# Patient Record
Sex: FEMALE | Race: ASIAN | NOT HISPANIC OR LATINO | URBAN - METROPOLITAN AREA
[De-identification: names, ages, dates, MRNs, and addresses within clinical notes are randomized per-mention and may not be internally consistent; named-entity substitution may affect disease eponyms.]

---

## 2017-02-14 ENCOUNTER — EMERGENCY (EMERGENCY)
Facility: HOSPITAL | Age: 62
LOS: 1 days | Discharge: HOME | End: 2017-02-14
Admitting: EMERGENCY MEDICINE
Payer: MEDICAID

## 2017-02-14 VITALS
DIASTOLIC BLOOD PRESSURE: 84 MMHG | SYSTOLIC BLOOD PRESSURE: 144 MMHG | HEART RATE: 75 BPM | OXYGEN SATURATION: 98 % | RESPIRATION RATE: 18 BRPM | TEMPERATURE: 98 F

## 2017-02-14 PROCEDURE — 29125 APPL SHORT ARM SPLINT STATIC: CPT

## 2017-02-14 PROCEDURE — 99283 EMERGENCY DEPT VISIT LOW MDM: CPT | Mod: 25

## 2017-02-14 NOTE — ED ADULT TRIAGE NOTE - CHIEF COMPLAINT QUOTE
Pt s/p slip and fall on snow Friday with left hand injury. Left hand appears swollen in triage. Color and temperature normal. Pt able to move all digits and left radial pulse palpable.

## 2017-02-15 PROCEDURE — 73130 X-RAY EXAM OF HAND: CPT | Mod: 26,LT

## 2017-02-15 PROCEDURE — 73110 X-RAY EXAM OF WRIST: CPT | Mod: 26,LT

## 2017-02-15 NOTE — ED PROVIDER NOTE - PROGRESS NOTE DETAILS
The scribe's documentation has been prepared under my direction and personally reviewed by me in its entirety. I confirm that the note above accurately reflects all work, treatment, procedures, and medical decision making performed by me. Mohamud Gomez PA-C

## 2017-02-15 NOTE — ED PROVIDER NOTE - MEDICAL DECISION MAKING DETAILS
61 y/o female w/ left wrist pain s/p fall- xray, ice, ortho f/u 61 y/o female w/ left wrist pain s/p fall- xray, ice, splint, ortho f/u

## 2017-02-15 NOTE — ED PROVIDER NOTE - MUSCULOSKELETAL MINIMAL EXAM
positive bruising, ttp along radial surface, liimted ROM due to apin, distal sensation intact, 2+ pulses, cap refill less than 2 sec RANGE OF MOTION LIMITED/positive bruising, ttp along radial surface, liimted ROM due to apin, distal sensation intact, 2+ pulses, cap refill less than 2 sec/TENDERNESS/motor intact

## 2017-02-15 NOTE — ED PROVIDER NOTE - NS ED MD SCRIBE ATTENDING SCRIBE SECTIONS
PAST MEDICAL/SURGICAL/SOCIAL HISTORY/DISPOSITION/REVIEW OF SYSTEMS/HISTORY OF PRESENT ILLNESS/VITAL SIGNS( Pullset)/HIV/PHYSICAL EXAM

## 2017-02-15 NOTE — ED PROVIDER NOTE - OBJECTIVE STATEMENT
61 y/o F w/ no significant PMHx, presents to the ED c/o left wrist pain s/p slip and fall on ice 5 days ago. Pt notes she fell on her left arm. Denies LOC or any other complaints. 63 y/o F w/ no significant PMHx, presents to the ED c/o left wrist pain s/p slip and fall on ice 5 days ago. Pt notes she fell on her left arm. Denies LOC, head trauma or any other complaints.

## 2017-02-16 NOTE — ED PROCEDURE NOTE - NS ED PERI VASCULAR NEG
no cyanosis of extremity/capillary refill time < 2 seconds/no paresthesia/fingers/toes warm to touch

## 2017-02-16 NOTE — ED PROCEDURE NOTE - CPROC ED POST PROC CARE GUIDE1
Instructed patient/caregiver to follow-up with primary care physician./Keep the cast/splint/dressing clean and dry./Verbal/written post procedure instructions were given to patient/caregiver./Instructed patient/caregiver regarding signs and symptoms of infection./Elevate the injured extremity as instructed.

## 2017-02-16 NOTE — ED PROCEDURE NOTE - NS ED PERI NEURO NEG
The patient/caregiver verbalized understanding of how to care for the injured extremity with splint/Post-application: Motor, sensory, and vascular responses intact in the injured extremity./Pre-application: Motor, sensory, and vascular responses intact in the injured extremity.

## 2017-09-18 ENCOUNTER — INPATIENT (INPATIENT)
Facility: HOSPITAL | Age: 62
LOS: 10 days | Discharge: HOME | End: 2017-09-29
Attending: HOSPITALIST | Admitting: HOSPITALIST
Payer: MEDICAID

## 2017-09-18 VITALS
SYSTOLIC BLOOD PRESSURE: 121 MMHG | RESPIRATION RATE: 18 BRPM | DIASTOLIC BLOOD PRESSURE: 64 MMHG | HEART RATE: 79 BPM | OXYGEN SATURATION: 99 % | TEMPERATURE: 98 F

## 2017-09-18 DIAGNOSIS — R07.9 CHEST PAIN, UNSPECIFIED: ICD-10-CM

## 2017-09-18 LAB
ALBUMIN SERPL ELPH-MCNC: 4 G/DL — SIGNIFICANT CHANGE UP (ref 3.3–5)
ALP SERPL-CCNC: 102 U/L — SIGNIFICANT CHANGE UP (ref 40–120)
ALT FLD-CCNC: 33 U/L — SIGNIFICANT CHANGE UP (ref 4–33)
ANISOCYTOSIS BLD QL: SLIGHT — SIGNIFICANT CHANGE UP
APPEARANCE UR: CLEAR — SIGNIFICANT CHANGE UP
AST SERPL-CCNC: 31 U/L — SIGNIFICANT CHANGE UP (ref 4–32)
BASE EXCESS BLDV CALC-SCNC: 4.3 MMOL/L — SIGNIFICANT CHANGE UP
BASOPHILS # BLD AUTO: 0.07 K/UL — SIGNIFICANT CHANGE UP (ref 0–0.2)
BASOPHILS NFR BLD AUTO: 0.8 % — SIGNIFICANT CHANGE UP (ref 0–2)
BASOPHILS NFR SPEC: 1.8 % — SIGNIFICANT CHANGE UP (ref 0–2)
BILIRUB SERPL-MCNC: 0.2 MG/DL — SIGNIFICANT CHANGE UP (ref 0.2–1.2)
BILIRUB UR-MCNC: NEGATIVE — SIGNIFICANT CHANGE UP
BLOOD GAS VENOUS - CREATININE: 0.5 MG/DL — SIGNIFICANT CHANGE UP (ref 0.5–1.3)
BLOOD UR QL VISUAL: NEGATIVE — SIGNIFICANT CHANGE UP
BUN SERPL-MCNC: 14 MG/DL — SIGNIFICANT CHANGE UP (ref 7–23)
CALCIUM SERPL-MCNC: 9.6 MG/DL — SIGNIFICANT CHANGE UP (ref 8.4–10.5)
CHLORIDE BLDV-SCNC: 106 MMOL/L — SIGNIFICANT CHANGE UP (ref 96–108)
CHLORIDE SERPL-SCNC: 104 MMOL/L — SIGNIFICANT CHANGE UP (ref 98–107)
CK MB BLD-MCNC: 1.6 NG/ML — SIGNIFICANT CHANGE UP (ref 1–4.7)
CK MB BLD-MCNC: SIGNIFICANT CHANGE UP (ref 0–2.5)
CK SERPL-CCNC: 38 U/L — SIGNIFICANT CHANGE UP (ref 25–170)
CO2 SERPL-SCNC: 26 MMOL/L — SIGNIFICANT CHANGE UP (ref 22–31)
COLOR SPEC: SIGNIFICANT CHANGE UP
CREAT SERPL-MCNC: 0.61 MG/DL — SIGNIFICANT CHANGE UP (ref 0.5–1.3)
EOSINOPHIL # BLD AUTO: 0.28 K/UL — SIGNIFICANT CHANGE UP (ref 0–0.5)
EOSINOPHIL NFR BLD AUTO: 3 % — SIGNIFICANT CHANGE UP (ref 0–6)
EOSINOPHIL NFR FLD: 0.9 % — SIGNIFICANT CHANGE UP (ref 0–6)
GAS PNL BLDV: 136 MMOL/L — SIGNIFICANT CHANGE UP (ref 136–146)
GIANT PLATELETS BLD QL SMEAR: PRESENT — SIGNIFICANT CHANGE UP
GLUCOSE BLDV-MCNC: 203 — HIGH (ref 70–99)
GLUCOSE SERPL-MCNC: 200 MG/DL — HIGH (ref 70–99)
GLUCOSE UR-MCNC: >1000 — SIGNIFICANT CHANGE UP
HBA1C BLD-MCNC: 10.8 % — HIGH (ref 4–5.6)
HCO3 BLDV-SCNC: 27 MMOL/L — SIGNIFICANT CHANGE UP (ref 20–27)
HCT VFR BLD CALC: 38.5 % — SIGNIFICANT CHANGE UP (ref 34.5–45)
HCT VFR BLDV CALC: 37.7 % — SIGNIFICANT CHANGE UP (ref 34.5–45)
HGB BLD-MCNC: 12.5 G/DL — SIGNIFICANT CHANGE UP (ref 11.5–15.5)
HGB BLDV-MCNC: 12.2 G/DL — SIGNIFICANT CHANGE UP (ref 11.5–15.5)
HYPOCHROMIA BLD QL: SLIGHT — SIGNIFICANT CHANGE UP
IMM GRANULOCYTES # BLD AUTO: 0.13 # — SIGNIFICANT CHANGE UP
IMM GRANULOCYTES NFR BLD AUTO: 1.4 % — SIGNIFICANT CHANGE UP (ref 0–1.5)
INR BLD: 0.96 — SIGNIFICANT CHANGE UP (ref 0.88–1.17)
KETONES UR-MCNC: NEGATIVE — SIGNIFICANT CHANGE UP
LACTATE BLDV-MCNC: 1.7 MMOL/L — SIGNIFICANT CHANGE UP (ref 0.5–2)
LEUKOCYTE ESTERASE UR-ACNC: NEGATIVE — SIGNIFICANT CHANGE UP
LYMPHOCYTES # BLD AUTO: 3.89 K/UL — HIGH (ref 1–3.3)
LYMPHOCYTES # BLD AUTO: 42.3 % — SIGNIFICANT CHANGE UP (ref 13–44)
LYMPHOCYTES NFR SPEC AUTO: 29.7 % — SIGNIFICANT CHANGE UP (ref 13–44)
MCHC RBC-ENTMCNC: 27.2 PG — SIGNIFICANT CHANGE UP (ref 27–34)
MCHC RBC-ENTMCNC: 32.5 % — SIGNIFICANT CHANGE UP (ref 32–36)
MCV RBC AUTO: 83.7 FL — SIGNIFICANT CHANGE UP (ref 80–100)
MICROCYTES BLD QL: SLIGHT — SIGNIFICANT CHANGE UP
MONOCYTES # BLD AUTO: 0.64 K/UL — SIGNIFICANT CHANGE UP (ref 0–0.9)
MONOCYTES NFR BLD AUTO: 7 % — SIGNIFICANT CHANGE UP (ref 2–14)
MONOCYTES NFR BLD: 8.1 % — SIGNIFICANT CHANGE UP (ref 2–9)
MUCOUS THREADS # UR AUTO: SIGNIFICANT CHANGE UP
NEUTROPHIL AB SER-ACNC: 53.2 % — SIGNIFICANT CHANGE UP (ref 43–77)
NEUTROPHILS # BLD AUTO: 4.18 K/UL — SIGNIFICANT CHANGE UP (ref 1.8–7.4)
NEUTROPHILS NFR BLD AUTO: 45.5 % — SIGNIFICANT CHANGE UP (ref 43–77)
NITRITE UR-MCNC: NEGATIVE — SIGNIFICANT CHANGE UP
NRBC # FLD: 0 — SIGNIFICANT CHANGE UP
PCO2 BLDV: 51 MMHG — SIGNIFICANT CHANGE UP (ref 41–51)
PH BLDV: 7.37 PH — SIGNIFICANT CHANGE UP (ref 7.32–7.43)
PH UR: 7 — SIGNIFICANT CHANGE UP (ref 4.6–8)
PLATELET # BLD AUTO: 166 K/UL — SIGNIFICANT CHANGE UP (ref 150–400)
PLATELET COUNT - ESTIMATE: NORMAL — SIGNIFICANT CHANGE UP
PMV BLD: 11.6 FL — SIGNIFICANT CHANGE UP (ref 7–13)
PO2 BLDV: 32 MMHG — LOW (ref 35–40)
POTASSIUM BLDV-SCNC: 4.6 MMOL/L — HIGH (ref 3.4–4.5)
POTASSIUM SERPL-MCNC: 4.1 MMOL/L — SIGNIFICANT CHANGE UP (ref 3.5–5.3)
POTASSIUM SERPL-SCNC: 4.1 MMOL/L — SIGNIFICANT CHANGE UP (ref 3.5–5.3)
PROT SERPL-MCNC: 7.1 G/DL — SIGNIFICANT CHANGE UP (ref 6–8.3)
PROT UR-MCNC: NEGATIVE — SIGNIFICANT CHANGE UP
PROTHROM AB SERPL-ACNC: 10.8 SEC — SIGNIFICANT CHANGE UP (ref 9.8–13.1)
RBC # BLD: 4.6 M/UL — SIGNIFICANT CHANGE UP (ref 3.8–5.2)
RBC # FLD: 15 % — HIGH (ref 10.3–14.5)
RBC CASTS # UR COMP ASSIST: SIGNIFICANT CHANGE UP (ref 0–?)
REVIEW TO FOLLOW: YES — SIGNIFICANT CHANGE UP
SAO2 % BLDV: 56.3 % — LOW (ref 60–85)
SODIUM SERPL-SCNC: 142 MMOL/L — SIGNIFICANT CHANGE UP (ref 135–145)
SP GR SPEC: 1.01 — SIGNIFICANT CHANGE UP (ref 1–1.03)
TROPONIN T SERPL-MCNC: < 0.06 NG/ML — SIGNIFICANT CHANGE UP (ref 0–0.06)
UROBILINOGEN FLD QL: NORMAL E.U. — SIGNIFICANT CHANGE UP (ref 0.1–0.2)
VARIANT LYMPHS # BLD: 6.3 % — SIGNIFICANT CHANGE UP
WBC # BLD: 9.19 K/UL — SIGNIFICANT CHANGE UP (ref 3.8–10.5)
WBC # FLD AUTO: 9.19 K/UL — SIGNIFICANT CHANGE UP (ref 3.8–10.5)
WBC UR QL: SIGNIFICANT CHANGE UP (ref 0–?)

## 2017-09-18 PROCEDURE — 71020: CPT | Mod: 26

## 2017-09-18 RX ORDER — FAMOTIDINE 10 MG/ML
20 INJECTION INTRAVENOUS ONCE
Qty: 0 | Refills: 0 | Status: COMPLETED | OUTPATIENT
Start: 2017-09-18 | End: 2017-09-18

## 2017-09-18 RX ORDER — INSULIN LISPRO 100/ML
VIAL (ML) SUBCUTANEOUS
Qty: 0 | Refills: 0 | Status: DISCONTINUED | OUTPATIENT
Start: 2017-09-18 | End: 2017-09-19

## 2017-09-18 RX ORDER — DEXTROSE 50 % IN WATER 50 %
12.5 SYRINGE (ML) INTRAVENOUS ONCE
Qty: 0 | Refills: 0 | Status: DISCONTINUED | OUTPATIENT
Start: 2017-09-18 | End: 2017-09-29

## 2017-09-18 RX ORDER — HEPARIN SODIUM 5000 [USP'U]/ML
5000 INJECTION INTRAVENOUS; SUBCUTANEOUS EVERY 12 HOURS
Qty: 0 | Refills: 0 | Status: DISCONTINUED | OUTPATIENT
Start: 2017-09-18 | End: 2017-09-29

## 2017-09-18 RX ORDER — GLUCAGON INJECTION, SOLUTION 0.5 MG/.1ML
1 INJECTION, SOLUTION SUBCUTANEOUS ONCE
Qty: 0 | Refills: 0 | Status: DISCONTINUED | OUTPATIENT
Start: 2017-09-18 | End: 2017-09-29

## 2017-09-18 RX ORDER — ASPIRIN/CALCIUM CARB/MAGNESIUM 324 MG
162 TABLET ORAL DAILY
Qty: 0 | Refills: 0 | Status: DISCONTINUED | OUTPATIENT
Start: 2017-09-18 | End: 2017-09-29

## 2017-09-18 RX ORDER — SODIUM CHLORIDE 9 MG/ML
1000 INJECTION INTRAMUSCULAR; INTRAVENOUS; SUBCUTANEOUS ONCE
Qty: 0 | Refills: 0 | Status: COMPLETED | OUTPATIENT
Start: 2017-09-18 | End: 2017-09-18

## 2017-09-18 RX ORDER — SODIUM CHLORIDE 9 MG/ML
1000 INJECTION, SOLUTION INTRAVENOUS
Qty: 0 | Refills: 0 | Status: DISCONTINUED | OUTPATIENT
Start: 2017-09-18 | End: 2017-09-29

## 2017-09-18 RX ORDER — INSULIN LISPRO 100/ML
VIAL (ML) SUBCUTANEOUS AT BEDTIME
Qty: 0 | Refills: 0 | Status: DISCONTINUED | OUTPATIENT
Start: 2017-09-18 | End: 2017-09-29

## 2017-09-18 RX ORDER — ALBUTEROL 90 UG/1
2 AEROSOL, METERED ORAL EVERY 6 HOURS
Qty: 0 | Refills: 0 | Status: DISCONTINUED | OUTPATIENT
Start: 2017-09-18 | End: 2017-09-25

## 2017-09-18 RX ORDER — BUDESONIDE AND FORMOTEROL FUMARATE DIHYDRATE 160; 4.5 UG/1; UG/1
2 AEROSOL RESPIRATORY (INHALATION)
Qty: 0 | Refills: 0 | Status: DISCONTINUED | OUTPATIENT
Start: 2017-09-18 | End: 2017-09-19

## 2017-09-18 RX ORDER — IPRATROPIUM/ALBUTEROL SULFATE 18-103MCG
3 AEROSOL WITH ADAPTER (GRAM) INHALATION ONCE
Qty: 0 | Refills: 0 | Status: COMPLETED | OUTPATIENT
Start: 2017-09-18 | End: 2017-09-18

## 2017-09-18 RX ORDER — DEXTROSE 50 % IN WATER 50 %
25 SYRINGE (ML) INTRAVENOUS ONCE
Qty: 0 | Refills: 0 | Status: DISCONTINUED | OUTPATIENT
Start: 2017-09-18 | End: 2017-09-29

## 2017-09-18 RX ORDER — DEXTROSE 50 % IN WATER 50 %
1 SYRINGE (ML) INTRAVENOUS ONCE
Qty: 0 | Refills: 0 | Status: DISCONTINUED | OUTPATIENT
Start: 2017-09-18 | End: 2017-09-29

## 2017-09-18 RX ADMIN — Medication 40 MILLIGRAM(S): at 14:52

## 2017-09-18 RX ADMIN — Medication 30 MILLILITER(S): at 12:30

## 2017-09-18 RX ADMIN — Medication 162 MILLIGRAM(S): at 12:30

## 2017-09-18 RX ADMIN — Medication: at 23:34

## 2017-09-18 RX ADMIN — SODIUM CHLORIDE 1000 MILLILITER(S): 9 INJECTION INTRAMUSCULAR; INTRAVENOUS; SUBCUTANEOUS at 14:52

## 2017-09-18 RX ADMIN — FAMOTIDINE 20 MILLIGRAM(S): 10 INJECTION INTRAVENOUS at 12:30

## 2017-09-18 RX ADMIN — Medication 3 MILLILITER(S): at 13:14

## 2017-09-18 NOTE — ED ADULT TRIAGE NOTE - CHIEF COMPLAINT QUOTE
Pt co weakness, sob, chest pain, dizziness, back pain x 3 weeks. Daughter states FS was 38 this am, was given an apple and FS went up to 400.  in triage. Awake and alert in triage. Pt co weakness, sob, chest pain, dry cough, dizziness and back pain x 3 weeks. Daughter states FS was 38 this am, was given an apple and FS went up to 400.  in triage. Awake and alert in triage.

## 2017-09-18 NOTE — ED PROVIDER NOTE - OBJECTIVE STATEMENT
62F hx of DM on Metformin and bronchitis presents with chest pain, cough with yellow sputum, weight loss (8lb over a month) and dysuria x 1 month. CP is described as sharp and pressure like chest pain, intermittent, non exertional and not associated with nausea or vomiting or diaphoresis. Also endorsed to chronic bronchitis since bad PNA in 90s and having cough with yellow productive sputum. Also endorsed to burning sensation with urination and weight loss without trying. Pt denies fever or chills. No back pain or abd pain. No SOB.

## 2017-09-18 NOTE — ED PROVIDER NOTE - ATTENDING CONTRIBUTION TO CARE
AJM: Pt seen with resident an agree with above note. 62F hx of DM on Metformin and bronchitis presents with chest pain, cough with yellow sputum, weight loss (8lb over a month) and dysuria x 1 month. CP is described as sharp and pressure like chest pain, intermittent, non exertional and not associated with nausea or vomiting or diaphoresis. Also endorsed to chronic bronchitis. Chronic cough. Not on meds since moving from [place] 10 months ago. No LE pain ro swelling. symptoms not worse with exertion. Will r/o acs. symptoms likely 2/2 chronic pulm dz. steroids and nebs. cxr.

## 2017-09-19 DIAGNOSIS — R07.9 CHEST PAIN, UNSPECIFIED: ICD-10-CM

## 2017-09-19 DIAGNOSIS — J40 BRONCHITIS, NOT SPECIFIED AS ACUTE OR CHRONIC: ICD-10-CM

## 2017-09-19 DIAGNOSIS — E11.9 TYPE 2 DIABETES MELLITUS WITHOUT COMPLICATIONS: ICD-10-CM

## 2017-09-19 DIAGNOSIS — E11.65 TYPE 2 DIABETES MELLITUS WITH HYPERGLYCEMIA: ICD-10-CM

## 2017-09-19 DIAGNOSIS — Z29.9 ENCOUNTER FOR PROPHYLACTIC MEASURES, UNSPECIFIED: ICD-10-CM

## 2017-09-19 DIAGNOSIS — Z90.49 ACQUIRED ABSENCE OF OTHER SPECIFIED PARTS OF DIGESTIVE TRACT: Chronic | ICD-10-CM

## 2017-09-19 LAB
B PERT DNA SPEC QL NAA+PROBE: SIGNIFICANT CHANGE UP
BUN SERPL-MCNC: 24 MG/DL — HIGH (ref 7–23)
C PNEUM DNA SPEC QL NAA+PROBE: NOT DETECTED — SIGNIFICANT CHANGE UP
CALCIUM SERPL-MCNC: 9 MG/DL — SIGNIFICANT CHANGE UP (ref 8.4–10.5)
CHLORIDE SERPL-SCNC: 104 MMOL/L — SIGNIFICANT CHANGE UP (ref 98–107)
CHOLEST SERPL-MCNC: 130 MG/DL — SIGNIFICANT CHANGE UP (ref 120–199)
CK MB BLD-MCNC: 1.35 NG/ML — SIGNIFICANT CHANGE UP (ref 1–4.7)
CK SERPL-CCNC: 30 U/L — SIGNIFICANT CHANGE UP (ref 25–170)
CO2 SERPL-SCNC: 24 MMOL/L — SIGNIFICANT CHANGE UP (ref 22–31)
CREAT SERPL-MCNC: 0.72 MG/DL — SIGNIFICANT CHANGE UP (ref 0.5–1.3)
FLUAV H1 2009 PAND RNA SPEC QL NAA+PROBE: NOT DETECTED — SIGNIFICANT CHANGE UP
FLUAV H1 RNA SPEC QL NAA+PROBE: NOT DETECTED — SIGNIFICANT CHANGE UP
FLUAV H3 RNA SPEC QL NAA+PROBE: NOT DETECTED — SIGNIFICANT CHANGE UP
FLUAV SUBTYP SPEC NAA+PROBE: SIGNIFICANT CHANGE UP
FLUBV RNA SPEC QL NAA+PROBE: NOT DETECTED — SIGNIFICANT CHANGE UP
GLUCOSE SERPL-MCNC: 124 MG/DL — HIGH (ref 70–99)
HADV DNA SPEC QL NAA+PROBE: NOT DETECTED — SIGNIFICANT CHANGE UP
HCOV 229E RNA SPEC QL NAA+PROBE: NOT DETECTED — SIGNIFICANT CHANGE UP
HCOV HKU1 RNA SPEC QL NAA+PROBE: NOT DETECTED — SIGNIFICANT CHANGE UP
HCOV NL63 RNA SPEC QL NAA+PROBE: NOT DETECTED — SIGNIFICANT CHANGE UP
HCOV OC43 RNA SPEC QL NAA+PROBE: NOT DETECTED — SIGNIFICANT CHANGE UP
HCT VFR BLD CALC: 36.3 % — SIGNIFICANT CHANGE UP (ref 34.5–45)
HDLC SERPL-MCNC: 74 MG/DL — HIGH (ref 45–65)
HGB BLD-MCNC: 11.7 G/DL — SIGNIFICANT CHANGE UP (ref 11.5–15.5)
HMPV RNA SPEC QL NAA+PROBE: NOT DETECTED — SIGNIFICANT CHANGE UP
HPIV1 RNA SPEC QL NAA+PROBE: NOT DETECTED — SIGNIFICANT CHANGE UP
HPIV2 RNA SPEC QL NAA+PROBE: NOT DETECTED — SIGNIFICANT CHANGE UP
HPIV3 RNA SPEC QL NAA+PROBE: NOT DETECTED — SIGNIFICANT CHANGE UP
HPIV4 RNA SPEC QL NAA+PROBE: NOT DETECTED — SIGNIFICANT CHANGE UP
IGE SERPL-ACNC: 8.63 IU/ML — SIGNIFICANT CHANGE UP (ref 0–100)
LIPID PNL WITH DIRECT LDL SERPL: 51 MG/DL — SIGNIFICANT CHANGE UP
M PNEUMO DNA SPEC QL NAA+PROBE: NOT DETECTED — SIGNIFICANT CHANGE UP
MAGNESIUM SERPL-MCNC: 1.9 MG/DL — SIGNIFICANT CHANGE UP (ref 1.6–2.6)
MCHC RBC-ENTMCNC: 26.3 PG — LOW (ref 27–34)
MCHC RBC-ENTMCNC: 32.2 % — SIGNIFICANT CHANGE UP (ref 32–36)
MCV RBC AUTO: 81.6 FL — SIGNIFICANT CHANGE UP (ref 80–100)
NRBC # FLD: 0 — SIGNIFICANT CHANGE UP
PHOSPHATE SERPL-MCNC: 2.5 MG/DL — SIGNIFICANT CHANGE UP (ref 2.5–4.5)
PLATELET # BLD AUTO: 157 K/UL — SIGNIFICANT CHANGE UP (ref 150–400)
PMV BLD: 11.5 FL — SIGNIFICANT CHANGE UP (ref 7–13)
POTASSIUM SERPL-MCNC: 3.8 MMOL/L — SIGNIFICANT CHANGE UP (ref 3.5–5.3)
POTASSIUM SERPL-SCNC: 3.8 MMOL/L — SIGNIFICANT CHANGE UP (ref 3.5–5.3)
RBC # BLD: 4.45 M/UL — SIGNIFICANT CHANGE UP (ref 3.8–5.2)
RBC # FLD: 15 % — HIGH (ref 10.3–14.5)
RHEUMATOID FACT SERPL-ACNC: 12.1 IU/ML — SIGNIFICANT CHANGE UP
RSV RNA SPEC QL NAA+PROBE: POSITIVE — HIGH
RV+EV RNA SPEC QL NAA+PROBE: NOT DETECTED — SIGNIFICANT CHANGE UP
SODIUM SERPL-SCNC: 139 MMOL/L — SIGNIFICANT CHANGE UP (ref 135–145)
TRIGL SERPL-MCNC: 59 MG/DL — SIGNIFICANT CHANGE UP (ref 10–149)
TROPONIN T SERPL-MCNC: < 0.06 NG/ML — SIGNIFICANT CHANGE UP (ref 0–0.06)
TSH SERPL-MCNC: 1.93 UIU/ML — SIGNIFICANT CHANGE UP (ref 0.27–4.2)
WBC # BLD: 10.91 K/UL — HIGH (ref 3.8–10.5)
WBC # FLD AUTO: 10.91 K/UL — HIGH (ref 3.8–10.5)

## 2017-09-19 PROCEDURE — 99223 1ST HOSP IP/OBS HIGH 75: CPT | Mod: GC

## 2017-09-19 PROCEDURE — 93018 CV STRESS TEST I&R ONLY: CPT | Mod: GC

## 2017-09-19 PROCEDURE — 99255 IP/OBS CONSLTJ NEW/EST HI 80: CPT | Mod: GC

## 2017-09-19 PROCEDURE — 93016 CV STRESS TEST SUPVJ ONLY: CPT | Mod: GC

## 2017-09-19 PROCEDURE — 71250 CT THORAX DX C-: CPT | Mod: 26

## 2017-09-19 PROCEDURE — 78452 HT MUSCLE IMAGE SPECT MULT: CPT | Mod: 26

## 2017-09-19 RX ORDER — MONTELUKAST 4 MG/1
10 TABLET, CHEWABLE ORAL AT BEDTIME
Qty: 0 | Refills: 0 | Status: DISCONTINUED | OUTPATIENT
Start: 2017-09-19 | End: 2017-09-29

## 2017-09-19 RX ORDER — AZITHROMYCIN 500 MG/1
250 TABLET, FILM COATED ORAL DAILY
Qty: 0 | Refills: 0 | Status: COMPLETED | OUTPATIENT
Start: 2017-09-20 | End: 2017-09-23

## 2017-09-19 RX ORDER — CEFTRIAXONE 500 MG/1
1 INJECTION, POWDER, FOR SOLUTION INTRAMUSCULAR; INTRAVENOUS ONCE
Qty: 0 | Refills: 0 | Status: COMPLETED | OUTPATIENT
Start: 2017-09-19 | End: 2017-09-19

## 2017-09-19 RX ORDER — IPRATROPIUM/ALBUTEROL SULFATE 18-103MCG
3 AEROSOL WITH ADAPTER (GRAM) INHALATION EVERY 6 HOURS
Qty: 0 | Refills: 0 | Status: DISCONTINUED | OUTPATIENT
Start: 2017-09-19 | End: 2017-09-22

## 2017-09-19 RX ORDER — INSULIN LISPRO 100/ML
3 VIAL (ML) SUBCUTANEOUS
Qty: 0 | Refills: 0 | Status: DISCONTINUED | OUTPATIENT
Start: 2017-09-19 | End: 2017-09-20

## 2017-09-19 RX ORDER — DEXTROSE 50 % IN WATER 50 %
25 SYRINGE (ML) INTRAVENOUS ONCE
Qty: 0 | Refills: 0 | Status: DISCONTINUED | OUTPATIENT
Start: 2017-09-19 | End: 2017-09-29

## 2017-09-19 RX ORDER — AZITHROMYCIN 500 MG/1
500 TABLET, FILM COATED ORAL ONCE
Qty: 0 | Refills: 0 | Status: COMPLETED | OUTPATIENT
Start: 2017-09-19 | End: 2017-09-19

## 2017-09-19 RX ORDER — DEXTROSE 50 % IN WATER 50 %
1 SYRINGE (ML) INTRAVENOUS ONCE
Qty: 0 | Refills: 0 | Status: DISCONTINUED | OUTPATIENT
Start: 2017-09-19 | End: 2017-09-29

## 2017-09-19 RX ORDER — BUDESONIDE AND FORMOTEROL FUMARATE DIHYDRATE 160; 4.5 UG/1; UG/1
2 AEROSOL RESPIRATORY (INHALATION)
Qty: 0 | Refills: 0 | Status: DISCONTINUED | OUTPATIENT
Start: 2017-09-19 | End: 2017-09-29

## 2017-09-19 RX ORDER — CEFTRIAXONE 500 MG/1
1 INJECTION, POWDER, FOR SOLUTION INTRAMUSCULAR; INTRAVENOUS EVERY 24 HOURS
Qty: 0 | Refills: 0 | Status: DISCONTINUED | OUTPATIENT
Start: 2017-09-20 | End: 2017-09-24

## 2017-09-19 RX ORDER — INSULIN LISPRO 100/ML
VIAL (ML) SUBCUTANEOUS
Qty: 0 | Refills: 0 | Status: DISCONTINUED | OUTPATIENT
Start: 2017-09-19 | End: 2017-09-21

## 2017-09-19 RX ORDER — SODIUM CHLORIDE 9 MG/ML
1000 INJECTION, SOLUTION INTRAVENOUS
Qty: 0 | Refills: 0 | Status: DISCONTINUED | OUTPATIENT
Start: 2017-09-19 | End: 2017-09-29

## 2017-09-19 RX ORDER — CEFTRIAXONE 500 MG/1
INJECTION, POWDER, FOR SOLUTION INTRAMUSCULAR; INTRAVENOUS
Qty: 0 | Refills: 0 | Status: DISCONTINUED | OUTPATIENT
Start: 2017-09-19 | End: 2017-09-24

## 2017-09-19 RX ORDER — INSULIN GLARGINE 100 [IU]/ML
9 INJECTION, SOLUTION SUBCUTANEOUS AT BEDTIME
Qty: 0 | Refills: 0 | Status: DISCONTINUED | OUTPATIENT
Start: 2017-09-19 | End: 2017-09-21

## 2017-09-19 RX ORDER — GLUCAGON INJECTION, SOLUTION 0.5 MG/.1ML
1 INJECTION, SOLUTION SUBCUTANEOUS ONCE
Qty: 0 | Refills: 0 | Status: DISCONTINUED | OUTPATIENT
Start: 2017-09-19 | End: 2017-09-29

## 2017-09-19 RX ADMIN — Medication 3: at 17:24

## 2017-09-19 RX ADMIN — MONTELUKAST 10 MILLIGRAM(S): 4 TABLET, CHEWABLE ORAL at 22:04

## 2017-09-19 RX ADMIN — Medication 162 MILLIGRAM(S): at 11:48

## 2017-09-19 RX ADMIN — Medication 60 MILLIGRAM(S): at 13:33

## 2017-09-19 RX ADMIN — HEPARIN SODIUM 5000 UNIT(S): 5000 INJECTION INTRAVENOUS; SUBCUTANEOUS at 06:15

## 2017-09-19 RX ADMIN — CEFTRIAXONE 100 GRAM(S): 500 INJECTION, POWDER, FOR SOLUTION INTRAMUSCULAR; INTRAVENOUS at 17:25

## 2017-09-19 RX ADMIN — Medication 40 MILLIGRAM(S): at 22:05

## 2017-09-19 RX ADMIN — AZITHROMYCIN 500 MILLIGRAM(S): 500 TABLET, FILM COATED ORAL at 18:42

## 2017-09-19 RX ADMIN — BUDESONIDE AND FORMOTEROL FUMARATE DIHYDRATE 2 PUFF(S): 160; 4.5 AEROSOL RESPIRATORY (INHALATION) at 22:05

## 2017-09-19 RX ADMIN — Medication 6: at 22:04

## 2017-09-19 RX ADMIN — Medication 3 UNIT(S): at 17:25

## 2017-09-19 RX ADMIN — BUDESONIDE AND FORMOTEROL FUMARATE DIHYDRATE 2 PUFF(S): 160; 4.5 AEROSOL RESPIRATORY (INHALATION) at 09:38

## 2017-09-19 RX ADMIN — INSULIN GLARGINE 9 UNIT(S): 100 INJECTION, SOLUTION SUBCUTANEOUS at 22:05

## 2017-09-19 RX ADMIN — HEPARIN SODIUM 5000 UNIT(S): 5000 INJECTION INTRAVENOUS; SUBCUTANEOUS at 17:26

## 2017-09-19 RX ADMIN — Medication 4: at 12:40

## 2017-09-19 NOTE — H&P ADULT - ASSESSMENT
61 y/o F with h/o DM, bronchitis presents to the ED for sugar management. Admit to telemetry to r/o ACS.

## 2017-09-19 NOTE — H&P ADULT - HISTORY OF PRESENT ILLNESS
61 y/o F with h/o DM, bronchitis presents to the ED for sugar management. As per daughter, patient's sugars has been fluctuating from 60s to 400s upon checking fingersticks. As per daughter, patient hasn't been able to follow up with a doctor because doctor appointment is a month away.  Pt also states she has chest pressure whenever she coughs.  Pt denies any chest pain. Pt has been having a cough with yellow productive sputum. Pt denies Loc, syncope fever, chills, numbness, tingling, palpitations, N/V/D/C, dysuria, urinary/bowel incontinence or any other complaints at this time.

## 2017-09-19 NOTE — CONSULT NOTE ADULT - ATTENDING COMMENTS
This is a 62-year-old [place] female with a history of Asthma, Bronchiectasis, allergic bronchopulmonary aspergillosis (s/p Itraconazole 2015), on chronic steroids, and diabetes who presents with worsening cough, chest tightness, and pleuritic chest pain with wheezing consistent with asthma/bronchiectasis exacerbation.    - Given her diabetes and relatively stable respiratory status currently, would decrease solumedrol to 40 mg IV q12h  - Start Symbicort 160/4.5 2 puffs bid, give albuterol nebs q6h with prn albuterol q4h, monitor for tachycardia, start singulair 10 mg qhs  - Check sputum culture, RVP, Urine legionella, HIV, Quantiferon gold, immunoglobulin panel (including IgG, IgE levels), RF/CCP, aspergillus ab and aspergillus precipitins  - CT Chest non-contrast  - Would start Ceftriaxone/Azithromycin for now until cultures return, no history of pseudomonas or MRSA, non-toxic appearing at present, hold off on broad-spectrum abx at this time

## 2017-09-19 NOTE — H&P ADULT - NSHPREVIEWOFSYSTEMS_GEN_ALL_CORE
Constitutional: No fever, fatigue or weight loss.  Skin: No rash.  Eyes: No recent vision problems or eye pain.  ENT: No congestion, ear pain, or sore throat.  Endocrine: No thyroid problems.  Cardiovascular: No chest pain or palpitation.  Respiratory: + productive cough. No shortness of breath, congestion, or wheezing.  Gastrointestinal: No abdominal pain, nausea, vomiting, or diarrhea.  Genitourinary: No dysuria.  Musculoskeletal: No joint swelling.  Neurologic: No headache.

## 2017-09-19 NOTE — H&P ADULT - ATTENDING COMMENTS
Assessment  1.	Chest discomfort  2.	Dyspnea  3.	Bronchiectasis  4.	Uncontrolled diabetes mellitus      Plan  1.	Stress myocardial perfusion imaging  2.	Endocrine consult  3.	Pulmonary consult  4.	IV steroids and bronchodilators

## 2017-09-19 NOTE — H&P ADULT - NSHPLABSRESULTS_GEN_ALL_CORE
LABS:                        12.5   9.19  )-----------( 166      ( 18 Sep 2017 11:50 )             38.5         142  |  104  |  14  ----------------------------<  200<H>  4.1   |  26  |  0.61    Ca    9.6      18 Sep 2017 11:50    TPro  7.1  /  Alb  4.0  /  TBili  0.2  /  DBili  x   /  AST  31  /  ALT  33  /  AlkPhos  102  -    PT/INR - ( 18 Sep 2017 11:50 )   PT: 10.8 SEC;   INR: 0.96            Urinalysis Basic - ( 18 Sep 2017 19:25 )    Color: x / Appearance: CLEAR / S.009 / pH: 7.0  Gluc: >1000 / Ketone: NEGATIVE  / Bili: NEGATIVE / Urobili: NORMAL E.U.   Blood: NEGATIVE / Protein: NEGATIVE / Nitrite: NEGATIVE   Leuk Esterase: NEGATIVE / RBC: 0-2 / WBC 2-5   Sq Epi: x / Non Sq Epi: x / Bacteria: x      CAPILLARY BLOOD GLUCOSE  200 (19 Sep 2017 01:49)  330 (18 Sep 2017 22:26)  238 (18 Sep 2017 11:20)    Urinalysis Basic - ( 18 Sep 2017 19:25 )    Color: x / Appearance: CLEAR / S.009 / pH: 7.0  Gluc: >1000 / Ketone: NEGATIVE  / Bili: NEGATIVE / Urobili: NORMAL E.U.   Blood: NEGATIVE / Protein: NEGATIVE / Nitrite: NEGATIVE   Leuk Esterase: NEGATIVE / RBC: 0-2 / WBC 2-5   Sq Epi: x / Non Sq Epi: x / Bacteria: x

## 2017-09-19 NOTE — CONSULT NOTE ADULT - SUBJECTIVE AND OBJECTIVE BOX
CHIEF COMPLAINT:    HPI:63 y/o F with h/o DM, bronchitis presents to the ED for diabetes managment.     PAST MEDICAL & SURGICAL HISTORY:  Bronchitis  DM (diabetes mellitus)  History of cholecystectomy      FAMILY HISTORY:  No pertinent family history in first degree relatives      SOCIAL HISTORY:  Smoking: [ ] Never Smoked [ ] Former Smoker (__ packs x ___ years) [ ] Current Smoker  (__ packs x ___ years)  Substance Use: [ ] Never Used [ ] Used ____  EtOH Use:  Marital Status: [ ] Single [ ]  [ ]  [ ]   Sexual History:   Occupation:  Recent Travel:  Country of Birth:  Advance Directives:    Allergies    No Known Allergies    Intolerances        HOME MEDICATIONS:    REVIEW OF SYSTEMS:  Constitutional: [ ] negative [ ] fevers [ ] chills [ ] weight loss [ ] weight gain  HEENT: [ ] negative [ ] dry eyes [ ] eye irritation [ ] postnasal drip [ ] nasal congestion  CV: [ ] negative  [ ] chest pain [ ] orthopnea [ ] palpitations [ ] murmur  Resp: [ ] negative [ ] cough [ ] shortness of breath [ ] dyspnea [ ] wheezing [ ] sputum [ ] hemoptysis  GI: [ ] negative [ ] nausea [ ] vomiting [ ] diarrhea [ ] constipation [ ] abd pain [ ] dysphagia   : [ ] negative [ ] dysuria [ ] nocturia [ ] hematuria [ ] increased urinary frequency  Musculoskeletal: [ ] negative [ ] back pain [ ] myalgias [ ] arthralgias [ ] fracture  Skin: [ ] negative [ ] rash [ ] itch  Neurological: [ ] negative [ ] headache [ ] dizziness [ ] syncope [ ] weakness [ ] numbness  Psychiatric: [ ] negative [ ] anxiety [ ] depression  Endocrine: [ ] negative [ ] diabetes [ ] thyroid problem  Hematologic/Lymphatic: [ ] negative [ ] anemia [ ] bleeding problem  Allergic/Immunologic: [ ] negative [ ] itchy eyes [ ] nasal discharge [ ] hives [ ] angioedema  [ ] All other systems negative  [ ] Unable to assess ROS because ________    OBJECTIVE:  ICU Vital Signs Last 24 Hrs  T(C): 36.7 (19 Sep 2017 01:50), Max: 36.8 (18 Sep 2017 11:20)  T(F): 98 (19 Sep 2017 01:50), Max: 98.3 (18 Sep 2017 11:20)  HR: 66 (19 Sep 2017 01:50) (64 - 84)  BP: 130/75 (19 Sep 2017 01:50) (121/64 - 157/91)  BP(mean): --  ABP: --  ABP(mean): --  RR: 16 (19 Sep 2017 01:50) (16 - 24)  SpO2: 98% (19 Sep 2017 01:50) (97% - 99%)        CAPILLARY BLOOD GLUCOSE  91 (19 Sep 2017 08:51)          PHYSICAL EXAM:  General:   HEENT:   Lymph Nodes:  Neck:   Respiratory:   Cardiovascular:   Abdomen:   Extremities:   Skin:   Neurological:  Psychiatry:    HOSPITAL MEDICATIONS:  Standing Meds:  aspirin  chewable 162 milliGRAM(s) Oral daily  heparin  Injectable 5000 Unit(s) SubCutaneous every 12 hours  insulin lispro (HumaLOG) corrective regimen sliding scale   SubCutaneous three times a day before meals  insulin lispro (HumaLOG) corrective regimen sliding scale   SubCutaneous at bedtime  dextrose 5%. 1000 milliLiter(s) IV Continuous <Continuous>  dextrose 50% Injectable 12.5 Gram(s) IV Push once  dextrose 50% Injectable 25 Gram(s) IV Push once  dextrose 50% Injectable 25 Gram(s) IV Push once  buDESOnide  80 MICROgram(s)/formoterol 4.5 MICROgram(s) Inhaler 2 Puff(s) Inhalation two times a day  insulin lispro (HumaLOG) corrective regimen sliding scale   SubCutaneous three times a day before meals  dextrose 5%. 1000 milliLiter(s) IV Continuous <Continuous>  dextrose 50% Injectable 25 Gram(s) IV Push once      PRN Meds:  dextrose Gel 1 Dose(s) Oral once PRN  glucagon  Injectable 1 milliGRAM(s) IntraMuscular once PRN  ALBUTerol    90 MICROgram(s) HFA Inhaler 2 Puff(s) Inhalation every 6 hours PRN  dextrose Gel 1 Dose(s) Oral once PRN  glucagon  Injectable 1 milliGRAM(s) IntraMuscular once PRN      LABS:                        11.7   10.91 )-----------( 157      ( 19 Sep 2017 06:00 )             36.3     Hgb Trend: 11.7<--, 12.5<--      139  |  104  |  24<H>  ----------------------------<  124<H>  3.8   |  24  |  0.72    Ca    9.0      19 Sep 2017 06:00  Phos  2.5       Mg     1.9         TPro  7.1  /  Alb  4.0  /  TBili  0.2  /  DBili  x   /  AST  31  /  ALT  33  /  AlkPhos  102      Creatinine Trend: 0.72<--, 0.61<--  PT/INR - ( 18 Sep 2017 11:50 )   PT: 10.8 SEC;   INR: 0.96            Urinalysis Basic - ( 18 Sep 2017 19:25 )    Color: x / Appearance: CLEAR / S.009 / pH: 7.0  Gluc: >1000 / Ketone: NEGATIVE  / Bili: NEGATIVE / Urobili: NORMAL E.U.   Blood: NEGATIVE / Protein: NEGATIVE / Nitrite: NEGATIVE   Leuk Esterase: NEGATIVE / RBC: 0-2 / WBC 2-5   Sq Epi: x / Non Sq Epi: x / Bacteria: x        Venous Blood Gas:   @ 11:50  7.37/51/32/27/56.3  VBG Lactate: 1.7      MICROBIOLOGY:     RADIOLOGY:  [ ] Reviewed and interpreted by me    PULMONARY FUNCTION TESTS:    EKG: CHIEF COMPLAINT:    HPI:61 y/o F with h/o DM, bronchitis presents to the ED for worsening SOB, cough and sputum production. Pt has a sharyn history of bronchiectais diagnosed with ABPA in [place].  In .    She has had many exacerbationand many courses of prednisone which are self administered.  She ran out of her meds from [place] about 1 month ago and has used her daughters prednisone, albuterol, and Symbicort withno relief. She did run out of her augmentin from [place]. She has had negative quantaferon gold before, negative HIV, and positive aspergilous titers.  She has not had fevers or chills.  She has had increase in sputum production and change in sputum consistency      PAST MEDICAL & SURGICAL HISTORY:  Bronchitis  DM (diabetes mellitus)  History of cholecystectomy      FAMILY HISTORY:  No pertinent family history in first degree relatives      SOCIAL HISTORY:  Smoking: [ x] Never Smoked [ ] Former Smoker (__ packs x ___ years) [ ] Current Smoker  (__ packs x ___ years)  Substance Use: [ x] Never Used [ ] Used ____  EtOH Use:no  Marital Status: [ ] Single [ ]  [ ]  [ ]   Sexual History:   Occupation:  Recent Travel:  Country of Birth:  Advance Directives:    Allergies    No Known Allergies    Intolerances        HOME MEDICATIONS: as in Hpi    REVIEW OF SYSTEMS:  [x] All other systems negative  [ ] Unable to assess ROS because ________    OBJECTIVE:  ICU Vital Signs Last 24 Hrs  T(C): 36.7 (19 Sep 2017 01:50), Max: 36.8 (18 Sep 2017 11:20)  T(F): 98 (19 Sep 2017 01:50), Max: 98.3 (18 Sep 2017 11:20)  HR: 66 (19 Sep 2017 01:50) (64 - 84)  BP: 130/75 (19 Sep 2017 01:50) (121/64 - 157/91)  BP(mean): --  ABP: --  ABP(mean): --  RR: 16 (19 Sep 2017 01:50) (16 - 24)  SpO2: 98% (19 Sep 2017 01:50) (97% - 99%)        CAPILLARY BLOOD GLUCOSE  91 (19 Sep 2017 08:51)          PHYSICAL EXAM:  GENERAL: NAD  EYES: No proptosis, no lid lag, anicteric  HEENT:  Atraumatic, Normocephalic, moist mucous membranes  RESPIRATORY: Poor air entry and b/l upper lobe epiratory ronchi. Clear to auscultation bilaterally; No rales, rhonchi, wheezing, or rubs  CARDIOVASCULAR: Regular rate and rhythm; No murmurs; no peripheral edema  GI: Soft, nontender, non distended, normal bowel sounds  SKIN: Dry, intact, No rashes or lesions  NEURO: sensation intact  PSYCH: Alert and oriented x 3, normal affect, normal mood    HOSPITAL MEDICATIONS:  Standing Meds:  aspirin  chewable 162 milliGRAM(s) Oral daily  heparin  Injectable 5000 Unit(s) SubCutaneous every 12 hours  insulin lispro (HumaLOG) corrective regimen sliding scale   SubCutaneous three times a day before meals  insulin lispro (HumaLOG) corrective regimen sliding scale   SubCutaneous at bedtime  dextrose 5%. 1000 milliLiter(s) IV Continuous <Continuous>  dextrose 50% Injectable 12.5 Gram(s) IV Push once  dextrose 50% Injectable 25 Gram(s) IV Push once  dextrose 50% Injectable 25 Gram(s) IV Push once  buDESOnide  80 MICROgram(s)/formoterol 4.5 MICROgram(s) Inhaler 2 Puff(s) Inhalation two times a day  insulin lispro (HumaLOG) corrective regimen sliding scale   SubCutaneous three times a day before meals  dextrose 5%. 1000 milliLiter(s) IV Continuous <Continuous>  dextrose 50% Injectable 25 Gram(s) IV Push once      PRN Meds:  dextrose Gel 1 Dose(s) Oral once PRN  glucagon  Injectable 1 milliGRAM(s) IntraMuscular once PRN  ALBUTerol    90 MICROgram(s) HFA Inhaler 2 Puff(s) Inhalation every 6 hours PRN  dextrose Gel 1 Dose(s) Oral once PRN  glucagon  Injectable 1 milliGRAM(s) IntraMuscular once PRN      LABS:                        11.7   10.91 )-----------( 157      ( 19 Sep 2017 06:00 )             36.3     Hgb Trend: 11.7<--, 12.5<--      139  |  104  |  24<H>  ----------------------------<  124<H>  3.8   |  24  |  0.72    Ca    9.0      19 Sep 2017 06:00  Phos  2.5       Mg     1.9         TPro  7.1  /  Alb  4.0  /  TBili  0.2  /  DBili  x   /  AST  31  /  ALT  33  /  AlkPhos  102      Creatinine Trend: 0.72<--, 0.61<--  PT/INR - ( 18 Sep 2017 11:50 )   PT: 10.8 SEC;   INR: 0.96            Urinalysis Basic - ( 18 Sep 2017 19:25 )    Color: x / Appearance: CLEAR / S.009 / pH: 7.0  Gluc: >1000 / Ketone: NEGATIVE  / Bili: NEGATIVE / Urobili: NORMAL E.U.   Blood: NEGATIVE / Protein: NEGATIVE / Nitrite: NEGATIVE   Leuk Esterase: NEGATIVE / RBC: 0-2 / WBC 2-5   Sq Epi: x / Non Sq Epi: x / Bacteria: x        Venous Blood Gas:   @ 11:50  7.37/51/32/27/56.3  VBG Lactate: 1.7      MICROBIOLOGY:     RADIOLOGY:  [x] Reviewed and interpreted by me    PULMONARY FUNCTION TESTS:    EKG:

## 2017-09-19 NOTE — H&P ADULT - PROBLEM SELECTOR PLAN 1
Admit to telemetry. Pt denies having chest pain.   Trend CE. NST ordered.   check cbc,tsh,lipid, hemoglobin a1c, bmp with mag and phos.  d/w Dr. Mireles   f/u MD note

## 2017-09-19 NOTE — CONSULT NOTE ADULT - ASSESSMENT
This is a 62-year-old woman with a history of Bronchiectasis, allergic bronchopulmonary aspergillosis (s/p Itraconazole 2015), frrequent steroid and abx use, though no history of chronic steroids, and diabetes who presents with worsening cough, chest tightness, and pleuritic chest pain with wheezing consistent with asthma/bronchiectasis exacerbation.  - Check sputum culture, RVP, Urine legionella, HIV, Quantiferon gold, immunoglobulin panel (including IgG, IgE levels), RF/CCP, aspergillus ab and aspergillus precipitins  - solemdorol 40iv q12, duoneb q6, singulair qhs  - Would start Ceftriaxone/Azithromycin pending cultures.   - follow up chest ct.

## 2017-09-19 NOTE — H&P ADULT - PROBLEM SELECTOR PLAN 2
Patient has uncontrolled diabetes.   Will need endocrine consult in AM.   stop po medications and start sliding scale.   Low carb diet.

## 2017-09-19 NOTE — CONSULT NOTE ADULT - ATTENDING COMMENTS
62F uncontrolled DM2 exacerbated by steroids. While inpatient basal bolus. For dc recommend basal insulin plus metformin. DM education prior to dc. [facility]

## 2017-09-19 NOTE — CONSULT NOTE ADULT - SUBJECTIVE AND OBJECTIVE BOX
HPI:  61 y/o F with T2DM admitted for exacerbation of asthma/bronchiectasis. Patient was diagnosed with T2DM in 1998 while in [place]. She has been on oral medications only. She was taking a combination pill of metformin 500/pioglitazone 15/glimiperide 2. Patient has neuropathy. No known nephropathy or retinopathy. No history of CAD, CVA or PVD. Patient is on chronic steroids for asthma/bronchiectasis. She has been hospitalized multiple times for uncontrolled DM while in [place]. She checks her fingersticks in the AM , prelunch and predinner 200-400s. Has rare episodes of hypoglycemia. Admits to increased thirst and urination.        PAST MEDICAL & SURGICAL HISTORY:  Bronchitis  DM (diabetes mellitus)  History of cholecystectomy      FAMILY HISTORY:  Type 2 DM: mother and father      Social History: nonsmoker, nondrinker    Outpatient Medications: metformin/pioglitazone/glimepiride    MEDICATIONS  (STANDING):  aspirin  chewable 162 milliGRAM(s) Oral daily  heparin  Injectable 5000 Unit(s) SubCutaneous every 12 hours  insulin lispro (HumaLOG) corrective regimen sliding scale   SubCutaneous at bedtime  dextrose 5%. 1000 milliLiter(s) (50 mL/Hr) IV Continuous <Continuous>  dextrose 50% Injectable 12.5 Gram(s) IV Push once  dextrose 50% Injectable 25 Gram(s) IV Push once  dextrose 50% Injectable 25 Gram(s) IV Push once  buDESOnide  80 MICROgram(s)/formoterol 4.5 MICROgram(s) Inhaler 2 Puff(s) Inhalation two times a day  insulin lispro (HumaLOG) corrective regimen sliding scale   SubCutaneous three times a day before meals  dextrose 5%. 1000 milliLiter(s) (50 mL/Hr) IV Continuous <Continuous>  dextrose 50% Injectable 25 Gram(s) IV Push once  methylPREDNISolone sodium succinate Injectable 60 milliGRAM(s) IV Push every 8 hours    MEDICATIONS  (PRN):  dextrose Gel 1 Dose(s) Oral once PRN Blood Glucose LESS THAN 70 milliGRAM(s)/deciliter  glucagon  Injectable 1 milliGRAM(s) IntraMuscular once PRN Glucose LESS THAN 70 milligrams/deciliter  ALBUTerol    90 MICROgram(s) HFA Inhaler 2 Puff(s) Inhalation every 6 hours PRN Shortness of Breath and/or Wheezing  dextrose Gel 1 Dose(s) Oral once PRN Blood Glucose LESS THAN 70 milliGRAM(s)/deciliter  glucagon  Injectable 1 milliGRAM(s) IntraMuscular once PRN Glucose LESS THAN 70 milligrams/deciliter  ALBUTerol/ipratropium for Nebulization 3 milliLiter(s) Nebulizer every 6 hours PRN Wheezing      Allergies    No Known Allergies    Intolerances      Review of Systems:  Constitutional: No fever  Eyes: No blurry vision  Neuro: No tremors  HEENT: No pain  Cardiovascular: No chest pain, palpitations  Respiratory: + SOB, + cough  GI: No nausea, vomiting, abdominal pain  : No dysuria  Skin: no rash  Psych: no depression  Endocrine: + polyuria, polydipsia      ALL OTHER SYSTEMS REVIEWED AND NEGATIVE        PHYSICAL EXAM:  VITALS: T(C): 36.5 (09-19-17 @ 11:47)  T(F): 97.7 (09-19-17 @ 11:47), Max: 98.2 (09-18-17 @ 18:45)  HR: 88 (09-19-17 @ 11:47) (64 - 88)  BP: 131/77 (09-19-17 @ 11:47) (129/69 - 157/91)  RR:  (16 - 20)  SpO2:  (96% - 98%)  Wt(kg): --  GENERAL: NAD  EYES: No proptosis, no lid lag, anicteric  HEENT:  Atraumatic, Normocephalic, moist mucous membranes  THYROID: Normal size, no palpable nodules  RESPIRATORY: Clear to auscultation bilaterally; No rales, rhonchi, wheezing, or rubs  CARDIOVASCULAR: Regular rate and rhythm; No murmurs; no peripheral edema  GI: Soft, nontender, non distended, normal bowel sounds  SKIN: Dry, intact, No rashes or lesions  NEURO: sensation intact  PSYCH: Alert and oriented x 3, normal affect, normal mood      CAPILLARY BLOOD GLUCOSE  239 (09-19 @ 11:55)  91 (09-19 @ 08:51)  200 (09-19 @ 01:49)  330 (09-18 @ 22:26)  238 (09-18 @ 11:20)                            11.7   10.91 )-----------( 157      ( 19 Sep 2017 06:00 )             36.3       09-19    139  |  104  |  24<H>  ----------------------------<  124<H>  3.8   |  24  |  0.72    EGFR if : 106  EGFR if non : 91    Ca    9.0      09-19  Mg     1.9     09-19  Phos  2.5     09-19    TPro  7.1  /  Alb  4.0  /  TBili  0.2  /  DBili  x   /  AST  31  /  ALT  33  /  AlkPhos  102  09-18      Thyroid Function Tests:  09-19 @ 06:00 TSH 1.93 FreeT4 -- T3 -- Anti TPO -- Anti Thyroglobulin Ab -- TSI --      Hemoglobin A1C, Whole Blood: 10.8 % <H> [4.0 - 5.6] (09-18-17 @ 11:50)      09-19 Chol 130 LDL 51 HDL 74<H> Trig 59

## 2017-09-20 ENCOUNTER — TRANSCRIPTION ENCOUNTER (OUTPATIENT)
Age: 62
End: 2017-09-20

## 2017-09-20 DIAGNOSIS — J47.1 BRONCHIECTASIS WITH (ACUTE) EXACERBATION: ICD-10-CM

## 2017-09-20 DIAGNOSIS — J98.4 OTHER DISORDERS OF LUNG: ICD-10-CM

## 2017-09-20 LAB
BACTERIA UR CULT: SIGNIFICANT CHANGE UP
BUN SERPL-MCNC: 23 MG/DL — SIGNIFICANT CHANGE UP (ref 7–23)
CALCIUM SERPL-MCNC: 9.4 MG/DL — SIGNIFICANT CHANGE UP (ref 8.4–10.5)
CHLORIDE SERPL-SCNC: 103 MMOL/L — SIGNIFICANT CHANGE UP (ref 98–107)
CO2 SERPL-SCNC: 21 MMOL/L — LOW (ref 22–31)
CREAT SERPL-MCNC: 0.58 MG/DL — SIGNIFICANT CHANGE UP (ref 0.5–1.3)
CULTURE - ACID FAST SMEAR CONCENTRATED: SIGNIFICANT CHANGE UP
GLUCOSE SERPL-MCNC: 199 MG/DL — HIGH (ref 70–99)
HCT VFR BLD CALC: 40.6 % — SIGNIFICANT CHANGE UP (ref 34.5–45)
HGB BLD-MCNC: 13.3 G/DL — SIGNIFICANT CHANGE UP (ref 11.5–15.5)
MAGNESIUM SERPL-MCNC: 2 MG/DL — SIGNIFICANT CHANGE UP (ref 1.6–2.6)
MCHC RBC-ENTMCNC: 26.6 PG — LOW (ref 27–34)
MCHC RBC-ENTMCNC: 32.8 % — SIGNIFICANT CHANGE UP (ref 32–36)
MCV RBC AUTO: 81.2 FL — SIGNIFICANT CHANGE UP (ref 80–100)
NRBC # FLD: 0 — SIGNIFICANT CHANGE UP
PHOSPHATE SERPL-MCNC: 3.8 MG/DL — SIGNIFICANT CHANGE UP (ref 2.5–4.5)
PLATELET # BLD AUTO: 190 K/UL — SIGNIFICANT CHANGE UP (ref 150–400)
PMV BLD: 10.7 FL — SIGNIFICANT CHANGE UP (ref 7–13)
POTASSIUM SERPL-MCNC: 4 MMOL/L — SIGNIFICANT CHANGE UP (ref 3.5–5.3)
POTASSIUM SERPL-SCNC: 4 MMOL/L — SIGNIFICANT CHANGE UP (ref 3.5–5.3)
RBC # BLD: 5 M/UL — SIGNIFICANT CHANGE UP (ref 3.8–5.2)
RBC # FLD: 15.2 % — HIGH (ref 10.3–14.5)
SODIUM SERPL-SCNC: 138 MMOL/L — SIGNIFICANT CHANGE UP (ref 135–145)
SPECIMEN SOURCE: SIGNIFICANT CHANGE UP
SPECIMEN SOURCE: SIGNIFICANT CHANGE UP
WBC # BLD: 12.37 K/UL — HIGH (ref 3.8–10.5)
WBC # FLD AUTO: 12.37 K/UL — HIGH (ref 3.8–10.5)

## 2017-09-20 PROCEDURE — 12345: CPT | Mod: NC

## 2017-09-20 PROCEDURE — 99223 1ST HOSP IP/OBS HIGH 75: CPT

## 2017-09-20 PROCEDURE — 99232 SBSQ HOSP IP/OBS MODERATE 35: CPT

## 2017-09-20 PROCEDURE — 99233 SBSQ HOSP IP/OBS HIGH 50: CPT | Mod: GC

## 2017-09-20 RX ORDER — INSULIN LISPRO 100/ML
5 VIAL (ML) SUBCUTANEOUS
Qty: 0 | Refills: 0 | Status: DISCONTINUED | OUTPATIENT
Start: 2017-09-20 | End: 2017-09-21

## 2017-09-20 RX ADMIN — HEPARIN SODIUM 5000 UNIT(S): 5000 INJECTION INTRAVENOUS; SUBCUTANEOUS at 18:33

## 2017-09-20 RX ADMIN — INSULIN GLARGINE 9 UNIT(S): 100 INJECTION, SOLUTION SUBCUTANEOUS at 21:36

## 2017-09-20 RX ADMIN — Medication 40 MILLIGRAM(S): at 18:33

## 2017-09-20 RX ADMIN — BUDESONIDE AND FORMOTEROL FUMARATE DIHYDRATE 2 PUFF(S): 160; 4.5 AEROSOL RESPIRATORY (INHALATION) at 13:41

## 2017-09-20 RX ADMIN — Medication: at 13:40

## 2017-09-20 RX ADMIN — HEPARIN SODIUM 5000 UNIT(S): 5000 INJECTION INTRAVENOUS; SUBCUTANEOUS at 06:12

## 2017-09-20 RX ADMIN — CEFTRIAXONE 100 GRAM(S): 500 INJECTION, POWDER, FOR SOLUTION INTRAMUSCULAR; INTRAVENOUS at 18:33

## 2017-09-20 RX ADMIN — Medication 5 UNIT(S): at 13:41

## 2017-09-20 RX ADMIN — Medication 40 MILLIGRAM(S): at 06:12

## 2017-09-20 RX ADMIN — AZITHROMYCIN 250 MILLIGRAM(S): 500 TABLET, FILM COATED ORAL at 13:40

## 2017-09-20 RX ADMIN — BUDESONIDE AND FORMOTEROL FUMARATE DIHYDRATE 2 PUFF(S): 160; 4.5 AEROSOL RESPIRATORY (INHALATION) at 21:36

## 2017-09-20 RX ADMIN — Medication 3 UNIT(S): at 09:05

## 2017-09-20 RX ADMIN — MONTELUKAST 10 MILLIGRAM(S): 4 TABLET, CHEWABLE ORAL at 21:36

## 2017-09-20 RX ADMIN — Medication 1: at 09:04

## 2017-09-20 RX ADMIN — Medication 162 MILLIGRAM(S): at 13:40

## 2017-09-20 RX ADMIN — Medication: at 18:34

## 2017-09-20 RX ADMIN — Medication 5 UNIT(S): at 18:34

## 2017-09-20 NOTE — DISCHARGE NOTE ADULT - MEDICATION SUMMARY - MEDICATIONS TO TAKE
I will START or STAY ON the medications listed below when I get home from the hospital:    predniSONE 5 mg oral tablet  -- 6 tab(s) po qd x 4 days, 5 tabs po qd x 4days, 4 tabs po qd x 4 days, 3 tabs po qd x 3 days, 2 tabs po qd x 4 days, 1 tab po qd x 4 days  -- It is very important that you take or use this exactly as directed.  Do not skip doses or discontinue unless directed by your doctor.  Obtain medical advice before taking any non-prescription drugs as some may affect the action of this medication.  Take with food or milk.    -- Indication: For Bronchiectasis with acute exacerbation    aspirin 81 mg oral tablet, chewable  -- 2 tab(s) by mouth once a day  -- Indication: For Cad    metFORMIN 500 mg oral tablet  -- 1 tab(s) by mouth once a day  -- Indication: For DM (diabetes mellitus)    Basaglar KwikPen 100 units/mL subcutaneous solution  -- 10 unit(s) subcutaneous once a day (at bedtime)   -- Do not drink alcoholic beverages when taking this medication.  It is very important that you take or use this exactly as directed.  Do not skip doses or discontinue unless directed by your doctor.  Keep in refrigerator.  Do not freeze.    -- Indication: For DM (diabetes mellitus)    insulin lispro 100 units/mL subcutaneous solution  --  subcutaneous 3 times a day (before meals); 2 Unit(s) if Glucose 151 - 200  4 Unit(s) if Glucose 201 - 250  6 Unit(s) if Glucose 251 - 300  8 Unit(s) if Glucose 301 - 350  10 Unit(s) if Glucose 351 - 400  12 Unit(s) if Glucose GREATER THAN 400  -- Indication: For DM (diabetes mellitus)    clotrimazole 10 mg oral lozenge  -- 1 lozenge by mouth 5 times a day  -- Indication: For fungal infection    albuterol 90 mcg/inh inhalation aerosol  -- 2 puff(s) inhaled 4 times a day  -- Indication: For Copd    Symbicort 80 mcg-4.5 mcg/inh inhalation aerosol  -- 2 puff(s) inhaled 2 times a day  -- Indication: For Copd    nystatin 100,000 units/g topical powder  -- 1 application on skin once a day  -- Indication: For fungal infection    senna oral tablet  -- 1 tab(s) by mouth once a day  -- Indication: For Constipation    docusate sodium 100 mg oral capsule  -- 1 cap(s) by mouth 2 times a day  -- Indication: For Constipation    montelukast 10 mg oral tablet  -- 1 tab(s) by mouth once a day (at bedtime)  -- Indication: For Copd    sulfamethoxazole-trimethoprim 800 mg-160 mg oral tablet  -- 1 tab(s) by mouth 2 times a day  -- Indication: For infection    melatonin 3 mg oral tablet  -- 1 tab(s) by mouth once a day (at bedtime), As needed, Insomnia  -- Indication: For sleep aid I will START or STAY ON the medications listed below when I get home from the hospital:    predniSONE 5 mg oral tablet  -- 6 tab(s) po qd x 4 days, 5 tabs po qd x 4days, 4 tabs po qd x 4 days, 3 tabs po qd x 3 days, 2 tabs po qd x 4 days, 1 tab po qd x 4 days  -- It is very important that you take or use this exactly as directed.  Do not skip doses or discontinue unless directed by your doctor.  Obtain medical advice before taking any non-prescription drugs as some may affect the action of this medication.  Take with food or milk.    -- Indication: For Bronchiectasis with acute exacerbation    aspirin 81 mg oral tablet, chewable  -- 2 tab(s) by mouth once a day  -- Indication: For Cad    metFORMIN 1000 mg oral tablet  -- 1 tab(s) by mouth 2 times a day  -- Indication: For Type 2 diabetes mellitus with hyperglycemia, without long-term current use of insulin    Basaglar KwikPen 100 units/mL subcutaneous solution  -- 25 unit(s) subcutaneous once a day (at bedtime)   -- Do not drink alcoholic beverages when taking this medication.  It is very important that you take or use this exactly as directed.  Do not skip doses or discontinue unless directed by your doctor.  Keep in refrigerator.  Do not freeze.    -- Indication: For Type 2 diabetes mellitus with hyperglycemia, without long-term current use of insulin    clotrimazole 10 mg oral lozenge  -- 1 lozenge by mouth 5 times a day  -- Indication: For fungal infection    Symbicort 80 mcg-4.5 mcg/inh inhalation aerosol  -- 2 puff(s) inhaled 2 times a day  -- Indication: For Copd    albuterol 90 mcg/inh inhalation aerosol  -- 2 puff(s) inhaled 4 times a day -for bronchospasm MDD:8 puffs daily max   -- Indication: For Bronchiectasis with acute exacerbation    nystatin 100,000 units/g topical powder  -- 1 application on skin once a day  -- Indication: For fungal infection    senna oral tablet  -- 1 tab(s) by mouth once a day  -- Indication: For Constipation    docusate sodium 100 mg oral capsule  -- 1 cap(s) by mouth 2 times a day  -- Indication: For Constipation    montelukast 10 mg oral tablet  -- 1 tab(s) by mouth once a day (at bedtime)  -- Indication: For Copd    sulfamethoxazole-trimethoprim 800 mg-160 mg oral tablet  -- 1 tab(s) by mouth 2 times a day  -- Indication: For Bronchitis    melatonin 3 mg oral tablet  -- 1 tab(s) by mouth once a day (at bedtime), As needed, Insomnia  -- Indication: For sleep aid

## 2017-09-20 NOTE — PROGRESS NOTE ADULT - SUBJECTIVE AND OBJECTIVE BOX
CHIEF COMPLAINT: SOB cough    Interval Events: improved Breathign    REVIEW OF SYSTEMS:  [x] All other systems negative  [ ] Unable to assess ROS because ________    OBJECTIVE:  ICU Vital Signs Last 24 Hrs  T(C): 36.7 (20 Sep 2017 06:10), Max: 36.8 (19 Sep 2017 21:57)  T(F): 98 (20 Sep 2017 06:10), Max: 98.3 (19 Sep 2017 21:57)  HR: 61 (20 Sep 2017 06:10) (61 - 93)  BP: 125/74 (20 Sep 2017 06:10) (125/74 - 153/79)  BP(mean): --  ABP: --  ABP(mean): --  RR: 18 (20 Sep 2017 06:10) (18 - 18)  SpO2: 97% (20 Sep 2017 06:10) (96% - 98%)        CAPILLARY BLOOD GLUCOSE  409 (20 Sep 2017 12:27)          PHYSICAL EXAM:  General:   HEENT:   Lymph Nodes:  Neck:   Respiratory:   Cardiovascular:   Abdomen:   Extremities:   Skin:   Neurological:  Psychiatry:    HOSPITAL MEDICATIONS:  Standing Meds:  aspirin  chewable 162 milliGRAM(s) Oral daily  heparin  Injectable 5000 Unit(s) SubCutaneous every 12 hours  insulin lispro (HumaLOG) corrective regimen sliding scale   SubCutaneous at bedtime  dextrose 5%. 1000 milliLiter(s) IV Continuous <Continuous>  dextrose 50% Injectable 12.5 Gram(s) IV Push once  dextrose 50% Injectable 25 Gram(s) IV Push once  dextrose 50% Injectable 25 Gram(s) IV Push once  insulin lispro (HumaLOG) corrective regimen sliding scale   SubCutaneous three times a day before meals  dextrose 5%. 1000 milliLiter(s) IV Continuous <Continuous>  dextrose 50% Injectable 25 Gram(s) IV Push once  buDESOnide 160 MICROgram(s)/formoterol 4.5 MICROgram(s) Inhaler 2 Puff(s) Inhalation two times a day  montelukast 10 milliGRAM(s) Oral at bedtime  methylPREDNISolone sodium succinate Injectable 40 milliGRAM(s) IV Push every 12 hours  cefTRIAXone   IVPB      azithromycin   Tablet 250 milliGRAM(s) Oral daily  cefTRIAXone   IVPB 1 Gram(s) IV Intermittent every 24 hours  insulin glargine Injectable (LANTUS) 9 Unit(s) SubCutaneous at bedtime  insulin lispro Injectable (HumaLOG) 5 Unit(s) SubCutaneous three times a day before meals      PRN Meds:  dextrose Gel 1 Dose(s) Oral once PRN  glucagon  Injectable 1 milliGRAM(s) IntraMuscular once PRN  ALBUTerol    90 MICROgram(s) HFA Inhaler 2 Puff(s) Inhalation every 6 hours PRN  dextrose Gel 1 Dose(s) Oral once PRN  glucagon  Injectable 1 milliGRAM(s) IntraMuscular once PRN  ALBUTerol/ipratropium for Nebulization 3 milliLiter(s) Nebulizer every 6 hours PRN      LABS:                        13.3   12.37 )-----------( 190      ( 20 Sep 2017 07:40 )             40.6     Hgb Trend: 13.3<--, 11.7<--, 12.5<--  -20    138  |  103  |  23  ----------------------------<  199<H>  4.0   |  21<L>  |  0.58    Ca    9.4      20 Sep 2017 07:40  Phos  3.8       Mg     2.0           Creatinine Trend: 0.58<--, 0.72<--, 0.61<--    Urinalysis Basic - ( 18 Sep 2017 19:25 )    Color: x / Appearance: CLEAR / S.009 / pH: 7.0  Gluc: >1000 / Ketone: NEGATIVE  / Bili: NEGATIVE / Urobili: NORMAL E.U.   Blood: NEGATIVE / Protein: NEGATIVE / Nitrite: NEGATIVE   Leuk Esterase: NEGATIVE / RBC: 0-2 / WBC 2-5   Sq Epi: x / Non Sq Epi: x / Bacteria: x            MICROBIOLOGY:     RADIOLOGY:  [ ] Reviewed and interpreted by me    PULMONARY FUNCTION TESTS:    EKG: CHIEF COMPLAINT: SOB cough    Interval Events: improved Breathing, still SOB with mucus production.     REVIEW OF SYSTEMS:  [x] All other systems negative  [ ] Unable to assess ROS because ________    OBJECTIVE:  ICU Vital Signs Last 24 Hrs  T(C): 36.7 (20 Sep 2017 06:10), Max: 36.8 (19 Sep 2017 21:57)  T(F): 98 (20 Sep 2017 06:10), Max: 98.3 (19 Sep 2017 21:57)  HR: 61 (20 Sep 2017 06:10) (61 - 93)  BP: 125/74 (20 Sep 2017 06:10) (125/74 - 153/79)  BP(mean): --  ABP: --  ABP(mean): --  RR: 18 (20 Sep 2017 06:10) (18 - 18)  SpO2: 97% (20 Sep 2017 06:10) (96% - 98%)        CAPILLARY BLOOD GLUCOSE  409 (20 Sep 2017 12:27)          PHYSICAL EXAM:  General:   HEENT:   Lymph Nodes:  Neck:   Respiratory:   Cardiovascular:   Abdomen:   Extremities:   Skin:   Neurological:  Psychiatry:    HOSPITAL MEDICATIONS:  Standing Meds:  aspirin  chewable 162 milliGRAM(s) Oral daily  heparin  Injectable 5000 Unit(s) SubCutaneous every 12 hours  insulin lispro (HumaLOG) corrective regimen sliding scale   SubCutaneous at bedtime  dextrose 5%. 1000 milliLiter(s) IV Continuous <Continuous>  dextrose 50% Injectable 12.5 Gram(s) IV Push once  dextrose 50% Injectable 25 Gram(s) IV Push once  dextrose 50% Injectable 25 Gram(s) IV Push once  insulin lispro (HumaLOG) corrective regimen sliding scale   SubCutaneous three times a day before meals  dextrose 5%. 1000 milliLiter(s) IV Continuous <Continuous>  dextrose 50% Injectable 25 Gram(s) IV Push once  buDESOnide 160 MICROgram(s)/formoterol 4.5 MICROgram(s) Inhaler 2 Puff(s) Inhalation two times a day  montelukast 10 milliGRAM(s) Oral at bedtime  methylPREDNISolone sodium succinate Injectable 40 milliGRAM(s) IV Push every 12 hours  cefTRIAXone   IVPB      azithromycin   Tablet 250 milliGRAM(s) Oral daily  cefTRIAXone   IVPB 1 Gram(s) IV Intermittent every 24 hours  insulin glargine Injectable (LANTUS) 9 Unit(s) SubCutaneous at bedtime  insulin lispro Injectable (HumaLOG) 5 Unit(s) SubCutaneous three times a day before meals      PRN Meds:  dextrose Gel 1 Dose(s) Oral once PRN  glucagon  Injectable 1 milliGRAM(s) IntraMuscular once PRN  ALBUTerol    90 MICROgram(s) HFA Inhaler 2 Puff(s) Inhalation every 6 hours PRN  dextrose Gel 1 Dose(s) Oral once PRN  glucagon  Injectable 1 milliGRAM(s) IntraMuscular once PRN  ALBUTerol/ipratropium for Nebulization 3 milliLiter(s) Nebulizer every 6 hours PRN      LABS:                        13.3   12.37 )-----------( 190      ( 20 Sep 2017 07:40 )             40.6     Hgb Trend: 13.3<--, 11.7<--, 12.5<--  09-20    138  |  103  |  23  ----------------------------<  199<H>  4.0   |  21<L>  |  0.58    Ca    9.4      20 Sep 2017 07:40  Phos  3.8       Mg     2.0           Creatinine Trend: 0.58<--, 0.72<--, 0.61<--    Urinalysis Basic - ( 18 Sep 2017 19:25 )    Color: x / Appearance: CLEAR / S.009 / pH: 7.0  Gluc: >1000 / Ketone: NEGATIVE  / Bili: NEGATIVE / Urobili: NORMAL E.U.   Blood: NEGATIVE / Protein: NEGATIVE / Nitrite: NEGATIVE   Leuk Esterase: NEGATIVE / RBC: 0-2 / WBC 2-5   Sq Epi: x / Non Sq Epi: x / Bacteria: x            MICROBIOLOGY:     RADIOLOGY:  [ ] Reviewed and interpreted by me    PULMONARY FUNCTION TESTS:    EKG:

## 2017-09-20 NOTE — CONSULT NOTE ADULT - PROBLEM SELECTOR PROBLEM 1
Type 2 diabetes mellitus with hyperglycemia, without long-term current use of insulin
Bronchiectasis with acute exacerbation

## 2017-09-20 NOTE — DISCHARGE NOTE ADULT - PLAN OF CARE
Continue Levaquin as prescribed for additional-------------days.    follow up with Pulmonary physician within 1 week of discharge for further medical management. Target blood sugar between .  Continue with oral hyperglycemics as prescribed. Resolved You were hospitalized and treated for rule out TB.  You had a negative PPD, Quantiferon Gold, positive AFB and negative PCR.     Follow up with Pulmonologist within 1 week of discharge from hospital. Continue Levaquin as prescribed for additional 8 days. .    follow up with Pulmonary physician within 1 week of discharge for further medical management. You were hospitalized and treated for rule out TB.  You had a negative PPD, Quantiferon Gold, positive AFB and negative PCR.   You will need to follow up with pulmonologist and infectious disease doctors within 2 weeks of discharge for further results.     Follow up with Pulmonologist within 1 week of discharge from hospital. Target blood sugar between .  Continue with oral hyperglycemics and insulin as prescribed.  Continue with basal insulin at bedtime as prescribed.  Follow up with endocrinology, Dr. Jewell. You have bronchiectasis  You were hospitalized and treated for rule out TB.  You had a negative PPD, Quantiferon Gold, positive AFB and negative PCR.   You will need to follow up with pulmonologist and infectious disease doctors within 2 weeks of discharge for further results.     Follow up with Pulmonologist within 1 week of discharge from hospital. Continue bactrim as prescribed.    follow up with Pulmonary physician within 1 week of discharge for further medical management.

## 2017-09-20 NOTE — CONSULT NOTE ADULT - PROBLEM SELECTOR RECOMMENDATION 2
Pt originally from [place]. Can be 2/2 active pulmonary TB or from previous vs. new aspergillosis.  - Quant Gold pending  - AFB sputum x 1 negative. 2 more AFB sputum samples pending.  - Aspergillus ab and aspergillus precipitins pending  - C/w airborne isolation until active pulmonary TB ruled out  - Pulmonology following, f/u recs

## 2017-09-20 NOTE — CONSULT NOTE ADULT - SUBJECTIVE AND OBJECTIVE BOX
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 Pt had been taking medications that she brought with her from [place] but ran out of her supply 2 weeks PTA. As a result, pt started having fluctuating fingersticks off of her oral hypoglycemics and also having worsening of her chronic cough with yellow productive sputum and chest tightness with wheezing. Pt was seen by Endocrinology with HgbA1c found to be 10.8% here, and pt was started on Lantus 9u qhs, premeal Humalog 3u tid qAC, and HISS. Pulmonology was also consulted. Pt was admitted to tele PA service due to pt's complaint of chest tightness and ruled out for ACS with 2 sets of negative Tessy and normal nuclear stress test. Pt's CXR and CT chest showed b/l upper lobe nodular opacities, and pt was placed on airborne isolation to rule out pulmonary TB. Pt was started on azithromycin, ceftriaxone, solumedrol, symbicort, and albuterol for pulmonary symptoms. Hospital course has been complicated by RVP positive for RSV. Tele was discontinued, as pt's chest tightness most likely not cardiac in nature and 2/2 pt's pulmonary disease. Pt denies any fevers, chills, N/V, diarrhea, constipation, or urinary symptoms. Pt currently reporting minimal improvement of cough.       MEDICATIONS  (STANDING):  aspirin  chewable 162 milliGRAM(s) Oral daily  heparin  Injectable 5000 Unit(s) SubCutaneous every 12 hours  insulin lispro (HumaLOG) corrective regimen sliding scale   SubCutaneous at bedtime  dextrose 5%. 1000 milliLiter(s) (50 mL/Hr) IV Continuous <Continuous>  dextrose 50% Injectable 12.5 Gram(s) IV Push once  dextrose 50% Injectable 25 Gram(s) IV Push once  dextrose 50% Injectable 25 Gram(s) IV Push once  insulin lispro (HumaLOG) corrective regimen sliding scale   SubCutaneous three times a day before meals  dextrose 5%. 1000 milliLiter(s) (50 mL/Hr) IV Continuous <Continuous>  dextrose 50% Injectable 25 Gram(s) IV Push once  buDESOnide 160 MICROgram(s)/formoterol 4.5 MICROgram(s) Inhaler 2 Puff(s) Inhalation two times a day  montelukast 10 milliGRAM(s) Oral at bedtime  methylPREDNISolone sodium succinate Injectable 40 milliGRAM(s) IV Push every 12 hours  cefTRIAXone   IVPB      azithromycin   Tablet 250 milliGRAM(s) Oral daily  cefTRIAXone   IVPB 1 Gram(s) IV Intermittent every 24 hours  insulin glargine Injectable (LANTUS) 9 Unit(s) SubCutaneous at bedtime  insulin lispro Injectable (HumaLOG) 5 Unit(s) SubCutaneous three times a day before meals    MEDICATIONS  (PRN):  dextrose Gel 1 Dose(s) Oral once PRN Blood Glucose LESS THAN 70 milliGRAM(s)/deciliter  glucagon  Injectable 1 milliGRAM(s) IntraMuscular once PRN Glucose LESS THAN 70 milligrams/deciliter  ALBUTerol    90 MICROgram(s) HFA Inhaler 2 Puff(s) Inhalation every 6 hours PRN Shortness of Breath and/or Wheezing  dextrose Gel 1 Dose(s) Oral once PRN Blood Glucose LESS THAN 70 milliGRAM(s)/deciliter  glucagon  Injectable 1 milliGRAM(s) IntraMuscular once PRN Glucose LESS THAN 70 milligrams/deciliter  ALBUTerol/ipratropium for Nebulization 3 milliLiter(s) Nebulizer every 6 hours PRN Wheezing      Vital Signs Last 24 Hrs  T(C): 36.7 (20 Sep 2017 06:10), Max: 36.8 (19 Sep 2017 21:57)  T(F): 98 (20 Sep 2017 06:10), Max: 98.3 (19 Sep 2017 21:57)  HR: 61 (20 Sep 2017 06:10) (61 - 93)  BP: 125/74 (20 Sep 2017 06:10) (125/74 - 153/79)  BP(mean): --  RR: 18 (20 Sep 2017 06:10) (18 - 18)  SpO2: 97% (20 Sep 2017 06:10) (96% - 98%)  CAPILLARY BLOOD GLUCOSE  409 (20 Sep 2017 12:27)  190 (20 Sep 2017 08:47)  367 (19 Sep 2017 21:57)  254 (19 Sep 2017 17:06)        I&O's Summary      PHYSICAL EXAM:  GENERAL: NAD, well-developed  HEAD:  Atraumatic, Normocephalic  EYES: EOMI, PERRLA, conjunctiva and sclera clear  NECK: Supple, No JVD  CHEST/LUNG: Clear to auscultation bilaterally; No wheeze  HEART: Regular rate and rhythm; No murmurs, rubs, or gallops  ABDOMEN: Soft, Nontender, Nondistended; Bowel sounds present  EXTREMITIES:  2+ Peripheral Pulses, No clubbing, cyanosis, or edema  PSYCH: AAOx3  NEUROLOGY: non-focal  SKIN: No rashes or lesions    LABS:                        13.3   12.37 )-----------( 190      ( 20 Sep 2017 07:40 )             40.6     09-20    138  |  103  |  23  ----------------------------<  199<H>  4.0   |  21<L>  |  0.58    Ca    9.4      20 Sep 2017 07:40  Phos  3.8     09-20  Mg     2.0     -20        CARDIAC MARKERS ( 18 Sep 2017 23:20 )  x     / < 0.06 ng/mL / 30 u/L / 1.35 ng/mL / x          Urinalysis Basic - ( 18 Sep 2017 19:25 )    Color: x / Appearance: CLEAR / S.009 / pH: 7.0  Gluc: >1000 / Ketone: NEGATIVE  / Bili: NEGATIVE / Urobili: NORMAL E.U.   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 Pt had been taking medications that she brought with her from [place] but ran out of her supply 2 weeks PTA. As a result, pt started having fluctuating fingersticks off of her oral hypoglycemics and also having worsening of her chronic cough with yellow productive sputum and chest tightness with wheezing. Pt was seen by Endocrinology with HgbA1c found to be 10.8% here, and pt was started on Lantus 9u qhs, premeal Humalog 3u tid qAC, and HISS. Pulmonology was also consulted. Pt was admitted to tele PA service due to pt's complaint of chest tightness and ruled out for ACS with 2 sets of negative Tessy and normal nuclear stress test. Pt's CT chest showed b/l upper lobe nodular opacities and a new 1.8 cm cavitary lesion in RUL, and pt was placed on airborne isolation to rule out pulmonary TB. Pt was started on azithromycin, ceftriaxone, solumedrol, symbicort, singulair, and albuterol for pulmonary symptoms. Hospital course has been complicated by RVP positive for RSV. Tele was discontinued, as pt's chest tightness most likely not cardiac in nature and 2/2 pt's pulmonary disease. Pt denies any fevers, chills, N/V, diarrhea, constipation, or urinary symptoms. Pt currently reporting minimal improvement of cough.       MEDICATIONS  (STANDING):  aspirin  chewable 162 milliGRAM(s) Oral daily  heparin  Injectable 5000 Unit(s) SubCutaneous every 12 hours  insulin lispro (HumaLOG) corrective regimen sliding scale   SubCutaneous at bedtime  dextrose 5%. 1000 milliLiter(s) (50 mL/Hr) IV Continuous <Continuous>  dextrose 50% Injectable 12.5 Gram(s) IV Push once  dextrose 50% Injectable 25 Gram(s) IV Push once  dextrose 50% Injectable 25 Gram(s) IV Push once  insulin lispro (HumaLOG) corrective regimen sliding scale   SubCutaneous three times a day before meals  dextrose 5%. 1000 milliLiter(s) (50 mL/Hr) IV Continuous <Continuous>  dextrose 50% Injectable 25 Gram(s) IV Push once  buDESOnide 160 MICROgram(s)/formoterol 4.5 MICROgram(s) Inhaler 2 Puff(s) Inhalation two times a day  montelukast 10 milliGRAM(s) Oral at bedtime  methylPREDNISolone sodium succinate Injectable 40 milliGRAM(s) IV Push every 12 hours  cefTRIAXone   IVPB      azithromycin   Tablet 250 milliGRAM(s) Oral daily  cefTRIAXone   IVPB 1 Gram(s) IV Intermittent every 24 hours  insulin glargine Injectable (LANTUS) 9 Unit(s) SubCutaneous at bedtime  insulin lispro Injectable (HumaLOG) 5 Unit(s) SubCutaneous three times a day before meals    MEDICATIONS  (PRN):  dextrose Gel 1 Dose(s) Oral once PRN Blood Glucose LESS THAN 70 milliGRAM(s)/deciliter  glucagon  Injectable 1 milliGRAM(s) IntraMuscular once PRN Glucose LESS THAN 70 milligrams/deciliter  ALBUTerol    90 MICROgram(s) HFA Inhaler 2 Puff(s) Inhalation every 6 hours PRN Shortness of Breath and/or Wheezing  dextrose Gel 1 Dose(s) Oral once PRN Blood Glucose LESS THAN 70 milliGRAM(s)/deciliter  glucagon  Injectable 1 milliGRAM(s) IntraMuscular once PRN Glucose LESS THAN 70 milligrams/deciliter  ALBUTerol/ipratropium for Nebulization 3 milliLiter(s) Nebulizer every 6 hours PRN Wheezing      Vital Signs Last 24 Hrs  T(C): 36.7 (20 Sep 2017 06:10), Max: 36.8 (19 Sep 2017 21:57)  T(F): 98 (20 Sep 2017 06:10), Max: 98.3 (19 Sep 2017 21:57)  HR: 61 (20 Sep 2017 06:10) (61 - 93)  BP: 125/74 (20 Sep 2017 06:10) (125/74 - 153/79)  BP(mean): --  RR: 18 (20 Sep 2017 06:10) (18 - 18)  SpO2: 97% (20 Sep 2017 06:10) (96% - 98%)  CAPILLARY BLOOD GLUCOSE  409 (20 Sep 2017 12:27)  190 (20 Sep 2017 08:47)  367 (19 Sep 2017 21:57)  254 (19 Sep 2017 17:06)        I&O's Summary      PHYSICAL EXAM:  GENERAL: NAD, well-developed  HEAD:  Atraumatic, Normocephalic  EYES: EOMI, PERRLA, conjunctiva and sclera clear  NECK: Supple, No JVD  CHEST/LUNG: Clear to auscultation bilaterally; No wheeze  HEART: Regular rate and rhythm; No murmurs, rubs, or gallops  ABDOMEN: Soft, Nontender, Nondistended; Bowel sounds present  EXTREMITIES:  2+ Peripheral Pulses, No clubbing, cyanosis, or edema  PSYCH: AAOx3  NEUROLOGY: non-focal  SKIN: No rashes or lesions    LABS:                        13.3   12.37 )-----------( 190      ( 20 Sep 2017 07:40 )             40.6     09-20    138  |  103  |  23  ----------------------------<  199<H>  4.0   |  21<L>  |  0.58    Ca    9.4      20 Sep 2017 07:40  Phos  3.8     09-20  Mg     2.0     09-20        CARDIAC MARKERS ( 18 Sep 2017 23:20 )  x     / < 0.06 ng/mL / 30 u/L / 1.35 ng/mL / x          Urinalysis Basic - ( 18 Sep 2017 19:25 )    Color: x / Appearance: CLEAR / S.009 / pH: 7.0  Gluc: >1000 / Ketone: NEGATIVE  / Bili: NEGATIVE / Urobili: NORMAL E.U.   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 Pt had been taking medications that she brought with her from [place] but ran out of her supply 2 weeks PTA. As a result, pt started having fluctuating fingersticks off of her oral hypoglycemics and also having worsening of her chronic cough with yellow productive sputum and chest tightness with wheezing. Pt was seen by Endocrinology with HgbA1c found to be 10.8% here, and pt was started on Lantus 9u qhs, premeal Humalog 3u tid qAC, and HISS. Pulmonology was also consulted. Pt was admitted to tele PA service due to pt's complaint of chest tightness and ruled out for ACS with 2 sets of negative Tessy and normal nuclear stress test. Pt's CT chest showed b/l upper lobe nodular opacities and a new 1.8 cm cavitary lesion in RUL, and pt was placed on airborne isolation to rule out pulmonary TB. Pt was started on azithromycin, ceftriaxone, solumedrol, symbicort, singulair, and albuterol for pulmonary symptoms. Hospital course has been complicated by RVP positive for RSV. Tele was discontinued, as pt's chest tightness most likely not cardiac in nature and 2/2 pt's pulmonary disease. Pt denies any fevers, chills, N/V, diarrhea, constipation, or urinary symptoms. Pt currently reporting minimal improvement of cough.       MEDICATIONS  (STANDING):  aspirin  chewable 162 milliGRAM(s) Oral daily  heparin  Injectable 5000 Unit(s) SubCutaneous every 12 hours  insulin lispro (HumaLOG) corrective regimen sliding scale   SubCutaneous at bedtime  dextrose 5%. 1000 milliLiter(s) (50 mL/Hr) IV Continuous <Continuous>  dextrose 50% Injectable 12.5 Gram(s) IV Push once  dextrose 50% Injectable 25 Gram(s) IV Push once  dextrose 50% Injectable 25 Gram(s) IV Push once  insulin lispro (HumaLOG) corrective regimen sliding scale   SubCutaneous three times a day before meals  dextrose 5%. 1000 milliLiter(s) (50 mL/Hr) IV Continuous <Continuous>  dextrose 50% Injectable 25 Gram(s) IV Push once  buDESOnide 160 MICROgram(s)/formoterol 4.5 MICROgram(s) Inhaler 2 Puff(s) Inhalation two times a day  montelukast 10 milliGRAM(s) Oral at bedtime  methylPREDNISolone sodium succinate Injectable 40 milliGRAM(s) IV Push every 12 hours  cefTRIAXone   IVPB      azithromycin   Tablet 250 milliGRAM(s) Oral daily  cefTRIAXone   IVPB 1 Gram(s) IV Intermittent every 24 hours  insulin glargine Injectable (LANTUS) 9 Unit(s) SubCutaneous at bedtime  insulin lispro Injectable (HumaLOG) 5 Unit(s) SubCutaneous three times a day before meals    MEDICATIONS  (PRN):  dextrose Gel 1 Dose(s) Oral once PRN Blood Glucose LESS THAN 70 milliGRAM(s)/deciliter  glucagon  Injectable 1 milliGRAM(s) IntraMuscular once PRN Glucose LESS THAN 70 milligrams/deciliter  ALBUTerol    90 MICROgram(s) HFA Inhaler 2 Puff(s) Inhalation every 6 hours PRN Shortness of Breath and/or Wheezing  dextrose Gel 1 Dose(s) Oral once PRN Blood Glucose LESS THAN 70 milliGRAM(s)/deciliter  glucagon  Injectable 1 milliGRAM(s) IntraMuscular once PRN Glucose LESS THAN 70 milligrams/deciliter  ALBUTerol/ipratropium for Nebulization 3 milliLiter(s) Nebulizer every 6 hours PRN Wheezing      Vital Signs Last 24 Hrs  T(C): 36.7 (20 Sep 2017 06:10), Max: 36.8 (19 Sep 2017 21:57)  T(F): 98 (20 Sep 2017 06:10), Max: 98.3 (19 Sep 2017 21:57)  HR: 61 (20 Sep 2017 06:10) (61 - 93)  BP: 125/74 (20 Sep 2017 06:10) (125/74 - 153/79)  BP(mean): --  RR: 18 (20 Sep 2017 06:10) (18 - 18)  SpO2: 97% (20 Sep 2017 06:10) (96% - 98%)  CAPILLARY BLOOD GLUCOSE  409 (20 Sep 2017 12:27)  190 (20 Sep 2017 08:47)  367 (19 Sep 2017 21:57)  254 (19 Sep 2017 17:06)        I&O's Summary      PHYSICAL EXAM:  GENERAL: NAD, cachectic  HEAD:  Atraumatic, Normocephalic  EYES: EOMI, PERRLA, conjunctiva and sclera clear  NECK: Supple, No JVD  CHEST/LUNG: Coarse breath sounds, scattered expiratory wheezing  HEART: Regular rate and rhythm; No murmurs, rubs, or gallops  ABDOMEN: Soft, Nontender, Nondistended; Bowel sounds present  EXTREMITIES:  2+ Peripheral Pulses, No clubbing, cyanosis, or edema  PSYCH: AAOx3  NEUROLOGY: non-focal  SKIN: No rashes or lesions    LABS:                        13.3   12.37 )-----------( 190      ( 20 Sep 2017 07:40 )             40.6     09-20    138  |  103  |  23  ----------------------------<  199<H>  4.0   |  21<L>  |  0.58    Ca    9.4      20 Sep 2017 07:40  Phos  3.8     09-20  Mg     2.0     09-20        CARDIAC MARKERS ( 18 Sep 2017 23:20 )  x     / < 0.06 ng/mL / 30 u/L / 1.35 ng/mL / x          Urinalysis Basic - ( 18 Sep 2017 19:25 )    Color: x / Appearance: CLEAR / S.009 / pH: 7.0  Gluc: >1000 / Ketone: NEGATIVE  / Bili: NEGATIVE / Urobili: NORMAL E.U.   Blood: NEGATIVE / Protein: NEGATIVE / Nitrite: NEGATIVE   Leuk Esterase: NEGATIVE / RBC: 0-2 / WBC 2-5   Sq Epi: x / Non Sq Epi: x / Bacteria: x        RADIOLOGY & ADDITIONAL TESTS:    Imaging Personally Reviewed:    Consultant(s) Notes Reviewed:      Care Discussed with Consultants/Other Providers:

## 2017-09-20 NOTE — DISCHARGE NOTE ADULT - PROVIDER TOKENS
TOKEN:'[id number]',FREE:[LAST:[Shawna],FIRST:[Arvin],PHONE:[phone]:[(   )    -]],FREE:[LAST:[[facility]],PHONE:[phone]],FAX:[(   )    -]],TOKEN:'[id number]' TOKEN:'[id number]',TOKEN:'[id number]',FREE:[LAST:[Shawna],FIRST:[Arvin],PHONE:[phone]:[(   )    -]],TOKEN:'[id number]'

## 2017-09-20 NOTE — DISCHARGE NOTE ADULT - MEDICATION SUMMARY - MEDICATIONS TO STOP TAKING
I will STOP taking the medications listed below when I get home from the hospital:    pioglitazone 15 mg oral tablet  -- 1 tab(s) by mouth once a day    glimepiride 2 mg oral tablet  -- 1 tab(s) by mouth once a day I will STOP taking the medications listed below when I get home from the hospital:    metFORMIN 500 mg oral tablet  -- 1 tab(s) by mouth once a day    pioglitazone 15 mg oral tablet  -- 1 tab(s) by mouth once a day    glimepiride 2 mg oral tablet  -- 1 tab(s) by mouth once a day

## 2017-09-20 NOTE — DISCHARGE NOTE ADULT - CARE PROVIDERS DIRECT ADDRESSES
[email or web address],[email or web address],[email or web address]

## 2017-09-20 NOTE — CONSULT NOTE ADULT - ATTENDING COMMENTS
Agree with Housestaff Note Above, edits made where appropriate, case discussed with housestaff    Patient seen and examined. This is a 62F who was originally admitted and ruled out for ACS. However patient with persistent respiratory symptom sof coughing and wheezing with (+) results for RSV infection and CT finding of new cavitary lesion. Request from primary team to transfer to medical service. Patient seen at bedside, she currently feels better than at admission but still with persistent coughs and wheezes. Otherwise ROS negative.  VSS  General: NAD, non-toxic appearing, speaking in full sentences   HEENT: EOMI, PERRLA, no conjunctival pallor, MMM, no JVD, no thyromegaly, neck supple, trachea midline  CV: S1S2 RRR no MRG  Lungs: scant wheezes, good air movement   Abdomen: soft NTND +BS   Extremities: No CCE +WWP  Skin/MSK: No rashes, preserved ROM on active & passive movement  Neuro: AAOx3, no focal deficits (5/5 strength all extremities), no sensory deficits      Labs, Imaging, Consultant notes reviewed     A/P: 62F admitted with respiratory distress 2/2 acute exacerbation of bronchiectasis with new finding of cavitary lesion  1. Bronchiectasis - steroids, duonebs, singular, chest pt, and abx for CAP coverage. Appreciate Pulm recs. Recommend ID consult  2. Cavitary lesion of lung - Await TB labs to ro TB. Also possible recurrence of ABPA. Would f/u aspergillus labs. Appreciate ID consult and Pulm recs  3. T2DM - Goal -180 while inpatient, appreciate endocrine recs.   4. DVT ppx  5. Diabetic Diet Agree with Housestaff Note Above, edits made where appropriate, case discussed with housestaff    Patient seen and examined. This is a 62F who was originally admitted and ruled out for ACS. However patient with persistent respiratory symptom sof coughing and wheezing with (+) results for RSV infection and CT finding of new cavitary lesion. Request from primary team to transfer to medical service. Patient seen at bedside, she currently feels better than at admission but still with persistent coughs and wheezes. Otherwise ROS negative.  VSS  General: NAD, non-toxic appearing, speaking in full sentences   HEENT: EOMI, PERRLA, no conjunctival pallor, MMM, no JVD, no thyromegaly, neck supple, trachea midline  CV: S1S2 RRR no MRG  Lungs: scant wheezes, good air movement   Abdomen: soft NTND +BS   Extremities: No CCE +WWP  Skin/MSK: No rashes, preserved ROM on active & passive movement  Neuro: AAOx3, no focal deficits (5/5 strength all extremities), no sensory deficits      Labs, Imaging, Consultant notes reviewed     A/P: 62F admitted with respiratory distress 2/2 acute exacerbation of bronchiectasis with new finding of cavitary lesion.   1. Bronchiectasis - steroids, duonebs, singular, chest pt, and abx for CAP coverage. Appreciate Pulm recs. Recommend ID consult  2. Cavitary lesion of lung - Await TB labs to ro TB. Also possible recurrence of ABPA. Would f/u aspergillus labs. Appreciate ID consult and Pulm recs  3. T2DM - Goal -180 while inpatient, appreciate endocrine recs.   4. DVT ppx  5. Diabetic Diet  6. Please transfer patient to Medicine Hospitalist service in AM

## 2017-09-20 NOTE — CONSULT NOTE ADULT - PROBLEM SELECTOR RECOMMENDATION 9
Check fingersticks AC and HS. While in hospital start Lantus 9U and humalog 3-3-3 before meals plus humalog low dose correctional scale Will need diabetes education and nutrition consult. Discharge on basal plus metformin 1000 BID. Follow up at [address] [place] [address] [phone]
- Urine legionella, Quantiferon gold, immunoglobulin panel (including IgG, IgE levels), aspergillus ab and aspergillus precipitins pending  - C/w IV solumedrol 40 bid, duonebs, albuterol, symbicort, singulair  - C/w chest PT, start Acapella device to mobilize and clear secretions  - C/w azithromycin and ceftriaxone  - Obtain ID consult  - Pulmonology following, f/u recs

## 2017-09-20 NOTE — DISCHARGE NOTE ADULT - CARE PROVIDER_API CALL
Dylna Wolfe), Infectious Disease  [address] [place] [address]  Phone: [phone]  Fax: [phone]    Arvin Matos  Phone: [phone]  Fax: (   )    -    [facility],   Phone: [phone]  Fax: (   )    -    Leeanne Jewell), EndocrinologyMetabDiabetes; Internal Medicine  [address] [place] [address]  Phone: [phone]  Fax: [phone] Dylan Wolfe), Infectious Disease  [address] [place] [address]  Phone: [phone]  Fax: [phone]    Leeanne Jewell), EndocrinologyMetabDiabetes; Internal Medicine  [address] [place] [address]  Phone: [phone]  Fax: [phone]    Arvin Matos  Phone: [phone]  Fax: (   )    -    Julio César Saxena), Medicine  Pulmonary Medicine  [address] [address]  Phone: [phone]  Fax: [phone]

## 2017-09-20 NOTE — PROGRESS NOTE ADULT - PROBLEM SELECTOR PLAN 1
target bg 100-180 mg/dl  would increase lantus to 10 units sq qhs  would increase humalog to 5 units sq tid ac    dc plan anticipate Basal plus metformin for discharge.  patient has [employer] insurance.  Please send prescription for Basaglar kwikpen 10 units sq qhs to her pharmacy for insurance verification.    would c/w low correction scale ac /hs

## 2017-09-20 NOTE — PROGRESS NOTE ADULT - ATTENDING COMMENTS
This is a 62-year-old [place] female with a history of Asthma, Bronchiectasis, allergic bronchopulmonary aspergillosis (s/p Itraconazole 2015), on chronic steroids, and diabetes who presents with worsening cough, chest tightness, and pleuritic chest pain with wheezing consistent with asthma/bronchiectasis exacerbation. Found to be RSV positive, also found on CT Chest to have diffuse bronchial wall thickening, mosaic attenuation, bilateral upper lobe patchy nodular opacities, tree-in-bud opacities in KELLY/LLL, and peripheral thin-walled 1.8 cm cavitary lesion in the RUL.    - Agree with airborne precautions, sputum AFB x3, f/up Quant gold TB  - Improved aeration on lung exam, decreased wheezing, continue with solumedrol 40 IV q12h for 1 more day, then change to prednisone  - Continue Symbicort, Albuterol, Singulair  - RF is normal, IgE=8 (normal), F/up Urine legionella, check sputum culture, serum galactomannan, HIV, IgG, and aspergillus ab  - Continue with Ceftriaxone/Azithromycin until final cultures

## 2017-09-20 NOTE — DISCHARGE NOTE ADULT - HOSPITAL COURSE
61 y/o F with h/o DM, bronchitis presents to the ED for sugar management. As per daughter, patient's sugars has been fluctuating from 60s to 400s upon checking fingersticks. As per daughter, patient hasn't been able to follow up with a doctor because doctor appointment is a month away.  Pt also states she has chest pressure whenever she coughs.  Pt denies any chest pain. Pt has been having a cough with yellow productive sputum. Pt denies Loc, syncope fever, chills, numbness, tingling, palpitations, N/V/D/C, dysuria, urinary/bowel incontinence or any other complaints at this time.     On admission: ACS ruled out by negative cardiac enzymes.  HgbA1C 10.8%  CXR: bilateral upper nodular opacities with bronchiectasis   9/19 pulm:asthma/bronchiectasis exacerbation, solumedrol 40mg IV q12, symbicort 160/4.5 2 puff bid, singulair 10mg qhs, nebs/Check sputum culture, RVP, Urine legionella, HIV, Quantiferon gold, immunoglobulin panel (including IgG, IgE levels), RF/CCP, aspergillus ab and aspergillus precipitins. CT Chest non-contrast. Would start Ceftriaxone/Azithromycin for now until cultures return, no history of pseudomonas or MRSA, non-toxic appearing at present, hold off on broad-spectrum abx at this time  9/19 endo:Lantus 9U and humalog 3-3-3 before meals plus humalog low dose correctional scale Will need diabetes education and nutrition consult. Discharge on basal plus metformin 1000 BID. Follow up at [address] [place] [address] [phone].  9/19 CT chest:Diffuse bilateral bronchial wall thickening with bilateral   upper lobe upper lobe nodular opacities. Additional bilateral tree-in-bud   opacities. Findings are consistent with infection.  9/19 NST: < from: Nuclear Stress Test-Pharmacologic (09.19.17 @ 08:37) >   Myocardial Perfusion SPECT results are normal. * Review of raw data shows: The study is of good technical quality. * The left ventricle was normal in size. Normal myocardial  perfusion scan,with no evidence of infarction or inducible  ischemia. * Post-stress gated wall motion analysis was performed  (LVEF > 70%;LVEDV = 63 ml.), revealing normal LV function.  --------------------------------------------------------------- 63 y/o F with h/o DM, bronchitis presents to the ED for sugar management. As per daughter, patient's sugars has been fluctuating from 60s to 400s upon checking fingersticks. As per daughter, patient hasn't been able to follow up with a doctor because doctor appointment is a month away.  Pt also states she has chest pressure whenever she coughs.  Pt denies any chest pain. Pt has been having a cough with yellow productive sputum. Pt denies Loc, syncope fever, chills, numbness, tingling, palpitations, N/V/D/C, dysuria, urinary/bowel incontinence or any other complaints at this time.     On admission: ACS ruled out by negative cardiac enzymes.  HgbA1C 10.8%. CXR: bilateral upper nodular opacities with bronchiectasis.    Pulmonary: Asthma/bronchiectasis exacerbation, solumedrol 40mg IV q12, Symbicort 160/4.5 2 puff BID, Singulair 10mg qhs, nebs, check sputum culture, RVP, Urine legionella, HIV, QuantiFeron gold, immunoglobulin panel (including IgG, IgE levels), RF/CCP, aspergillus ab and aspergillus precipitins. CT Chest non-contrast. Would start Ceftriaxone/Azithromycin for now until cultures return, no history of pseudomonas or MRSA, non-toxic appearing at present, hold off on broad-spectrum abx at this time. CT chest revealed diffuse bilateral bronchial wall thickening with bilateral upper lobe upper lobe nodular opacities. Additional bilateral tree-in-bud opacities. Findings are consistent with infection.    Endocrine c/s was called for uncontrolled DM II, recommended Lantus 9U and Humalog 3-3-3 before meals plus Humalog low dose correctional scale Will need diabetes education and nutrition consult. Discharge on basal plus metformin 1000 BID. Follow up at [address] [place] [address] [phone].    NST was done due to c/o chest pain, which was negative and telemetry was discontinued per cardiologist, Dr. Mireles. (Myocardial Perfusion SPECT results are normal. * Review of raw data shows: The study is of good technical quality. * The left ventricle was normal in size. Normal myocardial perfusion scan, with no evidence of infarction or inducible ischemia. * Post-stress gated wall motion analysis was performed (LVEF > 70%;LVEDV = 63 ml.), revealing normal LV function.) 63 y/o F with h/o DM, bronchitis presents to the ED for sugar management. As per daughter, patient's sugars has been fluctuating from 60s to 400s upon checking fingersticks. As per daughter, patient hasn't been able to follow up with a doctor because doctor appointment is a month away.  Pt also states she has chest pressure whenever she coughs.  Pt denies any chest pain. Pt has been having a cough with yellow productive sputum. Pt denies Loc, syncope fever, chills, numbness, tingling, palpitations, N/V/D/C, dysuria, urinary/bowel incontinence or any other complaints at this time.     On admission: ACS ruled out by negative cardiac enzymes.  HgbA1C 10.8%. CXR: bilateral upper nodular opacities with bronchiectasis.    Pulmonary: Asthma/bronchiectasis exacerbation, solumedrol 40mg IV q12, Symbicort 160/4.5 2 puff BID, Singulair 10mg qhs, nebs, check sputum culture, RVP, Urine legionella, HIV, QuantiFeron gold, immunoglobulin panel (including IgG, IgE levels), RF/CCP, aspergillus ab and aspergillus precipitins. CT Chest non-contrast. Would start Ceftriaxone/Azithromycin for now until cultures return, no history of pseudomonas or MRSA, non-toxic appearing at present, hold off on broad-spectrum abx at this time. CT chest revealed diffuse bilateral bronchial wall thickening with bilateral upper lobe upper lobe nodular opacities. Additional bilateral tree-in-bud opacities. Findings are consistent with infection.    Endocrine c/s was called for uncontrolled DM II, recommended Lantus 9U and Humalog 3-3-3 before meals plus Humalog low dose correctional scale Will need diabetes education and nutrition consult. Discharge on basal plus metformin 1000 BID. Follow up at [address] [place] [address] [phone].    NST was done due to c/o chest pain, which was negative and telemetry was discontinued per cardiologist, Dr. Mireles. (Myocardial Perfusion SPECT results are normal. * Review of raw data shows: The study is of good technical quality. * The left ventricle was normal in size. Normal myocardial perfusion scan, with no evidence of infarction or inducible ischemia. * Post-stress gated wall motion analysis was performed (LVEF > 70%;LVEDV = 63 ml.), revealing normal LV function.)    ***incomplete 61 y/o F with h/o DM, bronchitis presents to the ED for sugar management. As per daughter, patient's sugars has been fluctuating from 60s to 400s upon checking fingersticks. As per daughter, patient hasn't been able to follow up with a doctor because doctor appointment is a month away.  Pt also states she has chest pressure whenever she coughs.  Pt denies any chest pain. Pt has been having a cough with yellow productive sputum. Pt denies Loc, syncope fever, chills, numbness, tingling, palpitations, N/V/D/C, dysuria, urinary/bowel incontinence or any other complaints at this time.     On admission: ACS ruled out by negative cardiac enzymes.  HgbA1C 10.8%. CXR: bilateral upper nodular opacities with bronchiectasis.    Pulmonary: Asthma/bronchiectasis exacerbation, solumedrol 40mg IV q12, Symbicort 160/4.5 2 puff BID, Singulair 10mg qhs, nebs, check sputum culture, RVP, Urine legionella, HIV, QuantiFeron gold, immunoglobulin panel (including IgG, IgE levels), RF/CCP, aspergillus ab and aspergillus precipitins. CT Chest non-contrast. Would start Ceftriaxone/Azithromycin for now until cultures return, no history of pseudomonas or MRSA, non-toxic appearing at present, hold off on broad-spectrum abx at this time. CT chest revealed diffuse bilateral bronchial wall thickening with bilateral upper lobe upper lobe nodular opacities. Additional bilateral tree-in-bud opacities. Findings are consistent with infection. Pt was ruled out for TB with negative AFB. One of the sample was positive for AFB but  PCR was  negative. Repeat quant gold and PPD also negative. Pt was followed by ID and pulmonary as inpatient. Pt was discharged on bactrim ,was earlier on Levaquin , advised to follow up with Pulmonary  and  ID      Endocrine c/s was called for uncontrolled DM II, recommended Lantus 9U and Humalog 3-3-3 before meals plus Humalog low dose correctional scale Will need diabetes education and nutrition consult. Discharge on basal plus metformin 1000 BID. Follow up at [address] [place] [address] [phone].    NST was done due to c/o chest pain, which was negative and telemetry was discontinued per cardiologist, Dr. Mireles. (Myocardial Perfusion SPECT results are normal. * Review of raw data shows: The study is of good technical quality. * The left ventricle was normal in size. Normal myocardial perfusion scan, with no evidence of infarction or inducible ischemia. * Post-stress gated wall motion analysis was performed (LVEF > 70%;LVEDV = 63 ml.), revealing normal LV function    Pt was feeling better, hemodynamically stable at the time of discharge  Plan of  care was discussed with ID, pulmonary, patient and daughter in detail prior to discharge

## 2017-09-20 NOTE — DISCHARGE NOTE ADULT - PATIENT PORTAL LINK FT
“You can access the [employer] Patient Portal, offered by [facility], by registering with the following website: [email or web address]”

## 2017-09-20 NOTE — CONSULT NOTE ADULT - ASSESSMENT
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

## 2017-09-20 NOTE — DISCHARGE NOTE ADULT - CARE PLAN
Principal Discharge DX:	Chest pain  Goal:	Resolved  Instructions for follow-up, activity and diet:	You were hospitalized and treated for rule out TB.  You had a negative PPD, Quantiferon Gold, positive AFB and negative PCR.     Follow up with Pulmonologist within 1 week of discharge from hospital.  Secondary Diagnosis:	Bronchitis  Instructions for follow-up, activity and diet:	Continue Levaquin as prescribed for additional-------------days.    follow up with Pulmonary physician within 1 week of discharge for further medical management.  Secondary Diagnosis:	DM (diabetes mellitus)  Instructions for follow-up, activity and diet:	Target blood sugar between .  Continue with oral hyperglycemics as prescribed. Principal Discharge DX:	Chest pain  Goal:	Resolved  Instructions for follow-up, activity and diet:	You were hospitalized and treated for rule out TB.  You had a negative PPD, Quantiferon Gold, positive AFB and negative PCR.     Follow up with Pulmonologist within 1 week of discharge from hospital.  Secondary Diagnosis:	Bronchitis  Instructions for follow-up, activity and diet:	Continue Levaquin as prescribed for additional 8 days. .    follow up with Pulmonary physician within 1 week of discharge for further medical management.  Secondary Diagnosis:	DM (diabetes mellitus)  Instructions for follow-up, activity and diet:	Target blood sugar between .  Continue with oral hyperglycemics as prescribed. Principal Discharge DX:	Chest pain  Goal:	Resolved  Instructions for follow-up, activity and diet:	You were hospitalized and treated for rule out TB.  You had a negative PPD, Quantiferon Gold, positive AFB and negative PCR.   You will need to follow up with pulmonologist and infectious disease doctors within 2 weeks of discharge for further results.     Follow up with Pulmonologist within 1 week of discharge from hospital.  Secondary Diagnosis:	Bronchitis  Instructions for follow-up, activity and diet:	Continue Levaquin as prescribed for additional 8 days. .    follow up with Pulmonary physician within 1 week of discharge for further medical management.  Secondary Diagnosis:	DM (diabetes mellitus)  Instructions for follow-up, activity and diet:	Target blood sugar between .  Continue with oral hyperglycemics and insulin as prescribed.  Continue with basal insulin at bedtime as prescribed.  Follow up with endocrinology, Dr. Jewell. Principal Discharge DX:	Chest pain  Goal:	Resolved  Instructions for follow-up, activity and diet:	You have bronchiectasis  You were hospitalized and treated for rule out TB.  You had a negative PPD, Quantiferon Gold, positive AFB and negative PCR.   You will need to follow up with pulmonologist and infectious disease doctors within 2 weeks of discharge for further results.     Follow up with Pulmonologist within 1 week of discharge from hospital.  Secondary Diagnosis:	Bronchitis  Instructions for follow-up, activity and diet:	Continue bactrim as prescribed.    follow up with Pulmonary physician within 1 week of discharge for further medical management.  Secondary Diagnosis:	DM (diabetes mellitus)  Instructions for follow-up, activity and diet:	Target blood sugar between .  Continue with oral hyperglycemics and insulin as prescribed.  Continue with basal insulin at bedtime as prescribed.  Follow up with endocrinology, Dr. Jewell.

## 2017-09-20 NOTE — PROGRESS NOTE ADULT - SUBJECTIVE AND OBJECTIVE BOX
Chief Complaint: uncontrolled dm2    History:  family at [facility].  patient receied education for insulin pen.  no n/v, tolerates po.  reports stomach feeling bloated after eating very little     MEDICATIONS  (STANDING):  aspirin  chewable 162 milliGRAM(s) Oral daily  heparin  Injectable 5000 Unit(s) SubCutaneous every 12 hours  insulin lispro (HumaLOG) corrective regimen sliding scale   SubCutaneous at bedtime  dextrose 5%. 1000 milliLiter(s) (50 mL/Hr) IV Continuous <Continuous>  dextrose 50% Injectable 12.5 Gram(s) IV Push once  dextrose 50% Injectable 25 Gram(s) IV Push once  dextrose 50% Injectable 25 Gram(s) IV Push once  insulin lispro (HumaLOG) corrective regimen sliding scale   SubCutaneous three times a day before meals  dextrose 5%. 1000 milliLiter(s) (50 mL/Hr) IV Continuous <Continuous>  dextrose 50% Injectable 25 Gram(s) IV Push once  buDESOnide 160 MICROgram(s)/formoterol 4.5 MICROgram(s) Inhaler 2 Puff(s) Inhalation two times a day  montelukast 10 milliGRAM(s) Oral at bedtime  methylPREDNISolone sodium succinate Injectable 40 milliGRAM(s) IV Push every 12 hours  cefTRIAXone   IVPB      azithromycin   Tablet 250 milliGRAM(s) Oral daily  cefTRIAXone   IVPB 1 Gram(s) IV Intermittent every 24 hours  insulin glargine Injectable (LANTUS) 9 Unit(s) SubCutaneous at bedtime  insulin lispro Injectable (HumaLOG) 3 Unit(s) SubCutaneous three times a day before meals    MEDICATIONS  (PRN):  dextrose Gel 1 Dose(s) Oral once PRN Blood Glucose LESS THAN 70 milliGRAM(s)/deciliter  glucagon  Injectable 1 milliGRAM(s) IntraMuscular once PRN Glucose LESS THAN 70 milligrams/deciliter  ALBUTerol    90 MICROgram(s) HFA Inhaler 2 Puff(s) Inhalation every 6 hours PRN Shortness of Breath and/or Wheezing  dextrose Gel 1 Dose(s) Oral once PRN Blood Glucose LESS THAN 70 milliGRAM(s)/deciliter  glucagon  Injectable 1 milliGRAM(s) IntraMuscular once PRN Glucose LESS THAN 70 milligrams/deciliter  ALBUTerol/ipratropium for Nebulization 3 milliLiter(s) Nebulizer every 6 hours PRN Wheezing      Allergies    No Known Allergies    Intolerances      Review of Systems:  Constitutional: No fever  GI: No nausea, vomiting, +abdominal pain  : No dysuria  Endocrine: no polyuria, polydipsia      ALL OTHER SYSTEMS REVIEWED AND NEGATIVE        PHYSICAL EXAM:  VITALS: T(C): 36.7 (09-20-17 @ 06:10)  T(F): 98 (09-20-17 @ 06:10), Max: 98.3 (09-19-17 @ 21:57)  HR: 61 (09-20-17 @ 06:10) (61 - 93)  BP: 125/74 (09-20-17 @ 06:10) (125/74 - 153/79)  RR:  (18 - 18)  SpO2:  (96% - 98%)  Wt(kg): --  GENERAL: NAD, well-groomed, well-developed  GI: Soft, nontender, non distended, normal bowel sounds  SKIN: Dry, intact, No rashes or lesions  PSYCH: Alert and oriented x 3,       CAPILLARY BLOOD GLUCOSE  409 (09-20 @ 12:27)  190 (09-20 @ 08:47)  367 (09-19 @ 21:57)  254 (09-19 @ 17:06)  239 (09-19 @ 11:55)  91 (09-19 @ 08:51)  200 (09-19 @ 01:49)  330 (09-18 @ 22:26)  238 (09-18 @ 11:20)      09-20    138  |  103  |  23  ----------------------------<  199<H>  4.0   |  21<L>  |  0.58    EGFR if : 116  EGFR if non : 100    Ca    9.4      09-20  Mg     2.0     09-20  Phos  3.8     09-20    TPro  7.1  /  Alb  4.0  /  TBili  0.2  /  DBili  x   /  AST  31  /  ALT  33  /  AlkPhos  102  09-18          Thyroid Function Tests:  09-19 @ 06:00 TSH 1.93 FreeT4 -- T3 -- Anti TPO -- Anti Thyroglobulin Ab -- TSI --      Hemoglobin A1C, Whole Blood: 10.8 % <H> [4.0 - 5.6] (09-18-17 @ 11:50)      Case discussed with: Chief Complaint: uncontrolled dm2    History:  family at [facility].  patient receied education for insulin pen.  no n/v, tolerates po.  reports stomach feeling bloated after eating very little     MEDICATIONS  (STANDING):  aspirin  chewable 162 milliGRAM(s) Oral daily  heparin  Injectable 5000 Unit(s) SubCutaneous every 12 hours  insulin lispro (HumaLOG) corrective regimen sliding scale   SubCutaneous at bedtime  dextrose 5%. 1000 milliLiter(s) (50 mL/Hr) IV Continuous <Continuous>  dextrose 50% Injectable 12.5 Gram(s) IV Push once  dextrose 50% Injectable 25 Gram(s) IV Push once  dextrose 50% Injectable 25 Gram(s) IV Push once  insulin lispro (HumaLOG) corrective regimen sliding scale   SubCutaneous three times a day before meals  dextrose 5%. 1000 milliLiter(s) (50 mL/Hr) IV Continuous <Continuous>  dextrose 50% Injectable 25 Gram(s) IV Push once  buDESOnide 160 MICROgram(s)/formoterol 4.5 MICROgram(s) Inhaler 2 Puff(s) Inhalation two times a day  montelukast 10 milliGRAM(s) Oral at bedtime  methylPREDNISolone sodium succinate Injectable 40 milliGRAM(s) IV Push every 12 hours  cefTRIAXone   IVPB      azithromycin   Tablet 250 milliGRAM(s) Oral daily  cefTRIAXone   IVPB 1 Gram(s) IV Intermittent every 24 hours  insulin glargine Injectable (LANTUS) 9 Unit(s) SubCutaneous at bedtime  insulin lispro Injectable (HumaLOG) 3 Unit(s) SubCutaneous three times a day before meals    MEDICATIONS  (PRN):  dextrose Gel 1 Dose(s) Oral once PRN Blood Glucose LESS THAN 70 milliGRAM(s)/deciliter  glucagon  Injectable 1 milliGRAM(s) IntraMuscular once PRN Glucose LESS THAN 70 milligrams/deciliter  ALBUTerol    90 MICROgram(s) HFA Inhaler 2 Puff(s) Inhalation every 6 hours PRN Shortness of Breath and/or Wheezing  dextrose Gel 1 Dose(s) Oral once PRN Blood Glucose LESS THAN 70 milliGRAM(s)/deciliter  glucagon  Injectable 1 milliGRAM(s) IntraMuscular once PRN Glucose LESS THAN 70 milligrams/deciliter  ALBUTerol/ipratropium for Nebulization 3 milliLiter(s) Nebulizer every 6 hours PRN Wheezing      Allergies    No Known Allergies    Intolerances      Review of Systems:  Constitutional: No fever  GI: No nausea, vomiting, +abdominal pain  : No dysuria  Endocrine: no polyuria, polydipsia      ALL OTHER SYSTEMS REVIEWED AND NEGATIVE        PHYSICAL EXAM:  VITALS: T(C): 36.7 (09-20-17 @ 06:10)  T(F): 98 (09-20-17 @ 06:10), Max: 98.3 (09-19-17 @ 21:57)  HR: 61 (09-20-17 @ 06:10) (61 - 93)  BP: 125/74 (09-20-17 @ 06:10) (125/74 - 153/79)  RR:  (18 - 18)  SpO2:  (96% - 98%)  Wt(kg): --  GENERAL: NAD, well-groomed, well-developed  GI: Soft, nontender, non distended, normal bowel sounds  SKIN: Dry, intact, No rashes or lesions  PSYCH: Alert and oriented x 3,       CAPILLARY BLOOD GLUCOSE  409 (09-20 @ 12:27)  190 (09-20 @ 08:47)  367 (09-19 @ 21:57)  254 (09-19 @ 17:06)  239 (09-19 @ 11:55)  91 (09-19 @ 08:51)  200 (09-19 @ 01:49)  330 (09-18 @ 22:26)  238 (09-18 @ 11:20)      09-20    138  |  103  |  23  ----------------------------<  199<H>  4.0   |  21<L>  |  0.58    EGFR if : 116  EGFR if non : 100    Ca    9.4      09-20  Mg     2.0     09-20  Phos  3.8     09-20    TPro  7.1  /  Alb  4.0  /  TBili  0.2  /  DBili  x   /  AST  31  /  ALT  33  /  AlkPhos  102  09-18          Thyroid Function Tests:  09-19 @ 06:00 TSH 1.93 FreeT4 -- T3 -- Anti TPO -- Anti Thyroglobulin Ab -- TSI --      Hemoglobin A1C, Whole Blood: 10.8 % <H> [4.0 - 5.6] (09-18-17 @ 11:50)

## 2017-09-20 NOTE — PROGRESS NOTE ADULT - SUBJECTIVE AND OBJECTIVE BOX
CHIEF COMPLAINT: complains of being anxious    MEDICATIONS:  aspirin  chewable 162 milliGRAM(s) Oral daily  heparin  Injectable 5000 Unit(s) SubCutaneous every 12 hours    cefTRIAXone   IVPB      azithromycin   Tablet 250 milliGRAM(s) Oral daily  cefTRIAXone   IVPB 1 Gram(s) IV Intermittent every 24 hours    ALBUTerol    90 MICROgram(s) HFA Inhaler 2 Puff(s) Inhalation every 6 hours PRN  ALBUTerol/ipratropium for Nebulization 3 milliLiter(s) Nebulizer every 6 hours PRN  buDESOnide 160 MICROgram(s)/formoterol 4.5 MICROgram(s) Inhaler 2 Puff(s) Inhalation two times a day  montelukast 10 milliGRAM(s) Oral at bedtime        insulin lispro (HumaLOG) corrective regimen sliding scale   SubCutaneous at bedtime  dextrose Gel 1 Dose(s) Oral once PRN  dextrose 50% Injectable 12.5 Gram(s) IV Push once  dextrose 50% Injectable 25 Gram(s) IV Push once  dextrose 50% Injectable 25 Gram(s) IV Push once  glucagon  Injectable 1 milliGRAM(s) IntraMuscular once PRN  insulin lispro (HumaLOG) corrective regimen sliding scale   SubCutaneous three times a day before meals  dextrose Gel 1 Dose(s) Oral once PRN  dextrose 50% Injectable 25 Gram(s) IV Push once  glucagon  Injectable 1 milliGRAM(s) IntraMuscular once PRN  methylPREDNISolone sodium succinate Injectable 40 milliGRAM(s) IV Push every 12 hours  insulin glargine Injectable (LANTUS) 9 Unit(s) SubCutaneous at bedtime  insulin lispro Injectable (HumaLOG) 3 Unit(s) SubCutaneous three times a day before meals    dextrose 5%. 1000 milliLiter(s) IV Continuous <Continuous>  dextrose 5%. 1000 milliLiter(s) IV Continuous <Continuous>        PHYSICAL EXAM:  T(C): 36.7 (09-20-17 @ 06:10), Max: 36.8 (09-19-17 @ 21:57)  HR: 61 (09-20-17 @ 06:10) (61 - 93)  BP: 125/74 (09-20-17 @ 06:10) (125/74 - 153/79)  RR: 18 (09-20-17 @ 06:10) (18 - 18)  SpO2: 97% (09-20-17 @ 06:10) (96% - 98%)  Wt(kg): --  I&O's Summary      Appearance: Normal	  HEENT:   Normal oral mucosa, PERRL, EOMI	  Lymphatic: No lymphadenopathy  Cardiovascular: Normal S1 S2, No JVD, No murmurs, No edema  Respiratory: Mild diffuse wheezes b/l  Psychiatry: A & O x 3, Mood & affect appropriate  Gastrointestinal:  Soft, Non-tender, + BS	  Skin: No rashes, No ecchymoses, No cyanosis	  Neurologic: Non-focal  Extremities: Normal range of motion, No clubbing, cyanosis or edema      TELEMETRY: Sinus rhythm                            13.3   12.37 )-----------( 190      ( 20 Sep 2017 07:40 )             40.6     09-20    138  |  103  |  23  ----------------------------<  199<H>  4.0   |  21<L>  |  0.58    Ca    9.4      20 Sep 2017 07:40  Phos  3.8     09-20  Mg     2.0     09-20    TPro  7.1  /  Alb  4.0  /  TBili  0.2  /  DBili  x   /  AST  31  /  ALT  33  /  AlkPhos  102  09-18

## 2017-09-20 NOTE — CONSULT NOTE ADULT - PROBLEM SELECTOR RECOMMENDATION 3
Fingersticks poorly controlled.  - Endocrinology following, f/u recs for Lantus, premeal Humalog, and HISS

## 2017-09-21 LAB
BASOPHILS # BLD AUTO: 0.04 K/UL — SIGNIFICANT CHANGE UP (ref 0–0.2)
BASOPHILS NFR BLD AUTO: 0.3 % — SIGNIFICANT CHANGE UP (ref 0–2)
BUN SERPL-MCNC: 25 MG/DL — HIGH (ref 7–23)
CALCIUM SERPL-MCNC: 9.3 MG/DL — SIGNIFICANT CHANGE UP (ref 8.4–10.5)
CHLORIDE SERPL-SCNC: 100 MMOL/L — SIGNIFICANT CHANGE UP (ref 98–107)
CO2 SERPL-SCNC: 22 MMOL/L — SIGNIFICANT CHANGE UP (ref 22–31)
CREAT SERPL-MCNC: 0.62 MG/DL — SIGNIFICANT CHANGE UP (ref 0.5–1.3)
CULTURE - ACID FAST SMEAR CONCENTRATED: SIGNIFICANT CHANGE UP
EOSINOPHIL # BLD AUTO: 0.01 K/UL — SIGNIFICANT CHANGE UP (ref 0–0.5)
EOSINOPHIL NFR BLD AUTO: 0.1 % — SIGNIFICANT CHANGE UP (ref 0–6)
GLUCOSE SERPL-MCNC: 286 MG/DL — HIGH (ref 70–99)
GRAM STN SPT: SIGNIFICANT CHANGE UP
HCT VFR BLD CALC: 37 % — SIGNIFICANT CHANGE UP (ref 34.5–45)
HGB BLD-MCNC: 11.9 G/DL — SIGNIFICANT CHANGE UP (ref 11.5–15.5)
HIV 1+2 AB+HIV1 P24 AG SERPL QL IA: SIGNIFICANT CHANGE UP
IGA FLD-MCNC: 298 MG/DL — SIGNIFICANT CHANGE UP (ref 70–400)
IGG FLD-MCNC: 1196 MG/DL — SIGNIFICANT CHANGE UP (ref 700–1600)
IGM SERPL-MCNC: 100 MG/DL — SIGNIFICANT CHANGE UP (ref 40–230)
IMM GRANULOCYTES # BLD AUTO: 0.2 # — SIGNIFICANT CHANGE UP
IMM GRANULOCYTES NFR BLD AUTO: 1.4 % — SIGNIFICANT CHANGE UP (ref 0–1.5)
L PNEUMO AG UR QL: NEGATIVE — SIGNIFICANT CHANGE UP
LYMPHOCYTES # BLD AUTO: 15.3 % — SIGNIFICANT CHANGE UP (ref 13–44)
LYMPHOCYTES # BLD AUTO: 2.18 K/UL — SIGNIFICANT CHANGE UP (ref 1–3.3)
MAGNESIUM SERPL-MCNC: 2 MG/DL — SIGNIFICANT CHANGE UP (ref 1.6–2.6)
MCHC RBC-ENTMCNC: 26.2 PG — LOW (ref 27–34)
MCHC RBC-ENTMCNC: 32.2 % — SIGNIFICANT CHANGE UP (ref 32–36)
MCV RBC AUTO: 81.3 FL — SIGNIFICANT CHANGE UP (ref 80–100)
MONOCYTES # BLD AUTO: 0.92 K/UL — HIGH (ref 0–0.9)
MONOCYTES NFR BLD AUTO: 6.4 % — SIGNIFICANT CHANGE UP (ref 2–14)
NEUTROPHILS # BLD AUTO: 10.93 K/UL — HIGH (ref 1.8–7.4)
NEUTROPHILS NFR BLD AUTO: 76.5 % — SIGNIFICANT CHANGE UP (ref 43–77)
NRBC # FLD: 0 — SIGNIFICANT CHANGE UP
PHOSPHATE SERPL-MCNC: 3.6 MG/DL — SIGNIFICANT CHANGE UP (ref 2.5–4.5)
PLATELET # BLD AUTO: 198 K/UL — SIGNIFICANT CHANGE UP (ref 150–400)
PMV BLD: 12.1 FL — SIGNIFICANT CHANGE UP (ref 7–13)
POTASSIUM SERPL-MCNC: 4 MMOL/L — SIGNIFICANT CHANGE UP (ref 3.5–5.3)
POTASSIUM SERPL-SCNC: 4 MMOL/L — SIGNIFICANT CHANGE UP (ref 3.5–5.3)
RBC # BLD: 4.55 M/UL — SIGNIFICANT CHANGE UP (ref 3.8–5.2)
RBC # FLD: 15.2 % — HIGH (ref 10.3–14.5)
SODIUM SERPL-SCNC: 138 MMOL/L — SIGNIFICANT CHANGE UP (ref 135–145)
SPECIMEN SOURCE: SIGNIFICANT CHANGE UP
WBC # BLD: 14.28 K/UL — HIGH (ref 3.8–10.5)
WBC # FLD AUTO: 14.28 K/UL — HIGH (ref 3.8–10.5)

## 2017-09-21 PROCEDURE — 99232 SBSQ HOSP IP/OBS MODERATE 35: CPT

## 2017-09-21 PROCEDURE — 99222 1ST HOSP IP/OBS MODERATE 55: CPT | Mod: GC

## 2017-09-21 PROCEDURE — 99233 SBSQ HOSP IP/OBS HIGH 50: CPT

## 2017-09-21 RX ORDER — INSULIN GLARGINE 100 [IU]/ML
10 INJECTION, SOLUTION SUBCUTANEOUS AT BEDTIME
Qty: 0 | Refills: 0 | Status: DISCONTINUED | OUTPATIENT
Start: 2017-09-21 | End: 2017-09-22

## 2017-09-21 RX ORDER — INSULIN LISPRO 100/ML
7 VIAL (ML) SUBCUTANEOUS
Qty: 0 | Refills: 0 | Status: DISCONTINUED | OUTPATIENT
Start: 2017-09-21 | End: 2017-09-22

## 2017-09-21 RX ORDER — INSULIN LISPRO 100/ML
VIAL (ML) SUBCUTANEOUS
Qty: 0 | Refills: 0 | Status: DISCONTINUED | OUTPATIENT
Start: 2017-09-21 | End: 2017-09-29

## 2017-09-21 RX ADMIN — Medication 162 MILLIGRAM(S): at 12:30

## 2017-09-21 RX ADMIN — Medication 3: at 12:29

## 2017-09-21 RX ADMIN — CEFTRIAXONE 100 GRAM(S): 500 INJECTION, POWDER, FOR SOLUTION INTRAMUSCULAR; INTRAVENOUS at 15:49

## 2017-09-21 RX ADMIN — Medication 2: at 22:33

## 2017-09-21 RX ADMIN — INSULIN GLARGINE 10 UNIT(S): 100 INJECTION, SOLUTION SUBCUTANEOUS at 22:34

## 2017-09-21 RX ADMIN — Medication 5 UNIT(S): at 12:29

## 2017-09-21 RX ADMIN — Medication 5 UNIT(S): at 08:48

## 2017-09-21 RX ADMIN — AZITHROMYCIN 250 MILLIGRAM(S): 500 TABLET, FILM COATED ORAL at 12:30

## 2017-09-21 RX ADMIN — BUDESONIDE AND FORMOTEROL FUMARATE DIHYDRATE 2 PUFF(S): 160; 4.5 AEROSOL RESPIRATORY (INHALATION) at 22:33

## 2017-09-21 RX ADMIN — BUDESONIDE AND FORMOTEROL FUMARATE DIHYDRATE 2 PUFF(S): 160; 4.5 AEROSOL RESPIRATORY (INHALATION) at 08:50

## 2017-09-21 RX ADMIN — Medication 7 UNIT(S): at 18:00

## 2017-09-21 RX ADMIN — Medication 40 MILLIGRAM(S): at 05:35

## 2017-09-21 RX ADMIN — MONTELUKAST 10 MILLIGRAM(S): 4 TABLET, CHEWABLE ORAL at 22:32

## 2017-09-21 RX ADMIN — Medication 40 MILLIGRAM(S): at 18:00

## 2017-09-21 RX ADMIN — HEPARIN SODIUM 5000 UNIT(S): 5000 INJECTION INTRAVENOUS; SUBCUTANEOUS at 18:00

## 2017-09-21 RX ADMIN — Medication 8: at 18:01

## 2017-09-21 RX ADMIN — Medication 1: at 08:48

## 2017-09-21 RX ADMIN — HEPARIN SODIUM 5000 UNIT(S): 5000 INJECTION INTRAVENOUS; SUBCUTANEOUS at 05:35

## 2017-09-21 NOTE — DIETITIAN INITIAL EVALUATION ADULT. - NS AS NUTRI INTERV COLLABORAT
1)Continue mechanical soft, Consistent Carbohydrate w evening snack diet + Glucerna Shake 8oz PO 2x daily                     2)Obtain daily weights                    3)RDN remains available.  Candie Farah RDN, CD/N  pager [phone]

## 2017-09-21 NOTE — CONSULT NOTE ADULT - ATTENDING COMMENTS
62 year with bronciectasis and a small lung cavity.    She has RSV which could explain her acute exacerbation of symptoms.    Her sputum AFB is smear negative- less likely TB but I would continue isolation for now.    Not clear that cavity is new.  I would follow up serial chest images.     Discussed with pulm, they have concern for superimposed bacterial infection- continue ceftriaxone and azithromycin  Monitor for rash.

## 2017-09-21 NOTE — PROGRESS NOTE ADULT - PROBLEM SELECTOR PLAN 2
-fungal infection (?aspergillosis) vs. TB vs. ?malignancy  -r/o TB (AFB neg x1 thus far)  -f/u quantiferon gold  -f/u ID recs, on airborne isolation until ruled out for TB  -f/u pulm and ID -fungal infection (?aspergillosis) vs. TB vs. ?malignancy  -ruled out for TB with negative AFB x4  -f/u quantiferon gold  -f/u ID recs, on airborne isolation until ruled out for TB  -f/u pulm and ID -fungal infection (?aspergillosis) vs. TB vs. bronchiectasis, may not be new  -ruled out for TB with negative AFB x4  -f/u quantiferon gold  -f/u ID recs, on airborne isolation until ruled out for TB  -f/u pulm and ID

## 2017-09-21 NOTE — CONSULT NOTE ADULT - CONSULT REASON
Erythema on roof of mouth.
RUL new pulmonary cavitary lesion. r/o TB.
Transfer to Hospitalist Service
cough with sputum production
uncontrolled T2DM

## 2017-09-21 NOTE — PROGRESS NOTE ADULT - PROBLEM SELECTOR PLAN 3
-endo f/u appreciated  -adjust insulin as needed, currently on lantus 10 u hs and humalog 7 u ac, monitor FS -endo f/u appreciated  -uncontrolled DM-2 with A1c 10.8%  -adjust insulin as needed, currently on lantus 10 u hs and humalog 7 u ac, monitor FS

## 2017-09-21 NOTE — PROGRESS NOTE ADULT - SUBJECTIVE AND OBJECTIVE BOX
CHIEF COMPLAINT:    Interval Events:    REVIEW OF SYSTEMS:  Constitutional: [ ] negative [ ] fevers [ ] chills [ ] weight loss [ ] weight gain  HEENT: [ ] negative [ ] dry eyes [ ] eye irritation [ ] postnasal drip [ ] nasal congestion  CV: [ ] negative  [ ] chest pain [ ] orthopnea [ ] palpitations [ ] murmur  Resp: [ ] negative [ ] cough [ ] shortness of breath [ ] dyspnea [ ] wheezing [ ] sputum [ ] hemoptysis  GI: [ ] negative [ ] nausea [ ] vomiting [ ] diarrhea [ ] constipation [ ] abd pain [ ] dysphagia   : [ ] negative [ ] dysuria [ ] nocturia [ ] hematuria [ ] increased urinary frequency  Musculoskeletal: [ ] negative [ ] back pain [ ] myalgias [ ] arthralgias [ ] fracture  Skin: [ ] negative [ ] rash [ ] itch  Neurological: [ ] negative [ ] headache [ ] dizziness [ ] syncope [ ] weakness [ ] numbness  Psychiatric: [ ] negative [ ] anxiety [ ] depression  Endocrine: [ ] negative [ ] diabetes [ ] thyroid problem  Hematologic/Lymphatic: [ ] negative [ ] anemia [ ] bleeding problem  Allergic/Immunologic: [ ] negative [ ] itchy eyes [ ] nasal discharge [ ] hives [ ] angioedema  [ ] All other systems negative  [ ] Unable to assess ROS because ________    OBJECTIVE:  ICU Vital Signs Last 24 Hrs  T(C): 36.8 (21 Sep 2017 04:50), Max: 36.8 (21 Sep 2017 04:50)  T(F): 98.3 (21 Sep 2017 04:50), Max: 98.3 (21 Sep 2017 04:50)  HR: 77 (21 Sep 2017 04:50) (72 - 78)  BP: 126/75 (21 Sep 2017 04:50) (126/75 - 147/84)  BP(mean): --  ABP: --  ABP(mean): --  RR: 16 (21 Sep 2017 04:50) (16 - 18)  SpO2: 97% (21 Sep 2017 04:50) (97% - 99%)        CAPILLARY BLOOD GLUCOSE  237 (20 Sep 2017 21:01)          PHYSICAL EXAM:  General:   HEENT:   Lymph Nodes:  Neck:   Respiratory:   Cardiovascular:   Abdomen:   Extremities:   Skin:   Neurological:  Psychiatry:    HOSPITAL MEDICATIONS:  Standing Meds:  aspirin  chewable 162 milliGRAM(s) Oral daily  heparin  Injectable 5000 Unit(s) SubCutaneous every 12 hours  insulin lispro (HumaLOG) corrective regimen sliding scale   SubCutaneous at bedtime  dextrose 5%. 1000 milliLiter(s) IV Continuous <Continuous>  dextrose 50% Injectable 12.5 Gram(s) IV Push once  dextrose 50% Injectable 25 Gram(s) IV Push once  dextrose 50% Injectable 25 Gram(s) IV Push once  insulin lispro (HumaLOG) corrective regimen sliding scale   SubCutaneous three times a day before meals  dextrose 5%. 1000 milliLiter(s) IV Continuous <Continuous>  dextrose 50% Injectable 25 Gram(s) IV Push once  buDESOnide 160 MICROgram(s)/formoterol 4.5 MICROgram(s) Inhaler 2 Puff(s) Inhalation two times a day  montelukast 10 milliGRAM(s) Oral at bedtime  methylPREDNISolone sodium succinate Injectable 40 milliGRAM(s) IV Push every 12 hours  cefTRIAXone   IVPB      azithromycin   Tablet 250 milliGRAM(s) Oral daily  cefTRIAXone   IVPB 1 Gram(s) IV Intermittent every 24 hours  insulin glargine Injectable (LANTUS) 9 Unit(s) SubCutaneous at bedtime  insulin lispro Injectable (HumaLOG) 5 Unit(s) SubCutaneous three times a day before meals      PRN Meds:  dextrose Gel 1 Dose(s) Oral once PRN  glucagon  Injectable 1 milliGRAM(s) IntraMuscular once PRN  ALBUTerol    90 MICROgram(s) HFA Inhaler 2 Puff(s) Inhalation every 6 hours PRN  dextrose Gel 1 Dose(s) Oral once PRN  glucagon  Injectable 1 milliGRAM(s) IntraMuscular once PRN  ALBUTerol/ipratropium for Nebulization 3 milliLiter(s) Nebulizer every 6 hours PRN      LABS:                        11.9   14.28 )-----------( 198      ( 21 Sep 2017 05:11 )             37.0     Hgb Trend: 11.9<--, 13.3<--, 11.7<--, 12.5<--  09-21    138  |  100  |  25<H>  ----------------------------<  286<H>  4.0   |  22  |  0.62    Ca    9.3      21 Sep 2017 05:11  Phos  3.6     09-21  Mg     2.0     09-21      Creatinine Trend: 0.62<--, 0.58<--, 0.72<--, 0.61<--            MICROBIOLOGY:     RADIOLOGY:  [ ] Reviewed and interpreted by me    PULMONARY FUNCTION TESTS:    EKG:

## 2017-09-21 NOTE — CHART NOTE - NSCHARTNOTEFT_GEN_A_CORE
NUTRITION SERVICES     Upon Nutritional Assessment by the Registered Dietitian your patient was determined to meet criteria/ has evidence of the following diagnosis/diagnoses:  [ ] Mild Protein Calorie Malnutrition   [ ] Moderate Protein Calorie Malnutrition   [X] Severe Protein Calorie Malnutrition   [ ] Unspecified Protein Calorie Malnutrition   [ ] Underweight / BMI <19  [ ] Morbid Obesity / BMI >40    Findings as based on:  •  Comprehensive nutritional assessment and consultation    Please refer to Initial Dietitian Evaluation via documents section of [facility] EMR for further recommendations.    Candie Valenzuela RDN, CDN   pager: [phone]

## 2017-09-21 NOTE — CONSULT NOTE ADULT - SUBJECTIVE AND OBJECTIVE BOX
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 Pt had been taking medications that she brought with her from [place] but ran out of her supply 2 weeks PTA. As a result, pt started having fluctuating fingersticks off of her oral hypoglycemics and also having worsening of her chronic cough with yellow productive sputum and chest tightness with wheezing. Pt was seen by Endocrinology with HgbA1c found to be 10.8% here, and pt was started on Lantus 9u qhs, premeal Humalog 3u tid qAC, and HISS. Pulmonology was also consulted. Pt was admitted to tele PA service due to pt's complaint of chest tightness and ruled out for ACS with 2 sets of negative Tessy and normal nuclear stress test. Pt's CT chest showed b/l upper lobe nodular opacities and a new 1.8 cm cavitary lesion in RUL, and pt was placed on airborne isolation to rule out pulmonary TB.    Currently on ceftriaxone 1 mg qd, and azithromycin 250 mg PO qd.    Currently pending quantiferon gold, aspergillus antigen and antibody, HIV, AFB sputum culture x3 (1 negative).   Resulted: + RSV on RVP, GNR and GPC in chains on culture of unclear significance.  Negative: UA and Ucx infection.    PAST MEDICAL & SURGICAL HISTORY:  Bronchitis  DM (diabetes mellitus)  History of cholecystectomy      Allergies  No Known Allergies        ANTIMICROBIALS:  cefTRIAXone   IVPB    azithromycin   Tablet 250 daily  cefTRIAXone   IVPB 1 every 24 hours      OTHER MEDS: MEDICATIONS  (STANDING):  aspirin  chewable 162 daily  heparin  Injectable 5000 every 12 hours  insulin lispro (HumaLOG) corrective regimen sliding scale  at bedtime  dextrose Gel 1 once PRN  dextrose 50% Injectable 12.5 once  dextrose 50% Injectable 25 once  dextrose 50% Injectable 25 once  glucagon  Injectable 1 once PRN  ALBUTerol    90 MICROgram(s) HFA Inhaler 2 every 6 hours PRN  insulin lispro (HumaLOG) corrective regimen sliding scale  three times a day before meals  dextrose Gel 1 once PRN  dextrose 50% Injectable 25 once  glucagon  Injectable 1 once PRN  ALBUTerol/ipratropium for Nebulization 3 every 6 hours PRN  buDESOnide 160 MICROgram(s)/formoterol 4.5 MICROgram(s) Inhaler 2 two times a day  montelukast 10 at bedtime  methylPREDNISolone sodium succinate Injectable 40 every 12 hours  insulin lispro Injectable (HumaLOG) 5 three times a day before meals  insulin glargine Injectable (LANTUS) 10 at bedtime      SOCIAL HISTORY:  [ ] etoh [ ] tobacco [ ] former smoker [ ] IVDU    FAMILY HISTORY:  No pertinent family history in first degree relatives      REVIEW OF SYSTEMS  [  ] ROS unobtainable because:    [  ] All other systems negative except as noted below:	    Constitutional:  [ ] fever [ ] weight loss  Skin:  [ ] rash [ ] phlebitis	  Eyes: [ ] icterus [ ] inflammation	  ENMT: [ ] discharge [ ] thrush [ ] ulcers [ ] exudates  Respiratory: [ ] dyspnea [ ] hemoptysis [ ] cough [ ] sputum	  Cardiovascular:  [ ] chest pain [ ] palpitations [ ] edema	  Gastrointestinal:  [ ] nausea [ ] vomiting [ ] diarrhea [ ] constipation [ ] pain	  Genitourinary:  [ ] dysuria [ ] frequency [ ] hematuria [ ] discharge [ ] flank pain  Musculoskeletal:  [ ] myalgias [ ] arthralgias [ ] arthritis	  Neurological:  [ ] headache [ ] seizures	  Psychiatric:  [ ] anxiety [ ] depression	  Hematology/Lymphatics:  [ ] lymphadenopathy  Endocrine:  [ ] adrenal [ ] thyroid  Allergic/Immunologic:	 [ ] transplant [ ] seasonal    Vital Signs Last 24 Hrs  T(F): 98.3 (09-21-17 @ 04:50), Max: 98.3 (09-18-17 @ 11:20)    Vital Signs Last 24 Hrs  HR: 77 (09-21-17 @ 04:50) (72 - 78)  BP: 126/75 (09-21-17 @ 04:50) (126/75 - 147/84)  RR: 16 (09-21-17 @ 04:50)  SpO2: 97% (09-21-17 @ 04:50) (97% - 99%)  Wt(kg): --    PHYSICAL EXAM:  General: non-toxic  HEAD/EYES: anicteric, PERRL  ENT:  supple  Cardiovascular:   S1, S2  Respiratory:  clear bilaterally  GI:  soft, non-tender, normal bowel sounds  :  no CVA tenderness   Musculoskeletal:  no synovitis  Neurologic:  grossly non-focal  Skin:  no rash  Lymph: no lymphadenopathy  Psychiatric:  appropriate affect  Vascular:  no phlebitis                                11.9   14.28 )-----------( 198      ( 21 Sep 2017 05:11 )             37.0       09-21    138  |  100  |  25<H>  ----------------------------<  286<H>  4.0   |  22  |  0.62    Ca    9.3      21 Sep 2017 05:11  Phos  3.6     09-21  Mg     2.0     09-21    MICROBIOLOGY: as above.    RADIOLOGY:    < from: CT Chest No Cont (09.19.17 @ 14:35) >  LUNGS AND LARGE AIRWAYS: Debris in the trachea, otherwise patent central   airways. Diffuse bilateral bronchial wall thickening. Bilateral upper   lobe patchy nodular opacities. Additional tree-in-bud opacities are seen   in the left upper lobe and both lower lobes. 1.8 cm cavitary lesion in   the right upper lobe.  PLEURA: No pleural effusion.  VESSELS: Within normal limits.  HEART: Heart size is normal. No pericardial effusion.  MEDIASTINUM AND STEPHANIE: No lymphadenopathy.  CHEST WALL AND LOWER NECK: Within normal limits.  VISUALIZED UPPER ABDOMEN: Pneumobilia.  BONES: Within normal limits.    IMPRESSION: Diffuse bilateral bronchial wall thickening with bilateral   upper lobe upper lobe nodular opacities. Additional bilateral tree-in-bud   opacities. Findings are consistent with infection.    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 Pt had been taking medications that she brought with her from [place] but ran out of her supply 2 weeks PTA. As a result, pt started having fluctuating fingersticks off of her oral hypoglycemics and also having worsening of her chronic cough with yellow productive sputum and chest tightness with wheezing. Pt was seen by Endocrinology with HgbA1c found to be 10.8% here, and pt was started on Lantus 9u qhs, premeal Humalog 3u tid qAC, and HISS. Pulmonology was also consulted. Pt was admitted to tele PA service due to pt's complaint of chest tightness and ruled out for ACS with 2 sets of negative Tessy and normal nuclear stress test. Pt's CT chest showed b/l upper lobe nodular opacities and a new 1.8 cm cavitary lesion in RUL, and pt was placed on airborne isolation to rule out pulmonary TB.    Coughing on room entry. Most of interval history taken via daughter interpreting for patient. Immigrant from [place] with family 1 year ago. Chronically ill since 1998 with similar respiratory symptoms. Daughter states that she ran out of all of her medications 3 weeks ago that she brought from [place] and has not seen a physician in approximately 11 months. States that she has been tested every 6 months of TB including pleural biopsy in [place], all of which have been negative. Apparently patient was taking steroids and antibiotics when she felt ill (daughter describes as waxing and waning, 1 month of illness, 1 month without) for the last year.    ROS + headache, sore throat, pleuritic chest pain, palpitations, cough with productive sputum for weeks, abdominal pain and reflux with meals, frequent dysuria and frequency, weight loss, chills.  ROS negative hemoptysis. diarrhea, hematochezia, melena.    Currently on ceftriaxone 1 mg qd, and azithromycin 250 mg PO qd.    Currently pending quantiferon gold, aspergillus antigen and antibody, HIV, AFB sputum culture x3 (1 negative).   Resulted: + RSV on RVP, GNR and GPC in chains on culture of unclear significance.  Negative: UA and Ucx infection.    PAST MEDICAL & SURGICAL HISTORY:  Bronchitis  DM (diabetes mellitus)  History of cholecystectomy    Allergies  No Known Allergies    ANTIMICROBIALS:  cefTRIAXone   IVPB    azithromycin   Tablet 250 daily  cefTRIAXone   IVPB 1 every 24 hours    OTHER MEDS: MEDICATIONS  (STANDING):  aspirin  chewable 162 daily  heparin  Injectable 5000 every 12 hours  insulin lispro (HumaLOG) corrective regimen sliding scale  at bedtime  dextrose Gel 1 once PRN  dextrose 50% Injectable 12.5 once  dextrose 50% Injectable 25 once  dextrose 50% Injectable 25 once  glucagon  Injectable 1 once PRN  ALBUTerol    90 MICROgram(s) HFA Inhaler 2 every 6 hours PRN  insulin lispro (HumaLOG) corrective regimen sliding scale  three times a day before meals  dextrose Gel 1 once PRN  dextrose 50% Injectable 25 once  glucagon  Injectable 1 once PRN  ALBUTerol/ipratropium for Nebulization 3 every 6 hours PRN  buDESOnide 160 MICROgram(s)/formoterol 4.5 MICROgram(s) Inhaler 2 two times a day  montelukast 10 at bedtime  methylPREDNISolone sodium succinate Injectable 40 every 12 hours  insulin lispro Injectable (HumaLOG) 5 three times a day before meals  insulin glargine Injectable (LANTUS) 10 at bedtime    SOCIAL HISTORY:  [ ] etoh [ ] tobacco [ ] former smoker [ ] IVDU    FAMILY HISTORY:  No pertinent family history in first degree relatives    REVIEW OF SYSTEMS  [  ] ROS unobtainable because:    [  ] All other systems negative except as noted below:	    Constitutional:  [x ] fever [ x] weight loss [ x] chills  Skin:  [ ] rash [ ] phlebitis	  Eyes: [ ] icterus [ ] inflammation	  ENMT: [ ] discharge [ ] thrush [ ] ulcers [ ] exudates  Respiratory: [x ] dyspnea [ ] hemoptysis [x ] cough [x ] sputum (purulent)	  Cardiovascular:  [ x] chest pain [x ] palpitations [ ] edema	  Gastrointestinal:  [ ] nausea [ ] vomiting [ ] diarrhea [ ] constipation [ x] pain [ x] reflux	  Genitourinary:  [x ] dysuria [ x] frequency [ ] hematuria [ ] discharge [ ] flank pain  Musculoskeletal:  [ ] myalgias [ ] arthralgias [ ] arthritis	  Neurological:  [x ] headache [ ] seizures	  Psychiatric:  [ ] anxiety [ ] depression	  Hematology/Lymphatics:  [ ] lymphadenopathy  Endocrine:  [ ] adrenal [ ] thyroid  Allergic/Immunologic:	 [ ] transplant [ ] seasonal    Vital Signs Last 24 Hrs  T(F): 98.3 (09-21-17 @ 04:50), Max: 98.3 (09-18-17 @ 11:20)    Vital Signs Last 24 Hrs  HR: 77 (09-21-17 @ 04:50) (72 - 78)  BP: 126/75 (09-21-17 @ 04:50) (126/75 - 147/84)  RR: 16 (09-21-17 @ 04:50)  SpO2: 97% (09-21-17 @ 04:50) (97% - 99%)  Wt(kg): --    PHYSICAL EXAM:  General: Mild respiratory distress with cough, cachectic.  HEAD/EYES: anicteric, PERRL  ENT:  supple  Cardiovascular:   S1, S2  Respiratory:  Coarse b/l rales, stridor that clears with cough. Inspiratory/expiratory wheezing.   GI:  soft, non-tender, normal bowel sounds  :  no CVA tenderness   Musculoskeletal:  no synovitis  Neurologic:  grossly non-focal  Skin:  no rash  Lymph: no lymphadenopathy  Psychiatric:  appropriate affect  Vascular:  no phlebitis               11.9   14.28 )-----------( 198      ( 21 Sep 2017 05:11 )             37.0     09-21    138  |  100  |  25<H>  ----------------------------<  286<H>  4.0   |  22  |  0.62    Ca    9.3      21 Sep 2017 05:11  Phos  3.6     09-21  Mg     2.0     09-21    MICROBIOLOGY: as above.    RADIOLOGY:    < from: CT Chest No Cont (09.19.17 @ 14:35) >  LUNGS AND LARGE AIRWAYS: Debris in the trachea, otherwise patent central   airways. Diffuse bilateral bronchial wall thickening. Bilateral upper   lobe patchy nodular opacities. Additional tree-in-bud opacities are seen   in the left upper lobe and both lower lobes. 1.8 cm cavitary lesion in   the right upper lobe.  PLEURA: No pleural effusion.  VESSELS: Within normal limits.  HEART: Heart size is normal. No pericardial effusion.  MEDIASTINUM AND STEPHANIE: No lymphadenopathy.  CHEST WALL AND LOWER NECK: Within normal limits.  VISUALIZED UPPER ABDOMEN: Pneumobilia.  BONES: Within normal limits.    IMPRESSION: Diffuse bilateral bronchial wall thickening with bilateral   upper lobe upper lobe nodular opacities. Additional bilateral tree-in-bud   opacities. Findings are consistent with infection.    < end of copied text >

## 2017-09-21 NOTE — CONSULT NOTE ADULT - ASSESSMENT
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    # Pulmonary cavitary lesion  - TB and aspergilloma not mutually exclusive.  - c/w airborne isolation pending AFB sputum cultures.  - will discuss with ID team starting itraconazole or voriconazole.  - Agree with CAP coverage in the meantime. G + in chains could represent strep pneumo.  - Non-threatened respiratory status.  - pulmonary and endocrine following.    Jose De Jesus Morales MD  Medicine Intern rotating on ID service.  # [id number]  Please page [phone] if after 7:00 PM on weekdays or after 12 PM on weekends. 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    # Pulmonary cavitary lesion  - TB and aspergilloma/chronic aspergillosis not mutually exclusive. Agree with screening for HIV  - c/w airborne isolation pending AFB sputum cultures. Will discuss with team adding PCR sputum.  - will discuss with ID team starting itraconazole or voriconazole. Elevated leukocytosis unreliable in light of patients IV steroids.  - Agree with CAP coverage in the meantime. G + in chains could represent strep pneumo.  - Non-threatened respiratory status at this time. c/w chest PT.  - pulmonary and endocrine following.    Jose De Jesus Morales MD  Medicine Intern rotating on ID service.  # [id number]  Please page [phone] if after 7:00 PM on weekdays or after 12 PM on weekends.

## 2017-09-21 NOTE — CONSULT NOTE ADULT - SUBJECTIVE AND OBJECTIVE BOX
Patient is a 62y old  Female who presents with a chief complaint of maxillary ulcers and pain.    HPI: onset of symptoms 3 days ago; symptoms exacerbated upon use of complete denture; patient wears maxillary complete denture and mandibular cast metal removable partial denture.    PAST MEDICAL & SURGICAL HISTORY:  Bronchitis  DM (diabetes mellitus)  History of cholecystectomy    MEDICATIONS  (STANDING):  aspirin  chewable 162 milliGRAM(s) Oral daily  heparin  Injectable 5000 Unit(s) SubCutaneous every 12 hours  insulin lispro (HumaLOG) corrective regimen sliding scale   SubCutaneous at bedtime  dextrose 5%. 1000 milliLiter(s) (50 mL/Hr) IV Continuous <Continuous>  dextrose 50% Injectable 12.5 Gram(s) IV Push once  dextrose 50% Injectable 25 Gram(s) IV Push once  dextrose 50% Injectable 25 Gram(s) IV Push once  dextrose 5%. 1000 milliLiter(s) (50 mL/Hr) IV Continuous <Continuous>  dextrose 50% Injectable 25 Gram(s) IV Push once  buDESOnide 160 MICROgram(s)/formoterol 4.5 MICROgram(s) Inhaler 2 Puff(s) Inhalation two times a day  montelukast 10 milliGRAM(s) Oral at bedtime  methylPREDNISolone sodium succinate Injectable 40 milliGRAM(s) IV Push every 12 hours  cefTRIAXone   IVPB      azithromycin   Tablet 250 milliGRAM(s) Oral daily  cefTRIAXone   IVPB 1 Gram(s) IV Intermittent every 24 hours  insulin glargine Injectable (LANTUS) 10 Unit(s) SubCutaneous at bedtime  insulin lispro Injectable (HumaLOG) 7 Unit(s) SubCutaneous three times a day before meals  insulin lispro (HumaLOG) corrective regimen sliding scale   SubCutaneous three times a day before meals    MEDICATIONS  (PRN):  dextrose Gel 1 Dose(s) Oral once PRN Blood Glucose LESS THAN 70 milliGRAM(s)/deciliter  glucagon  Injectable 1 milliGRAM(s) IntraMuscular once PRN Glucose LESS THAN 70 milligrams/deciliter  ALBUTerol    90 MICROgram(s) HFA Inhaler 2 Puff(s) Inhalation every 6 hours PRN Shortness of Breath and/or Wheezing  dextrose Gel 1 Dose(s) Oral once PRN Blood Glucose LESS THAN 70 milliGRAM(s)/deciliter  glucagon  Injectable 1 milliGRAM(s) IntraMuscular once PRN Glucose LESS THAN 70 milligrams/deciliter  ALBUTerol/ipratropium for Nebulization 3 milliLiter(s) Nebulizer every 6 hours PRN Wheezing    Allergies: No Known Allergies    FAMILY HISTORY:  No pertinent family history in first degree relatives.    *SOCIAL HISTORY: daughter translated via patient's phone, as per patient's request.    Vital Signs Last 24 Hrs  T(C): 36.5 (21 Sep 2017 15:47), Max: 36.8 (21 Sep 2017 04:50)  T(F): 97.7 (21 Sep 2017 15:47), Max: 98.3 (21 Sep 2017 04:50)  HR: 79 (21 Sep 2017 15:47) (72 - 79)  BP: 131/71 (21 Sep 2017 15:47) (126/75 - 147/84)  BP(mean): --  RR: 16 (21 Sep 2017 15:47) (16 - 18)  SpO2: 99% (21 Sep 2017 15:47) (97% - 99%)    EOE: no swelling; no asymmetry; no trismus; no LAD; no tenderness to palpation.    IOE:  lips, buccal, and floor of mouth WNLs; maxilla completely edentulous; #'s 21-28 present; left anterior palatal ulcer 2mm, non-elevated; white wipeable well circumscribed circular plaques, overlaying diffuse erythematous mucosa by soft palate and midline junction.    LABS:                        11.9   14.28 )-----------( 198      ( 21 Sep 2017 05:11 )             37.0     09-21    138  |  100  |  25<H>  ----------------------------<  286<H>  4.0   |  22  |  0.62    Ca    9.3      21 Sep 2017 05:11  Phos  3.6     09-21  Mg     2.0     09-21      WBC Count: 14.28 K/uL <H> [3.8 - 10.5] (09-21 @ 05:11)  Platelet Count - Automated: 198 K/uL [150 - 400] (09-21 @ 05:11)  WBC Count: 12.37 K/uL <H> [3.8 - 10.5] (09-20 @ 07:40)  Platelet Count - Automated: 190 K/uL [150 - 400] (09-20 @ 07:40)  WBC Count: 10.91 K/uL <H> [3.8 - 10.5] (09-19 @ 06:00)  Platelet Count - Automated: 157 K/uL [150 - 400] (09-19 @ 06:00)        *DENTAL RADIOGRAPHS: none.    ASSESSMENT: suggestive of yeast infection with increased use of oral steroids; palatal ulcer.    PROCEDURE:  Verbal consent given.   EOE.  IOE.    RECOMMENDATIONS:  1) Rinse with water after administration of oral steroids.  2) Nystatin alexis.  3) Leave maxillary denture off whenever possible.  4) Dental F/U with outpatient dentist for comprehensive dental care.   3) If any exacerbation of symptoms, page dental.    Chapin Alvarez, DMD; [id number] Patient is a 62y old  Female who presents with a chief complaint of maxillary ulcers and pain.    HPI: onset of symptoms 3 days ago; symptoms exacerbated upon use of complete denture; patient wears maxillary complete denture and mandibular cast metal removable partial denture.    PAST MEDICAL & SURGICAL HISTORY:  Bronchitis  DM (diabetes mellitus)  History of cholecystectomy    MEDICATIONS  (STANDING):  aspirin  chewable 162 milliGRAM(s) Oral daily  heparin  Injectable 5000 Unit(s) SubCutaneous every 12 hours  insulin lispro (HumaLOG) corrective regimen sliding scale   SubCutaneous at bedtime  dextrose 5%. 1000 milliLiter(s) (50 mL/Hr) IV Continuous <Continuous>  dextrose 50% Injectable 12.5 Gram(s) IV Push once  dextrose 50% Injectable 25 Gram(s) IV Push once  dextrose 50% Injectable 25 Gram(s) IV Push once  dextrose 5%. 1000 milliLiter(s) (50 mL/Hr) IV Continuous <Continuous>  dextrose 50% Injectable 25 Gram(s) IV Push once  buDESOnide 160 MICROgram(s)/formoterol 4.5 MICROgram(s) Inhaler 2 Puff(s) Inhalation two times a day  montelukast 10 milliGRAM(s) Oral at bedtime  methylPREDNISolone sodium succinate Injectable 40 milliGRAM(s) IV Push every 12 hours  cefTRIAXone   IVPB      azithromycin   Tablet 250 milliGRAM(s) Oral daily  cefTRIAXone   IVPB 1 Gram(s) IV Intermittent every 24 hours  insulin glargine Injectable (LANTUS) 10 Unit(s) SubCutaneous at bedtime  insulin lispro Injectable (HumaLOG) 7 Unit(s) SubCutaneous three times a day before meals  insulin lispro (HumaLOG) corrective regimen sliding scale   SubCutaneous three times a day before meals    MEDICATIONS  (PRN):  dextrose Gel 1 Dose(s) Oral once PRN Blood Glucose LESS THAN 70 milliGRAM(s)/deciliter  glucagon  Injectable 1 milliGRAM(s) IntraMuscular once PRN Glucose LESS THAN 70 milligrams/deciliter  ALBUTerol    90 MICROgram(s) HFA Inhaler 2 Puff(s) Inhalation every 6 hours PRN Shortness of Breath and/or Wheezing  dextrose Gel 1 Dose(s) Oral once PRN Blood Glucose LESS THAN 70 milliGRAM(s)/deciliter  glucagon  Injectable 1 milliGRAM(s) IntraMuscular once PRN Glucose LESS THAN 70 milligrams/deciliter  ALBUTerol/ipratropium for Nebulization 3 milliLiter(s) Nebulizer every 6 hours PRN Wheezing    Allergies: No Known Allergies    FAMILY HISTORY:  No pertinent family history in first degree relatives.    *SOCIAL HISTORY: daughter translated via patient's phone, as per patient's request.    Vital Signs Last 24 Hrs  T(C): 36.5 (21 Sep 2017 15:47), Max: 36.8 (21 Sep 2017 04:50)  T(F): 97.7 (21 Sep 2017 15:47), Max: 98.3 (21 Sep 2017 04:50)  HR: 79 (21 Sep 2017 15:47) (72 - 79)  BP: 131/71 (21 Sep 2017 15:47) (126/75 - 147/84)  BP(mean): --  RR: 16 (21 Sep 2017 15:47) (16 - 18)  SpO2: 99% (21 Sep 2017 15:47) (97% - 99%)    EOE: no swelling; no asymmetry; no trismus; no LAD; no tenderness to palpation.    IOE:  lips, buccal, and floor of mouth WNLs; maxilla completely edentulous; #'s 21-28 present; left anterior palatal ulcer 2mm, non-elevated; white wipeable well circumscribed circular plaques, overlaying diffuse erythematous mucosa by soft palate and midline junction.    LABS:                        11.9   14.28 )-----------( 198      ( 21 Sep 2017 05:11 )             37.0     09-21    138  |  100  |  25<H>  ----------------------------<  286<H>  4.0   |  22  |  0.62    Ca    9.3      21 Sep 2017 05:11  Phos  3.6     09-21  Mg     2.0     09-21      WBC Count: 14.28 K/uL <H> [3.8 - 10.5] (09-21 @ 05:11)  Platelet Count - Automated: 198 K/uL [150 - 400] (09-21 @ 05:11)  WBC Count: 12.37 K/uL <H> [3.8 - 10.5] (09-20 @ 07:40)  Platelet Count - Automated: 190 K/uL [150 - 400] (09-20 @ 07:40)  WBC Count: 10.91 K/uL <H> [3.8 - 10.5] (09-19 @ 06:00)  Platelet Count - Automated: 157 K/uL [150 - 400] (09-19 @ 06:00)        *DENTAL RADIOGRAPHS: none.    ASSESSMENT: suggestive of yeast infection with increased use of oral steroids; palatal ulcer.    PROCEDURE:  Verbal consent given.   EOE.  IOE.    RECOMMENDATIONS:  1) Rinse with water after administration of oral steroids.  2) Nystatin alexis with dentures out.  3) Leave maxillary denture off whenever possible.  4) Dental F/U with outpatient dentist for comprehensive dental care.   5) If any exacerbation of symptoms, page dental.    Chapin Alvarez, DMD; [id number] Patient is a 62y old  Female who presents with a chief complaint of maxillary ulcers and pain.    HPI: onset of symptoms 3 days ago; symptoms exacerbated upon use of complete denture; patient wears maxillary complete denture and mandibular cast metal removable partial denture.    PAST MEDICAL & SURGICAL HISTORY:  Bronchitis  DM (diabetes mellitus)  History of cholecystectomy    MEDICATIONS  (STANDING):  aspirin  chewable 162 milliGRAM(s) Oral daily  heparin  Injectable 5000 Unit(s) SubCutaneous every 12 hours  insulin lispro (HumaLOG) corrective regimen sliding scale   SubCutaneous at bedtime  dextrose 5%. 1000 milliLiter(s) (50 mL/Hr) IV Continuous <Continuous>  dextrose 50% Injectable 12.5 Gram(s) IV Push once  dextrose 50% Injectable 25 Gram(s) IV Push once  dextrose 50% Injectable 25 Gram(s) IV Push once  dextrose 5%. 1000 milliLiter(s) (50 mL/Hr) IV Continuous <Continuous>  dextrose 50% Injectable 25 Gram(s) IV Push once  buDESOnide 160 MICROgram(s)/formoterol 4.5 MICROgram(s) Inhaler 2 Puff(s) Inhalation two times a day  montelukast 10 milliGRAM(s) Oral at bedtime  methylPREDNISolone sodium succinate Injectable 40 milliGRAM(s) IV Push every 12 hours  cefTRIAXone   IVPB      azithromycin   Tablet 250 milliGRAM(s) Oral daily  cefTRIAXone   IVPB 1 Gram(s) IV Intermittent every 24 hours  insulin glargine Injectable (LANTUS) 10 Unit(s) SubCutaneous at bedtime  insulin lispro Injectable (HumaLOG) 7 Unit(s) SubCutaneous three times a day before meals  insulin lispro (HumaLOG) corrective regimen sliding scale   SubCutaneous three times a day before meals    MEDICATIONS  (PRN):  dextrose Gel 1 Dose(s) Oral once PRN Blood Glucose LESS THAN 70 milliGRAM(s)/deciliter  glucagon  Injectable 1 milliGRAM(s) IntraMuscular once PRN Glucose LESS THAN 70 milligrams/deciliter  ALBUTerol    90 MICROgram(s) HFA Inhaler 2 Puff(s) Inhalation every 6 hours PRN Shortness of Breath and/or Wheezing  dextrose Gel 1 Dose(s) Oral once PRN Blood Glucose LESS THAN 70 milliGRAM(s)/deciliter  glucagon  Injectable 1 milliGRAM(s) IntraMuscular once PRN Glucose LESS THAN 70 milligrams/deciliter  ALBUTerol/ipratropium for Nebulization 3 milliLiter(s) Nebulizer every 6 hours PRN Wheezing    Allergies: No Known Allergies    FAMILY HISTORY:  No pertinent family history in first degree relatives.    *SOCIAL HISTORY: daughter translated via patient's phone, as per patient's request.    Vital Signs Last 24 Hrs  T(C): 36.5 (21 Sep 2017 15:47), Max: 36.8 (21 Sep 2017 04:50)  T(F): 97.7 (21 Sep 2017 15:47), Max: 98.3 (21 Sep 2017 04:50)  HR: 79 (21 Sep 2017 15:47) (72 - 79)  BP: 131/71 (21 Sep 2017 15:47) (126/75 - 147/84)  BP(mean): --  RR: 16 (21 Sep 2017 15:47) (16 - 18)  SpO2: 99% (21 Sep 2017 15:47) (97% - 99%)    EOE: no swelling; no asymmetry; no trismus; no LAD; no tenderness to palpation.    IOE:  lips, buccal, and floor of mouth WNLs; maxilla completely edentulous; #'s 21-28 present; left anterior palatal ulcer 2mm, non-elevated; white wipeable well circumscribed circular plaques, overlaying diffuse erythematous mucosa by soft palate and midline junction.    LABS:                        11.9   14.28 )-----------( 198      ( 21 Sep 2017 05:11 )             37.0     09-21    138  |  100  |  25<H>  ----------------------------<  286<H>  4.0   |  22  |  0.62    Ca    9.3      21 Sep 2017 05:11  Phos  3.6     09-21  Mg     2.0     09-21      WBC Count: 14.28 K/uL <H> [3.8 - 10.5] (09-21 @ 05:11)  Platelet Count - Automated: 198 K/uL [150 - 400] (09-21 @ 05:11)  WBC Count: 12.37 K/uL <H> [3.8 - 10.5] (09-20 @ 07:40)  Platelet Count - Automated: 190 K/uL [150 - 400] (09-20 @ 07:40)  WBC Count: 10.91 K/uL <H> [3.8 - 10.5] (09-19 @ 06:00)  Platelet Count - Automated: 157 K/uL [150 - 400] (09-19 @ 06:00)        *DENTAL RADIOGRAPHS: none.    ASSESSMENT: consistent with yeast infection and palatal ulcer due to denture wear.    PROCEDURE:  Verbal consent given.   EOE.  IOE.      RECOMMENDATIONS:  1) Rinse with warm salt water.  2) Nystatin alexis with dentures out.  3) Leave dentures out whenever possible.  4) Dental F/U with outpatient dentist for comprehensive dental care.   5) If any exacerbation of symptoms, page dental.    Chapin Alvarez, DMD; [id number]

## 2017-09-21 NOTE — DIETITIAN INITIAL EVALUATION ADULT. - OTHER INFO
Pt. Leandro speaking...  phone @ bedside and RDN offered to Pt., which she declined and preferred daughter to translate.  NKFA and no nausea/vomiting/diarrhea/constipation at this time (although Pt./daughter report Pt. is usually constipated).  Chewing difficulty attributed to "blisters" (per Pt) in oral cavity (---> notified RN and NP).  Observed good consumption of breakfast, which Pt. states is soft enough... offered softer foods which Pt. is accepting of.  Also suggested Glucerna supplement.  Pt. blood glucose values at home were variable, ~40-50's [mg/dL] in the morning and up 350-400[mg/dL] throughout day.  Encouraged consistent carbohydrate intake and medication adherence.  NKFA and Pt. declines difficulty swallowing.  Discussed nutrition recommendations w NP (appreciate diet order change).  Advised RDN remains available.

## 2017-09-21 NOTE — DIETITIAN INITIAL EVALUATION ADULT. - ETIOLOGY
In the context of acute illness (Dx RSV.  Chewing difficulty due to oral discomfort (blister/ulcers/lesions?) & chronic illness (Dx r/o TB, cavitary lesion of lung).

## 2017-09-21 NOTE — PROGRESS NOTE ADULT - PROBLEM SELECTOR PLAN 1
-pulmonary/ID inputs appreciated  -in the setting of RSV infection  -check HIV  -cont ceftriaxone/zithromax for pneumonia coverage  -cont solumedrol 40 mg bid, taper to prednisone as per pulmonary  -c/w bronchodilator duoneb q6h, singulair hs  -cont chest PT -pulmonary/ID inputs appreciated  -in the setting of RSV infection  -serum immunoglobulin levels normal  -check HIV  -cont ceftriaxone/zithromax for pneumonia coverage  -cont solumedrol 40 mg bid, taper to prednisone as per pulmonary  -c/w bronchodilator duoneb q6h, singulair hs  -cont chest PT

## 2017-09-21 NOTE — PROGRESS NOTE ADULT - PROBLEM SELECTOR PLAN 1
target bg 100-180 mg/dl  above goal   -agree with lantus increase to 10 units sq qhs  -increased humalog to 7 units sq tid ac  -increased correctional scales from low to moderate    DC plan: spoke with pharmacy liaison who confirmed patient has [facility] insurance coverage. Anticipate Basal (Basaglar Kwikpen 10 units) plus metformin 1000 mg BID for discharge. target bg 100-180 mg/dl  above goal   -agree with lantus increase to 10 units sq qhs  -increased humalog to 7 units sq tid ac  -increased correctional scales from low to moderate  -monitor steroids for taper, insulin may need to be adjusted if steroids decrease to prevent hypoglycemia     DC plan: spoke with pharmacy liaison who confirmed patient has [facility] insurance coverage. Anticipate Basal (Basaglar Kwikpen 10 units) plus metformin 1000 mg BID for discharge.    Defer c/o mouth pain to primary team, d/w provider above. C/O weight loss and wears dentures, perhaps not fitting correctly at this time.

## 2017-09-21 NOTE — PROGRESS NOTE ADULT - SUBJECTIVE AND OBJECTIVE BOX
Chief Complaint: DM 2 exacerbated by steroids     History: Prandial hyperglycemia, IV steroids continue but dose has decreased since yesterday. Spoke with patient's daughter via  phone with patient present (patient's request)  and patient is tolerating PO intake but c/o some pain in her mouth.     MEDICATIONS  (STANDING):  aspirin  chewable 162 milliGRAM(s) Oral daily  heparin  Injectable 5000 Unit(s) SubCutaneous every 12 hours  insulin lispro (HumaLOG) corrective regimen sliding scale   SubCutaneous at bedtime  dextrose 5%. 1000 milliLiter(s) (50 mL/Hr) IV Continuous <Continuous>  dextrose 50% Injectable 12.5 Gram(s) IV Push once  dextrose 50% Injectable 25 Gram(s) IV Push once  dextrose 50% Injectable 25 Gram(s) IV Push once  insulin lispro (HumaLOG) corrective regimen sliding scale   SubCutaneous three times a day before meals  dextrose 5%. 1000 milliLiter(s) (50 mL/Hr) IV Continuous <Continuous>  dextrose 50% Injectable 25 Gram(s) IV Push once  buDESOnide 160 MICROgram(s)/formoterol 4.5 MICROgram(s) Inhaler 2 Puff(s) Inhalation two times a day  montelukast 10 milliGRAM(s) Oral at bedtime  methylPREDNISolone sodium succinate Injectable 40 milliGRAM(s) IV Push every 12 hours  cefTRIAXone   IVPB      azithromycin   Tablet 250 milliGRAM(s) Oral daily  cefTRIAXone   IVPB 1 Gram(s) IV Intermittent every 24 hours  insulin lispro Injectable (HumaLOG) 5 Unit(s) SubCutaneous three times a day before meals  insulin glargine Injectable (LANTUS) 10 Unit(s) SubCutaneous at bedtime    MEDICATIONS  (PRN):  dextrose Gel 1 Dose(s) Oral once PRN Blood Glucose LESS THAN 70 milliGRAM(s)/deciliter  glucagon  Injectable 1 milliGRAM(s) IntraMuscular once PRN Glucose LESS THAN 70 milligrams/deciliter  ALBUTerol    90 MICROgram(s) HFA Inhaler 2 Puff(s) Inhalation every 6 hours PRN Shortness of Breath and/or Wheezing  dextrose Gel 1 Dose(s) Oral once PRN Blood Glucose LESS THAN 70 milliGRAM(s)/deciliter  glucagon  Injectable 1 milliGRAM(s) IntraMuscular once PRN Glucose LESS THAN 70 milligrams/deciliter  ALBUTerol/ipratropium for Nebulization 3 milliLiter(s) Nebulizer every 6 hours PRN Wheezing      No Known Allergies      Review of Systems:  Constitutional: c/o weight loss   HEENT: c/o mouth pain, uncomfortable to eat   Cardiovascular: No chest pain, palpitations  Respiratory: c/o coughing   GI: c/o nausea after lunch       PHYSICAL EXAM:  VITALS: T(C): 36.8 (09-21-17 @ 04:50)  T(F): 98.3 (09-21-17 @ 04:50), Max: 98.3 (09-21-17 @ 04:50)  HR: 77 (09-21-17 @ 04:50) (72 - 78)  BP: 126/75 (09-21-17 @ 04:50) (126/75 - 147/84)  RR:  (16 - 18)  SpO2:  (97% - 99%)  Wt(kg): --  GENERAL: NAD  EYES: No proptosis, no lid lag, anicteric  HEENT:  quarter sized area of erythema noted on palate, no sores, no thrush noted. Atraumatic, Normocephalic,  RESPIRATORY: non labored but frequent coughing fits, appeared to be non productive.   GI: Soft, nontender, non distended  SKIN: B/L LE warm/dry  PSYCH: Alert and oriented x 3, normal affect, normal mood    CAPILLARY BLOOD GLUCOSE  274 (09-21 @ 12:12)  188 (09-21 @ 08:42)  237 (09-20 @ 21:01)  261 (09-20 @ 17:14)  409 (09-20 @ 12:27)  190 (09-20 @ 08:47)  367 (09-19 @ 21:57)  254 (09-19 @ 17:06)  239 (09-19 @ 11:55)  91 (09-19 @ 08:51)  200 (09-19 @ 01:49)  330 (09-18 @ 22:26)      09-21    138  |  100  |  25<H>  ----------------------------<  286<H>  4.0   |  22  |  0.62    EGFR if : 114  EGFR if non : 98    Ca    9.3      09-21  Mg     2.0     09-21  Phos  3.6     09-21            Thyroid Function Tests:  09-19 @ 06:00 TSH 1.93 FreeT4 -- T3 -- Anti TPO -- Anti Thyroglobulin Ab -- TSI --      Hemoglobin A1C, Whole Blood: 10.8 % <H> [4.0 - 5.6] (09-18-17 @ 11:50)      Case discussed with: Anjelica Morillo, ADS Chief Complaint: DM 2 exacerbated by steroids     History: Prandial hyperglycemia, IV steroids continue but dose has decreased since yesterday. Spoke with patient's daughter via  phone with patient present (patient's request)  and patient is tolerating PO intake but c/o some pain in her mouth.     MEDICATIONS  (STANDING):  aspirin  chewable 162 milliGRAM(s) Oral daily  heparin  Injectable 5000 Unit(s) SubCutaneous every 12 hours  insulin lispro (HumaLOG) corrective regimen sliding scale   SubCutaneous at bedtime  dextrose 5%. 1000 milliLiter(s) (50 mL/Hr) IV Continuous <Continuous>  dextrose 50% Injectable 12.5 Gram(s) IV Push once  dextrose 50% Injectable 25 Gram(s) IV Push once  dextrose 50% Injectable 25 Gram(s) IV Push once  insulin lispro (HumaLOG) corrective regimen sliding scale   SubCutaneous three times a day before meals  dextrose 5%. 1000 milliLiter(s) (50 mL/Hr) IV Continuous <Continuous>  dextrose 50% Injectable 25 Gram(s) IV Push once  buDESOnide 160 MICROgram(s)/formoterol 4.5 MICROgram(s) Inhaler 2 Puff(s) Inhalation two times a day  montelukast 10 milliGRAM(s) Oral at bedtime  methylPREDNISolone sodium succinate Injectable 40 milliGRAM(s) IV Push every 12 hours  cefTRIAXone   IVPB      azithromycin   Tablet 250 milliGRAM(s) Oral daily  cefTRIAXone   IVPB 1 Gram(s) IV Intermittent every 24 hours  insulin lispro Injectable (HumaLOG) 5 Unit(s) SubCutaneous three times a day before meals  insulin glargine Injectable (LANTUS) 10 Unit(s) SubCutaneous at bedtime    MEDICATIONS  (PRN):  dextrose Gel 1 Dose(s) Oral once PRN Blood Glucose LESS THAN 70 milliGRAM(s)/deciliter  glucagon  Injectable 1 milliGRAM(s) IntraMuscular once PRN Glucose LESS THAN 70 milligrams/deciliter  ALBUTerol    90 MICROgram(s) HFA Inhaler 2 Puff(s) Inhalation every 6 hours PRN Shortness of Breath and/or Wheezing  dextrose Gel 1 Dose(s) Oral once PRN Blood Glucose LESS THAN 70 milliGRAM(s)/deciliter  glucagon  Injectable 1 milliGRAM(s) IntraMuscular once PRN Glucose LESS THAN 70 milligrams/deciliter  ALBUTerol/ipratropium for Nebulization 3 milliLiter(s) Nebulizer every 6 hours PRN Wheezing      No Known Allergies      Review of Systems:  Constitutional: c/o weight loss   HEENT: c/o mouth pain, uncomfortable to eat   Cardiovascular: No chest pain, palpitations  Respiratory: c/o coughing   GI: c/o nausea after lunch       PHYSICAL EXAM:  VITALS: T(C): 36.8 (09-21-17 @ 04:50)  T(F): 98.3 (09-21-17 @ 04:50), Max: 98.3 (09-21-17 @ 04:50)  HR: 77 (09-21-17 @ 04:50) (72 - 78)  BP: 126/75 (09-21-17 @ 04:50) (126/75 - 147/84)  RR:  (16 - 18)  SpO2:  (97% - 99%)  Wt(kg): --  GENERAL: NAD  EYES: No proptosis, no lid lag, anicteric  HEENT:  Upper dentures removed. Quarter sized area of erythema noted on palate, no sores, no thrush noted. Atraumatic, Normocephalic,  RESPIRATORY: non labored but frequent coughing fits, appeared to be non productive.   GI: Soft, nontender, non distended  SKIN: B/L LE warm/dry  PSYCH: Alert and oriented x 3, normal affect, normal mood    CAPILLARY BLOOD GLUCOSE  274 (09-21 @ 12:12)  188 (09-21 @ 08:42)  237 (09-20 @ 21:01)  261 (09-20 @ 17:14)  409 (09-20 @ 12:27)  190 (09-20 @ 08:47)  367 (09-19 @ 21:57)  254 (09-19 @ 17:06)  239 (09-19 @ 11:55)  91 (09-19 @ 08:51)  200 (09-19 @ 01:49)  330 (09-18 @ 22:26)      09-21    138  |  100  |  25<H>  ----------------------------<  286<H>  4.0   |  22  |  0.62    EGFR if : 114  EGFR if non : 98    Ca    9.3      09-21  Mg     2.0     09-21  Phos  3.6     09-21            Thyroid Function Tests:  09-19 @ 06:00 TSH 1.93 FreeT4 -- T3 -- Anti TPO -- Anti Thyroglobulin Ab -- TSI --      Hemoglobin A1C, Whole Blood: 10.8 % <H> [4.0 - 5.6] (09-18-17 @ 11:50)      Case discussed with: Anjelica Morillo, ADS

## 2017-09-21 NOTE — PROGRESS NOTE ADULT - SUBJECTIVE AND OBJECTIVE BOX
CHIEF COMPLAINT: Patient is a 62y old  female who presents with a chief complaint of fluctuating sugars (20 Sep 2017 12:22)      SUBJECTIVE / OVERNIGHT EVENTS:  Pt c/o cough with yellow sputum, dyspneic, denies h/o TB; denies night sweats/fever/hemoptysis, [place]    MEDICATIONS  (STANDING):  aspirin  chewable 162 milliGRAM(s) Oral daily  heparin  Injectable 5000 Unit(s) SubCutaneous every 12 hours  insulin lispro (HumaLOG) corrective regimen sliding scale   SubCutaneous at bedtime  dextrose 5%. 1000 milliLiter(s) (50 mL/Hr) IV Continuous <Continuous>  dextrose 50% Injectable 12.5 Gram(s) IV Push once  dextrose 50% Injectable 25 Gram(s) IV Push once  dextrose 50% Injectable 25 Gram(s) IV Push once  dextrose 5%. 1000 milliLiter(s) (50 mL/Hr) IV Continuous <Continuous>  dextrose 50% Injectable 25 Gram(s) IV Push once  buDESOnide 160 MICROgram(s)/formoterol 4.5 MICROgram(s) Inhaler 2 Puff(s) Inhalation two times a day  montelukast 10 milliGRAM(s) Oral at bedtime  methylPREDNISolone sodium succinate Injectable 40 milliGRAM(s) IV Push every 12 hours  cefTRIAXone   IVPB      azithromycin   Tablet 250 milliGRAM(s) Oral daily  cefTRIAXone   IVPB 1 Gram(s) IV Intermittent every 24 hours  insulin glargine Injectable (LANTUS) 10 Unit(s) SubCutaneous at bedtime  insulin lispro Injectable (HumaLOG) 7 Unit(s) SubCutaneous three times a day before meals  insulin lispro (HumaLOG) corrective regimen sliding scale   SubCutaneous three times a day before meals    MEDICATIONS  (PRN):  dextrose Gel 1 Dose(s) Oral once PRN Blood Glucose LESS THAN 70 milliGRAM(s)/deciliter  glucagon  Injectable 1 milliGRAM(s) IntraMuscular once PRN Glucose LESS THAN 70 milligrams/deciliter  ALBUTerol    90 MICROgram(s) HFA Inhaler 2 Puff(s) Inhalation every 6 hours PRN Shortness of Breath and/or Wheezing  dextrose Gel 1 Dose(s) Oral once PRN Blood Glucose LESS THAN 70 milliGRAM(s)/deciliter  glucagon  Injectable 1 milliGRAM(s) IntraMuscular once PRN Glucose LESS THAN 70 milligrams/deciliter  ALBUTerol/ipratropium for Nebulization 3 milliLiter(s) Nebulizer every 6 hours PRN Wheezing      VITALS:  T(F): 97.7 (09-21-17 @ 15:47), Max: 98.3 (09-21-17 @ 04:50)  HR: 79 (09-21-17 @ 15:47) (72 - 79)  BP: 131/71 (09-21-17 @ 15:47) (126/75 - 147/84)  RR: 16 (09-21-17 @ 15:47) (16 - 18)  SpO2: 99% (09-21-17 @ 15:47)      CAPILLARY BLOOD GLUCOSE  312 (09-21 @ 16:30)  274 (09-21 @ 12:12)  188 (09-21 @ 08:42)  237 (09-20 @ 21:01)  261 (09-20 @ 17:14)      PHYSICAL EXAM:  GENERAL: mild respiratory distress, well-developed  HEAD:  Atraumatic, Normocephalic  EYES: EOMI, PERRLA, conjunctiva and sclera clear  NECK: Supple, No JVD  CHEST/LUNG: diffuse wheezing and decreased air entry  HEART: Regular rate and rhythm; No murmurs, rubs, or gallops  ABDOMEN: Soft, Nontender, Nondistended; Bowel sounds present  EXTREMITIES:  2+ Peripheral Pulses, No clubbing, cyanosis, or edema  PSYCH: AAOx3  NEUROLOGY: non-focal  SKIN: No rashes or lesions    LABS:              11.9                 138  | 22   | 25           14.28 >-----------< 198     ------------------------< 286                   37.0                 4.0  | 100  | 0.62                                         Ca 9.3   Mg 2.0   Ph 3.6      MICROBIOLOGY:    RADIOLOGY & ADDITIONAL TESTS:    Imaging Personally Reviewed:  < from: CT Chest No Cont (09.19.17 @ 14:35) >  CHEST:     LUNGS AND LARGE AIRWAYS: Debris in the trachea, otherwise patent central   airways. Diffuse bilateral bronchial wall thickening. Bilateral upper   lobe patchy nodular opacities. Additional tree-in-bud opacities are seen   in the left upper lobe and both lower lobes. 1.8 cm cavitary lesion in   the right upper lobe.  PLEURA: No pleural effusion.  VESSELS: Within normal limits.  HEART: Heart size is normal. No pericardial effusion.  MEDIASTINUM AND STEPHANIE: No lymphadenopathy.  CHEST WALL AND LOWER NECK: Within normal limits.  VISUALIZED UPPER ABDOMEN: Pneumobilia.  BONES: Within normal limits.    IMPRESSION: Diffuse bilateral bronchial wall thickening with bilateral   upper lobe upper lobe nodular opacities. Additional bilateral tree-in-bud   opacities. Findings are consistent with infection.    [ ] Consultant(s) Notes Reviewed:  [ ] Care Discussed with Consultants/Other Providers:

## 2017-09-22 LAB
BASOPHILS # BLD AUTO: 0.04 K/UL — SIGNIFICANT CHANGE UP (ref 0–0.2)
BASOPHILS NFR BLD AUTO: 0.3 % — SIGNIFICANT CHANGE UP (ref 0–2)
BUN SERPL-MCNC: 20 MG/DL — SIGNIFICANT CHANGE UP (ref 7–23)
CALCIUM SERPL-MCNC: 9 MG/DL — SIGNIFICANT CHANGE UP (ref 8.4–10.5)
CHLORIDE SERPL-SCNC: 104 MMOL/L — SIGNIFICANT CHANGE UP (ref 98–107)
CO2 SERPL-SCNC: 24 MMOL/L — SIGNIFICANT CHANGE UP (ref 22–31)
CREAT SERPL-MCNC: 0.56 MG/DL — SIGNIFICANT CHANGE UP (ref 0.5–1.3)
EOSINOPHIL # BLD AUTO: 0.01 K/UL — SIGNIFICANT CHANGE UP (ref 0–0.5)
EOSINOPHIL NFR BLD AUTO: 0.1 % — SIGNIFICANT CHANGE UP (ref 0–6)
GLUCOSE SERPL-MCNC: 191 MG/DL — HIGH (ref 70–99)
HCT VFR BLD CALC: 38.6 % — SIGNIFICANT CHANGE UP (ref 34.5–45)
HGB BLD-MCNC: 12.6 G/DL — SIGNIFICANT CHANGE UP (ref 11.5–15.5)
IMM GRANULOCYTES # BLD AUTO: 0.26 # — SIGNIFICANT CHANGE UP
IMM GRANULOCYTES NFR BLD AUTO: 2 % — HIGH (ref 0–1.5)
LYMPHOCYTES # BLD AUTO: 17.2 % — SIGNIFICANT CHANGE UP (ref 13–44)
LYMPHOCYTES # BLD AUTO: 2.28 K/UL — SIGNIFICANT CHANGE UP (ref 1–3.3)
M TB TUBERC IFN-G BLD QL: 0 IU/ML — SIGNIFICANT CHANGE UP
M TB TUBERC IFN-G BLD QL: 0.01 IU/ML — SIGNIFICANT CHANGE UP
M TB TUBERC IFN-G BLD QL: SIGNIFICANT CHANGE UP
MAGNESIUM SERPL-MCNC: 1.9 MG/DL — SIGNIFICANT CHANGE UP (ref 1.6–2.6)
MCHC RBC-ENTMCNC: 26.6 PG — LOW (ref 27–34)
MCHC RBC-ENTMCNC: 32.6 % — SIGNIFICANT CHANGE UP (ref 32–36)
MCV RBC AUTO: 81.4 FL — SIGNIFICANT CHANGE UP (ref 80–100)
MITOGEN IGNF BCKGRD COR BLD-ACNC: 0.39 IU/ML — SIGNIFICANT CHANGE UP
MONOCYTES # BLD AUTO: 0.84 K/UL — SIGNIFICANT CHANGE UP (ref 0–0.9)
MONOCYTES NFR BLD AUTO: 6.3 % — SIGNIFICANT CHANGE UP (ref 2–14)
NEUTROPHILS # BLD AUTO: 9.81 K/UL — HIGH (ref 1.8–7.4)
NEUTROPHILS NFR BLD AUTO: 74.1 % — SIGNIFICANT CHANGE UP (ref 43–77)
NRBC # FLD: 0 — SIGNIFICANT CHANGE UP
PHOSPHATE SERPL-MCNC: 3.6 MG/DL — SIGNIFICANT CHANGE UP (ref 2.5–4.5)
PLATELET # BLD AUTO: 200 K/UL — SIGNIFICANT CHANGE UP (ref 150–400)
PMV BLD: 11.1 FL — SIGNIFICANT CHANGE UP (ref 7–13)
POTASSIUM SERPL-MCNC: 4 MMOL/L — SIGNIFICANT CHANGE UP (ref 3.5–5.3)
POTASSIUM SERPL-SCNC: 4 MMOL/L — SIGNIFICANT CHANGE UP (ref 3.5–5.3)
RBC # BLD: 4.74 M/UL — SIGNIFICANT CHANGE UP (ref 3.8–5.2)
RBC # FLD: 14.9 % — HIGH (ref 10.3–14.5)
SODIUM SERPL-SCNC: 142 MMOL/L — SIGNIFICANT CHANGE UP (ref 135–145)
WBC # BLD: 13.24 K/UL — HIGH (ref 3.8–10.5)
WBC # FLD AUTO: 13.24 K/UL — HIGH (ref 3.8–10.5)

## 2017-09-22 PROCEDURE — 99232 SBSQ HOSP IP/OBS MODERATE 35: CPT

## 2017-09-22 PROCEDURE — 99233 SBSQ HOSP IP/OBS HIGH 50: CPT | Mod: GC

## 2017-09-22 PROCEDURE — 99233 SBSQ HOSP IP/OBS HIGH 50: CPT

## 2017-09-22 RX ORDER — INSULIN GLARGINE 100 [IU]/ML
12 INJECTION, SOLUTION SUBCUTANEOUS AT BEDTIME
Qty: 0 | Refills: 0 | Status: DISCONTINUED | OUTPATIENT
Start: 2017-09-22 | End: 2017-09-23

## 2017-09-22 RX ORDER — CLOTRIMAZOLE 10 MG
1 TROCHE MUCOUS MEMBRANE
Qty: 0 | Refills: 0 | Status: DISCONTINUED | OUTPATIENT
Start: 2017-09-22 | End: 2017-09-29

## 2017-09-22 RX ORDER — IPRATROPIUM/ALBUTEROL SULFATE 18-103MCG
3 AEROSOL WITH ADAPTER (GRAM) INHALATION EVERY 6 HOURS
Qty: 0 | Refills: 0 | Status: DISCONTINUED | OUTPATIENT
Start: 2017-09-22 | End: 2017-09-27

## 2017-09-22 RX ORDER — INSULIN LISPRO 100/ML
10 VIAL (ML) SUBCUTANEOUS
Qty: 0 | Refills: 0 | Status: DISCONTINUED | OUTPATIENT
Start: 2017-09-22 | End: 2017-09-23

## 2017-09-22 RX ADMIN — Medication 7 UNIT(S): at 08:40

## 2017-09-22 RX ADMIN — Medication: at 12:48

## 2017-09-22 RX ADMIN — BUDESONIDE AND FORMOTEROL FUMARATE DIHYDRATE 2 PUFF(S): 160; 4.5 AEROSOL RESPIRATORY (INHALATION) at 21:13

## 2017-09-22 RX ADMIN — Medication 40 MILLIGRAM(S): at 17:41

## 2017-09-22 RX ADMIN — Medication 1 LOZENGE: at 21:13

## 2017-09-22 RX ADMIN — Medication 6: at 08:40

## 2017-09-22 RX ADMIN — Medication 4: at 17:39

## 2017-09-22 RX ADMIN — Medication 3 MILLILITER(S): at 15:01

## 2017-09-22 RX ADMIN — MONTELUKAST 10 MILLIGRAM(S): 4 TABLET, CHEWABLE ORAL at 21:13

## 2017-09-22 RX ADMIN — CEFTRIAXONE 100 GRAM(S): 500 INJECTION, POWDER, FOR SOLUTION INTRAMUSCULAR; INTRAVENOUS at 15:07

## 2017-09-22 RX ADMIN — AZITHROMYCIN 250 MILLIGRAM(S): 500 TABLET, FILM COATED ORAL at 12:50

## 2017-09-22 RX ADMIN — Medication 4: at 22:31

## 2017-09-22 RX ADMIN — Medication 1 LOZENGE: at 17:03

## 2017-09-22 RX ADMIN — Medication 10 UNIT(S): at 12:48

## 2017-09-22 RX ADMIN — Medication 40 MILLIGRAM(S): at 05:02

## 2017-09-22 RX ADMIN — BUDESONIDE AND FORMOTEROL FUMARATE DIHYDRATE 2 PUFF(S): 160; 4.5 AEROSOL RESPIRATORY (INHALATION) at 08:09

## 2017-09-22 RX ADMIN — HEPARIN SODIUM 5000 UNIT(S): 5000 INJECTION INTRAVENOUS; SUBCUTANEOUS at 17:41

## 2017-09-22 RX ADMIN — INSULIN GLARGINE 12 UNIT(S): 100 INJECTION, SOLUTION SUBCUTANEOUS at 21:13

## 2017-09-22 RX ADMIN — Medication 162 MILLIGRAM(S): at 12:50

## 2017-09-22 RX ADMIN — HEPARIN SODIUM 5000 UNIT(S): 5000 INJECTION INTRAVENOUS; SUBCUTANEOUS at 05:02

## 2017-09-22 RX ADMIN — Medication 10 UNIT(S): at 17:39

## 2017-09-22 NOTE — PROGRESS NOTE ADULT - PROBLEM SELECTOR PLAN 2
-fungal infection (?aspergillosis) vs. TB vs. bronchiectasis, may not be new  -ruled out for TB with negative AFB x4 but samples sent in short durations  -indeterminate quantiferon gold  -f/u ID recs, on airborne isolation until definitely ruled out for TB  -f/u pulm and ID

## 2017-09-22 NOTE — PROGRESS NOTE ADULT - PROBLEM SELECTOR PLAN 1
-still wheezing, change duoneb to q6h atc, consider pulmicort   -bronchiectasis/asthma exacerbation in the setting of RSV infection  -serum immunoglobulin levels normal  -pending aspergillosis lab  -HIV negative  -sputum cx (9/21): GPC in chains and GNR; cont ceftriaxone/zithromax for pneumonia coverage  -cont solumedrol 40 mg bid, eventual taper to prednisone as per pulmonary  -c/w bronchodilator duoneb q6h, singulair hs  -cont chest PT

## 2017-09-22 NOTE — PROGRESS NOTE ADULT - PROBLEM SELECTOR PLAN 3
-endo f/u appreciated  -uncontrolled DM-2 with A1c 10.8%  -insulin regimen per endo, adjusted to lantus 12 u hs and humalog 10 u ac, monitor FS

## 2017-09-22 NOTE — PROGRESS NOTE ADULT - SUBJECTIVE AND OBJECTIVE BOX
Follow Up:      Interval History/ROS:    Allergies  No Known Allergies        ANTIMICROBIALS:  cefTRIAXone   IVPB    azithromycin   Tablet 250 daily  cefTRIAXone   IVPB 1 every 24 hours      OTHER MEDS:  MEDICATIONS  (STANDING):  aspirin  chewable 162 daily  heparin  Injectable 5000 every 12 hours  insulin lispro (HumaLOG) corrective regimen sliding scale  at bedtime  dextrose Gel 1 once PRN  dextrose 50% Injectable 12.5 once  dextrose 50% Injectable 25 once  dextrose 50% Injectable 25 once  glucagon  Injectable 1 once PRN  ALBUTerol    90 MICROgram(s) HFA Inhaler 2 every 6 hours PRN  dextrose Gel 1 once PRN  dextrose 50% Injectable 25 once  glucagon  Injectable 1 once PRN  ALBUTerol/ipratropium for Nebulization 3 every 6 hours PRN  buDESOnide 160 MICROgram(s)/formoterol 4.5 MICROgram(s) Inhaler 2 two times a day  montelukast 10 at bedtime  methylPREDNISolone sodium succinate Injectable 40 every 12 hours  insulin glargine Injectable (LANTUS) 10 at bedtime  insulin lispro Injectable (HumaLOG) 7 three times a day before meals  insulin lispro (HumaLOG) corrective regimen sliding scale  three times a day before meals      Vital Signs Last 24 Hrs  T(C): 36.4 (22 Sep 2017 05:00), Max: 36.7 (21 Sep 2017 22:28)  T(F): 97.6 (22 Sep 2017 05:00), Max: 98.1 (21 Sep 2017 22:28)  HR: 64 (22 Sep 2017 05:00) (64 - 80)  BP: 138/81 (22 Sep 2017 05:00) (122/68 - 138/81)  BP(mean): --  RR: 18 (22 Sep 2017 05:00) (16 - 18)  SpO2: 98% (22 Sep 2017 05:00) (97% - 99%)    PHYSICAL EXAM:  General: WN/WD NAD, Non-toxic  Neurology: A&Ox3, nonfocal  Respiratory: Clear to auscultation bilaterally  CV: RRR, S1S2, no murmurs, rubs or gallops  Abdominal: Soft, Non-tender, non-distended, normal bowel sounds  Extremities: No edema, + peripheral pulses  Line Sites: Clear  Skin: No rash                          12.6   13.24 )-----------( 200      ( 22 Sep 2017 05:33 )             38.6       09-22    142  |  104  |  20  ----------------------------<  191<H>  4.0   |  24  |  0.56    Ca    9.0      22 Sep 2017 05:33  Phos  3.6     09-22  Mg     1.9     09-22    MICROBIOLOGY:    AFB negative x4  Quant Gold indeterminate.  Pending sputum culture, prelim growth of g+ cocci in chains (strep?) and gnr.    RADIOLOGY:    < from: CT Chest No Cont (09.19.17 @ 14:35) >  LUNGS AND LARGE AIRWAYS: Debris in the trachea, otherwise patent central   airways. Diffuse bilateral bronchial wall thickening. Bilateral upper   lobe patchy nodular opacities. Additional tree-in-bud opacities are seen   in the left upper lobe and both lower lobes. 1.8 cm cavitary lesion in   the right upper lobe.  PLEURA: No pleural effusion.  VESSELS: Within normal limits.  HEART: Heart size is normal. No pericardial effusion.  MEDIASTINUM AND STEPHANIE: No lymphadenopathy.  CHEST WALL AND LOWER NECK: Within normal limits.  VISUALIZED UPPER ABDOMEN: Pneumobilia.  BONES: Within normal limits.    IMPRESSION: Diffuse bilateral bronchial wall thickening with bilateral   upper lobe upper lobe nodular opacities. Additional bilateral tree-in-bud   opacities. Findings are consistent with infection.    < end of copied text > Interval History/ROS:    Interval negative x4 AFB smear, indeterminate quant gold. No overnight events.    Patient at bedside, still with productive cough with thick purulent sputum, estimates 10% improvement from admission. Made patient aware (through daughter whom is translating) that she will stay over the weekend, and will continue to monitor for improvement.     ROS + sore throat, pleuritic chest pain, palpitations, cough with productive sputum, abdominal pain and reflux with meals, weight loss.  ROS negative hemoptysis. diarrhea, hematochezia, melena, urinary symptoms, abdominal pain.    VSS, still saturating well on RA    Allergies  No Known Allergies        ANTIMICROBIALS:  cefTRIAXone   IVPB    azithromycin   Tablet 250 daily  cefTRIAXone   IVPB 1 every 24 hours      OTHER MEDS:  MEDICATIONS  (STANDING):  aspirin  chewable 162 daily  heparin  Injectable 5000 every 12 hours  insulin lispro (HumaLOG) corrective regimen sliding scale  at bedtime  dextrose Gel 1 once PRN  dextrose 50% Injectable 12.5 once  dextrose 50% Injectable 25 once  dextrose 50% Injectable 25 once  glucagon  Injectable 1 once PRN  ALBUTerol    90 MICROgram(s) HFA Inhaler 2 every 6 hours PRN  dextrose Gel 1 once PRN  dextrose 50% Injectable 25 once  glucagon  Injectable 1 once PRN  ALBUTerol/ipratropium for Nebulization 3 every 6 hours PRN  buDESOnide 160 MICROgram(s)/formoterol 4.5 MICROgram(s) Inhaler 2 two times a day  montelukast 10 at bedtime  methylPREDNISolone sodium succinate Injectable 40 every 12 hours  insulin glargine Injectable (LANTUS) 10 at bedtime  insulin lispro Injectable (HumaLOG) 7 three times a day before meals  insulin lispro (HumaLOG) corrective regimen sliding scale  three times a day before meals      Vital Signs Last 24 Hrs  T(C): 36.4 (22 Sep 2017 05:00), Max: 36.7 (21 Sep 2017 22:28)  T(F): 97.6 (22 Sep 2017 05:00), Max: 98.1 (21 Sep 2017 22:28)  HR: 64 (22 Sep 2017 05:00) (64 - 80)  BP: 138/81 (22 Sep 2017 05:00) (122/68 - 138/81)  BP(mean): --  RR: 18 (22 Sep 2017 05:00) (16 - 18)  SpO2: 98% (22 Sep 2017 05:00) (97% - 99%)    PHYSICAL EXAM:  General: NAD, cachectic.  HEAD/EYES: anicteric, PERRL  ENT:  supple  Cardiovascular:   S1, S2  Respiratory:  Coarse b/l rales, stridor that clears with cough. Inspiratory/expiratory wheezing.   GI:  soft, non-tender, normal bowel sounds  :  no CVA tenderness   Musculoskeletal:  no synovitis  Neurologic:  grossly non-focal  Skin:  no rash  Lymph: no lymphadenopathy  Psychiatric:  appropriate affect  Vascular:  no phlebitis                          12.6   13.24 )-----------( 200      ( 22 Sep 2017 05:33 )             38.6       09-22    142  |  104  |  20  ----------------------------<  191<H>  4.0   |  24  |  0.56    Ca    9.0      22 Sep 2017 05:33  Phos  3.6     09-22  Mg     1.9     09-22    MICROBIOLOGY:    AFB negative x4  Quant Gold indeterminate.  Pending sputum culture, prelim growth of g+ cocci in chains (strep?) and gnr.    RADIOLOGY:    < from: CT Chest No Cont (09.19.17 @ 14:35) >  LUNGS AND LARGE AIRWAYS: Debris in the trachea, otherwise patent central   airways. Diffuse bilateral bronchial wall thickening. Bilateral upper   lobe patchy nodular opacities. Additional tree-in-bud opacities are seen   in the left upper lobe and both lower lobes. 1.8 cm cavitary lesion in   the right upper lobe.  PLEURA: No pleural effusion.  VESSELS: Within normal limits.  HEART: Heart size is normal. No pericardial effusion.  MEDIASTINUM AND STEPHANIE: No lymphadenopathy.  CHEST WALL AND LOWER NECK: Within normal limits.  VISUALIZED UPPER ABDOMEN: Pneumobilia.  BONES: Within normal limits.    IMPRESSION: Diffuse bilateral bronchial wall thickening with bilateral   upper lobe upper lobe nodular opacities. Additional bilateral tree-in-bud   opacities. Findings are consistent with infection.    < end of copied text >

## 2017-09-22 NOTE — PROGRESS NOTE ADULT - SUBJECTIVE AND OBJECTIVE BOX
CHIEF COMPLAINT: Patient is a 62y old  female who presents with a chief complaint of fluctuating sugars (20 Sep 2017 12:22)      SUBJECTIVE / OVERNIGHT EVENTS:  Pt breathing a little better, but still wheezing    MEDICATIONS  (STANDING):  aspirin  chewable 162 milliGRAM(s) Oral daily  heparin  Injectable 5000 Unit(s) SubCutaneous every 12 hours  insulin lispro (HumaLOG) corrective regimen sliding scale   SubCutaneous at bedtime  dextrose 5%. 1000 milliLiter(s) (50 mL/Hr) IV Continuous <Continuous>  dextrose 50% Injectable 12.5 Gram(s) IV Push once  dextrose 50% Injectable 25 Gram(s) IV Push once  dextrose 50% Injectable 25 Gram(s) IV Push once  dextrose 5%. 1000 milliLiter(s) (50 mL/Hr) IV Continuous <Continuous>  dextrose 50% Injectable 25 Gram(s) IV Push once  buDESOnide 160 MICROgram(s)/formoterol 4.5 MICROgram(s) Inhaler 2 Puff(s) Inhalation two times a day  montelukast 10 milliGRAM(s) Oral at bedtime  methylPREDNISolone sodium succinate Injectable 40 milliGRAM(s) IV Push every 12 hours  cefTRIAXone   IVPB      azithromycin   Tablet 250 milliGRAM(s) Oral daily  cefTRIAXone   IVPB 1 Gram(s) IV Intermittent every 24 hours  insulin lispro (HumaLOG) corrective regimen sliding scale   SubCutaneous three times a day before meals  insulin glargine Injectable (LANTUS) 12 Unit(s) SubCutaneous at bedtime  insulin lispro Injectable (HumaLOG) 10 Unit(s) SubCutaneous three times a day before meals  clotrimazole Lozenge 1 Lozenge Oral five times a day  ALBUTerol/ipratropium for Nebulization 3 milliLiter(s) Nebulizer every 6 hours    MEDICATIONS  (PRN):  dextrose Gel 1 Dose(s) Oral once PRN Blood Glucose LESS THAN 70 milliGRAM(s)/deciliter  glucagon  Injectable 1 milliGRAM(s) IntraMuscular once PRN Glucose LESS THAN 70 milligrams/deciliter  ALBUTerol    90 MICROgram(s) HFA Inhaler 2 Puff(s) Inhalation every 6 hours PRN Shortness of Breath and/or Wheezing  dextrose Gel 1 Dose(s) Oral once PRN Blood Glucose LESS THAN 70 milliGRAM(s)/deciliter  glucagon  Injectable 1 milliGRAM(s) IntraMuscular once PRN Glucose LESS THAN 70 milligrams/deciliter      VITALS:  T(F): 97.6 (09-22-17 @ 05:00), Max: 98.1 (09-21-17 @ 22:28)  HR: 64 (09-22-17 @ 05:00) (64 - 80)  BP: 138/81 (09-22-17 @ 05:00) (122/68 - 138/81)  RR: 18 (09-22-17 @ 05:00) (16 - 18)  SpO2: 98% (09-22-17 @ 05:00)      CAPILLARY BLOOD GLUCOSE  372 (09-22 @ 12:25)  273 (09-22 @ 08:31)  295 (09-21 @ 22:28)  312 (09-21 @ 16:30)      PHYSICAL EXAM:  GENERAL: NAD, well-developed  HEAD:  Atraumatic, Normocephalic  EYES: EOMI, PERRLA, conjunctiva and sclera clear  NECK: Supple, No JVD  CHEST/LUNG: diffuse wheezing, and decreased air entry  HEART: Regular rate and rhythm; No murmurs, rubs, or gallops  ABDOMEN: Soft, Nontender, Nondistended; Bowel sounds present  EXTREMITIES:  2+ Peripheral Pulses, No clubbing, cyanosis, or edema  PSYCH: AAOx3  NEUROLOGY: non-focal  SKIN: No rashes or lesions    LABS:              12.6                 142  | 24   | 20           13.24 >-----------< 200     ------------------------< 191                   38.6                 4.0  | 104  | 0.56                                         Ca 9.0   Mg 1.9   Ph 3.6          MICROBIOLOGY:          RADIOLOGY & ADDITIONAL TESTS:    Imaging Personally Reviewed:    Imaging Personally Reviewed:  < from: CT Chest No Cont (09.19.17 @ 14:35) >  CHEST:     LUNGS AND LARGE AIRWAYS: Debris in the trachea, otherwise patent central   airways. Diffuse bilateral bronchial wall thickening. Bilateral upper   lobe patchy nodular opacities. Additional tree-in-bud opacities are seen   in the left upper lobe and both lower lobes. 1.8 cm cavitary lesion in   the right upper lobe.  PLEURA: No pleural effusion.  VESSELS: Within normal limits.  HEART: Heart size is normal. No pericardial effusion.  MEDIASTINUM AND STEPHANIE: No lymphadenopathy.  CHEST WALL AND LOWER NECK: Within normal limits.  VISUALIZED UPPER ABDOMEN: Pneumobilia.  BONES: Within normal limits.    IMPRESSION: Diffuse bilateral bronchial wall thickening with bilateral   upper lobe upper lobe nodular opacities. Additional bilateral tree-in-bud   opacities. Findings are consistent with infection.    [ ] Consultant(s) Notes Reviewed:  [ ] Care Discussed with Consultants/Other Providers:

## 2017-09-22 NOTE — PROGRESS NOTE ADULT - SUBJECTIVE AND OBJECTIVE BOX
Chief Complaint: DM 2 exacerbated by steroids    History: Prandial hyperglycemia continues despite insulin increase yesterday, IV steroids continue at same dose as yesterday (40 mg).   and patient is tolerating PO and ate all of breakfast     MEDICATIONS  (STANDING):  aspirin  chewable 162 milliGRAM(s) Oral daily  heparin  Injectable 5000 Unit(s) SubCutaneous every 12 hours  insulin lispro (HumaLOG) corrective regimen sliding scale   SubCutaneous at bedtime  dextrose 5%. 1000 milliLiter(s) (50 mL/Hr) IV Continuous <Continuous>  dextrose 50% Injectable 12.5 Gram(s) IV Push once  dextrose 50% Injectable 25 Gram(s) IV Push once  dextrose 50% Injectable 25 Gram(s) IV Push once  dextrose 5%. 1000 milliLiter(s) (50 mL/Hr) IV Continuous <Continuous>  dextrose 50% Injectable 25 Gram(s) IV Push once  buDESOnide 160 MICROgram(s)/formoterol 4.5 MICROgram(s) Inhaler 2 Puff(s) Inhalation two times a day  montelukast 10 milliGRAM(s) Oral at bedtime  methylPREDNISolone sodium succinate Injectable 40 milliGRAM(s) IV Push every 12 hours  cefTRIAXone   IVPB      azithromycin   Tablet 250 milliGRAM(s) Oral daily  cefTRIAXone   IVPB 1 Gram(s) IV Intermittent every 24 hours  insulin glargine Injectable (LANTUS) 10 Unit(s) SubCutaneous at bedtime  insulin lispro Injectable (HumaLOG) 7 Unit(s) SubCutaneous three times a day before meals  insulin lispro (HumaLOG) corrective regimen sliding scale   SubCutaneous three times a day before meals    MEDICATIONS  (PRN):  dextrose Gel 1 Dose(s) Oral once PRN Blood Glucose LESS THAN 70 milliGRAM(s)/deciliter  glucagon  Injectable 1 milliGRAM(s) IntraMuscular once PRN Glucose LESS THAN 70 milligrams/deciliter  ALBUTerol    90 MICROgram(s) HFA Inhaler 2 Puff(s) Inhalation every 6 hours PRN Shortness of Breath and/or Wheezing  dextrose Gel 1 Dose(s) Oral once PRN Blood Glucose LESS THAN 70 milliGRAM(s)/deciliter  glucagon  Injectable 1 milliGRAM(s) IntraMuscular once PRN Glucose LESS THAN 70 milligrams/deciliter  ALBUTerol/ipratropium for Nebulization 3 milliLiter(s) Nebulizer every 6 hours PRN Wheezing      No Known Allergies      Review of Systems:  Constitutional: No fever  Cardiovascular: No chest pain, palpitations  Respiratory: No SOB, no cough  GI: No nausea, vomiting, abdominal pain  Endocrine: no polyuria, polydipsia        PHYSICAL EXAM:  VITALS: T(C): 36.4 (09-22-17 @ 05:00)  T(F): 97.6 (09-22-17 @ 05:00), Max: 98.1 (09-21-17 @ 22:28)  HR: 64 (09-22-17 @ 05:00) (64 - 80)  BP: 138/81 (09-22-17 @ 05:00) (122/68 - 138/81)  RR:  (16 - 18)  SpO2:  (97% - 99%)  Wt(kg): --  GENERAL: NAD  RESPIRATORY: non labored, not coughing at this time  GI: Soft, nontender, non distended  SKIN: Dry, warm  PSYCH: Alert and oriented x 3, normal affect, normal mood    CAPILLARY BLOOD GLUCOSE  273 (09-22 @ 08:31)  295 (09-21 @ 22:28)  312 (09-21 @ 16:30)  274 (09-21 @ 12:12)  188 (09-21 @ 08:42)  237 (09-20 @ 21:01)  261 (09-20 @ 17:14)  409 (09-20 @ 12:27)  190 (09-20 @ 08:47)  367 (09-19 @ 21:57)  254 (09-19 @ 17:06)  239 (09-19 @ 11:55)      09-22    142  |  104  |  20  ----------------------------<  191<H>  4.0   |  24  |  0.56    EGFR if : 117  EGFR if non : 101    Ca    9.0      09-22  Mg     1.9     09-22  Phos  3.6     09-22            Thyroid Function Tests:  09-19 @ 06:00 TSH 1.93 FreeT4 -- T3 -- Anti TPO -- Anti Thyroglobulin Ab -- TSI --      Hemoglobin A1C, Whole Blood: 10.8 % <H> [4.0 - 5.6] (09-18-17 @ 11:50)

## 2017-09-22 NOTE — PROGRESS NOTE ADULT - ATTENDING COMMENTS
62 year old with history of bronchiectasis presents with acute exacerbation/ cough.    this may be due to rsv    Pulmonary has concern for superiposed bacterial process.  I would stop azithromycin at 5 days.  Continue ceftriaxone. If stable for d/c,change to ceftin 500 q 12 through 9/29    Will need serial CT chest to monitor bronchiectasis and follow up small cavitary lung lesion.    Call with questions

## 2017-09-22 NOTE — PROGRESS NOTE ADULT - PROBLEM SELECTOR PLAN 1
target bg 100-180 mg/dl  above goal   -increased lantus increase to 12 units sq qhs  -increased humalog to 10 units sq tid ac  -c/w correctional scales from low to moderate  -monitor steroids for taper, insulin may need to be adjusted if steroids decrease to prevent hypoglycemia     DC plan: spoke with pharmacy liaison who confirmed patient has [facility] insurance coverage. Anticipate Basal (Basaglar Kwikpen 10 units) plus metformin 1000 mg BID for discharge.

## 2017-09-22 NOTE — PROGRESS NOTE ADULT - SUBJECTIVE AND OBJECTIVE BOX
CHIEF COMPLAINT:    Interval Events:    REVIEW OF SYSTEMS:  Constitutional: [ ] negative [ ] fevers [ ] chills [ ] weight loss [ ] weight gain  HEENT: [ ] negative [ ] dry eyes [ ] eye irritation [ ] postnasal drip [ ] nasal congestion  CV: [ ] negative  [ ] chest pain [ ] orthopnea [ ] palpitations [ ] murmur  Resp: [ ] negative [ ] cough [ ] shortness of breath [ ] dyspnea [ ] wheezing [ ] sputum [ ] hemoptysis  GI: [ ] negative [ ] nausea [ ] vomiting [ ] diarrhea [ ] constipation [ ] abd pain [ ] dysphagia   : [ ] negative [ ] dysuria [ ] nocturia [ ] hematuria [ ] increased urinary frequency  Musculoskeletal: [ ] negative [ ] back pain [ ] myalgias [ ] arthralgias [ ] fracture  Skin: [ ] negative [ ] rash [ ] itch  Neurological: [ ] negative [ ] headache [ ] dizziness [ ] syncope [ ] weakness [ ] numbness  Psychiatric: [ ] negative [ ] anxiety [ ] depression  Endocrine: [ ] negative [ ] diabetes [ ] thyroid problem  Hematologic/Lymphatic: [ ] negative [ ] anemia [ ] bleeding problem  Allergic/Immunologic: [ ] negative [ ] itchy eyes [ ] nasal discharge [ ] hives [ ] angioedema  [ ] All other systems negative  [ ] Unable to assess ROS because ________    OBJECTIVE:  ICU Vital Signs Last 24 Hrs  T(C): 36.4 (22 Sep 2017 05:00), Max: 36.7 (21 Sep 2017 22:28)  T(F): 97.6 (22 Sep 2017 05:00), Max: 98.1 (21 Sep 2017 22:28)  HR: 64 (22 Sep 2017 05:00) (64 - 80)  BP: 138/81 (22 Sep 2017 05:00) (122/68 - 138/81)  BP(mean): --  ABP: --  ABP(mean): --  RR: 18 (22 Sep 2017 05:00) (16 - 18)  SpO2: 98% (22 Sep 2017 05:00) (97% - 99%)        CAPILLARY BLOOD GLUCOSE  273 (22 Sep 2017 08:31)          PHYSICAL EXAM:  General:   HEENT:   Lymph Nodes:  Neck:   Respiratory:   Cardiovascular:   Abdomen:   Extremities:   Skin:   Neurological:  Psychiatry:    HOSPITAL MEDICATIONS:  Standing Meds:  aspirin  chewable 162 milliGRAM(s) Oral daily  heparin  Injectable 5000 Unit(s) SubCutaneous every 12 hours  insulin lispro (HumaLOG) corrective regimen sliding scale   SubCutaneous at bedtime  dextrose 5%. 1000 milliLiter(s) IV Continuous <Continuous>  dextrose 50% Injectable 12.5 Gram(s) IV Push once  dextrose 50% Injectable 25 Gram(s) IV Push once  dextrose 50% Injectable 25 Gram(s) IV Push once  dextrose 5%. 1000 milliLiter(s) IV Continuous <Continuous>  dextrose 50% Injectable 25 Gram(s) IV Push once  buDESOnide 160 MICROgram(s)/formoterol 4.5 MICROgram(s) Inhaler 2 Puff(s) Inhalation two times a day  montelukast 10 milliGRAM(s) Oral at bedtime  methylPREDNISolone sodium succinate Injectable 40 milliGRAM(s) IV Push every 12 hours  cefTRIAXone   IVPB      azithromycin   Tablet 250 milliGRAM(s) Oral daily  cefTRIAXone   IVPB 1 Gram(s) IV Intermittent every 24 hours  insulin lispro (HumaLOG) corrective regimen sliding scale   SubCutaneous three times a day before meals  insulin glargine Injectable (LANTUS) 12 Unit(s) SubCutaneous at bedtime  insulin lispro Injectable (HumaLOG) 10 Unit(s) SubCutaneous three times a day before meals      PRN Meds:  dextrose Gel 1 Dose(s) Oral once PRN  glucagon  Injectable 1 milliGRAM(s) IntraMuscular once PRN  ALBUTerol    90 MICROgram(s) HFA Inhaler 2 Puff(s) Inhalation every 6 hours PRN  dextrose Gel 1 Dose(s) Oral once PRN  glucagon  Injectable 1 milliGRAM(s) IntraMuscular once PRN  ALBUTerol/ipratropium for Nebulization 3 milliLiter(s) Nebulizer every 6 hours PRN      LABS:                        12.6   13.24 )-----------( 200      ( 22 Sep 2017 05:33 )             38.6     Hgb Trend: 12.6<--, 11.9<--, 13.3<--, 11.7<--, 12.5<--  09-22    142  |  104  |  20  ----------------------------<  191<H>  4.0   |  24  |  0.56    Ca    9.0      22 Sep 2017 05:33  Phos  3.6     09-22  Mg     1.9     09-22      Creatinine Trend: 0.56<--, 0.62<--, 0.58<--, 0.72<--, 0.61<--            MICROBIOLOGY:     RADIOLOGY:  [ ] Reviewed and interpreted by me    PULMONARY FUNCTION TESTS:    EKG: CHIEF COMPLAINT: Chest tight ness    Interval Events: none    REVIEW OF SYSTEMS: SOB, Cough, Wheezing improved, some nausea. No feveres chills night sweats.     [x] All other systems negative  [ ] Unable to assess ROS because ________    OBJECTIVE:  ICU Vital Signs Last 24 Hrs  T(C): 36.4 (22 Sep 2017 05:00), Max: 36.7 (21 Sep 2017 22:28)  T(F): 97.6 (22 Sep 2017 05:00), Max: 98.1 (21 Sep 2017 22:28)  HR: 64 (22 Sep 2017 05:00) (64 - 80)  BP: 138/81 (22 Sep 2017 05:00) (122/68 - 138/81)  BP(mean): --  ABP: --  ABP(mean): --  RR: 18 (22 Sep 2017 05:00) (16 - 18)  SpO2: 98% (22 Sep 2017 05:00) (97% - 99%)        CAPILLARY BLOOD GLUCOSE  273 (22 Sep 2017 08:31)          PHYSICAL EXAM:  GENERAL: NAD  HEENT:  Atraumatic, Normocephalic, moist mucous membranes  RESPIRATORY: Poor air entry and b/l upper lobe epiratory ronchi. Clear to auscultation bilaterally; No rales, rhonchi, wheezing, or rubs  CARDIOVASCULAR: Regular rate and rhythm; No murmurs; no peripheral edema  GI: Soft, nontender, non distended, normal bowel sounds  SKIN: Dry, intact, No rashes or lesions  NEURO: no deficits.  PSYCH: Alert and oriented x 3, normal affect, normal mood    HOSPITAL MEDICATIONS:  Standing Meds:  aspirin  chewable 162 milliGRAM(s) Oral daily  heparin  Injectable 5000 Unit(s) SubCutaneous every 12 hours  insulin lispro (HumaLOG) corrective regimen sliding scale   SubCutaneous at bedtime  dextrose 5%. 1000 milliLiter(s) IV Continuous <Continuous>  dextrose 50% Injectable 12.5 Gram(s) IV Push once  dextrose 50% Injectable 25 Gram(s) IV Push once  dextrose 50% Injectable 25 Gram(s) IV Push once  dextrose 5%. 1000 milliLiter(s) IV Continuous <Continuous>  dextrose 50% Injectable 25 Gram(s) IV Push once  buDESOnide 160 MICROgram(s)/formoterol 4.5 MICROgram(s) Inhaler 2 Puff(s) Inhalation two times a day  montelukast 10 milliGRAM(s) Oral at bedtime  methylPREDNISolone sodium succinate Injectable 40 milliGRAM(s) IV Push every 12 hours  cefTRIAXone   IVPB      azithromycin   Tablet 250 milliGRAM(s) Oral daily  cefTRIAXone   IVPB 1 Gram(s) IV Intermittent every 24 hours  insulin lispro (HumaLOG) corrective regimen sliding scale   SubCutaneous three times a day before meals  insulin glargine Injectable (LANTUS) 12 Unit(s) SubCutaneous at bedtime  insulin lispro Injectable (HumaLOG) 10 Unit(s) SubCutaneous three times a day before meals      PRN Meds:  dextrose Gel 1 Dose(s) Oral once PRN  glucagon  Injectable 1 milliGRAM(s) IntraMuscular once PRN  ALBUTerol    90 MICROgram(s) HFA Inhaler 2 Puff(s) Inhalation every 6 hours PRN  dextrose Gel 1 Dose(s) Oral once PRN  glucagon  Injectable 1 milliGRAM(s) IntraMuscular once PRN  ALBUTerol/ipratropium for Nebulization 3 milliLiter(s) Nebulizer every 6 hours PRN      LABS:                        12.6   13.24 )-----------( 200      ( 22 Sep 2017 05:33 )             38.6     Hgb Trend: 12.6<--, 11.9<--, 13.3<--, 11.7<--, 12.5<--  09-22    142  |  104  |  20  ----------------------------<  191<H>  4.0   |  24  |  0.56    Ca    9.0      22 Sep 2017 05:33  Phos  3.6     09-22  Mg     1.9     09-22      Creatinine Trend: 0.56<--, 0.62<--, 0.58<--, 0.72<--, 0.61<--            MICROBIOLOGY:     RADIOLOGY:  [ ] Reviewed and interpreted by me    PULMONARY FUNCTION TESTS:    EKG:

## 2017-09-22 NOTE — PROGRESS NOTE ADULT - ATTENDING COMMENTS
This is a 62-year-old [place] female with a history of Asthma, Bronchiectasis, allergic bronchopulmonary aspergillosis (s/p Itraconazole 2015), on chronic steroids, and diabetes who presents with asthma exacerbation due to RSV, found on CT Chest to have diffuse bronchial wall thickening, mosaic attenuation, bilateral upper lobe patchy nodular opacities, tree-in-bud opacities in KELLY/LLL, and peripheral thin-walled 1.8 cm cyst in the RUL. Reviewed with radiology, does not appear to be a cavitary lesion. Sputum AFB negative x3, quant gold indeterminate, d/c airborne. Can taper steroids to oral prednisone tomorrow if remains stable, although still with wheezing on exam (likely chronic). RF and immunoglobulins are normal, sputum gram stain with GNR, ULeg negative, HIV negative. Can stop azithromycin after 3 days. Continue CTX. Continue Symbicort, Albuterol, Singulair. D/c planning, will need close outpatient pulmonary follow-up.

## 2017-09-23 LAB
BASOPHILS # BLD AUTO: 0.05 K/UL — SIGNIFICANT CHANGE UP (ref 0–0.2)
BASOPHILS NFR BLD AUTO: 0.4 % — SIGNIFICANT CHANGE UP (ref 0–2)
BUN SERPL-MCNC: 19 MG/DL — SIGNIFICANT CHANGE UP (ref 7–23)
CALCIUM SERPL-MCNC: 9.1 MG/DL — SIGNIFICANT CHANGE UP (ref 8.4–10.5)
CHLORIDE SERPL-SCNC: 100 MMOL/L — SIGNIFICANT CHANGE UP (ref 98–107)
CO2 SERPL-SCNC: 25 MMOL/L — SIGNIFICANT CHANGE UP (ref 22–31)
CREAT SERPL-MCNC: 0.58 MG/DL — SIGNIFICANT CHANGE UP (ref 0.5–1.3)
EOSINOPHIL # BLD AUTO: 0 K/UL — SIGNIFICANT CHANGE UP (ref 0–0.5)
EOSINOPHIL NFR BLD AUTO: 0 % — SIGNIFICANT CHANGE UP (ref 0–6)
GLUCOSE SERPL-MCNC: 331 MG/DL — HIGH (ref 70–99)
HCT VFR BLD CALC: 36.2 % — SIGNIFICANT CHANGE UP (ref 34.5–45)
HGB BLD-MCNC: 11.8 G/DL — SIGNIFICANT CHANGE UP (ref 11.5–15.5)
IMM GRANULOCYTES # BLD AUTO: 0.46 # — SIGNIFICANT CHANGE UP
IMM GRANULOCYTES NFR BLD AUTO: 3.5 % — HIGH (ref 0–1.5)
LYMPHOCYTES # BLD AUTO: 1.93 K/UL — SIGNIFICANT CHANGE UP (ref 1–3.3)
LYMPHOCYTES # BLD AUTO: 14.9 % — SIGNIFICANT CHANGE UP (ref 13–44)
MAGNESIUM SERPL-MCNC: 1.9 MG/DL — SIGNIFICANT CHANGE UP (ref 1.6–2.6)
MCHC RBC-ENTMCNC: 26.8 PG — LOW (ref 27–34)
MCHC RBC-ENTMCNC: 32.6 % — SIGNIFICANT CHANGE UP (ref 32–36)
MCV RBC AUTO: 82.1 FL — SIGNIFICANT CHANGE UP (ref 80–100)
MONOCYTES # BLD AUTO: 0.8 K/UL — SIGNIFICANT CHANGE UP (ref 0–0.9)
MONOCYTES NFR BLD AUTO: 6.2 % — SIGNIFICANT CHANGE UP (ref 2–14)
NEUTROPHILS # BLD AUTO: 9.75 K/UL — HIGH (ref 1.8–7.4)
NEUTROPHILS NFR BLD AUTO: 75 % — SIGNIFICANT CHANGE UP (ref 43–77)
NRBC # FLD: 0 — SIGNIFICANT CHANGE UP
PHOSPHATE SERPL-MCNC: 3.5 MG/DL — SIGNIFICANT CHANGE UP (ref 2.5–4.5)
PLATELET # BLD AUTO: 171 K/UL — SIGNIFICANT CHANGE UP (ref 150–400)
PMV BLD: 11.3 FL — SIGNIFICANT CHANGE UP (ref 7–13)
POTASSIUM SERPL-MCNC: 3.9 MMOL/L — SIGNIFICANT CHANGE UP (ref 3.5–5.3)
POTASSIUM SERPL-SCNC: 3.9 MMOL/L — SIGNIFICANT CHANGE UP (ref 3.5–5.3)
RBC # BLD: 4.41 M/UL — SIGNIFICANT CHANGE UP (ref 3.8–5.2)
RBC # FLD: 15.1 % — HIGH (ref 10.3–14.5)
SODIUM SERPL-SCNC: 139 MMOL/L — SIGNIFICANT CHANGE UP (ref 135–145)
WBC # BLD: 12.99 K/UL — HIGH (ref 3.8–10.5)
WBC # FLD AUTO: 12.99 K/UL — HIGH (ref 3.8–10.5)

## 2017-09-23 PROCEDURE — 99233 SBSQ HOSP IP/OBS HIGH 50: CPT

## 2017-09-23 RX ORDER — INSULIN LISPRO 100/ML
12 VIAL (ML) SUBCUTANEOUS
Qty: 0 | Refills: 0 | Status: DISCONTINUED | OUTPATIENT
Start: 2017-09-23 | End: 2017-09-23

## 2017-09-23 RX ORDER — INSULIN GLARGINE 100 [IU]/ML
15 INJECTION, SOLUTION SUBCUTANEOUS AT BEDTIME
Qty: 0 | Refills: 0 | Status: DISCONTINUED | OUTPATIENT
Start: 2017-09-23 | End: 2017-09-23

## 2017-09-23 RX ORDER — INSULIN GLARGINE 100 [IU]/ML
18 INJECTION, SOLUTION SUBCUTANEOUS AT BEDTIME
Qty: 0 | Refills: 0 | Status: DISCONTINUED | OUTPATIENT
Start: 2017-09-23 | End: 2017-09-25

## 2017-09-23 RX ORDER — INSULIN LISPRO 100/ML
14 VIAL (ML) SUBCUTANEOUS
Qty: 0 | Refills: 0 | Status: DISCONTINUED | OUTPATIENT
Start: 2017-09-23 | End: 2017-09-25

## 2017-09-23 RX ADMIN — Medication 6: at 12:42

## 2017-09-23 RX ADMIN — INSULIN GLARGINE 18 UNIT(S): 100 INJECTION, SOLUTION SUBCUTANEOUS at 21:39

## 2017-09-23 RX ADMIN — Medication 3 MILLILITER(S): at 17:21

## 2017-09-23 RX ADMIN — Medication 3 MILLILITER(S): at 00:05

## 2017-09-23 RX ADMIN — Medication 3 MILLILITER(S): at 22:31

## 2017-09-23 RX ADMIN — Medication 10 UNIT(S): at 12:43

## 2017-09-23 RX ADMIN — HEPARIN SODIUM 5000 UNIT(S): 5000 INJECTION INTRAVENOUS; SUBCUTANEOUS at 05:39

## 2017-09-23 RX ADMIN — Medication 40 MILLIGRAM(S): at 17:38

## 2017-09-23 RX ADMIN — Medication 1 LOZENGE: at 12:45

## 2017-09-23 RX ADMIN — Medication 1 LOZENGE: at 05:39

## 2017-09-23 RX ADMIN — Medication 3 MILLILITER(S): at 04:06

## 2017-09-23 RX ADMIN — Medication 3 MILLILITER(S): at 11:32

## 2017-09-23 RX ADMIN — CEFTRIAXONE 100 GRAM(S): 500 INJECTION, POWDER, FOR SOLUTION INTRAMUSCULAR; INTRAVENOUS at 15:58

## 2017-09-23 RX ADMIN — Medication 1 LOZENGE: at 08:52

## 2017-09-23 RX ADMIN — Medication 40 MILLIGRAM(S): at 05:39

## 2017-09-23 RX ADMIN — BUDESONIDE AND FORMOTEROL FUMARATE DIHYDRATE 2 PUFF(S): 160; 4.5 AEROSOL RESPIRATORY (INHALATION) at 08:51

## 2017-09-23 RX ADMIN — HEPARIN SODIUM 5000 UNIT(S): 5000 INJECTION INTRAVENOUS; SUBCUTANEOUS at 17:40

## 2017-09-23 RX ADMIN — MONTELUKAST 10 MILLIGRAM(S): 4 TABLET, CHEWABLE ORAL at 21:39

## 2017-09-23 RX ADMIN — AZITHROMYCIN 250 MILLIGRAM(S): 500 TABLET, FILM COATED ORAL at 12:45

## 2017-09-23 RX ADMIN — Medication 14 UNIT(S): at 17:38

## 2017-09-23 RX ADMIN — Medication 10 UNIT(S): at 08:49

## 2017-09-23 RX ADMIN — Medication 1 LOZENGE: at 20:27

## 2017-09-23 RX ADMIN — BUDESONIDE AND FORMOTEROL FUMARATE DIHYDRATE 2 PUFF(S): 160; 4.5 AEROSOL RESPIRATORY (INHALATION) at 20:27

## 2017-09-23 RX ADMIN — Medication 6: at 08:48

## 2017-09-23 RX ADMIN — Medication 1 LOZENGE: at 15:59

## 2017-09-23 RX ADMIN — Medication 162 MILLIGRAM(S): at 12:45

## 2017-09-23 NOTE — PROGRESS NOTE ADULT - PROBLEM SELECTOR PLAN 1
target bg 100-180 mg/dl  -increased lantus increase to 18u qHS  -increased humalog to 14 units sq tid ac  -c/w correctional scales from low to moderate  -monitor steroids for taper, insulin may need to be adjusted if steroids decrease to prevent hypoglycemia     DC plan: basal insulin dose TBD plus metformin 1000 mg BID

## 2017-09-23 NOTE — PROGRESS NOTE ADULT - SUBJECTIVE AND OBJECTIVE BOX
Chief Complaint: Hyperglycemia    History: Pt denies any complaints, FS high in 200s.    MEDICATIONS  (STANDING):  aspirin  chewable 162 milliGRAM(s) Oral daily  heparin  Injectable 5000 Unit(s) SubCutaneous every 12 hours  insulin lispro (HumaLOG) corrective regimen sliding scale   SubCutaneous at bedtime  dextrose 5%. 1000 milliLiter(s) (50 mL/Hr) IV Continuous <Continuous>  dextrose 50% Injectable 12.5 Gram(s) IV Push once  dextrose 50% Injectable 25 Gram(s) IV Push once  dextrose 50% Injectable 25 Gram(s) IV Push once  dextrose 5%. 1000 milliLiter(s) (50 mL/Hr) IV Continuous <Continuous>  dextrose 50% Injectable 25 Gram(s) IV Push once  buDESOnide 160 MICROgram(s)/formoterol 4.5 MICROgram(s) Inhaler 2 Puff(s) Inhalation two times a day  montelukast 10 milliGRAM(s) Oral at bedtime  cefTRIAXone   IVPB      cefTRIAXone   IVPB 1 Gram(s) IV Intermittent every 24 hours  insulin lispro (HumaLOG) corrective regimen sliding scale   SubCutaneous three times a day before meals  clotrimazole Lozenge 1 Lozenge Oral five times a day  ALBUTerol/ipratropium for Nebulization 3 milliLiter(s) Nebulizer every 6 hours  predniSONE   Tablet 40 milliGRAM(s) Oral daily  insulin lispro Injectable (HumaLOG) 14 Unit(s) SubCutaneous three times a day before meals  insulin glargine Injectable (LANTUS) 18 Unit(s) SubCutaneous at bedtime    MEDICATIONS  (PRN):  dextrose Gel 1 Dose(s) Oral once PRN Blood Glucose LESS THAN 70 milliGRAM(s)/deciliter  glucagon  Injectable 1 milliGRAM(s) IntraMuscular once PRN Glucose LESS THAN 70 milligrams/deciliter  ALBUTerol    90 MICROgram(s) HFA Inhaler 2 Puff(s) Inhalation every 6 hours PRN Shortness of Breath and/or Wheezing  dextrose Gel 1 Dose(s) Oral once PRN Blood Glucose LESS THAN 70 milliGRAM(s)/deciliter  glucagon  Injectable 1 milliGRAM(s) IntraMuscular once PRN Glucose LESS THAN 70 milligrams/deciliter      Allergies    No Known Allergies      Review of Systems:  Constitutional: No fever  Cardiovascular: No chest pain, palpitations  Respiratory: No SOB, no cough  GI: No nausea, vomiting, abdominal pain      ALL OTHER SYSTEMS REVIEWED AND NEGATIVE    PHYSICAL EXAM:  VITALS: T(C): 37.1 (09-23-17 @ 12:29)  T(F): 98.7 (09-23-17 @ 12:29), Max: 98.7 (09-22-17 @ 21:53)  HR: 68 (09-23-17 @ 12:29) (67 - 81)  BP: 132/67 (09-23-17 @ 12:29) (114/64 - 132/67)  RR:  (18 - 19)  SpO2:  (95% - 100%)  Wt(kg): --  GENERAL: NAD  RESPIRATORY: Clear to auscultation bilaterally; No rales, rhonchi, wheezing, or rubs  CARDIOVASCULAR: Regular rate and rhythm; No murmurs; no peripheral edema  GI: Soft, nontender, non distended, normal bowel sounds  SKIN: Dry, intact, No rashes or lesions    CAPILLARY BLOOD GLUCOSE  276 (09-23 @ 12:29)  281 (09-23 @ 08:44)  335 (09-22 @ 22:02)  223 (09-22 @ 16:49)  372 (09-22 @ 12:25)  273 (09-22 @ 08:31)  295 (09-21 @ 22:28)  312 (09-21 @ 16:30)  274 (09-21 @ 12:12)  188 (09-21 @ 08:42)  237 (09-20 @ 21:01)  261 (09-20 @ 17:14)      09-23    139  |  100  |  19  ----------------------------<  331<H>  3.9   |  25  |  0.58    EGFR if : 116  EGFR if non : 100    Ca    9.1      09-23  Mg     1.9     09-23  Phos  3.5     09-23    Thyroid Function Tests:  09-19 @ 06:00 TSH 1.93 FreeT4 -- T3 -- Anti TPO -- Anti Thyroglobulin Ab -- TSI --      Hemoglobin A1C, Whole Blood: 10.8 % <H> [4.0 - 5.6] (09-18-17 @ 11:50)

## 2017-09-23 NOTE — PROGRESS NOTE ADULT - PROBLEM SELECTOR PLAN 1
-still wheezing, change solumedrol to prednisone 40 mg qd, c/w duoneb to q6h atc, f/u pulm  -bronchiectasis/asthma exacerbation in the setting of RSV infection  -serum immunoglobulin levels normal  -pending aspergillosis lab  -HIV negative  -sputum cx (9/21): GPC in chains and GNR; cont ceftriaxone, s/p 3 days of zithromax  -c/w bronchodilator duoneb q6h, singulair hs  -cont chest PT

## 2017-09-23 NOTE — PROGRESS NOTE ADULT - PROBLEM SELECTOR PLAN 3
-endo f/u appreciated  -uncontrolled DM-2 with A1c 10.8%  -insulin regimen per endo, increase lantus 15 u hs and humalog 12 u ac, monitor FS -endo f/u appreciated  -uncontrolled DM-2 with A1c 10.8%  -insulin regimen per endo, increase lantus 18 u hs and humalog 14 u ac, monitor FS

## 2017-09-23 NOTE — PROGRESS NOTE ADULT - SUBJECTIVE AND OBJECTIVE BOX
CHIEF COMPLAINT: Patient is a 62y old  female who presents with a chief complaint of fluctuating sugars (20 Sep 2017 12:22)      SUBJECTIVE / OVERNIGHT EVENTS:  Pt feels ok, still wheezing and coughing with productive yellow sputum    MEDICATIONS  (STANDING):  aspirin  chewable 162 milliGRAM(s) Oral daily  heparin  Injectable 5000 Unit(s) SubCutaneous every 12 hours  insulin lispro (HumaLOG) corrective regimen sliding scale   SubCutaneous at bedtime  dextrose 5%. 1000 milliLiter(s) (50 mL/Hr) IV Continuous <Continuous>  dextrose 50% Injectable 12.5 Gram(s) IV Push once  dextrose 50% Injectable 25 Gram(s) IV Push once  dextrose 50% Injectable 25 Gram(s) IV Push once  dextrose 5%. 1000 milliLiter(s) (50 mL/Hr) IV Continuous <Continuous>  dextrose 50% Injectable 25 Gram(s) IV Push once  buDESOnide 160 MICROgram(s)/formoterol 4.5 MICROgram(s) Inhaler 2 Puff(s) Inhalation two times a day  montelukast 10 milliGRAM(s) Oral at bedtime  cefTRIAXone   IVPB      cefTRIAXone   IVPB 1 Gram(s) IV Intermittent every 24 hours  insulin lispro (HumaLOG) corrective regimen sliding scale   SubCutaneous three times a day before meals  clotrimazole Lozenge 1 Lozenge Oral five times a day  ALBUTerol/ipratropium for Nebulization 3 milliLiter(s) Nebulizer every 6 hours  predniSONE   Tablet 40 milliGRAM(s) Oral daily  insulin glargine Injectable (LANTUS) 15 Unit(s) SubCutaneous at bedtime  insulin lispro Injectable (HumaLOG) 12 Unit(s) SubCutaneous three times a day before meals    MEDICATIONS  (PRN):  dextrose Gel 1 Dose(s) Oral once PRN Blood Glucose LESS THAN 70 milliGRAM(s)/deciliter  glucagon  Injectable 1 milliGRAM(s) IntraMuscular once PRN Glucose LESS THAN 70 milligrams/deciliter  ALBUTerol    90 MICROgram(s) HFA Inhaler 2 Puff(s) Inhalation every 6 hours PRN Shortness of Breath and/or Wheezing  dextrose Gel 1 Dose(s) Oral once PRN Blood Glucose LESS THAN 70 milliGRAM(s)/deciliter  glucagon  Injectable 1 milliGRAM(s) IntraMuscular once PRN Glucose LESS THAN 70 milligrams/deciliter      VITALS:  T(F): 98.7 (09-23-17 @ 12:29), Max: 98.7 (09-22-17 @ 21:53)  HR: 68 (09-23-17 @ 12:29) (67 - 81)  BP: 132/67 (09-23-17 @ 12:29) (114/64 - 132/67)  RR: 19 (09-23-17 @ 12:29) (18 - 19)  SpO2: 100% (09-23-17 @ 12:29)      CAPILLARY BLOOD GLUCOSE  276 (09-23 @ 12:29)  281 (09-23 @ 08:44)  335 (09-22 @ 22:02)  223 (09-22 @ 16:49)      PHYSICAL EXAM:  GENERAL: NAD, well-developed  HEAD:  Atraumatic, Normocephalic  EYES: EOMI, PERRLA, conjunctiva and sclera clear  NECK: Supple, No JVD  CHEST/LUNG: diffuse wheezing  HEART: Regular rate and rhythm; No murmurs, rubs, or gallops  ABDOMEN: Soft, Nontender, Nondistended; Bowel sounds present  EXTREMITIES:  2+ Peripheral Pulses, No clubbing, cyanosis, or edema  PSYCH: AAOx3  NEUROLOGY: non-focal  SKIN: No rashes or lesions    LABS:              11.8                 139  | 25   | 19           12.99 >-----------< 171     ------------------------< 331                   36.2                 3.9  | 100  | 0.58                                         Ca 9.1   Mg 1.9   Ph 3.5        MICROBIOLOGY:    RADIOLOGY & ADDITIONAL TESTS:    Imaging Personally Reviewed:  < from: CT Chest No Cont (09.19.17 @ 14:35) >  LUNGS AND LARGE AIRWAYS: Debris in the trachea, otherwise patent central   airways. Diffuse bilateral bronchial wall thickening. Bilateral upper   lobe patchy nodular opacities. Additional tree-in-bud opacities are seen   in the left upper lobe and both lower lobes. 1.8 cm cavitary lesion in   the right upper lobe.  PLEURA: No pleural effusion.  VESSELS: Within normal limits.  HEART: Heart size is normal. No pericardial effusion.  MEDIASTINUM AND STEPHANIE: No lymphadenopathy.  CHEST WALL AND LOWER NECK: Within normal limits.  VISUALIZED UPPER ABDOMEN: Pneumobilia.  BONES: Within normal limits.    IMPRESSION: Diffuse bilateral bronchial wall thickening with bilateral   upper lobe upper lobe nodular opacities. Additional bilateral tree-in-bud   opacities. Findings are consistent with infection.    [ ] Consultant(s) Notes Reviewed:  [ ] Care Discussed with Consultants/Other Providers:

## 2017-09-23 NOTE — PROGRESS NOTE ADULT - PROBLEM SELECTOR PLAN 2
-likely chronic lesion from bronchiectasis  -ruled out for TB with negative AFB x4 but samples sent in short durations  -indeterminate quantiferon gold  -f/u aspergillosis lab  -f/u ID recs, on airborne isolation until definitely ruled out for TB  -f/u pulm and ID

## 2017-09-24 LAB
-  CEFTAZIDIME: SIGNIFICANT CHANGE UP
-  LEVOFLOXACIN: SIGNIFICANT CHANGE UP
-  TRIMETHOPRIM/SULFAMETHOXAZOLE: SIGNIFICANT CHANGE UP
BACTERIA SPT RESP CULT: SIGNIFICANT CHANGE UP
BASOPHILS # BLD AUTO: 0.04 K/UL — SIGNIFICANT CHANGE UP (ref 0–0.2)
BASOPHILS NFR BLD AUTO: 0.3 % — SIGNIFICANT CHANGE UP (ref 0–2)
BUN SERPL-MCNC: 22 MG/DL — SIGNIFICANT CHANGE UP (ref 7–23)
CALCIUM SERPL-MCNC: 8.9 MG/DL — SIGNIFICANT CHANGE UP (ref 8.4–10.5)
CHLORIDE SERPL-SCNC: 103 MMOL/L — SIGNIFICANT CHANGE UP (ref 98–107)
CO2 SERPL-SCNC: 27 MMOL/L — SIGNIFICANT CHANGE UP (ref 22–31)
CREAT SERPL-MCNC: 0.55 MG/DL — SIGNIFICANT CHANGE UP (ref 0.5–1.3)
CULTURE - ACID FAST SMEAR CONCENTRATED: SIGNIFICANT CHANGE UP
EOSINOPHIL # BLD AUTO: 0.01 K/UL — SIGNIFICANT CHANGE UP (ref 0–0.5)
EOSINOPHIL NFR BLD AUTO: 0.1 % — SIGNIFICANT CHANGE UP (ref 0–6)
GLUCOSE SERPL-MCNC: 246 MG/DL — HIGH (ref 70–99)
GRAM STN SPT: SIGNIFICANT CHANGE UP
HCT VFR BLD CALC: 36 % — SIGNIFICANT CHANGE UP (ref 34.5–45)
HGB BLD-MCNC: 11.5 G/DL — SIGNIFICANT CHANGE UP (ref 11.5–15.5)
IMM GRANULOCYTES # BLD AUTO: 0.58 # — SIGNIFICANT CHANGE UP
IMM GRANULOCYTES NFR BLD AUTO: 4.4 % — HIGH (ref 0–1.5)
LYMPHOCYTES # BLD AUTO: 18.1 % — SIGNIFICANT CHANGE UP (ref 13–44)
LYMPHOCYTES # BLD AUTO: 2.39 K/UL — SIGNIFICANT CHANGE UP (ref 1–3.3)
MAGNESIUM SERPL-MCNC: 2 MG/DL — SIGNIFICANT CHANGE UP (ref 1.6–2.6)
MCHC RBC-ENTMCNC: 26.6 PG — LOW (ref 27–34)
MCHC RBC-ENTMCNC: 31.9 % — LOW (ref 32–36)
MCV RBC AUTO: 83.3 FL — SIGNIFICANT CHANGE UP (ref 80–100)
METHOD TYPE: SIGNIFICANT CHANGE UP
MONOCYTES # BLD AUTO: 0.66 K/UL — SIGNIFICANT CHANGE UP (ref 0–0.9)
MONOCYTES NFR BLD AUTO: 5 % — SIGNIFICANT CHANGE UP (ref 2–14)
NEUTROPHILS # BLD AUTO: 9.49 K/UL — HIGH (ref 1.8–7.4)
NEUTROPHILS NFR BLD AUTO: 72.1 % — SIGNIFICANT CHANGE UP (ref 43–77)
NRBC # FLD: 0 — SIGNIFICANT CHANGE UP
ORGANISM # SPEC MICROSCOPIC CNT: SIGNIFICANT CHANGE UP
ORGANISM # SPEC MICROSCOPIC CNT: SIGNIFICANT CHANGE UP
PHOSPHATE SERPL-MCNC: 3.5 MG/DL — SIGNIFICANT CHANGE UP (ref 2.5–4.5)
PLATELET # BLD AUTO: 181 K/UL — SIGNIFICANT CHANGE UP (ref 150–400)
PMV BLD: 11.6 FL — SIGNIFICANT CHANGE UP (ref 7–13)
POTASSIUM SERPL-MCNC: 4.2 MMOL/L — SIGNIFICANT CHANGE UP (ref 3.5–5.3)
POTASSIUM SERPL-SCNC: 4.2 MMOL/L — SIGNIFICANT CHANGE UP (ref 3.5–5.3)
RBC # BLD: 4.32 M/UL — SIGNIFICANT CHANGE UP (ref 3.8–5.2)
RBC # FLD: 15.2 % — HIGH (ref 10.3–14.5)
SODIUM SERPL-SCNC: 139 MMOL/L — SIGNIFICANT CHANGE UP (ref 135–145)
SPECIMEN SOURCE: SIGNIFICANT CHANGE UP
WBC # BLD: 13.17 K/UL — HIGH (ref 3.8–10.5)
WBC # FLD AUTO: 13.17 K/UL — HIGH (ref 3.8–10.5)

## 2017-09-24 PROCEDURE — 99233 SBSQ HOSP IP/OBS HIGH 50: CPT

## 2017-09-24 RX ORDER — LANOLIN ALCOHOL/MO/W.PET/CERES
3 CREAM (GRAM) TOPICAL AT BEDTIME
Qty: 0 | Refills: 0 | Status: DISCONTINUED | OUTPATIENT
Start: 2017-09-24 | End: 2017-09-29

## 2017-09-24 RX ADMIN — Medication 14 UNIT(S): at 12:40

## 2017-09-24 RX ADMIN — Medication 162 MILLIGRAM(S): at 12:40

## 2017-09-24 RX ADMIN — Medication 1 LOZENGE: at 00:44

## 2017-09-24 RX ADMIN — HEPARIN SODIUM 5000 UNIT(S): 5000 INJECTION INTRAVENOUS; SUBCUTANEOUS at 06:00

## 2017-09-24 RX ADMIN — Medication 3 MILLILITER(S): at 15:33

## 2017-09-24 RX ADMIN — Medication 1 LOZENGE: at 17:45

## 2017-09-24 RX ADMIN — BUDESONIDE AND FORMOTEROL FUMARATE DIHYDRATE 2 PUFF(S): 160; 4.5 AEROSOL RESPIRATORY (INHALATION) at 08:49

## 2017-09-24 RX ADMIN — INSULIN GLARGINE 18 UNIT(S): 100 INJECTION, SOLUTION SUBCUTANEOUS at 22:28

## 2017-09-24 RX ADMIN — Medication 14 UNIT(S): at 08:48

## 2017-09-24 RX ADMIN — Medication 40 MILLIGRAM(S): at 06:00

## 2017-09-24 RX ADMIN — Medication 3 MILLILITER(S): at 21:28

## 2017-09-24 RX ADMIN — Medication 1 LOZENGE: at 20:56

## 2017-09-24 RX ADMIN — Medication 3 MILLILITER(S): at 04:30

## 2017-09-24 RX ADMIN — Medication 8: at 12:40

## 2017-09-24 RX ADMIN — MONTELUKAST 10 MILLIGRAM(S): 4 TABLET, CHEWABLE ORAL at 22:28

## 2017-09-24 RX ADMIN — HEPARIN SODIUM 5000 UNIT(S): 5000 INJECTION INTRAVENOUS; SUBCUTANEOUS at 17:46

## 2017-09-24 RX ADMIN — Medication 3 MILLILITER(S): at 10:18

## 2017-09-24 RX ADMIN — Medication 2: at 08:48

## 2017-09-24 RX ADMIN — Medication 3 MILLIGRAM(S): at 22:28

## 2017-09-24 RX ADMIN — Medication 1 LOZENGE: at 12:40

## 2017-09-24 RX ADMIN — BUDESONIDE AND FORMOTEROL FUMARATE DIHYDRATE 2 PUFF(S): 160; 4.5 AEROSOL RESPIRATORY (INHALATION) at 20:55

## 2017-09-24 RX ADMIN — Medication 1 LOZENGE: at 08:49

## 2017-09-24 RX ADMIN — Medication 14 UNIT(S): at 17:44

## 2017-09-24 NOTE — PROGRESS NOTE ADULT - PROBLEM SELECTOR PLAN 2
-likely chronic lesion from bronchiectasis  -ruled out for TB with negative AFB x5 but samples sent in short durations  -indeterminate quantiferon gold  -f/u aspergillosis lab  -f/u ID recs, on airborne isolation until definitely ruled out for TB  -f/u pulm and ID

## 2017-09-24 NOTE — PROGRESS NOTE ADULT - SUBJECTIVE AND OBJECTIVE BOX
CHIEF COMPLAINT: Patient is a 62y old  female who presents with a chief complaint of fluctuating sugars (20 Sep 2017 12:22)      SUBJECTIVE / OVERNIGHT EVENTS:  Pt still wheezing, breathing better    MEDICATIONS  (STANDING):  aspirin  chewable 162 milliGRAM(s) Oral daily  heparin  Injectable 5000 Unit(s) SubCutaneous every 12 hours  insulin lispro (HumaLOG) corrective regimen sliding scale   SubCutaneous at bedtime  dextrose 5%. 1000 milliLiter(s) (50 mL/Hr) IV Continuous <Continuous>  dextrose 50% Injectable 12.5 Gram(s) IV Push once  dextrose 50% Injectable 25 Gram(s) IV Push once  dextrose 50% Injectable 25 Gram(s) IV Push once  dextrose 5%. 1000 milliLiter(s) (50 mL/Hr) IV Continuous <Continuous>  dextrose 50% Injectable 25 Gram(s) IV Push once  buDESOnide 160 MICROgram(s)/formoterol 4.5 MICROgram(s) Inhaler 2 Puff(s) Inhalation two times a day  montelukast 10 milliGRAM(s) Oral at bedtime  cefTRIAXone   IVPB      cefTRIAXone   IVPB 1 Gram(s) IV Intermittent every 24 hours  insulin lispro (HumaLOG) corrective regimen sliding scale   SubCutaneous three times a day before meals  clotrimazole Lozenge 1 Lozenge Oral five times a day  ALBUTerol/ipratropium for Nebulization 3 milliLiter(s) Nebulizer every 6 hours  predniSONE   Tablet 40 milliGRAM(s) Oral daily  insulin lispro Injectable (HumaLOG) 14 Unit(s) SubCutaneous three times a day before meals  insulin glargine Injectable (LANTUS) 18 Unit(s) SubCutaneous at bedtime    MEDICATIONS  (PRN):  dextrose Gel 1 Dose(s) Oral once PRN Blood Glucose LESS THAN 70 milliGRAM(s)/deciliter  glucagon  Injectable 1 milliGRAM(s) IntraMuscular once PRN Glucose LESS THAN 70 milligrams/deciliter  ALBUTerol    90 MICROgram(s) HFA Inhaler 2 Puff(s) Inhalation every 6 hours PRN Shortness of Breath and/or Wheezing  dextrose Gel 1 Dose(s) Oral once PRN Blood Glucose LESS THAN 70 milliGRAM(s)/deciliter  glucagon  Injectable 1 milliGRAM(s) IntraMuscular once PRN Glucose LESS THAN 70 milligrams/deciliter  melatonin 3 milliGRAM(s) Oral at bedtime PRN Insomnia      VITALS:  T(F): 98.2 (09-24-17 @ 12:05), Max: 98.4 (09-24-17 @ 05:56)  HR: 81 (09-24-17 @ 15:33) (70 - 92)  BP: 131/69 (09-24-17 @ 12:05) (124/69 - 131/69)  RR: 19 (09-24-17 @ 12:05) (18 - 19)  SpO2: 98% (09-24-17 @ 15:33)      CAPILLARY BLOOD GLUCOSE  304 (09-24 @ 12:05)  156 (09-24 @ 08:41)  166 (09-23 @ 21:38)  132 (09-23 @ 16:26)      PHYSICAL EXAM:  GENERAL: NAD, well-developed  HEAD:  Atraumatic, Normocephalic  EYES: EOMI, PERRLA, conjunctiva and sclera clear  NECK: Supple, No JVD  CHEST/LUNG: diffuse expiratory wheezing  HEART: Regular rate and rhythm; No murmurs, rubs, or gallops  ABDOMEN: Soft, Nontender, Nondistended; Bowel sounds present  EXTREMITIES:  2+ Peripheral Pulses, No clubbing, cyanosis, or edema  PSYCH: AAOx3  NEUROLOGY: non-focal  SKIN: No rashes or lesions    LABS:              11.5                 139  | 27   | 22           13.17 >-----------< 181     ------------------------< 246                   36.0                 4.2  | 103  | 0.55                                         Ca 8.9   Mg 2.0   Ph 3.5        MICROBIOLOGY:          RADIOLOGY & ADDITIONAL TESTS:    Imaging Personally Reviewed:    [ ] Consultant(s) Notes Reviewed:  [ ] Care Discussed with Consultants/Other Providers:

## 2017-09-24 NOTE — PROGRESS NOTE ADULT - PROBLEM SELECTOR PLAN 1
-still wheezing, cont prednisone 40 mg qd, c/w duoneb to q6h atc, f/u pulm  -bronchiectasis/asthma exacerbation in the setting of RSV infection  -serum immunoglobulin levels normal  -pending aspergillosis lab  -HIV negative  -sputum cx (9/21): stenotrophomonas resistant to ceftazidime but sensitive to levaquin and bactrim; d/c ceftriaxone and start levaquin, s/p 3 days of zithromax  -c/w bronchodilator duoneb q6h, singulair hs  -cont chest PT

## 2017-09-24 NOTE — PROGRESS NOTE ADULT - PROBLEM SELECTOR PLAN 3
-endo f/u appreciated  -uncontrolled DM-2 with A1c 10.8%  -insulin regimen per endo, glycemic control better on lantus 18 u hs and humalog 14 u ac, monitor FS

## 2017-09-25 LAB
BASOPHILS # BLD AUTO: 0.11 K/UL — SIGNIFICANT CHANGE UP (ref 0–0.2)
BASOPHILS NFR BLD AUTO: 0.9 % — SIGNIFICANT CHANGE UP (ref 0–2)
BUN SERPL-MCNC: 25 MG/DL — HIGH (ref 7–23)
CALCIUM SERPL-MCNC: 8.9 MG/DL — SIGNIFICANT CHANGE UP (ref 8.4–10.5)
CHLORIDE SERPL-SCNC: 101 MMOL/L — SIGNIFICANT CHANGE UP (ref 98–107)
CO2 SERPL-SCNC: 26 MMOL/L — SIGNIFICANT CHANGE UP (ref 22–31)
CREAT SERPL-MCNC: 0.59 MG/DL — SIGNIFICANT CHANGE UP (ref 0.5–1.3)
EOSINOPHIL # BLD AUTO: 0.13 K/UL — SIGNIFICANT CHANGE UP (ref 0–0.5)
EOSINOPHIL NFR BLD AUTO: 1 % — SIGNIFICANT CHANGE UP (ref 0–6)
GALACTOMANNAN AG SERPL-ACNC: <0.5 — SIGNIFICANT CHANGE UP
GLUCOSE SERPL-MCNC: 118 MG/DL — HIGH (ref 70–99)
HCT VFR BLD CALC: 39.6 % — SIGNIFICANT CHANGE UP (ref 34.5–45)
HGB BLD-MCNC: 12.7 G/DL — SIGNIFICANT CHANGE UP (ref 11.5–15.5)
IMM GRANULOCYTES # BLD AUTO: 0.65 # — SIGNIFICANT CHANGE UP
IMM GRANULOCYTES NFR BLD AUTO: 5.2 % — HIGH (ref 0–1.5)
LYMPHOCYTES # BLD AUTO: 34.4 % — SIGNIFICANT CHANGE UP (ref 13–44)
LYMPHOCYTES # BLD AUTO: 4.29 K/UL — HIGH (ref 1–3.3)
MAGNESIUM SERPL-MCNC: 2 MG/DL — SIGNIFICANT CHANGE UP (ref 1.6–2.6)
MCHC RBC-ENTMCNC: 26.3 PG — LOW (ref 27–34)
MCHC RBC-ENTMCNC: 32.1 % — SIGNIFICANT CHANGE UP (ref 32–36)
MCV RBC AUTO: 82.2 FL — SIGNIFICANT CHANGE UP (ref 80–100)
MONOCYTES # BLD AUTO: 0.74 K/UL — SIGNIFICANT CHANGE UP (ref 0–0.9)
MONOCYTES NFR BLD AUTO: 5.9 % — SIGNIFICANT CHANGE UP (ref 2–14)
NEUTROPHILS # BLD AUTO: 6.55 K/UL — SIGNIFICANT CHANGE UP (ref 1.8–7.4)
NEUTROPHILS NFR BLD AUTO: 52.6 % — SIGNIFICANT CHANGE UP (ref 43–77)
NRBC # FLD: 0 — SIGNIFICANT CHANGE UP
PHOSPHATE SERPL-MCNC: 3.4 MG/DL — SIGNIFICANT CHANGE UP (ref 2.5–4.5)
PLATELET # BLD AUTO: 190 K/UL — SIGNIFICANT CHANGE UP (ref 150–400)
PMV BLD: 11.1 FL — SIGNIFICANT CHANGE UP (ref 7–13)
POTASSIUM SERPL-MCNC: 4 MMOL/L — SIGNIFICANT CHANGE UP (ref 3.5–5.3)
POTASSIUM SERPL-SCNC: 4 MMOL/L — SIGNIFICANT CHANGE UP (ref 3.5–5.3)
RBC # BLD: 4.82 M/UL — SIGNIFICANT CHANGE UP (ref 3.8–5.2)
RBC # FLD: 15.6 % — HIGH (ref 10.3–14.5)
SODIUM SERPL-SCNC: 139 MMOL/L — SIGNIFICANT CHANGE UP (ref 135–145)
WBC # BLD: 12.47 K/UL — HIGH (ref 3.8–10.5)
WBC # FLD AUTO: 12.47 K/UL — HIGH (ref 3.8–10.5)

## 2017-09-25 PROCEDURE — 99233 SBSQ HOSP IP/OBS HIGH 50: CPT

## 2017-09-25 PROCEDURE — 99232 SBSQ HOSP IP/OBS MODERATE 35: CPT

## 2017-09-25 PROCEDURE — 99232 SBSQ HOSP IP/OBS MODERATE 35: CPT | Mod: GC

## 2017-09-25 RX ORDER — INSULIN GLARGINE 100 [IU]/ML
22 INJECTION, SOLUTION SUBCUTANEOUS AT BEDTIME
Qty: 0 | Refills: 0 | Status: DISCONTINUED | OUTPATIENT
Start: 2017-09-25 | End: 2017-09-26

## 2017-09-25 RX ORDER — TUBERCULIN PURIFIED PROTEIN DERIVATIVE 5 [IU]/.1ML
5 INJECTION, SOLUTION INTRADERMAL ONCE
Qty: 0 | Refills: 0 | Status: COMPLETED | OUTPATIENT
Start: 2017-09-25 | End: 2017-09-26

## 2017-09-25 RX ORDER — ALBUTEROL 90 UG/1
2 AEROSOL, METERED ORAL EVERY 6 HOURS
Qty: 0 | Refills: 0 | Status: DISCONTINUED | OUTPATIENT
Start: 2017-09-25 | End: 2017-09-29

## 2017-09-25 RX ORDER — INSULIN LISPRO 100/ML
16 VIAL (ML) SUBCUTANEOUS
Qty: 0 | Refills: 0 | Status: DISCONTINUED | OUTPATIENT
Start: 2017-09-25 | End: 2017-09-26

## 2017-09-25 RX ADMIN — Medication 1 LOZENGE: at 20:56

## 2017-09-25 RX ADMIN — Medication 14 UNIT(S): at 12:36

## 2017-09-25 RX ADMIN — Medication 1 LOZENGE: at 17:50

## 2017-09-25 RX ADMIN — MONTELUKAST 10 MILLIGRAM(S): 4 TABLET, CHEWABLE ORAL at 22:44

## 2017-09-25 RX ADMIN — Medication 3 MILLILITER(S): at 16:12

## 2017-09-25 RX ADMIN — BUDESONIDE AND FORMOTEROL FUMARATE DIHYDRATE 2 PUFF(S): 160; 4.5 AEROSOL RESPIRATORY (INHALATION) at 09:02

## 2017-09-25 RX ADMIN — Medication 6: at 12:35

## 2017-09-25 RX ADMIN — INSULIN GLARGINE 22 UNIT(S): 100 INJECTION, SOLUTION SUBCUTANEOUS at 22:51

## 2017-09-25 RX ADMIN — Medication 16 UNIT(S): at 17:49

## 2017-09-25 RX ADMIN — Medication 3 MILLIGRAM(S): at 22:44

## 2017-09-25 RX ADMIN — Medication 14 UNIT(S): at 09:02

## 2017-09-25 RX ADMIN — Medication 162 MILLIGRAM(S): at 12:36

## 2017-09-25 RX ADMIN — Medication 1 LOZENGE: at 12:36

## 2017-09-25 RX ADMIN — HEPARIN SODIUM 5000 UNIT(S): 5000 INJECTION INTRAVENOUS; SUBCUTANEOUS at 05:34

## 2017-09-25 RX ADMIN — BUDESONIDE AND FORMOTEROL FUMARATE DIHYDRATE 2 PUFF(S): 160; 4.5 AEROSOL RESPIRATORY (INHALATION) at 20:56

## 2017-09-25 RX ADMIN — Medication 2: at 22:51

## 2017-09-25 RX ADMIN — HEPARIN SODIUM 5000 UNIT(S): 5000 INJECTION INTRAVENOUS; SUBCUTANEOUS at 17:50

## 2017-09-25 RX ADMIN — Medication 3 MILLILITER(S): at 03:48

## 2017-09-25 RX ADMIN — Medication 1 LOZENGE: at 09:02

## 2017-09-25 RX ADMIN — Medication 3 MILLILITER(S): at 21:30

## 2017-09-25 RX ADMIN — Medication 1 LOZENGE: at 00:03

## 2017-09-25 RX ADMIN — Medication 1 LOZENGE: at 23:32

## 2017-09-25 RX ADMIN — Medication 40 MILLIGRAM(S): at 05:34

## 2017-09-25 RX ADMIN — Medication 4: at 09:01

## 2017-09-25 RX ADMIN — Medication 3 MILLILITER(S): at 10:22

## 2017-09-25 NOTE — PROGRESS NOTE ADULT - PROBLEM SELECTOR PLAN 1
-still wheezing, cont prednisone 40 mg qd, c/w duoneb to q6h atc,   -f/u pulm  -bronchiectasis/asthma exacerbation in the setting of RSV infection  -serum immunoglobulin levels normal  -pending aspergillosis lab  -HIV negative  -sputum cx (9/21): stenotrophomonas resistant to ceftazidime but sensitive to levaquin and bactrim; c/w  levaquin, s/p 3 days of zithromax  -c/w bronchodilator duoneb q6h, singulair hs  -cont chest PT

## 2017-09-25 NOTE — PROGRESS NOTE ADULT - SUBJECTIVE AND OBJECTIVE BOX
Chief Complaint: Hyperglycemia on steroids    History: Pt denies any complaints, no n/v, tolerates po.  FS high in 200s.    MEDICATIONS  (STANDING):  aspirin  chewable 162 milliGRAM(s) Oral daily  heparin  Injectable 5000 Unit(s) SubCutaneous every 12 hours  insulin lispro (HumaLOG) corrective regimen sliding scale   SubCutaneous at bedtime  dextrose 5%. 1000 milliLiter(s) (50 mL/Hr) IV Continuous <Continuous>  dextrose 50% Injectable 12.5 Gram(s) IV Push once  dextrose 50% Injectable 25 Gram(s) IV Push once  dextrose 50% Injectable 25 Gram(s) IV Push once  dextrose 5%. 1000 milliLiter(s) (50 mL/Hr) IV Continuous <Continuous>  dextrose 50% Injectable 25 Gram(s) IV Push once  buDESOnide 160 MICROgram(s)/formoterol 4.5 MICROgram(s) Inhaler 2 Puff(s) Inhalation two times a day  montelukast 10 milliGRAM(s) Oral at bedtime  insulin lispro (HumaLOG) corrective regimen sliding scale   SubCutaneous three times a day before meals  clotrimazole Lozenge 1 Lozenge Oral five times a day  ALBUTerol/ipratropium for Nebulization 3 milliLiter(s) Nebulizer every 6 hours  predniSONE   Tablet 40 milliGRAM(s) Oral daily  insulin lispro Injectable (HumaLOG) 14 Unit(s) SubCutaneous three times a day before meals  insulin glargine Injectable (LANTUS) 18 Unit(s) SubCutaneous at bedtime    MEDICATIONS  (PRN):  dextrose Gel 1 Dose(s) Oral once PRN Blood Glucose LESS THAN 70 milliGRAM(s)/deciliter  glucagon  Injectable 1 milliGRAM(s) IntraMuscular once PRN Glucose LESS THAN 70 milligrams/deciliter  ALBUTerol    90 MICROgram(s) HFA Inhaler 2 Puff(s) Inhalation every 6 hours PRN Shortness of Breath and/or Wheezing  dextrose Gel 1 Dose(s) Oral once PRN Blood Glucose LESS THAN 70 milliGRAM(s)/deciliter  glucagon  Injectable 1 milliGRAM(s) IntraMuscular once PRN Glucose LESS THAN 70 milligrams/deciliter      Allergies    No Known Allergies      Review of Systems:  Constitutional: No fever  Cardiovascular: No chest pain, palpitations  Respiratory: No SOB, no cough  GI: No nausea, vomiting, abdominal pain      ALL OTHER SYSTEMS REVIEWED AND NEGATIVE    PHYSICAL EXAM:  Vital Signs Last 24 Hrs  T(C): 36.7 (25 Sep 2017 15:17), Max: 36.7 (24 Sep 2017 22:43)  T(F): 98 (25 Sep 2017 15:17), Max: 98 (24 Sep 2017 22:43)  HR: 83 (25 Sep 2017 15:17) (72 - 86)  BP: 121/73 (25 Sep 2017 15:17) (115/75 - 121/73)  BP(mean): --  RR: 18 (25 Sep 2017 15:17) (18 - 18)  SpO2: 97% (25 Sep 2017 15:17) (97% - 100%)  GENERAL: NAD  RESPIRATORY: Clear to auscultation bilaterally; No rales, rhonchi, wheezing, or rubs  CARDIOVASCULAR: Regular rate and rhythm; No murmurs; no peripheral edema  GI: Soft, nontender, non distended, normal bowel sounds  SKIN: Dry, intact, No rashes or lesions    CAPILLARY BLOOD GLUCOSE  256 (25 Sep 2017 12:23)  202 (25 Sep 2017 08:35)  109 (24 Sep 2017 22:41)  112 (24 Sep 2017 16:47)      276 (09-23 @ 12:29)  281 (09-23 @ 08:44)  335 (09-22 @ 22:02)  223 (09-22 @ 16:49)  372 (09-22 @ 12:25)  273 (09-22 @ 08:31)  295 (09-21 @ 22:28)  312 (09-21 @ 16:30)  274 (09-21 @ 12:12)  188 (09-21 @ 08:42)  237 (09-20 @ 21:01)  261 (09-20 @ 17:14)    09-25    139  |  101  |  25<H>  ----------------------------<  118<H>  4.0   |  26  |  0.59    Ca    8.9      25 Sep 2017 06:00  Phos  3.4     09-25  Mg     2.0     09-25      Thyroid Function Tests:  09-19 @ 06:00 TSH 1.93 FreeT4 -- T3 -- Anti TPO -- Anti Thyroglobulin Ab -- TSI --      Hemoglobin A1C, Whole Blood: 10.8 % <H> [4.0 - 5.6] (09-18-17 @ 11:50)

## 2017-09-25 NOTE — PROGRESS NOTE ADULT - ATTENDING COMMENTS
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  Would continue with current dose of prednisone, nebsQ6 standing and albuterol Q2 PRN, please give patient acapella to help with airway clearance.  Agree with abx change.

## 2017-09-25 NOTE — PROGRESS NOTE ADULT - SUBJECTIVE AND OBJECTIVE BOX
CHIEF COMPLAINT:    Interval Events:    REVIEW OF SYSTEMS:  Constitutional: [ ] negative [ ] fevers [ ] chills [ ] weight loss [ ] weight gain  HEENT: [ ] negative [ ] dry eyes [ ] eye irritation [ ] postnasal drip [ ] nasal congestion  CV: [ ] negative  [ ] chest pain [ ] orthopnea [ ] palpitations [ ] murmur  Resp: [ ] negative [ ] cough [ ] shortness of breath [ ] dyspnea [ ] wheezing [ ] sputum [ ] hemoptysis  GI: [ ] negative [ ] nausea [ ] vomiting [ ] diarrhea [ ] constipation [ ] abd pain [ ] dysphagia   : [ ] negative [ ] dysuria [ ] nocturia [ ] hematuria [ ] increased urinary frequency  Musculoskeletal: [ ] negative [ ] back pain [ ] myalgias [ ] arthralgias [ ] fracture  Skin: [ ] negative [ ] rash [ ] itch  Neurological: [ ] negative [ ] headache [ ] dizziness [ ] syncope [ ] weakness [ ] numbness  Psychiatric: [ ] negative [ ] anxiety [ ] depression  Endocrine: [ ] negative [ ] diabetes [ ] thyroid problem  Hematologic/Lymphatic: [ ] negative [ ] anemia [ ] bleeding problem  Allergic/Immunologic: [ ] negative [ ] itchy eyes [ ] nasal discharge [ ] hives [ ] angioedema  [ ] All other systems negative  [ ] Unable to assess ROS because ________    OBJECTIVE:  ICU Vital Signs Last 24 Hrs  T(C): 36.6 (25 Sep 2017 05:32), Max: 36.8 (24 Sep 2017 12:05)  T(F): 97.9 (25 Sep 2017 05:32), Max: 98.2 (24 Sep 2017 12:05)  HR: 72 (25 Sep 2017 05:32) (72 - 92)  BP: 117/68 (25 Sep 2017 05:32) (115/75 - 131/69)  BP(mean): --  ABP: --  ABP(mean): --  RR: 18 (25 Sep 2017 05:32) (18 - 19)  SpO2: 98% (25 Sep 2017 05:32) (98% - 100%)        CAPILLARY BLOOD GLUCOSE  109 (24 Sep 2017 22:41)          PHYSICAL EXAM:  General:   HEENT:   Lymph Nodes:  Neck:   Respiratory:   Cardiovascular:   Abdomen:   Extremities:   Skin:   Neurological:  Psychiatry:    HOSPITAL MEDICATIONS:  Standing Meds:  aspirin  chewable 162 milliGRAM(s) Oral daily  heparin  Injectable 5000 Unit(s) SubCutaneous every 12 hours  insulin lispro (HumaLOG) corrective regimen sliding scale   SubCutaneous at bedtime  dextrose 5%. 1000 milliLiter(s) IV Continuous <Continuous>  dextrose 50% Injectable 12.5 Gram(s) IV Push once  dextrose 50% Injectable 25 Gram(s) IV Push once  dextrose 50% Injectable 25 Gram(s) IV Push once  dextrose 5%. 1000 milliLiter(s) IV Continuous <Continuous>  dextrose 50% Injectable 25 Gram(s) IV Push once  buDESOnide 160 MICROgram(s)/formoterol 4.5 MICROgram(s) Inhaler 2 Puff(s) Inhalation two times a day  montelukast 10 milliGRAM(s) Oral at bedtime  insulin lispro (HumaLOG) corrective regimen sliding scale   SubCutaneous three times a day before meals  clotrimazole Lozenge 1 Lozenge Oral five times a day  ALBUTerol/ipratropium for Nebulization 3 milliLiter(s) Nebulizer every 6 hours  predniSONE   Tablet 40 milliGRAM(s) Oral daily  insulin lispro Injectable (HumaLOG) 14 Unit(s) SubCutaneous three times a day before meals  insulin glargine Injectable (LANTUS) 18 Unit(s) SubCutaneous at bedtime  levoFLOXacin  Tablet 500 milliGRAM(s) Oral every 24 hours      PRN Meds:  dextrose Gel 1 Dose(s) Oral once PRN  glucagon  Injectable 1 milliGRAM(s) IntraMuscular once PRN  ALBUTerol    90 MICROgram(s) HFA Inhaler 2 Puff(s) Inhalation every 6 hours PRN  dextrose Gel 1 Dose(s) Oral once PRN  glucagon  Injectable 1 milliGRAM(s) IntraMuscular once PRN  melatonin 3 milliGRAM(s) Oral at bedtime PRN      LABS:                        12.7   12.47 )-----------( 190      ( 25 Sep 2017 06:00 )             39.6     Hgb Trend: 12.7<--, 11.5<--, 11.8<--, 12.6<--, 11.9<--  09-25    139  |  101  |  25<H>  ----------------------------<  118<H>  4.0   |  26  |  0.59    Ca    8.9      25 Sep 2017 06:00  Phos  3.4     09-25  Mg     2.0     09-25      Creatinine Trend: 0.59<--, 0.55<--, 0.58<--, 0.56<--, 0.62<--, 0.58<--            MICROBIOLOGY:     RADIOLOGY:  [ ] Reviewed and interpreted by me    PULMONARY FUNCTION TESTS:    EKG: CHIEF COMPLAINT: chest pain    Interval Events: none    REVIEW OF SYSTEMS: Small improvement in SOB and cough. Still significant [place], Wheezing and sensation of irritation in the chest.  Pt also complains of abdominal dicofort at her surgical site.  Chronic skin itching. and gas.     [x] All other systems negative  [ ] Unable to assess ROS because ________    OBJECTIVE:  ICU Vital Signs Last 24 Hrs  T(C): 36.6 (25 Sep 2017 05:32), Max: 36.8 (24 Sep 2017 12:05)  T(F): 97.9 (25 Sep 2017 05:32), Max: 98.2 (24 Sep 2017 12:05)  HR: 72 (25 Sep 2017 05:32) (72 - 92)  BP: 117/68 (25 Sep 2017 05:32) (115/75 - 131/69)  BP(mean): --  ABP: --  ABP(mean): --  RR: 18 (25 Sep 2017 05:32) (18 - 19)  SpO2: 98% (25 Sep 2017 05:32) (98% - 100%)        CAPILLARY BLOOD GLUCOSE  109 (24 Sep 2017 22:41)          PHYSICAL EXAM:  GENERAL: NAD  HEENT:  Atraumatic, moist mucous membranes  RESPIRATORY: Diffuse expiratory wheeze and reduced ronchi.  CARDIOVASCULAR: Regular rate and rhythm; No murmurs; no peripheral edema  GI: Soft, nontender, non distended, normal bowel sounds  SKIN: Dry, intact, No rashes or lesions Signs of excoriation.   NEURO: no deficits.  PSYCH: normal affect, normal mood      HOSPITAL MEDICATIONS:  Standing Meds:  aspirin  chewable 162 milliGRAM(s) Oral daily  heparin  Injectable 5000 Unit(s) SubCutaneous every 12 hours  insulin lispro (HumaLOG) corrective regimen sliding scale   SubCutaneous at bedtime  dextrose 5%. 1000 milliLiter(s) IV Continuous <Continuous>  dextrose 50% Injectable 12.5 Gram(s) IV Push once  dextrose 50% Injectable 25 Gram(s) IV Push once  dextrose 50% Injectable 25 Gram(s) IV Push once  dextrose 5%. 1000 milliLiter(s) IV Continuous <Continuous>  dextrose 50% Injectable 25 Gram(s) IV Push once  buDESOnide 160 MICROgram(s)/formoterol 4.5 MICROgram(s) Inhaler 2 Puff(s) Inhalation two times a day  montelukast 10 milliGRAM(s) Oral at bedtime  insulin lispro (HumaLOG) corrective regimen sliding scale   SubCutaneous three times a day before meals  clotrimazole Lozenge 1 Lozenge Oral five times a day  ALBUTerol/ipratropium for Nebulization 3 milliLiter(s) Nebulizer every 6 hours  predniSONE   Tablet 40 milliGRAM(s) Oral daily  insulin lispro Injectable (HumaLOG) 14 Unit(s) SubCutaneous three times a day before meals  insulin glargine Injectable (LANTUS) 18 Unit(s) SubCutaneous at bedtime  levoFLOXacin  Tablet 500 milliGRAM(s) Oral every 24 hours      PRN Meds:  dextrose Gel 1 Dose(s) Oral once PRN  glucagon  Injectable 1 milliGRAM(s) IntraMuscular once PRN  ALBUTerol    90 MICROgram(s) HFA Inhaler 2 Puff(s) Inhalation every 6 hours PRN  dextrose Gel 1 Dose(s) Oral once PRN  glucagon  Injectable 1 milliGRAM(s) IntraMuscular once PRN  melatonin 3 milliGRAM(s) Oral at bedtime PRN      LABS:                        12.7   12.47 )-----------( 190      ( 25 Sep 2017 06:00 )             39.6     Hgb Trend: 12.7<--, 11.5<--, 11.8<--, 12.6<--, 11.9<--  09-25    139  |  101  |  25<H>  ----------------------------<  118<H>  4.0   |  26  |  0.59    Ca    8.9      25 Sep 2017 06:00  Phos  3.4     09-25  Mg     2.0     09-25      Creatinine Trend: 0.59<--, 0.55<--, 0.58<--, 0.56<--, 0.62<--, 0.58<--

## 2017-09-25 NOTE — PROGRESS NOTE ADULT - SUBJECTIVE AND OBJECTIVE BOX
Patient is a 62y old  Female who presents for f/u of left palatal ulcer and suspect of palatal yeast infection associated with poor denture hygiene.    HPI: patient seen and evaluated 4 days ago; patient has been compliant with clotrimazole lozenges and keeping dentures out of mouth; reported occasional pain and bleeding on tongue.       PAST MEDICAL & SURGICAL HISTORY:  Bronchitis  DM (diabetes mellitus)  History of cholecystectomy      MEDICATIONS  (STANDING):  aspirin  chewable 162 milliGRAM(s) Oral daily  heparin  Injectable 5000 Unit(s) SubCutaneous every 12 hours  insulin lispro (HumaLOG) corrective regimen sliding scale   SubCutaneous at bedtime  dextrose 5%. 1000 milliLiter(s) (50 mL/Hr) IV Continuous <Continuous>  dextrose 50% Injectable 12.5 Gram(s) IV Push once  dextrose 50% Injectable 25 Gram(s) IV Push once  dextrose 50% Injectable 25 Gram(s) IV Push once  dextrose 5%. 1000 milliLiter(s) (50 mL/Hr) IV Continuous <Continuous>  dextrose 50% Injectable 25 Gram(s) IV Push once  buDESOnide 160 MICROgram(s)/formoterol 4.5 MICROgram(s) Inhaler 2 Puff(s) Inhalation two times a day  montelukast 10 milliGRAM(s) Oral at bedtime  insulin lispro (HumaLOG) corrective regimen sliding scale   SubCutaneous three times a day before meals  clotrimazole Lozenge 1 Lozenge Oral five times a day  ALBUTerol/ipratropium for Nebulization 3 milliLiter(s) Nebulizer every 6 hours  predniSONE   Tablet 40 milliGRAM(s) Oral daily  insulin lispro Injectable (HumaLOG) 14 Unit(s) SubCutaneous three times a day before meals  insulin glargine Injectable (LANTUS) 18 Unit(s) SubCutaneous at bedtime  levoFLOXacin  Tablet 500 milliGRAM(s) Oral every 24 hours    MEDICATIONS  (PRN):  dextrose Gel 1 Dose(s) Oral once PRN Blood Glucose LESS THAN 70 milliGRAM(s)/deciliter  glucagon  Injectable 1 milliGRAM(s) IntraMuscular once PRN Glucose LESS THAN 70 milligrams/deciliter  ALBUTerol    90 MICROgram(s) HFA Inhaler 2 Puff(s) Inhalation every 6 hours PRN Shortness of Breath and/or Wheezing  dextrose Gel 1 Dose(s) Oral once PRN Blood Glucose LESS THAN 70 milliGRAM(s)/deciliter  glucagon  Injectable 1 milliGRAM(s) IntraMuscular once PRN Glucose LESS THAN 70 milligrams/deciliter  melatonin 3 milliGRAM(s) Oral at bedtime PRN Insomnia      Allergies: No Known Allergies    FAMILY HISTORY: No pertinent family history in first degree relatives    *SOCIAL HISTORY: presents today with daughter and other female.    Vital Signs Last 24 Hrs  T(C): 36.6 (25 Sep 2017 05:32), Max: 36.8 (24 Sep 2017 12:05)  T(F): 97.9 (25 Sep 2017 05:32), Max: 98.2 (24 Sep 2017 12:05)  HR: 72 (25 Sep 2017 05:32) (72 - 92)  BP: 117/68 (25 Sep 2017 05:32) (115/75 - 131/69)  BP(mean): --  RR: 18 (25 Sep 2017 05:32) (18 - 19)  SpO2: 98% (25 Sep 2017 05:32) (98% - 100%)    EOE:  no swelling; no asymmetry; no trismus; no LAD; no tenderness to palpation.    IOE:  no palatal erythema noted; ulcer healing well (completely mucosalized); generalized maxillary tenderness without any clinical pathoses; tongue is fissured and mildly geographic but is otherwise WNLs without heme observed.    LABS:                        12.7   12.47 )-----------( 190      ( 25 Sep 2017 06:00 )             39.6     09-25    139  |  101  |  25<H>  ----------------------------<  118<H>  4.0   |  26  |  0.59    Ca    8.9      25 Sep 2017 06:00  Phos  3.4     09-25  Mg     2.0     09-25      WBC Count: 12.47 K/uL <H> [3.8 - 10.5] (09-25 @ 06:00)  Platelet Count - Automated: 190 K/uL [150 - 400] (09-25 @ 06:00)  WBC Count: 13.17 K/uL <H> [3.8 - 10.5] (09-24 @ 05:30)  Platelet Count - Automated: 181 K/uL [150 - 400] (09-24 @ 05:30)  WBC Count: 12.99 K/uL <H> [3.8 - 10.5] (09-23 @ 05:35)  Platelet Count - Automated: 171 K/uL [150 - 400] (09-23 @ 05:35)        *DENTAL RADIOGRAPHS: none indicated.    ASSESSMENT: patient responding well to clotrimazole lozenges and ulcer is healing well; tongue is clinically free of any acute pathoses.    PROCEDURE:  Verbal consent given.   EOE.  IOE.    RECOMMENDATIONS:  1) Continue oral care.  2) Use of Polydent for denture hygiene.  3) Dental F/U with outpatient dentist for comprehensive dental care.   4) If any exacerbation of symptoms, page dental.    Chapin Alvarez, DMD; [id number].

## 2017-09-25 NOTE — PROGRESS NOTE ADULT - PROBLEM SELECTOR PLAN 3
-endo following   -uncontrolled DM-2 with A1c 10.8%  -insulin regimen per endo, glycemic control better on lantus 18 u hs and humalog 14 u ac, monitor FS,   - pt on prednisone ,contributing to the hyperglycemia

## 2017-09-25 NOTE — PROGRESS NOTE ADULT - SUBJECTIVE AND OBJECTIVE BOX
Follow Up:      In[place] History/ROS:Patient is a 62y old  Female who presents with a chief complaint of fluctuating sugars (20 Sep 2017 12:22)  '  Still has cough but overall feels better.     No fever.         Allergies    No Known Allergies    Intolerances        ANTIMICROBIALS:  clotrimazole Lozenge 1 five times a day  levoFLOXacin  Tablet 500 every 24 hours      OTHER MEDS:  aspirin  chewable 162 milliGRAM(s) Oral daily  heparin  Injectable 5000 Unit(s) SubCutaneous every 12 hours  insulin lispro (HumaLOG) corrective regimen sliding scale   SubCutaneous at bedtime  dextrose 5%. 1000 milliLiter(s) IV Continuous <Continuous>  dextrose Gel 1 Dose(s) Oral once PRN  dextrose 50% Injectable 12.5 Gram(s) IV Push once  dextrose 50% Injectable 25 Gram(s) IV Push once  dextrose 50% Injectable 25 Gram(s) IV Push once  glucagon  Injectable 1 milliGRAM(s) IntraMuscular once PRN  dextrose 5%. 1000 milliLiter(s) IV Continuous <Continuous>  dextrose Gel 1 Dose(s) Oral once PRN  dextrose 50% Injectable 25 Gram(s) IV Push once  glucagon  Injectable 1 milliGRAM(s) IntraMuscular once PRN  buDESOnide 160 MICROgram(s)/formoterol 4.5 MICROgram(s) Inhaler 2 Puff(s) Inhalation two times a day  montelukast 10 milliGRAM(s) Oral at bedtime  insulin lispro (HumaLOG) corrective regimen sliding scale   SubCutaneous three times a day before meals  ALBUTerol/ipratropium for Nebulization 3 milliLiter(s) Nebulizer every 6 hours  predniSONE   Tablet 40 milliGRAM(s) Oral daily  melatonin 3 milliGRAM(s) Oral at bedtime PRN  ALBUTerol    90 MICROgram(s) HFA Inhaler 2 Puff(s) Inhalation every 6 hours PRN      Vital Signs Last 24 Hrs  T(C): 36.7 (25 Sep 2017 15:17), Max: 36.7 (24 Sep 2017 22:43)  T(F): 98 (25 Sep 2017 15:17), Max: 98 (24 Sep 2017 22:43)  HR: 83 (25 Sep 2017 15:17) (72 - 86)  BP: 121/73 (25 Sep 2017 15:17) (115/75 - 121/73)  BP(mean): --  RR: 18 (25 Sep 2017 15:17) (18 - 18)  SpO2: 97% (25 Sep 2017 15:17) (97% - 100%)    PHYSICAL EXAM:  General: [x ] non-toxic  HEAD/EYES: [x ] PERRL [ ] white sclera [ ] icterus  ENT:  [ ] normal [x ] supple [ ] thrush [ ] pharyngeal exudate  Cardiovascular:   [ ] murmur [ x] normal [ ] PPM/AICD  Respiratory:  [ x] course bs bilaterally  GI:  [x ] soft, non-tender, normal bowel sounds  :  [ ] francisco [ ] no CVA tenderness   Musculoskeletal:  [ ] no synovitis  Neurologic:  [x ] non-focal exam   Skin:  [x ] no rash  Lymph: [ ] no lymphadenopathy  Psychiatric:  [ ] appropriate affect [x ] alert & oriented  Lines:  [ x] no phlebitis [ ] central line                                12.7   12.47 )-----------( 190      ( 25 Sep 2017 06:00 )             39.6       09-25    139  |  101  |  25<H>  ----------------------------<  118<H>  4.0   |  26  |  0.59    Ca    8.9      25 Sep 2017 06:00  Phos  3.4     09-25  Mg     2.0     09-25            MICROBIOLOGY:    RADIOLOGY:

## 2017-09-25 NOTE — PROGRESS NOTE ADULT - SUBJECTIVE AND OBJECTIVE BOX
Patient is a 62y old  Female who presents with a chief complaint of fluctuating sugars (20 Sep 2017 12:22)      SUBJECTIVE / OVERNIGHT EVENTS: patient seen and examined by bedside, c/o cough with phlegm, occasional SOB , denies fever and chills, chest pain and palpitation       MEDICATIONS  (STANDING):  aspirin  chewable 162 milliGRAM(s) Oral daily  heparin  Injectable 5000 Unit(s) SubCutaneous every 12 hours  insulin lispro (HumaLOG) corrective regimen sliding scale   SubCutaneous at bedtime  dextrose 5%. 1000 milliLiter(s) (50 mL/Hr) IV Continuous <Continuous>  dextrose 50% Injectable 12.5 Gram(s) IV Push once  dextrose 50% Injectable 25 Gram(s) IV Push once  dextrose 50% Injectable 25 Gram(s) IV Push once  dextrose 5%. 1000 milliLiter(s) (50 mL/Hr) IV Continuous <Continuous>  dextrose 50% Injectable 25 Gram(s) IV Push once  buDESOnide 160 MICROgram(s)/formoterol 4.5 MICROgram(s) Inhaler 2 Puff(s) Inhalation two times a day  montelukast 10 milliGRAM(s) Oral at bedtime  insulin lispro (HumaLOG) corrective regimen sliding scale   SubCutaneous three times a day before meals  clotrimazole Lozenge 1 Lozenge Oral five times a day  ALBUTerol/ipratropium for Nebulization 3 milliLiter(s) Nebulizer every 6 hours  predniSONE   Tablet 40 milliGRAM(s) Oral daily  insulin lispro Injectable (HumaLOG) 14 Unit(s) SubCutaneous three times a day before meals  insulin glargine Injectable (LANTUS) 18 Unit(s) SubCutaneous at bedtime  levoFLOXacin  Tablet 500 milliGRAM(s) Oral every 24 hours    MEDICATIONS  (PRN):  dextrose Gel 1 Dose(s) Oral once PRN Blood Glucose LESS THAN 70 milliGRAM(s)/deciliter  glucagon  Injectable 1 milliGRAM(s) IntraMuscular once PRN Glucose LESS THAN 70 milligrams/deciliter  ALBUTerol    90 MICROgram(s) HFA Inhaler 2 Puff(s) Inhalation every 6 hours PRN Shortness of Breath and/or Wheezing  dextrose Gel 1 Dose(s) Oral once PRN Blood Glucose LESS THAN 70 milliGRAM(s)/deciliter  glucagon  Injectable 1 milliGRAM(s) IntraMuscular once PRN Glucose LESS THAN 70 milligrams/deciliter  melatonin 3 milliGRAM(s) Oral at bedtime PRN Insomnia      Vital Signs Last 24 Hrs  T(C): 36.6 (25 Sep 2017 05:32), Max: 36.7 (24 Sep 2017 22:43)  T(F): 97.9 (25 Sep 2017 05:32), Max: 98 (24 Sep 2017 22:43)  HR: 81 (25 Sep 2017 10:22) (72 - 86)  BP: 117/68 (25 Sep 2017 05:32) (115/75 - 117/68)  BP(mean): --  RR: 18 (25 Sep 2017 05:32) (18 - 18)  SpO2: 98% (25 Sep 2017 05:32) (98% - 100%)  CAPILLARY BLOOD GLUCOSE  256 (25 Sep 2017 12:23)  202 (25 Sep 2017 08:35)  109 (24 Sep 2017 22:41)  112 (24 Sep 2017 16:47)        I&O's Summary      PHYSICAL EXAM:  GENERAL: NAD, well-developed  HEAD:  Atraumatic, Normocephalic  EYES: EOMI, PERRLA, conjunctiva and sclera clear  NECK: Supple, No JVD  CHEST/LUNG: diffuse expiratory wheezing. breathing non labored   HEART: Regular rate and rhythm; No murmurs, rubs, or gallops  ABDOMEN: Soft, Nontender, Nondistended; Bowel sounds present  EXTREMITIES:  No clubbing, cyanosis, or edema  PSYCH: AAOx3  NEUROLOGY: non-focal  SKIN: No rashes or lesions    LABS:                        12.7   12.47 )-----------( 190      ( 25 Sep 2017 06:00 )             39.6     09-25    139  |  101  |  25<H>  ----------------------------<  118<H>  4.0   |  26  |  0.59    Ca    8.9      25 Sep 2017 06:00  Phos  3.4     09-25  Mg     2.0     09-25                RADIOLOGY & ADDITIONAL TESTS:    Imaging Personally Reviewed:    Consultant(s) Notes Reviewed:      Care Discussed with Consultants/Other Providers:

## 2017-09-25 NOTE — PROGRESS NOTE ADULT - PROBLEM SELECTOR PLAN 1
target bg 100-180 mg/dl  -increased lantus increase to 22 u qHS  -increased humalog to 16 units sq tid ac  -c/w moderate correctional scales, ac/hs  -monitor steroids for taper, insulin may need to be adjusted if steroids decrease to prevent hypoglycemia     DC plan: basal insulin dose TBD plus metformin 1000 mg BID.  Likely Basaglar.  Please send prescription to pharmacy for insurance verification/coverage of insulin., Dose to be determined at dc.  Based on steroid plan.

## 2017-09-26 DIAGNOSIS — L29.8 OTHER PRURITUS: ICD-10-CM

## 2017-09-26 DIAGNOSIS — K13.70 UNSPECIFIED LESIONS OF ORAL MUCOSA: ICD-10-CM

## 2017-09-26 LAB
A NIGER AB FLD QL: SIGNIFICANT CHANGE UP
BASOPHILS # BLD AUTO: 0.07 K/UL — SIGNIFICANT CHANGE UP (ref 0–0.2)
BASOPHILS NFR BLD AUTO: 0.6 % — SIGNIFICANT CHANGE UP (ref 0–2)
BUN SERPL-MCNC: 25 MG/DL — HIGH (ref 7–23)
CALCIUM SERPL-MCNC: 9.2 MG/DL — SIGNIFICANT CHANGE UP (ref 8.4–10.5)
CHLORIDE SERPL-SCNC: 102 MMOL/L — SIGNIFICANT CHANGE UP (ref 98–107)
CO2 SERPL-SCNC: 25 MMOL/L — SIGNIFICANT CHANGE UP (ref 22–31)
CREAT SERPL-MCNC: 0.65 MG/DL — SIGNIFICANT CHANGE UP (ref 0.5–1.3)
EOSINOPHIL # BLD AUTO: 0.17 K/UL — SIGNIFICANT CHANGE UP (ref 0–0.5)
EOSINOPHIL NFR BLD AUTO: 1.5 % — SIGNIFICANT CHANGE UP (ref 0–6)
GLUCOSE SERPL-MCNC: 171 MG/DL — HIGH (ref 70–99)
HCT VFR BLD CALC: 37.4 % — SIGNIFICANT CHANGE UP (ref 34.5–45)
HGB BLD-MCNC: 11.8 G/DL — SIGNIFICANT CHANGE UP (ref 11.5–15.5)
IMM GRANULOCYTES # BLD AUTO: 0.55 # — SIGNIFICANT CHANGE UP
IMM GRANULOCYTES NFR BLD AUTO: 5 % — HIGH (ref 0–1.5)
LYMPHOCYTES # BLD AUTO: 3.78 K/UL — HIGH (ref 1–3.3)
LYMPHOCYTES # BLD AUTO: 34.5 % — SIGNIFICANT CHANGE UP (ref 13–44)
MAGNESIUM SERPL-MCNC: 1.9 MG/DL — SIGNIFICANT CHANGE UP (ref 1.6–2.6)
MCHC RBC-ENTMCNC: 26.5 PG — LOW (ref 27–34)
MCHC RBC-ENTMCNC: 31.6 % — LOW (ref 32–36)
MCV RBC AUTO: 83.9 FL — SIGNIFICANT CHANGE UP (ref 80–100)
MONOCYTES # BLD AUTO: 0.72 K/UL — SIGNIFICANT CHANGE UP (ref 0–0.9)
MONOCYTES NFR BLD AUTO: 6.6 % — SIGNIFICANT CHANGE UP (ref 2–14)
NEUTROPHILS # BLD AUTO: 5.68 K/UL — SIGNIFICANT CHANGE UP (ref 1.8–7.4)
NEUTROPHILS NFR BLD AUTO: 51.8 % — SIGNIFICANT CHANGE UP (ref 43–77)
NRBC # FLD: 0 — SIGNIFICANT CHANGE UP
PHOSPHATE SERPL-MCNC: 3.9 MG/DL — SIGNIFICANT CHANGE UP (ref 2.5–4.5)
PLATELET # BLD AUTO: 180 K/UL — SIGNIFICANT CHANGE UP (ref 150–400)
PMV BLD: 11.1 FL — SIGNIFICANT CHANGE UP (ref 7–13)
POTASSIUM SERPL-MCNC: 4 MMOL/L — SIGNIFICANT CHANGE UP (ref 3.5–5.3)
POTASSIUM SERPL-SCNC: 4 MMOL/L — SIGNIFICANT CHANGE UP (ref 3.5–5.3)
RBC # BLD: 4.46 M/UL — SIGNIFICANT CHANGE UP (ref 3.8–5.2)
RBC # FLD: 15.5 % — HIGH (ref 10.3–14.5)
SODIUM SERPL-SCNC: 138 MMOL/L — SIGNIFICANT CHANGE UP (ref 135–145)
WBC # BLD: 10.97 K/UL — HIGH (ref 3.8–10.5)
WBC # FLD AUTO: 10.97 K/UL — HIGH (ref 3.8–10.5)

## 2017-09-26 PROCEDURE — 99233 SBSQ HOSP IP/OBS HIGH 50: CPT

## 2017-09-26 RX ORDER — INSULIN GLARGINE 100 [IU]/ML
25 INJECTION, SOLUTION SUBCUTANEOUS AT BEDTIME
Qty: 0 | Refills: 0 | Status: DISCONTINUED | OUTPATIENT
Start: 2017-09-26 | End: 2017-09-29

## 2017-09-26 RX ORDER — INSULIN LISPRO 100/ML
19 VIAL (ML) SUBCUTANEOUS
Qty: 0 | Refills: 0 | Status: DISCONTINUED | OUTPATIENT
Start: 2017-09-26 | End: 2017-09-27

## 2017-09-26 RX ORDER — DOCUSATE SODIUM 100 MG
100 CAPSULE ORAL
Qty: 0 | Refills: 0 | Status: DISCONTINUED | OUTPATIENT
Start: 2017-09-26 | End: 2017-09-29

## 2017-09-26 RX ORDER — POLYETHYLENE GLYCOL 3350 17 G/17G
17 POWDER, FOR SOLUTION ORAL ONCE
Qty: 0 | Refills: 0 | Status: COMPLETED | OUTPATIENT
Start: 2017-09-26 | End: 2017-09-26

## 2017-09-26 RX ORDER — SENNA PLUS 8.6 MG/1
1 TABLET ORAL DAILY
Qty: 0 | Refills: 0 | Status: DISCONTINUED | OUTPATIENT
Start: 2017-09-26 | End: 2017-09-29

## 2017-09-26 RX ORDER — NYSTATIN CREAM 100000 [USP'U]/G
1 CREAM TOPICAL DAILY
Qty: 0 | Refills: 0 | Status: DISCONTINUED | OUTPATIENT
Start: 2017-09-26 | End: 2017-09-29

## 2017-09-26 RX ADMIN — POLYETHYLENE GLYCOL 3350 17 GRAM(S): 17 POWDER, FOR SOLUTION ORAL at 22:32

## 2017-09-26 RX ADMIN — TUBERCULIN PURIFIED PROTEIN DERIVATIVE 5 UNIT(S): 5 INJECTION, SOLUTION INTRADERMAL at 14:42

## 2017-09-26 RX ADMIN — Medication 1 LOZENGE: at 17:48

## 2017-09-26 RX ADMIN — Medication 3 MILLIGRAM(S): at 22:32

## 2017-09-26 RX ADMIN — Medication 6: at 12:45

## 2017-09-26 RX ADMIN — Medication 1 LOZENGE: at 22:32

## 2017-09-26 RX ADMIN — INSULIN GLARGINE 25 UNIT(S): 100 INJECTION, SOLUTION SUBCUTANEOUS at 22:32

## 2017-09-26 RX ADMIN — BUDESONIDE AND FORMOTEROL FUMARATE DIHYDRATE 2 PUFF(S): 160; 4.5 AEROSOL RESPIRATORY (INHALATION) at 10:17

## 2017-09-26 RX ADMIN — Medication 4: at 08:43

## 2017-09-26 RX ADMIN — BUDESONIDE AND FORMOTEROL FUMARATE DIHYDRATE 2 PUFF(S): 160; 4.5 AEROSOL RESPIRATORY (INHALATION) at 22:33

## 2017-09-26 RX ADMIN — Medication 162 MILLIGRAM(S): at 12:46

## 2017-09-26 RX ADMIN — Medication 100 MILLIGRAM(S): at 22:32

## 2017-09-26 RX ADMIN — SENNA PLUS 1 TABLET(S): 8.6 TABLET ORAL at 22:32

## 2017-09-26 RX ADMIN — Medication 19 UNIT(S): at 12:45

## 2017-09-26 RX ADMIN — Medication 3 MILLILITER(S): at 15:58

## 2017-09-26 RX ADMIN — Medication 3 MILLILITER(S): at 03:42

## 2017-09-26 RX ADMIN — Medication 1 LOZENGE: at 12:46

## 2017-09-26 RX ADMIN — Medication 40 MILLIGRAM(S): at 05:08

## 2017-09-26 RX ADMIN — Medication 3 MILLILITER(S): at 11:08

## 2017-09-26 RX ADMIN — Medication 16 UNIT(S): at 08:43

## 2017-09-26 RX ADMIN — NYSTATIN CREAM 1 APPLICATION(S): 100000 CREAM TOPICAL at 17:49

## 2017-09-26 RX ADMIN — HEPARIN SODIUM 5000 UNIT(S): 5000 INJECTION INTRAVENOUS; SUBCUTANEOUS at 05:08

## 2017-09-26 RX ADMIN — Medication 1 LOZENGE: at 08:44

## 2017-09-26 RX ADMIN — MONTELUKAST 10 MILLIGRAM(S): 4 TABLET, CHEWABLE ORAL at 22:33

## 2017-09-26 RX ADMIN — HEPARIN SODIUM 5000 UNIT(S): 5000 INJECTION INTRAVENOUS; SUBCUTANEOUS at 17:48

## 2017-09-26 NOTE — CHART NOTE - NSCHARTNOTEFT_GEN_A_CORE
NUTRITION FOLLOW-UP:  Pt.  reported by nursing w consistently good PO intake and tolerating mechanical soft diet consistency.  No stated/noted nausea/vomiting/diarrhea/constipation or issues chewing/swallowing at this time.        Weight:  None current--> discussed w RN.      Edema:  None noted.    Skin:  No noted pressure ulcers.      Pertinent Medications: MEDICATIONS  (STANDING):  aspirin  chewable 162 milliGRAM(s) Oral daily  heparin  Injectable 5000 Unit(s) SubCutaneous every 12 hours  insulin lispro (HumaLOG) corrective regimen sliding scale   SubCutaneous at bedtime  dextrose 5%. 1000 milliLiter(s) (50 mL/Hr) IV Continuous <Continuous>  dextrose 50% Injectable 12.5 Gram(s) IV Push once  dextrose 50% Injectable 25 Gram(s) IV Push once  dextrose 50% Injectable 25 Gram(s) IV Push once  dextrose 5%. 1000 milliLiter(s) (50 mL/Hr) IV Continuous <Continuous>  dextrose 50% Injectable 25 Gram(s) IV Push once  buDESOnide 160 MICROgram(s)/formoterol 4.5 MICROgram(s) Inhaler 2 Puff(s) Inhalation two times a day  montelukast 10 milliGRAM(s) Oral at bedtime  insulin lispro (HumaLOG) corrective regimen sliding scale   SubCutaneous three times a day before meals  clotrimazole Lozenge 1 Lozenge Oral five times a day  ALBUTerol/ipratropium for Nebulization 3 milliLiter(s) Nebulizer every 6 hours  predniSONE   Tablet 40 milliGRAM(s) Oral daily  levoFLOXacin  Tablet 500 milliGRAM(s) Oral every 24 hours  PPD  5 Tuberculin Unit(s) Injectable 5 Unit(s) IntraDermal once  insulin lispro Injectable (HumaLOG) 19 Unit(s) SubCutaneous three times a day before meals  insulin glargine Injectable (LANTUS) 25 Unit(s) SubCutaneous at bedtime  nystatin Powder 1 Application(s) Topical daily    MEDICATIONS  (PRN):  dextrose Gel 1 Dose(s) Oral once PRN Blood Glucose LESS THAN 70 milliGRAM(s)/deciliter  glucagon  Injectable 1 milliGRAM(s) IntraMuscular once PRN Glucose LESS THAN 70 milligrams/deciliter  dextrose Gel 1 Dose(s) Oral once PRN Blood Glucose LESS THAN 70 milliGRAM(s)/deciliter  glucagon  Injectable 1 milliGRAM(s) IntraMuscular once PRN Glucose LESS THAN 70 milligrams/deciliter  melatonin 3 milliGRAM(s) Oral at bedtime PRN Insomnia  ALBUTerol    90 MICROgram(s) HFA Inhaler 2 Puff(s) Inhalation every 6 hours PRN Shortness of Breath and/or Wheezing    Pertinent Labs:  09-26 Na138 mmol/L Glu 171 mg/dL<H> K+ 4.0 mmol/L Cr  0.65 mg/dL BUN 25 mg/dL<H> Phos 3.9 mg/dL Alb n/a   PAB n/a         FINGER STICKS:   CAPILLARY BLOOD GLUCOSE  270 (26 Sep 2017 12:15)  204 (26 Sep 2017 08:40)  261 (25 Sep 2017 22:50)  119 (25 Sep 2017 16:31)            Current Diet:  Mechanical soft, Consistent Carbohydrate w evening snack, + Glucerna Shake 8oz PO 2x daily             PLAN/RECOMMENDATIONS:    1) Continue current diet.    2) Obtain current & daily weights.  3) RDN remains available and will f/u PRN.          Candie Valenzuela RDN, CDN pager [phone]

## 2017-09-26 NOTE — CHART NOTE - NSCHARTNOTEFT_GEN_A_CORE
62 yr F with uncontrolled T2DM A1C 10.8% admitted for asthma/bronchiectasis exacerbation, R/O TB.  Dm2 exacerbated by steroids.      Current meds  insulin lispro (HumaLOG) corrective regimen sliding scale   SubCutaneous at bedtime  insulin lispro (HumaLOG) corrective regimen sliding scale   SubCutaneous three times a day before meals  predniSONE   Tablet 40 milliGRAM(s) Oral daily  insulin lispro Injectable (HumaLOG) 16 Unit(s) SubCutaneous three times a day before meals  insulin glargine Injectable (LANTUS) 22 Unit(s) SubCutaneous at bedtime    Vital Signs Last 24 Hrs  T(C): 36.6 (26 Sep 2017 12:47), Max: 36.7 (25 Sep 2017 15:17)  T(F): 97.8 (26 Sep 2017 12:47), Max: 98.1 (26 Sep 2017 05:07)  HR: 86 (26 Sep 2017 12:47) (70 - 87)  BP: 129/77 (26 Sep 2017 12:47) (119/71 - 129/77)  BP(mean): --  RR: 18 (26 Sep 2017 12:47) (18 - 18)  SpO2: 98% (26 Sep 2017 12:47) (97% - 98%)    CAPILLARY BLOOD GLUCOSE  270 (26 Sep 2017 12:15)  204 (26 Sep 2017 08:40)  261 (25 Sep 2017 22:50)  119 (25 Sep 2017 16:31)      Hemoglobin A1C, Whole Blood (09.18.17 @ 11:50)    Hemoglobin A1C, Whole Blood: 10.8:    As per RN, patient is eating.  has good appetite.      Patient and family were taught insulinpen injections and DM edu by CDE.    Plan:   Increase lantus to 25 units sq qhs  increase humalog to 19 units sq tid ac  c/w humalog moderate correction scale ac/hs    Final DC plan tbd.

## 2017-09-26 NOTE — PROGRESS NOTE ADULT - PROBLEM SELECTOR PLAN 1
-still wheezing, cont prednisone 40 mg qd, c/w duoneb to q6h atc,   -f/u pulm  -bronchiectasis/asthma exacerbation exacerbated by RSV with superinfection from Stenotrophomonas colonization.  -serum immunoglobulin levels normal  -pending aspergillosis lab  -HIV negative  -sputum cx (9/21): stenotrophomonas resistant to ceftazidime but sensitive to levaquin and bactrim; c/w  levaquin, s/p 3 days of zithromax  -c/w bronchodilator duoneb q6h, singulair hs  -cont chest PT

## 2017-09-26 NOTE — CHART NOTE - NSCHARTNOTEFT_GEN_A_CORE
PPD placed in pts left forearm 9/26, wheal formed. Lot Number [id number] expiration date 5/19/19. Please recheck arm in 3 days for induration.

## 2017-09-26 NOTE — PROGRESS NOTE ADULT - SUBJECTIVE AND OBJECTIVE BOX
Patient is a 62y old  Female who presents with a chief complaint of fluctuating sugars (20 Sep 2017 12:22)      SUBJECTIVE / OVERNIGHT EVENTS: patient seen and examined by bedside, still having cough with phlegm, denies fever and chills , SOB, CP, Pt c/o vaginal itching, using OTC steroid +antifungal cream        MEDICATIONS  (STANDING):  aspirin  chewable 162 milliGRAM(s) Oral daily  heparin  Injectable 5000 Unit(s) SubCutaneous every 12 hours  insulin lispro (HumaLOG) corrective regimen sliding scale   SubCutaneous at bedtime  dextrose 5%. 1000 milliLiter(s) (50 mL/Hr) IV Continuous <Continuous>  dextrose 50% Injectable 12.5 Gram(s) IV Push once  dextrose 50% Injectable 25 Gram(s) IV Push once  dextrose 50% Injectable 25 Gram(s) IV Push once  dextrose 5%. 1000 milliLiter(s) (50 mL/Hr) IV Continuous <Continuous>  dextrose 50% Injectable 25 Gram(s) IV Push once  buDESOnide 160 MICROgram(s)/formoterol 4.5 MICROgram(s) Inhaler 2 Puff(s) Inhalation two times a day  montelukast 10 milliGRAM(s) Oral at bedtime  insulin lispro (HumaLOG) corrective regimen sliding scale   SubCutaneous three times a day before meals  clotrimazole Lozenge 1 Lozenge Oral five times a day  ALBUTerol/ipratropium for Nebulization 3 milliLiter(s) Nebulizer every 6 hours  predniSONE   Tablet 40 milliGRAM(s) Oral daily  levoFLOXacin  Tablet 500 milliGRAM(s) Oral every 24 hours  PPD  5 Tuberculin Unit(s) Injectable 5 Unit(s) IntraDermal once  insulin lispro Injectable (HumaLOG) 19 Unit(s) SubCutaneous three times a day before meals  insulin glargine Injectable (LANTUS) 25 Unit(s) SubCutaneous at bedtime  nystatin Powder 1 Application(s) Topical daily    MEDICATIONS  (PRN):  dextrose Gel 1 Dose(s) Oral once PRN Blood Glucose LESS THAN 70 milliGRAM(s)/deciliter  glucagon  Injectable 1 milliGRAM(s) IntraMuscular once PRN Glucose LESS THAN 70 milligrams/deciliter  dextrose Gel 1 Dose(s) Oral once PRN Blood Glucose LESS THAN 70 milliGRAM(s)/deciliter  glucagon  Injectable 1 milliGRAM(s) IntraMuscular once PRN Glucose LESS THAN 70 milligrams/deciliter  melatonin 3 milliGRAM(s) Oral at bedtime PRN Insomnia  ALBUTerol    90 MICROgram(s) HFA Inhaler 2 Puff(s) Inhalation every 6 hours PRN Shortness of Breath and/or Wheezing      Vital Signs Last 24 Hrs  T(C): 36.6 (26 Sep 2017 12:47), Max: 36.7 (25 Sep 2017 15:17)  T(F): 97.8 (26 Sep 2017 12:47), Max: 98.1 (26 Sep 2017 05:07)  HR: 86 (26 Sep 2017 12:47) (70 - 87)  BP: 129/77 (26 Sep 2017 12:47) (119/71 - 129/77)  BP(mean): --  RR: 18 (26 Sep 2017 12:47) (18 - 18)  SpO2: 98% (26 Sep 2017 12:47) (97% - 98%)  CAPILLARY BLOOD GLUCOSE  270 (26 Sep 2017 12:15)  204 (26 Sep 2017 08:40)  261 (25 Sep 2017 22:50)  119 (25 Sep 2017 16:31)        I&O's Summary    PHYSICAL EXAM:  GENERAL: NAD, well-developed  HEAD:  Atraumatic, Normocephalic  EYES: EOMI, PERRLA, conjunctiva and sclera clear  NECK: Supple, No JVD  CHEST/LUNG: diffuse expiratory wheezing. breathing non labored   HEART: Regular rate and rhythm; No murmurs, rubs, or gallops  ABDOMEN: Soft, Nontender, Nondistended; Bowel sounds present  : no evidence of blistering lesion seen  EXTREMITIES:  No clubbing, cyanosis, or edema  PSYCH: AAOx3  NEUROLOGY: non-focal  SKIN: No rashes or lesions      LABS:                        11.8   10.97 )-----------( 180      ( 26 Sep 2017 05:20 )             37.4     09-26    138  |  102  |  25<H>  ----------------------------<  171<H>  4.0   |  25  |  0.65    Ca    9.2      26 Sep 2017 05:20  Phos  3.9     09-26  Mg     1.9     09-26                RADIOLOGY & ADDITIONAL TESTS:    Imaging Personally Reviewed:    Consultant(s) Notes Reviewed:      Care Discussed with Consultants/Other Providers:

## 2017-09-26 NOTE — PROGRESS NOTE ADULT - PROBLEM SELECTOR PLAN 3
-endo following   -uncontrolled DM-2 with A1c 10.8%  -insulin regimen adjusted by  endo,   -monitor FSBS   - pt on prednisone ,contributing to the hyperglycemia

## 2017-09-26 NOTE — PROGRESS NOTE ADULT - PROBLEM SELECTOR PLAN 2
-likely chronic lesion from bronchiectasis  -ruled out for TB with negative AFB x5 but samples sent in short durations  -indeterminate quantiferon gold  -f/u aspergillosis lab  -f/u ID recs, on airborne isolation until definitely ruled out for TB  - pulm and ID f/u noted   -Pulm recommend checking PPD

## 2017-09-27 LAB
BASOPHILS # BLD AUTO: 0.08 K/UL — SIGNIFICANT CHANGE UP (ref 0–0.2)
BASOPHILS NFR BLD AUTO: 0.7 % — SIGNIFICANT CHANGE UP (ref 0–2)
BUN SERPL-MCNC: 22 MG/DL — SIGNIFICANT CHANGE UP (ref 7–23)
CALCIUM SERPL-MCNC: 9 MG/DL — SIGNIFICANT CHANGE UP (ref 8.4–10.5)
CHLORIDE SERPL-SCNC: 101 MMOL/L — SIGNIFICANT CHANGE UP (ref 98–107)
CO2 SERPL-SCNC: 25 MMOL/L — SIGNIFICANT CHANGE UP (ref 22–31)
CREAT SERPL-MCNC: 0.58 MG/DL — SIGNIFICANT CHANGE UP (ref 0.5–1.3)
EOSINOPHIL # BLD AUTO: 0.14 K/UL — SIGNIFICANT CHANGE UP (ref 0–0.5)
EOSINOPHIL NFR BLD AUTO: 1.3 % — SIGNIFICANT CHANGE UP (ref 0–6)
GLUCOSE SERPL-MCNC: 121 MG/DL — HIGH (ref 70–99)
HCT VFR BLD CALC: 36.4 % — SIGNIFICANT CHANGE UP (ref 34.5–45)
HGB BLD-MCNC: 11.9 G/DL — SIGNIFICANT CHANGE UP (ref 11.5–15.5)
IMM GRANULOCYTES # BLD AUTO: 0.48 # — SIGNIFICANT CHANGE UP
IMM GRANULOCYTES NFR BLD AUTO: 4.3 % — HIGH (ref 0–1.5)
LYMPHOCYTES # BLD AUTO: 36.2 % — SIGNIFICANT CHANGE UP (ref 13–44)
LYMPHOCYTES # BLD AUTO: 4.03 K/UL — HIGH (ref 1–3.3)
MAGNESIUM SERPL-MCNC: 2 MG/DL — SIGNIFICANT CHANGE UP (ref 1.6–2.6)
MCHC RBC-ENTMCNC: 26.7 PG — LOW (ref 27–34)
MCHC RBC-ENTMCNC: 32.7 % — SIGNIFICANT CHANGE UP (ref 32–36)
MCV RBC AUTO: 81.6 FL — SIGNIFICANT CHANGE UP (ref 80–100)
MONOCYTES # BLD AUTO: 0.75 K/UL — SIGNIFICANT CHANGE UP (ref 0–0.9)
MONOCYTES NFR BLD AUTO: 6.7 % — SIGNIFICANT CHANGE UP (ref 2–14)
NEUTROPHILS # BLD AUTO: 5.64 K/UL — SIGNIFICANT CHANGE UP (ref 1.8–7.4)
NEUTROPHILS NFR BLD AUTO: 50.8 % — SIGNIFICANT CHANGE UP (ref 43–77)
NRBC # FLD: 0 — SIGNIFICANT CHANGE UP
PHOSPHATE SERPL-MCNC: 3.2 MG/DL — SIGNIFICANT CHANGE UP (ref 2.5–4.5)
PLATELET # BLD AUTO: 202 K/UL — SIGNIFICANT CHANGE UP (ref 150–400)
PMV BLD: 11.5 FL — SIGNIFICANT CHANGE UP (ref 7–13)
POTASSIUM SERPL-MCNC: 3.9 MMOL/L — SIGNIFICANT CHANGE UP (ref 3.5–5.3)
POTASSIUM SERPL-SCNC: 3.9 MMOL/L — SIGNIFICANT CHANGE UP (ref 3.5–5.3)
RBC # BLD: 4.46 M/UL — SIGNIFICANT CHANGE UP (ref 3.8–5.2)
RBC # FLD: 15.6 % — HIGH (ref 10.3–14.5)
SODIUM SERPL-SCNC: 137 MMOL/L — SIGNIFICANT CHANGE UP (ref 135–145)
WBC # BLD: 11.12 K/UL — HIGH (ref 3.8–10.5)
WBC # FLD AUTO: 11.12 K/UL — HIGH (ref 3.8–10.5)

## 2017-09-27 PROCEDURE — 99232 SBSQ HOSP IP/OBS MODERATE 35: CPT | Mod: GC

## 2017-09-27 PROCEDURE — 99233 SBSQ HOSP IP/OBS HIGH 50: CPT

## 2017-09-27 PROCEDURE — 99232 SBSQ HOSP IP/OBS MODERATE 35: CPT

## 2017-09-27 RX ORDER — INSULIN LISPRO 100/ML
17 VIAL (ML) SUBCUTANEOUS
Qty: 0 | Refills: 0 | Status: DISCONTINUED | OUTPATIENT
Start: 2017-09-27 | End: 2017-09-28

## 2017-09-27 RX ORDER — DIPHENHYDRAMINE HCL 50 MG
25 CAPSULE ORAL ONCE
Qty: 0 | Refills: 0 | Status: COMPLETED | OUTPATIENT
Start: 2017-09-27 | End: 2017-09-28

## 2017-09-27 RX ORDER — POLYETHYLENE GLYCOL 3350 17 G/17G
17 POWDER, FOR SOLUTION ORAL DAILY
Qty: 0 | Refills: 0 | Status: DISCONTINUED | OUTPATIENT
Start: 2017-09-27 | End: 2017-09-29

## 2017-09-27 RX ORDER — DIPHENHYDRAMINE HCL 50 MG
25 CAPSULE ORAL ONCE
Qty: 0 | Refills: 0 | Status: COMPLETED | OUTPATIENT
Start: 2017-09-27 | End: 2017-09-27

## 2017-09-27 RX ADMIN — Medication 100 MILLIGRAM(S): at 05:45

## 2017-09-27 RX ADMIN — Medication 40 MILLIGRAM(S): at 05:45

## 2017-09-27 RX ADMIN — Medication 25 MILLIGRAM(S): at 01:07

## 2017-09-27 RX ADMIN — NYSTATIN CREAM 1 APPLICATION(S): 100000 CREAM TOPICAL at 12:57

## 2017-09-27 RX ADMIN — HEPARIN SODIUM 5000 UNIT(S): 5000 INJECTION INTRAVENOUS; SUBCUTANEOUS at 05:44

## 2017-09-27 RX ADMIN — BUDESONIDE AND FORMOTEROL FUMARATE DIHYDRATE 2 PUFF(S): 160; 4.5 AEROSOL RESPIRATORY (INHALATION) at 08:56

## 2017-09-27 RX ADMIN — Medication 100 MILLIGRAM(S): at 17:30

## 2017-09-27 RX ADMIN — Medication 2: at 17:28

## 2017-09-27 RX ADMIN — Medication 1 LOZENGE: at 21:59

## 2017-09-27 RX ADMIN — POLYETHYLENE GLYCOL 3350 17 GRAM(S): 17 POWDER, FOR SOLUTION ORAL at 20:10

## 2017-09-27 RX ADMIN — Medication 1 LOZENGE: at 12:55

## 2017-09-27 RX ADMIN — Medication 162 MILLIGRAM(S): at 12:55

## 2017-09-27 RX ADMIN — Medication 3 MILLIGRAM(S): at 22:00

## 2017-09-27 RX ADMIN — SENNA PLUS 1 TABLET(S): 8.6 TABLET ORAL at 12:55

## 2017-09-27 RX ADMIN — Medication 17 UNIT(S): at 17:28

## 2017-09-27 RX ADMIN — Medication 1 LOZENGE: at 01:07

## 2017-09-27 RX ADMIN — BUDESONIDE AND FORMOTEROL FUMARATE DIHYDRATE 2 PUFF(S): 160; 4.5 AEROSOL RESPIRATORY (INHALATION) at 20:10

## 2017-09-27 RX ADMIN — Medication 1 LOZENGE: at 17:29

## 2017-09-27 RX ADMIN — Medication 2: at 08:55

## 2017-09-27 RX ADMIN — Medication 1 TABLET(S): at 20:10

## 2017-09-27 RX ADMIN — Medication 19 UNIT(S): at 08:55

## 2017-09-27 RX ADMIN — MONTELUKAST 10 MILLIGRAM(S): 4 TABLET, CHEWABLE ORAL at 22:00

## 2017-09-27 RX ADMIN — Medication 6: at 14:25

## 2017-09-27 RX ADMIN — Medication 1 LOZENGE: at 08:56

## 2017-09-27 RX ADMIN — HEPARIN SODIUM 5000 UNIT(S): 5000 INJECTION INTRAVENOUS; SUBCUTANEOUS at 17:30

## 2017-09-27 NOTE — PROGRESS NOTE ADULT - SUBJECTIVE AND OBJECTIVE BOX
Chief Complaint: Hyperglycemia on steroids    History: Pt denies any complaints, no n/v, tolerates po.  FS high in 200s.    MEDICATIONS  (STANDING):  aspirin  chewable 162 milliGRAM(s) Oral daily  heparin  Injectable 5000 Unit(s) SubCutaneous every 12 hours  insulin lispro (HumaLOG) corrective regimen sliding scale   SubCutaneous at bedtime  dextrose 5%. 1000 milliLiter(s) (50 mL/Hr) IV Continuous <Continuous>  dextrose 50% Injectable 12.5 Gram(s) IV Push once  dextrose 50% Injectable 25 Gram(s) IV Push once  dextrose 50% Injectable 25 Gram(s) IV Push once  dextrose 5%. 1000 milliLiter(s) (50 mL/Hr) IV Continuous <Continuous>  dextrose 50% Injectable 25 Gram(s) IV Push once  buDESOnide 160 MICROgram(s)/formoterol 4.5 MICROgram(s) Inhaler 2 Puff(s) Inhalation two times a day  montelukast 10 milliGRAM(s) Oral at bedtime  insulin lispro (HumaLOG) corrective regimen sliding scale   SubCutaneous three times a day before meals  clotrimazole Lozenge 1 Lozenge Oral five times a day  ALBUTerol/ipratropium for Nebulization 3 milliLiter(s) Nebulizer every 6 hours  predniSONE   Tablet 40 milliGRAM(s) Oral daily  insulin lispro Injectable (HumaLOG) 19 Unit(s) SubCutaneous three times a day before meals  insulin glargine Injectable (LANTUS) 25 Unit(s) SubCutaneous at bedtime    MEDICATIONS  (PRN):  dextrose Gel 1 Dose(s) Oral once PRN Blood Glucose LESS THAN 70 milliGRAM(s)/deciliter  glucagon  Injectable 1 milliGRAM(s) IntraMuscular once PRN Glucose LESS THAN 70 milligrams/deciliter  ALBUTerol    90 MICROgram(s) HFA Inhaler 2 Puff(s) Inhalation every 6 hours PRN Shortness of Breath and/or Wheezing  dextrose Gel 1 Dose(s) Oral once PRN Blood Glucose LESS THAN 70 milliGRAM(s)/deciliter  glucagon  Injectable 1 milliGRAM(s) IntraMuscular once PRN Glucose LESS THAN 70 milligrams/deciliter      Allergies    No Known Allergies      Review of Systems:  Constitutional: No fever  Cardiovascular: No chest pain, palpitations  Respiratory: No SOB, no cough  GI: No nausea, vomiting, abdominal pain      ALL OTHER SYSTEMS REVIEWED AND NEGATIVE    PHYSICAL EXAM:  Vital Signs Last 24 Hrs  T(C): 36.6 (27 Sep 2017 12:32), Max: 36.8 (26 Sep 2017 22:31)  T(F): 97.9 (27 Sep 2017 12:32), Max: 98.2 (26 Sep 2017 22:31)  HR: 84 (27 Sep 2017 12:32) (75 - 84)  BP: 104/67 (27 Sep 2017 12:32) (104/67 - 130/79)  BP(mean): --  RR: 18 (27 Sep 2017 12:32) (18 - 18)  SpO2: 100% (27 Sep 2017 12:32) (97% - 100%)  GENERAL: NAD  RESPIRATORY: Clear to auscultation bilaterally; No rales, rhonchi, wheezing, or rubs  CARDIOVASCULAR: Regular rate and rhythm; No murmurs; no peripheral edema  GI: Soft, nontender, non distended, normal bowel sounds  SKIN: Dry, intact, No rashes or lesions    CAPILLARY BLOOD GLUCOSE  194 (27 Sep 2017 16:32)  258 (27 Sep 2017 14:19)  90 (27 Sep 2017 11:56)  198 (27 Sep 2017 08:55)  181 (26 Sep 2017 22:26)    256 (25 Sep 2017 12:23)  202 (25 Sep 2017 08:35)  109 (24 Sep 2017 22:41)  112 (24 Sep 2017 16:47)    276 (09-23 @ 12:29)  281 (09-23 @ 08:44)  335 (09-22 @ 22:02)  223 (09-22 @ 16:49)  372 (09-22 @ 12:25)  273 (09-22 @ 08:31)    09-27    137  |  101  |  22  ----------------------------<  121<H>  3.9   |  25  |  0.58    Ca    9.0      27 Sep 2017 05:55  Phos  3.2     09-27  Mg     2.0     09-27          Thyroid Function Tests:  09-19 @ 06:00 TSH 1.93 FreeT4 -- T3 -- Anti TPO -- Anti Thyroglobulin Ab -- TSI --      Hemoglobin A1C, Whole Blood: 10.8 % <H> [4.0 - 5.6] (09-18-17 @ 11:50)

## 2017-09-27 NOTE — PROGRESS NOTE ADULT - SUBJECTIVE AND OBJECTIVE BOX
Patient is a 62y old  Female who presents with a chief complaint of fluctuating sugars (20 Sep 2017 12:22)      SUBJECTIVE / OVERNIGHT EVENTS: patient seen and examined by bedside, feeling better today, had shower, still  having productive cough , denies fever chills Cp, palpitation, N/V/D. Pt c/o constipation       MEDICATIONS  (STANDING):  aspirin  chewable 162 milliGRAM(s) Oral daily  heparin  Injectable 5000 Unit(s) SubCutaneous every 12 hours  insulin lispro (HumaLOG) corrective regimen sliding scale   SubCutaneous at bedtime  dextrose 5%. 1000 milliLiter(s) (50 mL/Hr) IV Continuous <Continuous>  dextrose 50% Injectable 12.5 Gram(s) IV Push once  dextrose 50% Injectable 25 Gram(s) IV Push once  dextrose 50% Injectable 25 Gram(s) IV Push once  dextrose 5%. 1000 milliLiter(s) (50 mL/Hr) IV Continuous <Continuous>  dextrose 50% Injectable 25 Gram(s) IV Push once  buDESOnide 160 MICROgram(s)/formoterol 4.5 MICROgram(s) Inhaler 2 Puff(s) Inhalation two times a day  montelukast 10 milliGRAM(s) Oral at bedtime  insulin lispro (HumaLOG) corrective regimen sliding scale   SubCutaneous three times a day before meals  clotrimazole Lozenge 1 Lozenge Oral five times a day  predniSONE   Tablet 40 milliGRAM(s) Oral daily  insulin glargine Injectable (LANTUS) 25 Unit(s) SubCutaneous at bedtime  nystatin Powder 1 Application(s) Topical daily  senna 1 Tablet(s) Oral daily  docusate sodium 100 milliGRAM(s) Oral two times a day  insulin lispro Injectable (HumaLOG) 17 Unit(s) SubCutaneous three times a day before meals  trimethoprim  160 mG/sulfamethoxazole 800 mG 1 Tablet(s) Oral two times a day    MEDICATIONS  (PRN):  dextrose Gel 1 Dose(s) Oral once PRN Blood Glucose LESS THAN 70 milliGRAM(s)/deciliter  glucagon  Injectable 1 milliGRAM(s) IntraMuscular once PRN Glucose LESS THAN 70 milligrams/deciliter  dextrose Gel 1 Dose(s) Oral once PRN Blood Glucose LESS THAN 70 milliGRAM(s)/deciliter  glucagon  Injectable 1 milliGRAM(s) IntraMuscular once PRN Glucose LESS THAN 70 milligrams/deciliter  melatonin 3 milliGRAM(s) Oral at bedtime PRN Insomnia  ALBUTerol    90 MICROgram(s) HFA Inhaler 2 Puff(s) Inhalation every 6 hours PRN Shortness of Breath and/or Wheezing      Vital Signs Last 24 Hrs  T(C): 36.6 (27 Sep 2017 12:32), Max: 36.8 (26 Sep 2017 22:31)  T(F): 97.9 (27 Sep 2017 12:32), Max: 98.2 (26 Sep 2017 22:31)  HR: 84 (27 Sep 2017 12:32) (75 - 84)  BP: 104/67 (27 Sep 2017 12:32) (104/67 - 130/79)  BP(mean): --  RR: 18 (27 Sep 2017 12:32) (18 - 18)  SpO2: 100% (27 Sep 2017 12:32) (97% - 100%)  CAPILLARY BLOOD GLUCOSE  194 (27 Sep 2017 16:32)  258 (27 Sep 2017 14:19)  90 (27 Sep 2017 11:56)  198 (27 Sep 2017 08:55)  181 (26 Sep 2017 22:26)        I&O's Summary      PHYSICAL EXAM:  GENERAL: NAD, well-developed  HEAD:  Atraumatic, Normocephalic  EYES: EOMI, PERRLA, conjunctiva and sclera clear  NECK: Supple, No JVD  CHEST/LUNG: Clear to auscultation bilaterally; No wheeze  HEART: Regular rate and rhythm; No murmurs, rubs, or gallops  ABDOMEN: Soft, Nontender, Nondistended; Bowel sounds present  EXTREMITIES:  2+ Peripheral Pulses, No clubbing, cyanosis, or edema  PSYCH: AAOx3  NEUROLOGY: non-focal  SKIN: No rashes or lesions    LABS:                        11.9   11.12 )-----------( 202      ( 27 Sep 2017 05:55 )             36.4     09-27    137  |  101  |  22  ----------------------------<  121<H>  3.9   |  25  |  0.58    Ca    9.0      27 Sep 2017 05:55  Phos  3.2     09-27  Mg     2.0     09-27        Culture - Acid Fast - Sputum w/Smear . (09.24.17 @ 10:28)    Culture - Acid Fast Smear Concentrated:   AFB SMEAR= NO ACID FAST BACILLI SEEN    Specimen Source: SPUTUM    Culture - Acid Fast - Sputum w/Smear:   ***** CRITICAL RESULT *****  PERSON CALLED / READ-BACK: LUZ ROJAS/YES  DATE / TIME CALLED: 09/27/17 0831  CALLED BY: AMBREEN DAN  RESULT CALLED TO / READ BACK: DEVANTE BRICE/YES  DATE / TIME CALLED: 09/27/17 0831  CALLED BY: AMBREEN DAN  AFB^Acid Fast Bacilli (09.21.17 @ 08:37)            RADIOLOGY & ADDITIONAL TESTS:    Imaging Personally Reviewed:    Consultant(s) Notes Reviewed:  pulmonary , Endo    Care Discussed with Consultants/Other Providers: ID

## 2017-09-27 NOTE — PROGRESS NOTE ADULT - SUBJECTIVE AND OBJECTIVE BOX
CHIEF COMPLAINT: SOB    Interval Events: none    REVIEW OF SYSTEMS: Left chest discomfort and burning. Cough improved still SOB and cough.  Still has thick sputum.     [ ] All other systems negative  [ ] Unable to assess ROS because ________    OBJECTIVE:  ICU Vital Signs Last 24 Hrs  T(C): 36.6 (27 Sep 2017 12:32), Max: 36.8 (26 Sep 2017 22:31)  T(F): 97.9 (27 Sep 2017 12:32), Max: 98.2 (26 Sep 2017 22:31)  HR: 84 (27 Sep 2017 12:32) (75 - 84)  BP: 104/67 (27 Sep 2017 12:32) (104/67 - 130/79)  BP(mean): --  ABP: --  ABP(mean): --  RR: 18 (27 Sep 2017 12:32) (18 - 18)  SpO2: 100% (27 Sep 2017 12:32) (97% - 100%)        CAPILLARY BLOOD GLUCOSE  90 (27 Sep 2017 11:56)          PHYSICAL EXAM:  GENERAL: NAD  HEENT:  Atraumatic, moist mucous membranes  RESPIRATORY: Diffuse expiratory wheeze and reduced ronchi.  CARDIOVASCULAR: Regular rate and rhythm; No murmurs; no peripheral edema  GI: Soft, nontender, non distended, normal bowel sounds  SKIN: Dry, intact, No rashes or lesions Signs of excoriation.   NEURO: no deficits.  PSYCH: normal affect, normal mood    HOSPITAL MEDICATIONS:  Standing Meds:  aspirin  chewable 162 milliGRAM(s) Oral daily  heparin  Injectable 5000 Unit(s) SubCutaneous every 12 hours  insulin lispro (HumaLOG) corrective regimen sliding scale   SubCutaneous at bedtime  dextrose 5%. 1000 milliLiter(s) IV Continuous <Continuous>  dextrose 50% Injectable 12.5 Gram(s) IV Push once  dextrose 50% Injectable 25 Gram(s) IV Push once  dextrose 50% Injectable 25 Gram(s) IV Push once  dextrose 5%. 1000 milliLiter(s) IV Continuous <Continuous>  dextrose 50% Injectable 25 Gram(s) IV Push once  buDESOnide 160 MICROgram(s)/formoterol 4.5 MICROgram(s) Inhaler 2 Puff(s) Inhalation two times a day  montelukast 10 milliGRAM(s) Oral at bedtime  insulin lispro (HumaLOG) corrective regimen sliding scale   SubCutaneous three times a day before meals  clotrimazole Lozenge 1 Lozenge Oral five times a day  predniSONE   Tablet 40 milliGRAM(s) Oral daily  levoFLOXacin  Tablet 500 milliGRAM(s) Oral every 24 hours  insulin lispro Injectable (HumaLOG) 19 Unit(s) SubCutaneous three times a day before meals  insulin glargine Injectable (LANTUS) 25 Unit(s) SubCutaneous at bedtime  nystatin Powder 1 Application(s) Topical daily  senna 1 Tablet(s) Oral daily  docusate sodium 100 milliGRAM(s) Oral two times a day      PRN Meds:  dextrose Gel 1 Dose(s) Oral once PRN  glucagon  Injectable 1 milliGRAM(s) IntraMuscular once PRN  dextrose Gel 1 Dose(s) Oral once PRN  glucagon  Injectable 1 milliGRAM(s) IntraMuscular once PRN  melatonin 3 milliGRAM(s) Oral at bedtime PRN  ALBUTerol    90 MICROgram(s) HFA Inhaler 2 Puff(s) Inhalation every 6 hours PRN      LABS:                        11.9   11.12 )-----------( 202      ( 27 Sep 2017 05:55 )             36.4     Hgb Trend: 11.9<--, 11.8<--, 12.7<--, 11.5<--, 11.8<--  09-27    137  |  101  |  22  ----------------------------<  121<H>  3.9   |  25  |  0.58    Ca    9.0      27 Sep 2017 05:55  Phos  3.2     09-27  Mg     2.0     09-27      Creatinine Trend: 0.58<--, 0.65<--, 0.59<--, 0.55<--, 0.58<--, 0.56<--            MICROBIOLOGY: Stentorphomonas and AFB+     RADIOLOGY:  [x] Reviewed and interpreted by me    PULMONARY FUNCTION TESTS:    EKG:

## 2017-09-27 NOTE — PROVIDER CONTACT NOTE (OTHER) - SITUATION
Positive AFB sputum culture
BS 90, holding sliding scale. Pt ordered 19 units premeal, should I still give premeal if blood sugar 90?
Pt received no insulin pre meal, reassessed 1 hour post meal, sugar now 258.

## 2017-09-27 NOTE — PROGRESS NOTE ADULT - ATTENDING COMMENTS
Agree with above. Likely postinfectious bronchiectasis exacerbation caused by RSV with superinfection by stenotrophomonas.  Sputum positive for AFB, DNA probing for BRENTON pending. BRENTON infection is more likely.   Would change abx from levofloxacin to bactrim. Continue current respiratory treatment and current dose of steroids.

## 2017-09-27 NOTE — PROGRESS NOTE ADULT - PROBLEM SELECTOR PLAN 1
target bg 100-180 mg/dl  -c/w lantus 25 u qHS  -change humalog to 17 units sq tid ac  -c/w moderate correctional scales, ac/hs  -monitor steroids for taper, insulin may need to be adjusted if steroids decrease to prevent hypoglycemia     DC plan: basal insulin dose TBD plus metformin 1000 mg BID.  Likely Basaglar.  Please send prescription to pharmacy for insurance verification/coverage of insulin., Dose to be determined at dc.  Based on steroid plan.

## 2017-09-27 NOTE — PROGRESS NOTE ADULT - PROBLEM SELECTOR PLAN 1
-still wheezing, cont prednisone 40 mg qd, c/w duoneb to q6h atc,   -f/u pulm  -bronchiectasis/asthma exacerbation exacerbated by RSV with superinfection from Stenotrophomonas colonization.  -serum immunoglobulin levels normal  -pending aspergillosis lab  -HIV negative  -sputum cx (9/21): stenotrophomonas resistant to ceftazidime but sensitive to levaquin and bactrim; . Pt has Sputum cx x1 + for AFB, Pulmonary recommend to change levaquin to bactrim for R/O TB  -c/w bronchodilator duoneb q6h, singulair hs  -cont chest PT, Aerobika with nebs

## 2017-09-27 NOTE — PROGRESS NOTE ADULT - PROBLEM SELECTOR PLAN 2
-likely chronic lesion from bronchiectasis  -Pt has Sputum cx x1 + for AFB,, will check DNA probe to R/O TB,Pt has hx of BRENTON in the past    Pulmonary recommend to change levaquin to bactrim for possible  TB  -indeterminate quantiferon gold  -f/u aspergillosis lab  ID recommend to continue  airborne isolation until definitely ruled out for TB  -PPD placed yesterday, will f/u

## 2017-09-28 LAB
BASOPHILS # BLD AUTO: 0.06 K/UL — SIGNIFICANT CHANGE UP (ref 0–0.2)
BASOPHILS NFR BLD AUTO: 0.5 % — SIGNIFICANT CHANGE UP (ref 0–2)
BUN SERPL-MCNC: 21 MG/DL — SIGNIFICANT CHANGE UP (ref 7–23)
CALCIUM SERPL-MCNC: 9.1 MG/DL — SIGNIFICANT CHANGE UP (ref 8.4–10.5)
CHLORIDE SERPL-SCNC: 100 MMOL/L — SIGNIFICANT CHANGE UP (ref 98–107)
CO2 SERPL-SCNC: 27 MMOL/L — SIGNIFICANT CHANGE UP (ref 22–31)
CREAT SERPL-MCNC: 0.61 MG/DL — SIGNIFICANT CHANGE UP (ref 0.5–1.3)
EOSINOPHIL # BLD AUTO: 0.18 K/UL — SIGNIFICANT CHANGE UP (ref 0–0.5)
EOSINOPHIL NFR BLD AUTO: 1.6 % — SIGNIFICANT CHANGE UP (ref 0–6)
GLUCOSE SERPL-MCNC: 208 MG/DL — HIGH (ref 70–99)
HCT VFR BLD CALC: 37.5 % — SIGNIFICANT CHANGE UP (ref 34.5–45)
HGB BLD-MCNC: 12 G/DL — SIGNIFICANT CHANGE UP (ref 11.5–15.5)
IMM GRANULOCYTES # BLD AUTO: 0.5 # — SIGNIFICANT CHANGE UP
IMM GRANULOCYTES NFR BLD AUTO: 4.5 % — HIGH (ref 0–1.5)
LYMPHOCYTES # BLD AUTO: 37.5 % — SIGNIFICANT CHANGE UP (ref 13–44)
LYMPHOCYTES # BLD AUTO: 4.14 K/UL — HIGH (ref 1–3.3)
M TB TUBERC IFN-G BLD QL: 0.02 IU/ML — SIGNIFICANT CHANGE UP
M TB TUBERC IFN-G BLD QL: 0.05 IU/ML — SIGNIFICANT CHANGE UP
M TB TUBERC IFN-G BLD QL: NEGATIVE — SIGNIFICANT CHANGE UP
MAGNESIUM SERPL-MCNC: 1.9 MG/DL — SIGNIFICANT CHANGE UP (ref 1.6–2.6)
MCHC RBC-ENTMCNC: 26.8 PG — LOW (ref 27–34)
MCHC RBC-ENTMCNC: 32 % — SIGNIFICANT CHANGE UP (ref 32–36)
MCV RBC AUTO: 83.9 FL — SIGNIFICANT CHANGE UP (ref 80–100)
MITOGEN IGNF BCKGRD COR BLD-ACNC: >10 IU/ML — SIGNIFICANT CHANGE UP
MONOCYTES # BLD AUTO: 0.78 K/UL — SIGNIFICANT CHANGE UP (ref 0–0.9)
MONOCYTES NFR BLD AUTO: 7.1 % — SIGNIFICANT CHANGE UP (ref 2–14)
NEUTROPHILS # BLD AUTO: 5.37 K/UL — SIGNIFICANT CHANGE UP (ref 1.8–7.4)
NEUTROPHILS NFR BLD AUTO: 48.8 % — SIGNIFICANT CHANGE UP (ref 43–77)
NRBC # FLD: 0 — SIGNIFICANT CHANGE UP
PHOSPHATE SERPL-MCNC: 3.7 MG/DL — SIGNIFICANT CHANGE UP (ref 2.5–4.5)
PLATELET # BLD AUTO: 189 K/UL — SIGNIFICANT CHANGE UP (ref 150–400)
PMV BLD: 11.1 FL — SIGNIFICANT CHANGE UP (ref 7–13)
POTASSIUM SERPL-MCNC: 4.3 MMOL/L — SIGNIFICANT CHANGE UP (ref 3.5–5.3)
POTASSIUM SERPL-SCNC: 4.3 MMOL/L — SIGNIFICANT CHANGE UP (ref 3.5–5.3)
RBC # BLD: 4.47 M/UL — SIGNIFICANT CHANGE UP (ref 3.8–5.2)
RBC # FLD: 15.8 % — HIGH (ref 10.3–14.5)
SODIUM SERPL-SCNC: 138 MMOL/L — SIGNIFICANT CHANGE UP (ref 135–145)
WBC # BLD: 11.03 K/UL — HIGH (ref 3.8–10.5)
WBC # FLD AUTO: 11.03 K/UL — HIGH (ref 3.8–10.5)

## 2017-09-28 PROCEDURE — 99233 SBSQ HOSP IP/OBS HIGH 50: CPT

## 2017-09-28 PROCEDURE — 99232 SBSQ HOSP IP/OBS MODERATE 35: CPT

## 2017-09-28 RX ORDER — INSULIN LISPRO 100/ML
16 VIAL (ML) SUBCUTANEOUS
Qty: 0 | Refills: 0 | Status: DISCONTINUED | OUTPATIENT
Start: 2017-09-28 | End: 2017-09-29

## 2017-09-28 RX ORDER — DIPHENHYDRAMINE HCL 50 MG
25 CAPSULE ORAL ONCE
Qty: 0 | Refills: 0 | Status: COMPLETED | OUTPATIENT
Start: 2017-09-28 | End: 2017-09-28

## 2017-09-28 RX ORDER — FAMOTIDINE 10 MG/ML
40 INJECTION INTRAVENOUS ONCE
Qty: 0 | Refills: 0 | Status: COMPLETED | OUTPATIENT
Start: 2017-09-28 | End: 2017-09-28

## 2017-09-28 RX ADMIN — Medication 2: at 08:53

## 2017-09-28 RX ADMIN — BUDESONIDE AND FORMOTEROL FUMARATE DIHYDRATE 2 PUFF(S): 160; 4.5 AEROSOL RESPIRATORY (INHALATION) at 20:29

## 2017-09-28 RX ADMIN — BUDESONIDE AND FORMOTEROL FUMARATE DIHYDRATE 2 PUFF(S): 160; 4.5 AEROSOL RESPIRATORY (INHALATION) at 08:55

## 2017-09-28 RX ADMIN — Medication 1 LOZENGE: at 08:54

## 2017-09-28 RX ADMIN — HEPARIN SODIUM 5000 UNIT(S): 5000 INJECTION INTRAVENOUS; SUBCUTANEOUS at 05:53

## 2017-09-28 RX ADMIN — Medication 25 MILLIGRAM(S): at 22:50

## 2017-09-28 RX ADMIN — NYSTATIN CREAM 1 APPLICATION(S): 100000 CREAM TOPICAL at 12:21

## 2017-09-28 RX ADMIN — Medication 1 LOZENGE: at 23:30

## 2017-09-28 RX ADMIN — INSULIN GLARGINE 25 UNIT(S): 100 INJECTION, SOLUTION SUBCUTANEOUS at 22:50

## 2017-09-28 RX ADMIN — Medication 1 LOZENGE: at 12:16

## 2017-09-28 RX ADMIN — Medication 1 TABLET(S): at 05:53

## 2017-09-28 RX ADMIN — Medication 1 TABLET(S): at 17:21

## 2017-09-28 RX ADMIN — SENNA PLUS 1 TABLET(S): 8.6 TABLET ORAL at 12:21

## 2017-09-28 RX ADMIN — Medication 162 MILLIGRAM(S): at 12:16

## 2017-09-28 RX ADMIN — Medication 1 LOZENGE: at 17:21

## 2017-09-28 RX ADMIN — Medication 16 UNIT(S): at 12:17

## 2017-09-28 RX ADMIN — Medication 16 UNIT(S): at 17:21

## 2017-09-28 RX ADMIN — Medication 25 MILLIGRAM(S): at 00:26

## 2017-09-28 RX ADMIN — Medication 40 MILLIGRAM(S): at 05:54

## 2017-09-28 RX ADMIN — Medication 3 MILLIGRAM(S): at 22:50

## 2017-09-28 RX ADMIN — Medication 100 MILLIGRAM(S): at 17:21

## 2017-09-28 RX ADMIN — Medication 1 LOZENGE: at 00:27

## 2017-09-28 RX ADMIN — MONTELUKAST 10 MILLIGRAM(S): 4 TABLET, CHEWABLE ORAL at 22:50

## 2017-09-28 RX ADMIN — Medication 17 UNIT(S): at 08:53

## 2017-09-28 RX ADMIN — FAMOTIDINE 40 MILLIGRAM(S): 10 INJECTION INTRAVENOUS at 14:19

## 2017-09-28 RX ADMIN — INSULIN GLARGINE 25 UNIT(S): 100 INJECTION, SOLUTION SUBCUTANEOUS at 00:26

## 2017-09-28 RX ADMIN — HEPARIN SODIUM 5000 UNIT(S): 5000 INJECTION INTRAVENOUS; SUBCUTANEOUS at 17:20

## 2017-09-28 RX ADMIN — POLYETHYLENE GLYCOL 3350 17 GRAM(S): 17 POWDER, FOR SOLUTION ORAL at 12:16

## 2017-09-28 RX ADMIN — Medication 100 MILLIGRAM(S): at 05:54

## 2017-09-28 RX ADMIN — Medication 1 LOZENGE: at 20:29

## 2017-09-28 NOTE — PROGRESS NOTE ADULT - PROBLEM SELECTOR PLAN 2
-likely chronic lesion from bronchiectasis  -Pt has Sputum cx x1 + for AFB,, will check DNA probe to R/O TB,Pt has hx of BRENTON in the past    Pulmonary recommend to change levaquin to bactrim for possible  TB  -indeterminate quantiferon gold  -f/u aspergillosis lab  ID recommend to continue  airborne isolation until definitely ruled out for TB  -PPD placed yesterday, will f/u -likely chronic lesion from bronchiectasis  -Pt has Sputum cx x1 + for AFB, PCR checking in progress to  R/O TB, Pt has hx of BRENTON in the past    Pulmonary recommend to change levaquin to bactrim for possible  TB  -indeterminate quantiferon gold, PPD negative   -f/u aspergillosis lab  ID recommend to continue  airborne isolation until definitely ruled out for TB -likely chronic lesion from bronchiectasis  -Pt has Sputum cx x1 + for AFB, PCR checking in progress to  R/O TB, Pt has hx of BRENTON in the past    Pulmonary recommend to change levaquin to bactrim for possible  TB  -indeterminate quantiferon gold initially, repeat quant gold negative  PPD negative   -aspergillosis lab negative   ID recommend to continue  airborne isolation until definitely ruled out for TB

## 2017-09-28 NOTE — PROGRESS NOTE ADULT - SUBJECTIVE AND OBJECTIVE BOX
Follow Up:      Inverval History/ROS:Patient is a 62y old  Female who presents with a chief complaint of fluctuating sugars (20 Sep 2017 12:22)    Notes cough, sob.      No fever      Allergies    No Known Allergies    Intolerances        ANTIMICROBIALS:  clotrimazole Lozenge 1 five times a day  trimethoprim  160 mG/sulfamethoxazole 800 mG 1 two times a day      OTHER MEDS:  aspirin  chewable 162 milliGRAM(s) Oral daily  heparin  Injectable 5000 Unit(s) SubCutaneous every 12 hours  insulin lispro (HumaLOG) corrective regimen sliding scale   SubCutaneous at bedtime  dextrose 5%. 1000 milliLiter(s) IV Continuous <Continuous>  dextrose Gel 1 Dose(s) Oral once PRN  dextrose 50% Injectable 12.5 Gram(s) IV Push once  dextrose 50% Injectable 25 Gram(s) IV Push once  dextrose 50% Injectable 25 Gram(s) IV Push once  glucagon  Injectable 1 milliGRAM(s) IntraMuscular once PRN  dextrose 5%. 1000 milliLiter(s) IV Continuous <Continuous>  dextrose Gel 1 Dose(s) Oral once PRN  dextrose 50% Injectable 25 Gram(s) IV Push once  glucagon  Injectable 1 milliGRAM(s) IntraMuscular once PRN  buDESOnide 160 MICROgram(s)/formoterol 4.5 MICROgram(s) Inhaler 2 Puff(s) Inhalation two times a day  montelukast 10 milliGRAM(s) Oral at bedtime  insulin lispro (HumaLOG) corrective regimen sliding scale   SubCutaneous three times a day before meals  predniSONE   Tablet 40 milliGRAM(s) Oral daily  melatonin 3 milliGRAM(s) Oral at bedtime PRN  ALBUTerol    90 MICROgram(s) HFA Inhaler 2 Puff(s) Inhalation every 6 hours PRN  insulin glargine Injectable (LANTUS) 25 Unit(s) SubCutaneous at bedtime  nystatin Powder 1 Application(s) Topical daily  senna 1 Tablet(s) Oral daily  docusate sodium 100 milliGRAM(s) Oral two times a day  polyethylene glycol 3350 17 Gram(s) Oral daily  insulin lispro Injectable (HumaLOG) 16 Unit(s) SubCutaneous three times a day before meals      Vital Signs Last 24 Hrs  T(C): 36.8 (28 Sep 2017 12:43), Max: 36.8 (27 Sep 2017 21:58)  T(F): 98.2 (28 Sep 2017 12:43), Max: 98.2 (27 Sep 2017 21:58)  HR: 69 (28 Sep 2017 12:43) (69 - 89)  BP: 112/69 (28 Sep 2017 12:43) (112/69 - 129/71)  BP(mean): --  RR: 17 (28 Sep 2017 12:43) (17 - 18)  SpO2: 95% (28 Sep 2017 12:43) (95% - 97%)    PHYSICAL EXAM:  General: [x ] non-toxic  HEAD/EYES: [ ] PERRL [x ] white sclera [ ] icterus  ENT:  [ ] normal [ x] supple [ ] thrush [ ] pharyngeal exudate  Cardiovascular:   [ ] murmur [ ] normal [ ] PPM/AICD  Respiratory:  [x ]course BS  GI:  [ x] soft, non-tender, normal bowel sounds  :  [ ] francisco [x ] no CVA tenderness   Musculoskeletal:  [ x] no synovitis  Neurologic:  [x ] non-focal exam   Skin:  x[ ] no rash  Lymph: [ x] no lymphadenopathy  Psychiatric:  [ ] appropriate affect [ ] alert & oriented  Lines:  [ ] no phlebitis [ ] central line                                12.0   11.03 )-----------( 189      ( 28 Sep 2017 05:00 )             37.5       09-28    138  |  100  |  21  ----------------------------<  208<H>  4.3   |  27  |  0.61    Ca    9.1      28 Sep 2017 05:00  Phos  3.7     09-28  Mg     1.9     09-28            MICROBIOLOGY:    RADIOLOGY:

## 2017-09-28 NOTE — PROGRESS NOTE ADULT - ATTENDING COMMENTS
plan of care was discussed with daughter and spouse  at bedside plan of care was discussed with daughter  at bedside

## 2017-09-28 NOTE — PROGRESS NOTE ADULT - PROBLEM SELECTOR PLAN 3
-endo following   -uncontrolled DM-2 with A1c 10.8%  -insulin regimen adjusted by  endo,   -monitor FSBS   - pt on prednisone ,contributing to the hyperglycemia -endo following   -uncontrolled DM-2 with A1c 10.8%  -insulin regimen adjusted by endo, FSBS controlled   -monitor FSBS   - pt on prednisone ,contributing to the hyperglycemia

## 2017-09-28 NOTE — PROGRESS NOTE ADULT - SUBJECTIVE AND OBJECTIVE BOX
Patient is a 62y old  Female who presents with a chief complaint of fluctuating sugars (20 Sep 2017 12:22)      SUBJECTIVE / OVERNIGHT EVENTS:    MEDICATIONS  (STANDING):  aspirin  chewable 162 milliGRAM(s) Oral daily  heparin  Injectable 5000 Unit(s) SubCutaneous every 12 hours  insulin lispro (HumaLOG) corrective regimen sliding scale   SubCutaneous at bedtime  dextrose 5%. 1000 milliLiter(s) (50 mL/Hr) IV Continuous <Continuous>  dextrose 50% Injectable 12.5 Gram(s) IV Push once  dextrose 50% Injectable 25 Gram(s) IV Push once  dextrose 50% Injectable 25 Gram(s) IV Push once  dextrose 5%. 1000 milliLiter(s) (50 mL/Hr) IV Continuous <Continuous>  dextrose 50% Injectable 25 Gram(s) IV Push once  buDESOnide 160 MICROgram(s)/formoterol 4.5 MICROgram(s) Inhaler 2 Puff(s) Inhalation two times a day  montelukast 10 milliGRAM(s) Oral at bedtime  insulin lispro (HumaLOG) corrective regimen sliding scale   SubCutaneous three times a day before meals  clotrimazole Lozenge 1 Lozenge Oral five times a day  predniSONE   Tablet 40 milliGRAM(s) Oral daily  insulin glargine Injectable (LANTUS) 25 Unit(s) SubCutaneous at bedtime  nystatin Powder 1 Application(s) Topical daily  senna 1 Tablet(s) Oral daily  docusate sodium 100 milliGRAM(s) Oral two times a day  insulin lispro Injectable (HumaLOG) 17 Unit(s) SubCutaneous three times a day before meals  trimethoprim  160 mG/sulfamethoxazole 800 mG 1 Tablet(s) Oral two times a day  polyethylene glycol 3350 17 Gram(s) Oral daily    MEDICATIONS  (PRN):  dextrose Gel 1 Dose(s) Oral once PRN Blood Glucose LESS THAN 70 milliGRAM(s)/deciliter  glucagon  Injectable 1 milliGRAM(s) IntraMuscular once PRN Glucose LESS THAN 70 milligrams/deciliter  dextrose Gel 1 Dose(s) Oral once PRN Blood Glucose LESS THAN 70 milliGRAM(s)/deciliter  glucagon  Injectable 1 milliGRAM(s) IntraMuscular once PRN Glucose LESS THAN 70 milligrams/deciliter  melatonin 3 milliGRAM(s) Oral at bedtime PRN Insomnia  ALBUTerol    90 MICROgram(s) HFA Inhaler 2 Puff(s) Inhalation every 6 hours PRN Shortness of Breath and/or Wheezing      Vital Signs Last 24 Hrs  T(C): 36.6 (28 Sep 2017 05:52), Max: 36.8 (27 Sep 2017 21:58)  T(F): 97.9 (28 Sep 2017 05:52), Max: 98.2 (27 Sep 2017 21:58)  HR: 79 (28 Sep 2017 05:52) (79 - 89)  BP: 113/72 (28 Sep 2017 05:52) (104/67 - 129/71)  BP(mean): --  RR: 18 (28 Sep 2017 05:52) (18 - 18)  SpO2: 97% (28 Sep 2017 05:52) (97% - 100%)  CAPILLARY BLOOD GLUCOSE  181 (28 Sep 2017 08:22)  197 (28 Sep 2017 00:00)  90 (27 Sep 2017 22:53)  194 (27 Sep 2017 16:32)  258 (27 Sep 2017 14:19)  90 (27 Sep 2017 11:56)        I&O's Summary      PHYSICAL EXAM:  GENERAL: NAD, well-developed  HEAD:  Atraumatic, Normocephalic  EYES: EOMI, PERRLA, conjunctiva and sclera clear  NECK: Supple, No JVD  CHEST/LUNG: Clear to auscultation bilaterally; No wheeze  HEART: Regular rate and rhythm; No murmurs, rubs, or gallops  ABDOMEN: Soft, Nontender, Nondistended; Bowel sounds present  EXTREMITIES:  2+ Peripheral Pulses, No clubbing, cyanosis, or edema  PSYCH: AAOx3  NEUROLOGY: non-focal  SKIN: No rashes or lesions    LABS:                        12.0   11.03 )-----------( 189      ( 28 Sep 2017 05:00 )             37.5     09-28    138  |  100  |  21  ----------------------------<  208<H>  4.3   |  27  |  0.61    Ca    9.1      28 Sep 2017 05:00  Phos  3.7     09-28  Mg     1.9     09-28                RADIOLOGY & ADDITIONAL TESTS:    Imaging Personally Reviewed:    Consultant(s) Notes Reviewed:      Care Discussed with Consultants/Other Providers: Patient is a 62y old  Female who presents with a chief complaint of fluctuating sugars (20 Sep 2017 12:22)      SUBJECTIVE / OVERNIGHT EVENTS: patient seen and examined by bedside, feeling better, cough improving, denies fever, chills      MEDICATIONS  (STANDING):  aspirin  chewable 162 milliGRAM(s) Oral daily  heparin  Injectable 5000 Unit(s) SubCutaneous every 12 hours  insulin lispro (HumaLOG) corrective regimen sliding scale   SubCutaneous at bedtime  dextrose 5%. 1000 milliLiter(s) (50 mL/Hr) IV Continuous <Continuous>  dextrose 50% Injectable 12.5 Gram(s) IV Push once  dextrose 50% Injectable 25 Gram(s) IV Push once  dextrose 50% Injectable 25 Gram(s) IV Push once  dextrose 5%. 1000 milliLiter(s) (50 mL/Hr) IV Continuous <Continuous>  dextrose 50% Injectable 25 Gram(s) IV Push once  buDESOnide 160 MICROgram(s)/formoterol 4.5 MICROgram(s) Inhaler 2 Puff(s) Inhalation two times a day  montelukast 10 milliGRAM(s) Oral at bedtime  insulin lispro (HumaLOG) corrective regimen sliding scale   SubCutaneous three times a day before meals  clotrimazole Lozenge 1 Lozenge Oral five times a day  predniSONE   Tablet 40 milliGRAM(s) Oral daily  insulin glargine Injectable (LANTUS) 25 Unit(s) SubCutaneous at bedtime  nystatin Powder 1 Application(s) Topical daily  senna 1 Tablet(s) Oral daily  docusate sodium 100 milliGRAM(s) Oral two times a day  insulin lispro Injectable (HumaLOG) 17 Unit(s) SubCutaneous three times a day before meals  trimethoprim  160 mG/sulfamethoxazole 800 mG 1 Tablet(s) Oral two times a day  polyethylene glycol 3350 17 Gram(s) Oral daily    MEDICATIONS  (PRN):  dextrose Gel 1 Dose(s) Oral once PRN Blood Glucose LESS THAN 70 milliGRAM(s)/deciliter  glucagon  Injectable 1 milliGRAM(s) IntraMuscular once PRN Glucose LESS THAN 70 milligrams/deciliter  dextrose Gel 1 Dose(s) Oral once PRN Blood Glucose LESS THAN 70 milliGRAM(s)/deciliter  glucagon  Injectable 1 milliGRAM(s) IntraMuscular once PRN Glucose LESS THAN 70 milligrams/deciliter  melatonin 3 milliGRAM(s) Oral at bedtime PRN Insomnia  ALBUTerol    90 MICROgram(s) HFA Inhaler 2 Puff(s) Inhalation every 6 hours PRN Shortness of Breath and/or Wheezing      Vital Signs Last 24 Hrs  T(C): 36.6 (28 Sep 2017 05:52), Max: 36.8 (27 Sep 2017 21:58)  T(F): 97.9 (28 Sep 2017 05:52), Max: 98.2 (27 Sep 2017 21:58)  HR: 79 (28 Sep 2017 05:52) (79 - 89)  BP: 113/72 (28 Sep 2017 05:52) (104/67 - 129/71)  BP(mean): --  RR: 18 (28 Sep 2017 05:52) (18 - 18)  SpO2: 97% (28 Sep 2017 05:52) (97% - 100%)  CAPILLARY BLOOD GLUCOSE  181 (28 Sep 2017 08:22)  197 (28 Sep 2017 00:00)  90 (27 Sep 2017 22:53)  194 (27 Sep 2017 16:32)  258 (27 Sep 2017 14:19)  90 (27 Sep 2017 11:56)        I&O's Summary    PHYSICAL EXAM:  GENERAL: NAD, well-developed  HEAD:  Atraumatic, Normocephalic  EYES: EOMI, PERRLA, conjunctiva and sclera clear  NECK: Supple, No JVD  CHEST/LUNG: diffuse expiratory wheezing. breathing non labored   HEART: Regular rate and rhythm; No murmurs, rubs, or gallops  ABDOMEN: Soft, Nontender, Nondistended; Bowel sounds present  : no evidence of blistering lesion seen in vagina   EXTREMITIES:  No clubbing, cyanosis, or edema  PSYCH: AAOx3  NEUROLOGY: non-focal  SKIN: No rashes or lesions    LABS:                        12.0   11.03 )-----------( 189      ( 28 Sep 2017 05:00 )             37.5     09-28    138  |  100  |  21  ----------------------------<  208<H>  4.3   |  27  |  0.61    Ca    9.1      28 Sep 2017 05:00  Phos  3.7     09-28  Mg     1.9     09-28                RADIOLOGY & ADDITIONAL TESTS:    Imaging Personally Reviewed:    Consultant(s) Notes Reviewed:      Care Discussed with Consultants/Other Providers: Patient is a 62y old  Female who presents with a chief complaint of fluctuating sugars (20 Sep 2017 12:22)      SUBJECTIVE / OVERNIGHT EVENTS: patient seen and examined by bedside, feeling better, cough improving, denies fever, chills      MEDICATIONS  (STANDING):  aspirin  chewable 162 milliGRAM(s) Oral daily  heparin  Injectable 5000 Unit(s) SubCutaneous every 12 hours  insulin lispro (HumaLOG) corrective regimen sliding scale   SubCutaneous at bedtime  dextrose 5%. 1000 milliLiter(s) (50 mL/Hr) IV Continuous <Continuous>  dextrose 50% Injectable 12.5 Gram(s) IV Push once  dextrose 50% Injectable 25 Gram(s) IV Push once  dextrose 50% Injectable 25 Gram(s) IV Push once  dextrose 5%. 1000 milliLiter(s) (50 mL/Hr) IV Continuous <Continuous>  dextrose 50% Injectable 25 Gram(s) IV Push once  buDESOnide 160 MICROgram(s)/formoterol 4.5 MICROgram(s) Inhaler 2 Puff(s) Inhalation two times a day  montelukast 10 milliGRAM(s) Oral at bedtime  insulin lispro (HumaLOG) corrective regimen sliding scale   SubCutaneous three times a day before meals  clotrimazole Lozenge 1 Lozenge Oral five times a day  predniSONE   Tablet 40 milliGRAM(s) Oral daily  insulin glargine Injectable (LANTUS) 25 Unit(s) SubCutaneous at bedtime  nystatin Powder 1 Application(s) Topical daily  senna 1 Tablet(s) Oral daily  docusate sodium 100 milliGRAM(s) Oral two times a day  insulin lispro Injectable (HumaLOG) 17 Unit(s) SubCutaneous three times a day before meals  trimethoprim  160 mG/sulfamethoxazole 800 mG 1 Tablet(s) Oral two times a day  polyethylene glycol 3350 17 Gram(s) Oral daily    MEDICATIONS  (PRN):  dextrose Gel 1 Dose(s) Oral once PRN Blood Glucose LESS THAN 70 milliGRAM(s)/deciliter  glucagon  Injectable 1 milliGRAM(s) IntraMuscular once PRN Glucose LESS THAN 70 milligrams/deciliter  dextrose Gel 1 Dose(s) Oral once PRN Blood Glucose LESS THAN 70 milliGRAM(s)/deciliter  glucagon  Injectable 1 milliGRAM(s) IntraMuscular once PRN Glucose LESS THAN 70 milligrams/deciliter  melatonin 3 milliGRAM(s) Oral at bedtime PRN Insomnia  ALBUTerol    90 MICROgram(s) HFA Inhaler 2 Puff(s) Inhalation every 6 hours PRN Shortness of Breath and/or Wheezing      Vital Signs Last 24 Hrs  T(C): 36.6 (28 Sep 2017 05:52), Max: 36.8 (27 Sep 2017 21:58)  T(F): 97.9 (28 Sep 2017 05:52), Max: 98.2 (27 Sep 2017 21:58)  HR: 79 (28 Sep 2017 05:52) (79 - 89)  BP: 113/72 (28 Sep 2017 05:52) (104/67 - 129/71)  BP(mean): --  RR: 18 (28 Sep 2017 05:52) (18 - 18)  SpO2: 97% (28 Sep 2017 05:52) (97% - 100%)  CAPILLARY BLOOD GLUCOSE  181 (28 Sep 2017 08:22)  197 (28 Sep 2017 00:00)  90 (27 Sep 2017 22:53)  194 (27 Sep 2017 16:32)  258 (27 Sep 2017 14:19)  90 (27 Sep 2017 11:56)        I&O's Summary    PHYSICAL EXAM:  GENERAL: NAD, well-developed  HEAD:  Atraumatic, Normocephalic  EYES: EOMI, PERRLA, conjunctiva and sclera clear  NECK: Supple, No JVD  CHEST/LUNG: diffuse expiratory wheezing. breathing non labored   HEART: Regular rate and rhythm; No murmurs, rubs, or gallops  ABDOMEN: Soft, Nontender, Nondistended; Bowel sounds present  : no evidence of blistering lesion seen in vagina   EXTREMITIES:  No clubbing, cyanosis, or edema  PSYCH: AAOx3  NEUROLOGY: non-focal  SKIN: No rashes or lesions    LABS:                        12.0   11.03 )-----------( 189      ( 28 Sep 2017 05:00 )             37.5     09-28    138  |  100  |  21  ----------------------------<  208<H>  4.3   |  27  |  0.61    Ca    9.1      28 Sep 2017 05:00  Phos  3.7     09-28  Mg     1.9     09-28                RADIOLOGY & ADDITIONAL TESTS:    Imaging Personally Reviewed:    Consultant(s) Notes Reviewed:  ID     Care Discussed with Consultants/Other Providers:

## 2017-09-28 NOTE — PROGRESS NOTE ADULT - PROBLEM SELECTOR PLAN 1
-still wheezing, cont prednisone 40 mg qd, c/w duoneb to q6h atc,   -f/u pulm  -bronchiectasis/asthma exacerbation exacerbated by RSV with superinfection from Stenotrophomonas colonization.  -serum immunoglobulin levels normal  -pending aspergillosis lab  -HIV negative  -sputum cx (9/21): stenotrophomonas resistant to ceftazidime but sensitive to levaquin and bactrim; . Pt has Sputum cx x1 + for AFB, Pulmonary recommend to change levaquin to bactrim for R/O TB  -c/w bronchodilator duoneb q6h, singulair hs  -cont chest PT, Aerobika with nebs -improving clinically, cont prednisone 40 mg qd, c/w duoneb to q6h atc,   -f/u pulm  -bronchiectasis/asthma exacerbation exacerbated by RSV with superinfection from Stenotrophomonas colonization.  -serum immunoglobulin levels normal  -pending aspergillosis lab  -HIV negative  -sputum cx (9/21): stenotrophomonas resistant to ceftazidime but sensitive to levaquin and bactrim; . Pt has Sputum cx x1 + for AFB, Pulmonary recommend to change levaquin to bactrim for R/O TB  -c/w bronchodilator duoneb q6h, singulair hs  -cont chest PT, Aerobika with nebs

## 2017-09-29 VITALS
DIASTOLIC BLOOD PRESSURE: 65 MMHG | HEART RATE: 81 BPM | SYSTOLIC BLOOD PRESSURE: 108 MMHG | RESPIRATION RATE: 18 BRPM | TEMPERATURE: 98 F | OXYGEN SATURATION: 95 %

## 2017-09-29 LAB
BASOPHILS # BLD AUTO: 0.09 K/UL — SIGNIFICANT CHANGE UP (ref 0–0.2)
BASOPHILS NFR BLD AUTO: 0.8 % — SIGNIFICANT CHANGE UP (ref 0–2)
BUN SERPL-MCNC: 20 MG/DL — SIGNIFICANT CHANGE UP (ref 7–23)
CALCIUM SERPL-MCNC: 9.3 MG/DL — SIGNIFICANT CHANGE UP (ref 8.4–10.5)
CHLORIDE SERPL-SCNC: 101 MMOL/L — SIGNIFICANT CHANGE UP (ref 98–107)
CO2 SERPL-SCNC: 25 MMOL/L — SIGNIFICANT CHANGE UP (ref 22–31)
CREAT SERPL-MCNC: 0.75 MG/DL — SIGNIFICANT CHANGE UP (ref 0.5–1.3)
DEPRECATED M TB RPOB XXX QL NAA+PROBE: SIGNIFICANT CHANGE UP
EOSINOPHIL # BLD AUTO: 0.14 K/UL — SIGNIFICANT CHANGE UP (ref 0–0.5)
EOSINOPHIL NFR BLD AUTO: 1.3 % — SIGNIFICANT CHANGE UP (ref 0–6)
GLUCOSE SERPL-MCNC: 131 MG/DL — HIGH (ref 70–99)
HCT VFR BLD CALC: 38.4 % — SIGNIFICANT CHANGE UP (ref 34.5–45)
HGB BLD-MCNC: 12.5 G/DL — SIGNIFICANT CHANGE UP (ref 11.5–15.5)
IMM GRANULOCYTES # BLD AUTO: 0.5 # — SIGNIFICANT CHANGE UP
IMM GRANULOCYTES NFR BLD AUTO: 4.5 % — HIGH (ref 0–1.5)
LYMPHOCYTES # BLD AUTO: 4.73 K/UL — HIGH (ref 1–3.3)
LYMPHOCYTES # BLD AUTO: 42.7 % — SIGNIFICANT CHANGE UP (ref 13–44)
M TB CMPLX DNA SPT/BRO QL NAA+PROBE: SIGNIFICANT CHANGE UP
MAGNESIUM SERPL-MCNC: 1.9 MG/DL — SIGNIFICANT CHANGE UP (ref 1.6–2.6)
MCHC RBC-ENTMCNC: 26.9 PG — LOW (ref 27–34)
MCHC RBC-ENTMCNC: 32.6 % — SIGNIFICANT CHANGE UP (ref 32–36)
MCV RBC AUTO: 82.8 FL — SIGNIFICANT CHANGE UP (ref 80–100)
MONOCYTES # BLD AUTO: 0.79 K/UL — SIGNIFICANT CHANGE UP (ref 0–0.9)
MONOCYTES NFR BLD AUTO: 7.1 % — SIGNIFICANT CHANGE UP (ref 2–14)
MTB RIF RES GENE SPT NAA+PROBE: SIGNIFICANT CHANGE UP
NEUTROPHILS # BLD AUTO: 4.82 K/UL — SIGNIFICANT CHANGE UP (ref 1.8–7.4)
NEUTROPHILS NFR BLD AUTO: 43.6 % — SIGNIFICANT CHANGE UP (ref 43–77)
NRBC # FLD: 0 — SIGNIFICANT CHANGE UP
PHOSPHATE SERPL-MCNC: 4 MG/DL — SIGNIFICANT CHANGE UP (ref 2.5–4.5)
PLATELET # BLD AUTO: 191 K/UL — SIGNIFICANT CHANGE UP (ref 150–400)
PMV BLD: 11.7 FL — SIGNIFICANT CHANGE UP (ref 7–13)
POTASSIUM SERPL-MCNC: 4.7 MMOL/L — SIGNIFICANT CHANGE UP (ref 3.5–5.3)
POTASSIUM SERPL-SCNC: 4.7 MMOL/L — SIGNIFICANT CHANGE UP (ref 3.5–5.3)
RBC # BLD: 4.64 M/UL — SIGNIFICANT CHANGE UP (ref 3.8–5.2)
RBC # FLD: 16.3 % — HIGH (ref 10.3–14.5)
SODIUM SERPL-SCNC: 137 MMOL/L — SIGNIFICANT CHANGE UP (ref 135–145)
WBC # BLD: 11.07 K/UL — HIGH (ref 3.8–10.5)
WBC # FLD AUTO: 11.07 K/UL — HIGH (ref 3.8–10.5)

## 2017-09-29 PROCEDURE — 99232 SBSQ HOSP IP/OBS MODERATE 35: CPT

## 2017-09-29 PROCEDURE — 99239 HOSP IP/OBS DSCHRG MGMT >30: CPT

## 2017-09-29 RX ORDER — METFORMIN HYDROCHLORIDE 850 MG/1
1 TABLET ORAL
Qty: 0 | Refills: 0 | COMMUNITY

## 2017-09-29 RX ORDER — PIOGLITAZONE HYDROCHLORIDE 15 MG/1
1 TABLET ORAL
Qty: 0 | Refills: 0 | COMMUNITY

## 2017-09-29 RX ORDER — INSULIN GLARGINE 100 [IU]/ML
10 INJECTION, SOLUTION SUBCUTANEOUS
Qty: 1 | Refills: 0
Start: 2017-09-29 | End: 2017-10-29

## 2017-09-29 RX ORDER — ALBUTEROL 90 UG/1
2 AEROSOL, METERED ORAL
Qty: 8 | Refills: 0
Start: 2017-09-29

## 2017-09-29 RX ORDER — MONTELUKAST 4 MG/1
1 TABLET, CHEWABLE ORAL
Qty: 30 | Refills: 0 | OUTPATIENT
Start: 2017-09-29 | End: 2017-10-29

## 2017-09-29 RX ORDER — INSULIN GLARGINE 100 [IU]/ML
25 INJECTION, SOLUTION SUBCUTANEOUS
Qty: 1 | Refills: 0
Start: 2017-09-29 | End: 2017-10-29

## 2017-09-29 RX ORDER — NYSTATIN CREAM 100000 [USP'U]/G
1 CREAM TOPICAL
Qty: 1 | Refills: 0 | OUTPATIENT
Start: 2017-09-29 | End: 2017-10-29

## 2017-09-29 RX ORDER — GLIMEPIRIDE 1 MG
1 TABLET ORAL
Qty: 0 | Refills: 0 | COMMUNITY

## 2017-09-29 RX ORDER — ASPIRIN/CALCIUM CARB/MAGNESIUM 324 MG
2 TABLET ORAL
Qty: 0 | Refills: 0 | DISCHARGE
Start: 2017-09-29

## 2017-09-29 RX ORDER — SENNA PLUS 8.6 MG/1
1 TABLET ORAL
Qty: 0 | Refills: 0 | COMMUNITY
Start: 2017-09-29

## 2017-09-29 RX ORDER — ASPIRIN/CALCIUM CARB/MAGNESIUM 324 MG
1 TABLET ORAL
Qty: 0 | Refills: 0 | DISCHARGE
Start: 2017-09-29

## 2017-09-29 RX ORDER — CLOTRIMAZOLE 10 MG
1 TROCHE MUCOUS MEMBRANE
Qty: 25 | Refills: 0 | OUTPATIENT
Start: 2017-09-29 | End: 2017-10-04

## 2017-09-29 RX ORDER — LANOLIN ALCOHOL/MO/W.PET/CERES
1 CREAM (GRAM) TOPICAL
Qty: 0 | Refills: 0 | COMMUNITY
Start: 2017-09-29

## 2017-09-29 RX ORDER — METFORMIN HYDROCHLORIDE 850 MG/1
1 TABLET ORAL
Qty: 60 | Refills: 0 | OUTPATIENT
Start: 2017-09-29 | End: 2017-10-29

## 2017-09-29 RX ORDER — INSULIN LISPRO 100/ML
0 VIAL (ML) SUBCUTANEOUS
Qty: 0 | Refills: 0 | COMMUNITY
Start: 2017-09-29

## 2017-09-29 RX ORDER — DOCUSATE SODIUM 100 MG
1 CAPSULE ORAL
Qty: 0 | Refills: 0 | COMMUNITY
Start: 2017-09-29

## 2017-09-29 RX ADMIN — Medication 1 LOZENGE: at 08:49

## 2017-09-29 RX ADMIN — HEPARIN SODIUM 5000 UNIT(S): 5000 INJECTION INTRAVENOUS; SUBCUTANEOUS at 06:03

## 2017-09-29 RX ADMIN — Medication 16 UNIT(S): at 17:57

## 2017-09-29 RX ADMIN — Medication 1 TABLET(S): at 17:57

## 2017-09-29 RX ADMIN — Medication 40 MILLIGRAM(S): at 06:02

## 2017-09-29 RX ADMIN — Medication 1 LOZENGE: at 17:57

## 2017-09-29 RX ADMIN — BUDESONIDE AND FORMOTEROL FUMARATE DIHYDRATE 2 PUFF(S): 160; 4.5 AEROSOL RESPIRATORY (INHALATION) at 08:49

## 2017-09-29 RX ADMIN — POLYETHYLENE GLYCOL 3350 17 GRAM(S): 17 POWDER, FOR SOLUTION ORAL at 12:31

## 2017-09-29 RX ADMIN — SENNA PLUS 1 TABLET(S): 8.6 TABLET ORAL at 12:30

## 2017-09-29 RX ADMIN — Medication 2: at 08:49

## 2017-09-29 RX ADMIN — Medication 16 UNIT(S): at 12:28

## 2017-09-29 RX ADMIN — NYSTATIN CREAM 1 APPLICATION(S): 100000 CREAM TOPICAL at 12:30

## 2017-09-29 RX ADMIN — Medication 1 LOZENGE: at 12:30

## 2017-09-29 RX ADMIN — Medication 1 TABLET(S): at 06:02

## 2017-09-29 RX ADMIN — Medication 100 MILLIGRAM(S): at 17:57

## 2017-09-29 RX ADMIN — Medication 100 MILLIGRAM(S): at 06:02

## 2017-09-29 RX ADMIN — Medication 162 MILLIGRAM(S): at 12:30

## 2017-09-29 RX ADMIN — Medication 6: at 12:28

## 2017-09-29 RX ADMIN — Medication 16 UNIT(S): at 08:49

## 2017-09-29 NOTE — PROGRESS NOTE ADULT - SUBJECTIVE AND OBJECTIVE BOX
Follow Up:      Inverval History/ROS:Patient is a 62y old  Female who presents with a chief complaint of fluctuating sugars (20 Sep 2017 12:22)      Still has cough.  No fever.         Allergies    No Known Allergies    Intolerances        ANTIMICROBIALS:  clotrimazole Lozenge 1 five times a day  trimethoprim  160 mG/sulfamethoxazole 800 mG 1 two times a day      OTHER MEDS:  aspirin  chewable 162 milliGRAM(s) Oral daily  heparin  Injectable 5000 Unit(s) SubCutaneous every 12 hours  insulin lispro (HumaLOG) corrective regimen sliding scale   SubCutaneous at bedtime  dextrose 5%. 1000 milliLiter(s) IV Continuous <Continuous>  dextrose Gel 1 Dose(s) Oral once PRN  dextrose 50% Injectable 12.5 Gram(s) IV Push once  dextrose 50% Injectable 25 Gram(s) IV Push once  dextrose 50% Injectable 25 Gram(s) IV Push once  glucagon  Injectable 1 milliGRAM(s) IntraMuscular once PRN  dextrose 5%. 1000 milliLiter(s) IV Continuous <Continuous>  dextrose Gel 1 Dose(s) Oral once PRN  dextrose 50% Injectable 25 Gram(s) IV Push once  glucagon  Injectable 1 milliGRAM(s) IntraMuscular once PRN  buDESOnide 160 MICROgram(s)/formoterol 4.5 MICROgram(s) Inhaler 2 Puff(s) Inhalation two times a day  montelukast 10 milliGRAM(s) Oral at bedtime  insulin lispro (HumaLOG) corrective regimen sliding scale   SubCutaneous three times a day before meals  predniSONE   Tablet 40 milliGRAM(s) Oral daily  melatonin 3 milliGRAM(s) Oral at bedtime PRN  ALBUTerol    90 MICROgram(s) HFA Inhaler 2 Puff(s) Inhalation every 6 hours PRN  insulin glargine Injectable (LANTUS) 25 Unit(s) SubCutaneous at bedtime  nystatin Powder 1 Application(s) Topical daily  senna 1 Tablet(s) Oral daily  docusate sodium 100 milliGRAM(s) Oral two times a day  polyethylene glycol 3350 17 Gram(s) Oral daily  insulin lispro Injectable (HumaLOG) 16 Unit(s) SubCutaneous three times a day before meals      Vital Signs Last 24 Hrs  T(C): 36.6 (29 Sep 2017 12:12), Max: 36.8 (29 Sep 2017 06:00)  T(F): 97.9 (29 Sep 2017 12:12), Max: 98.2 (29 Sep 2017 06:00)  HR: 81 (29 Sep 2017 12:12) (70 - 81)  BP: 108/65 (29 Sep 2017 12:12) (108/65 - 114/70)  BP(mean): --  RR: 18 (29 Sep 2017 12:12) (17 - 18)  SpO2: 95% (29 Sep 2017 12:12) (95% - 96%)    PHYSICAL EXAM:  General: [x ] non-toxic  HEAD/EYES: [ ] PERRL [x ] white sclera [ ] icterus  ENT:  [ ] normal [ x] supple [ ] thrush [ ] pharyngeal exudate  Cardiovascular:   [ ] murmur [ x] normal [ ] PPM/AICD  Respiratory:  [x ]course bs  GI:  [ ] soft, non-tender, normal bowel sounds  :  [ ] francisco [ ] no CVA tenderness   Musculoskeletal:  [ ] no synovitis  Neurologic:  [ ] non-focal exam   Skin:  [ x] no rash  Lymph: [ ] no lymphadenopathy  Psychiatric:  [x ] appropriate affect [ ] alert & oriented  Lines:  [x ] no phlebitis [ ] central line                                12.5   11.07 )-----------( 191      ( 29 Sep 2017 06:00 )             38.4       09-29    137  |  101  |  20  ----------------------------<  131<H>  4.7   |  25  |  0.75    Ca    9.3      29 Sep 2017 04:00  Phos  4.0     09-29  Mg     1.9     09-29            MICROBIOLOGY:    RADIOLOGY: no rashes.

## 2017-09-29 NOTE — PROGRESS NOTE ADULT - SUBJECTIVE AND OBJECTIVE BOX
Patient is a 62y old  Female who presents with a chief complaint of fluctuating sugars (20 Sep 2017 12:22)      SUBJECTIVE / OVERNIGHT EVENTS: Patient seen and examined by bedside, no acute distress noted . Patient afebrile, cough with phlegm persistent, denies SOB      MEDICATIONS  (STANDING):  aspirin  chewable 162 milliGRAM(s) Oral daily  heparin  Injectable 5000 Unit(s) SubCutaneous every 12 hours  insulin lispro (HumaLOG) corrective regimen sliding scale   SubCutaneous at bedtime  dextrose 5%. 1000 milliLiter(s) (50 mL/Hr) IV Continuous <Continuous>  dextrose 50% Injectable 12.5 Gram(s) IV Push once  dextrose 50% Injectable 25 Gram(s) IV Push once  dextrose 50% Injectable 25 Gram(s) IV Push once  dextrose 5%. 1000 milliLiter(s) (50 mL/Hr) IV Continuous <Continuous>  dextrose 50% Injectable 25 Gram(s) IV Push once  buDESOnide 160 MICROgram(s)/formoterol 4.5 MICROgram(s) Inhaler 2 Puff(s) Inhalation two times a day  montelukast 10 milliGRAM(s) Oral at bedtime  insulin lispro (HumaLOG) corrective regimen sliding scale   SubCutaneous three times a day before meals  clotrimazole Lozenge 1 Lozenge Oral five times a day  predniSONE   Tablet 40 milliGRAM(s) Oral daily  insulin glargine Injectable (LANTUS) 25 Unit(s) SubCutaneous at bedtime  nystatin Powder 1 Application(s) Topical daily  senna 1 Tablet(s) Oral daily  docusate sodium 100 milliGRAM(s) Oral two times a day  trimethoprim  160 mG/sulfamethoxazole 800 mG 1 Tablet(s) Oral two times a day  polyethylene glycol 3350 17 Gram(s) Oral daily  insulin lispro Injectable (HumaLOG) 16 Unit(s) SubCutaneous three times a day before meals    MEDICATIONS  (PRN):  dextrose Gel 1 Dose(s) Oral once PRN Blood Glucose LESS THAN 70 milliGRAM(s)/deciliter  glucagon  Injectable 1 milliGRAM(s) IntraMuscular once PRN Glucose LESS THAN 70 milligrams/deciliter  dextrose Gel 1 Dose(s) Oral once PRN Blood Glucose LESS THAN 70 milliGRAM(s)/deciliter  glucagon  Injectable 1 milliGRAM(s) IntraMuscular once PRN Glucose LESS THAN 70 milligrams/deciliter  melatonin 3 milliGRAM(s) Oral at bedtime PRN Insomnia  ALBUTerol    90 MICROgram(s) HFA Inhaler 2 Puff(s) Inhalation every 6 hours PRN Shortness of Breath and/or Wheezing      Vital Signs Last 24 Hrs  T(C): 36.6 (29 Sep 2017 12:12), Max: 36.8 (29 Sep 2017 06:00)  T(F): 97.9 (29 Sep 2017 12:12), Max: 98.2 (29 Sep 2017 06:00)  HR: 81 (29 Sep 2017 12:12) (70 - 81)  BP: 108/65 (29 Sep 2017 12:12) (108/65 - 114/70)  BP(mean): --  RR: 18 (29 Sep 2017 12:12) (17 - 18)  SpO2: 95% (29 Sep 2017 12:12) (95% - 96%)  CAPILLARY BLOOD GLUCOSE  260 (29 Sep 2017 12:12)  190 (29 Sep 2017 08:48)  114 (28 Sep 2017 22:27)  135 (28 Sep 2017 16:46)        I&O's Summary    PHYSICAL EXAM:  GENERAL: NAD, well-developed  HEAD:  Atraumatic, Normocephalic  EYES: EOMI, PERRLA, conjunctiva and sclera clear  NECK: Supple, No JVD  CHEST/LUNG: diffuse expiratory wheezing. breathing non labored   HEART: Regular rate and rhythm; No murmurs, rubs, or gallops  ABDOMEN: Soft, Nontender, Nondistended; Bowel sounds present  : no evidence of blistering lesion seen in vagina   EXTREMITIES:  No clubbing, cyanosis, or edema  PSYCH: AAOx3  NEUROLOGY: non-focal  SKIN: No rashes or lesions      LABS:                        12.5   11.07 )-----------( 191      ( 29 Sep 2017 06:00 )             38.4     09-29    137  |  101  |  20  ----------------------------<  131<H>  4.7   |  25  |  0.75    Ca    9.3      29 Sep 2017 04:00  Phos  4.0     09-29  Mg     1.9     09-29                RADIOLOGY & ADDITIONAL TESTS:    Imaging Personally Reviewed:    Consultant(s) Notes Reviewed:      Care Discussed with Consultants/Other Providers: ID, pulmonary

## 2017-09-29 NOTE — PROGRESS NOTE ADULT - PROBLEM SELECTOR PLAN 6
heparin sc for DVT prophylaxis

## 2017-09-29 NOTE — PROGRESS NOTE ADULT - PROVIDER SPECIALTY LIST ADULT
Dental
Endocrinology
Hospitalist
Infectious Disease
Pulmonology
Cardiology
Pulmonology
Hospitalist

## 2017-09-29 NOTE — PROGRESS NOTE ADULT - PROBLEM SELECTOR PROBLEM 3
Type 2 diabetes mellitus with hyperglycemia, without long-term current use of insulin

## 2017-09-29 NOTE — PROGRESS NOTE ADULT - PROBLEM SELECTOR PLAN 4
pt evaluated by dental for left palatal ulcer and  palatal yeast infection associated with poor denture hygiene.  improved with  clotrimazole lozenges and dental hygiene
heparin sc for DVT prophylaxis
pt evaluated by dental for left palatal ulcer and  palatal yeast infection associated with poor denture hygiene.  improved with  clotrimazole lozenges and dental hygiene
heparin sc for DVT prophylaxis
pt evaluated by dental for left palatal ulcer and  palatal yeast infection associated with poor denture hygiene.  improved with  clotrimazole lozenges and dental hygiene
pt evaluated by dental for left palatal ulcer and  palatal yeast infection associated with poor denture hygiene.  improved with  clotrimazole lozenges and dental hygiene

## 2017-09-29 NOTE — PROGRESS NOTE ADULT - ASSESSMENT
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
62-year-old woman with a history of Bronchiectasis,, frequent steroid and abx use, though no history of chronic steroids, and diabetes who presents with worsening cough, chest tightness, and pleuritic chest pain with wheezing consistent with asthma/bronchiectasis exacerbation. RSV positive, And new RUL cyst/vs cavity.     Cavitary lesion: Afb negative,  with intermediate quataferon  - but AFb culture positive x1 with negative smear.    - awaiting DNA probe pt hisotry is consitent with BRENTON but have to r/o TB.    Bronchiectasis;  Diagnosis of ABPA in the past but work up and history more consistent with just postinfectious bronchiectasis.  Exacerbation with RSV with possible superinfection with Stenotrophomonas.  - Due to AFB positive would switch from levaquin to Bactrim for Stenotrophomonas for a total of 14 days of abx  - Please provide aggressive Chest PT 4 times a day. Aerobika 4 times a day with nebs.    - Please provide Albuterol PRN   - Continue prednisone 40, duoneb q6, singulair qhs, symbicort    Rocael Faria MD PGY5  Pulmonary and Critical Care Fellow  p# [phone]
Assessment  1. Chest pain- pleuritic most likely secondary to pulmonary disease  2. Bronchiectasis  3. RVP +  4. History of aspergillosis  5. Uncontrolled diabetes mellitus      Plan  1. Stress MPI findings reviewed. Patient's symptoms are not cardiac in etiology. Discontinue telemetry.  2. Pulmonary consult appreciated. R/o TB. Continue airborne isolation.  3. IV steroids and antibiotics  4. Endocrine recommendations appreciated  5. There are no active cardiac issues and patient would benefit from being on the medicine service. MAR was contacted and patient will be evaluated for transfer.    Above plan discussed in detail with patient's daughter.
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
62 year old with broncheictasis with acute exacerbation due to rsv/stenotrohomonas    Agree with pulmonary plan to change to RSV.    AFB culture is less likely Tb- I d/w micor tech- pcr testing to assist with ID should be available tomorrow.
62 year old with broncheictasis with acute exacerbation due to rsv/stenotrohomonas    Continue Bactrim through 10/7 (total 14 day steno tx)    AFB pcr is negative for Tb.    Follow up with pulmonary    No I D contraindication to discharge    Call with questions.    Can follow up with me as needed s outpt.
62 year old with history of bronchiectasis presents with acute exacerbation/ cough.    this may be due to rsv    Pulmonary has concern for superimposed bacterial process.  Sputum culture with Stenotrophomonas    This may b e a colonizer in a patient with chronic lung disease.  I think acute exacerbation is more likely due to RSV but a trial of treatment is not unreasonable    Levaquin for 10 days.    Will sign off.
62 yr F with uncontrolled T2DM A1C 10.8% admitted for asthma/bronchiectasis exacerbation, R/O TB
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    # Pulmonary cavitary lesion  - CT imaging patient/daughter reports was done in the last 2 years (in [place]) would be helpful to determine chronicity of RL cavitary lesion.  - TB and aspergilloma/chronic aspergillosis not mutually exclusive. HIV negative. Chronic uncontrolled asthma with atopy also differential of previous aspergillosis dz (Pt used antihistamines daily to control symptoms of skin/eye pruritis.)  - c/w airborne/contact isolation. Quant gold indeterminate. AFB negative with short duration incubation. RSV positive, predisposed with chronic lung issues to superimposed bacterial infxn.  - Pending aspergillosis labs. Elevated leukocytosis unreliable in light of patients IV steroids.  - c/w ceftriaxone and azithromycin. Monitor for worsening rash, pruritis or drug rxn.   - Non-threatened respiratory status at this time. c/w chest PT for secretions. Agree with duoneb, advair, wean systemic steroids as appropriate.  - pulmonary and endocrine following.  - ID will continue to follow.    Jose De Jesus Morales MD  Medicine Intern rotating on ID service.  # [id number]  Please page [phone] if after 7:00 PM on weekdays or after 12 PM on weekends.
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
This is a 62-year-old woman with a history of Bronchiectasis, allergic bronchopulmonary aspergillosis (s/p Itraconazole 2015), frrequent steroid and abx use, though no history of chronic steroids, and diabetes who presents with worsening cough, chest tightness, and pleuritic chest pain with wheezing consistent with asthma/bronchiectasis exacerbation. RSV positive, And new RUL cavity.   -Pending  Check sputum culture, RVP, Urine legionella, HIV, Quantiferon gold, immunoglobulin panel (including IgG, IgE levels), aspergillus ab and aspergillus precipitins (orderd)  - Cw solemdorol 40iv q12, duoneb q6, singulair qhs  - please add Acapella or aerobica device and chest pt for sputum clearence  - Afbx3  - Cwt Ceftriaxone/Azithromycin pending cultures.   - Consider ID consult
62 yr F with uncontrolled T2DM A1C 10.8% admitted for asthma/bronchiectasis exacerbation
62-year-old woman with a history of Bronchiectasis,, frequent steroid and abx use, though no history of chronic steroids, and diabetes who presents with worsening cough, chest tightness, and pleuritic chest pain with wheezing consistent with asthma/bronchiectasis exacerbation. RSV positive, And new RUL cyst/vs cavity.     Cavitary lesion: Afb negative,  with intermediate quataferon  - Doubt active TB.  Please recheck quantaferon and ppd for latent TB.    Bronchiectasis;  Diagnosis of ABPA in the past but work up and history more consistent with just postinfectious bronchiectaisi.   - Now with exacerbation with RSV with possible superinfection with Stenotrophomonas.  - would complete a 14 day course of Levaquin for bronchiectasis exacerbation.   - Please provide aggressive Chest PT 4 times a day. Aerobika 4 times a day with nebs.    - Please provide Albuterol PRN   - Continue prednisone 40, duoneb q6, singulair qhs, symbicort    Itching: as per pt chronic requiring chronic home antihistamine use.   - please restart loratadine  Rocael Faria MD PGY5  Pulmonary and Critical Care Fellow  p# [phone]
This is a 62-year-old woman with a history of Bronchiectasis, allergic bronchopulmonary aspergillosis (s/p Itraconazole 2015), frequent steroid and abx use, though no history of chronic steroids, and diabetes who presents with worsening cough, chest tightness, and pleuritic chest pain with wheezing consistent with asthma/bronchiectasis exacerbation. RSV positive, And new RUL cyst/vs cavity.  Case discussed with radiology and RUL lesion appears more cystic and not highly typical of tb. That said TB is certainly still possible.    - would check one more AFB.     Bronchiectasis Workup sofar negative but hx of ABPA and frequent infections  - Please provide agressive Chest PT 4 times a day. Aerobika 4 times a day with nebs.    - Change solemdorol to prednisone 40 tomorrow, C/w duoneb q6, singulair qhs, symbicort  - f/u sputum CX and adjust abx appropriately if negative can d/c abx.
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

## 2017-09-29 NOTE — PROGRESS NOTE ADULT - PROBLEM SELECTOR PLAN 2
-likely chronic lesion from bronchiectasis  -Pt has Sputum cx x1 + for AFB, PCR negative as per microbiology, will f/u official result , Pt has hx of BRENTON in the past   -indeterminate quantiferon gold initially, repeat quant gold negative  PPD negative   -aspergillosis lab negative   ID recommend to continue  airborne isolation  while in house and to f/u at ID office with mask for first visit

## 2017-09-29 NOTE — PROGRESS NOTE ADULT - PROBLEM SELECTOR PLAN 3
-endo following   -uncontrolled DM-2 with A1c 10.8%  -insulin regimen adjusted by endo, FSBS controlled   -monitor FSBS   - pt on prednisone ,contributing to the hyperglycemia

## 2017-09-29 NOTE — PROGRESS NOTE ADULT - PROBLEM SELECTOR PROBLEM 1
Bronchiectasis with acute exacerbation
Type 2 diabetes mellitus with hyperglycemia, without long-term current use of insulin
Bronchiectasis with acute exacerbation

## 2017-09-29 NOTE — PROGRESS NOTE ADULT - PROBLEM SELECTOR PLAN 1
-improving clinically, cont prednisone 40 mg qd,  -bronchiectasis/asthma exacerbation exacerbated by RSV with superinfection from Stenotrophomonas colonization.  -serum immunoglobulin levels normal  - aspergillosis lab negative   -HIV negative  -sputum cx (9/21): stenotrophomonas resistant to ceftazidime but sensitive to levaquin and bactrim; . Pt has Sputum cx x1 + for AFB, Pulmonary recommend to change levaquin to bactrim for R/O TB  -microbiology notified that PCR was negative for AFB, case d/w ID and pulmonary,   ID recommend DC home with ABx to complete 14 day course  Pulmonary recommend sending another sample for AFB, for BRENTON, but pt does not need to stay in hospital to f/u results. Pulmonary also recommend 14 days of Abx and very slow taper of prednisone   -c/w bronchodilator duoneb q6h, singulair hs  -cont chest PT, Aerobika with nebs

## 2017-09-29 NOTE — PROGRESS NOTE ADULT - PROBLEM SELECTOR PROBLEM 4
Oral lesion
Need for prophylactic measure
Oral lesion
Need for prophylactic measure
Oral lesion
Oral lesion

## 2017-09-29 NOTE — PROGRESS NOTE ADULT - PROBLEM SELECTOR PROBLEM 2
Cavitary lesion of lung

## 2017-09-29 NOTE — PROGRESS NOTE ADULT - NSHPATTENDINGPLANDISCUSS_GEN_ALL_CORE
fellow
patient and team
patient and team
fellow
Patient and ADS NP

## 2017-09-30 LAB
CULTURE - ACID FAST SMEAR CONCENTRATED: SIGNIFICANT CHANGE UP
SPECIMEN SOURCE: SIGNIFICANT CHANGE UP

## 2017-10-13 ENCOUNTER — APPOINTMENT (OUTPATIENT)
Dept: ENDOCRINOLOGY | Facility: CLINIC | Age: 62
End: 2017-10-13

## 2017-10-16 ENCOUNTER — APPOINTMENT (OUTPATIENT)
Dept: ENDOCRINOLOGY | Facility: CLINIC | Age: 62
End: 2017-10-16
Payer: MEDICAID

## 2017-10-16 ENCOUNTER — RESULT CHARGE (OUTPATIENT)
Age: 62
End: 2017-10-16

## 2017-10-16 VITALS
SYSTOLIC BLOOD PRESSURE: 110 MMHG | WEIGHT: 108 LBS | DIASTOLIC BLOOD PRESSURE: 60 MMHG | HEART RATE: 76 BPM | HEIGHT: 64.75 IN | BODY MASS INDEX: 18.21 KG/M2 | OXYGEN SATURATION: 97 %

## 2017-10-16 DIAGNOSIS — Z00.00 ENCOUNTER FOR GENERAL ADULT MEDICAL EXAMINATION W/OUT ABNORMAL FINDINGS: ICD-10-CM

## 2017-10-16 DIAGNOSIS — Z86.39 PERSONAL HISTORY OF OTHER ENDOCRINE, NUTRITIONAL AND METABOLIC DISEASE: ICD-10-CM

## 2017-10-16 LAB — HBA1C MFR BLD HPLC: 8.7

## 2017-10-16 PROCEDURE — 99205 OFFICE O/P NEW HI 60 MIN: CPT

## 2017-10-16 RX ORDER — LANCING DEVICE
W/DEVICE EACH MISCELLANEOUS
Qty: 1 | Refills: 0 | Status: ACTIVE | COMMUNITY
Start: 2017-10-16 | End: 1900-01-01

## 2017-10-16 RX ORDER — INSULIN GLARGINE 100 [IU]/ML
100 INJECTION, SOLUTION SUBCUTANEOUS
Qty: 3 | Refills: 5 | Status: ACTIVE | COMMUNITY
Start: 2017-10-16 | End: 1900-01-01

## 2017-10-16 RX ORDER — METFORMIN HYDROCHLORIDE 1000 MG/1
1000 TABLET, COATED ORAL
Qty: 1 | Refills: 5 | Status: ACTIVE | COMMUNITY
Start: 2017-10-16 | End: 1900-01-01

## 2017-10-16 RX ORDER — STOOL SOFTENER 240 MG/1
CAPSULE, LIQUID FILLED ORAL
Refills: 0 | Status: ACTIVE | COMMUNITY

## 2017-10-16 RX ORDER — MONTELUKAST SODIUM 10 MG/1
10 TABLET, FILM COATED ORAL
Refills: 0 | Status: ACTIVE | COMMUNITY

## 2017-10-16 RX ORDER — LANCING DEVICE
EACH MISCELLANEOUS
Qty: 100 | Refills: 5 | Status: ACTIVE | COMMUNITY
Start: 2017-10-16 | End: 1900-01-01

## 2017-10-16 RX ORDER — SENNOSIDES 8.6 MG
187 TABLET ORAL
Refills: 0 | Status: ACTIVE | COMMUNITY

## 2017-10-16 RX ORDER — METFORMIN HYDROCHLORIDE 1000 MG/1
1000 TABLET, COATED ORAL
Refills: 0 | Status: ACTIVE | COMMUNITY

## 2017-10-16 RX ORDER — NYSTATIN 100000 [USP'U]/G
100000 CREAM TOPICAL
Refills: 0 | Status: ACTIVE | COMMUNITY

## 2017-10-30 LAB — ACID FAST STN SPT: SIGNIFICANT CHANGE UP

## 2017-10-31 LAB — ACID FAST STN SPT: SIGNIFICANT CHANGE UP

## 2017-11-01 LAB
ACID FAST STN SPT: SIGNIFICANT CHANGE UP
ACID FAST STN SPT: SIGNIFICANT CHANGE UP

## 2017-11-02 ENCOUNTER — APPOINTMENT (OUTPATIENT)
Dept: PULMONOLOGY | Facility: CLINIC | Age: 62
End: 2017-11-02
Payer: MEDICAID

## 2017-11-02 VITALS
DIASTOLIC BLOOD PRESSURE: 70 MMHG | HEIGHT: 63.79 IN | TEMPERATURE: 97.2 F | OXYGEN SATURATION: 95 % | SYSTOLIC BLOOD PRESSURE: 110 MMHG | BODY MASS INDEX: 17.91 KG/M2 | HEART RATE: 80 BPM | WEIGHT: 103.62 LBS | RESPIRATION RATE: 16 BRPM

## 2017-11-02 VITALS — WEIGHT: 106 LBS | BODY MASS INDEX: 18.32 KG/M2 | HEIGHT: 63.78 IN

## 2017-11-02 PROCEDURE — 94729 DIFFUSING CAPACITY: CPT

## 2017-11-02 PROCEDURE — ZZZZZ: CPT

## 2017-11-02 PROCEDURE — 94726 PLETHYSMOGRAPHY LUNG VOLUMES: CPT

## 2017-11-02 PROCEDURE — 94060 EVALUATION OF WHEEZING: CPT

## 2017-11-02 PROCEDURE — 99215 OFFICE O/P EST HI 40 MIN: CPT | Mod: 25

## 2017-11-05 LAB — ACID FAST STN SPT: SIGNIFICANT CHANGE UP

## 2017-11-06 ENCOUNTER — LABORATORY RESULT (OUTPATIENT)
Age: 62
End: 2017-11-06

## 2017-11-06 ENCOUNTER — APPOINTMENT (OUTPATIENT)
Dept: PULMONOLOGY | Facility: CLINIC | Age: 62
End: 2017-11-06

## 2017-11-06 ENCOUNTER — OUTPATIENT (OUTPATIENT)
Dept: OUTPATIENT SERVICES | Facility: HOSPITAL | Age: 62
LOS: 1 days | End: 2017-11-06
Payer: MEDICAID

## 2017-11-06 ENCOUNTER — APPOINTMENT (OUTPATIENT)
Dept: INFECTIOUS DISEASE | Facility: CLINIC | Age: 62
End: 2017-11-06
Payer: MEDICAID

## 2017-11-06 VITALS
DIASTOLIC BLOOD PRESSURE: 70 MMHG | HEIGHT: 63 IN | OXYGEN SATURATION: 96 % | SYSTOLIC BLOOD PRESSURE: 124 MMHG | HEART RATE: 85 BPM | TEMPERATURE: 98.7 F | WEIGHT: 109 LBS | BODY MASS INDEX: 19.31 KG/M2

## 2017-11-06 DIAGNOSIS — Z90.49 ACQUIRED ABSENCE OF OTHER SPECIFIED PARTS OF DIGESTIVE TRACT: Chronic | ICD-10-CM

## 2017-11-06 PROCEDURE — 99214 OFFICE O/P EST MOD 30 MIN: CPT

## 2017-11-06 PROCEDURE — G0463: CPT

## 2017-11-07 LAB
A FLAVUS AB FLD QL: NEGATIVE
A FUMIGATUS AB FLD QL: NEGATIVE
A NIGER AB FLD QL: NEGATIVE
ASPERGILLUS FLAVUS PRECIPITINS: NEGATIVE
ASPERGILLUS FUMIGATES PRECIPTINS: NEGATIVE
ASPERGILLUS NIGER PRECIPITINS: NEGATIVE
BASOPHILS # BLD AUTO: 0.07 K/UL
BASOPHILS NFR BLD AUTO: 0.7 %
EOSINOPHIL # BLD AUTO: 0.4 K/UL
EOSINOPHIL NFR BLD AUTO: 4 %
HCT VFR BLD CALC: 39.3 %
HGB BLD-MCNC: 12.5 G/DL
IMM GRANULOCYTES NFR BLD AUTO: 0.6 %
LYMPHOCYTES # BLD AUTO: 3.29 K/UL
LYMPHOCYTES NFR BLD AUTO: 33.1 %
MAN DIFF?: NORMAL
MCHC RBC-ENTMCNC: 27.1 PG
MCHC RBC-ENTMCNC: 31.8 GM/DL
MCV RBC AUTO: 85.2 FL
MONOCYTES # BLD AUTO: 0.71 K/UL
MONOCYTES NFR BLD AUTO: 7.1 %
NEUTROPHILS # BLD AUTO: 5.42 K/UL
NEUTROPHILS NFR BLD AUTO: 54.5 %
PLATELET # BLD AUTO: 237 K/UL
RBC # BLD: 4.61 M/UL
RBC # FLD: 15.4 %
WBC # FLD AUTO: 9.95 K/UL

## 2017-11-08 LAB
ANA PAT FLD IF-IMP: ABNORMAL
ANA SER IF-ACNC: ABNORMAL
BASOPHILS # BLD AUTO: 0.06 K/UL
BASOPHILS NFR BLD AUTO: 0.6 %
EOSINOPHIL # BLD AUTO: 0.55 K/UL
EOSINOPHIL NFR BLD AUTO: 5.8 %
HCT VFR BLD CALC: 38.6 %
HGB BLD-MCNC: 12.1 G/DL
IMM GRANULOCYTES NFR BLD AUTO: 0.4 %
LYMPHOCYTES # BLD AUTO: 1.96 K/UL
LYMPHOCYTES NFR BLD AUTO: 20.6 %
MAN DIFF?: NORMAL
MCHC RBC-ENTMCNC: 26.4 PG
MCHC RBC-ENTMCNC: 31.3 GM/DL
MCV RBC AUTO: 84.3 FL
MONOCYTES # BLD AUTO: 0.49 K/UL
MONOCYTES NFR BLD AUTO: 5.1 %
MPO AB + PR3 PNL SER: NORMAL
NEUTROPHILS # BLD AUTO: 6.43 K/UL
NEUTROPHILS NFR BLD AUTO: 67.5 %
PLATELET # BLD AUTO: 175 K/UL
PROCALCITONIN SERPL-MCNC: <0.05 NG/ML
RAPID RVP RESULT: DETECTED
RBC # BLD: 4.58 M/UL
RBC # FLD: 14.9 %
RV+EV RNA SPEC QL NAA+PROBE: DETECTED
WBC # FLD AUTO: 9.53 K/UL

## 2017-11-10 LAB — ACID FAST STN SPT: SIGNIFICANT CHANGE UP

## 2017-11-27 DIAGNOSIS — B97.89 OTHER VIRAL AGENTS AS THE CAUSE OF DISEASES CLASSIFIED ELSEWHERE: ICD-10-CM

## 2017-11-28 DIAGNOSIS — J47.1 BRONCHIECTASIS WITH (ACUTE) EXACERBATION: ICD-10-CM

## 2017-11-28 DIAGNOSIS — R93.8 ABNORMAL FINDINGS ON DIAGNOSTIC IMAGING OF OTHER SPECIFIED BODY STRUCTURES: ICD-10-CM

## 2017-12-04 LAB — CFTR MUT TESTED BLD/T: NORMAL

## 2017-12-05 ENCOUNTER — LABORATORY RESULT (OUTPATIENT)
Age: 62
End: 2017-12-05

## 2017-12-05 ENCOUNTER — APPOINTMENT (OUTPATIENT)
Dept: PULMONOLOGY | Facility: CLINIC | Age: 62
End: 2017-12-05
Payer: MEDICAID

## 2017-12-05 VITALS
SYSTOLIC BLOOD PRESSURE: 120 MMHG | WEIGHT: 105 LBS | BODY MASS INDEX: 18.61 KG/M2 | TEMPERATURE: 99.1 F | HEIGHT: 63 IN | RESPIRATION RATE: 16 BRPM | DIASTOLIC BLOOD PRESSURE: 66 MMHG | HEART RATE: 86 BPM

## 2017-12-05 PROCEDURE — 99215 OFFICE O/P EST HI 40 MIN: CPT

## 2017-12-07 ENCOUNTER — OUTPATIENT (OUTPATIENT)
Dept: OUTPATIENT SERVICES | Facility: HOSPITAL | Age: 62
LOS: 1 days | End: 2017-12-07
Payer: MEDICAID

## 2017-12-07 ENCOUNTER — LABORATORY RESULT (OUTPATIENT)
Age: 62
End: 2017-12-07

## 2017-12-07 ENCOUNTER — APPOINTMENT (OUTPATIENT)
Dept: CT IMAGING | Facility: IMAGING CENTER | Age: 62
End: 2017-12-07
Payer: MEDICAID

## 2017-12-07 ENCOUNTER — APPOINTMENT (OUTPATIENT)
Dept: PEDIATRIC ALLERGY IMMUNOLOGY | Facility: CLINIC | Age: 62
End: 2017-12-07
Payer: MEDICAID

## 2017-12-07 VITALS
BODY MASS INDEX: 18.43 KG/M2 | SYSTOLIC BLOOD PRESSURE: 147 MMHG | HEIGHT: 62.99 IN | HEART RATE: 81 BPM | OXYGEN SATURATION: 93 % | DIASTOLIC BLOOD PRESSURE: 85 MMHG | WEIGHT: 103.99 LBS

## 2017-12-07 DIAGNOSIS — Z84.89 FAMILY HISTORY OF OTHER SPECIFIED CONDITIONS: ICD-10-CM

## 2017-12-07 DIAGNOSIS — J30.89 OTHER ALLERGIC RHINITIS: ICD-10-CM

## 2017-12-07 DIAGNOSIS — Z13.228 ENCOUNTER FOR SCREENING FOR OTHER SUSPECTED ENDOCRINE DISORDER: ICD-10-CM

## 2017-12-07 DIAGNOSIS — Z13.29 ENCOUNTER FOR SCREENING FOR OTHER SUSPECTED ENDOCRINE DISORDER: ICD-10-CM

## 2017-12-07 DIAGNOSIS — Z13.0 ENCOUNTER FOR SCREENING FOR OTHER SUSPECTED ENDOCRINE DISORDER: ICD-10-CM

## 2017-12-07 DIAGNOSIS — Z90.49 ACQUIRED ABSENCE OF OTHER SPECIFIED PARTS OF DIGESTIVE TRACT: Chronic | ICD-10-CM

## 2017-12-07 DIAGNOSIS — Z00.8 ENCOUNTER FOR OTHER GENERAL EXAMINATION: ICD-10-CM

## 2017-12-07 DIAGNOSIS — Z83.6 FAMILY HISTORY OF OTHER DISEASES OF THE RESPIRATORY SYSTEM: ICD-10-CM

## 2017-12-07 PROCEDURE — 71250 CT THORAX DX C-: CPT

## 2017-12-07 PROCEDURE — 95004 PERQ TESTS W/ALRGNC XTRCS: CPT

## 2017-12-07 PROCEDURE — 71250 CT THORAX DX C-: CPT | Mod: 26

## 2017-12-07 PROCEDURE — 99204 OFFICE O/P NEW MOD 45 MIN: CPT | Mod: 25

## 2017-12-08 LAB
A1AT SERPL-MCNC: 157 MG/DL
BASOPHILS # BLD AUTO: 0.04 K/UL
BASOPHILS NFR BLD AUTO: 0.5 %
CD16+CD56+ CELLS # BLD: 353 /UL
CD16+CD56+ CELLS NFR BLD: 15 %
CD19 CELLS NFR BLD: 173 /UL
CD3 CELLS # BLD: 1905 /UL
CD3 CELLS NFR BLD: 77 %
CD3+CD4+ CELLS # BLD: 1314 /UL
CD3+CD4+ CELLS NFR BLD: 52 %
CD3+CD4+ CELLS/CD3+CD8+ CLL SPEC: 1.96 RATIO
CD3+CD8+ CELLS # SPEC: 670 /UL
CD3+CD8+ CELLS NFR BLD: 26 %
CELLS.CD3-CD19+/CELLS IN BLOOD: 7 %
DEPRECATED KAPPA LC FREE/LAMBDA SER: 1.59 RATIO
EOSINOPHIL # BLD AUTO: 0.33 K/UL
EOSINOPHIL NFR BLD AUTO: 3.8
HCT VFR BLD CALC: 37.5 %
HGB BLD-MCNC: 11.9 G/DL
IGA SER QL IEP: 255 MG/DL
IGE SER-MCNC: 9 IU/ML
IGG SER QL IEP: 1200 MG/DL
IGM SER QL IEP: 73 MG/DL
IMM GRANULOCYTES NFR BLD AUTO: 0.5 %
KAPPA LC CSF-MCNC: 1.81 MG/DL
KAPPA LC SERPL-MCNC: 2.88 MG/DL
LYMPHOCYTES # BLD AUTO: 2.35 K/UL
LYMPHOCYTES NFR BLD AUTO: 26.8 %
MAN DIFF?: NORMAL
MCHC RBC-ENTMCNC: 26.3 PG
MCHC RBC-ENTMCNC: 31.7 GM/DL
MCV RBC AUTO: 82.8 FL
MONOCYTES # BLD AUTO: 0.51 K/UL
MONOCYTES NFR BLD AUTO: 5.8 %
NEUTROPHILS # BLD AUTO: 5.51 K/UL
NEUTROPHILS NFR BLD AUTO: 62.6 %
PLATELET # BLD AUTO: 189 K/UL
RBC # BLD: 4.53 M/UL
RBC # FLD: 14.8 %
WBC # FLD AUTO: 8.78 K/UL

## 2017-12-12 ENCOUNTER — APPOINTMENT (OUTPATIENT)
Dept: PULMONOLOGY | Facility: CLINIC | Age: 62
End: 2017-12-12
Payer: MEDICAID

## 2017-12-12 VITALS
RESPIRATION RATE: 15 BRPM | HEART RATE: 83 BPM | DIASTOLIC BLOOD PRESSURE: 70 MMHG | OXYGEN SATURATION: 95 % | SYSTOLIC BLOOD PRESSURE: 130 MMHG | BODY MASS INDEX: 19.14 KG/M2 | HEIGHT: 62 IN | WEIGHT: 104 LBS | TEMPERATURE: 97.1 F

## 2017-12-12 PROCEDURE — 99215 OFFICE O/P EST HI 40 MIN: CPT

## 2017-12-13 LAB
A ALTERNATA IGE QN: <0.1 KUA/L
A FUMIGATUS IGE QN: <0.1 KUA/L
C DIPHTHERIAE AB SER QL: 1.2 IU/ML
C HERBARUM IGE QN: <0.1 KUA/L
C TETANI IGG SER-ACNC: 2.45 IU/ML
CH50 SERPL-MCNC: 58 U/ML
DEPRECATED A ALTERNATA IGE RAST QL: 0
DEPRECATED A FUMIGATUS IGE RAST QL: 0
DEPRECATED C HERBARUM IGE RAST QL: 0
DEPRECATED P NOTATUM IGE RAST QL: 0
DEPRECATED S ROSTRATA IGE RAST QL: 0
MANNAN BINDING LECTIN (MBL): 98 NG/ML
P NOTATUM IGE QN: <0.1 KUA/L
S ROSTRATA IGE QN: <0.1 KUA/L

## 2017-12-17 LAB
COMPLEMENT, ALTERNATE PATHWAY (AH50): 82
DEPRECATED S PNEUM 1 IGG SER-MCNC: 9.3 MCG/ML
DEPRECATED S PNEUM12 AB SER-ACNC: 0.3 MCG/ML
DEPRECATED S PNEUM14 AB SER-ACNC: 10.9 MCG/ML
DEPRECATED S PNEUM17 IGG SER IA-MCNC: 10.8 MCG/ML
DEPRECATED S PNEUM18 IGG SER IA-MCNC: 30.4 MCG/ML
DEPRECATED S PNEUM19 IGG SER-MCNC: 20.4 MCG/ML
DEPRECATED S PNEUM19 IGG SER-MCNC: 28.6 MCG/ML
DEPRECATED S PNEUM2 IGG SER-MCNC: 1.8 MCG/ML
DEPRECATED S PNEUM20 IGG SER-MCNC: 0.8 MCG/ML
DEPRECATED S PNEUM22 IGG SER-MCNC: 5.3 MCG/ML
DEPRECATED S PNEUM23 AB SER-ACNC: 6.1 MCG/ML
DEPRECATED S PNEUM3 AB SER-ACNC: 49.8 MCG/ML
DEPRECATED S PNEUM34 IGG SER-MCNC: 0.3 MCG/ML
DEPRECATED S PNEUM4 AB SER-ACNC: 5 MCG/ML
DEPRECATED S PNEUM5 IGG SER-MCNC: 60.2 MCG/ML
DEPRECATED S PNEUM6 IGG SER-MCNC: 144.5 MCG/ML
DEPRECATED S PNEUM7 IGG SER-ACNC: 4.5 MCG/ML
DEPRECATED S PNEUM8 AB SER-ACNC: 5.6 MCG/ML
DEPRECATED S PNEUM9 AB SER-ACNC: 3.2 MCG/ML
DEPRECATED S PNEUM9 IGG SER-MCNC: 18.4 MCG/ML
HAEM INFLU B AB SER-MCNC: 0.19 MG/L
STREPTOCOCCUS PNEUMONIAE SEROTYPE 11A: 4.3 MCG/ML
STREPTOCOCCUS PNEUMONIAE SEROTYPE 15B: 1 MCG/ML
STREPTOCOCCUS PNEUMONIAE SEROTYPE 33F: 3.6 MCG/ML

## 2017-12-28 ENCOUNTER — APPOINTMENT (OUTPATIENT)
Dept: PEDIATRIC ALLERGY IMMUNOLOGY | Facility: CLINIC | Age: 62
End: 2017-12-28

## 2018-01-02 LAB
A ALTERNATA IGE QN: <0.1 KUA/L
A FLAVUS AB FLD QL: NEGATIVE
A FUMIGATUS AB FLD QL: NEGATIVE
A FUMIGATUS IGE QN: <0.1 KUA/L
A NIGER AB FLD QL: NEGATIVE
ASPERGILLUS FLAVUS PRECIPITINS: NEGATIVE
ASPERGILLUS FUMIGATES PRECIPTINS: NEGATIVE
ASPERGILLUS NIGER PRECIPITINS: NEGATIVE
C ALBICANS IGE QN: <0.1 KUA/L
C HERBARUM IGE QN: <0.1 KUA/L
CAT DANDER IGE QN: <0.1 KUA/L
COMMON RAGWEED IGE QN: <0.1 KUA/L
D FARINAE IGE QN: <0.1 KUA/L
D PTERONYSS IGE QN: <0.1 KUA/L
DEPRECATED A ALTERNATA IGE RAST QL: 0
DEPRECATED A FUMIGATUS IGE RAST QL: 0
DEPRECATED C ALBICANS IGE RAST QL: 0
DEPRECATED C HERBARUM IGE RAST QL: 0
DEPRECATED CAT DANDER IGE RAST QL: 0
DEPRECATED COMMON RAGWEED IGE RAST QL: 0
DEPRECATED D FARINAE IGE RAST QL: 0
DEPRECATED D PTERONYSS IGE RAST QL: 0
DEPRECATED DOG DANDER IGE RAST QL: 0
DEPRECATED M RACEMOSUS IGE RAST QL: 0
DEPRECATED ROACH IGE RAST QL: 0
DEPRECATED TIMOTHY IGE RAST QL: 0
DEPRECATED WHITE OAK IGE RAST QL: 0
DOG DANDER IGE QN: <0.1 KUA/L
M RACEMOSUS IGE QN: <0.1 KUA/L
ROACH IGE QN: <0.1 KUA/L
TIMOTHY IGE QN: <0.1 KUA/L
WHITE OAK IGE QN: <0.1 KUA/L

## 2018-01-17 ENCOUNTER — APPOINTMENT (OUTPATIENT)
Dept: ENDOCRINOLOGY | Facility: CLINIC | Age: 63
End: 2018-01-17

## 2018-01-22 NOTE — DIETITIAN INITIAL EVALUATION ADULT. - NS AS NUTRI DX NUTRIENT
Patient is here today for a blood pressure check, blood pressure read today 138/84. Patient was also here today for a weight check, ,patient's weight was 96.8 kg. Patient would like a refill on medication   phentermine (ADIPEX-P) 37.5 MG tablet    Note printed and routed for MD to review and advise.      Severe./Malnutrition

## 2018-03-22 ENCOUNTER — APPOINTMENT (OUTPATIENT)
Dept: PULMONOLOGY | Facility: CLINIC | Age: 63
End: 2018-03-22

## 2018-05-25 ENCOUNTER — OTHER (OUTPATIENT)
Age: 63
End: 2018-05-25

## 2018-06-05 ENCOUNTER — APPOINTMENT (OUTPATIENT)
Dept: PULMONOLOGY | Facility: CLINIC | Age: 63
End: 2018-06-05
Payer: MEDICAID

## 2018-06-05 VITALS
SYSTOLIC BLOOD PRESSURE: 130 MMHG | RESPIRATION RATE: 18 BRPM | OXYGEN SATURATION: 96 % | HEIGHT: 62 IN | HEART RATE: 84 BPM | BODY MASS INDEX: 18.58 KG/M2 | TEMPERATURE: 97.9 F | DIASTOLIC BLOOD PRESSURE: 70 MMHG | WEIGHT: 101 LBS

## 2018-06-05 PROCEDURE — 99214 OFFICE O/P EST MOD 30 MIN: CPT

## 2018-06-11 LAB — BACTERIA SPT CULT: ABNORMAL

## 2018-07-03 ENCOUNTER — APPOINTMENT (OUTPATIENT)
Dept: PULMONOLOGY | Facility: CLINIC | Age: 63
End: 2018-07-03
Payer: MEDICAID

## 2018-07-03 VITALS
SYSTOLIC BLOOD PRESSURE: 124 MMHG | HEART RATE: 78 BPM | OXYGEN SATURATION: 95 % | HEIGHT: 62 IN | TEMPERATURE: 97.4 F | BODY MASS INDEX: 18.03 KG/M2 | WEIGHT: 98 LBS | DIASTOLIC BLOOD PRESSURE: 60 MMHG | RESPIRATION RATE: 15 BRPM

## 2018-07-03 PROCEDURE — 99215 OFFICE O/P EST HI 40 MIN: CPT

## 2018-07-03 RX ORDER — LEVOFLOXACIN 500 MG/1
500 TABLET, FILM COATED ORAL DAILY
Qty: 14 | Refills: 0 | Status: DISCONTINUED | COMMUNITY
Start: 2018-06-05 | End: 2018-07-03

## 2018-07-03 RX ORDER — ALBUTEROL SULFATE 2.5 MG/3ML
(2.5 MG/3ML) SOLUTION RESPIRATORY (INHALATION)
Qty: 150 | Refills: 0 | Status: DISCONTINUED | COMMUNITY
Start: 2017-08-24

## 2018-07-03 RX ORDER — BUDESONIDE 0.5 MG/2ML
0.5 INHALANT ORAL TWICE DAILY
Qty: 2 | Refills: 4 | Status: DISCONTINUED | COMMUNITY
Start: 2017-12-05 | End: 2018-07-03

## 2018-07-07 LAB
A FLAVUS AB FLD QL: NEGATIVE
A FUMIGATUS AB FLD QL: NEGATIVE
A NIGER AB FLD QL: NEGATIVE
BASOPHILS # BLD AUTO: 0.02 K/UL
BASOPHILS NFR BLD AUTO: 0.2 %
EOSINOPHIL # BLD AUTO: 0.05 K/UL
EOSINOPHIL NFR BLD AUTO: 0.6 %
HCT VFR BLD CALC: 36.3 %
HGB BLD-MCNC: 11.1 G/DL
IMM GRANULOCYTES NFR BLD AUTO: 0.4 %
LYMPHOCYTES # BLD AUTO: 1.67 K/UL
LYMPHOCYTES NFR BLD AUTO: 18.6 %
MAN DIFF?: NORMAL
MCHC RBC-ENTMCNC: 23.3 PG
MCHC RBC-ENTMCNC: 30.6 GM/DL
MCV RBC AUTO: 76.3 FL
MONOCYTES # BLD AUTO: 0.36 K/UL
MONOCYTES NFR BLD AUTO: 4 %
NEUTROPHILS # BLD AUTO: 6.85 K/UL
NEUTROPHILS NFR BLD AUTO: 76.2 %
PLATELET # BLD AUTO: 213 K/UL
RBC # BLD: 4.76 M/UL
RBC # FLD: 16.7 %
WBC # FLD AUTO: 8.99 K/UL

## 2018-07-13 LAB — BACTERIA SPT CF RESP CULT: ABNORMAL

## 2018-07-30 ENCOUNTER — INPATIENT (INPATIENT)
Facility: HOSPITAL | Age: 63
LOS: 7 days | Discharge: HOME | End: 2018-08-07
Attending: INTERNAL MEDICINE | Admitting: INTERNAL MEDICINE
Payer: MEDICAID

## 2018-07-30 VITALS
SYSTOLIC BLOOD PRESSURE: 124 MMHG | OXYGEN SATURATION: 100 % | DIASTOLIC BLOOD PRESSURE: 78 MMHG | TEMPERATURE: 98 F | RESPIRATION RATE: 16 BRPM | HEART RATE: 83 BPM

## 2018-07-30 DIAGNOSIS — N89.8 OTHER SPECIFIED NONINFLAMMATORY DISORDERS OF VAGINA: ICD-10-CM

## 2018-07-30 DIAGNOSIS — Z29.9 ENCOUNTER FOR PROPHYLACTIC MEASURES, UNSPECIFIED: ICD-10-CM

## 2018-07-30 DIAGNOSIS — E11.9 TYPE 2 DIABETES MELLITUS WITHOUT COMPLICATIONS: ICD-10-CM

## 2018-07-30 DIAGNOSIS — R05 COUGH: ICD-10-CM

## 2018-07-30 DIAGNOSIS — J47.9 BRONCHIECTASIS, UNCOMPLICATED: ICD-10-CM

## 2018-07-30 DIAGNOSIS — I10 ESSENTIAL (PRIMARY) HYPERTENSION: ICD-10-CM

## 2018-07-30 DIAGNOSIS — Z90.49 ACQUIRED ABSENCE OF OTHER SPECIFIED PARTS OF DIGESTIVE TRACT: Chronic | ICD-10-CM

## 2018-07-30 LAB
ALBUMIN SERPL ELPH-MCNC: 3.6 G/DL — SIGNIFICANT CHANGE UP (ref 3.3–5)
ALP SERPL-CCNC: 147 U/L — HIGH (ref 40–120)
ALT FLD-CCNC: 19 U/L — SIGNIFICANT CHANGE UP (ref 4–33)
AST SERPL-CCNC: 29 U/L — SIGNIFICANT CHANGE UP (ref 4–32)
B PERT DNA SPEC QL NAA+PROBE: SIGNIFICANT CHANGE UP
BASE EXCESS BLDV CALC-SCNC: 2.7 MMOL/L — SIGNIFICANT CHANGE UP
BASOPHILS # BLD AUTO: 0.09 K/UL — SIGNIFICANT CHANGE UP (ref 0–0.2)
BASOPHILS NFR BLD AUTO: 1 % — SIGNIFICANT CHANGE UP (ref 0–2)
BILIRUB SERPL-MCNC: < 0.2 MG/DL — LOW (ref 0.2–1.2)
BLOOD GAS VENOUS - CREATININE: 0.45 MG/DL — LOW (ref 0.5–1.3)
BUN SERPL-MCNC: 10 MG/DL — SIGNIFICANT CHANGE UP (ref 7–23)
C PNEUM DNA SPEC QL NAA+PROBE: NOT DETECTED — SIGNIFICANT CHANGE UP
CALCIUM SERPL-MCNC: 9.4 MG/DL — SIGNIFICANT CHANGE UP (ref 8.4–10.5)
CHLORIDE BLDV-SCNC: 105 MMOL/L — SIGNIFICANT CHANGE UP (ref 96–108)
CHLORIDE SERPL-SCNC: 103 MMOL/L — SIGNIFICANT CHANGE UP (ref 98–107)
CO2 SERPL-SCNC: 23 MMOL/L — SIGNIFICANT CHANGE UP (ref 22–31)
CREAT SERPL-MCNC: 0.45 MG/DL — LOW (ref 0.5–1.3)
EOSINOPHIL # BLD AUTO: 0.43 K/UL — SIGNIFICANT CHANGE UP (ref 0–0.5)
EOSINOPHIL NFR BLD AUTO: 5 % — SIGNIFICANT CHANGE UP (ref 0–6)
FLUAV H1 2009 PAND RNA SPEC QL NAA+PROBE: NOT DETECTED — SIGNIFICANT CHANGE UP
FLUAV H1 RNA SPEC QL NAA+PROBE: NOT DETECTED — SIGNIFICANT CHANGE UP
FLUAV H3 RNA SPEC QL NAA+PROBE: NOT DETECTED — SIGNIFICANT CHANGE UP
FLUAV SUBTYP SPEC NAA+PROBE: SIGNIFICANT CHANGE UP
FLUBV RNA SPEC QL NAA+PROBE: NOT DETECTED — SIGNIFICANT CHANGE UP
GAS PNL BLDV: 137 MMOL/L — SIGNIFICANT CHANGE UP (ref 136–146)
GLUCOSE BLDV-MCNC: 108 — HIGH (ref 70–99)
GLUCOSE SERPL-MCNC: 114 MG/DL — HIGH (ref 70–99)
HADV DNA SPEC QL NAA+PROBE: NOT DETECTED — SIGNIFICANT CHANGE UP
HCO3 BLDV-SCNC: 26 MMOL/L — SIGNIFICANT CHANGE UP (ref 20–27)
HCOV 229E RNA SPEC QL NAA+PROBE: NOT DETECTED — SIGNIFICANT CHANGE UP
HCOV HKU1 RNA SPEC QL NAA+PROBE: NOT DETECTED — SIGNIFICANT CHANGE UP
HCOV NL63 RNA SPEC QL NAA+PROBE: NOT DETECTED — SIGNIFICANT CHANGE UP
HCOV OC43 RNA SPEC QL NAA+PROBE: NOT DETECTED — SIGNIFICANT CHANGE UP
HCT VFR BLD CALC: 34.3 % — LOW (ref 34.5–45)
HCT VFR BLD CALC: 34.3 % — LOW (ref 34.5–45)
HCT VFR BLDV CALC: 34.4 % — LOW (ref 34.5–45)
HGB BLD-MCNC: 10.5 G/DL — LOW (ref 11.5–15.5)
HGB BLD-MCNC: 10.5 G/DL — LOW (ref 11.5–15.5)
HGB BLDV-MCNC: 11.2 G/DL — LOW (ref 11.5–15.5)
HMPV RNA SPEC QL NAA+PROBE: NOT DETECTED — SIGNIFICANT CHANGE UP
HPIV1 RNA SPEC QL NAA+PROBE: NOT DETECTED — SIGNIFICANT CHANGE UP
HPIV2 RNA SPEC QL NAA+PROBE: NOT DETECTED — SIGNIFICANT CHANGE UP
HPIV3 RNA SPEC QL NAA+PROBE: NOT DETECTED — SIGNIFICANT CHANGE UP
HPIV4 RNA SPEC QL NAA+PROBE: NOT DETECTED — SIGNIFICANT CHANGE UP
IMM GRANULOCYTES # BLD AUTO: 0.04 # — SIGNIFICANT CHANGE UP
IMM GRANULOCYTES NFR BLD AUTO: 0.5 % — SIGNIFICANT CHANGE UP (ref 0–1.5)
LACTATE BLDV-MCNC: 1.7 MMOL/L — SIGNIFICANT CHANGE UP (ref 0.5–2)
LIDOCAIN IGE QN: 19.7 U/L — SIGNIFICANT CHANGE UP (ref 7–60)
LYMPHOCYTES # BLD AUTO: 2.29 K/UL — SIGNIFICANT CHANGE UP (ref 1–3.3)
LYMPHOCYTES # BLD AUTO: 26.6 % — SIGNIFICANT CHANGE UP (ref 13–44)
M PNEUMO DNA SPEC QL NAA+PROBE: NOT DETECTED — SIGNIFICANT CHANGE UP
MCHC RBC-ENTMCNC: 23 PG — LOW (ref 27–34)
MCHC RBC-ENTMCNC: 23 PG — LOW (ref 27–34)
MCHC RBC-ENTMCNC: 30.6 % — LOW (ref 32–36)
MCHC RBC-ENTMCNC: 30.6 % — LOW (ref 32–36)
MCV RBC AUTO: 75.1 FL — LOW (ref 80–100)
MCV RBC AUTO: 75.1 FL — LOW (ref 80–100)
MONOCYTES # BLD AUTO: 0.72 K/UL — SIGNIFICANT CHANGE UP (ref 0–0.9)
MONOCYTES NFR BLD AUTO: 8.4 % — SIGNIFICANT CHANGE UP (ref 2–14)
NEUTROPHILS # BLD AUTO: 5.05 K/UL — SIGNIFICANT CHANGE UP (ref 1.8–7.4)
NEUTROPHILS NFR BLD AUTO: 58.5 % — SIGNIFICANT CHANGE UP (ref 43–77)
NRBC # FLD: 0 — SIGNIFICANT CHANGE UP
NRBC # FLD: 0 — SIGNIFICANT CHANGE UP
PCO2 BLDV: 51 MMHG — SIGNIFICANT CHANGE UP (ref 41–51)
PH BLDV: 7.36 PH — SIGNIFICANT CHANGE UP (ref 7.32–7.43)
PLATELET # BLD AUTO: 188 K/UL — SIGNIFICANT CHANGE UP (ref 150–400)
PLATELET # BLD AUTO: 188 K/UL — SIGNIFICANT CHANGE UP (ref 150–400)
PMV BLD: 10.9 FL — SIGNIFICANT CHANGE UP (ref 7–13)
PMV BLD: 10.9 FL — SIGNIFICANT CHANGE UP (ref 7–13)
PO2 BLDV: 36 MMHG — SIGNIFICANT CHANGE UP (ref 35–40)
POTASSIUM BLDV-SCNC: 3.9 MMOL/L — SIGNIFICANT CHANGE UP (ref 3.4–4.5)
POTASSIUM SERPL-MCNC: 4.1 MMOL/L — SIGNIFICANT CHANGE UP (ref 3.5–5.3)
POTASSIUM SERPL-SCNC: 4.1 MMOL/L — SIGNIFICANT CHANGE UP (ref 3.5–5.3)
PROT SERPL-MCNC: 7.1 G/DL — SIGNIFICANT CHANGE UP (ref 6–8.3)
RBC # BLD: 4.57 M/UL — SIGNIFICANT CHANGE UP (ref 3.8–5.2)
RBC # BLD: 4.57 M/UL — SIGNIFICANT CHANGE UP (ref 3.8–5.2)
RBC # FLD: 17.7 % — HIGH (ref 10.3–14.5)
RBC # FLD: 17.7 % — HIGH (ref 10.3–14.5)
RSV RNA SPEC QL NAA+PROBE: NOT DETECTED — SIGNIFICANT CHANGE UP
RV+EV RNA SPEC QL NAA+PROBE: NOT DETECTED — SIGNIFICANT CHANGE UP
SAO2 % BLDV: 55.7 % — LOW (ref 60–85)
SODIUM SERPL-SCNC: 139 MMOL/L — SIGNIFICANT CHANGE UP (ref 135–145)
WBC # BLD: 8.63 K/UL — SIGNIFICANT CHANGE UP (ref 3.8–10.5)
WBC # BLD: 8.63 K/UL — SIGNIFICANT CHANGE UP (ref 3.8–10.5)
WBC # FLD AUTO: 8.63 K/UL — SIGNIFICANT CHANGE UP (ref 3.8–10.5)
WBC # FLD AUTO: 8.63 K/UL — SIGNIFICANT CHANGE UP (ref 3.8–10.5)

## 2018-07-30 PROCEDURE — 74177 CT ABD & PELVIS W/CONTRAST: CPT | Mod: 26

## 2018-07-30 PROCEDURE — 71046 X-RAY EXAM CHEST 2 VIEWS: CPT | Mod: 26

## 2018-07-30 PROCEDURE — 99223 1ST HOSP IP/OBS HIGH 75: CPT

## 2018-07-30 RX ORDER — DEXTROSE 50 % IN WATER 50 %
12.5 SYRINGE (ML) INTRAVENOUS ONCE
Qty: 0 | Refills: 0 | Status: DISCONTINUED | OUTPATIENT
Start: 2018-07-30 | End: 2018-08-07

## 2018-07-30 RX ORDER — ENOXAPARIN SODIUM 100 MG/ML
40 INJECTION SUBCUTANEOUS EVERY 24 HOURS
Qty: 0 | Refills: 0 | Status: DISCONTINUED | OUTPATIENT
Start: 2018-07-30 | End: 2018-08-07

## 2018-07-30 RX ORDER — SODIUM CHLORIDE 9 MG/ML
1000 INJECTION, SOLUTION INTRAVENOUS
Qty: 0 | Refills: 0 | Status: DISCONTINUED | OUTPATIENT
Start: 2018-07-30 | End: 2018-08-07

## 2018-07-30 RX ORDER — ENOXAPARIN SODIUM 100 MG/ML
40 INJECTION SUBCUTANEOUS EVERY 24 HOURS
Qty: 0 | Refills: 0 | Status: DISCONTINUED | OUTPATIENT
Start: 2018-07-30 | End: 2018-07-30

## 2018-07-30 RX ORDER — FAMOTIDINE 10 MG/ML
20 INJECTION INTRAVENOUS ONCE
Qty: 0 | Refills: 0 | Status: COMPLETED | OUTPATIENT
Start: 2018-07-30 | End: 2018-07-30

## 2018-07-30 RX ORDER — IPRATROPIUM/ALBUTEROL SULFATE 18-103MCG
3 AEROSOL WITH ADAPTER (GRAM) INHALATION ONCE
Qty: 0 | Refills: 0 | Status: DISCONTINUED | OUTPATIENT
Start: 2018-07-30 | End: 2018-07-31

## 2018-07-30 RX ORDER — DEXTROSE 50 % IN WATER 50 %
25 SYRINGE (ML) INTRAVENOUS ONCE
Qty: 0 | Refills: 0 | Status: DISCONTINUED | OUTPATIENT
Start: 2018-07-30 | End: 2018-08-07

## 2018-07-30 RX ORDER — INSULIN LISPRO 100/ML
VIAL (ML) SUBCUTANEOUS AT BEDTIME
Qty: 0 | Refills: 0 | Status: DISCONTINUED | OUTPATIENT
Start: 2018-07-30 | End: 2018-08-07

## 2018-07-30 RX ORDER — DEXTROSE 50 % IN WATER 50 %
15 SYRINGE (ML) INTRAVENOUS ONCE
Qty: 0 | Refills: 0 | Status: DISCONTINUED | OUTPATIENT
Start: 2018-07-30 | End: 2018-08-07

## 2018-07-30 RX ORDER — IPRATROPIUM/ALBUTEROL SULFATE 18-103MCG
3 AEROSOL WITH ADAPTER (GRAM) INHALATION ONCE
Qty: 0 | Refills: 0 | Status: COMPLETED | OUTPATIENT
Start: 2018-07-30 | End: 2018-07-30

## 2018-07-30 RX ORDER — GLUCAGON INJECTION, SOLUTION 0.5 MG/.1ML
1 INJECTION, SOLUTION SUBCUTANEOUS ONCE
Qty: 0 | Refills: 0 | Status: DISCONTINUED | OUTPATIENT
Start: 2018-07-30 | End: 2018-08-07

## 2018-07-30 RX ORDER — INSULIN LISPRO 100/ML
VIAL (ML) SUBCUTANEOUS
Qty: 0 | Refills: 0 | Status: DISCONTINUED | OUTPATIENT
Start: 2018-07-30 | End: 2018-08-07

## 2018-07-30 RX ORDER — IPRATROPIUM/ALBUTEROL SULFATE 18-103MCG
3 AEROSOL WITH ADAPTER (GRAM) INHALATION EVERY 6 HOURS
Qty: 0 | Refills: 0 | Status: DISCONTINUED | OUTPATIENT
Start: 2018-07-30 | End: 2018-08-03

## 2018-07-30 RX ADMIN — Medication 3 MILLILITER(S): at 12:19

## 2018-07-30 RX ADMIN — Medication 125 MILLIGRAM(S): at 12:19

## 2018-07-30 RX ADMIN — FAMOTIDINE 20 MILLIGRAM(S): 10 INJECTION INTRAVENOUS at 12:33

## 2018-07-30 NOTE — H&P ADULT - PROBLEM SELECTOR PLAN 6
- DVT ppx with SQ lovenox  - Diet: Carb consistent  - Will get Nutrition and PT consult Chronic. Can be 2/2 yeast infection given frequent steroid use vs. vaginal atrophy.  - Will do nystatin powder  - Check UA Chronic. Can be 2/2 yeast infection given frequent steroid use vs. vaginal atrophy.  - Will do clotrimazole cream  - Check UA

## 2018-07-30 NOTE — H&P ADULT - NSHPREVIEWOFSYSTEMS_GEN_ALL_CORE
REVIEW OF SYSTEMS:    CONSTITUTIONAL: No weakness, fevers or chills  EYES/ENT: No visual changes;  No vertigo or throat pain   NECK: No pain or stiffness  RESPIRATORY: No cough, wheezing, hemoptysis; No shortness of breath  CARDIOVASCULAR: No chest pain or palpitations  GASTROINTESTINAL: No abdominal or epigastric pain. No nausea, vomiting, or hematemesis; No diarrhea or constipation. No melena or hematochezia.  GENITOURINARY: No dysuria, frequency or hematuria  NEUROLOGICAL: No numbness or weakness  SKIN: No itching, burning, rashes, or lesions   All other review of systems is negative unless indicated above. REVIEW OF SYSTEMS:    CONSTITUTIONAL: +Weakness, +weight loss, +fevers  EYES/ENT: No visual changes. No throat pain, dysphagia, or odynophagia.  NECK: No pain or stiffness  RESPIRATORY: +Productive cough, SOB, wheezing. No hemoptysis.  CARDIOVASCULAR: +Musculoskeletal CP. No palpitations.  GASTROINTESTINAL: +Musculoskeletal pain in abdomen. +Constipation. No nausea, vomiting, or hematemesis. No diarrhea. No melena or hematochezia.  GENITOURINARY: +Chronic dysuria. No urinary urgency/frequency. No hematuria.  NEUROLOGICAL: No numbness/tingling  SKIN: No itching, burning, or rashes  All other review of systems is negative unless indicated above. REVIEW OF SYSTEMS:    CONSTITUTIONAL: +Weakness, +weight loss, +fevers  EYES/ENT: No visual changes. No throat pain, dysphagia, or odynophagia.  NECK: No pain or stiffness  RESPIRATORY: +Productive cough, SOB, wheezing. No hemoptysis.  CARDIOVASCULAR: +Musculoskeletal CP. No palpitations.  GASTROINTESTINAL: +Musculoskeletal pain in abdomen. +Constipation. No nausea, vomiting, or hematemesis. No diarrhea. No melena or hematochezia.  GENITOURINARY: +Chronic dysuria with vaginal itching. No urinary urgency/frequency. No hematuria.  NEUROLOGICAL: No numbness/tingling  SKIN: No itching, burning, or rashes  All other review of systems is negative unless indicated above.

## 2018-07-30 NOTE — PATIENT PROFILE ADULT. - FUNCTIONAL SCREEN CURRENT LEVEL: COMMUNICATION, MLM
with family help and  phone and understands some english/(0) understands/communicates without difficulty

## 2018-07-30 NOTE — H&P ADULT - HISTORY OF PRESENT ILLNESS
64 yo woman with history of asthma, allergic bronchopulmonary aspergillosis (s/p Itraconazole in 2015), bronchiectasis, RUL cavitary lesion, and DM2 presents with fevers/chills, SOB, cough productive of yellow sputum.    In the ED, T 98.3, HR 83, /78, RR 16, and O2 sat 100% RA. Labs with no leukocytosis, RVP negative. 62 yo woman with history of allergic bronchopulmonary aspergillosis (s/p Itraconazole in 2015), diffuse cystic bronchiectasis with Stenotrophomonas colonization, RUL cavitary lesion, HTN, and DM2 presents with worsening cough productive of thick, yellow, nonbloody sputum for the past 1 month. Pt's son at bedside, who served as , states that pt has been having a frequent productive cough since early this year, but the cough has become noticeably more intense over the past 1 month. Pt has been having severe, forceful coughing fits that cause pt to become short of breath and is associated with musculoskeletal pain in her chest and abdomen. Pt has also been having SOB with minimal exertion, with pt becoming winded after walking 10-15 steps. Over the last 1 week, pt has been using her albuterol inhaler 6-7 times a day. Pt's son also notes that pt has been having low-grade fevers (100-101F) every evening since 15 days ago. No recent travel or sick contacts. Denies any typical CP, palpitations, N/V, or diarrhea. Has had weight loss ~5 lbs over a 3-month period with increasing generalized weakness.     Pt follows with Dr. Young and was last seen on 7/3/18 for worsening cough. At that visit, pt was started on Prednisone 10 mg daily that was gradually tapered over 2 weeks. Pt reports that her cough failed to improve while Prednisone.     In the ED, T 98.3, HR 83, /78, RR 16, and O2 sat 100% RA. Labs with no leukocytosis, RVP negative. CXR and CT A/P with no acute findings. Pt got IV levaquin 750 mg x1 and IV solumedrol 125 mg x1. 62 yo woman with history of allergic bronchopulmonary aspergillosis (s/p Itraconazole in 2015), diffuse cystic bronchiectasis with Stenotrophomonas colonization, RUL cavitary lesion, HTN, and DM2 presents with worsening cough productive of thick, yellow, nonbloody sputum for the past 1 month. Pt's son at bedside, who served as , states that pt has been having a frequent productive cough since early this year, but the cough has become noticeably more intense over the past 1 month. Pt has been having severe, forceful coughing fits that cause pt to become short of breath and is associated with musculoskeletal pain in her chest and abdomen. Pt has also been having SOB with minimal exertion, with pt becoming winded after walking 10-15 steps. Over the last 1 week, pt has been using her albuterol inhaler 6-7 times a day. Pt's son also notes that pt has been having low-grade fevers (100-101F) every evening since 15 days ago. No recent travel or sick contacts. Denies any typical CP, palpitations, N/V, or diarrhea. Has had weight loss ~5 lbs over a 3-month period with increasing generalized weakness.     Pt follows with Dr. Young and was last seen on 7/3/18 for worsening cough. At that visit, pt was started on Prednisone 10 mg daily that was gradually tapered over 2 weeks. Pt reports that her cough failed to improve while on Prednisone.     In the ED, T 98.3, HR 83, /78, RR 16, and O2 sat 100% RA. Labs with no leukocytosis, RVP negative. CXR and CT A/P with no acute findings. Pt got IV levaquin 750 mg x1 and IV solumedrol 125 mg x1. 64 yo woman with history of allergic bronchopulmonary aspergillosis (s/p Itraconazole in 2015), diffuse cystic bronchiectasis with Stenotrophomonas colonization, RUL cavitary lesion, HTN, and DM2 presents with worsening cough productive of thick, yellow, nonbloody sputum for the past 1 month. Pt's son at bedside, who served as , states that pt has been having a frequent productive cough since early this year, but the cough has become noticeably more severe over the past 1 month. Pt has been having frequent forceful coughing fits that cause pt to become short of breath and is associated with musculoskeletal pain in her chest and abdomen. Pt has also been having SOB with minimal exertion, with pt becoming winded after walking 10-15 steps. Over the last 1 week, pt has been using her albuterol inhaler 6-7 times a day. Pt's son also notes that pt has been having low-grade fevers (100-101F) every evening since 15 days ago. No recent travel or sick contacts. Denies any typical CP, palpitations, N/V, or diarrhea. Has had weight loss ~5 lbs over a 3-month period with increasing generalized weakness. Had 2-3 days of diarrhea about 10 days that has resolved, now with constipation.    Pt follows with Dr. Young and was last seen on 7/3/18 for worsening cough. At that visit, pt was started on Prednisone 10 mg daily that was gradually tapered over 2 weeks. Pt reports that her cough failed to improve while on the Prednisone taper.     In the ED, T 98.3, HR 83, /78, RR 16, and O2 sat 100% RA. Labs with no leukocytosis, RVP negative. CXR and CT A/P with no acute findings. Pt got IV levaquin 750 mg x1 and IV solumedrol 125 mg x1. 62 yo woman with history of allergic bronchopulmonary aspergillosis (s/p Itraconazole in 2015), diffuse cystic bronchiectasis with Stenotrophomonas colonization, RUL thin-walled cavitary lesion, HTN, and DM2 presents with worsening cough productive of thick, yellow, nonbloody sputum for the past 1 month. Pt's son at bedside, who served as , states that pt has been having a frequent productive cough since early this year, but the cough has become noticeably more severe over the past 1 month. Pt has been having frequent forceful coughing fits that cause pt to become short of breath and is associated with musculoskeletal pain in her chest and abdomen. Pt has also been having SOB with minimal exertion, with pt becoming winded after walking 10-15 steps. Over the last 1 week, pt has been using her albuterol inhaler 6-7 times a day. Pt's son also notes that pt has been having low-grade fevers (100-101F) every evening since 15 days ago. No recent travel or sick contacts. Denies any typical CP, palpitations, N/V, or diarrhea. Has had weight loss ~5 lbs over a 3-month period with increasing generalized weakness. Had 2-3 days of diarrhea about 10 days that has resolved, now with constipation.    Pt follows with Dr. Young and was last seen on 7/3/18 for worsening cough. At that visit, pt was started on Prednisone 10 mg daily that was gradually tapered over 2 weeks. Pt reports that her cough failed to improve while on the Prednisone taper.     In the ED, T 98.3, HR 83, /78, RR 16, and O2 sat 100% RA. Labs with no leukocytosis, RVP negative. CXR and CT A/P with no acute findings. Pt got IV levaquin 750 mg x1 and IV solumedrol 125 mg x1.

## 2018-07-30 NOTE — H&P ADULT - PMH
ABPA (allergic bronchopulmonary aspergillosis)    Asthma    Bronchiectasis    DM (diabetes mellitus) ABPA (allergic bronchopulmonary aspergillosis)    Bronchiectasis    DM (diabetes mellitus)    Hypertension

## 2018-07-30 NOTE — ED ADULT NURSE NOTE - CHIEF COMPLAINT QUOTE
pt c/o productive cough and sob x 5 days. reports subjective fevers. also reports generalized abd pain x 5 days. last BM this morning. denies n/v/d.  hx-DM fs 177

## 2018-07-30 NOTE — ED PROVIDER NOTE - OBJECTIVE STATEMENT
63 year old  asthmatic, htn, DM, c/o SOB cough with yellow sputum, fevers, chills night sweats, also c/o abd pain worse on coughing, had diarrhea 4 days a go but nml BM today. no n/v. DEnies hx of TB, never been intubated, last on steroids  1 month ago. Also describes 4-5 month weight loss. Some CP on coughing.   meds metformin, protoonix, atorvastin, montelukast.

## 2018-07-30 NOTE — ED PROVIDER NOTE - MEDICAL DECISION MAKING DETAILS
acute exacerbationasthma, possiblr PNA- labs cxr steroids duonebs reassess  abd pain- epigastric - pepcid concern for occult malignancy

## 2018-07-30 NOTE — H&P ADULT - PROBLEM SELECTOR PLAN 5
Chronic. Can be 2/2 yeast infection given frequent steroid use.  - Will do nystatin powder  - Check UA Acute on chronic. No S/S of active bleed. Likely multifactorial, including anemia of chronic disease and possibly iron-deficiency anemia, as MCV indicates microcytic anemia.  - Monitor H/H and transfuse for goal Hgb > 7

## 2018-07-30 NOTE — H&P ADULT - ASSESSMENT
64 yo woman with history of allergic bronchopulmonary aspergillosis (s/p Itraconazole in 2015), diffuse cystic bronchiectasis with Stenotrophomonas colonization, RUL cavitary lesion, HTN, and DM2 presents with worsening cough productive of thick, yellow, nonbloody sputum for the past 1 month, admitted for possible bronchiectasis exacerbation. 62 yo woman with history of allergic bronchopulmonary aspergillosis (s/p Itraconazole in 2015), diffuse cystic bronchiectasis with Stenotrophomonas colonization, RUL cavitary lesion, HTN, and DM2 presents with worsening cough productive of thick, yellow, nonbloody sputum for the past 1 month, admitted for possible bronchiectasis exacerbation vs. progression and r/o active TB vs. [place]. 62 yo woman with history of allergic bronchopulmonary aspergillosis (s/p Itraconazole in 2015), diffuse cystic bronchiectasis with Stenotrophomonas colonization, RUL thin-walled cavitary lesion, HTN, and DM2 presents with worsening cough productive of thick, yellow, nonbloody sputum for the past 1 month, admitted for possible bronchiectasis exacerbation vs. progression and r/o active TB vs. [place].

## 2018-07-30 NOTE — PATIENT PROFILE ADULT. - LANGUAGE ASSISTANCE NEEDED
if family at bedside patient prefers family to translate./Yes-Patient/Caregiver accepts free interpretation services...

## 2018-07-30 NOTE — H&P ADULT - NSHPPHYSICALEXAM_GEN_ALL_CORE
Vital Signs Last 24 Hrs  T(C): 36.4 (30 Jul 2018 18:25), Max: 36.8 (30 Jul 2018 10:28)  T(F): 97.5 (30 Jul 2018 18:25), Max: 98.3 (30 Jul 2018 10:28)  HR: 98 (30 Jul 2018 18:25) (83 - 98)  BP: 129/80 (30 Jul 2018 18:25) (124/78 - 153/84)  BP(mean): --  RR: 17 (30 Jul 2018 18:25) (16 - 17)  SpO2: 96% (30 Jul 2018 18:25) (94% - 100%)    PHYSICAL EXAM:  General: No acute distress.  HEENT: Normocephalic, atraumatic.  PERRL.  EOMI.  No scleral icterus.  Moist MM.  No oropharyngeal exudates.    Neck: Supple.  Full range of motion.  No JVD.  No carotid bruits.  No thyromegaly.  Trachea midline.  No lymphadenopathy.   Heart: RRR.  Normal S1 and S2.  No murmurs, rubs, or gallops.   Lungs: CTAB. No wheezes, crackles, or rhonchi.    Abdomen: BS+, soft, NT/ND.  No organomegaly.  Skin: Warm and dry.  No rashes.  Extremities: No edema, clubbing, or cyanosis.  2+ peripheral pulses b/l.  Musculoskeletal: No deformities.  No spinal or paraspinal tenderness.  Neuro: A&Ox3.  CN II-XII intact.  5/5 strength in UE and LE b/l.  Tactile sensation intact in UE and LE b/l.  Cerebellar function intact as assessed by finger-to-nose test. Vital Signs Last 24 Hrs  T(C): 36.4 (30 Jul 2018 18:25), Max: 36.8 (30 Jul 2018 10:28)  T(F): 97.5 (30 Jul 2018 18:25), Max: 98.3 (30 Jul 2018 10:28)  HR: 98 (30 Jul 2018 18:25) (83 - 98)  BP: 129/80 (30 Jul 2018 18:25) (124/78 - 153/84)  BP(mean): --  RR: 17 (30 Jul 2018 18:25) (16 - 17)  SpO2: 96% (30 Jul 2018 18:25) (94% - 100%)    PHYSICAL EXAM:  General: In mild respiratory distress. Thin-appearing.   HEENT: PERRL.  EOMI.  No scleral icterus.  Moist MM.  No oropharyngeal exudates.    Neck: Supple.  Full range of motion.  No JVD.  No thyromegaly.  Trachea midline.  No lymphadenopathy.   Heart: RRR.  Normal S1 and S2.    Lungs: Scattered inspiratory wheezing, scattered coarse breath sounds that improve with cough.  Decreased expiratory phase.   Abdomen: BS+, soft, NT/ND.    Skin: Warm and dry.  No rashes.  Extremities: No LE edema b/l.  2+ peripheral pulses b/l.  Musculoskeletal: No deformities.  No spinal or paraspinal tenderness.  Neuro: A&Ox3.  Muscle strength and tactile sensation intact.

## 2018-07-30 NOTE — H&P ADULT - PROBLEM SELECTOR PLAN 1
Likely 2/2 acute exacerbation vs. progression of bronchiectasis vs. less likely ABPA flare or active TB. RVP negative. CXR with no evidence of focal consolidation/opacity or new cavitary lesions and no leukocytosis on labs.   - Obtain CT chest noncontrast  - Check sputum culture  - R/o active TB with AFB sputum x3  - Check IgE levels and CBC with diff for peripheral eosinophilia  - C/w IV solumedrol 40 mg daily  - C/w duonebs q6hrs, Symbicort, and Singulair  - Chest PT four times a day  - Aerobika four times a day  - C/w supplemental O2 PRN to keep O2 sat > 90% Likely 2/2 acute exacerbation vs. progression of bronchiectasis vs. [place]. Less likely causes include active TB or ABPA flare.   - RVP negative. CXR with no evidence of focal consolidations/infiltrates or new cavitary lesions and no leukocytosis on labs.   - Obtain CT chest noncontrast  - Check sputum culture  - Will hold antibiotics for now given that pt is afebrile with no leukocytosis  - R/o active TB vs. [place] with AFB sputum x3 given current symptoms and thin-walled RUL cavitary lesion   - Check IgE levels and CBC with diff for peripheral eosinophilia  - C/w IV solumedrol 40 mg daily  - C/w duonebs q6hrs, Symbicort, and Singulair  - Chest PT four times a day  - Aerobika four times a day  - C/w supplemental O2 PRN to keep O2 sat > 90% Likely 2/2 acute exacerbation vs. progression of bronchiectasis vs. [place]. Less likely causes include active TB or ABPA flare.   - RVP negative. CXR with no evidence of focal consolidations/infiltrates or new cavitary lesions and no leukocytosis on labs.   - Obtain CT chest noncontrast  - Check sputum culture  - F/u blood cultures (7/30)  - Will hold antibiotics for now given that pt is afebrile with no leukocytosis  - R/o active TB vs. [place] with AFB sputum x3 given current symptoms and thin-walled RUL cavitary lesion   - Check IgE levels and CBC with diff for peripheral eosinophilia  - C/w IV solumedrol 40 mg daily  - C/w duonebs q6hrs, Symbicort, and Singulair  - Chest PT four times a day  - Aerobika four times a day  - C/w supplemental O2 PRN to keep O2 sat > 90% Likely 2/2 acute exacerbation vs. progression of bronchiectasis vs. [place]. Less likely causes include active TB or ABPA flare.   - RVP negative. CXR with no evidence of focal consolidations/infiltrates or new cavitary lesions and no leukocytosis on labs.   - Obtain CT chest noncontrast  - Check sputum culture  - F/u blood cultures (7/30)  - Will hold antibiotics for now given that pt is afebrile here with no leukocytosis  - R/o active TB vs. [place] with AFB sputum x3 given current symptoms and thin-walled RUL cavitary lesion   - Check IgE levels and CBC with diff for peripheral eosinophilia  - C/w IV solumedrol 40 mg daily  - C/w duonebs q6hrs, Symbicort, and Singulair  - Chest PT four times a day  - Aerobika four times a day  - C/w supplemental O2 PRN to keep O2 sat > 90%

## 2018-07-30 NOTE — ED ADULT NURSE NOTE - OBJECTIVE STATEMENT
A&Ox3, respirations even and unlabored, skin warm and dry good for color, resting in stretcher, lauren speaking, son at bedside providing translation. As per son patient complains of productive cough with yellow phlegm, SOB, worsening on exertion, abdominal pain worsening with cough, and weakness. Son states patient ambulatory at baseline however, patient has been increasingly weak for the past month.

## 2018-07-30 NOTE — PATIENT PROFILE ADULT. - NS TRANSFER PATIENT BELONGINGS
Jewelry/Money (specify)/Clothing/Electronic Device (specify)/orange ring, scarf, pants, purse, cell phone./Other belongings

## 2018-07-30 NOTE — H&P ADULT - PROBLEM SELECTOR PLAN 2
Can be with acute exacerbation vs. progression of disease.  - Plan as above for productive cough Can be in setting of acute exacerbation vs. progression of disease.  - Plan as above for productive cough

## 2018-07-30 NOTE — H&P ADULT - NSHPSOCIALHISTORY_GEN_ALL_CORE
Tobacco: denies  EtOH: denies  Illicit drugs: denies  Lives with Tobacco: denies  EtOH: denies  Illicit drugs: denies

## 2018-07-30 NOTE — H&P ADULT - PROBLEM SELECTOR PLAN 4
HgbA1c 10.8% in 9/2017. Likely exacerbated by frequent steroid use.  - C/w long-acting insulin  - Start ISS with humalog and POCT glucose qAC and qhs HgbA1c 10.8% in 9/2017. Likely exacerbated by frequent steroid use. Only on oral hypoglycemics with Metformin at home.   - Start ISS with humalog and POCT glucose qAC and qhs  - Add long-acting insulin if POCT glucose becomes poorly controlled in setting of steroid use

## 2018-07-30 NOTE — H&P ADULT - PROBLEM SELECTOR PLAN 3
BPs in acceptable range.   - Will hold home amlodipine 10 mg daily and restart if BPs become elevated

## 2018-07-31 DIAGNOSIS — D64.9 ANEMIA, UNSPECIFIED: ICD-10-CM

## 2018-07-31 PROBLEM — J40 BRONCHITIS, NOT SPECIFIED AS ACUTE OR CHRONIC: Chronic | Status: INACTIVE | Noted: 2017-09-19 | Resolved: 2018-07-30

## 2018-07-31 LAB
ALBUMIN SERPL ELPH-MCNC: 3.6 G/DL — SIGNIFICANT CHANGE UP (ref 3.3–5)
ALP SERPL-CCNC: 153 U/L — HIGH (ref 40–120)
ALT FLD-CCNC: 21 U/L — SIGNIFICANT CHANGE UP (ref 4–33)
APPEARANCE UR: CLEAR — SIGNIFICANT CHANGE UP
AST SERPL-CCNC: 23 U/L — SIGNIFICANT CHANGE UP (ref 4–32)
BASOPHILS # BLD AUTO: 0.02 K/UL — SIGNIFICANT CHANGE UP (ref 0–0.2)
BASOPHILS NFR BLD AUTO: 0.3 % — SIGNIFICANT CHANGE UP (ref 0–2)
BILIRUB SERPL-MCNC: < 0.2 MG/DL — LOW (ref 0.2–1.2)
BILIRUB UR-MCNC: NEGATIVE — SIGNIFICANT CHANGE UP
BLD GP AB SCN SERPL QL: NEGATIVE — SIGNIFICANT CHANGE UP
BLOOD UR QL VISUAL: NEGATIVE — SIGNIFICANT CHANGE UP
BUN SERPL-MCNC: 18 MG/DL — SIGNIFICANT CHANGE UP (ref 7–23)
CALCIUM SERPL-MCNC: 9.2 MG/DL — SIGNIFICANT CHANGE UP (ref 8.4–10.5)
CHLORIDE SERPL-SCNC: 102 MMOL/L — SIGNIFICANT CHANGE UP (ref 98–107)
CO2 SERPL-SCNC: 25 MMOL/L — SIGNIFICANT CHANGE UP (ref 22–31)
COLOR SPEC: SIGNIFICANT CHANGE UP
CREAT SERPL-MCNC: 0.49 MG/DL — LOW (ref 0.5–1.3)
CULTURE - ACID FAST SMEAR CONCENTRATED: SIGNIFICANT CHANGE UP
EOSINOPHIL # BLD AUTO: 0 K/UL — SIGNIFICANT CHANGE UP (ref 0–0.5)
EOSINOPHIL NFR BLD AUTO: 0 % — SIGNIFICANT CHANGE UP (ref 0–6)
GLUCOSE BLDC GLUCOMTR-MCNC: 227 MG/DL — HIGH (ref 70–99)
GLUCOSE BLDC GLUCOMTR-MCNC: 260 MG/DL — HIGH (ref 70–99)
GLUCOSE BLDC GLUCOMTR-MCNC: 265 MG/DL — HIGH (ref 70–99)
GLUCOSE BLDC GLUCOMTR-MCNC: 411 MG/DL — HIGH (ref 70–99)
GLUCOSE SERPL-MCNC: 348 MG/DL — HIGH (ref 70–99)
GLUCOSE UR-MCNC: >1000 — SIGNIFICANT CHANGE UP
GRAM STN SPT: SIGNIFICANT CHANGE UP
HBA1C BLD-MCNC: 9.1 % — HIGH (ref 4–5.6)
HCT VFR BLD CALC: 31.6 % — LOW (ref 34.5–45)
HGB BLD-MCNC: 9.8 G/DL — LOW (ref 11.5–15.5)
IGE SERPL-ACNC: 7 IU/ML — SIGNIFICANT CHANGE UP (ref 0–100)
IMM GRANULOCYTES # BLD AUTO: 0.06 # — SIGNIFICANT CHANGE UP
IMM GRANULOCYTES NFR BLD AUTO: 0.8 % — SIGNIFICANT CHANGE UP (ref 0–1.5)
KETONES UR-MCNC: NEGATIVE — SIGNIFICANT CHANGE UP
LEUKOCYTE ESTERASE UR-ACNC: SIGNIFICANT CHANGE UP
LYMPHOCYTES # BLD AUTO: 1.25 K/UL — SIGNIFICANT CHANGE UP (ref 1–3.3)
LYMPHOCYTES # BLD AUTO: 15.8 % — SIGNIFICANT CHANGE UP (ref 13–44)
MAGNESIUM SERPL-MCNC: 1.9 MG/DL — SIGNIFICANT CHANGE UP (ref 1.6–2.6)
MCHC RBC-ENTMCNC: 23.2 PG — LOW (ref 27–34)
MCHC RBC-ENTMCNC: 31 % — LOW (ref 32–36)
MCV RBC AUTO: 74.7 FL — LOW (ref 80–100)
MONOCYTES # BLD AUTO: 0.56 K/UL — SIGNIFICANT CHANGE UP (ref 0–0.9)
MONOCYTES NFR BLD AUTO: 7.1 % — SIGNIFICANT CHANGE UP (ref 2–14)
NEUTROPHILS # BLD AUTO: 6.01 K/UL — SIGNIFICANT CHANGE UP (ref 1.8–7.4)
NEUTROPHILS NFR BLD AUTO: 76 % — SIGNIFICANT CHANGE UP (ref 43–77)
NITRITE UR-MCNC: NEGATIVE — SIGNIFICANT CHANGE UP
NRBC # FLD: 0 — SIGNIFICANT CHANGE UP
PH UR: 6.5 — SIGNIFICANT CHANGE UP (ref 4.6–8)
PHOSPHATE SERPL-MCNC: 3.4 MG/DL — SIGNIFICANT CHANGE UP (ref 2.5–4.5)
PLATELET # BLD AUTO: 168 K/UL — SIGNIFICANT CHANGE UP (ref 150–400)
PMV BLD: 10.7 FL — SIGNIFICANT CHANGE UP (ref 7–13)
POTASSIUM SERPL-MCNC: 4 MMOL/L — SIGNIFICANT CHANGE UP (ref 3.5–5.3)
POTASSIUM SERPL-SCNC: 4 MMOL/L — SIGNIFICANT CHANGE UP (ref 3.5–5.3)
PROT SERPL-MCNC: 6.8 G/DL — SIGNIFICANT CHANGE UP (ref 6–8.3)
PROT UR-MCNC: NEGATIVE MG/DL — SIGNIFICANT CHANGE UP
RBC # BLD: 4.23 M/UL — SIGNIFICANT CHANGE UP (ref 3.8–5.2)
RBC # FLD: 17.4 % — HIGH (ref 10.3–14.5)
RH IG SCN BLD-IMP: POSITIVE — SIGNIFICANT CHANGE UP
SODIUM SERPL-SCNC: 138 MMOL/L — SIGNIFICANT CHANGE UP (ref 135–145)
SP GR SPEC: 1.02 — SIGNIFICANT CHANGE UP (ref 1–1.04)
SPECIMEN SOURCE: SIGNIFICANT CHANGE UP
SQUAMOUS # UR AUTO: SIGNIFICANT CHANGE UP
UROBILINOGEN FLD QL: NORMAL MG/DL — SIGNIFICANT CHANGE UP
WBC # BLD: 7.9 K/UL — SIGNIFICANT CHANGE UP (ref 3.8–10.5)
WBC # FLD AUTO: 7.9 K/UL — SIGNIFICANT CHANGE UP (ref 3.8–10.5)
WBC UR QL: SIGNIFICANT CHANGE UP (ref 0–?)

## 2018-07-31 PROCEDURE — 71250 CT THORAX DX C-: CPT | Mod: 26

## 2018-07-31 RX ORDER — MONTELUKAST 4 MG/1
10 TABLET, CHEWABLE ORAL AT BEDTIME
Qty: 0 | Refills: 0 | Status: DISCONTINUED | OUTPATIENT
Start: 2018-07-31 | End: 2018-08-07

## 2018-07-31 RX ORDER — PANTOPRAZOLE SODIUM 20 MG/1
40 TABLET, DELAYED RELEASE ORAL
Qty: 0 | Refills: 0 | Status: DISCONTINUED | OUTPATIENT
Start: 2018-07-31 | End: 2018-08-07

## 2018-07-31 RX ORDER — INSULIN GLARGINE 100 [IU]/ML
18 INJECTION, SOLUTION SUBCUTANEOUS AT BEDTIME
Qty: 0 | Refills: 0 | Status: DISCONTINUED | OUTPATIENT
Start: 2018-07-31 | End: 2018-08-02

## 2018-07-31 RX ORDER — BUDESONIDE AND FORMOTEROL FUMARATE DIHYDRATE 160; 4.5 UG/1; UG/1
2 AEROSOL RESPIRATORY (INHALATION)
Qty: 0 | Refills: 0 | Status: DISCONTINUED | OUTPATIENT
Start: 2018-07-31 | End: 2018-08-07

## 2018-07-31 RX ORDER — INSULIN LISPRO 100/ML
8 VIAL (ML) SUBCUTANEOUS ONCE
Qty: 0 | Refills: 0 | Status: COMPLETED | OUTPATIENT
Start: 2018-07-31 | End: 2018-07-31

## 2018-07-31 RX ORDER — ASPIRIN/CALCIUM CARB/MAGNESIUM 324 MG
81 TABLET ORAL DAILY
Qty: 0 | Refills: 0 | Status: DISCONTINUED | OUTPATIENT
Start: 2018-07-31 | End: 2018-08-07

## 2018-07-31 RX ORDER — INSULIN GLARGINE 100 [IU]/ML
15 INJECTION, SOLUTION SUBCUTANEOUS AT BEDTIME
Qty: 0 | Refills: 0 | Status: DISCONTINUED | OUTPATIENT
Start: 2018-07-31 | End: 2018-07-31

## 2018-07-31 RX ADMIN — ENOXAPARIN SODIUM 40 MILLIGRAM(S): 100 INJECTION SUBCUTANEOUS at 09:39

## 2018-07-31 RX ADMIN — Medication 81 MILLIGRAM(S): at 12:09

## 2018-07-31 RX ADMIN — Medication 3: at 12:09

## 2018-07-31 RX ADMIN — Medication 1 APPLICATORFUL: at 09:39

## 2018-07-31 RX ADMIN — Medication 3 MILLILITER(S): at 11:24

## 2018-07-31 RX ADMIN — Medication 1 TABLET(S): at 12:09

## 2018-07-31 RX ADMIN — INSULIN GLARGINE 18 UNIT(S): 100 INJECTION, SOLUTION SUBCUTANEOUS at 22:30

## 2018-07-31 RX ADMIN — Medication 40 MILLIGRAM(S): at 06:53

## 2018-07-31 RX ADMIN — Medication 1: at 22:30

## 2018-07-31 RX ADMIN — Medication 8 UNIT(S): at 18:00

## 2018-07-31 RX ADMIN — Medication 3 MILLILITER(S): at 03:51

## 2018-07-31 RX ADMIN — Medication 1 TABLET(S): at 21:45

## 2018-07-31 RX ADMIN — MONTELUKAST 10 MILLIGRAM(S): 4 TABLET, CHEWABLE ORAL at 21:45

## 2018-07-31 RX ADMIN — Medication 2: at 09:38

## 2018-07-31 RX ADMIN — Medication 1 APPLICATORFUL: at 17:46

## 2018-07-31 RX ADMIN — Medication 3 MILLILITER(S): at 15:23

## 2018-07-31 RX ADMIN — PANTOPRAZOLE SODIUM 40 MILLIGRAM(S): 20 TABLET, DELAYED RELEASE ORAL at 06:53

## 2018-07-31 NOTE — PROGRESS NOTE ADULT - ATTENDING COMMENTS
Chronic bronchiectasis with likely exacerbation  Known chronic Stenotrophomonas and MSSA infection  Pulse steroids and start Bactrim.  Symptomatic therapy for cough.  No evidence whatsoever of TB. Can do serial AFB for BRENTON, first is negative.

## 2018-07-31 NOTE — DIETITIAN INITIAL EVALUATION ADULT. - PROBLEM SELECTOR PLAN 1
Likely 2/2 acute exacerbation vs. progression of bronchiectasis vs. [place]. Less likely causes include active TB or ABPA flare.   - RVP negative. CXR with no evidence of focal consolidations/infiltrates or new cavitary lesions and no leukocytosis on labs.   - Obtain CT chest noncontrast  - Check sputum culture  - F/u blood cultures (7/30)  - Will hold antibiotics for now given that pt is afebrile here with no leukocytosis  - R/o active TB vs. [place] with AFB sputum x3 given current symptoms and thin-walled RUL cavitary lesion   - Check IgE levels and CBC with diff for peripheral eosinophilia  - C/w IV solumedrol 40 mg daily  - C/w duonebs q6hrs, Symbicort, and Singulair  - Chest PT four times a day  - Aerobika four times a day  - C/w supplemental O2 PRN to keep O2 sat > 90%

## 2018-07-31 NOTE — DIETITIAN INITIAL EVALUATION ADULT. - OTHER INFO
Nutrition consult received on 7/30/18 for assessment & education, met w/ pt. , no family @ bedside , pt. reports fair appetite, PO intake, verbalized 24 hr recall, appears  acceptable for consistent carbohydrate , but unable to elaborate , declined to use  phone . Noted pt. was in hospital  last September , wt. stable , left written therapeutic diet information in native language @ bedside .

## 2018-07-31 NOTE — PHYSICAL THERAPY INITIAL EVALUATION ADULT - PATIENT PROFILE REVIEW, REHAB EVAL
ACTIVITY: OOB with Assistance; spoke with JOSUE Pena prior to PT evaluation--> Pt OK For PT consult/OOB activity/yes

## 2018-07-31 NOTE — PROGRESS NOTE ADULT - PROBLEM SELECTOR PLAN 5
Acute on chronic. No S/S of active bleed. Likely multifactorial, including anemia of chronic disease and possibly iron-deficiency anemia, as MCV indicates microcytic anemia.  - Monitor H/H and transfuse for goal Hgb > 7 Acute on chronic. No S/S of active bleed. Likely multifactorial, including anemia of chronic disease and possibly iron-deficiency anemia, as MCV indicates microcytic anemia.

## 2018-07-31 NOTE — PROGRESS NOTE ADULT - SUBJECTIVE AND OBJECTIVE BOX
CHIEF COMPLAINT:    Interval Events:    REVIEW OF SYSTEMS:  Constitutional:   Eyes:  ENT:  CV:  Resp:  GI:  :  MSK:  Integumentary:  Neurological:  Psychiatric:  Endocrine:  Hematologic/Lymphatic:  Allergic/Immunologic:  [ ] All other systems negative  [ ] Unable to assess ROS because ________    OBJECTIVE:  ICU Vital Signs Last 24 Hrs  T(C): 37 (31 Jul 2018 08:59), Max: 37 (31 Jul 2018 08:59)  T(F): 98.6 (31 Jul 2018 08:59), Max: 98.6 (31 Jul 2018 08:59)  HR: 80 (31 Jul 2018 05:15) (80 - 98)  BP: 125/67 (31 Jul 2018 05:15) (119/71 - 153/84)  BP(mean): 80 (31 Jul 2018 05:15) (80 - 80)  ABP: --  ABP(mean): --  RR: 18 (31 Jul 2018 05:15) (16 - 20)  SpO2: 96% (31 Jul 2018 05:15) (94% - 100%)        07-30 @ 07:01  -  07-31 @ 07:00  --------------------------------------------------------  IN: 240 mL / OUT: 0 mL / NET: 240 mL      CAPILLARY BLOOD GLUCOSE      POCT Blood Glucose.: 227 mg/dL (31 Jul 2018 09:00)        HOSPITAL MEDICATIONS:  MEDICATIONS  (STANDING):  ALBUTerol/ipratropium for Nebulization 3 milliLiter(s) Nebulizer every 6 hours  aspirin  chewable 81 milliGRAM(s) Oral daily  buDESOnide  80 MICROgram(s)/formoterol 4.5 MICROgram(s) Inhaler 2 Puff(s) Inhalation two times a day  clotrimazole 2% Vaginal Cream 1 Applicatorful Vaginal two times a day  dextrose 5%. 1000 milliLiter(s) (50 mL/Hr) IV Continuous <Continuous>  dextrose 50% Injectable 12.5 Gram(s) IV Push once  dextrose 50% Injectable 25 Gram(s) IV Push once  dextrose 50% Injectable 25 Gram(s) IV Push once  enoxaparin Injectable 40 milliGRAM(s) SubCutaneous every 24 hours  insulin glargine Injectable (LANTUS) 15 Unit(s) SubCutaneous at bedtime  insulin lispro (HumaLOG) corrective regimen sliding scale   SubCutaneous three times a day before meals  insulin lispro (HumaLOG) corrective regimen sliding scale   SubCutaneous at bedtime  methylPREDNISolone sodium succinate Injectable 40 milliGRAM(s) IV Push daily  montelukast 10 milliGRAM(s) Oral at bedtime  multivitamin 1 Tablet(s) Oral daily  pantoprazole    Tablet 40 milliGRAM(s) Oral before breakfast    MEDICATIONS  (PRN):  dextrose 40% Gel 15 Gram(s) Oral once PRN Blood Glucose LESS THAN 70 milliGRAM(s)/deciliter  glucagon  Injectable 1 milliGRAM(s) IntraMuscular once PRN Glucose LESS THAN 70 milligrams/deciliter      LABS:                        9.8    7.90  )-----------( 168      ( 31 Jul 2018 05:15 )             31.6     07-31    138  |  102  |  18  ----------------------------<  348<H>  4.0   |  25  |  0.49<L>    Ca    9.2      31 Jul 2018 05:15  Phos  3.4     07-31  Mg     1.9     07-31    TPro  6.8  /  Alb  3.6  /  TBili  < 0.2<L>  /  DBili  x   /  AST  23  /  ALT  21  /  AlkPhos  153<H>  07-31          Venous Blood Gas:  07-30 @ 12:15  7.36/51/36/26/55.7  VBG Lactate: 1.7      MICROBIOLOGY:     RADIOLOGY:  [ ] Reviewed and interpreted by me    PULMONARY FUNCTION TESTS:    EKG: CHIEF COMPLAINT: No events overnight     Interval Events:    REVIEW OF SYSTEMS:  CV: denies chest pain   Resp: productive cough noted x 2-3 weeks   GI: tolerating PO diet 	  : voids freely   MSK: walks with assist   Hematologic/Lymphatic:  Allergic/Immunologic:  [ ] All other systems negative  [ ] Unable to assess ROS because ________    OBJECTIVE:  ICU Vital Signs Last 24 Hrs  T(C): 37 (31 Jul 2018 08:59), Max: 37 (31 Jul 2018 08:59)  T(F): 98.6 (31 Jul 2018 08:59), Max: 98.6 (31 Jul 2018 08:59)  HR: 80 (31 Jul 2018 05:15) (80 - 98)  BP: 125/67 (31 Jul 2018 05:15) (119/71 - 153/84)  BP(mean): 80 (31 Jul 2018 05:15) (80 - 80)  ABP: --  ABP(mean): --  RR: 18 (31 Jul 2018 05:15) (16 - 20)  SpO2: 96% (31 Jul 2018 05:15) (94% - 100%)        07-30 @ 07:01  -  07-31 @ 07:00  --------------------------------------------------------  IN: 240 mL / OUT: 0 mL / NET: 240 mL      CAPILLARY BLOOD GLUCOSE      POCT Blood Glucose.: 227 mg/dL (31 Jul 2018 09:00)        HOSPITAL MEDICATIONS:  MEDICATIONS  (STANDING):  ALBUTerol/ipratropium for Nebulization 3 milliLiter(s) Nebulizer every 6 hours  aspirin  chewable 81 milliGRAM(s) Oral daily  buDESOnide  80 MICROgram(s)/formoterol 4.5 MICROgram(s) Inhaler 2 Puff(s) Inhalation two times a day  clotrimazole 2% Vaginal Cream 1 Applicatorful Vaginal two times a day  dextrose 5%. 1000 milliLiter(s) (50 mL/Hr) IV Continuous <Continuous>  dextrose 50% Injectable 12.5 Gram(s) IV Push once  dextrose 50% Injectable 25 Gram(s) IV Push once  dextrose 50% Injectable 25 Gram(s) IV Push once  enoxaparin Injectable 40 milliGRAM(s) SubCutaneous every 24 hours  insulin glargine Injectable (LANTUS) 15 Unit(s) SubCutaneous at bedtime  insulin lispro (HumaLOG) corrective regimen sliding scale   SubCutaneous three times a day before meals  insulin lispro (HumaLOG) corrective regimen sliding scale   SubCutaneous at bedtime  methylPREDNISolone sodium succinate Injectable 40 milliGRAM(s) IV Push daily  montelukast 10 milliGRAM(s) Oral at bedtime  multivitamin 1 Tablet(s) Oral daily  pantoprazole    Tablet 40 milliGRAM(s) Oral before breakfast    MEDICATIONS  (PRN):  dextrose 40% Gel 15 Gram(s) Oral once PRN Blood Glucose LESS THAN 70 milliGRAM(s)/deciliter  glucagon  Injectable 1 milliGRAM(s) IntraMuscular once PRN Glucose LESS THAN 70 milligrams/deciliter      LABS:                        9.8    7.90  )-----------( 168      ( 31 Jul 2018 05:15 )             31.6     07-31    138  |  102  |  18  ----------------------------<  348<H>  4.0   |  25  |  0.49<L>    Ca    9.2      31 Jul 2018 05:15  Phos  3.4     07-31  Mg     1.9     07-31    TPro  6.8  /  Alb  3.6  /  TBili  < 0.2<L>  /  DBili  x   /  AST  23  /  ALT  21  /  AlkPhos  153<H>  07-31          Venous Blood Gas:  07-30 @ 12:15  7.36/51/36/26/55.7  VBG Lactate: 1.7      MICROBIOLOGY:     RADIOLOGY:  [ ] Reviewed and interpreted by me    PULMONARY FUNCTION TESTS:    EKG:

## 2018-07-31 NOTE — DIETITIAN INITIAL EVALUATION ADULT. - NS AS NUTRI DX BIOCHEMICAL
Hemoglobin A1C 2/2 Steroid therapy/Altered nutrition - related laboratory values (specify)/Food - medication interaction (specify)

## 2018-07-31 NOTE — PROGRESS NOTE ADULT - PROBLEM SELECTOR PLAN 6
Chronic. Can be 2/2 yeast infection given frequent steroid use vs. vaginal atrophy.  - Will do clotrimazole cream  - Check UA Chronic. Can be 2/2 yeast infection given frequent steroid use vs. vaginal atrophy.  - Will do clotrimazole cream  UA neg

## 2018-07-31 NOTE — PROGRESS NOTE ADULT - PROBLEM SELECTOR PLAN 2
Can be in setting of acute exacerbation vs. progression of disease.  - Plan as above for productive cough Can be in setting of acute exacerbation vs. progression of disease.  - Plan as above for productive cough  Will start pt on Bactrim DS

## 2018-07-31 NOTE — DIETITIAN INITIAL EVALUATION ADULT. - PROBLEM SELECTOR PLAN 6
Chronic. Can be 2/2 yeast infection given frequent steroid use vs. vaginal atrophy.  - Will do clotrimazole cream  - Check UA

## 2018-07-31 NOTE — PROGRESS NOTE ADULT - PROBLEM SELECTOR PLAN 1
Likely 2/2 acute exacerbation vs. progression of bronchiectasis vs. [place].  - RVP negative.  CT chest noncontrast-----------------------  - Check sputum culture  - Will hold antibiotics for now given that pt is afebrile here with no leukocytosis  - R/o active TB vs. [place] with AFB sputum x3 given   - C/w IV solumedrol 40 mg daily  - Aerobika four times a day Likely 2/2 acute exacerbation vs. progression of bronchiectasis vs. [place].  - RVP negative.  CT chest noncontrast-----------------------  c/w IV steroids  Pt well known to DR Young in the community - Per pulmonary no need for TB isolation - No need for Sputum AFB

## 2018-07-31 NOTE — PROGRESS NOTE ADULT - ASSESSMENT
64 yo woman with history of allergic bronchopulmonary aspergillosis (s/p Itraconazole in 2015), diffuse cystic bronchiectasis with Stenotrophomonas colonization, RUL thin-walled cavitary lesion, HTN, and DM2 presents with worsening cough productive of thick, yellow, nonbloody sputum for the past 1 month, admitted for possible bronchiectasis exacerbation vs. progression and r/o active TB vs. [place].

## 2018-07-31 NOTE — DIETITIAN INITIAL EVALUATION ADULT. - PROBLEM SELECTOR PLAN 5
Acute on chronic. No S/S of active bleed. Likely multifactorial, including anemia of chronic disease and possibly iron-deficiency anemia, as MCV indicates microcytic anemia.  - Monitor H/H and transfuse for goal Hgb > 7

## 2018-07-31 NOTE — PROGRESS NOTE ADULT - PROBLEM SELECTOR PLAN 4
HgbA1c 10.8% in 9/2017. Likely exacerbated by frequent steroid use.   Only on oral hypoglycemics with Metformin at home.   - Start ISS with humalog and POCT glucose qAC and qhs  - Add long-acting insulin if POCT glucose becomes poorly controlled in setting of steroid use HgbA1c 10.8% in 9/2017. Likely exacerbated by frequent steroid use.   Only on oral hypoglycemics with Metformin at home.   -ON SSI

## 2018-07-31 NOTE — DIETITIAN INITIAL EVALUATION ADULT. - PROBLEM SELECTOR PLAN 2
Can be in setting of acute exacerbation vs. progression of disease.  - Plan as above for productive cough

## 2018-07-31 NOTE — PHYSICAL THERAPY INITIAL EVALUATION ADULT - ADDITIONAL COMMENTS
Pt reports that she lives in an apartment with , children, and grandchildren with ~6-8STE; (+)1 handrail; room on fist floor. Prior to hospital admission pt was completely independent and used no assistive device with ambulation. Pt denies any recent falls.    Pt left comfortable in bed, NAD, all lines intact, all precautions maintained, with call bell in reach,  @bedside, and RN aware of PT evaluation.

## 2018-07-31 NOTE — PHYSICAL THERAPY INITIAL EVALUATION ADULT - PERTINENT HX OF CURRENT PROBLEM, REHAB EVAL
62 yo woman with history of allergic bronchopulmonary aspergillosis (s/p Itraconazole in 2015), diffuse cystic bronchiectasis with Stenotrophomonas colonization, RUL thin-walled cavitary lesion, HTN, and DM2 presents with worsening cough productive of thick, yellow, nonbloody sputum for the past 1 month

## 2018-07-31 NOTE — DIETITIAN INITIAL EVALUATION ADULT. - PROBLEM SELECTOR PLAN 4
HgbA1c 10.8% in 9/2017. Likely exacerbated by frequent steroid use. Only on oral hypoglycemics with Metformin at home.   - Start ISS with humalog and POCT glucose qAC and qhs  - Add long-acting insulin if POCT glucose becomes poorly controlled in setting of steroid use

## 2018-08-01 LAB
APTT BLD: 24.8 SEC — LOW (ref 27.5–37.4)
BUN SERPL-MCNC: 17 MG/DL — SIGNIFICANT CHANGE UP (ref 7–23)
CALCIUM SERPL-MCNC: 8.7 MG/DL — SIGNIFICANT CHANGE UP (ref 8.4–10.5)
CHLORIDE SERPL-SCNC: 103 MMOL/L — SIGNIFICANT CHANGE UP (ref 98–107)
CO2 SERPL-SCNC: 25 MMOL/L — SIGNIFICANT CHANGE UP (ref 22–31)
CREAT SERPL-MCNC: 0.47 MG/DL — LOW (ref 0.5–1.3)
GLUCOSE BLDC GLUCOMTR-MCNC: 147 MG/DL — HIGH (ref 70–99)
GLUCOSE BLDC GLUCOMTR-MCNC: 165 MG/DL — HIGH (ref 70–99)
GLUCOSE BLDC GLUCOMTR-MCNC: 166 MG/DL — HIGH (ref 70–99)
GLUCOSE BLDC GLUCOMTR-MCNC: 303 MG/DL — HIGH (ref 70–99)
GLUCOSE SERPL-MCNC: 129 MG/DL — HIGH (ref 70–99)
HCT VFR BLD CALC: 35 % — SIGNIFICANT CHANGE UP (ref 34.5–45)
HGB BLD-MCNC: 10.6 G/DL — LOW (ref 11.5–15.5)
INR BLD: 1.03 — SIGNIFICANT CHANGE UP (ref 0.88–1.17)
MCHC RBC-ENTMCNC: 22.8 PG — LOW (ref 27–34)
MCHC RBC-ENTMCNC: 30.3 % — LOW (ref 32–36)
MCV RBC AUTO: 75.4 FL — LOW (ref 80–100)
NRBC # FLD: 0 — SIGNIFICANT CHANGE UP
PLATELET # BLD AUTO: 199 K/UL — SIGNIFICANT CHANGE UP (ref 150–400)
PMV BLD: 11.4 FL — SIGNIFICANT CHANGE UP (ref 7–13)
POTASSIUM SERPL-MCNC: 3.6 MMOL/L — SIGNIFICANT CHANGE UP (ref 3.5–5.3)
POTASSIUM SERPL-SCNC: 3.6 MMOL/L — SIGNIFICANT CHANGE UP (ref 3.5–5.3)
PROTHROM AB SERPL-ACNC: 11.4 SEC — SIGNIFICANT CHANGE UP (ref 9.8–13.1)
RBC # BLD: 4.64 M/UL — SIGNIFICANT CHANGE UP (ref 3.8–5.2)
RBC # FLD: 17.6 % — HIGH (ref 10.3–14.5)
SODIUM SERPL-SCNC: 141 MMOL/L — SIGNIFICANT CHANGE UP (ref 135–145)
WBC # BLD: 10.76 K/UL — HIGH (ref 3.8–10.5)
WBC # FLD AUTO: 10.76 K/UL — HIGH (ref 3.8–10.5)

## 2018-08-01 RX ORDER — INSULIN LISPRO 100/ML
8 VIAL (ML) SUBCUTANEOUS ONCE
Qty: 0 | Refills: 0 | Status: COMPLETED | OUTPATIENT
Start: 2018-08-01 | End: 2018-08-01

## 2018-08-01 RX ORDER — CHLORHEXIDINE GLUCONATE 213 G/1000ML
1 SOLUTION TOPICAL
Qty: 0 | Refills: 0 | Status: DISCONTINUED | OUTPATIENT
Start: 2018-08-01 | End: 2018-08-07

## 2018-08-01 RX ADMIN — Medication 1 TABLET(S): at 17:06

## 2018-08-01 RX ADMIN — Medication 8 UNIT(S): at 18:35

## 2018-08-01 RX ADMIN — Medication 40 MILLIGRAM(S): at 06:06

## 2018-08-01 RX ADMIN — ENOXAPARIN SODIUM 40 MILLIGRAM(S): 100 INJECTION SUBCUTANEOUS at 09:04

## 2018-08-01 RX ADMIN — Medication 1 APPLICATORFUL: at 06:07

## 2018-08-01 RX ADMIN — PANTOPRAZOLE SODIUM 40 MILLIGRAM(S): 20 TABLET, DELAYED RELEASE ORAL at 06:06

## 2018-08-01 RX ADMIN — Medication 3 MILLILITER(S): at 10:11

## 2018-08-01 RX ADMIN — Medication 1 TABLET(S): at 11:18

## 2018-08-01 RX ADMIN — BUDESONIDE AND FORMOTEROL FUMARATE DIHYDRATE 2 PUFF(S): 160; 4.5 AEROSOL RESPIRATORY (INHALATION) at 21:32

## 2018-08-01 RX ADMIN — Medication 1 APPLICATORFUL: at 17:07

## 2018-08-01 RX ADMIN — Medication 3 MILLILITER(S): at 16:24

## 2018-08-01 RX ADMIN — MONTELUKAST 10 MILLIGRAM(S): 4 TABLET, CHEWABLE ORAL at 22:17

## 2018-08-01 RX ADMIN — Medication 3 MILLILITER(S): at 21:11

## 2018-08-01 RX ADMIN — INSULIN GLARGINE 18 UNIT(S): 100 INJECTION, SOLUTION SUBCUTANEOUS at 22:17

## 2018-08-01 RX ADMIN — Medication 3 MILLILITER(S): at 04:07

## 2018-08-01 RX ADMIN — CHLORHEXIDINE GLUCONATE 1 APPLICATION(S): 213 SOLUTION TOPICAL at 22:09

## 2018-08-01 RX ADMIN — Medication 81 MILLIGRAM(S): at 11:17

## 2018-08-01 RX ADMIN — BUDESONIDE AND FORMOTEROL FUMARATE DIHYDRATE 2 PUFF(S): 160; 4.5 AEROSOL RESPIRATORY (INHALATION) at 10:06

## 2018-08-01 RX ADMIN — Medication 1: at 13:05

## 2018-08-01 RX ADMIN — Medication 100 MILLIGRAM(S): at 11:20

## 2018-08-01 RX ADMIN — Medication 100 MILLIGRAM(S): at 01:39

## 2018-08-01 RX ADMIN — Medication 1 TABLET(S): at 06:06

## 2018-08-01 NOTE — PROGRESS NOTE ADULT - ASSESSMENT
62 yo woman with history of allergic bronchopulmonary aspergillosis (s/p Itraconazole in 2015), diffuse cystic bronchiectasis with Stenotrophomonas colonization, RUL thin-walled cavitary lesion, HTN, and DM2 presents with worsening cough productive of thick, yellow, nonbloody sputum for the past 1 month, admitted for possible bronchiectasis exacerbation vs. progression and r/o active TB vs. [place].

## 2018-08-01 NOTE — PROGRESS NOTE ADULT - SUBJECTIVE AND OBJECTIVE BOX
CHIEF COMPLAINT:    Interval Events:    REVIEW OF SYSTEMS:  Constitutional:   Eyes:  ENT:  CV:  Resp:  GI:  :  MSK:  Integumentary:  Neurological:  Psychiatric:  Endocrine:  Hematologic/Lymphatic:  Allergic/Immunologic:  [ ] All other systems negative  [ ] Unable to assess ROS because ________    OBJECTIVE:  ICU Vital Signs Last 24 Hrs  T(C): 36.3 (01 Aug 2018 06:04), Max: 36.9 (2018 14:00)  T(F): 97.4 (01 Aug 2018 06:04), Max: 98.5 (2018 14:00)  HR: 77 (01 Aug 2018 06:04) (70 - 92)  BP: 125/76 (01 Aug 2018 06:04) (124/70 - 128/68)  BP(mean): 78 (2018 14:00) (78 - 78)  ABP: --  ABP(mean): --  RR: 19 (01 Aug 2018 06:04) (19 - 20)  SpO2: 100% (01 Aug 2018 06:04) (96% - 100%)        CAPILLARY BLOOD GLUCOSE      POCT Blood Glucose.: 147 mg/dL (01 Aug 2018 09:00)      PHYSICAL EXAM:  General:   HEENT:   Lymph Nodes:  Neck:   Respiratory:   Cardiovascular:   Abdomen:   Extremities:   Skin:   Neurological:  Psychiatry:    HOSPITAL MEDICATIONS:  MEDICATIONS  (STANDING):  ALBUTerol/ipratropium for Nebulization 3 milliLiter(s) Nebulizer every 6 hours  aspirin  chewable 81 milliGRAM(s) Oral daily  buDESOnide  80 MICROgram(s)/formoterol 4.5 MICROgram(s) Inhaler 2 Puff(s) Inhalation two times a day  chlorhexidine 4% Liquid 1 Application(s) Topical <User Schedule>  clotrimazole 2% Vaginal Cream 1 Applicatorful Vaginal two times a day  dextrose 5%. 1000 milliLiter(s) (50 mL/Hr) IV Continuous <Continuous>  dextrose 50% Injectable 12.5 Gram(s) IV Push once  dextrose 50% Injectable 25 Gram(s) IV Push once  dextrose 50% Injectable 25 Gram(s) IV Push once  enoxaparin Injectable 40 milliGRAM(s) SubCutaneous every 24 hours  insulin glargine Injectable (LANTUS) 18 Unit(s) SubCutaneous at bedtime  insulin lispro (HumaLOG) corrective regimen sliding scale   SubCutaneous three times a day before meals  insulin lispro (HumaLOG) corrective regimen sliding scale   SubCutaneous at bedtime  methylPREDNISolone sodium succinate Injectable 40 milliGRAM(s) IV Push daily  montelukast 10 milliGRAM(s) Oral at bedtime  multivitamin 1 Tablet(s) Oral daily  pantoprazole    Tablet 40 milliGRAM(s) Oral before breakfast  trimethoprim  160 mG/sulfamethoxazole 800 mG 1 Tablet(s) Oral two times a day    MEDICATIONS  (PRN):  dextrose 40% Gel 15 Gram(s) Oral once PRN Blood Glucose LESS THAN 70 milliGRAM(s)/deciliter  glucagon  Injectable 1 milliGRAM(s) IntraMuscular once PRN Glucose LESS THAN 70 milligrams/deciliter  guaiFENesin    Syrup 100 milliGRAM(s) Oral every 6 hours PRN Cough      LABS:                        10.6   10.76 )-----------( 199      ( 01 Aug 2018 06:00 )             35.0     08    141  |  103  |  17  ----------------------------<  129<H>  3.6   |  25  |  0.47<L>    Ca    8.7      01 Aug 2018 06:00  Phos  3.4     07-31  Mg     1.9     0731    TPro  6.8  /  Alb  3.6  /  TBili  < 0.2<L>  /  DBili  x   /  AST  23  /  ALT  21  /  AlkPhos  153<H>  07-31    PT/INR - ( 01 Aug 2018 06:00 )   PT: 11.4 SEC;   INR: 1.03          PTT - ( 01 Aug 2018 06:00 )  PTT:24.8 SEC  Urinalysis Basic - ( 2018 09:45 )    Color: PLYEL / Appearance: CLEAR / S.019 / pH: 6.5  Gluc: >1000 / Ketone: NEGATIVE  / Bili: NEGATIVE / Urobili: NORMAL mg/dL   Blood: NEGATIVE / Protein: NEGATIVE mg/dL / Nitrite: NEGATIVE   Leuk Esterase: TRACE / RBC: x / WBC 2-5   Sq Epi: OCC / Non Sq Epi: x / Bacteria: x        Venous Blood Gas:   @ 12:15  7.36/51/36/26/55.7  VBG Lactate: 1.7      MICROBIOLOGY:     RADIOLOGY:  [ ] Reviewed and interpreted by me    PULMONARY FUNCTION TESTS:    EKG: CHIEF COMPLAINT: cough    Interval Events: no events overnight    REVIEW OF SYSTEMS:    CV: S1 S2  Resp: prod cough, insp wheezing, decreased expiratory phase  GI:    [ ] All other systems negative  [x ] Unable to assess ROS because ________    OBJECTIVE:  ICU Vital Signs Last 24 Hrs  T(C): 36.3 (01 Aug 2018 06:04), Max: 36.9 (2018 14:00)  T(F): 97.4 (01 Aug 2018 06:04), Max: 98.5 (2018 14:00)  HR: 77 (01 Aug 2018 06:04) (70 - 92)  BP: 125/76 (01 Aug 2018 06:04) (124/70 - 128/68)  BP(mean): 78 (2018 14:00) (78 - 78)  ABP: --  ABP(mean): --  RR: 19 (01 Aug 2018 06:04) (19 - 20)  SpO2: 100% (01 Aug 2018 06:04) (96% - 100%)        CAPILLARY BLOOD GLUCOSE      POCT Blood Glucose.: 147 mg/dL (01 Aug 2018 09:00)          HOSPITAL MEDICATIONS:  MEDICATIONS  (STANDING):  ALBUTerol/ipratropium for Nebulization 3 milliLiter(s) Nebulizer every 6 hours  aspirin  chewable 81 milliGRAM(s) Oral daily  buDESOnide  80 MICROgram(s)/formoterol 4.5 MICROgram(s) Inhaler 2 Puff(s) Inhalation two times a day  chlorhexidine 4% Liquid 1 Application(s) Topical <User Schedule>  clotrimazole 2% Vaginal Cream 1 Applicatorful Vaginal two times a day  dextrose 5%. 1000 milliLiter(s) (50 mL/Hr) IV Continuous <Continuous>  dextrose 50% Injectable 12.5 Gram(s) IV Push once  dextrose 50% Injectable 25 Gram(s) IV Push once  dextrose 50% Injectable 25 Gram(s) IV Push once  enoxaparin Injectable 40 milliGRAM(s) SubCutaneous every 24 hours  insulin glargine Injectable (LANTUS) 18 Unit(s) SubCutaneous at bedtime  insulin lispro (HumaLOG) corrective regimen sliding scale   SubCutaneous three times a day before meals  insulin lispro (HumaLOG) corrective regimen sliding scale   SubCutaneous at bedtime  methylPREDNISolone sodium succinate Injectable 40 milliGRAM(s) IV Push daily  montelukast 10 milliGRAM(s) Oral at bedtime  multivitamin 1 Tablet(s) Oral daily  pantoprazole    Tablet 40 milliGRAM(s) Oral before breakfast  trimethoprim  160 mG/sulfamethoxazole 800 mG 1 Tablet(s) Oral two times a day    MEDICATIONS  (PRN):  dextrose 40% Gel 15 Gram(s) Oral once PRN Blood Glucose LESS THAN 70 milliGRAM(s)/deciliter  glucagon  Injectable 1 milliGRAM(s) IntraMuscular once PRN Glucose LESS THAN 70 milligrams/deciliter  guaiFENesin    Syrup 100 milliGRAM(s) Oral every 6 hours PRN Cough      LABS:                        10.6   10.76 )-----------( 199      ( 01 Aug 2018 06:00 )             35.0     08    141  |  103  |  17  ----------------------------<  129<H>  3.6   |  25  |  0.47<L>    Ca    8.7      01 Aug 2018 06:00  Phos  3.4       Mg     1.9         TPro  6.8  /  Alb  3.6  /  TBili  < 0.2<L>  /  DBili  x   /  AST  23  /  ALT  21  /  AlkPhos  153<H>      PT/INR - ( 01 Aug 2018 06:00 )   PT: 11.4 SEC;   INR: 1.03          PTT - ( 01 Aug 2018 06:00 )  PTT:24.8 SEC  Urinalysis Basic - ( 2018 09:45 )    Color: PLYEL / Appearance: CLEAR / S.019 / pH: 6.5  Gluc: >1000 / Ketone: NEGATIVE  / Bili: NEGATIVE / Urobili: NORMAL mg/dL   Blood: NEGATIVE / Protein: NEGATIVE mg/dL / Nitrite: NEGATIVE   Leuk Esterase: TRACE / RBC: x / WBC 2-5   Sq Epi: OCC / Non Sq Epi: x / Bacteria: x        Venous Blood Gas:   @ 12:15  7.36/51/36/26/55.7  VBG Lactate: 1.7      MICROBIOLOGY:     RADIOLOGY:  [ ] Reviewed and interpreted by me    PULMONARY FUNCTION TESTS:    EKG:

## 2018-08-01 NOTE — PROGRESS NOTE ADULT - PROBLEM SELECTOR PLAN 6
Chronic. Can be 2/2 yeast infection given frequent steroid use vs. vaginal atrophy.  - Will do clotrimazole cream  UA neg

## 2018-08-01 NOTE — PROGRESS NOTE ADULT - PROBLEM SELECTOR PLAN 1
- Likely 2/2 acute exacerbation vs. progression of bronchiectasis vs. [place].  - RVP negative.  - CT chest noncontrast reviewed, continue current treatment  - c/w IV steroids  - c/w Bactrim for PCP ppx  - oxygen supplement as needed  - monitor resp system closely

## 2018-08-02 ENCOUNTER — APPOINTMENT (OUTPATIENT)
Dept: PULMONOLOGY | Facility: CLINIC | Age: 63
End: 2018-08-02

## 2018-08-02 LAB
-  CEFTAZIDIME: SIGNIFICANT CHANGE UP
-  LEVOFLOXACIN: SIGNIFICANT CHANGE UP
-  TRIMETHOPRIM/SULFAMETHOXAZOLE: SIGNIFICANT CHANGE UP
BACTERIA SPT RESP CULT: SIGNIFICANT CHANGE UP
GLUCOSE BLDC GLUCOMTR-MCNC: 193 MG/DL — HIGH (ref 70–99)
GLUCOSE BLDC GLUCOMTR-MCNC: 204 MG/DL — HIGH (ref 70–99)
GLUCOSE BLDC GLUCOMTR-MCNC: 283 MG/DL — HIGH (ref 70–99)
GLUCOSE BLDC GLUCOMTR-MCNC: 303 MG/DL — HIGH (ref 70–99)
GRAM STN SPT: SIGNIFICANT CHANGE UP
HCT VFR BLD CALC: 33.6 % — LOW (ref 34.5–45)
HGB BLD-MCNC: 10.3 G/DL — LOW (ref 11.5–15.5)
MCHC RBC-ENTMCNC: 22.6 PG — LOW (ref 27–34)
MCHC RBC-ENTMCNC: 30.7 % — LOW (ref 32–36)
MCV RBC AUTO: 73.7 FL — LOW (ref 80–100)
METHOD TYPE: SIGNIFICANT CHANGE UP
NRBC # FLD: 0 — SIGNIFICANT CHANGE UP
ORGANISM # SPEC MICROSCOPIC CNT: SIGNIFICANT CHANGE UP
ORGANISM # SPEC MICROSCOPIC CNT: SIGNIFICANT CHANGE UP
PLATELET # BLD AUTO: 186 K/UL — SIGNIFICANT CHANGE UP (ref 150–400)
PMV BLD: 11.3 FL — SIGNIFICANT CHANGE UP (ref 7–13)
RBC # BLD: 4.56 M/UL — SIGNIFICANT CHANGE UP (ref 3.8–5.2)
RBC # FLD: 17.4 % — HIGH (ref 10.3–14.5)
WBC # BLD: 8.89 K/UL — SIGNIFICANT CHANGE UP (ref 3.8–10.5)
WBC # FLD AUTO: 8.89 K/UL — SIGNIFICANT CHANGE UP (ref 3.8–10.5)

## 2018-08-02 RX ORDER — INSULIN LISPRO 100/ML
7 VIAL (ML) SUBCUTANEOUS ONCE
Qty: 0 | Refills: 0 | Status: COMPLETED | OUTPATIENT
Start: 2018-08-02 | End: 2018-08-02

## 2018-08-02 RX ORDER — INSULIN LISPRO 100/ML
8 VIAL (ML) SUBCUTANEOUS ONCE
Qty: 0 | Refills: 0 | Status: COMPLETED | OUTPATIENT
Start: 2018-08-02 | End: 2018-08-02

## 2018-08-02 RX ORDER — LANOLIN ALCOHOL/MO/W.PET/CERES
3 CREAM (GRAM) TOPICAL ONCE
Qty: 0 | Refills: 0 | Status: COMPLETED | OUTPATIENT
Start: 2018-08-02 | End: 2018-08-03

## 2018-08-02 RX ORDER — SENNA PLUS 8.6 MG/1
2 TABLET ORAL AT BEDTIME
Qty: 0 | Refills: 0 | Status: DISCONTINUED | OUTPATIENT
Start: 2018-08-02 | End: 2018-08-07

## 2018-08-02 RX ORDER — INSULIN GLARGINE 100 [IU]/ML
20 INJECTION, SOLUTION SUBCUTANEOUS AT BEDTIME
Qty: 0 | Refills: 0 | Status: DISCONTINUED | OUTPATIENT
Start: 2018-08-02 | End: 2018-08-03

## 2018-08-02 RX ORDER — POLYETHYLENE GLYCOL 3350 17 G/17G
17 POWDER, FOR SOLUTION ORAL DAILY
Qty: 0 | Refills: 0 | Status: DISCONTINUED | OUTPATIENT
Start: 2018-08-02 | End: 2018-08-03

## 2018-08-02 RX ORDER — INSULIN LISPRO 100/ML
8 VIAL (ML) SUBCUTANEOUS ONCE
Qty: 0 | Refills: 0 | Status: DISCONTINUED | OUTPATIENT
Start: 2018-08-02 | End: 2018-08-02

## 2018-08-02 RX ORDER — INSULIN LISPRO 100/ML
4 VIAL (ML) SUBCUTANEOUS ONCE
Qty: 0 | Refills: 0 | Status: COMPLETED | OUTPATIENT
Start: 2018-08-02 | End: 2018-08-02

## 2018-08-02 RX ADMIN — Medication 3 MILLILITER(S): at 15:44

## 2018-08-02 RX ADMIN — Medication 100 MILLIGRAM(S): at 08:38

## 2018-08-02 RX ADMIN — SENNA PLUS 2 TABLET(S): 8.6 TABLET ORAL at 21:43

## 2018-08-02 RX ADMIN — Medication 81 MILLIGRAM(S): at 12:04

## 2018-08-02 RX ADMIN — INSULIN GLARGINE 20 UNIT(S): 100 INJECTION, SOLUTION SUBCUTANEOUS at 21:48

## 2018-08-02 RX ADMIN — Medication 3 MILLILITER(S): at 21:58

## 2018-08-02 RX ADMIN — Medication 3 MILLILITER(S): at 03:57

## 2018-08-02 RX ADMIN — Medication 1 APPLICATORFUL: at 17:12

## 2018-08-02 RX ADMIN — Medication 1 TABLET(S): at 06:48

## 2018-08-02 RX ADMIN — CHLORHEXIDINE GLUCONATE 1 APPLICATION(S): 213 SOLUTION TOPICAL at 21:48

## 2018-08-02 RX ADMIN — Medication 8 UNIT(S): at 18:00

## 2018-08-02 RX ADMIN — BUDESONIDE AND FORMOTEROL FUMARATE DIHYDRATE 2 PUFF(S): 160; 4.5 AEROSOL RESPIRATORY (INHALATION) at 10:17

## 2018-08-02 RX ADMIN — Medication 1 TABLET(S): at 12:04

## 2018-08-02 RX ADMIN — Medication 40 MILLIGRAM(S): at 06:48

## 2018-08-02 RX ADMIN — MONTELUKAST 10 MILLIGRAM(S): 4 TABLET, CHEWABLE ORAL at 21:47

## 2018-08-02 RX ADMIN — Medication 7 UNIT(S): at 13:23

## 2018-08-02 RX ADMIN — POLYETHYLENE GLYCOL 3350 17 GRAM(S): 17 POWDER, FOR SOLUTION ORAL at 17:12

## 2018-08-02 RX ADMIN — PANTOPRAZOLE SODIUM 40 MILLIGRAM(S): 20 TABLET, DELAYED RELEASE ORAL at 06:48

## 2018-08-02 RX ADMIN — ENOXAPARIN SODIUM 40 MILLIGRAM(S): 100 INJECTION SUBCUTANEOUS at 12:04

## 2018-08-02 RX ADMIN — Medication 4 UNIT(S): at 08:54

## 2018-08-02 RX ADMIN — BUDESONIDE AND FORMOTEROL FUMARATE DIHYDRATE 2 PUFF(S): 160; 4.5 AEROSOL RESPIRATORY (INHALATION) at 22:26

## 2018-08-02 RX ADMIN — Medication 3 MILLILITER(S): at 10:16

## 2018-08-02 RX ADMIN — Medication 1 TABLET(S): at 17:12

## 2018-08-02 RX ADMIN — Medication 1 APPLICATORFUL: at 06:48

## 2018-08-02 NOTE — PROGRESS NOTE ADULT - SUBJECTIVE AND OBJECTIVE BOX
CHIEF COMPLAINT:    Interval Events:    REVIEW OF SYSTEMS:  Constitutional:   Eyes:  ENT:  CV:  Resp:  GI:  :  MSK:  Integumentary:  Neurological:  Psychiatric:  Endocrine:  Hematologic/Lymphatic:  Allergic/Immunologic:  [ ] All other systems negative  [ ] Unable to assess ROS because ________    OBJECTIVE:  ICU Vital Signs Last 24 Hrs  T(C): 36.8 (02 Aug 2018 06:43), Max: 36.8 (01 Aug 2018 14:07)  T(F): 98.2 (02 Aug 2018 06:43), Max: 98.2 (01 Aug 2018 14:07)  HR: 85 (02 Aug 2018 06:43) (82 - 93)  BP: 123/79 (02 Aug 2018 06:43) (123/68 - 129/75)  BP(mean): --  ABP: --  ABP(mean): --  RR: 18 (02 Aug 2018 06:43) (18 - 19)  SpO2: 98% (02 Aug 2018 06:43) (95% - 98%)        CAPILLARY BLOOD GLUCOSE      POCT Blood Glucose.: 193 mg/dL (02 Aug 2018 08:25)      PHYSICAL EXAM:  General:   HEENT:   Lymph Nodes:  Neck:   Respiratory:   Cardiovascular:   Abdomen:   Extremities:   Skin:   Neurological:  Psychiatry:    HOSPITAL MEDICATIONS:  MEDICATIONS  (STANDING):  ALBUTerol/ipratropium for Nebulization 3 milliLiter(s) Nebulizer every 6 hours  aspirin  chewable 81 milliGRAM(s) Oral daily  buDESOnide  80 MICROgram(s)/formoterol 4.5 MICROgram(s) Inhaler 2 Puff(s) Inhalation two times a day  chlorhexidine 4% Liquid 1 Application(s) Topical <User Schedule>  clotrimazole 2% Vaginal Cream 1 Applicatorful Vaginal two times a day  dextrose 5%. 1000 milliLiter(s) (50 mL/Hr) IV Continuous <Continuous>  dextrose 50% Injectable 12.5 Gram(s) IV Push once  dextrose 50% Injectable 25 Gram(s) IV Push once  dextrose 50% Injectable 25 Gram(s) IV Push once  enoxaparin Injectable 40 milliGRAM(s) SubCutaneous every 24 hours  insulin glargine Injectable (LANTUS) 18 Unit(s) SubCutaneous at bedtime  insulin lispro (HumaLOG) corrective regimen sliding scale   SubCutaneous three times a day before meals  insulin lispro (HumaLOG) corrective regimen sliding scale   SubCutaneous at bedtime  methylPREDNISolone sodium succinate Injectable 40 milliGRAM(s) IV Push daily  montelukast 10 milliGRAM(s) Oral at bedtime  multivitamin 1 Tablet(s) Oral daily  pantoprazole    Tablet 40 milliGRAM(s) Oral before breakfast  trimethoprim  160 mG/sulfamethoxazole 800 mG 1 Tablet(s) Oral two times a day    MEDICATIONS  (PRN):  dextrose 40% Gel 15 Gram(s) Oral once PRN Blood Glucose LESS THAN 70 milliGRAM(s)/deciliter  glucagon  Injectable 1 milliGRAM(s) IntraMuscular once PRN Glucose LESS THAN 70 milligrams/deciliter  guaiFENesin    Syrup 100 milliGRAM(s) Oral every 6 hours PRN Cough      LABS:                        10.3   8.89  )-----------( 186      ( 02 Aug 2018 06:41 )             33.6     08-    141  |  103  |  17  ----------------------------<  129<H>  3.6   |  25  |  0.47<L>    Ca    8.7      01 Aug 2018 06:00      PT/INR - ( 01 Aug 2018 06:00 )   PT: 11.4 SEC;   INR: 1.03          PTT - ( 01 Aug 2018 06:00 )  PTT:24.8 SEC  Urinalysis Basic - ( 2018 09:45 )    Color: PLYEL / Appearance: CLEAR / S.019 / pH: 6.5  Gluc: >1000 / Ketone: NEGATIVE  / Bili: NEGATIVE / Urobili: NORMAL mg/dL   Blood: NEGATIVE / Protein: NEGATIVE mg/dL / Nitrite: NEGATIVE   Leuk Esterase: TRACE / RBC: x / WBC 2-5   Sq Epi: OCC / Non Sq Epi: x / Bacteria: x            MICROBIOLOGY:     RADIOLOGY:  [ ] Reviewed and interpreted by me    PULMONARY FUNCTION TESTS:    EKG: CHIEF COMPLAINT: Patient is a 63y old  Female who presents with a chief complaint of Cough, SOB (2018 20:46)      Interval Events: none     REVIEW OF SYSTEMS: via interpetor   Constitutional: No pain   Eyes:  ENT:  CV: Denies   Resp: + cough yellow sputum   GI: denies   [ x] All other systems negative      OBJECTIVE:  ICU Vital Signs Last 24 Hrs  T(C): 36.8 (02 Aug 2018 06:43), Max: 36.8 (01 Aug 2018 14:07)  T(F): 98.2 (02 Aug 2018 06:43), Max: 98.2 (01 Aug 2018 14:07)  HR: 85 (02 Aug 2018 06:43) (82 - 93)  BP: 123/79 (02 Aug 2018 06:43) (123/68 - 129/75)  BP(mean): --  ABP: --  ABP(mean): --  RR: 18 (02 Aug 2018 06:43) (18 - 19)  SpO2: 98% (02 Aug 2018 06:43) (95% - 98%)        CAPILLARY BLOOD GLUCOSE      POCT Blood Glucose.: 193 mg/dL (02 Aug 2018 08:25)      HOSPITAL MEDICATIONS:  MEDICATIONS  (STANDING):  ALBUTerol/ipratropium for Nebulization 3 milliLiter(s) Nebulizer every 6 hours  aspirin  chewable 81 milliGRAM(s) Oral daily  buDESOnide  80 MICROgram(s)/formoterol 4.5 MICROgram(s) Inhaler 2 Puff(s) Inhalation two times a day  chlorhexidine 4% Liquid 1 Application(s) Topical <User Schedule>  clotrimazole 2% Vaginal Cream 1 Applicatorful Vaginal two times a day  dextrose 5%. 1000 milliLiter(s) (50 mL/Hr) IV Continuous <Continuous>  dextrose 50% Injectable 12.5 Gram(s) IV Push once  dextrose 50% Injectable 25 Gram(s) IV Push once  dextrose 50% Injectable 25 Gram(s) IV Push once  enoxaparin Injectable 40 milliGRAM(s) SubCutaneous every 24 hours  insulin glargine Injectable (LANTUS) 18 Unit(s) SubCutaneous at bedtime  insulin lispro (HumaLOG) corrective regimen sliding scale   SubCutaneous three times a day before meals  insulin lispro (HumaLOG) corrective regimen sliding scale   SubCutaneous at bedtime  methylPREDNISolone sodium succinate Injectable 40 milliGRAM(s) IV Push daily  montelukast 10 milliGRAM(s) Oral at bedtime  multivitamin 1 Tablet(s) Oral daily  pantoprazole    Tablet 40 milliGRAM(s) Oral before breakfast  trimethoprim  160 mG/sulfamethoxazole 800 mG 1 Tablet(s) Oral two times a day    MEDICATIONS  (PRN):  dextrose 40% Gel 15 Gram(s) Oral once PRN Blood Glucose LESS THAN 70 milliGRAM(s)/deciliter  glucagon  Injectable 1 milliGRAM(s) IntraMuscular once PRN Glucose LESS THAN 70 milligrams/deciliter  guaiFENesin    Syrup 100 milliGRAM(s) Oral every 6 hours PRN Cough      LABS:                        10.3   8.89  )-----------( 186      ( 02 Aug 2018 06:41 )             33.6     08    141  |  103  |  17  ----------------------------<  129<H>  3.6   |  25  |  0.47<L>    Ca    8.7      01 Aug 2018 06:00      PT/INR - ( 01 Aug 2018 06:00 )   PT: 11.4 SEC;   INR: 1.03          PTT - ( 01 Aug 2018 06:00 )  PTT:24.8 SEC  Urinalysis Basic - ( 2018 09:45 )    Color: PLYEL / Appearance: CLEAR / S.019 / pH: 6.5  Gluc: >1000 / Ketone: NEGATIVE  / Bili: NEGATIVE / Urobili: NORMAL mg/dL   Blood: NEGATIVE / Protein: NEGATIVE mg/dL / Nitrite: NEGATIVE   Leuk Esterase: TRACE / RBC: x / WBC 2-5   Sq Epi: OCC / Non Sq Epi: x / Bacteria: x            MICROBIOLOGY:     RADIOLOGY:  [ ] Reviewed and interpreted by me    PULMONARY FUNCTION TESTS:    EKG:

## 2018-08-02 NOTE — PROGRESS NOTE ADULT - ATTENDING COMMENTS
Doing better.  Still has cough.  Continue steroids; will need long taper and F/U O/P pulm.  Sputum cx positive Stenotrophomonas SS Bactrim, will continue for now.  May be having some "jitters" with albuterol. Continue DuoNEBs Q6h for now and monitor.  Aggressive chest PT and pulmonary toilet.  Continue to monitor in RCU.

## 2018-08-02 NOTE — PROGRESS NOTE ADULT - PROBLEM SELECTOR PLAN 4
- glucose high 2/2 IV steroids  - continue to monitor  -on SSI  - accuchecks elevated increase lantus - per son home dose is 25u

## 2018-08-03 ENCOUNTER — TRANSCRIPTION ENCOUNTER (OUTPATIENT)
Age: 63
End: 2018-08-03

## 2018-08-03 LAB
BUN SERPL-MCNC: 21 MG/DL — SIGNIFICANT CHANGE UP (ref 7–23)
CALCIUM SERPL-MCNC: 8.8 MG/DL — SIGNIFICANT CHANGE UP (ref 8.4–10.5)
CHLORIDE SERPL-SCNC: 100 MMOL/L — SIGNIFICANT CHANGE UP (ref 98–107)
CO2 SERPL-SCNC: 24 MMOL/L — SIGNIFICANT CHANGE UP (ref 22–31)
CREAT SERPL-MCNC: 0.58 MG/DL — SIGNIFICANT CHANGE UP (ref 0.5–1.3)
GLUCOSE BLDC GLUCOMTR-MCNC: 134 MG/DL — HIGH (ref 70–99)
GLUCOSE BLDC GLUCOMTR-MCNC: 193 MG/DL — HIGH (ref 70–99)
GLUCOSE BLDC GLUCOMTR-MCNC: 289 MG/DL — HIGH (ref 70–99)
GLUCOSE BLDC GLUCOMTR-MCNC: 475 MG/DL — CRITICAL HIGH (ref 70–99)
GLUCOSE SERPL-MCNC: 286 MG/DL — HIGH (ref 70–99)
HCT VFR BLD CALC: 32.4 % — LOW (ref 34.5–45)
HGB BLD-MCNC: 10 G/DL — LOW (ref 11.5–15.5)
MCHC RBC-ENTMCNC: 22.4 PG — LOW (ref 27–34)
MCHC RBC-ENTMCNC: 30.9 % — LOW (ref 32–36)
MCV RBC AUTO: 72.6 FL — LOW (ref 80–100)
NRBC # FLD: 0 — SIGNIFICANT CHANGE UP
PLATELET # BLD AUTO: 197 K/UL — SIGNIFICANT CHANGE UP (ref 150–400)
PMV BLD: 11.2 FL — SIGNIFICANT CHANGE UP (ref 7–13)
POTASSIUM SERPL-MCNC: 3.9 MMOL/L — SIGNIFICANT CHANGE UP (ref 3.5–5.3)
POTASSIUM SERPL-SCNC: 3.9 MMOL/L — SIGNIFICANT CHANGE UP (ref 3.5–5.3)
RBC # BLD: 4.46 M/UL — SIGNIFICANT CHANGE UP (ref 3.8–5.2)
RBC # FLD: 17.4 % — HIGH (ref 10.3–14.5)
SODIUM SERPL-SCNC: 136 MMOL/L — SIGNIFICANT CHANGE UP (ref 135–145)
WBC # BLD: 9.14 K/UL — SIGNIFICANT CHANGE UP (ref 3.8–10.5)
WBC # FLD AUTO: 9.14 K/UL — SIGNIFICANT CHANGE UP (ref 3.8–10.5)

## 2018-08-03 RX ORDER — POLYETHYLENE GLYCOL 3350 17 G/17G
17 POWDER, FOR SOLUTION ORAL
Qty: 0 | Refills: 0 | Status: DISCONTINUED | OUTPATIENT
Start: 2018-08-03 | End: 2018-08-07

## 2018-08-03 RX ORDER — SODIUM CHLORIDE 9 MG/ML
4 INJECTION INTRAMUSCULAR; INTRAVENOUS; SUBCUTANEOUS
Qty: 0 | Refills: 0 | Status: DISCONTINUED | OUTPATIENT
Start: 2018-08-03 | End: 2018-08-07

## 2018-08-03 RX ORDER — ALBUTEROL 90 UG/1
2.5 AEROSOL, METERED ORAL
Qty: 0 | Refills: 0 | Status: DISCONTINUED | OUTPATIENT
Start: 2018-08-03 | End: 2018-08-04

## 2018-08-03 RX ORDER — IPRATROPIUM BROMIDE 0.2 MG/ML
500 SOLUTION, NON-ORAL INHALATION EVERY 6 HOURS
Qty: 0 | Refills: 0 | Status: DISCONTINUED | OUTPATIENT
Start: 2018-08-03 | End: 2018-08-04

## 2018-08-03 RX ORDER — SODIUM CHLORIDE 9 MG/ML
3 INJECTION INTRAMUSCULAR; INTRAVENOUS; SUBCUTANEOUS
Qty: 0 | Refills: 0 | Status: DISCONTINUED | OUTPATIENT
Start: 2018-08-03 | End: 2018-08-03

## 2018-08-03 RX ORDER — INSULIN LISPRO 100/ML
10 VIAL (ML) SUBCUTANEOUS ONCE
Qty: 0 | Refills: 0 | Status: COMPLETED | OUTPATIENT
Start: 2018-08-03 | End: 2018-08-03

## 2018-08-03 RX ORDER — INSULIN GLARGINE 100 [IU]/ML
25 INJECTION, SOLUTION SUBCUTANEOUS AT BEDTIME
Qty: 0 | Refills: 0 | Status: DISCONTINUED | OUTPATIENT
Start: 2018-08-03 | End: 2018-08-07

## 2018-08-03 RX ADMIN — SODIUM CHLORIDE 4 MILLILITER(S): 9 INJECTION INTRAMUSCULAR; INTRAVENOUS; SUBCUTANEOUS at 21:47

## 2018-08-03 RX ADMIN — Medication 40 MILLIGRAM(S): at 06:40

## 2018-08-03 RX ADMIN — Medication 100 MILLIGRAM(S): at 09:04

## 2018-08-03 RX ADMIN — CHLORHEXIDINE GLUCONATE 1 APPLICATION(S): 213 SOLUTION TOPICAL at 22:35

## 2018-08-03 RX ADMIN — Medication 1 TABLET(S): at 06:40

## 2018-08-03 RX ADMIN — Medication 3 MILLIGRAM(S): at 05:28

## 2018-08-03 RX ADMIN — POLYETHYLENE GLYCOL 3350 17 GRAM(S): 17 POWDER, FOR SOLUTION ORAL at 12:14

## 2018-08-03 RX ADMIN — Medication 3 MILLILITER(S): at 09:39

## 2018-08-03 RX ADMIN — Medication 1: at 09:00

## 2018-08-03 RX ADMIN — Medication 1 TABLET(S): at 18:30

## 2018-08-03 RX ADMIN — Medication 3: at 18:30

## 2018-08-03 RX ADMIN — Medication 10 UNIT(S): at 13:22

## 2018-08-03 RX ADMIN — ENOXAPARIN SODIUM 40 MILLIGRAM(S): 100 INJECTION SUBCUTANEOUS at 09:03

## 2018-08-03 RX ADMIN — Medication 1 APPLICATORFUL: at 22:36

## 2018-08-03 RX ADMIN — INSULIN GLARGINE 25 UNIT(S): 100 INJECTION, SOLUTION SUBCUTANEOUS at 22:34

## 2018-08-03 RX ADMIN — MONTELUKAST 10 MILLIGRAM(S): 4 TABLET, CHEWABLE ORAL at 22:35

## 2018-08-03 RX ADMIN — POLYETHYLENE GLYCOL 3350 17 GRAM(S): 17 POWDER, FOR SOLUTION ORAL at 22:35

## 2018-08-03 RX ADMIN — Medication 500 MICROGRAM(S): at 15:41

## 2018-08-03 RX ADMIN — SENNA PLUS 2 TABLET(S): 8.6 TABLET ORAL at 22:35

## 2018-08-03 RX ADMIN — PANTOPRAZOLE SODIUM 40 MILLIGRAM(S): 20 TABLET, DELAYED RELEASE ORAL at 06:40

## 2018-08-03 RX ADMIN — Medication 81 MILLIGRAM(S): at 12:14

## 2018-08-03 RX ADMIN — Medication 1 APPLICATORFUL: at 05:28

## 2018-08-03 RX ADMIN — BUDESONIDE AND FORMOTEROL FUMARATE DIHYDRATE 2 PUFF(S): 160; 4.5 AEROSOL RESPIRATORY (INHALATION) at 09:39

## 2018-08-03 RX ADMIN — Medication 3 MILLILITER(S): at 03:44

## 2018-08-03 RX ADMIN — Medication 1 TABLET(S): at 12:14

## 2018-08-03 RX ADMIN — Medication 1 APPLICATORFUL: at 18:58

## 2018-08-03 RX ADMIN — BUDESONIDE AND FORMOTEROL FUMARATE DIHYDRATE 2 PUFF(S): 160; 4.5 AEROSOL RESPIRATORY (INHALATION) at 21:52

## 2018-08-03 RX ADMIN — Medication 500 MICROGRAM(S): at 21:38

## 2018-08-03 NOTE — DISCHARGE NOTE ADULT - CARE PLAN
Principal Discharge DX:	Bronchiectasis  Assessment and plan of treatment:	You have been given a course of antibiotics Finish the prescription  Secondary Diagnosis:	DM (diabetes mellitus)  Secondary Diagnosis:	Hypertension  Secondary Diagnosis:	Productive cough  Secondary Diagnosis:	Vaginal itching Principal Discharge DX:	Bronchiectasis  Goal:	resolved  Assessment and plan of treatment:	You have been given a course of antibiotics Finish the prescription outpatient as directed  Continue steroids as directed   Follow up with pulmonary outpatient  Secondary Diagnosis:	DM (diabetes mellitus)  Assessment and plan of treatment:	Continue consistent carbohydrate diet.  Monitor blood glucose levels throughout the day before meals and at bedtime.  Record blood sugars and bring to outpatient providers appointment in order to be reviewed by your doctor for management modifications.  Be aware of diabetes management symptoms including feeling cool and clammy may be related to low glucose levels.  Feeling hot and dry may indicate high glucose levels.  If  you feel these symptoms, check your blood sugar.  Make regular podiatry appointments in order to have feet checked for wounds and toe nails cut by a doctor to prevent infections.  Secondary Diagnosis:	Hypertension  Assessment and plan of treatment:	Continue blood pressure medication regimen as directed. Monitor for any visual changes, headaches or dizziness.  Monitor blood pressure regularly.  Follow up with your PCP for further management for high blood pressure.  Secondary Diagnosis:	Productive cough  Assessment and plan of treatment:	Supportive care  Continue steroids  Secondary Diagnosis:	Vaginal itching Principal Discharge DX:	Bronchiectasis  Goal:	resolved  Assessment and plan of treatment:	You have been given a course of antibiotics Finish the prescription outpatient as directed  Continue steroids as directed   Follow up with pulmonary outpatient - appointment made for August 20th at 1 PM with Dr. Young partner Dr. Spence  Secondary Diagnosis:	DM (diabetes mellitus)  Assessment and plan of treatment:	Continue consistent carbohydrate diet.  Monitor blood glucose levels throughout the day before meals and at bedtime.  Record blood sugars and bring to outpatient providers appointment in order to be reviewed by your doctor for management modifications.  Be aware of diabetes management symptoms including feeling cool and clammy may be related to low glucose levels.  Feeling hot and dry may indicate high glucose levels.  If  you feel these symptoms, check your blood sugar.  Make regular podiatry appointments in order to have feet checked for wounds and toe nails cut by a doctor to prevent infections.  Follow up with your endocrinologist  Secondary Diagnosis:	Hypertension  Assessment and plan of treatment:	Continue blood pressure medication regimen as directed. Monitor for any visual changes, headaches or dizziness.  Monitor blood pressure regularly.  Follow up with your PCP for further management for high blood pressure.  During your hospital stay you were not given your amlodipine - DO NOT TAKE YOU AMLODIPINE UNTIL YOU ARE EVALUATED BY YOUR PCP ------   Make an appointment to see your primary care provider within 1 week of discharge for hospital follow up  Secondary Diagnosis:	Productive cough  Assessment and plan of treatment:	Supportive care  Continue steroids   Continue cough suppresant  Secondary Diagnosis:	Vaginal itching  Assessment and plan of treatment:	You were treated during your admission  Secondary Diagnosis:	Hemorrhoids Principal Discharge DX:	Bronchiectasis  Goal:	resolved  Assessment and plan of treatment:	You have been given a course of antibiotics Finish the prescription outpatient as directed  Continue steroids as directed   Follow up with pulmonary outpatient - appointment made for August 20th at 1 PM with Dr. Young partner Dr. Spence  Secondary Diagnosis:	DM (diabetes mellitus)  Assessment and plan of treatment:	Continue consistent carbohydrate diet.  Monitor blood glucose levels throughout the day before meals and at bedtime.  Record blood sugars and bring to outpatient providers appointment in order to be reviewed by your doctor for management modifications.  Be aware of diabetes management symptoms including feeling cool and clammy may be related to low glucose levels.  Feeling hot and dry may indicate high glucose levels.  If  you feel these symptoms, check your blood sugar.  Make regular podiatry appointments in order to have feet checked for wounds and toe nails cut by a doctor to prevent infections.  Follow up with your endocrinologist  Secondary Diagnosis:	Hypertension  Assessment and plan of treatment:	Continue blood pressure medication regimen as directed. Monitor for any visual changes, headaches or dizziness.  Monitor blood pressure regularly.  Follow up with your PCP for further management for high blood pressure.  During your hospital stay you were not given your amlodipine - DO NOT TAKE YOU AMLODIPINE UNTIL YOU ARE EVALUATED BY YOUR PCP ------   Make an appointment to see your primary care provider within 1 week of discharge for hospital follow up  Secondary Diagnosis:	Productive cough  Assessment and plan of treatment:	Supportive care  Continue steroids   Continue cough suppresant  Secondary Diagnosis:	Vaginal itching  Assessment and plan of treatment:	You were treated during your admission  Secondary Diagnosis:	Hemorrhoid  Assessment and plan of treatment:	Continue hemorrhoid treatments as needed  Secondary Diagnosis:	Elevated liver enzymes  Assessment and plan of treatment:	Have your liver function levels checked within 1 week ---- they were noted slightly elevated frederick sue admission  Follow up with your Primary Care Provider for repeat lab work Principal Discharge DX:	Bronchiectasis  Goal:	resolved  Assessment and plan of treatment:	You have been given a course of antibiotics Finish the prescription outpatient as directed  Continue steroids as directed   Follow up with pulmonary outpatient - appointment made for August 20th at 1 PM with Dr. Young partner Dr. Spence  Secondary Diagnosis:	DM (diabetes mellitus)  Assessment and plan of treatment:	Continue consistent carbohydrate diet.  Monitor blood glucose levels throughout the day before meals and at bedtime.  Record blood sugars and bring to outpatient providers appointment in order to be reviewed by your doctor for management modifications.  Be aware of diabetes management symptoms including feeling cool and clammy may be related to low glucose levels.  Feeling hot and dry may indicate high glucose levels.  If  you feel these symptoms, check your blood sugar.  Make regular podiatry appointments in order to have feet checked for wounds and toe nails cut by a doctor to prevent infections.  Follow up with your endocrinologist  Secondary Diagnosis:	Hypertension  Assessment and plan of treatment:	Continue blood pressure medication regimen as directed. Monitor for any visual changes, headaches or dizziness.  Monitor blood pressure regularly.  Follow up with your PCP for further management for high blood pressure.  During your hospital stay you were not given your amlodipine - DO NOT TAKE YOU AMLODIPINE UNTIL YOU ARE EVALUATED BY YOUR PCP ------   Make an appointment to see your primary care provider within 1 week of discharge for hospital follow up  Secondary Diagnosis:	Productive cough  Assessment and plan of treatment:	Supportive care  Continue steroids   Continue cough suppresant  Secondary Diagnosis:	Vaginal itching  Assessment and plan of treatment:	You were treated during your admission  Secondary Diagnosis:	Hemorrhoid  Assessment and plan of treatment:	Continue hemorrhoid treatments as needed  Secondary Diagnosis:	Elevated liver enzymes  Assessment and plan of treatment:	Have your liver function levels checked within 1 week ---- they were noted slightly elevated during your admission  Follow up with your Primary Care Provider for repeat lab work

## 2018-08-03 NOTE — DISCHARGE NOTE ADULT - CARE PROVIDER_API CALL
Shelli Young), Critical Care Medicine; Pulmonary Disease  [address] [place] [address]  Phone: [phone]  Fax: [phone]

## 2018-08-03 NOTE — DISCHARGE NOTE ADULT - MEDICATION SUMMARY - MEDICATIONS TO TAKE
I will START or STAY ON the medications listed below when I get home from the hospital:    predniSONE 10 mg oral tablet  -- 4 tab(s) by mouth once a day for 4 days then 3 tabs oral daily for 4 days then 2 tabs oral daily for 4 days the 1 tab oral daily for 4 days40  -- It is very important that you take or use this exactly as directed.  Do not skip doses or discontinue unless directed by your doctor.  Obtain medical advice before taking any non-prescription drugs as some may affect the action of this medication.  Take with food or milk.    -- Indication: For ABPA (allergic bronchopulmonary aspergillosis)    aspirin 81 mg oral tablet, chewable  -- 1 tab(s) by mouth once a day  -- Indication: For CAD ppx    metFORMIN 1000 mg oral tablet  -- 1 tab(s) by mouth 2 times a day  -- Indication: For DM (diabetes mellitus)    Basaglar KwikPen 100 units/mL subcutaneous solution  -- 25 unit(s) subcutaneous once a day (at bedtime)   -- Do not drink alcoholic beverages when taking this medication.  It is very important that you take or use this exactly as directed.  Do not skip doses or discontinue unless directed by your doctor.  Keep in refrigerator.  Do not freeze.    -- Indication: For DM (diabetes mellitus)    Symbicort 80 mcg-4.5 mcg/inh inhalation aerosol  -- 2 puff(s) inhaled 2 times a day  -- Indication: For Bronchiectasis    albuterol 2.5 mg/3 mL (0.083%) inhalation solution  -- 3 milliliter(s) by nebulizer every 6 hours   -- For inhalation only.  It is very important that you take or use this exactly as directed.  Do not skip doses or discontinue unless directed by your doctor.  Obtain medical advice before taking any non-prescription drugs as some may affect the action of this medication.    -- Indication: For Bronchiectasis    hydrocortisone 25 mg rectal suppository  -- 1 suppository(ies) rectally 2 times a day, As needed, rectally pain  -- Indication: For Hemorrhoids     guaiFENesin 100 mg/5 mL oral liquid  -- 5 milliliter(s) by mouth every 6 hours, As needed, Cough  -- Indication: For cough    senna oral tablet  -- 2 tab(s) by mouth once a day (at bedtime)  -- Indication: For constipation    bisacodyl 10 mg rectal suppository  -- 1 suppository(ies) rectally once a day, As needed, Constipation  -- Indication: For constipation    polyethylene glycol 3350 oral powder for reconstitution  -- 17 gram(s) by mouth 2 times a day  -- Indication: For constipation    montelukast 10 mg oral tablet  -- 1 tab(s) by mouth once a day (at bedtime)  -- Indication: For Allergy - cough    pantoprazole 40 mg oral delayed release tablet  -- 1 tab(s) by mouth once a day  -- Indication: For GERD    sulfamethoxazole-trimethoprim 800 mg-160 mg oral tablet  -- 1 tab(s) by mouth 2 times a day  -- Indication: For Possible pulmonry infection    Daily Atul oral tablet  -- 1 tab(s) by mouth once a day  -- Indication: For supplement    Vitamin B12 1000 mcg oral tablet  -- 1 tab(s) by mouth once a day  -- Indication: For supplement

## 2018-08-03 NOTE — DISCHARGE NOTE ADULT - HOSPITAL COURSE
62 yo woman with history of allergic bronchopulmonary aspergillosis (s/p Itraconazole in 2015), diffuse cystic bronchiectasis with Stenotrophomonas colonization, RUL thin-walled cavitary lesion, HTN, and DM2 presents with worsening cough productive of thick, yellow, nonbloody sputum for the past 1 month, admitted for possible bronchiectasis exacerbation vs. progression and r/o active TB vs. BRENTON.    Productive cough.  - RVP negative. CXR with no evidence of focal consolidations/infiltrates or new cavitary lesions and no leukocytosis on labs.   CT chest Bilateral superimposed tree-in-bud opacities or small nodules related to foci of mucoid impacted distal dilated airways are unchanged  Started pt on Bactrim DS     Bronchiectasis.    Can be in setting of acute exacerbation vs. progression of disease.    Hypertension.   BPs in acceptable range.     DM (diabetes mellitus).    Aic 9.8  [facility] 62 yo woman with history of allergic bronchopulmonary aspergillosis (s/p Itraconazole in 2015), diffuse cystic bronchiectasis with Stenotrophomonas colonization, RUL thin-walled cavitary lesion, HTN, and DM2 presents with worsening cough productive of thick, yellow, nonbloody sputum for the past 1 month, admitted for possible bronchiectasis exacerbation vs. progression and r/o active TB vs. BRENTON.    Productive cough.  - RVP negative. CXR with no evidence of focal consolidations/infiltrates or new cavitary lesions and no leukocytosis on labs.   CT chest Bilateral superimposed tree-in-bud opacities or small nodules related to foci of mucoid impacted distal dilated airways are unchanged  Started pt on Bactrim DS - to complete 14 day course  prednisone 40mg po on taper    Bronchiectasis.  Can be in setting of acute exacerbation vs. progression of disease-   Hypertension. BPs in acceptable range.   DM (diabetes mellitus).  Aic 9.8 -[facility]     dispo: home

## 2018-08-03 NOTE — PROGRESS NOTE ADULT - ATTENDING COMMENTS
Patient states she is coughing up thick brown / yellow sputum. Complaining of some jitteriness, likely beta-agonist mediated.  Aggressive chest PT and pulmonary toilet.  Change NEBs to hyperSAL and Atrovent ATC, can keep albuterol PRN.  Will need long steroid taper, keep at 40mg daily for now.  c/w Batrim for Stenotrophomonas.  Will need O/P F/U pulm on D/C.

## 2018-08-03 NOTE — DISCHARGE NOTE ADULT - SECONDARY DIAGNOSIS.
DM (diabetes mellitus) Hypertension Productive cough Vaginal itching Hemorrhoids Elevated liver enzymes Hemorrhoid

## 2018-08-03 NOTE — DISCHARGE NOTE ADULT - PLAN OF CARE
You have been given a course of antibiotics Finish the prescription resolved You have been given a course of antibiotics Finish the prescription outpatient as directed  Continue steroids as directed   Follow up with pulmonary outpatient Continue consistent carbohydrate diet.  Monitor blood glucose levels throughout the day before meals and at bedtime.  Record blood sugars and bring to outpatient providers appointment in order to be reviewed by your doctor for management modifications.  Be aware of diabetes management symptoms including feeling cool and clammy may be related to low glucose levels.  Feeling hot and dry may indicate high glucose levels.  If  you feel these symptoms, check your blood sugar.  Make regular podiatry appointments in order to have feet checked for wounds and toe nails cut by a doctor to prevent infections. Continue blood pressure medication regimen as directed. Monitor for any visual changes, headaches or dizziness.  Monitor blood pressure regularly.  Follow up with your PCP for further management for high blood pressure. Supportive care  Continue steroids You have been given a course of antibiotics Finish the prescription outpatient as directed  Continue steroids as directed   Follow up with pulmonary outpatient - appointment made for August 20th at 1 PM with Dr. Young partner Dr. Spence Continue consistent carbohydrate diet.  Monitor blood glucose levels throughout the day before meals and at bedtime.  Record blood sugars and bring to outpatient providers appointment in order to be reviewed by your doctor for management modifications.  Be aware of diabetes management symptoms including feeling cool and clammy may be related to low glucose levels.  Feeling hot and dry may indicate high glucose levels.  If  you feel these symptoms, check your blood sugar.  Make regular podiatry appointments in order to have feet checked for wounds and toe nails cut by a doctor to prevent infections.  Follow up with your endocrinologist Continue blood pressure medication regimen as directed. Monitor for any visual changes, headaches or dizziness.  Monitor blood pressure regularly.  Follow up with your PCP for further management for high blood pressure.  During your hospital stay you were not given your amlodipine - DO NOT TAKE YOU AMLODIPINE UNTIL YOU ARE EVALUATED BY YOUR PCP ------   Make an appointment to see your primary care provider within 1 week of discharge for hospital follow up Supportive care  Continue steroids   Continue cough suppresant You were treated during your admission Continue hemorrhoid treatments as needed Have your liver function levels checked within 1 week ---- they were noted slightly elevated freder[facility] sue admission  Follow up with your Primary Care Provider for repeat lab work Have your liver function levels checked within 1 week ---- they were noted slightly elevated during your admission  Follow up with your Primary Care Provider for repeat lab work

## 2018-08-03 NOTE — DISCHARGE NOTE ADULT - PATIENT PORTAL LINK FT
You can access the [facility] Patient Portal, offered by [facility], by registering with the following website: [email or web address]

## 2018-08-03 NOTE — PROGRESS NOTE ADULT - SUBJECTIVE AND OBJECTIVE BOX
CHIEF COMPLAINT:    Interval Events:    REVIEW OF SYSTEMS:  Constitutional:   Eyes:  ENT:  CV:  Resp:  GI:  :  MSK:  Integumentary:  Neurological:  Psychiatric:  Endocrine:  Hematologic/Lymphatic:  Allergic/Immunologic:  [ ] All other systems negative  [ ] Unable to assess ROS because ________    OBJECTIVE:  ICU Vital Signs Last 24 Hrs  T(C): 36.3 (03 Aug 2018 06:42), Max: 36.7 (02 Aug 2018 22:00)  T(F): 97.3 (03 Aug 2018 06:42), Max: 98 (02 Aug 2018 22:00)  HR: 75 (03 Aug 2018 06:42) (74 - 82)  BP: 124/81 (03 Aug 2018 06:42) (124/81 - 128/82)  BP(mean): --  ABP: --  ABP(mean): --  RR: 19 (03 Aug 2018 06:42) (18 - 19)  SpO2: 99% (03 Aug 2018 06:42) (98% - 99%)        CAPILLARY BLOOD GLUCOSE      POCT Blood Glucose.: 193 mg/dL (03 Aug 2018 08:38)      PHYSICAL EXAM:  General:   HEENT:   Lymph Nodes:  Neck:   Respiratory:   Cardiovascular:   Abdomen:   Extremities:   Skin:   Neurological:  Psychiatry:    HOSPITAL MEDICATIONS:  MEDICATIONS  (STANDING):  ALBUTerol/ipratropium for Nebulization 3 milliLiter(s) Nebulizer every 6 hours  aspirin  chewable 81 milliGRAM(s) Oral daily  buDESOnide  80 MICROgram(s)/formoterol 4.5 MICROgram(s) Inhaler 2 Puff(s) Inhalation two times a day  chlorhexidine 4% Liquid 1 Application(s) Topical <User Schedule>  clotrimazole 2% Vaginal Cream 1 Applicatorful Vaginal two times a day  dextrose 5%. 1000 milliLiter(s) (50 mL/Hr) IV Continuous <Continuous>  dextrose 50% Injectable 12.5 Gram(s) IV Push once  dextrose 50% Injectable 25 Gram(s) IV Push once  dextrose 50% Injectable 25 Gram(s) IV Push once  enoxaparin Injectable 40 milliGRAM(s) SubCutaneous every 24 hours  insulin glargine Injectable (LANTUS) 20 Unit(s) SubCutaneous at bedtime  insulin lispro (HumaLOG) corrective regimen sliding scale   SubCutaneous three times a day before meals  insulin lispro (HumaLOG) corrective regimen sliding scale   SubCutaneous at bedtime  montelukast 10 milliGRAM(s) Oral at bedtime  multivitamin 1 Tablet(s) Oral daily  pantoprazole    Tablet 40 milliGRAM(s) Oral before breakfast  polyethylene glycol 3350 17 Gram(s) Oral daily  predniSONE   Tablet 40 milliGRAM(s) Oral daily  senna 2 Tablet(s) Oral at bedtime  trimethoprim  160 mG/sulfamethoxazole 800 mG 1 Tablet(s) Oral two times a day    MEDICATIONS  (PRN):  bisacodyl Suppository 10 milliGRAM(s) Rectal daily PRN Constipation  dextrose 40% Gel 15 Gram(s) Oral once PRN Blood Glucose LESS THAN 70 milliGRAM(s)/deciliter  glucagon  Injectable 1 milliGRAM(s) IntraMuscular once PRN Glucose LESS THAN 70 milligrams/deciliter  guaiFENesin    Syrup 100 milliGRAM(s) Oral every 6 hours PRN Cough      LABS:                        10.0   9.14  )-----------( 197      ( 03 Aug 2018 06:25 )             32.4     08-03    136  |  100  |  21  ----------------------------<  286<H>  3.9   |  24  |  0.58    Ca    8.8      03 Aug 2018 06:25                MICROBIOLOGY:     RADIOLOGY:  [ ] Reviewed and interpreted by me    PULMONARY FUNCTION TESTS:    EKG: CHIEF COMPLAINT: denies complaints    Interval Events: no events overnight    REVIEW OF SYSTEMS:  Constitutional:   Eyes:  ENT:  CV: denies chest pain  Resp: denies shortness of breath, c/o cough  GI: denies abd pain  :  MSK:  Integumentary:  Neurological:  Psychiatric:  Endocrine:  Hematologic/Lymphatic:  Allergic/Immunologic:  [x ] All other systems negative  [ ] Unable to assess ROS because ________    OBJECTIVE:  ICU Vital Signs Last 24 Hrs  T(C): 36.3 (03 Aug 2018 06:42), Max: 36.7 (02 Aug 2018 22:00)  T(F): 97.3 (03 Aug 2018 06:42), Max: 98 (02 Aug 2018 22:00)  HR: 75 (03 Aug 2018 06:42) (74 - 82)  BP: 124/81 (03 Aug 2018 06:42) (124/81 - 128/82)  BP(mean): --  ABP: --  ABP(mean): --  RR: 19 (03 Aug 2018 06:42) (18 - 19)  SpO2: 99% (03 Aug 2018 06:42) (98% - 99%)        CAPILLARY BLOOD GLUCOSE      POCT Blood Glucose.: 193 mg/dL (03 Aug 2018 08:38)    HOSPITAL MEDICATIONS:  MEDICATIONS  (STANDING):  ALBUTerol/ipratropium for Nebulization 3 milliLiter(s) Nebulizer every 6 hours  aspirin  chewable 81 milliGRAM(s) Oral daily  buDESOnide  80 MICROgram(s)/formoterol 4.5 MICROgram(s) Inhaler 2 Puff(s) Inhalation two times a day  chlorhexidine 4% Liquid 1 Application(s) Topical <User Schedule>  clotrimazole 2% Vaginal Cream 1 Applicatorful Vaginal two times a day  dextrose 5%. 1000 milliLiter(s) (50 mL/Hr) IV Continuous <Continuous>  dextrose 50% Injectable 12.5 Gram(s) IV Push once  dextrose 50% Injectable 25 Gram(s) IV Push once  dextrose 50% Injectable 25 Gram(s) IV Push once  enoxaparin Injectable 40 milliGRAM(s) SubCutaneous every 24 hours  insulin glargine Injectable (LANTUS) 20 Unit(s) SubCutaneous at bedtime  insulin lispro (HumaLOG) corrective regimen sliding scale   SubCutaneous three times a day before meals  insulin lispro (HumaLOG) corrective regimen sliding scale   SubCutaneous at bedtime  montelukast 10 milliGRAM(s) Oral at bedtime  multivitamin 1 Tablet(s) Oral daily  pantoprazole    Tablet 40 milliGRAM(s) Oral before breakfast  polyethylene glycol 3350 17 Gram(s) Oral daily  predniSONE   Tablet 40 milliGRAM(s) Oral daily  senna 2 Tablet(s) Oral at bedtime  trimethoprim  160 mG/sulfamethoxazole 800 mG 1 Tablet(s) Oral two times a day    MEDICATIONS  (PRN):  bisacodyl Suppository 10 milliGRAM(s) Rectal daily PRN Constipation  dextrose 40% Gel 15 Gram(s) Oral once PRN Blood Glucose LESS THAN 70 milliGRAM(s)/deciliter  glucagon  Injectable 1 milliGRAM(s) IntraMuscular once PRN Glucose LESS THAN 70 milligrams/deciliter  guaiFENesin    Syrup 100 milliGRAM(s) Oral every 6 hours PRN Cough      LABS:                        10.0   9.14  )-----------( 197      ( 03 Aug 2018 06:25 )             32.4     08-03    136  |  100  |  21  ----------------------------<  286<H>  3.9   |  24  |  0.58    Ca    8.8      03 Aug 2018 06:25                MICROBIOLOGY:     RADIOLOGY:  [ ] Reviewed and interpreted by me    PULMONARY FUNCTION TESTS:    EKG:

## 2018-08-03 NOTE — PROGRESS NOTE ADULT - PROBLEM SELECTOR PLAN 2
- Likely 2/2 acute exacerbation vs. progression of bronchiectasis vs. BRENTON.  - RVP negative.  - CT chest noncontrast reviewed, continue current treatment  - c/w IV steroids  - c/w Bactrim for PCP ppx  - oxygen supplement as needed

## 2018-08-03 NOTE — PROGRESS NOTE ADULT - PROBLEM SELECTOR PLAN 1
- Likely 2/2 acute exacerbation vs. progression of bronchiectasis vs. BRENTON.  - RVP negative.  - CT chest noncontrast reviewed, continue current treatment  - c/w IV steroids, slow prednisone taper on d/c  - c/w Bactrim for PCP ppx  - oxygen supplement as needed  - Aggressive chest PT and pulmonary toilet

## 2018-08-04 DIAGNOSIS — B44.81 ALLERGIC BRONCHOPULMONARY ASPERGILLOSIS: ICD-10-CM

## 2018-08-04 LAB
BACTERIA BLD CULT: SIGNIFICANT CHANGE UP
BACTERIA BLD CULT: SIGNIFICANT CHANGE UP
BUN SERPL-MCNC: 23 MG/DL — SIGNIFICANT CHANGE UP (ref 7–23)
CALCIUM SERPL-MCNC: 9.2 MG/DL — SIGNIFICANT CHANGE UP (ref 8.4–10.5)
CHLORIDE SERPL-SCNC: 98 MMOL/L — SIGNIFICANT CHANGE UP (ref 98–107)
CO2 SERPL-SCNC: 25 MMOL/L — SIGNIFICANT CHANGE UP (ref 22–31)
CREAT SERPL-MCNC: 0.67 MG/DL — SIGNIFICANT CHANGE UP (ref 0.5–1.3)
GLUCOSE BLDC GLUCOMTR-MCNC: 140 MG/DL — HIGH (ref 70–99)
GLUCOSE BLDC GLUCOMTR-MCNC: 256 MG/DL — HIGH (ref 70–99)
GLUCOSE BLDC GLUCOMTR-MCNC: 344 MG/DL — HIGH (ref 70–99)
GLUCOSE BLDC GLUCOMTR-MCNC: 95 MG/DL — SIGNIFICANT CHANGE UP (ref 70–99)
GLUCOSE SERPL-MCNC: 124 MG/DL — HIGH (ref 70–99)
HCT VFR BLD CALC: 35.1 % — SIGNIFICANT CHANGE UP (ref 34.5–45)
HGB BLD-MCNC: 10.8 G/DL — LOW (ref 11.5–15.5)
MCHC RBC-ENTMCNC: 22.5 PG — LOW (ref 27–34)
MCHC RBC-ENTMCNC: 30.8 % — LOW (ref 32–36)
MCV RBC AUTO: 73.3 FL — LOW (ref 80–100)
NRBC # FLD: 0 — SIGNIFICANT CHANGE UP
PLATELET # BLD AUTO: 207 K/UL — SIGNIFICANT CHANGE UP (ref 150–400)
PMV BLD: 10.8 FL — SIGNIFICANT CHANGE UP (ref 7–13)
POTASSIUM SERPL-MCNC: 4.2 MMOL/L — SIGNIFICANT CHANGE UP (ref 3.5–5.3)
POTASSIUM SERPL-SCNC: 4.2 MMOL/L — SIGNIFICANT CHANGE UP (ref 3.5–5.3)
RBC # BLD: 4.79 M/UL — SIGNIFICANT CHANGE UP (ref 3.8–5.2)
RBC # FLD: 17.5 % — HIGH (ref 10.3–14.5)
SODIUM SERPL-SCNC: 136 MMOL/L — SIGNIFICANT CHANGE UP (ref 135–145)
WBC # BLD: 9.51 K/UL — SIGNIFICANT CHANGE UP (ref 3.8–10.5)
WBC # FLD AUTO: 9.51 K/UL — SIGNIFICANT CHANGE UP (ref 3.8–10.5)

## 2018-08-04 PROCEDURE — 99232 SBSQ HOSP IP/OBS MODERATE 35: CPT

## 2018-08-04 RX ORDER — HYDROCORTISONE 1 %
1 OINTMENT (GRAM) TOPICAL
Qty: 0 | Refills: 0 | Status: DISCONTINUED | OUTPATIENT
Start: 2018-08-04 | End: 2018-08-07

## 2018-08-04 RX ORDER — INSULIN LISPRO 100/ML
3 VIAL (ML) SUBCUTANEOUS
Qty: 0 | Refills: 0 | Status: DISCONTINUED | OUTPATIENT
Start: 2018-08-04 | End: 2018-08-07

## 2018-08-04 RX ORDER — IPRATROPIUM/ALBUTEROL SULFATE 18-103MCG
3 AEROSOL WITH ADAPTER (GRAM) INHALATION EVERY 6 HOURS
Qty: 0 | Refills: 0 | Status: DISCONTINUED | OUTPATIENT
Start: 2018-08-04 | End: 2018-08-07

## 2018-08-04 RX ORDER — INSULIN LISPRO 100/ML
6 VIAL (ML) SUBCUTANEOUS ONCE
Qty: 0 | Refills: 0 | Status: COMPLETED | OUTPATIENT
Start: 2018-08-04 | End: 2018-08-04

## 2018-08-04 RX ADMIN — Medication 1 TABLET(S): at 06:00

## 2018-08-04 RX ADMIN — Medication 3 MILLILITER(S): at 22:19

## 2018-08-04 RX ADMIN — Medication 500 MICROGRAM(S): at 09:36

## 2018-08-04 RX ADMIN — SODIUM CHLORIDE 4 MILLILITER(S): 9 INJECTION INTRAMUSCULAR; INTRAVENOUS; SUBCUTANEOUS at 09:36

## 2018-08-04 RX ADMIN — INSULIN GLARGINE 25 UNIT(S): 100 INJECTION, SOLUTION SUBCUTANEOUS at 21:45

## 2018-08-04 RX ADMIN — Medication 1 TABLET(S): at 18:19

## 2018-08-04 RX ADMIN — BUDESONIDE AND FORMOTEROL FUMARATE DIHYDRATE 2 PUFF(S): 160; 4.5 AEROSOL RESPIRATORY (INHALATION) at 09:37

## 2018-08-04 RX ADMIN — Medication 6 UNIT(S): at 12:49

## 2018-08-04 RX ADMIN — Medication 40 MILLIGRAM(S): at 06:00

## 2018-08-04 RX ADMIN — ENOXAPARIN SODIUM 40 MILLIGRAM(S): 100 INJECTION SUBCUTANEOUS at 09:10

## 2018-08-04 RX ADMIN — Medication 1 APPLICATORFUL: at 06:01

## 2018-08-04 RX ADMIN — Medication 4: at 18:18

## 2018-08-04 RX ADMIN — Medication 3 UNIT(S): at 18:18

## 2018-08-04 RX ADMIN — Medication 3 MILLILITER(S): at 16:14

## 2018-08-04 RX ADMIN — CHLORHEXIDINE GLUCONATE 1 APPLICATION(S): 213 SOLUTION TOPICAL at 21:44

## 2018-08-04 RX ADMIN — POLYETHYLENE GLYCOL 3350 17 GRAM(S): 17 POWDER, FOR SOLUTION ORAL at 06:00

## 2018-08-04 RX ADMIN — PANTOPRAZOLE SODIUM 40 MILLIGRAM(S): 20 TABLET, DELAYED RELEASE ORAL at 06:00

## 2018-08-04 RX ADMIN — Medication 1 TABLET(S): at 12:39

## 2018-08-04 RX ADMIN — Medication 1 APPLICATORFUL: at 18:22

## 2018-08-04 RX ADMIN — SODIUM CHLORIDE 4 MILLILITER(S): 9 INJECTION INTRAMUSCULAR; INTRAVENOUS; SUBCUTANEOUS at 22:19

## 2018-08-04 RX ADMIN — BUDESONIDE AND FORMOTEROL FUMARATE DIHYDRATE 2 PUFF(S): 160; 4.5 AEROSOL RESPIRATORY (INHALATION) at 22:32

## 2018-08-04 RX ADMIN — POLYETHYLENE GLYCOL 3350 17 GRAM(S): 17 POWDER, FOR SOLUTION ORAL at 18:19

## 2018-08-04 RX ADMIN — Medication 81 MILLIGRAM(S): at 12:39

## 2018-08-04 RX ADMIN — MONTELUKAST 10 MILLIGRAM(S): 4 TABLET, CHEWABLE ORAL at 21:45

## 2018-08-04 RX ADMIN — Medication 500 MICROGRAM(S): at 03:58

## 2018-08-04 RX ADMIN — SENNA PLUS 2 TABLET(S): 8.6 TABLET ORAL at 21:45

## 2018-08-04 NOTE — PROGRESS NOTE ADULT - PROBLEM SELECTOR PLAN 3
Eosinophilia while on oral steroids. May suggest a role for biologic therapy. Change albuterol to Duoneb standing dose ATC.

## 2018-08-04 NOTE — PROGRESS NOTE ADULT - CONSTITUTIONAL DETAILS
well-developed/well-groomed/no distress/well-nourished
well-groomed/well-nourished/no distress
well-nourished/well-groomed

## 2018-08-04 NOTE — PROGRESS NOTE ADULT - ATTENDING COMMENTS
As above  ABPA associated bronchiectasis consistent with exacerbation. (questionable tracheomalacia) Still with significant disease.  Solumedrol restarted at 20mg Q6h and standing bronchodilator therapy.  Outpatient to consider biologic therapy (IL-5 drugs and dynamic CT)  Aggressive chest PT and pulmonary toilet.  Change NEBs to hyperSAL   Will need long steroid taper  c/w Batrim for Stenotrophomonas and will need PCP prophylaxis following  Will need O/P F/U pulm on D/C.    Raul Mendez MD-Pulmonary   [phone]

## 2018-08-04 NOTE — PROGRESS NOTE ADULT - PROBLEM SELECTOR PLAN 7
Chronic. Can be 2/2 yeast infection given frequent steroid use vs. vaginal atrophy.  - Will do clotrimazole cream  UA neg Chronic. Can be 2/2 yeast infection given frequent steroid use vs. vaginal atrophy.  continue clotrimazole cream  UA neg

## 2018-08-04 NOTE — PROGRESS NOTE ADULT - PROBLEM SELECTOR PLAN 2
- Likely 2/2 acute exacerbation vs. progression of bronchiectasis vs. BRENTON.  - RVP negative.  - CT chest noncontrast reviewed, continue current treatment  -restart IV solumedrol 20mg Q6h  - c/w Bactrim for PCP ppx  - oxygen supplement as needed

## 2018-08-04 NOTE — PROGRESS NOTE ADULT - SUBJECTIVE AND OBJECTIVE BOX
CHIEF COMPLAINT:    Interval Events:    REVIEW OF SYSTEMS:  Constitutional:   Eyes:  ENT:  CV:  Resp:  GI:  :  MSK:  Integumentary:  Neurological:  Psychiatric:  Endocrine:  Hematologic/Lymphatic:  Allergic/Immunologic:  [ ] All other systems negative  [ ] Unable to assess ROS because ________    OBJECTIVE:  ICU Vital Signs Last 24 Hrs  T(C): 36.8 (04 Aug 2018 05:34), Max: 37.3 (03 Aug 2018 14:00)  T(F): 98.2 (04 Aug 2018 05:34), Max: 99.2 (03 Aug 2018 14:00)  HR: 96 (04 Aug 2018 05:34) (74 - 96)  BP: 133/78 (04 Aug 2018 05:34) (119/64 - 133/78)  BP(mean): --  ABP: --  ABP(mean): --  RR: 22 (04 Aug 2018 05:34) (19 - 119)  SpO2: 96% (04 Aug 2018 05:34) (96% - 98%)        08-03 @ 07:01  -  08-04 @ 07:00  --------------------------------------------------------  IN: 840 mL / OUT: 800 mL / NET: 40 mL      CAPILLARY BLOOD GLUCOSE      POCT Blood Glucose.: 134 mg/dL (03 Aug 2018 21:54)      PHYSICAL EXAM:  General:   HEENT:   Lymph Nodes:  Neck:   Respiratory:   Cardiovascular:   Abdomen:   Extremities:   Skin:   Neurological:  Psychiatry:    HOSPITAL MEDICATIONS:  MEDICATIONS  (STANDING):  aspirin  chewable 81 milliGRAM(s) Oral daily  buDESOnide  80 MICROgram(s)/formoterol 4.5 MICROgram(s) Inhaler 2 Puff(s) Inhalation two times a day  chlorhexidine 4% Liquid 1 Application(s) Topical <User Schedule>  clotrimazole 2% Vaginal Cream 1 Applicatorful Vaginal two times a day  dextrose 5%. 1000 milliLiter(s) (50 mL/Hr) IV Continuous <Continuous>  dextrose 50% Injectable 12.5 Gram(s) IV Push once  dextrose 50% Injectable 25 Gram(s) IV Push once  dextrose 50% Injectable 25 Gram(s) IV Push once  enoxaparin Injectable 40 milliGRAM(s) SubCutaneous every 24 hours  insulin glargine Injectable (LANTUS) 25 Unit(s) SubCutaneous at bedtime  insulin lispro (HumaLOG) corrective regimen sliding scale   SubCutaneous three times a day before meals  insulin lispro (HumaLOG) corrective regimen sliding scale   SubCutaneous at bedtime  ipratropium    for Nebulization 500 MICROGram(s) Nebulizer every 6 hours  montelukast 10 milliGRAM(s) Oral at bedtime  multivitamin 1 Tablet(s) Oral daily  pantoprazole    Tablet 40 milliGRAM(s) Oral before breakfast  polyethylene glycol 3350 17 Gram(s) Oral two times a day  predniSONE   Tablet 40 milliGRAM(s) Oral daily  senna 2 Tablet(s) Oral at bedtime  sodium chloride 3%  Inhalation 4 milliLiter(s) Inhalation two times a day  trimethoprim  160 mG/sulfamethoxazole 800 mG 1 Tablet(s) Oral two times a day    MEDICATIONS  (PRN):  ALBUTerol    0.083% 2.5 milliGRAM(s) Nebulizer every 3 hours PRN Shortness of Breath and/or Wheezing  bisacodyl Suppository 10 milliGRAM(s) Rectal daily PRN Constipation  dextrose 40% Gel 15 Gram(s) Oral once PRN Blood Glucose LESS THAN 70 milliGRAM(s)/deciliter  glucagon  Injectable 1 milliGRAM(s) IntraMuscular once PRN Glucose LESS THAN 70 milligrams/deciliter  guaiFENesin    Syrup 100 milliGRAM(s) Oral every 6 hours PRN Cough      LABS:                        10.8   9.51  )-----------( 207      ( 04 Aug 2018 05:20 )             35.1     08-04    136  |  98  |  23  ----------------------------<  124<H>  4.2   |  25  |  0.67    Ca    9.2      04 Aug 2018 05:20                MICROBIOLOGY:     RADIOLOGY:  [ ] Reviewed and interpreted by me    PULMONARY FUNCTION TESTS:    EKG: CHIEF COMPLAINT: Patient is a 63y old  Female who presents with a chief complaint of Cough, SOB (03 Aug 2018 19:29)    Interval Events: No acute events overnight      OBJECTIVE:  ICU Vital Signs Last 24 Hrs  T(C): 36.8 (04 Aug 2018 05:34), Max: 37.3 (03 Aug 2018 14:00)  T(F): 98.2 (04 Aug 2018 05:34), Max: 99.2 (03 Aug 2018 14:00)  HR: 96 (04 Aug 2018 05:34) (74 - 96)  BP: 133/78 (04 Aug 2018 05:34) (119/64 - 133/78)  BP(mean): --  ABP: --  ABP(mean): --  RR: 22 (04 Aug 2018 05:34) (19 - 119)  SpO2: 96% (04 Aug 2018 05:34) (96% - 98%)        08-03 @ 07:01  -  08-04 @ 07:00  --------------------------------------------------------  IN: 840 mL / OUT: 800 mL / NET: 40 mL      CAPILLARY BLOOD GLUCOSE      POCT Blood Glucose.: 134 mg/dL (03 Aug 2018 21:54)        HOSPITAL MEDICATIONS:  MEDICATIONS  (STANDING):  aspirin  chewable 81 milliGRAM(s) Oral daily  buDESOnide  80 MICROgram(s)/formoterol 4.5 MICROgram(s) Inhaler 2 Puff(s) Inhalation two times a day  chlorhexidine 4% Liquid 1 Application(s) Topical <User Schedule>  clotrimazole 2% Vaginal Cream 1 Applicatorful Vaginal two times a day  dextrose 5%. 1000 milliLiter(s) (50 mL/Hr) IV Continuous <Continuous>  dextrose 50% Injectable 12.5 Gram(s) IV Push once  dextrose 50% Injectable 25 Gram(s) IV Push once  dextrose 50% Injectable 25 Gram(s) IV Push once  enoxaparin Injectable 40 milliGRAM(s) SubCutaneous every 24 hours  insulin glargine Injectable (LANTUS) 25 Unit(s) SubCutaneous at bedtime  insulin lispro (HumaLOG) corrective regimen sliding scale   SubCutaneous three times a day before meals  insulin lispro (HumaLOG) corrective regimen sliding scale   SubCutaneous at bedtime  ipratropium    for Nebulization 500 MICROGram(s) Nebulizer every 6 hours  montelukast 10 milliGRAM(s) Oral at bedtime  multivitamin 1 Tablet(s) Oral daily  pantoprazole    Tablet 40 milliGRAM(s) Oral before breakfast  polyethylene glycol 3350 17 Gram(s) Oral two times a day  predniSONE   Tablet 40 milliGRAM(s) Oral daily  senna 2 Tablet(s) Oral at bedtime  sodium chloride 3%  Inhalation 4 milliLiter(s) Inhalation two times a day  trimethoprim  160 mG/sulfamethoxazole 800 mG 1 Tablet(s) Oral two times a day    MEDICATIONS  (PRN):  ALBUTerol    0.083% 2.5 milliGRAM(s) Nebulizer every 3 hours PRN Shortness of Breath and/or Wheezing  bisacodyl Suppository 10 milliGRAM(s) Rectal daily PRN Constipation  dextrose 40% Gel 15 Gram(s) Oral once PRN Blood Glucose LESS THAN 70 milliGRAM(s)/deciliter  glucagon  Injectable 1 milliGRAM(s) IntraMuscular once PRN Glucose LESS THAN 70 milligrams/deciliter  guaiFENesin    Syrup 100 milliGRAM(s) Oral every 6 hours PRN Cough      LABS:                        10.8   9.51  )-----------( 207      ( 04 Aug 2018 05:20 )             35.1     08-04    136  |  98  |  23  ----------------------------<  124<H>  4.2   |  25  |  0.67    Ca    9.2      04 Aug 2018 05:20                MICROBIOLOGY:     RADIOLOGY:  [ ] Reviewed and interpreted by me    PULMONARY FUNCTION TESTS:    EKG:

## 2018-08-04 NOTE — PROGRESS NOTE ADULT - NS NEC GEN PE MLT EXAM PC
No bruits; no thyromegaly or nodules
No bruits; no thyromegaly or nodules
detailed exam
detailed exam

## 2018-08-05 LAB
ALBUMIN SERPL ELPH-MCNC: 3.8 G/DL — SIGNIFICANT CHANGE UP (ref 3.3–5)
ALP SERPL-CCNC: 132 U/L — HIGH (ref 40–120)
ALT FLD-CCNC: 45 U/L — HIGH (ref 4–33)
AST SERPL-CCNC: 37 U/L — HIGH (ref 4–32)
BASOPHILS # BLD AUTO: 0.05 K/UL — SIGNIFICANT CHANGE UP (ref 0–0.2)
BASOPHILS NFR BLD AUTO: 0.5 % — SIGNIFICANT CHANGE UP (ref 0–2)
BILIRUB SERPL-MCNC: < 0.2 MG/DL — LOW (ref 0.2–1.2)
BUN SERPL-MCNC: 22 MG/DL — SIGNIFICANT CHANGE UP (ref 7–23)
CALCIUM SERPL-MCNC: 9.1 MG/DL — SIGNIFICANT CHANGE UP (ref 8.4–10.5)
CHLORIDE SERPL-SCNC: 98 MMOL/L — SIGNIFICANT CHANGE UP (ref 98–107)
CO2 SERPL-SCNC: 25 MMOL/L — SIGNIFICANT CHANGE UP (ref 22–31)
CREAT SERPL-MCNC: 0.64 MG/DL — SIGNIFICANT CHANGE UP (ref 0.5–1.3)
EOSINOPHIL # BLD AUTO: 0.17 K/UL — SIGNIFICANT CHANGE UP (ref 0–0.5)
EOSINOPHIL NFR BLD AUTO: 1.7 % — SIGNIFICANT CHANGE UP (ref 0–6)
GLUCOSE BLDC GLUCOMTR-MCNC: 194 MG/DL — HIGH (ref 70–99)
GLUCOSE BLDC GLUCOMTR-MCNC: 233 MG/DL — HIGH (ref 70–99)
GLUCOSE BLDC GLUCOMTR-MCNC: 269 MG/DL — HIGH (ref 70–99)
GLUCOSE BLDC GLUCOMTR-MCNC: 316 MG/DL — HIGH (ref 70–99)
GLUCOSE SERPL-MCNC: 312 MG/DL — HIGH (ref 70–99)
HCT VFR BLD CALC: 34.1 % — LOW (ref 34.5–45)
HGB BLD-MCNC: 10.6 G/DL — LOW (ref 11.5–15.5)
IMM GRANULOCYTES # BLD AUTO: 0.08 # — SIGNIFICANT CHANGE UP
IMM GRANULOCYTES NFR BLD AUTO: 0.8 % — SIGNIFICANT CHANGE UP (ref 0–1.5)
LYMPHOCYTES # BLD AUTO: 1.95 K/UL — SIGNIFICANT CHANGE UP (ref 1–3.3)
LYMPHOCYTES # BLD AUTO: 19.9 % — SIGNIFICANT CHANGE UP (ref 13–44)
MCHC RBC-ENTMCNC: 22.7 PG — LOW (ref 27–34)
MCHC RBC-ENTMCNC: 31.1 % — LOW (ref 32–36)
MCV RBC AUTO: 73 FL — LOW (ref 80–100)
MONOCYTES # BLD AUTO: 0.43 K/UL — SIGNIFICANT CHANGE UP (ref 0–0.9)
MONOCYTES NFR BLD AUTO: 4.4 % — SIGNIFICANT CHANGE UP (ref 2–14)
NEUTROPHILS # BLD AUTO: 7.11 K/UL — SIGNIFICANT CHANGE UP (ref 1.8–7.4)
NEUTROPHILS NFR BLD AUTO: 72.7 % — SIGNIFICANT CHANGE UP (ref 43–77)
NRBC # FLD: 0 — SIGNIFICANT CHANGE UP
PLATELET # BLD AUTO: 209 K/UL — SIGNIFICANT CHANGE UP (ref 150–400)
PMV BLD: 11.4 FL — SIGNIFICANT CHANGE UP (ref 7–13)
POTASSIUM SERPL-MCNC: 4.1 MMOL/L — SIGNIFICANT CHANGE UP (ref 3.5–5.3)
POTASSIUM SERPL-SCNC: 4.1 MMOL/L — SIGNIFICANT CHANGE UP (ref 3.5–5.3)
PROT SERPL-MCNC: 6.8 G/DL — SIGNIFICANT CHANGE UP (ref 6–8.3)
RBC # BLD: 4.67 M/UL — SIGNIFICANT CHANGE UP (ref 3.8–5.2)
RBC # FLD: 17.6 % — HIGH (ref 10.3–14.5)
SODIUM SERPL-SCNC: 134 MMOL/L — LOW (ref 135–145)
WBC # BLD: 9.79 K/UL — SIGNIFICANT CHANGE UP (ref 3.8–10.5)
WBC # FLD AUTO: 9.79 K/UL — SIGNIFICANT CHANGE UP (ref 3.8–10.5)

## 2018-08-05 PROCEDURE — 99232 SBSQ HOSP IP/OBS MODERATE 35: CPT

## 2018-08-05 RX ORDER — LANOLIN ALCOHOL/MO/W.PET/CERES
3 CREAM (GRAM) TOPICAL AT BEDTIME
Qty: 0 | Refills: 0 | Status: DISCONTINUED | OUTPATIENT
Start: 2018-08-05 | End: 2018-08-07

## 2018-08-05 RX ORDER — LANOLIN ALCOHOL/MO/W.PET/CERES
3 CREAM (GRAM) TOPICAL ONCE
Qty: 0 | Refills: 0 | Status: COMPLETED | OUTPATIENT
Start: 2018-08-05 | End: 2018-08-05

## 2018-08-05 RX ADMIN — Medication 2: at 18:29

## 2018-08-05 RX ADMIN — Medication 40 MILLIGRAM(S): at 05:52

## 2018-08-05 RX ADMIN — Medication 81 MILLIGRAM(S): at 11:33

## 2018-08-05 RX ADMIN — ENOXAPARIN SODIUM 40 MILLIGRAM(S): 100 INJECTION SUBCUTANEOUS at 09:23

## 2018-08-05 RX ADMIN — Medication 1 APPLICATORFUL: at 05:52

## 2018-08-05 RX ADMIN — INSULIN GLARGINE 25 UNIT(S): 100 INJECTION, SOLUTION SUBCUTANEOUS at 21:52

## 2018-08-05 RX ADMIN — Medication 1 TABLET(S): at 11:34

## 2018-08-05 RX ADMIN — Medication 3 MILLILITER(S): at 03:42

## 2018-08-05 RX ADMIN — Medication 20 MILLIGRAM(S): at 17:26

## 2018-08-05 RX ADMIN — Medication 4: at 13:42

## 2018-08-05 RX ADMIN — SENNA PLUS 2 TABLET(S): 8.6 TABLET ORAL at 21:52

## 2018-08-05 RX ADMIN — Medication 3 UNIT(S): at 09:23

## 2018-08-05 RX ADMIN — CHLORHEXIDINE GLUCONATE 1 APPLICATION(S): 213 SOLUTION TOPICAL at 21:13

## 2018-08-05 RX ADMIN — BUDESONIDE AND FORMOTEROL FUMARATE DIHYDRATE 2 PUFF(S): 160; 4.5 AEROSOL RESPIRATORY (INHALATION) at 09:03

## 2018-08-05 RX ADMIN — Medication 1 TABLET(S): at 17:26

## 2018-08-05 RX ADMIN — Medication 3: at 09:23

## 2018-08-05 RX ADMIN — Medication 3 UNIT(S): at 18:29

## 2018-08-05 RX ADMIN — Medication 3 MILLIGRAM(S): at 02:30

## 2018-08-05 RX ADMIN — SODIUM CHLORIDE 4 MILLILITER(S): 9 INJECTION INTRAMUSCULAR; INTRAVENOUS; SUBCUTANEOUS at 22:40

## 2018-08-05 RX ADMIN — Medication 3 MILLILITER(S): at 22:32

## 2018-08-05 RX ADMIN — Medication 1 TABLET(S): at 05:52

## 2018-08-05 RX ADMIN — BUDESONIDE AND FORMOTEROL FUMARATE DIHYDRATE 2 PUFF(S): 160; 4.5 AEROSOL RESPIRATORY (INHALATION) at 22:52

## 2018-08-05 RX ADMIN — MONTELUKAST 10 MILLIGRAM(S): 4 TABLET, CHEWABLE ORAL at 21:52

## 2018-08-05 RX ADMIN — SODIUM CHLORIDE 4 MILLILITER(S): 9 INJECTION INTRAMUSCULAR; INTRAVENOUS; SUBCUTANEOUS at 09:02

## 2018-08-05 RX ADMIN — Medication 3 MILLILITER(S): at 16:04

## 2018-08-05 RX ADMIN — Medication 1 APPLICATORFUL: at 17:26

## 2018-08-05 RX ADMIN — PANTOPRAZOLE SODIUM 40 MILLIGRAM(S): 20 TABLET, DELAYED RELEASE ORAL at 05:53

## 2018-08-05 RX ADMIN — POLYETHYLENE GLYCOL 3350 17 GRAM(S): 17 POWDER, FOR SOLUTION ORAL at 05:53

## 2018-08-05 RX ADMIN — POLYETHYLENE GLYCOL 3350 17 GRAM(S): 17 POWDER, FOR SOLUTION ORAL at 17:26

## 2018-08-05 RX ADMIN — Medication 3 UNIT(S): at 13:43

## 2018-08-05 RX ADMIN — Medication 3 MILLILITER(S): at 09:02

## 2018-08-05 NOTE — PROGRESS NOTE ADULT - ASSESSMENT
64 yo woman with history of allergic bronchopulmonary aspergillosis (s/p Itraconazole in 2015), diffuse cystic bronchiectasis with Stenotrophomonas colonization, RUL thin-walled cavitary lesion, HTN, and DM2 presents with worsening cough productive of thick, yellow, nonbloody sputum for the past 1 month, admitted for possible bronchiectasis exacerbation vs. progression and r/o active TB vs. BR[place]. 64 yo woman with history of allergic bronchopulmonary aspergillosis (s/p Itraconazole in 2015), diffuse cystic bronchiectasis with Stenotrophomonas colonization, RUL thin-walled cavitary lesion, HTN, and DM2 presents with worsening cough productive of thick, yellow, nonbloody sputum for the past 1 month, admitted for possible bronchiectasis exacerbation vs. progression and r/o active TB vs. [place].    8/5 Improved both clinically continine on present medication solumedrol 20mg q 6h

## 2018-08-05 NOTE — PROGRESS NOTE ADULT - PROBLEM SELECTOR PLAN 1
- Likely 2/2 acute exacerbation vs. progression of bronchiectasis vs. BR[place]ON.  - RVP negative.  - CT chest noncontrast reviewed, continue current treatment  - c/w IV steroids, slow prednisone taper on d/c  - c/w Bactrim for PCP ppx  - oxygen supplement as needed  - Aggressive chest PT and pulmonary toilet - Likely 2/2 acute exacerbation vs. progression of bronchiectasis vs. BR[place]ON.  - RVP negative.  - CT chest noncontrast reviewed, continue current treatment  - c/w IV steroids (solumedrol 20mg q 6h), , slow prednisone taper on d/c  - c/w Bactrim for PCP ppx  - oxygen supplement as needed  - Aggressive chest PT and pulmonary toilet

## 2018-08-05 NOTE — PROGRESS NOTE ADULT - SUBJECTIVE AND OBJECTIVE BOX
CHIEF COMPLAINT:    Interval Events:    REVIEW OF SYSTEMS:  Constitutional:   Eyes:  ENT:  CV:  Resp:  GI:  :  MSK:  Integumentary:  Neurological:  Psychiatric:  Endocrine:  Hematologic/Lymphatic:  Allergic/Immunologic:  [ ] All other systems negative  [ ] Unable to assess ROS because ________    OBJECTIVE:  ICU Vital Signs Last 24 Hrs  T(C): 36.7 (05 Aug 2018 05:49), Max: 36.7 (05 Aug 2018 05:49)  T(F): 98.1 (05 Aug 2018 05:49), Max: 98.1 (05 Aug 2018 05:49)  HR: 87 (05 Aug 2018 05:49) (68 - 93)  BP: 114/69 (05 Aug 2018 05:49) (114/69 - 134/75)  BP(mean): --  ABP: --  ABP(mean): --  RR: 16 (05 Aug 2018 05:49) (16 - 22)  SpO2: 96% (05 Aug 2018 05:49) (96% - 100%)        08-04 @ 07:01  -  08-05 @ 07:00  --------------------------------------------------------  IN: 500 mL / OUT: 900 mL / NET: -400 mL      CAPILLARY BLOOD GLUCOSE      POCT Blood Glucose.: 95 mg/dL (04 Aug 2018 21:32)      PHYSICAL EXAM:  General:   HEENT:   Lymph Nodes:  Neck:   Respiratory:   Cardiovascular:   Abdomen:   Extremities:   Skin:   Neurological:  Psychiatry:    HOSPITAL MEDICATIONS:  MEDICATIONS  (STANDING):  ALBUTerol/ipratropium for Nebulization 3 milliLiter(s) Nebulizer every 6 hours  aspirin  chewable 81 milliGRAM(s) Oral daily  buDESOnide  80 MICROgram(s)/formoterol 4.5 MICROgram(s) Inhaler 2 Puff(s) Inhalation two times a day  chlorhexidine 4% Liquid 1 Application(s) Topical <User Schedule>  clotrimazole 2% Vaginal Cream 1 Applicatorful Vaginal two times a day  dextrose 5%. 1000 milliLiter(s) (50 mL/Hr) IV Continuous <Continuous>  dextrose 50% Injectable 12.5 Gram(s) IV Push once  dextrose 50% Injectable 25 Gram(s) IV Push once  dextrose 50% Injectable 25 Gram(s) IV Push once  enoxaparin Injectable 40 milliGRAM(s) SubCutaneous every 24 hours  insulin glargine Injectable (LANTUS) 25 Unit(s) SubCutaneous at bedtime  insulin lispro (HumaLOG) corrective regimen sliding scale   SubCutaneous three times a day before meals  insulin lispro (HumaLOG) corrective regimen sliding scale   SubCutaneous at bedtime  insulin lispro Injectable (HumaLOG) 3 Unit(s) SubCutaneous three times a day before meals  montelukast 10 milliGRAM(s) Oral at bedtime  multivitamin 1 Tablet(s) Oral daily  pantoprazole    Tablet 40 milliGRAM(s) Oral before breakfast  polyethylene glycol 3350 17 Gram(s) Oral two times a day  predniSONE   Tablet 40 milliGRAM(s) Oral daily  senna 2 Tablet(s) Oral at bedtime  sodium chloride 3%  Inhalation 4 milliLiter(s) Inhalation two times a day  trimethoprim  160 mG/sulfamethoxazole 800 mG 1 Tablet(s) Oral two times a day    MEDICATIONS  (PRN):  bisacodyl Suppository 10 milliGRAM(s) Rectal daily PRN Constipation  dextrose 40% Gel 15 Gram(s) Oral once PRN Blood Glucose LESS THAN 70 milliGRAM(s)/deciliter  glucagon  Injectable 1 milliGRAM(s) IntraMuscular once PRN Glucose LESS THAN 70 milligrams/deciliter  guaiFENesin    Syrup 100 milliGRAM(s) Oral every 6 hours PRN Cough  hydrocortisone hemorrhoidal Suppository 1 Suppository(s) Rectal two times a day PRN rectal pain  melatonin 3 milliGRAM(s) Oral at bedtime PRN Insomnia      LABS:                        10.8   9.51  )-----------( 207      ( 04 Aug 2018 05:20 )             35.1     08-04    136  |  98  |  23  ----------------------------<  124<H>  4.2   |  25  |  0.67    Ca    9.2      04 Aug 2018 05:20                MICROBIOLOGY:     RADIOLOGY:  [ ] Reviewed and interpreted by me    PULMONARY FUNCTION TESTS:    EKG: CHIEF COMPLAINT:Patient is a 63y old  Female who presents with a chief complaint of Cough, SOB (03 Aug 2018 19:29)      Interval Events: None     REVIEW OF SYSTEMS:  Constitutional: Feels ok   Eyes:N  ENT:N  CV:N  Resp:N  GI:N  :N  MSK: N  Integumentary:N  Neurological:N  Psychiatric:N  Endocrine:N  Hematologic/Lymphatic:N  Allergic/Immunologic:N  [ ] All other systems negative  [ ] Unable to assess ROS because ________    OBJECTIVE:  ICU Vital Signs Last 24 Hrs  T(C): 36.7 (05 Aug 2018 05:49), Max: 36.7 (05 Aug 2018 05:49)  T(F): 98.1 (05 Aug 2018 05:49), Max: 98.1 (05 Aug 2018 05:49)  HR: 87 (05 Aug 2018 05:49) (68 - 93)  BP: 114/69 (05 Aug 2018 05:49) (114/69 - 134/75)  BP(mean): --  ABP: --  ABP(mean): --  RR: 16 (05 Aug 2018 05:49) (16 - 22)  SpO2: 96% (05 Aug 2018 05:49) (96% - 100%)        08-04 @ 07:01  -  08-05 @ 07:00  --------------------------------------------------------  IN: 500 mL / OUT: 900 mL / NET: -400 mL      CAPILLARY BLOOD GLUCOSE      POCT Blood Glucose.: 95 mg/dL (04 Aug 2018 21:32)      PHYSICAL EXAM:  General:   HEENT: NC AT  MM moist   Lymph Nodes:  Neck:   Respiratory:   Cardiovascular:   Abdomen:   Extremities:   Skin:   Neurological:  Psychiatry:    HOSPITAL MEDICATIONS:  MEDICATIONS  (STANDING):  ALBUTerol/ipratropium for Nebulization 3 milliLiter(s) Nebulizer every 6 hours  aspirin  chewable 81 milliGRAM(s) Oral daily  buDESOnide  80 MICROgram(s)/formoterol 4.5 MICROgram(s) Inhaler 2 Puff(s) Inhalation two times a day  chlorhexidine 4% Liquid 1 Application(s) Topical <User Schedule>  clotrimazole 2% Vaginal Cream 1 Applicatorful Vaginal two times a day  dextrose 5%. 1000 milliLiter(s) (50 mL/Hr) IV Continuous <Continuous>  dextrose 50% Injectable 12.5 Gram(s) IV Push once  dextrose 50% Injectable 25 Gram(s) IV Push once  dextrose 50% Injectable 25 Gram(s) IV Push once  enoxaparin Injectable 40 milliGRAM(s) SubCutaneous every 24 hours  insulin glargine Injectable (LANTUS) 25 Unit(s) SubCutaneous at bedtime  insulin lispro (HumaLOG) corrective regimen sliding scale   SubCutaneous three times a day before meals  insulin lispro (HumaLOG) corrective regimen sliding scale   SubCutaneous at bedtime  insulin lispro Injectable (HumaLOG) 3 Unit(s) SubCutaneous three times a day before meals  montelukast 10 milliGRAM(s) Oral at bedtime  multivitamin 1 Tablet(s) Oral daily  pantoprazole    Tablet 40 milliGRAM(s) Oral before breakfast  polyethylene glycol 3350 17 Gram(s) Oral two times a day  predniSONE   Tablet 40 milliGRAM(s) Oral daily  senna 2 Tablet(s) Oral at bedtime  sodium chloride 3%  Inhalation 4 milliLiter(s) Inhalation two times a day  trimethoprim  160 mG/sulfamethoxazole 800 mG 1 Tablet(s) Oral two times a day    MEDICATIONS  (PRN):  bisacodyl Suppository 10 milliGRAM(s) Rectal daily PRN Constipation  dextrose 40% Gel 15 Gram(s) Oral once PRN Blood Glucose LESS THAN 70 milliGRAM(s)/deciliter  glucagon  Injectable 1 milliGRAM(s) IntraMuscular once PRN Glucose LESS THAN 70 milligrams/deciliter  guaiFENesin    Syrup 100 milliGRAM(s) Oral every 6 hours PRN Cough  hydrocortisone hemorrhoidal Suppository 1 Suppository(s) Rectal two times a day PRN rectal pain  melatonin 3 milliGRAM(s) Oral at bedtime PRN Insomnia      LABS:                        10.8   9.51  )-----------( 207      ( 04 Aug 2018 05:20 )             35.1     08-04    136  |  98  |  23  ----------------------------<  124<H>  4.2   |  25  |  0.67    Ca    9.2      04 Aug 2018 05:20                MICROBIOLOGY:     RADIOLOGY:  [ ] Reviewed and interpreted by me    PULMONARY FUNCTION TESTS:    EKG: CHIEF COMPLAINT:Patient is a 63y old  Female who presents with a chief complaint of Cough, SOB (03 Aug 2018 19:29)      Interval Events: None     REVIEW OF SYSTEMS:  Constitutional: Feels ok   Eyes:N  ENT:N  CV:N  Resp:N  GI:N  :N  MSK: N  Integumentary:N  Neurological:N  Psychiatric:N  Endocrine:N  Hematologic/Lymphatic:N  Allergic/Immunologic:N  [ ] All other systems negative  [ ] Unable to assess ROS because ________    OBJECTIVE:  ICU Vital Signs Last 24 Hrs  T(C): 36.7 (05 Aug 2018 05:49), Max: 36.7 (05 Aug 2018 05:49)  T(F): 98.1 (05 Aug 2018 05:49), Max: 98.1 (05 Aug 2018 05:49)  HR: 87 (05 Aug 2018 05:49) (68 - 93)  BP: 114/69 (05 Aug 2018 05:49) (114/69 - 134/75)  BP(mean): --  ABP: --  ABP(mean): --  RR: 16 (05 Aug 2018 05:49) (16 - 22)  SpO2: 96% (05 Aug 2018 05:49) (96% - 100%)        08-04 @ 07:01  -  08-05 @ 07:00  --------------------------------------------------------  IN: 500 mL / OUT: 900 mL / NET: -400 mL      CAPILLARY BLOOD GLUCOSE      POCT Blood Glucose.: 95 mg/dL (04 Aug 2018 21:32)      PHYSICAL EXAM:  General: resting comfortably in stretcher, NAD  HEENT: NC AT  MM moist   Lymph Nodes: No LAD  Neck: supple  Respiratory: b/l + wheezes  Cardiovascular: S1S2  Abdomen: sft, nt, nd, NABS  Extremities: moving all extremities,  no edema  Skin: normal  Neurological: non focal  Psychiatry: normal    HOSPITAL MEDICATIONS:  MEDICATIONS  (STANDING):  ALBUTerol/ipratropium for Nebulization 3 milliLiter(s) Nebulizer every 6 hours  aspirin  chewable 81 milliGRAM(s) Oral daily  buDESOnide  80 MICROgram(s)/formoterol 4.5 MICROgram(s) Inhaler 2 Puff(s) Inhalation two times a day  chlorhexidine 4% Liquid 1 Application(s) Topical <User Schedule>  clotrimazole 2% Vaginal Cream 1 Applicatorful Vaginal two times a day  dextrose 5%. 1000 milliLiter(s) (50 mL/Hr) IV Continuous <Continuous>  dextrose 50% Injectable 12.5 Gram(s) IV Push once  dextrose 50% Injectable 25 Gram(s) IV Push once  dextrose 50% Injectable 25 Gram(s) IV Push once  enoxaparin Injectable 40 milliGRAM(s) SubCutaneous every 24 hours  insulin glargine Injectable (LANTUS) 25 Unit(s) SubCutaneous at bedtime  insulin lispro (HumaLOG) corrective regimen sliding scale   SubCutaneous three times a day before meals  insulin lispro (HumaLOG) corrective regimen sliding scale   SubCutaneous at bedtime  insulin lispro Injectable (HumaLOG) 3 Unit(s) SubCutaneous three times a day before meals  montelukast 10 milliGRAM(s) Oral at bedtime  multivitamin 1 Tablet(s) Oral daily  pantoprazole    Tablet 40 milliGRAM(s) Oral before breakfast  polyethylene glycol 3350 17 Gram(s) Oral two times a day  predniSONE   Tablet 40 milliGRAM(s) Oral daily  senna 2 Tablet(s) Oral at bedtime  sodium chloride 3%  Inhalation 4 milliLiter(s) Inhalation two times a day  trimethoprim  160 mG/sulfamethoxazole 800 mG 1 Tablet(s) Oral two times a day    MEDICATIONS  (PRN):  bisacodyl Suppository 10 milliGRAM(s) Rectal daily PRN Constipation  dextrose 40% Gel 15 Gram(s) Oral once PRN Blood Glucose LESS THAN 70 milliGRAM(s)/deciliter  glucagon  Injectable 1 milliGRAM(s) IntraMuscular once PRN Glucose LESS THAN 70 milligrams/deciliter  guaiFENesin    Syrup 100 milliGRAM(s) Oral every 6 hours PRN Cough  hydrocortisone hemorrhoidal Suppository 1 Suppository(s) Rectal two times a day PRN rectal pain  melatonin 3 milliGRAM(s) Oral at bedtime PRN Insomnia      LABS:                        10.8   9.51  )-----------( 207      ( 04 Aug 2018 05:20 )             35.1     08-04    136  |  98  |  23  ----------------------------<  124<H>  4.2   |  25  |  0.67    Ca    9.2      04 Aug 2018 05:20                MICROBIOLOGY:     RADIOLOGY:  [ ] Reviewed and interpreted by me    PULMONARY FUNCTION TESTS:    EKG:

## 2018-08-05 NOTE — PROGRESS NOTE ADULT - ATTENDING COMMENTS
As above  ABPA associated bronchiectasis consistent with exacerbation. (questionable tracheomalacia) Still with significant disease.  Solumedrol restarted at 20mg Q6h and standing bronchodilator therapy.  Outpatient to consider biologic therapy (IL-5 drugs and dynamic CT)  Aggressive chest PT and pulmonary toilet.  Change NEBs to hyperSAL   Will need long steroid taper  c/w Batrim for Stenotrophomonas and will need PCP prophylaxis following  Will need O/P F/U pulm on D/C.    Raul Mendez MD-Pulmonary   [phone] As above  ABPA associated bronchiectasis consistent with exacerbation. (questionable tracheomalacia) Still with significant disease.  Solumedrol continued  at 20mg Q6h and standing bronchodilator therapy.  Outpatient to consider biologic therapy (IL-5 drugs and dynamic CT)  Aggressive chest PT and pulmonary toilet.  Changed NEBs to hyperSAL   Will need long steroid taper  c/w Batrim for Stenotrophomonas and will need PCP prophylaxis following  Will need O/P F/U pulm on D/C.    Raul Mendez MD-Pulmonary   [phone]

## 2018-08-05 NOTE — PROGRESS NOTE ADULT - PROBLEM SELECTOR PLAN 2
- Likely 2/2 acute exacerbation vs. progression of bronchiectasis vs. [place].  - RVP negative.  - CT chest noncontrast reviewed, continue current treatment  -restart IV solumedrol 20mg Q6h  - c/w Bactrim for PCP ppx  - oxygen supplement as needed - Likely 2/2 acute exacerbation vs. progression of bronchiectasis vs. [place].  - RVP negative.  - CT chest noncontrast reviewed, continue current treatment  -continue IV solumedrol 20mg Q6h  - c/w Bactrim for PCP ppx  - oxygen supplement as needed

## 2018-08-05 NOTE — PROGRESS NOTE ADULT - PROBLEM SELECTOR PLAN 7
Chronic. Can be 2/2 yeast infection given frequent steroid use vs. vaginal atrophy.  continue clotrimazole cream  UA neg

## 2018-08-05 NOTE — PROGRESS NOTE ADULT - PROBLEM SELECTOR PLAN 3
Eosinophilia while on oral steroids. May suggest a role for biologic therapy. Change albuterol to Duoneb standing dose ATC. Eosinophilia while on oral steroids. May suggest a role for biologic therapy. Continue Duoneb standing dose ATC.

## 2018-08-06 DIAGNOSIS — R74.8 ABNORMAL LEVELS OF OTHER SERUM ENZYMES: ICD-10-CM

## 2018-08-06 LAB
ALBUMIN SERPL ELPH-MCNC: 3.8 G/DL — SIGNIFICANT CHANGE UP (ref 3.3–5)
ALP SERPL-CCNC: 156 U/L — HIGH (ref 40–120)
ALT FLD-CCNC: 78 U/L — HIGH (ref 4–33)
AST SERPL-CCNC: 82 U/L — HIGH (ref 4–32)
BASOPHILS # BLD AUTO: 0.01 K/UL — SIGNIFICANT CHANGE UP (ref 0–0.2)
BASOPHILS NFR BLD AUTO: 0.1 % — SIGNIFICANT CHANGE UP (ref 0–2)
BILIRUB SERPL-MCNC: < 0.2 MG/DL — LOW (ref 0.2–1.2)
BUN SERPL-MCNC: 26 MG/DL — HIGH (ref 7–23)
CALCIUM SERPL-MCNC: 9.2 MG/DL — SIGNIFICANT CHANGE UP (ref 8.4–10.5)
CHLORIDE SERPL-SCNC: 98 MMOL/L — SIGNIFICANT CHANGE UP (ref 98–107)
CO2 SERPL-SCNC: 23 MMOL/L — SIGNIFICANT CHANGE UP (ref 22–31)
CREAT SERPL-MCNC: 0.73 MG/DL — SIGNIFICANT CHANGE UP (ref 0.5–1.3)
EOSINOPHIL # BLD AUTO: 0 K/UL — SIGNIFICANT CHANGE UP (ref 0–0.5)
EOSINOPHIL NFR BLD AUTO: 0 % — SIGNIFICANT CHANGE UP (ref 0–6)
GLUCOSE BLDC GLUCOMTR-MCNC: 191 MG/DL — HIGH (ref 70–99)
GLUCOSE BLDC GLUCOMTR-MCNC: 247 MG/DL — HIGH (ref 70–99)
GLUCOSE BLDC GLUCOMTR-MCNC: 260 MG/DL — HIGH (ref 70–99)
GLUCOSE BLDC GLUCOMTR-MCNC: 388 MG/DL — HIGH (ref 70–99)
GLUCOSE BLDC GLUCOMTR-MCNC: 428 MG/DL — HIGH (ref 70–99)
GLUCOSE SERPL-MCNC: 403 MG/DL — HIGH (ref 70–99)
HCT VFR BLD CALC: 33.7 % — LOW (ref 34.5–45)
HGB BLD-MCNC: 10.4 G/DL — LOW (ref 11.5–15.5)
IMM GRANULOCYTES # BLD AUTO: 0.07 # — SIGNIFICANT CHANGE UP
IMM GRANULOCYTES NFR BLD AUTO: 0.8 % — SIGNIFICANT CHANGE UP (ref 0–1.5)
LYMPHOCYTES # BLD AUTO: 0.9 K/UL — LOW (ref 1–3.3)
LYMPHOCYTES # BLD AUTO: 10.7 % — LOW (ref 13–44)
MCHC RBC-ENTMCNC: 22.6 PG — LOW (ref 27–34)
MCHC RBC-ENTMCNC: 30.9 % — LOW (ref 32–36)
MCV RBC AUTO: 73.3 FL — LOW (ref 80–100)
MONOCYTES # BLD AUTO: 0.07 K/UL — SIGNIFICANT CHANGE UP (ref 0–0.9)
MONOCYTES NFR BLD AUTO: 0.8 % — LOW (ref 2–14)
NEUTROPHILS # BLD AUTO: 7.33 K/UL — SIGNIFICANT CHANGE UP (ref 1.8–7.4)
NEUTROPHILS NFR BLD AUTO: 87.6 % — HIGH (ref 43–77)
NRBC # FLD: 0 — SIGNIFICANT CHANGE UP
PLATELET # BLD AUTO: 217 K/UL — SIGNIFICANT CHANGE UP (ref 150–400)
PMV BLD: 11.5 FL — SIGNIFICANT CHANGE UP (ref 7–13)
POTASSIUM SERPL-MCNC: 5.2 MMOL/L — SIGNIFICANT CHANGE UP (ref 3.5–5.3)
POTASSIUM SERPL-SCNC: 5.2 MMOL/L — SIGNIFICANT CHANGE UP (ref 3.5–5.3)
PROT SERPL-MCNC: 6.9 G/DL — SIGNIFICANT CHANGE UP (ref 6–8.3)
RBC # BLD: 4.6 M/UL — SIGNIFICANT CHANGE UP (ref 3.8–5.2)
RBC # FLD: 17.7 % — HIGH (ref 10.3–14.5)
SODIUM SERPL-SCNC: 132 MMOL/L — LOW (ref 135–145)
WBC # BLD: 8.38 K/UL — SIGNIFICANT CHANGE UP (ref 3.8–10.5)
WBC # FLD AUTO: 8.38 K/UL — SIGNIFICANT CHANGE UP (ref 3.8–10.5)

## 2018-08-06 PROCEDURE — 99233 SBSQ HOSP IP/OBS HIGH 50: CPT

## 2018-08-06 RX ORDER — SIMETHICONE 80 MG/1
80 TABLET, CHEWABLE ORAL ONCE
Qty: 0 | Refills: 0 | Status: COMPLETED | OUTPATIENT
Start: 2018-08-06 | End: 2018-08-06

## 2018-08-06 RX ADMIN — Medication 3 UNIT(S): at 18:24

## 2018-08-06 RX ADMIN — Medication 20 MILLIGRAM(S): at 00:09

## 2018-08-06 RX ADMIN — ENOXAPARIN SODIUM 40 MILLIGRAM(S): 100 INJECTION SUBCUTANEOUS at 09:24

## 2018-08-06 RX ADMIN — CHLORHEXIDINE GLUCONATE 1 APPLICATION(S): 213 SOLUTION TOPICAL at 22:29

## 2018-08-06 RX ADMIN — SODIUM CHLORIDE 4 MILLILITER(S): 9 INJECTION INTRAMUSCULAR; INTRAVENOUS; SUBCUTANEOUS at 10:21

## 2018-08-06 RX ADMIN — Medication 81 MILLIGRAM(S): at 12:43

## 2018-08-06 RX ADMIN — Medication 2: at 18:24

## 2018-08-06 RX ADMIN — INSULIN GLARGINE 25 UNIT(S): 100 INJECTION, SOLUTION SUBCUTANEOUS at 23:36

## 2018-08-06 RX ADMIN — Medication 1 TABLET(S): at 12:43

## 2018-08-06 RX ADMIN — Medication 3 MILLILITER(S): at 22:33

## 2018-08-06 RX ADMIN — Medication 100 MILLIGRAM(S): at 16:17

## 2018-08-06 RX ADMIN — Medication 3: at 23:35

## 2018-08-06 RX ADMIN — POLYETHYLENE GLYCOL 3350 17 GRAM(S): 17 POWDER, FOR SOLUTION ORAL at 18:25

## 2018-08-06 RX ADMIN — Medication 3 UNIT(S): at 09:22

## 2018-08-06 RX ADMIN — SODIUM CHLORIDE 4 MILLILITER(S): 9 INJECTION INTRAMUSCULAR; INTRAVENOUS; SUBCUTANEOUS at 22:45

## 2018-08-06 RX ADMIN — Medication 40 MILLIGRAM(S): at 14:03

## 2018-08-06 RX ADMIN — PANTOPRAZOLE SODIUM 40 MILLIGRAM(S): 20 TABLET, DELAYED RELEASE ORAL at 06:31

## 2018-08-06 RX ADMIN — Medication 3 MILLILITER(S): at 03:45

## 2018-08-06 RX ADMIN — BUDESONIDE AND FORMOTEROL FUMARATE DIHYDRATE 2 PUFF(S): 160; 4.5 AEROSOL RESPIRATORY (INHALATION) at 09:23

## 2018-08-06 RX ADMIN — Medication 1: at 12:52

## 2018-08-06 RX ADMIN — BUDESONIDE AND FORMOTEROL FUMARATE DIHYDRATE 2 PUFF(S): 160; 4.5 AEROSOL RESPIRATORY (INHALATION) at 22:58

## 2018-08-06 RX ADMIN — MONTELUKAST 10 MILLIGRAM(S): 4 TABLET, CHEWABLE ORAL at 22:29

## 2018-08-06 RX ADMIN — Medication 3 MILLILITER(S): at 16:23

## 2018-08-06 RX ADMIN — Medication 3 MILLILITER(S): at 10:21

## 2018-08-06 RX ADMIN — POLYETHYLENE GLYCOL 3350 17 GRAM(S): 17 POWDER, FOR SOLUTION ORAL at 06:30

## 2018-08-06 RX ADMIN — Medication 1 TABLET(S): at 06:31

## 2018-08-06 RX ADMIN — SENNA PLUS 2 TABLET(S): 8.6 TABLET ORAL at 22:29

## 2018-08-06 RX ADMIN — Medication 3 UNIT(S): at 12:52

## 2018-08-06 RX ADMIN — Medication 1 APPLICATORFUL: at 18:26

## 2018-08-06 RX ADMIN — SIMETHICONE 80 MILLIGRAM(S): 80 TABLET, CHEWABLE ORAL at 22:29

## 2018-08-06 RX ADMIN — Medication 3: at 09:22

## 2018-08-06 RX ADMIN — Medication 1 TABLET(S): at 18:25

## 2018-08-06 RX ADMIN — Medication 20 MILLIGRAM(S): at 06:30

## 2018-08-06 RX ADMIN — Medication 3 MILLIGRAM(S): at 22:30

## 2018-08-06 RX ADMIN — Medication 1 APPLICATORFUL: at 05:13

## 2018-08-06 NOTE — PROGRESS NOTE ADULT - PROBLEM SELECTOR PLAN 3
Eosinophilia while on oral steroids. May suggest a role for biologic therapy. Continue Duoneb standing dose ATC.

## 2018-08-06 NOTE — PROGRESS NOTE ADULT - PROBLEM SELECTOR PLAN 2
- Likely 2/2 acute exacerbation vs. progression of bronchiectasis vs. [place].  - RVP negative.  - CT chest noncontrast reviewed, continue current treatment  -continue IV solumedrol 20mg Q6h  - c/w Bactrim for PCP ppx  - oxygen supplement as needed - Likely 2/2 acute exacerbation vs. progression of bronchiectasis vs. [place].  - RVP negative  - Aggressive chest PT and pulmonary toilet  - c/w Bactrim for PCP ppx  - oxygen supplement as needed  -steroids changed to prednisone 40mg po daily

## 2018-08-06 NOTE — PROGRESS NOTE ADULT - ATTENDING COMMENTS
Pt is a 64 yo F with h/o HTN, DM, allergic bronchopulmonary aspergillosis (s/p Itraconazole in 2015), diffuse cystic bronchiectasis with Stenotrophomonas colonization, RUL thin-walled cavitary lesion presented 2 to worsening cough productive of thick, yellow, nonbloody sputum; admitted for possible bronchiectasis exacerbation     Resp: Po steroid taper/ Cont Nebs/ Supplemental O2 prn  ID: Bactrim prophylaxis  FEN: ADA po diet  Endo: Adjust Lantus + Humalog +  Lispro to FS  GI: Pt noted to have increase in LFTs; repeat LFTs tomorrow  Social: Possible dc to home tomorrow Pt is a 62 yo F with h/o HTN, DM, allergic bronchopulmonary aspergillosis (s/p Itraconazole in 2015), diffuse cystic bronchiectasis with Stenotrophomonas colonization, RUL thin-walled cavitary lesion presented 2 to worsening cough productive of thick, yellow, nonbloody sputum; admitted for possible bronchiectasis exacerbation     Resp: Po steroid taper/ Cont Nebs/ Supplemental O2 prn  ID: Cont Bactrim   FEN: ADA po diet  Endo: Adjust Lantus + Humalog +  Lispro to FS  GI: Pt noted to have increase in LFTs; repeat LFTs tomorrow  Social: Possible dc to home tomorrow

## 2018-08-06 NOTE — PROGRESS NOTE ADULT - PROBLEM SELECTOR PLAN 1
- Likely 2/2 acute exacerbation vs. progression of bronchiectasis vs. BR[place].  - RVP negative.  - CT chest noncontrast reviewed, continue current treatment  - c/w IV steroids (solumedrol 20mg q 6h), , slow prednisone taper on d/c  - c/w Bactrim for PCP ppx  - oxygen supplement as needed  - Aggressive chest PT and pulmonary toilet - Likely 2/2 acute exacerbation vs. progression of bronchiectasis vs. [place].  - RVP negative.  - CT chest noncontrast reviewed, continue current treatment  - c/w Bactrim for PCP ppx  - oxygen supplement as needed  - Aggressive chest PT and pulmonary toilet  -steroids changed to prednisone 40mg po daily

## 2018-08-06 NOTE — PROGRESS NOTE ADULT - PROBLEM SELECTOR PLAN 5
- glucose high 2/2 IV steroids  - continue to monitor  -on SSI - glucose high 2/2 IV steroids  - continue to monitor  - on SSI - hyperglycemia 2/2 IV steroids  - continue to monitor  - on SSI

## 2018-08-06 NOTE — PROGRESS NOTE ADULT - SUBJECTIVE AND OBJECTIVE BOX
CHIEF COMPLAINT:    Interval Events:    REVIEW OF SYSTEMS:  Constitutional:   Eyes:  ENT:  CV:  Resp:  GI:  :  MSK:  Integumentary:  Neurological:  Psychiatric:  Endocrine:  Hematologic/Lymphatic:  Allergic/Immunologic:  [ ] All other systems negative  [ ] Unable to assess ROS because ________    OBJECTIVE:  ICU Vital Signs Last 24 Hrs  T(C): 36.7 (06 Aug 2018 06:54), Max: 36.7 (05 Aug 2018 13:36)  T(F): 98 (06 Aug 2018 06:54), Max: 98 (05 Aug 2018 13:36)  HR: 80 (06 Aug 2018 06:54) (78 - 96)  BP: 121/75 (06 Aug 2018 06:54) (115/70 - 121/75)  BP(mean): --  ABP: --  ABP(mean): --  RR: 20 (06 Aug 2018 06:54) (17 - 20)  SpO2: 95% (06 Aug 2018 06:54) (95% - 100%)        CAPILLARY BLOOD GLUCOSE      POCT Blood Glucose.: 194 mg/dL (05 Aug 2018 21:50)      PHYSICAL EXAM:  General:   HEENT:   Lymph Nodes:  Neck:   Respiratory:   Cardiovascular:   Abdomen:   Extremities:   Skin:   Neurological:  Psychiatry:    HOSPITAL MEDICATIONS:  MEDICATIONS  (STANDING):  ALBUTerol/ipratropium for Nebulization 3 milliLiter(s) Nebulizer every 6 hours  aspirin  chewable 81 milliGRAM(s) Oral daily  buDESOnide  80 MICROgram(s)/formoterol 4.5 MICROgram(s) Inhaler 2 Puff(s) Inhalation two times a day  chlorhexidine 4% Liquid 1 Application(s) Topical <User Schedule>  clotrimazole 2% Vaginal Cream 1 Applicatorful Vaginal two times a day  dextrose 5%. 1000 milliLiter(s) (50 mL/Hr) IV Continuous <Continuous>  dextrose 50% Injectable 12.5 Gram(s) IV Push once  dextrose 50% Injectable 25 Gram(s) IV Push once  dextrose 50% Injectable 25 Gram(s) IV Push once  enoxaparin Injectable 40 milliGRAM(s) SubCutaneous every 24 hours  insulin glargine Injectable (LANTUS) 25 Unit(s) SubCutaneous at bedtime  insulin lispro (HumaLOG) corrective regimen sliding scale   SubCutaneous three times a day before meals  insulin lispro (HumaLOG) corrective regimen sliding scale   SubCutaneous at bedtime  insulin lispro Injectable (HumaLOG) 3 Unit(s) SubCutaneous three times a day before meals  methylPREDNISolone sodium succinate Injectable 20 milliGRAM(s) IV Push every 6 hours  montelukast 10 milliGRAM(s) Oral at bedtime  multivitamin 1 Tablet(s) Oral daily  pantoprazole    Tablet 40 milliGRAM(s) Oral before breakfast  polyethylene glycol 3350 17 Gram(s) Oral two times a day  senna 2 Tablet(s) Oral at bedtime  sodium chloride 3%  Inhalation 4 milliLiter(s) Inhalation two times a day  trimethoprim  160 mG/sulfamethoxazole 800 mG 1 Tablet(s) Oral two times a day    MEDICATIONS  (PRN):  bisacodyl Suppository 10 milliGRAM(s) Rectal daily PRN Constipation  dextrose 40% Gel 15 Gram(s) Oral once PRN Blood Glucose LESS THAN 70 milliGRAM(s)/deciliter  glucagon  Injectable 1 milliGRAM(s) IntraMuscular once PRN Glucose LESS THAN 70 milligrams/deciliter  guaiFENesin    Syrup 100 milliGRAM(s) Oral every 6 hours PRN Cough  hydrocortisone hemorrhoidal Suppository 1 Suppository(s) Rectal two times a day PRN rectal pain  melatonin 3 milliGRAM(s) Oral at bedtime PRN Insomnia      LABS:                        10.4   8.38  )-----------( 217      ( 06 Aug 2018 06:00 )             33.7     08-06    132<L>  |  98  |  26<H>  ----------------------------<  403<H>  5.2   |  23  |  0.73    Ca    9.2      06 Aug 2018 06:00    TPro  6.9  /  Alb  3.8  /  TBili  < 0.2<L>  /  DBili  x   /  AST  82<H>  /  ALT  78<H>  /  AlkPhos  156<H>  08-06              MICROBIOLOGY:     RADIOLOGY:  [ ] Reviewed and interpreted by me    PULMONARY FUNCTION TESTS:    EKG: CHIEF COMPLAINT: Patient is a 63y old  Female who presents with a chief complaint of Cough, SOB (03 Aug 2018 19:29)      Interval Events:    OBJECTIVE:  ICU Vital Signs Last 24 Hrs  T(C): 36.7 (06 Aug 2018 06:54), Max: 36.7 (05 Aug 2018 13:36)  T(F): 98 (06 Aug 2018 06:54), Max: 98 (05 Aug 2018 13:36)  HR: 80 (06 Aug 2018 06:54) (78 - 96)  BP: 121/75 (06 Aug 2018 06:54) (115/70 - 121/75)  BP(mean): --  ABP: --  ABP(mean): --  RR: 20 (06 Aug 2018 06:54) (17 - 20)  SpO2: 95% (06 Aug 2018 06:54) (95% - 100%)        CAPILLARY BLOOD GLUCOSE      POCT Blood Glucose.: 194 mg/dL (05 Aug 2018 21:50)      HOSPITAL MEDICATIONS:  MEDICATIONS  (STANDING):  ALBUTerol/ipratropium for Nebulization 3 milliLiter(s) Nebulizer every 6 hours  aspirin  chewable 81 milliGRAM(s) Oral daily  buDESOnide  80 MICROgram(s)/formoterol 4.5 MICROgram(s) Inhaler 2 Puff(s) Inhalation two times a day  chlorhexidine 4% Liquid 1 Application(s) Topical <User Schedule>  clotrimazole 2% Vaginal Cream 1 Applicatorful Vaginal two times a day  dextrose 5%. 1000 milliLiter(s) (50 mL/Hr) IV Continuous <Continuous>  dextrose 50% Injectable 12.5 Gram(s) IV Push once  dextrose 50% Injectable 25 Gram(s) IV Push once  dextrose 50% Injectable 25 Gram(s) IV Push once  enoxaparin Injectable 40 milliGRAM(s) SubCutaneous every 24 hours  insulin glargine Injectable (LANTUS) 25 Unit(s) SubCutaneous at bedtime  insulin lispro (HumaLOG) corrective regimen sliding scale   SubCutaneous three times a day before meals  insulin lispro (HumaLOG) corrective regimen sliding scale   SubCutaneous at bedtime  insulin lispro Injectable (HumaLOG) 3 Unit(s) SubCutaneous three times a day before meals  methylPREDNISolone sodium succinate Injectable 20 milliGRAM(s) IV Push every 6 hours  montelukast 10 milliGRAM(s) Oral at bedtime  multivitamin 1 Tablet(s) Oral daily  pantoprazole    Tablet 40 milliGRAM(s) Oral before breakfast  polyethylene glycol 3350 17 Gram(s) Oral two times a day  senna 2 Tablet(s) Oral at bedtime  sodium chloride 3%  Inhalation 4 milliLiter(s) Inhalation two times a day  trimethoprim  160 mG/sulfamethoxazole 800 mG 1 Tablet(s) Oral two times a day    MEDICATIONS  (PRN):  bisacodyl Suppository 10 milliGRAM(s) Rectal daily PRN Constipation  dextrose 40% Gel 15 Gram(s) Oral once PRN Blood Glucose LESS THAN 70 milliGRAM(s)/deciliter  glucagon  Injectable 1 milliGRAM(s) IntraMuscular once PRN Glucose LESS THAN 70 milligrams/deciliter  guaiFENesin    Syrup 100 milliGRAM(s) Oral every 6 hours PRN Cough  hydrocortisone hemorrhoidal Suppository 1 Suppository(s) Rectal two times a day PRN rectal pain  melatonin 3 milliGRAM(s) Oral at bedtime PRN Insomnia      LABS:                        10.4   8.38  )-----------( 217      ( 06 Aug 2018 06:00 )             33.7     08-06    132<L>  |  98  |  26<H>  ----------------------------<  403<H>  5.2   |  23  |  0.73    Ca    9.2      06 Aug 2018 06:00    TPro  6.9  /  Alb  3.8  /  TBili  < 0.2<L>  /  DBili  x   /  AST  82<H>  /  ALT  78<H>  /  AlkPhos  156<H>  08-06              MICROBIOLOGY:     RADIOLOGY:  [ ] Reviewed and interpreted by me    PULMONARY FUNCTION TESTS:    EKG: CHIEF COMPLAINT: Patient is a 63y old  Female who presents with a chief complaint of Cough, SOB (03 Aug 2018 19:29)    Interval Events:    OBJECTIVE:  ICU Vital Signs Last 24 Hrs  T(C): 36.7 (06 Aug 2018 06:54), Max: 36.7 (05 Aug 2018 13:36)  T(F): 98 (06 Aug 2018 06:54), Max: 98 (05 Aug 2018 13:36)  HR: 80 (06 Aug 2018 06:54) (78 - 96)  BP: 121/75 (06 Aug 2018 06:54) (115/70 - 121/75)  BP(mean): --  ABP: --  ABP(mean): --  RR: 20 (06 Aug 2018 06:54) (17 - 20)  SpO2: 95% (06 Aug 2018 06:54) (95% - 100%)        CAPILLARY BLOOD GLUCOSE      POCT Blood Glucose.: 194 mg/dL (05 Aug 2018 21:50)      HOSPITAL MEDICATIONS:  MEDICATIONS  (STANDING):  ALBUTerol/ipratropium for Nebulization 3 milliLiter(s) Nebulizer every 6 hours  aspirin  chewable 81 milliGRAM(s) Oral daily  buDESOnide  80 MICROgram(s)/formoterol 4.5 MICROgram(s) Inhaler 2 Puff(s) Inhalation two times a day  chlorhexidine 4% Liquid 1 Application(s) Topical <User Schedule>  clotrimazole 2% Vaginal Cream 1 Applicatorful Vaginal two times a day  dextrose 5%. 1000 milliLiter(s) (50 mL/Hr) IV Continuous <Continuous>  dextrose 50% Injectable 12.5 Gram(s) IV Push once  dextrose 50% Injectable 25 Gram(s) IV Push once  dextrose 50% Injectable 25 Gram(s) IV Push once  enoxaparin Injectable 40 milliGRAM(s) SubCutaneous every 24 hours  insulin glargine Injectable (LANTUS) 25 Unit(s) SubCutaneous at bedtime  insulin lispro (HumaLOG) corrective regimen sliding scale   SubCutaneous three times a day before meals  insulin lispro (HumaLOG) corrective regimen sliding scale   SubCutaneous at bedtime  insulin lispro Injectable (HumaLOG) 3 Unit(s) SubCutaneous three times a day before meals  methylPREDNISolone sodium succinate Injectable 20 milliGRAM(s) IV Push every 6 hours  montelukast 10 milliGRAM(s) Oral at bedtime  multivitamin 1 Tablet(s) Oral daily  pantoprazole    Tablet 40 milliGRAM(s) Oral before breakfast  polyethylene glycol 3350 17 Gram(s) Oral two times a day  senna 2 Tablet(s) Oral at bedtime  sodium chloride 3%  Inhalation 4 milliLiter(s) Inhalation two times a day  trimethoprim  160 mG/sulfamethoxazole 800 mG 1 Tablet(s) Oral two times a day    MEDICATIONS  (PRN):  bisacodyl Suppository 10 milliGRAM(s) Rectal daily PRN Constipation  dextrose 40% Gel 15 Gram(s) Oral once PRN Blood Glucose LESS THAN 70 milliGRAM(s)/deciliter  glucagon  Injectable 1 milliGRAM(s) IntraMuscular once PRN Glucose LESS THAN 70 milligrams/deciliter  guaiFENesin    Syrup 100 milliGRAM(s) Oral every 6 hours PRN Cough  hydrocortisone hemorrhoidal Suppository 1 Suppository(s) Rectal two times a day PRN rectal pain  melatonin 3 milliGRAM(s) Oral at bedtime PRN Insomnia      LABS:                        10.4   8.38  )-----------( 217      ( 06 Aug 2018 06:00 )             33.7     08-06    132<L>  |  98  |  26<H>  ----------------------------<  403<H>  5.2   |  23  |  0.73    Ca    9.2      06 Aug 2018 06:00    TPro  6.9  /  Alb  3.8  /  TBili  < 0.2<L>  /  DBili  x   /  AST  82<H>  /  ALT  78<H>  /  AlkPhos  156<H>  08-06              MICROBIOLOGY:     RADIOLOGY:  [ ] Reviewed and interpreted by me    PULMONARY FUNCTION TESTS:    EKG: CHIEF COMPLAINT: Patient is a 63y old  Female who presents with a chief complaint of Cough, SOB (03 Aug 2018 19:29)    Interval Events: no acute events overnight    OBJECTIVE:  ICU Vital Signs Last 24 Hrs  T(C): 36.7 (06 Aug 2018 06:54), Max: 36.7 (05 Aug 2018 13:36)  T(F): 98 (06 Aug 2018 06:54), Max: 98 (05 Aug 2018 13:36)  HR: 80 (06 Aug 2018 06:54) (78 - 96)  BP: 121/75 (06 Aug 2018 06:54) (115/70 - 121/75)  BP(mean): --  ABP: --  ABP(mean): --  RR: 20 (06 Aug 2018 06:54) (17 - 20)  SpO2: 95% (06 Aug 2018 06:54) (95% - 100%)        CAPILLARY BLOOD GLUCOSE      POCT Blood Glucose.: 194 mg/dL (05 Aug 2018 21:50)      HOSPITAL MEDICATIONS:  MEDICATIONS  (STANDING):  ALBUTerol/ipratropium for Nebulization 3 milliLiter(s) Nebulizer every 6 hours  aspirin  chewable 81 milliGRAM(s) Oral daily  buDESOnide  80 MICROgram(s)/formoterol 4.5 MICROgram(s) Inhaler 2 Puff(s) Inhalation two times a day  chlorhexidine 4% Liquid 1 Application(s) Topical <User Schedule>  clotrimazole 2% Vaginal Cream 1 Applicatorful Vaginal two times a day  dextrose 5%. 1000 milliLiter(s) (50 mL/Hr) IV Continuous <Continuous>  dextrose 50% Injectable 12.5 Gram(s) IV Push once  dextrose 50% Injectable 25 Gram(s) IV Push once  dextrose 50% Injectable 25 Gram(s) IV Push once  enoxaparin Injectable 40 milliGRAM(s) SubCutaneous every 24 hours  insulin glargine Injectable (LANTUS) 25 Unit(s) SubCutaneous at bedtime  insulin lispro (HumaLOG) corrective regimen sliding scale   SubCutaneous three times a day before meals  insulin lispro (HumaLOG) corrective regimen sliding scale   SubCutaneous at bedtime  insulin lispro Injectable (HumaLOG) 3 Unit(s) SubCutaneous three times a day before meals  methylPREDNISolone sodium succinate Injectable 20 milliGRAM(s) IV Push every 6 hours  montelukast 10 milliGRAM(s) Oral at bedtime  multivitamin 1 Tablet(s) Oral daily  pantoprazole    Tablet 40 milliGRAM(s) Oral before breakfast  polyethylene glycol 3350 17 Gram(s) Oral two times a day  senna 2 Tablet(s) Oral at bedtime  sodium chloride 3%  Inhalation 4 milliLiter(s) Inhalation two times a day  trimethoprim  160 mG/sulfamethoxazole 800 mG 1 Tablet(s) Oral two times a day    MEDICATIONS  (PRN):  bisacodyl Suppository 10 milliGRAM(s) Rectal daily PRN Constipation  dextrose 40% Gel 15 Gram(s) Oral once PRN Blood Glucose LESS THAN 70 milliGRAM(s)/deciliter  glucagon  Injectable 1 milliGRAM(s) IntraMuscular once PRN Glucose LESS THAN 70 milligrams/deciliter  guaiFENesin    Syrup 100 milliGRAM(s) Oral every 6 hours PRN Cough  hydrocortisone hemorrhoidal Suppository 1 Suppository(s) Rectal two times a day PRN rectal pain  melatonin 3 milliGRAM(s) Oral at bedtime PRN Insomnia      LABS:                        10.4   8.38  )-----------( 217      ( 06 Aug 2018 06:00 )             33.7     08-06    132<L>  |  98  |  26<H>  ----------------------------<  403<H>  5.2   |  23  |  0.73    Ca    9.2      06 Aug 2018 06:00    TPro  6.9  /  Alb  3.8  /  TBili  < 0.2<L>  /  DBili  x   /  AST  82<H>  /  ALT  78<H>  /  AlkPhos  156<H>  08-06              MICROBIOLOGY:     RADIOLOGY:  [ ] Reviewed and interpreted by me    PULMONARY FUNCTION TESTS:    EKG:

## 2018-08-07 VITALS — RESPIRATION RATE: 95 BRPM

## 2018-08-07 LAB
ALBUMIN SERPL ELPH-MCNC: 3.8 G/DL — SIGNIFICANT CHANGE UP (ref 3.3–5)
ALP SERPL-CCNC: 153 U/L — HIGH (ref 40–120)
ALT FLD-CCNC: 95 U/L — HIGH (ref 4–33)
AST SERPL-CCNC: 76 U/L — HIGH (ref 4–32)
BILIRUB SERPL-MCNC: 0.2 MG/DL — SIGNIFICANT CHANGE UP (ref 0.2–1.2)
BUN SERPL-MCNC: 29 MG/DL — HIGH (ref 7–23)
CALCIUM SERPL-MCNC: 9.5 MG/DL — SIGNIFICANT CHANGE UP (ref 8.4–10.5)
CHLORIDE SERPL-SCNC: 97 MMOL/L — LOW (ref 98–107)
CO2 SERPL-SCNC: 18 MMOL/L — LOW (ref 22–31)
CREAT SERPL-MCNC: 0.65 MG/DL — SIGNIFICANT CHANGE UP (ref 0.5–1.3)
GLUCOSE BLDC GLUCOMTR-MCNC: 207 MG/DL — HIGH (ref 70–99)
GLUCOSE BLDC GLUCOMTR-MCNC: 266 MG/DL — HIGH (ref 70–99)
GLUCOSE BLDC GLUCOMTR-MCNC: 286 MG/DL — HIGH (ref 70–99)
GLUCOSE BLDC GLUCOMTR-MCNC: 296 MG/DL — HIGH (ref 70–99)
GLUCOSE SERPL-MCNC: 269 MG/DL — HIGH (ref 70–99)
POTASSIUM SERPL-MCNC: 5 MMOL/L — SIGNIFICANT CHANGE UP (ref 3.5–5.3)
POTASSIUM SERPL-SCNC: 5 MMOL/L — SIGNIFICANT CHANGE UP (ref 3.5–5.3)
PROT SERPL-MCNC: 6.9 G/DL — SIGNIFICANT CHANGE UP (ref 6–8.3)
SODIUM SERPL-SCNC: 130 MMOL/L — LOW (ref 135–145)

## 2018-08-07 PROCEDURE — 99239 HOSP IP/OBS DSCHRG MGMT >30: CPT

## 2018-08-07 RX ORDER — METFORMIN HYDROCHLORIDE 850 MG/1
1 TABLET ORAL
Qty: 60 | Refills: 0 | OUTPATIENT
Start: 2018-08-07 | End: 2018-09-05

## 2018-08-07 RX ORDER — SENNA PLUS 8.6 MG/1
2 TABLET ORAL
Qty: 0 | Refills: 0 | COMMUNITY
Start: 2018-08-07

## 2018-08-07 RX ORDER — INSULIN GLARGINE 100 [IU]/ML
25 INJECTION, SOLUTION SUBCUTANEOUS
Qty: 1 | Refills: 0
Start: 2018-08-07 | End: 2018-09-05

## 2018-08-07 RX ORDER — POLYETHYLENE GLYCOL 3350 17 G/17G
17 POWDER, FOR SOLUTION ORAL
Qty: 0 | Refills: 0 | COMMUNITY
Start: 2018-08-07

## 2018-08-07 RX ORDER — HYDROCORTISONE 1 %
1 OINTMENT (GRAM) TOPICAL
Qty: 0 | Refills: 0 | COMMUNITY
Start: 2018-08-07

## 2018-08-07 RX ORDER — ALBUTEROL 90 UG/1
3 AEROSOL, METERED ORAL
Qty: 1 | Refills: 0
Start: 2018-08-07 | End: 2018-08-16

## 2018-08-07 RX ORDER — MONTELUKAST 4 MG/1
1 TABLET, CHEWABLE ORAL
Qty: 30 | Refills: 0 | OUTPATIENT
Start: 2018-08-07 | End: 2018-09-05

## 2018-08-07 RX ADMIN — Medication 81 MILLIGRAM(S): at 13:19

## 2018-08-07 RX ADMIN — Medication 3 MILLILITER(S): at 08:59

## 2018-08-07 RX ADMIN — ENOXAPARIN SODIUM 40 MILLIGRAM(S): 100 INJECTION SUBCUTANEOUS at 09:23

## 2018-08-07 RX ADMIN — Medication 3 MILLILITER(S): at 15:36

## 2018-08-07 RX ADMIN — Medication 1 TABLET(S): at 18:20

## 2018-08-07 RX ADMIN — PANTOPRAZOLE SODIUM 40 MILLIGRAM(S): 20 TABLET, DELAYED RELEASE ORAL at 05:52

## 2018-08-07 RX ADMIN — Medication 3: at 13:18

## 2018-08-07 RX ADMIN — Medication 3 UNIT(S): at 13:19

## 2018-08-07 RX ADMIN — Medication 3: at 09:22

## 2018-08-07 RX ADMIN — Medication 2: at 18:19

## 2018-08-07 RX ADMIN — Medication 1 TABLET(S): at 05:52

## 2018-08-07 RX ADMIN — BUDESONIDE AND FORMOTEROL FUMARATE DIHYDRATE 2 PUFF(S): 160; 4.5 AEROSOL RESPIRATORY (INHALATION) at 08:58

## 2018-08-07 RX ADMIN — Medication 3 UNIT(S): at 18:20

## 2018-08-07 RX ADMIN — Medication 3 UNIT(S): at 09:22

## 2018-08-07 RX ADMIN — POLYETHYLENE GLYCOL 3350 17 GRAM(S): 17 POWDER, FOR SOLUTION ORAL at 05:52

## 2018-08-07 RX ADMIN — SODIUM CHLORIDE 4 MILLILITER(S): 9 INJECTION INTRAMUSCULAR; INTRAVENOUS; SUBCUTANEOUS at 08:58

## 2018-08-07 RX ADMIN — Medication 3 MILLILITER(S): at 03:28

## 2018-08-07 RX ADMIN — Medication 40 MILLIGRAM(S): at 05:52

## 2018-08-07 RX ADMIN — Medication 1 TABLET(S): at 13:19

## 2018-08-07 RX ADMIN — POLYETHYLENE GLYCOL 3350 17 GRAM(S): 17 POWDER, FOR SOLUTION ORAL at 18:20

## 2018-08-07 NOTE — CHART NOTE - NSCHARTNOTEFT_GEN_A_CORE
ADS NIGHT COVERAGE    Notified by RN that patient wants medications to be sent to [facility] not [facility]. As per RN, daughter aware that pharmacy is not open till tomorrow, still would like it to be sent there. Prescriptions sent to desired pharmacy. [facility] called to cancel prescriptions.     Sabino VERA  [phone]

## 2018-08-07 NOTE — PROGRESS NOTE ADULT - PROBLEM SELECTOR PLAN 1
- Likely 2/2 acute exacerbation vs. progression of bronchiectasis vs. BRENTON.  - RVP negative.  - CT chest noncontrast reviewed, continue current treatment  - c/w Bactrim for PCP ppx  - oxygen supplement as needed  - Aggressive chest PT and pulmonary toilet  -steroids prednisone 40mg po daily

## 2018-08-07 NOTE — PROGRESS NOTE ADULT - [facility] HE PE MLT D E PC
regular rate and rhythm
no murmur/regular rate and rhythm
regular rate and rhythm

## 2018-08-07 NOTE — PROGRESS NOTE ADULT - PROBLEM SELECTOR PROBLEM 5
Anemia
DM (diabetes mellitus)
Anemia

## 2018-08-07 NOTE — PROGRESS NOTE ADULT - PROBLEM SELECTOR PROBLEM 7
Need for prophylactic measure
Vaginal itching
Need for prophylactic measure

## 2018-08-07 NOTE — PROGRESS NOTE ADULT - GASTROINTESTINAL DETAILS
soft/nontender/no distention
nontender/no distention/normal/soft
nontender/no distention/soft/no visible external hemorrhoids
soft/nontender/normal
normal/nontender/soft

## 2018-08-07 NOTE — PROGRESS NOTE ADULT - MS EXT PE MLT D E PC
no pedal edema/no cyanosis/no clubbing
no cyanosis/no pedal edema
normal
no clubbing/no cyanosis/normal

## 2018-08-07 NOTE — PROGRESS NOTE ADULT - RS GEN PE MLT RESP DETAILS PC
rhonchi/wheezes/airway patent/breath sounds equal
diminished breath sounds, L/good air movement/diminished breath sounds, R
wheezes/Diffuse wheezing./airway patent
rhonchi/wheezes
diminished breath sounds, R/rhonchi
diminished at bases/airway patent/breath sounds equal

## 2018-08-07 NOTE — PROGRESS NOTE ADULT - PROBLEM SELECTOR PROBLEM 2
Bronchiectasis

## 2018-08-07 NOTE — PROVIDER CONTACT NOTE (OTHER) - BACKGROUND
pt admitted for bronchiectasis with PMH of HTN, allergic bronchopulmonary aspergillosis and DM
Pt A&Ox4, VSS. Makes needs known. No c/o pain. Admitted for bronchiestasis without complication.

## 2018-08-07 NOTE — PROVIDER CONTACT NOTE (OTHER) - ACTION/TREATMENT ORDERED:
give pt snack  ok to give 25 units of lantus
Provider aware. Ordered to give ordered dose & continue to monitor. Recheck in the morning according to current order AC/HS. Safety maintained & will continue to monitor during shift.

## 2018-08-07 NOTE — PROGRESS NOTE ADULT - ATTENDING COMMENTS
Pt is a 62 yo F with h/o HTN, DM, allergic bronchopulmonary aspergillosis (s/p Itraconazole in 2015), diffuse cystic bronchiectasis with Stenotrophomonas colonization, RUL thin-walled cavitary lesion presented 2 to worsening cough productive of thick, yellow, nonbloody sputum; admitted for possible bronchiectasis exacerbation     Resp: Po steroid taper/ Cont Nebs/ Supplemental O2 prn  ID: Finish course of Bactrim  FEN: ADA po diet  Endo: Adjust Lantus + Humalog +  Lispro to FS  GI: Pt noted to have increase in LFTs, ? peaking  Social: Possible dc to home today

## 2018-08-07 NOTE — PROGRESS NOTE ADULT - PROBLEM SELECTOR PLAN 2
- Likely 2/2 acute exacerbation vs. progression of bronchiectasis vs. BR[place]ON.  - RVP negative  - Aggressive chest PT and pulmonary toilet  - c/w Bactrim for PCP ppx  - oxygen supplement as needed  -steroids changed to prednisone 40mg po daily

## 2018-08-07 NOTE — PROGRESS NOTE ADULT - PROBLEM SELECTOR PROBLEM 1
Productive cough

## 2018-08-07 NOTE — PROGRESS NOTE ADULT - SUBJECTIVE AND OBJECTIVE BOX
CHIEF COMPLAINT: Patient is a 63y old  Female who presents with a chief complaint of Cough, SOB (03 Aug 2018 19:29)    Interval Events:    OBJECTIVE:  ICU Vital Signs Last 24 Hrs  T(C): 36.8 (07 Aug 2018 05:50), Max: 36.8 (06 Aug 2018 15:00)  T(F): 98.2 (07 Aug 2018 05:50), Max: 98.2 (06 Aug 2018 15:00)  HR: 77 (07 Aug 2018 05:50) (74 - 91)  BP: 130/70 (07 Aug 2018 05:50) (119/68 - 130/70)  BP(mean): --  ABP: --  ABP(mean): --  RR: 18 (07 Aug 2018 05:50) (18 - 18)  SpO2: 98% (07 Aug 2018 05:50) (96% - 100%)        CAPILLARY BLOOD GLUCOSE      POCT Blood Glucose.: 388 mg/dL (06 Aug 2018 23:25)        HOSPITAL MEDICATIONS:  MEDICATIONS  (STANDING):  ALBUTerol/ipratropium for Nebulization 3 milliLiter(s) Nebulizer every 6 hours  aspirin  chewable 81 milliGRAM(s) Oral daily  buDESOnide  80 MICROgram(s)/formoterol 4.5 MICROgram(s) Inhaler 2 Puff(s) Inhalation two times a day  chlorhexidine 4% Liquid 1 Application(s) Topical <User Schedule>  dextrose 5%. 1000 milliLiter(s) (50 mL/Hr) IV Continuous <Continuous>  dextrose 50% Injectable 12.5 Gram(s) IV Push once  dextrose 50% Injectable 25 Gram(s) IV Push once  dextrose 50% Injectable 25 Gram(s) IV Push once  enoxaparin Injectable 40 milliGRAM(s) SubCutaneous every 24 hours  insulin glargine Injectable (LANTUS) 25 Unit(s) SubCutaneous at bedtime  insulin lispro (HumaLOG) corrective regimen sliding scale   SubCutaneous three times a day before meals  insulin lispro (HumaLOG) corrective regimen sliding scale   SubCutaneous at bedtime  insulin lispro Injectable (HumaLOG) 3 Unit(s) SubCutaneous three times a day before meals  montelukast 10 milliGRAM(s) Oral at bedtime  multivitamin 1 Tablet(s) Oral daily  pantoprazole    Tablet 40 milliGRAM(s) Oral before breakfast  polyethylene glycol 3350 17 Gram(s) Oral two times a day  predniSONE   Tablet 40 milliGRAM(s) Oral daily  senna 2 Tablet(s) Oral at bedtime  sodium chloride 3%  Inhalation 4 milliLiter(s) Inhalation two times a day  trimethoprim  160 mG/sulfamethoxazole 800 mG 1 Tablet(s) Oral two times a day    MEDICATIONS  (PRN):  bisacodyl Suppository 10 milliGRAM(s) Rectal daily PRN Constipation  dextrose 40% Gel 15 Gram(s) Oral once PRN Blood Glucose LESS THAN 70 milliGRAM(s)/deciliter  glucagon  Injectable 1 milliGRAM(s) IntraMuscular once PRN Glucose LESS THAN 70 milligrams/deciliter  guaiFENesin    Syrup 100 milliGRAM(s) Oral every 6 hours PRN Cough  hydrocortisone hemorrhoidal Suppository 1 Suppository(s) Rectal two times a day PRN rectal pain  melatonin 3 milliGRAM(s) Oral at bedtime PRN Insomnia      LABS:                        10.4   8.38  )-----------( 217      ( 06 Aug 2018 06:00 )             33.7     08-07    130<L>  |  97<L>  |  29<H>  ----------------------------<  269<H>  5.0   |  18<L>  |  0.65    Ca    9.5      07 Aug 2018 03:00    TPro  6.9  /  Alb  3.8  /  TBili  0.2  /  DBili  x   /  AST  76<H>  /  ALT  95<H>  /  AlkPhos  153<H>  08-07              MICROBIOLOGY:     RADIOLOGY:  [ ] Reviewed and interpreted by me    PULMONARY FUNCTION TESTS:    EKG: CHIEF COMPLAINT: Patient is a 63y old  Female who presents with a chief complaint of Cough, SOB (03 Aug 2018 19:29)    Interval Events: no acute events    OBJECTIVE:  ICU Vital Signs Last 24 Hrs  T(C): 36.8 (07 Aug 2018 05:50), Max: 36.8 (06 Aug 2018 15:00)  T(F): 98.2 (07 Aug 2018 05:50), Max: 98.2 (06 Aug 2018 15:00)  HR: 77 (07 Aug 2018 05:50) (74 - 91)  BP: 130/70 (07 Aug 2018 05:50) (119/68 - 130/70)  BP(mean): --  ABP: --  ABP(mean): --  RR: 18 (07 Aug 2018 05:50) (18 - 18)  SpO2: 98% (07 Aug 2018 05:50) (96% - 100%)        CAPILLARY BLOOD GLUCOSE      POCT Blood Glucose.: 388 mg/dL (06 Aug 2018 23:25)        HOSPITAL MEDICATIONS:  MEDICATIONS  (STANDING):  ALBUTerol/ipratropium for Nebulization 3 milliLiter(s) Nebulizer every 6 hours  aspirin  chewable 81 milliGRAM(s) Oral daily  buDESOnide  80 MICROgram(s)/formoterol 4.5 MICROgram(s) Inhaler 2 Puff(s) Inhalation two times a day  chlorhexidine 4% Liquid 1 Application(s) Topical <User Schedule>  dextrose 5%. 1000 milliLiter(s) (50 mL/Hr) IV Continuous <Continuous>  dextrose 50% Injectable 12.5 Gram(s) IV Push once  dextrose 50% Injectable 25 Gram(s) IV Push once  dextrose 50% Injectable 25 Gram(s) IV Push once  enoxaparin Injectable 40 milliGRAM(s) SubCutaneous every 24 hours  insulin glargine Injectable (LANTUS) 25 Unit(s) SubCutaneous at bedtime  insulin lispro (HumaLOG) corrective regimen sliding scale   SubCutaneous three times a day before meals  insulin lispro (HumaLOG) corrective regimen sliding scale   SubCutaneous at bedtime  insulin lispro Injectable (HumaLOG) 3 Unit(s) SubCutaneous three times a day before meals  montelukast 10 milliGRAM(s) Oral at bedtime  multivitamin 1 Tablet(s) Oral daily  pantoprazole    Tablet 40 milliGRAM(s) Oral before breakfast  polyethylene glycol 3350 17 Gram(s) Oral two times a day  predniSONE   Tablet 40 milliGRAM(s) Oral daily  senna 2 Tablet(s) Oral at bedtime  sodium chloride 3%  Inhalation 4 milliLiter(s) Inhalation two times a day  trimethoprim  160 mG/sulfamethoxazole 800 mG 1 Tablet(s) Oral two times a day    MEDICATIONS  (PRN):  bisacodyl Suppository 10 milliGRAM(s) Rectal daily PRN Constipation  dextrose 40% Gel 15 Gram(s) Oral once PRN Blood Glucose LESS THAN 70 milliGRAM(s)/deciliter  glucagon  Injectable 1 milliGRAM(s) IntraMuscular once PRN Glucose LESS THAN 70 milligrams/deciliter  guaiFENesin    Syrup 100 milliGRAM(s) Oral every 6 hours PRN Cough  hydrocortisone hemorrhoidal Suppository 1 Suppository(s) Rectal two times a day PRN rectal pain  melatonin 3 milliGRAM(s) Oral at bedtime PRN Insomnia      LABS:                        10.4   8.38  )-----------( 217      ( 06 Aug 2018 06:00 )             33.7     08-07    130<L>  |  97<L>  |  29<H>  ----------------------------<  269<H>  5.0   |  18<L>  |  0.65    Ca    9.5      07 Aug 2018 03:00    TPro  6.9  /  Alb  3.8  /  TBili  0.2  /  DBili  x   /  AST  76<H>  /  ALT  95<H>  /  AlkPhos  153<H>  08-07              MICROBIOLOGY:     RADIOLOGY:  [ ] Reviewed and interpreted by me    PULMONARY FUNCTION TESTS:    EKG:

## 2018-08-07 NOTE — CHART NOTE - NSCHARTNOTEFT_GEN_A_CORE
Source: Patient [ ]    Family [ ]     other [x ] nursing    Diet : Consistent carbohydrate / no snacks     Patient reports fair appetite, PO intake , no recent wt. available , last wt. from admission. Pt. tolerating PO , able to take > 75 % of the meals       Pertinent Medications: MEDICATIONS  (STANDING):  ALBUTerol/ipratropium for Nebulization 3 milliLiter(s) Nebulizer every 6 hours  aspirin  chewable 81 milliGRAM(s) Oral daily  buDESOnide  80 MICROgram(s)/formoterol 4.5 MICROgram(s) Inhaler 2 Puff(s) Inhalation two times a day  chlorhexidine 4% Liquid 1 Application(s) Topical <User Schedule>  dextrose 5%. 1000 milliLiter(s) (50 mL/Hr) IV Continuous <Continuous>  dextrose 50% Injectable 12.5 Gram(s) IV Push once  dextrose 50% Injectable 25 Gram(s) IV Push once  dextrose 50% Injectable 25 Gram(s) IV Push once  enoxaparin Injectable 40 milliGRAM(s) SubCutaneous every 24 hours  insulin glargine Injectable (LANTUS) 25 Unit(s) SubCutaneous at bedtime  insulin lispro (HumaLOG) corrective regimen sliding scale   SubCutaneous three times a day before meals  insulin lispro (HumaLOG) corrective regimen sliding scale   SubCutaneous at bedtime  insulin lispro Injectable (HumaLOG) 3 Unit(s) SubCutaneous three times a day before meals  montelukast 10 milliGRAM(s) Oral at bedtime  multivitamin 1 Tablet(s) Oral daily  pantoprazole    Tablet 40 milliGRAM(s) Oral before breakfast  polyethylene glycol 3350 17 Gram(s) Oral two times a day  predniSONE   Tablet 40 milliGRAM(s) Oral daily  senna 2 Tablet(s) Oral at bedtime  sodium chloride 3%  Inhalation 4 milliLiter(s) Inhalation two times a day  trimethoprim  160 mG/sulfamethoxazole 800 mG 1 Tablet(s) Oral two times a day    MEDICATIONS  (PRN):  bisacodyl Suppository 10 milliGRAM(s) Rectal daily PRN Constipation  dextrose 40% Gel 15 Gram(s) Oral once PRN Blood Glucose LESS THAN 70 milliGRAM(s)/deciliter  glucagon  Injectable 1 milliGRAM(s) IntraMuscular once PRN Glucose LESS THAN 70 milligrams/deciliter  guaiFENesin    Syrup 100 milliGRAM(s) Oral every 6 hours PRN Cough  hydrocortisone hemorrhoidal Suppository 1 Suppository(s) Rectal two times a day PRN rectal pain  melatonin 3 milliGRAM(s) Oral at bedtime PRN Insomnia    Pertinent Labs:  08-07 Na130 mmol/L<L> Glu 269 mg/dL<H> K+ 5.0 mmol/L Cr  0.65 mg/dL BUN 29 mg/dL<H> 08-07 Alb 3.8 g/dL 07-31 [id number] 9.1 %<H>      Skin: intact      Recommend to continue current diet     Monitoring and Evaluation:  PO intake ,Tolerance to diet prescription , weights and follow up per protocol

## 2018-08-07 NOTE — PROGRESS NOTE ADULT - PROBLEM SELECTOR PROBLEM 3
ABPA (allergic bronchopulmonary aspergillosis)
Hypertension
ABPA (allergic bronchopulmonary aspergillosis)
Hypertension

## 2018-08-07 NOTE — PROGRESS NOTE ADULT - PROBLEM SELECTOR PLAN 8
- c/w Lovenox  - OOB as tolerated

## 2018-08-07 NOTE — PROGRESS NOTE ADULT - NEUROLOGICAL DETAILS
responds to pain/alert and oriented x 3/responds to verbal commands
alert and oriented x 3
normal strength/alert and oriented x 3
alert and oriented x 3

## 2018-08-07 NOTE — PROGRESS NOTE ADULT - PROBLEM SELECTOR PROBLEM 4
Hypertension
DM (diabetes mellitus)
Hypertension
DM (diabetes mellitus)

## 2018-08-08 PROBLEM — I10 ESSENTIAL (PRIMARY) HYPERTENSION: Chronic | Status: ACTIVE | Noted: 2018-07-30

## 2018-08-08 PROBLEM — B44.81 ALLERGIC BRONCHOPULMONARY ASPERGILLOSIS: Chronic | Status: ACTIVE | Noted: 2018-07-30

## 2018-08-08 PROBLEM — J47.9 BRONCHIECTASIS, UNCOMPLICATED: Chronic | Status: ACTIVE | Noted: 2018-07-30

## 2018-08-09 ENCOUNTER — APPOINTMENT (OUTPATIENT)
Dept: PULMONOLOGY | Facility: CLINIC | Age: 63
End: 2018-08-09

## 2018-08-13 ENCOUNTER — APPOINTMENT (OUTPATIENT)
Dept: PULMONOLOGY | Facility: CLINIC | Age: 63
End: 2018-08-13
Payer: MEDICAID

## 2018-08-13 VITALS
RESPIRATION RATE: 16 BRPM | HEART RATE: 83 BPM | WEIGHT: 101 LBS | HEIGHT: 63 IN | TEMPERATURE: 98.6 F | SYSTOLIC BLOOD PRESSURE: 130 MMHG | BODY MASS INDEX: 17.89 KG/M2 | DIASTOLIC BLOOD PRESSURE: 70 MMHG

## 2018-08-13 PROCEDURE — 99214 OFFICE O/P EST MOD 30 MIN: CPT

## 2018-08-20 ENCOUNTER — APPOINTMENT (OUTPATIENT)
Dept: PULMONOLOGY | Facility: CLINIC | Age: 63
End: 2018-08-20

## 2018-08-30 ENCOUNTER — APPOINTMENT (OUTPATIENT)
Dept: PULMONOLOGY | Facility: CLINIC | Age: 63
End: 2018-08-30
Payer: MEDICAID

## 2018-08-30 VITALS
BODY MASS INDEX: 17.89 KG/M2 | OXYGEN SATURATION: 94 % | HEART RATE: 82 BPM | RESPIRATION RATE: 15 BRPM | WEIGHT: 101 LBS | SYSTOLIC BLOOD PRESSURE: 134 MMHG | DIASTOLIC BLOOD PRESSURE: 62 MMHG | HEIGHT: 63 IN | TEMPERATURE: 97.9 F

## 2018-08-30 PROCEDURE — 99214 OFFICE O/P EST MOD 30 MIN: CPT

## 2018-09-10 LAB — ACID FAST STN SPT: SIGNIFICANT CHANGE UP

## 2018-09-13 ENCOUNTER — APPOINTMENT (OUTPATIENT)
Dept: PULMONOLOGY | Facility: CLINIC | Age: 63
End: 2018-09-13

## 2018-09-27 ENCOUNTER — MED ADMIN CHARGE (OUTPATIENT)
Age: 63
End: 2018-09-27

## 2018-09-27 ENCOUNTER — APPOINTMENT (OUTPATIENT)
Dept: PULMONOLOGY | Facility: CLINIC | Age: 63
End: 2018-09-27
Payer: MEDICAID

## 2018-09-27 VITALS
HEART RATE: 77 BPM | RESPIRATION RATE: 18 BRPM | HEIGHT: 63 IN | TEMPERATURE: 98.4 F | BODY MASS INDEX: 18.25 KG/M2 | OXYGEN SATURATION: 95 % | DIASTOLIC BLOOD PRESSURE: 80 MMHG | WEIGHT: 103 LBS | SYSTOLIC BLOOD PRESSURE: 145 MMHG

## 2018-09-27 PROCEDURE — G0008: CPT

## 2018-09-27 PROCEDURE — 99354: CPT

## 2018-09-27 PROCEDURE — 90686 IIV4 VACC NO PRSV 0.5 ML IM: CPT

## 2018-09-27 PROCEDURE — 99215 OFFICE O/P EST HI 40 MIN: CPT | Mod: 25

## 2018-09-27 RX ORDER — ALBUTEROL SULFATE 90 UG/1
108 (90 BASE) AEROSOL, METERED RESPIRATORY (INHALATION)
Qty: 1 | Refills: 5 | Status: DISCONTINUED | COMMUNITY
Start: 2018-09-27 | End: 2018-09-27

## 2018-09-27 RX ORDER — SODIUM CHLORIDE FOR INHALATION 7 %
7 VIAL, NEBULIZER (ML) INHALATION
Qty: 1 | Refills: 5 | Status: DISCONTINUED | COMMUNITY
Start: 2017-12-12 | End: 2018-09-27

## 2018-09-27 RX ORDER — FORMOTEROL FUMARATE DIHYDRATE 20 UG/2ML
20 SOLUTION RESPIRATORY (INHALATION)
Qty: 1 | Refills: 4 | Status: DISCONTINUED | COMMUNITY
Start: 2017-12-05 | End: 2018-09-27

## 2018-09-27 RX ORDER — BUDESONIDE AND FORMOTEROL FUMARATE DIHYDRATE 80; 4.5 UG/1; UG/1
80-4.5 AEROSOL RESPIRATORY (INHALATION)
Refills: 0 | Status: DISCONTINUED | COMMUNITY
End: 2018-09-27

## 2018-09-27 RX ORDER — ELECTROLYTES/DEXTROSE
SOLUTION, ORAL ORAL DAILY
Qty: 30 | Refills: 5 | Status: ACTIVE | COMMUNITY
Start: 2018-09-27 | End: 1900-01-01

## 2018-09-27 RX ORDER — SODIUM CHLORIDE SOLN NEBU 7% 7 %
7 NEBU SOLN INHALATION
Qty: 2 | Refills: 2 | Status: DISCONTINUED | COMMUNITY
Start: 2018-07-03 | End: 2018-09-27

## 2018-10-22 ENCOUNTER — EMERGENCY (EMERGENCY)
Facility: HOSPITAL | Age: 63
LOS: 1 days | Discharge: HOME | End: 2018-10-22
Attending: EMERGENCY MEDICINE | Admitting: HOSPITALIST
Payer: MEDICAID

## 2018-10-22 VITALS
DIASTOLIC BLOOD PRESSURE: 73 MMHG | SYSTOLIC BLOOD PRESSURE: 144 MMHG | TEMPERATURE: 98 F | OXYGEN SATURATION: 95 % | RESPIRATION RATE: 18 BRPM | HEART RATE: 96 BPM

## 2018-10-22 DIAGNOSIS — Z90.49 ACQUIRED ABSENCE OF OTHER SPECIFIED PARTS OF DIGESTIVE TRACT: Chronic | ICD-10-CM

## 2018-10-22 LAB
ALBUMIN SERPL ELPH-MCNC: 3.8 G/DL — SIGNIFICANT CHANGE UP (ref 3.3–5)
ALP SERPL-CCNC: 239 U/L — HIGH (ref 40–120)
ALT FLD-CCNC: 34 U/L — HIGH (ref 4–33)
AST SERPL-CCNC: 45 U/L — HIGH (ref 4–32)
BASE EXCESS BLDV CALC-SCNC: 2 MMOL/L — SIGNIFICANT CHANGE UP
BASOPHILS # BLD AUTO: 0.06 K/UL — SIGNIFICANT CHANGE UP (ref 0–0.2)
BASOPHILS NFR BLD AUTO: 0.6 % — SIGNIFICANT CHANGE UP (ref 0–2)
BILIRUB SERPL-MCNC: 0.3 MG/DL — SIGNIFICANT CHANGE UP (ref 0.2–1.2)
BLOOD GAS VENOUS - CREATININE: 0.39 MG/DL — LOW (ref 0.5–1.3)
BUN SERPL-MCNC: 13 MG/DL — SIGNIFICANT CHANGE UP (ref 7–23)
CALCIUM SERPL-MCNC: 9.6 MG/DL — SIGNIFICANT CHANGE UP (ref 8.4–10.5)
CHLORIDE BLDV-SCNC: 105 MMOL/L — SIGNIFICANT CHANGE UP (ref 96–108)
CHLORIDE SERPL-SCNC: 103 MMOL/L — SIGNIFICANT CHANGE UP (ref 98–107)
CO2 SERPL-SCNC: 26 MMOL/L — SIGNIFICANT CHANGE UP (ref 22–31)
CREAT SERPL-MCNC: 0.53 MG/DL — SIGNIFICANT CHANGE UP (ref 0.5–1.3)
EOSINOPHIL # BLD AUTO: 0.22 K/UL — SIGNIFICANT CHANGE UP (ref 0–0.5)
EOSINOPHIL NFR BLD AUTO: 2.3 % — SIGNIFICANT CHANGE UP (ref 0–6)
GAS PNL BLDV: 142 MMOL/L — SIGNIFICANT CHANGE UP (ref 136–146)
GLUCOSE BLDV-MCNC: 161 — HIGH (ref 70–99)
GLUCOSE SERPL-MCNC: 154 MG/DL — HIGH (ref 70–99)
HCO3 BLDV-SCNC: 25 MMOL/L — SIGNIFICANT CHANGE UP (ref 20–27)
HCT VFR BLD CALC: 34.7 % — SIGNIFICANT CHANGE UP (ref 34.5–45)
HCT VFR BLDV CALC: 32.7 % — LOW (ref 34.5–45)
HGB BLD-MCNC: 10.7 G/DL — LOW (ref 11.5–15.5)
HGB BLDV-MCNC: 10.6 G/DL — LOW (ref 11.5–15.5)
IMM GRANULOCYTES # BLD AUTO: 0.03 # — SIGNIFICANT CHANGE UP
IMM GRANULOCYTES NFR BLD AUTO: 0.3 % — SIGNIFICANT CHANGE UP (ref 0–1.5)
LACTATE BLDV-MCNC: 1 MMOL/L — SIGNIFICANT CHANGE UP (ref 0.5–2)
LYMPHOCYTES # BLD AUTO: 2.34 K/UL — SIGNIFICANT CHANGE UP (ref 1–3.3)
LYMPHOCYTES # BLD AUTO: 24.1 % — SIGNIFICANT CHANGE UP (ref 13–44)
MCHC RBC-ENTMCNC: 23.1 PG — LOW (ref 27–34)
MCHC RBC-ENTMCNC: 30.8 % — LOW (ref 32–36)
MCV RBC AUTO: 74.8 FL — LOW (ref 80–100)
MONOCYTES # BLD AUTO: 0.9 K/UL — SIGNIFICANT CHANGE UP (ref 0–0.9)
MONOCYTES NFR BLD AUTO: 9.3 % — SIGNIFICANT CHANGE UP (ref 2–14)
NEUTROPHILS # BLD AUTO: 6.15 K/UL — SIGNIFICANT CHANGE UP (ref 1.8–7.4)
NEUTROPHILS NFR BLD AUTO: 63.4 % — SIGNIFICANT CHANGE UP (ref 43–77)
NRBC # FLD: 0 — SIGNIFICANT CHANGE UP
PCO2 BLDV: 49 MMHG — SIGNIFICANT CHANGE UP (ref 41–51)
PH BLDV: 7.36 PH — SIGNIFICANT CHANGE UP (ref 7.32–7.43)
PLATELET # BLD AUTO: 195 K/UL — SIGNIFICANT CHANGE UP (ref 150–400)
PMV BLD: SIGNIFICANT CHANGE UP FL (ref 7–13)
PO2 BLDV: 37 MMHG — SIGNIFICANT CHANGE UP (ref 35–40)
POTASSIUM BLDV-SCNC: 3.6 MMOL/L — SIGNIFICANT CHANGE UP (ref 3.4–4.5)
POTASSIUM SERPL-MCNC: 3.8 MMOL/L — SIGNIFICANT CHANGE UP (ref 3.5–5.3)
POTASSIUM SERPL-SCNC: 3.8 MMOL/L — SIGNIFICANT CHANGE UP (ref 3.5–5.3)
PROT SERPL-MCNC: 7.5 G/DL — SIGNIFICANT CHANGE UP (ref 6–8.3)
RBC # BLD: 4.64 M/UL — SIGNIFICANT CHANGE UP (ref 3.8–5.2)
RBC # FLD: 18.2 % — HIGH (ref 10.3–14.5)
SAO2 % BLDV: 61.8 % — SIGNIFICANT CHANGE UP (ref 60–85)
SODIUM SERPL-SCNC: 141 MMOL/L — SIGNIFICANT CHANGE UP (ref 135–145)
WBC # BLD: 9.7 K/UL — SIGNIFICANT CHANGE UP (ref 3.8–10.5)
WBC # FLD AUTO: 9.7 K/UL — SIGNIFICANT CHANGE UP (ref 3.8–10.5)

## 2018-10-22 PROCEDURE — 99285 EMERGENCY DEPT VISIT HI MDM: CPT

## 2018-10-22 RX ORDER — MAGNESIUM SULFATE 500 MG/ML
2 VIAL (ML) INJECTION ONCE
Qty: 0 | Refills: 0 | Status: COMPLETED | OUTPATIENT
Start: 2018-10-22 | End: 2018-10-22

## 2018-10-22 RX ORDER — FAMOTIDINE 10 MG/ML
20 INJECTION INTRAVENOUS ONCE
Qty: 0 | Refills: 0 | Status: COMPLETED | OUTPATIENT
Start: 2018-10-22 | End: 2018-10-22

## 2018-10-22 RX ORDER — SODIUM CHLORIDE 9 MG/ML
1000 INJECTION INTRAMUSCULAR; INTRAVENOUS; SUBCUTANEOUS ONCE
Qty: 0 | Refills: 0 | Status: COMPLETED | OUTPATIENT
Start: 2018-10-22 | End: 2018-10-22

## 2018-10-22 RX ORDER — DIPHENHYDRAMINE HCL 50 MG
50 CAPSULE ORAL ONCE
Qty: 0 | Refills: 0 | Status: COMPLETED | OUTPATIENT
Start: 2018-10-22 | End: 2018-10-22

## 2018-10-22 RX ORDER — IPRATROPIUM/ALBUTEROL SULFATE 18-103MCG
3 AEROSOL WITH ADAPTER (GRAM) INHALATION ONCE
Qty: 0 | Refills: 0 | Status: COMPLETED | OUTPATIENT
Start: 2018-10-22 | End: 2018-10-22

## 2018-10-22 RX ADMIN — Medication 3 MILLILITER(S): at 20:49

## 2018-10-22 RX ADMIN — Medication 50 GRAM(S): at 20:49

## 2018-10-22 RX ADMIN — Medication 50 MILLIGRAM(S): at 21:31

## 2018-10-22 RX ADMIN — Medication 125 MILLIGRAM(S): at 20:49

## 2018-10-22 RX ADMIN — SODIUM CHLORIDE 1000 MILLILITER(S): 9 INJECTION INTRAMUSCULAR; INTRAVENOUS; SUBCUTANEOUS at 20:49

## 2018-10-22 RX ADMIN — FAMOTIDINE 20 MILLIGRAM(S): 10 INJECTION INTRAVENOUS at 21:31

## 2018-10-22 NOTE — ED PROVIDER NOTE - ATTENDING CONTRIBUTION TO CARE
Nan RODRIGUES- 62 Y/O F with h/o bronchopulmonary aspergillosis, bronchiectasis, asthma, diabetes with hb a1c 9.6  p/w persistent cough for for a while and got worse 2 wks ago, pt had mild chills but no fever or weight loss, no recent travel, no hemoptysis. Pt is producing lot of clear phlegm. No nausea, vomiting. Pt had cough induced syncope and is feeling tired and weak    pt is alert, well appearing female, persistent coughing, s1s2 normal reg, b/l profuse wheezing with bronchitic cough,  no jvd, noted swelling under both eyelids- bagging , neuro exam aox3 cn 2-12 intact, no focal deficits, skin warm, dry, good turgor    plan to treat like acute bronchitis, r/o pna, plan to admit for failure of outpatient treatments

## 2018-10-22 NOTE — ED PROVIDER NOTE - OBJECTIVE STATEMENT
64 yo female c pmhx allergic bronchopulmonary asperigillosis, bronchiectasis, DM, HTN presents to ED c/o cough, sob, chest tightness worsening over the past 2 weeks.  Pt follows pulmonologist Dr. Shane, was admitting in July for same.  Pt stopped taking prednisone last week, has been taking albuterol with spacer without improvement.  Pt reports subjective fevers. Saw PCP Dr. Dykes who advised pt to come to ED.  Pt also notes blood sugars have been very high and would drop low.  Denies any CP, abd pain, n/v/d, recent travel, leg swelling.      Translation provided by daughter as per patient's request.

## 2018-10-22 NOTE — ED PROVIDER NOTE - PMH
ABPA (allergic bronchopulmonary aspergillosis)    Bronchiectasis    DM (diabetes mellitus)    Hypertension

## 2018-10-22 NOTE — ED PROVIDER NOTE - PROGRESS NOTE DETAILS
LUZ Mckay: Attempted to call Dr. Dykes, no call back; PCU has no beds, patient admitted to medicine.

## 2018-10-22 NOTE — ED PROVIDER NOTE - MEDICAL DECISION MAKING DETAILS
64 yo female c pmhx allergic bronchopulmonary asperigillosis, bronchiectasis, DM, HTN presents to ED c/o cough, sob, chest tightness worsening over the past 2 weeks.   Pt has diffuse wheezing; will order labs, steroids, mag, duonebs, cxr likely admit to PCU

## 2018-10-23 ENCOUNTER — TRANSCRIPTION ENCOUNTER (OUTPATIENT)
Age: 63
End: 2018-10-23

## 2018-10-23 VITALS
RESPIRATION RATE: 18 BRPM | OXYGEN SATURATION: 97 % | HEART RATE: 86 BPM | DIASTOLIC BLOOD PRESSURE: 82 MMHG | TEMPERATURE: 99 F | SYSTOLIC BLOOD PRESSURE: 157 MMHG

## 2018-10-23 DIAGNOSIS — E55.9 VITAMIN D DEFICIENCY, UNSPECIFIED: ICD-10-CM

## 2018-10-23 DIAGNOSIS — J47.1 BRONCHIECTASIS WITH (ACUTE) EXACERBATION: ICD-10-CM

## 2018-10-23 DIAGNOSIS — R55 SYNCOPE AND COLLAPSE: ICD-10-CM

## 2018-10-23 DIAGNOSIS — I10 ESSENTIAL (PRIMARY) HYPERTENSION: ICD-10-CM

## 2018-10-23 DIAGNOSIS — Z29.9 ENCOUNTER FOR PROPHYLACTIC MEASURES, UNSPECIFIED: ICD-10-CM
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
## 2018-10-23 DIAGNOSIS — B19.20 UNSPECIFIED VIRAL HEPATITIS C WITHOUT HEPATIC COMA: ICD-10-CM

## 2018-10-23 DIAGNOSIS — E11.65 TYPE 2 DIABETES MELLITUS WITH HYPERGLYCEMIA: ICD-10-CM

## 2018-10-23 DIAGNOSIS — D50.9 IRON DEFICIENCY ANEMIA, UNSPECIFIED: ICD-10-CM

## 2018-10-23 LAB
APPEARANCE UR: CLEAR — SIGNIFICANT CHANGE UP
B PERT DNA SPEC QL NAA+PROBE: SIGNIFICANT CHANGE UP
BILIRUB UR-MCNC: NEGATIVE — SIGNIFICANT CHANGE UP
BLOOD UR QL VISUAL: NEGATIVE — SIGNIFICANT CHANGE UP
BUN SERPL-MCNC: 15 MG/DL — SIGNIFICANT CHANGE UP (ref 7–23)
C PNEUM DNA SPEC QL NAA+PROBE: NOT DETECTED — SIGNIFICANT CHANGE UP
CALCIUM SERPL-MCNC: 8.9 MG/DL — SIGNIFICANT CHANGE UP (ref 8.4–10.5)
CHLORIDE SERPL-SCNC: 104 MMOL/L — SIGNIFICANT CHANGE UP (ref 98–107)
CO2 SERPL-SCNC: 22 MMOL/L — SIGNIFICANT CHANGE UP (ref 22–31)
COLOR SPEC: SIGNIFICANT CHANGE UP
CREAT SERPL-MCNC: 0.53 MG/DL — SIGNIFICANT CHANGE UP (ref 0.5–1.3)
FLUAV H1 2009 PAND RNA SPEC QL NAA+PROBE: NOT DETECTED — SIGNIFICANT CHANGE UP
FLUAV H1 RNA SPEC QL NAA+PROBE: NOT DETECTED — SIGNIFICANT CHANGE UP
FLUAV H3 RNA SPEC QL NAA+PROBE: NOT DETECTED — SIGNIFICANT CHANGE UP
FLUAV SUBTYP SPEC NAA+PROBE: SIGNIFICANT CHANGE UP
FLUBV RNA SPEC QL NAA+PROBE: NOT DETECTED — SIGNIFICANT CHANGE UP
GLUCOSE BLDC GLUCOMTR-MCNC: 254 MG/DL — HIGH (ref 70–99)
GLUCOSE SERPL-MCNC: 370 MG/DL — HIGH (ref 70–99)
GLUCOSE UR-MCNC: 500 — HIGH
GRAM STN SPT: SIGNIFICANT CHANGE UP
HADV DNA SPEC QL NAA+PROBE: NOT DETECTED — SIGNIFICANT CHANGE UP
HCOV 229E RNA SPEC QL NAA+PROBE: NOT DETECTED — SIGNIFICANT CHANGE UP
HCOV HKU1 RNA SPEC QL NAA+PROBE: NOT DETECTED — SIGNIFICANT CHANGE UP
HCOV NL63 RNA SPEC QL NAA+PROBE: NOT DETECTED — SIGNIFICANT CHANGE UP
HCOV OC43 RNA SPEC QL NAA+PROBE: NOT DETECTED — SIGNIFICANT CHANGE UP
HCT VFR BLD CALC: 31.4 % — LOW (ref 34.5–45)
HGB BLD-MCNC: 9.6 G/DL — LOW (ref 11.5–15.5)
HMPV RNA SPEC QL NAA+PROBE: NOT DETECTED — SIGNIFICANT CHANGE UP
HPIV1 RNA SPEC QL NAA+PROBE: NOT DETECTED — SIGNIFICANT CHANGE UP
HPIV2 RNA SPEC QL NAA+PROBE: NOT DETECTED — SIGNIFICANT CHANGE UP
HPIV3 RNA SPEC QL NAA+PROBE: NOT DETECTED — SIGNIFICANT CHANGE UP
HPIV4 RNA SPEC QL NAA+PROBE: NOT DETECTED — SIGNIFICANT CHANGE UP
IRON SATN MFR SERPL: 21 UG/DL — LOW (ref 30–160)
IRON SATN MFR SERPL: 296 UG/DL — SIGNIFICANT CHANGE UP (ref 140–530)
KETONES UR-MCNC: NEGATIVE — SIGNIFICANT CHANGE UP
LEUKOCYTE ESTERASE UR-ACNC: NEGATIVE — SIGNIFICANT CHANGE UP
M PNEUMO DNA SPEC QL NAA+PROBE: NOT DETECTED — SIGNIFICANT CHANGE UP
MAGNESIUM SERPL-MCNC: 2.1 MG/DL — SIGNIFICANT CHANGE UP (ref 1.6–2.6)
MCHC RBC-ENTMCNC: 22.6 PG — LOW (ref 27–34)
MCHC RBC-ENTMCNC: 30.6 % — LOW (ref 32–36)
MCV RBC AUTO: 74.1 FL — LOW (ref 80–100)
NITRITE UR-MCNC: NEGATIVE — SIGNIFICANT CHANGE UP
NRBC # FLD: 0 — SIGNIFICANT CHANGE UP
OB PNL STL: NEGATIVE — SIGNIFICANT CHANGE UP
PH UR: 6.5 — SIGNIFICANT CHANGE UP (ref 5–8)
PHOSPHATE SERPL-MCNC: 3.1 MG/DL — SIGNIFICANT CHANGE UP (ref 2.5–4.5)
PLATELET # BLD AUTO: 175 K/UL — SIGNIFICANT CHANGE UP (ref 150–400)
PMV BLD: SIGNIFICANT CHANGE UP FL (ref 7–13)
POTASSIUM SERPL-MCNC: 4.2 MMOL/L — SIGNIFICANT CHANGE UP (ref 3.5–5.3)
POTASSIUM SERPL-SCNC: 4.2 MMOL/L — SIGNIFICANT CHANGE UP (ref 3.5–5.3)
PROCALCITONIN SERPL-MCNC: 0.03 NG/ML — SIGNIFICANT CHANGE UP (ref 0.02–0.1)
PROT UR-MCNC: NEGATIVE — SIGNIFICANT CHANGE UP
RBC # BLD: 4.24 M/UL — SIGNIFICANT CHANGE UP (ref 3.8–5.2)
RBC # FLD: 18 % — HIGH (ref 10.3–14.5)
RBC CASTS # UR COMP ASSIST: SIGNIFICANT CHANGE UP (ref 0–?)
RSV RNA SPEC QL NAA+PROBE: NOT DETECTED — SIGNIFICANT CHANGE UP
RV+EV RNA SPEC QL NAA+PROBE: NOT DETECTED — SIGNIFICANT CHANGE UP
SODIUM SERPL-SCNC: 138 MMOL/L — SIGNIFICANT CHANGE UP (ref 135–145)
SP GR SPEC: 1.01 — SIGNIFICANT CHANGE UP (ref 1–1.04)
SPECIMEN SOURCE: SIGNIFICANT CHANGE UP
TSH SERPL-MCNC: 0.79 UIU/ML — SIGNIFICANT CHANGE UP (ref 0.27–4.2)
UIBC SERPL-MCNC: 274.8 UG/DL — SIGNIFICANT CHANGE UP (ref 110–370)
UROBILINOGEN FLD QL: NORMAL — SIGNIFICANT CHANGE UP
WBC # BLD: 7.35 K/UL — SIGNIFICANT CHANGE UP (ref 3.8–10.5)
WBC # FLD AUTO: 7.35 K/UL — SIGNIFICANT CHANGE UP (ref 3.8–10.5)
WBC UR QL: SIGNIFICANT CHANGE UP (ref 0–?)

## 2018-10-23 PROCEDURE — 93010 ELECTROCARDIOGRAM REPORT: CPT

## 2018-10-23 RX ORDER — DEXTROSE 50 % IN WATER 50 %
25 SYRINGE (ML) INTRAVENOUS ONCE
Qty: 0 | Refills: 0 | Status: DISCONTINUED | OUTPATIENT
Start: 2018-10-23 | End: 2018-10-23

## 2018-10-23 RX ORDER — MONTELUKAST 4 MG/1
10 TABLET, CHEWABLE ORAL AT BEDTIME
Qty: 0 | Refills: 0 | Status: DISCONTINUED | OUTPATIENT
Start: 2018-10-23 | End: 2018-10-23

## 2018-10-23 RX ORDER — INSULIN LISPRO 100/ML
VIAL (ML) SUBCUTANEOUS
Qty: 0 | Refills: 0 | Status: DISCONTINUED | OUTPATIENT
Start: 2018-10-23 | End: 2018-10-23

## 2018-10-23 RX ORDER — INSULIN LISPRO 100/ML
VIAL (ML) SUBCUTANEOUS AT BEDTIME
Qty: 0 | Refills: 0 | Status: DISCONTINUED | OUTPATIENT
Start: 2018-10-23 | End: 2018-10-23

## 2018-10-23 RX ORDER — POLYETHYLENE GLYCOL 3350 17 G/17G
17 POWDER, FOR SOLUTION ORAL DAILY
Qty: 0 | Refills: 0 | Status: DISCONTINUED | OUTPATIENT
Start: 2018-10-23 | End: 2018-10-23

## 2018-10-23 RX ORDER — FLUTICASONE PROPIONATE 50 MCG
1 SPRAY, SUSPENSION NASAL
Qty: 0 | Refills: 0 | Status: DISCONTINUED | OUTPATIENT
Start: 2018-10-23 | End: 2018-10-23

## 2018-10-23 RX ORDER — SODIUM CHLORIDE 0.65 %
1 AEROSOL, SPRAY (ML) NASAL
Qty: 15 | Refills: 0 | OUTPATIENT
Start: 2018-10-23 | End: 2018-11-05

## 2018-10-23 RX ORDER — IPRATROPIUM/ALBUTEROL SULFATE 18-103MCG
3 AEROSOL WITH ADAPTER (GRAM) INHALATION EVERY 6 HOURS
Qty: 0 | Refills: 0 | Status: DISCONTINUED | OUTPATIENT
Start: 2018-10-23 | End: 2018-10-23

## 2018-10-23 RX ORDER — DEXTROSE 50 % IN WATER 50 %
15 SYRINGE (ML) INTRAVENOUS ONCE
Qty: 0 | Refills: 0 | Status: DISCONTINUED | OUTPATIENT
Start: 2018-10-23 | End: 2018-10-23

## 2018-10-23 RX ORDER — FOLIC ACID 0.8 MG
1 TABLET ORAL DAILY
Qty: 0 | Refills: 0 | Status: DISCONTINUED | OUTPATIENT
Start: 2018-10-23 | End: 2018-10-23

## 2018-10-23 RX ORDER — DOCUSATE SODIUM 100 MG
100 CAPSULE ORAL THREE TIMES A DAY
Qty: 0 | Refills: 0 | Status: DISCONTINUED | OUTPATIENT
Start: 2018-10-23 | End: 2018-10-23

## 2018-10-23 RX ORDER — BUDESONIDE AND FORMOTEROL FUMARATE DIHYDRATE 160; 4.5 UG/1; UG/1
2 AEROSOL RESPIRATORY (INHALATION)
Qty: 0 | Refills: 0 | Status: DISCONTINUED | OUTPATIENT
Start: 2018-10-23 | End: 2018-10-23

## 2018-10-23 RX ORDER — DEXTROSE 50 % IN WATER 50 %
12.5 SYRINGE (ML) INTRAVENOUS ONCE
Qty: 0 | Refills: 0 | Status: DISCONTINUED | OUTPATIENT
Start: 2018-10-23 | End: 2018-10-23

## 2018-10-23 RX ORDER — ACETAMINOPHEN 500 MG
325 TABLET ORAL EVERY 4 HOURS
Qty: 0 | Refills: 0 | Status: DISCONTINUED | OUTPATIENT
Start: 2018-10-23 | End: 2018-10-23

## 2018-10-23 RX ORDER — GLUCAGON INJECTION, SOLUTION 0.5 MG/.1ML
1 INJECTION, SOLUTION SUBCUTANEOUS ONCE
Qty: 0 | Refills: 0 | Status: DISCONTINUED | OUTPATIENT
Start: 2018-10-23 | End: 2018-10-23

## 2018-10-23 RX ORDER — ENOXAPARIN SODIUM 100 MG/ML
40 INJECTION SUBCUTANEOUS EVERY 24 HOURS
Qty: 0 | Refills: 0 | Status: DISCONTINUED | OUTPATIENT
Start: 2018-10-23 | End: 2018-10-23

## 2018-10-23 RX ORDER — ACETAMINOPHEN 500 MG
2 TABLET ORAL
Qty: 0 | Refills: 0 | DISCHARGE
Start: 2018-10-23

## 2018-10-23 RX ORDER — INSULIN GLARGINE 100 [IU]/ML
20 INJECTION, SOLUTION SUBCUTANEOUS EVERY MORNING
Qty: 0 | Refills: 0 | Status: DISCONTINUED | OUTPATIENT
Start: 2018-10-23 | End: 2018-10-23

## 2018-10-23 RX ORDER — KETOTIFEN FUMARATE 0.34 MG/ML
1 SOLUTION OPHTHALMIC
Qty: 0 | Refills: 0 | Status: DISCONTINUED | OUTPATIENT
Start: 2018-10-23 | End: 2018-10-23

## 2018-10-23 RX ORDER — SENNA PLUS 8.6 MG/1
2 TABLET ORAL AT BEDTIME
Qty: 0 | Refills: 0 | Status: DISCONTINUED | OUTPATIENT
Start: 2018-10-23 | End: 2018-10-23

## 2018-10-23 RX ORDER — PANTOPRAZOLE SODIUM 20 MG/1
1 TABLET, DELAYED RELEASE ORAL
Qty: 0 | Refills: 0 | COMMUNITY

## 2018-10-23 RX ORDER — BUDESONIDE AND FORMOTEROL FUMARATE DIHYDRATE 160; 4.5 UG/1; UG/1
2 AEROSOL RESPIRATORY (INHALATION)
Qty: 0 | Refills: 0 | COMMUNITY

## 2018-10-23 RX ORDER — SODIUM CHLORIDE 9 MG/ML
1000 INJECTION, SOLUTION INTRAVENOUS
Qty: 0 | Refills: 0 | Status: DISCONTINUED | OUTPATIENT
Start: 2018-10-23 | End: 2018-10-23

## 2018-10-23 RX ADMIN — Medication 1 MILLIGRAM(S): at 13:27

## 2018-10-23 RX ADMIN — KETOTIFEN FUMARATE 1 DROP(S): 0.34 SOLUTION OPHTHALMIC at 18:39

## 2018-10-23 RX ADMIN — Medication 1 SPRAY(S): at 18:40

## 2018-10-23 RX ADMIN — Medication 325 MILLIGRAM(S): at 08:10

## 2018-10-23 RX ADMIN — Medication 100 MILLIGRAM(S): at 13:32

## 2018-10-23 RX ADMIN — Medication 600 MILLIGRAM(S): at 07:10

## 2018-10-23 RX ADMIN — Medication 600 MILLIGRAM(S): at 19:45

## 2018-10-23 RX ADMIN — Medication 1 TABLET(S): at 18:42

## 2018-10-23 RX ADMIN — Medication 1 TABLET(S): at 13:27

## 2018-10-23 RX ADMIN — BUDESONIDE AND FORMOTEROL FUMARATE DIHYDRATE 2 PUFF(S): 160; 4.5 AEROSOL RESPIRATORY (INHALATION) at 09:07

## 2018-10-23 RX ADMIN — Medication 100 MILLIGRAM(S): at 07:04

## 2018-10-23 RX ADMIN — ENOXAPARIN SODIUM 40 MILLIGRAM(S): 100 INJECTION SUBCUTANEOUS at 13:32

## 2018-10-23 RX ADMIN — Medication 3 MILLILITER(S): at 11:18

## 2018-10-23 RX ADMIN — INSULIN GLARGINE 20 UNIT(S): 100 INJECTION, SOLUTION SUBCUTANEOUS at 13:27

## 2018-10-23 RX ADMIN — Medication 2: at 09:07

## 2018-10-23 RX ADMIN — Medication 3: at 13:26

## 2018-10-23 RX ADMIN — Medication 2: at 18:39

## 2018-10-23 RX ADMIN — Medication 325 MILLIGRAM(S): at 07:10

## 2018-10-23 RX ADMIN — KETOTIFEN FUMARATE 1 DROP(S): 0.34 SOLUTION OPHTHALMIC at 07:04

## 2018-10-23 RX ADMIN — Medication 1 SPRAY(S): at 07:05

## 2018-10-23 NOTE — PROGRESS NOTE ADULT - ATTENDING COMMENTS
Patient seen and examined at bedside this afternoon. Spoke with pt's daughter, Brian on the phone as well. Pt complaining of persistent productive cough, but denies SOB or chest pain. Not in respiratory distress. Pt observed smiling in bed and eating without any issues. Afebrile and without hypoxia. Lung exam notable to diffuse low pitched expiratory wheeze at lung base, particularly at left lung base and diffuse rhonchi. Pt evaluated by pulm. Spoke with pulm attending and fellow at bedside and are in agreement with recs.     Ms. Sky is a 63-year-old woman with longstanding hx of chronic bronchiectasis with multiple exacerbations, prior hx of ABPA, insulin dependent DM2 admitted for acute bronchiectasis exacerbation, found to have stable microcytic anemia, elevated transaminases with benign abdominal exam, and hyperglycemia in the setting of suboptimal DM2 (HbA1c 9.1% as of July 2018)  - agree with 2 week course of Bactrim given hx of Stenotrophomonas  - continue home regimen of Symbicort, Aerobika device or Acapella.  - no indication for oral steroids at this time  - will obtain sample for AFB cx to be f/u as an outpatient by pulm  - reviewed OSH lab test results. Pt HCV Ab positive, but HCV RNA not detected, suggested of prior HCV exposure but not active infection  - pt to f/u with PMD regarding better DM2 management  Patient medically stable to be discharged home today with close outpatient pulm follow-up. Spoke with pt's daughter, Brian, who was in agreement of discharge plan. Spent 36 min coordinating discharge plan and counseling pt and her daughter regarding her condition and her post-discharge care.    Remainder of plan as discussed above by Dr. Eagle

## 2018-10-23 NOTE — DISCHARGE NOTE ADULT - PLAN OF CARE
Follow up with Dr. Dacosta within one week of discharge You were treated for an exacerbation of bronchiectasis. You were started on an antibiotic called Bactrim which you will take two times a day for two weeks. You are advised to continue to use Symbicort as directed. Your diabetes is very poorly controlled. You MUST see a primary care doctor within 1 week of discharge for follow up and monitor your blood sugar on a daily basis. Please inject 20u lantus everyday for diabetes and talk to your doctor about restarting your metformin. Please resume taking your losartan. Your blood pressure is very elevated in hospital and you need to be on a medication for that.

## 2018-10-23 NOTE — CONSULT NOTE ADULT - SUBJECTIVE AND OBJECTIVE BOX
CHIEF COMPLAINT:    HPI:    PAST MEDICAL & SURGICAL HISTORY:  Hypertension  Bronchiectasis  ABPA (allergic bronchopulmonary aspergillosis)  DM (diabetes mellitus)  History of cholecystectomy      FAMILY HISTORY:  No pertinent family history in first degree relatives      SOCIAL HISTORY:  Smoking: [ ] Never Smoked [ ] Former Smoker (__ packs x ___ years) [ ] Current Smoker  (__ packs x ___ years)  Substance Use: [ ] Never Used [ ] Used ____  EtOH Use:  Marital Status: [ ] Single [ ]  [ ]  [ ]   Sexual History:   Occupation:  Recent Travel:  Country of Birth:  Advance Directives:    Allergies    No Known Allergies    Intolerances        HOME MEDICATIONS:    REVIEW OF SYSTEMS:  Constitutional: [ ] negative [ ] fevers [ ] chills [ ] weight loss [ ] weight gain  HEENT: [ ] negative [ ] dry eyes [ ] eye irritation [ ] postnasal drip [ ] nasal congestion  CV: [ ] negative  [ ] chest pain [ ] orthopnea [ ] palpitations [ ] murmur  Resp: [ ] negative [ ] cough [ ] shortness of breath [ ] dyspnea [ ] wheezing [ ] sputum [ ] hemoptysis  GI: [ ] negative [ ] nausea [ ] vomiting [ ] diarrhea [ ] constipation [ ] abd pain [ ] dysphagia   : [ ] negative [ ] dysuria [ ] nocturia [ ] hematuria [ ] increased urinary frequency  Musculoskeletal: [ ] negative [ ] back pain [ ] myalgias [ ] arthralgias [ ] fracture  Skin: [ ] negative [ ] rash [ ] itch  Neurological: [ ] negative [ ] headache [ ] dizziness [ ] syncope [ ] weakness [ ] numbness  Psychiatric: [ ] negative [ ] anxiety [ ] depression  Endocrine: [ ] negative [ ] diabetes [ ] thyroid problem  Hematologic/Lymphatic: [ ] negative [ ] anemia [ ] bleeding problem  Allergic/Immunologic: [ ] negative [ ] itchy eyes [ ] nasal discharge [ ] hives [ ] angioedema  [ ] All other systems negative  [ ] Unable to assess ROS because ________    OBJECTIVE:  ICU Vital Signs Last 24 Hrs  T(C): 36.7 (23 Oct 2018 06:58), Max: 36.9 (22 Oct 2018 17:28)  T(F): 98 (23 Oct 2018 06:58), Max: 98.4 (22 Oct 2018 17:28)  HR: 90 (23 Oct 2018 01:57) (89 - 96)  BP: 146/79 (23 Oct 2018 01:57) (144/73 - 150/84)  BP(mean): --  ABP: --  ABP(mean): --  RR: 18 (23 Oct 2018 01:57) (18 - 18)  SpO2: 96% (23 Oct 2018 01:57) (95% - 97%)        CAPILLARY BLOOD GLUCOSE      POCT Blood Glucose.: 246 mg/dL (23 Oct 2018 08:33)      PHYSICAL EXAM:  General:   HEENT:   Lymph Nodes:  Neck:   Respiratory:   Cardiovascular:   Abdomen:   Extremities:   Skin:   Neurological:  Psychiatry:    HOSPITAL MEDICATIONS:  enoxaparin Injectable 40 milliGRAM(s) SubCutaneous every 24 hours        dextrose 40% Gel 15 Gram(s) Oral once PRN  dextrose 50% Injectable 12.5 Gram(s) IV Push once  dextrose 50% Injectable 25 Gram(s) IV Push once  dextrose 50% Injectable 25 Gram(s) IV Push once  glucagon  Injectable 1 milliGRAM(s) IntraMuscular once PRN  insulin glargine Injectable (LANTUS) 20 Unit(s) SubCutaneous every morning  insulin lispro (HumaLOG) corrective regimen sliding scale   SubCutaneous three times a day before meals  insulin lispro (HumaLOG) corrective regimen sliding scale   SubCutaneous at bedtime    ALBUTerol/ipratropium for Nebulization 3 milliLiter(s) Nebulizer every 6 hours  buDESOnide 160 MICROgram(s)/formoterol 4.5 MICROgram(s) Inhaler 2 Puff(s) Inhalation two times a day  guaiFENesin  milliGRAM(s) Oral every 12 hours  montelukast 10 milliGRAM(s) Oral at bedtime    acetaminophen   Tablet .. 325 milliGRAM(s) Oral every 4 hours PRN    docusate sodium 100 milliGRAM(s) Oral three times a day  polyethylene glycol 3350 17 Gram(s) Oral daily PRN  senna 2 Tablet(s) Oral at bedtime PRN        calcium carbonate 1250 mG  + Vitamin D (OsCal 500 + D) 1 Tablet(s) Oral daily  dextrose 5%. 1000 milliLiter(s) IV Continuous <Continuous>  folic acid 1 milliGRAM(s) Oral daily      fluticasone propionate 50 MICROgram(s)/spray Nasal Spray 1 Spray(s) Both Nostrils two times a day  ketotifen 0.025% Ophthalmic Solution 1 Drop(s) Both EYES two times a day        LABS:                        9.6    7.35  )-----------( 175      ( 23 Oct 2018 05:15 )             31.4     Hgb Trend: 9.6<--, 10.7<--  10-23    138  |  104  |  15  ----------------------------<  370<H>  4.2   |  22  |  0.53    Ca    8.9      23 Oct 2018 05:15  Phos  3.1     10-23  Mg     2.1     10-23    TPro  7.5  /  Alb  3.8  /  TBili  0.3  /  DBili  x   /  AST  45<H>  /  ALT  34<H>  /  AlkPhos  239<H>  10-22    Creatinine Trend: 0.53<--, 0.53<--    Urinalysis Basic - ( 23 Oct 2018 05:10 )    Color: LIGHT YELLOW / Appearance: CLEAR / S.012 / pH: 6.5  Gluc: 500 / Ketone: NEGATIVE  / Bili: NEGATIVE / Urobili: NORMAL   Blood: NEGATIVE / Protein: NEGATIVE / Nitrite: NEGATIVE   Leuk Esterase: NEGATIVE / RBC: 0-2 / WBC 0-2   Sq Epi: x / Non Sq Epi: x / Bacteria: x        Venous Blood Gas:  10-22 @ 20:47  7.36/49/37/25/61.8  VBG Lactate: 1.0      MICROBIOLOGY:     RADIOLOGY:  [ ] Reviewed and interpreted by me    PULMONARY FUNCTION TESTS:    EKG: CHIEF COMPLAINT:    HPI: 63F with DM2, HTN, ABPA,  cystic bronchiectasis with negative cystic fibrosis workup, negative vasculitis workup and negative sputum for AFB but 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    PAST MEDICAL & SURGICAL HISTORY:  Hypertension  Bronchiectasis  ABPA (allergic bronchopulmonary aspergillosis)  DM (diabetes mellitus)  History of cholecystectomy      FAMILY HISTORY:  No pertinent family history in first degree relatives      SOCIAL HISTORY:  Smoking: [ ] Never Smoked [ ] Former Smoker (__ packs x ___ years) [ ] Current Smoker  (__ packs x ___ years)  Substance Use: [ ] Never Used [ ] Used ____  EtOH Use:  Marital Status: [ ] Single [ ]  [ ]  [ ]   Sexual History:   Occupation:  Recent Travel:  Country of Birth:  Advance Directives:    Allergies    No Known Allergies    Intolerances        HOME MEDICATIONS:    REVIEW OF SYSTEMS:  Constitutional: [ ] negative [ ] fevers [ ] chills [ ] weight loss [ ] weight gain  HEENT: [ ] negative [ ] dry eyes [ ] eye irritation [ ] postnasal drip [ ] nasal congestion  CV: [ ] negative  [ ] chest pain [ ] orthopnea [ ] palpitations [ ] murmur  Resp: [ ] negative [ ] cough [ ] shortness of breath [ ] dyspnea [ ] wheezing [ ] sputum [ ] hemoptysis  GI: [ ] negative [ ] nausea [ ] vomiting [ ] diarrhea [ ] constipation [ ] abd pain [ ] dysphagia   : [ ] negative [ ] dysuria [ ] nocturia [ ] hematuria [ ] increased urinary frequency  Musculoskeletal: [ ] negative [ ] back pain [ ] myalgias [ ] arthralgias [ ] fracture  Skin: [ ] negative [ ] rash [ ] itch  Neurological: [ ] negative [ ] headache [ ] dizziness [ ] syncope [ ] weakness [ ] numbness  Psychiatric: [ ] negative [ ] anxiety [ ] depression  Endocrine: [ ] negative [ ] diabetes [ ] thyroid problem  Hematologic/Lymphatic: [ ] negative [ ] anemia [ ] bleeding problem  Allergic/Immunologic: [ ] negative [ ] itchy eyes [ ] nasal discharge [ ] hives [ ] angioedema  [ ] All other systems negative  [ ] Unable to assess ROS because ________    OBJECTIVE:  T(C): 36.7 (23 Oct 2018 06:58), Max: 36.9 (22 Oct 2018 17:28)  T(F): 98 (23 Oct 2018 06:58), Max: 98.4 (22 Oct 2018 17:28)  HR: 90 (23 Oct 2018 01:57) (89 - 96)  BP: 146/79 (23 Oct 2018 01:57) (144/73 - 150/84)  RR: 18 (23 Oct 2018 01:57) (18 - 18)  SpO2: 96% (23 Oct 2018 01:57) (95% - 97%)        CAPILLARY BLOOD GLUCOSE      POCT Blood Glucose.: 246 mg/dL (23 Oct 2018 08:33)      PHYSICAL EXAM:  General:   HEENT:   Lymph Nodes:  Neck:   Respiratory:   Cardiovascular:   Abdomen:   Extremities:   Skin:   Neurological:  Psychiatry:    HOSPITAL MEDICATIONS:  enoxaparin Injectable 40 milliGRAM(s) SubCutaneous every 24 hours        dextrose 40% Gel 15 Gram(s) Oral once PRN  dextrose 50% Injectable 12.5 Gram(s) IV Push once  dextrose 50% Injectable 25 Gram(s) IV Push once  dextrose 50% Injectable 25 Gram(s) IV Push once  glucagon  Injectable 1 milliGRAM(s) IntraMuscular once PRN  insulin glargine Injectable (LANTUS) 20 Unit(s) SubCutaneous every morning  insulin lispro (HumaLOG) corrective regimen sliding scale   SubCutaneous three times a day before meals  insulin lispro (HumaLOG) corrective regimen sliding scale   SubCutaneous at bedtime    ALBUTerol/ipratropium for Nebulization 3 milliLiter(s) Nebulizer every 6 hours  buDESOnide 160 MICROgram(s)/formoterol 4.5 MICROgram(s) Inhaler 2 Puff(s) Inhalation two times a day  guaiFENesin  milliGRAM(s) Oral every 12 hours  montelukast 10 milliGRAM(s) Oral at bedtime    acetaminophen   Tablet .. 325 milliGRAM(s) Oral every 4 hours PRN    docusate sodium 100 milliGRAM(s) Oral three times a day  polyethylene glycol 3350 17 Gram(s) Oral daily PRN  senna 2 Tablet(s) Oral at bedtime PRN        calcium carbonate 1250 mG  + Vitamin D (OsCal 500 + D) 1 Tablet(s) Oral daily  dextrose 5%. 1000 milliLiter(s) IV Continuous <Continuous>  folic acid 1 milliGRAM(s) Oral daily      fluticasone propionate 50 MICROgram(s)/spray Nasal Spray 1 Spray(s) Both Nostrils two times a day  ketotifen 0.025% Ophthalmic Solution 1 Drop(s) Both EYES two times a day        LABS:                        9.6    7.35  )-----------( 175      ( 23 Oct 2018 05:15 )             31.4     Hgb Trend: 9.6<--, 10.7<--  1023    138  |  104  |  15  ----------------------------<  370<H>  4.2   |  22  |  0.53    Ca    8.9      23 Oct 2018 05:15  Phos  3.1     10-23  Mg     2.1     10-23    TPro  7.5  /  Alb  3.8  /  TBili  0.3  /  DBili  x   /  AST  45<H>  /  ALT  34<H>  /  AlkPhos  239<H>  10-22    Creatinine Trend: 0.53<--, 0.53<--    Urinalysis Basic - ( 23 Oct 2018 05:10 )    Color: LIGHT YELLOW / Appearance: CLEAR / S.012 / pH: 6.5  Gluc: 500 / Ketone: NEGATIVE  / Bili: NEGATIVE / Urobili: NORMAL   Blood: NEGATIVE / Protein: NEGATIVE / Nitrite: NEGATIVE   Leuk Esterase: NEGATIVE / RBC: 0-2 / WBC 0-2   Sq Epi: x / Non Sq Epi: x / Bacteria: x        Venous Blood Gas:  10-22 @ 20:47  7.36/49/37/25/61.8  VBG Lactate: 1.0      MICROBIOLOGY:     RADIOLOGY:  [x ] Reviewed and interpreted by me    2018: CT chest:     Bilateral mild areas of bronchiectasis or bronchial wall thickening are   stable since 2017 and correspond to the patient's given   history of allergic bronchopulmonary aspergillosis. Bilateral   superimposed tree-in-bud opacities or small nodules related to foci of   mucoid impacted distal dilated airways are unchanged.   Previously described right upper lobe peripheral cyst demonstrates   interval decrease in size.    CXR: This admission  No change from prior.    PULMONARY FUNCTION TESTS:  2017: Obstructive ventilatory lung defect with FEVI of 39%, FVC of 45% and ration of 69% with DLCO of 66% of predictaed showing mild decrease in DLCO.    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 Patient reports low grade temperatures at home. She started having increased sputum production and cough daily and nightly. She was prescribed a course of steroids and 5 days of Levofloxacin in the past 2 weeks. It improved her symptoms for a week and then they recurred.     PAST MEDICAL & SURGICAL HISTORY:  Hypertension  Bronchiectasis  ABPA (allergic bronchopulmonary aspergillosis)  DM (diabetes mellitus)  History of cholecystectomy      FAMILY HISTORY:  No pertinent family history in first degree relatives      SOCIAL HISTORY:  Smoking: [ ] Never Smoked [ ] Former Smoker (__ packs x ___ years) [ ] Current Smoker  (__ packs x ___ years)  Substance Use: [ ] Never Used [ ] Used ____  EtOH Use:  Marital Status: [ ] Single [ ]  [ ]  [ ]   Sexual History:   Occupation:  Recent Travel:  Country of Birth:  Advance Directives:    Allergies    No Known Allergies    Intolerances        HOME MEDICATIONS:    REVIEW OF SYSTEMS:  Constitutional: [ ] negative [ ] fevers [ ] chills [ ] weight loss [ ] weight gain  HEENT: [ ] negative [ ] dry eyes [ ] eye irritation [ ] postnasal drip [ ] nasal congestion  CV: [ ] negative  [ ] chest pain [ ] orthopnea [ ] palpitations [ ] murmur  Resp: [ ] negative [ ] cough [ ] shortness of breath [ ] dyspnea [ ] wheezing [ ] sputum [ ] hemoptysis  GI: [ ] negative [ ] nausea [ ] vomiting [ ] diarrhea [ ] constipation [ ] abd pain [ ] dysphagia   : [ ] negative [ ] dysuria [ ] nocturia [ ] hematuria [ ] increased urinary frequency  Musculoskeletal: [ ] negative [ ] back pain [ ] myalgias [ ] arthralgias [ ] fracture  Skin: [ ] negative [ ] rash [ ] itch  Neurological: [ ] negative [ ] headache [ ] dizziness [ ] syncope [ ] weakness [ ] numbness  Psychiatric: [ ] negative [ ] anxiety [ ] depression  Endocrine: [ ] negative [ ] diabetes [ ] thyroid problem  Hematologic/Lymphatic: [ ] negative [ ] anemia [ ] bleeding problem  Allergic/Immunologic: [ ] negative [ ] itchy eyes [ ] nasal discharge [ ] hives [ ] angioedema  [ ] All other systems negative  [ ] Unable to assess ROS because ________    OBJECTIVE:  T(C): 36.7 (23 Oct 2018 06:58), Max: 36.9 (22 Oct 2018 17:28)  T(F): 98 (23 Oct 2018 06:58), Max: 98.4 (22 Oct 2018 17:28)  HR: 90 (23 Oct 2018 01:57) (89 - 96)  BP: 146/79 (23 Oct 2018 01:57) (144/73 - 150/84)  RR: 18 (23 Oct 2018 01:57) (18 - 18)  SpO2: 96% (23 Oct 2018 01:57) (95% - 97%)        CAPILLARY BLOOD GLUCOSE      POCT Blood Glucose.: 246 mg/dL (23 Oct 2018 08:33)      PHYSICAL EXAM:  General:   HEENT:   Lymph Nodes:  Neck:   Respiratory:   Cardiovascular:   Abdomen:   Extremities:   Skin:   Neurological:  Psychiatry:    HOSPITAL MEDICATIONS:  enoxaparin Injectable 40 milliGRAM(s) SubCutaneous every 24 hours        dextrose 40% Gel 15 Gram(s) Oral once PRN  dextrose 50% Injectable 12.5 Gram(s) IV Push once  dextrose 50% Injectable 25 Gram(s) IV Push once  dextrose 50% Injectable 25 Gram(s) IV Push once  glucagon  Injectable 1 milliGRAM(s) IntraMuscular once PRN  insulin glargine Injectable (LANTUS) 20 Unit(s) SubCutaneous every morning  insulin lispro (HumaLOG) corrective regimen sliding scale   SubCutaneous three times a day before meals  insulin lispro (HumaLOG) corrective regimen sliding scale   SubCutaneous at bedtime    ALBUTerol/ipratropium for Nebulization 3 milliLiter(s) Nebulizer every 6 hours  buDESOnide 160 MICROgram(s)/formoterol 4.5 MICROgram(s) Inhaler 2 Puff(s) Inhalation two times a day  guaiFENesin  milliGRAM(s) Oral every 12 hours  montelukast 10 milliGRAM(s) Oral at bedtime    acetaminophen   Tablet .. 325 milliGRAM(s) Oral every 4 hours PRN    docusate sodium 100 milliGRAM(s) Oral three times a day  polyethylene glycol 3350 17 Gram(s) Oral daily PRN  senna 2 Tablet(s) Oral at bedtime PRN        calcium carbonate 1250 mG  + Vitamin D (OsCal 500 + D) 1 Tablet(s) Oral daily  dextrose 5%. 1000 milliLiter(s) IV Continuous <Continuous>  folic acid 1 milliGRAM(s) Oral daily      fluticasone propionate 50 MICROgram(s)/spray Nasal Spray 1 Spray(s) Both Nostrils two times a day  ketotifen 0.025% Ophthalmic Solution 1 Drop(s) Both EYES two times a day        LABS:                        9.6    7.35  )-----------( 175      ( 23 Oct 2018 05:15 )             31.4     Hgb Trend: 9.6<--, 10.7<--  10    138  |  104  |  15  ----------------------------<  370<H>  4.2   |  22  |  0.53    Ca    8.9      23 Oct 2018 05:15  Phos  3.1     10-23  Mg     2.1     10-23    TPro  7.5  /  Alb  3.8  /  TBili  0.3  /  DBili  x   /  AST  45<H>  /  ALT  34<H>  /  AlkPhos  239<H>  10-22    Creatinine Trend: 0.53<--, 0.53<--    Urinalysis Basic - ( 23 Oct 2018 05:10 )    Color: LIGHT YELLOW / Appearance: CLEAR / S.012 / pH: 6.5  Gluc: 500 / Ketone: NEGATIVE  / Bili: NEGATIVE / Urobili: NORMAL   Blood: NEGATIVE / Protein: NEGATIVE / Nitrite: NEGATIVE   Leuk Esterase: NEGATIVE / RBC: 0-2 / WBC 0-2   Sq Epi: x / Non Sq Epi: x / Bacteria: x        Venous Blood Gas:  10-22 @ 20:47  7.36/49/37/25/61.8  VBG Lactate: 1.0      MICROBIOLOGY:     RADIOLOGY:  [x ] Reviewed and interpreted by me    2018: CT chest:     Bilateral mild areas of bronchiectasis or bronchial wall thickening are   stable since 2017 and correspond to the patient's given   history of allergic bronchopulmonary aspergillosis. Bilateral   superimposed tree-in-bud opacities or small nodules related to foci of   mucoid impacted distal dilated airways are unchanged.   Previously described right upper lobe peripheral cyst demonstrates   interval decrease in size.    CXR: This admission  No change from prior.    PULMONARY FUNCTION TESTS:  2017: Obstructive ventilatory lung defect with FEVI of 39%, FVC of 45% and ration of 69% with DLCO of 66% of predictaed showing mild decrease in DLCO.    EKG:

## 2018-10-23 NOTE — H&P ADULT - PROBLEM SELECTOR PLAN 2
in setting of shortness of breath/difficulty breathing  -will try to change to floor w/continuous pulse ox monitoring  -plan as above fall precautions, PT consult  check orthostatics  encouraged PO intake  EKG ordered/pending

## 2018-10-23 NOTE — ED ADULT NURSE NOTE - OBJECTIVE STATEMENT
Pt. received in results waiting, primarily Leandro speaking with comprehension of English, A&Ox3, ambulatory, with 22 gauge IV in right ac. No acute distress at present. Respirations are even and unlabored on room air. Pt. denies chest pain, abdominal pain, SOB, n/v/d, fever, chills, dizziness, weakness. Pt. is admitted to Medicine, report given to [facility] JOSUE Angela. Pt. transported to [facility].

## 2018-10-23 NOTE — H&P ADULT - PROBLEM SELECTOR PLAN 5
(+)HCV on outpatient testing  stable transaminitis  had some elevated tumor markers on outpatient testing, call PMD in AM to clarify w/u, any prior treatment for HCV? (+)HCV on outpatient testing, per , patient was undergoing work-up for possible malignancy  stable transaminitis  had some elevated tumor markers on outpatient testing, call PMD in AM to clarify w/u, any prior treatment for HCV?

## 2018-10-23 NOTE — DISCHARGE NOTE ADULT - PATIENT PORTAL LINK FT
You can access the [facility], offered by [facility], by registering with the following website: [email or web address]

## 2018-10-23 NOTE — DISCHARGE NOTE ADULT - CONDITIONS AT DISCHARGE
Patient is stable for discharge , alert and partial assistances was provided to her.  She is encouraged to deep breath and cough when ever possible .  She has no c/o SOB or Chest Pain.  Discharge Instructions are discussed with her and Family at bedside.

## 2018-10-23 NOTE — DISCHARGE NOTE ADULT - MEDICATION SUMMARY - MEDICATIONS TO STOP TAKING
I will STOP taking the medications listed below when I get home from the hospital:    aspirin 81 mg oral tablet, chewable  -- 1 tab(s) by mouth once a day    predniSONE 10 mg oral tablet  -- 4 tab(s) by mouth once a day for 4 days then 3 tabs oral daily for 4 days then 2 tabs oral daily for 4 days the 1 tab oral daily for 4 days40  -- It is very important that you take or use this exactly as directed.  Do not skip doses or discontinue unless directed by your doctor.  Obtain medical advice before taking any non-prescription drugs as some may affect the action of this medication.  Take with food or milk.    montelukast 10 mg oral tablet  -- 1 tab(s) by mouth once a day (at bedtime)    fluticasone 50 mcg/inh inhalation powder  -- 1 puff(s) inhaled 2 times a day

## 2018-10-23 NOTE — H&P ADULT - PROBLEM SELECTOR PLAN 1
s/p solumedrol 125mg IV x1 in ED, will restart on prednisone   c/w duonebs ATC  c/w symbicord, Breo, montelukast, mucinex   chest PT, c/w Aerobika, incentive spirometry   pulm consult in AM  check RVP  follow up official read of CXR s/p solumedrol 125mg IV x1 in ED, will restart on prednisone   c/w duonebs ATC  c/w symbicort, Breo, montelukast, mucinex   chest PT, c/w Aerobika, incentive spirometry   pulm consult in AM  check RVP  follow up official read of CXR s/p solumedrol 125mg IV x1 in ED, minimal wheezing on exam, will hold off on further prednisone at present, re-eval in AM  c/w duonebs ATC  c/w symbicort, montelukast, mucinex   chest PT, c/w Aerobika, incentive spirometry, 3%NS neb BID  pulm consult in AM  RVP negative  follow up official read of CXR

## 2018-10-23 NOTE — DISCHARGE NOTE ADULT - MEDICATION SUMMARY - MEDICATIONS TO CHANGE
I will SWITCH the dose or number of times a day I take the medications listed below when I get home from the hospital:    Basaglar KwikPen 100 units/mL subcutaneous solution  -- 25 unit(s) subcutaneous once a day (at bedtime)   -- Do not drink alcoholic beverages when taking this medication.  It is very important that you take or use this exactly as directed.  Do not skip doses or discontinue unless directed by your doctor.  Keep in refrigerator.  Do not freeze.    Lantus 100 units/mL subcutaneous solution  -- 20-25units every morning

## 2018-10-23 NOTE — DISCHARGE NOTE ADULT - HOSPITAL COURSE
63-year-old female with HCV, cystic bronchiectasis (w/Stenotrophomonas colonization), RUL thin-walled cavitary lesion, HTN, IDDDM2, history of ABPA, presenting from home with weakness, cough productive of yellow sputum and difficulty breathing.  Patient reporting 1-2 episodes a day where she feels as if she cannot breath, with coughing episodes, denies syncope, reports adherence with home medication regimen.  Reports dizziness with fall yesterday, lowered herself to the ground, no LOC/head trauma.  She denies recent sick contacts, travel, fevers, chills, chest pain, palpitations, has occasional nausea, no vomiting, no abdominal pain, no diarrhea, constipation, melena, hematochezia, dysuria, hematuria. Reports occasional difficulty urinating.    In the ED VS: 98.4  89-96  144-150/3-84  18  95-97%RA, received 1L NS IVF, albuterol/ipratropium nebs x3, diphenhydramine 50mg PO x1, famotidine 20mg IV x1, magnesium sulfate 2g IV x1, methylprednisolone 125mg IV x1 63-year-old female with HCV, cystic bronchiectasis (w/Stenotrophomonas colonization), RUL thin-walled cavitary lesion, HTN, IDDDM2, history of ABPA, presenting from home with weakness, cough productive of yellow sputum and difficulty breathing.  Patient reporting 1-2 episodes a day where she feels as if she cannot breath, with coughing episodes, denies syncope, reports adherence with home medication regimen.  Reports dizziness with fall yesterday, lowered herself to the ground, no LOC/head trauma.  She denies recent sick contacts, travel, fevers, chills, chest pain, palpitations, has occasional nausea, no vomiting, no abdominal pain, no diarrhea, constipation, melena, hematochezia, dysuria, hematuria. Reports occasional difficulty urinating.    In the ED VS: 98.4  89-96  144-150/3-84  18  95-97%RA, received 1L NS IVF, albuterol/ipratropium nebs x3, diphenhydramine 50mg PO x1, famotidine 20mg IV x1, magnesium sulfate 2g IV x1, methylprednisolone 125mg IV x1    Once admitted, patient was given duonebs, continued on symbicort, montelukast, mucinex. 63-year-old female with HCV, cystic bronchiectasis (w/Stenotrophomonas colonization), RUL thin-walled cavitary lesion, HTN, IDDDM2, history of ABPA, presenting from home with weakness, cough productive of yellow sputum and difficulty breathing.  Patient reporting 1-2 episodes a day where she feels as if she cannot breath, with coughing episodes, denies syncope, reports adherence with home medication regimen.  Reports dizziness with fall yesterday, lowered herself to the ground, no LOC/head trauma.  She denies recent sick contacts, travel, fevers, chills, chest pain, palpitations, has occasional nausea, no vomiting, no abdominal pain, no diarrhea, constipation, melena, hematochezia, dysuria, hematuria. Reports occasional difficulty urinating.    In the ED VS: 98.4  89-96  144-150/3-84  18  95-97%RA, received 1L NS IVF, albuterol/ipratropium nebs x3, diphenhydramine 50mg PO x1, famotidine 20mg IV x1, magnesium sulfate 2g IV x1, methylprednisolone 125mg IV x1    Once admitted, patient was given duonebs, continued on symbicort, montelukast, and mucinex. Her workup included UA and RVP which were both negative. She had a chest x-ray which showed   There are bilateral reticular opacities, predominantly in the right upper lobe, which appear grossly unchanged compared to prior chest radiograph 7/30/2018. There is no pneumothorax, no pleural effusions. Cardiac size is within normal limits.A stippled sclerotic density in the left humerus is stable and likely secondary to a enchondroma. Patient was seen and evaluated by pulmonology who decided to start patient on  Bactrim 160 BID for two weeks, and advised to continue her symbicort and follow up with pulmonology outpatient.      Of note, patient with fingersticks in the 300s, patient on Lantus 20 at home. Recommend that patient follows up with her PMD for uncontrolled diabetes.     Patient daughter requested to take patient home as she was feeling better. Prior to discharge, patient hemodynamically stable.

## 2018-10-23 NOTE — ED ADULT NURSE NOTE - ED STAT RN HANDOFF DETAILS
No acute distress at present. Respirations are even and unlabored on room air. Pt. is awaiting transport to [facility].

## 2018-10-23 NOTE — DISCHARGE NOTE ADULT - ADDITIONAL INSTRUCTIONS
Dr. Dacosta, [address] [facility] Please follow up with Pulmonologist Dr. Dacosta, [address] [place] [address] within 1 week of discharge.   Please follow up with your PMD Dr. Sinclair within 1 week of discharge.

## 2018-10-23 NOTE — H&P ADULT - ASSESSMENT
none
63-year-old female with HCV, cystic bronchiectasis (w/Stenotrophomonas colonization), RUL thin-walled cavitary lesion, HTN, IDDDM2, history of ABPA, presenting from home with weakness, cough productive of yellow sputum and difficulty breathing

## 2018-10-23 NOTE — H&P ADULT - HISTORY OF PRESENT ILLNESS
63-year-old female with HCV, cystic bronchiectasis (w/Stenotrophomonas colonization), RUL thin-walled cavitary lesion, HTN, IDDDM2, history of ABPA, presenting from home with weakness, cough productive of yellow sputum and difficulty breathing.  Patient reporting 1-2 episodes of shortness of breath preceding witnessed syncopal episodes lasting 1-2minutes, without falls or head trauma.  She denies recent sick contacts, travel, fevers, chills, chest pain, palpitations, has occasional nausea, no vomiting, no abdominal pain, no diarrhea, constipation, melena, hematochezia, dysuria, hematuria.     In the ED VS: 98.4  89-96  144-150/3-84  18  95-97%RA, received 1L NS IVF, albuterol/ipratropium nebs x3, diphenhydramine 50mg PO x1, famotidine 20mg IV x1, magnesium sulfate 2g IV x1, methylprednisolone 125mg IV x1 63-year-old female with HCV, cystic bronchiectasis (w/Stenotrophomonas colonization), RUL thin-walled cavitary lesion, HTN, IDDDM2, history of ABPA, presenting from home with weakness, cough productive of yellow sputum and difficulty breathing.  Patient reporting 1-2 episodes a day where she feels as if she cannot breath, with coughing episodes, denies syncope, reports adherence with home medication regimen.  Reports dizziness with fall yesterday, lowered herself to the ground, no LOC/head trauma.  She denies recent sick contacts, travel, fevers, chills, chest pain, palpitations, has occasional nausea, no vomiting, no abdominal pain, no diarrhea, constipation, melena, hematochezia, dysuria, hematuria. Reports occasional difficulty urinating.    In the ED VS: 98.4  89-96  144-150/3-84  18  95-97%RA, received 1L NS IVF, albuterol/ipratropium nebs x3, diphenhydramine 50mg PO x1, famotidine 20mg IV x1, magnesium sulfate 2g IV x1, methylprednisolone 125mg IV x1 63-year-old female with HCV, cystic bronchiectasis (w/Stenotrophomonas colonization), RUL thin-walled cavitary lesion, HTN, IDDDM2, history of ABPA, presenting from home with weakness, cough productive of yellow sputum and difficulty breathing.  Patient reporting 1-2 episodes a day where she feels as if she cannot breath, with coughing episodes, denies syncope, reports adherence with home medication regimen.  Reports dizziness with fall yesterday, lowered herself to the ground, no LOC/head trauma.  She denies recent sick contacts, travel, fevers, chills, chest pain, palpitations, has occasional nausea, no vomiting, no abdominal pain, no diarrhea, constipation, melena, hematochezia, dysuria, hematuria. Reports occasional difficulty urinating.    In the ED VS: 98.4  89-96  144-150/3-84  18  95-97%RA, received 1L NS IVF, albuterol/ipratropium nebs x3, diphenhydramine 50mg PO x1, famotidine 20mg IV x1, magnesium sulfate 2g IV x1, methylprednisolone 125mg IV x1    Outpatient [employer] note from Dr. Young reviewed, last seen 9/27/18.

## 2018-10-23 NOTE — H&P ADULT - NSHPATTENDINGPLANDISCUSS_GEN_ALL_CORE
patient/ and bedside via daughter interpreting via phone in [facility] per patient request

## 2018-10-23 NOTE — DISCHARGE NOTE ADULT - CARE PLAN
Principal Discharge DX:	Bronchiectasis without complication Principal Discharge DX:	Bronchiectasis without complication  Goal:	Follow up with Dr. Dacosta within one week of discharge  Assessment and plan of treatment:	You were treated for an exacerbation of bronchiectasis. You were started on an antibiotic called Bactrim which you will take two times a day for two weeks. You are advised to continue to use Symbicort as directed. Principal Discharge DX:	Bronchiectasis without complication  Goal:	Follow up with Dr. Dacosta within one week of discharge  Assessment and plan of treatment:	You were treated for an exacerbation of bronchiectasis. You were started on an antibiotic called Bactrim which you will take two times a day for two weeks. You are advised to continue to use Symbicort as directed.  Secondary Diagnosis:	DM (diabetes mellitus)  Assessment and plan of treatment:	Your diabetes is very poorly controlled. You MUST see a primary care doctor within 1 week of discharge for follow up and monitor your blood sugar on a daily basis. Please inject 20u lantus everyday for diabetes and talk to your doctor about restarting your metformin.  Secondary Diagnosis:	Hypertension  Assessment and plan of treatment:	Please resume taking your losartan. Your blood pressure is very elevated in hospital and you need to be on a medication for that.

## 2018-10-23 NOTE — PROGRESS NOTE ADULT - SUBJECTIVE AND OBJECTIVE BOX
=========================================  CONTACT GIORGI Eagle M.D., PGY-1  Pager: [facility]- [phone], [facility]- [phone]    Mon-Fri: pager covered by day team 7am-7pm;   ***Academic conferences 12pm-1pm- page ONLY if URGENT or if Consultant  /[place]er: see chart, primary physician assigned available 7am-12pm  Sat/Patel Cross Coverage 12pm-7pm: [facility]- page [phone] for Team1-4, [facility]- pager forwarded to covering Resident  For Night coverage 7pm-7am: [facility]- page [phone] Team1-3, page [phone] Team4 & Care Model; [facility]- page [phone] for Red, [phone] for Blue  =========================================    Patient is a 63y old  Female who presents with a chief complaint of shortness of breath, weakness (23 Oct 2018 01:38)      SUBJECTIVE / OVERNIGHT EVENTS:  Patient seen and examined at bedside. No complaints at present.     Other Review of Systems Negative.    MEDICATIONS  (STANDING):  ALBUTerol/ipratropium for Nebulization 3 milliLiter(s) Nebulizer every 6 hours  buDESOnide 160 MICROgram(s)/formoterol 4.5 MICROgram(s) Inhaler 2 Puff(s) Inhalation two times a day  calcium carbonate 1250 mG  + Vitamin D (OsCal 500 + D) 1 Tablet(s) Oral daily  dextrose 5%. 1000 milliLiter(s) (50 mL/Hr) IV Continuous <Continuous>  dextrose 50% Injectable 12.5 Gram(s) IV Push once  dextrose 50% Injectable 25 Gram(s) IV Push once  dextrose 50% Injectable 25 Gram(s) IV Push once  docusate sodium 100 milliGRAM(s) Oral three times a day  enoxaparin Injectable 40 milliGRAM(s) SubCutaneous every 24 hours  fluticasone propionate 50 MICROgram(s)/spray Nasal Spray 1 Spray(s) Both Nostrils two times a day  folic acid 1 milliGRAM(s) Oral daily  guaiFENesin  milliGRAM(s) Oral every 12 hours  insulin glargine Injectable (LANTUS) 20 Unit(s) SubCutaneous every morning  insulin lispro (HumaLOG) corrective regimen sliding scale   SubCutaneous three times a day before meals  insulin lispro (HumaLOG) corrective regimen sliding scale   SubCutaneous at bedtime  ketotifen 0.025% Ophthalmic Solution 1 Drop(s) Both EYES two times a day  montelukast 10 milliGRAM(s) Oral at bedtime    MEDICATIONS  (PRN):  acetaminophen   Tablet .. 325 milliGRAM(s) Oral every 4 hours PRN Mild Pain (1 - 3), Moderate Pain (4 - 6)  dextrose 40% Gel 15 Gram(s) Oral once PRN Blood Glucose LESS THAN 70 milliGRAM(s)/deciliter  glucagon  Injectable 1 milliGRAM(s) IntraMuscular once PRN Glucose LESS THAN 70 milligrams/deciliter  polyethylene glycol 3350 17 Gram(s) Oral daily PRN Constipation  senna 2 Tablet(s) Oral at bedtime PRN Constipation      OBJECTIVE:    Vital Signs Last 24 Hrs  T(C): 36.7 (23 Oct 2018 06:58), Max: 36.9 (22 Oct 2018 17:28)  T(F): 98 (23 Oct 2018 06:58), Max: 98.4 (22 Oct 2018 17:28)  HR: 90 (23 Oct 2018 01:57) (89 - 96)  BP: 146/79 (23 Oct 2018 01:57) (144/73 - 150/84)  BP(mean): --  RR: 18 (23 Oct 2018 01:57) (18 - 18)  SpO2: 96% (23 Oct 2018 01:57) (95% - 97%)    CAPILLARY BLOOD GLUCOSE      POCT Blood Glucose.: 246 mg/dL (23 Oct 2018 08:33)  POCT Blood Glucose.: 246 mg/dL (23 Oct 2018 02:02)    I&O's Summary      PHYSICAL EXAM:  GENERAL: NAD, well-developed  HEAD:  Atraumatic, Normocephalic  EYES: EOMI, PERRLA, conjunctiva and sclera clear  NECK: Supple, No JVD  CHEST/LUNG: Scattered wheezes, coarse breath sounds b/l, no respiratory distress  HEART: Regular rate and rhythm; No murmurs, rubs, or gallops  ABDOMEN: Soft, Nontender, Nondistended; Bowel sounds present  EXTREMITIES:  2+ Peripheral Pulses, No clubbing, cyanosis, or edema  PSYCH: AAOx3  NEUROLOGY: non-focal  SKIN: No rashes or lesions    LABS:                        9.6    7.35  )-----------( 175      ( 23 Oct 2018 05:15 )             31.4     Auto Eosinophil # x     / Auto Eosinophil % x     / Auto Neutrophil # x     / Auto Neutrophil % x     / BANDS % x                            10.7   9.70  )-----------( 195      ( 22 Oct 2018 20:47 )             34.7     Auto Eosinophil # 0.22  / Auto Eosinophil % 2.3   / Auto Neutrophil # 6.15  / Auto Neutrophil % 63.4  / BANDS % x        1023    138  |  104  |  15  ----------------------------<  370<H>  4.2   |  22  |  0.53  10    141  |  103  |  13  ----------------------------<  154<H>  3.8   |  26  |  0.53    Ca    8.9      23 Oct 2018 05:15  Mg     2.1     10  Phos  3.1     10-23  TPro  7.5  /  Alb  3.8  /  TBili  0.3  /  DBili  x   /  AST  45<H>  /  ALT  34<H>  /  AlkPhos  239<H>  10          Urinalysis Basic - ( 23 Oct 2018 05:10 )    Color: LIGHT YELLOW / Appearance: CLEAR / S.012 / pH: 6.5  Gluc: 500 / Ketone: NEGATIVE  / Bili: NEGATIVE / Urobili: NORMAL   Blood: NEGATIVE / Protein: NEGATIVE / Nitrite: NEGATIVE   Leuk Esterase: NEGATIVE / RBC: 0-2 / WBC 0-2   Sq Epi: x / Non Sq Epi: x / Bacteria: x            ABG:     VBG: ( 20:47 ) - VBG - pH: 7.36  | pCO2: 49    | pO2: 37    | Lactate: 1.0          Care Discussed with Consultants/Other Providers:    RADIOLOGY & ADDITIONAL TESTS:  (Imaging Personally Reviewed)

## 2018-10-23 NOTE — CONSULT NOTE ADULT - ATTENDING COMMENTS
Agree with bactrim for 2 weeks, continue bronchiectasis managemnt with aerobica, hypertonic saline, bronchodilators.

## 2018-10-23 NOTE — PROGRESS NOTE ADULT - ASSESSMENT
63-year-old female with HCV, cystic bronchiectasis (w/Stenotrophomonas colonization), RUL thin-walled cavitary lesion, HTN, IDDDM2, history of ABPA, presenting from home with weakness, cough productive of yellow sputum and difficulty breathing 63-year-old female with HCV, cystic bronchiectasis (w/Stenotrophomonas colonization), RUL cavitary lesion, HTN, IDDDM2, history of ABPA, presenting from home with bronchiectasis exacerbation, with microcytic anemia (stable) Elevated AST/ALT (downtrending), uncontrolled DM, 63-year-old female with HCV (not active, positive antibody, negative viral load), cystic bronchiectasis (w/Stenotrophomonas colonization), RUL cavitary lesion, HTN, IDDDM2, history of ABPA, presenting from home with bronchiectasis exacerbation, with microcytic anemia (stable) Elevated AST/ALT (downtrending), uncontrolled DM,

## 2018-10-23 NOTE — DISCHARGE NOTE ADULT - CARE PROVIDER_API CALL
Donny Dacosta), Critical Care Medicine; Internal Medicine; Pulmonary Disease  [address] [id number]  Phone: [phone]  Fax: [phone]

## 2018-10-23 NOTE — CONSULT NOTE ADULT - ASSESSMENT
63F with bronchiectasis exacerbation.    - Patient with h/o Sternotrophomonas sensitive to Bactrim.  - Please start Bactrim 160mg PO q 12 x two weeks.  - Please continue Symbicort, hyper sal 3%, nebs prn, Aerobika device or Acapella.  - Outpatient follow up at [address] [place] [address] with Dr. Dacosta who has seen patient in clinic before.  - Discussed with patient and primary team.

## 2018-10-23 NOTE — H&P ADULT - NSHPPHYSICALEXAM_GEN_ALL_CORE
PHYSICAL EXAM:  GENERAL: NAD, well-developed, well-nourished  HEAD:  Atraumatic, Normocephalic  EYES: EOMI, PERRLA, conjunctiva and sclera clear  NECK: Supple, No JVD  CHEST/LUNG: scattered wheezing; bilateral upper lobe intermittent rhonchi posteriorly; No rales   HEART: Regular rate and rhythm; No murmurs, rubs, or gallops, (+)S1, S2  ABDOMEN: Soft, Nontender, Nondistended; Normal Bowel sounds   EXTREMITIES:  2+ Peripheral Pulses, No clubbing, cyanosis, or edema  PSYCH: normal mood and affect; (+)anxiety in setting of shortness of breath  NEUROLOGY: AAOx3, non-focal  SKIN: No rashes or lesions PHYSICAL EXAM:  GENERAL: NAD, well-developed, well-nourished  HEAD:  Atraumatic, Normocephalic  EYES: EOMI, PERRLA, conjunctiva and sclera clear  NECK: Supple, No JVD  CHEST/LUNG: scattered wheezing; bilateral upper lobe intermittent rhonchi posteriorly; No rales; NAD; speaking in full sentences; no accessory muscle use  HEART: Regular rate and rhythm; No murmurs, rubs, or gallops, (+)S1, S2  ABDOMEN: Soft, Nontender, Nondistended; Normal Bowel sounds   EXTREMITIES:  2+ Peripheral Pulses, No clubbing, cyanosis, or edema  PSYCH: normal mood and affect; (+)anxiety in setting of shortness of breath  NEUROLOGY: AAOx3, non-focal  SKIN: No rashes or lesions

## 2018-10-23 NOTE — PROGRESS NOTE ADULT - PROBLEM SELECTOR PLAN 1
s/p solumedrol 125mg IV x1 in ED, minimal wheezing on exam, will hold off on further prednisone at present, re-eval in AM  c/w duonebs ATC  c/w symbicort, montelukast, mucinex   chest PT, c/w Aerobika, incentive spirometry, 3%NS neb BID  pulm consult in AM  RVP negative  follow up official read of CXR

## 2018-10-23 NOTE — PROGRESS NOTE ADULT - PROBLEM SELECTOR PLAN 5
(+)HCV on outpatient testing, per , patient was undergoing work-up for possible malignancy  stable transaminitis  had some elevated tumor markers on outpatient testing, call PMD in AM to clarify w/u, any prior treatment for HCV?

## 2018-10-23 NOTE — H&P ADULT - PROBLEM SELECTOR PLAN 7
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

## 2018-10-23 NOTE — H&P ADULT - NSHPLABSRESULTS_GEN_ALL_CORE
10-22    141  |  103  |  13  ----------------------------<  154<H>  3.8   |  26  |  0.53    Ca    9.6      22 Oct 2018 20:47    TPro  7.5  /  Alb  3.8  /  TBili  0.3  /  DBili  x   /  AST  45<H>  /  ALT  34<H>  /  AlkPhos  239<H>  10-22                        10.7   9.70  )-----------( 195      ( 22 Oct 2018 20:47 )             34.7     20:47 - VBG - pH: 7.36  | pCO2: 49    | pO2: 37    | Lactate: 1.0      CXR personally reviewed - hyperinflated lungs; reticular opacities bilateral upper lobes R>L; prominent hilar vasculature w/L hilar ?LAD; image grossly unchanged from prior 7/2018    Labs from PMD reviewed 10/08/18:  A1c 9.6%  HCV Ab (+)  Hb 10.6  25-hydroxy Vit D 26.1  Uric acid 3.2mg/dL  Alk phos 180U/L; AST/ALT 38/31 U/L  Alb 3.67 g/dL  UA negative w/exception of glucose >1000  GC/Chlamydia RNA negative   Lipids: HDL 66; LDL 60; Triglycerides 96  Ca 19-9: 46.1 U/mL  AFT 2.3 ng/mL  CEA 5.68 ng/mL  Ca 125 64.8 U/mL  Vit B12 >1500 10-22    141  |  103  |  13  ----------------------------<  154<H>  3.8   |  26  |  0.53    Ca    9.6      22 Oct 2018 20:47    TPro  7.5  /  Alb  3.8  /  TBili  0.3  /  DBili  x   /  AST  45<H>  /  ALT  34<H>  /  AlkPhos  239<H>  10-22                        10.7   9.70  )-----------( 195      ( 22 Oct 2018 20:47 )             34.7     20:47 - VBG - pH: 7.36  | pCO2: 49    | pO2: 37    | Lactate: 1.0      CXR personally reviewed - hyperinflated lungs; reticular opacities bilateral upper lobes R>L; prominent hilar vasculature w/L hilar ?LAD; image grossly unchanged from prior 7/2018    Labs from PMD reviewed 10/08/18:  A1c 9.6%  HCV Ab (+)  Hb 10.6  25-hydroxy Vit D 26.1  Uric acid 3.2mg/dL  Alk phos 180U/L; AST/ALT 38/31 U/L  Alb 3.67 g/dL  UA negative w/exception of glucose >1000  GC/Chlamydia RNA negative   Lipids: HDL 66; LDL 60; Triglycerides 96  Ca 19-9: 46.1 U/mL  AFT 2.3 ng/mL  CEA 5.68 ng/mL  Ca 125 64.8 U/mL  Vit B12 >1500    EKG ordered and pending

## 2018-10-23 NOTE — H&P ADULT - NSHPREVIEWOFSYSTEMS_GEN_ALL_CORE
REVIEW OF SYSTEMS:    CONSTITUTIONAL: (+) weakness, No fevers or chills; (+)anorexia  EYES/ENT: No visual changes;  ? dysphagia  NECK: No pain or stiffness  RESPIRATORY: (+)productive cough, wheezing; No hemoptysis; (+) shortness of breath  CARDIOVASCULAR: No chest pain or palpitations; No lower extremity edema  GASTROINTESTINAL: No abdominal or epigastric pain. (+) nausea; No vomiting; No diarrhea or constipation. No melena or hematochezia.  GENITOURINARY: (+) dysuria - chronic; No hematuria  NEUROLOGICAL: No numbness or weakness; neuropathy in fingers/toes; (+)headache; no LH/dizziness  MUSCULOSKELETAL: Ambulates without aid, no recent falls; (+)bilateral elbow/foot pain  SKIN: No itching, burning, rashes, or lesions   All other review of systems is negative unless indicated above. REVIEW OF SYSTEMS:    CONSTITUTIONAL: (+) weakness, No fevers or chills; (+)anorexia  EYES/ENT: No visual changes;  (+)dry mouth/throat with resulting difficulty swallowing at times  NECK: No pain or stiffness  RESPIRATORY: (+)productive cough, wheezing; No hemoptysis; (+) shortness of breath  CARDIOVASCULAR: No chest pain or palpitations; No lower extremity edema  GASTROINTESTINAL: No abdominal or epigastric pain. (+) nausea; No vomiting; No diarrhea or constipation. No melena or hematochezia.  GENITOURINARY: (+) dysuria - chronic; No hematuria  NEUROLOGICAL: No numbness or weakness; neuropathy in fingers/toes; (+)headache; no LH/dizziness  MUSCULOSKELETAL: Ambulates without aid, no recent falls; (+)bilateral elbow/foot pain  SKIN: No itching, burning, rashes, or lesions   All other review of systems is negative unless indicated above.

## 2018-10-23 NOTE — DISCHARGE NOTE ADULT - MEDICATION SUMMARY - MEDICATIONS TO TAKE
I will START or STAY ON the medications listed below when I get home from the hospital:    acetaminophen 325 mg oral tablet  -- 2 tab(s) by mouth every 4 hours, As Needed - 3), Moderate Pain (4 - 6)  -- Indication: For pain management    losartan 50 mg oral tablet  -- 1 tab(s) by mouth once a day  -- Indication: For Hypertension    Lantus 100 units/mL subcutaneous solution  -- 20 units every morning  -- Indication: For diabetes    Symbicort 160 mcg-4.5 mcg/inh inhalation aerosol  -- 2 puff(s) inhaled 2 times a day  -- Indication: For Bronchiectasis with acute exacerbation    Breo Ellipta 100 mcg-25 mcg/inh inhalation powder  -- 1 puff(s) inhaled once a day  -- Indication: For Bronchiectasis with acute exacerbation    Mucinex 600 mg oral tablet, extended release  -- 1 tab(s) by mouth every 12 hours, As Needed for cough    -- Indication: For Bronchiectasis with acute exacerbation    sodium chloride 0.9% nasal spray  -- 1 spray(s) intranasally 2 times a day   -- For the nose.    -- Indication: For Bronchiectasis with acute exacerbation    sulfamethoxazole-trimethoprim 800 mg-160 mg oral tablet  -- 1 tab(s) by mouth every 12 hours  -- Indication: For Bronchiectasis with acute exacerbation    Calcium 600+D oral tablet  -- 1 tab(s) by mouth 2 times a day  -- Indication: For Health maintenance    Daily Atul oral tablet  -- 1 tab(s) by mouth once a day  -- Indication: For Health maintenance    Vitamin B12 1000 mcg oral tablet  -- 1 tab(s) by mouth once a day  -- Indication: For Health maintenance    folic acid 1 mg oral tablet  -- 1 tab(s) by mouth once a day  -- Indication: For Health maintenance

## 2018-10-23 NOTE — PHYSICAL THERAPY INITIAL EVALUATION ADULT - ADDITIONAL COMMENTS
unable to obtain social history secondary to pt. non english speaking, pt. deferred use of interpretive services however, Pt. is able to follow commands/instruction, make needs known and participate in therapy session. Please consult with social work/case management for accurate assessment of social history. Pt. returned seated in chair with all tubes/lines intact, call bell in reach and in NAD.

## 2018-10-23 NOTE — PHYSICAL THERAPY INITIAL EVALUATION ADULT - PERTINENT HX OF CURRENT PROBLEM, REHAB EVAL
Pt. is a 63 year old female admitted to [facility] secondary to bronchiectasis with acute exacerbation. PMH: HTN, DM.

## 2018-10-25 ENCOUNTER — EMERGENCY (EMERGENCY)
Facility: HOSPITAL | Age: 63
LOS: 1 days | Discharge: HOME | End: 2018-10-25
Attending: EMERGENCY MEDICINE | Admitting: EMERGENCY MEDICINE
Payer: MEDICAID

## 2018-10-25 VITALS
TEMPERATURE: 98 F | OXYGEN SATURATION: 97 % | HEART RATE: 90 BPM | SYSTOLIC BLOOD PRESSURE: 184 MMHG | DIASTOLIC BLOOD PRESSURE: 100 MMHG | RESPIRATION RATE: 26 BRPM

## 2018-10-25 VITALS
RESPIRATION RATE: 20 BRPM | OXYGEN SATURATION: 98 % | TEMPERATURE: 98 F | DIASTOLIC BLOOD PRESSURE: 72 MMHG | HEART RATE: 94 BPM | SYSTOLIC BLOOD PRESSURE: 169 MMHG

## 2018-10-25 DIAGNOSIS — Z90.49 ACQUIRED ABSENCE OF OTHER SPECIFIED PARTS OF DIGESTIVE TRACT: Chronic | ICD-10-CM

## 2018-10-25 LAB
-  AMIKACIN: SIGNIFICANT CHANGE UP
-  AZTREONAM: SIGNIFICANT CHANGE UP
-  CEFEPIME: SIGNIFICANT CHANGE UP
-  CEFTAZIDIME: SIGNIFICANT CHANGE UP
-  CIPROFLOXACIN: SIGNIFICANT CHANGE UP
-  GENTAMICIN: SIGNIFICANT CHANGE UP
-  IMIPENEM: SIGNIFICANT CHANGE UP
-  LEVOFLOXACIN: SIGNIFICANT CHANGE UP
-  MEROPENEM: SIGNIFICANT CHANGE UP
-  PIPERACILLIN/TAZOBACTAM: SIGNIFICANT CHANGE UP
-  TOBRAMYCIN: SIGNIFICANT CHANGE UP
ALBUMIN SERPL ELPH-MCNC: 3.6 G/DL — SIGNIFICANT CHANGE UP (ref 3.3–5)
ALP SERPL-CCNC: 216 U/L — HIGH (ref 40–120)
ALT FLD-CCNC: 34 U/L — HIGH (ref 4–33)
AST SERPL-CCNC: 46 U/L — HIGH (ref 4–32)
BACTERIA SPT RESP CULT: SIGNIFICANT CHANGE UP
BASOPHILS # BLD AUTO: 0.04 K/UL — SIGNIFICANT CHANGE UP (ref 0–0.2)
BASOPHILS NFR BLD AUTO: 0.5 % — SIGNIFICANT CHANGE UP (ref 0–2)
BILIRUB SERPL-MCNC: 0.3 MG/DL — SIGNIFICANT CHANGE UP (ref 0.2–1.2)
BUN SERPL-MCNC: 17 MG/DL — SIGNIFICANT CHANGE UP (ref 7–23)
CALCIUM SERPL-MCNC: 9 MG/DL — SIGNIFICANT CHANGE UP (ref 8.4–10.5)
CHLORIDE SERPL-SCNC: 99 MMOL/L — SIGNIFICANT CHANGE UP (ref 98–107)
CO2 SERPL-SCNC: 20 MMOL/L — LOW (ref 22–31)
CREAT SERPL-MCNC: 0.6 MG/DL — SIGNIFICANT CHANGE UP (ref 0.5–1.3)
EOSINOPHIL # BLD AUTO: 0.25 K/UL — SIGNIFICANT CHANGE UP (ref 0–0.5)
EOSINOPHIL NFR BLD AUTO: 3.1 % — SIGNIFICANT CHANGE UP (ref 0–6)
GLUCOSE SERPL-MCNC: 140 MG/DL — HIGH (ref 70–99)
GRAM STN SPT: SIGNIFICANT CHANGE UP
HCT VFR BLD CALC: 34.2 % — LOW (ref 34.5–45)
HGB BLD-MCNC: 10.6 G/DL — LOW (ref 11.5–15.5)
IMM GRANULOCYTES # BLD AUTO: 0.03 # — SIGNIFICANT CHANGE UP
IMM GRANULOCYTES NFR BLD AUTO: 0.4 % — SIGNIFICANT CHANGE UP (ref 0–1.5)
LYMPHOCYTES # BLD AUTO: 2.08 K/UL — SIGNIFICANT CHANGE UP (ref 1–3.3)
LYMPHOCYTES # BLD AUTO: 25.6 % — SIGNIFICANT CHANGE UP (ref 13–44)
MAGNESIUM SERPL-MCNC: 2 MG/DL — SIGNIFICANT CHANGE UP (ref 1.6–2.6)
MCHC RBC-ENTMCNC: 22.8 PG — LOW (ref 27–34)
MCHC RBC-ENTMCNC: 31 % — LOW (ref 32–36)
MCV RBC AUTO: 73.7 FL — LOW (ref 80–100)
METHOD TYPE: SIGNIFICANT CHANGE UP
MONOCYTES # BLD AUTO: 0.66 K/UL — SIGNIFICANT CHANGE UP (ref 0–0.9)
MONOCYTES NFR BLD AUTO: 8.1 % — SIGNIFICANT CHANGE UP (ref 2–14)
NEUTROPHILS # BLD AUTO: 5.06 K/UL — SIGNIFICANT CHANGE UP (ref 1.8–7.4)
NEUTROPHILS NFR BLD AUTO: 62.3 % — SIGNIFICANT CHANGE UP (ref 43–77)
NRBC # FLD: 0 — SIGNIFICANT CHANGE UP
ORGANISM # SPEC MICROSCOPIC CNT: SIGNIFICANT CHANGE UP
ORGANISM # SPEC MICROSCOPIC CNT: SIGNIFICANT CHANGE UP
PHOSPHATE SERPL-MCNC: 3.5 MG/DL — SIGNIFICANT CHANGE UP (ref 2.5–4.5)
PLATELET # BLD AUTO: 215 K/UL — SIGNIFICANT CHANGE UP (ref 150–400)
PMV BLD: SIGNIFICANT CHANGE UP FL (ref 7–13)
POTASSIUM SERPL-MCNC: 4.1 MMOL/L — SIGNIFICANT CHANGE UP (ref 3.5–5.3)
POTASSIUM SERPL-SCNC: 4.1 MMOL/L — SIGNIFICANT CHANGE UP (ref 3.5–5.3)
PROT SERPL-MCNC: 7.6 G/DL — SIGNIFICANT CHANGE UP (ref 6–8.3)
RBC # BLD: 4.64 M/UL — SIGNIFICANT CHANGE UP (ref 3.8–5.2)
RBC # FLD: 18.4 % — HIGH (ref 10.3–14.5)
SODIUM SERPL-SCNC: 136 MMOL/L — SIGNIFICANT CHANGE UP (ref 135–145)
WBC # BLD: 8.12 K/UL — SIGNIFICANT CHANGE UP (ref 3.8–10.5)
WBC # FLD AUTO: 8.12 K/UL — SIGNIFICANT CHANGE UP (ref 3.8–10.5)

## 2018-10-25 PROCEDURE — 99284 EMERGENCY DEPT VISIT MOD MDM: CPT | Mod: GC

## 2018-10-25 PROCEDURE — 99285 EMERGENCY DEPT VISIT HI MDM: CPT | Mod: 25

## 2018-10-25 PROCEDURE — 93010 ELECTROCARDIOGRAM REPORT: CPT

## 2018-10-25 RX ORDER — CIPROFLOXACIN LACTATE 400MG/40ML
1 VIAL (ML) INTRAVENOUS
Qty: 14 | Refills: 0 | OUTPATIENT
Start: 2018-10-25 | End: 2018-11-07

## 2018-10-25 RX ORDER — AMLODIPINE BESYLATE 2.5 MG/1
5 TABLET ORAL ONCE
Qty: 0 | Refills: 0 | Status: COMPLETED | OUTPATIENT
Start: 2018-10-25 | End: 2018-10-25

## 2018-10-25 RX ORDER — SODIUM CHLORIDE 9 MG/ML
500 INJECTION INTRAMUSCULAR; INTRAVENOUS; SUBCUTANEOUS ONCE
Qty: 0 | Refills: 0 | Status: COMPLETED | OUTPATIENT
Start: 2018-10-25 | End: 2018-10-25

## 2018-10-25 RX ORDER — SODIUM CHLORIDE 9 MG/ML
3 INJECTION INTRAMUSCULAR; INTRAVENOUS; SUBCUTANEOUS EVERY 8 HOURS
Qty: 0 | Refills: 0 | Status: DISCONTINUED | OUTPATIENT
Start: 2018-10-25 | End: 2018-10-29

## 2018-10-25 RX ORDER — AMLODIPINE BESYLATE 2.5 MG/1
1 TABLET ORAL
Qty: 5 | Refills: 0 | OUTPATIENT
Start: 2018-10-25 | End: 2018-10-29

## 2018-10-25 RX ORDER — INSULIN GLARGINE 100 [IU]/ML
20 INJECTION, SOLUTION SUBCUTANEOUS ONCE
Qty: 0 | Refills: 0 | Status: COMPLETED | OUTPATIENT
Start: 2018-10-25 | End: 2018-10-25

## 2018-10-25 RX ADMIN — Medication 125 MILLIGRAM(S): at 11:03

## 2018-10-25 RX ADMIN — Medication 0.5 MILLIGRAM(S): at 08:02

## 2018-10-25 RX ADMIN — AMLODIPINE BESYLATE 5 MILLIGRAM(S): 2.5 TABLET ORAL at 11:03

## 2018-10-25 RX ADMIN — SODIUM CHLORIDE 500 MILLILITER(S): 9 INJECTION INTRAMUSCULAR; INTRAVENOUS; SUBCUTANEOUS at 08:02

## 2018-10-25 RX ADMIN — INSULIN GLARGINE 20 UNIT(S): 100 INJECTION, SOLUTION SUBCUTANEOUS at 11:03

## 2018-10-25 NOTE — ED ADULT NURSE REASSESSMENT NOTE - NS ED NURSE REASSESS COMMENT FT1
Received rpt from night RN, pt. A&Ox3, c/o generalized weakness and swollen lips. h/o bronchiectasis, started on bactrim recently for new bacterial infection. VSS, breathing well on RA. Denies any difficulty swallowing or breathing. respirations are even and unlabored. Denies abdominal pain, n/v/d or recent fever. Will continue to monitor.

## 2018-10-25 NOTE — ED PROVIDER NOTE - OBJECTIVE STATEMENT
63 lauren-speaking F hx bronchiectasis, syncopal episodes, htn, dm2, hep C and discharged from hospital 2 days ago for Bronchiectasis with acute exacerbation presents with generalized weakness and paresthesias starting around 3AM. Daughter is translating for pt. She endorse this AM while going to bathroom, she felt weak and burning skin sensations from hands and feet so had to sit down. Denies fall or hitting head, LOC, "room spinning" dizziness, HA, focal weakness, slurred speech, cp, n/v, palpitations, abdominal pain or dysuria. She endorses a chronic sob due to her lung issues. She was taking steroids during admission which was stopped upon discharge 2 days ago and was started on bactrim starting 2 days ago.

## 2018-10-25 NOTE — ED PROVIDER NOTE - PROGRESS NOTE DETAILS
Pt still feel subjectively weak and is saying her lips have been swelling up today. Consider dc bactrim vs losartan. Pulm fellow consulted, will come see pt and try to get her a close f/u in the clinic to discuss restarting steroids and switching her from bactrim. Otherwise pt hemodynamically stable. Will feed pt and give her home lantus 20 units. Pulm saw pt. Saw she is growing pseudomonas in sputum so want her to be discharged with 2 weeks of levaquin 500mg daily and 5 days of 40mg prednisone. Pt to f/u in December with clinic. Pt will be referred to psych for anxiety and depressive symptoms. Pulm saw pt. Saw she is growing pseudomonas in sputum so want her to be discharged with 2 weeks of levaquin 500mg daily and 5 days of 40mg prednisone. Pt to f/u in December with clinic. Pt will be referred to psych for anxiety and depressive symptoms. Will likely switch from losartan to another BP med. Will include instructions for pcp

## 2018-10-25 NOTE — ED PROVIDER NOTE - ATTENDING CONTRIBUTION TO CARE
pt presenting with a reported paresthesia of hotness flowing through her body.  Started suddenly In the middle of the night.  Also reports some generalized weakness and tingling in her finger tips.  No SOB no fever no CP.  Has history of bronchiaetacsis for which she was just hospitalized and finished a course of steroids and started on Bactrim at dc.  Feels better still from a breathing standpoint.  All history obtained with daughter translating at request of patient who declined a professional     Gen: Well appearing in NAD  Head: NC/AT  Neck: trachea midline  Resp:  No distress coarse breath sounds throughout with scattered wheezing.    CV: RRR  Ext: no deformities  Neuro:  A&O non focal  Skin:  Warm and dry as visualized    Pt with a number of odd paresthesias - not consistent with a CVA.  Will check lytes in setting of recent steroid course.  Will gently hydrate as well.  Based on response to therapy and labs I anticpiate dc for the patient.  Does not appear to be allergic to ABx

## 2018-10-25 NOTE — CONSULT NOTE ADULT - SUBJECTIVE AND OBJECTIVE BOX
CHIEF COMPLAINT:    HPI:    PAST MEDICAL & SURGICAL HISTORY:  Hypertension  Bronchiectasis  ABPA (allergic bronchopulmonary aspergillosis)  DM (diabetes mellitus)  History of cholecystectomy      FAMILY HISTORY:  No pertinent family history in first degree relatives      SOCIAL HISTORY:  Smoking: [ ] Never Smoked [ ] Former Smoker (__ packs x ___ years) [ ] Current Smoker  (__ packs x ___ years)  Substance Use: [ ] Never Used [ ] Used ____  EtOH Use:  Marital Status: [ ] Single [ ]  [ ]  [ ]   Sexual History:   Occupation:  Recent Travel:  Country of Birth:  Advance Directives:    Allergies    No Known Allergies    Intolerances        HOME MEDICATIONS:    REVIEW OF SYSTEMS:  Constitutional: [ ] negative [ ] fevers [ ] chills [ ] weight loss [ ] weight gain  HEENT: [ ] negative [ ] dry eyes [ ] eye irritation [ ] postnasal drip [ ] nasal congestion  CV: [ ] negative  [ ] chest pain [ ] orthopnea [ ] palpitations [ ] murmur  Resp: [ ] negative [ ] cough [ ] shortness of breath [ ] dyspnea [ ] wheezing [ ] sputum [ ] hemoptysis  GI: [ ] negative [ ] nausea [ ] vomiting [ ] diarrhea [ ] constipation [ ] abd pain [ ] dysphagia   : [ ] negative [ ] dysuria [ ] nocturia [ ] hematuria [ ] increased urinary frequency  Musculoskeletal: [ ] negative [ ] back pain [ ] myalgias [ ] arthralgias [ ] fracture  Skin: [ ] negative [ ] rash [ ] itch  Neurological: [ ] negative [ ] headache [ ] dizziness [ ] syncope [ ] weakness [ ] numbness  Psychiatric: [ ] negative [ ] anxiety [ ] depression  Endocrine: [ ] negative [ ] diabetes [ ] thyroid problem  Hematologic/Lymphatic: [ ] negative [ ] anemia [ ] bleeding problem  Allergic/Immunologic: [ ] negative [ ] itchy eyes [ ] nasal discharge [ ] hives [ ] angioedema  [ ] All other systems negative  [ ] Unable to assess ROS because ________    OBJECTIVE:  ICU Vital Signs Last 24 Hrs  T(C): 36.8 (25 Oct 2018 08:59), Max: 36.8 (25 Oct 2018 06:25)  T(F): 98.3 (25 Oct 2018 08:59), Max: 98.3 (25 Oct 2018 06:25)  HR: 81 (25 Oct 2018 08:59) (78 - 90)  BP: 177/82 (25 Oct 2018 08:59) (142/72 - 184/100)  BP(mean): --  ABP: --  ABP(mean): --  RR: 18 (25 Oct 2018 08:59) (16 - 26)  SpO2: 98% (25 Oct 2018 08:59) (96% - 98%)        CAPILLARY BLOOD GLUCOSE      POCT Blood Glucose.: 162 mg/dL (25 Oct 2018 05:25)      PHYSICAL EXAM:  General:   HEENT:   Lymph Nodes:  Neck:   Respiratory:   Cardiovascular:   Abdomen:   Extremities:   Skin:   Neurological:  Psychiatry:    HOSPITAL MEDICATIONS:    levoFLOXacin IVPB 750 milliGRAM(s) IV Intermittent once      insulin glargine Injectable (LANTUS) 20 Unit(s) SubCutaneous once  methylPREDNISolone sodium succinate Injectable 125 milliGRAM(s) IV Push Once              sodium chloride 0.9% lock flush 3 milliLiter(s) IV Push every 8 hours            LABS:                        10.6   8.12  )-----------( 215      ( 25 Oct 2018 07:30 )             34.2     Hgb Trend: 10.6<--, 9.6<--, 10.7<--  10-25    136  |  99  |  17  ----------------------------<  140<H>  4.1   |  20<L>  |  0.60    Ca    9.0      25 Oct 2018 07:30  Phos  3.5     10-25  Mg     2.0     10-25    TPro  7.6  /  Alb  3.6  /  TBili  0.3  /  DBili  x   /  AST  46<H>  /  ALT  34<H>  /  AlkPhos  216<H>  10-25    Creatinine Trend: 0.60<--, 0.53<--, 0.53<--            MICROBIOLOGY:     RADIOLOGY:  [ ] Reviewed and interpreted by me    PULMONARY FUNCTION TESTS:    EKG: CHIEF COMPLAINT:  weakness    HPI: 63F with DM2, HTN, ABPA,  bronchiectasis with negative cystic fibrosis workup, negative vasculitis workup and negative sputum for AFB but 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 She was discharged empirically on Bactrim as sputum cultures were still pending and she had sensitivity to Bactrim in past for her S. Maltophila. Patient presents today with acute tingling, burning sensation on skin of hands and legs and dry mouth with sensation of throat discomfort. On re-examination of her sputum obtained from her last hospitalization, patient is now growing moderate Pseudomonas with sensitivities pending. Patient still making sputum and is using her nebulizers.     PAST MEDICAL & SURGICAL HISTORY:  Hypertension  Bronchiectasis  ABPA (allergic bronchopulmonary aspergillosis)  DM (diabetes mellitus)  History of cholecystectomy      FAMILY HISTORY:  No pertinent family history in first degree relatives      SOCIAL HISTORY:  Smoking: [x ] Never Smoked [ ] Former Smoker (__ packs x ___ years) [ ] Current Smoker  (__ packs x ___ years)  Substance Use: [ ] Never Used [ ] Used ____  EtOH Use:  Marital Status: [ ] Single [ x]  [ ]  [ ]   Sexual History:   Occupation:  Recent Travel:  Country of Birth:  Advance Directives:    Allergies    No Known Allergies    Intolerances        HOME MEDICATIONS:    REVIEW OF SYSTEMS:  Constitutional: [x ] negative [ ] fevers [ ] chills [ ] weight loss [ ] weight gain  HEENT: [x ] negative [ ] dry eyes [ ] eye irritation [ ] postnasal drip [ ] nasal congestion  CV: [x ] negative  [ ] chest pain [ ] orthopnea [ ] palpitations [ ] murmur  Resp: [ ] negative [x ] cough [x ] shortness of breath [ ] dyspnea [x] wheezing [x ] sputum [ ] hemoptysis  GI: [ x] negative [ ] nausea [ ] vomiting [ ] diarrhea [ ] constipation [ ] abd pain [ ] dysphagia   : [ x] negative [ ] dysuria [ ] nocturia [ ] hematuria [ ] increased urinary frequency  Musculoskeletal: [x ] negative [ ] back pain [ ] myalgias [ ] arthralgias [ ] fracture  Skin: [ ] negative [ ] rash [ ] itch  Neurological: [ ] negative [ ] headache [ ] dizziness [ ] syncope [ ] weakness [x ] numbness/tingling/burning  Psychiatric: [x ] negative [ ] anxiety [ ] depression  Endocrine: [x ] negative [ ] diabetes [ ] thyroid problem  Hematologic/Lymphatic: [ ] negative [ ] anemia [ ] bleeding problem  Allergic/Immunologic: [ ] negative [ ] itchy eyes [ ] nasal discharge [ ] hives [ ] angioedema  [ ] All other systems negative  [ ] Unable to assess ROS because ________    OBJECTIVE:  T(C): 36.8 (25 Oct 2018 08:59), Max: 36.8 (25 Oct 2018 06:25)  T(F): 98.3 (25 Oct 2018 08:59), Max: 98.3 (25 Oct 2018 06:25)  HR: 81 (25 Oct 2018 08:59) (78 - 90)  BP: 177/82 (25 Oct 2018 08:59) (142/72 - 184/100)  RR: 18 (25 Oct 2018 08:59) (16 - 26)  SpO2: 98% (25 Oct 2018 08:59) (96% - 98%)        CAPILLARY BLOOD GLUCOSE      POCT Blood Glucose.: 162 mg/dL (25 Oct 2018 05:25)      PHYSICAL EXAM:  General: NAD  HEENT: SARAH  Lymph Nodes:  Neck: No JVD  Respiratory: B/L ronchi  Cardiovascular: RRR  Abdomen: S/NT/ND  Extremities: -C/C/E  Skin:   Neurological: Non-focal  Psychiatry:    HOSPITAL MEDICATIONS:    levoFLOXacin IVPB 750 milliGRAM(s) IV Intermittent once      insulin glargine Injectable (LANTUS) 20 Unit(s) SubCutaneous once  methylPREDNISolone sodium succinate Injectable 125 milliGRAM(s) IV Push Once              sodium chloride 0.9% lock flush 3 milliLiter(s) IV Push every 8 hours            LABS:                        10.6   8.12  )-----------( 215      ( 25 Oct 2018 07:30 )             34.2     Hgb Trend: 10.6<--, 9.6<--, 10.7<--  10-25    136  |  99  |  17  ----------------------------<  140<H>  4.1   |  20<L>  |  0.60    Ca    9.0      25 Oct 2018 07:30  Phos  3.5     10-25  Mg     2.0     10-25    TPro  7.6  /  Alb  3.6  /  TBili  0.3  /  DBili  x   /  AST  46<H>  /  ALT  34<H>  /  AlkPhos  216<H>  10-25    Creatinine Trend: 0.60<--, 0.53<--, 0.53<--            MICROBIOLOGY:     RADIOLOGY:  [ ] Reviewed and interpreted by me    PULMONARY FUNCTION TESTS:    EKG:

## 2018-10-25 NOTE — ED PROVIDER NOTE - PLAN OF CARE
1) You were seen in the ER for weakness. You have also been found to have Pseudomonas (an infection) growing in your sputum. The patient has been informed of all concerning signs and symptoms to return to Emergency Department, the necessity to follow up with PMD/Clinic/follow up provided within 2-3 days was explained, and the patient reports understanding of above with capacity and insight. You can look at the discharge papers for some examples of specific signs and symptoms to look out for.  2) Please follow up with Pulmonology outpatient and Psychiatry outpatient for anxiety/depressive symptoms.   3) Please complete the Levaqin antibiotic for 2 weeks and Steroids for 4 more days.   4) Patient will discontinue Bactrim and PCP shoulder consider stopping Losartan due to subjective lip swelling. 1) You were seen in the ER for weakness. You have also been found to have Pseudomonas (an infection) growing in your sputum. The patient has been informed of all concerning signs and symptoms to return to Emergency Department, the necessity to follow up with PMD/Clinic/follow up provided within 2-3 days was explained, and the patient reports understanding of above with capacity and insight. You can look at the discharge papers for some examples of specific signs and symptoms to look out for.  2) Please follow up with Pulmonology outpatient and Psychiatry outpatient for anxiety/depressive symptoms.   3) Please complete the Levaqin antibiotic for 2 weeks and Steroids for 4 more days.   4) Patient will discontinue Bactrim and PCP shoulder consider stopping Losartan due to subjective lip swelling.  5) PMD should consider dc'ing losartan for new blood pressure medication due to subjective lip swelling.

## 2018-10-25 NOTE — CONSULT NOTE ADULT - ATTENDING COMMENTS
Patient with allergic reaction to bactrim, now with pseudomonas in sputum, will switch to levoquin and short course of Prednisone.

## 2018-10-25 NOTE — ED ADULT NURSE NOTE - CHIEF COMPLAINT QUOTE
Pt c/o total body numbness and tingling since waking up at 330am, went to sleep 9pm. Pt states was going to the bathroom when she felt symptoms. Pt hyperventilating in triage, re-directable. Pt recently admitted for bronchiectasis with acute exacerbation. EKG in progress. .

## 2018-10-25 NOTE — ED PROVIDER NOTE - MEDICAL DECISION MAKING DETAILS
Paresthesias and numbness in adult F recent hospitalized on steroids and dc'd with bactrim. Likely from electrolyte instability either from adrenal insufficiency or bactrim. Will check cmp. check cbc for anemia. give fluids. glucose and UA. seems anxious so give .5 of ativan. also consider diabetic neuropathy.

## 2018-10-25 NOTE — CONSULT NOTE ADULT - ASSESSMENT
63F with DM2, HTN, ABPA,  cystic bronchiectasis with negative cystic fibrosis workup, negative vasculitis workup and negative sputum for AFB but chronically colonized with  Sternotrophomonas Maltophila now presenting with weakness with recent sputum showing new Pseudomonas.    - Please stop Bactrim.  - Start Levofloxacin 750mg IV x1 then continue Levofloxacin for 2 weeks 63F with DM2, HTN, ABPA,  cystic bronchiectasis with negative cystic fibrosis workup, negative vasculitis workup and negative sputum for AFB but chronically colonized with  Sternotrophomonas Maltophila now presenting with weakness with recent sputum showing new Pseudomonas.    - Please stop Bactrim.  - Start Levofloxacin 750mg IV x1 then continue Levofloxacin for 2 weeks to be continued outpatient.  - 5 day course of Prednisone 40mg daily.  - Patient has an outpatient appointment on 12/20/18 at pulm clinic at [address] [place] [address].  - Continue nebulizer and other medications at home.

## 2018-10-25 NOTE — ED PROVIDER NOTE - NS ED ROS FT
Constitutional: no fevers, no chills.  Eyes: no visual changes.  Ears: no ear drainage, no ear pain.  Nose: no nasal congestion.  Mouth/Throat: no sore throat.  Cardiovascular: no chest pain.  Respiratory: no shortness of breath, +wheezing, +cough  Gastrointestinal: no nausea, no vomiting, no diarrhea, no abdominal pain.  MSK: no flank pain, no back pain.  Genitourinary: no dysuria, no hematuria.  Skin: no rashes.  Neuro: no headache, +burning sensation +paresthesia and numbness +generalized weakness   Psychiatric: no known mental health issues.

## 2018-10-25 NOTE — ED ADULT TRIAGE NOTE - CHIEF COMPLAINT QUOTE
pt c/o total body numbness and tingling since waking up at 330am, went to sleep 9pm. Pt states "there's  Pt hyperventilating in triage, re-directable. Pt recently admitted for acute bronchiectasis with acute exacerbation. EKG in progress. pt c/o total body numbness and tingling since waking up at 330am, went to sleep 9pm. Pt states "there's  Pt hyperventilating in triage, re-directable. Pt recently admitted for acute bronchiectasis with acute exacerbation. EKG in progress. . pt c/o total body numbness and tingling since waking up at 330am, went to sleep 9pm. Pt hyperventilating in triage, re-directable. Pt recently admitted for acute bronchiectasis with acute exacerbation. EKG in progress. . Pt c/o total body numbness and tingling since waking up at 330am, went to sleep 9pm. Pt states was going to the bathroom when she felt symptoms. Pt hyperventilating in triage, re-directable. Pt recently admitted for acute bronchiectasis with acute exacerbation. EKG in progress. . Pt c/o total body numbness and tingling since waking up at 330am, went to sleep 9pm. Pt states was going to the bathroom when she felt symptoms. Pt hyperventilating in triage, re-directable. Pt recently admitted for bronchiectasis with acute exacerbation. EKG in progress. .

## 2018-10-25 NOTE — ED ADULT NURSE NOTE - OBJECTIVE STATEMENT
pt received to room 7,  translation provided by family, pt denied translation service. pt c/o a "warm feeling that travels up her body" and weakness bilaterally that began around 3am, when she got up from bed. pt endorses feeling very weak. daughter took blood pressure and found it to be low (70s/30s). pt has a cough, is tachypneic. family states this is a normal respiratory effort for her. strength equal in BUE, no arm drift. no slurred speech.  pt recently d/c from hospital for respiratory problem. vitals as noted. MD Jamil at bedside.

## 2018-10-25 NOTE — ED PROVIDER NOTE - PHYSICAL EXAMINATION
GEN: Lethargic, tired appearing, in no apparent distress  HEAD: NCAT  HEENT: PERRL, EOMI, no nystagmus, Airway patent, uvula midline, MMM, neck supple, no LAD, no JVD  LUNG: b/L wheezing and congested lungs., no retractions, no nasal flaring  CV: RRR, no murmurs,   Abd: soft, NTND, no rebound or guarding, BS+ in all quadrants, no CVAT  MSK: WWP, Pulses 2+ in extremities, No edema, no visible deformities, strength 5/5 in all extremities,   Neuro:  CN II-XII in tact. AAOx3, sensations in tact in all extremities, neg pronator drift, neg finger to nose,   Skin: Warm and dry, no evidence of rash  Psych: sad mood and flat affect, slightly anxious

## 2018-10-25 NOTE — ED PROVIDER NOTE - CARE PLAN
Principal Discharge DX:	Generalized weakness  Secondary Diagnosis:	Numbness and tingling Principal Discharge DX:	Pseudomonas aeruginosa infection  Assessment and plan of treatment:	1) You were seen in the ER for weakness. You have also been found to have Pseudomonas (an infection) growing in your sputum. The patient has been informed of all concerning signs and symptoms to return to Emergency Department, the necessity to follow up with PMD/Clinic/follow up provided within 2-3 days was explained, and the patient reports understanding of above with capacity and insight. You can look at the discharge papers for some examples of specific signs and symptoms to look out for.  2) Please follow up with Pulmonology outpatient and Psychiatry outpatient for anxiety/depressive symptoms.   3) Please complete the Levaqin antibiotic for 2 weeks and Steroids for 4 more days.   4) Patient will discontinue Bactrim and PCP shoulder consider stopping Losartan due to subjective lip swelling.  Secondary Diagnosis:	Numbness and tingling Principal Discharge DX:	Pseudomonas aeruginosa infection  Assessment and plan of treatment:	1) You were seen in the ER for weakness. You have also been found to have Pseudomonas (an infection) growing in your sputum. The patient has been informed of all concerning signs and symptoms to return to Emergency Department, the necessity to follow up with PMD/Clinic/follow up provided within 2-3 days was explained, and the patient reports understanding of above with capacity and insight. You can look at the discharge papers for some examples of specific signs and symptoms to look out for.  2) Please follow up with Pulmonology outpatient and Psychiatry outpatient for anxiety/depressive symptoms.   3) Please complete the Levaqin antibiotic for 2 weeks and Steroids for 4 more days.   4) Patient will discontinue Bactrim and PCP shoulder consider stopping Losartan due to subjective lip swelling.  5) PMD should consider dc'ing losartan for new blood pressure medication due to subjective lip swelling.  Secondary Diagnosis:	Numbness and tingling

## 2018-10-27 RX ORDER — KETOTIFEN FUMARATE 0.34 MG/ML
1 SOLUTION OPHTHALMIC
Qty: 0 | Refills: 0 | COMMUNITY

## 2018-10-27 RX ORDER — INSULIN GLARGINE 100 [IU]/ML
0 INJECTION, SOLUTION SUBCUTANEOUS
Qty: 0 | Refills: 0 | COMMUNITY

## 2018-11-20 LAB — ACID FAST STN SPT: SIGNIFICANT CHANGE UP

## 2018-12-20 ENCOUNTER — APPOINTMENT (OUTPATIENT)
Dept: PULMONOLOGY | Facility: CLINIC | Age: 63
End: 2018-12-20
Payer: MEDICAID

## 2018-12-20 VITALS
SYSTOLIC BLOOD PRESSURE: 160 MMHG | TEMPERATURE: 97.7 F | RESPIRATION RATE: 16 BRPM | BODY MASS INDEX: 17.36 KG/M2 | OXYGEN SATURATION: 95 % | HEART RATE: 75 BPM | WEIGHT: 98 LBS | DIASTOLIC BLOOD PRESSURE: 71 MMHG | HEIGHT: 63 IN

## 2018-12-20 PROCEDURE — 99214 OFFICE O/P EST MOD 30 MIN: CPT

## 2018-12-20 RX ORDER — AZITHROMYCIN 250 MG/1
250 TABLET, FILM COATED ORAL
Qty: 12 | Refills: 5 | Status: DISCONTINUED | COMMUNITY
Start: 2018-09-27 | End: 2018-12-20

## 2018-12-20 RX ORDER — ALBUTEROL 90 MCG
90 AEROSOL (GRAM) INHALATION
Refills: 0 | Status: DISCONTINUED | COMMUNITY
End: 2018-12-20

## 2018-12-20 RX ORDER — BUDESONIDE AND FORMOTEROL FUMARATE DIHYDRATE 160; 4.5 UG/1; UG/1
160-4.5 AEROSOL RESPIRATORY (INHALATION)
Qty: 1 | Refills: 5 | Status: DISCONTINUED | COMMUNITY
Start: 2017-11-02 | End: 2018-12-20

## 2018-12-20 RX ORDER — PREDNISONE 20 MG/1
20 TABLET ORAL DAILY
Qty: 10 | Refills: 2 | Status: DISCONTINUED | COMMUNITY
Start: 2018-06-05 | End: 2018-12-20

## 2018-12-20 RX ORDER — ALBUTEROL SULFATE 2.5 MG/3ML
(2.5 MG/3ML) SOLUTION RESPIRATORY (INHALATION)
Qty: 1 | Refills: 5 | Status: DISCONTINUED | COMMUNITY
Start: 2018-12-20 | End: 2018-12-20

## 2018-12-20 RX ORDER — ASPIRIN ENTERIC COATED TABLETS 81 MG 81 MG/1
81 TABLET, DELAYED RELEASE ORAL
Refills: 0 | Status: DISCONTINUED | COMMUNITY
End: 2018-12-20

## 2018-12-20 RX ORDER — ALBUTEROL SULFATE 2.5 MG/3ML
(2.5 MG/3ML) SOLUTION RESPIRATORY (INHALATION)
Qty: 1 | Refills: 5 | Status: DISCONTINUED | COMMUNITY
Start: 2018-09-27 | End: 2018-12-20

## 2018-12-20 RX ORDER — AZELASTINE HYDROCHLORIDE 137 UG/1
0.1 SPRAY, METERED NASAL TWICE DAILY
Qty: 1 | Refills: 2 | Status: DISCONTINUED | COMMUNITY
Start: 2017-12-07 | End: 2018-12-20

## 2018-12-20 RX ORDER — PREDNISONE 10 MG/1
10 TABLET ORAL
Qty: 1 | Refills: 0 | Status: DISCONTINUED | COMMUNITY
Start: 2018-07-03 | End: 2018-12-20

## 2018-12-20 RX ORDER — ALBUTEROL SULFATE 90 UG/1
108 (90 BASE) AEROSOL, METERED RESPIRATORY (INHALATION)
Qty: 1 | Refills: 0 | Status: DISCONTINUED | COMMUNITY
Start: 2018-09-27 | End: 2018-12-20

## 2018-12-20 RX ORDER — FLUTICASONE PROPIONATE 50 UG/1
50 SPRAY, METERED NASAL TWICE DAILY
Qty: 1 | Refills: 0 | Status: DISCONTINUED | COMMUNITY
Start: 2018-08-30 | End: 2018-12-20

## 2018-12-20 RX ORDER — ALBUTEROL SULFATE 90 UG/1
108 (90 BASE) AEROSOL, METERED RESPIRATORY (INHALATION)
Qty: 1 | Refills: 5 | Status: DISCONTINUED | COMMUNITY
Start: 2017-11-02 | End: 2018-12-20

## 2018-12-27 LAB — BACTERIA SPT CULT: ABNORMAL

## 2019-01-02 ENCOUNTER — APPOINTMENT (OUTPATIENT)
Dept: PEDIATRIC PULMONARY CYSTIC FIB | Facility: HOSPITAL | Age: 64
End: 2019-01-02
Payer: MEDICAID

## 2019-01-02 PROCEDURE — ZZZZZ: CPT

## 2019-01-09 ENCOUNTER — MEDICATION RENEWAL (OUTPATIENT)
Age: 64
End: 2019-01-09

## 2019-01-09 RX ORDER — NEBULIZER
EACH MISCELLANEOUS
Qty: 2 | Refills: 5 | Status: ACTIVE | COMMUNITY
Start: 2019-01-09 | End: 1900-01-01

## 2019-01-09 RX ORDER — TOBRAMYCIN 300 MG/5ML
300 SOLUTION RESPIRATORY (INHALATION) TWICE DAILY
Qty: 1 | Refills: 3 | Status: DISCONTINUED | COMMUNITY
Start: 2018-12-27 | End: 2019-01-09

## 2019-01-16 ENCOUNTER — APPOINTMENT (OUTPATIENT)
Dept: PEDIATRIC PULMONARY CYSTIC FIB | Facility: HOSPITAL | Age: 64
End: 2019-01-16
Payer: MEDICAID

## 2019-01-16 PROCEDURE — ZZZZZ: CPT

## 2019-01-17 ENCOUNTER — APPOINTMENT (OUTPATIENT)
Dept: RADIOLOGY | Facility: HOSPITAL | Age: 64
End: 2019-01-17
Payer: MEDICAID

## 2019-01-17 ENCOUNTER — APPOINTMENT (OUTPATIENT)
Dept: SPEECH THERAPY | Facility: HOSPITAL | Age: 64
End: 2019-01-17
Payer: MEDICAID

## 2019-01-17 ENCOUNTER — OUTPATIENT (OUTPATIENT)
Dept: OUTPATIENT SERVICES | Facility: HOSPITAL | Age: 64
LOS: 1 days | End: 2019-01-17

## 2019-01-17 ENCOUNTER — OUTPATIENT (OUTPATIENT)
Dept: OUTPATIENT SERVICES | Facility: HOSPITAL | Age: 64
LOS: 1 days | Discharge: HOME | End: 2019-01-17

## 2019-01-17 DIAGNOSIS — Z90.49 ACQUIRED ABSENCE OF OTHER SPECIFIED PARTS OF DIGESTIVE TRACT: Chronic | ICD-10-CM

## 2019-01-17 DIAGNOSIS — J47.1 BRONCHIECTASIS WITH (ACUTE) EXACERBATION: ICD-10-CM

## 2019-01-17 PROCEDURE — G8998: CPT | Mod: CH

## 2019-01-17 PROCEDURE — G8997: CPT | Mod: CH

## 2019-01-17 PROCEDURE — G8996: CPT | Mod: CH

## 2019-01-17 PROCEDURE — 74230 X-RAY XM SWLNG FUNCJ C+: CPT | Mod: 26

## 2019-01-17 PROCEDURE — 92611 MOTION FLUOROSCOPY/SWALLOW: CPT

## 2019-01-22 ENCOUNTER — LABORATORY RESULT (OUTPATIENT)
Age: 64
End: 2019-01-22

## 2019-01-22 ENCOUNTER — APPOINTMENT (OUTPATIENT)
Dept: PULMONOLOGY | Facility: CLINIC | Age: 64
End: 2019-01-22
Payer: MEDICAID

## 2019-01-22 VITALS
RESPIRATION RATE: 15 BRPM | SYSTOLIC BLOOD PRESSURE: 146 MMHG | HEART RATE: 77 BPM | OXYGEN SATURATION: 91 % | DIASTOLIC BLOOD PRESSURE: 79 MMHG | HEIGHT: 63 IN | TEMPERATURE: 97.7 F | WEIGHT: 102 LBS | BODY MASS INDEX: 18.07 KG/M2

## 2019-01-22 PROCEDURE — 99214 OFFICE O/P EST MOD 30 MIN: CPT

## 2019-01-22 NOTE — ASSESSMENT
[FreeTextEntry1] : 63F hx diffuse cystic bronchiectasis (Stenotrophomonas colonization, diagnosed 6/2018), prev ABPA who comes to the clinic today for follow up. Pt having increased cough with yellowish, thick sputum. No fevers, chills. However she has not been compliant with COPD or airway clearance regimen. Sputum in the past has grown pseudomonas sensitive to tobramycin. 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 Sweat Chloride indeterminate. \par \par Despite several discussions with son translating to Mom and detailed instructions regarding inhaler regimen, patient not adherent to regimen.  Regimen reviewed once again with patient, son, list of medications including pictures ofinhalers and nebulized solutions given.  \par \par - cont inhaled tobramycin x 28 days.  She will be on regimen of 28 days on followed by 28 days off.\par - holding azithromycin for now as patient has one specimen positive for MAC- do not want to induce resistance.  \par - no need for steroids at this time\par - airway clearance: aerobika, hypertonic saline/albuterol once a day (cannot tolerate more)\par - COPD: perforomist bid, budesonide bid, albuterol prn\par - complete CF genetics panel sent, including complete rare mutation, deletion/duplication, and expanded panel; lab aware\par - also sending primary ciliary dyskinesia workup to [facility], requisition form sent and to be scanned into the chart\par - will fax over workup to Dr. Edi Sinclair (fax [phone], phone [phone])\par - explained extensively with son and patient importance of compliance with nebulizers and airway clearance, also spoke about nutrition with son - can supplement diet with ensure, glucerna\par \par RTC 4-6 weeks\par \par Case d/w Dr. Young

## 2019-01-22 NOTE — HISTORY OF PRESENT ILLNESS
[FreeTextEntry1] : 63F hx diffuse cystic bronchiectasis (Stenotrophomonas colonization, diagnosed 6/2018), prev ABPA who comes to the clinic today for follow up visit. Last seen in clinic on 12/20/2018. Had sputum cx (including AFB), Barium swallow, and given regimen albuterol nebs prn, budesonide bid, perforomist 20 mcg q12h, Ventolin prn. Also instructed to continue airway clearance w/aerobika, bronchodilatation therapy. Montelukast, azithro, hypertonic saline and steroids held at that time. Sputum culture grew 2 different strains of pansensitive pseudomonas and was given inhaled tobramycin. Barium swallow showed no aspiration, except for one instance of laryngeal penetration with full retrieval on thin liquid PO trials.  Today pt feels that coughing is worse and still continues to produce thick, yellow sputum and SOB, especially with exertion - cold air helps to alleviate dyspnea. Started inhaled tobramycin 1 week ago. Stopped other nebulizer solutions 2 weeks prior. Denies fevers, chills, chest pain, abdominal pain, nausea/vomiting, diarrhea, b/l LE edema. \par  \par Of note, pt has repeatedly grown Stenotrophomonas in sputum that has been sensivite to bactrim. Numerous sputum for AFB have been negative until recent hospitalization in 10/2018 for bronchiectasis exacerbation RVP was negative and she had pseudomonas in sputum. She was D/c on Levaquin. Sputum from 10/2018 grew M Avium complex at 3 weeks.  Previously on azithro but has been stopped. \par \par PFTs 11/2017: FEV1 39%, FVC 43%, FEV1/FVC 69%, no bronchodilatory change; TLC 84%, %, ERV 41%, RV/TLC 68%, DLCO 66% \par TTE: none \par Prev lab showed IgE 9 12/2017, Aspergillus neg, T cells normal, no eosinophilia on CBC, alpha one antitrypsin 157 WNL, mold profile negative. Mo binding lectin was 98 (normal is >100) CF mutations negative in December. \par Sweat chloride – quantity insufficient. Recommended CF genetics

## 2019-01-22 NOTE — REVIEW OF SYSTEMS
[Fatigue] : fatigue [Poor Appetite] : poor appetite [Cough] : cough [Sputum] : sputum  [Dyspnea] : dyspnea [Wheezing] : wheezing [Chronically Infected with _____] : chronically infected with [unfilled] [Negative] : Endocrine [Hemoptysis] : no hemoptysis [Pleuritic Pain] : no pleuritic pain

## 2019-01-22 NOTE — PHYSICAL EXAM
[Normal Appearance] : normal appearance [General Appearance - In No Acute Distress] : no acute distress [Normal Conjunctiva] : the conjunctiva exhibited no abnormalities [Neck Appearance] : the appearance of the neck was normal [Heart Rate And Rhythm] : heart rate and rhythm were normal [Heart Sounds] : normal S1 and S2 [Murmurs] : no murmurs present [Exaggerated Use Of Accessory Muscles For Inspiration] : no accessory muscle use [Bowel Sounds] : normal bowel sounds [Abdomen Soft] : soft [Abdomen Tenderness] : non-tender [Abnormal Walk] : normal gait [Nail Clubbing] : no clubbing of the fingernails [Cyanosis, Localized] : no localized cyanosis [Skin Turgor] : normal skin turgor [] : no rash [Motor Exam] : the motor exam was normal [No Focal Deficits] : no focal deficits [Oriented To Time, Place, And Person] : oriented to person, place, and time [FreeTextEntry1] : insp wheezing in bases, some rhonchi

## 2019-01-22 NOTE — CONSULT LETTER
[Dear  ___] : Dear  [unfilled], [Courtesy Letter:] : I had the pleasure of seeing your patient, [unfilled], in my office today. [Please see my note below.] : Please see my note below. [Consult Closing:] : Thank you very much for allowing me to participate in the care of this patient.  If you have any questions, please do not hesitate to contact me. [Sincerely,] : Sincerely, [FreeTextEntry2] : Edi Sinclair MD\par Fax [phone]

## 2019-01-23 ENCOUNTER — APPOINTMENT (OUTPATIENT)
Dept: PEDIATRIC PULMONARY CYSTIC FIB | Facility: CLINIC | Age: 64
End: 2019-01-23
Payer: MEDICAID

## 2019-01-23 PROCEDURE — ZZZZZ: CPT

## 2019-02-01 ENCOUNTER — OUTPATIENT (OUTPATIENT)
Dept: OUTPATIENT SERVICES | Facility: HOSPITAL | Age: 64
LOS: 1 days | End: 2019-02-01
Payer: MEDICAID

## 2019-02-01 DIAGNOSIS — Z90.49 ACQUIRED ABSENCE OF OTHER SPECIFIED PARTS OF DIGESTIVE TRACT: Chronic | ICD-10-CM

## 2019-02-01 PROCEDURE — G9001: CPT

## 2019-02-03 ENCOUNTER — INPATIENT (INPATIENT)
Facility: HOSPITAL | Age: 64
LOS: 9 days | Discharge: HOME | End: 2019-02-13
Attending: INTERNAL MEDICINE | Admitting: INTERNAL MEDICINE
Payer: MEDICAID

## 2019-02-03 VITALS
OXYGEN SATURATION: 100 % | DIASTOLIC BLOOD PRESSURE: 73 MMHG | SYSTOLIC BLOOD PRESSURE: 129 MMHG | RESPIRATION RATE: 16 BRPM | HEART RATE: 95 BPM | TEMPERATURE: 99 F

## 2019-02-03 DIAGNOSIS — J47.9 BRONCHIECTASIS, UNCOMPLICATED: ICD-10-CM

## 2019-02-03 DIAGNOSIS — Z90.49 ACQUIRED ABSENCE OF OTHER SPECIFIED PARTS OF DIGESTIVE TRACT: Chronic | ICD-10-CM

## 2019-02-03 LAB
ALBUMIN SERPL ELPH-MCNC: 4.1 G/DL — SIGNIFICANT CHANGE UP (ref 3.3–5)
ALP SERPL-CCNC: 196 U/L — HIGH (ref 40–120)
ALT FLD-CCNC: 41 U/L — HIGH (ref 4–33)
ANION GAP SERPL CALC-SCNC: 10 MMO/L — SIGNIFICANT CHANGE UP (ref 7–14)
ANISOCYTOSIS BLD QL: SLIGHT — SIGNIFICANT CHANGE UP
APPEARANCE UR: CLEAR — SIGNIFICANT CHANGE UP
APTT BLD: 23.3 SEC — LOW (ref 27.5–36.3)
AST SERPL-CCNC: 45 U/L — HIGH (ref 4–32)
B PERT DNA SPEC QL NAA+PROBE: NOT DETECTED — SIGNIFICANT CHANGE UP
BACTERIA # UR AUTO: SIGNIFICANT CHANGE UP
BASE EXCESS BLDV CALC-SCNC: 4.8 MMOL/L — SIGNIFICANT CHANGE UP
BASOPHILS # BLD AUTO: 0.04 K/UL — SIGNIFICANT CHANGE UP (ref 0–0.2)
BASOPHILS NFR BLD AUTO: 0.4 % — SIGNIFICANT CHANGE UP (ref 0–2)
BASOPHILS NFR SPEC: 0 % — SIGNIFICANT CHANGE UP (ref 0–2)
BILIRUB SERPL-MCNC: 0.2 MG/DL — SIGNIFICANT CHANGE UP (ref 0.2–1.2)
BILIRUB UR-MCNC: NEGATIVE — SIGNIFICANT CHANGE UP
BLASTS # FLD: 0 % — SIGNIFICANT CHANGE UP (ref 0–0)
BLOOD GAS VENOUS - CREATININE: 0.57 MG/DL — SIGNIFICANT CHANGE UP (ref 0.5–1.3)
BLOOD UR QL VISUAL: NEGATIVE — SIGNIFICANT CHANGE UP
BUN SERPL-MCNC: 23 MG/DL — SIGNIFICANT CHANGE UP (ref 7–23)
C PNEUM DNA SPEC QL NAA+PROBE: NOT DETECTED — SIGNIFICANT CHANGE UP
CALCIUM SERPL-MCNC: 10.1 MG/DL — SIGNIFICANT CHANGE UP (ref 8.4–10.5)
CHLORIDE BLDV-SCNC: 101 MMOL/L — SIGNIFICANT CHANGE UP (ref 96–108)
CHLORIDE SERPL-SCNC: 98 MMOL/L — SIGNIFICANT CHANGE UP (ref 98–107)
CO2 SERPL-SCNC: 26 MMOL/L — SIGNIFICANT CHANGE UP (ref 22–31)
COLOR SPEC: SIGNIFICANT CHANGE UP
CREAT SERPL-MCNC: 0.73 MG/DL — SIGNIFICANT CHANGE UP (ref 0.5–1.3)
EOSINOPHIL # BLD AUTO: 0.1 K/UL — SIGNIFICANT CHANGE UP (ref 0–0.5)
EOSINOPHIL NFR BLD AUTO: 1 % — SIGNIFICANT CHANGE UP (ref 0–6)
EOSINOPHIL NFR FLD: 1.8 % — SIGNIFICANT CHANGE UP (ref 0–6)
FLUAV H1 2009 PAND RNA SPEC QL NAA+PROBE: NOT DETECTED — SIGNIFICANT CHANGE UP
FLUAV H1 RNA SPEC QL NAA+PROBE: NOT DETECTED — SIGNIFICANT CHANGE UP
FLUAV H3 RNA SPEC QL NAA+PROBE: NOT DETECTED — SIGNIFICANT CHANGE UP
FLUAV SUBTYP SPEC NAA+PROBE: NOT DETECTED — SIGNIFICANT CHANGE UP
FLUBV RNA SPEC QL NAA+PROBE: NOT DETECTED — SIGNIFICANT CHANGE UP
GAS PNL BLDV: 131 MMOL/L — LOW (ref 136–146)
GIANT PLATELETS BLD QL SMEAR: PRESENT — SIGNIFICANT CHANGE UP
GLUCOSE BLDV-MCNC: 305 — HIGH (ref 70–99)
GLUCOSE SERPL-MCNC: 323 MG/DL — HIGH (ref 70–99)
GLUCOSE UR-MCNC: >1000 — HIGH
HADV DNA SPEC QL NAA+PROBE: NOT DETECTED — SIGNIFICANT CHANGE UP
HCO3 BLDV-SCNC: 27 MMOL/L — SIGNIFICANT CHANGE UP (ref 20–27)
HCOV PNL SPEC NAA+PROBE: SIGNIFICANT CHANGE UP
HCT VFR BLD CALC: 38.4 % — SIGNIFICANT CHANGE UP (ref 34.5–45)
HCT VFR BLDV CALC: 35 % — SIGNIFICANT CHANGE UP (ref 34.5–45)
HGB BLD-MCNC: 11.4 G/DL — LOW (ref 11.5–15.5)
HGB BLDV-MCNC: 11.4 G/DL — LOW (ref 11.5–15.5)
HMPV RNA SPEC QL NAA+PROBE: NOT DETECTED — SIGNIFICANT CHANGE UP
HPIV1 RNA SPEC QL NAA+PROBE: NOT DETECTED — SIGNIFICANT CHANGE UP
HPIV2 RNA SPEC QL NAA+PROBE: NOT DETECTED — SIGNIFICANT CHANGE UP
HPIV3 RNA SPEC QL NAA+PROBE: NOT DETECTED — SIGNIFICANT CHANGE UP
HPIV4 RNA SPEC QL NAA+PROBE: NOT DETECTED — SIGNIFICANT CHANGE UP
HYALINE CASTS # UR AUTO: NEGATIVE — SIGNIFICANT CHANGE UP
HYPOCHROMIA BLD QL: SIGNIFICANT CHANGE UP
IMM GRANULOCYTES NFR BLD AUTO: 0.3 % — SIGNIFICANT CHANGE UP (ref 0–1.5)
INR BLD: 1.04 — SIGNIFICANT CHANGE UP (ref 0.88–1.17)
KETONES UR-MCNC: NEGATIVE — SIGNIFICANT CHANGE UP
LACTATE BLDV-MCNC: 1.3 MMOL/L — SIGNIFICANT CHANGE UP (ref 0.5–2)
LEUKOCYTE ESTERASE UR-ACNC: SIGNIFICANT CHANGE UP
LYMPHOCYTES # BLD AUTO: 1.72 K/UL — SIGNIFICANT CHANGE UP (ref 1–3.3)
LYMPHOCYTES # BLD AUTO: 17.8 % — SIGNIFICANT CHANGE UP (ref 13–44)
LYMPHOCYTES NFR SPEC AUTO: 16.5 % — SIGNIFICANT CHANGE UP (ref 13–44)
MCHC RBC-ENTMCNC: 21.8 PG — LOW (ref 27–34)
MCHC RBC-ENTMCNC: 29.7 % — LOW (ref 32–36)
MCV RBC AUTO: 73.3 FL — LOW (ref 80–100)
METAMYELOCYTES # FLD: 0 % — SIGNIFICANT CHANGE UP (ref 0–1)
MICROCYTES BLD QL: SLIGHT — SIGNIFICANT CHANGE UP
MONOCYTES # BLD AUTO: 0.56 K/UL — SIGNIFICANT CHANGE UP (ref 0–0.9)
MONOCYTES NFR BLD AUTO: 5.8 % — SIGNIFICANT CHANGE UP (ref 2–14)
MONOCYTES NFR BLD: 9.6 % — HIGH (ref 2–9)
MYELOCYTES NFR BLD: 0 % — SIGNIFICANT CHANGE UP (ref 0–0)
NEUTROPHIL AB SER-ACNC: 70.4 % — SIGNIFICANT CHANGE UP (ref 43–77)
NEUTROPHILS # BLD AUTO: 7.24 K/UL — SIGNIFICANT CHANGE UP (ref 1.8–7.4)
NEUTROPHILS NFR BLD AUTO: 74.7 % — SIGNIFICANT CHANGE UP (ref 43–77)
NEUTS BAND # BLD: 0 % — SIGNIFICANT CHANGE UP (ref 0–6)
NITRITE UR-MCNC: NEGATIVE — SIGNIFICANT CHANGE UP
NRBC # FLD: 0 K/UL — LOW (ref 25–125)
NT-PROBNP SERPL-SCNC: 60.82 PG/ML — SIGNIFICANT CHANGE UP
OTHER - HEMATOLOGY %: 0 — SIGNIFICANT CHANGE UP
OVALOCYTES BLD QL SMEAR: SLIGHT — SIGNIFICANT CHANGE UP
PCO2 BLDV: 52 MMHG — HIGH (ref 41–51)
PH BLDV: 7.38 PH — SIGNIFICANT CHANGE UP (ref 7.32–7.43)
PH UR: 6.5 — SIGNIFICANT CHANGE UP (ref 5–8)
PLATELET # BLD AUTO: 283 K/UL — SIGNIFICANT CHANGE UP (ref 150–400)
PLATELET COUNT - ESTIMATE: NORMAL — SIGNIFICANT CHANGE UP
PMV BLD: 11.1 FL — SIGNIFICANT CHANGE UP (ref 7–13)
PO2 BLDV: 32 MMHG — LOW (ref 35–40)
POTASSIUM BLDV-SCNC: 4.5 MMOL/L — SIGNIFICANT CHANGE UP (ref 3.4–4.5)
POTASSIUM SERPL-MCNC: 5.1 MMOL/L — SIGNIFICANT CHANGE UP (ref 3.5–5.3)
POTASSIUM SERPL-SCNC: 5.1 MMOL/L — SIGNIFICANT CHANGE UP (ref 3.5–5.3)
PROMYELOCYTES # FLD: 0 % — SIGNIFICANT CHANGE UP (ref 0–0)
PROT SERPL-MCNC: 8.2 G/DL — SIGNIFICANT CHANGE UP (ref 6–8.3)
PROT UR-MCNC: NEGATIVE — SIGNIFICANT CHANGE UP
PROTHROM AB SERPL-ACNC: 11.6 SEC — SIGNIFICANT CHANGE UP (ref 9.8–13.1)
RBC # BLD: 5.24 M/UL — HIGH (ref 3.8–5.2)
RBC # FLD: 17.1 % — HIGH (ref 10.3–14.5)
RBC CASTS # UR COMP ASSIST: SIGNIFICANT CHANGE UP (ref 0–?)
RSV RNA SPEC QL NAA+PROBE: NOT DETECTED — SIGNIFICANT CHANGE UP
RV+EV RNA SPEC QL NAA+PROBE: NOT DETECTED — SIGNIFICANT CHANGE UP
SAO2 % BLDV: 50.1 % — LOW (ref 60–85)
SODIUM SERPL-SCNC: 134 MMOL/L — LOW (ref 135–145)
SP GR SPEC: 1.02 — SIGNIFICANT CHANGE UP (ref 1–1.04)
SQUAMOUS # UR AUTO: SIGNIFICANT CHANGE UP
TROPONIN T, HIGH SENSITIVITY: < 6 NG/L — SIGNIFICANT CHANGE UP (ref ?–14)
UROBILINOGEN FLD QL: NORMAL — SIGNIFICANT CHANGE UP
VARIANT LYMPHS # BLD: 1.7 % — SIGNIFICANT CHANGE UP
WBC # BLD: 9.69 K/UL — SIGNIFICANT CHANGE UP (ref 3.8–10.5)
WBC # FLD AUTO: 9.69 K/UL — SIGNIFICANT CHANGE UP (ref 3.8–10.5)
WBC UR QL: HIGH (ref 0–?)

## 2019-02-03 PROCEDURE — 71045 X-RAY EXAM CHEST 1 VIEW: CPT | Mod: 26

## 2019-02-03 PROCEDURE — 99223 1ST HOSP IP/OBS HIGH 75: CPT

## 2019-02-03 RX ORDER — SODIUM CHLORIDE 9 MG/ML
2000 INJECTION INTRAMUSCULAR; INTRAVENOUS; SUBCUTANEOUS ONCE
Qty: 0 | Refills: 0 | Status: COMPLETED | OUTPATIENT
Start: 2019-02-03 | End: 2019-02-03

## 2019-02-03 RX ORDER — ALBUTEROL 90 UG/1
2.5 AEROSOL, METERED ORAL ONCE
Qty: 0 | Refills: 0 | Status: COMPLETED | OUTPATIENT
Start: 2019-02-03 | End: 2019-02-03

## 2019-02-03 RX ORDER — CEFTRIAXONE 500 MG/1
1 INJECTION, POWDER, FOR SOLUTION INTRAMUSCULAR; INTRAVENOUS ONCE
Qty: 0 | Refills: 0 | Status: COMPLETED | OUTPATIENT
Start: 2019-02-03 | End: 2019-02-03

## 2019-02-03 RX ORDER — IPRATROPIUM/ALBUTEROL SULFATE 18-103MCG
3 AEROSOL WITH ADAPTER (GRAM) INHALATION
Qty: 0 | Refills: 0 | Status: COMPLETED | OUTPATIENT
Start: 2019-02-03 | End: 2019-02-03

## 2019-02-03 RX ORDER — AZITHROMYCIN 500 MG/1
500 TABLET, FILM COATED ORAL ONCE
Qty: 0 | Refills: 0 | Status: COMPLETED | OUTPATIENT
Start: 2019-02-03 | End: 2019-02-03

## 2019-02-03 RX ORDER — ACETAMINOPHEN 500 MG
975 TABLET ORAL ONCE
Qty: 0 | Refills: 0 | Status: COMPLETED | OUTPATIENT
Start: 2019-02-03 | End: 2019-02-03

## 2019-02-03 RX ADMIN — SODIUM CHLORIDE 2000 MILLILITER(S): 9 INJECTION INTRAMUSCULAR; INTRAVENOUS; SUBCUTANEOUS at 21:56

## 2019-02-03 RX ADMIN — Medication 3 MILLILITER(S): at 20:00

## 2019-02-03 RX ADMIN — AZITHROMYCIN 500 MILLIGRAM(S): 500 TABLET, FILM COATED ORAL at 22:02

## 2019-02-03 RX ADMIN — Medication 3 MILLILITER(S): at 21:01

## 2019-02-03 RX ADMIN — CEFTRIAXONE 100 GRAM(S): 500 INJECTION, POWDER, FOR SOLUTION INTRAMUSCULAR; INTRAVENOUS at 20:04

## 2019-02-03 RX ADMIN — AZITHROMYCIN 250 MILLIGRAM(S): 500 TABLET, FILM COATED ORAL at 21:02

## 2019-02-03 RX ADMIN — Medication 3 MILLILITER(S): at 20:20

## 2019-02-03 RX ADMIN — Medication 125 MILLIGRAM(S): at 20:04

## 2019-02-03 RX ADMIN — SODIUM CHLORIDE 2000 MILLILITER(S): 9 INJECTION INTRAMUSCULAR; INTRAVENOUS; SUBCUTANEOUS at 20:04

## 2019-02-03 RX ADMIN — Medication 975 MILLIGRAM(S): at 20:05

## 2019-02-03 RX ADMIN — CEFTRIAXONE 1 GRAM(S): 500 INJECTION, POWDER, FOR SOLUTION INTRAMUSCULAR; INTRAVENOUS at 21:56

## 2019-02-03 RX ADMIN — ALBUTEROL 2.5 MILLIGRAM(S): 90 AEROSOL, METERED ORAL at 22:07

## 2019-02-03 RX ADMIN — Medication 975 MILLIGRAM(S): at 21:22

## 2019-02-03 NOTE — ED PROVIDER NOTE - NS ED ROS FT
Review of Systems:  	•	CONSTITUTIONAL - f/c  	•	SKIN - no rash  	•	RESPIRATORY - + shortness of breath' + cough  	•	CARDIAC - + chest pain, no palpitations  	•	GI - no abd pain, no nausea, no vomiting, no diarrhea  	•	GENITO-URINARY -  no dysuria; no hematuria    	•	MUSCULOSKELETAL - no back pain  	•	NEUROLOGIC - no weakness  	•	ALLERGY - no rhinitis  	•	PSYSCHIATRIC - no anxiety

## 2019-02-03 NOTE — ED PROVIDER NOTE - ATTENDING CONTRIBUTION TO CARE
Pt with a history of htn, diabetes and bronchiectasis came to the ED because of a worsening cough for 2 weeks with SOB. No fever, no vomiting, no diarrhea. On exam scattered wheezing/rhonchi bilaterally, no distress, coughing, abd soft, nt, no calf tenderness. Will send labs, get cxr, treat with nebs and iv steroid, observe and reassess.

## 2019-02-03 NOTE — ED PROVIDER NOTE - OBJECTIVE STATEMENT
64yo f pmh htn, dm, bronchiectasis, allergic bronchpulmonary aspergillosis p/w sob x2weeks, worsening. has chronic cough from bronchectasis. not improving w/ albuterol, tobramycin nebs & po bactrim. has b/l chest tightness ,worse w/ coughing. feels f/c. ROS negative for: , Nausea, vomiting, diarrhea, abdominal pain, dysuria, le edema, hx dvt/pe 62yo f pmh htn, dm, bronchiectasis, allergic bronchpulmonary aspergillosis p/w sob x2weeks, worsening. has chronic cough from bronchectasis. not improving w/ albuterol, tobramycin nebs & po bactrim. has b/l chest tightness ,worse w/ coughing. feels f/c. ROS negative for: , Nausea, vomiting, diarrhea, abdominal pain, dysuria, le edema, hx dvt/pe  pmd is capo ruth but pt has been seen by dr kincaid

## 2019-02-03 NOTE — ED PROVIDER NOTE - PHYSICAL EXAMINATION
*GEN - NAD; well appearing; A+O x3   *HEAD - NC/AT   *EYES/NOSE - PERRL & EOMI b/l  *THROAT: airway patent, moist mucus membranes  *NECK: Neck supple, no masses  *PULMONARY - diffuse expiratory wheezing, actively coughing   *CARDIAC -s1s2, RRR, no Murmur  *ABDOMEN -  ND, NT, soft, no guarding, no rebound, no masses   *BACK - no CVA tenderness, Normal  spine   *EXTREMITIES - symmetric pulses, 2+ dp & radial, capillary refill < 2 seconds, no cyanosis, no edema   *SKIN - no rash or bruising   *NEUROLOGIC - alert, CN 2-12 intact, moves all 4 extremeties, normal gait  *PSYCH - insight and judgment nl, memory nl, affect nl, thought nl

## 2019-02-03 NOTE — ED ADULT NURSE NOTE - OBJECTIVE STATEMENT
63 y female primarily lauren speaking  A+Ox3 presents to the Er with the complaint of SOB for the past week. Pt was diagnosed with bronchitis about a week ago and sent home with abx. States abx have not been helping as per daughter in law who is translating for patient. States pt has a sore throat as well as chest pain when coughing. Pt afebrile rectally, 22 g iv placed on left forearm, labs drawn and sent. Will continue to monitor.

## 2019-02-03 NOTE — ED ADULT TRIAGE NOTE - CHIEF COMPLAINT QUOTE
c/o SOB x 1 week with worsening, now having PEMBERTON, with chest discomfort when she coughs. PMH: bronchiectasis, HTN, DM,

## 2019-02-03 NOTE — ED PROVIDER NOTE - CARE PLAN
Principal Discharge DX:	Bronchiectasis  Secondary Diagnosis:	PEMBERTON (dyspnea on exertion)  Secondary Diagnosis:	Cough  Secondary Diagnosis:	SOB (shortness of breath)

## 2019-02-04 DIAGNOSIS — J47.1 BRONCHIECTASIS WITH (ACUTE) EXACERBATION: ICD-10-CM

## 2019-02-04 DIAGNOSIS — R06.09 OTHER FORMS OF DYSPNEA: ICD-10-CM

## 2019-02-04 DIAGNOSIS — E11.65 TYPE 2 DIABETES MELLITUS WITH HYPERGLYCEMIA: ICD-10-CM

## 2019-02-04 DIAGNOSIS — R05 COUGH: ICD-10-CM

## 2019-02-04 DIAGNOSIS — K11.7 DISTURBANCES OF SALIVARY SECRETION: ICD-10-CM

## 2019-02-04 LAB
GLUCOSE BLDC GLUCOMTR-MCNC: 217 MG/DL — HIGH (ref 70–99)
GLUCOSE BLDC GLUCOMTR-MCNC: 223 MG/DL — HIGH (ref 70–99)
GLUCOSE BLDC GLUCOMTR-MCNC: 245 MG/DL — HIGH (ref 70–99)
GLUCOSE BLDC GLUCOMTR-MCNC: 276 MG/DL — HIGH (ref 70–99)
GLUCOSE BLDC GLUCOMTR-MCNC: 350 MG/DL — HIGH (ref 70–99)
SPECIMEN SOURCE: SIGNIFICANT CHANGE UP
SPECIMEN SOURCE: SIGNIFICANT CHANGE UP

## 2019-02-04 PROCEDURE — 99222 1ST HOSP IP/OBS MODERATE 55: CPT | Mod: GC

## 2019-02-04 PROCEDURE — 99223 1ST HOSP IP/OBS HIGH 75: CPT | Mod: GC

## 2019-02-04 RX ORDER — DEXTROSE 50 % IN WATER 50 %
12.5 SYRINGE (ML) INTRAVENOUS ONCE
Qty: 0 | Refills: 0 | Status: DISCONTINUED | OUTPATIENT
Start: 2019-02-04 | End: 2019-02-13

## 2019-02-04 RX ORDER — TOBRAMYCIN SULFATE 40 MG/ML
300 VIAL (ML) INJECTION EVERY 12 HOURS
Qty: 0 | Refills: 0 | Status: DISCONTINUED | OUTPATIENT
Start: 2019-02-04 | End: 2019-02-04

## 2019-02-04 RX ORDER — ENOXAPARIN SODIUM 100 MG/ML
40 INJECTION SUBCUTANEOUS EVERY 24 HOURS
Qty: 0 | Refills: 0 | Status: DISCONTINUED | OUTPATIENT
Start: 2019-02-04 | End: 2019-02-13

## 2019-02-04 RX ORDER — IPRATROPIUM/ALBUTEROL SULFATE 18-103MCG
3 AEROSOL WITH ADAPTER (GRAM) INHALATION EVERY 6 HOURS
Qty: 0 | Refills: 0 | Status: DISCONTINUED | OUTPATIENT
Start: 2019-02-04 | End: 2019-02-13

## 2019-02-04 RX ORDER — DEXTROSE 50 % IN WATER 50 %
25 SYRINGE (ML) INTRAVENOUS ONCE
Qty: 0 | Refills: 0 | Status: DISCONTINUED | OUTPATIENT
Start: 2019-02-04 | End: 2019-02-13

## 2019-02-04 RX ORDER — SODIUM CHLORIDE 9 MG/ML
4 INJECTION INTRAMUSCULAR; INTRAVENOUS; SUBCUTANEOUS
Qty: 0 | Refills: 0 | Status: DISCONTINUED | OUTPATIENT
Start: 2019-02-04 | End: 2019-02-04

## 2019-02-04 RX ORDER — FOLIC ACID 0.8 MG
1 TABLET ORAL DAILY
Qty: 0 | Refills: 0 | Status: DISCONTINUED | OUTPATIENT
Start: 2019-02-04 | End: 2019-02-13

## 2019-02-04 RX ORDER — DOCUSATE SODIUM 100 MG
100 CAPSULE ORAL
Qty: 0 | Refills: 0 | Status: DISCONTINUED | OUTPATIENT
Start: 2019-02-04 | End: 2019-02-13

## 2019-02-04 RX ORDER — AMLODIPINE BESYLATE 2.5 MG/1
5 TABLET ORAL DAILY
Qty: 0 | Refills: 0 | Status: DISCONTINUED | OUTPATIENT
Start: 2019-02-04 | End: 2019-02-13

## 2019-02-04 RX ORDER — MONTELUKAST 4 MG/1
10 TABLET, CHEWABLE ORAL DAILY
Qty: 0 | Refills: 0 | Status: DISCONTINUED | OUTPATIENT
Start: 2019-02-04 | End: 2019-02-13

## 2019-02-04 RX ORDER — AZITHROMYCIN 500 MG/1
500 TABLET, FILM COATED ORAL EVERY 24 HOURS
Qty: 0 | Refills: 0 | Status: DISCONTINUED | OUTPATIENT
Start: 2019-02-04 | End: 2019-02-04

## 2019-02-04 RX ORDER — INSULIN LISPRO 100/ML
VIAL (ML) SUBCUTANEOUS AT BEDTIME
Qty: 0 | Refills: 0 | Status: DISCONTINUED | OUTPATIENT
Start: 2019-02-04 | End: 2019-02-13

## 2019-02-04 RX ORDER — PREGABALIN 225 MG/1
1000 CAPSULE ORAL DAILY
Qty: 0 | Refills: 0 | Status: DISCONTINUED | OUTPATIENT
Start: 2019-02-04 | End: 2019-02-13

## 2019-02-04 RX ORDER — SODIUM CHLORIDE 9 MG/ML
1000 INJECTION, SOLUTION INTRAVENOUS
Qty: 0 | Refills: 0 | Status: DISCONTINUED | OUTPATIENT
Start: 2019-02-04 | End: 2019-02-13

## 2019-02-04 RX ORDER — SALIVA SUBSTITUTE COMB NO.11 351 MG
5 POWDER IN PACKET (EA) MUCOUS MEMBRANE EVERY 4 HOURS
Qty: 0 | Refills: 0 | Status: DISCONTINUED | OUTPATIENT
Start: 2019-02-04 | End: 2019-02-13

## 2019-02-04 RX ORDER — INSULIN GLARGINE 100 [IU]/ML
0.35 INJECTION, SOLUTION SUBCUTANEOUS
Qty: 0 | Refills: 0 | COMMUNITY

## 2019-02-04 RX ORDER — SODIUM CHLORIDE 9 MG/ML
1000 INJECTION INTRAMUSCULAR; INTRAVENOUS; SUBCUTANEOUS
Qty: 0 | Refills: 0 | Status: DISCONTINUED | OUTPATIENT
Start: 2019-02-04 | End: 2019-02-12

## 2019-02-04 RX ORDER — INSULIN GLARGINE 100 [IU]/ML
15 INJECTION, SOLUTION SUBCUTANEOUS ONCE
Qty: 0 | Refills: 0 | Status: COMPLETED | OUTPATIENT
Start: 2019-02-04 | End: 2019-02-04

## 2019-02-04 RX ORDER — INSULIN LISPRO 100/ML
VIAL (ML) SUBCUTANEOUS
Qty: 0 | Refills: 0 | Status: DISCONTINUED | OUTPATIENT
Start: 2019-02-04 | End: 2019-02-13

## 2019-02-04 RX ORDER — DEXTROSE 50 % IN WATER 50 %
15 SYRINGE (ML) INTRAVENOUS ONCE
Qty: 0 | Refills: 0 | Status: DISCONTINUED | OUTPATIENT
Start: 2019-02-04 | End: 2019-02-13

## 2019-02-04 RX ORDER — INSULIN GLARGINE 100 [IU]/ML
20 INJECTION, SOLUTION SUBCUTANEOUS AT BEDTIME
Qty: 0 | Refills: 0 | Status: DISCONTINUED | OUTPATIENT
Start: 2019-02-04 | End: 2019-02-13

## 2019-02-04 RX ORDER — LOSARTAN POTASSIUM 100 MG/1
50 TABLET, FILM COATED ORAL DAILY
Qty: 0 | Refills: 0 | Status: DISCONTINUED | OUTPATIENT
Start: 2019-02-04 | End: 2019-02-13

## 2019-02-04 RX ORDER — INSULIN GLARGINE 100 [IU]/ML
0 INJECTION, SOLUTION SUBCUTANEOUS
Qty: 0 | Refills: 0 | COMMUNITY

## 2019-02-04 RX ORDER — SENNA PLUS 8.6 MG/1
1 TABLET ORAL AT BEDTIME
Qty: 0 | Refills: 0 | Status: DISCONTINUED | OUTPATIENT
Start: 2019-02-04 | End: 2019-02-13

## 2019-02-04 RX ORDER — CEFTRIAXONE 500 MG/1
1 INJECTION, POWDER, FOR SOLUTION INTRAMUSCULAR; INTRAVENOUS EVERY 24 HOURS
Qty: 0 | Refills: 0 | Status: DISCONTINUED | OUTPATIENT
Start: 2019-02-04 | End: 2019-02-04

## 2019-02-04 RX ORDER — ACETAMINOPHEN 500 MG
650 TABLET ORAL EVERY 6 HOURS
Qty: 0 | Refills: 0 | Status: DISCONTINUED | OUTPATIENT
Start: 2019-02-04 | End: 2019-02-13

## 2019-02-04 RX ORDER — SODIUM CHLORIDE 0.65 %
1 AEROSOL, SPRAY (ML) NASAL
Qty: 0 | Refills: 0 | Status: DISCONTINUED | OUTPATIENT
Start: 2019-02-04 | End: 2019-02-13

## 2019-02-04 RX ORDER — FLUTICASONE PROPIONATE 50 MCG
1 SPRAY, SUSPENSION NASAL
Qty: 0 | Refills: 0 | Status: DISCONTINUED | OUTPATIENT
Start: 2019-02-04 | End: 2019-02-13

## 2019-02-04 RX ORDER — GLUCAGON INJECTION, SOLUTION 0.5 MG/.1ML
1 INJECTION, SOLUTION SUBCUTANEOUS ONCE
Qty: 0 | Refills: 0 | Status: DISCONTINUED | OUTPATIENT
Start: 2019-02-04 | End: 2019-02-13

## 2019-02-04 RX ADMIN — Medication 1 MILLIGRAM(S): at 13:56

## 2019-02-04 RX ADMIN — Medication 100 MILLIGRAM(S): at 17:30

## 2019-02-04 RX ADMIN — Medication 4: at 12:25

## 2019-02-04 RX ADMIN — Medication 60 MILLIGRAM(S): at 13:56

## 2019-02-04 RX ADMIN — SODIUM CHLORIDE 75 MILLILITER(S): 9 INJECTION INTRAMUSCULAR; INTRAVENOUS; SUBCUTANEOUS at 01:53

## 2019-02-04 RX ADMIN — Medication 1 TABLET(S): at 05:54

## 2019-02-04 RX ADMIN — INSULIN GLARGINE 20 UNIT(S): 100 INJECTION, SOLUTION SUBCUTANEOUS at 21:29

## 2019-02-04 RX ADMIN — Medication 1 SPRAY(S): at 18:30

## 2019-02-04 RX ADMIN — Medication 3 MILLILITER(S): at 05:53

## 2019-02-04 RX ADMIN — Medication 4: at 17:30

## 2019-02-04 RX ADMIN — INSULIN GLARGINE 15 UNIT(S): 100 INJECTION, SOLUTION SUBCUTANEOUS at 04:05

## 2019-02-04 RX ADMIN — Medication 1 TABLET(S): at 17:30

## 2019-02-04 RX ADMIN — Medication 3 MILLILITER(S): at 15:34

## 2019-02-04 RX ADMIN — LOSARTAN POTASSIUM 50 MILLIGRAM(S): 100 TABLET, FILM COATED ORAL at 05:54

## 2019-02-04 RX ADMIN — Medication 5 MILLIGRAM(S): at 01:56

## 2019-02-04 RX ADMIN — MONTELUKAST 10 MILLIGRAM(S): 4 TABLET, CHEWABLE ORAL at 13:56

## 2019-02-04 RX ADMIN — Medication 3 MILLILITER(S): at 10:34

## 2019-02-04 RX ADMIN — SENNA PLUS 1 TABLET(S): 8.6 TABLET ORAL at 21:29

## 2019-02-04 RX ADMIN — Medication 1 TABLET(S): at 17:29

## 2019-02-04 RX ADMIN — ENOXAPARIN SODIUM 40 MILLIGRAM(S): 100 INJECTION SUBCUTANEOUS at 05:57

## 2019-02-04 RX ADMIN — Medication 6: at 08:41

## 2019-02-04 RX ADMIN — Medication 60 MILLIGRAM(S): at 21:29

## 2019-02-04 RX ADMIN — Medication 3 MILLILITER(S): at 22:23

## 2019-02-04 RX ADMIN — SODIUM CHLORIDE 4 MILLILITER(S): 9 INJECTION INTRAMUSCULAR; INTRAVENOUS; SUBCUTANEOUS at 10:34

## 2019-02-04 RX ADMIN — Medication 1 TABLET(S): at 13:56

## 2019-02-04 RX ADMIN — PREGABALIN 1000 MICROGRAM(S): 225 CAPSULE ORAL at 18:31

## 2019-02-04 RX ADMIN — Medication 60 MILLIGRAM(S): at 06:08

## 2019-02-04 NOTE — CONSULT NOTE ADULT - ATTENDING COMMENTS
63 year old with poorly controlled DM, COPD, and bronchiectasis presents with acute on chronic shortness of breath with cough    1) Bronchiectasis/ COPD exacerbation: poor control in past. States she has been taking her treatments since her last pulmonary visit.    CXR without new consolidation. Afebrile with normal WBC.    In past- colonized with psuedomonas and S. maltophilia-  Can give a 5 day course of levaquin    2) AFB- one positive AFB cx in past.  Negative for Mtb and BRENTON.    Repeat AFB cultures were smear engative and without growth    3) Pseudomonas culture: sensitive to fluroquinone  Change azithromycin/ ceftriaxone to levaquin

## 2019-02-04 NOTE — H&P ADULT - HISTORY OF PRESENT ILLNESS
63 year old female, with past history significant for HTN, Type-II DM, Bronchiectasis, Allergic bronchopulmonary Aspergillosis and Chronic cough, presented to the ED secondary to worsening shortness of breath.  Seen and evaluated at bedside;    Vital signs upon ED presentation as follows: BP = 129/73, HR = 95, RR = 16, T = 37 C (98.6 F), O2 Sat = 100% on RA.  Diagnosed with Bronchiectasis, Dyspnea on Exertion, Cough and Shortness of Breath.  Prescribed azithromycin, ceftriaxone, NaCl 2 liters, Tylenol 975 mg , Albuterol, Duo-neb, Hycodan and Solu-medrol in the ED. 63 year old female, with past history significant for HTN, Type-II DM, Bronchiectasis (cystic), Allergic bronchopulmonary Aspergillosis, Stenotrophomonas colonization and Chronic cough, presented to the ED secondary to worsening shortness of breath.  Seen and evaluated at bedside; lying propped up in bed in NAD, but with increased shortness of breath on movement.  Patient relates breathing difficulties for the last ~ 15 days; has shortness of breath, dyspnea on minimal exertion, low grade fever of 99.9 F, cough productive of copious yellowish phlegm, chills, seating, feeling as if breathing cessation imminent at nights, usage of 3 pillows nightly.  Relates having to hold on to objects or her  to ambulate; at times, she has had to use a cane.  Suffers with nasal congestion, despite use of an inhaler.  Complains of headaches.    Per pulmonologist's note on [employer] (01/22/2019), patient has been non-compliant COPD or airway clearance regimen.  Sputum in the past grew Pseudomonas, which was sensitive to tobramycin; on tobramycin for 28 days on and 28 days off.  Has had negative AFBs in the past.  Underwent barium swallow, which has acceptable outcome.      Vital signs upon ED presentation as follows: BP = 129/73, HR = 95, RR = 16, T = 37 C (98.6 F), O2 Sat = 100% on RA.  Diagnosed with Bronchiectasis, Dyspnea on Exertion, Cough and Shortness of Breath.  Prescribed azithromycin, ceftriaxone, NaCl 2 liters, Tylenol 975 mg , Albuterol, Duo-neb, Hycodan and Solu-medrol in the ED.    [employer] charts reviewed, including last note of 01/22/2019. 63 year old female, with past history significant for HTN, Type-II DM, Bronchiectasis (cystic), Allergic bronchopulmonary Aspergillosis, Stenotrophomonas colonization and Chronic cough, presented to the ED secondary to worsening shortness of breath.  Seen and evaluated at bedside; lying propped up in bed in NAD, but with increased shortness of breath on movement.  Patient relates breathing difficulties for the last ~ 15 days; has shortness of breath, dyspnea on minimal exertion, low grade fever of 99.9 F, cough productive of copious yellowish phlegm, chills, seating, feeling as if breathing cessation imminent at nights, usage of 3 pillows nightly.  Relates having to hold on to objects or her  to ambulate; at times, she has had to use a cane.  Suffers with nasal congestion, despite use of an inhaler.  Complains of headaches.    Per pulmonologist's note on [employer] (01/22/2019), patient has been non-compliant COPD or airway clearance regimen.  Sputum in the past grew Pseudomonas, which was sensitive to tobramycin; on tobramycin for 28 days on and 28 days off.  Has had negative AFBs in the past.  Sweat chloride quantity for testing was insufficient.  CF mutations were negative in 12/2018.  Irvin binding lectin normal.  T-cells normal.  Mold profile negative.  Underwent barium swallow, which has acceptable outcome.      Vital signs upon ED presentation as follows: BP = 129/73, HR = 95, RR = 16, T = 37 C (98.6 F), O2 Sat = 100% on RA.  Diagnosed with Bronchiectasis, Dyspnea on Exertion, Cough and Shortness of Breath.  Prescribed azithromycin, ceftriaxone, NaCl 2 liters, Tylenol 975 mg , Albuterol, Duo-neb, Hycodan and Solu-medrol in the ED.    [employer] charts reviewed, including last note of 01/22/2019.

## 2019-02-04 NOTE — H&P ADULT - PROBLEM SELECTOR PLAN 1
- symptomatic with cough (productive or copious yellowish phlegm), shortness of breath, significant PEMBERTON, low grade temp (99.9F), chills, sweating, chest tightness w/ coughing, feeling as if about to stop breathing despite using 2 pillows nightly, for the past ~ 15 days, but did not present because of hope for improvement  - similar respiratory difficulty in 10/2018  - mouth breathing & complaining of significant mouth/throat dryness with difficulty swallowing food bolus without liquids  - difficulty ambulating without holding on to an individual or object and has to use cane occasionally at bedtime  - RVP neg  - s/p azithromycin, ceftriaxone, NaCl 2 liters, Tylenol 975 mg, albuterol x one, Duo-neb x one, Hycodan x one and Solu-medrol 125 mg x one in the ED - with some improvement in condition  - continuing with abx, Duo-neb, Solu-medrol, limited Hycodan dosing  - maintaining O2 Sat at 100%  - pulmonology consult in the AM (House)  - continuing on tobramycin for 28 days as PTA - [employer] (began ~ 01/25/2019)  - IVF hydration  - f/u cultures - symptomatic with cough (productive or copious yellowish phlegm), shortness of breath, significant PEMBERTON (with minimal activity level), low grade temp (99.9F), chills, sweating, chest tightness w/ coughing, feeling as if about to stop breathing despite using 2 pillows nightly, for the past ~ 15 days, but did not present because of hope for improvement  - similar respiratory difficulty in 10/2018  - mouth breathing & complaining of significant mouth/throat dryness with difficulty swallowing food bolus without liquids  - difficulty ambulating without holding on to an individual or object and has to use cane occasionally at bedtime  - family history of bronchiectasis (parents) and allergic rhinitis (children)  - RVP neg, CXR unchanged  - s/p azithromycin, ceftriaxone, NaCl 2 liters, Tylenol 975 mg, albuterol x one, Duo-neb x one, Hycodan x one and Solu-medrol 125 mg x one in the ED - with some improvement in condition  - continuing with abx, Duo-neb, Solu-medrol, limited Hycodan dosing  - continuing on tobramycin for 28 days as PTA - [employer] (began ~ 01/15/2019)  - maintaining acceptable O2 Sat (>95%), but will prescribe supplemental O2 via NC, as needed  - pulmonology consult in the AM (House)  - IVF hydration  - chest percussion for airway clearance  - 3% saline nebulization  - f/u cultures

## 2019-02-04 NOTE — CONSULT NOTE ADULT - SUBJECTIVE AND OBJECTIVE BOX
HPI:  63 year old female, with past history significant for HTN, Type-II DM, Bronchiectasis (cystic), Allergic bronchopulmonary Aspergillosis, Stenotrophomonas colonization and Chronic cough, presented to the ED secondary to worsening shortness of breath.     the patient states that she has had a cough of five months duration.  Over the last few weeks, she has felt worse.  the cough has been more intense and associated with increasing shortness of breath.  She notes an occasional low grade temperature to 100.    She is followed by Dr. Young of pulmonary.  She was last seen at the end of January.    Since that time, she has been compliant with her regimen.   Patient relates breathing difficulties for the last ~ 15 days; has shortness of breath, dyspnea on minimal exertion, low grade fever of 99.9 F, cough productive of copious yellowish phlegm, chills, seating, feeling as if breathing cessation imminent at nights, usage of 3 pillows nightly.  Relates having to hold on to objects or her  to ambulate; at times, she has had to use a cane.  Suffers with nasal congestion, despite use of an inhaler.  Complains of headaches.        PAST MEDICAL & SURGICAL HISTORY:  Recurrent pneumonia  Allergic rhinitis  Pseudomonas aeruginosa colonization  Postnasal drip  GERD (gastroesophageal reflux disease)  Microcytic anemia  Vitamin D deficiency  Hypertension  Bronchiectasis  ABPA (allergic bronchopulmonary aspergillosis)  DM (diabetes mellitus)  History of cholecystectomy      Allergies    No Known Allergies    Intolerances        ANTIMICROBIALS:  azithromycin  IVPB 500 every 24 hours  cefTRIAXone   IVPB 1 every 24 hours  tobramycin for Nebulization 300 every 12 hours  trimethoprim  160 mG/sulfamethoxazole 800 mG 1 every 12 hours      OTHER MEDS:  acetaminophen   Tablet .. 650 milliGRAM(s) Oral every 6 hours PRN  ALBUTerol/ipratropium for Nebulization 3 milliLiter(s) Nebulizer every 6 hours  amLODIPine   Tablet 5 milliGRAM(s) Oral daily  Biotene Dry Mouth Oral Rinse 5 milliLiter(s) Swish and Spit every 4 hours PRN  calcium carbonate 1250 mG  + Vitamin D (OsCal 500 + D) 1 Tablet(s) Oral two times a day  cyanocobalamin 1000 MICROGram(s) Oral daily  dextrose 40% Gel 15 Gram(s) Oral once PRN  dextrose 5%. 1000 milliLiter(s) IV Continuous <Continuous>  dextrose 50% Injectable 12.5 Gram(s) IV Push once  dextrose 50% Injectable 25 Gram(s) IV Push once  dextrose 50% Injectable 25 Gram(s) IV Push once  docusate sodium 100 milliGRAM(s) Oral two times a day  enoxaparin Injectable 40 milliGRAM(s) SubCutaneous every 24 hours  folic acid 1 milliGRAM(s) Oral daily  glucagon  Injectable 1 milliGRAM(s) IntraMuscular once PRN  guaiFENesin   Syrup  (Sugar-Free) 200 milliGRAM(s) Oral every 6 hours PRN  insulin glargine Injectable (LANTUS) 20 Unit(s) SubCutaneous at bedtime  insulin lispro (HumaLOG) corrective regimen sliding scale   SubCutaneous three times a day before meals  insulin lispro (HumaLOG) corrective regimen sliding scale   SubCutaneous at bedtime  losartan 50 milliGRAM(s) Oral daily  methylPREDNISolone sodium succinate Injectable 60 milliGRAM(s) IV Push every 8 hours  montelukast 10 milliGRAM(s) Oral daily  multivitamin 1 Tablet(s) Oral daily  senna 1 Tablet(s) Oral at bedtime  sodium chloride 0.65% Nasal 1 Spray(s) Both Nostrils two times a day PRN  sodium chloride 0.9%. 1000 milliLiter(s) IV Continuous <Continuous>  sodium chloride 3%  Inhalation 4 milliLiter(s) Inhalation two times a day      SOCIAL HISTORY:  From [facility] for 4 years  No tobacco  not working  no alcohol    FAMILY HISTORY:  Family history of bronchitis (Father, Mother): parents  Family history of allergic rhinitis (Child, Child): son, daughter  Family history of diabetes mellitus (Father): father      REVIEW OF SYSTEMS  [  ] ROS unobtainable because:    [ x ] All other systems negative except as noted below:	    Constitutional:  [ ] fever [ ] chills  [ ] weight loss  [x ] weakness  Skin:  [ ] rash [ ] phlebitis	  Eyes: [ ] icterus [ ] pain  [ ] discharge	  ENMT: [ ] sore throat  [ ] thrush [ ] ulcers [ ] exudates  Respiratory: [x ] dyspnea [ ] hemoptysis [x ] cough [x ] sputum	  Cardiovascular:  [ ] chest pain [ ] palpitations [ ] edema	  Gastrointestinal:  [ ] nausea [ ] vomiting [ ] diarrhea [ ] constipation [ ] pain	  Genitourinary:  [ ] dysuria [ ] frequency [ ] hematuria [ ] discharge [ ] flank pain  [ ] incontinence  Musculoskeletal:  [ ] myalgias [ ] arthralgias [ ] arthritis  [ ] back pain  Neurological:  [ ] headache [ ] seizures  [ ] confusion/altered mental status  Psychiatric:  [ ] anxiety [ ] depression	  Hematology/Lymphatics:  [ ] lymphadenopathy  Endocrine:  [ ] adrenal [ ] thyroid  Allergic/Immunologic:	 [ ] transplant [ ] seasonal    PHYSICAL EXAM:  General: [x ] non-toxic  HEAD/EYES: [ ] PERRL [x ] white sclera [ ] icterus  ENT:  [ ] normal [x ] supple [ ] thrush [ ] pharyngeal exudate  Cardiovascular:   [ ] murmur [x ] normal [ ] PPM/AICD  Respiratory:  [x ] clear to ausculation bilaterally  GI:  [x ] soft, non-tender, normal bowel sounds  :  [ ] francisco [x ] no CVA tenderness   Musculoskeletal:  [x ] no synovitis  Neurologic:  x[ ] non-focal exam   Skin:  [x ] no rash  Lymph: [ ] no lymphadenopathy  Psychiatric:  [ ] appropriate affect [x ] alert & oriented  Lines:  [x ] no phlebitis [ ] central line          Drug Dosing Weight  Height (cm): 162.56 (2019 07:15)  Weight (kg): 45.8 (2019 07:15)  BMI (kg/m2): 17.3 (2019 07:15)  BSA (m2): 1.46 (2019 07:15)    Vital Signs Last 24 Hrs  T(F): 98.5 (19 @ 14:50), Max: 98.6 (19 @ 17:40)    Vital Signs Last 24 Hrs  HR: 88 (19 @ 14:50) (76 - 97)  BP: 137/77 (19 @ 14:50) (118/69 - 147/69)  RR: 17 (19 @ 14:50)  SpO2: 95% (19 @ 14:50) (95% - 100%)  Wt(kg): --                          9.8    6.90  )-----------( 250      ( 2019 07:15 )             32.3           135  |  101  |  20  ----------------------------<  307<H>  4.5   |  23  |  0.62    Ca    9.1      2019 07:15  Phos  3.6     -  Mg     2.1     -    TPro  6.9  /  Alb  3.5  /  TBili  < 0.2<L>  /  DBili  x   /  AST  35<H>  /  ALT  40<H>  /  AlkPhos  166<H>  -      Urinalysis Basic - ( 2019 21:57 )    Color: LIGHT YELLOW / Appearance: CLEAR / S.021 / pH: 6.5  Gluc: >1000 / Ketone: NEGATIVE  / Bili: NEGATIVE / Urobili: NORMAL   Blood: NEGATIVE / Protein: NEGATIVE / Nitrite: NEGATIVE   Leuk Esterase: MODERATE / RBC: 0-2 / WBC 11-25   Sq Epi: OCC / Non Sq Epi: x / Bacteria: FEW        MICROBIOLOGY:  Rapid Respiratory Viral Panel (19 @ 19:33)    Adenovirus (RapRVP): Not Detected    Influenza A (RapRVP): Not Detected    Influenza AH1 2009 (RapRVP): Not Detected    Influenza AH1 (RapRVP): Not Detected    Influenza AH3 (RapRVP): Not Detected    Influenza B (RapRVP): Not Detected    Parainfluenza 1 (RapRVP): Not Detected    Parainfluenza 2 (RapRVP): Not Detected    Parainfluenza 3 (RapRVP): Not Detected    Parainfluenza 4 (RapRVP): Not Detected    Resp Syncytial Virus (RapRVP): Not Detected    Chlamydia pneumoniae (RapRVP): Not Detected    Mycoplasma pneumoniae (RapRVP): Not Detected    Entero/Rhinovirus (RapRVP): Not Detected    hMPV (RapRVP): Not Detected    Coronavirus (229E,HKU1,NL63,OC43): Not Detected This Respiratory Panel uses polymerase chain reaction (PCR)  to detect for adenovirus; coronavirus (HKU1, NL63, 229E,  OC43); human metapneumovirus (hMPV); human  enterovirus/rhinovirus (Entero/RV); influenza A; influenza  A/H1: influenza A/H3; influenza A/H1-2009; influenza B;  parainfluenza viruses 1,2,3,4; respiratory syncytial virus;  Mycoplasma pneumoniae; and Chlamydophila pneumoniae.      RADIOLOGY:  < from: Xray Chest 1 View AP/PA (19 @ 19:15) >  IMPRESSION:   No new focal consolidations.    < end of copied text >    < from: CT Chest No Cont (18 @ 13:18) >  IMPRESSION:     Bilateral mild areas of bronchiectasis or bronchial wall thickening are   stable since 2017 and correspond to the patient's given   history of allergic bronchopulmonary aspergillosis. Bilateral   superimposed tree-in-bud opacities or small nodules related to foci of   mucoid impacted distal dilated airways are unchanged.     Previously described right upper lobe peripheral cyst demonstrates   interval decrease in size.    < end of copied text > HPI:  63 year old female, with past history significant for HTN, Type-II DM, Bronchiectasis (cystic), Allergic bronchopulmonary Aspergillosis, Stenotrophomonas colonization and Chronic cough, presented to the ED secondary to worsening shortness of breath.     the patient states that she has had a cough of five months duration.  Over the last few weeks, she has felt worse.  the cough has been more intense and associated with increasing shortness of breath.  She notes an occasional low grade temperature to 100.    She is followed by Dr. Young of pulmonary.  She was last seen at the end of January.    Since that time, she has been compliant with her regimen.   Patient relates breathing difficulties for the last ~ 15 days; has shortness of breath, dyspnea on minimal exertion, low grade fever of 99.9 F, cough productive of copious yellowish phlegm, chills, seating, feeling as if breathing cessation imminent at nights, usage of 3 pillows nightly.  Relates having to hold on to objects or her  to ambulate; at times, she has had to use a cane.  Suffers with nasal congestion, despite use of an inhaler.  Complains of headaches.    Above reviewed. Patient reported her "lung problems" started since  but cough has lately worsened since the last 5 months and more so during the last month. She reports compliance with medications and inhalers but reports nothing has helped her. Reports low grade fever 100 at home.   Lived in [place] until 4 years ago, then came to [place].     PAST MEDICAL & SURGICAL HISTORY:  Recurrent pneumonia  Allergic rhinitis  Pseudomonas aeruginosa colonization  Postnasal drip  GERD (gastroesophageal reflux disease)  Microcytic anemia  Vitamin D deficiency  Hypertension  Bronchiectasis  ABPA (allergic bronchopulmonary aspergillosis)  DM (diabetes mellitus)  History of cholecystectomy    Allergies    No Known Allergies    Intolerances    ANTIMICROBIALS:  azithromycin  IVPB 500 every 24 hours  cefTRIAXone   IVPB 1 every 24 hours  tobramycin for Nebulization 300 every 12 hours  trimethoprim  160 mG/sulfamethoxazole 800 mG 1 every 12 hours    OTHER MEDS:  acetaminophen   Tablet .. 650 milliGRAM(s) Oral every 6 hours PRN  ALBUTerol/ipratropium for Nebulization 3 milliLiter(s) Nebulizer every 6 hours  amLODIPine   Tablet 5 milliGRAM(s) Oral daily  Biotene Dry Mouth Oral Rinse 5 milliLiter(s) Swish and Spit every 4 hours PRN  calcium carbonate 1250 mG  + Vitamin D (OsCal 500 + D) 1 Tablet(s) Oral two times a day  cyanocobalamin 1000 MICROGram(s) Oral daily  dextrose 40% Gel 15 Gram(s) Oral once PRN  dextrose 5%. 1000 milliLiter(s) IV Continuous <Continuous>  dextrose 50% Injectable 12.5 Gram(s) IV Push once  dextrose 50% Injectable 25 Gram(s) IV Push once  dextrose 50% Injectable 25 Gram(s) IV Push once  docusate sodium 100 milliGRAM(s) Oral two times a day  enoxaparin Injectable 40 milliGRAM(s) SubCutaneous every 24 hours  folic acid 1 milliGRAM(s) Oral daily  glucagon  Injectable 1 milliGRAM(s) IntraMuscular once PRN  guaiFENesin   Syrup  (Sugar-Free) 200 milliGRAM(s) Oral every 6 hours PRN  insulin glargine Injectable (LANTUS) 20 Unit(s) SubCutaneous at bedtime  insulin lispro (HumaLOG) corrective regimen sliding scale   SubCutaneous three times a day before meals  insulin lispro (HumaLOG) corrective regimen sliding scale   SubCutaneous at bedtime  losartan 50 milliGRAM(s) Oral daily  methylPREDNISolone sodium succinate Injectable 60 milliGRAM(s) IV Push every 8 hours  montelukast 10 milliGRAM(s) Oral daily  multivitamin 1 Tablet(s) Oral daily  senna 1 Tablet(s) Oral at bedtime  sodium chloride 0.65% Nasal 1 Spray(s) Both Nostrils two times a day PRN  sodium chloride 0.9%. 1000 milliLiter(s) IV Continuous <Continuous>  sodium chloride 3%  Inhalation 4 milliLiter(s) Inhalation two times a day    SOCIAL HISTORY:  From [facility] for 4 years  No tobacco  not working  no alcohol    FAMILY HISTORY:  Family history of bronchitis (Father, Mother): parents  mother, , age 70  father, , age 75   Family history of allergic rhinitis (Child, Child): son, daughter  Family history of diabetes mellitus (Father): father  No h/o lung problems in family     REVIEW OF SYSTEMS  [  ] ROS unobtainable because:    [ x ] All other systems negative except as noted below:	    Constitutional:  [ ] fever [ ] chills  [ ] weight loss  [x ] weakness  Skin:  [ ] rash [ ] phlebitis	  Eyes: [ ] icterus [ ] pain  [ ] discharge	  ENMT: [ ] sore throat  [ ] thrush [ ] ulcers [ ] exudates  Respiratory: [x ] dyspnea [ ] hemoptysis [x ] cough [x ] sputum	  Cardiovascular:  [ ] chest pain [ ] palpitations [ ] edema	  Gastrointestinal:  [ ] nausea [ ] vomiting [ ] diarrhea [ ] constipation [ ] pain	  Genitourinary:  [ ] dysuria [ ] frequency [ ] hematuria [ ] discharge [ ] flank pain  [ ] incontinence  Musculoskeletal:  [ ] myalgias [ ] arthralgias [ ] arthritis  [ ] back pain  Neurological:  [ ] headache [ ] seizures  [ ] confusion/altered mental status  Psychiatric:  [ ] anxiety [ ] depression	  Hematology/Lymphatics:  [ ] lymphadenopathy  Endocrine:  [ ] adrenal [ ] thyroid  Allergic/Immunologic:	 [ ] transplant [ ] seasonal    PHYSICAL EXAM:  General: [x ] non-toxic  HEAD/EYES: [ ] PERRL [x ] white sclera [ ] icterus  ENT:  [ ] normal [x ] supple [ ] thrush [ ] pharyngeal exudate  Cardiovascular:   [ ] murmur [x ] normal [ ] PPM/AICD  Respiratory:  [x ] clear to ausculation bilaterally  GI:  [x ] soft, non-tender, normal bowel sounds  :  [ ] francisco [x ] no CVA tenderness   Musculoskeletal:  [x ] no synovitis  Neurologic:  x[ ] non-focal exam   Skin:  [x ] no rash  Lymph: [ ] no lymphadenopathy  Psychiatric:  [ ] appropriate affect [x ] alert & oriented  Lines:  [x ] no phlebitis [ ] central line      Drug Dosing Weight  Height (cm): 162.56 (2019 07:15)  Weight (kg): 45.8 (2019 07:15)  BMI (kg/m2): 17.3 (2019 07:15)  BSA (m2): 1.46 (2019 07:15)    Vital Signs Last 24 Hrs  T(F): 98.5 (19 @ 14:50), Max: 98.6 (19 @ 17:40)    Vital Signs Last 24 Hrs  HR: 88 (19 @ 14:50) (76 - 97)  BP: 137/77 (19 @ 14:50) (118/69 - 147/69)  RR: 17 (19 @ 14:50)  SpO2: 95% (19 @ 14:50) (95% - 100%)  Wt(kg): --                          9.8    6.90  )-----------( 250      ( 2019 07:15 )             32.3       02-04    135  |  101  |  20  ----------------------------<  307<H>  4.5   |  23  |  0.62    Ca    9.1      2019 07:15  Phos  3.6     02-  Mg     2.1     -04    TPro  6.9  /  Alb  3.5  /  TBili  < 0.2<L>  /  DBili  x   /  AST  35<H>  /  ALT  40<H>  /  AlkPhos  166<H>  -      Urinalysis Basic - ( 2019 21:57 )    Color: LIGHT YELLOW / Appearance: CLEAR / S.021 / pH: 6.5  Gluc: >1000 / Ketone: NEGATIVE  / Bili: NEGATIVE / Urobili: NORMAL   Blood: NEGATIVE / Protein: NEGATIVE / Nitrite: NEGATIVE   Leuk Esterase: MODERATE / RBC: 0-2 / WBC 11-25   Sq Epi: OCC / Non Sq Epi: x / Bacteria: FEW        MICROBIOLOGY:  Rapid Respiratory Viral Panel (19 @ 19:33)    Adenovirus (RapRVP): Not Detected    Influenza A (RapRVP): Not Detected    Influenza AH1 2009 (RapRVP): Not Detected    Influenza AH1 (RapRVP): Not Detected    Influenza AH3 (RapRVP): Not Detected    Influenza B (RapRVP): Not Detected    Parainfluenza 1 (RapRVP): Not Detected    Parainfluenza 2 (RapRVP): Not Detected    Parainfluenza 3 (RapRVP): Not Detected    Parainfluenza 4 (RapRVP): Not Detected    Resp Syncytial Virus (RapRVP): Not Detected    Chlamydia pneumoniae (RapRVP): Not Detected    Mycoplasma pneumoniae (RapRVP): Not Detected    Entero/Rhinovirus (RapRVP): Not Detected    hMPV (RapRVP): Not Detected    Coronavirus (229E,HKU1,NL63,OC43): Not Detected This Respiratory Panel uses polymerase chain reaction (PCR)  to detect for adenovirus; coronavirus (HKU1, NL63, 229E,  OC43); human metapneumovirus (hMPV); human  enterovirus/rhinovirus (Entero/RV); influenza A; influenza  A/H1: influenza A/H3; influenza A/H1-2009; influenza B;  parainfluenza viruses 1,2,3,4; respiratory syncytial virus;  Mycoplasma pneumoniae; and Chlamydophila pneumoniae.      RADIOLOGY:  < from: Xray Chest 1 View AP/PA (19 @ 19:15) >  IMPRESSION:   No new focal consolidations.    < end of copied text >    < from: CT Chest No Cont (18 @ 13:18) >  IMPRESSION:     Bilateral mild areas of bronchiectasis or bronchial wall thickening are   stable since 2017 and correspond to the patient's given   history of allergic bronchopulmonary aspergillosis. Bilateral   superimposed tree-in-bud opacities or small nodules related to foci of   mucoid impacted distal dilated airways are unchanged.     Previously described right upper lobe peripheral cyst demonstrates   interval decrease in size.

## 2019-02-04 NOTE — PATIENT PROFILE ADULT - IS PATIENT POST-MENOPAUSAL?
Patient:   Soraya Nair  MR#:    [id number]   Room:    12/812-01   YOB: 1965  Date of Progress Note: 1/29/2018  Time of Note                           7:37 AM  Consulting Physician:   Kvng De Anda M.D. Attending Physician:  Kvng De Anda MD     Chief complaint stroke/abnormal brain scan    S:This 50 y.o. female  who presented to [facility] ER on 1/1/8 with c/o left sided weakness & facial drooping,right-sided numbness,dizziness, dysarthria that had started about 1 week prior. CT of head done in ER revealed right frontal lobe hypodensities are most consistent with  nonhemorrhagic subacute ischemia of the right middle cerebral artery distribution. She was admitted for further treatment with consult for neurology. MRI of brain done revealed seeral areas of small acute inarction in the right hemisphere,as well as a 17x42 mm enhancing lesion on the right basal ganglia. She was seen by Neurologist Dr. Dilma Roque who felt the infarcts were consistent with embolic process. Cardiology was consulted for evaluation WAQAR and loop recorder. She was seen by Dr. Vallarie Landau and taken for WAQAR and loop recorder placement on 1/4/18. She tolerated the procedure well. Neurosurgery was consulted to evaluate lesion. She was seen by Dr. Mose Crigler. Dr. Michail Skiff believed that the findings of the MRI are consistent with an ischemic event followed by a small amount of hemorrhage that is resulting in cerebral edema, however, the possibility of an underlying mass is not entirely excluded. He will follow her very closely to watch the dynamics of the mass. He felt that a biopsy of a deep cerebral lesion that could possibly be a stroke poses more risk than benefit at this time and recommended repeat MRI brain in 4-6 weeks with an outpatient follow up. She continues to have left sided weakness,dysarthria and dysphasia. She was evaluated by SPT therapy and is currently on mechanical soft diet with nectar thick liquids.  She is participating in PT/OT as well. She currently lives with her  at [facility]. She is felt to need a stay on Rehab for continue PT/OT/SPT before for she will be ready to return ther with her . She is now felt ready to start the Rehab program. No new issues.         REVIEW OF SYSTEMS:  Constitutional: No fevers No chills  Neck:No stiffness  Respiratory: No shortness of breath  Cardiovascular: No chest pain No palpitations  Gastrointestinal: No abdominal pain    Genitourinary: No Dysuria  Neurological: No headache, no confusion    Past Medical History:      Diagnosis Date    Anxiety     Cryptogenic stroke ([facility] [address]     Depression     Hypertension     Status post placement of implantable loop recorder 2018       Past Surgical History:      Procedure Laterality Date     SECTION      x2    CHOLECYSTECTOMY      COSMETIC SURGERY      HAND SURGERY         Medications in Hospital:      Current Facility-Administered Medications:     naproxen (NAPROSYN) tablet 500 mg, 500 mg, Oral, BID PRN, Burnell Apgar, MD    aluminum & magnesium hydroxide-simethicone (MAALOX) 200-200-20 MG/5ML suspension 30 mL, 30 mL, Oral, Q6H PRN, Burnell Apgar, MD, 30 mL at 18 1458    acetaminophen (TYLENOL) tablet 650 mg, 650 mg, Oral, Q4H PRN, Nikita Mancia, DO, 650 mg at 18 1816    magnesium hydroxide (MILK OF MAGNESIA) 400 MG/5ML suspension 30 mL, 30 mL, Oral, Daily PRN, Nikita Chavezo, DO, 30 mL at 18 2058    aspirin EC tablet 81 mg, 81 mg, Oral, Daily, Nikita Mancia, DO, 81 mg at 18 0851    FLUoxetine (PROZAC) capsule 20 mg, 20 mg, Oral, Daily, Nikita Mancia, DO, 20 mg at 18 0850    fluticasone (FLONASE) 50 MCG/ACT nasal spray 1 spray, 1 spray, Nasal, Daily, Nikita Mancia DO, 1 spray at 18 0851    gabapentin (NEURONTIN) capsule 300 mg, 300 mg, Oral, TID, Nikita Mancia DO, 300 mg at 18 2121    hydrOXYzine (VISTARIL) capsule 25 mg, 25 mg, Oral, TID PRN, Ivonne Mancia DO    OLANZapine THE PAVILIION) tablet 5 mg, 5 mg, Oral, Nightly, Crystal Ch DO, 5 mg at 01/28/18 2121    pravastatin (PRAVACHOL) tablet 40 mg, 40 mg, Oral, Nightly, Ivonne Mancia, DO, 40 mg at 01/28/18 2121    docusate sodium (COLACE) capsule 100 mg, 100 mg, Oral, BID PRN, Kassidy Riggs MD, 100 mg at 01/08/18 2058    bisacodyl (DULCOLAX) suppository 10 mg, 10 mg, Rectal, Daily PRN, Kassidy Riggs MD    enoxaparin (LOVENOX) injection 40 mg, 40 mg, Subcutaneous, Nightly, Kassidy Riggs MD, 40 mg at 01/28/18 2121    Allergies:  Penicillins    Social History:   TOBACCO:   reports that she has been smoking. She has been smoking about 0.50 packs per day. She has never used smokeless tobacco.  ETOH:   reports that she drinks alcohol. Family History:       Problem Relation Age of Onset    Mental Illness Mother     Diabetes Mother     Cancer Mother     Mental Illness Father          PHYSICAL EXAM:  /76   Pulse 86   Temp 98.1 °F (36.7 °C) (Temporal)   Resp 16   Ht 5' 2\" (1.575 m)   Wt 198 lb 0.2 oz (89.8 kg)   SpO2 97%   BMI 36.22 kg/m²     Constitutional: she appears well-developed and well-nourished. Eyes - conjunctiva normal.    Ear, nose, throat - No scars, masses, or lesions over external nose or ears, no atrophy of tongue  Neck-symmetric, no masses noted, no jugular vein distension  Respiration- chest wall appears symmetric, good expansion,   normal effort without use of accessory muscles  Musculoskeletal - no significant wasting of muscles noted, no bony deformities Extremities-no clubbing, cyanosis or edema  Skin - warm, dry, and intact. No rash, erythema, or pallor.   Psychiatric - mood, affect, and behavior appear normal.      Neurology  NEUROLOGICAL EXAM:      Mental status   Awake, alert, fluent oriented x 3 appropriate affect  Attention and concentration appear appropriate  Recent and remote memory appears unremarkable  Speech normal without dysarthria  No clear issues with language       Cranial Nerves   CN III, IV,VI-EOMI, No nystagmus, conjugate eye movements, no ptosis    CN VII-left facial droop           Motor function  Antigravity x 4 drift left     Sensory function      Cerebellar      Tremor None present     Gait                  Not tested           Nursing/pcp notes, imaging,labs and vitals reviewed. PT,OT and/or speech notes reviewed    Lab Results   Component Value Date    WBC 6.6 01/29/2018    HGB 12.6 01/29/2018    HCT 38.4 01/29/2018    MCV 99.7 (H) 01/29/2018     01/29/2018     Lab Results   Component Value Date     01/06/2018    K 3.8 01/06/2018     01/06/2018    CO2 28 01/06/2018    BUN 12 01/06/2018    CREATININE 0.7 01/06/2018    GLUCOSE 97 01/06/2018    CALCIUM 9.1 01/06/2018    PROT 6.7 01/06/2018    LABALBU 3.7 01/06/2018    BILITOT <0.2 01/06/2018    ALKPHOS 63 01/06/2018    AST 16 01/06/2018    ALT 19 01/06/2018    LABGLOM >60 01/06/2018    GLOB 3.0 11/12/2016     Lab Results   Component Value Date    INR 1.03 01/01/2018    PROTIME 13.4 01/01/2018             RECORD REVIEW: Previous medical records, medications were reviewed at today's visit    IMPRESSION:   1. Stroke-on aspirin/statin-cardioembolic status post loop recorder/WAQAR  2. Abnormal brain scan- repeat MRI of the brain in 4-6 weeks  3. Mood disorder-on medications continue monitor  4. DVT prophylaxis-on Lovenox  5. Allergies Flonase  6. Hyperlipidemia-on statin  7.  PT/OT/speech    DC home        CALL WITH ANY QUESTIONS  [phone] CELL  Dr Lucinda Moritz yes

## 2019-02-04 NOTE — H&P ADULT - ASSESSMENT
63 year old female, with past history significant for HTN, Type-II DM, Bronchiectasis, Allergic bronchopulmonary Aspergillosis and Chronic cough, presented to the ED secondary to worsening shortness of breath.  Vital signs upon ED presentation as follows: BP = 129/73, HR = 95, RR = 16, T = 37 C (98.6 F), O2 Sat = 100% on RA.  Diagnosed with Bronchiectasis, Dyspnea on Exertion, Cough and Shortness of Breath.  Admitted for further management of: 63 year old female, with past history significant for HTN, Type-II DM, Bronchiectasis (cystic), Allergic bronchopulmonary Aspergillosis, Stenotrophomonas colonization and Chronic cough, presented to the ED secondary to worsening shortness of breath.  Vital signs upon ED presentation as follows: BP = 129/73, HR = 95, RR = 16, T = 37 C (98.6 F), O2 Sat = 100% on RA.  Diagnosed with Bronchiectasis, Dyspnea on Exertion, Cough and Shortness of Breath.  Admitted for further management of:

## 2019-02-04 NOTE — H&P ADULT - PROBLEM SELECTOR PLAN 3
- respiratory difficulties even with minimum exertion  - therapies as above  - supplemental oxygen as/if needed (not using presently)

## 2019-02-04 NOTE — H&P ADULT - NSHPLABSRESULTS_GEN_ALL_CORE
11.4   9.69  )-----------( 283      ( 2019 19:33 )             38.4       -    134<L>  |  98  |  23  ----------------------------<  323<H>  5.1   |  26  |  0.73    Ca    10.1      2019 19:33    TPro  8.2  /  Alb  4.1  /  TBili  0.2  /  DBili  x   /  AST  45<H>  /  ALT  41<H>  /  AlkPhos  196<H>                Urinalysis Basic - ( 2019 21:57 )    Color: LIGHT YELLOW / Appearance: CLEAR / S.021 / pH: 6.5  Gluc: >1000 / Ketone: NEGATIVE  / Bili: NEGATIVE / Urobili: NORMAL   Blood: NEGATIVE / Protein: NEGATIVE / Nitrite: NEGATIVE   Leuk Esterase: MODERATE / RBC: 0-2 / WBC 11-25   Sq Epi: OCC / Non Sq Epi: x / Bacteria: FEW    PT/INR - ( 2019 19:33 )   PT: 11.6 SEC;   INR: 1.04          PTT - ( 2019 19:33 )  PTT:23.3 SEC    Lactate Trend = 1.3      CAPILLARY BLOOD GLUCOSE      POCT Blood Glucose.: 366 mg/dL (2019 17:45) 11.4   9.69  )-----------( 283      ( 2019 19:33 )             38.4       -    134<L>  |  98  |  23  ----------------------------<  323<H>  5.1   |  26  |  0.73    Ca    10.1      2019 19:33    TPro  8.2  /  Alb  4.1  /  TBili  0.2  /  DBili  x   /  AST  45<H>  /  ALT  41<H>  /  AlkPhos  196<H>      Urinalysis Basic - ( 2019 21:57 )    Color: LIGHT YELLOW / Appearance: CLEAR / S.021 / pH: 6.5  Gluc: >1000 / Ketone: NEGATIVE  / Bili: NEGATIVE / Urobili: NORMAL   Blood: NEGATIVE / Protein: NEGATIVE / Nitrite: NEGATIVE   Leuk Esterase: MODERATE / RBC: 0-2 / WBC 11-25   Sq Epi: OCC / Non Sq Epi: x / Bacteria: FEW    PT/INR - ( 2019 19:33 )   PT: 11.6 SEC;   INR: 1.04          PTT - ( 2019 19:33 )  PTT:23.3 SEC    Lactate Trend = 1.3      CAPILLARY BLOOD GLUCOSE      POCT Blood Glucose.: 366 mg/dL (2019 17:45)

## 2019-02-04 NOTE — H&P ADULT - PROBLEM SELECTOR PLAN 5
- glucose = 366 on FS, (323 on CMP)  - last HgbA1c = 9.1 in 07/2018  - on Prednisone 20 mg PTA (held)  - on Lantus 20 units PTA; continuing  - Lantus 15 units x one tonight; has not eaten much, but got bolus steroid (Solu-medrol 125 mg)  - ISS per FS  - consistent carb diet - glucose = 366 on FS, (323 on CMP)  - last HgbA1c = 9.1 in 07/2018  - on Lantus 15 units PTA; continuing  - was on metformin 1000 mg BID (held)  - Lantus 15 units x one tonight; has not eaten much, but got bolus steroid (Solu-medrol 125 mg) and continuing on steroid for now  - ISS per FS  - consistent carb diet  - f/u HgbA1c level in the AM

## 2019-02-04 NOTE — H&P ADULT - PMH
ABPA (allergic bronchopulmonary aspergillosis)    Bronchiectasis    DM (diabetes mellitus)    Hypertension    Microcytic anemia    Vitamin D deficiency ABPA (allergic bronchopulmonary aspergillosis)    Allergic rhinitis    Bronchiectasis    DM (diabetes mellitus)    GERD (gastroesophageal reflux disease)    Hypertension    Microcytic anemia    Postnasal drip    Pseudomonas aeruginosa colonization    Recurrent pneumonia    Vitamin D deficiency

## 2019-02-04 NOTE — H&P ADULT - RESPIRATORY AND THORAX COMMENTS
increased shortness of breath w/ any exertion (uses cane outside the home), copious yellowish phlegm w/ coughing.

## 2019-02-04 NOTE — CONSULT NOTE ADULT - ASSESSMENT
63 year old female, with past history significant for HTN, Type-II DM, Bronchiectasis (cystic) with a Primary Ciliary Dyskinesia gene variant, ?Allergic bronchopulmonary Aspergillosis, Stenotrophomonas colonization and Chronic cough, presented to the ED secondary to worsening shortness of breath. Unclear if bronchiectasis exacerbation but would treat like one. Patient also had a previous AFB culture, which was positive, but not TB. Unclear, and will likely need repeat cultures and then consider treatment of NTM if positive.     Recommendations:  - Stop inhaled tobramycin while inpatient and start anti-pseudomonals. If using levaquin, would continue bactrim for stenotrophomonas as the bug was resistant to levaquin in the culture from July 18 (NOT SENSITIVE)  - Check sputum culture again.  - Repeat sputum AFB for NTM/BRENTON  - Start patient on airway clearance with chest percussion vest as well as flutter valve/aerobika.  - Patient unable to tolerate hypersal. Would use bronchodilators prior to percussion therapy.  - Flonase for post nasal drip.   - Ambulate as tolerated.   - Will follow    Jad Gaviria MD  Fellow (PGY 6),  Pulmonary & Critical Care Medicine.  Pager - [phone] ([facility])/ [phone] ([place])
3 year old female, with past history significant for HTN, Type-II DM, Bronchiectasis (cystic), Allergic bronchopulmonary Aspergillosis, Stenotrophomonas colonization and Chronic cough, presented to the ED secondary to worsening shortness of breath and cough since 1 mo. Also reports low grade fever 100 at home. Denied weight loss, appetite loss. Afebrile here, no leukocytosis, chest x ray stable (since 2018)  with chronic b/l upper lobe predominant opacities, no consolidations. Follows with pulmonary outpatient, reports compliance with medications/inhalers but has had no improvement. RVP negative.   Prior sputum cx reviewed.   Sputum cx 7/3/2018- pseudomonas and stenotrophomonas  Sputum cx 12/21/2018 - pseudomonas   AFB cx 12/21/2018 - smear negative and no growth in 1 week   Both the pseudomonas and Stenotrophomonas from these prior cx were S to Levofloxacin     Acute exacerbation of bronchiectasis     Suggest:  * d/c ceftriaxone and Azithromycin   * Start Levofloxacin 750 daily   * f/u sputum cx  * h/o BRENTON in a prior cx: send sputum cx for BRENTON   * f/u pulmonary recommendations

## 2019-02-04 NOTE — CONSULT NOTE ADULT - SUBJECTIVE AND OBJECTIVE BOX
HPI:  63 year old female, with past history significant for HTN, Type-II DM, Bronchiectasis (cystic), ?Allergic bronchopulmonary Aspergillosis, Stenotrophomonas colonization and Chronic cough, presented to the ED secondary to worsening shortness of breath. She is a patient well known to Dr. Young from our service, with whom the case was discussed earlier today.     The patient states that she has had a cough of five months duration.  Over the last few weeks, she has felt worse. Her cough was worse when she started using hypersal and also got some pain with it due to intense coughing (likely musculoskeletal) and stopped using the hypersal. Has cough, but is barely able to expectorate any sputum. She also complains of congested nasal passages/sinuses and sinuses. Also endorses low grade fevers at home. Has been using inhaled tobramcyin at home.    Lived in [place] until 4 years ago, then came to [place]. In [place], carried a diagnosis of ABPA. During her genetic testing at Dr. Young's office, she was positive for a variant of primary ciliary dyskinesia. Her test for the CF gene was indeterminate.     PAST MEDICAL & SURGICAL HISTORY:  Recurrent pneumonia  Allergic rhinitis  Pseudomonas aeruginosa colonization  Postnasal drip  GERD (gastroesophageal reflux disease)  Microcytic anemia  Vitamin D deficiency  Hypertension  Bronchiectasis  ABPA (allergic bronchopulmonary aspergillosis)  DM (diabetes mellitus)  History of cholecystectomy      PAST MEDICAL & SURGICAL HISTORY:  Recurrent pneumonia  Allergic rhinitis  Pseudomonas aeruginosa colonization  Postnasal drip  GERD (gastroesophageal reflux disease)  Microcytic anemia  Vitamin D deficiency  Hypertension  Bronchiectasis  ABPA (allergic bronchopulmonary aspergillosis)  DM (diabetes mellitus)  History of cholecystectomy      FAMILY HISTORY:  Family history of bronchitis (Father, Mother): parents  Family history of allergic rhinitis (Child, Child): son, daughter  Family history of diabetes mellitus (Father): father      SOCIAL HISTORY:  Smoking: Denies.   EtOH Use: Denies  Marital Status:   Exposures: Denies  Recent Travel: Denies    Allergies    No Known Allergies    Intolerances        HOME MEDICATIONS:  Home Medications:  acetaminophen 325 mg oral tablet: 2 tab(s) orally every 4 hours, As Needed - 3), Moderate Pain (4 - 6) (27 Oct 2018 01:58)  Bactrim  mg-160 mg oral tablet: 1 tab(s) orally 2 times a day (2019 06:11)  Basaglar KwikPen 100 units/mL subcutaneous solution: 15  subcutaneous once a day (in the morning) (2019 06:12)  Bethkis 75 mg/mL inhalation solution: 4 milliliter(s) inhaled 2 times a day ~ started 01/15/2019 (2019 06:27)  Breo Ellipta 100 mcg-25 mcg/inh inhalation powder: 1 puff(s) inhaled once a day (27 Oct 2018 01:58)  Calcium 600+D oral tablet: 1 tab(s) orally 2 times a day (27 Oct 2018 01:58)  Daily Atul oral tablet: 1 tab(s) orally once a day (23 Oct 2018 15:47)  docusate sodium:  (2019 06:09)  folic acid 1 mg oral tablet: 1 tab(s) orally once a day (27 Oct 2018 01:58)  Lantus 100 units/mL subcutaneous solution: 15  subcutaneous (2019 06:07)  losartan 50 mg oral tablet: 1 tab(s) orally once a day (27 Oct 2018 01:58)  Melatonin 1 mg oral tablet: 1 tab(s) orally once a day (at bedtime) (2019 06:09)  metFORMIN 1000 mg oral tablet, extended release: 1 tab(s) orally 2 times a day (2019 06:09)  Mucinex 600 mg oral tablet, extended release: 1 tab(s) orally every 12 hours, As Needed for cough   (27 Oct 2018 01:58)  nystatin topical:  (2019 06:09)  Perforomist 20 mcg/2 mL inhalation solution: 2 milliliter(s) inhaled 2 times a day (2019 06:10)  Senna:  (2019 06:10)  Singulair:  (2019 06:10)  Sodium Chloride, Inhalation 3% inhalation solution:  (2019 06:11)  Symbicort 160 mcg-4.5 mcg/inh inhalation aerosol: 2 puff(s) inhaled 2 times a day (27 Oct 2018 01:58)  Ventolin HFA 90 mcg/inh inhalation aerosol: 2 puff(s) inhaled 4 times a day (2019 06:11)  Vitamin B12 1000 mcg oral tablet: 1 tab(s) orally once a day (23 Oct 2018 15:47)      REVIEW OF SYSTEMS:  Constitutional: + fevers or chills. No weight loss. + fatigue or generalised malaise.  Eyes: No itching or discharge from the eyes  ENT: No ear pain. No ear discharge. ++ nasal congestion. + post nasal drip. No epistaxis. No throat pain. No sore throat. No difficulty swallowing.   CV: No chest pain. No palpitations. No lightheadedness or dizziness.   Resp: + dyspnea at rest. + dyspnea on exertion. No orthopnea. No wheezing. + cough. No stridor. + sputum production. No chest pain with respiration.  GI: No nausea. No vomiting. No diarrhea.  MSK: No joint pain or pain in any extremities  Integumentary: No skin lesions. No pedal edema.  Neurological: No gross motor weakness. No sensory changes.    [X ] All other systems negative    OBJECTIVE:  ICU Vital Signs Last 24 Hrs  T(C): 36.7 (2019 21:24), Max: 36.9 (2019 00:34)  T(F): 98.1 (2019 21:24), Max: 98.5 (2019 14:50)  HR: 87 (2019 21:24) (76 - 97)  BP: 139/81 (2019 21:24) (118/69 - 139/81)  RR: 17 (2019 21:24) (16 - 17)  SpO2: 95% (2019 21:24) (95% - 96%)         @ 07:01  -  -04 @ 21:34  --------------------------------------------------------  IN: 1580 mL / OUT: 500 mL / NET: 1080 mL      CAPILLARY BLOOD GLUCOSE      POCT Blood Glucose.: 245 mg/dL (2019 21:27)      PHYSICAL EXAM:  General: Awake, alert, oriented X 3.   HEENT: Atraumatic, normocephalic.                 Mallampatti Grade 2   Lymph Nodes: No palpable lymphadenopathy  Neck: No JVD. No carotid bruit.   Respiratory: Normal chest expansion                         B/l rhoncherous.   Cardiovascular: S1 S2 normal. No murmurs, rubs or gallops.   Abdomen: Soft, non-tender, non-distended. No organomegaly.  Extremities: Warm to touch. Peripheral pulse palpable. No pedal edema.   Skin: No rashes or skin lesions  Neurological: Motor and sensory examination equal and normal in all four extremities.  Psychiatry: Appropriate mood and affect.    HOSPITAL MEDICATIONS:  MEDICATIONS  (STANDING):  ALBUTerol/ipratropium for Nebulization 3 milliLiter(s) Nebulizer every 6 hours  amLODIPine   Tablet 5 milliGRAM(s) Oral daily  calcium carbonate 1250 mG  + Vitamin D (OsCal 500 + D) 1 Tablet(s) Oral two times a day  cyanocobalamin 1000 MICROGram(s) Oral daily  dextrose 5%. 1000 milliLiter(s) (50 mL/Hr) IV Continuous <Continuous>  dextrose 50% Injectable 12.5 Gram(s) IV Push once  dextrose 50% Injectable 25 Gram(s) IV Push once  dextrose 50% Injectable 25 Gram(s) IV Push once  docusate sodium 100 milliGRAM(s) Oral two times a day  enoxaparin Injectable 40 milliGRAM(s) SubCutaneous every 24 hours  fluticasone propionate 50 MICROgram(s)/spray Nasal Spray 1 Spray(s) Both Nostrils two times a day  folic acid 1 milliGRAM(s) Oral daily  insulin glargine Injectable (LANTUS) 20 Unit(s) SubCutaneous at bedtime  insulin lispro (HumaLOG) corrective regimen sliding scale   SubCutaneous three times a day before meals  insulin lispro (HumaLOG) corrective regimen sliding scale   SubCutaneous at bedtime  levoFLOXacin IVPB      losartan 50 milliGRAM(s) Oral daily  montelukast 10 milliGRAM(s) Oral daily  multivitamin 1 Tablet(s) Oral daily  senna 1 Tablet(s) Oral at bedtime  sodium chloride 0.9%. 1000 milliLiter(s) (75 mL/Hr) IV Continuous <Continuous>  trimethoprim  160 mG/sulfamethoxazole 800 mG 1 Tablet(s) Oral every 12 hours    MEDICATIONS  (PRN):  acetaminophen   Tablet .. 650 milliGRAM(s) Oral every 6 hours PRN Moderate Pain (4 - 6)  Biotene Dry Mouth Oral Rinse 5 milliLiter(s) Swish and Spit every 4 hours PRN Mouth Care/Dryness  dextrose 40% Gel 15 Gram(s) Oral once PRN Blood Glucose LESS THAN 70 milliGRAM(s)/deciliter  glucagon  Injectable 1 milliGRAM(s) IntraMuscular once PRN Glucose LESS THAN 70 milligrams/deciliter  guaiFENesin   Syrup  (Sugar-Free) 200 milliGRAM(s) Oral every 6 hours PRN Cough  sodium chloride 0.65% Nasal 1 Spray(s) Both Nostrils two times a day PRN Nasal Congestion      LABS:                        9.8    6.90  )-----------( 250      ( 2019 07:15 )             32.3     02-04    135  |  101  |  20  ----------------------------<  307<H>  4.5   |  23  |  0.62    Ca    9.1      2019 07:15  Phos  3.6     -  Mg     2.1     -    TPro  6.9  /  Alb  3.5  /  TBili  < 0.2<L>  /  DBili  x   /  AST  35<H>  /  ALT  40<H>  /  AlkPhos  166<H>  02-    PT/INR - ( 2019 19:33 )   PT: 11.6 SEC;   INR: 1.04          PTT - ( 2019 19:33 )  PTT:23.3 SEC  Urinalysis Basic - ( 2019 21:57 )    Color: LIGHT YELLOW / Appearance: CLEAR / S.021 / pH: 6.5  Gluc: >1000 / Ketone: NEGATIVE  / Bili: NEGATIVE / Urobili: NORMAL   Blood: NEGATIVE / Protein: NEGATIVE / Nitrite: NEGATIVE   Leuk Esterase: MODERATE / RBC: 0-2 / WBC 11-25   Sq Epi: OCC / Non Sq Epi: x / Bacteria: FEW        Venous Blood Gas:  - @ 19:33  7.38/52/32/27/50.1  VBG Lactate: 1.3      MICROBIOLOGY:     Prior sputum cx reviewed.   Sputum cx 7/3/2018- pseudomonas and stenotrophomonas  Sputum cx 2018 - pseudomonas   AFB cx 2018 - smear negative and no growth in 1 week   While pseudomonas was sensitive to levaquin, the stenotrophomonas in  was resistant to levaquin.       RADIOLOGY:  [X] Reviewed and interpreted by me - No new focal findings.

## 2019-02-04 NOTE — H&P ADULT - NSHPSOCIALHISTORY_GEN_ALL_CORE
Lives with   No history of smoking  No history of alcohol abuse  No history of illegal drug use   Lives with   No history of smoking  No history of alcohol abuse  No history of illegal drug use    Uses a cane occasionally during periods of breathing difficulties (or holds on to objects, or is assisted by her )   Lives with   No history of smoking  No history of second hand smoking  No history of alcohol abuse  No history of illegal drug use    Uses a cane occasionally during periods of breathing difficulties (or holds on to objects, or is assisted by her )

## 2019-02-04 NOTE — PROGRESS NOTE ADULT - ASSESSMENT
· Assessment	  63 year old female, with past history significant for HTN, Type-II DM, Bronchiectasis (cystic), Allergic bronchopulmonary Aspergillosis, Stenotrophomonas colonization and Chronic cough, presented to the ED secondary to worsening shortness of breath.  Vital signs upon ED presentation as follows: BP = 129/73, HR = 95, RR = 16, T = 37 C (98.6 F), O2 Sat = 100% on RA.  Diagnosed with Bronchiectasis, Dyspnea on Exertion, Cough and Shortness of Breath.  Admitted for further management of:     Problem/Plan - 1:  ·  Problem: Bronchiectasis with acute exacerbation.  Plan: - symptomatic with cough (productive or copious yellowish phlegm), shortness of breath, significant PEMBERTON (with minimal activity level), low grade temp (99.9F), chills, sweating, chest tightness w/ coughing, feeling as if about to stop breathing despite using 2 pillows nightly, for the past ~ 15 days, but did not present because of hope for improvement  -  Pul eval noted.  Neb/O2        Problem/Plan - 2:  ·  Problem: Cough.  Plan: - copious yellow phlegm, per patient  - in the setting of bronchiectasis  - HOB elevation       Problem/Plan - 3:  ·  Problem: PEMBERTON (dyspnea on exertion).  Plan: - respiratory difficulties even with minimum exertion  - therapies as above  - supplemental oxygen as/if needed (not using presently).      Problem/Plan - 4:  ·  Problem: Xerostomia due to mouth breathing.  Plan: - significantly dry and involving the throat as sequela of persistent nasal congestion  Neb     Problem/Plan - 5:  ·  Problem: Type 2 diabetes mellitus with hyperglycemia, with long-term current use of insulin.     FSSS/Levaquin/Duoneb  -

## 2019-02-04 NOTE — H&P ADULT - PROBLEM SELECTOR PLAN 2
- copious yellow phlegm, per patient  - in the setting of bronchiectasis  - hycodan 5 mL Q4H x 6 doses  - HOB elevation  - chest percussion for airway clearance - copious yellow phlegm, per patient  - in the setting of bronchiectasis  - s/p hycodan x one in the ED; holding for now  - expectorant - Guaifenesin Q6H PRN  - HOB elevation  - chest percussion for airway clearance

## 2019-02-04 NOTE — H&P ADULT - NSHPLANGTRANSLATORFT_GEN_A_CORE
Translated in [place] by LUZ Irene.  Patient is both Leandro and [place] speaking Translated in [place] by LUZ Card.  Patient is both Leandro and [place] speaking

## 2019-02-04 NOTE — H&P ADULT - PROBLEM SELECTOR PLAN 4
- significantly dry and involving the throat as sequela of persistent nasal congestion  - no improvement, even with frequent sips of water  - difficulty swallowing food bolus unless accompanied by liquids  - barium swallow in the past (per pulmonologist's note) was negative for aspiration "except for one instance of laryngeal penetration with full retrieval on thin liquid PO trials."  - biotene prescribed  - holding nystatin for now (evaluate for restart)  - IVF hydration  - on saline nasal spray BID  - on IV steroid, so will hold fluticasone for now  - f/u for improvement

## 2019-02-04 NOTE — H&P ADULT - FAMILY HISTORY
No pertinent family history in first degree relatives Father  Still living? Unknown  Family history of diabetes mellitus, Age at diagnosis: Age Unknown Father  Still living? Unknown  Family history of diabetes mellitus, Age at diagnosis: Age Unknown  Family history of bronchitis, Age at diagnosis: Age Unknown     Child  Still living? Unknown  Family history of allergic rhinitis, Age at diagnosis: Age Unknown     Child  Still living? Unknown  Family history of allergic rhinitis, Age at diagnosis: Age Unknown     Mother  Still living? Unknown  Family history of bronchitis, Age at diagnosis: Age Unknown

## 2019-02-05 DIAGNOSIS — Z71.89 OTHER SPECIFIED COUNSELING: ICD-10-CM

## 2019-02-05 LAB
ALBUMIN SERPL ELPH-MCNC: 3.5 G/DL — SIGNIFICANT CHANGE UP (ref 3.3–5)
ALP SERPL-CCNC: 153 U/L — HIGH (ref 40–120)
ALT FLD-CCNC: 44 U/L — HIGH (ref 4–33)
ANION GAP SERPL CALC-SCNC: 10 MMO/L — SIGNIFICANT CHANGE UP (ref 7–14)
ANION GAP SERPL CALC-SCNC: 10 MMO/L — SIGNIFICANT CHANGE UP (ref 7–14)
AST SERPL-CCNC: 36 U/L — HIGH (ref 4–32)
BACTERIA UR CULT: SIGNIFICANT CHANGE UP
BASOPHILS # BLD AUTO: 0.01 K/UL — SIGNIFICANT CHANGE UP (ref 0–0.2)
BASOPHILS NFR BLD AUTO: 0.1 % — SIGNIFICANT CHANGE UP (ref 0–2)
BILIRUB SERPL-MCNC: 0.2 MG/DL — SIGNIFICANT CHANGE UP (ref 0.2–1.2)
BUN SERPL-MCNC: 21 MG/DL — SIGNIFICANT CHANGE UP (ref 7–23)
BUN SERPL-MCNC: 21 MG/DL — SIGNIFICANT CHANGE UP (ref 7–23)
CALCIUM SERPL-MCNC: 9.2 MG/DL — SIGNIFICANT CHANGE UP (ref 8.4–10.5)
CALCIUM SERPL-MCNC: 9.2 MG/DL — SIGNIFICANT CHANGE UP (ref 8.4–10.5)
CHLORIDE SERPL-SCNC: 100 MMOL/L — SIGNIFICANT CHANGE UP (ref 98–107)
CHLORIDE SERPL-SCNC: 100 MMOL/L — SIGNIFICANT CHANGE UP (ref 98–107)
CO2 SERPL-SCNC: 23 MMOL/L — SIGNIFICANT CHANGE UP (ref 22–31)
CO2 SERPL-SCNC: 23 MMOL/L — SIGNIFICANT CHANGE UP (ref 22–31)
CREAT SERPL-MCNC: 0.66 MG/DL — SIGNIFICANT CHANGE UP (ref 0.5–1.3)
CREAT SERPL-MCNC: 0.66 MG/DL — SIGNIFICANT CHANGE UP (ref 0.5–1.3)
CULTURE - ACID FAST SMEAR CONCENTRATED: SIGNIFICANT CHANGE UP
CULTURE - ACID FAST SMEAR CONCENTRATED: SIGNIFICANT CHANGE UP
EOSINOPHIL # BLD AUTO: 0 K/UL — SIGNIFICANT CHANGE UP (ref 0–0.5)
EOSINOPHIL NFR BLD AUTO: 0 % — SIGNIFICANT CHANGE UP (ref 0–6)
GLUCOSE BLDC GLUCOMTR-MCNC: 104 MG/DL — HIGH (ref 70–99)
GLUCOSE BLDC GLUCOMTR-MCNC: 171 MG/DL — HIGH (ref 70–99)
GLUCOSE BLDC GLUCOMTR-MCNC: 222 MG/DL — HIGH (ref 70–99)
GLUCOSE BLDC GLUCOMTR-MCNC: 291 MG/DL — HIGH (ref 70–99)
GLUCOSE SERPL-MCNC: 362 MG/DL — HIGH (ref 70–99)
GLUCOSE SERPL-MCNC: 362 MG/DL — HIGH (ref 70–99)
GRAM STN SPT: SIGNIFICANT CHANGE UP
HCT VFR BLD CALC: 31.4 % — LOW (ref 34.5–45)
HCT VFR BLD CALC: 31.4 % — LOW (ref 34.5–45)
HGB BLD-MCNC: 9.7 G/DL — LOW (ref 11.5–15.5)
HGB BLD-MCNC: 9.7 G/DL — LOW (ref 11.5–15.5)
IGE SERPL-ACNC: 7 IU/ML — SIGNIFICANT CHANGE UP (ref 0–100)
IMM GRANULOCYTES NFR BLD AUTO: 0.5 % — SIGNIFICANT CHANGE UP (ref 0–1.5)
LYMPHOCYTES # BLD AUTO: 1.1 K/UL — SIGNIFICANT CHANGE UP (ref 1–3.3)
LYMPHOCYTES # BLD AUTO: 11.1 % — LOW (ref 13–44)
MAGNESIUM SERPL-MCNC: 1.9 MG/DL — SIGNIFICANT CHANGE UP (ref 1.6–2.6)
MCHC RBC-ENTMCNC: 22.3 PG — LOW (ref 27–34)
MCHC RBC-ENTMCNC: 22.3 PG — LOW (ref 27–34)
MCHC RBC-ENTMCNC: 30.9 % — LOW (ref 32–36)
MCHC RBC-ENTMCNC: 30.9 % — LOW (ref 32–36)
MCV RBC AUTO: 72.2 FL — LOW (ref 80–100)
MCV RBC AUTO: 72.2 FL — LOW (ref 80–100)
MONOCYTES # BLD AUTO: 0.43 K/UL — SIGNIFICANT CHANGE UP (ref 0–0.9)
MONOCYTES NFR BLD AUTO: 4.3 % — SIGNIFICANT CHANGE UP (ref 2–14)
NEUTROPHILS # BLD AUTO: 8.34 K/UL — HIGH (ref 1.8–7.4)
NEUTROPHILS NFR BLD AUTO: 84 % — HIGH (ref 43–77)
NRBC # FLD: 0 K/UL — LOW (ref 25–125)
NRBC # FLD: 0 K/UL — LOW (ref 25–125)
PHOSPHATE SERPL-MCNC: 3 MG/DL — SIGNIFICANT CHANGE UP (ref 2.5–4.5)
PLATELET # BLD AUTO: 227 K/UL — SIGNIFICANT CHANGE UP (ref 150–400)
PLATELET # BLD AUTO: 227 K/UL — SIGNIFICANT CHANGE UP (ref 150–400)
PMV BLD: 11.1 FL — SIGNIFICANT CHANGE UP (ref 7–13)
PMV BLD: 11.1 FL — SIGNIFICANT CHANGE UP (ref 7–13)
POTASSIUM SERPL-MCNC: 4.2 MMOL/L — SIGNIFICANT CHANGE UP (ref 3.5–5.3)
POTASSIUM SERPL-MCNC: 4.2 MMOL/L — SIGNIFICANT CHANGE UP (ref 3.5–5.3)
POTASSIUM SERPL-SCNC: 4.2 MMOL/L — SIGNIFICANT CHANGE UP (ref 3.5–5.3)
POTASSIUM SERPL-SCNC: 4.2 MMOL/L — SIGNIFICANT CHANGE UP (ref 3.5–5.3)
PROT SERPL-MCNC: 6.7 G/DL — SIGNIFICANT CHANGE UP (ref 6–8.3)
RBC # BLD: 4.35 M/UL — SIGNIFICANT CHANGE UP (ref 3.8–5.2)
RBC # BLD: 4.35 M/UL — SIGNIFICANT CHANGE UP (ref 3.8–5.2)
RBC # FLD: 17 % — HIGH (ref 10.3–14.5)
RBC # FLD: 17 % — HIGH (ref 10.3–14.5)
SODIUM SERPL-SCNC: 133 MMOL/L — LOW (ref 135–145)
SODIUM SERPL-SCNC: 133 MMOL/L — LOW (ref 135–145)
SPECIMEN SOURCE: SIGNIFICANT CHANGE UP
WBC # BLD: 9.93 K/UL — SIGNIFICANT CHANGE UP (ref 3.8–10.5)
WBC # BLD: 9.93 K/UL — SIGNIFICANT CHANGE UP (ref 3.8–10.5)
WBC # FLD AUTO: 9.93 K/UL — SIGNIFICANT CHANGE UP (ref 3.8–10.5)
WBC # FLD AUTO: 9.93 K/UL — SIGNIFICANT CHANGE UP (ref 3.8–10.5)

## 2019-02-05 PROCEDURE — 99232 SBSQ HOSP IP/OBS MODERATE 35: CPT

## 2019-02-05 PROCEDURE — 99233 SBSQ HOSP IP/OBS HIGH 50: CPT | Mod: GC

## 2019-02-05 RX ADMIN — Medication 1 TABLET(S): at 12:11

## 2019-02-05 RX ADMIN — Medication 4: at 12:10

## 2019-02-05 RX ADMIN — Medication 3 MILLILITER(S): at 10:13

## 2019-02-05 RX ADMIN — Medication 2 TABLET(S): at 17:23

## 2019-02-05 RX ADMIN — Medication 100 MILLIGRAM(S): at 17:23

## 2019-02-05 RX ADMIN — Medication 2: at 17:23

## 2019-02-05 RX ADMIN — Medication 1 MILLIGRAM(S): at 12:11

## 2019-02-05 RX ADMIN — Medication 1 TABLET(S): at 05:28

## 2019-02-05 RX ADMIN — Medication 1 TABLET(S): at 05:29

## 2019-02-05 RX ADMIN — INSULIN GLARGINE 20 UNIT(S): 100 INJECTION, SOLUTION SUBCUTANEOUS at 21:59

## 2019-02-05 RX ADMIN — LOSARTAN POTASSIUM 50 MILLIGRAM(S): 100 TABLET, FILM COATED ORAL at 05:28

## 2019-02-05 RX ADMIN — AMLODIPINE BESYLATE 5 MILLIGRAM(S): 2.5 TABLET ORAL at 05:29

## 2019-02-05 RX ADMIN — PREGABALIN 1000 MICROGRAM(S): 225 CAPSULE ORAL at 12:11

## 2019-02-05 RX ADMIN — Medication 3 MILLILITER(S): at 04:56

## 2019-02-05 RX ADMIN — Medication 6: at 08:39

## 2019-02-05 RX ADMIN — SENNA PLUS 1 TABLET(S): 8.6 TABLET ORAL at 21:59

## 2019-02-05 RX ADMIN — Medication 1 TABLET(S): at 17:23

## 2019-02-05 RX ADMIN — Medication 1 SPRAY(S): at 05:28

## 2019-02-05 RX ADMIN — ENOXAPARIN SODIUM 40 MILLIGRAM(S): 100 INJECTION SUBCUTANEOUS at 05:29

## 2019-02-05 RX ADMIN — Medication 1 SPRAY(S): at 17:23

## 2019-02-05 RX ADMIN — Medication 3 MILLILITER(S): at 15:25

## 2019-02-05 RX ADMIN — Medication 100 MILLIGRAM(S): at 05:28

## 2019-02-05 RX ADMIN — MONTELUKAST 10 MILLIGRAM(S): 4 TABLET, CHEWABLE ORAL at 12:11

## 2019-02-05 RX ADMIN — Medication 3 MILLILITER(S): at 21:52

## 2019-02-05 NOTE — PROGRESS NOTE ADULT - ASSESSMENT
· Assessment	  63 year old female, with past history significant for HTN, Type-II DM, Bronchiectasis (cystic), Allergic bronchopulmonary Aspergillosis, Stenotrophomonas colonization and Chronic cough, presented to the ED secondary to worsening shortness of breath.  Vital signs upon ED presentation as follows: BP = 129/73, HR = 95, RR = 16, T = 37 C (98.6 F), O2 Sat = 100% on RA.  Diagnosed with Bronchiectasis, Dyspnea on Exertion, Cough and Shortness of Breath.  Admitted for further management of:     Problem/Plan - 1:  ·  Problem: Bronchiectasis with acute exacerbation.  Plan: - symptomatic with cough (productive or copious yellowish phlegm), shortness of breath, significant PEMBERTON (with minimal activity level), low grade temp (99.9F), chills, sweating, chest tightness w/ coughing, feeling as if about to stop breathing despite using 2 pillows nightly, for the past ~ 15 days, but did not present because of hope for improvement  -  Pul f/up noted.  Neb/O2      Problem/Plan - 2:  ·  Problem: Cough.  Plan: - copious yellow phlegm, per patient  - in the setting of bronchiectasis  - HOB elevation     Problem/Plan - 3:  ·  Problem: PEMBERTON (dyspnea on exertion).  Plan: - respiratory difficulties even with minimum exertion  - therapies as above  - supplemental oxygen as/if needed (not using presently).      Problem/Plan - 4:  ·  Problem: Xerostomia due to mouth breathing.  Plan: - significantly dry and involving the throat as sequela of persistent nasal congestion  Neb     Problem/Plan - 5:  ·  Problem: Type 2 diabetes mellitus with hyperglycemia, with long-term current use of insulin.     FSSS/Levaquin/Duoneb  -

## 2019-02-05 NOTE — PHYSICAL THERAPY INITIAL EVALUATION ADULT - ADDITIONAL COMMENTS
Patient's daughter reported that pt. prefers to have a family member present when performing ADLs.     Pt. was left supine in bed post PT Evaluation, NAD, all lines intact, call bell within reach. RN made aware of pt. status and participation in PT.

## 2019-02-05 NOTE — PHYSICAL THERAPY INITIAL EVALUATION ADULT - PERTINENT HX OF CURRENT PROBLEM, REHAB EVAL
Pt. admitted for bronchiectasis, dyspnea on exertion, cough and shortness of breath. Bronchiectasis with acute exacerbation. PMH of  HTN, Type-II DM, Bronchiectasis (cystic), Allergic bronchopulmonary Aspergillosis, Stenotrophomonas colonization and Chronic cough.

## 2019-02-05 NOTE — PROGRESS NOTE ADULT - ASSESSMENT
63 year old female, with past history significant for HTN, Type-II DM, Bronchiectasis (cystic) with a Primary Ciliary Dyskinesia gene variant, ?Allergic bronchopulmonary Aspergillosis, Stenotrophomonas colonization and Chronic cough, presented to the ED secondary to worsening shortness of breath. Unclear if bronchiectasis exacerbation but would treat like one. Patient also had a previous AFB culture, which was positive, but not TB. Unclear, and will likely need repeat cultures and then consider treatment of NTM if positive.    Recommendations:  - Levaquin and Bactrim.   - Check sputum culture again.  - Repeat sputum AFB for NTM/BRENTON. Follow up  - Continue patient on airway clearance with chest percussion vest as well as flutter valve/aerobika.  - Patient unable to tolerate hypersal. Would use bronchodilators prior to percussion therapy.  - Flonase for post nasal drip.   - Ambulate as tolerated.     Jad Gaviria MD  Fellow (PGY 6),  Pulmonary & Critical Care Medicine.  Pager - [phone] ([facility])/ [phone] ([place])

## 2019-02-05 NOTE — PHYSICAL THERAPY INITIAL EVALUATION ADULT - GAIT DISTANCE, PT EVAL
Distance limited secondary to pt. reporting feeling SOB. SpO2 90% post ambulation, 95% with rest./10 feet

## 2019-02-05 NOTE — PROGRESS NOTE ADULT - ASSESSMENT
63 year old with poorly controlled DM, COPD, and bronchiectasis presents with acute on chronic shortness of breath with cough    1) Bronchiectasis/ COPD exacerbation: poor control in past. States she has been taking her treatments since her last pulmonary visit.    CXR without new consolidation. Afebrile with normal WBC.    In past- colonized with psuedomonas and S. maltophilia-    Continue levaquin- most recent pseudomonas is sensitive  Okay to add Bactrim - one prior S maltophilia was resistant to levaquin    Repeat sputum culture    2) AFB- one positive AFB cx in past for BRENTON  Repeat sputum AFB- no need to isolate    3) Pseudomonas culture: sensitive to fluroquinone  Change azithromycin/ ceftriaxone to levaquin

## 2019-02-05 NOTE — PHYSICAL THERAPY INITIAL EVALUATION ADULT - GENERAL OBSERVATIONS, REHAB EVAL
Consult received, chart reviewed. Patient received supine in bed, NAD, +pulse ox. Patient agreed to Evaluation from Physical Therapist.

## 2019-02-05 NOTE — PHYSICAL THERAPY INITIAL EVALUATION ADULT - CRITERIA FOR SKILLED THERAPEUTIC INTERVENTIONS
therapy frequency/predicted duration of therapy intervention/impairments found/anticipated discharge recommendation/anticipated equipment needs at discharge/rehab potential

## 2019-02-06 LAB
ALBUMIN SERPL ELPH-MCNC: 3.5 G/DL — SIGNIFICANT CHANGE UP (ref 3.3–5)
ALP SERPL-CCNC: 143 U/L — HIGH (ref 40–120)
ALT FLD-CCNC: 44 U/L — HIGH (ref 4–33)
ANION GAP SERPL CALC-SCNC: 10 MMO/L — SIGNIFICANT CHANGE UP (ref 7–14)
AST SERPL-CCNC: 30 U/L — SIGNIFICANT CHANGE UP (ref 4–32)
BASOPHILS # BLD AUTO: 0.05 K/UL — SIGNIFICANT CHANGE UP (ref 0–0.2)
BASOPHILS NFR BLD AUTO: 0.4 % — SIGNIFICANT CHANGE UP (ref 0–2)
BILIRUB SERPL-MCNC: 0.3 MG/DL — SIGNIFICANT CHANGE UP (ref 0.2–1.2)
BUN SERPL-MCNC: 25 MG/DL — HIGH (ref 7–23)
CALCIUM SERPL-MCNC: 9.1 MG/DL — SIGNIFICANT CHANGE UP (ref 8.4–10.5)
CHLORIDE SERPL-SCNC: 103 MMOL/L — SIGNIFICANT CHANGE UP (ref 98–107)
CO2 SERPL-SCNC: 24 MMOL/L — SIGNIFICANT CHANGE UP (ref 22–31)
CREAT SERPL-MCNC: 0.85 MG/DL — SIGNIFICANT CHANGE UP (ref 0.5–1.3)
EOSINOPHIL # BLD AUTO: 0.13 K/UL — SIGNIFICANT CHANGE UP (ref 0–0.5)
EOSINOPHIL NFR BLD AUTO: 1.1 % — SIGNIFICANT CHANGE UP (ref 0–6)
GLUCOSE BLDC GLUCOMTR-MCNC: 109 MG/DL — HIGH (ref 70–99)
GLUCOSE BLDC GLUCOMTR-MCNC: 129 MG/DL — HIGH (ref 70–99)
GLUCOSE BLDC GLUCOMTR-MCNC: 148 MG/DL — HIGH (ref 70–99)
GLUCOSE BLDC GLUCOMTR-MCNC: 181 MG/DL — HIGH (ref 70–99)
GLUCOSE BLDC GLUCOMTR-MCNC: 195 MG/DL — HIGH (ref 70–99)
GLUCOSE SERPL-MCNC: 79 MG/DL — SIGNIFICANT CHANGE UP (ref 70–99)
HCT VFR BLD CALC: 36.6 % — SIGNIFICANT CHANGE UP (ref 34.5–45)
HGB BLD-MCNC: 11 G/DL — LOW (ref 11.5–15.5)
IMM GRANULOCYTES NFR BLD AUTO: 0.3 % — SIGNIFICANT CHANGE UP (ref 0–1.5)
LYMPHOCYTES # BLD AUTO: 28.8 % — SIGNIFICANT CHANGE UP (ref 13–44)
LYMPHOCYTES # BLD AUTO: 3.35 K/UL — HIGH (ref 1–3.3)
M TB IFN-G BLD-IMP: NEGATIVE
MAGNESIUM SERPL-MCNC: 1.9 MG/DL — SIGNIFICANT CHANGE UP (ref 1.6–2.6)
MCHC RBC-ENTMCNC: 21.6 PG — LOW (ref 27–34)
MCHC RBC-ENTMCNC: 30.1 % — LOW (ref 32–36)
MCV RBC AUTO: 71.9 FL — LOW (ref 80–100)
MONOCYTES # BLD AUTO: 0.98 K/UL — HIGH (ref 0–0.9)
MONOCYTES NFR BLD AUTO: 8.4 % — SIGNIFICANT CHANGE UP (ref 2–14)
NEUTROPHILS # BLD AUTO: 7.07 K/UL — SIGNIFICANT CHANGE UP (ref 1.8–7.4)
NEUTROPHILS NFR BLD AUTO: 61 % — SIGNIFICANT CHANGE UP (ref 43–77)
NRBC # FLD: 0 K/UL — LOW (ref 25–125)
PHOSPHATE SERPL-MCNC: 2.8 MG/DL — SIGNIFICANT CHANGE UP (ref 2.5–4.5)
PLATELET # BLD AUTO: 235 K/UL — SIGNIFICANT CHANGE UP (ref 150–400)
PMV BLD: 10.6 FL — SIGNIFICANT CHANGE UP (ref 7–13)
POTASSIUM SERPL-MCNC: 3.8 MMOL/L — SIGNIFICANT CHANGE UP (ref 3.5–5.3)
POTASSIUM SERPL-SCNC: 3.8 MMOL/L — SIGNIFICANT CHANGE UP (ref 3.5–5.3)
PROT SERPL-MCNC: 7 G/DL — SIGNIFICANT CHANGE UP (ref 6–8.3)
QUANTIFERON TB PLUS MITOGEN MINUS NIL: 6.79 IU/ML
QUANTIFERON TB PLUS NIL: 0.05 IU/ML
QUANTIFERON TB PLUS TB1 MINUS NIL: 0.14 IU/ML
QUANTIFERON TB PLUS TB2 MINUS NIL: 0.19 IU/ML
RBC # BLD: 5.09 M/UL — SIGNIFICANT CHANGE UP (ref 3.8–5.2)
RBC # FLD: 17.2 % — HIGH (ref 10.3–14.5)
SODIUM SERPL-SCNC: 137 MMOL/L — SIGNIFICANT CHANGE UP (ref 135–145)
WBC # BLD: 11.62 K/UL — HIGH (ref 3.8–10.5)
WBC # FLD AUTO: 11.62 K/UL — HIGH (ref 3.8–10.5)

## 2019-02-06 PROCEDURE — 99232 SBSQ HOSP IP/OBS MODERATE 35: CPT

## 2019-02-06 RX ADMIN — Medication 200 MILLIGRAM(S): at 23:05

## 2019-02-06 RX ADMIN — Medication 1 MILLIGRAM(S): at 11:41

## 2019-02-06 RX ADMIN — Medication 3 MILLILITER(S): at 22:01

## 2019-02-06 RX ADMIN — Medication 1 SPRAY(S): at 17:43

## 2019-02-06 RX ADMIN — Medication 3 MILLILITER(S): at 11:05

## 2019-02-06 RX ADMIN — Medication 100 MILLIGRAM(S): at 17:43

## 2019-02-06 RX ADMIN — ENOXAPARIN SODIUM 40 MILLIGRAM(S): 100 INJECTION SUBCUTANEOUS at 05:38

## 2019-02-06 RX ADMIN — INSULIN GLARGINE 20 UNIT(S): 100 INJECTION, SOLUTION SUBCUTANEOUS at 23:05

## 2019-02-06 RX ADMIN — Medication 2 TABLET(S): at 05:38

## 2019-02-06 RX ADMIN — Medication 2 TABLET(S): at 17:43

## 2019-02-06 RX ADMIN — Medication 2: at 08:09

## 2019-02-06 RX ADMIN — MONTELUKAST 10 MILLIGRAM(S): 4 TABLET, CHEWABLE ORAL at 11:42

## 2019-02-06 RX ADMIN — SENNA PLUS 1 TABLET(S): 8.6 TABLET ORAL at 23:05

## 2019-02-06 RX ADMIN — Medication 1 SPRAY(S): at 05:39

## 2019-02-06 RX ADMIN — Medication 1 TABLET(S): at 11:41

## 2019-02-06 RX ADMIN — Medication 100 MILLIGRAM(S): at 05:38

## 2019-02-06 RX ADMIN — Medication 1 TABLET(S): at 17:43

## 2019-02-06 RX ADMIN — Medication 30 MILLILITER(S): at 05:39

## 2019-02-06 RX ADMIN — Medication 1 TABLET(S): at 05:38

## 2019-02-06 RX ADMIN — Medication 3 MILLILITER(S): at 16:43

## 2019-02-06 RX ADMIN — Medication 3 MILLILITER(S): at 03:41

## 2019-02-06 RX ADMIN — PREGABALIN 1000 MICROGRAM(S): 225 CAPSULE ORAL at 11:41

## 2019-02-06 NOTE — PROGRESS NOTE ADULT - ASSESSMENT
· Assessment	  63 year old female, with past history significant for HTN, Type-II DM, Bronchiectasis (cystic), Allergic bronchopulmonary Aspergillosis, Stenotrophomonas colonization and Chronic cough, presented to the ED secondary to worsening shortness of breath.  Vital signs upon ED presentation as follows: BP = 129/73, HR = 95, RR = 16, T = 37 C (98.6 F), O2 Sat = 100% on RA.  Diagnosed with Bronchiectasis, Dyspnea on Exertion, Cough and Shortness of Breath.  Admitted for further management of:     Problem/Plan - 1:  ·  Problem: Bronchiectasis with acute exacerbation.  Plan: - Sputum: Pseudomonas/IV Levaquin  -  Pul/ID f/up noted.  Neb/O2      Problem/Plan - 2:  ·  Problem: Cough.  Plan: - copious yellow phlegm, per patient  - in the setting of bronchiectasis  - HOB elevation     Problem/Plan - 3:  ·  Problem: PEMBERTON (dyspnea on exertion).  Plan: - respiratory difficulties even with minimum exertion  - therapies as above  - supplemental oxygen as/if needed (not using presently).      Problem/Plan - 5:  ·  Problem: Type 2 diabetes mellitus with hyperglycemia, with long-term current use of insulin.     FSSS/Levaquin/Duoneb  -

## 2019-02-06 NOTE — PROVIDER CONTACT NOTE (MEDICATION) - ASSESSMENT
Pt A&Ox4 laying comfortably in bed with /66 HR 84 with multiple PO BP meds due to be given at this time. PO Norvasc parameters to hold for SBP below 120 and PO Losartan parameters to hold for SBP below 115. ADS V[place]se aware.

## 2019-02-06 NOTE — PROGRESS NOTE ADULT - ASSESSMENT
63 year old with poorly controlled DM, COPD, and bronchiectasis presents with acute on chronic shortness of breath with cough    1) Bronchiectasis/ COPD exacerbation: poor control in past. States she has been taking her treatments since her last pulmonary visit.    CXR without new consolidation. Afebrile with normal WBC.    In past- colonized with psuedomonas and S. maltophilia-    Continue levaquin- most recent pseudomonas is sensitive  Okay to add Bactrim - one prior S maltophilia was resistant to levaquin    Repeat sputum culture testing    2) AFB- one positive AFB cx in past for BRENTON  Repeat sputum AFB- no need to isolate  Check three sputums for AFB while here    3) Pseudomonas culture: sensitive to fluroquinone  Change azithromycin/ ceftriaxone to levaquin

## 2019-02-06 NOTE — PROVIDER CONTACT NOTE (MEDICATION) - ACTION/TREATMENT ORDERED:
As per ADS, hold PO BP meds at this time as per parameters to hold, no further interventions, continuing to monitor.

## 2019-02-07 LAB
ALBUMIN SERPL ELPH-MCNC: 3.6 G/DL — SIGNIFICANT CHANGE UP (ref 3.3–5)
ALP SERPL-CCNC: 138 U/L — HIGH (ref 40–120)
ALT FLD-CCNC: 41 U/L — HIGH (ref 4–33)
ANION GAP SERPL CALC-SCNC: 13 MMO/L — SIGNIFICANT CHANGE UP (ref 7–14)
AST SERPL-CCNC: 34 U/L — HIGH (ref 4–32)
BILIRUB SERPL-MCNC: < 0.2 MG/DL — LOW (ref 0.2–1.2)
BUN SERPL-MCNC: 18 MG/DL — SIGNIFICANT CHANGE UP (ref 7–23)
CALCIUM SERPL-MCNC: 8.9 MG/DL — SIGNIFICANT CHANGE UP (ref 8.4–10.5)
CHLORIDE SERPL-SCNC: 101 MMOL/L — SIGNIFICANT CHANGE UP (ref 98–107)
CO2 SERPL-SCNC: 22 MMOL/L — SIGNIFICANT CHANGE UP (ref 22–31)
CREAT SERPL-MCNC: 0.72 MG/DL — SIGNIFICANT CHANGE UP (ref 0.5–1.3)
CULTURE - ACID FAST SMEAR CONCENTRATED: SIGNIFICANT CHANGE UP
GLUCOSE BLDC GLUCOMTR-MCNC: 143 MG/DL — HIGH (ref 70–99)
GLUCOSE BLDC GLUCOMTR-MCNC: 163 MG/DL — HIGH (ref 70–99)
GLUCOSE BLDC GLUCOMTR-MCNC: 216 MG/DL — HIGH (ref 70–99)
GLUCOSE BLDC GLUCOMTR-MCNC: 71 MG/DL — SIGNIFICANT CHANGE UP (ref 70–99)
GLUCOSE SERPL-MCNC: 114 MG/DL — HIGH (ref 70–99)
HCT VFR BLD CALC: 35.5 % — SIGNIFICANT CHANGE UP (ref 34.5–45)
HGB BLD-MCNC: 10.9 G/DL — LOW (ref 11.5–15.5)
MAGNESIUM SERPL-MCNC: 2 MG/DL — SIGNIFICANT CHANGE UP (ref 1.6–2.6)
MCHC RBC-ENTMCNC: 21.8 PG — LOW (ref 27–34)
MCHC RBC-ENTMCNC: 30.7 % — LOW (ref 32–36)
MCV RBC AUTO: 71 FL — LOW (ref 80–100)
NRBC # FLD: 0 K/UL — LOW (ref 25–125)
PHOSPHATE SERPL-MCNC: 2.8 MG/DL — SIGNIFICANT CHANGE UP (ref 2.5–4.5)
PLATELET # BLD AUTO: 273 K/UL — SIGNIFICANT CHANGE UP (ref 150–400)
PMV BLD: 11.1 FL — SIGNIFICANT CHANGE UP (ref 7–13)
POTASSIUM SERPL-MCNC: 4 MMOL/L — SIGNIFICANT CHANGE UP (ref 3.5–5.3)
POTASSIUM SERPL-SCNC: 4 MMOL/L — SIGNIFICANT CHANGE UP (ref 3.5–5.3)
PROT SERPL-MCNC: 7 G/DL — SIGNIFICANT CHANGE UP (ref 6–8.3)
RBC # BLD: 5 M/UL — SIGNIFICANT CHANGE UP (ref 3.8–5.2)
RBC # FLD: 17.4 % — HIGH (ref 10.3–14.5)
SODIUM SERPL-SCNC: 136 MMOL/L — SIGNIFICANT CHANGE UP (ref 135–145)
SPECIMEN SOURCE: SIGNIFICANT CHANGE UP
WBC # BLD: 10.41 K/UL — SIGNIFICANT CHANGE UP (ref 3.8–10.5)
WBC # FLD AUTO: 10.41 K/UL — SIGNIFICANT CHANGE UP (ref 3.8–10.5)

## 2019-02-07 PROCEDURE — 99232 SBSQ HOSP IP/OBS MODERATE 35: CPT

## 2019-02-07 PROCEDURE — 99233 SBSQ HOSP IP/OBS HIGH 50: CPT | Mod: GC

## 2019-02-07 RX ORDER — SENNA PLUS 8.6 MG/1
1 TABLET ORAL DAILY
Qty: 0 | Refills: 0 | Status: DISCONTINUED | OUTPATIENT
Start: 2019-02-07 | End: 2019-02-12

## 2019-02-07 RX ORDER — DOCUSATE SODIUM 100 MG
100 CAPSULE ORAL DAILY
Qty: 0 | Refills: 0 | Status: DISCONTINUED | OUTPATIENT
Start: 2019-02-07 | End: 2019-02-12

## 2019-02-07 RX ORDER — PANTOPRAZOLE SODIUM 20 MG/1
40 TABLET, DELAYED RELEASE ORAL
Qty: 0 | Refills: 0 | Status: DISCONTINUED | OUTPATIENT
Start: 2019-02-07 | End: 2019-02-13

## 2019-02-07 RX ADMIN — PANTOPRAZOLE SODIUM 40 MILLIGRAM(S): 20 TABLET, DELAYED RELEASE ORAL at 09:57

## 2019-02-07 RX ADMIN — AMLODIPINE BESYLATE 5 MILLIGRAM(S): 2.5 TABLET ORAL at 05:55

## 2019-02-07 RX ADMIN — Medication 3 MILLILITER(S): at 17:20

## 2019-02-07 RX ADMIN — Medication 1 SPRAY(S): at 17:16

## 2019-02-07 RX ADMIN — Medication 3 MILLILITER(S): at 21:01

## 2019-02-07 RX ADMIN — Medication 2 TABLET(S): at 05:54

## 2019-02-07 RX ADMIN — Medication 2 TABLET(S): at 17:16

## 2019-02-07 RX ADMIN — Medication 1 TABLET(S): at 17:16

## 2019-02-07 RX ADMIN — PREGABALIN 1000 MICROGRAM(S): 225 CAPSULE ORAL at 17:16

## 2019-02-07 RX ADMIN — Medication 100 MILLIGRAM(S): at 05:55

## 2019-02-07 RX ADMIN — Medication 1 TABLET(S): at 12:04

## 2019-02-07 RX ADMIN — Medication 4: at 08:18

## 2019-02-07 RX ADMIN — INSULIN GLARGINE 20 UNIT(S): 100 INJECTION, SOLUTION SUBCUTANEOUS at 21:28

## 2019-02-07 RX ADMIN — Medication 1 MILLIGRAM(S): at 12:04

## 2019-02-07 RX ADMIN — Medication 3 MILLILITER(S): at 03:08

## 2019-02-07 RX ADMIN — SENNA PLUS 1 TABLET(S): 8.6 TABLET ORAL at 21:28

## 2019-02-07 RX ADMIN — Medication 1 SPRAY(S): at 05:55

## 2019-02-07 RX ADMIN — ENOXAPARIN SODIUM 40 MILLIGRAM(S): 100 INJECTION SUBCUTANEOUS at 05:55

## 2019-02-07 RX ADMIN — MONTELUKAST 10 MILLIGRAM(S): 4 TABLET, CHEWABLE ORAL at 12:05

## 2019-02-07 RX ADMIN — Medication 30 MILLILITER(S): at 09:57

## 2019-02-07 RX ADMIN — Medication 1 TABLET(S): at 05:55

## 2019-02-07 RX ADMIN — Medication 100 MILLIGRAM(S): at 17:16

## 2019-02-07 RX ADMIN — Medication 3 MILLILITER(S): at 10:27

## 2019-02-07 RX ADMIN — LOSARTAN POTASSIUM 50 MILLIGRAM(S): 100 TABLET, FILM COATED ORAL at 05:54

## 2019-02-07 NOTE — PROGRESS NOTE ADULT - ASSESSMENT
63 year old with poorly controlled DM, COPD, and bronchiectasis presents with acute on chronic shortness of breath with cough    1) Bronchiectasis/ COPD exacerbation: poor control in past. States she has been taking her treatments since her last pulmonary visit.    CXR without new consolidation. Afebrile with normal WBC.    In past- colonized with psuedomonas and S. maltophilia-    Continue levaquin- most recent pseudomonas is sensitive  Okay to add Bactrim - one prior S maltophilia was resistant to levaquin    Repeat sputum culture testing    2) AFB- one positive AFB cx in past for BRENTON  Repeat sputum AFB- no need to isolate  Smears from sputum are negatie for afb times three    3) Pseudomonas culture: sensitive to fluroquinone  Continue levaquin

## 2019-02-07 NOTE — PROGRESS NOTE ADULT - ASSESSMENT
63 year old female, with past history significant for HTN, Type-II DM, Bronchiectasis (cystic) with a Primary Ciliary Dyskinesia gene variant, ?Allergic bronchopulmonary Aspergillosis, Stenotrophomonas colonization and Chronic cough, presented to the ED secondary to worsening shortness of breath. Unclear if bronchiectasis exacerbation but would treat like one. Patient also had a previous AFB culture, which was positive, but not TB. Unclear, and will likely need repeat cultures and then consider treatment of NTM if positive.    Recommendations:  - Check CT Sinuses given persistent sinus issues and PCD variant diagnosis.   - Levaquin and Bactrim.   - Follow repeat sputum cultures.   - Repeat sputum AFB for NTM/BRENTON. Follow up  - Continue patient on airway clearance with chest percussion vest as well as flutter valve/aerobika.  - Patient unable to tolerate hypersal. Would use bronchodilators prior to percussion therapy.  - Flonase for post nasal drip.   - Encourage ambulation.  - Discharge planning in the next 1-2 days.     Jad Gaviria MD  Fellow (PGY 6),  Pulmonary & Critical Care Medicine.  Pager - [phone] ([facility])/ [phone] ([place])

## 2019-02-07 NOTE — PROGRESS NOTE ADULT - ASSESSMENT
· Assessment	  63 year old female, with past history significant for HTN, Type-II DM, Bronchiectasis (cystic), Allergic bronchopulmonary Aspergillosis, Stenotrophomonas colonization and Chronic cough, presented to the ED secondary to worsening shortness of breath.  Vital signs upon ED presentation as follows: BP = 129/73, HR = 95, RR = 16, T = 37 C (98.6 F), O2 Sat = 100% on RA.  Diagnosed with Bronchiectasis, Dyspnea on Exertion, Cough and Shortness of Breath.  Admitted for further management of:     Problem/Plan - 1:  ·  Problem: Bronchiectasis with acute exacerbation.  Plan: - Sputum: Pseudomonas/IV Levaquin  -  Pul/ID f/up noted.  Neb/O2        Problem/Plan - 3:  ·  Problem: PEMBERTON (dyspnea on exertion).  Plan: - respiratory difficulties even with minimum exertion  - therapies as above  - supplemental oxygen as/if needed (not using presently).      Problem/Plan - 5:  ·  Problem: Type 2 diabetes mellitus with hyperglycemia, with long-term current use of insulin.     FSSS/Levaquin/Duoneb  -

## 2019-02-08 LAB
-  AMIKACIN: SIGNIFICANT CHANGE UP
-  AZTREONAM: SIGNIFICANT CHANGE UP
-  CEFEPIME: SIGNIFICANT CHANGE UP
-  CEFTAZIDIME: SIGNIFICANT CHANGE UP
-  CIPROFLOXACIN: SIGNIFICANT CHANGE UP
-  GENTAMICIN: SIGNIFICANT CHANGE UP
-  IMIPENEM: SIGNIFICANT CHANGE UP
-  LEVOFLOXACIN: SIGNIFICANT CHANGE UP
-  MEROPENEM: SIGNIFICANT CHANGE UP
-  PIPERACILLIN/TAZOBACTAM: SIGNIFICANT CHANGE UP
-  TOBRAMYCIN: SIGNIFICANT CHANGE UP
ALBUMIN SERPL ELPH-MCNC: 3.5 G/DL — SIGNIFICANT CHANGE UP (ref 3.3–5)
ALP SERPL-CCNC: 130 U/L — HIGH (ref 40–120)
ALT FLD-CCNC: 32 U/L — SIGNIFICANT CHANGE UP (ref 4–33)
ANION GAP SERPL CALC-SCNC: 14 MMO/L — SIGNIFICANT CHANGE UP (ref 7–14)
AST SERPL-CCNC: 24 U/L — SIGNIFICANT CHANGE UP (ref 4–32)
BACTERIA BLD CULT: SIGNIFICANT CHANGE UP
BACTERIA BLD CULT: SIGNIFICANT CHANGE UP
BACTERIA SPT RESP CULT: SIGNIFICANT CHANGE UP
BILIRUB SERPL-MCNC: < 0.2 MG/DL — LOW (ref 0.2–1.2)
BUN SERPL-MCNC: 23 MG/DL — SIGNIFICANT CHANGE UP (ref 7–23)
CALCIUM SERPL-MCNC: 9.1 MG/DL — SIGNIFICANT CHANGE UP (ref 8.4–10.5)
CHLORIDE SERPL-SCNC: 102 MMOL/L — SIGNIFICANT CHANGE UP (ref 98–107)
CO2 SERPL-SCNC: 22 MMOL/L — SIGNIFICANT CHANGE UP (ref 22–31)
CREAT SERPL-MCNC: 0.84 MG/DL — SIGNIFICANT CHANGE UP (ref 0.5–1.3)
GLUCOSE BLDC GLUCOMTR-MCNC: 135 MG/DL — HIGH (ref 70–99)
GLUCOSE BLDC GLUCOMTR-MCNC: 144 MG/DL — HIGH (ref 70–99)
GLUCOSE BLDC GLUCOMTR-MCNC: 186 MG/DL — HIGH (ref 70–99)
GLUCOSE BLDC GLUCOMTR-MCNC: 264 MG/DL — HIGH (ref 70–99)
GLUCOSE SERPL-MCNC: 84 MG/DL — SIGNIFICANT CHANGE UP (ref 70–99)
GRAM STN SPT: SIGNIFICANT CHANGE UP
HCT VFR BLD CALC: 32.8 % — LOW (ref 34.5–45)
HGB BLD-MCNC: 10.1 G/DL — LOW (ref 11.5–15.5)
MAGNESIUM SERPL-MCNC: 1.9 MG/DL — SIGNIFICANT CHANGE UP (ref 1.6–2.6)
MCHC RBC-ENTMCNC: 21.8 PG — LOW (ref 27–34)
MCHC RBC-ENTMCNC: 30.8 % — LOW (ref 32–36)
MCV RBC AUTO: 70.7 FL — LOW (ref 80–100)
METHOD TYPE: SIGNIFICANT CHANGE UP
NRBC # FLD: 0 K/UL — LOW (ref 25–125)
ORGANISM # SPEC MICROSCOPIC CNT: SIGNIFICANT CHANGE UP
ORGANISM # SPEC MICROSCOPIC CNT: SIGNIFICANT CHANGE UP
PHOSPHATE SERPL-MCNC: 3.2 MG/DL — SIGNIFICANT CHANGE UP (ref 2.5–4.5)
PLATELET # BLD AUTO: 244 K/UL — SIGNIFICANT CHANGE UP (ref 150–400)
PMV BLD: 11.1 FL — SIGNIFICANT CHANGE UP (ref 7–13)
POTASSIUM SERPL-MCNC: 4.3 MMOL/L — SIGNIFICANT CHANGE UP (ref 3.5–5.3)
POTASSIUM SERPL-SCNC: 4.3 MMOL/L — SIGNIFICANT CHANGE UP (ref 3.5–5.3)
PROT SERPL-MCNC: 6.7 G/DL — SIGNIFICANT CHANGE UP (ref 6–8.3)
RBC # BLD: 4.64 M/UL — SIGNIFICANT CHANGE UP (ref 3.8–5.2)
RBC # FLD: 17.2 % — HIGH (ref 10.3–14.5)
SODIUM SERPL-SCNC: 138 MMOL/L — SIGNIFICANT CHANGE UP (ref 135–145)
WBC # BLD: 9.19 K/UL — SIGNIFICANT CHANGE UP (ref 3.8–10.5)
WBC # FLD AUTO: 9.19 K/UL — SIGNIFICANT CHANGE UP (ref 3.8–10.5)

## 2019-02-08 PROCEDURE — 99232 SBSQ HOSP IP/OBS MODERATE 35: CPT

## 2019-02-08 PROCEDURE — 99233 SBSQ HOSP IP/OBS HIGH 50: CPT | Mod: GC

## 2019-02-08 RX ORDER — PIPERACILLIN AND TAZOBACTAM 4; .5 G/20ML; G/20ML
3.38 INJECTION, POWDER, LYOPHILIZED, FOR SOLUTION INTRAVENOUS ONCE
Qty: 0 | Refills: 0 | Status: COMPLETED | OUTPATIENT
Start: 2019-02-08 | End: 2019-02-08

## 2019-02-08 RX ORDER — PIPERACILLIN AND TAZOBACTAM 4; .5 G/20ML; G/20ML
3.38 INJECTION, POWDER, LYOPHILIZED, FOR SOLUTION INTRAVENOUS EVERY 8 HOURS
Qty: 0 | Refills: 0 | Status: DISCONTINUED | OUTPATIENT
Start: 2019-02-08 | End: 2019-02-13

## 2019-02-08 RX ADMIN — LOSARTAN POTASSIUM 50 MILLIGRAM(S): 100 TABLET, FILM COATED ORAL at 06:15

## 2019-02-08 RX ADMIN — PANTOPRAZOLE SODIUM 40 MILLIGRAM(S): 20 TABLET, DELAYED RELEASE ORAL at 06:15

## 2019-02-08 RX ADMIN — Medication 3 MILLILITER(S): at 03:34

## 2019-02-08 RX ADMIN — Medication 2 TABLET(S): at 06:15

## 2019-02-08 RX ADMIN — Medication 3 MILLILITER(S): at 21:23

## 2019-02-08 RX ADMIN — PIPERACILLIN AND TAZOBACTAM 200 GRAM(S): 4; .5 INJECTION, POWDER, LYOPHILIZED, FOR SOLUTION INTRAVENOUS at 17:46

## 2019-02-08 RX ADMIN — Medication 100 MILLIGRAM(S): at 17:49

## 2019-02-08 RX ADMIN — Medication 1 SPRAY(S): at 06:15

## 2019-02-08 RX ADMIN — Medication 1 SPRAY(S): at 17:46

## 2019-02-08 RX ADMIN — PREGABALIN 1000 MICROGRAM(S): 225 CAPSULE ORAL at 17:51

## 2019-02-08 RX ADMIN — Medication 2: at 17:45

## 2019-02-08 RX ADMIN — Medication 3 MILLILITER(S): at 15:57

## 2019-02-08 RX ADMIN — PIPERACILLIN AND TAZOBACTAM 25 GRAM(S): 4; .5 INJECTION, POWDER, LYOPHILIZED, FOR SOLUTION INTRAVENOUS at 22:00

## 2019-02-08 RX ADMIN — Medication 3 MILLILITER(S): at 10:55

## 2019-02-08 RX ADMIN — Medication 1 TABLET(S): at 17:50

## 2019-02-08 RX ADMIN — SENNA PLUS 1 TABLET(S): 8.6 TABLET ORAL at 17:51

## 2019-02-08 RX ADMIN — Medication 100 MILLIGRAM(S): at 06:15

## 2019-02-08 RX ADMIN — Medication 1 MILLIGRAM(S): at 17:50

## 2019-02-08 RX ADMIN — Medication 1 TABLET(S): at 06:15

## 2019-02-08 RX ADMIN — Medication 6: at 08:23

## 2019-02-08 RX ADMIN — INSULIN GLARGINE 20 UNIT(S): 100 INJECTION, SOLUTION SUBCUTANEOUS at 22:00

## 2019-02-08 RX ADMIN — SENNA PLUS 1 TABLET(S): 8.6 TABLET ORAL at 22:11

## 2019-02-08 RX ADMIN — MONTELUKAST 10 MILLIGRAM(S): 4 TABLET, CHEWABLE ORAL at 17:50

## 2019-02-08 RX ADMIN — Medication 2 TABLET(S): at 17:49

## 2019-02-08 RX ADMIN — ENOXAPARIN SODIUM 40 MILLIGRAM(S): 100 INJECTION SUBCUTANEOUS at 06:15

## 2019-02-08 NOTE — PROGRESS NOTE ADULT - ASSESSMENT
· Assessment	  63 year old female, with past history significant for HTN, Type-II DM, Bronchiectasis (cystic), Allergic bronchopulmonary Aspergillosis, Stenotrophomonas colonization and Chronic cough, presented to the ED secondary to worsening shortness of breath.  Vital signs upon ED presentation as follows: BP = 129/73, HR = 95, RR = 16, T = 37 C (98.6 F), O2 Sat = 100% on RA.  Diagnosed with Bronchiectasis, Dyspnea on Exertion, Cough and Shortness of Breath.  Admitted for further management of:     Problem/Plan - 1:  ·  Problem: Bronchiectasis with acute exacerbation.  Plan: - Sputum: Pseudomonas/IV Zosyn  -  Pul/ID f/up noted.  Neb/O2        Problem/Plan - 5:  ·  Problem: Type 2 diabetes mellitus with hyperglycemia, with long-term current use of insulin.     FSSS/Lantus  -

## 2019-02-08 NOTE — PROGRESS NOTE ADULT - ATTENDING COMMENTS
Has persistent wheezing.  Start prednisone as above.  Follow up cultures.  Switch Levaquin to Zosyn and pseudomonas is resistant.

## 2019-02-08 NOTE — PROGRESS NOTE ADULT - ASSESSMENT
63 year old with poorly controlled DM, COPD, and bronchiectasis presents with acute on chronic shortness of breath with cough    1) Bronchiectasis/ COPD exacerbation: poor control in past. States she has been taking her treatments since her last pulmonary visit.    CXR without new consolidation. Afebrile with normal WBC.    In past- colonized with psuedomonas and S. maltophilia-    Continue levaquin- most recent pseudomonas is sensitive  Okay to add Bactrim - one prior S maltophilia was resistant to levaquin    Repeat sputum culture with rare pseudomonas - resistant to fluroquinolone  this may represent colonization.    Can change ot zosyn for now    I am not convinced a long course of abx will benefit pt.    2) AFB- one positive AFB cx in past for BRENTON  Repeat sputum AFB- no need to isolate  Smears from sputum are negatie for afb times three    3) Pseudomonas culture: changed to zosyn 63 year old with poorly controlled DM, COPD, and bronchiectasis presents with acute on chronic shortness of breath with cough    1) Bronchiectasis/ COPD exacerbation: poor control in past. States she has been taking her treatments since her last pulmonary visit.    CXR without new consolidation. Afebrile with normal WBC.    In past- colonized with psuedomonas and S. maltophilia-    Continue levaquin- most recent pseudomonas is sensitive  Okay to add Bactrim - one prior S maltophilia was resistant to levaquin    Repeat sputum culture with rare pseudomonas - resistant to fluroquinolone  this may represent colonization.    Can change ot zosyn for now. Consider trial of zosyn for 72 hours- if not signficant change with zosyn, d/c abx on monday.     I am not convinced a long course of abx will benefit pt.    2) AFB- one positive AFB cx in past for BRENTON  Repeat sputum AFB- no need to isolate  Smears from sputum are negatie for afb times three    3) Pseudomonas culture: changed to zosyn

## 2019-02-08 NOTE — PROGRESS NOTE ADULT - ASSESSMENT
63 year old female, with past history significant for HTN, Type-II DM, Bronchiectasis (cystic) with a Primary Ciliary Dyskinesia gene variant, ?Allergic bronchopulmonary Aspergillosis, Stenotrophomonas colonization and Chronic cough, presented to the ED secondary to worsening shortness of breath. Unclear if bronchiectasis exacerbation but would treat like one. Patient also had a previous AFB culture, which was positive, but not TB. Unclear, and will likely need repeat cultures and then consider treatment of NTM if positive.    Recommendations:  - Check CT Sinuses given persistent sinus issues and PCD variant diagnosis.   - Zosyn and Bactrim.   - Start prednisone 40mg X 5 days for persistent wheezing  - Repeat sputum AFB for NTM/BRENTON. Follow up  - Continue patient on airway clearance with chest percussion vest as well as flutter valve/aerobika.  - Patient unable to tolerate hypersal. Would use bronchodilators prior to percussion therapy.  - Flonase for post nasal drip.   - Encourage ambulation.  - Will arrange for chest vest at home.     Jad Gaviria MD  Fellow (PGY 6),  Pulmonary & Critical Care Medicine.  Pager - [phone] ([facility])/ [phone] ([place])

## 2019-02-09 LAB
GLUCOSE BLDC GLUCOMTR-MCNC: 116 MG/DL — HIGH (ref 70–99)
GLUCOSE BLDC GLUCOMTR-MCNC: 201 MG/DL — HIGH (ref 70–99)
GLUCOSE BLDC GLUCOMTR-MCNC: 202 MG/DL — HIGH (ref 70–99)
GLUCOSE BLDC GLUCOMTR-MCNC: 206 MG/DL — HIGH (ref 70–99)
GRAM STN SPT: SIGNIFICANT CHANGE UP
SPECIMEN SOURCE: SIGNIFICANT CHANGE UP

## 2019-02-09 PROCEDURE — 70486 CT MAXILLOFACIAL W/O DYE: CPT | Mod: 26

## 2019-02-09 RX ADMIN — SENNA PLUS 1 TABLET(S): 8.6 TABLET ORAL at 13:02

## 2019-02-09 RX ADMIN — Medication 1 MILLIGRAM(S): at 13:01

## 2019-02-09 RX ADMIN — SENNA PLUS 1 TABLET(S): 8.6 TABLET ORAL at 22:21

## 2019-02-09 RX ADMIN — ENOXAPARIN SODIUM 40 MILLIGRAM(S): 100 INJECTION SUBCUTANEOUS at 06:22

## 2019-02-09 RX ADMIN — PIPERACILLIN AND TAZOBACTAM 25 GRAM(S): 4; .5 INJECTION, POWDER, LYOPHILIZED, FOR SOLUTION INTRAVENOUS at 06:22

## 2019-02-09 RX ADMIN — Medication 100 MILLIGRAM(S): at 06:23

## 2019-02-09 RX ADMIN — Medication 1 TABLET(S): at 17:31

## 2019-02-09 RX ADMIN — LOSARTAN POTASSIUM 50 MILLIGRAM(S): 100 TABLET, FILM COATED ORAL at 06:22

## 2019-02-09 RX ADMIN — Medication 650 MILLIGRAM(S): at 22:34

## 2019-02-09 RX ADMIN — Medication 2 TABLET(S): at 06:22

## 2019-02-09 RX ADMIN — Medication 200 MILLIGRAM(S): at 22:35

## 2019-02-09 RX ADMIN — PIPERACILLIN AND TAZOBACTAM 25 GRAM(S): 4; .5 INJECTION, POWDER, LYOPHILIZED, FOR SOLUTION INTRAVENOUS at 22:22

## 2019-02-09 RX ADMIN — PREGABALIN 1000 MICROGRAM(S): 225 CAPSULE ORAL at 14:19

## 2019-02-09 RX ADMIN — Medication 40 MILLIGRAM(S): at 13:05

## 2019-02-09 RX ADMIN — Medication 650 MILLIGRAM(S): at 23:34

## 2019-02-09 RX ADMIN — Medication 1 TABLET(S): at 06:23

## 2019-02-09 RX ADMIN — Medication 1 SPRAY(S): at 17:30

## 2019-02-09 RX ADMIN — INSULIN GLARGINE 20 UNIT(S): 100 INJECTION, SOLUTION SUBCUTANEOUS at 22:21

## 2019-02-09 RX ADMIN — Medication 3 MILLILITER(S): at 10:22

## 2019-02-09 RX ADMIN — Medication 2 TABLET(S): at 17:31

## 2019-02-09 RX ADMIN — Medication 4: at 17:30

## 2019-02-09 RX ADMIN — Medication 1 TABLET(S): at 13:02

## 2019-02-09 RX ADMIN — Medication 3 MILLILITER(S): at 03:05

## 2019-02-09 RX ADMIN — Medication 1 SPRAY(S): at 06:29

## 2019-02-09 RX ADMIN — Medication 3 MILLILITER(S): at 23:05

## 2019-02-09 RX ADMIN — PIPERACILLIN AND TAZOBACTAM 25 GRAM(S): 4; .5 INJECTION, POWDER, LYOPHILIZED, FOR SOLUTION INTRAVENOUS at 14:20

## 2019-02-09 RX ADMIN — Medication 100 MILLIGRAM(S): at 13:01

## 2019-02-09 RX ADMIN — MONTELUKAST 10 MILLIGRAM(S): 4 TABLET, CHEWABLE ORAL at 14:20

## 2019-02-09 RX ADMIN — Medication 4: at 12:58

## 2019-02-09 RX ADMIN — PANTOPRAZOLE SODIUM 40 MILLIGRAM(S): 20 TABLET, DELAYED RELEASE ORAL at 06:23

## 2019-02-09 RX ADMIN — Medication 3 MILLILITER(S): at 15:24

## 2019-02-10 LAB
ALBUMIN SERPL ELPH-MCNC: 3.6 G/DL — SIGNIFICANT CHANGE UP (ref 3.3–5)
ALP SERPL-CCNC: 158 U/L — HIGH (ref 40–120)
ALT FLD-CCNC: 50 U/L — HIGH (ref 4–33)
ANION GAP SERPL CALC-SCNC: 12 MMO/L — SIGNIFICANT CHANGE UP (ref 7–14)
AST SERPL-CCNC: 51 U/L — HIGH (ref 4–32)
BILIRUB SERPL-MCNC: < 0.2 MG/DL — LOW (ref 0.2–1.2)
BUN SERPL-MCNC: 31 MG/DL — HIGH (ref 7–23)
CALCIUM SERPL-MCNC: 9.1 MG/DL — SIGNIFICANT CHANGE UP (ref 8.4–10.5)
CHLORIDE SERPL-SCNC: 99 MMOL/L — SIGNIFICANT CHANGE UP (ref 98–107)
CO2 SERPL-SCNC: 22 MMOL/L — SIGNIFICANT CHANGE UP (ref 22–31)
CREAT SERPL-MCNC: 0.9 MG/DL — SIGNIFICANT CHANGE UP (ref 0.5–1.3)
GLUCOSE BLDC GLUCOMTR-MCNC: 162 MG/DL — HIGH (ref 70–99)
GLUCOSE BLDC GLUCOMTR-MCNC: 178 MG/DL — HIGH (ref 70–99)
GLUCOSE BLDC GLUCOMTR-MCNC: 188 MG/DL — HIGH (ref 70–99)
GLUCOSE BLDC GLUCOMTR-MCNC: 217 MG/DL — HIGH (ref 70–99)
GLUCOSE SERPL-MCNC: 267 MG/DL — HIGH (ref 70–99)
HCT VFR BLD CALC: 33.9 % — LOW (ref 34.5–45)
HGB BLD-MCNC: 10.1 G/DL — LOW (ref 11.5–15.5)
MAGNESIUM SERPL-MCNC: 2 MG/DL — SIGNIFICANT CHANGE UP (ref 1.6–2.6)
MCHC RBC-ENTMCNC: 21.5 PG — LOW (ref 27–34)
MCHC RBC-ENTMCNC: 29.8 % — LOW (ref 32–36)
MCV RBC AUTO: 72.1 FL — LOW (ref 80–100)
NRBC # FLD: 0 K/UL — LOW (ref 25–125)
PHOSPHATE SERPL-MCNC: 3.9 MG/DL — SIGNIFICANT CHANGE UP (ref 2.5–4.5)
PLATELET # BLD AUTO: 206 K/UL — SIGNIFICANT CHANGE UP (ref 150–400)
PMV BLD: 10.3 FL — SIGNIFICANT CHANGE UP (ref 7–13)
POTASSIUM SERPL-MCNC: 4.6 MMOL/L — SIGNIFICANT CHANGE UP (ref 3.5–5.3)
POTASSIUM SERPL-SCNC: 4.6 MMOL/L — SIGNIFICANT CHANGE UP (ref 3.5–5.3)
PROT SERPL-MCNC: 6.8 G/DL — SIGNIFICANT CHANGE UP (ref 6–8.3)
RBC # BLD: 4.7 M/UL — SIGNIFICANT CHANGE UP (ref 3.8–5.2)
RBC # FLD: 17.1 % — HIGH (ref 10.3–14.5)
SODIUM SERPL-SCNC: 133 MMOL/L — LOW (ref 135–145)
WBC # BLD: 7.45 K/UL — SIGNIFICANT CHANGE UP (ref 3.8–10.5)
WBC # FLD AUTO: 7.45 K/UL — SIGNIFICANT CHANGE UP (ref 3.8–10.5)

## 2019-02-10 RX ORDER — LANOLIN ALCOHOL/MO/W.PET/CERES
6 CREAM (GRAM) TOPICAL AT BEDTIME
Qty: 0 | Refills: 0 | Status: DISCONTINUED | OUTPATIENT
Start: 2019-02-10 | End: 2019-02-13

## 2019-02-10 RX ADMIN — Medication 2: at 12:36

## 2019-02-10 RX ADMIN — LOSARTAN POTASSIUM 50 MILLIGRAM(S): 100 TABLET, FILM COATED ORAL at 06:39

## 2019-02-10 RX ADMIN — PREGABALIN 1000 MICROGRAM(S): 225 CAPSULE ORAL at 13:13

## 2019-02-10 RX ADMIN — Medication 1 TABLET(S): at 17:10

## 2019-02-10 RX ADMIN — Medication 1 TABLET(S): at 06:38

## 2019-02-10 RX ADMIN — Medication 2 TABLET(S): at 17:10

## 2019-02-10 RX ADMIN — SENNA PLUS 1 TABLET(S): 8.6 TABLET ORAL at 13:13

## 2019-02-10 RX ADMIN — ENOXAPARIN SODIUM 40 MILLIGRAM(S): 100 INJECTION SUBCUTANEOUS at 06:34

## 2019-02-10 RX ADMIN — Medication 100 MILLIGRAM(S): at 13:13

## 2019-02-10 RX ADMIN — PIPERACILLIN AND TAZOBACTAM 25 GRAM(S): 4; .5 INJECTION, POWDER, LYOPHILIZED, FOR SOLUTION INTRAVENOUS at 06:35

## 2019-02-10 RX ADMIN — Medication 2 TABLET(S): at 06:38

## 2019-02-10 RX ADMIN — Medication 1 TABLET(S): at 13:13

## 2019-02-10 RX ADMIN — PIPERACILLIN AND TAZOBACTAM 25 GRAM(S): 4; .5 INJECTION, POWDER, LYOPHILIZED, FOR SOLUTION INTRAVENOUS at 13:12

## 2019-02-10 RX ADMIN — Medication 40 MILLIGRAM(S): at 06:39

## 2019-02-10 RX ADMIN — Medication 1 MILLIGRAM(S): at 13:13

## 2019-02-10 RX ADMIN — Medication 4: at 17:10

## 2019-02-10 RX ADMIN — MONTELUKAST 10 MILLIGRAM(S): 4 TABLET, CHEWABLE ORAL at 13:13

## 2019-02-10 RX ADMIN — PANTOPRAZOLE SODIUM 40 MILLIGRAM(S): 20 TABLET, DELAYED RELEASE ORAL at 06:40

## 2019-02-10 RX ADMIN — AMLODIPINE BESYLATE 5 MILLIGRAM(S): 2.5 TABLET ORAL at 06:38

## 2019-02-10 RX ADMIN — Medication 6 MILLIGRAM(S): at 01:51

## 2019-02-10 RX ADMIN — SENNA PLUS 1 TABLET(S): 8.6 TABLET ORAL at 21:39

## 2019-02-10 RX ADMIN — PIPERACILLIN AND TAZOBACTAM 25 GRAM(S): 4; .5 INJECTION, POWDER, LYOPHILIZED, FOR SOLUTION INTRAVENOUS at 21:37

## 2019-02-10 RX ADMIN — Medication 1 SPRAY(S): at 17:11

## 2019-02-10 RX ADMIN — Medication 1 SPRAY(S): at 06:37

## 2019-02-10 RX ADMIN — Medication 3 MILLILITER(S): at 21:57

## 2019-02-10 RX ADMIN — Medication 3 MILLILITER(S): at 10:05

## 2019-02-10 RX ADMIN — INSULIN GLARGINE 20 UNIT(S): 100 INJECTION, SOLUTION SUBCUTANEOUS at 21:38

## 2019-02-10 RX ADMIN — Medication 2: at 08:25

## 2019-02-10 RX ADMIN — Medication 3 MILLILITER(S): at 15:30

## 2019-02-10 NOTE — PROGRESS NOTE ADULT - ASSESSMENT
· Assessment	  63 year old female, with past history significant for HTN, Type-II DM, Bronchiectasis (cystic), Allergic bronchopulmonary Aspergillosis, Stenotrophomonas colonization and Chronic cough, presented to the ED secondary to worsening shortness of breath.  Vital signs upon ED presentation as follows: BP = 129/73, HR = 95, RR = 16, T = 37 C (98.6 F), O2 Sat = 100% on RA.  Diagnosed with Bronchiectasis, Dyspnea on Exertion, Cough and Shortness of Breath.  Admitted for further management of:     Problem/Plan - 1:  ·  Problem: Bronchiectasis with acute exacerbation.  Plan: - Sputum: Pseudomonas/IV Zosyn  -  Pul/ID f/up noted.  Neb/O2        Problem/Plan - 5:  ·  Problem: Type 2 diabetes mellitus with hyperglycemia, with long-term current use of insulin.     FSSS/Lantus  -   Fitz family.

## 2019-02-11 LAB
ALBUMIN SERPL ELPH-MCNC: 3.6 G/DL — SIGNIFICANT CHANGE UP (ref 3.3–5)
ALP SERPL-CCNC: 139 U/L — HIGH (ref 40–120)
ALT FLD-CCNC: 66 U/L — HIGH (ref 4–33)
ANION GAP SERPL CALC-SCNC: 10 MMO/L — SIGNIFICANT CHANGE UP (ref 7–14)
AST SERPL-CCNC: 67 U/L — HIGH (ref 4–32)
BILIRUB SERPL-MCNC: < 0.2 MG/DL — LOW (ref 0.2–1.2)
BUN SERPL-MCNC: 23 MG/DL — SIGNIFICANT CHANGE UP (ref 7–23)
CALCIUM SERPL-MCNC: 9.3 MG/DL — SIGNIFICANT CHANGE UP (ref 8.4–10.5)
CHLORIDE SERPL-SCNC: 102 MMOL/L — SIGNIFICANT CHANGE UP (ref 98–107)
CO2 SERPL-SCNC: 24 MMOL/L — SIGNIFICANT CHANGE UP (ref 22–31)
CREAT SERPL-MCNC: 0.93 MG/DL — SIGNIFICANT CHANGE UP (ref 0.5–1.3)
GLUCOSE BLDC GLUCOMTR-MCNC: 111 MG/DL — HIGH (ref 70–99)
GLUCOSE BLDC GLUCOMTR-MCNC: 240 MG/DL — HIGH (ref 70–99)
GLUCOSE BLDC GLUCOMTR-MCNC: 75 MG/DL — SIGNIFICANT CHANGE UP (ref 70–99)
GLUCOSE SERPL-MCNC: 66 MG/DL — LOW (ref 70–99)
HCT VFR BLD CALC: 33.9 % — LOW (ref 34.5–45)
HGB BLD-MCNC: 10.3 G/DL — LOW (ref 11.5–15.5)
MAGNESIUM SERPL-MCNC: 2 MG/DL — SIGNIFICANT CHANGE UP (ref 1.6–2.6)
MCHC RBC-ENTMCNC: 21.5 PG — LOW (ref 27–34)
MCHC RBC-ENTMCNC: 30.4 % — LOW (ref 32–36)
MCV RBC AUTO: 70.8 FL — LOW (ref 80–100)
NRBC # FLD: 0 K/UL — LOW (ref 25–125)
PHOSPHATE SERPL-MCNC: 3.1 MG/DL — SIGNIFICANT CHANGE UP (ref 2.5–4.5)
PLATELET # BLD AUTO: 213 K/UL — SIGNIFICANT CHANGE UP (ref 150–400)
PMV BLD: 10.8 FL — SIGNIFICANT CHANGE UP (ref 7–13)
POTASSIUM SERPL-MCNC: 4.5 MMOL/L — SIGNIFICANT CHANGE UP (ref 3.5–5.3)
POTASSIUM SERPL-SCNC: 4.5 MMOL/L — SIGNIFICANT CHANGE UP (ref 3.5–5.3)
PROT SERPL-MCNC: 7 G/DL — SIGNIFICANT CHANGE UP (ref 6–8.3)
RBC # BLD: 4.79 M/UL — SIGNIFICANT CHANGE UP (ref 3.8–5.2)
RBC # FLD: 17.4 % — HIGH (ref 10.3–14.5)
SODIUM SERPL-SCNC: 136 MMOL/L — SIGNIFICANT CHANGE UP (ref 135–145)
WBC # BLD: 8.6 K/UL — SIGNIFICANT CHANGE UP (ref 3.8–10.5)
WBC # FLD AUTO: 8.6 K/UL — SIGNIFICANT CHANGE UP (ref 3.8–10.5)

## 2019-02-11 PROCEDURE — 99232 SBSQ HOSP IP/OBS MODERATE 35: CPT | Mod: GC

## 2019-02-11 PROCEDURE — 99232 SBSQ HOSP IP/OBS MODERATE 35: CPT

## 2019-02-11 RX ADMIN — MONTELUKAST 10 MILLIGRAM(S): 4 TABLET, CHEWABLE ORAL at 12:43

## 2019-02-11 RX ADMIN — ENOXAPARIN SODIUM 40 MILLIGRAM(S): 100 INJECTION SUBCUTANEOUS at 07:02

## 2019-02-11 RX ADMIN — PIPERACILLIN AND TAZOBACTAM 25 GRAM(S): 4; .5 INJECTION, POWDER, LYOPHILIZED, FOR SOLUTION INTRAVENOUS at 21:26

## 2019-02-11 RX ADMIN — SENNA PLUS 1 TABLET(S): 8.6 TABLET ORAL at 12:43

## 2019-02-11 RX ADMIN — SENNA PLUS 1 TABLET(S): 8.6 TABLET ORAL at 21:26

## 2019-02-11 RX ADMIN — Medication 100 MILLIGRAM(S): at 12:43

## 2019-02-11 RX ADMIN — Medication 3 MILLILITER(S): at 09:55

## 2019-02-11 RX ADMIN — Medication 200 MILLIGRAM(S): at 21:26

## 2019-02-11 RX ADMIN — Medication 1 SPRAY(S): at 07:02

## 2019-02-11 RX ADMIN — Medication 1 TABLET(S): at 07:02

## 2019-02-11 RX ADMIN — Medication 40 MILLIGRAM(S): at 07:02

## 2019-02-11 RX ADMIN — Medication 1 TABLET(S): at 12:43

## 2019-02-11 RX ADMIN — Medication 3 MILLILITER(S): at 04:05

## 2019-02-11 RX ADMIN — Medication 1 MILLIGRAM(S): at 12:43

## 2019-02-11 RX ADMIN — Medication 3 MILLILITER(S): at 23:07

## 2019-02-11 RX ADMIN — PIPERACILLIN AND TAZOBACTAM 25 GRAM(S): 4; .5 INJECTION, POWDER, LYOPHILIZED, FOR SOLUTION INTRAVENOUS at 13:46

## 2019-02-11 RX ADMIN — Medication 4: at 12:42

## 2019-02-11 RX ADMIN — PANTOPRAZOLE SODIUM 40 MILLIGRAM(S): 20 TABLET, DELAYED RELEASE ORAL at 07:02

## 2019-02-11 RX ADMIN — PREGABALIN 1000 MICROGRAM(S): 225 CAPSULE ORAL at 12:43

## 2019-02-11 RX ADMIN — Medication 100 MILLIGRAM(S): at 07:02

## 2019-02-11 RX ADMIN — PIPERACILLIN AND TAZOBACTAM 25 GRAM(S): 4; .5 INJECTION, POWDER, LYOPHILIZED, FOR SOLUTION INTRAVENOUS at 07:01

## 2019-02-11 RX ADMIN — Medication 2 TABLET(S): at 07:03

## 2019-02-11 RX ADMIN — INSULIN GLARGINE 20 UNIT(S): 100 INJECTION, SOLUTION SUBCUTANEOUS at 21:26

## 2019-02-11 NOTE — PROGRESS NOTE ADULT - ATTENDING COMMENTS
63 F with history of HTN, DM2, bronchiectasis (cystic) with a primary ciliary dyskinesia gene variant, allergic bronchopulmonary aspergillosis, stenotrophomonas colonization and chronic cough, presented with worsening shortness of breath. Patient also had a previous AFB culture, which was positive, but not TB. CT sinuses with significant disease. CXR grossly unchanged from prior exam. She is afebrile and has no leukocytosis. AFB have been negative x 3 on this admission.    On Zosyn for pseudomonas and Bactrim for S maltophilia  ID following - recommend considering to d/c zosyn after tomorrow which is reasonable  On 5 day course of prednisone for wheezing which has improved  Airway clearance - will need to approve her for chest vest   Continue flutter valve/aerobika device  Flonase for post nasal drip  Bronchodilators  OOB to chair, ambulation

## 2019-02-11 NOTE — PROGRESS NOTE ADULT - ASSESSMENT
63 year old with poorly controlled DM, COPD, and bronchiectasis presents with acute on chronic shortness of breath with cough    1) Bronchiectasis/ COPD exacerbation: poor control in past. States she has been taking her treatments since her last pulmonary visit.    CXR without new consolidation. Afebrile with normal WBC.    In past- colonized with psuedomonas and S. maltophilia-    Continue levaquin- most recent pseudomonas is sensitive  Okay to add Bactrim - one prior S maltophilia was resistant to levaquin    Repeat sputum culture with rare pseudomonas - resistant to fluroquinolone  this may represent colonization.    Day 3 of zosyn without much improvement.  Doubt her cough/ respriatory symptoms are driven by a bacterial process.  Pseudomonas may be a colonizer.  Consider stopping zosyn on 2/13    I am not convinced a long course of abx will benefit pt.    2) AFB- one positive AFB cx in past for BR[place]  Repeat sputum AFB- no need to isolate  Smears from sputum are negatie for afb times three    3) Pseudomonas culture: changed to zosyn

## 2019-02-12 ENCOUNTER — TRANSCRIPTION ENCOUNTER (OUTPATIENT)
Age: 64
End: 2019-02-12

## 2019-02-12 LAB
GLUCOSE BLDC GLUCOMTR-MCNC: 144 MG/DL — HIGH (ref 70–99)
GLUCOSE BLDC GLUCOMTR-MCNC: 178 MG/DL — HIGH (ref 70–99)
GLUCOSE BLDC GLUCOMTR-MCNC: 315 MG/DL — HIGH (ref 70–99)
GLUCOSE BLDC GLUCOMTR-MCNC: 386 MG/DL — HIGH (ref 70–99)
GLUCOSE BLDC GLUCOMTR-MCNC: 63 MG/DL — LOW (ref 70–99)
GLUCOSE BLDC GLUCOMTR-MCNC: 91 MG/DL — SIGNIFICANT CHANGE UP (ref 70–99)

## 2019-02-12 PROCEDURE — 99232 SBSQ HOSP IP/OBS MODERATE 35: CPT

## 2019-02-12 PROCEDURE — 99233 SBSQ HOSP IP/OBS HIGH 50: CPT | Mod: GC

## 2019-02-12 RX ORDER — NYSTATIN CREAM 100000 [USP'U]/G
0 CREAM TOPICAL
Qty: 0 | Refills: 0 | COMMUNITY

## 2019-02-12 RX ORDER — SENNA PLUS 8.6 MG/1
0 TABLET ORAL
Qty: 0 | Refills: 0 | COMMUNITY

## 2019-02-12 RX ORDER — DOCUSATE SODIUM 100 MG
1 CAPSULE ORAL
Qty: 0 | Refills: 0 | DISCHARGE
Start: 2019-02-12

## 2019-02-12 RX ORDER — POLYETHYLENE GLYCOL 3350 17 G/17G
17 POWDER, FOR SOLUTION ORAL
Qty: 0 | Refills: 0 | DISCHARGE
Start: 2019-02-12

## 2019-02-12 RX ORDER — MONTELUKAST 4 MG/1
1 TABLET, CHEWABLE ORAL
Qty: 0 | Refills: 0 | COMMUNITY
Start: 2019-02-12

## 2019-02-12 RX ORDER — SENNA PLUS 8.6 MG/1
1 TABLET ORAL
Qty: 0 | Refills: 0 | DISCHARGE
Start: 2019-02-12

## 2019-02-12 RX ORDER — LANOLIN ALCOHOL/MO/W.PET/CERES
1 CREAM (GRAM) TOPICAL
Qty: 0 | Refills: 0 | COMMUNITY

## 2019-02-12 RX ORDER — POLYETHYLENE GLYCOL 3350 17 G/17G
17 POWDER, FOR SOLUTION ORAL DAILY
Qty: 0 | Refills: 0 | Status: DISCONTINUED | OUTPATIENT
Start: 2019-02-12 | End: 2019-02-13

## 2019-02-12 RX ORDER — PANTOPRAZOLE SODIUM 20 MG/1
1 TABLET, DELAYED RELEASE ORAL
Qty: 0 | Refills: 0 | COMMUNITY
Start: 2019-02-12

## 2019-02-12 RX ORDER — FLUTICASONE PROPIONATE 50 MCG
1 SPRAY, SUSPENSION NASAL
Qty: 0 | Refills: 0 | COMMUNITY
Start: 2019-02-12

## 2019-02-12 RX ORDER — MONTELUKAST 4 MG/1
0 TABLET, CHEWABLE ORAL
Qty: 0 | Refills: 0 | COMMUNITY

## 2019-02-12 RX ADMIN — Medication 1 SPRAY(S): at 05:32

## 2019-02-12 RX ADMIN — LOSARTAN POTASSIUM 50 MILLIGRAM(S): 100 TABLET, FILM COATED ORAL at 05:31

## 2019-02-12 RX ADMIN — Medication 1 MILLIGRAM(S): at 13:40

## 2019-02-12 RX ADMIN — Medication 3 MILLILITER(S): at 04:27

## 2019-02-12 RX ADMIN — PIPERACILLIN AND TAZOBACTAM 25 GRAM(S): 4; .5 INJECTION, POWDER, LYOPHILIZED, FOR SOLUTION INTRAVENOUS at 21:59

## 2019-02-12 RX ADMIN — Medication 100 MILLIGRAM(S): at 13:40

## 2019-02-12 RX ADMIN — POLYETHYLENE GLYCOL 3350 17 GRAM(S): 17 POWDER, FOR SOLUTION ORAL at 13:40

## 2019-02-12 RX ADMIN — Medication 2 TABLET(S): at 17:32

## 2019-02-12 RX ADMIN — Medication 100 MILLIGRAM(S): at 05:31

## 2019-02-12 RX ADMIN — Medication 1 SPRAY(S): at 17:31

## 2019-02-12 RX ADMIN — Medication 1 TABLET(S): at 17:32

## 2019-02-12 RX ADMIN — PREGABALIN 1000 MICROGRAM(S): 225 CAPSULE ORAL at 13:40

## 2019-02-12 RX ADMIN — Medication 10: at 12:26

## 2019-02-12 RX ADMIN — Medication 3 MILLILITER(S): at 15:41

## 2019-02-12 RX ADMIN — PIPERACILLIN AND TAZOBACTAM 25 GRAM(S): 4; .5 INJECTION, POWDER, LYOPHILIZED, FOR SOLUTION INTRAVENOUS at 13:41

## 2019-02-12 RX ADMIN — Medication 1 TABLET(S): at 05:32

## 2019-02-12 RX ADMIN — ENOXAPARIN SODIUM 40 MILLIGRAM(S): 100 INJECTION SUBCUTANEOUS at 05:31

## 2019-02-12 RX ADMIN — Medication 2: at 08:28

## 2019-02-12 RX ADMIN — INSULIN GLARGINE 20 UNIT(S): 100 INJECTION, SOLUTION SUBCUTANEOUS at 22:45

## 2019-02-12 RX ADMIN — PANTOPRAZOLE SODIUM 40 MILLIGRAM(S): 20 TABLET, DELAYED RELEASE ORAL at 05:32

## 2019-02-12 RX ADMIN — Medication 3 MILLILITER(S): at 23:01

## 2019-02-12 RX ADMIN — Medication 2 TABLET(S): at 05:31

## 2019-02-12 RX ADMIN — Medication 1 TABLET(S): at 13:40

## 2019-02-12 RX ADMIN — MONTELUKAST 10 MILLIGRAM(S): 4 TABLET, CHEWABLE ORAL at 13:41

## 2019-02-12 RX ADMIN — SENNA PLUS 1 TABLET(S): 8.6 TABLET ORAL at 21:58

## 2019-02-12 RX ADMIN — Medication 200 MILLIGRAM(S): at 21:58

## 2019-02-12 RX ADMIN — Medication 40 MILLIGRAM(S): at 05:31

## 2019-02-12 RX ADMIN — Medication 3 MILLILITER(S): at 09:54

## 2019-02-12 RX ADMIN — Medication 8: at 17:30

## 2019-02-12 RX ADMIN — PIPERACILLIN AND TAZOBACTAM 25 GRAM(S): 4; .5 INJECTION, POWDER, LYOPHILIZED, FOR SOLUTION INTRAVENOUS at 05:32

## 2019-02-12 NOTE — DISCHARGE NOTE ADULT - CARE PROVIDERS DIRECT ADDRESSES
,DirectAddress_Unknown,DirectAddress_Unknown ,DirectAddress_Unknown,DirectAddress_Unknown,[email or web address]

## 2019-02-12 NOTE — PROGRESS NOTE ADULT - ASSESSMENT
63 year old with poorly controlled DM, COPD, and bronchiectasis presents with acute on chronic shortness of breath with cough    1) Bronchiectasis/ COPD exacerbation: poor control in past. States she has been taking her treatments since her last pulmonary visit.    CXR without new consolidation. Afebrile with normal WBC.    In past- colonized with psuedomonas and S. maltophilia-    Continue levaquin- most recent pseudomonas is sensitive  Okay to add Bactrim - one prior S maltophilia was resistant to levaquin    Repeat sputum culture with rare pseudomonas - resistant to fluroquinolone  this may represent colonization.    Day 4 of zosyn without much improvement.  Doubt her cough/ respriatory symptoms are driven by a bacterial process.  Pseudomonas may be a colonizer.  Consider stopping zosyn on 2/13    I am not convinced a long course of abx will benefit pt.    2) AFB- one positive AFB cx in past for [place]  Repeat sputum AFB- no need to isolate  Smears from sputum are negatie for afb times three    3) Pseudomonas culture: changed to zosyn

## 2019-02-12 NOTE — DISCHARGE NOTE ADULT - CARE PROVIDER_API CALL
Jennifer Cantu)  Emergency Medicine  [address] [place] [address]  Phone: [phone]  Fax: [phone]  Follow Up Time:     Edi Dykes)  Cardiovascular Disease; Internal Medicine  [address] [place] [address]  Phone: [phone]  Fax: [phone]  Follow Up Time: Jennifer Cantu)  Emergency Medicine  [address] [place] [address]  Phone: [phone]  Fax: [phone]  Follow Up Time:     Edi Dykes)  Cardiovascular Disease; Internal Medicine  [address] [place] [address]  Phone: [phone]  Fax: [phone]  Follow Up Time:     Shelli Young)  Critical Care Medicine; Pulmonary Disease  [address], [address]  [place] [address]  Phone: [phone]  Fax: [phone]  Follow Up Time:

## 2019-02-12 NOTE — DISCHARGE NOTE ADULT - MEDICATION SUMMARY - MEDICATIONS TO TAKE
I will START or STAY ON the medications listed below when I get home from the hospital:    predniSONE 20 mg oral tablet  -- 2 tab(s) by mouth once a day   -- It is very important that you take or use this exactly as directed.  Do not skip doses or discontinue unless directed by your doctor.  Obtain medical advice before taking any non-prescription drugs as some may affect the action of this medication.  Take with food or milk.    -- Indication: For Bronchiectasis with acute exacerbation    Bethkis 75 mg/mL inhalation solution  -- 4 milliliter(s) inhaled 2 times a day ~ started 01/15/2019  -- Indication: For Bronchiectasis with acute exacerbation    acetaminophen 325 mg oral tablet  -- 2 tab(s) by mouth every 4 hours, As Needed - 3), Moderate Pain (4 - 6)  -- Indication: For Pain    losartan 50 mg oral tablet  -- 1 tab(s) by mouth once a day  -- Indication: For HTN    metFORMIN 1000 mg oral tablet, extended release  -- 1 tab(s) by mouth 2 times a day  -- Indication: For Type 2 diabetes mellitus with hyperglycemia, with long-term current use of insulin    Basaglar KwikPen 100 units/mL subcutaneous solution  -- 15 unit(s) subcutaneous once a day (in the morning)   -- Indication: For Type 2 diabetes mellitus with hyperglycemia, with long-term current use of insulin    Ventolin HFA 90 mcg/inh inhalation aerosol  -- 2 puff(s) inhaled 4 times a day  -- Indication: For PEMBERTON (dyspnea on exertion)    Perforomist 20 mcg/2 mL inhalation solution  -- 2 milliliter(s) inhaled 2 times a day  -- Indication: For Bronchiectasis with acute exacerbation    Symbicort 160 mcg-4.5 mcg/inh inhalation aerosol  -- 2 puff(s) inhaled 2 times a day  -- Indication: For CoPD    Breo Ellipta 100 mcg-25 mcg/inh inhalation powder  -- 1 puff(s) inhaled once a day  -- Indication: For Bronchiectasis with acute exacerbation    Norvasc 5 mg oral tablet  -- 1 tab(s) by mouth once a day   -- It is very important that you take or use this exactly as directed.  Do not skip doses or discontinue unless directed by your doctor.  Some non-prescription drugs may aggravate your condition.  Read all labels carefully.  If a warning appears, check with your doctor before taking.    -- Indication: For HTN    Mucinex 600 mg oral tablet, extended release  -- 1 tab(s) by mouth every 12 hours, As Needed for cough    -- Indication: For Cough    docusate sodium 100 mg oral capsule  -- 1 cap(s) by mouth 2 times a day  -- Indication: For Stool softner    senna oral tablet  -- 1 tab(s) by mouth once a day (at bedtime)  -- Indication: For Constipation    polyethylene glycol 3350 oral powder for reconstitution  -- 17 gram(s) by mouth once a day  -- Indication: For Constipation    montelukast 10 mg oral tablet  -- 1 tab(s) by mouth once a day  -- Indication: For Bronchiectasis with acute exacerbation    Sodium Chloride, Inhalation 3% inhalation solution  -- Indication: For Mucous production    fluticasone 50 mcg/inh nasal spray  -- 1 spray(s) into nose 2 times a day  -- Indication: For Sinusitis, acute    sodium chloride 0.9% nasal spray  -- 1 spray(s) intranasally 2 times a day   -- For the nose.    -- Indication: For Sinusitis, acute    Melatonin 1 mg oral tablet  -- 3 tab(s) by mouth once a day (at bedtime)  -- Indication: For insomnia    pantoprazole 40 mg oral delayed release tablet  -- 1 tab(s) by mouth once a day (before a meal)  -- Indication: For GERD (gastroesophageal reflux disease)    Calcium 600+D oral tablet  -- 1 tab(s) by mouth 2 times a day  -- Indication: For Supplement    Daily Atul oral tablet  -- 1 tab(s) by mouth once a day  -- Indication: For Ppx    Vitamin B12 1000 mcg oral tablet  -- 1 tab(s) by mouth once a day  -- Indication: For Vitamin D deficiency    folic acid 1 mg oral tablet  -- 1 tab(s) by mouth once a day  -- Indication: For Supplement

## 2019-02-12 NOTE — PROGRESS NOTE ADULT - ATTENDING COMMENTS
63 F with history of HTN, DM2, bronchiectasis (cystic) with a primary ciliary dyskinesia gene variant, ?allergic bronchopulmonary aspergillosis, stenotrophomonas colonization and chronic cough, presented with worsening shortness of breath. Patient also had a previous AFB culture, which was positive, but not TB. CT sinuses with significant disease. CXR grossly unchanged from prior exam. She is afebrile and has no leukocytosis. AFB have been negative x 3 on this admission.    On Zosyn for pseudomonas and Bactrim for S maltophilia  On 5 day course of prednisone for wheezing which has improved  Airway clearance - will need to approve her for chest vest   Continue flutter valve/aerobika device  Flonase for post nasal drip  Bronchodilators  OOB to chair, ambulation  Significant sinus disease - should follow up with ENT as outpatient 63 F with history of HTN, DM2, bronchiectasis (cystic) with a primary ciliary dyskinesia gene variant, ?allergic bronchopulmonary aspergillosis, stenotrophomonas colonization and chronic cough, presented with worsening shortness of breath. Patient also had a previous AFB culture, which was positive, but not TB. CT sinuses with significant disease. CXR grossly unchanged from prior exam. She is afebrile and has no leukocytosis. AFB have been negative x 3 on this admission.    On Zosyn for pseudomonas and Bactrim for S maltophilia  On 5 day course of prednisone for wheezing which has improved  Airway clearance - will need to approve her for chest vest   Continue flutter valve/aerobika device  Flonase for post nasal drip  Bronchodilators  OOB to chair, ambulation  Significant sinus disease - should follow up with ENT as outpatient  Patient has severe bronchiectasis, recurrent pneumonias, chronic productive cough, has failed alternative methods or airway clearance including chest PT and aerobika device, would benefit from chest vest.

## 2019-02-12 NOTE — DISCHARGE NOTE ADULT - PROVIDER TOKENS
PROVIDER:[TOKEN:[[id number]]],PROVIDER:[TOKEN:[[id number]]] PROVIDER:[TOKEN:[[id number]]],PROVIDER:[TOKEN:[[id number]]],PROVIDER:[TOKEN:[[id number]]]

## 2019-02-12 NOTE — DISCHARGE NOTE ADULT - MEDICATION SUMMARY - MEDICATIONS TO STOP TAKING
I will STOP taking the medications listed below when I get home from the hospital:    Lantus 100 units/mL subcutaneous solution  -- 20 units every morning    sulfamethoxazole-trimethoprim 800 mg-160 mg oral tablet  -- 1 tab(s) by mouth every 12 hours    Levaquin 500 mg oral tablet  -- 1 tab(s) by mouth once a day   -- Avoid prolonged or excessive exposure to direct and/or artificial sunlight while taking this medication.  Do not take dairy products, antacids, or iron preparations within one hour of this medication.  Finish all this medication unless otherwise directed by prescriber.  May cause drowsiness or dizziness.  Medication should be taken with plenty of water.    Lantus 100 units/mL subcutaneous solution  -- 15  subcutaneous    Bactrim  mg-160 mg oral tablet  -- 1 tab(s) by mouth 2 times a day

## 2019-02-12 NOTE — DISCHARGE NOTE ADULT - CARE PLAN
Principal Discharge DX:	Bronchiectasis with acute exacerbation  Goal:	Stable  Assessment and plan of treatment:	- ID Dr Wolfe & Pulm house consulted  - Sputum AFB x3- negative x 3, Sputum Cx- Pseudomanas, RVP- neg  - s/p Solumedrol - changed to Prednisone 40mg x5 days till 2/14  - started Zosyn / Bactrim for S maltophilia  - 2/11 per ID Pseudomonas may be a colonizer. Stop Zosyn on 2/13 completed 5 days treatment  - 02/03 Blood Culture- Negative   - CT sinuses - Extensive paranasal sinus disease with obstruction of the right frontal and sphenoethmoidal recesses. Left maxillary sinus disease. Acute sinusitis  - Continue flutter valve / aerobika device. Flonase for post nasal drip, Bronchodilators  Secondary Diagnosis:	PEMBERTON (dyspnea on exertion)  Assessment and plan of treatment:	- ID Dr Donaghy & Pulm house consulted  - Sputum AFB x3- negative x 3, Sputum Cx- Pseudomanas, RVP- neg  - s/p Solumedrol - changed to Prednisone 40mg x5 days till 2/14  - started Zosyn / Bactrim for S maltophilia  - 2/11 per ID Pseudomonas may be a colonizer. Stop Zosyn on 2/13 completed 5 days treatment  - 02/03 Blood Culture- Negative   - CT sinuses - Extensive paranasal sinus disease with obstruction of the right frontal and sphenoethmoidal recesses. Left maxillary sinus disease. Acute sinusitis  - Continue flutter valve / aerobika device. Flonase for post nasal drip, Bronchodilators  Secondary Diagnosis:	Type 2 diabetes mellitus with hyperglycemia, with long-term current use of insulin  Assessment and plan of treatment:	- Monitor finger sticks pre-meal and bedtime, low salt, fat and carbohydrate diet, minimize glucose intake  - Exercise daily for at least 30 minutes and weight loss.  Follow up with primary care physician and endocrinologist for routine Hemoglobin A1C checks and management  - Follow up with your ophthalmologist for routine yearly vision exams  Secondary Diagnosis:	Sinusitis, acute  Assessment and plan of treatment:	- CT sinuses - Extensive paranasal sinus disease with obstruction of the right frontal and sphenoethmoidal recesses. Left maxillary sinus disease. Acute sinusitis  - continue using Flonase as prescribed  - Follow up with ENT doctor as outpatient for further management and treatment Principal Discharge DX:	Bronchiectasis with acute exacerbation  Goal:	Stable  Assessment and plan of treatment:	- ID Dr Wolfe & Pulm house consulted  - Sputum AFB x3- negative x 3, Sputum Cx- Pseudomonas, RVP- neg  - s/p Solumedrol - changed to Prednisone 40mg x5 days till 2/14  - started Zosyn / Bactrim for S maltophilia completed in the hospital. Per Pulm doctor no need to continue with Bactrim for now, follow as outpatient with Pulm  - 2/11 per ID Pseudomonas may be a colonizer. Stopped Zosyn on 2/13 completed 5 days treatment  - 02/03 Blood Culture- Negative   - CT sinuses - Extensive paranasal sinus disease with obstruction of the right frontal and sphenoethmoidal recesses. Left maxillary sinus disease. Acute sinusitis  - Continue flutter valve / aerobika device. Flonase for post nasal drip, Bronchodilators  Secondary Diagnosis:	PEMBERTON (dyspnea on exertion)  Assessment and plan of treatment:	- ID Dr Wolfe & Pulm house consulted  - Sputum AFB x3- negative x 3, Sputum Cx- Pseudomonas, RVP- neg  - s/p Solumedrol - changed to Prednisone 40mg x5 days till 2/14  - started Zosyn / Bactrim for S maltophilia  - 2/11 per ID Pseudomonas may be a colonizer. Stop Zosyn on 2/13 completed 5 days treatment  - 02/03 Blood Culture- Negative   - CT sinuses - Extensive paranasal sinus disease with obstruction of the right frontal and sphenoethmoidal recesses. Left maxillary sinus disease. Acute sinusitis  - Continue flutter valve / aerobika device. Flonase for post nasal drip, Bronchodilators  Secondary Diagnosis:	Type 2 diabetes mellitus with hyperglycemia, with long-term current use of insulin  Assessment and plan of treatment:	- Monitor finger sticks pre-meal and bedtime, low salt, fat and carbohydrate diet, minimize glucose intake  - Exercise daily for at least 30 minutes and weight loss.  Follow up with primary care physician and endocrinologist for routine Hemoglobin A1C checks and management  - Follow up with your ophthalmologist for routine yearly vision exams  Secondary Diagnosis:	Sinusitis, acute  Assessment and plan of treatment:	- CT sinuses - Extensive paranasal sinus disease with obstruction of the right frontal and sphenoethmoidal recesses. Left maxillary sinus disease. Acute sinusitis  - continue using Flonase as prescribed  - Follow up with ENT doctor as outpatient for further management and treatment Principal Discharge DX:	Bronchiectasis with acute exacerbation  Goal:	Stable  Assessment and plan of treatment:	- ID Dr Wolfe & Pulm house consulted  - Sputum AFB x3- negative x 3, Sputum Cx- Pseudomonas, RVP- neg  - s/p Solumedrol - changed to Prednisone 40mg x5 days till 2/14  - started Zosyn / Bactrim for S maltophilia completed in the hospital. Per Pulm doctor no need to continue with Bactrim for now, follow as outpatient with Pulm Dr Young on 2/26/19 at 1pm  - 2/11 per ID Pseudomonas may be a colonizer. Stopped Zosyn on 2/13 completed 5 days treatment  - 02/03 Blood Culture- Negative   - CT sinuses - Extensive paranasal sinus disease with obstruction of the right frontal and sphenoethmoidal recesses. Left maxillary sinus disease. Acute sinusitis  - Continue flutter valve / aerobika device. Flonase for post nasal drip, Bronchodilators  Secondary Diagnosis:	PEMBERTON (dyspnea on exertion)  Assessment and plan of treatment:	- ID Dr Wolfe & Pulm house consulted  - Sputum AFB x3- negative x 3, Sputum Cx- Pseudomonas, RVP- neg  - s/p Solumedrol - changed to Prednisone 40mg x5 days till 2/14  - started Zosyn / Bactrim for S maltophilia  - 2/11 per ID Pseudomonas may be a colonizer. Stop Zosyn on 2/13 completed 5 days treatment  - 02/03 Blood Culture- Negative   - CT sinuses - Extensive paranasal sinus disease with obstruction of the right frontal and sphenoethmoidal recesses. Left maxillary sinus disease. Acute sinusitis  - Continue flutter valve / aerobika device. Flonase for post nasal drip, Bronchodilators  Secondary Diagnosis:	Type 2 diabetes mellitus with hyperglycemia, with long-term current use of insulin  Assessment and plan of treatment:	- Monitor finger sticks pre-meal and bedtime, low salt, fat and carbohydrate diet, minimize glucose intake  - Exercise daily for at least 30 minutes and weight loss.  Follow up with primary care physician and endocrinologist for routine Hemoglobin A1C checks and management  - Follow up with your ophthalmologist for routine yearly vision exams  Secondary Diagnosis:	Sinusitis, acute  Assessment and plan of treatment:	- CT sinuses - Extensive paranasal sinus disease with obstruction of the right frontal and sphenoethmoidal recesses. Left maxillary sinus disease. Acute sinusitis  - continue using Flonase as prescribed  - Follow up with ENT doctor as outpatient for further management and treatment

## 2019-02-12 NOTE — DISCHARGE NOTE ADULT - SECONDARY DIAGNOSIS.
PEMBERTON (dyspnea on exertion) Type 2 diabetes mellitus with hyperglycemia, with long-term current use of insulin Sinusitis, acute

## 2019-02-12 NOTE — DISCHARGE NOTE ADULT - PATIENT PORTAL LINK FT
You can access the [facility] Patient Portal, offered by [facility], by registering with the following website: [email or web address]

## 2019-02-12 NOTE — DISCHARGE NOTE ADULT - PLAN OF CARE
Stable - ID Dr Wolfe & Pulm house consulted  - Sputum AFB x3- negative x 3, Sputum Cx- Pseudomanas, RVP- neg  - s/p Solumedrol - changed to Prednisone 40mg x5 days till 2/14  - started Zosyn / Bactrim for S maltophilia  - 2/11 per ID Pseudomonas may be a colonizer. Stop Zosyn on 2/13 completed 5 days treatment  - 02/03 Blood Culture- Negative   - CT sinuses - Extensive paranasal sinus disease with obstruction of the right frontal and sphenoethmoidal recesses. Left maxillary sinus disease. Acute sinusitis  - Continue flutter valve / aerobika device. Flonase for post nasal drip, Bronchodilators - Monitor finger sticks pre-meal and bedtime, low salt, fat and carbohydrate diet, minimize glucose intake  - Exercise daily for at least 30 minutes and weight loss.  Follow up with primary care physician and endocrinologist for routine Hemoglobin A1C checks and management  - Follow up with your ophthalmologist for routine yearly vision exams - CT sinuses - Extensive paranasal sinus disease with obstruction of the right frontal and sphenoethmoidal recesses. Left maxillary sinus disease. Acute sinusitis  - continue using Flonase as prescribed  - Follow up with ENT doctor as outpatient for further management and treatment - ID Dr Wolfe & Pulm [place] consulted  - Sputum AFB x3- negative x 3, Sputum Cx- Pseudomonas, RVP- neg  - s/p Solumedrol - changed to Prednisone 40mg x5 days till 2/14  - started Zosyn / Bactrim for S maltophilia completed in the hospital. Per Pulm doctor no need to continue with Bactrim for now, follow as outpatient with Pulm  - 2/11 per ID Pseudomonas may be a colonizer. Stopped Zosyn on 2/13 completed 5 days treatment  - 02/03 Blood Culture- Negative   - CT sinuses - Extensive paranasal sinus disease with obstruction of the right frontal and sphenoethmoidal recesses. Left maxillary sinus disease. Acute sinusitis  - Continue flutter valve / aerobika device. Flonase for post nasal drip, Bronchodilators - ID Dr Wolfe & Pulm house consulted  - Sputum AFB x3- negative x 3, Sputum Cx- Pseudomonas, RVP- neg  - s/p Solumedrol - changed to Prednisone 40mg x5 days till 2/14  - started Zosyn / Bactrim for S maltophilia  - 2/11 per ID Pseudomonas may be a colonizer. Stop Zosyn on 2/13 completed 5 days treatment  - 02/03 Blood Culture- Negative   - CT sinuses - Extensive paranasal sinus disease with obstruction of the right frontal and sphenoethmoidal recesses. Left maxillary sinus disease. Acute sinusitis  - Continue flutter valve / aerobika device. Flonase for post nasal drip, Bronchodilators - ID Dr Wolfe & Pulm house consulted  - Sputum AFB x3- negative x 3, Sputum Cx- Pseudomonas, RVP- neg  - s/p Solumedrol - changed to Prednisone 40mg x5 days till 2/14  - started Zosyn / Bactrim for S maltophilia completed in the hospital. Per Pulm doctor no need to continue with Bactrim for now, follow as outpatient with Pulm Dr Young on 2/26/19 at 1pm  - 2/11 per ID Pseudomonas may be a colonizer. Stopped Zosyn on 2/13 completed 5 days treatment  - 02/03 Blood Culture- Negative   - CT sinuses - Extensive paranasal sinus disease with obstruction of the right frontal and sphenoethmoidal recesses. Left maxillary sinus disease. Acute sinusitis  - Continue flutter valve / aerobika device. Flonase for post nasal drip, Bronchodilators

## 2019-02-12 NOTE — DISCHARGE NOTE ADULT - HOSPITAL COURSE
63 F with history of HTN, DM2, bronchiectasis (cystic) with a primary ciliary dyskinesia gene variant, ?allergic bronchopulmonary aspergillosis, stenotrophomonas colonization and chronic cough, presented with worsening shortness of breath. Patient also had a previous AFB culture, which was positive, but not TB. CT sinuses with significant disease. CXR grossly unchanged from prior exam. She is afebrile and has no leukocytosis. AFB have been negative x 3 on this admission.    Hospital Course  Bronchiectasis / COPD exacerbation - hx of BRENTON, ID Dr Wolfe & Pulm house consulted. Sputum AFB x3- negative x 3, Sputum Cx- Pseudomonas, RVP- neg, s/p Solumedrol - changed to Prednisone 40mg x5 days till 2/14, started Zosyn per ID completed 5 days on 2/13,  Bactrim. 2/11 per ID Pseudomonas may be a colonizer. Stop Zosyn on 2/13. 02/03 BCx- NTD. CT sinuses - Extensive paranasal sinus disease with obstruction of the right frontal and sphenoethmoidal recesses. Left maxillary sinus disease. Acute sinusitis. Continue flutter valve / aerobika device. Flonase for post nasal drip, Bronchodilators    DM - HgA1C- 8.9%. Lantus, Sliding scale     HTN - Norvasc, Losartan

## 2019-02-12 NOTE — DISCHARGE NOTE ADULT - ADDITIONAL INSTRUCTIONS
Follow up with PCP, Pulm, ENT doctor as outpatient for further management and treatment Follow up with PCP, Pulm Dr Young, ENT doctor as outpatient for further management and treatment  Call [facility] Young to schedule an appointment Follow up with PCP, Pulm Dr Young, ENT doctor as outpatient for further management and treatment  You have appoint with Pulnuria Young on 2/26/19 at 1pm

## 2019-02-13 VITALS — OXYGEN SATURATION: 95 %

## 2019-02-13 LAB
-  AMIKACIN: SIGNIFICANT CHANGE UP
-  AZTREONAM: SIGNIFICANT CHANGE UP
-  CEFEPIME: SIGNIFICANT CHANGE UP
-  CEFTAZIDIME: SIGNIFICANT CHANGE UP
-  CIPROFLOXACIN: SIGNIFICANT CHANGE UP
-  GENTAMICIN: SIGNIFICANT CHANGE UP
-  IMIPENEM: SIGNIFICANT CHANGE UP
-  LEVOFLOXACIN: SIGNIFICANT CHANGE UP
-  MEROPENEM: SIGNIFICANT CHANGE UP
-  PIPERACILLIN/TAZOBACTAM: SIGNIFICANT CHANGE UP
-  TOBRAMYCIN: SIGNIFICANT CHANGE UP
ALBUMIN SERPL ELPH-MCNC: 3.6 G/DL — SIGNIFICANT CHANGE UP (ref 3.3–5)
ALBUMIN SERPL ELPH-MCNC: 3.6 G/DL — SIGNIFICANT CHANGE UP (ref 3.3–5)
ALP SERPL-CCNC: 128 U/L — HIGH (ref 40–120)
ALP SERPL-CCNC: 128 U/L — HIGH (ref 40–120)
ALT FLD-CCNC: 80 U/L — HIGH (ref 4–33)
ALT FLD-CCNC: 80 U/L — HIGH (ref 4–33)
ANION GAP SERPL CALC-SCNC: 10 MMO/L — SIGNIFICANT CHANGE UP (ref 7–14)
AST SERPL-CCNC: 58 U/L — HIGH (ref 4–32)
AST SERPL-CCNC: 58 U/L — HIGH (ref 4–32)
BACTERIA SPT RESP CULT: SIGNIFICANT CHANGE UP
BILIRUB DIRECT SERPL-MCNC: < 0.2 MG/DL — SIGNIFICANT CHANGE UP (ref 0.1–0.2)
BILIRUB SERPL-MCNC: < 0.2 MG/DL — LOW (ref 0.2–1.2)
BILIRUB SERPL-MCNC: < 0.2 MG/DL — LOW (ref 0.2–1.2)
BUN SERPL-MCNC: 21 MG/DL — SIGNIFICANT CHANGE UP (ref 7–23)
CALCIUM SERPL-MCNC: 9.1 MG/DL — SIGNIFICANT CHANGE UP (ref 8.4–10.5)
CHLORIDE SERPL-SCNC: 101 MMOL/L — SIGNIFICANT CHANGE UP (ref 98–107)
CO2 SERPL-SCNC: 26 MMOL/L — SIGNIFICANT CHANGE UP (ref 22–31)
CREAT SERPL-MCNC: 0.78 MG/DL — SIGNIFICANT CHANGE UP (ref 0.5–1.3)
GLUCOSE BLDC GLUCOMTR-MCNC: 203 MG/DL — HIGH (ref 70–99)
GLUCOSE BLDC GLUCOMTR-MCNC: 206 MG/DL — HIGH (ref 70–99)
GLUCOSE BLDC GLUCOMTR-MCNC: 251 MG/DL — HIGH (ref 70–99)
GLUCOSE BLDC GLUCOMTR-MCNC: 263 MG/DL — HIGH (ref 70–99)
GLUCOSE SERPL-MCNC: 93 MG/DL — SIGNIFICANT CHANGE UP (ref 70–99)
GRAM STN SPT: SIGNIFICANT CHANGE UP
HCT VFR BLD CALC: 31.8 % — LOW (ref 34.5–45)
HGB BLD-MCNC: 9.6 G/DL — LOW (ref 11.5–15.5)
MAGNESIUM SERPL-MCNC: 1.9 MG/DL — SIGNIFICANT CHANGE UP (ref 1.6–2.6)
MCHC RBC-ENTMCNC: 21.4 PG — LOW (ref 27–34)
MCHC RBC-ENTMCNC: 30.2 % — LOW (ref 32–36)
MCV RBC AUTO: 70.8 FL — LOW (ref 80–100)
METHOD TYPE: SIGNIFICANT CHANGE UP
NRBC # FLD: 0 K/UL — LOW (ref 25–125)
ORGANISM # SPEC MICROSCOPIC CNT: SIGNIFICANT CHANGE UP
ORGANISM # SPEC MICROSCOPIC CNT: SIGNIFICANT CHANGE UP
PHOSPHATE SERPL-MCNC: 2.7 MG/DL — SIGNIFICANT CHANGE UP (ref 2.5–4.5)
PLATELET # BLD AUTO: 195 K/UL — SIGNIFICANT CHANGE UP (ref 150–400)
PMV BLD: 11.2 FL — SIGNIFICANT CHANGE UP (ref 7–13)
POTASSIUM SERPL-MCNC: 4.4 MMOL/L — SIGNIFICANT CHANGE UP (ref 3.5–5.3)
POTASSIUM SERPL-SCNC: 4.4 MMOL/L — SIGNIFICANT CHANGE UP (ref 3.5–5.3)
PROT SERPL-MCNC: 6.5 G/DL — SIGNIFICANT CHANGE UP (ref 6–8.3)
PROT SERPL-MCNC: 6.5 G/DL — SIGNIFICANT CHANGE UP (ref 6–8.3)
RBC # BLD: 4.49 M/UL — SIGNIFICANT CHANGE UP (ref 3.8–5.2)
RBC # FLD: 17.6 % — HIGH (ref 10.3–14.5)
SODIUM SERPL-SCNC: 137 MMOL/L — SIGNIFICANT CHANGE UP (ref 135–145)
WBC # BLD: 7.93 K/UL — SIGNIFICANT CHANGE UP (ref 3.8–10.5)
WBC # FLD AUTO: 7.93 K/UL — SIGNIFICANT CHANGE UP (ref 3.8–10.5)

## 2019-02-13 PROCEDURE — 99232 SBSQ HOSP IP/OBS MODERATE 35: CPT

## 2019-02-13 RX ORDER — BUDESONIDE AND FORMOTEROL FUMARATE DIHYDRATE 160; 4.5 UG/1; UG/1
2 AEROSOL RESPIRATORY (INHALATION)
Qty: 1 | Refills: 0 | OUTPATIENT
Start: 2019-02-13 | End: 2019-03-14

## 2019-02-13 RX ORDER — INSULIN GLARGINE 100 [IU]/ML
14 INJECTION, SOLUTION SUBCUTANEOUS
Qty: 0 | Refills: 0 | DISCHARGE
Start: 2019-02-13 | End: 2019-03-14

## 2019-02-13 RX ORDER — PANTOPRAZOLE SODIUM 20 MG/1
1 TABLET, DELAYED RELEASE ORAL
Qty: 30 | Refills: 0
Start: 2019-02-13 | End: 2019-03-14

## 2019-02-13 RX ORDER — AMLODIPINE BESYLATE 2.5 MG/1
1 TABLET ORAL
Qty: 30 | Refills: 0
Start: 2019-02-13 | End: 2019-03-14

## 2019-02-13 RX ORDER — ALBUTEROL 90 UG/1
2 AEROSOL, METERED ORAL
Qty: 1 | Refills: 0 | OUTPATIENT
Start: 2019-02-13 | End: 2019-03-14

## 2019-02-13 RX ORDER — FLUTICASONE PROPIONATE 50 MCG
1 SPRAY, SUSPENSION NASAL
Qty: 1 | Refills: 0
Start: 2019-02-13 | End: 2019-03-14

## 2019-02-13 RX ORDER — INSULIN GLARGINE 100 [IU]/ML
15 INJECTION, SOLUTION SUBCUTANEOUS
Qty: 0 | Refills: 0 | COMMUNITY

## 2019-02-13 RX ORDER — SODIUM CHLORIDE 0.65 %
1 AEROSOL, SPRAY (ML) NASAL
Qty: 15 | Refills: 0
Start: 2019-02-13 | End: 2019-02-26

## 2019-02-13 RX ORDER — MONTELUKAST 4 MG/1
1 TABLET, CHEWABLE ORAL
Qty: 30 | Refills: 0 | OUTPATIENT
Start: 2019-02-13 | End: 2019-03-14

## 2019-02-13 RX ORDER — ALBUTEROL 90 UG/1
2 AEROSOL, METERED ORAL
Qty: 0 | Refills: 0 | COMMUNITY

## 2019-02-13 RX ORDER — INSULIN GLARGINE 100 [IU]/ML
15 INJECTION, SOLUTION SUBCUTANEOUS
Qty: 5 | Refills: 0
Start: 2019-02-13 | End: 2019-03-14

## 2019-02-13 RX ORDER — BUDESONIDE AND FORMOTEROL FUMARATE DIHYDRATE 160; 4.5 UG/1; UG/1
2 AEROSOL RESPIRATORY (INHALATION)
Qty: 1 | Refills: 0
Start: 2019-02-13 | End: 2019-03-14

## 2019-02-13 RX ORDER — DOCUSATE SODIUM 100 MG
1 CAPSULE ORAL
Qty: 60 | Refills: 0
Start: 2019-02-13 | End: 2019-03-14

## 2019-02-13 RX ORDER — INSULIN GLARGINE 100 [IU]/ML
15 INJECTION, SOLUTION SUBCUTANEOUS
Qty: 3 | Refills: 0
Start: 2019-02-13 | End: 2019-03-14

## 2019-02-13 RX ORDER — PANTOPRAZOLE SODIUM 20 MG/1
1 TABLET, DELAYED RELEASE ORAL
Qty: 30 | Refills: 0 | OUTPATIENT
Start: 2019-02-13 | End: 2019-03-14

## 2019-02-13 RX ORDER — ALBUTEROL 90 UG/1
2 AEROSOL, METERED ORAL
Qty: 1 | Refills: 0
Start: 2019-02-13 | End: 2019-03-14

## 2019-02-13 RX ORDER — BUDESONIDE AND FORMOTEROL FUMARATE DIHYDRATE 160; 4.5 UG/1; UG/1
2 AEROSOL RESPIRATORY (INHALATION)
Qty: 0 | Refills: 0 | COMMUNITY

## 2019-02-13 RX ORDER — MONTELUKAST 4 MG/1
1 TABLET, CHEWABLE ORAL
Qty: 30 | Refills: 0
Start: 2019-02-13 | End: 2019-03-14

## 2019-02-13 RX ADMIN — PIPERACILLIN AND TAZOBACTAM 25 GRAM(S): 4; .5 INJECTION, POWDER, LYOPHILIZED, FOR SOLUTION INTRAVENOUS at 14:24

## 2019-02-13 RX ADMIN — Medication 1 MILLIGRAM(S): at 14:23

## 2019-02-13 RX ADMIN — Medication 3 MILLILITER(S): at 05:35

## 2019-02-13 RX ADMIN — PANTOPRAZOLE SODIUM 40 MILLIGRAM(S): 20 TABLET, DELAYED RELEASE ORAL at 05:58

## 2019-02-13 RX ADMIN — MONTELUKAST 10 MILLIGRAM(S): 4 TABLET, CHEWABLE ORAL at 14:23

## 2019-02-13 RX ADMIN — Medication 200 MILLIGRAM(S): at 08:27

## 2019-02-13 RX ADMIN — Medication 1 SPRAY(S): at 05:58

## 2019-02-13 RX ADMIN — POLYETHYLENE GLYCOL 3350 17 GRAM(S): 17 POWDER, FOR SOLUTION ORAL at 14:24

## 2019-02-13 RX ADMIN — ENOXAPARIN SODIUM 40 MILLIGRAM(S): 100 INJECTION SUBCUTANEOUS at 05:58

## 2019-02-13 RX ADMIN — LOSARTAN POTASSIUM 50 MILLIGRAM(S): 100 TABLET, FILM COATED ORAL at 05:58

## 2019-02-13 RX ADMIN — Medication 100 MILLIGRAM(S): at 05:58

## 2019-02-13 RX ADMIN — PIPERACILLIN AND TAZOBACTAM 25 GRAM(S): 4; .5 INJECTION, POWDER, LYOPHILIZED, FOR SOLUTION INTRAVENOUS at 05:59

## 2019-02-13 RX ADMIN — Medication 6: at 17:47

## 2019-02-13 RX ADMIN — Medication 6: at 12:39

## 2019-02-13 RX ADMIN — Medication 40 MILLIGRAM(S): at 05:58

## 2019-02-13 RX ADMIN — Medication 2 TABLET(S): at 05:58

## 2019-02-13 RX ADMIN — Medication 3 MILLILITER(S): at 15:51

## 2019-02-13 RX ADMIN — Medication 1 TABLET(S): at 14:23

## 2019-02-13 RX ADMIN — Medication 4: at 08:26

## 2019-02-13 RX ADMIN — Medication 1 TABLET(S): at 05:58

## 2019-02-13 RX ADMIN — PREGABALIN 1000 MICROGRAM(S): 225 CAPSULE ORAL at 14:23

## 2019-02-13 RX ADMIN — Medication 3 MILLILITER(S): at 10:28

## 2019-02-13 NOTE — PROGRESS NOTE ADULT - REASON FOR ADMISSION
Bronchiectasis, Dyspnea on Exertion, Cough and Shortness of Breath

## 2019-02-13 NOTE — PROGRESS NOTE ADULT - SUBJECTIVE AND OBJECTIVE BOX
Interval Events:  Sitting in bed comfortably  On Zosyn since 2/8 and Bactrim since 2/5  Awaiting approval for home chest vest pending paperwork    REVIEW OF SYSTEMS:  [x] All other systems negative  [ ] Unable to assess ROS because ________    OBJECTIVE:  ICU Vital Signs Last 24 Hrs  T(C): 36.4 (12 Feb 2019 05:10), Max: 36.6 (11 Feb 2019 13:39)  T(F): 97.5 (12 Feb 2019 05:10), Max: 97.9 (11 Feb 2019 21:01)  HR: 82 (12 Feb 2019 09:55) (78 - 88)  BP: 116/67 (12 Feb 2019 05:10) (116/67 - 133/69)  BP(mean): --  ABP: --  ABP(mean): --  RR: 17 (12 Feb 2019 05:10) (16 - 17)  SpO2: 97% (12 Feb 2019 09:55) (96% - 100%)        02-11 @ 07:01  -  02-12 @ 07:00  --------------------------------------------------------  IN: 400 mL / OUT: 400 mL / NET: 0 mL      CAPILLARY BLOOD GLUCOSE      POCT Blood Glucose.: 178 mg/dL (12 Feb 2019 07:35)      PHYSICAL EXAM:  General: Awake, alert, oriented X 3.   HEENT: Atraumatic, normocephalic.   Respiratory: Normal chest expansion. B/l rhonchi.  Cardiovascular: S1 S2 normal. No murmurs, rubs or gallops.   Abdomen: Soft, non-tender, non-distended. No organomegaly.  Extremities: Warm to touch. Peripheral pulse palpable. No pedal edema.   Skin: No rashes or skin lesions  Neurological: Motor and sensory examination equal and normal in all four extremities.  Psychiatry: Appropriate mood and affect.      HOSPITAL MEDICATIONS:  enoxaparin Injectable 40 milliGRAM(s) SubCutaneous every 24 hours    piperacillin/tazobactam IVPB. 3.375 Gram(s) IV Intermittent every 8 hours  trimethoprim  160 mG/sulfamethoxazole 800 mG 2 Tablet(s) Oral every 12 hours    amLODIPine   Tablet 5 milliGRAM(s) Oral daily  losartan 50 milliGRAM(s) Oral daily    dextrose 40% Gel 15 Gram(s) Oral once PRN  dextrose 50% Injectable 12.5 Gram(s) IV Push once  dextrose 50% Injectable 25 Gram(s) IV Push once  dextrose 50% Injectable 25 Gram(s) IV Push once  glucagon  Injectable 1 milliGRAM(s) IntraMuscular once PRN  insulin glargine Injectable (LANTUS) 20 Unit(s) SubCutaneous at bedtime  insulin lispro (HumaLOG) corrective regimen sliding scale   SubCutaneous three times a day before meals  insulin lispro (HumaLOG) corrective regimen sliding scale   SubCutaneous at bedtime  predniSONE   Tablet 40 milliGRAM(s) Oral daily    ALBUTerol/ipratropium for Nebulization 3 milliLiter(s) Nebulizer every 6 hours  guaiFENesin   Syrup  (Sugar-Free) 200 milliGRAM(s) Oral every 6 hours PRN  montelukast 10 milliGRAM(s) Oral daily    acetaminophen   Tablet .. 650 milliGRAM(s) Oral every 6 hours PRN  melatonin 6 milliGRAM(s) Oral at bedtime PRN    aluminum hydroxide/magnesium hydroxide/simethicone Suspension 30 milliLiter(s) Oral every 4 hours PRN  docusate sodium 100 milliGRAM(s) Oral daily  docusate sodium 100 milliGRAM(s) Oral two times a day  pantoprazole    Tablet 40 milliGRAM(s) Oral before breakfast  senna 1 Tablet(s) Oral daily  senna 1 Tablet(s) Oral at bedtime        calcium carbonate 1250 mG  + Vitamin D (OsCal 500 + D) 1 Tablet(s) Oral two times a day  cyanocobalamin 1000 MICROGram(s) Oral daily  dextrose 5%. 1000 milliLiter(s) IV Continuous <Continuous>  folic acid 1 milliGRAM(s) Oral daily  multivitamin 1 Tablet(s) Oral daily  sodium chloride 0.9%. 1000 milliLiter(s) IV Continuous <Continuous>      Biotene Dry Mouth Oral Rinse 5 milliLiter(s) Swish and Spit every 4 hours PRN  fluticasone propionate 50 MICROgram(s)/spray Nasal Spray 1 Spray(s) Both Nostrils two times a day  sodium chloride 0.65% Nasal 1 Spray(s) Both Nostrils two times a day PRN        LABS:                        10.3   8.60  )-----------( 213      ( 11 Feb 2019 06:54 )             33.9     Hgb Trend: 10.3<--, 10.1<--, 10.1<--, 10.9<--, 11.0<--  02-11    136  |  102  |  23  ----------------------------<  66<L>  4.5   |  24  |  0.93    Ca    9.3      11 Feb 2019 06:54  Phos  3.1     02-11  Mg     2.0     02-11    TPro  7.0  /  Alb  3.6  /  TBili  < 0.2<L>  /  DBili  x   /  AST  67<H>  /  ALT  66<H>  /  AlkPhos  139<H>  02-11    Creatinine Trend: 0.93<--, 0.90<--, 0.84<--, 0.72<--, 0.85<--, 0.66<--            MICROBIOLOGY:     RADIOLOGY:  [ ] Reviewed and interpreted by me    ASSESSMENT AND RECOMMENDATION:    63 F with history of HTN, DM2, bronchiectasis (cystic) with a primary ciliary dyskinesia gene variant, ?allergic bronchopulmonary aspergillosis, stenotrophomonas colonization and chronic cough, presented with worsening shortness of breath. Patient also had a previous AFB culture, which was positive, but not TB. CT sinuses with significant disease. CXR grossly unchanged from prior exam. She is afebrile and has no leukocytosis. AFB have been negative x 3 on this admission.    On Zosyn for pseudomonas and Bactrim for S maltophilia  On 5 day course of prednisone for wheezing which has improved  Airway clearance - will need to approve her for chest vest   Continue flutter valve/aerobika device  Flonase for post nasal drip  Bronchodilators  OOB to chair, ambulation  Significant sinus disease - should follow up with ENT as outpatient    Rosas Nieto MD  Pulmonary and Critical Care Fellow  [phone]
Patient is a 63y old  Female who presents with a chief complaint of Bronchiectasis, Dyspnea on Exertion, Cough and Shortness of Breath (06 Feb 2019 11:12)      SUBJECTIVE / OVERNIGHT EVENTS:    Events noted.  C/o Cough/No N/V    MEDICATIONS  (STANDING):  ALBUTerol/ipratropium for Nebulization 3 milliLiter(s) Nebulizer every 6 hours  amLODIPine   Tablet 5 milliGRAM(s) Oral daily  calcium carbonate 1250 mG  + Vitamin D (OsCal 500 + D) 1 Tablet(s) Oral two times a day  cyanocobalamin 1000 MICROGram(s) Oral daily  dextrose 5%. 1000 milliLiter(s) (50 mL/Hr) IV Continuous <Continuous>  dextrose 50% Injectable 12.5 Gram(s) IV Push once  dextrose 50% Injectable 25 Gram(s) IV Push once  dextrose 50% Injectable 25 Gram(s) IV Push once  docusate sodium 100 milliGRAM(s) Oral two times a day  enoxaparin Injectable 40 milliGRAM(s) SubCutaneous every 24 hours  fluticasone propionate 50 MICROgram(s)/spray Nasal Spray 1 Spray(s) Both Nostrils two times a day  folic acid 1 milliGRAM(s) Oral daily  insulin glargine Injectable (LANTUS) 20 Unit(s) SubCutaneous at bedtime  insulin lispro (HumaLOG) corrective regimen sliding scale   SubCutaneous three times a day before meals  insulin lispro (HumaLOG) corrective regimen sliding scale   SubCutaneous at bedtime  levoFLOXacin IVPB 750 milliGRAM(s) IV Intermittent every 24 hours  levoFLOXacin IVPB      losartan 50 milliGRAM(s) Oral daily  montelukast 10 milliGRAM(s) Oral daily  multivitamin 1 Tablet(s) Oral daily  senna 1 Tablet(s) Oral at bedtime  sodium chloride 0.9%. 1000 milliLiter(s) (75 mL/Hr) IV Continuous <Continuous>  trimethoprim  160 mG/sulfamethoxazole 800 mG 2 Tablet(s) Oral every 12 hours    MEDICATIONS  (PRN):  acetaminophen   Tablet .. 650 milliGRAM(s) Oral every 6 hours PRN Moderate Pain (4 - 6)  Biotene Dry Mouth Oral Rinse 5 milliLiter(s) Swish and Spit every 4 hours PRN Mouth Care/Dryness  dextrose 40% Gel 15 Gram(s) Oral once PRN Blood Glucose LESS THAN 70 milliGRAM(s)/deciliter  glucagon  Injectable 1 milliGRAM(s) IntraMuscular once PRN Glucose LESS THAN 70 milligrams/deciliter  guaiFENesin   Syrup  (Sugar-Free) 200 milliGRAM(s) Oral every 6 hours PRN Cough  sodium chloride 0.65% Nasal 1 Spray(s) Both Nostrils two times a day PRN Nasal Congestion        CAPILLARY BLOOD GLUCOSE      POCT Blood Glucose.: 148 mg/dL (06 Feb 2019 22:40)  POCT Blood Glucose.: 129 mg/dL (06 Feb 2019 17:13)  POCT Blood Glucose.: 109 mg/dL (06 Feb 2019 12:12)  POCT Blood Glucose.: 195 mg/dL (06 Feb 2019 07:48)  POCT Blood Glucose.: 181 mg/dL (06 Feb 2019 06:34)    I&O's Summary    05 Feb 2019 07:01  -  06 Feb 2019 07:00  --------------------------------------------------------  IN: 1650 mL / OUT: 0 mL / NET: 1650 mL    06 Feb 2019 07:01  -  07 Feb 2019 00:01  --------------------------------------------------------  IN: 1480 mL / OUT: 300 mL / NET: 1180 mL        PHYSICAL EXAM:  GENERAL: NAD  NECK: Supple, No JVD  CHEST/LUNG: Clear to auscultation bilaterally; Bibasilar crackles  HEART: Regular rate and rhythm; No murmurs, rubs, or gallops  ABDOMEN: Soft, Nontender, Nondistended; Bowel sounds present  EXTREMITIES:   No clubbing, cyanosis, or edema  NEUROLOGY: AAO       LABS:                        11.0   11.62 )-----------( 235      ( 06 Feb 2019 05:30 )             36.6     02-06    137  |  103  |  25<H>  ----------------------------<  79  3.8   |  24  |  0.85    Ca    9.1      06 Feb 2019 05:30  Phos  2.8     02-06  Mg     1.9     02-06    TPro  7.0  /  Alb  3.5  /  TBili  0.3  /  DBili  x   /  AST  30  /  ALT  44<H>  /  AlkPhos  143<H>  02-06            CAPILLARY BLOOD GLUCOSE      POCT Blood Glucose.: 148 mg/dL (06 Feb 2019 22:40)  POCT Blood Glucose.: 129 mg/dL (06 Feb 2019 17:13)  POCT Blood Glucose.: 109 mg/dL (06 Feb 2019 12:12)  POCT Blood Glucose.: 195 mg/dL (06 Feb 2019 07:48)  POCT Blood Glucose.: 181 mg/dL (06 Feb 2019 06:34)    02-05 @ 17:50  Culture-urine --  Culture results --  method type --  Organism --  Organism Identification --  Specimen source SPUTUM  02-05 @ 05:43  Culture-urine --  Culture results --  method type --  Organism --  Organism Identification --  Specimen source SPUTUM  02-04 @ 15:33  Culture-urine --  Culture results --  method type --  Organism --  Organism Identification --  Specimen source SPUTUM  02-03 @ 22:38  Culture-urine   AFTER 24 HOURS INCUBATION  CALB^Candida albicans  COLONY COUNT: LESS THAN 10,000 CFU/ML  Culture results --  method type --  Organism --  Organism Identification --  Specimen source URINE MIDSTREAM  02-03 @ 20:01  Culture-urine --  Culture results --  method type --  Organism --  Organism Identification --  Specimen source BLOOD PERIPHERAL  02-03 @ 19:59  Culture-urine --  Culture results --  method type --  Organism --  Organism Identification --  Specimen source BLOOD VENOUS           02-05 @ 17:50  Culture blood --  Culture results --  Gram stain --  Gram stain blood --  Method type --  Organism --  Organism identification --  Specimen source SPUTUM   02-05 @ 05:43  Culture blood --  Culture results --  Gram stain --  Gram stain blood --  Method type --  Organism --  Organism identification --  Specimen source SPUTUM   02-04 @ 15:33  Culture blood --  Culture results --  Gram stain --  Gram stain blood --  Method type --  Organism --  Organism identification --  Specimen source SPUTUM   02-03 @ 22:38  Culture blood --  Culture results --  Gram stain --  Gram stain blood --  Method type --  Organism --  Organism identification --  Specimen source URINE MIDSTREAM   02-03 @ 20:01  Culture blood   NO ORGANISMS ISOLATED  NO ORGANISMS ISOLATED AT 72 HRS.  Culture results --  Gram stain --  Gram stain blood --  Method type --  Organism --  Organism identification --  Specimen source BLOOD PERIPHERAL   02-03 @ 19:59  Culture blood   NO ORGANISMS ISOLATED  NO ORGANISMS ISOLATED AT 72 HRS.  Culture results --  Gram stain --  Gram stain blood --  Method type --  Organism --  Organism identification --  Specimen source BLOOD VENOUS      RADIOLOGY & ADDITIONAL TESTS:    Imaging Personally Reviewed:    Consultant(s) Notes Reviewed:      Care Discussed with Consultants/Other Providers:
Follow Up:      Inverval History/ROS:Patient is a 63y old  Female who presents with a chief complaint of Bronchiectasis, Dyspnea on Exertion, Cough and Shortness of Breath (05 Feb 2019 17:31)    + cough- a little better with chest vest  No fever      Allergies    No Known Allergies    Intolerances        ANTIMICROBIALS:  levoFLOXacin IVPB 750 every 24 hours  levoFLOXacin IVPB    trimethoprim  160 mG/sulfamethoxazole 800 mG 2 every 12 hours      OTHER MEDS:  acetaminophen   Tablet .. 650 milliGRAM(s) Oral every 6 hours PRN  ALBUTerol/ipratropium for Nebulization 3 milliLiter(s) Nebulizer every 6 hours  amLODIPine   Tablet 5 milliGRAM(s) Oral daily  Biotene Dry Mouth Oral Rinse 5 milliLiter(s) Swish and Spit every 4 hours PRN  calcium carbonate 1250 mG  + Vitamin D (OsCal 500 + D) 1 Tablet(s) Oral two times a day  cyanocobalamin 1000 MICROGram(s) Oral daily  dextrose 40% Gel 15 Gram(s) Oral once PRN  dextrose 5%. 1000 milliLiter(s) IV Continuous <Continuous>  dextrose 50% Injectable 12.5 Gram(s) IV Push once  dextrose 50% Injectable 25 Gram(s) IV Push once  dextrose 50% Injectable 25 Gram(s) IV Push once  docusate sodium 100 milliGRAM(s) Oral two times a day  enoxaparin Injectable 40 milliGRAM(s) SubCutaneous every 24 hours  fluticasone propionate 50 MICROgram(s)/spray Nasal Spray 1 Spray(s) Both Nostrils two times a day  folic acid 1 milliGRAM(s) Oral daily  glucagon  Injectable 1 milliGRAM(s) IntraMuscular once PRN  guaiFENesin   Syrup  (Sugar-Free) 200 milliGRAM(s) Oral every 6 hours PRN  insulin glargine Injectable (LANTUS) 20 Unit(s) SubCutaneous at bedtime  insulin lispro (HumaLOG) corrective regimen sliding scale   SubCutaneous three times a day before meals  insulin lispro (HumaLOG) corrective regimen sliding scale   SubCutaneous at bedtime  losartan 50 milliGRAM(s) Oral daily  montelukast 10 milliGRAM(s) Oral daily  multivitamin 1 Tablet(s) Oral daily  senna 1 Tablet(s) Oral at bedtime  sodium chloride 0.65% Nasal 1 Spray(s) Both Nostrils two times a day PRN  sodium chloride 0.9%. 1000 milliLiter(s) IV Continuous <Continuous>      Vital Signs Last 24 Hrs  T(C): 36.7 (06 Feb 2019 05:34), Max: 36.8 (05 Feb 2019 21:11)  T(F): 98 (06 Feb 2019 05:34), Max: 98.3 (05 Feb 2019 21:11)  HR: 89 (06 Feb 2019 11:05) (78 - 93)  BP: 112/66 (06 Feb 2019 05:34) (112/66 - 128/62)  BP(mean): --  RR: 16 (06 Feb 2019 05:34) (16 - 18)  SpO2: 98% (06 Feb 2019 11:05) (94% - 98%)    PHYSICAL EXAM:  General: [x ] non-toxic  HEAD/EYES: [ ] PERRL [x ] white sclera [ ] icterus  ENT:  [ ] normal [x ] supple [ ] thrush [ ] pharyngeal exudate  Cardiovascular:   [ ] murmur [x ] normal [ ] PPM/AICD  Respiratory:  [x ] course rhonchi  GI:  [x ] soft, non-tender, normal bowel sounds  :  [ ] francisco [ ] no CVA tenderness   Musculoskeletal:  [x ] no synovitis  Neurologic:  [x ] non-focal exam   Skin:  [x ] no rash  Lymph: [ ] no lymphadenopathy  Psychiatric:  [ ] appropriate affect [ x] alert & oriented  Lines:  [ ] no phlebitis [ ] central line                                11.0   11.62 )-----------( 235      ( 06 Feb 2019 05:30 )             36.6       02-06    137  |  103  |  25<H>  ----------------------------<  79  3.8   |  24  |  0.85    Ca    9.1      06 Feb 2019 05:30  Phos  2.8     02-06  Mg     1.9     02-06    TPro  7.0  /  Alb  3.5  /  TBili  0.3  /  DBili  x   /  AST  30  /  ALT  44<H>  /  AlkPhos  143<H>  02-06          MICROBIOLOGY:Culture - Respiratory:   CULTURE IN PROGRESS, FURTHER REPORT TO FOLLOW. (02-05-19 @ 17:50)      RADIOLOGY:
Follow Up:      Inverval History/ROS:Patient is a 63y old  Female who presents with a chief complaint of Bronchiectasis, Dyspnea on Exertion, Cough and Shortness of Breath (07 Feb 2019 17:22)    She is still sob, but has been able to bring up more mucus.  No fever.    Allergies    No Known Allergies    Intolerances        ANTIMICROBIALS:  levoFLOXacin IVPB 750 every 24 hours  levoFLOXacin IVPB    trimethoprim  160 mG/sulfamethoxazole 800 mG 2 every 12 hours      OTHER MEDS:  acetaminophen   Tablet .. 650 milliGRAM(s) Oral every 6 hours PRN  ALBUTerol/ipratropium for Nebulization 3 milliLiter(s) Nebulizer every 6 hours  aluminum hydroxide/magnesium hydroxide/simethicone Suspension 30 milliLiter(s) Oral every 4 hours PRN  amLODIPine   Tablet 5 milliGRAM(s) Oral daily  Biotene Dry Mouth Oral Rinse 5 milliLiter(s) Swish and Spit every 4 hours PRN  calcium carbonate 1250 mG  + Vitamin D (OsCal 500 + D) 1 Tablet(s) Oral two times a day  cyanocobalamin 1000 MICROGram(s) Oral daily  dextrose 40% Gel 15 Gram(s) Oral once PRN  dextrose 5%. 1000 milliLiter(s) IV Continuous <Continuous>  dextrose 50% Injectable 12.5 Gram(s) IV Push once  dextrose 50% Injectable 25 Gram(s) IV Push once  dextrose 50% Injectable 25 Gram(s) IV Push once  docusate sodium 100 milliGRAM(s) Oral daily  docusate sodium 100 milliGRAM(s) Oral two times a day  enoxaparin Injectable 40 milliGRAM(s) SubCutaneous every 24 hours  fluticasone propionate 50 MICROgram(s)/spray Nasal Spray 1 Spray(s) Both Nostrils two times a day  folic acid 1 milliGRAM(s) Oral daily  glucagon  Injectable 1 milliGRAM(s) IntraMuscular once PRN  guaiFENesin   Syrup  (Sugar-Free) 200 milliGRAM(s) Oral every 6 hours PRN  insulin glargine Injectable (LANTUS) 20 Unit(s) SubCutaneous at bedtime  insulin lispro (HumaLOG) corrective regimen sliding scale   SubCutaneous three times a day before meals  insulin lispro (HumaLOG) corrective regimen sliding scale   SubCutaneous at bedtime  losartan 50 milliGRAM(s) Oral daily  montelukast 10 milliGRAM(s) Oral daily  multivitamin 1 Tablet(s) Oral daily  pantoprazole    Tablet 40 milliGRAM(s) Oral before breakfast  senna 1 Tablet(s) Oral daily  senna 1 Tablet(s) Oral at bedtime  sodium chloride 0.65% Nasal 1 Spray(s) Both Nostrils two times a day PRN  sodium chloride 0.9%. 1000 milliLiter(s) IV Continuous <Continuous>      Vital Signs Last 24 Hrs  T(C): 36.9 (07 Feb 2019 14:04), Max: 37.1 (07 Feb 2019 05:54)  T(F): 98.4 (07 Feb 2019 14:04), Max: 98.7 (07 Feb 2019 05:54)  HR: 80 (07 Feb 2019 17:21) (80 - 95)  BP: 107/60 (07 Feb 2019 14:04) (107/60 - 132/67)  BP(mean): --  RR: 17 (07 Feb 2019 14:04) (17 - 17)  SpO2: 95% (07 Feb 2019 17:21) (94% - 97%)    PHYSICAL EXAM:  General: [x ] non-toxic  HEAD/EYES: [ ] PERRL [ ] white sclera [ ] icterus  ENT:  [ ] normal [x ] supple [ ] thrush [ ] pharyngeal exudate  Cardiovascular:   [ ] murmur [x ] normal [ ] PPM/AICD  Respiratory:  [x ] course BS  GI:  [x ] soft, non-tender, normal bowel sounds  :  [ ] francisco [x ] no CVA tenderness   Musculoskeletal:  [x ] no synovitis  Neurologic:  [x ] non-focal exam   Skin:  [x ] no rash  Lymph: [x ] no lymphadenopathy  Psychiatric:  [ ] appropriate affect [x ] alert & oriented  Lines:  [x ] no phlebitis [ ] central line                                10.9   10.41 )-----------( 273      ( 07 Feb 2019 05:11 )             35.5       02-07    136  |  101  |  18  ----------------------------<  114<H>  4.0   |  22  |  0.72    Ca    8.9      07 Feb 2019 05:11  Phos  2.8     02-07  Mg     2.0     02-07    TPro  7.0  /  Alb  3.6  /  TBili  < 0.2<L>  /  DBili  x   /  AST  34<H>  /  ALT  41<H>  /  AlkPhos  138<H>  02-07          MICROBIOLOGY:Culture - Respiratory:   CULTURE IN PROGRESS, FURTHER REPORT TO FOLLOW. (02-05-19 @ 17:50)      RADIOLOGY:
Follow Up:      Inverval History/ROS:Patient is a 63y old  Female who presents with a chief complaint of Bronchiectasis, Dyspnea on Exertion, Cough and Shortness of Breath (07 Feb 2019 17:59)    Stil has cough. Overall, improved.  No fever.      Allergies    No Known Allergies    Intolerances        ANTIMICROBIALS:  levoFLOXacin IVPB 750 every 24 hours  levoFLOXacin IVPB    trimethoprim  160 mG/sulfamethoxazole 800 mG 2 every 12 hours      OTHER MEDS:  acetaminophen   Tablet .. 650 milliGRAM(s) Oral every 6 hours PRN  ALBUTerol/ipratropium for Nebulization 3 milliLiter(s) Nebulizer every 6 hours  aluminum hydroxide/magnesium hydroxide/simethicone Suspension 30 milliLiter(s) Oral every 4 hours PRN  amLODIPine   Tablet 5 milliGRAM(s) Oral daily  Biotene Dry Mouth Oral Rinse 5 milliLiter(s) Swish and Spit every 4 hours PRN  calcium carbonate 1250 mG  + Vitamin D (OsCal 500 + D) 1 Tablet(s) Oral two times a day  cyanocobalamin 1000 MICROGram(s) Oral daily  dextrose 40% Gel 15 Gram(s) Oral once PRN  dextrose 5%. 1000 milliLiter(s) IV Continuous <Continuous>  dextrose 50% Injectable 12.5 Gram(s) IV Push once  dextrose 50% Injectable 25 Gram(s) IV Push once  dextrose 50% Injectable 25 Gram(s) IV Push once  docusate sodium 100 milliGRAM(s) Oral daily  docusate sodium 100 milliGRAM(s) Oral two times a day  enoxaparin Injectable 40 milliGRAM(s) SubCutaneous every 24 hours  fluticasone propionate 50 MICROgram(s)/spray Nasal Spray 1 Spray(s) Both Nostrils two times a day  folic acid 1 milliGRAM(s) Oral daily  glucagon  Injectable 1 milliGRAM(s) IntraMuscular once PRN  guaiFENesin   Syrup  (Sugar-Free) 200 milliGRAM(s) Oral every 6 hours PRN  insulin glargine Injectable (LANTUS) 20 Unit(s) SubCutaneous at bedtime  insulin lispro (HumaLOG) corrective regimen sliding scale   SubCutaneous three times a day before meals  insulin lispro (HumaLOG) corrective regimen sliding scale   SubCutaneous at bedtime  losartan 50 milliGRAM(s) Oral daily  montelukast 10 milliGRAM(s) Oral daily  multivitamin 1 Tablet(s) Oral daily  pantoprazole    Tablet 40 milliGRAM(s) Oral before breakfast  senna 1 Tablet(s) Oral daily  senna 1 Tablet(s) Oral at bedtime  sodium chloride 0.65% Nasal 1 Spray(s) Both Nostrils two times a day PRN  sodium chloride 0.9%. 1000 milliLiter(s) IV Continuous <Continuous>      Vital Signs Last 24 Hrs  T(C): 36.3 (08 Feb 2019 12:18), Max: 36.9 (07 Feb 2019 14:04)  T(F): 97.4 (08 Feb 2019 12:18), Max: 98.4 (07 Feb 2019 14:04)  HR: 83 (08 Feb 2019 12:18) (80 - 93)  BP: 104/57 (08 Feb 2019 12:18) (104/57 - 143/67)  BP(mean): --  RR: 18 (08 Feb 2019 12:18) (17 - 18)  SpO2: 94% (08 Feb 2019 12:18) (94% - 97%)    PHYSICAL EXAM:  General: [ x] non-toxic  HEAD/EYES: [ ] PERRL [x ] white sclera [ ] icterus  ENT:  [ ] normal [x ] supple [ ] thrush [ ] pharyngeal exudate  Cardiovascular:   [ ] murmur [x ] normal [ ] PPM/AICD  Respiratory:  [x ] rhonchi  GI:  [x ] soft, non-tender, normal bowel sounds  :  [ ] francisco [ ] no CVA tenderness   Musculoskeletal:  [ x] no synovitis  Neurologic:  [ ] non-focal exam   Skin:  [ ] no rash  Lymph: [ ] no lymphadenopathy  Psychiatric:  [ x] appropriate affect [ ] alert & oriented  Lines:  [x ] no phlebitis [ ] central line                                10.1   9.19  )-----------( 244      ( 08 Feb 2019 05:59 )             32.8       02-08    138  |  102  |  23  ----------------------------<  84  4.3   |  22  |  0.84    Ca    9.1      08 Feb 2019 05:54  Phos  3.2     02-08  Mg     1.9     02-08    TPro  6.7  /  Alb  3.5  /  TBili  < 0.2<L>  /  DBili  x   /  AST  24  /  ALT  32  /  AlkPhos  130<H>  02-08          MICROBIOLOGY:Culture - Respiratory:   Normal Respiratory Denise Also Present (02-05-19 @ 17:50)      RADIOLOGY:
Follow Up:      Inverval History/ROS:Patient is a 63y old  Female who presents with a chief complaint of Bronchiectasis, Dyspnea on Exertion, Cough and Shortness of Breath (2019 21:33)  No fever.   Still has cough/ sob.       Allergies    No Known Allergies    Intolerances        ANTIMICROBIALS:  levoFLOXacin IVPB 750 every 24 hours  levoFLOXacin IVPB    trimethoprim  160 mG/sulfamethoxazole 800 mG 1 every 12 hours      OTHER MEDS:  acetaminophen   Tablet .. 650 milliGRAM(s) Oral every 6 hours PRN  ALBUTerol/ipratropium for Nebulization 3 milliLiter(s) Nebulizer every 6 hours  amLODIPine   Tablet 5 milliGRAM(s) Oral daily  Biotene Dry Mouth Oral Rinse 5 milliLiter(s) Swish and Spit every 4 hours PRN  calcium carbonate 1250 mG  + Vitamin D (OsCal 500 + D) 1 Tablet(s) Oral two times a day  cyanocobalamin 1000 MICROGram(s) Oral daily  dextrose 40% Gel 15 Gram(s) Oral once PRN  dextrose 5%. 1000 milliLiter(s) IV Continuous <Continuous>  dextrose 50% Injectable 12.5 Gram(s) IV Push once  dextrose 50% Injectable 25 Gram(s) IV Push once  dextrose 50% Injectable 25 Gram(s) IV Push once  docusate sodium 100 milliGRAM(s) Oral two times a day  enoxaparin Injectable 40 milliGRAM(s) SubCutaneous every 24 hours  fluticasone propionate 50 MICROgram(s)/spray Nasal Spray 1 Spray(s) Both Nostrils two times a day  folic acid 1 milliGRAM(s) Oral daily  glucagon  Injectable 1 milliGRAM(s) IntraMuscular once PRN  guaiFENesin   Syrup  (Sugar-Free) 200 milliGRAM(s) Oral every 6 hours PRN  insulin glargine Injectable (LANTUS) 20 Unit(s) SubCutaneous at bedtime  insulin lispro (HumaLOG) corrective regimen sliding scale   SubCutaneous three times a day before meals  insulin lispro (HumaLOG) corrective regimen sliding scale   SubCutaneous at bedtime  losartan 50 milliGRAM(s) Oral daily  montelukast 10 milliGRAM(s) Oral daily  multivitamin 1 Tablet(s) Oral daily  senna 1 Tablet(s) Oral at bedtime  sodium chloride 0.65% Nasal 1 Spray(s) Both Nostrils two times a day PRN  sodium chloride 0.9%. 1000 milliLiter(s) IV Continuous <Continuous>      Vital Signs Last 24 Hrs  T(C): 36.4 (2019 12:09), Max: 36.9 (2019 14:50)  T(F): 97.6 (2019 12:09), Max: 98.5 (2019 14:50)  HR: 78 (2019 12:09) (78 - 96)  BP: 128/62 (2019 12:09) (128/62 - 139/81)  BP(mean): --  RR: 17 (2019 12:09) (16 - 17)  SpO2: 96% (2019 12:09) (95% - 98%)    PHYSICAL EXAM:  General: [x ] non-toxic  HEAD/EYES: [ ] PERRL [ x] white sclera [ ] icterus  ENT:  [ ] normal [ x] supple [ ] thrush [ ] pharyngeal exudate  Cardiovascular:   [ ] murmur [x ] normal [ ] PPM/AICD  Respiratory:  [x ] clear to ausculation bilaterally  GI:  [ ] soft, non-tender, normal bowel sounds  :  [ ] francisco [ ] no CVA tenderness   Musculoskeletal:  [ ] no synovitis  Neurologic:  [ ] non-focal exam   Skin:  [ ] no rash  Lymph: [ ] no lymphadenopathy  Psychiatric:  [ ] appropriate affect [ ] alert & oriented  Lines:  [ ] no phlebitis [ ] central line                                9.7    9.93  )-----------( 227      ( 2019 06:00 )             31.4       02-05    133<L>  |  100  |  21  ----------------------------<  362<H>  4.2   |  23  |  0.66    Ca    9.2      2019 06:00  Phos  3.0     02-05  Mg     1.9     02-05    TPro  6.7  /  Alb  3.5  /  TBili  0.2  /  DBili  x   /  AST  36<H>  /  ALT  44<H>  /  AlkPhos  153<H>  02-05      Urinalysis Basic - ( 2019 21:57 )    Color: LIGHT YELLOW / Appearance: CLEAR / S.021 / pH: 6.5  Gluc: >1000 / Ketone: NEGATIVE  / Bili: NEGATIVE / Urobili: NORMAL   Blood: NEGATIVE / Protein: NEGATIVE / Nitrite: NEGATIVE   Leuk Esterase: MODERATE / RBC: 0-2 / WBC 11-25   Sq Epi: OCC / Non Sq Epi: x / Bacteria: FEW        MICROBIOLOGY:    RADIOLOGY:
Follow Up:      Inverval History/ROS:Patient is a 64y old  Female who presents with a chief complaint of Bronchiectasis, Dyspnea on Exertion, Cough and Shortness of Breath (10 Feb 2019 16:57)    No fever.   Still has cough.   Little change    Allergies    No Known Allergies    Intolerances        ANTIMICROBIALS:  piperacillin/tazobactam IVPB. 3.375 every 8 hours  trimethoprim  160 mG/sulfamethoxazole 800 mG 2 every 12 hours      OTHER MEDS:  acetaminophen   Tablet .. 650 milliGRAM(s) Oral every 6 hours PRN  ALBUTerol/ipratropium for Nebulization 3 milliLiter(s) Nebulizer every 6 hours  aluminum hydroxide/magnesium hydroxide/simethicone Suspension 30 milliLiter(s) Oral every 4 hours PRN  amLODIPine   Tablet 5 milliGRAM(s) Oral daily  Biotene Dry Mouth Oral Rinse 5 milliLiter(s) Swish and Spit every 4 hours PRN  calcium carbonate 1250 mG  + Vitamin D (OsCal 500 + D) 1 Tablet(s) Oral two times a day  cyanocobalamin 1000 MICROGram(s) Oral daily  dextrose 40% Gel 15 Gram(s) Oral once PRN  dextrose 5%. 1000 milliLiter(s) IV Continuous <Continuous>  dextrose 50% Injectable 12.5 Gram(s) IV Push once  dextrose 50% Injectable 25 Gram(s) IV Push once  dextrose 50% Injectable 25 Gram(s) IV Push once  docusate sodium 100 milliGRAM(s) Oral daily  docusate sodium 100 milliGRAM(s) Oral two times a day  enoxaparin Injectable 40 milliGRAM(s) SubCutaneous every 24 hours  fluticasone propionate 50 MICROgram(s)/spray Nasal Spray 1 Spray(s) Both Nostrils two times a day  folic acid 1 milliGRAM(s) Oral daily  glucagon  Injectable 1 milliGRAM(s) IntraMuscular once PRN  guaiFENesin   Syrup  (Sugar-Free) 200 milliGRAM(s) Oral every 6 hours PRN  insulin glargine Injectable (LANTUS) 20 Unit(s) SubCutaneous at bedtime  insulin lispro (HumaLOG) corrective regimen sliding scale   SubCutaneous three times a day before meals  insulin lispro (HumaLOG) corrective regimen sliding scale   SubCutaneous at bedtime  losartan 50 milliGRAM(s) Oral daily  melatonin 6 milliGRAM(s) Oral at bedtime PRN  montelukast 10 milliGRAM(s) Oral daily  multivitamin 1 Tablet(s) Oral daily  pantoprazole    Tablet 40 milliGRAM(s) Oral before breakfast  predniSONE   Tablet 40 milliGRAM(s) Oral daily  senna 1 Tablet(s) Oral daily  senna 1 Tablet(s) Oral at bedtime  sodium chloride 0.65% Nasal 1 Spray(s) Both Nostrils two times a day PRN  sodium chloride 0.9%. 1000 milliLiter(s) IV Continuous <Continuous>      Vital Signs Last 24 Hrs  T(C): 36.2 (11 Feb 2019 07:00), Max: 36.7 (10 Feb 2019 14:10)  T(F): 97.2 (11 Feb 2019 07:00), Max: 98.1 (10 Feb 2019 22:03)  HR: 79 (11 Feb 2019 09:56) (69 - 90)  BP: 113/66 (11 Feb 2019 07:00) (103/55 - 113/66)  BP(mean): --  RR: 17 (11 Feb 2019 07:00) (16 - 17)  SpO2: 98% (11 Feb 2019 07:00) (96% - 100%)    PHYSICAL EXAM:  General: [x ] non-toxic  HEAD/EYES: [ ] PERRL [ ] white sclera [ ] icterus  ENT:  [ ] normal [x ] supple [ ] thrush [ ] pharyngeal exudate  Cardiovascular:   [ ] murmur [x ] normal [ ] PPM/AICD  Respiratory:  [ x] + wheeze, course rhonchi  GI:  [x ] soft, non-tender, normal bowel sounds  :  [ ] francisco [x ] no CVA tenderness   Musculoskeletal:  [ x] no synovitis  Neurologic:  [x ] non-focal exam   Skin:  x[ ] no rash  Lymph: [x ] no lymphadenopathy  Psychiatric:  [ ] appropriate affect x[ ] alert & oriented  Lines:  [ x] no phlebitis [ ] central line                                10.3   8.60  )-----------( 213      ( 11 Feb 2019 06:54 )             33.9       02-11    136  |  102  |  23  ----------------------------<  66<L>  4.5   |  24  |  0.93    Ca    9.3      11 Feb 2019 06:54  Phos  3.1     02-11  Mg     2.0     02-11    TPro  7.0  /  Alb  3.6  /  TBili  < 0.2<L>  /  DBili  x   /  AST  67<H>  /  ALT  66<H>  /  AlkPhos  139<H>  02-11          MICROBIOLOGY:Culture - Respiratory:   Normal Respiratory Denise Also Present  GNRID^Gram Neg Joey To Be Identified  QUANTITY OF GROWTH: FEW (02-09-19 @ 17:21)  Culture - Respiratory:   Normal Respiratory Denise Also Present (02-05-19 @ 17:50)      RADIOLOGY:
Follow Up:      Inverval History/ROS:Patient is a 64y old  Female who presents with a chief complaint of Bronchiectasis, Dyspnea on Exertion, Cough and Shortness of Breath (12 Feb 2019 11:38)    C/o wheeze  Still has cough.   No fever    Allergies    No Known Allergies    Intolerances        ANTIMICROBIALS:  piperacillin/tazobactam IVPB. 3.375 every 8 hours  trimethoprim  160 mG/sulfamethoxazole 800 mG 2 every 12 hours      OTHER MEDS:  acetaminophen   Tablet .. 650 milliGRAM(s) Oral every 6 hours PRN  ALBUTerol/ipratropium for Nebulization 3 milliLiter(s) Nebulizer every 6 hours  aluminum hydroxide/magnesium hydroxide/simethicone Suspension 30 milliLiter(s) Oral every 4 hours PRN  amLODIPine   Tablet 5 milliGRAM(s) Oral daily  Biotene Dry Mouth Oral Rinse 5 milliLiter(s) Swish and Spit every 4 hours PRN  calcium carbonate 1250 mG  + Vitamin D (OsCal 500 + D) 1 Tablet(s) Oral two times a day  cyanocobalamin 1000 MICROGram(s) Oral daily  dextrose 40% Gel 15 Gram(s) Oral once PRN  dextrose 5%. 1000 milliLiter(s) IV Continuous <Continuous>  dextrose 50% Injectable 12.5 Gram(s) IV Push once  dextrose 50% Injectable 25 Gram(s) IV Push once  dextrose 50% Injectable 25 Gram(s) IV Push once  docusate sodium 100 milliGRAM(s) Oral daily  docusate sodium 100 milliGRAM(s) Oral two times a day  enoxaparin Injectable 40 milliGRAM(s) SubCutaneous every 24 hours  fluticasone propionate 50 MICROgram(s)/spray Nasal Spray 1 Spray(s) Both Nostrils two times a day  folic acid 1 milliGRAM(s) Oral daily  glucagon  Injectable 1 milliGRAM(s) IntraMuscular once PRN  guaiFENesin   Syrup  (Sugar-Free) 200 milliGRAM(s) Oral every 6 hours PRN  insulin glargine Injectable (LANTUS) 20 Unit(s) SubCutaneous at bedtime  insulin lispro (HumaLOG) corrective regimen sliding scale   SubCutaneous three times a day before meals  insulin lispro (HumaLOG) corrective regimen sliding scale   SubCutaneous at bedtime  losartan 50 milliGRAM(s) Oral daily  melatonin 6 milliGRAM(s) Oral at bedtime PRN  montelukast 10 milliGRAM(s) Oral daily  multivitamin 1 Tablet(s) Oral daily  pantoprazole    Tablet 40 milliGRAM(s) Oral before breakfast  polyethylene glycol 3350 17 Gram(s) Oral daily  predniSONE   Tablet 40 milliGRAM(s) Oral daily  senna 1 Tablet(s) Oral at bedtime  sodium chloride 0.65% Nasal 1 Spray(s) Both Nostrils two times a day PRN      Vital Signs Last 24 Hrs  T(C): 36.4 (12 Feb 2019 05:10), Max: 36.6 (11 Feb 2019 21:01)  T(F): 97.5 (12 Feb 2019 05:10), Max: 97.9 (11 Feb 2019 21:01)  HR: 82 (12 Feb 2019 09:55) (78 - 88)  BP: 116/67 (12 Feb 2019 05:10) (116/67 - 133/69)  BP(mean): --  RR: 17 (12 Feb 2019 05:10) (16 - 17)  SpO2: 97% (12 Feb 2019 09:55) (96% - 100%)    PHYSICAL EXAM:  General: [x ] non-toxic  HEAD/EYES: [ ] PERRL x[ ] white sclera [ ] icterus  ENT:  [ ] normal [ x] supple [ ] thrush [ ] pharyngeal exudate  Cardiovascular:   [ ] murmur [x ] normal [ ] PPM/AICD  Respiratory:  [x ]rhonchi  GI:  [x ] soft, non-tender, normal bowel sounds  :  [ ] francisco [x ] no CVA tenderness   Musculoskeletal:  [x ] no synovitis  Neurologic:  x[ ] non-focal exam   Skin:  [x ] no rash  Lymph: [x ] no lymphadenopathy  Psychiatric:  x[ ] appropriate affect [ ] alert & oriented  Lines:  [ x] no phlebitis [ ] central line  x                              10.3   8.60  )-----------( 213      ( 11 Feb 2019 06:54 )             33.9       02-11    136  |  102  |  23  ----------------------------<  66<L>  4.5   |  24  |  0.93    Ca    9.3      11 Feb 2019 06:54  Phos  3.1     02-11  Mg     2.0     02-11    TPro  7.0  /  Alb  3.6  /  TBili  < 0.2<L>  /  DBili  x   /  AST  67<H>  /  ALT  66<H>  /  AlkPhos  139<H>  02-11          MICROBIOLOGY:Culture - Respiratory:   Normal Respiratory Denise Also Present  CULTURE IN PROGRESS, FURTHER REPORT TO FOLLOW.  PSA^Pseudomonas aeruginosa  QUANTITY OF GROWTH: FEW (02-09-19 @ 17:21)  Culture - Respiratory:   Normal Respiratory Denise Also Present (02-05-19 @ 17:50)      RADIOLOGY:
Follow Up:      Inverval History/ROS:Patient is a 64y old  Female who presents with a chief complaint of Bronchiectasis, Dyspnea on Exertion, Cough and Shortness of Breath (12 Feb 2019 17:37)    No fever. Still notes cough and wheeze- not significantly changed.      Allergies    No Known Allergies    Intolerances        ANTIMICROBIALS:  piperacillin/tazobactam IVPB. 3.375 every 8 hours  trimethoprim  160 mG/sulfamethoxazole 800 mG 2 every 12 hours      OTHER MEDS:  acetaminophen   Tablet .. 650 milliGRAM(s) Oral every 6 hours PRN  ALBUTerol/ipratropium for Nebulization 3 milliLiter(s) Nebulizer every 6 hours  aluminum hydroxide/magnesium hydroxide/simethicone Suspension 30 milliLiter(s) Oral every 4 hours PRN  amLODIPine   Tablet 5 milliGRAM(s) Oral daily  Biotene Dry Mouth Oral Rinse 5 milliLiter(s) Swish and Spit every 4 hours PRN  calcium carbonate 1250 mG  + Vitamin D (OsCal 500 + D) 1 Tablet(s) Oral two times a day  cyanocobalamin 1000 MICROGram(s) Oral daily  dextrose 40% Gel 15 Gram(s) Oral once PRN  dextrose 5%. 1000 milliLiter(s) IV Continuous <Continuous>  dextrose 50% Injectable 12.5 Gram(s) IV Push once  dextrose 50% Injectable 25 Gram(s) IV Push once  dextrose 50% Injectable 25 Gram(s) IV Push once  docusate sodium 100 milliGRAM(s) Oral two times a day  enoxaparin Injectable 40 milliGRAM(s) SubCutaneous every 24 hours  fluticasone propionate 50 MICROgram(s)/spray Nasal Spray 1 Spray(s) Both Nostrils two times a day  folic acid 1 milliGRAM(s) Oral daily  glucagon  Injectable 1 milliGRAM(s) IntraMuscular once PRN  guaiFENesin   Syrup  (Sugar-Free) 200 milliGRAM(s) Oral every 6 hours PRN  insulin glargine Injectable (LANTUS) 20 Unit(s) SubCutaneous at bedtime  insulin lispro (HumaLOG) corrective regimen sliding scale   SubCutaneous three times a day before meals  insulin lispro (HumaLOG) corrective regimen sliding scale   SubCutaneous at bedtime  losartan 50 milliGRAM(s) Oral daily  melatonin 6 milliGRAM(s) Oral at bedtime PRN  montelukast 10 milliGRAM(s) Oral daily  multivitamin 1 Tablet(s) Oral daily  pantoprazole    Tablet 40 milliGRAM(s) Oral before breakfast  polyethylene glycol 3350 17 Gram(s) Oral daily  predniSONE   Tablet 40 milliGRAM(s) Oral daily  senna 1 Tablet(s) Oral at bedtime  sodium chloride 0.65% Nasal 1 Spray(s) Both Nostrils two times a day PRN      Vital Signs Last 24 Hrs  T(C): 36.7 (13 Feb 2019 12:25), Max: 36.7 (13 Feb 2019 12:25)  T(F): 98.1 (13 Feb 2019 12:25), Max: 98.1 (13 Feb 2019 12:25)  HR: 88 (13 Feb 2019 15:52) (80 - 93)  BP: 122/68 (13 Feb 2019 12:25) (118/61 - 135/67)  BP(mean): --  RR: 17 (13 Feb 2019 12:25) (17 - 17)  SpO2: 95% (13 Feb 2019 15:52) (94% - 97%)    PHYSICAL EXAM:  General: [x ] non-toxic  HEAD/EYES: [ ] PERRL [x ] white sclera [ ] icterus  ENT:  [ ] normal [ x] supple [ ] thrush [ ] pharyngeal exudate  Cardiovascular:   [ ] murmur [x ] normal [ ] PPM/AICD  Respiratory:  [x ] rhonchi  GI:  [x ] soft, non-tender, normal bowel sounds  :  [ ] francisco [ ] no CVA tenderness   Musculoskeletal:  [x ] no synovitis  Neurologic:  [x ] non-focal exam   Skin:  x[ ] no rash  Lymph: [ ] no lymphadenopathy  Psychiatric:  [ ] appropriate affect [x ] alert & oriented  Lines:  [ x] no phlebitis [ ] central line                                9.6    7.93  )-----------( 195      ( 13 Feb 2019 06:30 )             31.8       02-13    137  |  101  |  21  ----------------------------<  93  4.4   |  26  |  0.78    Ca    9.1      13 Feb 2019 06:30  Phos  2.7     02-13  Mg     1.9     02-13    TPro  6.5  /  Alb  3.6  /  TBili  < 0.2<L>  /  DBili  < 0.2  /  AST  58<H>  /  ALT  80<H>  /  AlkPhos  128<H>  02-13          MICROBIOLOGY:Culture - Respiratory:   Normal Respiratory Denise Also Present (02-09-19 @ 17:21)      RADIOLOGY:
Interval Events:  Appears well this morning  Sitting in chair saturating well with room air  Called daughter while at bedside  Patient is feeling somewhat improved    REVIEW OF SYSTEMS:  [x] All other systems negative  [ ] Unable to assess ROS because ________    OBJECTIVE:  ICU Vital Signs Last 24 Hrs  T(C): 36.6 (11 Feb 2019 13:39), Max: 36.7 (10 Feb 2019 22:03)  T(F): 97.8 (11 Feb 2019 13:39), Max: 98.1 (10 Feb 2019 22:03)  HR: 82 (11 Feb 2019 16:21) (69 - 90)  BP: 123/68 (11 Feb 2019 13:39) (110/60 - 123/68)  BP(mean): --  ABP: --  ABP(mean): --  RR: 17 (11 Feb 2019 13:39) (17 - 17)  SpO2: 99% (11 Feb 2019 13:39) (97% - 100%)        02-10 @ 07:01  -  02-11 @ 07:00  --------------------------------------------------------  IN: 1750 mL / OUT: 400 mL / NET: 1350 mL      CAPILLARY BLOOD GLUCOSE      POCT Blood Glucose.: 333 mg/dL (11 Feb 2019 17:13)      PHYSICAL EXAM:  General: Awake, alert, oriented X 3.   HEENT: Atraumatic, normocephalic.   Respiratory: Normal chest expansion. B/l rhoncherous BS.  Cardiovascular: S1 S2 normal. No murmurs, rubs or gallops.   Abdomen: Soft, non-tender, non-distended. No organomegaly.  Extremities: Warm to touch. Peripheral pulse palpable. No pedal edema.   Skin: No rashes or skin lesions  Neurological: Motor and sensory examination equal and normal in all four extremities.  Psychiatry: Appropriate mood and affect.    HOSPITAL MEDICATIONS:  enoxaparin Injectable 40 milliGRAM(s) SubCutaneous every 24 hours    piperacillin/tazobactam IVPB. 3.375 Gram(s) IV Intermittent every 8 hours  trimethoprim  160 mG/sulfamethoxazole 800 mG 2 Tablet(s) Oral every 12 hours    amLODIPine   Tablet 5 milliGRAM(s) Oral daily  losartan 50 milliGRAM(s) Oral daily    dextrose 40% Gel 15 Gram(s) Oral once PRN  dextrose 50% Injectable 12.5 Gram(s) IV Push once  dextrose 50% Injectable 25 Gram(s) IV Push once  dextrose 50% Injectable 25 Gram(s) IV Push once  glucagon  Injectable 1 milliGRAM(s) IntraMuscular once PRN  insulin glargine Injectable (LANTUS) 20 Unit(s) SubCutaneous at bedtime  insulin lispro (HumaLOG) corrective regimen sliding scale   SubCutaneous three times a day before meals  insulin lispro (HumaLOG) corrective regimen sliding scale   SubCutaneous at bedtime  predniSONE   Tablet 40 milliGRAM(s) Oral daily    ALBUTerol/ipratropium for Nebulization 3 milliLiter(s) Nebulizer every 6 hours  guaiFENesin   Syrup  (Sugar-Free) 200 milliGRAM(s) Oral every 6 hours PRN  montelukast 10 milliGRAM(s) Oral daily    acetaminophen   Tablet .. 650 milliGRAM(s) Oral every 6 hours PRN  melatonin 6 milliGRAM(s) Oral at bedtime PRN    aluminum hydroxide/magnesium hydroxide/simethicone Suspension 30 milliLiter(s) Oral every 4 hours PRN  docusate sodium 100 milliGRAM(s) Oral daily  docusate sodium 100 milliGRAM(s) Oral two times a day  pantoprazole    Tablet 40 milliGRAM(s) Oral before breakfast  senna 1 Tablet(s) Oral daily  senna 1 Tablet(s) Oral at bedtime        calcium carbonate 1250 mG  + Vitamin D (OsCal 500 + D) 1 Tablet(s) Oral two times a day  cyanocobalamin 1000 MICROGram(s) Oral daily  dextrose 5%. 1000 milliLiter(s) IV Continuous <Continuous>  folic acid 1 milliGRAM(s) Oral daily  multivitamin 1 Tablet(s) Oral daily  sodium chloride 0.9%. 1000 milliLiter(s) IV Continuous <Continuous>      Biotene Dry Mouth Oral Rinse 5 milliLiter(s) Swish and Spit every 4 hours PRN  fluticasone propionate 50 MICROgram(s)/spray Nasal Spray 1 Spray(s) Both Nostrils two times a day  sodium chloride 0.65% Nasal 1 Spray(s) Both Nostrils two times a day PRN        LABS:                        10.3   8.60  )-----------( 213      ( 11 Feb 2019 06:54 )             33.9     Hgb Trend: 10.3<--, 10.1<--, 10.1<--, 10.9<--, 11.0<--  02-11    136  |  102  |  23  ----------------------------<  66<L>  4.5   |  24  |  0.93    Ca    9.3      11 Feb 2019 06:54  Phos  3.1     02-11  Mg     2.0     02-11    TPro  7.0  /  Alb  3.6  /  TBili  < 0.2<L>  /  DBili  x   /  AST  67<H>  /  ALT  66<H>  /  AlkPhos  139<H>  02-11    Creatinine Trend: 0.93<--, 0.90<--, 0.84<--, 0.72<--, 0.85<--, 0.66<--        MICROBIOLOGY:     RADIOLOGY:  [ ] Reviewed and interpreted by me    ASSESSMENT AND RECOMMENDATION:    63 F with history of HTN, DM2, bronchiectasis (cystic) with a primary ciliary dyskinesia gene variant, allergic bronchopulmonary aspergillosis, stenotrophomonas colonization and chronic cough, presented with worsening shortness of breath. Patient also had a previous AFB culture, which was positive, but not TB. CT sinuses with significant disease. CXR grossly unchanged from prior exam. She is afebrile and has no leukocytosis. AFB have been negative x 3 on this admission.    On Zosyn for pseudomonas and Bactrim for S maltophilia  ID following - recommend considering to d/c zosyn after tomorrow which is reasonable  On 5 day course of prednisone for wheezing which has improved  Airway clearance - will need to approve her for chest vest   Continue flutter valve/aerobika device  Flonase for post nasal drip  Bronchodilators  OOB to chair, ambulation    Rosas Nieto MD  Pulmonary and Critical Care Fellow  [phone]
Interval Events: Patient seen and examined at the bedside.  Complaining of some weakness. Sounds more congested. Also complains of constipation.     REVIEW OF SYSTEMS:  Constitutional: + fevers or chills. No weight loss. + fatigue or generalised malaise.  Eyes: No itching or discharge from the eyes  ENT: No ear pain. No ear discharge. ++ nasal congestion. + post nasal drip. No epistaxis. No throat pain. No sore throat. No difficulty swallowing.   CV: No chest pain. No palpitations. No lightheadedness or dizziness.   Resp: + dyspnea at rest. + dyspnea on exertion. No orthopnea. No wheezing. + cough. No stridor. + sputum production. No chest pain with respiration.  GI: No nausea. No vomiting. No diarrhea.  MSK: No joint pain or pain in any extremities  Integumentary: No skin lesions. No pedal edema.  Neurological: No gross motor weakness. No sensory changes.    [X ] All other systems negative    OBJECTIVE:  ICU Vital Signs Last 24 Hrs  T(C): 36.9 (07 Feb 2019 14:04), Max: 37.1 (07 Feb 2019 05:54)  T(F): 98.4 (07 Feb 2019 14:04), Max: 98.7 (07 Feb 2019 05:54)  HR: 93 (07 Feb 2019 14:04) (82 - 95)  BP: 107/60 (07 Feb 2019 14:04) (107/60 - 132/67)  RR: 17 (07 Feb 2019 14:04) (17 - 17)  SpO2: 95% (07 Feb 2019 14:04) (94% - 97%)      POCT Blood Glucose.: 245 mg/dL (04 Feb 2019 21:27)    PHYSICAL EXAM:  General: Awake, alert, oriented X 3.   HEENT: Atraumatic, normocephalic.                 Mallampatti Grade 2   Lymph Nodes: No palpable lymphadenopathy  Neck: No JVD. No carotid bruit.   Respiratory: Normal chest expansion                         B/l rhoncherous.   Cardiovascular: S1 S2 normal. No murmurs, rubs or gallops.   Abdomen: Soft, non-tender, non-distended. No organomegaly.  Extremities: Warm to touch. Peripheral pulse palpable. No pedal edema.   Skin: No rashes or skin lesions  Neurological: Motor and sensory examination equal and normal in all four extremities.  Psychiatry: Appropriate mood and affect.    HOSPITAL MEDICATIONS:  MEDICATIONS  (STANDING):  ALBUTerol/ipratropium for Nebulization 3 milliLiter(s) Nebulizer every 6 hours  amLODIPine   Tablet 5 milliGRAM(s) Oral daily  calcium carbonate 1250 mG  + Vitamin D (OsCal 500 + D) 1 Tablet(s) Oral two times a day  cyanocobalamin 1000 MICROGram(s) Oral daily  dextrose 5%. 1000 milliLiter(s) (50 mL/Hr) IV Continuous <Continuous>  dextrose 50% Injectable 12.5 Gram(s) IV Push once  dextrose 50% Injectable 25 Gram(s) IV Push once  dextrose 50% Injectable 25 Gram(s) IV Push once  docusate sodium 100 milliGRAM(s) Oral two times a day  enoxaparin Injectable 40 milliGRAM(s) SubCutaneous every 24 hours  fluticasone propionate 50 MICROgram(s)/spray Nasal Spray 1 Spray(s) Both Nostrils two times a day  folic acid 1 milliGRAM(s) Oral daily  insulin glargine Injectable (LANTUS) 20 Unit(s) SubCutaneous at bedtime  insulin lispro (HumaLOG) corrective regimen sliding scale   SubCutaneous three times a day before meals  insulin lispro (HumaLOG) corrective regimen sliding scale   SubCutaneous at bedtime  levoFLOXacin IVPB 750 milliGRAM(s) IV Intermittent every 24 hours  levoFLOXacin IVPB      losartan 50 milliGRAM(s) Oral daily  montelukast 10 milliGRAM(s) Oral daily  multivitamin 1 Tablet(s) Oral daily  pantoprazole    Tablet 40 milliGRAM(s) Oral before breakfast  senna 1 Tablet(s) Oral at bedtime  sodium chloride 0.9%. 1000 milliLiter(s) (75 mL/Hr) IV Continuous <Continuous>  trimethoprim  160 mG/sulfamethoxazole 800 mG 2 Tablet(s) Oral every 12 hours    MEDICATIONS  (PRN):  acetaminophen   Tablet .. 650 milliGRAM(s) Oral every 6 hours PRN Moderate Pain (4 - 6)  aluminum hydroxide/magnesium hydroxide/simethicone Suspension 30 milliLiter(s) Oral every 4 hours PRN Dyspepsia  Biotene Dry Mouth Oral Rinse 5 milliLiter(s) Swish and Spit every 4 hours PRN Mouth Care/Dryness  dextrose 40% Gel 15 Gram(s) Oral once PRN Blood Glucose LESS THAN 70 milliGRAM(s)/deciliter  glucagon  Injectable 1 milliGRAM(s) IntraMuscular once PRN Glucose LESS THAN 70 milligrams/deciliter  guaiFENesin   Syrup  (Sugar-Free) 200 milliGRAM(s) Oral every 6 hours PRN Cough  sodium chloride 0.65% Nasal 1 Spray(s) Both Nostrils two times a day PRN Nasal Congestion    trimethoprim  160 mG/sulfamethoxazole 800 mG 2 Tablet(s) Oral every 12 hours    LABS:                                 10.9   10.41 )-----------( 273      ( 07 Feb 2019 05:11 )             35.5     02-07    136  |  101  |  18  ----------------------------<  114<H>  4.0   |  22  |  0.72    Ca    8.9      07 Feb 2019 05:11  Phos  2.8     02-07  Mg     2.0     02-07    TPro  7.0  /  Alb  3.6  /  TBili  < 0.2<L>  /  DBili  x   /  AST  34<H>  /  ALT  41<H>  /  AlkPhos  138<H>  02-07    RADIOLOGY:  [X] Reviewed and interpreted by me - No new focal findings.
Interval Events: Patient seen and examined at the bedside. Still complaining of some weakness,. Still has wheezing. The pseudomonas was resistant to levaquin and the patient was switched to zosyn but ID.     REVIEW OF SYSTEMS:  Constitutional: + fevers or chills. No weight loss. + fatigue or generalised malaise.  Eyes: No itching or discharge from the eyes  ENT: No ear pain. No ear discharge. ++ nasal congestion. + post nasal drip. No epistaxis. No throat pain. No sore throat. No difficulty swallowing.   CV: No chest pain. No palpitations. No lightheadedness or dizziness.   Resp: + dyspnea at rest. + dyspnea on exertion. No orthopnea. No wheezing. + cough. No stridor. + sputum production. No chest pain with respiration.  GI: No nausea. No vomiting. No diarrhea.  MSK: No joint pain or pain in any extremities  Integumentary: No skin lesions. No pedal edema.  Neurological: No gross motor weakness. No sensory changes.    [X ] All other systems negative    OBJECTIVE:  ICU Vital Signs Last 24 Hrs  T(C): 36.3 (08 Feb 2019 12:18), Max: 36.7 (08 Feb 2019 05:50)  T(F): 97.4 (08 Feb 2019 12:18), Max: 98.1 (08 Feb 2019 05:50)  HR: 82 (08 Feb 2019 15:58) (80 - 88)  BP: 104/57 (08 Feb 2019 12:18) (104/57 - 143/67)  RR: 18 (08 Feb 2019 12:18) (17 - 18)  SpO2: 94% (08 Feb 2019 12:18) (94% - 97%)    POCT Blood Glucose.: 245 mg/dL (04 Feb 2019 21:27)    PHYSICAL EXAM:  General: Awake, alert, oriented X 3.   HEENT: Atraumatic, normocephalic.                 Mallampatti Grade 2   Lymph Nodes: No palpable lymphadenopathy  Neck: No JVD. No carotid bruit.   Respiratory: Normal chest expansion                         B/l rhoncherous.   Cardiovascular: S1 S2 normal. No murmurs, rubs or gallops.   Abdomen: Soft, non-tender, non-distended. No organomegaly.  Extremities: Warm to touch. Peripheral pulse palpable. No pedal edema.   Skin: No rashes or skin lesions  Neurological: Motor and sensory examination equal and normal in all four extremities.  Psychiatry: Appropriate mood and affect.    HOSPITAL MEDICATIONS:      LABS:                                 10.9   10.41 )-----------( 273      ( 07 Feb 2019 05:11 )             35.5     02-07    136  |  101  |  18  ----------------------------<  114<H>  4.0   |  22  |  0.72    Ca    8.9      07 Feb 2019 05:11  Phos  2.8     02-07  Mg     2.0     02-07    TPro  7.0  /  Alb  3.6  /  TBili  < 0.2<L>  /  DBili  x   /  AST  34<H>  /  ALT  41<H>  /  AlkPhos  138<H>  02-07    RADIOLOGY:  [X] Reviewed and interpreted by me - No new focal findings.
Patient is a 63y old  Female who presents with a chief complaint of Bronchiectasis, Dyspnea on Exertion, Cough and Shortness of Breath (05 Feb 2019 17:31)      SUBJECTIVE / OVERNIGHT EVENTS:    Events noted.  C/o Cough  No N/V    MEDICATIONS  (STANDING):  ALBUTerol/ipratropium for Nebulization 3 milliLiter(s) Nebulizer every 6 hours  amLODIPine   Tablet 5 milliGRAM(s) Oral daily  calcium carbonate 1250 mG  + Vitamin D (OsCal 500 + D) 1 Tablet(s) Oral two times a day  cyanocobalamin 1000 MICROGram(s) Oral daily  dextrose 5%. 1000 milliLiter(s) (50 mL/Hr) IV Continuous <Continuous>  dextrose 50% Injectable 12.5 Gram(s) IV Push once  dextrose 50% Injectable 25 Gram(s) IV Push once  dextrose 50% Injectable 25 Gram(s) IV Push once  docusate sodium 100 milliGRAM(s) Oral two times a day  enoxaparin Injectable 40 milliGRAM(s) SubCutaneous every 24 hours  fluticasone propionate 50 MICROgram(s)/spray Nasal Spray 1 Spray(s) Both Nostrils two times a day  folic acid 1 milliGRAM(s) Oral daily  insulin glargine Injectable (LANTUS) 20 Unit(s) SubCutaneous at bedtime  insulin lispro (HumaLOG) corrective regimen sliding scale   SubCutaneous three times a day before meals  insulin lispro (HumaLOG) corrective regimen sliding scale   SubCutaneous at bedtime  levoFLOXacin IVPB 750 milliGRAM(s) IV Intermittent every 24 hours  levoFLOXacin IVPB      losartan 50 milliGRAM(s) Oral daily  montelukast 10 milliGRAM(s) Oral daily  multivitamin 1 Tablet(s) Oral daily  senna 1 Tablet(s) Oral at bedtime  sodium chloride 0.9%. 1000 milliLiter(s) (75 mL/Hr) IV Continuous <Continuous>  trimethoprim  160 mG/sulfamethoxazole 800 mG 2 Tablet(s) Oral every 12 hours    MEDICATIONS  (PRN):  acetaminophen   Tablet .. 650 milliGRAM(s) Oral every 6 hours PRN Moderate Pain (4 - 6)  Biotene Dry Mouth Oral Rinse 5 milliLiter(s) Swish and Spit every 4 hours PRN Mouth Care/Dryness  dextrose 40% Gel 15 Gram(s) Oral once PRN Blood Glucose LESS THAN 70 milliGRAM(s)/deciliter  glucagon  Injectable 1 milliGRAM(s) IntraMuscular once PRN Glucose LESS THAN 70 milligrams/deciliter  guaiFENesin   Syrup  (Sugar-Free) 200 milliGRAM(s) Oral every 6 hours PRN Cough  sodium chloride 0.65% Nasal 1 Spray(s) Both Nostrils two times a day PRN Nasal Congestion        CAPILLARY BLOOD GLUCOSE      POCT Blood Glucose.: 104 mg/dL (05 Feb 2019 21:33)  POCT Blood Glucose.: 171 mg/dL (05 Feb 2019 17:15)  POCT Blood Glucose.: 222 mg/dL (05 Feb 2019 11:49)  POCT Blood Glucose.: 291 mg/dL (05 Feb 2019 08:24)    I&O's Summary    04 Feb 2019 07:01  -  05 Feb 2019 07:00  --------------------------------------------------------  IN: 1850 mL / OUT: 750 mL / NET: 1100 mL    05 Feb 2019 07:01  -  05 Feb 2019 22:02  --------------------------------------------------------  IN: 1100 mL / OUT: 0 mL / NET: 1100 mL        PHYSICAL EXAM:  GENERAL: NAD  NECK: Supple, No JVD  CHEST/LUNG: Clear to auscultation bilaterally; BL Basilar crackles  HEART: Regular rate and rhythm; No murmurs, rubs, or gallops  ABDOMEN: Soft, Nontender, Nondistended; Bowel sounds present  EXTREMITIES:   No clubbing, cyanosis, or edema  NEUROLOGY: AAO       LABS:                        9.7    9.93  )-----------( 227      ( 05 Feb 2019 06:00 )             31.4     02-05    133<L>  |  100  |  21  ----------------------------<  362<H>  4.2   |  23  |  0.66    Ca    9.2      05 Feb 2019 06:00  Phos  3.0     02-05  Mg     1.9     02-05    TPro  6.7  /  Alb  3.5  /  TBili  0.2  /  DBili  x   /  AST  36<H>  /  ALT  44<H>  /  AlkPhos  153<H>  02-05            CAPILLARY BLOOD GLUCOSE      POCT Blood Glucose.: 104 mg/dL (05 Feb 2019 21:33)  POCT Blood Glucose.: 171 mg/dL (05 Feb 2019 17:15)  POCT Blood Glucose.: 222 mg/dL (05 Feb 2019 11:49)  POCT Blood Glucose.: 291 mg/dL (05 Feb 2019 08:24)    02-05 @ 17:50  Culture-urine --  Culture results --  method type --  Organism --  Organism Identification --  Specimen source SPUTUM  02-05 @ 05:43  Culture-urine --  Culture results --  method type --  Organism --  Organism Identification --  Specimen source SPUTUM  02-04 @ 15:33  Culture-urine --  Culture results --  method type --  Organism --  Organism Identification --  Specimen source SPUTUM  02-03 @ 22:38  Culture-urine   AFTER 24 HOURS INCUBATION  CALB^Candida albicans  COLONY COUNT: LESS THAN 10,000 CFU/ML  Culture results --  method type --  Organism --  Organism Identification --  Specimen source URINE MIDSTREAM  02-03 @ 20:01  Culture-urine --  Culture results --  method type --  Organism --  Organism Identification --  Specimen source BLOOD PERIPHERAL  02-03 @ 19:59  Culture-urine --  Culture results --  method type --  Organism --  Organism Identification --  Specimen source BLOOD VENOUS      02-05 @ 17:50  Culture-CSF --  Culture results --  Gram stain --  Gram stain spinal fluid --  Method type --  Organism --  Organism identification --  Specimen source SPUTUM  02-05 @ 05:43  Culture-CSF --  Culture results --  Gram stain --  Gram stain spinal fluid --  Method type --  Organism --  Organism identification --  Specimen source SPUTUM       02-05 @ 17:50  Culture blood --  Culture results --  Gram stain --  Gram stain blood --  Method type --  Organism --  Organism identification --  Specimen source SPUTUM   02-05 @ 05:43  Culture blood --  Culture results --  Gram stain --  Gram stain blood --  Method type --  Organism --  Organism identification --  Specimen source SPUTUM   02-04 @ 15:33  Culture blood --  Culture results --  Gram stain --  Gram stain blood --  Method type --  Organism --  Organism identification --  Specimen source SPUTUM   02-03 @ 22:38  Culture blood --  Culture results --  Gram stain --  Gram stain blood --  Method type --  Organism --  Organism identification --  Specimen source URINE MIDSTREAM   02-03 @ 20:01  Culture blood   NO ORGANISMS ISOLATED  NO ORGANISMS ISOLATED AT 48 HRS.  Culture results --  Gram stain --  Gram stain blood --  Method type --  Organism --  Organism identification --  Specimen source BLOOD PERIPHERAL   02-03 @ 19:59  Culture blood   NO ORGANISMS ISOLATED  NO ORGANISMS ISOLATED AT 48 HRS.  Culture results --  Gram stain --  Gram stain blood --  Method type --  Organism --  Organism identification --  Specimen source BLOOD VENOUS      RADIOLOGY & ADDITIONAL TESTS:    Imaging Personally Reviewed:    Consultant(s) Notes Reviewed:      Care Discussed with Consultants/Other Providers:
Patient is a 63y old  Female who presents with a chief complaint of Bronchiectasis, Dyspnea on Exertion, Cough and Shortness of Breath (06 Feb 2019 11:12)      SUBJECTIVE / OVERNIGHT EVENTS:    Events noted.  C/o Cough/No N/V    MEDICATIONS  (STANDING):  ALBUTerol/ipratropium for Nebulization 3 milliLiter(s) Nebulizer every 6 hours  amLODIPine   Tablet 5 milliGRAM(s) Oral daily  calcium carbonate 1250 mG  + Vitamin D (OsCal 500 + D) 1 Tablet(s) Oral two times a day  cyanocobalamin 1000 MICROGram(s) Oral daily  dextrose 5%. 1000 milliLiter(s) (50 mL/Hr) IV Continuous <Continuous>  dextrose 50% Injectable 12.5 Gram(s) IV Push once  dextrose 50% Injectable 25 Gram(s) IV Push once  dextrose 50% Injectable 25 Gram(s) IV Push once  docusate sodium 100 milliGRAM(s) Oral two times a day  enoxaparin Injectable 40 milliGRAM(s) SubCutaneous every 24 hours  fluticasone propionate 50 MICROgram(s)/spray Nasal Spray 1 Spray(s) Both Nostrils two times a day  folic acid 1 milliGRAM(s) Oral daily  insulin glargine Injectable (LANTUS) 20 Unit(s) SubCutaneous at bedtime  insulin lispro (HumaLOG) corrective regimen sliding scale   SubCutaneous three times a day before meals  insulin lispro (HumaLOG) corrective regimen sliding scale   SubCutaneous at bedtime  levoFLOXacin IVPB 750 milliGRAM(s) IV Intermittent every 24 hours  levoFLOXacin IVPB      losartan 50 milliGRAM(s) Oral daily  montelukast 10 milliGRAM(s) Oral daily  multivitamin 1 Tablet(s) Oral daily  senna 1 Tablet(s) Oral at bedtime  sodium chloride 0.9%. 1000 milliLiter(s) (75 mL/Hr) IV Continuous <Continuous>  trimethoprim  160 mG/sulfamethoxazole 800 mG 2 Tablet(s) Oral every 12 hours    MEDICATIONS  (PRN):  acetaminophen   Tablet .. 650 milliGRAM(s) Oral every 6 hours PRN Moderate Pain (4 - 6)  Biotene Dry Mouth Oral Rinse 5 milliLiter(s) Swish and Spit every 4 hours PRN Mouth Care/Dryness  dextrose 40% Gel 15 Gram(s) Oral once PRN Blood Glucose LESS THAN 70 milliGRAM(s)/deciliter  glucagon  Injectable 1 milliGRAM(s) IntraMuscular once PRN Glucose LESS THAN 70 milligrams/deciliter  guaiFENesin   Syrup  (Sugar-Free) 200 milliGRAM(s) Oral every 6 hours PRN Cough  sodium chloride 0.65% Nasal 1 Spray(s) Both Nostrils two times a day PRN Nasal Congestion        CAPILLARY BLOOD GLUCOSE      POCT Blood Glucose.: 148 mg/dL (06 Feb 2019 22:40)  POCT Blood Glucose.: 129 mg/dL (06 Feb 2019 17:13)  POCT Blood Glucose.: 109 mg/dL (06 Feb 2019 12:12)  POCT Blood Glucose.: 195 mg/dL (06 Feb 2019 07:48)  POCT Blood Glucose.: 181 mg/dL (06 Feb 2019 06:34)    I&O's Summary    05 Feb 2019 07:01  -  06 Feb 2019 07:00  --------------------------------------------------------  IN: 1650 mL / OUT: 0 mL / NET: 1650 mL    06 Feb 2019 07:01  -  07 Feb 2019 00:01  --------------------------------------------------------  IN: 1480 mL / OUT: 300 mL / NET: 1180 mL        PHYSICAL EXAM:  GENERAL: NAD  NECK: Supple, No JVD  CHEST/LUNG: Clear to auscultation bilaterally; Bibasilar crackles  HEART: Regular rate and rhythm; No murmurs, rubs, or gallops  ABDOMEN: Soft, Nontender, Nondistended; Bowel sounds present  EXTREMITIES:   No clubbing, cyanosis, or edema  NEUROLOGY: AAO       LABS:                        11.0   11.62 )-----------( 235      ( 06 Feb 2019 05:30 )             36.6     02-06    137  |  103  |  25<H>  ----------------------------<  79  3.8   |  24  |  0.85    Ca    9.1      06 Feb 2019 05:30  Phos  2.8     02-06  Mg     1.9     02-06    TPro  7.0  /  Alb  3.5  /  TBili  0.3  /  DBili  x   /  AST  30  /  ALT  44<H>  /  AlkPhos  143<H>  02-06            CAPILLARY BLOOD GLUCOSE      POCT Blood Glucose.: 148 mg/dL (06 Feb 2019 22:40)  POCT Blood Glucose.: 129 mg/dL (06 Feb 2019 17:13)  POCT Blood Glucose.: 109 mg/dL (06 Feb 2019 12:12)  POCT Blood Glucose.: 195 mg/dL (06 Feb 2019 07:48)  POCT Blood Glucose.: 181 mg/dL (06 Feb 2019 06:34)    02-05 @ 17:50  Culture-urine --  Culture results --  method type --  Organism --  Organism Identification --  Specimen source SPUTUM  02-05 @ 05:43  Culture-urine --  Culture results --  method type --  Organism --  Organism Identification --  Specimen source SPUTUM  02-04 @ 15:33  Culture-urine --  Culture results --  method type --  Organism --  Organism Identification --  Specimen source SPUTUM  02-03 @ 22:38  Culture-urine   AFTER 24 HOURS INCUBATION  CALB^Candida albicans  COLONY COUNT: LESS THAN 10,000 CFU/ML  Culture results --  method type --  Organism --  Organism Identification --  Specimen source URINE MIDSTREAM  02-03 @ 20:01  Culture-urine --  Culture results --  method type --  Organism --  Organism Identification --  Specimen source BLOOD PERIPHERAL  02-03 @ 19:59  Culture-urine --  Culture results --  method type --  Organism --  Organism Identification --  Specimen source BLOOD VENOUS           02-05 @ 17:50  Culture blood --  Culture results --  Gram stain --  Gram stain blood --  Method type --  Organism --  Organism identification --  Specimen source SPUTUM   02-05 @ 05:43  Culture blood --  Culture results --  Gram stain --  Gram stain blood --  Method type --  Organism --  Organism identification --  Specimen source SPUTUM   02-04 @ 15:33  Culture blood --  Culture results --  Gram stain --  Gram stain blood --  Method type --  Organism --  Organism identification --  Specimen source SPUTUM   02-03 @ 22:38  Culture blood --  Culture results --  Gram stain --  Gram stain blood --  Method type --  Organism --  Organism identification --  Specimen source URINE MIDSTREAM   02-03 @ 20:01  Culture blood   NO ORGANISMS ISOLATED  NO ORGANISMS ISOLATED AT 72 HRS.  Culture results --  Gram stain --  Gram stain blood --  Method type --  Organism --  Organism identification --  Specimen source BLOOD PERIPHERAL   02-03 @ 19:59  Culture blood   NO ORGANISMS ISOLATED  NO ORGANISMS ISOLATED AT 72 HRS.  Culture results --  Gram stain --  Gram stain blood --  Method type --  Organism --  Organism identification --  Specimen source BLOOD VENOUS      RADIOLOGY & ADDITIONAL TESTS:    Imaging Personally Reviewed:    Consultant(s) Notes Reviewed:      Care Discussed with Consultants/Other Providers:
Patient is a 63y old  Female who presents with a chief complaint of Bronchiectasis, Dyspnea on Exertion, Cough and Shortness of Breath (08 Feb 2019 16:49)      SUBJECTIVE / OVERNIGHT EVENTS:    Events noted.  Cough  CARDIOVASCULAR: No chest pain, palpitations, dizziness, or leg swelling  GASTROINTESTINAL: No abdominal or epigastric pain. No nausea, vomiting, or hematemesis; No diarrhea or constipation.   NEUROLOGICAL: No headaches,     MEDICATIONS  (STANDING):  ALBUTerol/ipratropium for Nebulization 3 milliLiter(s) Nebulizer every 6 hours  amLODIPine   Tablet 5 milliGRAM(s) Oral daily  calcium carbonate 1250 mG  + Vitamin D (OsCal 500 + D) 1 Tablet(s) Oral two times a day  cyanocobalamin 1000 MICROGram(s) Oral daily  dextrose 5%. 1000 milliLiter(s) (50 mL/Hr) IV Continuous <Continuous>  dextrose 50% Injectable 12.5 Gram(s) IV Push once  dextrose 50% Injectable 25 Gram(s) IV Push once  dextrose 50% Injectable 25 Gram(s) IV Push once  docusate sodium 100 milliGRAM(s) Oral daily  docusate sodium 100 milliGRAM(s) Oral two times a day  enoxaparin Injectable 40 milliGRAM(s) SubCutaneous every 24 hours  fluticasone propionate 50 MICROgram(s)/spray Nasal Spray 1 Spray(s) Both Nostrils two times a day  folic acid 1 milliGRAM(s) Oral daily  insulin glargine Injectable (LANTUS) 20 Unit(s) SubCutaneous at bedtime  insulin lispro (HumaLOG) corrective regimen sliding scale   SubCutaneous three times a day before meals  insulin lispro (HumaLOG) corrective regimen sliding scale   SubCutaneous at bedtime  losartan 50 milliGRAM(s) Oral daily  montelukast 10 milliGRAM(s) Oral daily  multivitamin 1 Tablet(s) Oral daily  pantoprazole    Tablet 40 milliGRAM(s) Oral before breakfast  piperacillin/tazobactam IVPB. 3.375 Gram(s) IV Intermittent every 8 hours  senna 1 Tablet(s) Oral daily  senna 1 Tablet(s) Oral at bedtime  sodium chloride 0.9%. 1000 milliLiter(s) (75 mL/Hr) IV Continuous <Continuous>  trimethoprim  160 mG/sulfamethoxazole 800 mG 2 Tablet(s) Oral every 12 hours    MEDICATIONS  (PRN):  acetaminophen   Tablet .. 650 milliGRAM(s) Oral every 6 hours PRN Moderate Pain (4 - 6)  aluminum hydroxide/magnesium hydroxide/simethicone Suspension 30 milliLiter(s) Oral every 4 hours PRN Dyspepsia  Biotene Dry Mouth Oral Rinse 5 milliLiter(s) Swish and Spit every 4 hours PRN Mouth Care/Dryness  dextrose 40% Gel 15 Gram(s) Oral once PRN Blood Glucose LESS THAN 70 milliGRAM(s)/deciliter  glucagon  Injectable 1 milliGRAM(s) IntraMuscular once PRN Glucose LESS THAN 70 milligrams/deciliter  guaiFENesin   Syrup  (Sugar-Free) 200 milliGRAM(s) Oral every 6 hours PRN Cough  sodium chloride 0.65% Nasal 1 Spray(s) Both Nostrils two times a day PRN Nasal Congestion        CAPILLARY BLOOD GLUCOSE      POCT Blood Glucose.: 144 mg/dL (08 Feb 2019 21:29)  POCT Blood Glucose.: 186 mg/dL (08 Feb 2019 17:21)  POCT Blood Glucose.: 135 mg/dL (08 Feb 2019 12:01)  POCT Blood Glucose.: 264 mg/dL (08 Feb 2019 07:40)    I&O's Summary    07 Feb 2019 07:01  -  08 Feb 2019 07:00  --------------------------------------------------------  IN: 1430 mL / OUT: 0 mL / NET: 1430 mL    08 Feb 2019 07:01  -  08 Feb 2019 21:52  --------------------------------------------------------  IN: 600 mL / OUT: 0 mL / NET: 600 mL        PHYSICAL EXAM:  GENERAL: NAD  NECK: Supple, No JVD  CHEST/LUNG: Clear to auscultation bilaterally; Bibasilar crackles  HEART: Regular rate and rhythm; No murmurs, rubs, or gallops  ABDOMEN: Soft, Nontender, Nondistended; Bowel sounds present  EXTREMITIES:   No clubbing, cyanosis, or edema  NEUROLOGY: AAO       LABS:                        10.1   9.19  )-----------( 244      ( 08 Feb 2019 05:59 )             32.8     02-08    138  |  102  |  23  ----------------------------<  84  4.3   |  22  |  0.84    Ca    9.1      08 Feb 2019 05:54  Phos  3.2     02-08  Mg     1.9     02-08    TPro  6.7  /  Alb  3.5  /  TBili  < 0.2<L>  /  DBili  x   /  AST  24  /  ALT  32  /  AlkPhos  130<H>  02-08            CAPILLARY BLOOD GLUCOSE      POCT Blood Glucose.: 144 mg/dL (08 Feb 2019 21:29)  POCT Blood Glucose.: 186 mg/dL (08 Feb 2019 17:21)  POCT Blood Glucose.: 135 mg/dL (08 Feb 2019 12:01)  POCT Blood Glucose.: 264 mg/dL (08 Feb 2019 07:40)    02-06 @ 17:32  Culture-urine --  Culture results --  method type --  Organism --  Organism Identification --  Specimen source SPUTUM  02-05 @ 17:50  Culture-urine --  Culture results --  method type NEGATIVE ELIEZER 43  Organism Pseudomonas aeruginosa  QUANTITY OF GROWTH: FEW  Organism Identification Pseudomonas aeruginosa  Specimen source SPUTUM  02-05 @ 05:43  Culture-urine --  Culture results --  method type --  Organism --  Organism Identification --  Specimen source SPUTUM  02-04 @ 15:33  Culture-urine --  Culture results --  method type --  Organism --  Organism Identification --  Specimen source SPUTUM  02-03 @ 22:38  Culture-urine   AFTER 24 HOURS INCUBATION  CALB^Candida albicans  COLONY COUNT: LESS THAN 10,000 CFU/ML  Culture results --  method type --  Organism --  Organism Identification --  Specimen source URINE MIDSTREAM  02-03 @ 20:01  Culture-urine --  Culture results --  method type --  Organism --  Organism Identification --  Specimen source BLOOD PERIPHERAL  02-03 @ 19:59  Culture-urine --  Culture results --  method type --  Organism --  Organism Identification --  Specimen source BLOOD VENOUS           02-06 @ 17:32  Culture blood --  Culture results --  Gram stain --  Gram stain blood --  Method type --  Organism --  Organism identification --  Specimen source SPUTUM   02-05 @ 17:50  Culture blood --  Culture results --  Gram stain --  Gram stain blood --  Method type NEGATIVE ELIEZER 43  Organism Pseudomonas aeruginosa  QUANTITY OF GROWTH: FEW  Organism identification Pseudomonas aeruginosa  Specimen source SPUTUM   02-05 @ 05:43  Culture blood --  Culture results --  Gram stain --  Gram stain blood --  Method type --  Organism --  Organism identification --  Specimen source SPUTUM   02-04 @ 15:33  Culture blood --  Culture results --  Gram stain --  Gram stain blood --  Method type --  Organism --  Organism identification --  Specimen source SPUTUM   02-03 @ 22:38  Culture blood --  Culture results --  Gram stain --  Gram stain blood --  Method type --  Organism --  Organism identification --  Specimen source URINE MIDSTREAM   02-03 @ 20:01  Culture blood   NO ORGANISMS ISOLATED  Culture results --  Gram stain --  Gram stain blood --  Method type --  Organism --  Organism identification --  Specimen source BLOOD PERIPHERAL   02-03 @ 19:59  Culture blood   NO ORGANISMS ISOLATED  Culture results --  Gram stain --  Gram stain blood --  Method type --  Organism --  Organism identification --  Specimen source BLOOD VENOUS      RADIOLOGY & ADDITIONAL TESTS:    Imaging Personally Reviewed:    Consultant(s) Notes Reviewed:      Care Discussed with Consultants/Other Providers:
Patient is a 63y old  Female who presents with a chief complaint of Bronchiectasis, Dyspnea on Exertion, Cough and Shortness of Breath (2019 21:33)      SUBJECTIVE / OVERNIGHT EVENTS:    Events noted.  Cough  SOB on supp O2      MEDICATIONS  (STANDING):  ALBUTerol/ipratropium for Nebulization 3 milliLiter(s) Nebulizer every 6 hours  amLODIPine   Tablet 5 milliGRAM(s) Oral daily  calcium carbonate 1250 mG  + Vitamin D (OsCal 500 + D) 1 Tablet(s) Oral two times a day  cyanocobalamin 1000 MICROGram(s) Oral daily  dextrose 5%. 1000 milliLiter(s) (50 mL/Hr) IV Continuous <Continuous>  dextrose 50% Injectable 12.5 Gram(s) IV Push once  dextrose 50% Injectable 25 Gram(s) IV Push once  dextrose 50% Injectable 25 Gram(s) IV Push once  docusate sodium 100 milliGRAM(s) Oral two times a day  enoxaparin Injectable 40 milliGRAM(s) SubCutaneous every 24 hours  fluticasone propionate 50 MICROgram(s)/spray Nasal Spray 1 Spray(s) Both Nostrils two times a day  folic acid 1 milliGRAM(s) Oral daily  insulin glargine Injectable (LANTUS) 20 Unit(s) SubCutaneous at bedtime  insulin lispro (HumaLOG) corrective regimen sliding scale   SubCutaneous three times a day before meals  insulin lispro (HumaLOG) corrective regimen sliding scale   SubCutaneous at bedtime  levoFLOXacin IVPB      losartan 50 milliGRAM(s) Oral daily  montelukast 10 milliGRAM(s) Oral daily  multivitamin 1 Tablet(s) Oral daily  senna 1 Tablet(s) Oral at bedtime  sodium chloride 0.9%. 1000 milliLiter(s) (75 mL/Hr) IV Continuous <Continuous>  trimethoprim  160 mG/sulfamethoxazole 800 mG 1 Tablet(s) Oral every 12 hours    MEDICATIONS  (PRN):  acetaminophen   Tablet .. 650 milliGRAM(s) Oral every 6 hours PRN Moderate Pain (4 - 6)  Biotene Dry Mouth Oral Rinse 5 milliLiter(s) Swish and Spit every 4 hours PRN Mouth Care/Dryness  dextrose 40% Gel 15 Gram(s) Oral once PRN Blood Glucose LESS THAN 70 milliGRAM(s)/deciliter  glucagon  Injectable 1 milliGRAM(s) IntraMuscular once PRN Glucose LESS THAN 70 milligrams/deciliter  guaiFENesin   Syrup  (Sugar-Free) 200 milliGRAM(s) Oral every 6 hours PRN Cough  sodium chloride 0.65% Nasal 1 Spray(s) Both Nostrils two times a day PRN Nasal Congestion        CAPILLARY BLOOD GLUCOSE      POCT Blood Glucose.: 245 mg/dL (2019 21:27)  POCT Blood Glucose.: 217 mg/dL (2019 17:06)  POCT Blood Glucose.: 223 mg/dL (2019 12:16)  POCT Blood Glucose.: 276 mg/dL (2019 08:11)  POCT Blood Glucose.: 350 mg/dL (2019 04:03)    I&O's Summary    2019 07:01  -  2019 00:03  --------------------------------------------------------  IN: 1700 mL / OUT: 750 mL / NET: 950 mL        PHYSICAL EXAM:  GENERAL: NAD  NECK: Supple, No JVD  CHEST/LUNG: Clear to auscultation bilaterally; Bibasilar crackles  HEART: Regular rate and rhythm; No murmurs, rubs, or gallops  ABDOMEN: Soft, Nontender, Nondistended; Bowel sounds present  EXTREMITIES:   No clubbing, cyanosis, or edema  NEUROLOGY: AAO       LABS:                        9.8    6.90  )-----------( 250      ( 2019 07:15 )             32.3     02-04    135  |  101  |  20  ----------------------------<  307<H>  4.5   |  23  |  0.62    Ca    9.1      2019 07:15  Phos  3.6     02-04  Mg     2.1     02-04    TPro  6.9  /  Alb  3.5  /  TBili  < 0.2<L>  /  DBili  x   /  AST  35<H>  /  ALT  40<H>  /  AlkPhos  166<H>  02-04    PT/INR - ( 2019 19:33 )   PT: 11.6 SEC;   INR: 1.04          PTT - ( 2019 19:33 )  PTT:23.3 SEC      Urinalysis Basic - ( 2019 21:57 )    Color: LIGHT YELLOW / Appearance: CLEAR / S.021 / pH: 6.5  Gluc: >1000 / Ketone: NEGATIVE  / Bili: NEGATIVE / Urobili: NORMAL   Blood: NEGATIVE / Protein: NEGATIVE / Nitrite: NEGATIVE   Leuk Esterase: MODERATE / RBC: 0-2 / WBC 11-25   Sq Epi: OCC / Non Sq Epi: x / Bacteria: FEW      CAPILLARY BLOOD GLUCOSE      POCT Blood Glucose.: 245 mg/dL (2019 21:27)  POCT Blood Glucose.: 217 mg/dL (2019 17:06)  POCT Blood Glucose.: 223 mg/dL (2019 12:16)  POCT Blood Glucose.: 276 mg/dL (2019 08:11)  POCT Blood Glucose.: 350 mg/dL (2019 04:03)     @ 20:01  Culture-urine --  Culture results --  method type --  Organism --  Organism Identification --  Specimen source BLOOD PERIPHERAL   @ 19:59  Culture-urine --  Culture results --  method type --  Organism --  Organism Identification --  Specimen source BLOOD VENOUS            @ 20:01  Culture blood   NO ORGANISMS ISOLATED  NO ORGANISMS ISOLATED AT 24 HOURS  Culture results --  Gram stain --  Gram stain blood --  Method type --  Organism --  Organism identification --  Specimen source BLOOD PERIPHERAL    @ 19:59  Culture blood   NO ORGANISMS ISOLATED  NO ORGANISMS ISOLATED AT 24 HOURS  Culture results --  Gram stain --  Gram stain blood --  Method type --  Organism --  Organism identification --  Specimen source BLOOD VENOUS      RADIOLOGY & ADDITIONAL TESTS:    Imaging Personally Reviewed:    Consultant(s) Notes Reviewed:      Care Discussed with Consultants/Other Providers:
Patient is a 64y old  Female who presents with a chief complaint of Bronchiectasis, Dyspnea on Exertion, Cough and Shortness of Breath (08 Feb 2019 16:50)      SUBJECTIVE / OVERNIGHT EVENTS:    Events noted.  No CP/SOB  Cough    MEDICATIONS  (STANDING):  ALBUTerol/ipratropium for Nebulization 3 milliLiter(s) Nebulizer every 6 hours  amLODIPine   Tablet 5 milliGRAM(s) Oral daily  calcium carbonate 1250 mG  + Vitamin D (OsCal 500 + D) 1 Tablet(s) Oral two times a day  cyanocobalamin 1000 MICROGram(s) Oral daily  dextrose 5%. 1000 milliLiter(s) (50 mL/Hr) IV Continuous <Continuous>  dextrose 50% Injectable 12.5 Gram(s) IV Push once  dextrose 50% Injectable 25 Gram(s) IV Push once  dextrose 50% Injectable 25 Gram(s) IV Push once  docusate sodium 100 milliGRAM(s) Oral daily  docusate sodium 100 milliGRAM(s) Oral two times a day  enoxaparin Injectable 40 milliGRAM(s) SubCutaneous every 24 hours  fluticasone propionate 50 MICROgram(s)/spray Nasal Spray 1 Spray(s) Both Nostrils two times a day  folic acid 1 milliGRAM(s) Oral daily  insulin glargine Injectable (LANTUS) 20 Unit(s) SubCutaneous at bedtime  insulin lispro (HumaLOG) corrective regimen sliding scale   SubCutaneous three times a day before meals  insulin lispro (HumaLOG) corrective regimen sliding scale   SubCutaneous at bedtime  losartan 50 milliGRAM(s) Oral daily  montelukast 10 milliGRAM(s) Oral daily  multivitamin 1 Tablet(s) Oral daily  pantoprazole    Tablet 40 milliGRAM(s) Oral before breakfast  piperacillin/tazobactam IVPB. 3.375 Gram(s) IV Intermittent every 8 hours  predniSONE   Tablet 40 milliGRAM(s) Oral daily  senna 1 Tablet(s) Oral daily  senna 1 Tablet(s) Oral at bedtime  sodium chloride 0.9%. 1000 milliLiter(s) (75 mL/Hr) IV Continuous <Continuous>  trimethoprim  160 mG/sulfamethoxazole 800 mG 2 Tablet(s) Oral every 12 hours    MEDICATIONS  (PRN):  acetaminophen   Tablet .. 650 milliGRAM(s) Oral every 6 hours PRN Moderate Pain (4 - 6)  aluminum hydroxide/magnesium hydroxide/simethicone Suspension 30 milliLiter(s) Oral every 4 hours PRN Dyspepsia  Biotene Dry Mouth Oral Rinse 5 milliLiter(s) Swish and Spit every 4 hours PRN Mouth Care/Dryness  dextrose 40% Gel 15 Gram(s) Oral once PRN Blood Glucose LESS THAN 70 milliGRAM(s)/deciliter  glucagon  Injectable 1 milliGRAM(s) IntraMuscular once PRN Glucose LESS THAN 70 milligrams/deciliter  guaiFENesin   Syrup  (Sugar-Free) 200 milliGRAM(s) Oral every 6 hours PRN Cough  sodium chloride 0.65% Nasal 1 Spray(s) Both Nostrils two times a day PRN Nasal Congestion        CAPILLARY BLOOD GLUCOSE      POCT Blood Glucose.: 206 mg/dL (09 Feb 2019 21:23)  POCT Blood Glucose.: 201 mg/dL (09 Feb 2019 17:11)  POCT Blood Glucose.: 202 mg/dL (09 Feb 2019 12:04)  POCT Blood Glucose.: 116 mg/dL (09 Feb 2019 07:49)    I&O's Summary    08 Feb 2019 07:01  -  09 Feb 2019 07:00  --------------------------------------------------------  IN: 800 mL / OUT: 0 mL / NET: 800 mL        PHYSICAL EXAM:  GENERAL: NAD  NECK: Supple, No JVD  CHEST/LUNG: Clear to auscultation bilaterally; BL Basilar crackles  HEART: Regular rate and rhythm; No murmurs, rubs, or gallops  ABDOMEN: Soft, Nontender, Nondistended; Bowel sounds present  EXTREMITIES:   No clubbing, cyanosis, or edema  NEUROLOGY: AAO X 3      LABS:                        10.1   9.19  )-----------( 244      ( 08 Feb 2019 05:59 )             32.8     02-08    138  |  102  |  23  ----------------------------<  84  4.3   |  22  |  0.84    Ca    9.1      08 Feb 2019 05:54  Phos  3.2     02-08  Mg     1.9     02-08    TPro  6.7  /  Alb  3.5  /  TBili  < 0.2<L>  /  DBili  x   /  AST  24  /  ALT  32  /  AlkPhos  130<H>  02-08            CAPILLARY BLOOD GLUCOSE      POCT Blood Glucose.: 206 mg/dL (09 Feb 2019 21:23)  POCT Blood Glucose.: 201 mg/dL (09 Feb 2019 17:11)  POCT Blood Glucose.: 202 mg/dL (09 Feb 2019 12:04)  POCT Blood Glucose.: 116 mg/dL (09 Feb 2019 07:49)    02-09 @ 17:21  Culture-urine --  Culture results --  method type --  Organism --  Organism Identification --  Specimen source SPUTUM  02-06 @ 17:32  Culture-urine --  Culture results --  method type --  Organism --  Organism Identification --  Specimen source SPUTUM  02-05 @ 17:50  Culture-urine --  Culture results --  method type NEGATIVE ELIEZER 43  Organism Pseudomonas aeruginosa  QUANTITY OF GROWTH: FEW  Organism Identification Pseudomonas aeruginosa  Specimen source SPUTUM  02-05 @ 05:43  Culture-urine --  Culture results --  method type --  Organism --  Organism Identification --  Specimen source SPUTUM  02-04 @ 15:33  Culture-urine --  Culture results --  method type --  Organism --  Organism Identification --  Specimen source SPUTUM  02-03 @ 22:38  Culture-urine   AFTER 24 HOURS INCUBATION  CALB^Candida albicans  COLONY COUNT: LESS THAN 10,000 CFU/ML  Culture results --  method type --  Organism --  Organism Identification --  Specimen source URINE MIDSTREAM  02-03 @ 20:01  Culture-urine --  Culture results --  method type --  Organism --  Organism Identification --  Specimen source BLOOD PERIPHERAL  02-03 @ 19:59  Culture-urine --  Culture results --  method type --  Organism --  Organism Identification --  Specimen source BLOOD VENOUS      02-09 @ 17:21  Culture-CSF --  Culture results --  Gram stain --  Gram stain spinal fluid --  Method type --  Organism --  Organism identification --  Specimen source SPUTUM       02-09 @ 17:21  Culture blood --  Culture results --  Gram stain --  Gram stain blood --  Method type --  Organism --  Organism identification --  Specimen source SPUTUM   02-06 @ 17:32  Culture blood --  Culture results --  Gram stain --  Gram stain blood --  Method type --  Organism --  Organism identification --  Specimen source SPUTUM   02-05 @ 17:50  Culture blood --  Culture results --  Gram stain --  Gram stain blood --  Method type NEGATIVE ELIEZER 43  Organism Pseudomonas aeruginosa  QUANTITY OF GROWTH: FEW  Organism identification Pseudomonas aeruginosa  Specimen source SPUTUM   02-05 @ 05:43  Culture blood --  Culture results --  Gram stain --  Gram stain blood --  Method type --  Organism --  Organism identification --  Specimen source SPUTUM   02-04 @ 15:33  Culture blood --  Culture results --  Gram stain --  Gram stain blood --  Method type --  Organism --  Organism identification --  Specimen source SPUTUM   02-03 @ 22:38  Culture blood --  Culture results --  Gram stain --  Gram stain blood --  Method type --  Organism --  Organism identification --  Specimen source URINE MIDSTREAM   02-03 @ 20:01  Culture blood   NO ORGANISMS ISOLATED  Culture results --  Gram stain --  Gram stain blood --  Method type --  Organism --  Organism identification --  Specimen source BLOOD PERIPHERAL   02-03 @ 19:59  Culture blood   NO ORGANISMS ISOLATED  Culture results --  Gram stain --  Gram stain blood --  Method type --  Organism --  Organism identification --  Specimen source BLOOD VENOUS      RADIOLOGY & ADDITIONAL TESTS:    Imaging Personally Reviewed:    Consultant(s) Notes Reviewed:      Care Discussed with Consultants/Other Providers:
Patient is a 64y old  Female who presents with a chief complaint of Bronchiectasis, Dyspnea on Exertion, Cough and Shortness of Breath (09 Feb 2019 16:11)      SUBJECTIVE / OVERNIGHT EVENTS:    Events noted.  Feels better.  RESPIRATORY:  cough, No wheezing, chills or hemoptysis; On supplement O2.  CARDIOVASCULAR: No chest pain, palpitations, dizziness, or leg swelling  GASTROINTESTINAL: No abdominal or epigastric pain. No nausea, vomiting, or hematemesis; No diarrhea or constipation.   NEUROLOGICAL: No headaches,     MEDICATIONS  (STANDING):  ALBUTerol/ipratropium for Nebulization 3 milliLiter(s) Nebulizer every 6 hours  amLODIPine   Tablet 5 milliGRAM(s) Oral daily  calcium carbonate 1250 mG  + Vitamin D (OsCal 500 + D) 1 Tablet(s) Oral two times a day  cyanocobalamin 1000 MICROGram(s) Oral daily  dextrose 5%. 1000 milliLiter(s) (50 mL/Hr) IV Continuous <Continuous>  dextrose 50% Injectable 12.5 Gram(s) IV Push once  dextrose 50% Injectable 25 Gram(s) IV Push once  dextrose 50% Injectable 25 Gram(s) IV Push once  docusate sodium 100 milliGRAM(s) Oral daily  docusate sodium 100 milliGRAM(s) Oral two times a day  enoxaparin Injectable 40 milliGRAM(s) SubCutaneous every 24 hours  fluticasone propionate 50 MICROgram(s)/spray Nasal Spray 1 Spray(s) Both Nostrils two times a day  folic acid 1 milliGRAM(s) Oral daily  insulin glargine Injectable (LANTUS) 20 Unit(s) SubCutaneous at bedtime  insulin lispro (HumaLOG) corrective regimen sliding scale   SubCutaneous three times a day before meals  insulin lispro (HumaLOG) corrective regimen sliding scale   SubCutaneous at bedtime  losartan 50 milliGRAM(s) Oral daily  montelukast 10 milliGRAM(s) Oral daily  multivitamin 1 Tablet(s) Oral daily  pantoprazole    Tablet 40 milliGRAM(s) Oral before breakfast  piperacillin/tazobactam IVPB. 3.375 Gram(s) IV Intermittent every 8 hours  predniSONE   Tablet 40 milliGRAM(s) Oral daily  senna 1 Tablet(s) Oral daily  senna 1 Tablet(s) Oral at bedtime  sodium chloride 0.9%. 1000 milliLiter(s) (75 mL/Hr) IV Continuous <Continuous>  trimethoprim  160 mG/sulfamethoxazole 800 mG 2 Tablet(s) Oral every 12 hours    MEDICATIONS  (PRN):  acetaminophen   Tablet .. 650 milliGRAM(s) Oral every 6 hours PRN Moderate Pain (4 - 6)  aluminum hydroxide/magnesium hydroxide/simethicone Suspension 30 milliLiter(s) Oral every 4 hours PRN Dyspepsia  Biotene Dry Mouth Oral Rinse 5 milliLiter(s) Swish and Spit every 4 hours PRN Mouth Care/Dryness  dextrose 40% Gel 15 Gram(s) Oral once PRN Blood Glucose LESS THAN 70 milliGRAM(s)/deciliter  glucagon  Injectable 1 milliGRAM(s) IntraMuscular once PRN Glucose LESS THAN 70 milligrams/deciliter  guaiFENesin   Syrup  (Sugar-Free) 200 milliGRAM(s) Oral every 6 hours PRN Cough  melatonin 6 milliGRAM(s) Oral at bedtime PRN Insomnia  sodium chloride 0.65% Nasal 1 Spray(s) Both Nostrils two times a day PRN Nasal Congestion        CAPILLARY BLOOD GLUCOSE      POCT Blood Glucose.: 162 mg/dL (10 Feb 2019 21:16)  POCT Blood Glucose.: 217 mg/dL (10 Feb 2019 17:00)  POCT Blood Glucose.: 188 mg/dL (10 Feb 2019 11:54)  POCT Blood Glucose.: 178 mg/dL (10 Feb 2019 07:46)    I&O's Summary    09 Feb 2019 07:01  -  10 Feb 2019 07:00  --------------------------------------------------------  IN: 700 mL / OUT: 200 mL / NET: 500 mL    10 Feb 2019 07:01  -  10 Feb 2019 23:57  --------------------------------------------------------  IN: 1400 mL / OUT: 400 mL / NET: 1000 mL        PHYSICAL EXAM:  GENERAL: NAD  NECK: Supple, No JVD  CHEST/LUNG: Clear to auscultation bilaterally; Bibasilar crackles  HEART: Regular rate and rhythm; No murmurs, rubs, or gallops  ABDOMEN: Soft, Nontender, Nondistended; Bowel sounds present  EXTREMITIES:   No clubbing, cyanosis, or edema  NEUROLOGY: AAO X 3      LABS:                        10.1   7.45  )-----------( 206      ( 10 Feb 2019 05:45 )             33.9     02-10    133<L>  |  99  |  31<H>  ----------------------------<  267<H>  4.6   |  22  |  0.90    Ca    9.1      10 Feb 2019 05:45  Phos  3.9     02-10  Mg     2.0     02-10    TPro  6.8  /  Alb  3.6  /  TBili  < 0.2<L>  /  DBili  x   /  AST  51<H>  /  ALT  50<H>  /  AlkPhos  158<H>  02-10            CAPILLARY BLOOD GLUCOSE      POCT Blood Glucose.: 162 mg/dL (10 Feb 2019 21:16)  POCT Blood Glucose.: 217 mg/dL (10 Feb 2019 17:00)  POCT Blood Glucose.: 188 mg/dL (10 Feb 2019 11:54)  POCT Blood Glucose.: 178 mg/dL (10 Feb 2019 07:46)    02-09 @ 17:21  Culture-urine --  Culture results --  method type --  Organism --  Organism Identification --  Specimen source SPUTUM  02-06 @ 17:32  Culture-urine --  Culture results --  method type --  Organism --  Organism Identification --  Specimen source SPUTUM  02-05 @ 17:50  Culture-urine --  Culture results --  method type NEGATIVE ELIEZER 43  Organism Pseudomonas aeruginosa  QUANTITY OF GROWTH: FEW  Organism Identification Pseudomonas aeruginosa  Specimen source SPUTUM  02-05 @ 05:43  Culture-urine --  Culture results --  method type --  Organism --  Organism Identification --  Specimen source SPUTUM  02-04 @ 15:33  Culture-urine --  Culture results --  method type --  Organism --  Organism Identification --  Specimen source SPUTUM           02-09 @ 17:21  Culture blood --  Culture results --  Gram stain --  Gram stain blood --  Method type --  Organism --  Organism identification --  Specimen source SPUTUM   02-06 @ 17:32  Culture blood --  Culture results --  Gram stain --  Gram stain blood --  Method type --  Organism --  Organism identification --  Specimen source SPUTUM   02-05 @ 17:50  Culture blood --  Culture results --  Gram stain --  Gram stain blood --  Method type NEGATIVE ELIEZER 43  Organism Pseudomonas aeruginosa  QUANTITY OF GROWTH: FEW  Organism identification Pseudomonas aeruginosa  Specimen source SPUTUM   02-05 @ 05:43  Culture blood --  Culture results --  Gram stain --  Gram stain blood --  Method type --  Organism --  Organism identification --  Specimen source SPUTUM   02-04 @ 15:33  Culture blood --  Culture results --  Gram stain --  Gram stain blood --  Method type --  Organism --  Organism identification --  Specimen source SPUTUM      RADIOLOGY & ADDITIONAL TESTS:    Imaging Personally Reviewed:    Consultant(s) Notes Reviewed:      Care Discussed with Consultants/Other Providers:
Patient is a 64y old  Female who presents with a chief complaint of Bronchiectasis, Dyspnea on Exertion, Cough and Shortness of Breath (12 Feb 2019 17:37)      SUBJECTIVE / OVERNIGHT EVENTS:    Events noted.  Feels better.  Cough  No CP/SOB    MEDICATIONS  (STANDING):  ALBUTerol/ipratropium for Nebulization 3 milliLiter(s) Nebulizer every 6 hours  amLODIPine   Tablet 5 milliGRAM(s) Oral daily  calcium carbonate 1250 mG  + Vitamin D (OsCal 500 + D) 1 Tablet(s) Oral two times a day  cyanocobalamin 1000 MICROGram(s) Oral daily  dextrose 5%. 1000 milliLiter(s) (50 mL/Hr) IV Continuous <Continuous>  dextrose 50% Injectable 12.5 Gram(s) IV Push once  dextrose 50% Injectable 25 Gram(s) IV Push once  dextrose 50% Injectable 25 Gram(s) IV Push once  docusate sodium 100 milliGRAM(s) Oral two times a day  enoxaparin Injectable 40 milliGRAM(s) SubCutaneous every 24 hours  fluticasone propionate 50 MICROgram(s)/spray Nasal Spray 1 Spray(s) Both Nostrils two times a day  folic acid 1 milliGRAM(s) Oral daily  insulin glargine Injectable (LANTUS) 20 Unit(s) SubCutaneous at bedtime  insulin lispro (HumaLOG) corrective regimen sliding scale   SubCutaneous three times a day before meals  insulin lispro (HumaLOG) corrective regimen sliding scale   SubCutaneous at bedtime  losartan 50 milliGRAM(s) Oral daily  montelukast 10 milliGRAM(s) Oral daily  multivitamin 1 Tablet(s) Oral daily  pantoprazole    Tablet 40 milliGRAM(s) Oral before breakfast  piperacillin/tazobactam IVPB. 3.375 Gram(s) IV Intermittent every 8 hours  polyethylene glycol 3350 17 Gram(s) Oral daily  predniSONE   Tablet 40 milliGRAM(s) Oral daily  senna 1 Tablet(s) Oral at bedtime  trimethoprim  160 mG/sulfamethoxazole 800 mG 2 Tablet(s) Oral every 12 hours    MEDICATIONS  (PRN):  acetaminophen   Tablet .. 650 milliGRAM(s) Oral every 6 hours PRN Moderate Pain (4 - 6)  aluminum hydroxide/magnesium hydroxide/simethicone Suspension 30 milliLiter(s) Oral every 4 hours PRN Dyspepsia  Biotene Dry Mouth Oral Rinse 5 milliLiter(s) Swish and Spit every 4 hours PRN Mouth Care/Dryness  dextrose 40% Gel 15 Gram(s) Oral once PRN Blood Glucose LESS THAN 70 milliGRAM(s)/deciliter  glucagon  Injectable 1 milliGRAM(s) IntraMuscular once PRN Glucose LESS THAN 70 milligrams/deciliter  guaiFENesin   Syrup  (Sugar-Free) 200 milliGRAM(s) Oral every 6 hours PRN Cough  melatonin 6 milliGRAM(s) Oral at bedtime PRN Insomnia  sodium chloride 0.65% Nasal 1 Spray(s) Both Nostrils two times a day PRN Nasal Congestion        CAPILLARY BLOOD GLUCOSE      POCT Blood Glucose.: 315 mg/dL (12 Feb 2019 17:15)  POCT Blood Glucose.: 386 mg/dL (12 Feb 2019 11:55)  POCT Blood Glucose.: 178 mg/dL (12 Feb 2019 07:35)  POCT Blood Glucose.: 111 mg/dL (11 Feb 2019 21:13)    I&O's Summary    11 Feb 2019 07:01  -  12 Feb 2019 07:00  --------------------------------------------------------  IN: 400 mL / OUT: 400 mL / NET: 0 mL        PHYSICAL EXAM:  GENERAL: NAD  NECK: Supple, No JVD  CHEST/LUNG: Clear to auscultation bilaterally; BL basilar crackles  HEART: Regular rate and rhythm; No murmurs, rubs, or gallops  ABDOMEN: Soft, Nontender, Nondistended; Bowel sounds present  EXTREMITIES:   No clubbing, cyanosis, or edema  NEUROLOGY: AAO X 3      LABS:                        10.3   8.60  )-----------( 213      ( 11 Feb 2019 06:54 )             33.9     02-11    136  |  102  |  23  ----------------------------<  66<L>  4.5   |  24  |  0.93    Ca    9.3      11 Feb 2019 06:54  Phos  3.1     02-11  Mg     2.0     02-11    TPro  7.0  /  Alb  3.6  /  TBili  < 0.2<L>  /  DBili  x   /  AST  67<H>  /  ALT  66<H>  /  AlkPhos  139<H>  02-11            CAPILLARY BLOOD GLUCOSE      POCT Blood Glucose.: 315 mg/dL (12 Feb 2019 17:15)  POCT Blood Glucose.: 386 mg/dL (12 Feb 2019 11:55)  POCT Blood Glucose.: 178 mg/dL (12 Feb 2019 07:35)  POCT Blood Glucose.: 111 mg/dL (11 Feb 2019 21:13)    02-09 @ 17:21  Culture-urine --  Culture results --  method type --  Organism --  Organism Identification --  Specimen source SPUTUM  02-06 @ 17:32  Culture-urine --  Culture results --  method type --  Organism --  Organism Identification --  Specimen source SPUTUM           02-09 @ 17:21  Culture blood --  Culture results --  Gram stain --  Gram stain blood --  Method type --  Organism --  Organism identification --  Specimen source SPUTUM   02-06 @ 17:32  Culture blood --  Culture results --  Gram stain --  Gram stain blood --  Method type --  Organism --  Organism identification --  Specimen source SPUTUM      RADIOLOGY & ADDITIONAL TESTS:    Imaging Personally Reviewed:    Consultant(s) Notes Reviewed:      Care Discussed with Consultants/Other Providers:
Interval Events: Patient seen and examined at the bedside. No acute events noted. Patient still with cough.     REVIEW OF SYSTEMS:  Constitutional: + fevers or chills. No weight loss. + fatigue or generalised malaise.  Eyes: No itching or discharge from the eyes  ENT: No ear pain. No ear discharge. ++ nasal congestion. + post nasal drip. No epistaxis. No throat pain. No sore throat. No difficulty swallowing.   CV: No chest pain. No palpitations. No lightheadedness or dizziness.   Resp: + dyspnea at rest. + dyspnea on exertion. No orthopnea. No wheezing. + cough. No stridor. + sputum production. No chest pain with respiration.  GI: No nausea. No vomiting. No diarrhea.  MSK: No joint pain or pain in any extremities  Integumentary: No skin lesions. No pedal edema.  Neurological: No gross motor weakness. No sensory changes.    [X ] All other systems negative    OBJECTIVE:  ICU Vital Signs Last 24 Hrs  T(C): 36.4 (05 Feb 2019 12:09), Max: 36.7 (04 Feb 2019 21:24)  T(F): 97.6 (05 Feb 2019 12:09), Max: 98.1 (04 Feb 2019 21:24)  HR: 88 (05 Feb 2019 15:25) (78 - 96)  BP: 128/62 (05 Feb 2019 12:09) (128/62 - 139/81)  RR: 17 (05 Feb 2019 12:09) (16 - 17)  SpO2: 98% (05 Feb 2019 15:25) (94% - 98%)    POCT Blood Glucose.: 245 mg/dL (04 Feb 2019 21:27)    PHYSICAL EXAM:  General: Awake, alert, oriented X 3.   HEENT: Atraumatic, normocephalic.                 Mallampatti Grade 2   Lymph Nodes: No palpable lymphadenopathy  Neck: No JVD. No carotid bruit.   Respiratory: Normal chest expansion                         B/l rhoncherous.   Cardiovascular: S1 S2 normal. No murmurs, rubs or gallops.   Abdomen: Soft, non-tender, non-distended. No organomegaly.  Extremities: Warm to touch. Peripheral pulse palpable. No pedal edema.   Skin: No rashes or skin lesions  Neurological: Motor and sensory examination equal and normal in all four extremities.  Psychiatry: Appropriate mood and affect.    HOSPITAL MEDICATIONS:  MEDICATIONS  (STANDING):  ALBUTerol/ipratropium for Nebulization 3 milliLiter(s) Nebulizer every 6 hours  amLODIPine   Tablet 5 milliGRAM(s) Oral daily  calcium carbonate 1250 mG  + Vitamin D (OsCal 500 + D) 1 Tablet(s) Oral two times a day  cyanocobalamin 1000 MICROGram(s) Oral daily  dextrose 5%. 1000 milliLiter(s) (50 mL/Hr) IV Continuous <Continuous>  dextrose 50% Injectable 12.5 Gram(s) IV Push once  dextrose 50% Injectable 25 Gram(s) IV Push once  dextrose 50% Injectable 25 Gram(s) IV Push once  docusate sodium 100 milliGRAM(s) Oral two times a day  enoxaparin Injectable 40 milliGRAM(s) SubCutaneous every 24 hours  fluticasone propionate 50 MICROgram(s)/spray Nasal Spray 1 Spray(s) Both Nostrils two times a day  folic acid 1 milliGRAM(s) Oral daily  insulin glargine Injectable (LANTUS) 20 Unit(s) SubCutaneous at bedtime  insulin lispro (HumaLOG) corrective regimen sliding scale   SubCutaneous three times a day before meals  insulin lispro (HumaLOG) corrective regimen sliding scale   SubCutaneous at bedtime  levoFLOXacin IVPB 750 milliGRAM(s) IV Intermittent every 24 hours  levoFLOXacin IVPB      losartan 50 milliGRAM(s) Oral daily  montelukast 10 milliGRAM(s) Oral daily  multivitamin 1 Tablet(s) Oral daily  senna 1 Tablet(s) Oral at bedtime  sodium chloride 0.9%. 1000 milliLiter(s) (75 mL/Hr) IV Continuous <Continuous>  trimethoprim  160 mG/sulfamethoxazole 800 mG 2 Tablet(s) Oral every 12 hours    MEDICATIONS  (PRN):  acetaminophen   Tablet .. 650 milliGRAM(s) Oral every 6 hours PRN Moderate Pain (4 - 6)  Biotene Dry Mouth Oral Rinse 5 milliLiter(s) Swish and Spit every 4 hours PRN Mouth Care/Dryness  dextrose 40% Gel 15 Gram(s) Oral once PRN Blood Glucose LESS THAN 70 milliGRAM(s)/deciliter  glucagon  Injectable 1 milliGRAM(s) IntraMuscular once PRN Glucose LESS THAN 70 milligrams/deciliter  guaiFENesin   Syrup  (Sugar-Free) 200 milliGRAM(s) Oral every 6 hours PRN Cough  sodium chloride 0.65% Nasal 1 Spray(s) Both Nostrils two times a day PRN Nasal Congestion    sodium chloride 0.65% Nasal 1 Spray(s) Both Nostrils two times a day PRN Nasal Congestion      LABS:                            9.7    9.93  )-----------( 227      ( 05 Feb 2019 06:00 )             31.4     02-05    133<L>  |  100  |  21  ----------------------------<  362<H>  4.2   |  23  |  0.66    Ca    9.2      05 Feb 2019 06:00  Phos  3.0     02-05  Mg     1.9     02-05    TPro  6.7  /  Alb  3.5  /  TBili  0.2  /  DBili  x   /  AST  36<H>  /  ALT  44<H>  /  AlkPhos  153<H>  02-05    MICROBIOLOGY:     Prior sputum cx reviewed.   Sputum cx 7/3/2018- pseudomonas and stenotrophomonas  Sputum cx 12/21/2018 - pseudomonas   AFB cx 12/21/2018 - smear negative and no growth in 1 week   While pseudomonas was sensitive to levaquin, the stenotrophomonas in July 18 was resistant to levaquin.     RADIOLOGY:  [X] Reviewed and interpreted by me - No new focal findings.
Patient is a 64y old  Female who presents with a chief complaint of Bronchiectasis, Dyspnea on Exertion, Cough and Shortness of Breath (11 Feb 2019 17:50)      SUBJECTIVE / OVERNIGHT EVENTS:    Events noted.  Feels better.  Cough/SOB  CARDIOVASCULAR: No chest pain, palpitations, dizziness, or leg swelling  GASTROINTESTINAL: No abdominal or epigastric pain. No nausea, vomiting, or hematemesis; No diarrhea or constipation.   NEUROLOGICAL: No headaches,     MEDICATIONS  (STANDING):  ALBUTerol/ipratropium for Nebulization 3 milliLiter(s) Nebulizer every 6 hours  amLODIPine   Tablet 5 milliGRAM(s) Oral daily  calcium carbonate 1250 mG  + Vitamin D (OsCal 500 + D) 1 Tablet(s) Oral two times a day  cyanocobalamin 1000 MICROGram(s) Oral daily  dextrose 5%. 1000 milliLiter(s) (50 mL/Hr) IV Continuous <Continuous>  dextrose 50% Injectable 12.5 Gram(s) IV Push once  dextrose 50% Injectable 25 Gram(s) IV Push once  dextrose 50% Injectable 25 Gram(s) IV Push once  docusate sodium 100 milliGRAM(s) Oral daily  docusate sodium 100 milliGRAM(s) Oral two times a day  enoxaparin Injectable 40 milliGRAM(s) SubCutaneous every 24 hours  fluticasone propionate 50 MICROgram(s)/spray Nasal Spray 1 Spray(s) Both Nostrils two times a day  folic acid 1 milliGRAM(s) Oral daily  insulin glargine Injectable (LANTUS) 20 Unit(s) SubCutaneous at bedtime  insulin lispro (HumaLOG) corrective regimen sliding scale   SubCutaneous three times a day before meals  insulin lispro (HumaLOG) corrective regimen sliding scale   SubCutaneous at bedtime  losartan 50 milliGRAM(s) Oral daily  montelukast 10 milliGRAM(s) Oral daily  multivitamin 1 Tablet(s) Oral daily  pantoprazole    Tablet 40 milliGRAM(s) Oral before breakfast  piperacillin/tazobactam IVPB. 3.375 Gram(s) IV Intermittent every 8 hours  predniSONE   Tablet 40 milliGRAM(s) Oral daily  senna 1 Tablet(s) Oral daily  senna 1 Tablet(s) Oral at bedtime  sodium chloride 0.9%. 1000 milliLiter(s) (75 mL/Hr) IV Continuous <Continuous>  trimethoprim  160 mG/sulfamethoxazole 800 mG 2 Tablet(s) Oral every 12 hours    MEDICATIONS  (PRN):  acetaminophen   Tablet .. 650 milliGRAM(s) Oral every 6 hours PRN Moderate Pain (4 - 6)  aluminum hydroxide/magnesium hydroxide/simethicone Suspension 30 milliLiter(s) Oral every 4 hours PRN Dyspepsia  Biotene Dry Mouth Oral Rinse 5 milliLiter(s) Swish and Spit every 4 hours PRN Mouth Care/Dryness  dextrose 40% Gel 15 Gram(s) Oral once PRN Blood Glucose LESS THAN 70 milliGRAM(s)/deciliter  glucagon  Injectable 1 milliGRAM(s) IntraMuscular once PRN Glucose LESS THAN 70 milligrams/deciliter  guaiFENesin   Syrup  (Sugar-Free) 200 milliGRAM(s) Oral every 6 hours PRN Cough  melatonin 6 milliGRAM(s) Oral at bedtime PRN Insomnia  sodium chloride 0.65% Nasal 1 Spray(s) Both Nostrils two times a day PRN Nasal Congestion        CAPILLARY BLOOD GLUCOSE      POCT Blood Glucose.: 111 mg/dL (11 Feb 2019 21:13)  POCT Blood Glucose.: 333 mg/dL (11 Feb 2019 17:13)  POCT Blood Glucose.: 240 mg/dL (11 Feb 2019 12:03)  POCT Blood Glucose.: 75 mg/dL (11 Feb 2019 07:40)    I&O's Summary    10 Feb 2019 07:01  -  11 Feb 2019 07:00  --------------------------------------------------------  IN: 1750 mL / OUT: 400 mL / NET: 1350 mL        PHYSICAL EXAM:  GENERAL: NAD  NECK: Supple, No JVD  CHEST/LUNG: Clear to auscultation bilaterally; BL rhonchi  HEART: Regular rate and rhythm; No murmurs, rubs, or gallops  ABDOMEN: Soft, Nontender, Nondistended; Bowel sounds present  EXTREMITIES:   No clubbing, cyanosis, or edema  NEUROLOGY: AAO X 3      LABS:                        10.3   8.60  )-----------( 213      ( 11 Feb 2019 06:54 )             33.9     02-11    136  |  102  |  23  ----------------------------<  66<L>  4.5   |  24  |  0.93    Ca    9.3      11 Feb 2019 06:54  Phos  3.1     02-11  Mg     2.0     02-11    TPro  7.0  /  Alb  3.6  /  TBili  < 0.2<L>  /  DBili  x   /  AST  67<H>  /  ALT  66<H>  /  AlkPhos  139<H>  02-11            CAPILLARY BLOOD GLUCOSE      POCT Blood Glucose.: 111 mg/dL (11 Feb 2019 21:13)  POCT Blood Glucose.: 333 mg/dL (11 Feb 2019 17:13)  POCT Blood Glucose.: 240 mg/dL (11 Feb 2019 12:03)  POCT Blood Glucose.: 75 mg/dL (11 Feb 2019 07:40)    02-09 @ 17:21  Culture-urine --  Culture results --  method type --  Organism --  Organism Identification --  Specimen source SPUTUM  02-06 @ 17:32  Culture-urine --  Culture results --  method type --  Organism --  Organism Identification --  Specimen source SPUTUM  02-05 @ 17:50  Culture-urine --  Culture results --  method type NEGATIVE ELIEZER 43  Organism Pseudomonas aeruginosa  QUANTITY OF GROWTH: FEW  Organism Identification Pseudomonas aeruginosa  Specimen source SPUTUM  02-05 @ 05:43  Culture-urine --  Culture results --  method type --  Organism --  Organism Identification --  Specimen source SPUTUM           02-09 @ 17:21  Culture blood --  Culture results --  Gram stain --  Gram stain blood --  Method type --  Organism --  Organism identification --  Specimen source SPUTUM   02-06 @ 17:32  Culture blood --  Culture results --  Gram stain --  Gram stain blood --  Method type --  Organism --  Organism identification --  Specimen source SPUTUM   02-05 @ 17:50  Culture blood --  Culture results --  Gram stain --  Gram stain blood --  Method type NEGATIVE ELIEZER 43  Organism Pseudomonas aeruginosa  QUANTITY OF GROWTH: FEW  Organism identification Pseudomonas aeruginosa  Specimen source SPUTUM   02-05 @ 05:43  Culture blood --  Culture results --  Gram stain --  Gram stain blood --  Method type --  Organism --  Organism identification --  Specimen source SPUTUM      RADIOLOGY & ADDITIONAL TESTS:    Imaging Personally Reviewed:    Consultant(s) Notes Reviewed:      Care Discussed with Consultants/Other Providers:

## 2019-02-13 NOTE — PROGRESS NOTE ADULT - PROVIDER SPECIALTY LIST ADULT
Infectious Disease
Internal Medicine
Pulmonology
Internal Medicine
Pulmonology
Internal Medicine

## 2019-02-13 NOTE — CHART NOTE - NSCHARTNOTEFT_GEN_A_CORE
Notified by RN pt hypoglycemic to 63 which improved to 91 and 141 after oral intake. Night lantus dose given

## 2019-02-13 NOTE — PROGRESS NOTE ADULT - ASSESSMENT
63 year old with poorly controlled DM, COPD, and bronchiectasis presents with acute on chronic shortness of breath with cough    1) Bronchiectasis/ COPD exacerbation: poor control in past. States she has been taking her treatments since her last pulmonary visit.    CXR without new consolidation. Afebrile with normal WBC.    In past- colonized with psuedomonas and S. maltophilia-    S/p treatemtn for stenotrphomonas and Pseudomonas without much improvment    I am not convinced a long course of abx will benefit pt.  Observe off tx dose of abx.    If pt is on steroids at discharge, consider Bactrim DS 1 po daily for prophylaxis.    2) AFB- one positive AFB cx in past for [place]  Repeat sputum AFB- no need to isolate  Smears from sputum are negative for afb times three    3) Pseudomonas culture: tx with zosyn

## 2019-02-15 LAB — ACID FAST STN SPT: NORMAL

## 2019-02-26 ENCOUNTER — APPOINTMENT (OUTPATIENT)
Dept: PULMONOLOGY | Facility: CLINIC | Age: 64
End: 2019-02-26
Payer: MEDICAID

## 2019-02-26 VITALS
OXYGEN SATURATION: 92 % | BODY MASS INDEX: 18.07 KG/M2 | WEIGHT: 102 LBS | TEMPERATURE: 98 F | DIASTOLIC BLOOD PRESSURE: 76 MMHG | HEART RATE: 81 BPM | RESPIRATION RATE: 16 BRPM | SYSTOLIC BLOOD PRESSURE: 135 MMHG

## 2019-02-26 PROBLEM — D50.9 IRON DEFICIENCY ANEMIA, UNSPECIFIED: Chronic | Status: ACTIVE | Noted: 2019-02-04

## 2019-02-26 PROBLEM — J18.9 PNEUMONIA, UNSPECIFIED ORGANISM: Chronic | Status: ACTIVE | Noted: 2019-02-04

## 2019-02-26 PROBLEM — J30.9 ALLERGIC RHINITIS, UNSPECIFIED: Chronic | Status: ACTIVE | Noted: 2019-02-04

## 2019-02-26 PROBLEM — E55.9 VITAMIN D DEFICIENCY, UNSPECIFIED: Chronic | Status: ACTIVE | Noted: 2019-02-04

## 2019-02-26 PROBLEM — R09.82 POSTNASAL DRIP: Chronic | Status: ACTIVE | Noted: 2019-02-04

## 2019-02-26 PROBLEM — K21.9 GASTRO-ESOPHAGEAL REFLUX DISEASE WITHOUT ESOPHAGITIS: Chronic | Status: ACTIVE | Noted: 2019-02-04

## 2019-02-26 PROBLEM — Z22.39 CARRIER OF OTHER SPECIFIED BACTERIAL DISEASES: Chronic | Status: ACTIVE | Noted: 2019-02-04

## 2019-02-26 PROCEDURE — ZZZZZ: CPT

## 2019-02-26 PROCEDURE — 99214 OFFICE O/P EST MOD 30 MIN: CPT | Mod: 25

## 2019-02-26 PROCEDURE — 94618 PULMONARY STRESS TESTING: CPT

## 2019-02-26 RX ORDER — SULFAMETHOXAZOLE AND TRIMETHOPRIM 800; 160 MG/1; MG/1
800-160 TABLET ORAL
Refills: 0 | Status: DISCONTINUED | COMMUNITY
End: 2019-02-26

## 2019-02-26 RX ORDER — SULFAMETHOXAZOLE AND TRIMETHOPRIM 800; 160 MG/1; MG/1
800-160 TABLET ORAL TWICE DAILY
Qty: 14 | Refills: 1 | Status: DISCONTINUED | COMMUNITY
Start: 2017-12-12 | End: 2019-02-26

## 2019-02-26 NOTE — ASSESSMENT
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 Last seen on 1/22/2019. Pt shortly thereafter was [facility] from 2/3[facility]13. Sputum grew pseudomonas resistant to fluoroquinolones, which was evaluated by ID and thought to probably be due to colonization. She completed a course of Zosyn and Bactrim (previously had grown S maltophilia). Comes back to the office with persistent dyspnea, cough productive of yellow-thick sputum. Pt has not been taking budesonide has been taking symbicort. On exam, pt is wheezing and with rhonchi. \par \par - completed 28 days of inhaled tobra (2/13); now off 28 days, will resume on 3/14\par - holding azithromycin for now as patient has one specimen positive for MAC- do not want to induce resistance. \par - will give prednisone 20 mg x 5 days for persistent dyspnea, no need for abx at this time\par - airway clearance: aerobika, chest vest, albuterol \par - COPD: perforomist bid, budesonide bid, albuterol prn; d/c symbicort and Breo given to pt during hospitalization\par - will resend CF genetics panel sent, including complete rare mutation, deletion/duplication, and expanded panel (prior was canceled)\par - f/u final AFB cultures from hospitalization\par - explained extensively with daughter and patient importance of compliance with nebulizers and airway clearance\par \par Case d/w Dr. Young

## 2019-02-26 NOTE — PHYSICAL EXAM
[Normal Appearance] : normal appearance [General Appearance - In No Acute Distress] : no acute distress [Neck Appearance] : the appearance of the neck was normal [Heart Rate And Rhythm] : heart rate and rhythm were normal [Heart Sounds] : normal S1 and S2 [Murmurs] : no murmurs present [Exaggerated Use Of Accessory Muscles For Inspiration] : no accessory muscle use [Bowel Sounds] : normal bowel sounds [Abdomen Soft] : soft [Abdomen Tenderness] : non-tender [Abnormal Walk] : normal gait [Nail Clubbing] : no clubbing of the fingernails [Cyanosis, Localized] : no localized cyanosis [Skin Turgor] : normal skin turgor [] : no rash [Motor Exam] : the motor exam was normal [No Focal Deficits] : no focal deficits [Oriented To Time, Place, And Person] : oriented to person, place, and time [Affect] : the affect was normal [FreeTextEntry1] : insp wheezing in bases, some rhonchi b/l

## 2019-02-26 NOTE — HISTORY OF PRESENT ILLNESS
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 Last seen on 1/22/2019. Pt shortly thereafter was admitted to [facility] from 2/3-13. Sputum grew pseudomonas resistant to fluoroquinolones, which was evaluated by ID and thought to probably be due to colonization. She completed a course of Zosyn and Bactrim (previously had grown S maltophilia) as well as prednisone 40 mg x 5 days. AFB done during hospitalization - NGTD. \par \par Upon discharge and return home, she felt well the first day (last dose of prednisone) but shortly thereafter felt SOB, increased cough with sputum. Reports compliance with airway clearance, including chest vest, albuterol nebs, aerobika. Currently taking symbicort, performist bid. Completed inhaled tobra and no longer taking azithro. Pt describes dyspnea with walking to the bathroom. Wheezing improved with albuterol nebs. Cough productive of thick, yellowish sputum. Otherwise denies fevers, chills, chest pain, abdominal pain, nausea/vomiting, diarrhea, b/l LE edema, dizziness, sore throat. Today she is accompanied by her daughter. \par \par PFTs 11/2017: FEV1 39%, FVC 43%, FEV1/FVC 69%, no bronchodilatory change; TLC 84%, %, ERV 41%, RV/TLC 68%, DLCO 66% \par TTE: none \par Prev lab showed IgE 9 12/2017, Aspergillus neg, T cells normal, no eosinophilia on CBC, alpha one antitrypsin 157 WNL, mold profile negative. Mo binding lectin was 98 (normal is >100) CF mutations negative in December. \par Sweat chloride – quantity insufficient. 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

## 2019-03-13 ENCOUNTER — RX RENEWAL (OUTPATIENT)
Age: 64
End: 2019-03-13

## 2019-03-18 LAB
ACID FAST STN SPT: SIGNIFICANT CHANGE UP
ACID FAST STN SPT: SIGNIFICANT CHANGE UP

## 2019-03-20 LAB — ACID FAST STN SPT: SIGNIFICANT CHANGE UP

## 2019-03-21 ENCOUNTER — APPOINTMENT (OUTPATIENT)
Dept: PULMONOLOGY | Facility: CLINIC | Age: 64
End: 2019-03-21
Payer: MEDICAID

## 2019-03-21 VITALS
DIASTOLIC BLOOD PRESSURE: 74 MMHG | WEIGHT: 103 LBS | TEMPERATURE: 97.7 F | OXYGEN SATURATION: 90 % | RESPIRATION RATE: 17 BRPM | SYSTOLIC BLOOD PRESSURE: 139 MMHG | BODY MASS INDEX: 18.25 KG/M2 | HEART RATE: 69 BPM

## 2019-03-21 PROCEDURE — 99214 OFFICE O/P EST MOD 30 MIN: CPT

## 2019-03-21 NOTE — PHYSICAL EXAM
[Normal Appearance] : normal appearance [General Appearance - In No Acute Distress] : no acute distress [Neck Appearance] : the appearance of the neck was normal [Heart Rate And Rhythm] : heart rate and rhythm were normal [Heart Sounds] : normal S1 and S2 [Murmurs] : no murmurs present [Exaggerated Use Of Accessory Muscles For Inspiration] : no accessory muscle use [Bowel Sounds] : normal bowel sounds [Abdomen Soft] : soft [Abdomen Tenderness] : non-tender [Abnormal Walk] : normal gait [Nail Clubbing] : no clubbing of the fingernails [Cyanosis, Localized] : no localized cyanosis [Skin Turgor] : normal skin turgor [] : no rash [Motor Exam] : the motor exam was normal [No Focal Deficits] : no focal deficits [Oriented To Time, Place, And Person] : oriented to person, place, and time [Affect] : the affect was normal [Normal Oropharynx] : normal oropharynx [FreeTextEntry1] : scattered inspiratory rales, occasional end expiratory wheeze bilaterlaly.  Improves post cough

## 2019-03-21 NOTE — REVIEW OF SYSTEMS
[Fatigue] : fatigue [Poor Appetite] : poor appetite [Cough] : cough [Sputum] : sputum  [Dyspnea] : dyspnea [Chronically Infected with _____] : chronically infected with [unfilled] [Wheezing] : wheezing [Negative] : Endocrine [Hemoptysis] : no hemoptysis [Pleuritic Pain] : no pleuritic pain

## 2019-03-21 NOTE — HISTORY OF PRESENT ILLNESS
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 Mo binding lectin was 98 (normal is >100) CF mutations negative in December. \par Sweat chloride – quantity insufficient. Recommended CF genetics -tests expanded CF panel and Duplications and deletions have been sent previously and noted to be negative. 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 2/2019 grew Pseudomonas resistant to Cipro and levaquin but Sensitive to Gentamycin.  REcent Sinus CT showed sinusitis with air fluid levels.  \par \par She cannot tolerate the Bethkis- states that it causes breathing difficulty and feels as if mucous gets stuck.  Continues to experience cough productive of increased mucous, and dyspnea.  Denies sinus symptoms although sinus CT in hospital showed opacified sinuses throughout.  \par She is using budesonide/perforomist twice daily via nebulizer and albuterol 1-2x per day with Acapella valve  and chest vest for clearaince.  This has helped her.  Denies fever or night sweats.  Weight is stable.

## 2019-03-21 NOTE — ASSESSMENT
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 Last admitted to [facility] from 2/3-13. Sputum grew pseudomonas resistant to fluoroquinolones, which was evaluated by ID and thought to probably be due to colonization. She completed a course of Zosyn and Bactrim (previously had grown S maltophilia). Last seen 2/26/19 and placed back on regimen of ICS/LABA via nebulizer with airway clearance techniques including chest vest, flutter valve and albuterol  She cannot tolerate Bethkis.  She is growing a pseudomonas resistant to levaquin and Cipro, sensitive to Gentamycin.  Explained importance of using inhaled antibioitc to son and patient.  She does not want to keep using.  \par \par On exam today continues to have inspiratory rales and cough.  \par \par - airway clearance: aerobika, chest vest, albuterol \par - COPD: perforomist bid, budesonide bid, albuterol prn;\par -refer to ENT for sinus disease maangement\par - repeat CT Chest\par \par Case d/w Dr. Young

## 2019-04-03 ENCOUNTER — APPOINTMENT (OUTPATIENT)
Dept: CT IMAGING | Facility: IMAGING CENTER | Age: 64
End: 2019-04-03

## 2019-05-01 ENCOUNTER — MEDICATION RENEWAL (OUTPATIENT)
Age: 64
End: 2019-05-01

## 2019-05-01 LAB
BASOPHILS # BLD AUTO: 0.09 K/UL
BASOPHILS NFR BLD AUTO: 1 %
CYSTIC FIBROSIS DELETION OR DUPLICATION: NORMAL
EOSINOPHIL # BLD AUTO: 0.51 K/UL
EOSINOPHIL NFR BLD AUTO: 5.9 %
HCT VFR BLD CALC: 34.6 %
HGB BLD-MCNC: 10 G/DL
IMM GRANULOCYTES NFR BLD AUTO: 0.3 %
LYMPHOCYTES # BLD AUTO: 1.86 K/UL
LYMPHOCYTES NFR BLD AUTO: 21.5 %
MAN DIFF?: NORMAL
MCHC RBC-ENTMCNC: 21.4 PG
MCHC RBC-ENTMCNC: 28.9 GM/DL
MCV RBC AUTO: 74.1 FL
MISCELLANEOUS TEST: NORMAL
MONOCYTES # BLD AUTO: 0.96 K/UL
MONOCYTES NFR BLD AUTO: 11.1 %
NEUTROPHILS # BLD AUTO: 5.19 K/UL
NEUTROPHILS NFR BLD AUTO: 60.2 %
PLATELET # BLD AUTO: 317 K/UL
PROC NAME: NORMAL
RBC # BLD: 4.67 M/UL
RBC # FLD: 19.4 %
TOTAL IGE SMQN RAST: 81 KU/L
WBC # FLD AUTO: 8.64 K/UL

## 2019-05-03 ENCOUNTER — RESULT REVIEW (OUTPATIENT)
Age: 64
End: 2019-05-03

## 2019-05-03 NOTE — ED PROVIDER NOTE - ENMT, MLM
Dr. Idris Hewitt, NP  
Airway patent, Nasal mucosa clear. Mouth with normal mucosa. Throat has no vesicles, no oropharyngeal exudates and uvula is midline.

## 2019-05-13 DIAGNOSIS — R13.13 DYSPHAGIA, PHARYNGEAL PHASE: ICD-10-CM

## 2019-05-14 ENCOUNTER — APPOINTMENT (OUTPATIENT)
Dept: CT IMAGING | Facility: IMAGING CENTER | Age: 64
End: 2019-05-14
Payer: MEDICAID

## 2019-05-14 ENCOUNTER — OUTPATIENT (OUTPATIENT)
Dept: OUTPATIENT SERVICES | Facility: HOSPITAL | Age: 64
LOS: 1 days | End: 2019-05-14
Payer: MEDICAID

## 2019-05-14 DIAGNOSIS — Z90.49 ACQUIRED ABSENCE OF OTHER SPECIFIED PARTS OF DIGESTIVE TRACT: Chronic | ICD-10-CM

## 2019-05-14 DIAGNOSIS — J47.1 BRONCHIECTASIS WITH (ACUTE) EXACERBATION: ICD-10-CM

## 2019-05-14 PROCEDURE — 71250 CT THORAX DX C-: CPT | Mod: 26

## 2019-05-14 PROCEDURE — 71250 CT THORAX DX C-: CPT

## 2019-05-20 ENCOUNTER — OUTPATIENT (OUTPATIENT)
Dept: OUTPATIENT SERVICES | Facility: HOSPITAL | Age: 64
LOS: 1 days | Discharge: HOME | End: 2019-05-20

## 2019-05-20 ENCOUNTER — APPOINTMENT (OUTPATIENT)
Dept: OTOLARYNGOLOGY | Facility: CLINIC | Age: 64
End: 2019-05-20
Payer: MEDICAID

## 2019-05-20 VITALS
HEART RATE: 75 BPM | DIASTOLIC BLOOD PRESSURE: 84 MMHG | WEIGHT: 103 LBS | SYSTOLIC BLOOD PRESSURE: 155 MMHG | HEIGHT: 63 IN | BODY MASS INDEX: 18.25 KG/M2

## 2019-05-20 DIAGNOSIS — Z90.49 ACQUIRED ABSENCE OF OTHER SPECIFIED PARTS OF DIGESTIVE TRACT: Chronic | ICD-10-CM

## 2019-05-20 PROCEDURE — 31231 NASAL ENDOSCOPY DX: CPT

## 2019-05-20 PROCEDURE — 99204 OFFICE O/P NEW MOD 45 MIN: CPT | Mod: 25

## 2019-05-20 NOTE — REASON FOR VISIT
[Family Member] : family member [FreeTextEntry2] : Patient  son states mother have problem with breathing

## 2019-05-20 NOTE — PROCEDURE
[Topical Lidocaine] : topical lidocaine [Oxymetazoline HCl] : oxymetazoline HCl [Rigid Endoscope] : examined with a rigid endoscope [FreeTextEntry6] : left mid septal spur\par bilateral inferior turbinate hypertrophy\par yellow mucus in right middle meatus\par right sphenoethmoidal recess clear\par nasopharynx clear\par thin scant mucus in left middle meatus\par mucusa pink and moist\par no polyps\par no purulence\par  [de-identified] : nasal congestion

## 2019-05-20 NOTE — REVIEW OF SYSTEMS
[Sneezing] : sneezing [Seasonal Allergies] : seasonal allergies [Dizziness] : dizziness [Vertigo] : vertigo [Recurrent Sinus Infections] : recurrent sinus infections [Sinus Pain] : sinus pain [Sinus Pressure] : sinus pressure [Sense Of Smell Problem] : sense of smell problem [Discolored Nasal Discharge] : discolored nasal discharge [Throat Pain] : throat pain [Eye Pain] : eye pain [Eyes Itch] : itching of the eyes [Itching] : itching [Muscle Weakness] : muscle weakness [Swollen Glands] : swollen glands [Negative] : Psychiatric

## 2019-05-20 NOTE — HISTORY OF PRESENT ILLNESS
[de-identified] : Patient is a 64 year old female referred by Dr. Young (pulmonology) with history of diffuse cystic bronchiectasis (Stenotrophomonas colonization) and primary cililary dyskinesia who presents for evaluation of sinusitis and nasal congestion. She is seen with her son who helped translate. She reports bilateral nasal congestion that occurs throughout the day but worse at night for the past year. She has clear to yellowish thin drainage from both nasal cavities. She has post-nasal drip. She reports pressure in the bilateral maxillary and frontal regions. She reports headaches. Smell and taste are intact. Occasionally has eye pruritus. \par \par She has tried using fluticasone which she uses twice daily every day for the past year without improvement in symptoms. Denies history of acute sinusitis. Denies sinus surgery.\par \par She was hospitalized in February 2019 - sputum culture grew Pseudomonas resistant to Ciprofloxacin and levaquin.  A CT sinus from that admission shows pansinus mucosal thickening.

## 2019-05-20 NOTE — CONSULT LETTER
[Dear  ___] : Dear  [unfilled], [Consult Letter:] : I had the pleasure of evaluating your patient, [unfilled]. [Please see my note below.] : Please see my note below. [Consult Closing:] : Thank you very much for allowing me to participate in the care of this patient.  If you have any questions, please do not hesitate to contact me. [Sincerely,] : Sincerely, [FreeTextEntry2] : Dr Shelli Young [FreeTextEntry3] : \par Jose Medrano MD, FACS\par \par Otolaryngology-Head and Neck Surgery\par [employer] [facility]\par

## 2019-05-30 NOTE — DISCHARGE NOTE ADULT - PHYSICIAN SECTION COMPLETE
Addended by: Cayla Espitia on: 5/30/2019 02:08 PM     Modules accepted: Orders
Addended by: Sheryl  on: 5/30/2019 02:06 PM     Modules accepted: Orders
Addended by: Shonna Marie on: 5/30/2019 01:57 PM     Modules accepted: Orders
Yes

## 2019-06-03 DIAGNOSIS — J32.9 CHRONIC SINUSITIS, UNSPECIFIED: ICD-10-CM

## 2019-06-03 DIAGNOSIS — J34.89 OTHER SPECIFIED DISORDERS OF NOSE AND NASAL SINUSES: ICD-10-CM

## 2019-07-01 ENCOUNTER — TRANSCRIPTION ENCOUNTER (OUTPATIENT)
Age: 64
End: 2019-07-01

## 2019-07-01 ENCOUNTER — APPOINTMENT (OUTPATIENT)
Dept: OTOLARYNGOLOGY | Facility: CLINIC | Age: 64
End: 2019-07-01
Payer: MEDICAID

## 2019-07-01 VITALS — BODY MASS INDEX: 18.25 KG/M2 | WEIGHT: 103 LBS | HEIGHT: 63 IN

## 2019-07-01 PROCEDURE — 31231 NASAL ENDOSCOPY DX: CPT

## 2019-07-01 PROCEDURE — 99213 OFFICE O/P EST LOW 20 MIN: CPT | Mod: 25

## 2019-07-01 RX ORDER — SODIUM CHLORIDE/SODIUM BICARB
PACKET (EA) NASAL
Qty: 1 | Refills: 2 | Status: ACTIVE | COMMUNITY
Start: 2019-07-01 | End: 1900-01-01

## 2019-07-01 NOTE — HISTORY OF PRESENT ILLNESS
[de-identified] : Patient is a 64 year old female referred by Dr. Young (pulmonology) with history of diffuse cystic bronchiectasis (Stenotrophomonas colonization) and primary cililary dyskinesia who presents for evaluation of sinusitis and nasal congestion. She is seen with her son who helped translate. She reports bilateral nasal congestion that occurs throughout the day but worse at night for the past year. She has clear to yellowish thin drainage from both nasal cavities. She has post-nasal drip. She reports pressure in the bilateral maxillary and frontal regions. She reports headaches. Smell and taste are intact. Occasionally has eye pruritus. \par \par She has tried using fluticasone which she uses twice daily every day for the past year without improvement in symptoms. Denies history of acute sinusitis. Denies sinus surgery.\par \par She was hospitalized in February 2019 - sputum culture grew Pseudomonas resistant to Ciprofloxacin and levaquin. A CT sinus from that admission shows pansinus mucosal thickening. \par  \par Interval History - the patient reports that she has been using her nasal saline rinses and nasal spray. She reports no changes in her symptoms - continues to have bilateral nasal congestion. Continues to have mucus from bilateral nasal cavities.

## 2019-07-01 NOTE — PROCEDURE
[Anterior rhinoscopy insufficient to account for symptoms] : anterior rhinoscopy insufficient to account for symptoms [Topical Lidocaine] : topical lidocaine [Oxymetazoline HCl] : oxymetazoline HCl [Rigid Endoscope] : examined with a rigid endoscope

## 2019-07-01 NOTE — REASON FOR VISIT
[Subsequent Evaluation] : a subsequent evaluation for [Nasal Obstruction] : nasal obstruction [Other: _____] : [unfilled]

## 2019-07-08 LAB — BACTERIA FLD CULT: ABNORMAL

## 2019-07-09 DIAGNOSIS — Q34.8 OTHER SPECIFIED CONGENITAL MALFORMATIONS OF RESPIRATORY SYSTEM: ICD-10-CM

## 2019-07-09 DIAGNOSIS — J47.9 BRONCHIECTASIS, UNCOMPLICATED: ICD-10-CM

## 2019-07-22 ENCOUNTER — APPOINTMENT (OUTPATIENT)
Dept: OTOLARYNGOLOGY | Facility: CLINIC | Age: 64
End: 2019-07-22

## 2019-07-24 NOTE — ED PROVIDER NOTE - NSCAREINITIATED _GEN_ER
Coide called from [facility] in [place], and would like patient to be seen by Dr. Anderson soon then 08/23.   Mylene Mancera(PA)

## 2019-08-01 LAB — FUNGUS FLD CULT: NORMAL

## 2019-08-12 ENCOUNTER — APPOINTMENT (OUTPATIENT)
Dept: OTOLARYNGOLOGY | Facility: CLINIC | Age: 64
End: 2019-08-12

## 2019-09-10 ENCOUNTER — RX RENEWAL (OUTPATIENT)
Age: 64
End: 2019-09-10

## 2019-09-18 ENCOUNTER — RX RENEWAL (OUTPATIENT)
Age: 64
End: 2019-09-18

## 2019-10-10 ENCOUNTER — EMERGENCY (EMERGENCY)
Facility: HOSPITAL | Age: 64
LOS: 1 days | Discharge: HOME | End: 2019-10-10
Attending: STUDENT IN AN ORGANIZED HEALTH CARE EDUCATION/TRAINING PROGRAM
Payer: MEDICAID

## 2019-10-10 VITALS
DIASTOLIC BLOOD PRESSURE: 69 MMHG | TEMPERATURE: 98 F | OXYGEN SATURATION: 94 % | SYSTOLIC BLOOD PRESSURE: 140 MMHG | HEART RATE: 82 BPM | RESPIRATION RATE: 15 BRPM

## 2019-10-10 DIAGNOSIS — Z90.49 ACQUIRED ABSENCE OF OTHER SPECIFIED PARTS OF DIGESTIVE TRACT: Chronic | ICD-10-CM

## 2019-10-10 PROCEDURE — 99282 EMERGENCY DEPT VISIT SF MDM: CPT

## 2019-10-10 RX ORDER — ACETAMINOPHEN 500 MG
975 TABLET ORAL ONCE
Refills: 0 | Status: COMPLETED | OUTPATIENT
Start: 2019-10-10 | End: 2019-10-10

## 2019-10-10 RX ADMIN — Medication 975 MILLIGRAM(S): at 23:23

## 2019-10-10 NOTE — ED PROVIDER NOTE - NSFOLLOWUPCLINICS_GEN_ALL_ED_FT
[facility]  Ophthalmology  [address] [place] [address] [id number]  [place] [address]  Phone: [phone]  Fax:   Follow Up Time:

## 2019-10-10 NOTE — ED PROVIDER NOTE - PHYSICAL EXAMINATION
GEN APPEARANCE: WDWN, alert and cooperative, non-toxic appearing and in NAD  HEAD: Atraumatic, normocephalic   EYES: PERRLa, EOMI, vision grossly intact. L small area of sub conjunctival hemorrhage, and mild lateral subconjunctival injection, mild R medical subconjunctival injection. no pain with EOM   EARS: Gross hearing intact.   NOSE: No nasal discharge, no external evidence of epistaxis.   THROAT: MMM. Oral cavity and pharynx normal. No inflammation, no swelling, no exudate, no oral lesions.  NECK: Supple  CV: RRR, S1S2, no c/r/m/g. No cyanosis or pallor. Extremities warm, well perfused. Cap refill <2 seconds. No bruits.   LUNGS: CTAB. No wheezing. No rales. No rhonchi. No diminished breath sounds.   ABDOMEN: Soft, NTND. No guarding or rebound. No masses.   MSK: Spine appears normal, no spine point tenderness. No CVA ttp. No joint erythema or tenderness. Normal muscular development. Pelvis stable.  EXTREMITIES: No peripheral edema. No obvious joint or bony deformity.  NEURO: Alert, follows commands. Weight bearing normal. Speech normal. Sensation and motor normal x4 extremities.   SKIN: Normal color for race, warm, dry and intact. No evidence of rash.  PSYCH: Normal mood and affect.

## 2019-10-10 NOTE — ED PROVIDER NOTE - ATTENDING CONTRIBUTION TO CARE
I performed a history and physical exam of the patient and discussed their management with the resident.  I reviewed the resident's note and agree with the documented findings and plan of care except as noted below. My medical decision making and observations are as follows:    64F hx of DM presents with a cc of b/l redness to eyes inially on L now on R x5 days, reports redness to L started first then moved to R, no pain.  Eyes feel "itchy" and patient notes clear tearing from eyes.  Not exacerbated by EOM, no changes in vision, c/o mild L>R b/l HA, no meds prior to ED arrival, was coughing earlier in week, however since no congestion runny nose or sore throat, no trauma to eye.  No f/c.  Pt tried abx eye drops she had left over earlier today without relief.  Eyes appear with some conjunctival injection b/l with small area of subconjunctival hemorrhage on left lateral eye, EOMI, no facial swelling.  heart rrr, lungs cta.  Will recommend artifical tears or allergy drops and out patient follow up.

## 2019-10-10 NOTE — ED PROVIDER NOTE - PMH
ABPA (allergic bronchopulmonary aspergillosis)    Allergic rhinitis    Bronchiectasis    DM (diabetes mellitus)    GERD (gastroesophageal reflux disease)    Hypertension    Microcytic anemia    Postnasal drip    Pseudomonas aeruginosa colonization    Recurrent pneumonia    Vitamin D deficiency

## 2019-10-10 NOTE — ED PROVIDER NOTE - CLINICAL SUMMARY MEDICAL DECISION MAKING FREE TEXT BOX
Jefe PGY3: 64F hx of DM presents with a cc of b/l redness to eyes inially on L now on R x5 days, reports redness to L started first then moved to R, no pain however feels as if something is "stuck behind eye" and if "something is there" not exacerbated by EOM, no changes in vision, c/o mild L>R b/l HA, no meds prior to exam vss non toxic well appearing eye findings as above ddc viral conjunctivitis vs sub conjuct. hemorrhage, low c/f temp arteritis or acute glaucoma will give apap, reassess

## 2019-10-10 NOTE — ED PROVIDER NOTE - NSFOLLOWUPINSTRUCTIONS_ED_ALL_ED_FT
- You may try artificial tears or allergy eye drops for symptoms relief - follow dosing instructions on packaging.   - Follow up with your primary care physician within 2-3days for reevaluation.  - Return to the Emergency Department for worsening, progressive or any other concerning symptoms.

## 2019-10-10 NOTE — ED PROVIDER NOTE - OBJECTIVE STATEMENT
64F hx of DM presents with a cc of b/l redness to eyes inially on L now on R x5 days, reports redness to L started first then moved to R, no pain however feels as if something is "stuck behind eye" and if "something is there" not exacerbated by EOM, no changes in vision, c/o mild L>R b/l HA, no meds prior to ED arrival, was coughing earlier in week, however since no congestion runny nose or sore throat, no trauma to eye, +watery discharge from L eye. No jaw pain or claudication.  Denies n/v/f/c/cp/sob. Denies syncope, lightheadedness, dizziness. Denies chest palpitations, abdominal pain. Denies dysuria, hematuria, hematochezia, BRBPR, tarry stools, diarrhea, constipation. 64F hx of DM presents with a cc of b/l redness to eyes inially on L now on R x5 days, reports redness to L started first then moved to R, no pain however feels as if something is "stuck behind eye" not exacerbated by EOM, no changes in vision, c/o mild L>R b/l HA, no meds prior to ED arrival, was coughing earlier in week, however since no congestion runny nose or sore throat, no trauma to eye, +watery discharge from L eye. No jaw pain or claudication.  Denies n/v/f/c/cp/sob. Denies syncope, lightheadedness, dizziness. Denies chest palpitations, abdominal pain. Denies dysuria, hematuria, hematochezia, BRBPR, tarry stools, diarrhea, constipation.

## 2019-10-10 NOTE — ED PROVIDER NOTE - PATIENT PORTAL LINK FT
You can access the FollowMyHealth Patient Portal offered by [facility] by registering at the following website: [email or web address]. By joining [employer]’s FollowMyHealth portal, you will also be able to view your health information using other applications (apps) compatible with our system.

## 2019-10-18 NOTE — H&P ADULT - ENMT
[details… detailed exam mouth/pharynx

## 2019-12-23 ENCOUNTER — MEDICATION RENEWAL (OUTPATIENT)
Age: 64
End: 2019-12-23

## 2020-01-14 ENCOUNTER — INPATIENT (INPATIENT)
Facility: HOSPITAL | Age: 65
LOS: 5 days | Discharge: HOME | End: 2020-01-20
Attending: INTERNAL MEDICINE | Admitting: INTERNAL MEDICINE
Payer: MEDICAID

## 2020-01-14 ENCOUNTER — APPOINTMENT (OUTPATIENT)
Dept: PULMONOLOGY | Facility: CLINIC | Age: 65
End: 2020-01-14
Payer: MEDICAID

## 2020-01-14 VITALS
HEIGHT: 65.5 IN | SYSTOLIC BLOOD PRESSURE: 152 MMHG | WEIGHT: 103.62 LBS | RESPIRATION RATE: 20 BRPM | OXYGEN SATURATION: 90 % | TEMPERATURE: 100 F | DIASTOLIC BLOOD PRESSURE: 83 MMHG | HEART RATE: 111 BPM

## 2020-01-14 VITALS
TEMPERATURE: 97.1 F | HEART RATE: 102 BPM | DIASTOLIC BLOOD PRESSURE: 80 MMHG | HEIGHT: 63 IN | WEIGHT: 103 LBS | RESPIRATION RATE: 15 BRPM | OXYGEN SATURATION: 91 % | BODY MASS INDEX: 18.25 KG/M2 | SYSTOLIC BLOOD PRESSURE: 163 MMHG

## 2020-01-14 DIAGNOSIS — E11.9 TYPE 2 DIABETES MELLITUS WITHOUT COMPLICATIONS: ICD-10-CM

## 2020-01-14 DIAGNOSIS — J47.1 BRONCHIECTASIS WITH (ACUTE) EXACERBATION: ICD-10-CM

## 2020-01-14 DIAGNOSIS — Z29.9 ENCOUNTER FOR PROPHYLACTIC MEASURES, UNSPECIFIED: ICD-10-CM

## 2020-01-14 DIAGNOSIS — D69.6 THROMBOCYTOPENIA, UNSPECIFIED: ICD-10-CM

## 2020-01-14 DIAGNOSIS — Z90.49 ACQUIRED ABSENCE OF OTHER SPECIFIED PARTS OF DIGESTIVE TRACT: Chronic | ICD-10-CM

## 2020-01-14 DIAGNOSIS — Z86.19 PERSONAL HISTORY OF OTHER INFECTIOUS AND PARASITIC DISEASES: ICD-10-CM

## 2020-01-14 DIAGNOSIS — I10 ESSENTIAL (PRIMARY) HYPERTENSION: ICD-10-CM

## 2020-01-14 DIAGNOSIS — A41.9 SEPSIS, UNSPECIFIED ORGANISM: ICD-10-CM

## 2020-01-14 LAB
ALBUMIN SERPL ELPH-MCNC: 3.6 G/DL — SIGNIFICANT CHANGE UP (ref 3.3–5)
ALBUMIN SERPL ELPH-MCNC: 3.6 G/DL — SIGNIFICANT CHANGE UP (ref 3.3–5)
ALP SERPL-CCNC: 171 U/L — HIGH (ref 40–120)
ALP SERPL-CCNC: 191 U/L — HIGH (ref 40–120)
ALT FLD-CCNC: 112 U/L — HIGH (ref 4–33)
ALT FLD-CCNC: 78 U/L — HIGH (ref 4–33)
ANION GAP SERPL CALC-SCNC: 13 MMO/L — SIGNIFICANT CHANGE UP (ref 7–14)
ANION GAP SERPL CALC-SCNC: 8 MMO/L — SIGNIFICANT CHANGE UP (ref 7–14)
APTT BLD: 28.7 SEC — SIGNIFICANT CHANGE UP (ref 27.5–36.3)
AST SERPL-CCNC: 153 U/L — HIGH (ref 4–32)
AST SERPL-CCNC: 76 U/L — HIGH (ref 4–32)
B PERT DNA SPEC QL NAA+PROBE: NOT DETECTED — SIGNIFICANT CHANGE UP
BASE EXCESS BLDV CALC-SCNC: -0.4 MMOL/L — SIGNIFICANT CHANGE UP
BASOPHILS # BLD AUTO: 0.04 K/UL — SIGNIFICANT CHANGE UP (ref 0–0.2)
BASOPHILS NFR BLD AUTO: 0.3 % — SIGNIFICANT CHANGE UP (ref 0–2)
BILIRUB SERPL-MCNC: 0.7 MG/DL — SIGNIFICANT CHANGE UP (ref 0.2–1.2)
BILIRUB SERPL-MCNC: 0.7 MG/DL — SIGNIFICANT CHANGE UP (ref 0.2–1.2)
BLD GP AB SCN SERPL QL: NEGATIVE — SIGNIFICANT CHANGE UP
BLOOD GAS VENOUS - CREATININE: 0.48 MG/DL — LOW (ref 0.5–1.3)
BUN SERPL-MCNC: 15 MG/DL — SIGNIFICANT CHANGE UP (ref 7–23)
BUN SERPL-MCNC: 17 MG/DL — SIGNIFICANT CHANGE UP (ref 7–23)
C PNEUM DNA SPEC QL NAA+PROBE: NOT DETECTED — SIGNIFICANT CHANGE UP
CALCIUM SERPL-MCNC: 9.1 MG/DL — SIGNIFICANT CHANGE UP (ref 8.4–10.5)
CALCIUM SERPL-MCNC: 9.1 MG/DL — SIGNIFICANT CHANGE UP (ref 8.4–10.5)
CHLORIDE BLDV-SCNC: 100 MMOL/L — SIGNIFICANT CHANGE UP (ref 96–108)
CHLORIDE SERPL-SCNC: 95 MMOL/L — LOW (ref 98–107)
CHLORIDE SERPL-SCNC: 98 MMOL/L — SIGNIFICANT CHANGE UP (ref 98–107)
CO2 SERPL-SCNC: 21 MMOL/L — LOW (ref 22–31)
CO2 SERPL-SCNC: 22 MMOL/L — SIGNIFICANT CHANGE UP (ref 22–31)
CREAT SERPL-MCNC: 0.32 MG/DL — LOW (ref 0.5–1.3)
CREAT SERPL-MCNC: 0.56 MG/DL — SIGNIFICANT CHANGE UP (ref 0.5–1.3)
EOSINOPHIL # BLD AUTO: 0.05 K/UL — SIGNIFICANT CHANGE UP (ref 0–0.5)
EOSINOPHIL NFR BLD AUTO: 0.3 % — SIGNIFICANT CHANGE UP (ref 0–6)
FLUAV H1 2009 PAND RNA SPEC QL NAA+PROBE: NOT DETECTED — SIGNIFICANT CHANGE UP
FLUAV H1 RNA SPEC QL NAA+PROBE: NOT DETECTED — SIGNIFICANT CHANGE UP
FLUAV H3 RNA SPEC QL NAA+PROBE: NOT DETECTED — SIGNIFICANT CHANGE UP
FLUAV SUBTYP SPEC NAA+PROBE: NOT DETECTED — SIGNIFICANT CHANGE UP
FLUBV RNA SPEC QL NAA+PROBE: NOT DETECTED — SIGNIFICANT CHANGE UP
GAS PNL BLDV: 134 MMOL/L — LOW (ref 136–146)
GLUCOSE BLDC GLUCOMTR-MCNC: 231 MG/DL — HIGH (ref 70–99)
GLUCOSE BLDC GLUCOMTR-MCNC: 272 MG/DL — HIGH (ref 70–99)
GLUCOSE BLDC GLUCOMTR-MCNC: 277 MG/DL — HIGH (ref 70–99)
GLUCOSE BLDV-MCNC: 323 MG/DL — HIGH (ref 70–99)
GLUCOSE SERPL-MCNC: 251 MG/DL — HIGH (ref 70–99)
GLUCOSE SERPL-MCNC: 327 MG/DL — HIGH (ref 70–99)
GRAM STN SPT: SIGNIFICANT CHANGE UP
HADV DNA SPEC QL NAA+PROBE: NOT DETECTED — SIGNIFICANT CHANGE UP
HCO3 BLDV-SCNC: 24 MMOL/L — SIGNIFICANT CHANGE UP (ref 20–27)
HCOV PNL SPEC NAA+PROBE: SIGNIFICANT CHANGE UP
HCT VFR BLD CALC: 44.5 % — SIGNIFICANT CHANGE UP (ref 34.5–45)
HCT VFR BLDV CALC: 41.2 % — SIGNIFICANT CHANGE UP (ref 34.5–45)
HGB BLD-MCNC: 14.1 G/DL — SIGNIFICANT CHANGE UP (ref 11.5–15.5)
HGB BLDV-MCNC: 13.4 G/DL — SIGNIFICANT CHANGE UP (ref 11.5–15.5)
HMPV RNA SPEC QL NAA+PROBE: NOT DETECTED — SIGNIFICANT CHANGE UP
HPIV1 RNA SPEC QL NAA+PROBE: NOT DETECTED — SIGNIFICANT CHANGE UP
HPIV2 RNA SPEC QL NAA+PROBE: NOT DETECTED — SIGNIFICANT CHANGE UP
HPIV3 RNA SPEC QL NAA+PROBE: NOT DETECTED — SIGNIFICANT CHANGE UP
HPIV4 RNA SPEC QL NAA+PROBE: NOT DETECTED — SIGNIFICANT CHANGE UP
IMM GRANULOCYTES NFR BLD AUTO: 0.5 % — SIGNIFICANT CHANGE UP (ref 0–1.5)
INR BLD: 1.16 — SIGNIFICANT CHANGE UP (ref 0.88–1.17)
LACTATE BLDV-MCNC: 1.8 MMOL/L — SIGNIFICANT CHANGE UP (ref 0.5–2)
LYMPHOCYTES # BLD AUTO: 1.13 K/UL — SIGNIFICANT CHANGE UP (ref 1–3.3)
LYMPHOCYTES # BLD AUTO: 7.6 % — LOW (ref 13–44)
MAGNESIUM SERPL-MCNC: 1.8 MG/DL — SIGNIFICANT CHANGE UP (ref 1.6–2.6)
MAGNESIUM SERPL-MCNC: 2 MG/DL — SIGNIFICANT CHANGE UP (ref 1.6–2.6)
MCHC RBC-ENTMCNC: 27.6 PG — SIGNIFICANT CHANGE UP (ref 27–34)
MCHC RBC-ENTMCNC: 31.7 % — LOW (ref 32–36)
MCV RBC AUTO: 87.1 FL — SIGNIFICANT CHANGE UP (ref 80–100)
MONOCYTES # BLD AUTO: 1.07 K/UL — HIGH (ref 0–0.9)
MONOCYTES NFR BLD AUTO: 7.2 % — SIGNIFICANT CHANGE UP (ref 2–14)
NEUTROPHILS # BLD AUTO: 12.57 K/UL — HIGH (ref 1.8–7.4)
NEUTROPHILS NFR BLD AUTO: 84.1 % — HIGH (ref 43–77)
NRBC # FLD: 0 K/UL — SIGNIFICANT CHANGE UP (ref 0–0)
PCO2 BLDV: 39 MMHG — LOW (ref 41–51)
PH BLDV: 7.4 PH — SIGNIFICANT CHANGE UP (ref 7.32–7.43)
PHOSPHATE SERPL-MCNC: 1.8 MG/DL — LOW (ref 2.5–4.5)
PHOSPHATE SERPL-MCNC: SIGNIFICANT CHANGE UP MG/DL (ref 2.5–4.5)
PLATELET # BLD AUTO: 126 K/UL — LOW (ref 150–400)
PMV BLD: 11.5 FL — SIGNIFICANT CHANGE UP (ref 7–13)
PO2 BLDV: 69 MMHG — HIGH (ref 35–40)
POTASSIUM BLDV-SCNC: 3.7 MMOL/L — SIGNIFICANT CHANGE UP (ref 3.4–4.5)
POTASSIUM SERPL-MCNC: 3.9 MMOL/L — SIGNIFICANT CHANGE UP (ref 3.5–5.3)
POTASSIUM SERPL-MCNC: SIGNIFICANT CHANGE UP MMOL/L (ref 3.5–5.3)
POTASSIUM SERPL-SCNC: 3.9 MMOL/L — SIGNIFICANT CHANGE UP (ref 3.5–5.3)
POTASSIUM SERPL-SCNC: SIGNIFICANT CHANGE UP MMOL/L (ref 3.5–5.3)
PROT SERPL-MCNC: 6.9 G/DL — SIGNIFICANT CHANGE UP (ref 6–8.3)
PROT SERPL-MCNC: SIGNIFICANT CHANGE UP G/DL (ref 6–8.3)
PROTHROM AB SERPL-ACNC: 12.9 SEC — SIGNIFICANT CHANGE UP (ref 9.8–13.1)
RBC # BLD: 5.11 M/UL — SIGNIFICANT CHANGE UP (ref 3.8–5.2)
RBC # FLD: 14.5 % — SIGNIFICANT CHANGE UP (ref 10.3–14.5)
RH IG SCN BLD-IMP: POSITIVE — SIGNIFICANT CHANGE UP
RSV RNA SPEC QL NAA+PROBE: NOT DETECTED — SIGNIFICANT CHANGE UP
RV+EV RNA SPEC QL NAA+PROBE: NOT DETECTED — SIGNIFICANT CHANGE UP
SAO2 % BLDV: 94.1 % — HIGH (ref 60–85)
SODIUM SERPL-SCNC: 124 MMOL/L — LOW (ref 135–145)
SODIUM SERPL-SCNC: 133 MMOL/L — LOW (ref 135–145)
SPECIMEN SOURCE: SIGNIFICANT CHANGE UP
WBC # BLD: 14.93 K/UL — HIGH (ref 3.8–10.5)
WBC # FLD AUTO: 14.93 K/UL — HIGH (ref 3.8–10.5)

## 2020-01-14 PROCEDURE — 99214 OFFICE O/P EST MOD 30 MIN: CPT

## 2020-01-14 PROCEDURE — 71045 X-RAY EXAM CHEST 1 VIEW: CPT | Mod: 26

## 2020-01-14 PROCEDURE — 93010 ELECTROCARDIOGRAM REPORT: CPT

## 2020-01-14 PROCEDURE — 99223 1ST HOSP IP/OBS HIGH 75: CPT | Mod: GC

## 2020-01-14 RX ORDER — BUDESONIDE AND FORMOTEROL FUMARATE DIHYDRATE 160; 4.5 UG/1; UG/1
2 AEROSOL RESPIRATORY (INHALATION)
Refills: 0 | Status: DISCONTINUED | OUTPATIENT
Start: 2020-01-14 | End: 2020-01-20

## 2020-01-14 RX ORDER — METFORMIN HYDROCHLORIDE 850 MG/1
1 TABLET ORAL
Qty: 0 | Refills: 0 | DISCHARGE

## 2020-01-14 RX ORDER — DEXTROSE 50 % IN WATER 50 %
25 SYRINGE (ML) INTRAVENOUS ONCE
Refills: 0 | Status: DISCONTINUED | OUTPATIENT
Start: 2020-01-14 | End: 2020-01-20

## 2020-01-14 RX ORDER — TOBRAMYCIN SULFATE 40 MG/ML
4 VIAL (ML) INJECTION
Qty: 0 | Refills: 0 | DISCHARGE

## 2020-01-14 RX ORDER — FLUTICASONE PROPIONATE 50 MCG
1 SPRAY, SUSPENSION NASAL
Refills: 0 | Status: DISCONTINUED | OUTPATIENT
Start: 2020-01-14 | End: 2020-01-20

## 2020-01-14 RX ORDER — SODIUM CHLORIDE 9 MG/ML
1000 INJECTION, SOLUTION INTRAVENOUS ONCE
Refills: 0 | Status: COMPLETED | OUTPATIENT
Start: 2020-01-14 | End: 2020-01-14

## 2020-01-14 RX ORDER — FERROUS SULFATE 325(65) MG
325 TABLET ORAL DAILY
Refills: 0 | Status: DISCONTINUED | OUTPATIENT
Start: 2020-01-14 | End: 2020-01-20

## 2020-01-14 RX ORDER — DEXTROSE 50 % IN WATER 50 %
15 SYRINGE (ML) INTRAVENOUS ONCE
Refills: 0 | Status: DISCONTINUED | OUTPATIENT
Start: 2020-01-14 | End: 2020-01-20

## 2020-01-14 RX ORDER — AMLODIPINE BESYLATE 2.5 MG/1
5 TABLET ORAL DAILY
Refills: 0 | Status: DISCONTINUED | OUTPATIENT
Start: 2020-01-14 | End: 2020-01-20

## 2020-01-14 RX ORDER — PREGABALIN 225 MG/1
1 CAPSULE ORAL
Qty: 0 | Refills: 0 | DISCHARGE

## 2020-01-14 RX ORDER — LANOLIN ALCOHOL/MO/W.PET/CERES
3 CREAM (GRAM) TOPICAL
Qty: 0 | Refills: 0 | DISCHARGE

## 2020-01-14 RX ORDER — DEXTROSE 50 % IN WATER 50 %
12.5 SYRINGE (ML) INTRAVENOUS ONCE
Refills: 0 | Status: DISCONTINUED | OUTPATIENT
Start: 2020-01-14 | End: 2020-01-20

## 2020-01-14 RX ORDER — INSULIN GLARGINE 100 [IU]/ML
10 INJECTION, SOLUTION SUBCUTANEOUS AT BEDTIME
Refills: 0 | Status: DISCONTINUED | OUTPATIENT
Start: 2020-01-14 | End: 2020-01-15

## 2020-01-14 RX ORDER — FLUTICASONE FUROATE AND VILANTEROL TRIFENATATE 100; 25 UG/1; UG/1
1 POWDER RESPIRATORY (INHALATION)
Qty: 0 | Refills: 0 | DISCHARGE

## 2020-01-14 RX ORDER — LOSARTAN POTASSIUM 100 MG/1
1 TABLET, FILM COATED ORAL
Qty: 0 | Refills: 0 | DISCHARGE

## 2020-01-14 RX ORDER — PIPERACILLIN AND TAZOBACTAM 4; .5 G/20ML; G/20ML
3.38 INJECTION, POWDER, LYOPHILIZED, FOR SOLUTION INTRAVENOUS ONCE
Refills: 0 | Status: COMPLETED | OUTPATIENT
Start: 2020-01-14 | End: 2020-01-14

## 2020-01-14 RX ORDER — IPRATROPIUM/ALBUTEROL SULFATE 18-103MCG
3 AEROSOL WITH ADAPTER (GRAM) INHALATION EVERY 6 HOURS
Refills: 0 | Status: DISCONTINUED | OUTPATIENT
Start: 2020-01-14 | End: 2020-01-20

## 2020-01-14 RX ORDER — BUDESONIDE, MICRONIZED 100 %
0.5 POWDER (GRAM) MISCELLANEOUS EVERY 12 HOURS
Refills: 0 | Status: DISCONTINUED | OUTPATIENT
Start: 2020-01-14 | End: 2020-01-14

## 2020-01-14 RX ORDER — FORMOTEROL FUMARATE 12 MCG
2 CAPSULE, WITH INHALATION DEVICE INHALATION
Qty: 0 | Refills: 0 | DISCHARGE

## 2020-01-14 RX ORDER — GLUCAGON INJECTION, SOLUTION 0.5 MG/.1ML
1 INJECTION, SOLUTION SUBCUTANEOUS ONCE
Refills: 0 | Status: DISCONTINUED | OUTPATIENT
Start: 2020-01-14 | End: 2020-01-20

## 2020-01-14 RX ORDER — ACETAMINOPHEN 500 MG
1000 TABLET ORAL ONCE
Refills: 0 | Status: COMPLETED | OUTPATIENT
Start: 2020-01-14 | End: 2020-01-14

## 2020-01-14 RX ORDER — PIPERACILLIN AND TAZOBACTAM 4; .5 G/20ML; G/20ML
3.38 INJECTION, POWDER, LYOPHILIZED, FOR SOLUTION INTRAVENOUS EVERY 8 HOURS
Refills: 0 | Status: DISCONTINUED | OUTPATIENT
Start: 2020-01-14 | End: 2020-01-17

## 2020-01-14 RX ORDER — SODIUM CHLORIDE 9 MG/ML
1000 INJECTION, SOLUTION INTRAVENOUS
Refills: 0 | Status: DISCONTINUED | OUTPATIENT
Start: 2020-01-14 | End: 2020-01-20

## 2020-01-14 RX ORDER — SIMETHICONE 80 MG/1
80 TABLET, CHEWABLE ORAL ONCE
Refills: 0 | Status: COMPLETED | OUTPATIENT
Start: 2020-01-14 | End: 2020-01-14

## 2020-01-14 RX ORDER — MONTELUKAST 4 MG/1
10 TABLET, CHEWABLE ORAL DAILY
Refills: 0 | Status: DISCONTINUED | OUTPATIENT
Start: 2020-01-14 | End: 2020-01-20

## 2020-01-14 RX ORDER — FOLIC ACID 0.8 MG
1 TABLET ORAL
Qty: 0 | Refills: 0 | DISCHARGE

## 2020-01-14 RX ORDER — SODIUM CHLORIDE 9 MG/ML
0 INJECTION, SOLUTION INTRAVENOUS
Qty: 0 | Refills: 0 | DISCHARGE

## 2020-01-14 RX ORDER — INSULIN LISPRO 100/ML
VIAL (ML) SUBCUTANEOUS
Refills: 0 | Status: DISCONTINUED | OUTPATIENT
Start: 2020-01-14 | End: 2020-01-17

## 2020-01-14 RX ORDER — INSULIN LISPRO 100/ML
VIAL (ML) SUBCUTANEOUS AT BEDTIME
Refills: 0 | Status: DISCONTINUED | OUTPATIENT
Start: 2020-01-14 | End: 2020-01-17

## 2020-01-14 RX ADMIN — PIPERACILLIN AND TAZOBACTAM 25 GRAM(S): 4; .5 INJECTION, POWDER, LYOPHILIZED, FOR SOLUTION INTRAVENOUS at 21:54

## 2020-01-14 RX ADMIN — SIMETHICONE 80 MILLIGRAM(S): 80 TABLET, CHEWABLE ORAL at 18:26

## 2020-01-14 RX ADMIN — Medication 1000 MILLIGRAM(S): at 19:00

## 2020-01-14 RX ADMIN — Medication 325 MILLIGRAM(S): at 15:25

## 2020-01-14 RX ADMIN — INSULIN GLARGINE 10 UNIT(S): 100 INJECTION, SOLUTION SUBCUTANEOUS at 21:07

## 2020-01-14 RX ADMIN — Medication 6: at 17:05

## 2020-01-14 RX ADMIN — SODIUM CHLORIDE 1000 MILLILITER(S): 9 INJECTION, SOLUTION INTRAVENOUS at 16:49

## 2020-01-14 RX ADMIN — Medication 40 MILLIGRAM(S): at 15:25

## 2020-01-14 RX ADMIN — Medication 3 MILLILITER(S): at 22:11

## 2020-01-14 RX ADMIN — AMLODIPINE BESYLATE 5 MILLIGRAM(S): 2.5 TABLET ORAL at 15:25

## 2020-01-14 RX ADMIN — MONTELUKAST 10 MILLIGRAM(S): 4 TABLET, CHEWABLE ORAL at 15:25

## 2020-01-14 RX ADMIN — Medication 4: at 15:31

## 2020-01-14 RX ADMIN — PIPERACILLIN AND TAZOBACTAM 200 GRAM(S): 4; .5 INJECTION, POWDER, LYOPHILIZED, FOR SOLUTION INTRAVENOUS at 15:24

## 2020-01-14 RX ADMIN — Medication 400 MILLIGRAM(S): at 18:26

## 2020-01-14 RX ADMIN — Medication 1 SPRAY(S): at 17:02

## 2020-01-14 RX ADMIN — Medication 3 MILLILITER(S): at 15:26

## 2020-01-14 RX ADMIN — Medication 1: at 21:07

## 2020-01-14 RX ADMIN — BUDESONIDE AND FORMOTEROL FUMARATE DIHYDRATE 2 PUFF(S): 160; 4.5 AEROSOL RESPIRATORY (INHALATION) at 22:14

## 2020-01-14 NOTE — H&P ADULT - NSICDXFAMILYHX_GEN_ALL_CORE_FT
FAMILY HISTORY:  Father  Still living? Unknown  Family history of bronchitis, Age at diagnosis: Age Unknown  Family history of diabetes mellitus, Age at diagnosis: Age Unknown    Mother  Still living? Unknown  Family history of bronchitis, Age at diagnosis: Age Unknown    Child  Still living? Unknown  Family history of allergic rhinitis, Age at diagnosis: Age Unknown  Family history of allergic rhinitis, Age at diagnosis: Age Unknown

## 2020-01-14 NOTE — ASSESSMENT
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 Last admitted to [facility] from 2/3-13. Sputum grew pseudomonas resistant to fluoroquinolones, which was evaluated by ID and thought to probably be due to colonization. She completed a course of Zosyn and Bactrim (previously had grown S maltophilia).She was evaluated by ENT in July for pansinusitis on CT and culture showed sensitive pseudomonas.  Did not follow up with him.  \par \par Today she appears to be experiencing an exacerbation of her bronchiectasis  I am concerned by her fever, and history of resisitance- will require IV antibioitcs.  Oxygen saturation is low today and she cannot walk.\par \par PLan:\par \par 1- Will admit to [facility] for IV antibioitcs, Chest imaging and bronchodilators.  Sputum for C and S sent.  Will need RVP.  \par F/U with me after hospitalization.  Discussed with Dr. Gustafson in the [facility].\par \par On exam today continues to have inspiratory rales and cough.  \par \par - airway clearance: aerobika, chest vest, albuterol \par - COPD: perforomist bid, budesonide bid, albuterol prn;\par -refer to ENT for sinus disease maangement\par - repeat CT Chest\par \par Case d/w Dr. Young

## 2020-01-14 NOTE — PHYSICAL EXAM
[Normal Appearance] : normal appearance [General Appearance - In No Acute Distress] : no acute distress [Normal Oropharynx] : normal oropharynx [Neck Appearance] : the appearance of the neck was normal [Heart Rate And Rhythm] : heart rate and rhythm were normal [Heart Sounds] : normal S1 and S2 [Murmurs] : no murmurs present [Exaggerated Use Of Accessory Muscles For Inspiration] : no accessory muscle use [Bowel Sounds] : normal bowel sounds [Abdomen Soft] : soft [Abnormal Walk] : normal gait [Abdomen Tenderness] : non-tender [Nail Clubbing] : no clubbing of the fingernails [Cyanosis, Localized] : no localized cyanosis [] : no rash [Motor Exam] : the motor exam was normal [Skin Turgor] : normal skin turgor [Oriented To Time, Place, And Person] : oriented to person, place, and time [No Focal Deficits] : no focal deficits [Affect] : the affect was normal [FreeTextEntry1] : scattered inspiratory rales,  end expiratory wheeze bilaterlaly.

## 2020-01-14 NOTE — H&P ADULT - NSICDXPASTMEDICALHX_GEN_ALL_CORE_FT
PAST MEDICAL HISTORY:  ABPA (allergic bronchopulmonary aspergillosis)     Allergic rhinitis     Bronchiectasis     DM (diabetes mellitus)     GERD (gastroesophageal reflux disease)     Hypertension     Microcytic anemia     Postnasal drip     Pseudomonas aeruginosa colonization     Recurrent pneumonia     Vitamin D deficiency

## 2020-01-14 NOTE — REVIEW OF SYSTEMS
[Fatigue] : fatigue [Cough] : cough [Poor Appetite] : poor appetite [Sputum] : sputum  [Dyspnea] : dyspnea [Wheezing] : wheezing [Chronically Infected with _____] : chronically infected with [unfilled] [Negative] : Endocrine [Pleuritic Pain] : no pleuritic pain [Hemoptysis] : no hemoptysis

## 2020-01-14 NOTE — H&P ADULT - NSHPREVIEWOFSYSTEMS_GEN_ALL_CORE
CONSTITUTIONAL: +Fever, +malaise, no weight loss  NEUROLOGICAL: No headache or focal weakness   SKIN: No rash or pruritus  EYES: No visual disturbances or diplopia  ENT: No nasal congestion, rhinorrhea, or sore throat  NECK: No neck pain or stiffness  RESPIRATORY: +S  CARDIOVASCULAR: No chest pain or palpitations  GASTROINTESTINAL: No abdominal pain, nausea, vomiting, diarrhea, melena, or hematochezia   GENITOURINARY: No dysuria, hematuria, or flank pain  MUSCULOSKELATAL: No joint or back pain   HEMATOLOGIC: No easy bleeding or bruising   All other review of systems is negative unless indicated above CONSTITUTIONAL: +Fever, +malaise, no weight loss  NEUROLOGICAL: No headache or focal weakness   SKIN: No rash or pruritus  EYES: No visual disturbances or diplopia  ENT: No nasal congestion, rhinorrhea, or sore throat  NECK: No neck pain or stiffness  RESPIRATORY: +Shortness of breath, +productive cough,   CARDIOVASCULAR: No chest pain or palpitations  GASTROINTESTINAL: No abdominal pain, nausea, vomiting, diarrhea, melena, or hematochezia   GENITOURINARY: No dysuria, hematuria, or flank pain  MUSCULOSKELATAL: No joint or back pain   HEMATOLOGIC: No easy bleeding or bruising   All other review of systems is negative unless indicated above

## 2020-01-14 NOTE — H&P ADULT - PROBLEM SELECTOR PLAN 7
Holding pharmacologic DVT ppx given thrombocytopenia, will start tomorrow if plt stable. No evidence of bleeding.

## 2020-01-14 NOTE — H&P ADULT - NSHPPHYSICALEXAM_GEN_ALL_CORE
General: NAD  Skin: Warm and dry, no rashes  Neuro: AAOx3, nonfocal  HEENT: PERRL, EOMI, no oral lesions  Neck: Full range of motion, no thyromehaly or JVD  Lymph: No cervical, axillary, or supraclavicular lymphadenopathy  Lungs: Clear to ascultation bilatereally, no wheezes, rales, or rhonchi   Heart: Regular rate and rhythm, no murmurs  Abdomen: Normoactive bowl sounds, soft, nontender, nondistended  Extremities: No lower extremity tenderness, erythema, or edema   Psych: normal mood and affect General: NAD  Skin: Warm and dry, no rashes  Neuro: AAOx3, nonfocal  HEENT: PERRL, EOMI, no oral lesions  Neck: Full range of motion, no thyromehaly or JVD  Lymph: No cervical, axillary, or supraclavicular lymphadenopathy  Lungs: Bilateral rhonchi, no wheezes or rales  Heart: Regular rate and rhythm, no murmurs  Abdomen: Normoactive bowl sounds, soft, nontender, nondistended  Extremities: No lower extremity tenderness, erythema, or edema   Psych: normal mood and affect

## 2020-01-14 NOTE — H&P ADULT - ASSESSMENT
64F with diffuse cystitic bronchiectasis (pseduomonas and stenotrophomonas on previous cultures) intermittently on prednisone, primary ciliary dyskinesia, history of ABPA, HTN, DM, anxiety/depression presents with fever and shortness of breath x1 week in the setting of progressively worsening cough productive of green sputum, admitted for exacerbation of bronchiectasis. 64F with diffuse cystitic bronchiectasis (pseduomonas and stenotrophomonas on previous cultures, intermittently uses home O2) intermittently on prednisone, primary ciliary dyskinesia, history of ABPA, HTN, DM, anxiety/depression presents with fever and shortness of breath x1 week in the setting of progressively worsening cough productive of green sputum, admitted for exacerbation of bronchiectasis.

## 2020-01-14 NOTE — H&P ADULT - PROBLEM SELECTOR PLAN 2
Due to exacerbation of bronchiectasis  - Antibiotic coverage with zosyn as above  - Follow up cultures  - Will give 1L LR for tachycardia, another 1L if sodium WNL

## 2020-01-14 NOTE — HISTORY OF PRESENT ILLNESS
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 Mo binding lectin was 98 (normal is >100) CF mutations negative in December. \par Sweat chloride – quantity insufficient. Recommended CF genetics -tests expanded CF panel and Duplications and deletions have been sent previously and noted to be negative. 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 2/2019 grew Pseudomonas resistant to Cipro and levaquin but Sensitive to Gentamycin.  REcent Sinus CT showed sinusitis with air fluid levels.  Sinus cultures taken by Dr. Singh in July showed pan sensitive Pseudomonals.  \par \par She cannot tolerate the Bethkis- states that it causes breathing difficulty and feels as if mucous gets stuck.  \par \par [place] in accompanied by daughter.  Has had chills for 5-6 days, fever for 2 days.  Last night was 102.  Has body aches/pain.  Feels short of breath, is coughing, bringing up yellow green sputum.  \par she is using budesonide /formoterol and symbicort.  Albuterol via nebulizer ventolin as needed.  Also took some prednsione from [facility] about 10 days ago.

## 2020-01-14 NOTE — H&P ADULT - PROBLEM SELECTOR PLAN 1
Concern for pseudomonal respiratory infection given recent positive cultures. RVP negative.  - Continue zosyn for coverage of pseudomonas  - If stenotrophomonas grows out, will consider adding fluoroquinolone vs bactrim  - Prednisone 40 mg daily, will taper once clinically improves  - Follow up blood cultures, sputum cultures, urine legionella  - O2 support to maintain spO2 > 92% Concern for pseudomonal respiratory infection given recent positive cultures. RVP negative. POCUS a-lines anteriorly, b-lines laterally but no consolidations. CXR ordered.   - Continue zosyn for coverage of pseudomonas  - If stenotrophomonas grows out, will consider adding fluoroquinolone vs bactrim  - Prednisone 40 mg daily, will taper once clinically improves  - Follow up blood cultures, sputum cultures, urine legionella  - O2 support to maintain spO2 > 92%

## 2020-01-14 NOTE — H&P ADULT - PROBLEM SELECTOR PLAN 3
Insulin dependent  - Will give lantus 10u tonight, decreased from home 14u basaglar  - Holding novolog 5u TID, will place on sliding scale  - HgnA1c  - Holding metformin  - Holding glimeperide, please do not restart on dc

## 2020-01-14 NOTE — H&P ADULT - HISTORY OF PRESENT ILLNESS
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  Accompanied by son today.    PFTs 11/2017: FEV1 39%, FVC 43%, FEV1/FVC 69%, no bronchodilatory change; TLC 84%, %, ERV 41%, RV/TLC 68%, DLCO 66%  TTE: none  Prev lab showed IgE 9 12/2017, Aspergillus neg, T cells normal, no eosinophilia on CBC, alpha one antitrypsin 157 WNL, mold profile negative. Mo binding lectin was 98 (normal is >100) CF mutations negative in December.  Sweat chloride – quantity insufficient. Recommended CF genetics-tests expanded CF panel and Duplications and deletions have been sent previously and noted to be negative. Recent rre mutatiion panel was cancelled.    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 2/2019 grew Pseudomonas resistant to Cipro and levaquin but Sensitive to Gentamycin. REcent Sinus CT showed sinusitis with air fluid levels.     She cannot tolerate the Bethkis- states that it causes breathing difficulty and feels as if mucous gets stuck. Continues to experience cough productive of increased mucous, and dyspnea. Denies sinus symptoms although sinus CT in hospital showed opacified sinuses throughout.   She is using budesonide/perforomist twice daily via nebulizer and albuterol 1-2x per day with Acapella valve and chest vest for clearaince. This has helped her. Denies fever or night sweats. Weight is stable.     N[place]ol[place] in accompanied by daughter. Has had chills for 5-6 days, fever for 2 days. Last night was 102. Has body aches/pain. Feels short of breath, is coughing, bringing up yellow green sputum.   she is using budesonide /formoterol and symbicort. Albuterol via nebulizer ventolin as needed. 64F with diffuse cystitic bronchiectasis (pseduomonas and stenotrophomonas on previous cultures) intermittently on prednisone, primary ciliary dyskinesia, history of ABPA, HTN, DM, anxiety/depression presents with fever and shortness of breath x1 week in the setting of progressively worsening cough productive of green sputum. At her baseline, pt is able to walk 1 block and has a cough productive of clear sputum. Three weeks ago, she developed worsened respiratory symptoms and treated herself with a 10 day prednisone taper and 17 days of augmentin, both of which she had home. Pt says she felt better for about 1 week only to develop progressively worsening respiratory symptoms. The fevers and green sputum production started about 1 week ago. Pt went to see her pulmonologist Dr. Young today who recommended direct admission for exacerbation of bronchiectasis. Previous PFTs were suggestive of obstructive disease also with decreased FVC and mildly decreased DLCO. 64F with diffuse cystitic bronchiectasis (pseduomonas and stenotrophomonas on previous cultures) intermittently on prednisone, primary ciliary dyskinesia, history of ABPA, HTN, DM, anxiety/depression presents with fever and shortness of breath x1 week in the setting of progressively worsening cough productive of green sputum. At her baseline, pt is able to walk 1 block and has a cough productive of clear sputum. Three weeks ago, she developed worsened respiratory symptoms and treated herself with a 10 day prednisone taper and 17 days of augmentin, both of which she had home. Pt says she felt better for about 1 week only to develop progressively worsening respiratory symptoms. The fevers and green sputum production started about 1 week ago. Pt went to see her pulmonologist Dr. Young today who recommended direct admission for exacerbation of bronchiectasis. Previous PFTs were suggestive of obstructive disease also with decreased FVC and mildly decreased DLCO.     Upon admission to the [facility], she was febrile to 100.8, tachycardic, hypertensive, hypoxemic to 91% on RA.

## 2020-01-15 LAB
ANION GAP SERPL CALC-SCNC: 11 MMO/L — SIGNIFICANT CHANGE UP (ref 7–14)
BASOPHILS # BLD AUTO: 0.05 K/UL — SIGNIFICANT CHANGE UP (ref 0–0.2)
BASOPHILS NFR BLD AUTO: 0.4 % — SIGNIFICANT CHANGE UP (ref 0–2)
BUN SERPL-MCNC: 23 MG/DL — SIGNIFICANT CHANGE UP (ref 7–23)
CALCIUM SERPL-MCNC: 8.8 MG/DL — SIGNIFICANT CHANGE UP (ref 8.4–10.5)
CHLORIDE SERPL-SCNC: 103 MMOL/L — SIGNIFICANT CHANGE UP (ref 98–107)
CO2 SERPL-SCNC: 22 MMOL/L — SIGNIFICANT CHANGE UP (ref 22–31)
CREAT SERPL-MCNC: 0.52 MG/DL — SIGNIFICANT CHANGE UP (ref 0.5–1.3)
EOSINOPHIL # BLD AUTO: 0.01 K/UL — SIGNIFICANT CHANGE UP (ref 0–0.5)
EOSINOPHIL NFR BLD AUTO: 0.1 % — SIGNIFICANT CHANGE UP (ref 0–6)
GLUCOSE BLDC GLUCOMTR-MCNC: 200 MG/DL — HIGH (ref 70–99)
GLUCOSE BLDC GLUCOMTR-MCNC: 288 MG/DL — HIGH (ref 70–99)
GLUCOSE BLDC GLUCOMTR-MCNC: 305 MG/DL — HIGH (ref 70–99)
GLUCOSE BLDC GLUCOMTR-MCNC: 334 MG/DL — HIGH (ref 70–99)
GLUCOSE SERPL-MCNC: 231 MG/DL — HIGH (ref 70–99)
HBA1C BLD-MCNC: 8.9 % — HIGH (ref 4–5.6)
HBV CORE AB SER-ACNC: NONREACTIVE — SIGNIFICANT CHANGE UP
HBV SURFACE AB SER-ACNC: NONREACTIVE — SIGNIFICANT CHANGE UP
HCT VFR BLD CALC: 38.3 % — SIGNIFICANT CHANGE UP (ref 34.5–45)
HCV AB S/CO SERPL IA: 1.17 S/CO — HIGH (ref 0–0.99)
HCV AB SERPL-IMP: SIGNIFICANT CHANGE UP
HGB BLD-MCNC: 12.3 G/DL — SIGNIFICANT CHANGE UP (ref 11.5–15.5)
IMM GRANULOCYTES NFR BLD AUTO: 0.5 % — SIGNIFICANT CHANGE UP (ref 0–1.5)
LYMPHOCYTES # BLD AUTO: 1.05 K/UL — SIGNIFICANT CHANGE UP (ref 1–3.3)
LYMPHOCYTES # BLD AUTO: 9.1 % — LOW (ref 13–44)
MAGNESIUM SERPL-MCNC: 2.1 MG/DL — SIGNIFICANT CHANGE UP (ref 1.6–2.6)
MCHC RBC-ENTMCNC: 27.6 PG — SIGNIFICANT CHANGE UP (ref 27–34)
MCHC RBC-ENTMCNC: 32.1 % — SIGNIFICANT CHANGE UP (ref 32–36)
MCV RBC AUTO: 85.9 FL — SIGNIFICANT CHANGE UP (ref 80–100)
MONOCYTES # BLD AUTO: 0.72 K/UL — SIGNIFICANT CHANGE UP (ref 0–0.9)
MONOCYTES NFR BLD AUTO: 6.2 % — SIGNIFICANT CHANGE UP (ref 2–14)
NEUTROPHILS # BLD AUTO: 9.65 K/UL — HIGH (ref 1.8–7.4)
NEUTROPHILS NFR BLD AUTO: 83.7 % — HIGH (ref 43–77)
NRBC # FLD: 0 K/UL — SIGNIFICANT CHANGE UP (ref 0–0)
PHOSPHATE SERPL-MCNC: 2.8 MG/DL — SIGNIFICANT CHANGE UP (ref 2.5–4.5)
PLATELET # BLD AUTO: 105 K/UL — LOW (ref 150–400)
PMV BLD: 11.4 FL — SIGNIFICANT CHANGE UP (ref 7–13)
POTASSIUM SERPL-MCNC: 4 MMOL/L — SIGNIFICANT CHANGE UP (ref 3.5–5.3)
POTASSIUM SERPL-SCNC: 4 MMOL/L — SIGNIFICANT CHANGE UP (ref 3.5–5.3)
RBC # BLD: 4.46 M/UL — SIGNIFICANT CHANGE UP (ref 3.8–5.2)
RBC # FLD: 14.4 % — SIGNIFICANT CHANGE UP (ref 10.3–14.5)
SODIUM SERPL-SCNC: 136 MMOL/L — SIGNIFICANT CHANGE UP (ref 135–145)
SPECIMEN SOURCE: SIGNIFICANT CHANGE UP
SPECIMEN SOURCE: SIGNIFICANT CHANGE UP
WBC # BLD: 11.54 K/UL — HIGH (ref 3.8–10.5)
WBC # FLD AUTO: 11.54 K/UL — HIGH (ref 3.8–10.5)

## 2020-01-15 PROCEDURE — 99233 SBSQ HOSP IP/OBS HIGH 50: CPT | Mod: GC

## 2020-01-15 RX ORDER — INSULIN GLARGINE 100 [IU]/ML
14 INJECTION, SOLUTION SUBCUTANEOUS AT BEDTIME
Refills: 0 | Status: DISCONTINUED | OUTPATIENT
Start: 2020-01-15 | End: 2020-01-17

## 2020-01-15 RX ORDER — SIMETHICONE 80 MG/1
80 TABLET, CHEWABLE ORAL THREE TIMES A DAY
Refills: 0 | Status: DISCONTINUED | OUTPATIENT
Start: 2020-01-15 | End: 2020-01-20

## 2020-01-15 RX ORDER — INSULIN LISPRO 100/ML
4 VIAL (ML) SUBCUTANEOUS
Refills: 0 | Status: DISCONTINUED | OUTPATIENT
Start: 2020-01-15 | End: 2020-01-17

## 2020-01-15 RX ADMIN — Medication 1 SPRAY(S): at 17:24

## 2020-01-15 RX ADMIN — BUDESONIDE AND FORMOTEROL FUMARATE DIHYDRATE 2 PUFF(S): 160; 4.5 AEROSOL RESPIRATORY (INHALATION) at 22:58

## 2020-01-15 RX ADMIN — Medication 3 MILLILITER(S): at 22:58

## 2020-01-15 RX ADMIN — PIPERACILLIN AND TAZOBACTAM 25 GRAM(S): 4; .5 INJECTION, POWDER, LYOPHILIZED, FOR SOLUTION INTRAVENOUS at 05:57

## 2020-01-15 RX ADMIN — BUDESONIDE AND FORMOTEROL FUMARATE DIHYDRATE 2 PUFF(S): 160; 4.5 AEROSOL RESPIRATORY (INHALATION) at 10:18

## 2020-01-15 RX ADMIN — PIPERACILLIN AND TAZOBACTAM 25 GRAM(S): 4; .5 INJECTION, POWDER, LYOPHILIZED, FOR SOLUTION INTRAVENOUS at 21:20

## 2020-01-15 RX ADMIN — Medication 3 MILLILITER(S): at 15:56

## 2020-01-15 RX ADMIN — Medication 3 MILLILITER(S): at 10:17

## 2020-01-15 RX ADMIN — Medication 40 MILLIGRAM(S): at 05:57

## 2020-01-15 RX ADMIN — Medication 100 MILLIGRAM(S): at 21:20

## 2020-01-15 RX ADMIN — Medication 6: at 08:14

## 2020-01-15 RX ADMIN — SIMETHICONE 80 MILLIGRAM(S): 80 TABLET, CHEWABLE ORAL at 21:20

## 2020-01-15 RX ADMIN — Medication 1 SPRAY(S): at 05:56

## 2020-01-15 RX ADMIN — Medication 8: at 17:23

## 2020-01-15 RX ADMIN — PIPERACILLIN AND TAZOBACTAM 25 GRAM(S): 4; .5 INJECTION, POWDER, LYOPHILIZED, FOR SOLUTION INTRAVENOUS at 13:48

## 2020-01-15 RX ADMIN — Medication 325 MILLIGRAM(S): at 12:13

## 2020-01-15 RX ADMIN — Medication 4 UNIT(S): at 17:24

## 2020-01-15 RX ADMIN — MONTELUKAST 10 MILLIGRAM(S): 4 TABLET, CHEWABLE ORAL at 12:12

## 2020-01-15 RX ADMIN — Medication 3 MILLILITER(S): at 04:05

## 2020-01-15 RX ADMIN — Medication 8: at 12:12

## 2020-01-15 RX ADMIN — INSULIN GLARGINE 14 UNIT(S): 100 INJECTION, SOLUTION SUBCUTANEOUS at 21:19

## 2020-01-15 NOTE — PROGRESS NOTE ADULT - SUBJECTIVE AND OBJECTIVE BOX
CHIEF COMPLAINT:    SUBJECTIVE:     [ ] All other systems negative  [ ] Unable to assess ROS because ________    OBJECTIVE:  ICU Vital Signs Last 24 Hrs  T(C): 36.7 (15 Antione 2020 05:49), Max: 38.2 (14 Jan 2020 17:25)  T(F): 98 (15 Antione 2020 05:49), Max: 100.8 (14 Jan 2020 17:25)  HR: 87 (15 Antione 2020 05:49) (72 - 112)  BP: 106/66 (15 Antione 2020 05:49) (106/66 - 155/86)  BP(mean): --  ABP: --  ABP(mean): --  RR: 18 (15 Antione 2020 05:49) (18 - 20)  SpO2: 94% (15 Antione 2020 05:49) (90% - 99%)        CAPILLARY BLOOD GLUCOSE      POCT Blood Glucose.: 288 mg/dL (15 Antione 2020 07:53)      PHYSICAL EXAM:  General:   HEENT:   Lymph Nodes:  Neck:   Respiratory:   Cardiovascular:   Abdomen:   Extremities:   Skin:   Neurological:  Psychiatry:    HOSPITAL MEDICATIONS:  MEDICATIONS  (STANDING):  albuterol/ipratropium for Nebulization 3 milliLiter(s) Nebulizer every 6 hours  amLODIPine   Tablet 5 milliGRAM(s) Oral daily  budesonide 160 MICROgram(s)/formoterol 4.5 MICROgram(s) Inhaler 2 Puff(s) Inhalation two times a day  dextrose 5%. 1000 milliLiter(s) (50 mL/Hr) IV Continuous <Continuous>  dextrose 50% Injectable 12.5 Gram(s) IV Push once  dextrose 50% Injectable 25 Gram(s) IV Push once  dextrose 50% Injectable 25 Gram(s) IV Push once  ferrous    sulfate 325 milliGRAM(s) Oral daily  fluticasone propionate 50 MICROgram(s)/spray Nasal Spray 1 Spray(s) Both Nostrils two times a day  insulin glargine Injectable (LANTUS) 10 Unit(s) SubCutaneous at bedtime  insulin lispro (HumaLOG) corrective regimen sliding scale   SubCutaneous three times a day before meals  insulin lispro (HumaLOG) corrective regimen sliding scale   SubCutaneous at bedtime  montelukast 10 milliGRAM(s) Oral daily  piperacillin/tazobactam IVPB.. 3.375 Gram(s) IV Intermittent every 8 hours  predniSONE   Tablet 40 milliGRAM(s) Oral daily    MEDICATIONS  (PRN):  dextrose 40% Gel 15 Gram(s) Oral once PRN Blood Glucose LESS THAN 70 milliGRAM(s)/deciliter  glucagon  Injectable 1 milliGRAM(s) IntraMuscular once PRN Glucose LESS THAN 70 milligrams/deciliter      LABS:                        12.3   11.54 )-----------( 105      ( 15 Antione 2020 05:45 )             38.3     01-15    136  |  103  |  23  ----------------------------<  231<H>  4.0   |  22  |  0.52    Ca    8.8      15 Antione 2020 05:45  Phos  2.8     01-15  Mg     2.1     01-15    TPro  6.9  /  Alb  3.6  /  TBili  0.7  /  DBili  x   /  AST  76<H>  /  ALT  78<H>  /  AlkPhos  191<H>  01-14    PT/INR - ( 14 Jan 2020 15:05 )   PT: 12.9 SEC;   INR: 1.16          PTT - ( 14 Jan 2020 15:05 )  PTT:28.7 SEC      Venous Blood Gas:  01-14 @ 16:45  7.40/39/69/24/94.1  VBG Lactate: 1.8      MICROBIOLOGY:     RADIOLOGY:  [ ] Reviewed and interpreted by me    PULMONARY FUNCTION TESTS:    EKG: CHIEF COMPLAINT: Patient is a 64y old  Female who presents with a chief complaint of Exacerbation of Bronchiectasis (15 Antione 2020 08:49)    SUBJECTIVE:   No interval events overnight. Patient noted mild palpitations with coughing spells and mild SOB. She denies fever, chills, chest pain, dyspnea, abdominal pain, N/V/D/C.   [x ] All other systems negative    OBJECTIVE:  ICU Vital Signs Last 24 Hrs  T(C): 36.7 (15 Antione 2020 05:49), Max: 38.2 (14 Jan 2020 17:25)  T(F): 98 (15 Antione 2020 05:49), Max: 100.8 (14 Jan 2020 17:25)  HR: 87 (15 Antione 2020 05:49) (72 - 112)  BP: 106/66 (15 Antione 2020 05:49) (106/66 - 155/86)  BP(mean): --  ABP: --  ABP(mean): --  RR: 18 (15 Antione 2020 05:49) (18 - 20)  SpO2: 94% (15 Antione 2020 05:49) (90% - 99%)    CAPILLARY BLOOD GLUCOSE  POCT Blood Glucose.: 288 mg/dL (15 Antione 2020 07:53)    PHYSICAL EXAM:  General: NAD   Cards: S1/S2, no murmurs   Pulm: (+) Rhonchis and Wheezes throughout.   Abdomen: Soft, NTND. BS (+)   Extremities: No pedal edema.   Neurology: AOx3, CN 2-12 intact. Sensation intact.     HOSPITAL MEDICATIONS:  MEDICATIONS  (STANDING):  albuterol/ipratropium for Nebulization 3 milliLiter(s) Nebulizer every 6 hours  amLODIPine   Tablet 5 milliGRAM(s) Oral daily  budesonide 160 MICROgram(s)/formoterol 4.5 MICROgram(s) Inhaler 2 Puff(s) Inhalation two times a day  dextrose 5%. 1000 milliLiter(s) (50 mL/Hr) IV Continuous <Continuous>  dextrose 50% Injectable 12.5 Gram(s) IV Push once  dextrose 50% Injectable 25 Gram(s) IV Push once  dextrose 50% Injectable 25 Gram(s) IV Push once  ferrous    sulfate 325 milliGRAM(s) Oral daily  fluticasone propionate 50 MICROgram(s)/spray Nasal Spray 1 Spray(s) Both Nostrils two times a day  insulin glargine Injectable (LANTUS) 10 Unit(s) SubCutaneous at bedtime  insulin lispro (HumaLOG) corrective regimen sliding scale   SubCutaneous three times a day before meals  insulin lispro (HumaLOG) corrective regimen sliding scale   SubCutaneous at bedtime  montelukast 10 milliGRAM(s) Oral daily  piperacillin/tazobactam IVPB.. 3.375 Gram(s) IV Intermittent every 8 hours  predniSONE   Tablet 40 milliGRAM(s) Oral daily    MEDICATIONS  (PRN):  dextrose 40% Gel 15 Gram(s) Oral once PRN Blood Glucose LESS THAN 70 milliGRAM(s)/deciliter  glucagon  Injectable 1 milliGRAM(s) IntraMuscular once PRN Glucose LESS THAN 70 milligrams/deciliter    LABS:                        12.3   11.54 )-----------( 105      ( 15 Antione 2020 05:45 )             38.3     01-15    136  |  103  |  23  ----------------------------<  231<H>  4.0   |  22  |  0.52    Ca    8.8      15 Antione 2020 05:45  Phos  2.8     01-15  Mg     2.1     01-15    TPro  6.9  /  Alb  3.6  /  TBili  0.7  /  DBili  x   /  AST  76<H>  /  ALT  78<H>  /  AlkPhos  191<H>  01-14    PT/INR - ( 14 Jan 2020 15:05 )   PT: 12.9 SEC;   INR: 1.16        PTT - ( 14 Jan 2020 15:05 )  PTT:28.7 SEC    Venous Blood Gas:  01-14 @ 16:45  7.40/39/69/24/94.1  VBG Lactate: 1.8    MICROBIOLOGY:     RADIOLOGY:  [ ] Reviewed and interpreted by me    PULMONARY FUNCTION TESTS:    EKG:

## 2020-01-15 NOTE — PROGRESS NOTE ADULT - ASSESSMENT
Ms. Sky is a 65 YO F with PMHx of diffuse cystitic bronchiectasis (pseduomonas and stenotrophomonas on previous cultures, intermittently uses home O2) intermittently on Prednisone, Primary Ciliary Dyskinesia, ABPA, HTN, DM2 and Anxiety/ Depression who presented with fever and shortness of breath x 1 week in the setting of progressively worsening cough productive of green sputum. Patient admitted for exacerbation of bronchiectasis. Ms. Sky is a 65 YO F with PMHx of diffuse cystitic bronchiectasis (pseduomonas and stenotrophomonas on previous cultures, intermittently uses home O2) intermittently on Prednisone, Primary Ciliary Dyskinesia, ABPA, HTN, DM2 and Anxiety/ Depression who presented with fever and shortness of breath x 1 week in the setting of progressively worsening cough productive of green sputum. Patient admitted for exacerbation of bronchiectasis.    # Acute on Chronic Bronchiectasis Exacerbation 2/2 BL Upper Lobe PNA   - CXR with BL upper lung field opacitiy (increased from prior) likely second to bronchiectasis howvever superimposed PNA cannot be ruled out.   - PCOUS with A LINES anteriorly and BLINES laterally.   - RVP with negative   - SCx with GPC   - BCx sent and pending   - Nebs standing Q6H. Sterodis with Prednisone 40mg PO QD (1/14 - TBD) continued.   - Home Singulair and Symbicort continued.   - Zosyn continued for now pending SCx speciation.     # Mild Thrombocytopenia   # History of Hepatitis B   -  on admission and decreasing slightly. Monitor for now.   - Hepatitis serologies pending.     # DM2 A1C 8.9   - Lantus 10 U QHS (home 14U QHS) and holding home Metformin and Novolog 5 U TID for now and contiued on ISS.   - Monitor FS ACHS for now.     # HTN   - Contine on Norvasc and started Lisinopril. Monitor BP.     # DVT PPX with SCD.     Mariah Owusu PA-C   Department of Medicine   [facility]   In House Pager [phone]/ [phone] Ms. Sky is a 63 YO F with PMHx of diffuse cystitic bronchiectasis (pseduomonas and stenotrophomonas on previous cultures, intermittently uses home O2) intermittently on Prednisone, Primary Ciliary Dyskinesia, ABPA, HTN, DM2 and Anxiety/ Depression who presented with fever and shortness of breath x 1 week in the setting of progressively worsening cough productive of green sputum. Patient admitted for exacerbation of bronchiectasis.    # Acute on Chronic Bronchiectasis Exacerbation 2/2 BL Upper Lobe PNA   - CXR with BL upper lung field opacitiy (increased from prior) likely second to bronchiectasis howvever superimposed PNA cannot be ruled out.   - PCOUS with A LINES anteriorly and BLINES laterally.   - RVP with negative   - SCx with GPC   - BCx sent and pending   - Nebs standing Q6H. Sterodis with Prednisone 40mg PO QD (1/14 - TBD) continued.   - Home Singulair and Symbicort continued.   - Zosyn continued for now pending SCx speciation.     # Mild Thrombocytopenia   # History of Hepatitis B   -  on admission and decreasing slightly. Monitor for now.   - Hepatitis serologies pending.     # DM2 A1C 8.9   - Lantus 14 U + ISS QHS and Lispro 4 U + ISS AC continued inpatient.   - Holding home Metformin.   - Monitor FS ACHS for now.     # HTN   - Contine on Norvasc and started Lisinopril. Monitor BP.     # DVT PPX with SCD.     Mariah Owusu PA-C   Department of Medicine   [facility]   In House Pager [phone]/ [phone]

## 2020-01-16 LAB
GLUCOSE BLDC GLUCOMTR-MCNC: 159 MG/DL — HIGH (ref 70–99)
GLUCOSE BLDC GLUCOMTR-MCNC: 218 MG/DL — HIGH (ref 70–99)
GLUCOSE BLDC GLUCOMTR-MCNC: 262 MG/DL — HIGH (ref 70–99)
GLUCOSE BLDC GLUCOMTR-MCNC: 333 MG/DL — HIGH (ref 70–99)
HCV RNA FLD QL NAA+PROBE: SIGNIFICANT CHANGE UP
HCV RNA SPEC QL PROBE+SIG AMP: NOT DETECTED — SIGNIFICANT CHANGE UP

## 2020-01-16 PROCEDURE — 99233 SBSQ HOSP IP/OBS HIGH 50: CPT | Mod: GC

## 2020-01-16 RX ORDER — CHLORHEXIDINE GLUCONATE 213 G/1000ML
1 SOLUTION TOPICAL DAILY
Refills: 0 | Status: DISCONTINUED | OUTPATIENT
Start: 2020-01-16 | End: 2020-01-20

## 2020-01-16 RX ORDER — ENOXAPARIN SODIUM 100 MG/ML
40 INJECTION SUBCUTANEOUS DAILY
Refills: 0 | Status: DISCONTINUED | OUTPATIENT
Start: 2020-01-16 | End: 2020-01-20

## 2020-01-16 RX ORDER — LANOLIN ALCOHOL/MO/W.PET/CERES
3 CREAM (GRAM) TOPICAL AT BEDTIME
Refills: 0 | Status: DISCONTINUED | OUTPATIENT
Start: 2020-01-16 | End: 2020-01-20

## 2020-01-16 RX ADMIN — Medication 40 MILLIGRAM(S): at 06:29

## 2020-01-16 RX ADMIN — Medication 4 UNIT(S): at 08:02

## 2020-01-16 RX ADMIN — CHLORHEXIDINE GLUCONATE 1 APPLICATION(S): 213 SOLUTION TOPICAL at 17:11

## 2020-01-16 RX ADMIN — Medication 1 SPRAY(S): at 17:02

## 2020-01-16 RX ADMIN — SIMETHICONE 80 MILLIGRAM(S): 80 TABLET, CHEWABLE ORAL at 15:02

## 2020-01-16 RX ADMIN — Medication 8: at 17:01

## 2020-01-16 RX ADMIN — Medication 4: at 12:07

## 2020-01-16 RX ADMIN — ENOXAPARIN SODIUM 40 MILLIGRAM(S): 100 INJECTION SUBCUTANEOUS at 17:11

## 2020-01-16 RX ADMIN — Medication 100 MILLIGRAM(S): at 22:28

## 2020-01-16 RX ADMIN — Medication 3 MILLIGRAM(S): at 22:16

## 2020-01-16 RX ADMIN — Medication 4 UNIT(S): at 17:02

## 2020-01-16 RX ADMIN — MONTELUKAST 10 MILLIGRAM(S): 4 TABLET, CHEWABLE ORAL at 11:01

## 2020-01-16 RX ADMIN — BUDESONIDE AND FORMOTEROL FUMARATE DIHYDRATE 2 PUFF(S): 160; 4.5 AEROSOL RESPIRATORY (INHALATION) at 22:13

## 2020-01-16 RX ADMIN — Medication 3 MILLILITER(S): at 11:05

## 2020-01-16 RX ADMIN — PIPERACILLIN AND TAZOBACTAM 25 GRAM(S): 4; .5 INJECTION, POWDER, LYOPHILIZED, FOR SOLUTION INTRAVENOUS at 22:17

## 2020-01-16 RX ADMIN — PIPERACILLIN AND TAZOBACTAM 25 GRAM(S): 4; .5 INJECTION, POWDER, LYOPHILIZED, FOR SOLUTION INTRAVENOUS at 06:29

## 2020-01-16 RX ADMIN — Medication 1 SPRAY(S): at 06:28

## 2020-01-16 RX ADMIN — Medication 2: at 08:02

## 2020-01-16 RX ADMIN — Medication 3 MILLILITER(S): at 16:50

## 2020-01-16 RX ADMIN — BUDESONIDE AND FORMOTEROL FUMARATE DIHYDRATE 2 PUFF(S): 160; 4.5 AEROSOL RESPIRATORY (INHALATION) at 11:04

## 2020-01-16 RX ADMIN — Medication 1: at 22:16

## 2020-01-16 RX ADMIN — PIPERACILLIN AND TAZOBACTAM 25 GRAM(S): 4; .5 INJECTION, POWDER, LYOPHILIZED, FOR SOLUTION INTRAVENOUS at 13:48

## 2020-01-16 RX ADMIN — Medication 3 MILLILITER(S): at 04:15

## 2020-01-16 RX ADMIN — AMLODIPINE BESYLATE 5 MILLIGRAM(S): 2.5 TABLET ORAL at 06:28

## 2020-01-16 RX ADMIN — INSULIN GLARGINE 14 UNIT(S): 100 INJECTION, SOLUTION SUBCUTANEOUS at 22:17

## 2020-01-16 RX ADMIN — Medication 100 MILLIGRAM(S): at 06:28

## 2020-01-16 RX ADMIN — Medication 4 UNIT(S): at 12:07

## 2020-01-16 RX ADMIN — Medication 3 MILLILITER(S): at 22:12

## 2020-01-16 RX ADMIN — Medication 325 MILLIGRAM(S): at 11:01

## 2020-01-16 NOTE — CHART NOTE - NSCHARTNOTEFT_GEN_A_CORE
NUTRITION SERVICES     Upon Nutritional Assessment by the Registered Dietitian your patient was determined to meet criteria/ has evidence of the following diagnosis/diagnoses:  [ ] Mild Protein Calorie Malnutrition   [ ] Moderate Protein Calorie Malnutrition   [x ] Severe Protein Calorie Malnutrition     Findings as based on:  •  Comprehensive nutritional assessment and consultation    Etiology: in the context of acute on chronic illness.     Signs/Symptoms: Moderate subcutaneous fat store depletion; Moderate muscle mass wasting.     Please refer to Initial Dietitian Evaluation via documents section of [facility] EMR for further recommendations.

## 2020-01-16 NOTE — DIETITIAN INITIAL EVALUATION ADULT. - PHYSICAL APPEARANCE
other (specify)/underweight Nutrition focused physical exam conducted - found signs of malnutrition present:  Subcutaneous fat loss: [moderate ]Triceps region  Muscle wasting: [moderate ]Temples region, [ moderate]Clavicle region, [ moderate]Shoulder region

## 2020-01-16 NOTE — PROGRESS NOTE ADULT - SUBJECTIVE AND OBJECTIVE BOX
CHIEF COMPLAINT:    Interval Events:    REVIEW OF SYSTEMS:  Constitutional:   Eyes:  ENT:  CV:  Resp:  GI:  :  MSK:  Integumentary:  Neurological:  Psychiatric:  Endocrine:  Hematologic/Lymphatic:  Allergic/Immunologic:  [ ] All other systems negative  [ ] Unable to assess ROS because ________    OBJECTIVE:  ICU Vital Signs Last 24 Hrs  T(C): 36.6 (16 Jan 2020 06:26), Max: 37.1 (15 Antione 2020 13:49)  T(F): 97.9 (16 Jan 2020 06:26), Max: 98.7 (15 Antione 2020 13:49)  HR: 97 (16 Jan 2020 06:26) (78 - 99)  BP: 128/70 (16 Jan 2020 06:26) (114/62 - 128/70)  BP(mean): --  ABP: --  ABP(mean): --  RR: 20 (16 Jan 2020 06:26) (18 - 20)  SpO2: 94% (16 Jan 2020 06:26) (94% - 100%)        CAPILLARY BLOOD GLUCOSE      POCT Blood Glucose.: 200 mg/dL (15 Antione 2020 21:17)      PHYSICAL EXAM:  General:   HEENT:   Lymph Nodes:  Neck:   Respiratory:   Cardiovascular:   Abdomen:   Extremities:   Skin:   Neurological:  Psychiatry:    HOSPITAL MEDICATIONS:  MEDICATIONS  (STANDING):  albuterol/ipratropium for Nebulization 3 milliLiter(s) Nebulizer every 6 hours  amLODIPine   Tablet 5 milliGRAM(s) Oral daily  budesonide 160 MICROgram(s)/formoterol 4.5 MICROgram(s) Inhaler 2 Puff(s) Inhalation two times a day  dextrose 5%. 1000 milliLiter(s) (50 mL/Hr) IV Continuous <Continuous>  dextrose 50% Injectable 12.5 Gram(s) IV Push once  dextrose 50% Injectable 25 Gram(s) IV Push once  dextrose 50% Injectable 25 Gram(s) IV Push once  ferrous    sulfate 325 milliGRAM(s) Oral daily  fluticasone propionate 50 MICROgram(s)/spray Nasal Spray 1 Spray(s) Both Nostrils two times a day  insulin glargine Injectable (LANTUS) 14 Unit(s) SubCutaneous at bedtime  insulin lispro (HumaLOG) corrective regimen sliding scale   SubCutaneous three times a day before meals  insulin lispro (HumaLOG) corrective regimen sliding scale   SubCutaneous at bedtime  insulin lispro Injectable (HumaLOG) 4 Unit(s) SubCutaneous three times a day before meals  montelukast 10 milliGRAM(s) Oral daily  piperacillin/tazobactam IVPB.. 3.375 Gram(s) IV Intermittent every 8 hours  predniSONE   Tablet 40 milliGRAM(s) Oral daily    MEDICATIONS  (PRN):  dextrose 40% Gel 15 Gram(s) Oral once PRN Blood Glucose LESS THAN 70 milliGRAM(s)/deciliter  glucagon  Injectable 1 milliGRAM(s) IntraMuscular once PRN Glucose LESS THAN 70 milligrams/deciliter  guaiFENesin   Syrup  (Sugar-Free) 100 milliGRAM(s) Oral every 6 hours PRN Cough  simethicone 80 milliGRAM(s) Chew three times a day PRN Gas      LABS:                        12.3   11.54 )-----------( 105      ( 15 Antione 2020 05:45 )             38.3     01-15    136  |  103  |  23  ----------------------------<  231<H>  4.0   |  22  |  0.52    Ca    8.8      15 Antione 2020 05:45  Phos  2.8     01-15  Mg     2.1     01-15    TPro  6.9  /  Alb  3.6  /  TBili  0.7  /  DBili  x   /  AST  76<H>  /  ALT  78<H>  /  AlkPhos  191<H>  01-14    PT/INR - ( 14 Jan 2020 15:05 )   PT: 12.9 SEC;   INR: 1.16          PTT - ( 14 Jan 2020 15:05 )  PTT:28.7 SEC      Venous Blood Gas:  01-14 @ 16:45  7.40/39/69/24/94.1  VBG Lactate: 1.8      MICROBIOLOGY:     RADIOLOGY:  [ ] Reviewed and interpreted by me    PULMONARY FUNCTION TESTS:    EKG: CHIEF COMPLAINT: Patient is a 64 year old Female who presents with a chief complaint of Exacerbation of Bronchiectasis.      Interval Events: No overnight events    REVIEW OF SYSTEMS:  Constitutional: denies fevers  CV: denies chest pain and palpitations  Resp: c/o cough  [ X] All other systems negative      OBJECTIVE:  ICU Vital Signs Last 24 Hrs  T(C): 36.6 (16 Jan 2020 06:26), Max: 37.1 (15 Antione 2020 13:49)  T(F): 97.9 (16 Jan 2020 06:26), Max: 98.7 (15 Antione 2020 13:49)  HR: 97 (16 Jan 2020 06:26) (78 - 99)  BP: 128/70 (16 Jan 2020 06:26) (114/62 - 128/70)  RR: 20 (16 Jan 2020 06:26) (18 - 20)  SpO2: 94% (16 Jan 2020 06:26) (94% - 100%)        CAPILLARY BLOOD GLUCOSE      POCT Blood Glucose.: 200 mg/dL (15 Antione 2020 21:17)    HOSPITAL MEDICATIONS:  MEDICATIONS  (STANDING):  albuterol/ipratropium for Nebulization 3 milliLiter(s) Nebulizer every 6 hours  amLODIPine   Tablet 5 milliGRAM(s) Oral daily  budesonide 160 MICROgram(s)/formoterol 4.5 MICROgram(s) Inhaler 2 Puff(s) Inhalation two times a day  dextrose 5%. 1000 milliLiter(s) (50 mL/Hr) IV Continuous <Continuous>  dextrose 50% Injectable 12.5 Gram(s) IV Push once  dextrose 50% Injectable 25 Gram(s) IV Push once  dextrose 50% Injectable 25 Gram(s) IV Push once  ferrous    sulfate 325 milliGRAM(s) Oral daily  fluticasone propionate 50 MICROgram(s)/spray Nasal Spray 1 Spray(s) Both Nostrils two times a day  insulin glargine Injectable (LANTUS) 14 Unit(s) SubCutaneous at bedtime  insulin lispro (HumaLOG) corrective regimen sliding scale   SubCutaneous three times a day before meals  insulin lispro (HumaLOG) corrective regimen sliding scale   SubCutaneous at bedtime  insulin lispro Injectable (HumaLOG) 4 Unit(s) SubCutaneous three times a day before meals  montelukast 10 milliGRAM(s) Oral daily  piperacillin/tazobactam IVPB.. 3.375 Gram(s) IV Intermittent every 8 hours  predniSONE   Tablet 40 milliGRAM(s) Oral daily    MEDICATIONS  (PRN):  dextrose 40% Gel 15 Gram(s) Oral once PRN Blood Glucose LESS THAN 70 milliGRAM(s)/deciliter  glucagon  Injectable 1 milliGRAM(s) IntraMuscular once PRN Glucose LESS THAN 70 milligrams/deciliter  guaiFENesin   Syrup  (Sugar-Free) 100 milliGRAM(s) Oral every 6 hours PRN Cough  simethicone 80 milliGRAM(s) Chew three times a day PRN Gas

## 2020-01-16 NOTE — PROGRESS NOTE ADULT - ASSESSMENT
Ms. Sky is a 63 YO F with PMHx of diffuse cystitic bronchiectasis (pseduomonas and stenotrophomonas on previous cultures, intermittently uses home O2) intermittently on Prednisone, Primary Ciliary Dyskinesia, ABPA, HTN, DM2 and Anxiety/ Depression who presented with fever and shortness of breath x 1 week in the setting of progressively worsening cough productive of green sputum. Patient admitted for exacerbation of bronchiectasis.

## 2020-01-16 NOTE — DIETITIAN INITIAL EVALUATION ADULT. - ADD RECOMMEND
1). Suggest Glucerna Therapeutic Nutrition Shake 240mls 3x daily (660kcals, 30g protein). 2). Encourage and monitor oral intake, especially of suggested nutrition supplement. 3). Monitor weights, labs, BM's, skin integrity and edema. 4). Follow pt as per protocol.

## 2020-01-16 NOTE — DIETITIAN INITIAL EVALUATION ADULT. - OTHER INFO
63 y/o female admitted with dx of bronchiectasis exacerbation. Visited with pt for nutrition hx. Pt said that she has had diminished oral intake since 3 weeks PTA due to persistent coughing. Her intake has improved a little, she is now eating approx half of her meals. She denies food allergies, nausea/vomiting/diarrhea/constipation, or issues with chewing/swallowing and tries to follow a diabetic diet at home. HbA1C 8.9 with POCT/Glu labs elevated - possibly exaggerated by steroid medication. Given pt's suboptimal oral intake PTA, recommend Glucerna Therapeutic Nutrition Shake 240mls 3x daily (660kcals, 30g protein) to optimize pt's nutrition intake. Pt is very thin and exhibits subcutaneous fat store depletion and muscle mass wasting placing her at severe risk for malnutrition.

## 2020-01-16 NOTE — DIETITIAN INITIAL EVALUATION ADULT. - PERTINENT MEDS FT
MEDICATIONS  (STANDING):  albuterol/ipratropium for Nebulization 3 milliLiter(s) Nebulizer every 6 hours  amLODIPine   Tablet 5 milliGRAM(s) Oral daily  budesonide 160 MICROgram(s)/formoterol 4.5 MICROgram(s) Inhaler 2 Puff(s) Inhalation two times a day  dextrose 5%. 1000 milliLiter(s) (50 mL/Hr) IV Continuous <Continuous>  dextrose 50% Injectable 12.5 Gram(s) IV Push once  dextrose 50% Injectable 25 Gram(s) IV Push once  dextrose 50% Injectable 25 Gram(s) IV Push once  enoxaparin Injectable 40 milliGRAM(s) SubCutaneous daily  ferrous    sulfate 325 milliGRAM(s) Oral daily  fluticasone propionate 50 MICROgram(s)/spray Nasal Spray 1 Spray(s) Both Nostrils two times a day  insulin glargine Injectable (LANTUS) 14 Unit(s) SubCutaneous at bedtime  insulin lispro (HumaLOG) corrective regimen sliding scale   SubCutaneous three times a day before meals  insulin lispro (HumaLOG) corrective regimen sliding scale   SubCutaneous at bedtime  insulin lispro Injectable (HumaLOG) 4 Unit(s) SubCutaneous three times a day before meals  montelukast 10 milliGRAM(s) Oral daily  piperacillin/tazobactam IVPB.. 3.375 Gram(s) IV Intermittent every 8 hours  predniSONE   Tablet 40 milliGRAM(s) Oral daily

## 2020-01-16 NOTE — PROGRESS NOTE ADULT - PROBLEM SELECTOR PLAN 1
# Acute on Chronic Bronchiectasis Exacerbation 2/2 BL Upper Lobe PNA   - CXR with BL upper lung field opacitiy (increased from prior) likely second to bronchiectasis howvever superimposed PNA cannot be ruled out.   - PCOUS with A LINES anteriorly and BLINES laterally.   - RVP with negative   - SCx with GPC   - BCx sent and pending   - Nebs standing Q6H. Sterodis with Prednisone 40mg PO QD (1/14 - TBD) continued.   - Home Singulair and Symbicort continued.   - Zosyn continued for now pending SCx speciation.

## 2020-01-16 NOTE — DIETITIAN INITIAL EVALUATION ADULT. - PERTINENT LABORATORY DATA
01-15 Na 136 mmol/L Glu 231 mg/dL<H> K+ 4.0 mmol/L Cr 0.52 mg/dL BUN 23 mg/dL Phos 2.8 mg/dL  01-15-20 HbA1c 8.9 %  01-16 @ 11:57 POCT 218 mg/dL  01-16 @ 08:00 POCT 159 mg/dL  01-15 @ 21:17 POCT 200 mg/dL  01-15 @ 17:14 POCT 305 mg/dL

## 2020-01-17 LAB
-  AMIKACIN: SIGNIFICANT CHANGE UP
-  AZTREONAM: SIGNIFICANT CHANGE UP
-  CEFEPIME: SIGNIFICANT CHANGE UP
-  CEFTAZIDIME: SIGNIFICANT CHANGE UP
-  GENTAMICIN: SIGNIFICANT CHANGE UP
-  IMIPENEM: SIGNIFICANT CHANGE UP
-  LEVOFLOXACIN: SIGNIFICANT CHANGE UP
-  MEROPENEM: SIGNIFICANT CHANGE UP
-  PIPERACILLIN/TAZOBACTAM: SIGNIFICANT CHANGE UP
-  TOBRAMYCIN: SIGNIFICANT CHANGE UP
ALBUMIN SERPL ELPH-MCNC: 3 G/DL — LOW (ref 3.3–5)
ALP SERPL-CCNC: 140 U/L — HIGH (ref 40–120)
ALT FLD-CCNC: 52 U/L — HIGH (ref 4–33)
ANION GAP SERPL CALC-SCNC: 11 MMO/L — SIGNIFICANT CHANGE UP (ref 7–14)
APTT BLD: 25.9 SEC — LOW (ref 27.5–36.3)
AST SERPL-CCNC: 19 U/L — SIGNIFICANT CHANGE UP (ref 4–32)
BACTERIA SPT RESP CULT: SIGNIFICANT CHANGE UP
BILIRUB SERPL-MCNC: 0.2 MG/DL — SIGNIFICANT CHANGE UP (ref 0.2–1.2)
BUN SERPL-MCNC: 16 MG/DL — SIGNIFICANT CHANGE UP (ref 7–23)
CALCIUM SERPL-MCNC: 9.2 MG/DL — SIGNIFICANT CHANGE UP (ref 8.4–10.5)
CHLORIDE SERPL-SCNC: 103 MMOL/L — SIGNIFICANT CHANGE UP (ref 98–107)
CO2 SERPL-SCNC: 25 MMOL/L — SIGNIFICANT CHANGE UP (ref 22–31)
CREAT SERPL-MCNC: 0.49 MG/DL — LOW (ref 0.5–1.3)
GLUCOSE BLDC GLUCOMTR-MCNC: 187 MG/DL — HIGH (ref 70–99)
GLUCOSE BLDC GLUCOMTR-MCNC: 207 MG/DL — HIGH (ref 70–99)
GLUCOSE BLDC GLUCOMTR-MCNC: 266 MG/DL — HIGH (ref 70–99)
GLUCOSE BLDC GLUCOMTR-MCNC: 305 MG/DL — HIGH (ref 70–99)
GLUCOSE BLDC GLUCOMTR-MCNC: 361 MG/DL — HIGH (ref 70–99)
GLUCOSE SERPL-MCNC: 197 MG/DL — HIGH (ref 70–99)
GRAM STN SPT: SIGNIFICANT CHANGE UP
INR BLD: 0.97 — SIGNIFICANT CHANGE UP (ref 0.88–1.17)
MAGNESIUM SERPL-MCNC: 1.8 MG/DL — SIGNIFICANT CHANGE UP (ref 1.6–2.6)
METHOD TYPE: SIGNIFICANT CHANGE UP
ORGANISM # SPEC MICROSCOPIC CNT: SIGNIFICANT CHANGE UP
ORGANISM # SPEC MICROSCOPIC CNT: SIGNIFICANT CHANGE UP
PHOSPHATE SERPL-MCNC: 2.9 MG/DL — SIGNIFICANT CHANGE UP (ref 2.5–4.5)
POTASSIUM SERPL-MCNC: 3.7 MMOL/L — SIGNIFICANT CHANGE UP (ref 3.5–5.3)
POTASSIUM SERPL-SCNC: 3.7 MMOL/L — SIGNIFICANT CHANGE UP (ref 3.5–5.3)
PROT SERPL-MCNC: 6.1 G/DL — SIGNIFICANT CHANGE UP (ref 6–8.3)
PROTHROM AB SERPL-ACNC: 10.7 SEC — SIGNIFICANT CHANGE UP (ref 9.8–13.1)
SODIUM SERPL-SCNC: 139 MMOL/L — SIGNIFICANT CHANGE UP (ref 135–145)

## 2020-01-17 PROCEDURE — 76705 ECHO EXAM OF ABDOMEN: CPT | Mod: 26

## 2020-01-17 PROCEDURE — 99233 SBSQ HOSP IP/OBS HIGH 50: CPT | Mod: GC

## 2020-01-17 RX ORDER — INSULIN LISPRO 100/ML
7 VIAL (ML) SUBCUTANEOUS
Refills: 0 | Status: DISCONTINUED | OUTPATIENT
Start: 2020-01-17 | End: 2020-01-20

## 2020-01-17 RX ORDER — SERTRALINE 25 MG/1
25 TABLET, FILM COATED ORAL DAILY
Refills: 0 | Status: DISCONTINUED | OUTPATIENT
Start: 2020-01-17 | End: 2020-01-20

## 2020-01-17 RX ORDER — INSULIN GLARGINE 100 [IU]/ML
17 INJECTION, SOLUTION SUBCUTANEOUS AT BEDTIME
Refills: 0 | Status: DISCONTINUED | OUTPATIENT
Start: 2020-01-17 | End: 2020-01-20

## 2020-01-17 RX ORDER — INSULIN LISPRO 100/ML
VIAL (ML) SUBCUTANEOUS AT BEDTIME
Refills: 0 | Status: DISCONTINUED | OUTPATIENT
Start: 2020-01-17 | End: 2020-01-20

## 2020-01-17 RX ORDER — ATORVASTATIN CALCIUM 80 MG/1
40 TABLET, FILM COATED ORAL AT BEDTIME
Refills: 0 | Status: DISCONTINUED | OUTPATIENT
Start: 2020-01-17 | End: 2020-01-20

## 2020-01-17 RX ORDER — CIPROFLOXACIN LACTATE 400MG/40ML
500 VIAL (ML) INTRAVENOUS EVERY 12 HOURS
Refills: 0 | Status: DISCONTINUED | OUTPATIENT
Start: 2020-01-17 | End: 2020-01-20

## 2020-01-17 RX ORDER — INSULIN LISPRO 100/ML
VIAL (ML) SUBCUTANEOUS
Refills: 0 | Status: DISCONTINUED | OUTPATIENT
Start: 2020-01-17 | End: 2020-01-20

## 2020-01-17 RX ADMIN — Medication 1 SPRAY(S): at 17:19

## 2020-01-17 RX ADMIN — CHLORHEXIDINE GLUCONATE 1 APPLICATION(S): 213 SOLUTION TOPICAL at 12:42

## 2020-01-17 RX ADMIN — Medication 3 MILLILITER(S): at 15:11

## 2020-01-17 RX ADMIN — Medication 3 MILLILITER(S): at 09:14

## 2020-01-17 RX ADMIN — ATORVASTATIN CALCIUM 40 MILLIGRAM(S): 80 TABLET, FILM COATED ORAL at 22:03

## 2020-01-17 RX ADMIN — ENOXAPARIN SODIUM 40 MILLIGRAM(S): 100 INJECTION SUBCUTANEOUS at 12:41

## 2020-01-17 RX ADMIN — Medication 2: at 07:51

## 2020-01-17 RX ADMIN — Medication 7 UNIT(S): at 22:00

## 2020-01-17 RX ADMIN — INSULIN GLARGINE 17 UNIT(S): 100 INJECTION, SOLUTION SUBCUTANEOUS at 22:00

## 2020-01-17 RX ADMIN — Medication 6: at 12:41

## 2020-01-17 RX ADMIN — Medication 4 UNIT(S): at 12:41

## 2020-01-17 RX ADMIN — PIPERACILLIN AND TAZOBACTAM 25 GRAM(S): 4; .5 INJECTION, POWDER, LYOPHILIZED, FOR SOLUTION INTRAVENOUS at 06:17

## 2020-01-17 RX ADMIN — Medication 4: at 17:17

## 2020-01-17 RX ADMIN — Medication 500 MILLIGRAM(S): at 12:46

## 2020-01-17 RX ADMIN — Medication 500 MILLIGRAM(S): at 17:19

## 2020-01-17 RX ADMIN — Medication 325 MILLIGRAM(S): at 12:42

## 2020-01-17 RX ADMIN — Medication 3 MILLILITER(S): at 21:05

## 2020-01-17 RX ADMIN — MONTELUKAST 10 MILLIGRAM(S): 4 TABLET, CHEWABLE ORAL at 12:42

## 2020-01-17 RX ADMIN — Medication 3 MILLIGRAM(S): at 21:59

## 2020-01-17 RX ADMIN — Medication 40 MILLIGRAM(S): at 06:17

## 2020-01-17 RX ADMIN — Medication 7 UNIT(S): at 17:18

## 2020-01-17 RX ADMIN — BUDESONIDE AND FORMOTEROL FUMARATE DIHYDRATE 2 PUFF(S): 160; 4.5 AEROSOL RESPIRATORY (INHALATION) at 09:10

## 2020-01-17 RX ADMIN — BUDESONIDE AND FORMOTEROL FUMARATE DIHYDRATE 2 PUFF(S): 160; 4.5 AEROSOL RESPIRATORY (INHALATION) at 21:07

## 2020-01-17 RX ADMIN — Medication 1 SPRAY(S): at 06:17

## 2020-01-17 RX ADMIN — Medication 4 UNIT(S): at 07:51

## 2020-01-17 RX ADMIN — AMLODIPINE BESYLATE 5 MILLIGRAM(S): 2.5 TABLET ORAL at 06:17

## 2020-01-17 RX ADMIN — Medication 100 MILLIGRAM(S): at 22:01

## 2020-01-17 NOTE — PROGRESS NOTE ADULT - ASSESSMENT
Ms. Sky is a 65 YO F with PMHx of diffuse cystitic bronchiectasis (pseduomonas and stenotrophomonas on previous cultures, intermittently uses home O2) intermittently on Prednisone, Primary Ciliary Dyskinesia, ABPA, HTN, DM2 and Anxiety/ Depression who presented with fever and shortness of breath x 1 week in the setting of progressively worsening cough productive of green sputum. Patient admitted for exacerbation of bronchiectasis.

## 2020-01-17 NOTE — PROGRESS NOTE ADULT - SUBJECTIVE AND OBJECTIVE BOX
CHIEF COMPLAINT:    Interval Events:    REVIEW OF SYSTEMS:  Constitutional:   Eyes:  ENT:  CV:  Resp:  GI:  :  MSK:  Integumentary:  Neurological:  Psychiatric:  Endocrine:  Hematologic/Lymphatic:  Allergic/Immunologic:  [ ] All other systems negative  [ ] Unable to assess ROS because ________    OBJECTIVE:  ICU Vital Signs Last 24 Hrs  T(C): 36.7 (17 Jan 2020 06:16), Max: 36.7 (16 Jan 2020 22:12)  T(F): 98 (17 Jan 2020 06:16), Max: 98.1 (16 Jan 2020 22:12)  HR: 80 (17 Jan 2020 06:16) (72 - 96)  BP: 137/74 (17 Jan 2020 06:16) (119/69 - 137/74)  BP(mean): --  ABP: --  ABP(mean): --  RR: 18 (17 Jan 2020 06:16) (18 - 18)  SpO2: 100% (17 Jan 2020 06:16) (97% - 100%)        CAPILLARY BLOOD GLUCOSE      POCT Blood Glucose.: 262 mg/dL (16 Jan 2020 22:12)      PHYSICAL EXAM:  General:   HEENT:   Lymph Nodes:  Neck:   Respiratory:   Cardiovascular:   Abdomen:   Extremities:   Skin:   Neurological:  Psychiatry:    HOSPITAL MEDICATIONS:  MEDICATIONS  (STANDING):  albuterol/ipratropium for Nebulization 3 milliLiter(s) Nebulizer every 6 hours  amLODIPine   Tablet 5 milliGRAM(s) Oral daily  budesonide 160 MICROgram(s)/formoterol 4.5 MICROgram(s) Inhaler 2 Puff(s) Inhalation two times a day  chlorhexidine 4% Liquid 1 Application(s) Topical daily  dextrose 5%. 1000 milliLiter(s) (50 mL/Hr) IV Continuous <Continuous>  dextrose 50% Injectable 12.5 Gram(s) IV Push once  dextrose 50% Injectable 25 Gram(s) IV Push once  dextrose 50% Injectable 25 Gram(s) IV Push once  enoxaparin Injectable 40 milliGRAM(s) SubCutaneous daily  ferrous    sulfate 325 milliGRAM(s) Oral daily  fluticasone propionate 50 MICROgram(s)/spray Nasal Spray 1 Spray(s) Both Nostrils two times a day  insulin glargine Injectable (LANTUS) 14 Unit(s) SubCutaneous at bedtime  insulin lispro (HumaLOG) corrective regimen sliding scale   SubCutaneous three times a day before meals  insulin lispro (HumaLOG) corrective regimen sliding scale   SubCutaneous at bedtime  insulin lispro Injectable (HumaLOG) 4 Unit(s) SubCutaneous three times a day before meals  melatonin 3 milliGRAM(s) Oral at bedtime  montelukast 10 milliGRAM(s) Oral daily  piperacillin/tazobactam IVPB.. 3.375 Gram(s) IV Intermittent every 8 hours  predniSONE   Tablet 40 milliGRAM(s) Oral daily    MEDICATIONS  (PRN):  dextrose 40% Gel 15 Gram(s) Oral once PRN Blood Glucose LESS THAN 70 milliGRAM(s)/deciliter  glucagon  Injectable 1 milliGRAM(s) IntraMuscular once PRN Glucose LESS THAN 70 milligrams/deciliter  guaiFENesin   Syrup  (Sugar-Free) 100 milliGRAM(s) Oral every 6 hours PRN Cough  simethicone 80 milliGRAM(s) Chew three times a day PRN Gas      LABS:                    MICROBIOLOGY:     RADIOLOGY:  [ ] Reviewed and interpreted by me    PULMONARY FUNCTION TESTS:    EKG: CHIEF COMPLAINT: Patient is a 64 year old Female who presents with a chief complaint of Exacerbation of Bronchiectasis.      Interval Events: No overnight events    REVIEW OF SYSTEMS:  Constitutional: denies  fevers and malaise  CV: denies chest pain and palpitations  Resp: c/o [place], cough  [X ] All other systems negative      OBJECTIVE:  ICU Vital Signs Last 24 Hrs  T(C): 36.7 (17 Jan 2020 06:16), Max: 36.7 (16 Jan 2020 22:12)  T(F): 98 (17 Jan 2020 06:16), Max: 98.1 (16 Jan 2020 22:12)  HR: 80 (17 Jan 2020 06:16) (72 - 96)  BP: 137/74 (17 Jan 2020 06:16) (119/69 - 137/74)  RR: 18 (17 Jan 2020 06:16) (18 - 18)  SpO2: 100% (17 Jan 2020 06:16) (97% - 100%)        CAPILLARY BLOOD GLUCOSE    POCT Blood Glucose.: 262 mg/dL (16 Jan 2020 22:12)    HOSPITAL MEDICATIONS:  MEDICATIONS  (STANDING):  albuterol/ipratropium for Nebulization 3 milliLiter(s) Nebulizer every 6 hours  amLODIPine   Tablet 5 milliGRAM(s) Oral daily  budesonide 160 MICROgram(s)/formoterol 4.5 MICROgram(s) Inhaler 2 Puff(s) Inhalation two times a day  chlorhexidine 4% Liquid 1 Application(s) Topical daily  dextrose 5%. 1000 milliLiter(s) (50 mL/Hr) IV Continuous <Continuous>  dextrose 50% Injectable 12.5 Gram(s) IV Push once  dextrose 50% Injectable 25 Gram(s) IV Push once  dextrose 50% Injectable 25 Gram(s) IV Push once  enoxaparin Injectable 40 milliGRAM(s) SubCutaneous daily  ferrous    sulfate 325 milliGRAM(s) Oral daily  fluticasone propionate 50 MICROgram(s)/spray Nasal Spray 1 Spray(s) Both Nostrils two times a day  insulin glargine Injectable (LANTUS) 14 Unit(s) SubCutaneous at bedtime  insulin lispro (HumaLOG) corrective regimen sliding scale   SubCutaneous three times a day before meals  insulin lispro (HumaLOG) corrective regimen sliding scale   SubCutaneous at bedtime  insulin lispro Injectable (HumaLOG) 4 Unit(s) SubCutaneous three times a day before meals  melatonin 3 milliGRAM(s) Oral at bedtime  montelukast 10 milliGRAM(s) Oral daily  piperacillin/tazobactam IVPB.. 3.375 Gram(s) IV Intermittent every 8 hours  predniSONE   Tablet 40 milliGRAM(s) Oral daily    MEDICATIONS  (PRN):  dextrose 40% Gel 15 Gram(s) Oral once PRN Blood Glucose LESS THAN 70 milliGRAM(s)/deciliter  glucagon  Injectable 1 milliGRAM(s) IntraMuscular once PRN Glucose LESS THAN 70 milligrams/deciliter  guaiFENesin   Syrup  (Sugar-Free) 100 milliGRAM(s) Oral every 6 hours PRN Cough  simethicone 80 milliGRAM(s) Chew three times a day PRN Gas      LABS:  01-17    139  |  103  |  16  ----------------------------<  197<H>  3.7   |  25  |  0.49<L>    Ca    9.2      17 Jan 2020 06:50  Phos  2.9     01-17  Mg     1.8     01-17    TPro  6.1  /  Alb  3.0<L>  /  TBili  0.2  /  DBili  x   /  AST  19  /  ALT  52<H>  /  AlkPhos  140<H>  01-17    PT/INR - ( 17 Jan 2020 06:50 )   PT: 10.7 SEC;   INR: 0.97          PTT - ( 17 Jan 2020 06:50 )  PTT:25.9 SEC        RADIOLOGY:    < from: US Abdomen Limited (01.17.20 @ 12:18) >  FINDINGS:    Liver: Coarsened and nodular, consistent with underlying cirrhosis. No sonographically identifiable liver lesions.    IMPRESSION:     Cirrhotic liver.    Cholecystectomy.    Pneumobilia, which may related to incompetence of the ampullary sphincter or prior sphincterotomy.    < end of copied text >

## 2020-01-17 NOTE — PROGRESS NOTE ADULT - PROBLEM SELECTOR PLAN 1
# Acute on Chronic Bronchiectasis Exacerbation 2/2 BL Upper Lobe PNA   - CXR with BL upper lung field opacitiy (increased from prior) likely second to bronchiectasis howvever superimposed PNA cannot be ruled out.   - PCOUS with A LINES anteriorly and BLINES laterally.   - RVP with negative   - SCx with GPC   - BCx negative x48hrs  - Nebs standing Q6H.  Prednisone 40mg PO QD, Will taper off.  - Home Singulair and Symbicort continued.   - Zosyn switched to PO Cipro # Acute on Chronic Bronchiectasis Exacerbation 2/2 BL Upper Lobe PNA   - CXR with BL upper lung field opacity (increased from prior) likely second to bronchiectasis however superimposed PNA cannot be ruled out.   - POCUS with A LINES anteriorly and B-LINES laterally.   - RVP with negative   - SCx with GPC Pseudomonas  - BCx negative x48hrs  - Nebs standing Q6H.  Prednisone 40mg PO QD, Will taper off.  - Home Singulair and Symbicort continued.   - Zosyn switched to PO Cipro

## 2020-01-17 NOTE — CHART NOTE - NSCHARTNOTEFT_GEN_A_CORE
Pt ambulated without O2 and SpO2 did not drop below 92%. Pt developed dizziness and unsteadiness on her feet during ambulation requiring her to sit down. Checked orthostatics which were negative and check fingerstick which was high. Pt's dizziness resolved when she sat down. Neurologic exam nonfocal. Spoke with pt and her daughter using speaker phone, and pt said she had been having similar events for months but did not tell anyone. She is now feeling much better.    Kam Gustafson MD  Fellow, Pulmonary and Critical Care Medicine  [facility]  Pager: [phone], [phone] ([facility])  Email: [email or web address]

## 2020-01-18 LAB
ANION GAP SERPL CALC-SCNC: 10 MMO/L — SIGNIFICANT CHANGE UP (ref 7–14)
BUN SERPL-MCNC: 17 MG/DL — SIGNIFICANT CHANGE UP (ref 7–23)
CALCIUM SERPL-MCNC: 9.2 MG/DL — SIGNIFICANT CHANGE UP (ref 8.4–10.5)
CHLORIDE SERPL-SCNC: 104 MMOL/L — SIGNIFICANT CHANGE UP (ref 98–107)
CHOLEST SERPL-MCNC: 168 MG/DL — SIGNIFICANT CHANGE UP (ref 120–199)
CO2 SERPL-SCNC: 26 MMOL/L — SIGNIFICANT CHANGE UP (ref 22–31)
CREAT SERPL-MCNC: 0.46 MG/DL — LOW (ref 0.5–1.3)
GLUCOSE BLDC GLUCOMTR-MCNC: 134 MG/DL — HIGH (ref 70–99)
GLUCOSE BLDC GLUCOMTR-MCNC: 189 MG/DL — HIGH (ref 70–99)
GLUCOSE BLDC GLUCOMTR-MCNC: 192 MG/DL — HIGH (ref 70–99)
GLUCOSE BLDC GLUCOMTR-MCNC: 232 MG/DL — HIGH (ref 70–99)
GLUCOSE SERPL-MCNC: 91 MG/DL — SIGNIFICANT CHANGE UP (ref 70–99)
HCT VFR BLD CALC: 38 % — SIGNIFICANT CHANGE UP (ref 34.5–45)
HCV RNA SERPL NAA DL=5-ACNC: NOT DETECTED IU/ML — SIGNIFICANT CHANGE UP
HCV RNA SPEC NAA+PROBE-LOG IU: SIGNIFICANT CHANGE UP
HDLC SERPL-MCNC: 70 MG/DL — HIGH (ref 45–65)
HGB BLD-MCNC: 12 G/DL — SIGNIFICANT CHANGE UP (ref 11.5–15.5)
LIPID PNL WITH DIRECT LDL SERPL: 87 MG/DL — SIGNIFICANT CHANGE UP
MAGNESIUM SERPL-MCNC: 1.8 MG/DL — SIGNIFICANT CHANGE UP (ref 1.6–2.6)
MCHC RBC-ENTMCNC: 27.1 PG — SIGNIFICANT CHANGE UP (ref 27–34)
MCHC RBC-ENTMCNC: 31.6 % — LOW (ref 32–36)
MCV RBC AUTO: 85.8 FL — SIGNIFICANT CHANGE UP (ref 80–100)
NRBC # FLD: 0 K/UL — SIGNIFICANT CHANGE UP (ref 0–0)
PHOSPHATE SERPL-MCNC: 2.8 MG/DL — SIGNIFICANT CHANGE UP (ref 2.5–4.5)
PLATELET # BLD AUTO: 142 K/UL — LOW (ref 150–400)
PMV BLD: 10.7 FL — SIGNIFICANT CHANGE UP (ref 7–13)
POTASSIUM SERPL-MCNC: 3.5 MMOL/L — SIGNIFICANT CHANGE UP (ref 3.5–5.3)
POTASSIUM SERPL-SCNC: 3.5 MMOL/L — SIGNIFICANT CHANGE UP (ref 3.5–5.3)
RBC # BLD: 4.43 M/UL — SIGNIFICANT CHANGE UP (ref 3.8–5.2)
RBC # FLD: 14.3 % — SIGNIFICANT CHANGE UP (ref 10.3–14.5)
SODIUM SERPL-SCNC: 140 MMOL/L — SIGNIFICANT CHANGE UP (ref 135–145)
TRIGL SERPL-MCNC: 52 MG/DL — SIGNIFICANT CHANGE UP (ref 10–149)
WBC # BLD: 7.82 K/UL — SIGNIFICANT CHANGE UP (ref 3.8–10.5)
WBC # FLD AUTO: 7.82 K/UL — SIGNIFICANT CHANGE UP (ref 3.8–10.5)

## 2020-01-18 PROCEDURE — 99233 SBSQ HOSP IP/OBS HIGH 50: CPT

## 2020-01-18 RX ADMIN — Medication 100 MILLIGRAM(S): at 22:49

## 2020-01-18 RX ADMIN — Medication 325 MILLIGRAM(S): at 12:11

## 2020-01-18 RX ADMIN — AMLODIPINE BESYLATE 5 MILLIGRAM(S): 2.5 TABLET ORAL at 06:20

## 2020-01-18 RX ADMIN — SIMETHICONE 80 MILLIGRAM(S): 80 TABLET, CHEWABLE ORAL at 22:49

## 2020-01-18 RX ADMIN — Medication 7 UNIT(S): at 22:50

## 2020-01-18 RX ADMIN — Medication 500 MILLIGRAM(S): at 06:20

## 2020-01-18 RX ADMIN — ATORVASTATIN CALCIUM 40 MILLIGRAM(S): 80 TABLET, FILM COATED ORAL at 22:49

## 2020-01-18 RX ADMIN — Medication 1 SPRAY(S): at 06:20

## 2020-01-18 RX ADMIN — Medication 1: at 12:11

## 2020-01-18 RX ADMIN — Medication 3 MILLILITER(S): at 09:35

## 2020-01-18 RX ADMIN — Medication 3 MILLIGRAM(S): at 22:49

## 2020-01-18 RX ADMIN — INSULIN GLARGINE 17 UNIT(S): 100 INJECTION, SOLUTION SUBCUTANEOUS at 22:49

## 2020-01-18 RX ADMIN — Medication 7 UNIT(S): at 17:12

## 2020-01-18 RX ADMIN — MONTELUKAST 10 MILLIGRAM(S): 4 TABLET, CHEWABLE ORAL at 12:11

## 2020-01-18 RX ADMIN — Medication 7 UNIT(S): at 08:52

## 2020-01-18 RX ADMIN — Medication 3 MILLILITER(S): at 15:54

## 2020-01-18 RX ADMIN — Medication 2: at 17:11

## 2020-01-18 RX ADMIN — Medication 7 UNIT(S): at 12:11

## 2020-01-18 RX ADMIN — ENOXAPARIN SODIUM 40 MILLIGRAM(S): 100 INJECTION SUBCUTANEOUS at 12:11

## 2020-01-18 RX ADMIN — BUDESONIDE AND FORMOTEROL FUMARATE DIHYDRATE 2 PUFF(S): 160; 4.5 AEROSOL RESPIRATORY (INHALATION) at 09:35

## 2020-01-18 RX ADMIN — Medication 40 MILLIGRAM(S): at 06:20

## 2020-01-18 RX ADMIN — BUDESONIDE AND FORMOTEROL FUMARATE DIHYDRATE 2 PUFF(S): 160; 4.5 AEROSOL RESPIRATORY (INHALATION) at 21:27

## 2020-01-18 RX ADMIN — Medication 1 SPRAY(S): at 17:12

## 2020-01-18 RX ADMIN — Medication 500 MILLIGRAM(S): at 17:11

## 2020-01-18 RX ADMIN — Medication 3 MILLILITER(S): at 21:27

## 2020-01-18 RX ADMIN — Medication 3 MILLILITER(S): at 03:54

## 2020-01-18 RX ADMIN — SERTRALINE 25 MILLIGRAM(S): 25 TABLET, FILM COATED ORAL at 12:14

## 2020-01-18 RX ADMIN — CHLORHEXIDINE GLUCONATE 1 APPLICATION(S): 213 SOLUTION TOPICAL at 12:12

## 2020-01-18 NOTE — PROGRESS NOTE ADULT - PROBLEM SELECTOR PLAN 1
# Acute on Chronic Bronchiectasis Exacerbation 2/2 BL Upper Lobe PNA   - CXR with BL upper lung field opacity (increased from prior) likely second to bronchiectasis however superimposed PNA cannot be ruled out.   - POCUS with A LINES anteriorly and B-LINES laterally.   - RVP with negative   - SCx with GPC Pseudomonas  - BCx negative x48hrs  - Nebs standing Q6H.  Prednisone 40mg PO QD, Will taper off.  - Home Singulair and Symbicort continued.   - Zosyn switched to PO Cipro

## 2020-01-18 NOTE — PROGRESS NOTE ADULT - SUBJECTIVE AND OBJECTIVE BOX
CHIEF COMPLAINT:    Interval Events:    REVIEW OF SYSTEMS:  Constitutional:   Eyes:  ENT:  CV:  Resp:  GI:  :  MSK:  Integumentary:  Neurological:  Psychiatric:  Endocrine:  Hematologic/Lymphatic:  Allergic/Immunologic:  [ ] All other systems negative  [ ] Unable to assess ROS because ________    OBJECTIVE:  ICU Vital Signs Last 24 Hrs  T(C): 36.7 (18 Jan 2020 06:19), Max: 36.9 (17 Jan 2020 14:00)  T(F): 98.1 (18 Jan 2020 06:19), Max: 98.4 (17 Jan 2020 14:00)  HR: 81 (18 Jan 2020 06:19) (76 - 100)  BP: 132/83 (18 Jan 2020 06:19) (127/72 - 137/76)  BP(mean): --  ABP: --  ABP(mean): --  RR: 18 (18 Jan 2020 06:19) (18 - 18)  SpO2: 96% (18 Jan 2020 06:19) (96% - 100%)        CAPILLARY BLOOD GLUCOSE      POCT Blood Glucose.: 207 mg/dL (17 Jan 2020 21:23)      HOSPITAL MEDICATIONS:  MEDICATIONS  (STANDING):  albuterol/ipratropium for Nebulization 3 milliLiter(s) Nebulizer every 6 hours  amLODIPine   Tablet 5 milliGRAM(s) Oral daily  atorvastatin 40 milliGRAM(s) Oral at bedtime  budesonide 160 MICROgram(s)/formoterol 4.5 MICROgram(s) Inhaler 2 Puff(s) Inhalation two times a day  chlorhexidine 4% Liquid 1 Application(s) Topical daily  ciprofloxacin     Tablet 500 milliGRAM(s) Oral every 12 hours  dextrose 5%. 1000 milliLiter(s) (50 mL/Hr) IV Continuous <Continuous>  dextrose 50% Injectable 12.5 Gram(s) IV Push once  dextrose 50% Injectable 25 Gram(s) IV Push once  dextrose 50% Injectable 25 Gram(s) IV Push once  enoxaparin Injectable 40 milliGRAM(s) SubCutaneous daily  ferrous    sulfate 325 milliGRAM(s) Oral daily  fluticasone propionate 50 MICROgram(s)/spray Nasal Spray 1 Spray(s) Both Nostrils two times a day  insulin glargine Injectable (LANTUS) 17 Unit(s) SubCutaneous at bedtime  insulin lispro (HumaLOG) corrective regimen sliding scale   SubCutaneous three times a day before meals  insulin lispro (HumaLOG) corrective regimen sliding scale   SubCutaneous at bedtime  insulin lispro Injectable (HumaLOG) 7 Unit(s) SubCutaneous Before meals and at bedtime  melatonin 3 milliGRAM(s) Oral at bedtime  montelukast 10 milliGRAM(s) Oral daily  sertraline 25 milliGRAM(s) Oral daily    MEDICATIONS  (PRN):  dextrose 40% Gel 15 Gram(s) Oral once PRN Blood Glucose LESS THAN 70 milliGRAM(s)/deciliter  glucagon  Injectable 1 milliGRAM(s) IntraMuscular once PRN Glucose LESS THAN 70 milligrams/deciliter  guaiFENesin   Syrup  (Sugar-Free) 100 milliGRAM(s) Oral every 6 hours PRN Cough  simethicone 80 milliGRAM(s) Chew three times a day PRN Gas      LABS:                        12.0   7.82  )-----------( 142      ( 18 Jan 2020 05:45 )             38.0     01-18    140  |  104  |  17  ----------------------------<  91  3.5   |  26  |  0.46<L>    Ca    9.2      18 Jan 2020 05:45  Phos  2.8     01-18  Mg     1.8     01-18    TPro  6.1  /  Alb  3.0<L>  /  TBili  0.2  /  DBili  x   /  AST  19  /  ALT  52<H>  /  AlkPhos  140<H>  01-17    PT/INR - ( 17 Jan 2020 06:50 )   PT: 10.7 SEC;   INR: 0.97          PTT - ( 17 Jan 2020 06:50 )  PTT:25.9 SEC          MICROBIOLOGY:     RADIOLOGY:  [ ] Reviewed and interpreted by me    PULMONARY FUNCTION TESTS:    EKG: CHIEF COMPLAINT: Patient is a 64y old  Female who presents with a chief complaint of Exacerbation of Bronchiectasis (18 Jan 2020 07:25)      Interval Events: none overnight     REVIEW OF SYSTEMS:  Constitutional: No fever /pain   CV: Denies   Resp: Denies   [ x] All other systems negative  [ ] Unable to assess ROS because ________    OBJECTIVE:  ICU Vital Signs Last 24 Hrs  T(C): 36.7 (18 Jan 2020 06:19), Max: 36.9 (17 Jan 2020 14:00)  T(F): 98.1 (18 Jan 2020 06:19), Max: 98.4 (17 Jan 2020 14:00)  HR: 81 (18 Jan 2020 06:19) (76 - 100)  BP: 132/83 (18 Jan 2020 06:19) (127/72 - 137/76)  BP(mean): --  ABP: --  ABP(mean): --  RR: 18 (18 Jan 2020 06:19) (18 - 18)  SpO2: 96% (18 Jan 2020 06:19) (96% - 100%)        CAPILLARY BLOOD GLUCOSE      POCT Blood Glucose.: 207 mg/dL (17 Jan 2020 21:23)      HOSPITAL MEDICATIONS:  MEDICATIONS  (STANDING):  albuterol/ipratropium for Nebulization 3 milliLiter(s) Nebulizer every 6 hours  amLODIPine   Tablet 5 milliGRAM(s) Oral daily  atorvastatin 40 milliGRAM(s) Oral at bedtime  budesonide 160 MICROgram(s)/formoterol 4.5 MICROgram(s) Inhaler 2 Puff(s) Inhalation two times a day  chlorhexidine 4% Liquid 1 Application(s) Topical daily  ciprofloxacin     Tablet 500 milliGRAM(s) Oral every 12 hours  dextrose 5%. 1000 milliLiter(s) (50 mL/Hr) IV Continuous <Continuous>  dextrose 50% Injectable 12.5 Gram(s) IV Push once  dextrose 50% Injectable 25 Gram(s) IV Push once  dextrose 50% Injectable 25 Gram(s) IV Push once  enoxaparin Injectable 40 milliGRAM(s) SubCutaneous daily  ferrous    sulfate 325 milliGRAM(s) Oral daily  fluticasone propionate 50 MICROgram(s)/spray Nasal Spray 1 Spray(s) Both Nostrils two times a day  insulin glargine Injectable (LANTUS) 17 Unit(s) SubCutaneous at bedtime  insulin lispro (HumaLOG) corrective regimen sliding scale   SubCutaneous three times a day before meals  insulin lispro (HumaLOG) corrective regimen sliding scale   SubCutaneous at bedtime  insulin lispro Injectable (HumaLOG) 7 Unit(s) SubCutaneous Before meals and at bedtime  melatonin 3 milliGRAM(s) Oral at bedtime  montelukast 10 milliGRAM(s) Oral daily  sertraline 25 milliGRAM(s) Oral daily    MEDICATIONS  (PRN):  dextrose 40% Gel 15 Gram(s) Oral once PRN Blood Glucose LESS THAN 70 milliGRAM(s)/deciliter  glucagon  Injectable 1 milliGRAM(s) IntraMuscular once PRN Glucose LESS THAN 70 milligrams/deciliter  guaiFENesin   Syrup  (Sugar-Free) 100 milliGRAM(s) Oral every 6 hours PRN Cough  simethicone 80 milliGRAM(s) Chew three times a day PRN Gas      LABS:                        12.0   7.82  )-----------( 142      ( 18 Jan 2020 05:45 )             38.0     01-18    140  |  104  |  17  ----------------------------<  91  3.5   |  26  |  0.46<L>    Ca    9.2      18 Jan 2020 05:45  Phos  2.8     01-18  Mg     1.8     01-18    TPro  6.1  /  Alb  3.0<L>  /  TBili  0.2  /  DBili  x   /  AST  19  /  ALT  52<H>  /  AlkPhos  140<H>  01-17    PT/INR - ( 17 Jan 2020 06:50 )   PT: 10.7 SEC;   INR: 0.97          PTT - ( 17 Jan 2020 06:50 )  PTT:25.9 SEC          MICROBIOLOGY:     RADIOLOGY:  [ ] Reviewed and interpreted by me    PULMONARY FUNCTION TESTS:    EKG:

## 2020-01-19 LAB
ANION GAP SERPL CALC-SCNC: 9 MMO/L — SIGNIFICANT CHANGE UP (ref 7–14)
BACTERIA BLD CULT: SIGNIFICANT CHANGE UP
BACTERIA BLD CULT: SIGNIFICANT CHANGE UP
BUN SERPL-MCNC: 16 MG/DL — SIGNIFICANT CHANGE UP (ref 7–23)
CALCIUM SERPL-MCNC: 9 MG/DL — SIGNIFICANT CHANGE UP (ref 8.4–10.5)
CHLORIDE SERPL-SCNC: 101 MMOL/L — SIGNIFICANT CHANGE UP (ref 98–107)
CO2 SERPL-SCNC: 27 MMOL/L — SIGNIFICANT CHANGE UP (ref 22–31)
CREAT SERPL-MCNC: 0.46 MG/DL — LOW (ref 0.5–1.3)
GLUCOSE BLDC GLUCOMTR-MCNC: 127 MG/DL — HIGH (ref 70–99)
GLUCOSE BLDC GLUCOMTR-MCNC: 207 MG/DL — HIGH (ref 70–99)
GLUCOSE BLDC GLUCOMTR-MCNC: 222 MG/DL — HIGH (ref 70–99)
GLUCOSE BLDC GLUCOMTR-MCNC: 222 MG/DL — HIGH (ref 70–99)
GLUCOSE SERPL-MCNC: 115 MG/DL — HIGH (ref 70–99)
HCT VFR BLD CALC: 38.1 % — SIGNIFICANT CHANGE UP (ref 34.5–45)
HGB BLD-MCNC: 12.1 G/DL — SIGNIFICANT CHANGE UP (ref 11.5–15.5)
MAGNESIUM SERPL-MCNC: 1.7 MG/DL — SIGNIFICANT CHANGE UP (ref 1.6–2.6)
MCHC RBC-ENTMCNC: 27.6 PG — SIGNIFICANT CHANGE UP (ref 27–34)
MCHC RBC-ENTMCNC: 31.8 % — LOW (ref 32–36)
MCV RBC AUTO: 87 FL — SIGNIFICANT CHANGE UP (ref 80–100)
NRBC # FLD: 0 K/UL — SIGNIFICANT CHANGE UP (ref 0–0)
PHOSPHATE SERPL-MCNC: 3.8 MG/DL — SIGNIFICANT CHANGE UP (ref 2.5–4.5)
PLATELET # BLD AUTO: 166 K/UL — SIGNIFICANT CHANGE UP (ref 150–400)
PMV BLD: 11 FL — SIGNIFICANT CHANGE UP (ref 7–13)
POTASSIUM SERPL-MCNC: 3.2 MMOL/L — LOW (ref 3.5–5.3)
POTASSIUM SERPL-SCNC: 3.2 MMOL/L — LOW (ref 3.5–5.3)
RBC # BLD: 4.38 M/UL — SIGNIFICANT CHANGE UP (ref 3.8–5.2)
RBC # FLD: 14.5 % — SIGNIFICANT CHANGE UP (ref 10.3–14.5)
SODIUM SERPL-SCNC: 137 MMOL/L — SIGNIFICANT CHANGE UP (ref 135–145)
WBC # BLD: 8.93 K/UL — SIGNIFICANT CHANGE UP (ref 3.8–10.5)
WBC # FLD AUTO: 8.93 K/UL — SIGNIFICANT CHANGE UP (ref 3.8–10.5)

## 2020-01-19 PROCEDURE — 99233 SBSQ HOSP IP/OBS HIGH 50: CPT | Mod: GC

## 2020-01-19 RX ORDER — CALCIUM CARBONATE 500(1250)
2 TABLET ORAL ONCE
Refills: 0 | Status: COMPLETED | OUTPATIENT
Start: 2020-01-19 | End: 2020-01-19

## 2020-01-19 RX ORDER — POTASSIUM CHLORIDE 20 MEQ
40 PACKET (EA) ORAL EVERY 4 HOURS
Refills: 0 | Status: COMPLETED | OUTPATIENT
Start: 2020-01-19 | End: 2020-01-19

## 2020-01-19 RX ADMIN — INSULIN GLARGINE 17 UNIT(S): 100 INJECTION, SOLUTION SUBCUTANEOUS at 21:50

## 2020-01-19 RX ADMIN — Medication 2: at 16:56

## 2020-01-19 RX ADMIN — Medication 500 MILLIGRAM(S): at 17:03

## 2020-01-19 RX ADMIN — Medication 7 UNIT(S): at 16:56

## 2020-01-19 RX ADMIN — Medication 3 MILLIGRAM(S): at 21:51

## 2020-01-19 RX ADMIN — Medication 40 MILLIEQUIVALENT(S): at 21:51

## 2020-01-19 RX ADMIN — Medication 3 MILLILITER(S): at 21:16

## 2020-01-19 RX ADMIN — Medication 2 TABLET(S): at 21:50

## 2020-01-19 RX ADMIN — Medication 40 MILLIEQUIVALENT(S): at 17:03

## 2020-01-19 RX ADMIN — Medication 1 SPRAY(S): at 05:56

## 2020-01-19 RX ADMIN — ATORVASTATIN CALCIUM 40 MILLIGRAM(S): 80 TABLET, FILM COATED ORAL at 21:50

## 2020-01-19 RX ADMIN — BUDESONIDE AND FORMOTEROL FUMARATE DIHYDRATE 2 PUFF(S): 160; 4.5 AEROSOL RESPIRATORY (INHALATION) at 09:32

## 2020-01-19 RX ADMIN — ENOXAPARIN SODIUM 40 MILLIGRAM(S): 100 INJECTION SUBCUTANEOUS at 11:46

## 2020-01-19 RX ADMIN — CHLORHEXIDINE GLUCONATE 1 APPLICATION(S): 213 SOLUTION TOPICAL at 11:46

## 2020-01-19 RX ADMIN — Medication 500 MILLIGRAM(S): at 05:56

## 2020-01-19 RX ADMIN — Medication 7 UNIT(S): at 21:49

## 2020-01-19 RX ADMIN — Medication 3 MILLILITER(S): at 15:34

## 2020-01-19 RX ADMIN — Medication 325 MILLIGRAM(S): at 11:46

## 2020-01-19 RX ADMIN — Medication 3 MILLILITER(S): at 03:35

## 2020-01-19 RX ADMIN — BUDESONIDE AND FORMOTEROL FUMARATE DIHYDRATE 2 PUFF(S): 160; 4.5 AEROSOL RESPIRATORY (INHALATION) at 21:18

## 2020-01-19 RX ADMIN — Medication 30 MILLIGRAM(S): at 05:56

## 2020-01-19 RX ADMIN — AMLODIPINE BESYLATE 5 MILLIGRAM(S): 2.5 TABLET ORAL at 05:56

## 2020-01-19 RX ADMIN — Medication 2: at 11:45

## 2020-01-19 RX ADMIN — Medication 7 UNIT(S): at 08:16

## 2020-01-19 RX ADMIN — Medication 3 MILLILITER(S): at 09:32

## 2020-01-19 RX ADMIN — Medication 100 MILLIGRAM(S): at 21:58

## 2020-01-19 RX ADMIN — Medication 7 UNIT(S): at 11:45

## 2020-01-19 RX ADMIN — Medication 1 SPRAY(S): at 17:03

## 2020-01-19 RX ADMIN — MONTELUKAST 10 MILLIGRAM(S): 4 TABLET, CHEWABLE ORAL at 11:47

## 2020-01-19 RX ADMIN — SERTRALINE 25 MILLIGRAM(S): 25 TABLET, FILM COATED ORAL at 11:47

## 2020-01-19 NOTE — PROGRESS NOTE ADULT - NEUROLOGICAL DETAILS
responds to verbal commands/normal strength/alert and oriented x 3
responds to verbal commands/normal strength/alert and oriented x 3
alert and oriented x 3
alert and oriented x 3

## 2020-01-19 NOTE — PROGRESS NOTE ADULT - RS GEN PE MLT RESP DETAILS PC
clear to auscultation bilaterally/respirations non-labored/breath sounds equal
respirations non-labored/no wheezes/breath sounds equal/clear to auscultation bilaterally
coarse bs bilat + wheeze /rhonchi/airway patent
improved coarse bs, decreased wheezes/breath sounds equal/airway patent

## 2020-01-19 NOTE — PROGRESS NOTE ADULT - SUBJECTIVE AND OBJECTIVE BOX
CHIEF COMPLAINT:    Interval Events:    REVIEW OF SYSTEMS:  Constitutional:   Eyes:  ENT:  CV:  Resp:  GI:  :  MSK:  Integumentary:  Neurological:  Psychiatric:  Endocrine:  Hematologic/Lymphatic:  Allergic/Immunologic:  [ ] All other systems negative      OBJECTIVE:  ICU Vital Signs Last 24 Hrs  T(C): 36.9 (19 Jan 2020 05:55), Max: 36.9 (19 Jan 2020 05:55)  T(F): 98.5 (19 Jan 2020 05:55), Max: 98.5 (19 Jan 2020 05:55)  HR: 82 (19 Jan 2020 05:55) (78 - 782)  BP: 129/73 (19 Jan 2020 05:55) (123/57 - 129/73)  BP(mean): --  ABP: --  ABP(mean): --  RR: 18 (19 Jan 2020 05:55) (18 - 18)  SpO2: 97% (19 Jan 2020 05:55) (95% - 98%)        CAPILLARY BLOOD GLUCOSE      POCT Blood Glucose.: 127 mg/dL (19 Jan 2020 07:56)      PHYSICAL EXAM:  General:   HEENT:   Lymph Nodes:  Neck:   Respiratory:   Cardiovascular:   Abdomen:   Extremities:   Skin:   Neurological:  Psychiatry:    HOSPITAL MEDICATIONS:  MEDICATIONS  (STANDING):  albuterol/ipratropium for Nebulization 3 milliLiter(s) Nebulizer every 6 hours  amLODIPine   Tablet 5 milliGRAM(s) Oral daily  atorvastatin 40 milliGRAM(s) Oral at bedtime  budesonide 160 MICROgram(s)/formoterol 4.5 MICROgram(s) Inhaler 2 Puff(s) Inhalation two times a day  chlorhexidine 4% Liquid 1 Application(s) Topical daily  ciprofloxacin     Tablet 500 milliGRAM(s) Oral every 12 hours  dextrose 5%. 1000 milliLiter(s) (50 mL/Hr) IV Continuous <Continuous>  dextrose 50% Injectable 12.5 Gram(s) IV Push once  dextrose 50% Injectable 25 Gram(s) IV Push once  dextrose 50% Injectable 25 Gram(s) IV Push once  enoxaparin Injectable 40 milliGRAM(s) SubCutaneous daily  ferrous    sulfate 325 milliGRAM(s) Oral daily  fluticasone propionate 50 MICROgram(s)/spray Nasal Spray 1 Spray(s) Both Nostrils two times a day  insulin glargine Injectable (LANTUS) 17 Unit(s) SubCutaneous at bedtime  insulin lispro (HumaLOG) corrective regimen sliding scale   SubCutaneous three times a day before meals  insulin lispro (HumaLOG) corrective regimen sliding scale   SubCutaneous at bedtime  insulin lispro Injectable (HumaLOG) 7 Unit(s) SubCutaneous Before meals and at bedtime  melatonin 3 milliGRAM(s) Oral at bedtime  montelukast 10 milliGRAM(s) Oral daily  predniSONE   Tablet 30 milliGRAM(s) Oral daily  sertraline 25 milliGRAM(s) Oral daily    MEDICATIONS  (PRN):  dextrose 40% Gel 15 Gram(s) Oral once PRN Blood Glucose LESS THAN 70 milliGRAM(s)/deciliter  glucagon  Injectable 1 milliGRAM(s) IntraMuscular once PRN Glucose LESS THAN 70 milligrams/deciliter  guaiFENesin   Syrup  (Sugar-Free) 100 milliGRAM(s) Oral every 6 hours PRN Cough  simethicone 80 milliGRAM(s) Chew three times a day PRN Gas      LABS:                        12.1   8.93  )-----------( 166      ( 19 Jan 2020 06:15 )             38.1     01-19    137  |  101  |  16  ----------------------------<  115<H>  3.2<L>   |  27  |  0.46<L>    Ca    9.0      19 Jan 2020 06:15  Phos  3.8     01-19  Mg     1.7     01-19                MICROBIOLOGY:     RADIOLOGY:  [ ] Reviewed and interpreted by me    PULMONARY FUNCTION TESTS:    EKG: CHIEF COMPLAINT: Patient is a 64y old  Female who presents with a chief complaint of Exacerbation of Bronchiectasis (19 Jan 2020 08:33)      Interval Events: none overnight     REVIEW OF SYSTEMS:  Constitutional: Feeling better   CV: Denies   Resp: Denies   GI: Denies   [x ] All other systems negative      OBJECTIVE:  ICU Vital Signs Last 24 Hrs  T(C): 36.9 (19 Jan 2020 05:55), Max: 36.9 (19 Jan 2020 05:55)  T(F): 98.5 (19 Jan 2020 05:55), Max: 98.5 (19 Jan 2020 05:55)  HR: 82 (19 Jan 2020 05:55) (78 - 782)  BP: 129/73 (19 Jan 2020 05:55) (123/57 - 129/73)  BP(mean): --  ABP: --  ABP(mean): --  RR: 18 (19 Jan 2020 05:55) (18 - 18)  SpO2: 97% (19 Jan 2020 05:55) (95% - 98%)        CAPILLARY BLOOD GLUCOSE      POCT Blood Glucose.: 127 mg/dL (19 Jan 2020 07:56)      PHYSICAL EXAM:  General:   HEENT:   Lymph Nodes:  Neck:   Respiratory:   Cardiovascular:   Abdomen:   Extremities:   Skin:   Neurological:  Psychiatry:    HOSPITAL MEDICATIONS:  MEDICATIONS  (STANDING):  albuterol/ipratropium for Nebulization 3 milliLiter(s) Nebulizer every 6 hours  amLODIPine   Tablet 5 milliGRAM(s) Oral daily  atorvastatin 40 milliGRAM(s) Oral at bedtime  budesonide 160 MICROgram(s)/formoterol 4.5 MICROgram(s) Inhaler 2 Puff(s) Inhalation two times a day  chlorhexidine 4% Liquid 1 Application(s) Topical daily  ciprofloxacin     Tablet 500 milliGRAM(s) Oral every 12 hours  dextrose 5%. 1000 milliLiter(s) (50 mL/Hr) IV Continuous <Continuous>  dextrose 50% Injectable 12.5 Gram(s) IV Push once  dextrose 50% Injectable 25 Gram(s) IV Push once  dextrose 50% Injectable 25 Gram(s) IV Push once  enoxaparin Injectable 40 milliGRAM(s) SubCutaneous daily  ferrous    sulfate 325 milliGRAM(s) Oral daily  fluticasone propionate 50 MICROgram(s)/spray Nasal Spray 1 Spray(s) Both Nostrils two times a day  insulin glargine Injectable (LANTUS) 17 Unit(s) SubCutaneous at bedtime  insulin lispro (HumaLOG) corrective regimen sliding scale   SubCutaneous three times a day before meals  insulin lispro (HumaLOG) corrective regimen sliding scale   SubCutaneous at bedtime  insulin lispro Injectable (HumaLOG) 7 Unit(s) SubCutaneous Before meals and at bedtime  melatonin 3 milliGRAM(s) Oral at bedtime  montelukast 10 milliGRAM(s) Oral daily  predniSONE   Tablet 30 milliGRAM(s) Oral daily  sertraline 25 milliGRAM(s) Oral daily    MEDICATIONS  (PRN):  dextrose 40% Gel 15 Gram(s) Oral once PRN Blood Glucose LESS THAN 70 milliGRAM(s)/deciliter  glucagon  Injectable 1 milliGRAM(s) IntraMuscular once PRN Glucose LESS THAN 70 milligrams/deciliter  guaiFENesin   Syrup  (Sugar-Free) 100 milliGRAM(s) Oral every 6 hours PRN Cough  simethicone 80 milliGRAM(s) Chew three times a day PRN Gas      LABS:                        12.1   8.93  )-----------( 166      ( 19 Jan 2020 06:15 )             38.1     01-19    137  |  101  |  16  ----------------------------<  115<H>  3.2<L>   |  27  |  0.46<L>    Ca    9.0      19 Jan 2020 06:15  Phos  3.8     01-19  Mg     1.7     01-19                MICROBIOLOGY:     RADIOLOGY:  [ ] Reviewed and interpreted by me    PULMONARY FUNCTION TESTS:    EKG:

## 2020-01-19 NOTE — PROGRESS NOTE ADULT - [facility] HE PE MLT D E PC
no murmur/no rub/regular rate and rhythm
no murmur/no rub/regular rate and rhythm
regular rate and rhythm
regular rate and rhythm

## 2020-01-20 ENCOUNTER — TRANSCRIPTION ENCOUNTER (OUTPATIENT)
Age: 65
End: 2020-01-20

## 2020-01-20 ENCOUNTER — RX RENEWAL (OUTPATIENT)
Age: 65
End: 2020-01-20

## 2020-01-20 VITALS — OXYGEN SATURATION: 96 %

## 2020-01-20 LAB
ANION GAP SERPL CALC-SCNC: 9 MMO/L — SIGNIFICANT CHANGE UP (ref 7–14)
BUN SERPL-MCNC: 18 MG/DL — SIGNIFICANT CHANGE UP (ref 7–23)
CALCIUM SERPL-MCNC: 9.4 MG/DL — SIGNIFICANT CHANGE UP (ref 8.4–10.5)
CHLORIDE SERPL-SCNC: 101 MMOL/L — SIGNIFICANT CHANGE UP (ref 98–107)
CO2 SERPL-SCNC: 27 MMOL/L — SIGNIFICANT CHANGE UP (ref 22–31)
CREAT SERPL-MCNC: 0.46 MG/DL — LOW (ref 0.5–1.3)
GLUCOSE BLDC GLUCOMTR-MCNC: 183 MG/DL — HIGH (ref 70–99)
GLUCOSE BLDC GLUCOMTR-MCNC: 184 MG/DL — HIGH (ref 70–99)
GLUCOSE BLDC GLUCOMTR-MCNC: 326 MG/DL — HIGH (ref 70–99)
GLUCOSE SERPL-MCNC: 202 MG/DL — HIGH (ref 70–99)
HCT VFR BLD CALC: 39.7 % — SIGNIFICANT CHANGE UP (ref 34.5–45)
HGB BLD-MCNC: 12.6 G/DL — SIGNIFICANT CHANGE UP (ref 11.5–15.5)
MAGNESIUM SERPL-MCNC: 1.8 MG/DL — SIGNIFICANT CHANGE UP (ref 1.6–2.6)
MCHC RBC-ENTMCNC: 27.4 PG — SIGNIFICANT CHANGE UP (ref 27–34)
MCHC RBC-ENTMCNC: 31.7 % — LOW (ref 32–36)
MCV RBC AUTO: 86.3 FL — SIGNIFICANT CHANGE UP (ref 80–100)
NRBC # FLD: 0 K/UL — SIGNIFICANT CHANGE UP (ref 0–0)
PHOSPHATE SERPL-MCNC: 2.7 MG/DL — SIGNIFICANT CHANGE UP (ref 2.5–4.5)
PLATELET # BLD AUTO: 196 K/UL — SIGNIFICANT CHANGE UP (ref 150–400)
PMV BLD: 10.9 FL — SIGNIFICANT CHANGE UP (ref 7–13)
POTASSIUM SERPL-MCNC: 4 MMOL/L — SIGNIFICANT CHANGE UP (ref 3.5–5.3)
POTASSIUM SERPL-SCNC: 4 MMOL/L — SIGNIFICANT CHANGE UP (ref 3.5–5.3)
RBC # BLD: 4.6 M/UL — SIGNIFICANT CHANGE UP (ref 3.8–5.2)
RBC # FLD: 14.6 % — HIGH (ref 10.3–14.5)
SODIUM SERPL-SCNC: 137 MMOL/L — SIGNIFICANT CHANGE UP (ref 135–145)
WBC # BLD: 9.17 K/UL — SIGNIFICANT CHANGE UP (ref 3.8–10.5)
WBC # FLD AUTO: 9.17 K/UL — SIGNIFICANT CHANGE UP (ref 3.8–10.5)

## 2020-01-20 PROCEDURE — 99233 SBSQ HOSP IP/OBS HIGH 50: CPT | Mod: GC

## 2020-01-20 RX ORDER — CIPROFLOXACIN LACTATE 400MG/40ML
1 VIAL (ML) INTRAVENOUS
Qty: 6 | Refills: 0
Start: 2020-01-20 | End: 2020-01-22

## 2020-01-20 RX ADMIN — Medication 4: at 12:12

## 2020-01-20 RX ADMIN — Medication 500 MILLIGRAM(S): at 17:10

## 2020-01-20 RX ADMIN — Medication 500 MILLIGRAM(S): at 06:32

## 2020-01-20 RX ADMIN — CHLORHEXIDINE GLUCONATE 1 APPLICATION(S): 213 SOLUTION TOPICAL at 12:13

## 2020-01-20 RX ADMIN — Medication 325 MILLIGRAM(S): at 12:12

## 2020-01-20 RX ADMIN — Medication 3 MILLILITER(S): at 16:26

## 2020-01-20 RX ADMIN — ENOXAPARIN SODIUM 40 MILLIGRAM(S): 100 INJECTION SUBCUTANEOUS at 12:12

## 2020-01-20 RX ADMIN — Medication 3 MILLILITER(S): at 09:15

## 2020-01-20 RX ADMIN — Medication 30 MILLIGRAM(S): at 06:32

## 2020-01-20 RX ADMIN — Medication 1: at 17:10

## 2020-01-20 RX ADMIN — BUDESONIDE AND FORMOTEROL FUMARATE DIHYDRATE 2 PUFF(S): 160; 4.5 AEROSOL RESPIRATORY (INHALATION) at 09:15

## 2020-01-20 RX ADMIN — Medication 7 UNIT(S): at 17:10

## 2020-01-20 RX ADMIN — Medication 1: at 08:11

## 2020-01-20 RX ADMIN — Medication 7 UNIT(S): at 12:12

## 2020-01-20 RX ADMIN — Medication 1 SPRAY(S): at 17:11

## 2020-01-20 RX ADMIN — MONTELUKAST 10 MILLIGRAM(S): 4 TABLET, CHEWABLE ORAL at 12:12

## 2020-01-20 RX ADMIN — AMLODIPINE BESYLATE 5 MILLIGRAM(S): 2.5 TABLET ORAL at 06:32

## 2020-01-20 RX ADMIN — Medication 1 SPRAY(S): at 06:32

## 2020-01-20 RX ADMIN — SERTRALINE 25 MILLIGRAM(S): 25 TABLET, FILM COATED ORAL at 13:27

## 2020-01-20 RX ADMIN — Medication 7 UNIT(S): at 08:11

## 2020-01-20 NOTE — PROGRESS NOTE ADULT - SUBJECTIVE AND OBJECTIVE BOX
CHIEF COMPLAINT:    SUBJECTIVE:     [ ] All other systems negative  [ ] Unable to assess ROS because ________    OBJECTIVE:  ICU Vital Signs Last 24 Hrs  T(C): 36.6 (20 Jan 2020 06:31), Max: 37.3 (19 Jan 2020 21:47)  T(F): 97.9 (20 Jan 2020 06:31), Max: 99.1 (19 Jan 2020 21:47)  HR: 90 (20 Jan 2020 06:31) (80 - 93)  BP: 137/72 (20 Jan 2020 06:31) (127/57 - 137/72)  BP(mean): --  ABP: --  ABP(mean): --  RR: 18 (20 Jan 2020 06:31) (16 - 18)  SpO2: 94% (20 Jan 2020 06:31) (94% - 99%)        CAPILLARY BLOOD GLUCOSE      POCT Blood Glucose.: 184 mg/dL (20 Jan 2020 07:56)      PHYSICAL EXAM:  General:   HEENT:   Lymph Nodes:  Neck:   Respiratory:   Cardiovascular:   Abdomen:   Extremities:   Skin:   Neurological:  Psychiatry:    HOSPITAL MEDICATIONS:  MEDICATIONS  (STANDING):  albuterol/ipratropium for Nebulization 3 milliLiter(s) Nebulizer every 6 hours  amLODIPine   Tablet 5 milliGRAM(s) Oral daily  atorvastatin 40 milliGRAM(s) Oral at bedtime  budesonide 160 MICROgram(s)/formoterol 4.5 MICROgram(s) Inhaler 2 Puff(s) Inhalation two times a day  chlorhexidine 4% Liquid 1 Application(s) Topical daily  ciprofloxacin     Tablet 500 milliGRAM(s) Oral every 12 hours  dextrose 5%. 1000 milliLiter(s) (50 mL/Hr) IV Continuous <Continuous>  dextrose 50% Injectable 12.5 Gram(s) IV Push once  dextrose 50% Injectable 25 Gram(s) IV Push once  dextrose 50% Injectable 25 Gram(s) IV Push once  enoxaparin Injectable 40 milliGRAM(s) SubCutaneous daily  ferrous    sulfate 325 milliGRAM(s) Oral daily  fluticasone propionate 50 MICROgram(s)/spray Nasal Spray 1 Spray(s) Both Nostrils two times a day  insulin glargine Injectable (LANTUS) 17 Unit(s) SubCutaneous at bedtime  insulin lispro (HumaLOG) corrective regimen sliding scale   SubCutaneous three times a day before meals  insulin lispro (HumaLOG) corrective regimen sliding scale   SubCutaneous at bedtime  insulin lispro Injectable (HumaLOG) 7 Unit(s) SubCutaneous Before meals and at bedtime  melatonin 3 milliGRAM(s) Oral at bedtime  montelukast 10 milliGRAM(s) Oral daily  predniSONE   Tablet 30 milliGRAM(s) Oral daily  sertraline 25 milliGRAM(s) Oral daily    MEDICATIONS  (PRN):  dextrose 40% Gel 15 Gram(s) Oral once PRN Blood Glucose LESS THAN 70 milliGRAM(s)/deciliter  glucagon  Injectable 1 milliGRAM(s) IntraMuscular once PRN Glucose LESS THAN 70 milligrams/deciliter  guaiFENesin   Syrup  (Sugar-Free) 100 milliGRAM(s) Oral every 6 hours PRN Cough  simethicone 80 milliGRAM(s) Chew three times a day PRN Gas      LABS:                        12.1   8.93  )-----------( 166      ( 19 Jan 2020 06:15 )             38.1     01-19    137  |  101  |  16  ----------------------------<  115<H>  3.2<L>   |  27  |  0.46<L>    Ca    9.0      19 Jan 2020 06:15  Phos  3.8     01-19  Mg     1.7     01-19                MICROBIOLOGY:     RADIOLOGY:  [ ] Reviewed and interpreted by me    PULMONARY FUNCTION TESTS:    EKG: CHIEF COMPLAINT: Patient is a 64y old  Female who presents with a chief complaint of Exacerbation of Bronchiectasis (20 Jan 2020 13:51)    SUBJECTIVE:   No interval events overnight. Denies fever, chills, chest pain, palpitation, SOB, wheezing, dyspnea, abdominal pain, N/V/D/C.   [ x All other systems negative    OBJECTIVE:  ICU Vital Signs Last 24 Hrs  T(C): 36.6 (20 Jan 2020 06:31), Max: 37.3 (19 Jan 2020 21:47)  T(F): 97.9 (20 Jan 2020 06:31), Max: 99.1 (19 Jan 2020 21:47)  HR: 90 (20 Jan 2020 06:31) (80 - 93)  BP: 137/72 (20 Jan 2020 06:31) (127/57 - 137/72)  BP(mean): --  ABP: --  ABP(mean): --  RR: 18 (20 Jan 2020 06:31) (16 - 18)  SpO2: 94% (20 Jan 2020 06:31) (94% - 99%)    CAPILLARY BLOOD GLUCOSE  POCT Blood Glucose.: 184 mg/dL (20 Jan 2020 07:56)    PHYSICAL EXAM:  General: NAD   Cards: S1/S2, no murmurs   Pulm: CTA bilaterally. No wheezes.   Abdomen: Soft, NTND. BS (+)   Extremities: No pedal edema. CATRINA of BL upper and lower extremities.  Neurology: AOx3, CN 2-12 intact. Sensation intact.     HOSPITAL MEDICATIONS:  MEDICATIONS  (STANDING):  albuterol/ipratropium for Nebulization 3 milliLiter(s) Nebulizer every 6 hours  amLODIPine   Tablet 5 milliGRAM(s) Oral daily  atorvastatin 40 milliGRAM(s) Oral at bedtime  budesonide 160 MICROgram(s)/formoterol 4.5 MICROgram(s) Inhaler 2 Puff(s) Inhalation two times a day  chlorhexidine 4% Liquid 1 Application(s) Topical daily  ciprofloxacin     Tablet 500 milliGRAM(s) Oral every 12 hours  dextrose 5%. 1000 milliLiter(s) (50 mL/Hr) IV Continuous <Continuous>  dextrose 50% Injectable 12.5 Gram(s) IV Push once  dextrose 50% Injectable 25 Gram(s) IV Push once  dextrose 50% Injectable 25 Gram(s) IV Push once  enoxaparin Injectable 40 milliGRAM(s) SubCutaneous daily  ferrous    sulfate 325 milliGRAM(s) Oral daily  fluticasone propionate 50 MICROgram(s)/spray Nasal Spray 1 Spray(s) Both Nostrils two times a day  insulin glargine Injectable (LANTUS) 17 Unit(s) SubCutaneous at bedtime  insulin lispro (HumaLOG) corrective regimen sliding scale   SubCutaneous three times a day before meals  insulin lispro (HumaLOG) corrective regimen sliding scale   SubCutaneous at bedtime  insulin lispro Injectable (HumaLOG) 7 Unit(s) SubCutaneous Before meals and at bedtime  melatonin 3 milliGRAM(s) Oral at bedtime  montelukast 10 milliGRAM(s) Oral daily  predniSONE   Tablet 30 milliGRAM(s) Oral daily  sertraline 25 milliGRAM(s) Oral daily    MEDICATIONS  (PRN):  dextrose 40% Gel 15 Gram(s) Oral once PRN Blood Glucose LESS THAN 70 milliGRAM(s)/deciliter  glucagon  Injectable 1 milliGRAM(s) IntraMuscular once PRN Glucose LESS THAN 70 milligrams/deciliter  guaiFENesin   Syrup  (Sugar-Free) 100 milliGRAM(s) Oral every 6 hours PRN Cough  simethicone 80 milliGRAM(s) Chew three times a day PRN Gas    LABS:                        12.1   8.93  )-----------( 166      ( 19 Jan 2020 06:15 )             38.1     01-19    137  |  101  |  16  ----------------------------<  115<H>  3.2<L>   |  27  |  0.46<L>    Ca    9.0      19 Jan 2020 06:15  Phos  3.8     01-19  Mg     1.7     01-19    MICROBIOLOGY:     RADIOLOGY:  [ ] Reviewed and interpreted by me    PULMONARY FUNCTION TESTS:    EKG:

## 2020-01-20 NOTE — DISCHARGE NOTE PROVIDER - HOSPITAL COURSE
Ms. Sky is a 63 YO F with PMHx of diffuse cystitic bronchiectasis (pseduomonas and stenotrophomonas on previous cultures, intermittently uses home O2) intermittently on Prednisone, Primary Ciliary Dyskinesia, ABPA, HTN, DM2 and Anxiety/ Depression who presented with fever and SOB x 1 week in the setting of progressively worsening cough productive of green sputum. Patient admitted to RCU for Bronchiectasis Exacerbation.         During hospital course, CXR noted with BL upper lung field opacity (increased from prior) likely second to bronchiectasis however superimposed PNA cannot be ruled out. POCUS with A LINES anteriorly and BLINES laterally. RVP with negative. SCx with Pseudomonas. BCx NTD. Nebs and home Singulair and Symbicort continued. Prednisone 40 mg tapered through 1/27. Zoysn continued and switched to Ciprofloxacin thru 1/23.         Unrelated, patient also noted with thrombocytopenia on admission. Hepatitis serologies weakly reactive. RUQ with coarsened and nodule liver consistent with cirrhosis. Thrombocytopenia monitored and improved with improvement in infection therefore seen second to infection/ Sepsis.        On 1/20, case discussed with Dr. Montague and patient stable and cleared for discharge. Patient to see Dr. Young outpatient for continued monitoring and follow up. Ms. Sky is a 65 YO F with PMHx of diffuse cystitic bronchiectasis (pseduomonas and stenotrophomonas on previous cultures, intermittently uses home O2) intermittently on Prednisone, Primary Ciliary Dyskinesia, ABPA, HTN, DM2 and Anxiety/ Depression who presented with fever and SOB x 1 week in the setting of progressively worsening cough productive of green sputum. Patient admitted to RCU for Bronchiectasis Exacerbation.         During hospital course, CXR noted with BL upper lung field opacity (increased from prior) likely second to bronchiectasis however superimposed PNA cannot be ruled out. POCUS with A LINES anteriorly and BLINES laterally. RVP with negative. SCx with Pseudomonas. BCx NTD. Nebs and home Singulair and Symbicort continued. Prednisone 40 mg tapered through 1/27. Zoysn continued and switched to Ciprofloxacin thru 1/23.         Unrelated, patient also noted with thrombocytopenia on admission. Hepatitis serologies weakly reactive. RUQ with coarsened and nodule liver consistent with cirrhosis. Thrombocytopenia monitored and improved with improvement in infection therefore seen second to infection/ Sepsis. PT seen patient and recommended outpatient PT.         On 1/20, case discussed with Dr. Montague and patient stable and cleared for discharge. Patient to see Dr. Young outpatient for continued monitoring and follow up.

## 2020-01-20 NOTE — PROGRESS NOTE ADULT - PROBLEM SELECTOR PLAN 2
# Mild Thrombocytopenia   # History of Hepatitis B   -  on admission and decreasing. Monitor for now.   - Hepatitis serologies weakly reactive  -RUQ ultrasound shows Liver: Coarsened and nodular, consistent with underlying cirrhosis. No sonographically identifiable liver lesions.
# Mild Thrombocytopenia   # History of Hepatitis B   -  on admission and decreasing slightly. Monitor for now.   - Hepatitis serologies pending.
# Mild Thrombocytopenia   # History of Hepatitis B   -  on admission and decreasing. Monitor for now.   - Hepatitis serologies weakly reactive  -RUQ ultrasound shows Liver: Coarsened and nodular, consistent with underlying cirrhosis. No sonographically identifiable liver lesions.

## 2020-01-20 NOTE — PHYSICAL THERAPY INITIAL EVALUATION ADULT - PERTINENT HX OF CURRENT PROBLEM, REHAB EVAL
63 y/o female presenting with fever and SOB x 1week, progressive cough with green sputum. Pt attempted to treat with augmentin and prednisone found in house. pt admitted with exacerbation of bronchiectasis.

## 2020-01-20 NOTE — PROGRESS NOTE ADULT - ATTENDING COMMENTS
feeling better  afebrile, improving leukocytosis  f/u cx  cont zosyn  chest PT  d/w daughter in law
feeling better, still coughing -- but coughing up good amount of sputum  afebrile  cont zosyn, pedning cx results
pseudomonas, sens to quinolone, and h. flu  change to po cipro  RUQ sono, unchanged
pseudomonas, sens to quinolone, and h. flu  change to po cipro  RUQ sono, unchanged
pseudomonas, sens to quinolone, and h. flu  changed to po cipro  RUQ sono, unchanged  PT consult pending
pseudomonas, sens to quinolone, and h. flu  change to po cipro  RUQ sono, unchanged

## 2020-01-20 NOTE — CHART NOTE - NSCHARTNOTEFT_GEN_A_CORE
RCU/ MEDICINE PA NOTE     Patient passed ambulation O2 test. Rest with RA SpO2 95. Ambulation RA SpO2 remained stabled at 95. Patient DOES NOT require O2 at home. Will continue to monitor patient.     Mraiah Owusu PA-C   Department of Medicine   [facility]   In House Pager [phone]/ [phone]

## 2020-01-20 NOTE — PHYSICAL THERAPY INITIAL EVALUATION ADULT - ADDITIONAL COMMENTS
Pt lives at home with , son, daughter in law and grandchildren. pt ambulated with cane on occasion, required assistance with ADLs on occasion. Pt has assistance/supervision from family 24/7. Pt was previously able to ambulate at least 1 block. 8 steps to enter house.

## 2020-01-20 NOTE — PROGRESS NOTE ADULT - ASSESSMENT
Ms. Sky is a 65 YO F with PMHx of diffuse cystitic bronchiectasis (pseduomonas and stenotrophomonas on previous cultures, intermittently uses home O2) intermittently on Prednisone, Primary Ciliary Dyskinesia, ABPA, HTN, DM2 and Anxiety/ Depression who presented with fever and shortness of breath x 1 week in the setting of progressively worsening cough productive of green sputum. Patient admitted for exacerbation of bronchiectasis. Ms. Sky is a 63 YO F with PMHx of diffuse cystitic bronchiectasis Pseudomonas and Stenotrophomonas on previous cultures, intermittently uses home O2) intermittently on Prednisone, Primary Ciliary Dyskinesia, ABPA, HTN, DM2 and Anxiety/ Depression who presented with fever and shortness of breath x 1 week in the setting of progressively worsening cough productive of green sputum. Patient admitted for exacerbation of bronchiectasis.    # Acute on Chronic Bronchiectasis Exacerbation 2/2 BL Upper Lobe PNA   - CXR with BL upper lung field opacity (increased from prior) likely second to bronchiectasis however superimposed PNA cannot be ruled out.   - POCUS with A LINES anteriorly and B-LINES laterally.   - RVP with negative   - SCx with GPC Pseudomonas  - BCx negative x48hrs  - Nebs standing Q6H.  Prednisone taper through 1/27.   - Home Singulair and Symbicort continued.   - Zosyn switched to PO Cipro through 1/23.   - Passed ambulation O2 test.   # Mild Thrombocytopenia   # History of Hepatitis B   # Continue blood pressure medication regimen as directed. Monitor for any visual changes, headaches or dizziness.  Monitor blood pressure regularly.  Follow up with your PCP for further management for high blood pressure. 125 on admission and decreasing. Monitor for now.   - Hepatitis serologies weakly reactive  - RUQ ultrasound shows Liver: Coarsened and nodular, consistent with underlying cirrhosis. No sonographically identifiable liver lesions.  - PLT improved with improvement in infection. Monitor for now.     # HTN   - Continue on Norvasc. Monitor BP.    # DM2 A1C 8.9   - Lantus increased to 17U + low dose ISS QHS and Lispro increased to 7U+ ISS AC continued inpatient.   - Lipid Panel WNL.   - Holding home Metformin.  - Monitor FS ACHS    # DVT PPX with Lovenox    # DISPO: Patient to be discharged home today.     Mariah Owusu PA-C   Department of Medicine   [facility]   In House Pager [phone]/ [phone]

## 2020-01-20 NOTE — PROGRESS NOTE ADULT - PROBLEM SELECTOR PLAN 4
# DM2 A1C 8.9   - Lantus increased to 17U + low dose ISS QHS and Lispro increased to 7U+ ISS AC continued inpatient.   - Holding home Metformin.  -Will send Lipid panel in AM. Atorvastatin added.   - Monitor FS ACHS
# DM2 A1C 8.9   - Lantus 14 U + ISS QHS and Lispro 4 U + ISS AC continued inpatient.   - Holding home Metformin.   - Monitor FS ACHS for now.
# DM2 A1C 8.9   - Lantus increased to 17U + low dose ISS QHS and Lispro increased to 7U+ ISS AC continued inpatient.   - Holding home Metformin.  -Will send Lipid panel in AM. Atorvastatin added.   - Monitor FS ACHS

## 2020-01-20 NOTE — PROGRESS NOTE ADULT - PROBLEM SELECTOR PROBLEM 4
DM (diabetes mellitus)
DM (diabetes mellitus)
Hypertension
DM (diabetes mellitus)

## 2020-01-20 NOTE — PROGRESS NOTE ADULT - PROBLEM SELECTOR PROBLEM 1
Bronchiectasis with acute exacerbation

## 2020-01-20 NOTE — DISCHARGE NOTE PROVIDER - NSDCCPCAREPLAN_GEN_ALL_CORE_FT
PRINCIPAL DISCHARGE DIAGNOSIS  Diagnosis: Bronchiectasis with acute exacerbation  Assessment and Plan of Treatment: Bronchiectasis with acute exacerbation noted second to pseudomonas pneumonia. You were treated with steroids and IV antibiotics. Please continue on steroids with Prednisone 30mg (3 tabs) by mouth once a day on 1/21 then Prednisone 20mg (2 tabs) by mouth once a day from 1/22-1/24 then Prednisone 10mg (1 tabs) by mouth once a day from 1/25-1/27 then DONE.   Please follow up with Dr. Young and your primary care provider outpatient. Dr. Young' office was emailed for a follow up appointment and will contact you. IF you do NOT hear from the office, please call them for follow up appointment ([phone]).

## 2020-01-20 NOTE — DISCHARGE NOTE NURSING/CASE MANAGEMENT/SOCIAL WORK - PATIENT PORTAL LINK FT
You can access the FollowMyHealth Patient Portal offered by [facility] by registering at the following website: [email or web address]. By joining [employer]’s FollowMyHealth portal, you will also be able to view your health information using other applications (apps) compatible with our system.

## 2020-01-20 NOTE — PROGRESS NOTE ADULT - PROBLEM SELECTOR PROBLEM 5
DM (diabetes mellitus)
Need for prophylactic measure

## 2020-01-20 NOTE — PROGRESS NOTE ADULT - PROBLEM SELECTOR PLAN 5
# DVT PPX with Lovenox
# DVT PPX with SCD.
# DVT PPX with Lovenox

## 2020-01-20 NOTE — PROGRESS NOTE ADULT - REASON FOR ADMISSION
Exacerbation of Bronchiectasis

## 2020-01-20 NOTE — DISCHARGE NOTE PROVIDER - CARE PROVIDER_API CALL
Shelli Young)  Critical Care Medicine; Pulmonary Disease  [address] [place] [address]  Phone: [phone]  Fax: [phone]  Follow Up Time:

## 2020-01-20 NOTE — PROGRESS NOTE ADULT - PROBLEM SELECTOR PLAN 3
# HTN   - Continue on Norvasc. Monitor BP.
# HTN   - Contine on Norvasc and started Lisinopril. Monitor BP.
# HTN   - Continue on Norvasc. Monitor BP.

## 2020-01-20 NOTE — DISCHARGE NOTE PROVIDER - NSDCMRMEDTOKEN_GEN_ALL_CORE_FT
albuterol 2.5 mg/3 mL (0.083%) inhalation solution: 3 milliliter(s) inhaled every 4-6 hours as needed for shortness of breath  amLODIPine 5 mg oral tablet: 1 tab(s) orally once a day  Basaglar KwikPen 100 units/mL subcutaneous solution: 14 unit(s) subcutaneous once a day (at bedtime)  budesonide 0.5 mg/2 mL inhalation suspension: 2 milliliter(s) inhaled every 12 hours  Calcium 500+D oral tablet, chewable: 1 tab(s) orally once a day  ciprofloxacin 500 mg oral tablet: 1 tab(s) orally 2 times a day (last day on 1/23)  ferrous sulfate 324 mg (65 mg elemental iron) oral delayed release tablet: 1 tab(s) orally once a day  fluticasone 50 mcg/inh nasal spray: 1 spray(s) nasal 2 times a day  glimepiride 2 mg oral tablet: 1 tab(s) orally every 12 hours  metFORMIN 1000 mg oral tablet: 1 tab(s) orally 2 times a day  montelukast 10 mg oral tablet: 1 tab(s) orally once a day  NovoLOG FlexPen: 5 unit(s) injectable 3 times a day WITH MEALS  Perforomist 20 mcg/2 mL inhalation solution: 2 milliliter(s) inhaled every 12 hours  predniSONE 10 mg oral tablet: Start on 1/21 with 3 tabs x 1 day   2 tabs x 3 days   1 tab x 3 days then DONE on 1/27  sertraline 25 mg oral tablet: 1 tab(s) orally once a day  Symbicort 160 mcg-4.5 mcg/inh inhalation aerosol: 2 puff(s) inhaled 2 times a day  Ventolin HFA 90 mcg/inh inhalation aerosol: 2 puff(s) inhaled every 4-6 hours as needed for shortness of breath albuterol 2.5 mg/3 mL (0.083%) inhalation solution: 3 milliliter(s) inhaled every 4-6 hours as needed for shortness of breath  amLODIPine 5 mg oral tablet: 1 tab(s) orally once a day  Basaglar KwikPen 100 units/mL subcutaneous solution: 14 unit(s) subcutaneous once a day (at bedtime)  budesonide 0.5 mg/2 mL inhalation suspension: 2 milliliter(s) inhaled every 12 hours  Calcium 500+D oral tablet, chewable: 1 tab(s) orally once a day  ciprofloxacin 500 mg oral tablet: 1 tab(s) orally 2 times a day (last day on 1/23)  ferrous sulfate 324 mg (65 mg elemental iron) oral delayed release tablet: 1 tab(s) orally once a day  fluticasone 50 mcg/inh nasal spray: 1 spray(s) nasal 2 times a day  glimepiride 2 mg oral tablet: 1 tab(s) orally every 12 hours  metFORMIN 1000 mg oral tablet: 1 tab(s) orally 2 times a day  montelukast 10 mg oral tablet: 1 tab(s) orally once a day  NovoLOG FlexPen: 5 unit(s) injectable 3 times a day WITH MEALS  Outpatient Physical Therapy :   Perforomist 20 mcg/2 mL inhalation solution: 2 milliliter(s) inhaled every 12 hours  predniSONE 10 mg oral tablet: Start on 1/21 with 3 tabs x 1 day   2 tabs x 3 days   1 tab x 3 days then DONE on 1/27  sertraline 25 mg oral tablet: 1 tab(s) orally once a day  Symbicort 160 mcg-4.5 mcg/inh inhalation aerosol: 2 puff(s) inhaled 2 times a day  Ventolin HFA 90 mcg/inh inhalation aerosol: 2 puff(s) inhaled every 4-6 hours as needed for shortness of breath

## 2020-01-22 LAB — BACTERIA SPT CULT: ABNORMAL

## 2020-01-23 ENCOUNTER — APPOINTMENT (OUTPATIENT)
Dept: PULMONOLOGY | Facility: CLINIC | Age: 65
End: 2020-01-23
Payer: MEDICAID

## 2020-01-23 VITALS
SYSTOLIC BLOOD PRESSURE: 148 MMHG | HEART RATE: 81 BPM | DIASTOLIC BLOOD PRESSURE: 72 MMHG | TEMPERATURE: 98 F | HEIGHT: 63 IN | BODY MASS INDEX: 18.43 KG/M2 | WEIGHT: 104 LBS | RESPIRATION RATE: 16 BRPM | OXYGEN SATURATION: 95 %

## 2020-01-23 PROCEDURE — 99214 OFFICE O/P EST MOD 30 MIN: CPT

## 2020-01-23 NOTE — PHYSICAL EXAM
[Normal Appearance] : normal appearance [General Appearance - In No Acute Distress] : no acute distress [Normal Oropharynx] : normal oropharynx [Neck Appearance] : the appearance of the neck was normal [Heart Sounds] : normal S1 and S2 [Heart Rate And Rhythm] : heart rate and rhythm were normal [Murmurs] : no murmurs present [Exaggerated Use Of Accessory Muscles For Inspiration] : no accessory muscle use [Bowel Sounds] : normal bowel sounds [Abdomen Soft] : soft [Abdomen Tenderness] : non-tender [Nail Clubbing] : no clubbing of the fingernails [Abnormal Walk] : normal gait [Cyanosis, Localized] : no localized cyanosis [Skin Turgor] : normal skin turgor [] : no rash [Oriented To Time, Place, And Person] : oriented to person, place, and time [Motor Exam] : the motor exam was normal [No Focal Deficits] : no focal deficits [Affect] : the affect was normal [FreeTextEntry1] : scattered expiratory rhonchi bilaterlaly.

## 2020-01-23 NOTE — HISTORY OF PRESENT ILLNESS
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 She was seen last week in the office and was referred to [facility] for admission she was febrile. She had a white count of 14.93. Her RVP was negative and sputum culture grew Haemophilus influenza. Blood cultures were negative. Chest x-ray did not show pneumonia but showed increased reticular markings bilaterally. Arterial blood gas showed a pCO2 of 39 with a pO2 of 69. She was discharged after a week on ciprofloxacin and a prednisone taper.\par \par PFTs 11/2017: FEV1 39%, FVC 43%, FEV1/FVC 69%, no bronchodilatory change; TLC 84%, %, ERV 41%, RV/TLC 68%, DLCO 66% \par TTE: none \par Prev lab showed IgE 9 12/2017, Aspergillus neg, T cells normal, no eosinophilia on CBC, alpha one antitrypsin 157 WNL, mold profile negative. Mo binding lectin was 98 (normal is >100) CF mutations negative in December. \par Sweat chloride – quantity insufficient. Recommended CF genetics -tests expanded CF panel and Duplications and deletions have been sent previously and noted to be negative. 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 2/2019 grew Pseudomonas resistant to Cipro and levaquin but Sensitive to Gentamycin.  REcent Sinus CT showed sinusitis with air fluid levels.  Sinus cultures taken by Dr. Singh in July showed pan sensitive Pseudomonals.  \par \par She was discharged on Monday, accompanied by son now.  Denies fever.  Still coughing , appetite is good.  Still completing her antibioitcs- Ciprofloxacin  and completing prednisone.

## 2020-01-23 NOTE — ASSESSMENT
[FreeTextEntry1] : She has improved clinically from recent exacerbation of bronchiectasis.  Grew Hemophilus influenza.  Completing antibioitcs (CIpro) and steroids.  \par \par Lung exam is improved today, VSS\par \par Plan:\par 1- Continue budesonide/perforomist/albuterol and ventolin as needed.\par 2- continue Cipro and steroids as written\par 3- continue fluticasone monteleukast\par 4- To purchase AErobika valve from PFT lab.  She lost hers\par 5- F/U in 3 months.

## 2020-01-30 NOTE — DISCHARGE NOTE PROVIDER - NSDCQMAMI_CARD_ALL_CORE
Occupational Therapy  Cancellation/No-show Note  Patient Name:  June Choudhury   :  1951   Date:  2020  Cancelled visits to date: 1  No-shows to date: 0    Patient status for today's appointment patient:  [x]  Cancelled  []  Rescheduled appointment  []  No-show     Reason given by patient:  []  Patient ill  []  Conflicting appointment  []  No transportation    []  Conflict with work  []  No reason given  []  Other:     Comments:      Phone call information:   []  Phone call made today to patient at _ time at number provided:      []  Patient answered, conversation as follows:    []  Patient did not answer, message left as follows:  [x]  Phone call not made today    Electronically signed by:  WILLY Harmon, OTR/L No

## 2020-04-20 ENCOUNTER — RX RENEWAL (OUTPATIENT)
Age: 65
End: 2020-04-20

## 2020-06-02 ENCOUNTER — APPOINTMENT (OUTPATIENT)
Dept: ENDOCRINOLOGY | Facility: CLINIC | Age: 65
End: 2020-06-02

## 2020-06-02 ENCOUNTER — OUTPATIENT (OUTPATIENT)
Dept: OUTPATIENT SERVICES | Facility: HOSPITAL | Age: 65
LOS: 1 days | End: 2020-06-02

## 2020-06-02 DIAGNOSIS — E11.49 TYPE 2 DIABETES MELLITUS WITH OTHER DIABETIC NEUROLOGICAL COMPLICATION: ICD-10-CM

## 2020-06-02 DIAGNOSIS — E11.65 TYPE 2 DIABETES MELLITUS WITH HYPERGLYCEMIA: ICD-10-CM

## 2020-06-02 DIAGNOSIS — Z90.49 ACQUIRED ABSENCE OF OTHER SPECIFIED PARTS OF DIGESTIVE TRACT: Chronic | ICD-10-CM

## 2020-06-02 RX ORDER — INSULIN GLARGINE 100 [IU]/ML
100 INJECTION, SOLUTION SUBCUTANEOUS AT BEDTIME
Qty: 5 | Refills: 0 | Status: ACTIVE | COMMUNITY
Start: 2020-06-02 | End: 1900-01-01

## 2020-06-02 RX ORDER — PEN NEEDLE, DIABETIC 29 G X1/2"
32G X 4 MM NEEDLE, DISPOSABLE MISCELLANEOUS
Qty: 1 | Refills: 3 | Status: ACTIVE | COMMUNITY
Start: 2020-06-02 | End: 1900-01-01

## 2020-06-02 NOTE — END OF VISIT
[FreeTextEntry3] : Agree with plan to resume basal insulin along with premeal boluses and discontinue glimepiride. She understands the need to contact us when oral steroids are started. [] : Fellow [Time Spent: ___ minutes] : I have spent [unfilled] minutes of time on the encounter.

## 2020-06-02 NOTE — ASSESSMENT
[FreeTextEntry1] : \par \par Uncontrolled DM2 complicated by Neuropathy\par Patient has applied for [employer] insurance \par 1/2020 A1c  8.9%, goal <7% \par FS goal \par Check FS premeal and qhs, please keep log \par Start Basaglar 10units qhs \par Start Novolog 4units premeal,hold if NPO \par Continue Metformin 1000mg BID\par discontinue glimepiride due to hypoglycemia \par referral podiatry, uptodate ophtho\par Please obtain A1c and Microalbumin \par \par HLD \par 1/2020 LDL 87, however HDL 70\par Will continue diet restriction, monitor off medication \par Repeat lipid panel \par \par HTN \par continue amlodipine \par \par Discussed with Dr Avendaño \par \par Hunter Willis MD \par Endocrine Fellow \par \par \par Repeat labs \par

## 2020-06-02 NOTE — HISTORY OF PRESENT ILLNESS
[FreeTextEntry1] : 66 yo F with history of DM2, cystic bronchiectasis no home oxygen, HTN, HLD, Anxiety- Depression presents DM\par \par Diagnosed with DM2: 15 years ago, on labs \par 1/15/20 A1c 8.9%\par Medications:\par Novolog <200 3 units, 200-250 4units, 250-300 6units, 300-350 7units, >350 9 units\par Metformin 1000mg BID \par Glimepiride 2mg BID \par Not on basaglar, unknown how long \par \par FS: Three time daily, AM fast 150-170, before lunch 120-300, before dinner 150-250\par Hypoglycemia:after dinner, weakness, candy or cookie \par Complication: last opthalmology 12/2019 no DM retinopathy. Has numbness and tingling in hands and feet, no podiatry \par No increased urination, \par Reports Increased thirst \par \par Diet: B:eggs and two piece of brown bread, coffee  L and D: rice, chicken, broccoli, soup \par no snack, juice or soda \par \par HTN: Amlodipine \par \par HLD: 1/18/20 LDL 87, HDL 70s, not on medication

## 2020-06-10 ENCOUNTER — LABORATORY RESULT (OUTPATIENT)
Age: 65
End: 2020-06-10

## 2020-06-10 ENCOUNTER — APPOINTMENT (OUTPATIENT)
Dept: INTERNAL MEDICINE | Facility: CLINIC | Age: 65
End: 2020-06-10

## 2020-06-10 ENCOUNTER — OUTPATIENT (OUTPATIENT)
Dept: OUTPATIENT SERVICES | Facility: HOSPITAL | Age: 65
LOS: 1 days | End: 2020-06-10

## 2020-06-10 DIAGNOSIS — Z90.49 ACQUIRED ABSENCE OF OTHER SPECIFIED PARTS OF DIGESTIVE TRACT: Chronic | ICD-10-CM

## 2020-06-10 DIAGNOSIS — E11.49 TYPE 2 DIABETES MELLITUS WITH OTHER DIABETIC NEUROLOGICAL COMPLICATION: ICD-10-CM

## 2020-06-10 DIAGNOSIS — E11.65 TYPE 2 DIABETES MELLITUS WITH HYPERGLYCEMIA: ICD-10-CM

## 2020-06-10 LAB
CHOLEST SERPL-MCNC: 156 MG/DL — SIGNIFICANT CHANGE UP (ref 120–199)
HBA1C BLD-MCNC: 8.3 % — HIGH (ref 4–5.6)
HDLC SERPL-MCNC: 72 MG/DL — HIGH (ref 45–65)
LIPID PNL WITH DIRECT LDL SERPL: 79 MG/DL — SIGNIFICANT CHANGE UP
TRIGL SERPL-MCNC: 55 MG/DL — SIGNIFICANT CHANGE UP (ref 10–149)

## 2020-06-11 LAB
CREAT UR-MCNC: 44 MG/DL — SIGNIFICANT CHANGE UP
MICROALBUMIN UR-MCNC: < 1.2 MG/DL — SIGNIFICANT CHANGE UP

## 2020-06-22 ENCOUNTER — RX RENEWAL (OUTPATIENT)
Age: 65
End: 2020-06-22

## 2020-06-22 RX ORDER — ALBUTEROL SULFATE 90 UG/1
108 (90 BASE) AEROSOL, METERED RESPIRATORY (INHALATION)
Qty: 8.5 | Refills: 0 | Status: ACTIVE | COMMUNITY
Start: 2018-06-05 | End: 1900-01-01

## 2020-06-30 ENCOUNTER — APPOINTMENT (OUTPATIENT)
Dept: PULMONOLOGY | Facility: CLINIC | Age: 65
End: 2020-06-30
Payer: MEDICAID

## 2020-06-30 PROCEDURE — 99214 OFFICE O/P EST MOD 30 MIN: CPT | Mod: 95

## 2020-06-30 NOTE — HISTORY OF PRESENT ILLNESS
[TextBox_4] : Interview conducted through her son, She is asking for renewals on her medications.  \par \par On  6/24/20 had increased cough, and she took prednisone 40mg for a few days, reduced it to 20mg and is still taking it.  We last prescribed antibiotics on 4/2020 when she was given Cipro 500 BID by BROOKS Scott in our office.  \par \par She is taking augmentin on her own that she had in the home.\par \par Her nebulizer broke last night.\par \par She denies fever currently, and she is better on the augmentin and prednsione.

## 2020-06-30 NOTE — ASSESSMENT
[FreeTextEntry1] : Ms. Sky is a 65 year old woman with probable PCD, characterized by sinusitis, bronchiectasis and recurrent infections.  Has grown hemophilus influenza in January 2020 which was intermediate to Cipro.  She is on a bronchodilator regimen and has not been able to tolerate inhaled tobramycin.\par \par Has not been able to use airway clearance techniques.\par \par Plan:\par 1- will order new nebulizer machine\par 2- renew all meds\par 3- prednisone 20mg daily until nebulizer is delivered.\par 4- renew omeprazole and fluticasone as postnasal drip is likely contributing to her cough.\par Advised her son that she should follow up in the office in a few weeks for me to see her.

## 2020-06-30 NOTE — REASON FOR VISIT
[Home] : at home, [unfilled] , at the time of the visit. [Medical Office: ([facility] [place])___] : at the medical office located in  [Family Member] : family member [Verbal consent obtained from patient] : the patient, [unfilled]

## 2020-06-30 NOTE — PHYSICAL EXAM
[No Acute Distress] : no acute distress [No Resp Distress] : no resp distress [Oriented x3] : oriented x3 [Normal Affect] : normal affect

## 2020-06-30 NOTE — REVIEW OF SYSTEMS
[Fever] : no fever [Cough] : cough [Sputum] : sputum [Dyspnea] : dyspnea [GERD] : gerd [Negative] : Psychiatric

## 2020-07-07 ENCOUNTER — APPOINTMENT (OUTPATIENT)
Dept: PULMONOLOGY | Facility: CLINIC | Age: 65
End: 2020-07-07
Payer: MEDICAID

## 2020-07-07 VITALS
DIASTOLIC BLOOD PRESSURE: 77 MMHG | SYSTOLIC BLOOD PRESSURE: 135 MMHG | WEIGHT: 106 LBS | BODY MASS INDEX: 18.78 KG/M2 | RESPIRATION RATE: 16 BRPM | HEIGHT: 63 IN | HEART RATE: 86 BPM | TEMPERATURE: 99.2 F

## 2020-07-07 PROCEDURE — 94799 UNLISTED PULMONARY SVC/PX: CPT

## 2020-07-07 PROCEDURE — 99214 OFFICE O/P EST MOD 30 MIN: CPT

## 2020-07-07 NOTE — PHYSICAL EXAM
[No Resp Distress] : no resp distress [No Acute Distress] : no acute distress [Normal Affect] : normal affect [Oriented x3] : oriented x3 [No Neck Mass] : no neck mass [No Abnormalities] : no abnormalities [Rhonchi] : rhonchi [Normal Rate/Rhythm] : normal rate/rhythm [Normal Gait] : normal gait [No Clubbing] : no clubbing [No Edema] : no edema [No Cyanosis] : no cyanosis [Normal Appearance] : normal appearance [General Appearance - In No Acute Distress] : no acute distress [Normal Oropharynx] : normal oropharynx [Neck Appearance] : the appearance of the neck was normal [Heart Rate And Rhythm] : heart rate and rhythm were normal [Heart Sounds] : normal S1 and S2 [Murmurs] : no murmurs present [Exaggerated Use Of Accessory Muscles For Inspiration] : no accessory muscle use [FreeTextEntry1] : scattered inspiratory rales,  end expiratory wheeze bilaterlaly.   [Bowel Sounds] : normal bowel sounds [Abdomen Soft] : soft [Abdomen Tenderness] : non-tender [Nail Clubbing] : no clubbing of the fingernails [Abnormal Walk] : normal gait [Skin Turgor] : normal skin turgor [Cyanosis, Localized] : no localized cyanosis [] : no rash [Motor Exam] : the motor exam was normal [No Focal Deficits] : no focal deficits [Oriented To Time, Place, And Person] : oriented to person, place, and time [Affect] : the affect was normal

## 2020-07-07 NOTE — ASSESSMENT
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 Has had prior infections requiring admission. Sputum in past has grown pseudomonas resistant to fluoroquinolones, previously had grown S maltophilia. She has also grown a hemophilus with intermediate sensitivity to Ciprofloxacin. She was evaluated by ENT in July for pansinusitis on CT and culture showed sensitive pseudomonas.  Did not follow up with him.  \par \par In the last few months she has experienced increasing cough and sputum production and self- medicated with augmentin and prednisone.  Now off both.  Explained to her and her son that the more antibiotic she takes the more resistance she will develop.  Antibiotics need to be reserved for times that she is truly infected.  She may benefit from Azithromycin TIW except that she had AFB identified on one sputum in the past that never grew in culture.  \par \par PLan:\par \par 1- Bronchiectasis-: perforomist bid, budesonide bid, albuterol prn;\par 2- scheduled appointment for her to purchase aerobika valve and receive training\par 3- EKG to be performed in our office at the time of her PFT appointment\par for QT monitoring\par 4- sputum to be sent for AFB and C and S.  She could not produce sputum in the office.  \par \par If negative for AFB will trial Azithromycin TIW for her secretions.  \par Case d/w Dr. Young

## 2020-07-07 NOTE — REVIEW OF SYSTEMS
[GERD] : gerd [Fever] : no fever [Fatigue] : fatigue [Cough] : cough [Poor Appetite] : poor appetite [Sputum] : sputum  [Dyspnea] : dyspnea [Hemoptysis] : no hemoptysis [Pleuritic Pain] : no pleuritic pain [Wheezing] : wheezing [Chronically Infected with _____] : chronically infected with [unfilled] [Negative] : Endocrine

## 2020-07-07 NOTE — HISTORY OF PRESENT ILLNESS
[TextBox_4] : Returns for follow up today accompanied by her son.  \par \par On  6/24/20 had increased cough, and she took prednisone 40mg for a few days, reduced it to 20mg and is still taking it.  We last prescribed antibiotics on 4/2020 when she was given Cipro 500 BID by BROOKS Scott in our office.  \par \par She is taking augmentin on her own that she had in the home.\par \par Her nebulizer broke last night.\par \par She denies fever currently, and she is better on the augmentin and prednsione.\par \par At televisit on 6/30/20 I advised that she complete course of augmentin and gave her additional prednisone until I could evaluate her.  Karrielarrys for follow up today accompanied by her son.\par \par She is feeling better but continues to cough and produce sputum.  She denies fever.  Wants more antibiotics.  Glucoses have been elevated and she is being referred to an endocrinologist by PCP.   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 Mo binding lectin was 98 (normal is >100) CF mutations negative in December. \par Sweat chloride – quantity insufficient. Recommended CF genetics -tests expanded CF panel and Duplications and deletions have been sent previously and noted to be negative. 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 2/2019 grew Pseudomonas resistant to Cipro and levaquin but Sensitive to Gentamycin.  REcent Sinus CT showed sinusitis with air fluid levels.  Sinus cultures taken by Dr. Singh in July showed pan sensitive Pseudomonals.  \par \par She cannot tolerate the Bethkis- states that it causes breathing difficulty and feels as if mucous gets stuck.  \par \par Coomes in accompanied by daughter.  Has had chills for 5-6 days, fever for 2 days.  Last night was 102.  Has body aches/pain.  Feels short of breath, is coughing, bringing up yellow green sputum.  \par she is using budesonide /formoterol and symbicort.  Albuterol via nebulizer ventolin as needed.  Also took some prednsione from [facility] about 10 days ago.

## 2020-07-09 ENCOUNTER — NON-APPOINTMENT (OUTPATIENT)
Age: 65
End: 2020-07-09

## 2020-07-09 ENCOUNTER — APPOINTMENT (OUTPATIENT)
Dept: PULMONOLOGY | Facility: CLINIC | Age: 65
End: 2020-07-09
Payer: MEDICAID

## 2020-07-09 PROCEDURE — 36415 COLL VENOUS BLD VENIPUNCTURE: CPT

## 2020-07-13 NOTE — PATIENT PROFILE ADULT - NSPROMUTPARTICIPCAREFT_GEN_A_NUR
Report called to [facility] , patient taken up in stable condition. No distress noted.       Frank Wilson RN  07/13/20 5990 Patient prefers for son and daughter to translate

## 2020-07-16 ENCOUNTER — APPOINTMENT (OUTPATIENT)
Dept: PULMONOLOGY | Facility: CLINIC | Age: 65
End: 2020-07-16
Payer: MEDICAID

## 2020-07-16 PROCEDURE — 99214 OFFICE O/P EST MOD 30 MIN: CPT | Mod: 95

## 2020-07-16 NOTE — ASSESSMENT
[FreeTextEntry1] : Ms. Sky is a 65 year old woman with probable PCD, characterized by sinusitis, bronchiectasis and recurrent infections.  Has grown hemophilus influenza in January 2020 which was intermediate to Cipro.  She is on a bronchodilator regimen and has not been able to tolerate inhaled tobramycin.Now growing Pseudomonas, resistant to cipro but sensitive to Tobramycin.  IMportance of airway clearance explained to her and daughter in law.  She is using Aerobika valve.  \par \par \par Plan:\par 1- INhaled NELLY ordered, one month on one month off.  She is to use albuterol prior to NELLY.\par 2- continue current nebulized regimen\par 3- encouraged to use albuterol before bedtime and to use aerobika 3x per day to clear secretions.  \par F/U in 2 weeks.\par \par Advised to go to hospital if she develops fever, increasing dyspnea.  Will need IV antibiotics to treat pseudomonas.

## 2020-07-16 NOTE — REASON FOR VISIT
[Home] : at home, [unfilled] , at the time of the visit. [Medical Office: ([facility])___] : at the medical office located in  [Verbal consent obtained from patient] : the patient, [unfilled] [Follow-Up] : a follow-up visit [Family Member] : family member

## 2020-07-16 NOTE — HISTORY OF PRESENT ILLNESS
[TextBox_4] : Continues to cough and have sputum production- choking from the mucous.  Using the aerobika valve, asking about antibiotics.  \par Sputum grew mucin producing pseudomonas resistant to cipro, intermediate to levaquin, sensitive to meropenam and aminoglycosides.  \par AFB is negative thus far.  QT interval is fine on EKG  She had a low grade fever < 100, is not taking prednisone currently and using her nebulized ICS/LABA.

## 2020-07-16 NOTE — PHYSICAL EXAM
[No Acute Distress] : no acute distress [No Resp Distress] : no resp distress [Oriented x3] : oriented x3

## 2020-07-20 LAB — BACTERIA SPT CULT: ABNORMAL

## 2020-07-23 ENCOUNTER — NON-APPOINTMENT (OUTPATIENT)
Age: 65
End: 2020-07-23

## 2020-07-28 ENCOUNTER — RX RENEWAL (OUTPATIENT)
Age: 65
End: 2020-07-28

## 2020-07-28 RX ORDER — NEBULIZER AND COMPRESSOR
EACH MISCELLANEOUS
Qty: 1 | Refills: 0 | Status: ACTIVE | COMMUNITY
Start: 2020-07-28 | End: 1900-01-01

## 2020-08-10 ENCOUNTER — INPATIENT (INPATIENT)
Facility: HOSPITAL | Age: 65
LOS: 8 days | Discharge: HOME HEALTH | End: 2020-08-19
Attending: INTERNAL MEDICINE | Admitting: INTERNAL MEDICINE
Payer: MEDICAID

## 2020-08-10 VITALS
DIASTOLIC BLOOD PRESSURE: 82 MMHG | OXYGEN SATURATION: 93 % | SYSTOLIC BLOOD PRESSURE: 144 MMHG | TEMPERATURE: 99 F | RESPIRATION RATE: 18 BRPM | HEART RATE: 91 BPM

## 2020-08-10 DIAGNOSIS — R06.02 SHORTNESS OF BREATH: ICD-10-CM

## 2020-08-10 DIAGNOSIS — J47.1 BRONCHIECTASIS WITH (ACUTE) EXACERBATION: ICD-10-CM

## 2020-08-10 DIAGNOSIS — Z90.49 ACQUIRED ABSENCE OF OTHER SPECIFIED PARTS OF DIGESTIVE TRACT: Chronic | ICD-10-CM

## 2020-08-10 DIAGNOSIS — Z79.899 OTHER LONG TERM (CURRENT) DRUG THERAPY: ICD-10-CM

## 2020-08-10 DIAGNOSIS — E11.69 TYPE 2 DIABETES MELLITUS WITH OTHER SPECIFIED COMPLICATION: ICD-10-CM

## 2020-08-10 LAB
ALBUMIN SERPL ELPH-MCNC: 3.6 G/DL — SIGNIFICANT CHANGE UP (ref 3.3–5)
ALP SERPL-CCNC: 106 U/L — SIGNIFICANT CHANGE UP (ref 40–120)
ALT FLD-CCNC: 17 U/L — SIGNIFICANT CHANGE UP (ref 4–33)
ANION GAP SERPL CALC-SCNC: 10 MMO/L — SIGNIFICANT CHANGE UP (ref 7–14)
AST SERPL-CCNC: 39 U/L — HIGH (ref 4–32)
B PERT DNA SPEC QL NAA+PROBE: NOT DETECTED — SIGNIFICANT CHANGE UP
BASE EXCESS BLDV CALC-SCNC: 4.5 MMOL/L — SIGNIFICANT CHANGE UP
BASOPHILS # BLD AUTO: 0.06 K/UL — SIGNIFICANT CHANGE UP (ref 0–0.2)
BASOPHILS NFR BLD AUTO: 0.4 % — SIGNIFICANT CHANGE UP (ref 0–2)
BILIRUB SERPL-MCNC: 0.3 MG/DL — SIGNIFICANT CHANGE UP (ref 0.2–1.2)
BLOOD GAS VENOUS - CREATININE: 0.41 MG/DL — LOW (ref 0.5–1.3)
BUN SERPL-MCNC: 16 MG/DL — SIGNIFICANT CHANGE UP (ref 7–23)
C PNEUM DNA SPEC QL NAA+PROBE: NOT DETECTED — SIGNIFICANT CHANGE UP
CALCIUM SERPL-MCNC: 9.3 MG/DL — SIGNIFICANT CHANGE UP (ref 8.4–10.5)
CHLORIDE BLDV-SCNC: 100 MMOL/L — SIGNIFICANT CHANGE UP (ref 96–108)
CHLORIDE SERPL-SCNC: 94 MMOL/L — LOW (ref 98–107)
CO2 SERPL-SCNC: 27 MMOL/L — SIGNIFICANT CHANGE UP (ref 22–31)
CREAT SERPL-MCNC: 0.34 MG/DL — LOW (ref 0.5–1.3)
EOSINOPHIL # BLD AUTO: 0.37 K/UL — SIGNIFICANT CHANGE UP (ref 0–0.5)
EOSINOPHIL NFR BLD AUTO: 2.7 % — SIGNIFICANT CHANGE UP (ref 0–6)
FLUAV H1 2009 PAND RNA SPEC QL NAA+PROBE: NOT DETECTED — SIGNIFICANT CHANGE UP
FLUAV H1 RNA SPEC QL NAA+PROBE: NOT DETECTED — SIGNIFICANT CHANGE UP
FLUAV H3 RNA SPEC QL NAA+PROBE: NOT DETECTED — SIGNIFICANT CHANGE UP
FLUAV SUBTYP SPEC NAA+PROBE: NOT DETECTED — SIGNIFICANT CHANGE UP
FLUBV RNA SPEC QL NAA+PROBE: NOT DETECTED — SIGNIFICANT CHANGE UP
GAS PNL BLDV: 130 MMOL/L — LOW (ref 136–146)
GLUCOSE BLDC GLUCOMTR-MCNC: 252 MG/DL — HIGH (ref 70–99)
GLUCOSE BLDC GLUCOMTR-MCNC: 307 MG/DL — HIGH (ref 70–99)
GLUCOSE BLDV-MCNC: 184 MG/DL — HIGH (ref 70–99)
GLUCOSE SERPL-MCNC: 193 MG/DL — HIGH (ref 70–99)
HADV DNA SPEC QL NAA+PROBE: NOT DETECTED — SIGNIFICANT CHANGE UP
HCO3 BLDV-SCNC: 28 MMOL/L — HIGH (ref 20–27)
HCOV PNL SPEC NAA+PROBE: SIGNIFICANT CHANGE UP
HCT VFR BLD CALC: 45.8 % — HIGH (ref 34.5–45)
HCT VFR BLDV CALC: 43.3 % — SIGNIFICANT CHANGE UP (ref 34.5–45)
HGB BLD-MCNC: 14.1 G/DL — SIGNIFICANT CHANGE UP (ref 11.5–15.5)
HGB BLDV-MCNC: 14.1 G/DL — SIGNIFICANT CHANGE UP (ref 11.5–15.5)
HMPV RNA SPEC QL NAA+PROBE: NOT DETECTED — SIGNIFICANT CHANGE UP
HPIV1 RNA SPEC QL NAA+PROBE: NOT DETECTED — SIGNIFICANT CHANGE UP
HPIV2 RNA SPEC QL NAA+PROBE: NOT DETECTED — SIGNIFICANT CHANGE UP
HPIV3 RNA SPEC QL NAA+PROBE: NOT DETECTED — SIGNIFICANT CHANGE UP
HPIV4 RNA SPEC QL NAA+PROBE: NOT DETECTED — SIGNIFICANT CHANGE UP
IMM GRANULOCYTES NFR BLD AUTO: 0.6 % — SIGNIFICANT CHANGE UP (ref 0–1.5)
LACTATE BLDV-MCNC: 2.2 MMOL/L — HIGH (ref 0.5–2)
LYMPHOCYTES # BLD AUTO: 1.9 K/UL — SIGNIFICANT CHANGE UP (ref 1–3.3)
LYMPHOCYTES # BLD AUTO: 14.1 % — SIGNIFICANT CHANGE UP (ref 13–44)
MCHC RBC-ENTMCNC: 27.2 PG — SIGNIFICANT CHANGE UP (ref 27–34)
MCHC RBC-ENTMCNC: 30.8 % — LOW (ref 32–36)
MCV RBC AUTO: 88.2 FL — SIGNIFICANT CHANGE UP (ref 80–100)
MONOCYTES # BLD AUTO: 0.96 K/UL — HIGH (ref 0–0.9)
MONOCYTES NFR BLD AUTO: 7.1 % — SIGNIFICANT CHANGE UP (ref 2–14)
NEUTROPHILS # BLD AUTO: 10.15 K/UL — HIGH (ref 1.8–7.4)
NEUTROPHILS NFR BLD AUTO: 75.1 % — SIGNIFICANT CHANGE UP (ref 43–77)
NRBC # FLD: 0 K/UL — SIGNIFICANT CHANGE UP (ref 0–0)
NT-PROBNP SERPL-SCNC: 61.95 PG/ML — SIGNIFICANT CHANGE UP
PCO2 BLDV: 51 MMHG — SIGNIFICANT CHANGE UP (ref 41–51)
PH BLDV: 7.38 PH — SIGNIFICANT CHANGE UP (ref 7.32–7.43)
PLATELET # BLD AUTO: 175 K/UL — SIGNIFICANT CHANGE UP (ref 150–400)
PMV BLD: 11 FL — SIGNIFICANT CHANGE UP (ref 7–13)
PO2 BLDV: 78 MMHG — HIGH (ref 35–40)
POTASSIUM BLDV-SCNC: 4.4 MMOL/L — SIGNIFICANT CHANGE UP (ref 3.4–4.5)
POTASSIUM SERPL-MCNC: 5.3 MMOL/L — SIGNIFICANT CHANGE UP (ref 3.5–5.3)
POTASSIUM SERPL-SCNC: 5.3 MMOL/L — SIGNIFICANT CHANGE UP (ref 3.5–5.3)
PROT SERPL-MCNC: 7.2 G/DL — SIGNIFICANT CHANGE UP (ref 6–8.3)
RBC # BLD: 5.19 M/UL — SIGNIFICANT CHANGE UP (ref 3.8–5.2)
RBC # FLD: 13.6 % — SIGNIFICANT CHANGE UP (ref 10.3–14.5)
RSV RNA SPEC QL NAA+PROBE: NOT DETECTED — SIGNIFICANT CHANGE UP
RV+EV RNA SPEC QL NAA+PROBE: NOT DETECTED — SIGNIFICANT CHANGE UP
SAO2 % BLDV: 94.5 % — HIGH (ref 60–85)
SARS-COV-2 RNA SPEC QL NAA+PROBE: SIGNIFICANT CHANGE UP
SODIUM SERPL-SCNC: 131 MMOL/L — LOW (ref 135–145)
TROPONIN T, HIGH SENSITIVITY: < 6 NG/L — SIGNIFICANT CHANGE UP (ref ?–14)
WBC # BLD: 13.52 K/UL — HIGH (ref 3.8–10.5)
WBC # FLD AUTO: 13.52 K/UL — HIGH (ref 3.8–10.5)

## 2020-08-10 PROCEDURE — 71045 X-RAY EXAM CHEST 1 VIEW: CPT | Mod: 26

## 2020-08-10 PROCEDURE — 99284 EMERGENCY DEPT VISIT MOD MDM: CPT

## 2020-08-10 PROCEDURE — 99223 1ST HOSP IP/OBS HIGH 75: CPT

## 2020-08-10 RX ORDER — INSULIN GLARGINE 100 [IU]/ML
10 INJECTION, SOLUTION SUBCUTANEOUS AT BEDTIME
Refills: 0 | Status: DISCONTINUED | OUTPATIENT
Start: 2020-08-10 | End: 2020-08-11

## 2020-08-10 RX ORDER — IPRATROPIUM/ALBUTEROL SULFATE 18-103MCG
3 AEROSOL WITH ADAPTER (GRAM) INHALATION EVERY 6 HOURS
Refills: 0 | Status: DISCONTINUED | OUTPATIENT
Start: 2020-08-10 | End: 2020-08-11

## 2020-08-10 RX ORDER — CIPROFLOXACIN LACTATE 400MG/40ML
500 VIAL (ML) INTRAVENOUS EVERY 12 HOURS
Refills: 0 | Status: DISCONTINUED | OUTPATIENT
Start: 2020-08-10 | End: 2020-08-11

## 2020-08-10 RX ORDER — INSULIN DETEMIR 100/ML (3)
10 INSULIN PEN (ML) SUBCUTANEOUS AT BEDTIME
Refills: 0 | Status: DISCONTINUED | OUTPATIENT
Start: 2020-08-10 | End: 2020-08-10

## 2020-08-10 RX ORDER — IPRATROPIUM/ALBUTEROL SULFATE 18-103MCG
3 AEROSOL WITH ADAPTER (GRAM) INHALATION ONCE
Refills: 0 | Status: COMPLETED | OUTPATIENT
Start: 2020-08-10 | End: 2020-08-10

## 2020-08-10 RX ORDER — SODIUM CHLORIDE 9 MG/ML
1000 INJECTION, SOLUTION INTRAVENOUS
Refills: 0 | Status: DISCONTINUED | OUTPATIENT
Start: 2020-08-10 | End: 2020-08-19

## 2020-08-10 RX ORDER — INSULIN LISPRO 100/ML
VIAL (ML) SUBCUTANEOUS AT BEDTIME
Refills: 0 | Status: DISCONTINUED | OUTPATIENT
Start: 2020-08-10 | End: 2020-08-11

## 2020-08-10 RX ORDER — DEXTROSE 50 % IN WATER 50 %
25 SYRINGE (ML) INTRAVENOUS ONCE
Refills: 0 | Status: DISCONTINUED | OUTPATIENT
Start: 2020-08-10 | End: 2020-08-19

## 2020-08-10 RX ORDER — DEXTROSE 50 % IN WATER 50 %
15 SYRINGE (ML) INTRAVENOUS ONCE
Refills: 0 | Status: DISCONTINUED | OUTPATIENT
Start: 2020-08-10 | End: 2020-08-19

## 2020-08-10 RX ORDER — DEXTROSE 50 % IN WATER 50 %
12.5 SYRINGE (ML) INTRAVENOUS ONCE
Refills: 0 | Status: DISCONTINUED | OUTPATIENT
Start: 2020-08-10 | End: 2020-08-19

## 2020-08-10 RX ORDER — CIPROFLOXACIN LACTATE 400MG/40ML
400 VIAL (ML) INTRAVENOUS EVERY 12 HOURS
Refills: 0 | Status: DISCONTINUED | OUTPATIENT
Start: 2020-08-10 | End: 2020-08-10

## 2020-08-10 RX ORDER — ENOXAPARIN SODIUM 100 MG/ML
40 INJECTION SUBCUTANEOUS DAILY
Refills: 0 | Status: DISCONTINUED | OUTPATIENT
Start: 2020-08-10 | End: 2020-08-19

## 2020-08-10 RX ORDER — GLUCAGON INJECTION, SOLUTION 0.5 MG/.1ML
1 INJECTION, SOLUTION SUBCUTANEOUS ONCE
Refills: 0 | Status: DISCONTINUED | OUTPATIENT
Start: 2020-08-10 | End: 2020-08-19

## 2020-08-10 RX ORDER — INSULIN LISPRO 100/ML
VIAL (ML) SUBCUTANEOUS
Refills: 0 | Status: DISCONTINUED | OUTPATIENT
Start: 2020-08-10 | End: 2020-08-11

## 2020-08-10 RX ADMIN — INSULIN GLARGINE 10 UNIT(S): 100 INJECTION, SOLUTION SUBCUTANEOUS at 21:40

## 2020-08-10 RX ADMIN — Medication 4: at 19:47

## 2020-08-10 RX ADMIN — Medication 1: at 21:39

## 2020-08-10 RX ADMIN — Medication 3 MILLILITER(S): at 15:00

## 2020-08-10 RX ADMIN — Medication 40 MILLIGRAM(S): at 14:59

## 2020-08-10 NOTE — H&P ADULT - HISTORY OF PRESENT ILLNESS
64 yo f 64 yo f  with listed h/o  diffuse cystitic bronchiectasis (pseduomonas and stenotrophomonas on previous cultures) intermittently on prednisone, primary ciliary dyskinesia, history of ABPA, HTN, DM. Daughter on phone translating. Notes worsening productive cough with associated sob, cp when coughing, intermittent low grade fever over last 2 weeks. Pt NOT on home O2. Reports over last few days being placed on inhaled tobramycin, medrol pack, Symbicort replaced by Perforomist/Budesonide, but notes cough has worsened since. CXR with unchanged  BL hazy opacities. Reports improved sob/cough since ed arrival after receiving Duoneb and and solumedrol

## 2020-08-10 NOTE — H&P ADULT - NSHPPHYSICALEXAM_GEN_ALL_CORE
PHYSICAL EXAM:      Constitutional: NAD, well-groomed, well-developed  HEENT:  EOMI, Normal Hearing  Neck: No LAD, No JVD  Back: Normal spine flexure, No CVA tenderness  Respiratory: mildly  congested lung sounds   Cardiovascular: S1 and S2, RRR, no M/G/R  Gastrointestinal: BS+, soft, NT/ND  Extremities: No peripheral edema  Vascular: 2+ peripheral pulses  Neurological: A/O x 3, no focal deficits  Psychiatric: Normal mood, normal affect  Musculoskeletal: 5/5 strength b/l upper and lower extremities  Skin: No rashes

## 2020-08-10 NOTE — H&P ADULT - NSICDXFAMILYHX_GEN_ALL_CORE_FT
FAMILY HISTORY:  Family history of bronchitis, parents  Family history of diabetes mellitus, father    Child  Still living? Unknown  Family history of allergic rhinitis, Age at diagnosis: Age Unknown  Family history of allergic rhinitis, Age at diagnosis: Age Unknown

## 2020-08-10 NOTE — ED PROVIDER NOTE - PROGRESS NOTE DETAILS
consulted pulm for possible  RCU admission and to discuss need for abx Daytime RCU/Pulm fellow sat pt in the ER and was in agreement w/ pt being admitted, holding off on abx, and pt going ot RCU however stated that the RCU does not have bed availabiltiy now. He spoke to MAR and stated that the plan would be for pt to be admitted to a medicine bed and then be transferred once the RCU gets a bed tomorrow; it is not clear whether the accepting would be pulmonologist or hospitalist however the daytime RCU fellow went home. The nighttime RCU/covering Pulm fellowstated that the pulmonology attendings usually do not accept patients who are not going to RCU at night. Will admit to medicine at this time

## 2020-08-10 NOTE — ED PROVIDER NOTE - OBJECTIVE STATEMENT
65 F PMHx of diffuse cystitic bronchiectasis (pseduomonas and stenotrophomonas on previous cultures, intermittently uses home O2, home SpO2 80s to 90s but very SoB on 90s) intermittently on Prednisone, Primary Ciliary Dyskinesia, ABPA, HTN, DM2 and Anxiety/ Depression, presenting for SoB. has been having increased cough and sob for 2 months but just getting worse, cant sleep at night, feeling generally weak. Has been using albuterol q4h over the last few days. 4 days ago recently started on an antibiotic via neb by her pulmonologist buth aving persistent SoB and cough. Occasionally has CP but does not have at this time. Tm 100 at home. No known sick contacts or COVID contacts, leg swelling or pain.    Pulm: Dr Young  PMD Edi Qad1ri 65 F PMHx of diffuse cystitic bronchiectasis (pseduomonas and stenotrophomonas on previous cultures, intermittently uses home O2--however per daughter over the phone today,  states she does not use O2, home SpO2 80s to 90s but very SoB on 90s) intermittently on Prednisone, Primary Ciliary Dyskinesia, ABPA, HTN, DM2 and Anxiety/ Depression, presenting for SoB. has been having increased cough and sob for 2 months but just getting worse, cant sleep at night, feeling generally weak. Has been using albuterol q4h over the last few days. 4 days ago recently started on an antibiotic via neb by her pulmonologist buth aving persistent SoB and cough. Occasionally has CP but does not have at this time. Tm 100 at home. No known sick contacts or COVID contacts, leg swelling or pain.    Pulm: Dr Young  PMD Edi Qad1ri

## 2020-08-10 NOTE — ED ADULT NURSE NOTE - NSIMPLEMENTINTERV_GEN_ALL_ED
Implemented All Fall Risk Interventions:  [place] to call system. Call bell, personal items and telephone within reach. Instruct patient to call for assistance. Room bathroom lighting operational. Non-slip footwear when patient is off stretcher. Physically safe environment: no spills, clutter or unnecessary equipment. Stretcher in lowest position, wheels locked, appropriate side rails in place. Provide visual cue, wrist band, yellow gown, etc. Monitor gait and stability. Monitor for mental status changes and reorient to person, place, and time. Review medications for side effects contributing to fall risk. Reinforce activity limits and safety measures with patient and family.

## 2020-08-10 NOTE — ED ADULT NURSE NOTE - OBJECTIVE STATEMENT
66 y/o female presents to #9 with difficulty breathing, cough, sob x4 days. Pt with pmh of cystitic bronchiectasis on intermittent prednisone, albuterol. Pt has been feeling chest heaviness and sob, + productive cough noted with green sputum. Pt ambulates at baseline but due to sob has not been able to ambulate. Cardiac monitor in place. MD at bedside for eval. Orders to follow. Will monitor.

## 2020-08-10 NOTE — ED PROVIDER NOTE - CLINICAL SUMMARY MEDICAL DECISION MAKING FREE TEXT BOX
Pt p/w sob and cough chronically worsening, suspect exacerbation of chronic lung disease. Plan: Nebs, steroids, pulm c/s, likely admission.

## 2020-08-10 NOTE — H&P ADULT - NSHPLABSRESULTS_GEN_ALL_CORE
14.1   13.52 )-----------( 175      ( 10 Aug 2020 15:44 )             45.8     08-10    131<L>  |  94<L>  |  16  ----------------------------<  193<H>  5.3   |  27  |  0.34<L>    Ca    9.3      10 Aug 2020 15:00    TPro  7.2  /  Alb  3.6  /  TBili  0.3  /  DBili  x   /  AST  39<H>  /  ALT  17  /  AlkPhos  106  08-10    CAPILLARY BLOOD GLUCOSE      POCT Blood Glucose.: 307 mg/dL (10 Aug 2020 19:37)        Vital Signs Last 24 Hrs  T(C): 37.2 (10 Aug 2020 16:15), Max: 37.3 (10 Aug 2020 13:29)  T(F): 99 (10 Aug 2020 16:15), Max: 99.2 (10 Aug 2020 13:29)  HR: 81 (10 Aug 2020 16:15) (81 - 91)  BP: 136/64 (10 Aug 2020 16:15) (136/64 - 167/79)  BP(mean): --  RR: 20 (10 Aug 2020 16:15) (18 - 30)  SpO2: 100% (10 Aug 2020 16:15) (93% - 100%)

## 2020-08-10 NOTE — H&P ADULT - NSHPREVIEWOFSYSTEMS_GEN_ALL_CORE
Review of Systems:   CONSTITUTIONAL: No fever, weight loss, or fatigue  EYES: No eye pain, visual disturbances, or discharge  ENMT:  No difficulty hearing  NECK: No pain or stiffness  RESPIRATORY:  cough, wheezing,  shortness of breath  CARDIOVASCULAR: chest pain with cough, no palpitations, dizziness, or leg swelling  GASTROINTESTINAL: No abdominal or epigastric pain. No nausea, vomiting, or hematemesis; No diarrhea or constipation. No melena or hematochezia.  GENITOURINARY: No dysuria, frequency, hematuria, or incontinence  NEUROLOGICAL: left temporal HA  SKIN: No itching, burning, rashes, or lesions   MUSCULOSKELETAL: No joint pain or swelling; No muscle, back, or extremity pain

## 2020-08-10 NOTE — ED PROVIDER NOTE - ATTENDING CONTRIBUTION TO CARE
65F with pmh diffuse cystic bronchiectasis, Primary Ciliary Dyskinesia, ABPA, HTN, DM2 and Anxiety/ Depression, home O2 intermittently as needed presenting with increased sputum production, cough x2 months worse past few days. Was evaluated by her pulmonologist who placed on her tobramycin nebulizers however there has been no significant relief. States because of symptoms cannot sleep, feels like she is choking in her sputum sometimes. Denies fever, chills, vomiting, diarrhea, dysuria     GEN: no apparent distress, intermittently coughing, mentating full sentences   HEENT: NCAT, MMM, normal conjunctiva, perrl  CHEST/LUNGS: tachypneic, mild diffuse rhonchi   CARDIAC: Non-tachycardic, s1s2, normal perfusion, no peripheral edema  ABDOMEN: Soft, NTND, No rebound/guarding  MSK: No joint tenderness, no gross deformity of extremities  SKIN: No rashes, no petechiae, no vesicles  NEURO: CN grossly intact, normal coordination, no focal motor or sensory deficits    Patient presenting acute on chronic flare of bronchiectasis. Will discuss with pulmonology about further course of care. No obvious acute infectious signs or symptoms.

## 2020-08-10 NOTE — H&P ADULT - PROBLEM SELECTOR PLAN 1
-will continue standing duonebs, steroids  -place on ciprofloxacin; defer to pulmonary in am if to continue inhaled tobramycin  -c/w Symbicort  -tessalon prn -will continue standing duonebs, steroids  -place on ciprofloxacin; defer to pulmonary in am if to continue inhaled tobramycin  -c/w Symbicort  -tessalon prn  -will need to contact pulm in am; possible transfer to RCU once bed becomes available -will continue standing duonebs, steroids  -place on ciprofloxacin; defer to pulmonary in am if to continue inhaled tobramycin  -c/w Symbicort  -tessalon prn  -will need to contact pulm in am; possible transfer to RCU once bed becomes available  -covid, rvp resulting

## 2020-08-11 DIAGNOSIS — J96.01 ACUTE RESPIRATORY FAILURE WITH HYPOXIA: ICD-10-CM

## 2020-08-11 DIAGNOSIS — Z29.9 ENCOUNTER FOR PROPHYLACTIC MEASURES, UNSPECIFIED: ICD-10-CM

## 2020-08-11 LAB
ANION GAP SERPL CALC-SCNC: 9 MMO/L — SIGNIFICANT CHANGE UP (ref 7–14)
APTT BLD: 28 SEC — SIGNIFICANT CHANGE UP (ref 27–36.3)
BASOPHILS # BLD AUTO: 0.02 K/UL — SIGNIFICANT CHANGE UP (ref 0–0.2)
BASOPHILS NFR BLD AUTO: 0.2 % — SIGNIFICANT CHANGE UP (ref 0–2)
BUN SERPL-MCNC: 20 MG/DL — SIGNIFICANT CHANGE UP (ref 7–23)
CALCIUM SERPL-MCNC: 9.4 MG/DL — SIGNIFICANT CHANGE UP (ref 8.4–10.5)
CHLORIDE SERPL-SCNC: 95 MMOL/L — LOW (ref 98–107)
CO2 SERPL-SCNC: 29 MMOL/L — SIGNIFICANT CHANGE UP (ref 22–31)
CREAT SERPL-MCNC: 0.38 MG/DL — LOW (ref 0.5–1.3)
EOSINOPHIL # BLD AUTO: 0.04 K/UL — SIGNIFICANT CHANGE UP (ref 0–0.5)
EOSINOPHIL NFR BLD AUTO: 0.4 % — SIGNIFICANT CHANGE UP (ref 0–6)
GLUCOSE BLDC GLUCOMTR-MCNC: 201 MG/DL — HIGH (ref 70–99)
GLUCOSE BLDC GLUCOMTR-MCNC: 276 MG/DL — HIGH (ref 70–99)
GLUCOSE BLDC GLUCOMTR-MCNC: 285 MG/DL — HIGH (ref 70–99)
GLUCOSE BLDC GLUCOMTR-MCNC: 300 MG/DL — HIGH (ref 70–99)
GLUCOSE SERPL-MCNC: 372 MG/DL — HIGH (ref 70–99)
GRAM STN FLD: SIGNIFICANT CHANGE UP
HBA1C BLD-MCNC: 8.7 % — HIGH (ref 4–5.6)
HCT VFR BLD CALC: 41.3 % — SIGNIFICANT CHANGE UP (ref 34.5–45)
HGB BLD-MCNC: 13.2 G/DL — SIGNIFICANT CHANGE UP (ref 11.5–15.5)
IMM GRANULOCYTES NFR BLD AUTO: 0.5 % — SIGNIFICANT CHANGE UP (ref 0–1.5)
INR BLD: 0.98 — SIGNIFICANT CHANGE UP (ref 0.88–1.16)
LYMPHOCYTES # BLD AUTO: 1.41 K/UL — SIGNIFICANT CHANGE UP (ref 1–3.3)
LYMPHOCYTES # BLD AUTO: 12.7 % — LOW (ref 13–44)
MAGNESIUM SERPL-MCNC: 2 MG/DL — SIGNIFICANT CHANGE UP (ref 1.6–2.6)
MCHC RBC-ENTMCNC: 27.4 PG — SIGNIFICANT CHANGE UP (ref 27–34)
MCHC RBC-ENTMCNC: 32 % — SIGNIFICANT CHANGE UP (ref 32–36)
MCV RBC AUTO: 85.9 FL — SIGNIFICANT CHANGE UP (ref 80–100)
MONOCYTES # BLD AUTO: 0.55 K/UL — SIGNIFICANT CHANGE UP (ref 0–0.9)
MONOCYTES NFR BLD AUTO: 5 % — SIGNIFICANT CHANGE UP (ref 2–14)
NEUTROPHILS # BLD AUTO: 9.03 K/UL — HIGH (ref 1.8–7.4)
NEUTROPHILS NFR BLD AUTO: 81.2 % — HIGH (ref 43–77)
NRBC # FLD: 0 K/UL — SIGNIFICANT CHANGE UP (ref 0–0)
PHOSPHATE SERPL-MCNC: 3.4 MG/DL — SIGNIFICANT CHANGE UP (ref 2.5–4.5)
PLATELET # BLD AUTO: 170 K/UL — SIGNIFICANT CHANGE UP (ref 150–400)
PMV BLD: 11.5 FL — SIGNIFICANT CHANGE UP (ref 7–13)
POTASSIUM SERPL-MCNC: 4.3 MMOL/L — SIGNIFICANT CHANGE UP (ref 3.5–5.3)
POTASSIUM SERPL-SCNC: 4.3 MMOL/L — SIGNIFICANT CHANGE UP (ref 3.5–5.3)
PROTHROM AB SERPL-ACNC: 11.3 SEC — SIGNIFICANT CHANGE UP (ref 10.6–13.6)
RBC # BLD: 4.81 M/UL — SIGNIFICANT CHANGE UP (ref 3.8–5.2)
RBC # FLD: 13.7 % — SIGNIFICANT CHANGE UP (ref 10.3–14.5)
SODIUM SERPL-SCNC: 133 MMOL/L — LOW (ref 135–145)
SPECIMEN SOURCE: SIGNIFICANT CHANGE UP
WBC # BLD: 11.11 K/UL — HIGH (ref 3.8–10.5)
WBC # FLD AUTO: 11.11 K/UL — HIGH (ref 3.8–10.5)

## 2020-08-11 PROCEDURE — 99233 SBSQ HOSP IP/OBS HIGH 50: CPT

## 2020-08-11 RX ORDER — CHLORHEXIDINE GLUCONATE 213 G/1000ML
1 SOLUTION TOPICAL DAILY
Refills: 0 | Status: DISCONTINUED | OUTPATIENT
Start: 2020-08-11 | End: 2020-08-19

## 2020-08-11 RX ORDER — PIPERACILLIN AND TAZOBACTAM 4; .5 G/20ML; G/20ML
3.38 INJECTION, POWDER, LYOPHILIZED, FOR SOLUTION INTRAVENOUS EVERY 8 HOURS
Refills: 0 | Status: DISCONTINUED | OUTPATIENT
Start: 2020-08-11 | End: 2020-08-14

## 2020-08-11 RX ORDER — TOBRAMYCIN SULFATE 40 MG/ML
300 VIAL (ML) INJECTION EVERY 12 HOURS
Refills: 0 | Status: DISCONTINUED | OUTPATIENT
Start: 2020-08-11 | End: 2020-08-19

## 2020-08-11 RX ORDER — BUDESONIDE AND FORMOTEROL FUMARATE DIHYDRATE 160; 4.5 UG/1; UG/1
2 AEROSOL RESPIRATORY (INHALATION)
Qty: 0 | Refills: 0 | DISCHARGE

## 2020-08-11 RX ORDER — INSULIN LISPRO 100/ML
VIAL (ML) SUBCUTANEOUS
Refills: 0 | Status: DISCONTINUED | OUTPATIENT
Start: 2020-08-11 | End: 2020-08-19

## 2020-08-11 RX ORDER — INSULIN GLARGINE 100 [IU]/ML
14 INJECTION, SOLUTION SUBCUTANEOUS AT BEDTIME
Refills: 0 | Status: DISCONTINUED | OUTPATIENT
Start: 2020-08-11 | End: 2020-08-17

## 2020-08-11 RX ORDER — SERTRALINE 25 MG/1
1 TABLET, FILM COATED ORAL
Qty: 0 | Refills: 0 | DISCHARGE

## 2020-08-11 RX ORDER — INSULIN ASPART 100 [IU]/ML
5 INJECTION, SOLUTION SUBCUTANEOUS
Qty: 0 | Refills: 0 | DISCHARGE

## 2020-08-11 RX ORDER — ACETAMINOPHEN 500 MG
650 TABLET ORAL ONCE
Refills: 0 | Status: COMPLETED | OUTPATIENT
Start: 2020-08-11 | End: 2020-08-11

## 2020-08-11 RX ORDER — PIPERACILLIN AND TAZOBACTAM 4; .5 G/20ML; G/20ML
3.38 INJECTION, POWDER, LYOPHILIZED, FOR SOLUTION INTRAVENOUS ONCE
Refills: 0 | Status: COMPLETED | OUTPATIENT
Start: 2020-08-11 | End: 2020-08-11

## 2020-08-11 RX ORDER — FERROUS SULFATE 325(65) MG
1 TABLET ORAL
Qty: 0 | Refills: 0 | DISCHARGE

## 2020-08-11 RX ORDER — ALBUTEROL 90 UG/1
2 AEROSOL, METERED ORAL EVERY 6 HOURS
Refills: 0 | Status: DISCONTINUED | OUTPATIENT
Start: 2020-08-11 | End: 2020-08-13

## 2020-08-11 RX ORDER — INSULIN LISPRO 100/ML
VIAL (ML) SUBCUTANEOUS AT BEDTIME
Refills: 0 | Status: DISCONTINUED | OUTPATIENT
Start: 2020-08-11 | End: 2020-08-19

## 2020-08-11 RX ORDER — ALBUTEROL 90 UG/1
2 AEROSOL, METERED ORAL
Qty: 0 | Refills: 0 | DISCHARGE

## 2020-08-11 RX ORDER — IPRATROPIUM/ALBUTEROL SULFATE 18-103MCG
3 AEROSOL WITH ADAPTER (GRAM) INHALATION EVERY 4 HOURS
Refills: 0 | Status: DISCONTINUED | OUTPATIENT
Start: 2020-08-11 | End: 2020-08-13

## 2020-08-11 RX ORDER — INSULIN LISPRO 100/ML
5 VIAL (ML) SUBCUTANEOUS
Refills: 0 | Status: DISCONTINUED | OUTPATIENT
Start: 2020-08-11 | End: 2020-08-13

## 2020-08-11 RX ORDER — BUDESONIDE AND FORMOTEROL FUMARATE DIHYDRATE 160; 4.5 UG/1; UG/1
2 AEROSOL RESPIRATORY (INHALATION)
Refills: 0 | Status: DISCONTINUED | OUTPATIENT
Start: 2020-08-11 | End: 2020-08-19

## 2020-08-11 RX ADMIN — Medication 40 MILLIGRAM(S): at 05:14

## 2020-08-11 RX ADMIN — INSULIN GLARGINE 14 UNIT(S): 100 INJECTION, SOLUTION SUBCUTANEOUS at 21:24

## 2020-08-11 RX ADMIN — PIPERACILLIN AND TAZOBACTAM 200 GRAM(S): 4; .5 INJECTION, POWDER, LYOPHILIZED, FOR SOLUTION INTRAVENOUS at 16:19

## 2020-08-11 RX ADMIN — ENOXAPARIN SODIUM 40 MILLIGRAM(S): 100 INJECTION SUBCUTANEOUS at 12:56

## 2020-08-11 RX ADMIN — Medication 3 MILLILITER(S): at 08:55

## 2020-08-11 RX ADMIN — BUDESONIDE AND FORMOTEROL FUMARATE DIHYDRATE 2 PUFF(S): 160; 4.5 AEROSOL RESPIRATORY (INHALATION) at 21:31

## 2020-08-11 RX ADMIN — Medication 6: at 17:01

## 2020-08-11 RX ADMIN — CHLORHEXIDINE GLUCONATE 1 APPLICATION(S): 213 SOLUTION TOPICAL at 21:19

## 2020-08-11 RX ADMIN — Medication 3 MILLILITER(S): at 16:26

## 2020-08-11 RX ADMIN — PIPERACILLIN AND TAZOBACTAM 25 GRAM(S): 4; .5 INJECTION, POWDER, LYOPHILIZED, FOR SOLUTION INTRAVENOUS at 23:11

## 2020-08-11 RX ADMIN — Medication 300 MILLIGRAM(S): at 21:25

## 2020-08-11 RX ADMIN — Medication 3: at 09:09

## 2020-08-11 RX ADMIN — Medication 3 MILLILITER(S): at 03:27

## 2020-08-11 RX ADMIN — Medication 40 MILLIGRAM(S): at 15:51

## 2020-08-11 RX ADMIN — Medication 5 UNIT(S): at 17:01

## 2020-08-11 RX ADMIN — Medication 500 MILLIGRAM(S): at 05:14

## 2020-08-11 RX ADMIN — Medication 6: at 13:50

## 2020-08-11 RX ADMIN — Medication 3 MILLILITER(S): at 21:15

## 2020-08-11 NOTE — PROGRESS NOTE ADULT - ASSESSMENT
66 YO Leandro speaking Female with PMHx of Diffuse Cystitic Bronchiectasis (Pseudomonas and Stenotrophmonas on previous cx) on intermittent on Prednisone, Primary Ciliary Dyskinesia, Allergic Bronchopulmonary Aspergillosis, HTN, DM2 A1C 8.7 (8/2020) p/w worsening productive cough, SOB, CP and intermittent fever over last 2 weeks. CXR unchanged with BL hazy opacities. Duonebs and Solumedrol given and admitted to Medicine. During admission, chest PT needed with NC trended up to 5L and transferred to RCU on 8/11. 66 YO Leandro speaking Female with PMHx of Diffuse Cystitic Bronchiectasis (Pseudomonas and Stenotrophmonas on previous cx) on intermittent on Prednisone, Primary Ciliary Dyskinesia, Allergic Bronchopulmonary Aspergillosis, HTN, DM2 A1C 8.7 (8/2020) p/w worsening productive cough, SOB, CP and intermittent fever over last 2 weeks. CXR unchanged with BL hazy opacities. Duonebs and Solumedrol given and admitted to Medicine. During admission, chest PT needed with NC trended up to 5L and transferred to RCU on 8/11.     # Bronchiectasis Exacerbation r/o Infection   - CXR with unchanged hazy opacities  - COVID negative.    - Prednisone 40mg PO QD changed to Solumedrol 40mg IV QD for now.   - Duonebs Q4H with Proventil Q6H PRN. Symbicort BID.   - DC Cipro. Started Zosyn and Jerry 8/11 for now as patient noted with  Pseudomonas in Cx.   - Blood culture sent. Sputum culture pending. Check COVID AB   - Monitor O2 and wean off as tolerated.   - Chest PT Q6H and Suction PRN.     # HTN  - Holding home Norvasc 5mg PO QD.   - Monitor BP and continue diet modifications for now.     # IDDM2 A1C 8.7 (8/2020)  - Continue on Lantus 14 U QHS and Lispro 5 U AC with moderate ISS ACHS.   - Monitor FS and adjust as needed while on steroids.     # DVT PPX with Lovenox  # DISPO - RCU. PT consulted.

## 2020-08-11 NOTE — PROGRESS NOTE ADULT - SUBJECTIVE AND OBJECTIVE BOX
PROGRESS NOTE:     Patient is a 65y old  Female who presents with a chief complaint of copd exac (11 Aug 2020 10:24)      SUBJECTIVE / OVERNIGHT EVENTS: Shortness of breath w/ productive cough.     ADDITIONAL REVIEW OF SYSTEMS:    MEDICATIONS  (STANDING):  albuterol/ipratropium for Nebulization 3 milliLiter(s) Nebulizer every 4 hours  dextrose 5%. 1000 milliLiter(s) (50 mL/Hr) IV Continuous <Continuous>  dextrose 50% Injectable 12.5 Gram(s) IV Push once  dextrose 50% Injectable 25 Gram(s) IV Push once  dextrose 50% Injectable 25 Gram(s) IV Push once  enoxaparin Injectable 40 milliGRAM(s) SubCutaneous daily  insulin glargine Injectable (LANTUS) 10 Unit(s) SubCutaneous at bedtime  insulin lispro (HumaLOG) corrective regimen sliding scale   SubCutaneous three times a day before meals  methylPREDNISolone sodium succinate Injectable 40 milliGRAM(s) IV Push every 24 hours  piperacillin/tazobactam IVPB. 3.375 Gram(s) IV Intermittent once  piperacillin/tazobactam IVPB.. 3.375 Gram(s) IV Intermittent every 8 hours  tobramycin for Nebulization 300 milliGRAM(s) Inhalation every 12 hours    MEDICATIONS  (PRN):  benzonatate 100 milliGRAM(s) Oral every 8 hours PRN Cough  dextrose 40% Gel 15 Gram(s) Oral once PRN Blood Glucose LESS THAN 70 milliGRAM(s)/deciliter  glucagon  Injectable 1 milliGRAM(s) IntraMuscular once PRN Glucose LESS THAN 70 milligrams/deciliter      CAPILLARY BLOOD GLUCOSE      POCT Blood Glucose.: 300 mg/dL (11 Aug 2020 13:35)  POCT Blood Glucose.: 276 mg/dL (11 Aug 2020 07:53)  POCT Blood Glucose.: 252 mg/dL (10 Aug 2020 21:26)  POCT Blood Glucose.: 307 mg/dL (10 Aug 2020 19:37)    I&O's Summary      PHYSICAL EXAM:  Vital Signs Last 24 Hrs  T(C): 36.4 (11 Aug 2020 12:46), Max: 37.2 (10 Aug 2020 16:15)  T(F): 97.6 (11 Aug 2020 12:46), Max: 99 (10 Aug 2020 16:15)  HR: 82 (11 Aug 2020 12:46) (72 - 88)  BP: 128/74 (11 Aug 2020 12:46) (124/70 - 136/64)  BP(mean): --  RR: 18 (11 Aug 2020 12:46) (17 - 20)  SpO2: 93% (11 Aug 2020 12:46) (93% - 100%)    CONSTITUTIONAL: Appears uncomfortable   RESPIRATORY: Bilateral crackles   CARDIOVASCULAR: Regular rate and rhythm, normal S1 and S2, no murmur/rub/gallop; No lower extremity edema; Peripheral pulses are 2+ bilaterally  ABDOMEN: Nontender to palpation, normoactive bowel sounds, no rebound/guarding; No hepatosplenomegaly  MUSCLOSKELETAL: no clubbing or cyanosis of digits; no joint swelling or tenderness to palpation  PSYCH: A+O to person, place, and time; affect appropriate  NEURO: Non-focal     LABS:                        13.2   11.11 )-----------( 170      ( 11 Aug 2020 06:30 )             41.3     08-11    133<L>  |  95<L>  |  20  ----------------------------<  372<H>  4.3   |  29  |  0.38<L>    Ca    9.4      11 Aug 2020 10:41  Phos  3.4     08-11  Mg     2.0     08-11    TPro  7.2  /  Alb  3.6  /  TBili  0.3  /  DBili  x   /  AST  39<H>  /  ALT  17  /  AlkPhos  106  08-10    PT/INR - ( 11 Aug 2020 06:30 )   PT: 11.3 SEC;   INR: 0.98          PTT - ( 11 Aug 2020 06:30 )  PTT:28.0 SEC            RADIOLOGY & ADDITIONAL TESTS:  Results Reviewed:   Imaging Personally Reviewed:  Electrocardiogram Personally Reviewed:    COORDINATION OF CARE:  Care Discussed with Consultants/Other Providers [Y/N]:  Prior or Outpatient Records Reviewed [Y/N]:

## 2020-08-11 NOTE — PROGRESS NOTE ADULT - SUBJECTIVE AND OBJECTIVE BOX
RCU ACCEPTANCE NOTE    64 YO Leandro speaking Female with PMHx of Diffuse Cystitic Bronchiectasis (Pseudomonas and Stenotrophmonas on previous cx) on intermittent on Prednisone, Primary Ciliary Dyskinesia, Allergic Bronchopulmonary Aspergillosis, HTN, DM2 A1C 8.7 (8/2020) p/w worsening productive cough, SOB, CP and intermittent fever over last 2 weeks. CXR unchanged with BL hazy opacities. Duonebs and Solumedrol given and admitted to Medicine. During admission, chest PT needed with NC trended up to 5L and transferred to RCU on 8/11.     SUBJECTIVE:    OBJECTIVE:  ICU Vital Signs Last 24 Hrs  T(C): 36.4 (11 Aug 2020 01:27), Max: 37.3 (10 Aug 2020 13:29)  T(F): 97.6 (11 Aug 2020 01:27), Max: 99.2 (10 Aug 2020 13:29)  HR: 72 (11 Aug 2020 08:55) (72 - 91)  BP: 128/61 (11 Aug 2020 01:27) (124/70 - 167/79)  BP(mean): --  ABP: --  ABP(mean): --  RR: 18 (11 Aug 2020 01:27) (18 - 30)  SpO2: 97% (11 Aug 2020 08:55) (93% - 100%)    CAPILLARY BLOOD GLUCOSE  POCT Blood Glucose.: 276 mg/dL (11 Aug 2020 07:53)    PHYSICAL EXAM:  General:   HEENT:   Lymph Nodes:  Neck:   Respiratory:   Cardiovascular:   Abdomen:   Extremities:   Skin:   Neurological:  Psychiatry:    HOSPITAL MEDICATIONS:  MEDICATIONS  (STANDING):  albuterol/ipratropium for Nebulization 3 milliLiter(s) Nebulizer every 6 hours  ciprofloxacin     Tablet 500 milliGRAM(s) Oral every 12 hours  dextrose 5%. 1000 milliLiter(s) (50 mL/Hr) IV Continuous <Continuous>  dextrose 50% Injectable 12.5 Gram(s) IV Push once  dextrose 50% Injectable 25 Gram(s) IV Push once  dextrose 50% Injectable 25 Gram(s) IV Push once  enoxaparin Injectable 40 milliGRAM(s) SubCutaneous daily  insulin glargine Injectable (LANTUS) 10 Unit(s) SubCutaneous at bedtime  insulin lispro (HumaLOG) corrective regimen sliding scale   SubCutaneous three times a day before meals  insulin lispro (HumaLOG) corrective regimen sliding scale   SubCutaneous at bedtime  predniSONE   Tablet 40 milliGRAM(s) Oral daily    MEDICATIONS  (PRN):  benzonatate 100 milliGRAM(s) Oral every 8 hours PRN Cough  dextrose 40% Gel 15 Gram(s) Oral once PRN Blood Glucose LESS THAN 70 milliGRAM(s)/deciliter  glucagon  Injectable 1 milliGRAM(s) IntraMuscular once PRN Glucose LESS THAN 70 milligrams/deciliter    LABS:                        13.2   11.11 )-----------( 170      ( 11 Aug 2020 06:30 )             41.3     08-10    131<L>  |  94<L>  |  16  ----------------------------<  193<H>  5.3   |  27  |  0.34<L>    Ca    9.3      10 Aug 2020 15:00    TPro  7.2  /  Alb  3.6  /  TBili  0.3  /  DBili  x   /  AST  39<H>  /  ALT  17  /  AlkPhos  106  08-10    PT/INR - ( 11 Aug 2020 06:30 )   PT: 11.3 SEC;   INR: 0.98       PTT - ( 11 Aug 2020 06:30 )  PTT:28.0 SEC    Venous Blood Gas:  08-10 @ 15:00  7.38/51/78/28/94.5  VBG Lactate: 2.2    MICROBIOLOGY:     RADIOLOGY:  [ ] Reviewed and interpreted by me    PULMONARY FUNCTION TESTS:    EKG: RCU ACCEPTANCE NOTE    64 YO Leandro speaking Female with PMHx of Diffuse Cystitic Bronchiectasis (Pseudomonas and Stenotrophmonas on previous cx) on intermittent on Prednisone, Primary Ciliary Dyskinesia, Allergic Bronchopulmonary Aspergillosis, HTN, DM2 A1C 8.7 (8/2020) p/w worsening productive cough, SOB, CP and intermittent fever over last 2 weeks. CXR unchanged with BL hazy opacities. Duonebs and Solumedrol given and admitted to Medicine. During admission, chest PT needed with NC trended up to 5L and transferred to RCU on 8/11.     SUBJECTIVE: Seen by bedside on transfer. Limited English, however able to understand. Notes SOB and Cough. Denies CP.     OBJECTIVE:  ICU Vital Signs Last 24 Hrs  T(C): 36.4 (11 Aug 2020 01:27), Max: 37.3 (10 Aug 2020 13:29)  T(F): 97.6 (11 Aug 2020 01:27), Max: 99.2 (10 Aug 2020 13:29)  HR: 72 (11 Aug 2020 08:55) (72 - 91)  BP: 128/61 (11 Aug 2020 01:27) (124/70 - 167/79)  BP(mean): --  ABP: --  ABP(mean): --  RR: 18 (11 Aug 2020 01:27) (18 - 30)  SpO2: 97% (11 Aug 2020 08:55) (93% - 100%)    CAPILLARY BLOOD GLUCOSE  POCT Blood Glucose.: 276 mg/dL (11 Aug 2020 07:53)    PHYSICAL EXAM:  General: NAD   Cards: S1/S2, no murmurs   Pulm:  (+) wheezing and rhonchi   Abdomen: Soft, NTND. BS (+)   Extremities: No pedal edema. CARTINA of BL upper and lower extremities.  Neurology: Awake and alert with no focal neurological deficits     HOSPITAL MEDICATIONS:  MEDICATIONS  (STANDING):  albuterol/ipratropium for Nebulization 3 milliLiter(s) Nebulizer every 6 hours  ciprofloxacin     Tablet 500 milliGRAM(s) Oral every 12 hours  dextrose 5%. 1000 milliLiter(s) (50 mL/Hr) IV Continuous <Continuous>  dextrose 50% Injectable 12.5 Gram(s) IV Push once  dextrose 50% Injectable 25 Gram(s) IV Push once  dextrose 50% Injectable 25 Gram(s) IV Push once  enoxaparin Injectable 40 milliGRAM(s) SubCutaneous daily  insulin glargine Injectable (LANTUS) 10 Unit(s) SubCutaneous at bedtime  insulin lispro (HumaLOG) corrective regimen sliding scale   SubCutaneous three times a day before meals  insulin lispro (HumaLOG) corrective regimen sliding scale   SubCutaneous at bedtime  predniSONE   Tablet 40 milliGRAM(s) Oral daily    MEDICATIONS  (PRN):  benzonatate 100 milliGRAM(s) Oral every 8 hours PRN Cough  dextrose 40% Gel 15 Gram(s) Oral once PRN Blood Glucose LESS THAN 70 milliGRAM(s)/deciliter  glucagon  Injectable 1 milliGRAM(s) IntraMuscular once PRN Glucose LESS THAN 70 milligrams/deciliter    LABS:                        13.2   11.11 )-----------( 170      ( 11 Aug 2020 06:30 )             41.3     08-10    131<L>  |  94<L>  |  16  ----------------------------<  193<H>  5.3   |  27  |  0.34<L>    Ca    9.3      10 Aug 2020 15:00    TPro  7.2  /  Alb  3.6  /  TBili  0.3  /  DBili  x   /  AST  39<H>  /  ALT  17  /  AlkPhos  106  08-10    PT/INR - ( 11 Aug 2020 06:30 )   PT: 11.3 SEC;   INR: 0.98       PTT - ( 11 Aug 2020 06:30 )  PTT:28.0 SEC    Venous Blood Gas:  08-10 @ 15:00  7.38/51/78/28/94.5  VBG Lactate: 2.2    MICROBIOLOGY:     RADIOLOGY:  [ ] Reviewed and interpreted by me    PULMONARY FUNCTION TESTS:    EKG: RCU ACCEPTANCE NOTE    66 YO Leandro speaking Female with PMHx of Diffuse Cystitic Bronchiectasis (no home O2 and Pseudomonas and Stenotrophmonas on previous cx) on intermittent on Prednisone, Primary Ciliary Dyskinesia, Allergic Bronchopulmonary Aspergillosis, HTN, DM2 A1C 8.7 (8/2020) p/w worsening productive cough, SOB, CP and intermittent fever over last 2 weeks. Attempting to avoid coming to the hospital, patient sought medical attention outpatient. Culture taken and noted with Pseudomonas. Inhaled Tobramycin, Medrol Pack and Symbicort replaced with Perforomist/ Budesonide prescribed outpatient, however cough and symptoms worsened of which prompted her to come to the ED. In the ED, CXR unchanged with BL hazy opacities. Duonebs and Solumedrol given and admitted to Medicine. During admission, chest PT needed with NC trended up to 5L and transferred to RCU on 8/11.    SUBJECTIVE: Seen by bedside on transfer. Limited English, however able to understand. Notes SOB and Cough. Denies CP.     OBJECTIVE:  ICU Vital Signs Last 24 Hrs  T(C): 36.4 (11 Aug 2020 01:27), Max: 37.3 (10 Aug 2020 13:29)  T(F): 97.6 (11 Aug 2020 01:27), Max: 99.2 (10 Aug 2020 13:29)  HR: 72 (11 Aug 2020 08:55) (72 - 91)  BP: 128/61 (11 Aug 2020 01:27) (124/70 - 167/79)  BP(mean): --  ABP: --  ABP(mean): --  RR: 18 (11 Aug 2020 01:27) (18 - 30)  SpO2: 97% (11 Aug 2020 08:55) (93% - 100%)    CAPILLARY BLOOD GLUCOSE  POCT Blood Glucose.: 276 mg/dL (11 Aug 2020 07:53)    PHYSICAL EXAM:  General: NAD   Cards: S1/S2, no murmurs   Pulm:  (+) wheezing and rhonchi   Abdomen: Soft, NTND. BS (+)   Extremities: No pedal edema. CATRINA of BL upper and lower extremities.  Neurology: Awake and alert with no focal neurological deficits     HOSPITAL MEDICATIONS:  MEDICATIONS  (STANDING):  albuterol/ipratropium for Nebulization 3 milliLiter(s) Nebulizer every 6 hours  ciprofloxacin     Tablet 500 milliGRAM(s) Oral every 12 hours  dextrose 5%. 1000 milliLiter(s) (50 mL/Hr) IV Continuous <Continuous>  dextrose 50% Injectable 12.5 Gram(s) IV Push once  dextrose 50% Injectable 25 Gram(s) IV Push once  dextrose 50% Injectable 25 Gram(s) IV Push once  enoxaparin Injectable 40 milliGRAM(s) SubCutaneous daily  insulin glargine Injectable (LANTUS) 10 Unit(s) SubCutaneous at bedtime  insulin lispro (HumaLOG) corrective regimen sliding scale   SubCutaneous three times a day before meals  insulin lispro (HumaLOG) corrective regimen sliding scale   SubCutaneous at bedtime  predniSONE   Tablet 40 milliGRAM(s) Oral daily    MEDICATIONS  (PRN):  benzonatate 100 milliGRAM(s) Oral every 8 hours PRN Cough  dextrose 40% Gel 15 Gram(s) Oral once PRN Blood Glucose LESS THAN 70 milliGRAM(s)/deciliter  glucagon  Injectable 1 milliGRAM(s) IntraMuscular once PRN Glucose LESS THAN 70 milligrams/deciliter    LABS:                        13.2   11.11 )-----------( 170      ( 11 Aug 2020 06:30 )             41.3     08-10    131<L>  |  94<L>  |  16  ----------------------------<  193<H>  5.3   |  27  |  0.34<L>    Ca    9.3      10 Aug 2020 15:00    TPro  7.2  /  Alb  3.6  /  TBili  0.3  /  DBili  x   /  AST  39<H>  /  ALT  17  /  AlkPhos  106  08-10    PT/INR - ( 11 Aug 2020 06:30 )   PT: 11.3 SEC;   INR: 0.98       PTT - ( 11 Aug 2020 06:30 )  PTT:28.0 SEC    Venous Blood Gas:  08-10 @ 15:00  7.38/51/78/28/94.5  VBG Lactate: 2.2    MICROBIOLOGY:     RADIOLOGY:  [ ] Reviewed and interpreted by me    PULMONARY FUNCTION TESTS:    EKG:

## 2020-08-11 NOTE — PHARMACOTHERAPY INTERVENTION NOTE - COMMENTS
Medication history is complete. Medication list updated in Outpatient Medication Record ([facility]). Please call spectra [phone] if you have any questions.

## 2020-08-11 NOTE — PROGRESS NOTE ADULT - PROBLEM SELECTOR PLAN 2
-d/t exacerbation of bronchiectasis   -continue supplemental O2 via nc   -management as above   -monitor O2 sats

## 2020-08-11 NOTE — PROGRESS NOTE ADULT - PROBLEM SELECTOR PLAN 3
-Hgb A1c 8.7, steroids likely to worsen hyperglycemia   -continue insulin sliding scale   -increase Lantus to 14 units qhs   -restart Humalog 5 units TID w/ meals   -monitor fingersticks

## 2020-08-11 NOTE — PROGRESS NOTE ADULT - PROBLEM SELECTOR PLAN 1
-d/t possible gram negative pneumonia   -agree w/ switching antibiotics to Zosyn   -inhaled Tobramycin   -bela ATC   -Solumedrol 40mg IV daily   -check sputum culture   -transfer to [facility]

## 2020-08-12 ENCOUNTER — TRANSCRIPTION ENCOUNTER (OUTPATIENT)
Age: 65
End: 2020-08-12

## 2020-08-12 LAB
ALBUMIN SERPL ELPH-MCNC: 3.5 G/DL — SIGNIFICANT CHANGE UP (ref 3.3–5)
ALP SERPL-CCNC: 117 U/L — SIGNIFICANT CHANGE UP (ref 40–120)
ALT FLD-CCNC: 20 U/L — SIGNIFICANT CHANGE UP (ref 4–33)
ANION GAP SERPL CALC-SCNC: 10 MMO/L — SIGNIFICANT CHANGE UP (ref 7–14)
AST SERPL-CCNC: 19 U/L — SIGNIFICANT CHANGE UP (ref 4–32)
BASOPHILS # BLD AUTO: 0.03 K/UL — SIGNIFICANT CHANGE UP (ref 0–0.2)
BASOPHILS NFR BLD AUTO: 0.3 % — SIGNIFICANT CHANGE UP (ref 0–2)
BILIRUB SERPL-MCNC: 0.3 MG/DL — SIGNIFICANT CHANGE UP (ref 0.2–1.2)
BUN SERPL-MCNC: 24 MG/DL — HIGH (ref 7–23)
CALCIUM SERPL-MCNC: 9.1 MG/DL — SIGNIFICANT CHANGE UP (ref 8.4–10.5)
CHLORIDE SERPL-SCNC: 100 MMOL/L — SIGNIFICANT CHANGE UP (ref 98–107)
CO2 SERPL-SCNC: 28 MMOL/L — SIGNIFICANT CHANGE UP (ref 22–31)
CREAT SERPL-MCNC: 0.49 MG/DL — LOW (ref 0.5–1.3)
EOSINOPHIL # BLD AUTO: 0.02 K/UL — SIGNIFICANT CHANGE UP (ref 0–0.5)
EOSINOPHIL NFR BLD AUTO: 0.2 % — SIGNIFICANT CHANGE UP (ref 0–6)
GLUCOSE BLDC GLUCOMTR-MCNC: 229 MG/DL — HIGH (ref 70–99)
GLUCOSE BLDC GLUCOMTR-MCNC: 239 MG/DL — HIGH (ref 70–99)
GLUCOSE BLDC GLUCOMTR-MCNC: 243 MG/DL — HIGH (ref 70–99)
GLUCOSE BLDC GLUCOMTR-MCNC: 90 MG/DL — SIGNIFICANT CHANGE UP (ref 70–99)
GLUCOSE SERPL-MCNC: 300 MG/DL — HIGH (ref 70–99)
HCT VFR BLD CALC: 41.7 % — SIGNIFICANT CHANGE UP (ref 34.5–45)
HGB BLD-MCNC: 12.8 G/DL — SIGNIFICANT CHANGE UP (ref 11.5–15.5)
IMM GRANULOCYTES NFR BLD AUTO: 0.5 % — SIGNIFICANT CHANGE UP (ref 0–1.5)
LYMPHOCYTES # BLD AUTO: 1.12 K/UL — SIGNIFICANT CHANGE UP (ref 1–3.3)
LYMPHOCYTES # BLD AUTO: 10.7 % — LOW (ref 13–44)
MAGNESIUM SERPL-MCNC: 2.2 MG/DL — SIGNIFICANT CHANGE UP (ref 1.6–2.6)
MCHC RBC-ENTMCNC: 27.2 PG — SIGNIFICANT CHANGE UP (ref 27–34)
MCHC RBC-ENTMCNC: 30.7 % — LOW (ref 32–36)
MCV RBC AUTO: 88.5 FL — SIGNIFICANT CHANGE UP (ref 80–100)
MONOCYTES # BLD AUTO: 0.73 K/UL — SIGNIFICANT CHANGE UP (ref 0–0.9)
MONOCYTES NFR BLD AUTO: 7 % — SIGNIFICANT CHANGE UP (ref 2–14)
NEUTROPHILS # BLD AUTO: 8.52 K/UL — HIGH (ref 1.8–7.4)
NEUTROPHILS NFR BLD AUTO: 81.3 % — HIGH (ref 43–77)
NRBC # FLD: 0 K/UL — SIGNIFICANT CHANGE UP (ref 0–0)
PHOSPHATE SERPL-MCNC: 3.2 MG/DL — SIGNIFICANT CHANGE UP (ref 2.5–4.5)
PLATELET # BLD AUTO: 164 K/UL — SIGNIFICANT CHANGE UP (ref 150–400)
PMV BLD: 11.2 FL — SIGNIFICANT CHANGE UP (ref 7–13)
POTASSIUM SERPL-MCNC: 4.1 MMOL/L — SIGNIFICANT CHANGE UP (ref 3.5–5.3)
POTASSIUM SERPL-SCNC: 4.1 MMOL/L — SIGNIFICANT CHANGE UP (ref 3.5–5.3)
PROT SERPL-MCNC: 6.5 G/DL — SIGNIFICANT CHANGE UP (ref 6–8.3)
RBC # BLD: 4.71 M/UL — SIGNIFICANT CHANGE UP (ref 3.8–5.2)
RBC # FLD: 13.6 % — SIGNIFICANT CHANGE UP (ref 10.3–14.5)
SARS-COV-2 IGG SERPL QL IA: NEGATIVE — SIGNIFICANT CHANGE UP
SARS-COV-2 IGM SERPL IA-ACNC: <0.1 INDEX — SIGNIFICANT CHANGE UP
SODIUM SERPL-SCNC: 138 MMOL/L — SIGNIFICANT CHANGE UP (ref 135–145)
WBC # BLD: 10.47 K/UL — SIGNIFICANT CHANGE UP (ref 3.8–10.5)
WBC # FLD AUTO: 10.47 K/UL — SIGNIFICANT CHANGE UP (ref 3.8–10.5)

## 2020-08-12 PROCEDURE — 99233 SBSQ HOSP IP/OBS HIGH 50: CPT | Mod: GC

## 2020-08-12 RX ORDER — SIMETHICONE 80 MG/1
80 TABLET, CHEWABLE ORAL EVERY 12 HOURS
Refills: 0 | Status: DISCONTINUED | OUTPATIENT
Start: 2020-08-12 | End: 2020-08-19

## 2020-08-12 RX ADMIN — Medication 300 MILLIGRAM(S): at 08:52

## 2020-08-12 RX ADMIN — BUDESONIDE AND FORMOTEROL FUMARATE DIHYDRATE 2 PUFF(S): 160; 4.5 AEROSOL RESPIRATORY (INHALATION) at 08:25

## 2020-08-12 RX ADMIN — Medication 3 MILLILITER(S): at 21:21

## 2020-08-12 RX ADMIN — Medication 3 MILLILITER(S): at 12:45

## 2020-08-12 RX ADMIN — ENOXAPARIN SODIUM 40 MILLIGRAM(S): 100 INJECTION SUBCUTANEOUS at 11:15

## 2020-08-12 RX ADMIN — Medication 4: at 07:44

## 2020-08-12 RX ADMIN — PIPERACILLIN AND TAZOBACTAM 25 GRAM(S): 4; .5 INJECTION, POWDER, LYOPHILIZED, FOR SOLUTION INTRAVENOUS at 23:54

## 2020-08-12 RX ADMIN — Medication 4: at 17:36

## 2020-08-12 RX ADMIN — Medication 3 MILLILITER(S): at 01:02

## 2020-08-12 RX ADMIN — PIPERACILLIN AND TAZOBACTAM 25 GRAM(S): 4; .5 INJECTION, POWDER, LYOPHILIZED, FOR SOLUTION INTRAVENOUS at 17:38

## 2020-08-12 RX ADMIN — Medication 40 MILLIGRAM(S): at 13:28

## 2020-08-12 RX ADMIN — CHLORHEXIDINE GLUCONATE 1 APPLICATION(S): 213 SOLUTION TOPICAL at 11:15

## 2020-08-12 RX ADMIN — BUDESONIDE AND FORMOTEROL FUMARATE DIHYDRATE 2 PUFF(S): 160; 4.5 AEROSOL RESPIRATORY (INHALATION) at 21:42

## 2020-08-12 RX ADMIN — Medication 300 MILLIGRAM(S): at 21:22

## 2020-08-12 RX ADMIN — PIPERACILLIN AND TAZOBACTAM 25 GRAM(S): 4; .5 INJECTION, POWDER, LYOPHILIZED, FOR SOLUTION INTRAVENOUS at 09:01

## 2020-08-12 RX ADMIN — SIMETHICONE 80 MILLIGRAM(S): 80 TABLET, CHEWABLE ORAL at 13:29

## 2020-08-12 RX ADMIN — INSULIN GLARGINE 14 UNIT(S): 100 INJECTION, SOLUTION SUBCUTANEOUS at 21:28

## 2020-08-12 RX ADMIN — Medication 3 MILLILITER(S): at 08:25

## 2020-08-12 RX ADMIN — Medication 3 MILLILITER(S): at 04:04

## 2020-08-12 RX ADMIN — Medication 5 UNIT(S): at 11:58

## 2020-08-12 RX ADMIN — Medication 100 MILLIGRAM(S): at 07:44

## 2020-08-12 RX ADMIN — Medication 5 UNIT(S): at 17:37

## 2020-08-12 RX ADMIN — Medication 5 UNIT(S): at 07:45

## 2020-08-12 RX ADMIN — Medication 3 MILLILITER(S): at 16:19

## 2020-08-12 NOTE — DISCHARGE NOTE PROVIDER - PROVIDER TOKENS
FREE:[LAST:[[employer]],FIRST:[],PHONE:[(   )    -],FAX:[(   )    -],ADDRESS:[Pulmonology]] PROVIDER:[TOKEN:[[id number]]]

## 2020-08-12 NOTE — DISCHARGE NOTE PROVIDER - NSDCCPCAREPLAN_GEN_ALL_CORE_FT
PRINCIPAL DISCHARGE DIAGNOSIS  Diagnosis: Acute respiratory failure with hypoxia  Assessment and Plan of Treatment: Your oxygen levels are adequate with supplemental oxygen via nasal cannula.      SECONDARY DISCHARGE DIAGNOSES  Diagnosis: Bronchiectasis with acute exacerbation  Assessment and Plan of Treatment: You were treated with chest physical therapy, nebulizers, and steroids. Please follow-up with pulmonology within 1 week upon discharge.    Diagnosis: Recurrent pneumonia  Assessment and Plan of Treatment: Levaquin started in hospital. Monitor for worsening of disease, such as, increased cough/sputum, difficulty breathing, fever/chills, or changes in mental status. Follow-up with your pulmonologist as an outpatient for further medical care/recommendations.    Diagnosis: DM (diabetes mellitus)  Assessment and Plan of Treatment: Continue your medication regimen and a consistent carbohydrate diet (Meaning eating the same amount of carbohydrates at the same time each day). Monitor blood glucose levels throughout the day before meals and at bedtime. Record blood sugars and bring to outpatient providers appointment in order to be reviewed by your doctor for management modifications. If your sugars are more than 400 or less than 70 you should contact your PCP immediately. Monitor for signs/symptoms of low blood glucose, such as, dizziness, altered mental status, or cool/clammy skin. In addition, monitor for signs/symptoms of high blood glucose, such as, feeling hot, dry, fatigued, or with increased thirst/urination. Make regular podiatry appointments in order to have feet checked for wounds and uncontrolled toe nail growth to prevent infections, as well as, appointments with an ophthalmologist to monitor your vision.    Diagnosis: Hypertension, unspecified type  Assessment and Plan of Treatment: Continue blood pressure medication regimen as directed. Monitor for any visual changes, headaches or dizziness.  Monitor blood pressure regularly.  Follow up with your primary care provider for further management for high blood pressure.    Diagnosis: Anxiety  Assessment and Plan of Treatment: Continue Effexor. If you are ever in need of immediate psychiatric assistance you may reach out to the [facility] [phone]. PRINCIPAL DISCHARGE DIAGNOSIS  Diagnosis: Acute respiratory failure with hypoxia  Assessment and Plan of Treatment: Your were admitted with low oxygen second to Bronchiectasis Exacerbation from  You completed IV and oral antibiotics in house and you are required to continue on Tobramycin inhalation every other month. Current continue on Tobramycin twice a day until 9/7. You will be off Tobramycin from 9/8-10/5 and then restart again on 10/6. Dr. Young will monitor your Tobramycin antibiotic outpatient.   You were on steroids in house and you are required to be tapered off steroids over 42 days (6 weeks) as follows:   Prednisone 60mg by mouth once a day (8/18-8/24) then   Prednisone 50mg by mouth once a day (8/25-8/31) then   Prednisone 40mg by mouth once a day (9/1-9/7) then   Prednisone 30mg by mouth once a day (9/8-9/14) then   Prednisone 20mg by mouth once a day (9/15-9/21) then   Prednisone 10mg by mouth once a day (9/22-9/28) then done.   Please follow up with Dr. Young outpatient within 1-2 weeks from       SECONDARY DISCHARGE DIAGNOSES  Diagnosis: Hypertension, unspecified type  Assessment and Plan of Treatment: Continue blood pressure medication regimen as directed. Monitor for any visual changes, headaches or dizziness.  Monitor blood pressure regularly.  Follow up with your primary care provider for further management for high blood pressure.    Diagnosis: DM (diabetes mellitus)  Assessment and Plan of Treatment: Monitor your sugars closely while in steroids and follow up with your Endocrinologist in 1-2 weeks from  As per our records, you have an appointment scheduled for 9/1/2020.   Continue consistent carbohydrate diet.  Monitor blood glucose levels throughout the day before meals and at bedtime.  Record blood sugars and bring to outpatient providers appointment in order to be reviewed by your doctor for management modifications.  Be aware of diabetes management symptoms including feeling cool and clammy may be related to low glucose levels.  Feeling hot and dry may indicate high glucose levels.  If  you feel these symptoms, check your blood sugar.  Make regular podiatry appointments in order to have feet checked for wounds and toe nails cut by a doctor to prevent infections.    Diagnosis: Anxiety  Assessment and Plan of Treatment: Continue Effexor. Stop Zoloft. If you are ever in need of immediate psychiatric assistance you may reach out to the [facility] [phone]. PRINCIPAL DISCHARGE DIAGNOSIS  Diagnosis: Acute respiratory failure with hypoxia  Assessment and Plan of Treatment: Your were admitted with low oxygen second to Bronchiectasis Exacerbation from  You completed IV and oral antibiotics in house and you are required to continue on Tobramycin inhalation every other month. Current continue on Tobramycin twice a day until 9/7. You will be off Tobramycin from 9/8-10/5 and then restart again on 10/6. Dr. Young will monitor your Tobramycin antibiotic outpatient. O2 needed (1-2L) outpatient.  You were on steroids in house and you are required to be tapered off steroids over 42 days (6 weeks) as follows:   Prednisone 60mg by mouth once a day (8/18-8/24) then   Prednisone 50mg by mouth once a day (8/25-8/31) then   Prednisone 40mg by mouth once a day (9/1-9/7) then   Prednisone 30mg by mouth once a day (9/8-9/14) then   Prednisone 20mg by mouth once a day (9/15-9/21) then   Prednisone 10mg by mouth once a day (9/22-9/28) then done.   Your LFTs (liver function test) were noted to be elevated likely second to Sepsis. Please follow up with Dr. Young outpatient within 1-2 weeks from discharge for contined monitoring of Tobramycin, Steroids taper, O2 demand and liver function tests.      SECONDARY DISCHARGE DIAGNOSES  Diagnosis: Hypertension, unspecified type  Assessment and Plan of Treatment: Continue blood pressure medication regimen as directed. Monitor for any visual changes, headaches or dizziness.  Monitor blood pressure regularly.  Follow up with your primary care provider for further management for high blood pressure.    Diagnosis: DM (diabetes mellitus)  Assessment and Plan of Treatment: Monitor your sugars closely while in steroids and follow up with your Endocrinologist in 1-2 weeks from  As per our records, you have an appointment scheduled for 9/1/2020.   Continue consistent carbohydrate diet.  Monitor blood glucose levels throughout the day before meals and at bedtime.  Record blood sugars and bring to outpatient providers appointment in order to be reviewed by your doctor for management modifications.  Be aware of diabetes management symptoms including feeling cool and clammy may be related to low glucose levels.  Feeling hot and dry may indicate high glucose levels.  If  you feel these symptoms, check your blood sugar.  Make regular podiatry appointments in order to have feet checked for wounds and toe nails cut by a doctor to prevent infections.    Diagnosis: Anxiety  Assessment and Plan of Treatment: Continue Effexor. Stop Zoloft. If you are ever in need of immediate psychiatric assistance you may reach out to the [facility] [phone].

## 2020-08-12 NOTE — PROGRESS NOTE ADULT - SUBJECTIVE AND OBJECTIVE BOX
CHIEF COMPLAINT: Patient is a 65y old  Female who presents with a chief complaint of copd exac (11 Aug 2020 14:53)    SUBJECTIVE: No interval events overnight.     [ ] All other systems negative  [ ] Unable to assess ROS because ________    OBJECTIVE:  ICU Vital Signs Last 24 Hrs  T(C): 36.8 (12 Aug 2020 06:39), Max: 36.8 (11 Aug 2020 18:41)  T(F): 98.3 (12 Aug 2020 06:39), Max: 98.3 (12 Aug 2020 06:39)  HR: 77 (12 Aug 2020 08:27) (63 - 93)  BP: 138/74 (12 Aug 2020 06:39) (126/60 - 138/74)  BP(mean): --  ABP: --  ABP(mean): --  RR: 18 (12 Aug 2020 06:39) (17 - 18)  SpO2: 98% (12 Aug 2020 06:39) (93% - 99%)    CAPILLARY BLOOD GLUCOSE  POCT Blood Glucose.: 243 mg/dL (12 Aug 2020 07:38)    PHYSICAL EXAM:  General:   HEENT:   Lymph Nodes:  Neck:   Respiratory:   Cardiovascular:   Abdomen:   Extremities:   Skin:   Neurological:  Psychiatry:    HOSPITAL MEDICATIONS:  MEDICATIONS  (STANDING):  albuterol/ipratropium for Nebulization 3 milliLiter(s) Nebulizer every 4 hours  budesonide 160 MICROgram(s)/formoterol 4.5 MICROgram(s) Inhaler 2 Puff(s) Inhalation two times a day  chlorhexidine 4% Liquid 1 Application(s) Topical daily  dextrose 5%. 1000 milliLiter(s) (50 mL/Hr) IV Continuous <Continuous>  dextrose 50% Injectable 12.5 Gram(s) IV Push once  dextrose 50% Injectable 25 Gram(s) IV Push once  dextrose 50% Injectable 25 Gram(s) IV Push once  enoxaparin Injectable 40 milliGRAM(s) SubCutaneous daily  insulin glargine Injectable (LANTUS) 14 Unit(s) SubCutaneous at bedtime  insulin lispro (HumaLOG) corrective regimen sliding scale   SubCutaneous three times a day before meals  insulin lispro (HumaLOG) corrective regimen sliding scale   SubCutaneous at bedtime  insulin lispro Injectable (HumaLOG) 5 Unit(s) SubCutaneous three times a day before meals  methylPREDNISolone sodium succinate Injectable 40 milliGRAM(s) IV Push every 24 hours  piperacillin/tazobactam IVPB.. 3.375 Gram(s) IV Intermittent every 8 hours  tobramycin for Nebulization 300 milliGRAM(s) Inhalation every 12 hours    MEDICATIONS  (PRN):  ALBUTerol    90 MICROgram(s) HFA Inhaler 2 Puff(s) Inhalation every 6 hours PRN Wheezing  benzonatate 100 milliGRAM(s) Oral every 8 hours PRN Cough  dextrose 40% Gel 15 Gram(s) Oral once PRN Blood Glucose LESS THAN 70 milliGRAM(s)/deciliter  glucagon  Injectable 1 milliGRAM(s) IntraMuscular once PRN Glucose LESS THAN 70 milligrams/deciliter    LABS:                        12.8   10.47 )-----------( 164      ( 12 Aug 2020 04:59 )             41.7     08-12    138  |  100  |  24<H>  ----------------------------<  300<H>  4.1   |  28  |  0.49<L>    Ca    9.1      12 Aug 2020 04:59  Phos  3.2     08-12  Mg     2.2     08-12    TPro  6.5  /  Alb  3.5  /  TBili  0.3  /  DBili  x   /  AST  19  /  ALT  20  /  AlkPhos  117  08-12    PT/INR - ( 11 Aug 2020 06:30 )   PT: 11.3 SEC;   INR: 0.98       PTT - ( 11 Aug 2020 06:30 )  PTT:28.0 SEC    Venous Blood Gas:  08-10 @ 15:00  7.38/51/78/28/94.5  VBG Lactate: 2.2    MICROBIOLOGY:     RADIOLOGY:  [ ] Reviewed and interpreted by me    PULMONARY FUNCTION TESTS:    EKG: CHIEF COMPLAINT: Patient is a 65y old  Female who presents with a chief complaint of copd exac (11 Aug 2020 14:53)    SUBJECTIVE: No interval events overnight. Seen by bedside during AM rounds with excessive cough. s/p Hycodan with improvement. Wheezing improved slightly. SOB noted, but similar to prior and better with O2. Denies CP or abdominal pain. Continue Solumedrol and Duonebs. SCx with GPC/GNR and pending speciation.     OBJECTIVE:  ICU Vital Signs Last 24 Hrs  T(C): 36.8 (12 Aug 2020 06:39), Max: 36.8 (11 Aug 2020 18:41)  T(F): 98.3 (12 Aug 2020 06:39), Max: 98.3 (12 Aug 2020 06:39)  HR: 77 (12 Aug 2020 08:27) (63 - 93)  BP: 138/74 (12 Aug 2020 06:39) (126/60 - 138/74)  BP(mean): --  ABP: --  ABP(mean): --  RR: 18 (12 Aug 2020 06:39) (17 - 18)  SpO2: 98% (12 Aug 2020 06:39) (93% - 99%)    CAPILLARY BLOOD GLUCOSE  POCT Blood Glucose.: 243 mg/dL (12 Aug 2020 07:38)    PHYSICAL EXAM:  General: NAD   Cards: S1/S2, no murmurs   Pulm: Wheezing and Rhonchi slightly improved throughout.   Abdomen: Soft, NTND. BS (+)   Extremities: No pedal edema. CATRINA of BL upper and lower extremities.  Neurology: AOx3 with no focal neurological deficits     HOSPITAL MEDICATIONS:  MEDICATIONS  (STANDING):  albuterol/ipratropium for Nebulization 3 milliLiter(s) Nebulizer every 4 hours  budesonide 160 MICROgram(s)/formoterol 4.5 MICROgram(s) Inhaler 2 Puff(s) Inhalation two times a day  chlorhexidine 4% Liquid 1 Application(s) Topical daily  dextrose 5%. 1000 milliLiter(s) (50 mL/Hr) IV Continuous <Continuous>  dextrose 50% Injectable 12.5 Gram(s) IV Push once  dextrose 50% Injectable 25 Gram(s) IV Push once  dextrose 50% Injectable 25 Gram(s) IV Push once  enoxaparin Injectable 40 milliGRAM(s) SubCutaneous daily  insulin glargine Injectable (LANTUS) 14 Unit(s) SubCutaneous at bedtime  insulin lispro (HumaLOG) corrective regimen sliding scale   SubCutaneous three times a day before meals  insulin lispro (HumaLOG) corrective regimen sliding scale   SubCutaneous at bedtime  insulin lispro Injectable (HumaLOG) 5 Unit(s) SubCutaneous three times a day before meals  methylPREDNISolone sodium succinate Injectable 40 milliGRAM(s) IV Push every 24 hours  piperacillin/tazobactam IVPB.. 3.375 Gram(s) IV Intermittent every 8 hours  tobramycin for Nebulization 300 milliGRAM(s) Inhalation every 12 hours    MEDICATIONS  (PRN):  ALBUTerol    90 MICROgram(s) HFA Inhaler 2 Puff(s) Inhalation every 6 hours PRN Wheezing  benzonatate 100 milliGRAM(s) Oral every 8 hours PRN Cough  dextrose 40% Gel 15 Gram(s) Oral once PRN Blood Glucose LESS THAN 70 milliGRAM(s)/deciliter  glucagon  Injectable 1 milliGRAM(s) IntraMuscular once PRN Glucose LESS THAN 70 milligrams/deciliter    LABS:                        12.8   10.47 )-----------( 164      ( 12 Aug 2020 04:59 )             41.7     08-12    138  |  100  |  24<H>  ----------------------------<  300<H>  4.1   |  28  |  0.49<L>    Ca    9.1      12 Aug 2020 04:59  Phos  3.2     08-12  Mg     2.2     08-12    TPro  6.5  /  Alb  3.5  /  TBili  0.3  /  DBili  x   /  AST  19  /  ALT  20  /  AlkPhos  117  08-12    PT/INR - ( 11 Aug 2020 06:30 )   PT: 11.3 SEC;   INR: 0.98       PTT - ( 11 Aug 2020 06:30 )  PTT:28.0 SEC    Venous Blood Gas:  08-10 @ 15:00  7.38/51/78/28/94.5  VBG Lactate: 2.2    MICROBIOLOGY:     RADIOLOGY:  [ ] Reviewed and interpreted by me    PULMONARY FUNCTION TESTS:    EKG:

## 2020-08-12 NOTE — DISCHARGE NOTE PROVIDER - NSDCMRMEDTOKEN_GEN_ALL_CORE_FT
Admelog SoloStar 100 units/mL injectable solution: 5 unit(s) injectable 3 times a day (before meals)  albuterol 90 mcg/inh inhalation aerosol: 1 to 2 puff(s) inhaled every 4 to 6 hours, As Needed  amLODIPine 5 mg oral tablet: 1 tab(s) orally once a day  Artificial Tears ophthalmic solution: 1 drop(s) to each affected eye 4 times a day, As Needed  Basaglar KwikPen 100 units/mL subcutaneous solution: 25 unit(s) subcutaneous once a day (at bedtime)  bisacodyl 5 mg oral delayed release tablet: 1 tab(s) orally 2 times a day, As Needed  budesonide 0.5 mg/2 mL inhalation suspension: 2 milliliter(s) inhaled every 12 hours  Calcium 600+D oral tablet: 1 tab(s) orally once a day  famotidine 40 mg oral tablet: 1 tab(s) orally once a day (at bedtime)  ferrous gluconate 324 mg (37.5 mg elemental iron) oral tablet: 1 tab(s) orally 2 times a day  Flonase 50 mcg/inh nasal spray: 1 spray(s) in each nostril once a day  glimepiride 2 mg oral tablet: 1 tab(s) orally every 12 hours  metFORMIN 1000 mg oral tablet: 1 tab(s) orally 2 times a day  montelukast 10 mg oral tablet: 1 tab(s) orally once a day  omeprazole 20 mg oral delayed release capsule: 1 cap(s) orally once a day  Perforomist 20 mcg/2 mL inhalation solution: 2 milliliter(s) inhaled every 12 hours  Senna 8.6 mg oral tablet: 1 tab(s) orally 2 times a day  sertraline 50 mg oral tablet: 1 tab(s) orally once a day  tobramycin 60 mg/mL inhalation solution: 5 milliliter(s) inhaled 2 times a day  Vitamin C 500 mg oral tablet: 1 tab(s) orally once a day  Vitamin D2 50,000 intl units (1.25 mg) oral capsule: 1 cap(s) orally once a week Admelog SoloStar 100 units/mL injectable solution: 5 unit(s) injectable 3 times a day (before meals)  albuterol 90 mcg/inh inhalation aerosol: 1 to 2 puff(s) inhaled every 4 to 6 hours, As Needed  amLODIPine 5 mg oral tablet: 1 tab(s) orally once a day  Artificial Tears ophthalmic solution: 1 drop(s) to each affected eye 4 times a day, As Needed  Basaglar KwikPen 100 units/mL subcutaneous solution: 25 unit(s) subcutaneous once a day (at bedtime)  bisacodyl 5 mg oral delayed release tablet: 1 tab(s) orally 2 times a day, As Needed  budesonide 0.5 mg/2 mL inhalation suspension: 2 milliliter(s) inhaled every 12 hours  Calcium 600+D oral tablet: 1 tab(s) orally once a day  ferrous gluconate 324 mg (37.5 mg elemental iron) oral tablet: 1 tab(s) orally 2 times a day  Flonase 50 mcg/inh nasal spray: 1 spray(s) in each nostril once a day  glimepiride 2 mg oral tablet: 1 tab(s) orally every 12 hours  metFORMIN 1000 mg oral tablet: 1 tab(s) orally 2 times a day  montelukast 10 mg oral tablet: 1 tab(s) orally once a day  omeprazole 20 mg oral delayed release capsule: 1 cap(s) orally once a day  Perforomist 20 mcg/2 mL inhalation solution: 2 milliliter(s) inhaled every 12 hours  predniSONE 10 mg oral tablet: 6 tabs starting 8/20 x 5 days then   5 tbas x 7 days   4 tabs x 7 days   3 tabs x 7 days   2 tabs x 7 days   1 tabs x 7 days (last day 9/28)   Senna 8.6 mg oral tablet: 1 tab(s) orally 2 times a day  tobramycin 75 mg/mL inhalation solution: 4 milliliter(s) inhaled every 12 hours through 9/7, then monitor off from 9/8-10/5 and restart on 10/6. Continue every other month. Follow up with Dr. Young.   venlafaxine 37.5 mg oral capsule, extended release: 1 cap(s) orally once a day  Vitamin C 500 mg oral tablet: 1 tab(s) orally once a day  Vitamin D2 50,000 intl units (1.25 mg) oral capsule: 1 cap(s) orally once a week

## 2020-08-12 NOTE — DISCHARGE NOTE PROVIDER - INSTRUCTIONS
Low salt/fat diet with consistent carbohydrates Low salt/fat diet with consistent carbohydrates  Evening snack can be given   Glucerna shakes three times a day

## 2020-08-12 NOTE — PROGRESS NOTE ADULT - ASSESSMENT
64 YO Leandro speaking Female with PMHx of Diffuse Cystitic Bronchiectasis (Pseudomonas and Stenotrophmonas on previous cx) on intermittent on Prednisone, Primary Ciliary Dyskinesia, Allergic Bronchopulmonary Aspergillosis, HTN, DM2 A1C 8.7 (8/2020) p/w worsening productive cough, SOB, CP and intermittent fever over last 2 weeks. CXR unchanged with BL hazy opacities. Duonebs and Solumedrol given and admitted to Medicine. During admission, chest PT needed with NC trended up to 5L and transferred to RCU on 8/11.     # Bronchiectasis Exacerbation r/o Infection   - CXR with unchanged hazy opacities  - COVID negative.    - Prednisone 40mg PO QD changed to Solumedrol 40mg IV QD for now.   - Duonebs Q4H with Proventil Q6H PRN. Symbicort BID.   - DC Cipro. Started Zosyn and Jerry 8/11 for now as patient noted with  Pseudomonas in Cx.   - Blood culture sent. Sputum culture pending. Check COVID AB   - Monitor O2 and wean off as tolerated.   - Chest PT Q6H and Suction PRN.     # HTN  - Holding home Norvasc 5mg PO QD.   - Monitor BP and continue diet modifications for now.     # IDDM2 A1C 8.7 (8/2020)  - Continue on Lantus 14 U QHS and Lispro 5 U AC with moderate ISS ACHS.   - Monitor FS and adjust as needed while on steroids.     # DVT PPX with Lovenox  # DISPO - RCU. PT consulted. 64 YO Leandro speaking Female with PMHx of Diffuse Cystitic Bronchiectasis (Pseudomonas and Stenotrophmonas on previous cx) on intermittent on Prednisone, Primary Ciliary Dyskinesia, Allergic Bronchopulmonary Aspergillosis, HTN, DM2 A1C 8.7 (8/2020) p/w worsening productive cough, SOB, CP and intermittent fever over last 2 weeks. CXR unchanged with BL hazy opacities. Duonebs and Solumedrol given and admitted to Medicine. During admission, chest PT needed with NC trended up to 5L and transferred to RCU on 8/11.     # Bronchiectasis Exacerbation r/o Infection   - CXR with unchanged hazy opacities  - COVID negative and AB negative  - Prednisone 40mg PO QD changed to Solumedrol 40mg IV QD for now.   - Duonebs Q4H with Proventil Q6H PRN. Symbicort BID.   - DC Cipro. Started Zosyn and Jerry 8/11 for now as patient noted with  Pseudomonas in Cx.   - Continue Cough Syrup Q6H PRN. Hycodan at bedtime PRN.   - Blood culture pending. Sputum culture GNR/GPC pending speciation.    - Monitor O2 and wean off as tolerated.   - Chest PT Q6H and Suction PRN.     # HTN  - Holding home Norvasc 5mg PO QD.   - Monitor BP and continue diet modifications for now.     # IDDM2 A1C 8.7 (8/2020)  - Continue on Lantus 14 U QHS and Lispro 5 U AC with moderate ISS ACHS.   - Monitor FS and adjust as needed while on steroids.     # DVT PPX with Lovenox  # DISPO - RCU. PT consulted.

## 2020-08-12 NOTE — DIETITIAN INITIAL EVALUATION ADULT. - OTHER INFO
Per chart review patient with medical history of diffuse cystitic bronchiectasis, intermittently on prednisone, primary ciliary dyskinesia, ABPA, HTN, and DM2 presenting with worsening productive cough, SOB, chest pain and intermittent low grade fever over last 2 weeks. Patient Leandro speaking, declined  service and preferred Son, who was on phone call with patient, to translate. At baseline patient consumes ~3 meals daily. Does not eat beef or pork. Avoids greasy fried foods, potatoes and rice. Mainly consumes chicken, fish, steamed vegetables, whole wheat bread. NKFA. HbA1c 8.7% - likely elevated in setting of steroid therapy as patient controls carbohydrate intake at home.     Patient with decreased PO intake in setting of worsening cough prior to admission. Reports UBW ~47kg (1/16/20) -> admit weight 43kg (8/11); indicates 8% weight loss in 6 months. Patient reports eating okay in-house. Reports consuming most of breakfast today. Requests to receive oatmeal and eggs for breakfast and  fish and salad with lunch and dinner. Patient amenable to Glucerna Shakes to supplement PO intake.

## 2020-08-12 NOTE — DIETITIAN INITIAL EVALUATION ADULT. - ETIOLOGY
related to decreased appetite/PO intake, increased nutrient needs related to weight loss, chronic illness

## 2020-08-12 NOTE — DIETITIAN INITIAL EVALUATION ADULT. - PERTINENT LABORATORY DATA
08-12 Na 138 mmol/L Glu 300 mg/dL<H> K+ 4.1 mmol/L Cr 0.49 mg/dL<L> BUN 24 mg/dL<H> Phos 3.2 mg/dL Alb 3.5 g/dL PAB n/a   Hgb 12.8 g/dL Hct 41.7 % HgA1C n/a    Glucose, Serum: 300 mg/dL <H>   24Hr FS:90 mg/dL  243 mg/dL  201 mg/dL  285 mg/dL

## 2020-08-12 NOTE — CHART NOTE - NSCHARTNOTEFT_GEN_A_CORE
NUTRITION SERVICES     Upon Nutritional Assessment by the Registered Dietitian your patient was determined to meet criteria/ has evidence of the following diagnosis/diagnoses:  [x] Severe Protein Calorie Malnutrition     [x] Underweight / BMI <19      Findings as based on:  •  Comprehensive nutritional assessment and consultation    Please refer to Initial Dietitian Evaluation or Nutrition Follow-up via documents section of [employer] EMR for further recommendations.

## 2020-08-12 NOTE — DIETITIAN INITIAL EVALUATION ADULT. - PERTINENT MEDS FT
benzonatate PRN  budesonide 160 MICROgram(s)/formoterol 4.5 MICROgram(s) Inhaler  enoxaparin Injectable  insulin glargine Injectable (LANTUS)  insulin lispro (HumaLOG) corrective regimen sliding scale  insulin lispro (HumaLOG) corrective regimen sliding scale  insulin lispro Injectable (HumaLOG)  methylPREDNISolone sodium succinate Injectable  multivitamin  piperacillin/tazobactam IVPB..  simethicone PRN

## 2020-08-12 NOTE — DIETITIAN INITIAL EVALUATION ADULT. - RD TO REMAIN AVAILABLE
3. Please Encourage po intake, assist with meals and menu selections, provide alternatives PRN. 4. Nutrition and  Department will honor food and beverage preferences of patient in an effort to maximize level of nutrient intake.

## 2020-08-12 NOTE — DISCHARGE NOTE PROVIDER - CARE PROVIDER_API CALL
Dr. Cynthia  Pulmonology  Phone: (   )    -  Fax: (   )    -  Follow Up Time: Shelli Young  CRITICAL CARE MEDICINE  [address] [place] [address]  Phone: [phone]  Fax: [phone]  Follow Up Time:

## 2020-08-12 NOTE — PROGRESS NOTE ADULT - ATTENDING COMMENTS
Bronchiectasis with exacerbation, pseudomonas exacerbation  Zosyn and tobramycin inhaled  Duonebs q4h  Solumedrol 40mg

## 2020-08-12 NOTE — DISCHARGE NOTE PROVIDER - HOSPITAL COURSE
64 YO Leandro speaking Female with PMHx of Diffuse Cystitic Bronchiectasis (no home O2 and Pseudomonas and Stenotrophmonas on previous cx) on intermittent on Prednisone, Primary Ciliary Dyskinesia, Allergic Bronchopulmonary Aspergillosis, HTN, DM2 A1C 8.7 (8/2020) p/w worsening productive cough, SOB, CP and intermittent fever over last 2 weeks. Attempting to avoid coming to the hospital, patient sought medical attention outpatient. Culture taken and noted with Pseudomonas. Inhaled Tobramycin, Medrol Pack and Symbicort replaced with Perforomist/ Budesonide prescribed outpatient, however cough and symptoms worsened of which prompted her to come to the ED. In the ED, CXR unchanged with BL hazy opacities. Duonebs and Solumedrol given and admitted to Medicine. During admission, chest PT needed with NC trended up to 5L and transferred to RCU on 8/11.         In RCU, Solumedrol 40mg IV QD and Duonebs Q4H started. Given hx of Pseudomonas and attempt to treat outpatient, Zosyn and Inhaled Tobramycin continued in house. SCx ____. BCx _____. 64 YO Leandro speaking Female with PMHx of Diffuse Cystitic Bronchiectasis (no home O2 and Pseudomonas and Stenotrophmonas on previous cx) on intermittent on Prednisone, Primary Ciliary Dyskinesia, Allergic Bronchopulmonary Aspergillosis, HTN, DM2 A1C 8.7 (8/2020) p/w worsening productive cough, SOB, CP and intermittent fever over last 2 weeks. Attempting to avoid coming to the hospital, patient sought medical attention outpatient. Culture taken and noted with Pseudomonas. Inhaled Tobramycin, Medrol Pack and Symbicort replaced with Perforomist/ Budesonide prescribed outpatient, however cough and symptoms worsened of which prompted her to come to the ED. In the ED, CXR unchanged with BL hazy opacities. Duonebs and Solumedrol given and admitted to Medicine. During admission, chest PT needed with NC trended up to 5L and transferred to RCU on 8/11.         In RCU, Solumedrol 40mg IV QD and Duonebs Q4H started. Given hx of Pseudomonas and attempt to treat outpatient, Zosyn and Inhaled Tobramycin continued in house. Transitioned to Levaquin as sensitive based on cultures with Pseudomonas (Blood Cx NGTD); Also transitioned to oral Prednisone 60mg QD; Patient more hyperglycemic with steroid use and continued on basal/bolus insulin; IS and Acapella encouraged. Chest PT Q6H and Suction PRN w/ 3% saline nebs; Tolerated 2-3L O2 via NC        BP medications initially held and re-started on 8/17 with Norvasc - BP tolerated         Allergic Bronchopulmonary Aspergillosis; Fungal sputum cx; Aspergillus precipitan, IGg, immunoglobulin ----        Dispo - Home 64 YO Leandro speaking Female with PMHx of Diffuse Cystitic Bronchiectasis (no home O2 and Pseudomonas and Stenotrophmonas on previous cx) on intermittent on Prednisone, Primary Ciliary Dyskinesia, Allergic Bronchopulmonary Aspergillosis, HTN, DM2 A1C 8.7 (8/2020) p/w worsening productive cough, SOB, CP and intermittent fever over last 2 weeks. Attempting to avoid coming to the hospital, patient sought medical attention outpatient. Culture taken and noted with Pseudomonas. Inhaled Tobramycin, Medrol Pack and Symbicort replaced with Perforomist/ Budesonide prescribed outpatient, however cough and symptoms worsened of which prompted her to come to the ED. In the ED, CXR unchanged with BL hazy opacities. Duonebs and Solumedrol given and admitted to Medicine. During admission, chest PT needed with NC trended up to 5L and transferred to RCU on 8/11.         In RCU, Solumedrol 40mg IV QD and Duonebs Q4H started. Given hx of Pseudomonas and attempt to treat outpatient, Zosyn and Inhaled Tobramycin started empirically in house. Blood cultures remained negative. Fungal sputum Cx negative. Scx grew pansensitive Pseudomonas and ABX transitioned to Levaquin. LVQ completed in house. Tobramycin to be continued every other Month (ON 8/11-9/7, OFF 9/8-10/5, Restart ON 10/5). Tobramycin to be monitored outpatient by Dr. Young. Steroids transitioned to oral Prednisone 60mg QD and to be tapered off by 10mg every 7 days. Secretions improved. O2 required at home. Course complicated by HTN off BP medications. Norvasc restarted and BP improved.         On 8/19, case discussed with Dr. Mack and patient is medically stable and cleared for discharge home.

## 2020-08-12 NOTE — DIETITIAN INITIAL EVALUATION ADULT. - PHYSICAL APPEARANCE
underweight/other (specify) nutrition focused physical exam: Moderate temporal muscle wasting, severe quadriceps, calf, and shoulder muscle loss, moderate orbital fat pad depletion, severe triceps fat loss.    Skin: No pressure ulcers/DTI noted in flowsheets.   Edema: none noted

## 2020-08-12 NOTE — DISCHARGE NOTE PROVIDER - NSDCFUSCHEDAPPT_GEN_ALL_CORE_FT
GRACIE BHAKTA ; 09/01/2020 ; NPP [facility] OP [id number] [facility] GRACIE BHAKTA ; 09/01/2020 ; NPP [facility] OP [address] [facility] GRACIE BHAKTA ; 09/01/2020 ; NPP [facility] OP [id number] [facility] GRACIE BHAKTA ; 09/01/2020 ; NPP [facility] [address] [facility] GRACIE BHAKTA ; 09/01/2020 ; NPP [facility] OP [id number] [facility] GRACIE BHAKTA ; 09/01/2020 ; NPP Endocrin OP [id number] [facility]  GRACIE BHAKTA ; 09/10/2020 ; NPP Med Pulm [address] [facility]

## 2020-08-13 LAB
-  AMIKACIN: SIGNIFICANT CHANGE UP
-  AZTREONAM: SIGNIFICANT CHANGE UP
-  CEFEPIME: SIGNIFICANT CHANGE UP
-  CEFTAZIDIME: SIGNIFICANT CHANGE UP
-  CIPROFLOXACIN: SIGNIFICANT CHANGE UP
-  GENTAMICIN: SIGNIFICANT CHANGE UP
-  IMIPENEM: SIGNIFICANT CHANGE UP
-  LEVOFLOXACIN: SIGNIFICANT CHANGE UP
-  MEROPENEM: SIGNIFICANT CHANGE UP
-  PIPERACILLIN/TAZOBACTAM: SIGNIFICANT CHANGE UP
-  TOBRAMYCIN: SIGNIFICANT CHANGE UP
CULTURE RESULTS: SIGNIFICANT CHANGE UP
GLUCOSE BLDC GLUCOMTR-MCNC: 113 MG/DL — HIGH (ref 70–99)
GLUCOSE BLDC GLUCOMTR-MCNC: 119 MG/DL — HIGH (ref 70–99)
GLUCOSE BLDC GLUCOMTR-MCNC: 279 MG/DL — HIGH (ref 70–99)
GLUCOSE BLDC GLUCOMTR-MCNC: 293 MG/DL — HIGH (ref 70–99)
METHOD TYPE: SIGNIFICANT CHANGE UP
ORGANISM # SPEC MICROSCOPIC CNT: SIGNIFICANT CHANGE UP
SPECIMEN SOURCE: SIGNIFICANT CHANGE UP

## 2020-08-13 PROCEDURE — 99233 SBSQ HOSP IP/OBS HIGH 50: CPT | Mod: GC

## 2020-08-13 RX ORDER — SERTRALINE 25 MG/1
50 TABLET, FILM COATED ORAL DAILY
Refills: 0 | Status: DISCONTINUED | OUTPATIENT
Start: 2020-08-13 | End: 2020-08-14

## 2020-08-13 RX ORDER — INSULIN LISPRO 100/ML
8 VIAL (ML) SUBCUTANEOUS
Refills: 0 | Status: DISCONTINUED | OUTPATIENT
Start: 2020-08-13 | End: 2020-08-18

## 2020-08-13 RX ORDER — FLUTICASONE PROPIONATE 50 MCG
1 SPRAY, SUSPENSION NASAL
Refills: 0 | Status: DISCONTINUED | OUTPATIENT
Start: 2020-08-13 | End: 2020-08-19

## 2020-08-13 RX ORDER — LEVALBUTEROL 1.25 MG/.5ML
0.63 SOLUTION, CONCENTRATE RESPIRATORY (INHALATION) EVERY 4 HOURS
Refills: 0 | Status: DISCONTINUED | OUTPATIENT
Start: 2020-08-13 | End: 2020-08-18

## 2020-08-13 RX ADMIN — Medication 6: at 11:49

## 2020-08-13 RX ADMIN — PIPERACILLIN AND TAZOBACTAM 25 GRAM(S): 4; .5 INJECTION, POWDER, LYOPHILIZED, FOR SOLUTION INTRAVENOUS at 23:14

## 2020-08-13 RX ADMIN — BUDESONIDE AND FORMOTEROL FUMARATE DIHYDRATE 2 PUFF(S): 160; 4.5 AEROSOL RESPIRATORY (INHALATION) at 09:26

## 2020-08-13 RX ADMIN — PIPERACILLIN AND TAZOBACTAM 25 GRAM(S): 4; .5 INJECTION, POWDER, LYOPHILIZED, FOR SOLUTION INTRAVENOUS at 08:09

## 2020-08-13 RX ADMIN — PIPERACILLIN AND TAZOBACTAM 25 GRAM(S): 4; .5 INJECTION, POWDER, LYOPHILIZED, FOR SOLUTION INTRAVENOUS at 17:40

## 2020-08-13 RX ADMIN — Medication 3 MILLILITER(S): at 09:17

## 2020-08-13 RX ADMIN — Medication 100 MILLIGRAM(S): at 05:09

## 2020-08-13 RX ADMIN — CHLORHEXIDINE GLUCONATE 1 APPLICATION(S): 213 SOLUTION TOPICAL at 11:49

## 2020-08-13 RX ADMIN — Medication 3 MILLILITER(S): at 01:12

## 2020-08-13 RX ADMIN — LEVALBUTEROL 0.63 MILLIGRAM(S): 1.25 SOLUTION, CONCENTRATE RESPIRATORY (INHALATION) at 16:16

## 2020-08-13 RX ADMIN — BUDESONIDE AND FORMOTEROL FUMARATE DIHYDRATE 2 PUFF(S): 160; 4.5 AEROSOL RESPIRATORY (INHALATION) at 21:01

## 2020-08-13 RX ADMIN — INSULIN GLARGINE 14 UNIT(S): 100 INJECTION, SOLUTION SUBCUTANEOUS at 22:21

## 2020-08-13 RX ADMIN — Medication 1 SPRAY(S): at 23:15

## 2020-08-13 RX ADMIN — Medication 5 UNIT(S): at 08:08

## 2020-08-13 RX ADMIN — LEVALBUTEROL 0.63 MILLIGRAM(S): 1.25 SOLUTION, CONCENTRATE RESPIRATORY (INHALATION) at 21:00

## 2020-08-13 RX ADMIN — Medication 300 MILLIGRAM(S): at 21:00

## 2020-08-13 RX ADMIN — Medication 3 MILLILITER(S): at 05:06

## 2020-08-13 RX ADMIN — Medication 1 TABLET(S): at 11:50

## 2020-08-13 RX ADMIN — Medication 300 MILLIGRAM(S): at 09:20

## 2020-08-13 RX ADMIN — Medication 5 UNIT(S): at 11:49

## 2020-08-13 RX ADMIN — Medication 6: at 17:41

## 2020-08-13 RX ADMIN — Medication 8 UNIT(S): at 17:41

## 2020-08-13 RX ADMIN — ENOXAPARIN SODIUM 40 MILLIGRAM(S): 100 INJECTION SUBCUTANEOUS at 11:50

## 2020-08-13 RX ADMIN — Medication 40 MILLIGRAM(S): at 14:38

## 2020-08-13 NOTE — PROGRESS NOTE ADULT - ASSESSMENT
64 YO Leandro speaking Female with PMHx of Diffuse Cystitic Bronchiectasis (Pseudomonas and Stenotrophmonas on previous cx) on intermittent on Prednisone, Primary Ciliary Dyskinesia, Allergic Bronchopulmonary Aspergillosis, HTN, DM2 A1C 8.7 (8/2020) p/w worsening productive cough, SOB, CP and intermittent fever over last 2 weeks. CXR unchanged with BL hazy opacities. Duonebs and Solumedrol given and admitted to Medicine. During admission, chest PT needed with NC trended up to 5L and transferred to RCU on 8/11.     # Bronchiectasis Exacerbation r/o Infection   - CXR with unchanged hazy opacities  - COVID negative and AB negative  - Prednisone 40mg PO QD changed to Solumedrol 40mg IV QD for now.   - Duonebs Q4H with Proventil Q6H PRN. Symbicort BID.   - DC Cipro. Started Zosyn and Jerry 8/11 for now as patient noted with  Pseudomonas in Cx.   - Continue Cough Syrup Q6H PRN. Hycodan at bedtime PRN.   - Blood culture pending. Sputum culture GNR/GPC pending speciation.    - Monitor O2 and wean off as tolerated.   - Chest PT Q6H and Suction PRN.     # HTN  - Holding home Norvasc 5mg PO QD.   - Monitor BP and continue diet modifications for now.     # IDDM2 A1C 8.7 (8/2020)  - Continue on Lantus 14 U QHS and Lispro 5 U AC with moderate ISS ACHS.   - Monitor FS and adjust as needed while on steroids.     # DVT PPX with Lovenox  # DISPO - RCU. PT consulted. 66 YO Leandro speaking Female with PMHx of Diffuse Cystitic Bronchiectasis (Pseudomonas and Stenotrophmonas on previous cx) on intermittent on Prednisone, Primary Ciliary Dyskinesia, Allergic Bronchopulmonary Aspergillosis, HTN, DM2 A1C 8.7 (8/2020) p/w worsening productive cough, SOB, CP and intermittent fever over last 2 weeks. CXR unchanged with BL hazy opacities. Duonebs and Solumedrol given and admitted to Medicine. During admission, chest PT needed with NC trended up to 5L and transferred to RCU on 8/11.     # Bronchiectasis Exacerbation r/o Infection   - CXR with unchanged hazy opacities  - COVID negative and AB negative  - Prednisone 40mg PO QD changed to Solumedrol 40mg IV QD for now.   - Duonebs Q4H with Proventil Q6H PRN. Symbicort BID.   - DC Cipro. Started Zosyn and Jerry 8/11 for now as patient noted with  Pseudomonas in Cx.   - BCx NGTD. SCx (+) for Pseudomonas, pending sensitivities   - Continue Cough Syrup Q6H PRN. Hycodan at bedtime PRN.   - Monitor O2 and wean off as tolerated.   - IS and Acapella encouraged. Chest PT Q6H and Suction PRN.     # HTN  - Holding home Norvasc 5mg PO QD.   - Monitor BP and continue diet modifications for now.     # IDDM2 A1C 8.7 (8/2020)  - Continue on Lantus 14 U QHS and Lispro 5 U AC with moderate ISS ACHS.   - Monitor FS and adjust as needed while on steroids.     # DVT PPX with Lovenox  # DISPO - RCU. PT consulted. 66 YO Leandro speaking Female with PMHx of Diffuse Cystitic Bronchiectasis (Pseudomonas and Stenotrophmonas on previous cx) on intermittent on Prednisone, Primary Ciliary Dyskinesia, Allergic Bronchopulmonary Aspergillosis, HTN, DM2 A1C 8.7 (8/2020) p/w worsening productive cough, SOB, CP and intermittent fever over last 2 weeks. CXR unchanged with BL hazy opacities. Duonebs and Solumedrol given and admitted to Medicine. During admission, chest PT needed with NC trended up to 5L and transferred to RCU on 8/11.     # Bronchiectasis Exacerbation 2/2 Pseudomonas PNA   - CXR with unchanged hazy opacities  - COVID negative and AB negative  - BCx NGTD. SCx (+) for Pseudomonas, pending sensitivities   - Continue Cough Syrup Q6H PRN. Hycodan at bedtime PRN.   - DC Cipro. Started Zosyn and Jerry 8/11   - Prednisone 40mg PO QD changed to Solumedrol 40mg IV QD for now.   - Duonebs (changed to Xopenex) Q4H. Symbicort BID.   - Monitor O2 and wean off as tolerated.   - IS and Acapella encouraged. Chest PT Q6H and Suction PRN.     # HTN  - Holding home Norvasc 5mg PO QD.   - Monitor BP and continue diet modifications for now.     # IDDM2 A1C 8.7 (8/2020)  - Continue on Lantus 14 U QHS and Lispro 5 U AC with moderate ISS ACHS.   - Monitor FS and adjust as needed while on steroids.     # DVT PPX with Lovenox  # DISPO - RCU. PT consulted. 64 YO Leandro speaking Female with PMHx of Diffuse Cystitic Bronchiectasis (Pseudomonas and Stenotrophmonas on previous cx) on intermittent on Prednisone, Primary Ciliary Dyskinesia, Allergic Bronchopulmonary Aspergillosis, HTN, DM2 A1C 8.7 (8/2020) p/w worsening productive cough, SOB, CP and intermittent fever over last 2 weeks. CXR unchanged with BL hazy opacities. Duonebs and Solumedrol given and admitted to Medicine. During admission, chest PT needed with NC trended up to 5L and transferred to RCU on 8/11.     # Bronchiectasis Exacerbation 2/2 Pseudomonas PNA   - CXR with unchanged hazy opacities  - COVID negative and AB negative  - BCx NGTD. SCx (+) for Pseudomonas, pending sensitivities   - Continue Cough Syrup Q6H PRN. Hycodan at bedtime PRN.   - DC Cipro. Started Zosyn and Jerry 8/11   - Prednisone 40mg PO QD changed to Solumedrol 40mg IV QD for now.   - Duonebs (changed to Xopenex) Q4H. Symbicort BID.   - Monitor O2 and wean off as tolerated.   - IS and Acapella encouraged. Chest PT Q6H and Suction PRN.     # HTN  - Holding home Norvasc 5mg PO QD.   - Monitor BP and continue diet modifications for now.     # IDDM2 A1C 8.7 (8/2020)  - Continue on Lantus 14 U QHS and Lispro 8 U AC with moderate ISS ACHS.   - Monitor FS and adjust as needed while on steroids.     # DVT PPX with Lovenox  # DISPO - RCU. PT consulted.

## 2020-08-13 NOTE — PHYSICAL THERAPY INITIAL EVALUATION ADULT - GENERAL OBSERVATIONS, REHAB EVAL
Patient found in semi reclined position; +IV; +02 at 3L/min NC; Leandro speaking with daughter in law able to translate via telephone.

## 2020-08-13 NOTE — PHYSICAL THERAPY INITIAL EVALUATION ADULT - PLANNED THERAPY INTERVENTIONS, PT EVAL
gait training/transfer training/balance training/bed mobility training/Patient left supine in bed in NAD, call bell in reach, all lines intact./strengthening

## 2020-08-13 NOTE — PROGRESS NOTE ADULT - SUBJECTIVE AND OBJECTIVE BOX
CHIEF COMPLAINT: Patient is a 65y old  Female who presents with a chief complaint of Bronchiectasis Exacerbation (12 Aug 2020 17:46)    SUBJECTIVE: No interval events overnight. SCx grew Pseudomonas. Pending sensitivities.     [ ] All other systems negative  [ ] Unable to assess ROS because ________    OBJECTIVE:  ICU Vital Signs Last 24 Hrs  T(C): 36.3 (13 Aug 2020 05:07), Max: 37 (12 Aug 2020 13:30)  T(F): 97.3 (13 Aug 2020 05:07), Max: 98.6 (12 Aug 2020 13:30)  HR: 64 (13 Aug 2020 05:07) (64 - 96)  BP: 148/78 (13 Aug 2020 05:07) (127/63 - 148/78)  BP(mean): --  ABP: --  ABP(mean): --  RR: 18 (13 Aug 2020 05:07) (18 - 20)  SpO2: 98% (13 Aug 2020 05:07) (95% - 99%)    08-12 @ 07:01  -  08-13 @ 07:00  --------------------------------------------------------  IN: 580 mL / OUT: 0 mL / NET: 580 mL    CAPILLARY BLOOD GLUCOSE  POCT Blood Glucose.: 229 mg/dL (12 Aug 2020 21:19)    PHYSICAL EXAM:  General:   HEENT:   Lymph Nodes:  Neck:   Respiratory:   Cardiovascular:   Abdomen:   Extremities:   Skin:   Neurological:  Psychiatry:    HOSPITAL MEDICATIONS:  MEDICATIONS  (STANDING):  albuterol/ipratropium for Nebulization 3 milliLiter(s) Nebulizer every 4 hours  budesonide 160 MICROgram(s)/formoterol 4.5 MICROgram(s) Inhaler 2 Puff(s) Inhalation two times a day  chlorhexidine 4% Liquid 1 Application(s) Topical daily  dextrose 5%. 1000 milliLiter(s) (50 mL/Hr) IV Continuous <Continuous>  dextrose 50% Injectable 12.5 Gram(s) IV Push once  dextrose 50% Injectable 25 Gram(s) IV Push once  dextrose 50% Injectable 25 Gram(s) IV Push once  enoxaparin Injectable 40 milliGRAM(s) SubCutaneous daily  insulin glargine Injectable (LANTUS) 14 Unit(s) SubCutaneous at bedtime  insulin lispro (HumaLOG) corrective regimen sliding scale   SubCutaneous three times a day before meals  insulin lispro (HumaLOG) corrective regimen sliding scale   SubCutaneous at bedtime  insulin lispro Injectable (HumaLOG) 5 Unit(s) SubCutaneous three times a day before meals  methylPREDNISolone sodium succinate Injectable 40 milliGRAM(s) IV Push every 24 hours  multivitamin 1 Tablet(s) Oral daily  piperacillin/tazobactam IVPB.. 3.375 Gram(s) IV Intermittent every 8 hours  tobramycin for Nebulization 300 milliGRAM(s) Inhalation every 12 hours    MEDICATIONS  (PRN):  ALBUTerol    90 MICROgram(s) HFA Inhaler 2 Puff(s) Inhalation every 6 hours PRN Wheezing  benzonatate 100 milliGRAM(s) Oral every 8 hours PRN Cough  dextrose 40% Gel 15 Gram(s) Oral once PRN Blood Glucose LESS THAN 70 milliGRAM(s)/deciliter  glucagon  Injectable 1 milliGRAM(s) IntraMuscular once PRN Glucose LESS THAN 70 milligrams/deciliter  guaiFENesin   Syrup  (Sugar-Free) 200 milliGRAM(s) Oral every 6 hours PRN Cough  hydrocodone/homatropine Syrup 5 milliLiter(s) Oral at bedtime PRN Severe Cough  simethicone 80 milliGRAM(s) Chew every 12 hours PRN Gas    LABS:                        12.8   10.47 )-----------( 164      ( 12 Aug 2020 04:59 )             41.7     08-12    138  |  100  |  24<H>  ----------------------------<  300<H>  4.1   |  28  |  0.49<L>    Ca    9.1      12 Aug 2020 04:59  Phos  3.2     08-12  Mg     2.2     08-12    TPro  6.5  /  Alb  3.5  /  TBili  0.3  /  DBili  x   /  AST  19  /  ALT  20  /  AlkPhos  117  08-12    MICROBIOLOGY:     RADIOLOGY:  [ ] Reviewed and interpreted by me    PULMONARY FUNCTION TESTS:    EKG: CHIEF COMPLAINT: Patient is a 65y old  Female who presents with a chief complaint of Bronchiectasis Exacerbation (12 Aug 2020 17:46)    SUBJECTIVE: No interval events overnight. SCx grew Pseudomonas. Pending sensitivities. Seen by bedside and patient called daughter on the phone for discussion given language barrier. Notes coughing spells and palpitations with albuterol. SOB improved. HA or CP denied.     OBJECTIVE:  ICU Vital Signs Last 24 Hrs  T(C): 36.3 (13 Aug 2020 05:07), Max: 37 (12 Aug 2020 13:30)  T(F): 97.3 (13 Aug 2020 05:07), Max: 98.6 (12 Aug 2020 13:30)  HR: 64 (13 Aug 2020 05:07) (64 - 96)  BP: 148/78 (13 Aug 2020 05:07) (127/63 - 148/78)  BP(mean): --  ABP: --  ABP(mean): --  RR: 18 (13 Aug 2020 05:07) (18 - 20)  SpO2: 98% (13 Aug 2020 05:07) (95% - 99%)    08-12 @ 07:01  -  08-13 @ 07:00  --------------------------------------------------------  IN: 580 mL / OUT: 0 mL / NET: 580 mL    CAPILLARY BLOOD GLUCOSE  POCT Blood Glucose.: 229 mg/dL (12 Aug 2020 21:19)    PHYSICAL EXAM:  General: NAD   Cards: S1/S2, no murmurs   Pulm: (+) Rhonchi and Wheezing throughout   Abdomen: Soft, NTND. BS (+)   Extremities: No pedal edema. CATRINA of BL upper and lower extremities.  Neurology: AOx3 with no focal neurological deficits     HOSPITAL MEDICATIONS:  MEDICATIONS  (STANDING):  albuterol/ipratropium for Nebulization 3 milliLiter(s) Nebulizer every 4 hours  budesonide 160 MICROgram(s)/formoterol 4.5 MICROgram(s) Inhaler 2 Puff(s) Inhalation two times a day  chlorhexidine 4% Liquid 1 Application(s) Topical daily  dextrose 5%. 1000 milliLiter(s) (50 mL/Hr) IV Continuous <Continuous>  dextrose 50% Injectable 12.5 Gram(s) IV Push once  dextrose 50% Injectable 25 Gram(s) IV Push once  dextrose 50% Injectable 25 Gram(s) IV Push once  enoxaparin Injectable 40 milliGRAM(s) SubCutaneous daily  insulin glargine Injectable (LANTUS) 14 Unit(s) SubCutaneous at bedtime  insulin lispro (HumaLOG) corrective regimen sliding scale   SubCutaneous three times a day before meals  insulin lispro (HumaLOG) corrective regimen sliding scale   SubCutaneous at bedtime  insulin lispro Injectable (HumaLOG) 5 Unit(s) SubCutaneous three times a day before meals  methylPREDNISolone sodium succinate Injectable 40 milliGRAM(s) IV Push every 24 hours  multivitamin 1 Tablet(s) Oral daily  piperacillin/tazobactam IVPB.. 3.375 Gram(s) IV Intermittent every 8 hours  tobramycin for Nebulization 300 milliGRAM(s) Inhalation every 12 hours    MEDICATIONS  (PRN):  ALBUTerol    90 MICROgram(s) HFA Inhaler 2 Puff(s) Inhalation every 6 hours PRN Wheezing  benzonatate 100 milliGRAM(s) Oral every 8 hours PRN Cough  dextrose 40% Gel 15 Gram(s) Oral once PRN Blood Glucose LESS THAN 70 milliGRAM(s)/deciliter  glucagon  Injectable 1 milliGRAM(s) IntraMuscular once PRN Glucose LESS THAN 70 milligrams/deciliter  guaiFENesin   Syrup  (Sugar-Free) 200 milliGRAM(s) Oral every 6 hours PRN Cough  hydrocodone/homatropine Syrup 5 milliLiter(s) Oral at bedtime PRN Severe Cough  simethicone 80 milliGRAM(s) Chew every 12 hours PRN Gas    LABS:                        12.8   10.47 )-----------( 164      ( 12 Aug 2020 04:59 )             41.7     08-12    138  |  100  |  24<H>  ----------------------------<  300<H>  4.1   |  28  |  0.49<L>    Ca    9.1      12 Aug 2020 04:59  Phos  3.2     08-12  Mg     2.2     08-12    TPro  6.5  /  Alb  3.5  /  TBili  0.3  /  DBili  x   /  AST  19  /  ALT  20  /  AlkPhos  117  08-12    MICROBIOLOGY:     RADIOLOGY:  [ ] Reviewed and interpreted by me    PULMONARY FUNCTION TESTS:    EKG:

## 2020-08-13 NOTE — PHYSICAL THERAPY INITIAL EVALUATION ADULT - PERTINENT HX OF CURRENT PROBLEM, REHAB EVAL
65 year old female with listed history of diffuse cystitic bronchiectasis (pseduomonas and stenotrophomonas on previous cultures) intermittently on prednisone, primary ciliary dyskinesia, history of ABPA, HTN, DM. Pt with worsening productive cough with associated sob, cp when coughing, intermittent low grade fever over last 2 weeks. Pt NOT on home O2. Pt admitted for Bronchiectasis Exacerbation.

## 2020-08-14 LAB
GLUCOSE BLDC GLUCOMTR-MCNC: 114 MG/DL — HIGH (ref 70–99)
GLUCOSE BLDC GLUCOMTR-MCNC: 146 MG/DL — HIGH (ref 70–99)
GLUCOSE BLDC GLUCOMTR-MCNC: 150 MG/DL — HIGH (ref 70–99)
GLUCOSE BLDC GLUCOMTR-MCNC: 93 MG/DL — SIGNIFICANT CHANGE UP (ref 70–99)

## 2020-08-14 PROCEDURE — 99233 SBSQ HOSP IP/OBS HIGH 50: CPT | Mod: GC

## 2020-08-14 RX ORDER — SODIUM CHLORIDE 9 MG/ML
3 INJECTION INTRAMUSCULAR; INTRAVENOUS; SUBCUTANEOUS EVERY 6 HOURS
Refills: 0 | Status: DISCONTINUED | OUTPATIENT
Start: 2020-08-14 | End: 2020-08-19

## 2020-08-14 RX ORDER — VENLAFAXINE HCL 75 MG
37.5 CAPSULE, EXT RELEASE 24 HR ORAL DAILY
Refills: 0 | Status: DISCONTINUED | OUTPATIENT
Start: 2020-08-14 | End: 2020-08-19

## 2020-08-14 RX ORDER — BENZOCAINE AND MENTHOL 5; 1 G/100ML; G/100ML
1 LIQUID ORAL ONCE
Refills: 0 | Status: COMPLETED | OUTPATIENT
Start: 2020-08-14 | End: 2020-08-15

## 2020-08-14 RX ADMIN — Medication 8 UNIT(S): at 08:00

## 2020-08-14 RX ADMIN — Medication 300 MILLIGRAM(S): at 21:29

## 2020-08-14 RX ADMIN — ENOXAPARIN SODIUM 40 MILLIGRAM(S): 100 INJECTION SUBCUTANEOUS at 12:19

## 2020-08-14 RX ADMIN — SODIUM CHLORIDE 3 MILLILITER(S): 9 INJECTION INTRAMUSCULAR; INTRAVENOUS; SUBCUTANEOUS at 16:01

## 2020-08-14 RX ADMIN — BUDESONIDE AND FORMOTEROL FUMARATE DIHYDRATE 2 PUFF(S): 160; 4.5 AEROSOL RESPIRATORY (INHALATION) at 21:31

## 2020-08-14 RX ADMIN — BUDESONIDE AND FORMOTEROL FUMARATE DIHYDRATE 2 PUFF(S): 160; 4.5 AEROSOL RESPIRATORY (INHALATION) at 08:55

## 2020-08-14 RX ADMIN — LEVALBUTEROL 0.63 MILLIGRAM(S): 1.25 SOLUTION, CONCENTRATE RESPIRATORY (INHALATION) at 03:49

## 2020-08-14 RX ADMIN — INSULIN GLARGINE 14 UNIT(S): 100 INJECTION, SOLUTION SUBCUTANEOUS at 21:36

## 2020-08-14 RX ADMIN — Medication 300 MILLIGRAM(S): at 08:55

## 2020-08-14 RX ADMIN — Medication 1 SPRAY(S): at 21:36

## 2020-08-14 RX ADMIN — LEVALBUTEROL 0.63 MILLIGRAM(S): 1.25 SOLUTION, CONCENTRATE RESPIRATORY (INHALATION) at 01:15

## 2020-08-14 RX ADMIN — LEVALBUTEROL 0.63 MILLIGRAM(S): 1.25 SOLUTION, CONCENTRATE RESPIRATORY (INHALATION) at 12:18

## 2020-08-14 RX ADMIN — SODIUM CHLORIDE 3 MILLILITER(S): 9 INJECTION INTRAMUSCULAR; INTRAVENOUS; SUBCUTANEOUS at 21:29

## 2020-08-14 RX ADMIN — Medication 37.5 MILLIGRAM(S): at 12:18

## 2020-08-14 RX ADMIN — Medication 8 UNIT(S): at 16:59

## 2020-08-14 RX ADMIN — SERTRALINE 50 MILLIGRAM(S): 25 TABLET, FILM COATED ORAL at 12:19

## 2020-08-14 RX ADMIN — LEVALBUTEROL 0.63 MILLIGRAM(S): 1.25 SOLUTION, CONCENTRATE RESPIRATORY (INHALATION) at 16:01

## 2020-08-14 RX ADMIN — LEVALBUTEROL 0.63 MILLIGRAM(S): 1.25 SOLUTION, CONCENTRATE RESPIRATORY (INHALATION) at 21:29

## 2020-08-14 RX ADMIN — PIPERACILLIN AND TAZOBACTAM 25 GRAM(S): 4; .5 INJECTION, POWDER, LYOPHILIZED, FOR SOLUTION INTRAVENOUS at 08:00

## 2020-08-14 RX ADMIN — Medication 1 TABLET(S): at 12:19

## 2020-08-14 RX ADMIN — CHLORHEXIDINE GLUCONATE 1 APPLICATION(S): 213 SOLUTION TOPICAL at 12:18

## 2020-08-14 RX ADMIN — Medication 200 MILLIGRAM(S): at 21:36

## 2020-08-14 RX ADMIN — LEVALBUTEROL 0.63 MILLIGRAM(S): 1.25 SOLUTION, CONCENTRATE RESPIRATORY (INHALATION) at 08:54

## 2020-08-14 RX ADMIN — Medication 8 UNIT(S): at 12:04

## 2020-08-14 NOTE — PROGRESS NOTE ADULT - ATTENDING COMMENTS
patient with PCD, chronic pseudomonal respiratory infection, hx of ABPA. diffuse wheezing today and thick sputum.   ordered ABPA workup.  start airway clearance therapy with Alb neb, HS 3%, vest BID  can switch solumedrol to prednisone 60 mg daily (pt was recently on 40 mg at home)  continue IV zosyn and inhaled tobramycin; will d/w outpatient pulmonary practice to order neb tobramycin for QOM maintenance therapy. continue 14 days of anti-pseudomonal coverage, can switch to PO levaquin (PA is sensitive on most recent sputum cx).  Discontinue zoloft - pt does not appear to be benefitting and has poorly controlled generalized anxiety - start effexor.

## 2020-08-14 NOTE — PROGRESS NOTE ADULT - SUBJECTIVE AND OBJECTIVE BOX
CHIEF COMPLAINT: Patient is a 65y old  Female who presents with a chief complaint of copd exac (13 Aug 2020 07:46)      Interval Events: None overnight     REVIEW OF SYSTEMS:  Constitutional:   Eyes:  ENT:  CV:  Resp:  GI:  :  MSK:  Integumentary:  Neurological:  Psychiatric:  Endocrine:  Hematologic/Lymphatic:  Allergic/Immunologic:  [ ] All other systems negative    OBJECTIVE:  ICU Vital Signs Last 24 Hrs  T(C): 36.6 (14 Aug 2020 05:01), Max: 36.7 (13 Aug 2020 14:23)  T(F): 97.8 (14 Aug 2020 05:01), Max: 98.1 (13 Aug 2020 22:17)  HR: 62 (14 Aug 2020 05:01) (62 - 88)  BP: 137/69 (14 Aug 2020 05:01) (136/76 - 144/63)  BP(mean): --  ABP: --  ABP(mean): --  RR: 18 (14 Aug 2020 05:01) (16 - 18)  SpO2: 98% (14 Aug 2020 05:01) (96% - 100%)        CAPILLARY BLOOD GLUCOSE      POCT Blood Glucose.: 119 mg/dL (13 Aug 2020 22:20)      HOSPITAL MEDICATIONS:  MEDICATIONS  (STANDING):  budesonide 160 MICROgram(s)/formoterol 4.5 MICROgram(s) Inhaler 2 Puff(s) Inhalation two times a day  chlorhexidine 4% Liquid 1 Application(s) Topical daily  dextrose 5%. 1000 milliLiter(s) (50 mL/Hr) IV Continuous <Continuous>  dextrose 50% Injectable 12.5 Gram(s) IV Push once  dextrose 50% Injectable 25 Gram(s) IV Push once  dextrose 50% Injectable 25 Gram(s) IV Push once  enoxaparin Injectable 40 milliGRAM(s) SubCutaneous daily  insulin glargine Injectable (LANTUS) 14 Unit(s) SubCutaneous at bedtime  insulin lispro (HumaLOG) corrective regimen sliding scale   SubCutaneous three times a day before meals  insulin lispro (HumaLOG) corrective regimen sliding scale   SubCutaneous at bedtime  insulin lispro Injectable (HumaLOG) 8 Unit(s) SubCutaneous three times a day before meals  levalbuterol Inhalation 0.63 milliGRAM(s) Inhalation every 4 hours  methylPREDNISolone sodium succinate Injectable 40 milliGRAM(s) IV Push every 24 hours  multivitamin 1 Tablet(s) Oral daily  piperacillin/tazobactam IVPB.. 3.375 Gram(s) IV Intermittent every 8 hours  sertraline 50 milliGRAM(s) Oral daily  tobramycin for Nebulization 300 milliGRAM(s) Inhalation every 12 hours    MEDICATIONS  (PRN):  benzonatate 100 milliGRAM(s) Oral every 8 hours PRN Cough  dextrose 40% Gel 15 Gram(s) Oral once PRN Blood Glucose LESS THAN 70 milliGRAM(s)/deciliter  fluticasone propionate 50 MICROgram(s)/spray Nasal Spray 1 Spray(s) Both Nostrils two times a day PRN nasal congestion  glucagon  Injectable 1 milliGRAM(s) IntraMuscular once PRN Glucose LESS THAN 70 milligrams/deciliter  guaiFENesin   Syrup  (Sugar-Free) 200 milliGRAM(s) Oral every 6 hours PRN Cough  hydrocodone/homatropine Syrup 5 milliLiter(s) Oral at bedtime PRN Severe Cough  simethicone 80 milliGRAM(s) Chew every 12 hours PRN Gas      LABS:                    MICROBIOLOGY:     RADIOLOGY:  [ ] Reviewed and interpreted by me    PULMONARY FUNCTION TESTS:    EKG: CHIEF COMPLAINT: Patient is a 65y old  Female who presents with a chief complaint of copd exac (13 Aug 2020 07:46)      Interval Events: None overnight     REVIEW OF SYSTEMS: Done with daughter as interpretor per her request   Constitutional: Denies pain /fever chills   ENT: Denies   CV: Denies   Resp: + thick secretions, + [place]   GI: Denies   : Denies   MSK: Denies   Psychiatric: + anxiety on zoloft at home, takes prn   [ ] All other systems negative    OBJECTIVE:  ICU Vital Signs Last 24 Hrs  T(C): 36.6 (14 Aug 2020 05:01), Max: 36.7 (13 Aug 2020 14:23)  T(F): 97.8 (14 Aug 2020 05:01), Max: 98.1 (13 Aug 2020 22:17)  HR: 62 (14 Aug 2020 05:01) (62 - 88)  BP: 137/69 (14 Aug 2020 05:01) (136/76 - 144/63)  BP(mean): --  ABP: --  ABP(mean): --  RR: 18 (14 Aug 2020 05:01) (16 - 18)  SpO2: 98% (14 Aug 2020 05:01) (96% - 100%)        CAPILLARY BLOOD GLUCOSE      POCT Blood Glucose.: 119 mg/dL (13 Aug 2020 22:20)      HOSPITAL MEDICATIONS:  MEDICATIONS  (STANDING):  budesonide 160 MICROgram(s)/formoterol 4.5 MICROgram(s) Inhaler 2 Puff(s) Inhalation two times a day  chlorhexidine 4% Liquid 1 Application(s) Topical daily  dextrose 5%. 1000 milliLiter(s) (50 mL/Hr) IV Continuous <Continuous>  dextrose 50% Injectable 12.5 Gram(s) IV Push once  dextrose 50% Injectable 25 Gram(s) IV Push once  dextrose 50% Injectable 25 Gram(s) IV Push once  enoxaparin Injectable 40 milliGRAM(s) SubCutaneous daily  insulin glargine Injectable (LANTUS) 14 Unit(s) SubCutaneous at bedtime  insulin lispro (HumaLOG) corrective regimen sliding scale   SubCutaneous three times a day before meals  insulin lispro (HumaLOG) corrective regimen sliding scale   SubCutaneous at bedtime  insulin lispro Injectable (HumaLOG) 8 Unit(s) SubCutaneous three times a day before meals  levalbuterol Inhalation 0.63 milliGRAM(s) Inhalation every 4 hours  methylPREDNISolone sodium succinate Injectable 40 milliGRAM(s) IV Push every 24 hours  multivitamin 1 Tablet(s) Oral daily  piperacillin/tazobactam IVPB.. 3.375 Gram(s) IV Intermittent every 8 hours  sertraline 50 milliGRAM(s) Oral daily  tobramycin for Nebulization 300 milliGRAM(s) Inhalation every 12 hours    MEDICATIONS  (PRN):  benzonatate 100 milliGRAM(s) Oral every 8 hours PRN Cough  dextrose 40% Gel 15 Gram(s) Oral once PRN Blood Glucose LESS THAN 70 milliGRAM(s)/deciliter  fluticasone propionate 50 MICROgram(s)/spray Nasal Spray 1 Spray(s) Both Nostrils two times a day PRN nasal congestion  glucagon  Injectable 1 milliGRAM(s) IntraMuscular once PRN Glucose LESS THAN 70 milligrams/deciliter  guaiFENesin   Syrup  (Sugar-Free) 200 milliGRAM(s) Oral every 6 hours PRN Cough  hydrocodone/homatropine Syrup 5 milliLiter(s) Oral at bedtime PRN Severe Cough  simethicone 80 milliGRAM(s) Chew every 12 hours PRN Gas      LABS:                    MICROBIOLOGY:     RADIOLOGY:  [ ] Reviewed and interpreted by me    PULMONARY FUNCTION TESTS:    EKG:

## 2020-08-14 NOTE — PROGRESS NOTE ADULT - ASSESSMENT
64 YO Leandro speaking Female with PMHx of Diffuse Cystitic Bronchiectasis (Pseudomonas and Stenotrophmonas on previous cx) on intermittent on Prednisone, Primary Ciliary Dyskinesia, Allergic Bronchopulmonary Aspergillosis, HTN, DM2 A1C 8.7 (8/2020) p/w worsening productive cough, SOB, CP and intermittent fever over last 2 weeks. CXR unchanged with BL hazy opacities. Duonebs and Solumedrol given and admitted to Medicine. During admission, chest PT needed with NC trended up to 5L and transferred to [facility] on 8/11.     # Bronchiectasis Exacerbation 2/2 Pseudomonas PNA   - CXR with unchanged hazy opacities  - COVID negative and AB negative  - BCx NGTD. SCx (+) for Pseudomonas, pending sensitivities   - Continue Cough Syrup Q6H PRN. Hycodan at bedtime PRN.   - DC Cipro. Started Zosyn and Jerry 8/11   - Prednisone 40mg PO QD changed to Solumedrol 40mg IV QD for now.   - Duonebs (changed to Xopenex) Q4H. Symbicort BID.   - Monitor O2 and wean off as tolerated.   - IS and Acapella encouraged. Chest PT Q6H and Suction PRN.     # HTN  - Holding home Norvasc 5mg PO QD.   - Monitor BP and continue diet modifications for now.     # IDDM2 A1C 8.7 (8/2020)  - Continue on Lantus 14 U QHS and Lispro 8 U AC with moderate ISS ACHS.   - Monitor FS and adjust as needed while on steroids.     # DVT PPX with Lovenox  # DISPO - RCU. PT consulted. 64 YO Leandro speaking Female with PMHx of Diffuse Cystitic Bronchiectasis (Pseudomonas and Stenotrophmonas on previous cx) on intermittent on Prednisone, Primary Ciliary Dyskinesia, Allergic Bronchopulmonary Aspergillosis, HTN, DM2 A1C 8.7 (8/2020) p/w worsening productive cough, SOB, CP and intermittent fever over last 2 weeks. CXR unchanged with BL hazy opacities. Duonebs and Solumedrol given and admitted to Medicine. During admission, chest PT needed with NC trended up to 5L and transferred to R[facility] on 8/11.     # Bronchiectasis Exacerbation 2/2 Pseudomonas PNA   - CXR with unchanged hazy opacities  - COVID negative and AB negative  - BCx NGTD. SCx (+) for Pseudomonas, pending sensitivities   - Continue Cough Syrup Q6H PRN. Hycodan at bedtime PRN.   - DC Cipro. Started Zosyn and Jerry 8/11   - Prednisone 40mg PO QD changed to Solumedrol 40mg IV QD for now.   - Duonebs (changed to Xopenex) Q4H. Symbicort BID.   - Monitor O2 and wean off as tolerated.   - IS and Acapella encouraged. Chest PT Q6H and Suction PRN.   - 3% saline nebs   - Chest vest     # Allergic Bronchopulmonary Aspergillosis   - Fungal sputum cx   - Aspergillus precipitan   - Aspergillus IGg     # HTN  - Holding home Norvasc 5mg PO QD.   - Monitor BP and continue diet modifications for now.     # IDDM2 A1C 8.7 (8/2020)  - Continue on Lantus 14 U QHS and Lispro 8 U AC with moderate ISS ACHS.   - Monitor FS and adjust as needed while on steroids.     # DVT PPX with Lovenox  # DISPO - RCU. PT consulted. 64 YO Leandro speaking Female with PMHx of Diffuse Cystitic Bronchiectasis (Pseudomonas and Stenotrophmonas on previous cx) on intermittent on Prednisone, Primary Ciliary Dyskinesia, Allergic Bronchopulmonary Aspergillosis, HTN, DM2 A1C 8.7 (8/2020) p/w worsening productive cough, SOB, CP and intermittent fever over last 2 weeks. CXR unchanged with BL hazy opacities. Duonebs and Solumedrol given and admitted to Medicine. During admission, chest PT needed with NC trended up to 5L and transferred to [facility] on 8/11.     # Bronchiectasis Exacerbation 2/2 Pseudomonas PNA   - CXR with unchanged hazy opacities  - COVID negative and AB negative  - BCx NGTD. SCx (+) for Pseudomonas, pending sensitivities   - Continue Cough Syrup Q6H PRN. Hycodan at bedtime PRN.   - DC Cipro. Started Zosyn and Jerry 8/11   - Prednisone 40mg PO QD changed to Solumedrol 40mg IV QD for now.   - Duonebs (changed to Xopenex) Q4H. Symbicort BID.   - Monitor O2 and wean off as tolerated.   - IS and Acapella encouraged. Chest PT Q6H and Suction PRN.   - 3% saline nebs   - Chest vest   - DC zosyn - PA sensative to levaquin   - dc solumedrol and start prednisone 60 daily     # Allergic Bronchopulmonary Aspergillosis   - thick secretions   - Fungal sputum cx   - Aspergillus precipitan   - Aspergillus IGg     # HTN  - Holding home Norvasc 5mg PO QD.   - Monitor BP and continue diet modifications for now.     # IDDM2 A1C 8.7 (8/2020)  - Continue on Lantus 14 U QHS and Lispro 8 U AC with moderate ISS ACHS.   - Monitor FS and adjust as needed while on steroids.     # Anxiety  - Per pt/daughter has hx of anxiety and given zoloft by her primary care physician   - Not helpful and takes it prn not daily   - Will start Effexor       # DVT PPX with Lovenox  # DISPO - RCU. PT consulted.

## 2020-08-14 NOTE — CHART NOTE - NSCHARTNOTEFT_GEN_A_CORE
Notified by RN that patient complaining of throat swelling. Patient seen and examined at bedside. When writer entered room, patient in bathroom with hacking, productive cough. Patient prefers to call her daughter in law to help translate. Per daughter in law, patient states throat feels swollen and very dry. Denies SOB, dizziness, HA, CP. On exam, patients throat does not appear erythematous or edematous. Patient able to communicate in full sentences and no respiratory distress noted. Patient advised to take small sips of water and avoid very hot or cold drinks for now. Ordered Cepacol lozenge. Will continue to monitor patient.

## 2020-08-15 LAB
ALBUMIN SERPL ELPH-MCNC: 3.4 G/DL — SIGNIFICANT CHANGE UP (ref 3.3–5)
ALP SERPL-CCNC: 123 U/L — HIGH (ref 40–120)
ALT FLD-CCNC: 62 U/L — HIGH (ref 4–33)
ANION GAP SERPL CALC-SCNC: 10 MMO/L — SIGNIFICANT CHANGE UP (ref 7–14)
AST SERPL-CCNC: 50 U/L — HIGH (ref 4–32)
BASOPHILS # BLD AUTO: 0.04 K/UL — SIGNIFICANT CHANGE UP (ref 0–0.2)
BASOPHILS NFR BLD AUTO: 0.4 % — SIGNIFICANT CHANGE UP (ref 0–2)
BILIRUB SERPL-MCNC: 0.3 MG/DL — SIGNIFICANT CHANGE UP (ref 0.2–1.2)
BUN SERPL-MCNC: 19 MG/DL — SIGNIFICANT CHANGE UP (ref 7–23)
CALCIUM SERPL-MCNC: 9.3 MG/DL — SIGNIFICANT CHANGE UP (ref 8.4–10.5)
CHLORIDE SERPL-SCNC: 97 MMOL/L — LOW (ref 98–107)
CO2 SERPL-SCNC: 30 MMOL/L — SIGNIFICANT CHANGE UP (ref 22–31)
CREAT SERPL-MCNC: 0.44 MG/DL — LOW (ref 0.5–1.3)
EOSINOPHIL # BLD AUTO: 0.34 K/UL — SIGNIFICANT CHANGE UP (ref 0–0.5)
EOSINOPHIL NFR BLD AUTO: 3.7 % — SIGNIFICANT CHANGE UP (ref 0–6)
GLUCOSE BLDC GLUCOMTR-MCNC: 181 MG/DL — HIGH (ref 70–99)
GLUCOSE BLDC GLUCOMTR-MCNC: 196 MG/DL — HIGH (ref 70–99)
GLUCOSE BLDC GLUCOMTR-MCNC: 204 MG/DL — HIGH (ref 70–99)
GLUCOSE BLDC GLUCOMTR-MCNC: 90 MG/DL — SIGNIFICANT CHANGE UP (ref 70–99)
GLUCOSE SERPL-MCNC: 99 MG/DL — SIGNIFICANT CHANGE UP (ref 70–99)
HCT VFR BLD CALC: 44.1 % — SIGNIFICANT CHANGE UP (ref 34.5–45)
HGB BLD-MCNC: 13.8 G/DL — SIGNIFICANT CHANGE UP (ref 11.5–15.5)
IGA FLD-MCNC: 391 MG/DL — SIGNIFICANT CHANGE UP (ref 70–400)
IGG FLD-MCNC: 1405 MG/DL — SIGNIFICANT CHANGE UP (ref 700–1600)
IGM SERPL-MCNC: 99 MG/DL — SIGNIFICANT CHANGE UP (ref 40–230)
IMM GRANULOCYTES NFR BLD AUTO: 0.5 % — SIGNIFICANT CHANGE UP (ref 0–1.5)
LYMPHOCYTES # BLD AUTO: 2.13 K/UL — SIGNIFICANT CHANGE UP (ref 1–3.3)
LYMPHOCYTES # BLD AUTO: 23 % — SIGNIFICANT CHANGE UP (ref 13–44)
MAGNESIUM SERPL-MCNC: 2 MG/DL — SIGNIFICANT CHANGE UP (ref 1.6–2.6)
MCHC RBC-ENTMCNC: 27.3 PG — SIGNIFICANT CHANGE UP (ref 27–34)
MCHC RBC-ENTMCNC: 31.3 % — LOW (ref 32–36)
MCV RBC AUTO: 87.2 FL — SIGNIFICANT CHANGE UP (ref 80–100)
MONOCYTES # BLD AUTO: 0.83 K/UL — SIGNIFICANT CHANGE UP (ref 0–0.9)
MONOCYTES NFR BLD AUTO: 9 % — SIGNIFICANT CHANGE UP (ref 2–14)
NEUTROPHILS # BLD AUTO: 5.86 K/UL — SIGNIFICANT CHANGE UP (ref 1.8–7.4)
NEUTROPHILS NFR BLD AUTO: 63.4 % — SIGNIFICANT CHANGE UP (ref 43–77)
NRBC # FLD: 0 K/UL — SIGNIFICANT CHANGE UP (ref 0–0)
PHOSPHATE SERPL-MCNC: 3.2 MG/DL — SIGNIFICANT CHANGE UP (ref 2.5–4.5)
PLATELET # BLD AUTO: 158 K/UL — SIGNIFICANT CHANGE UP (ref 150–400)
PMV BLD: 11.2 FL — SIGNIFICANT CHANGE UP (ref 7–13)
POTASSIUM SERPL-MCNC: 3.7 MMOL/L — SIGNIFICANT CHANGE UP (ref 3.5–5.3)
POTASSIUM SERPL-SCNC: 3.7 MMOL/L — SIGNIFICANT CHANGE UP (ref 3.5–5.3)
PROT SERPL-MCNC: 6.4 G/DL — SIGNIFICANT CHANGE UP (ref 6–8.3)
RBC # BLD: 5.06 M/UL — SIGNIFICANT CHANGE UP (ref 3.8–5.2)
RBC # FLD: 13.6 % — SIGNIFICANT CHANGE UP (ref 10.3–14.5)
SODIUM SERPL-SCNC: 137 MMOL/L — SIGNIFICANT CHANGE UP (ref 135–145)
WBC # BLD: 9.25 K/UL — SIGNIFICANT CHANGE UP (ref 3.8–10.5)
WBC # FLD AUTO: 9.25 K/UL — SIGNIFICANT CHANGE UP (ref 3.8–10.5)

## 2020-08-15 PROCEDURE — 99233 SBSQ HOSP IP/OBS HIGH 50: CPT | Mod: GC

## 2020-08-15 RX ADMIN — Medication 300 MILLIGRAM(S): at 22:45

## 2020-08-15 RX ADMIN — Medication 37.5 MILLIGRAM(S): at 11:53

## 2020-08-15 RX ADMIN — BUDESONIDE AND FORMOTEROL FUMARATE DIHYDRATE 2 PUFF(S): 160; 4.5 AEROSOL RESPIRATORY (INHALATION) at 09:43

## 2020-08-15 RX ADMIN — LEVALBUTEROL 0.63 MILLIGRAM(S): 1.25 SOLUTION, CONCENTRATE RESPIRATORY (INHALATION) at 22:31

## 2020-08-15 RX ADMIN — Medication 60 MILLIGRAM(S): at 05:45

## 2020-08-15 RX ADMIN — Medication 1 TABLET(S): at 11:52

## 2020-08-15 RX ADMIN — LEVALBUTEROL 0.63 MILLIGRAM(S): 1.25 SOLUTION, CONCENTRATE RESPIRATORY (INHALATION) at 01:00

## 2020-08-15 RX ADMIN — Medication 4: at 07:59

## 2020-08-15 RX ADMIN — CHLORHEXIDINE GLUCONATE 1 APPLICATION(S): 213 SOLUTION TOPICAL at 11:52

## 2020-08-15 RX ADMIN — LEVALBUTEROL 0.63 MILLIGRAM(S): 1.25 SOLUTION, CONCENTRATE RESPIRATORY (INHALATION) at 13:25

## 2020-08-15 RX ADMIN — BUDESONIDE AND FORMOTEROL FUMARATE DIHYDRATE 2 PUFF(S): 160; 4.5 AEROSOL RESPIRATORY (INHALATION) at 22:57

## 2020-08-15 RX ADMIN — INSULIN GLARGINE 14 UNIT(S): 100 INJECTION, SOLUTION SUBCUTANEOUS at 21:11

## 2020-08-15 RX ADMIN — SODIUM CHLORIDE 3 MILLILITER(S): 9 INJECTION INTRAMUSCULAR; INTRAVENOUS; SUBCUTANEOUS at 09:42

## 2020-08-15 RX ADMIN — SODIUM CHLORIDE 3 MILLILITER(S): 9 INJECTION INTRAMUSCULAR; INTRAVENOUS; SUBCUTANEOUS at 04:20

## 2020-08-15 RX ADMIN — SODIUM CHLORIDE 3 MILLILITER(S): 9 INJECTION INTRAMUSCULAR; INTRAVENOUS; SUBCUTANEOUS at 22:38

## 2020-08-15 RX ADMIN — LEVALBUTEROL 0.63 MILLIGRAM(S): 1.25 SOLUTION, CONCENTRATE RESPIRATORY (INHALATION) at 16:51

## 2020-08-15 RX ADMIN — LEVALBUTEROL 0.63 MILLIGRAM(S): 1.25 SOLUTION, CONCENTRATE RESPIRATORY (INHALATION) at 04:18

## 2020-08-15 RX ADMIN — Medication 2: at 11:48

## 2020-08-15 RX ADMIN — Medication 8 UNIT(S): at 07:59

## 2020-08-15 RX ADMIN — ENOXAPARIN SODIUM 40 MILLIGRAM(S): 100 INJECTION SUBCUTANEOUS at 11:48

## 2020-08-15 RX ADMIN — BENZOCAINE AND MENTHOL 1 LOZENGE: 5; 1 LIQUID ORAL at 23:38

## 2020-08-15 RX ADMIN — Medication 8 UNIT(S): at 11:47

## 2020-08-15 RX ADMIN — Medication 8 UNIT(S): at 17:10

## 2020-08-15 RX ADMIN — Medication 300 MILLIGRAM(S): at 09:42

## 2020-08-15 RX ADMIN — Medication 2: at 17:10

## 2020-08-15 RX ADMIN — SODIUM CHLORIDE 3 MILLILITER(S): 9 INJECTION INTRAMUSCULAR; INTRAVENOUS; SUBCUTANEOUS at 16:51

## 2020-08-15 RX ADMIN — LEVALBUTEROL 0.63 MILLIGRAM(S): 1.25 SOLUTION, CONCENTRATE RESPIRATORY (INHALATION) at 09:42

## 2020-08-15 NOTE — PROGRESS NOTE ADULT - ATTENDING COMMENTS
t with PCD, chronic pseudomonal respiratory infection, hx of ABPA. diffuse wheezing today and thick sputum.   ordered ABPA workup.  on airway clearance therapy with Alb neb, HS 3%, vest BID  on  prednisone 60 mg daily (pt was recently on 40 mg at home)  continue Iinhaled tobramycin; will d/w outpatient pulmonary practice to order neb tobramycin for QOM maintenance therapy.   continue 14 days of anti-pseudomonal coverage, with  PO levaquin (PA is sensitive on most recent sputum cx).  has poorly controlled generalized anxiety - start effexor.

## 2020-08-15 NOTE — PROGRESS NOTE ADULT - SUBJECTIVE AND OBJECTIVE BOX
CHIEF COMPLAINT: Patient is a 65y old  Female who presents with a chief complaint of copd exac (14 Aug 2020 07:24)      Interval Events: none overnight     REVIEW OF SYSTEMS:  Constitutional:   Eyes:  ENT:  CV:  Resp:  GI:  :  MSK:  Integumentary:  Neurological:  Psychiatric:  [x ] All other systems negative      OBJECTIVE:  ICU Vital Signs Last 24 Hrs  T(C): 36.7 (15 Aug 2020 05:43), Max: 36.9 (14 Aug 2020 13:00)  T(F): 98 (15 Aug 2020 05:43), Max: 98.4 (14 Aug 2020 13:00)  HR: 79 (15 Aug 2020 05:43) (71 - 88)  BP: 141/84 (15 Aug 2020 05:43) (138/70 - 147/76)  BP(mean): --  ABP: --  ABP(mean): --  RR: 18 (15 Aug 2020 05:43) (17 - 18)  SpO2: 97% (15 Aug 2020 05:43) (97% - 100%)        CAPILLARY BLOOD GLUCOSE      POCT Blood Glucose.: 204 mg/dL (15 Aug 2020 07:48)        HOSPITAL MEDICATIONS:  MEDICATIONS  (STANDING):  benzocaine 15 mG/menthol 3.6 mG (Sugar-Free) Lozenge 1 Lozenge Oral once  budesonide 160 MICROgram(s)/formoterol 4.5 MICROgram(s) Inhaler 2 Puff(s) Inhalation two times a day  chlorhexidine 4% Liquid 1 Application(s) Topical daily  dextrose 5%. 1000 milliLiter(s) (50 mL/Hr) IV Continuous <Continuous>  dextrose 50% Injectable 12.5 Gram(s) IV Push once  dextrose 50% Injectable 25 Gram(s) IV Push once  dextrose 50% Injectable 25 Gram(s) IV Push once  enoxaparin Injectable 40 milliGRAM(s) SubCutaneous daily  insulin glargine Injectable (LANTUS) 14 Unit(s) SubCutaneous at bedtime  insulin lispro (HumaLOG) corrective regimen sliding scale   SubCutaneous three times a day before meals  insulin lispro (HumaLOG) corrective regimen sliding scale   SubCutaneous at bedtime  insulin lispro Injectable (HumaLOG) 8 Unit(s) SubCutaneous three times a day before meals  levalbuterol Inhalation 0.63 milliGRAM(s) Inhalation every 4 hours  levoFLOXacin  Tablet 750 milliGRAM(s) Oral every 24 hours  multivitamin 1 Tablet(s) Oral daily  predniSONE   Tablet 60 milliGRAM(s) Oral daily  sodium chloride 3%  Inhalation 3 milliLiter(s) Inhalation every 6 hours  tobramycin for Nebulization 300 milliGRAM(s) Inhalation every 12 hours  venlafaxine XR. 37.5 milliGRAM(s) Oral daily    MEDICATIONS  (PRN):  benzonatate 100 milliGRAM(s) Oral every 8 hours PRN Cough  dextrose 40% Gel 15 Gram(s) Oral once PRN Blood Glucose LESS THAN 70 milliGRAM(s)/deciliter  fluticasone propionate 50 MICROgram(s)/spray Nasal Spray 1 Spray(s) Both Nostrils two times a day PRN nasal congestion  glucagon  Injectable 1 milliGRAM(s) IntraMuscular once PRN Glucose LESS THAN 70 milligrams/deciliter  guaiFENesin   Syrup  (Sugar-Free) 200 milliGRAM(s) Oral every 6 hours PRN Cough  hydrocodone/homatropine Syrup 5 milliLiter(s) Oral at bedtime PRN Severe Cough  simethicone 80 milliGRAM(s) Chew every 12 hours PRN Gas      LABS:                        13.8   9.25  )-----------( 158      ( 15 Aug 2020 05:30 )             44.1     08-15    137  |  97<L>  |  19  ----------------------------<  99  3.7   |  30  |  0.44<L>    Ca    9.3      15 Aug 2020 05:30  Phos  3.2     08-15  Mg     2.0     08-15    TPro  6.4  /  Alb  3.4  /  TBili  0.3  /  DBili  x   /  AST  50<H>  /  ALT  62<H>  /  AlkPhos  123<H>  08-15              MICROBIOLOGY:     RADIOLOGY:  [ ] Reviewed and interpreted by me    PULMONARY FUNCTION TESTS:    EKG: CHIEF COMPLAINT: Patient is a 65y old  Female who presents with a chief complaint of copd exac (14 Aug 2020 07:24)      Interval Events: none overnight     REVIEW OF SYSTEMS:  Constitutional: Denies pain   CV: Denies   Resp: Denies   [x ] All other systems negative      OBJECTIVE:  ICU Vital Signs Last 24 Hrs  T(C): 36.7 (15 Aug 2020 05:43), Max: 36.9 (14 Aug 2020 13:00)  T(F): 98 (15 Aug 2020 05:43), Max: 98.4 (14 Aug 2020 13:00)  HR: 79 (15 Aug 2020 05:43) (71 - 88)  BP: 141/84 (15 Aug 2020 05:43) (138/70 - 147/76)  BP(mean): --  ABP: --  ABP(mean): --  RR: 18 (15 Aug 2020 05:43) (17 - 18)  SpO2: 97% (15 Aug 2020 05:43) (97% - 100%)        CAPILLARY BLOOD GLUCOSE      POCT Blood Glucose.: 204 mg/dL (15 Aug 2020 07:48)        HOSPITAL MEDICATIONS:  MEDICATIONS  (STANDING):  benzocaine 15 mG/menthol 3.6 mG (Sugar-Free) Lozenge 1 Lozenge Oral once  budesonide 160 MICROgram(s)/formoterol 4.5 MICROgram(s) Inhaler 2 Puff(s) Inhalation two times a day  chlorhexidine 4% Liquid 1 Application(s) Topical daily  dextrose 5%. 1000 milliLiter(s) (50 mL/Hr) IV Continuous <Continuous>  dextrose 50% Injectable 12.5 Gram(s) IV Push once  dextrose 50% Injectable 25 Gram(s) IV Push once  dextrose 50% Injectable 25 Gram(s) IV Push once  enoxaparin Injectable 40 milliGRAM(s) SubCutaneous daily  insulin glargine Injectable (LANTUS) 14 Unit(s) SubCutaneous at bedtime  insulin lispro (HumaLOG) corrective regimen sliding scale   SubCutaneous three times a day before meals  insulin lispro (HumaLOG) corrective regimen sliding scale   SubCutaneous at bedtime  insulin lispro Injectable (HumaLOG) 8 Unit(s) SubCutaneous three times a day before meals  levalbuterol Inhalation 0.63 milliGRAM(s) Inhalation every 4 hours  levoFLOXacin  Tablet 750 milliGRAM(s) Oral every 24 hours  multivitamin 1 Tablet(s) Oral daily  predniSONE   Tablet 60 milliGRAM(s) Oral daily  sodium chloride 3%  Inhalation 3 milliLiter(s) Inhalation every 6 hours  tobramycin for Nebulization 300 milliGRAM(s) Inhalation every 12 hours  venlafaxine XR. 37.5 milliGRAM(s) Oral daily    MEDICATIONS  (PRN):  benzonatate 100 milliGRAM(s) Oral every 8 hours PRN Cough  dextrose 40% Gel 15 Gram(s) Oral once PRN Blood Glucose LESS THAN 70 milliGRAM(s)/deciliter  fluticasone propionate 50 MICROgram(s)/spray Nasal Spray 1 Spray(s) Both Nostrils two times a day PRN nasal congestion  glucagon  Injectable 1 milliGRAM(s) IntraMuscular once PRN Glucose LESS THAN 70 milligrams/deciliter  guaiFENesin   Syrup  (Sugar-Free) 200 milliGRAM(s) Oral every 6 hours PRN Cough  hydrocodone/homatropine Syrup 5 milliLiter(s) Oral at bedtime PRN Severe Cough  simethicone 80 milliGRAM(s) Chew every 12 hours PRN Gas      LABS:                        13.8   9.25  )-----------( 158      ( 15 Aug 2020 05:30 )             44.1     08-15    137  |  97<L>  |  19  ----------------------------<  99  3.7   |  30  |  0.44<L>    Ca    9.3      15 Aug 2020 05:30  Phos  3.2     08-15  Mg     2.0     08-15    TPro  6.4  /  Alb  3.4  /  TBili  0.3  /  DBili  x   /  AST  50<H>  /  ALT  62<H>  /  AlkPhos  123<H>  08-15              MICROBIOLOGY:     RADIOLOGY:  [ ] Reviewed and interpreted by me    PULMONARY FUNCTION TESTS:    EKG:

## 2020-08-15 NOTE — PROGRESS NOTE ADULT - ASSESSMENT
64 YO Leandro speaking Female with PMHx of Diffuse Cystitic Bronchiectasis (Pseudomonas and Stenotrophmonas on previous cx) on intermittent on Prednisone, Primary Ciliary Dyskinesia, Allergic Bronchopulmonary Aspergillosis, HTN, DM2 A1C 8.7 (8/2020) p/w worsening productive cough, SOB, CP and intermittent fever over last 2 weeks. CXR unchanged with BL hazy opacities. Duonebs and Solumedrol given and admitted to Medicine. During admission, chest PT needed with NC trended up to 5L and transferred to [facility] on 8/11.     # Bronchiectasis Exacerbation 2/2 Pseudomonas PNA   - CXR with unchanged hazy opacities  - COVID negative and AB negative  - BCx NGTD. SCx (+) for Pseudomonas, pending sensitivities   - Continue Cough Syrup Q6H PRN. Hycodan at bedtime PRN.   - DC Cipro. Started Zosyn and Jerry 8/11   - Prednisone 40mg PO QD changed to Solumedrol 40mg IV QD for now.   - Duonebs (changed to Xopenex) Q4H. Symbicort BID.   - Monitor O2 and wean off as tolerated.   - IS and Acapella encouraged. Chest PT Q6H and Suction PRN.   - 3% saline nebs   - Chest vest   - DC zosyn - PA sensative to levaquin   - dc solumedrol and start prednisone 60 daily     # Allergic Bronchopulmonary Aspergillosis   - thick secretions   - Fungal sputum cx   - Aspergillus precipitan   - Aspergillus IGg     # HTN  - Holding home Norvasc 5mg PO QD.   - Monitor BP and continue diet modifications for now.     # IDDM2 A1C 8.7 (8/2020)  - Continue on Lantus 14 U QHS and Lispro 8 U AC with moderate ISS ACHS.   - Monitor FS and adjust as needed while on steroids.     # Anxiety  - Per pt/daughter has hx of anxiety and given zoloft by her primary care physician   - Not helpful and takes it prn not daily   - Will start Effexor       # DVT PPX with Lovenox  # DISPO - RCU. PT consulted. 64 YO Leandro speaking Female with PMHx of Diffuse Cystitic Bronchiectasis (Pseudomonas and Stenotrophmonas on previous cx) on intermittent on Prednisone, Primary Ciliary Dyskinesia, Allergic Bronchopulmonary Aspergillosis, HTN, DM2 A1C 8.7 (8/2020) p/w worsening productive cough, SOB, CP and intermittent fever over last 2 weeks. CXR unchanged with BL hazy opacities. Duonebs and Solumedrol given and admitted to Medicine. During admission, chest PT needed with NC trended up to 5L and transferred to [facility] on 8/11.     # Bronchiectasis Exacerbation 2/2 Pseudomonas PNA   - CXR with unchanged hazy opacities  - COVID negative and AB negative  - BCx NGTD. SCx (+) for Pseudomonas, pending sensitivities   - Continue Cough Syrup Q6H PRN. Hycodan at bedtime PRN.   - DC Cipro. Started Zosyn and Jerry 8/11   - Prednisone 40mg PO QD changed to Solumedrol 40mg IV QD for now.   - Duonebs (changed to Xopenex) Q4H. Symbicort BID.   - Monitor O2 and wean off as tolerated.   - IS and Acapella encouraged. Chest PT Q6H and Suction PRN.   - 3% saline nebs   - Chest vest   - DC zosyn - PA sensative to levaquin   - dc solumedrol and start prednisone 60 daily     # Allergic Bronchopulmonary Aspergillosis   - thick secretions   - Fungal sputum cx   - Aspergillus precipitan   - Aspergillus IGg   - Serum immunoglobulin    # HTN  - Holding home Norvasc 5mg PO QD.   - Monitor BP and continue diet modifications for now.     # IDDM2 A1C 8.7 (8/2020)  - Continue on Lantus 14 U QHS and Lispro 8 U AC with moderate ISS ACHS.   - Monitor FS and adjust as needed while on steroids.     # Anxiety  - Per pt/daughter has hx of anxiety and given zoloft by her primary care physician   - Not helpful and takes it prn not daily   - Will start Effexor       # DVT PPX with Lovenox  # DISPO - RCU. PT consulted.

## 2020-08-16 LAB
ALBUMIN SERPL ELPH-MCNC: 3.2 G/DL — LOW (ref 3.3–5)
ALP SERPL-CCNC: 138 U/L — HIGH (ref 40–120)
ALT FLD-CCNC: 79 U/L — HIGH (ref 4–33)
ANION GAP SERPL CALC-SCNC: 12 MMO/L — SIGNIFICANT CHANGE UP (ref 7–14)
AST SERPL-CCNC: 68 U/L — HIGH (ref 4–32)
BASOPHILS # BLD AUTO: 0.08 K/UL — SIGNIFICANT CHANGE UP (ref 0–0.2)
BASOPHILS NFR BLD AUTO: 1 % — SIGNIFICANT CHANGE UP (ref 0–2)
BILIRUB SERPL-MCNC: 0.3 MG/DL — SIGNIFICANT CHANGE UP (ref 0.2–1.2)
BUN SERPL-MCNC: 24 MG/DL — HIGH (ref 7–23)
CALCIUM SERPL-MCNC: 9.2 MG/DL — SIGNIFICANT CHANGE UP (ref 8.4–10.5)
CHLORIDE SERPL-SCNC: 97 MMOL/L — LOW (ref 98–107)
CO2 SERPL-SCNC: 26 MMOL/L — SIGNIFICANT CHANGE UP (ref 22–31)
CREAT SERPL-MCNC: 0.51 MG/DL — SIGNIFICANT CHANGE UP (ref 0.5–1.3)
CULTURE RESULTS: SIGNIFICANT CHANGE UP
CULTURE RESULTS: SIGNIFICANT CHANGE UP
EOSINOPHIL # BLD AUTO: 0.29 K/UL — SIGNIFICANT CHANGE UP (ref 0–0.5)
EOSINOPHIL NFR BLD AUTO: 3.6 % — SIGNIFICANT CHANGE UP (ref 0–6)
GLUCOSE BLDC GLUCOMTR-MCNC: 165 MG/DL — HIGH (ref 70–99)
GLUCOSE BLDC GLUCOMTR-MCNC: 175 MG/DL — HIGH (ref 70–99)
GLUCOSE BLDC GLUCOMTR-MCNC: 225 MG/DL — HIGH (ref 70–99)
GLUCOSE BLDC GLUCOMTR-MCNC: 264 MG/DL — HIGH (ref 70–99)
GLUCOSE SERPL-MCNC: 167 MG/DL — HIGH (ref 70–99)
HCT VFR BLD CALC: 45.4 % — HIGH (ref 34.5–45)
HGB BLD-MCNC: 13.5 G/DL — SIGNIFICANT CHANGE UP (ref 11.5–15.5)
IMM GRANULOCYTES NFR BLD AUTO: 0.7 % — SIGNIFICANT CHANGE UP (ref 0–1.5)
LYMPHOCYTES # BLD AUTO: 2.3 K/UL — SIGNIFICANT CHANGE UP (ref 1–3.3)
LYMPHOCYTES # BLD AUTO: 28.2 % — SIGNIFICANT CHANGE UP (ref 13–44)
MAGNESIUM SERPL-MCNC: 2.1 MG/DL — SIGNIFICANT CHANGE UP (ref 1.6–2.6)
MCHC RBC-ENTMCNC: 27.3 PG — SIGNIFICANT CHANGE UP (ref 27–34)
MCHC RBC-ENTMCNC: 29.7 % — LOW (ref 32–36)
MCV RBC AUTO: 91.7 FL — SIGNIFICANT CHANGE UP (ref 80–100)
MONOCYTES # BLD AUTO: 0.78 K/UL — SIGNIFICANT CHANGE UP (ref 0–0.9)
MONOCYTES NFR BLD AUTO: 9.6 % — SIGNIFICANT CHANGE UP (ref 2–14)
NEUTROPHILS # BLD AUTO: 4.65 K/UL — SIGNIFICANT CHANGE UP (ref 1.8–7.4)
NEUTROPHILS NFR BLD AUTO: 56.9 % — SIGNIFICANT CHANGE UP (ref 43–77)
NRBC # FLD: 0 K/UL — SIGNIFICANT CHANGE UP (ref 0–0)
PHOSPHATE SERPL-MCNC: 3.3 MG/DL — SIGNIFICANT CHANGE UP (ref 2.5–4.5)
PLATELET # BLD AUTO: 151 K/UL — SIGNIFICANT CHANGE UP (ref 150–400)
PMV BLD: 11.4 FL — SIGNIFICANT CHANGE UP (ref 7–13)
POTASSIUM SERPL-MCNC: 4.4 MMOL/L — SIGNIFICANT CHANGE UP (ref 3.5–5.3)
POTASSIUM SERPL-SCNC: 4.4 MMOL/L — SIGNIFICANT CHANGE UP (ref 3.5–5.3)
PROT SERPL-MCNC: 6.2 G/DL — SIGNIFICANT CHANGE UP (ref 6–8.3)
RBC # BLD: 4.95 M/UL — SIGNIFICANT CHANGE UP (ref 3.8–5.2)
RBC # FLD: 13.6 % — SIGNIFICANT CHANGE UP (ref 10.3–14.5)
SODIUM SERPL-SCNC: 135 MMOL/L — SIGNIFICANT CHANGE UP (ref 135–145)
SPECIMEN SOURCE: SIGNIFICANT CHANGE UP
SPECIMEN SOURCE: SIGNIFICANT CHANGE UP
WBC # BLD: 8.16 K/UL — SIGNIFICANT CHANGE UP (ref 3.8–10.5)
WBC # FLD AUTO: 8.16 K/UL — SIGNIFICANT CHANGE UP (ref 3.8–10.5)

## 2020-08-16 PROCEDURE — 99233 SBSQ HOSP IP/OBS HIGH 50: CPT | Mod: GC

## 2020-08-16 RX ORDER — BENZOCAINE AND MENTHOL 5; 1 G/100ML; G/100ML
1 LIQUID ORAL EVERY 4 HOURS
Refills: 0 | Status: DISCONTINUED | OUTPATIENT
Start: 2020-08-16 | End: 2020-08-19

## 2020-08-16 RX ADMIN — BUDESONIDE AND FORMOTEROL FUMARATE DIHYDRATE 2 PUFF(S): 160; 4.5 AEROSOL RESPIRATORY (INHALATION) at 22:47

## 2020-08-16 RX ADMIN — BENZOCAINE AND MENTHOL 1 LOZENGE: 5; 1 LIQUID ORAL at 22:08

## 2020-08-16 RX ADMIN — LEVALBUTEROL 0.63 MILLIGRAM(S): 1.25 SOLUTION, CONCENTRATE RESPIRATORY (INHALATION) at 13:53

## 2020-08-16 RX ADMIN — SODIUM CHLORIDE 3 MILLILITER(S): 9 INJECTION INTRAMUSCULAR; INTRAVENOUS; SUBCUTANEOUS at 16:34

## 2020-08-16 RX ADMIN — CHLORHEXIDINE GLUCONATE 1 APPLICATION(S): 213 SOLUTION TOPICAL at 11:12

## 2020-08-16 RX ADMIN — LEVALBUTEROL 0.63 MILLIGRAM(S): 1.25 SOLUTION, CONCENTRATE RESPIRATORY (INHALATION) at 08:26

## 2020-08-16 RX ADMIN — Medication 4: at 16:54

## 2020-08-16 RX ADMIN — ENOXAPARIN SODIUM 40 MILLIGRAM(S): 100 INJECTION SUBCUTANEOUS at 11:11

## 2020-08-16 RX ADMIN — Medication 37.5 MILLIGRAM(S): at 11:12

## 2020-08-16 RX ADMIN — Medication 60 MILLIGRAM(S): at 05:48

## 2020-08-16 RX ADMIN — Medication 8 UNIT(S): at 07:59

## 2020-08-16 RX ADMIN — Medication 6: at 07:59

## 2020-08-16 RX ADMIN — BUDESONIDE AND FORMOTEROL FUMARATE DIHYDRATE 2 PUFF(S): 160; 4.5 AEROSOL RESPIRATORY (INHALATION) at 08:27

## 2020-08-16 RX ADMIN — Medication 2: at 11:42

## 2020-08-16 RX ADMIN — LEVALBUTEROL 0.63 MILLIGRAM(S): 1.25 SOLUTION, CONCENTRATE RESPIRATORY (INHALATION) at 22:09

## 2020-08-16 RX ADMIN — Medication 1 TABLET(S): at 11:11

## 2020-08-16 RX ADMIN — Medication 300 MILLIGRAM(S): at 08:28

## 2020-08-16 RX ADMIN — Medication 300 MILLIGRAM(S): at 22:36

## 2020-08-16 RX ADMIN — SODIUM CHLORIDE 3 MILLILITER(S): 9 INJECTION INTRAMUSCULAR; INTRAVENOUS; SUBCUTANEOUS at 22:20

## 2020-08-16 RX ADMIN — Medication 8 UNIT(S): at 11:43

## 2020-08-16 RX ADMIN — SODIUM CHLORIDE 3 MILLILITER(S): 9 INJECTION INTRAMUSCULAR; INTRAVENOUS; SUBCUTANEOUS at 08:29

## 2020-08-16 RX ADMIN — Medication 8 UNIT(S): at 16:54

## 2020-08-16 RX ADMIN — LEVALBUTEROL 0.63 MILLIGRAM(S): 1.25 SOLUTION, CONCENTRATE RESPIRATORY (INHALATION) at 16:35

## 2020-08-16 RX ADMIN — INSULIN GLARGINE 14 UNIT(S): 100 INJECTION, SOLUTION SUBCUTANEOUS at 22:08

## 2020-08-16 NOTE — PROGRESS NOTE ADULT - SUBJECTIVE AND OBJECTIVE BOX
CHIEF COMPLAINT: Patient is a 65y old Female who presents with a chief complaint of copd exac.    Interval Events:    REVIEW OF SYSTEMS:  [ ] All other systems negative  [ ] Unable to assess ROS because ________    OBJECTIVE:  ICU Vital Signs Last 24 Hrs  T(C): 36.8 (16 Aug 2020 05:47), Max: 36.8 (16 Aug 2020 05:47)  T(F): 98.2 (16 Aug 2020 05:47), Max: 98.2 (16 Aug 2020 05:47)  HR: 81 (16 Aug 2020 05:47) (78 - 88)  BP: 128/77 (16 Aug 2020 05:47) (128/77 - 138/81)  BP(mean): --  ABP: --  ABP(mean): --  RR: 17 (16 Aug 2020 05:47) (17 - 18)  SpO2: 97% (16 Aug 2020 05:47) (97% - 99%)        CAPILLARY BLOOD GLUCOSE      POCT Blood Glucose.: 90 mg/dL (15 Aug 2020 21:08)      HOSPITAL MEDICATIONS:  MEDICATIONS  (STANDING):  budesonide 160 MICROgram(s)/formoterol 4.5 MICROgram(s) Inhaler 2 Puff(s) Inhalation two times a day  chlorhexidine 4% Liquid 1 Application(s) Topical daily  dextrose 5%. 1000 milliLiter(s) (50 mL/Hr) IV Continuous <Continuous>  dextrose 50% Injectable 12.5 Gram(s) IV Push once  dextrose 50% Injectable 25 Gram(s) IV Push once  dextrose 50% Injectable 25 Gram(s) IV Push once  enoxaparin Injectable 40 milliGRAM(s) SubCutaneous daily  insulin glargine Injectable (LANTUS) 14 Unit(s) SubCutaneous at bedtime  insulin lispro (HumaLOG) corrective regimen sliding scale   SubCutaneous three times a day before meals  insulin lispro (HumaLOG) corrective regimen sliding scale   SubCutaneous at bedtime  insulin lispro Injectable (HumaLOG) 8 Unit(s) SubCutaneous three times a day before meals  levalbuterol Inhalation 0.63 milliGRAM(s) Inhalation every 4 hours  levoFLOXacin  Tablet 750 milliGRAM(s) Oral every 24 hours  multivitamin 1 Tablet(s) Oral daily  predniSONE   Tablet 60 milliGRAM(s) Oral daily  sodium chloride 3%  Inhalation 3 milliLiter(s) Inhalation every 6 hours  tobramycin for Nebulization 300 milliGRAM(s) Inhalation every 12 hours  venlafaxine XR. 37.5 milliGRAM(s) Oral daily    MEDICATIONS  (PRN):  benzonatate 100 milliGRAM(s) Oral every 8 hours PRN Cough  dextrose 40% Gel 15 Gram(s) Oral once PRN Blood Glucose LESS THAN 70 milliGRAM(s)/deciliter  fluticasone propionate 50 MICROgram(s)/spray Nasal Spray 1 Spray(s) Both Nostrils two times a day PRN nasal congestion  glucagon  Injectable 1 milliGRAM(s) IntraMuscular once PRN Glucose LESS THAN 70 milligrams/deciliter  guaiFENesin   Syrup  (Sugar-Free) 200 milliGRAM(s) Oral every 6 hours PRN Cough  hydrocodone/homatropine Syrup 5 milliLiter(s) Oral at bedtime PRN Severe Cough  simethicone 80 milliGRAM(s) Chew every 12 hours PRN Gas      LABS:                        MICROBIOLOGY:     RADIOLOGY:  [ ] Reviewed and interpreted by me    PULMONARY FUNCTION TESTS:    EKG: CHIEF COMPLAINT: Patient is a 65y old Female who presents with a chief complaint of copd exac.    REVIEW OF SYSTEMS:  Denies fever, chills, SOB, CP, abd pain, N/V  [x] All other systems negative    OBJECTIVE:  ICU Vital Signs Last 24 Hrs  T(C): 36.8 (16 Aug 2020 05:47), Max: 36.8 (16 Aug 2020 05:47)  T(F): 98.2 (16 Aug 2020 05:47), Max: 98.2 (16 Aug 2020 05:47)  HR: 81 (16 Aug 2020 05:47) (78 - 88)  BP: 128/77 (16 Aug 2020 05:47) (128/77 - 138/81)  BP(mean): --  ABP: --  ABP(mean): --  RR: 17 (16 Aug 2020 05:47) (17 - 18)  SpO2: 97% (16 Aug 2020 05:47) (97% - 99%)        CAPILLARY BLOOD GLUCOSE      POCT Blood Glucose.: 90 mg/dL (15 Aug 2020 21:08)      HOSPITAL MEDICATIONS:  MEDICATIONS  (STANDING):  budesonide 160 MICROgram(s)/formoterol 4.5 MICROgram(s) Inhaler 2 Puff(s) Inhalation two times a day  chlorhexidine 4% Liquid 1 Application(s) Topical daily  dextrose 5%. 1000 milliLiter(s) (50 mL/Hr) IV Continuous <Continuous>  dextrose 50% Injectable 12.5 Gram(s) IV Push once  dextrose 50% Injectable 25 Gram(s) IV Push once  dextrose 50% Injectable 25 Gram(s) IV Push once  enoxaparin Injectable 40 milliGRAM(s) SubCutaneous daily  insulin glargine Injectable (LANTUS) 14 Unit(s) SubCutaneous at bedtime  insulin lispro (HumaLOG) corrective regimen sliding scale   SubCutaneous three times a day before meals  insulin lispro (HumaLOG) corrective regimen sliding scale   SubCutaneous at bedtime  insulin lispro Injectable (HumaLOG) 8 Unit(s) SubCutaneous three times a day before meals  levalbuterol Inhalation 0.63 milliGRAM(s) Inhalation every 4 hours  levoFLOXacin  Tablet 750 milliGRAM(s) Oral every 24 hours  multivitamin 1 Tablet(s) Oral daily  predniSONE   Tablet 60 milliGRAM(s) Oral daily  sodium chloride 3%  Inhalation 3 milliLiter(s) Inhalation every 6 hours  tobramycin for Nebulization 300 milliGRAM(s) Inhalation every 12 hours  venlafaxine XR. 37.5 milliGRAM(s) Oral daily    MEDICATIONS  (PRN):  benzonatate 100 milliGRAM(s) Oral every 8 hours PRN Cough  dextrose 40% Gel 15 Gram(s) Oral once PRN Blood Glucose LESS THAN 70 milliGRAM(s)/deciliter  fluticasone propionate 50 MICROgram(s)/spray Nasal Spray 1 Spray(s) Both Nostrils two times a day PRN nasal congestion  glucagon  Injectable 1 milliGRAM(s) IntraMuscular once PRN Glucose LESS THAN 70 milligrams/deciliter  guaiFENesin   Syrup  (Sugar-Free) 200 milliGRAM(s) Oral every 6 hours PRN Cough  hydrocodone/homatropine Syrup 5 milliLiter(s) Oral at bedtime PRN Severe Cough  simethicone 80 milliGRAM(s) Chew every 12 hours PRN Gas      LABS:                        13.5   8.16  )-----------( 151      ( 16 Aug 2020 06:00 )             45.4     08-16    135  |  97<L>  |  24<H>  ----------------------------<  167<H>  4.4   |  26  |  0.51    Ca    9.2      16 Aug 2020 06:00  Phos  3.3     08-16  Mg     2.1     08-16    TPro  6.2  /  Alb  3.2<L>  /  TBili  0.3  /  DBili  x   /  AST  68<H>  /  ALT  79<H>  /  AlkPhos  138<H>  08-16                        MICROBIOLOGY:     RADIOLOGY:  [ ] Reviewed and interpreted by me    PULMONARY FUNCTION TESTS:    EKG:

## 2020-08-16 NOTE — PROGRESS NOTE ADULT - ASSESSMENT
66 YO Leandro speaking Female with PMHx of Diffuse Cystitic Bronchiectasis (Pseudomonas and Stenotrophmonas on previous cx) on intermittent on Prednisone, Primary Ciliary Dyskinesia, Allergic Bronchopulmonary Aspergillosis, HTN, DM2 A1C 8.7 (8/2020) p/w worsening productive cough, SOB, CP and intermittent fever over last 2 weeks. CXR unchanged with BL hazy opacities. Duonebs and Solumedrol given and admitted to Medicine. During admission, chest PT needed with NC trended up to 5L and transferred to [facility] on 8/11.     # Bronchiectasis Exacerbation 2/2 Pseudomonas PNA   - CXR with unchanged hazy opacities  - COVID negative and AB negative  - BCx NGTD. SCx (+) for Pseudomonas, pending sensitivities   - Continue Cough Syrup Q6H PRN. Hycodan at bedtime PRN.   - DC Cipro. Started Zosyn and Jerry 8/11   - Prednisone 40mg PO QD changed to Solumedrol 40mg IV QD for now.   - Duonebs (changed to Xopenex) Q4H. Symbicort BID.   - Monitor O2 and wean off as tolerated.   - IS and Acapella encouraged. Chest PT Q6H and Suction PRN.   - 3% saline nebs   - Chest vest   - DC zosyn - PA sensative to levaquin   - dc solumedrol and start prednisone 60 daily     # Allergic Bronchopulmonary Aspergillosis   - thick secretions   - Fungal sputum cx   - Aspergillus precipitan   - Aspergillus IGg   - Serum immunoglobulin    # HTN  - Holding home Norvasc 5mg PO QD.   - Monitor BP and continue diet modifications for now.     # IDDM2 A1C 8.7 (8/2020)  - Continue on Lantus 14 U QHS and Lispro 8 U AC with moderate ISS ACHS.   - Monitor FS and adjust as needed while on steroids.     # Anxiety  - Per pt/daughter has hx of anxiety and given zoloft by her primary care physician   - Not helpful and takes it prn not daily   - Will start Effexor       # DVT PPX with Lovenox  # DISPO - [facility]. PT consulted. 66 YO Leandro speaking Female with PMHx of Diffuse Cystitic Bronchiectasis (Pseudomonas and Stenotrophmonas on previous cx) on intermittent on Prednisone, Primary Ciliary Dyskinesia, Allergic Bronchopulmonary Aspergillosis, HTN, DM2 A1C 8.7 (8/2020) p/w worsening productive cough, SOB, CP and intermittent fever over last 2 weeks. CXR unchanged with BL hazy opacities. Duonebs and Solumedrol given and admitted to Medicine. During admission, chest PT needed with NC trended up to 5L and transferred to [facility] on 8/11.     # Bronchiectasis Exacerbation 2/2 Pseudomonas PNA   - CXR with unchanged hazy opacities  - COVID negative and AB negative  - BCx NGTD. SCx (+) for Pseudomonas, pending sensitivities   - Continue Cough Syrup Q6H PRN. Hycodan at bedtime PRN.   - DC Cipro. Started Zosyn and Jerry 8/11   - Prednisone 40mg PO QD changed to Solumedrol 40mg IV QD for now.   - Duonebs (changed to Xopenex) Q4H. Symbicort BID.   - Monitor O2 and wean off as tolerated.   - IS and Acapella encouraged. Chest PT Q6H and Suction PRN.   - 3% saline nebs   - Chest vest   - DC zosyn - PA sensative to levaquin   - dc solumedrol and start prednisone 60 daily     # Allergic Bronchopulmonary Aspergillosis   - thick secretions   - Fungal sputum cx   - Aspergillus precipitan   - Aspergillus IGg   - Serum immunoglobulin    # HTN  - Holding home Norvasc 5mg PO QD.   - Monitor BP and continue diet modifications for now.     # IDDM2 A1C 8.7 (8/2020)  - Continue on Lantus 14 U QHS and Lispro 8 U AC with moderate ISS ACHS.   - Monitor FS and adjust as needed while on steroids.     # Anxiety  - Per pt/daughter has hx of anxiety and given zoloft by her primary care physician   - Zoloft not helpful and takes it prn not daily   - Tolerating Effexor well    # DVT PPX with Lovenox  # DISPO - [facility]. PT: Home w/ Home PT.

## 2020-08-16 NOTE — PROGRESS NOTE ADULT - ATTENDING COMMENTS
pt with PCD, chronic pseudomonal respiratory infection, hx of ABPA. diffuse wheezing today and thick sputum.   ordered ABPA workup.  on airway clearance therapy with Alb neb, HS 3%, vest BID  on  prednisone 60 mg daily (pt was recently on 40 mg at home)  continue Iinhaled tobramycin; will d/w outpatient pulmonary practice to order neb tobramycin for QOM maintenance therapy.   continue 14 days of anti-pseudomonal coverage, with  PO levaquin (PA is sensitive on most recent sputum cx).  has poorly controlled generalized anxiety - on effexor.

## 2020-08-17 DIAGNOSIS — I10 ESSENTIAL (PRIMARY) HYPERTENSION: ICD-10-CM

## 2020-08-17 DIAGNOSIS — J18.9 PNEUMONIA, UNSPECIFIED ORGANISM: ICD-10-CM

## 2020-08-17 DIAGNOSIS — E11.9 TYPE 2 DIABETES MELLITUS WITHOUT COMPLICATIONS: ICD-10-CM

## 2020-08-17 DIAGNOSIS — F41.9 ANXIETY DISORDER, UNSPECIFIED: ICD-10-CM

## 2020-08-17 LAB
ALBUMIN SERPL ELPH-MCNC: 3.4 G/DL — SIGNIFICANT CHANGE UP (ref 3.3–5)
ALP SERPL-CCNC: 157 U/L — HIGH (ref 40–120)
ALT FLD-CCNC: 157 U/L — HIGH (ref 4–33)
ANION GAP SERPL CALC-SCNC: 11 MMO/L — SIGNIFICANT CHANGE UP (ref 7–14)
AST SERPL-CCNC: 130 U/L — HIGH (ref 4–32)
BILIRUB SERPL-MCNC: 0.4 MG/DL — SIGNIFICANT CHANGE UP (ref 0.2–1.2)
BUN SERPL-MCNC: 19 MG/DL — SIGNIFICANT CHANGE UP (ref 7–23)
CALCIUM SERPL-MCNC: 9.4 MG/DL — SIGNIFICANT CHANGE UP (ref 8.4–10.5)
CHLORIDE SERPL-SCNC: 96 MMOL/L — LOW (ref 98–107)
CO2 SERPL-SCNC: 30 MMOL/L — SIGNIFICANT CHANGE UP (ref 22–31)
CREAT SERPL-MCNC: 0.47 MG/DL — LOW (ref 0.5–1.3)
GLUCOSE BLDC GLUCOMTR-MCNC: 132 MG/DL — HIGH (ref 70–99)
GLUCOSE BLDC GLUCOMTR-MCNC: 198 MG/DL — HIGH (ref 70–99)
GLUCOSE BLDC GLUCOMTR-MCNC: 319 MG/DL — HIGH (ref 70–99)
GLUCOSE BLDC GLUCOMTR-MCNC: 393 MG/DL — HIGH (ref 70–99)
GLUCOSE BLDC GLUCOMTR-MCNC: 98 MG/DL — SIGNIFICANT CHANGE UP (ref 70–99)
GLUCOSE SERPL-MCNC: 140 MG/DL — HIGH (ref 70–99)
HCT VFR BLD CALC: 42.4 % — SIGNIFICANT CHANGE UP (ref 34.5–45)
HGB BLD-MCNC: 13.6 G/DL — SIGNIFICANT CHANGE UP (ref 11.5–15.5)
IGE SERPL-ACNC: 30 — SIGNIFICANT CHANGE UP
MCHC RBC-ENTMCNC: 28.2 PG — SIGNIFICANT CHANGE UP (ref 27–34)
MCHC RBC-ENTMCNC: 32.1 % — SIGNIFICANT CHANGE UP (ref 32–36)
MCV RBC AUTO: 88 FL — SIGNIFICANT CHANGE UP (ref 80–100)
NRBC # FLD: 0 K/UL — SIGNIFICANT CHANGE UP (ref 0–0)
PLATELET # BLD AUTO: 156 K/UL — SIGNIFICANT CHANGE UP (ref 150–400)
PMV BLD: 11.6 FL — SIGNIFICANT CHANGE UP (ref 7–13)
POTASSIUM SERPL-MCNC: 4.2 MMOL/L — SIGNIFICANT CHANGE UP (ref 3.5–5.3)
POTASSIUM SERPL-SCNC: 4.2 MMOL/L — SIGNIFICANT CHANGE UP (ref 3.5–5.3)
PROT SERPL-MCNC: 6.6 G/DL — SIGNIFICANT CHANGE UP (ref 6–8.3)
RBC # BLD: 4.82 M/UL — SIGNIFICANT CHANGE UP (ref 3.8–5.2)
RBC # FLD: 13.6 % — SIGNIFICANT CHANGE UP (ref 10.3–14.5)
SODIUM SERPL-SCNC: 137 MMOL/L — SIGNIFICANT CHANGE UP (ref 135–145)
WBC # BLD: 8.36 K/UL — SIGNIFICANT CHANGE UP (ref 3.8–10.5)
WBC # FLD AUTO: 8.36 K/UL — SIGNIFICANT CHANGE UP (ref 3.8–10.5)

## 2020-08-17 PROCEDURE — 99233 SBSQ HOSP IP/OBS HIGH 50: CPT | Mod: GC

## 2020-08-17 RX ORDER — POLYETHYLENE GLYCOL 3350 17 G/17G
17 POWDER, FOR SOLUTION ORAL
Refills: 0 | Status: DISCONTINUED | OUTPATIENT
Start: 2020-08-17 | End: 2020-08-19

## 2020-08-17 RX ORDER — INSULIN LISPRO 100/ML
4 VIAL (ML) SUBCUTANEOUS ONCE
Refills: 0 | Status: DISCONTINUED | OUTPATIENT
Start: 2020-08-17 | End: 2020-08-17

## 2020-08-17 RX ORDER — INSULIN GLARGINE 100 [IU]/ML
16 INJECTION, SOLUTION SUBCUTANEOUS AT BEDTIME
Refills: 0 | Status: DISCONTINUED | OUTPATIENT
Start: 2020-08-17 | End: 2020-08-19

## 2020-08-17 RX ORDER — AMLODIPINE BESYLATE 2.5 MG/1
5 TABLET ORAL DAILY
Refills: 0 | Status: DISCONTINUED | OUTPATIENT
Start: 2020-08-17 | End: 2020-08-19

## 2020-08-17 RX ADMIN — LEVALBUTEROL 0.63 MILLIGRAM(S): 1.25 SOLUTION, CONCENTRATE RESPIRATORY (INHALATION) at 16:16

## 2020-08-17 RX ADMIN — LEVALBUTEROL 0.63 MILLIGRAM(S): 1.25 SOLUTION, CONCENTRATE RESPIRATORY (INHALATION) at 12:26

## 2020-08-17 RX ADMIN — BENZOCAINE AND MENTHOL 1 LOZENGE: 5; 1 LIQUID ORAL at 06:09

## 2020-08-17 RX ADMIN — Medication 8 UNIT(S): at 07:40

## 2020-08-17 RX ADMIN — Medication 1 TABLET(S): at 11:34

## 2020-08-17 RX ADMIN — Medication 2: at 07:40

## 2020-08-17 RX ADMIN — Medication 100 MILLIGRAM(S): at 06:18

## 2020-08-17 RX ADMIN — INSULIN GLARGINE 16 UNIT(S): 100 INJECTION, SOLUTION SUBCUTANEOUS at 21:45

## 2020-08-17 RX ADMIN — Medication 300 MILLIGRAM(S): at 20:06

## 2020-08-17 RX ADMIN — BUDESONIDE AND FORMOTEROL FUMARATE DIHYDRATE 2 PUFF(S): 160; 4.5 AEROSOL RESPIRATORY (INHALATION) at 20:12

## 2020-08-17 RX ADMIN — Medication 37.5 MILLIGRAM(S): at 11:34

## 2020-08-17 RX ADMIN — Medication 8 UNIT(S): at 11:33

## 2020-08-17 RX ADMIN — AMLODIPINE BESYLATE 5 MILLIGRAM(S): 2.5 TABLET ORAL at 11:37

## 2020-08-17 RX ADMIN — Medication 8 UNIT(S): at 17:29

## 2020-08-17 RX ADMIN — ENOXAPARIN SODIUM 40 MILLIGRAM(S): 100 INJECTION SUBCUTANEOUS at 11:34

## 2020-08-17 RX ADMIN — Medication 60 MILLIGRAM(S): at 06:10

## 2020-08-17 RX ADMIN — LEVALBUTEROL 0.63 MILLIGRAM(S): 1.25 SOLUTION, CONCENTRATE RESPIRATORY (INHALATION) at 20:03

## 2020-08-17 RX ADMIN — Medication 300 MILLIGRAM(S): at 08:23

## 2020-08-17 RX ADMIN — SODIUM CHLORIDE 3 MILLILITER(S): 9 INJECTION INTRAMUSCULAR; INTRAVENOUS; SUBCUTANEOUS at 22:10

## 2020-08-17 RX ADMIN — SODIUM CHLORIDE 3 MILLILITER(S): 9 INJECTION INTRAMUSCULAR; INTRAVENOUS; SUBCUTANEOUS at 16:20

## 2020-08-17 RX ADMIN — BUDESONIDE AND FORMOTEROL FUMARATE DIHYDRATE 2 PUFF(S): 160; 4.5 AEROSOL RESPIRATORY (INHALATION) at 08:23

## 2020-08-17 RX ADMIN — Medication 10: at 11:33

## 2020-08-17 RX ADMIN — Medication 8: at 17:28

## 2020-08-17 RX ADMIN — SODIUM CHLORIDE 3 MILLILITER(S): 9 INJECTION INTRAMUSCULAR; INTRAVENOUS; SUBCUTANEOUS at 08:24

## 2020-08-17 RX ADMIN — CHLORHEXIDINE GLUCONATE 1 APPLICATION(S): 213 SOLUTION TOPICAL at 11:34

## 2020-08-17 RX ADMIN — LEVALBUTEROL 0.63 MILLIGRAM(S): 1.25 SOLUTION, CONCENTRATE RESPIRATORY (INHALATION) at 08:19

## 2020-08-17 RX ADMIN — POLYETHYLENE GLYCOL 3350 17 GRAM(S): 17 POWDER, FOR SOLUTION ORAL at 17:29

## 2020-08-17 NOTE — PROGRESS NOTE ADULT - ATTENDING COMMENTS
Bronchiectasis exacerbation,  Continues to have wheezing.  Continue current dose of steroids.  Continue airway clearance.  Check ambulatory room air saturation to assess need for oxygen therapy.

## 2020-08-17 NOTE — PROGRESS NOTE ADULT - SUBJECTIVE AND OBJECTIVE BOX
CHIEF COMPLAINT: SOB and worsening cough    Interval Events:    REVIEW OF SYSTEMS:  Constitutional:   Eyes:  ENT:  CV:  Resp:  GI:  :  MSK:  Integumentary:  Neurological:  Psychiatric:  Endocrine:  Hematologic/Lymphatic:  Allergic/Immunologic:  [ ] All other systems negative  [ ] Unable to assess ROS because ________    OBJECTIVE:  ICU Vital Signs Last 24 Hrs  T(C): 36.4 (17 Aug 2020 06:08), Max: 36.9 (16 Aug 2020 22:07)  T(F): 97.5 (17 Aug 2020 06:08), Max: 98.4 (16 Aug 2020 22:07)  HR: 76 (17 Aug 2020 06:08) (76 - 85)  BP: 144/74 (17 Aug 2020 06:08) (137/66 - 149/79)  RR: 18 (17 Aug 2020 06:08) (17 - 18)  SpO2: 98% (17 Aug 2020 06:08) (95% - 98%)    08-16 @ 07:01  -  08-17 @ 07:00  --------------------------------------------------------  IN: 200 mL / OUT: 0 mL / NET: 200 mL    POCT Blood Glucose.: 165 mg/dL (16 Aug 2020 22:02)    HOSPITAL MEDICATIONS:  MEDICATIONS  (STANDING):  budesonide 160 MICROgram(s)/formoterol 4.5 MICROgram(s) Inhaler 2 Puff(s) Inhalation two times a day  chlorhexidine 4% Liquid 1 Application(s) Topical daily  dextrose 5%. 1000 milliLiter(s) (50 mL/Hr) IV Continuous <Continuous>  dextrose 50% Injectable 12.5 Gram(s) IV Push once  dextrose 50% Injectable 25 Gram(s) IV Push once  dextrose 50% Injectable 25 Gram(s) IV Push once  enoxaparin Injectable 40 milliGRAM(s) SubCutaneous daily  insulin glargine Injectable (LANTUS) 14 Unit(s) SubCutaneous at bedtime  insulin lispro (HumaLOG) corrective regimen sliding scale   SubCutaneous three times a day before meals  insulin lispro (HumaLOG) corrective regimen sliding scale   SubCutaneous at bedtime  insulin lispro Injectable (HumaLOG) 8 Unit(s) SubCutaneous three times a day before meals  levalbuterol Inhalation 0.63 milliGRAM(s) Inhalation every 4 hours  levoFLOXacin  Tablet 750 milliGRAM(s) Oral every 24 hours  multivitamin 1 Tablet(s) Oral daily  predniSONE   Tablet 60 milliGRAM(s) Oral daily  sodium chloride 3%  Inhalation 3 milliLiter(s) Inhalation every 6 hours  tobramycin for Nebulization 300 milliGRAM(s) Inhalation every 12 hours  venlafaxine XR. 37.5 milliGRAM(s) Oral daily    MEDICATIONS  (PRN):  benzocaine 15 mG/menthol 3.6 mG (Sugar-Free) Lozenge 1 Lozenge Oral every 4 hours PRN Sore Throat  benzonatate 100 milliGRAM(s) Oral every 8 hours PRN Cough  dextrose 40% Gel 15 Gram(s) Oral once PRN Blood Glucose LESS THAN 70 milliGRAM(s)/deciliter  fluticasone propionate 50 MICROgram(s)/spray Nasal Spray 1 Spray(s) Both Nostrils two times a day PRN nasal congestion  glucagon  Injectable 1 milliGRAM(s) IntraMuscular once PRN Glucose LESS THAN 70 milligrams/deciliter  guaiFENesin   Syrup  (Sugar-Free) 200 milliGRAM(s) Oral every 6 hours PRN Cough  hydrocodone/homatropine Syrup 5 milliLiter(s) Oral at bedtime PRN Severe Cough  simethicone 80 milliGRAM(s) Chew every 12 hours PRN Gas    LABS:                        13.5   8.16  )-----------( 151      ( 16 Aug 2020 06:00 )             45.4     08-16    135  |  97<L>  |  24<H>  ----------------------------<  167<H>  4.4   |  26  |  0.51    Ca    9.2      16 Aug 2020 06:00  Phos  3.3     08-16  Mg     2.1     08-16    TPro  6.2  /  Alb  3.2<L>  /  TBili  0.3  /  DBili  x   /  AST  68<H>  /  ALT  79<H>  /  AlkPhos  138<H>  08-16    MICROBIOLOGY: Pseudomonas     RADIOLOGY:  [X] Reviewed and interpreted by me CHIEF COMPLAINT: SOB and worsening cough    Interval Events: None overnight     REVIEW OF SYSTEMS:  Resp: Cough and thick sputum production   GI: Actively having BMs  [X] All other systems negative    OBJECTIVE:  ICU Vital Signs Last 24 Hrs  T(C): 36.4 (17 Aug 2020 06:08), Max: 36.9 (16 Aug 2020 22:07)  T(F): 97.5 (17 Aug 2020 06:08), Max: 98.4 (16 Aug 2020 22:07)  HR: 76 (17 Aug 2020 06:08) (76 - 85)  BP: 144/74 (17 Aug 2020 06:08) (137/66 - 149/79)  RR: 18 (17 Aug 2020 06:08) (17 - 18)  SpO2: 98% (17 Aug 2020 06:08) (95% - 98%)    08-16 @ 07:01  -  08-17 @ 07:00  --------------------------------------------------------  IN: 200 mL / OUT: 0 mL / NET: 200 mL    POCT Blood Glucose.: 165 mg/dL (16 Aug 2020 22:02)    HOSPITAL MEDICATIONS:  MEDICATIONS  (STANDING):  budesonide 160 MICROgram(s)/formoterol 4.5 MICROgram(s) Inhaler 2 Puff(s) Inhalation two times a day  chlorhexidine 4% Liquid 1 Application(s) Topical daily  dextrose 5%. 1000 milliLiter(s) (50 mL/Hr) IV Continuous <Continuous>  dextrose 50% Injectable 12.5 Gram(s) IV Push once  dextrose 50% Injectable 25 Gram(s) IV Push once  dextrose 50% Injectable 25 Gram(s) IV Push once  enoxaparin Injectable 40 milliGRAM(s) SubCutaneous daily  insulin glargine Injectable (LANTUS) 14 Unit(s) SubCutaneous at bedtime  insulin lispro (HumaLOG) corrective regimen sliding scale   SubCutaneous three times a day before meals  insulin lispro (HumaLOG) corrective regimen sliding scale   SubCutaneous at bedtime  insulin lispro Injectable (HumaLOG) 8 Unit(s) SubCutaneous three times a day before meals  levalbuterol Inhalation 0.63 milliGRAM(s) Inhalation every 4 hours  levoFLOXacin  Tablet 750 milliGRAM(s) Oral every 24 hours  multivitamin 1 Tablet(s) Oral daily  predniSONE   Tablet 60 milliGRAM(s) Oral daily  sodium chloride 3%  Inhalation 3 milliLiter(s) Inhalation every 6 hours  tobramycin for Nebulization 300 milliGRAM(s) Inhalation every 12 hours  venlafaxine XR. 37.5 milliGRAM(s) Oral daily    MEDICATIONS  (PRN):  benzocaine 15 mG/menthol 3.6 mG (Sugar-Free) Lozenge 1 Lozenge Oral every 4 hours PRN Sore Throat  benzonatate 100 milliGRAM(s) Oral every 8 hours PRN Cough  dextrose 40% Gel 15 Gram(s) Oral once PRN Blood Glucose LESS THAN 70 milliGRAM(s)/deciliter  fluticasone propionate 50 MICROgram(s)/spray Nasal Spray 1 Spray(s) Both Nostrils two times a day PRN nasal congestion  glucagon  Injectable 1 milliGRAM(s) IntraMuscular once PRN Glucose LESS THAN 70 milligrams/deciliter  guaiFENesin   Syrup  (Sugar-Free) 200 milliGRAM(s) Oral every 6 hours PRN Cough  hydrocodone/homatropine Syrup 5 milliLiter(s) Oral at bedtime PRN Severe Cough  simethicone 80 milliGRAM(s) Chew every 12 hours PRN Gas    LABS:                        13.5   8.16  )-----------( 151      ( 16 Aug 2020 06:00 )             45.4     08-16    135  |  97<L>  |  24<H>  ----------------------------<  167<H>  4.4   |  26  |  0.51    Ca    9.2      16 Aug 2020 06:00  Phos  3.3     08-16  Mg     2.1     08-16    TPro  6.2  /  Alb  3.2<L>  /  TBili  0.3  /  DBili  x   /  AST  68<H>  /  ALT  79<H>  /  AlkPhos  138<H>  08-16    MICROBIOLOGY: Pseudomonas     RADIOLOGY:  [X] Reviewed and interpreted by me

## 2020-08-17 NOTE — PROGRESS NOTE ADULT - PROBLEM SELECTOR PLAN 1
- In the setting of acute bronchiectasis exacerbation and Pseudomonas pneumonia started on IV steroids and IV antibiotics transitioned to PO  - Tolerating 2-3L O2 via NC w/ adequate pulse Ox  - Ordered ABPA workup (Pending)

## 2020-08-17 NOTE — PROGRESS NOTE ADULT - PROBLEM SELECTOR PLAN 2
- CXR with unchanged hazy opacities  - Initially on Solumedrol IV and transitioned to Prednisone 60 daily  - IS and Acapella encouraged. Chest PT Q6H and Suction PRN w/ 3% saline nebs

## 2020-08-17 NOTE — CHART NOTE - NSCHARTNOTEFT_GEN_A_CORE
Nutrition Follow-Up Note   Reason for follow-up: Critical care length of stay follow- up. Medical record reviewed. Patient Leandro speaking - declined  service and preferred to call Daughter to provide translation services.    Diet Order: Diet, Consistent Carbohydrate Renal w/Evening Snack:   Low Sodium  Supplement Feeding Modality:  Oral  Glucerna Shake Cans or Servings Per Day:  1       Frequency:  Three Times a day (08-12-20 @ 12:10)    Nutrition Related Information: Patient reports eating okay and tolerating PO. Daughter notes patient gets hungry around 8pm each night - increased appetite possibly 2/2 steroid therapy. Patient has some snacks at bedside - pretzels, apple sauce - to consume when hungry in between meals. Patient receiving Glucerna Shakes to supplement PO intake. Finger sticks elevated - likely 2/2 steroids.    GI: Patient endorses constipation - last recorded BM 8/11 per EMR. No nausea/vomiting.     Anthropometric Measurements:   Height (cm): 162.6   Weight (kg): no new weight to assess, 43 (8/11)  BMI (kg/m2): 16.3     Medications: MEDICATIONS  (STANDING):  amLODIPine   Tablet 5 milliGRAM(s) Oral daily  budesonide 160 MICROgram(s)/formoterol 4.5 MICROgram(s) Inhaler 2 Puff(s) Inhalation two times a day  chlorhexidine 4% Liquid 1 Application(s) Topical daily  enoxaparin Injectable 40 milliGRAM(s) SubCutaneous daily  insulin glargine Injectable (LANTUS) 14 Unit(s) SubCutaneous at bedtime  insulin lispro (HumaLOG) corrective regimen sliding scale   SubCutaneous three times a day before meals  insulin lispro (HumaLOG) corrective regimen sliding scale   SubCutaneous at bedtime  levalbuterol Inhalation 0.63 milliGRAM(s) Inhalation every 4 hours  levoFLOXacin  Tablet 750 milliGRAM(s) Oral every 24 hours  multivitamin 1 Tablet(s) Oral daily  predniSONE   Tablet 60 milliGRAM(s) Oral daily  sodium chloride 3%  Inhalation 3 milliLiter(s) Inhalation every 6 hours  tobramycin for Nebulization 300 milliGRAM(s) Inhalation every 12 hours  venlafaxine XR. 37.5 milliGRAM(s) Oral daily    MEDICATIONS  (PRN):  benzocaine 15 mG/menthol 3.6 mG (Sugar-Free) Lozenge 1 Lozenge Oral every 4 hours PRN Sore Throat  benzonatate 100 milliGRAM(s) Oral every 8 hours PRN Cough  dextrose 40% Gel 15 Gram(s) Oral once PRN Blood Glucose LESS THAN 70 milliGRAM(s)/deciliter  fluticasone propionate 50 MICROgram(s)/spray Nasal Spray 1 Spray(s) Both Nostrils two times a day PRN nasal congestion  glucagon  Injectable 1 milliGRAM(s) IntraMuscular once PRN Glucose LESS THAN 70 milligrams/deciliter  guaiFENesin   Syrup  (Sugar-Free) 200 milliGRAM(s) Oral every 6 hours PRN Cough  hydrocodone/homatropine Syrup 5 milliLiter(s) Oral at bedtime PRN Severe Cough  simethicone 80 milliGRAM(s) Chew every 12 hours PRN Gas    Labs: 08-17 @ 06:38: Sodium 137, Potassium 4.2, Calcium 9.4, Magnesium --, Phosphorus --, BUN 19, Creatinine 0.47<L>, Glucose 140<H>, Alk Phos 157<H>, ALT/SGPT 157<H>, AST/SGOT 130<H>, Albumin 3.4, Prealbumin --, Total Bilirubin 0.4, Hemoglobin 13.6, Hematocrit 42.4, Ferritin --, C-Reactive Protein --, Creatine Kinase <<27>    POCT Blood Glucose.: 393 mg/dL (08-17-20 @ 11:29)  POCT Blood Glucose.: 198 mg/dL (08-17-20 @ 07:33)  POCT Blood Glucose.: 165 mg/dL (08-16-20 @ 22:02)  POCT Blood Glucose.: 225 mg/dL (08-16-20 @ 16:39)    Skin: No pressure ulcers/DTI noted in flowsheets.  Edema: none noted    Estimated Nutrition Needs   [x] no change since previous assessment   [  ] Recalculated:    Previous Nutrition Diagnosis: Malnutrition, severe  [x] ongoing    Nutrition Interventions/Recommendations:   1. Recommend Consistent Carbohydrate w/ evening snack, Low Sodium diet.  2. Continue Glucerna Therapeutic Nutrition Shake 240mls 3x daily (660kcals, 30g protein).  3. Continue multivitamin daily for micronutrient coverage.  4. Please Encourage po intake, assist with meals and menu selections, provide alternatives PRN.  5. Nutrition and  Department will honor food and beverage preferences of patient in an effort to maximize level of nutrient intake.      Monitoring and Evaluation:   Monitor nutrition provision, tolerance to diet, weights, labs, skin integrity and GI function.  RD remains available, please consult as needed. Will follow up per protocol.  Racheal Villalobos RD, CDN Pager #[phone] Nutrition Follow-Up Note   Reason for follow-up: Critical care length of stay follow- up. Medical record reviewed. Patient Leandro speaking - declined  service and preferred to call Daughter to provide translation services.    Diet Order: Diet, Consistent Carbohydrate Renal w/Evening Snack:   Low Sodium  Supplement Feeding Modality:  Oral  Glucerna Shake Cans or Servings Per Day:  1       Frequency:  Three Times a day (08-12-20 @ 12:10)    Nutrition Related Information: Patient reports eating okay and tolerating PO. Daughter notes patient gets hungry around 8pm each night - increased appetite possibly 2/2 steroid therapy. Patient has some snacks at bedside - pretzels, apple sauce - to consume when hungry in between meals. Patient receiving Glucerna Shakes to supplement PO intake. Finger sticks elevated - likely 2/2 steroids.    GI: Patient endorses constipation - last recorded BM 8/11 per EMR. No nausea/vomiting.     Anthropometric Measurements:   Height (cm): 162.6   Weight (kg): no new weight to assess, 43 (8/11)  BMI (kg/m2): 16.3     Medications: MEDICATIONS  (STANDING):  amLODIPine   Tablet 5 milliGRAM(s) Oral daily  budesonide 160 MICROgram(s)/formoterol 4.5 MICROgram(s) Inhaler 2 Puff(s) Inhalation two times a day  chlorhexidine 4% Liquid 1 Application(s) Topical daily  enoxaparin Injectable 40 milliGRAM(s) SubCutaneous daily  insulin glargine Injectable (LANTUS) 14 Unit(s) SubCutaneous at bedtime  insulin lispro (HumaLOG) corrective regimen sliding scale   SubCutaneous three times a day before meals  insulin lispro (HumaLOG) corrective regimen sliding scale   SubCutaneous at bedtime  levalbuterol Inhalation 0.63 milliGRAM(s) Inhalation every 4 hours  levoFLOXacin  Tablet 750 milliGRAM(s) Oral every 24 hours  multivitamin 1 Tablet(s) Oral daily  predniSONE   Tablet 60 milliGRAM(s) Oral daily  sodium chloride 3%  Inhalation 3 milliLiter(s) Inhalation every 6 hours  tobramycin for Nebulization 300 milliGRAM(s) Inhalation every 12 hours  venlafaxine XR. 37.5 milliGRAM(s) Oral daily    MEDICATIONS  (PRN):  benzocaine 15 mG/menthol 3.6 mG (Sugar-Free) Lozenge 1 Lozenge Oral every 4 hours PRN Sore Throat  benzonatate 100 milliGRAM(s) Oral every 8 hours PRN Cough  dextrose 40% Gel 15 Gram(s) Oral once PRN Blood Glucose LESS THAN 70 milliGRAM(s)/deciliter  fluticasone propionate 50 MICROgram(s)/spray Nasal Spray 1 Spray(s) Both Nostrils two times a day PRN nasal congestion  glucagon  Injectable 1 milliGRAM(s) IntraMuscular once PRN Glucose LESS THAN 70 milligrams/deciliter  guaiFENesin   Syrup  (Sugar-Free) 200 milliGRAM(s) Oral every 6 hours PRN Cough  hydrocodone/homatropine Syrup 5 milliLiter(s) Oral at bedtime PRN Severe Cough  simethicone 80 milliGRAM(s) Chew every 12 hours PRN Gas    Labs: 08-17 @ 06:38: Sodium 137, Potassium 4.2, Calcium 9.4, Magnesium --, Phosphorus --, BUN 19, Creatinine 0.47<L>, Glucose 140<H>, Alk Phos 157<H>, ALT/SGPT 157<H>, AST/SGOT 130<H>, Albumin 3.4, Prealbumin --, Total Bilirubin 0.4, Hemoglobin 13.6, Hematocrit 42.4, Ferritin --, C-Reactive Protein --, Creatine Kinase <<27>    POCT Blood Glucose.: 393 mg/dL (08-17-20 @ 11:29)  POCT Blood Glucose.: 198 mg/dL (08-17-20 @ 07:33)  POCT Blood Glucose.: 165 mg/dL (08-16-20 @ 22:02)  POCT Blood Glucose.: 225 mg/dL (08-16-20 @ 16:39)    Skin: No pressure ulcers/DTI noted in flowsheets.  Edema: none noted    Estimated Nutrition Needs   [x] no change since previous assessment   [  ] Recalculated:    Previous Nutrition Diagnosis: Malnutrition, severe  [x] ongoing    Nutrition Interventions/Recommendations:   1. Recommend Consistent Carbohydrate w/ evening snack, Low Sodium diet.  2. Continue Glucerna Therapeutic Nutrition Shake 240mls 3x daily (660kcals, 30g protein).  3. Continue multivitamin daily for micronutrient coverage.  4. Please Encourage po intake, assist with meals and menu selections, provide alternatives PRN.  5. Nutrition and  Department will honor food and beverage preferences of patient in an effort to maximize level of nutrient intake.   6. Consider laxative/stool softener to help alleviate constipation.     Monitoring and Evaluation:   Monitor nutrition provision, tolerance to diet, weights, labs, skin integrity and GI function.  RD remains available, please consult as needed. Will follow up per protocol.  Racheal Villalobos RD, CDN Pager #[phone]

## 2020-08-17 NOTE — PROGRESS NOTE ADULT - PROBLEM SELECTOR PLAN 5
- A1C noted ot be 8.7% 8/2020  - Continuing on home Lantus 14U qHS and Humalog TID before meals 8U  - FS controlled w/ additional ISS

## 2020-08-17 NOTE — PROGRESS NOTE ADULT - PROBLEM SELECTOR PLAN 3
- COVID negative and AB negative  - BCx NGTD. SCx (+) for Pseudomonas  - Continue Cough Syrup Q6H PRN. Hycodan at bedtime PRN.   - Started Zosyn and Jerry 8/11 transitioned to Levaquin PO based on sensitivities to complete 14 day course  - Continue with pulmonary treatments and supplemental O2 tolerating 2-3L   - Wean O2 as tolerated  - Duo-Nebs changed to Xopenex

## 2020-08-17 NOTE — PROGRESS NOTE ADULT - CVS HE PE MLT D E PC
regular rate and rhythm
regular rate and rhythm/no murmur/no rub
regular rate and rhythm
no murmur/regular rate and rhythm

## 2020-08-17 NOTE — PROGRESS NOTE ADULT - ASSESSMENT
66 YO Leandro speaking Female with PMHx of Diffuse Cystitic Bronchiectasis (Pseudomonas and Stenotrophmonas on previous cx) on intermittent on Prednisone, Primary Ciliary Dyskinesia, Allergic Bronchopulmonary Aspergillosis, HTN, DM2 A1C 8.7 (8/2020) p/w worsening productive cough, SOB, CP and intermittent fever over last 2 weeks. CXR unchanged with BL hazy opacities. Duonebs and Solumedrol given and admitted to Medicine. During admission, chest PT needed with NC trended up to 5L and transferred to [facility] on 8/11

## 2020-08-18 DIAGNOSIS — R74.0 NONSPECIFIC ELEVATION OF LEVELS OF TRANSAMINASE AND LACTIC ACID DEHYDROGENASE [LDH]: ICD-10-CM

## 2020-08-18 LAB
ALBUMIN SERPL ELPH-MCNC: 3.4 G/DL — SIGNIFICANT CHANGE UP (ref 3.3–5)
ALP SERPL-CCNC: 165 U/L — HIGH (ref 40–120)
ALT FLD-CCNC: 187 U/L — HIGH (ref 4–33)
ANION GAP SERPL CALC-SCNC: 8 MMO/L — SIGNIFICANT CHANGE UP (ref 7–14)
AST SERPL-CCNC: 135 U/L — HIGH (ref 4–32)
BASOPHILS # BLD AUTO: 0.03 K/UL — SIGNIFICANT CHANGE UP (ref 0–0.2)
BASOPHILS NFR BLD AUTO: 0.4 % — SIGNIFICANT CHANGE UP (ref 0–2)
BILIRUB SERPL-MCNC: 0.3 MG/DL — SIGNIFICANT CHANGE UP (ref 0.2–1.2)
BUN SERPL-MCNC: 14 MG/DL — SIGNIFICANT CHANGE UP (ref 7–23)
CALCIUM SERPL-MCNC: 9.3 MG/DL — SIGNIFICANT CHANGE UP (ref 8.4–10.5)
CHLORIDE SERPL-SCNC: 98 MMOL/L — SIGNIFICANT CHANGE UP (ref 98–107)
CO2 SERPL-SCNC: 31 MMOL/L — SIGNIFICANT CHANGE UP (ref 22–31)
CREAT SERPL-MCNC: 0.42 MG/DL — LOW (ref 0.5–1.3)
EOSINOPHIL # BLD AUTO: 0.21 K/UL — SIGNIFICANT CHANGE UP (ref 0–0.5)
EOSINOPHIL NFR BLD AUTO: 2.5 % — SIGNIFICANT CHANGE UP (ref 0–6)
GGT SERPL-CCNC: 172 U/L — HIGH (ref 5–36)
GLUCOSE BLDC GLUCOMTR-MCNC: 119 MG/DL — HIGH (ref 70–99)
GLUCOSE BLDC GLUCOMTR-MCNC: 298 MG/DL — HIGH (ref 70–99)
GLUCOSE BLDC GLUCOMTR-MCNC: 320 MG/DL — HIGH (ref 70–99)
GLUCOSE BLDC GLUCOMTR-MCNC: 91 MG/DL — SIGNIFICANT CHANGE UP (ref 70–99)
GLUCOSE SERPL-MCNC: 140 MG/DL — HIGH (ref 70–99)
HCT VFR BLD CALC: 40.3 % — SIGNIFICANT CHANGE UP (ref 34.5–45)
HGB BLD-MCNC: 12.9 G/DL — SIGNIFICANT CHANGE UP (ref 11.5–15.5)
IMM GRANULOCYTES NFR BLD AUTO: 0.6 % — SIGNIFICANT CHANGE UP (ref 0–1.5)
LYMPHOCYTES # BLD AUTO: 2.6 K/UL — SIGNIFICANT CHANGE UP (ref 1–3.3)
LYMPHOCYTES # BLD AUTO: 31 % — SIGNIFICANT CHANGE UP (ref 13–44)
MAGNESIUM SERPL-MCNC: 2 MG/DL — SIGNIFICANT CHANGE UP (ref 1.6–2.6)
MCHC RBC-ENTMCNC: 28 PG — SIGNIFICANT CHANGE UP (ref 27–34)
MCHC RBC-ENTMCNC: 32 % — SIGNIFICANT CHANGE UP (ref 32–36)
MCV RBC AUTO: 87.4 FL — SIGNIFICANT CHANGE UP (ref 80–100)
MONOCYTES # BLD AUTO: 0.86 K/UL — SIGNIFICANT CHANGE UP (ref 0–0.9)
MONOCYTES NFR BLD AUTO: 10.2 % — SIGNIFICANT CHANGE UP (ref 2–14)
NEUTROPHILS # BLD AUTO: 4.65 K/UL — SIGNIFICANT CHANGE UP (ref 1.8–7.4)
NEUTROPHILS NFR BLD AUTO: 55.3 % — SIGNIFICANT CHANGE UP (ref 43–77)
NRBC # FLD: 0 K/UL — SIGNIFICANT CHANGE UP (ref 0–0)
PHOSPHATE SERPL-MCNC: 3.1 MG/DL — SIGNIFICANT CHANGE UP (ref 2.5–4.5)
PLATELET # BLD AUTO: 158 K/UL — SIGNIFICANT CHANGE UP (ref 150–400)
PMV BLD: 11.4 FL — SIGNIFICANT CHANGE UP (ref 7–13)
POTASSIUM SERPL-MCNC: 4.2 MMOL/L — SIGNIFICANT CHANGE UP (ref 3.5–5.3)
POTASSIUM SERPL-SCNC: 4.2 MMOL/L — SIGNIFICANT CHANGE UP (ref 3.5–5.3)
PROT SERPL-MCNC: 6.4 G/DL — SIGNIFICANT CHANGE UP (ref 6–8.3)
RBC # BLD: 4.61 M/UL — SIGNIFICANT CHANGE UP (ref 3.8–5.2)
RBC # FLD: 13.8 % — SIGNIFICANT CHANGE UP (ref 10.3–14.5)
SODIUM SERPL-SCNC: 137 MMOL/L — SIGNIFICANT CHANGE UP (ref 135–145)
WBC # BLD: 8.4 K/UL — SIGNIFICANT CHANGE UP (ref 3.8–10.5)
WBC # FLD AUTO: 8.4 K/UL — SIGNIFICANT CHANGE UP (ref 3.8–10.5)

## 2020-08-18 PROCEDURE — 99233 SBSQ HOSP IP/OBS HIGH 50: CPT | Mod: GC

## 2020-08-18 RX ORDER — ALBUTEROL 90 UG/1
2 AEROSOL, METERED ORAL EVERY 6 HOURS
Refills: 0 | Status: DISCONTINUED | OUTPATIENT
Start: 2020-08-18 | End: 2020-08-19

## 2020-08-18 RX ORDER — INSULIN LISPRO 100/ML
10 VIAL (ML) SUBCUTANEOUS
Refills: 0 | Status: DISCONTINUED | OUTPATIENT
Start: 2020-08-18 | End: 2020-08-19

## 2020-08-18 RX ORDER — INSULIN LISPRO 100/ML
6 VIAL (ML) SUBCUTANEOUS
Refills: 0 | Status: DISCONTINUED | OUTPATIENT
Start: 2020-08-18 | End: 2020-08-19

## 2020-08-18 RX ADMIN — POLYETHYLENE GLYCOL 3350 17 GRAM(S): 17 POWDER, FOR SOLUTION ORAL at 06:32

## 2020-08-18 RX ADMIN — Medication 60 MILLIGRAM(S): at 06:32

## 2020-08-18 RX ADMIN — Medication 37.5 MILLIGRAM(S): at 11:42

## 2020-08-18 RX ADMIN — SODIUM CHLORIDE 3 MILLILITER(S): 9 INJECTION INTRAMUSCULAR; INTRAVENOUS; SUBCUTANEOUS at 22:15

## 2020-08-18 RX ADMIN — BUDESONIDE AND FORMOTEROL FUMARATE DIHYDRATE 2 PUFF(S): 160; 4.5 AEROSOL RESPIRATORY (INHALATION) at 08:05

## 2020-08-18 RX ADMIN — LEVALBUTEROL 0.63 MILLIGRAM(S): 1.25 SOLUTION, CONCENTRATE RESPIRATORY (INHALATION) at 08:04

## 2020-08-18 RX ADMIN — Medication 300 MILLIGRAM(S): at 22:25

## 2020-08-18 RX ADMIN — Medication 100 MILLIGRAM(S): at 06:38

## 2020-08-18 RX ADMIN — Medication 6: at 11:42

## 2020-08-18 RX ADMIN — Medication 10 UNIT(S): at 17:21

## 2020-08-18 RX ADMIN — CHLORHEXIDINE GLUCONATE 1 APPLICATION(S): 213 SOLUTION TOPICAL at 11:41

## 2020-08-18 RX ADMIN — Medication 8 UNIT(S): at 11:42

## 2020-08-18 RX ADMIN — Medication 8: at 17:21

## 2020-08-18 RX ADMIN — POLYETHYLENE GLYCOL 3350 17 GRAM(S): 17 POWDER, FOR SOLUTION ORAL at 17:22

## 2020-08-18 RX ADMIN — BUDESONIDE AND FORMOTEROL FUMARATE DIHYDRATE 2 PUFF(S): 160; 4.5 AEROSOL RESPIRATORY (INHALATION) at 22:32

## 2020-08-18 RX ADMIN — Medication 100 MILLIGRAM(S): at 17:22

## 2020-08-18 RX ADMIN — Medication 1 TABLET(S): at 11:42

## 2020-08-18 RX ADMIN — SODIUM CHLORIDE 3 MILLILITER(S): 9 INJECTION INTRAMUSCULAR; INTRAVENOUS; SUBCUTANEOUS at 16:06

## 2020-08-18 RX ADMIN — ALBUTEROL 2 PUFF(S): 90 AEROSOL, METERED ORAL at 22:14

## 2020-08-18 RX ADMIN — SIMETHICONE 80 MILLIGRAM(S): 80 TABLET, CHEWABLE ORAL at 11:53

## 2020-08-18 RX ADMIN — INSULIN GLARGINE 16 UNIT(S): 100 INJECTION, SOLUTION SUBCUTANEOUS at 21:29

## 2020-08-18 RX ADMIN — SODIUM CHLORIDE 3 MILLILITER(S): 9 INJECTION INTRAMUSCULAR; INTRAVENOUS; SUBCUTANEOUS at 04:15

## 2020-08-18 RX ADMIN — Medication 8 UNIT(S): at 07:56

## 2020-08-18 RX ADMIN — Medication 300 MILLIGRAM(S): at 08:10

## 2020-08-18 RX ADMIN — SODIUM CHLORIDE 3 MILLILITER(S): 9 INJECTION INTRAMUSCULAR; INTRAVENOUS; SUBCUTANEOUS at 08:10

## 2020-08-18 RX ADMIN — ALBUTEROL 2 PUFF(S): 90 AEROSOL, METERED ORAL at 16:06

## 2020-08-18 RX ADMIN — ENOXAPARIN SODIUM 40 MILLIGRAM(S): 100 INJECTION SUBCUTANEOUS at 11:41

## 2020-08-18 RX ADMIN — LEVALBUTEROL 0.63 MILLIGRAM(S): 1.25 SOLUTION, CONCENTRATE RESPIRATORY (INHALATION) at 04:08

## 2020-08-18 RX ADMIN — AMLODIPINE BESYLATE 5 MILLIGRAM(S): 2.5 TABLET ORAL at 06:32

## 2020-08-18 RX ADMIN — LEVALBUTEROL 0.63 MILLIGRAM(S): 1.25 SOLUTION, CONCENTRATE RESPIRATORY (INHALATION) at 00:01

## 2020-08-18 NOTE — PROGRESS NOTE ADULT - CONSTITUTIONAL DETAILS
respiratory distress
well-developed/well-nourished/well-groomed
respiratory distress
well-nourished/well-groomed/no distress
no distress/well-groomed

## 2020-08-18 NOTE — PROGRESS NOTE ADULT - RS GEN PE MLT RESP DETAILS PC
airway patent/Decreased wheezing/breath sounds equal
breath sounds equal/good air movement/airway patent/respirations non-labored
breath sounds equal/airway patent
airway patent/wheezes/rhonchi/respirations non-labored
respirations non-labored/wheezes/rhonchi/breath sounds equal

## 2020-08-18 NOTE — PROGRESS NOTE ADULT - ATTENDING COMMENTS
Bronchiectasis exacerbation.  Wheezing improved.  Start tapering steroids.  Continue airway clearance.  Room air saturation 87%. Will need home oxygen. Bronchiectasis exacerbation.  Wheezing improved.  Start tapering steroids.  Continue airway clearance.  Room air saturation 87% at rest. Will need home oxygen.

## 2020-08-18 NOTE — PROGRESS NOTE ADULT - GASTROINTESTINAL DETAILS
no distention/soft
no distention/soft/nontender
soft/no distention
no distention/nontender/bowel sounds normal
no distention/bowel sounds normal

## 2020-08-18 NOTE — PROGRESS NOTE ADULT - PROBLEM SELECTOR PLAN 7
- In the setting of cirrhotic liver (US from 1/2020) and current use of Levaquin (6 more days)  - HCV weakly positive 1/2020 w/ no detection of RNA  - Bilirubin WNL and no abdominal pain; Monitor levels

## 2020-08-18 NOTE — PROGRESS NOTE ADULT - SUBJECTIVE AND OBJECTIVE BOX
CHIEF COMPLAINT: SOB/Cough     Interval Events: None     REVIEW OF SYSTEMS:  Constitutional:   Eyes:  ENT:  CV:  Resp:  GI:  :  MSK:  Integumentary:  Neurological:  Psychiatric:  Endocrine:  Hematologic/Lymphatic:  Allergic/Immunologic:  [ ] All other systems negative    OBJECTIVE:  ICU Vital Signs Last 24 Hrs  T(C): 36 (18 Aug 2020 06:29), Max: 36.8 (17 Aug 2020 15:36)  T(F): 96.8 (18 Aug 2020 06:29), Max: 98.3 (17 Aug 2020 15:36)  HR: 77 (18 Aug 2020 06:29) (74 - 88)  BP: 124/74 (18 Aug 2020 06:29) (124/74 - 143/64)  RR: 18 (18 Aug 2020 06:29) (17 - 20)  SpO2: 95% (18 Aug 2020 06:29) (89% - 99%)    POCT Blood Glucose.: 132 mg/dL (17 Aug 2020 21:35)    HOSPITAL MEDICATIONS:  MEDICATIONS  (STANDING):  amLODIPine   Tablet 5 milliGRAM(s) Oral daily  budesonide 160 MICROgram(s)/formoterol 4.5 MICROgram(s) Inhaler 2 Puff(s) Inhalation two times a day  chlorhexidine 4% Liquid 1 Application(s) Topical daily  dextrose 5%. 1000 milliLiter(s) (50 mL/Hr) IV Continuous <Continuous>  dextrose 50% Injectable 12.5 Gram(s) IV Push once  dextrose 50% Injectable 25 Gram(s) IV Push once  dextrose 50% Injectable 25 Gram(s) IV Push once  enoxaparin Injectable 40 milliGRAM(s) SubCutaneous daily  insulin glargine Injectable (LANTUS) 16 Unit(s) SubCutaneous at bedtime  insulin lispro (HumaLOG) corrective regimen sliding scale   SubCutaneous at bedtime  insulin lispro (HumaLOG) corrective regimen sliding scale   SubCutaneous three times a day before meals  insulin lispro Injectable (HumaLOG) 8 Unit(s) SubCutaneous three times a day before meals  levalbuterol Inhalation 0.63 milliGRAM(s) Inhalation every 4 hours  levoFLOXacin  Tablet 750 milliGRAM(s) Oral every 24 hours  multivitamin 1 Tablet(s) Oral daily  polyethylene glycol 3350 17 Gram(s) Oral two times a day  predniSONE   Tablet 60 milliGRAM(s) Oral daily  sodium chloride 3%  Inhalation 3 milliLiter(s) Inhalation every 6 hours  tobramycin for Nebulization 300 milliGRAM(s) Inhalation every 12 hours  venlafaxine XR. 37.5 milliGRAM(s) Oral daily    MEDICATIONS  (PRN):  benzocaine 15 mG/menthol 3.6 mG (Sugar-Free) Lozenge 1 Lozenge Oral every 4 hours PRN Sore Throat  benzonatate 100 milliGRAM(s) Oral every 8 hours PRN Cough  dextrose 40% Gel 15 Gram(s) Oral once PRN Blood Glucose LESS THAN 70 milliGRAM(s)/deciliter  fluticasone propionate 50 MICROgram(s)/spray Nasal Spray 1 Spray(s) Both Nostrils two times a day PRN nasal congestion  glucagon  Injectable 1 milliGRAM(s) IntraMuscular once PRN Glucose LESS THAN 70 milligrams/deciliter  guaiFENesin   Syrup  (Sugar-Free) 200 milliGRAM(s) Oral every 6 hours PRN Cough  hydrocodone/homatropine Syrup 5 milliLiter(s) Oral at bedtime PRN Severe Cough  simethicone 80 milliGRAM(s) Chew every 12 hours PRN Gas    LABS:                        12.9   8.40  )-----------( 158      ( 18 Aug 2020 06:10 )             40.3     08-18    137  |  98  |  14  ----------------------------<  140<H>  4.2   |  31  |  0.42<L>    Ca    9.3      18 Aug 2020 06:10  Phos  3.1     08-18  Mg     2.0     08-18    TPro  6.4  /  Alb  3.4  /  TBili  0.3  /  DBili  x   /  AST  135<H>  /  ALT  187<H>  /  AlkPhos  165<H>  08-18 CHIEF COMPLAINT: SOB/Cough     Interval Events: None     REVIEW OF SYSTEMS:  Constitutional: Weak  Resp: Cough/SOB  [X] All other systems negative    OBJECTIVE:  ICU Vital Signs Last 24 Hrs  T(C): 36 (18 Aug 2020 06:29), Max: 36.8 (17 Aug 2020 15:36)  T(F): 96.8 (18 Aug 2020 06:29), Max: 98.3 (17 Aug 2020 15:36)  HR: 77 (18 Aug 2020 06:29) (74 - 88)  BP: 124/74 (18 Aug 2020 06:29) (124/74 - 143/64)  RR: 18 (18 Aug 2020 06:29) (17 - 20)  SpO2: 95% (18 Aug 2020 06:29) (89% - 99%)    POCT Blood Glucose.: 132 mg/dL (17 Aug 2020 21:35)    HOSPITAL MEDICATIONS:  MEDICATIONS  (STANDING):  amLODIPine   Tablet 5 milliGRAM(s) Oral daily  budesonide 160 MICROgram(s)/formoterol 4.5 MICROgram(s) Inhaler 2 Puff(s) Inhalation two times a day  chlorhexidine 4% Liquid 1 Application(s) Topical daily  dextrose 5%. 1000 milliLiter(s) (50 mL/Hr) IV Continuous <Continuous>  dextrose 50% Injectable 12.5 Gram(s) IV Push once  dextrose 50% Injectable 25 Gram(s) IV Push once  dextrose 50% Injectable 25 Gram(s) IV Push once  enoxaparin Injectable 40 milliGRAM(s) SubCutaneous daily  insulin glargine Injectable (LANTUS) 16 Unit(s) SubCutaneous at bedtime  insulin lispro (HumaLOG) corrective regimen sliding scale   SubCutaneous at bedtime  insulin lispro (HumaLOG) corrective regimen sliding scale   SubCutaneous three times a day before meals  insulin lispro Injectable (HumaLOG) 8 Unit(s) SubCutaneous three times a day before meals  levalbuterol Inhalation 0.63 milliGRAM(s) Inhalation every 4 hours  levoFLOXacin  Tablet 750 milliGRAM(s) Oral every 24 hours  multivitamin 1 Tablet(s) Oral daily  polyethylene glycol 3350 17 Gram(s) Oral two times a day  predniSONE   Tablet 60 milliGRAM(s) Oral daily  sodium chloride 3%  Inhalation 3 milliLiter(s) Inhalation every 6 hours  tobramycin for Nebulization 300 milliGRAM(s) Inhalation every 12 hours  venlafaxine XR. 37.5 milliGRAM(s) Oral daily    MEDICATIONS  (PRN):  benzocaine 15 mG/menthol 3.6 mG (Sugar-Free) Lozenge 1 Lozenge Oral every 4 hours PRN Sore Throat  benzonatate 100 milliGRAM(s) Oral every 8 hours PRN Cough  dextrose 40% Gel 15 Gram(s) Oral once PRN Blood Glucose LESS THAN 70 milliGRAM(s)/deciliter  fluticasone propionate 50 MICROgram(s)/spray Nasal Spray 1 Spray(s) Both Nostrils two times a day PRN nasal congestion  glucagon  Injectable 1 milliGRAM(s) IntraMuscular once PRN Glucose LESS THAN 70 milligrams/deciliter  guaiFENesin   Syrup  (Sugar-Free) 200 milliGRAM(s) Oral every 6 hours PRN Cough  hydrocodone/homatropine Syrup 5 milliLiter(s) Oral at bedtime PRN Severe Cough  simethicone 80 milliGRAM(s) Chew every 12 hours PRN Gas    LABS:                        12.9   8.40  )-----------( 158      ( 18 Aug 2020 06:10 )             40.3     08-18    137  |  98  |  14  ----------------------------<  140<H>  4.2   |  31  |  0.42<L>    Ca    9.3      18 Aug 2020 06:10  Phos  3.1     08-18  Mg     2.0     08-18    TPro  6.4  /  Alb  3.4  /  TBili  0.3  /  DBili  x   /  AST  135<H>  /  ALT  187<H>  /  AlkPhos  165<H>  08-18

## 2020-08-19 ENCOUNTER — TRANSCRIPTION ENCOUNTER (OUTPATIENT)
Age: 65
End: 2020-08-19

## 2020-08-19 VITALS — OXYGEN SATURATION: 98 %

## 2020-08-19 LAB
ALBUMIN SERPL ELPH-MCNC: 3.3 G/DL — SIGNIFICANT CHANGE UP (ref 3.3–5)
ALP SERPL-CCNC: 161 U/L — HIGH (ref 40–120)
ALT FLD-CCNC: 185 U/L — HIGH (ref 4–33)
ANION GAP SERPL CALC-SCNC: 9 MMO/L — SIGNIFICANT CHANGE UP (ref 7–14)
AST SERPL-CCNC: 118 U/L — HIGH (ref 4–32)
BILIRUB SERPL-MCNC: 0.3 MG/DL — SIGNIFICANT CHANGE UP (ref 0.2–1.2)
BUN SERPL-MCNC: 21 MG/DL — SIGNIFICANT CHANGE UP (ref 7–23)
CALCIUM SERPL-MCNC: 9.3 MG/DL — SIGNIFICANT CHANGE UP (ref 8.4–10.5)
CHLORIDE SERPL-SCNC: 97 MMOL/L — LOW (ref 98–107)
CO2 SERPL-SCNC: 30 MMOL/L — SIGNIFICANT CHANGE UP (ref 22–31)
CREAT SERPL-MCNC: 0.5 MG/DL — SIGNIFICANT CHANGE UP (ref 0.5–1.3)
GLUCOSE BLDC GLUCOMTR-MCNC: 186 MG/DL — HIGH (ref 70–99)
GLUCOSE BLDC GLUCOMTR-MCNC: 242 MG/DL — HIGH (ref 70–99)
GLUCOSE SERPL-MCNC: 134 MG/DL — HIGH (ref 70–99)
HCT VFR BLD CALC: 41.4 % — SIGNIFICANT CHANGE UP (ref 34.5–45)
HGB BLD-MCNC: 12.5 G/DL — SIGNIFICANT CHANGE UP (ref 11.5–15.5)
MAGNESIUM SERPL-MCNC: 2.1 MG/DL — SIGNIFICANT CHANGE UP (ref 1.6–2.6)
MCHC RBC-ENTMCNC: 27 PG — SIGNIFICANT CHANGE UP (ref 27–34)
MCHC RBC-ENTMCNC: 30.2 % — LOW (ref 32–36)
MCV RBC AUTO: 89.4 FL — SIGNIFICANT CHANGE UP (ref 80–100)
NRBC # FLD: 0 K/UL — SIGNIFICANT CHANGE UP (ref 0–0)
PHOSPHATE SERPL-MCNC: 3 MG/DL — SIGNIFICANT CHANGE UP (ref 2.5–4.5)
PLATELET # BLD AUTO: 158 K/UL — SIGNIFICANT CHANGE UP (ref 150–400)
PMV BLD: 11.4 FL — SIGNIFICANT CHANGE UP (ref 7–13)
POTASSIUM SERPL-MCNC: 4 MMOL/L — SIGNIFICANT CHANGE UP (ref 3.5–5.3)
POTASSIUM SERPL-SCNC: 4 MMOL/L — SIGNIFICANT CHANGE UP (ref 3.5–5.3)
PROT SERPL-MCNC: 6.3 G/DL — SIGNIFICANT CHANGE UP (ref 6–8.3)
RBC # BLD: 4.63 M/UL — SIGNIFICANT CHANGE UP (ref 3.8–5.2)
RBC # FLD: 13.7 % — SIGNIFICANT CHANGE UP (ref 10.3–14.5)
SODIUM SERPL-SCNC: 136 MMOL/L — SIGNIFICANT CHANGE UP (ref 135–145)
WBC # BLD: 8.29 K/UL — SIGNIFICANT CHANGE UP (ref 3.8–10.5)
WBC # FLD AUTO: 8.29 K/UL — SIGNIFICANT CHANGE UP (ref 3.8–10.5)

## 2020-08-19 PROCEDURE — 99233 SBSQ HOSP IP/OBS HIGH 50: CPT | Mod: GC

## 2020-08-19 RX ORDER — VENLAFAXINE HCL 75 MG
1 CAPSULE, EXT RELEASE 24 HR ORAL
Qty: 30 | Refills: 0
Start: 2020-08-19 | End: 2020-09-17

## 2020-08-19 RX ORDER — TOBRAMYCIN SULFATE 40 MG/ML
4 VIAL (ML) INJECTION
Qty: 0 | Refills: 0 | DISCHARGE
Start: 2020-08-19

## 2020-08-19 RX ORDER — TOBRAMYCIN SULFATE 40 MG/ML
300 VIAL (ML) INJECTION EVERY 12 HOURS
Refills: 0 | Status: DISCONTINUED | OUTPATIENT
Start: 2020-08-19 | End: 2020-08-19

## 2020-08-19 RX ORDER — SERTRALINE 25 MG/1
1 TABLET, FILM COATED ORAL
Qty: 0 | Refills: 0 | DISCHARGE

## 2020-08-19 RX ORDER — TOBRAMYCIN SULFATE 40 MG/ML
5 VIAL (ML) INJECTION
Qty: 0 | Refills: 0 | DISCHARGE

## 2020-08-19 RX ADMIN — Medication 6 UNIT(S): at 07:47

## 2020-08-19 RX ADMIN — SODIUM CHLORIDE 3 MILLILITER(S): 9 INJECTION INTRAMUSCULAR; INTRAVENOUS; SUBCUTANEOUS at 16:12

## 2020-08-19 RX ADMIN — CHLORHEXIDINE GLUCONATE 1 APPLICATION(S): 213 SOLUTION TOPICAL at 11:55

## 2020-08-19 RX ADMIN — Medication 2: at 07:47

## 2020-08-19 RX ADMIN — Medication 60 MILLIGRAM(S): at 05:40

## 2020-08-19 RX ADMIN — ALBUTEROL 2 PUFF(S): 90 AEROSOL, METERED ORAL at 04:20

## 2020-08-19 RX ADMIN — SODIUM CHLORIDE 3 MILLILITER(S): 9 INJECTION INTRAMUSCULAR; INTRAVENOUS; SUBCUTANEOUS at 11:02

## 2020-08-19 RX ADMIN — Medication 1 TABLET(S): at 11:56

## 2020-08-19 RX ADMIN — SODIUM CHLORIDE 3 MILLILITER(S): 9 INJECTION INTRAMUSCULAR; INTRAVENOUS; SUBCUTANEOUS at 04:21

## 2020-08-19 RX ADMIN — Medication 37.5 MILLIGRAM(S): at 11:55

## 2020-08-19 RX ADMIN — Medication 10 UNIT(S): at 11:53

## 2020-08-19 RX ADMIN — POLYETHYLENE GLYCOL 3350 17 GRAM(S): 17 POWDER, FOR SOLUTION ORAL at 05:40

## 2020-08-19 RX ADMIN — BUDESONIDE AND FORMOTEROL FUMARATE DIHYDRATE 2 PUFF(S): 160; 4.5 AEROSOL RESPIRATORY (INHALATION) at 11:03

## 2020-08-19 RX ADMIN — AMLODIPINE BESYLATE 5 MILLIGRAM(S): 2.5 TABLET ORAL at 05:40

## 2020-08-19 RX ADMIN — ENOXAPARIN SODIUM 40 MILLIGRAM(S): 100 INJECTION SUBCUTANEOUS at 11:55

## 2020-08-19 RX ADMIN — ALBUTEROL 2 PUFF(S): 90 AEROSOL, METERED ORAL at 16:07

## 2020-08-19 RX ADMIN — SIMETHICONE 80 MILLIGRAM(S): 80 TABLET, CHEWABLE ORAL at 07:51

## 2020-08-19 RX ADMIN — Medication 4: at 11:53

## 2020-08-19 RX ADMIN — ALBUTEROL 2 PUFF(S): 90 AEROSOL, METERED ORAL at 10:59

## 2020-08-19 NOTE — DISCHARGE NOTE NURSING/CASE MANAGEMENT/SOCIAL WORK - NSSCTYPOFSERV_GEN_ALL_CORE
Visiting Nurse Services. The 1st Visiting Nurse Visit is for Thursday 8/20/20. The Nurse will call to arrange the Visit time. Visiting Nurse Services & Home Physical Therapy. The 1st Visiting Nurse Visit is for Thursday 8/20/20. The Nurse will call to arrange the Visit time.

## 2020-08-19 NOTE — DISCHARGE NOTE NURSING/CASE MANAGEMENT/SOCIAL WORK - PATIENT PORTAL LINK FT
You can access the FollowMyHealth Patient Portal offered by [facility] by registering at the following website: [email or web address]. By joining [facility]’s FollowMyHealth portal, you will also be able to view your health information using other applications (apps) compatible with our system.

## 2020-08-19 NOTE — PROGRESS NOTE ADULT - SUBJECTIVE AND OBJECTIVE BOX
CHIEF COMPLAINT:    SUBJECTIVE:     [ ] All other systems negative  [ ] Unable to assess ROS because ________    OBJECTIVE:  ICU Vital Signs Last 24 Hrs  T(C): 36.3 (19 Aug 2020 05:37), Max: 36.4 (18 Aug 2020 13:30)  T(F): 97.4 (19 Aug 2020 05:37), Max: 97.6 (18 Aug 2020 13:30)  HR: 90 (19 Aug 2020 05:37) (80 - 92)  BP: 114/61 (19 Aug 2020 05:37) (114/61 - 135/66)  BP(mean): --  ABP: --  ABP(mean): --  RR: 18 (19 Aug 2020 05:37) (18 - 19)  SpO2: 99% (19 Aug 2020 05:37) (94% - 99%)        CAPILLARY BLOOD GLUCOSE      POCT Blood Glucose.: 186 mg/dL (19 Aug 2020 07:42)      PHYSICAL EXAM:  General:   HEENT:   Lymph Nodes:  Neck:   Respiratory:   Cardiovascular:   Abdomen:   Extremities:   Skin:   Neurological:  Psychiatry:    HOSPITAL MEDICATIONS:  MEDICATIONS  (STANDING):  ALBUTerol    90 MICROgram(s) HFA Inhaler 2 Puff(s) Inhalation every 6 hours  amLODIPine   Tablet 5 milliGRAM(s) Oral daily  budesonide 160 MICROgram(s)/formoterol 4.5 MICROgram(s) Inhaler 2 Puff(s) Inhalation two times a day  chlorhexidine 4% Liquid 1 Application(s) Topical daily  dextrose 5%. 1000 milliLiter(s) (50 mL/Hr) IV Continuous <Continuous>  dextrose 50% Injectable 12.5 Gram(s) IV Push once  dextrose 50% Injectable 25 Gram(s) IV Push once  dextrose 50% Injectable 25 Gram(s) IV Push once  enoxaparin Injectable 40 milliGRAM(s) SubCutaneous daily  insulin glargine Injectable (LANTUS) 16 Unit(s) SubCutaneous at bedtime  insulin lispro (HumaLOG) corrective regimen sliding scale   SubCutaneous at bedtime  insulin lispro (HumaLOG) corrective regimen sliding scale   SubCutaneous three times a day before meals  insulin lispro Injectable (HumaLOG) 10 Unit(s) SubCutaneous before lunch  insulin lispro Injectable (HumaLOG) 10 Unit(s) SubCutaneous before dinner  insulin lispro Injectable (HumaLOG) 6 Unit(s) SubCutaneous before breakfast  multivitamin 1 Tablet(s) Oral daily  polyethylene glycol 3350 17 Gram(s) Oral two times a day  predniSONE   Tablet 60 milliGRAM(s) Oral daily  sodium chloride 3%  Inhalation 3 milliLiter(s) Inhalation every 6 hours  venlafaxine XR. 37.5 milliGRAM(s) Oral daily    MEDICATIONS  (PRN):  benzocaine 15 mG/menthol 3.6 mG (Sugar-Free) Lozenge 1 Lozenge Oral every 4 hours PRN Sore Throat  benzonatate 100 milliGRAM(s) Oral every 8 hours PRN Cough  dextrose 40% Gel 15 Gram(s) Oral once PRN Blood Glucose LESS THAN 70 milliGRAM(s)/deciliter  fluticasone propionate 50 MICROgram(s)/spray Nasal Spray 1 Spray(s) Both Nostrils two times a day PRN nasal congestion  glucagon  Injectable 1 milliGRAM(s) IntraMuscular once PRN Glucose LESS THAN 70 milligrams/deciliter  guaiFENesin   Syrup  (Sugar-Free) 200 milliGRAM(s) Oral every 6 hours PRN Cough  hydrocodone/homatropine Syrup 5 milliLiter(s) Oral at bedtime PRN Severe Cough  simethicone 80 milliGRAM(s) Chew every 12 hours PRN Gas      LABS:                        12.5   8.29  )-----------( 158      ( 19 Aug 2020 03:10 )             41.4     08-19    136  |  97<L>  |  21  ----------------------------<  134<H>  4.0   |  30  |  0.50    Ca    9.3      19 Aug 2020 03:10  Phos  3.0     08-19  Mg     2.1     08-19    TPro  6.3  /  Alb  3.3  /  TBili  0.3  /  DBili  x   /  AST  118<H>  /  ALT  185<H>  /  AlkPhos  161<H>  08-19              MICROBIOLOGY:     RADIOLOGY:  [ ] Reviewed and interpreted by me    PULMONARY FUNCTION TESTS:    EKG: CHIEF COMPLAINT:    SUBJECTIVE:     [x] All other systems negative  [ ] Unable to assess ROS because ________    OBJECTIVE:  ICU Vital Signs Last 24 Hrs  T(C): 36.3 (19 Aug 2020 05:37), Max: 36.4 (18 Aug 2020 13:30)  T(F): 97.4 (19 Aug 2020 05:37), Max: 97.6 (18 Aug 2020 13:30)  HR: 90 (19 Aug 2020 05:37) (80 - 92)  BP: 114/61 (19 Aug 2020 05:37) (114/61 - 135/66)  BP(mean): --  ABP: --  ABP(mean): --  RR: 18 (19 Aug 2020 05:37) (18 - 19)  SpO2: 99% (19 Aug 2020 05:37) (94% - 99%)        CAPILLARY BLOOD GLUCOSE      POCT Blood Glucose.: 186 mg/dL (19 Aug 2020 07:42)      PHYSICAL EXAM:  General:   HEENT:   Lymph Nodes:  Neck:   Respiratory:   Cardiovascular:   Abdomen:   Extremities:   Skin:   Neurological:  Psychiatry:    HOSPITAL MEDICATIONS:  MEDICATIONS  (STANDING):  ALBUTerol    90 MICROgram(s) HFA Inhaler 2 Puff(s) Inhalation every 6 hours  amLODIPine   Tablet 5 milliGRAM(s) Oral daily  budesonide 160 MICROgram(s)/formoterol 4.5 MICROgram(s) Inhaler 2 Puff(s) Inhalation two times a day  chlorhexidine 4% Liquid 1 Application(s) Topical daily  dextrose 5%. 1000 milliLiter(s) (50 mL/Hr) IV Continuous <Continuous>  dextrose 50% Injectable 12.5 Gram(s) IV Push once  dextrose 50% Injectable 25 Gram(s) IV Push once  dextrose 50% Injectable 25 Gram(s) IV Push once  enoxaparin Injectable 40 milliGRAM(s) SubCutaneous daily  insulin glargine Injectable (LANTUS) 16 Unit(s) SubCutaneous at bedtime  insulin lispro (HumaLOG) corrective regimen sliding scale   SubCutaneous at bedtime  insulin lispro (HumaLOG) corrective regimen sliding scale   SubCutaneous three times a day before meals  insulin lispro Injectable (HumaLOG) 10 Unit(s) SubCutaneous before lunch  insulin lispro Injectable (HumaLOG) 10 Unit(s) SubCutaneous before dinner  insulin lispro Injectable (HumaLOG) 6 Unit(s) SubCutaneous before breakfast  multivitamin 1 Tablet(s) Oral daily  polyethylene glycol 3350 17 Gram(s) Oral two times a day  predniSONE   Tablet 60 milliGRAM(s) Oral daily  sodium chloride 3%  Inhalation 3 milliLiter(s) Inhalation every 6 hours  venlafaxine XR. 37.5 milliGRAM(s) Oral daily    MEDICATIONS  (PRN):  benzocaine 15 mG/menthol 3.6 mG (Sugar-Free) Lozenge 1 Lozenge Oral every 4 hours PRN Sore Throat  benzonatate 100 milliGRAM(s) Oral every 8 hours PRN Cough  dextrose 40% Gel 15 Gram(s) Oral once PRN Blood Glucose LESS THAN 70 milliGRAM(s)/deciliter  fluticasone propionate 50 MICROgram(s)/spray Nasal Spray 1 Spray(s) Both Nostrils two times a day PRN nasal congestion  glucagon  Injectable 1 milliGRAM(s) IntraMuscular once PRN Glucose LESS THAN 70 milligrams/deciliter  guaiFENesin   Syrup  (Sugar-Free) 200 milliGRAM(s) Oral every 6 hours PRN Cough  hydrocodone/homatropine Syrup 5 milliLiter(s) Oral at bedtime PRN Severe Cough  simethicone 80 milliGRAM(s) Chew every 12 hours PRN Gas      LABS:                        12.5   8.29  )-----------( 158      ( 19 Aug 2020 03:10 )             41.4     08-19    136  |  97<L>  |  21  ----------------------------<  134<H>  4.0   |  30  |  0.50    Ca    9.3      19 Aug 2020 03:10  Phos  3.0     08-19  Mg     2.1     08-19    TPro  6.3  /  Alb  3.3  /  TBili  0.3  /  DBili  x   /  AST  118<H>  /  ALT  185<H>  /  AlkPhos  161<H>  08-19              MICROBIOLOGY:     RADIOLOGY:  [ ] Reviewed and interpreted by me    PULMONARY FUNCTION TESTS:    EKG: CHIEF COMPLAINT: Patient is a 65y old  Female who presents with a chief complaint of Bronchiectasis Exacerbation (19 Aug 2020 09:08)    SUBJECTIVE: No interval events overnight. Notes improvement in SOB. Denies HA, CP or abdominal pain.     OBJECTIVE:  ICU Vital Signs Last 24 Hrs  T(C): 36.3 (19 Aug 2020 05:37), Max: 36.4 (18 Aug 2020 13:30)  T(F): 97.4 (19 Aug 2020 05:37), Max: 97.6 (18 Aug 2020 13:30)  HR: 90 (19 Aug 2020 05:37) (80 - 92)  BP: 114/61 (19 Aug 2020 05:37) (114/61 - 135/66)  BP(mean): --  ABP: --  ABP(mean): --  RR: 18 (19 Aug 2020 05:37) (18 - 19)  SpO2: 99% (19 Aug 2020 05:37) (94% - 99%)    CAPILLARY BLOOD GLUCOSE  POCT Blood Glucose.: 186 mg/dL (19 Aug 2020 07:42)    PHYSICAL EXAM:  General: NAD   Cards: S1/S2, no murmurs   Pulm: (+) Rhonchi but improved.   Abdomen: Soft, NTND. BS (+)   Extremities: No pedal edema. CATRINA of BL upper and lower extremities.  Neurology: AOx3 with no focal neurological deficits     HOSPITAL MEDICATIONS:  MEDICATIONS  (STANDING):  ALBUTerol    90 MICROgram(s) HFA Inhaler 2 Puff(s) Inhalation every 6 hours  amLODIPine   Tablet 5 milliGRAM(s) Oral daily  budesonide 160 MICROgram(s)/formoterol 4.5 MICROgram(s) Inhaler 2 Puff(s) Inhalation two times a day  chlorhexidine 4% Liquid 1 Application(s) Topical daily  dextrose 5%. 1000 milliLiter(s) (50 mL/Hr) IV Continuous <Continuous>  dextrose 50% Injectable 12.5 Gram(s) IV Push once  dextrose 50% Injectable 25 Gram(s) IV Push once  dextrose 50% Injectable 25 Gram(s) IV Push once  enoxaparin Injectable 40 milliGRAM(s) SubCutaneous daily  insulin glargine Injectable (LANTUS) 16 Unit(s) SubCutaneous at bedtime  insulin lispro (HumaLOG) corrective regimen sliding scale   SubCutaneous at bedtime  insulin lispro (HumaLOG) corrective regimen sliding scale   SubCutaneous three times a day before meals  insulin lispro Injectable (HumaLOG) 10 Unit(s) SubCutaneous before lunch  insulin lispro Injectable (HumaLOG) 10 Unit(s) SubCutaneous before dinner  insulin lispro Injectable (HumaLOG) 6 Unit(s) SubCutaneous before breakfast  multivitamin 1 Tablet(s) Oral daily  polyethylene glycol 3350 17 Gram(s) Oral two times a day  predniSONE   Tablet 60 milliGRAM(s) Oral daily  sodium chloride 3%  Inhalation 3 milliLiter(s) Inhalation every 6 hours  venlafaxine XR. 37.5 milliGRAM(s) Oral daily    MEDICATIONS  (PRN):  benzocaine 15 mG/menthol 3.6 mG (Sugar-Free) Lozenge 1 Lozenge Oral every 4 hours PRN Sore Throat  benzonatate 100 milliGRAM(s) Oral every 8 hours PRN Cough  dextrose 40% Gel 15 Gram(s) Oral once PRN Blood Glucose LESS THAN 70 milliGRAM(s)/deciliter  fluticasone propionate 50 MICROgram(s)/spray Nasal Spray 1 Spray(s) Both Nostrils two times a day PRN nasal congestion  glucagon  Injectable 1 milliGRAM(s) IntraMuscular once PRN Glucose LESS THAN 70 milligrams/deciliter  guaiFENesin   Syrup  (Sugar-Free) 200 milliGRAM(s) Oral every 6 hours PRN Cough  hydrocodone/homatropine Syrup 5 milliLiter(s) Oral at bedtime PRN Severe Cough  simethicone 80 milliGRAM(s) Chew every 12 hours PRN Gas    LABS:                        12.5   8.29  )-----------( 158      ( 19 Aug 2020 03:10 )             41.4     08-19    136  |  97<L>  |  21  ----------------------------<  134<H>  4.0   |  30  |  0.50    Ca    9.3      19 Aug 2020 03:10  Phos  3.0     08-19  Mg     2.1     08-19    TPro  6.3  /  Alb  3.3  /  TBili  0.3  /  DBili  x   /  AST  118<H>  /  ALT  185<H>  /  AlkPhos  161<H>  08-19    MICROBIOLOGY:     RADIOLOGY:  [ ] Reviewed and interpreted by me    PULMONARY FUNCTION TESTS:    EKG:

## 2020-08-19 NOTE — DISCHARGE NOTE NURSING/CASE MANAGEMENT/SOCIAL WORK - NSDCDMETYPESERV_GEN_ALL_CORE_FT
A Home Oxygen Concentrator will be delivered to your home after you arrive home today. A  Portable Oxygen Concentrator was delivered to your Hospital Room for Transport Home. A Rollator walker was also delivered to your hospital room to bring home.

## 2020-08-19 NOTE — PROGRESS NOTE ADULT - ATTENDING COMMENTS
Discharge home today.  Prednisone taper - decrease by 10 mg every 3 days.  Follow up with Dr. Young as outpatient.

## 2020-08-19 NOTE — PROGRESS NOTE ADULT - ASSESSMENT
64 YO Leandro speaking Female with PMHx of Diffuse Cystitic Bronchiectasis (Pseudomonas and Stenotrophmonas on previous cx) on intermittent on Prednisone, Primary Ciliary Dyskinesia, Allergic Bronchopulmonary Aspergillosis, HTN, DM2 A1C 8.7 (8/2020) p/w worsening productive cough, SOB, CP and intermittent fever over last 2 weeks. CXR unchanged with BL hazy opacities. Duonebs and Solumedrol given and admitted to Medicine. During admission, chest PT needed with NC trended up to 5L and transferred to [facility] on 8/11.     # Bronchiectasis Exacerbation 2/2 Pseudomonas PNA   - CXR with unchanged hazy opacities  - COVID negative and AB negative  - BCx NGTD. SCx (+) for Pseudomonas  - ContinuedCough Syrup Q6H PRN. Hycodan at bedtime PRN.   - DC Cipro. Started Zosyn and Jerry 8/11. Zosyn changed to LVQ and completed in house.   - Solumedrol 40mg IV QD changed to Prednisone 60mg PO QD and to be tapered off Q7Days.   - Duonebs (changed to Xopenex) Q4H. Symbicort BID.   - Fungal cultures sent and pending.   - Will need O2 at home.   - Chest PT Q6H and Suction PRN.     # HTN  - Restarted home Norvasc 5mg PO QD.   - Monitor BP and continue diet modifications for now.     # IDDM2 A1C 8.7 (8/2020)  - Continue on Lantus 14 U QHS and Lispro 8 U AC with moderate ISS ACHS while in house   - Monitor FS and adjust as needed while on steroids.     # Anxiety  - Home Zoloft dc'ed and Effexor started with improvement.     # DVT PPX with Lovenox  # DISPO - RCU. PT recc Home w/ Home PT. DC home today.

## 2020-08-25 LAB — A NIGER AB FLD QL: SIGNIFICANT CHANGE UP

## 2020-08-28 LAB — ACID FAST STN SPT: NORMAL

## 2020-09-01 ENCOUNTER — APPOINTMENT (OUTPATIENT)
Dept: ENDOCRINOLOGY | Facility: CLINIC | Age: 65
End: 2020-09-01

## 2020-09-08 NOTE — DISCUSSION/SUMMARY
[ED] : a call from ED [Communication with patient's family] : communication with patient's family [Med Rec Performed] : med rec performed [Home] : patient was discharged to home [Follow Up Call to Patient's Family] : follow up call to patient's family [Follow Up Appt with Provider within 7 days] : follow up appt with provider within 7 days [FreeTextEntry1] : Patient hospitalized for SOB, discharged to home , has f/u appt with pulmonologist. [FreeTextEntry3] : Spoke to DTR  Suki who is primary caregiver for patient.

## 2020-09-10 ENCOUNTER — APPOINTMENT (OUTPATIENT)
Dept: PULMONOLOGY | Facility: CLINIC | Age: 65
End: 2020-09-10

## 2020-09-10 LAB
CULTURE RESULTS: SIGNIFICANT CHANGE UP
SPECIMEN SOURCE: SIGNIFICANT CHANGE UP

## 2020-10-08 ENCOUNTER — APPOINTMENT (OUTPATIENT)
Dept: PULMONOLOGY | Facility: CLINIC | Age: 65
End: 2020-10-08
Payer: MEDICAID

## 2020-10-08 ENCOUNTER — MED ADMIN CHARGE (OUTPATIENT)
Age: 65
End: 2020-10-08

## 2020-10-08 VITALS
DIASTOLIC BLOOD PRESSURE: 67 MMHG | HEART RATE: 87 BPM | TEMPERATURE: 97.6 F | HEIGHT: 63 IN | RESPIRATION RATE: 16 BRPM | WEIGHT: 106 LBS | SYSTOLIC BLOOD PRESSURE: 114 MMHG | BODY MASS INDEX: 18.78 KG/M2

## 2020-10-08 DIAGNOSIS — Z23 ENCOUNTER FOR IMMUNIZATION: ICD-10-CM

## 2020-10-08 PROCEDURE — 99214 OFFICE O/P EST MOD 30 MIN: CPT | Mod: 25

## 2020-10-08 PROCEDURE — 90686 IIV4 VACC NO PRSV 0.5 ML IM: CPT

## 2020-10-08 PROCEDURE — G0008: CPT

## 2020-10-08 RX ORDER — ALUMINUM HYDROXIDE AND MAGNESIUM HYDROXIDE 200; 200 MG/5ML; MG/5ML
200-200 SUSPENSION ORAL
Qty: 1 | Refills: 2 | Status: ACTIVE | COMMUNITY
Start: 2020-10-08 | End: 1900-01-01

## 2020-10-08 RX ORDER — AMLODIPINE BESYLATE 5 MG/1
TABLET ORAL
Refills: 0 | Status: ACTIVE | COMMUNITY

## 2020-10-08 RX ORDER — TOBRAMYCIN 300 MG/4ML
SOLUTION RESPIRATORY (INHALATION)
Refills: 0 | Status: ACTIVE | COMMUNITY

## 2020-10-08 NOTE — ASSESSMENT
[FreeTextEntry1] : Ms. Sky is a 65 year old woman with probable PCD, characterized by sinusitis, bronchiectasis and recurrent infections.  Has grown hemophilus influenza in January 2020 which was intermediate to Cipro.  She is on a bronchodilator regimen and had not been able to tolerate inhaled tobramycin.Now growing Pseudomonas, resistant to cipro but sensitive to Tobramycin.  IMportance of airway clearance explained to her and daughter in law.  She is using Aerobika valve.  \par She has had two hospitalizations since this summer and is now using NELLY.  Also using airway clearance and willing to try hypertonic saline and vest that she has at home.  \par \par Plan:\par 1- continue current bronchodilator regimen: budesonide/pulmicort via nebulizer twice daily with albuterol as needed\par 2- clearance regimen:  twice daily albuterol, followed by hypersal and aerobika valve while wearing chest vest.\par 3- NELLY one month on, one off to start 10/15\par Azithromycin 500 TIW\par F/U in one month.

## 2020-10-08 NOTE — PHYSICAL EXAM
[No Acute Distress] : no acute distress [Normal Appearance] : normal appearance [No Neck Mass] : no neck mass [Normal Rate/Rhythm] : normal rate/rhythm [Normal S1, S2] : normal s1, s2 [No Murmurs] : no murmurs [No Resp Distress] : no resp distress [Wheeze] : wheeze [Rales] : rales [No Abnormalities] : no abnormalities [Normal Gait] : normal gait [No Clubbing] : no clubbing [No Cyanosis] : no cyanosis [No Edema] : no edema [FROM] : FROM [Normal Color/ Pigmentation] : normal color/ pigmentation [No Focal Deficits] : no focal deficits [Oriented x3] : oriented x3 [Normal Affect] : normal affect

## 2020-10-08 NOTE — HISTORY OF PRESENT ILLNESS
[TextBox_4] : Continues to cough and have sputum production- choking from the mucous.  Using the aerobika valve, asking about antibiotics.  \par Sputum grew mucin producing pseudomonas resistant to cipro, intermediate to levaquin, sensitive to meropenam and aminoglycosides.  \par AFB is negative thus far.  QT interval is fine on EKG  She had a low grade fever < 100, is not taking prednisone currently and using her nebulized ICS/LABA.  \par \par \par She was hospitalized at [facility] in mid august for about a week and treated for pseudomonas pneumonia.  She was discharged from [facility] with inhaled tobramycin which she had been using at home.  \par \par Admitted to [facility] from 9/4-9/15/20 with breathing difficulty.  treated with steroids antibiotics and discharged on inhaled clau.  Last used it on 9/15.  \par \par Using Aerobika valve 2-3 x per day.  perforomist and budesonide BID,

## 2020-10-12 ENCOUNTER — NON-APPOINTMENT (OUTPATIENT)
Age: 65
End: 2020-10-12

## 2020-11-10 ENCOUNTER — NON-APPOINTMENT (OUTPATIENT)
Age: 65
End: 2020-11-10

## 2020-11-12 ENCOUNTER — APPOINTMENT (OUTPATIENT)
Dept: PULMONOLOGY | Facility: CLINIC | Age: 65
End: 2020-11-12
Payer: MEDICAID

## 2020-11-12 DIAGNOSIS — K21.9 GASTRO-ESOPHAGEAL REFLUX DISEASE W/OUT ESOPHAGITIS: ICD-10-CM

## 2020-11-12 PROCEDURE — 99214 OFFICE O/P EST MOD 30 MIN: CPT | Mod: 95

## 2020-11-12 NOTE — ASSESSMENT
[FreeTextEntry1] : Ms. Sky is a 65 year old woman with probable PCD, characterized by sinusitis, bronchiectasis and recurrent infections.  Has grown hemophilus influenza in January 2020 which was intermediate to Cipro.  She is on a bronchodilator regimen and inhaled tobramycin.Now growing Pseudomonas, resistant to cipro but sensitive to Tobramycin.She is compliant with her bronchodilator regimen/ICS/ hypertonic saline, aerobika valve and currently off Tobramycin.  Has restarted prednisone for the last 2 weeks and remians on Azithromycin.  \par \par Plan:\par 1-Advised following order to her regimen: \par Albuterol nebulized followed by hypertonic saline and aerobika valve every morning.  1/2 to 1 hour later suggested  perforomist and budesonide via nebulizer.\par REpeat hypertonic saline/albuterol/aerobika in the evening\par perforomist/budesonide before bed\par 2-Azithromycin 500 TIW\par 3- to start tobramycin inhaled on 12/1\par Advised to decrease prednisone to 5 mg daily\par Meds renewed\par F/U in 3 months.

## 2020-11-12 NOTE — HISTORY OF PRESENT ILLNESS
[Home] : at home, [unfilled] , at the time of the visit. [Family Member] : family member [Verbal consent obtained from patient] : the patient, [unfilled] [TextBox_4] : 65 year old woman with probable PCD and recurrent respiratory infections.  Last admitted to [facility] in mid august with pneumonia. \par Sputum grew mucin producing pseudomonas resistant to cipro, intermediate to levaquin, sensitive to meropenam and aminoglycosides.  \par AFB is negative thus far.  QT interval is fine on EKG  She had a low grade fever < 100, is not taking prednisone currently and using her nebulized ICS/LABA.  \par \par Admitted to [facility] from 9/4-9/15/20 with breathing difficulty.  treated with steroids antibiotics and discharged on inhaled clau.  Last used it on 9/15.  \par \par Using Aerobika valve 2-3 x per day.  perforomist and budesonide BID, \par \par Follow up televisit today.  She is accompanied by her son.  She is feeling weak.  Getting vitamin B12 injections.  Oxygen saturation drops to 90% when she removes her oxygen.  Using hypertonic saline twice per day with albuterol and the Aerobika valve.  When she uses that she expectorates mucous and feels tired.  She is not using the inhaled tobramycin- feels it stops the mucous.  Took Tobramycin for month of October and now is off.  \par \par She is taking Prednisone as well 10mg daily.

## 2020-11-13 NOTE — PROGRESS NOTE ADULT - NEGATIVE GASTROINTESTINAL SYMPTOMS
[facility]
no nausea/no vomiting/no diarrhea
no diarrhea/no nausea/no vomiting
no nausea/c/o rectal discomfort after bowel movement/no change in bowel habits/no hematochezia/no diarrhea/no vomiting/no constipation

## 2020-12-21 ENCOUNTER — NON-APPOINTMENT (OUTPATIENT)
Age: 65
End: 2020-12-21

## 2020-12-23 ENCOUNTER — NON-APPOINTMENT (OUTPATIENT)
Age: 65
End: 2020-12-23

## 2020-12-24 ENCOUNTER — NON-APPOINTMENT (OUTPATIENT)
Age: 65
End: 2020-12-24

## 2021-03-17 ENCOUNTER — RX RENEWAL (OUTPATIENT)
Age: 66
End: 2021-03-17

## 2021-03-17 RX ORDER — SODIUM CHLORIDE FOR INHALATION 3 %
3 VIAL, NEBULIZER (ML) INHALATION
Qty: 720 | Refills: 2 | Status: ACTIVE | COMMUNITY
Start: 2018-07-03 | End: 1900-01-01

## 2021-03-29 DIAGNOSIS — R50.9 FEVER, UNSPECIFIED: ICD-10-CM

## 2021-03-30 ENCOUNTER — NON-APPOINTMENT (OUTPATIENT)
Age: 66
End: 2021-03-30

## 2021-03-31 ENCOUNTER — INPATIENT (INPATIENT)
Facility: HOSPITAL | Age: 66
LOS: 17 days | Discharge: HOME | End: 2021-04-18
Attending: HOSPITALIST | Admitting: HOSPITALIST
Payer: MEDICAID

## 2021-03-31 ENCOUNTER — APPOINTMENT (OUTPATIENT)
Dept: PULMONOLOGY | Facility: CLINIC | Age: 66
End: 2021-03-31
Payer: MEDICAID

## 2021-03-31 VITALS
TEMPERATURE: 98 F | HEART RATE: 84 BPM | HEIGHT: 65.5 IN | RESPIRATION RATE: 24 BRPM | SYSTOLIC BLOOD PRESSURE: 134 MMHG | OXYGEN SATURATION: 98 % | DIASTOLIC BLOOD PRESSURE: 61 MMHG

## 2021-03-31 DIAGNOSIS — Z90.49 ACQUIRED ABSENCE OF OTHER SPECIFIED PARTS OF DIGESTIVE TRACT: Chronic | ICD-10-CM

## 2021-03-31 DIAGNOSIS — R06.02 SHORTNESS OF BREATH: ICD-10-CM

## 2021-03-31 LAB
ALBUMIN SERPL ELPH-MCNC: 3.3 G/DL — SIGNIFICANT CHANGE UP (ref 3.3–5)
ALBUMIN SERPL ELPH-MCNC: 3.6 G/DL
ALP BLD-CCNC: 229 U/L
ALP SERPL-CCNC: 189 U/L — HIGH (ref 40–120)
ALT FLD-CCNC: 41 U/L — HIGH (ref 4–33)
ALT SERPL-CCNC: 43 U/L
ANION GAP SERPL CALC-SCNC: 11 MMOL/L
ANION GAP SERPL CALC-SCNC: 8 MMOL/L — SIGNIFICANT CHANGE UP (ref 7–14)
AST SERPL-CCNC: 27 U/L — SIGNIFICANT CHANGE UP (ref 4–32)
AST SERPL-CCNC: 31 U/L
BASOPHILS # BLD AUTO: 0.03 K/UL — SIGNIFICANT CHANGE UP (ref 0–0.2)
BASOPHILS # BLD AUTO: 0.05 K/UL
BASOPHILS NFR BLD AUTO: 0.2 % — SIGNIFICANT CHANGE UP (ref 0–2)
BASOPHILS NFR BLD AUTO: 0.3 %
BILIRUB SERPL-MCNC: 0.2 MG/DL
BILIRUB SERPL-MCNC: <0.2 MG/DL — SIGNIFICANT CHANGE UP (ref 0.2–1.2)
BLOOD GAS VENOUS COMPREHENSIVE RESULT: SIGNIFICANT CHANGE UP
BUN SERPL-MCNC: 15 MG/DL
BUN SERPL-MCNC: 22 MG/DL — SIGNIFICANT CHANGE UP (ref 7–23)
CALCIUM SERPL-MCNC: 9.8 MG/DL
CALCIUM SERPL-MCNC: 9.8 MG/DL — SIGNIFICANT CHANGE UP (ref 8.4–10.5)
CHLORIDE SERPL-SCNC: 93 MMOL/L
CHLORIDE SERPL-SCNC: 98 MMOL/L — SIGNIFICANT CHANGE UP (ref 98–107)
CO2 SERPL-SCNC: 30 MMOL/L — SIGNIFICANT CHANGE UP (ref 22–31)
CO2 SERPL-SCNC: 32 MMOL/L
CREAT SERPL-MCNC: 0.46 MG/DL
CREAT SERPL-MCNC: 0.51 MG/DL — SIGNIFICANT CHANGE UP (ref 0.5–1.3)
CRP SERPL-MCNC: 51 MG/L
DEPRECATED D DIMER PPP IA-ACNC: 194 NG/ML DDU
EOSINOPHIL # BLD AUTO: 0.05 K/UL — SIGNIFICANT CHANGE UP (ref 0–0.5)
EOSINOPHIL # BLD AUTO: 0.09 K/UL
EOSINOPHIL NFR BLD AUTO: 0.4 % — SIGNIFICANT CHANGE UP (ref 0–6)
EOSINOPHIL NFR BLD AUTO: 0.6 %
FERRITIN SERPL-MCNC: 245 NG/ML
GLUCOSE BLDC GLUCOMTR-MCNC: 232 MG/DL — HIGH (ref 70–99)
GLUCOSE SERPL-MCNC: 286 MG/DL — HIGH (ref 70–99)
GLUCOSE SERPL-MCNC: 303 MG/DL
HCT VFR BLD CALC: 37.7 % — SIGNIFICANT CHANGE UP (ref 34.5–45)
HCT VFR BLD CALC: 41.8 %
HGB BLD-MCNC: 11.7 G/DL — SIGNIFICANT CHANGE UP (ref 11.5–15.5)
HGB BLD-MCNC: 12.7 G/DL
IANC: 9.73 K/UL — HIGH (ref 1.5–8.5)
IMM GRANULOCYTES NFR BLD AUTO: 0.6 %
IMM GRANULOCYTES NFR BLD AUTO: 0.6 % — SIGNIFICANT CHANGE UP (ref 0–1.5)
LYMPHOCYTES # BLD AUTO: 1.63 K/UL — SIGNIFICANT CHANGE UP (ref 1–3.3)
LYMPHOCYTES # BLD AUTO: 13.1 % — SIGNIFICANT CHANGE UP (ref 13–44)
LYMPHOCYTES # BLD AUTO: 2.21 K/UL
LYMPHOCYTES NFR BLD AUTO: 14.2 %
MAN DIFF?: NORMAL
MCHC RBC-ENTMCNC: 27 PG — SIGNIFICANT CHANGE UP (ref 27–34)
MCHC RBC-ENTMCNC: 27.7 PG
MCHC RBC-ENTMCNC: 30.4 GM/DL
MCHC RBC-ENTMCNC: 31 GM/DL — LOW (ref 32–36)
MCV RBC AUTO: 87.1 FL — SIGNIFICANT CHANGE UP (ref 80–100)
MCV RBC AUTO: 91.1 FL
MONOCYTES # BLD AUTO: 0.95 K/UL — HIGH (ref 0–0.9)
MONOCYTES # BLD AUTO: 1.32 K/UL
MONOCYTES NFR BLD AUTO: 7.6 % — SIGNIFICANT CHANGE UP (ref 2–14)
MONOCYTES NFR BLD AUTO: 8.5 %
NEUTROPHILS # BLD AUTO: 11.78 K/UL
NEUTROPHILS # BLD AUTO: 9.73 K/UL — HIGH (ref 1.8–7.4)
NEUTROPHILS NFR BLD AUTO: 75.8 %
NEUTROPHILS NFR BLD AUTO: 78.1 % — HIGH (ref 43–77)
NRBC # BLD: 0 /100 WBCS — SIGNIFICANT CHANGE UP
NRBC # FLD: 0 K/UL — SIGNIFICANT CHANGE UP
PLATELET # BLD AUTO: 213 K/UL — SIGNIFICANT CHANGE UP (ref 150–400)
PLATELET # BLD AUTO: 257 K/UL
POTASSIUM SERPL-MCNC: 4.9 MMOL/L — SIGNIFICANT CHANGE UP (ref 3.5–5.3)
POTASSIUM SERPL-SCNC: 4.9 MMOL/L — SIGNIFICANT CHANGE UP (ref 3.5–5.3)
POTASSIUM SERPL-SCNC: 5 MMOL/L
PROCALCITONIN SERPL-MCNC: 0.05 NG/ML
PROT SERPL-MCNC: 6.9 G/DL — SIGNIFICANT CHANGE UP (ref 6–8.3)
PROT SERPL-MCNC: 7 G/DL
RBC # BLD: 4.33 M/UL — SIGNIFICANT CHANGE UP (ref 3.8–5.2)
RBC # BLD: 4.59 M/UL
RBC # FLD: 12.9 % — SIGNIFICANT CHANGE UP (ref 10.3–14.5)
RBC # FLD: 13.4 %
SARS-COV-2 N GENE NPH QL NAA+PROBE: NOT DETECTED
SODIUM SERPL-SCNC: 136 MMOL/L
SODIUM SERPL-SCNC: 136 MMOL/L — SIGNIFICANT CHANGE UP (ref 135–145)
WBC # BLD: 12.47 K/UL — HIGH (ref 3.8–10.5)
WBC # FLD AUTO: 12.47 K/UL — HIGH (ref 3.8–10.5)
WBC # FLD AUTO: 15.54 K/UL

## 2021-03-31 PROCEDURE — 99214 OFFICE O/P EST MOD 30 MIN: CPT | Mod: 95

## 2021-03-31 PROCEDURE — 71045 X-RAY EXAM CHEST 1 VIEW: CPT | Mod: 26

## 2021-03-31 PROCEDURE — 99285 EMERGENCY DEPT VISIT HI MDM: CPT

## 2021-03-31 PROCEDURE — 71250 CT THORAX DX C-: CPT | Mod: 26

## 2021-03-31 RX ORDER — PIPERACILLIN AND TAZOBACTAM 4; .5 G/20ML; G/20ML
3.38 INJECTION, POWDER, LYOPHILIZED, FOR SOLUTION INTRAVENOUS ONCE
Refills: 0 | Status: COMPLETED | OUTPATIENT
Start: 2021-04-01 | End: 2021-04-01

## 2021-03-31 RX ORDER — PIPERACILLIN AND TAZOBACTAM 4; .5 G/20ML; G/20ML
3.38 INJECTION, POWDER, LYOPHILIZED, FOR SOLUTION INTRAVENOUS ONCE
Refills: 0 | Status: COMPLETED | OUTPATIENT
Start: 2021-03-31 | End: 2021-03-31

## 2021-03-31 RX ADMIN — PIPERACILLIN AND TAZOBACTAM 200 GRAM(S): 4; .5 INJECTION, POWDER, LYOPHILIZED, FOR SOLUTION INTRAVENOUS at 19:31

## 2021-03-31 NOTE — ED PROVIDER NOTE - ATTENDING CONTRIBUTION TO CARE
No resp distress, non toxic appearing, concern for pseudomonal lung infection, will give zosyn, pending chest CT, admit

## 2021-03-31 NOTE — ED PROVIDER NOTE - PHYSICAL EXAMINATION
Gen: A&Ox4.   HEENT: Atraumatic. No pharyngeal erythema or exudates. Mucous membranes moist, no scleral icterus.   CV: RRR. No M/R/G. No significant LE edema. Distal pulses intact. Capillary refill < 2 seconds.  Resp: Normal effort. Crackles bilat, diminished bilat, no rhonchi, or wheezes.  GI: Abdomen non tender to palpation, soft and non-distended. No masses appreciated. + BS.  MSK: No open wounds. No ecchymosis appreciated.  Neuro: Following commands. Moving extremities spontaneously.  Psych: Appropriate mood, cooperative

## 2021-03-31 NOTE — ASSESSMENT
[FreeTextEntry1] : Ms. Sky is a 66 year old woman with probable PCD, characterized by sinusitis, bronchiectasis and recurrent infections.  Has grown hemophilus influenza in January 2020 which was intermediate to Cipro.  She is on a bronchodilator regimen and inhaled tobramycin.Most recently grew Pseudomonas, resistant to cipro but sensitive to Tobramycin.She is compliant with her bronchodilator regimen/ICS/ hypertonic saline, aerobika valve and currently off Tobramycin.\par Acute televisit today - she has had increased cough and sputum production for the last 10 days, started prednisone 20BID since that time and now with fever.  I am concerned about pneumonia, bronchiectasis exacerbation and pseudomonas has been resistant to cipro/levaquin in the past.  Labs yesterday notable for elevated WBC, CRP, COVID pcr negative.  \par Plan:\elsie Advised her to go to the ED for IV antibiotics and evaluation.\elsie Spoke with ED attending and recommended pan culture, chest CT, initiation of IV antipseudomonal antibiotics.\elsie Family understands and agrees.

## 2021-03-31 NOTE — ED PROVIDER NOTE - NS ED ROS FT
Gen: +fever.   HEENT: Denies headache. Denies congestion.  CV: +chest pain. Denies lightheadedness.  Skin: Denies rash.   Resp: +SOB. +cough.  GI: Denies abd pain. Denies nausea. Denies vomiting. Denies diarrhea. Denies melena. Denies hematochezia.  Msk: Denies extremity swelling. Denies extremity pain.  : Denies dysuria. Denies hematuria.  Neuro: Denies LOC. Denies dizziness. Denies new numbness/tingling. Denies new focal weakness.  Psych: Denies SI

## 2021-03-31 NOTE — ED ADULT TRIAGE NOTE - CHIEF COMPLAINT QUOTE
pt sent in by pulmonologist for fever tmax 102, worsening SOB x1 week. PMH lung disease on 2L NC. O2 sat 98% with home O2. Denies N/V, abdominal pain, sick contacts. Daughter in law Suki can be reached at [phone]

## 2021-03-31 NOTE — ED ADULT NURSE NOTE - OBJECTIVE STATEMENT
Pt arriving to room 6 A&OX4 ambulatory c/o SOB, cough. PMHx pulmonary dx, HTN. Pt endorsing cough with left-sided CP worse with coughing. Pt arriving to ED on 1L home O2. O2 sat 98% on 1L. RR even and unlabored. NSR on monitor. Pt denies N/V, HA, abdominal pain. IV established with 20G in right wrist. Labs drawn and sent. Awaiting CXR. Will continue to monitor.

## 2021-03-31 NOTE — ED PROVIDER NOTE - OBJECTIVE STATEMENT
67 yo F hx of diffuse cystitic bronchiectasis, primary ciliary dyskinesia, allergic bronchopulmonary aspergillosis, pseudomonas pneumonia, HTN, DM2, presently on 40 mg prednisone daily, presenting as directed by her pulmonologist for increasing SOB x 2 weeks and fevers (max 102) x 2 days. + generalized weakness and sharp CP w/ coughing. COVID negative yesterday on outpt testing, although CRP elevated to 60, and concern for recurrence of pseudomonas pneumonia. Denies CP w/ exertion. Denies N/V/D and abd pain. 65 yo F hx of diffuse cystitic bronchiectasis, primary ciliary dyskinesia, allergic bronchopulmonary aspergillosis, pseudomonas pneumonia, HTN, DM2, presently on 20 mg prednisone daily x 10 days, presenting as directed by her pulmonologist for increasing SOB x 2 weeks and fevers (max 102) x 2 days. + generalized weakness and sharp CP w/ coughing. COVID negative yesterday on outpt testing, although CRP elevated to 60, and concern for recurrence of pseudomonas pneumonia. Denies CP w/ exertion. Denies N/V/D and abd pain.

## 2021-03-31 NOTE — REASON FOR VISIT
[Home] : at home, [unfilled] , at the time of the visit. [Medical Office: ([facility])___] : at the medical office located in  [Verbal consent obtained from patient] : the patient, [unfilled] [Initial] : an initial visit

## 2021-03-31 NOTE — ED ADULT NURSE NOTE - CHIEF COMPLAINT QUOTE
pt sent in by pulmonologist for fever tmax 102, worsening SOB x1 week. PMH lung disease on 2L NC. O2 sat 98% with home O2. Denies N/V, abdominal pain, sick contacts. Daughter in law Suki can be reached at [phone]

## 2021-03-31 NOTE — HISTORY OF PRESENT ILLNESS
[TextBox_4] : Mrs. Sky has not been feeling well.  Daughter in law is translating today.  She reports not feeling well for a few weeks with increased cough and sputum production.  She is using budesonide/perforomist twice daily via nebulizer, albuterol an addition al 2-3x per day.  Is on inhaled tobramycin which she does not feel helps her and she has not used it in 2 months.  she is using her airway clearance valve.  \par She self medicated with Prednisone 20mg BID for the last 10 days.  \par \par A few days ago she developed fever to 101-102 at home.  \par \par She has not been COVID tested, but does not leave the home and eveyrone at home is well according to the daughter.\par \par NP from our office sent [facility] home draw to her home yesterday and tested her for COVId, PCR is still pending.  Remaining laba show elevated WBC, CRP, mildly elevated ferritin at 245.  Also wiwth markedly elevated glucose.  \par \par Family reports hypoxia at home as well, She uses oxygen 24/7 at [facility].   No

## 2021-04-01 DIAGNOSIS — E11.65 TYPE 2 DIABETES MELLITUS WITH HYPERGLYCEMIA: ICD-10-CM

## 2021-04-01 DIAGNOSIS — I10 ESSENTIAL (PRIMARY) HYPERTENSION: ICD-10-CM

## 2021-04-01 DIAGNOSIS — B44.81 ALLERGIC BRONCHOPULMONARY ASPERGILLOSIS: ICD-10-CM

## 2021-04-01 DIAGNOSIS — K21.9 GASTRO-ESOPHAGEAL REFLUX DISEASE WITHOUT ESOPHAGITIS: ICD-10-CM

## 2021-04-01 DIAGNOSIS — J18.9 PNEUMONIA, UNSPECIFIED ORGANISM: ICD-10-CM

## 2021-04-01 DIAGNOSIS — Z29.9 ENCOUNTER FOR PROPHYLACTIC MEASURES, UNSPECIFIED: ICD-10-CM

## 2021-04-01 LAB
A1C WITH ESTIMATED AVERAGE GLUCOSE RESULT: 8.2 % — HIGH (ref 4–5.6)
APPEARANCE UR: ABNORMAL
BILIRUB UR-MCNC: NEGATIVE — SIGNIFICANT CHANGE UP
COLOR SPEC: SIGNIFICANT CHANGE UP
DIFF PNL FLD: ABNORMAL
ESTIMATED AVERAGE GLUCOSE: 189 MG/DL — HIGH (ref 68–114)
GLUCOSE BLDC GLUCOMTR-MCNC: 175 MG/DL — HIGH (ref 70–99)
GLUCOSE BLDC GLUCOMTR-MCNC: 196 MG/DL — HIGH (ref 70–99)
GLUCOSE BLDC GLUCOMTR-MCNC: 243 MG/DL — HIGH (ref 70–99)
GLUCOSE BLDC GLUCOMTR-MCNC: 301 MG/DL — HIGH (ref 70–99)
GLUCOSE BLDC GLUCOMTR-MCNC: 355 MG/DL — HIGH (ref 70–99)
GLUCOSE UR QL: NEGATIVE — SIGNIFICANT CHANGE UP
KETONES UR-MCNC: NEGATIVE — SIGNIFICANT CHANGE UP
LEUKOCYTE ESTERASE UR-ACNC: ABNORMAL
NITRITE UR-MCNC: POSITIVE
PH UR: 8 — SIGNIFICANT CHANGE UP (ref 5–8)
PROT UR-MCNC: ABNORMAL
SARS-COV-2 RNA SPEC QL NAA+PROBE: SIGNIFICANT CHANGE UP
SP GR SPEC: 1.01 — SIGNIFICANT CHANGE UP (ref 1.01–1.02)
UROBILINOGEN FLD QL: SIGNIFICANT CHANGE UP

## 2021-04-01 PROCEDURE — 99223 1ST HOSP IP/OBS HIGH 75: CPT | Mod: GC

## 2021-04-01 PROCEDURE — 99255 IP/OBS CONSLTJ NEW/EST HI 80: CPT

## 2021-04-01 PROCEDURE — 12345: CPT | Mod: NC

## 2021-04-01 RX ORDER — IPRATROPIUM/ALBUTEROL SULFATE 18-103MCG
3 AEROSOL WITH ADAPTER (GRAM) INHALATION EVERY 6 HOURS
Refills: 0 | Status: DISCONTINUED | OUTPATIENT
Start: 2021-04-01 | End: 2021-04-03

## 2021-04-01 RX ORDER — PIPERACILLIN AND TAZOBACTAM 4; .5 G/20ML; G/20ML
3.38 INJECTION, POWDER, LYOPHILIZED, FOR SOLUTION INTRAVENOUS EVERY 8 HOURS
Refills: 0 | Status: COMPLETED | OUTPATIENT
Start: 2021-04-01 | End: 2021-04-08

## 2021-04-01 RX ORDER — VENLAFAXINE HCL 75 MG
37.5 CAPSULE, EXT RELEASE 24 HR ORAL DAILY
Refills: 0 | Status: DISCONTINUED | OUTPATIENT
Start: 2021-04-01 | End: 2021-04-18

## 2021-04-01 RX ORDER — INSULIN GLARGINE 100 [IU]/ML
25 INJECTION, SOLUTION SUBCUTANEOUS
Qty: 0 | Refills: 0 | DISCHARGE

## 2021-04-01 RX ORDER — INFLUENZA VIRUS VACCINE 15; 15; 15; 15 UG/.5ML; UG/.5ML; UG/.5ML; UG/.5ML
0.5 SUSPENSION INTRAMUSCULAR ONCE
Refills: 0 | Status: DISCONTINUED | OUTPATIENT
Start: 2021-04-01 | End: 2021-04-18

## 2021-04-01 RX ORDER — INSULIN GLARGINE 100 [IU]/ML
8 INJECTION, SOLUTION SUBCUTANEOUS ONCE
Refills: 0 | Status: COMPLETED | OUTPATIENT
Start: 2021-04-01 | End: 2021-04-01

## 2021-04-01 RX ORDER — SODIUM CHLORIDE 9 MG/ML
1000 INJECTION, SOLUTION INTRAVENOUS
Refills: 0 | Status: DISCONTINUED | OUTPATIENT
Start: 2021-04-01 | End: 2021-04-18

## 2021-04-01 RX ORDER — ACETAMINOPHEN 500 MG
650 TABLET ORAL EVERY 6 HOURS
Refills: 0 | Status: DISCONTINUED | OUTPATIENT
Start: 2021-04-01 | End: 2021-04-18

## 2021-04-01 RX ORDER — BUDESONIDE, MICRONIZED 100 %
0.5 POWDER (GRAM) MISCELLANEOUS EVERY 12 HOURS
Refills: 0 | Status: DISCONTINUED | OUTPATIENT
Start: 2021-04-01 | End: 2021-04-06

## 2021-04-01 RX ORDER — AMLODIPINE BESYLATE 2.5 MG/1
1 TABLET ORAL
Qty: 0 | Refills: 0 | DISCHARGE

## 2021-04-01 RX ORDER — MONTELUKAST 4 MG/1
10 TABLET, CHEWABLE ORAL DAILY
Refills: 0 | Status: DISCONTINUED | OUTPATIENT
Start: 2021-04-01 | End: 2021-04-18

## 2021-04-01 RX ORDER — ENOXAPARIN SODIUM 100 MG/ML
40 INJECTION SUBCUTANEOUS DAILY
Refills: 0 | Status: DISCONTINUED | OUTPATIENT
Start: 2021-04-01 | End: 2021-04-18

## 2021-04-01 RX ORDER — TOBRAMYCIN SULFATE 40 MG/ML
300 VIAL (ML) INJECTION EVERY 12 HOURS
Refills: 0 | Status: DISCONTINUED | OUTPATIENT
Start: 2021-04-01 | End: 2021-04-18

## 2021-04-01 RX ORDER — DOCUSATE SODIUM 100 MG
0 CAPSULE ORAL
Qty: 0 | Refills: 0 | DISCHARGE

## 2021-04-01 RX ORDER — FERROUS SULFATE 325(65) MG
325 TABLET ORAL DAILY
Refills: 0 | Status: DISCONTINUED | OUTPATIENT
Start: 2021-04-01 | End: 2021-04-18

## 2021-04-01 RX ORDER — TIOTROPIUM BROMIDE 18 UG/1
1 CAPSULE ORAL; RESPIRATORY (INHALATION) DAILY
Refills: 0 | Status: DISCONTINUED | OUTPATIENT
Start: 2021-04-01 | End: 2021-04-18

## 2021-04-01 RX ORDER — INSULIN LISPRO 100/ML
2 VIAL (ML) SUBCUTANEOUS
Refills: 0 | Status: DISCONTINUED | OUTPATIENT
Start: 2021-04-01 | End: 2021-04-03

## 2021-04-01 RX ORDER — DEXTROSE 50 % IN WATER 50 %
12.5 SYRINGE (ML) INTRAVENOUS ONCE
Refills: 0 | Status: DISCONTINUED | OUTPATIENT
Start: 2021-04-01 | End: 2021-04-18

## 2021-04-01 RX ORDER — DEXTROSE 50 % IN WATER 50 %
25 SYRINGE (ML) INTRAVENOUS ONCE
Refills: 0 | Status: DISCONTINUED | OUTPATIENT
Start: 2021-04-01 | End: 2021-04-18

## 2021-04-01 RX ORDER — SENNA PLUS 8.6 MG/1
2 TABLET ORAL AT BEDTIME
Refills: 0 | Status: DISCONTINUED | OUTPATIENT
Start: 2021-04-01 | End: 2021-04-18

## 2021-04-01 RX ORDER — SODIUM,POTASSIUM PHOSPHATES 278-250MG
1 POWDER IN PACKET (EA) ORAL
Refills: 0 | Status: COMPLETED | OUTPATIENT
Start: 2021-04-01 | End: 2021-04-02

## 2021-04-01 RX ORDER — BUDESONIDE, MICRONIZED 100 %
2 POWDER (GRAM) MISCELLANEOUS
Qty: 0 | Refills: 0 | DISCHARGE

## 2021-04-01 RX ORDER — INSULIN GLARGINE 100 [IU]/ML
10 INJECTION, SOLUTION SUBCUTANEOUS AT BEDTIME
Refills: 0 | Status: DISCONTINUED | OUTPATIENT
Start: 2021-04-01 | End: 2021-04-01

## 2021-04-01 RX ORDER — PANTOPRAZOLE SODIUM 20 MG/1
40 TABLET, DELAYED RELEASE ORAL
Refills: 0 | Status: DISCONTINUED | OUTPATIENT
Start: 2021-04-01 | End: 2021-04-18

## 2021-04-01 RX ORDER — PREGABALIN 225 MG/1
1000 CAPSULE ORAL
Refills: 0 | Status: DISCONTINUED | OUTPATIENT
Start: 2021-04-01 | End: 2021-04-18

## 2021-04-01 RX ORDER — DEXTROSE 50 % IN WATER 50 %
15 SYRINGE (ML) INTRAVENOUS ONCE
Refills: 0 | Status: DISCONTINUED | OUTPATIENT
Start: 2021-04-01 | End: 2021-04-18

## 2021-04-01 RX ORDER — GLIMEPIRIDE 1 MG
1 TABLET ORAL
Qty: 0 | Refills: 0 | DISCHARGE

## 2021-04-01 RX ORDER — GLUCAGON INJECTION, SOLUTION 0.5 MG/.1ML
1 INJECTION, SOLUTION SUBCUTANEOUS ONCE
Refills: 0 | Status: DISCONTINUED | OUTPATIENT
Start: 2021-04-01 | End: 2021-04-18

## 2021-04-01 RX ORDER — INSULIN GLARGINE 100 [IU]/ML
15 INJECTION, SOLUTION SUBCUTANEOUS
Qty: 0 | Refills: 0 | DISCHARGE

## 2021-04-01 RX ORDER — NYSTATIN 500MM UNIT
500000 POWDER (EA) MISCELLANEOUS
Refills: 0 | Status: DISCONTINUED | OUTPATIENT
Start: 2021-04-01 | End: 2021-04-12

## 2021-04-01 RX ORDER — INSULIN GLARGINE 100 [IU]/ML
20 INJECTION, SOLUTION SUBCUTANEOUS AT BEDTIME
Refills: 0 | Status: DISCONTINUED | OUTPATIENT
Start: 2021-04-01 | End: 2021-04-03

## 2021-04-01 RX ORDER — ALBUTEROL 90 UG/1
3 AEROSOL, METERED ORAL
Qty: 0 | Refills: 0 | DISCHARGE

## 2021-04-01 RX ORDER — INSULIN LISPRO 100/ML
VIAL (ML) SUBCUTANEOUS
Refills: 0 | Status: DISCONTINUED | OUTPATIENT
Start: 2021-04-01 | End: 2021-04-18

## 2021-04-01 RX ORDER — FAMOTIDINE 10 MG/ML
1 INJECTION INTRAVENOUS
Qty: 0 | Refills: 0 | DISCHARGE

## 2021-04-01 RX ORDER — FLUTICASONE PROPIONATE 220 MCG
1 AEROSOL WITH ADAPTER (GRAM) INHALATION
Qty: 0 | Refills: 0 | DISCHARGE

## 2021-04-01 RX ORDER — POLYETHYLENE GLYCOL 3350 17 G/17G
17 POWDER, FOR SOLUTION ORAL DAILY
Refills: 0 | Status: DISCONTINUED | OUTPATIENT
Start: 2021-04-01 | End: 2021-04-18

## 2021-04-01 RX ORDER — INSULIN LISPRO 100/ML
VIAL (ML) SUBCUTANEOUS AT BEDTIME
Refills: 0 | Status: DISCONTINUED | OUTPATIENT
Start: 2021-04-01 | End: 2021-04-18

## 2021-04-01 RX ORDER — INSULIN GLARGINE 100 [IU]/ML
8 INJECTION, SOLUTION SUBCUTANEOUS AT BEDTIME
Refills: 0 | Status: DISCONTINUED | OUTPATIENT
Start: 2021-04-01 | End: 2021-04-01

## 2021-04-01 RX ORDER — TOBRAMYCIN SULFATE 40 MG/ML
4 VIAL (ML) INJECTION
Qty: 0 | Refills: 0 | DISCHARGE

## 2021-04-01 RX ORDER — ALBUTEROL 90 UG/1
2 AEROSOL, METERED ORAL
Qty: 0 | Refills: 0 | DISCHARGE

## 2021-04-01 RX ORDER — ALBUTEROL 90 UG/1
1 AEROSOL, METERED ORAL EVERY 6 HOURS
Refills: 0 | Status: DISCONTINUED | OUTPATIENT
Start: 2021-04-01 | End: 2021-04-12

## 2021-04-01 RX ORDER — FLUTICASONE PROPIONATE 50 MCG
1 SPRAY, SUSPENSION NASAL
Refills: 0 | Status: DISCONTINUED | OUTPATIENT
Start: 2021-04-01 | End: 2021-04-18

## 2021-04-01 RX ORDER — INSULIN LISPRO 100/ML
5 VIAL (ML) SUBCUTANEOUS
Qty: 0 | Refills: 0 | DISCHARGE

## 2021-04-01 RX ORDER — AZTREONAM 2 G
1 VIAL (EA) INJECTION
Qty: 0 | Refills: 0 | DISCHARGE

## 2021-04-01 RX ORDER — SODIUM CHLORIDE 9 MG/ML
4 INJECTION INTRAMUSCULAR; INTRAVENOUS; SUBCUTANEOUS
Refills: 0 | Status: DISCONTINUED | OUTPATIENT
Start: 2021-04-01 | End: 2021-04-01

## 2021-04-01 RX ORDER — ALBUTEROL 90 UG/1
2.5 AEROSOL, METERED ORAL EVERY 6 HOURS
Refills: 0 | Status: DISCONTINUED | OUTPATIENT
Start: 2021-04-01 | End: 2021-04-01

## 2021-04-01 RX ADMIN — INSULIN GLARGINE 20 UNIT(S): 100 INJECTION, SOLUTION SUBCUTANEOUS at 22:43

## 2021-04-01 RX ADMIN — INSULIN GLARGINE 8 UNIT(S): 100 INJECTION, SOLUTION SUBCUTANEOUS at 02:35

## 2021-04-01 RX ADMIN — Medication 37.5 MILLIGRAM(S): at 12:53

## 2021-04-01 RX ADMIN — Medication 1 TABLET(S): at 12:02

## 2021-04-01 RX ADMIN — Medication 300 MILLIGRAM(S): at 23:08

## 2021-04-01 RX ADMIN — PIPERACILLIN AND TAZOBACTAM 25 GRAM(S): 4; .5 INJECTION, POWDER, LYOPHILIZED, FOR SOLUTION INTRAVENOUS at 17:59

## 2021-04-01 RX ADMIN — Medication 20 MILLIGRAM(S): at 21:32

## 2021-04-01 RX ADMIN — Medication 100 MILLIGRAM(S): at 21:32

## 2021-04-01 RX ADMIN — Medication 500000 UNIT(S): at 21:32

## 2021-04-01 RX ADMIN — Medication 2 UNIT(S): at 17:59

## 2021-04-01 RX ADMIN — Medication 3 MILLILITER(S): at 23:03

## 2021-04-01 RX ADMIN — Medication 5: at 17:58

## 2021-04-01 RX ADMIN — SENNA PLUS 2 TABLET(S): 8.6 TABLET ORAL at 21:32

## 2021-04-01 RX ADMIN — Medication 0.5 MILLIGRAM(S): at 10:23

## 2021-04-01 RX ADMIN — PIPERACILLIN AND TAZOBACTAM 25 GRAM(S): 4; .5 INJECTION, POWDER, LYOPHILIZED, FOR SOLUTION INTRAVENOUS at 10:29

## 2021-04-01 RX ADMIN — Medication 0.5 MILLIGRAM(S): at 23:15

## 2021-04-01 RX ADMIN — PIPERACILLIN AND TAZOBACTAM 25 GRAM(S): 4; .5 INJECTION, POWDER, LYOPHILIZED, FOR SOLUTION INTRAVENOUS at 01:50

## 2021-04-01 RX ADMIN — Medication 4: at 12:28

## 2021-04-01 RX ADMIN — Medication 1 TABLET(S): at 10:29

## 2021-04-01 RX ADMIN — Medication 20 MILLIGRAM(S): at 05:47

## 2021-04-01 RX ADMIN — SODIUM CHLORIDE 4 MILLILITER(S): 9 INJECTION INTRAMUSCULAR; INTRAVENOUS; SUBCUTANEOUS at 11:00

## 2021-04-01 RX ADMIN — MONTELUKAST 10 MILLIGRAM(S): 4 TABLET, CHEWABLE ORAL at 12:02

## 2021-04-01 RX ADMIN — Medication 1 TABLET(S): at 17:59

## 2021-04-01 RX ADMIN — PANTOPRAZOLE SODIUM 40 MILLIGRAM(S): 20 TABLET, DELAYED RELEASE ORAL at 05:47

## 2021-04-01 RX ADMIN — ENOXAPARIN SODIUM 40 MILLIGRAM(S): 100 INJECTION SUBCUTANEOUS at 12:02

## 2021-04-01 NOTE — H&P ADULT - NSHPPHYSICALEXAM_GEN_ALL_CORE
Vital Signs Last 24 Hrs  T(C): 36.6 (01 Apr 2021 00:49), Max: 36.7 (31 Mar 2021 17:42)  T(F): 97.8 (01 Apr 2021 00:49), Max: 98 (31 Mar 2021 17:42)  HR: 74 (01 Apr 2021 00:49) (74 - 98)  BP: 128/71 (01 Apr 2021 00:49) (128/71 - 145/77)  BP(mean): --  RR: 23 (01 Apr 2021 00:49) (20 - 24)  SpO2: 100% (01 Apr 2021 00:49) (98% - 100%)    PHYSICAL EXAM:  GENERAL: No acute distress. On supplemental O2 via NC at 4LPM  HEENT: Dry oral mucous membranes  NECK: Supple, no lymphadenopathy, no JVD  CHEST/LUNG: Diffuse crackles  HEART: Regular rate and rhythm. Normal S1/S2. No murmurs, rubs, or gallops  ABDOMEN: Soft, non-tender, non-distended; normal bowel sounds, no organomegaly  EXTREMITIES:  2+ peripheral pulses b/l, No clubbing, cyanosis, or edema  NEUROLOGY: A&O x 3, no focal deficits  SKIN: No rashes or lesions Vital Signs Last 24 Hrs  T(C): 36.6 (01 Apr 2021 00:49), Max: 36.7 (31 Mar 2021 17:42)  T(F): 97.8 (01 Apr 2021 00:49), Max: 98 (31 Mar 2021 17:42)  HR: 74 (01 Apr 2021 00:49) (74 - 98)  BP: 128/71 (01 Apr 2021 00:49) (128/71 - 145/77)  BP(mean): --  RR: 23 (01 Apr 2021 00:49) (20 - 24)  SpO2: 100% (01 Apr 2021 00:49) (98% - 100%)    PHYSICAL EXAM:  GENERAL: No acute distress. On supplemental O2 via NC at 4LPM  HEENT: Dry oral mucous membranes  NECK: Supple, no lymphadenopathy, no JVD  CHEST/LUNG: Diffuse crackles, no accessary muscle use  HEART: Regular rate and rhythm. Normal S1/S2. No murmurs, rubs, or gallops  ABDOMEN: Soft, non-tender, non-distended; normal bowel sounds, no organomegaly  EXTREMITIES:  2+ peripheral pulses b/l, No clubbing, cyanosis, or edema  NEUROLOGY: A&O x 3, no focal deficits  SKIN: No rashes or lesions

## 2021-04-01 NOTE — H&P ADULT - NSHPSOCIALHISTORY_GEN_ALL_CORE
Never smoked tobacco. No EtOH or illicit drug use. Never smoked tobacco. No EtOH or illicit drug use. Lives with family.

## 2021-04-01 NOTE — CONSULT NOTE ADULT - ASSESSMENT
65 yo F PMH cystic bronchiectasis, PCD, ABPA, prior pseudomonas , stenotrophomonas pna, HTN, and T2DM presenting for subacute worsening shortness of breath and fevers c/f pna    - CT w/ new KELLY GGOs  - agree w/ empiric coverage w/ zosyn; please obtain sputum cultures  - f/u BCx  - okay to continue BID prednisone 20mg  - would transition to q6 standing albuterol w/ additional q6 prn  - c/w inhaled tobramycin  - would avoid hypersal as caused pt discomfort in the past, please provde with aerobika for assistance with clearance; also recommend CT as tolerated    David Aldrich MD  PGY-4  PCCM Fellow  Pager [phone]

## 2021-04-01 NOTE — H&P ADULT - ATTENDING COMMENTS
Patient seen and examined on 4/1/2021, case discussed with Dr. Loco Granda. Patient speaks Leandro and Parker, provider speaks Parker. This is a 66F with history as above who presents to the hospital with complaints of a worsening cough and SOB for the past few weeks and fevers for the past few days. Patient reports a history of a chronic cough and reliance on supplemental O2 but over the past few weeks said that her breathing and cough both got substantially worse. Reports increased sputum production (mostly yellow) and fevers (reports temps to 99s at home). Also with dysuria and decreased PO intake. No other complaints.   - Imaging here concerning for new areas of PNA, given her history of Pseudomonas concern for pseudomonas PNA, would therefore place patient on zosyn, c/w above inhaler therapy management, pulm marjorie in AM  - Other management as above. Patient seen and examined on 4/1/2021, case discussed with Dr. Loco Granda. Patient speaks Leandro and Parker, provider speaks Parker. This is a 66F with history as above who presents to the hospital with complaints of a worsening cough and SOB for the past few weeks and fevers for the past few days. Patient reports a history of a chronic cough and reliance on supplemental O2 but over the past few weeks said that her breathing and cough both got substantially worse. Reports increased sputum production (mostly yellow, did have some hemoptysis a few days prior to admission) and fevers (reports temps to 99s at home). Also with dysuria and decreased PO intake. No other complaints.   - Imaging here concerning for new areas of PNA, given her history of Pseudomonas concern for pseudomonas PNA, would therefore place patient on zosyn, c/w above inhaler therapy management, pulm eval in AM, suspect her mild hemoptysis a few days prior to admission is related to exacerbation of her acute disease, CT Chest with PNA.chronic stable findings, no other acute findings  - Other management as above. Patient seen and examined on 4/1/2021, case discussed with Dr. Loco Granda. Patient speaks Leandro and Parker, provider speaks Parker. This is a 66F with history as above who presents to the hospital with complaints of a worsening cough and SOB for the past few weeks and fevers for the past few days. Patient reports a history of a chronic cough and reliance on supplemental O2 but over the past few weeks said that her breathing and cough both got substantially worse. Reports increased sputum production (mostly yellow, did have some hemoptysis a few days prior to admission) and fevers (reports temps to 99s at home). Also with dysuria and decreased PO intake. No other complaints.   - Imaging here concerning for new areas of PNA, given her history of Pseudomonas concern for pseudomonas PNA, would therefore place patient on zosyn, c/w above inhaler therapy management, pulm eval in AM, suspect her mild hemoptysis a few days prior to admission is related to exacerbation of her acute disease, CT Chest with new PNA and otherwise chronic findings, no other acute findings  - Other management as above. Patient seen and examined on 4/1/2021, case discussed with Dr. Loco Granda. Patient speaks Leandro and Parker, provider speaks Parker. This is a 66F with history as above who presents to the hospital with complaints of a worsening cough and SOB for the past few weeks and fevers for the past few days. Patient reports a history of a chronic cough and reliance on supplemental O2 but over the past few weeks said that her breathing and cough both got substantially worse. Reports increased sputum production (mostly yellow, did have some hemoptysis a few days prior to admission) and fevers (reports temps to 99s at home). Also with dysuria and decreased PO intake. No other complaints.   - Imaging here concerning for new areas of PNA, given her history of Pseudomonas concern for pseudomonas PNA, would therefore place patient on zosyn, c/w above inhaler therapy management, pulm eval in AM, suspect her mild hemoptysis a few days prior to admission is related to exacerbation of her chronic disease, CT Chest with new PNA and otherwise chronic findings, no other acute findings  - Other management as above.

## 2021-04-01 NOTE — H&P ADULT - PROBLEM SELECTOR PROBLEM 1
Pneumonia of both lungs due to infectious organism, unspecified part of lung Sepsis due to pneumonia

## 2021-04-01 NOTE — H&P ADULT - NSHPREVIEWOFSYSTEMS_GEN_ALL_CORE
CONSTITUTIONAL: No weakness, fevers or chills  EYES/ENT: No visual changes;  No vertigo or throat pain   NECK: No pain or stiffness  RESPIRATORY: +Cough productive of yellow sputum. +Shortness of breath. No wheezing or hemoptysis  CARDIOVASCULAR: +Left-sided chest pain with coughing. No palpitations  GASTROINTESTINAL: No abdominal or epigastric pain. No nausea, vomiting, or hematemesis; No diarrhea or constipation. No melena or hematochezia.  GENITOURINARY: +Dysuria. No increased frequency or hematuria  NEUROLOGICAL: No numbness or weakness  SKIN: No itching, burning, rashes, or lesions   All other review of systems is negative unless indicated above. CONSTITUTIONAL: +Fevers.   EYES/ENT: No visual changes;  No vertigo or throat pain   NECK: No pain or stiffness  RESPIRATORY: +Cough productive of yellow sputum. +Shortness of breath. No wheezing or hemoptysis  CARDIOVASCULAR: +Left-sided chest pain with coughing. No palpitations  GASTROINTESTINAL: No abdominal or epigastric pain. No nausea, vomiting, or hematemesis; No diarrhea or constipation. No melena or hematochezia.  GENITOURINARY: +Dysuria. No increased frequency or hematuria  NEUROLOGICAL: No numbness or weakness  SKIN: No itching, burning, rashes, or lesions   All other review of systems is negative unless indicated above. CONSTITUTIONAL: +Fevers.   EYES: No visual changes or eye discharge  ENT: +Chronic sinusitis, no new rhinorrhea, no sore throat  NECK: No pain or stiffness  RESPIRATORY: +Cough productive of yellow sputum. +Shortness of breath. No wheezing or current hemoptysis  CARDIOVASCULAR: +Left-sided chest pain with coughing. No palpitations  GASTROINTESTINAL: No abdominal or epigastric pain. No nausea, vomiting, or hematemesis; No diarrhea or constipation. No melena or hematochezia.  GENITOURINARY: +Dysuria. No increased frequency or hematuria  NEUROLOGICAL: No numbness or weakness  SKIN: No itching, burning, rashes, or lesions CONSTITUTIONAL: +Fevers.   EYES: No visual changes or eye discharge  ENT: +Chronic sinusitis, no new rhinorrhea, no sore throat  NECK: No pain or stiffness  RESPIRATORY: +Cough productive of yellow sputum. +Shortness of breath. No wheezing or hemoptysis  CARDIOVASCULAR: +Left-sided chest pain with coughing. No palpitations  GASTROINTESTINAL: No abdominal or epigastric pain. No nausea, vomiting, or hematemesis; No diarrhea or constipation. No melena or hematochezia.  GENITOURINARY: +Dysuria. No increased frequency or hematuria  NEUROLOGICAL: No numbness or weakness  SKIN: No itching, burning, rashes, or lesions

## 2021-04-01 NOTE — PROGRESS NOTE ADULT - PROBLEM SELECTOR PLAN 4
On omeprazole at home  - c/w equivalent pantoprazole 40mg qd On omeprazole at home  - c/w equivalent pantoprazole 40mg qd  constipation - bowel regimen

## 2021-04-01 NOTE — H&P ADULT - NSHPLABSRESULTS_GEN_ALL_CORE
11.7   12.47 )-----------( 213      ( 31 Mar 2021 19:32 )             37.7       03-31    136  |  98  |  22  ----------------------------<  286<H>  4.9   |  30  |  0.51    Ca    9.8      31 Mar 2021 19:32    TPro  6.9  /  Alb  3.3  /  TBili  <0.2  /  DBili  x   /  AST  27  /  ALT  41<H>  /  AlkPhos  189<H>  03-31        Lactate Trend  Blood Gas Venous - Lactate: 1.1 mmol/L (03.31.21 @ 19:32)    CAPILLARY BLOOD GLUCOSE  POCT Blood Glucose.: 232 mg/dL (31 Mar 2021 22:29)    EKG: NSR at 82bpm      RADIOLOGY & ADDITIONAL TESTING:  < from: Xray Chest 1 View- PORTABLE-Urgent (03.31.21 @ 19:21) >    FINDINGS:    No pleural effusion or pneumothorax.  Normal heart size.  Bilateral lung hazy opacities.  No acute osseous abnormality.    IMPRESSION:  Bilateral lung hazy opacities may represent atypical pneumonia.  ******PRELIMINARY REPORT******    ******PRELIMINARY REPORT******          NORMAN ESCAMILLA MD; Resident Radiology  < end of copied text > LABS and ADDITIONAL STUDIES:                11.7   12.47 )-----------( 213      ( 31 Mar 2021 19:32 )              37.7     03-31    136  |  98  |  22  ----------------------------<  286<H>  4.9   |  30  |  0.51    Ca    9.8      31 Mar 2021 19:32    TPro  6.9  /  Alb  3.3  /  TBili  <0.2  /  DBili  x   /  AST  27  /  ALT  41<H>  /  AlkPhos  189<H>  03-31        Lactate Trend  Blood Gas Venous - Lactate: 1.1 mmol/L (03.31.21 @ 19:32)    CAPILLARY BLOOD GLUCOSE  POCT Blood Glucose.: 232 mg/dL (31 Mar 2021 22:29)    EKG: NSR at 82bpm      RADIOLOGY & ADDITIONAL TESTING:  < from: Xray Chest 1 View- PORTABLE-Urgent (03.31.21 @ 19:21) >    FINDINGS:    No pleural effusion or pneumothorax.  Normal heart size.  Bilateral lung hazy opacities.  No acute osseous abnormality.    IMPRESSION:  Bilateral lung hazy opacities may represent atypical pneumonia.  ******PRELIMINARY REPORT******    ******PRELIMINARY REPORT******          NORMAN ESCAMILLA MD; Resident Radiology  < end of copied text > LABS and ADDITIONAL STUDIES:                11.7   12.47 )-----------( 213      ( 31 Mar 2021 19:32 )              37.7   Complete Blood Count + Automated Diff (03.31.21 @ 19:32)   Auto Neutrophil #: 9.73 K/uL   Auto Neutrophil %: 78.1: Differential percentages must be correlated with absolute numbers for   clinical significance. %     03-31    136  |  98  |  22  ----------------------------<  286<H>  4.9   |  30  |  0.51    Ca    9.8      31 Mar 2021 19:32    TPro  6.9  /  Alb  3.3  /  TBili  <0.2  /  DBili  x   /  AST  27  /  ALT  41<H>  /  AlkPhos  189<H>  03-31        Lactate Trend  Blood Gas Venous - Lactate: 1.1 mmol/L (03.31.21 @ 19:32)    CAPILLARY BLOOD GLUCOSE  POCT Blood Glucose.: 232 mg/dL (31 Mar 2021 22:29)    EKG: NSR at 82bpm, QTc 420no significant ST-T wave changes, no acute changes from prior    RADIOLOGY & ADDITIONAL TESTING:  < from: Xray Chest 1 View- PORTABLE-Urgent (03.31.21 @ 19:21) >  FINDINGS:  No pleural effusion or pneumothorax.  Normal heart size.  Bilateral lung hazy opacities.  No acute osseous abnormality.    IMPRESSION:  Bilateral lung hazy opacities may represent atypical pneumonia.  ******PRELIMINARY REPORT******    ******PRELIMINARY REPORT******        NORMAN ESCAMILLA MD; Resident Radiology  < end of copied text >    < from: CT Chest No Cont (03.31.21 @ 21:18) >    IMPRESSION:  When compared to5/14/2019, new groundglass opacities and superimposed areas of consolidation seen in the left apex and anterior portion of the left upper lobe concerning for pneumonia.    Chronic findings of bronchiectasis/bronchial wall thickening and mucoid impacted airways..    < end of copied text >

## 2021-04-01 NOTE — PROGRESS NOTE ADULT - PROBLEM SELECTOR PLAN 5
's-140's/70's. On amlodipine at home  - Holding antihypertensives i/s/o sepsis 2/2 PNA On amlodipine at home - held, restart if bp remains stable

## 2021-04-01 NOTE — PROGRESS NOTE ADULT - ASSESSMENT
66F h/o diffuse cystitic bronchiectasis, primary ciliary dyskinesia, allergic bronchopulmonary aspergillosis, pseudomonas pneumonia, HTN, DM2 presents with two weeks of worsening shortness of breath and two days of fevers, admitted for sepsis due to pneumonia, possibly due to Pseudomonas resistant to fluoroquinolones. 66F on home O2 2.5L, diffuse cystitic bronchiectasis, primary ciliary dyskinesia, allergic bronchopulmonary aspergillosis, pseudomonas pneumonia, HTN, DM2 presents with two weeks of worsening shortness of breath and two days of fevers, admitted for sepsis due to pneumonia, possibly due to Pseudomonas resistant to fluoroquinolones.

## 2021-04-01 NOTE — PROGRESS NOTE ADULT - PROBLEM SELECTOR PLAN 2
Pt started taking prednisone 20mg BID on her own about two weeks ago.  - c/w prednisone 20mg PO BID then taper to prevent adrenal insufficiency  - Pulm consult in the AM Pt started taking prednisone 20mg BID on her own about two weeks ago.  - c/w prednisone 20mg PO BID for now, f/u pulm  dry mouth - Pt started taking prednisone 20mg BID on her own about two weeks ago.  - c/w prednisone 20mg PO BID for now, f/u pulm  dry mouth - trial Nystatin, encourage PO fluids

## 2021-04-01 NOTE — PROGRESS NOTE ADULT - PROBLEM SELECTOR PLAN 1
Leukocytosis and tachypnea. Prior sputum cx's grew HIB and Pseudomonas. Alternates between inhaled tobra and azithromycin but pt having fevers, worsening dyspnea and cough w/ increased sputum production concerning for Pseudomonas pneumonia.  - Lactate initially 1.1  - Received Zosyn x2 in the ED. c/w Zosyn 3.375g q8h  - BCx x2 sent, f/u results. Will send sputum cx, and UA/UCx (given complaints of dysuria, family reports patient with history of UTIs in past)  - c/w nebulized budesonide, PRN albuterol nebs, and inhaled hypertonic saline.   - Chest PT Leukocytosis and tachypnea. Prior sputum cx's grew HIB and Pseudomonas. Alternates between inhaled tobra and azithromycin but pt having fevers, worsening dyspnea and cough w/ increased sputum production concerning for Pseudomonas pneumonia.  - Lactate initially 1.1,  Received Zosyn x2 in the ED. c/w Zosyn 3.375g q8h  - BCx x2 NGTD.  send sputum cx, and UA/UCx pending collection (given complaints of dysuria, family reports patient with history of UTIs in past)  - c/w nebulized budesonide, PRN albuterol nebs, and inhaled hypertonic saline.   - Chest PT  - f/u pulm Leukocytosis and tachypnea. Prior sputum cx's grew HIB and Pseudomonas. Alternates between inhaled tobra and azithromycin but pt having fevers, worsening dyspnea and cough w/ increased sputum production concerning for Pseudomonas pneumonia.  - Lactate initially 1.1,  Received Zosyn x2 in the ED. c/w Zosyn 3.375g q8h  - restart inhaled tobra  - BCx x2 NGTD.  send sputum cx, and UA/UCx pending collection (given complaints of dysuria, family reports patient with history of UTIs in past)  - c/w nebulized budesonide, PRN albuterol nebs, and inhaled hypertonic saline.   - Chest PT  - f/u pulm

## 2021-04-01 NOTE — H&P ADULT - NSICDXPASTMEDICALHX_GEN_ALL_CORE_FT
PAST MEDICAL HISTORY:  ABPA (allergic bronchopulmonary aspergillosis)     Allergic rhinitis     Bronchiectasis     GERD (gastroesophageal reflux disease)     Hypertension     Microcytic anemia     Postnasal drip     Pseudomonas aeruginosa colonization     Recurrent pneumonia     Type 2 diabetes mellitus, with long-term current use of insulin     Vitamin D deficiency

## 2021-04-01 NOTE — H&P ADULT - PROBLEM SELECTOR PLAN 2
Pt was started on prednisone 20mg, unclear if it was 20mg qd or BID  - Will need to clarify prednisone dose with pulmonologist Pt started taking prednisone 20mg BID on her own about two weeks ago.  - c/w prednisone 20mg PO BID then taper to prevent adrenal insufficiency  - Pulm consult in the AM

## 2021-04-01 NOTE — PROGRESS NOTE ADULT - PROBLEM SELECTOR PLAN 6
These were completed and signed   DVT ppx: Lovenox  Diet: CC DVT ppx: Lovenox  Diet: CC  d/w daughter in law Cohn [phone]

## 2021-04-01 NOTE — H&P ADULT - PROBLEM SELECTOR PLAN 3
Per pt, on Basaglar 10u qhs and Admelog 2u qAC at home. Hyperglycemic to 312 on initial presentation  - Obtain collateral regarding metformin and glimepiride use  - Pt did not take basal insulin prior to coming to the hospital. Give reduced dose Lantus 8u  - Check HbA1c  - FS and low ISS TID qAC and qhs

## 2021-04-01 NOTE — H&P ADULT - ASSESSMENT
66F h/o diffuse cystitic bronchiectasis, primary ciliary dyskinesia, allergic bronchopulmonary aspergillosis, pseudomonas pneumonia, HTN, DM2 presents with two weeks of worsening shortness of breath and two days of fevers, admitted for recurrent pneumonia, possibly due to Pseudomonas resistant to fluoroquinolones. 66F h/o diffuse cystitic bronchiectasis, primary ciliary dyskinesia, allergic bronchopulmonary aspergillosis, pseudomonas pneumonia, HTN, DM2 presents with two weeks of worsening shortness of breath and two days of fevers, admitted for sepsis due to pneumonia, possibly due to Pseudomonas resistant to fluoroquinolones.

## 2021-04-01 NOTE — H&P ADULT - HISTORY OF PRESENT ILLNESS
66F h/o diffuse cystitic bronchiectasis, primary ciliary dyskinesia, allergic bronchopulmonary aspergillosis, pseudomonas pneumonia, HTN, DM2 presents with two weeks of worsening shortness of breath and two days of fevers. Pt reports  66F h/o diffuse cystitic bronchiectasis, primary ciliary dyskinesia, allergic bronchopulmonary aspergillosis, pseudomonas pneumonia, HTN, DM2 presents with two weeks of worsening shortness of breath and two days of fevers. Pt reports history of recurrent pneumonia; per chart, sputum cultures grew HIB (Jan 2020) and Pseudomonas aeruginosa (July and August 2020). Per chart, pt alternates inhaled tobramycin and azithromycin for the bronchiectasis and was taking prednisone 20mg (unclear if it was 20mg qd or BID). Pt developed fevers (Tmax 101 at home) two weeks ago and has had worsening shortness of breath and cough productive of yellow sputum. She also has left-sided chest pain that radiates to the left arm and back when she coughs. Also reporting dysuria and decreased PO intake over the past few days due to nausea but currently not nauseous. Pt still having constipation despite taking stool softeners and laxatives. She did not take any basal insulin prior to coming to the hospital. Denies vomiting, abdominal pain, increased urinary frequency, hematuria, hematochezia, and melena.    In the ED, T 98F, HR 84, /61, RR 24, SpO2 98% on 2LPM. Received Zosyn x2. 66F h/o diffuse cystitic bronchiectasis, primary ciliary dyskinesia, allergic bronchopulmonary aspergillosis, pseudomonas pneumonia, HTN, DM2 presents with two weeks of worsening shortness of breath and two days of fevers. Pt reports history of recurrent pneumonia; per chart, sputum cultures grew HIB (Jan 2020) and Pseudomonas aeruginosa (July and August 2020). Per chart, pt alternates inhaled tobramycin and azithromycin for the bronchiectasis and was taking prednisone 20mg (unclear if it was 20mg qd or BID). Pt developed fevers (Tmax 101 at home) two weeks ago and has had worsening shortness of breath and cough productive of yellow sputum (also reports some occasional small hemoptysis a few days prior to admission). She also has left-sided chest pain that radiates to the left arm and back when she coughs (no chest pain on exertion). Also reporting dysuria and decreased PO intake over the past few days due to nausea but currently not nauseous. Pt still having constipation despite taking stool softeners and laxatives. She did not take any basal insulin prior to coming to the hospital. Denies vomiting, abdominal pain, increased urinary frequency, hematuria, hematochezia, and melena.    In the ED, T 98F, HR 84, /61, RR 24, SpO2 98% on 2LPM. Received Zosyn x2. 66F h/o diffuse cystitic bronchiectasis, primary ciliary dyskinesia, allergic bronchopulmonary aspergillosis, pseudomonas pneumonia, HTN, DM2 presents with two weeks of worsening shortness of breath and two days of fevers. Pt reports history of recurrent pneumonia; per chart, sputum cultures grew HIB (Jan 2020) and Pseudomonas aeruginosa (July and August 2020). Per chart, pt alternates inhaled tobramycin and azithromycin for the bronchiectasis and was taking prednisone 20mg (unclear if it was 20mg qd or BID). Pt developed fevers (Tmax 101 at home) two weeks ago and has had worsening shortness of breath and cough productive of yellow sputum. She also has left-sided chest pain that radiates to the left arm and back when she coughs (no chest pain on exertion). Also reporting dysuria and decreased PO intake over the past few days due to nausea but currently not nauseous. Pt still having constipation despite taking stool softeners and laxatives. She did not take any basal insulin prior to coming to the hospital. Denies vomiting, abdominal pain, increased urinary frequency, hematuria, hematochezia, and melena.    In the ED, T 98F, HR 84, /61, RR 24, SpO2 98% on 2LPM. Received Zosyn x2. 66F h/o diffuse cystitic bronchiectasis, primary ciliary dyskinesia, allergic bronchopulmonary aspergillosis, pseudomonas pneumonia, HTN, DM2 presents with two weeks of worsening shortness of breath and two days of fevers. Pt reports history of recurrent pneumonia; per chart, sputum cultures grew HIB (Jan 2020) and Pseudomonas aeruginosa (July and August 2020). Per chart, pt alternates inhaled tobramycin and azithromycin for the bronchiectasis and was taking prednisone 20mg (unclear if it was 20mg qd or BID). Pt developed fevers (Tmax 101 at home) two weeks ago and has had worsening shortness of breath and cough productive of yellow sputum (does report some mild hemoptysis about 5-6 days prior to admission, none currently). She also has left-sided chest pain that radiates to the left arm and back when she coughs (no chest pain on exertion). Also reporting dysuria and decreased PO intake over the past few days due to nausea but currently not nauseous. Pt still having constipation despite taking stool softeners and laxatives. She did not take any basal insulin prior to coming to the hospital. Denies vomiting, abdominal pain, increased urinary frequency, hematuria, hematochezia, and melena.    In the ED, T 98F, HR 84, /61, RR 24, SpO2 98% on 2LPM. Received Zosyn x2.

## 2021-04-01 NOTE — PROGRESS NOTE ADULT - PROBLEM SELECTOR PLAN 3
Per pt, on Basaglar 10u qhs and Admelog 2u qAC at home. Hyperglycemic to 312 on initial presentation  - Obtain collateral regarding metformin and glimepiride use  - Pt did not take basal insulin prior to coming to the hospital. Give reduced dose Lantus 8u  - Check HbA1c  - FS and low ISS TID qAC and qhs Per pt, on Basaglar 10u qhs and Admelog 2u qAC at home -->   - Obtain collateral regarding metformin and glimepiride use  - Check HbA1c  - FS and low ISS TID qAC and qhs Per pt, on Basaglar 10u qhs and Admelog 2u qAC at home --> increase lantus to 15  - Check HbA1c, family reports FSBGs been running high  - FS and low ISS TID qAC and qhs

## 2021-04-01 NOTE — H&P ADULT - PROBLEM SELECTOR PLAN 1
Leukocytosis and tachypnea. Prior sputum cx's grew HIB and Pseudomonas. Alternates between inhaled tobra and azithromycin but pt having fevers, worsening dyspnea and cough w/ increased sputum production concerning for Pseudomonas pneumonia.  - Lactate initially 1.1  - Received Zosyn x2 in the ED. c/w Zosyn 3.375g q8h  - BCx x2 sent, f/u results. Will send sputum cx, UA, and UCx Leukocytosis and tachypnea. Prior sputum cx's grew HIB and Pseudomonas. Alternates between inhaled tobra and azithromycin but pt having fevers, worsening dyspnea and cough w/ increased sputum production concerning for Pseudomonas pneumonia.  - Lactate initially 1.1  - Received Zosyn x2 in the ED. c/w Zosyn 3.375g q8h  - BCx x2 sent, f/u results. Will send sputum cx, UA, and UCx  - c/w nebulized budesonide, PRN albuterol nebs, and inhaled hypertonic saline.   - Chest PT Leukocytosis and tachypnea. Prior sputum cx's grew HIB and Pseudomonas. Alternates between inhaled tobra and azithromycin but pt having fevers, worsening dyspnea and cough w/ increased sputum production concerning for Pseudomonas pneumonia.  - Lactate initially 1.1  - Received Zosyn x2 in the ED. c/w Zosyn 3.375g q8h  - BCx x2 sent, f/u results. Will send sputum cx, and UA/UCx (given complaints of dysuria, family reports patient with history of UTIs in past)  - c/w nebulized budesonide, PRN albuterol nebs, and inhaled hypertonic saline.   - Chest PT

## 2021-04-01 NOTE — PROGRESS NOTE ADULT - SUBJECTIVE AND OBJECTIVE BOX
[facility] Division of Hospital Medicine  Radha Barlow MD  Pager (M-F, 8A-5P):  In-house pager [phone]; Long-range pager [phone]  Other Times:  Please page Hospitalist in Charge -  In-house pager [phone]    Patient is a 66y old  Female who presents with a chief complaint of Pneumonia (01 Apr 2021 07:54)      SUBJECTIVE / OVERNIGHT EVENTS: ....  ADDITIONAL REVIEW OF SYSTEMS:    MEDICATIONS  (STANDING):  buDESOnide    Inhalation Suspension 0.5 milliGRAM(s) Inhalation every 12 hours  dextrose 40% Gel 15 Gram(s) Oral once  dextrose 5%. 1000 milliLiter(s) (50 mL/Hr) IV Continuous <Continuous>  dextrose 5%. 1000 milliLiter(s) (100 mL/Hr) IV Continuous <Continuous>  dextrose 50% Injectable 25 Gram(s) IV Push once  dextrose 50% Injectable 12.5 Gram(s) IV Push once  dextrose 50% Injectable 25 Gram(s) IV Push once  enoxaparin Injectable 40 milliGRAM(s) SubCutaneous daily  glucagon  Injectable 1 milliGRAM(s) IntraMuscular once  influenza   Vaccine 0.5 milliLiter(s) IntraMuscular once  insulin glargine Injectable (LANTUS) 8 Unit(s) SubCutaneous at bedtime  insulin lispro (ADMELOG) corrective regimen sliding scale   SubCutaneous three times a day before meals  insulin lispro (ADMELOG) corrective regimen sliding scale   SubCutaneous at bedtime  montelukast 10 milliGRAM(s) Oral daily  pantoprazole    Tablet 40 milliGRAM(s) Oral before breakfast  piperacillin/tazobactam IVPB.. 3.375 Gram(s) IV Intermittent every 8 hours  polyethylene glycol 3350 17 Gram(s) Oral daily  potassium phosphate / sodium phosphate Tablet (K-PHOS No. 2) 1 Tablet(s) Oral three times a day before meals  predniSONE   Tablet 20 milliGRAM(s) Oral two times a day  senna 2 Tablet(s) Oral at bedtime  sodium chloride 3%  Inhalation 4 milliLiter(s) Inhalation two times a day  venlafaxine XR. 37.5 milliGRAM(s) Oral daily    MEDICATIONS  (PRN):  ALBUTerol    0.083% 2.5 milliGRAM(s) Nebulizer every 6 hours PRN Shortness of Breath and/or Wheezing      CAPILLARY BLOOD GLUCOSE      POCT Blood Glucose.: 131 mg/dL (01 Apr 2021 08:53)  POCT Blood Glucose.: 196 mg/dL (01 Apr 2021 04:08)  POCT Blood Glucose.: 243 mg/dL (01 Apr 2021 02:30)  POCT Blood Glucose.: 232 mg/dL (31 Mar 2021 22:29)  POCT Blood Glucose.: 312 mg/dL (31 Mar 2021 18:03)    I&O's Summary      PHYSICAL EXAM:  Vital Signs Last 24 Hrs  T(C): 36.6 (01 Apr 2021 09:44), Max: 36.8 (01 Apr 2021 04:39)  T(F): 97.8 (01 Apr 2021 09:44), Max: 98.2 (01 Apr 2021 04:39)  HR: 88 (01 Apr 2021 11:01) (74 - 98)  BP: 132/62 (01 Apr 2021 09:44) (128/71 - 145/77)  BP(mean): --  RR: 18 (01 Apr 2021 09:44) (17 - 24)  SpO2: 99% (01 Apr 2021 11:01) (97% - 100%)    GENERAL: No acute distress. On supplemental O2 via NC at 4LPM  HEENT: Dry oral mucous membranes  NECK: Supple, no lymphadenopathy, no JVD  CHEST/LUNG: Diffuse crackles, no accessary muscle use  HEART: Regular rate and rhythm. Normal S1/S2. No murmurs, rubs, or gallops  ABDOMEN: Soft, non-tender, non-distended; normal bowel sounds, no organomegaly  EXTREMITIES:  2+ peripheral pulses b/l, No clubbing, cyanosis, or edema  NEUROLOGY: A&O x 3, no focal deficits  SKIN: No rashes or lesions    LABS:                        13.2   9.03  )-----------( 207      ( 01 Apr 2021 08:07 )             42.1     04-01    139  |  100  |  14  ----------------------------<  151<H>  3.7   |  33<H>  |  0.45<L>    Ca    9.5      01 Apr 2021 08:07  Phos  2.4     04-01  Mg     1.8     04-01    TPro  7.1  /  Alb  3.3  /  TBili  0.3  /  DBili  x   /  AST  26  /  ALT  38<H>  /  AlkPhos  184<H>  04-01                RADIOLOGY & ADDITIONAL TESTS:  Results Reviewed:   Imaging Personally Reviewed:  Electrocardiogram Personally Reviewed:    COORDINATION OF CARE:  Care Discussed with Consultants/Other Providers [Y/N]: RN, SW, CM, ACP, pulm re overall care   Prior or Outpatient Records Reviewed [Y/N]:   [facility] Division of Hospital Medicine  Radha Barlow MD  Pager (M-F, 8A-5P):  In-house pager [phone]; Long-range pager [phone]  Other Times:  Please page Hospitalist in Charge -  In-house pager [phone]    Patient is a 66y old  Female who presents with a chief complaint of Pneumonia (01 Apr 2021 07:54)    SUBJECTIVE / OVERNIGHT EVENTS: Pt seen earlier, per patient's request daughter in law Cohn [phone] translated.  Pt says she's "feeling a little lighter because the mucous is coming out." Hurts when she cough, she's been coughing a lot, said she will try Tylenol.  Small BM this morning but hard, feels constipated.  C/o dry mouth, sometimes feels like food gets stuck. Not drinking a lot of water.    ADDITIONAL REVIEW OF SYSTEMS:    MEDICATIONS  (STANDING):  buDESOnide    Inhalation Suspension 0.5 milliGRAM(s) Inhalation every 12 hours  dextrose 40% Gel 15 Gram(s) Oral once  dextrose 5%. 1000 milliLiter(s) (50 mL/Hr) IV Continuous <Continuous>  dextrose 5%. 1000 milliLiter(s) (100 mL/Hr) IV Continuous <Continuous>  dextrose 50% Injectable 25 Gram(s) IV Push once  dextrose 50% Injectable 12.5 Gram(s) IV Push once  dextrose 50% Injectable 25 Gram(s) IV Push once  enoxaparin Injectable 40 milliGRAM(s) SubCutaneous daily  glucagon  Injectable 1 milliGRAM(s) IntraMuscular once  influenza   Vaccine 0.5 milliLiter(s) IntraMuscular once  insulin glargine Injectable (LANTUS) 8 Unit(s) SubCutaneous at bedtime  insulin lispro (ADMELOG) corrective regimen sliding scale   SubCutaneous three times a day before meals  insulin lispro (ADMELOG) corrective regimen sliding scale   SubCutaneous at bedtime  montelukast 10 milliGRAM(s) Oral daily  pantoprazole    Tablet 40 milliGRAM(s) Oral before breakfast  piperacillin/tazobactam IVPB.. 3.375 Gram(s) IV Intermittent every 8 hours  polyethylene glycol 3350 17 Gram(s) Oral daily  potassium phosphate / sodium phosphate Tablet (K-PHOS No. 2) 1 Tablet(s) Oral three times a day before meals  predniSONE   Tablet 20 milliGRAM(s) Oral two times a day  senna 2 Tablet(s) Oral at bedtime  sodium chloride 3%  Inhalation 4 milliLiter(s) Inhalation two times a day  venlafaxine XR. 37.5 milliGRAM(s) Oral daily    MEDICATIONS  (PRN):  ALBUTerol    0.083% 2.5 milliGRAM(s) Nebulizer every 6 hours PRN Shortness of Breath and/or Wheezing      CAPILLARY BLOOD GLUCOSE      POCT Blood Glucose.: 131 mg/dL (01 Apr 2021 08:53)  POCT Blood Glucose.: 196 mg/dL (01 Apr 2021 04:08)  POCT Blood Glucose.: 243 mg/dL (01 Apr 2021 02:30)  POCT Blood Glucose.: 232 mg/dL (31 Mar 2021 22:29)  POCT Blood Glucose.: 312 mg/dL (31 Mar 2021 18:03)    I&O's Summary      PHYSICAL EXAM:  Vital Signs Last 24 Hrs  T(C): 36.6 (01 Apr 2021 09:44), Max: 36.8 (01 Apr 2021 04:39)  T(F): 97.8 (01 Apr 2021 09:44), Max: 98.2 (01 Apr 2021 04:39)  HR: 88 (01 Apr 2021 11:01) (74 - 98)  BP: 132/62 (01 Apr 2021 09:44) (128/71 - 145/77)  BP(mean): --  RR: 18 (01 Apr 2021 09:44) (17 - 24)  SpO2: 99% (01 Apr 2021 11:01) (97% - 100%)    GENERAL: thin, frail female, looks older than stated age, resting in bed, NC O2  HEENT: Dry oral mucous membranes  NECK: Supple, no lymphadenopathy, no JVD  CHEST/LUNG: Diffuse rhonchi bilateraly  HEART: Regular rate and rhythm. Normal S1/S2. No murmurs, rubs, or gallops  ABDOMEN: Soft, non-tender, non-distended; normal bowel sounds, no organomegaly  EXTREMITIES:  2+ peripheral pulses b/l, No clubbing, cyanosis, or edema  NEUROLOGY: A&O x 3, no focal deficits  SKIN: No rashes or lesions    LABS:                        13.2   9.03  )-----------( 207      ( 01 Apr 2021 08:07 )             42.1     04-01    139  |  100  |  14  ----------------------------<  151<H>  3.7   |  33<H>  |  0.45<L>    Ca    9.5      01 Apr 2021 08:07  Phos  2.4     04-01  Mg     1.8     04-01    TPro  7.1  /  Alb  3.3  /  TBili  0.3  /  DBili  x   /  AST  26  /  ALT  38<H>  /  AlkPhos  184<H>  04-01    RADIOLOGY & ADDITIONAL TESTS:  < from: CT Chest No Cont (03.31.21 @ 21:18) >  LUNGS AND AIRWAYS: Bilateral bronchiectasis, bronchial wall thickening, tree-in-bud opacities, and mosaic attenuation that are unchanged since 7/31/2018. The right upper lobe cyst (series 2 image 42) remains opacified which is new since 2018. Opacified right upper lobe cyst measures 1.5 x 1.1 cm, previously measuring 1.9 x 1.5 cm.    When compared to 5/14/2019, there are new groundglass opacities and superimposed areas of consolidation seen in the anterior portion of the left upper lobe and left apex. Unchanged linear opacity/scarring in the right apex.    PLEURA: No pleural effusion. No pneumothorax.  MEDIASTINUM AND STEPHANIE: No lymphadenopathy.  VESSELS: Within normal limits.  HEART: Heart size is normal. No pericardial effusion.  CHEST WALL AND LOWER NECK: Within normal limits.  VISUALIZED UPPER ABDOMEN: Pneumobilia as on prior. Partially imaged focal area of calcification, likely corresponding to the curvilinear high density adjacent to the second portion of the duodenum as on prior study from July 30, 2018.  BONES: Degenerative changes of the spine.    IMPRESSION:  When compared to5/14/2019, new groundglass opacities and superimposed areas of consolidation seen in the left apex and anterior portion of the left upper lobe concerning for pneumonia.  Chronic findings of bronchiectasis/bronchial wall thickening and mucoid impacted airways.    Results Reviewed:   Imaging Personally Reviewed:  Electrocardiogram Personally Reviewed:    COORDINATION OF CARE:  Care Discussed with Consultants/Other Providers [Y/N]: RN, SW, CM, ACP, pulm re overall care   Prior or Outpatient Records Reviewed [Y/N]:

## 2021-04-02 LAB
ALBUMIN SERPL ELPH-MCNC: 3.1 G/DL — LOW (ref 3.3–5)
ALP SERPL-CCNC: 202 U/L — HIGH (ref 40–120)
ALT FLD-CCNC: 55 U/L — HIGH (ref 4–33)
ANION GAP SERPL CALC-SCNC: 7 MMOL/L — SIGNIFICANT CHANGE UP (ref 7–14)
AST SERPL-CCNC: 52 U/L — HIGH (ref 4–32)
BASOPHILS # BLD AUTO: 0.03 K/UL — SIGNIFICANT CHANGE UP (ref 0–0.2)
BASOPHILS NFR BLD AUTO: 0.4 % — SIGNIFICANT CHANGE UP (ref 0–2)
BILIRUB SERPL-MCNC: 0.3 MG/DL — SIGNIFICANT CHANGE UP (ref 0.2–1.2)
BUN SERPL-MCNC: 13 MG/DL — SIGNIFICANT CHANGE UP (ref 7–23)
CALCIUM SERPL-MCNC: 9.3 MG/DL — SIGNIFICANT CHANGE UP (ref 8.4–10.5)
CHLORIDE SERPL-SCNC: 96 MMOL/L — LOW (ref 98–107)
CO2 SERPL-SCNC: 32 MMOL/L — HIGH (ref 22–31)
COVID-19 SPIKE DOMAIN AB INTERP: NEGATIVE — SIGNIFICANT CHANGE UP
COVID-19 SPIKE DOMAIN ANTIBODY RESULT: 0.4 U/ML — SIGNIFICANT CHANGE UP
CREAT SERPL-MCNC: 0.38 MG/DL — LOW (ref 0.5–1.3)
EOSINOPHIL # BLD AUTO: 0.01 K/UL — SIGNIFICANT CHANGE UP (ref 0–0.5)
EOSINOPHIL NFR BLD AUTO: 0.1 % — SIGNIFICANT CHANGE UP (ref 0–6)
GLUCOSE BLDC GLUCOMTR-MCNC: 169 MG/DL — HIGH (ref 70–99)
GLUCOSE BLDC GLUCOMTR-MCNC: 273 MG/DL — HIGH (ref 70–99)
GLUCOSE BLDC GLUCOMTR-MCNC: 339 MG/DL — HIGH (ref 70–99)
GLUCOSE BLDC GLUCOMTR-MCNC: 395 MG/DL — HIGH (ref 70–99)
GLUCOSE SERPL-MCNC: 219 MG/DL — HIGH (ref 70–99)
HCT VFR BLD CALC: 36.2 % — SIGNIFICANT CHANGE UP (ref 34.5–45)
HGB BLD-MCNC: 11.4 G/DL — LOW (ref 11.5–15.5)
IANC: 6.43 K/UL — SIGNIFICANT CHANGE UP (ref 1.5–8.5)
IMM GRANULOCYTES NFR BLD AUTO: 0.5 % — SIGNIFICANT CHANGE UP (ref 0–1.5)
LYMPHOCYTES # BLD AUTO: 1.14 K/UL — SIGNIFICANT CHANGE UP (ref 1–3.3)
LYMPHOCYTES # BLD AUTO: 14.3 % — SIGNIFICANT CHANGE UP (ref 13–44)
MAGNESIUM SERPL-MCNC: 1.9 MG/DL — SIGNIFICANT CHANGE UP (ref 1.6–2.6)
MCHC RBC-ENTMCNC: 27.8 PG — SIGNIFICANT CHANGE UP (ref 27–34)
MCHC RBC-ENTMCNC: 31.5 GM/DL — LOW (ref 32–36)
MCV RBC AUTO: 88.3 FL — SIGNIFICANT CHANGE UP (ref 80–100)
MONOCYTES # BLD AUTO: 0.32 K/UL — SIGNIFICANT CHANGE UP (ref 0–0.9)
MONOCYTES NFR BLD AUTO: 4 % — SIGNIFICANT CHANGE UP (ref 2–14)
NEUTROPHILS # BLD AUTO: 6.43 K/UL — SIGNIFICANT CHANGE UP (ref 1.8–7.4)
NEUTROPHILS NFR BLD AUTO: 80.7 % — HIGH (ref 43–77)
NRBC # BLD: 0 /100 WBCS — SIGNIFICANT CHANGE UP
NRBC # FLD: 0 K/UL — SIGNIFICANT CHANGE UP
PHOSPHATE SERPL-MCNC: 3 MG/DL — SIGNIFICANT CHANGE UP (ref 2.5–4.5)
PLATELET # BLD AUTO: 208 K/UL — SIGNIFICANT CHANGE UP (ref 150–400)
POTASSIUM SERPL-MCNC: 3.9 MMOL/L — SIGNIFICANT CHANGE UP (ref 3.5–5.3)
POTASSIUM SERPL-SCNC: 3.9 MMOL/L — SIGNIFICANT CHANGE UP (ref 3.5–5.3)
PROT SERPL-MCNC: 6.4 G/DL — SIGNIFICANT CHANGE UP (ref 6–8.3)
RBC # BLD: 4.1 M/UL — SIGNIFICANT CHANGE UP (ref 3.8–5.2)
RBC # FLD: 12.9 % — SIGNIFICANT CHANGE UP (ref 10.3–14.5)
SARS-COV-2 IGG+IGM SERPL QL IA: 0.4 U/ML — SIGNIFICANT CHANGE UP
SARS-COV-2 IGG+IGM SERPL QL IA: NEGATIVE — SIGNIFICANT CHANGE UP
SODIUM SERPL-SCNC: 135 MMOL/L — SIGNIFICANT CHANGE UP (ref 135–145)
WBC # BLD: 7.97 K/UL — SIGNIFICANT CHANGE UP (ref 3.8–10.5)
WBC # FLD AUTO: 7.97 K/UL — SIGNIFICANT CHANGE UP (ref 3.8–10.5)

## 2021-04-02 PROCEDURE — 99233 SBSQ HOSP IP/OBS HIGH 50: CPT

## 2021-04-02 RX ADMIN — Medication 300 MILLIGRAM(S): at 22:02

## 2021-04-02 RX ADMIN — Medication 1 TABLET(S): at 06:46

## 2021-04-02 RX ADMIN — Medication 2 UNIT(S): at 09:09

## 2021-04-02 RX ADMIN — PIPERACILLIN AND TAZOBACTAM 25 GRAM(S): 4; .5 INJECTION, POWDER, LYOPHILIZED, FOR SOLUTION INTRAVENOUS at 02:15

## 2021-04-02 RX ADMIN — Medication 2 UNIT(S): at 13:03

## 2021-04-02 RX ADMIN — Medication 20 MILLIGRAM(S): at 23:49

## 2021-04-02 RX ADMIN — ENOXAPARIN SODIUM 40 MILLIGRAM(S): 100 INJECTION SUBCUTANEOUS at 12:05

## 2021-04-02 RX ADMIN — Medication 3 MILLILITER(S): at 05:16

## 2021-04-02 RX ADMIN — Medication 100 MILLIGRAM(S): at 21:31

## 2021-04-02 RX ADMIN — Medication 2 UNIT(S): at 18:01

## 2021-04-02 RX ADMIN — Medication 1 TABLET(S): at 12:05

## 2021-04-02 RX ADMIN — PIPERACILLIN AND TAZOBACTAM 25 GRAM(S): 4; .5 INJECTION, POWDER, LYOPHILIZED, FOR SOLUTION INTRAVENOUS at 18:09

## 2021-04-02 RX ADMIN — Medication 500000 UNIT(S): at 12:04

## 2021-04-02 RX ADMIN — Medication 325 MILLIGRAM(S): at 12:05

## 2021-04-02 RX ADMIN — Medication 0.5 MILLIGRAM(S): at 22:10

## 2021-04-02 RX ADMIN — MONTELUKAST 10 MILLIGRAM(S): 4 TABLET, CHEWABLE ORAL at 12:05

## 2021-04-02 RX ADMIN — INSULIN GLARGINE 20 UNIT(S): 100 INJECTION, SOLUTION SUBCUTANEOUS at 21:30

## 2021-04-02 RX ADMIN — Medication 500000 UNIT(S): at 18:09

## 2021-04-02 RX ADMIN — SENNA PLUS 2 TABLET(S): 8.6 TABLET ORAL at 23:49

## 2021-04-02 RX ADMIN — Medication 1 SPRAY(S): at 14:30

## 2021-04-02 RX ADMIN — PIPERACILLIN AND TAZOBACTAM 25 GRAM(S): 4; .5 INJECTION, POWDER, LYOPHILIZED, FOR SOLUTION INTRAVENOUS at 09:33

## 2021-04-02 RX ADMIN — PANTOPRAZOLE SODIUM 40 MILLIGRAM(S): 20 TABLET, DELAYED RELEASE ORAL at 06:46

## 2021-04-02 RX ADMIN — Medication 500000 UNIT(S): at 23:50

## 2021-04-02 RX ADMIN — Medication 4: at 13:04

## 2021-04-02 RX ADMIN — Medication 37.5 MILLIGRAM(S): at 14:32

## 2021-04-02 RX ADMIN — Medication 3 MILLILITER(S): at 21:55

## 2021-04-02 RX ADMIN — Medication 100 MILLIGRAM(S): at 14:32

## 2021-04-02 RX ADMIN — POLYETHYLENE GLYCOL 3350 17 GRAM(S): 17 POWDER, FOR SOLUTION ORAL at 12:04

## 2021-04-02 RX ADMIN — Medication 3 MILLILITER(S): at 15:44

## 2021-04-02 RX ADMIN — Medication 20 MILLIGRAM(S): at 12:05

## 2021-04-02 RX ADMIN — Medication 1: at 09:09

## 2021-04-02 RX ADMIN — Medication 3 MILLILITER(S): at 11:03

## 2021-04-02 RX ADMIN — Medication 3: at 21:31

## 2021-04-02 RX ADMIN — Medication 3: at 18:01

## 2021-04-02 RX ADMIN — Medication 100 MILLIGRAM(S): at 06:46

## 2021-04-02 RX ADMIN — Medication 500000 UNIT(S): at 06:46

## 2021-04-02 RX ADMIN — Medication 0.5 MILLIGRAM(S): at 11:03

## 2021-04-02 NOTE — PROGRESS NOTE ADULT - SUBJECTIVE AND OBJECTIVE BOX
CHIEF COMPLAINT:    Interval Events:  Reports mild improvement in breathing this AM, though still with left upper chest/shoulder pain. Also reporting difficulty coughing up secretions/congestion    REVIEW OF SYSTEMS:  Constitutional: [x] negative [ ] fevers [ ] chills [ ] weight loss [ ] weight gain  HEENT: [x] negative [ ] dry eyes [ ] eye irritation [ ] postnasal drip [ ] nasal congestion  CV: [x] negative  [ ] chest pain [ ] orthopnea [ ] palpitations [ ] murmur  Resp: [ ] negative [x] cough [ ] shortness of breath [ ] dyspnea [ ] wheezing [ ] sputum [ ] hemoptysis  GI: [x] negative [ ] nausea [ ] vomiting [ ] diarrhea [ ] constipation [ ] abd pain [ ] dysphagia   : [x] negative [ ] dysuria [ ] nocturia [ ] hematuria [ ] increased urinary frequency  Musculoskeletal: [ ] negative [ ] back pain [ ] myalgias [ ] arthralgias [ ] fracture  Skin: [ ] negative [ ] rash [ ] itch  Neurological: [ ] negative [ ] headache [ ] dizziness [ ] syncope [ ] weakness [ ] numbness  Psychiatric: [ ] negative [ ] anxiety [ ] depression  Endocrine: [ ] negative [ ] diabetes [ ] thyroid problem  Hematologic/Lymphatic: [ ] negative [ ] anemia [ ] bleeding problem  Allergic/Immunologic: [ ] negative [ ] itchy eyes [ ] nasal discharge [ ] hives [ ] angioedema  [ ] All other systems negative  [ ] Unable to assess ROS because ________    OBJECTIVE:  ICU Vital Signs Last 24 Hrs  T(C): 36.6 (2021 09:01), Max: 37 (2021 02:07)  T(F): 97.8 (2021 09:01), Max: 98.6 (2021 02:07)  HR: 71 (2021 09:01) (65 - 89)  BP: 131/79 (2021 09:01) (131/79 - 150/68)  BP(mean): --  ABP: --  ABP(mean): --  RR: 16 (2021 09:01) (16 - 18)  SpO2: 94% (2021 09:01) (94% - 100%)        - @ 07:01  -  - @ 07:00  --------------------------------------------------------  IN: 540 mL / OUT: 0 mL / NET: 540 mL      CAPILLARY BLOOD GLUCOSE      POCT Blood Glucose.: 169 mg/dL (2021 09:03)      PHYSICAL EXAM:  GEN: Elderly female, NAD  HEENT: EOMI  CV: RR  PULM: Rhonchorous, no wheezes, without tachypnea  ABD: Soft  EXT: [facility]    HOSPITAL MEDICATIONS:  MEDICATIONS  (STANDING):  ALBUTerol    90 MICROgram(s) HFA Inhaler 1 Puff(s) Inhalation every 6 hours  albuterol/ipratropium for Nebulization 3 milliLiter(s) Nebulizer every 6 hours  benzonatate 100 milliGRAM(s) Oral three times a day  buDESOnide    Inhalation Suspension 0.5 milliGRAM(s) Inhalation every 12 hours  calcium carbonate 1250 mG  + Vitamin D (OsCal 500 + D) 1 Tablet(s) Oral daily  cyanocobalamin Injectable 1000 MICROGram(s) IntraMuscular <User Schedule>  dextrose 40% Gel 15 Gram(s) Oral once  dextrose 5%. 1000 milliLiter(s) (50 mL/Hr) IV Continuous <Continuous>  dextrose 5%. 1000 milliLiter(s) (100 mL/Hr) IV Continuous <Continuous>  dextrose 50% Injectable 25 Gram(s) IV Push once  dextrose 50% Injectable 12.5 Gram(s) IV Push once  dextrose 50% Injectable 25 Gram(s) IV Push once  enoxaparin Injectable 40 milliGRAM(s) SubCutaneous daily  ferrous    sulfate 325 milliGRAM(s) Oral daily  fluticasone propionate 50 MICROgram(s)/spray Nasal Spray 1 Spray(s) Both Nostrils <User Schedule>  glucagon  Injectable 1 milliGRAM(s) IntraMuscular once  influenza   Vaccine 0.5 milliLiter(s) IntraMuscular once  insulin glargine Injectable (LANTUS) 20 Unit(s) SubCutaneous at bedtime  insulin lispro (ADMELOG) corrective regimen sliding scale   SubCutaneous three times a day before meals  insulin lispro (ADMELOG) corrective regimen sliding scale   SubCutaneous at bedtime  insulin lispro Injectable (ADMELOG) 2 Unit(s) SubCutaneous before breakfast  insulin lispro Injectable (ADMELOG) 2 Unit(s) SubCutaneous before lunch  insulin lispro Injectable (ADMELOG) 2 Unit(s) SubCutaneous before dinner  montelukast 10 milliGRAM(s) Oral daily  nystatin    Suspension 774603 Unit(s) Swish and Swallow four times a day  pantoprazole    Tablet 40 milliGRAM(s) Oral before breakfast  piperacillin/tazobactam IVPB.. 3.375 Gram(s) IV Intermittent every 8 hours  polyethylene glycol 3350 17 Gram(s) Oral daily  predniSONE   Tablet 20 milliGRAM(s) Oral two times a day  senna 2 Tablet(s) Oral at bedtime  tiotropium 18 MICROgram(s) Capsule 1 Capsule(s) Inhalation daily  tobramycin for Nebulization 300 milliGRAM(s) Inhalation every 12 hours  venlafaxine XR. 37.5 milliGRAM(s) Oral daily    MEDICATIONS  (PRN):  acetaminophen   Tablet .. 650 milliGRAM(s) Oral every 6 hours PRN Mild Pain (1 - 3)      LABS:                        11.4   7.97  )-----------( 208      ( 2021 07:54 )             36.2     Hgb Trend: 11.4<--, 13.2<--, 11.7<--  04-02    135  |  96<L>  |  13  ----------------------------<  219<H>  3.9   |  32<H>  |  0.38<L>    Ca    9.3      2021 07:54  Phos  3.0     04-02  Mg     1.9     -    TPro  6.4  /  Alb  3.1<L>  /  TBili  0.3  /  DBili  x   /  AST  52<H>  /  ALT  55<H>  /  AlkPhos  202<H>  04-    Creatinine Trend: 0.38<--, 0.45<--, 0.51<--    Urinalysis Basic - ( 2021 15:58 )    Color: Light Yellow / Appearance: Turbid / S.015 / pH: x  Gluc: x / Ketone: Negative  / Bili: Negative / Urobili: <2 mg/dL   Blood: x / Protein: 100 mg/dL / Nitrite: Positive   Leuk Esterase: Large / RBC: 8 /HPF / WBC 20 /HPF   Sq Epi: x / Non Sq Epi: 15 /HPF / Bacteria: Moderate        Venous Blood Gas:   @ 19:32  7.37/52/70/28/86.0  VBG Lactate: 1.1      MICROBIOLOGY:     Culture - Blood (collected 2021 02:30)  Source: .Blood Blood-Peripheral  Preliminary Report (2021 03:01):    No growth to date.    Culture - Blood (collected 2021 02:30)  Source: .Blood Blood-Peripheral  Preliminary Report (2021 03:01):    No growth to date.        RADIOLOGY:  [ ] Reviewed and interpreted by me    PULMONARY FUNCTION TESTS:    EKG:

## 2021-04-02 NOTE — PROGRESS NOTE ADULT - SUBJECTIVE AND OBJECTIVE BOX
Patient is a 66y old  Female who presents with a chief complaint of Pneumonia (2021 10:11)      SUBJECTIVE / OVERNIGHT EVENTS:  Patient c/o unable to bring up airway secretions. Otherwise no new complaints. Denies cp, SOB, abdominal pain, N/V/D     MEDICATIONS  (STANDING):  ALBUTerol    90 MICROgram(s) HFA Inhaler 1 Puff(s) Inhalation every 6 hours  albuterol/ipratropium for Nebulization 3 milliLiter(s) Nebulizer every 6 hours  benzonatate 100 milliGRAM(s) Oral three times a day  buDESOnide    Inhalation Suspension 0.5 milliGRAM(s) Inhalation every 12 hours  calcium carbonate 1250 mG  + Vitamin D (OsCal 500 + D) 1 Tablet(s) Oral daily  cyanocobalamin Injectable 1000 MICROGram(s) IntraMuscular <User Schedule>  dextrose 40% Gel 15 Gram(s) Oral once  dextrose 5%. 1000 milliLiter(s) (50 mL/Hr) IV Continuous <Continuous>  dextrose 5%. 1000 milliLiter(s) (100 mL/Hr) IV Continuous <Continuous>  dextrose 50% Injectable 25 Gram(s) IV Push once  dextrose 50% Injectable 12.5 Gram(s) IV Push once  dextrose 50% Injectable 25 Gram(s) IV Push once  enoxaparin Injectable 40 milliGRAM(s) SubCutaneous daily  ferrous    sulfate 325 milliGRAM(s) Oral daily  fluticasone propionate 50 MICROgram(s)/spray Nasal Spray 1 Spray(s) Both Nostrils <User Schedule>  glucagon  Injectable 1 milliGRAM(s) IntraMuscular once  influenza   Vaccine 0.5 milliLiter(s) IntraMuscular once  insulin glargine Injectable (LANTUS) 20 Unit(s) SubCutaneous at bedtime  insulin lispro (ADMELOG) corrective regimen sliding scale   SubCutaneous three times a day before meals  insulin lispro (ADMELOG) corrective regimen sliding scale   SubCutaneous at bedtime  insulin lispro Injectable (ADMELOG) 2 Unit(s) SubCutaneous before breakfast  insulin lispro Injectable (ADMELOG) 2 Unit(s) SubCutaneous before lunch  insulin lispro Injectable (ADMELOG) 2 Unit(s) SubCutaneous before dinner  montelukast 10 milliGRAM(s) Oral daily  nystatin    Suspension 170460 Unit(s) Swish and Swallow four times a day  pantoprazole    Tablet 40 milliGRAM(s) Oral before breakfast  piperacillin/tazobactam IVPB.. 3.375 Gram(s) IV Intermittent every 8 hours  polyethylene glycol 3350 17 Gram(s) Oral daily  predniSONE   Tablet 20 milliGRAM(s) Oral two times a day  senna 2 Tablet(s) Oral at bedtime  tiotropium 18 MICROgram(s) Capsule 1 Capsule(s) Inhalation daily  tobramycin for Nebulization 300 milliGRAM(s) Inhalation every 12 hours  venlafaxine XR. 37.5 milliGRAM(s) Oral daily    MEDICATIONS  (PRN):  acetaminophen   Tablet .. 650 milliGRAM(s) Oral every 6 hours PRN Mild Pain (1 - 3)      Vital Signs Last 24 Hrs  T(C): 36.6 (2021 09:01), Max: 37 (2021 02:07)  T(F): 97.8 (2021 09:01), Max: 98.6 (2021 02:07)  HR: 80 (2021 10:56) (65 - 88)  BP: 131/79 (2021 09:01) (131/79 - 150/68)  BP(mean): --  RR: 16 (2021 09:01) (16 - 18)  SpO2: 92% (2021 10:56) (92% - 100%)  CAPILLARY BLOOD GLUCOSE      POCT Blood Glucose.: 169 mg/dL (2021 09:03)  POCT Blood Glucose.: 175 mg/dL (2021 22:03)  POCT Blood Glucose.: 355 mg/dL (2021 17:30)  POCT Blood Glucose.: 301 mg/dL (2021 12:13)    I&O's Summary    2021 07:01  -  2021 07:00  --------------------------------------------------------  IN: 540 mL / OUT: 0 mL / NET: 540 mL        PHYSICAL EXAM:  GENERAL: NAD, well-developed  HEAD:  Atraumatic, Normocephalic  EYES: EOMI, PERRLA, conjunctiva and sclera clear  NECK: Supple, No JVD  CHEST/LUNG: diffuse rhonchi and airway crackles B/L; No wheeze  HEART: Regular rate and rhythm; No murmurs, rubs, or gallops  ABDOMEN: Soft, Nontender, Nondistended; Bowel sounds present  EXTREMITIES:  2+ Peripheral Pulses, No clubbing, cyanosis, or edema  PSYCH: AAOx3  NEUROLOGY: non-focal  SKIN: No rashes or lesions    LABS:                        11.4   7.97  )-----------( 208      ( 2021 07:54 )             36.2     04-02    135  |  96<L>  |  13  ----------------------------<  219<H>  3.9   |  32<H>  |  0.38<L>    Ca    9.3      2021 07:54  Phos  3.0     04-02  Mg     1.9     04-    TPro  6.4  /  Alb  3.1<L>  /  TBili  0.3  /  DBili  x   /  AST  52<H>  /  ALT  55<H>  /  AlkPhos  202<H>  04-02          Urinalysis Basic - ( 2021 15:58 )    Color: Light Yellow / Appearance: Turbid / S.015 / pH: x  Gluc: x / Ketone: Negative  / Bili: Negative / Urobili: <2 mg/dL   Blood: x / Protein: 100 mg/dL / Nitrite: Positive   Leuk Esterase: Large / RBC: 8 /HPF / WBC 20 /HPF   Sq Epi: x / Non Sq Epi: 15 /HPF / Bacteria: Moderate        RADIOLOGY & ADDITIONAL TESTS:    Imaging Personally Reviewed:    Consultant(s) Notes Reviewed:      Care Discussed with Consultants/Other Providers:

## 2021-04-02 NOTE — PROGRESS NOTE ADULT - PROBLEM SELECTOR PLAN 1
Leukocytosis and tachypnea. Prior sputum cx's grew HIB and Pseudomonas. Alternates between inhaled tobra and azithromycin but pt having fevers, worsening dyspnea and cough w/ increased sputum production concerning for Pseudomonas pneumonia.  - Lactate initially 1.1,  Received Zosyn x2 in the ED.   - c/w Zosyn 3.375g q8h and  inhaled tobra  - BCx x2 NGTD.  F/U sputum cx  - c/w nebulized budesonide, PRN albuterol nebs, and inhaled hypertonic saline.   - Chest PT  - f/u pulm

## 2021-04-02 NOTE — PROGRESS NOTE ADULT - PROBLEM SELECTOR PLAN 3
Per pt, on Basaglar 10u qhs and Admelog 2u qAC at home --> increase lantus to 15  - Check HbA1c, family reports FSBGs been running high  - FS and low ISS TID qAC and qhs

## 2021-04-02 NOTE — PROGRESS NOTE ADULT - PROBLEM SELECTOR PLAN 2
Pt started taking prednisone 20mg BID on her own about two weeks ago.  - c/w prednisone 20mg PO BID for now, f/u pulm  dry mouth - trial Nystatin, encourage PO fluids

## 2021-04-02 NOTE — PROGRESS NOTE ADULT - ASSESSMENT
67 yo F PMH cystic bronchiectasis, PCD, ABPA, prior pseudomonas , stenotrophomonas pna, HTN, and T2DM presenting for subacute worsening shortness of breath and fevers c/f pna    - CT w/ new KELLY GGOs  - agree w/ empiric coverage w/ zosyn; sputum cx pending  - Bcx NGTD  - okay to continue BID prednisone 20mg  - c/w budesonide  - c/w fluticasone  - c/w montelukast  - would consider discontinuing tiotropium if maintaining on duonebs to avoid dual anticholinergic   - c/w standing albuterol and prn for now  - c/w inhaled tobramycin  - would avoid hypersal as caused pt discomfort in the past, please provide with aerobika for assistance with clearance; also recommend Chest PT as tolerated  - UA positive, would also send for urine culture    David Aldrich MD  PGY-4  PCCM Fellow  Pager [phone]

## 2021-04-02 NOTE — PROGRESS NOTE ADULT - ASSESSMENT
- Continue home albuterol and symbicort   -pat states improvement to SOB since pace maker was adjusted   66 y.o. 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

## 2021-04-03 LAB
ALBUMIN SERPL ELPH-MCNC: 3 G/DL — LOW (ref 3.3–5)
ALP SERPL-CCNC: 195 U/L — HIGH (ref 40–120)
ALT FLD-CCNC: 58 U/L — HIGH (ref 4–33)
ANION GAP SERPL CALC-SCNC: 5 MMOL/L — LOW (ref 7–14)
AST SERPL-CCNC: 46 U/L — HIGH (ref 4–32)
BILIRUB SERPL-MCNC: 0.2 MG/DL — SIGNIFICANT CHANGE UP (ref 0.2–1.2)
BUN SERPL-MCNC: 13 MG/DL — SIGNIFICANT CHANGE UP (ref 7–23)
CALCIUM SERPL-MCNC: 9.1 MG/DL — SIGNIFICANT CHANGE UP (ref 8.4–10.5)
CHLORIDE SERPL-SCNC: 95 MMOL/L — LOW (ref 98–107)
CO2 SERPL-SCNC: 33 MMOL/L — HIGH (ref 22–31)
CREAT SERPL-MCNC: 0.38 MG/DL — LOW (ref 0.5–1.3)
CULTURE RESULTS: SIGNIFICANT CHANGE UP
GLUCOSE BLDC GLUCOMTR-MCNC: 216 MG/DL — HIGH (ref 70–99)
GLUCOSE BLDC GLUCOMTR-MCNC: 243 MG/DL — HIGH (ref 70–99)
GLUCOSE BLDC GLUCOMTR-MCNC: 313 MG/DL — HIGH (ref 70–99)
GLUCOSE BLDC GLUCOMTR-MCNC: 395 MG/DL — HIGH (ref 70–99)
GLUCOSE SERPL-MCNC: 264 MG/DL — HIGH (ref 70–99)
HCT VFR BLD CALC: 34.4 % — LOW (ref 34.5–45)
HGB BLD-MCNC: 10.8 G/DL — LOW (ref 11.5–15.5)
MAGNESIUM SERPL-MCNC: 2 MG/DL — SIGNIFICANT CHANGE UP (ref 1.6–2.6)
MCHC RBC-ENTMCNC: 27.7 PG — SIGNIFICANT CHANGE UP (ref 27–34)
MCHC RBC-ENTMCNC: 31.4 GM/DL — LOW (ref 32–36)
MCV RBC AUTO: 88.2 FL — SIGNIFICANT CHANGE UP (ref 80–100)
NRBC # BLD: 0 /100 WBCS — SIGNIFICANT CHANGE UP
NRBC # FLD: 0 K/UL — SIGNIFICANT CHANGE UP
PHOSPHATE SERPL-MCNC: 2.5 MG/DL — SIGNIFICANT CHANGE UP (ref 2.5–4.5)
PLATELET # BLD AUTO: 199 K/UL — SIGNIFICANT CHANGE UP (ref 150–400)
POTASSIUM SERPL-MCNC: 4.1 MMOL/L — SIGNIFICANT CHANGE UP (ref 3.5–5.3)
POTASSIUM SERPL-SCNC: 4.1 MMOL/L — SIGNIFICANT CHANGE UP (ref 3.5–5.3)
PROT SERPL-MCNC: 6.4 G/DL — SIGNIFICANT CHANGE UP (ref 6–8.3)
RBC # BLD: 3.9 M/UL — SIGNIFICANT CHANGE UP (ref 3.8–5.2)
RBC # FLD: 12.8 % — SIGNIFICANT CHANGE UP (ref 10.3–14.5)
SODIUM SERPL-SCNC: 133 MMOL/L — LOW (ref 135–145)
SPECIMEN SOURCE: SIGNIFICANT CHANGE UP
WBC # BLD: 7.1 K/UL — SIGNIFICANT CHANGE UP (ref 3.8–10.5)
WBC # FLD AUTO: 7.1 K/UL — SIGNIFICANT CHANGE UP (ref 3.8–10.5)

## 2021-04-03 PROCEDURE — 99233 SBSQ HOSP IP/OBS HIGH 50: CPT

## 2021-04-03 RX ORDER — INSULIN GLARGINE 100 [IU]/ML
22 INJECTION, SOLUTION SUBCUTANEOUS AT BEDTIME
Refills: 0 | Status: DISCONTINUED | OUTPATIENT
Start: 2021-04-03 | End: 2021-04-06

## 2021-04-03 RX ORDER — IPRATROPIUM/ALBUTEROL SULFATE 18-103MCG
3 AEROSOL WITH ADAPTER (GRAM) INHALATION EVERY 12 HOURS
Refills: 0 | Status: DISCONTINUED | OUTPATIENT
Start: 2021-04-03 | End: 2021-04-03

## 2021-04-03 RX ORDER — INSULIN LISPRO 100/ML
5 VIAL (ML) SUBCUTANEOUS
Refills: 0 | Status: DISCONTINUED | OUTPATIENT
Start: 2021-04-03 | End: 2021-04-06

## 2021-04-03 RX ORDER — IPRATROPIUM/ALBUTEROL SULFATE 18-103MCG
3 AEROSOL WITH ADAPTER (GRAM) INHALATION EVERY 12 HOURS
Refills: 0 | Status: DISCONTINUED | OUTPATIENT
Start: 2021-04-03 | End: 2021-04-06

## 2021-04-03 RX ADMIN — SENNA PLUS 2 TABLET(S): 8.6 TABLET ORAL at 21:40

## 2021-04-03 RX ADMIN — Medication 0.5 MILLIGRAM(S): at 22:00

## 2021-04-03 RX ADMIN — Medication 4: at 17:59

## 2021-04-03 RX ADMIN — Medication 325 MILLIGRAM(S): at 13:10

## 2021-04-03 RX ADMIN — Medication 1 TABLET(S): at 13:09

## 2021-04-03 RX ADMIN — MONTELUKAST 10 MILLIGRAM(S): 4 TABLET, CHEWABLE ORAL at 13:09

## 2021-04-03 RX ADMIN — Medication 500000 UNIT(S): at 05:12

## 2021-04-03 RX ADMIN — Medication 5: at 13:06

## 2021-04-03 RX ADMIN — Medication 3 MILLILITER(S): at 03:55

## 2021-04-03 RX ADMIN — Medication 3 MILLILITER(S): at 08:34

## 2021-04-03 RX ADMIN — Medication 2: at 08:54

## 2021-04-03 RX ADMIN — POLYETHYLENE GLYCOL 3350 17 GRAM(S): 17 POWDER, FOR SOLUTION ORAL at 13:10

## 2021-04-03 RX ADMIN — PIPERACILLIN AND TAZOBACTAM 25 GRAM(S): 4; .5 INJECTION, POWDER, LYOPHILIZED, FOR SOLUTION INTRAVENOUS at 18:01

## 2021-04-03 RX ADMIN — PANTOPRAZOLE SODIUM 40 MILLIGRAM(S): 20 TABLET, DELAYED RELEASE ORAL at 05:12

## 2021-04-03 RX ADMIN — Medication 500000 UNIT(S): at 21:40

## 2021-04-03 RX ADMIN — Medication 0.5 MILLIGRAM(S): at 08:37

## 2021-04-03 RX ADMIN — Medication 300 MILLIGRAM(S): at 22:00

## 2021-04-03 RX ADMIN — PIPERACILLIN AND TAZOBACTAM 25 GRAM(S): 4; .5 INJECTION, POWDER, LYOPHILIZED, FOR SOLUTION INTRAVENOUS at 00:54

## 2021-04-03 RX ADMIN — INSULIN GLARGINE 22 UNIT(S): 100 INJECTION, SOLUTION SUBCUTANEOUS at 21:40

## 2021-04-03 RX ADMIN — Medication 1 SPRAY(S): at 13:07

## 2021-04-03 RX ADMIN — Medication 37.5 MILLIGRAM(S): at 13:09

## 2021-04-03 RX ADMIN — Medication 5 UNIT(S): at 13:05

## 2021-04-03 RX ADMIN — Medication 100 MILLIGRAM(S): at 21:41

## 2021-04-03 RX ADMIN — Medication 5 UNIT(S): at 17:59

## 2021-04-03 RX ADMIN — Medication 300 MILLIGRAM(S): at 08:37

## 2021-04-03 RX ADMIN — Medication 20 MILLIGRAM(S): at 10:08

## 2021-04-03 RX ADMIN — Medication 500000 UNIT(S): at 18:42

## 2021-04-03 RX ADMIN — Medication 100 MILLIGRAM(S): at 05:12

## 2021-04-03 RX ADMIN — Medication 500000 UNIT(S): at 10:08

## 2021-04-03 RX ADMIN — Medication 20 MILLIGRAM(S): at 21:40

## 2021-04-03 RX ADMIN — PIPERACILLIN AND TAZOBACTAM 25 GRAM(S): 4; .5 INJECTION, POWDER, LYOPHILIZED, FOR SOLUTION INTRAVENOUS at 10:08

## 2021-04-03 RX ADMIN — Medication 100 MILLIGRAM(S): at 13:11

## 2021-04-03 RX ADMIN — Medication 5 UNIT(S): at 08:53

## 2021-04-03 RX ADMIN — Medication 3 MILLILITER(S): at 21:59

## 2021-04-03 RX ADMIN — ENOXAPARIN SODIUM 40 MILLIGRAM(S): 100 INJECTION SUBCUTANEOUS at 13:07

## 2021-04-03 NOTE — PROGRESS NOTE ADULT - PROBLEM SELECTOR PLAN 3
Per pt, on Basaglar 10u qhs and Admelog 2u qAC at home --> increase lantus to 15  - Check HbA1c, family reports FSBGs been running high  - FS and low ISS TID qAC and qhs Per pt, on Basaglar 10u qhs and Admelog 2u qAC at home -->   -FS still elevated   -increase lantus to 20  , family reports FSBGs been running high  - FS and low ISS TID qAC and qhs Per pt, on Basaglar 10u qhs and Admelog 2u qAC at home -->   -FS still elevated   -increase lantus to 22 units qHs and c/w premeal 5 Units qAC  - family reports FSBGs been running high

## 2021-04-03 NOTE — PROGRESS NOTE ADULT - PROBLEM SELECTOR PLAN 1
Leukocytosis and tachypnea. Prior sputum cx's grew HIB and Pseudomonas. Alternates between inhaled tobra and azithromycin but pt having fevers, worsening dyspnea and cough w/ increased sputum production concerning for Pseudomonas pneumonia.  - Lactate initially 1.1,  Received Zosyn x2 in the ED.   - c/w Zosyn 3.375g q8h and  inhaled tobra  - BCx x2 NGTD.  F/U sputum cx  - c/w nebulized budesonide, PRN albuterol nebs, and inhaled hypertonic saline.   - Chest PT  - f/u pulm Leukocytosis and tachypnea. Prior sputum cx's grew HIB and Pseudomonas. Alternates between inhaled tobra and azithromycin but pt having fevers, worsening dyspnea and cough w/ increased sputum production concerning for Pseudomonas pneumonia.  - Lactate initially 1.1,  Received Zosyn x2 in the ED.   - c/w Zosyn 3.375g q8h and  inhaled tobra  - BCx x2 NGTD.  F/U sputum cx  - c/w nebulized budesonide, PRN albuterol nebs, and inhaled hypertonic saline.   - Chest PT  - f/u pulm recs

## 2021-04-03 NOTE — PROGRESS NOTE ADULT - ASSESSMENT
66 y.o. 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

## 2021-04-03 NOTE — PROGRESS NOTE ADULT - SUBJECTIVE AND OBJECTIVE BOX
Patient is a 66y old  Female who presents with a chief complaint of Pneumonia (2021 11:51)      SUBJECTIVE / OVERNIGHT EVENTS:    MEDICATIONS  (STANDING):  ALBUTerol    90 MICROgram(s) HFA Inhaler 1 Puff(s) Inhalation every 6 hours  albuterol/ipratropium for Nebulization 3 milliLiter(s) Nebulizer every 6 hours  benzonatate 100 milliGRAM(s) Oral three times a day  buDESOnide    Inhalation Suspension 0.5 milliGRAM(s) Inhalation every 12 hours  calcium carbonate 1250 mG  + Vitamin D (OsCal 500 + D) 1 Tablet(s) Oral daily  cyanocobalamin Injectable 1000 MICROGram(s) IntraMuscular <User Schedule>  dextrose 40% Gel 15 Gram(s) Oral once  dextrose 5%. 1000 milliLiter(s) (50 mL/Hr) IV Continuous <Continuous>  dextrose 5%. 1000 milliLiter(s) (100 mL/Hr) IV Continuous <Continuous>  dextrose 50% Injectable 25 Gram(s) IV Push once  dextrose 50% Injectable 12.5 Gram(s) IV Push once  dextrose 50% Injectable 25 Gram(s) IV Push once  enoxaparin Injectable 40 milliGRAM(s) SubCutaneous daily  ferrous    sulfate 325 milliGRAM(s) Oral daily  fluticasone propionate 50 MICROgram(s)/spray Nasal Spray 1 Spray(s) Both Nostrils <User Schedule>  glucagon  Injectable 1 milliGRAM(s) IntraMuscular once  influenza   Vaccine 0.5 milliLiter(s) IntraMuscular once  insulin glargine Injectable (LANTUS) 20 Unit(s) SubCutaneous at bedtime  insulin lispro (ADMELOG) corrective regimen sliding scale   SubCutaneous three times a day before meals  insulin lispro (ADMELOG) corrective regimen sliding scale   SubCutaneous at bedtime  insulin lispro Injectable (ADMELOG) 5 Unit(s) SubCutaneous three times a day before meals  montelukast 10 milliGRAM(s) Oral daily  nystatin    Suspension [id number] Unit(s) Swish and Swallow four times a day  pantoprazole    Tablet 40 milliGRAM(s) Oral before breakfast  piperacillin/tazobactam IVPB.. 3.375 Gram(s) IV Intermittent every 8 hours  polyethylene glycol 3350 17 Gram(s) Oral daily  predniSONE   Tablet 20 milliGRAM(s) Oral two times a day  senna 2 Tablet(s) Oral at bedtime  tiotropium 18 MICROgram(s) Capsule 1 Capsule(s) Inhalation daily  tobramycin for Nebulization 300 milliGRAM(s) Inhalation every 12 hours  venlafaxine XR. 37.5 milliGRAM(s) Oral daily    MEDICATIONS  (PRN):  acetaminophen   Tablet .. 650 milliGRAM(s) Oral every 6 hours PRN Mild Pain (1 - 3)      Vital Signs Last 24 Hrs  T(C): 36.5 (2021 06:29), Max: 36.9 (2021 18:39)  T(F): 97.7 (2021 06:29), Max: 98.5 (2021 18:39)  HR: 63 (2021 06:29) (63 - 82)  BP: 151/81 (2021 06:29) (132/69 - 151/81)  BP(mean): --  RR: 18 (2021 06:29) (16 - 18)  SpO2: 97% (2021 06:29) (92% - 99%)  CAPILLARY BLOOD GLUCOSE      POCT Blood Glucose.: 216 mg/dL (2021 08:41)  POCT Blood Glucose.: 395 mg/dL (2021 21:28)  POCT Blood Glucose.: 273 mg/dL (2021 17:55)  POCT Blood Glucose.: 339 mg/dL (2021 12:57)    I&O's Summary    2021 07:01  -  2021 07:00  --------------------------------------------------------  IN: 360 mL / OUT: 0 mL / NET: 360 mL        PHYSICAL EXAM:  GENERAL: NAD, well-developed  HEAD:  Atraumatic, Normocephalic  EYES: EOMI, PERRLA, conjunctiva and sclera clear  NECK: Supple, No JVD  CHEST/LUNG: Clear to auscultation bilaterally; No wheeze  HEART: Regular rate and rhythm; No murmurs, rubs, or gallops  ABDOMEN: Soft, Nontender, Nondistended; Bowel sounds present  EXTREMITIES:  2+ Peripheral Pulses, No clubbing, cyanosis, or edema  PSYCH: AAOx3  NEUROLOGY: non-focal  SKIN: No rashes or lesions    LABS:                        10.8   7.10  )-----------( 199      ( 2021 05:27 )             34.4     04-03    133<L>  |  95<L>  |  13  ----------------------------<  264<H>  4.1   |  33<H>  |  0.38<L>    Ca    9.1      2021 05:27  Phos  2.5     04-03  Mg     2.0     04-03    TPro  6.4  /  Alb  3.0<L>  /  TBili  0.2  /  DBili  x   /  AST  46<H>  /  ALT  58<H>  /  AlkPhos  195<H>  04-03          Urinalysis Basic - ( 2021 15:58 )    Color: Light Yellow / Appearance: Turbid / S.015 / pH: x  Gluc: x / Ketone: Negative  / Bili: Negative / Urobili: <2 mg/dL   Blood: x / Protein: 100 mg/dL / Nitrite: Positive   Leuk Esterase: Large / RBC: 8 /HPF / WBC 20 /HPF   Sq Epi: x / Non Sq Epi: 15 /HPF / Bacteria: Moderate        RADIOLOGY & ADDITIONAL TESTS:    Imaging Personally Reviewed:    Consultant(s) Notes Reviewed:      Care Discussed with Consultants/Other Providers:   Patient is a 66y old  Female who presents with a chief complaint of Pneumonia (2021 11:51)      SUBJECTIVE / OVERNIGHT EVENTS:  Patient has no new complaints. Denies cp, SOB, abdominal pain, N/V/D     MEDICATIONS  (STANDING):  ALBUTerol    90 MICROgram(s) HFA Inhaler 1 Puff(s) Inhalation every 6 hours  albuterol/ipratropium for Nebulization 3 milliLiter(s) Nebulizer every 6 hours  benzonatate 100 milliGRAM(s) Oral three times a day  buDESOnide    Inhalation Suspension 0.5 milliGRAM(s) Inhalation every 12 hours  calcium carbonate 1250 mG  + Vitamin D (OsCal 500 + D) 1 Tablet(s) Oral daily  cyanocobalamin Injectable 1000 MICROGram(s) IntraMuscular <User Schedule>  dextrose 40% Gel 15 Gram(s) Oral once  dextrose 5%. 1000 milliLiter(s) (50 mL/Hr) IV Continuous <Continuous>  dextrose 5%. 1000 milliLiter(s) (100 mL/Hr) IV Continuous <Continuous>  dextrose 50% Injectable 25 Gram(s) IV Push once  dextrose 50% Injectable 12.5 Gram(s) IV Push once  dextrose 50% Injectable 25 Gram(s) IV Push once  enoxaparin Injectable 40 milliGRAM(s) SubCutaneous daily  ferrous    sulfate 325 milliGRAM(s) Oral daily  fluticasone propionate 50 MICROgram(s)/spray Nasal Spray 1 Spray(s) Both Nostrils <User Schedule>  glucagon  Injectable 1 milliGRAM(s) IntraMuscular once  influenza   Vaccine 0.5 milliLiter(s) IntraMuscular once  insulin glargine Injectable (LANTUS) 20 Unit(s) SubCutaneous at bedtime  insulin lispro (ADMELOG) corrective regimen sliding scale   SubCutaneous three times a day before meals  insulin lispro (ADMELOG) corrective regimen sliding scale   SubCutaneous at bedtime  insulin lispro Injectable (ADMELOG) 5 Unit(s) SubCutaneous three times a day before meals  montelukast 10 milliGRAM(s) Oral daily  nystatin    Suspension 130349 Unit(s) Swish and Swallow four times a day  pantoprazole    Tablet 40 milliGRAM(s) Oral before breakfast  piperacillin/tazobactam IVPB.. 3.375 Gram(s) IV Intermittent every 8 hours  polyethylene glycol 3350 17 Gram(s) Oral daily  predniSONE   Tablet 20 milliGRAM(s) Oral two times a day  senna 2 Tablet(s) Oral at bedtime  tiotropium 18 MICROgram(s) Capsule 1 Capsule(s) Inhalation daily  tobramycin for Nebulization 300 milliGRAM(s) Inhalation every 12 hours  venlafaxine XR. 37.5 milliGRAM(s) Oral daily    MEDICATIONS  (PRN):  acetaminophen   Tablet .. 650 milliGRAM(s) Oral every 6 hours PRN Mild Pain (1 - 3)      Vital Signs Last 24 Hrs  T(C): 36.5 (2021 06:29), Max: 36.9 (2021 18:39)  T(F): 97.7 (2021 06:29), Max: 98.5 (2021 18:39)  HR: 63 (2021 06:29) (63 - 82)  BP: 151/81 (2021 06:29) (132/69 - 151/81)  BP(mean): --  RR: 18 (2021 06:29) (16 - 18)  SpO2: 97% (2021 06:29) (92% - 99%)  CAPILLARY BLOOD GLUCOSE      POCT Blood Glucose.: 216 mg/dL (2021 08:41)  POCT Blood Glucose.: 395 mg/dL (2021 21:28)  POCT Blood Glucose.: 273 mg/dL (2021 17:55)  POCT Blood Glucose.: 339 mg/dL (2021 12:57)    I&O's Summary    2021 07:01  -  2021 07:00  --------------------------------------------------------  IN: 360 mL / OUT: 0 mL / NET: 360 mL        PHYSICAL EXAM:  GENERAL: NAD, well-developed  HEAD:  Atraumatic, Normocephalic  EYES: EOMI, PERRLA, conjunctiva and sclera clear  NECK: Supple, No JVD  CHEST/LUNG: bibasilar crackles; No wheeze  HEART: Regular rate and rhythm; No murmurs, rubs, or gallops  ABDOMEN: Soft, Nontender, Nondistended; Bowel sounds present  EXTREMITIES:  2+ Peripheral Pulses, No clubbing, cyanosis, or edema  PSYCH: AAOx3  NEUROLOGY: non-focal  SKIN: No rashes or lesions    LABS:                        10.8   7.10  )-----------( 199      ( 2021 05:27 )             34.4     04-03    133<L>  |  95<L>  |  13  ----------------------------<  264<H>  4.1   |  33<H>  |  0.38<L>    Ca    9.1      2021 05:27  Phos  2.5     04-03  Mg     2.0     04-03    TPro  6.4  /  Alb  3.0<L>  /  TBili  0.2  /  DBili  x   /  AST  46<H>  /  ALT  58<H>  /  AlkPhos  195<H>  04-03          Urinalysis Basic - ( 2021 15:58 )    Color: Light Yellow / Appearance: Turbid / S.015 / pH: x  Gluc: x / Ketone: Negative  / Bili: Negative / Urobili: <2 mg/dL   Blood: x / Protein: 100 mg/dL / Nitrite: Positive   Leuk Esterase: Large / RBC: 8 /HPF / WBC 20 /HPF   Sq Epi: x / Non Sq Epi: 15 /HPF / Bacteria: Moderate        RADIOLOGY & ADDITIONAL TESTS:    Imaging Personally Reviewed:    Consultant(s) Notes Reviewed:      Care Discussed with Consultants/Other Providers:

## 2021-04-04 LAB
ALBUMIN SERPL ELPH-MCNC: 2.9 G/DL — LOW (ref 3.3–5)
ALP SERPL-CCNC: 181 U/L — HIGH (ref 40–120)
ALT FLD-CCNC: 60 U/L — HIGH (ref 4–33)
ANION GAP SERPL CALC-SCNC: 4 MMOL/L — LOW (ref 7–14)
AST SERPL-CCNC: 37 U/L — HIGH (ref 4–32)
BILIRUB SERPL-MCNC: 0.2 MG/DL — SIGNIFICANT CHANGE UP (ref 0.2–1.2)
BUN SERPL-MCNC: 12 MG/DL — SIGNIFICANT CHANGE UP (ref 7–23)
CALCIUM SERPL-MCNC: 9.4 MG/DL — SIGNIFICANT CHANGE UP (ref 8.4–10.5)
CHLORIDE SERPL-SCNC: 94 MMOL/L — LOW (ref 98–107)
CO2 SERPL-SCNC: 36 MMOL/L — HIGH (ref 22–31)
CREAT SERPL-MCNC: 0.45 MG/DL — LOW (ref 0.5–1.3)
GLUCOSE BLDC GLUCOMTR-MCNC: 177 MG/DL — HIGH (ref 70–99)
GLUCOSE BLDC GLUCOMTR-MCNC: 182 MG/DL — HIGH (ref 70–99)
GLUCOSE BLDC GLUCOMTR-MCNC: 322 MG/DL — HIGH (ref 70–99)
GLUCOSE BLDC GLUCOMTR-MCNC: 393 MG/DL — HIGH (ref 70–99)
GLUCOSE BLDC GLUCOMTR-MCNC: 407 MG/DL — HIGH (ref 70–99)
GLUCOSE SERPL-MCNC: 263 MG/DL — HIGH (ref 70–99)
GRAM STN FLD: SIGNIFICANT CHANGE UP
HCT VFR BLD CALC: 35.9 % — SIGNIFICANT CHANGE UP (ref 34.5–45)
HGB BLD-MCNC: 11.1 G/DL — LOW (ref 11.5–15.5)
MCHC RBC-ENTMCNC: 27.5 PG — SIGNIFICANT CHANGE UP (ref 27–34)
MCHC RBC-ENTMCNC: 30.9 GM/DL — LOW (ref 32–36)
MCV RBC AUTO: 88.9 FL — SIGNIFICANT CHANGE UP (ref 80–100)
NRBC # BLD: 0 /100 WBCS — SIGNIFICANT CHANGE UP
NRBC # FLD: 0 K/UL — SIGNIFICANT CHANGE UP
PLATELET # BLD AUTO: 202 K/UL — SIGNIFICANT CHANGE UP (ref 150–400)
POTASSIUM SERPL-MCNC: 4.4 MMOL/L — SIGNIFICANT CHANGE UP (ref 3.5–5.3)
POTASSIUM SERPL-SCNC: 4.4 MMOL/L — SIGNIFICANT CHANGE UP (ref 3.5–5.3)
PROT SERPL-MCNC: 6.1 G/DL — SIGNIFICANT CHANGE UP (ref 6–8.3)
RBC # BLD: 4.04 M/UL — SIGNIFICANT CHANGE UP (ref 3.8–5.2)
RBC # FLD: 12.6 % — SIGNIFICANT CHANGE UP (ref 10.3–14.5)
SODIUM SERPL-SCNC: 134 MMOL/L — LOW (ref 135–145)
SPECIMEN SOURCE: SIGNIFICANT CHANGE UP
WBC # BLD: 7.1 K/UL — SIGNIFICANT CHANGE UP (ref 3.8–10.5)
WBC # FLD AUTO: 7.1 K/UL — SIGNIFICANT CHANGE UP (ref 3.8–10.5)

## 2021-04-04 PROCEDURE — 99233 SBSQ HOSP IP/OBS HIGH 50: CPT

## 2021-04-04 RX ADMIN — PANTOPRAZOLE SODIUM 40 MILLIGRAM(S): 20 TABLET, DELAYED RELEASE ORAL at 05:33

## 2021-04-04 RX ADMIN — Medication 20 MILLIGRAM(S): at 10:30

## 2021-04-04 RX ADMIN — Medication 325 MILLIGRAM(S): at 12:21

## 2021-04-04 RX ADMIN — Medication 1: at 09:24

## 2021-04-04 RX ADMIN — Medication 300 MILLIGRAM(S): at 21:37

## 2021-04-04 RX ADMIN — Medication 0.5 MILLIGRAM(S): at 21:40

## 2021-04-04 RX ADMIN — Medication 5 UNIT(S): at 09:24

## 2021-04-04 RX ADMIN — Medication 3 MILLILITER(S): at 21:35

## 2021-04-04 RX ADMIN — Medication 1: at 12:21

## 2021-04-04 RX ADMIN — INSULIN GLARGINE 22 UNIT(S): 100 INJECTION, SOLUTION SUBCUTANEOUS at 22:11

## 2021-04-04 RX ADMIN — Medication 500000 UNIT(S): at 21:16

## 2021-04-04 RX ADMIN — Medication 2: at 22:11

## 2021-04-04 RX ADMIN — Medication 100 MILLIGRAM(S): at 05:33

## 2021-04-04 RX ADMIN — Medication 500000 UNIT(S): at 05:33

## 2021-04-04 RX ADMIN — PIPERACILLIN AND TAZOBACTAM 25 GRAM(S): 4; .5 INJECTION, POWDER, LYOPHILIZED, FOR SOLUTION INTRAVENOUS at 10:30

## 2021-04-04 RX ADMIN — Medication 5 UNIT(S): at 12:20

## 2021-04-04 RX ADMIN — Medication 500000 UNIT(S): at 10:30

## 2021-04-04 RX ADMIN — Medication 1 SPRAY(S): at 12:23

## 2021-04-04 RX ADMIN — Medication 100 MILLIGRAM(S): at 17:33

## 2021-04-04 RX ADMIN — Medication 5 UNIT(S): at 17:32

## 2021-04-04 RX ADMIN — ENOXAPARIN SODIUM 40 MILLIGRAM(S): 100 INJECTION SUBCUTANEOUS at 12:21

## 2021-04-04 RX ADMIN — Medication 37.5 MILLIGRAM(S): at 12:22

## 2021-04-04 RX ADMIN — Medication 300 MILLIGRAM(S): at 08:22

## 2021-04-04 RX ADMIN — SENNA PLUS 2 TABLET(S): 8.6 TABLET ORAL at 21:16

## 2021-04-04 RX ADMIN — Medication 100 MILLIGRAM(S): at 21:16

## 2021-04-04 RX ADMIN — Medication 5: at 17:32

## 2021-04-04 RX ADMIN — POLYETHYLENE GLYCOL 3350 17 GRAM(S): 17 POWDER, FOR SOLUTION ORAL at 12:22

## 2021-04-04 RX ADMIN — Medication 500000 UNIT(S): at 17:33

## 2021-04-04 RX ADMIN — Medication 3 MILLILITER(S): at 08:16

## 2021-04-04 RX ADMIN — PIPERACILLIN AND TAZOBACTAM 25 GRAM(S): 4; .5 INJECTION, POWDER, LYOPHILIZED, FOR SOLUTION INTRAVENOUS at 03:20

## 2021-04-04 RX ADMIN — MONTELUKAST 10 MILLIGRAM(S): 4 TABLET, CHEWABLE ORAL at 10:31

## 2021-04-04 RX ADMIN — Medication 0.5 MILLIGRAM(S): at 08:21

## 2021-04-04 RX ADMIN — PIPERACILLIN AND TAZOBACTAM 25 GRAM(S): 4; .5 INJECTION, POWDER, LYOPHILIZED, FOR SOLUTION INTRAVENOUS at 17:33

## 2021-04-04 RX ADMIN — Medication 1 TABLET(S): at 12:23

## 2021-04-04 RX ADMIN — Medication 20 MILLIGRAM(S): at 21:16

## 2021-04-04 NOTE — PROGRESS NOTE ADULT - SUBJECTIVE AND OBJECTIVE BOX
Patient is a 66y old  Female who presents with a chief complaint of Pneumonia (03 Apr 2021 10:21)      SUBJECTIVE / OVERNIGHT EVENTS:    MEDICATIONS  (STANDING):  ALBUTerol    90 MICROgram(s) HFA Inhaler 1 Puff(s) Inhalation every 6 hours  albuterol/ipratropium for Nebulization 3 milliLiter(s) Nebulizer every 12 hours  benzonatate 100 milliGRAM(s) Oral three times a day  buDESOnide    Inhalation Suspension 0.5 milliGRAM(s) Inhalation every 12 hours  calcium carbonate 1250 mG  + Vitamin D (OsCal 500 + D) 1 Tablet(s) Oral daily  cyanocobalamin Injectable 1000 MICROGram(s) IntraMuscular <User Schedule>  dextrose 40% Gel 15 Gram(s) Oral once  dextrose 5%. 1000 milliLiter(s) (50 mL/Hr) IV Continuous <Continuous>  dextrose 5%. 1000 milliLiter(s) (100 mL/Hr) IV Continuous <Continuous>  dextrose 50% Injectable 25 Gram(s) IV Push once  dextrose 50% Injectable 12.5 Gram(s) IV Push once  dextrose 50% Injectable 25 Gram(s) IV Push once  enoxaparin Injectable 40 milliGRAM(s) SubCutaneous daily  ferrous    sulfate 325 milliGRAM(s) Oral daily  fluticasone propionate 50 MICROgram(s)/spray Nasal Spray 1 Spray(s) Both Nostrils <User Schedule>  glucagon  Injectable 1 milliGRAM(s) IntraMuscular once  influenza   Vaccine 0.5 milliLiter(s) IntraMuscular once  insulin glargine Injectable (LANTUS) 22 Unit(s) SubCutaneous at bedtime  insulin lispro (ADMELOG) corrective regimen sliding scale   SubCutaneous at bedtime  insulin lispro (ADMELOG) corrective regimen sliding scale   SubCutaneous three times a day before meals  insulin lispro Injectable (ADMELOG) 5 Unit(s) SubCutaneous three times a day before meals  montelukast 10 milliGRAM(s) Oral daily  nystatin    Suspension 331379 Unit(s) Swish and Swallow four times a day  pantoprazole    Tablet 40 milliGRAM(s) Oral before breakfast  piperacillin/tazobactam IVPB.. 3.375 Gram(s) IV Intermittent every 8 hours  polyethylene glycol 3350 17 Gram(s) Oral daily  predniSONE   Tablet 20 milliGRAM(s) Oral two times a day  senna 2 Tablet(s) Oral at bedtime  tiotropium 18 MICROgram(s) Capsule 1 Capsule(s) Inhalation daily  tobramycin for Nebulization 300 milliGRAM(s) Inhalation every 12 hours  venlafaxine XR. 37.5 milliGRAM(s) Oral daily    MEDICATIONS  (PRN):  acetaminophen   Tablet .. 650 milliGRAM(s) Oral every 6 hours PRN Mild Pain (1 - 3)      Vital Signs Last 24 Hrs  T(C): 36.4 (04 Apr 2021 06:00), Max: 36.8 (03 Apr 2021 14:45)  T(F): 97.6 (04 Apr 2021 06:00), Max: 98.2 (03 Apr 2021 14:45)  HR: 66 (04 Apr 2021 06:00) (62 - 73)  BP: 155/70 (04 Apr 2021 06:00) (127/72 - 155/70)  BP(mean): --  RR: 17 (04 Apr 2021 06:00) (16 - 18)  SpO2: 97% (04 Apr 2021 06:00) (97% - 100%)  CAPILLARY BLOOD GLUCOSE      POCT Blood Glucose.: 177 mg/dL (04 Apr 2021 09:10)  POCT Blood Glucose.: 243 mg/dL (03 Apr 2021 21:31)  POCT Blood Glucose.: 313 mg/dL (03 Apr 2021 17:57)  POCT Blood Glucose.: 395 mg/dL (03 Apr 2021 12:16)    I&O's Summary    03 Apr 2021 07:01  -  04 Apr 2021 07:00  --------------------------------------------------------  IN: 920 mL / OUT: 950 mL / NET: -30 mL        PHYSICAL EXAM:  GENERAL: NAD, well-developed  HEAD:  Atraumatic, Normocephalic  EYES: EOMI, PERRLA, conjunctiva and sclera clear  NECK: Supple, No JVD  CHEST/LUNG: Clear to auscultation bilaterally; No wheeze  HEART: Regular rate and rhythm; No murmurs, rubs, or gallops  ABDOMEN: Soft, Nontender, Nondistended; Bowel sounds present  EXTREMITIES:  2+ Peripheral Pulses, No clubbing, cyanosis, or edema  PSYCH: AAOx3  NEUROLOGY: non-focal  SKIN: No rashes or lesions    LABS:                        11.1   7.10  )-----------( 202      ( 04 Apr 2021 07:24 )             35.9     04-04    134<L>  |  94<L>  |  12  ----------------------------<  263<H>  4.4   |  36<H>  |  0.45<L>    Ca    9.4      04 Apr 2021 07:24  Phos  2.5     04-03  Mg     2.0     04-03    TPro  6.1  /  Alb  2.9<L>  /  TBili  0.2  /  DBili  x   /  AST  37<H>  /  ALT  60<H>  /  AlkPhos  181<H>  04-04              RADIOLOGY & ADDITIONAL TESTS:    Imaging Personally Reviewed:    Consultant(s) Notes Reviewed:      Care Discussed with Consultants/Other Providers:   Patient is a 66y old  Female who presents with a chief complaint of Pneumonia (03 Apr 2021 10:21)      SUBJECTIVE / OVERNIGHT EVENTS:  Patient has no new complaints. Still having difficulty with airway congestion. Denies cp, SOB, abdominal pain, N/V/D     MEDICATIONS  (STANDING):  ALBUTerol    90 MICROgram(s) HFA Inhaler 1 Puff(s) Inhalation every 6 hours  albuterol/ipratropium for Nebulization 3 milliLiter(s) Nebulizer every 12 hours  benzonatate 100 milliGRAM(s) Oral three times a day  buDESOnide    Inhalation Suspension 0.5 milliGRAM(s) Inhalation every 12 hours  calcium carbonate 1250 mG  + Vitamin D (OsCal 500 + D) 1 Tablet(s) Oral daily  cyanocobalamin Injectable 1000 MICROGram(s) IntraMuscular <User Schedule>  dextrose 40% Gel 15 Gram(s) Oral once  dextrose 5%. 1000 milliLiter(s) (50 mL/Hr) IV Continuous <Continuous>  dextrose 5%. 1000 milliLiter(s) (100 mL/Hr) IV Continuous <Continuous>  dextrose 50% Injectable 25 Gram(s) IV Push once  dextrose 50% Injectable 12.5 Gram(s) IV Push once  dextrose 50% Injectable 25 Gram(s) IV Push once  enoxaparin Injectable 40 milliGRAM(s) SubCutaneous daily  ferrous    sulfate 325 milliGRAM(s) Oral daily  fluticasone propionate 50 MICROgram(s)/spray Nasal Spray 1 Spray(s) Both Nostrils <User Schedule>  glucagon  Injectable 1 milliGRAM(s) IntraMuscular once  influenza   Vaccine 0.5 milliLiter(s) IntraMuscular once  insulin glargine Injectable (LANTUS) 22 Unit(s) SubCutaneous at bedtime  insulin lispro (ADMELOG) corrective regimen sliding scale   SubCutaneous at bedtime  insulin lispro (ADMELOG) corrective regimen sliding scale   SubCutaneous three times a day before meals  insulin lispro Injectable (ADMELOG) 5 Unit(s) SubCutaneous three times a day before meals  montelukast 10 milliGRAM(s) Oral daily  nystatin    Suspension 957167 Unit(s) Swish and Swallow four times a day  pantoprazole    Tablet 40 milliGRAM(s) Oral before breakfast  piperacillin/tazobactam IVPB.. 3.375 Gram(s) IV Intermittent every 8 hours  polyethylene glycol 3350 17 Gram(s) Oral daily  predniSONE   Tablet 20 milliGRAM(s) Oral two times a day  senna 2 Tablet(s) Oral at bedtime  tiotropium 18 MICROgram(s) Capsule 1 Capsule(s) Inhalation daily  tobramycin for Nebulization 300 milliGRAM(s) Inhalation every 12 hours  venlafaxine XR. 37.5 milliGRAM(s) Oral daily    MEDICATIONS  (PRN):  acetaminophen   Tablet .. 650 milliGRAM(s) Oral every 6 hours PRN Mild Pain (1 - 3)      Vital Signs Last 24 Hrs  T(C): 36.4 (04 Apr 2021 06:00), Max: 36.8 (03 Apr 2021 14:45)  T(F): 97.6 (04 Apr 2021 06:00), Max: 98.2 (03 Apr 2021 14:45)  HR: 66 (04 Apr 2021 06:00) (62 - 73)  BP: 155/70 (04 Apr 2021 06:00) (127/72 - 155/70)  BP(mean): --  RR: 17 (04 Apr 2021 06:00) (16 - 18)  SpO2: 97% (04 Apr 2021 06:00) (97% - 100%)  CAPILLARY BLOOD GLUCOSE      POCT Blood Glucose.: 177 mg/dL (04 Apr 2021 09:10)  POCT Blood Glucose.: 243 mg/dL (03 Apr 2021 21:31)  POCT Blood Glucose.: 313 mg/dL (03 Apr 2021 17:57)  POCT Blood Glucose.: 395 mg/dL (03 Apr 2021 12:16)    I&O's Summary    03 Apr 2021 07:01  -  04 Apr 2021 07:00  --------------------------------------------------------  IN: 920 mL / OUT: 950 mL / NET: -30 mL        PHYSICAL EXAM:  GENERAL: NAD, well-developed  HEAD:  Atraumatic, Normocephalic  EYES: EOMI, PERRLA, conjunctiva and sclera clear  NECK: Supple, No JVD  CHEST/LUNG: +rhonchi and bibasilar crackles; No wheeze  HEART: Regular rate and rhythm; No murmurs, rubs, or gallops  ABDOMEN: Soft, Nontender, Nondistended; Bowel sounds present  EXTREMITIES:  2+ Peripheral Pulses, No clubbing, cyanosis, or edema  PSYCH: AAOx3  NEUROLOGY: non-focal  SKIN: No rashes or lesions    LABS:                        11.1   7.10  )-----------( 202      ( 04 Apr 2021 07:24 )             35.9     04-04    134<L>  |  94<L>  |  12  ----------------------------<  263<H>  4.4   |  36<H>  |  0.45<L>    Ca    9.4      04 Apr 2021 07:24  Phos  2.5     04-03  Mg     2.0     04-03    TPro  6.1  /  Alb  2.9<L>  /  TBili  0.2  /  DBili  x   /  AST  37<H>  /  ALT  60<H>  /  AlkPhos  181<H>  04-04              RADIOLOGY & ADDITIONAL TESTS:    Imaging Personally Reviewed:    Consultant(s) Notes Reviewed:      Care Discussed with Consultants/Other Providers:

## 2021-04-04 NOTE — PROGRESS NOTE ADULT - PROBLEM SELECTOR PLAN 3
Per pt, on Basaglar 10u qhs and Admelog 2u qAC at home -->   -FS better controlled after increased lantus to 22 units qHs and c/w premeal 5 Units qAC  - family reports FSBGs been running high

## 2021-04-04 NOTE — PROGRESS NOTE ADULT - PROBLEM SELECTOR PLAN 1
Leukocytosis and tachypnea. Prior sputum cx's grew HIB and Pseudomonas. Alternates between inhaled tobra and azithromycin but pt having fevers, worsening dyspnea and cough w/ increased sputum production concerning for Pseudomonas pneumonia.  - Lactate initially 1.1,  Received Zosyn x2 in the ED.   - c/w Zosyn 3.375g q8h and  inhaled tobra D#4  - BCx x2 NGTD.  sputum cx on 4/2 contaminated so reordered  - c/w nebulized budesonide, PRN albuterol nebs, and inhaled hypertonic saline.   - Chest PT  - f/u pulm recs Leukocytosis and tachypnea. Prior sputum cx's grew HIB and Pseudomonas. Alternates between inhaled tobra and azithromycin but pt having fevers, worsening dyspnea and cough w/ increased sputum production concerning for Pseudomonas pneumonia.  - Lactate initially 1.1,  Received Zosyn x2 in the ED.   - c/w Zosyn 3.375g q8h and  inhaled tobra D#4  - BCx x2 NGTD.  sputum cx on 4/2 contaminated so reordered  - c/w nebulized budesonide, PRN albuterol nebs, and inhaled hypertonic saline.   - c/w Chest PT  - f/u pulm recs

## 2021-04-05 DIAGNOSIS — R94.5 ABNORMAL RESULTS OF LIVER FUNCTION STUDIES: ICD-10-CM

## 2021-04-05 LAB
GLUCOSE BLDC GLUCOMTR-MCNC: 224 MG/DL — HIGH (ref 70–99)
GLUCOSE BLDC GLUCOMTR-MCNC: 254 MG/DL — HIGH (ref 70–99)
GLUCOSE BLDC GLUCOMTR-MCNC: 261 MG/DL — HIGH (ref 70–99)
GLUCOSE BLDC GLUCOMTR-MCNC: 326 MG/DL — HIGH (ref 70–99)
HCT VFR BLD CALC: 35.5 % — SIGNIFICANT CHANGE UP (ref 34.5–45)
HGB BLD-MCNC: 11.4 G/DL — LOW (ref 11.5–15.5)
MCHC RBC-ENTMCNC: 27.8 PG — SIGNIFICANT CHANGE UP (ref 27–34)
MCHC RBC-ENTMCNC: 32.1 GM/DL — SIGNIFICANT CHANGE UP (ref 32–36)
MCV RBC AUTO: 86.6 FL — SIGNIFICANT CHANGE UP (ref 80–100)
NRBC # BLD: 0 /100 WBCS — SIGNIFICANT CHANGE UP
NRBC # FLD: 0 K/UL — SIGNIFICANT CHANGE UP
PLATELET # BLD AUTO: 204 K/UL — SIGNIFICANT CHANGE UP (ref 150–400)
RBC # BLD: 4.1 M/UL — SIGNIFICANT CHANGE UP (ref 3.8–5.2)
RBC # FLD: 12.7 % — SIGNIFICANT CHANGE UP (ref 10.3–14.5)
WBC # BLD: 8.76 K/UL — SIGNIFICANT CHANGE UP (ref 3.8–10.5)
WBC # FLD AUTO: 8.76 K/UL — SIGNIFICANT CHANGE UP (ref 3.8–10.5)

## 2021-04-05 PROCEDURE — 99233 SBSQ HOSP IP/OBS HIGH 50: CPT

## 2021-04-05 PROCEDURE — 99233 SBSQ HOSP IP/OBS HIGH 50: CPT | Mod: GC

## 2021-04-05 RX ADMIN — Medication 500000 UNIT(S): at 10:48

## 2021-04-05 RX ADMIN — Medication 100 MILLIGRAM(S): at 22:27

## 2021-04-05 RX ADMIN — INSULIN GLARGINE 22 UNIT(S): 100 INJECTION, SOLUTION SUBCUTANEOUS at 22:28

## 2021-04-05 RX ADMIN — Medication 1: at 22:28

## 2021-04-05 RX ADMIN — Medication 0.5 MILLIGRAM(S): at 21:52

## 2021-04-05 RX ADMIN — Medication 3 MILLILITER(S): at 11:31

## 2021-04-05 RX ADMIN — PANTOPRAZOLE SODIUM 40 MILLIGRAM(S): 20 TABLET, DELAYED RELEASE ORAL at 05:09

## 2021-04-05 RX ADMIN — PIPERACILLIN AND TAZOBACTAM 25 GRAM(S): 4; .5 INJECTION, POWDER, LYOPHILIZED, FOR SOLUTION INTRAVENOUS at 10:48

## 2021-04-05 RX ADMIN — POLYETHYLENE GLYCOL 3350 17 GRAM(S): 17 POWDER, FOR SOLUTION ORAL at 13:22

## 2021-04-05 RX ADMIN — Medication 300 MILLIGRAM(S): at 21:51

## 2021-04-05 RX ADMIN — Medication 100 MILLIGRAM(S): at 05:09

## 2021-04-05 RX ADMIN — Medication 1 TABLET(S): at 13:21

## 2021-04-05 RX ADMIN — ENOXAPARIN SODIUM 40 MILLIGRAM(S): 100 INJECTION SUBCUTANEOUS at 13:21

## 2021-04-05 RX ADMIN — Medication 325 MILLIGRAM(S): at 13:21

## 2021-04-05 RX ADMIN — Medication 37.5 MILLIGRAM(S): at 13:22

## 2021-04-05 RX ADMIN — Medication 500000 UNIT(S): at 16:03

## 2021-04-05 RX ADMIN — Medication 5 UNIT(S): at 13:13

## 2021-04-05 RX ADMIN — Medication 5 UNIT(S): at 17:34

## 2021-04-05 RX ADMIN — Medication 4: at 13:12

## 2021-04-05 RX ADMIN — Medication 100 MILLIGRAM(S): at 13:28

## 2021-04-05 RX ADMIN — Medication 3 MILLILITER(S): at 21:49

## 2021-04-05 RX ADMIN — Medication 1 SPRAY(S): at 13:21

## 2021-04-05 RX ADMIN — PIPERACILLIN AND TAZOBACTAM 25 GRAM(S): 4; .5 INJECTION, POWDER, LYOPHILIZED, FOR SOLUTION INTRAVENOUS at 18:55

## 2021-04-05 RX ADMIN — Medication 20 MILLIGRAM(S): at 22:27

## 2021-04-05 RX ADMIN — SENNA PLUS 2 TABLET(S): 8.6 TABLET ORAL at 22:28

## 2021-04-05 RX ADMIN — PIPERACILLIN AND TAZOBACTAM 25 GRAM(S): 4; .5 INJECTION, POWDER, LYOPHILIZED, FOR SOLUTION INTRAVENOUS at 01:42

## 2021-04-05 RX ADMIN — Medication 500000 UNIT(S): at 22:27

## 2021-04-05 RX ADMIN — Medication 500000 UNIT(S): at 05:09

## 2021-04-05 RX ADMIN — Medication 20 MILLIGRAM(S): at 10:49

## 2021-04-05 RX ADMIN — Medication 300 MILLIGRAM(S): at 11:33

## 2021-04-05 RX ADMIN — MONTELUKAST 10 MILLIGRAM(S): 4 TABLET, CHEWABLE ORAL at 13:22

## 2021-04-05 RX ADMIN — Medication 3: at 17:33

## 2021-04-05 RX ADMIN — Medication 5 UNIT(S): at 09:24

## 2021-04-05 RX ADMIN — Medication 0.5 MILLIGRAM(S): at 11:31

## 2021-04-05 RX ADMIN — Medication 2: at 09:23

## 2021-04-05 NOTE — PROGRESS NOTE ADULT - SUBJECTIVE AND OBJECTIVE BOX
Patient is a 66y old  Female who presents with a chief complaint of Pneumonia (05 Apr 2021 09:00)      SUBJECTIVE / OVERNIGHT EVENTS: Pt seen and examined at 11:30am, no overnight events, spoke through pt's daughter Adriana as per pt's request, cough is improving per pt, no other new issues reported.    MEDICATIONS  (STANDING):  ALBUTerol    90 MICROgram(s) HFA Inhaler 1 Puff(s) Inhalation every 6 hours  albuterol/ipratropium for Nebulization 3 milliLiter(s) Nebulizer every 12 hours  benzonatate 100 milliGRAM(s) Oral three times a day  buDESOnide    Inhalation Suspension 0.5 milliGRAM(s) Inhalation every 12 hours  calcium carbonate 1250 mG  + Vitamin D (OsCal 500 + D) 1 Tablet(s) Oral daily  cyanocobalamin Injectable 1000 MICROGram(s) IntraMuscular <User Schedule>  dextrose 40% Gel 15 Gram(s) Oral once  dextrose 5%. 1000 milliLiter(s) (50 mL/Hr) IV Continuous <Continuous>  dextrose 5%. 1000 milliLiter(s) (100 mL/Hr) IV Continuous <Continuous>  dextrose 50% Injectable 25 Gram(s) IV Push once  dextrose 50% Injectable 12.5 Gram(s) IV Push once  dextrose 50% Injectable 25 Gram(s) IV Push once  enoxaparin Injectable 40 milliGRAM(s) SubCutaneous daily  ferrous    sulfate 325 milliGRAM(s) Oral daily  fluticasone propionate 50 MICROgram(s)/spray Nasal Spray 1 Spray(s) Both Nostrils <User Schedule>  glucagon  Injectable 1 milliGRAM(s) IntraMuscular once  influenza   Vaccine 0.5 milliLiter(s) IntraMuscular once  insulin glargine Injectable (LANTUS) 22 Unit(s) SubCutaneous at bedtime  insulin lispro (ADMELOG) corrective regimen sliding scale   SubCutaneous three times a day before meals  insulin lispro (ADMELOG) corrective regimen sliding scale   SubCutaneous at bedtime  insulin lispro Injectable (ADMELOG) 5 Unit(s) SubCutaneous three times a day before meals  montelukast 10 milliGRAM(s) Oral daily  nystatin    Suspension 555652 Unit(s) Swish and Swallow four times a day  pantoprazole    Tablet 40 milliGRAM(s) Oral before breakfast  piperacillin/tazobactam IVPB.. 3.375 Gram(s) IV Intermittent every 8 hours  polyethylene glycol 3350 17 Gram(s) Oral daily  predniSONE   Tablet 20 milliGRAM(s) Oral two times a day  senna 2 Tablet(s) Oral at bedtime  tiotropium 18 MICROgram(s) Capsule 1 Capsule(s) Inhalation daily  tobramycin for Nebulization 300 milliGRAM(s) Inhalation every 12 hours  venlafaxine XR. 37.5 milliGRAM(s) Oral daily    MEDICATIONS  (PRN):  acetaminophen   Tablet .. 650 milliGRAM(s) Oral every 6 hours PRN Mild Pain (1 - 3)      Vital Signs Last 24 Hrs  T(C): 36.7 (05 Apr 2021 09:51), Max: 37 (04 Apr 2021 17:41)  T(F): 98.1 (05 Apr 2021 09:51), Max: 98.6 (04 Apr 2021 17:41)  HR: 84 (05 Apr 2021 11:31) (67 - 93)  BP: 133/68 (05 Apr 2021 09:51) (133/68 - 148/81)  BP(mean): 98 (04 Apr 2021 17:41) (98 - 98)  RR: 18 (05 Apr 2021 09:51) (17 - 18)  SpO2: 96% (05 Apr 2021 11:31) (95% - 100%)  CAPILLARY BLOOD GLUCOSE      POCT Blood Glucose.: 326 mg/dL (05 Apr 2021 12:46)  POCT Blood Glucose.: 224 mg/dL (05 Apr 2021 08:53)  POCT Blood Glucose.: 322 mg/dL (04 Apr 2021 21:45)  POCT Blood Glucose.: 393 mg/dL (04 Apr 2021 17:23)  POCT Blood Glucose.: 407 mg/dL (04 Apr 2021 17:22)    I&O's Summary    04 Apr 2021 07:01  -  05 Apr 2021 07:00  --------------------------------------------------------  IN: 1160 mL / OUT: 0 mL / NET: 1160 mL        PHYSICAL EXAM:  GENERAL: NAD, thinly built female  CHEST/LUNG: b/l Rhonchi+  HEART: Regular rate and rhythm; No murmurs  ABDOMEN: Soft, Nontender, Nondistended  EXTREMITIES: no LE edema  PSYCH: Calm  NEUROLOGY: AAOx3  SKIN: No rashes or lesions    LABS:                        11.4   8.76  )-----------( 204      ( 05 Apr 2021 07:06 )             35.5     04-05    134<L>  |  95<L>  |  18  ----------------------------<  293<H>  4.3   |  35<H>  |  0.42<L>    Ca    9.1      05 Apr 2021 07:06    TPro  6.1  /  Alb  3.0<L>  /  TBili  <0.2  /  DBili  x   /  AST  45<H>  /  ALT  73<H>  /  AlkPhos  187<H>  04-05              RADIOLOGY & ADDITIONAL TESTS:    Imaging Personally Reviewed:    Consultant(s) Notes Reviewed:      Care Discussed with Consultants/Other Providers:

## 2021-04-05 NOTE — PROGRESS NOTE ADULT - ASSESSMENT
67 yo F PMH cystic bronchiectasis, PCD, ABPA, prior pseudomonas , stenotrophomonas pna, HTN, and T2DM presenting for subacute worsening shortness of breath and fevers c/f pna    - CT w/ new KELLY GGOs  - agree w/ empiric coverage w/ zosyn; sputum cx NRF  - Bcx NGTD  - okay to continue BID prednisone 20mg  - c/w budesonide BID  - c/w fluticasone  - c/w montelukast  - Spiriva on discharge, change duonebs to q6.  - c/w inhaled tobramycin  - Can start 3% NC nebulized TID with pre-treatment with duonebs, Chest vest q8 hours.   - OOB, incentive josse  - Consider MBS for complaint of dysphagia  - Weaned O2, now on RA.     - On discharge patient will need follow up with Dr. Young. Needs outpatient pulmonary follow up upon discharge at [address] [place] [address], [address] ([phone]).     Dae Hyeon Kim [id number]  Pulmonary Critical Care Fellow  Pager [phone]

## 2021-04-05 NOTE — PROGRESS NOTE ADULT - PROBLEM SELECTOR PLAN 1
Leukocytosis and tachypnea. Prior sputum cx's grew HIB and Pseudomonas. Alternates between inhaled tobra and azithromycin but pt having fevers, worsening dyspnea and cough w/ increased sputum production concerning for Pseudomonas pneumonia.  - Lactate initially 1.1,  Received Zosyn x2 in the ED.   - c/w Zosyn 3.375g q8h and  inhaled tobra D#5  - BCx x2 NGTD.  sputum cx on 4/2 contaminated so reordered - f/u  - c/w nebulized budesonide, PRN albuterol nebs, and inhaled hypertonic saline.   - c/w Chest PT  - f/u pulm recs

## 2021-04-05 NOTE — PROGRESS NOTE ADULT - SUBJECTIVE AND OBJECTIVE BOX
Interval Events: Patient was seen and examined at bedside. No overnight events.    REVIEW OF SYSTEMS:  Constitutional: [ ] fevers [ ] chills [ ] weight loss [ ] weight gain  CV: [ ] chest pain [ ] orthopnea [ ] palpitations [ ] murmur  Resp: [ ] cough [ ] shortness of breath [ ] dyspnea [ ] wheezing [ ] sputum [ ] hemoptysis  [ ] All other systems negative  [ ] Unable to assess ROS because ________    OBJECTIVE:  ICU Vital Signs Last 24 Hrs  T(C): 36.5 (05 Apr 2021 06:15), Max: 37 (04 Apr 2021 17:41)  T(F): 97.7 (05 Apr 2021 06:15), Max: 98.6 (04 Apr 2021 17:41)  HR: 78 (05 Apr 2021 06:15) (67 - 93)  BP: 148/79 (05 Apr 2021 06:15) (126/68 - 148/81)  BP(mean): 98 (04 Apr 2021 17:41) (98 - 98)  ABP: --  ABP(mean): --  RR: 18 (05 Apr 2021 06:15) (17 - 18)  SpO2: 100% (05 Apr 2021 06:15) (95% - 100%)        04-04 @ 07:01  -  04-05 @ 07:00  --------------------------------------------------------  IN: 1160 mL / OUT: 0 mL / NET: 1160 mL      CAPILLARY BLOOD GLUCOSE      POCT Blood Glucose.: 224 mg/dL (05 Apr 2021 08:53)      PHYSICAL EXAM:        HOSPITAL MEDICATIONS:  MEDICATIONS  (STANDING):  ALBUTerol    90 MICROgram(s) HFA Inhaler 1 Puff(s) Inhalation every 6 hours  albuterol/ipratropium for Nebulization 3 milliLiter(s) Nebulizer every 12 hours  benzonatate 100 milliGRAM(s) Oral three times a day  buDESOnide    Inhalation Suspension 0.5 milliGRAM(s) Inhalation every 12 hours  calcium carbonate 1250 mG  + Vitamin D (OsCal 500 + D) 1 Tablet(s) Oral daily  cyanocobalamin Injectable 1000 MICROGram(s) IntraMuscular <User Schedule>  dextrose 40% Gel 15 Gram(s) Oral once  dextrose 5%. 1000 milliLiter(s) (50 mL/Hr) IV Continuous <Continuous>  dextrose 5%. 1000 milliLiter(s) (100 mL/Hr) IV Continuous <Continuous>  dextrose 50% Injectable 25 Gram(s) IV Push once  dextrose 50% Injectable 12.5 Gram(s) IV Push once  dextrose 50% Injectable 25 Gram(s) IV Push once  enoxaparin Injectable 40 milliGRAM(s) SubCutaneous daily  ferrous    sulfate 325 milliGRAM(s) Oral daily  fluticasone propionate 50 MICROgram(s)/spray Nasal Spray 1 Spray(s) Both Nostrils <User Schedule>  glucagon  Injectable 1 milliGRAM(s) IntraMuscular once  influenza   Vaccine 0.5 milliLiter(s) IntraMuscular once  insulin glargine Injectable (LANTUS) 22 Unit(s) SubCutaneous at bedtime  insulin lispro (ADMELOG) corrective regimen sliding scale   SubCutaneous three times a day before meals  insulin lispro (ADMELOG) corrective regimen sliding scale   SubCutaneous at bedtime  insulin lispro Injectable (ADMELOG) 5 Unit(s) SubCutaneous three times a day before meals  montelukast 10 milliGRAM(s) Oral daily  nystatin    Suspension 282676 Unit(s) Swish and Swallow four times a day  pantoprazole    Tablet 40 milliGRAM(s) Oral before breakfast  piperacillin/tazobactam IVPB.. 3.375 Gram(s) IV Intermittent every 8 hours  polyethylene glycol 3350 17 Gram(s) Oral daily  predniSONE   Tablet 20 milliGRAM(s) Oral two times a day  senna 2 Tablet(s) Oral at bedtime  tiotropium 18 MICROgram(s) Capsule 1 Capsule(s) Inhalation daily  tobramycin for Nebulization 300 milliGRAM(s) Inhalation every 12 hours  venlafaxine XR. 37.5 milliGRAM(s) Oral daily    MEDICATIONS  (PRN):  acetaminophen   Tablet .. 650 milliGRAM(s) Oral every 6 hours PRN Mild Pain (1 - 3)      LABS:                        11.4   8.76  )-----------( 204      ( 05 Apr 2021 07:06 )             35.5     Hgb Trend: 11.4<--, 11.1<--, 10.8<--, 11.4<--, 13.2<--  04-05    134<L>  |  95<L>  |  18  ----------------------------<  293<H>  4.3   |  35<H>  |  0.42<L>    Ca    9.1      05 Apr 2021 07:06    TPro  6.1  /  Alb  3.0<L>  /  TBili  <0.2  /  DBili  x   /  AST  45<H>  /  ALT  73<H>  /  AlkPhos  187<H>  04-05    Creatinine Trend: 0.42<--, 0.45<--, 0.38<--, 0.38<--, 0.45<--, 0.51<--          MICROBIOLOGY:     RADIOLOGY:  [ ] Reviewed and interpreted by me   Interval Events: Patient was seen and examined at bedside. No overnight events.    Still with cough, sputum which is chronic, also with wheezing. Now on RA. Patient also c/o dysphagia today.     REVIEW OF SYSTEMS:  Constitutional: [ ] fevers [ ] chills [ ] weight loss [ ] weight gain  CV: [ ] chest pain [ ] orthopnea [ ] palpitations [ ] murmur  Resp: [ x] cough [ ] shortness of breath [ ] dyspnea [ x] wheezing [x] sputum [ ] hemoptysis  [ ] All other systems negative  [ ] Unable to assess ROS because ________    OBJECTIVE:  ICU Vital Signs Last 24 Hrs  T(C): 36.5 (05 Apr 2021 06:15), Max: 37 (04 Apr 2021 17:41)  T(F): 97.7 (05 Apr 2021 06:15), Max: 98.6 (04 Apr 2021 17:41)  HR: 78 (05 Apr 2021 06:15) (67 - 93)  BP: 148/79 (05 Apr 2021 06:15) (126/68 - 148/81)  BP(mean): 98 (04 Apr 2021 17:41) (98 - 98)  ABP: --  ABP(mean): --  RR: 18 (05 Apr 2021 06:15) (17 - 18)  SpO2: 100% (05 Apr 2021 06:15) (95% - 100%)        04-04 @ 07:01  -  04-05 @ 07:00  --------------------------------------------------------  IN: 1160 mL / OUT: 0 mL / NET: 1160 mL      CAPILLARY BLOOD GLUCOSE      POCT Blood Glucose.: 224 mg/dL (05 Apr 2021 08:53)    PHYSICAL EXAM:  GENERAL: NAD, thinly built female  CHEST/LUNG: b/l Rhonchi+  HEART: Regular rate and rhythm; No murmurs  ABDOMEN: Soft, Nontender, Nondistended  EXTREMITIES: no LE edema  PSYCH: Calm  NEUROLOGY: AAOx3  SKIN: No rashes or lesions      HOSPITAL MEDICATIONS:  MEDICATIONS  (STANDING):  ALBUTerol    90 MICROgram(s) HFA Inhaler 1 Puff(s) Inhalation every 6 hours  albuterol/ipratropium for Nebulization 3 milliLiter(s) Nebulizer every 12 hours  benzonatate 100 milliGRAM(s) Oral three times a day  buDESOnide    Inhalation Suspension 0.5 milliGRAM(s) Inhalation every 12 hours  calcium carbonate 1250 mG  + Vitamin D (OsCal 500 + D) 1 Tablet(s) Oral daily  cyanocobalamin Injectable 1000 MICROGram(s) IntraMuscular <User Schedule>  dextrose 40% Gel 15 Gram(s) Oral once  dextrose 5%. 1000 milliLiter(s) (50 mL/Hr) IV Continuous <Continuous>  dextrose 5%. 1000 milliLiter(s) (100 mL/Hr) IV Continuous <Continuous>  dextrose 50% Injectable 25 Gram(s) IV Push once  dextrose 50% Injectable 12.5 Gram(s) IV Push once  dextrose 50% Injectable 25 Gram(s) IV Push once  enoxaparin Injectable 40 milliGRAM(s) SubCutaneous daily  ferrous    sulfate 325 milliGRAM(s) Oral daily  fluticasone propionate 50 MICROgram(s)/spray Nasal Spray 1 Spray(s) Both Nostrils <User Schedule>  glucagon  Injectable 1 milliGRAM(s) IntraMuscular once  influenza   Vaccine 0.5 milliLiter(s) IntraMuscular once  insulin glargine Injectable (LANTUS) 22 Unit(s) SubCutaneous at bedtime  insulin lispro (ADMELOG) corrective regimen sliding scale   SubCutaneous three times a day before meals  insulin lispro (ADMELOG) corrective regimen sliding scale   SubCutaneous at bedtime  insulin lispro Injectable (ADMELOG) 5 Unit(s) SubCutaneous three times a day before meals  montelukast 10 milliGRAM(s) Oral daily  nystatin    Suspension 945204 Unit(s) Swish and Swallow four times a day  pantoprazole    Tablet 40 milliGRAM(s) Oral before breakfast  piperacillin/tazobactam IVPB.. 3.375 Gram(s) IV Intermittent every 8 hours  polyethylene glycol 3350 17 Gram(s) Oral daily  predniSONE   Tablet 20 milliGRAM(s) Oral two times a day  senna 2 Tablet(s) Oral at bedtime  tiotropium 18 MICROgram(s) Capsule 1 Capsule(s) Inhalation daily  tobramycin for Nebulization 300 milliGRAM(s) Inhalation every 12 hours  venlafaxine XR. 37.5 milliGRAM(s) Oral daily    MEDICATIONS  (PRN):  acetaminophen   Tablet .. 650 milliGRAM(s) Oral every 6 hours PRN Mild Pain (1 - 3)      LABS:                        11.4   8.76  )-----------( 204      ( 05 Apr 2021 07:06 )             35.5     Hgb Trend: 11.4<--, 11.1<--, 10.8<--, 11.4<--, 13.2<--  04-05    134<L>  |  95<L>  |  18  ----------------------------<  293<H>  4.3   |  35<H>  |  0.42<L>    Ca    9.1      05 Apr 2021 07:06    TPro  6.1  /  Alb  3.0<L>  /  TBili  <0.2  /  DBili  x   /  AST  45<H>  /  ALT  73<H>  /  AlkPhos  187<H>  04-05    Creatinine Trend: 0.42<--, 0.45<--, 0.38<--, 0.38<--, 0.45<--, 0.51<--          MICROBIOLOGY:     RADIOLOGY:  [ ] Reviewed and interpreted by me

## 2021-04-05 NOTE — PROGRESS NOTE ADULT - PROBLEM SELECTOR PLAN 3
Per pt, on Basaglar 10u qhs and Admelog 2u qAC at home -->   -Cont lantus to 22 units qHs and c/w premeal 5 Units qAC  - cont to monitor and adjust insulin prn

## 2021-04-06 LAB
ALBUMIN SERPL ELPH-MCNC: 3 G/DL — LOW (ref 3.3–5)
ALP SERPL-CCNC: 187 U/L — HIGH (ref 40–120)
ALT FLD-CCNC: 92 U/L — HIGH (ref 4–33)
ANION GAP SERPL CALC-SCNC: 5 MMOL/L — LOW (ref 7–14)
ANION GAP SERPL CALC-SCNC: 7 MMOL/L — SIGNIFICANT CHANGE UP (ref 7–14)
AST SERPL-CCNC: 63 U/L — HIGH (ref 4–32)
BILIRUB SERPL-MCNC: 0.2 MG/DL — SIGNIFICANT CHANGE UP (ref 0.2–1.2)
BUN SERPL-MCNC: 19 MG/DL — SIGNIFICANT CHANGE UP (ref 7–23)
BUN SERPL-MCNC: 20 MG/DL — SIGNIFICANT CHANGE UP (ref 7–23)
CALCIUM SERPL-MCNC: 8.9 MG/DL — SIGNIFICANT CHANGE UP (ref 8.4–10.5)
CALCIUM SERPL-MCNC: 9.3 MG/DL — SIGNIFICANT CHANGE UP (ref 8.4–10.5)
CHLORIDE SERPL-SCNC: 92 MMOL/L — LOW (ref 98–107)
CHLORIDE SERPL-SCNC: 93 MMOL/L — LOW (ref 98–107)
CO2 SERPL-SCNC: 30 MMOL/L — SIGNIFICANT CHANGE UP (ref 22–31)
CO2 SERPL-SCNC: 33 MMOL/L — HIGH (ref 22–31)
CREAT SERPL-MCNC: 0.42 MG/DL — LOW (ref 0.5–1.3)
CREAT SERPL-MCNC: 0.49 MG/DL — LOW (ref 0.5–1.3)
CULTURE RESULTS: SIGNIFICANT CHANGE UP
CULTURE RESULTS: SIGNIFICANT CHANGE UP
GLUCOSE BLDC GLUCOMTR-MCNC: 173 MG/DL — HIGH (ref 70–99)
GLUCOSE BLDC GLUCOMTR-MCNC: 174 MG/DL — HIGH (ref 70–99)
GLUCOSE BLDC GLUCOMTR-MCNC: 296 MG/DL — HIGH (ref 70–99)
GLUCOSE BLDC GLUCOMTR-MCNC: 349 MG/DL — HIGH (ref 70–99)
GLUCOSE BLDC GLUCOMTR-MCNC: 500 MG/DL — CRITICAL HIGH (ref 70–99)
GLUCOSE BLDC GLUCOMTR-MCNC: 509 MG/DL — CRITICAL HIGH (ref 70–99)
GLUCOSE BLDC GLUCOMTR-MCNC: 518 MG/DL — CRITICAL HIGH (ref 70–99)
GLUCOSE BLDC GLUCOMTR-MCNC: 526 MG/DL — CRITICAL HIGH (ref 70–99)
GLUCOSE SERPL-MCNC: 312 MG/DL — HIGH (ref 70–99)
GLUCOSE SERPL-MCNC: 406 MG/DL — HIGH (ref 70–99)
HAV IGM SER-ACNC: SIGNIFICANT CHANGE UP
HBV CORE IGM SER-ACNC: SIGNIFICANT CHANGE UP
HBV SURFACE AG SER-ACNC: SIGNIFICANT CHANGE UP
HCT VFR BLD CALC: 36.4 % — SIGNIFICANT CHANGE UP (ref 34.5–45)
HCV AB S/CO SERPL IA: 5.13 S/CO — HIGH (ref 0–0.99)
HCV AB SERPL-IMP: REACTIVE
HGB BLD-MCNC: 11.6 G/DL — SIGNIFICANT CHANGE UP (ref 11.5–15.5)
MAGNESIUM SERPL-MCNC: 2.2 MG/DL — SIGNIFICANT CHANGE UP (ref 1.6–2.6)
MCHC RBC-ENTMCNC: 28.1 PG — SIGNIFICANT CHANGE UP (ref 27–34)
MCHC RBC-ENTMCNC: 31.9 GM/DL — LOW (ref 32–36)
MCV RBC AUTO: 88.1 FL — SIGNIFICANT CHANGE UP (ref 80–100)
NRBC # BLD: 0 /100 WBCS — SIGNIFICANT CHANGE UP
NRBC # FLD: 0 K/UL — SIGNIFICANT CHANGE UP
PHOSPHATE SERPL-MCNC: 2.6 MG/DL — SIGNIFICANT CHANGE UP (ref 2.5–4.5)
PLATELET # BLD AUTO: 196 K/UL — SIGNIFICANT CHANGE UP (ref 150–400)
POTASSIUM SERPL-MCNC: 4.1 MMOL/L — SIGNIFICANT CHANGE UP (ref 3.5–5.3)
POTASSIUM SERPL-MCNC: 4.5 MMOL/L — SIGNIFICANT CHANGE UP (ref 3.5–5.3)
POTASSIUM SERPL-SCNC: 4.1 MMOL/L — SIGNIFICANT CHANGE UP (ref 3.5–5.3)
POTASSIUM SERPL-SCNC: 4.5 MMOL/L — SIGNIFICANT CHANGE UP (ref 3.5–5.3)
PROT SERPL-MCNC: 6.2 G/DL — SIGNIFICANT CHANGE UP (ref 6–8.3)
RBC # BLD: 4.13 M/UL — SIGNIFICANT CHANGE UP (ref 3.8–5.2)
RBC # FLD: 12.8 % — SIGNIFICANT CHANGE UP (ref 10.3–14.5)
SODIUM SERPL-SCNC: 129 MMOL/L — LOW (ref 135–145)
SODIUM SERPL-SCNC: 131 MMOL/L — LOW (ref 135–145)
SPECIMEN SOURCE: SIGNIFICANT CHANGE UP
SPECIMEN SOURCE: SIGNIFICANT CHANGE UP
WBC # BLD: 7.65 K/UL — SIGNIFICANT CHANGE UP (ref 3.8–10.5)
WBC # FLD AUTO: 7.65 K/UL — SIGNIFICANT CHANGE UP (ref 3.8–10.5)

## 2021-04-06 PROCEDURE — 99233 SBSQ HOSP IP/OBS HIGH 50: CPT

## 2021-04-06 PROCEDURE — 76705 ECHO EXAM OF ABDOMEN: CPT | Mod: 26

## 2021-04-06 PROCEDURE — 99233 SBSQ HOSP IP/OBS HIGH 50: CPT | Mod: GC

## 2021-04-06 RX ORDER — DIPHENHYDRAMINE HYDROCHLORIDE AND LIDOCAINE HYDROCHLORIDE AND ALUMINUM HYDROXIDE AND MAGNESIUM HYDRO
10 KIT EVERY 8 HOURS
Refills: 0 | Status: DISCONTINUED | OUTPATIENT
Start: 2021-04-06 | End: 2021-04-06

## 2021-04-06 RX ORDER — INSULIN LISPRO 100/ML
8 VIAL (ML) SUBCUTANEOUS
Refills: 0 | Status: DISCONTINUED | OUTPATIENT
Start: 2021-04-06 | End: 2021-04-07

## 2021-04-06 RX ORDER — DIPHENHYDRAMINE HYDROCHLORIDE AND LIDOCAINE HYDROCHLORIDE AND ALUMINUM HYDROXIDE AND MAGNESIUM HYDRO
10 KIT EVERY 8 HOURS
Refills: 0 | Status: DISCONTINUED | OUTPATIENT
Start: 2021-04-06 | End: 2021-04-18

## 2021-04-06 RX ORDER — SODIUM CHLORIDE 9 MG/ML
4 INJECTION INTRAMUSCULAR; INTRAVENOUS; SUBCUTANEOUS EVERY 8 HOURS
Refills: 0 | Status: DISCONTINUED | OUTPATIENT
Start: 2021-04-06 | End: 2021-04-18

## 2021-04-06 RX ORDER — BUDESONIDE AND FORMOTEROL FUMARATE DIHYDRATE 160; 4.5 UG/1; UG/1
2 AEROSOL RESPIRATORY (INHALATION)
Refills: 0 | Status: DISCONTINUED | OUTPATIENT
Start: 2021-04-06 | End: 2021-04-18

## 2021-04-06 RX ORDER — INSULIN GLARGINE 100 [IU]/ML
30 INJECTION, SOLUTION SUBCUTANEOUS AT BEDTIME
Refills: 0 | Status: DISCONTINUED | OUTPATIENT
Start: 2021-04-06 | End: 2021-04-07

## 2021-04-06 RX ORDER — IPRATROPIUM/ALBUTEROL SULFATE 18-103MCG
3 AEROSOL WITH ADAPTER (GRAM) INHALATION EVERY 6 HOURS
Refills: 0 | Status: DISCONTINUED | OUTPATIENT
Start: 2021-04-06 | End: 2021-04-08

## 2021-04-06 RX ORDER — BENZOCAINE AND MENTHOL 5; 1 G/100ML; G/100ML
1 LIQUID ORAL EVERY 6 HOURS
Refills: 0 | Status: DISCONTINUED | OUTPATIENT
Start: 2021-04-06 | End: 2021-04-18

## 2021-04-06 RX ADMIN — SENNA PLUS 2 TABLET(S): 8.6 TABLET ORAL at 21:25

## 2021-04-06 RX ADMIN — Medication 0.5 MILLIGRAM(S): at 09:42

## 2021-04-06 RX ADMIN — Medication 1 TABLET(S): at 11:59

## 2021-04-06 RX ADMIN — Medication 100 MILLIGRAM(S): at 21:26

## 2021-04-06 RX ADMIN — PIPERACILLIN AND TAZOBACTAM 25 GRAM(S): 4; .5 INJECTION, POWDER, LYOPHILIZED, FOR SOLUTION INTRAVENOUS at 17:45

## 2021-04-06 RX ADMIN — Medication 20 MILLIGRAM(S): at 10:00

## 2021-04-06 RX ADMIN — Medication 500000 UNIT(S): at 17:44

## 2021-04-06 RX ADMIN — BENZOCAINE AND MENTHOL 1 LOZENGE: 5; 1 LIQUID ORAL at 17:45

## 2021-04-06 RX ADMIN — PIPERACILLIN AND TAZOBACTAM 25 GRAM(S): 4; .5 INJECTION, POWDER, LYOPHILIZED, FOR SOLUTION INTRAVENOUS at 09:59

## 2021-04-06 RX ADMIN — POLYETHYLENE GLYCOL 3350 17 GRAM(S): 17 POWDER, FOR SOLUTION ORAL at 12:00

## 2021-04-06 RX ADMIN — SODIUM CHLORIDE 4 MILLILITER(S): 9 INJECTION INTRAMUSCULAR; INTRAVENOUS; SUBCUTANEOUS at 22:18

## 2021-04-06 RX ADMIN — Medication 300 MILLIGRAM(S): at 09:43

## 2021-04-06 RX ADMIN — INSULIN GLARGINE 30 UNIT(S): 100 INJECTION, SOLUTION SUBCUTANEOUS at 21:37

## 2021-04-06 RX ADMIN — Medication 37.5 MILLIGRAM(S): at 11:59

## 2021-04-06 RX ADMIN — Medication 300 MILLIGRAM(S): at 22:20

## 2021-04-06 RX ADMIN — Medication 6: at 11:59

## 2021-04-06 RX ADMIN — Medication 500000 UNIT(S): at 21:26

## 2021-04-06 RX ADMIN — PANTOPRAZOLE SODIUM 40 MILLIGRAM(S): 20 TABLET, DELAYED RELEASE ORAL at 06:10

## 2021-04-06 RX ADMIN — Medication 1: at 17:45

## 2021-04-06 RX ADMIN — Medication 8 UNIT(S): at 17:45

## 2021-04-06 RX ADMIN — ENOXAPARIN SODIUM 40 MILLIGRAM(S): 100 INJECTION SUBCUTANEOUS at 12:00

## 2021-04-06 RX ADMIN — Medication 5 UNIT(S): at 11:59

## 2021-04-06 RX ADMIN — Medication 1 SPRAY(S): at 11:59

## 2021-04-06 RX ADMIN — DIPHENHYDRAMINE HYDROCHLORIDE AND LIDOCAINE HYDROCHLORIDE AND ALUMINUM HYDROXIDE AND MAGNESIUM HYDRO 10 MILLILITER(S): KIT at 21:26

## 2021-04-06 RX ADMIN — BUDESONIDE AND FORMOTEROL FUMARATE DIHYDRATE 2 PUFF(S): 160; 4.5 AEROSOL RESPIRATORY (INHALATION) at 21:25

## 2021-04-06 RX ADMIN — MONTELUKAST 10 MILLIGRAM(S): 4 TABLET, CHEWABLE ORAL at 12:00

## 2021-04-06 RX ADMIN — Medication 500000 UNIT(S): at 09:59

## 2021-04-06 RX ADMIN — Medication 3: at 09:59

## 2021-04-06 RX ADMIN — Medication 3 MILLILITER(S): at 09:43

## 2021-04-06 RX ADMIN — Medication 5 UNIT(S): at 09:58

## 2021-04-06 RX ADMIN — Medication 3 MILLILITER(S): at 22:15

## 2021-04-06 RX ADMIN — Medication 100 MILLIGRAM(S): at 06:10

## 2021-04-06 RX ADMIN — PIPERACILLIN AND TAZOBACTAM 25 GRAM(S): 4; .5 INJECTION, POWDER, LYOPHILIZED, FOR SOLUTION INTRAVENOUS at 01:44

## 2021-04-06 RX ADMIN — Medication 325 MILLIGRAM(S): at 11:59

## 2021-04-06 RX ADMIN — Medication 100 MILLIGRAM(S): at 14:20

## 2021-04-06 RX ADMIN — Medication 500000 UNIT(S): at 04:40

## 2021-04-06 NOTE — PROGRESS NOTE ADULT - PROBLEM SELECTOR PLAN 3
Per pt, on Basaglar 10u qhs and Admelog 2u qAC at home -->   - lantus increased to 30 units qHs and premeal increased to 8 Units qAC, if glucose remains uncontrolled will get endo consult, rpt bmp later today  - cont to monitor and adjust insulin prn

## 2021-04-06 NOTE — PROGRESS NOTE ADULT - SUBJECTIVE AND OBJECTIVE BOX
Patient is a 66y old  Female who presents with a chief complaint of Pneumonia (06 Apr 2021 12:22)      SUBJECTIVE / OVERNIGHT EVENTS: Pt seen and examined at 11:55am, no overnight events, spoke through pt's daughter Brian as her other daughter - (Adriana) not available as per pt's request, cough is improving per pt, c/o throat pain, per nsg eating her meals however without any difficulty, finger stick glucose elevated, waiting for swallow eval, no other new issues reported.      MEDICATIONS  (STANDING):  ALBUTerol    90 MICROgram(s) HFA Inhaler 1 Puff(s) Inhalation every 6 hours  albuterol/ipratropium for Nebulization 3 milliLiter(s) Nebulizer every 12 hours  benzonatate 100 milliGRAM(s) Oral three times a day  buDESOnide    Inhalation Suspension 0.5 milliGRAM(s) Inhalation every 12 hours  calcium carbonate 1250 mG  + Vitamin D (OsCal 500 + D) 1 Tablet(s) Oral daily  cyanocobalamin Injectable 1000 MICROGram(s) IntraMuscular <User Schedule>  dextrose 40% Gel 15 Gram(s) Oral once  dextrose 5%. 1000 milliLiter(s) (50 mL/Hr) IV Continuous <Continuous>  dextrose 5%. 1000 milliLiter(s) (100 mL/Hr) IV Continuous <Continuous>  dextrose 50% Injectable 25 Gram(s) IV Push once  dextrose 50% Injectable 12.5 Gram(s) IV Push once  dextrose 50% Injectable 25 Gram(s) IV Push once  enoxaparin Injectable 40 milliGRAM(s) SubCutaneous daily  ferrous    sulfate 325 milliGRAM(s) Oral daily  fluticasone propionate 50 MICROgram(s)/spray Nasal Spray 1 Spray(s) Both Nostrils <User Schedule>  glucagon  Injectable 1 milliGRAM(s) IntraMuscular once  influenza   Vaccine 0.5 milliLiter(s) IntraMuscular once  insulin glargine Injectable (LANTUS) 30 Unit(s) SubCutaneous at bedtime  insulin lispro (ADMELOG) corrective regimen sliding scale   SubCutaneous three times a day before meals  insulin lispro (ADMELOG) corrective regimen sliding scale   SubCutaneous at bedtime  insulin lispro Injectable (ADMELOG) 8 Unit(s) SubCutaneous three times a day before meals  montelukast 10 milliGRAM(s) Oral daily  nystatin    Suspension 205377 Unit(s) Swish and Swallow four times a day  pantoprazole    Tablet 40 milliGRAM(s) Oral before breakfast  piperacillin/tazobactam IVPB.. 3.375 Gram(s) IV Intermittent every 8 hours  polyethylene glycol 3350 17 Gram(s) Oral daily  predniSONE   Tablet 20 milliGRAM(s) Oral two times a day  senna 2 Tablet(s) Oral at bedtime  tiotropium 18 MICROgram(s) Capsule 1 Capsule(s) Inhalation daily  tobramycin for Nebulization 300 milliGRAM(s) Inhalation every 12 hours  venlafaxine XR. 37.5 milliGRAM(s) Oral daily    MEDICATIONS  (PRN):  acetaminophen   Tablet .. 650 milliGRAM(s) Oral every 6 hours PRN Mild Pain (1 - 3)      Vital Signs Last 24 Hrs  T(C): 36.2 (06 Apr 2021 14:21), Max: 36.9 (06 Apr 2021 09:43)  T(F): 97.2 (06 Apr 2021 14:21), Max: 98.4 (06 Apr 2021 09:43)  HR: 74 (06 Apr 2021 14:21) (67 - 78)  BP: 133/74 (06 Apr 2021 14:21) (114/54 - 146/76)  BP(mean): --  RR: 17 (06 Apr 2021 14:21) (16 - 17)  SpO2: 96% (06 Apr 2021 14:21) (96% - 100%)  CAPILLARY BLOOD GLUCOSE      POCT Blood Glucose.: 349 mg/dL (06 Apr 2021 14:17)  POCT Blood Glucose.: 509 mg/dL (06 Apr 2021 12:42)  POCT Blood Glucose.: 518 mg/dL (06 Apr 2021 12:39)  POCT Blood Glucose.: 526 mg/dL (06 Apr 2021 11:54)  POCT Blood Glucose.: 500 mg/dL (06 Apr 2021 11:52)  POCT Blood Glucose.: 296 mg/dL (06 Apr 2021 09:46)  POCT Blood Glucose.: 254 mg/dL (05 Apr 2021 21:41)  POCT Blood Glucose.: 261 mg/dL (05 Apr 2021 17:19)    I&O's Summary    05 Apr 2021 07:01  -  06 Apr 2021 07:00  --------------------------------------------------------  IN: 440 mL / OUT: 0 mL / NET: 440 mL        PHYSICAL EXAM:  GENERAL: NAD, thinly built female  HEENT: no thrush  CHEST/LUNG: b/l decreased bs, coughs upon deep breath  HEART: Regular rate and rhythm; No murmurs  ABDOMEN: Soft, Nontender, Nondistended  EXTREMITIES: no LE edema  PSYCH: Calm  NEUROLOGY: AAOx3  SKIN: No rashes or lesions    LABS:                        11.6   7.65  )-----------( 196      ( 06 Apr 2021 07:54 )             36.4     04-06    129<L>  |  92<L>  |  19  ----------------------------<  406<H>  4.1   |  30  |  0.42<L>    Ca    9.3      06 Apr 2021 14:00  Phos  2.6     04-06  Mg     2.2     04-06    TPro  x   /  Alb  x   /  TBili  0.2  /  DBili  x   /  AST  x   /  ALT  x   /  AlkPhos  x   04-06              RADIOLOGY & ADDITIONAL TESTS:    Imaging Personally Reviewed:    Consultant(s) Notes Reviewed:      Care Discussed with Consultants/Other Providers:

## 2021-04-06 NOTE — PROGRESS NOTE ADULT - ASSESSMENT
65 yo F PMH cystic bronchiectasis, PCD, ABPA, prior pseudomonas , stenotrophomonas pna, HTN, and T2DM presenting for subacute worsening shortness of breath and fevers c/f pna    - CT w/ new KELLY GGOs  - agree w/ empiric coverage w/ zosyn; sputum cx with rare pseudomonas.   - Bcx NGTD  - Can decrease steroids to 20mg daily.   - Change from Pulmicort to Symbicort BID  - c/w fluticasone  - c/w montelukast  - Spiriva on discharge, change duonebs to q8.  - c/w inhaled tobramycin  - C/W zosyn, f/u sensitivities Will likely need 10-14 days of treatment.   - Can start hypersal nebulized TID with pre-treatment with duonebs, Chest PT. Aerobika for secretion   - OOB, incentive josse  - Consider MBS for complaint of dysphagia  - Weaned O2, now on RA.     - On discharge patient will need follow up with Dr. Young. Needs outpatient pulmonary follow up upon discharge at [address] [place] [address], [address] ([phone]).     Dae Hyeon Kim [id number]  Pulmonary Critical Care Fellow  Pager [phone]

## 2021-04-06 NOTE — SWALLOW BEDSIDE ASSESSMENT ADULT - COMMENTS
H&P: 66 y.o. 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    Pt is Leandro speaking. Pt called her daughter Brian at bedside who  translated per request of patient. Pt received sitting upright at bedside, in NAD, on nasal cannula. Pt w/ adequate ability to follow commands, communicating verbally. Pt c/o of sore throat, throat pain but denies difficulty swallowing. Reports consumes a regular solid diet with thin liquids in home environment.

## 2021-04-06 NOTE — SWALLOW BEDSIDE ASSESSMENT ADULT - PHARYNGEAL PHASE
No overt s/s of penetration/aspiration demonstrated/Within functional limits No overt s/s of penetration/aspiration demonstrated for ~4 ounces/Within functional limits

## 2021-04-06 NOTE — PROGRESS NOTE ADULT - SUBJECTIVE AND OBJECTIVE BOX
Interval Events: Patient was seen and examined at bedside. No overnight events.    REVIEW OF SYSTEMS:  Constitutional: [ ] fevers [ ] chills [ ] weight loss [ ] weight gain  CV: [ ] chest pain [ ] orthopnea [ ] palpitations [ ] murmur  Resp: [ ] cough [ ] shortness of breath [ ] dyspnea [ ] wheezing [ ] sputum [ ] hemoptysis  [ ] All other systems negative  [ ] Unable to assess ROS because ________    OBJECTIVE:  ICU Vital Signs Last 24 Hrs  T(C): 36.9 (06 Apr 2021 09:43), Max: 36.9 (06 Apr 2021 09:43)  T(F): 98.4 (06 Apr 2021 09:43), Max: 98.4 (06 Apr 2021 09:43)  HR: 68 (06 Apr 2021 09:46) (67 - 78)  BP: 139/73 (06 Apr 2021 09:43) (114/54 - 146/76)  BP(mean): --  ABP: --  ABP(mean): --  RR: 16 (06 Apr 2021 09:43) (16 - 17)  SpO2: 97% (06 Apr 2021 09:46) (97% - 100%)        04-05 @ 07:01  -  04-06 @ 07:00  --------------------------------------------------------  IN: 440 mL / OUT: 0 mL / NET: 440 mL      CAPILLARY BLOOD GLUCOSE      POCT Blood Glucose.: 526 mg/dL (06 Apr 2021 11:54)      PHYSICAL EXAM:        HOSPITAL MEDICATIONS:  MEDICATIONS  (STANDING):  ALBUTerol    90 MICROgram(s) HFA Inhaler 1 Puff(s) Inhalation every 6 hours  albuterol/ipratropium for Nebulization 3 milliLiter(s) Nebulizer every 12 hours  benzonatate 100 milliGRAM(s) Oral three times a day  buDESOnide    Inhalation Suspension 0.5 milliGRAM(s) Inhalation every 12 hours  calcium carbonate 1250 mG  + Vitamin D (OsCal 500 + D) 1 Tablet(s) Oral daily  cyanocobalamin Injectable 1000 MICROGram(s) IntraMuscular <User Schedule>  dextrose 40% Gel 15 Gram(s) Oral once  dextrose 5%. 1000 milliLiter(s) (50 mL/Hr) IV Continuous <Continuous>  dextrose 5%. 1000 milliLiter(s) (100 mL/Hr) IV Continuous <Continuous>  dextrose 50% Injectable 25 Gram(s) IV Push once  dextrose 50% Injectable 12.5 Gram(s) IV Push once  dextrose 50% Injectable 25 Gram(s) IV Push once  enoxaparin Injectable 40 milliGRAM(s) SubCutaneous daily  ferrous    sulfate 325 milliGRAM(s) Oral daily  fluticasone propionate 50 MICROgram(s)/spray Nasal Spray 1 Spray(s) Both Nostrils <User Schedule>  glucagon  Injectable 1 milliGRAM(s) IntraMuscular once  influenza   Vaccine 0.5 milliLiter(s) IntraMuscular once  insulin glargine Injectable (LANTUS) 22 Unit(s) SubCutaneous at bedtime  insulin lispro (ADMELOG) corrective regimen sliding scale   SubCutaneous three times a day before meals  insulin lispro (ADMELOG) corrective regimen sliding scale   SubCutaneous at bedtime  insulin lispro Injectable (ADMELOG) 5 Unit(s) SubCutaneous three times a day before meals  montelukast 10 milliGRAM(s) Oral daily  nystatin    Suspension 063772 Unit(s) Swish and Swallow four times a day  pantoprazole    Tablet 40 milliGRAM(s) Oral before breakfast  piperacillin/tazobactam IVPB.. 3.375 Gram(s) IV Intermittent every 8 hours  polyethylene glycol 3350 17 Gram(s) Oral daily  predniSONE   Tablet 20 milliGRAM(s) Oral two times a day  senna 2 Tablet(s) Oral at bedtime  tiotropium 18 MICROgram(s) Capsule 1 Capsule(s) Inhalation daily  tobramycin for Nebulization 300 milliGRAM(s) Inhalation every 12 hours  venlafaxine XR. 37.5 milliGRAM(s) Oral daily    MEDICATIONS  (PRN):  acetaminophen   Tablet .. 650 milliGRAM(s) Oral every 6 hours PRN Mild Pain (1 - 3)      LABS:                        11.6   7.65  )-----------( 196      ( 06 Apr 2021 07:54 )             36.4     Hgb Trend: 11.6<--, 11.4<--, 11.1<--, 10.8<--, 11.4<--  04-06    131<L>  |  93<L>  |  20  ----------------------------<  312<H>  4.5   |  33<H>  |  0.49<L>    Ca    8.9      06 Apr 2021 07:54  Phos  2.6     04-06  Mg     2.2     04-06    TPro  x   /  Alb  x   /  TBili  0.2  /  DBili  x   /  AST  x   /  ALT  x   /  AlkPhos  x   04-06    Creatinine Trend: 0.49<--, 0.42<--, 0.45<--, 0.38<--, 0.38<--, 0.45<--          MICROBIOLOGY:     RADIOLOGY:  [ ] Reviewed and interpreted by me   Interval Events: Patient was seen and examined at bedside. No overnight events.  Still with cough and sputum production with is minimally improved. Having Pseudomonas in the sputum cx. Was down to 1 L NC.     REVIEW OF SYSTEMS:  Constitutional: [ ] fevers [ ] chills [ ] weight loss [ ] weight gain  CV: [ ] chest pain [ ] orthopnea [ ] palpitations [ ] murmur  Resp: [x ] cough [ x] shortness of breath [ ] dyspnea [ ] wheezing [x ] sputum [ ] hemoptysis  [ ] All other systems negative  [ ] Unable to assess ROS because ________    OBJECTIVE:  ICU Vital Signs Last 24 Hrs  T(C): 36.9 (06 Apr 2021 09:43), Max: 36.9 (06 Apr 2021 09:43)  T(F): 98.4 (06 Apr 2021 09:43), Max: 98.4 (06 Apr 2021 09:43)  HR: 68 (06 Apr 2021 09:46) (67 - 78)  BP: 139/73 (06 Apr 2021 09:43) (114/54 - 146/76)  BP(mean): --  ABP: --  ABP(mean): --  RR: 16 (06 Apr 2021 09:43) (16 - 17)  SpO2: 97% (06 Apr 2021 09:46) (97% - 100%)        04-05 @ 07:01  -  04-06 @ 07:00  --------------------------------------------------------  IN: 440 mL / OUT: 0 mL / NET: 440 mL      CAPILLARY BLOOD GLUCOSE      POCT Blood Glucose.: 526 mg/dL (06 Apr 2021 11:54)      PHYSICAL EXAM:  PHYSICAL EXAM:  GENERAL: NAD, thinly built female  CHEST/LUNG: b/l Rhonchi+  HEART: Regular rate and rhythm; No murmurs  ABDOMEN: Soft, Nontender, Nondistended  EXTREMITIES: no LE edema  PSYCH: Calm  NEUROLOGY: AAOx3  SKIN: No rashes or lesions        HOSPITAL MEDICATIONS:  MEDICATIONS  (STANDING):  ALBUTerol    90 MICROgram(s) HFA Inhaler 1 Puff(s) Inhalation every 6 hours  albuterol/ipratropium for Nebulization 3 milliLiter(s) Nebulizer every 12 hours  benzonatate 100 milliGRAM(s) Oral three times a day  buDESOnide    Inhalation Suspension 0.5 milliGRAM(s) Inhalation every 12 hours  calcium carbonate 1250 mG  + Vitamin D (OsCal 500 + D) 1 Tablet(s) Oral daily  cyanocobalamin Injectable 1000 MICROGram(s) IntraMuscular <User Schedule>  dextrose 40% Gel 15 Gram(s) Oral once  dextrose 5%. 1000 milliLiter(s) (50 mL/Hr) IV Continuous <Continuous>  dextrose 5%. 1000 milliLiter(s) (100 mL/Hr) IV Continuous <Continuous>  dextrose 50% Injectable 25 Gram(s) IV Push once  dextrose 50% Injectable 12.5 Gram(s) IV Push once  dextrose 50% Injectable 25 Gram(s) IV Push once  enoxaparin Injectable 40 milliGRAM(s) SubCutaneous daily  ferrous    sulfate 325 milliGRAM(s) Oral daily  fluticasone propionate 50 MICROgram(s)/spray Nasal Spray 1 Spray(s) Both Nostrils <User Schedule>  glucagon  Injectable 1 milliGRAM(s) IntraMuscular once  influenza   Vaccine 0.5 milliLiter(s) IntraMuscular once  insulin glargine Injectable (LANTUS) 22 Unit(s) SubCutaneous at bedtime  insulin lispro (ADMELOG) corrective regimen sliding scale   SubCutaneous three times a day before meals  insulin lispro (ADMELOG) corrective regimen sliding scale   SubCutaneous at bedtime  insulin lispro Injectable (ADMELOG) 5 Unit(s) SubCutaneous three times a day before meals  montelukast 10 milliGRAM(s) Oral daily  nystatin    Suspension 747067 Unit(s) Swish and Swallow four times a day  pantoprazole    Tablet 40 milliGRAM(s) Oral before breakfast  piperacillin/tazobactam IVPB.. 3.375 Gram(s) IV Intermittent every 8 hours  polyethylene glycol 3350 17 Gram(s) Oral daily  predniSONE   Tablet 20 milliGRAM(s) Oral two times a day  senna 2 Tablet(s) Oral at bedtime  tiotropium 18 MICROgram(s) Capsule 1 Capsule(s) Inhalation daily  tobramycin for Nebulization 300 milliGRAM(s) Inhalation every 12 hours  venlafaxine XR. 37.5 milliGRAM(s) Oral daily    MEDICATIONS  (PRN):  acetaminophen   Tablet .. 650 milliGRAM(s) Oral every 6 hours PRN Mild Pain (1 - 3)      LABS:                        11.6   7.65  )-----------( 196      ( 06 Apr 2021 07:54 )             36.4     Hgb Trend: 11.6<--, 11.4<--, 11.1<--, 10.8<--, 11.4<--  04-06    131<L>  |  93<L>  |  20  ----------------------------<  312<H>  4.5   |  33<H>  |  0.49<L>    Ca    8.9      06 Apr 2021 07:54  Phos  2.6     04-06  Mg     2.2     04-06    TPro  x   /  Alb  x   /  TBili  0.2  /  DBili  x   /  AST  x   /  ALT  x   /  AlkPhos  x   04-06    Creatinine Trend: 0.49<--, 0.42<--, 0.45<--, 0.38<--, 0.38<--, 0.45<--          MICROBIOLOGY:     RADIOLOGY:  [ ] Reviewed and interpreted by me

## 2021-04-06 NOTE — PROGRESS NOTE ADULT - PROBLEM SELECTOR PLAN 2
Pt started taking prednisone 20mg BID on her own about two weeks ago.  - c/w prednisone 20mg PO BID for now, f/u pulm  dry mouth - trial Nystatin, encourage PO fluids  c/o throat pain no thrush noted, swallow eval pending

## 2021-04-06 NOTE — PROGRESS NOTE ADULT - PROBLEM SELECTOR PLAN 1
Leukocytosis and tachypnea. Prior sputum cx's grew HIB and Pseudomonas. Alternates between inhaled tobra and azithromycin but pt having fevers, worsening dyspnea and cough w/ increased sputum production concerning for Pseudomonas pneumonia.  - Lactate initially 1.1,  Received Zosyn x2 in the ED.   - c/w Zosyn 3.375g q8h and  inhaled tobra D#6  - BCx x2 NGTD.    - c/w nebulized budesonide, PRN albuterol nebs, and inhaled hypertonic saline.   - c/w Chest PT  - f/u pulm recs

## 2021-04-06 NOTE — SWALLOW BEDSIDE ASSESSMENT ADULT - SWALLOW EVAL: DIAGNOSIS
Pt presents with overtly functional oral and pharyngeal stages of swallow. Oral stage was marked by adequate acceptance, oral containment, mastication efficiency of solids, adequate AP transport and oral clearance. Pharyngeal stage was assessed via digital palpation and marked by +prompt swallow trigger and +hyolaryngeal elevation. No overt s/s of penetration/aspiration demonstrated. Given pt report of throat pain, would recommend soft solid diet.

## 2021-04-07 LAB
ALBUMIN SERPL ELPH-MCNC: 3 G/DL — LOW (ref 3.3–5)
ALP SERPL-CCNC: 174 U/L — HIGH (ref 40–120)
ALT FLD-CCNC: 95 U/L — HIGH (ref 4–33)
ANION GAP SERPL CALC-SCNC: 5 MMOL/L — LOW (ref 7–14)
AST SERPL-CCNC: 65 U/L — HIGH (ref 4–32)
BILIRUB SERPL-MCNC: 0.2 MG/DL — SIGNIFICANT CHANGE UP (ref 0.2–1.2)
BUN SERPL-MCNC: 14 MG/DL — SIGNIFICANT CHANGE UP (ref 7–23)
CALCIUM SERPL-MCNC: 8.9 MG/DL — SIGNIFICANT CHANGE UP (ref 8.4–10.5)
CHLORIDE SERPL-SCNC: 96 MMOL/L — LOW (ref 98–107)
CO2 SERPL-SCNC: 33 MMOL/L — HIGH (ref 22–31)
CREAT SERPL-MCNC: 0.53 MG/DL — SIGNIFICANT CHANGE UP (ref 0.5–1.3)
GLUCOSE BLDC GLUCOMTR-MCNC: 105 MG/DL — HIGH (ref 70–99)
GLUCOSE BLDC GLUCOMTR-MCNC: 106 MG/DL — HIGH (ref 70–99)
GLUCOSE BLDC GLUCOMTR-MCNC: 161 MG/DL — HIGH (ref 70–99)
GLUCOSE BLDC GLUCOMTR-MCNC: 165 MG/DL — HIGH (ref 70–99)
GLUCOSE BLDC GLUCOMTR-MCNC: 173 MG/DL — HIGH (ref 70–99)
GLUCOSE BLDC GLUCOMTR-MCNC: 204 MG/DL — HIGH (ref 70–99)
GLUCOSE BLDC GLUCOMTR-MCNC: 250 MG/DL — HIGH (ref 70–99)
GLUCOSE BLDC GLUCOMTR-MCNC: 48 MG/DL — CRITICAL LOW (ref 70–99)
GLUCOSE BLDC GLUCOMTR-MCNC: 62 MG/DL — LOW (ref 70–99)
GLUCOSE BLDC GLUCOMTR-MCNC: 70 MG/DL — SIGNIFICANT CHANGE UP (ref 70–99)
GLUCOSE BLDC GLUCOMTR-MCNC: 85 MG/DL — SIGNIFICANT CHANGE UP (ref 70–99)
GLUCOSE BLDC GLUCOMTR-MCNC: 98 MG/DL — SIGNIFICANT CHANGE UP (ref 70–99)
GLUCOSE SERPL-MCNC: 239 MG/DL — HIGH (ref 70–99)
HCT VFR BLD CALC: 38.8 % — SIGNIFICANT CHANGE UP (ref 34.5–45)
HGB BLD-MCNC: 12.1 G/DL — SIGNIFICANT CHANGE UP (ref 11.5–15.5)
MAGNESIUM SERPL-MCNC: 2.2 MG/DL — SIGNIFICANT CHANGE UP (ref 1.6–2.6)
MCHC RBC-ENTMCNC: 27.3 PG — SIGNIFICANT CHANGE UP (ref 27–34)
MCHC RBC-ENTMCNC: 31.2 GM/DL — LOW (ref 32–36)
MCV RBC AUTO: 87.4 FL — SIGNIFICANT CHANGE UP (ref 80–100)
NRBC # BLD: 0 /100 WBCS — SIGNIFICANT CHANGE UP
NRBC # FLD: 0 K/UL — SIGNIFICANT CHANGE UP
PHOSPHATE SERPL-MCNC: 2.9 MG/DL — SIGNIFICANT CHANGE UP (ref 2.5–4.5)
PLATELET # BLD AUTO: 201 K/UL — SIGNIFICANT CHANGE UP (ref 150–400)
POTASSIUM SERPL-MCNC: 3.8 MMOL/L — SIGNIFICANT CHANGE UP (ref 3.5–5.3)
POTASSIUM SERPL-SCNC: 3.8 MMOL/L — SIGNIFICANT CHANGE UP (ref 3.5–5.3)
PROT SERPL-MCNC: 6.3 G/DL — SIGNIFICANT CHANGE UP (ref 6–8.3)
RBC # BLD: 4.44 M/UL — SIGNIFICANT CHANGE UP (ref 3.8–5.2)
RBC # FLD: 12.9 % — SIGNIFICANT CHANGE UP (ref 10.3–14.5)
SODIUM SERPL-SCNC: 134 MMOL/L — LOW (ref 135–145)
WBC # BLD: 9.32 K/UL — SIGNIFICANT CHANGE UP (ref 3.8–10.5)
WBC # FLD AUTO: 9.32 K/UL — SIGNIFICANT CHANGE UP (ref 3.8–10.5)

## 2021-04-07 PROCEDURE — 99222 1ST HOSP IP/OBS MODERATE 55: CPT | Mod: GC

## 2021-04-07 PROCEDURE — 99233 SBSQ HOSP IP/OBS HIGH 50: CPT

## 2021-04-07 RX ORDER — INSULIN GLARGINE 100 [IU]/ML
22 INJECTION, SOLUTION SUBCUTANEOUS AT BEDTIME
Refills: 0 | Status: DISCONTINUED | OUTPATIENT
Start: 2021-04-07 | End: 2021-04-14

## 2021-04-07 RX ORDER — INSULIN LISPRO 100/ML
5 VIAL (ML) SUBCUTANEOUS
Refills: 0 | Status: DISCONTINUED | OUTPATIENT
Start: 2021-04-07 | End: 2021-04-15

## 2021-04-07 RX ORDER — DEXTROSE 50 % IN WATER 50 %
25 SYRINGE (ML) INTRAVENOUS ONCE
Refills: 0 | Status: DISCONTINUED | OUTPATIENT
Start: 2021-04-07 | End: 2021-04-18

## 2021-04-07 RX ADMIN — DIPHENHYDRAMINE HYDROCHLORIDE AND LIDOCAINE HYDROCHLORIDE AND ALUMINUM HYDROXIDE AND MAGNESIUM HYDRO 10 MILLILITER(S): KIT at 05:54

## 2021-04-07 RX ADMIN — Medication 1 TABLET(S): at 12:07

## 2021-04-07 RX ADMIN — INSULIN GLARGINE 22 UNIT(S): 100 INJECTION, SOLUTION SUBCUTANEOUS at 22:56

## 2021-04-07 RX ADMIN — PREGABALIN 1000 MICROGRAM(S): 225 CAPSULE ORAL at 12:00

## 2021-04-07 RX ADMIN — SENNA PLUS 2 TABLET(S): 8.6 TABLET ORAL at 22:09

## 2021-04-07 RX ADMIN — SODIUM CHLORIDE 4 MILLILITER(S): 9 INJECTION INTRAMUSCULAR; INTRAVENOUS; SUBCUTANEOUS at 10:27

## 2021-04-07 RX ADMIN — MONTELUKAST 10 MILLIGRAM(S): 4 TABLET, CHEWABLE ORAL at 12:02

## 2021-04-07 RX ADMIN — SODIUM CHLORIDE 4 MILLILITER(S): 9 INJECTION INTRAMUSCULAR; INTRAVENOUS; SUBCUTANEOUS at 23:13

## 2021-04-07 RX ADMIN — Medication 3 MILLILITER(S): at 10:26

## 2021-04-07 RX ADMIN — ENOXAPARIN SODIUM 40 MILLIGRAM(S): 100 INJECTION SUBCUTANEOUS at 12:00

## 2021-04-07 RX ADMIN — Medication 325 MILLIGRAM(S): at 12:07

## 2021-04-07 RX ADMIN — DIPHENHYDRAMINE HYDROCHLORIDE AND LIDOCAINE HYDROCHLORIDE AND ALUMINUM HYDROXIDE AND MAGNESIUM HYDRO 10 MILLILITER(S): KIT at 16:23

## 2021-04-07 RX ADMIN — Medication 300 MILLIGRAM(S): at 10:26

## 2021-04-07 RX ADMIN — Medication 8 UNIT(S): at 08:39

## 2021-04-07 RX ADMIN — Medication 2: at 08:38

## 2021-04-07 RX ADMIN — Medication 500000 UNIT(S): at 22:09

## 2021-04-07 RX ADMIN — Medication 500000 UNIT(S): at 16:22

## 2021-04-07 RX ADMIN — Medication 5 UNIT(S): at 12:15

## 2021-04-07 RX ADMIN — Medication 500000 UNIT(S): at 12:14

## 2021-04-07 RX ADMIN — SODIUM CHLORIDE 4 MILLILITER(S): 9 INJECTION INTRAMUSCULAR; INTRAVENOUS; SUBCUTANEOUS at 15:48

## 2021-04-07 RX ADMIN — Medication 1: at 12:16

## 2021-04-07 RX ADMIN — Medication 300 MILLIGRAM(S): at 23:13

## 2021-04-07 RX ADMIN — BUDESONIDE AND FORMOTEROL FUMARATE DIHYDRATE 2 PUFF(S): 160; 4.5 AEROSOL RESPIRATORY (INHALATION) at 22:10

## 2021-04-07 RX ADMIN — Medication 100 MILLIGRAM(S): at 16:22

## 2021-04-07 RX ADMIN — PIPERACILLIN AND TAZOBACTAM 25 GRAM(S): 4; .5 INJECTION, POWDER, LYOPHILIZED, FOR SOLUTION INTRAVENOUS at 03:26

## 2021-04-07 RX ADMIN — Medication 100 MILLIGRAM(S): at 05:55

## 2021-04-07 RX ADMIN — DIPHENHYDRAMINE HYDROCHLORIDE AND LIDOCAINE HYDROCHLORIDE AND ALUMINUM HYDROXIDE AND MAGNESIUM HYDRO 10 MILLILITER(S): KIT at 22:10

## 2021-04-07 RX ADMIN — PANTOPRAZOLE SODIUM 40 MILLIGRAM(S): 20 TABLET, DELAYED RELEASE ORAL at 05:55

## 2021-04-07 RX ADMIN — Medication 3 MILLILITER(S): at 03:44

## 2021-04-07 RX ADMIN — Medication 500000 UNIT(S): at 05:54

## 2021-04-07 RX ADMIN — Medication 37.5 MILLIGRAM(S): at 12:07

## 2021-04-07 RX ADMIN — BENZOCAINE AND MENTHOL 1 LOZENGE: 5; 1 LIQUID ORAL at 12:00

## 2021-04-07 RX ADMIN — PIPERACILLIN AND TAZOBACTAM 25 GRAM(S): 4; .5 INJECTION, POWDER, LYOPHILIZED, FOR SOLUTION INTRAVENOUS at 17:55

## 2021-04-07 RX ADMIN — BENZOCAINE AND MENTHOL 1 LOZENGE: 5; 1 LIQUID ORAL at 22:12

## 2021-04-07 RX ADMIN — Medication 1 SPRAY(S): at 12:01

## 2021-04-07 RX ADMIN — BENZOCAINE AND MENTHOL 1 LOZENGE: 5; 1 LIQUID ORAL at 05:56

## 2021-04-07 RX ADMIN — BENZOCAINE AND MENTHOL 1 LOZENGE: 5; 1 LIQUID ORAL at 17:56

## 2021-04-07 RX ADMIN — PIPERACILLIN AND TAZOBACTAM 25 GRAM(S): 4; .5 INJECTION, POWDER, LYOPHILIZED, FOR SOLUTION INTRAVENOUS at 10:00

## 2021-04-07 RX ADMIN — BUDESONIDE AND FORMOTEROL FUMARATE DIHYDRATE 2 PUFF(S): 160; 4.5 AEROSOL RESPIRATORY (INHALATION) at 08:39

## 2021-04-07 RX ADMIN — Medication 3 MILLILITER(S): at 23:12

## 2021-04-07 RX ADMIN — Medication 3 MILLILITER(S): at 15:48

## 2021-04-07 RX ADMIN — Medication 100 MILLIGRAM(S): at 22:09

## 2021-04-07 NOTE — DIETITIAN INITIAL EVALUATION ADULT. - PROBLEM SELECTOR PLAN 2
Pt started taking prednisone 20mg BID on her own about two weeks ago.  - c/w prednisone 20mg PO BID then taper to prevent adrenal insufficiency  - Pulm consult in the AM

## 2021-04-07 NOTE — DIETITIAN INITIAL EVALUATION ADULT. - OTHER INFO
Patient reports good appetite and po intake at this time. Observed completed tray at bedside. Patient reports she likes Fish and Chicken. S/p swallow bedside 4/6 Soft Solids with Thin Liquids. Patient currently ordered for Dysphagia 3-Soft Thin liquids. RD reviewed cultural foods within therapeutic diet which she was understanding of. RD provided the patient with extensive verbal and written DM diet education; including, carb counting, label reading, portion size, meal planning, pre-prandial and post-prandial finger stick goals, and HbA1c goal.

## 2021-04-07 NOTE — DIETITIAN INITIAL EVALUATION ADULT. - PERTINENT MEDS FT
ALBUTerol    90 MICROgram(s) HFA Inhaler  albuterol/ipratropium for Nebulization  benzonatate  budesonide 160 MICROgram(s)/formoterol 4.5 MICROgram(s) Inhaler  calcium carbonate 1250 mG  + Vitamin D (OsCal 500 + D)  cyanocobalamin Injectable  dextrose 40% Gel  dextrose 5%.  dextrose 5%.  dextrose 50% Injectable  dextrose 50% Injectable  dextrose 50% Injectable  dextrose 50% Injectable  ferrous    sulfate  glucagon  Injectable  insulin glargine Injectable (LANTUS)  insulin lispro (ADMELOG) corrective regimen sliding scale  insulin lispro (ADMELOG) corrective regimen sliding scale  insulin lispro Injectable (ADMELOG)  montelukast  pantoprazole    Tablet  polyethylene glycol 3350  predniSONE   Tablet  senna  sodium chloride 7% Inhalation  tiotropium 18 MICROgram(s) Capsule  venlafaxine XR.

## 2021-04-07 NOTE — PROGRESS NOTE ADULT - PROBLEM SELECTOR PLAN 2
Pt started taking prednisone 20mg BID on her own about two weeks ago.  -  prednisone decreased to daily dosing now per pulm recs  dry mouth - trial Nystatin, encourage PO fluids  c/o throat pain no thrush noted

## 2021-04-07 NOTE — DIETITIAN INITIAL EVALUATION ADULT. - PERTINENT LABORATORY DATA
04-07 Na134 mmol/L<L> Glu 239 mg/dL<H> K+ 3.8 mmol/L Cr  0.53 mg/dL BUN 14 mg/dL 04-07 Phos 2.9 mg/dL 04-07 Alb 3.0 g/dL<L>

## 2021-04-07 NOTE — CONSULT NOTE ADULT - SUBJECTIVE AND OBJECTIVE BOX
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          COVID 19: Negative      Prior EGD:   Prior Colonoscopy:      PAST MEDICAL & SURGICAL HISTORY:  ABPA (allergic bronchopulmonary aspergillosis)    Bronchiectasis    Hypertension    Vitamin D deficiency    Microcytic anemia    GERD (gastroesophageal reflux disease)    Postnasal drip    Pseudomonas aeruginosa colonization    Allergic rhinitis    Recurrent pneumonia    Type 2 diabetes mellitus, with long-term current use of insulin    History of cholecystectomy        FAMILY HISTORY:  Family history of diabetes mellitus  father    Family history of allergic rhinitis (Child, Child)  son, daughter    Family history of bronchitis  parents        Social History:  No IV drug abuse.    Home Medications:  Admelog SoloStar 100 units/mL injectable solution: 2 unit(s) injectable 3 times a day (before meals) (01 Apr 2021 14:24)  albuterol 2.5 mg/3 mL (0.083%) inhalation solution: 3 milliliter(s) inhaled every 6 hours, As Needed (01 Apr 2021 14:24)  albuterol 90 mcg/inh inhalation aerosol: 1 to 2 puff(s) inhaled every 4 to 6 hours, As Needed (01 Apr 2021 14:24)  amLODIPine 5 mg oral tablet: 1 tab(s) orally once a day (01 Apr 2021 14:24)  Basaglar KwikPen 100 units/mL subcutaneous solution: 10 unit(s) subcutaneous once a day (at bedtime) (01 Apr 2021 14:24)  benzonatate 100 mg oral capsule: 1 cap(s) orally 3 times a day (01 Apr 2021 14:24)  bisacodyl 5 mg oral delayed release tablet: 1 tab(s) orally 2 times a day, As Needed (01 Apr 2021 14:24)  budesonide 0.5 mg/2 mL inhalation suspension: 2 milliliter(s) inhaled every 12 hours (01 Apr 2021 14:24)  Calcium 600+D oral tablet: 1 tab(s) orally 2 times a day (01 Apr 2021 14:24)  cyanocobalamin 1000 mcg/mL injectable solution: 1000 microgram(s) injectable every 7 days on Wednesday (01 Apr 2021 14:24)  ferrous gluconate 324 mg (37.5 mg elemental iron) oral tablet: 1 tab(s) orally 2 times a day (01 Apr 2021 14:24)  Flonase 50 mcg/inh nasal spray: 1 spray(s) in each nostril once a day (01 Apr 2021 14:24)  levoFLOXacin 500 mg oral tablet: 1 tab(s) orally every 24 hoursx7 days (01 Apr 2021 14:24)  metFORMIN 1000 mg oral tablet: 1 tab(s) orally 2 times a day (01 Apr 2021 14:24)  montelukast 10 mg oral tablet: 1 tab(s) orally once a day (01 Apr 2021 14:24)  NebuSal: 4 milliliter(s) inhaled 2 times a day (01 Apr 2021 14:24)  omeprazole 20 mg oral delayed release capsule: 1 cap(s) orally once a day (01 Apr 2021 14:24)  Perforomist 20 mcg/2 mL inhalation solution: 2 milliliter(s) inhaled every 12 hours (01 Apr 2021 14:24)  predniSONE 10 mg oral tablet: 1 tab(s) orally 2 times a day (01 Apr 2021 14:24)  Senna 8.6 mg oral tablet: 1 tab(s) orally 2 times a day (01 Apr 2021 14:24)  tobramycin 75 mg/mL inhalation solution: 4 milliliter(s) inhaled 2 times a day (01 Apr 2021 14:24)  Vitamin C 500 mg oral tablet: 1 tab(s) orally once a day (01 Apr 2021 14:24)  Vitamin D2 50,000 intl units (1.25 mg) oral capsule: 1 cap(s) orally once a week (01 Apr 2021 14:24)    MEDICATIONS  (STANDING):  ALBUTerol    90 MICROgram(s) HFA Inhaler 1 Puff(s) Inhalation every 6 hours  albuterol/ipratropium for Nebulization 3 milliLiter(s) Nebulizer every 6 hours  benzocaine 15 mG/menthol 3.6 mG (Sugar-Free) Lozenge 1 Lozenge Oral every 6 hours  benzonatate 100 milliGRAM(s) Oral three times a day  budesonide 160 MICROgram(s)/formoterol 4.5 MICROgram(s) Inhaler 2 Puff(s) Inhalation two times a day  calcium carbonate 1250 mG  + Vitamin D (OsCal 500 + D) 1 Tablet(s) Oral daily  cyanocobalamin Injectable 1000 MICROGram(s) IntraMuscular <User Schedule>  dextrose 40% Gel 15 Gram(s) Oral once  dextrose 5%. 1000 milliLiter(s) (50 mL/Hr) IV Continuous <Continuous>  dextrose 5%. 1000 milliLiter(s) (100 mL/Hr) IV Continuous <Continuous>  dextrose 50% Injectable 25 Gram(s) IV Push once  dextrose 50% Injectable 12.5 Gram(s) IV Push once  dextrose 50% Injectable 25 Gram(s) IV Push once  enoxaparin Injectable 40 milliGRAM(s) SubCutaneous daily  ferrous    sulfate 325 milliGRAM(s) Oral daily  FIRST- Mouthwash  BLM 10 milliLiter(s) Swish and Spit every 8 hours  fluticasone propionate 50 MICROgram(s)/spray Nasal Spray 1 Spray(s) Both Nostrils <User Schedule>  glucagon  Injectable 1 milliGRAM(s) IntraMuscular once  influenza   Vaccine 0.5 milliLiter(s) IntraMuscular once  insulin glargine Injectable (LANTUS) 30 Unit(s) SubCutaneous at bedtime  insulin lispro (ADMELOG) corrective regimen sliding scale   SubCutaneous three times a day before meals  insulin lispro (ADMELOG) corrective regimen sliding scale   SubCutaneous at bedtime  insulin lispro Injectable (ADMELOG) 8 Unit(s) SubCutaneous three times a day before meals  montelukast 10 milliGRAM(s) Oral daily  nystatin    Suspension 509800 Unit(s) Swish and Swallow four times a day  pantoprazole    Tablet 40 milliGRAM(s) Oral before breakfast  piperacillin/tazobactam IVPB.. 3.375 Gram(s) IV Intermittent every 8 hours  polyethylene glycol 3350 17 Gram(s) Oral daily  predniSONE   Tablet 20 milliGRAM(s) Oral daily  senna 2 Tablet(s) Oral at bedtime  sodium chloride 7% Inhalation 4 milliLiter(s) Inhalation every 8 hours  tiotropium 18 MICROgram(s) Capsule 1 Capsule(s) Inhalation daily  tobramycin for Nebulization 300 milliGRAM(s) Inhalation every 12 hours  venlafaxine XR. 37.5 milliGRAM(s) Oral daily    MEDICATIONS  (PRN):  acetaminophen   Tablet .. 650 milliGRAM(s) Oral every 6 hours PRN Mild Pain (1 - 3)      Allergies  No Known Allergies      Review of Systems:   Constitutional: Fever  ENT/Mouth:  No Hearing Changes,  No Difficulty Swallowing  Eyes:  No Eye Pain, No Vision Changes  Cardiovascular:  No Chest Pain, No Palpitations  Respiratory:  Dyspnea  Gastrointestinal:  As described in HPI  Musculoskeletal:  No Joint Swelling, No Back Pain  Skin:  No Skin Lesions, No Jaundice  Neuro:  No Syncope, No Dizziness  Heme/Lymph:  No Bruising, No Bleeding.          Physical Examination:  T(C): 36.9 (04-07-21 @ 06:01), Max: 36.9 (04-06-21 @ 09:43)  HR: 66 (04-07-21 @ 06:01) (66 - 80)  BP: 143/69 (04-07-21 @ 06:01) (129/71 - 143/69)  RR: 17 (04-07-21 @ 06:01) (16 - 18)  SpO2: 100% (04-07-21 @ 06:01) (94% - 100%)      04-05-21 @ 07:01  -  04-06-21 @ 07:00  --------------------------------------------------------  IN: 440 mL / OUT: 0 mL / NET: 440 mL    04-06-21 @ 07:01  -  04-07-21 @ 07:00  --------------------------------------------------------  IN: 900 mL / OUT: 650 mL / NET: 250 mL        Constitutional: No acute distress.  Eyes:. Conjunctivae are clear, Sclera is non-icteric.  Ears Nose and Throat: The external ears are normal appearing,  Oral mucosa is pink and moist.  Respiratory:  On NC  Cardiovascular:  S1 S2, Regular rate and rhythm.  GI: Abdomen is soft, symmetric, and non-tender without distention.  Neuro: No Tremor, No involuntary movements  Skin: No rashes, No Jaundice.          Data: (reviewed by attending)                        12.1   9.32  )-----------( 201      ( 07 Apr 2021 06:58 )             38.8     Hgb Trend:  12.1  04-07-21 @ 06:58  11.6  04-06-21 @ 07:54  11.4  04-05-21 @ 07:06        04-07    134<L>  |  96<L>  |  14  ----------------------------<  239<H>  3.8   |  33<H>  |  0.53    Ca    8.9      07 Apr 2021 06:58  Phos  2.9     04-07  Mg     2.2     04-07    TPro  6.3  /  Alb  3.0<L>  /  TBili  0.2  /  DBili  x   /  AST  65<H>  /  ALT  95<H>  /  AlkPhos  174<H>  04-07    Liver panel trend:  TBili 0.2   /   AST 65   /   ALT 95   /   AlkP 174   /   Tptn 6.3   /   Alb 3.0    /   DBili --      04-07  TBili 0.2   /   AST --   /   ALT --   /   AlkP --   /   Tptn --   /   Alb --    /   DBili --      04-06  TBili 0.2   /   AST 63   /   ALT 92   /   AlkP 187   /   Tptn 6.2   /   Alb 3.0    /   DBili <0.2      04-06  TBili <0.2   /   AST 45   /   ALT 73   /   AlkP 187   /   Tptn 6.1   /   Alb 3.0    /   DBili --      04-05  TBili 0.2   /   AST 37   /   ALT 60   /   AlkP 181   /   Tptn 6.1   /   Alb 2.9    /   DBili --      04-04  TBili 0.2   /   AST 46   /   ALT 58   /   AlkP 195   /   Tptn 6.4   /   Alb 3.0    /   DBili --      04-03  TBili 0.3   /   AST 52   /   ALT 55   /   AlkP 202   /   Tptn 6.4   /   Alb 3.1    /   DBili --      04-02  TBili 0.3   /   AST 26   /   ALT 38   /   AlkP 184   /   Tptn 7.1   /   Alb 3.3    /   DBili --      04-01  TBili <0.2   /   AST 27   /   ALT 41   /   AlkP 189   /   Tptn 6.9   /   Alb 3.3    /   DBili --      03-31          Culture - Sputum (collected 04 Apr 2021 14:28)  Source: .Sputum Sputum  Gram Stain (04 Apr 2021 19:59):    Rare Squamous epithelial cells per low power field    Rare polymorphonuclear leukocytes per low power field    Rare Yeast like cells per oil power field    Rare Gram positive cocci in pairs per oil power field    Rare Gram Variable Rods per oil power field  Preliminary Report (06 Apr 2021 13:47):    Rare Pseudomonas aeruginosa    Normal Respiratory Denise present          Radiology:(reviewed by attending)    US Abdomen Limited:   EXAM:  US ABDOMEN LIMITED        PROCEDURE DATE:  Apr 6 2021         INTERPRETATION:  CLINICAL INFORMATION: Abnormal liver function tests.    COMPARISON: 1/17/2020    TECHNIQUE: Sonography of the abdomen.    FINDINGS:    Liver: Coarsening of the liver echotexture and surface nodularity consistent with cirrhosis, No focal liver lesions. Main, right, and left portal veins are patent, with normal directional flow.  Bile ducts: Normal caliber. Common bile duct measures 3 mm at the level of the rene hepatis and 8 mm at the level of the pancreatic head, likely postcholecystectomy change.  Gallbladder: Surgically removed  Pancreas: Visualized portions are within normal limits.  Right kidney: 9.1 cm. No hydronephrosis.  Ascites: None.  Aorta and IVC: Visualized portions are within normal limits.    IMPRESSION:  Cirrhotic liver.              MARGAUX ESCAMILLA MD; Attending Radiologist  This document has been electronically signed. 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 Patient says she was never told that she had problem with the liver, except the one in 1998. No abdominal distension, no abdominal pain, never had hematemesis. H/O blood transfusion in 1998. She never saw any liver doctor. She follows up with her PMD on telehealth     COVID 19: Negative      PAST MEDICAL & SURGICAL HISTORY:  ABPA (allergic bronchopulmonary aspergillosis)    Bronchiectasis    Hypertension    Vitamin D deficiency    Microcytic anemia    GERD (gastroesophageal reflux disease)    Postnasal drip    Pseudomonas aeruginosa colonization    Allergic rhinitis    Recurrent pneumonia    Type 2 diabetes mellitus, with long-term current use of insulin    History of cholecystectomy        FAMILY HISTORY:  Family history of diabetes mellitus  father    Family history of allergic rhinitis (Child, Child)  son, daughter    Family history of bronchitis  parents        Social History:  No IV drug abuse.    Home Medications:  Admelog SoloStar 100 units/mL injectable solution: 2 unit(s) injectable 3 times a day (before meals) (01 Apr 2021 14:24)  albuterol 2.5 mg/3 mL (0.083%) inhalation solution: 3 milliliter(s) inhaled every 6 hours, As Needed (01 Apr 2021 14:24)  albuterol 90 mcg/inh inhalation aerosol: 1 to 2 puff(s) inhaled every 4 to 6 hours, As Needed (01 Apr 2021 14:24)  amLODIPine 5 mg oral tablet: 1 tab(s) orally once a day (01 Apr 2021 14:24)  Basaglar KwikPen 100 units/mL subcutaneous solution: 10 unit(s) subcutaneous once a day (at bedtime) (01 Apr 2021 14:24)  benzonatate 100 mg oral capsule: 1 cap(s) orally 3 times a day (01 Apr 2021 14:24)  bisacodyl 5 mg oral delayed release tablet: 1 tab(s) orally 2 times a day, As Needed (01 Apr 2021 14:24)  budesonide 0.5 mg/2 mL inhalation suspension: 2 milliliter(s) inhaled every 12 hours (01 Apr 2021 14:24)  Calcium 600+D oral tablet: 1 tab(s) orally 2 times a day (01 Apr 2021 14:24)  cyanocobalamin 1000 mcg/mL injectable solution: 1000 microgram(s) injectable every 7 days on Wednesday (01 Apr 2021 14:24)  ferrous gluconate 324 mg (37.5 mg elemental iron) oral tablet: 1 tab(s) orally 2 times a day (01 Apr 2021 14:24)  Flonase 50 mcg/inh nasal spray: 1 spray(s) in each nostril once a day (01 Apr 2021 14:24)  levoFLOXacin 500 mg oral tablet: 1 tab(s) orally every 24 hoursx7 days (01 Apr 2021 14:24)  metFORMIN 1000 mg oral tablet: 1 tab(s) orally 2 times a day (01 Apr 2021 14:24)  montelukast 10 mg oral tablet: 1 tab(s) orally once a day (01 Apr 2021 14:24)  NebuSal: 4 milliliter(s) inhaled 2 times a day (01 Apr 2021 14:24)  omeprazole 20 mg oral delayed release capsule: 1 cap(s) orally once a day (01 Apr 2021 14:24)  Perforomist 20 mcg/2 mL inhalation solution: 2 milliliter(s) inhaled every 12 hours (01 Apr 2021 14:24)  predniSONE 10 mg oral tablet: 1 tab(s) orally 2 times a day (01 Apr 2021 14:24)  Senna 8.6 mg oral tablet: 1 tab(s) orally 2 times a day (01 Apr 2021 14:24)  tobramycin 75 mg/mL inhalation solution: 4 milliliter(s) inhaled 2 times a day (01 Apr 2021 14:24)  Vitamin C 500 mg oral tablet: 1 tab(s) orally once a day (01 Apr 2021 14:24)  Vitamin D2 50,000 intl units (1.25 mg) oral capsule: 1 cap(s) orally once a week (01 Apr 2021 14:24)    MEDICATIONS  (STANDING):  ALBUTerol    90 MICROgram(s) HFA Inhaler 1 Puff(s) Inhalation every 6 hours  albuterol/ipratropium for Nebulization 3 milliLiter(s) Nebulizer every 6 hours  benzocaine 15 mG/menthol 3.6 mG (Sugar-Free) Lozenge 1 Lozenge Oral every 6 hours  benzonatate 100 milliGRAM(s) Oral three times a day  budesonide 160 MICROgram(s)/formoterol 4.5 MICROgram(s) Inhaler 2 Puff(s) Inhalation two times a day  calcium carbonate 1250 mG  + Vitamin D (OsCal 500 + D) 1 Tablet(s) Oral daily  cyanocobalamin Injectable 1000 MICROGram(s) IntraMuscular <User Schedule>  dextrose 40% Gel 15 Gram(s) Oral once  dextrose 5%. 1000 milliLiter(s) (50 mL/Hr) IV Continuous <Continuous>  dextrose 5%. 1000 milliLiter(s) (100 mL/Hr) IV Continuous <Continuous>  dextrose 50% Injectable 25 Gram(s) IV Push once  dextrose 50% Injectable 12.5 Gram(s) IV Push once  dextrose 50% Injectable 25 Gram(s) IV Push once  enoxaparin Injectable 40 milliGRAM(s) SubCutaneous daily  ferrous    sulfate 325 milliGRAM(s) Oral daily  FIRST- Mouthwash  BLM 10 milliLiter(s) Swish and Spit every 8 hours  fluticasone propionate 50 MICROgram(s)/spray Nasal Spray 1 Spray(s) Both Nostrils <User Schedule>  glucagon  Injectable 1 milliGRAM(s) IntraMuscular once  influenza   Vaccine 0.5 milliLiter(s) IntraMuscular once  insulin glargine Injectable (LANTUS) 30 Unit(s) SubCutaneous at bedtime  insulin lispro (ADMELOG) corrective regimen sliding scale   SubCutaneous three times a day before meals  insulin lispro (ADMELOG) corrective regimen sliding scale   SubCutaneous at bedtime  insulin lispro Injectable (ADMELOG) 8 Unit(s) SubCutaneous three times a day before meals  montelukast 10 milliGRAM(s) Oral daily  nystatin    Suspension 762657 Unit(s) Swish and Swallow four times a day  pantoprazole    Tablet 40 milliGRAM(s) Oral before breakfast  piperacillin/tazobactam IVPB.. 3.375 Gram(s) IV Intermittent every 8 hours  polyethylene glycol 3350 17 Gram(s) Oral daily  predniSONE   Tablet 20 milliGRAM(s) Oral daily  senna 2 Tablet(s) Oral at bedtime  sodium chloride 7% Inhalation 4 milliLiter(s) Inhalation every 8 hours  tiotropium 18 MICROgram(s) Capsule 1 Capsule(s) Inhalation daily  tobramycin for Nebulization 300 milliGRAM(s) Inhalation every 12 hours  venlafaxine XR. 37.5 milliGRAM(s) Oral daily    MEDICATIONS  (PRN):  acetaminophen   Tablet .. 650 milliGRAM(s) Oral every 6 hours PRN Mild Pain (1 - 3)      Allergies  No Known Allergies      Review of Systems:   Constitutional: Fever  ENT/Mouth:  No Hearing Changes,  No Difficulty Swallowing  Eyes:  No Eye Pain, No Vision Changes  Cardiovascular:  No Chest Pain, No Palpitations  Respiratory:  Dyspnea+  Gastrointestinal:  As described in HPI  Musculoskeletal:  No Joint Swelling, No Back Pain  Skin:  No Skin Lesions, No Jaundice  Neuro:  No Syncope, No Dizziness  Heme/Lymph:  No Bruising, No Bleeding.          Physical Examination:  T(C): 36.9 (04-07-21 @ 06:01), Max: 36.9 (04-06-21 @ 09:43)  HR: 66 (04-07-21 @ 06:01) (66 - 80)  BP: 143/69 (04-07-21 @ 06:01) (129/71 - 143/69)  RR: 17 (04-07-21 @ 06:01) (16 - 18)  SpO2: 100% (04-07-21 @ 06:01) (94% - 100%)      04-05-21 @ 07:01  -  04-06-21 @ 07:00  --------------------------------------------------------  IN: 440 mL / OUT: 0 mL / NET: 440 mL    04-06-21 @ 07:01  -  04-07-21 @ 07:00  --------------------------------------------------------  IN: 900 mL / OUT: 650 mL / NET: 250 mL        Constitutional: No acute distress.  Eyes:. Conjunctivae are clear, Sclera is non-icteric.  Ears Nose and Throat: The external ears are normal appearing,  Oral mucosa is pink and moist.  Respiratory:  On NC oxygen  Cardiovascular:  S1 S2, Regular rate and rhythm.  GI: Abdomen is soft, symmetric, and non-tender without distention.  Neuro: No Tremor, No involuntary movements  Skin: No rashes, No Jaundice.          Data: (reviewed by attending)                        12.1   9.32  )-----------( 201      ( 07 Apr 2021 06:58 )             38.8     Hgb Trend:  12.1  04-07-21 @ 06:58  11.6  04-06-21 @ 07:54  11.4  04-05-21 @ 07:06        04-07    134<L>  |  96<L>  |  14  ----------------------------<  239<H>  3.8   |  33<H>  |  0.53    Ca    8.9      07 Apr 2021 06:58  Phos  2.9     04-07  Mg     2.2     04-07    TPro  6.3  /  Alb  3.0<L>  /  TBili  0.2  /  DBili  x   /  AST  65<H>  /  ALT  95<H>  /  AlkPhos  174<H>  04-07    Liver panel trend:  TBili 0.2   /   AST 65   /   ALT 95   /   AlkP 174   /   Tptn 6.3   /   Alb 3.0    /   DBili --      04-07  TBili 0.2   /   AST --   /   ALT --   /   AlkP --   /   Tptn --   /   Alb --    /   DBili --      04-06  TBili 0.2   /   AST 63   /   ALT 92   /   AlkP 187   /   Tptn 6.2   /   Alb 3.0    /   DBili <0.2      04-06  TBili <0.2   /   AST 45   /   ALT 73   /   AlkP 187   /   Tptn 6.1   /   Alb 3.0    /   DBili --      04-05  TBili 0.2   /   AST 37   /   ALT 60   /   AlkP 181   /   Tptn 6.1   /   Alb 2.9    /   DBili --      04-04  TBili 0.2   /   AST 46   /   ALT 58   /   AlkP 195   /   Tptn 6.4   /   Alb 3.0    /   DBili --      04-03  TBili 0.3   /   AST 52   /   ALT 55   /   AlkP 202   /   Tptn 6.4   /   Alb 3.1    /   DBili --      04-02  TBili 0.3   /   AST 26   /   ALT 38   /   AlkP 184   /   Tptn 7.1   /   Alb 3.3    /   DBili --      04-01  TBili <0.2   /   AST 27   /   ALT 41   /   AlkP 189   /   Tptn 6.9   /   Alb 3.3    /   DBili --      03-31          Culture - Sputum (collected 04 Apr 2021 14:28)  Source: .Sputum Sputum  Gram Stain (04 Apr 2021 19:59):    Rare Squamous epithelial cells per low power field    Rare polymorphonuclear leukocytes per low power field    Rare Yeast like cells per oil power field    Rare Gram positive cocci in pairs per oil power field    Rare Gram Variable Rods per oil power field  Preliminary Report (06 Apr 2021 13:47):    Rare Pseudomonas aeruginosa    Normal Respiratory Denise present          Radiology:(reviewed by attending)    US Abdomen Limited:   EXAM:  US ABDOMEN LIMITED        PROCEDURE DATE:  Apr 6 2021         INTERPRETATION:  CLINICAL INFORMATION: Abnormal liver function tests.    COMPARISON: 1/17/2020    TECHNIQUE: Sonography of the abdomen.    FINDINGS:    Liver: Coarsening of the liver echotexture and surface nodularity consistent with cirrhosis, No focal liver lesions. Main, right, and left portal veins are patent, with normal directional flow.  Bile ducts: Normal caliber. Common bile duct measures 3 mm at the level of the rene hepatis and 8 mm at the level of the pancreatic head, likely postcholecystectomy change.  Gallbladder: Surgically removed  Pancreas: Visualized portions are within normal limits.  Right kidney: 9.1 cm. No hydronephrosis.  Ascites: None.  Aorta and IVC: Visualized portions are within normal limits.    IMPRESSION:  Cirrhotic liver.              MARGAUX ESCAMILLA MD; Attending Radiologist  This document has been electronically signed. Apr 6 2021 10:13AM (04-06-21 @ 09:09)

## 2021-04-07 NOTE — PROGRESS NOTE ADULT - SUBJECTIVE AND OBJECTIVE BOX
Patient is a 66y old  Female who presents with a chief complaint of Pneumonia (07 Apr 2021 08:04)      SUBJECTIVE / OVERNIGHT EVENTS: Pt seen and examined at 11:45am, had hypoglycemia overnight, spoke through pt's daughter in law as per pt's request, still with productive cough, per pt feels anxious other wise no other complaints, no other new issues reported.        MEDICATIONS  (STANDING):  ALBUTerol    90 MICROgram(s) HFA Inhaler 1 Puff(s) Inhalation every 6 hours  albuterol/ipratropium for Nebulization 3 milliLiter(s) Nebulizer every 6 hours  benzocaine 15 mG/menthol 3.6 mG (Sugar-Free) Lozenge 1 Lozenge Oral every 6 hours  benzonatate 100 milliGRAM(s) Oral three times a day  budesonide 160 MICROgram(s)/formoterol 4.5 MICROgram(s) Inhaler 2 Puff(s) Inhalation two times a day  calcium carbonate 1250 mG  + Vitamin D (OsCal 500 + D) 1 Tablet(s) Oral daily  cyanocobalamin Injectable 1000 MICROGram(s) IntraMuscular <User Schedule>  dextrose 40% Gel 15 Gram(s) Oral once  dextrose 5%. 1000 milliLiter(s) (50 mL/Hr) IV Continuous <Continuous>  dextrose 5%. 1000 milliLiter(s) (100 mL/Hr) IV Continuous <Continuous>  dextrose 50% Injectable 25 Gram(s) IV Push once  dextrose 50% Injectable 12.5 Gram(s) IV Push once  dextrose 50% Injectable 25 Gram(s) IV Push once  dextrose 50% Injectable 25 Gram(s) IV Push once  enoxaparin Injectable 40 milliGRAM(s) SubCutaneous daily  ferrous    sulfate 325 milliGRAM(s) Oral daily  FIRST- Mouthwash  BLM 10 milliLiter(s) Swish and Spit every 8 hours  fluticasone propionate 50 MICROgram(s)/spray Nasal Spray 1 Spray(s) Both Nostrils <User Schedule>  glucagon  Injectable 1 milliGRAM(s) IntraMuscular once  influenza   Vaccine 0.5 milliLiter(s) IntraMuscular once  insulin glargine Injectable (LANTUS) 22 Unit(s) SubCutaneous at bedtime  insulin lispro (ADMELOG) corrective regimen sliding scale   SubCutaneous three times a day before meals  insulin lispro (ADMELOG) corrective regimen sliding scale   SubCutaneous at bedtime  insulin lispro Injectable (ADMELOG) 5 Unit(s) SubCutaneous three times a day before meals  montelukast 10 milliGRAM(s) Oral daily  nystatin    Suspension 517746 Unit(s) Swish and Swallow four times a day  pantoprazole    Tablet 40 milliGRAM(s) Oral before breakfast  piperacillin/tazobactam IVPB.. 3.375 Gram(s) IV Intermittent every 8 hours  polyethylene glycol 3350 17 Gram(s) Oral daily  predniSONE   Tablet 20 milliGRAM(s) Oral daily  senna 2 Tablet(s) Oral at bedtime  sodium chloride 7% Inhalation 4 milliLiter(s) Inhalation every 8 hours  tiotropium 18 MICROgram(s) Capsule 1 Capsule(s) Inhalation daily  tobramycin for Nebulization 300 milliGRAM(s) Inhalation every 12 hours  venlafaxine XR. 37.5 milliGRAM(s) Oral daily    MEDICATIONS  (PRN):  acetaminophen   Tablet .. 650 milliGRAM(s) Oral every 6 hours PRN Mild Pain (1 - 3)      Vital Signs Last 24 Hrs  T(C): 37.2 (07 Apr 2021 09:27), Max: 37.2 (07 Apr 2021 09:27)  T(F): 99 (07 Apr 2021 09:27), Max: 99 (07 Apr 2021 09:27)  HR: 81 (07 Apr 2021 10:30) (66 - 84)  BP: 133/69 (07 Apr 2021 09:27) (129/71 - 143/69)  BP(mean): --  RR: 18 (07 Apr 2021 09:27) (17 - 18)  SpO2: 100% (07 Apr 2021 10:30) (94% - 100%)  CAPILLARY BLOOD GLUCOSE      POCT Blood Glucose.: 161 mg/dL (07 Apr 2021 12:15)  POCT Blood Glucose.: 106 mg/dL (07 Apr 2021 11:08)  POCT Blood Glucose.: 105 mg/dL (07 Apr 2021 11:07)  POCT Blood Glucose.: 85 mg/dL (07 Apr 2021 10:51)  POCT Blood Glucose.: 48 mg/dL (07 Apr 2021 10:43)  POCT Blood Glucose.: 204 mg/dL (07 Apr 2021 08:36)  POCT Blood Glucose.: 173 mg/dL (07 Apr 2021 03:40)  POCT Blood Glucose.: 165 mg/dL (07 Apr 2021 03:38)  POCT Blood Glucose.: 70 mg/dL (07 Apr 2021 03:21)  POCT Blood Glucose.: 62 mg/dL (07 Apr 2021 03:19)  POCT Blood Glucose.: 174 mg/dL (06 Apr 2021 21:28)  POCT Blood Glucose.: 173 mg/dL (06 Apr 2021 17:40)  POCT Blood Glucose.: 349 mg/dL (06 Apr 2021 14:17)    I&O's Summary    06 Apr 2021 07:01  -  07 Apr 2021 07:00  --------------------------------------------------------  IN: 900 mL / OUT: 650 mL / NET: 250 mL        PHYSICAL EXAM:  GENERAL: NAD, thinly built female  HEENT: no thrush  CHEST/LUNG: +rhonchi, coughs upon deep breath  HEART: Regular rate and rhythm; No murmurs  ABDOMEN: Soft, Nontender, Nondistended  EXTREMITIES: no LE edema  PSYCH: anxious  NEUROLOGY: AAOx3  SKIN: No rashes or lesions      LABS:                        12.1   9.32  )-----------( 201      ( 07 Apr 2021 06:58 )             38.8     04-07    134<L>  |  96<L>  |  14  ----------------------------<  239<H>  3.8   |  33<H>  |  0.53    Ca    8.9      07 Apr 2021 06:58  Phos  2.9     04-07  Mg     2.2     04-07    TPro  6.3  /  Alb  3.0<L>  /  TBili  0.2  /  DBili  x   /  AST  65<H>  /  ALT  95<H>  /  AlkPhos  174<H>  04-07              RADIOLOGY & ADDITIONAL TESTS:    Imaging Personally Reviewed:    Consultant(s) Notes Reviewed:      Care Discussed with Consultants/Other Providers:

## 2021-04-07 NOTE — CONSULT NOTE ADULT - ASSESSMENT
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  She had H/O mild elevation of liver enzymes before since 2018.  , US abdomen shows nodular liver, no ascites, spleen not mentioned.  No alcohol use.  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  CLD work up: HBsAG neg, HbcIgM neg.    Rec:  get INR  Trend daily liver enzymes and INR  get MRI w/wo con and MRCP to evaluate for liver and biliary tract  Get HCV RNA PCR quantitative as HCV Ab+ may be previous exposure or false positive.  Please perform a CLD work up which includes Anti HEV, Serum Ferritin, Transferrin Saturation, Ceruloplasmin level, RUPESH, SMA, immunoglobulins panel, AMA, Anti LK microsomal Ab, anti-soluble liver Ag.  get lipid panel, HGA1c to assess for metabolic syndrome  get HIV screening  get CA 19-9, AFP  will follow up.

## 2021-04-07 NOTE — PROGRESS NOTE ADULT - PROBLEM SELECTOR PLAN 4
will trend lfts, hep panel pending, US showed cirrhotic liver,   Appreciate hepatology consult, wkup per hepatology including mrcp

## 2021-04-07 NOTE — PROGRESS NOTE ADULT - PROBLEM SELECTOR PLAN 1
Leukocytosis and tachypnea. Prior sputum cx's grew HIB and Pseudomonas. Alternates between inhaled tobra and azithromycin but pt having fevers, worsening dyspnea and cough w/ increased sputum production concerning for Pseudomonas pneumonia.  - Lactate initially 1.1,  Received Zosyn x2 in the ED.   - c/w Zosyn 3.375g q8h and  inhaled tobra D#6  - BCx x2 NGTD.    - c/w nebulized budesonide, PRN albuterol nebs, and inhaled hypertonic saline.   - c/w Chest PT, sputum with rare pseudomonas, cont zosyn  - f/u pulm recs

## 2021-04-07 NOTE — DIETITIAN INITIAL EVALUATION ADULT. - PROBLEM SELECTOR PLAN 1
Leukocytosis and tachypnea. Prior sputum cx's grew HIB and Pseudomonas. Alternates between inhaled tobra and azithromycin but pt having fevers, worsening dyspnea and cough w/ increased sputum production concerning for Pseudomonas pneumonia.  - Lactate initially 1.1  - Received Zosyn x2 in the ED. c/w Zosyn 3.375g q8h  - BCx x2 sent, f/u results. Will send sputum cx, and UA/UCx (given complaints of dysuria, family reports patient with history of UTIs in past)  - c/w nebulized budesonide, PRN albuterol nebs, and inhaled hypertonic saline.   - Chest PT

## 2021-04-07 NOTE — CHART NOTE - NSCHARTNOTEFT_GEN_A_CORE
Notified by RN that patient was hypoglycemic to 62 , repeat was 70. RN gave juice and repeat fingersticks are 163 and 175. Monitor fingersticks throughout day. Lantus was increased yesterday for hyperglycemia.

## 2021-04-08 DIAGNOSIS — R13.12 DYSPHAGIA, OROPHARYNGEAL PHASE: ICD-10-CM

## 2021-04-08 LAB
-  AMIKACIN: SIGNIFICANT CHANGE UP
-  AZTREONAM: SIGNIFICANT CHANGE UP
-  CEFEPIME: SIGNIFICANT CHANGE UP
-  CEFTAZIDIME/AVIBACTAM: SIGNIFICANT CHANGE UP
-  CEFTAZIDIME: SIGNIFICANT CHANGE UP
-  CEFTOLOZANE/TAZOBACTAM: SIGNIFICANT CHANGE UP
-  CIPROFLOXACIN: SIGNIFICANT CHANGE UP
-  GENTAMICIN: SIGNIFICANT CHANGE UP
-  IMIPENEM: SIGNIFICANT CHANGE UP
-  LEVOFLOXACIN: SIGNIFICANT CHANGE UP
-  MEROPENEM: SIGNIFICANT CHANGE UP
-  PIPERACILLIN/TAZOBACTAM: SIGNIFICANT CHANGE UP
-  TOBRAMYCIN: SIGNIFICANT CHANGE UP
A1C WITH ESTIMATED AVERAGE GLUCOSE RESULT: 8.5 % — HIGH (ref 4–5.6)
AFP-TM SERPL-MCNC: 2.2 NG/ML — SIGNIFICANT CHANGE UP
ALBUMIN SERPL ELPH-MCNC: 3.2 G/DL — LOW (ref 3.3–5)
ALP SERPL-CCNC: 193 U/L — HIGH (ref 40–120)
ALT FLD-CCNC: 126 U/L — HIGH (ref 4–33)
ANION GAP SERPL CALC-SCNC: 7 MMOL/L — SIGNIFICANT CHANGE UP (ref 7–14)
APTT BLD: 22.6 SEC — LOW (ref 27–36.3)
AST SERPL-CCNC: 88 U/L — HIGH (ref 4–32)
BILIRUB SERPL-MCNC: 0.2 MG/DL — SIGNIFICANT CHANGE UP (ref 0.2–1.2)
BUN SERPL-MCNC: 14 MG/DL — SIGNIFICANT CHANGE UP (ref 7–23)
CALCIUM SERPL-MCNC: 8.6 MG/DL — SIGNIFICANT CHANGE UP (ref 8.4–10.5)
CANCER AG19-9 SERPL-ACNC: 42 U/ML — HIGH
CERULOPLASMIN SERPL-MCNC: 27 MG/DL — SIGNIFICANT CHANGE UP (ref 16–45)
CHLORIDE SERPL-SCNC: 97 MMOL/L — LOW (ref 98–107)
CHOLEST SERPL-MCNC: 192 MG/DL — SIGNIFICANT CHANGE UP
CO2 SERPL-SCNC: 31 MMOL/L — SIGNIFICANT CHANGE UP (ref 22–31)
CREAT SERPL-MCNC: 0.5 MG/DL — SIGNIFICANT CHANGE UP (ref 0.5–1.3)
CULTURE RESULTS: SIGNIFICANT CHANGE UP
ESTIMATED AVERAGE GLUCOSE: 197 MG/DL — HIGH (ref 68–114)
FERRITIN SERPL-MCNC: 169 NG/ML — HIGH (ref 15–150)
GLUCOSE BLDC GLUCOMTR-MCNC: 123 MG/DL — HIGH (ref 70–99)
GLUCOSE BLDC GLUCOMTR-MCNC: 173 MG/DL — HIGH (ref 70–99)
GLUCOSE BLDC GLUCOMTR-MCNC: 189 MG/DL — HIGH (ref 70–99)
GLUCOSE BLDC GLUCOMTR-MCNC: 210 MG/DL — HIGH (ref 70–99)
GLUCOSE BLDC GLUCOMTR-MCNC: 245 MG/DL — HIGH (ref 70–99)
GLUCOSE BLDC GLUCOMTR-MCNC: 86 MG/DL — SIGNIFICANT CHANGE UP (ref 70–99)
GLUCOSE SERPL-MCNC: 154 MG/DL — HIGH (ref 70–99)
HCT VFR BLD CALC: 37.9 % — SIGNIFICANT CHANGE UP (ref 34.5–45)
HDLC SERPL-MCNC: 84 MG/DL — SIGNIFICANT CHANGE UP
HGB BLD-MCNC: 11.9 G/DL — SIGNIFICANT CHANGE UP (ref 11.5–15.5)
HIV 1+2 AB+HIV1 P24 AG SERPL QL IA: SIGNIFICANT CHANGE UP
INR BLD: 0.98 RATIO — SIGNIFICANT CHANGE UP (ref 0.88–1.16)
LIPID PNL WITH DIRECT LDL SERPL: 94 MG/DL — SIGNIFICANT CHANGE UP
MAGNESIUM SERPL-MCNC: 2.1 MG/DL — SIGNIFICANT CHANGE UP (ref 1.6–2.6)
MCHC RBC-ENTMCNC: 27.3 PG — SIGNIFICANT CHANGE UP (ref 27–34)
MCHC RBC-ENTMCNC: 31.4 GM/DL — LOW (ref 32–36)
MCV RBC AUTO: 86.9 FL — SIGNIFICANT CHANGE UP (ref 80–100)
METHOD TYPE: SIGNIFICANT CHANGE UP
NON HDL CHOLESTEROL: 108 MG/DL — SIGNIFICANT CHANGE UP
NRBC # BLD: 0 /100 WBCS — SIGNIFICANT CHANGE UP
NRBC # FLD: 0 K/UL — SIGNIFICANT CHANGE UP
ORGANISM # SPEC MICROSCOPIC CNT: SIGNIFICANT CHANGE UP
ORGANISM # SPEC MICROSCOPIC CNT: SIGNIFICANT CHANGE UP
PHOSPHATE SERPL-MCNC: 3.1 MG/DL — SIGNIFICANT CHANGE UP (ref 2.5–4.5)
PLATELET # BLD AUTO: 194 K/UL — SIGNIFICANT CHANGE UP (ref 150–400)
POTASSIUM SERPL-MCNC: 3.7 MMOL/L — SIGNIFICANT CHANGE UP (ref 3.5–5.3)
POTASSIUM SERPL-SCNC: 3.7 MMOL/L — SIGNIFICANT CHANGE UP (ref 3.5–5.3)
PROT SERPL-MCNC: 6.2 G/DL — SIGNIFICANT CHANGE UP (ref 6–8.3)
PROTHROM AB SERPL-ACNC: 11.3 SEC — SIGNIFICANT CHANGE UP (ref 10.6–13.6)
RBC # BLD: 4.36 M/UL — SIGNIFICANT CHANGE UP (ref 3.8–5.2)
RBC # FLD: 13.2 % — SIGNIFICANT CHANGE UP (ref 10.3–14.5)
SODIUM SERPL-SCNC: 135 MMOL/L — SIGNIFICANT CHANGE UP (ref 135–145)
SPECIMEN SOURCE: SIGNIFICANT CHANGE UP
TRANSFERRIN SERPL-MCNC: 174 MG/DL — LOW (ref 200–360)
TRIGL SERPL-MCNC: 72 MG/DL — SIGNIFICANT CHANGE UP
WBC # BLD: 9.36 K/UL — SIGNIFICANT CHANGE UP (ref 3.8–10.5)
WBC # FLD AUTO: 9.36 K/UL — SIGNIFICANT CHANGE UP (ref 3.8–10.5)

## 2021-04-08 PROCEDURE — 99255 IP/OBS CONSLTJ NEW/EST HI 80: CPT

## 2021-04-08 PROCEDURE — 99233 SBSQ HOSP IP/OBS HIGH 50: CPT | Mod: GC

## 2021-04-08 PROCEDURE — 99232 SBSQ HOSP IP/OBS MODERATE 35: CPT

## 2021-04-08 RX ORDER — PIPERACILLIN AND TAZOBACTAM 4; .5 G/20ML; G/20ML
4.5 INJECTION, POWDER, LYOPHILIZED, FOR SOLUTION INTRAVENOUS EVERY 8 HOURS
Refills: 0 | Status: DISCONTINUED | OUTPATIENT
Start: 2021-04-08 | End: 2021-04-08

## 2021-04-08 RX ORDER — IPRATROPIUM/ALBUTEROL SULFATE 18-103MCG
3 AEROSOL WITH ADAPTER (GRAM) INHALATION EVERY 8 HOURS
Refills: 0 | Status: DISCONTINUED | OUTPATIENT
Start: 2021-04-08 | End: 2021-04-18

## 2021-04-08 RX ORDER — PIPERACILLIN AND TAZOBACTAM 4; .5 G/20ML; G/20ML
4.5 INJECTION, POWDER, LYOPHILIZED, FOR SOLUTION INTRAVENOUS ONCE
Refills: 0 | Status: COMPLETED | OUTPATIENT
Start: 2021-04-08 | End: 2021-04-08

## 2021-04-08 RX ADMIN — BENZOCAINE AND MENTHOL 1 LOZENGE: 5; 1 LIQUID ORAL at 05:19

## 2021-04-08 RX ADMIN — POLYETHYLENE GLYCOL 3350 17 GRAM(S): 17 POWDER, FOR SOLUTION ORAL at 13:17

## 2021-04-08 RX ADMIN — INSULIN GLARGINE 22 UNIT(S): 100 INJECTION, SOLUTION SUBCUTANEOUS at 23:22

## 2021-04-08 RX ADMIN — MONTELUKAST 10 MILLIGRAM(S): 4 TABLET, CHEWABLE ORAL at 13:16

## 2021-04-08 RX ADMIN — BENZOCAINE AND MENTHOL 1 LOZENGE: 5; 1 LIQUID ORAL at 18:04

## 2021-04-08 RX ADMIN — Medication 20 MILLIGRAM(S): at 05:19

## 2021-04-08 RX ADMIN — Medication 100 MILLIGRAM(S): at 22:05

## 2021-04-08 RX ADMIN — DIPHENHYDRAMINE HYDROCHLORIDE AND LIDOCAINE HYDROCHLORIDE AND ALUMINUM HYDROXIDE AND MAGNESIUM HYDRO 10 MILLILITER(S): KIT at 22:07

## 2021-04-08 RX ADMIN — SODIUM CHLORIDE 4 MILLILITER(S): 9 INJECTION INTRAMUSCULAR; INTRAVENOUS; SUBCUTANEOUS at 14:55

## 2021-04-08 RX ADMIN — DIPHENHYDRAMINE HYDROCHLORIDE AND LIDOCAINE HYDROCHLORIDE AND ALUMINUM HYDROXIDE AND MAGNESIUM HYDRO 10 MILLILITER(S): KIT at 13:18

## 2021-04-08 RX ADMIN — Medication 500000 UNIT(S): at 22:05

## 2021-04-08 RX ADMIN — Medication 1: at 18:02

## 2021-04-08 RX ADMIN — Medication 300 MILLIGRAM(S): at 23:34

## 2021-04-08 RX ADMIN — Medication 5 UNIT(S): at 08:50

## 2021-04-08 RX ADMIN — DIPHENHYDRAMINE HYDROCHLORIDE AND LIDOCAINE HYDROCHLORIDE AND ALUMINUM HYDROXIDE AND MAGNESIUM HYDRO 10 MILLILITER(S): KIT at 05:19

## 2021-04-08 RX ADMIN — SODIUM CHLORIDE 4 MILLILITER(S): 9 INJECTION INTRAMUSCULAR; INTRAVENOUS; SUBCUTANEOUS at 23:33

## 2021-04-08 RX ADMIN — ENOXAPARIN SODIUM 40 MILLIGRAM(S): 100 INJECTION SUBCUTANEOUS at 13:18

## 2021-04-08 RX ADMIN — Medication 500000 UNIT(S): at 18:02

## 2021-04-08 RX ADMIN — Medication 37.5 MILLIGRAM(S): at 13:17

## 2021-04-08 RX ADMIN — BUDESONIDE AND FORMOTEROL FUMARATE DIHYDRATE 2 PUFF(S): 160; 4.5 AEROSOL RESPIRATORY (INHALATION) at 22:07

## 2021-04-08 RX ADMIN — Medication 3 MILLILITER(S): at 09:40

## 2021-04-08 RX ADMIN — Medication 2: at 13:12

## 2021-04-08 RX ADMIN — Medication 300 MILLIGRAM(S): at 09:41

## 2021-04-08 RX ADMIN — SODIUM CHLORIDE 4 MILLILITER(S): 9 INJECTION INTRAMUSCULAR; INTRAVENOUS; SUBCUTANEOUS at 09:40

## 2021-04-08 RX ADMIN — BENZOCAINE AND MENTHOL 1 LOZENGE: 5; 1 LIQUID ORAL at 23:23

## 2021-04-08 RX ADMIN — PANTOPRAZOLE SODIUM 40 MILLIGRAM(S): 20 TABLET, DELAYED RELEASE ORAL at 05:20

## 2021-04-08 RX ADMIN — BENZOCAINE AND MENTHOL 1 LOZENGE: 5; 1 LIQUID ORAL at 14:33

## 2021-04-08 RX ADMIN — Medication 3 MILLILITER(S): at 23:33

## 2021-04-08 RX ADMIN — Medication 100 MILLIGRAM(S): at 05:19

## 2021-04-08 RX ADMIN — Medication 500000 UNIT(S): at 10:49

## 2021-04-08 RX ADMIN — Medication 325 MILLIGRAM(S): at 13:17

## 2021-04-08 RX ADMIN — Medication 500000 UNIT(S): at 05:19

## 2021-04-08 RX ADMIN — Medication 1 TABLET(S): at 13:21

## 2021-04-08 RX ADMIN — Medication 5 UNIT(S): at 13:11

## 2021-04-08 RX ADMIN — Medication 100 MILLIGRAM(S): at 13:22

## 2021-04-08 RX ADMIN — PIPERACILLIN AND TAZOBACTAM 200 GRAM(S): 4; .5 INJECTION, POWDER, LYOPHILIZED, FOR SOLUTION INTRAVENOUS at 13:19

## 2021-04-08 RX ADMIN — SENNA PLUS 2 TABLET(S): 8.6 TABLET ORAL at 22:05

## 2021-04-08 RX ADMIN — PIPERACILLIN AND TAZOBACTAM 25 GRAM(S): 4; .5 INJECTION, POWDER, LYOPHILIZED, FOR SOLUTION INTRAVENOUS at 01:56

## 2021-04-08 RX ADMIN — Medication 3 MILLILITER(S): at 03:59

## 2021-04-08 RX ADMIN — Medication 3 MILLILITER(S): at 14:51

## 2021-04-08 RX ADMIN — Medication 1 SPRAY(S): at 13:16

## 2021-04-08 RX ADMIN — BUDESONIDE AND FORMOTEROL FUMARATE DIHYDRATE 2 PUFF(S): 160; 4.5 AEROSOL RESPIRATORY (INHALATION) at 12:28

## 2021-04-08 RX ADMIN — Medication 5 UNIT(S): at 18:01

## 2021-04-08 NOTE — PROGRESS NOTE ADULT - PROBLEM SELECTOR PLAN 3
Per pt, on Basaglar 10u qhs and Admelog 2u qAC at home -->   - Cont lantus 22 units, and premeal 5 units tid  - cont to monitor and adjust insulin prn

## 2021-04-08 NOTE — PROGRESS NOTE ADULT - SUBJECTIVE AND OBJECTIVE BOX
Patient is a 66y old  Female who presents with a chief complaint of Pneumonia (08 Apr 2021 07:32)      SUBJECTIVE / OVERNIGHT EVENTS: Pt seen and examined at 12N, no overnight events, spoke through pt's daughter Brian as per pt's request, still with productive cough expectorating a lot of sputum, explained that she is waiting for an MRI, daughter asking if she could some med for her anxiety prior to the MRI, no other new issues reported.        MEDICATIONS  (STANDING):  ALBUTerol    90 MICROgram(s) HFA Inhaler 1 Puff(s) Inhalation every 6 hours  albuterol/ipratropium for Nebulization 3 milliLiter(s) Nebulizer every 6 hours  benzocaine 15 mG/menthol 3.6 mG (Sugar-Free) Lozenge 1 Lozenge Oral every 6 hours  benzonatate 100 milliGRAM(s) Oral three times a day  budesonide 160 MICROgram(s)/formoterol 4.5 MICROgram(s) Inhaler 2 Puff(s) Inhalation two times a day  calcium carbonate 1250 mG  + Vitamin D (OsCal 500 + D) 1 Tablet(s) Oral daily  cyanocobalamin Injectable 1000 MICROGram(s) IntraMuscular <User Schedule>  dextrose 40% Gel 15 Gram(s) Oral once  dextrose 5%. 1000 milliLiter(s) (50 mL/Hr) IV Continuous <Continuous>  dextrose 5%. 1000 milliLiter(s) (100 mL/Hr) IV Continuous <Continuous>  dextrose 50% Injectable 25 Gram(s) IV Push once  dextrose 50% Injectable 12.5 Gram(s) IV Push once  dextrose 50% Injectable 25 Gram(s) IV Push once  dextrose 50% Injectable 25 Gram(s) IV Push once  enoxaparin Injectable 40 milliGRAM(s) SubCutaneous daily  ferrous    sulfate 325 milliGRAM(s) Oral daily  FIRST- Mouthwash  BLM 10 milliLiter(s) Swish and Spit every 8 hours  fluticasone propionate 50 MICROgram(s)/spray Nasal Spray 1 Spray(s) Both Nostrils <User Schedule>  glucagon  Injectable 1 milliGRAM(s) IntraMuscular once  influenza   Vaccine 0.5 milliLiter(s) IntraMuscular once  insulin glargine Injectable (LANTUS) 22 Unit(s) SubCutaneous at bedtime  insulin lispro (ADMELOG) corrective regimen sliding scale   SubCutaneous three times a day before meals  insulin lispro (ADMELOG) corrective regimen sliding scale   SubCutaneous at bedtime  insulin lispro Injectable (ADMELOG) 5 Unit(s) SubCutaneous three times a day before meals  LORazepam     Tablet 1 milliGRAM(s) Oral once  montelukast 10 milliGRAM(s) Oral daily  nystatin    Suspension [id number] Unit(s) Swish and Swallow four times a day  pantoprazole    Tablet 40 milliGRAM(s) Oral before breakfast  piperacillin/tazobactam IVPB.. 4.5 Gram(s) IV Intermittent every 8 hours  polyethylene glycol 3350 17 Gram(s) Oral daily  predniSONE   Tablet 20 milliGRAM(s) Oral daily  senna 2 Tablet(s) Oral at bedtime  sodium chloride 7% Inhalation 4 milliLiter(s) Inhalation every 8 hours  tiotropium 18 MICROgram(s) Capsule 1 Capsule(s) Inhalation daily  tobramycin for Nebulization 300 milliGRAM(s) Inhalation every 12 hours  venlafaxine XR. 37.5 milliGRAM(s) Oral daily    MEDICATIONS  (PRN):  acetaminophen   Tablet .. 650 milliGRAM(s) Oral every 6 hours PRN Mild Pain (1 - 3)      Vital Signs Last 24 Hrs  T(C): 36.7 (08 Apr 2021 09:15), Max: 36.8 (07 Apr 2021 18:38)  T(F): 98 (08 Apr 2021 09:15), Max: 98.2 (07 Apr 2021 18:38)  HR: 97 (08 Apr 2021 09:44) (73 - 98)  BP: 144/66 (08 Apr 2021 09:15) (118/54 - 152/65)  BP(mean): --  RR: 18 (08 Apr 2021 09:15) (16 - 18)  SpO2: 99% (08 Apr 2021 09:44) (98% - 100%)  CAPILLARY BLOOD GLUCOSE      POCT Blood Glucose.: 245 mg/dL (08 Apr 2021 13:10)  POCT Blood Glucose.: 210 mg/dL (08 Apr 2021 10:44)  POCT Blood Glucose.: 123 mg/dL (08 Apr 2021 08:48)  POCT Blood Glucose.: 250 mg/dL (07 Apr 2021 22:28)  POCT Blood Glucose.: 98 mg/dL (07 Apr 2021 17:42)    I&O's Summary    07 Apr 2021 07:01  -  08 Apr 2021 07:00  --------------------------------------------------------  IN: 650 mL / OUT: 0 mL / NET: 650 mL        PHYSICAL EXAM:  GENERAL: NAD, thinly built female  HEENT: no thrush  CHEST/LUNG: +rhonchi b/l, coughs upon deep breath  HEART: Regular rate and rhythm; No murmurs  ABDOMEN: Soft, Nontender, Nondistended  EXTREMITIES: no LE edema  PSYCH: Calm  NEUROLOGY: AAOx3  SKIN: No rashes or lesions    LABS:                        11.9   9.36  )-----------( 194      ( 08 Apr 2021 07:36 )             37.9     04-08    135  |  97<L>  |  14  ----------------------------<  154<H>  3.7   |  31  |  0.50    Ca    8.6      08 Apr 2021 07:36  Phos  3.1     04-08  Mg     2.1     04-08    TPro  x   /  Alb  x   /  TBili  0.2  /  DBili  x   /  AST  x   /  ALT  x   /  AlkPhos  x   04-08    PT/INR - ( 08 Apr 2021 07:36 )   PT: 11.3 sec;   INR: 0.98 ratio         PTT - ( 08 Apr 2021 07:36 )  PTT:22.6 sec          RADIOLOGY & ADDITIONAL TESTS:    Imaging Personally Reviewed:    Consultant(s) Notes Reviewed:      Care Discussed with Consultants/Other Providers:

## 2021-04-08 NOTE — PROGRESS NOTE ADULT - PROBLEM SELECTOR PLAN 4
will trend lfts, hep panel with Hep C reactive, hiv neg, waiting for further pending labs, US showed cirrhotic liver,   Appreciate hepatology consult, wkup per hepatology  - mrcp pending  - daughter Brian updated on 4/8

## 2021-04-08 NOTE — CONSULT NOTE ADULT - ASSESSMENT
66F with primary ciliary dyskinesia, cystitic bronchiectasis, ABPA, DM admitted 3/31/21 with acute respiratory exacerbation.   CR Pseudomonas from sputum with new KELLY opacity.     Sabino Nielsen MD   Infectious Disease   Pager [phone]   After 5PM and on weekends please page fellow on call or call [phone] 66F with primary ciliary dyskinesia, cystitic bronchiectasis, ABPA, DM admitted 3/31/21 with acute respiratory exacerbation.   CR Pseudomonas from sputum with new KELLY opacity.   She's grown Pseudomonas over the years, chronic infection. Eradication will be unlikely.   Afebrile, looks well and feels better since admission, maybe some effect from Zosyn which is intermediate.     Suggest  -change Zosyn to Avycaz 2.5GM IV q8h  -continued inhaled Tobramycin  -optimal duration of therapy unclear, monitor for further improvement, will reassess next week     I will be away tomorrow, returning Mon. Please page ID fellow on call if follow up is needed.   Spoke with medicine    Sabino Nielsen MD   Infectious Disease   Pager [phone]   After 5PM and on weekends please page fellow on call or call [phone]

## 2021-04-08 NOTE — PROGRESS NOTE ADULT - SUBJECTIVE AND OBJECTIVE BOX
Interval Events: Patient was seen and examined at bedside. No overnight events.    REVIEW OF SYSTEMS:  Constitutional: [ ] fevers [ ] chills [ ] weight loss [ ] weight gain  CV: [ ] chest pain [ ] orthopnea [ ] palpitations [ ] murmur  Resp: [ ] cough [ ] shortness of breath [ ] dyspnea [ ] wheezing [ ] sputum [ ] hemoptysis  [ ] All other systems negative  [ ] Unable to assess ROS because ________    OBJECTIVE:  ICU Vital Signs Last 24 Hrs  T(C): 36.7 (08 Apr 2021 05:15), Max: 37.2 (07 Apr 2021 09:27)  T(F): 98 (08 Apr 2021 05:15), Max: 99 (07 Apr 2021 09:27)  HR: 78 (08 Apr 2021 05:15) (73 - 86)  BP: 140/74 (08 Apr 2021 05:15) (118/54 - 152/65)  BP(mean): --  ABP: --  ABP(mean): --  RR: 16 (08 Apr 2021 05:15) (16 - 18)  SpO2: 98% (08 Apr 2021 05:15) (98% - 100%)        04-07 @ 07:01  -  04-08 @ 07:00  --------------------------------------------------------  IN: 650 mL / OUT: 0 mL / NET: 650 mL      CAPILLARY BLOOD GLUCOSE      POCT Blood Glucose.: 250 mg/dL (07 Apr 2021 22:28)      PHYSICAL EXAM:        HOSPITAL MEDICATIONS:  MEDICATIONS  (STANDING):  ALBUTerol    90 MICROgram(s) HFA Inhaler 1 Puff(s) Inhalation every 6 hours  albuterol/ipratropium for Nebulization 3 milliLiter(s) Nebulizer every 6 hours  benzocaine 15 mG/menthol 3.6 mG (Sugar-Free) Lozenge 1 Lozenge Oral every 6 hours  benzonatate 100 milliGRAM(s) Oral three times a day  budesonide 160 MICROgram(s)/formoterol 4.5 MICROgram(s) Inhaler 2 Puff(s) Inhalation two times a day  calcium carbonate 1250 mG  + Vitamin D (OsCal 500 + D) 1 Tablet(s) Oral daily  cyanocobalamin Injectable 1000 MICROGram(s) IntraMuscular <User Schedule>  dextrose 40% Gel 15 Gram(s) Oral once  dextrose 5%. 1000 milliLiter(s) (50 mL/Hr) IV Continuous <Continuous>  dextrose 5%. 1000 milliLiter(s) (100 mL/Hr) IV Continuous <Continuous>  dextrose 50% Injectable 25 Gram(s) IV Push once  dextrose 50% Injectable 12.5 Gram(s) IV Push once  dextrose 50% Injectable 25 Gram(s) IV Push once  dextrose 50% Injectable 25 Gram(s) IV Push once  enoxaparin Injectable 40 milliGRAM(s) SubCutaneous daily  ferrous    sulfate 325 milliGRAM(s) Oral daily  FIRST- Mouthwash  BLM 10 milliLiter(s) Swish and Spit every 8 hours  fluticasone propionate 50 MICROgram(s)/spray Nasal Spray 1 Spray(s) Both Nostrils <User Schedule>  glucagon  Injectable 1 milliGRAM(s) IntraMuscular once  influenza   Vaccine 0.5 milliLiter(s) IntraMuscular once  insulin glargine Injectable (LANTUS) 22 Unit(s) SubCutaneous at bedtime  insulin lispro (ADMELOG) corrective regimen sliding scale   SubCutaneous three times a day before meals  insulin lispro (ADMELOG) corrective regimen sliding scale   SubCutaneous at bedtime  insulin lispro Injectable (ADMELOG) 5 Unit(s) SubCutaneous three times a day before meals  montelukast 10 milliGRAM(s) Oral daily  nystatin    Suspension [id number] Unit(s) Swish and Swallow four times a day  pantoprazole    Tablet 40 milliGRAM(s) Oral before breakfast  polyethylene glycol 3350 17 Gram(s) Oral daily  predniSONE   Tablet 20 milliGRAM(s) Oral daily  senna 2 Tablet(s) Oral at bedtime  sodium chloride 7% Inhalation 4 milliLiter(s) Inhalation every 8 hours  tiotropium 18 MICROgram(s) Capsule 1 Capsule(s) Inhalation daily  tobramycin for Nebulization 300 milliGRAM(s) Inhalation every 12 hours  venlafaxine XR. 37.5 milliGRAM(s) Oral daily    MEDICATIONS  (PRN):  acetaminophen   Tablet .. 650 milliGRAM(s) Oral every 6 hours PRN Mild Pain (1 - 3)      LABS:                        12.1   9.32  )-----------( 201      ( 07 Apr 2021 06:58 )             38.8     Hgb Trend: 12.1<--, 11.6<--, 11.4<--, 11.1<--, 10.8<--  04-07    134<L>  |  96<L>  |  14  ----------------------------<  239<H>  3.8   |  33<H>  |  0.53    Ca    8.9      07 Apr 2021 06:58  Phos  2.9     04-07  Mg     2.2     04-07    TPro  6.3  /  Alb  3.0<L>  /  TBili  0.2  /  DBili  x   /  AST  65<H>  /  ALT  95<H>  /  AlkPhos  174<H>  04-07    Creatinine Trend: 0.53<--, 0.42<--, 0.49<--, 0.42<--, 0.45<--, 0.38<--          MICROBIOLOGY:     RADIOLOGY:  [ ] Reviewed and interpreted by me   Interval Events: Patient was seen and examined at bedside. No overnight events.  Patient overall appears to be better. Has chronic cough and sputum. But also still wheezing on exam.     REVIEW OF SYSTEMS:  Constitutional: [ ] fevers [ ] chills [ ] weight loss [ ] weight gain  CV: [ ] chest pain [ ] orthopnea [ ] palpitations [ ] murmur  Resp: [x ] cough [ ] shortness of breath [ ] dyspnea [ x] wheezing [x] sputum [ ] hemoptysis  [ x] All other systems negative  [ ] Unable to assess ROS because ________    OBJECTIVE:  ICU Vital Signs Last 24 Hrs  T(C): 36.7 (08 Apr 2021 05:15), Max: 37.2 (07 Apr 2021 09:27)  T(F): 98 (08 Apr 2021 05:15), Max: 99 (07 Apr 2021 09:27)  HR: 78 (08 Apr 2021 05:15) (73 - 86)  BP: 140/74 (08 Apr 2021 05:15) (118/54 - 152/65)  BP(mean): --  ABP: --  ABP(mean): --  RR: 16 (08 Apr 2021 05:15) (16 - 18)  SpO2: 98% (08 Apr 2021 05:15) (98% - 100%)        04-07 @ 07:01  -  04-08 @ 07:00  --------------------------------------------------------  IN: 650 mL / OUT: 0 mL / NET: 650 mL      CAPILLARY BLOOD GLUCOSE      POCT Blood Glucose.: 250 mg/dL (07 Apr 2021 22:28)    PHYSICAL EXAM:  GENERAL: NAD, thinly built female  CHEST/LUNG: b/l Rhonchi+and exp wheezing.   HEART: Regular rate and rhythm; No murmurs  ABDOMEN: Soft, Nontender, Nondistended  EXTREMITIES: no LE edema  PSYCH: Calm  NEUROLOGY: AAOx3  SKIN: No rashes or lesions      HOSPITAL MEDICATIONS:  MEDICATIONS  (STANDING):  ALBUTerol    90 MICROgram(s) HFA Inhaler 1 Puff(s) Inhalation every 6 hours  albuterol/ipratropium for Nebulization 3 milliLiter(s) Nebulizer every 6 hours  benzocaine 15 mG/menthol 3.6 mG (Sugar-Free) Lozenge 1 Lozenge Oral every 6 hours  benzonatate 100 milliGRAM(s) Oral three times a day  budesonide 160 MICROgram(s)/formoterol 4.5 MICROgram(s) Inhaler 2 Puff(s) Inhalation two times a day  calcium carbonate 1250 mG  + Vitamin D (OsCal 500 + D) 1 Tablet(s) Oral daily  cyanocobalamin Injectable 1000 MICROGram(s) IntraMuscular <User Schedule>  dextrose 40% Gel 15 Gram(s) Oral once  dextrose 5%. 1000 milliLiter(s) (50 mL/Hr) IV Continuous <Continuous>  dextrose 5%. 1000 milliLiter(s) (100 mL/Hr) IV Continuous <Continuous>  dextrose 50% Injectable 25 Gram(s) IV Push once  dextrose 50% Injectable 12.5 Gram(s) IV Push once  dextrose 50% Injectable 25 Gram(s) IV Push once  dextrose 50% Injectable 25 Gram(s) IV Push once  enoxaparin Injectable 40 milliGRAM(s) SubCutaneous daily  ferrous    sulfate 325 milliGRAM(s) Oral daily  FIRST- Mouthwash  BLM 10 milliLiter(s) Swish and Spit every 8 hours  fluticasone propionate 50 MICROgram(s)/spray Nasal Spray 1 Spray(s) Both Nostrils <User Schedule>  glucagon  Injectable 1 milliGRAM(s) IntraMuscular once  influenza   Vaccine 0.5 milliLiter(s) IntraMuscular once  insulin glargine Injectable (LANTUS) 22 Unit(s) SubCutaneous at bedtime  insulin lispro (ADMELOG) corrective regimen sliding scale   SubCutaneous three times a day before meals  insulin lispro (ADMELOG) corrective regimen sliding scale   SubCutaneous at bedtime  insulin lispro Injectable (ADMELOG) 5 Unit(s) SubCutaneous three times a day before meals  montelukast 10 milliGRAM(s) Oral daily  nystatin    Suspension [id number] Unit(s) Swish and Swallow four times a day  pantoprazole    Tablet 40 milliGRAM(s) Oral before breakfast  polyethylene glycol 3350 17 Gram(s) Oral daily  predniSONE   Tablet 20 milliGRAM(s) Oral daily  senna 2 Tablet(s) Oral at bedtime  sodium chloride 7% Inhalation 4 milliLiter(s) Inhalation every 8 hours  tiotropium 18 MICROgram(s) Capsule 1 Capsule(s) Inhalation daily  tobramycin for Nebulization 300 milliGRAM(s) Inhalation every 12 hours  venlafaxine XR. 37.5 milliGRAM(s) Oral daily    MEDICATIONS  (PRN):  acetaminophen   Tablet .. 650 milliGRAM(s) Oral every 6 hours PRN Mild Pain (1 - 3)      LABS:                        12.1   9.32  )-----------( 201      ( 07 Apr 2021 06:58 )             38.8     Hgb Trend: 12.1<--, 11.6<--, 11.4<--, 11.1<--, 10.8<--  04-07    134<L>  |  96<L>  |  14  ----------------------------<  239<H>  3.8   |  33<H>  |  0.53    Ca    8.9      07 Apr 2021 06:58  Phos  2.9     04-07  Mg     2.2     04-07    TPro  6.3  /  Alb  3.0<L>  /  TBili  0.2  /  DBili  x   /  AST  65<H>  /  ALT  95<H>  /  AlkPhos  174<H>  04-07    Creatinine Trend: 0.53<--, 0.42<--, 0.49<--, 0.42<--, 0.45<--, 0.38<--          MICROBIOLOGY:     RADIOLOGY:  [ ] Reviewed and interpreted by me

## 2021-04-08 NOTE — CONSULT NOTE ADULT - SUBJECTIVE AND OBJECTIVE BOX
HPI:  66F with primary ciliary dyskinesia, cystitic bronchiectasis, ABPA, DM admitted 3/31/21 with two weeks of worsening dyspnea and two days of fever.       PAST MEDICAL & SURGICAL HISTORY:  ABPA (allergic bronchopulmonary aspergillosis)  Bronchiectasis  Hypertension  Vitamin D deficiency  Microcytic anemia  GERD (gastroesophageal reflux disease)  Postnasal drip  Pseudomonas aeruginosa colonization  Allergic rhinitis  Recurrent pneumonia  Type 2 diabetes mellitus, with long-term current use of insulin  History of cholecystectomy        Allergies    No Known Allergies    Intolerances        ANTIMICROBIALS:  nystatin    Suspension 435602 four times a day  piperacillin/tazobactam IVPB.. 4.5 every 8 hours  tobramycin for Nebulization 300 every 12 hours      OTHER MEDS:  acetaminophen   Tablet .. 650 milliGRAM(s) Oral every 6 hours PRN  ALBUTerol    90 MICROgram(s) HFA Inhaler 1 Puff(s) Inhalation every 6 hours  albuterol/ipratropium for Nebulization 3 milliLiter(s) Nebulizer every 8 hours  benzocaine 15 mG/menthol 3.6 mG (Sugar-Free) Lozenge 1 Lozenge Oral every 6 hours  benzonatate 100 milliGRAM(s) Oral three times a day  budesonide 160 MICROgram(s)/formoterol 4.5 MICROgram(s) Inhaler 2 Puff(s) Inhalation two times a day  calcium carbonate 1250 mG  + Vitamin D (OsCal 500 + D) 1 Tablet(s) Oral daily  cyanocobalamin Injectable 1000 MICROGram(s) IntraMuscular <User Schedule>  dextrose 40% Gel 15 Gram(s) Oral once  dextrose 5%. 1000 milliLiter(s) IV Continuous <Continuous>  dextrose 5%. 1000 milliLiter(s) IV Continuous <Continuous>  dextrose 50% Injectable 25 Gram(s) IV Push once  dextrose 50% Injectable 12.5 Gram(s) IV Push once  dextrose 50% Injectable 25 Gram(s) IV Push once  dextrose 50% Injectable 25 Gram(s) IV Push once  enoxaparin Injectable 40 milliGRAM(s) SubCutaneous daily  ferrous    sulfate 325 milliGRAM(s) Oral daily  FIRST- Mouthwash  BLM 10 milliLiter(s) Swish and Spit every 8 hours  fluticasone propionate 50 MICROgram(s)/spray Nasal Spray 1 Spray(s) Both Nostrils <User Schedule>  glucagon  Injectable 1 milliGRAM(s) IntraMuscular once  influenza   Vaccine 0.5 milliLiter(s) IntraMuscular once  insulin glargine Injectable (LANTUS) 22 Unit(s) SubCutaneous at bedtime  insulin lispro (ADMELOG) corrective regimen sliding scale   SubCutaneous three times a day before meals  insulin lispro (ADMELOG) corrective regimen sliding scale   SubCutaneous at bedtime  insulin lispro Injectable (ADMELOG) 5 Unit(s) SubCutaneous three times a day before meals  LORazepam     Tablet 1 milliGRAM(s) Oral once  montelukast 10 milliGRAM(s) Oral daily  pantoprazole    Tablet 40 milliGRAM(s) Oral before breakfast  polyethylene glycol 3350 17 Gram(s) Oral daily  predniSONE   Tablet 20 milliGRAM(s) Oral daily  senna 2 Tablet(s) Oral at bedtime  sodium chloride 7% Inhalation 4 milliLiter(s) Inhalation every 8 hours  tiotropium 18 MICROgram(s) Capsule 1 Capsule(s) Inhalation daily  venlafaxine XR. 37.5 milliGRAM(s) Oral daily      SOCIAL HISTORY:    FAMILY HISTORY:  Family history of diabetes mellitus  father    Family history of allergic rhinitis (Child, Child)  son, daughter    Family history of bronchitis  parents        ROS:  Unobtainable because:   All other systems negative   Constitutional: no fever, no chills  Eye: no vision changes  ENT: no sore throat, no rhinorrhea  Cardiovascular: no chest pain, no palpitation  Respiratory: no SOB, no cough  GI:  no abd pain, no vomiting, no diarrhea  urinary: no dysuria, no hematuria, no flank pain  musculoskeletal: no joint pain, no joint swelling  skin: no rash  neurology: no headache, no change in mental status  psych: no anxiety    Physical Exam:  General: awake, alert, non toxic  Head: atraumatic, normocephalic  Eyes: normal sclera and conjunctiva  ENT: no oropharyngeal lesions, no LAD, neck supple  Cardio: regular rate and rhythm   Respiratory: nonlabored on room air, clear bilaterally, no wheezing  abd: soft, bowel sounds present, not tender  : no suprapubic tenderness, no francisco  Musculoskeletal: no joint swelling, no edema  Skin: no rash  vascular: no phlebitis  Neurologic: no focal deficits  psych: normal affect       Drug Dosing Weight  Height (cm): 166.4 (31 Mar 2021 17:42)  Weight (kg): 63.503 (01 Apr 2021 07:17)  BMI (kg/m2): 22.9 (01 Apr 2021 07:17)  BSA (m2): 1.71 (01 Apr 2021 07:17)    Vital Signs Last 24 Hrs  T(F): 98 (04-08-21 @ 09:15), Max: 99 (04-07-21 @ 09:27)    Vital Signs Last 24 Hrs  HR: 94 (04-08-21 @ 14:55) (73 - 98)  BP: 144/66 (04-08-21 @ 09:15) (130/62 - 152/65)  RR: 18 (04-08-21 @ 09:15)  SpO2: 98% (04-08-21 @ 14:55) (98% - 100%)  Wt(kg): --                          11.9   9.36  )-----------( 194      ( 08 Apr 2021 07:36 )             37.9       04-08    135  |  97<L>  |  14  ----------------------------<  154<H>  3.7   |  31  |  0.50    Ca    8.6      08 Apr 2021 07:36  Phos  3.1     04-08  Mg     2.1     04-08    TPro  x   /  Alb  x   /  TBili  0.2  /  DBili  x   /  AST  x   /  ALT  x   /  AlkPhos  x   04-08          MICROBIOLOGY:  Culture - Sputum (collected 04-04-21 @ 14:28)  Source: .Sputum Sputum  Gram Stain (04-04-21 @ 19:59):    Rare Squamous epithelial cells per low power field    Rare polymorphonuclear leukocytes per low power field    Rare Yeast like cells per oil power field    Rare Gram positive cocci in pairs per oil power field    Rare Gram Variable Rods per oil power field  Final Report (04-08-21 @ 12:23):    Rare Pseudomonas aeruginosa (Carbapenem Resistant)    Normal Respiratory Denise present  Organism: Pseudomonas aeruginosa (Carbapenem Resistant) (04-08-21 @ 14:24)      -  Amikacin: S <=16      -  Aztreonam: R >16      -  Cefepime: R >16      -  Ceftazidime: S 4      -  Ceftazidime/Avibactam: S <=4      -  Ceftolozane/tazobactam: S <=2      -  Ciprofloxacin: R >2      -  Gentamicin: S 4      -  Imipenem: R >8      -  Levofloxacin: R >4      -  Meropenem: I 4      -  Piperacillin/Tazobactam: I 32      -  Tobramycin: S <=2      Method Type: ELIEZER  Organism: Pseudomonas aeruginosa (Carbapenem Resistant) (04-08-21 @ 12:23)    Culture - Urine (collected 04-01-21 @ 15:58)  Source: .Urine Clean Catch (Midstream)  Final Report (04-03-21 @ 01:38):    >=3 organisms. Probable collection contamination.    RADIOLOGY:  Images below reviewed personally  CT Chest No Cont (03.31.21 @ 21:18)   When compared to5/14/2019, new groundglass opacities and superimposed areas of consolidation seen in the left apex and anterior portion of the left upper lobe concerning for pneumonia.  Chronic findings of bronchiectasis/bronchial wall thickening and mucoid impacted airways..    US Abdomen Limited (04.06.21 @ 09:09)   Cirrhotic liver.   HPI:  66F with primary ciliary dyskinesia, cystitic bronchiectasis, ABPA, DM admitted 3/31/21 with two weeks of worsening dyspnea and two days of fever.   ID consulted for CR Pseudomonas on sputum.   Family over the phone provided interpretation.   She feels better since admission. Hasn't been able to ambulate much. Still bringing up phlegm but cough is improved.   No sore throat, no chest pain. No fevers or chills.     PAST MEDICAL & SURGICAL HISTORY:  ABPA (allergic bronchopulmonary aspergillosis)  Bronchiectasis  Hypertension  Vitamin D deficiency  Microcytic anemia  GERD (gastroesophageal reflux disease)  Postnasal drip  Pseudomonas aeruginosa colonization  Allergic rhinitis  Recurrent pneumonia  Type 2 diabetes mellitus, with long-term current use of insulin  History of cholecystectomy        Allergies    No Known Allergies    Intolerances        ANTIMICROBIALS:  nystatin    Suspension [id number] four times a day  piperacillin/tazobactam IVPB.. 4.5 every 8 hours  tobramycin for Nebulization 300 every 12 hours      OTHER MEDS:  acetaminophen   Tablet .. 650 milliGRAM(s) Oral every 6 hours PRN  ALBUTerol    90 MICROgram(s) HFA Inhaler 1 Puff(s) Inhalation every 6 hours  albuterol/ipratropium for Nebulization 3 milliLiter(s) Nebulizer every 8 hours  benzocaine 15 mG/menthol 3.6 mG (Sugar-Free) Lozenge 1 Lozenge Oral every 6 hours  benzonatate 100 milliGRAM(s) Oral three times a day  budesonide 160 MICROgram(s)/formoterol 4.5 MICROgram(s) Inhaler 2 Puff(s) Inhalation two times a day  calcium carbonate 1250 mG  + Vitamin D (OsCal 500 + D) 1 Tablet(s) Oral daily  cyanocobalamin Injectable 1000 MICROGram(s) IntraMuscular <User Schedule>  dextrose 40% Gel 15 Gram(s) Oral once  dextrose 5%. 1000 milliLiter(s) IV Continuous <Continuous>  dextrose 5%. 1000 milliLiter(s) IV Continuous <Continuous>  dextrose 50% Injectable 25 Gram(s) IV Push once  dextrose 50% Injectable 12.5 Gram(s) IV Push once  dextrose 50% Injectable 25 Gram(s) IV Push once  dextrose 50% Injectable 25 Gram(s) IV Push once  enoxaparin Injectable 40 milliGRAM(s) SubCutaneous daily  ferrous    sulfate 325 milliGRAM(s) Oral daily  FIRST- Mouthwash  BLM 10 milliLiter(s) Swish and Spit every 8 hours  fluticasone propionate 50 MICROgram(s)/spray Nasal Spray 1 Spray(s) Both Nostrils <User Schedule>  glucagon  Injectable 1 milliGRAM(s) IntraMuscular once  influenza   Vaccine 0.5 milliLiter(s) IntraMuscular once  insulin glargine Injectable (LANTUS) 22 Unit(s) SubCutaneous at bedtime  insulin lispro (ADMELOG) corrective regimen sliding scale   SubCutaneous three times a day before meals  insulin lispro (ADMELOG) corrective regimen sliding scale   SubCutaneous at bedtime  insulin lispro Injectable (ADMELOG) 5 Unit(s) SubCutaneous three times a day before meals  LORazepam     Tablet 1 milliGRAM(s) Oral once  montelukast 10 milliGRAM(s) Oral daily  pantoprazole    Tablet 40 milliGRAM(s) Oral before breakfast  polyethylene glycol 3350 17 Gram(s) Oral daily  predniSONE   Tablet 20 milliGRAM(s) Oral daily  senna 2 Tablet(s) Oral at bedtime  sodium chloride 7% Inhalation 4 milliLiter(s) Inhalation every 8 hours  tiotropium 18 MICROgram(s) Capsule 1 Capsule(s) Inhalation daily  venlafaxine XR. 37.5 milliGRAM(s) Oral daily      SOCIAL HISTORY: Nonsmoker     FAMILY HISTORY:  Family history of diabetes mellitus  Family history of allergic rhinitis (Child, Child) son, daughter  Family history of bronchitis parents    ROS:  All other systems negative   Constitutional: no fever, no chills  Eye: no vision changes  ENT: no sore throat  Cardiovascular: no chest pain, no palpitation  Respiratory: SOB and cough   GI:  no abd pain, no vomiting, no diarrhea  urinary: no dysuria, no hematuria, no flank pain  musculoskeletal: no joint pain, no joint swelling  skin: no rash  neurology: no headache, no change in mental status  psych: no anxiety    Physical Exam:  General: awake, alert, non toxic  Head: atraumatic, normocephalic  Eyes: normal sclera and conjunctiva  ENT: no LAD, neck supple  Cardio: regular rate   Respiratory: nonlabored on room air, diffuse loud crackles, no wheezing  abd: soft, bowel sounds present, not tender  : no suprapubic tenderness, no francisco  Musculoskeletal: no joint swelling, no edema  Skin: no rash  vascular: no phlebitis  Neurologic: no focal deficits  psych: normal affect       Drug Dosing Weight  Height (cm): 166.4 (31 Mar 2021 17:42)  Weight (kg): 63.503 (01 Apr 2021 07:17)  BMI (kg/m2): 22.9 (01 Apr 2021 07:17)  BSA (m2): 1.71 (01 Apr 2021 07:17)    Vital Signs Last 24 Hrs  T(F): 98 (04-08-21 @ 09:15), Max: 99 (04-07-21 @ 09:27)    Vital Signs Last 24 Hrs  HR: 94 (04-08-21 @ 14:55) (73 - 98)  BP: 144/66 (04-08-21 @ 09:15) (130/62 - 152/65)  RR: 18 (04-08-21 @ 09:15)  SpO2: 98% (04-08-21 @ 14:55) (98% - 100%)  Wt(kg): --                          11.9   9.36  )-----------( 194      ( 08 Apr 2021 07:36 )             37.9       04-08    135  |  97<L>  |  14  ----------------------------<  154<H>  3.7   |  31  |  0.50    Ca    8.6      08 Apr 2021 07:36  Phos  3.1     04-08  Mg     2.1     04-08    TPro  x   /  Alb  x   /  TBili  0.2  /  DBili  x   /  AST  x   /  ALT  x   /  AlkPhos  x   04-08          MICROBIOLOGY:  Culture - Sputum (collected 04-04-21 @ 14:28)  Source: .Sputum Sputum  Gram Stain (04-04-21 @ 19:59):    Rare Squamous epithelial cells per low power field    Rare polymorphonuclear leukocytes per low power field    Rare Yeast like cells per oil power field    Rare Gram positive cocci in pairs per oil power field    Rare Gram Variable Rods per oil power field  Final Report (04-08-21 @ 12:23):    Rare Pseudomonas aeruginosa (Carbapenem Resistant)    Normal Respiratory Denise present  Organism: Pseudomonas aeruginosa (Carbapenem Resistant) (04-08-21 @ 14:24)      -  Amikacin: S <=16      -  Aztreonam: R >16      -  Cefepime: R >16      -  Ceftazidime: S 4      -  Ceftazidime/Avibactam: S <=4      -  Ceftolozane/tazobactam: S <=2      -  Ciprofloxacin: R >2      -  Gentamicin: S 4      -  Imipenem: R >8      -  Levofloxacin: R >4      -  Meropenem: I 4      -  Piperacillin/Tazobactam: I 32      -  Tobramycin: S <=2      Method Type: ELIEZER  Organism: Pseudomonas aeruginosa (Carbapenem Resistant) (04-08-21 @ 12:23)    Culture - Urine (collected 04-01-21 @ 15:58)  Source: .Urine Clean Catch (Midstream)  Final Report (04-03-21 @ 01:38):    >=3 organisms. Probable collection contamination.    RADIOLOGY:  Images below reviewed personally  CT Chest No Cont (03.31.21 @ 21:18)   When compared to5/14/2019, new groundglass opacities and superimposed areas of consolidation seen in the left apex and anterior portion of the left upper lobe concerning for pneumonia.  Chronic findings of bronchiectasis/bronchial wall thickening and mucoid impacted airways..    US Abdomen Limited (04.06.21 @ 09:09)   Cirrhotic liver.

## 2021-04-08 NOTE — CONSULT NOTE ADULT - PROBLEM SELECTOR RECOMMENDATION 9
1. Consider PPI  2. Swallow study for diet recs  3. No ENT intervention at this time. Call ENT service with any additional questions or concerns. Patient was scoped with ENT resident and discussed case and plan.

## 2021-04-08 NOTE — PROGRESS NOTE ADULT - ASSESSMENT
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  She had H/O mild elevation of liver enzymes before since 2018.  , INR 0.98 US abdomen shows nodular liver, no ascites, spleen not mentioned.  No alcohol use.  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  CLD work up: HBsAG neg, HbcIgM neg.  HIV neg    Rec:  get MRI w/wo con and MRCP to evaluate for liver and biliary tract  Get HCV RNA PCR quantitative as HCV Ab+ may be previous exposure or false positive.  Please perform a CLD work up which includes Anti HEV, Serum Ferritin, Transferrin Saturation, Ceruloplasmin level, RUPESH, SMA, immunoglobulins panel, AMA, Anti LK microsomal Ab, anti-soluble liver Ag.  get lipid panel, HGA1c to assess for metabolic syndrome  get CA 19-9, AFP  will follow up.

## 2021-04-08 NOTE — PROGRESS NOTE ADULT - PROBLEM SELECTOR PLAN 2
Pt started taking prednisone 20mg BID on her own about two weeks ago.  -  Cont prednisone daily dosing now per pulm recs  dry mouth - trial Nystatin, encourage PO fluids  c/o throat pain no thrush noted  will get cine

## 2021-04-08 NOTE — PROGRESS NOTE ADULT - ASSESSMENT
65 yo F PMH cystic bronchiectasis, PCD, ABPA, prior pseudomonas , stenotrophomonas pna, HTN, and T2DM presenting for subacute worsening shortness of breath and fevers c/f pna    # Cystic bronchiectasis 2/2 to PCD,  # Chronic pseudomass infection  # Wheezing  - CT w/ new KELLY GGOs  - agree w/ empiric coverage w/ zosyn; sputum cx with rare pseudomonas f/u speciation.   - Bcx NGTD  - Can C/W predniosone 20mg daily.   - C/W Symbicort BID  - c/w fluticasone  - c/w montelukast  - Spiriva on discharge, change duonebs to q8.  - c/w inhaled tobramycin  - C/W zosyn, f/u sensitivities Will likely need 10-14 days of treatment.   - Can start hypersal nebulized TID with pre-treatment with duonebs, Chest PT. Aerobika for secretion   - OOB, incentive josse  - Consider MBS for complaint of dysphagia  - Weaned O2, now on RA.     - On discharge patient will need follow up with Dr. Young. Needs outpatient pulmonary follow up upon discharge at [address] [place] [address], [address] ([phone]).     Dae Hyeon Kim [id number]  Pulmonary Critical Care Fellow  Pager [phone] 65 yo F PMH cystic bronchiectasis, PCD, ABPA, prior pseudomonas , stenotrophomonas pna, HTN, and T2DM presenting for subacute worsening shortness of breath and fevers c/f pna    # Cystic bronchiectasis 2/2 to PCD,  # Chronic pseudomass infection  # Wheezing  - CT w/ new KELLY GGOs  - agree w/ empiric coverage w/ zosyn; sputum cx with rare pseudomonas f/u speciation.   - Bcx NGTD  - Can C/W predniosone 20mg daily.   - C/W Symbicort BID  - c/w fluticasone  - c/w montelukast  - Spiriva on discharge, change duonebs to q8.  - c/w inhaled tobramycin  - C/W zosyn, f/u sensitivities Will likely need 10-14 days of treatment.   - Can start hypersal nebulized TID with pre-treatment with duonebs, Chest PT. Aerobika for secretion   - OOB, incentive josse  - Consider MBS for complaint of dysphagia  - Weaned O2, now on RA.   - Of note patient has history of ABPA but no documentation outpatient and with negative aspergillus ag.    - On discharge patient will need follow up with Dr. Young. Needs outpatient pulmonary follow up upon discharge at [address] [place] [address], [address] ([phone]).     Dae Hyeon Kim MD-[id number]  Pulmonary Critical Care Fellow  Pager [phone]

## 2021-04-08 NOTE — CONSULT NOTE ADULT - SUBJECTIVE AND OBJECTIVE BOX
CC: dysphagia     HPI: 66 year old female admitted to [facility] with Pna having C/O dysphagia. Patient denies hoarseness, SOB, dyspnea, cough, fevers, chills. Denies choking on food or liquids.       PAST MEDICAL & SURGICAL HISTORY:  ABPA (allergic bronchopulmonary aspergillosis)    Bronchiectasis    Hypertension    Vitamin D deficiency    Microcytic anemia    GERD (gastroesophageal reflux disease)    Postnasal drip    Pseudomonas aeruginosa colonization    Allergic rhinitis    Recurrent pneumonia    Type 2 diabetes mellitus, with long-term current use of insulin    History of cholecystectomy      Allergies    No Known Allergies    Intolerances      MEDICATIONS  (STANDING):  ALBUTerol    90 MICROgram(s) HFA Inhaler 1 Puff(s) Inhalation every 6 hours  albuterol/ipratropium for Nebulization 3 milliLiter(s) Nebulizer every 8 hours  benzocaine 15 mG/menthol 3.6 mG (Sugar-Free) Lozenge 1 Lozenge Oral every 6 hours  benzonatate 100 milliGRAM(s) Oral three times a day  budesonide 160 MICROgram(s)/formoterol 4.5 MICROgram(s) Inhaler 2 Puff(s) Inhalation two times a day  calcium carbonate 1250 mG  + Vitamin D (OsCal 500 + D) 1 Tablet(s) Oral daily  ceftazidime/avibactam IVPB 2.5 Gram(s) IV Intermittent every 8 hours  cyanocobalamin Injectable 1000 MICROGram(s) IntraMuscular <User Schedule>  dextrose 40% Gel 15 Gram(s) Oral once  dextrose 5%. 1000 milliLiter(s) (50 mL/Hr) IV Continuous <Continuous>  dextrose 5%. 1000 milliLiter(s) (100 mL/Hr) IV Continuous <Continuous>  dextrose 50% Injectable 25 Gram(s) IV Push once  dextrose 50% Injectable 12.5 Gram(s) IV Push once  dextrose 50% Injectable 25 Gram(s) IV Push once  dextrose 50% Injectable 25 Gram(s) IV Push once  enoxaparin Injectable 40 milliGRAM(s) SubCutaneous daily  ferrous    sulfate 325 milliGRAM(s) Oral daily  FIRST- Mouthwash  BLM 10 milliLiter(s) Swish and Spit every 8 hours  fluticasone propionate 50 MICROgram(s)/spray Nasal Spray 1 Spray(s) Both Nostrils <User Schedule>  glucagon  Injectable 1 milliGRAM(s) IntraMuscular once  influenza   Vaccine 0.5 milliLiter(s) IntraMuscular once  insulin glargine Injectable (LANTUS) 22 Unit(s) SubCutaneous at bedtime  insulin lispro (ADMELOG) corrective regimen sliding scale   SubCutaneous three times a day before meals  insulin lispro (ADMELOG) corrective regimen sliding scale   SubCutaneous at bedtime  insulin lispro Injectable (ADMELOG) 5 Unit(s) SubCutaneous three times a day before meals  LORazepam     Tablet 1 milliGRAM(s) Oral once  montelukast 10 milliGRAM(s) Oral daily  nystatin    Suspension 742012 Unit(s) Swish and Swallow four times a day  pantoprazole    Tablet 40 milliGRAM(s) Oral before breakfast  polyethylene glycol 3350 17 Gram(s) Oral daily  predniSONE   Tablet 20 milliGRAM(s) Oral daily  senna 2 Tablet(s) Oral at bedtime  sodium chloride 7% Inhalation 4 milliLiter(s) Inhalation every 8 hours  tiotropium 18 MICROgram(s) Capsule 1 Capsule(s) Inhalation daily  tobramycin for Nebulization 300 milliGRAM(s) Inhalation every 12 hours  venlafaxine XR. 37.5 milliGRAM(s) Oral daily    MEDICATIONS  (PRN):  acetaminophen   Tablet .. 650 milliGRAM(s) Oral every 6 hours PRN Mild Pain (1 - 3)    ROS:   ENT: all negative except as noted in HPI   CV: denies palpitations  Pulm: denies SOB, cough, hemoptysis  GI: denies change in apetite, indigestion, n/v  : denies pertinent urinary symptoms, urgency  Neuro: denies numbness/tingling, loss of sensation  Psych: denies anxiety  MS: denies muscle weakness, instability  Heme: denies easy bruising or bleeding  Endo: denies heat/cold intolerance, excessive sweating  Vascular: denies LE edema    Vital Signs Last 24 Hrs  T(C): 36.8 (08 Apr 2021 22:01), Max: 36.8 (08 Apr 2021 13:00)  T(F): 98.2 (08 Apr 2021 22:01), Max: 98.2 (08 Apr 2021 13:00)  HR: 72 (08 Apr 2021 22:01) (72 - 98)  BP: 138/- (08 Apr 2021 22:01) (136/72 - 144/66)  BP(mean): 70 (08 Apr 2021 22:01) (70 - 70)  RR: 17 (08 Apr 2021 22:01) (16 - 18)  SpO2: 95% (08 Apr 2021 22:01) (95% - 100%)                          11.9   9.36  )-----------( 194      ( 08 Apr 2021 07:36 )             37.9    04-08    135  |  97<L>  |  14  ----------------------------<  154<H>  3.7   |  31  |  0.50    Ca    8.6      08 Apr 2021 07:36  Phos  3.1     04-08  Mg     2.1     04-08    TPro  x   /  Alb  x   /  TBili  0.2  /  DBili  x   /  AST  x   /  ALT  x   /  AlkPhos  x   04-08   PT/INR - ( 08 Apr 2021 07:36 )   PT: 11.3 sec;   INR: 0.98 ratio         PTT - ( 08 Apr 2021 07:36 )  PTT:22.6 sec    PHYSICAL EXAM:  Gen: NAD  Skin: No rashes, bruises, or lesions  Head: Normocephalic, Atraumatic  Face: no edema, erythema, or fluctuance. Parotid glands soft without mass  Eyes: no scleral injection  Ears: Right - ear canal clear, TM intact without effusion or erythema. No evidence of any fluid drainage. No mastoid tenderness, erythema, or ear bulging            Left - ear canal clear, TM intact without effusion or erythema. No evidence of any fluid drainage. No mastoid tenderness, erythema, or ear bulging  Nose: Nares bilaterally patent, no discharge  Mouth: No Stridor / Drooling / Trismus.  Mucosa moist, tongue/uvula midline, oropharynx clear  Neck: Flat, supple, no lymphadenopathy, trachea midline, no masses  Lymphatic: No lymphadenopathy  Resp: breathing easily, no stridor  CV: no peripheral edema/cyanosis  GI: nondistended   Peripheral vascular: no JVD or edema  Neuro: facial nerve intact, no facial droop      Diagnostic Nasal Endoscopy: no nasal polyps appreciated, no bleeding,     Fiberoptic Indirect laryngoscopy: No Base of tongue masses, Epiglottis sharp, no masses, No edema, Vocal cords mobile with mild erythema.     IMAGING/ADDITIONAL STUDIES:

## 2021-04-08 NOTE — PROGRESS NOTE ADULT - SUBJECTIVE AND OBJECTIVE BOX
Patient is a 66y old  Female who presents with a chief complaint of Pneumonia (08 Apr 2021 07:32)       Admitted on: 03-31-21    We are following the patient for concern for cirrhosis on imaging.      Interval History: Patient is doing well. No overnight events        PAST MEDICAL & SURGICAL HISTORY:  ABPA (allergic bronchopulmonary aspergillosis)    Bronchiectasis    Hypertension    Vitamin D deficiency    Microcytic anemia    GERD (gastroesophageal reflux disease)    Postnasal drip    Pseudomonas aeruginosa colonization    Allergic rhinitis    Recurrent pneumonia    Type 2 diabetes mellitus, with long-term current use of insulin    History of cholecystectomy        MEDICATIONS  (STANDING):  ALBUTerol    90 MICROgram(s) HFA Inhaler 1 Puff(s) Inhalation every 6 hours  albuterol/ipratropium for Nebulization 3 milliLiter(s) Nebulizer every 6 hours  benzocaine 15 mG/menthol 3.6 mG (Sugar-Free) Lozenge 1 Lozenge Oral every 6 hours  benzonatate 100 milliGRAM(s) Oral three times a day  budesonide 160 MICROgram(s)/formoterol 4.5 MICROgram(s) Inhaler 2 Puff(s) Inhalation two times a day  calcium carbonate 1250 mG  + Vitamin D (OsCal 500 + D) 1 Tablet(s) Oral daily  cyanocobalamin Injectable 1000 MICROGram(s) IntraMuscular <User Schedule>  dextrose 40% Gel 15 Gram(s) Oral once  dextrose 5%. 1000 milliLiter(s) (50 mL/Hr) IV Continuous <Continuous>  dextrose 5%. 1000 milliLiter(s) (100 mL/Hr) IV Continuous <Continuous>  dextrose 50% Injectable 25 Gram(s) IV Push once  dextrose 50% Injectable 12.5 Gram(s) IV Push once  dextrose 50% Injectable 25 Gram(s) IV Push once  dextrose 50% Injectable 25 Gram(s) IV Push once  enoxaparin Injectable 40 milliGRAM(s) SubCutaneous daily  ferrous    sulfate 325 milliGRAM(s) Oral daily  FIRST- Mouthwash  BLM 10 milliLiter(s) Swish and Spit every 8 hours  fluticasone propionate 50 MICROgram(s)/spray Nasal Spray 1 Spray(s) Both Nostrils <User Schedule>  glucagon  Injectable 1 milliGRAM(s) IntraMuscular once  influenza   Vaccine 0.5 milliLiter(s) IntraMuscular once  insulin glargine Injectable (LANTUS) 22 Unit(s) SubCutaneous at bedtime  insulin lispro (ADMELOG) corrective regimen sliding scale   SubCutaneous three times a day before meals  insulin lispro (ADMELOG) corrective regimen sliding scale   SubCutaneous at bedtime  insulin lispro Injectable (ADMELOG) 5 Unit(s) SubCutaneous three times a day before meals  montelukast 10 milliGRAM(s) Oral daily  nystatin    Suspension 511239 Unit(s) Swish and Swallow four times a day  pantoprazole    Tablet 40 milliGRAM(s) Oral before breakfast  polyethylene glycol 3350 17 Gram(s) Oral daily  predniSONE   Tablet 20 milliGRAM(s) Oral daily  senna 2 Tablet(s) Oral at bedtime  sodium chloride 7% Inhalation 4 milliLiter(s) Inhalation every 8 hours  tiotropium 18 MICROgram(s) Capsule 1 Capsule(s) Inhalation daily  tobramycin for Nebulization 300 milliGRAM(s) Inhalation every 12 hours  venlafaxine XR. 37.5 milliGRAM(s) Oral daily    MEDICATIONS  (PRN):  acetaminophen   Tablet .. 650 milliGRAM(s) Oral every 6 hours PRN Mild Pain (1 - 3)      Allergies  No Known Allergies      Review of Systems:   Cardiovascular:  No Chest Pain, No Palpitations  Respiratory: Dyspnea  Gastrointestinal:  As described in HPI    Physical Examination:  T(C): 36.7 (04-08-21 @ 05:15), Max: 36.8 (04-07-21 @ 18:38)  HR: 78 (04-08-21 @ 05:15) (73 - 86)  BP: 140/74 (04-08-21 @ 05:15) (118/54 - 152/65)  RR: 16 (04-08-21 @ 05:15) (16 - 18)  SpO2: 98% (04-08-21 @ 05:15) (98% - 100%)      04-07-21 @ 07:01  -  04-08-21 @ 07:00  --------------------------------------------------------  IN: 650 mL / OUT: 0 mL / NET: 650 mL      Constitutional: Lying in bed.  Respiratory:  On NC oxygen   Cardiovascular:  S1 S2, Regular rate and rhythm.  Abdominal: Abdomen is soft, symmetric, and non-tender without distention.   Skin: No rashes, No Jaundice.        Data: (reviewed by attending)                        11.9   9.36  )-----------( 194      ( 08 Apr 2021 07:36 )             37.9     Hgb trend:  11.9  04-08-21 @ 07:36  12.1  04-07-21 @ 06:58  11.6  04-06-21 @ 07:54        04-08    135  |  97<L>  |  14  ----------------------------<  154<H>  3.7   |  31  |  0.50    Ca    8.6      08 Apr 2021 07:36  Phos  3.1     04-08  Mg     2.1     04-08    TPro  x   /  Alb  x   /  TBili  0.2  /  DBili  x   /  AST  x   /  ALT  x   /  AlkPhos  x   04-08    Liver panel trend:  TBili 0.2   /   AST --   /   ALT --   /   AlkP --   /   Tptn --   /   Alb --    /   DBili --      04-08  TBili 0.2   /   AST 88   /      /   AlkP 193   /   Tptn 6.2   /   Alb 3.2    /   DBili <0.2      04-08  TBili 0.2   /   AST 65   /   ALT 95   /   AlkP 174   /   Tptn 6.3   /   Alb 3.0    /   DBili --      04-07  TBili 0.2   /   AST --   /   ALT --   /   AlkP --   /   Tptn --   /   Alb --    /   DBili --      04-06  TBili 0.2   /   AST 63   /   ALT 92   /   AlkP 187   /   Tptn 6.2   /   Alb 3.0    /   DBili <0.2      04-06  TBili <0.2   /   AST 45   /   ALT 73   /   AlkP 187   /   Tptn 6.1   /   Alb 3.0    /   DBili --      04-05  TBili 0.2   /   AST 37   /   ALT 60   /   AlkP 181   /   Tptn 6.1   /   Alb 2.9    /   DBili --      04-04  TBili 0.2   /   AST 46   /   ALT 58   /   AlkP 195   /   Tptn 6.4   /   Alb 3.0    /   DBili --      04-03  TBili 0.3   /   AST 52   /   ALT 55   /   AlkP 202   /   Tptn 6.4   /   Alb 3.1    /   DBili --      04-02  TBili 0.3   /   AST 26   /   ALT 38   /   AlkP 184   /   Tptn 7.1   /   Alb 3.3    /   DBili --      04-01  TBili <0.2   /   AST 27   /   ALT 41   /   AlkP 189   /   Tptn 6.9   /   Alb 3.3    /   DBili --      03-31      PT/INR - ( 08 Apr 2021 07:36 )   PT: 11.3 sec;   INR: 0.98 ratio         PTT - ( 08 Apr 2021 07:36 )  PTT:22.6 sec       Radiology: (reviewed by attending)

## 2021-04-09 DIAGNOSIS — D72.10 EOSINOPHILIA, UNSPECIFIED: ICD-10-CM

## 2021-04-09 LAB
ALBUMIN SERPL ELPH-MCNC: 3 G/DL — LOW (ref 3.3–5)
ALP SERPL-CCNC: 201 U/L — HIGH (ref 40–120)
ALT FLD-CCNC: 116 U/L — HIGH (ref 4–33)
ANA TITR SER: NEGATIVE — SIGNIFICANT CHANGE UP
ANION GAP SERPL CALC-SCNC: 7 MMOL/L — SIGNIFICANT CHANGE UP (ref 7–14)
AST SERPL-CCNC: 62 U/L — HIGH (ref 4–32)
BILIRUB SERPL-MCNC: 0.2 MG/DL — SIGNIFICANT CHANGE UP (ref 0.2–1.2)
BUN SERPL-MCNC: 16 MG/DL — SIGNIFICANT CHANGE UP (ref 7–23)
CALCIUM SERPL-MCNC: 9 MG/DL — SIGNIFICANT CHANGE UP (ref 8.4–10.5)
CHLORIDE SERPL-SCNC: 100 MMOL/L — SIGNIFICANT CHANGE UP (ref 98–107)
CO2 SERPL-SCNC: 29 MMOL/L — SIGNIFICANT CHANGE UP (ref 22–31)
CREAT SERPL-MCNC: 0.49 MG/DL — LOW (ref 0.5–1.3)
GLUCOSE BLDC GLUCOMTR-MCNC: 120 MG/DL — HIGH (ref 70–99)
GLUCOSE BLDC GLUCOMTR-MCNC: 123 MG/DL — HIGH (ref 70–99)
GLUCOSE BLDC GLUCOMTR-MCNC: 139 MG/DL — HIGH (ref 70–99)
GLUCOSE BLDC GLUCOMTR-MCNC: 214 MG/DL — HIGH (ref 70–99)
GLUCOSE SERPL-MCNC: 84 MG/DL — SIGNIFICANT CHANGE UP (ref 70–99)
HCV RNA SERPL NAA DL=5-ACNC: SIGNIFICANT CHANGE UP IU/ML
HCV RNA SPEC NAA+PROBE-LOG IU: SIGNIFICANT CHANGE UP LOG10IU/ML
IGA FLD-MCNC: 434 MG/DL — SIGNIFICANT CHANGE UP (ref 84–499)
IGG FLD-MCNC: 1073 MG/DL — SIGNIFICANT CHANGE UP (ref 610–1660)
IGM SERPL-MCNC: 97 MG/DL — SIGNIFICANT CHANGE UP (ref 35–242)
INR BLD: 0.97 RATIO — SIGNIFICANT CHANGE UP (ref 0.88–1.16)
KAPPA LC SER QL IFE: 2.57 MG/DL — HIGH (ref 0.33–1.94)
KAPPA/LAMBDA FREE LIGHT CHAIN RATIO, SERUM: 1.1 RATIO — SIGNIFICANT CHANGE UP (ref 0.26–1.65)
LAMBDA LC SER QL IFE: 2.33 MG/DL — SIGNIFICANT CHANGE UP (ref 0.57–2.63)
LKM AB SER-ACNC: <20.1 UNITS — SIGNIFICANT CHANGE UP (ref 0–20)
MITOCHONDRIA AB SER-ACNC: SIGNIFICANT CHANGE UP
POTASSIUM SERPL-MCNC: 3.8 MMOL/L — SIGNIFICANT CHANGE UP (ref 3.5–5.3)
POTASSIUM SERPL-SCNC: 3.8 MMOL/L — SIGNIFICANT CHANGE UP (ref 3.5–5.3)
PROT SERPL-MCNC: 6.5 G/DL — SIGNIFICANT CHANGE UP (ref 6–8.3)
PROTHROM AB SERPL-ACNC: 11.1 SEC — SIGNIFICANT CHANGE UP (ref 10.6–13.6)
SODIUM SERPL-SCNC: 136 MMOL/L — SIGNIFICANT CHANGE UP (ref 135–145)
STREP DNASE B TITR SER: 81 U/ML — SIGNIFICANT CHANGE UP (ref 0–120)

## 2021-04-09 PROCEDURE — 74230 X-RAY XM SWLNG FUNCJ C+: CPT | Mod: 26

## 2021-04-09 PROCEDURE — 99232 SBSQ HOSP IP/OBS MODERATE 35: CPT | Mod: GC

## 2021-04-09 PROCEDURE — 99233 SBSQ HOSP IP/OBS HIGH 50: CPT

## 2021-04-09 RX ADMIN — Medication 3 MILLILITER(S): at 07:50

## 2021-04-09 RX ADMIN — Medication 5 UNIT(S): at 09:47

## 2021-04-09 RX ADMIN — BUDESONIDE AND FORMOTEROL FUMARATE DIHYDRATE 2 PUFF(S): 160; 4.5 AEROSOL RESPIRATORY (INHALATION) at 09:49

## 2021-04-09 RX ADMIN — Medication 3 MILLILITER(S): at 23:01

## 2021-04-09 RX ADMIN — SODIUM CHLORIDE 4 MILLILITER(S): 9 INJECTION INTRAMUSCULAR; INTRAVENOUS; SUBCUTANEOUS at 23:02

## 2021-04-09 RX ADMIN — Medication 37.5 MILLIGRAM(S): at 14:30

## 2021-04-09 RX ADMIN — Medication 500000 UNIT(S): at 22:49

## 2021-04-09 RX ADMIN — Medication 325 MILLIGRAM(S): at 14:29

## 2021-04-09 RX ADMIN — Medication 1 TABLET(S): at 14:28

## 2021-04-09 RX ADMIN — SODIUM CHLORIDE 4 MILLILITER(S): 9 INJECTION INTRAMUSCULAR; INTRAVENOUS; SUBCUTANEOUS at 07:52

## 2021-04-09 RX ADMIN — BENZOCAINE AND MENTHOL 1 LOZENGE: 5; 1 LIQUID ORAL at 06:17

## 2021-04-09 RX ADMIN — Medication 2: at 14:24

## 2021-04-09 RX ADMIN — SENNA PLUS 2 TABLET(S): 8.6 TABLET ORAL at 22:49

## 2021-04-09 RX ADMIN — POLYETHYLENE GLYCOL 3350 17 GRAM(S): 17 POWDER, FOR SOLUTION ORAL at 14:30

## 2021-04-09 RX ADMIN — BUDESONIDE AND FORMOTEROL FUMARATE DIHYDRATE 2 PUFF(S): 160; 4.5 AEROSOL RESPIRATORY (INHALATION) at 23:00

## 2021-04-09 RX ADMIN — Medication 20 MILLIGRAM(S): at 06:14

## 2021-04-09 RX ADMIN — Medication 5 UNIT(S): at 18:08

## 2021-04-09 RX ADMIN — DIPHENHYDRAMINE HYDROCHLORIDE AND LIDOCAINE HYDROCHLORIDE AND ALUMINUM HYDROXIDE AND MAGNESIUM HYDRO 10 MILLILITER(S): KIT at 06:15

## 2021-04-09 RX ADMIN — BENZOCAINE AND MENTHOL 1 LOZENGE: 5; 1 LIQUID ORAL at 14:26

## 2021-04-09 RX ADMIN — Medication 500000 UNIT(S): at 06:14

## 2021-04-09 RX ADMIN — Medication 1 SPRAY(S): at 14:29

## 2021-04-09 RX ADMIN — Medication 100 MILLIGRAM(S): at 06:14

## 2021-04-09 RX ADMIN — Medication 300 MILLIGRAM(S): at 23:01

## 2021-04-09 RX ADMIN — DIPHENHYDRAMINE HYDROCHLORIDE AND LIDOCAINE HYDROCHLORIDE AND ALUMINUM HYDROXIDE AND MAGNESIUM HYDRO 10 MILLILITER(S): KIT at 22:49

## 2021-04-09 RX ADMIN — BENZOCAINE AND MENTHOL 1 LOZENGE: 5; 1 LIQUID ORAL at 18:09

## 2021-04-09 RX ADMIN — Medication 100 MILLIGRAM(S): at 22:48

## 2021-04-09 RX ADMIN — PANTOPRAZOLE SODIUM 40 MILLIGRAM(S): 20 TABLET, DELAYED RELEASE ORAL at 06:14

## 2021-04-09 RX ADMIN — Medication 500000 UNIT(S): at 18:09

## 2021-04-09 RX ADMIN — Medication 100 MILLIGRAM(S): at 14:27

## 2021-04-09 RX ADMIN — Medication 500000 UNIT(S): at 09:48

## 2021-04-09 RX ADMIN — INSULIN GLARGINE 22 UNIT(S): 100 INJECTION, SOLUTION SUBCUTANEOUS at 22:47

## 2021-04-09 RX ADMIN — DIPHENHYDRAMINE HYDROCHLORIDE AND LIDOCAINE HYDROCHLORIDE AND ALUMINUM HYDROXIDE AND MAGNESIUM HYDRO 10 MILLILITER(S): KIT at 14:28

## 2021-04-09 RX ADMIN — Medication 3 MILLILITER(S): at 15:04

## 2021-04-09 RX ADMIN — Medication 5 UNIT(S): at 14:25

## 2021-04-09 RX ADMIN — BENZOCAINE AND MENTHOL 1 LOZENGE: 5; 1 LIQUID ORAL at 23:38

## 2021-04-09 RX ADMIN — MONTELUKAST 10 MILLIGRAM(S): 4 TABLET, CHEWABLE ORAL at 14:29

## 2021-04-09 RX ADMIN — ENOXAPARIN SODIUM 40 MILLIGRAM(S): 100 INJECTION SUBCUTANEOUS at 14:28

## 2021-04-09 NOTE — SWALLOW VFSS/MBS ASSESSMENT ADULT - PHARYNGEAL PHASE COMMENTS
Mild swallow trigger delay, mildly reduced base of tongue retraction and mildly reduced hyolaryngeal elevation. No penetration or aspiration viewed. Trace residual in the BOT post swallow. Mild swallow trigger delay, mildly reduced base of tongue retraction and mildly reduced hyolaryngeal elevation. There was trace silent penetration with retrieval for thin liquids during large uncontrolled bolus consumption. This was not replicated during subsequent trials of controlled smaller cup sips of thin liquids. Trace residual in the BOT post swallow.

## 2021-04-09 NOTE — PROGRESS NOTE ADULT - PROBLEM SELECTOR PLAN 4
will trend lfts, hep panel with Hep C reactive, hiv neg, waiting for further pending labs, US showed cirrhotic liver,   Appreciate hepatology consult, wkup per hepatology, labs pending  Ca 19-9 mildly elevated at 42, f/u with hepatology  ceruloplasmin normal, alpha fetoprotein normal  - mrcp pending  - daughter Inderdeep updated on 4/9

## 2021-04-09 NOTE — SWALLOW VFSS/MBS ASSESSMENT ADULT - SLP GENERAL OBSERVATIONS
Pt is Leandro speaking. Pt called her daughter Brian at bedside who translated per request of patient. Pt sat upright in the [facility] chair in the lateral plane in the Radiolgy Suite. Pt was in NAD, on nasal cannula, with baseline productive cough. Pt w/ adequate ability to follow commands, communicating verbally. Pt c/o of sore throat, throat pain but denies difficulty swallowing. Reports consumes a regular solid diet with thin liquids in home environment.

## 2021-04-09 NOTE — SWALLOW VFSS/MBS ASSESSMENT ADULT - SPECIFY REASON(S)
To objectively assess oropharyngeal swallow function with plan to implement the least restrictive solid and liquid diet

## 2021-04-09 NOTE — PROVIDER CONTACT NOTE (CRITICAL VALUE NOTIFICATION) - ASSESSMENT
AOX4. Pt presents with productive cough with white-pale sputum. Pt given standing respiratory inhalation medications and cough medication. Pt remains in an isolated room with contact precautions. Vitals are stable. Pt saturating above 90% with Nasal Cannula at 1LPM. No s/s of distress.

## 2021-04-09 NOTE — SWALLOW VFSS/MBS ASSESSMENT ADULT - COMMENTS
H&P: 66 y.o. 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 Chronic findings of bronchiectasis/bronchial wall thickening and mucoid impacted airways.    ENT 4/8/21-> “Fiberoptic Indirect laryngoscopy: No Base of tongue masses, Epiglottis sharp, no masses, No edema, Vocal cords mobile with mild erythema.”    Speech/swallow evaluation at bedside 4/6/21-> Rx soft solids and thin fluids. See full report in chart.

## 2021-04-09 NOTE — SWALLOW VFSS/MBS ASSESSMENT ADULT - ORAL PHASE
Delayed oral transit time/Reduced anterior - posterior transport/Uncontrolled bolus / spillover in paramjit-pharynx Premature spillage to the pyriform sinuses/Delayed oral transit time/Reduced anterior - posterior transport/Uncontrolled bolus / spillover in hypopharynx Rapid AP transport and premature spillage to the pyriform sinuses

## 2021-04-09 NOTE — PROGRESS NOTE ADULT - SUBJECTIVE AND OBJECTIVE BOX
Patient is a 66y old  Female who presents with a chief complaint of Pneumonia (09 Apr 2021 10:36)      SUBJECTIVE / OVERNIGHT EVENTS: Pt seen and examined at 2pm, no overnight events, spoke through pt's daughter Ardiana as per pt's request, still with productive cough expectorating a lot of sputum, no sob or any other complaints, explained that she is waiting for an MRI. Nsg trying to remove nose ring of the patient, no other new issues reported.      MEDICATIONS  (STANDING):  ALBUTerol    90 MICROgram(s) HFA Inhaler 1 Puff(s) Inhalation every 6 hours  albuterol/ipratropium for Nebulization 3 milliLiter(s) Nebulizer every 8 hours  benzocaine 15 mG/menthol 3.6 mG (Sugar-Free) Lozenge 1 Lozenge Oral every 6 hours  benzonatate 100 milliGRAM(s) Oral three times a day  budesonide 160 MICROgram(s)/formoterol 4.5 MICROgram(s) Inhaler 2 Puff(s) Inhalation two times a day  calcium carbonate 1250 mG  + Vitamin D (OsCal 500 + D) 1 Tablet(s) Oral daily  ceftazidime/avibactam IVPB 2.5 Gram(s) IV Intermittent every 8 hours  cyanocobalamin Injectable 1000 MICROGram(s) IntraMuscular <User Schedule>  dextrose 40% Gel 15 Gram(s) Oral once  dextrose 5%. 1000 milliLiter(s) (50 mL/Hr) IV Continuous <Continuous>  dextrose 5%. 1000 milliLiter(s) (100 mL/Hr) IV Continuous <Continuous>  dextrose 50% Injectable 25 Gram(s) IV Push once  dextrose 50% Injectable 12.5 Gram(s) IV Push once  dextrose 50% Injectable 25 Gram(s) IV Push once  dextrose 50% Injectable 25 Gram(s) IV Push once  enoxaparin Injectable 40 milliGRAM(s) SubCutaneous daily  ferrous    sulfate 325 milliGRAM(s) Oral daily  FIRST- Mouthwash  BLM 10 milliLiter(s) Swish and Spit every 8 hours  fluticasone propionate 50 MICROgram(s)/spray Nasal Spray 1 Spray(s) Both Nostrils <User Schedule>  glucagon  Injectable 1 milliGRAM(s) IntraMuscular once  influenza   Vaccine 0.5 milliLiter(s) IntraMuscular once  insulin glargine Injectable (LANTUS) 22 Unit(s) SubCutaneous at bedtime  insulin lispro (ADMELOG) corrective regimen sliding scale   SubCutaneous three times a day before meals  insulin lispro (ADMELOG) corrective regimen sliding scale   SubCutaneous at bedtime  insulin lispro Injectable (ADMELOG) 5 Unit(s) SubCutaneous three times a day before meals  LORazepam     Tablet 1 milliGRAM(s) Oral once  montelukast 10 milliGRAM(s) Oral daily  nystatin    Suspension 924199 Unit(s) Swish and Swallow four times a day  pantoprazole    Tablet 40 milliGRAM(s) Oral before breakfast  polyethylene glycol 3350 17 Gram(s) Oral daily  predniSONE   Tablet 20 milliGRAM(s) Oral daily  senna 2 Tablet(s) Oral at bedtime  sodium chloride 7% Inhalation 4 milliLiter(s) Inhalation every 8 hours  tiotropium 18 MICROgram(s) Capsule 1 Capsule(s) Inhalation daily  tobramycin for Nebulization 300 milliGRAM(s) Inhalation every 12 hours  venlafaxine XR. 37.5 milliGRAM(s) Oral daily    MEDICATIONS  (PRN):  acetaminophen   Tablet .. 650 milliGRAM(s) Oral every 6 hours PRN Mild Pain (1 - 3)      Vital Signs Last 24 Hrs  T(C): 36.6 (09 Apr 2021 14:00), Max: 37 (09 Apr 2021 01:15)  T(F): 97.9 (09 Apr 2021 14:00), Max: 98.6 (09 Apr 2021 01:15)  HR: 74 (09 Apr 2021 15:04) (72 - 83)  BP: 123/72 (09 Apr 2021 14:00) (122/67 - 144/80)  BP(mean): 70 (08 Apr 2021 22:01) (70 - 70)  RR: 18 (09 Apr 2021 14:00) (17 - 18)  SpO2: 96% (09 Apr 2021 15:04) (95% - 100%)  CAPILLARY BLOOD GLUCOSE      POCT Blood Glucose.: 214 mg/dL (09 Apr 2021 14:20)  POCT Blood Glucose.: 120 mg/dL (09 Apr 2021 09:44)  POCT Blood Glucose.: 189 mg/dL (08 Apr 2021 23:21)  POCT Blood Glucose.: 86 mg/dL (08 Apr 2021 22:37)  POCT Blood Glucose.: 173 mg/dL (08 Apr 2021 17:22)    I&O's Summary      PHYSICAL EXAM:  GENERAL: NAD, thinly built female  HEENT: no thrush  CHEST/LUNG: +rhonchi b/l, coughs upon deep breath  HEART: Regular rate and rhythm; No murmurs  ABDOMEN: Soft, Nontender, Nondistended  EXTREMITIES: no LE edema  PSYCH: Calm  NEUROLOGY: AAOx3  SKIN: No rashes or lesions    LABS:                        11.9   9.36  )-----------( 194      ( 08 Apr 2021 07:36 )             37.9     04-09    136  |  100  |  16  ----------------------------<  84  3.8   |  29  |  0.49<L>    Ca    9.0      09 Apr 2021 08:08  Phos  3.1     04-08  Mg     2.1     04-08    TPro  6.5  /  Alb  3.0<L>  /  TBili  0.2  /  DBili  x   /  AST  62<H>  /  ALT  116<H>  /  AlkPhos  201<H>  04-09    PT/INR - ( 09 Apr 2021 08:08 )   PT: 11.1 sec;   INR: 0.97 ratio         PTT - ( 08 Apr 2021 07:36 )  PTT:22.6 sec          RADIOLOGY & ADDITIONAL TESTS:    Imaging Personally Reviewed:    Consultant(s) Notes Reviewed:      Care Discussed with Consultants/Other Providers:   Patient is a 66y old  Female who presents with a chief complaint of Pneumonia (09 Apr 2021 10:36)      SUBJECTIVE / OVERNIGHT EVENTS: Pt seen and examined at 2pm, no overnight events, spoke through pt's daughter Adriana as per pt's request, still with productive cough expectorating a lot of sputum, no sob or any other complaints, explained that she is waiting for an MRI. Nsg trying to remove nose ring of the patient, no other new issues reported.      MEDICATIONS  (STANDING):  ALBUTerol    90 MICROgram(s) HFA Inhaler 1 Puff(s) Inhalation every 6 hours  albuterol/ipratropium for Nebulization 3 milliLiter(s) Nebulizer every 8 hours  benzocaine 15 mG/menthol 3.6 mG (Sugar-Free) Lozenge 1 Lozenge Oral every 6 hours  benzonatate 100 milliGRAM(s) Oral three times a day  budesonide 160 MICROgram(s)/formoterol 4.5 MICROgram(s) Inhaler 2 Puff(s) Inhalation two times a day  calcium carbonate 1250 mG  + Vitamin D (OsCal 500 + D) 1 Tablet(s) Oral daily  ceftazidime/avibactam IVPB 2.5 Gram(s) IV Intermittent every 8 hours  cyanocobalamin Injectable 1000 MICROGram(s) IntraMuscular <User Schedule>  dextrose 40% Gel 15 Gram(s) Oral once  dextrose 5%. 1000 milliLiter(s) (50 mL/Hr) IV Continuous <Continuous>  dextrose 5%. 1000 milliLiter(s) (100 mL/Hr) IV Continuous <Continuous>  dextrose 50% Injectable 25 Gram(s) IV Push once  dextrose 50% Injectable 12.5 Gram(s) IV Push once  dextrose 50% Injectable 25 Gram(s) IV Push once  dextrose 50% Injectable 25 Gram(s) IV Push once  enoxaparin Injectable 40 milliGRAM(s) SubCutaneous daily  ferrous    sulfate 325 milliGRAM(s) Oral daily  FIRST- Mouthwash  BLM 10 milliLiter(s) Swish and Spit every 8 hours  fluticasone propionate 50 MICROgram(s)/spray Nasal Spray 1 Spray(s) Both Nostrils <User Schedule>  glucagon  Injectable 1 milliGRAM(s) IntraMuscular once  influenza   Vaccine 0.5 milliLiter(s) IntraMuscular once  insulin glargine Injectable (LANTUS) 22 Unit(s) SubCutaneous at bedtime  insulin lispro (ADMELOG) corrective regimen sliding scale   SubCutaneous three times a day before meals  insulin lispro (ADMELOG) corrective regimen sliding scale   SubCutaneous at bedtime  insulin lispro Injectable (ADMELOG) 5 Unit(s) SubCutaneous three times a day before meals  LORazepam     Tablet 1 milliGRAM(s) Oral once  montelukast 10 milliGRAM(s) Oral daily  nystatin    Suspension 235360 Unit(s) Swish and Swallow four times a day  pantoprazole    Tablet 40 milliGRAM(s) Oral before breakfast  polyethylene glycol 3350 17 Gram(s) Oral daily  predniSONE   Tablet 20 milliGRAM(s) Oral daily  senna 2 Tablet(s) Oral at bedtime  sodium chloride 7% Inhalation 4 milliLiter(s) Inhalation every 8 hours  tiotropium 18 MICROgram(s) Capsule 1 Capsule(s) Inhalation daily  tobramycin for Nebulization 300 milliGRAM(s) Inhalation every 12 hours  venlafaxine XR. 37.5 milliGRAM(s) Oral daily    MEDICATIONS  (PRN):  acetaminophen   Tablet .. 650 milliGRAM(s) Oral every 6 hours PRN Mild Pain (1 - 3)      Vital Signs Last 24 Hrs  T(C): 36.6 (09 Apr 2021 14:00), Max: 37 (09 Apr 2021 01:15)  T(F): 97.9 (09 Apr 2021 14:00), Max: 98.6 (09 Apr 2021 01:15)  HR: 74 (09 Apr 2021 15:04) (72 - 83)  BP: 123/72 (09 Apr 2021 14:00) (122/67 - 144/80)  BP(mean): 70 (08 Apr 2021 22:01) (70 - 70)  RR: 18 (09 Apr 2021 14:00) (17 - 18)  SpO2: 96% (09 Apr 2021 15:04) (95% - 100%)  CAPILLARY BLOOD GLUCOSE      POCT Blood Glucose.: 214 mg/dL (09 Apr 2021 14:20)  POCT Blood Glucose.: 120 mg/dL (09 Apr 2021 09:44)  POCT Blood Glucose.: 189 mg/dL (08 Apr 2021 23:21)  POCT Blood Glucose.: 86 mg/dL (08 Apr 2021 22:37)  POCT Blood Glucose.: 173 mg/dL (08 Apr 2021 17:22)    I&O's Summary      PHYSICAL EXAM:  GENERAL: NAD, thinly built female  CHEST/LUNG: +rhonchi b/l, coughs upon deep breath  HEART: Regular rate and rhythm; No murmurs  ABDOMEN: Soft, Nontender, Nondistended  EXTREMITIES: no LE edema  PSYCH: Calm  NEUROLOGY: AAOx3  SKIN: No rashes or lesions    LABS:                        11.9   9.36  )-----------( 194      ( 08 Apr 2021 07:36 )             37.9     04-09    136  |  100  |  16  ----------------------------<  84  3.8   |  29  |  0.49<L>    Ca    9.0      09 Apr 2021 08:08  Phos  3.1     04-08  Mg     2.1     04-08    TPro  6.5  /  Alb  3.0<L>  /  TBili  0.2  /  DBili  x   /  AST  62<H>  /  ALT  116<H>  /  AlkPhos  201<H>  04-09    PT/INR - ( 09 Apr 2021 08:08 )   PT: 11.1 sec;   INR: 0.97 ratio         PTT - ( 08 Apr 2021 07:36 )  PTT:22.6 sec          RADIOLOGY & ADDITIONAL TESTS:    Imaging Personally Reviewed:    Consultant(s) Notes Reviewed:      Care Discussed with Consultants/Other Providers:

## 2021-04-09 NOTE — PROVIDER CONTACT NOTE (CRITICAL VALUE NOTIFICATION) - BACKGROUND
66 Y.O F admitted for SOB with acute bronchiectasis exacerbation. PMHx of DM2, Recurrent Pnemonia, ABPA.

## 2021-04-09 NOTE — SWALLOW VFSS/MBS ASSESSMENT ADULT - ADDITIONAL RECOMMENDATIONS
1. This dept. to cont. to follow while in house as schedule permits. Please contact this service with any diet tolerance issues.

## 2021-04-09 NOTE — PROGRESS NOTE ADULT - PROBLEM SELECTOR PLAN 2
Pt started taking prednisone 20mg BID on her own about two weeks ago.  -  Cont prednisone daily dosing now per pulm recs  dry mouth - trial Nystatin, encourage PO fluids  c/o throat pain no thrush noted- seen by ENT no further ENT recs

## 2021-04-09 NOTE — PROVIDER CONTACT NOTE (CRITICAL VALUE NOTIFICATION) - TEST AND RESULT REPORTED:
Sputum Culture Final Result Rare Pseudomonas  Aeruginosa; Carbapenem resistant. Normal Respiratory Denise present.

## 2021-04-09 NOTE — SWALLOW VFSS/MBS ASSESSMENT ADULT - DIAGNOSTIC IMPRESSIONS
Pt presents with mild oropharyngeal dysphagia. Oral stage was marked by oral holding of bolus, reduced Ap transport and increased oral transit time with premature spillage to the pyriform sinuses for puree/solids and thin liquids. Pharyngeal stage was marked by swallow trigger delay (~3-4 seconds), slightly reduced base of tongue retraction and slightly reduced hyolaryngeal elevation. There was trace residue in the BOT and valleculae post swallow of puree/solids and thin fluids. There was trace silent penetration with full retrieval of thin liquids during consumption of large uncontrolled cup sip. This was not replicated during consumption of smaller controlled sips from open cup of thin liquids. Additionally, no penetration/aspiration for puree and solids.

## 2021-04-09 NOTE — PROGRESS NOTE ADULT - ASSESSMENT
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  She had H/O mild elevation of liver enzymes before since 2018.  , US abdomen shows nodular liver, no ascites, spleen not mentioned.  No alcohol use.  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  CLD work up: HBsAG neg, HbcIgM neg.    Rec:  get INR  Trend daily liver enzymes and INR  MRI w/wo con and MRCP (ordered) to evaluate for liver and biliary tract  Get HCV RNA PCR quantitative as HCV Ab+ may be previous exposure or false positive.  Please perform a CLD work up which includes Anti HEV, Serum Ferritin, Transferrin Saturation, Ceruloplasmin level, RUPESH, SMA, immunoglobulins panel, AMA, Anti LK microsomal Ab, anti-soluble liver Ag.  get lipid panel, HGA1c to assess for metabolic syndrome  get HIV screening  get CA 19-9, AFP  will follow up.        Thank you for involving us in the care of this patient, please reach out if any further questions.     Ilya Lees MD  Gastroenterology Fellow, PGY4    Available on [employer] Teams  [phone] ([facility])  [id number] ([facility])  Please contact on call fellow weekdays after 5pm-7am and weekends: [phone]

## 2021-04-09 NOTE — PROGRESS NOTE ADULT - PROBLEM SELECTOR PLAN 1
Leukocytosis and tachypnea. Prior sputum cx's grew HIB and Pseudomonas. Alternates between inhaled tobra and azithromycin but pt having fevers, worsening dyspnea and cough w/ increased sputum production concerning for Pseudomonas pneumonia.  - Lactate initially 1.1,  Received Zosyn x2 in the ED.   - now on Avycaz and  inhaled tobra D#7  - ID consulted, f/u ID recs  - BCx x2 NGTD.    - c/w nebulized budesonide, PRN albuterol nebs, and inhaled hypertonic saline.   - c/w Chest PT, sputum with rare pseudomonas, Discussed with pulm attending on 4/9  - f/u pulm recs Leukocytosis and tachypnea. Prior sputum cx's grew HIB and Pseudomonas. Alternates between inhaled tobra and azithromycin but pt having fevers, worsening dyspnea and cough w/ increased sputum production concerning for Pseudomonas pneumonia.  - Lactate initially 1.1,  Received Zosyn x2 in the ED.   - now on Avycaz from 4/8- and  inhaled tobra D#7  - ID consulted, f/u ID recs  - BCx x2 NGTD.    - c/w nebulized budesonide, PRN albuterol nebs, and inhaled hypertonic saline.   - c/w Chest PT, sputum with rare pseudomonas, Discussed with pulm attending on 4/9, pt will need abx for 14 days  - f/u pulm recs  - Plan discussed with ACP

## 2021-04-09 NOTE — PROGRESS NOTE ADULT - SUBJECTIVE AND OBJECTIVE BOX
Chief Complaint:  Patient is a 66y old  Female who presents with a chief complaint of Pneumonia (08 Apr 2021 22:03)      Interval Events:   - Continues to have coughing + phlegm (improved since admission)     Allergies:  No Known Allergies        Hospital Medications:  acetaminophen   Tablet .. 650 milliGRAM(s) Oral every 6 hours PRN  ALBUTerol    90 MICROgram(s) HFA Inhaler 1 Puff(s) Inhalation every 6 hours  albuterol/ipratropium for Nebulization 3 milliLiter(s) Nebulizer every 8 hours  benzocaine 15 mG/menthol 3.6 mG (Sugar-Free) Lozenge 1 Lozenge Oral every 6 hours  benzonatate 100 milliGRAM(s) Oral three times a day  budesonide 160 MICROgram(s)/formoterol 4.5 MICROgram(s) Inhaler 2 Puff(s) Inhalation two times a day  calcium carbonate 1250 mG  + Vitamin D (OsCal 500 + D) 1 Tablet(s) Oral daily  ceftazidime/avibactam IVPB 2.5 Gram(s) IV Intermittent every 8 hours  cyanocobalamin Injectable 1000 MICROGram(s) IntraMuscular <User Schedule>  dextrose 40% Gel 15 Gram(s) Oral once  dextrose 5%. 1000 milliLiter(s) IV Continuous <Continuous>  dextrose 5%. 1000 milliLiter(s) IV Continuous <Continuous>  dextrose 50% Injectable 25 Gram(s) IV Push once  dextrose 50% Injectable 12.5 Gram(s) IV Push once  dextrose 50% Injectable 25 Gram(s) IV Push once  dextrose 50% Injectable 25 Gram(s) IV Push once  enoxaparin Injectable 40 milliGRAM(s) SubCutaneous daily  ferrous    sulfate 325 milliGRAM(s) Oral daily  FIRST- Mouthwash  BLM 10 milliLiter(s) Swish and Spit every 8 hours  fluticasone propionate 50 MICROgram(s)/spray Nasal Spray 1 Spray(s) Both Nostrils <User Schedule>  glucagon  Injectable 1 milliGRAM(s) IntraMuscular once  influenza   Vaccine 0.5 milliLiter(s) IntraMuscular once  insulin glargine Injectable (LANTUS) 22 Unit(s) SubCutaneous at bedtime  insulin lispro (ADMELOG) corrective regimen sliding scale   SubCutaneous three times a day before meals  insulin lispro (ADMELOG) corrective regimen sliding scale   SubCutaneous at bedtime  insulin lispro Injectable (ADMELOG) 5 Unit(s) SubCutaneous three times a day before meals  LORazepam     Tablet 1 milliGRAM(s) Oral once  montelukast 10 milliGRAM(s) Oral daily  nystatin    Suspension 311988 Unit(s) Swish and Swallow four times a day  pantoprazole    Tablet 40 milliGRAM(s) Oral before breakfast  polyethylene glycol 3350 17 Gram(s) Oral daily  predniSONE   Tablet 20 milliGRAM(s) Oral daily  senna 2 Tablet(s) Oral at bedtime  sodium chloride 7% Inhalation 4 milliLiter(s) Inhalation every 8 hours  tiotropium 18 MICROgram(s) Capsule 1 Capsule(s) Inhalation daily  tobramycin for Nebulization 300 milliGRAM(s) Inhalation every 12 hours  venlafaxine XR. 37.5 milliGRAM(s) Oral daily      PMHX/PSHX:  DM (diabetes mellitus)    Bronchitis    ABPA (allergic bronchopulmonary aspergillosis)    Bronchiectasis    Asthma    Hypertension    Vitamin D deficiency    Microcytic anemia    GERD (gastroesophageal reflux disease)    Postnasal drip    Pseudomonas aeruginosa colonization    Allergic rhinitis    Recurrent pneumonia    Type 2 diabetes mellitus, with long-term current use of insulin    History of cholecystectomy        Family history:  No pertinent family history in first degree relatives    Family history of diabetes mellitus    Family history of allergic rhinitis (Child, Child)    Family history of bronchitis        ROS:     General:  No wt loss, fevers, chills, night sweats, fatigue,   Eyes:  Good vision, no reported pain  ENT:  No sore throat, pain, runny nose, dysphagia  CV:  No pain, palpitations, hypo/hypertension  Pulm:  No dyspnea, cough, tachypnea, wheezing  GI: see above  :  No pain, bleeding, incontinence, nocturia  Muscle:  No pain, weakness  Neuro:  No weakness, tingling, memory problems  Psych:  No fatigue, insomnia, mood problems, depression  Endocrine:  No polyuria, polydipsia, cold/heat intolerance  Heme:  No petechiae, ecchymosis, easy bruisability  Skin:  No rash, tattoos, scars, edema      PHYSICAL EXAM:   Vital Signs:  Vital Signs Last 24 Hrs  T(C): 36.4 (09 Apr 2021 10:00), Max: 37 (09 Apr 2021 01:15)  T(F): 97.6 (09 Apr 2021 10:00), Max: 98.6 (09 Apr 2021 01:15)  HR: 83 (09 Apr 2021 10:00) (72 - 95)  BP: 122/67 (09 Apr 2021 10:00) (122/67 - 144/80)  BP(mean): 70 (08 Apr 2021 22:01) (70 - 70)  RR: 18 (09 Apr 2021 10:00) (17 - 18)  SpO2: 97% (09 Apr 2021 10:00) (95% - 100%)  Daily     Daily     GENERAL:  No acute distress  HEENT:  Normocephalic/atraumtic,  no scleral icterus  CHEST:  Clear to auscultation bilaterally, no wheezes/rales/ronchi, no accessory muscle use  HEART:  Regular rate and rhythm, no murmurs/rubs/gallops  ABDOMEN:  Soft, non-tender, non-distended, normoactive bowel sounds, no masses, no hepato-splenomegaly, no signs of chronic liver disease  EXTREMITIES:  No cyanosis, clubbing, or edema  SKIN:  No rash/erythema/ecchymoses/petechiae/wounds/abscess/warm/dry  NEURO:  Alert and oriented x 3, no asterixis, no tremor    LABS:                        11.9   9.36  )-----------( 194      ( 08 Apr 2021 07:36 )             37.9       04-09    136  |  100  |  16  ----------------------------<  84  3.8   |  29  |  0.49<L>    Ca    9.0      09 Apr 2021 08:08  Phos  3.1     04-08  Mg     2.1     04-08    TPro  6.5  /  Alb  3.0<L>  /  TBili  0.2  /  DBili  x   /  AST  62<H>  /  ALT  116<H>  /  AlkPhos  201<H>  04-09    LIVER FUNCTIONS - ( 09 Apr 2021 08:08 )  Alb: 3.0 g/dL / Pro: 6.5 g/dL / ALK PHOS: 201 U/L / ALT: 116 U/L / AST: 62 U/L / GGT: x           PT/INR - ( 09 Apr 2021 08:08 )   PT: 11.1 sec;   INR: 0.97 ratio         PTT - ( 08 Apr 2021 07:36 )  PTT:22.6 sec                            11.9   9.36  )-----------( 194      ( 08 Apr 2021 07:36 )             37.9                         12.1   9.32  )-----------( 201      ( 07 Apr 2021 06:58 )             38.8       Imaging:

## 2021-04-09 NOTE — SWALLOW VFSS/MBS ASSESSMENT ADULT - ROSENBEK'S PENETRATION ASPIRATION SCALE
(1) no aspiration, contrast does not enter airway 2= large cup sip 1= small cup sip/(2) contrast enters airway, remains above the vocal cords, no residue remains (penetration)

## 2021-04-09 NOTE — SWALLOW VFSS/MBS ASSESSMENT ADULT - ORAL PREP COMMENTS
Oral holding of bolus, reduced bolus formation with piecemeal deglutition of 2 per bolus Oral holding of bolus Sufficient mastication, reduced bolus formation marked by piecemeal deglutition of 2 per bolus Reduced bolus formation

## 2021-04-10 LAB
ALBUMIN SERPL ELPH-MCNC: 3.2 G/DL — LOW (ref 3.3–5)
ALP SERPL-CCNC: 200 U/L — HIGH (ref 40–120)
ALT FLD-CCNC: 99 U/L — HIGH (ref 4–33)
ANION GAP SERPL CALC-SCNC: 8 MMOL/L — SIGNIFICANT CHANGE UP (ref 7–14)
AST SERPL-CCNC: 45 U/L — HIGH (ref 4–32)
BILIRUB DIRECT SERPL-MCNC: <0.2 MG/DL — SIGNIFICANT CHANGE UP (ref 0–0.2)
BILIRUB INDIRECT FLD-MCNC: >0 MG/DL — SIGNIFICANT CHANGE UP (ref 0–1)
BILIRUB SERPL-MCNC: 0.2 MG/DL — SIGNIFICANT CHANGE UP (ref 0.2–1.2)
BUN SERPL-MCNC: 14 MG/DL — SIGNIFICANT CHANGE UP (ref 7–23)
CALCIUM SERPL-MCNC: 9.3 MG/DL — SIGNIFICANT CHANGE UP (ref 8.4–10.5)
CHLORIDE SERPL-SCNC: 97 MMOL/L — LOW (ref 98–107)
CO2 SERPL-SCNC: 30 MMOL/L — SIGNIFICANT CHANGE UP (ref 22–31)
CREAT SERPL-MCNC: 0.47 MG/DL — LOW (ref 0.5–1.3)
GLUCOSE BLDC GLUCOMTR-MCNC: 160 MG/DL — HIGH (ref 70–99)
GLUCOSE BLDC GLUCOMTR-MCNC: 202 MG/DL — HIGH (ref 70–99)
GLUCOSE BLDC GLUCOMTR-MCNC: 244 MG/DL — HIGH (ref 70–99)
GLUCOSE BLDC GLUCOMTR-MCNC: 347 MG/DL — HIGH (ref 70–99)
GLUCOSE BLDC GLUCOMTR-MCNC: 82 MG/DL — SIGNIFICANT CHANGE UP (ref 70–99)
GLUCOSE SERPL-MCNC: 84 MG/DL — SIGNIFICANT CHANGE UP (ref 70–99)
HCT VFR BLD CALC: 37.9 % — SIGNIFICANT CHANGE UP (ref 34.5–45)
HGB BLD-MCNC: 12.2 G/DL — SIGNIFICANT CHANGE UP (ref 11.5–15.5)
MCHC RBC-ENTMCNC: 27.8 PG — SIGNIFICANT CHANGE UP (ref 27–34)
MCHC RBC-ENTMCNC: 32.2 GM/DL — SIGNIFICANT CHANGE UP (ref 32–36)
MCV RBC AUTO: 86.3 FL — SIGNIFICANT CHANGE UP (ref 80–100)
NRBC # BLD: 0 /100 WBCS — SIGNIFICANT CHANGE UP
NRBC # FLD: 0 K/UL — SIGNIFICANT CHANGE UP
PLATELET # BLD AUTO: 225 K/UL — SIGNIFICANT CHANGE UP (ref 150–400)
POTASSIUM SERPL-MCNC: 3.9 MMOL/L — SIGNIFICANT CHANGE UP (ref 3.5–5.3)
POTASSIUM SERPL-SCNC: 3.9 MMOL/L — SIGNIFICANT CHANGE UP (ref 3.5–5.3)
PROT SERPL-MCNC: 6.8 G/DL — SIGNIFICANT CHANGE UP (ref 6–8.3)
RBC # BLD: 4.39 M/UL — SIGNIFICANT CHANGE UP (ref 3.8–5.2)
RBC # FLD: 13.7 % — SIGNIFICANT CHANGE UP (ref 10.3–14.5)
SODIUM SERPL-SCNC: 135 MMOL/L — SIGNIFICANT CHANGE UP (ref 135–145)
WBC # BLD: 10.37 K/UL — SIGNIFICANT CHANGE UP (ref 3.8–10.5)
WBC # FLD AUTO: 10.37 K/UL — SIGNIFICANT CHANGE UP (ref 3.8–10.5)

## 2021-04-10 PROCEDURE — 74183 MRI ABD W/O CNTR FLWD CNTR: CPT | Mod: 26

## 2021-04-10 PROCEDURE — 99233 SBSQ HOSP IP/OBS HIGH 50: CPT

## 2021-04-10 RX ADMIN — DIPHENHYDRAMINE HYDROCHLORIDE AND LIDOCAINE HYDROCHLORIDE AND ALUMINUM HYDROXIDE AND MAGNESIUM HYDRO 10 MILLILITER(S): KIT at 23:10

## 2021-04-10 RX ADMIN — DIPHENHYDRAMINE HYDROCHLORIDE AND LIDOCAINE HYDROCHLORIDE AND ALUMINUM HYDROXIDE AND MAGNESIUM HYDRO 10 MILLILITER(S): KIT at 05:38

## 2021-04-10 RX ADMIN — BENZOCAINE AND MENTHOL 1 LOZENGE: 5; 1 LIQUID ORAL at 23:10

## 2021-04-10 RX ADMIN — Medication 500000 UNIT(S): at 05:38

## 2021-04-10 RX ADMIN — Medication 500000 UNIT(S): at 23:10

## 2021-04-10 RX ADMIN — Medication 100 MILLIGRAM(S): at 05:37

## 2021-04-10 RX ADMIN — INSULIN GLARGINE 22 UNIT(S): 100 INJECTION, SOLUTION SUBCUTANEOUS at 23:59

## 2021-04-10 RX ADMIN — Medication 1 MILLIGRAM(S): at 09:54

## 2021-04-10 RX ADMIN — DIPHENHYDRAMINE HYDROCHLORIDE AND LIDOCAINE HYDROCHLORIDE AND ALUMINUM HYDROXIDE AND MAGNESIUM HYDRO 10 MILLILITER(S): KIT at 13:29

## 2021-04-10 RX ADMIN — Medication 37.5 MILLIGRAM(S): at 13:29

## 2021-04-10 RX ADMIN — ENOXAPARIN SODIUM 40 MILLIGRAM(S): 100 INJECTION SUBCUTANEOUS at 13:28

## 2021-04-10 RX ADMIN — Medication 4: at 18:03

## 2021-04-10 RX ADMIN — POLYETHYLENE GLYCOL 3350 17 GRAM(S): 17 POWDER, FOR SOLUTION ORAL at 13:30

## 2021-04-10 RX ADMIN — Medication 1 TABLET(S): at 13:29

## 2021-04-10 RX ADMIN — SODIUM CHLORIDE 4 MILLILITER(S): 9 INJECTION INTRAMUSCULAR; INTRAVENOUS; SUBCUTANEOUS at 15:54

## 2021-04-10 RX ADMIN — Medication 325 MILLIGRAM(S): at 13:29

## 2021-04-10 RX ADMIN — Medication 100 MILLIGRAM(S): at 13:29

## 2021-04-10 RX ADMIN — Medication 2: at 09:01

## 2021-04-10 RX ADMIN — BENZOCAINE AND MENTHOL 1 LOZENGE: 5; 1 LIQUID ORAL at 05:37

## 2021-04-10 RX ADMIN — Medication 2: at 13:09

## 2021-04-10 RX ADMIN — BUDESONIDE AND FORMOTEROL FUMARATE DIHYDRATE 2 PUFF(S): 160; 4.5 AEROSOL RESPIRATORY (INHALATION) at 09:02

## 2021-04-10 RX ADMIN — BENZOCAINE AND MENTHOL 1 LOZENGE: 5; 1 LIQUID ORAL at 18:08

## 2021-04-10 RX ADMIN — Medication 300 MILLIGRAM(S): at 22:09

## 2021-04-10 RX ADMIN — Medication 5 UNIT(S): at 13:08

## 2021-04-10 RX ADMIN — Medication 3 MILLILITER(S): at 22:09

## 2021-04-10 RX ADMIN — PANTOPRAZOLE SODIUM 40 MILLIGRAM(S): 20 TABLET, DELAYED RELEASE ORAL at 05:39

## 2021-04-10 RX ADMIN — Medication 1 SPRAY(S): at 13:29

## 2021-04-10 RX ADMIN — Medication 5 UNIT(S): at 18:02

## 2021-04-10 RX ADMIN — Medication 300 MILLIGRAM(S): at 15:55

## 2021-04-10 RX ADMIN — Medication 100 MILLIGRAM(S): at 23:10

## 2021-04-10 RX ADMIN — BENZOCAINE AND MENTHOL 1 LOZENGE: 5; 1 LIQUID ORAL at 13:29

## 2021-04-10 RX ADMIN — Medication 500000 UNIT(S): at 09:03

## 2021-04-10 RX ADMIN — Medication 500000 UNIT(S): at 18:02

## 2021-04-10 RX ADMIN — SENNA PLUS 2 TABLET(S): 8.6 TABLET ORAL at 23:10

## 2021-04-10 RX ADMIN — BUDESONIDE AND FORMOTEROL FUMARATE DIHYDRATE 2 PUFF(S): 160; 4.5 AEROSOL RESPIRATORY (INHALATION) at 23:10

## 2021-04-10 RX ADMIN — SODIUM CHLORIDE 4 MILLILITER(S): 9 INJECTION INTRAMUSCULAR; INTRAVENOUS; SUBCUTANEOUS at 22:10

## 2021-04-10 RX ADMIN — Medication 3 MILLILITER(S): at 15:52

## 2021-04-10 RX ADMIN — MONTELUKAST 10 MILLIGRAM(S): 4 TABLET, CHEWABLE ORAL at 13:28

## 2021-04-10 RX ADMIN — Medication 5 UNIT(S): at 09:01

## 2021-04-10 RX ADMIN — Medication 20 MILLIGRAM(S): at 05:37

## 2021-04-10 NOTE — PROGRESS NOTE ADULT - PROBLEM SELECTOR PLAN 1
Leukocytosis and tachypnea. Prior sputum cx's grew HIB and Pseudomonas. Alternates between inhaled tobra and azithromycin but pt having fevers, worsening dyspnea and cough w/ increased sputum production concerning for Pseudomonas pneumonia.  - Lactate initially 1.1,  Received Zosyn x2 in the ED.   - now on Avycaz from 4/8- and  inhaled tobra D#7  - f/u ID recs  - BCx x2 NGTD.    - c/w nebulized budesonide, PRN albuterol nebs, and inhaled hypertonic saline.   - c/w Chest PT, sputum with rare pseudomonas, Discussed with pulm attending on 4/9, pt will need abx for 14 days  - f/u pulm recs

## 2021-04-10 NOTE — PROGRESS NOTE ADULT - SUBJECTIVE AND OBJECTIVE BOX
Patient is a 66y old  Female who presents with a chief complaint of Pneumonia (09 Apr 2021 15:08)      SUBJECTIVE / OVERNIGHT EVENTS:    No events overnight. This AM, patient without n/v/d/cp/sob.      MEDICATIONS  (STANDING):  ALBUTerol    90 MICROgram(s) HFA Inhaler 1 Puff(s) Inhalation every 6 hours  albuterol/ipratropium for Nebulization 3 milliLiter(s) Nebulizer every 8 hours  benzocaine 15 mG/menthol 3.6 mG (Sugar-Free) Lozenge 1 Lozenge Oral every 6 hours  benzonatate 100 milliGRAM(s) Oral three times a day  budesonide 160 MICROgram(s)/formoterol 4.5 MICROgram(s) Inhaler 2 Puff(s) Inhalation two times a day  calcium carbonate 1250 mG  + Vitamin D (OsCal 500 + D) 1 Tablet(s) Oral daily  ceftazidime/avibactam IVPB 2.5 Gram(s) IV Intermittent every 8 hours  cyanocobalamin Injectable 1000 MICROGram(s) IntraMuscular <User Schedule>  dextrose 40% Gel 15 Gram(s) Oral once  dextrose 5%. 1000 milliLiter(s) (50 mL/Hr) IV Continuous <Continuous>  dextrose 5%. 1000 milliLiter(s) (100 mL/Hr) IV Continuous <Continuous>  dextrose 50% Injectable 25 Gram(s) IV Push once  dextrose 50% Injectable 12.5 Gram(s) IV Push once  dextrose 50% Injectable 25 Gram(s) IV Push once  dextrose 50% Injectable 25 Gram(s) IV Push once  enoxaparin Injectable 40 milliGRAM(s) SubCutaneous daily  ferrous    sulfate 325 milliGRAM(s) Oral daily  FIRST- Mouthwash  BLM 10 milliLiter(s) Swish and Spit every 8 hours  fluticasone propionate 50 MICROgram(s)/spray Nasal Spray 1 Spray(s) Both Nostrils <User Schedule>  glucagon  Injectable 1 milliGRAM(s) IntraMuscular once  influenza   Vaccine 0.5 milliLiter(s) IntraMuscular once  insulin glargine Injectable (LANTUS) 22 Unit(s) SubCutaneous at bedtime  insulin lispro (ADMELOG) corrective regimen sliding scale   SubCutaneous three times a day before meals  insulin lispro (ADMELOG) corrective regimen sliding scale   SubCutaneous at bedtime  insulin lispro Injectable (ADMELOG) 5 Unit(s) SubCutaneous three times a day before meals  montelukast 10 milliGRAM(s) Oral daily  nystatin    Suspension 702980 Unit(s) Swish and Swallow four times a day  pantoprazole    Tablet 40 milliGRAM(s) Oral before breakfast  polyethylene glycol 3350 17 Gram(s) Oral daily  predniSONE   Tablet 20 milliGRAM(s) Oral daily  senna 2 Tablet(s) Oral at bedtime  sodium chloride 7% Inhalation 4 milliLiter(s) Inhalation every 8 hours  tiotropium 18 MICROgram(s) Capsule 1 Capsule(s) Inhalation daily  tobramycin for Nebulization 300 milliGRAM(s) Inhalation every 12 hours  venlafaxine XR. 37.5 milliGRAM(s) Oral daily    MEDICATIONS  (PRN):  acetaminophen   Tablet .. 650 milliGRAM(s) Oral every 6 hours PRN Mild Pain (1 - 3)      PHYSICAL EXAM:  T(C): 36.4 (04-10-21 @ 09:32), Max: 36.7 (04-09-21 @ 18:00)  HR: 84 (04-10-21 @ 09:32) (74 - 88)  BP: 137/72 (04-10-21 @ 09:32) (123/72 - 144/74)  RR: 19 (04-10-21 @ 09:32) (17 - 19)  SpO2: 97% (04-10-21 @ 09:32) (95% - 100%)  I&O's Summary    GENERAL: NAD, well-developed  HEAD:  Atraumatic, Normocephalic, MMM  CHEST/LUNG: No use of accessory muscles, CTAB, breathing non-labored  COR: RR, no mrcg  ABD: Soft, ND/NT, +BS  PSYCH: AAOx3  NEUROLOGY: CN II-XII grossly intact, moving all extremities  SKIN: No rashes or lesions    LABS:  CAPILLARY BLOOD GLUCOSE      POCT Blood Glucose.: 244 mg/dL (10 Apr 2021 08:54)  POCT Blood Glucose.: 123 mg/dL (09 Apr 2021 21:51)  POCT Blood Glucose.: 139 mg/dL (09 Apr 2021 17:44)  POCT Blood Glucose.: 214 mg/dL (09 Apr 2021 14:20)                          12.2   10.37 )-----------( 225      ( 10 Apr 2021 07:00 )             37.9     04-10    135  |  97<L>  |  14  ----------------------------<  84  3.9   |  30  |  0.47<L>    Ca    9.3      10 Apr 2021 07:00    TPro  6.8  /  Alb  3.2<L>  /  TBili  0.2  /  DBili  <0.2  /  AST  45<H>  /  ALT  99<H>  /  AlkPhos  200<H>  04-10    PT/INR - ( 09 Apr 2021 08:08 )   PT: 11.1 sec;   INR: 0.97 ratio                     RADIOLOGY & ADDITIONAL TESTS:    Telemetry Personally Reviewed -     Imaging Personally Reviewed -     Imaging Reviewed -     Consultant(s) Notes Reviewed -       Care Discussed with Consultants/Other Providers -

## 2021-04-11 ENCOUNTER — TRANSCRIPTION ENCOUNTER (OUTPATIENT)
Age: 66
End: 2021-04-11

## 2021-04-11 LAB
GLUCOSE BLDC GLUCOMTR-MCNC: 145 MG/DL — HIGH (ref 70–99)
GLUCOSE BLDC GLUCOMTR-MCNC: 152 MG/DL — HIGH (ref 70–99)
GLUCOSE BLDC GLUCOMTR-MCNC: 203 MG/DL — HIGH (ref 70–99)
GLUCOSE BLDC GLUCOMTR-MCNC: 358 MG/DL — HIGH (ref 70–99)

## 2021-04-11 PROCEDURE — 99233 SBSQ HOSP IP/OBS HIGH 50: CPT

## 2021-04-11 RX ADMIN — Medication 100 MILLIGRAM(S): at 23:20

## 2021-04-11 RX ADMIN — Medication 300 MILLIGRAM(S): at 10:43

## 2021-04-11 RX ADMIN — Medication 2: at 18:06

## 2021-04-11 RX ADMIN — Medication 5 UNIT(S): at 08:56

## 2021-04-11 RX ADMIN — DIPHENHYDRAMINE HYDROCHLORIDE AND LIDOCAINE HYDROCHLORIDE AND ALUMINUM HYDROXIDE AND MAGNESIUM HYDRO 10 MILLILITER(S): KIT at 13:06

## 2021-04-11 RX ADMIN — Medication 100 MILLIGRAM(S): at 13:04

## 2021-04-11 RX ADMIN — Medication 500000 UNIT(S): at 18:11

## 2021-04-11 RX ADMIN — Medication 1 TABLET(S): at 13:04

## 2021-04-11 RX ADMIN — BUDESONIDE AND FORMOTEROL FUMARATE DIHYDRATE 2 PUFF(S): 160; 4.5 AEROSOL RESPIRATORY (INHALATION) at 23:20

## 2021-04-11 RX ADMIN — DIPHENHYDRAMINE HYDROCHLORIDE AND LIDOCAINE HYDROCHLORIDE AND ALUMINUM HYDROXIDE AND MAGNESIUM HYDRO 10 MILLILITER(S): KIT at 05:58

## 2021-04-11 RX ADMIN — MONTELUKAST 10 MILLIGRAM(S): 4 TABLET, CHEWABLE ORAL at 13:04

## 2021-04-11 RX ADMIN — Medication 5: at 12:36

## 2021-04-11 RX ADMIN — DIPHENHYDRAMINE HYDROCHLORIDE AND LIDOCAINE HYDROCHLORIDE AND ALUMINUM HYDROXIDE AND MAGNESIUM HYDRO 10 MILLILITER(S): KIT at 23:20

## 2021-04-11 RX ADMIN — BUDESONIDE AND FORMOTEROL FUMARATE DIHYDRATE 2 PUFF(S): 160; 4.5 AEROSOL RESPIRATORY (INHALATION) at 08:57

## 2021-04-11 RX ADMIN — POLYETHYLENE GLYCOL 3350 17 GRAM(S): 17 POWDER, FOR SOLUTION ORAL at 13:03

## 2021-04-11 RX ADMIN — SODIUM CHLORIDE 4 MILLILITER(S): 9 INJECTION INTRAMUSCULAR; INTRAVENOUS; SUBCUTANEOUS at 16:05

## 2021-04-11 RX ADMIN — Medication 37.5 MILLIGRAM(S): at 13:05

## 2021-04-11 RX ADMIN — ENOXAPARIN SODIUM 40 MILLIGRAM(S): 100 INJECTION SUBCUTANEOUS at 13:03

## 2021-04-11 RX ADMIN — Medication 5 UNIT(S): at 18:06

## 2021-04-11 RX ADMIN — BENZOCAINE AND MENTHOL 1 LOZENGE: 5; 1 LIQUID ORAL at 13:06

## 2021-04-11 RX ADMIN — Medication 325 MILLIGRAM(S): at 13:04

## 2021-04-11 RX ADMIN — Medication 500000 UNIT(S): at 05:58

## 2021-04-11 RX ADMIN — Medication 3 MILLILITER(S): at 16:05

## 2021-04-11 RX ADMIN — BENZOCAINE AND MENTHOL 1 LOZENGE: 5; 1 LIQUID ORAL at 23:42

## 2021-04-11 RX ADMIN — Medication 500000 UNIT(S): at 10:31

## 2021-04-11 RX ADMIN — SODIUM CHLORIDE 4 MILLILITER(S): 9 INJECTION INTRAMUSCULAR; INTRAVENOUS; SUBCUTANEOUS at 22:40

## 2021-04-11 RX ADMIN — Medication 1 SPRAY(S): at 13:06

## 2021-04-11 RX ADMIN — Medication 300 MILLIGRAM(S): at 22:02

## 2021-04-11 RX ADMIN — Medication 5 UNIT(S): at 12:36

## 2021-04-11 RX ADMIN — BENZOCAINE AND MENTHOL 1 LOZENGE: 5; 1 LIQUID ORAL at 05:58

## 2021-04-11 RX ADMIN — Medication 500000 UNIT(S): at 23:20

## 2021-04-11 RX ADMIN — Medication 3 MILLILITER(S): at 10:42

## 2021-04-11 RX ADMIN — INSULIN GLARGINE 22 UNIT(S): 100 INJECTION, SOLUTION SUBCUTANEOUS at 23:19

## 2021-04-11 RX ADMIN — SENNA PLUS 2 TABLET(S): 8.6 TABLET ORAL at 23:21

## 2021-04-11 RX ADMIN — SODIUM CHLORIDE 4 MILLILITER(S): 9 INJECTION INTRAMUSCULAR; INTRAVENOUS; SUBCUTANEOUS at 10:43

## 2021-04-11 RX ADMIN — Medication 3 MILLILITER(S): at 22:30

## 2021-04-11 RX ADMIN — Medication 100 MILLIGRAM(S): at 05:58

## 2021-04-11 RX ADMIN — Medication 20 MILLIGRAM(S): at 06:00

## 2021-04-11 NOTE — PROGRESS NOTE ADULT - PROBLEM SELECTOR PLAN 4
will trend lfts, hep panel with Hep C reactive, hiv neg, waiting for further pending labs, US showed cirrhotic liver,   Appreciate hepatology consult, wkup per hepatology, labs pending  Ca 19-9 mildly elevated at 42, f/u with hepatology  ceruloplasmin normal, alpha fetoprotein normal  - mrcp w/ e/o cirrhosis, motion limited exam  - daughter Inderdeep updated on 4/9

## 2021-04-11 NOTE — DISCHARGE NOTE PROVIDER - PROVIDER TOKENS
PROVIDER:[TOKEN:[[id number]],FOLLOWUP:[1 month]],PROVIDER:[TOKEN:[[id number]],FOLLOWUP:[2 weeks],ESTABLISHEDPATIENT:[T]],PROVIDER:[TOKEN:[[id number]],FOLLOWUP:[2 weeks],ESTABLISHEDPATIENT:[T]]

## 2021-04-11 NOTE — DISCHARGE NOTE PROVIDER - NSDCMRMEDTOKEN_GEN_ALL_CORE_FT
Admelog SoloStar 100 units/mL injectable solution: 2 unit(s) injectable 3 times a day (before meals)  albuterol 2.5 mg/3 mL (0.083%) inhalation solution: 3 milliliter(s) inhaled every 6 hours, As Needed  albuterol 90 mcg/inh inhalation aerosol: 1 to 2 puff(s) inhaled every 4 to 6 hours, As Needed  amLODIPine 5 mg oral tablet: 1 tab(s) orally once a day  Basaglar KwikPen 100 units/mL subcutaneous solution: 10 unit(s) subcutaneous once a day (at bedtime)  benzonatate 100 mg oral capsule: 1 cap(s) orally 3 times a day  bisacodyl 5 mg oral delayed release tablet: 1 tab(s) orally 2 times a day, As Needed  budesonide 0.5 mg/2 mL inhalation suspension: 2 milliliter(s) inhaled every 12 hours  Calcium 600+D oral tablet: 1 tab(s) orally 2 times a day  cyanocobalamin 1000 mcg/mL injectable solution: 1000 microgram(s) injectable every 7 days on Wednesday  ferrous gluconate 324 mg (37.5 mg elemental iron) oral tablet: 1 tab(s) orally 2 times a day  Flonase 50 mcg/inh nasal spray: 1 spray(s) in each nostril once a day  levoFLOXacin 500 mg oral tablet: 1 tab(s) orally every 24 hoursx7 days  metFORMIN 1000 mg oral tablet: 1 tab(s) orally 2 times a day  montelukast 10 mg oral tablet: 1 tab(s) orally once a day  NebuSal: 4 milliliter(s) inhaled 2 times a day  omeprazole 20 mg oral delayed release capsule: 1 cap(s) orally once a day  Perforomist 20 mcg/2 mL inhalation solution: 2 milliliter(s) inhaled every 12 hours  predniSONE 10 mg oral tablet: 1 tab(s) orally 2 times a day  Senna 8.6 mg oral tablet: 1 tab(s) orally 2 times a day  tobramycin 75 mg/mL inhalation solution: 4 milliliter(s) inhaled 2 times a day  venlafaxine 37.5 mg oral capsule, extended release: 1 cap(s) orally once a day  Vitamin C 500 mg oral tablet: 1 tab(s) orally once a day  Vitamin D2 50,000 intl units (1.25 mg) oral capsule: 1 cap(s) orally once a week   Admelog SoloStar 100 units/mL injectable solution: 5 unit(s) injectable before breakfast, 3 units before lunch and 3 units before dinner.   amLODIPine 5 mg oral tablet: 1 tab(s) orally once a day  Basaglar KwikPen 100 units/mL subcutaneous solution: 14 unit(s) subcutaneous once a day (at bedtime)   benzonatate 100 mg oral capsule: 1 cap(s) orally 3 times a day  bisacodyl 5 mg oral delayed release tablet: 1 tab(s) orally 2 times a day, As Needed  budesonide-formoterol 160 mcg-4.5 mcg/inh inhalation aerosol: 2 puff(s) inhaled 2 times a day   Calcium 600+D oral tablet: 1 tab(s) orally 2 times a day  cyanocobalamin 1000 mcg/mL injectable solution: 1000 microgram(s) injectable every 7 days on Wednesday  ferrous gluconate 324 mg (37.5 mg elemental iron) oral tablet: 1 tab(s) orally 2 times a day  Flonase 50 mcg/inh nasal spray: 1 spray(s) in each nostril once a day  metFORMIN 1000 mg oral tablet: 1 tab(s) orally 2 times a day  montelukast 10 mg oral tablet: 1 tab(s) orally once a day  NebuSal: 4 milliliter(s) inhaled 2 times a day  omeprazole 20 mg oral delayed release capsule: 1 cap(s) orally once a day  predniSONE 10 mg oral tablet: 1 tab(s) orally once a day  Senna 8.6 mg oral tablet: 1 tab(s) orally 2 times a day  tiotropium 18 mcg inhalation capsule: 1 cap(s) inhaled once a day  tobramycin 75 mg/mL inhalation solution: 4 milliliter(s) inhaled 2 times a day  venlafaxine 37.5 mg oral capsule, extended release: 1 cap(s) orally once a day  Vitamin C 500 mg oral tablet: 1 tab(s) orally once a day

## 2021-04-11 NOTE — DISCHARGE NOTE PROVIDER - CARE PROVIDER_API CALL
Dylan Tate)  Gastroenterology; Internal Medicine; Transplant Hepatology  [address] [place] [address]  Phone: [phone]  Fax: [phone]  Follow Up Time: 1 month    STEVE ALFONSO  Internal Medicine  [address] [place] [address]  Phone: [phone]  Fax: [phone]  Established Patient  Follow Up Time: 2 weeks    Shelli Young)  Critical Care Medicine; Pulmonary Disease  [address] [place] [address]  Phone: [phone]  Fax: [phone]  Established Patient  Follow Up Time: 2 weeks

## 2021-04-11 NOTE — PROGRESS NOTE ADULT - SUBJECTIVE AND OBJECTIVE BOX
Patient is a 66y old  Female who presents with a chief complaint of Pneumonia (11 Apr 2021 10:39)      SUBJECTIVE / OVERNIGHT EVENTS:    No events overnight. This AM, patient without n/v/d/cp/sob.      MEDICATIONS  (STANDING):  ALBUTerol    90 MICROgram(s) HFA Inhaler 1 Puff(s) Inhalation every 6 hours  albuterol/ipratropium for Nebulization 3 milliLiter(s) Nebulizer every 8 hours  benzocaine 15 mG/menthol 3.6 mG (Sugar-Free) Lozenge 1 Lozenge Oral every 6 hours  benzonatate 100 milliGRAM(s) Oral three times a day  budesonide 160 MICROgram(s)/formoterol 4.5 MICROgram(s) Inhaler 2 Puff(s) Inhalation two times a day  calcium carbonate 1250 mG  + Vitamin D (OsCal 500 + D) 1 Tablet(s) Oral daily  ceftazidime/avibactam IVPB 2.5 Gram(s) IV Intermittent every 8 hours  cyanocobalamin Injectable 1000 MICROGram(s) IntraMuscular <User Schedule>  dextrose 40% Gel 15 Gram(s) Oral once  dextrose 5%. 1000 milliLiter(s) (50 mL/Hr) IV Continuous <Continuous>  dextrose 5%. 1000 milliLiter(s) (100 mL/Hr) IV Continuous <Continuous>  dextrose 50% Injectable 25 Gram(s) IV Push once  dextrose 50% Injectable 12.5 Gram(s) IV Push once  dextrose 50% Injectable 25 Gram(s) IV Push once  dextrose 50% Injectable 25 Gram(s) IV Push once  enoxaparin Injectable 40 milliGRAM(s) SubCutaneous daily  ferrous    sulfate 325 milliGRAM(s) Oral daily  FIRST- Mouthwash  BLM 10 milliLiter(s) Swish and Spit every 8 hours  fluticasone propionate 50 MICROgram(s)/spray Nasal Spray 1 Spray(s) Both Nostrils <User Schedule>  glucagon  Injectable 1 milliGRAM(s) IntraMuscular once  influenza   Vaccine 0.5 milliLiter(s) IntraMuscular once  insulin glargine Injectable (LANTUS) 22 Unit(s) SubCutaneous at bedtime  insulin lispro (ADMELOG) corrective regimen sliding scale   SubCutaneous three times a day before meals  insulin lispro (ADMELOG) corrective regimen sliding scale   SubCutaneous at bedtime  insulin lispro Injectable (ADMELOG) 5 Unit(s) SubCutaneous three times a day before meals  montelukast 10 milliGRAM(s) Oral daily  nystatin    Suspension 361395 Unit(s) Swish and Swallow four times a day  pantoprazole    Tablet 40 milliGRAM(s) Oral before breakfast  polyethylene glycol 3350 17 Gram(s) Oral daily  predniSONE   Tablet 20 milliGRAM(s) Oral daily  senna 2 Tablet(s) Oral at bedtime  sodium chloride 7% Inhalation 4 milliLiter(s) Inhalation every 8 hours  tiotropium 18 MICROgram(s) Capsule 1 Capsule(s) Inhalation daily  tobramycin for Nebulization 300 milliGRAM(s) Inhalation every 12 hours  venlafaxine XR. 37.5 milliGRAM(s) Oral daily    MEDICATIONS  (PRN):  acetaminophen   Tablet .. 650 milliGRAM(s) Oral every 6 hours PRN Mild Pain (1 - 3)      PHYSICAL EXAM:  T(C): 36.7 (04-11-21 @ 05:49), Max: 36.9 (04-10-21 @ 17:30)  HR: 82 (04-11-21 @ 10:44) (82 - 95)  BP: 146/79 (04-11-21 @ 05:49) (126/65 - 146/79)  RR: 18 (04-11-21 @ 05:49) (18 - 19)  SpO2: 97% (04-11-21 @ 10:44) (95% - 100%)  I&O's Summary    GENERAL: NAD, well-developed  HEAD:  Atraumatic, Normocephalic, MMM  CHEST/LUNG: No use of accessory muscles, CTAB, breathing non-labored  COR: RR, no mrcg  ABD: Soft, ND/NT, +BS  PSYCH: AAOx3  NEUROLOGY: CN II-XII grossly intact, moving all extremities  SKIN: No rashes or lesions    LABS:  CAPILLARY BLOOD GLUCOSE      POCT Blood Glucose.: 145 mg/dL (11 Apr 2021 08:46)  POCT Blood Glucose.: 160 mg/dL (10 Apr 2021 23:32)  POCT Blood Glucose.: 82 mg/dL (10 Apr 2021 22:40)  POCT Blood Glucose.: 347 mg/dL (10 Apr 2021 17:21)  POCT Blood Glucose.: 202 mg/dL (10 Apr 2021 12:16)                          12.2   10.37 )-----------( 225      ( 10 Apr 2021 07:00 )             37.9     04-10    135  |  97<L>  |  14  ----------------------------<  84  3.9   |  30  |  0.47<L>    Ca    9.3      10 Apr 2021 07:00    TPro  6.8  /  Alb  3.2<L>  /  TBili  0.2  /  DBili  <0.2  /  AST  45<H>  /  ALT  99<H>  /  AlkPhos  200<H>  04-10                RADIOLOGY & ADDITIONAL TESTS:    Telemetry Personally Reviewed -     Imaging Personally Reviewed -     Imaging Reviewed - < from: MR MRCP w/wo IV Cont (04.10.21 @ 11:38) >  Limited, motion degraded examination.    Cirrhosis. No evidence of a focal hepatic lesion.    < end of copied text >      Consultant(s) Notes Reviewed -       Care Discussed with Consultants/Other Providers -

## 2021-04-11 NOTE — DISCHARGE NOTE PROVIDER - HOSPITAL COURSE
66F w/ diffuse cystitic bronchiectasis, primary ciliary dyskinesia, allergic bronchopulmonary aspergillosis, pseudomonas pneumonia, HTN, DM2 p/w SOB, fevers a/w sepsis 2/2 PNA likely 2/2 Pseudomonas resistant to fluoroquinolones. CXR/CT chest with L apical PNA. Pulmonology/ID consulted and patient started on prednisone for bronchiectasis w/ inhaled Tobramycin and Avycaz for 10 - 14 days for  pseudomonas PNA. BCx/UCx remain NTD. Of note, patient found to have (+) HepC Ab screen, HCV RNA ___ for which hepatology was consulted. Per hepatology, appears to be NEW diagnosis of cirrhosis with AbdUS showing nodular liver. MRCP revealing for cirrhosis without evidence of liver masses.     Planned to send home on Symbicort/Singulair/Spiriva & (ABx) ___ to complete on ___.    On ___ this case was reviewed with  ____, the patient is medically stable and optimized for discharge. All medications were reviewed and prescriptions were sent to mutually agreed upon pharmacy. 66F w/ diffuse cystitic bronchiectasis, primary ciliary dyskinesia, allergic bronchopulmonary aspergillosis, pseudomonas pneumonia, HTN, DM2 p/w SOB, fevers a/w sepsis 2/2 PNA likely 2/2 Pseudomonas resistant to fluoroquinolones. CXR/CT chest with L apical PNA. Pulmonology/ID consulted and patient started on prednisone for bronchiectasis w/ inhaled Tobramycin and Avycaz for 10 - 14 days for  pseudomonas PNA. BCx/UCx remain NTD. Of note, patient found to have (+) HepC Ab screen & HCV RNA negative for which hepatology was consulted. Per hepatology, appears to be NEW diagnosis of cirrhosis with AbdUS showing nodular liver & MRCP revealing for cirrhosis without evidence of liver masses. However, suspect HCV Ab (+) could be from prior exposure vs ?false positive.    Planned to send home on Symbicort/Singulair/Spiriva/Steroids & (ABx) ___ to complete on ___.    On ___ this case was reviewed with  ____, the patient is medically stable and optimized for discharge. All medications were reviewed and prescriptions were sent to mutually agreed upon pharmacy. 66F w/ diffuse cystitic bronchiectasis, primary ciliary dyskinesia, allergic bronchopulmonary aspergillosis, pseudomonas pneumonia, HTN, DM2 p/w SOB, fevers a/w sepsis 2/2 PNA likely 2/2 Pseudomonas resistant to fluoroquinolones. CXR/CT chest with L apical PNA. Pulmonology/ID consulted and patient started on prednisone for bronchiectasis w/ inhaled Tobramycin and Avycaz for 10 - 14 days for  pseudomonas PNA. BCx/UCx remain NTD. Of note, patient found to have (+) HepC Ab screen & HCV RNA negative for which hepatology was consulted. Per hepatology, appears to be NEW diagnosis of cirrhosis with AbdUS showing nodular liver & MRCP revealing for cirrhosis without evidence of liver masses. However, suspect HCV Ab (+) could be from prior exposure vs ?false positive.    Avycaz planned to be completed 4/22     On ___ this case was reviewed with  ____, the patient is medically stable and optimized for discharge. All medications were reviewed and prescriptions were sent to mutually agreed upon pharmacy. 66F w/ diffuse cystitic bronchiectasis, primary ciliary dyskinesia, allergic bronchopulmonary aspergillosis, pseudomonas pneumonia, HTN, DM2 p/w SOB, fevers a/w sepsis 2/2 PNA likely 2/2 Pseudomonas resistant to fluoroquinolones. CXR/CT chest with L apical PNA. Pulmonology/ID consulted and patient started on prednisone for bronchiectasis w/ inhaled Tobramycin and Avycaz for 7-10days for  pseudomonas PNA. BCx/UCx remain NTD. Of note, patient found to have (+) HepC Ab screen & HCV RNA negative for which hepatology was consulted. Per hepatology, appears to be NEW diagnosis of cirrhosis with AbdUS showing nodular liver & MRCP revealing for cirrhosis without evidence of liver masses. However, suspect HCV Ab (+) could be from prior exposure vs ?false positive.    Avycaz planned to be completed 4/28    On 4/18 this case was reviewed with Dr. Verduzco , the patient is medically stable and optimized for discharge. All medications were reviewed and prescriptions were sent to mutually agreed upon pharmacy.

## 2021-04-11 NOTE — DISCHARGE NOTE PROVIDER - CARE PROVIDERS DIRECT ADDRESSES
[email or web address]

## 2021-04-11 NOTE — DISCHARGE NOTE PROVIDER - NSDCCPCAREPLAN_GEN_ALL_CORE_FT
PRINCIPAL DISCHARGE DIAGNOSIS  Diagnosis: Pneumonia of left upper lobe due to infectious organism  Assessment and Plan of Treatment: A course of antibiotics was initiated during your hospital stay - please continue as prescribed. Monitor for worsening of disease, such as, increased cough/sputum, difficulty breathing, fever/chills, or changes in mental status. Follow-up with your primary care provider and pulmonoloigist as an outpatient for further medical care/recommendations.      SECONDARY DISCHARGE DIAGNOSES  Diagnosis: ABPA (allergic bronchopulmonary aspergillosis)  Assessment and Plan of Treatment: Please continue to use your inhalers as prescribed and follow-up with your primary care provider/pulmonologist for further care/recommendations. Monitor for signs/symptoms of exacerbation, such as, shortness of breath, increased sputum production, increased cough, wheezing, difficulty breathing, or fever - Report to the emergency room if symptoms are not relieved by usual regimen.    Diagnosis: Type 2 diabetes mellitus with hyperglycemia, with long-term current use of insulin  Assessment and Plan of Treatment: You HgbA1c was 8.5%.  Continue your medication regimen and a consistent carbohydrate diet (Meaning eating the same amount of carbohydrates at the same time each day). Monitor blood glucose levels throughout the day before meals and at bedtime. Record blood sugars and bring to outpatient providers appointment in order to be reviewed by your doctor for management modifications. If your sugars are more than 400 or less than 70 you should contact your PCP immediately. Monitor for signs/symptoms of low blood glucose, such as, dizziness, altered mental status, or cool/clammy skin. In addition, monitor for signs/symptoms of high blood glucose, such as, feeling hot, dry, fatigued, or with increased thirst/urination. Make regular podiatry appointments in order to have feet checked for wounds and uncontrolled toe nail growth to prevent infections, as well as, appointments with an ophthalmologist to monitor your vision.    Diagnosis: Shortness of breath  Assessment and Plan of Treatment: Likely secondary to pneumonia and underlying bronchiectasis. Continue your medications as prescribed.    Diagnosis: Positive hepatitis C antibody test  Assessment and Plan of Treatment: You were foudn to have a positive hepatitis C screening. You were seen by the liver doctors with no need for acute intervention at this time. Follow up with them in their office as an outpatient within 1 month for further monitoring and management recommendations.    Diagnosis: Liver cirrhosis  Assessment and Plan of Treatment: Your abdominal ultrasound and MRCP revealed evidence of chronic liver (cirrhosis). You were seen by the liver doctors with no need for acute intervention at this time. Follow up with them in their office as an outpatient within 1 month for further monitoring and management recommendations.     PRINCIPAL DISCHARGE DIAGNOSIS  Diagnosis: Pneumonia of left upper lobe due to infectious organism  Assessment and Plan of Treatment: A course of antibiotics was initiated during your hospital stay - please continue Avycaz as prescribed for a total of 14 days until 4/22. Monitor for worsening of disease, such as, increased cough/sputum, difficulty breathing, fever/chills, or changes in mental status. Follow-up with your primary care provider and pulmonoloigist as an outpatient for further medical care/recommendations.      SECONDARY DISCHARGE DIAGNOSES  Diagnosis: Shortness of breath  Assessment and Plan of Treatment: Likely secondary to pneumonia and underlying bronchiectasis. Continue your medications as prescribed.    Diagnosis: ABPA (allergic bronchopulmonary aspergillosis)  Assessment and Plan of Treatment: Please continue to use your inhalers as prescribed and follow-up with your primary care provider/pulmonologist for further care/recommendations. Monitor for signs/symptoms of exacerbation, such as, shortness of breath, increased sputum production, increased cough, wheezing, difficulty breathing, or fever - Report to the emergency room if symptoms are not relieved by usual regimen.    Diagnosis: Type 2 diabetes mellitus with hyperglycemia, with long-term current use of insulin  Assessment and Plan of Treatment: You HgbA1c was 8.5%.  Continue your medication regimen and a consistent carbohydrate diet (Meaning eating the same amount of carbohydrates at the same time each day). Monitor blood glucose levels throughout the day before meals and at bedtime. Record blood sugars and bring to outpatient providers appointment in order to be reviewed by your doctor for management modifications. If your sugars are more than 400 or less than 70 you should contact your PCP immediately. Monitor for signs/symptoms of low blood glucose, such as, dizziness, altered mental status, or cool/clammy skin. In addition, monitor for signs/symptoms of high blood glucose, such as, feeling hot, dry, fatigued, or with increased thirst/urination. Make regular podiatry appointments in order to have feet checked for wounds and uncontrolled toe nail growth to prevent infections, as well as, appointments with an ophthalmologist to monitor your vision.    Diagnosis: Positive hepatitis C antibody test  Assessment and Plan of Treatment: You were foudn to have a positive hepatitis C screening. You were seen by the liver doctors with no need for acute intervention at this time. Follow up with them in their office as an outpatient within 1 month for further monitoring and management recommendations.    Diagnosis: Liver cirrhosis  Assessment and Plan of Treatment: Your abdominal ultrasound and MRCP revealed evidence of chronic liver (cirrhosis). You were seen by the liver doctors with no need for acute intervention at this time. Follow up with them in their office as an outpatient within 1 month for further monitoring and management recommendations.     PRINCIPAL DISCHARGE DIAGNOSIS  Diagnosis: Pneumonia of left upper lobe due to infectious organism  Assessment and Plan of Treatment: A course of antibiotics was initiated during your hospital stay - please continue Avycaz as prescribed for a total of 7 days. Monitor for worsening of disease, such as, increased cough/sputum, difficulty breathing, fever/chills, or changes in mental status. Follow-up with your primary care provider and pulmonoloigist Dr. Young as an outpatient for further medical care/recommendations.      SECONDARY DISCHARGE DIAGNOSES  Diagnosis: Shortness of breath  Assessment and Plan of Treatment: Likely secondary to pneumonia and underlying bronchiectasis. Continue your medications as prescribed.    Diagnosis: ABPA (allergic bronchopulmonary aspergillosis)  Assessment and Plan of Treatment: Please continue to use your inhalers as prescribed and follow-up with your primary care provider/pulmonologist for further care/recommendations. Monitor for signs/symptoms of exacerbation, such as, shortness of breath, increased sputum production, increased cough, wheezing, difficulty breathing, or fever - Report to the emergency room if symptoms are not relieved by usual regimen.    Diagnosis: Type 2 diabetes mellitus with hyperglycemia, with long-term current use of insulin  Assessment and Plan of Treatment: You HgbA1c was 8.5%. Your insulin regimen was adjusted due to low blood sugar. Continue checking blood sugars and use insulin as directed. Your sugars were found to be low at bedtime/ at night, we recommend you having a bedtime snack before going to sleep to keep sugars within normal range.   Continue your medication regimen and a consistent carbohydrate diet (Meaning eating the same amount of carbohydrates at the same time each day). Monitor blood glucose levels throughout the day before meals and at bedtime. Record blood sugars and bring to outpatient providers appointment in order to be reviewed by your doctor for management modifications. If your sugars are more than 400 or less than 70 you should contact your PCP immediately. Monitor for signs/symptoms of low blood glucose, such as, dizziness, altered mental status, or cool/clammy skin. In addition, monitor for signs/symptoms of high blood glucose, such as, feeling hot, dry, fatigued, or with increased thirst/urination. Make regular podiatry appointments in order to have feet checked for wounds and uncontrolled toe nail growth to prevent infections, as well as, appointments with an ophthalmologist to monitor your vision.    Diagnosis: Positive hepatitis C antibody test  Assessment and Plan of Treatment: You were foudn to have a positive hepatitis C screening. You were seen by the liver doctors with no need for acute intervention at this time. Follow up with them in their office as an outpatient within 1 month for further monitoring and management recommendations.    Diagnosis: Liver cirrhosis  Assessment and Plan of Treatment: Your abdominal ultrasound and MRCP revealed evidence of chronic liver (cirrhosis). You were seen by the liver doctors with no need for acute intervention at this time. Follow up with them in their office as an outpatient within 1 month for further monitoring and management recommendations.     PRINCIPAL DISCHARGE DIAGNOSIS  Diagnosis: Pneumonia of left upper lobe due to infectious organism  Assessment and Plan of Treatment: A course of antibiotics was completed during your hospital stay -  Avycaz as prescribed for a total of 7 days. Monitor for worsening of disease, such as, increased cough/sputum, difficulty breathing, fever/chills, or changes in mental status. Follow-up with your primary care provider and pulmonoloigist Dr. Young as an outpatient for further medical care/recommendations.      SECONDARY DISCHARGE DIAGNOSES  Diagnosis: Shortness of breath  Assessment and Plan of Treatment: Likely secondary to pneumonia and underlying bronchiectasis. Continue your medications as prescribed.    Diagnosis: ABPA (allergic bronchopulmonary aspergillosis)  Assessment and Plan of Treatment: Please continue to use your inhalers as prescribed and follow-up with your primary care provider/pulmonologist for further care/recommendations. Monitor for signs/symptoms of exacerbation, such as, shortness of breath, increased sputum production, increased cough, wheezing, difficulty breathing, or fever - Report to the emergency room if symptoms are not relieved by usual regimen.    Diagnosis: Type 2 diabetes mellitus with hyperglycemia, with long-term current use of insulin  Assessment and Plan of Treatment: You HgbA1c was 8.5%. Your insulin regimen was adjusted due to low blood sugar. Continue checking blood sugars and use insulin as directed. Your sugars were found to be low at bedtime/ at night, we recommend you having a bedtime snack before going to sleep to keep sugars within normal range.   Continue your medication regimen and a consistent carbohydrate diet (Meaning eating the same amount of carbohydrates at the same time each day). Monitor blood glucose levels throughout the day before meals and at bedtime. Record blood sugars and bring to outpatient providers appointment in order to be reviewed by your doctor for management modifications. If your sugars are more than 400 or less than 70 you should contact your PCP immediately. Monitor for signs/symptoms of low blood glucose, such as, dizziness, altered mental status, or cool/clammy skin. In addition, monitor for signs/symptoms of high blood glucose, such as, feeling hot, dry, fatigued, or with increased thirst/urination. Make regular podiatry appointments in order to have feet checked for wounds and uncontrolled toe nail growth to prevent infections, as well as, appointments with an ophthalmologist to monitor your vision.    Diagnosis: Positive hepatitis C antibody test  Assessment and Plan of Treatment: You were foudn to have a positive hepatitis C screening. You were seen by the liver doctors with no need for acute intervention at this time. Follow up with them in their office as an outpatient within 1 month for further monitoring and management recommendations. You will need cirrhosis management (Abdominal US, variceal screening, labs) outpatient - Please call [phone] to schedule. *****    Diagnosis: Liver cirrhosis  Assessment and Plan of Treatment: Your abdominal ultrasound and MRCP revealed evidence of chronic liver (cirrhosis). You were seen by the liver doctors with no need for acute intervention at this time. Follow up with them in their office as an outpatient within 1 month for further monitoring and management recommendations.  You will need cirrhosis management (Abdominal US, variceal screening, labs) outpatient - Please call [phone] to schedule. *****    Diagnosis: Hypertension  Assessment and Plan of Treatment: Continue blood pressure medication regimen as directed. Monitor for any visual changes, headaches or dizziness.  Monitor blood pressure regularly.  Follow up with your PCP for further management for high blood pressure.

## 2021-04-12 DIAGNOSIS — K74.60 UNSPECIFIED CIRRHOSIS OF LIVER: ICD-10-CM

## 2021-04-12 LAB
ANION GAP SERPL CALC-SCNC: 7 MMOL/L — SIGNIFICANT CHANGE UP (ref 7–14)
BUN SERPL-MCNC: 14 MG/DL — SIGNIFICANT CHANGE UP (ref 7–23)
CALCIUM SERPL-MCNC: 9.7 MG/DL — SIGNIFICANT CHANGE UP (ref 8.4–10.5)
CHLORIDE SERPL-SCNC: 100 MMOL/L — SIGNIFICANT CHANGE UP (ref 98–107)
CO2 SERPL-SCNC: 28 MMOL/L — SIGNIFICANT CHANGE UP (ref 22–31)
CREAT SERPL-MCNC: 0.49 MG/DL — LOW (ref 0.5–1.3)
GLUCOSE BLDC GLUCOMTR-MCNC: 119 MG/DL — HIGH (ref 70–99)
GLUCOSE BLDC GLUCOMTR-MCNC: 139 MG/DL — HIGH (ref 70–99)
GLUCOSE BLDC GLUCOMTR-MCNC: 208 MG/DL — HIGH (ref 70–99)
GLUCOSE BLDC GLUCOMTR-MCNC: 232 MG/DL — HIGH (ref 70–99)
GLUCOSE BLDC GLUCOMTR-MCNC: 242 MG/DL — HIGH (ref 70–99)
GLUCOSE BLDC GLUCOMTR-MCNC: 331 MG/DL — HIGH (ref 70–99)
GLUCOSE BLDC GLUCOMTR-MCNC: 57 MG/DL — LOW (ref 70–99)
GLUCOSE BLDC GLUCOMTR-MCNC: 58 MG/DL — LOW (ref 70–99)
GLUCOSE SERPL-MCNC: 79 MG/DL — SIGNIFICANT CHANGE UP (ref 70–99)
HCT VFR BLD CALC: 41.3 % — SIGNIFICANT CHANGE UP (ref 34.5–45)
HGB BLD-MCNC: 13 G/DL — SIGNIFICANT CHANGE UP (ref 11.5–15.5)
MAGNESIUM SERPL-MCNC: 2.1 MG/DL — SIGNIFICANT CHANGE UP (ref 1.6–2.6)
MCHC RBC-ENTMCNC: 27.7 PG — SIGNIFICANT CHANGE UP (ref 27–34)
MCHC RBC-ENTMCNC: 31.5 GM/DL — LOW (ref 32–36)
MCV RBC AUTO: 87.9 FL — SIGNIFICANT CHANGE UP (ref 80–100)
NRBC # BLD: 0 /100 WBCS — SIGNIFICANT CHANGE UP
NRBC # FLD: 0 K/UL — SIGNIFICANT CHANGE UP
PHOSPHATE SERPL-MCNC: 3.2 MG/DL — SIGNIFICANT CHANGE UP (ref 2.5–4.5)
PLATELET # BLD AUTO: 224 K/UL — SIGNIFICANT CHANGE UP (ref 150–400)
POTASSIUM SERPL-MCNC: 3.9 MMOL/L — SIGNIFICANT CHANGE UP (ref 3.5–5.3)
POTASSIUM SERPL-SCNC: 3.9 MMOL/L — SIGNIFICANT CHANGE UP (ref 3.5–5.3)
RBC # BLD: 4.7 M/UL — SIGNIFICANT CHANGE UP (ref 3.8–5.2)
RBC # FLD: 14 % — SIGNIFICANT CHANGE UP (ref 10.3–14.5)
SMOOTH MUSCLE AB SER-ACNC: ABNORMAL
SODIUM SERPL-SCNC: 135 MMOL/L — SIGNIFICANT CHANGE UP (ref 135–145)
WBC # BLD: 11.34 K/UL — HIGH (ref 3.8–10.5)
WBC # FLD AUTO: 11.34 K/UL — HIGH (ref 3.8–10.5)

## 2021-04-12 PROCEDURE — 99232 SBSQ HOSP IP/OBS MODERATE 35: CPT

## 2021-04-12 PROCEDURE — 99233 SBSQ HOSP IP/OBS HIGH 50: CPT

## 2021-04-12 RX ORDER — AMLODIPINE BESYLATE 2.5 MG/1
5 TABLET ORAL DAILY
Refills: 0 | Status: DISCONTINUED | OUTPATIENT
Start: 2021-04-12 | End: 2021-04-18

## 2021-04-12 RX ADMIN — BUDESONIDE AND FORMOTEROL FUMARATE DIHYDRATE 2 PUFF(S): 160; 4.5 AEROSOL RESPIRATORY (INHALATION) at 09:11

## 2021-04-12 RX ADMIN — INSULIN GLARGINE 22 UNIT(S): 100 INJECTION, SOLUTION SUBCUTANEOUS at 23:31

## 2021-04-12 RX ADMIN — BENZOCAINE AND MENTHOL 1 LOZENGE: 5; 1 LIQUID ORAL at 13:37

## 2021-04-12 RX ADMIN — Medication 3 MILLILITER(S): at 15:16

## 2021-04-12 RX ADMIN — Medication 100 MILLIGRAM(S): at 06:37

## 2021-04-12 RX ADMIN — Medication 5 UNIT(S): at 17:31

## 2021-04-12 RX ADMIN — Medication 4: at 17:32

## 2021-04-12 RX ADMIN — Medication 500000 UNIT(S): at 09:12

## 2021-04-12 RX ADMIN — DIPHENHYDRAMINE HYDROCHLORIDE AND LIDOCAINE HYDROCHLORIDE AND ALUMINUM HYDROXIDE AND MAGNESIUM HYDRO 10 MILLILITER(S): KIT at 13:38

## 2021-04-12 RX ADMIN — DIPHENHYDRAMINE HYDROCHLORIDE AND LIDOCAINE HYDROCHLORIDE AND ALUMINUM HYDROXIDE AND MAGNESIUM HYDRO 10 MILLILITER(S): KIT at 06:37

## 2021-04-12 RX ADMIN — Medication 1 TABLET(S): at 13:37

## 2021-04-12 RX ADMIN — BENZOCAINE AND MENTHOL 1 LOZENGE: 5; 1 LIQUID ORAL at 06:37

## 2021-04-12 RX ADMIN — BENZOCAINE AND MENTHOL 1 LOZENGE: 5; 1 LIQUID ORAL at 23:31

## 2021-04-12 RX ADMIN — Medication 2: at 12:50

## 2021-04-12 RX ADMIN — Medication 20 MILLIGRAM(S): at 06:37

## 2021-04-12 RX ADMIN — Medication 3 MILLILITER(S): at 10:37

## 2021-04-12 RX ADMIN — Medication 37.5 MILLIGRAM(S): at 13:39

## 2021-04-12 RX ADMIN — MONTELUKAST 10 MILLIGRAM(S): 4 TABLET, CHEWABLE ORAL at 13:39

## 2021-04-12 RX ADMIN — ENOXAPARIN SODIUM 40 MILLIGRAM(S): 100 INJECTION SUBCUTANEOUS at 13:37

## 2021-04-12 RX ADMIN — DIPHENHYDRAMINE HYDROCHLORIDE AND LIDOCAINE HYDROCHLORIDE AND ALUMINUM HYDROXIDE AND MAGNESIUM HYDRO 10 MILLILITER(S): KIT at 21:58

## 2021-04-12 RX ADMIN — SODIUM CHLORIDE 4 MILLILITER(S): 9 INJECTION INTRAMUSCULAR; INTRAVENOUS; SUBCUTANEOUS at 23:45

## 2021-04-12 RX ADMIN — BUDESONIDE AND FORMOTEROL FUMARATE DIHYDRATE 2 PUFF(S): 160; 4.5 AEROSOL RESPIRATORY (INHALATION) at 21:56

## 2021-04-12 RX ADMIN — SODIUM CHLORIDE 4 MILLILITER(S): 9 INJECTION INTRAMUSCULAR; INTRAVENOUS; SUBCUTANEOUS at 10:38

## 2021-04-12 RX ADMIN — Medication 5 UNIT(S): at 12:50

## 2021-04-12 RX ADMIN — Medication 500000 UNIT(S): at 03:26

## 2021-04-12 RX ADMIN — Medication 5 UNIT(S): at 09:11

## 2021-04-12 RX ADMIN — Medication 300 MILLIGRAM(S): at 10:38

## 2021-04-12 RX ADMIN — Medication 3 MILLILITER(S): at 23:44

## 2021-04-12 RX ADMIN — SODIUM CHLORIDE 4 MILLILITER(S): 9 INJECTION INTRAMUSCULAR; INTRAVENOUS; SUBCUTANEOUS at 15:16

## 2021-04-12 RX ADMIN — Medication 325 MILLIGRAM(S): at 13:38

## 2021-04-12 RX ADMIN — Medication 100 MILLIGRAM(S): at 13:41

## 2021-04-12 RX ADMIN — SENNA PLUS 2 TABLET(S): 8.6 TABLET ORAL at 22:01

## 2021-04-12 RX ADMIN — Medication 100 MILLIGRAM(S): at 21:57

## 2021-04-12 RX ADMIN — Medication 1 SPRAY(S): at 13:38

## 2021-04-12 NOTE — PROGRESS NOTE ADULT - SUBJECTIVE AND OBJECTIVE BOX
Chief Complaint:  Patient is a 66y old  Female who presents with a chief complaint of Pneumonia (12 Apr 2021 12:13)      Interval Events: Pt feels well; Denies nausea, vomiting, abdominal pain.   ROS: All 12 point system except listed above were otherwise negative.    Allergies:  No Known Allergies        Hospital Medications:  acetaminophen   Tablet .. 650 milliGRAM(s) Oral every 6 hours PRN  albuterol/ipratropium for Nebulization 3 milliLiter(s) Nebulizer every 8 hours  amLODIPine   Tablet 5 milliGRAM(s) Oral daily  benzocaine 15 mG/menthol 3.6 mG (Sugar-Free) Lozenge 1 Lozenge Oral every 6 hours  benzonatate 100 milliGRAM(s) Oral three times a day  budesonide 160 MICROgram(s)/formoterol 4.5 MICROgram(s) Inhaler 2 Puff(s) Inhalation two times a day  calcium carbonate 1250 mG  + Vitamin D (OsCal 500 + D) 1 Tablet(s) Oral daily  ceftazidime/avibactam IVPB 2.5 Gram(s) IV Intermittent every 8 hours  cyanocobalamin Injectable 1000 MICROGram(s) IntraMuscular <User Schedule>  dextrose 40% Gel 15 Gram(s) Oral once  dextrose 5%. 1000 milliLiter(s) IV Continuous <Continuous>  dextrose 5%. 1000 milliLiter(s) IV Continuous <Continuous>  dextrose 50% Injectable 25 Gram(s) IV Push once  dextrose 50% Injectable 12.5 Gram(s) IV Push once  dextrose 50% Injectable 25 Gram(s) IV Push once  dextrose 50% Injectable 25 Gram(s) IV Push once  enoxaparin Injectable 40 milliGRAM(s) SubCutaneous daily  ferrous    sulfate 325 milliGRAM(s) Oral daily  FIRST- Mouthwash  BLM 10 milliLiter(s) Swish and Spit every 8 hours  fluticasone propionate 50 MICROgram(s)/spray Nasal Spray 1 Spray(s) Both Nostrils <User Schedule>  glucagon  Injectable 1 milliGRAM(s) IntraMuscular once  influenza   Vaccine 0.5 milliLiter(s) IntraMuscular once  insulin glargine Injectable (LANTUS) 22 Unit(s) SubCutaneous at bedtime  insulin lispro (ADMELOG) corrective regimen sliding scale   SubCutaneous three times a day before meals  insulin lispro (ADMELOG) corrective regimen sliding scale   SubCutaneous at bedtime  insulin lispro Injectable (ADMELOG) 5 Unit(s) SubCutaneous three times a day before meals  montelukast 10 milliGRAM(s) Oral daily  pantoprazole    Tablet 40 milliGRAM(s) Oral before breakfast  polyethylene glycol 3350 17 Gram(s) Oral daily  predniSONE   Tablet 20 milliGRAM(s) Oral daily  senna 2 Tablet(s) Oral at bedtime  sodium chloride 7% Inhalation 4 milliLiter(s) Inhalation every 8 hours  tiotropium 18 MICROgram(s) Capsule 1 Capsule(s) Inhalation daily  tobramycin for Nebulization 300 milliGRAM(s) Inhalation every 12 hours  venlafaxine XR. 37.5 milliGRAM(s) Oral daily      PMHX/PSHX:  DM (diabetes mellitus)    Bronchitis    ABPA (allergic bronchopulmonary aspergillosis)    Bronchiectasis    Asthma    Hypertension    Vitamin D deficiency    Microcytic anemia    GERD (gastroesophageal reflux disease)    Postnasal drip    Pseudomonas aeruginosa colonization    Allergic rhinitis    Recurrent pneumonia    Type 2 diabetes mellitus, with long-term current use of insulin    History of cholecystectomy        Family history:  No pertinent family history in first degree relatives    Family history of diabetes mellitus    Family history of allergic rhinitis (Child, Child)    Family history of bronchitis      There is no family history of peptic ulcer disease, gastric cancer, colon polyps, colon cancer, celiac disease, biliary, hepatic, or pancreatic disease.  None of the female relatives have breast, uterine, or ovarian cancer.     ROS:     General:  No wt loss, fevers, chills, night sweats, fatigue,   Eyes:  Good vision, no reported pain  ENT:  No sore throat, pain, runny nose, dysphagia  CV:  No pain, palpitations, hypo/hypertension  Pulm:  No dyspnea, cough, tachypnea, wheezing  GI: see above  :  No pain, bleeding, incontinence, nocturia  Muscle:  No pain, weakness  Neuro:  No weakness, tingling, memory problems  Psych:  No fatigue, insomnia, mood problems, depression  Endocrine:  No polyuria, polydipsia, cold/heat intolerance  Heme:  No petechiae, ecchymosis, easy bruisability  Skin:  No rash, tattoos, scars, edema        PHYSICAL EXAM:   Vital Signs:  Vital Signs Last 24 Hrs  T(C): 36.3 (12 Apr 2021 12:42), Max: 36.7 (11 Apr 2021 23:17)  T(F): 97.4 (12 Apr 2021 12:42), Max: 98 (11 Apr 2021 23:17)  HR: 95 (12 Apr 2021 12:42) (82 - 110)  BP: 137/79 (12 Apr 2021 12:42) (137/79 - 145/77)  BP(mean): --  RR: 18 (12 Apr 2021 12:42) (18 - 18)  SpO2: 95% (12 Apr 2021 12:42) (95% - 98%)  Daily     Daily     GENERAL:  No acute distress  HEENT:  Normocephalic/atraumtic,  no scleral icterus  CHEST:  Clear to auscultation bilaterally, no wheezes/rales/ronchi, no accessory muscle use  HEART:  Regular rate and rhythm, no murmurs/rubs/gallops  ABDOMEN:  Soft, non-tender, non-distended, normoactive bowel sounds, no masses, no hepato-splenomegaly, no signs of chronic liver disease  EXTREMITIES:  No cyanosis, clubbing, or edema  SKIN:  No rash/erythema/ecchymoses/petechiae/wounds/abscess/warm/dry  NEURO:  Alert and oriented x 3, no asterixis, no tremor    LABS:                        13.0   11.34 )-----------( 224      ( 12 Apr 2021 06:54 )             41.3     Mean Cell Volume: 87.9 fL (04-12-21 @ 06:54)    04-12    135  |  100  |  14  ----------------------------<  79  3.9   |  28  |  0.49<L>    Ca    9.7      12 Apr 2021 06:54  Phos  3.2     04-12  Mg     2.1     04-12                                    13.0   11.34 )-----------( 224      ( 12 Apr 2021 06:54 )             41.3                         12.2   10.37 )-----------( 225      ( 10 Apr 2021 07:00 )             37.9     Imaging:

## 2021-04-12 NOTE — PROGRESS NOTE ADULT - ASSESSMENT
66 y.o. 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 Pt now longer meeting sepsis criteria and is hemodynamically stable on IV Avycaz.

## 2021-04-12 NOTE — CHART NOTE - NSCHARTNOTEFT_GEN_A_CORE
PA Note    Called by nurse to find out if she should give the full dose of Lantus 22 units as ordered.    As per JOSUE Cervantes, Ms Sky was complaining of feeling sweaty earlier around 21:00.  Fingerstick was 57.  She gave Ms. Sky something to eat and drink, now her Fingerstick is 208 one hour later.   Chart reviewed, Pt has labile fingerstick, currently on Lantus 22 units and Ademolog 5 units TID Pre-meal.   Called Attending on call, Dr. Galvez to discuss plan,   He recommends to  Consider Endo Cs   Will continue to monitor PA Note    Called by nurse to find out if she should give the full dose of Lantus 22 units as ordered.    As per JOSUE Cervantes, Ms Sky was complaining of feeling sweaty earlier around 21:00.  Fingerstick was 57.  She gave Ms. Sky something to eat and drink, now her Fingerstick is 208 one hour later.   Chart reviewed, Pt has labile fingerstick, currently on Lantus 22 units and Ademolog 5 units TID Pre-meal.   Called Attending on call, Dr. Galvez to discuss plan,   He recommends to continue Lantus 22 units at bedtime   Consider Endo Cs   Will continue to monitor

## 2021-04-12 NOTE — PROGRESS NOTE ADULT - PROBLEM SELECTOR PLAN 1
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 Initially started on Zosyn but transitioned to Avycaz on 4/8/21. ID following, recs appreciated  - c/w Avycaz, currently on day 4 of 14  - c/w inhaled tobramycin 300mg q12; currently on day 12  - f/u ID recs  - BCx x2 NGTD.    - c/w nebulized budesonide, PRN albuterol nebs, and inhaled hypertonic saline.   - c/w Chest PT  - f/u pulm recs

## 2021-04-12 NOTE — PROGRESS NOTE ADULT - PROBLEM SELECTOR PLAN 3
Newly diagnosed on ultrasound and MRCP. Noted Hep C AB reactive, but RNA not detected --> indicative of past infection that cleared. HIV neg. RUPESH, Mitochondrial Ab, microsomal Ab assay, all negative. ceruloplasmin normal, alpha fetoprotein normal Ca 19-9 mildly elevated at 42  - Hepatology consulted, recs appreciated --> f/u with hepatology

## 2021-04-12 NOTE — PROGRESS NOTE ADULT - PROBLEM SELECTOR PLAN 2
- Cont prednisone 20mg daily dosing as per pulm recs  - d/c nystatin since evaluated by ENT and no thrush noted -->no further ENT recs  - c/w pantoprazole 40mg for GI ppx while on steroids

## 2021-04-12 NOTE — PROGRESS NOTE ADULT - SUBJECTIVE AND OBJECTIVE BOX
Follow Up:  Bronchiectasis exacerbation, PNA    Interval History/ROS: Daughter interpreted over the phone. She feels ok, breathing is better, less cough. No fevers or chills. No pain. No diarrhea.     Allergies  No Known Allergies        ANTIMICROBIALS:  ceftazidime/avibactam IVPB 2.5 every 8 hours  tobramycin for Nebulization 300 every 12 hours      OTHER MEDS:  acetaminophen   Tablet .. 650 milliGRAM(s) Oral every 6 hours PRN  albuterol/ipratropium for Nebulization 3 milliLiter(s) Nebulizer every 8 hours  amLODIPine   Tablet 5 milliGRAM(s) Oral daily  benzocaine 15 mG/menthol 3.6 mG (Sugar-Free) Lozenge 1 Lozenge Oral every 6 hours  benzonatate 100 milliGRAM(s) Oral three times a day  budesonide 160 MICROgram(s)/formoterol 4.5 MICROgram(s) Inhaler 2 Puff(s) Inhalation two times a day  calcium carbonate 1250 mG  + Vitamin D (OsCal 500 + D) 1 Tablet(s) Oral daily  cyanocobalamin Injectable 1000 MICROGram(s) IntraMuscular <User Schedule>  dextrose 40% Gel 15 Gram(s) Oral once  dextrose 5%. 1000 milliLiter(s) IV Continuous <Continuous>  dextrose 5%. 1000 milliLiter(s) IV Continuous <Continuous>  dextrose 50% Injectable 25 Gram(s) IV Push once  dextrose 50% Injectable 12.5 Gram(s) IV Push once  dextrose 50% Injectable 25 Gram(s) IV Push once  dextrose 50% Injectable 25 Gram(s) IV Push once  enoxaparin Injectable 40 milliGRAM(s) SubCutaneous daily  ferrous    sulfate 325 milliGRAM(s) Oral daily  FIRST- Mouthwash  BLM 10 milliLiter(s) Swish and Spit every 8 hours  fluticasone propionate 50 MICROgram(s)/spray Nasal Spray 1 Spray(s) Both Nostrils <User Schedule>  glucagon  Injectable 1 milliGRAM(s) IntraMuscular once  influenza   Vaccine 0.5 milliLiter(s) IntraMuscular once  insulin glargine Injectable (LANTUS) 22 Unit(s) SubCutaneous at bedtime  insulin lispro (ADMELOG) corrective regimen sliding scale   SubCutaneous at bedtime  insulin lispro (ADMELOG) corrective regimen sliding scale   SubCutaneous three times a day before meals  insulin lispro Injectable (ADMELOG) 5 Unit(s) SubCutaneous three times a day before meals  montelukast 10 milliGRAM(s) Oral daily  pantoprazole    Tablet 40 milliGRAM(s) Oral before breakfast  polyethylene glycol 3350 17 Gram(s) Oral daily  predniSONE   Tablet 20 milliGRAM(s) Oral daily  senna 2 Tablet(s) Oral at bedtime  sodium chloride 7% Inhalation 4 milliLiter(s) Inhalation every 8 hours  tiotropium 18 MICROgram(s) Capsule 1 Capsule(s) Inhalation daily  venlafaxine XR. 37.5 milliGRAM(s) Oral daily      Vital Signs Last 24 Hrs  T(C): 36.3 (12 Apr 2021 12:42), Max: 36.7 (11 Apr 2021 23:17)  T(F): 97.4 (12 Apr 2021 12:42), Max: 98 (11 Apr 2021 23:17)  HR: 102 (12 Apr 2021 15:16) (82 - 102)  BP: 137/79 (12 Apr 2021 12:42) (137/79 - 145/77)  BP(mean): --  RR: 18 (12 Apr 2021 12:42) (18 - 18)  SpO2: 95% (12 Apr 2021 12:42) (95% - 96%)    Physical Exam:  General: awake, alert, non toxic  Head: atraumatic, normocephalic  Eye: normal sclera and conjunctiva  Cardio: regular rate   Respiratory: nonlabored on room air  psych: normal affect                          13.0   11.34 )-----------( 224      ( 12 Apr 2021 06:54 )             41.3       04-12    135  |  100  |  14  ----------------------------<  79  3.9   |  28  |  0.49<L>    Ca    9.7      12 Apr 2021 06:54  Phos  3.2     04-12  Mg     2.1     04-12            MICROBIOLOGY:  Culture - Sputum (collected 04-04-21 @ 14:28)  Source: .Sputum Sputum  Gram Stain (04-04-21 @ 19:59):    Rare Squamous epithelial cells per low power field    Rare polymorphonuclear leukocytes per low power field    Rare Yeast like cells per oil power field    Rare Gram positive cocci in pairs per oil power field    Rare Gram Variable Rods per oil power field  Final Report (04-08-21 @ 12:23):    Rare Pseudomonas aeruginosa (Carbapenem Resistant)    Normal Respiratory Denise present  Organism: Pseudomonas aeruginosa (Carbapenem Resistant) (04-08-21 @ 14:24)      -  Amikacin: S <=16      -  Aztreonam: R >16      -  Cefepime: R >16      -  Ceftazidime: S 4      -  Ceftazidime/Avibactam: S <=4      -  Ceftolozane/tazobactam: S <=2      -  Ciprofloxacin: R >2      -  Gentamicin: S 4      -  Imipenem: R >8      -  Levofloxacin: R >4      -  Meropenem: I 4      -  Piperacillin/Tazobactam: I 32      -  Tobramycin: S <=2      Method Type: ELIEZER  Organism: Pseudomonas aeruginosa (Carbapenem Resistant) (04-08-21 @ 12:23)      RADIOLOGY:  Images below reviewed personally  CT Chest No Cont (03.31.21 @ 21:18)   When compared to5/14/2019, new groundglass opacities and superimposed areas of consolidation seen in the left apex and anterior portion of the left upper lobe concerning for pneumonia.  Chronic findings of bronchiectasis/bronchial wall thickening and mucoid impacted airways..

## 2021-04-12 NOTE — PROGRESS NOTE ADULT - ASSESSMENT
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  CLD work up: HBsAG neg, HbcIgM neg, IgG, IgA, IgM wnl, RUPESH, anti-LKM negative, Hepatitis A, B serologies negative, Hep C RNA negative, ferritin 169. Pending SLA, ASMA, Hep E IgG    Rec:  - Follow up remaining liver work up  - Monitor CBC, INR, CMP  - Patient will need outpatient follow up for cirrhosis management (Abdominal US, variceal screening, labs). Please call [phone] to schedule.     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  CLD work up: HBsAG neg, HbcIgM neg, IgG, IgA, IgM wnl, RUPESH, anti-LKM negative, Hepatitis A, B serologies negative, Hep C RNA negative, ferritin 169. Pending SLA, ASMA, Hep E IgG    Rec:  - Follow up remaining liver work up  - Monitor CBC, INR, CMP  - Patient will need outpatient follow up for cirrhosis management (Abdominal US, variceal screening, labs). Please call [phone] to schedule.     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  CLD work up: HBsAG neg, HbcIgM neg, IgG, IgA, IgM wnl, RUPESH, anti-LKM negative, Hepatitis A, B serologies negative, Hep C RNA negative, ferritin 169. Pending SLA, ASMA, Hep E IgG    Rec:  - Follow up remaining liver work up  - Monitor CBC, INR, CMP  - Patient will need outpatient follow up for cirrhosis management (Abdominal US, variceal screening, labs). Please call [phone] to schedule.    Tasha Ariza, PGY6  Gastroenterology Fellow  Pager # [phone] / [phone]  Can be contacted via [employer] Teams    For nights/weekends/holidays, contact on-call GI fellow via answering service ([phone])

## 2021-04-12 NOTE — PROGRESS NOTE ADULT - SUBJECTIVE AND OBJECTIVE BOX
Pulmonary Progress Note     Interval Events:    SUBJECTIVE:    OBJECTIVE:  ICU Vital Signs Last 24 Hrs  T(C): 36.3 (12 Apr 2021 12:42), Max: 36.7 (11 Apr 2021 23:17)  T(F): 97.4 (12 Apr 2021 12:42), Max: 98 (11 Apr 2021 23:17)  HR: 102 (12 Apr 2021 15:16) (82 - 102)  BP: 137/79 (12 Apr 2021 12:42) (137/79 - 145/77)  BP(mean): --  ABP: --  ABP(mean): --  RR: 18 (12 Apr 2021 12:42) (18 - 18)  SpO2: 95% (12 Apr 2021 12:42) (95% - 96%)        CAPILLARY BLOOD GLUCOSE      POCT Blood Glucose.: 331 mg/dL (12 Apr 2021 17:12)      PHYSICAL EXAM:  GENERAL: NAD, well-groomed, well-developed  HEAD:  Atraumatic, Normocephalic  EYES: EOMI, conjunctiva and sclera clear  NERVOUS SYSTEM:  Alert & Oriented X3, Good concentration; Moves all extremities  CHEST/LUNG: Clear to percussion bilaterally; No rales, rhonchi, wheezing, or rubs  HEART: Regular rate and rhythm; No murmurs, rubs, or gallops  ABDOMEN: Soft, Nontender, Nondistended;   EXTREMITIES:  2+ Peripheral Pulses, No LE edema  SKIN: No rashes or lesions    HOSPITAL MEDICATIONS:  MEDICATIONS  (STANDING):  albuterol/ipratropium for Nebulization 3 milliLiter(s) Nebulizer every 8 hours  amLODIPine   Tablet 5 milliGRAM(s) Oral daily  benzocaine 15 mG/menthol 3.6 mG (Sugar-Free) Lozenge 1 Lozenge Oral every 6 hours  benzonatate 100 milliGRAM(s) Oral three times a day  budesonide 160 MICROgram(s)/formoterol 4.5 MICROgram(s) Inhaler 2 Puff(s) Inhalation two times a day  calcium carbonate 1250 mG  + Vitamin D (OsCal 500 + D) 1 Tablet(s) Oral daily  ceftazidime/avibactam IVPB 2.5 Gram(s) IV Intermittent every 8 hours  cyanocobalamin Injectable 1000 MICROGram(s) IntraMuscular <User Schedule>  dextrose 40% Gel 15 Gram(s) Oral once  dextrose 5%. 1000 milliLiter(s) (50 mL/Hr) IV Continuous <Continuous>  dextrose 5%. 1000 milliLiter(s) (100 mL/Hr) IV Continuous <Continuous>  dextrose 50% Injectable 25 Gram(s) IV Push once  dextrose 50% Injectable 12.5 Gram(s) IV Push once  dextrose 50% Injectable 25 Gram(s) IV Push once  dextrose 50% Injectable 25 Gram(s) IV Push once  enoxaparin Injectable 40 milliGRAM(s) SubCutaneous daily  ferrous    sulfate 325 milliGRAM(s) Oral daily  FIRST- Mouthwash  BLM 10 milliLiter(s) Swish and Spit every 8 hours  fluticasone propionate 50 MICROgram(s)/spray Nasal Spray 1 Spray(s) Both Nostrils <User Schedule>  glucagon  Injectable 1 milliGRAM(s) IntraMuscular once  influenza   Vaccine 0.5 milliLiter(s) IntraMuscular once  insulin glargine Injectable (LANTUS) 22 Unit(s) SubCutaneous at bedtime  insulin lispro (ADMELOG) corrective regimen sliding scale   SubCutaneous at bedtime  insulin lispro (ADMELOG) corrective regimen sliding scale   SubCutaneous three times a day before meals  insulin lispro Injectable (ADMELOG) 5 Unit(s) SubCutaneous three times a day before meals  montelukast 10 milliGRAM(s) Oral daily  pantoprazole    Tablet 40 milliGRAM(s) Oral before breakfast  polyethylene glycol 3350 17 Gram(s) Oral daily  predniSONE   Tablet 20 milliGRAM(s) Oral daily  senna 2 Tablet(s) Oral at bedtime  sodium chloride 7% Inhalation 4 milliLiter(s) Inhalation every 8 hours  tiotropium 18 MICROgram(s) Capsule 1 Capsule(s) Inhalation daily  tobramycin for Nebulization 300 milliGRAM(s) Inhalation every 12 hours  venlafaxine XR. 37.5 milliGRAM(s) Oral daily    MEDICATIONS  (PRN):  acetaminophen   Tablet .. 650 milliGRAM(s) Oral every 6 hours PRN Mild Pain (1 - 3)      LABS:                        13.0   11.34 )-----------( 224      ( 12 Apr 2021 06:54 )             41.3     Hgb Trend: 13.0<--, 12.2<--, 11.9<--, 12.1<--, 11.6<--  04-12    135  |  100  |  14  ----------------------------<  79  3.9   |  28  |  0.49<L>    Ca    9.7      12 Apr 2021 06:54  Phos  3.2     04-12  Mg     2.1     04-12      Creatinine Trend: 0.49<--, 0.47<--, 0.49<--, 0.50<--, 0.53<--, 0.42<--            MICROBIOLOGY:       RADIOLOGY:  [ ] Reviewed and interpreted by me   Pulmonary Progress Note     Interval Events:  No acute events overnight    SUBJECTIVE:  Feeling OK, overall breathing improved, cough less productive of mucus    OBJECTIVE:  ICU Vital Signs Last 24 Hrs  T(C): 36.3 (12 Apr 2021 12:42), Max: 36.7 (11 Apr 2021 23:17)  T(F): 97.4 (12 Apr 2021 12:42), Max: 98 (11 Apr 2021 23:17)  HR: 102 (12 Apr 2021 15:16) (82 - 102)  BP: 137/79 (12 Apr 2021 12:42) (137/79 - 145/77)  RR: 18 (12 Apr 2021 12:42) (18 - 18)  SpO2: 95% (12 Apr 2021 12:42) (95% - 96%)    CAPILLARY BLOOD GLUCOSE  POCT Blood Glucose.: 331 mg/dL (12 Apr 2021 17:12)    PHYSICAL EXAM:  GENERAL: NAD, well-groomed, well-developed  HEAD:  Atraumatic, Normocephalic  EYES: EOMI, conjunctiva and sclera clear  NERVOUS SYSTEM:  Alert & Oriented X3, Good concentration; Moves all extremities  CHEST/LUNG: b/l Rhonchi, +and exp wheezing.   HEART: Regular rate and rhythm; No murmurs, rubs, or gallops  ABDOMEN: Soft, Nontender, Nondistended;   EXTREMITIES:  2+ Peripheral Pulses, No LE edema  SKIN: No rashes or lesions    HOSPITAL MEDICATIONS:  MEDICATIONS  (STANDING):  albuterol/ipratropium for Nebulization 3 milliLiter(s) Nebulizer every 8 hours  amLODIPine   Tablet 5 milliGRAM(s) Oral daily  benzocaine 15 mG/menthol 3.6 mG (Sugar-Free) Lozenge 1 Lozenge Oral every 6 hours  benzonatate 100 milliGRAM(s) Oral three times a day  budesonide 160 MICROgram(s)/formoterol 4.5 MICROgram(s) Inhaler 2 Puff(s) Inhalation two times a day  calcium carbonate 1250 mG  + Vitamin D (OsCal 500 + D) 1 Tablet(s) Oral daily  ceftazidime/avibactam IVPB 2.5 Gram(s) IV Intermittent every 8 hours  cyanocobalamin Injectable 1000 MICROGram(s) IntraMuscular <User Schedule>  dextrose 40% Gel 15 Gram(s) Oral once  dextrose 5%. 1000 milliLiter(s) (50 mL/Hr) IV Continuous <Continuous>  dextrose 5%. 1000 milliLiter(s) (100 mL/Hr) IV Continuous <Continuous>  dextrose 50% Injectable 25 Gram(s) IV Push once  dextrose 50% Injectable 12.5 Gram(s) IV Push once  dextrose 50% Injectable 25 Gram(s) IV Push once  dextrose 50% Injectable 25 Gram(s) IV Push once  enoxaparin Injectable 40 milliGRAM(s) SubCutaneous daily  ferrous    sulfate 325 milliGRAM(s) Oral daily  FIRST- Mouthwash  BLM 10 milliLiter(s) Swish and Spit every 8 hours  fluticasone propionate 50 MICROgram(s)/spray Nasal Spray 1 Spray(s) Both Nostrils <User Schedule>  glucagon  Injectable 1 milliGRAM(s) IntraMuscular once  influenza   Vaccine 0.5 milliLiter(s) IntraMuscular once  insulin glargine Injectable (LANTUS) 22 Unit(s) SubCutaneous at bedtime  insulin lispro (ADMELOG) corrective regimen sliding scale   SubCutaneous at bedtime  insulin lispro (ADMELOG) corrective regimen sliding scale   SubCutaneous three times a day before meals  insulin lispro Injectable (ADMELOG) 5 Unit(s) SubCutaneous three times a day before meals  montelukast 10 milliGRAM(s) Oral daily  pantoprazole    Tablet 40 milliGRAM(s) Oral before breakfast  polyethylene glycol 3350 17 Gram(s) Oral daily  predniSONE   Tablet 20 milliGRAM(s) Oral daily  senna 2 Tablet(s) Oral at bedtime  sodium chloride 7% Inhalation 4 milliLiter(s) Inhalation every 8 hours  tiotropium 18 MICROgram(s) Capsule 1 Capsule(s) Inhalation daily  tobramycin for Nebulization 300 milliGRAM(s) Inhalation every 12 hours  venlafaxine XR. 37.5 milliGRAM(s) Oral daily    MEDICATIONS  (PRN):  acetaminophen   Tablet .. 650 milliGRAM(s) Oral every 6 hours PRN Mild Pain (1 - 3)    LABS:                        13.0   11.34 )-----------( 224      ( 12 Apr 2021 06:54 )             41.3     Hgb Trend: 13.0<--, 12.2<--, 11.9<--, 12.1<--, 11.6<--  04-12    135  |  100  |  14  ----------------------------<  79  3.9   |  28  |  0.49<L>    Ca    9.7      12 Apr 2021 06:54  Phos  3.2     04-12  Mg     2.1     04-12  Creatinine Trend: 0.49<--, 0.47<--, 0.49<--, 0.50<--, 0.53<--, 0.42<--

## 2021-04-12 NOTE — PROGRESS NOTE ADULT - ASSESSMENT
66F with primary ciliary dyskinesia, cystic bronchiectasis, ABPA, DM admitted 3/31/21 with acute respiratory exacerbation and a new KELLY opacity.   Chronic infection/colonization with Pseudomonas, now CR.   Afebrile, on room air and felt better with Zosyn which is intermediate.     Suggest  -Avycaz 2.5GM IV q8h, complete a 7 day course Thurs 4/15, a longer course is unlikely to make a difference   -continued inhaled Tobramycin  -if discharged can used a peripheral IV rather than placing a midline or PICC     Spoke with medicine    Sabino Nielsen MD   Infectious Disease   Pager [phone]   After 5PM and on weekends please page fellow on call or call [phone]

## 2021-04-12 NOTE — PROGRESS NOTE ADULT - SUBJECTIVE AND OBJECTIVE BOX
Patient is a 66y old  Female who presents with a chief complaint of Pneumonia (11 Apr 2021 11:10)      SUBJECTIVE / OVERNIGHT EVENTS:  Patient declined [employer]  phone services for Leandro translation, ID# [id number]. Pt prefers to use her daughter, Adriana, for translation instead.  Patient reports generalized weakness but overall feels better. Patient without fever, chills, chest pain, or SOB at rest. Pt with persistent cough with whitish sputum production.   O2 sat 92-94% on RA.    MEDICATIONS  (STANDING):  ALBUTerol    90 MICROgram(s) HFA Inhaler 1 Puff(s) Inhalation every 6 hours  albuterol/ipratropium for Nebulization 3 milliLiter(s) Nebulizer every 8 hours  benzocaine 15 mG/menthol 3.6 mG (Sugar-Free) Lozenge 1 Lozenge Oral every 6 hours  benzonatate 100 milliGRAM(s) Oral three times a day  budesonide 160 MICROgram(s)/formoterol 4.5 MICROgram(s) Inhaler 2 Puff(s) Inhalation two times a day  calcium carbonate 1250 mG  + Vitamin D (OsCal 500 + D) 1 Tablet(s) Oral daily  ceftazidime/avibactam IVPB 2.5 Gram(s) IV Intermittent every 8 hours  cyanocobalamin Injectable 1000 MICROGram(s) IntraMuscular <User Schedule>  dextrose 40% Gel 15 Gram(s) Oral once  dextrose 5%. 1000 milliLiter(s) (100 mL/Hr) IV Continuous <Continuous>  dextrose 5%. 1000 milliLiter(s) (50 mL/Hr) IV Continuous <Continuous>  dextrose 50% Injectable 25 Gram(s) IV Push once  dextrose 50% Injectable 12.5 Gram(s) IV Push once  dextrose 50% Injectable 25 Gram(s) IV Push once  dextrose 50% Injectable 25 Gram(s) IV Push once  enoxaparin Injectable 40 milliGRAM(s) SubCutaneous daily  ferrous    sulfate 325 milliGRAM(s) Oral daily  FIRST- Mouthwash  BLM 10 milliLiter(s) Swish and Spit every 8 hours  fluticasone propionate 50 MICROgram(s)/spray Nasal Spray 1 Spray(s) Both Nostrils <User Schedule>  glucagon  Injectable 1 milliGRAM(s) IntraMuscular once  influenza   Vaccine 0.5 milliLiter(s) IntraMuscular once  insulin glargine Injectable (LANTUS) 22 Unit(s) SubCutaneous at bedtime  insulin lispro (ADMELOG) corrective regimen sliding scale   SubCutaneous three times a day before meals  insulin lispro (ADMELOG) corrective regimen sliding scale   SubCutaneous at bedtime  insulin lispro Injectable (ADMELOG) 5 Unit(s) SubCutaneous three times a day before meals  montelukast 10 milliGRAM(s) Oral daily  nystatin    Suspension 411181 Unit(s) Swish and Swallow four times a day  pantoprazole    Tablet 40 milliGRAM(s) Oral before breakfast  polyethylene glycol 3350 17 Gram(s) Oral daily  predniSONE   Tablet 20 milliGRAM(s) Oral daily  senna 2 Tablet(s) Oral at bedtime  sodium chloride 7% Inhalation 4 milliLiter(s) Inhalation every 8 hours  tiotropium 18 MICROgram(s) Capsule 1 Capsule(s) Inhalation daily  tobramycin for Nebulization 300 milliGRAM(s) Inhalation every 12 hours  venlafaxine XR. 37.5 milliGRAM(s) Oral daily    MEDICATIONS  (PRN):  acetaminophen   Tablet .. 650 milliGRAM(s) Oral every 6 hours PRN Mild Pain (1 - 3)      Vital Signs Last 24 Hrs  T(C): 36.4 (12 Apr 2021 06:34), Max: 36.7 (11 Apr 2021 12:17)  T(F): 97.5 (12 Apr 2021 06:34), Max: 98 (11 Apr 2021 12:17)  HR: 88 (12 Apr 2021 10:38) (82 - 110)  BP: 145/77 (12 Apr 2021 06:34) (139/75 - 145/77)  RR: 18 (12 Apr 2021 06:34) (18 - 18)  SpO2: 96% (12 Apr 2021 06:34) (95% - 98%)  CAPILLARY BLOOD GLUCOSE      POCT Blood Glucose.: 139 mg/dL (12 Apr 2021 08:50)  POCT Blood Glucose.: 152 mg/dL (11 Apr 2021 22:25)  POCT Blood Glucose.: 203 mg/dL (11 Apr 2021 17:46)          PHYSICAL EXAM  GENERAL: NAD, thin, non-toxic appearing, on RA  CHEST/LUNG: Good air movement; Scattered low-pitched expiratory rhonchi.   HEART: Regular rate and rhythm; No murmurs, rubs, or gallops  ABDOMEN: Soft, Nontender, Nondistended; Bowel sounds present  EXTREMITIES:  2+ Peripheral Pulses, No clubbing, cyanosis, or edema  PSYCH: AAOx3      LABS:                        13.0   11.34 )-----------( 224      ( 12 Apr 2021 06:54 )             41.3     04-12    135  |  100  |  14  ----------------------------<  79  3.9   |  28  |  0.49<L>    Ca    9.7      12 Apr 2021 06:54  Phos  3.2     04-12  Mg     2.1     04-12        Consultant(s) Notes Reviewed:    Care Discussed with Consultants/Other Providers:

## 2021-04-12 NOTE — PROGRESS NOTE ADULT - ASSESSMENT
67 yo F PMH cystic bronchiectasis, probable PCD, ABPA, prior pseudomonas and Stenotrophomonas pna, HTN, and T2DM who presented for subacute worsening shortness of breath and fevers. Admitted for management of multifocal PNA (GGO seen on CT chest), now on Avycaz (day 5) for PSA growing, plan to complete a 10-14 days total.     Pneumonia/Cystic bronchiectasis 2/2 to PCD, Chronic pseudomass infection  - Continue with Avycaz currently on day 5 of IV antibiotic (plan to complete 10-14 day course given bronchiectasis)  - Continue with prednisone 20mg daily, continue with slow taper  - Airway clearance with chest PT, albuterol-ipratropium nebs q6h, hypertonic saline bid, and Aerobika q2h, chest vest if patient tolerates  - OOB, incentive spirometer  - Continue with Symbicort BID, fluticasone, montelukast, Duonebs Q8H (Spiriva on discharge)  - Continue with inhaled tobramycin   - Consider MBS for complaint of dysphagia  - NC@2LPM prn for goal SpO2>90%, although currently on room air without hypoxemia.  - Of note patient has history of ABPA but no documentation outpatient and with negative aspergillus ag.  - Given h/o eosinophilia, will consider starting mepolizumab therapy as outpatient.  - Consider prolonged outpatient inhaled tobramycin + azithromycin therapy 250 mg tiw for severe bronchiectasis with pseudomonas colonization  - Needs outpatient pulmonary follow up with Dr. Young upon discharge at [address] [place] [address], [address] ([phone]).  Please e-mail [email or web address] to make an appointment for the patient.  Please include the name, , and a phone number for you and the patient.    Ann Parks MD  Pulmonary & Critical Care Medicine Fellow | PGY-6     67 yo F PMH cystic bronchiectasis, probable PCD, ABPA, prior pseudomonas and Stenotrophomonas pna, HTN, and T2DM who presented for subacute worsening shortness of breath and fevers. Admitted for management of multifocal PNA (GGO seen on CT chest), now on Avycaz (day 5) for PSA growing, plan to complete a 10-14 days total.     Pneumonia/Cystic bronchiectasis 2/2 to PCD, Chronic pseudomass infection  - Continue with Avycaz currently on day 5 of IV antibiotic (plan to complete 10-14 day course given bronchiectasis)  - Continue with prednisone 20mg daily, continue with slow taper (to be done as an outpatient)  - Airway clearance with chest PT, albuterol-ipratropium nebs q6h, hypertonic saline bid, and Aerobika q2h, chest vest if patient tolerates  - OOB, incentive spirometer  - Continue with Symbicort BID, fluticasone, montelukast, Duonebs Q8H (Spiriva on discharge)  - Continue with inhaled tobramycin   - Consider MBS for complaint of dysphagia  - NC@2LPM prn for goal SpO2>90%, although currently on room air without hypoxemia.  - Of note patient has history of ABPA but no documentation outpatient and with negative aspergillus ag.  - Given h/o eosinophilia, will consider starting mepolizumab therapy as outpatient.  - Consider prolonged outpatient inhaled tobramycin + azithromycin therapy 250 mg tiw for severe bronchiectasis with pseudomonas colonization  - Needs outpatient pulmonary follow up with Dr. Young upon discharge at [address] [place] [address], [address] ([phone]).  Please e-mail [email or web address] to make an appointment for the patient.  Please include the name, , and a phone number for you and the patient.    Ann Parks MD  Pulmonary & Critical Care Medicine Fellow | PGY-6

## 2021-04-13 LAB
GLUCOSE BLDC GLUCOMTR-MCNC: 135 MG/DL — HIGH (ref 70–99)
GLUCOSE BLDC GLUCOMTR-MCNC: 138 MG/DL — HIGH (ref 70–99)
GLUCOSE BLDC GLUCOMTR-MCNC: 144 MG/DL — HIGH (ref 70–99)
GLUCOSE BLDC GLUCOMTR-MCNC: 181 MG/DL — HIGH (ref 70–99)
GLUCOSE BLDC GLUCOMTR-MCNC: 321 MG/DL — HIGH (ref 70–99)
SOLUBLE LIVER IGG SER IA-ACNC: 6 — SIGNIFICANT CHANGE UP (ref 0–20)

## 2021-04-13 PROCEDURE — 99233 SBSQ HOSP IP/OBS HIGH 50: CPT

## 2021-04-13 RX ADMIN — Medication 5 UNIT(S): at 12:46

## 2021-04-13 RX ADMIN — DIPHENHYDRAMINE HYDROCHLORIDE AND LIDOCAINE HYDROCHLORIDE AND ALUMINUM HYDROXIDE AND MAGNESIUM HYDRO 10 MILLILITER(S): KIT at 13:22

## 2021-04-13 RX ADMIN — PANTOPRAZOLE SODIUM 40 MILLIGRAM(S): 20 TABLET, DELAYED RELEASE ORAL at 06:12

## 2021-04-13 RX ADMIN — BUDESONIDE AND FORMOTEROL FUMARATE DIHYDRATE 2 PUFF(S): 160; 4.5 AEROSOL RESPIRATORY (INHALATION) at 08:46

## 2021-04-13 RX ADMIN — Medication 3 MILLILITER(S): at 23:13

## 2021-04-13 RX ADMIN — DIPHENHYDRAMINE HYDROCHLORIDE AND LIDOCAINE HYDROCHLORIDE AND ALUMINUM HYDROXIDE AND MAGNESIUM HYDRO 10 MILLILITER(S): KIT at 06:11

## 2021-04-13 RX ADMIN — SODIUM CHLORIDE 4 MILLILITER(S): 9 INJECTION INTRAMUSCULAR; INTRAVENOUS; SUBCUTANEOUS at 07:26

## 2021-04-13 RX ADMIN — Medication 5 UNIT(S): at 08:46

## 2021-04-13 RX ADMIN — Medication 1 SPRAY(S): at 13:22

## 2021-04-13 RX ADMIN — MONTELUKAST 10 MILLIGRAM(S): 4 TABLET, CHEWABLE ORAL at 13:21

## 2021-04-13 RX ADMIN — SODIUM CHLORIDE 4 MILLILITER(S): 9 INJECTION INTRAMUSCULAR; INTRAVENOUS; SUBCUTANEOUS at 15:36

## 2021-04-13 RX ADMIN — Medication 37.5 MILLIGRAM(S): at 13:21

## 2021-04-13 RX ADMIN — ENOXAPARIN SODIUM 40 MILLIGRAM(S): 100 INJECTION SUBCUTANEOUS at 13:21

## 2021-04-13 RX ADMIN — Medication 100 MILLIGRAM(S): at 06:11

## 2021-04-13 RX ADMIN — Medication 4: at 12:46

## 2021-04-13 RX ADMIN — BENZOCAINE AND MENTHOL 1 LOZENGE: 5; 1 LIQUID ORAL at 23:38

## 2021-04-13 RX ADMIN — Medication 5 UNIT(S): at 17:37

## 2021-04-13 RX ADMIN — BUDESONIDE AND FORMOTEROL FUMARATE DIHYDRATE 2 PUFF(S): 160; 4.5 AEROSOL RESPIRATORY (INHALATION) at 21:46

## 2021-04-13 RX ADMIN — Medication 20 MILLIGRAM(S): at 06:12

## 2021-04-13 RX ADMIN — BENZOCAINE AND MENTHOL 1 LOZENGE: 5; 1 LIQUID ORAL at 06:10

## 2021-04-13 RX ADMIN — Medication 100 MILLIGRAM(S): at 13:21

## 2021-04-13 RX ADMIN — DIPHENHYDRAMINE HYDROCHLORIDE AND LIDOCAINE HYDROCHLORIDE AND ALUMINUM HYDROXIDE AND MAGNESIUM HYDRO 10 MILLILITER(S): KIT at 21:46

## 2021-04-13 RX ADMIN — BENZOCAINE AND MENTHOL 1 LOZENGE: 5; 1 LIQUID ORAL at 17:38

## 2021-04-13 RX ADMIN — Medication 1 TABLET(S): at 13:21

## 2021-04-13 RX ADMIN — SENNA PLUS 2 TABLET(S): 8.6 TABLET ORAL at 21:46

## 2021-04-13 RX ADMIN — INSULIN GLARGINE 22 UNIT(S): 100 INJECTION, SOLUTION SUBCUTANEOUS at 21:46

## 2021-04-13 RX ADMIN — Medication 3 MILLILITER(S): at 07:20

## 2021-04-13 RX ADMIN — Medication 325 MILLIGRAM(S): at 13:21

## 2021-04-13 RX ADMIN — SODIUM CHLORIDE 4 MILLILITER(S): 9 INJECTION INTRAMUSCULAR; INTRAVENOUS; SUBCUTANEOUS at 23:13

## 2021-04-13 RX ADMIN — Medication 300 MILLIGRAM(S): at 07:25

## 2021-04-13 RX ADMIN — AMLODIPINE BESYLATE 5 MILLIGRAM(S): 2.5 TABLET ORAL at 06:10

## 2021-04-13 RX ADMIN — Medication 100 MILLIGRAM(S): at 21:51

## 2021-04-13 RX ADMIN — Medication 3 MILLILITER(S): at 15:36

## 2021-04-13 RX ADMIN — BENZOCAINE AND MENTHOL 1 LOZENGE: 5; 1 LIQUID ORAL at 13:21

## 2021-04-13 NOTE — PROGRESS NOTE ADULT - PROBLEM SELECTOR PLAN 4
Per pt, on Basaglar 10u qhs and Admelog 2u qAC at home --> Noted hypoglycemic event yesterday then hyperglycemia. Unclear if patient with adequate oral intake and then overcorrection with glucose administration  - Cont lantus 22 units, and premeal 5 units tid for now --> if another hypoglycemic episode, will decrease premeal insulin dose  - cont to monitor and adjust insulin prn

## 2021-04-13 NOTE — PROGRESS NOTE ADULT - PROBLEM SELECTOR PLAN 2
- Cont prednisone 20mg daily dosing as per pulm recs  - evaluated by ENT and no thrush noted -->no further ENT recs  - c/w pantoprazole 40mg for GI ppx while on steroids

## 2021-04-13 NOTE — PROGRESS NOTE ADULT - SUBJECTIVE AND OBJECTIVE BOX
Patient is a 66y old  Female who presents with a chief complaint of Pneumonia (12 Apr 2021 18:25)    SUBJECTIVE / OVERNIGHT EVENTS:  Patient feels okay. She intermittently gets anxious. No fever, chills, chest pain or SOB at rest. Pt does have dyspnea with exertion. No n/v or abdominal pain.     MEDICATIONS  (STANDING):  albuterol/ipratropium for Nebulization 3 milliLiter(s) Nebulizer every 8 hours  amLODIPine   Tablet 5 milliGRAM(s) Oral daily  benzocaine 15 mG/menthol 3.6 mG (Sugar-Free) Lozenge 1 Lozenge Oral every 6 hours  benzonatate 100 milliGRAM(s) Oral three times a day  budesonide 160 MICROgram(s)/formoterol 4.5 MICROgram(s) Inhaler 2 Puff(s) Inhalation two times a day  calcium carbonate 1250 mG  + Vitamin D (OsCal 500 + D) 1 Tablet(s) Oral daily  ceftazidime/avibactam IVPB 2.5 Gram(s) IV Intermittent every 8 hours  cyanocobalamin Injectable 1000 MICROGram(s) IntraMuscular <User Schedule>  dextrose 40% Gel 15 Gram(s) Oral once  dextrose 5%. 1000 milliLiter(s) (50 mL/Hr) IV Continuous <Continuous>  dextrose 5%. 1000 milliLiter(s) (100 mL/Hr) IV Continuous <Continuous>  dextrose 50% Injectable 25 Gram(s) IV Push once  dextrose 50% Injectable 12.5 Gram(s) IV Push once  dextrose 50% Injectable 25 Gram(s) IV Push once  dextrose 50% Injectable 25 Gram(s) IV Push once  enoxaparin Injectable 40 milliGRAM(s) SubCutaneous daily  ferrous    sulfate 325 milliGRAM(s) Oral daily  FIRST- Mouthwash  BLM 10 milliLiter(s) Swish and Spit every 8 hours  fluticasone propionate 50 MICROgram(s)/spray Nasal Spray 1 Spray(s) Both Nostrils <User Schedule>  glucagon  Injectable 1 milliGRAM(s) IntraMuscular once  influenza   Vaccine 0.5 milliLiter(s) IntraMuscular once  insulin glargine Injectable (LANTUS) 22 Unit(s) SubCutaneous at bedtime  insulin lispro (ADMELOG) corrective regimen sliding scale   SubCutaneous at bedtime  insulin lispro (ADMELOG) corrective regimen sliding scale   SubCutaneous three times a day before meals  insulin lispro Injectable (ADMELOG) 5 Unit(s) SubCutaneous three times a day before meals  montelukast 10 milliGRAM(s) Oral daily  pantoprazole    Tablet 40 milliGRAM(s) Oral before breakfast  polyethylene glycol 3350 17 Gram(s) Oral daily  predniSONE   Tablet 20 milliGRAM(s) Oral daily  senna 2 Tablet(s) Oral at bedtime  sodium chloride 7% Inhalation 4 milliLiter(s) Inhalation every 8 hours  tiotropium 18 MICROgram(s) Capsule 1 Capsule(s) Inhalation daily  tobramycin for Nebulization 300 milliGRAM(s) Inhalation every 12 hours  venlafaxine XR. 37.5 milliGRAM(s) Oral daily    MEDICATIONS  (PRN):  acetaminophen   Tablet .. 650 milliGRAM(s) Oral every 6 hours PRN Mild Pain (1 - 3)      Vital Signs Last 24 Hrs  T(C): 36.7 (13 Apr 2021 13:31), Max: 36.7 (12 Apr 2021 21:07)  T(F): 98 (13 Apr 2021 13:31), Max: 98 (12 Apr 2021 21:07)  HR: 81 (13 Apr 2021 15:36) (81 - 99)  BP: 133/75 (13 Apr 2021 13:31) (133/75 - 147/66)  BP(mean): --  RR: 19 (13 Apr 2021 13:31) (18 - 19)  SpO2: 96% (13 Apr 2021 15:36) (95% - 98%)  CAPILLARY BLOOD GLUCOSE      POCT Blood Glucose.: 135 mg/dL (13 Apr 2021 17:06)  POCT Blood Glucose.: 321 mg/dL (13 Apr 2021 12:05)  POCT Blood Glucose.: 144 mg/dL (13 Apr 2021 08:41)  POCT Blood Glucose.: 138 mg/dL (13 Apr 2021 07:01)  POCT Blood Glucose.: 232 mg/dL (12 Apr 2021 23:28)  POCT Blood Glucose.: 208 mg/dL (12 Apr 2021 21:58)  POCT Blood Glucose.: 119 mg/dL (12 Apr 2021 21:14)  POCT Blood Glucose.: 57 mg/dL (12 Apr 2021 20:57)  POCT Blood Glucose.: 58 mg/dL (12 Apr 2021 20:52)        PHYSICAL EXAM  GENERAL: NAD, thin, non-toxic appearing, on RA  CHEST/LUNG: Good air movement; Scattered low-pitched expiratory rhonchi.   HEART: Regular rate and rhythm; No murmurs, rubs, or gallops  ABDOMEN: Soft, Nontender, Nondistended; Bowel sounds present  EXTREMITIES:  2+ Peripheral Pulses, No clubbing, cyanosis, or edema  PSYCH: AAOx3      LABS:                        13.0   11.34 )-----------( 224      ( 12 Apr 2021 06:54 )             41.3     04-12    135  |  100  |  14  ----------------------------<  79  3.9   |  28  |  0.49<L>    Ca    9.7      12 Apr 2021 06:54  Phos  3.2     04-12  Mg     2.1     04-12              Consultant(s) Notes Reviewed:  yes  Care Discussed with Consultants/Other Providers:

## 2021-04-13 NOTE — PROGRESS NOTE ADULT - PROBLEM SELECTOR PLAN 1
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 Initially started on Zosyn but transitioned to Avycaz on 4/8/21. ID following, recs appreciated  - c/w Avycaz, currently on day 5 of 14 (abx course as recommended by pulm)  - c/w inhaled tobramycin 300mg q12; currently on day 13 (will discuss with ID and pulm regarding how long pt should be on medication)  - f/u ID recs  - BCx x2 NGTD.    - c/w nebulized budesonide, PRN albuterol nebs, and inhaled hypertonic saline.   - c/w Chest PT  - f/u pulm recs

## 2021-04-14 LAB
ANION GAP SERPL CALC-SCNC: 8 MMOL/L — SIGNIFICANT CHANGE UP (ref 7–14)
BUN SERPL-MCNC: 17 MG/DL — SIGNIFICANT CHANGE UP (ref 7–23)
CALCIUM SERPL-MCNC: 9.5 MG/DL — SIGNIFICANT CHANGE UP (ref 8.4–10.5)
CHLORIDE SERPL-SCNC: 101 MMOL/L — SIGNIFICANT CHANGE UP (ref 98–107)
CO2 SERPL-SCNC: 29 MMOL/L — SIGNIFICANT CHANGE UP (ref 22–31)
CREAT SERPL-MCNC: 0.5 MG/DL — SIGNIFICANT CHANGE UP (ref 0.5–1.3)
GLUCOSE BLDC GLUCOMTR-MCNC: 119 MG/DL — HIGH (ref 70–99)
GLUCOSE BLDC GLUCOMTR-MCNC: 153 MG/DL — HIGH (ref 70–99)
GLUCOSE BLDC GLUCOMTR-MCNC: 156 MG/DL — HIGH (ref 70–99)
GLUCOSE BLDC GLUCOMTR-MCNC: 189 MG/DL — HIGH (ref 70–99)
GLUCOSE BLDC GLUCOMTR-MCNC: 261 MG/DL — HIGH (ref 70–99)
GLUCOSE BLDC GLUCOMTR-MCNC: 66 MG/DL — LOW (ref 70–99)
GLUCOSE SERPL-MCNC: 54 MG/DL — CRITICAL LOW (ref 70–99)
HCT VFR BLD CALC: 39.7 % — SIGNIFICANT CHANGE UP (ref 34.5–45)
HEV AB FLD QL: NEGATIVE — SIGNIFICANT CHANGE UP
HGB BLD-MCNC: 12.4 G/DL — SIGNIFICANT CHANGE UP (ref 11.5–15.5)
MCHC RBC-ENTMCNC: 27.6 PG — SIGNIFICANT CHANGE UP (ref 27–34)
MCHC RBC-ENTMCNC: 31.2 GM/DL — LOW (ref 32–36)
MCV RBC AUTO: 88.2 FL — SIGNIFICANT CHANGE UP (ref 80–100)
NRBC # BLD: 0 /100 WBCS — SIGNIFICANT CHANGE UP
NRBC # FLD: 0 K/UL — SIGNIFICANT CHANGE UP
PLATELET # BLD AUTO: 234 K/UL — SIGNIFICANT CHANGE UP (ref 150–400)
POTASSIUM SERPL-MCNC: 3.9 MMOL/L — SIGNIFICANT CHANGE UP (ref 3.5–5.3)
POTASSIUM SERPL-SCNC: 3.9 MMOL/L — SIGNIFICANT CHANGE UP (ref 3.5–5.3)
RBC # BLD: 4.5 M/UL — SIGNIFICANT CHANGE UP (ref 3.8–5.2)
RBC # FLD: 14 % — SIGNIFICANT CHANGE UP (ref 10.3–14.5)
SODIUM SERPL-SCNC: 138 MMOL/L — SIGNIFICANT CHANGE UP (ref 135–145)
WBC # BLD: 11.03 K/UL — HIGH (ref 3.8–10.5)
WBC # FLD AUTO: 11.03 K/UL — HIGH (ref 3.8–10.5)

## 2021-04-14 PROCEDURE — 99233 SBSQ HOSP IP/OBS HIGH 50: CPT

## 2021-04-14 PROCEDURE — 99222 1ST HOSP IP/OBS MODERATE 55: CPT | Mod: GC

## 2021-04-14 PROCEDURE — 99232 SBSQ HOSP IP/OBS MODERATE 35: CPT | Mod: GC

## 2021-04-14 RX ORDER — INSULIN GLARGINE 100 [IU]/ML
18 INJECTION, SOLUTION SUBCUTANEOUS AT BEDTIME
Refills: 0 | Status: DISCONTINUED | OUTPATIENT
Start: 2021-04-14 | End: 2021-04-15

## 2021-04-14 RX ADMIN — Medication 5 UNIT(S): at 08:47

## 2021-04-14 RX ADMIN — Medication 3 MILLILITER(S): at 07:36

## 2021-04-14 RX ADMIN — Medication 3 MILLILITER(S): at 23:19

## 2021-04-14 RX ADMIN — SENNA PLUS 2 TABLET(S): 8.6 TABLET ORAL at 21:45

## 2021-04-14 RX ADMIN — PREGABALIN 1000 MICROGRAM(S): 225 CAPSULE ORAL at 13:12

## 2021-04-14 RX ADMIN — AMLODIPINE BESYLATE 5 MILLIGRAM(S): 2.5 TABLET ORAL at 05:36

## 2021-04-14 RX ADMIN — Medication 1 TABLET(S): at 13:11

## 2021-04-14 RX ADMIN — DIPHENHYDRAMINE HYDROCHLORIDE AND LIDOCAINE HYDROCHLORIDE AND ALUMINUM HYDROXIDE AND MAGNESIUM HYDRO 10 MILLILITER(S): KIT at 05:40

## 2021-04-14 RX ADMIN — ENOXAPARIN SODIUM 40 MILLIGRAM(S): 100 INJECTION SUBCUTANEOUS at 13:12

## 2021-04-14 RX ADMIN — Medication 100 MILLIGRAM(S): at 13:10

## 2021-04-14 RX ADMIN — Medication 100 MILLIGRAM(S): at 05:39

## 2021-04-14 RX ADMIN — MONTELUKAST 10 MILLIGRAM(S): 4 TABLET, CHEWABLE ORAL at 13:11

## 2021-04-14 RX ADMIN — DIPHENHYDRAMINE HYDROCHLORIDE AND LIDOCAINE HYDROCHLORIDE AND ALUMINUM HYDROXIDE AND MAGNESIUM HYDRO 10 MILLILITER(S): KIT at 21:45

## 2021-04-14 RX ADMIN — INSULIN GLARGINE 18 UNIT(S): 100 INJECTION, SOLUTION SUBCUTANEOUS at 21:45

## 2021-04-14 RX ADMIN — SODIUM CHLORIDE 4 MILLILITER(S): 9 INJECTION INTRAMUSCULAR; INTRAVENOUS; SUBCUTANEOUS at 14:32

## 2021-04-14 RX ADMIN — Medication 1: at 17:27

## 2021-04-14 RX ADMIN — Medication 100 MILLIGRAM(S): at 21:44

## 2021-04-14 RX ADMIN — BENZOCAINE AND MENTHOL 1 LOZENGE: 5; 1 LIQUID ORAL at 05:40

## 2021-04-14 RX ADMIN — PANTOPRAZOLE SODIUM 40 MILLIGRAM(S): 20 TABLET, DELAYED RELEASE ORAL at 05:37

## 2021-04-14 RX ADMIN — Medication 20 MILLIGRAM(S): at 05:37

## 2021-04-14 RX ADMIN — Medication 5 UNIT(S): at 13:10

## 2021-04-14 RX ADMIN — SODIUM CHLORIDE 4 MILLILITER(S): 9 INJECTION INTRAMUSCULAR; INTRAVENOUS; SUBCUTANEOUS at 07:41

## 2021-04-14 RX ADMIN — BUDESONIDE AND FORMOTEROL FUMARATE DIHYDRATE 2 PUFF(S): 160; 4.5 AEROSOL RESPIRATORY (INHALATION) at 21:45

## 2021-04-14 RX ADMIN — SODIUM CHLORIDE 4 MILLILITER(S): 9 INJECTION INTRAMUSCULAR; INTRAVENOUS; SUBCUTANEOUS at 23:30

## 2021-04-14 RX ADMIN — Medication 3 MILLILITER(S): at 14:32

## 2021-04-14 RX ADMIN — Medication 325 MILLIGRAM(S): at 13:13

## 2021-04-14 RX ADMIN — Medication 300 MILLIGRAM(S): at 07:41

## 2021-04-14 RX ADMIN — Medication 300 MILLIGRAM(S): at 23:46

## 2021-04-14 RX ADMIN — Medication 37.5 MILLIGRAM(S): at 13:11

## 2021-04-14 RX ADMIN — POLYETHYLENE GLYCOL 3350 17 GRAM(S): 17 POWDER, FOR SOLUTION ORAL at 13:11

## 2021-04-14 RX ADMIN — Medication 5 UNIT(S): at 17:28

## 2021-04-14 RX ADMIN — BUDESONIDE AND FORMOTEROL FUMARATE DIHYDRATE 2 PUFF(S): 160; 4.5 AEROSOL RESPIRATORY (INHALATION) at 08:47

## 2021-04-14 RX ADMIN — Medication 1 SPRAY(S): at 13:13

## 2021-04-14 RX ADMIN — DIPHENHYDRAMINE HYDROCHLORIDE AND LIDOCAINE HYDROCHLORIDE AND ALUMINUM HYDROXIDE AND MAGNESIUM HYDRO 10 MILLILITER(S): KIT at 13:13

## 2021-04-14 NOTE — PROGRESS NOTE ADULT - SUBJECTIVE AND OBJECTIVE BOX
Patient is a 66y old  Female who presents with a chief complaint of Pneumonia (14 Apr 2021 11:25)      SUBJECTIVE / OVERNIGHT EVENTS:  Patient complained of acid reflux and gas which has decreased her appetite. She has no abdominal pain, n/v and mentioned regular BM. NO fever, chills, chest pain or SOB. Pt with sore throat that she thinks is due to persistent coughing.     MEDICATIONS  (STANDING):  albuterol/ipratropium for Nebulization 3 milliLiter(s) Nebulizer every 8 hours  amLODIPine   Tablet 5 milliGRAM(s) Oral daily  benzocaine 15 mG/menthol 3.6 mG (Sugar-Free) Lozenge 1 Lozenge Oral every 6 hours  benzonatate 100 milliGRAM(s) Oral three times a day  budesonide 160 MICROgram(s)/formoterol 4.5 MICROgram(s) Inhaler 2 Puff(s) Inhalation two times a day  calcium carbonate 1250 mG  + Vitamin D (OsCal 500 + D) 1 Tablet(s) Oral daily  ceftazidime/avibactam IVPB 2.5 Gram(s) IV Intermittent every 8 hours  cyanocobalamin Injectable 1000 MICROGram(s) IntraMuscular <User Schedule>  dextrose 40% Gel 15 Gram(s) Oral once  dextrose 5%. 1000 milliLiter(s) (50 mL/Hr) IV Continuous <Continuous>  dextrose 5%. 1000 milliLiter(s) (100 mL/Hr) IV Continuous <Continuous>  dextrose 50% Injectable 25 Gram(s) IV Push once  dextrose 50% Injectable 12.5 Gram(s) IV Push once  dextrose 50% Injectable 25 Gram(s) IV Push once  dextrose 50% Injectable 25 Gram(s) IV Push once  enoxaparin Injectable 40 milliGRAM(s) SubCutaneous daily  ferrous    sulfate 325 milliGRAM(s) Oral daily  FIRST- Mouthwash  BLM 10 milliLiter(s) Swish and Spit every 8 hours  fluticasone propionate 50 MICROgram(s)/spray Nasal Spray 1 Spray(s) Both Nostrils <User Schedule>  glucagon  Injectable 1 milliGRAM(s) IntraMuscular once  guaiFENesin   Syrup  (Sugar-Free) 200 milliGRAM(s) Oral once  influenza   Vaccine 0.5 milliLiter(s) IntraMuscular once  insulin glargine Injectable (LANTUS) 18 Unit(s) SubCutaneous at bedtime  insulin lispro (ADMELOG) corrective regimen sliding scale   SubCutaneous three times a day before meals  insulin lispro (ADMELOG) corrective regimen sliding scale   SubCutaneous at bedtime  insulin lispro Injectable (ADMELOG) 5 Unit(s) SubCutaneous three times a day before meals  montelukast 10 milliGRAM(s) Oral daily  pantoprazole    Tablet 40 milliGRAM(s) Oral before breakfast  polyethylene glycol 3350 17 Gram(s) Oral daily  predniSONE   Tablet 20 milliGRAM(s) Oral daily  senna 2 Tablet(s) Oral at bedtime  sodium chloride 7% Inhalation 4 milliLiter(s) Inhalation every 8 hours  tiotropium 18 MICROgram(s) Capsule 1 Capsule(s) Inhalation daily  tobramycin for Nebulization 300 milliGRAM(s) Inhalation every 12 hours  venlafaxine XR. 37.5 milliGRAM(s) Oral daily    MEDICATIONS  (PRN):  acetaminophen   Tablet .. 650 milliGRAM(s) Oral every 6 hours PRN Mild Pain (1 - 3)  aluminum hydroxide/magnesium hydroxide/simethicone Suspension 30 milliLiter(s) Oral every 4 hours PRN Dyspepsia  benzocaine 20% Spray 1 Spray(s) Topical three times a day PRN sore throat      Vital Signs Last 24 Hrs  T(C): 36.6 (14 Apr 2021 21:42), Max: 36.9 (14 Apr 2021 12:07)  T(F): 97.8 (14 Apr 2021 21:42), Max: 98.4 (14 Apr 2021 12:07)  HR: 80 (14 Apr 2021 23:46) (80 - 90)  BP: 139/63 (14 Apr 2021 21:42) (138/60 - 140/60)  BP(mean): --  RR: 18 (14 Apr 2021 21:42) (18 - 18)  SpO2: 96% (14 Apr 2021 23:46) (96% - 99%)  CAPILLARY BLOOD GLUCOSE      POCT Blood Glucose.: 119 mg/dL (14 Apr 2021 21:26)  POCT Blood Glucose.: 189 mg/dL (14 Apr 2021 16:59)  POCT Blood Glucose.: 261 mg/dL (14 Apr 2021 12:06)  POCT Blood Glucose.: 156 mg/dL (14 Apr 2021 08:05)  POCT Blood Glucose.: 153 mg/dL (14 Apr 2021 07:38)  POCT Blood Glucose.: 66 mg/dL (14 Apr 2021 07:09)          PHYSICAL EXAM  GENERAL: NAD, thin, non-toxic appearing, on RA  CHEST/LUNG: Good air movement; Scattered low-pitched expiratory rhonchi.   HEART: Regular rate and rhythm; No murmurs, rubs, or gallops  ABDOMEN: Soft, Nontender, Nondistended; Bowel sounds present  EXTREMITIES:  2+ Peripheral Pulses, No clubbing, cyanosis, or edema  PSYCH: AAOx3        LABS:                        12.4   11.03 )-----------( 234      ( 14 Apr 2021 06:27 )             39.7     04-14    138  |  101  |  17  ----------------------------<  54<LL>  3.9   |  29  |  0.50    Ca    9.5      14 Apr 2021 06:27                Consultant(s) Notes Reviewed:    Care Discussed with Consultants/Other Providers:

## 2021-04-14 NOTE — PHYSICAL THERAPY INITIAL EVALUATION ADULT - ADDITIONAL COMMENTS
As per daughter, pt lives in an apartment house on the first floor with her , son, daughter in law and 2 grandchildren with ~6-7 steps to enter; (+)1 handrail. Prior to hospital admission pt was ambulating with assistance using a single axis cane. Pt also owns a rolling walker if she needs. Pt has had no recent falls but has anxiety/fearful that she will fall.    Pt left comfortable in bed, NAD, all lines intact, all precautions maintained, with call bell in reach, bed alarm on, and RN aware of PT evaluation.

## 2021-04-14 NOTE — CONSULT NOTE ADULT - ASSESSMENT
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    # Abdominal bloating: May represent lactose intolerance, SIBO, functional dyspepsia  # Throat pain: May be secondary to uncontrolled GERD. No candida or other lesions noted on laryngoscopy    Recommendations:  - Continue PPI PO daily  - Simethicone PRN  - Lactose free diet  - Can consider outpatient EGD following treatment of pneumonia (Call [phone] to make appointment)  - Rest of care per primary team    Cem Lopez MD  Gastroenterology Fellow  Please contact via [employer] Teams  Pager number:   [phone] (Long range pager)  [phone] ([facility] pager)  Pageable via [place] at [facility] and [facility]. Select Tools --> On Call [facility]-ED --> Search for name    Please page GI (Pager: [phone]) if there are any additional questions or concerns. Please call on-call GI fellow after 5pm and before 8am, and on weekends.   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    # Abdominal bloating: May represent lactose intolerance, SIBO, functional dyspepsia  # Odynophagia: May be secondary to uncontrolled GERD. No candida or other lesions noted on laryngoscopy  # New diagnosis of cirrhosis: Hepatology following    Recommendations:  - Can obtain barium esophagram to rule out structural abnormalities  - Can consider outpatient EGD following treatment of pneumonia (Call [phone] to make appointment)  - Continue PPI PO daily  - Simethicone PRN  - Lactose free diet  - Rest of care per primary team    Cem Lopez MD  Gastroenterology Fellow  Please contact via [employer] Teams  Pager number:   [phone] (Long range pager)  [phone] (J pager)  Pageable via [place] at [facility] and [facility]. Select Tools --> On Call [facility]-ED --> Search for name    Please page GI (Pager: [phone]) if there are any additional questions or concerns. Please call on-call GI fellow after 5pm and before 8am, and on weekends.

## 2021-04-14 NOTE — PROGRESS NOTE ADULT - SUBJECTIVE AND OBJECTIVE BOX
Chief Complaint:  Patient is a 66y old  Female who presents with a chief complaint of Pneumonia (13 Apr 2021 17:56)      Interval Events: Pt endorses pain when swallowing small bites of food; That has been occurring for the past 14 days. Pt states that she has an episode where she felt that food was stuck in her throat, she points towards her throat area. Pt had no prior EGD in the past. Pt also endorses bloating, 'too gassy', states she's constipated but had 2 BMs yesterday. No nausea, vomiting, weight loss, melena, hematochezia.   ROS: All 12 point system except listed above were otherwise negative.    Allergies:  No Known Allergies        Hospital Medications:  acetaminophen   Tablet .. 650 milliGRAM(s) Oral every 6 hours PRN  albuterol/ipratropium for Nebulization 3 milliLiter(s) Nebulizer every 8 hours  amLODIPine   Tablet 5 milliGRAM(s) Oral daily  benzocaine 15 mG/menthol 3.6 mG (Sugar-Free) Lozenge 1 Lozenge Oral every 6 hours  benzonatate 100 milliGRAM(s) Oral three times a day  budesonide 160 MICROgram(s)/formoterol 4.5 MICROgram(s) Inhaler 2 Puff(s) Inhalation two times a day  calcium carbonate 1250 mG  + Vitamin D (OsCal 500 + D) 1 Tablet(s) Oral daily  ceftazidime/avibactam IVPB 2.5 Gram(s) IV Intermittent every 8 hours  cyanocobalamin Injectable 1000 MICROGram(s) IntraMuscular <User Schedule>  dextrose 40% Gel 15 Gram(s) Oral once  dextrose 5%. 1000 milliLiter(s) IV Continuous <Continuous>  dextrose 5%. 1000 milliLiter(s) IV Continuous <Continuous>  dextrose 50% Injectable 25 Gram(s) IV Push once  dextrose 50% Injectable 12.5 Gram(s) IV Push once  dextrose 50% Injectable 25 Gram(s) IV Push once  dextrose 50% Injectable 25 Gram(s) IV Push once  enoxaparin Injectable 40 milliGRAM(s) SubCutaneous daily  ferrous    sulfate 325 milliGRAM(s) Oral daily  FIRST- Mouthwash  BLM 10 milliLiter(s) Swish and Spit every 8 hours  fluticasone propionate 50 MICROgram(s)/spray Nasal Spray 1 Spray(s) Both Nostrils <User Schedule>  glucagon  Injectable 1 milliGRAM(s) IntraMuscular once  influenza   Vaccine 0.5 milliLiter(s) IntraMuscular once  insulin glargine Injectable (LANTUS) 18 Unit(s) SubCutaneous at bedtime  insulin lispro (ADMELOG) corrective regimen sliding scale   SubCutaneous at bedtime  insulin lispro (ADMELOG) corrective regimen sliding scale   SubCutaneous three times a day before meals  insulin lispro Injectable (ADMELOG) 5 Unit(s) SubCutaneous three times a day before meals  montelukast 10 milliGRAM(s) Oral daily  pantoprazole    Tablet 40 milliGRAM(s) Oral before breakfast  polyethylene glycol 3350 17 Gram(s) Oral daily  predniSONE   Tablet 20 milliGRAM(s) Oral daily  senna 2 Tablet(s) Oral at bedtime  sodium chloride 7% Inhalation 4 milliLiter(s) Inhalation every 8 hours  tiotropium 18 MICROgram(s) Capsule 1 Capsule(s) Inhalation daily  tobramycin for Nebulization 300 milliGRAM(s) Inhalation every 12 hours  venlafaxine XR. 37.5 milliGRAM(s) Oral daily      PMHX/PSHX:  DM (diabetes mellitus)    Bronchitis    ABPA (allergic bronchopulmonary aspergillosis)    Bronchiectasis    Asthma    Hypertension    Vitamin D deficiency    Microcytic anemia    GERD (gastroesophageal reflux disease)    Postnasal drip    Pseudomonas aeruginosa colonization    Allergic rhinitis    Recurrent pneumonia    Type 2 diabetes mellitus, with long-term current use of insulin    History of cholecystectomy        Family history:  No pertinent family history in first degree relatives    Family history of diabetes mellitus    Family history of allergic rhinitis (Child, Child)    Family history of bronchitis      There is no family history of peptic ulcer disease, gastric cancer, colon polyps, colon cancer, celiac disease, biliary, hepatic, or pancreatic disease.  None of the female relatives have breast, uterine, or ovarian cancer.     PHYSICAL EXAM:   Vital Signs:  Vital Signs Last 24 Hrs  T(C): 36.8 (14 Apr 2021 05:34), Max: 36.8 (14 Apr 2021 05:34)  T(F): 98.3 (14 Apr 2021 05:34), Max: 98.3 (14 Apr 2021 05:34)  HR: 84 (14 Apr 2021 07:38) (81 - 99)  BP: 138/60 (14 Apr 2021 05:34) (133/75 - 138/60)  BP(mean): --  RR: 18 (14 Apr 2021 05:34) (18 - 19)  SpO2: 96% (14 Apr 2021 07:38) (95% - 99%)  Daily     Daily     ROS:     General:  No wt loss, fevers, chills, night sweats, fatigue,   Eyes:  Good vision, no reported pain  ENT:  No sore throat, pain, runny nose, dysphagia  CV:  No pain, palpitations, hypo/hypertension  Pulm:  No dyspnea, cough, tachypnea, wheezing  GI: see above  :  No pain, bleeding, incontinence, nocturia  Muscle:  No pain, weakness  Neuro:  No weakness, tingling, memory problems  Psych:  No fatigue, insomnia, mood problems, depression  Endocrine:  No polyuria, polydipsia, cold/heat intolerance  Heme:  No petechiae, ecchymosis, easy bruisability  Skin:  No rash, tattoos, scars, edema    GENERAL:  No acute distress  HEENT:  Normocephalic/atraumtic,  no scleral icterus  CHEST:  Clear to auscultation bilaterally, no wheezes/rales/ronchi, no accessory muscle use  HEART:  Regular rate and rhythm, no murmurs/rubs/gallops  ABDOMEN:  Soft, non-tender, non-distended, normoactive bowel sounds, no masses, no hepato-splenomegaly, no signs of chronic liver disease  EXTREMITIES:  No cyanosis, clubbing, or edema  SKIN:  No rash/erythema/ecchymoses/petechiae/wounds/abscess/warm/dry  NEURO:  Alert and oriented x 3, no asterixis, no tremor    LABS:                        12.4   11.03 )-----------( 234      ( 14 Apr 2021 06:27 )             39.7     Mean Cell Volume: 88.2 fL (04-14-21 @ 06:27)    04-14    138  |  101  |  17  ----------------------------<  54<LL>  3.9   |  29  |  0.50    Ca    9.5      14 Apr 2021 06:27                                    12.4   11.03 )-----------( 234      ( 14 Apr 2021 06:27 )             39.7                         13.0   11.34 )-----------( 224      ( 12 Apr 2021 06:54 )             41.3     Imaging:

## 2021-04-14 NOTE — PHYSICAL THERAPY INITIAL EVALUATION ADULT - PERTINENT HX OF CURRENT PROBLEM, REHAB EVAL
66F h/o diffuse cystitic bronchiectasis, primary ciliary dyskinesia, allergic bronchopulmonary aspergillosis, pseudomonas pneumonia, HTN, DM2 presents with two weeks of worsening shortness of breath and two days of fevers, admitted for sepsis due to pneumonia, possibly due to Pseudomonas resistant to fluoroquinolones.

## 2021-04-14 NOTE — PROGRESS NOTE ADULT - ASSESSMENT
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  CLD work up: HBsAG neg, HbcIgM neg, IgG, IgA, IgM wnl, RUPESH, anti-LKM negative, Hepatitis A, B serologies negative, Hep C RNA negative, ferritin 169. AFP wnl, ASMA 1:40     # Odynophagia- New since being in the hospital. Also complaint of x1 episode of sensation of food being stuck in the throat. Diff dx includes infectious esophagitis, pill-induced, reflux disease, apthous ulcers    Rec:  - Will consult general GI regarding odynophagia (ie EGD vs outpatient follow up)  - Monitor CBC, INR, CMP  - Patient will need outpatient follow up for cirrhosis management (Abdominal US, variceal screening, labs). Please call [phone] to schedule.  - Recommend simethicone as needed for bloating, low FODMAP diet (lactose free diet while being in the hospital, outpatient follow up for further management  - May use IB guard  - Miralax if constipated  - Will sign off; Please call back with questions    Tasha Ariza, PGY6  Gastroenterology Fellow  Pager # [phone] / [phone]  Can be contacted via [employer] Teams    For nights/weekends/holidays, contact on-call GI fellow via answering service ([phone])

## 2021-04-14 NOTE — PHYSICAL THERAPY INITIAL EVALUATION ADULT - PATIENT PROFILE REVIEW, REHAB EVAL
No Formal Activity Order in the Computer;spoke with RN Staci Curtis prior to PT evaluation--> Pt OK for PT consult/OOB activity./yes

## 2021-04-14 NOTE — PHYSICAL THERAPY INITIAL EVALUATION ADULT - DISCHARGE DISPOSITION, PT EVAL
to optimize safety and return to prior level of function./home w/ home PT to improve strength and balance for return to prior level of function./rehabilitation facility

## 2021-04-14 NOTE — PROGRESS NOTE ADULT - PROBLEM SELECTOR PLAN 1
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 Initially started on Zosyn but transitioned to Avycaz on 4/8/21. ID following, recs appreciated  - c/w Avycaz, currently on day 6 of 14 (abx course as recommended by pulm)  - c/w inhaled tobramycin 300mg q12; currently on day 14 (will discuss with ID and pulm regarding how long pt should be on medication)  - f/u ID recs  - BCx x2 NGTD.    - c/w nebulized budesonide, PRN albuterol nebs, and inhaled hypertonic saline.   - c/w Chest PT  - f/u pulm recs

## 2021-04-14 NOTE — CONSULT NOTE ADULT - ATTENDING COMMENTS
Acute hypoxic respiratory failure  Acute bronchiectasis exacerbation  Secondary to KELLY pneumonia  Cont Zosyn, please check sputum cultures  Follow up blood cultures  Cont BID prednisone  Duonebs q6 ATC and provide patient with aerobika to help with airway clearance  Rest of plan as above
Currently admitted with CRE pseudomonas PNA.   Patient with cough, increased sputum production.   Notes sore throat beginning 2 days prior to admission.   Also notes heartburn, epigastric burning and bloating for at least 1 month.   Has chronic constipation for which she needs stool softeners.   Suspect sore throat is likely in setting of patient's cough, but may be exacerbated by underlying reflux.   Given ongoing cough and sputum production, would defer non-emergent endoscopic procedure.   Would continue supportive care with lozenges, topical lidocaine slurry PRN, PPI, simethicone and bowel regimen.   Can consider esophagram while inpatient for less invasive esophageal evaluation. And, pending course/symptoms, can likely plan for EGD as outpatient.   Patient should follow up in GI/Hepatology fellows clinic. Please have patient follow up at [facility] [place]. Please call [phone] to schedule an appointment.
Patient with history of prior biliary surgery in [place] and has HCV antibody but HCV RNA negative in past. suggest repeat HCV RNA to confirm absence of virus. Suggest MRI/MRCP to assess liver morphology and biliary tree with chronic elevation of alk phos. Patient at risk of fatty liver ( has DM and on prednisone) and may also have residual biliary injury post surgery.

## 2021-04-14 NOTE — PROGRESS NOTE ADULT - PROBLEM SELECTOR PLAN 4
Per pt, on Basaglar 10u qhs and Admelog 2u qAC at home --> Noted hypoglycemic events likely due to decreased oral intake due to indigestion and acid reflux  - decrease to lantus 18 units, and premeal 5 units tid for now   - cont to monitor and adjust insulin prn

## 2021-04-14 NOTE — CONSULT NOTE ADULT - SUBJECTIVE AND OBJECTIVE BOX
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    The patient endorses abdominal bloating while in the hospital and reports significant belching and flatulence which is making her feel uncomfortable. She has been eating more vegetables in the hospital - she usually eats less at home.     She additional reports throat pain and feels some pain in her throat when swallowing, however, denies difficulty swallowing. She has chronic heartburn, however. She is currently taking PPI PO daily.    Allergies:  No Known Allergies    Home Medications:  Admelog SoloStar 100 units/mL injectable solution: 2 unit(s) injectable 3 times a day (before meals)  albuterol 2.5 mg/3 mL (0.083%) inhalation solution: 3 milliliter(s) inhaled every 6 hours, As Needed  albuterol 90 mcg/inh inhalation aerosol: 1 to 2 puff(s) inhaled every 4 to 6 hours, As Needed  amLODIPine 5 mg oral tablet: 1 tab(s) orally once a day  Basaglar KwikPen 100 units/mL subcutaneous solution: 10 unit(s) subcutaneous once a day (at bedtime)  benzonatate 100 mg oral capsule: 1 cap(s) orally 3 times a day  bisacodyl 5 mg oral delayed release tablet: 1 tab(s) orally 2 times a day, As Needed  budesonide 0.5 mg/2 mL inhalation suspension: 2 milliliter(s) inhaled every 12 hours  Calcium 600+D oral tablet: 1 tab(s) orally 2 times a day  cyanocobalamin 1000 mcg/mL injectable solution: 1000 microgram(s) injectable every 7 days on Wednesday  ferrous gluconate 324 mg (37.5 mg elemental iron) oral tablet: 1 tab(s) orally 2 times a day  Flonase 50 mcg/inh nasal spray: 1 spray(s) in each nostril once a day  levoFLOXacin 500 mg oral tablet: 1 tab(s) orally every 24 hoursx7 days  metFORMIN 1000 mg oral tablet: 1 tab(s) orally 2 times a day  montelukast 10 mg oral tablet: 1 tab(s) orally once a day  NebuSal: 4 milliliter(s) inhaled 2 times a day  omeprazole 20 mg oral delayed release capsule: 1 cap(s) orally once a day  Perforomist 20 mcg/2 mL inhalation solution: 2 milliliter(s) inhaled every 12 hours  predniSONE 10 mg oral tablet: 1 tab(s) orally 2 times a day  Senna 8.6 mg oral tablet: 1 tab(s) orally 2 times a day  tobramycin 75 mg/mL inhalation solution: 4 milliliter(s) inhaled 2 times a day  venlafaxine 37.5 mg oral capsule, extended release: 1 cap(s) orally once a day  Vitamin C 500 mg oral tablet: 1 tab(s) orally once a day  Vitamin D2 50,000 intl units (1.25 mg) oral capsule: 1 cap(s) orally once a week    Hospital Medications:  acetaminophen   Tablet .. 650 milliGRAM(s) Oral every 6 hours PRN  albuterol/ipratropium for Nebulization 3 milliLiter(s) Nebulizer every 8 hours  amLODIPine   Tablet 5 milliGRAM(s) Oral daily  benzocaine 15 mG/menthol 3.6 mG (Sugar-Free) Lozenge 1 Lozenge Oral every 6 hours  benzonatate 100 milliGRAM(s) Oral three times a day  budesonide 160 MICROgram(s)/formoterol 4.5 MICROgram(s) Inhaler 2 Puff(s) Inhalation two times a day  calcium carbonate 1250 mG  + Vitamin D (OsCal 500 + D) 1 Tablet(s) Oral daily  ceftazidime/avibactam IVPB 2.5 Gram(s) IV Intermittent every 8 hours  cyanocobalamin Injectable 1000 MICROGram(s) IntraMuscular <User Schedule>  dextrose 40% Gel 15 Gram(s) Oral once  dextrose 5%. 1000 milliLiter(s) IV Continuous <Continuous>  dextrose 5%. 1000 milliLiter(s) IV Continuous <Continuous>  dextrose 50% Injectable 25 Gram(s) IV Push once  dextrose 50% Injectable 12.5 Gram(s) IV Push once  dextrose 50% Injectable 25 Gram(s) IV Push once  dextrose 50% Injectable 25 Gram(s) IV Push once  enoxaparin Injectable 40 milliGRAM(s) SubCutaneous daily  ferrous    sulfate 325 milliGRAM(s) Oral daily  FIRST- Mouthwash  BLM 10 milliLiter(s) Swish and Spit every 8 hours  fluticasone propionate 50 MICROgram(s)/spray Nasal Spray 1 Spray(s) Both Nostrils <User Schedule>  glucagon  Injectable 1 milliGRAM(s) IntraMuscular once  influenza   Vaccine 0.5 milliLiter(s) IntraMuscular once  insulin glargine Injectable (LANTUS) 18 Unit(s) SubCutaneous at bedtime  insulin lispro (ADMELOG) corrective regimen sliding scale   SubCutaneous at bedtime  insulin lispro (ADMELOG) corrective regimen sliding scale   SubCutaneous three times a day before meals  insulin lispro Injectable (ADMELOG) 5 Unit(s) SubCutaneous three times a day before meals  montelukast 10 milliGRAM(s) Oral daily  pantoprazole    Tablet 40 milliGRAM(s) Oral before breakfast  polyethylene glycol 3350 17 Gram(s) Oral daily  predniSONE   Tablet 20 milliGRAM(s) Oral daily  senna 2 Tablet(s) Oral at bedtime  sodium chloride 7% Inhalation 4 milliLiter(s) Inhalation every 8 hours  tiotropium 18 MICROgram(s) Capsule 1 Capsule(s) Inhalation daily  tobramycin for Nebulization 300 milliGRAM(s) Inhalation every 12 hours  venlafaxine XR. 37.5 milliGRAM(s) Oral daily    Allergies:   No Known Allergies    PMHX/PSHX:  DM (diabetes mellitus)    Bronchitis    ABPA (allergic bronchopulmonary aspergillosis)    Bronchiectasis    Asthma    Hypertension    Vitamin D deficiency    Microcytic anemia    GERD (gastroesophageal reflux disease)    Postnasal drip    Pseudomonas aeruginosa colonization    Allergic rhinitis    Recurrent pneumonia    Type 2 diabetes mellitus, with long-term current use of insulin    History of cholecystectomy    Family history:  No pertinent family history in first degree relatives    Family history of diabetes mellitus    Family history of allergic rhinitis (Child, Child)    Family history of bronchitis    Denies family history of colon cancer/polyps, stomach cancer/polyps, pancreatic cancer/masses, liver cancer/disease, ovarian cancer and endometrial cancer.    Social History:     Tob: Denies  EtOH: Denies  Illicit Drugs: Denies    ROS:     General:  No wt loss, fevers, chills, night sweats, fatigue  Eyes:  Good vision, no reported pain  ENT:  No sore throat, pain, runny nose, dysphagia  CV:  No pain, palpitations, hypo/hypertension  Pulm:  No dyspnea, cough, tachypnea, wheezing  GI:  No pain, No nausea, No vomiting, No diarrhea, No constipation, No weight loss, No fever, No pruritis, No bright red blood per rectum, No tarry stools, No dysphagia  :  No pain, bleeding, incontinence, nocturia  Muscle:  No pain, weakness  Neuro:  No weakness, tingling, memory problems  Psych:  No fatigue, insomnia, mood problems, depression  Endocrine:  No polyuria, polydipsia, cold/heat intolerance  Heme:  No petechiae, ecchymosis, easy bruisability  Skin:  No rash, tattoos, scars, edema    PHYSICAL EXAM:     Vital Signs:  Vital Signs Last 24 Hrs  T(C): 36.8 (14 Apr 2021 05:34), Max: 36.8 (14 Apr 2021 05:34)  T(F): 98.3 (14 Apr 2021 05:34), Max: 98.3 (14 Apr 2021 05:34)  HR: 84 (14 Apr 2021 07:38) (81 - 99)  BP: 138/60 (14 Apr 2021 05:34) (133/75 - 138/60)  BP(mean): --  RR: 18 (14 Apr 2021 05:34) (18 - 19)  SpO2: 96% (14 Apr 2021 07:38) (95% - 99%)    GENERAL:  No acute distress  HEENT:  Normocephalic/atraumatic, no scleral icterus  CHEST:  Normal effort, no accessory muscle use  HEART:  Regular rate and rhythm  ABDOMEN:  Soft, non-tender, non-distended  EXTREMITIES: No cyanosis, clubbing, or edema  SKIN:  No rash/erythema  NEURO:  Alert and oriented x 3    LABS:                        12.4   11.03 )-----------( 234      ( 14 Apr 2021 06:27 )             39.7     Mean Cell Volume: 88.2 fL (04-14-21 @ 06:27)    04-14    138  |  101  |  17  ----------------------------<  54<LL>  3.9   |  29  |  0.50    Ca    9.5      14 Apr 2021 06:27               12.4   11.03 )-----------( 234      ( 14 Apr 2021 06:27 )             39.7                         13.0   11.34 )-----------( 224      ( 12 Apr 2021 06:54 )             41.3     Imaging:    Reviewed

## 2021-04-14 NOTE — PROGRESS NOTE ADULT - ATTENDING COMMENTS
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 CT chest also with stable bilateral bronchiectasis, bronchial wall thickening, tree-in-bud opacities, and mosaic attenuation.    Continue with empiric Zosyn and Tobramycin 300 mg bid. Sputum culture with rare pseudomonas aeruginosa. Airway clearance therapy with chest PT, albuterol-ipratropium nebs q6h, hypertonic saline bid, and Aerobika q2h. Consider chest vest. Continue prednisone 20 mg bid for significant expiratory wheezing bilaterally with markedly prolonged expiratory phase, somewhat improved today. Would change budesonide to Symbicort 160-4.5 mcg 2 puffs bid. Continue montelukast. Supplemental oxygen with NC@2LPM, goal SpO2>90%. Speech and swallow eval for reported dysphagia, no oral thrush. Please start cepacol lozenges or miracle mouthwash for odynophyagia.
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   Although anti-HCV positive x2 , HCV RNA negative. never treated for HCV, possible false positive and less likely previous exposure with spontaneous clearance , No HE, no ascites, no liver lesion, with low MELD-Na. Patient complains of odynophagia, and abdominal bloating.  No oropharyngeal candidiasis seen.   Workup for chronic liver disease so far unrevealing.   Would recommend- daily MELD-Na, GI evaluation for GI complaints, outpatient followup for GEV screening and continue HCC screening in 6 months, avoid hepatotoxins and continue care per primary team.
Acute hypoxic respiratory failure  Acute bronchiectasis exacerbation  Clinically patient appears impoved  Wean FiO2 as tolerated, goal SaO2>92%  CT chest with KELLY pneumonia  Cont Zosyn, follow up sputum cultures  BCxs NGTD  Cont BID prednisone  Duonebs q6 ATC and provide patient with aerobika to help with airway clearance  Rest of plan as above
Patient with chronic aspergillosis on prednisone and found to have HCV antibody but no evidence of active viral infection. Ultrasound showed nodular liver and chronic liver disease evaluation pending with Abdominal MRI.
Patient with probably PCD bronchiectasis with chronic PA. doing better on IV avycaz. Pt is on day 5 of IV antibiotic. Can complete 10-14 days total. continue inhaled tobra. pt at home O2 needs.
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 CT chest also with stable bilateral bronchiectasis, bronchial wall thickening, tree-in-bud opacities, and mosaic attenuation.    Continue with empiric Zosyn and Tobramycin 300 mg bid. Follow-up sputum culture. Airway clearance therapy with Aerobika and Chest PT. Start hypertonic saline. Continue prednisone 20 mg bid for significant expiratory wheezing bilaterally with markedly prolonged expiratory phase. Continue albuterol-ipratropium, budesonide, and montelukast. Consider chest vest. Supplemental oxygen with NC@2LPM, goal SpO2>90%. Speech and swallow eval for reported dysphagia, no oral thrush.
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 evaluation HCV Ab+ and new diagnosis of  liver nodularity concerning for cirrhosis on MRI.   No HE, no ascites, no liver lesion, MELD-NA 11  Would recommend complete workup for chronic liver disease as recommended, daily MELD-Na, outpatient followup for GEV screening and continue HCC screening in 6 months, avoid hepatotoxins and continue care per primary team.
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 CT chest also with stable bilateral bronchiectasis, bronchial wall thickening, tree-in-bud opacities, and mosaic attenuation. Sputum culture growing carbapenem-resistant Pseudomonas.    Discussed with micro lab, pseudomonas is sensitive to Avycaz & Zerbaxa. Agree with changing to Avycax. Continue inhaled tobramycin twice daily. Airway clearance therapy with chest PT, albuterol-ipratropium nebs q6h, hypertonic saline bid, and Aerobika q2h. Chest vest if tolerates. Prednisone decreased to 20 mg daily yesterday, continues to experience significant expiratory wheezing bilaterally. Given h/o eosinophilia, will consider starting mepolizumab therapy as outpatient. Continue Symbicort 160-4.5 mcg 2 puffs bid + montelukast. Supplemental oxygen with NC@2LPM prn for goal SpO2>90%, although currently on room air without hypoxemia. Consider prolonged outpatient inhaled tobramycin + azithromycin therapy 250 mg tiw for severe bronchiectasis with pseudomonas colonization. Challenging case!
Patient seen and examined on 4/1/2021, case discussed with Dr. Loco Granda. Patient speaks Leandro and Parker, provider speaks Parker. This is a 66F with history as above who presents to the hospital with complaints of a worsening cough and SOB for the past few weeks and fevers for the past few days. Patient reports a history of a chronic cough and reliance on supplemental O2 but over the past few weeks said that her breathing and cough both got substantially worse. Reports increased sputum production (mostly yellow, did have some hemoptysis a few days prior to admission) and fevers (reports temps to 99s at home). Also with dysuria and decreased PO intake. No other complaints.   - Imaging here concerning for new areas of PNA, given her history of Pseudomonas concern for pseudomonas PNA, would therefore place patient on zosyn, c/w above inhaler therapy management, pulm eval in AM, suspect her mild hemoptysis a few days prior to admission is related to exacerbation of her chronic disease, CT Chest with new PNA and otherwise chronic findings, no other acute findings  - Other management as above.

## 2021-04-15 LAB
ANION GAP SERPL CALC-SCNC: 8 MMOL/L — SIGNIFICANT CHANGE UP (ref 7–14)
BUN SERPL-MCNC: 15 MG/DL — SIGNIFICANT CHANGE UP (ref 7–23)
CALCIUM SERPL-MCNC: 9.2 MG/DL — SIGNIFICANT CHANGE UP (ref 8.4–10.5)
CHLORIDE SERPL-SCNC: 99 MMOL/L — SIGNIFICANT CHANGE UP (ref 98–107)
CO2 SERPL-SCNC: 28 MMOL/L — SIGNIFICANT CHANGE UP (ref 22–31)
CREAT SERPL-MCNC: 0.42 MG/DL — LOW (ref 0.5–1.3)
GLUCOSE BLDC GLUCOMTR-MCNC: 133 MG/DL — HIGH (ref 70–99)
GLUCOSE BLDC GLUCOMTR-MCNC: 140 MG/DL — HIGH (ref 70–99)
GLUCOSE BLDC GLUCOMTR-MCNC: 154 MG/DL — HIGH (ref 70–99)
GLUCOSE BLDC GLUCOMTR-MCNC: 162 MG/DL — HIGH (ref 70–99)
GLUCOSE BLDC GLUCOMTR-MCNC: 230 MG/DL — HIGH (ref 70–99)
GLUCOSE BLDC GLUCOMTR-MCNC: 295 MG/DL — HIGH (ref 70–99)
GLUCOSE BLDC GLUCOMTR-MCNC: 69 MG/DL — LOW (ref 70–99)
GLUCOSE BLDC GLUCOMTR-MCNC: 84 MG/DL — SIGNIFICANT CHANGE UP (ref 70–99)
GLUCOSE SERPL-MCNC: 157 MG/DL — HIGH (ref 70–99)
HCT VFR BLD CALC: 38.1 % — SIGNIFICANT CHANGE UP (ref 34.5–45)
HGB BLD-MCNC: 11.8 G/DL — SIGNIFICANT CHANGE UP (ref 11.5–15.5)
MCHC RBC-ENTMCNC: 28 PG — SIGNIFICANT CHANGE UP (ref 27–34)
MCHC RBC-ENTMCNC: 31 GM/DL — LOW (ref 32–36)
MCV RBC AUTO: 90.3 FL — SIGNIFICANT CHANGE UP (ref 80–100)
NRBC # BLD: 0 /100 WBCS — SIGNIFICANT CHANGE UP
NRBC # FLD: 0 K/UL — SIGNIFICANT CHANGE UP
PLATELET # BLD AUTO: 225 K/UL — SIGNIFICANT CHANGE UP (ref 150–400)
POTASSIUM SERPL-MCNC: 4 MMOL/L — SIGNIFICANT CHANGE UP (ref 3.5–5.3)
POTASSIUM SERPL-SCNC: 4 MMOL/L — SIGNIFICANT CHANGE UP (ref 3.5–5.3)
RBC # BLD: 4.22 M/UL — SIGNIFICANT CHANGE UP (ref 3.8–5.2)
RBC # FLD: 14.3 % — SIGNIFICANT CHANGE UP (ref 10.3–14.5)
SARS-COV-2 RNA SPEC QL NAA+PROBE: SIGNIFICANT CHANGE UP
SODIUM SERPL-SCNC: 135 MMOL/L — SIGNIFICANT CHANGE UP (ref 135–145)
TROPONIN T, HIGH SENSITIVITY RESULT: 8 NG/L — SIGNIFICANT CHANGE UP
WBC # BLD: 11.14 K/UL — HIGH (ref 3.8–10.5)
WBC # FLD AUTO: 11.14 K/UL — HIGH (ref 3.8–10.5)

## 2021-04-15 PROCEDURE — 93010 ELECTROCARDIOGRAM REPORT: CPT

## 2021-04-15 PROCEDURE — 99233 SBSQ HOSP IP/OBS HIGH 50: CPT

## 2021-04-15 RX ORDER — INSULIN LISPRO 100/ML
3 VIAL (ML) SUBCUTANEOUS
Refills: 0 | Status: DISCONTINUED | OUTPATIENT
Start: 2021-04-15 | End: 2021-04-18

## 2021-04-15 RX ORDER — INSULIN GLARGINE 100 [IU]/ML
16 INJECTION, SOLUTION SUBCUTANEOUS AT BEDTIME
Refills: 0 | Status: DISCONTINUED | OUTPATIENT
Start: 2021-04-15 | End: 2021-04-18

## 2021-04-15 RX ORDER — BENZOCAINE 10 %
1 GEL (GRAM) MUCOUS MEMBRANE THREE TIMES A DAY
Refills: 0 | Status: DISCONTINUED | OUTPATIENT
Start: 2021-04-15 | End: 2021-04-18

## 2021-04-15 RX ORDER — ALPRAZOLAM 0.25 MG
0.25 TABLET ORAL ONCE
Refills: 0 | Status: DISCONTINUED | OUTPATIENT
Start: 2021-04-15 | End: 2021-04-15

## 2021-04-15 RX ADMIN — PANTOPRAZOLE SODIUM 40 MILLIGRAM(S): 20 TABLET, DELAYED RELEASE ORAL at 05:43

## 2021-04-15 RX ADMIN — Medication 200 MILLIGRAM(S): at 01:31

## 2021-04-15 RX ADMIN — AMLODIPINE BESYLATE 5 MILLIGRAM(S): 2.5 TABLET ORAL at 05:43

## 2021-04-15 RX ADMIN — Medication 100 MILLIGRAM(S): at 21:53

## 2021-04-15 RX ADMIN — Medication 200 MILLIGRAM(S): at 17:39

## 2021-04-15 RX ADMIN — Medication 100 MILLIGRAM(S): at 05:43

## 2021-04-15 RX ADMIN — Medication 5 UNIT(S): at 08:57

## 2021-04-15 RX ADMIN — Medication 300 MILLIGRAM(S): at 23:05

## 2021-04-15 RX ADMIN — Medication 100 MILLIGRAM(S): at 12:37

## 2021-04-15 RX ADMIN — ENOXAPARIN SODIUM 40 MILLIGRAM(S): 100 INJECTION SUBCUTANEOUS at 12:37

## 2021-04-15 RX ADMIN — Medication 0.25 MILLIGRAM(S): at 17:39

## 2021-04-15 RX ADMIN — BUDESONIDE AND FORMOTEROL FUMARATE DIHYDRATE 2 PUFF(S): 160; 4.5 AEROSOL RESPIRATORY (INHALATION) at 21:54

## 2021-04-15 RX ADMIN — SODIUM CHLORIDE 4 MILLILITER(S): 9 INJECTION INTRAMUSCULAR; INTRAVENOUS; SUBCUTANEOUS at 23:00

## 2021-04-15 RX ADMIN — SENNA PLUS 2 TABLET(S): 8.6 TABLET ORAL at 21:54

## 2021-04-15 RX ADMIN — DIPHENHYDRAMINE HYDROCHLORIDE AND LIDOCAINE HYDROCHLORIDE AND ALUMINUM HYDROXIDE AND MAGNESIUM HYDRO 10 MILLILITER(S): KIT at 05:42

## 2021-04-15 RX ADMIN — Medication 30 MILLILITER(S): at 12:36

## 2021-04-15 RX ADMIN — Medication 1: at 21:52

## 2021-04-15 RX ADMIN — DIPHENHYDRAMINE HYDROCHLORIDE AND LIDOCAINE HYDROCHLORIDE AND ALUMINUM HYDROXIDE AND MAGNESIUM HYDRO 10 MILLILITER(S): KIT at 12:41

## 2021-04-15 RX ADMIN — SODIUM CHLORIDE 4 MILLILITER(S): 9 INJECTION INTRAMUSCULAR; INTRAVENOUS; SUBCUTANEOUS at 15:22

## 2021-04-15 RX ADMIN — Medication 325 MILLIGRAM(S): at 12:37

## 2021-04-15 RX ADMIN — INSULIN GLARGINE 18 UNIT(S): 100 INJECTION, SOLUTION SUBCUTANEOUS at 21:53

## 2021-04-15 RX ADMIN — Medication 20 MILLIGRAM(S): at 05:42

## 2021-04-15 RX ADMIN — Medication 1 SPRAY(S): at 12:38

## 2021-04-15 RX ADMIN — DIPHENHYDRAMINE HYDROCHLORIDE AND LIDOCAINE HYDROCHLORIDE AND ALUMINUM HYDROXIDE AND MAGNESIUM HYDRO 10 MILLILITER(S): KIT at 21:53

## 2021-04-15 RX ADMIN — SODIUM CHLORIDE 4 MILLILITER(S): 9 INJECTION INTRAMUSCULAR; INTRAVENOUS; SUBCUTANEOUS at 08:45

## 2021-04-15 RX ADMIN — Medication 3 MILLILITER(S): at 08:44

## 2021-04-15 RX ADMIN — Medication 1 TABLET(S): at 12:37

## 2021-04-15 RX ADMIN — POLYETHYLENE GLYCOL 3350 17 GRAM(S): 17 POWDER, FOR SOLUTION ORAL at 12:37

## 2021-04-15 RX ADMIN — Medication 5 UNIT(S): at 17:39

## 2021-04-15 RX ADMIN — BUDESONIDE AND FORMOTEROL FUMARATE DIHYDRATE 2 PUFF(S): 160; 4.5 AEROSOL RESPIRATORY (INHALATION) at 08:57

## 2021-04-15 RX ADMIN — MONTELUKAST 10 MILLIGRAM(S): 4 TABLET, CHEWABLE ORAL at 12:41

## 2021-04-15 RX ADMIN — Medication 3 MILLILITER(S): at 15:22

## 2021-04-15 RX ADMIN — Medication 3 MILLILITER(S): at 22:52

## 2021-04-15 RX ADMIN — Medication 30 MILLILITER(S): at 17:39

## 2021-04-15 RX ADMIN — Medication 37.5 MILLIGRAM(S): at 12:37

## 2021-04-15 RX ADMIN — Medication 5 UNIT(S): at 12:36

## 2021-04-15 RX ADMIN — Medication 2: at 12:36

## 2021-04-15 RX ADMIN — Medication 300 MILLIGRAM(S): at 08:45

## 2021-04-15 NOTE — PROGRESS NOTE ADULT - ASSESSMENT
66 y.o. 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 Pt now longer meeting sepsis criteria and is hemodynamically stable on IV Avycaz. However, pt with episodes of hypoglycemia in the setting of decreased oral intake and insulin use.

## 2021-04-15 NOTE — PROGRESS NOTE ADULT - PROBLEM SELECTOR PLAN 4
Per pt, on Basaglar 10u qhs and Admelog 2u qAC at home --> Noted hypoglycemic events likely due to decreased oral intake due to indigestion and acid reflux  - decrease to lantus 16 units, and premeal 3 units tid for now   - cont to monitor and adjust insulin prn

## 2021-04-15 NOTE — CHART NOTE - NSCHARTNOTEFT_GEN_A_CORE
Alerted by RN that Pt c/o chest pain. Pt assessed at bedside, offered  but requested daughter in-law translate. She endorses that she woke up this am and "felt like her blood sugar was low." POC Glu checked by RN 69, increased to 2 above 100 after juice. Pt also notes moderate chest tightness following a coughing fit earlier but states that it has now resolved. Pt states she's has had similar episodes of chest tightness at home and believes it may be 2/2 anxiety but feels better now. Denies F/C, N/V, Dizziness, diaphoresis, chest pain, palpitations. VSS. NAD, Lungs with scattered wheezing, rhonchi b/l. EKG w/out DORYS, TWI. Due for duoneb now, c/w incentive spirometry, aerobica device. Will continue to monitor.

## 2021-04-15 NOTE — PROGRESS NOTE ADULT - SUBJECTIVE AND OBJECTIVE BOX
Patient is a 66y old  Female who presents with a chief complaint of Pneumonia (14 Apr 2021 17:23)        SUBJECTIVE / OVERNIGHT EVENTS:  Patient with Patient with SOB with activity and also patient tends to feel anxious and not want to be left alone. Pt with persistent cough and acid reflux.     MEDICATIONS  (STANDING):  albuterol/ipratropium for Nebulization 3 milliLiter(s) Nebulizer every 8 hours  amLODIPine   Tablet 5 milliGRAM(s) Oral daily  benzocaine 15 mG/menthol 3.6 mG (Sugar-Free) Lozenge 1 Lozenge Oral every 6 hours  benzonatate 100 milliGRAM(s) Oral three times a day  budesonide 160 MICROgram(s)/formoterol 4.5 MICROgram(s) Inhaler 2 Puff(s) Inhalation two times a day  calcium carbonate 1250 mG  + Vitamin D (OsCal 500 + D) 1 Tablet(s) Oral daily  cyanocobalamin Injectable 1000 MICROGram(s) IntraMuscular <User Schedule>  dextrose 40% Gel 15 Gram(s) Oral once  dextrose 5%. 1000 milliLiter(s) (50 mL/Hr) IV Continuous <Continuous>  dextrose 5%. 1000 milliLiter(s) (100 mL/Hr) IV Continuous <Continuous>  dextrose 50% Injectable 25 Gram(s) IV Push once  dextrose 50% Injectable 12.5 Gram(s) IV Push once  dextrose 50% Injectable 25 Gram(s) IV Push once  dextrose 50% Injectable 25 Gram(s) IV Push once  enoxaparin Injectable 40 milliGRAM(s) SubCutaneous daily  ferrous    sulfate 325 milliGRAM(s) Oral daily  FIRST- Mouthwash  BLM 10 milliLiter(s) Swish and Spit every 8 hours  fluticasone propionate 50 MICROgram(s)/spray Nasal Spray 1 Spray(s) Both Nostrils <User Schedule>  glucagon  Injectable 1 milliGRAM(s) IntraMuscular once  influenza   Vaccine 0.5 milliLiter(s) IntraMuscular once  insulin glargine Injectable (LANTUS) 16 Unit(s) SubCutaneous at bedtime  insulin lispro (ADMELOG) corrective regimen sliding scale   SubCutaneous at bedtime  insulin lispro (ADMELOG) corrective regimen sliding scale   SubCutaneous three times a day before meals  insulin lispro Injectable (ADMELOG) 3 Unit(s) SubCutaneous three times a day before meals  montelukast 10 milliGRAM(s) Oral daily  pantoprazole    Tablet 40 milliGRAM(s) Oral before breakfast  polyethylene glycol 3350 17 Gram(s) Oral daily  predniSONE   Tablet 20 milliGRAM(s) Oral daily  senna 2 Tablet(s) Oral at bedtime  sodium chloride 7% Inhalation 4 milliLiter(s) Inhalation every 8 hours  tiotropium 18 MICROgram(s) Capsule 1 Capsule(s) Inhalation daily  tobramycin for Nebulization 300 milliGRAM(s) Inhalation every 12 hours  venlafaxine XR. 37.5 milliGRAM(s) Oral daily    MEDICATIONS  (PRN):  acetaminophen   Tablet .. 650 milliGRAM(s) Oral every 6 hours PRN Mild Pain (1 - 3)  aluminum hydroxide/magnesium hydroxide/simethicone Suspension 30 milliLiter(s) Oral every 4 hours PRN Dyspepsia  benzocaine 20% Spray 1 Spray(s) Topical three times a day PRN sore throat  guaiFENesin   Syrup  (Sugar-Free) 200 milliGRAM(s) Oral every 6 hours PRN Cough      Vital Signs Last 24 Hrs  T(C): 36.6 (15 Apr 2021 21:51), Max: 36.6 (15 Apr 2021 15:27)  T(F): 97.9 (15 Apr 2021 21:51), Max: 97.9 (15 Apr 2021 21:51)  HR: 80 (15 Apr 2021 23:05) (80 - 99)  BP: 148/68 (15 Apr 2021 21:51) (145/65 - 148/68)  RR: 18 (15 Apr 2021 21:51) (18 - 18)  SpO2: 99% (15 Apr 2021 23:05) (96% - 100%)    CAPILLARY BLOOD GLUCOSE      POCT Blood Glucose.: 295 mg/dL (15 Apr 2021 21:37)  POCT Blood Glucose.: 133 mg/dL (15 Apr 2021 17:09)  POCT Blood Glucose.: 230 mg/dL (15 Apr 2021 12:07)  POCT Blood Glucose.: 140 mg/dL (15 Apr 2021 08:20)  POCT Blood Glucose.: 162 mg/dL (15 Apr 2021 05:35)  POCT Blood Glucose.: 154 mg/dL (15 Apr 2021 05:19)  POCT Blood Glucose.: 84 mg/dL (15 Apr 2021 04:58)  POCT Blood Glucose.: 69 mg/dL (15 Apr 2021 04:49)            PHYSICAL EXAM  GENERAL: NAD, thin, non-toxic appearing, on RA  CHEST/LUNG: Good air movement; Scattered low-pitched expiratory rhonchi.   HEART: Regular rate and rhythm; No murmurs, rubs, or gallops  ABDOMEN: Soft, Nontender, Nondistended; Bowel sounds present  EXTREMITIES:  2+ Peripheral Pulses, No clubbing, cyanosis, or edema  PSYCH: AAOx3    LABS:                        11.8   11.14 )-----------( 225      ( 15 Apr 2021 07:23 )             38.1     04-15    135  |  99  |  15  ----------------------------<  157<H>  4.0   |  28  |  0.42<L>    Ca    9.2      15 Apr 2021 07:23              Consultant(s) Notes Reviewed:    Care Discussed with Consultants/Other Providers:

## 2021-04-15 NOTE — PROGRESS NOTE ADULT - PROBLEM SELECTOR PLAN 1
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 Initially started on Zosyn but transitioned to Avycaz on 4/8/21. ID following, recs appreciated  - c/w Avycaz, currently on day 7 of 14 (abx course as recommended by pulm)  - c/w inhaled tobramycin 300mg q12; currently on day 15 (will discuss with ID and pulm regarding how long pt should be on medication)  - f/u ID recs  - BCx x2 NGTD.    - c/w nebulized budesonide, PRN albuterol nebs, and inhaled hypertonic saline.   - c/w Chest PT  - f/u pulm recs

## 2021-04-16 LAB
GLUCOSE BLDC GLUCOMTR-MCNC: 145 MG/DL — HIGH (ref 70–99)
GLUCOSE BLDC GLUCOMTR-MCNC: 150 MG/DL — HIGH (ref 70–99)
GLUCOSE BLDC GLUCOMTR-MCNC: 270 MG/DL — HIGH (ref 70–99)
GLUCOSE BLDC GLUCOMTR-MCNC: 276 MG/DL — HIGH (ref 70–99)

## 2021-04-16 PROCEDURE — 99233 SBSQ HOSP IP/OBS HIGH 50: CPT

## 2021-04-16 RX ADMIN — SODIUM CHLORIDE 4 MILLILITER(S): 9 INJECTION INTRAMUSCULAR; INTRAVENOUS; SUBCUTANEOUS at 07:23

## 2021-04-16 RX ADMIN — BUDESONIDE AND FORMOTEROL FUMARATE DIHYDRATE 2 PUFF(S): 160; 4.5 AEROSOL RESPIRATORY (INHALATION) at 21:30

## 2021-04-16 RX ADMIN — Medication 30 MILLILITER(S): at 10:53

## 2021-04-16 RX ADMIN — INSULIN GLARGINE 16 UNIT(S): 100 INJECTION, SOLUTION SUBCUTANEOUS at 21:44

## 2021-04-16 RX ADMIN — SODIUM CHLORIDE 4 MILLILITER(S): 9 INJECTION INTRAMUSCULAR; INTRAVENOUS; SUBCUTANEOUS at 23:17

## 2021-04-16 RX ADMIN — Medication 300 MILLIGRAM(S): at 07:39

## 2021-04-16 RX ADMIN — Medication 37.5 MILLIGRAM(S): at 12:11

## 2021-04-16 RX ADMIN — DIPHENHYDRAMINE HYDROCHLORIDE AND LIDOCAINE HYDROCHLORIDE AND ALUMINUM HYDROXIDE AND MAGNESIUM HYDRO 10 MILLILITER(S): KIT at 21:30

## 2021-04-16 RX ADMIN — Medication 1 TABLET(S): at 12:11

## 2021-04-16 RX ADMIN — Medication 100 MILLIGRAM(S): at 06:01

## 2021-04-16 RX ADMIN — Medication 3 UNIT(S): at 09:06

## 2021-04-16 RX ADMIN — Medication 20 MILLIGRAM(S): at 06:01

## 2021-04-16 RX ADMIN — MONTELUKAST 10 MILLIGRAM(S): 4 TABLET, CHEWABLE ORAL at 12:11

## 2021-04-16 RX ADMIN — Medication 300 MILLIGRAM(S): at 23:17

## 2021-04-16 RX ADMIN — DIPHENHYDRAMINE HYDROCHLORIDE AND LIDOCAINE HYDROCHLORIDE AND ALUMINUM HYDROXIDE AND MAGNESIUM HYDRO 10 MILLILITER(S): KIT at 12:13

## 2021-04-16 RX ADMIN — Medication 3 MILLILITER(S): at 15:36

## 2021-04-16 RX ADMIN — Medication 100 MILLIGRAM(S): at 12:13

## 2021-04-16 RX ADMIN — Medication 200 MILLIGRAM(S): at 06:32

## 2021-04-16 RX ADMIN — Medication 3 MILLILITER(S): at 07:22

## 2021-04-16 RX ADMIN — ENOXAPARIN SODIUM 40 MILLIGRAM(S): 100 INJECTION SUBCUTANEOUS at 12:13

## 2021-04-16 RX ADMIN — Medication 3 MILLILITER(S): at 23:17

## 2021-04-16 RX ADMIN — SENNA PLUS 2 TABLET(S): 8.6 TABLET ORAL at 21:30

## 2021-04-16 RX ADMIN — Medication 3 UNIT(S): at 12:12

## 2021-04-16 RX ADMIN — BUDESONIDE AND FORMOTEROL FUMARATE DIHYDRATE 2 PUFF(S): 160; 4.5 AEROSOL RESPIRATORY (INHALATION) at 09:06

## 2021-04-16 RX ADMIN — DIPHENHYDRAMINE HYDROCHLORIDE AND LIDOCAINE HYDROCHLORIDE AND ALUMINUM HYDROXIDE AND MAGNESIUM HYDRO 10 MILLILITER(S): KIT at 06:02

## 2021-04-16 RX ADMIN — PANTOPRAZOLE SODIUM 40 MILLIGRAM(S): 20 TABLET, DELAYED RELEASE ORAL at 06:01

## 2021-04-16 RX ADMIN — POLYETHYLENE GLYCOL 3350 17 GRAM(S): 17 POWDER, FOR SOLUTION ORAL at 12:11

## 2021-04-16 RX ADMIN — Medication 325 MILLIGRAM(S): at 12:14

## 2021-04-16 RX ADMIN — Medication 3: at 17:42

## 2021-04-16 RX ADMIN — Medication 3: at 12:12

## 2021-04-16 RX ADMIN — AMLODIPINE BESYLATE 5 MILLIGRAM(S): 2.5 TABLET ORAL at 06:02

## 2021-04-16 RX ADMIN — SODIUM CHLORIDE 4 MILLILITER(S): 9 INJECTION INTRAMUSCULAR; INTRAVENOUS; SUBCUTANEOUS at 15:36

## 2021-04-16 RX ADMIN — Medication 3 UNIT(S): at 17:42

## 2021-04-16 RX ADMIN — Medication 30 MILLILITER(S): at 17:42

## 2021-04-16 RX ADMIN — Medication 100 MILLIGRAM(S): at 21:30

## 2021-04-16 NOTE — CHART NOTE - NSCHARTNOTEFT_GEN_A_CORE
Patient is pending discharged to [facility]. Per ID will only do 7 days of Avycaz. Patient will need follow up with Dr. Young on discharge. Please decrease prednisone to 10mg starting tomorrow and continued on discharge. Please sent patient home with aime that she is currently using in the hospital. Check sats on RA and ambulation to assess for home O2. Will sign off.     Needs outpatient pulmonary follow up upon discharge at [address] [place] [address] [address] ([phone]).  Please e-mail [email or web address] to make an appointment for the patient.  Please include the name, , and a phone number for you and the patient.

## 2021-04-16 NOTE — PROGRESS NOTE ADULT - PROBLEM SELECTOR PLAN 4
Per pt, on Basaglar 10u qhs and Admelog 2u qAC at home --> Noted hypoglycemic events likely due to decreased oral intake due to indigestion and acid reflux  -  lantus 16 units, and premeal 3 units tid for now   - cont to monitor and adjust insulin prn

## 2021-04-16 NOTE — PROGRESS NOTE ADULT - PROBLEM SELECTOR PLAN 1
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 Initially started on Zosyn but transitioned to Avycaz on 4/8/21. ID following, recs appreciated  - c/w Avycaz, currently on day 8 of initial 14-day abx course as recommended by pulm. HOwever, must recent pulm eval recommends 7 as per ID recs. Will clarify  - c/w inhaled tobramycin 300mg q12 which pt should be on indefinitely for now as per pulm  - BCx x2 NGTD.    - c/w nebulized budesonide, PRN albuterol nebs, and inhaled hypertonic saline.   - c/w Chest PT  - f/u pulm recs

## 2021-04-16 NOTE — PROGRESS NOTE ADULT - PROBLEM SELECTOR PLAN 2
- decrease to prednisone 10mg daily tomorrow as per pulm recs  - evaluated by ENT and no thrush noted -->no further ENT recs  - c/w pantoprazole 40mg for GI ppx while on steroids

## 2021-04-16 NOTE — PROGRESS NOTE ADULT - SUBJECTIVE AND OBJECTIVE BOX
Patient is a 66y old  Female who presents with a chief complaint of Pneumonia (15 Apr 2021 17:02)    SUBJECTIVE / OVERNIGHT EVENTS:  Pt with persistent cough and wheezes but is otherwise doing okay. Pt mentioned still having heartburn.     MEDICATIONS  (STANDING):  albuterol/ipratropium for Nebulization 3 milliLiter(s) Nebulizer every 8 hours  amLODIPine   Tablet 5 milliGRAM(s) Oral daily  benzocaine 15 mG/menthol 3.6 mG (Sugar-Free) Lozenge 1 Lozenge Oral every 6 hours  benzonatate 100 milliGRAM(s) Oral three times a day  budesonide 160 MICROgram(s)/formoterol 4.5 MICROgram(s) Inhaler 2 Puff(s) Inhalation two times a day  calcium carbonate 1250 mG  + Vitamin D (OsCal 500 + D) 1 Tablet(s) Oral daily  cyanocobalamin Injectable 1000 MICROGram(s) IntraMuscular <User Schedule>  dextrose 40% Gel 15 Gram(s) Oral once  dextrose 5%. 1000 milliLiter(s) (50 mL/Hr) IV Continuous <Continuous>  dextrose 5%. 1000 milliLiter(s) (100 mL/Hr) IV Continuous <Continuous>  dextrose 50% Injectable 25 Gram(s) IV Push once  dextrose 50% Injectable 12.5 Gram(s) IV Push once  dextrose 50% Injectable 25 Gram(s) IV Push once  dextrose 50% Injectable 25 Gram(s) IV Push once  enoxaparin Injectable 40 milliGRAM(s) SubCutaneous daily  ferrous    sulfate 325 milliGRAM(s) Oral daily  FIRST- Mouthwash  BLM 10 milliLiter(s) Swish and Spit every 8 hours  fluticasone propionate 50 MICROgram(s)/spray Nasal Spray 1 Spray(s) Both Nostrils <User Schedule>  glucagon  Injectable 1 milliGRAM(s) IntraMuscular once  influenza   Vaccine 0.5 milliLiter(s) IntraMuscular once  insulin glargine Injectable (LANTUS) 16 Unit(s) SubCutaneous at bedtime  insulin lispro (ADMELOG) corrective regimen sliding scale   SubCutaneous three times a day before meals  insulin lispro (ADMELOG) corrective regimen sliding scale   SubCutaneous at bedtime  insulin lispro Injectable (ADMELOG) 3 Unit(s) SubCutaneous three times a day before meals  montelukast 10 milliGRAM(s) Oral daily  pantoprazole    Tablet 40 milliGRAM(s) Oral before breakfast  polyethylene glycol 3350 17 Gram(s) Oral daily  senna 2 Tablet(s) Oral at bedtime  sodium chloride 7% Inhalation 4 milliLiter(s) Inhalation every 8 hours  tiotropium 18 MICROgram(s) Capsule 1 Capsule(s) Inhalation daily  tobramycin for Nebulization 300 milliGRAM(s) Inhalation every 12 hours  venlafaxine XR. 37.5 milliGRAM(s) Oral daily    MEDICATIONS  (PRN):  acetaminophen   Tablet .. 650 milliGRAM(s) Oral every 6 hours PRN Mild Pain (1 - 3)  aluminum hydroxide/magnesium hydroxide/simethicone Suspension 30 milliLiter(s) Oral every 4 hours PRN Dyspepsia  benzocaine 20% Spray 1 Spray(s) Topical three times a day PRN sore throat  guaiFENesin   Syrup  (Sugar-Free) 200 milliGRAM(s) Oral every 6 hours PRN Cough      Vital Signs Last 24 Hrs  T(C): 37.1 (16 Apr 2021 21:28), Max: 37.1 (16 Apr 2021 12:30)  T(F): 98.7 (16 Apr 2021 21:28), Max: 98.7 (16 Apr 2021 12:30)  HR: 83 (16 Apr 2021 21:28) (70 - 96)  BP: 135/68 (16 Apr 2021 21:28) (113/69 - 173/80)  BP(mean): --  RR: 18 (16 Apr 2021 21:28) (18 - 18)  SpO2: 99% (16 Apr 2021 21:28) (98% - 100%)  CAPILLARY BLOOD GLUCOSE      POCT Blood Glucose.: 145 mg/dL (16 Apr 2021 21:43)  POCT Blood Glucose.: 270 mg/dL (16 Apr 2021 17:21)  POCT Blood Glucose.: 276 mg/dL (16 Apr 2021 11:57)  POCT Blood Glucose.: 150 mg/dL (16 Apr 2021 08:24)    I&O's Summary        PHYSICAL EXAM  GENERAL: NAD, thin, non-toxic appearing, on RA  CHEST/LUNG: Good air movement; Scattered low-pitched expiratory rhonchi.   HEART: Regular rate and rhythm; No murmurs, rubs, or gallops  ABDOMEN: Soft, Nontender, Nondistended; Bowel sounds present  EXTREMITIES:  2+ Peripheral Pulses, No clubbing, cyanosis, or edema  PSYCH: AAOx3        LABS:                        11.8   11.14 )-----------( 225      ( 15 Apr 2021 07:23 )             38.1     04-15    135  |  99  |  15  ----------------------------<  157<H>  4.0   |  28  |  0.42<L>    Ca    9.2      15 Apr 2021 07:23            Consultant(s) Notes Reviewed:    Care Discussed with Consultants/Other Providers:

## 2021-04-17 LAB
GLUCOSE BLDC GLUCOMTR-MCNC: 119 MG/DL — HIGH (ref 70–99)
GLUCOSE BLDC GLUCOMTR-MCNC: 159 MG/DL — HIGH (ref 70–99)
GLUCOSE BLDC GLUCOMTR-MCNC: 187 MG/DL — HIGH (ref 70–99)
GLUCOSE BLDC GLUCOMTR-MCNC: 266 MG/DL — HIGH (ref 70–99)

## 2021-04-17 PROCEDURE — 99232 SBSQ HOSP IP/OBS MODERATE 35: CPT

## 2021-04-17 RX ADMIN — Medication 30 MILLILITER(S): at 11:48

## 2021-04-17 RX ADMIN — MONTELUKAST 10 MILLIGRAM(S): 4 TABLET, CHEWABLE ORAL at 11:50

## 2021-04-17 RX ADMIN — PANTOPRAZOLE SODIUM 40 MILLIGRAM(S): 20 TABLET, DELAYED RELEASE ORAL at 06:04

## 2021-04-17 RX ADMIN — Medication 300 MILLIGRAM(S): at 23:42

## 2021-04-17 RX ADMIN — Medication 200 MILLIGRAM(S): at 23:06

## 2021-04-17 RX ADMIN — Medication 37.5 MILLIGRAM(S): at 11:49

## 2021-04-17 RX ADMIN — POLYETHYLENE GLYCOL 3350 17 GRAM(S): 17 POWDER, FOR SOLUTION ORAL at 11:50

## 2021-04-17 RX ADMIN — Medication 650 MILLIGRAM(S): at 06:02

## 2021-04-17 RX ADMIN — BUDESONIDE AND FORMOTEROL FUMARATE DIHYDRATE 2 PUFF(S): 160; 4.5 AEROSOL RESPIRATORY (INHALATION) at 08:53

## 2021-04-17 RX ADMIN — Medication 3 MILLILITER(S): at 16:12

## 2021-04-17 RX ADMIN — SODIUM CHLORIDE 4 MILLILITER(S): 9 INJECTION INTRAMUSCULAR; INTRAVENOUS; SUBCUTANEOUS at 16:35

## 2021-04-17 RX ADMIN — Medication 100 MILLIGRAM(S): at 22:22

## 2021-04-17 RX ADMIN — Medication 1: at 08:53

## 2021-04-17 RX ADMIN — SODIUM CHLORIDE 4 MILLILITER(S): 9 INJECTION INTRAMUSCULAR; INTRAVENOUS; SUBCUTANEOUS at 06:45

## 2021-04-17 RX ADMIN — Medication 3 MILLILITER(S): at 23:40

## 2021-04-17 RX ADMIN — Medication 325 MILLIGRAM(S): at 11:49

## 2021-04-17 RX ADMIN — SODIUM CHLORIDE 4 MILLILITER(S): 9 INJECTION INTRAMUSCULAR; INTRAVENOUS; SUBCUTANEOUS at 23:42

## 2021-04-17 RX ADMIN — Medication 10 MILLIGRAM(S): at 06:04

## 2021-04-17 RX ADMIN — Medication 3 UNIT(S): at 08:53

## 2021-04-17 RX ADMIN — Medication 1: at 13:08

## 2021-04-17 RX ADMIN — Medication 3 UNIT(S): at 17:47

## 2021-04-17 RX ADMIN — DIPHENHYDRAMINE HYDROCHLORIDE AND LIDOCAINE HYDROCHLORIDE AND ALUMINUM HYDROXIDE AND MAGNESIUM HYDRO 10 MILLILITER(S): KIT at 22:25

## 2021-04-17 RX ADMIN — Medication 3 MILLILITER(S): at 06:43

## 2021-04-17 RX ADMIN — Medication 1: at 22:23

## 2021-04-17 RX ADMIN — Medication 100 MILLIGRAM(S): at 11:50

## 2021-04-17 RX ADMIN — DIPHENHYDRAMINE HYDROCHLORIDE AND LIDOCAINE HYDROCHLORIDE AND ALUMINUM HYDROXIDE AND MAGNESIUM HYDRO 10 MILLILITER(S): KIT at 06:04

## 2021-04-17 RX ADMIN — BENZOCAINE AND MENTHOL 1 LOZENGE: 5; 1 LIQUID ORAL at 23:06

## 2021-04-17 RX ADMIN — Medication 3 UNIT(S): at 13:08

## 2021-04-17 RX ADMIN — INSULIN GLARGINE 16 UNIT(S): 100 INJECTION, SOLUTION SUBCUTANEOUS at 22:23

## 2021-04-17 RX ADMIN — AMLODIPINE BESYLATE 5 MILLIGRAM(S): 2.5 TABLET ORAL at 06:04

## 2021-04-17 RX ADMIN — BUDESONIDE AND FORMOTEROL FUMARATE DIHYDRATE 2 PUFF(S): 160; 4.5 AEROSOL RESPIRATORY (INHALATION) at 22:22

## 2021-04-17 RX ADMIN — Medication 1 TABLET(S): at 11:49

## 2021-04-17 RX ADMIN — Medication 300 MILLIGRAM(S): at 06:44

## 2021-04-17 RX ADMIN — ENOXAPARIN SODIUM 40 MILLIGRAM(S): 100 INJECTION SUBCUTANEOUS at 11:49

## 2021-04-17 RX ADMIN — Medication 200 MILLIGRAM(S): at 11:48

## 2021-04-17 RX ADMIN — Medication 100 MILLIGRAM(S): at 06:04

## 2021-04-17 RX ADMIN — Medication 1 SPRAY(S): at 11:50

## 2021-04-17 RX ADMIN — SENNA PLUS 2 TABLET(S): 8.6 TABLET ORAL at 22:22

## 2021-04-17 NOTE — PROGRESS NOTE ADULT - PROBLEM SELECTOR PLAN 4
Per pt, on Basaglar 10u qhs and Admelog 2u qAC at home --> Noted hypoglycemic events likely due to decreased oral intake due to indigestion and acid reflux. No hypoglycemic events in past 48h  -  lantus 16 units, and premeal 3 units tid for now   - cont to monitor and adjust insulin prn

## 2021-04-17 NOTE — PROGRESS NOTE ADULT - SUBJECTIVE AND OBJECTIVE BOX
Patient is a 66y old  Female who presents with a chief complaint of Pneumonia (16 Apr 2021 18:42)        SUBJECTIVE / OVERNIGHT EVENTS:      MEDICATIONS  (STANDING):  albuterol/ipratropium for Nebulization 3 milliLiter(s) Nebulizer every 8 hours  amLODIPine   Tablet 5 milliGRAM(s) Oral daily  benzocaine 15 mG/menthol 3.6 mG (Sugar-Free) Lozenge 1 Lozenge Oral every 6 hours  benzonatate 100 milliGRAM(s) Oral three times a day  budesonide 160 MICROgram(s)/formoterol 4.5 MICROgram(s) Inhaler 2 Puff(s) Inhalation two times a day  calcium carbonate 1250 mG  + Vitamin D (OsCal 500 + D) 1 Tablet(s) Oral daily  cyanocobalamin Injectable 1000 MICROGram(s) IntraMuscular <User Schedule>  dextrose 40% Gel 15 Gram(s) Oral once  dextrose 5%. 1000 milliLiter(s) (50 mL/Hr) IV Continuous <Continuous>  dextrose 5%. 1000 milliLiter(s) (100 mL/Hr) IV Continuous <Continuous>  dextrose 50% Injectable 25 Gram(s) IV Push once  dextrose 50% Injectable 12.5 Gram(s) IV Push once  dextrose 50% Injectable 25 Gram(s) IV Push once  dextrose 50% Injectable 25 Gram(s) IV Push once  enoxaparin Injectable 40 milliGRAM(s) SubCutaneous daily  ferrous    sulfate 325 milliGRAM(s) Oral daily  FIRST- Mouthwash  BLM 10 milliLiter(s) Swish and Spit every 8 hours  fluticasone propionate 50 MICROgram(s)/spray Nasal Spray 1 Spray(s) Both Nostrils <User Schedule>  glucagon  Injectable 1 milliGRAM(s) IntraMuscular once  influenza   Vaccine 0.5 milliLiter(s) IntraMuscular once  insulin glargine Injectable (LANTUS) 16 Unit(s) SubCutaneous at bedtime  insulin lispro (ADMELOG) corrective regimen sliding scale   SubCutaneous three times a day before meals  insulin lispro (ADMELOG) corrective regimen sliding scale   SubCutaneous at bedtime  insulin lispro Injectable (ADMELOG) 3 Unit(s) SubCutaneous three times a day before meals  montelukast 10 milliGRAM(s) Oral daily  pantoprazole    Tablet 40 milliGRAM(s) Oral before breakfast  polyethylene glycol 3350 17 Gram(s) Oral daily  predniSONE   Tablet 10 milliGRAM(s) Oral daily  senna 2 Tablet(s) Oral at bedtime  sodium chloride 7% Inhalation 4 milliLiter(s) Inhalation every 8 hours  tiotropium 18 MICROgram(s) Capsule 1 Capsule(s) Inhalation daily  tobramycin for Nebulization 300 milliGRAM(s) Inhalation every 12 hours  venlafaxine XR. 37.5 milliGRAM(s) Oral daily    MEDICATIONS  (PRN):  acetaminophen   Tablet .. 650 milliGRAM(s) Oral every 6 hours PRN Mild Pain (1 - 3)  aluminum hydroxide/magnesium hydroxide/simethicone Suspension 30 milliLiter(s) Oral every 4 hours PRN Dyspepsia  benzocaine 20% Spray 1 Spray(s) Topical three times a day PRN sore throat  guaiFENesin   Syrup  (Sugar-Free) 200 milliGRAM(s) Oral every 6 hours PRN Cough      Vital Signs Last 24 Hrs  T(C): 36.8 (17 Apr 2021 15:07), Max: 37.1 (16 Apr 2021 21:28)  T(F): 98.2 (17 Apr 2021 15:07), Max: 98.7 (16 Apr 2021 21:28)  HR: 77 (17 Apr 2021 15:07) (70 - 98)  BP: 142/64 (17 Apr 2021 15:07) (135/68 - 164/69)  BP(mean): --  RR: 18 (17 Apr 2021 15:07) (18 - 18)  SpO2: 100% (17 Apr 2021 15:07) (93% - 100%)  CAPILLARY BLOOD GLUCOSE      POCT Blood Glucose.: 159 mg/dL (17 Apr 2021 12:16)  POCT Blood Glucose.: 187 mg/dL (17 Apr 2021 08:30)  POCT Blood Glucose.: 145 mg/dL (16 Apr 2021 21:43)  POCT Blood Glucose.: 270 mg/dL (16 Apr 2021 17:21)    I&O's Summary        PHYSICAL EXAM  GENERAL: NAD, well-developed  HEAD:  Atraumatic, Normocephalic  EYES: EOMI, PERRLA, conjunctiva and sclera clear  NECK: Supple, No JVD  CHEST/LUNG: Clear to auscultation bilaterally; No wheeze  HEART: Regular rate and rhythm; No murmurs, rubs, or gallops  ABDOMEN: Soft, Nontender, Nondistended; Bowel sounds present  EXTREMITIES:  2+ Peripheral Pulses, No clubbing, cyanosis, or edema  PSYCH: AAOx3  SKIN: No rashes or lesions    LABS:                    RADIOLOGY & ADDITIONAL TESTS:    Imaging Personally Reviewed:  Consultant(s) Notes Reviewed:    Care Discussed with Consultants/Other Providers:   Patient is a 66y old  Female who presents with a chief complaint of Pneumonia (16 Apr 2021 18:42)      SUBJECTIVE / OVERNIGHT EVENTS:  No acute events overnight. Pt with cough - no fever or chest pain. Pt eating meals w/o n/v or abdominal pain. Patient resting comfortably    MEDICATIONS  (STANDING):  albuterol/ipratropium for Nebulization 3 milliLiter(s) Nebulizer every 8 hours  amLODIPine   Tablet 5 milliGRAM(s) Oral daily  benzocaine 15 mG/menthol 3.6 mG (Sugar-Free) Lozenge 1 Lozenge Oral every 6 hours  benzonatate 100 milliGRAM(s) Oral three times a day  budesonide 160 MICROgram(s)/formoterol 4.5 MICROgram(s) Inhaler 2 Puff(s) Inhalation two times a day  calcium carbonate 1250 mG  + Vitamin D (OsCal 500 + D) 1 Tablet(s) Oral daily  cyanocobalamin Injectable 1000 MICROGram(s) IntraMuscular <User Schedule>  dextrose 40% Gel 15 Gram(s) Oral once  dextrose 5%. 1000 milliLiter(s) (50 mL/Hr) IV Continuous <Continuous>  dextrose 5%. 1000 milliLiter(s) (100 mL/Hr) IV Continuous <Continuous>  dextrose 50% Injectable 25 Gram(s) IV Push once  dextrose 50% Injectable 12.5 Gram(s) IV Push once  dextrose 50% Injectable 25 Gram(s) IV Push once  dextrose 50% Injectable 25 Gram(s) IV Push once  enoxaparin Injectable 40 milliGRAM(s) SubCutaneous daily  ferrous    sulfate 325 milliGRAM(s) Oral daily  FIRST- Mouthwash  BLM 10 milliLiter(s) Swish and Spit every 8 hours  fluticasone propionate 50 MICROgram(s)/spray Nasal Spray 1 Spray(s) Both Nostrils <User Schedule>  glucagon  Injectable 1 milliGRAM(s) IntraMuscular once  influenza   Vaccine 0.5 milliLiter(s) IntraMuscular once  insulin glargine Injectable (LANTUS) 16 Unit(s) SubCutaneous at bedtime  insulin lispro (ADMELOG) corrective regimen sliding scale   SubCutaneous three times a day before meals  insulin lispro (ADMELOG) corrective regimen sliding scale   SubCutaneous at bedtime  insulin lispro Injectable (ADMELOG) 3 Unit(s) SubCutaneous three times a day before meals  montelukast 10 milliGRAM(s) Oral daily  pantoprazole    Tablet 40 milliGRAM(s) Oral before breakfast  polyethylene glycol 3350 17 Gram(s) Oral daily  predniSONE   Tablet 10 milliGRAM(s) Oral daily  senna 2 Tablet(s) Oral at bedtime  sodium chloride 7% Inhalation 4 milliLiter(s) Inhalation every 8 hours  tiotropium 18 MICROgram(s) Capsule 1 Capsule(s) Inhalation daily  tobramycin for Nebulization 300 milliGRAM(s) Inhalation every 12 hours  venlafaxine XR. 37.5 milliGRAM(s) Oral daily    MEDICATIONS  (PRN):  acetaminophen   Tablet .. 650 milliGRAM(s) Oral every 6 hours PRN Mild Pain (1 - 3)  aluminum hydroxide/magnesium hydroxide/simethicone Suspension 30 milliLiter(s) Oral every 4 hours PRN Dyspepsia  benzocaine 20% Spray 1 Spray(s) Topical three times a day PRN sore throat  guaiFENesin   Syrup  (Sugar-Free) 200 milliGRAM(s) Oral every 6 hours PRN Cough      Vital Signs Last 24 Hrs  T(C): 36.8 (17 Apr 2021 15:07), Max: 37.1 (16 Apr 2021 21:28)  T(F): 98.2 (17 Apr 2021 15:07), Max: 98.7 (16 Apr 2021 21:28)  HR: 77 (17 Apr 2021 15:07) (70 - 98)  BP: 142/64 (17 Apr 2021 15:07) (135/68 - 164/69)  BP(mean): --  RR: 18 (17 Apr 2021 15:07) (18 - 18)  SpO2: 100% (17 Apr 2021 15:07) (93% - 100%)  CAPILLARY BLOOD GLUCOSE      POCT Blood Glucose.: 159 mg/dL (17 Apr 2021 12:16)  POCT Blood Glucose.: 187 mg/dL (17 Apr 2021 08:30)  POCT Blood Glucose.: 145 mg/dL (16 Apr 2021 21:43)  POCT Blood Glucose.: 270 mg/dL (16 Apr 2021 17:21)    I&O's Summary        PHYSICAL EXAM  GENERAL: NAD, thin, non-toxic appearing, on O2 via  NC  CHEST/LUNG: Normal respiratory effort, Good air movement; Scattered low-pitched expiratory rhonchi.   ABDOMEN: Soft, Nondistended;  EXTREMITIES:  2+ Peripheral Pulses, No clubbing, cyanosis, or edema

## 2021-04-17 NOTE — PROGRESS NOTE ADULT - PROBLEM SELECTOR PLAN 1
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 Initially started on Zosyn but transitioned to Avycaz on 4/8/21. ID following, recs appreciated  - c/w Avycaz, currently on day 9 of initial 14-day abx course as recommended by pulm. HOwever, must recent pulm eval recommends 7 as per ID recs. Will clarify  - c/w inhaled tobramycin 300mg q12 which pt should be on indefinitely for now as per pulm  - BCx x2 NGTD.    - c/w nebulized budesonide, PRN albuterol nebs, and inhaled hypertonic saline.   - c/w Chest PT  - f/u pulm recs

## 2021-04-17 NOTE — PROGRESS NOTE ADULT - PROBLEM SELECTOR PLAN 2
-  prednisone 10mg daily as per pulm recs  - evaluated by ENT and no thrush noted -->no further ENT recs  - c/w pantoprazole 40mg for GI ppx while on steroids

## 2021-04-18 ENCOUNTER — TRANSCRIPTION ENCOUNTER (OUTPATIENT)
Age: 66
End: 2021-04-18

## 2021-04-18 VITALS — OXYGEN SATURATION: 96 %

## 2021-04-18 DIAGNOSIS — J47.9 BRONCHIECTASIS, UNCOMPLICATED: ICD-10-CM

## 2021-04-18 LAB
ANION GAP SERPL CALC-SCNC: 5 MMOL/L — LOW (ref 7–14)
APPEARANCE UR: CLEAR — SIGNIFICANT CHANGE UP
BACTERIA # UR AUTO: ABNORMAL
BILIRUB UR-MCNC: NEGATIVE — SIGNIFICANT CHANGE UP
BUN SERPL-MCNC: 11 MG/DL — SIGNIFICANT CHANGE UP (ref 7–23)
CALCIUM SERPL-MCNC: 9.2 MG/DL — SIGNIFICANT CHANGE UP (ref 8.4–10.5)
CHLORIDE SERPL-SCNC: 94 MMOL/L — LOW (ref 98–107)
CO2 SERPL-SCNC: 37 MMOL/L — HIGH (ref 22–31)
COLOR SPEC: SIGNIFICANT CHANGE UP
CREAT SERPL-MCNC: 0.39 MG/DL — LOW (ref 0.5–1.3)
DIFF PNL FLD: NEGATIVE — SIGNIFICANT CHANGE UP
EPI CELLS # UR: 1 /HPF — SIGNIFICANT CHANGE UP (ref 0–5)
GLUCOSE BLDC GLUCOMTR-MCNC: 107 MG/DL — HIGH (ref 70–99)
GLUCOSE BLDC GLUCOMTR-MCNC: 111 MG/DL — HIGH (ref 70–99)
GLUCOSE BLDC GLUCOMTR-MCNC: 120 MG/DL — HIGH (ref 70–99)
GLUCOSE BLDC GLUCOMTR-MCNC: 175 MG/DL — HIGH (ref 70–99)
GLUCOSE BLDC GLUCOMTR-MCNC: 216 MG/DL — HIGH (ref 70–99)
GLUCOSE BLDC GLUCOMTR-MCNC: 56 MG/DL — LOW (ref 70–99)
GLUCOSE BLDC GLUCOMTR-MCNC: 67 MG/DL — LOW (ref 70–99)
GLUCOSE BLDC GLUCOMTR-MCNC: 93 MG/DL — SIGNIFICANT CHANGE UP (ref 70–99)
GLUCOSE SERPL-MCNC: 174 MG/DL — HIGH (ref 70–99)
GLUCOSE UR QL: ABNORMAL
HCT VFR BLD CALC: 40.3 % — SIGNIFICANT CHANGE UP (ref 34.5–45)
HGB BLD-MCNC: 12.7 G/DL — SIGNIFICANT CHANGE UP (ref 11.5–15.5)
HYALINE CASTS # UR AUTO: 0 /LPF — SIGNIFICANT CHANGE UP (ref 0–7)
KETONES UR-MCNC: NEGATIVE — SIGNIFICANT CHANGE UP
LEUKOCYTE ESTERASE UR-ACNC: ABNORMAL
MAGNESIUM SERPL-MCNC: 2 MG/DL — SIGNIFICANT CHANGE UP (ref 1.6–2.6)
MCHC RBC-ENTMCNC: 28 PG — SIGNIFICANT CHANGE UP (ref 27–34)
MCHC RBC-ENTMCNC: 31.5 GM/DL — LOW (ref 32–36)
MCV RBC AUTO: 88.8 FL — SIGNIFICANT CHANGE UP (ref 80–100)
NITRITE UR-MCNC: NEGATIVE — SIGNIFICANT CHANGE UP
NRBC # BLD: 0 /100 WBCS — SIGNIFICANT CHANGE UP
NRBC # FLD: 0 K/UL — SIGNIFICANT CHANGE UP
PH UR: 7 — SIGNIFICANT CHANGE UP (ref 5–8)
PHOSPHATE SERPL-MCNC: 2.2 MG/DL — LOW (ref 2.5–4.5)
PLATELET # BLD AUTO: 212 K/UL — SIGNIFICANT CHANGE UP (ref 150–400)
POTASSIUM SERPL-MCNC: 3.8 MMOL/L — SIGNIFICANT CHANGE UP (ref 3.5–5.3)
POTASSIUM SERPL-SCNC: 3.8 MMOL/L — SIGNIFICANT CHANGE UP (ref 3.5–5.3)
PROT UR-MCNC: ABNORMAL
RBC # BLD: 4.54 M/UL — SIGNIFICANT CHANGE UP (ref 3.8–5.2)
RBC # FLD: 13.8 % — SIGNIFICANT CHANGE UP (ref 10.3–14.5)
RBC CASTS # UR COMP ASSIST: 2 /HPF — SIGNIFICANT CHANGE UP (ref 0–4)
SODIUM SERPL-SCNC: 136 MMOL/L — SIGNIFICANT CHANGE UP (ref 135–145)
SP GR SPEC: 1.02 — SIGNIFICANT CHANGE UP (ref 1.01–1.02)
UROBILINOGEN FLD QL: SIGNIFICANT CHANGE UP
WBC # BLD: 10.67 K/UL — HIGH (ref 3.8–10.5)
WBC # FLD AUTO: 10.67 K/UL — HIGH (ref 3.8–10.5)
WBC UR QL: 10 /HPF — HIGH (ref 0–5)

## 2021-04-18 PROCEDURE — 99239 HOSP IP/OBS DSCHRG MGMT >30: CPT

## 2021-04-18 RX ORDER — BUDESONIDE AND FORMOTEROL FUMARATE DIHYDRATE 160; 4.5 UG/1; UG/1
2 AEROSOL RESPIRATORY (INHALATION)
Qty: 1 | Refills: 0
Start: 2021-04-18 | End: 2021-05-17

## 2021-04-18 RX ORDER — INSULIN LISPRO 100/ML
2 VIAL (ML) SUBCUTANEOUS
Qty: 0 | Refills: 0 | DISCHARGE

## 2021-04-18 RX ORDER — ALBUTEROL 90 UG/1
2 AEROSOL, METERED ORAL
Qty: 0 | Refills: 0 | DISCHARGE

## 2021-04-18 RX ORDER — DEXTROSE 50 % IN WATER 50 %
15 SYRINGE (ML) INTRAVENOUS ONCE
Refills: 0 | Status: COMPLETED | OUTPATIENT
Start: 2021-04-18 | End: 2021-04-18

## 2021-04-18 RX ORDER — BUDESONIDE, MICRONIZED 100 %
2 POWDER (GRAM) MISCELLANEOUS
Qty: 0 | Refills: 0 | DISCHARGE

## 2021-04-18 RX ORDER — ALBUTEROL 90 UG/1
3 AEROSOL, METERED ORAL
Qty: 0 | Refills: 0 | DISCHARGE

## 2021-04-18 RX ORDER — TIOTROPIUM BROMIDE 18 UG/1
1 CAPSULE ORAL; RESPIRATORY (INHALATION)
Qty: 1 | Refills: 0
Start: 2021-04-18 | End: 2021-05-17

## 2021-04-18 RX ORDER — FORMOTEROL FUMARATE 12 MCG
2 CAPSULE, WITH INHALATION DEVICE INHALATION
Qty: 0 | Refills: 0 | DISCHARGE

## 2021-04-18 RX ORDER — INSULIN LISPRO 100/ML
5 VIAL (ML) SUBCUTANEOUS
Qty: 3 | Refills: 0
Start: 2021-04-18 | End: 2021-05-17

## 2021-04-18 RX ORDER — ERGOCALCIFEROL 1.25 MG/1
1 CAPSULE ORAL
Qty: 0 | Refills: 0 | DISCHARGE

## 2021-04-18 RX ORDER — INSULIN GLARGINE 100 [IU]/ML
14 INJECTION, SOLUTION SUBCUTANEOUS
Qty: 5 | Refills: 0
Start: 2021-04-18 | End: 2021-05-17

## 2021-04-18 RX ORDER — INSULIN GLARGINE 100 [IU]/ML
10 INJECTION, SOLUTION SUBCUTANEOUS
Qty: 0 | Refills: 0 | DISCHARGE

## 2021-04-18 RX ADMIN — Medication 37.5 MILLIGRAM(S): at 12:54

## 2021-04-18 RX ADMIN — Medication 3 UNIT(S): at 12:53

## 2021-04-18 RX ADMIN — Medication 30 MILLILITER(S): at 03:04

## 2021-04-18 RX ADMIN — Medication 3 UNIT(S): at 08:53

## 2021-04-18 RX ADMIN — Medication 30 MILLILITER(S): at 08:53

## 2021-04-18 RX ADMIN — Medication 15 GRAM(S): at 02:42

## 2021-04-18 RX ADMIN — Medication 3 MILLILITER(S): at 15:42

## 2021-04-18 RX ADMIN — Medication 1 SPRAY(S): at 12:55

## 2021-04-18 RX ADMIN — SODIUM CHLORIDE 4 MILLILITER(S): 9 INJECTION INTRAMUSCULAR; INTRAVENOUS; SUBCUTANEOUS at 10:38

## 2021-04-18 RX ADMIN — Medication 1 TABLET(S): at 12:54

## 2021-04-18 RX ADMIN — Medication 325 MILLIGRAM(S): at 12:54

## 2021-04-18 RX ADMIN — DIPHENHYDRAMINE HYDROCHLORIDE AND LIDOCAINE HYDROCHLORIDE AND ALUMINUM HYDROXIDE AND MAGNESIUM HYDRO 10 MILLILITER(S): KIT at 06:11

## 2021-04-18 RX ADMIN — BUDESONIDE AND FORMOTEROL FUMARATE DIHYDRATE 2 PUFF(S): 160; 4.5 AEROSOL RESPIRATORY (INHALATION) at 08:53

## 2021-04-18 RX ADMIN — Medication 10 MILLIGRAM(S): at 06:09

## 2021-04-18 RX ADMIN — POLYETHYLENE GLYCOL 3350 17 GRAM(S): 17 POWDER, FOR SOLUTION ORAL at 12:54

## 2021-04-18 RX ADMIN — SODIUM CHLORIDE 4 MILLILITER(S): 9 INJECTION INTRAMUSCULAR; INTRAVENOUS; SUBCUTANEOUS at 15:43

## 2021-04-18 RX ADMIN — BENZOCAINE AND MENTHOL 1 LOZENGE: 5; 1 LIQUID ORAL at 12:54

## 2021-04-18 RX ADMIN — ENOXAPARIN SODIUM 40 MILLIGRAM(S): 100 INJECTION SUBCUTANEOUS at 12:54

## 2021-04-18 RX ADMIN — PANTOPRAZOLE SODIUM 40 MILLIGRAM(S): 20 TABLET, DELAYED RELEASE ORAL at 06:10

## 2021-04-18 RX ADMIN — Medication 2: at 12:53

## 2021-04-18 RX ADMIN — MONTELUKAST 10 MILLIGRAM(S): 4 TABLET, CHEWABLE ORAL at 12:54

## 2021-04-18 RX ADMIN — AMLODIPINE BESYLATE 5 MILLIGRAM(S): 2.5 TABLET ORAL at 06:10

## 2021-04-18 RX ADMIN — Medication 100 MILLIGRAM(S): at 12:54

## 2021-04-18 RX ADMIN — DIPHENHYDRAMINE HYDROCHLORIDE AND LIDOCAINE HYDROCHLORIDE AND ALUMINUM HYDROXIDE AND MAGNESIUM HYDRO 10 MILLILITER(S): KIT at 12:55

## 2021-04-18 RX ADMIN — Medication 100 MILLIGRAM(S): at 06:10

## 2021-04-18 RX ADMIN — Medication 3 MILLILITER(S): at 10:36

## 2021-04-18 RX ADMIN — BENZOCAINE AND MENTHOL 1 LOZENGE: 5; 1 LIQUID ORAL at 06:10

## 2021-04-18 NOTE — PROGRESS NOTE ADULT - ASSESSMENT
66 y.o. 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 Pt now longer meeting sepsis criteria and is hemodynamically stable on IV Avycaz. However, pt with episodes of labile glycemic control in the setting of decreased oral intake, decreased dose of steroids, and insulin use.

## 2021-04-18 NOTE — PROGRESS NOTE ADULT - PROVIDER SPECIALTY LIST ADULT
Hepatology
Hospitalist
Hospitalist
Infectious Disease
Pulmonology
Pulmonology
Hepatology
Hospitalist
Pulmonology
Hepatology
Hepatology
Pulmonology
Pulmonology
Hospitalist

## 2021-04-18 NOTE — PROVIDER CONTACT NOTE (OTHER) - ASSESSMENT
FS initial 66 repeat 84,
Patient informed RN that she experienced upper lip numbness when BP was 173/80 in AM. No facial drooping and neuro assessment of patient did not show signs of stroke. Patient denies upper lip numbness now after BP decreased 45minutes later and has mild headache only.
pt awake alert, asymptomatic
pt has a productive cough which thick white sputum
pt with a BP of 164/ 69
/80, complaining of mild headache
Patient a&ox4, drank 2 cups of apple juice, and ate dinner from home.
Pt feeling weak with headache. Feeling hypoglycemic but is alert and oriented, sitting up in bed. VSS stable. Voiding.
pt reported feeling off. alert responsive- FS checked- 56 rechecked 67.
bladder scanned pt and resulted 53ml

## 2021-04-18 NOTE — PROGRESS NOTE ADULT - PROBLEM SELECTOR PROBLEM 6
Hypertension
Prophylactic measure
Hypertension
Hypertension
Prophylactic measure
Hypertension
Prophylactic measure
Hypertension
Prophylactic measure

## 2021-04-18 NOTE — PROGRESS NOTE ADULT - PROBLEM SELECTOR PLAN 2
- Airway clearance with chest PT, albuterol-ipratropium nebs q6h, hypertonic saline bid, and Aerobika q2h, chest vest if patient tolerates  - OOB, incentive spirometer  Followed by pulm, credavian appreciated.  - Continue with Symbicort BID, fluticasone, montelukast, Duonebs Q8H (Spiriva on discharge)  - Continue with inhaled tobramycin and start azithromycin 250mg TIW as recommended by pulm  -  prednisone 10mg daily as per pulm recs  - evaluated by ENT and no thrush noted -->no further ENT recs  - c/w pantoprazole 40mg for GI ppx while on steroids  - as per pulm, pt has history of ABPA but no documentation outpatient and with negative aspergillus ag. Given h/o eosinophilia, will consider starting mepolizumab therapy as outpatient.  - outpatient pulmonary follow up with Dr. Young upon discharge at [address] [place] [address], [address] ([phone]).

## 2021-04-18 NOTE — PROVIDER CONTACT NOTE (OTHER) - NAME OF MD/NP/PA/DO NOTIFIED:
Wallace Altamirano [facility] #[id number]
Nehal ESCOBAR
Torie Venegas
MD Mcginnis
Taylor Grullon ACP
Maddison Gallo
Wallace Altamirano
Wallace Altamirano ACP
Wallace Altamirano ACP
wendie ann acp

## 2021-04-18 NOTE — PROGRESS NOTE ADULT - REASON FOR ADMISSION
Pneumonia

## 2021-04-18 NOTE — PROVIDER CONTACT NOTE (OTHER) - ACTION/TREATMENT ORDERED:
MD Mcginnis bedside and aware. Will change insulin orders.
ACP made aware. provider indicated to provide apple juice and reassess Q15 mins until 2 confirmed FS readings above 100
provider made aware, give tylenol and reassess BP in an hour after morning dose of amlodipine
Rechecked glucose until two results over 100. MD aware; will continue to monitor.
provider made aware. possible chronic issue. will continue to monitor
as per protocol
Continue to monitor
Will administer BP medication ordered and recheck BP in 30-45 min, continue to monitor
salena to give 22 units of lantus for .
ACP made aware. will provide cough medication. will continue to monitor.
follow hypoglycemic protocol

## 2021-04-18 NOTE — PROGRESS NOTE ADULT - PROBLEM SELECTOR PROBLEM 5
GERD (gastroesophageal reflux disease)
Hypertension
GERD (gastroesophageal reflux disease)
Hypertension
GERD (gastroesophageal reflux disease)
Hypertension
Hypertension
GERD (gastroesophageal reflux disease)

## 2021-04-18 NOTE — PROVIDER CONTACT NOTE (OTHER) - RECOMMENDATIONS
Checked sugar then gave apple juice x3; pt also ate pudding. MD aware, suggested lantus for bedtime tonight be adjusted.
Will administer BP medication ordered and recheck BP in 30-45 min
cough medication to help suppress cough
make provider aware and monitor
provide PRN tylenol, give blood pressure medication as scheduled and reassess
repeat [id number] until 2 readings above 100
Continue to monitor, monitor for neuro changes
juice given and finger stick up to 153 then 15 minutes later 156
hypoglycemic protocol followed. dextrose gel and will cont to monitor from there
lower dose of lantus?

## 2021-04-18 NOTE — PROGRESS NOTE ADULT - PROBLEM SELECTOR PLAN 4
Per pt, on Basaglar 10u qhs and Admelog 2u qAC at home --> Noted labile glucose readings. Suspect hypoglycemic events likely due to decreased oral intake due to indigestion and acid reflux as well as overcorrection to afternoon hyperglycemia.   - decrease to lantus 14 units, and premeal 5 units before breakfast, then 3 units before lunch and dinner. Recommend bedtime snack.    - cont to monitor and adjust insulin prn

## 2021-04-18 NOTE — PROGRESS NOTE ADULT - PROBLEM SELECTOR PLAN 1
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 Initially started on Zosyn but transitioned to Avycaz on 4/8/21. ID following, recs appreciated  - pt completed 7-day course of Avycaz from 4/8/21-4/14/21 with additional doses after. After discussion with pulm, it was determined that 7 days was adequate abx course as ID recommended previously. Therefore, no need for further IV abx at this time.   - c/w inhaled tobramycin 300mg q12   - BCx x2 NGTD.    - c/w nebulized budesonide, PRN albuterol nebs, and inhaled hypertonic saline.   - c/w Chest PT  - f/u pulm recs

## 2021-04-18 NOTE — PROGRESS NOTE ADULT - PROBLEM SELECTOR PROBLEM 2
ABPA (allergic bronchopulmonary aspergillosis)
Bronchiectasis without complication
ABPA (allergic bronchopulmonary aspergillosis)

## 2021-04-18 NOTE — PROGRESS NOTE ADULT - SUBJECTIVE AND OBJECTIVE BOX
Patient is a 66y old  Female who presents with a chief complaint of Pneumonia (2021 15:50)    SUBJECTIVE / OVERNIGHT EVENTS:  Noted hypoglycemic event overnight. Patient with persistent cough and mucus production. Pt ambulated with nurse today on RA and had O2 sat 97%. Pt still with overall weakness but improved. No fever, chills, n/v or abdominal pain. Pt stated she has home O2 at home already.    MEDICATIONS  (STANDING):  albuterol/ipratropium for Nebulization 3 milliLiter(s) Nebulizer every 8 hours  amLODIPine   Tablet 5 milliGRAM(s) Oral daily  benzocaine 15 mG/menthol 3.6 mG (Sugar-Free) Lozenge 1 Lozenge Oral every 6 hours  benzonatate 100 milliGRAM(s) Oral three times a day  budesonide 160 MICROgram(s)/formoterol 4.5 MICROgram(s) Inhaler 2 Puff(s) Inhalation two times a day  calcium carbonate 1250 mG  + Vitamin D (OsCal 500 + D) 1 Tablet(s) Oral daily  ceftazidime/avibactam IVPB 2.5 Gram(s) IV Intermittent every 8 hours  cyanocobalamin Injectable 1000 MICROGram(s) IntraMuscular <User Schedule>  dextrose 40% Gel 15 Gram(s) Oral once  dextrose 5%. 1000 milliLiter(s) (50 mL/Hr) IV Continuous <Continuous>  dextrose 5%. 1000 milliLiter(s) (100 mL/Hr) IV Continuous <Continuous>  dextrose 50% Injectable 25 Gram(s) IV Push once  dextrose 50% Injectable 12.5 Gram(s) IV Push once  dextrose 50% Injectable 25 Gram(s) IV Push once  dextrose 50% Injectable 25 Gram(s) IV Push once  enoxaparin Injectable 40 milliGRAM(s) SubCutaneous daily  ferrous    sulfate 325 milliGRAM(s) Oral daily  FIRST- Mouthwash  BLM 10 milliLiter(s) Swish and Spit every 8 hours  fluticasone propionate 50 MICROgram(s)/spray Nasal Spray 1 Spray(s) Both Nostrils <User Schedule>  glucagon  Injectable 1 milliGRAM(s) IntraMuscular once  influenza   Vaccine 0.5 milliLiter(s) IntraMuscular once  insulin glargine Injectable (LANTUS) 16 Unit(s) SubCutaneous at bedtime  insulin lispro (ADMELOG) corrective regimen sliding scale   SubCutaneous three times a day before meals  insulin lispro (ADMELOG) corrective regimen sliding scale   SubCutaneous at bedtime  insulin lispro Injectable (ADMELOG) 3 Unit(s) SubCutaneous three times a day before meals  montelukast 10 milliGRAM(s) Oral daily  pantoprazole    Tablet 40 milliGRAM(s) Oral before breakfast  polyethylene glycol 3350 17 Gram(s) Oral daily  predniSONE   Tablet 10 milliGRAM(s) Oral daily  senna 2 Tablet(s) Oral at bedtime  sodium chloride 7% Inhalation 4 milliLiter(s) Inhalation every 8 hours  tiotropium 18 MICROgram(s) Capsule 1 Capsule(s) Inhalation daily  tobramycin for Nebulization 300 milliGRAM(s) Inhalation every 12 hours  venlafaxine XR. 37.5 milliGRAM(s) Oral daily    MEDICATIONS  (PRN):  acetaminophen   Tablet .. 650 milliGRAM(s) Oral every 6 hours PRN Mild Pain (1 - 3)  aluminum hydroxide/magnesium hydroxide/simethicone Suspension 30 milliLiter(s) Oral every 4 hours PRN Dyspepsia  benzocaine 20% Spray 1 Spray(s) Topical three times a day PRN sore throat  guaiFENesin   Syrup  (Sugar-Free) 200 milliGRAM(s) Oral every 6 hours PRN Cough      Vital Signs Last 24 Hrs  T(C): 36.8 (2021 12:51), Max: 36.8 (2021 12:51)  T(F): 98.3 (2021 12:51), Max: 98.3 (2021 12:51)  HR: 90 (2021 12:51) (80 - 102)  BP: 135/63 (2021 12:51) (132/72 - 165/73)  RR: 20 (2021 12:51) (17 - 20)  SpO2: 97% (2021 12:51) (95% - 100%)  CAPILLARY BLOOD GLUCOSE      POCT Blood Glucose.: 216 mg/dL (2021 12:16)  POCT Blood Glucose.: 107 mg/dL (2021 09:51)  POCT Blood Glucose.: 93 mg/dL (2021 08:31)  POCT Blood Glucose.: 120 mg/dL (2021 03:01)  POCT Blood Glucose.: 111 mg/dL (2021 03:00)  POCT Blood Glucose.: 67 mg/dL (2021 02:37)  POCT Blood Glucose.: 56 mg/dL (2021 02:36)  POCT Blood Glucose.: 266 mg/dL (2021 21:45)  POCT Blood Glucose.: 119 mg/dL (2021 17:12)    I&O's Summary        PHYSICAL EXAM  GENERAL: NAD, thin, non-toxic appearing, sitting in the chair on RA  CHEST/LUNG: Normal respiratory effort, Good air movement; Scattered low-pitched expiratory rhonchi and wheezes  ABDOMEN: Soft, Nondistended;  EXTREMITIES:  2+ Peripheral Pulses, No clubbing, cyanosis, or edema          LABS:                        12.7   10.67 )-----------( 212      ( 2021 07:49 )             40.3     04-18    136  |  94<L>  |  11  ----------------------------<  174<H>  3.8   |  37<H>  |  0.39<L>    Ca    9.2      2021 07:49  Phos  2.2     04-18  Mg     2.0     04-18            Urinalysis Basic - ( 2021 07:49 )    Color: Light Yellow / Appearance: Clear / S.019 / pH: x  Gluc: x / Ketone: Negative  / Bili: Negative / Urobili: <2 mg/dL   Blood: x / Protein: Trace / Nitrite: Negative   Leuk Esterase: Moderate / RBC: 2 /HPF / WBC 10 /HPF   Sq Epi: x / Non Sq Epi: 1 /HPF / Bacteria: Occasional        Consultant(s) Notes Reviewed:  yes  Care Discussed with Consultants/Other Providers:

## 2021-04-18 NOTE — PROGRESS NOTE ADULT - PROBLEM SELECTOR PLAN 7
DVT ppx: Lovenox  Diet: CC  d/w daughter at bedside on 4/8
DVT ppx: Lovenox  Diet: CC  d/w daughter in law at bedside on 4/6
DVT ppx: Lovenox  Diet: CC    F/u with SW regarding dispo to nursing facility for abx administration
DVT ppx: Lovenox  Diet: CC  Daughter updated on patient's care today  F/u with SW regarding dispo to nursing facility for abx administration
DVT ppx: Lovenox  Diet: CC  d/w daughter Brian at bedside on 4/6
DVT ppx: Lovenox  Diet: CC  d/w daughter Inderdeep at bedside on 4/5
DVT ppx: Lovenox  Diet: CC    F/u with SW regarding dispo to nursing facility for abx administration
DVT ppx: Lovenox  Diet: CC  d/w daughter at bedside on 4/9
DVT ppx: Lovenox  Diet: CC    F/u with SW regarding dispo to nursing facility for abx administration
DVT ppx: Lovenox  Diet: CC    DISPO: Since no need for IV abx, will defer discharge to rehab. Plan to discharge patient home today with home PT referral. Pt will need close outpatient follow-up.
DVT ppx: Lovenox  Diet: CC  d/w daughter at bedside on 4/9
DVT ppx: Lovenox  Diet: CC    F/u with SW regarding dispo to nursing facility for abx administration
DVT ppx: Lovenox  Diet: CC  d/w daughter at bedside on 4/9
DVT ppx: Lovenox  Diet: CC    F/u with SW regarding dispo to nursing facility for abx administration

## 2021-04-18 NOTE — PROGRESS NOTE ADULT - PROBLEM SELECTOR PROBLEM 4
Abnormal LFTs
Abnormal LFTs
GERD (gastroesophageal reflux disease)
Type 2 diabetes mellitus with hyperglycemia, with long-term current use of insulin
Type 2 diabetes mellitus with hyperglycemia, with long-term current use of insulin
GERD (gastroesophageal reflux disease)
Type 2 diabetes mellitus with hyperglycemia, with long-term current use of insulin
Type 2 diabetes mellitus with hyperglycemia, with long-term current use of insulin
Abnormal LFTs
Type 2 diabetes mellitus with hyperglycemia, with long-term current use of insulin
Abnormal LFTs
Abnormal LFTs
Type 2 diabetes mellitus with hyperglycemia, with long-term current use of insulin
Type 2 diabetes mellitus with hyperglycemia, with long-term current use of insulin
Abnormal LFTs
GERD (gastroesophageal reflux disease)
GERD (gastroesophageal reflux disease)
Abnormal LFTs

## 2021-04-18 NOTE — PROGRESS NOTE ADULT - PROBLEM SELECTOR PROBLEM 3
Hepatic cirrhosis, unspecified hepatic cirrhosis type, unspecified whether ascites present
Type 2 diabetes mellitus with hyperglycemia, with long-term current use of insulin
Type 2 diabetes mellitus with hyperglycemia, with long-term current use of insulin
Hepatic cirrhosis, unspecified hepatic cirrhosis type, unspecified whether ascites present
Type 2 diabetes mellitus with hyperglycemia, with long-term current use of insulin
Type 2 diabetes mellitus with hyperglycemia, with long-term current use of insulin
Hepatic cirrhosis, unspecified hepatic cirrhosis type, unspecified whether ascites present
Type 2 diabetes mellitus with hyperglycemia, with long-term current use of insulin
Type 2 diabetes mellitus with hyperglycemia, with long-term current use of insulin
Hepatic cirrhosis, unspecified hepatic cirrhosis type, unspecified whether ascites present
Type 2 diabetes mellitus with hyperglycemia, with long-term current use of insulin
Hepatic cirrhosis, unspecified hepatic cirrhosis type, unspecified whether ascites present
Type 2 diabetes mellitus with hyperglycemia, with long-term current use of insulin
Hepatic cirrhosis, unspecified hepatic cirrhosis type, unspecified whether ascites present
Type 2 diabetes mellitus with hyperglycemia, with long-term current use of insulin
Hepatic cirrhosis, unspecified hepatic cirrhosis type, unspecified whether ascites present

## 2021-04-18 NOTE — PROGRESS NOTE ADULT - NSICDXPILOT_GEN_ALL_CORE
[place]

## 2021-04-18 NOTE — PROVIDER CONTACT NOTE (OTHER) - SITUATION
Pt. blood glucose before lunch is 500; repeat 526.
patient FS 82,  2 cups of apple juice given, FS now 160.
pt reported feeling off. alert responsive- FS checked- 56 rechecked 67.
pt with an elevated BP with morning vitals complains of headache.
Patient informed RN that she experienced upper lip numbness when BP was 173/80 in AM. Patient did not inform RN of lip numbness at 0558 when BP was high. Pt. informed RN of the lip numbness at 640.
Patient is hypertensive in AM, 173/80 BP
pt complains that she has the urge to urinate but when she goes to the bathroom only a few drops come down
Pt felt hypoglycemic, asked to check sugar, checked pt was hypoglycemic at 62, rechecked right away, pt sugar was 70.
pt complains of frequent cough not relieved
pt with morning blood glucose of 54 finger stick done resulting 66
pt woke up requesting a blood sugar check because she felt hypoglycemic

## 2021-04-18 NOTE — DISCHARGE NOTE NURSING/CASE MANAGEMENT/SOCIAL WORK - PATIENT PORTAL LINK FT
You can access the FollowMyHealth Patient Portal offered by [facility] by registering at the following website: [email or web address]. By joining [email or web address]’s FollowMyHealth portal, you will also be able to view your health information using other applications (apps) compatible with our system.

## 2021-04-18 NOTE — PROVIDER CONTACT NOTE (OTHER) - REASON
Pt hypoglycemic
hyperglycemic
pt complains of frequent coughing
Patient hypertensive
pt with elevated BP
patient FS 82
pt with low blood sugar
low finger stick
Pt felt hypoglycemic; sugar checked 62 then 70
pt complains of possible urinary retention
Patient informed RN that she experienced upper lip numbness when BP was 173/80 in AM

## 2021-04-18 NOTE — CHART NOTE - NSCHARTNOTEFT_GEN_A_CORE
Pt hypoglycemic to 67 s/p 16U of Lantus and 1U ISS. Hypoglycemic protocol followed, pt FS now 120. Will continue to monitor. Pt hypoglycemic to 67 s/p 16U of Lantus and 1U ISS. Pt was not feeling well. Hypoglycemic protocol followed, pt FS now 120. Will continue to monitor. Pt hypoglycemic to 67 s/p 16U of Lantus and 1U ISS. Pt was not feeling well. Hypoglycemic protocol followed, pt FS now 120. Will continue to monitor.    Pt also endorsed incomplete emptying of bladder. Pt states she takes .4mg of Flomax at home. However, will obtain UA as pt had UTI on admission.

## 2021-04-18 NOTE — PROVIDER CONTACT NOTE (OTHER) - BACKGROUND
Patient has history of HTN
Patient has hx HTN
pt admitted with hypoxia
Dx PNA, hx of DM2, HTN, Bronchitis, GERD, Pseudomonas.
Shortness of breath
pt admitted for shortness of breath and o  contact for pseud in sputum
Patient admitted with SOB/ Pneumonia.
pt admitted for SOB, pt with PMH of DM2
pt on contact for pseud in sputum /admitted for shortness of breath
Pt. asymptomatic.
pt admitted for shortness of breath

## 2021-04-19 ENCOUNTER — NON-APPOINTMENT (OUTPATIENT)
Age: 66
End: 2021-04-19

## 2021-04-19 ENCOUNTER — INPATIENT (INPATIENT)
Facility: HOSPITAL | Age: 66
LOS: 1 days | Discharge: HOME HEALTH | End: 2021-04-21
Attending: HOSPITALIST | Admitting: HOSPITALIST
Payer: MEDICAID

## 2021-04-19 VITALS
SYSTOLIC BLOOD PRESSURE: 162 MMHG | HEIGHT: 65.5 IN | HEART RATE: 94 BPM | RESPIRATION RATE: 18 BRPM | OXYGEN SATURATION: 97 % | TEMPERATURE: 98 F | DIASTOLIC BLOOD PRESSURE: 91 MMHG

## 2021-04-19 DIAGNOSIS — Z90.49 ACQUIRED ABSENCE OF OTHER SPECIFIED PARTS OF DIGESTIVE TRACT: Chronic | ICD-10-CM

## 2021-04-19 PROBLEM — E11.9 TYPE 2 DIABETES MELLITUS WITHOUT COMPLICATIONS: Chronic | Status: ACTIVE | Noted: 2021-04-01

## 2021-04-19 LAB
ALBUMIN SERPL ELPH-MCNC: 3.8 G/DL — SIGNIFICANT CHANGE UP (ref 3.3–5)
ALP SERPL-CCNC: 240 U/L — HIGH (ref 40–120)
ALT FLD-CCNC: 58 U/L — HIGH (ref 4–33)
ANION GAP SERPL CALC-SCNC: 7 MMOL/L — SIGNIFICANT CHANGE UP (ref 7–14)
APTT BLD: 27.2 SEC — SIGNIFICANT CHANGE UP (ref 27–36.3)
AST SERPL-CCNC: 40 U/L — HIGH (ref 4–32)
BASOPHILS # BLD AUTO: 0.06 K/UL — SIGNIFICANT CHANGE UP (ref 0–0.2)
BASOPHILS NFR BLD AUTO: 0.7 % — SIGNIFICANT CHANGE UP (ref 0–2)
BILIRUB SERPL-MCNC: 0.3 MG/DL — SIGNIFICANT CHANGE UP (ref 0.2–1.2)
BLD GP AB SCN SERPL QL: NEGATIVE — SIGNIFICANT CHANGE UP
BUN SERPL-MCNC: 9 MG/DL — SIGNIFICANT CHANGE UP (ref 7–23)
CALCIUM SERPL-MCNC: 9.6 MG/DL — SIGNIFICANT CHANGE UP (ref 8.4–10.5)
CHLORIDE SERPL-SCNC: 92 MMOL/L — LOW (ref 98–107)
CO2 SERPL-SCNC: 33 MMOL/L — HIGH (ref 22–31)
CREAT SERPL-MCNC: 0.39 MG/DL — LOW (ref 0.5–1.3)
EOSINOPHIL # BLD AUTO: 0.56 K/UL — HIGH (ref 0–0.5)
EOSINOPHIL NFR BLD AUTO: 6.2 % — HIGH (ref 0–6)
GLUCOSE SERPL-MCNC: 217 MG/DL — HIGH (ref 70–99)
HCT VFR BLD CALC: 40.5 % — SIGNIFICANT CHANGE UP (ref 34.5–45)
HGB BLD-MCNC: 12.8 G/DL — SIGNIFICANT CHANGE UP (ref 11.5–15.5)
IANC: 5.7 K/UL — SIGNIFICANT CHANGE UP (ref 1.5–8.5)
IMM GRANULOCYTES NFR BLD AUTO: 0.4 % — SIGNIFICANT CHANGE UP (ref 0–1.5)
INR BLD: 1.01 RATIO — SIGNIFICANT CHANGE UP (ref 0.88–1.16)
LYMPHOCYTES # BLD AUTO: 1.86 K/UL — SIGNIFICANT CHANGE UP (ref 1–3.3)
LYMPHOCYTES # BLD AUTO: 20.5 % — SIGNIFICANT CHANGE UP (ref 13–44)
MCHC RBC-ENTMCNC: 27.6 PG — SIGNIFICANT CHANGE UP (ref 27–34)
MCHC RBC-ENTMCNC: 31.6 GM/DL — LOW (ref 32–36)
MCV RBC AUTO: 87.5 FL — SIGNIFICANT CHANGE UP (ref 80–100)
MONOCYTES # BLD AUTO: 0.86 K/UL — SIGNIFICANT CHANGE UP (ref 0–0.9)
MONOCYTES NFR BLD AUTO: 9.5 % — SIGNIFICANT CHANGE UP (ref 2–14)
NEUTROPHILS # BLD AUTO: 5.7 K/UL — SIGNIFICANT CHANGE UP (ref 1.8–7.4)
NEUTROPHILS NFR BLD AUTO: 62.7 % — SIGNIFICANT CHANGE UP (ref 43–77)
NRBC # BLD: 0 /100 WBCS — SIGNIFICANT CHANGE UP
NRBC # FLD: 0 K/UL — SIGNIFICANT CHANGE UP
OB PNL STL: NEGATIVE — SIGNIFICANT CHANGE UP
PLATELET # BLD AUTO: 212 K/UL — SIGNIFICANT CHANGE UP (ref 150–400)
POTASSIUM SERPL-MCNC: 4.3 MMOL/L — SIGNIFICANT CHANGE UP (ref 3.5–5.3)
POTASSIUM SERPL-SCNC: 4.3 MMOL/L — SIGNIFICANT CHANGE UP (ref 3.5–5.3)
PROT SERPL-MCNC: 7.2 G/DL — SIGNIFICANT CHANGE UP (ref 6–8.3)
PROTHROM AB SERPL-ACNC: 11.6 SEC — SIGNIFICANT CHANGE UP (ref 10.6–13.6)
RBC # BLD: 4.63 M/UL — SIGNIFICANT CHANGE UP (ref 3.8–5.2)
RBC # FLD: 13.6 % — SIGNIFICANT CHANGE UP (ref 10.3–14.5)
RH IG SCN BLD-IMP: POSITIVE — SIGNIFICANT CHANGE UP
SODIUM SERPL-SCNC: 132 MMOL/L — LOW (ref 135–145)
WBC # BLD: 9.08 K/UL — SIGNIFICANT CHANGE UP (ref 3.8–10.5)
WBC # FLD AUTO: 9.08 K/UL — SIGNIFICANT CHANGE UP (ref 3.8–10.5)

## 2021-04-19 PROCEDURE — 71045 X-RAY EXAM CHEST 1 VIEW: CPT | Mod: 26

## 2021-04-19 PROCEDURE — 99285 EMERGENCY DEPT VISIT HI MDM: CPT

## 2021-04-19 RX ORDER — ALBUTEROL 90 UG/1
2 AEROSOL, METERED ORAL ONCE
Refills: 0 | Status: COMPLETED | OUTPATIENT
Start: 2021-04-19 | End: 2021-04-19

## 2021-04-19 RX ADMIN — ALBUTEROL 2 PUFF(S): 90 AEROSOL, METERED ORAL at 21:36

## 2021-04-19 NOTE — ED ADULT NURSE NOTE - NSIMPLEMENTINTERV_GEN_ALL_ED
Implemented All Fall Risk Interventions:  [place] to call system. Call bell, personal items and telephone within reach. Instruct patient to call for assistance. Room bathroom lighting operational. Non-slip footwear when patient is off stretcher. Physically safe environment: no spills, clutter or unnecessary equipment. Stretcher in lowest position, wheels locked, appropriate side rails in place. Provide visual cue, wrist band, yellow gown, etc. Monitor gait and stability. Monitor for mental status changes and reorient to person, place, and time. Review medications for side effects contributing to fall risk. Reinforce activity limits and safety measures with patient and family.

## 2021-04-19 NOTE — ED ADULT TRIAGE NOTE - CHIEF COMPLAINT QUOTE
Pt with PMH of COPD, on home O2 2.5L, brought in by family for multiple episodes of black stool since yesterday. Pt was admitted here for PNA, discharged yesterday. Pt also c/o weakness and abd pain. Denies anticoagulant use.

## 2021-04-19 NOTE — ED PROVIDER NOTE - CLINICAL SUMMARY MEDICAL DECISION MAKING FREE TEXT BOX
67yo female with pmh diffuse cystitic bronchiectasis, primary ciliary dyskinesia, allergic bronchopulmonary aspergillosis, pseudomonas pneumonia, HTN, DM2 presenting with generalized weakness, black stools.  Will check labs, rectal exam with black stool so will send occult blood.  Check type and r/o infectious etiology.  Patient appears deconditioned with persistent cough, hypoxia? at home, likely admission.

## 2021-04-19 NOTE — ED PROVIDER NOTE - OBJECTIVE STATEMENT
67yo female with pmh diffuse cystitic bronchiectasis, primary ciliary dyskinesia, allergic bronchopulmonary aspergillosis, pseudomonas pneumonia, HTN, DM2 presenting with generalized weakness, black stools.  Recently admitted, treated for pna and went home yesterday.  Per daughter she has felt severe weakness and is unable to care for herself at home.  She continues to have productive cough.  States she had dark stools for past 3-4 days but had multiple dark bowel movements overnight and today.  Took pepto bismol for abdominal pain associated with bowel movements for first time 11am today. 65yo female with pmh diffuse cystitic bronchiectasis, primary ciliary dyskinesia, allergic bronchopulmonary aspergillosis, pseudomonas pneumonia, HTN, DM2 presenting with generalized weakness, black stools.  Recently admitted, treated for pna and went home yesterday.  Per daughter she has felt severe weakness and is unable to care for herself at home.  She continues to have productive cough.  States she had dark stools for past 3-4 days but had multiple dark bowel movements overnight and today.  Took pepto bismol for abdominal pain associated with bowel movements for first time 11am today.  Daughter also states she desatted to 80s today (on baseline 2.5L here).

## 2021-04-19 NOTE — DISCUSSION/SUMMARY
[ED] : a call from ED [FreeTextEntry1] : Attempted to reach pt by phone, message left for call back to discuss how shes doing and f/u \par Appears pt has appointment scheduled with Pulm on 5/7\par \par Discharge Date	18-Apr-2021\par Admission Date	31-Mar-2021 22:13\par Reason for Admission	Pneumonia\par \par Hospital Course	\par 66F w/ diffuse cystitic bronchiectasis, primary ciliary dyskinesia, allergic bronchopulmonary aspergillosis, pseudomonas pneumonia, HTN, DM2 p/w SOB, fevers a/w sepsis 2/2 PNA likely 2/2 Pseudomonas resistant to fluoroquinolones. CXR/CT chest with L apical PNA. Pulmonology/ID consulted and patient started on prednisone for bronchiectasis w/ inhaled Tobramycin and Avycaz for 7-10days for  pseudomonas PNA. BCx/UCx remain NTD. Of note, patient found to have (+) HepC Ab screen & HCV RNA negative for which hepatology was consulted. Per hepatology, appears to be NEW diagnosis of cirrhosis with AbdUS showing nodular liver & MRCP revealing for cirrhosis without evidence of liver masses. However, suspect HCV Ab (+) could be from prior exposure vs ?false positive.\par Avycaz planned to be completed 4/28\par \par

## 2021-04-19 NOTE — ED ADULT NURSE NOTE - OBJECTIVE STATEMENT
Pt arrives to room 24 presents with multiple episodes of black stool. Pt non-english speaking, daughter translated via cell phone stating pt was discharged from hospital yesterday when she arrived home she began having episodes of dark stool. As per daughter pt was in hospital for fever and "virus", pt has hx COPD, on 2L NC, resident at bedside assessing pt, pt NSR on CM, report endorsed to night shift RNThien.

## 2021-04-19 NOTE — ED PROVIDER NOTE - ATTENDING CONTRIBUTION TO CARE
Patient is a 67 yo F [place] speaking, with history of diffuse cystitic bronchiectasis, primary ciliary dyskinesia, allergic bronchopulmonary aspergillosis, pseudomonas pneumonia, HTN, DM2 here for evaluation of black stools. I was able to speak to patient directly. Patient's daughter was also on the phone providing history. Patient reports 3-4 days of black stools. She complains of weakness and persistent shortness of breath. She states she has abdominal cramping. She is unable to get up out of bed. Patient was admitted from 3/31 - 4/18 for pneumonia/ sepsis. She took pepto bismol after noticing black stools to calm her stomach. No prior episodes of GI bleed. Denies being on blood thinners. Per daughter, patient's O2 level goes down to 80's at times.     VS noted  Gen: weak, unable to speak in full sentences without coughing/ wheezing appears older than stated age  HEENT: EOMI, mmm  Lungs: b/l rhonchi, wheezing  CVS: RRR   Abd; Soft non tender, non distended   Ext: no edema  Skin: no rash  Neuro AAOx3 non focal clear speech  a/p: black stools - plan for labs, stool occult. Patient is very weak, can't get out of bed at home. Daughter is concerned about her weakness and desaturation. Will follow labs. Consider admission to arrange for home services/ rehab.   - Cammy RODRIGUES

## 2021-04-19 NOTE — ED PROVIDER NOTE - PHYSICAL EXAMINATION
General appearance: NAD, conversant, afebrile    Eyes: anicteric sclerae, ELROY, EOMI   HENT: Atraumatic; oropharynx clear, MMM and no ulcerations, no pharyngeal erythema or exudate   Neck: Trachea midline; Full range of motion, supple   Pulm: Coarse rales, wheezing diffusely, normal respiratory effort and no intercostal retractions, normal work of breathing   CV: RRR, No murmurs, rubs, or gallops   Abdomen: Soft, non-tender, non-distended; no guarding or rebound   Extremities: No peripheral edema    Skin: Dry, normal temperature, turgor and texture; no rash   Psych: Appropriate affect, cooperative; alert and oriented to person, place and time    Rectal: No lesions, hemorrhoids, black stool chaperone Dr. Chawla

## 2021-04-19 NOTE — ED ADULT NURSE NOTE - PMH
ABPA (allergic bronchopulmonary aspergillosis)    Allergic rhinitis    Bronchiectasis    GERD (gastroesophageal reflux disease)    Hypertension    Microcytic anemia    Postnasal drip    Pseudomonas aeruginosa colonization    Recurrent pneumonia    Type 2 diabetes mellitus, with long-term current use of insulin    Vitamin D deficiency

## 2021-04-20 DIAGNOSIS — I10 ESSENTIAL (PRIMARY) HYPERTENSION: ICD-10-CM

## 2021-04-20 DIAGNOSIS — E11.9 TYPE 2 DIABETES MELLITUS WITHOUT COMPLICATIONS: ICD-10-CM

## 2021-04-20 DIAGNOSIS — K21.9 GASTRO-ESOPHAGEAL REFLUX DISEASE WITHOUT ESOPHAGITIS: ICD-10-CM

## 2021-04-20 DIAGNOSIS — J47.9 BRONCHIECTASIS, UNCOMPLICATED: ICD-10-CM

## 2021-04-20 DIAGNOSIS — R53.1 WEAKNESS: ICD-10-CM

## 2021-04-20 DIAGNOSIS — Z29.9 ENCOUNTER FOR PROPHYLACTIC MEASURES, UNSPECIFIED: ICD-10-CM

## 2021-04-20 DIAGNOSIS — E87.1 HYPO-OSMOLALITY AND HYPONATREMIA: ICD-10-CM

## 2021-04-20 LAB
ALBUMIN SERPL ELPH-MCNC: 3.6 G/DL — SIGNIFICANT CHANGE UP (ref 3.3–5)
ALP SERPL-CCNC: 238 U/L — HIGH (ref 40–120)
ALT FLD-CCNC: 61 U/L — HIGH (ref 4–33)
ANION GAP SERPL CALC-SCNC: 7 MMOL/L — SIGNIFICANT CHANGE UP (ref 7–14)
APPEARANCE UR: CLEAR — SIGNIFICANT CHANGE UP
AST SERPL-CCNC: 56 U/L — HIGH (ref 4–32)
BILIRUB SERPL-MCNC: 0.3 MG/DL — SIGNIFICANT CHANGE UP (ref 0.2–1.2)
BILIRUB UR-MCNC: NEGATIVE — SIGNIFICANT CHANGE UP
BUN SERPL-MCNC: 10 MG/DL — SIGNIFICANT CHANGE UP (ref 7–23)
CALCIUM SERPL-MCNC: 9.7 MG/DL — SIGNIFICANT CHANGE UP (ref 8.4–10.5)
CHLORIDE SERPL-SCNC: 97 MMOL/L — LOW (ref 98–107)
CO2 SERPL-SCNC: 31 MMOL/L — SIGNIFICANT CHANGE UP (ref 22–31)
COLOR SPEC: SIGNIFICANT CHANGE UP
CREAT SERPL-MCNC: 0.39 MG/DL — LOW (ref 0.5–1.3)
DIFF PNL FLD: NEGATIVE — SIGNIFICANT CHANGE UP
EPI CELLS # UR: 2 /HPF — SIGNIFICANT CHANGE UP (ref 0–5)
GLUCOSE BLDC GLUCOMTR-MCNC: 116 MG/DL — HIGH (ref 70–99)
GLUCOSE BLDC GLUCOMTR-MCNC: 132 MG/DL — HIGH (ref 70–99)
GLUCOSE BLDC GLUCOMTR-MCNC: 180 MG/DL — HIGH (ref 70–99)
GLUCOSE BLDC GLUCOMTR-MCNC: 246 MG/DL — HIGH (ref 70–99)
GLUCOSE SERPL-MCNC: 170 MG/DL — HIGH (ref 70–99)
GLUCOSE UR QL: ABNORMAL
KETONES UR-MCNC: NEGATIVE — SIGNIFICANT CHANGE UP
LEUKOCYTE ESTERASE UR-ACNC: NEGATIVE — SIGNIFICANT CHANGE UP
NITRITE UR-MCNC: NEGATIVE — SIGNIFICANT CHANGE UP
PH UR: 7.5 — SIGNIFICANT CHANGE UP (ref 5–8)
POTASSIUM SERPL-MCNC: 4.8 MMOL/L — SIGNIFICANT CHANGE UP (ref 3.5–5.3)
POTASSIUM SERPL-SCNC: 4.8 MMOL/L — SIGNIFICANT CHANGE UP (ref 3.5–5.3)
PROT SERPL-MCNC: 7.4 G/DL — SIGNIFICANT CHANGE UP (ref 6–8.3)
PROT UR-MCNC: ABNORMAL
RBC CASTS # UR COMP ASSIST: 1 /HPF — SIGNIFICANT CHANGE UP (ref 0–4)
SARS-COV-2 RNA SPEC QL NAA+PROBE: SIGNIFICANT CHANGE UP
SODIUM SERPL-SCNC: 135 MMOL/L — SIGNIFICANT CHANGE UP (ref 135–145)
SP GR SPEC: 1.01 — LOW (ref 1.01–1.02)
UROBILINOGEN FLD QL: SIGNIFICANT CHANGE UP
WBC UR QL: 1 /HPF — SIGNIFICANT CHANGE UP (ref 0–5)

## 2021-04-20 PROCEDURE — 99223 1ST HOSP IP/OBS HIGH 75: CPT

## 2021-04-20 RX ORDER — MONTELUKAST 4 MG/1
10 TABLET, CHEWABLE ORAL DAILY
Refills: 0 | Status: DISCONTINUED | OUTPATIENT
Start: 2021-04-20 | End: 2021-04-21

## 2021-04-20 RX ORDER — ASCORBIC ACID 60 MG
500 TABLET,CHEWABLE ORAL DAILY
Refills: 0 | Status: DISCONTINUED | OUTPATIENT
Start: 2021-04-20 | End: 2021-04-21

## 2021-04-20 RX ORDER — BUDESONIDE AND FORMOTEROL FUMARATE DIHYDRATE 160; 4.5 UG/1; UG/1
2 AEROSOL RESPIRATORY (INHALATION)
Refills: 0 | Status: DISCONTINUED | OUTPATIENT
Start: 2021-04-20 | End: 2021-04-21

## 2021-04-20 RX ORDER — IPRATROPIUM/ALBUTEROL SULFATE 18-103MCG
3 AEROSOL WITH ADAPTER (GRAM) INHALATION ONCE
Refills: 0 | Status: COMPLETED | OUTPATIENT
Start: 2021-04-20 | End: 2021-04-20

## 2021-04-20 RX ORDER — AMLODIPINE BESYLATE 2.5 MG/1
5 TABLET ORAL DAILY
Refills: 0 | Status: DISCONTINUED | OUTPATIENT
Start: 2021-04-20 | End: 2021-04-21

## 2021-04-20 RX ORDER — INSULIN LISPRO 100/ML
3 VIAL (ML) SUBCUTANEOUS
Refills: 0 | Status: DISCONTINUED | OUTPATIENT
Start: 2021-04-20 | End: 2021-04-21

## 2021-04-20 RX ORDER — HEPARIN SODIUM 5000 [USP'U]/ML
5000 INJECTION INTRAVENOUS; SUBCUTANEOUS EVERY 12 HOURS
Refills: 0 | Status: DISCONTINUED | OUTPATIENT
Start: 2021-04-20 | End: 2021-04-21

## 2021-04-20 RX ORDER — SODIUM CHLORIDE 9 MG/ML
1000 INJECTION, SOLUTION INTRAVENOUS
Refills: 0 | Status: DISCONTINUED | OUTPATIENT
Start: 2021-04-20 | End: 2021-04-21

## 2021-04-20 RX ORDER — BENZOCAINE 10 %
1 GEL (GRAM) MUCOUS MEMBRANE THREE TIMES A DAY
Refills: 0 | Status: DISCONTINUED | OUTPATIENT
Start: 2021-04-20 | End: 2021-04-21

## 2021-04-20 RX ORDER — IPRATROPIUM/ALBUTEROL SULFATE 18-103MCG
3 AEROSOL WITH ADAPTER (GRAM) INHALATION EVERY 6 HOURS
Refills: 0 | Status: DISCONTINUED | OUTPATIENT
Start: 2021-04-20 | End: 2021-04-20

## 2021-04-20 RX ORDER — TIOTROPIUM BROMIDE 18 UG/1
1 CAPSULE ORAL; RESPIRATORY (INHALATION) DAILY
Refills: 0 | Status: DISCONTINUED | OUTPATIENT
Start: 2021-04-20 | End: 2021-04-21

## 2021-04-20 RX ORDER — INSULIN LISPRO 100/ML
VIAL (ML) SUBCUTANEOUS
Refills: 0 | Status: DISCONTINUED | OUTPATIENT
Start: 2021-04-20 | End: 2021-04-21

## 2021-04-20 RX ORDER — VENLAFAXINE HCL 75 MG
37.5 CAPSULE, EXT RELEASE 24 HR ORAL DAILY
Refills: 0 | Status: DISCONTINUED | OUTPATIENT
Start: 2021-04-20 | End: 2021-04-21

## 2021-04-20 RX ORDER — DEXTROSE 50 % IN WATER 50 %
12.5 SYRINGE (ML) INTRAVENOUS ONCE
Refills: 0 | Status: DISCONTINUED | OUTPATIENT
Start: 2021-04-20 | End: 2021-04-21

## 2021-04-20 RX ORDER — INSULIN GLARGINE 100 [IU]/ML
10 INJECTION, SOLUTION SUBCUTANEOUS AT BEDTIME
Refills: 0 | Status: DISCONTINUED | OUTPATIENT
Start: 2021-04-20 | End: 2021-04-21

## 2021-04-20 RX ORDER — DEXTROSE 50 % IN WATER 50 %
25 SYRINGE (ML) INTRAVENOUS ONCE
Refills: 0 | Status: DISCONTINUED | OUTPATIENT
Start: 2021-04-20 | End: 2021-04-21

## 2021-04-20 RX ORDER — GLUCAGON INJECTION, SOLUTION 0.5 MG/.1ML
1 INJECTION, SOLUTION SUBCUTANEOUS ONCE
Refills: 0 | Status: DISCONTINUED | OUTPATIENT
Start: 2021-04-20 | End: 2021-04-21

## 2021-04-20 RX ORDER — FERROUS SULFATE 325(65) MG
325 TABLET ORAL DAILY
Refills: 0 | Status: DISCONTINUED | OUTPATIENT
Start: 2021-04-20 | End: 2021-04-21

## 2021-04-20 RX ORDER — ACETAMINOPHEN 500 MG
650 TABLET ORAL ONCE
Refills: 0 | Status: COMPLETED | OUTPATIENT
Start: 2021-04-20 | End: 2021-04-20

## 2021-04-20 RX ORDER — IPRATROPIUM/ALBUTEROL SULFATE 18-103MCG
3 AEROSOL WITH ADAPTER (GRAM) INHALATION EVERY 8 HOURS
Refills: 0 | Status: DISCONTINUED | OUTPATIENT
Start: 2021-04-20 | End: 2021-04-21

## 2021-04-20 RX ORDER — DEXTROSE 50 % IN WATER 50 %
15 SYRINGE (ML) INTRAVENOUS ONCE
Refills: 0 | Status: DISCONTINUED | OUTPATIENT
Start: 2021-04-20 | End: 2021-04-21

## 2021-04-20 RX ORDER — PANTOPRAZOLE SODIUM 20 MG/1
40 TABLET, DELAYED RELEASE ORAL
Refills: 0 | Status: DISCONTINUED | OUTPATIENT
Start: 2021-04-20 | End: 2021-04-21

## 2021-04-20 RX ORDER — AZITHROMYCIN 500 MG/1
250 TABLET, FILM COATED ORAL
Refills: 0 | Status: DISCONTINUED | OUTPATIENT
Start: 2021-04-20 | End: 2021-04-21

## 2021-04-20 RX ORDER — TOBRAMYCIN SULFATE 40 MG/ML
300 VIAL (ML) INJECTION EVERY 12 HOURS
Refills: 0 | Status: DISCONTINUED | OUTPATIENT
Start: 2021-04-20 | End: 2021-04-21

## 2021-04-20 RX ORDER — INSULIN LISPRO 100/ML
VIAL (ML) SUBCUTANEOUS AT BEDTIME
Refills: 0 | Status: DISCONTINUED | OUTPATIENT
Start: 2021-04-20 | End: 2021-04-21

## 2021-04-20 RX ORDER — PREGABALIN 225 MG/1
1000 CAPSULE ORAL
Refills: 0 | Status: DISCONTINUED | OUTPATIENT
Start: 2021-04-20 | End: 2021-04-21

## 2021-04-20 RX ORDER — FLUTICASONE PROPIONATE 50 MCG
1 SPRAY, SUSPENSION NASAL DAILY
Refills: 0 | Status: DISCONTINUED | OUTPATIENT
Start: 2021-04-20 | End: 2021-04-21

## 2021-04-20 RX ADMIN — AMLODIPINE BESYLATE 5 MILLIGRAM(S): 2.5 TABLET ORAL at 11:51

## 2021-04-20 RX ADMIN — Medication 3 UNIT(S): at 13:34

## 2021-04-20 RX ADMIN — HEPARIN SODIUM 5000 UNIT(S): 5000 INJECTION INTRAVENOUS; SUBCUTANEOUS at 18:10

## 2021-04-20 RX ADMIN — Medication 1 TABLET(S): at 11:51

## 2021-04-20 RX ADMIN — Medication 325 MILLIGRAM(S): at 11:51

## 2021-04-20 RX ADMIN — Medication 3 MILLILITER(S): at 17:28

## 2021-04-20 RX ADMIN — MONTELUKAST 10 MILLIGRAM(S): 4 TABLET, CHEWABLE ORAL at 12:09

## 2021-04-20 RX ADMIN — Medication 37.5 MILLIGRAM(S): at 11:51

## 2021-04-20 RX ADMIN — Medication 100 MILLIGRAM(S): at 21:43

## 2021-04-20 RX ADMIN — Medication 2: at 13:35

## 2021-04-20 RX ADMIN — ALBUTEROL 2 PUFF(S): 90 AEROSOL, METERED ORAL at 00:30

## 2021-04-20 RX ADMIN — Medication 3 MILLILITER(S): at 06:14

## 2021-04-20 RX ADMIN — Medication 3 UNIT(S): at 18:09

## 2021-04-20 RX ADMIN — PANTOPRAZOLE SODIUM 40 MILLIGRAM(S): 20 TABLET, DELAYED RELEASE ORAL at 12:09

## 2021-04-20 RX ADMIN — Medication 500 MILLIGRAM(S): at 11:51

## 2021-04-20 RX ADMIN — INSULIN GLARGINE 10 UNIT(S): 100 INJECTION, SOLUTION SUBCUTANEOUS at 22:41

## 2021-04-20 RX ADMIN — Medication 3 MILLILITER(S): at 22:29

## 2021-04-20 RX ADMIN — BUDESONIDE AND FORMOTEROL FUMARATE DIHYDRATE 2 PUFF(S): 160; 4.5 AEROSOL RESPIRATORY (INHALATION) at 22:09

## 2021-04-20 RX ADMIN — Medication 650 MILLIGRAM(S): at 06:12

## 2021-04-20 RX ADMIN — Medication 100 MILLIGRAM(S): at 11:51

## 2021-04-20 RX ADMIN — BUDESONIDE AND FORMOTEROL FUMARATE DIHYDRATE 2 PUFF(S): 160; 4.5 AEROSOL RESPIRATORY (INHALATION) at 11:51

## 2021-04-20 RX ADMIN — Medication 3 MILLILITER(S): at 13:00

## 2021-04-20 NOTE — H&P ADULT - PROBLEM SELECTOR PLAN 3
Patient states that she has increased indigestion and endorses sore throat with sensation of throat and tongue swelling.   Patient seen on 4/8 by ENT for dysphagia. Fiberoptic indirect laryngoscopy demonstrated No Base of tongue masses, Epiglottis sharp, no masses, No edema, Vocal cords mobile with mild erythema.  On omeprazole at home, continue with equivalent pantoprazole 40mg qd  Maalox/simethicone PRN Patient states that she has increased indigestion and endorses sore throat with sensation of throat and tongue swelling.   Patient seen on 4/8 by ENT for dysphagia. Fiberoptic indirect laryngoscopy demonstrated No Base of tongue masses, Epiglottis sharp, no masses, No edema, Vocal cords mobile with mild erythema.  On omeprazole at home, continue with equivalent pantoprazole 40mg qd  benzocaine 20% spray topical TID PRN for sore throat Per pt, on Basaglar 10u qhs and Admelog 2u qAC at home.  As per daughter, recent BG readings have been low likely secondary to poor PO intake  - soft consistent carbohydrate diet   - lantus 10 units, and premeal 3 units tid  - cont to monitor and adjust insulin prn.

## 2021-04-20 NOTE — H&P ADULT - NSHPPHYSICALEXAM_GEN_ALL_CORE
PHYSICAL EXAM:  Vital Signs Last 24 Hrs  T(C): 36.8 (20 Apr 2021 06:01), Max: 36.8 (20 Apr 2021 06:01)  T(F): 98.2 (20 Apr 2021 06:01), Max: 98.2 (20 Apr 2021 06:01)  HR: 99 (20 Apr 2021 06:01) (84 - 99)  BP: 154/71 (20 Apr 2021 06:01) (154/71 - 162/91)  BP(mean): --  RR: 21 (20 Apr 2021 06:01) (18 - 22)  SpO2: 97% (20 Apr 2021 06:01) (95% - 99%)    GENERAL: NAD, laying comfortably in bed  HEENT: dry oral mucus membranes.   NECK: Supple, No JVD, Normal thyroid  NERVOUS SYSTEM:  Alert & Oriented X3, Good concentration; No focal deficits.    CHEST/LUNG: Diffuse wheezing. No accessary muscle use.   HEART: Regular rate and rhythm; No murmurs, rubs, or gallops  ABDOMEN: Soft, Nontender, Bowel sounds present. Distended.  EXTREMITIES:  2+ Peripheral Pulses, No clubbing, cyanosis, or edema  SKIN: No rashes or lesions

## 2021-04-20 NOTE — H&P ADULT - PROBLEM SELECTOR PLAN 4
Per pt, on Basaglar 10u qhs and Admelog 2u qAC at home.  As per daughter, recent BG readings have been low likely secondary to poor PO intake  - consistent carbohydrate diet with bedtime snack  - lantus 14 units, and premeal 3 units tid  - cont to monitor and adjust insulin prn. Per pt, on Basaglar 10u qhs and Admelog 2u qAC at home.  As per daughter, recent BG readings have been low likely secondary to poor PO intake  - consistent carbohydrate diet with bedtime snack  - lantus 10 units, and premeal 3 units tid  - cont to monitor and adjust insulin prn. Patient states that breathing had not improved since last admission with episodes of desatting to the 80s.  In ED SpO2 had been maintained >90 on 3L via nasal cannula   - Continue with Symbicort BID, fluticasone, montelukast, Duonebs Q8H   - Continue prednisone 10mg daily as per previous pulm recs  - Continue tobramycin and azithromycin as per previous pulm rec  - continue to monitor O2 saturation, consult pulm if worsening

## 2021-04-20 NOTE — H&P ADULT - ASSESSMENT
patient is a 66 female with a history of diffuse cystitic bronchiectasis, primary ciliary dyskinesia, allergic bronchopulmonary aspergillosis, pseudomonas pneumonia, HTN, DM2 (on home insulin) that presents with weakness and black stools one day after discharge for pneumonia.  patient is a 66 female with a history of diffuse cystitic bronchiectasis, primary ciliary dyskinesia, allergic bronchopulmonary aspergillosis, pseudomonas pneumonia, HTN, DM2 (on home insulin) that presents with weakness and black stools

## 2021-04-20 NOTE — H&P ADULT - HISTORY OF PRESENT ILLNESS
Patient is a 66F with a PMH of diffuse cystitic bronchiectasis, primary ciliary dyskinesia, allergic bronchopulmonary aspergillosis, pseudomonas pneumonia, HTN, DM2 who presents one day after discharge with complaints of weakness and black stools.  Patient was recently hospitalized from March 31st-april 18th for sepsis secondary to pneumonia.  Patient states that since being discharged she feels as though she has been very weak and is unable to get up on her own. Also states that her cough has not improved.  Cough is less productive but patient states that she feels very congested and that she tries to force up sputum but worries "increased pressure will cause her breathing to stop."  She also endorses feeling a choking sensation and feels as though her throat and tongue are swollen stating she feels as though her "voice won't come out."  Also endorses early satiety and that she has had a loss of appetite for the past month.  States that she feels as though her acid reflux is getting worse and she is only hungry when she lays down.  States recently she has had stomach pain in which she took pepto bismol for.  Also that her stools are black in color and she has also had increased BM with several episodes of diarrhea.  Has been taking lomotil with some improvement.      Daughter states that the patient is unable to care for herself at home and that she has noticed increased weakness over the past day.  Also states that she is on 2-2.5L of home O2 but that she has been desatting to the 80s.  Also endorses low BG readings.  Daughter did not feel as though she should have been discharged in the 18th and that her mothers condition is not improving.   Patient is a 66F with a PMH of diffuse cystitic bronchiectasis, primary ciliary dyskinesia, allergic bronchopulmonary aspergillosis, pseudomonas pneumonia, HTN, DM2 who presents one day after discharge with complaints of weakness and black stools.  Patient was recently hospitalized from March 31st-april 18th for sepsis secondary to pneumonia.  Patient states that since being discharged she feels as though she has been very weak and is unable to get up on her own.     Also states that her cough has not improved.  Cough is less productive but patient states that she feels very congested and that she tries to force up sputum but worries "increased pressure will cause her breathing to stop."  She also endorses feeling a choking sensation and feels as though her throat and tongue are swollen stating she feels as though her "voice won't come out."  Also endorses early satiety and that she has had a loss of appetite for the past month.  States that she feels as though her acid reflux is getting worse and she is only hungry when she lays down.  States recently she has had stomach pain in which she took pepto bismol for.  Also that her stools are black in color and she has also had increased BM with several episodes of diarrhea.  Has been taking lomotil with some improvement.      Daughter states that the patient is unable to care for herself at home and that she has noticed increased weakness over the past day.  Also states that she is on 2-2.5L of home O2 but that she has been desatting to the 80s.  Also endorses low BG readings.  Daughter did not feel as though she should have been discharged in the 18th and that her mothers condition is not improving.   Patient is a 66F with a PMH of diffuse cystitic bronchiectasis, primary ciliary dyskinesia, allergic bronchopulmonary aspergillosis, pseudomonas pneumonia, HTN, DM2 (on home insulin) who presents one day after discharge with complaints of weakness and black stools.  Patient was recently hospitalized from March 31st-april 18th for sepsis secondary to pneumonia.  Patient states that since being discharged she feels as though she has been very weak and is unable to get up and walk on her own.  Also endorses nonproductive cough without improvement and feeling a choking sensation and feels as though her throat and tongue are swollen stating she feels as though her "voice won't come out."  Also complains of early satiety and that she has had a loss of appetite for the past month with worsening acid reflux Patient had stomach pain for the past few days in which she took pepto bismol for.  States she has had increased frequency of BM with intermittent watery diarrhea and black stool color.  Denies any gross blood. Has been taking lomotil with some improvement.  Denies any fever, chills, chest pain.       Daughter states that the patient is unable to care for herself at home and that she has noticed increased weakness over the past day.  Also states that she is on 2-2.5L of home O2 but that she has been desatting to the 80s.  Also endorses low BG readings.

## 2021-04-20 NOTE — H&P ADULT - NSHPLABSRESULTS_GEN_ALL_CORE
Labs:                          12.8   9.08  )-----------( 212      ( 2021 21:35 )             40.5       04-20    135  |  97<L>  |  10  ----------------------------<  170<H>  4.8   |  31  |  0.39<L>    Ca    9.7      2021 06:43    TPro  7.4  /  Alb  3.6  /  TBili  0.3  /  DBili  x   /  AST  56<H>  /  ALT  61<H>  /  AlkPhos  238<H>          Urinalysis Basic - ( 2021 23:43 )    Color: Light Yellow / Appearance: Clear / S.008 / pH: x  Gluc: x / Ketone: Negative  / Bili: Negative / Urobili: <2 mg/dL   Blood: x / Protein: Trace / Nitrite: Negative   Leuk Esterase: Negative / RBC: 1 /HPF / WBC 1 /HPF   Sq Epi: x / Non Sq Epi: 2 /HPF / Bacteria: x    PT/INR - ( 2021 21:35 )   PT: 11.6 sec;   INR: 1.01 ratio         PTT - ( 2021 21:35 )  PTT:27.2 sec    Lactate Trend      CAPILLARY BLOOD GLUCOSE  POCT Blood Glucose.: 172 mg/dL (2021 21:50)      < from: Xray Chest 1 View AP/PA (21 @ 22:46) >    EXAM:  XR CHEST AP OR PA 1V      PROCEDURE DATE:  2021     ******PRELIMINARY REPORT******    ******PRELIMINARY REPORT******          INTERPRETATION:  unchanged parenchymal opacities c/w previously characterized bronchiectasis. no focal consolidation.

## 2021-04-20 NOTE — H&P ADULT - NSRESEARCHGRANT_MLMHIDDEN_GEN_A_CORE
Notified pt of increased urobilinogen on UA and explained her LFTs and bili on CMP were normal. Recommended increasing fluid intake and rechecking in 1-2 months. Pt verbalized understanding. Lab ordered and appt scheduled.   yes

## 2021-04-20 NOTE — PROVIDER CONTACT NOTE (OTHER) - ASSESSMENT
Pt complains of headache 5/10, cough, SOB. Pt states cough and SOB is not new. Currently on 3L NC
0 = independent

## 2021-04-20 NOTE — H&P ADULT - PROBLEM SELECTOR PROBLEM 3
GERD (gastroesophageal reflux disease) Type 2 diabetes mellitus, with long-term current use of insulin

## 2021-04-20 NOTE — H&P ADULT - ATTENDING COMMENTS
66W  h/o diffuse cystitic bronchiectasis, primary ciliary dyskinesia, allergic bronchopulmonary aspergillosis, pseudomonas pneumonia, presents one day after discharge with complaints of weakness and black stools. Hgb stable, FOBT neg; low suspicion for significant GIB. Defer endoscopy to outpatient (as GI stated in assessment from prior admission).     Chronic medical issues appear to be at baseline. Patient recommended to go to [facility] last admission but went home instead. Likely needs [facility]. Pending PT re-evaluation.

## 2021-04-20 NOTE — H&P ADULT - NSHPREVIEWOFSYSTEMS_GEN_ALL_CORE
REVIEW OF SYSTEMS:  CONSTITUTIONAL: No fever, weight loss. (+) weakness  EYES: No eye pain, visual disturbances, or discharge  ENMT:  No difficulty hearing, tinnitus, vertigo. (+) throat pain  NECK: No pain or stiffness  RESPIRATORY: (+) cough, wheezing, SOB. No chills or hemoptysis.  CARDIOVASCULAR: No chest pain, palpitations, dizziness, or leg swelling  GASTROINTESTINAL: (+) abdominal pain. No nausea, vomiting, or hematemesis. (+) diarrhea. (+) black stools.  No melena or hematochezia.  GENITOURINARY: No dysuria, frequency, hematuria, or incontinence  NEUROLOGICAL: (+) weakness No headaches, memory loss, loss of strength, numbness, or tremors  SKIN: No itching, burning, rashes, or lesions   MUSCULOSKELETAL: No joint pain or swelling; No muscle, back, or extremity pain  PSYCHIATRIC: No depression, anxiety, mood swings, or difficulty sleeping  HEME/LYMPH: No easy bruising, or bleeding gums  ALLERGY AND IMMUNOLOGIC: No hives or eczema

## 2021-04-20 NOTE — H&P ADULT - PROBLEM SELECTOR PLAN 2
Mild hyponatremia likely due to poor PO intake and recent diarrhea. Serum sodium 132.    Check urine electrolytes/osmolarity and serum osmolarity   Continue to monitor Patient states that she has increased indigestion and endorses sore throat with sensation of throat and tongue swelling.   Patient seen on 4/8 by ENT for dysphagia. Fiberoptic indirect laryngoscopy demonstrated No Base of tongue masses, Epiglottis sharp, no masses, No edema, Vocal cords mobile with mild erythema.  Also seen by GI on recent admission, recommended outpatient EGD.  On omeprazole at home, continue with equivalent pantoprazole 40mg qd  benzocaine 20% spray topical TID PRN for sore throat

## 2021-04-20 NOTE — H&P ADULT - PROBLEM SELECTOR PLAN 5
Patient states that breathing had not improved since last admission with episodes of desatting to the 80s.  - consult pulmonology  - Continue with Symbicort BID, fluticasone, montelukast, Duonebs Q8H   -  prednisone 10mg daily as per pulm recs for h/o eosinophilia Patient states that breathing had not improved since last admission with episodes of desatting to the 80s.  - consult pulmonology for chronic cough and low O2  - Continue with Symbicort BID, fluticasone, montelukast, Duonebs Q8H   -  prednisone 10mg daily as per pulm recs  - tobramycin and azithromycin?? continue home dose of amlodipine

## 2021-04-20 NOTE — H&P ADULT - PROBLEM SELECTOR PLAN 1
Patient recently had 18 day hospital admission on 3/31 for sepsis due to pneumonia 2/2 carbapenem resistant pseudomonas.  Xray demonstrates unchanged parenchymal opacities c/w previously characterized bronchiectasis without focal consolidation. U/A not indicative of UTI.  White count 9.08.  Patient endorses poor PO intake secondary to early satiety as well as black diarrhea of 2 days duration.  Fecal occult negative.  - consult GI  - consult nutrition  - consult PT, OT, social work Patient recently had 18 day hospital admission on 3/31 for sepsis due to pneumonia 2/2 carbapenem resistant pseudomonas.  Xray demonstrates unchanged parenchymal opacities c/w previously characterized bronchiectasis without focal consolidation.  White count 9.08. H/H stable. U/A not indicative of UTI.   Patient endorses poor PO intake secondary to early satiety as well as black diarrhea of 2 days duration.  Fecal occult negative.  - consult GI for early satiety  - consult nutrition  - consult PT/social work, patient was recommended to be discharged to rehab upon last admission Patient recently had 18 day hospital admission on 3/31 for sepsis due to pneumonia 2/2 carbapenem resistant pseudomonas.  Xray demonstrates unchanged parenchymal opacities c/w previously characterized bronchiectasis without focal consolidation.  White count 9.08. H/H stable. U/A not indicative of UTI.   Patient endorses poor PO intake secondary to early satiety as well as black diarrhea of 2 days duration.  Fecal occult negative.  - consult PT/social work, patient was recommended to be discharged to rehab upon last admission

## 2021-04-21 ENCOUNTER — TRANSCRIPTION ENCOUNTER (OUTPATIENT)
Age: 66
End: 2021-04-21

## 2021-04-21 VITALS
RESPIRATION RATE: 18 BRPM | OXYGEN SATURATION: 99 % | SYSTOLIC BLOOD PRESSURE: 129 MMHG | TEMPERATURE: 98 F | HEART RATE: 98 BPM | DIASTOLIC BLOOD PRESSURE: 57 MMHG

## 2021-04-21 LAB
ANION GAP SERPL CALC-SCNC: 7 MMOL/L — SIGNIFICANT CHANGE UP (ref 7–14)
BUN SERPL-MCNC: 10 MG/DL — SIGNIFICANT CHANGE UP (ref 7–23)
CALCIUM SERPL-MCNC: 9.6 MG/DL — SIGNIFICANT CHANGE UP (ref 8.4–10.5)
CHLORIDE SERPL-SCNC: 94 MMOL/L — LOW (ref 98–107)
CO2 SERPL-SCNC: 32 MMOL/L — HIGH (ref 22–31)
COVID-19 SPIKE DOMAIN AB INTERP: NEGATIVE — SIGNIFICANT CHANGE UP
COVID-19 SPIKE DOMAIN ANTIBODY RESULT: 0.4 U/ML — SIGNIFICANT CHANGE UP
CREAT SERPL-MCNC: 0.39 MG/DL — LOW (ref 0.5–1.3)
CULTURE RESULTS: SIGNIFICANT CHANGE UP
GLUCOSE BLDC GLUCOMTR-MCNC: 119 MG/DL — HIGH (ref 70–99)
GLUCOSE BLDC GLUCOMTR-MCNC: 207 MG/DL — HIGH (ref 70–99)
GLUCOSE BLDC GLUCOMTR-MCNC: 264 MG/DL — HIGH (ref 70–99)
GLUCOSE SERPL-MCNC: 215 MG/DL — HIGH (ref 70–99)
HCT VFR BLD CALC: 39.9 % — SIGNIFICANT CHANGE UP (ref 34.5–45)
HGB BLD-MCNC: 12.7 G/DL — SIGNIFICANT CHANGE UP (ref 11.5–15.5)
MAGNESIUM SERPL-MCNC: 1.8 MG/DL — SIGNIFICANT CHANGE UP (ref 1.6–2.6)
MCHC RBC-ENTMCNC: 27.9 PG — SIGNIFICANT CHANGE UP (ref 27–34)
MCHC RBC-ENTMCNC: 31.8 GM/DL — LOW (ref 32–36)
MCV RBC AUTO: 87.7 FL — SIGNIFICANT CHANGE UP (ref 80–100)
NRBC # BLD: 0 /100 WBCS — SIGNIFICANT CHANGE UP
NRBC # FLD: 0 K/UL — SIGNIFICANT CHANGE UP
PHOSPHATE SERPL-MCNC: 2.7 MG/DL — SIGNIFICANT CHANGE UP (ref 2.5–4.5)
PLATELET # BLD AUTO: 198 K/UL — SIGNIFICANT CHANGE UP (ref 150–400)
POTASSIUM SERPL-MCNC: 4 MMOL/L — SIGNIFICANT CHANGE UP (ref 3.5–5.3)
POTASSIUM SERPL-SCNC: 4 MMOL/L — SIGNIFICANT CHANGE UP (ref 3.5–5.3)
RBC # BLD: 4.55 M/UL — SIGNIFICANT CHANGE UP (ref 3.8–5.2)
RBC # FLD: 13.3 % — SIGNIFICANT CHANGE UP (ref 10.3–14.5)
SARS-COV-2 IGG+IGM SERPL QL IA: 0.4 U/ML — SIGNIFICANT CHANGE UP
SARS-COV-2 IGG+IGM SERPL QL IA: NEGATIVE — SIGNIFICANT CHANGE UP
SODIUM SERPL-SCNC: 133 MMOL/L — LOW (ref 135–145)
SPECIMEN SOURCE: SIGNIFICANT CHANGE UP
WBC # BLD: 8.42 K/UL — SIGNIFICANT CHANGE UP (ref 3.8–10.5)
WBC # FLD AUTO: 8.42 K/UL — SIGNIFICANT CHANGE UP (ref 3.8–10.5)

## 2021-04-21 PROCEDURE — 99255 IP/OBS CONSLTJ NEW/EST HI 80: CPT | Mod: GC

## 2021-04-21 PROCEDURE — 99232 SBSQ HOSP IP/OBS MODERATE 35: CPT

## 2021-04-21 RX ORDER — AZITHROMYCIN 500 MG/1
1 TABLET, FILM COATED ORAL
Qty: 18 | Refills: 0
Start: 2021-04-21

## 2021-04-21 RX ORDER — AZITHROMYCIN 500 MG/1
1 TABLET, FILM COATED ORAL
Qty: 0 | Refills: 0 | DISCHARGE
Start: 2021-04-21

## 2021-04-21 RX ADMIN — Medication 100 MILLIGRAM(S): at 07:28

## 2021-04-21 RX ADMIN — Medication 37.5 MILLIGRAM(S): at 14:19

## 2021-04-21 RX ADMIN — Medication 500 MILLIGRAM(S): at 12:32

## 2021-04-21 RX ADMIN — Medication 3 UNIT(S): at 18:00

## 2021-04-21 RX ADMIN — Medication 100 MILLIGRAM(S): at 15:00

## 2021-04-21 RX ADMIN — AMLODIPINE BESYLATE 5 MILLIGRAM(S): 2.5 TABLET ORAL at 07:28

## 2021-04-21 RX ADMIN — MONTELUKAST 10 MILLIGRAM(S): 4 TABLET, CHEWABLE ORAL at 13:57

## 2021-04-21 RX ADMIN — Medication 1 TABLET(S): at 12:32

## 2021-04-21 RX ADMIN — Medication 3 MILLILITER(S): at 14:25

## 2021-04-21 RX ADMIN — PREGABALIN 1000 MICROGRAM(S): 225 CAPSULE ORAL at 13:56

## 2021-04-21 RX ADMIN — Medication 1 SPRAY(S): at 14:18

## 2021-04-21 RX ADMIN — Medication 325 MILLIGRAM(S): at 13:57

## 2021-04-21 RX ADMIN — Medication 3 MILLILITER(S): at 07:18

## 2021-04-21 RX ADMIN — TIOTROPIUM BROMIDE 1 CAPSULE(S): 18 CAPSULE ORAL; RESPIRATORY (INHALATION) at 09:58

## 2021-04-21 RX ADMIN — Medication 2: at 13:22

## 2021-04-21 RX ADMIN — Medication 3 UNIT(S): at 13:23

## 2021-04-21 RX ADMIN — Medication 3 UNIT(S): at 08:44

## 2021-04-21 RX ADMIN — AZITHROMYCIN 250 MILLIGRAM(S): 500 TABLET, FILM COATED ORAL at 07:29

## 2021-04-21 RX ADMIN — HEPARIN SODIUM 5000 UNIT(S): 5000 INJECTION INTRAVENOUS; SUBCUTANEOUS at 07:28

## 2021-04-21 RX ADMIN — PANTOPRAZOLE SODIUM 40 MILLIGRAM(S): 20 TABLET, DELAYED RELEASE ORAL at 07:28

## 2021-04-21 RX ADMIN — Medication 300 MILLIGRAM(S): at 14:25

## 2021-04-21 RX ADMIN — Medication 100 MILLIGRAM(S): at 12:32

## 2021-04-21 RX ADMIN — BUDESONIDE AND FORMOTEROL FUMARATE DIHYDRATE 2 PUFF(S): 160; 4.5 AEROSOL RESPIRATORY (INHALATION) at 08:50

## 2021-04-21 RX ADMIN — Medication 3: at 08:46

## 2021-04-21 RX ADMIN — Medication 10 MILLIGRAM(S): at 07:28

## 2021-04-21 NOTE — PHYSICAL THERAPY INITIAL EVALUATION ADULT - PERTINENT HX OF CURRENT PROBLEM, REHAB EVAL
This is a 66 female with a history of diffuse cystitic bronchiectasis, primary ciliary dyskinesia, presents with weakness and black stools

## 2021-04-21 NOTE — CONSULT NOTE ADULT - ATTENDING COMMENTS
Primary ciliary dyskinesia, cystic bronchiectasis, PCD admitted with weakness in setting of GI bleed.  From a pulmonary standpoint, she is at baseline.  Continue airway clearance, incentive spirometry as above.

## 2021-04-21 NOTE — CONSULT NOTE ADULT - ASSESSMENT
65 yo F PMH cystic bronchiectasis, PCD, prior pseudomonas , stenotrophomonas pna, HTN, and T2DM presenting for subacute worsening shortness of breath and fevers c/f pna    # Cystic bronchiectasis 2/2 to PCD,  # Chronic pseudomass infection  # Wheezing  - Clinically stable doing well. Sputum and wheezing is chronic. s/p treatment for resistant PA  - Can C/W predniosone 10mg daily.   - C/W Symbicort BID  - c/w fluticasone  - c/w montelukast and spiriva.   - c/w inhaled tobramycin and azithromycin  - Hypersal nebulized TID with pre-treatment with duonebs, Chest PT. Aerobika for secretion   - OOB, incentive josse    - On discharge patient will need follow up with Dr. Young. Needs outpatient pulmonary follow up upon discharge at [address] [place] [address], [address] ([phone]).     Dae Hyeon Kim MD-[id number]  Pulmonary Critical Care Fellow  Pager [phone]   67 yo F PMH cystic bronchiectasis, PCD, prior pseudomonas , Stenotrophomonas pna, hemophilus  HTN, and T2DM presenting for weakness.,     # Cystic bronchiectasis 2/2 to PCD,  # Chronic pseudomass infection  # Wheezing  - Clinically stable doing well. Sputum and wheezing is chronic. s/p treatment for resistant PA  - Can C/W predniosone 10mg daily.   - C/W Symbicort BID  - c/w fluticasone  - c/w montelukast and spiriva.   - c/w inhaled tobramycin and azithromycin  - Hypersal nebulized TID with pre-treatment with duonebs, Chest PT. Aerobika for secretion   - OOB, incentive josse    - On discharge patient will need follow up with Dr. Young. Needs outpatient pulmonary follow up upon discharge at [address] [place] [address], [address] ([phone]).     Dae Hyeon Kim MD-[id number]  Pulmonary Critical Care Fellow  Pager [phone]

## 2021-04-21 NOTE — DISCHARGE NOTE NURSING/CASE MANAGEMENT/SOCIAL WORK - PATIENT PORTAL LINK FT
You can access the FollowMyHealth Patient Portal offered by [facility] by registering at the following website: [email or web address]. By joining [employer]’s FollowMyHealth portal, you will also be able to view your health information using other applications (apps) compatible with our system.

## 2021-04-21 NOTE — PROGRESS NOTE ADULT - PROBLEM SELECTOR PLAN 4
- Continue with Symbicort BID, fluticasone, montelukast, Duonebs Q8H   - Continue prednisone 10mg daily as per previous pulm recs  - Continue tobramycin and azithromycin as per previous pulm rec  - continue to monitor O2 saturation, consult pulm if worsening

## 2021-04-21 NOTE — DISCHARGE NOTE PROVIDER - NSDCMRMEDTOKEN_GEN_ALL_CORE_FT
Admelog SoloStar 100 units/mL injectable solution: 5 unit(s) injectable before breakfast, 3 units before lunch and 3 units before dinner.   amLODIPine 5 mg oral tablet: 1 tab(s) orally once a day  azithromycin 250 mg oral tablet: 1 tab(s) orally once a day on Mondays, Wednesdays, Fridays  Basaglar KwikPen 100 units/mL subcutaneous solution: 14 unit(s) subcutaneous once a day (at bedtime)   benzonatate 100 mg oral capsule: 1 cap(s) orally 3 times a day  bisacodyl 5 mg oral delayed release tablet: 1 tab(s) orally 2 times a day, As Needed  budesonide-formoterol 160 mcg-4.5 mcg/inh inhalation aerosol: 2 puff(s) inhaled 2 times a day   Calcium 600+D oral tablet: 1 tab(s) orally 2 times a day  cyanocobalamin 1000 mcg/mL injectable solution: 1000 microgram(s) injectable every 7 days on Wednesday  ferrous gluconate 324 mg (37.5 mg elemental iron) oral tablet: 1 tab(s) orally 2 times a day  Flonase 50 mcg/inh nasal spray: 1 spray(s) in each nostril once a day  metFORMIN 1000 mg oral tablet: 1 tab(s) orally 2 times a day  montelukast 10 mg oral tablet: 1 tab(s) orally once a day  NebuSal: 4 milliliter(s) inhaled 2 times a day  omeprazole 20 mg oral delayed release capsule: 1 cap(s) orally once a day  predniSONE 10 mg oral tablet: 1 tab(s) orally once a day  Senna 8.6 mg oral tablet: 1 tab(s) orally 2 times a day  tiotropium 18 mcg inhalation capsule: 1 cap(s) inhaled once a day  tobramycin 75 mg/mL inhalation solution: 4 milliliter(s) inhaled 2 times a day  venlafaxine 37.5 mg oral capsule, extended release: 1 cap(s) orally once a day  Vitamin C 500 mg oral tablet: 1 tab(s) orally once a day

## 2021-04-21 NOTE — PROGRESS NOTE ADULT - ASSESSMENT
66F with a history of diffuse cystitic bronchiectasis, primary ciliary dyskinesia, pseudomonas pneumonia, HTN, DM2 (on home insulin) that presented with weakness and black stools- guaiac neg hgb stable pt w/ black stools d/t iron supplementation  stable for dc today time 40min

## 2021-04-21 NOTE — DISCHARGE NOTE PROVIDER - NSDCCPCAREPLAN_GEN_ALL_CORE_FT
PRINCIPAL DISCHARGE DIAGNOSIS  Diagnosis: Generalized weakness  Assessment and Plan of Treatment: follow up with your [facility] within 1 week of discharge, call to schedule an appointment      SECONDARY DISCHARGE DIAGNOSES  Diagnosis: Type 2 diabetes mellitus, with long-term current use of insulin  Assessment and Plan of Treatment: Continue your medication regimen and a consistent carbohydrate diet (Meaning eating the same amount of carbohydrates at the same time each day). Monitor blood glucose levels throughout the day before meals and at bedtime. Record blood sugars and bring to outpatient providers appointment in order to be reviewed by your doctor for management modifications. If your sugars are more than 400 or less than 70 you should contact your PCP immediately. Monitor for signs/symptoms of low blood glucose, such as, dizziness, altered mental status, or cool/clammy skin. In addition, monitor for signs/symptoms of high blood glucose, such as, feeling hot, dry, fatigued, or with increased thirst/urination. Make regular podiatry appointments in order to have feet checked for wounds and uncontrolled toe nail growth to prevent infections, as well as, appointments with an ophthalmologist to monitor your vision.      Diagnosis: Hypertension  Assessment and Plan of Treatment: Continue blood pressure medication regimen as directed. Monitor for any visual changes, headaches or dizziness.  Monitor blood pressure regularly.  Follow up with your primary care provider for further management for high blood pressure.      Diagnosis: Bronchiectasis without complication  Assessment and Plan of Treatment: follow up with Pulmonary, You already have a scheduled appointment on 5/7/21 at 2:30pm    Diagnosis: GERD (gastroesophageal reflux disease)  Assessment and Plan of Treatment: GERD (gastroesophageal reflux disease)

## 2021-04-21 NOTE — DISCHARGE NOTE PROVIDER - HOSPITAL COURSE
Patient is a 67 y/o, Female, with a history of diffuse cystitic bronchiectasis, primary ciliary dyskinesia, allergic bronchopulmonary aspergillosis, pseudomonas pneumonia, HTN, DM2 (on home insulin), recently admitted on 3/31/21-4/18/21 for treatement of Pneumonia and discharged, that presents with weakness and black stools. Labs stable, Stool occult neg, no evidence of GI bleed. Black stool likely due to pt being on Iron tab BID. Weakness is 2/2 to deconditioning due to long stay with prior admission, Pt was recommended for Rehab and declined. PT re-eval done, Pt interested in Home PT.     Pt is medically stable and cleared for discharge. Patient is a 65 y/o, Female, with a history of diffuse cystitic bronchiectasis, primary ciliary dyskinesia, allergic bronchopulmonary aspergillosis, pseudomonas pneumonia, HTN, DM2 (on home insulin), recently admitted on 3/31/21-4/18/21 for treatement of Pneumonia and discharged, that presents with weakness and black stools. Labs stable, Stool occult neg, no evidence of GI bleed. Black stool likely due to pt being on Iron tab BID. Weakness is 2/2 to deconditioning due to long stay with prior admission, Pt was recommended for Rehab and declined. PT re-eval done, Pt interested in Home PT.     Pt is medically stable and cleared for discharge.   told pt's dgtr to lower iron to daily

## 2021-04-21 NOTE — CONSULT NOTE ADULT - SUBJECTIVE AND OBJECTIVE BOX
CHIEF COMPLAINT: weakness     HPI:  65 yo F PMH cystic bronchiectasis, PCD, prior pseudomonas , Stenotrophomonas pna, hemophilus  HTN, and T2DM presenting for weakness.,   Patient readmitted for weakness and black stool. Work up negative to date. Patient was recently admitted to [facility] with PNA with resistent PA s/p Avycaz x 7 days. Patient currently stable, doing well, on 10mg prednisone. Has chronic sputum, sob, sats 100 on 2L NC. No additional complaints today.      PAST MEDICAL & SURGICAL HISTORY:  ABPA (allergic bronchopulmonary aspergillosis)    Bronchiectasis    Hypertension    Vitamin D deficiency    Microcytic anemia    GERD (gastroesophageal reflux disease)    Postnasal drip    Pseudomonas aeruginosa colonization    Allergic rhinitis    Recurrent pneumonia    Type 2 diabetes mellitus, with long-term current use of insulin    History of cholecystectomy        FAMILY HISTORY:  Family history of diabetes mellitus  father    Family history of allergic rhinitis (Child, Child)  son, daughter    Family history of bronchitis  parents        SOCIAL HISTORY:  Smoking: [ x] Never Smoked [ ] Former Smoker (__ packs x ___ years) [ ] Current Smoker  (__ packs x ___ years)  Substance Use: [x ] Never Used [ ] Used ____  EtOH Use: None      Allergies    No Known Allergies    Intolerances        HOME MEDICATIONS:  Home Medications:  amLODIPine 5 mg oral tablet: 1 tab(s) orally once a day (2021 14:24)  benzonatate 100 mg oral capsule: 1 cap(s) orally 3 times a day (2021 14:24)  bisacodyl 5 mg oral delayed release tablet: 1 tab(s) orally 2 times a day, As Needed (2021 14:24)  Calcium 600+D oral tablet: 1 tab(s) orally 2 times a day (2021 14:24)  cyanocobalamin 1000 mcg/mL injectable solution: 1000 microgram(s) injectable every 7 days on Wednesday (2021 14:24)  ferrous gluconate 324 mg (37.5 mg elemental iron) oral tablet: 1 tab(s) orally 2 times a day (2021 14:24)  Flonase 50 mcg/inh nasal spray: 1 spray(s) in each nostril once a day (2021 14:24)  metFORMIN 1000 mg oral tablet: 1 tab(s) orally 2 times a day (2021 14:24)  montelukast 10 mg oral tablet: 1 tab(s) orally once a day (2021 14:24)  NebuSal: 4 milliliter(s) inhaled 2 times a day (2021 14:24)  omeprazole 20 mg oral delayed release capsule: 1 cap(s) orally once a day (2021 14:24)  Senna 8.6 mg oral tablet: 1 tab(s) orally 2 times a day (2021 14:24)  tobramycin 75 mg/mL inhalation solution: 4 milliliter(s) inhaled 2 times a day (2021 14:24)  Vitamin C 500 mg oral tablet: 1 tab(s) orally once a day (2021 14:24)      REVIEW OF SYSTEMS:  Constitutional: [x ] negative [ ] fevers [ ] chills [ ] weight loss [ ] weight gain  HEENT: [x ] negative [ ] dry eyes [ ] eye irritation [ ] postnasal drip [ ] nasal congestion  CV: [ x] negative  [ ] chest pain [ ] orthopnea [ ] palpitations [ ] murmur  Resp: [ ] negative [ x] cough [ ] shortness of breath [ ] dyspnea [ ] wheezing [x] sputum [ ] hemoptysis  GI: [ x] negative [ ] nausea [ ] vomiting [ ] diarrhea [ ] constipation [ ] abd pain [ ] dysphagia   : [x ] negative [ ] dysuria [ ] nocturia [ ] hematuria [ ] increased urinary frequency  Musculoskeletal: [x ] negative [ ] back pain [ ] myalgias [ ] arthralgias [ ] fracture  Skin: [x ] negative [ ] rash [ ] itch  Neurological: [x ] negative [ ] headache [ ] dizziness [ ] syncope [ ] weakness [ ] numbness  Psychiatric: [x ] negative [ ] anxiety [ ] depression  Endocrine: [ ] negative [ ] diabetes [ ] thyroid problem  Hematologic/Lymphatic: [ x] negative [ ] anemia [ ] bleeding problem  Allergic/Immunologic: [x ] negative [ ] itchy eyes [ ] nasal discharge [ ] hives [ ] angioedema  [ ] All other systems negative  [ ] Unable to assess ROS because ________    OBJECTIVE:  ICU Vital Signs Last 24 Hrs  T(C): 36.8 (2021 15:00), Max: 36.8 (2021 15:00)  T(F): 98.2 (2021 15:00), Max: 98.2 (2021 15:00)  HR: 95 (2021 15:00) (88 - 95)  BP: 131/65 (2021 15:00) (131/65 - 143/63)  BP(mean): --  ABP: --  ABP(mean): --  RR: 18 (2021 15:00) (18 - 18)  SpO2: 100% (2021 15:00) (98% - 100%)        CAPILLARY BLOOD GLUCOSE      POCT Blood Glucose.: 119 mg/dL (2021 17:51)      PHYSICAL EXAM:  GENERAL: NAD, well-groomed, well-developed  HEAD:  Atraumatic, Normocephalic  EYES: EOMI, conjunctiva and sclera clear  NERVOUS SYSTEM:  Alert & Oriented X3, Good concentration; Moves all extremities  CHEST/LUNG: b/l Rhonchi, +and exp wheezing.   HEART: Regular rate and rhythm; No murmurs, rubs, or gallops  ABDOMEN: Soft, Nontender, Nondistended;   EXTREMITIES:  2+ Peripheral Pulses, No LE edema  SKIN: No rashes or lesions      LINES:     HOSPITAL MEDICATIONS:  Standing Meds:  albuterol/ipratropium for Nebulization 3 milliLiter(s) Nebulizer every 8 hours  amLODIPine   Tablet 5 milliGRAM(s) Oral daily  ascorbic acid 500 milliGRAM(s) Oral daily  azithromycin   Tablet 250 milliGRAM(s) Oral <User Schedule>  benzonatate 100 milliGRAM(s) Oral three times a day  budesonide 160 MICROgram(s)/formoterol 4.5 MICROgram(s) Inhaler 2 Puff(s) Inhalation two times a day  calcium carbonate 1250 mG  + Vitamin D (OsCal 500 + D) 1 Tablet(s) Oral daily  cyanocobalamin Injection - Peds 1000 MICROGram(s) IntraMuscular <User Schedule>  dextrose 40% Gel 15 Gram(s) Oral once  dextrose 5%. 1000 milliLiter(s) IV Continuous <Continuous>  dextrose 5%. 1000 milliLiter(s) IV Continuous <Continuous>  dextrose 50% Injectable 25 Gram(s) IV Push once  dextrose 50% Injectable 12.5 Gram(s) IV Push once  dextrose 50% Injectable 25 Gram(s) IV Push once  ferrous sulfate Oral Tab/Cap - Peds 325 milliGRAM(s) Oral daily  fluticasone propionate (50 MICROgram(s)/actuation) Nasal Spray - Peds 1 Spray(s) Both Nostrils daily  glucagon  Injectable 1 milliGRAM(s) IntraMuscular once  heparin   Injectable 5000 Unit(s) SubCutaneous every 12 hours  insulin glargine Injectable (LANTUS) 10 Unit(s) SubCutaneous at bedtime  insulin lispro (ADMELOG) corrective regimen sliding scale   SubCutaneous three times a day before meals  insulin lispro (ADMELOG) corrective regimen sliding scale   SubCutaneous at bedtime  insulin lispro Injectable (ADMELOG) 3 Unit(s) SubCutaneous three times a day before meals  montelukast 10 milliGRAM(s) Oral daily  pantoprazole    Tablet 40 milliGRAM(s) Oral before breakfast  predniSONE   Tablet 10 milliGRAM(s) Oral daily  tiotropium 18 MICROgram(s) Capsule 1 Capsule(s) Inhalation daily  tobramycin for Nebulization 300 milliGRAM(s) Inhalation every 12 hours  venlafaxine XR. 37.5 milliGRAM(s) Oral daily      PRN Meds:  benzocaine 20% Spray 1 Spray(s) Topical three times a day PRN  guaiFENesin   Syrup  (Sugar-Free) 100 milliGRAM(s) Oral every 6 hours PRN      LABS:                        12.7   8.42  )-----------( 198      ( 2021 06:19 )             39.9     Hgb Trend: 12.7<--, 12.8<--, 12.7<--, 11.8<--  04    133<L>  |  94<L>  |  10  ----------------------------<  215<H>  4.0   |  32<H>  |  0.39<L>    Ca    9.6      2021 06:19  Phos  2.7       Mg     1.8         TPro  7.4  /  Alb  3.6  /  TBili  0.3  /  DBili  x   /  AST  56<H>  /  ALT  61<H>  /  AlkPhos  238<H>      Creatinine Trend: 0.39<--, 0.39<--, 0.39<--, 0.39<--, 0.42<--, 0.50<--  PT/INR - ( 2021 21:35 )   PT: 11.6 sec;   INR: 1.01 ratio         PTT - ( 2021 21:35 )  PTT:27.2 sec  Urinalysis Basic - ( 2021 23:43 )    Color: Light Yellow / Appearance: Clear / S.008 / pH: x  Gluc: x / Ketone: Negative  / Bili: Negative / Urobili: <2 mg/dL   Blood: x / Protein: Trace / Nitrite: Negative   Leuk Esterase: Negative / RBC: 1 /HPF / WBC 1 /HPF   Sq Epi: x / Non Sq Epi: 2 /HPF / Bacteria: x            MICROBIOLOGY:     Culture - Urine (collected 2021 22:34)  Source: .Urine Clean Catch (Midstream)  Final Report (2021 00:04):    <10,000 CFU/mL Normal Urogenital Denise        RADIOLOGY:  [ ] Reviewed and interpreted by me    PULMONARY FUNCTION TESTS:    EKG:

## 2021-04-21 NOTE — PROGRESS NOTE ADULT - PROBLEM SELECTOR PLAN 2
Patient states that she has increased indigestion and endorses sore throat with sensation of throat and tongue swelling.  per dgtr pt has had this for a long time  Patient seen on 4/8 by ENT for dysphagia. Fiberoptic indirect laryngoscopy demonstrated No Base of tongue masses, Epiglottis sharp, no masses, No edema, Vocal cords mobile with mild erythema.  Also seen by GI on recent admission, recommended outpatient EGD.  On omeprazole at home,

## 2021-04-21 NOTE — PROGRESS NOTE ADULT - PROBLEM SELECTOR PLAN 3
Per pt, on Basaglar 10u qhs and Admelog 2u qAC at home.  As per daughter, recent BG readings have been low likely secondary to poor PO intake  - soft consistent carbohydrate diet   - lantus 10 units, and premeal 3 units tid  - cont to monitor and adjust insulin prn.

## 2021-04-21 NOTE — PROGRESS NOTE ADULT - SUBJECTIVE AND OBJECTIVE BOX
[facility] Division of Hospital Medicine  Shawnee Vidales MD  Pager [phone]    Patient is a 66y old  Female who presents with a chief complaint of Weakness, Black Stools (2021 18:05)      SUBJECTIVE / OVERNIGHT EVENTS: pt seen and examined at 1215p- writer spoke w/ pt in [place]- felt weak- dgtr then on the phone she was reassured that the black stools were non-bloody- said that her mom is at baseline w her cough and appetite.  explained that the weakness is from deconditioning from recent hospital stay.  pt will leave later today    reassured pt that the black stools were d/t iron supplementation- hgb stable  ADDITIONAL REVIEW OF SYSTEMS:    MEDICATIONS  (STANDING):  albuterol/ipratropium for Nebulization 3 milliLiter(s) Nebulizer every 8 hours  amLODIPine   Tablet 5 milliGRAM(s) Oral daily  ascorbic acid 500 milliGRAM(s) Oral daily  azithromycin   Tablet 250 milliGRAM(s) Oral <User Schedule>  benzonatate 100 milliGRAM(s) Oral three times a day  budesonide 160 MICROgram(s)/formoterol 4.5 MICROgram(s) Inhaler 2 Puff(s) Inhalation two times a day  calcium carbonate 1250 mG  + Vitamin D (OsCal 500 + D) 1 Tablet(s) Oral daily  cyanocobalamin Injection - Peds 1000 MICROGram(s) IntraMuscular <User Schedule>  dextrose 40% Gel 15 Gram(s) Oral once  dextrose 5%. 1000 milliLiter(s) (50 mL/Hr) IV Continuous <Continuous>  dextrose 5%. 1000 milliLiter(s) (100 mL/Hr) IV Continuous <Continuous>  dextrose 50% Injectable 25 Gram(s) IV Push once  dextrose 50% Injectable 12.5 Gram(s) IV Push once  dextrose 50% Injectable 25 Gram(s) IV Push once  ferrous sulfate Oral Tab/Cap - Peds 325 milliGRAM(s) Oral daily  fluticasone propionate (50 MICROgram(s)/actuation) Nasal Spray - Peds 1 Spray(s) Both Nostrils daily  glucagon  Injectable 1 milliGRAM(s) IntraMuscular once  heparin   Injectable 5000 Unit(s) SubCutaneous every 12 hours  insulin glargine Injectable (LANTUS) 10 Unit(s) SubCutaneous at bedtime  insulin lispro (ADMELOG) corrective regimen sliding scale   SubCutaneous three times a day before meals  insulin lispro (ADMELOG) corrective regimen sliding scale   SubCutaneous at bedtime  insulin lispro Injectable (ADMELOG) 3 Unit(s) SubCutaneous three times a day before meals  montelukast 10 milliGRAM(s) Oral daily  pantoprazole    Tablet 40 milliGRAM(s) Oral before breakfast  predniSONE   Tablet 10 milliGRAM(s) Oral daily  tiotropium 18 MICROgram(s) Capsule 1 Capsule(s) Inhalation daily  tobramycin for Nebulization 300 milliGRAM(s) Inhalation every 12 hours  venlafaxine XR. 37.5 milliGRAM(s) Oral daily    MEDICATIONS  (PRN):  benzocaine 20% Spray 1 Spray(s) Topical three times a day PRN sore throat  guaiFENesin   Syrup  (Sugar-Free) 100 milliGRAM(s) Oral every 6 hours PRN Cough      CAPILLARY BLOOD GLUCOSE      POCT Blood Glucose.: 119 mg/dL (2021 17:51)  POCT Blood Glucose.: 207 mg/dL (2021 12:30)  POCT Blood Glucose.: 264 mg/dL (2021 08:25)  POCT Blood Glucose.: 132 mg/dL (2021 22:24)    I&O's Summary      PHYSICAL EXAM:  Vital Signs Last 24 Hrs  T(C): 36.8 (2021 18:53), Max: 36.8 (2021 15:00)  T(F): 98.3 (2021 18:53), Max: 98.3 (2021 18:53)  HR: 98 (2021 18:53) (88 - 98)  BP: 129/57 (2021 18:53) (129/57 - 143/63)  BP(mean): --  RR: 18 (2021 18:53) (18 - 18)  SpO2: 99% (2021 18:53) (98% - 100%)  CONSTITUTIONAL: NAD, well-developed, well-groomed  EYES: conjunctiva and sclera clear  NECK: Supple  RESPIRATORY: Normal respiratory effort; lungs are clear to auscultation bilaterally  CARDIOVASCULAR: Regular rate and rhythm, normal S1 and S2, no murmur/rub/gallop; No lower extremity edema;   ABDOMEN: Nontender to palpation, normoactive bowel sounds, not distended;   MUSCULOSKELETAL:  no clubbing or cyanosis of digits; no joint swelling or tenderness to palpation      LABS:                        12.7   8.42  )-----------( 198      ( 2021 06:19 )             39.9     04-21    133<L>  |  94<L>  |  10  ----------------------------<  215<H>  4.0   |  32<H>  |  0.39<L>    Ca    9.6      2021 06:19  Phos  2.7     -  Mg     1.8     -    TPro  7.4  /  Alb  3.6  /  TBili  0.3  /  DBili  x   /  AST  56<H>  /  ALT  61<H>  /  AlkPhos  238<H>  04-20    PT/INR - ( 2021 21:35 )   PT: 11.6 sec;   INR: 1.01 ratio         PTT - ( 2021 21:35 )  PTT:27.2 sec      Urinalysis Basic - ( 2021 23:43 )    Color: Light Yellow / Appearance: Clear / S.008 / pH: x  Gluc: x / Ketone: Negative  / Bili: Negative / Urobili: <2 mg/dL   Blood: x / Protein: Trace / Nitrite: Negative   Leuk Esterase: Negative / RBC: 1 /HPF / WBC 1 /HPF   Sq Epi: x / Non Sq Epi: 2 /HPF / Bacteria: x        Culture - Urine (collected 2021 22:34)  Source: .Urine Clean Catch (Midstream)  Final Report (2021 00:04):    <10,000 CFU/mL Normal Urogenital Denise        RADIOLOGY & ADDITIONAL TESTS:  Results Reviewed:   Imaging Personally Reviewed:  Electrocardiogram Personally Reviewed:    COORDINATION OF CARE:  Care Discussed with Consultants/Other Providers [Y/N]:  Prior or Outpatient Records Reviewed [Y/N]:

## 2021-04-21 NOTE — PROGRESS NOTE ADULT - PROBLEM SELECTOR PLAN 1
Patient recently had 18 day hospital admission on 3/31 for sepsis due to pneumonia 2/2 carbapenem resistant pseudomonas.    deconditioned  no active gi bleed

## 2021-04-21 NOTE — DISCHARGE NOTE PROVIDER - CARE PROVIDER_API CALL
Sebastian Spence)  Critical Care Medicine; Pulmonary Disease  [address], [facility] [address]  [place], [place] [id number]  Phone: [phone]  Fax: [phone]  Follow Up Time:

## 2021-04-22 ENCOUNTER — NON-APPOINTMENT (OUTPATIENT)
Age: 66
End: 2021-04-22

## 2021-04-22 NOTE — DISCUSSION/SUMMARY
[FreeTextEntry1] : Pt admitted on 4/20/21 and discharged on 4/21/21 @ [facility]. Pt recently discharged from RX of pneumonia and discharged. Now complaining of weakness and black stools. Stool occult blood neg as per chart. Pt taking Iron. Pt refused rehab and wants PT @ home as per chart. Pt has PMH of hospice, DM2, Cystitic monchiestasis, primary ciliary dyskinesia, allergic bronch

## 2021-04-27 ENCOUNTER — APPOINTMENT (OUTPATIENT)
Dept: PULMONOLOGY | Facility: CLINIC | Age: 66
End: 2021-04-27

## 2021-04-27 ENCOUNTER — APPOINTMENT (OUTPATIENT)
Dept: PULMONOLOGY | Facility: CLINIC | Age: 66
End: 2021-04-27
Payer: MEDICAID

## 2021-04-27 DIAGNOSIS — R49.0 DYSPHONIA: ICD-10-CM

## 2021-04-27 PROCEDURE — 99214 OFFICE O/P EST MOD 30 MIN: CPT | Mod: 95

## 2021-04-27 RX ORDER — CLOTRIMAZOLE 10 MG/1
10 LOZENGE ORAL 3 TIMES DAILY
Qty: 50 | Refills: 0 | Status: ACTIVE | COMMUNITY
Start: 2021-04-27 | End: 1900-01-01

## 2021-04-27 NOTE — REASON FOR VISIT
[Initial] : an initial visit [Home] : at home, [unfilled] , at the time of the visit. [Medical Office: ([facility])___] : at the medical office located in  [Family Member] : family member [Verbal consent obtained from patient] : the patient, [unfilled]

## 2021-04-27 NOTE — HISTORY OF PRESENT ILLNESS
[TextBox_4] : Mrs. Sky was admitted to [facility] on 3/31/21-4/18/21 for fever, increased cough and sputum production.  Chest imaging showed apical ground glass opacity/ presumed pneumonia.  She was treated with inhaled Tobramycin and Avycaz for 10 days for  pseudomonas.  She had a positive Hep C screen.  Hepatology was consulted and diagnosed patient with cirrhosis - she has a nodular liver.  MRCP did not show evidence of mass.  \par \par She was COVID negative and antibody negative.  \par \par CT 3/31/21 showed bronchiectasis and tree in bud opacity, mosaic attenuation which were not changed compared with 7/31/18, but there was new GGO in the juan and left apex.  This was personally reviewed by me and compared to prior scans.  \par \par On 4/9/21 a modified barium swallow was negative.  \par \par Currently at home with daughter in law:  She is experiencing shortness of breath, cough, at times has difficulty expectorating sputum.  She feels very dry in the upper airway and is hoarse.  Daughter in law states that on occasion oxygen levels drop while she is walking.  She has audible wheezing at times.  \par \par Taking tobramycin twice daily inhaled.  IN addition\par Using Symbicort 160, twide daily ; also using budesonide and perforomist \par Spiriva daily.  \par On prednisone 10mg daily for an additional 10 days\par \par told not to use Albuterol by vns\par \par Appears well on video.  \par \par no fever,  but experiencing some shortness of breath.  \par At rest oxygen saturation is 98%,   on 2.5 l NC  while ambulating in the room oxygen remains 95%.

## 2021-04-27 NOTE — ASSESSMENT
[FreeTextEntry1] : Ms. Sky is a 66 year old woman with probable PCD, characterized by sinusitis, bronchiectasis and recurrent infections.  Has grown hemophilus influenza in January 2020 which was intermediate to Cipro. Recently hospitalized and treated for pseudomonas infection which was most recently isolated on sputum culture.  She is on a bronchodilator regimen and inhaled tobramycin.Most recently grew Pseudomonas, resistant to cipro but sensitive to Tobramycin.\par Acute televisit today - She is improving, but experiencing dryness in the upper airway and hoarseness, has cough and wheezing and at times difficulty expectorating sputum.  \par \par Oxygen saturation on 2.5 L NC at rest is 98% and with ambulating it is 95%\par \par I reviewed her airway clearance and bronchodilator regimen with the daughter in law as follows:  \par \par Symbicort 160 2 puffs twice daily\par Spiriva daily.\par \par Albuterol via nebulizer, followed by hypersal 3.5% and Aerobika valve twice daily\par Prednisone 10mg daily\par Advised nasal saline 3x per day for dryness\par prescribed mycelex lozenges 3x per day for presumed thrush.  \par Advised to stop tobramycin for now and to resume on 6/1/21\par \par Advised follow up in the office with me ASAP.

## 2021-05-04 ENCOUNTER — NON-APPOINTMENT (OUTPATIENT)
Age: 66
End: 2021-05-04

## 2021-05-05 ENCOUNTER — APPOINTMENT (OUTPATIENT)
Dept: INTERNAL MEDICINE | Facility: CLINIC | Age: 66
End: 2021-05-05

## 2021-05-05 ENCOUNTER — APPOINTMENT (OUTPATIENT)
Dept: PULMONOLOGY | Facility: CLINIC | Age: 66
End: 2021-05-05
Payer: MEDICAID

## 2021-05-05 VITALS
WEIGHT: 97 LBS | HEART RATE: 101 BPM | DIASTOLIC BLOOD PRESSURE: 71 MMHG | TEMPERATURE: 96.5 F | SYSTOLIC BLOOD PRESSURE: 138 MMHG | OXYGEN SATURATION: 96 % | BODY MASS INDEX: 17.19 KG/M2 | HEIGHT: 63 IN

## 2021-05-05 PROCEDURE — 99214 OFFICE O/P EST MOD 30 MIN: CPT

## 2021-05-05 RX ORDER — AZELASTINE HYDROCHLORIDE 137 UG/1
137 SPRAY, METERED NASAL TWICE DAILY
Qty: 1 | Refills: 5 | Status: ACTIVE | COMMUNITY
Start: 2019-05-20 | End: 1900-01-01

## 2021-05-05 RX ORDER — GUAIFENESIN 100 MG/5ML
100 LIQUID ORAL EVERY 4 HOURS
Qty: 1 | Refills: 0 | Status: ACTIVE | COMMUNITY
Start: 2021-05-05 | End: 1900-01-01

## 2021-05-05 NOTE — ASSESSMENT
[FreeTextEntry1] : Ms. Sky is a 66 year old woman with probable PCD, characterized by sinusitis, bronchiectasis and recurrent infections.  Has grown hemophilus influenza in January 2020 which was intermediate to Cipro. Recently hospitalized and treated for pseudomonas infection which was most recently isolated on sputum culture.  She is on a bronchodilator regimen and inhaled tobramycin.Most recently grew Pseudomonas, resistant to cipro but sensitive to Tobramycin.\par Current regimen reviewed with patient and includes: \par Symbicort 160 2 puffs twice daily\par Spiriva daily.\par Albuterol via nebulizer, followed by hypersal 3.5% and Aerobika valve twice daily\par Prednisone 10mg daily\par Advised nasal saline 3x per day for dryness-prescribed nasal gel \par Advised to resume tobramycin on 6/1/21\par \par On PE today, VSS.  Oxygen saturation at rest on 2 L NC is 96%  She has scattered rales on exam, no wheezing.\par Cor RRR, there is no edema.  \par \par Plan:\par 1- continue regimen detailed above\par 2- Glucerna twice daily ordered to supplement calories\par 3- I will repeat CT Chest in 2 months.  \par F/U in one month.

## 2021-05-05 NOTE — PHYSICAL EXAM
[No Acute Distress] : no acute distress [Normal Oropharynx] : normal oropharynx [Normal Appearance] : normal appearance [No Neck Mass] : no neck mass [Normal Rate/Rhythm] : normal rate/rhythm [Normal S1, S2] : normal s1, s2 [No Murmurs] : no murmurs [No Resp Distress] : no resp distress [No Abnormalities] : no abnormalities [Normal Gait] : normal gait [No Clubbing] : no clubbing [No Cyanosis] : no cyanosis [No Edema] : no edema [FROM] : FROM [Normal Color/ Pigmentation] : normal color/ pigmentation [No Focal Deficits] : no focal deficits [Oriented x3] : oriented x3 [Normal Affect] : normal affect [TextBox_11] : no thrush seen [TextBox_68] : scattered rales, coughing

## 2021-05-05 NOTE — HISTORY OF PRESENT ILLNESS
[TextBox_4] : Mrs. Sky was admitted to [facility] on 3/31/21-4/18/21 for fever, increased cough and sputum production.  Chest imaging showed apical ground glass opacity/ presumed pneumonia.  She was treated with inhaled Tobramycin and Avycaz for 10 days for  pseudomonas.  She had a positive Hep C screen.  Hepatology was consulted and diagnosed patient with cirrhosis - she has a nodular liver.  MRCP did not show evidence of mass.  \par \par She was COVID negative and antibody negative.  \par \par CT 3/31/21 showed bronchiectasis and tree in bud opacity, mosaic attenuation which were not changed compared with 7/31/18, but there was new GGO in the juan and left apex.  This was personally reviewed by me and compared to prior scans.  \par \par On 4/9/21 a modified barium swallow was negative.  \par \par Ihad a televisit with patient one week ago.  We reviewed all her medications and clarified her treatment regimen.  She had been using budesonide and perforomist as well as Symbicort twice daily.\par \par At rest oxygen saturation was 98%,   on 2.5 l NC  while ambulating in the room oxygen remains 95%.  \par \par Returns for follow up today accompanied by her son:\par \par She is feeling better, still coughing up dark yellow sputum.  She is using symbicort twice daily, spiriva, and albuterol, saline, followed by airway clearance valve.  Has not received Hypersal yet- has been ordered again through [facility].  She is off Tobramycin until 6/1/21.  She has lost 10 lbs.  Also complaining of dryness in upper airway and throat- wearing oxygen continuously.

## 2021-05-07 ENCOUNTER — APPOINTMENT (OUTPATIENT)
Dept: PULMONOLOGY | Facility: CLINIC | Age: 66
End: 2021-05-07

## 2021-06-03 ENCOUNTER — APPOINTMENT (OUTPATIENT)
Dept: PULMONOLOGY | Facility: CLINIC | Age: 66
End: 2021-06-03
Payer: MEDICAID

## 2021-06-03 PROCEDURE — 99214 OFFICE O/P EST MOD 30 MIN: CPT | Mod: 95

## 2021-06-03 NOTE — ASSESSMENT
[FreeTextEntry1] : Ms. Sky is a 66 year old woman with probable PCD, characterized by sinusitis, bronchiectasis and recurrent infections.  Has grown hemophilus influenza in January 2020 which was intermediate to Cipro. Recently hospitalized and treated for pseudomonas infection which was most recently isolated on sputum culture.  She is on a bronchodilator regimen and inhaled tobramycin.Most recently grew Pseudomonas, resistant to cipro but sensitive to Tobramycin.\par ON Televisit today she notes increased thickness of the sputum and difficulty clearing as well as desaturation at times related to sputum.  She is experiencing a chest pain with cough.  I believe she is having an exacerbation of her bronchiectasis.  \par Current regimen reviewed with patient and includes: \par Symbicort 160 2 puffs twice daily\par Spiriva daily.\par Albuterol via nebulizer, followed by hypersal 3.5% and Aerobika valve twice daily\par Prednisone 10mg daily\par Inhaled clau until 7/1/21.\par \par On PE today, VSS.  Oxygen saturation at rest on 2 L NC is 94% Walking around her home it is 93% on 2 L NC\par \par Plan:\par 1- continue regimen detailed above- in addtion advised that she add one additional session of airway clearance in the middle of the day with albuterol-hypersal and Aerobika valve\par 2- pseudomonas is sensitive to ceftazidime.  Will try cefdinit- an oral 3rd generation cephalosporin for 7 days to avert hospitalization.\par 3- Losing weight- lost an additional 5 lbs this month-Glucerna twice daily ordered to supplement calories as well as nuts, high calorie foods.  Will arrange a visit with our CF interdisciplinary team which will include nutrition.\par 4- F/U televisit next week.  \par  I will repeat CT Chest in 2 months.  \par

## 2021-06-03 NOTE — HISTORY OF PRESENT ILLNESS
[Home] : at home, [unfilled] , at the time of the visit. [Medical Office: ([facility])___] : at the medical office located in  [Family Member] : family member [Verbal consent obtained from patient] : the patient, [unfilled] [TextBox_4] : Mrs. Sky was admitted to [facility] on 3/31/21-4/18/21 for fever, increased cough and sputum production.  Chest imaging showed apical ground glass opacity/ presumed pneumonia.  She was treated with inhaled Tobramycin and Avycaz for 10 days for  pseudomonas.  She had a positive Hep C screen.  Hepatology was consulted and diagnosed patient with cirrhosis - she has a nodular liver.  MRCP did not show evidence of mass.  \par \par She was COVID negative and antibody negative.  \par \par CT 3/31/21 showed bronchiectasis and tree in bud opacity, mosaic attenuation which were not changed compared with 7/31/18, but there was new GGO in the juan and left apex.  This was personally reviewed by me and compared to prior scans.  \par \par On 4/9/21 a modified barium swallow was negative.  \par \par Seen last in the office on 5/5/21. She was feeling better, still coughing up dark yellow sputum.  She was wearing oxygen continuously, adherent toher inhaler regimen and was using clau.  Hypertonic saline was ordered again.  Told to hold tobramycin until June 21.  I ordered Glucerna as a nutritional supplement as she had lost 10 pounds during her hospitalization.\par \par Returns for follow-up today:\par Son is present at todays visit and is translating\par she is feeling a pain in her chest, pinching pain, worse when she coughs.  no fever, she feels increasingly short of breath. She has no pain now.  She is on 2 L NC oxygen saturation is 94% at rest at RA.  She states sputum is increasingly dark yellow and feels better when she can expectorate.  She is using CLAU now- she stopped it after our last visit and was off for one month.  \par Reviewed bronchodilator/airway clearance regimen which she is adherent to.  Did not take Glucerna even though i told her it was a diabetic formula.  She has lost an additional  5 lbs.  \par Sputum has grown mucin producing pseudomonas which is resistant to Cipro, intermediate to levaquin and sensitive to carbapenams, cefepime and aminoglycosides.  \par \par

## 2021-06-04 ENCOUNTER — RX RENEWAL (OUTPATIENT)
Age: 66
End: 2021-06-04

## 2021-06-09 RX ORDER — TOBRAMYCIN 300 MG/5ML
300 SOLUTION RESPIRATORY (INHALATION) TWICE DAILY
Qty: 60 | Refills: 3 | Status: ACTIVE | COMMUNITY
Start: 2020-07-16 | End: 1900-01-01

## 2021-06-10 ENCOUNTER — APPOINTMENT (OUTPATIENT)
Dept: PULMONOLOGY | Facility: CLINIC | Age: 66
End: 2021-06-10

## 2021-06-10 ENCOUNTER — APPOINTMENT (OUTPATIENT)
Dept: PULMONOLOGY | Facility: CLINIC | Age: 66
End: 2021-06-10
Payer: MEDICAID

## 2021-06-10 PROCEDURE — 99215 OFFICE O/P EST HI 40 MIN: CPT | Mod: 95

## 2021-06-10 RX ORDER — MIRTAZAPINE 7.5 MG/1
7.5 TABLET, FILM COATED ORAL
Qty: 30 | Refills: 0 | Status: ACTIVE | COMMUNITY
Start: 2021-06-10 | End: 1900-01-01

## 2021-06-10 RX ORDER — LEVOFLOXACIN 750 MG/1
750 TABLET, FILM COATED ORAL DAILY
Qty: 10 | Refills: 0 | Status: COMPLETED | COMMUNITY
Start: 2021-06-10 | End: 2021-06-20

## 2021-06-10 RX ORDER — PREDNISONE 5 MG/1
5 TABLET ORAL
Qty: 45 | Refills: 0 | Status: ACTIVE | COMMUNITY
Start: 2019-02-26 | End: 1900-01-01

## 2021-06-10 NOTE — HISTORY OF PRESENT ILLNESS
[TextBox_4] : Patient is a 65y/o F with 3 VUS for PCD, bronchiectasis and recurrent lung infections. Patient was recently hospitalized (3/31/21-4/18/21) for pseudomonas infection, now on inhaled tobramycin and ACT with albuterol/ HS3% and Aerobika. Patient was also started on Cefdinir last week for bronchiectasis exacerbation and advised to increased ACT to TID. On Prednisone 10mg daily. Presents today via tele for second opinion and f/u referred by Dr. Young. CF panel NEGATIVE for genetic mutations, duplications or deletions. \par \par Patient endorses no change in symptoms on day 4/7 of Cefdinir. Continues to have productive cough with yellow mucus, SOB and weakness/ dyspnea on exertion. Reports feeling like mucus is still “stuck” despite increased ACT. On 2L O2 24hrs/day with occasional increase to 2.5-3L. Patient reports using her PRN Ventolin HFA 4-5x/day. Endorses 3 kg wt loss reported in the past month. Denies fever, chills, hemoptysis, PND, sinus pain/congestion, chest pain, chest tightness, palpitations, LE swelling.\par \par PULM: \par ACT: Alb/HS3% and Aerobika  TID. Patient has had vest in the past, but did not tolerate (pain).\par On Symbicort 160 BID, Spiriva daily, Budesonide, Prednisone 10 mg. \par Prior to 1998, pt reports never having any health issues. Denies lung infections, hx of asthma, sinus infections, ear infections, or strep infections. “Was an energetic and hard working woman” In 1998 patient was hospitalized for jaundice and had gall bladder removed and subsequently developed PNA (in [place]). Since then, patient has had multiple PNA’s  requiring hospitalizations and episodes of gross hemoptysis requiring  IVABX. Feels that over the last several years her condition has progressively worsened with notable wt loss and pulmonary decline. Now dependent on steroids and around the clock O2 therapy. \par \par GI: hx of cholecystectomy in 1998. Hx of “gas issues” that started after a surgery to "stop having children" in 1986. No n/v/d/c bloating or abdominal pain. Reports normal BM's. Endorses difficulty gaining and maintaining weight since hospitalization in 1998. \par \par ENDO: DM since ~ 2003, on insulin. Reports symptomatic hypo/hyperglycemic events as low as 40 and as high as 400.  \par Osteoporosis diagnosed ~2007. Was treated with medication, does not recall name, now on Ca supplement. \par No thyroid issues. \par \par Family: No family hx of lung disease. 2 brothers and 2 sisters. Has 3 children all healthy. \par \par Social: No hx of smoking, drug use or alcohol use. \par \par GYN: No hx of miscarriages or difficulty getting pregnant.\par

## 2021-06-10 NOTE — END OF VISIT
[Time Spent: ___ minutes] : I have spent [unfilled] minutes of time on the encounter. [FreeTextEntry3] : I agree with the advanced clinical provider's history and plan of care. I personally elicited a history. See above attestation. Stop cefdinir, start Levaquin 750 mg daily x 10 days for persistent pulm exacerbation, \par Increased prednisone to 20mg x 5 days, 15 mg x 5, then back to 10 mg. But I asked them to log her FS, so if too hyperglycemic, may need to shorten prednisone.\par Started Remeron QHS for appetite stimulant\par Our nutritionist rec increased Glucerna to BID, we ordered it thru her insurance.\par She drops to 85% after showering, advised to increase O2 to 3-4 LPM prn.\par Could consider alternating 2 inhaled anti-PA antibiotics.\par REC 2-week f/u in person to get a more recent sputum culture.\par 45 minutes time spent for patient education related to comorbidities and medications, medical records/labs/radiology reviews, preventative care, documentation.\par \par

## 2021-06-10 NOTE — PHYSICAL EXAM
[TextBox_2] : Pt being seen via tele, notable supraclavicular and suprasternal retractions noted. +nasal flaring and increased WOB after coughing.

## 2021-06-10 NOTE — REVIEW OF SYSTEMS
[Poor Appetite] : poor appetite [Recent Wt Loss (___ Lbs)] : ~T recent [unfilled] lb weight loss [Cough] : cough [Chest Tightness] : chest tightness [Sputum] : sputum [Dyspnea] : dyspnea [Wheezing] : wheezing [SOB on Exertion] : sob on exertion [GERD] : gerd [Hepatic Disease] : hepatic disease [Diabetes] : diabetes [Negative] : Neurologic [Fever] : no fever [Chills] : no chills [Hemoptysis] : no hemoptysis [Frequent URIs] : no frequent URIs [Pleuritic Pain] : no pleuritic pain [Abdominal Pain] : no abdominal pain [Nausea] : no nausea [Vomiting] : no vomiting [Diarrhea] : no diarrhea [Constipation] : no constipation [Bleeding] : no bleeding [Food Intolerance] : no food intolerance [Thyroid Problem] : no thyroid problem [Obesity] : no obesity [TextBox_94] : osteoporosis [TextBox_69] : cirrhosis

## 2021-06-10 NOTE — ASSESSMENT
[FreeTextEntry1] : ATTENDING ATTESTATION\par \par Patient is a 67y/o F with 3VUS for PCD, bronchiectasis and recurrent lung infections. Patient was recently hospitalized (3/31/21-4/18/21) for pseudomonas infection, now on inhaled tobramycin and ACT with albuterol/ HS3% and Aerobika. Patient was also started on Cefdinir last week for bronchiectasis exacerbation and advised to increased ACT to TID. Presents today via tele for second opinion and f/u referred by Dr. Young. CF panel negative for genetic mutations, duplications or deletions. \par \par On day 4/7 of Cefdinir with no improvement in symptoms. Continues to have productive cough, and increased mucus production despite PO ABX, inhaled tobramycin and increased ACT. Using PRN Ventolin 4-5x/day. \par \par Bronchiectasis exacerbation:\par -Start: Levofloxacin 750mg daily x 10 days\par -Start: Prednisone 20mg x5, 15mg x 5,  10mg x 5 days. Educated patient about SE of hyperglycemia and that pt should monitor FSBG more frequently while on higher dose of prednisone. Notify provider if increase in FSBG is noted. \par -Continue: Inhaled Tobramycin. Can consider adding another inhaled antibiotic and alternating QOM. \par -Continue: ACT with Albuterol / HS3% / Aerobika TID\par -Continue: holding Azithromycin TIW\par -Advised patient continue monitoring O2 sats (at rest and with activity) and increase O2 to maintain sats > 92%\par \par Malnutrition: \par -Start: Mirtazapine 7.5 mg at bedtime to increase appetite. \par -Increase Glucerna supplementation to TID. \par -f/u with RD today.\par \par \par -Pt has tele f/u with Dr. Young tomorrow. \par \par \par \par \par

## 2021-06-11 ENCOUNTER — APPOINTMENT (OUTPATIENT)
Dept: PULMONOLOGY | Facility: CLINIC | Age: 66
End: 2021-06-11
Payer: MEDICAID

## 2021-06-11 PROCEDURE — 99214 OFFICE O/P EST MOD 30 MIN: CPT | Mod: 95

## 2021-06-14 NOTE — HISTORY OF PRESENT ILLNESS
[Home] : at home, [unfilled] , at the time of the visit. [Medical Office: ([facility])___] : at the medical office located in  [Family Member] : family member [Verbal consent obtained from patient] : the patient, [unfilled] [TextBox_4] : Mrs. Sky was admitted to [facility] on 3/31/21-4/18/21 for fever, increased cough and sputum production.  Chest imaging showed apical ground glass opacity/ presumed pneumonia.  She was treated with inhaled Tobramycin and Avycaz for 10 days for  pseudomonas.  She had a positive Hep C screen.  Hepatology was consulted and diagnosed patient with cirrhosis - she has a nodular liver.  MRCP did not show evidence of mass.  \par \par She was COVID negative and antibody negative.  \par \par CT 3/31/21 showed bronchiectasis and tree in bud opacity, mosaic attenuation which were not changed compared with 7/31/18, but there was new GGO in the juan and left apex.  This was personally reviewed by me and compared to prior scans.  \par \par On 4/9/21 a modified barium swallow was negative.  \par \par Seen last in the office on 6/3//21. She was adherent to her clearance regimen.  Has continued to lose weight and was not taking Glcuerna as I had recommended.  I had prescribed an oral 3rd generation cephalosporin to treat her pseudomonas.  It was intermediately sensitive to Levaquin.  I arranged for her to have a visit with Dr. Yin and CF team and thought she would benefit from input from our nutritionist. \par \par Dr. Yin prescribed high dose levaquin for 10 days, increased her prednisone, and recommended increasing airway clearance to TID.  She also prescribed mirtazapine for appetite and suggested inceasing Glucerna to TID.\par \par ON follow up televisit today she states that she is feeling better.  STill with no appetite and early satiety but she just started the mirtazapine.  She denies fever or sweats.  REviewed airway clearance with her.  \par \par \par \par

## 2021-06-14 NOTE — ASSESSMENT
[FreeTextEntry1] : Ms. Sky is a 66 year old woman with probable PCD, characterized by sinusitis, bronchiectasis and recurrent infections.  Has grown hemophilus influenza in January 2020 which was intermediate to Cipro. Recently hospitalized and treated for pseudomonas infection which was most recently isolated on sputum culture.  She is on a bronchodilator regimen and inhaled tobramycin.Most recently grew Pseudomonas, resistant to cipro but sensitive to Tobramycin.\par \par Recent exacerbation- now on Levaquin 750 daily for 10 days and prednisone with 20mg with 5 mg taper every 5 days.\par Also with thrush- seen on video today\par \par Current regimen reviewed with patient and includes: \par Symbicort 160 2 puffs twice daily\par Spiriva daily.\par Albuterol via nebulizer, followed by hypersal 3.5% and Aerobika valve twice daily\par Prednisone 10mg daily\par Inhaled clau until 7/1/21.\par \par Plan:\par 1- continue regimen detailed above- in addtion advised that she add one additional session of airway clearance in the middle of the day with albuterol-hypersal and Aerobika valve\par 2-continue levaquin, prednisone taper\par 3- mirtazapine qhs\par 4- glucerna twice dialy; encouraged to eat calorie dense foods to promote weight gain\par 5- mycelex alexis for thrush\par follow up with me in 2-3 weeks.

## 2021-06-14 NOTE — H&P ADULT - MINUTES
60 Dapsone Pregnancy And Lactation Text: This medication is Pregnancy Category C and is not considered safe during pregnancy or breast feeding.

## 2021-06-29 ENCOUNTER — APPOINTMENT (OUTPATIENT)
Dept: PULMONOLOGY | Facility: CLINIC | Age: 66
End: 2021-06-29
Payer: MEDICAID

## 2021-06-29 ENCOUNTER — INPATIENT (INPATIENT)
Facility: HOSPITAL | Age: 66
LOS: 19 days | Discharge: HOME HEALTH | End: 2021-07-19
Attending: STUDENT IN AN ORGANIZED HEALTH CARE EDUCATION/TRAINING PROGRAM | Admitting: STUDENT IN AN ORGANIZED HEALTH CARE EDUCATION/TRAINING PROGRAM
Payer: MEDICAID

## 2021-06-29 VITALS
HEIGHT: 65.5 IN | HEART RATE: 99 BPM | RESPIRATION RATE: 25 BRPM | SYSTOLIC BLOOD PRESSURE: 147 MMHG | TEMPERATURE: 98 F | DIASTOLIC BLOOD PRESSURE: 75 MMHG | OXYGEN SATURATION: 96 %

## 2021-06-29 VITALS
SYSTOLIC BLOOD PRESSURE: 167 MMHG | HEIGHT: 63 IN | WEIGHT: 96 LBS | TEMPERATURE: 98.2 F | OXYGEN SATURATION: 78 % | BODY MASS INDEX: 17.01 KG/M2 | DIASTOLIC BLOOD PRESSURE: 71 MMHG | HEART RATE: 98 BPM

## 2021-06-29 VITALS — OXYGEN SATURATION: 90 %

## 2021-06-29 DIAGNOSIS — Z90.49 ACQUIRED ABSENCE OF OTHER SPECIFIED PARTS OF DIGESTIVE TRACT: Chronic | ICD-10-CM

## 2021-06-29 DIAGNOSIS — Z29.9 ENCOUNTER FOR PROPHYLACTIC MEASURES, UNSPECIFIED: ICD-10-CM

## 2021-06-29 DIAGNOSIS — B44.81 ALLERGIC BRONCHOPULMONARY ASPERGILLOSIS: ICD-10-CM

## 2021-06-29 DIAGNOSIS — K21.9 GASTRO-ESOPHAGEAL REFLUX DISEASE WITHOUT ESOPHAGITIS: ICD-10-CM

## 2021-06-29 DIAGNOSIS — I10 ESSENTIAL (PRIMARY) HYPERTENSION: ICD-10-CM

## 2021-06-29 DIAGNOSIS — J44.0 CHRONIC OBSTRUCTIVE PULMONARY DISEASE WITH (ACUTE) LOWER RESPIRATORY INFECTION: ICD-10-CM

## 2021-06-29 DIAGNOSIS — J47.9 BRONCHIECTASIS, UNCOMPLICATED: ICD-10-CM

## 2021-06-29 DIAGNOSIS — E11.9 TYPE 2 DIABETES MELLITUS WITHOUT COMPLICATIONS: ICD-10-CM

## 2021-06-29 DIAGNOSIS — K74.60 UNSPECIFIED CIRRHOSIS OF LIVER: ICD-10-CM

## 2021-06-29 DIAGNOSIS — J96.02 ACUTE RESPIRATORY FAILURE WITH HYPERCAPNIA: ICD-10-CM

## 2021-06-29 LAB
ALBUMIN SERPL ELPH-MCNC: 3.8 G/DL — SIGNIFICANT CHANGE UP (ref 3.3–5)
ALP SERPL-CCNC: 169 U/L — HIGH (ref 40–120)
ALT FLD-CCNC: 34 U/L — HIGH (ref 4–33)
ANION GAP SERPL CALC-SCNC: 5 MMOL/L — LOW (ref 7–14)
AST SERPL-CCNC: 42 U/L — HIGH (ref 4–32)
BASE EXCESS BLDA CALC-SCNC: 14.9 MMOL/L — HIGH (ref -2–2)
BASOPHILS # BLD AUTO: 0.04 K/UL — SIGNIFICANT CHANGE UP (ref 0–0.2)
BASOPHILS NFR BLD AUTO: 0.4 % — SIGNIFICANT CHANGE UP (ref 0–2)
BILIRUB SERPL-MCNC: 0.3 MG/DL — SIGNIFICANT CHANGE UP (ref 0.2–1.2)
BLOOD GAS VENOUS COMPREHENSIVE RESULT: SIGNIFICANT CHANGE UP
BUN SERPL-MCNC: 10 MG/DL — SIGNIFICANT CHANGE UP (ref 7–23)
CALCIUM SERPL-MCNC: 9.5 MG/DL — SIGNIFICANT CHANGE UP (ref 8.4–10.5)
CHLORIDE SERPL-SCNC: 89 MMOL/L — LOW (ref 98–107)
CO2 SERPL-SCNC: 38 MMOL/L — HIGH (ref 22–31)
CREAT SERPL-MCNC: 0.37 MG/DL — LOW (ref 0.5–1.3)
EOSINOPHIL # BLD AUTO: 0.13 K/UL — SIGNIFICANT CHANGE UP (ref 0–0.5)
EOSINOPHIL NFR BLD AUTO: 1.2 % — SIGNIFICANT CHANGE UP (ref 0–6)
GLUCOSE BLDC GLUCOMTR-MCNC: 335 MG/DL — HIGH (ref 70–99)
GLUCOSE SERPL-MCNC: 260 MG/DL — HIGH (ref 70–99)
HCO3 BLDA-SCNC: 36 MMOL/L — HIGH (ref 22–26)
HCT VFR BLD CALC: 44.7 % — SIGNIFICANT CHANGE UP (ref 34.5–45)
HGB BLD-MCNC: 13 G/DL — SIGNIFICANT CHANGE UP (ref 11.5–15.5)
IANC: 7.74 K/UL — SIGNIFICANT CHANGE UP (ref 1.5–8.5)
IMM GRANULOCYTES NFR BLD AUTO: 0.4 % — SIGNIFICANT CHANGE UP (ref 0–1.5)
LYMPHOCYTES # BLD AUTO: 1.31 K/UL — SIGNIFICANT CHANGE UP (ref 1–3.3)
LYMPHOCYTES # BLD AUTO: 12.6 % — LOW (ref 13–44)
MCHC RBC-ENTMCNC: 27 PG — SIGNIFICANT CHANGE UP (ref 27–34)
MCHC RBC-ENTMCNC: 29.1 GM/DL — LOW (ref 32–36)
MCV RBC AUTO: 92.7 FL — SIGNIFICANT CHANGE UP (ref 80–100)
MONOCYTES # BLD AUTO: 1.17 K/UL — HIGH (ref 0–0.9)
MONOCYTES NFR BLD AUTO: 11.2 % — SIGNIFICANT CHANGE UP (ref 2–14)
NEUTROPHILS # BLD AUTO: 7.74 K/UL — HIGH (ref 1.8–7.4)
NEUTROPHILS NFR BLD AUTO: 74.2 % — SIGNIFICANT CHANGE UP (ref 43–77)
NRBC # BLD: 0 /100 WBCS — SIGNIFICANT CHANGE UP
NRBC # FLD: 0 K/UL — SIGNIFICANT CHANGE UP
NT-PROBNP SERPL-SCNC: 258 PG/ML — SIGNIFICANT CHANGE UP
PCO2 BLDA: 92 MMHG — CRITICAL HIGH (ref 32–48)
PH BLDA: 7.28 — LOW (ref 7.35–7.45)
PLATELET # BLD AUTO: 204 K/UL — SIGNIFICANT CHANGE UP (ref 150–400)
PO2 BLDA: 109 MMHG — HIGH (ref 83–108)
POTASSIUM SERPL-MCNC: 4.4 MMOL/L — SIGNIFICANT CHANGE UP (ref 3.5–5.3)
POTASSIUM SERPL-SCNC: 4.4 MMOL/L — SIGNIFICANT CHANGE UP (ref 3.5–5.3)
PROT SERPL-MCNC: 7.4 G/DL — SIGNIFICANT CHANGE UP (ref 6–8.3)
RAPID RVP RESULT: SIGNIFICANT CHANGE UP
RBC # BLD: 4.82 M/UL — SIGNIFICANT CHANGE UP (ref 3.8–5.2)
RBC # FLD: 13.2 % — SIGNIFICANT CHANGE UP (ref 10.3–14.5)
SAO2 % BLDA: 98.3 % — SIGNIFICANT CHANGE UP (ref 95–99)
SARS-COV-2 RNA SPEC QL NAA+PROBE: SIGNIFICANT CHANGE UP
SODIUM SERPL-SCNC: 132 MMOL/L — LOW (ref 135–145)
TROPONIN T, HIGH SENSITIVITY RESULT: 6 NG/L — SIGNIFICANT CHANGE UP
WBC # BLD: 10.43 K/UL — SIGNIFICANT CHANGE UP (ref 3.8–10.5)
WBC # FLD AUTO: 10.43 K/UL — SIGNIFICANT CHANGE UP (ref 3.8–10.5)

## 2021-06-29 PROCEDURE — 99223 1ST HOSP IP/OBS HIGH 75: CPT

## 2021-06-29 PROCEDURE — 71046 X-RAY EXAM CHEST 2 VIEWS: CPT | Mod: 26

## 2021-06-29 PROCEDURE — 99285 EMERGENCY DEPT VISIT HI MDM: CPT

## 2021-06-29 PROCEDURE — 99291 CRITICAL CARE FIRST HOUR: CPT

## 2021-06-29 PROCEDURE — 99215 OFFICE O/P EST HI 40 MIN: CPT

## 2021-06-29 RX ORDER — PANTOPRAZOLE SODIUM 20 MG/1
40 TABLET, DELAYED RELEASE ORAL
Refills: 0 | Status: DISCONTINUED | OUTPATIENT
Start: 2021-06-29 | End: 2021-07-19

## 2021-06-29 RX ORDER — ALBUTEROL 90 UG/1
1 AEROSOL, METERED ORAL ONCE
Refills: 0 | Status: COMPLETED | OUTPATIENT
Start: 2021-06-29 | End: 2021-06-29

## 2021-06-29 RX ORDER — ALBUTEROL 90 UG/1
2.5 AEROSOL, METERED ORAL EVERY 6 HOURS
Refills: 0 | Status: DISCONTINUED | OUTPATIENT
Start: 2021-06-29 | End: 2021-07-02

## 2021-06-29 RX ORDER — SODIUM CHLORIDE 9 MG/ML
1000 INJECTION, SOLUTION INTRAVENOUS
Refills: 0 | Status: DISCONTINUED | OUTPATIENT
Start: 2021-06-29 | End: 2021-07-19

## 2021-06-29 RX ORDER — MONTELUKAST 4 MG/1
10 TABLET, CHEWABLE ORAL DAILY
Refills: 0 | Status: DISCONTINUED | OUTPATIENT
Start: 2021-06-29 | End: 2021-07-19

## 2021-06-29 RX ORDER — FERROUS SULFATE 325(65) MG
325 TABLET ORAL DAILY
Refills: 0 | Status: DISCONTINUED | OUTPATIENT
Start: 2021-06-29 | End: 2021-07-19

## 2021-06-29 RX ORDER — INSULIN GLARGINE 100 [IU]/ML
10 INJECTION, SOLUTION SUBCUTANEOUS AT BEDTIME
Refills: 0 | Status: DISCONTINUED | OUTPATIENT
Start: 2021-06-29 | End: 2021-07-02

## 2021-06-29 RX ORDER — METFORMIN HYDROCHLORIDE 850 MG/1
1 TABLET ORAL
Qty: 0 | Refills: 0 | DISCHARGE

## 2021-06-29 RX ORDER — ENOXAPARIN SODIUM 100 MG/ML
40 INJECTION SUBCUTANEOUS DAILY
Refills: 0 | Status: DISCONTINUED | OUTPATIENT
Start: 2021-06-29 | End: 2021-07-06

## 2021-06-29 RX ORDER — TOBRAMYCIN SULFATE 40 MG/ML
4 VIAL (ML) INJECTION
Qty: 0 | Refills: 0 | DISCHARGE

## 2021-06-29 RX ORDER — BUDESONIDE AND FORMOTEROL FUMARATE DIHYDRATE 160; 4.5 UG/1; UG/1
2 AEROSOL RESPIRATORY (INHALATION)
Refills: 0 | Status: DISCONTINUED | OUTPATIENT
Start: 2021-06-29 | End: 2021-07-19

## 2021-06-29 RX ORDER — MIRTAZAPINE 45 MG/1
7.5 TABLET, ORALLY DISINTEGRATING ORAL AT BEDTIME
Refills: 0 | Status: DISCONTINUED | OUTPATIENT
Start: 2021-06-29 | End: 2021-07-19

## 2021-06-29 RX ORDER — INSULIN LISPRO 100/ML
2 VIAL (ML) SUBCUTANEOUS
Refills: 0 | Status: DISCONTINUED | OUTPATIENT
Start: 2021-06-29 | End: 2021-06-29

## 2021-06-29 RX ORDER — GLUCAGON INJECTION, SOLUTION 0.5 MG/.1ML
1 INJECTION, SOLUTION SUBCUTANEOUS ONCE
Refills: 0 | Status: DISCONTINUED | OUTPATIENT
Start: 2021-06-29 | End: 2021-07-19

## 2021-06-29 RX ORDER — ASCORBIC ACID 60 MG
500 TABLET,CHEWABLE ORAL DAILY
Refills: 0 | Status: DISCONTINUED | OUTPATIENT
Start: 2021-06-29 | End: 2021-07-19

## 2021-06-29 RX ORDER — AMLODIPINE BESYLATE 2.5 MG/1
5 TABLET ORAL DAILY
Refills: 0 | Status: DISCONTINUED | OUTPATIENT
Start: 2021-06-29 | End: 2021-07-16

## 2021-06-29 RX ORDER — INSULIN LISPRO 100/ML
VIAL (ML) SUBCUTANEOUS AT BEDTIME
Refills: 0 | Status: DISCONTINUED | OUTPATIENT
Start: 2021-06-29 | End: 2021-07-01

## 2021-06-29 RX ORDER — DEXTROSE 50 % IN WATER 50 %
25 SYRINGE (ML) INTRAVENOUS ONCE
Refills: 0 | Status: DISCONTINUED | OUTPATIENT
Start: 2021-06-29 | End: 2021-07-19

## 2021-06-29 RX ORDER — ACETAMINOPHEN 500 MG
650 TABLET ORAL EVERY 4 HOURS
Refills: 0 | Status: DISCONTINUED | OUTPATIENT
Start: 2021-06-29 | End: 2021-07-19

## 2021-06-29 RX ORDER — DEXTROSE 50 % IN WATER 50 %
15 SYRINGE (ML) INTRAVENOUS ONCE
Refills: 0 | Status: DISCONTINUED | OUTPATIENT
Start: 2021-06-29 | End: 2021-07-19

## 2021-06-29 RX ORDER — FAMOTIDINE 10 MG/ML
40 INJECTION INTRAVENOUS AT BEDTIME
Refills: 0 | Status: DISCONTINUED | OUTPATIENT
Start: 2021-06-29 | End: 2021-07-19

## 2021-06-29 RX ORDER — FAMOTIDINE 10 MG/ML
40 INJECTION INTRAVENOUS AT BEDTIME
Refills: 0 | Status: DISCONTINUED | OUTPATIENT
Start: 2021-06-29 | End: 2021-06-29

## 2021-06-29 RX ORDER — INSULIN LISPRO 100/ML
VIAL (ML) SUBCUTANEOUS
Refills: 0 | Status: DISCONTINUED | OUTPATIENT
Start: 2021-06-29 | End: 2021-07-01

## 2021-06-29 RX ORDER — DEXTROSE 50 % IN WATER 50 %
12.5 SYRINGE (ML) INTRAVENOUS ONCE
Refills: 0 | Status: DISCONTINUED | OUTPATIENT
Start: 2021-06-29 | End: 2021-07-19

## 2021-06-29 RX ORDER — SODIUM CHLORIDE 9 MG/ML
4 INJECTION INTRAMUSCULAR; INTRAVENOUS; SUBCUTANEOUS
Refills: 0 | Status: DISCONTINUED | OUTPATIENT
Start: 2021-06-29 | End: 2021-06-30

## 2021-06-29 RX ORDER — SENNA PLUS 8.6 MG/1
1 TABLET ORAL
Qty: 0 | Refills: 0 | DISCHARGE

## 2021-06-29 RX ORDER — IPRATROPIUM/ALBUTEROL SULFATE 18-103MCG
3 AEROSOL WITH ADAPTER (GRAM) INHALATION EVERY 6 HOURS
Refills: 0 | Status: DISCONTINUED | OUTPATIENT
Start: 2021-06-29 | End: 2021-07-19

## 2021-06-29 RX ADMIN — INSULIN GLARGINE 10 UNIT(S): 100 INJECTION, SOLUTION SUBCUTANEOUS at 22:04

## 2021-06-29 RX ADMIN — Medication 2: at 22:04

## 2021-06-29 RX ADMIN — Medication 3 MILLILITER(S): at 21:12

## 2021-06-29 RX ADMIN — ALBUTEROL 1 PUFF(S): 90 AEROSOL, METERED ORAL at 15:02

## 2021-06-29 RX ADMIN — Medication 125 MILLIGRAM(S): at 15:07

## 2021-06-29 RX ADMIN — SODIUM CHLORIDE 4 MILLILITER(S): 9 INJECTION INTRAMUSCULAR; INTRAVENOUS; SUBCUTANEOUS at 21:12

## 2021-06-29 NOTE — ASU PATIENT PROFILE, ADULT - LANGUAGE ASSISTANCE NEEDED
unable to obtain exact information. pt. is on continuous bipap/No-Patient/Caregiver offered and refused free interpretation services.

## 2021-06-29 NOTE — ED PROVIDER NOTE - CLINICAL SUMMARY MEDICAL DECISION MAKING FREE TEXT BOX
Elzbieta: increasing SOB, 85% sat at home. long hx of COPD. no wt gain, no fever. Would treat as COPD exacerbation until studies (cxr, BNP) come back. Patient coughing will check for covid and pneumonia as well. Elzbieta: increasing SOB, 85% sat at home. long hx of COPD. no wt gain, no fever. Would treat as COPD exacerbation until studies (cxr, BNP) come back. Patient coughing will check for covid and pneumonia as well.    Patient is 65y/o F w/PMH of diffuse cystitic bronchiectasis, primary ciliary dyskinesia, allergic bronchopulmonary aspergillosis, pseudomonas pneumonia, HTN, DM2 (on home insulin), recently admitted on 3/31/21-4/18/21 for treatement of Pneumonia presents for 2 weeks SOB, 10 days productive cough, 3 days significantly worsening SOB. Physical exam w/significant end expiratory wheezes; + labored respirations; tachypneic to 38-41  Most likely COPD exacerbation / PNA: CBC, CMP, CXR, EKG  less likely ACS, PE given hx and clinical course  *call pulm crit fellow at [phone] once blood work returned

## 2021-06-29 NOTE — ASSESSMENT
[FreeTextEntry1] : Ms. Sky is a 66 year old woman with probable PCD, characterized by sinusitis, bronchiectasis and recurrent infections.  Has grown hemophilus influenza in January 2020 which was intermediate to Cipro.Last hospitalized in March, 2021 and treated for pseudomonas infection which was most recently isolated on sputum culture.  She is on a bronchodilator regimen and inhaled tobramycin.Most recently grew Pseudomonas, resistant to cipro but sensitive to Tobramycin.\par Status post 10 day course of high dose Levaquin, completed one week ago and prednisone.\par \par Symbicort 160 2 puffs twice daily\par Spiriva daily.\par Albuterol via nebulizer, followed by hypersal 3.5% and Aerobika valve three times daily\par Son states NELLY just completed. \par She is having an exacerbation of her bronchiectasis and has failed treatment at home.\par \par Plan:\par 1- Refer to the ED at [facility] for admission to the RCU.\par Will require lab work up cultures, CT Chest and IV antibiotics.  \par ED called.  \par Communicated with pulmonary team at [facility]

## 2021-06-29 NOTE — ED ADULT TRIAGE NOTE - CHIEF COMPLAINT QUOTE
Pt sent by MD Young, pt endorsing worsening shortness of breath and cough worsening X 2 weeks. Pt noted to be 85% on home O2 @ 2L NC, placed on 6L NC O2 O2 96%. Per patient son 94% is patient baseline. Noted to be tachypneic in triage. Denies fevers, chest pain, fevers, n/v/d. Phx: DM, COPD (on home O2)

## 2021-06-29 NOTE — CHART NOTE - NSCHARTNOTEFT_GEN_A_CORE
Critical Pco2 result. Pco2 93. ABG result reviewed.  ABG - ( 29 Jun 2021 21:08 )  pH, Arterial: 7.28  pH, Blood: x     /  pCO2: 92    /  pO2: 109   / HCO3: 36    / Base Excess: 14.9  /  SaO2: 98.3    Patient is currently on BIPAP at 12/5 Fi02 40%. Settings changed to 14/7 Fio2 28%. Will repeat ABG around midnight. Will continue to monitor.

## 2021-06-29 NOTE — ED PROVIDER NOTE - OBJECTIVE STATEMENT
Patient is 67y/o F w/PMH of diffuse cystitic bronchiectasis, primary ciliary dyskinesia, allergic bronchopulmonary aspergillosis, pseudomonas pneumonia, HTN, DM2 (on home insulin), recently admitted on 3/31/21-4/18/21 for treatement of Pneumonia presents for SOB. Was seen by pulmonogist o/p today () who directed pt to ER. States hx of 2weeks worsening SOB; 10 days of cough w/.productive yellow mucus; 3 days prior w/significantly worse SOB.   Patient recently rx'ed Tobramycin (finished 6/28) and prednisone taper (10mg/day this point)  Notes history of 'tremor' dropping hands.  Denies n/v/d, c/p, abdominal pain.

## 2021-06-29 NOTE — H&P ADULT - NSHPLABSRESULTS_GEN_ALL_CORE
LABS:                        13.0   10.43 )-----------( 204      ( 29 Jun 2021 15:22 )             44.7     29 Jun 2021 15:22    132    |  89     |  10     ----------------------------<  260    4.4     |  38     |  0.37     Ca    9.5        29 Jun 2021 15:22    TPro  7.4    /  Alb  3.8    /  TBili  0.3    /  DBili  x      /  AST  42     /  ALT  34     /  AlkPhos  169    29 Jun 2021 15:22    CXRay:   INTERPRETATION:  Bilateral coarse reticular opacities, unchanged from 4/19/2021, likely representing bronchiectasis/bronchial wall thickening as noted on CT chest from 3/31/2021.    EKG: Sinus rhythm

## 2021-06-29 NOTE — ED PROVIDER NOTE - PHYSICAL EXAMINATION
GENERAL: thin, sitting in bed, uncomfortably, difficulty breathing  HEAD:  Atraumatic, Normocephalic  EYES: EOMI, PERRLA, conjunctiva and sclera clear  ENT: Moist mucous membranes  NECK: Supple, No JVD  CHEST/LUNG: + expiratory wheezing; tachypneic; labored respirations on 4LPM nasal cannula  HEART: Regular rate and rhythm; No murmurs, rubs, or gallops  ABDOMEN: nondistended, BSx4, Soft, nontender  EXTREMITIES:  2+ Peripheral Pulses, brisk capillary refill  NERVOUS SYSTEM:  A&Ox3, no focal deficits   SKIN: No rashes or lesions

## 2021-06-29 NOTE — H&P ADULT - NSHPPHYSICALEXAM_GEN_ALL_CORE
GENERAL: NAD, well-developed woman, lethargic arouses to voice   HEAD:  Atraumatic, Normocephalic  EYES: EOMI, PERRLA, conjunctiva and sclera clear  NECK: Supple, No JVD  CHEST/LUNG: Poor inspiratory effort, very little air exchange appreciated, +accessory muscle use  HEART: Regular rate and rhythm; No murmurs, rubs, or gallops  ABDOMEN: Soft, Nontender, Nondistended; Bowel sounds present  EXTREMITIES:  2+ Peripheral Pulses, No clubbing, cyanosis, or edema  PSYCH: AAOx2 to self and place   NEUROLOGY: non-focal  SKIN: No rashes or lesions

## 2021-06-29 NOTE — CONSULT NOTE ADULT - ASSESSMENT
66 y.o. 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      Recommendations:  - Continue on BIPAP.  Repeat ABG to assess for improvement in hypercapnic respiratory failure.  Currently tolerating and mentating well.  If able to tolerate BIPAP, patient does not have ICU level needs.  - Agree with nebulizers, hypertonic saline, inhaled steroids, and systemic steroids per primary team  - Agree with starting empiric antibiotics, follow up blood cultures  - Reconsult ICU as needed    Discussed with Dr. Clayton

## 2021-06-29 NOTE — ED PROVIDER NOTE - NS ED ROS FT
GENERAL: + fatigue, denies significant weight loss or gain  HEENT: Denies rhinorrhea or congestion  CARDIAC: Denies chest pain or palpitations   PULM: as per HPI  GI: Denies decreased appetite, abdominal pain, nausea, vomiting, diarrhea, or constipation  RENAL/URO: Denies decreased urine output, dysuria, or hematuria  MSK: Denies arthralgias or joint pain  SKIN: Denies rashes  HEME: Denies bruising, bleeding, pallor, or jaundice  NEURO: Denies headache, dizziness, lightheadedness, or weakness

## 2021-06-29 NOTE — H&P ADULT - PROBLEM SELECTOR PLAN 1
Acute hypercarbic respiratory failure, hx of bronchiectasis, primary ciliary dyskinesia and COPD with diffuse wheezing noted on presentation. Hx of CR Pseudomonas in the past, will cover empirically with Avycaz overnight given severity of illness.   - Start Bipap   - Prednisone 40 QD   - Standing duonebs + Albuterol PRN + Hypertonic saline to facilitate airway clearance + home Symbicort   - Airway clearance device, chest PT  - Robitussin PRN   [ ] Sputum cx  [ ] ICU consult  [ ] Repeat ABG on Bipap  [ ] Would benefit from CT chest for better evaluation, however patient not currently stable for CT   - Discussed code status with son at bedside, son will discuss with family Acute hypercarbic respiratory failure, hx of bronchiectasis, primary ciliary dyskinesia and COPD with diffuse wheezing noted on presentation. Hx of CR Pseudomonas in the past, will cover empirically with Avycaz overnight given severity of illness. Will need ID consult in AM if continuing.   - Start Bipap   - Prednisone 40 QD   - Standing duonebs + Albuterol PRN + Hypertonic saline to facilitate airway clearance + home Symbicort   - Airway clearance device, chest PT  - Robitussin PRN   [ ] Sputum cx  [ ] ICU consult  [ ] Repeat ABG on Bipap  [ ] Would benefit from CT chest for better evaluation, however patient not currently stable for CT   - Discussed code status with son at bedside, son will discuss with family

## 2021-06-29 NOTE — HISTORY OF PRESENT ILLNESS
[TextBox_4] : Mrs. Sky was admitted to [facility] on 3/31/21-4/18/21 for fever, increased cough and sputum production.  Chest imaging showed apical ground glass opacity/ presumed pneumonia.  She was treated with inhaled Tobramycin and Avycaz for 10 days for  pseudomonas.  She had a positive Hep C screen.  Hepatology was consulted and diagnosed patient with cirrhosis - she has a nodular liver.  MRCP did not show evidence of mass.  \par \par She was COVID negative and antibody negative.  \par \par CT 3/31/21 showed bronchiectasis and tree in bud opacity, mosaic attenuation which were not changed compared with 7/31/18, but there was new GGO in the juan and left apex.  This was personally reviewed by me and compared to prior scans.  \par \par On 4/9/21 a modified barium swallow was negative.  \par \par Seen last in the office on 6/3//21. She was adherent to her clearance regimen.  Has continued to lose weight and was not taking Glcuerna as I had recommended.  I had prescribed an oral 3rd generation cephalosporin to treat her pseudomonas.  It was intermediately sensitive to Levaquin.  I arranged for her to have a visit with Dr. Yin and CF team and thought she would benefit from input from our nutritionist. \par \par Dr. Yin prescribed high dose levaquin for 10 days, increased her prednisone, and recommended increasing airway clearance to TID.  She also prescribed mirtazapine for appetite and suggested inceasing Glucerna to TID.\par \par She was slightly improved on televisit on 6/11/21.\par Returns today after acute visit.  Son reports that she is not well at home.  On first coming into the room today on 2 L NC her oxygen saturation was 78%.  Now on 3 L she is 90.  VS otherwise stable.  Not febrile.  \par \par He reports profound weakness, significant mucuous very thick and tenacious - "like a string" muddy green.  no fever. No  Pain in the Chest \par has wheezing, shortness of breath is worse.  \par \par inhaler regimen:  albuterol hypertonic saline, Aerobika TID\par Spiriva finished.\par Tobramycin completed today for the month. \par Symbicort twice daily\par She has been off prednisone for a few days.  \par \par

## 2021-06-29 NOTE — ED ADULT NURSE NOTE - OBJECTIVE STATEMENT
Facilitator RN  Pt received to room 2 AAO x 3 c/o SOB on nasal cannula. As per family, pt is SOB x 2-3- weeks worse the past 3 days with low O2(77%) and dyspnea on exertion. Pt presently mildly tachypneic with O2 100% on nasal cannula. Pt medicated as per MD case, labs sent and 18G placed to the left wrist. handoff given to primary RN.

## 2021-06-29 NOTE — H&P ADULT - PROBLEM SELECTOR PLAN 4
On Lantus 14 units Qhs + sliding scale Lispro with meals depending on BS level   - As patient on Bipap + starting steroid therapy, cont Lantus 10 units Qhs + Lispro CDI, hold mealtime Insulin for now   [ ] A1C

## 2021-06-29 NOTE — ED PROVIDER NOTE - ATTENDING CONTRIBUTION TO CARE
I performed a history and physical exam of the patient and discussed their management with the resident and /or advanced care provider. I reviewed the resident and /or ACP's note and agree with the documented findings and plan of care. My medical decision making and observations are found above.  Lungs rhoncherous with prolonged exp phase abd soft, I performed a history and physical exam of the patient and discussed their management with the resident and medical student. I reviewed the resident and /or ACP's note and agree with the documented findings and plan of care. My medical decision making and observations are found above.  Lungs rhoncherous with prolonged exp phase abd soft,

## 2021-06-29 NOTE — PHYSICAL EXAM
[Normal Appearance] : normal appearance [No Neck Mass] : no neck mass [Normal Rate/Rhythm] : normal rate/rhythm [Normal S1, S2] : normal s1, s2 [No Abnormalities] : no abnormalities [Normal Gait] : normal gait [No Clubbing] : no clubbing [No Cyanosis] : no cyanosis [No Edema] : no edema [Normal Color/ Pigmentation] : normal color/ pigmentation [No Focal Deficits] : no focal deficits [Oriented x3] : oriented x3 [Normal Affect] : normal affect [TextBox_2] : Appears ill [TextBox_132] : asterixis noted [TextBox_68] : diffuse rales and wheezes

## 2021-06-29 NOTE — H&P ADULT - NSHPSOCIALHISTORY_GEN_ALL_CORE
Lives at home with , son, daughter in law and grandchildren   Does not ambulate independently   Tobacco: Never   Drugs: None   Etoh: Never

## 2021-06-29 NOTE — H&P ADULT - HISTORY OF PRESENT ILLNESS
66 y.o. F w/ a hx of diffuse cystic bronchiectasis, primary ciliary dyskinesia on 2L O2, ?ABPA, HTN, DM2, recent admission for CR pseudomonas PNA who presents from OP Pulmonary office for worsening SOB, lethargy and cough. Patient was recently hospitalized in April 2021 for Carbapenem resistant Pseudomonas treated with 1 week of Avycaz and inhaled Tobramycin. She was discharged home and has been taking Tobramycin inhalation 1x every other month. Since discharge, patient has become increasingly more lethargic and weak. She can only get out of bed with significant assistance and can no longer ambulate independently. She is able to tolerate about 50% of her usual PO intake. Patient's son notes she has been much weaker, more confused and has not michelle answering questions appropriately. She was started on a Prednisone taper for unclear reasons about 10 days ago (20mg x 5, 15mg x 5, 10mg x 5 days). Over the last two weeks, patient's son notes worsening SOB, worsening cough over the last few days with thicker yellow mucus production, chills and night sweats. Patient endorses sinus congestion. Patient unable to answer many questions and lethargic on evaluation  In the ED, patient was hypoxic on her home 2L O2, now on 3L O2. She was noted to have diffuse expiratory wheezing, given methylprednisolone and nebulizer.

## 2021-06-29 NOTE — H&P ADULT - PROBLEM SELECTOR PLAN 2
Hx of primary ciliary dyskinesia c/b recurrent sinusitis,  bronchiectasis c/b multiple episodes of Pseudomonas PNA. Receives inhaled Tobramycin Q every other month (last dose yesterday).   - See plan above

## 2021-06-29 NOTE — CONSULT NOTE ADULT - SUBJECTIVE AND OBJECTIVE BOX
CHIEF COMPLAINT: Cough, shortness of breath    HPI:    66 y.o. F w/ a hx of diffuse cystic bronchiectasis, primary ciliary dyskinesia on 2L O2, allergic bronchopulmonary aspergillosis, HTN, DM2, recent admission for carbapenem resistant pseudomonas PNA who presents from outpatient pulmonary office for worsening SOB, lethargy and cough. Patient was recently hospitalized in April 2021 for Carbapenem resistant Pseudomonas treated with 1 week of Avycaz and inhaled Tobramycin.  She was also started on prednisone taper, currently taking 10mg daily.   She presented to the ED with 10 days of worsening cough of productive of yellowish mucus and 3 days of worsening shortness of breath.  Upon presentation, patient was found to be hypoxic and tachypneic on home 2L NC to mid 80s and placed on 6L NC with improvement in spo2.  She was diffusely wheezing and given nebulizers and methylprednisolone.  MICU was consulted for acute hypercapnic respiratory failure after blood gas resulted pCO2 >110.    PAST MEDICAL & SURGICAL HISTORY:    ABPA (allergic bronchopulmonary aspergillosis)    Bronchiectasis    Hypertension    Vitamin D deficiency    Microcytic anemia    GERD (gastroesophageal reflux disease)    Postnasal drip    Pseudomonas aeruginosa colonization    Allergic rhinitis    Recurrent pneumonia    Type 2 diabetes mellitus, with long-term current use of insulin    History of cholecystectomy    FAMILY HISTORY:  Family history of diabetes mellitus  father    Family history of allergic rhinitis (Child, Child)  son, daughter    Family history of bronchitis  parents    Allergies    No Known Allergies    HOME MEDICATIONS:    REVIEW OF SYSTEMS:  Constitutional: fatigue, chills  Eyes:  ENT:  CV:  Resp: Cough, shortness of breath  GI:  :  MSK:  Integumentary:  Neurological:  Psychiatric:  Endocrine:  Hematologic/Lymphatic:  Allergic/Immunologic:  [X] All other systems negative  [ ] Unable to assess ROS because ________    OBJECTIVE:  ICU Vital Signs Last 24 Hrs  T(C): 36.9 (29 Jun 2021 17:37), Max: 36.9 (29 Jun 2021 14:15)  T(F): 98.5 (29 Jun 2021 17:37), Max: 98.5 (29 Jun 2021 17:37)  HR: 88 (29 Jun 2021 19:14) (87 - 99)  BP: 147/71 (29 Jun 2021 17:37) (143/72 - 147/75)  BP(mean): --  ABP: --  ABP(mean): --  RR: 22 (29 Jun 2021 17:37) (22 - 25)  SpO2: 98% (29 Jun 2021 19:14) (96% - 100%)    CAPILLARY BLOOD GLUCOSE      POCT Blood Glucose.: 258 mg/dL (29 Jun 2021 14:26)    PHYSICAL EXAM:  General appearance: NAD, conversant, afebrile, on bipap, speaking and following commands   Neck: Trachea midline; Full range of motion, supple   Pulm: Coarse rales with diffuse wheezing, currently on bipap and pulling appropriate volumes   CV: RRR, No murmurs, rubs, or gallops.   Abdomen: Soft, non-tender, non-distended; no guarding or rebound   Extremities: No peripheral edema, 5/5 strength in all four extremities.   Skin: Dry, normal temperature, turgor and texture; no rash    HOSPITAL MEDICATIONS:  MEDICATIONS  (STANDING):  albuterol/ipratropium for Nebulization 3 milliLiter(s) Nebulizer every 6 hours  amLODIPine   Tablet 5 milliGRAM(s) Oral daily  ascorbic acid 500 milliGRAM(s) Oral daily  budesonide 160 MICROgram(s)/formoterol 4.5 MICROgram(s) Inhaler 2 Puff(s) Inhalation two times a day  ceftazidime/avibactam IVPB 2.5 Gram(s) IV Intermittent every 8 hours  dextrose 40% Gel 15 Gram(s) Oral once  dextrose 5%. 1000 milliLiter(s) (50 mL/Hr) IV Continuous <Continuous>  dextrose 5%. 1000 milliLiter(s) (100 mL/Hr) IV Continuous <Continuous>  dextrose 50% Injectable 25 Gram(s) IV Push once  dextrose 50% Injectable 12.5 Gram(s) IV Push once  dextrose 50% Injectable 25 Gram(s) IV Push once  enoxaparin Injectable 40 milliGRAM(s) SubCutaneous daily  famotidine    Tablet 40 milliGRAM(s) Oral at bedtime  ferrous    sulfate 325 milliGRAM(s) Oral daily  glucagon  Injectable 1 milliGRAM(s) IntraMuscular once  insulin glargine Injectable (LANTUS) 10 Unit(s) SubCutaneous at bedtime  insulin lispro (ADMELOG) corrective regimen sliding scale   SubCutaneous three times a day before meals  insulin lispro (ADMELOG) corrective regimen sliding scale   SubCutaneous at bedtime  mirtazapine 7.5 milliGRAM(s) Oral at bedtime  montelukast 10 milliGRAM(s) Oral daily  pantoprazole    Tablet 40 milliGRAM(s) Oral before breakfast  predniSONE   Tablet 40 milliGRAM(s) Oral daily  sodium chloride 3%  Inhalation 4 milliLiter(s) Inhalation two times a day    MEDICATIONS  (PRN):  acetaminophen   Tablet .. 650 milliGRAM(s) Oral every 4 hours PRN Mild Pain (1 - 3)  ALBUTerol    0.083% 2.5 milliGRAM(s) Nebulizer every 6 hours PRN Shortness of Breath and/or Wheezing  guaiFENesin Oral Liquid (Sugar-Free) 100 milliGRAM(s) Oral every 6 hours PRN Cough      LABS:                        13.0   10.43 )-----------( 204      ( 29 Jun 2021 15:22 )             44.7     06-29    132<L>  |  89<L>  |  10  ----------------------------<  260<H>  4.4   |  38<H>  |  0.37<L>    Ca    9.5      29 Jun 2021 15:22    TPro  7.4  /  Alb  3.8  /  TBili  0.3  /  DBili  x   /  AST  42<H>  /  ALT  34<H>  /  AlkPhos  169<H>  06-29    Venous Blood Gas:  06-29 @ 18:01  7.17/>110/156/34/98.6  VBG Lactate: 0.9    RADIOLOGY:  [ ] Reviewed and interpreted by me

## 2021-06-29 NOTE — H&P ADULT - ASSESSMENT
66 y.o. 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

## 2021-06-29 NOTE — H&P ADULT - NSHPREVIEWOFSYSTEMS_GEN_ALL_CORE
ROS:    Constitutional: [ ] fevers [x ] chills [ ] weight loss [ ] weight gain  HEENT: [ ] dry eyes [ ] eye irritation [ ] postnasal drip [x ] nasal congestion  CV: [ ] chest pain [ ] orthopnea [ ] palpitations [ ] murmur  Resp: [ x] cough [x ] shortness of breath [ ] dyspnea [ x] wheezing [ ] sputum [ ] hemoptysis  GI: [ ] nausea [ ] vomiting [ ] diarrhea [ ] constipation [ ] abd pain [ ] dysphagia   : [ ] dysuria [ ] nocturia [ ] hematuria [ ] increased urinary frequency  Musculoskeletal: [ ] back pain [ ] myalgias [ ] arthralgias [ ] fracture  Skin: [ ] rash [ ] itch  Neurological: [ ] headache [ ] dizziness [ ] syncope [ ] weakness [ ] numbness  Psychiatric: [ ] anxiety [ ] depression  Endocrine: [ x] diabetes [ ] thyroid problem  Hematologic/Lymphatic: [ ] anemia [ ] bleeding problem  Allergic/Immunologic: [ ] itchy eyes [ ] nasal discharge [ ] hives [ ] angioedema  [ x] All other systems negative  [ ] Unable to assess ROS because ________

## 2021-06-29 NOTE — H&P ADULT - PROBLEM SELECTOR PLAN 3
Unclear history of ABPA, will need to obtain more collateral Unclear history of ABPA,  negative aspergillus ag.   - outpatient pulmonary follow up with Dr. Young

## 2021-06-29 NOTE — H&P ADULT - PROBLEM SELECTOR PLAN 8
Lovenox subcu Hepatic cirrhosis dx US, MRCP. HCV Ab reactive, RNA not detected.   HIV neg. RUPESH, Mitochondrial Ab, microsomal Ab assay, all negative. ceruloplasmin normal, alpha fetoprotein normal Ca 19-9 mildly elevated at 42  [ ] OP Follow up with Hepatology

## 2021-06-30 LAB
A1C WITH ESTIMATED AVERAGE GLUCOSE RESULT: 8.9 % — HIGH (ref 4–5.6)
ANION GAP SERPL CALC-SCNC: 7 MMOL/L — SIGNIFICANT CHANGE UP (ref 7–14)
B PERT DNA SPEC QL NAA+PROBE: SIGNIFICANT CHANGE UP
BASE EXCESS BLDA CALC-SCNC: 16.3 MMOL/L — HIGH (ref -2–2)
BASE EXCESS BLDA CALC-SCNC: 16.8 MMOL/L — HIGH (ref -2–2)
BASE EXCESS BLDA CALC-SCNC: 19.2 MMOL/L — HIGH (ref -2–2)
BASOPHILS # BLD AUTO: 0.01 K/UL — SIGNIFICANT CHANGE UP (ref 0–0.2)
BASOPHILS NFR BLD AUTO: 0.1 % — SIGNIFICANT CHANGE UP (ref 0–2)
BUN SERPL-MCNC: 12 MG/DL — SIGNIFICANT CHANGE UP (ref 7–23)
C PNEUM DNA SPEC QL NAA+PROBE: SIGNIFICANT CHANGE UP
CALCIUM SERPL-MCNC: 9.6 MG/DL — SIGNIFICANT CHANGE UP (ref 8.4–10.5)
CHLORIDE SERPL-SCNC: 93 MMOL/L — LOW (ref 98–107)
CO2 SERPL-SCNC: 37 MMOL/L — HIGH (ref 22–31)
COVID-19 SPIKE DOMAIN AB INTERP: NEGATIVE — SIGNIFICANT CHANGE UP
COVID-19 SPIKE DOMAIN ANTIBODY RESULT: 0.4 U/ML — SIGNIFICANT CHANGE UP
CREAT SERPL-MCNC: 0.33 MG/DL — LOW (ref 0.5–1.3)
EOSINOPHIL # BLD AUTO: 0 K/UL — SIGNIFICANT CHANGE UP (ref 0–0.5)
EOSINOPHIL NFR BLD AUTO: 0 % — SIGNIFICANT CHANGE UP (ref 0–6)
ESTIMATED AVERAGE GLUCOSE: 209 — SIGNIFICANT CHANGE UP
FLUAV SUBTYP SPEC NAA+PROBE: SIGNIFICANT CHANGE UP
FLUBV RNA SPEC QL NAA+PROBE: SIGNIFICANT CHANGE UP
GLUCOSE BLDC GLUCOMTR-MCNC: 159 MG/DL — HIGH (ref 70–99)
GLUCOSE BLDC GLUCOMTR-MCNC: 249 MG/DL — HIGH (ref 70–99)
GLUCOSE BLDC GLUCOMTR-MCNC: 306 MG/DL — HIGH (ref 70–99)
GLUCOSE BLDC GLUCOMTR-MCNC: 87 MG/DL — SIGNIFICANT CHANGE UP (ref 70–99)
GLUCOSE SERPL-MCNC: 180 MG/DL — HIGH (ref 70–99)
GRAM STN FLD: SIGNIFICANT CHANGE UP
HADV DNA SPEC QL NAA+PROBE: SIGNIFICANT CHANGE UP
HCO3 BLDA-SCNC: 38 MMOL/L — HIGH (ref 22–26)
HCO3 BLDA-SCNC: 38 MMOL/L — HIGH (ref 22–26)
HCO3 BLDA-SCNC: 41 MMOL/L — HIGH (ref 22–26)
HCOV 229E RNA SPEC QL NAA+PROBE: SIGNIFICANT CHANGE UP
HCOV HKU1 RNA SPEC QL NAA+PROBE: SIGNIFICANT CHANGE UP
HCOV NL63 RNA SPEC QL NAA+PROBE: SIGNIFICANT CHANGE UP
HCOV OC43 RNA SPEC QL NAA+PROBE: SIGNIFICANT CHANGE UP
HCT VFR BLD CALC: 40.2 % — SIGNIFICANT CHANGE UP (ref 34.5–45)
HGB BLD-MCNC: 12.1 G/DL — SIGNIFICANT CHANGE UP (ref 11.5–15.5)
HMPV RNA SPEC QL NAA+PROBE: SIGNIFICANT CHANGE UP
HPIV1 RNA SPEC QL NAA+PROBE: SIGNIFICANT CHANGE UP
HPIV2 RNA SPEC QL NAA+PROBE: SIGNIFICANT CHANGE UP
HPIV3 RNA SPEC QL NAA+PROBE: SIGNIFICANT CHANGE UP
HPIV4 RNA SPEC QL NAA+PROBE: SIGNIFICANT CHANGE UP
IANC: 7.11 K/UL — SIGNIFICANT CHANGE UP (ref 1.5–8.5)
IMM GRANULOCYTES NFR BLD AUTO: 0.3 % — SIGNIFICANT CHANGE UP (ref 0–1.5)
LYMPHOCYTES # BLD AUTO: 1.28 K/UL — SIGNIFICANT CHANGE UP (ref 1–3.3)
LYMPHOCYTES # BLD AUTO: 14.1 % — SIGNIFICANT CHANGE UP (ref 13–44)
MAGNESIUM SERPL-MCNC: 2 MG/DL — SIGNIFICANT CHANGE UP (ref 1.6–2.6)
MCHC RBC-ENTMCNC: 27.3 PG — SIGNIFICANT CHANGE UP (ref 27–34)
MCHC RBC-ENTMCNC: 30.1 GM/DL — LOW (ref 32–36)
MCV RBC AUTO: 90.5 FL — SIGNIFICANT CHANGE UP (ref 80–100)
MONOCYTES # BLD AUTO: 0.63 K/UL — SIGNIFICANT CHANGE UP (ref 0–0.9)
MONOCYTES NFR BLD AUTO: 7 % — SIGNIFICANT CHANGE UP (ref 2–14)
NEUTROPHILS # BLD AUTO: 7.11 K/UL — SIGNIFICANT CHANGE UP (ref 1.8–7.4)
NEUTROPHILS NFR BLD AUTO: 78.5 % — HIGH (ref 43–77)
NRBC # BLD: 0 /100 WBCS — SIGNIFICANT CHANGE UP
NRBC # FLD: 0 K/UL — SIGNIFICANT CHANGE UP
PCO2 BLDA: 76 MMHG — CRITICAL HIGH (ref 32–48)
PCO2 BLDA: 76 MMHG — CRITICAL HIGH (ref 32–48)
PCO2 BLDA: 94 MMHG — CRITICAL HIGH (ref 32–48)
PH BLDA: 7.29 — LOW (ref 7.35–7.45)
PH BLDA: 7.37 — SIGNIFICANT CHANGE UP (ref 7.35–7.45)
PH BLDA: 7.39 — SIGNIFICANT CHANGE UP (ref 7.35–7.45)
PHOSPHATE SERPL-MCNC: 3.9 MG/DL — SIGNIFICANT CHANGE UP (ref 2.5–4.5)
PLATELET # BLD AUTO: 180 K/UL — SIGNIFICANT CHANGE UP (ref 150–400)
PO2 BLDA: 134 MMHG — HIGH (ref 83–108)
PO2 BLDA: 175 MMHG — HIGH (ref 83–108)
PO2 BLDA: 53 MMHG — LOW (ref 83–108)
POTASSIUM SERPL-MCNC: 4.4 MMOL/L — SIGNIFICANT CHANGE UP (ref 3.5–5.3)
POTASSIUM SERPL-SCNC: 4.4 MMOL/L — SIGNIFICANT CHANGE UP (ref 3.5–5.3)
RBC # BLD: 4.44 M/UL — SIGNIFICANT CHANGE UP (ref 3.8–5.2)
RBC # FLD: 13.2 % — SIGNIFICANT CHANGE UP (ref 10.3–14.5)
RSV RNA SPEC QL NAA+PROBE: SIGNIFICANT CHANGE UP
RV+EV RNA SPEC QL NAA+PROBE: SIGNIFICANT CHANGE UP
SAO2 % BLDA: 87.5 % — LOW (ref 95–99)
SAO2 % BLDA: 99.1 % — HIGH (ref 95–99)
SAO2 % BLDA: 99.2 % — HIGH (ref 95–99)
SARS-COV-2 IGG+IGM SERPL QL IA: 0.4 U/ML — SIGNIFICANT CHANGE UP
SARS-COV-2 IGG+IGM SERPL QL IA: NEGATIVE — SIGNIFICANT CHANGE UP
SODIUM SERPL-SCNC: 137 MMOL/L — SIGNIFICANT CHANGE UP (ref 135–145)
SPECIMEN SOURCE: SIGNIFICANT CHANGE UP
WBC # BLD: 9.06 K/UL — SIGNIFICANT CHANGE UP (ref 3.8–10.5)
WBC # FLD AUTO: 9.06 K/UL — SIGNIFICANT CHANGE UP (ref 3.8–10.5)

## 2021-06-30 PROCEDURE — 99254 IP/OBS CNSLTJ NEW/EST MOD 60: CPT | Mod: GC

## 2021-06-30 PROCEDURE — 70487 CT MAXILLOFACIAL W/DYE: CPT | Mod: 26

## 2021-06-30 PROCEDURE — 99233 SBSQ HOSP IP/OBS HIGH 50: CPT | Mod: GC,25

## 2021-06-30 PROCEDURE — 71260 CT THORAX DX C+: CPT | Mod: 26

## 2021-06-30 RX ORDER — SODIUM CHLORIDE 0.65 %
1 AEROSOL, SPRAY (ML) NASAL
Refills: 0 | Status: DISCONTINUED | OUTPATIENT
Start: 2021-06-30 | End: 2021-07-02

## 2021-06-30 RX ORDER — TOBRAMYCIN SULFATE 40 MG/ML
300 VIAL (ML) INJECTION EVERY 12 HOURS
Refills: 0 | Status: DISCONTINUED | OUTPATIENT
Start: 2021-06-30 | End: 2021-07-02

## 2021-06-30 RX ORDER — CHLORHEXIDINE GLUCONATE 213 G/1000ML
1 SOLUTION TOPICAL DAILY
Refills: 0 | Status: DISCONTINUED | OUTPATIENT
Start: 2021-06-30 | End: 2021-07-19

## 2021-06-30 RX ORDER — SODIUM CHLORIDE 9 MG/ML
4 INJECTION INTRAMUSCULAR; INTRAVENOUS; SUBCUTANEOUS
Refills: 0 | Status: DISCONTINUED | OUTPATIENT
Start: 2021-06-30 | End: 2021-07-01

## 2021-06-30 RX ADMIN — INSULIN GLARGINE 10 UNIT(S): 100 INJECTION, SOLUTION SUBCUTANEOUS at 21:24

## 2021-06-30 RX ADMIN — MIRTAZAPINE 7.5 MILLIGRAM(S): 45 TABLET, ORALLY DISINTEGRATING ORAL at 21:24

## 2021-06-30 RX ADMIN — ENOXAPARIN SODIUM 40 MILLIGRAM(S): 100 INJECTION SUBCUTANEOUS at 11:14

## 2021-06-30 RX ADMIN — Medication 3 MILLILITER(S): at 10:16

## 2021-06-30 RX ADMIN — SODIUM CHLORIDE 4 MILLILITER(S): 9 INJECTION INTRAMUSCULAR; INTRAVENOUS; SUBCUTANEOUS at 10:21

## 2021-06-30 RX ADMIN — Medication 500 MILLIGRAM(S): at 11:14

## 2021-06-30 RX ADMIN — SODIUM CHLORIDE 4 MILLILITER(S): 9 INJECTION INTRAMUSCULAR; INTRAVENOUS; SUBCUTANEOUS at 21:03

## 2021-06-30 RX ADMIN — CHLORHEXIDINE GLUCONATE 1 APPLICATION(S): 213 SOLUTION TOPICAL at 13:12

## 2021-06-30 RX ADMIN — Medication 3 MILLILITER(S): at 21:03

## 2021-06-30 RX ADMIN — Medication 325 MILLIGRAM(S): at 11:15

## 2021-06-30 RX ADMIN — Medication 4: at 17:52

## 2021-06-30 RX ADMIN — BUDESONIDE AND FORMOTEROL FUMARATE DIHYDRATE 2 PUFF(S): 160; 4.5 AEROSOL RESPIRATORY (INHALATION) at 21:03

## 2021-06-30 RX ADMIN — Medication 3 MILLILITER(S): at 16:56

## 2021-06-30 RX ADMIN — FAMOTIDINE 40 MILLIGRAM(S): 10 INJECTION INTRAVENOUS at 21:23

## 2021-06-30 RX ADMIN — Medication 3 MILLILITER(S): at 03:07

## 2021-06-30 RX ADMIN — Medication 1 SPRAY(S): at 17:53

## 2021-06-30 RX ADMIN — Medication 1: at 09:24

## 2021-06-30 RX ADMIN — Medication 100 MILLIGRAM(S): at 22:52

## 2021-06-30 RX ADMIN — SODIUM CHLORIDE 4 MILLILITER(S): 9 INJECTION INTRAMUSCULAR; INTRAVENOUS; SUBCUTANEOUS at 16:56

## 2021-06-30 RX ADMIN — MONTELUKAST 10 MILLIGRAM(S): 4 TABLET, CHEWABLE ORAL at 11:14

## 2021-06-30 NOTE — CHART NOTE - NSCHARTNOTEFT_GEN_A_CORE
critical Pco2 result - PCo2 94. Patient seen and assessed at bedside. Patient noted to be not pulling enough tidal volume. BIPAP settings changed to 16/5 FIo2 28%. Patient is awake and more responsive. Will repeat ABG at 0830. Consider AVAPS if pco2 not improved with the current settings. Will continue to monitor.   ABG - ( 30 Jun 2021 05:16 )  pH, Arterial: 7.29  pH, Blood: x     /  pCO2: 94    /  pO2: 175   / HCO3: 38    / Base Excess: 16.8  /  SaO2: 99.2

## 2021-06-30 NOTE — PHYSICAL THERAPY INITIAL EVALUATION ADULT - PRECAUTIONS/LIMITATIONS, REHAB EVAL
+3L of O2 through nasal cannula; CONTACT- Hx of  PSEUD in Sputum/cardiac precautions/fall precautions/isolation precautions/oxygen therapy device and L/min

## 2021-06-30 NOTE — PHYSICAL THERAPY INITIAL EVALUATION ADULT - PERTINENT HX OF CURRENT PROBLEM, REHAB EVAL
66 y.o. F w/ a hx of diffuse cystic bronchiectasis, primary ciliary dyskinesia on 2L O2, ?ABPA, HTN, DM2, recent admission for CR pseudomonas PNA hospitalized for acute hypercarbic respiratory failure

## 2021-06-30 NOTE — CONSULT NOTE ADULT - ASSESSMENT
66 y.o. 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     RECS PENDING, NOTE NOT FINAL 66 y.o. 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     Pseudomonas Pneumonia  -recent hospital admission for pseudomonas pneumonia, risk factors of bronchiectasis, structural lung abnormality (primary ciliary dyskinesia), here for acute hypercarbic respiratory failure  -f/u sputum culture  -c/w:     66 y.o. 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     Pseudomonas Pneumonia  -recent hospital admission for pseudomonas pneumonia, risk factors of bronchiectasis, structural lung abnormality (primary ciliary dyskinesia), CXR this admission shows chronic bronchiectasis/bronchial wall thickening, prior left upper lobe consolidation resolved, however given borderline sensitivities Ceftazidime/Avibactam and aminoglycosides, concern for evolving resistance, patient likely colonized  -f/u sputum culture 6/30  -c/w avycaz at 0.25mg/kg q8 hours (we will order as antibiotic non-formulary) for at least 7 days  -c/w inhaled tobramycin 300mg q12 hours  - awaiting sensitivities, will tailor as needed  -place on isolation precautions    Case discussed with Dr. Reji Mccormick PGY-2

## 2021-06-30 NOTE — PROGRESS NOTE ADULT - ASSESSMENT
66 y.o. 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       #Acute hypercarbic respiratory failure, hx of bronchiectasis, primary ciliary dyskinesia and COPD with diffuse wheezing noted on presentation. Hx of CR Pseudomonas in the past, will cover empirically with Avycaz overnight given severity of illness. Will need ID consult in AM if continuing.   - Start Bipap   - Prednisone 40 QD   - Standing duonebs + Albuterol PRN + Hypertonic saline to facilitate airway clearance + home Symbicort   - Airway clearance device, chest PT  - Robitussin PRN   [ ] Sputum cx reordered   [ ] Repeat ABG on Bipap settings adjusted   [ ] Would benefit from CT chest for better evaluation, however patient not currently stable for CT   - Discussed code status with son at bedside, son will discuss with family    #Hx of primary ciliary dyskinesia c/b recurrent sinusitis,  bronchiectasis c/b multiple episodes of Pseudomonas PNA.  - Receives inhaled Tobramycin Q every other month (last dose yesterday).   - See plan above    Unclear history of ABPA,  (allergic bronchopulmonary aspergillosis) negative aspergillus ag.   - outpatient pulmonary follow up with Dr. Young    #DM type 2  On Lantus 14 units Qhs + sliding scale Lispro with meals depending on BS level   - As patient on Bipap + starting steroid therapy, cont Lantus 10 units Qhs + Lispro CDI, hold mealtime Insulin for now   [ ] A1C    #COPD  Cont home inhalers, see plan above    #GERD  Cont PPI + Famotidine      #HTN  Cont home amlodipine    #Hepatic cirrhosis dx US, MRCP. HCV Ab reactive, RNA not detected.   HIV neg. RUPESH, Mitochondrial Ab, microsomal Ab assay, all negative. ceruloplasmin normal, alpha fetoprotein normal Ca 19-9 mildly elevated at 42  [ ] OP Follow up with Hepatology    #DVT PPX   Lovenox subq      66 y.o. 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       #Acute hypercarbic respiratory failure, hx of bronchiectasis, primary ciliary dyskinesia and COPD with diffuse wheezing noted on presentation. Hx of CR Pseudomonas in the past, will cover empirically with Avycaz overnight given severity of illness.   - BIpap transitioned to AVAPS use hs and prn during day   - Prednisone 40 QD   - Standing duonebs + Albuterol PRN + Hypertonic saline to facilitate airway clearance + home Symbicort   - Airway clearance device, chest PT IPV with duonebs and acapella in between   - Robitussin PRN   - Sputum cx reordered /pending cx from office   [ ] Repeat ABG with change to AVAPS    - Discussed code status with son at bedside, son will discuss with family    #Hx of primary ciliary dyskinesia c/b recurrent sinusitis,  bronchiectasis c/b multiple episodes of Pseudomonas PNA.  - Evaluated by ID, continue Avaycaz and inhaled tobra   - Receives inhaled Tobramycin Q every other month (last dose yesterday).   - See plan above    Unclear history of ABPA,  (allergic bronchopulmonary aspergillosis) negative aspergillus ag.   - outpatient pulmonary follow up with Dr. Young    #DM type 2  On Lantus 14 units Qhs + sliding scale Lispro with meals depending on BS level   - As patient on Bipap + starting steroid therapy, cont Lantus 10 units Qhs + Lispro CDI, hold mealtime Insulin for now   - Consistant carb diet, DASH , Mechanical soft   - A1C 8.9  - RISS     #COPD  Cont home inhalers, see plan above    #GERD  Cont PPI + Famotidine    #HTN  Cont home amlodipine    #Hepatic cirrhosis dx US, MRCP. HCV Ab reactive, RNA not detected.   HIV neg. RUPESH, Mitochondrial Ab, microsomal Ab assay, all negative. ceruloplasmin normal, alpha fetoprotein normal Ca 19-9 mildly elevated at 42  - OP Follow up with Hepatology    #DVT PPX   Lovenox subq

## 2021-06-30 NOTE — PHYSICAL THERAPY INITIAL EVALUATION ADULT - GENERAL OBSERVATIONS, REHAB EVAL
Pt encountered in semisupine position, no distress, AxOx4, with +IV, +tele, +3L of O2 through nasal cannula, and family @bedside.

## 2021-06-30 NOTE — CONSULT NOTE ADULT - SUBJECTIVE AND OBJECTIVE BOX
Patient is a 66y old  Female who presents with a chief complaint of SOB (30 Jun 2021 06:51)    HPI:  66 y.o. 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 Patient was recently hospitalized in April 2021 for Carbapenem resistant Pseudomonas treated with 1 week of Avycaz and inhaled Tobramycin. She was discharged home and has been taking Tobramycin inhalation 1x every other month. Since discharge, patient has become increasingly more lethargic and weak. She can only get out of bed with significant assistance and can no longer ambulate independently. She is able to tolerate about 50% of her usual PO intake. Patient's son notes she has been much weaker, more confused and has not been answering questions appropriately. She was started on a Prednisone taper for unclear reasons about 10 days ago (20mg x 5, 15mg x 5, 10mg x 5 days). Over the last two weeks, patient's son notes worsening SOB, worsening cough over the last few days with thicker yellow mucus production, chills and night sweats. Patient endorses sinus congestion. Patient unable to answer many questions and lethargic on evaluation  In the ED, patient was hypoxic on her home 2L O2, now on 3L O2. She was noted to have diffuse expiratory wheezing, given methylprednisolone and nebulizer.  (29 Jun 2021 18:58)    Patient admitted for hypercapnic respiratory failure requiring positive pressure ventilation. Patient continued on BiPAP, nebulizers, hypertonic saline, inhaled steroids and systemic steroids, started on empiric avycaz (s/p 2 doses, last dose 6AM 6/30). Pending sputum culture, CT chest. RVP negative, Hep C reactive viral load undetectable, HIV negative. ID consulted for management of suspected pseudomonas pneumonia.        REVIEW OF SYSTEMS  [  ] ROS unobtainable because:    [  ] All other systems negative except as noted below    Constitutional:  [ ] fever [ ] chills  [ ] weight loss  [ ]night sweat  [ ]poor appetite/PO intake [ ]fatigue   Skin:  [ ] rash [ ] phlebitis	  Eyes: [ ] icterus [ ] pain  [ ] discharge	  ENMT: [ ] sore throat  [ ] thrush [ ] ulcers [ ] exudates [ ]anosmia  Respiratory: [ ] dyspnea [ ] hemoptysis [ ] cough [ ] sputum	  Cardiovascular:  [ ] chest pain [ ] palpitations [ ] edema	  Gastrointestinal:  [ ] nausea [ ] vomiting [ ] diarrhea [ ] constipation [ ] pain	  Genitourinary:  [ ] dysuria [ ] frequency [ ] hematuria [ ] discharge [ ] flank pain  [ ] incontinence  Musculoskeletal:  [ ] myalgias [ ] arthralgias [ ] arthritis  [ ] back pain  Neurological:  [ ] headache [ ] weakness [ ] seizures  [ ] confusion/altered mental status    prior hospital charts reviewed [V]  primary team notes reviewed [V]  other consultant notes reviewed [V]    PAST MEDICAL & SURGICAL HISTORY:  ABPA (allergic bronchopulmonary aspergillosis)    Bronchiectasis    Hypertension    Vitamin D deficiency    Microcytic anemia    GERD (gastroesophageal reflux disease)    Postnasal drip    Pseudomonas aeruginosa colonization    Allergic rhinitis    Recurrent pneumonia    Type 2 diabetes mellitus, with long-term current use of insulin    History of cholecystectomy        SOCIAL HISTORY:  - Denied smoking/vaping/alcohol/recreational drug use    FAMILY HISTORY:  Family history of diabetes mellitus  father    Family history of allergic rhinitis (Child, Child)  son, daughter    Family history of bronchitis  parents        Allergies  No Known Allergies        ANTIMICROBIALS:      ANTIMICROBIALS (past 90 days):  MEDICATIONS  (STANDING):  ceftazidime/avibactam IVPB   50 mL/Hr IV Intermittent (06-30-21 @ 05:42)   50 mL/Hr IV Intermittent (06-29-21 @ 22:03)        OTHER MEDS:   MEDICATIONS  (STANDING):  acetaminophen   Tablet .. 650 every 4 hours PRN  ALBUTerol    0.083% 2.5 every 6 hours PRN  albuterol/ipratropium for Nebulization 3 every 6 hours  amLODIPine   Tablet 5 daily  budesonide 160 MICROgram(s)/formoterol 4.5 MICROgram(s) Inhaler 2 two times a day  dextrose 40% Gel 15 once  dextrose 50% Injectable 25 once  dextrose 50% Injectable 12.5 once  dextrose 50% Injectable 25 once  enoxaparin Injectable 40 daily  famotidine    Tablet 40 at bedtime  glucagon  Injectable 1 once  guaiFENesin Oral Liquid (Sugar-Free) 100 every 6 hours PRN  insulin glargine Injectable (LANTUS) 10 at bedtime  insulin lispro (ADMELOG) corrective regimen sliding scale  three times a day before meals  insulin lispro (ADMELOG) corrective regimen sliding scale  at bedtime  mirtazapine 7.5 at bedtime  montelukast 10 daily  pantoprazole    Tablet 40 before breakfast  predniSONE   Tablet 40 daily  sodium chloride 3%  Inhalation 4 two times a day      VITALS:  Vital Signs Last 24 Hrs  T(F): 97.2 (06-29-21 @ 20:31), Max: 98.5 (06-29-21 @ 17:37)    Vital Signs Last 24 Hrs  HR: 84 (06-30-21 @ 07:37) (74 - 99)  BP: 145/71 (06-29-21 @ 20:31) (127/64 - 147/75)  RR: 22 (06-29-21 @ 20:31)  SpO2: 93% (06-30-21 @ 07:37) (93% - 100%)  Wt(kg): --    EXAM:  GA: NAD  HEENT: oral cavity no lesion  CV: nl S1/S2, no RMG  Lungs: CTAB  Abd: BS+, soft, nontender, no rebounding pain  Ext: no edema  Skin:  IV: no phlebitis    Labs:                        12.1   9.06  )-----------( 180      ( 30 Jun 2021 08:17 )             40.2     06-30    137  |  93<L>  |  12  ----------------------------<  180<H>  4.4   |  37<H>  |  0.33<L>    Ca    9.6      30 Jun 2021 08:17  Phos  3.9     06-30  Mg     2.00     06-30    TPro  7.4  /  Alb  3.8  /  TBili  0.3  /  DBili  x   /  AST  42<H>  /  ALT  34<H>  /  AlkPhos  169<H>  06-29      WBC Trend:  WBC Count: 9.06 (06-30-21 @ 08:17)  WBC Count: 10.43 (06-29-21 @ 15:22)      Auto Neutrophil #: 7.11 K/uL (06-30-21 @ 08:17)  Auto Neutrophil #: 7.74 K/uL (06-29-21 @ 15:22)  Auto Neutrophil #: 5.70 K/uL (04-19-21 @ 21:35)  Auto Neutrophil #: 6.43 K/uL (04-02-21 @ 07:54)  Auto Neutrophil #: 6.67 K/uL (04-01-21 @ 08:07)      Creatine Trend:  Creatinine, Serum: 0.33 (06-30)  Creatinine, Serum: 0.37 (06-29)      Liver Biochemical Testing Trend:  Alanine Aminotransferase (ALT/SGPT): 34 *H* (06-29)  Alanine Aminotransferase (ALT/SGPT): 61 *H* (04-20)  Alanine Aminotransferase (ALT/SGPT): 58 *H* (04-19)  Alanine Aminotransferase (ALT/SGPT): 99 *H* (04-10)  Alanine Aminotransferase (ALT/SGPT): 116 *H* (04-09)  Aspartate Aminotransferase (AST/SGOT): 42 (06-29-21 @ 15:22)  Aspartate Aminotransferase (AST/SGOT): 56 (04-20-21 @ 06:43)  Aspartate Aminotransferase (AST/SGOT): 40 (04-19-21 @ 21:35)  Aspartate Aminotransferase (AST/SGOT): 45 (04-10-21 @ 07:00)  Aspartate Aminotransferase (AST/SGOT): 62 (04-09-21 @ 08:08)  Bilirubin Total, Serum: 0.3 (06-29)  Bilirubin Total, Serum: 0.3 (04-20)  Bilirubin Total, Serum: 0.3 (04-19)  Bilirubin Direct, Serum: <0.2 (04-10)  Bilirubin Total, Serum: 0.2 (04-10)      Trend LDH      Auto Eosinophil %: 0.0 % (06-30-21 @ 08:17)  Auto Eosinophil %: 1.2 % (06-29-21 @ 15:22)          MICROBIOLOGY:        Culture - Urine (collected 19 Apr 2021 22:34)  Source: .Urine Clean Catch (Midstream)  Final Report:    <10,000 CFU/mL Normal Urogenital Denise    Culture - Sputum (collected 04 Apr 2021 14:28)  Source: .Sputum Sputum  Final Report:    Rare Pseudomonas aeruginosa (Carbapenem Resistant)    Normal Respiratory Denise present  Organism: Pseudomonas aeruginosa (Carbapenem Resistant)    Sensitivities:      -  Amikacin: S <=16      -  Aztreonam: R >16      -  Cefepime: R >16      -  Ceftazidime: S 4      -  Ceftazidime/Avibactam: S <=4      -  Ceftolozane/tazobactam: S <=2      -  Ciprofloxacin: R >2      -  Gentamicin: S 4      -  Imipenem: R >8      -  Levofloxacin: R >4      -  Meropenem: I 4      -  Piperacillin/Tazobactam: I 32      -  Tobramycin: S <=2      Method Type: ELIEZER  Organism: Pseudomonas aeruginosa (Carbapenem Resistant)    Culture - Urine (collected 01 Apr 2021 15:58)  Source: .Urine Clean Catch (Midstream)  Final Report:    >=3 organisms. Probable collection contamination.    Culture - Blood (collected 31 Mar 2021 19:30)  Source: .Blood Blood-Peripheral  Final Report:    No Growth Final    Culture - Blood (collected 31 Mar 2021 19:15)  Source: .Blood Blood-Peripheral  Final Report:    No Growth Final    Culture - Fungal, Sputum (collected 14 Aug 2020 17:38)  Source: .Sputum Sputum  Final Report:    Few Yeast    Culture - Sputum (collected 11 Aug 2020 17:30)  Source: .Sputum Sputum  Final Report:    Moderate Pseudomonas aeruginosa    Normal Respiratory Denise present  Organism: Pseudomonas aeruginosa  Organism: Pseudomonas aeruginosa    Sensitivities:      -  Amikacin: S <=16      -  Aztreonam: S <=4      -  Cefepime: S <=2      -  Ceftazidime: S <=1      -  Ciprofloxacin: I 1      -  Gentamicin: S <=2      -  Imipenem: S <=1      -  Levofloxacin: S <=0.5      -  Meropenem: S <=1      -  Piperacillin/Tazobactam: S <=8      -  Tobramycin: S <=2      Method Type: ELIEZER    Culture - Blood (collected 11 Aug 2020 15:30)  Source: .Blood Blood-Peripheral  Final Report:    No Growth Final    Culture - Blood (collected 11 Aug 2020 15:28)  Source: .Blood Blood-Venous  Final Report:    No Growth Final    Culture - Respiratory with Gram Stain (collected 14 Jan 2020 17:11)  Source: SPUTUM  Final Report:    Normal Respiratory Denise Also Present  Organism: Pseudomonas aeruginosa  Organism: Pseudomonas aeruginosa  QUANTITY OF GROWTH: RARE    Sensitivities:      -  Amikacin: S <=8 ELIEZER      -  Aztreonam: S <=4 ELIEZER      -  Cefepime: S <=2 ELIEZER      -  Ceftazidime: S <=1 ELIEZER      -  Gentamicin: S 2 ELIEZER      -  Imipenem: I 4 ELIEZER      -  Levofloxacin: S <=1 ELIEZER      -  Meropenem: S <=1 ELIEZER      -  Piperacillin/Tazobactam: S      -  Tobramycin: S <=2 ELIEZER      Method Type: NEGATIVE ELIEZER 43      HIV-1/2 Combo Result: Nonreact (04-08-21 @ 07:36)                    Rapid RVP Result: NotDetec (06-29 @ 15:50)      COVID-19 Otoniel Domain AB Interp: Negative (04-21-21 @ 09:47)  COVID-19 Otoniel Domain AB Interp: Negative (04-02-21 @ 22:27)  COVID-19 IgG Antibody Interpretation: Negative (08-12-20 @ 05:09)      Procalcitonin, Serum: 0.07 (06-29)            Serum Pro-Brain Natriuretic Peptide: 258 (06-29)    Troponin T, High Sensitivity Result: 6 (06-29)    Blood Gas Venous - Lactate: 0.9 (06-29 @ 18:01)    A1C with Estimated Average Glucose Result: 8.9 % (06-30-21 @ 07:28)  A1C with Estimated Average Glucose Result: 8.5 % (04-08-21 @ 07:36)  A1C with Estimated Average Glucose Result: 8.2 % (04-01-21 @ 15:25)      RADIOLOGY:  imaging below personally reviewed    < from: Xray Chest 2 Views PA/Lat (06.29.21 @ 17:14) >  IMPRESSION:  Bilateral coarse interstitial opacities consistent with chronicbronchiectasis/bronchial wall thickening. Otherwise, lungs are clear.    < end of copied text >     Patient is a 66y old  Female who presents with a chief complaint of SOB (30 Jun 2021 06:51)    HPI:  66 y.o. 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 Patient was recently hospitalized in April 2021 for Carbapenem resistant Pseudomonas treated with 1 week of Avycaz and inhaled Tobramycin. She was discharged home and has been taking Tobramycin inhalation 1x every other month. Since discharge, patient has become increasingly more lethargic and weak. She can only get out of bed with significant assistance and can no longer ambulate independently. She is able to tolerate about 50% of her usual PO intake. Patient's son notes she has been much weaker, more confused and has not been answering questions appropriately. She was started on a Prednisone taper for unclear reasons about 10 days ago (20mg x 5, 15mg x 5, 10mg x 5 days). Over the last two weeks, patient's son notes worsening SOB, worsening cough over the last few days with thicker yellow mucus production, chills and night sweats. Patient endorses sinus congestion. Patient unable to answer many questions and lethargic on evaluation  In the ED, patient was hypoxic on her home 2L O2, now on 3L O2. She was noted to have diffuse expiratory wheezing, given methylprednisolone and nebulizer.  (29 Jun 2021 18:58)    Patient admitted for hypercapnic respiratory failure requiring positive pressure ventilation. Patient continued on BiPAP, nebulizers, hypertonic saline, inhaled steroids and systemic steroids, started on empiric avycaz (s/p 2 doses, last dose 6AM 6/30). Pending sputum culture, CT chest. RVP negative, Hep C reactive viral load undetectable, HIV negative. ID consulted for management of suspected pseudomonas pneumonia.     Patient seen at bedside, on room air. AOx3, mentating well. Explained she is most bothered by continuous cough, productive of copious yellow mucous. She has never had any bloody mucous (states approximately 20-30 years ago had some bloody mucous, negative for Tb). Patient denies fevers, chills, weight loss, chest pain, abdominal pain, n/v/d/c.        REVIEW OF SYSTEMS  [  ] ROS unobtainable because:    [ x ] All other systems negative except as noted below    Constitutional:  [ ] fever [ ] chills  [ ] weight loss  [ ]night sweat  [ ]poor appetite/PO intake [ ]fatigue   Skin:  [ ] rash [ ] phlebitis	  Eyes: [ ] icterus [ ] pain  [ ] discharge	  ENMT: [ ] sore throat  [ ] thrush [ ] ulcers [ ] exudates [ ]anosmia  Respiratory: [ ] dyspnea [ ] hemoptysis [x ] cough [x ] sputum	  Cardiovascular:  [ ] chest pain [ ] palpitations [ ] edema	  Gastrointestinal:  [ ] nausea [ ] vomiting [ ] diarrhea [ ] constipation [ ] pain	  Genitourinary:  [ ] dysuria [ ] frequency [ ] hematuria [ ] discharge [ ] flank pain  [ ] incontinence  Musculoskeletal:  [ ] myalgias [ ] arthralgias [ ] arthritis  [ ] back pain  Neurological:  [ ] headache [ ] weakness [ ] seizures  [ ] confusion/altered mental status    prior hospital charts reviewed [V]  primary team notes reviewed [V]  other consultant notes reviewed [V]    PAST MEDICAL & SURGICAL HISTORY:  ABPA (allergic bronchopulmonary aspergillosis)    Bronchiectasis    Hypertension    Vitamin D deficiency    Microcytic anemia    GERD (gastroesophageal reflux disease)    Postnasal drip    Pseudomonas aeruginosa colonization    Allergic rhinitis    Recurrent pneumonia    Type 2 diabetes mellitus, with long-term current use of insulin    History of cholecystectomy        SOCIAL HISTORY:  - Denied smoking/vaping/alcohol/recreational drug use    FAMILY HISTORY:  Family history of diabetes mellitus  father    Family history of allergic rhinitis (Child, Child)  son, daughter    Family history of bronchitis  parents        Allergies  No Known Allergies        ANTIMICROBIALS:      ANTIMICROBIALS (past 90 days):  MEDICATIONS  (STANDING):  ceftazidime/avibactam IVPB   50 mL/Hr IV Intermittent (06-30-21 @ 05:42)   50 mL/Hr IV Intermittent (06-29-21 @ 22:03)        OTHER MEDS:   MEDICATIONS  (STANDING):  acetaminophen   Tablet .. 650 every 4 hours PRN  ALBUTerol    0.083% 2.5 every 6 hours PRN  albuterol/ipratropium for Nebulization 3 every 6 hours  amLODIPine   Tablet 5 daily  budesonide 160 MICROgram(s)/formoterol 4.5 MICROgram(s) Inhaler 2 two times a day  dextrose 40% Gel 15 once  dextrose 50% Injectable 25 once  dextrose 50% Injectable 12.5 once  dextrose 50% Injectable 25 once  enoxaparin Injectable 40 daily  famotidine    Tablet 40 at bedtime  glucagon  Injectable 1 once  guaiFENesin Oral Liquid (Sugar-Free) 100 every 6 hours PRN  insulin glargine Injectable (LANTUS) 10 at bedtime  insulin lispro (ADMELOG) corrective regimen sliding scale  three times a day before meals  insulin lispro (ADMELOG) corrective regimen sliding scale  at bedtime  mirtazapine 7.5 at bedtime  montelukast 10 daily  pantoprazole    Tablet 40 before breakfast  predniSONE   Tablet 40 daily  sodium chloride 3%  Inhalation 4 two times a day      VITALS:  Vital Signs Last 24 Hrs  T(F): 97.2 (06-29-21 @ 20:31), Max: 98.5 (06-29-21 @ 17:37)    Vital Signs Last 24 Hrs  HR: 84 (06-30-21 @ 07:37) (74 - 99)  BP: 145/71 (06-29-21 @ 20:31) (127/64 - 147/75)  RR: 22 (06-29-21 @ 20:31)  SpO2: 93% (06-30-21 @ 07:37) (93% - 100%)  Wt(kg): --    EXAM:  GA: NAD  HEENT: oral cavity no lesion  CV: nl S1/S2, no RMG  Lungs: diffuse rhonchi, patient's cough exacerbated by deep inhalaiton  Abd: BS+, soft, nontender, no rebounding pain  Ext: no edema  Skin:  IV: no phlebitis    Labs:                        12.1   9.06  )-----------( 180      ( 30 Jun 2021 08:17 )             40.2     06-30    137  |  93<L>  |  12  ----------------------------<  180<H>  4.4   |  37<H>  |  0.33<L>    Ca    9.6      30 Jun 2021 08:17  Phos  3.9     06-30  Mg     2.00     06-30    TPro  7.4  /  Alb  3.8  /  TBili  0.3  /  DBili  x   /  AST  42<H>  /  ALT  34<H>  /  AlkPhos  169<H>  06-29      WBC Trend:  WBC Count: 9.06 (06-30-21 @ 08:17)  WBC Count: 10.43 (06-29-21 @ 15:22)      Auto Neutrophil #: 7.11 K/uL (06-30-21 @ 08:17)  Auto Neutrophil #: 7.74 K/uL (06-29-21 @ 15:22)  Auto Neutrophil #: 5.70 K/uL (04-19-21 @ 21:35)  Auto Neutrophil #: 6.43 K/uL (04-02-21 @ 07:54)  Auto Neutrophil #: 6.67 K/uL (04-01-21 @ 08:07)      Creatine Trend:  Creatinine, Serum: 0.33 (06-30)  Creatinine, Serum: 0.37 (06-29)      Liver Biochemical Testing Trend:  Alanine Aminotransferase (ALT/SGPT): 34 *H* (06-29)  Alanine Aminotransferase (ALT/SGPT): 61 *H* (04-20)  Alanine Aminotransferase (ALT/SGPT): 58 *H* (04-19)  Alanine Aminotransferase (ALT/SGPT): 99 *H* (04-10)  Alanine Aminotransferase (ALT/SGPT): 116 *H* (04-09)  Aspartate Aminotransferase (AST/SGOT): 42 (06-29-21 @ 15:22)  Aspartate Aminotransferase (AST/SGOT): 56 (04-20-21 @ 06:43)  Aspartate Aminotransferase (AST/SGOT): 40 (04-19-21 @ 21:35)  Aspartate Aminotransferase (AST/SGOT): 45 (04-10-21 @ 07:00)  Aspartate Aminotransferase (AST/SGOT): 62 (04-09-21 @ 08:08)  Bilirubin Total, Serum: 0.3 (06-29)  Bilirubin Total, Serum: 0.3 (04-20)  Bilirubin Total, Serum: 0.3 (04-19)  Bilirubin Direct, Serum: <0.2 (04-10)  Bilirubin Total, Serum: 0.2 (04-10)      Trend LDH      Auto Eosinophil %: 0.0 % (06-30-21 @ 08:17)  Auto Eosinophil %: 1.2 % (06-29-21 @ 15:22)          MICROBIOLOGY:        Culture - Urine (collected 19 Apr 2021 22:34)  Source: .Urine Clean Catch (Midstream)  Final Report:    <10,000 CFU/mL Normal Urogenital Denise    Culture - Sputum (collected 04 Apr 2021 14:28)  Source: .Sputum Sputum  Final Report:    Rare Pseudomonas aeruginosa (Carbapenem Resistant)    Normal Respiratory Denise present  Organism: Pseudomonas aeruginosa (Carbapenem Resistant)    Sensitivities:      -  Amikacin: S <=16      -  Aztreonam: R >16      -  Cefepime: R >16      -  Ceftazidime: S 4      -  Ceftazidime/Avibactam: S <=4      -  Ceftolozane/tazobactam: S <=2      -  Ciprofloxacin: R >2      -  Gentamicin: S 4      -  Imipenem: R >8      -  Levofloxacin: R >4      -  Meropenem: I 4      -  Piperacillin/Tazobactam: I 32      -  Tobramycin: S <=2      Method Type: ELIEZER  Organism: Pseudomonas aeruginosa (Carbapenem Resistant)    Culture - Urine (collected 01 Apr 2021 15:58)  Source: .Urine Clean Catch (Midstream)  Final Report:    >=3 organisms. Probable collection contamination.    Culture - Blood (collected 31 Mar 2021 19:30)  Source: .Blood Blood-Peripheral  Final Report:    No Growth Final    Culture - Blood (collected 31 Mar 2021 19:15)  Source: .Blood Blood-Peripheral  Final Report:    No Growth Final    Culture - Fungal, Sputum (collected 14 Aug 2020 17:38)  Source: .Sputum Sputum  Final Report:    Few Yeast    Culture - Sputum (collected 11 Aug 2020 17:30)  Source: .Sputum Sputum  Final Report:    Moderate Pseudomonas aeruginosa    Normal Respiratory Denise present  Organism: Pseudomonas aeruginosa  Organism: Pseudomonas aeruginosa    Sensitivities:      -  Amikacin: S <=16      -  Aztreonam: S <=4      -  Cefepime: S <=2      -  Ceftazidime: S <=1      -  Ciprofloxacin: I 1      -  Gentamicin: S <=2      -  Imipenem: S <=1      -  Levofloxacin: S <=0.5      -  Meropenem: S <=1      -  Piperacillin/Tazobactam: S <=8      -  Tobramycin: S <=2      Method Type: ELIEZER    Culture - Blood (collected 11 Aug 2020 15:30)  Source: .Blood Blood-Peripheral  Final Report:    No Growth Final    Culture - Blood (collected 11 Aug 2020 15:28)  Source: .Blood Blood-Venous  Final Report:    No Growth Final    Culture - Respiratory with Gram Stain (collected 14 Jan 2020 17:11)  Source: SPUTUM  Final Report:    Normal Respiratory Denise Also Present  Organism: Pseudomonas aeruginosa  Organism: Pseudomonas aeruginosa  QUANTITY OF GROWTH: RARE    Sensitivities:      -  Amikacin: S <=8 ELIEZRE      -  Aztreonam: S <=4 ELEIZER      -  Cefepime: S <=2 ELIEZER      -  Ceftazidime: S <=1 ELIEZER      -  Gentamicin: S 2 ELIEZER      -  Imipenem: I 4 ELIEZER      -  Levofloxacin: S <=1 ELIEZER      -  Meropenem: S <=1 ELIEZER      -  Piperacillin/Tazobactam: S      -  Tobramycin: S <=2 ELIEZER      Method Type: NEGATIVE ELIEZER 43      HIV-1/2 Combo Result: Nonreact (04-08-21 @ 07:36)        Rapid RVP Result: NotDetec (06-29 @ 15:50)      COVID-19 Otoniel Domain AB Interp: Negative (04-21-21 @ 09:47)  COVID-19 Otoniel Domain AB Interp: Negative (04-02-21 @ 22:27)  COVID-19 IgG Antibody Interpretation: Negative (08-12-20 @ 05:09)      Procalcitonin, Serum: 0.07 (06-29)            Serum Pro-Brain Natriuretic Peptide: 258 (06-29)    Troponin T, High Sensitivity Result: 6 (06-29)    Blood Gas Venous - Lactate: 0.9 (06-29 @ 18:01)    A1C with Estimated Average Glucose Result: 8.9 % (06-30-21 @ 07:28)  A1C with Estimated Average Glucose Result: 8.5 % (04-08-21 @ 07:36)  A1C with Estimated Average Glucose Result: 8.2 % (04-01-21 @ 15:25)      RADIOLOGY:  imaging below personally reviewed    < from: Xray Chest 2 Views PA/Lat (06.29.21 @ 17:14) >  IMPRESSION:  Bilateral coarse interstitial opacities consistent with chronicbronchiectasis/bronchial wall thickening. Otherwise, lungs are clear.    < end of copied text >     Patient is a 66y old  Female who presents with a chief complaint of SOB (30 Jun 2021 06:51)    [facility] : [id number]  HPI:  66 y.o. 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 Patient was recently hospitalized in April 2021 for Carbapenem resistant Pseudomonas treated with 1 week of Avycaz and inhaled Tobramycin. She was discharged home and has been taking Tobramycin inhalation 1x every other month. Since discharge, patient has become increasingly more lethargic and weak. She can only get out of bed with significant assistance and can no longer ambulate independently. She is able to tolerate about 50% of her usual PO intake. Patient's son notes she has been much weaker, more confused and has not been answering questions appropriately. She was started on a Prednisone taper for unclear reasons about 10 days ago (20mg x 5, 15mg x 5, 10mg x 5 days). Over the last two weeks, patient's son notes worsening SOB, worsening cough over the last few days with thicker yellow mucus production, chills and night sweats. Patient endorses sinus congestion. Patient unable to answer many questions and lethargic on evaluation  In the ED, patient was hypoxic on her home 2L O2, now on 3L O2. She was noted to have diffuse expiratory wheezing, given methylprednisolone and nebulizer.  (29 Jun 2021 18:58)    Patient admitted for hypercapnic respiratory failure requiring positive pressure ventilation. Patient continued on BiPAP, nebulizers, hypertonic saline, inhaled steroids and systemic steroids, started on empiric avycaz (s/p 2 doses, last dose 6AM 6/30). Pending sputum culture sent 6/30,  (sputum culture also obtained from outpatient pulmonary clinic still pending), CT chest pending. RVP negative, Hep C reactive viral load undetectable, HIV negative. ID consulted for management of suspected pseudomonas pneumonia.     Patient seen at bedside, on room air. AOx3, mentating well. Explained she is most bothered by continuous cough, productive of copious thick yellow mucous. She has never had any bloody mucous (states approximately 20-30 years ago had some bloody mucous, has never tested positive for Tb, confirmed also with daughter). Patient denies fevers, chills, weight loss (however upon interview with daughter, she has lost 10 pounds since April admission), chest pain, abdominal pain, n/v/d/c.        REVIEW OF SYSTEMS  [  ] ROS unobtainable because:    [ x ] All other systems negative except as noted below    Constitutional:  [ ] fever [ ] chills  [ ] weight loss  [ ]night sweat  [ ]poor appetite/PO intake [ ]fatigue   Skin:  [ ] rash [ ] phlebitis	  Eyes: [ ] icterus [ ] pain  [ ] discharge	  ENMT: [ ] sore throat  [ ] thrush [ ] ulcers [ ] exudates [ ]anosmia  Respiratory: [ ] dyspnea [ ] hemoptysis [x ] cough [x ] sputum	  Cardiovascular:  [ ] chest pain [ ] palpitations [ ] edema	  Gastrointestinal:  [ ] nausea [ ] vomiting [ ] diarrhea [ ] constipation [ ] pain	  Genitourinary:  [ ] dysuria [ ] frequency [ ] hematuria [ ] discharge [ ] flank pain  [ ] incontinence  Musculoskeletal:  [ ] myalgias [ ] arthralgias [ ] arthritis  [ ] back pain  Neurological:  [ ] headache [ ] weakness [ ] seizures  [ ] confusion/altered mental status    prior hospital charts reviewed [V]  primary team notes reviewed [V]  other consultant notes reviewed [V]    PAST MEDICAL & SURGICAL HISTORY:  ABPA (allergic bronchopulmonary aspergillosis)    Bronchiectasis    Hypertension    Vitamin D deficiency    Microcytic anemia    GERD (gastroesophageal reflux disease)    Postnasal drip    Pseudomonas aeruginosa colonization    Allergic rhinitis    Recurrent pneumonia    Type 2 diabetes mellitus, with long-term current use of insulin    History of cholecystectomy        SOCIAL HISTORY:  - Denied smoking/vaping/alcohol/recreational drug use    FAMILY HISTORY:  Family history of diabetes mellitus  father    Family history of allergic rhinitis (Child, Child)  son, daughter    Family history of bronchitis  parents        Allergies  No Known Allergies        ANTIMICROBIALS:      ANTIMICROBIALS (past 90 days):  MEDICATIONS  (STANDING):  ceftazidime/avibactam IVPB   50 mL/Hr IV Intermittent (06-30-21 @ 05:42)   50 mL/Hr IV Intermittent (06-29-21 @ 22:03)        OTHER MEDS:   MEDICATIONS  (STANDING):  acetaminophen   Tablet .. 650 every 4 hours PRN  ALBUTerol    0.083% 2.5 every 6 hours PRN  albuterol/ipratropium for Nebulization 3 every 6 hours  amLODIPine   Tablet 5 daily  budesonide 160 MICROgram(s)/formoterol 4.5 MICROgram(s) Inhaler 2 two times a day  dextrose 40% Gel 15 once  dextrose 50% Injectable 25 once  dextrose 50% Injectable 12.5 once  dextrose 50% Injectable 25 once  enoxaparin Injectable 40 daily  famotidine    Tablet 40 at bedtime  glucagon  Injectable 1 once  guaiFENesin Oral Liquid (Sugar-Free) 100 every 6 hours PRN  insulin glargine Injectable (LANTUS) 10 at bedtime  insulin lispro (ADMELOG) corrective regimen sliding scale  three times a day before meals  insulin lispro (ADMELOG) corrective regimen sliding scale  at bedtime  mirtazapine 7.5 at bedtime  montelukast 10 daily  pantoprazole    Tablet 40 before breakfast  predniSONE   Tablet 40 daily  sodium chloride 3%  Inhalation 4 two times a day      VITALS:  Vital Signs Last 24 Hrs  T(F): 97.2 (06-29-21 @ 20:31), Max: 98.5 (06-29-21 @ 17:37)    Vital Signs Last 24 Hrs  HR: 84 (06-30-21 @ 07:37) (74 - 99)  BP: 145/71 (06-29-21 @ 20:31) (127/64 - 147/75)  RR: 22 (06-29-21 @ 20:31)  SpO2: 93% (06-30-21 @ 07:37) (93% - 100%)  Wt(kg): --    EXAM:  GA: NAD  HEENT: oral cavity no lesion  CV: nl S1/S2, no RMG  Lungs: diffuse rhonchi, patient's cough exacerbated by deep inhalaiton  Abd: BS+, soft, nontender, no rebounding pain  Ext: no edema  Skin:  IV: no phlebitis    Labs:                        12.1   9.06  )-----------( 180      ( 30 Jun 2021 08:17 )             40.2     06-30    137  |  93<L>  |  12  ----------------------------<  180<H>  4.4   |  37<H>  |  0.33<L>    Ca    9.6      30 Jun 2021 08:17  Phos  3.9     06-30  Mg     2.00     06-30    TPro  7.4  /  Alb  3.8  /  TBili  0.3  /  DBili  x   /  AST  42<H>  /  ALT  34<H>  /  AlkPhos  169<H>  06-29      WBC Trend:  WBC Count: 9.06 (06-30-21 @ 08:17)  WBC Count: 10.43 (06-29-21 @ 15:22)      Auto Neutrophil #: 7.11 K/uL (06-30-21 @ 08:17)  Auto Neutrophil #: 7.74 K/uL (06-29-21 @ 15:22)  Auto Neutrophil #: 5.70 K/uL (04-19-21 @ 21:35)  Auto Neutrophil #: 6.43 K/uL (04-02-21 @ 07:54)  Auto Neutrophil #: 6.67 K/uL (04-01-21 @ 08:07)      Creatine Trend:  Creatinine, Serum: 0.33 (06-30)  Creatinine, Serum: 0.37 (06-29)      Liver Biochemical Testing Trend:  Alanine Aminotransferase (ALT/SGPT): 34 *H* (06-29)  Alanine Aminotransferase (ALT/SGPT): 61 *H* (04-20)  Alanine Aminotransferase (ALT/SGPT): 58 *H* (04-19)  Alanine Aminotransferase (ALT/SGPT): 99 *H* (04-10)  Alanine Aminotransferase (ALT/SGPT): 116 *H* (04-09)  Aspartate Aminotransferase (AST/SGOT): 42 (06-29-21 @ 15:22)  Aspartate Aminotransferase (AST/SGOT): 56 (04-20-21 @ 06:43)  Aspartate Aminotransferase (AST/SGOT): 40 (04-19-21 @ 21:35)  Aspartate Aminotransferase (AST/SGOT): 45 (04-10-21 @ 07:00)  Aspartate Aminotransferase (AST/SGOT): 62 (04-09-21 @ 08:08)  Bilirubin Total, Serum: 0.3 (06-29)  Bilirubin Total, Serum: 0.3 (04-20)  Bilirubin Total, Serum: 0.3 (04-19)  Bilirubin Direct, Serum: <0.2 (04-10)  Bilirubin Total, Serum: 0.2 (04-10)      Trend LDH      Auto Eosinophil %: 0.0 % (06-30-21 @ 08:17)  Auto Eosinophil %: 1.2 % (06-29-21 @ 15:22)          MICROBIOLOGY:        Culture - Urine (collected 19 Apr 2021 22:34)  Source: .Urine Clean Catch (Midstream)  Final Report:    <10,000 CFU/mL Normal Urogenital Denise    Culture - Sputum (collected 04 Apr 2021 14:28)  Source: .Sputum Sputum  Final Report:    Rare Pseudomonas aeruginosa (Carbapenem Resistant)    Normal Respiratory Denise present  Organism: Pseudomonas aeruginosa (Carbapenem Resistant)    Sensitivities:      -  Amikacin: S <=16      -  Aztreonam: R >16      -  Cefepime: R >16      -  Ceftazidime: S 4      -  Ceftazidime/Avibactam: S <=4      -  Ceftolozane/tazobactam: S <=2      -  Ciprofloxacin: R >2      -  Gentamicin: S 4      -  Imipenem: R >8      -  Levofloxacin: R >4      -  Meropenem: I 4      -  Piperacillin/Tazobactam: I 32      -  Tobramycin: S <=2      Method Type: ELIEZER  Organism: Pseudomonas aeruginosa (Carbapenem Resistant)    Culture - Urine (collected 01 Apr 2021 15:58)  Source: .Urine Clean Catch (Midstream)  Final Report:    >=3 organisms. Probable collection contamination.    Culture - Blood (collected 31 Mar 2021 19:30)  Source: .Blood Blood-Peripheral  Final Report:    No Growth Final    Culture - Blood (collected 31 Mar 2021 19:15)  Source: .Blood Blood-Peripheral  Final Report:    No Growth Final    Culture - Fungal, Sputum (collected 14 Aug 2020 17:38)  Source: .Sputum Sputum  Final Report:    Few Yeast    Culture - Sputum (collected 11 Aug 2020 17:30)  Source: .Sputum Sputum  Final Report:    Moderate Pseudomonas aeruginosa    Normal Respiratory Denise present  Organism: Pseudomonas aeruginosa  Organism: Pseudomonas aeruginosa    Sensitivities:      -  Amikacin: S <=16      -  Aztreonam: S <=4      -  Cefepime: S <=2      -  Ceftazidime: S <=1      -  Ciprofloxacin: I 1      -  Gentamicin: S <=2      -  Imipenem: S <=1      -  Levofloxacin: S <=0.5      -  Meropenem: S <=1      -  Piperacillin/Tazobactam: S <=8      -  Tobramycin: S <=2      Method Type: ELIEZER    Culture - Blood (collected 11 Aug 2020 15:30)  Source: .Blood Blood-Peripheral  Final Report:    No Growth Final    Culture - Blood (collected 11 Aug 2020 15:28)  Source: .Blood Blood-Venous  Final Report:    No Growth Final    Culture - Respiratory with Gram Stain (collected 14 Jan 2020 17:11)  Source: SPUTUM  Final Report:    Normal Respiratory Denise Also Present  Organism: Pseudomonas aeruginosa  Organism: Pseudomonas aeruginosa  QUANTITY OF GROWTH: RARE    Sensitivities:      -  Amikacin: S <=8 ELIEZER      -  Aztreonam: S <=4 ELIEZER      -  Cefepime: S <=2 ELIEZER      -  Ceftazidime: S <=1 ELIEZER      -  Gentamicin: S 2 ELIEZER      -  Imipenem: I 4 ELIEZER      -  Levofloxacin: S <=1 ELIEZER      -  Meropenem: S <=1 ELIEZER      -  Piperacillin/Tazobactam: S      -  Tobramycin: S <=2 ELIEZER      Method Type: NEGATIVE ELIEZER 43      HIV-1/2 Combo Result: Nonreact (04-08-21 @ 07:36)        Rapid RVP Result: NotDetec (06-29 @ 15:50)      COVID-19 Otoniel Domain AB Interp: Negative (04-21-21 @ 09:47)  COVID-19 Otoniel Domain AB Interp: Negative (04-02-21 @ 22:27)  COVID-19 IgG Antibody Interpretation: Negative (08-12-20 @ 05:09)      Procalcitonin, Serum: 0.07 (06-29)            Serum Pro-Brain Natriuretic Peptide: 258 (06-29)    Troponin T, High Sensitivity Result: 6 (06-29)    Blood Gas Venous - Lactate: 0.9 (06-29 @ 18:01)    A1C with Estimated Average Glucose Result: 8.9 % (06-30-21 @ 07:28)  A1C with Estimated Average Glucose Result: 8.5 % (04-08-21 @ 07:36)  A1C with Estimated Average Glucose Result: 8.2 % (04-01-21 @ 15:25)      RADIOLOGY:  imaging below personally reviewed    < from: Xray Chest 2 Views PA/Lat (06.29.21 @ 17:14) >  IMPRESSION:  Bilateral coarse interstitial opacities consistent with chronicbronchiectasis/bronchial wall thickening. Otherwise, lungs are clear.    < end of copied text >

## 2021-06-30 NOTE — PROGRESS NOTE ADULT - ATTENDING COMMENTS
Agree with plan as outlined above. Patient seen and examined at bedside. Patient history, laboratory data, and imaging personally reviewed.     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     Pt initially p/w acute hypercapnic respiratory failure requiring NIV Bilevel support and transitioned to AVAPS this morning with significant clinical improvement and improved ABG. AVAPS removed this morning, will CTM on NC, c/w AVAPS qhs and prn for now. Pt also with thick and sticky mucous with difficulty expectorating, similar to prior exacerbations this year as per daughter. Airway clearance therapy initiated with DuoNebs followed by 3% saline nebs with IPV/MetaNebs q4-6hrs. Will also given pt Acapella and incentive spirometry at bedside for independent use. Home inhaler regimen: Albuterol+ Hypertonic saline + Aerobika TID as well as Symbicort BID+ Spiriva qhs with nebulized tobramycin every other month (given through 6/29). Chest CT and sinuses pending today. Prednisone 40mg QD for ILD exacerbation.     Repeat sputum cx pending from this admission. O/P sputum cx from 6/29 in [employer] (+) GNR, but not yet speciated. Prior sputum cx from 4/4/21 shows carbapenem resistant Pseudomonas (sensitive to tobramycin and amikacin). Pt with multiple prior sputum cx (+) for Pseudomonas, but more sensitive. Pt also with (+) Stenotrophomonas (R: Levofloxacin and Ceftazidime; S: Bactrim) on 7/31/18. AFB (-) on multiple occasions. ID consulted overnight, pt initiated on Avycaz IV + nebulized tobramycin. Will discuss need for chronic management with alternating 2nd nebulized Abx with tobramycin as well.     Nutrition consulted. Glucerna added to soft diet this morning. Swallow evaluation with MBS wnl on prior admission. Hepatology consult placed for hx cirrhosis and possibility of ultimate lung transplant referral. DMII management with ISS and BGFS. DVT ppx with Lovenox. GI ppx in place.     Dispo pending medical optimization. Pt full code. Discussed plan with pt and daughter at bedside. PT consult pending clinical improvement.

## 2021-06-30 NOTE — PROGRESS NOTE ADULT - SUBJECTIVE AND OBJECTIVE BOX
CHIEF COMPLAINT: Patient is a 66y old  Female who presents with a chief complaint of SOB (29 Jun 2021 19:35)      Interval Events: Pt seen at bedside.  Admitted from ED     REVIEW OF SYSTEMS:  Constitutional:   Eyes:  ENT:  CV:  Resp:  GI:  :  MSK:  Integumentary:  Neurological:  Psychiatric:  Endocrine:  Hematologic/Lymphatic:  Allergic/Immunologic:  [ ] All other systems negative  [ ] Unable to assess ROS because ________    OBJECTIVE:  ICU Vital Signs Last 24 Hrs  T(C): 36.2 (29 Jun 2021 20:31), Max: 36.9 (29 Jun 2021 14:15)  T(F): 97.2 (29 Jun 2021 20:31), Max: 98.5 (29 Jun 2021 17:37)  HR: 80 (30 Jun 2021 03:07) (74 - 99)  BP: 145/71 (29 Jun 2021 20:31) (127/64 - 147/75)  BP(mean): --  ABP: --  ABP(mean): --  RR: 22 (29 Jun 2021 20:31) (22 - 25)  SpO2: 94% (30 Jun 2021 03:07) (94% - 100%)        CAPILLARY BLOOD GLUCOSE      POCT Blood Glucose.: 335 mg/dL (29 Jun 2021 21:54)      PHYSICAL EXAM:  General:   HEENT:   Lymph Nodes:  Neck:   Respiratory:   Cardiovascular:   Abdomen:   Extremities:   Skin:   Neurological:  Psychiatry:    HOSPITAL MEDICATIONS:  MEDICATIONS  (STANDING):  albuterol/ipratropium for Nebulization 3 milliLiter(s) Nebulizer every 6 hours  amLODIPine   Tablet 5 milliGRAM(s) Oral daily  ascorbic acid 500 milliGRAM(s) Oral daily  budesonide 160 MICROgram(s)/formoterol 4.5 MICROgram(s) Inhaler 2 Puff(s) Inhalation two times a day  dextrose 40% Gel 15 Gram(s) Oral once  dextrose 5%. 1000 milliLiter(s) (50 mL/Hr) IV Continuous <Continuous>  dextrose 5%. 1000 milliLiter(s) (100 mL/Hr) IV Continuous <Continuous>  dextrose 50% Injectable 25 Gram(s) IV Push once  dextrose 50% Injectable 12.5 Gram(s) IV Push once  dextrose 50% Injectable 25 Gram(s) IV Push once  enoxaparin Injectable 40 milliGRAM(s) SubCutaneous daily  famotidine    Tablet 40 milliGRAM(s) Oral at bedtime  ferrous    sulfate 325 milliGRAM(s) Oral daily  glucagon  Injectable 1 milliGRAM(s) IntraMuscular once  insulin glargine Injectable (LANTUS) 10 Unit(s) SubCutaneous at bedtime  insulin lispro (ADMELOG) corrective regimen sliding scale   SubCutaneous three times a day before meals  insulin lispro (ADMELOG) corrective regimen sliding scale   SubCutaneous at bedtime  mirtazapine 7.5 milliGRAM(s) Oral at bedtime  montelukast 10 milliGRAM(s) Oral daily  pantoprazole    Tablet 40 milliGRAM(s) Oral before breakfast  predniSONE   Tablet 40 milliGRAM(s) Oral daily  sodium chloride 3%  Inhalation 4 milliLiter(s) Inhalation two times a day    MEDICATIONS  (PRN):  acetaminophen   Tablet .. 650 milliGRAM(s) Oral every 4 hours PRN Mild Pain (1 - 3)  ALBUTerol    0.083% 2.5 milliGRAM(s) Nebulizer every 6 hours PRN Shortness of Breath and/or Wheezing  guaiFENesin Oral Liquid (Sugar-Free) 100 milliGRAM(s) Oral every 6 hours PRN Cough      LABS:                        13.0   10.43 )-----------( 204      ( 29 Jun 2021 15:22 )             44.7     06-29    132<L>  |  89<L>  |  10  ----------------------------<  260<H>  4.4   |  38<H>  |  0.37<L>    Ca    9.5      29 Jun 2021 15:22    TPro  7.4  /  Alb  3.8  /  TBili  0.3  /  DBili  x   /  AST  42<H>  /  ALT  34<H>  /  AlkPhos  169<H>  06-29        Arterial Blood Gas:  06-30 @ 05:16  7.29/94/175/38/99.2/16.8  ABG lactate: --  Arterial Blood Gas:  06-30 @ 00:15  7.37/76/134/38/99.1/16.3  ABG lactate: --  Arterial Blood Gas:  06-29 @ 21:08  7.28/92/109/36/98.3/14.9  ABG lactate: --    Venous Blood Gas:  06-29 @ 18:01  7.17/>110/156/34/98.6  VBG Lactate: 0.9      MICROBIOLOGY:     RADIOLOGY:  [ ] Reviewed and interpreted by me    PULMONARY FUNCTION TESTS:    EKG: CHIEF COMPLAINT: Patient is a 66y old  Female who presents with a chief complaint of SOB (29 Jun 2021 19:35)      Interval Events: Pt seen at bedside.  Admitted from ED last pm     REVIEW OF SYSTEMS:  Constitutional: Denies pain /fever  CV: Denies   Resp: + SOB   GI: Loss of appetite recently   [x ] All other systems negative      OBJECTIVE:  ICU Vital Signs Last 24 Hrs  T(C): 36.2 (29 Jun 2021 20:31), Max: 36.9 (29 Jun 2021 14:15)  T(F): 97.2 (29 Jun 2021 20:31), Max: 98.5 (29 Jun 2021 17:37)  HR: 80 (30 Jun 2021 03:07) (74 - 99)  BP: 145/71 (29 Jun 2021 20:31) (127/64 - 147/75)  BP(mean): --  ABP: --  ABP(mean): --  RR: 22 (29 Jun 2021 20:31) (22 - 25)  SpO2: 94% (30 Jun 2021 03:07) (94% - 100%)        CAPILLARY BLOOD GLUCOSE      POCT Blood Glucose.: 335 mg/dL (29 Jun 2021 21:54)      PHYSICAL EXAM:  General:   HEENT:   Lymph Nodes:  Neck:   Respiratory:   Cardiovascular:   Abdomen:   Extremities:   Skin:   Neurological:  Psychiatry:    HOSPITAL MEDICATIONS:  MEDICATIONS  (STANDING):  albuterol/ipratropium for Nebulization 3 milliLiter(s) Nebulizer every 6 hours  amLODIPine   Tablet 5 milliGRAM(s) Oral daily  ascorbic acid 500 milliGRAM(s) Oral daily  budesonide 160 MICROgram(s)/formoterol 4.5 MICROgram(s) Inhaler 2 Puff(s) Inhalation two times a day  dextrose 40% Gel 15 Gram(s) Oral once  dextrose 5%. 1000 milliLiter(s) (50 mL/Hr) IV Continuous <Continuous>  dextrose 5%. 1000 milliLiter(s) (100 mL/Hr) IV Continuous <Continuous>  dextrose 50% Injectable 25 Gram(s) IV Push once  dextrose 50% Injectable 12.5 Gram(s) IV Push once  dextrose 50% Injectable 25 Gram(s) IV Push once  enoxaparin Injectable 40 milliGRAM(s) SubCutaneous daily  famotidine    Tablet 40 milliGRAM(s) Oral at bedtime  ferrous    sulfate 325 milliGRAM(s) Oral daily  glucagon  Injectable 1 milliGRAM(s) IntraMuscular once  insulin glargine Injectable (LANTUS) 10 Unit(s) SubCutaneous at bedtime  insulin lispro (ADMELOG) corrective regimen sliding scale   SubCutaneous three times a day before meals  insulin lispro (ADMELOG) corrective regimen sliding scale   SubCutaneous at bedtime  mirtazapine 7.5 milliGRAM(s) Oral at bedtime  montelukast 10 milliGRAM(s) Oral daily  pantoprazole    Tablet 40 milliGRAM(s) Oral before breakfast  predniSONE   Tablet 40 milliGRAM(s) Oral daily  sodium chloride 3%  Inhalation 4 milliLiter(s) Inhalation two times a day    MEDICATIONS  (PRN):  acetaminophen   Tablet .. 650 milliGRAM(s) Oral every 4 hours PRN Mild Pain (1 - 3)  ALBUTerol    0.083% 2.5 milliGRAM(s) Nebulizer every 6 hours PRN Shortness of Breath and/or Wheezing  guaiFENesin Oral Liquid (Sugar-Free) 100 milliGRAM(s) Oral every 6 hours PRN Cough      LABS:                        13.0   10.43 )-----------( 204      ( 29 Jun 2021 15:22 )             44.7     06-29    132<L>  |  89<L>  |  10  ----------------------------<  260<H>  4.4   |  38<H>  |  0.37<L>    Ca    9.5      29 Jun 2021 15:22    TPro  7.4  /  Alb  3.8  /  TBili  0.3  /  DBili  x   /  AST  42<H>  /  ALT  34<H>  /  AlkPhos  169<H>  06-29        Arterial Blood Gas:  06-30 @ 05:16  7.29/94/175/38/99.2/16.8  ABG lactate: --  Arterial Blood Gas:  06-30 @ 00:15  7.37/76/134/38/99.1/16.3  ABG lactate: --  Arterial Blood Gas:  06-29 @ 21:08  7.28/92/109/36/98.3/14.9  ABG lactate: --    Venous Blood Gas:  06-29 @ 18:01  7.17/>110/156/34/98.6  VBG Lactate: 0.9      MICROBIOLOGY:     RADIOLOGY:  [ ] Reviewed and interpreted by me    PULMONARY FUNCTION TESTS:    EKG:

## 2021-06-30 NOTE — PHYSICAL THERAPY INITIAL EVALUATION ADULT - DIAGNOSIS, PT EVAL
Pt admitted for hypercarbic respiratory failure; pt presents with decreased strength, decreased balance, and decreased aerobic capacity/endurance.

## 2021-06-30 NOTE — PHYSICAL THERAPY INITIAL EVALUATION ADULT - ADDITIONAL COMMENTS
Pt lives in a private house with her , son, daughter in law, and grandchildren with~6-7 steps to enter; (+)bilateral handrails; bedroom/bathroom is on the first floor. Prior to hospital admission pt required assistance with ambulation. Pt owns a rolling walker and single axis cane. Pt denies any recent falls and used O2 @ home.    Pt left comfortable in bed, NAD, all lines intact, all precautions maintained, with call bell in reach, family @bedside, and RN aware.

## 2021-07-01 LAB
ANION GAP SERPL CALC-SCNC: 5 MMOL/L — LOW (ref 7–14)
BUN SERPL-MCNC: 11 MG/DL — SIGNIFICANT CHANGE UP (ref 7–23)
CALCIUM SERPL-MCNC: 9.1 MG/DL — SIGNIFICANT CHANGE UP (ref 8.4–10.5)
CHLORIDE SERPL-SCNC: 95 MMOL/L — LOW (ref 98–107)
CO2 SERPL-SCNC: 40 MMOL/L — HIGH (ref 22–31)
CREAT SERPL-MCNC: 0.38 MG/DL — LOW (ref 0.5–1.3)
GLUCOSE BLDC GLUCOMTR-MCNC: 137 MG/DL — HIGH (ref 70–99)
GLUCOSE BLDC GLUCOMTR-MCNC: 325 MG/DL — HIGH (ref 70–99)
GLUCOSE BLDC GLUCOMTR-MCNC: 391 MG/DL — HIGH (ref 70–99)
GLUCOSE BLDC GLUCOMTR-MCNC: 442 MG/DL — HIGH (ref 70–99)
GLUCOSE SERPL-MCNC: 123 MG/DL — HIGH (ref 70–99)
HCT VFR BLD CALC: 39.8 % — SIGNIFICANT CHANGE UP (ref 34.5–45)
HGB BLD-MCNC: 12.2 G/DL — SIGNIFICANT CHANGE UP (ref 11.5–15.5)
MAGNESIUM SERPL-MCNC: 2 MG/DL — SIGNIFICANT CHANGE UP (ref 1.6–2.6)
MCHC RBC-ENTMCNC: 27.6 PG — SIGNIFICANT CHANGE UP (ref 27–34)
MCHC RBC-ENTMCNC: 30.7 GM/DL — LOW (ref 32–36)
MCV RBC AUTO: 90 FL — SIGNIFICANT CHANGE UP (ref 80–100)
NRBC # BLD: 0 /100 WBCS — SIGNIFICANT CHANGE UP
NRBC # FLD: 0 K/UL — SIGNIFICANT CHANGE UP
PHOSPHATE SERPL-MCNC: 2.6 MG/DL — SIGNIFICANT CHANGE UP (ref 2.5–4.5)
PLATELET # BLD AUTO: 183 K/UL — SIGNIFICANT CHANGE UP (ref 150–400)
POTASSIUM SERPL-MCNC: 3.5 MMOL/L — SIGNIFICANT CHANGE UP (ref 3.5–5.3)
POTASSIUM SERPL-SCNC: 3.5 MMOL/L — SIGNIFICANT CHANGE UP (ref 3.5–5.3)
RBC # BLD: 4.42 M/UL — SIGNIFICANT CHANGE UP (ref 3.8–5.2)
RBC # FLD: 13.3 % — SIGNIFICANT CHANGE UP (ref 10.3–14.5)
SODIUM SERPL-SCNC: 140 MMOL/L — SIGNIFICANT CHANGE UP (ref 135–145)
WBC # BLD: 9.85 K/UL — SIGNIFICANT CHANGE UP (ref 3.8–10.5)
WBC # FLD AUTO: 9.85 K/UL — SIGNIFICANT CHANGE UP (ref 3.8–10.5)

## 2021-07-01 PROCEDURE — 99232 SBSQ HOSP IP/OBS MODERATE 35: CPT | Mod: GC

## 2021-07-01 PROCEDURE — 99223 1ST HOSP IP/OBS HIGH 75: CPT | Mod: GC

## 2021-07-01 PROCEDURE — 99223 1ST HOSP IP/OBS HIGH 75: CPT | Mod: GC,25

## 2021-07-01 RX ORDER — ACETYLCYSTEINE 200 MG/ML
4 VIAL (ML) MISCELLANEOUS THREE TIMES A DAY
Refills: 0 | Status: DISCONTINUED | OUTPATIENT
Start: 2021-07-01 | End: 2021-07-02

## 2021-07-01 RX ORDER — INSULIN LISPRO 100/ML
3 VIAL (ML) SUBCUTANEOUS
Refills: 0 | Status: DISCONTINUED | OUTPATIENT
Start: 2021-07-01 | End: 2021-07-02

## 2021-07-01 RX ORDER — INSULIN LISPRO 100/ML
VIAL (ML) SUBCUTANEOUS
Refills: 0 | Status: DISCONTINUED | OUTPATIENT
Start: 2021-07-01 | End: 2021-07-17

## 2021-07-01 RX ORDER — INSULIN LISPRO 100/ML
5 VIAL (ML) SUBCUTANEOUS
Refills: 0 | Status: DISCONTINUED | OUTPATIENT
Start: 2021-07-01 | End: 2021-07-02

## 2021-07-01 RX ORDER — INSULIN LISPRO 100/ML
VIAL (ML) SUBCUTANEOUS AT BEDTIME
Refills: 0 | Status: DISCONTINUED | OUTPATIENT
Start: 2021-07-01 | End: 2021-07-17

## 2021-07-01 RX ADMIN — BUDESONIDE AND FORMOTEROL FUMARATE DIHYDRATE 2 PUFF(S): 160; 4.5 AEROSOL RESPIRATORY (INHALATION) at 09:43

## 2021-07-01 RX ADMIN — MIRTAZAPINE 7.5 MILLIGRAM(S): 45 TABLET, ORALLY DISINTEGRATING ORAL at 21:27

## 2021-07-01 RX ADMIN — BUDESONIDE AND FORMOTEROL FUMARATE DIHYDRATE 2 PUFF(S): 160; 4.5 AEROSOL RESPIRATORY (INHALATION) at 21:39

## 2021-07-01 RX ADMIN — Medication 6: at 21:27

## 2021-07-01 RX ADMIN — SODIUM CHLORIDE 4 MILLILITER(S): 9 INJECTION INTRAMUSCULAR; INTRAVENOUS; SUBCUTANEOUS at 09:55

## 2021-07-01 RX ADMIN — Medication 1 SPRAY(S): at 05:51

## 2021-07-01 RX ADMIN — FAMOTIDINE 40 MILLIGRAM(S): 10 INJECTION INTRAVENOUS at 21:27

## 2021-07-01 RX ADMIN — Medication 3 MILLILITER(S): at 09:43

## 2021-07-01 RX ADMIN — SODIUM CHLORIDE 4 MILLILITER(S): 9 INJECTION INTRAMUSCULAR; INTRAVENOUS; SUBCUTANEOUS at 03:36

## 2021-07-01 RX ADMIN — INSULIN GLARGINE 10 UNIT(S): 100 INJECTION, SOLUTION SUBCUTANEOUS at 21:26

## 2021-07-01 RX ADMIN — AMLODIPINE BESYLATE 5 MILLIGRAM(S): 2.5 TABLET ORAL at 05:47

## 2021-07-01 RX ADMIN — MONTELUKAST 10 MILLIGRAM(S): 4 TABLET, CHEWABLE ORAL at 11:35

## 2021-07-01 RX ADMIN — Medication 3 MILLILITER(S): at 03:36

## 2021-07-01 RX ADMIN — Medication 300 MILLIGRAM(S): at 09:44

## 2021-07-01 RX ADMIN — Medication 40 MILLIGRAM(S): at 05:51

## 2021-07-01 RX ADMIN — Medication 1 SPRAY(S): at 17:32

## 2021-07-01 RX ADMIN — Medication 3 MILLILITER(S): at 14:41

## 2021-07-01 RX ADMIN — Medication 500 MILLIGRAM(S): at 11:35

## 2021-07-01 RX ADMIN — Medication 12: at 17:31

## 2021-07-01 RX ADMIN — ENOXAPARIN SODIUM 40 MILLIGRAM(S): 100 INJECTION SUBCUTANEOUS at 11:33

## 2021-07-01 RX ADMIN — Medication 4: at 11:34

## 2021-07-01 RX ADMIN — CHLORHEXIDINE GLUCONATE 1 APPLICATION(S): 213 SOLUTION TOPICAL at 11:35

## 2021-07-01 RX ADMIN — Medication 325 MILLIGRAM(S): at 11:35

## 2021-07-01 RX ADMIN — Medication 3 MILLILITER(S): at 21:39

## 2021-07-01 RX ADMIN — PANTOPRAZOLE SODIUM 40 MILLIGRAM(S): 20 TABLET, DELAYED RELEASE ORAL at 06:38

## 2021-07-01 NOTE — PROGRESS NOTE ADULT - SUBJECTIVE AND OBJECTIVE BOX
Follow Up:  COVID19    Interval History: Patient started on standing avycaz and inhaled tobramycin. Prelim gram stain from sputum culture resulted.     REVIEW OF SYSTEMS    [ x ] ROS unobtainable because:  In accordance with [facility] policy / recommendations (at this time) patient has been chart checked and discussed with the team however, in an effort to preserve PPE in person encounter not performed.    [  ] All other systems negative except as noted below:     Constitutional:  [ ] fever [ ] chills  [ ] weight loss  [ ] weakness  Skin:  [ ] rash [ ] phlebitis	  Eyes: [ ] icterus [ ] pain  [ ] discharge	  ENMT: [ ] sore throat  [ ] thrush [ ] ulcers [ ] exudates  Respiratory: [  ] dyspnea [ ] hemoptysis [ ] cough [ ] sputum	  Cardiovascular:  [ ] chest pain [ ] palpitations [ ] edema	  Gastrointestinal:  [ ] nausea [ ] vomiting [ ] diarrhea [ ] constipation [ ] pain	  Genitourinary:  [ ] dysuria [ ] frequency [ ] hematuria [ ] discharge [ ] flank pain  [ ] incontinence  Musculoskeletal:  [ ] myalgias [ ] arthralgias [ ] arthritis  [ ] back pain  Neurological:  [ ] headache [ ] seizures  [ ] confusion/altered mental status    Allergies  No Known Allergies        ANTIMICROBIALS:  ceftazidime/avibactam IVPB 2.5 every 8 hours  tobramycin for Nebulization 300 every 12 hours      OTHER MEDS:  MEDICATIONS  (STANDING):  acetaminophen   Tablet .. 650 every 4 hours PRN  ALBUTerol    0.083% 2.5 every 6 hours PRN  albuterol/ipratropium for Nebulization 3 every 6 hours  amLODIPine   Tablet 5 daily  budesonide 160 MICROgram(s)/formoterol 4.5 MICROgram(s) Inhaler 2 two times a day  dextrose 40% Gel 15 once  dextrose 50% Injectable 25 once  dextrose 50% Injectable 12.5 once  dextrose 50% Injectable 25 once  enoxaparin Injectable 40 daily  famotidine    Tablet 40 at bedtime  glucagon  Injectable 1 once  guaiFENesin Oral Liquid (Sugar-Free) 100 every 6 hours PRN  insulin glargine Injectable (LANTUS) 10 at bedtime  insulin lispro (ADMELOG) corrective regimen sliding scale  three times a day before meals  insulin lispro (ADMELOG) corrective regimen sliding scale  at bedtime  mirtazapine 7.5 at bedtime  montelukast 10 daily  pantoprazole    Tablet 40 before breakfast  predniSONE   Tablet 40 daily  sodium chloride 3%  Inhalation 4 four times a day      Vital Signs Last 24 Hrs  T(C): 36.4 (30 Jun 2021 21:15), Max: 36.5 (30 Jun 2021 13:14)  T(F): 97.5 (30 Jun 2021 21:15), Max: 97.7 (30 Jun 2021 13:14)  HR: 92 (01 Jul 2021 07:03) (84 - 101)  BP: 148/81 (30 Jun 2021 21:15) (116/62 - 148/81)  BP(mean): --  RR: 21 (30 Jun 2021 21:15) (20 - 21)  SpO2: 95% (01 Jul 2021 07:03) (92% - 100%)    LABORATORY:                          12.2   9.85  )-----------( 183      ( 01 Jul 2021 07:32 )             39.8       07-01    140  |  95<L>  |  11  ----------------------------<  123<H>  3.5   |  40<H>  |  0.38<L>    Ca    9.1      01 Jul 2021 07:32  Phos  2.6     07-01  Mg     2.00     07-01    TPro  7.4  /  Alb  3.8  /  TBili  0.3  /  DBili  x   /  AST  42<H>  /  ALT  34<H>  /  AlkPhos  169<H>  06-29                      Procalcitonin, Serum: 0.07 ng/mL (06-29-21 @ 15:23)      MICROBIOLOGY:  v  .Sputum Sputum  06-30-21 --  --    Numerous polymorphonuclear leukocytes per low power field  Few Squamous epithelial cells per low power field  Moderate Gram Negative Rods per oil power field  Few Gram positive cocci in pairs per oil power field  Few Yeast like cells per oil power field          Rapid RVP Result: NotDetec (06-29 @ 15:50)        RADIOLOGY:    <The imaging below has been reviewed and visualized by me independently. Findings as detailed in report below> Follow Up:  Patient started on standing avycaz and inhaled tobramycin. Gram stain resulted from sputum culture.     Interval History/ROS:    Allergies  No Known Allergies        ANTIMICROBIALS:  ceftazidime/avibactam IVPB 2.5 every 8 hours  tobramycin for Nebulization 300 every 12 hours      OTHER MEDS:  MEDICATIONS  (STANDING):  acetaminophen   Tablet .. 650 every 4 hours PRN  ALBUTerol    0.083% 2.5 every 6 hours PRN  albuterol/ipratropium for Nebulization 3 every 6 hours  amLODIPine   Tablet 5 daily  budesonide 160 MICROgram(s)/formoterol 4.5 MICROgram(s) Inhaler 2 two times a day  dextrose 40% Gel 15 once  dextrose 50% Injectable 25 once  dextrose 50% Injectable 12.5 once  dextrose 50% Injectable 25 once  enoxaparin Injectable 40 daily  famotidine    Tablet 40 at bedtime  glucagon  Injectable 1 once  guaiFENesin Oral Liquid (Sugar-Free) 100 every 6 hours PRN  insulin glargine Injectable (LANTUS) 10 at bedtime  insulin lispro (ADMELOG) corrective regimen sliding scale  three times a day before meals  insulin lispro (ADMELOG) corrective regimen sliding scale  at bedtime  mirtazapine 7.5 at bedtime  montelukast 10 daily  pantoprazole    Tablet 40 before breakfast  predniSONE   Tablet 40 daily  sodium chloride 3%  Inhalation 4 four times a day      Vital Signs Last 24 Hrs  T(C): 36.4 (30 Jun 2021 21:15), Max: 36.5 (30 Jun 2021 13:14)  T(F): 97.5 (30 Jun 2021 21:15), Max: 97.7 (30 Jun 2021 13:14)  HR: 92 (01 Jul 2021 07:03) (84 - 101)  BP: 148/81 (30 Jun 2021 21:15) (116/62 - 148/81)  BP(mean): --  RR: 21 (30 Jun 2021 21:15) (20 - 21)  SpO2: 95% (01 Jul 2021 07:03) (92% - 100%)    PHYSICAL EXAM:  GA: NAD  HEENT: oral cavity no lesion  CV: nl S1/S2, no RMG  Lungs: diffuse rhonchi, patient's cough exacerbated by deep inhalaiton  Abd: BS+, soft, nontender, no rebounding pain  Ext: no edema  Skin:  IV: no phlebitis                            12.2   9.85  )-----------( 183      ( 01 Jul 2021 07:32 )             39.8       07-01    140  |  95<L>  |  11  ----------------------------<  123<H>  3.5   |  40<H>  |  0.38<L>    Ca    9.1      01 Jul 2021 07:32  Phos  2.6     07-01  Mg     2.00     07-01    TPro  7.4  /  Alb  3.8  /  TBili  0.3  /  DBili  x   /  AST  42<H>  /  ALT  34<H>  /  AlkPhos  169<H>  06-29          MICROBIOLOGY:  v    Culture - Sputum (collected 30 Jun 2021 12:59)  Source: .Sputum Sputum  Gram Stain (30 Jun 2021 17:58):    Numerous polymorphonuclear leukocytes per low power field    Few Squamous epithelial cells per low power field    Moderate Gram Negative Rods per oil power field    Few Gram positive cocci in pairs per oil power field    Few Yeast like cells per oil power field              Rapid RVP Result: NotDetec (06-29 @ 15:50)        RADIOLOGY: Follow Up:  Patient started on standing avycaz and inhaled tobramycin. Gram stain resulted from sputum culture.     Interval History/ROS: Patient seen at bedside undergoing airway clearance with RT. Continued sputum production in cup at bedside.     Allergies  No Known Allergies        ANTIMICROBIALS:  ceftazidime/avibactam IVPB 2.5 every 8 hours  tobramycin for Nebulization 300 every 12 hours      OTHER MEDS:  MEDICATIONS  (STANDING):  acetaminophen   Tablet .. 650 every 4 hours PRN  ALBUTerol    0.083% 2.5 every 6 hours PRN  albuterol/ipratropium for Nebulization 3 every 6 hours  amLODIPine   Tablet 5 daily  budesonide 160 MICROgram(s)/formoterol 4.5 MICROgram(s) Inhaler 2 two times a day  dextrose 40% Gel 15 once  dextrose 50% Injectable 25 once  dextrose 50% Injectable 12.5 once  dextrose 50% Injectable 25 once  enoxaparin Injectable 40 daily  famotidine    Tablet 40 at bedtime  glucagon  Injectable 1 once  guaiFENesin Oral Liquid (Sugar-Free) 100 every 6 hours PRN  insulin glargine Injectable (LANTUS) 10 at bedtime  insulin lispro (ADMELOG) corrective regimen sliding scale  three times a day before meals  insulin lispro (ADMELOG) corrective regimen sliding scale  at bedtime  mirtazapine 7.5 at bedtime  montelukast 10 daily  pantoprazole    Tablet 40 before breakfast  predniSONE   Tablet 40 daily  sodium chloride 3%  Inhalation 4 four times a day      Vital Signs Last 24 Hrs  T(C): 36.4 (30 Jun 2021 21:15), Max: 36.5 (30 Jun 2021 13:14)  T(F): 97.5 (30 Jun 2021 21:15), Max: 97.7 (30 Jun 2021 13:14)  HR: 92 (01 Jul 2021 07:03) (84 - 101)  BP: 148/81 (30 Jun 2021 21:15) (116/62 - 148/81)  BP(mean): --  RR: 21 (30 Jun 2021 21:15) (20 - 21)  SpO2: 95% (01 Jul 2021 07:03) (92% - 100%)    PHYSICAL EXAM:  GA: NAD  HEENT: oral cavity no lesion  CV: nl S1/S2, no RMG  Lungs: diffuse rhonchi, patient's cough exacerbated by deep inhalaiton  Abd: BS+, soft, nontender, no rebounding pain  Ext: no edema  Skin:  IV: no phlebitis                            12.2   9.85  )-----------( 183      ( 01 Jul 2021 07:32 )             39.8       07-01    140  |  95<L>  |  11  ----------------------------<  123<H>  3.5   |  40<H>  |  0.38<L>    Ca    9.1      01 Jul 2021 07:32  Phos  2.6     07-01  Mg     2.00     07-01    TPro  7.4  /  Alb  3.8  /  TBili  0.3  /  DBili  x   /  AST  42<H>  /  ALT  34<H>  /  AlkPhos  169<H>  06-29          MICROBIOLOGY:  v    Culture - Sputum (collected 30 Jun 2021 12:59)  Source: .Sputum Sputum  Gram Stain (30 Jun 2021 17:58):    Numerous polymorphonuclear leukocytes per low power field    Few Squamous epithelial cells per low power field    Moderate Gram Negative Rods per oil power field    Few Gram positive cocci in pairs per oil power field    Few Yeast like cells per oil power field              Rapid RVP Result: NotDetec (06-29 @ 15:50)        RADIOLOGY:

## 2021-07-01 NOTE — PROGRESS NOTE ADULT - ASSESSMENT
66 y.o. 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       #Pseudomonas Pneumonia  -recent hospital admission for pseudomonas pneumonia, risk factors of bronchiectasis, structural lung abnormality (primary ciliary dyskinesia), CXR this admission shows chronic bronchiectasis/bronchial wall thickening, prior left upper lobe consolidation resolved, however given borderline sensitivities Ceftazidime/Avibactam and aminoglycosides, concern for evolving resistance, patient likely colonized  -f/u sputum culture 6/30, prelim gram stain reveals mod gram neg rods, few gram positive coci in pairs, few yeast like cells  -c/w avycaz at 0.25mg/kg q8 hours (we will order as antibiotic non-formulary) for at least 7 days  -c/w inhaled tobramycin 300mg q12 hours  - awaiting sensitivities, will tailor as needed  - c/w isolation precautions   66 y.o. 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       #Pseudomonas Pneumonia  -recent hospital admission for pseudomonas pneumonia, risk factors of bronchiectasis, structural lung abnormality (primary ciliary dyskinesia), CXR this admission shows chronic bronchiectasis/bronchial wall thickening, prior left upper lobe consolidation resolved, however given borderline sensitivities Ceftazidime/Avibactam and aminoglycosides, concern for evolving resistance, patient likely colonized  -f/u sputum culture 6/30, prelim gram stain reveals mod gram neg rods, few gram positive coci in pairs, few yeast like cells  -c/w avycaz at 0.25mg/kg q8 hours (we will order as antibiotic non-formulary) for at least 7 days  -c/w inhaled tobramycin 300mg q12 hours  - awaiting sensitivities, will tailor as needed  - c/w isolation precautions    NOTE NOT FINAL     66 y.o. 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       #Pseudomonas Pneumonia  -recent hospital admission for pseudomonas pneumonia, risk factors of bronchiectasis, structural lung abnormality (primary ciliary dyskinesia), CXR this admission shows chronic bronchiectasis/bronchial wall thickening, prior left upper lobe consolidation resolved, however given borderline sensitivities Ceftazidime/Avibactam and aminoglycosides, concern for evolving resistance, patient likely colonized  -f/u sputum culture 6/30, prelim gram stain reveals mod gram neg rods, few gram positive coci in pairs, few yeast like cells  -c/w avycaz at 0.25mg/kg q8 hours (we will order as antibiotic non-formulary) for at least 7 days  -c/w inhaled tobramycin 300mg q12 hours  -awaiting sensitivities, will tailor as needed  -c/w isolation precautions    AWAIT ATTENDING ATTESTATION  Kim Mccormick, PGY-2

## 2021-07-01 NOTE — PROGRESS NOTE ADULT - ATTENDING COMMENTS
65 yo woman with h/o diffuse cystic bronchiectasis, primary ciliary dyskinesia, diabetes, who was hospitalized 4/2021 for  Pseudomonas pneumonia and treated with ceftaz/ avibactam now sent from outpatient pulmonologist' s office 6/30 for admission due to patient developing worsening SOB and cough. Daughter  report patient has lost 10 lbs since last admission.  Sputum culture 6/30 testing- gnr  CXR shows bilateral interstitial opacities c/w chronic bronchiectasis; otherwise clear lungs.  Concern for recurrent  Pseudomonas pneumonia.  Would c/w empiric ceftaz/ avibactam (ordered) and inhaled Tobra.  Follow cultures - will adjust antibiotic regimen accordingly.  Contact precautions for now.    Cheli Mendoza MD  Pager: [phone]  After 5 PM or weekends please call fellow on call or office [phone]

## 2021-07-01 NOTE — PROGRESS NOTE ADULT - SUBJECTIVE AND OBJECTIVE BOX
CHIEF COMPLAINT: Patient is a 66y old  Female who presents with a chief complaint of SOB (30 Jun 2021 09:22)      Interval Events: Pt seen at bedside     REVIEW OF SYSTEMS:  Constitutional:   Eyes:  ENT:  CV:  Resp:  GI:  :  MSK:  Integumentary:  Neurological:  Psychiatric:  Endocrine:  Hematologic/Lymphatic:  Allergic/Immunologic:  [ ] All other systems negative      OBJECTIVE:  ICU Vital Signs Last 24 Hrs  T(C): 36.4 (30 Jun 2021 21:15), Max: 36.5 (30 Jun 2021 13:14)  T(F): 97.5 (30 Jun 2021 21:15), Max: 97.7 (30 Jun 2021 13:14)  HR: 84 (01 Jul 2021 03:27) (84 - 101)  BP: 148/81 (30 Jun 2021 21:15) (116/62 - 148/81)  BP(mean): --  ABP: --  ABP(mean): --  RR: 21 (30 Jun 2021 21:15) (20 - 21)  SpO2: 95% (01 Jul 2021 03:27) (92% - 100%)    Mode: NIV (Noninvasive Ventilation), RR (machine): 18, TV (machine): 380, FiO2: 28, PEEP: 5, ITime: 1, PIP: 22    06-30 @ 07:01  -  07-01 @ 06:57  --------------------------------------------------------  IN: 200 mL / OUT: 0 mL / NET: 200 mL      CAPILLARY BLOOD GLUCOSE      POCT Blood Glucose.: 249 mg/dL (30 Jun 2021 21:22)        HOSPITAL MEDICATIONS:  MEDICATIONS  (STANDING):  albuterol/ipratropium for Nebulization 3 milliLiter(s) Nebulizer every 6 hours  amLODIPine   Tablet 5 milliGRAM(s) Oral daily  ascorbic acid 500 milliGRAM(s) Oral daily  budesonide 160 MICROgram(s)/formoterol 4.5 MICROgram(s) Inhaler 2 Puff(s) Inhalation two times a day  ceftazidime/avibactam IVPB 2.5 Gram(s) IV Intermittent every 8 hours  chlorhexidine 4% Liquid 1 Application(s) Topical daily  dextrose 40% Gel 15 Gram(s) Oral once  dextrose 5%. 1000 milliLiter(s) (50 mL/Hr) IV Continuous <Continuous>  dextrose 5%. 1000 milliLiter(s) (100 mL/Hr) IV Continuous <Continuous>  dextrose 50% Injectable 25 Gram(s) IV Push once  dextrose 50% Injectable 12.5 Gram(s) IV Push once  dextrose 50% Injectable 25 Gram(s) IV Push once  enoxaparin Injectable 40 milliGRAM(s) SubCutaneous daily  famotidine    Tablet 40 milliGRAM(s) Oral at bedtime  ferrous    sulfate 325 milliGRAM(s) Oral daily  glucagon  Injectable 1 milliGRAM(s) IntraMuscular once  insulin glargine Injectable (LANTUS) 10 Unit(s) SubCutaneous at bedtime  insulin lispro (ADMELOG) corrective regimen sliding scale   SubCutaneous three times a day before meals  insulin lispro (ADMELOG) corrective regimen sliding scale   SubCutaneous at bedtime  mirtazapine 7.5 milliGRAM(s) Oral at bedtime  montelukast 10 milliGRAM(s) Oral daily  pantoprazole    Tablet 40 milliGRAM(s) Oral before breakfast  predniSONE   Tablet 40 milliGRAM(s) Oral daily  sodium chloride 0.65% Nasal 1 Spray(s) Both Nostrils two times a day  sodium chloride 3%  Inhalation 4 milliLiter(s) Inhalation four times a day  tobramycin for Nebulization 300 milliGRAM(s) Inhalation every 12 hours    MEDICATIONS  (PRN):  acetaminophen   Tablet .. 650 milliGRAM(s) Oral every 4 hours PRN Mild Pain (1 - 3)  ALBUTerol    0.083% 2.5 milliGRAM(s) Nebulizer every 6 hours PRN Shortness of Breath and/or Wheezing  guaiFENesin Oral Liquid (Sugar-Free) 100 milliGRAM(s) Oral every 6 hours PRN Cough      LABS:                        12.1   9.06  )-----------( 180      ( 30 Jun 2021 08:17 )             40.2     06-30    137  |  93<L>  |  12  ----------------------------<  180<H>  4.4   |  37<H>  |  0.33<L>    Ca    9.6      30 Jun 2021 08:17  Phos  3.9     06-30  Mg     2.00     06-30    TPro  7.4  /  Alb  3.8  /  TBili  0.3  /  DBili  x   /  AST  42<H>  /  ALT  34<H>  /  AlkPhos  169<H>  06-29        Arterial Blood Gas:  06-30 @ 10:31  7.39/76/53/41/87.5/19.2  ABG lactate: --  Arterial Blood Gas:  06-30 @ 05:16  7.29/94/175/38/99.2/16.8  ABG lactate: --  Arterial Blood Gas:  06-30 @ 00:15  7.37/76/134/38/99.1/16.3  ABG lactate: --  Arterial Blood Gas:  06-29 @ 21:08  7.28/92/109/36/98.3/14.9  ABG lactate: --    Venous Blood Gas:  06-29 @ 18:01  7.17/>110/156/34/98.6  VBG Lactate: 0.9      MICROBIOLOGY:     RADIOLOGY:  [ ] Reviewed and interpreted by me    PULMONARY FUNCTION TESTS:    EKG: CHIEF COMPLAINT: Patient is a 66y old  Female who presents with a chief complaint of SOB (30 Jun 2021 09:22)      Interval Events: Pt seen at bedside     REVIEW OF SYSTEMS:  Constitutional: Denies pain/chills   ENT: sinus pain   CV: Denies   Resp:  Difficulty expectorating sputum   GI: Denies   Neurological: intermittent intentional tremors   [ x] All other systems negative      OBJECTIVE:  ICU Vital Signs Last 24 Hrs  T(C): 36.4 (30 Jun 2021 21:15), Max: 36.5 (30 Jun 2021 13:14)  T(F): 97.5 (30 Jun 2021 21:15), Max: 97.7 (30 Jun 2021 13:14)  HR: 84 (01 Jul 2021 03:27) (84 - 101)  BP: 148/81 (30 Jun 2021 21:15) (116/62 - 148/81)  BP(mean): --  ABP: --  ABP(mean): --  RR: 21 (30 Jun 2021 21:15) (20 - 21)  SpO2: 95% (01 Jul 2021 03:27) (92% - 100%)    Mode: NIV (Noninvasive Ventilation), RR (machine): 18, TV (machine): 380, FiO2: 28, PEEP: 5, ITime: 1, PIP: 22    06-30 @ 07:01  -  07-01 @ 06:57  --------------------------------------------------------  IN: 200 mL / OUT: 0 mL / NET: 200 mL      CAPILLARY BLOOD GLUCOSE      POCT Blood Glucose.: 249 mg/dL (30 Jun 2021 21:22)        HOSPITAL MEDICATIONS:  MEDICATIONS  (STANDING):  albuterol/ipratropium for Nebulization 3 milliLiter(s) Nebulizer every 6 hours  amLODIPine   Tablet 5 milliGRAM(s) Oral daily  ascorbic acid 500 milliGRAM(s) Oral daily  budesonide 160 MICROgram(s)/formoterol 4.5 MICROgram(s) Inhaler 2 Puff(s) Inhalation two times a day  ceftazidime/avibactam IVPB 2.5 Gram(s) IV Intermittent every 8 hours  chlorhexidine 4% Liquid 1 Application(s) Topical daily  dextrose 40% Gel 15 Gram(s) Oral once  dextrose 5%. 1000 milliLiter(s) (50 mL/Hr) IV Continuous <Continuous>  dextrose 5%. 1000 milliLiter(s) (100 mL/Hr) IV Continuous <Continuous>  dextrose 50% Injectable 25 Gram(s) IV Push once  dextrose 50% Injectable 12.5 Gram(s) IV Push once  dextrose 50% Injectable 25 Gram(s) IV Push once  enoxaparin Injectable 40 milliGRAM(s) SubCutaneous daily  famotidine    Tablet 40 milliGRAM(s) Oral at bedtime  ferrous    sulfate 325 milliGRAM(s) Oral daily  glucagon  Injectable 1 milliGRAM(s) IntraMuscular once  insulin glargine Injectable (LANTUS) 10 Unit(s) SubCutaneous at bedtime  insulin lispro (ADMELOG) corrective regimen sliding scale   SubCutaneous three times a day before meals  insulin lispro (ADMELOG) corrective regimen sliding scale   SubCutaneous at bedtime  mirtazapine 7.5 milliGRAM(s) Oral at bedtime  montelukast 10 milliGRAM(s) Oral daily  pantoprazole    Tablet 40 milliGRAM(s) Oral before breakfast  predniSONE   Tablet 40 milliGRAM(s) Oral daily  sodium chloride 0.65% Nasal 1 Spray(s) Both Nostrils two times a day  sodium chloride 3%  Inhalation 4 milliLiter(s) Inhalation four times a day  tobramycin for Nebulization 300 milliGRAM(s) Inhalation every 12 hours    MEDICATIONS  (PRN):  acetaminophen   Tablet .. 650 milliGRAM(s) Oral every 4 hours PRN Mild Pain (1 - 3)  ALBUTerol    0.083% 2.5 milliGRAM(s) Nebulizer every 6 hours PRN Shortness of Breath and/or Wheezing  guaiFENesin Oral Liquid (Sugar-Free) 100 milliGRAM(s) Oral every 6 hours PRN Cough      LABS:                        12.1   9.06  )-----------( 180      ( 30 Jun 2021 08:17 )             40.2     06-30    137  |  93<L>  |  12  ----------------------------<  180<H>  4.4   |  37<H>  |  0.33<L>    Ca    9.6      30 Jun 2021 08:17  Phos  3.9     06-30  Mg     2.00     06-30    TPro  7.4  /  Alb  3.8  /  TBili  0.3  /  DBili  x   /  AST  42<H>  /  ALT  34<H>  /  AlkPhos  169<H>  06-29        Arterial Blood Gas:  06-30 @ 10:31  7.39/76/53/41/87.5/19.2  ABG lactate: --  Arterial Blood Gas:  06-30 @ 05:16  7.29/94/175/38/99.2/16.8  ABG lactate: --  Arterial Blood Gas:  06-30 @ 00:15  7.37/76/134/38/99.1/16.3  ABG lactate: --  Arterial Blood Gas:  06-29 @ 21:08  7.28/92/109/36/98.3/14.9  ABG lactate: --    Venous Blood Gas:  06-29 @ 18:01  7.17/>110/156/34/98.6  VBG Lactate: 0.9      MICROBIOLOGY:     RADIOLOGY:  [ ] Reviewed and interpreted by me    PULMONARY FUNCTION TESTS:    EKG:

## 2021-07-01 NOTE — PROGRESS NOTE ADULT - ATTENDING COMMENTS
Agree with plan as outlined above. Patient seen and examined at bedside. Patient history, laboratory data, and imaging personally reviewed.     Pt is a 66F with PMHx HCV nodular cirrhosis, HTN, IDDMII, osteoporosis, hx ABPA (s/p Itraconazole in 2015), and diffuse cystic bronchiectasis possibly 2/2 primary ciliary dyskinesia (sinusitis, bronchiectasis, and recurrent infections) c/b chronic Stenotrophomonas and Pseudomonas colonization with severe obstructive lung dz (PFTs 11/2017 FEV1/FVC: 39%/43% (69%) TLC 84%, %, DLCO 66%) c/b acute ILD exacerbation with acute hypercapnic respiratory failure despite O/P mgmt admitted to [facility] for further management.     Pt initially p/w acute hypercapnic respiratory failure requiring NIV Bilevel support with failure of improvement, transitioned to AVAPS with significant clinical improvement and improved ABG. Now tolerating NC QD with AVAPS qhs and prn. Pt also with persitent thick and sticky mucous with difficulty expectorating. Airway clearance therapy adjusted, will try DuoNebs followed by MucoMyst followed by vest therapy (pt unable to hold MetaNebs to mouth for entire session) q4-6hrs. Will also c/w Aerobika and incentive spirometry at bedside for independent use. Home inhaler regimen: Albuterol+ Hypertonic saline + Aerobika TID as well as Symbicort BID+ Spiriva qhs with nebulized tobramycin every other month (given through 6/29). Chest CT and sinuses performed overnight, will f/u results. Prednisone 40mg QD for ILD exacerbation.     Repeat sputum cx pending from this admission. O/P sputum cx from 6/29 in [employer] (+) GNR, but not yet speciated. Prior sputum cx from 4/4/21 shows carbapenem resistant Pseudomonas (sensitive to tobramycin and amikacin). Pt with multiple prior sputum cx (+) for Pseudomonas, but more sensitive. Pt also with (+) Stenotrophomonas (R: Levofloxacin and Ceftazidime; S: Bactrim) on 7/31/18. AFB (-) on multiple occasions. Pt initiated on Avycaz IV + nebulized tobramycin on 6/30. Will discuss need for chronic management with alternating 2nd nebulized Abx with tobramycin as well.     Nutrition consulted. Glucerna added to soft diet. Swallow evaluation with MBS wnl on prior admission. Hepatology consult placed for hx cirrhosis and possibility of ultimate lung transplant referral, will f/u recs. DMII management with ISS and BGFS. DVT ppx with Lovenox. GI ppx in place.     Dispo pending medical optimization. Pt full code. Discussed plan with pt and daughter at bedside. PT consulted.

## 2021-07-01 NOTE — CONSULT NOTE ADULT - ASSESSMENT
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 CLD work up previously: HBsAg neg, HbcIgM neg, IgG, IgA, IgM wnl, RUPESH, anti-LKM, AMA negative, Hepatitis A neg, ferritin 169, AFP wnl, ASMA 1:40. MR/MRCP without stricturing or hepatic lesions. No ascites on all imaging and no hx of HE.  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 CLD work up previously: HBsAg neg, HbcIgM neg, c Total neg, sAb neg, IgG, IgA, IgM wnl, RUPESH, anti-LKM, AMA negative, Hepatitis A neg, ferritin 169, AFP wnl, ASMA 1:40. MR/MRCP without stricturing or hepatic lesions. No ascites on all imaging and no hx of HE.  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 CLD work up previously: HBsAg neg, HbcIgM neg, c Total neg, sAb neg, IgG, IgA, IgM wnl, RUPESH, anti-LKM, AMA negative, Hepatitis A neg, ferritin 169, AFP wnl, ASMA 1:40. MR/MRCP without stricturing or hepatic lesions. No ascites on all imaging and no hx of HE.  - ascites: none  - HE: none  - Varices: unknown, never had EGD  - no other signs of portal HTN; no splenomegaly, normal platelets, no ascites  - Child Maria Class A  - MELD Na 9    Recommendation:  - given need for evaluation prior to lung transplant eval, will need definitive diagnosis of cirrhosis  - recommend liver biopsy  - pending results of biopsy will be able to further evaluate and manage cirrhosis if present  - rest of care per primary team    Meche Castillo PGY-5  Gastroenterology Fellow  Pager #[phone] ([facility]) or [phone] (NS)  Available on [employer] Teams.  Please contact on-call GI fellow via answering service ([phone]) after 5pm and before 8am, and on weekends.

## 2021-07-01 NOTE — CONSULT NOTE ADULT - SUBJECTIVE AND OBJECTIVE BOX
Chief Complaint:  Patient is a 66y old  Female who presents with a chief complaint of SOB (30 Jun 2021 09:22)      HPI: 66 y.o. 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 Has been having 10 days of worsening cough with yellow sputum and increasing shortness of breath. Admitted to [facility], on bipap.    During admission in April patient was found to have nodular contour of liver on US. Patient was noted to have history of blood transfusion in 1998 with +hep C Ab with neg Hep C RNA. 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 CLD work up previously: HBsAg neg, HbcIgM neg, IgG, IgA, IgM wnl, RUPESH, anti-LKM, AMA negative, Hepatitis A neg, ferritin 169, AFP wnl, ASMA 1:40. MR/MRCP without stricturing or hepatic lesions. No ascites on all imaging and no hx of HE.          Allergies:  No Known Allergies      Home Medications:    Hospital Medications:  acetaminophen   Tablet .. 650 milliGRAM(s) Oral every 4 hours PRN  ALBUTerol    0.083% 2.5 milliGRAM(s) Nebulizer every 6 hours PRN  albuterol/ipratropium for Nebulization 3 milliLiter(s) Nebulizer every 6 hours  amLODIPine   Tablet 5 milliGRAM(s) Oral daily  ascorbic acid 500 milliGRAM(s) Oral daily  budesonide 160 MICROgram(s)/formoterol 4.5 MICROgram(s) Inhaler 2 Puff(s) Inhalation two times a day  ceftazidime/avibactam IVPB 2.5 Gram(s) IV Intermittent every 8 hours  chlorhexidine 4% Liquid 1 Application(s) Topical daily  dextrose 40% Gel 15 Gram(s) Oral once  dextrose 5%. 1000 milliLiter(s) IV Continuous <Continuous>  dextrose 5%. 1000 milliLiter(s) IV Continuous <Continuous>  dextrose 50% Injectable 25 Gram(s) IV Push once  dextrose 50% Injectable 12.5 Gram(s) IV Push once  dextrose 50% Injectable 25 Gram(s) IV Push once  enoxaparin Injectable 40 milliGRAM(s) SubCutaneous daily  famotidine    Tablet 40 milliGRAM(s) Oral at bedtime  ferrous    sulfate 325 milliGRAM(s) Oral daily  glucagon  Injectable 1 milliGRAM(s) IntraMuscular once  guaiFENesin Oral Liquid (Sugar-Free) 100 milliGRAM(s) Oral every 6 hours PRN  insulin glargine Injectable (LANTUS) 10 Unit(s) SubCutaneous at bedtime  insulin lispro (ADMELOG) corrective regimen sliding scale   SubCutaneous three times a day before meals  insulin lispro (ADMELOG) corrective regimen sliding scale   SubCutaneous at bedtime  mirtazapine 7.5 milliGRAM(s) Oral at bedtime  montelukast 10 milliGRAM(s) Oral daily  pantoprazole    Tablet 40 milliGRAM(s) Oral before breakfast  predniSONE   Tablet 40 milliGRAM(s) Oral daily  sodium chloride 3%  Inhalation 4 milliLiter(s) Inhalation two times a day      PMHX/PSHX:  DM (diabetes mellitus)    Bronchitis    ABPA (allergic bronchopulmonary aspergillosis)    Bronchiectasis    Asthma    Hypertension    Vitamin D deficiency    Microcytic anemia    GERD (gastroesophageal reflux disease)    Postnasal drip    Pseudomonas aeruginosa colonization    Allergic rhinitis    Recurrent pneumonia    Type 2 diabetes mellitus, with long-term current use of insulin    History of cholecystectomy        Family history:  No pertinent family history in first degree relatives    Family history of diabetes mellitus    Family history of allergic rhinitis (Child, Child)    Family history of bronchitis        Social History:    ROS:    General:  No weight loss, fevers, chills, night sweats, fatigue  Eyes:  No vision changes, no yellowing of eyes  ENT:  No throat pain, runny nose  CV:  No chest pain, palpitations  Resp:  No SOB, cough, wheezing  GI:  See HPI  :  No burning with urination, no hematuria  Muscle:  No muscle pain, weakness  Neuro:  No numbness/tingling, memory problems  Psych:  No fatigue, insomnia, mood problems  Heme:  No easy bruisability  Skin:  No rash, itching      PHYSICAL EXAM:    GENERAL:  Appears stated age, well-groomed, well-nourished, no distress  HEENT:  NC/AT,  conjunctivae clear and pink,  no JVD  CHEST:  Full & symmetric excursion, no increased effort, breath sounds clear  HEART:  Regular rhythm, S1, S2, no murmur/rub/S3/S4, no abdominal bruit, no edema  ABDOMEN:  Soft, non-tender, non-distended, normoactive bowel sounds,  no masses ,  EXTREMITIES:  no cyanosis,clubbing or edema  SKIN:  No rash/erythema/ecchymoses/petechiae/wounds/abscess/warm/dry  NEURO:  Alert, oriented    Vital Signs:  Vital Signs Last 24 Hrs  T(C): 36.5 (30 Jun 2021 13:14), Max: 36.9 (29 Jun 2021 16:18)  T(F): 97.7 (30 Jun 2021 13:14), Max: 98.5 (29 Jun 2021 17:37)  HR: 93 (30 Jun 2021 13:14) (74 - 93)  BP: 116/62 (30 Jun 2021 13:14) (116/62 - 147/71)  BP(mean): --  RR: 20 (30 Jun 2021 13:14) (20 - 25)  SpO2: 92% (30 Jun 2021 13:14) (92% - 100%)  Daily    Daily    LABS:                        12.1  9.06  )-----------( 180      ( 30 Jun 2021 08:17 )             40.2    06-30    137  |  93<L>  |  12  ----------------------------<  180<H>  4.4   |  37<H>  |  0.33<L>    Ca    9.6      30 Jun 2021 08:17  Phos  3.9     06-30  Mg     2.00     06-30    TPro  7.4  /  Alb  3.8  /  TBili  0.3  /  DBili  x   /  AST  42<H>  /  ALT  34<H>  /  AlkPhos  169<H>  06-29    LIVER FUNCTIONS - ( 29 Jun 2021 15:22 )  Alb: 3.8 g/dL / Pro: 7.4 g/dL / ALK PHOS: 169 U/L / ALT: 34 U/L / AST: 42 U/L / GGT: x                  Imaging:  < from: MR MRCP w/wo IV Cont (04.10.21 @ 11:38) >  FINDINGS:    Motion degraded examination.    LOWER CHEST: Within normal limits.    LIVER: Cirrhosis. No evidence of a focal hepatic lesion.  BILE DUCTS: Pneumobilia.  GALLBLADDER: Cholecystectomy.  SPLEEN: Within normal limits.  PANCREAS: Within normal limits.  ADRENALS: Within normal limits.  KIDNEYS/URETERS: Within normal limits.    VISUALIZED PORTIONS:  BOWEL: Within normal limits.  PERITONEUM: No ascites.  VESSELS: Hepatic and portal veins are patent.  RETROPERITONEUM/LYMPH NODES: No lymphadenopathy.  ABDOMINAL WALL: Within normal limits.  BONES:Within normal limits.    IMPRESSION:  Limited, motion degraded examination.    Cirrhosis. No evidence of a focal hepatic lesion.    < end of copied text >     Chief Complaint:  Patient is a 66y old  Female who presents with a chief complaint of SOB (30 Jun 2021 09:22)      HPI: 66 y.o. 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 Has been having 10 days of worsening cough with yellow sputum and increasing shortness of breath. Admitted to [facility], on bipap.    During admission in April patient was found to have nodular contour of liver on US. Patient was noted to have history of blood transfusion in 1998 with +hep C Ab with neg Hep C RNA. 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 CLD work up previously: HBsAg neg, HbcIgM neg, IgG, IgA, IgM wnl, RUPESH, anti-LKM, AMA negative, Hepatitis A neg, ferritin 169, AFP wnl, ASMA 1:40. MR/MRCP without stricturing or hepatic lesions. No ascites on all imaging and no hx of HE.    Upon discussion with patient's son and  today, they state she had hep B back in [place] when she had her GB out.           Allergies:  No Known Allergies      Home Medications:    Hospital Medications:  acetaminophen   Tablet .. 650 milliGRAM(s) Oral every 4 hours PRN  ALBUTerol    0.083% 2.5 milliGRAM(s) Nebulizer every 6 hours PRN  albuterol/ipratropium for Nebulization 3 milliLiter(s) Nebulizer every 6 hours  amLODIPine   Tablet 5 milliGRAM(s) Oral daily  ascorbic acid 500 milliGRAM(s) Oral daily  budesonide 160 MICROgram(s)/formoterol 4.5 MICROgram(s) Inhaler 2 Puff(s) Inhalation two times a day  ceftazidime/avibactam IVPB 2.5 Gram(s) IV Intermittent every 8 hours  chlorhexidine 4% Liquid 1 Application(s) Topical daily  dextrose 40% Gel 15 Gram(s) Oral once  dextrose 5%. 1000 milliLiter(s) IV Continuous <Continuous>  dextrose 5%. 1000 milliLiter(s) IV Continuous <Continuous>  dextrose 50% Injectable 25 Gram(s) IV Push once  dextrose 50% Injectable 12.5 Gram(s) IV Push once  dextrose 50% Injectable 25 Gram(s) IV Push once  enoxaparin Injectable 40 milliGRAM(s) SubCutaneous daily  famotidine    Tablet 40 milliGRAM(s) Oral at bedtime  ferrous    sulfate 325 milliGRAM(s) Oral daily  glucagon  Injectable 1 milliGRAM(s) IntraMuscular once  guaiFENesin Oral Liquid (Sugar-Free) 100 milliGRAM(s) Oral every 6 hours PRN  insulin glargine Injectable (LANTUS) 10 Unit(s) SubCutaneous at bedtime  insulin lispro (ADMELOG) corrective regimen sliding scale   SubCutaneous three times a day before meals  insulin lispro (ADMELOG) corrective regimen sliding scale   SubCutaneous at bedtime  mirtazapine 7.5 milliGRAM(s) Oral at bedtime  montelukast 10 milliGRAM(s) Oral daily  pantoprazole    Tablet 40 milliGRAM(s) Oral before breakfast  predniSONE   Tablet 40 milliGRAM(s) Oral daily  sodium chloride 3%  Inhalation 4 milliLiter(s) Inhalation two times a day      PMHX/PSHX:  DM (diabetes mellitus)    Bronchitis    ABPA (allergic bronchopulmonary aspergillosis)    Bronchiectasis    Asthma    Hypertension    Vitamin D deficiency    Microcytic anemia    GERD (gastroesophageal reflux disease)    Postnasal drip    Pseudomonas aeruginosa colonization    Allergic rhinitis    Recurrent pneumonia    Type 2 diabetes mellitus, with long-term current use of insulin    History of cholecystectomy        Family history:  No pertinent family history in first degree relatives    Family history of diabetes mellitus    Family history of allergic rhinitis (Child, Child)    Family history of bronchitis        Social History:    ROS:    General:  No weight loss, fevers, chills, night sweats, fatigue  Eyes:  No vision changes, no yellowing of eyes  ENT:  No throat pain, runny nose  CV:  No chest pain, palpitations  Resp:  No SOB, cough, wheezing  GI:  See HPI  :  No burning with urination, no hematuria  Muscle:  No muscle pain, weakness  Neuro:  No numbness/tingling, memory problems  Psych:  No fatigue, insomnia, mood problems  Heme:  No easy bruisability  Skin:  No rash, itching      PHYSICAL EXAM:    GENERAL:  thin, no distress  HEENT:  NC/AT,  conjunctivae clear and pink, NC in place  CHEST:  Full & symmetric excursion, no increased effort  HEART:  Regular rhythm, S1, S2, no murmur/rub/S3/S4, no abdominal bruit, no edema  ABDOMEN:  Soft, non-tender, non-distended, normoactive bowel sounds,  no masses ,  EXTREMITIES:  no cyanosis, clubbing or edema  SKIN:  No rash/erythema/jaundice  NEURO:  Alert, oriented    Vital Signs:  Vital Signs Last 24 Hrs  T(C): 36.5 (30 Jun 2021 13:14), Max: 36.9 (29 Jun 2021 16:18)  T(F): 97.7 (30 Jun 2021 13:14), Max: 98.5 (29 Jun 2021 17:37)  HR: 93 (30 Jun 2021 13:14) (74 - 93)  BP: 116/62 (30 Jun 2021 13:14) (116/62 - 147/71)  BP(mean): --  RR: 20 (30 Jun 2021 13:14) (20 - 25)  SpO2: 92% (30 Jun 2021 13:14) (92% - 100%)  Daily    Daily    LABS:                        12.1  9.06  )-----------( 180      ( 30 Jun 2021 08:17 )             40.2    06-30    137  |  93<L>  |  12  ----------------------------<  180<H>  4.4   |  37<H>  |  0.33<L>    Ca    9.6      30 Jun 2021 08:17  Phos  3.9     06-30  Mg     2.00     06-30    TPro  7.4  /  Alb  3.8  /  TBili  0.3  /  DBili  x   /  AST  42<H>  /  ALT  34<H>  /  AlkPhos  169<H>  06-29    LIVER FUNCTIONS - ( 29 Jun 2021 15:22 )  Alb: 3.8 g/dL / Pro: 7.4 g/dL / ALK PHOS: 169 U/L / ALT: 34 U/L / AST: 42 U/L / GGT: x                  Imaging:  < from: MR MRCP w/wo IV Cont (04.10.21 @ 11:38) >  FINDINGS:    Motion degraded examination.    LOWER CHEST: Within normal limits.    LIVER: Cirrhosis. No evidence of a focal hepatic lesion.  BILE DUCTS: Pneumobilia.  GALLBLADDER: Cholecystectomy.  SPLEEN: Within normal limits.  PANCREAS: Within normal limits.  ADRENALS: Within normal limits.  KIDNEYS/URETERS: Within normal limits.    VISUALIZED PORTIONS:  BOWEL: Within normal limits.  PERITONEUM: No ascites.  VESSELS: Hepatic and portal veins are patent.  RETROPERITONEUM/LYMPH NODES: No lymphadenopathy.  ABDOMINAL WALL: Within normal limits.  BONES:Within normal limits.    IMPRESSION:  Limited, motion degraded examination.    Cirrhosis. No evidence of a focal hepatic lesion.    < end of copied text >     Chief Complaint:  Patient is a 66y old  Female who presents with a chief complaint of SOB (30 Jun 2021 09:22)      HPI: 66 y.o. 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 Has been having 10 days of worsening cough with yellow sputum and increasing shortness of breath. Admitted to [facility], on bipap.    During admission in April patient was found to have nodular contour of liver on US. Patient was noted to have history of blood transfusion in 1998 with +hep C Ab with neg Hep C RNA. 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 CLD work up previously: HBsAg neg, HbcIgM neg, IgG, IgA, IgM wnl, RUPESH, anti-LKM, AMA negative, Hepatitis A neg, ferritin 169, AFP wnl, ASMA 1:40. MR/MRCP without stricturing or hepatic lesions. No ascites on all imaging and no hx of HE.    Upon discussion with patient's son and  today, they state she had hep B back in [place] when she had her GB out. However paperwork reviewed - no diagnosis of hepatitis.           Allergies:  No Known Allergies      Home Medications:    Hospital Medications:  acetaminophen   Tablet .. 650 milliGRAM(s) Oral every 4 hours PRN  ALBUTerol    0.083% 2.5 milliGRAM(s) Nebulizer every 6 hours PRN  albuterol/ipratropium for Nebulization 3 milliLiter(s) Nebulizer every 6 hours  amLODIPine   Tablet 5 milliGRAM(s) Oral daily  ascorbic acid 500 milliGRAM(s) Oral daily  budesonide 160 MICROgram(s)/formoterol 4.5 MICROgram(s) Inhaler 2 Puff(s) Inhalation two times a day  ceftazidime/avibactam IVPB 2.5 Gram(s) IV Intermittent every 8 hours  chlorhexidine 4% Liquid 1 Application(s) Topical daily  dextrose 40% Gel 15 Gram(s) Oral once  dextrose 5%. 1000 milliLiter(s) IV Continuous <Continuous>  dextrose 5%. 1000 milliLiter(s) IV Continuous <Continuous>  dextrose 50% Injectable 25 Gram(s) IV Push once  dextrose 50% Injectable 12.5 Gram(s) IV Push once  dextrose 50% Injectable 25 Gram(s) IV Push once  enoxaparin Injectable 40 milliGRAM(s) SubCutaneous daily  famotidine    Tablet 40 milliGRAM(s) Oral at bedtime  ferrous    sulfate 325 milliGRAM(s) Oral daily  glucagon  Injectable 1 milliGRAM(s) IntraMuscular once  guaiFENesin Oral Liquid (Sugar-Free) 100 milliGRAM(s) Oral every 6 hours PRN  insulin glargine Injectable (LANTUS) 10 Unit(s) SubCutaneous at bedtime  insulin lispro (ADMELOG) corrective regimen sliding scale   SubCutaneous three times a day before meals  insulin lispro (ADMELOG) corrective regimen sliding scale   SubCutaneous at bedtime  mirtazapine 7.5 milliGRAM(s) Oral at bedtime  montelukast 10 milliGRAM(s) Oral daily  pantoprazole    Tablet 40 milliGRAM(s) Oral before breakfast  predniSONE   Tablet 40 milliGRAM(s) Oral daily  sodium chloride 3%  Inhalation 4 milliLiter(s) Inhalation two times a day      PMHX/PSHX:  DM (diabetes mellitus)    Bronchitis    ABPA (allergic bronchopulmonary aspergillosis)    Bronchiectasis    Asthma    Hypertension    Vitamin D deficiency    Microcytic anemia    GERD (gastroesophageal reflux disease)    Postnasal drip    Pseudomonas aeruginosa colonization    Allergic rhinitis    Recurrent pneumonia    Type 2 diabetes mellitus, with long-term current use of insulin    History of cholecystectomy        Family history:  No pertinent family history in first degree relatives    Family history of diabetes mellitus    Family history of allergic rhinitis (Child, Child)    Family history of bronchitis        Social History:    ROS:    General:  No weight loss, fevers, chills, night sweats, fatigue  Eyes:  No vision changes, no yellowing of eyes  ENT:  No throat pain, runny nose  CV:  No chest pain, palpitations  Resp:  No SOB, cough, wheezing  GI:  See HPI  :  No burning with urination, no hematuria  Muscle:  No muscle pain, weakness  Neuro:  No numbness/tingling, memory problems  Psych:  No fatigue, insomnia, mood problems  Heme:  No easy bruisability  Skin:  No rash, itching      PHYSICAL EXAM:    GENERAL:  thin, no distress  HEENT:  NC/AT,  conjunctivae clear and pink, NC in place  CHEST:  Full & symmetric excursion, no increased effort  HEART:  Regular rhythm, S1, S2, no murmur/rub/S3/S4, no abdominal bruit, no edema  ABDOMEN:  Soft, non-tender, non-distended, normoactive bowel sounds,  no masses ,  EXTREMITIES:  no cyanosis, clubbing or edema  SKIN:  No rash/erythema/jaundice  NEURO:  Alert, oriented    Vital Signs:  Vital Signs Last 24 Hrs  T(C): 36.5 (30 Jun 2021 13:14), Max: 36.9 (29 Jun 2021 16:18)  T(F): 97.7 (30 Jun 2021 13:14), Max: 98.5 (29 Jun 2021 17:37)  HR: 93 (30 Jun 2021 13:14) (74 - 93)  BP: 116/62 (30 Jun 2021 13:14) (116/62 - 147/71)  BP(mean): --  RR: 20 (30 Jun 2021 13:14) (20 - 25)  SpO2: 92% (30 Jun 2021 13:14) (92% - 100%)  Daily    Daily    LABS:                        12.1  9.06  )-----------( 180      ( 30 Jun 2021 08:17 )             40.2    06-30    137  |  93<L>  |  12  ----------------------------<  180<H>  4.4   |  37<H>  |  0.33<L>    Ca    9.6      30 Jun 2021 08:17  Phos  3.9     06-30  Mg     2.00     06-30    TPro  7.4  /  Alb  3.8  /  TBili  0.3  /  DBili  x   /  AST  42<H>  /  ALT  34<H>  /  AlkPhos  169<H>  06-29    LIVER FUNCTIONS - ( 29 Jun 2021 15:22 )  Alb: 3.8 g/dL / Pro: 7.4 g/dL / ALK PHOS: 169 U/L / ALT: 34 U/L / AST: 42 U/L / GGT: x                  Imaging:  < from: MR MRCP w/wo IV Cont (04.10.21 @ 11:38) >  FINDINGS:    Motion degraded examination.    LOWER CHEST: Within normal limits.    LIVER: Cirrhosis. No evidence of a focal hepatic lesion.  BILE DUCTS: Pneumobilia.  GALLBLADDER: Cholecystectomy.  SPLEEN: Within normal limits.  PANCREAS: Within normal limits.  ADRENALS: Within normal limits.  KIDNEYS/URETERS: Within normal limits.    VISUALIZED PORTIONS:  BOWEL: Within normal limits.  PERITONEUM: No ascites.  VESSELS: Hepatic and portal veins are patent.  RETROPERITONEUM/LYMPH NODES: No lymphadenopathy.  ABDOMINAL WALL: Within normal limits.  BONES:Within normal limits.    IMPRESSION:  Limited, motion degraded examination.    Cirrhosis. No evidence of a focal hepatic lesion.    < end of copied text >

## 2021-07-01 NOTE — CONSULT NOTE ADULT - ATTENDING COMMENTS
66 y.o. 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    bipap, blood gas on bipap  steriods, nebs  pancx, ABx
65 yo woman with h/o diffuse cystic bronchiectasis, primary ciliary dyskinesia, diabetes, who was hospitalized 4/2021 for  Pseudomonas pneumonia and treated with ceftaz/ avibactam now sent from outpatient pulmonologist' s office for admission due to patient developing worsening SOB and cough. Daughter devi report patient has lost 10 lbs since last admission.  Sputum culture 6/30 testing.  CXR shows bilateral interstitial opacities c/w chronic bronchiectasis; otherwise clear lungs.  Concern for recurrent  Pseudomonas pneumonia.  Would start empiric ceftaz/ avibactam (ordered) and inhaled Tobra.  Follow cultures - will adjust antibiotic regimen accordingly.  Contact precautions for now.    Cheli Mendoza MD  Pager: [phone]  After 5 PM or weekends please call fellow on call or office [phone]
Patient seen and examined with the liver team. I agree with the plan as above.  We are asked to assess for the impact of cirrhosis upon eligibility for lung transplantation. She has no risk factors for chronic liver disease but imaging is suggestive of cirrhosis. A previous complete liver workup was non-diagnostic. I have recommended a liver biopsy to be performed by IR to determine if cirrhosis is present. If the patient has cirrhosis, she will need HVPG measurements.    I discussed this at length with the patient's daughter who explained it to the patient.    We will follow up with her after the liver biopsy is performed.  I spoke with the pulmonary team and explained our recommendations

## 2021-07-01 NOTE — PROGRESS NOTE ADULT - ASSESSMENT
66 y.o. 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       #Acute hypercarbic respiratory failure, hx of bronchiectasis, primary ciliary dyskinesia and COPD with diffuse wheezing noted on presentation. Hx of CR Pseudomonas in the past, will cover empirically with Avycaz overnight given severity of illness.   - BIpap transitioned to AVAPS use hs and prn during day   - Prednisone 40 QD   - Standing duonebs + Albuterol PRN + Hypertonic saline to facilitate airway clearance + home Symbicort   - Airway clearance device, chest PT IPV with duonebs and acapella in between   - Robitussin PRN   - Sputum cx reordered /pending cx from office   [ ] Repeat ABG with change to AVAPS    - Discussed code status with son at bedside, son will discuss with family    #Hx of primary ciliary dyskinesia c/b recurrent sinusitis,  bronchiectasis c/b multiple episodes of Pseudomonas PNA.  - Evaluated by ID, continue Avaycaz and inhaled tobra   - Receives inhaled Tobramycin Q every other month (last dose yesterday).   - See plan above    Unclear history of ABPA,  (allergic bronchopulmonary aspergillosis) negative aspergillus ag.   - outpatient pulmonary follow up with Dr. Young    #DM type 2  On Lantus 14 units Qhs + sliding scale Lispro with meals depending on BS level   - As patient on Bipap + starting steroid therapy, cont Lantus 10 units Qhs + Lispro CDI, hold mealtime Insulin for now   - Consistant carb diet, DASH , Mechanical soft   - A1C 8.9  - RISS     #COPD  Cont home inhalers, see plan above    #GERD  Cont PPI + Famotidine    #HTN  Cont home amlodipine    #Hepatic cirrhosis dx US, MRCP. HCV Ab reactive, RNA not detected.   HIV neg. RUPESH, Mitochondrial Ab, microsomal Ab assay, all negative. ceruloplasmin normal, alpha fetoprotein normal Ca 19-9 mildly elevated at 42  - OP Follow up with Hepatology    #DVT PPX   Lovenox subq      66 y.o. 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       #Acute hypercarbic respiratory failure, hx of bronchiectasis, primary ciliary dyskinesia and COPD with diffuse wheezing noted on presentation. Hx of CR Pseudomonas in the past, will cover empirically with Avycaz overnight given severity of illness.   - BIpap transitioned to AVAPS use hs and prn during day   - Prednisone 40 QD   - Standing duonebs + Albuterol PRN + Hypertonic saline to facilitate airway clearance + home Symbicort   - Airway clearance device, chest PT IPV with duonebs and acapella in between   - Mucomyst started with IPV   - Sputum cx reordered /pending cx from office   -6/30 prelim gram stain reveals mod gram neg rods, few gram positive coci in pairs, few yeast like cells  -c/w avycaz at 0.25mg/kg q8 hours (we will order as antibiotic non-formulary) for at least 7 days  -c/w inhaled tobramycin 300mg q12 hours  -awaiting sensitivities, will tailor as needed    - Repeat ABG with change to AVAPS    - Discussed code status with son at bedside, son will discuss with family    #Hx of primary ciliary dyskinesia c/b recurrent sinusitis,  bronchiectasis c/b multiple episodes of Pseudomonas PNA.  - Evaluated by ID, continue Avaycaz and inhaled tobra   - Receives inhaled Tobramycin Q every other month (last dose yesterday).   - See plan above    Unclear history of ABPA,  (allergic bronchopulmonary aspergillosis) negative aspergillus ag.   - outpatient pulmonary follow up with Dr. Young    #DM type 2  On Lantus 14 units Qhs + sliding scale Lispro with meals depending on BS level   - As patient on Bipap + starting steroid therapy, cont Lantus 10 units Qhs + Lispro CDI, hold mealtime Insulin for now   - Consistant carb diet, DASH , Mechanical soft   - A1C 8.9  - RISS     #COPD  Cont home inhalers, see plan above    #GERD  Cont PPI + Famotidine    #HTN  Cont home amlodipine    #Hepatic cirrhosis dx US, MRCP. HCV Ab reactive, RNA not detected.   HIV neg. RUPESH, Mitochondrial Ab, microsomal Ab assay, all negative. ceruloplasmin normal, alpha fetoprotein normal Ca 19-9 mildly elevated at 42  - Hepatology consulted regarding liver disease ? cirrohsis in regards to referral for lung transplant     #DVT PPX   Lovenox subq

## 2021-07-02 LAB
-  AMIKACIN: SIGNIFICANT CHANGE UP
-  AZTREONAM: SIGNIFICANT CHANGE UP
-  CEFEPIME: SIGNIFICANT CHANGE UP
-  CEFTAZIDIME: SIGNIFICANT CHANGE UP
-  CIPROFLOXACIN: SIGNIFICANT CHANGE UP
-  GENTAMICIN: SIGNIFICANT CHANGE UP
-  IMIPENEM: SIGNIFICANT CHANGE UP
-  LEVOFLOXACIN: SIGNIFICANT CHANGE UP
-  MEROPENEM: SIGNIFICANT CHANGE UP
-  PIPERACILLIN/TAZOBACTAM: SIGNIFICANT CHANGE UP
-  TOBRAMYCIN: SIGNIFICANT CHANGE UP
ALBUMIN SERPL ELPH-MCNC: 3.1 G/DL — LOW (ref 3.3–5)
ALP SERPL-CCNC: 132 U/L — HIGH (ref 40–120)
ALT FLD-CCNC: 31 U/L — SIGNIFICANT CHANGE UP (ref 4–33)
ANION GAP SERPL CALC-SCNC: 8 MMOL/L — SIGNIFICANT CHANGE UP (ref 7–14)
AST SERPL-CCNC: 29 U/L — SIGNIFICANT CHANGE UP (ref 4–32)
BACTERIA SPT CULT: ABNORMAL
BILIRUB DIRECT SERPL-MCNC: <0.2 MG/DL — SIGNIFICANT CHANGE UP (ref 0–0.2)
BILIRUB INDIRECT FLD-MCNC: SIGNIFICANT CHANGE UP MG/DL (ref 0–1)
BILIRUB SERPL-MCNC: <0.2 MG/DL — SIGNIFICANT CHANGE UP (ref 0.2–1.2)
BUN SERPL-MCNC: 14 MG/DL — SIGNIFICANT CHANGE UP (ref 7–23)
CALCIUM SERPL-MCNC: 9.2 MG/DL — SIGNIFICANT CHANGE UP (ref 8.4–10.5)
CHLORIDE SERPL-SCNC: 97 MMOL/L — LOW (ref 98–107)
CO2 SERPL-SCNC: 36 MMOL/L — HIGH (ref 22–31)
CREAT SERPL-MCNC: 0.41 MG/DL — LOW (ref 0.5–1.3)
CULTURE RESULTS: SIGNIFICANT CHANGE UP
GLUCOSE BLDC GLUCOMTR-MCNC: 192 MG/DL — HIGH (ref 70–99)
GLUCOSE BLDC GLUCOMTR-MCNC: 197 MG/DL — HIGH (ref 70–99)
GLUCOSE BLDC GLUCOMTR-MCNC: 212 MG/DL — HIGH (ref 70–99)
GLUCOSE BLDC GLUCOMTR-MCNC: 314 MG/DL — HIGH (ref 70–99)
GLUCOSE SERPL-MCNC: 103 MG/DL — HIGH (ref 70–99)
HCT VFR BLD CALC: 35.3 % — SIGNIFICANT CHANGE UP (ref 34.5–45)
HGB BLD-MCNC: 10.7 G/DL — LOW (ref 11.5–15.5)
LEGIONELLA AG UR QL: NEGATIVE — SIGNIFICANT CHANGE UP
MAGNESIUM SERPL-MCNC: 2 MG/DL — SIGNIFICANT CHANGE UP (ref 1.6–2.6)
MCHC RBC-ENTMCNC: 27.3 PG — SIGNIFICANT CHANGE UP (ref 27–34)
MCHC RBC-ENTMCNC: 30.3 GM/DL — LOW (ref 32–36)
MCV RBC AUTO: 90.1 FL — SIGNIFICANT CHANGE UP (ref 80–100)
METHOD TYPE: SIGNIFICANT CHANGE UP
NRBC # BLD: 0 /100 WBCS — SIGNIFICANT CHANGE UP
NRBC # FLD: 0 K/UL — SIGNIFICANT CHANGE UP
ORGANISM # SPEC MICROSCOPIC CNT: SIGNIFICANT CHANGE UP
ORGANISM # SPEC MICROSCOPIC CNT: SIGNIFICANT CHANGE UP
PHOSPHATE SERPL-MCNC: 2.9 MG/DL — SIGNIFICANT CHANGE UP (ref 2.5–4.5)
PLATELET # BLD AUTO: 170 K/UL — SIGNIFICANT CHANGE UP (ref 150–400)
POTASSIUM SERPL-MCNC: 3.8 MMOL/L — SIGNIFICANT CHANGE UP (ref 3.5–5.3)
POTASSIUM SERPL-SCNC: 3.8 MMOL/L — SIGNIFICANT CHANGE UP (ref 3.5–5.3)
PROT SERPL-MCNC: 6.1 G/DL — SIGNIFICANT CHANGE UP (ref 6–8.3)
RBC # BLD: 3.92 M/UL — SIGNIFICANT CHANGE UP (ref 3.8–5.2)
RBC # FLD: 13.5 % — SIGNIFICANT CHANGE UP (ref 10.3–14.5)
SODIUM SERPL-SCNC: 141 MMOL/L — SIGNIFICANT CHANGE UP (ref 135–145)
SPECIMEN SOURCE: SIGNIFICANT CHANGE UP
WBC # BLD: 9.18 K/UL — SIGNIFICANT CHANGE UP (ref 3.8–10.5)
WBC # FLD AUTO: 9.18 K/UL — SIGNIFICANT CHANGE UP (ref 3.8–10.5)

## 2021-07-02 PROCEDURE — 99233 SBSQ HOSP IP/OBS HIGH 50: CPT | Mod: GC,25

## 2021-07-02 PROCEDURE — 99222 1ST HOSP IP/OBS MODERATE 55: CPT

## 2021-07-02 PROCEDURE — 99232 SBSQ HOSP IP/OBS MODERATE 35: CPT

## 2021-07-02 RX ORDER — INSULIN LISPRO 100/ML
6 VIAL (ML) SUBCUTANEOUS
Refills: 0 | Status: DISCONTINUED | OUTPATIENT
Start: 2021-07-02 | End: 2021-07-02

## 2021-07-02 RX ORDER — INSULIN GLARGINE 100 [IU]/ML
15 INJECTION, SOLUTION SUBCUTANEOUS AT BEDTIME
Refills: 0 | Status: DISCONTINUED | OUTPATIENT
Start: 2021-07-02 | End: 2021-07-06

## 2021-07-02 RX ORDER — CEFEPIME 1 G/1
INJECTION, POWDER, FOR SOLUTION INTRAMUSCULAR; INTRAVENOUS
Refills: 0 | Status: DISCONTINUED | OUTPATIENT
Start: 2021-07-02 | End: 2021-07-07

## 2021-07-02 RX ORDER — CEFEPIME 1 G/1
2000 INJECTION, POWDER, FOR SOLUTION INTRAMUSCULAR; INTRAVENOUS ONCE
Refills: 0 | Status: COMPLETED | OUTPATIENT
Start: 2021-07-02 | End: 2021-07-02

## 2021-07-02 RX ORDER — SODIUM CHLORIDE 0.65 %
2 AEROSOL, SPRAY (ML) NASAL EVERY 12 HOURS
Refills: 0 | Status: DISCONTINUED | OUTPATIENT
Start: 2021-07-02 | End: 2021-07-19

## 2021-07-02 RX ORDER — INSULIN LISPRO 100/ML
3 VIAL (ML) SUBCUTANEOUS
Refills: 0 | Status: DISCONTINUED | OUTPATIENT
Start: 2021-07-02 | End: 2021-07-02

## 2021-07-02 RX ORDER — SENNA PLUS 8.6 MG/1
2 TABLET ORAL AT BEDTIME
Refills: 0 | Status: DISCONTINUED | OUTPATIENT
Start: 2021-07-02 | End: 2021-07-19

## 2021-07-02 RX ORDER — INSULIN LISPRO 100/ML
6 VIAL (ML) SUBCUTANEOUS
Refills: 0 | Status: DISCONTINUED | OUTPATIENT
Start: 2021-07-02 | End: 2021-07-03

## 2021-07-02 RX ORDER — CEFEPIME 1 G/1
2000 INJECTION, POWDER, FOR SOLUTION INTRAMUSCULAR; INTRAVENOUS EVERY 12 HOURS
Refills: 0 | Status: DISCONTINUED | OUTPATIENT
Start: 2021-07-03 | End: 2021-07-07

## 2021-07-02 RX ORDER — INSULIN LISPRO 100/ML
5 VIAL (ML) SUBCUTANEOUS
Refills: 0 | Status: DISCONTINUED | OUTPATIENT
Start: 2021-07-02 | End: 2021-07-02

## 2021-07-02 RX ORDER — POLYETHYLENE GLYCOL 3350 17 G/17G
17 POWDER, FOR SOLUTION ORAL DAILY
Refills: 0 | Status: COMPLETED | OUTPATIENT
Start: 2021-07-02 | End: 2021-07-09

## 2021-07-02 RX ORDER — FLUTICASONE PROPIONATE 50 MCG
2 SPRAY, SUSPENSION NASAL
Refills: 0 | Status: DISCONTINUED | OUTPATIENT
Start: 2021-07-02 | End: 2021-07-19

## 2021-07-02 RX ORDER — ACETYLCYSTEINE 200 MG/ML
4 VIAL (ML) MISCELLANEOUS EVERY 6 HOURS
Refills: 0 | Status: COMPLETED | OUTPATIENT
Start: 2021-07-02 | End: 2021-07-05

## 2021-07-02 RX ADMIN — ENOXAPARIN SODIUM 40 MILLIGRAM(S): 100 INJECTION SUBCUTANEOUS at 12:42

## 2021-07-02 RX ADMIN — Medication 3 MILLILITER(S): at 15:44

## 2021-07-02 RX ADMIN — Medication 6 UNIT(S): at 12:36

## 2021-07-02 RX ADMIN — CEFEPIME 100 MILLIGRAM(S): 1 INJECTION, POWDER, FOR SOLUTION INTRAMUSCULAR; INTRAVENOUS at 18:45

## 2021-07-02 RX ADMIN — Medication 4 MILLILITER(S): at 09:53

## 2021-07-02 RX ADMIN — MIRTAZAPINE 7.5 MILLIGRAM(S): 45 TABLET, ORALLY DISINTEGRATING ORAL at 21:42

## 2021-07-02 RX ADMIN — INSULIN GLARGINE 15 UNIT(S): 100 INJECTION, SOLUTION SUBCUTANEOUS at 21:41

## 2021-07-02 RX ADMIN — Medication 2: at 08:26

## 2021-07-02 RX ADMIN — MONTELUKAST 10 MILLIGRAM(S): 4 TABLET, CHEWABLE ORAL at 12:42

## 2021-07-02 RX ADMIN — Medication 500 MILLIGRAM(S): at 12:42

## 2021-07-02 RX ADMIN — PANTOPRAZOLE SODIUM 40 MILLIGRAM(S): 20 TABLET, DELAYED RELEASE ORAL at 05:38

## 2021-07-02 RX ADMIN — Medication 4 MILLILITER(S): at 15:44

## 2021-07-02 RX ADMIN — Medication 2 SPRAY(S): at 18:45

## 2021-07-02 RX ADMIN — Medication 4: at 17:10

## 2021-07-02 RX ADMIN — Medication 3 MILLILITER(S): at 03:31

## 2021-07-02 RX ADMIN — Medication 325 MILLIGRAM(S): at 12:42

## 2021-07-02 RX ADMIN — Medication 8: at 12:36

## 2021-07-02 RX ADMIN — Medication 3 MILLILITER(S): at 09:52

## 2021-07-02 RX ADMIN — BUDESONIDE AND FORMOTEROL FUMARATE DIHYDRATE 2 PUFF(S): 160; 4.5 AEROSOL RESPIRATORY (INHALATION) at 22:20

## 2021-07-02 RX ADMIN — Medication 6 UNIT(S): at 17:11

## 2021-07-02 RX ADMIN — AMLODIPINE BESYLATE 5 MILLIGRAM(S): 2.5 TABLET ORAL at 05:38

## 2021-07-02 RX ADMIN — Medication 1 SPRAY(S): at 05:38

## 2021-07-02 RX ADMIN — Medication 40 MILLIGRAM(S): at 05:38

## 2021-07-02 RX ADMIN — Medication 300 MILLIGRAM(S): at 09:53

## 2021-07-02 RX ADMIN — CHLORHEXIDINE GLUCONATE 1 APPLICATION(S): 213 SOLUTION TOPICAL at 12:42

## 2021-07-02 RX ADMIN — FAMOTIDINE 40 MILLIGRAM(S): 10 INJECTION INTRAVENOUS at 21:42

## 2021-07-02 RX ADMIN — Medication 3 UNIT(S): at 08:48

## 2021-07-02 RX ADMIN — BUDESONIDE AND FORMOTEROL FUMARATE DIHYDRATE 2 PUFF(S): 160; 4.5 AEROSOL RESPIRATORY (INHALATION) at 09:54

## 2021-07-02 RX ADMIN — Medication 3 MILLILITER(S): at 22:19

## 2021-07-02 NOTE — PROGRESS NOTE ADULT - ASSESSMENT
65 YO F with PMHx of Diffuse Cystic Bronchiectasis, Primary Ciliary Dyskinesia on 2L NC, Recurrent Sinusitis, ABPA (unclear history), HTN, GERD, IDDM2 A1C 8.9 (6/2021) and recent admission in April 2021 for  Pseudomonas PNA s/p Avycaz and NELLY. Patient now presented from Pulmonary Office, Dr. Young for worsening SOB, lethargy and cough with thick yellow secretions. Now readmitted for acute on chronic hypercarbic respiratory failure likely in setting of bronchiectasis exacerbation with recurrent PNA.     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   - CT CHEST 6/30 with (+) bronchiectasis and possible LLL area of bronchitis   - SCx 6/30 with Pseudomonas, pending final.   - Aspergillus AB negative from 8/2020  - Continue on aggressive chest PT with Mucomyst, Duoneb and Chest Vest Q6H   - Continue on Symbicort BID and Singular QD   - Continue on Flonase and [place] BID   - Continue Avycaz/ NELLY (6/30 - ) and Prednisone 40mg QD (6/30 - )   - Hypercarbia improved and now continued on NC QD and AVAPs QHS.   - Will likely need home NIV set up prior to DC   - Follow up with Dr. Young outpatient on DC     # Nodular Liver with possible Cirrhosis ?   - During admission in April patient was found to have nodular contour of liver on US 4/6/2021. Upon further discussion with patient and Hepatology, she was noted to have history of blood transfusion in 1998 with (+) Hep C AB with negative Hep C RNA. She also reports history of CBD stricture in 1998 in [place], s/p PTC/PTBD.   - CLD work up previously noted with HBsAg negative, HBcIgM negative, Hep E IgG negative, Hep A IgM negative, RUPESH, Anti-LKM and AMA negative, Ferritin 169, AFP within normal limits, ASMA 1:40. MR/MRCP without stricturing or hepatic lesions. No ascites on all imaging and no history of HE. No known varices/ no EGD record on record.   - Hepatology consulted who recommends liver bx     # IDDM2, A1C 8.9 (6/2021)   - On Lantus 14U QHS and sliding scale at home.   - Cotninue on Lantus 15U QHS and Lispro 6U + Moderate ISS in house while on steroids.   - Monitor FS and adjust insulin as needed.     # Ethics/ DISPO/ GOC  - Patient expresses wishes of DNI, however GOC to be further discussed with patient and Son today to finalize wishes.   - PT recommends home with home PT     # DVT PPX with Lovenox SQ QD   67 YO F with PMHx of Diffuse Cystic Bronchiectasis, Primary Ciliary Dyskinesia on 2L NC, Recurrent Sinusitis, ABPA (unclear history), HTN, GERD, IDDM2 A1C 8.9 (6/2021) and recent admission in April 2021 for  Pseudomonas PNA s/p Avycaz and NELLY. Patient now presented from Pulmonary Office, Dr. Young for worsening SOB, lethargy and cough with thick yellow secretions. Now readmitted for acute on chronic hypercarbic respiratory failure likely in setting of bronchiectasis exacerbation with recurrent PNA.     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   - CT CHEST 6/30 with (+) bronchiectasis and possible LLL area of bronchitis   - SCx 6/30 with Pseudomonas, pending final.   - Aspergillus AB negative from 8/2020  - Continue on aggressive chest PT with Mucomyst, Duoneb and Chest Vest Q6H   - Continue on Symbicort BID and Singular QD   - Continue Avycaz/ NELLY (6/30 - ) and Prednisone 40mg QD (6/30 - )   - Hypercarbia improved and now continued on NC QD and AVAPs QHS.   - Will likely need home NIV set up prior to DC   - Follow up with Dr. Young outpatient on DC for possible lung transplant    # Acute on Chronic Sinusitis   - CT SINUS 6/30 with extensive inflammatory changes throughout the paranasal sinuses and their respective drainage pathways, , improved in the right frontal and sphenoid sinuses since comparison. 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 Cannot rule out CSF leak. Thinning of the left carotid canal wall along the posterior left sphenoid sinus. Cannot rule out dehiscence.  - Continue on Flonase and [place] BID   - Pending ENT evaluation     # Nodular Liver with possible Cirrhosis ?   - During admission in April patient was found to have nodular contour of liver on US 4/6/2021. Upon further discussion with patient and Hepatology, she was noted to have history of blood transfusion in 1998 with (+) Hep C AB with negative Hep C RNA. She also reports history of CBD stricture in 1998 in [place], s/p PTC/PTBD.   - CLD work up previously noted with HBsAg negative, HBcIgM negative, Hep E IgG negative, Hep A IgM negative, RUPESH, Anti-LKM and AMA negative, Ferritin 169, AFP within normal limits, ASMA 1:40. MR/MRCP without stricturing or hepatic lesions. No ascites on all imaging and no history of HE. No known varices/ no EGD record on record.   - Hepatology consulted who recommends liver bx     # IDDM2, A1C 8.9 (6/2021)   - On Lantus 14U QHS and sliding scale at home.   - Cotninue on Lantus 15U QHS and Lispro 6U + Moderate ISS in house while on steroids.   - Monitor FS and adjust insulin as needed.     # Ethics/ DISPO/ GOC  - Patient expresses wishes of DNI, however GOC to be further discussed with patient and Son today to finalize wishes.   - PT recommends home with home PT     # DVT PPX with Lovenox SQ QD     67 YO F with PMHx of Diffuse Cystic Bronchiectasis, Primary Ciliary Dyskinesia on 2L NC, Recurrent Sinusitis, ABPA (unclear history), HTN, GERD, IDDM2 A1C 8.9 (6/2021) and recent admission in April 2021 for  Pseudomonas PNA s/p Avycaz and NELLY. Patient now presented from Pulmonary Office, Dr. Young for worsening SOB, lethargy and cough with thick yellow secretions. Now readmitted for acute on chronic hypercarbic respiratory failure likely in setting of bronchiectasis exacerbation with recurrent PNA.     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   - CT CHEST 6/30 with (+) bronchiectasis and possible LLL area of bronchitis   - Aspergillus AB negative from 8/2020  - SCx 6/30 with Pseudomonas, pending final.   - Continue on aggressive chest PT with Mucomyst, Duoneb and Chest Vest Q6H   - Continue on Symbicort BID and Singular QD   - Continue Avycaz/ NELLY (6/30 - ) and Prednisone 40mg QD (6/30 - 7/3)   - Hypercarbia improved and now continued on NC QD and AVAPs QHS.   - Will likely need home NIV set up prior to DC   - Follow up with Dr. Young outpatient on DC for possible lung transplant    # Acute on Chronic Sinusitis   - CT SINUS 6/30 with extensive inflammatory changes throughout the paranasal sinuses and their respective drainage pathways, , improved in the right frontal and sphenoid sinuses since comparison. 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 Cannot rule out CSF leak. Thinning of the left carotid canal wall along the posterior left sphenoid sinus. Cannot rule out dehiscence.  - Continue on Flonase and [place] BID   - MRI SINUSES pending   - ENT following    # Nodular Liver with possible Cirrhosis ?   - During admission in April patient was found to have nodular contour of liver on US 4/6/2021. Upon further discussion with patient and Hepatology, she was noted to have history of blood transfusion in 1998 with (+) Hep C AB with negative Hep C RNA. She also reports history of CBD stricture in 1998 in [place], s/p PTC/PTBD.   - CLD work up previously noted with HBsAg negative, HBcIgM negative, Hep E IgG negative, Hep A IgM negative, RUPESH, Anti-LKM and AMA negative, Ferritin 169, AFP within normal limits, ASMA 1:40. MR/MRCP without stricturing or hepatic lesions. No ascites on all imaging and no history of HE. No known varices/ no EGD record on record.   - Hepatology consulted who recommends liver bx     # IDDM2, A1C 8.9 (6/2021)   - On Lantus 14U QHS and sliding scale at home.   - Continue on Lantus 15U QHS and Lispro 6U + Moderate ISS in house while on steroids.   - Monitor FS and adjust insulin as needed.     # Ethics/ DISPO/ GOC  - Patient expresses wishes of DNI, however GOC to be further discussed with patient and Son today to finalize wishes.   - PT recommends home with home PT     # DVT PPX with Lovenox SQ QD     67 YO F with PMHx of Diffuse Cystic Bronchiectasis, Primary Ciliary Dyskinesia on 2L NC, Recurrent Sinusitis, ABPA (unclear history), HTN, GERD, IDDM2 A1C 8.9 (6/2021) and recent admission in April 2021 for  Pseudomonas PNA s/p Avycaz and NELLY. Patient now presented from Pulmonary Office, Dr. Young for worsening SOB, lethargy and cough with thick yellow secretions. Now readmitted for acute on chronic hypercarbic respiratory failure likely in setting of bronchiectasis exacerbation with recurrent PNA.     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   - CT CHEST 6/30 with (+) bronchiectasis and possible LLL area of bronchitis   - Aspergillus AB negative from 8/2020  - SCx 6/30 with Pseudomonas, pending final.   - Continue on aggressive chest PT with Mucomyst, Duoneb and Chest Vest Q6H   - Continue on Symbicort BID and Singular QD   - Continue Avycaz/ NELLY (6/30 - ) and Prednisone 40mg QD (6/30 - 7/3)   - Hypercarbia improved and now continued on NC QD and AVAPs QHS.   - Will likely need home NIV set up prior to DC   - Follow up with Dr. Young outpatient on DC for possible lung transplant    # Acute on Chronic Sinusitis   - CT SINUS 6/30 with extensive inflammatory changes throughout the paranasal sinuses and their respective drainage pathways, , improved in the right frontal and sphenoid sinuses since comparison. 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 Cannot rule out CSF leak. Thinning of the left carotid canal wall along the posterior left sphenoid sinus. Cannot rule out dehiscence.  - Continue on Flonase and [place] BID   - MRI SINUSES pending   - ENT following    # Nodular Liver with possible Cirrhosis ?   - During admission in April patient was found to have nodular contour of liver on US 4/6/2021. Upon further discussion with patient and Hepatology, she was noted to have history of blood transfusion in 1998 with (+) Hep C AB with negative Hep C RNA. She also reports history of CBD stricture in 1998 in [place], s/p PTC/PTBD.   - CLD work up previously noted with HBsAg negative, HBcIgM negative, Hep E IgG negative, Hep A IgM negative, RUPESH, Anti-LKM and AMA negative, Ferritin 169, AFP within normal limits, ASMA 1:40. MR/MRCP without stricturing or hepatic lesions. No ascites on all imaging and no history of HE. No known varices/ no EGD record on record.   - Hepatology consulted who recommends liver bx     # IDDM2, A1C 8.9 (6/2021)   - On Lantus 14U QHS and sliding scale at home.   - Continue on Lantus 15U QHS and Lispro 6U + Moderate ISS in house while on steroids.   - Monitor FS and adjust insulin as needed.     # Ethics/ DISPO/ GOC  - FULL CODE and GOC to be further discussed.    - PT recommends home with home PT     # DVT PPX with Lovenox SQ QD     67 YO [place] speaking Female with PMHx of Diffuse Cystic Bronchiectasis, Primary Ciliary Dyskinesia on 2L NC, Recurrent Sinusitis, ABPA (unclear history), HTN, GERD, IDDM2 A1C 8.9 (6/2021) and recent admission in April 2021 for  Pseudomonas PNA s/p Avycaz and NELLY. Patient now presented from Pulmonary Office, Dr. Young for worsening SOB, lethargy and cough with thick yellow secretions. Now readmitted for acute on chronic hypercarbic respiratory failure likely in setting of bronchiectasis exacerbation with recurrent PNA.     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   - CT CHEST 6/30 with (+) bronchiectasis and possible LLL area of bronchitis   - Aspergillus AB negative from 8/2020  - SCx 6/30 with Pseudomonas, pending final.   - Continue on aggressive chest PT with Mucomyst, Duoneb and Chest Vest Q6H   - Continue on Symbicort BID and Singular QD   - Continue Avycaz/ NELLY (6/30 - ) and Prednisone 40mg QD (6/30 - 7/3)   - Hypercarbia improved and now continued on NC QD and AVAPs QHS.   - Will likely need home NIV set up prior to DC   - Follow up with Dr. Young outpatient on DC for possible lung transplant    # Acute on Chronic Sinusitis   - CT SINUS 6/30 with extensive inflammatory changes throughout the paranasal sinuses and their respective drainage pathways, , improved in the right frontal and sphenoid sinuses since comparison. 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 Cannot rule out CSF leak. Thinning of the left carotid canal wall along the posterior left sphenoid sinus. Cannot rule out dehiscence.  - Continue on Flonase and [place] BID   - MRI SINUSES pending   - ENT following    # Nodular Liver with possible Cirrhosis ?   - During admission in April patient was found to have nodular contour of liver on US 4/6/2021. Upon further discussion with patient and Hepatology, she was noted to have history of blood transfusion in 1998 with (+) Hep C AB with negative Hep C RNA. She also reports history of CBD stricture in 1998 in [place], s/p PTC/PTBD.   - CLD work up previously noted with HBsAg negative, HBcIgM negative, Hep E IgG negative, Hep A IgM negative, RUPESH, Anti-LKM and AMA negative, Ferritin 169, AFP within normal limits, ASMA 1:40. MR/MRCP without stricturing or hepatic lesions. No ascites on all imaging and no history of HE. No known varices/ no EGD record on record.   - Hepatology consulted who recommends liver bx     # IDDM2, A1C 8.9 (6/2021)   - On Lantus 14U QHS and sliding scale at home.   - Continue on Lantus 15U QHS and Lispro 6U + Moderate ISS in house while on steroids.   - Monitor FS and adjust insulin as needed.     # Ethics/ DISPO/ GOC  - FULL CODE and GOC discussed with SonMichela ([phone]) who expresses wishes of DNI, however will discuss further for DNR. Family wishes for all medical treatment (including invasive and possible lung transplant) to be performed, however if her condition was to worsen they do not wish for intubation. GOC conversation ongoing.   - PT recommends home with home PT     # DVT PPX with Lovenox SQ QD

## 2021-07-02 NOTE — PROGRESS NOTE ADULT - SUBJECTIVE AND OBJECTIVE BOX
Follow Up:  bronchiectasis    Interval History/ROS:  feeling better.  sputum resulted- pseudomonas     Allergies  No Known Allergies        ANTIMICROBIALS:  ceftazidime/avibactam IVPB 2.5 every 8 hours  tobramycin for Nebulization 300 every 12 hours      OTHER MEDS:  MEDICATIONS  (STANDING):  acetaminophen   Tablet .. 650 every 4 hours PRN  ALBUTerol    0.083% 2.5 every 6 hours PRN  albuterol/ipratropium for Nebulization 3 every 6 hours  amLODIPine   Tablet 5 daily  budesonide 160 MICROgram(s)/formoterol 4.5 MICROgram(s) Inhaler 2 two times a day  dextrose 40% Gel 15 once  dextrose 50% Injectable 25 once  dextrose 50% Injectable 12.5 once  dextrose 50% Injectable 25 once  enoxaparin Injectable 40 daily  famotidine    Tablet 40 at bedtime  glucagon  Injectable 1 once  guaiFENesin Oral Liquid (Sugar-Free) 100 every 6 hours PRN  insulin glargine Injectable (LANTUS) 10 at bedtime  insulin lispro (ADMELOG) corrective regimen sliding scale  three times a day before meals  insulin lispro (ADMELOG) corrective regimen sliding scale  at bedtime  mirtazapine 7.5 at bedtime  montelukast 10 daily  pantoprazole    Tablet 40 before breakfast  predniSONE   Tablet 40 daily  sodium chloride 3%  Inhalation 4 four times a day    Vital Signs Last 24 Hrs  T(F): 97.6 (07-02-21 @ 13:05), Max: 98.2 (07-02-21 @ 05:00)  HR: 91 (07-02-21 @ 15:51)  BP: 119/62 (07-02-21 @ 13:05)  RR: 18 (07-02-21 @ 13:05)  SpO2: 100% (07-02-21 @ 15:51) (96% - 100%)    PHYSICAL EXAM:  GA: NAD, sitting in chair  HEENT: oral cavity no lesion  CV: nl S1/S2, no RMG  Lungs: scattered rhonchi  Abd: BS+, soft, nontender, no rebounding pain  Ext: no edema  Skin: no rash  IV: no phlebitis                          10.7   9.18  )-----------( 170      ( 02 Jul 2021 07:09 )             35.3 07-02    141  |  97  |  14  ----------------------------<  103  3.8   |  36  |  0.41  Ca    9.2      02 Jul 2021 07:09Phos  2.9     07-02Mg     2.00     07-02  TPro  6.1  /  Alb  3.1  /  TBili  <0.2  /  DBili  <0.2  /  AST  29  /  ALT  31  /  AlkPhos  132  07-02          MICROBIOLOGY:    Culture - Sputum . (06.30.21 @ 10:24)   - Meropenem: S <=1   - Piperacillin/Tazobactam: S <=8   - Tobramycin: R >8   Gram Stain:   Numerous polymorphonuclear leukocytes per low power field   Few Squamous epithelial cells per low power field   Moderate Gram Negative Rods per oil power field   Few Gram positive cocci in pairs per oil power field   Few Yeast like cells per oil power field   - Amikacin: S <=16   - Aztreonam: S <=4   - Cefepime: S <=2   - Ceftazidime: S <=1   - Ciprofloxacin: I 1   - Gentamicin: R >8   - Imipenem: S <=1   - Levofloxacin: S <=0.5   Specimen Source: .Sputum Sputum   Culture Results:   Moderate Pseudomonas aeruginosa   Normal Respiratory Denise present   Organism Identification: Pseudomonas aeruginosa   Organism: Pseudomonas aeruginosa   Method Type: ELIEZER       Rapid RVP Result: NotDetec (06-29 @ 15:50)        RADIOLOGY:    rd< from: CT Chest w/ IV Cont (06.30.21 @ 22:21) >  IMPRESSION:      When compared with March 31, 2021:    Stable areas of bronchial dilatation and bronchial wall thickening throughout both lungs with sparing of the costophrenic angles bilaterally. Associated mucoid impaction is noted. Findings in keeping with known ciliary dyskinesia.      Small focus of consolidation in the left lower lobe with associated tree-in-bud nodularity consistent with focal area of bronchiolitis/infection.            BEBETO HOLLINGSWORTH MD; Resident Radiologist  This document has been electronically signed.    < end of copied text >

## 2021-07-02 NOTE — PROGRESS NOTE ADULT - ASSESSMENT
66 y.o. 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 Treated empirically with Avycaz and inhaled tobra.  Clinically improving day #3 antibiotics.  Sputum culture resulted- pseudomonas S zosyn, cefepime, meropenem; R tobra.      #Pseudomonas Pneumonia  -recent hospital admission for pseudomonas pneumonia, risk factors of bronchiectasis, structural lung abnormality (primary ciliary dyskinesia), CXR this admission shows chronic bronchiectasis/bronchial wall thickening, prior left upper lobe consolidation resolved    Suggest:  -d/c  avycaz   -d/c inhaled tobramycin   -start cefepime 2 gm iv q 8 h --> 5/6  -d/c contact precautions    ID service available this weekend.  Call with questions or change in status.    Cheli Mendoza MD  Pager: [phone]  After 5 PM or weekends please call fellow on call or office [phone]

## 2021-07-02 NOTE — PROGRESS NOTE ADULT - SUBJECTIVE AND OBJECTIVE BOX
CHIEF COMPLAINT: Patient is a 66y old  Female who presents with a chief complaint of SOB (01 Jul 2021 09:31)    INTERVAL EVENTS:   - No interval events overnight.     ROS: Seen by bedside during AM rounds     OBJECTIVE:  ICU Vital Signs Last 24 Hrs  T(C): 36.8 (02 Jul 2021 05:00), Max: 36.8 (02 Jul 2021 05:00)  T(F): 98.2 (02 Jul 2021 05:00), Max: 98.2 (02 Jul 2021 05:00)  HR: 79 (02 Jul 2021 05:00) (77 - 99)  BP: 126/65 (02 Jul 2021 05:00) (126/65 - 144/70)  BP(mean): --  ABP: --  ABP(mean): --  RR: 20 (02 Jul 2021 05:00) (20 - 20)  SpO2: 97% (02 Jul 2021 05:00) (92% - 100%)    Mode: NIV (Noninvasive Ventilation), RR (machine): 18, TV (machine): 380, FiO2: 28, PEEP: 5, ITime: 1, PIP: 19    CAPILLARY BLOOD GLUCOSE  POCT Blood Glucose.: 391 mg/dL (01 Jul 2021 21:24)    PHYSICAL EXAM:  General:   HEENT:   Lymph Nodes:  Neck:   Respiratory:   Cardiovascular:   Abdomen:   Extremities:   Skin:   Neurological:  Psychiatry:    Mode: NIV (Noninvasive Ventilation)  RR (machine): 18  TV (machine): 380  FiO2: 28  PEEP: 5  ITime: 1  PIP: 19    HOSPITAL MEDICATIONS:  MEDICATIONS  (STANDING):  acetylcysteine 10%  Inhalation 4 milliLiter(s) Inhalation every 6 hours  albuterol/ipratropium for Nebulization 3 milliLiter(s) Nebulizer every 6 hours  amLODIPine   Tablet 5 milliGRAM(s) Oral daily  ascorbic acid 500 milliGRAM(s) Oral daily  budesonide 160 MICROgram(s)/formoterol 4.5 MICROgram(s) Inhaler 2 Puff(s) Inhalation two times a day  ceftazidime/avibactam IVPB 2.5 Gram(s) IV Intermittent every 8 hours  chlorhexidine 4% Liquid 1 Application(s) Topical daily  dextrose 40% Gel 15 Gram(s) Oral once  dextrose 5%. 1000 milliLiter(s) (50 mL/Hr) IV Continuous <Continuous>  dextrose 5%. 1000 milliLiter(s) (100 mL/Hr) IV Continuous <Continuous>  dextrose 50% Injectable 25 Gram(s) IV Push once  dextrose 50% Injectable 12.5 Gram(s) IV Push once  dextrose 50% Injectable 25 Gram(s) IV Push once  enoxaparin Injectable 40 milliGRAM(s) SubCutaneous daily  famotidine    Tablet 40 milliGRAM(s) Oral at bedtime  ferrous    sulfate 325 milliGRAM(s) Oral daily  fluticasone propionate 50 MICROgram(s)/spray Nasal Spray 2 Spray(s) Both Nostrils two times a day  glucagon  Injectable 1 milliGRAM(s) IntraMuscular once  insulin glargine Injectable (LANTUS) 15 Unit(s) SubCutaneous at bedtime  insulin lispro (ADMELOG) corrective regimen sliding scale   SubCutaneous three times a day before meals  insulin lispro (ADMELOG) corrective regimen sliding scale   SubCutaneous at bedtime  insulin lispro Injectable (ADMELOG) 6 Unit(s) SubCutaneous three times a day before meals  mirtazapine 7.5 milliGRAM(s) Oral at bedtime  montelukast 10 milliGRAM(s) Oral daily  pantoprazole    Tablet 40 milliGRAM(s) Oral before breakfast  predniSONE   Tablet 40 milliGRAM(s) Oral daily  sodium chloride 0.65% Nasal 2 Spray(s) Both Nostrils every 12 hours  tobramycin for Nebulization 300 milliGRAM(s) Inhalation every 12 hours    MEDICATIONS  (PRN):  acetaminophen   Tablet .. 650 milliGRAM(s) Oral every 4 hours PRN Mild Pain (1 - 3)  guaiFENesin Oral Liquid (Sugar-Free) 100 milliGRAM(s) Oral every 6 hours PRN Cough    LABS:                        10.7   9.18  )-----------( 170      ( 02 Jul 2021 07:09 )             35.3     07-01    140  |  95<L>  |  11  ----------------------------<  123<H>  3.5   |  40<H>  |  0.38<L>    Ca    9.1      01 Jul 2021 07:32  Phos  2.6     07-01  Mg     2.00     07-01    Arterial Blood Gas:  06-30 @ 10:31  7.39/76/53/41/87.5/19.2  ABG lactate: -- CHIEF COMPLAINT: Patient is a 66y old  Female who presents with a chief complaint of SOB (01 Jul 2021 09:31)    INTERVAL EVENTS:   - No interval events overnight.   - Chronic sinusitis and noted with congestion. Flonase and [place]son continued, however CT SINUSES with ? Ethmoid roof deterioration and CSF leak. No active symptoms of CSF leak (no rhinorrhea). ENT called and pending MRI SINUSES.   - Respiratory status improving and last day of steroids tomorrow.   - FS running high and Bolus insulin increased to 6U TID while on steroids.     ROS: Seen by bedside during AM rounds and case discussed with Daughter-In-Law, Margret translated over the phone. Patient notes improvement in SOB, however reports difficulty with removing AVAP mask overnight to spit out secretion. Overall SOB improving and denies pain (chest or abdominal).     OBJECTIVE:  ICU Vital Signs Last 24 Hrs  T(C): 36.8 (02 Jul 2021 05:00), Max: 36.8 (02 Jul 2021 05:00)  T(F): 98.2 (02 Jul 2021 05:00), Max: 98.2 (02 Jul 2021 05:00)  HR: 79 (02 Jul 2021 05:00) (77 - 99)  BP: 126/65 (02 Jul 2021 05:00) (126/65 - 144/70)  BP(mean): --  ABP: --  ABP(mean): --  RR: 20 (02 Jul 2021 05:00) (20 - 20)  SpO2: 97% (02 Jul 2021 05:00) (92% - 100%)    Mode: NIV (Noninvasive Ventilation), RR (machine): 18, TV (machine): 380, FiO2: 28, PEEP: 5, ITime: 1, PIP: 19    CAPILLARY BLOOD GLUCOSE  POCT Blood Glucose.: 391 mg/dL (01 Jul 2021 21:24)    PHYSICAL EXAM:  General: NAD and well groomed.   HEENT: NC/ AT and PERRLA. No rhinorrhea.   Cardio: RRR, S1/S2, no murmurs or rubs.   Pulm: (+) Rhonchi   GI: Soft, NDNT, BS (+).   MS: No pedal edema    Neuro: AOx. No focal neurological deficits noted.   Skin: Warm and dry. No jaundice or cyanosis     Mode: NIV (Noninvasive Ventilation)  RR (machine): 18  TV (machine): 380  FiO2: 28  PEEP: 5  ITime: 1  PIP: 19    HOSPITAL MEDICATIONS:  MEDICATIONS  (STANDING):  acetylcysteine 10%  Inhalation 4 milliLiter(s) Inhalation every 6 hours  albuterol/ipratropium for Nebulization 3 milliLiter(s) Nebulizer every 6 hours  amLODIPine   Tablet 5 milliGRAM(s) Oral daily  ascorbic acid 500 milliGRAM(s) Oral daily  budesonide 160 MICROgram(s)/formoterol 4.5 MICROgram(s) Inhaler 2 Puff(s) Inhalation two times a day  ceftazidime/avibactam IVPB 2.5 Gram(s) IV Intermittent every 8 hours  chlorhexidine 4% Liquid 1 Application(s) Topical daily  dextrose 40% Gel 15 Gram(s) Oral once  dextrose 5%. 1000 milliLiter(s) (50 mL/Hr) IV Continuous <Continuous>  dextrose 5%. 1000 milliLiter(s) (100 mL/Hr) IV Continuous <Continuous>  dextrose 50% Injectable 25 Gram(s) IV Push once  dextrose 50% Injectable 12.5 Gram(s) IV Push once  dextrose 50% Injectable 25 Gram(s) IV Push once  enoxaparin Injectable 40 milliGRAM(s) SubCutaneous daily  famotidine    Tablet 40 milliGRAM(s) Oral at bedtime  ferrous    sulfate 325 milliGRAM(s) Oral daily  fluticasone propionate 50 MICROgram(s)/spray Nasal Spray 2 Spray(s) Both Nostrils two times a day  glucagon  Injectable 1 milliGRAM(s) IntraMuscular once  insulin glargine Injectable (LANTUS) 15 Unit(s) SubCutaneous at bedtime  insulin lispro (ADMELOG) corrective regimen sliding scale   SubCutaneous three times a day before meals  insulin lispro (ADMELOG) corrective regimen sliding scale   SubCutaneous at bedtime  insulin lispro Injectable (ADMELOG) 6 Unit(s) SubCutaneous three times a day before meals  mirtazapine 7.5 milliGRAM(s) Oral at bedtime  montelukast 10 milliGRAM(s) Oral daily  pantoprazole    Tablet 40 milliGRAM(s) Oral before breakfast  predniSONE   Tablet 40 milliGRAM(s) Oral daily  sodium chloride 0.65% Nasal 2 Spray(s) Both Nostrils every 12 hours  tobramycin for Nebulization 300 milliGRAM(s) Inhalation every 12 hours    MEDICATIONS  (PRN):  acetaminophen   Tablet .. 650 milliGRAM(s) Oral every 4 hours PRN Mild Pain (1 - 3)  guaiFENesin Oral Liquid (Sugar-Free) 100 milliGRAM(s) Oral every 6 hours PRN Cough    LABS:                        10.7   9.18  )-----------( 170      ( 02 Jul 2021 07:09 )             35.3     07-01    140  |  95<L>  |  11  ----------------------------<  123<H>  3.5   |  40<H>  |  0.38<L>    Ca    9.1      01 Jul 2021 07:32  Phos  2.6     07-01  Mg     2.00     07-01    Arterial Blood Gas:  06-30 @ 10:31  7.39/76/53/41/87.5/19.2  ABG lactate: --

## 2021-07-02 NOTE — CONSULT NOTE ADULT - SUBJECTIVE AND OBJECTIVE BOX
HPI:  Patient is a 66y Female with PMH significant for diffuse cystic bronchiectasis, primary ciliary bronchiectasis, primary ciliary dyskinesia on 2L home O2. She has chronic issues with her lungs, recently admitted for pseudomonas pneumonia, now admitted again with acute respiratory failure likely  secondary to pneumonia. She has been on multiple steroid courses/tapers for lung disease. ENT consulted for chronic sinus issues and CT findings that showed possible skull base defect.     Patient reports 6 months of worsening sinus symptoms characterized as nasal congestion, rhinorrhea, facial pain/pressure. She takes daily flonase and uses rinses but says these don't help her very much. She denies associated itchy/watery eyes or allergies. No history of sinus surgeries in the past.    Physical Exam    T(C): 36.4 (07-02-21 @ 13:05), Max: 36.8 (07-02-21 @ 05:00)  HR: 94 (07-02-21 @ 13:05) (77 - 98)  BP: 119/62 (07-02-21 @ 13:05) (119/62 - 144/70)  RR: 18 (07-02-21 @ 13:05) (18 - 20)  SpO2: 96% (07-02-21 @ 13:05) (92% - 100%)    General: NAD, A+Ox3  No respiratory distress, stridor, or stertor  Voice quality: normal  Face:  Symmetric without masses or lesions  OU: PERRL, EOMI  Nose: nasal cavity clear bilaterally, inferior turbinates normal, mucosa normal without crusting or bleeding  OC/OP: tongue normal, floor of mouth wnl, no masses or lesions, OP clear  Neck: soft/flat, no LAD  CNII-XII intact    Procedure: Flexible nasal endoscopy    Pre-procedure diagnosis/Indication for procedure: To evaluate the nasal cavity     Anesthesia: None    Description:    A flexible laryngoscope was passed into the right nasal cavity. There is evidence of thick mucus and some inflammatory mucosal changes though no polyps or masses noted. MM and SE recess clear.     The left nasal cavity similarly with thickened mucus with clear MM and SE recess    CT SINUS:     -Extensive inflammatory changes throughout the paranasal sinuses and their respective drainage pathways, improved in the right frontal and sphenoid sinuses since comparison study. Possible acute on chronic maxillary sinusitis bilaterally.    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 Cannot rule out CSF leak. Difficult to assess whether this defect is present on the comparison study. This can be further evaluated with MRI of the sinuses with dedicated T2 and postcontrast coronal sequences.    -Thinning of the left carotid canal wall along the posterior left sphenoid sinus. Cannot rule out dehiscence.    -Sinonasal anatomic variations as detailed above.

## 2021-07-02 NOTE — PROGRESS NOTE ADULT - ATTENDING COMMENTS
Agree with plan as outlined above. Patient seen and examined at bedside. Patient history, laboratory data, and imaging personally reviewed.     Pt is a 66F with PMHx HCV nodular cirrhosis, HTN, IDDMII, osteoporosis, hx ABPA (s/p Itraconazole in 2015), and diffuse cystic bronchiectasis possibly 2/2 primary ciliary dyskinesia (sinusitis, bronchiectasis, and recurrent infections) c/b chronic Stenotrophomonas and Pseudomonas colonization with severe obstructive lung dz (PFTs 11/2017 FEV1/FVC: 39%/43% (69%) TLC 84%, %, DLCO 66%) c/b acute ILD exacerbation with acute hypercapnic respiratory failure despite O/P mgmt admitted to [facility] for further management.     Pt initially p/w acute hypercapnic respiratory failure requiring NIV Bilevel support with failure of improvement, transitioned to AVAPS with significant clinical improvement and improved ABG. Now tolerating NC QD with AVAPS qhs and prn. Pt also initially with persistent thick and sticky mucous with difficulty expectorating. Airway clearance therapy adjusted to DuoNebs followed by MucoMyst followed by vest therapy (pt unable to hold MetaNebs to mouth for entire session) q4-6hrs. Will also c/w Aerobika and incentive spirometry at bedside for independent use. Pt showing clinical improvement today. Home inhaler regimen: Albuterol+ Hypertonic saline + Aerobika TID as well as Symbicort BID+ Spiriva qhs with nebulized tobramycin every other month (given through 6/29). Chest CT and sinuses performed. CT Chest c/w growly unchanged b/l areas of bronchial dilatation and bronchial wall thickening as well as small area of acute GGO in LLL. Prednisone 40mg QD through 7/3. ENT consult (pt sees Dr. Trey Singh O/P) given concern for area of dehiscence in the setting of known chronic rhinosinusitis with underlying PCD and structural abnormalities, especially with initiation of NIV.      Repeat sputum cx speciation still pending from this admission. O/P sputum cx from 6/29 in [employer] (+) GNR, but not yet speciated. Prior sputum cx from 4/4/21 shows carbapenem resistant Pseudomonas (sensitive to tobramycin and amikacin). Pt with multiple prior sputum cx (+) for Pseudomonas, but more sensitive. Pt also with (+) Stenotrophomonas (R: Levofloxacin and Ceftazidime; S: Bactrim) on 7/31/18. AFB (-) on multiple occasions. Pt initiated on Avycaz IV + nebulized tobramycin on 6/30. Will discuss need for chronic management with alternating 2nd nebulized Abx with tobramycin as well.     Nutrition consulted. Glucerna added to soft diet. Swallow evaluation with MBS wnl on prior admission. Hepatology consult placed for hx cirrhosis and possibility of ultimate lung transplant referral, recommend liver biopsy as initial step to evaluate for cirrhosis. DMII management with ISS and BGFS. DVT ppx with Lovenox. GI ppx in place. TTE pending.    Dispo pending medical optimization. Pt full code. Discussed plan with pt and daughter at bedside. PT consulted.

## 2021-07-02 NOTE — CONSULT NOTE ADULT - ASSESSMENT
66yoF history of cystic bronchiectasis, primary ciliary dyskinesia with chronic pulmonary issues as a result. ENT consulted for chronic sinus issues as well as findings on CT scan of possible skull base defect of ethmoid roof. Patient currently without signs or symptoms of CSF rhinorrhea.     -Recommend MRI sinuses with contrast  -Will arrange close follow-up with our rhinologist  -Continue nasal saline sinus rinses BID, Flonase 2 sprays each nostril BID   -Rest of care per prrimary team   -Will d/w attending and update on final recs

## 2021-07-03 ENCOUNTER — TRANSCRIPTION ENCOUNTER (OUTPATIENT)
Age: 66
End: 2021-07-03

## 2021-07-03 LAB
GLUCOSE BLDC GLUCOMTR-MCNC: 120 MG/DL — HIGH (ref 70–99)
GLUCOSE BLDC GLUCOMTR-MCNC: 121 MG/DL — HIGH (ref 70–99)
GLUCOSE BLDC GLUCOMTR-MCNC: 133 MG/DL — HIGH (ref 70–99)
GLUCOSE BLDC GLUCOMTR-MCNC: 279 MG/DL — HIGH (ref 70–99)
GLUCOSE BLDC GLUCOMTR-MCNC: 295 MG/DL — HIGH (ref 70–99)

## 2021-07-03 PROCEDURE — 99233 SBSQ HOSP IP/OBS HIGH 50: CPT | Mod: GC

## 2021-07-03 RX ORDER — INSULIN LISPRO 100/ML
8 VIAL (ML) SUBCUTANEOUS
Refills: 0 | Status: DISCONTINUED | OUTPATIENT
Start: 2021-07-03 | End: 2021-07-19

## 2021-07-03 RX ADMIN — Medication 2 SPRAY(S): at 06:06

## 2021-07-03 RX ADMIN — Medication 4 MILLILITER(S): at 22:55

## 2021-07-03 RX ADMIN — BUDESONIDE AND FORMOTEROL FUMARATE DIHYDRATE 2 PUFF(S): 160; 4.5 AEROSOL RESPIRATORY (INHALATION) at 09:41

## 2021-07-03 RX ADMIN — Medication 500 MILLIGRAM(S): at 12:37

## 2021-07-03 RX ADMIN — PANTOPRAZOLE SODIUM 40 MILLIGRAM(S): 20 TABLET, DELAYED RELEASE ORAL at 06:17

## 2021-07-03 RX ADMIN — Medication 6 UNIT(S): at 08:28

## 2021-07-03 RX ADMIN — Medication 6: at 12:35

## 2021-07-03 RX ADMIN — FAMOTIDINE 40 MILLIGRAM(S): 10 INJECTION INTRAVENOUS at 21:43

## 2021-07-03 RX ADMIN — MIRTAZAPINE 7.5 MILLIGRAM(S): 45 TABLET, ORALLY DISINTEGRATING ORAL at 21:43

## 2021-07-03 RX ADMIN — BUDESONIDE AND FORMOTEROL FUMARATE DIHYDRATE 2 PUFF(S): 160; 4.5 AEROSOL RESPIRATORY (INHALATION) at 22:55

## 2021-07-03 RX ADMIN — CEFEPIME 100 MILLIGRAM(S): 1 INJECTION, POWDER, FOR SOLUTION INTRAMUSCULAR; INTRAVENOUS at 06:30

## 2021-07-03 RX ADMIN — SENNA PLUS 2 TABLET(S): 8.6 TABLET ORAL at 21:44

## 2021-07-03 RX ADMIN — Medication 3 MILLILITER(S): at 04:24

## 2021-07-03 RX ADMIN — Medication 3 MILLILITER(S): at 22:55

## 2021-07-03 RX ADMIN — CHLORHEXIDINE GLUCONATE 1 APPLICATION(S): 213 SOLUTION TOPICAL at 12:45

## 2021-07-03 RX ADMIN — POLYETHYLENE GLYCOL 3350 17 GRAM(S): 17 POWDER, FOR SOLUTION ORAL at 12:37

## 2021-07-03 RX ADMIN — Medication 8 UNIT(S): at 17:32

## 2021-07-03 RX ADMIN — MONTELUKAST 10 MILLIGRAM(S): 4 TABLET, CHEWABLE ORAL at 12:36

## 2021-07-03 RX ADMIN — Medication 2 SPRAY(S): at 17:33

## 2021-07-03 RX ADMIN — INSULIN GLARGINE 15 UNIT(S): 100 INJECTION, SOLUTION SUBCUTANEOUS at 21:47

## 2021-07-03 RX ADMIN — Medication 3 MILLILITER(S): at 16:14

## 2021-07-03 RX ADMIN — Medication 4 MILLILITER(S): at 09:41

## 2021-07-03 RX ADMIN — CEFEPIME 100 MILLIGRAM(S): 1 INJECTION, POWDER, FOR SOLUTION INTRAMUSCULAR; INTRAVENOUS at 17:33

## 2021-07-03 RX ADMIN — Medication 4 MILLILITER(S): at 16:15

## 2021-07-03 RX ADMIN — Medication 3 MILLILITER(S): at 09:40

## 2021-07-03 RX ADMIN — ENOXAPARIN SODIUM 40 MILLIGRAM(S): 100 INJECTION SUBCUTANEOUS at 12:36

## 2021-07-03 RX ADMIN — Medication 40 MILLIGRAM(S): at 06:05

## 2021-07-03 RX ADMIN — Medication 325 MILLIGRAM(S): at 12:36

## 2021-07-03 RX ADMIN — AMLODIPINE BESYLATE 5 MILLIGRAM(S): 2.5 TABLET ORAL at 06:05

## 2021-07-03 RX ADMIN — Medication 6: at 17:30

## 2021-07-03 NOTE — DISCHARGE NOTE PROVIDER - DETAILS OF MALNUTRITION DIAGNOSIS/DIAGNOSES
This patient has been assessed with a concern for Malnutrition and was treated during this hospitalization for the following Nutrition diagnosis/diagnoses:     -  07/08/2021: Severe protein-calorie malnutrition   -  07/08/2021: Underweight (BMI < 19)

## 2021-07-03 NOTE — DISCHARGE NOTE PROVIDER - CARE PROVIDER_API CALL
Shelli Young)  Critical Care Medicine; Pulmonary Disease  [address] [place] [address]  Phone: [phone]  Fax: [phone]  Follow Up Time:

## 2021-07-03 NOTE — DISCHARGE NOTE PROVIDER - NSDCMRMEDTOKEN_GEN_ALL_CORE_FT
Admelog SoloStar 100 units/mL injectable solution: 5 unit(s) injectable before breakfast, 3 units before lunch and 3 units before dinner.   amLODIPine 5 mg oral tablet: 1 tab(s) orally once a day  Basaglar KwikPen 100 units/mL subcutaneous solution: 14 unit(s) subcutaneous once a day (at bedtime)   cyanocobalamin 1000 mcg/mL injectable solution: 1000 microgram(s) injectable every 7 days on Wednesday  famotidine 40 mg oral tablet: 1 tab(s) orally once a day (at bedtime)  ferrous gluconate 324 mg (37.5 mg elemental iron) oral tablet: 1 tab(s) orally 2 times a day  Flonase 50 mcg/inh nasal spray: 1 spray(s) in each nostril once a day  mirtazapine 7.5 mg oral tablet: 1 tab(s) orally once a day (at bedtime)  montelukast 10 mg oral tablet: 1 tab(s) orally once a day  NebuSal: 4 milliliter(s) inhaled 2 times a day  omeprazole 20 mg oral delayed release capsule: 1 cap(s) orally once a day  predniSONE 10 mg oral tablet: 1 tab(s) orally once a day  Spiriva 18 mcg inhalation capsule: 1 cap(s) inhaled once a day  Symbicort 160 mcg-4.5 mcg/inh inhalation aerosol: 2 puff(s) inhaled 2 times a day  tobramycin 75 mg/mL inhalation solution: 4 milliliter(s) inhaled every 8 weeks  Vitamin C 500 mg oral tablet: 1 tab(s) orally once a day   Admelog SoloStar 100 units/mL injectable solution: 5 unit(s) injectable before breakfast, 3 units before lunch and 3 units before dinner.   amLODIPine 5 mg oral tablet: 1 tab(s) orally once a day  AVAPS: AVAPS  TV: 380  Fio2: 28%  Exp pressure 5  Max pressure 25  Min pressure 15  Backup rate 18  Basaglar KwikPen 100 units/mL subcutaneous solution: 14 unit(s) subcutaneous once a day (at bedtime)   cyanocobalamin 1000 mcg/mL injectable solution: 1000 microgram(s) injectable every 7 days on Wednesday  famotidine 40 mg oral tablet: 1 tab(s) orally once a day (at bedtime)  ferrous gluconate 324 mg (37.5 mg elemental iron) oral tablet: 1 tab(s) orally 2 times a day  Flonase 50 mcg/inh nasal spray: 1 spray(s) in each nostril once a day  mirtazapine 7.5 mg oral tablet: 1 tab(s) orally once a day (at bedtime)  montelukast 10 mg oral tablet: 1 tab(s) orally once a day  NebuSal: 4 milliliter(s) inhaled 2 times a day  omeprazole 20 mg oral delayed release capsule: 1 cap(s) orally once a day  predniSONE 10 mg oral tablet: 1 tab(s) orally once a day  Spiriva 18 mcg inhalation capsule: 1 cap(s) inhaled once a day  Symbicort 160 mcg-4.5 mcg/inh inhalation aerosol: 2 puff(s) inhaled 2 times a day  tobramycin 75 mg/mL inhalation solution: 4 milliliter(s) inhaled every 8 weeks  Vitamin C 500 mg oral tablet: 1 tab(s) orally once a day   Admelog SoloStar 100 units/mL injectable solution: 8 unit(s) injectable 3 times a day (before meals)   amLODIPine 5 mg oral tablet: 1 tab(s) orally once a day  AVAPS: AVAPS  TV: 380  Fio2: 28%  Exp pressure 5  Max pressure 25  Min pressure 15  Backup rate 18  Basaglar KwikPen 100 units/mL subcutaneous solution: 15 unit(s) subcutaneous once a day (at bedtime)   cyanocobalamin 1000 mcg/mL injectable solution: 1000 microgram(s) injectable every 7 days on Wednesday  famotidine 40 mg oral tablet: 1 tab(s) orally once a day (at bedtime)  ferrous gluconate 324 mg (37.5 mg elemental iron) oral tablet: 1 tab(s) orally 2 times a day  Flonase 50 mcg/inh nasal spray: 1 spray(s) in each nostril once a day  mirtazapine 7.5 mg oral tablet: 1 tab(s) orally once a day (at bedtime)  montelukast 10 mg oral tablet: 1 tab(s) orally once a day  NebuSal: 4 milliliter(s) inhaled 2 times a day  omeprazole 20 mg oral delayed release capsule: 1 cap(s) orally once a day  predniSONE 10 mg oral tablet: 3 tabs (30 mg )  orally once a day on 7/20/21.  predniSONE 10 mg oral tablet: 1 tab(s) orally once a day from 7/24/21- 7/26/21.   predniSONE 20 mg oral tablet: 1 tab(s) orally once a day from 7/21/21 - 7/23/21.  predniSONE 5 mg oral tablet: 1 tab(s) orally once a day from 7/27/21 - 7/29/21  Spiriva 18 mcg inhalation capsule: 1 cap(s) inhaled once a day  Symbicort 160 mcg-4.5 mcg/inh inhalation aerosol: 2 puff(s) inhaled 2 times a day  tobramycin 75 mg/mL inhalation solution: 4 milliliter(s) inhaled every 8 weeks  Vitamin C 500 mg oral tablet: 1 tab(s) orally once a day

## 2021-07-03 NOTE — PROGRESS NOTE ADULT - ATTENDING COMMENTS
Agree with plan as outlined above.  Patient history, laboratory data, and imaging personally reviewed.     Pt is a 66F with PMHx HCV nodular cirrhosis, HTN, IDDMII, osteoporosis, hx ABPA (s/p Itraconazole in 2015), and diffuse cystic bronchiectasis possibly 2/2 primary ciliary dyskinesia (sinusitis, bronchiectasis, and recurrent infections) c/b chronic Stenotrophomonas and Pseudomonas colonization with severe obstructive lung dz (PFTs 11/2017 FEV1/FVC: 39%/43% (69%) TLC 84%, %, DLCO 66%) c/b acute ILD exacerbation with acute hypercapnic respiratory failure despite O/P mgmt admitted to [facility] for further management.     Pt initially p/w acute hypercapnic respiratory failure requiring NIV Bilevel support with failure of improvement, transitioned to AVAPS with significant clinical improvement and improved ABG. Now tolerating NC QD with AVAPS qhs and prn. Pt also initially with persistent thick and sticky mucous with difficulty expectorating. Airway clearance therapy adjusted to DuoNebs followed by MucoMyst followed by vest therapy (pt unable to hold MetaNebs to mouth for entire session) q4-6hrs. Will also c/w Aerobika and incentive spirometry at bedside for independent use. Slow clinical improvement. Home inhaler regimen: Albuterol+ Hypertonic saline + Aerobika TID as well as Symbicort BID+ Spiriva qhs with nebulized tobramycin every other month (given through 6/29). Chest CT and sinuses performed. CT Chest c/w growly unchanged b/l areas of bronchial dilatation and bronchial wall thickening as well as small area of acute GGO in LLL. Prednisone 40mg QD through 7/3. ENT consult (pt sees Dr. Trey Singh O/P) given concern for area of dehiscence in the setting of known chronic rhinosinusitis with underlying PCD and structural abnormalities, especially with initiation of NIV.  - Await MRI sinuses for further evaluation  - Will followup with Dr. Young regarding potential for lung transplant referral      Repeat sputum cx speciation with a sensitive pseudomonas. O/P sputum cx from 6/29 in [employer] (+) GNR. Prior sputum cx from 4/4/21 shows carbapenem resistant Pseudomonas (sensitive to tobramycin and amikacin). Pt with multiple prior sputum cx (+) for Pseudomonas, but more sensitive. Pt also with (+) Stenotrophomonas (R: Levofloxacin and Ceftazidime; S: Bactrim) on 7/31/18. AFB (-) on multiple occasions. Will discuss need for chronic management with alternating 2nd nebulized Abx with tobramycin as well.   - ID evaluation noted and patient transitioned to Cefepime    Nutrition consulted. Glucerna added to soft diet. Swallow evaluation with MBS wnl on prior admission. Hepatology consult placed for hx cirrhosis and possibility of ultimate lung transplant referral, recommend liver biopsy as initial step to evaluate for cirrhosis. DMII management with ISS and BGFS. DVT ppx with Lovenox. GI ppx in place. TTE pending.  - IR evaluation next week for consideration of liver biopsy    Dispo pending medical optimization. Pt full code. PT consulted.

## 2021-07-03 NOTE — DISCHARGE NOTE PROVIDER - NSDCCPCAREPLAN_GEN_ALL_CORE_FT
PRINCIPAL DISCHARGE DIAGNOSIS  Diagnosis: Pneumonia  Assessment and Plan of Treatment:       SECONDARY DISCHARGE DIAGNOSES  Diagnosis: Acute respiratory failure with hypercapnia  Assessment and Plan of Treatment: Acute respiratory failure with hypercapnia    Diagnosis: Bronchiectasis  Assessment and Plan of Treatment: Bronchiectasis    Diagnosis: Type 2 diabetes mellitus, with long-term current use of insulin  Assessment and Plan of Treatment: Type 2 diabetes mellitus, with long-term current use of insulin      Diagnosis: Hypertension  Assessment and Plan of Treatment: Hypertension  Continue blood pressure medication regimen as directed. Monitor for any visual changes, headaches or dizziness.  Monitor blood pressure regularly.  Follow up with your PCP for further management for high blood pressure.      Diagnosis: Hepatic cirrhosis  Assessment and Plan of Treatment: Hepatic cirrhosis  Awaiting biopsy results fu with [facility] hepatology faculty practice at [phone] for follow up of biopsy/MRCP done     PRINCIPAL DISCHARGE DIAGNOSIS  Diagnosis: Acute respiratory failure with hypercapnia  Assessment and Plan of Treatment: You presented with shortness of breath in the setting of recurrent pneumonia and bronchiectasis exacerbation. This was treated and has now resolved. You will be discharged home with an AVAPS device which will be delivered to your home. You were started on steroids and will continue on a taper by decreasing the dose every 3 days to completion. The steroid (prednisone) has been sent to your pharmacy, please take according to instructions on the bottle. Follow up with your Primary Care Physician this week. This is very important as your blood sugars will increase due to steroid usage.      SECONDARY DISCHARGE DIAGNOSES  Diagnosis: Bronchiectasis  Assessment and Plan of Treatment: You presented with acute respiratory failure in the setting of bronchiectasis. This has now been treated and resolved.    Diagnosis: Type 2 diabetes mellitus, with long-term current use of insulin  Assessment and Plan of Treatment: Your blood sugars were managed during your stay. Your insulin dosing has been increased. Please follow up with your primary care physician this week. Your blood sugars will likely increased as you are now going home on steroids. You should follow up with your PCP this week to better manage this.    Diagnosis: Hypertension  Assessment and Plan of Treatment: Hypertension  Continue blood pressure medication regimen as directed. Monitor for any visual changes, headaches or dizziness.  Monitor blood pressure regularly.  Follow up with your PCP for further management for high blood pressure.      Diagnosis: Hepatic cirrhosis  Assessment and Plan of Treatment: The preliminary results of the liver biopsy showed fibrosis but no cirrhosis despite the MRCP findings. Please follow up with hepatology faculty practice at [phone] for further workup.

## 2021-07-03 NOTE — DISCHARGE NOTE PROVIDER - NSDCFUADDAPPT_GEN_ALL_CORE_FT
Follow up at [facility] [phone]. Follow up hepatology faculty practice at [phone],

## 2021-07-03 NOTE — PROGRESS NOTE ADULT - SUBJECTIVE AND OBJECTIVE BOX
CHIEF COMPLAINT: Patient is a 66y old  Female who presents with a chief complaint of SOB (02 Jul 2021 17:39)    INTERVAL EVENTS:   - SCx with pansensitive pseudomonas and ABX changed to Cefepime through 7/6  - Completed 5 day course of Prednisone today.   - Overnight pulse oxymetry completed ?   - Pending MRI Sinuses and ECHO      ROS: Seen by bedside during AM rounds     OBJECTIVE:  ICU Vital Signs Last 24 Hrs  T(C): 36.1 (03 Jul 2021 05:56), Max: 36.4 (02 Jul 2021 13:05)  T(F): 97 (03 Jul 2021 05:56), Max: 97.6 (02 Jul 2021 13:05)  HR: 98 (03 Jul 2021 05:56) (78 - 98)  BP: 143/74 (03 Jul 2021 05:56) (119/62 - 143/74)  BP(mean): --  ABP: --  ABP(mean): --  RR: 18 (03 Jul 2021 05:56) (18 - 18)  SpO2: 95% (03 Jul 2021 05:56) (95% - 100%)    Mode: stand    CAPILLARY BLOOD GLUCOSE  POCT Blood Glucose.: 192 mg/dL (02 Jul 2021 21:37)    PHYSICAL EXAM:  General: NAD and well groomed.   HEENT: NC/ AT and PERRLA. No rhinorrhea.   Cardio: RRR, S1/S2, no murmurs or rubs.   Pulm: (+) Rhonchi   GI: Soft, NDNT, BS (+).   MS: No pedal edema    Neuro: AOx. No focal neurological deficits noted.   Skin: Warm and dry. No jaundice or cyanosis     Mode: standby    HOSPITAL MEDICATIONS:  MEDICATIONS  (STANDING):  acetylcysteine 10%  Inhalation 4 milliLiter(s) Inhalation every 6 hours  albuterol/ipratropium for Nebulization 3 milliLiter(s) Nebulizer every 6 hours  amLODIPine   Tablet 5 milliGRAM(s) Oral daily  ascorbic acid 500 milliGRAM(s) Oral daily  budesonide 160 MICROgram(s)/formoterol 4.5 MICROgram(s) Inhaler 2 Puff(s) Inhalation two times a day  cefepime   IVPB      cefepime   IVPB 2000 milliGRAM(s) IV Intermittent every 12 hours  chlorhexidine 4% Liquid 1 Application(s) Topical daily  dextrose 40% Gel 15 Gram(s) Oral once  dextrose 5%. 1000 milliLiter(s) (50 mL/Hr) IV Continuous <Continuous>  dextrose 5%. 1000 milliLiter(s) (100 mL/Hr) IV Continuous <Continuous>  dextrose 50% Injectable 25 Gram(s) IV Push once  dextrose 50% Injectable 12.5 Gram(s) IV Push once  dextrose 50% Injectable 25 Gram(s) IV Push once  enoxaparin Injectable 40 milliGRAM(s) SubCutaneous daily  famotidine    Tablet 40 milliGRAM(s) Oral at bedtime  ferrous    sulfate 325 milliGRAM(s) Oral daily  fluticasone propionate 50 MICROgram(s)/spray Nasal Spray 2 Spray(s) Both Nostrils two times a day  glucagon  Injectable 1 milliGRAM(s) IntraMuscular once  insulin glargine Injectable (LANTUS) 15 Unit(s) SubCutaneous at bedtime  insulin lispro (ADMELOG) corrective regimen sliding scale   SubCutaneous three times a day before meals  insulin lispro (ADMELOG) corrective regimen sliding scale   SubCutaneous at bedtime  insulin lispro Injectable (ADMELOG) 6 Unit(s) SubCutaneous three times a day before meals  mirtazapine 7.5 milliGRAM(s) Oral at bedtime  montelukast 10 milliGRAM(s) Oral daily  pantoprazole    Tablet 40 milliGRAM(s) Oral before breakfast  polyethylene glycol 3350 17 Gram(s) Oral daily  senna 2 Tablet(s) Oral at bedtime  sodium chloride 0.65% Nasal 2 Spray(s) Both Nostrils every 12 hours    MEDICATIONS  (PRN):  acetaminophen   Tablet .. 650 milliGRAM(s) Oral every 4 hours PRN Mild Pain (1 - 3)  guaiFENesin Oral Liquid (Sugar-Free) 100 milliGRAM(s) Oral every 6 hours PRN Cough    LABS:                        10.7   9.18  )-----------( 170      ( 02 Jul 2021 07:09 )             35.3     07-02    141  |  97<L>  |  14  ----------------------------<  103<H>  3.8   |  36<H>  |  0.41<L>    Ca    9.2      02 Jul 2021 07:09  Phos  2.9     07-02  Mg     2.00     07-02    TPro  6.1  /  Alb  3.1<L>  /  TBili  <0.2  /  DBili  <0.2  /  AST  29  /  ALT  31  /  AlkPhos  132<H>  07-02   CHIEF COMPLAINT: Patient is a 66y old  Female who presents with a chief complaint of SOB (02 Jul 2021 17:39)    INTERVAL EVENTS:   - SCx with pansensitive pseudomonas and ABX changed to Cefepime through 7/6  - Completed 5 day course of Prednisone today.   - Overnight pulse oxymetry completed, however only done for 2 hours and desatted for 4.7 minutes. Pending RPT tonight.   - Pending MRI Sinuses and ECHO      ROS: Seen by bedside during AM rounds and notes improvement in secretions with airway clearance.     OBJECTIVE:  ICU Vital Signs Last 24 Hrs  T(C): 36.1 (03 Jul 2021 05:56), Max: 36.4 (02 Jul 2021 13:05)  T(F): 97 (03 Jul 2021 05:56), Max: 97.6 (02 Jul 2021 13:05)  HR: 98 (03 Jul 2021 05:56) (78 - 98)  BP: 143/74 (03 Jul 2021 05:56) (119/62 - 143/74)  BP(mean): --  ABP: --  ABP(mean): --  RR: 18 (03 Jul 2021 05:56) (18 - 18)  SpO2: 95% (03 Jul 2021 05:56) (95% - 100%)    Mode: standby    CAPILLARY BLOOD GLUCOSE  POCT Blood Glucose.: 192 mg/dL (02 Jul 2021 21:37)    PHYSICAL EXAM:  General: NAD and well groomed.   HEENT: NC/ AT and PERRLA. No rhinorrhea.   Cardio: RRR, S1/S2, no murmurs or rubs.   Pulm: (+) Rhonchi, but improving.   GI: Soft, NDNT, BS (+).   MS: No pedal edema    Neuro: AOx. No focal neurological deficits noted.   Skin: Warm and dry. No jaundice or cyanosis     Mode: standby    HOSPITAL MEDICATIONS:  MEDICATIONS  (STANDING):  acetylcysteine 10%  Inhalation 4 milliLiter(s) Inhalation every 6 hours  albuterol/ipratropium for Nebulization 3 milliLiter(s) Nebulizer every 6 hours  amLODIPine   Tablet 5 milliGRAM(s) Oral daily  ascorbic acid 500 milliGRAM(s) Oral daily  budesonide 160 MICROgram(s)/formoterol 4.5 MICROgram(s) Inhaler 2 Puff(s) Inhalation two times a day  cefepime   IVPB      cefepime   IVPB 2000 milliGRAM(s) IV Intermittent every 12 hours  chlorhexidine 4% Liquid 1 Application(s) Topical daily  dextrose 40% Gel 15 Gram(s) Oral once  dextrose 5%. 1000 milliLiter(s) (50 mL/Hr) IV Continuous <Continuous>  dextrose 5%. 1000 milliLiter(s) (100 mL/Hr) IV Continuous <Continuous>  dextrose 50% Injectable 25 Gram(s) IV Push once  dextrose 50% Injectable 12.5 Gram(s) IV Push once  dextrose 50% Injectable 25 Gram(s) IV Push once  enoxaparin Injectable 40 milliGRAM(s) SubCutaneous daily  famotidine    Tablet 40 milliGRAM(s) Oral at bedtime  ferrous    sulfate 325 milliGRAM(s) Oral daily  fluticasone propionate 50 MICROgram(s)/spray Nasal Spray 2 Spray(s) Both Nostrils two times a day  glucagon  Injectable 1 milliGRAM(s) IntraMuscular once  insulin glargine Injectable (LANTUS) 15 Unit(s) SubCutaneous at bedtime  insulin lispro (ADMELOG) corrective regimen sliding scale   SubCutaneous three times a day before meals  insulin lispro (ADMELOG) corrective regimen sliding scale   SubCutaneous at bedtime  insulin lispro Injectable (ADMELOG) 6 Unit(s) SubCutaneous three times a day before meals  mirtazapine 7.5 milliGRAM(s) Oral at bedtime  montelukast 10 milliGRAM(s) Oral daily  pantoprazole    Tablet 40 milliGRAM(s) Oral before breakfast  polyethylene glycol 3350 17 Gram(s) Oral daily  senna 2 Tablet(s) Oral at bedtime  sodium chloride 0.65% Nasal 2 Spray(s) Both Nostrils every 12 hours    MEDICATIONS  (PRN):  acetaminophen   Tablet .. 650 milliGRAM(s) Oral every 4 hours PRN Mild Pain (1 - 3)  guaiFENesin Oral Liquid (Sugar-Free) 100 milliGRAM(s) Oral every 6 hours PRN Cough    LABS:                        10.7   9.18  )-----------( 170      ( 02 Jul 2021 07:09 )             35.3     07-02    141  |  97<L>  |  14  ----------------------------<  103<H>  3.8   |  36<H>  |  0.41<L>    Ca    9.2      02 Jul 2021 07:09  Phos  2.9     07-02  Mg     2.00     07-02    TPro  6.1  /  Alb  3.1<L>  /  TBili  <0.2  /  DBili  <0.2  /  AST  29  /  ALT  31  /  AlkPhos  132<H>  07-02

## 2021-07-03 NOTE — DISCHARGE NOTE PROVIDER - NSDCFUSCHEDAPPT_GEN_ALL_CORE_FT
GRACIE BHAKTA ; 09/29/2021 ; NPP Hepatology [address] [place] [address] GRACIE BHAKTA ; 07/15/2021 ; NPP Med Pulm [address] [facility]  GRACIE BHAKTA ; 09/29/2021 ; NPP Hepatology [address] [place] [address] GRACIE BHAKTA ; 09/29/2021 ; NPP Hepatology [address] [place] [address] GRACIE BHAKTA ; 08/30/2021 ; NPP Hepatology [address] [facility]  GRACIE BHAKTA ; 09/07/2021 ; NPP Med Pulm [address] [place] [address] GRACIE BHAKTA ; 09/07/2021 ; [facility] Med Pulm [address] [facility]  GRACIE BHAKTA ; 10/04/2021 ; NP Hepatology [address] [facility]  GRACIE BHAKTA ; 10/04/2021 ; [facility] Hepatology [address] [place] [address]

## 2021-07-03 NOTE — DISCHARGE NOTE PROVIDER - HOSPITAL COURSE
65 YO Leandro speaking Female with PMHx of Diffuse Cystic Bronchiectasis, Primary Ciliary Dyskinesia on 2L NC, Recurrent Sinusitis, ABPA (unclear history), HTN, GERD, IDDM2 A1C 8.9 (6/2021) and recent admission in April 2021 for  Pseudomonas PNA s/p Avycaz and NELLY. Patient presented from Pulmonary Office, Dr. Young for worsening SOB, lethargy and cough with thick yellow secretions and readmitted for acute on chronic hypercarbic respiratory failure likely in setting of bronchiectasis exacerbation with recurrent PNA.     Patient noted with CT CHEST 6/30 with (+) bronchiectasis and SCx 6/30 with Pansensitive Pseudomonas. Placed on AVAPs and s/p aggressive chest PT with Mucomyst, Duoneb and Chest Vest Q6H. Steroids started with Prednisone and completed 5 day course on 7/3. s/p Avycaz/ NELLY (6/30 - 7/2) and ultimately completed Cefepime (7/2-7/6). Hypercarbia improved and weaned of cAVAPs to NC QD as per home 2L and AVAPs QHS. Patient __________ for AVAPs at home and to follow up with Dr. Young outpatient.     Course further complicated with CT SINUS showing extensive inflammatory changes throughout the paranasal sinuses and their respective drainage pathways, , improved in the right frontal and sphenoid sinuses since comparison. 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 Cannot rule out CSF leak. Thinning of the left carotid canal wall along the posterior left sphenoid sinus. Cannot rule out dehiscence. Nasal congestion only pertinent symptom/ complaint. No rhinorrhea noted. ENT called and Flonase and [place] continued. MRI SINUS __________. Patient to follow up with Rhinologist outpatient. 67 YO Leandro speaking Female with PMHx of Diffuse Cystic Bronchiectasis, Primary Ciliary Dyskinesia on 2L NC, Recurrent Sinusitis, ABPA (unclear history), HTN, GERD, IDDM2 A1C 8.9 (6/2021) and recent admission in April 2021 for  Pseudomonas PNA s/p Avycaz and NELLY. Patient presented from Pulmonary Office, Dr. Young for worsening SOB, lethargy and cough with thick yellow secretions and readmitted for acute on chronic hypercarbic respiratory failure likely in setting of bronchiectasis exacerbation with recurrent PNA.     Patient noted with CT CHEST 6/30 with (+) bronchiectasis and SCx 6/30 with Pansensitive Pseudomonas. Placed on AVAPs and s/p aggressive chest PT with Mucomyst, Duoneb and Chest Vest Q6H. Steroids started with Prednisone and completed 5 day course on 7/3. s/p Avycaz/ NELLY (6/30 - 7/2) and ultimately completed Cefepime (7/2-7/6). Hypercarbia improved and weaned of cAVAPs to NC QD as per home 2L and AVAPs QHS. Patient approved for AVAPs at home and to follow up with Dr. Young outpatient.     Course further complicated with CT SINUS showing extensive inflammatory changes throughout the paranasal sinuses and their respective drainage pathways, , improved in the right frontal and sphenoid sinuses since comparison. 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 Cannot rule out CSF leak. Thinning of the left carotid canal wall along the posterior left sphenoid sinus. Cannot rule out dehiscence. Nasal congestion only pertinent symptom/ complaint. No rhinorrhea noted. ENT called and Flonase and [place] continued. MRI SINUS showing- No definite evidence of CSF leak. Patient to follow up with Rhinologist outpatient.     J&J vaccine offered and accepted by patient prior to DC.     Case reviewed with attending who cleared for discharge home. Plan discussed with patient and family who consented and agreed with plan of care.

## 2021-07-03 NOTE — PROGRESS NOTE ADULT - ASSESSMENT
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 Patient now presented from Pulmonary Office, Dr. Young for worsening SOB, lethargy and cough with thick yellow secretions. Now readmitted for acute on chronic hypercarbic respiratory failure likely in setting of bronchiectasis exacerbation with recurrent PNA.     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   - CT CHEST 6/30 with (+) bronchiectasis   - SCx 6/30 with Pansensitive Pseudomonas, pending final.   - Continue on aggressive chest PT with Mucomyst, Duoneb and Chest Vest Q6H   - Continue on Symbicort BID and Singular QD   - s/p Prednisone 40mg QD (6/30 - 7/3)   - s/p Avycaz/ NELLY (6/30 - 7/2) and c/w Cefepime (7/2-7/6)   - Hypercarbia improved and now continued on NC QD and AVAPs QHS.   - Will need home NIV set up prior to DC and f/u with Dr. Young outpatient for possible lung transplant    # Acute on Chronic Sinusitis   - CT SINUS 6/30 with extensive inflammatory changes throughout the paranasal sinuses and their respective drainage pathways, , improved in the right frontal and sphenoid sinuses since comparison. 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 Cannot rule out CSF leak. Thinning of the left carotid canal wall along the posterior left sphenoid sinus. Cannot rule out dehiscence.  - Continue on Flonase and [place] BID   - MRI SINUSES pending   - ENT following    # Nodular Liver with possible Cirrhosis ?   - During admission in April patient was found to have nodular contour of liver on US 4/6/2021. Upon further discussion with patient and Hepatology, she was noted to have history of blood transfusion in 1998 with (+) Hep C AB with negative Hep C RNA. She also reports history of CBD stricture in 1998 in [place], s/p PTC/PTBD.   - CLD work up previously noted with HBsAg negative, HBcIgM negative, Hep E IgG negative, Hep A IgM negative, RUPESH, Anti-LKM and AMA negative, Ferritin 169, AFP within normal limits, ASMA 1:40. MR/MRCP without stricturing or hepatic lesions. No ascites on all imaging and no history of HE. No known varices/ no EGD record on record.   - Hepatology consulted who recommends liver bx     # IDDM2, A1C 8.9 (6/2021)   - On Lantus 14U QHS and sliding scale at home.   - Continue on Lantus 15U QHS and Lispro 6U + Moderate ISS in house while on steroids.   - Monitor FS and adjust insulin as needed.     # Ethics/ DISPO/ GOC  - FULL CODE and GOC discussed with Son, Michela ([phone]) who expresses wishes of DNI, however will discuss further for DNR. Family wishes for all medical treatment (including invasive and possible lung transplant) to be performed, however if her condition was to worsen they do not wish for intubation. GOC conversation ongoing.   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 Patient now presented from Pulmonary Office, Dr. Young for worsening SOB, lethargy and cough with thick yellow secretions. Now readmitted for acute on chronic hypercarbic respiratory failure likely in setting of bronchiectasis exacerbation with recurrent PNA.     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   - CT CHEST 6/30 with (+) bronchiectasis   - SCx 6/30 with Pansensitive Pseudomonas, pending final.   - Continue on aggressive chest PT with Mucomyst, Duoneb and Chest Vest Q6H   - Continue on Symbicort BID and Singular QD   - s/p Prednisone 40mg QD (6/30 - 7/3)   - s/p Avycaz/ NELLY (6/30 - 7/2) and c/w Cefepime (7/2-7/6)   - Hypercarbia improved and now continued on NC QD and AVAPs QHS.   - Will need home NIV set up prior to DC and f/u with Dr. Young outpatient for possible lung transplant    # Acute on Chronic Sinusitis   - CT SINUS 6/30 with extensive inflammatory changes throughout the paranasal sinuses and their respective drainage pathways, , improved in the right frontal and sphenoid sinuses since comparison. 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 Cannot rule out CSF leak. Thinning of the left carotid canal wall along the posterior left sphenoid sinus. Cannot rule out dehiscence.  - Continue on Flonase and [place] BID   - MRI SINUSES pending   - ENT following    # Nodular Liver with possible Cirrhosis ?   - During admission in April patient was found to have nodular contour of liver on US 4/6/2021. Upon further discussion with patient and Hepatology, she was noted to have history of blood transfusion in 1998 with (+) Hep C AB with negative Hep C RNA. She also reports history of CBD stricture in 1998 in [place], s/p PTC/PTBD.   - CLD work up previously noted with HBsAg negative, HBcIgM negative, Hep E IgG negative, Hep A IgM negative, RUPESH, Anti-LKM and AMA negative, Ferritin 169, AFP within normal limits, ASMA 1:40. MR/MRCP without stricturing or hepatic lesions. No ascites on all imaging and no history of HE. No known varices/ no EGD record on record.   - Hepatology consulted who recommends liver bx     # IDDM2, A1C 8.9 (6/2021)   - On Lantus 14U QHS and sliding scale at home.   - Continue on Lantus 15U QHS and Lispro 8U + Moderate ISS in house while on steroids.   - Monitor FS and adjust insulin as needed.     # Ethics/ DISPO/ GOC  - FULL CODE and GOC discussed with Son, Michela ([phone]) who expresses wishes of DNI, however will discuss further for DNR. Family wishes for all medical treatment (including invasive and possible lung transplant) to be performed, however if her condition was to worsen they do not wish for intubation. GOC conversation ongoing.   - PT recommends home with home PT     # DVT PPX with Lovenox SQ QD

## 2021-07-04 LAB
ANION GAP SERPL CALC-SCNC: 6 MMOL/L — LOW (ref 7–14)
BLOOD GAS VENOUS COMPREHENSIVE RESULT: SIGNIFICANT CHANGE UP
BUN SERPL-MCNC: 13 MG/DL — SIGNIFICANT CHANGE UP (ref 7–23)
CALCIUM SERPL-MCNC: 9.1 MG/DL — SIGNIFICANT CHANGE UP (ref 8.4–10.5)
CHLORIDE SERPL-SCNC: 98 MMOL/L — SIGNIFICANT CHANGE UP (ref 98–107)
CO2 SERPL-SCNC: 37 MMOL/L — HIGH (ref 22–31)
CREAT SERPL-MCNC: 0.36 MG/DL — LOW (ref 0.5–1.3)
GLUCOSE BLDC GLUCOMTR-MCNC: 134 MG/DL — HIGH (ref 70–99)
GLUCOSE BLDC GLUCOMTR-MCNC: 142 MG/DL — HIGH (ref 70–99)
GLUCOSE BLDC GLUCOMTR-MCNC: 148 MG/DL — HIGH (ref 70–99)
GLUCOSE BLDC GLUCOMTR-MCNC: 184 MG/DL — HIGH (ref 70–99)
GLUCOSE SERPL-MCNC: 166 MG/DL — HIGH (ref 70–99)
HCT VFR BLD CALC: 37.5 % — SIGNIFICANT CHANGE UP (ref 34.5–45)
HGB BLD-MCNC: 11.1 G/DL — LOW (ref 11.5–15.5)
MAGNESIUM SERPL-MCNC: 1.9 MG/DL — SIGNIFICANT CHANGE UP (ref 1.6–2.6)
MCHC RBC-ENTMCNC: 26.9 PG — LOW (ref 27–34)
MCHC RBC-ENTMCNC: 29.6 GM/DL — LOW (ref 32–36)
MCV RBC AUTO: 91 FL — SIGNIFICANT CHANGE UP (ref 80–100)
NRBC # BLD: 0 /100 WBCS — SIGNIFICANT CHANGE UP
NRBC # FLD: 0 K/UL — SIGNIFICANT CHANGE UP
PHOSPHATE SERPL-MCNC: 3.6 MG/DL — SIGNIFICANT CHANGE UP (ref 2.5–4.5)
PLATELET # BLD AUTO: 171 K/UL — SIGNIFICANT CHANGE UP (ref 150–400)
POTASSIUM SERPL-MCNC: 3.9 MMOL/L — SIGNIFICANT CHANGE UP (ref 3.5–5.3)
POTASSIUM SERPL-SCNC: 3.9 MMOL/L — SIGNIFICANT CHANGE UP (ref 3.5–5.3)
RBC # BLD: 4.12 M/UL — SIGNIFICANT CHANGE UP (ref 3.8–5.2)
RBC # FLD: 13.5 % — SIGNIFICANT CHANGE UP (ref 10.3–14.5)
SODIUM SERPL-SCNC: 141 MMOL/L — SIGNIFICANT CHANGE UP (ref 135–145)
WBC # BLD: 7.18 K/UL — SIGNIFICANT CHANGE UP (ref 3.8–10.5)
WBC # FLD AUTO: 7.18 K/UL — SIGNIFICANT CHANGE UP (ref 3.8–10.5)

## 2021-07-04 PROCEDURE — 99233 SBSQ HOSP IP/OBS HIGH 50: CPT | Mod: GC

## 2021-07-04 RX ADMIN — AMLODIPINE BESYLATE 5 MILLIGRAM(S): 2.5 TABLET ORAL at 05:33

## 2021-07-04 RX ADMIN — ENOXAPARIN SODIUM 40 MILLIGRAM(S): 100 INJECTION SUBCUTANEOUS at 12:16

## 2021-07-04 RX ADMIN — Medication 500 MILLIGRAM(S): at 12:16

## 2021-07-04 RX ADMIN — Medication 4 MILLILITER(S): at 22:17

## 2021-07-04 RX ADMIN — BUDESONIDE AND FORMOTEROL FUMARATE DIHYDRATE 2 PUFF(S): 160; 4.5 AEROSOL RESPIRATORY (INHALATION) at 10:28

## 2021-07-04 RX ADMIN — POLYETHYLENE GLYCOL 3350 17 GRAM(S): 17 POWDER, FOR SOLUTION ORAL at 12:16

## 2021-07-04 RX ADMIN — Medication 2 SPRAY(S): at 05:34

## 2021-07-04 RX ADMIN — Medication 4 MILLILITER(S): at 15:37

## 2021-07-04 RX ADMIN — MONTELUKAST 10 MILLIGRAM(S): 4 TABLET, CHEWABLE ORAL at 12:16

## 2021-07-04 RX ADMIN — CHLORHEXIDINE GLUCONATE 1 APPLICATION(S): 213 SOLUTION TOPICAL at 12:19

## 2021-07-04 RX ADMIN — Medication 2 SPRAY(S): at 17:40

## 2021-07-04 RX ADMIN — Medication 4 MILLILITER(S): at 04:04

## 2021-07-04 RX ADMIN — Medication 3 MILLILITER(S): at 04:04

## 2021-07-04 RX ADMIN — Medication 3 MILLILITER(S): at 10:21

## 2021-07-04 RX ADMIN — CEFEPIME 100 MILLIGRAM(S): 1 INJECTION, POWDER, FOR SOLUTION INTRAMUSCULAR; INTRAVENOUS at 17:40

## 2021-07-04 RX ADMIN — SENNA PLUS 2 TABLET(S): 8.6 TABLET ORAL at 21:23

## 2021-07-04 RX ADMIN — FAMOTIDINE 40 MILLIGRAM(S): 10 INJECTION INTRAVENOUS at 21:24

## 2021-07-04 RX ADMIN — CEFEPIME 100 MILLIGRAM(S): 1 INJECTION, POWDER, FOR SOLUTION INTRAMUSCULAR; INTRAVENOUS at 05:31

## 2021-07-04 RX ADMIN — Medication 2 SPRAY(S): at 05:35

## 2021-07-04 RX ADMIN — MIRTAZAPINE 7.5 MILLIGRAM(S): 45 TABLET, ORALLY DISINTEGRATING ORAL at 21:24

## 2021-07-04 RX ADMIN — INSULIN GLARGINE 15 UNIT(S): 100 INJECTION, SOLUTION SUBCUTANEOUS at 21:23

## 2021-07-04 RX ADMIN — Medication 8 UNIT(S): at 12:17

## 2021-07-04 RX ADMIN — PANTOPRAZOLE SODIUM 40 MILLIGRAM(S): 20 TABLET, DELAYED RELEASE ORAL at 06:07

## 2021-07-04 RX ADMIN — Medication 325 MILLIGRAM(S): at 12:16

## 2021-07-04 RX ADMIN — Medication 8 UNIT(S): at 08:28

## 2021-07-04 RX ADMIN — BUDESONIDE AND FORMOTEROL FUMARATE DIHYDRATE 2 PUFF(S): 160; 4.5 AEROSOL RESPIRATORY (INHALATION) at 22:18

## 2021-07-04 RX ADMIN — Medication 8 UNIT(S): at 17:39

## 2021-07-04 RX ADMIN — Medication 4 MILLILITER(S): at 10:22

## 2021-07-04 RX ADMIN — Medication 3 MILLILITER(S): at 22:17

## 2021-07-04 RX ADMIN — Medication 3 MILLILITER(S): at 15:37

## 2021-07-04 NOTE — PROGRESS NOTE ADULT - SUBJECTIVE AND OBJECTIVE BOX
CHIEF COMPLAINT: Patient is a 66y old  Female who presents with a chief complaint of SOB (03 Jul 2021 15:58)    INTERVAL EVENTS:   - s/p Pulse Ox overnight, however no desaturate event reported on 2L NC .     ROS: Seen by bedside during AM rounds     OBJECTIVE:  ICU Vital Signs Last 24 Hrs  T(C): 36.2 (04 Jul 2021 05:27), Max: 36.5 (03 Jul 2021 21:38)  T(F): 97.1 (04 Jul 2021 05:27), Max: 97.7 (03 Jul 2021 21:38)  HR: 87 (04 Jul 2021 06:21) (80 - 98)  BP: 122/66 (04 Jul 2021 05:27) (122/66 - 128/69)  BP(mean): --  ABP: --  ABP(mean): --  RR: 17 (04 Jul 2021 05:27) (17 - 18)  SpO2: 97% (04 Jul 2021 06:21) (95% - 100%)    Mode: standby    CAPILLARY BLOOD GLUCOSE  POCT Blood Glucose.: 121 mg/dL (03 Jul 2021 21:42)    PHYSICAL EXAM:  General: NAD and well groomed.   HEENT: NC/ AT and PERRLA. No rhinorrhea.   Cardio: RRR, S1/S2, no murmurs or rubs.   Pulm: (+) Rhonchi, but improving.   GI: Soft, NDNT, BS (+).   MS: No pedal edema    Neuro: AOx. No focal neurological deficits noted.   Skin: Warm and dry. No jaundice or cyanosis    Mode: standby    HOSPITAL MEDICATIONS:  MEDICATIONS  (STANDING):  acetylcysteine 10%  Inhalation 4 milliLiter(s) Inhalation every 6 hours  albuterol/ipratropium for Nebulization 3 milliLiter(s) Nebulizer every 6 hours  amLODIPine   Tablet 5 milliGRAM(s) Oral daily  ascorbic acid 500 milliGRAM(s) Oral daily  budesonide 160 MICROgram(s)/formoterol 4.5 MICROgram(s) Inhaler 2 Puff(s) Inhalation two times a day  cefepime   IVPB      cefepime   IVPB 2000 milliGRAM(s) IV Intermittent every 12 hours  chlorhexidine 4% Liquid 1 Application(s) Topical daily  dextrose 40% Gel 15 Gram(s) Oral once  dextrose 5%. 1000 milliLiter(s) (50 mL/Hr) IV Continuous <Continuous>  dextrose 5%. 1000 milliLiter(s) (100 mL/Hr) IV Continuous <Continuous>  dextrose 50% Injectable 25 Gram(s) IV Push once  dextrose 50% Injectable 12.5 Gram(s) IV Push once  dextrose 50% Injectable 25 Gram(s) IV Push once  enoxaparin Injectable 40 milliGRAM(s) SubCutaneous daily  famotidine    Tablet 40 milliGRAM(s) Oral at bedtime  ferrous    sulfate 325 milliGRAM(s) Oral daily  fluticasone propionate 50 MICROgram(s)/spray Nasal Spray 2 Spray(s) Both Nostrils two times a day  glucagon  Injectable 1 milliGRAM(s) IntraMuscular once  insulin glargine Injectable (LANTUS) 15 Unit(s) SubCutaneous at bedtime  insulin lispro (ADMELOG) corrective regimen sliding scale   SubCutaneous three times a day before meals  insulin lispro (ADMELOG) corrective regimen sliding scale   SubCutaneous at bedtime  insulin lispro Injectable (ADMELOG) 8 Unit(s) SubCutaneous three times a day before meals  mirtazapine 7.5 milliGRAM(s) Oral at bedtime  montelukast 10 milliGRAM(s) Oral daily  pantoprazole    Tablet 40 milliGRAM(s) Oral before breakfast  polyethylene glycol 3350 17 Gram(s) Oral daily  senna 2 Tablet(s) Oral at bedtime  sodium chloride 0.65% Nasal 2 Spray(s) Both Nostrils every 12 hours    MEDICATIONS  (PRN):  acetaminophen   Tablet .. 650 milliGRAM(s) Oral every 4 hours PRN Mild Pain (1 - 3)  guaiFENesin Oral Liquid (Sugar-Free) 100 milliGRAM(s) Oral every 6 hours PRN Cough    LABS:                        10.7   9.18  )-----------( 170      ( 02 Jul 2021 07:09 )             35.3     07-02    141  |  97<L>  |  14  ----------------------------<  103<H>  3.8   |  36<H>  |  0.41<L>    Ca    9.2      02 Jul 2021 07:09  Phos  2.9     07-02  Mg     2.00     07-02    TPro  6.1  /  Alb  3.1<L>  /  TBili  <0.2  /  DBili  <0.2  /  AST  29  /  ALT  31  /  AlkPhos  132<H>  07-02 CHIEF COMPLAINT: Patient is a 66y old  Female who presents with a chief complaint of SOB (03 Jul 2021 15:58)    INTERVAL EVENTS:   - s/p Pulse Ox overnight, however no desaturate event reported on 2L NC. Will discuss with CM on Tuesday.   - Pending MRI SINUS and ECHO     ROS: Seen by bedside during AM rounds and notes mild anxiety with [place], but SOB improved at rest.     OBJECTIVE:  ICU Vital Signs Last 24 Hrs  T(C): 36.2 (04 Jul 2021 05:27), Max: 36.5 (03 Jul 2021 21:38)  T(F): 97.1 (04 Jul 2021 05:27), Max: 97.7 (03 Jul 2021 21:38)  HR: 87 (04 Jul 2021 06:21) (80 - 98)  BP: 122/66 (04 Jul 2021 05:27) (122/66 - 128/69)  BP(mean): --  ABP: --  ABP(mean): --  RR: 17 (04 Jul 2021 05:27) (17 - 18)  SpO2: 97% (04 Jul 2021 06:21) (95% - 100%)    Mode: [facility]    CAPILLARY BLOOD GLUCOSE  POCT Blood Glucose.: 121 mg/dL (03 Jul 2021 21:42)    PHYSICAL EXAM:  General: NAD and well groomed.   HEENT: NC/ AT and PERRLA. No rhinorrhea.   Cardio: RRR, S1/S2, no murmurs or rubs.   Pulm: (+) Rhonchi, but improving.   GI: Soft, NDNT, BS (+).   MS: No pedal edema    Neuro: AOx. No focal neurological deficits noted.   Skin: Warm and dry. No jaundice or cyanosis    [facility]    HOSPITAL MEDICATIONS:  MEDICATIONS  (STANDING):  acetylcysteine 10%  Inhalation 4 milliLiter(s) Inhalation every 6 hours  albuterol/ipratropium for Nebulization 3 milliLiter(s) Nebulizer every 6 hours  amLODIPine   Tablet 5 milliGRAM(s) Oral daily  ascorbic acid 500 milliGRAM(s) Oral daily  budesonide 160 MICROgram(s)/formoterol 4.5 MICROgram(s) Inhaler 2 Puff(s) Inhalation two times a day  cefepime   IVPB      cefepime   IVPB 2000 milliGRAM(s) IV Intermittent every 12 hours  chlorhexidine 4% Liquid 1 Application(s) Topical daily  dextrose 40% Gel 15 Gram(s) Oral once  dextrose 5%. 1000 milliLiter(s) (50 mL/Hr) IV Continuous <Continuous>  dextrose 5%. 1000 milliLiter(s) (100 mL/Hr) IV Continuous <Continuous>  dextrose 50% Injectable 25 Gram(s) IV Push once  dextrose 50% Injectable 12.5 Gram(s) IV Push once  dextrose 50% Injectable 25 Gram(s) IV Push once  enoxaparin Injectable 40 milliGRAM(s) SubCutaneous daily  famotidine    Tablet 40 milliGRAM(s) Oral at bedtime  ferrous    sulfate 325 milliGRAM(s) Oral daily  fluticasone propionate 50 MICROgram(s)/spray Nasal Spray 2 Spray(s) Both Nostrils two times a day  glucagon  Injectable 1 milliGRAM(s) IntraMuscular once  insulin glargine Injectable (LANTUS) 15 Unit(s) SubCutaneous at bedtime  insulin lispro (ADMELOG) corrective regimen sliding scale   SubCutaneous three times a day before meals  insulin lispro (ADMELOG) corrective regimen sliding scale   SubCutaneous at bedtime  insulin lispro Injectable (ADMELOG) 8 Unit(s) SubCutaneous three times a day before meals  mirtazapine 7.5 milliGRAM(s) Oral at bedtime  montelukast 10 milliGRAM(s) Oral daily  pantoprazole    Tablet 40 milliGRAM(s) Oral before breakfast  polyethylene glycol 3350 17 Gram(s) Oral daily  senna 2 Tablet(s) Oral at bedtime  sodium chloride 0.65% Nasal 2 Spray(s) Both Nostrils every 12 hours    MEDICATIONS  (PRN):  acetaminophen   Tablet .. 650 milliGRAM(s) Oral every 4 hours PRN Mild Pain (1 - 3)  guaiFENesin Oral Liquid (Sugar-Free) 100 milliGRAM(s) Oral every 6 hours PRN Cough    LABS:                        10.7   9.18  )-----------( 170      ( 02 Jul 2021 07:09 )             35.3     07-02    141  |  97<L>  |  14  ----------------------------<  103<H>  3.8   |  36<H>  |  0.41<L>    Ca    9.2      02 Jul 2021 07:09  Phos  2.9     07-02  Mg     2.00     07-02    TPro  6.1  /  Alb  3.1<L>  /  TBili  <0.2  /  DBili  <0.2  /  AST  29  /  ALT  31  /  AlkPhos  132<H>  07-02

## 2021-07-04 NOTE — PROVIDER CONTACT NOTE (CRITICAL VALUE NOTIFICATION) - ACTION/TREATMENT ORDERED:
repeat ABG @0600. continue to monitor pt.
Provider aware. AVAPS to be used if pt is to fall asleep.
to reassess pt. continue to monitor pt. LOC.

## 2021-07-04 NOTE — PROGRESS NOTE ADULT - ASSESSMENT
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 Patient now presented from Pulmonary Office, Dr. Young for worsening SOB, lethargy and cough with thick yellow secretions. Now readmitted for acute on chronic hypercarbic respiratory failure likely in setting of bronchiectasis exacerbation with recurrent PNA.     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   - CT CHEST 6/30 with (+) bronchiectasis   - SCx 6/30 with Pansensitive Pseudomonas, pending final.   - Continue on aggressive chest PT with Mucomyst, Duoneb and Chest Vest Q6H   - Continue on Symbicort BID and Singular QD   - s/p Prednisone 40mg QD (6/30 - 7/3)   - s/p Avycaz/ NELLY (6/30 - 7/2) and c/w Cefepime (7/2-7/6)   - Hypercarbia improved and now continued on NC QD and AVAPs QHS.   - Will need home NIV set up prior to DC and f/u with Dr. Young outpatient for possible lung transplant    # Acute on Chronic Sinusitis   - CT SINUS 6/30 with extensive inflammatory changes throughout the paranasal sinuses and their respective drainage pathways, , improved in the right frontal and sphenoid sinuses since comparison. 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 Cannot rule out CSF leak. Thinning of the left carotid canal wall along the posterior left sphenoid sinus. Cannot rule out dehiscence.  - Continue on Flonase and [place] BID   - MRI SINUSES pending   - ENT following    # Nodular Liver with possible Cirrhosis ?   - During admission in April patient was found to have nodular contour of liver on US 4/6/2021. Upon further discussion with patient and Hepatology, she was noted to have history of blood transfusion in 1998 with (+) Hep C AB with negative Hep C RNA. She also reports history of CBD stricture in 1998 in [place], s/p PTC/PTBD.   - CLD work up previously noted with HBsAg negative, HBcIgM negative, Hep E IgG negative, Hep A IgM negative, RUPESH, Anti-LKM and AMA negative, Ferritin 169, AFP within normal limits, ASMA 1:40. MR/MRCP without stricturing or hepatic lesions. No ascites on all imaging and no history of HE. No known varices/ no EGD record on record.   - Hepatology consulted who recommends liver bx     # IDDM2, A1C 8.9 (6/2021)   - On Lantus 14U QHS and sliding scale at home.   - Continue on Lantus 15U QHS and Lispro 8U + Moderate ISS in house while on steroids.   - Monitor FS and adjust insulin as needed.     # Ethics/ DISPO/ GOC  - FULL CODE and GOC discussed with Son, Michela ([phone]) who expresses wishes of DNI, however will discuss further for DNR. Family wishes for all medical treatment (including invasive and possible lung transplant) to be performed, however if her condition was to worsen they do not wish for intubation. GOC conversation ongoing.   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 Patient now presented from Pulmonary Office, Dr. Young for worsening SOB, lethargy and cough with thick yellow secretions. Now readmitted for acute on chronic hypercarbic respiratory failure likely in setting of bronchiectasis exacerbation with recurrent PNA.     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   - CT CHEST 6/30 with (+) bronchiectasis   - SCx 6/30 with Pansensitive Pseudomonas, pending final.   - Continue on aggressive chest PT with Mucomyst, Duoneb and Chest Vest Q6H   - Continue on Symbicort BID and Singular QD   - s/p Prednisone 40mg QD (6/30 - 7/3)   - s/p Avycaz/ NELLY (6/30 - 7/2) and c/w Cefepime (7/2-7/6)   - Hypercarbia improved and now continued on NC QD and AVAPs QHS.   - Will need home NIV set up prior to DC and f/u with Dr. Young outpatient for possible lung transplant    # Acute on Chronic Sinusitis   - CT SINUS 6/30 with extensive inflammatory changes throughout the paranasal sinuses and their respective drainage pathways, , improved in the right frontal and sphenoid sinuses since comparison. 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 Cannot rule out CSF leak. Thinning of the left carotid canal wall along the posterior left sphenoid sinus. Cannot rule out dehiscence.  - Continue on Flonase and [place] BID   - MRI SINUSES pending   - ENT following    # Nodular Liver with possible Cirrhosis ?   - During admission in April patient was found to have nodular contour of liver on US 4/6/2021. Upon further discussion with patient and Hepatology, she was noted to have history of blood transfusion in 1998 with (+) Hep C AB with negative Hep C RNA. She also reports history of CBD stricture in 1998 in [place], s/p PTC/PTBD.   - CLD work up previously noted with HBsAg negative, HBcIgM negative, Hep E IgG negative, Hep A IgM negative, RUPESH, Anti-LKM and AMA negative, Ferritin 169, AFP within normal limits, ASMA 1:40. MR/MRCP without stricturing or hepatic lesions. No ascites on all imaging and no history of HE. No known varices/ no EGD record on record.   - Hepatology consulted who recommends liver bx     # IDDM2, A1C 8.9 (6/2021)   - On Lantus 14U QHS and sliding scale at home.   - Continue on Lantus 15U QHS and Lispro 8U + Moderate ISS in house while on steroids.   - Monitor FS and adjust insulin as needed.     # Ethics/ DISPO/ GOC  - FULL CODE and GOC discussed with Son, Michela ([phone]) who expresses wishes of DNI, however will discuss further for DNR. Family wishes for all medical treatment (including invasive and possible lung transplant) to be performed, however if her condition was to worsen they do not wish for intubation. GOC conversation ongoing.   - PT recommends home with home PT     # DVT PPX with Lovenox SQ QD

## 2021-07-04 NOTE — PROVIDER CONTACT NOTE (CRITICAL VALUE NOTIFICATION) - ASSESSMENT
VBG resulted PCO2 89. Pt asymptomatic. AVAPS not used overnight as overnight oxygen saturation monitoring complete prior 2 nights.
pt. is not. confused at times. pt is not in distress.
pt. is alert, confused at times. not in distress or pain

## 2021-07-04 NOTE — PROGRESS NOTE ADULT - ATTENDING COMMENTS
66 F HCV, ABPA, cirrhosis, cystic bronchiectasis here with acute hypoxemic and hypercapnic respiratory failure due to ILD exacerbation    - s/p prednisone for ILD exacerbation.  Finishing course of cefepime given pseudomonas   - MRI pending for sinus eval  - possible liver biopsy to evaluate cirrhosis in anticipation of possible lung transplant

## 2021-07-05 LAB
ANION GAP SERPL CALC-SCNC: 5 MMOL/L — LOW (ref 7–14)
BLOOD GAS VENOUS COMPREHENSIVE RESULT: SIGNIFICANT CHANGE UP
BUN SERPL-MCNC: 12 MG/DL — SIGNIFICANT CHANGE UP (ref 7–23)
CALCIUM SERPL-MCNC: 9.4 MG/DL — SIGNIFICANT CHANGE UP (ref 8.4–10.5)
CHLORIDE SERPL-SCNC: 96 MMOL/L — LOW (ref 98–107)
CO2 SERPL-SCNC: 37 MMOL/L — HIGH (ref 22–31)
CREAT SERPL-MCNC: 0.34 MG/DL — LOW (ref 0.5–1.3)
GLUCOSE BLDC GLUCOMTR-MCNC: 110 MG/DL — HIGH (ref 70–99)
GLUCOSE BLDC GLUCOMTR-MCNC: 147 MG/DL — HIGH (ref 70–99)
GLUCOSE BLDC GLUCOMTR-MCNC: 157 MG/DL — HIGH (ref 70–99)
GLUCOSE BLDC GLUCOMTR-MCNC: 180 MG/DL — HIGH (ref 70–99)
GLUCOSE BLDC GLUCOMTR-MCNC: 257 MG/DL — HIGH (ref 70–99)
GLUCOSE BLDC GLUCOMTR-MCNC: 298 MG/DL — HIGH (ref 70–99)
GLUCOSE BLDC GLUCOMTR-MCNC: 48 MG/DL — CRITICAL LOW (ref 70–99)
GLUCOSE SERPL-MCNC: 128 MG/DL — HIGH (ref 70–99)
HCT VFR BLD CALC: 40 % — SIGNIFICANT CHANGE UP (ref 34.5–45)
HGB BLD-MCNC: 12.1 G/DL — SIGNIFICANT CHANGE UP (ref 11.5–15.5)
MAGNESIUM SERPL-MCNC: 2 MG/DL — SIGNIFICANT CHANGE UP (ref 1.6–2.6)
MCHC RBC-ENTMCNC: 27 PG — SIGNIFICANT CHANGE UP (ref 27–34)
MCHC RBC-ENTMCNC: 30.3 GM/DL — LOW (ref 32–36)
MCV RBC AUTO: 89.3 FL — SIGNIFICANT CHANGE UP (ref 80–100)
NRBC # BLD: 0 /100 WBCS — SIGNIFICANT CHANGE UP
NRBC # FLD: 0 K/UL — SIGNIFICANT CHANGE UP
PHOSPHATE SERPL-MCNC: 3.5 MG/DL — SIGNIFICANT CHANGE UP (ref 2.5–4.5)
PLATELET # BLD AUTO: 165 K/UL — SIGNIFICANT CHANGE UP (ref 150–400)
POTASSIUM SERPL-MCNC: 4.3 MMOL/L — SIGNIFICANT CHANGE UP (ref 3.5–5.3)
POTASSIUM SERPL-SCNC: 4.3 MMOL/L — SIGNIFICANT CHANGE UP (ref 3.5–5.3)
RBC # BLD: 4.48 M/UL — SIGNIFICANT CHANGE UP (ref 3.8–5.2)
RBC # FLD: 13.5 % — SIGNIFICANT CHANGE UP (ref 10.3–14.5)
SODIUM SERPL-SCNC: 138 MMOL/L — SIGNIFICANT CHANGE UP (ref 135–145)
WBC # BLD: 7.32 K/UL — SIGNIFICANT CHANGE UP (ref 3.8–10.5)
WBC # FLD AUTO: 7.32 K/UL — SIGNIFICANT CHANGE UP (ref 3.8–10.5)

## 2021-07-05 PROCEDURE — 99233 SBSQ HOSP IP/OBS HIGH 50: CPT | Mod: GC

## 2021-07-05 RX ADMIN — MONTELUKAST 10 MILLIGRAM(S): 4 TABLET, CHEWABLE ORAL at 11:12

## 2021-07-05 RX ADMIN — SENNA PLUS 2 TABLET(S): 8.6 TABLET ORAL at 22:07

## 2021-07-05 RX ADMIN — POLYETHYLENE GLYCOL 3350 17 GRAM(S): 17 POWDER, FOR SOLUTION ORAL at 11:12

## 2021-07-05 RX ADMIN — Medication 325 MILLIGRAM(S): at 11:21

## 2021-07-05 RX ADMIN — Medication 2 SPRAY(S): at 05:38

## 2021-07-05 RX ADMIN — PANTOPRAZOLE SODIUM 40 MILLIGRAM(S): 20 TABLET, DELAYED RELEASE ORAL at 05:38

## 2021-07-05 RX ADMIN — FAMOTIDINE 40 MILLIGRAM(S): 10 INJECTION INTRAVENOUS at 22:06

## 2021-07-05 RX ADMIN — MIRTAZAPINE 7.5 MILLIGRAM(S): 45 TABLET, ORALLY DISINTEGRATING ORAL at 22:06

## 2021-07-05 RX ADMIN — Medication 2 SPRAY(S): at 17:28

## 2021-07-05 RX ADMIN — Medication 6: at 08:10

## 2021-07-05 RX ADMIN — Medication 3 MILLILITER(S): at 04:24

## 2021-07-05 RX ADMIN — CEFEPIME 100 MILLIGRAM(S): 1 INJECTION, POWDER, FOR SOLUTION INTRAMUSCULAR; INTRAVENOUS at 05:37

## 2021-07-05 RX ADMIN — Medication 3 MILLILITER(S): at 15:48

## 2021-07-05 RX ADMIN — Medication 4 MILLILITER(S): at 04:24

## 2021-07-05 RX ADMIN — CHLORHEXIDINE GLUCONATE 1 APPLICATION(S): 213 SOLUTION TOPICAL at 11:20

## 2021-07-05 RX ADMIN — Medication 500 MILLIGRAM(S): at 11:13

## 2021-07-05 RX ADMIN — Medication 3 MILLILITER(S): at 10:03

## 2021-07-05 RX ADMIN — Medication 3 MILLILITER(S): at 22:45

## 2021-07-05 RX ADMIN — INSULIN GLARGINE 15 UNIT(S): 100 INJECTION, SOLUTION SUBCUTANEOUS at 22:14

## 2021-07-05 RX ADMIN — AMLODIPINE BESYLATE 5 MILLIGRAM(S): 2.5 TABLET ORAL at 05:37

## 2021-07-05 RX ADMIN — Medication 8 UNIT(S): at 17:27

## 2021-07-05 RX ADMIN — CEFEPIME 100 MILLIGRAM(S): 1 INJECTION, POWDER, FOR SOLUTION INTRAMUSCULAR; INTRAVENOUS at 17:29

## 2021-07-05 RX ADMIN — ENOXAPARIN SODIUM 40 MILLIGRAM(S): 100 INJECTION SUBCUTANEOUS at 11:12

## 2021-07-05 RX ADMIN — Medication 6: at 17:26

## 2021-07-05 RX ADMIN — BUDESONIDE AND FORMOTEROL FUMARATE DIHYDRATE 2 PUFF(S): 160; 4.5 AEROSOL RESPIRATORY (INHALATION) at 22:40

## 2021-07-05 RX ADMIN — Medication 8 UNIT(S): at 08:11

## 2021-07-05 RX ADMIN — BUDESONIDE AND FORMOTEROL FUMARATE DIHYDRATE 2 PUFF(S): 160; 4.5 AEROSOL RESPIRATORY (INHALATION) at 10:04

## 2021-07-05 NOTE — PROGRESS NOTE ADULT - ASSESSMENT
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 Patient now presented from Pulmonary Office, Dr. Young for worsening SOB, lethargy and cough with thick yellow secretions. Now readmitted for acute on chronic hypercarbic respiratory failure likely in setting of bronchiectasis exacerbation with recurrent PNA.     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   - CT CHEST 6/30 with (+) bronchiectasis   - SCx 6/30 with Pansensitive Pseudomonas, pending final.   - Continue on aggressive chest PT with Mucomyst, Duoneb and Chest Vest Q6H   - Continue on Symbicort BID and Singular QD   - s/p Prednisone 40mg QD (6/30 - 7/3)   - s/p Avycaz/ NELLY (6/30 - 7/2) and c/w Cefepime (7/2-7/6)   - Hypercarbia improved and now continued on NC QD and AVAPs QHS.   - Will need home NIV set up prior to DC and f/u with Dr. Young outpatient for possible lung transplant    # Acute on Chronic Sinusitis   - CT SINUS 6/30 with extensive inflammatory changes throughout the paranasal sinuses and their respective drainage pathways, , improved in the right frontal and sphenoid sinuses since comparison. 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 Cannot rule out CSF leak. Thinning of the left carotid canal wall along the posterior left sphenoid sinus. Cannot rule out dehiscence.  - Continue on Flonase and [place] BID   - MRI SINUSES pending   - ENT following    # Nodular Liver with possible Cirrhosis ?   - During admission in April patient was found to have nodular contour of liver on US 4/6/2021. Upon further discussion with patient and Hepatology, she was noted to have history of blood transfusion in 1998 with (+) Hep C AB with negative Hep C RNA. She also reports history of CBD stricture in 1998 in [place], s/p PTC/PTBD.   - CLD work up previously noted with HBsAg negative, HBcIgM negative, Hep E IgG negative, Hep A IgM negative, RUPESH, Anti-LKM and AMA negative, Ferritin 169, AFP within normal limits, ASMA 1:40. MR/MRCP without stricturing or hepatic lesions. No ascites on all imaging and no history of HE. No known varices/ no EGD record on record.   - Hepatology consulted who recommends liver bx     # IDDM2, A1C 8.9 (6/2021)   - On Lantus 14U QHS and sliding scale at home.   - Continue on Lantus 15U QHS and Lispro 8U + Moderate ISS in house while on steroids.   - Monitor FS and adjust insulin as needed.     # Ethics/ DISPO/ GOC  - FULL CODE and GOC discussed with Son, Michela ([phone]) who expresses wishes of DNI, however will discuss further for DNR. Family wishes for all medical treatment (including invasive and possible lung transplant) to be performed, however if her condition was to worsen they do not wish for intubation. GOC conversation ongoing.   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 Patient now presented from Pulmonary Office, Dr. Young for worsening SOB, lethargy and cough with thick yellow secretions. Now readmitted for acute on chronic hypercarbic respiratory failure likely in setting of bronchiectasis exacerbation with recurrent PNA.     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   - CT CHEST 6/30 with (+) bronchiectasis   - SCx 6/30 with Pseudomonas Aeurginosa  - Continue on aggressive chest PT with Mucomyst, Duoneb and Chest Vest Q6H   - Continue on Symbicort BID and Singular QD   - s/p Prednisone 40mg QD (6/30 - 7/3)   - s/p Avycaz/ NELLY (6/30 - 7/2) and c/w Cefepime (7/2-7/6)   - Hypercarbia improved and now continued on NC QD and AVAPs QHS.   - Will need home NIV set up prior to DC and f/u with Dr. Young outpatient for possible lung transplant    # Acute on Chronic Sinusitis   - CT SINUS 6/30 with extensive inflammatory changes throughout the paranasal sinuses and their respective drainage pathways, , improved in the right frontal and sphenoid sinuses since comparison. 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 Cannot rule out CSF leak. Thinning of the left carotid canal wall along the posterior left sphenoid sinus. Cannot rule out dehiscence.  - Continue on Flonase and [place] BID   - MRI SINUSES pending   - ENT following    # Nodular Liver with possible Cirrhosis ?   - During admission in April patient was found to have nodular contour of liver on US 4/6/2021. Upon further discussion with patient and Hepatology, she was noted to have history of blood transfusion in 1998 with (+) Hep C AB with negative Hep C RNA. She also reports history of CBD stricture in 1998 in [place], s/p PTC/PTBD.   - CLD work up previously noted with HBsAg negative, HBcIgM negative, Hep E IgG negative, Hep A IgM negative, RUPESH, Anti-LKM and AMA negative, Ferritin 169, AFP within normal limits, ASMA 1:40. MR/MRCP without stricturing or hepatic lesions. No ascites on all imaging and no history of HE. No known varices/ no EGD record on record.   - Hepatology consulted who recommends liver bx     # IDDM2, A1C 8.9 (6/2021)   - At home on: Lantus 14U QHS and sliding scale  - In house on: Lantus 15U QHS and Lispro 8U + Moderate ISS in house while on steroids.   - Pt with episode of hypoglycemia at lunch today, per nursing staff pt did eat her breakfast this am  - Will trend FS: adjustments will be made after evaluating dinner time FS if warranted     # Ethics/ DISPO/ GOC  - FULL CODE and GOC discussed with Son, Michela ([phone]) who expresses wishes of DNI, however will discuss further for DNR. Family wishes for all medical treatment (including invasive and possible lung transplant) to be performed, however if her condition was to worsen they do not wish for intubation. GOC conversation ongoing.   - PT recommends home with home PT   - Plan of care discussed with tabitha Rodrigues via phone while at bedside today 7/5     # DVT PPX with Lovenox SQ QD

## 2021-07-05 NOTE — PROGRESS NOTE ADULT - SUBJECTIVE AND OBJECTIVE BOX
CHIEF COMPLAINT:    Interval Events:    REVIEW OF SYSTEMS:  Constitutional:   Eyes:  ENT:  CV:  Resp:  GI:  :  MSK:  Integumentary:  Neurological:  Psychiatric:  Endocrine:  Hematologic/Lymphatic:  Allergic/Immunologic:  [ ] All other systems negative  [ ] Unable to assess ROS because ________    OBJECTIVE:  ICU Vital Signs Last 24 Hrs  T(C): 36.4 (05 Jul 2021 05:36), Max: 36.6 (04 Jul 2021 14:46)  T(F): 97.6 (05 Jul 2021 05:36), Max: 97.9 (04 Jul 2021 14:46)  HR: 92 (05 Jul 2021 05:36) (72 - 92)  BP: 124/68 (05 Jul 2021 05:36) (121/60 - 142/76)  BP(mean): --  ABP: --  ABP(mean): --  RR: 18 (05 Jul 2021 05:36) (18 - 20)  SpO2: 97% (05 Jul 2021 05:36) (94% - 98%)    Mode: NIV (Noninvasive Ventilation), RR (machine): 18, TV (machine): 380, FiO2: 28, PEEP: 5, ITime: 1, PIP: 16    07-04 @ 07:01  -  07-05 @ 06:56  --------------------------------------------------------  IN: 50 mL / OUT: 0 mL / NET: 50 mL      CAPILLARY BLOOD GLUCOSE      POCT Blood Glucose.: 184 mg/dL (04 Jul 2021 21:07)      PHYSICAL EXAM:  General:   HEENT:   Lymph Nodes:  Neck:   Respiratory:   Cardiovascular:   Abdomen:   Extremities:   Skin:   Neurological:  Psychiatry:    HOSPITAL MEDICATIONS:  MEDICATIONS  (STANDING):  albuterol/ipratropium for Nebulization 3 milliLiter(s) Nebulizer every 6 hours  amLODIPine   Tablet 5 milliGRAM(s) Oral daily  ascorbic acid 500 milliGRAM(s) Oral daily  budesonide 160 MICROgram(s)/formoterol 4.5 MICROgram(s) Inhaler 2 Puff(s) Inhalation two times a day  cefepime   IVPB      cefepime   IVPB 2000 milliGRAM(s) IV Intermittent every 12 hours  chlorhexidine 4% Liquid 1 Application(s) Topical daily  dextrose 40% Gel 15 Gram(s) Oral once  dextrose 5%. 1000 milliLiter(s) (50 mL/Hr) IV Continuous <Continuous>  dextrose 5%. 1000 milliLiter(s) (100 mL/Hr) IV Continuous <Continuous>  dextrose 50% Injectable 25 Gram(s) IV Push once  dextrose 50% Injectable 12.5 Gram(s) IV Push once  dextrose 50% Injectable 25 Gram(s) IV Push once  enoxaparin Injectable 40 milliGRAM(s) SubCutaneous daily  famotidine    Tablet 40 milliGRAM(s) Oral at bedtime  ferrous    sulfate 325 milliGRAM(s) Oral daily  fluticasone propionate 50 MICROgram(s)/spray Nasal Spray 2 Spray(s) Both Nostrils two times a day  glucagon  Injectable 1 milliGRAM(s) IntraMuscular once  insulin glargine Injectable (LANTUS) 15 Unit(s) SubCutaneous at bedtime  insulin lispro (ADMELOG) corrective regimen sliding scale   SubCutaneous three times a day before meals  insulin lispro (ADMELOG) corrective regimen sliding scale   SubCutaneous at bedtime  insulin lispro Injectable (ADMELOG) 8 Unit(s) SubCutaneous three times a day before meals  mirtazapine 7.5 milliGRAM(s) Oral at bedtime  montelukast 10 milliGRAM(s) Oral daily  pantoprazole    Tablet 40 milliGRAM(s) Oral before breakfast  polyethylene glycol 3350 17 Gram(s) Oral daily  senna 2 Tablet(s) Oral at bedtime  sodium chloride 0.65% Nasal 2 Spray(s) Both Nostrils every 12 hours    MEDICATIONS  (PRN):  acetaminophen   Tablet .. 650 milliGRAM(s) Oral every 4 hours PRN Mild Pain (1 - 3)  guaiFENesin Oral Liquid (Sugar-Free) 100 milliGRAM(s) Oral every 6 hours PRN Cough      LABS:                        12.1   7.32  )-----------( 165      ( 05 Jul 2021 06:19 )             40.0     07-05    138  |  96<L>  |  12  ----------------------------<  128<H>  4.3   |  37<H>  |  0.34<L>    Ca    9.4      05 Jul 2021 06:19  Phos  3.5     07-05  Mg     2.00     07-05            Venous Blood Gas:  07-05 @ 06:19  7.37/75/60/38/91.7  VBG Lactate: 0.9  Venous Blood Gas:  07-04 @ 07:35  7.31/89/133/38/98.7  VBG Lactate: 1.2      MICROBIOLOGY:     RADIOLOGY:  [ ] Reviewed and interpreted by me    PULMONARY FUNCTION TESTS:    EKG:    I&O's Summary    04 Jul 2021 07:01  -  05 Jul 2021 06:56  --------------------------------------------------------  IN: 50 mL / OUT: 0 mL / NET: 50 mL     CHIEF COMPLAINT: Patient is a 66y old  Female who presents with a chief complaint of SOB (05 Jul 2021 06:56)    Interval Events: Pt with episode of hypoglycemia today before lunch (FS 48, repeat 69). Hypoglycemic protocol followed.     ROS: Seen by bedside during AM rounds and notes she is weak. No complaints of SOB, +difficulty expelling secretions.     OBJECTIVE:  ICU Vital Signs Last 24 Hrs  T(C): 36.4 (05 Jul 2021 05:36), Max: 36.6 (04 Jul 2021 14:46)  T(F): 97.6 (05 Jul 2021 05:36), Max: 97.9 (04 Jul 2021 14:46)  HR: 92 (05 Jul 2021 05:36) (72 - 92)  BP: 124/68 (05 Jul 2021 05:36) (121/60 - 142/76)  BP(mean): --  ABP: --  ABP(mean): --  RR: 18 (05 Jul 2021 05:36) (18 - 20)  SpO2: 97% (05 Jul 2021 05:36) (94% - 98%)    Mode: NIV (Noninvasive Ventilation), RR (machine): 18, TV (machine): 380, FiO2: 28, PEEP: 5, ITime: 1, PIP: 16    07-04 @ 07:01  -  07-05 @ 06:56  --------------------------------------------------------  IN: 50 mL / OUT: 0 mL / NET: 50 mL      CAPILLARY BLOOD GLUCOSE      POCT Blood Glucose.: 184 mg/dL (04 Jul 2021 21:07)    PHYSICAL EXAM:  General: NAD and well groomed  HEENT: NC and PERRLA. No rhinorrhea  Cardio: RRR, S1/S2, no murmurs or rubs  Pulm: (+) Rhonchi  GI: Soft, NDNT, BS (+)  MS: No pedal edema    Neuro: AOx. No focal neurological deficits noted  Skin: Warm and dry. No jaundice or cyanosis      HOSPITAL MEDICATIONS:  MEDICATIONS  (STANDING):  albuterol/ipratropium for Nebulization 3 milliLiter(s) Nebulizer every 6 hours  amLODIPine   Tablet 5 milliGRAM(s) Oral daily  ascorbic acid 500 milliGRAM(s) Oral daily  budesonide 160 MICROgram(s)/formoterol 4.5 MICROgram(s) Inhaler 2 Puff(s) Inhalation two times a day  cefepime   IVPB      cefepime   IVPB 2000 milliGRAM(s) IV Intermittent every 12 hours  chlorhexidine 4% Liquid 1 Application(s) Topical daily  dextrose 40% Gel 15 Gram(s) Oral once  dextrose 5%. 1000 milliLiter(s) (50 mL/Hr) IV Continuous <Continuous>  dextrose 5%. 1000 milliLiter(s) (100 mL/Hr) IV Continuous <Continuous>  dextrose 50% Injectable 25 Gram(s) IV Push once  dextrose 50% Injectable 12.5 Gram(s) IV Push once  dextrose 50% Injectable 25 Gram(s) IV Push once  enoxaparin Injectable 40 milliGRAM(s) SubCutaneous daily  famotidine    Tablet 40 milliGRAM(s) Oral at bedtime  ferrous    sulfate 325 milliGRAM(s) Oral daily  fluticasone propionate 50 MICROgram(s)/spray Nasal Spray 2 Spray(s) Both Nostrils two times a day  glucagon  Injectable 1 milliGRAM(s) IntraMuscular once  insulin glargine Injectable (LANTUS) 15 Unit(s) SubCutaneous at bedtime  insulin lispro (ADMELOG) corrective regimen sliding scale   SubCutaneous three times a day before meals  insulin lispro (ADMELOG) corrective regimen sliding scale   SubCutaneous at bedtime  insulin lispro Injectable (ADMELOG) 8 Unit(s) SubCutaneous three times a day before meals  mirtazapine 7.5 milliGRAM(s) Oral at bedtime  montelukast 10 milliGRAM(s) Oral daily  pantoprazole    Tablet 40 milliGRAM(s) Oral before breakfast  polyethylene glycol 3350 17 Gram(s) Oral daily  senna 2 Tablet(s) Oral at bedtime  sodium chloride 0.65% Nasal 2 Spray(s) Both Nostrils every 12 hours    MEDICATIONS  (PRN):  acetaminophen   Tablet .. 650 milliGRAM(s) Oral every 4 hours PRN Mild Pain (1 - 3)  guaiFENesin Oral Liquid (Sugar-Free) 100 milliGRAM(s) Oral every 6 hours PRN Cough      LABS:                        12.1   7.32  )-----------( 165      ( 05 Jul 2021 06:19 )             40.0     07-05    138  |  96<L>  |  12  ----------------------------<  128<H>  4.3   |  37<H>  |  0.34<L>    Ca    9.4      05 Jul 2021 06:19  Phos  3.5     07-05  Mg     2.00     07-05            Venous Blood Gas:  07-05 @ 06:19  7.37/75/60/38/91.7  VBG Lactate: 0.9  Venous Blood Gas:  07-04 @ 07:35  7.31/89/133/38/98.7  VBG Lactate: 1.2      MICROBIOLOGY:     RADIOLOGY:  [ ] Reviewed and interpreted by me    PULMONARY FUNCTION TESTS:    EKG:    I&O's Summary    04 Jul 2021 07:01  -  05 Jul 2021 06:56  --------------------------------------------------------  IN: 50 mL / OUT: 0 mL / NET: 50 mL

## 2021-07-05 NOTE — PROGRESS NOTE ADULT - ATTENDING COMMENTS
66 F HCV, ABPA, cirrhosis, cystic bronchiectasis here with acute hypoxemic and hypercapnic respiratory failure due to ILD exacerbation    - s/p prednisone for ILD exacerbation  - cefepime for pseudomonas in the sputum  - MRI for sinus evaluation pending  - follow up with IR re possible liver biopsy to evaluate cirrhosis

## 2021-07-06 LAB
GLUCOSE BLDC GLUCOMTR-MCNC: 105 MG/DL — HIGH (ref 70–99)
GLUCOSE BLDC GLUCOMTR-MCNC: 122 MG/DL — HIGH (ref 70–99)
GLUCOSE BLDC GLUCOMTR-MCNC: 163 MG/DL — HIGH (ref 70–99)
GLUCOSE BLDC GLUCOMTR-MCNC: 167 MG/DL — HIGH (ref 70–99)
GLUCOSE BLDC GLUCOMTR-MCNC: 242 MG/DL — HIGH (ref 70–99)
GLUCOSE BLDC GLUCOMTR-MCNC: 283 MG/DL — HIGH (ref 70–99)
GLUCOSE BLDC GLUCOMTR-MCNC: 65 MG/DL — LOW (ref 70–99)
GLUCOSE BLDC GLUCOMTR-MCNC: 65 MG/DL — LOW (ref 70–99)
GLUCOSE BLDC GLUCOMTR-MCNC: 69 MG/DL — LOW (ref 70–99)

## 2021-07-06 PROCEDURE — 93306 TTE W/DOPPLER COMPLETE: CPT | Mod: 26

## 2021-07-06 PROCEDURE — 99232 SBSQ HOSP IP/OBS MODERATE 35: CPT | Mod: GC

## 2021-07-06 PROCEDURE — 99233 SBSQ HOSP IP/OBS HIGH 50: CPT | Mod: GC

## 2021-07-06 RX ORDER — INSULIN GLARGINE 100 [IU]/ML
8 INJECTION, SOLUTION SUBCUTANEOUS AT BEDTIME
Refills: 0 | Status: COMPLETED | OUTPATIENT
Start: 2021-07-06 | End: 2021-07-06

## 2021-07-06 RX ORDER — SODIUM CHLORIDE 9 MG/ML
3 INJECTION INTRAMUSCULAR; INTRAVENOUS; SUBCUTANEOUS EVERY 6 HOURS
Refills: 0 | Status: DISCONTINUED | OUTPATIENT
Start: 2021-07-06 | End: 2021-07-19

## 2021-07-06 RX ADMIN — SODIUM CHLORIDE 3 MILLILITER(S): 9 INJECTION INTRAMUSCULAR; INTRAVENOUS; SUBCUTANEOUS at 21:47

## 2021-07-06 RX ADMIN — BUDESONIDE AND FORMOTEROL FUMARATE DIHYDRATE 2 PUFF(S): 160; 4.5 AEROSOL RESPIRATORY (INHALATION) at 10:01

## 2021-07-06 RX ADMIN — PANTOPRAZOLE SODIUM 40 MILLIGRAM(S): 20 TABLET, DELAYED RELEASE ORAL at 05:47

## 2021-07-06 RX ADMIN — Medication 3 MILLILITER(S): at 19:33

## 2021-07-06 RX ADMIN — Medication 325 MILLIGRAM(S): at 12:13

## 2021-07-06 RX ADMIN — AMLODIPINE BESYLATE 5 MILLIGRAM(S): 2.5 TABLET ORAL at 05:47

## 2021-07-06 RX ADMIN — Medication 2 SPRAY(S): at 17:43

## 2021-07-06 RX ADMIN — Medication 500 MILLIGRAM(S): at 12:15

## 2021-07-06 RX ADMIN — Medication 6: at 07:56

## 2021-07-06 RX ADMIN — Medication 2 SPRAY(S): at 05:48

## 2021-07-06 RX ADMIN — Medication 3 MILLILITER(S): at 03:46

## 2021-07-06 RX ADMIN — Medication 8 UNIT(S): at 07:54

## 2021-07-06 RX ADMIN — INSULIN GLARGINE 8 UNIT(S): 100 INJECTION, SOLUTION SUBCUTANEOUS at 21:43

## 2021-07-06 RX ADMIN — Medication 4: at 17:30

## 2021-07-06 RX ADMIN — MONTELUKAST 10 MILLIGRAM(S): 4 TABLET, CHEWABLE ORAL at 12:12

## 2021-07-06 RX ADMIN — SENNA PLUS 2 TABLET(S): 8.6 TABLET ORAL at 21:44

## 2021-07-06 RX ADMIN — CEFEPIME 100 MILLIGRAM(S): 1 INJECTION, POWDER, FOR SOLUTION INTRAMUSCULAR; INTRAVENOUS at 17:57

## 2021-07-06 RX ADMIN — CEFEPIME 100 MILLIGRAM(S): 1 INJECTION, POWDER, FOR SOLUTION INTRAMUSCULAR; INTRAVENOUS at 05:48

## 2021-07-06 RX ADMIN — Medication 8 UNIT(S): at 17:31

## 2021-07-06 RX ADMIN — BUDESONIDE AND FORMOTEROL FUMARATE DIHYDRATE 2 PUFF(S): 160; 4.5 AEROSOL RESPIRATORY (INHALATION) at 21:48

## 2021-07-06 RX ADMIN — CHLORHEXIDINE GLUCONATE 1 APPLICATION(S): 213 SOLUTION TOPICAL at 12:16

## 2021-07-06 RX ADMIN — POLYETHYLENE GLYCOL 3350 17 GRAM(S): 17 POWDER, FOR SOLUTION ORAL at 12:15

## 2021-07-06 RX ADMIN — ENOXAPARIN SODIUM 40 MILLIGRAM(S): 100 INJECTION SUBCUTANEOUS at 12:11

## 2021-07-06 RX ADMIN — Medication 3 MILLILITER(S): at 10:04

## 2021-07-06 RX ADMIN — FAMOTIDINE 40 MILLIGRAM(S): 10 INJECTION INTRAVENOUS at 21:44

## 2021-07-06 RX ADMIN — MIRTAZAPINE 7.5 MILLIGRAM(S): 45 TABLET, ORALLY DISINTEGRATING ORAL at 21:43

## 2021-07-06 NOTE — PROGRESS NOTE ADULT - ASSESSMENT
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 Patient now presented from Pulmonary Office, Dr. Young for worsening SOB, lethargy and cough with thick yellow secretions. Now readmitted for acute on chronic hypercarbic respiratory failure likely in setting of bronchiectasis exacerbation with recurrent PNA.     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   - CT CHEST 6/30 with (+) bronchiectasis   - SCx 6/30 with Pseudomonas Aeurginosa  - Continue on aggressive chest PT with Mucomyst, Duoneb and Chest Vest Q6H   - Continue on Symbicort BID and Singular QD   - s/p Prednisone 40mg QD (6/30 - 7/3)   - s/p Avycaz/ NELLY (6/30 - 7/2) and c/w Cefepime (7/2-7/6)   - Hypercarbia improved and now continued on NC QD and AVAPs QHS.   - Will need home NIV set up prior to DC and f/u with Dr. Young outpatient for possible lung transplant    # Acute on Chronic Sinusitis   - CT SINUS 6/30 with extensive inflammatory changes throughout the paranasal sinuses and their respective drainage pathways, , improved in the right frontal and sphenoid sinuses since comparison. 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 Cannot rule out CSF leak. Thinning of the left carotid canal wall along the posterior left sphenoid sinus. Cannot rule out dehiscence.  - Continue on Flonase and [place] BID   - MRI SINUSES pending   - ENT following    # Nodular Liver with possible Cirrhosis ?   - During admission in April patient was found to have nodular contour of liver on US 4/6/2021. Upon further discussion with patient and Hepatology, she was noted to have history of blood transfusion in 1998 with (+) Hep C AB with negative Hep C RNA. She also reports history of CBD stricture in 1998 in [place], s/p PTC/PTBD.   - CLD work up previously noted with HBsAg negative, HBcIgM negative, Hep E IgG negative, Hep A IgM negative, RUPESH, Anti-LKM and AMA negative, Ferritin 169, AFP within normal limits, ASMA 1:40. MR/MRCP without stricturing or hepatic lesions. No ascites on all imaging and no history of HE. No known varices/ no EGD record on record.   - Hepatology consulted who recommends liver bx     # IDDM2, A1C 8.9 (6/2021)   - At home on: Lantus 14U QHS and sliding scale  - In house on: Lantus 15U QHS and Lispro 8U + Moderate ISS in house while on steroids.   - Pt with episode of hypoglycemia at lunch today, per nursing staff pt did eat her breakfast this am  - Will trend FS: adjustments will be made after evaluating dinner time FS if warranted     # Ethics/ DISPO/ GOC  - FULL CODE and GOC discussed with Son, Michela ([phone]) who expresses wishes of DNI, however will discuss further for DNR. Family wishes for all medical treatment (including invasive and possible lung transplant) to be performed, however if her condition was to worsen they do not wish for intubation. GOC conversation ongoing.   - PT recommends home with home PT   - Plan of care discussed with tabitha Rodrigues via phone while at bedside today 7/5     # DVT PPX with Lovenox SQ QD     67 YO Leandro speaking Female with PMHx of Diffuse Cystic Bronchiectasis, Primary Ciliary Dyskinesia on 2L NC, Recurrent Sinusitis, ABPA (unclear history, aspergillus AB negative from 8/2020), HTN, GERD, IDDM2 A1C 8.9 (6/2021) and recent admission in April 2021 for  Pseudomonas PNA s/p Avycaz and NELLY. Patient now presented from Pulmonary Office, Dr. Young for worsening SOB, lethargy and cough with thick yellow secretions. Now readmitted for acute on chronic hypercarbic respiratory failure likely in setting of bronchiectasis exacerbation with recurrent PNA.     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   - CT CHEST 6/30 with (+) bronchiectasis   - SCx 6/30 with Pseudomonas Aeurginosa  - Continue on aggressive chest PT with Mucomyst, Duoneb and Chest Vest Q6H   - Continue on Symbicort BID and Singular QD   - s/p Prednisone 40mg QD (6/30 - 7/3)   - s/p Avycaz/ NELLY (6/30 - 7/2) and c/w Cefepime (7/2- until 7/14)   - Hypercarbia improved and now continued on NC QD and AVAPs QHS.   - Will need home NIV set up prior to DC and f/u with Dr. Young outpatient for possible Lung transplant    # Acute on Chronic Sinusitis   - CT SINUS 6/30 with extensive inflammatory changes throughout the paranasal sinuses and their respective drainage pathways, , improved in the right frontal and sphenoid sinuses since comparison. 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 Cannot rule out CSF leak. Thinning of the left carotid canal wall along the posterior left sphenoid sinus. Cannot rule out dehiscence.  - Continue on Flonase and [place] BID   - MRI Sinuses pending   - ENT following    # Nodular Liver with possible Cirrhosis ?   - During admission in April patient was found to have nodular contour of liver on US 4/6/2021. Upon further discussion with patient and Hepatology, she was noted to have history of blood transfusion in 1998 with (+) Hep C AB with negative Hep C RNA. She also reports history of CBD stricture in 1998 in [place], s/p PTC/PTBD.   - CLD work up previously noted with HBsAg negative, HBcIgM negative, Hep E IgG negative, Hep A IgM negative, RUPESH, Anti-LKM and AMA negative, Ferritin 169, AFP within normal limits, ASMA 1:40. MR/MRCP without stricturing or hepatic lesions. No ascites on all imaging and no history of HE. No known varices/ no EGD record on record.   - Echo - EF 73%, unremarkable   - Hepatology consulted, recs apreciated  - Plan for Liver Bx on 7/7 - NPO p MN, hold AC for now, Routine labs for the morning    # IDDM2, A1C 8.9 (6/2021)   - At home on: Lantus 14U QHS and sliding scale  - In house on: Lantus 15U QHS and Lispro 8U + Moderate ISS in house while on steroids.   - Pt with episode of hypoglycemia at lunch today, per nursing staff pt did eat her breakfast this am  - Will trend FS: adjustments will be made after evaluating dinner time FS if warranted     # Ethics/ DISPO/ GOC  - FULL CODE and GOC discussed with Son, Michela ([phone]) who expresses wishes of DNI, however will discuss further for DNR. Family wishes for all medical treatment (including invasive and possible lung transplant) to be performed, however if her condition was to worsen they do not wish for intubation. GOC conversation ongoing.   - PT recommends home with home PT   - Plan of care discussed with son Lilia via phone while at bedside today 7/5     # DVT PPX - Hold AC for now, plan for Liver Bx on 7/7

## 2021-07-06 NOTE — PROGRESS NOTE ADULT - ATTENDING COMMENTS
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   Abdominal MRI with [facility] from 4/2021 reported cirrhosis, normal spleen, no ascites s/p cholecystectomy and pneumobilia. 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   Would recommend- workup for chronic liver disease, A1AT, IgG, A and M, a TJ liver biopsy with HVPG pressure measurement to determine cirrhosis and portal hypertension, stabilize respiratory status for liver biopsy, and continue care per primary team. 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   Abdominal MRI with [facility] from 4/2021 reported cirrhosis, normal spleen, no ascites s/p cholecystectomy and pneumobilia. 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   Would recommend- workup for chronic liver disease, A1AT, IgG, A and M, repeat MRCP to assess biliary trees, a TJ liver biopsy with HVPG pressure measurement to determine cirrhosis and portal hypertension, stabilize respiratory status for liver biopsy, and continue care per primary team

## 2021-07-06 NOTE — CONSULT NOTE ADULT - SUBJECTIVE AND OBJECTIVE BOX
----------------------------------------------------------  Interventional Radiology Brief Consult Note  -----------------------------------------------------------    Reason for Referral:   liver biopsy     Clinical Summary: 66y Female with pmh of diffuse cystic bronchiectasis, primary ciliary dyskinesia, ABPA, psuedomonas pneumonia in April 2021 who presented with sob and cough. Patient was found to have cirrhotic liver morphology on prior imaging and IR Is consulted for liver biopsy as part of lung transplant evaluation.     Vitals:  T(C): 36.9 (07-06-21 @ 13:15), Max: 36.9 (07-06-21 @ 05:50)  HR: 88 (07-06-21 @ 13:15) (39 - 95)  BP: 144/66 (07-06-21 @ 13:15) (124/62 - 144/66)  RR: 19 (07-06-21 @ 13:15) (18 - 19)  SpO2: 94% (07-06-21 @ 13:15) (94% - 98%)    Labs:           12.1  7.32)-----(165     (07-05-21 @ 06:19)         40.0     138 | 96 | 12  --------------------< 128     (07-05-21 @ 06:19)  4.3 | 37 | 0.34         Assessment: 66y Female found to have cirrhotic liver morphology on prior imaging. IR is consulted for liver biopsy as part of lung transplant evaluation.     Recommendations:  -Plan for liver biopsy 7/7/21. Please put IR procedure order under Dr. Barrow.   -NPO past midnight  -hold therpaeutic and prophylactic anticoagulants  -maintain active type and screen x 2         ----------------------------------------------------------  Interventional Radiology Brief Consult Note  -----------------------------------------------------------    Reason for Referral:   liver biopsy     Clinical Summary: 66y Female with pmh of diffuse cystic bronchiectasis, primary ciliary dyskinesia, ABPA, psuedomonas pneumonia in April 2021 who presented with sob and cough. Patient was found to have cirrhotic liver morphology on prior imaging and IR Is consulted for liver biopsy as part of lung transplant evaluation.     Vitals:  T(C): 36.9 (07-06-21 @ 13:15), Max: 36.9 (07-06-21 @ 05:50)  HR: 88 (07-06-21 @ 13:15) (39 - 95)  BP: 144/66 (07-06-21 @ 13:15) (124/62 - 144/66)  RR: 19 (07-06-21 @ 13:15) (18 - 19)  SpO2: 94% (07-06-21 @ 13:15) (94% - 98%)    Labs:           12.1  7.32)-----(165     (07-05-21 @ 06:19)         40.0     138 | 96 | 12  --------------------< 128     (07-05-21 @ 06:19)  4.3 | 37 | 0.34         Assessment: 66y Female found to have cirrhotic liver morphology on prior imaging. IR is consulted for liver biopsy (requested to be TJ liver biopsy per hepatology note) as part of lung transplant evaluation.     Recommendations:  -Plan for liver biopsy 7/7/21. Please put IR procedure order under Dr. Barrow.   -NPO past midnight  -hold therpaeutic and prophylactic anticoagulants  -maintain active type and screen x 2

## 2021-07-06 NOTE — PROVIDER CONTACT NOTE (HYPOGLYCEMIA EVENT) - NS PROVIDER CONTACT CONTRIBUTING FACTORS OF EPISODE
Pt on 15 units of lantus at bedtime  Pt has hepatic cirrhosis/Previous finger stick less than 100 mg/dL/Other (Specify)

## 2021-07-06 NOTE — PROGRESS NOTE ADULT - SUBJECTIVE AND OBJECTIVE BOX
Chief Complaint:  Patient is a 66y old  Female who presents with a chief complaint of SOB (05 Jul 2021 06:56)      Interval Events:       Hospital Medications:  acetaminophen   Tablet .. 650 milliGRAM(s) Oral every 4 hours PRN  albuterol/ipratropium for Nebulization 3 milliLiter(s) Nebulizer every 6 hours  amLODIPine   Tablet 5 milliGRAM(s) Oral daily  ascorbic acid 500 milliGRAM(s) Oral daily  budesonide 160 MICROgram(s)/formoterol 4.5 MICROgram(s) Inhaler 2 Puff(s) Inhalation two times a day  cefepime   IVPB      cefepime   IVPB 2000 milliGRAM(s) IV Intermittent every 12 hours  chlorhexidine 4% Liquid 1 Application(s) Topical daily  dextrose 40% Gel 15 Gram(s) Oral once  dextrose 5%. 1000 milliLiter(s) IV Continuous <Continuous>  dextrose 5%. 1000 milliLiter(s) IV Continuous <Continuous>  dextrose 50% Injectable 25 Gram(s) IV Push once  dextrose 50% Injectable 12.5 Gram(s) IV Push once  dextrose 50% Injectable 25 Gram(s) IV Push once  enoxaparin Injectable 40 milliGRAM(s) SubCutaneous daily  famotidine    Tablet 40 milliGRAM(s) Oral at bedtime  ferrous    sulfate 325 milliGRAM(s) Oral daily  fluticasone propionate 50 MICROgram(s)/spray Nasal Spray 2 Spray(s) Both Nostrils two times a day  glucagon  Injectable 1 milliGRAM(s) IntraMuscular once  guaiFENesin Oral Liquid (Sugar-Free) 100 milliGRAM(s) Oral every 6 hours PRN  insulin glargine Injectable (LANTUS) 15 Unit(s) SubCutaneous at bedtime  insulin lispro (ADMELOG) corrective regimen sliding scale   SubCutaneous three times a day before meals  insulin lispro (ADMELOG) corrective regimen sliding scale   SubCutaneous at bedtime  insulin lispro Injectable (ADMELOG) 8 Unit(s) SubCutaneous three times a day before meals  mirtazapine 7.5 milliGRAM(s) Oral at bedtime  montelukast 10 milliGRAM(s) Oral daily  pantoprazole    Tablet 40 milliGRAM(s) Oral before breakfast  polyethylene glycol 3350 17 Gram(s) Oral daily  senna 2 Tablet(s) Oral at bedtime  sodium chloride 0.65% Nasal 2 Spray(s) Both Nostrils every 12 hours      PMHX/PSHX:  DM (diabetes mellitus)    Bronchitis    ABPA (allergic bronchopulmonary aspergillosis)    Bronchiectasis    Asthma    Hypertension    Vitamin D deficiency    Microcytic anemia    GERD (gastroesophageal reflux disease)    Postnasal drip    Pseudomonas aeruginosa colonization    Allergic rhinitis    Recurrent pneumonia    Type 2 diabetes mellitus, with long-term current use of insulin    History of cholecystectomy            ROS:     General:  No weight loss, fevers, chills, night sweats, fatigue   Eyes:  No vision changes  ENT:  No sore throat, pain, runny nose  CV:  No chest pain, palpitations, dizziness   Resp:  No SOB, cough, wheezing  GI:  See HPI  :  No burning with urination, hematuria  Muscle:  No pain, weakness  Neuro:  No weakness/tingling, memory problems  Psych:  No fatigue, insomnia, mood problems, depression  Heme:  No easy bruisability  Skin:  No rash, edema      PHYSICAL EXAM:     GENERAL:  Well developed, no distress  HEENT:  NC/AT,  conjunctivae clear, sclera anicteric  CHEST:  Full & symmetric excursion, no increased effort w/ respirations  HEART:  Regular rhythm & rate  ABDOMEN:  Soft, non-tender, non-distended  EXTREMITIES:  no LE  edema  SKIN:  No rash/erythema/ecchymoses/petechiae/wounds/jaundice  NEURO:  Alert, oriented    Vital Signs:  Vital Signs Last 24 Hrs  T(C): 36.9 (06 Jul 2021 05:50), Max: 36.9 (06 Jul 2021 05:50)  T(F): 98.5 (06 Jul 2021 05:50), Max: 98.5 (06 Jul 2021 05:50)  HR: 86 (06 Jul 2021 07:11) (39 - 100)  BP: 124/62 (06 Jul 2021 05:50) (124/62 - 136/73)  BP(mean): 93 (05 Jul 2021 08:00) (93 - 93)  RR: 18 (06 Jul 2021 05:50) (16 - 18)  SpO2: 98% (06 Jul 2021 07:11) (87% - 98%)  Daily     Daily     LABS:                        12.1   7.32  )-----------( 165      ( 05 Jul 2021 06:19 )             40.0     07-05    138  |  96<L>  |  12  ----------------------------<  128<H>  4.3   |  37<H>  |  0.34<L>    Ca    9.4      05 Jul 2021 06:19  Phos  3.5     07-05  Mg     2.00     07-05                Imaging:             Chief Complaint:  Patient is a 66y old  Female who presents with a chief complaint of SOB (05 Jul 2021 06:56)      Interval Events: Pt reports some abdominal bloating and discomfort. Pt's son was available on the phone to provide additional history.      Hospital Medications:  acetaminophen   Tablet .. 650 milliGRAM(s) Oral every 4 hours PRN  albuterol/ipratropium for Nebulization 3 milliLiter(s) Nebulizer every 6 hours  amLODIPine   Tablet 5 milliGRAM(s) Oral daily  ascorbic acid 500 milliGRAM(s) Oral daily  budesonide 160 MICROgram(s)/formoterol 4.5 MICROgram(s) Inhaler 2 Puff(s) Inhalation two times a day  cefepime   IVPB      cefepime   IVPB 2000 milliGRAM(s) IV Intermittent every 12 hours  chlorhexidine 4% Liquid 1 Application(s) Topical daily  dextrose 40% Gel 15 Gram(s) Oral once  dextrose 5%. 1000 milliLiter(s) IV Continuous <Continuous>  dextrose 5%. 1000 milliLiter(s) IV Continuous <Continuous>  dextrose 50% Injectable 25 Gram(s) IV Push once  dextrose 50% Injectable 12.5 Gram(s) IV Push once  dextrose 50% Injectable 25 Gram(s) IV Push once  enoxaparin Injectable 40 milliGRAM(s) SubCutaneous daily  famotidine    Tablet 40 milliGRAM(s) Oral at bedtime  ferrous    sulfate 325 milliGRAM(s) Oral daily  fluticasone propionate 50 MICROgram(s)/spray Nasal Spray 2 Spray(s) Both Nostrils two times a day  glucagon  Injectable 1 milliGRAM(s) IntraMuscular once  guaiFENesin Oral Liquid (Sugar-Free) 100 milliGRAM(s) Oral every 6 hours PRN  insulin glargine Injectable (LANTUS) 15 Unit(s) SubCutaneous at bedtime  insulin lispro (ADMELOG) corrective regimen sliding scale   SubCutaneous three times a day before meals  insulin lispro (ADMELOG) corrective regimen sliding scale   SubCutaneous at bedtime  insulin lispro Injectable (ADMELOG) 8 Unit(s) SubCutaneous three times a day before meals  mirtazapine 7.5 milliGRAM(s) Oral at bedtime  montelukast 10 milliGRAM(s) Oral daily  pantoprazole    Tablet 40 milliGRAM(s) Oral before breakfast  polyethylene glycol 3350 17 Gram(s) Oral daily  senna 2 Tablet(s) Oral at bedtime  sodium chloride 0.65% Nasal 2 Spray(s) Both Nostrils every 12 hours      PMHX/PSHX:  DM (diabetes mellitus)    Bronchitis    ABPA (allergic bronchopulmonary aspergillosis)    Bronchiectasis    Asthma    Hypertension    Vitamin D deficiency    Microcytic anemia    GERD (gastroesophageal reflux disease)    Postnasal drip    Pseudomonas aeruginosa colonization    Allergic rhinitis    Recurrent pneumonia    Type 2 diabetes mellitus, with long-term current use of insulin    History of cholecystectomy            ROS:     General:  No weight loss, fevers, chills, night sweats, fatigue   Eyes:  No vision changes  ENT:  No sore throat, pain, runny nose  CV:  No chest pain, palpitations, dizziness   Resp:  +SOB, +cough, no wheezing  GI:  See HPI  :  No burning with urination, hematuria  Muscle:  No pain, weakness  Neuro:  No weakness/tingling, memory problems  Psych:  No fatigue, insomnia, mood problems, depression  Heme:  No easy bruisability  Skin:  No rash, edema      PHYSICAL EXAM:     GENERAL:  Well developed, no distress  HEENT:  NC/AT,  conjunctivae clear, sclera anicteric  CHEST:  +diffusely rhonchorus breath sounds  HEART:  Regular rhythm & rate  ABDOMEN:  Soft, non-tender, +mildly distended; +BS  EXTREMITIES:  no LE  edema  SKIN:  No rash/erythema/ecchymoses/petechiae/wounds/jaundice  NEURO:  Alert, oriented    Vital Signs:  Vital Signs Last 24 Hrs  T(C): 36.9 (06 Jul 2021 05:50), Max: 36.9 (06 Jul 2021 05:50)  T(F): 98.5 (06 Jul 2021 05:50), Max: 98.5 (06 Jul 2021 05:50)  HR: 86 (06 Jul 2021 07:11) (39 - 100)  BP: 124/62 (06 Jul 2021 05:50) (124/62 - 136/73)  BP(mean): 93 (05 Jul 2021 08:00) (93 - 93)  RR: 18 (06 Jul 2021 05:50) (16 - 18)  SpO2: 98% (06 Jul 2021 07:11) (87% - 98%)  Daily     Daily     LABS:                        12.1   7.32  )-----------( 165      ( 05 Jul 2021 06:19 )             40.0     07-05    138  |  96<L>  |  12  ----------------------------<  128<H>  4.3   |  37<H>  |  0.34<L>    Ca    9.4      05 Jul 2021 06:19  Phos  3.5     07-05  Mg     2.00     07-05                Imaging: No new imaging             Chief Complaint:  Patient is a 66y old  Female who presents with a chief complaint of SOB (05 Jul 2021 06:56)      Interval Events: Pt reports some abdominal bloating and discomfort. 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      Hospital Medications:  acetaminophen   Tablet .. 650 milliGRAM(s) Oral every 4 hours PRN  albuterol/ipratropium for Nebulization 3 milliLiter(s) Nebulizer every 6 hours  amLODIPine   Tablet 5 milliGRAM(s) Oral daily  ascorbic acid 500 milliGRAM(s) Oral daily  budesonide 160 MICROgram(s)/formoterol 4.5 MICROgram(s) Inhaler 2 Puff(s) Inhalation two times a day  cefepime   IVPB      cefepime   IVPB 2000 milliGRAM(s) IV Intermittent every 12 hours  chlorhexidine 4% Liquid 1 Application(s) Topical daily  dextrose 40% Gel 15 Gram(s) Oral once  dextrose 5%. 1000 milliLiter(s) IV Continuous <Continuous>  dextrose 5%. 1000 milliLiter(s) IV Continuous <Continuous>  dextrose 50% Injectable 25 Gram(s) IV Push once  dextrose 50% Injectable 12.5 Gram(s) IV Push once  dextrose 50% Injectable 25 Gram(s) IV Push once  enoxaparin Injectable 40 milliGRAM(s) SubCutaneous daily  famotidine    Tablet 40 milliGRAM(s) Oral at bedtime  ferrous    sulfate 325 milliGRAM(s) Oral daily  fluticasone propionate 50 MICROgram(s)/spray Nasal Spray 2 Spray(s) Both Nostrils two times a day  glucagon  Injectable 1 milliGRAM(s) IntraMuscular once  guaiFENesin Oral Liquid (Sugar-Free) 100 milliGRAM(s) Oral every 6 hours PRN  insulin glargine Injectable (LANTUS) 15 Unit(s) SubCutaneous at bedtime  insulin lispro (ADMELOG) corrective regimen sliding scale   SubCutaneous three times a day before meals  insulin lispro (ADMELOG) corrective regimen sliding scale   SubCutaneous at bedtime  insulin lispro Injectable (ADMELOG) 8 Unit(s) SubCutaneous three times a day before meals  mirtazapine 7.5 milliGRAM(s) Oral at bedtime  montelukast 10 milliGRAM(s) Oral daily  pantoprazole    Tablet 40 milliGRAM(s) Oral before breakfast  polyethylene glycol 3350 17 Gram(s) Oral daily  senna 2 Tablet(s) Oral at bedtime  sodium chloride 0.65% Nasal 2 Spray(s) Both Nostrils every 12 hours      PMHX/PSHX:  DM (diabetes mellitus)    Bronchitis    ABPA (allergic bronchopulmonary aspergillosis)    Bronchiectasis    Asthma    Hypertension    Vitamin D deficiency    Microcytic anemia    GERD (gastroesophageal reflux disease)    Postnasal drip    Pseudomonas aeruginosa colonization    Allergic rhinitis    Recurrent pneumonia    Type 2 diabetes mellitus, with long-term current use of insulin    History of cholecystectomy            ROS:     General:  No weight loss, fevers, chills, night sweats, fatigue   Eyes:  No vision changes  ENT:  No sore throat, pain, runny nose  CV:  No chest pain, palpitations, dizziness   Resp:  +SOB, +cough, no wheezing  GI:  See HPI  :  No burning with urination, hematuria  Muscle:  No pain, weakness  Neuro:  No weakness/tingling, memory problems  Psych:  No fatigue, insomnia, mood problems, depression  Heme:  No easy bruisability  Skin:  No rash, edema      PHYSICAL EXAM:     GENERAL:  Well developed, no distress  HEENT:  NC/AT,  conjunctivae clear, sclera anicteric  CHEST:  +diffusely rhonchorus breath sounds  HEART:  Regular rhythm & rate  ABDOMEN:  Soft, non-tender, +mildly distended; +BS  EXTREMITIES:  no LE  edema  SKIN:  No rash/erythema/ecchymoses/petechiae/wounds/jaundice  NEURO:  Alert, oriented    Vital Signs:  Vital Signs Last 24 Hrs  T(C): 36.9 (06 Jul 2021 05:50), Max: 36.9 (06 Jul 2021 05:50)  T(F): 98.5 (06 Jul 2021 05:50), Max: 98.5 (06 Jul 2021 05:50)  HR: 86 (06 Jul 2021 07:11) (39 - 100)  BP: 124/62 (06 Jul 2021 05:50) (124/62 - 136/73)  BP(mean): 93 (05 Jul 2021 08:00) (93 - 93)  RR: 18 (06 Jul 2021 05:50) (16 - 18)  SpO2: 98% (06 Jul 2021 07:11) (87% - 98%)  Daily     Daily     LABS:                        12.1   7.32  )-----------( 165      ( 05 Jul 2021 06:19 )             40.0     07-05    138  |  96<L>  |  12  ----------------------------<  128<H>  4.3   |  37<H>  |  0.34<L>    Ca    9.4      05 Jul 2021 06:19  Phos  3.5     07-05  Mg     2.00     07-05                Imaging: No new imaging

## 2021-07-06 NOTE — PROVIDER CONTACT NOTE (HYPOGLYCEMIA EVENT) - NS PROVIDER CONTACT BACKGROUND-HYPO
Age: 66y    Gender: Female    POCT Blood Glucose:  157 mg/dL (07-05-21 @ 12:14)  110 mg/dL (07-05-21 @ 11:40)  48 mg/dL (07-05-21 @ 11:26)  257 mg/dL (07-05-21 @ 07:41)  184 mg/dL (07-04-21 @ 21:07)  142 mg/dL (07-04-21 @ 17:18)      eMAR:  insulin glargine Injectable (LANTUS)   15 Unit(s) SubCutaneous (07-04-21 @ 21:23)    insulin lispro (ADMELOG) corrective regimen sliding scale   6 Unit(s) SubCutaneous (07-05-21 @ 08:10)    insulin lispro Injectable (ADMELOG)   8 Unit(s) SubCutaneous (07-05-21 @ 08:11)   8 Unit(s) SubCutaneous (07-04-21 @ 17:39)    
Age: 66y    Gender: Female    POCT Blood Glucose:  163 mg/dL (07-06-21 @ 12:42)  105 mg/dL (07-06-21 @ 12:20)  65 mg/dL (07-06-21 @ 11:59)  65 mg/dL (07-06-21 @ 11:57)  283 mg/dL (07-06-21 @ 07:50)  167 mg/dL (07-06-21 @ 00:06)  147 mg/dL (07-05-21 @ 22:14)  180 mg/dL (07-05-21 @ 21:04)      eMAR:  insulin glargine Injectable (LANTUS)   15 Unit(s) SubCutaneous (07-05-21 @ 22:14)    insulin lispro (ADMELOG) corrective regimen sliding scale   6 Unit(s) SubCutaneous (07-06-21 @ 07:56)   6 Unit(s) SubCutaneous (07-05-21 @ 17:26)    insulin lispro Injectable (ADMELOG)   8 Unit(s) SubCutaneous (07-06-21 @ 07:54)   8 Unit(s) SubCutaneous (07-05-21 @ 17:27)    Pt had Hypoglycemia episode yesterday 7/5/21 before lunch

## 2021-07-06 NOTE — PROGRESS NOTE ADULT - ASSESSMENT
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 CLD work up previously: HBsAg neg, HbcIgM neg, c Total neg, sAb neg, IgG, IgA, IgM wnl, RUPESH, anti-LKM, AMA negative, Hepatitis A neg, ferritin 169, AFP wnl, ASMA 1:40. MR/MRCP without stricturing or hepatic lesions. No ascites on all imaging and no hx of HE.  - ascites: none  - HE: none  - Varices: unknown, never had EGD  - no other signs of portal HTN; no splenomegaly, normal platelets, no ascites  -     Recommendation:  - given need for evaluation prior to lung transplant eval, will need definitive diagnosis of cirrhosis  - recommend liver biopsy  - pending results of biopsy will be able to further evaluate and manage cirrhosis if present  - rest of care per primary team    Kailey Lewis MD PGY-4  Gastroenterology Fellow  Pager #[phone] ([facility]) or [phone] ([facility])  Available on [employer] Teams.  Please contact on-call GI fellow via answering service ([phone]) after 5pm and before 8am, and on weekends.            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 Hepatology consulted to assist with workup/evaluation of possible underlying cirrhosis in the setting of pending lung transplant evaluation.    Impression:  #Possible cirrhosis- Child Maria Class A; MELD Na 9  - Etiology: unlikely hep C given neg RNA; history of blood transfusion in 1998. 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 CLD work up previously: HBsAg neg, HbcIgM neg, c Total neg, sAb neg, IgG, IgA, IgM wnl, RUPESH, anti-LKM, AMA negative, Hepatitis A neg, ferritin 169, AFP wnl, ASMA 1:40. MR/MRCP without stricturing or hepatic lesions. No ascites on all imaging and no hx of HE.  - ascites: none  - HE: none  - Varices: unknown, never had EGD  - no other signs of portal HTN; no splenomegaly, normal platelets, no ascites    Recommendation:  - given need for evaluation prior to lung transplant eval, will need definitive diagnosis of cirrhosis  - recommend cross sectional abdominal imaging (CT abdomen or MRI abdomen) prior to liver biopsy  - please check IgA, IgM, A1AT if not previously ordered  - rest of care per primary team    Kailey Lewis MD PGY-4  Gastroenterology Fellow  Pager #[phone] ([facility]) or [phone] ([facility])  Available on [employer] Teams.  Please contact on-call GI fellow via answering service ([phone]) after 5pm and before 8am, and on weekends.            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 Hepatology consulted to assist with workup/evaluation of possible underlying cirrhosis in the setting of pending lung transplant evaluation.    Impression:  #Possible cirrhosis- Child Maria Class A; MELD Na 9; FIB4- 1.96  - Etiology: unlikely hep C given neg RNA; history of blood transfusion in 1998. 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 CLD work up previously: HBsAg neg, HbcIgM neg, c Total neg, sAb neg, IgG, IgA, IgM wnl, RUPESH, anti-LKM, AMA negative, Hepatitis A neg, ferritin 169, AFP wnl, ASMA 1:40. MR/MRCP without stricturing or hepatic lesions. No ascites on all imaging and no hx of HE.  - ascites: none  - HE: none  - Varices: unknown, never had EGD  - no other signs of portal HTN; no splenomegaly, normal platelets, no ascites    Recommendation:  - given need for evaluation prior to lung transplant eval, will need definitive diagnosis of cirrhosis  - recommend cross sectional abdominal imaging (CT abdomen or MRI abdomen) prior to liver biopsy  - please check IgA, IgM, A1AT if not previously ordered  - rest of care per primary team    Kailey Lewis MD PGY-4  Gastroenterology Fellow  Pager #[phone] ([facility]) or [phone] ([facility])  Available on [employer] Teams.  Please contact on-call GI fellow via answering service ([phone]) after 5pm and before 8am, and on weekends.            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 Hepatology consulted to assist with workup/evaluation of possible underlying cirrhosis in the setting of pending lung transplant evaluation.    Impression:  #Possible cirrhosis- Child Maria Class A; MELD Na 9; FIB4- 1.96  - Etiology: unlikely hep C given neg RNA; history of blood transfusion in 1998. 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 CLD work up previously: HBsAg neg, HbcIgM neg, c Total neg, sAb neg, IgG, IgA, IgM wnl, RUPESH, anti-LKM, AMA negative, Hepatitis A neg, ferritin 169, AFP wnl, ASMA 1:40. MR/MRCP without stricturing or hepatic lesions. No ascites on all imaging and no hx of HE.  - ascites: none  - HE: none  - Varices: unknown, never had EGD  - no other signs of portal HTN; no splenomegaly, normal platelets, no ascites    Recommendation:  - given need for evaluation prior to lung transplant eval, will need definitive diagnosis of cirrhosis  - recommend cross sectional abdominal imaging (CT abdomen or MRI abdomen) prior to liver biopsy  - please check IgA, IgM, A1AT if not previously ordered  - please order PT/INR, CMP, CBC for AM  - rest of care per primary team    Kailey Lewis MD PGY-4  Gastroenterology Fellow  Pager #[phone] ([facility]) or [phone] ([facility])  Available on [employer] Teams.  Please contact on-call GI fellow via answering service ([phone]) after 5pm and before 8am, and on weekends.            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 Hepatology consulted to assist with workup/evaluation of possible underlying cirrhosis in the setting of pending lung transplant evaluation.    Impression:  #Possible cirrhosis- Child Maria Class A; MELD Na 9; FIB4- 1.96  - Etiology: unlikely hep C given neg RNA; history of blood transfusion in 1998. 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 CLD work up previously: HBsAg neg, HbcIgM neg, c Total neg, sAb neg, IgG, IgA, IgM wnl, RUPESH, anti-LKM, AMA negative, Hepatitis A neg, ferritin 169, AFP wnl, ASMA 1:40. MR/MRCP without stricturing or hepatic lesions. No ascites on all imaging and no hx of HE.  - ascites: none  - HE: none  - Varices: unknown, never had EGD  - no other signs of portal HTN; no splenomegaly, normal platelets, no ascites    Recommendation:  - given need for evaluation prior to lung transplant eval, will need definitive diagnosis of cirrhosis  - recommend cross sectional abdominal imaging (CT abdomen or MRI abdomen) prior to liver biopsy  - please check IgG,  IgA, IgM, A1AT if not previously ordered  - please order PT/INR, CMP, CBC for AM  - rest of care per primary team    Kailey Lewis MD PGY-4  Gastroenterology Fellow  Pager #[phone] ([facility]) or [phone] ([facility])  Available on [employer] Teams.  Please contact on-call GI fellow via answering service ([phone]) after 5pm and before 8am, and on weekends.

## 2021-07-06 NOTE — PROVIDER CONTACT NOTE (HYPOGLYCEMIA EVENT) - NS PROVIDER CONTACT RECOMMEND-HYPO
Gave Apple Juice 4 oz. on July 5, 2021 at 11:28 AM.
Administer 4oz apple juice per hypoglycemia protocol

## 2021-07-06 NOTE — CONSULT NOTE ADULT - CONSULT REASON
Sinusitis
liver biopsy
Pseudomonas Pneumonia
Hypercapnic respiratory failure
cirrhosis, possible lung transplant eval

## 2021-07-06 NOTE — PROVIDER CONTACT NOTE (HYPOGLYCEMIA EVENT) - NS PROVIDER CONTACT SITUATION-HYPO
Pt has BGL of 65
Pt. Diabetic, Finger sticks q AC HS. Received pre meal insulin and sliding scale insulin this AM . Patient ate breakfast.  Lunch time finger stick 48 with a repeat of 69. Gave juice.

## 2021-07-06 NOTE — PROGRESS NOTE ADULT - SUBJECTIVE AND OBJECTIVE BOX
CHIEF COMPLAINT: Patient is a 66y old  Female who presents with a chief complaint of SOB (06 Jul 2021 07:41)    Interval Events:    REVIEW OF SYSTEMS:  Constitutional:   Eyes:  ENT:  CV:  Resp:  GI:  :  MSK:  Integumentary:  Neurological:  Psychiatric:  Endocrine:  Hematologic/Lymphatic:  Allergic/Immunologic:  [ ] All other systems negative  [ ] Unable to assess ROS because ________    OBJECTIVE:  ICU Vital Signs Last 24 Hrs  T(C): 36.9 (06 Jul 2021 05:50), Max: 36.9 (06 Jul 2021 05:50)  T(F): 98.5 (06 Jul 2021 05:50), Max: 98.5 (06 Jul 2021 05:50)  HR: 86 (06 Jul 2021 07:11) (39 - 100)  BP: 124/62 (06 Jul 2021 05:50) (124/62 - 133/74)  BP(mean): --  ABP: --  ABP(mean): --  RR: 18 (06 Jul 2021 05:50) (16 - 18)  SpO2: 98% (06 Jul 2021 07:11) (87% - 98%)    Mode: standby    07-05 @ 07:01  -  07-06 @ 07:00  --------------------------------------------------------  IN: 500 mL / OUT: 0 mL / NET: 500 mL      CAPILLARY BLOOD GLUCOSE  69 (05 Jul 2021 11:27)      POCT Blood Glucose.: 283 mg/dL (06 Jul 2021 07:50)      PHYSICAL EXAM:  General:   HEENT:   Lymph Nodes:  Neck:   Respiratory:   Cardiovascular:   Abdomen:   Extremities:   Skin:   Neurological:  Psychiatry:    HOSPITAL MEDICATIONS:  MEDICATIONS  (STANDING):  albuterol/ipratropium for Nebulization 3 milliLiter(s) Nebulizer every 6 hours  amLODIPine   Tablet 5 milliGRAM(s) Oral daily  ascorbic acid 500 milliGRAM(s) Oral daily  budesonide 160 MICROgram(s)/formoterol 4.5 MICROgram(s) Inhaler 2 Puff(s) Inhalation two times a day  cefepime   IVPB      cefepime   IVPB 2000 milliGRAM(s) IV Intermittent every 12 hours  chlorhexidine 4% Liquid 1 Application(s) Topical daily  dextrose 40% Gel 15 Gram(s) Oral once  dextrose 5%. 1000 milliLiter(s) (50 mL/Hr) IV Continuous <Continuous>  dextrose 5%. 1000 milliLiter(s) (100 mL/Hr) IV Continuous <Continuous>  dextrose 50% Injectable 25 Gram(s) IV Push once  dextrose 50% Injectable 12.5 Gram(s) IV Push once  dextrose 50% Injectable 25 Gram(s) IV Push once  enoxaparin Injectable 40 milliGRAM(s) SubCutaneous daily  famotidine    Tablet 40 milliGRAM(s) Oral at bedtime  ferrous    sulfate 325 milliGRAM(s) Oral daily  fluticasone propionate 50 MICROgram(s)/spray Nasal Spray 2 Spray(s) Both Nostrils two times a day  glucagon  Injectable 1 milliGRAM(s) IntraMuscular once  insulin glargine Injectable (LANTUS) 15 Unit(s) SubCutaneous at bedtime  insulin lispro (ADMELOG) corrective regimen sliding scale   SubCutaneous three times a day before meals  insulin lispro (ADMELOG) corrective regimen sliding scale   SubCutaneous at bedtime  insulin lispro Injectable (ADMELOG) 8 Unit(s) SubCutaneous three times a day before meals  mirtazapine 7.5 milliGRAM(s) Oral at bedtime  montelukast 10 milliGRAM(s) Oral daily  pantoprazole    Tablet 40 milliGRAM(s) Oral before breakfast  polyethylene glycol 3350 17 Gram(s) Oral daily  senna 2 Tablet(s) Oral at bedtime  sodium chloride 0.65% Nasal 2 Spray(s) Both Nostrils every 12 hours    MEDICATIONS  (PRN):  acetaminophen   Tablet .. 650 milliGRAM(s) Oral every 4 hours PRN Mild Pain (1 - 3)  guaiFENesin Oral Liquid (Sugar-Free) 100 milliGRAM(s) Oral every 6 hours PRN Cough      LABS:                        12.1   7.32  )-----------( 165      ( 05 Jul 2021 06:19 )             40.0     07-05    138  |  96<L>  |  12  ----------------------------<  128<H>  4.3   |  37<H>  |  0.34<L>    Ca    9.4      05 Jul 2021 06:19  Phos  3.5     07-05  Mg     2.00     07-05            Venous Blood Gas:  07-05 @ 06:19  7.37/75/60/38/91.7  VBG Lactate: 0.9      MICROBIOLOGY:     RADIOLOGY:  [ ] Reviewed and interpreted by me    PULMONARY FUNCTION TESTS:    EKG: CHIEF COMPLAINT: Patient is a 66y old  Female who presents with a chief complaint of SOB (06 Jul 2021 07:41)    Interval Events: Pt removed AVAPs at night for about 1 hr s/p cough/mucus. Otherwise no new complaints. Pt seen and examined at bedside. No new complaints. Plan for Liver bx tomorrow morning with IR. Will c/w current Abx for one more week. VSS, and medications reviewed.     REVIEW OF SYSTEMS:  see above  [x] All other systems negative    OBJECTIVE:  ICU Vital Signs Last 24 Hrs  T(C): 36.9 (06 Jul 2021 05:50), Max: 36.9 (06 Jul 2021 05:50)  T(F): 98.5 (06 Jul 2021 05:50), Max: 98.5 (06 Jul 2021 05:50)  HR: 86 (06 Jul 2021 07:11) (39 - 100)  BP: 124/62 (06 Jul 2021 05:50) (124/62 - 133/74)  BP(mean): --  ABP: --  ABP(mean): --  RR: 18 (06 Jul 2021 05:50) (16 - 18)  SpO2: 98% (06 Jul 2021 07:11) (87% - 98%)    Mode: standby    07-05 @ 07:01  -  07-06 @ 07:00  --------------------------------------------------------  IN: 500 mL / OUT: 0 mL / NET: 500 mL      CAPILLARY BLOOD GLUCOSE  69 (05 Jul 2021 11:27)      POCT Blood Glucose.: 283 mg/dL (06 Jul 2021 07:50)      PHYSICAL EXAM  General: NAD, laying comfortably in bed  Neck: no JVD, supple  Respiratory: decreased air entry b/l  Cardiovascular: +s1, s2  Abdomen: +BS, soft, NT/ND, no peritoneal signs noted  Extremities: No calf tenderness  Skin: as per RN assessment sheet   Neurological: non focal     HOSPITAL MEDICATIONS:  MEDICATIONS  (STANDING):  albuterol/ipratropium for Nebulization 3 milliLiter(s) Nebulizer every 6 hours  amLODIPine   Tablet 5 milliGRAM(s) Oral daily  ascorbic acid 500 milliGRAM(s) Oral daily  budesonide 160 MICROgram(s)/formoterol 4.5 MICROgram(s) Inhaler 2 Puff(s) Inhalation two times a day  cefepime   IVPB      cefepime   IVPB 2000 milliGRAM(s) IV Intermittent every 12 hours  chlorhexidine 4% Liquid 1 Application(s) Topical daily  dextrose 40% Gel 15 Gram(s) Oral once  dextrose 5%. 1000 milliLiter(s) (50 mL/Hr) IV Continuous <Continuous>  dextrose 5%. 1000 milliLiter(s) (100 mL/Hr) IV Continuous <Continuous>  dextrose 50% Injectable 25 Gram(s) IV Push once  dextrose 50% Injectable 12.5 Gram(s) IV Push once  dextrose 50% Injectable 25 Gram(s) IV Push once  enoxaparin Injectable 40 milliGRAM(s) SubCutaneous daily  famotidine    Tablet 40 milliGRAM(s) Oral at bedtime  ferrous    sulfate 325 milliGRAM(s) Oral daily  fluticasone propionate 50 MICROgram(s)/spray Nasal Spray 2 Spray(s) Both Nostrils two times a day  glucagon  Injectable 1 milliGRAM(s) IntraMuscular once  insulin glargine Injectable (LANTUS) 15 Unit(s) SubCutaneous at bedtime  insulin lispro (ADMELOG) corrective regimen sliding scale   SubCutaneous three times a day before meals  insulin lispro (ADMELOG) corrective regimen sliding scale   SubCutaneous at bedtime  insulin lispro Injectable (ADMELOG) 8 Unit(s) SubCutaneous three times a day before meals  mirtazapine 7.5 milliGRAM(s) Oral at bedtime  montelukast 10 milliGRAM(s) Oral daily  pantoprazole    Tablet 40 milliGRAM(s) Oral before breakfast  polyethylene glycol 3350 17 Gram(s) Oral daily  senna 2 Tablet(s) Oral at bedtime  sodium chloride 0.65% Nasal 2 Spray(s) Both Nostrils every 12 hours    MEDICATIONS  (PRN):  acetaminophen   Tablet .. 650 milliGRAM(s) Oral every 4 hours PRN Mild Pain (1 - 3)  guaiFENesin Oral Liquid (Sugar-Free) 100 milliGRAM(s) Oral every 6 hours PRN Cough      LABS:                        12.1   7.32  )-----------( 165      ( 05 Jul 2021 06:19 )             40.0     07-05    138  |  96<L>  |  12  ----------------------------<  128<H>  4.3   |  37<H>  |  0.34<L>    Ca    9.4      05 Jul 2021 06:19  Phos  3.5     07-05  Mg     2.00     07-05      Venous Blood Gas:  07-05 @ 06:19  7.37/75/60/38/91.7  VBG Lactate: 0.9      MICROBIOLOGY:     RADIOLOGY:  [ ] Reviewed and interpreted by me    PULMONARY FUNCTION TESTS:    EKG:

## 2021-07-06 NOTE — PROVIDER CONTACT NOTE (HYPOGLYCEMIA EVENT) - NS PROVIDER CONTACT NOTE-TREATMENT-HYPO
4oz apple juice given at 1200/4 oz Fruit Juice (Specify quantity, date/time)
4 oz Fruit Juice (Specify quantity, date/time)

## 2021-07-06 NOTE — PROVIDER CONTACT NOTE (HYPOGLYCEMIA EVENT) - NS PROVIDER CONTACT ASSESS-HYPO
Pt stable; no signs or symptoms of hypoglycemia
Pt. blood sugar low, no complaints of side effects of hypoglycemia.

## 2021-07-06 NOTE — CHART NOTE - NSCHARTNOTEFT_GEN_A_CORE
Patient Age: 65 y/o    Patient Gender: Female    Procedure (including site/side if known): Percutaneous Liver Biopsy    Diagnosis/Indication: Liver Cirrhosis of unclear etiology     Interventional Radiology Attending Physician: Jemma Hoang    Ordering Attending Physician: Dr. Lisker     Pertinent medical history: hx of Cystic bronchiectasis, IDDMII, HTN, Primary Ciliary Dyskinesia on 2LNC    Pertinent Labs:                        12.1   7.32  )-----------( 165      ( 05 Jul 2021 06:19 )             40.0     07-05    138  |  96<L>  |  12  ----------------------------<  128<H>  4.3   |  37<H>  |  0.34<L>    Ca    9.4      05 Jul 2021 06:19  Phos  3.5     07-05  Mg     2.00     07-05      Patient and Family aware? Yes      Attending/Resident/NP/PA : Dr. Lisker/Nito VERA

## 2021-07-06 NOTE — PROGRESS NOTE ADULT - ATTENDING COMMENTS
agree with above  bronchiectasis exacerbation, pseudomonas, cont cefepime  nocturnal NIV  hypersal, duoneb, chest vest, aerobika  ENT appreciated, sinus MRI pending  hepatology appreciated, d/w IR for bx

## 2021-07-07 ENCOUNTER — RESULT REVIEW (OUTPATIENT)
Age: 66
End: 2021-07-07

## 2021-07-07 LAB
ANION GAP SERPL CALC-SCNC: 6 MMOL/L — LOW (ref 7–14)
APTT BLD: 27.2 SEC — SIGNIFICANT CHANGE UP (ref 27–36.3)
BLD GP AB SCN SERPL QL: NEGATIVE — SIGNIFICANT CHANGE UP
BUN SERPL-MCNC: 16 MG/DL — SIGNIFICANT CHANGE UP (ref 7–23)
CALCIUM SERPL-MCNC: 9.2 MG/DL — SIGNIFICANT CHANGE UP (ref 8.4–10.5)
CHLORIDE SERPL-SCNC: 100 MMOL/L — SIGNIFICANT CHANGE UP (ref 98–107)
CO2 SERPL-SCNC: 34 MMOL/L — HIGH (ref 22–31)
CREAT SERPL-MCNC: 0.37 MG/DL — LOW (ref 0.5–1.3)
GLUCOSE BLDC GLUCOMTR-MCNC: 123 MG/DL — HIGH (ref 70–99)
GLUCOSE BLDC GLUCOMTR-MCNC: 134 MG/DL — HIGH (ref 70–99)
GLUCOSE BLDC GLUCOMTR-MCNC: 164 MG/DL — HIGH (ref 70–99)
GLUCOSE BLDC GLUCOMTR-MCNC: 321 MG/DL — HIGH (ref 70–99)
GLUCOSE BLDC GLUCOMTR-MCNC: 84 MG/DL — SIGNIFICANT CHANGE UP (ref 70–99)
GLUCOSE SERPL-MCNC: 146 MG/DL — HIGH (ref 70–99)
HCT VFR BLD CALC: 38.4 % — SIGNIFICANT CHANGE UP (ref 34.5–45)
HGB BLD-MCNC: 11.7 G/DL — SIGNIFICANT CHANGE UP (ref 11.5–15.5)
INR BLD: 0.95 RATIO — SIGNIFICANT CHANGE UP (ref 0.88–1.16)
MAGNESIUM SERPL-MCNC: 1.9 MG/DL — SIGNIFICANT CHANGE UP (ref 1.6–2.6)
MCHC RBC-ENTMCNC: 27.3 PG — SIGNIFICANT CHANGE UP (ref 27–34)
MCHC RBC-ENTMCNC: 30.5 GM/DL — LOW (ref 32–36)
MCV RBC AUTO: 89.5 FL — SIGNIFICANT CHANGE UP (ref 80–100)
NRBC # BLD: 0 /100 WBCS — SIGNIFICANT CHANGE UP
NRBC # FLD: 0 K/UL — SIGNIFICANT CHANGE UP
PHOSPHATE SERPL-MCNC: 3.3 MG/DL — SIGNIFICANT CHANGE UP (ref 2.5–4.5)
PLATELET # BLD AUTO: 142 K/UL — LOW (ref 150–400)
POTASSIUM SERPL-MCNC: 4.2 MMOL/L — SIGNIFICANT CHANGE UP (ref 3.5–5.3)
POTASSIUM SERPL-SCNC: 4.2 MMOL/L — SIGNIFICANT CHANGE UP (ref 3.5–5.3)
PROTHROM AB SERPL-ACNC: 10.9 SEC — SIGNIFICANT CHANGE UP (ref 10.6–13.6)
RBC # BLD: 4.29 M/UL — SIGNIFICANT CHANGE UP (ref 3.8–5.2)
RBC # FLD: 13.6 % — SIGNIFICANT CHANGE UP (ref 10.3–14.5)
RH IG SCN BLD-IMP: POSITIVE — SIGNIFICANT CHANGE UP
SODIUM SERPL-SCNC: 140 MMOL/L — SIGNIFICANT CHANGE UP (ref 135–145)
WBC # BLD: 7.8 K/UL — SIGNIFICANT CHANGE UP (ref 3.8–10.5)
WBC # FLD AUTO: 7.8 K/UL — SIGNIFICANT CHANGE UP (ref 3.8–10.5)

## 2021-07-07 PROCEDURE — 99232 SBSQ HOSP IP/OBS MODERATE 35: CPT | Mod: GC

## 2021-07-07 PROCEDURE — 99233 SBSQ HOSP IP/OBS HIGH 50: CPT | Mod: GC

## 2021-07-07 PROCEDURE — 88313 SPECIAL STAINS GROUP 2: CPT | Mod: 26

## 2021-07-07 PROCEDURE — 75970 VASCULAR BIOPSY: CPT | Mod: 26

## 2021-07-07 PROCEDURE — 76937 US GUIDE VASCULAR ACCESS: CPT | Mod: 26

## 2021-07-07 PROCEDURE — 37200 TRANSCATHETER BIOPSY: CPT

## 2021-07-07 PROCEDURE — 88307 TISSUE EXAM BY PATHOLOGIST: CPT | Mod: 26

## 2021-07-07 PROCEDURE — 36011 PLACE CATHETER IN VEIN: CPT

## 2021-07-07 RX ORDER — CEFEPIME 1 G/1
2000 INJECTION, POWDER, FOR SOLUTION INTRAMUSCULAR; INTRAVENOUS EVERY 12 HOURS
Refills: 0 | Status: COMPLETED | OUTPATIENT
Start: 2021-07-07 | End: 2021-07-13

## 2021-07-07 RX ORDER — CEFEPIME 1 G/1
2000 INJECTION, POWDER, FOR SOLUTION INTRAMUSCULAR; INTRAVENOUS EVERY 8 HOURS
Refills: 0 | Status: DISCONTINUED | OUTPATIENT
Start: 2021-07-07 | End: 2021-07-07

## 2021-07-07 RX ORDER — ENOXAPARIN SODIUM 100 MG/ML
40 INJECTION SUBCUTANEOUS DAILY
Refills: 0 | Status: DISCONTINUED | OUTPATIENT
Start: 2021-07-08 | End: 2021-07-19

## 2021-07-07 RX ORDER — INSULIN GLARGINE 100 [IU]/ML
15 INJECTION, SOLUTION SUBCUTANEOUS AT BEDTIME
Refills: 0 | Status: DISCONTINUED | OUTPATIENT
Start: 2021-07-07 | End: 2021-07-19

## 2021-07-07 RX ADMIN — CHLORHEXIDINE GLUCONATE 1 APPLICATION(S): 213 SOLUTION TOPICAL at 15:38

## 2021-07-07 RX ADMIN — Medication 8 UNIT(S): at 17:19

## 2021-07-07 RX ADMIN — SODIUM CHLORIDE 3 MILLILITER(S): 9 INJECTION INTRAMUSCULAR; INTRAVENOUS; SUBCUTANEOUS at 22:31

## 2021-07-07 RX ADMIN — INSULIN GLARGINE 15 UNIT(S): 100 INJECTION, SOLUTION SUBCUTANEOUS at 22:03

## 2021-07-07 RX ADMIN — Medication 2 SPRAY(S): at 17:24

## 2021-07-07 RX ADMIN — SODIUM CHLORIDE 3 MILLILITER(S): 9 INJECTION INTRAMUSCULAR; INTRAVENOUS; SUBCUTANEOUS at 15:22

## 2021-07-07 RX ADMIN — BUDESONIDE AND FORMOTEROL FUMARATE DIHYDRATE 2 PUFF(S): 160; 4.5 AEROSOL RESPIRATORY (INHALATION) at 09:12

## 2021-07-07 RX ADMIN — Medication 650 MILLIGRAM(S): at 14:54

## 2021-07-07 RX ADMIN — Medication 2 SPRAY(S): at 05:02

## 2021-07-07 RX ADMIN — POLYETHYLENE GLYCOL 3350 17 GRAM(S): 17 POWDER, FOR SOLUTION ORAL at 17:28

## 2021-07-07 RX ADMIN — Medication 3 MILLILITER(S): at 09:12

## 2021-07-07 RX ADMIN — Medication 4: at 22:04

## 2021-07-07 RX ADMIN — CEFEPIME 100 MILLIGRAM(S): 1 INJECTION, POWDER, FOR SOLUTION INTRAMUSCULAR; INTRAVENOUS at 17:29

## 2021-07-07 RX ADMIN — PANTOPRAZOLE SODIUM 40 MILLIGRAM(S): 20 TABLET, DELAYED RELEASE ORAL at 05:03

## 2021-07-07 RX ADMIN — Medication 3 MILLILITER(S): at 22:30

## 2021-07-07 RX ADMIN — SODIUM CHLORIDE 3 MILLILITER(S): 9 INJECTION INTRAMUSCULAR; INTRAVENOUS; SUBCUTANEOUS at 09:12

## 2021-07-07 RX ADMIN — SODIUM CHLORIDE 3 MILLILITER(S): 9 INJECTION INTRAMUSCULAR; INTRAVENOUS; SUBCUTANEOUS at 03:52

## 2021-07-07 RX ADMIN — Medication 325 MILLIGRAM(S): at 15:37

## 2021-07-07 RX ADMIN — CEFEPIME 100 MILLIGRAM(S): 1 INJECTION, POWDER, FOR SOLUTION INTRAMUSCULAR; INTRAVENOUS at 05:01

## 2021-07-07 RX ADMIN — MIRTAZAPINE 7.5 MILLIGRAM(S): 45 TABLET, ORALLY DISINTEGRATING ORAL at 22:08

## 2021-07-07 RX ADMIN — FAMOTIDINE 40 MILLIGRAM(S): 10 INJECTION INTRAVENOUS at 22:10

## 2021-07-07 RX ADMIN — BUDESONIDE AND FORMOTEROL FUMARATE DIHYDRATE 2 PUFF(S): 160; 4.5 AEROSOL RESPIRATORY (INHALATION) at 22:34

## 2021-07-07 RX ADMIN — SENNA PLUS 2 TABLET(S): 8.6 TABLET ORAL at 22:08

## 2021-07-07 RX ADMIN — Medication 2: at 17:20

## 2021-07-07 RX ADMIN — AMLODIPINE BESYLATE 5 MILLIGRAM(S): 2.5 TABLET ORAL at 05:02

## 2021-07-07 RX ADMIN — MONTELUKAST 10 MILLIGRAM(S): 4 TABLET, CHEWABLE ORAL at 15:37

## 2021-07-07 RX ADMIN — Medication 500 MILLIGRAM(S): at 15:37

## 2021-07-07 RX ADMIN — Medication 3 MILLILITER(S): at 15:22

## 2021-07-07 RX ADMIN — Medication 3 MILLILITER(S): at 03:52

## 2021-07-07 NOTE — PROGRESS NOTE ADULT - ASSESSMENT
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 Hepatology consulted to assist with workup/evaluation of possible underlying cirrhosis in the setting of pending lung transplant evaluation. Per MRCP from 04/2021, pt without stricturing or hepatic lesions. Workup for cirrhosis so far was notable for positive ASMA of 1:40, Child Maria Class A; MELD Na 9; FIB4- 1.96 which suggested unlikely advanced fibrosis, normal PLT, (She had positive anti_HCV but negative HCV PCR, no evidence of active or chronic HCV). Was never treated for chronic HCV. s/p transjugular liver bx by IR on 7/7 with 3 mmHg wedged - RA gradient; 2 core specimens were obtained. Pending pathology results and repeat abdominal imaging.    Impression:  #Possible cirrhosis- Child Maria Class A; MELD Na 9; FIB4- 1.96  - Etiology: unlikely hep C given neg RNA; history of blood transfusion in 1998. 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 CLD work up previously: HBsAg neg, HbcIgM neg, c Total neg, sAb neg, IgG, IgA, IgM wnl, RUPESH, anti-LKM, AMA negative, Hepatitis A neg, ferritin 169, AFP wnl, ASMA 1:40. MR/MRCP without stricturing or hepatic lesions. No ascites on all imaging and no hx of HE.  - ascites: none  - HE: none  - Varices: unknown, never had EGD  - no other signs of portal HTN; no splenomegaly, normal platelets, no ascites    Recommendations:  - given need for evaluation prior to lung transplant eval, will need definitive diagnosis of cirrhosis  - f/u pathology from [facility] liver bx 7/7  - recommend repeat cross sectional abdominal imaging preferably with MRCP to evaluate bile ducts given h/o CBD stricturing  - f/u IgG, IgA, IgM, A1AT   - f/u daily PT/INR, CMP, CBC for AM  - rest of care per primary team    Kailey Lewis MD PGY-4  Gastroenterology Fellow  Pager #[phone] ([facility]) or [phone] ([facility])  Available on [employer] Teams.  Please contact on-call GI fellow via answering service ([phone]) after 5pm and before 8am, and on weekends.

## 2021-07-07 NOTE — PROGRESS NOTE ADULT - ASSESSMENT
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 Patient now presented from Pulmonary Office, Dr. Young for worsening SOB, lethargy and cough with thick yellow secretions. Now readmitted for acute on chronic hypercarbic respiratory failure likely in setting of bronchiectasis exacerbation with recurrent PNA.     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   - CT CHEST 6/30 with (+) bronchiectasis   - SCx 6/30 with Pseudomonas Aeurginosa  - Continue on aggressive chest PT with Mucomyst, Duoneb and Chest Vest Q6H   - Continue on Symbicort BID and Singular QD   - s/p Prednisone 40mg QD (6/30 - 7/3)   - s/p Avycaz/ NELLY (6/30 - 7/2) and c/w Cefepime (7/2- until 7/14)   - Hypercarbia improved and now continued on NC QD and AVAPs QHS.   - Will need home NIV set up prior to DC and f/u with Dr. Young outpatient for possible Lung transplant    # Acute on Chronic Sinusitis   - CT SINUS 6/30 with extensive inflammatory changes throughout the paranasal sinuses and their respective drainage pathways, , improved in the right frontal and sphenoid sinuses since comparison. 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 Cannot rule out CSF leak. Thinning of the left carotid canal wall along the posterior left sphenoid sinus. Cannot rule out dehiscence.  - Continue on Flonase and [place] BID   - MRI Sinuses pending   - ENT following    # Nodular Liver with possible Cirrhosis ?   - During admission in April patient was found to have nodular contour of liver on US 4/6/2021. Upon further discussion with patient and Hepatology, she was noted to have history of blood transfusion in 1998 with (+) Hep C AB with negative Hep C RNA. She also reports history of CBD stricture in 1998 in [place], s/p PTC/PTBD.   - CLD work up previously noted with HBsAg negative, HBcIgM negative, Hep E IgG negative, Hep A IgM negative, RUPESH, Anti-LKM and AMA negative, Ferritin 169, AFP within normal limits, ASMA 1:40. MR/MRCP without stricturing or hepatic lesions. No ascites on all imaging and no history of HE. No known varices/ no EGD record on record.   - Echo - EF 73%, unremarkable   - Hepatology consulted, recs apreciated  - Plan for Liver Bx on 7/7 - NPO p MN, hold AC for now, Routine labs for the morning    # IDDM2, A1C 8.9 (6/2021)   - At home on: Lantus 14U QHS and sliding scale  - In house on: Lantus 15U QHS and Lispro 8U + Moderate ISS in house while on steroids.   - Pt with episode of hypoglycemia at lunch today, per nursing staff pt did eat her breakfast this am  - Will trend FS: adjustments will be made after evaluating dinner time FS if warranted     # Ethics/ DISPO/ GOC  - FULL CODE and GOC discussed with Son, Michela ([phone]) who expresses wishes of DNI, however will discuss further for DNR. Family wishes for all medical treatment (including invasive and possible lung transplant) to be performed, however if her condition was to worsen they do not wish for intubation. GOC conversation ongoing.   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 Patient now presented from Pulmonary Office, Dr. Young for worsening SOB, lethargy and cough with thick yellow secretions. Now readmitted for acute on chronic hypercarbic respiratory failure likely in setting of bronchiectasis exacerbation with recurrent PNA.     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   - CT CHEST 6/30 with (+) bronchiectasis   - SCx 6/30 with Pseudomonas Aeurginosa  - Continue on aggressive chest PT with Mucomyst, Duoneb and Chest Vest Q6H   - Continue on Symbicort BID and Singular QD   - s/p Prednisone 40mg QD (6/30 - 7/3)   - s/p Avycaz/ NELLY (6/30 - 7/2) and c/w Cefepime (7/2- until 7/14)   - Hypercarbia improved and now continued on NC QD and AVAPs QHS.   - Will need home NIV set up prior to DC and f/u with Dr. Young outpatient for possible Lung transplant    # Acute on Chronic Sinusitis   - CT SINUS 6/30 with extensive inflammatory changes throughout the paranasal sinuses and their respective drainage pathways, , improved in the right frontal and sphenoid sinuses since comparison. 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 Cannot rule out CSF leak. Thinning of the left carotid canal wall along the posterior left sphenoid sinus. Cannot rule out dehiscence.  - Continue on Flonase and [place] BID   - MRI Sinuses pending   - ENT following    # Nodular Liver with possible Cirrhosis ?   - During admission in April patient was found to have nodular contour of liver on US 4/6/2021. Upon further discussion with patient and Hepatology, she was noted to have history of blood transfusion in 1998 with (+) Hep C AB with negative Hep C RNA. She also reports history of CBD stricture in 1998 in [place], s/p PTC/PTBD.   - CLD work up previously noted with HBsAg negative, HBcIgM negative, Hep E IgG negative, Hep A IgM negative, RUPESH, Anti-LKM and AMA negative, Ferritin 169, AFP within normal limits, ASMA 1:40. MR/MRCP without stricturing or hepatic lesions. No ascites on all imaging and no history of HE. No known varices/ no EGD record on record.   - Echo - EF 73%, unremarkable   - Hepatology consulted, recs apreciated  - S/P liver bx 7/7; f/u results    # IDDM2, A1C 8.9 (6/2021)   - At home on: Lantus 14U QHS and sliding scale  - In house on: Lantus 15U QHS and Lispro 8U + Moderate ISS in house while on steroids.   - Pt with episode of hypoglycemia at lunch today, per nursing staff pt did eat her breakfast this am  - Will trend FS: adjustments will be made after evaluating dinner time FS if warranted     # Ethics/ DISPO/ GOC  - FULL CODE and GOC discussed with Son, Michela ([phone]) who expresses wishes of DNI, however will discuss further for DNR. Family wishes for all medical treatment (including invasive and possible lung transplant) to be performed, however if her condition was to worsen they do not wish for intubation. GOC conversation ongoing.   - PT recommends home with home PT   - Plan of care discussed with tabitha Rodrigues via phone while at bedside today 7/5     # DVT PPX -Restart tomorrow lovenox

## 2021-07-07 NOTE — PROGRESS NOTE ADULT - SUBJECTIVE AND OBJECTIVE BOX
Chief Complaint:  Patient is a 66y old  Female who presents with a chief complaint of SOB (2021 07:12)      Interval Events: Still reporting persistent cough. No other complaints this AM. Underwent IR TJ liver bx today by IR.      Hospital Medications:  acetaminophen   Tablet .. 650 milliGRAM(s) Oral every 4 hours PRN  albuterol/ipratropium for Nebulization 3 milliLiter(s) Nebulizer every 6 hours  amLODIPine   Tablet 5 milliGRAM(s) Oral daily  ascorbic acid 500 milliGRAM(s) Oral daily  budesonide 160 MICROgram(s)/formoterol 4.5 MICROgram(s) Inhaler 2 Puff(s) Inhalation two times a day  cefepime   IVPB 2000 milliGRAM(s) IV Intermittent every 12 hours  chlorhexidine 4% Liquid 1 Application(s) Topical daily  dextrose 40% Gel 15 Gram(s) Oral once  dextrose 5%. 1000 milliLiter(s) IV Continuous <Continuous>  dextrose 5%. 1000 milliLiter(s) IV Continuous <Continuous>  dextrose 50% Injectable 25 Gram(s) IV Push once  dextrose 50% Injectable 12.5 Gram(s) IV Push once  dextrose 50% Injectable 25 Gram(s) IV Push once  famotidine    Tablet 40 milliGRAM(s) Oral at bedtime  ferrous    sulfate 325 milliGRAM(s) Oral daily  fluticasone propionate 50 MICROgram(s)/spray Nasal Spray 2 Spray(s) Both Nostrils two times a day  glucagon  Injectable 1 milliGRAM(s) IntraMuscular once  guaiFENesin Oral Liquid (Sugar-Free) 100 milliGRAM(s) Oral every 6 hours PRN  insulin lispro (ADMELOG) corrective regimen sliding scale   SubCutaneous three times a day before meals  insulin lispro (ADMELOG) corrective regimen sliding scale   SubCutaneous at bedtime  insulin lispro Injectable (ADMELOG) 8 Unit(s) SubCutaneous three times a day before meals  mirtazapine 7.5 milliGRAM(s) Oral at bedtime  montelukast 10 milliGRAM(s) Oral daily  pantoprazole    Tablet 40 milliGRAM(s) Oral before breakfast  polyethylene glycol 3350 17 Gram(s) Oral daily  senna 2 Tablet(s) Oral at bedtime  sodium chloride 0.65% Nasal 2 Spray(s) Both Nostrils every 12 hours  sodium chloride 3%  Inhalation 3 milliLiter(s) Inhalation every 6 hours      PMHX/PSHX:  DM (diabetes mellitus)    Bronchitis    ABPA (allergic bronchopulmonary aspergillosis)    Bronchiectasis    Asthma    Hypertension    Vitamin D deficiency    Microcytic anemia    GERD (gastroesophageal reflux disease)    Postnasal drip    Pseudomonas aeruginosa colonization    Allergic rhinitis    Recurrent pneumonia    Type 2 diabetes mellitus, with long-term current use of insulin    History of cholecystectomy            ROS: 10 point ROS negative unless otherwise stated in HPI      PHYSICAL EXAM: limited as pt was in IR suite for bx    Gen: AA, NAD  HEENT: +NC in place  Resp: symmetric expansion, NWOB  Ext: No BL LE edema    Vital Signs:  Vital Signs Last 24 Hrs  T(C): 36.4 (2021 04:50), Max: 36.6 (2021 21:56)  T(F): 97.5 (2021 04:50), Max: 97.9 (2021 21:56)  HR: 98 (2021 12:05) (77 - 101)  BP: 157/74 (2021 04:50) (145/68 - 157/74)  BP(mean): --  RR: 19 (2021 04:50) (19 - 19)  SpO2: 99% (2021 10:04) (91% - 100%)  Daily     Daily Weight in k.1 (2021 05:58)    LABS:                        11.7   7.80  )-----------( 142      ( 2021 06:46 )             38.4     07-07    140  |  100  |  16  ----------------------------<  146<H>  4.2   |  34<H>  |  0.37<L>    Ca    9.2      2021 06:46  Phos  3.3     07-07  Mg     1.90     07-07        PT/INR - ( 2021 06:46 )   PT: 10.9 sec;   INR: 0.95 ratio         PTT - ( 2021 06:46 )  PTT:27.2 sec

## 2021-07-07 NOTE — PROGRESS NOTE ADULT - PROBLEM SELECTOR PLAN 3
Per pt, on Basaglar 10u qhs and Admelog 2u qAC at home -->   - lantus decreased back to 22 units, and premeal 5 units tid  - cont to monitor and adjust insulin prn Patient requests all Lab, Cardiology, and Radiology Results on their Discharge Instructions

## 2021-07-07 NOTE — PROGRESS NOTE ADULT - ATTENDING COMMENTS
agree with above  plan for liver bx and pressures  on cefepime, can change to po levaquin on d/c    Dhronic respiratory failure with hypoxia and hypercapnea secondary to copd. Not adequately treated with bilevel. Doing well with AVAPS - plan for d/c with AVAPS to improve respiratory mechanics

## 2021-07-07 NOTE — PROGRESS NOTE ADULT - ATTENDING COMMENTS
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   Patient has chronic cough but denies nausea, vomiting or abdominal pain.   Abdominal MRI with Gadavist from 4/2021 reported cirrhosis, normal spleen, no ascites s/p cholecystectomy and pneumobilia. Workup for cirrhosis so far was notable for positive ASMA of 1:40, No recent liver tests, but normal INR, and mild thrombocytopenia.   Patient has TJ biopsy and HVPG pressure measurement today.   A/P- nodular liver, possible burned out AIH with cirrhosis, or NRH if cirrhosis is ruled out.   Would recommend- workup for chronic liver disease such as  A1AT, IgG, A and M, repeat MRCP to rule out biliary stricture, a TJ liver biopsy with HVPG pressure measurement to determine cirrhosis and portal hypertension, and continue care per primary team.

## 2021-07-07 NOTE — PROCEDURE NOTE - PROCEDURE FINDINGS AND DETAILS
Transjugular liver biopsy performed. 3mmHg wedged - RA gradient. 2 core specimens obtained.   Pt tolerated procedure without difficulty. Full report to follow.

## 2021-07-07 NOTE — PROGRESS NOTE ADULT - SUBJECTIVE AND OBJECTIVE BOX
CHIEF COMPLAINT: Patient is a 66y old  Female who presents with a chief complaint of SOB.    Interval Events:    REVIEW OF SYSTEMS:  [ ] All other systems negative  [ ] Unable to assess ROS because ________    OBJECTIVE:  ICU Vital Signs Last 24 Hrs  T(C): 36.4 (07 Jul 2021 04:50), Max: 36.9 (06 Jul 2021 13:15)  T(F): 97.5 (07 Jul 2021 04:50), Max: 98.5 (06 Jul 2021 13:15)  HR: 81 (07 Jul 2021 06:57) (77 - 101)  BP: 157/74 (07 Jul 2021 04:50) (144/66 - 157/74)  RR: 19 (07 Jul 2021 04:50) (19 - 19)  SpO2: 100% (07 Jul 2021 06:57) (91% - 100%)    Mode: standby    07-06 @ 07:01  -  07-07 @ 07:00  --------------------------------------------------------  IN: 600 mL / OUT: 0 mL / NET: 600 mL      CAPILLARY BLOOD GLUCOSE  69 (05 Jul 2021 11:27)      POCT Blood Glucose.: 122 mg/dL (06 Jul 2021 21:35)      HOSPITAL MEDICATIONS:  MEDICATIONS  (STANDING):  albuterol/ipratropium for Nebulization 3 milliLiter(s) Nebulizer every 6 hours  amLODIPine   Tablet 5 milliGRAM(s) Oral daily  ascorbic acid 500 milliGRAM(s) Oral daily  budesonide 160 MICROgram(s)/formoterol 4.5 MICROgram(s) Inhaler 2 Puff(s) Inhalation two times a day  cefepime   IVPB 2000 milliGRAM(s) IV Intermittent every 12 hours  chlorhexidine 4% Liquid 1 Application(s) Topical daily  dextrose 40% Gel 15 Gram(s) Oral once  dextrose 5%. 1000 milliLiter(s) (50 mL/Hr) IV Continuous <Continuous>  dextrose 5%. 1000 milliLiter(s) (100 mL/Hr) IV Continuous <Continuous>  dextrose 50% Injectable 25 Gram(s) IV Push once  dextrose 50% Injectable 12.5 Gram(s) IV Push once  dextrose 50% Injectable 25 Gram(s) IV Push once  famotidine    Tablet 40 milliGRAM(s) Oral at bedtime  ferrous    sulfate 325 milliGRAM(s) Oral daily  fluticasone propionate 50 MICROgram(s)/spray Nasal Spray 2 Spray(s) Both Nostrils two times a day  glucagon  Injectable 1 milliGRAM(s) IntraMuscular once  insulin lispro (ADMELOG) corrective regimen sliding scale   SubCutaneous three times a day before meals  insulin lispro (ADMELOG) corrective regimen sliding scale   SubCutaneous at bedtime  insulin lispro Injectable (ADMELOG) 8 Unit(s) SubCutaneous three times a day before meals  mirtazapine 7.5 milliGRAM(s) Oral at bedtime  montelukast 10 milliGRAM(s) Oral daily  pantoprazole    Tablet 40 milliGRAM(s) Oral before breakfast  polyethylene glycol 3350 17 Gram(s) Oral daily  senna 2 Tablet(s) Oral at bedtime  sodium chloride 0.65% Nasal 2 Spray(s) Both Nostrils every 12 hours  sodium chloride 3%  Inhalation 3 milliLiter(s) Inhalation every 6 hours    MEDICATIONS  (PRN):  acetaminophen   Tablet .. 650 milliGRAM(s) Oral every 4 hours PRN Mild Pain (1 - 3)  guaiFENesin Oral Liquid (Sugar-Free) 100 milliGRAM(s) Oral every 6 hours PRN Cough      LABS:                        11.7   7.80  )-----------( 142      ( 07 Jul 2021 06:46 )             38.4       PT/INR - ( 07 Jul 2021 06:46 )   PT: 10.9 sec;   INR: 0.95 ratio         PTT - ( 07 Jul 2021 06:46 )  PTT:27.2 sec        MICROBIOLOGY:     RADIOLOGY:  [ ] Reviewed and interpreted by me    PULMONARY FUNCTION TESTS:    EKG: CHIEF COMPLAINT: Patient is a 66y old  Female who presents with a chief complaint of SOB.    Interval Events: No interval events noted overnight.    REVIEW OF SYSTEMS:  Denies SOB, CP, abd pain.  [x] All other systems negative    OBJECTIVE:  ICU Vital Signs Last 24 Hrs  T(C): 36.4 (07 Jul 2021 04:50), Max: 36.9 (06 Jul 2021 13:15)  T(F): 97.5 (07 Jul 2021 04:50), Max: 98.5 (06 Jul 2021 13:15)  HR: 81 (07 Jul 2021 06:57) (77 - 101)  BP: 157/74 (07 Jul 2021 04:50) (144/66 - 157/74)  RR: 19 (07 Jul 2021 04:50) (19 - 19)  SpO2: 100% (07 Jul 2021 06:57) (91% - 100%)    Mode: standby    07-06 @ 07:01  -  07-07 @ 07:00  --------------------------------------------------------  IN: 600 mL / OUT: 0 mL / NET: 600 mL      CAPILLARY BLOOD GLUCOSE  69 (05 Jul 2021 11:27)      POCT Blood Glucose.: 122 mg/dL (06 Jul 2021 21:35)      HOSPITAL MEDICATIONS:  MEDICATIONS  (STANDING):  albuterol/ipratropium for Nebulization 3 milliLiter(s) Nebulizer every 6 hours  amLODIPine   Tablet 5 milliGRAM(s) Oral daily  ascorbic acid 500 milliGRAM(s) Oral daily  budesonide 160 MICROgram(s)/formoterol 4.5 MICROgram(s) Inhaler 2 Puff(s) Inhalation two times a day  cefepime   IVPB 2000 milliGRAM(s) IV Intermittent every 12 hours  chlorhexidine 4% Liquid 1 Application(s) Topical daily  dextrose 40% Gel 15 Gram(s) Oral once  dextrose 5%. 1000 milliLiter(s) (50 mL/Hr) IV Continuous <Continuous>  dextrose 5%. 1000 milliLiter(s) (100 mL/Hr) IV Continuous <Continuous>  dextrose 50% Injectable 25 Gram(s) IV Push once  dextrose 50% Injectable 12.5 Gram(s) IV Push once  dextrose 50% Injectable 25 Gram(s) IV Push once  famotidine    Tablet 40 milliGRAM(s) Oral at bedtime  ferrous    sulfate 325 milliGRAM(s) Oral daily  fluticasone propionate 50 MICROgram(s)/spray Nasal Spray 2 Spray(s) Both Nostrils two times a day  glucagon  Injectable 1 milliGRAM(s) IntraMuscular once  insulin lispro (ADMELOG) corrective regimen sliding scale   SubCutaneous three times a day before meals  insulin lispro (ADMELOG) corrective regimen sliding scale   SubCutaneous at bedtime  insulin lispro Injectable (ADMELOG) 8 Unit(s) SubCutaneous three times a day before meals  mirtazapine 7.5 milliGRAM(s) Oral at bedtime  montelukast 10 milliGRAM(s) Oral daily  pantoprazole    Tablet 40 milliGRAM(s) Oral before breakfast  polyethylene glycol 3350 17 Gram(s) Oral daily  senna 2 Tablet(s) Oral at bedtime  sodium chloride 0.65% Nasal 2 Spray(s) Both Nostrils every 12 hours  sodium chloride 3%  Inhalation 3 milliLiter(s) Inhalation every 6 hours    MEDICATIONS  (PRN):  acetaminophen   Tablet .. 650 milliGRAM(s) Oral every 4 hours PRN Mild Pain (1 - 3)  guaiFENesin Oral Liquid (Sugar-Free) 100 milliGRAM(s) Oral every 6 hours PRN Cough      LABS:                        11.7   7.80  )-----------( 142      ( 07 Jul 2021 06:46 )             38.4       PT/INR - ( 07 Jul 2021 06:46 )   PT: 10.9 sec;   INR: 0.95 ratio         PTT - ( 07 Jul 2021 06:46 )  PTT:27.2 sec        MICROBIOLOGY:     RADIOLOGY:  [ ] Reviewed and interpreted by me    PULMONARY FUNCTION TESTS:    EKG:

## 2021-07-07 NOTE — PRE PROCEDURE NOTE - HISTORY OF PRESENT ILLNESS
Interventional Radiology  Pre-Procedure Note    This is a 66y  Female  presenting for transjugular liver biopsy    HPI:  66 y.o. F w/ a hx of diffuse cystic bronchiectasis, primary ciliary dyskinesia on 2L O2, ?ABPA, HTN, DM2, recent admission for CR pseudomonas PNA who presents from OP Pulmonary office for worsening SOB, lethargy and cough. Patient was recently hospitalized in April 2021 for Carbapenem resistant Pseudomonas treated with 1 week of Avycaz and inhaled Tobramycin. She was discharged home and has been taking Tobramycin inhalation 1x every other month. Since discharge, patient has become increasingly more lethargic and weak. She can only get out of bed with significant assistance and can no longer ambulate independently. She is able to tolerate about 50% of her usual PO intake. Patient's son notes she has been much weaker, more confused and has not michelle answering questions appropriately. She was started on a Prednisone taper for unclear reasons about 10 days ago (20mg x 5, 15mg x 5, 10mg x 5 days). Over the last two weeks, patient's son notes worsening SOB, worsening cough over the last few days with thicker yellow mucus production, chills and night sweats. Patient endorses sinus congestion. Patient unable to answer many questions and lethargic on evaluation.    The patient was found to have cirrhosis on imaging. Followed by hepatology. liver biopsy requested for diagnosis.    PAST MEDICAL & SURGICAL HISTORY:  ABPA (allergic bronchopulmonary aspergillosis)    Bronchiectasis    Hypertension    Vitamin D deficiency    Microcytic anemia    GERD (gastroesophageal reflux disease)    Postnasal drip    Pseudomonas aeruginosa colonization    Allergic rhinitis    Recurrent pneumonia    Type 2 diabetes mellitus, with long-term current use of insulin    History of cholecystectomy        Social History:     FAMILY HISTORY:  Family history of diabetes mellitus  father    Family history of allergic rhinitis (Child, Child)  son, daughter    Family history of bronchitis  parents        Allergies: No Known Allergies      Current Medications: acetaminophen   Tablet .. 650 milliGRAM(s) Oral every 4 hours PRN  albuterol/ipratropium for Nebulization 3 milliLiter(s) Nebulizer every 6 hours  amLODIPine   Tablet 5 milliGRAM(s) Oral daily  ascorbic acid 500 milliGRAM(s) Oral daily  budesonide 160 MICROgram(s)/formoterol 4.5 MICROgram(s) Inhaler 2 Puff(s) Inhalation two times a day  cefepime   IVPB 2000 milliGRAM(s) IV Intermittent every 12 hours  chlorhexidine 4% Liquid 1 Application(s) Topical daily  dextrose 40% Gel 15 Gram(s) Oral once  dextrose 5%. 1000 milliLiter(s) IV Continuous <Continuous>  dextrose 5%. 1000 milliLiter(s) IV Continuous <Continuous>  dextrose 50% Injectable 25 Gram(s) IV Push once  dextrose 50% Injectable 12.5 Gram(s) IV Push once  dextrose 50% Injectable 25 Gram(s) IV Push once  famotidine    Tablet 40 milliGRAM(s) Oral at bedtime  ferrous    sulfate 325 milliGRAM(s) Oral daily  fluticasone propionate 50 MICROgram(s)/spray Nasal Spray 2 Spray(s) Both Nostrils two times a day  glucagon  Injectable 1 milliGRAM(s) IntraMuscular once  guaiFENesin Oral Liquid (Sugar-Free) 100 milliGRAM(s) Oral every 6 hours PRN  insulin lispro (ADMELOG) corrective regimen sliding scale   SubCutaneous three times a day before meals  insulin lispro (ADMELOG) corrective regimen sliding scale   SubCutaneous at bedtime  insulin lispro Injectable (ADMELOG) 8 Unit(s) SubCutaneous three times a day before meals  mirtazapine 7.5 milliGRAM(s) Oral at bedtime  montelukast 10 milliGRAM(s) Oral daily  pantoprazole    Tablet 40 milliGRAM(s) Oral before breakfast  polyethylene glycol 3350 17 Gram(s) Oral daily  senna 2 Tablet(s) Oral at bedtime  sodium chloride 0.65% Nasal 2 Spray(s) Both Nostrils every 12 hours  sodium chloride 3%  Inhalation 3 milliLiter(s) Inhalation every 6 hours      Labs:                         11.7   7.80  )-----------( 142      ( 07 Jul 2021 06:46 )             38.4       07-07    140  |  100  |  16  ----------------------------<  146<H>  4.2   |  34<H>  |  0.37<L>    Ca    9.2      07 Jul 2021 06:46  Phos  3.3     07-07  Mg     1.90     07-07        HCG:       Assessment/Plan:   This is a 66y Female  presents with cirrhosis and respiratory failure  Patient presents to IR for transjugular liver biposy.  Procedure/ risks/ benefits/ goals/ alternatives were explained. All questions answered. Informed content obtained from patient. Consent placed in chart.

## 2021-07-08 LAB
A1AT SERPL-MCNC: 163 MG/DL — SIGNIFICANT CHANGE UP (ref 90–200)
ALBUMIN SERPL ELPH-MCNC: 2.9 G/DL — LOW (ref 3.3–5)
ALP SERPL-CCNC: 141 U/L — HIGH (ref 40–120)
ALT FLD-CCNC: 38 U/L — HIGH (ref 4–33)
ANION GAP SERPL CALC-SCNC: 11 MMOL/L — SIGNIFICANT CHANGE UP (ref 7–14)
AST SERPL-CCNC: 46 U/L — HIGH (ref 4–32)
BILIRUB SERPL-MCNC: 0.2 MG/DL — SIGNIFICANT CHANGE UP (ref 0.2–1.2)
BUN SERPL-MCNC: 11 MG/DL — SIGNIFICANT CHANGE UP (ref 7–23)
CALCIUM SERPL-MCNC: 9.4 MG/DL — SIGNIFICANT CHANGE UP (ref 8.4–10.5)
CHLORIDE SERPL-SCNC: 98 MMOL/L — SIGNIFICANT CHANGE UP (ref 98–107)
CO2 SERPL-SCNC: 28 MMOL/L — SIGNIFICANT CHANGE UP (ref 22–31)
CREAT SERPL-MCNC: 0.33 MG/DL — LOW (ref 0.5–1.3)
GLUCOSE BLDC GLUCOMTR-MCNC: 126 MG/DL — HIGH (ref 70–99)
GLUCOSE BLDC GLUCOMTR-MCNC: 137 MG/DL — HIGH (ref 70–99)
GLUCOSE BLDC GLUCOMTR-MCNC: 193 MG/DL — HIGH (ref 70–99)
GLUCOSE BLDC GLUCOMTR-MCNC: 268 MG/DL — HIGH (ref 70–99)
GLUCOSE SERPL-MCNC: 77 MG/DL — SIGNIFICANT CHANGE UP (ref 70–99)
HCT VFR BLD CALC: 37.3 % — SIGNIFICANT CHANGE UP (ref 34.5–45)
HGB BLD-MCNC: 11.2 G/DL — LOW (ref 11.5–15.5)
MAGNESIUM SERPL-MCNC: 2 MG/DL — SIGNIFICANT CHANGE UP (ref 1.6–2.6)
MCHC RBC-ENTMCNC: 26.7 PG — LOW (ref 27–34)
MCHC RBC-ENTMCNC: 30 GM/DL — LOW (ref 32–36)
MCV RBC AUTO: 89 FL — SIGNIFICANT CHANGE UP (ref 80–100)
NRBC # BLD: 0 /100 WBCS — SIGNIFICANT CHANGE UP
NRBC # FLD: 0 K/UL — SIGNIFICANT CHANGE UP
PHOSPHATE SERPL-MCNC: 3.3 MG/DL — SIGNIFICANT CHANGE UP (ref 2.5–4.5)
PLATELET # BLD AUTO: 148 K/UL — LOW (ref 150–400)
POTASSIUM SERPL-MCNC: 4.4 MMOL/L — SIGNIFICANT CHANGE UP (ref 3.5–5.3)
POTASSIUM SERPL-SCNC: 4.4 MMOL/L — SIGNIFICANT CHANGE UP (ref 3.5–5.3)
PROT SERPL-MCNC: 6.3 G/DL — SIGNIFICANT CHANGE UP (ref 6–8.3)
RBC # BLD: 4.19 M/UL — SIGNIFICANT CHANGE UP (ref 3.8–5.2)
RBC # FLD: 13.7 % — SIGNIFICANT CHANGE UP (ref 10.3–14.5)
SODIUM SERPL-SCNC: 137 MMOL/L — SIGNIFICANT CHANGE UP (ref 135–145)
WBC # BLD: 7.26 K/UL — SIGNIFICANT CHANGE UP (ref 3.8–10.5)
WBC # FLD AUTO: 7.26 K/UL — SIGNIFICANT CHANGE UP (ref 3.8–10.5)

## 2021-07-08 PROCEDURE — 99233 SBSQ HOSP IP/OBS HIGH 50: CPT | Mod: GC

## 2021-07-08 PROCEDURE — 70542 MRI ORBIT/FACE/NECK W/DYE: CPT | Mod: 26

## 2021-07-08 PROCEDURE — 99231 SBSQ HOSP IP/OBS SF/LOW 25: CPT | Mod: GC

## 2021-07-08 RX ADMIN — Medication 500 MILLIGRAM(S): at 12:08

## 2021-07-08 RX ADMIN — Medication 2: at 21:25

## 2021-07-08 RX ADMIN — Medication 8 UNIT(S): at 07:44

## 2021-07-08 RX ADMIN — Medication 325 MILLIGRAM(S): at 12:08

## 2021-07-08 RX ADMIN — Medication 3 MILLILITER(S): at 03:54

## 2021-07-08 RX ADMIN — MONTELUKAST 10 MILLIGRAM(S): 4 TABLET, CHEWABLE ORAL at 12:15

## 2021-07-08 RX ADMIN — CHLORHEXIDINE GLUCONATE 1 APPLICATION(S): 213 SOLUTION TOPICAL at 12:10

## 2021-07-08 RX ADMIN — Medication 3 MILLILITER(S): at 22:04

## 2021-07-08 RX ADMIN — CEFEPIME 100 MILLIGRAM(S): 1 INJECTION, POWDER, FOR SOLUTION INTRAMUSCULAR; INTRAVENOUS at 19:08

## 2021-07-08 RX ADMIN — SODIUM CHLORIDE 3 MILLILITER(S): 9 INJECTION INTRAMUSCULAR; INTRAVENOUS; SUBCUTANEOUS at 03:55

## 2021-07-08 RX ADMIN — ENOXAPARIN SODIUM 40 MILLIGRAM(S): 100 INJECTION SUBCUTANEOUS at 12:07

## 2021-07-08 RX ADMIN — INSULIN GLARGINE 15 UNIT(S): 100 INJECTION, SOLUTION SUBCUTANEOUS at 21:24

## 2021-07-08 RX ADMIN — Medication 650 MILLIGRAM(S): at 06:27

## 2021-07-08 RX ADMIN — AMLODIPINE BESYLATE 5 MILLIGRAM(S): 2.5 TABLET ORAL at 06:14

## 2021-07-08 RX ADMIN — Medication 8 UNIT(S): at 12:15

## 2021-07-08 RX ADMIN — CEFEPIME 100 MILLIGRAM(S): 1 INJECTION, POWDER, FOR SOLUTION INTRAMUSCULAR; INTRAVENOUS at 06:13

## 2021-07-08 RX ADMIN — Medication 2: at 07:44

## 2021-07-08 RX ADMIN — MIRTAZAPINE 7.5 MILLIGRAM(S): 45 TABLET, ORALLY DISINTEGRATING ORAL at 21:23

## 2021-07-08 RX ADMIN — Medication 2 SPRAY(S): at 06:15

## 2021-07-08 RX ADMIN — FAMOTIDINE 40 MILLIGRAM(S): 10 INJECTION INTRAVENOUS at 21:23

## 2021-07-08 RX ADMIN — Medication 2 SPRAY(S): at 06:14

## 2021-07-08 RX ADMIN — Medication 2 SPRAY(S): at 18:56

## 2021-07-08 RX ADMIN — SODIUM CHLORIDE 3 MILLILITER(S): 9 INJECTION INTRAMUSCULAR; INTRAVENOUS; SUBCUTANEOUS at 22:04

## 2021-07-08 RX ADMIN — POLYETHYLENE GLYCOL 3350 17 GRAM(S): 17 POWDER, FOR SOLUTION ORAL at 12:07

## 2021-07-08 RX ADMIN — BUDESONIDE AND FORMOTEROL FUMARATE DIHYDRATE 2 PUFF(S): 160; 4.5 AEROSOL RESPIRATORY (INHALATION) at 22:16

## 2021-07-08 RX ADMIN — Medication 650 MILLIGRAM(S): at 06:12

## 2021-07-08 RX ADMIN — BUDESONIDE AND FORMOTEROL FUMARATE DIHYDRATE 2 PUFF(S): 160; 4.5 AEROSOL RESPIRATORY (INHALATION) at 09:38

## 2021-07-08 RX ADMIN — SENNA PLUS 2 TABLET(S): 8.6 TABLET ORAL at 21:43

## 2021-07-08 RX ADMIN — SODIUM CHLORIDE 3 MILLILITER(S): 9 INJECTION INTRAMUSCULAR; INTRAVENOUS; SUBCUTANEOUS at 09:27

## 2021-07-08 RX ADMIN — Medication 3 MILLILITER(S): at 15:05

## 2021-07-08 RX ADMIN — PANTOPRAZOLE SODIUM 40 MILLIGRAM(S): 20 TABLET, DELAYED RELEASE ORAL at 06:13

## 2021-07-08 RX ADMIN — Medication 3 MILLILITER(S): at 09:26

## 2021-07-08 RX ADMIN — Medication 8 UNIT(S): at 19:03

## 2021-07-08 RX ADMIN — SODIUM CHLORIDE 3 MILLILITER(S): 9 INJECTION INTRAMUSCULAR; INTRAVENOUS; SUBCUTANEOUS at 15:05

## 2021-07-08 NOTE — PROGRESS NOTE ADULT - ASSESSMENT
63 year old female, with past history significant for HTN, Type-II DM, Bronchiectasis (cystic), Allergic bronchopulmonary Aspergillosis, Stenotrophomonas colonization, cirrhosis s/p transjugular liver biopsy, 7/7/21.  - Pt doing well following transjugular liver biopsy yesterday  - Right neck puncture site stable  - f/u path results    [id number]

## 2021-07-08 NOTE — DIETITIAN INITIAL EVALUATION ADULT. - PROBLEM SELECTOR PLAN 1
Acute hypercarbic respiratory failure, hx of bronchiectasis, primary ciliary dyskinesia and COPD with diffuse wheezing noted on presentation. Hx of CR Pseudomonas in the past, will cover empirically with Avycaz overnight given severity of illness. Will need ID consult in AM if continuing.   - Start Bipap   - Prednisone 40 QD   - Standing duonebs + Albuterol PRN + Hypertonic saline to facilitate airway clearance + home Symbicort   - Airway clearance device, chest PT  - Robitussin PRN   [ ] Sputum cx  [ ] ICU consult  [ ] Repeat ABG on Bipap  [ ] Would benefit from CT chest for better evaluation, however patient not currently stable for CT   - Discussed code status with son at bedside, son will discuss with family

## 2021-07-08 NOTE — PROGRESS NOTE ADULT - SUBJECTIVE AND OBJECTIVE BOX
Chief Complaint:  Patient is a 66y old  Female who presents with a chief complaint of SOB (08 Jul 2021 06:57)      Interval Events: NAEON. Underwent TJ liver bx yesterday and tolerated procedure well. Pending pathology results and MRCP.      Hospital Medications:  acetaminophen   Tablet .. 650 milliGRAM(s) Oral every 4 hours PRN  albuterol/ipratropium for Nebulization 3 milliLiter(s) Nebulizer every 6 hours  amLODIPine   Tablet 5 milliGRAM(s) Oral daily  ascorbic acid 500 milliGRAM(s) Oral daily  budesonide 160 MICROgram(s)/formoterol 4.5 MICROgram(s) Inhaler 2 Puff(s) Inhalation two times a day  cefepime   IVPB 2000 milliGRAM(s) IV Intermittent every 12 hours  chlorhexidine 4% Liquid 1 Application(s) Topical daily  dextrose 40% Gel 15 Gram(s) Oral once  dextrose 5%. 1000 milliLiter(s) IV Continuous <Continuous>  dextrose 5%. 1000 milliLiter(s) IV Continuous <Continuous>  dextrose 50% Injectable 25 Gram(s) IV Push once  dextrose 50% Injectable 12.5 Gram(s) IV Push once  dextrose 50% Injectable 25 Gram(s) IV Push once  enoxaparin Injectable 40 milliGRAM(s) SubCutaneous daily  famotidine    Tablet 40 milliGRAM(s) Oral at bedtime  ferrous    sulfate 325 milliGRAM(s) Oral daily  fluticasone propionate 50 MICROgram(s)/spray Nasal Spray 2 Spray(s) Both Nostrils two times a day  glucagon  Injectable 1 milliGRAM(s) IntraMuscular once  guaiFENesin Oral Liquid (Sugar-Free) 100 milliGRAM(s) Oral every 6 hours PRN  insulin glargine Injectable (LANTUS) 15 Unit(s) SubCutaneous at bedtime  insulin lispro (ADMELOG) corrective regimen sliding scale   SubCutaneous three times a day before meals  insulin lispro (ADMELOG) corrective regimen sliding scale   SubCutaneous at bedtime  insulin lispro Injectable (ADMELOG) 8 Unit(s) SubCutaneous three times a day before meals  mirtazapine 7.5 milliGRAM(s) Oral at bedtime  montelukast 10 milliGRAM(s) Oral daily  pantoprazole    Tablet 40 milliGRAM(s) Oral before breakfast  polyethylene glycol 3350 17 Gram(s) Oral daily  senna 2 Tablet(s) Oral at bedtime  sodium chloride 0.65% Nasal 2 Spray(s) Both Nostrils every 12 hours  sodium chloride 3%  Inhalation 3 milliLiter(s) Inhalation every 6 hours      PMHX/PSHX:  DM (diabetes mellitus)    Bronchitis    ABPA (allergic bronchopulmonary aspergillosis)    Bronchiectasis    Asthma    Hypertension    Vitamin D deficiency    Microcytic anemia    GERD (gastroesophageal reflux disease)    Postnasal drip    Pseudomonas aeruginosa colonization    Allergic rhinitis    Recurrent pneumonia    Type 2 diabetes mellitus, with long-term current use of insulin    History of cholecystectomy            ROS: 10 point ROS negative unless otherwise stated in HPI      PHYSICAL EXAM:     GENERAL:  Well developed, no distress  HEENT:  NC/AT,  conjunctivae clear, sclera anicteric  CHEST:  +diffusely rhonchorus breath sounds  HEART:  Regular rhythm & rate  ABDOMEN:  Soft, non-tender, +mildly distended; +BS  EXTREMITIES:  no LE  edema  SKIN:  No rash/erythema/ecchymoses/petechiae/wounds/jaundice  NEURO:  Alert, oriented      Vital Signs:  Vital Signs Last 24 Hrs  T(C): 36.6 (08 Jul 2021 06:02), Max: 36.6 (08 Jul 2021 06:02)  T(F): 97.9 (08 Jul 2021 06:02), Max: 97.9 (08 Jul 2021 06:02)  HR: 80 (08 Jul 2021 07:02) (76 - 98)  BP: 149/81 (08 Jul 2021 06:02) (135/83 - 150/72)  BP(mean): --  RR: 18 (08 Jul 2021 06:02) (16 - 18)  SpO2: 99% (08 Jul 2021 07:02) (95% - 100%)  Daily     Daily     LABS:                        11.2   7.26  )-----------( 148      ( 08 Jul 2021 06:58 )             37.3     07-08    137  |  98  |  11  ----------------------------<  77  4.4   |  28  |  0.33<L>    Ca    9.4      08 Jul 2021 06:58  Phos  3.3     07-08  Mg     2.00     07-08    TPro  6.3  /  Alb  2.9<L>  /  TBili  0.2  /  DBili  x   /  AST  46<H>  /  ALT  38<H>  /  AlkPhos  141<H>  07-08    LIVER FUNCTIONS - ( 08 Jul 2021 06:58 )  Alb: 2.9 g/dL / Pro: 6.3 g/dL / ALK PHOS: 141 U/L / ALT: 38 U/L / AST: 46 U/L / GGT: x           PT/INR - ( 07 Jul 2021 06:46 )   PT: 10.9 sec;   INR: 0.95 ratio         PTT - ( 07 Jul 2021 06:46 )  PTT:27.2 sec        Imaging:             Chief Complaint:  Patient is a 66y old  Female who presents with a chief complaint of SOB (08 Jul 2021 06:57)      Interval Events: NAEON. Underwent TJ liver bx yesterday and tolerated procedure well. Still c/o abdominal gas/bloating.      Hospital Medications:  acetaminophen   Tablet .. 650 milliGRAM(s) Oral every 4 hours PRN  albuterol/ipratropium for Nebulization 3 milliLiter(s) Nebulizer every 6 hours  amLODIPine   Tablet 5 milliGRAM(s) Oral daily  ascorbic acid 500 milliGRAM(s) Oral daily  budesonide 160 MICROgram(s)/formoterol 4.5 MICROgram(s) Inhaler 2 Puff(s) Inhalation two times a day  cefepime   IVPB 2000 milliGRAM(s) IV Intermittent every 12 hours  chlorhexidine 4% Liquid 1 Application(s) Topical daily  dextrose 40% Gel 15 Gram(s) Oral once  dextrose 5%. 1000 milliLiter(s) IV Continuous <Continuous>  dextrose 5%. 1000 milliLiter(s) IV Continuous <Continuous>  dextrose 50% Injectable 25 Gram(s) IV Push once  dextrose 50% Injectable 12.5 Gram(s) IV Push once  dextrose 50% Injectable 25 Gram(s) IV Push once  enoxaparin Injectable 40 milliGRAM(s) SubCutaneous daily  famotidine    Tablet 40 milliGRAM(s) Oral at bedtime  ferrous    sulfate 325 milliGRAM(s) Oral daily  fluticasone propionate 50 MICROgram(s)/spray Nasal Spray 2 Spray(s) Both Nostrils two times a day  glucagon  Injectable 1 milliGRAM(s) IntraMuscular once  guaiFENesin Oral Liquid (Sugar-Free) 100 milliGRAM(s) Oral every 6 hours PRN  insulin glargine Injectable (LANTUS) 15 Unit(s) SubCutaneous at bedtime  insulin lispro (ADMELOG) corrective regimen sliding scale   SubCutaneous three times a day before meals  insulin lispro (ADMELOG) corrective regimen sliding scale   SubCutaneous at bedtime  insulin lispro Injectable (ADMELOG) 8 Unit(s) SubCutaneous three times a day before meals  mirtazapine 7.5 milliGRAM(s) Oral at bedtime  montelukast 10 milliGRAM(s) Oral daily  pantoprazole    Tablet 40 milliGRAM(s) Oral before breakfast  polyethylene glycol 3350 17 Gram(s) Oral daily  senna 2 Tablet(s) Oral at bedtime  sodium chloride 0.65% Nasal 2 Spray(s) Both Nostrils every 12 hours  sodium chloride 3%  Inhalation 3 milliLiter(s) Inhalation every 6 hours      PMHX/PSHX:  DM (diabetes mellitus)    Bronchitis    ABPA (allergic bronchopulmonary aspergillosis)    Bronchiectasis    Asthma    Hypertension    Vitamin D deficiency    Microcytic anemia    GERD (gastroesophageal reflux disease)    Postnasal drip    Pseudomonas aeruginosa colonization    Allergic rhinitis    Recurrent pneumonia    Type 2 diabetes mellitus, with long-term current use of insulin    History of cholecystectomy            ROS: 10 point ROS negative unless otherwise stated in HPI      PHYSICAL EXAM:     GENERAL:  Well developed, no distress  HEENT:  NC/AT,  conjunctivae clear, sclera anicteric  CHEST:  +diffusely rhonchorus breath sounds  HEART:  Regular rhythm & rate  ABDOMEN:  Soft, non-tender, +mildly distended; +BS  EXTREMITIES:  no LE  edema  SKIN:  No rash/erythema/ecchymoses/petechiae/wounds/jaundice  NEURO:  Alert, oriented      Vital Signs:  Vital Signs Last 24 Hrs  T(C): 36.6 (08 Jul 2021 06:02), Max: 36.6 (08 Jul 2021 06:02)  T(F): 97.9 (08 Jul 2021 06:02), Max: 97.9 (08 Jul 2021 06:02)  HR: 80 (08 Jul 2021 07:02) (76 - 98)  BP: 149/81 (08 Jul 2021 06:02) (135/83 - 150/72)  BP(mean): --  RR: 18 (08 Jul 2021 06:02) (16 - 18)  SpO2: 99% (08 Jul 2021 07:02) (95% - 100%)  Daily     Daily     LABS:                        11.2   7.26  )-----------( 148      ( 08 Jul 2021 06:58 )             37.3     07-08    137  |  98  |  11  ----------------------------<  77  4.4   |  28  |  0.33<L>    Ca    9.4      08 Jul 2021 06:58  Phos  3.3     07-08  Mg     2.00     07-08    TPro  6.3  /  Alb  2.9<L>  /  TBili  0.2  /  DBili  x   /  AST  46<H>  /  ALT  38<H>  /  AlkPhos  141<H>  07-08    LIVER FUNCTIONS - ( 08 Jul 2021 06:58 )  Alb: 2.9 g/dL / Pro: 6.3 g/dL / ALK PHOS: 141 U/L / ALT: 38 U/L / AST: 46 U/L / GGT: x           PT/INR - ( 07 Jul 2021 06:46 )   PT: 10.9 sec;   INR: 0.95 ratio         PTT - ( 07 Jul 2021 06:46 )  PTT:27.2 sec        Imaging: No new imaging

## 2021-07-08 NOTE — PROGRESS NOTE ADULT - ASSESSMENT
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 Hepatology consulted to assist with workup/evaluation of possible underlying cirrhosis in the setting of pending lung transplant evaluation. Per MRCP from 04/2021, pt without stricturing or hepatic lesions. Workup for cirrhosis so far was notable for positive ASMA of 1:40, Child Maria Class A; MELD Na 9; FIB4- 1.96 which suggested unlikely advanced fibrosis, normal PLT, (She had positive anti_HCV but negative HCV PCR, no evidence of active or chronic HCV). Was never treated for chronic HCV. s/p transjugular liver bx by IR on 7/7 with 3 mmHg wedged - RA gradient; 2 core specimens were obtained. Pending pathology results and repeat abdominal imaging.    Impression:  #Possible cirrhosis- Child Maria Class A; MELD Na 9; FIB4- 1.96  - Etiology: unlikely hep C given neg RNA; history of blood transfusion in 1998. 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 CLD work up previously: HBsAg neg, HbcIgM neg, c Total neg, sAb neg, IgG, IgA, IgM wnl, RUPESH, anti-LKM, AMA negative, Hepatitis A neg, ferritin 169, AFP wnl, ASMA 1:40. MR/MRCP without stricturing or hepatic lesions. No ascites on all imaging and no hx of HE.  - ascites: none  - HE: none  - Varices: unknown, never had EGD  - no other signs of portal HTN; no splenomegaly, normal platelets, no ascites    Recommendations:  - given need for evaluation prior to lung transplant eval, will need definitive diagnosis of cirrhosis  - f/u pathology from TJ liver bx 7/7  - recommend repeat cross sectional abdominal imaging preferably with MRCP to evaluate bile ducts given h/o CBD stricturing  - f/u IgG, IgA, IgM, A1AT   - f/u daily PT/INR, CMP, CBC for AM  - rest of care per primary team    Kailey Lewis MD PGY-4  Gastroenterology Fellow  Pager #[phone] ([facility]) or [phone] ([facility])  Available on [employer] Teams.  Please contact on-call GI fellow via answering service ([phone]) after 5pm and before 8am, and on weekends.    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 Hepatology consulted to assist with workup/evaluation of possible underlying cirrhosis in the setting of pending lung transplant evaluation. history of blood transfusion in 1998. 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 CLD work up previously: HBsAg neg, HbcIgM neg, c Total neg, sAb neg, IgG, IgA, IgM wnl, RUPESH, anti-LKM, AMA negative, Hepatitis A neg, ferritin 169, AFP wnl, ASMA 1:40. Per MRCP from 04/2021, pt without stricturing or hepatic lesions. Workup for cirrhosis so far was notable for positive ASMA of 1:40, Child Maria Class A; MELD Na 9; FIB4- 1.96 which suggested unlikely advanced fibrosis, normal PLT, (She had positive anti_HCV but negative HCV PCR, no evidence of active or chronic HCV), A1AT was 163 (normal) this admission. Was never treated for chronic HCV. s/p transjugular liver bx by IR on 7/7 with 3 mmHg wedged - RA gradient; 2 core specimens were obtained. Pending pathology results and repeat MRCP (ordered given h/o bile duct stricturing in [place]).    Impression:  #Possible cirrhosis- Child Maria Class A; MELD Na 9; FIB4- 1.96  - Etiology: unlikely hep C given neg RNA; history of blood transfusion in 1998. 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 CLD work up previously: HBsAg neg, HbcIgM neg, c Total neg, sAb neg, IgG, IgA, IgM wnl, RUPESH, anti-LKM, AMA negative, Hepatitis A neg, ferritin 169, AFP wnl, ASMA 1:40. MR/MRCP without stricturing or hepatic lesions. No ascites on all imaging and no hx of HE. A1AT was 163 (normal) this admission.  - ascites: none  - HE: none  - Varices: unknown, never had EGD  - no other signs of portal HTN; no splenomegaly, normal platelets, no ascites    Recommendations:  - given need for evaluation prior to lung transplant eval, will need definitive diagnosis of cirrhosis  - f/u pathology from TJ liver bx 7/7  - f/u MRCP to evaluate bile ducts given h/o CBD stricturing  - f/u IgG, IgA, IgM  - f/u daily PT/INR, CMP, CBC for AM  - rest of care per primary team    Kailey Lewis MD PGY-4  Gastroenterology Fellow  Pager #[phone] ([facility]) or [phone] ([facility])  Available on [employer] Teams.  Please contact on-call GI fellow via answering service ([phone]) after 5pm and before 8am, and on weekends.    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 Hepatology consulted to assist with workup/evaluation of possible underlying cirrhosis in the setting of pending lung transplant evaluation. history of blood transfusion in 1998. 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 CLD work up previously: HBsAg neg, HbcIgM neg, c Total neg, sAb neg, IgG, IgA, IgM wnl, RUPESH, anti-LKM, AMA negative, Hepatitis A neg, ferritin 169, AFP wnl, ASMA 1:40. Per MRCP from 04/2021, pt without stricturing or hepatic lesions. Workup for cirrhosis so far was notable for positive ASMA of 1:40, Child Maria Class A; MELD Na 9; FIB4- 1.96 which suggested unlikely advanced fibrosis, normal PLT, (She had positive anti_HCV but negative HCV PCR, no evidence of active or chronic HCV), A1AT was 163 (normal) this admission. Was never treated for chronic HCV. s/p transjugular liver bx by IR on 7/7 with 3 mmHg wedged - RA gradient; 2 core specimens were obtained. Pending pathology results and repeat MRCP (ordered given h/o bile duct stricturing in [place]).    Impression:  #Possible cirrhosis- Child Maria Class A; MELD Na 9; FIB4- 1.96  - Etiology: unlikely hep C given neg RNA; history of blood transfusion in 1998. 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 CLD work up previously: HBsAg neg, HbcIgM neg, c Total neg, sAb neg, IgG, IgA, IgM wnl, RUPESH, anti-LKM, AMA negative, Hepatitis A neg, ferritin 169, AFP wnl, ASMA 1:40. MR/MRCP without stricturing or hepatic lesions. No ascites on all imaging and no hx of HE. A1AT was 163 (normal) this admission.  - ascites: none  - HE: none  - Varices: unknown, never had EGD  - no other signs of portal HTN; no splenomegaly, normal platelets, no ascites    Recommendations:  - given need for evaluation prior to lung transplant eval, will need definitive diagnosis of cirrhosis  - f/u pathology from TJ liver bx 7/7  - patient to follow up in liver clinic in 2 weeks (will message schedulers and provide pt with liver clinic number prior to discharge)  - will need OP MRCP to evaluate bile ducts given h/o CBD stricturing  - f/u IgG, IgA, IgM  - f/u CBC, INR, CMP  - rest of care per primary team    Primary team is planning on discharge today. We will message liver clinic to have her follow up in 2 weeks for path and to order MRCP.    Kailey Lewis MD PGY-4  Gastroenterology Fellow  Pager #[phone] ([facility]) or [phone] ([facility])  Available on [employer] Teams.  Please contact on-call GI fellow via answering service ([phone]) after 5pm and before 8am, and on weekends.

## 2021-07-08 NOTE — DIETITIAN INITIAL EVALUATION ADULT. - OTHER INFO
Pt. w PMH diffuse cystic bronchiectasis, on 2L NC, recurrent sinusitis, HTN, GERD, IDDM2, vitamin D deficiency and microcytic anemia, possible cirrhosis.    Admitted for acute on chronic hypercarbic respiratory failure.  Cachectic appearance.       Pt. Leandro speaking... spouse at bedside.  Offered  phone service, however declined and prefers daughter to translate (via phone).      Pt. with reported and noted good appetite/PO intake.  Primarily consuming foods provided by family from home.  100% consumption of Glucerna Shake supplement.  Pt. denies food allergies, nausea/vomiting/diarrhea/constipation, or issues with chewing/swallowing.  Last    Per daughter, Pt. was told to eat pureed like foods at home by a Dr. Noted MBS (Apr 2021) with findings of mild oropharyngeal dysphagia and SLP recommendation for soft foods w thin liquids.  Would liberalize current diet consistency.      Self monitors blood glucose 3x daily- 4x daily at home with values ranging from "" mg/dL.  Reviewed hypoglycemia prevention/management and encouraged consistent carbohydrate intake. Also advised on small, frequent meals as Pt. c/o early satiety.  Discussed nutrient/calorie dense foods given increased energy needs due to pulmonary issues.      Per daughter, reports Pt.'s usual body weight as "127lbs with decrease to "97lbs" x 3-4 months PTA.  However, this is questionable based on historical weight documentation review.  Previous admission in Apr 1 2021 63.5kg/140lbs (? accuracy).  43kg (Aug 2020 1 year ago)  44kg (May 2021)  48.9kg (on admission)  48.1kg (current)

## 2021-07-08 NOTE — DIETITIAN INITIAL EVALUATION ADULT. - PROBLEM SELECTOR PLAN 8
Hepatic cirrhosis dx US, MRCP. HCV Ab reactive, RNA not detected.   HIV neg. RUPESH, Mitochondrial Ab, microsomal Ab assay, all negative. ceruloplasmin normal, alpha fetoprotein normal Ca 19-9 mildly elevated at 42  [ ] OP Follow up with Hepatology

## 2021-07-08 NOTE — PROGRESS NOTE ADULT - ATTENDING COMMENTS
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   Patient has chronic cough on oxygen, no acute complaints, mild postprandial abdominal bloating, s/p a TJ liver biopsy, HVPG was < 5 mmHg, no evidence of portal hypertension,  but denies nausea, vomiting or abdominal pain.   Abdominal MRI with [facility] from 4/2021 reported cirrhosis, normal spleen, no ascites s/p cholecystectomy and pneumobilia. Workup for cirrhosis so far was notable for positive ASMA of 1:40, No recent liver tests, but normal INR, and mild thrombocytopenia.   A/P- nodular liver, possible burned out AIH with cirrhosis, or NRH if cirrhosis is ruled out.   Would recommend- workup for chronic liver disease such as  A1AT, IgG, A and M, repeat MRCP to rule out biliary stricture, await liver biopsy result, follow with hepatology faculty practice at [phone], and continue care per primary team.

## 2021-07-08 NOTE — PROGRESS NOTE ADULT - ASSESSMENT
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 Patient now presented from Pulmonary Office, Dr. Young for worsening SOB, lethargy and cough with thick yellow secretions. Now readmitted for acute on chronic hypercarbic respiratory failure likely in setting of bronchiectasis exacerbation with recurrent PNA.     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   - CT CHEST 6/30 with (+) bronchiectasis   - SCx 6/30 with Pseudomonas Aeurginosa  - Continue on aggressive chest PT with Mucomyst, Duoneb and Chest Vest Q6H   - Continue on Symbicort BID and Singular QD   - s/p Prednisone 40mg QD (6/30 - 7/3)   - s/p Avycaz/ NELLY (6/30 - 7/2) and c/w Cefepime (7/2- until 7/14)   - Hypercarbia improved and now continued on NC QD and AVAPs QHS.   - Will need home NIV set up prior to DC and f/u with Dr. Young outpatient for possible Lung transplant    # Acute on Chronic Sinusitis   - CT SINUS 6/30 with extensive inflammatory changes throughout the paranasal sinuses and their respective drainage pathways, , improved in the right frontal and sphenoid sinuses since comparison. 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 Cannot rule out CSF leak. Thinning of the left carotid canal wall along the posterior left sphenoid sinus. Cannot rule out dehiscence.  - Continue on Flonase and [place] BID   - MRI Sinuses pending   - ENT following    # Nodular Liver with possible Cirrhosis ?   - During admission in April patient was found to have nodular contour of liver on US 4/6/2021. Upon further discussion with patient and Hepatology, she was noted to have history of blood transfusion in 1998 with (+) Hep C AB with negative Hep C RNA. She also reports history of CBD stricture in 1998 in [place], s/p PTC/PTBD.   - CLD work up previously noted with HBsAg negative, HBcIgM negative, Hep E IgG negative, Hep A IgM negative, RUPESH, Anti-LKM and AMA negative, Ferritin 169, AFP within normal limits, ASMA 1:40. MR/MRCP without stricturing or hepatic lesions. No ascites on all imaging and no history of HE. No known varices/ no EGD record on record.   - Echo - EF 73%, unremarkable   - Hepatology consulted, recs apreciated  - S/P liver bx 7/7; f/u results    # IDDM2, A1C 8.9 (6/2021)   - At home on: Lantus 14U QHS and sliding scale  - In house on: Lantus 15U QHS and Lispro 8U + Moderate ISS in house while on steroids.   - Pt with episode of hypoglycemia at lunch today, per nursing staff pt did eat her breakfast this am  - Will trend FS: adjustments will be made after evaluating dinner time FS if warranted     # Ethics/ DISPO/ GOC  - FULL CODE and GOC discussed with Son, Michela ([phone]) who expresses wishes of DNI, however will discuss further for DNR. Family wishes for all medical treatment (including invasive and possible lung transplant) to be performed, however if her condition was to worsen they do not wish for intubation. GOC conversation ongoing.   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 Patient now presented from Pulmonary Office, Dr. Young for worsening SOB, lethargy and cough with thick yellow secretions. Now readmitted for acute on chronic hypercarbic respiratory failure likely in setting of bronchiectasis exacerbation with recurrent PNA.     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   - CT CHEST 6/30 with (+) bronchiectasis   - SCx 6/30 with Pseudomonas Aeurginosa  - Continue on aggressive chest PT with Mucomyst, Duoneb and Chest Vest Q6H   - Continue on Symbicort BID and Singular QD   - s/p Prednisone 40mg QD (6/30 - 7/3)   - s/p Avycaz/ NELLY (6/30 - 7/2) and c/w Cefepime (7/2- until 7/14); Can dc on levaquin  - Hypercarbia improved and now continued on NC QD and AVAPs QHS.   - Will need home NIV set up prior to DC and f/u with Dr. Young outpatient for possible Lung transplant    # Acute on Chronic Sinusitis   - CT SINUS 6/30 with extensive inflammatory changes throughout the paranasal sinuses and their respective drainage pathways, , improved in the right frontal and sphenoid sinuses since comparison. 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 Cannot rule out CSF leak. Thinning of the left carotid canal wall along the posterior left sphenoid sinus. Cannot rule out dehiscence.  - Continue on Flonase and [place] BID   - MRI Sinuses pending   - ENT following    # Nodular Liver with possible Cirrhosis ?   - During admission in April patient was found to have nodular contour of liver on US 4/6/2021. Upon further discussion with patient and Hepatology, she was noted to have history of blood transfusion in 1998 with (+) Hep C AB with negative Hep C RNA. She also reports history of CBD stricture in 1998 in [place], s/p PTC/PTBD.   - CLD work up previously noted with HBsAg negative, HBcIgM negative, Hep E IgG negative, Hep A IgM negative, RUPESH, Anti-LKM and AMA negative, Ferritin 169, AFP within normal limits, ASMA 1:40. MR/MRCP without stricturing or hepatic lesions. No ascites on all imaging and no history of HE. No known varices/ no EGD record on record.   - Echo - EF 73%, unremarkable   - Hepatology consulted, recs apreciated  - S/P liver bx 7/7; f/u results  - Follow up in Hepatology Fellow clinic.    # IDDM2, A1C 8.9 (6/2021)   - At home on: Lantus 14U QHS and sliding scale  - In house on: Lantus 15U QHS and Lispro 8U + Moderate ISS in house while on steroids.   - Pt with episode of hypoglycemia at lunch today, per nursing staff pt did eat her breakfast this am  - Will trend FS: adjustments will be made after evaluating dinner time FS if warranted     # Ethics/ DISPO/ GOC  - FULL CODE and GOC discussed with Son, Michela ([phone]) who expresses wishes of DNI, however will discuss further for DNR. Family wishes for all medical treatment (including invasive and possible lung transplant) to be performed, however if her condition was to worsen they do not wish for intubation. GOC conversation ongoing.   - PT recommends home with home PT   - DC planning tomorrow home 7/9 after MRI Sinuses.    # DVT PPX -Lovenox

## 2021-07-08 NOTE — CHART NOTE - NSCHARTNOTEFT_GEN_A_CORE
[facility] PA NOTE    Patient requires AVAPs as BIPAP was trial and failed at settings 16/5/28% with back rate 14.   Patient tolerating and doing well on AVAPs.     Mariah Owusu PA-C  Department of Medicine/ [facility]   In House Pager #[phone]

## 2021-07-08 NOTE — DIETITIAN INITIAL EVALUATION ADULT. - ETIOLOGY
In the context of chronic illness related to significantly increased energy requirements 2/2 to pulmonary dysfunction--> Hx/Dx bronchiectasis with acute on chronic respiratory failure

## 2021-07-08 NOTE — DIETITIAN INITIAL EVALUATION ADULT. - PERSON TAUGHT/METHOD
17 y/o M with hx of cardiomyopathy presented to ED as a trauma transfer s/p sledding injury -HT, -LOC, -AC. Pt lost control while sledding and crashed into a pole. Pt was found to have right sided hemothorax and pneumothorax, minimally displaced T8-T11 fractures.     Pt was admitted for monitoring, serial CBCs, and right chest tube placement.   Chest tube placement was confirm with CXR and lung was reinflated to correct position.   Hemoglobins were stable at 12-13 range.     Daily CXRs were obtained.   Chest tube was changed to water seal and subsequently removed. Post-pull confirmatory chest x ray was obtained and lung remained inflated.     Patient was evaluated by Neurosurgery for T spine fractures and was given a TLSO brace.   Pt is urinating, ambulating, is pain controlled.     Pt will be discharged to home.
verbal instruction/patient instructed/spouse instructed/daughter instructed/teach back - (Patient repeats in own words)

## 2021-07-08 NOTE — DIETITIAN INITIAL EVALUATION ADULT. - PERTINENT MEDS FT
MEDICATIONS  (STANDING):  albuterol/ipratropium for Nebulization 3 milliLiter(s) Nebulizer every 6 hours  amLODIPine   Tablet 5 milliGRAM(s) Oral daily  ascorbic acid 500 milliGRAM(s) Oral daily  budesonide 160 MICROgram(s)/formoterol 4.5 MICROgram(s) Inhaler 2 Puff(s) Inhalation two times a day  cefepime   IVPB 2000 milliGRAM(s) IV Intermittent every 12 hours  chlorhexidine 4% Liquid 1 Application(s) Topical daily  dextrose 40% Gel 15 Gram(s) Oral once  dextrose 5%. 1000 milliLiter(s) (50 mL/Hr) IV Continuous <Continuous>  dextrose 5%. 1000 milliLiter(s) (100 mL/Hr) IV Continuous <Continuous>  dextrose 50% Injectable 25 Gram(s) IV Push once  dextrose 50% Injectable 12.5 Gram(s) IV Push once  dextrose 50% Injectable 25 Gram(s) IV Push once  enoxaparin Injectable 40 milliGRAM(s) SubCutaneous daily  famotidine    Tablet 40 milliGRAM(s) Oral at bedtime  ferrous    sulfate 325 milliGRAM(s) Oral daily  fluticasone propionate 50 MICROgram(s)/spray Nasal Spray 2 Spray(s) Both Nostrils two times a day  glucagon  Injectable 1 milliGRAM(s) IntraMuscular once  insulin glargine Injectable (LANTUS) 15 Unit(s) SubCutaneous at bedtime  insulin lispro (ADMELOG) corrective regimen sliding scale   SubCutaneous three times a day before meals  insulin lispro (ADMELOG) corrective regimen sliding scale   SubCutaneous at bedtime  insulin lispro Injectable (ADMELOG) 8 Unit(s) SubCutaneous three times a day before meals  mirtazapine 7.5 milliGRAM(s) Oral at bedtime  montelukast 10 milliGRAM(s) Oral daily  pantoprazole    Tablet 40 milliGRAM(s) Oral before breakfast  polyethylene glycol 3350 17 Gram(s) Oral daily  senna 2 Tablet(s) Oral at bedtime  sodium chloride 0.65% Nasal 2 Spray(s) Both Nostrils every 12 hours  sodium chloride 3%  Inhalation 3 milliLiter(s) Inhalation every 6 hours

## 2021-07-08 NOTE — PROGRESS NOTE ADULT - SUBJECTIVE AND OBJECTIVE BOX
ANESTHESIA POSTOP CHECK    66y Female POSTOP DAY 1 S/P Transjugular liver biopsy    Vital Signs Last 24 Hrs  T(C): 36.6 (08 Jul 2021 06:02), Max: 36.6 (08 Jul 2021 06:02)  T(F): 97.9 (08 Jul 2021 06:02), Max: 97.9 (08 Jul 2021 06:02)  HR: 83 (08 Jul 2021 09:51) (76 - 86)  BP: 149/81 (08 Jul 2021 06:02) (135/83 - 150/72)  BP(mean): --  RR: 18 (08 Jul 2021 06:02) (16 - 18)  SpO2: 99% (08 Jul 2021 09:51) (95% - 100%)  I&O's Summary    07 Jul 2021 07:01  -  08 Jul 2021 07:00  --------------------------------------------------------  IN: 290 mL / OUT: 0 mL / NET: 290 mL        [X ] NO APPARENT ANESTHESIA COMPLICATIONS      Comments:

## 2021-07-08 NOTE — DIETITIAN INITIAL EVALUATION ADULT. - ADD RECOMMEND
-Change diet to consistent carbohydrate w evening snack, Sodium Restricted, Soft consistency + Glucerna Shake 8oz PO 2x daily

## 2021-07-08 NOTE — DIETITIAN NUTRITION RISK NOTIFICATION - TREATMENT: THE FOLLOWING DIET HAS BEEN RECOMMENDED
Change/liberalize diet to consistent carbohydrate w evening snack, low sodium, soft consistency + Glucerna Shake 8oz PO 2x daily

## 2021-07-08 NOTE — PROGRESS NOTE ADULT - SUBJECTIVE AND OBJECTIVE BOX
66y Female s/p transjugular liver biopsy on 7/7/21 in Interventional Radiology.     Patient seen and examined bedside resting comfortably. No complaints offered.    T(F): 97.9 (07-08-21 @ 06:02), Max: 97.9 (07-08-21 @ 06:02)  HR: 88 (07-08-21 @ 15:10) (76 - 88)  BP: 149/81 (07-08-21 @ 06:02) (135/83 - 149/81)  RR: 18 (07-08-21 @ 06:02) (17 - 18)  SpO2: 99% (07-08-21 @ 15:10) (95% - 100%)  Wt(kg): --    LABS:                        11.2   7.26  )-----------( 148      ( 08 Jul 2021 06:58 )             37.3     07-08    137  |  98  |  11  ----------------------------<  77  4.4   |  28  |  0.33<L>    Ca    9.4      08 Jul 2021 06:58  Phos  3.3     07-08  Mg     2.00     07-08    TPro  6.3  /  Alb  2.9<L>  /  TBili  0.2  /  DBili  x   /  AST  46<H>  /  ALT  38<H>  /  AlkPhos  141<H>  07-08    PT/INR - ( 07 Jul 2021 06:46 )   PT: 10.9 sec;   INR: 0.95 ratio         PTT - ( 07 Jul 2021 06:46 )  PTT:27.2 sec  I&O's Detail    07 Jul 2021 07:01  -  08 Jul 2021 07:00  --------------------------------------------------------  IN:    IV PiggyBack: 50 mL    Oral Fluid: 240 mL  Total IN: 290 mL    OUT:  Total OUT: 0 mL    Total NET: 290 mL            PHYSICAL EXAM:  General: Nontoxic, in NAD  Rt neck puncture site c/d, no hematoma

## 2021-07-08 NOTE — DIETITIAN INITIAL EVALUATION ADULT. - PERTINENT LABORATORY DATA
07-08    137  |  98  |  11  ----------------------------<  77  4.4   |  28  |  0.33<L>    Ca    9.4      08 Jul 2021 06:58  Phos  3.3     07-08  Mg     2.00     07-08    TPro  6.3  /  Alb  2.9<L>  /  TBili  0.2  /  DBili  x   /  AST  46<H>  /  ALT  38<H>  /  AlkPhos  141<H>  07-08    HbA1c 8.9%    CAPILLARY BLOOD GLUCOSE      POCT Blood Glucose.: 137 mg/dL (08 Jul 2021 11:39)  POCT Blood Glucose.: 193 mg/dL (08 Jul 2021 07:41)  POCT Blood Glucose.: 321 mg/dL (07 Jul 2021 21:44)  POCT Blood Glucose.: 164 mg/dL (07 Jul 2021 16:44)  POCT Blood Glucose.: 84 mg/dL (07 Jul 2021 14:59)

## 2021-07-08 NOTE — DIETITIAN INITIAL EVALUATION ADULT. - ORAL INTAKE PTA/DIET HISTORY
Eats 3 meals/day & drinks water or tea.  Meals are primarily home prepared.    Has been very restrictive with food choices attributed to prior diet information provided by an outpatient physician (per daughter).

## 2021-07-08 NOTE — DIETITIAN INITIAL EVALUATION ADULT. - PROBLEM SELECTOR PLAN 3
Unclear history of ABPA,  negative aspergillus ag.   - outpatient pulmonary follow up with Dr. Young

## 2021-07-08 NOTE — PROGRESS NOTE ADULT - ATTENDING COMMENTS
Agree with above  s/p Liver Bx by IR. Doing well.  Hepatology f/u appreciated.  Pt had an MRCP in April -- clarify if need a repeat?  MRI sinus scheduled for today  Chronic respiratory failure with hypercapnea and hypoxia, bronchiectasis and COPD  Pseudomonas infection, change from cefepime to po levaquin   Does not tolerate the chest vest. But using Aerobika, hypersal and duoneb  Chronic hypercapnea, was not sufficiently treated with bilevel, doing well with NIV  Pt requires home NIV in order to improve her respiratory mechanics and ventilation, improve elevated CO2 levels and hope to decrease clinical worsening and the need for hospital admission  d/c planning today or tomorrow  f/u with Dr. Young and hepatology  d/c pt's daughter on the phone, and  at bedside

## 2021-07-08 NOTE — PROGRESS NOTE ADULT - SUBJECTIVE AND OBJECTIVE BOX
CHIEF COMPLAINT: Patient is a 66y old  Female who presents with a chief complaint of SOB.    Interval Events:    REVIEW OF SYSTEMS:  [ ] All other systems negative  [ ] Unable to assess ROS because ________    OBJECTIVE:  ICU Vital Signs Last 24 Hrs  T(C): 36.6 (08 Jul 2021 06:02), Max: 36.6 (08 Jul 2021 06:02)  T(F): 97.9 (08 Jul 2021 06:02), Max: 97.9 (08 Jul 2021 06:02)  HR: 82 (08 Jul 2021 06:02) (76 - 98)  BP: 149/81 (08 Jul 2021 06:02) (135/83 - 150/72)  RR: 18 (08 Jul 2021 06:02) (16 - 18)  SpO2: 97% (08 Jul 2021 06:02) (95% - 100%)    Mode: NIV (Noninvasive Ventilation), RR (machine): 18, TV (machine): 380, FiO2: 28, PEEP: 5, ITime: 1, PIP: 20    07-06 @ 07:01  -  07-07 @ 07:00  --------------------------------------------------------  IN: 600 mL / OUT: 0 mL / NET: 600 mL    07-07 @ 07:01  -  07-08 @ 06:58  --------------------------------------------------------  IN: 290 mL / OUT: 0 mL / NET: 290 mL      CAPILLARY BLOOD GLUCOSE      POCT Blood Glucose.: 321 mg/dL (07 Jul 2021 21:44)      HOSPITAL MEDICATIONS:  MEDICATIONS  (STANDING):  albuterol/ipratropium for Nebulization 3 milliLiter(s) Nebulizer every 6 hours  amLODIPine   Tablet 5 milliGRAM(s) Oral daily  ascorbic acid 500 milliGRAM(s) Oral daily  budesonide 160 MICROgram(s)/formoterol 4.5 MICROgram(s) Inhaler 2 Puff(s) Inhalation two times a day  cefepime   IVPB 2000 milliGRAM(s) IV Intermittent every 12 hours  chlorhexidine 4% Liquid 1 Application(s) Topical daily  dextrose 40% Gel 15 Gram(s) Oral once  dextrose 5%. 1000 milliLiter(s) (50 mL/Hr) IV Continuous <Continuous>  dextrose 5%. 1000 milliLiter(s) (100 mL/Hr) IV Continuous <Continuous>  dextrose 50% Injectable 25 Gram(s) IV Push once  dextrose 50% Injectable 12.5 Gram(s) IV Push once  dextrose 50% Injectable 25 Gram(s) IV Push once  enoxaparin Injectable 40 milliGRAM(s) SubCutaneous daily  famotidine    Tablet 40 milliGRAM(s) Oral at bedtime  ferrous    sulfate 325 milliGRAM(s) Oral daily  fluticasone propionate 50 MICROgram(s)/spray Nasal Spray 2 Spray(s) Both Nostrils two times a day  glucagon  Injectable 1 milliGRAM(s) IntraMuscular once  insulin glargine Injectable (LANTUS) 15 Unit(s) SubCutaneous at bedtime  insulin lispro (ADMELOG) corrective regimen sliding scale   SubCutaneous three times a day before meals  insulin lispro (ADMELOG) corrective regimen sliding scale   SubCutaneous at bedtime  insulin lispro Injectable (ADMELOG) 8 Unit(s) SubCutaneous three times a day before meals  mirtazapine 7.5 milliGRAM(s) Oral at bedtime  montelukast 10 milliGRAM(s) Oral daily  pantoprazole    Tablet 40 milliGRAM(s) Oral before breakfast  polyethylene glycol 3350 17 Gram(s) Oral daily  senna 2 Tablet(s) Oral at bedtime  sodium chloride 0.65% Nasal 2 Spray(s) Both Nostrils every 12 hours  sodium chloride 3%  Inhalation 3 milliLiter(s) Inhalation every 6 hours    MEDICATIONS  (PRN):  acetaminophen   Tablet .. 650 milliGRAM(s) Oral every 4 hours PRN Mild Pain (1 - 3)  guaiFENesin Oral Liquid (Sugar-Free) 100 milliGRAM(s) Oral every 6 hours PRN Cough      LABS:                           MICROBIOLOGY:     RADIOLOGY:  [ ] Reviewed and interpreted by me    PULMONARY FUNCTION TESTS:    EKG: CHIEF COMPLAINT: Patient is a 66y old  Female who presents with a chief complaint of SOB.    Interval Events: No interval events noted overnight.     REVIEW OF SYSTEMS:  Denies fever, SOB, CP, abd pain, N/V  [x] All other systems negative    OBJECTIVE:  ICU Vital Signs Last 24 Hrs  T(C): 36.6 (08 Jul 2021 06:02), Max: 36.6 (08 Jul 2021 06:02)  T(F): 97.9 (08 Jul 2021 06:02), Max: 97.9 (08 Jul 2021 06:02)  HR: 82 (08 Jul 2021 06:02) (76 - 98)  BP: 149/81 (08 Jul 2021 06:02) (135/83 - 150/72)  RR: 18 (08 Jul 2021 06:02) (16 - 18)  SpO2: 97% (08 Jul 2021 06:02) (95% - 100%)    Mode: NIV (Noninvasive Ventilation), RR (machine): 18, TV (machine): 380, FiO2: 28, PEEP: 5, ITime: 1, PIP: 20    07-06 @ 07:01  -  07-07 @ 07:00  --------------------------------------------------------  IN: 600 mL / OUT: 0 mL / NET: 600 mL    07-07 @ 07:01  -  07-08 @ 06:58  --------------------------------------------------------  IN: 290 mL / OUT: 0 mL / NET: 290 mL      CAPILLARY BLOOD GLUCOSE      POCT Blood Glucose.: 321 mg/dL (07 Jul 2021 21:44)      HOSPITAL MEDICATIONS:  MEDICATIONS  (STANDING):  albuterol/ipratropium for Nebulization 3 milliLiter(s) Nebulizer every 6 hours  amLODIPine   Tablet 5 milliGRAM(s) Oral daily  ascorbic acid 500 milliGRAM(s) Oral daily  budesonide 160 MICROgram(s)/formoterol 4.5 MICROgram(s) Inhaler 2 Puff(s) Inhalation two times a day  cefepime   IVPB 2000 milliGRAM(s) IV Intermittent every 12 hours  chlorhexidine 4% Liquid 1 Application(s) Topical daily  dextrose 40% Gel 15 Gram(s) Oral once  dextrose 5%. 1000 milliLiter(s) (50 mL/Hr) IV Continuous <Continuous>  dextrose 5%. 1000 milliLiter(s) (100 mL/Hr) IV Continuous <Continuous>  dextrose 50% Injectable 25 Gram(s) IV Push once  dextrose 50% Injectable 12.5 Gram(s) IV Push once  dextrose 50% Injectable 25 Gram(s) IV Push once  enoxaparin Injectable 40 milliGRAM(s) SubCutaneous daily  famotidine    Tablet 40 milliGRAM(s) Oral at bedtime  ferrous    sulfate 325 milliGRAM(s) Oral daily  fluticasone propionate 50 MICROgram(s)/spray Nasal Spray 2 Spray(s) Both Nostrils two times a day  glucagon  Injectable 1 milliGRAM(s) IntraMuscular once  insulin glargine Injectable (LANTUS) 15 Unit(s) SubCutaneous at bedtime  insulin lispro (ADMELOG) corrective regimen sliding scale   SubCutaneous three times a day before meals  insulin lispro (ADMELOG) corrective regimen sliding scale   SubCutaneous at bedtime  insulin lispro Injectable (ADMELOG) 8 Unit(s) SubCutaneous three times a day before meals  mirtazapine 7.5 milliGRAM(s) Oral at bedtime  montelukast 10 milliGRAM(s) Oral daily  pantoprazole    Tablet 40 milliGRAM(s) Oral before breakfast  polyethylene glycol 3350 17 Gram(s) Oral daily  senna 2 Tablet(s) Oral at bedtime  sodium chloride 0.65% Nasal 2 Spray(s) Both Nostrils every 12 hours  sodium chloride 3%  Inhalation 3 milliLiter(s) Inhalation every 6 hours    MEDICATIONS  (PRN):  acetaminophen   Tablet .. 650 milliGRAM(s) Oral every 4 hours PRN Mild Pain (1 - 3)  guaiFENesin Oral Liquid (Sugar-Free) 100 milliGRAM(s) Oral every 6 hours PRN Cough      LABS:                          MICROBIOLOGY:     RADIOLOGY:  [ ] Reviewed and interpreted by me    PULMONARY FUNCTION TESTS:    EKG:

## 2021-07-09 LAB
GLUCOSE BLDC GLUCOMTR-MCNC: 109 MG/DL — HIGH (ref 70–99)
GLUCOSE BLDC GLUCOMTR-MCNC: 113 MG/DL — HIGH (ref 70–99)
GLUCOSE BLDC GLUCOMTR-MCNC: 128 MG/DL — HIGH (ref 70–99)
GLUCOSE BLDC GLUCOMTR-MCNC: 224 MG/DL — HIGH (ref 70–99)
GLUCOSE BLDC GLUCOMTR-MCNC: 95 MG/DL — SIGNIFICANT CHANGE UP (ref 70–99)

## 2021-07-09 PROCEDURE — 99233 SBSQ HOSP IP/OBS HIGH 50: CPT | Mod: GC

## 2021-07-09 RX ADMIN — CHLORHEXIDINE GLUCONATE 1 APPLICATION(S): 213 SOLUTION TOPICAL at 11:09

## 2021-07-09 RX ADMIN — MIRTAZAPINE 7.5 MILLIGRAM(S): 45 TABLET, ORALLY DISINTEGRATING ORAL at 21:15

## 2021-07-09 RX ADMIN — SODIUM CHLORIDE 3 MILLILITER(S): 9 INJECTION INTRAMUSCULAR; INTRAVENOUS; SUBCUTANEOUS at 04:08

## 2021-07-09 RX ADMIN — ENOXAPARIN SODIUM 40 MILLIGRAM(S): 100 INJECTION SUBCUTANEOUS at 11:08

## 2021-07-09 RX ADMIN — Medication 8 UNIT(S): at 08:21

## 2021-07-09 RX ADMIN — Medication 2 SPRAY(S): at 16:48

## 2021-07-09 RX ADMIN — INSULIN GLARGINE 15 UNIT(S): 100 INJECTION, SOLUTION SUBCUTANEOUS at 21:15

## 2021-07-09 RX ADMIN — Medication 2 SPRAY(S): at 05:56

## 2021-07-09 RX ADMIN — Medication 500 MILLIGRAM(S): at 11:08

## 2021-07-09 RX ADMIN — SODIUM CHLORIDE 3 MILLILITER(S): 9 INJECTION INTRAMUSCULAR; INTRAVENOUS; SUBCUTANEOUS at 10:34

## 2021-07-09 RX ADMIN — CEFEPIME 100 MILLIGRAM(S): 1 INJECTION, POWDER, FOR SOLUTION INTRAMUSCULAR; INTRAVENOUS at 05:56

## 2021-07-09 RX ADMIN — MONTELUKAST 10 MILLIGRAM(S): 4 TABLET, CHEWABLE ORAL at 11:07

## 2021-07-09 RX ADMIN — Medication 4: at 17:21

## 2021-07-09 RX ADMIN — BUDESONIDE AND FORMOTEROL FUMARATE DIHYDRATE 2 PUFF(S): 160; 4.5 AEROSOL RESPIRATORY (INHALATION) at 10:49

## 2021-07-09 RX ADMIN — Medication 3 MILLILITER(S): at 10:34

## 2021-07-09 RX ADMIN — FAMOTIDINE 40 MILLIGRAM(S): 10 INJECTION INTRAVENOUS at 21:15

## 2021-07-09 RX ADMIN — PANTOPRAZOLE SODIUM 40 MILLIGRAM(S): 20 TABLET, DELAYED RELEASE ORAL at 08:21

## 2021-07-09 RX ADMIN — Medication 8 UNIT(S): at 17:23

## 2021-07-09 RX ADMIN — POLYETHYLENE GLYCOL 3350 17 GRAM(S): 17 POWDER, FOR SOLUTION ORAL at 11:09

## 2021-07-09 RX ADMIN — BUDESONIDE AND FORMOTEROL FUMARATE DIHYDRATE 2 PUFF(S): 160; 4.5 AEROSOL RESPIRATORY (INHALATION) at 22:20

## 2021-07-09 RX ADMIN — CEFEPIME 100 MILLIGRAM(S): 1 INJECTION, POWDER, FOR SOLUTION INTRAMUSCULAR; INTRAVENOUS at 16:49

## 2021-07-09 RX ADMIN — Medication 8 UNIT(S): at 12:15

## 2021-07-09 RX ADMIN — SENNA PLUS 2 TABLET(S): 8.6 TABLET ORAL at 21:15

## 2021-07-09 RX ADMIN — AMLODIPINE BESYLATE 5 MILLIGRAM(S): 2.5 TABLET ORAL at 06:33

## 2021-07-09 RX ADMIN — SODIUM CHLORIDE 3 MILLILITER(S): 9 INJECTION INTRAMUSCULAR; INTRAVENOUS; SUBCUTANEOUS at 22:25

## 2021-07-09 RX ADMIN — Medication 325 MILLIGRAM(S): at 11:07

## 2021-07-09 RX ADMIN — Medication 3 MILLILITER(S): at 22:15

## 2021-07-09 RX ADMIN — Medication 3 MILLILITER(S): at 04:07

## 2021-07-09 RX ADMIN — Medication 30 MILLILITER(S): at 16:48

## 2021-07-09 NOTE — CHART NOTE - NSCHARTNOTEFT_GEN_A_CORE
Pt was not seen by me personally today. Chart was reviewed. Prelim pathology showing no cirrhosis, but still awaiting final pathology. Pt will need to be seen in OP hepatology clinic for follow up. Pt discussed with Dr. Loja who agrees with plan.    Kailey Lewis MD PGY-4  Gastroenterology Fellow  Pager #[phone] ([facility]) or [phone] ([facility])  Available on [employer] Teams.  Please contact on-call GI fellow via answering service ([phone]) after 5pm and before 8am, and on weekends. Pt was not seen by me personally today. Chart was reviewed. Prelim pathology showing no cirrhosis, but possibly some fibrosis. Per pathologist, bx sample was limited and fragmented. Still awaiting final pathology. Pt will need to be seen in OP hepatology clinic for follow up. Would also benefit from OP fibroscan (unlikely that pt would be able to tolerate MR elastography given difficulty with laying flat/still with underlying respiratory pathology. Pt discussed with Dr. Loja who agrees with plan.    Kailey Lewis MD PGY-4  Gastroenterology Fellow  Pager #[phone] ([facility]) or [phone] ([facility])  Available on [employer] Teams.  Please contact on-call GI fellow via answering service ([phone]) after 5pm and before 8am, and on weekends.

## 2021-07-09 NOTE — PROGRESS NOTE ADULT - SUBJECTIVE AND OBJECTIVE BOX
CHIEF COMPLAINT:Patient is a 66y old  Female who presents with a chief complaint of SOB (08 Jul 2021 17:14)    INTERVAL EVENTS:     ROS: Seen by bedside during AM rounds     OBJECTIVE:  ICU Vital Signs Last 24 Hrs  T(C): 36.9 (09 Jul 2021 05:51), Max: 37.1 (08 Jul 2021 19:00)  T(F): 98.4 (09 Jul 2021 05:51), Max: 98.7 (08 Jul 2021 19:00)  HR: 80 (09 Jul 2021 07:13) (79 - 106)  BP: 149/76 (09 Jul 2021 05:51) (135/59 - 154/87)  BP(mean): --  ABP: --  ABP(mean): --  RR: 21 (09 Jul 2021 05:51) (17 - 22)  SpO2: 97% (09 Jul 2021 07:13) (94% - 99%)    Mode: NIV (Noninvasive Ventilation), RR (machine): 18, TV (machine): 380, FiO2: 28, PEEP: 5, ITime: 1, PIP: 15    07-08 @ 07:01  -  07-09 @ 07:00  --------------------------------------------------------  IN: 1260 mL / OUT: 0 mL / NET: 1260 mL      CAPILLARY BLOOD GLUCOSE      POCT Blood Glucose.: 268 mg/dL (08 Jul 2021 21:16)      PHYSICAL EXAM:  General:   HEENT:   Lymph Nodes:  Neck:   Respiratory:   Cardiovascular:   Abdomen:   Extremities:   Skin:   Neurological:  Psychiatry:    Mode: NIV (Noninvasive Ventilation)  RR (machine): 18  TV (machine): 380  FiO2: 28  PEEP: 5  ITime: 1  PIP: 15      HOSPITAL MEDICATIONS:  MEDICATIONS  (STANDING):  albuterol/ipratropium for Nebulization 3 milliLiter(s) Nebulizer every 6 hours  amLODIPine   Tablet 5 milliGRAM(s) Oral daily  ascorbic acid 500 milliGRAM(s) Oral daily  budesonide 160 MICROgram(s)/formoterol 4.5 MICROgram(s) Inhaler 2 Puff(s) Inhalation two times a day  cefepime   IVPB 2000 milliGRAM(s) IV Intermittent every 12 hours  chlorhexidine 4% Liquid 1 Application(s) Topical daily  dextrose 40% Gel 15 Gram(s) Oral once  dextrose 5%. 1000 milliLiter(s) (50 mL/Hr) IV Continuous <Continuous>  dextrose 5%. 1000 milliLiter(s) (100 mL/Hr) IV Continuous <Continuous>  dextrose 50% Injectable 25 Gram(s) IV Push once  dextrose 50% Injectable 12.5 Gram(s) IV Push once  dextrose 50% Injectable 25 Gram(s) IV Push once  enoxaparin Injectable 40 milliGRAM(s) SubCutaneous daily  famotidine    Tablet 40 milliGRAM(s) Oral at bedtime  ferrous    sulfate 325 milliGRAM(s) Oral daily  fluticasone propionate 50 MICROgram(s)/spray Nasal Spray 2 Spray(s) Both Nostrils two times a day  glucagon  Injectable 1 milliGRAM(s) IntraMuscular once  insulin glargine Injectable (LANTUS) 15 Unit(s) SubCutaneous at bedtime  insulin lispro (ADMELOG) corrective regimen sliding scale   SubCutaneous three times a day before meals  insulin lispro (ADMELOG) corrective regimen sliding scale   SubCutaneous at bedtime  insulin lispro Injectable (ADMELOG) 8 Unit(s) SubCutaneous three times a day before meals  mirtazapine 7.5 milliGRAM(s) Oral at bedtime  montelukast 10 milliGRAM(s) Oral daily  pantoprazole    Tablet 40 milliGRAM(s) Oral before breakfast  polyethylene glycol 3350 17 Gram(s) Oral daily  senna 2 Tablet(s) Oral at bedtime  sodium chloride 0.65% Nasal 2 Spray(s) Both Nostrils every 12 hours  sodium chloride 3%  Inhalation 3 milliLiter(s) Inhalation every 6 hours    MEDICATIONS  (PRN):  acetaminophen   Tablet .. 650 milliGRAM(s) Oral every 4 hours PRN Mild Pain (1 - 3)  guaiFENesin Oral Liquid (Sugar-Free) 100 milliGRAM(s) Oral every 6 hours PRN Cough      LABS:                        11.2   7.26  )-----------( 148      ( 08 Jul 2021 06:58 )             37.3     07-08    137  |  98  |  11  ----------------------------<  77  4.4   |  28  |  0.33<L>    Ca    9.4      08 Jul 2021 06:58  Phos  3.3     07-08  Mg     2.00     07-08    TPro  6.3  /  Alb  2.9<L>  /  TBili  0.2  /  DBili  x   /  AST  46<H>  /  ALT  38<H>  /  AlkPhos  141<H>  07-08           CHIEF COMPLAINT:Patient is a 66y old  Female who presents with a chief complaint of SOB (08 Jul 2021 17:14)    INTERVAL EVENTS: no acute events overnight. Sitting upright in stretcher in NAD.     ROS: See above, remaining ROS negative.    OBJECTIVE:  ICU Vital Signs Last 24 Hrs  T(C): 36.9 (09 Jul 2021 05:51), Max: 37.1 (08 Jul 2021 19:00)  T(F): 98.4 (09 Jul 2021 05:51), Max: 98.7 (08 Jul 2021 19:00)  HR: 80 (09 Jul 2021 07:13) (79 - 106)  BP: 149/76 (09 Jul 2021 05:51) (135/59 - 154/87)  BP(mean): --  ABP: --  ABP(mean): --  RR: 21 (09 Jul 2021 05:51) (17 - 22)  SpO2: 97% (09 Jul 2021 07:13) (94% - 99%)    Mode: NIV (Noninvasive Ventilation), RR (machine): 18, TV (machine): 380, FiO2: 28, PEEP: 5, ITime: 1, PIP: 15    07-08 @ 07:01  -  07-09 @ 07:00  --------------------------------------------------------  IN: 1260 mL / OUT: 0 mL / NET: 1260 mL      CAPILLARY BLOOD GLUCOSE      POCT Blood Glucose.: 268 mg/dL (08 Jul 2021 21:16)      PHYSICAL EXAM:  General: NAD   Cards: S1/S2, no murmurs   Pulm: Unlabored respiratory effort.   Abdomen: Soft, NTND.   Extremities: No pedal edema. CATRINA of BL upper and lower extremities.  Neurology: AOx3 with no focal neurological deficits       Mode: NIV (Noninvasive Ventilation)  RR (machine): 18  TV (machine): 380  FiO2: 28  PEEP: 5  ITime: 1  PIP: 15      HOSPITAL MEDICATIONS:  MEDICATIONS  (STANDING):  albuterol/ipratropium for Nebulization 3 milliLiter(s) Nebulizer every 6 hours  amLODIPine   Tablet 5 milliGRAM(s) Oral daily  ascorbic acid 500 milliGRAM(s) Oral daily  budesonide 160 MICROgram(s)/formoterol 4.5 MICROgram(s) Inhaler 2 Puff(s) Inhalation two times a day  cefepime   IVPB 2000 milliGRAM(s) IV Intermittent every 12 hours  chlorhexidine 4% Liquid 1 Application(s) Topical daily  dextrose 40% Gel 15 Gram(s) Oral once  dextrose 5%. 1000 milliLiter(s) (50 mL/Hr) IV Continuous <Continuous>  dextrose 5%. 1000 milliLiter(s) (100 mL/Hr) IV Continuous <Continuous>  dextrose 50% Injectable 25 Gram(s) IV Push once  dextrose 50% Injectable 12.5 Gram(s) IV Push once  dextrose 50% Injectable 25 Gram(s) IV Push once  enoxaparin Injectable 40 milliGRAM(s) SubCutaneous daily  famotidine    Tablet 40 milliGRAM(s) Oral at bedtime  ferrous    sulfate 325 milliGRAM(s) Oral daily  fluticasone propionate 50 MICROgram(s)/spray Nasal Spray 2 Spray(s) Both Nostrils two times a day  glucagon  Injectable 1 milliGRAM(s) IntraMuscular once  insulin glargine Injectable (LANTUS) 15 Unit(s) SubCutaneous at bedtime  insulin lispro (ADMELOG) corrective regimen sliding scale   SubCutaneous three times a day before meals  insulin lispro (ADMELOG) corrective regimen sliding scale   SubCutaneous at bedtime  insulin lispro Injectable (ADMELOG) 8 Unit(s) SubCutaneous three times a day before meals  mirtazapine 7.5 milliGRAM(s) Oral at bedtime  montelukast 10 milliGRAM(s) Oral daily  pantoprazole    Tablet 40 milliGRAM(s) Oral before breakfast  polyethylene glycol 3350 17 Gram(s) Oral daily  senna 2 Tablet(s) Oral at bedtime  sodium chloride 0.65% Nasal 2 Spray(s) Both Nostrils every 12 hours  sodium chloride 3%  Inhalation 3 milliLiter(s) Inhalation every 6 hours    MEDICATIONS  (PRN):  acetaminophen   Tablet .. 650 milliGRAM(s) Oral every 4 hours PRN Mild Pain (1 - 3)  guaiFENesin Oral Liquid (Sugar-Free) 100 milliGRAM(s) Oral every 6 hours PRN Cough      LABS:                        11.2   7.26  )-----------( 148      ( 08 Jul 2021 06:58 )             37.3     07-08    137  |  98  |  11  ----------------------------<  77  4.4   |  28  |  0.33<L>    Ca    9.4      08 Jul 2021 06:58  Phos  3.3     07-08  Mg     2.00     07-08    TPro  6.3  /  Alb  2.9<L>  /  TBili  0.2  /  DBili  x   /  AST  46<H>  /  ALT  38<H>  /  AlkPhos  141<H>  07-08

## 2021-07-09 NOTE — PROGRESS NOTE ADULT - ATTENDING COMMENTS
agree with above  pseudomonas in office was resist to levaquin  will keep on cefepime through 7/13  and still awaiting avaps approval  MRI sinus done, results pending  Pending MRCP as well.    Pt with a history of bronchiectasis and COPD, admitted with exacerbation secondary to pseudomonas  Chronic respiratory failure with hypoxia and hypercapnea  Patient was tried with bilevel 16/5 cmH2O without adequate response of PCO2  She was transitioned to AVAPS, and has done well.  Patient will benefit from home AVAPS for volume augmented ventilation, to improve ventilation and PCO2 levels and decrease admissions agree with above  pseudomonas in office was resist to levaquin  will keep on cefepime through 7/13  and still awaiting avaps approval  MRI sinus done, results pending  Pending MRCP as well.  Pt has not received the COVID vaccine. Per her daughter, she was never feeling well enough to get it...  Will plan for JJ on discharge    Pt with a history of bronchiectasis and COPD, admitted with exacerbation secondary to pseudomonas  Chronic respiratory failure with hypoxia and hypercapnea  Patient was tried with bilevel 16/5 cmH2O without adequate response of PCO2  She was transitioned to AVAPS, and has done well.  Patient will benefit from home AVAPS for volume augmented ventilation, to improve ventilation and PCO2 levels and decrease admissions

## 2021-07-09 NOTE — PROGRESS NOTE ADULT - ASSESSMENT
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 Patient now presented from Pulmonary Office, Dr. Young for worsening SOB, lethargy and cough with thick yellow secretions. Now readmitted for acute on chronic hypercarbic respiratory failure likely in setting of bronchiectasis exacerbation with recurrent PNA.     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   - CT CHEST 6/30 with (+) bronchiectasis   - SCx 6/30 with Pseudomonas Aeurginosa  - Continue on aggressive chest PT with Mucomyst, Duoneb and Chest Vest Q6H   - Continue on Symbicort BID and Singular QD   - s/p Prednisone 40mg QD (6/30 - 7/3)   - s/p Avycaz/ NELLY (6/30 - 7/2) and c/w Cefepime (7/2- until 7/14); Can dc on levaquin  - Hypercarbia improved and now continued on NC QD and AVAPs QHS.   - Will need home NIV set up prior to DC and f/u with Dr. Young outpatient for possible Lung transplant    # Acute on Chronic Sinusitis   - CT SINUS 6/30 with extensive inflammatory changes throughout the paranasal sinuses and their respective drainage pathways, , improved in the right frontal and sphenoid sinuses since comparison. 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 Cannot rule out CSF leak. Thinning of the left carotid canal wall along the posterior left sphenoid sinus. Cannot rule out dehiscence.  - Continue on Flonase and [place] BID   - MRI Sinuses pending   - ENT following    # Nodular Liver with possible Cirrhosis ?   - During admission in April patient was found to have nodular contour of liver on US 4/6/2021. Upon further discussion with patient and Hepatology, she was noted to have history of blood transfusion in 1998 with (+) Hep C AB with negative Hep C RNA. She also reports history of CBD stricture in 1998 in [place], s/p PTC/PTBD.   - CLD work up previously noted with HBsAg negative, HBcIgM negative, Hep E IgG negative, Hep A IgM negative, RUPESH, Anti-LKM and AMA negative, Ferritin 169, AFP within normal limits, ASMA 1:40. MR/MRCP without stricturing or hepatic lesions. No ascites on all imaging and no history of HE. No known varices/ no EGD record on record.   - Echo - EF 73%, unremarkable   - Hepatology consulted, recs apreciated  - S/P liver bx 7/7; f/u results  - Follow up in Hepatology Fellow clinic.    # IDDM2, A1C 8.9 (6/2021)   - At home on: Lantus 14U QHS and sliding scale  - In house on: Lantus 15U QHS and Lispro 8U + Moderate ISS in house while on steroids.   - Pt with episode of hypoglycemia at lunch today, per nursing staff pt did eat her breakfast this am  - Will trend FS: adjustments will be made after evaluating dinner time FS if warranted     # Ethics/ DISPO/ GOC  - FULL CODE and GOC discussed with Son, Michela ([phone]) who expresses wishes of DNI, however will discuss further for DNR. Family wishes for all medical treatment (including invasive and possible lung transplant) to be performed, however if her condition was to worsen they do not wish for intubation. GOC conversation ongoing.   - PT recommends home with home PT   - DC planning tomorrow home 7/9 after MRI Sinuses.    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 Patient now presented from Pulmonary Office, Dr. Young for worsening SOB, lethargy and cough with thick yellow secretions. Now readmitted for acute on chronic hypercarbic respiratory failure likely in setting of bronchiectasis exacerbation with recurrent PNA.     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   - CT CHEST 6/30 with (+) bronchiectasis   - SCx 6/30 with Pseudomonas Aeurginosa  - Continue on aggressive chest PT with Mucomyst, Duoneb and Chest Vest Q6H   - Continue on Symbicort BID and Singular QD   - s/p Prednisone 40mg QD (6/30 - 7/3)   - s/p Avycaz/ NELLY (6/30 - 7/2) and c/w Cefepime (7/2- until 7/14); Can dc on levaquin  - Hypercarbia improved and now continued on NC QD and AVAPs QHS.   - Will need home NIV set up prior to DC   - Has outpatient f/u appt with Dr. Young on 7/15 per Chart Review for possible Lung transplant    # Acute on Chronic Sinusitis   - CT SINUS 6/30 with extensive inflammatory changes throughout the paranasal sinuses and their respective drainage pathways, , improved in the right frontal and sphenoid sinuses since comparison. 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 Cannot rule out CSF leak. Thinning of the left carotid canal wall along the posterior left sphenoid sinus. Cannot rule out dehiscence.  - Continue on Flonase and [place] BID   - MRI Sinuses- No definite evidence of CSF leak.  - ENT following    # Nodular Liver with possible Cirrhosis ?   - During admission in April patient was found to have nodular contour of liver on US 4/6/2021. Upon further discussion with patient and Hepatology, she was noted to have history of blood transfusion in 1998 with (+) Hep C AB with negative Hep C RNA. She also reports history of CBD stricture in 1998 in [place], s/p PTC/PTBD.   - CLD work up previously noted with HBsAg negative, HBcIgM negative, Hep E IgG negative, Hep A IgM negative, RUPESH, Anti-LKM and AMA negative, Ferritin 169, AFP within normal limits, ASMA 1:40. MR/MRCP without stricturing or hepatic lesions. No ascites on all imaging and no history of HE. No known varices/ no EGD record on record.   - Echo - EF 73%, unremarkable   - Hepatology consulted, recs apreciated  - S/P liver bx 7/7; prelim results with evidence of hepatic fibrosis but no cirrhosis- f/u final results  - Follow up in Hepatology Fellow clinic.  - MRCP to be done in-house    # IDDM2, A1C 8.9 (6/2021)   - At home on: Lantus 14U QHS and sliding scale  - In house on: Lantus 15U QHS and Lispro 8U + Moderate ISS in house while on steroids.   - Will trend FS: adjustments will be made after evaluating dinner time FS if warranted     # Ethics/ DISPO/ GOC  - FULL CODE and GOC discussed with Son, Michela ([phone]) who expresses wishes of DNI, however will discuss further for DNR. Family wishes for all medical treatment (including invasive and possible lung transplant) to be performed, however if her condition was to worsen they do not wish for intubation. GOC conversation ongoing.   - PT recommends home with home PT   - Awaiting approval for home NIV machine  - DC planning likely early next week pending approval for NIV machine and completion of IV abx. Will plan for J&J COVID19 vaccine on discharge date.    # DVT PPX -Lovenox

## 2021-07-10 LAB
ANION GAP SERPL CALC-SCNC: 10 MMOL/L — SIGNIFICANT CHANGE UP (ref 7–14)
BUN SERPL-MCNC: 15 MG/DL — SIGNIFICANT CHANGE UP (ref 7–23)
CALCIUM SERPL-MCNC: 9.7 MG/DL — SIGNIFICANT CHANGE UP (ref 8.4–10.5)
CHLORIDE SERPL-SCNC: 96 MMOL/L — LOW (ref 98–107)
CO2 SERPL-SCNC: 32 MMOL/L — HIGH (ref 22–31)
CREAT SERPL-MCNC: 0.4 MG/DL — LOW (ref 0.5–1.3)
GLUCOSE BLDC GLUCOMTR-MCNC: 127 MG/DL — HIGH (ref 70–99)
GLUCOSE BLDC GLUCOMTR-MCNC: 159 MG/DL — HIGH (ref 70–99)
GLUCOSE BLDC GLUCOMTR-MCNC: 164 MG/DL — HIGH (ref 70–99)
GLUCOSE BLDC GLUCOMTR-MCNC: 94 MG/DL — SIGNIFICANT CHANGE UP (ref 70–99)
GLUCOSE SERPL-MCNC: 132 MG/DL — HIGH (ref 70–99)
GRAM STN FLD: SIGNIFICANT CHANGE UP
HCT VFR BLD CALC: 38.4 % — SIGNIFICANT CHANGE UP (ref 34.5–45)
HGB BLD-MCNC: 11.8 G/DL — SIGNIFICANT CHANGE UP (ref 11.5–15.5)
MAGNESIUM SERPL-MCNC: 1.8 MG/DL — SIGNIFICANT CHANGE UP (ref 1.6–2.6)
MCHC RBC-ENTMCNC: 26.9 PG — LOW (ref 27–34)
MCHC RBC-ENTMCNC: 30.7 GM/DL — LOW (ref 32–36)
MCV RBC AUTO: 87.7 FL — SIGNIFICANT CHANGE UP (ref 80–100)
NRBC # BLD: 0 /100 WBCS — SIGNIFICANT CHANGE UP
NRBC # FLD: 0 K/UL — SIGNIFICANT CHANGE UP
PHOSPHATE SERPL-MCNC: 1.7 MG/DL — LOW (ref 2.5–4.5)
PLATELET # BLD AUTO: 157 K/UL — SIGNIFICANT CHANGE UP (ref 150–400)
POTASSIUM SERPL-MCNC: 3.9 MMOL/L — SIGNIFICANT CHANGE UP (ref 3.5–5.3)
POTASSIUM SERPL-SCNC: 3.9 MMOL/L — SIGNIFICANT CHANGE UP (ref 3.5–5.3)
RBC # BLD: 4.38 M/UL — SIGNIFICANT CHANGE UP (ref 3.8–5.2)
RBC # FLD: 13.6 % — SIGNIFICANT CHANGE UP (ref 10.3–14.5)
SODIUM SERPL-SCNC: 138 MMOL/L — SIGNIFICANT CHANGE UP (ref 135–145)
SPECIMEN SOURCE: SIGNIFICANT CHANGE UP
WBC # BLD: 18.03 K/UL — HIGH (ref 3.8–10.5)
WBC # FLD AUTO: 18.03 K/UL — HIGH (ref 3.8–10.5)

## 2021-07-10 PROCEDURE — 99233 SBSQ HOSP IP/OBS HIGH 50: CPT | Mod: GC

## 2021-07-10 PROCEDURE — 71045 X-RAY EXAM CHEST 1 VIEW: CPT | Mod: 26

## 2021-07-10 RX ORDER — ACETAMINOPHEN 500 MG
650 TABLET ORAL EVERY 6 HOURS
Refills: 0 | Status: DISCONTINUED | OUTPATIENT
Start: 2021-07-10 | End: 2021-07-19

## 2021-07-10 RX ORDER — VANCOMYCIN HCL 1 G
500 VIAL (EA) INTRAVENOUS ONCE
Refills: 0 | Status: COMPLETED | OUTPATIENT
Start: 2021-07-10 | End: 2021-07-10

## 2021-07-10 RX ORDER — POTASSIUM PHOSPHATE, MONOBASIC POTASSIUM PHOSPHATE, DIBASIC 236; 224 MG/ML; MG/ML
30 INJECTION, SOLUTION INTRAVENOUS ONCE
Refills: 0 | Status: COMPLETED | OUTPATIENT
Start: 2021-07-10 | End: 2021-07-10

## 2021-07-10 RX ORDER — VANCOMYCIN HCL 1 G
VIAL (EA) INTRAVENOUS
Refills: 0 | Status: DISCONTINUED | OUTPATIENT
Start: 2021-07-10 | End: 2021-07-12

## 2021-07-10 RX ORDER — VANCOMYCIN HCL 1 G
500 VIAL (EA) INTRAVENOUS EVERY 12 HOURS
Refills: 0 | Status: DISCONTINUED | OUTPATIENT
Start: 2021-07-11 | End: 2021-07-12

## 2021-07-10 RX ADMIN — SODIUM CHLORIDE 3 MILLILITER(S): 9 INJECTION INTRAMUSCULAR; INTRAVENOUS; SUBCUTANEOUS at 10:48

## 2021-07-10 RX ADMIN — Medication 2 SPRAY(S): at 05:18

## 2021-07-10 RX ADMIN — Medication 8 UNIT(S): at 08:18

## 2021-07-10 RX ADMIN — INSULIN GLARGINE 15 UNIT(S): 100 INJECTION, SOLUTION SUBCUTANEOUS at 22:28

## 2021-07-10 RX ADMIN — POTASSIUM PHOSPHATE, MONOBASIC POTASSIUM PHOSPHATE, DIBASIC 83.33 MILLIMOLE(S): 236; 224 INJECTION, SOLUTION INTRAVENOUS at 15:11

## 2021-07-10 RX ADMIN — CEFEPIME 100 MILLIGRAM(S): 1 INJECTION, POWDER, FOR SOLUTION INTRAMUSCULAR; INTRAVENOUS at 17:55

## 2021-07-10 RX ADMIN — Medication 3 MILLILITER(S): at 10:48

## 2021-07-10 RX ADMIN — Medication 8 UNIT(S): at 11:39

## 2021-07-10 RX ADMIN — Medication 2 SPRAY(S): at 17:53

## 2021-07-10 RX ADMIN — Medication 3 MILLILITER(S): at 22:11

## 2021-07-10 RX ADMIN — Medication 8 UNIT(S): at 17:53

## 2021-07-10 RX ADMIN — Medication 650 MILLIGRAM(S): at 12:47

## 2021-07-10 RX ADMIN — Medication 650 MILLIGRAM(S): at 12:45

## 2021-07-10 RX ADMIN — Medication 2 SPRAY(S): at 17:54

## 2021-07-10 RX ADMIN — SODIUM CHLORIDE 3 MILLILITER(S): 9 INJECTION INTRAMUSCULAR; INTRAVENOUS; SUBCUTANEOUS at 16:36

## 2021-07-10 RX ADMIN — SODIUM CHLORIDE 3 MILLILITER(S): 9 INJECTION INTRAMUSCULAR; INTRAVENOUS; SUBCUTANEOUS at 22:12

## 2021-07-10 RX ADMIN — Medication 100 MILLIGRAM(S): at 19:00

## 2021-07-10 RX ADMIN — AMLODIPINE BESYLATE 5 MILLIGRAM(S): 2.5 TABLET ORAL at 05:18

## 2021-07-10 RX ADMIN — SODIUM CHLORIDE 3 MILLILITER(S): 9 INJECTION INTRAMUSCULAR; INTRAVENOUS; SUBCUTANEOUS at 03:24

## 2021-07-10 RX ADMIN — PANTOPRAZOLE SODIUM 40 MILLIGRAM(S): 20 TABLET, DELAYED RELEASE ORAL at 05:19

## 2021-07-10 RX ADMIN — CHLORHEXIDINE GLUCONATE 1 APPLICATION(S): 213 SOLUTION TOPICAL at 11:27

## 2021-07-10 RX ADMIN — BUDESONIDE AND FORMOTEROL FUMARATE DIHYDRATE 2 PUFF(S): 160; 4.5 AEROSOL RESPIRATORY (INHALATION) at 10:48

## 2021-07-10 RX ADMIN — FAMOTIDINE 40 MILLIGRAM(S): 10 INJECTION INTRAVENOUS at 22:34

## 2021-07-10 RX ADMIN — Medication 2: at 17:52

## 2021-07-10 RX ADMIN — Medication 3 MILLILITER(S): at 03:23

## 2021-07-10 RX ADMIN — Medication 325 MILLIGRAM(S): at 11:26

## 2021-07-10 RX ADMIN — Medication 500 MILLIGRAM(S): at 11:26

## 2021-07-10 RX ADMIN — CEFEPIME 100 MILLIGRAM(S): 1 INJECTION, POWDER, FOR SOLUTION INTRAMUSCULAR; INTRAVENOUS at 05:17

## 2021-07-10 RX ADMIN — ENOXAPARIN SODIUM 40 MILLIGRAM(S): 100 INJECTION SUBCUTANEOUS at 11:26

## 2021-07-10 RX ADMIN — SENNA PLUS 2 TABLET(S): 8.6 TABLET ORAL at 22:34

## 2021-07-10 RX ADMIN — MONTELUKAST 10 MILLIGRAM(S): 4 TABLET, CHEWABLE ORAL at 11:26

## 2021-07-10 RX ADMIN — Medication 3 MILLILITER(S): at 16:36

## 2021-07-10 RX ADMIN — MIRTAZAPINE 7.5 MILLIGRAM(S): 45 TABLET, ORALLY DISINTEGRATING ORAL at 22:33

## 2021-07-10 RX ADMIN — BUDESONIDE AND FORMOTEROL FUMARATE DIHYDRATE 2 PUFF(S): 160; 4.5 AEROSOL RESPIRATORY (INHALATION) at 22:10

## 2021-07-10 NOTE — PROGRESS NOTE ADULT - ASSESSMENT
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 Patient now presented from Pulmonary Office, Dr. Young for worsening SOB, lethargy and cough with thick yellow secretions. Now readmitted for acute on chronic hypercarbic respiratory failure likely in setting of bronchiectasis exacerbation with recurrent PNA.     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   - CT CHEST 6/30 with (+) bronchiectasis   - SCx 6/30 with Pseudomonas Aeurginosa  - Continue on aggressive chest PT with Mucomyst, Duoneb and Chest Vest Q6H   - Continue on Symbicort BID and Singular QD   - s/p Prednisone 40mg QD (6/30 - 7/3)   - s/p Avycaz/ NELLY (6/30 - 7/2) and c/w Cefepime (7/2- until 7/14); Can dc on levaquin  - Hypercarbia improved and now continued on NC QD and AVAPs QHS.   - Will need home NIV set up prior to DC   - Has outpatient f/u appt with Dr. Young on 7/15 per Chart Review for possible Lung transplant    # Acute on Chronic Sinusitis   - CT SINUS 6/30 with extensive inflammatory changes throughout the paranasal sinuses and their respective drainage pathways, , improved in the right frontal and sphenoid sinuses since comparison. 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 Cannot rule out CSF leak. Thinning of the left carotid canal wall along the posterior left sphenoid sinus. Cannot rule out dehiscence.  - Continue on Flonase and [place] BID   - MRI Sinuses- No definite evidence of CSF leak.  - ENT following    # Nodular Liver with possible Cirrhosis ?   - During admission in April patient was found to have nodular contour of liver on US 4/6/2021. Upon further discussion with patient and Hepatology, she was noted to have history of blood transfusion in 1998 with (+) Hep C AB with negative Hep C RNA. She also reports history of CBD stricture in 1998 in [place], s/p PTC/PTBD.   - CLD work up previously noted with HBsAg negative, HBcIgM negative, Hep E IgG negative, Hep A IgM negative, RUPESH, Anti-LKM and AMA negative, Ferritin 169, AFP within normal limits, ASMA 1:40. MR/MRCP without stricturing or hepatic lesions. No ascites on all imaging and no history of HE. No known varices/ no EGD record on record.   - Echo - EF 73%, unremarkable   - Hepatology consulted, recs apreciated  - S/P liver bx 7/7; prelim results with evidence of hepatic fibrosis but no cirrhosis- f/u final results  - Follow up in Hepatology Fellow clinic.  - MRCP to be done in-house    # IDDM2, A1C 8.9 (6/2021)   - At home on: Lantus 14U QHS and sliding scale  - In house on: Lantus 15U QHS and Lispro 8U + Moderate ISS in house while on steroids.   - Will trend FS: adjustments will be made after evaluating dinner time FS if warranted     # Ethics/ DISPO/ GOC  - FULL CODE and GOC discussed with Son, Michela ([phone]) who expresses wishes of DNI, however will discuss further for DNR. Family wishes for all medical treatment (including invasive and possible lung transplant) to be performed, however if her condition was to worsen they do not wish for intubation. GOC conversation ongoing.   - PT recommends home with home PT   - Awaiting approval for home NIV machine  - DC planning likely early next week pending approval for NIV machine and completion of IV abx. Will plan for J&J COVID19 vaccine on discharge date.    # DVT PPX -Lovenox

## 2021-07-10 NOTE — PROGRESS NOTE ADULT - SUBJECTIVE AND OBJECTIVE BOX
CHIEF COMPLAINT: Patient is a 66y old  Female who presents with a chief complaint of SOB (09 Jul 2021 07:46)      Interval Events: Pt seen and evaluated at bedside     REVIEW OF SYSTEMS:  Constitutional:   Eyes:  ENT:  CV:  Resp:  GI:  :  MSK:  Integumentary:  Neurological:  Psychiatric:  Endocrine:  Hematologic/Lymphatic:  Allergic/Immunologic:  [ ] All other systems negative    OBJECTIVE:  ICU Vital Signs Last 24 Hrs  T(C): 36.9 (10 Jul 2021 05:00), Max: 36.9 (10 Jul 2021 05:00)  T(F): 98.5 (10 Jul 2021 05:00), Max: 98.5 (10 Jul 2021 05:00)  HR: 82 (10 Jul 2021 06:33) (80 - 100)  BP: 131/76 (10 Jul 2021 05:00) (122/71 - 143/75)  BP(mean): --  ABP: --  ABP(mean): --  RR: 20 (10 Jul 2021 05:00) (20 - 20)  SpO2: 97% (10 Jul 2021 06:33) (96% - 99%)    Mode: standby    07-08 @ 07:01  -  07-09 @ 07:00  --------------------------------------------------------  IN: 1260 mL / OUT: 0 mL / NET: 1260 mL      CAPILLARY BLOOD GLUCOSE      POCT Blood Glucose.: 109 mg/dL (09 Jul 2021 20:58)        HOSPITAL MEDICATIONS:  MEDICATIONS  (STANDING):  albuterol/ipratropium for Nebulization 3 milliLiter(s) Nebulizer every 6 hours  amLODIPine   Tablet 5 milliGRAM(s) Oral daily  ascorbic acid 500 milliGRAM(s) Oral daily  budesonide 160 MICROgram(s)/formoterol 4.5 MICROgram(s) Inhaler 2 Puff(s) Inhalation two times a day  cefepime   IVPB 2000 milliGRAM(s) IV Intermittent every 12 hours  chlorhexidine 4% Liquid 1 Application(s) Topical daily  dextrose 40% Gel 15 Gram(s) Oral once  dextrose 5%. 1000 milliLiter(s) (50 mL/Hr) IV Continuous <Continuous>  dextrose 5%. 1000 milliLiter(s) (100 mL/Hr) IV Continuous <Continuous>  dextrose 50% Injectable 25 Gram(s) IV Push once  dextrose 50% Injectable 12.5 Gram(s) IV Push once  dextrose 50% Injectable 25 Gram(s) IV Push once  enoxaparin Injectable 40 milliGRAM(s) SubCutaneous daily  famotidine    Tablet 40 milliGRAM(s) Oral at bedtime  ferrous    sulfate 325 milliGRAM(s) Oral daily  fluticasone propionate 50 MICROgram(s)/spray Nasal Spray 2 Spray(s) Both Nostrils two times a day  glucagon  Injectable 1 milliGRAM(s) IntraMuscular once  insulin glargine Injectable (LANTUS) 15 Unit(s) SubCutaneous at bedtime  insulin lispro (ADMELOG) corrective regimen sliding scale   SubCutaneous three times a day before meals  insulin lispro (ADMELOG) corrective regimen sliding scale   SubCutaneous at bedtime  insulin lispro Injectable (ADMELOG) 8 Unit(s) SubCutaneous three times a day before meals  mirtazapine 7.5 milliGRAM(s) Oral at bedtime  montelukast 10 milliGRAM(s) Oral daily  pantoprazole    Tablet 40 milliGRAM(s) Oral before breakfast  senna 2 Tablet(s) Oral at bedtime  sodium chloride 0.65% Nasal 2 Spray(s) Both Nostrils every 12 hours  sodium chloride 3%  Inhalation 3 milliLiter(s) Inhalation every 6 hours    MEDICATIONS  (PRN):  acetaminophen   Tablet .. 650 milliGRAM(s) Oral every 4 hours PRN Mild Pain (1 - 3)  guaiFENesin Oral Liquid (Sugar-Free) 100 milliGRAM(s) Oral every 6 hours PRN Cough      LABS:                        11.2   7.26  )-----------( 148      ( 08 Jul 2021 06:58 )             37.3     07-08    137  |  98  |  11  ----------------------------<  77  4.4   |  28  |  0.33<L>    Ca    9.4      08 Jul 2021 06:58  Phos  3.3     07-08  Mg     2.00     07-08    TPro  6.3  /  Alb  2.9<L>  /  TBili  0.2  /  DBili  x   /  AST  46<H>  /  ALT  38<H>  /  AlkPhos  141<H>  07-08              MICROBIOLOGY:     RADIOLOGY:  [ ] Reviewed and interpreted by me    PULMONARY FUNCTION TESTS:    EKG: CHIEF COMPLAINT: Patient is a 66y old  Female who presents with a chief complaint of SOB (09 Jul 2021 07:46)      Interval Events: Pt seen and evaluated at bedside     REVIEW OF SYSTEMS:  Constitutional: No new sx   CV: Denies   Resp: Denies   GI: Denies   [x ] All other systems negative    OBJECTIVE:  ICU Vital Signs Last 24 Hrs  T(C): 36.9 (10 Jul 2021 05:00), Max: 36.9 (10 Jul 2021 05:00)  T(F): 98.5 (10 Jul 2021 05:00), Max: 98.5 (10 Jul 2021 05:00)  HR: 82 (10 Jul 2021 06:33) (80 - 100)  BP: 131/76 (10 Jul 2021 05:00) (122/71 - 143/75)  BP(mean): --  ABP: --  ABP(mean): --  RR: 20 (10 Jul 2021 05:00) (20 - 20)  SpO2: 97% (10 Jul 2021 06:33) (96% - 99%)    Mode: standby    07-08 @ 07:01  -  07-09 @ 07:00  --------------------------------------------------------  IN: 1260 mL / OUT: 0 mL / NET: 1260 mL      CAPILLARY BLOOD GLUCOSE      POCT Blood Glucose.: 109 mg/dL (09 Jul 2021 20:58)        HOSPITAL MEDICATIONS:  MEDICATIONS  (STANDING):  albuterol/ipratropium for Nebulization 3 milliLiter(s) Nebulizer every 6 hours  amLODIPine   Tablet 5 milliGRAM(s) Oral daily  ascorbic acid 500 milliGRAM(s) Oral daily  budesonide 160 MICROgram(s)/formoterol 4.5 MICROgram(s) Inhaler 2 Puff(s) Inhalation two times a day  cefepime   IVPB 2000 milliGRAM(s) IV Intermittent every 12 hours  chlorhexidine 4% Liquid 1 Application(s) Topical daily  dextrose 40% Gel 15 Gram(s) Oral once  dextrose 5%. 1000 milliLiter(s) (50 mL/Hr) IV Continuous <Continuous>  dextrose 5%. 1000 milliLiter(s) (100 mL/Hr) IV Continuous <Continuous>  dextrose 50% Injectable 25 Gram(s) IV Push once  dextrose 50% Injectable 12.5 Gram(s) IV Push once  dextrose 50% Injectable 25 Gram(s) IV Push once  enoxaparin Injectable 40 milliGRAM(s) SubCutaneous daily  famotidine    Tablet 40 milliGRAM(s) Oral at bedtime  ferrous    sulfate 325 milliGRAM(s) Oral daily  fluticasone propionate 50 MICROgram(s)/spray Nasal Spray 2 Spray(s) Both Nostrils two times a day  glucagon  Injectable 1 milliGRAM(s) IntraMuscular once  insulin glargine Injectable (LANTUS) 15 Unit(s) SubCutaneous at bedtime  insulin lispro (ADMELOG) corrective regimen sliding scale   SubCutaneous three times a day before meals  insulin lispro (ADMELOG) corrective regimen sliding scale   SubCutaneous at bedtime  insulin lispro Injectable (ADMELOG) 8 Unit(s) SubCutaneous three times a day before meals  mirtazapine 7.5 milliGRAM(s) Oral at bedtime  montelukast 10 milliGRAM(s) Oral daily  pantoprazole    Tablet 40 milliGRAM(s) Oral before breakfast  senna 2 Tablet(s) Oral at bedtime  sodium chloride 0.65% Nasal 2 Spray(s) Both Nostrils every 12 hours  sodium chloride 3%  Inhalation 3 milliLiter(s) Inhalation every 6 hours    MEDICATIONS  (PRN):  acetaminophen   Tablet .. 650 milliGRAM(s) Oral every 4 hours PRN Mild Pain (1 - 3)  guaiFENesin Oral Liquid (Sugar-Free) 100 milliGRAM(s) Oral every 6 hours PRN Cough      LABS:                        11.2   7.26  )-----------( 148      ( 08 Jul 2021 06:58 )             37.3     07-08    137  |  98  |  11  ----------------------------<  77  4.4   |  28  |  0.33<L>    Ca    9.4      08 Jul 2021 06:58  Phos  3.3     07-08  Mg     2.00     07-08    TPro  6.3  /  Alb  2.9<L>  /  TBili  0.2  /  DBili  x   /  AST  46<H>  /  ALT  38<H>  /  AlkPhos  141<H>  07-08              MICROBIOLOGY:     RADIOLOGY:  [ ] Reviewed and interpreted by me    PULMONARY FUNCTION TESTS:    EKG:

## 2021-07-10 NOTE — PROGRESS NOTE ADULT - ATTENDING COMMENTS
agree with above  stable respiratory status  cefepime through7/13  pulmonary toilet  mrcp  JJ vaccine on d/c

## 2021-07-11 LAB
ANION GAP SERPL CALC-SCNC: 6 MMOL/L — LOW (ref 7–14)
BUN SERPL-MCNC: 14 MG/DL — SIGNIFICANT CHANGE UP (ref 7–23)
CALCIUM SERPL-MCNC: 9.2 MG/DL — SIGNIFICANT CHANGE UP (ref 8.4–10.5)
CHLORIDE SERPL-SCNC: 102 MMOL/L — SIGNIFICANT CHANGE UP (ref 98–107)
CO2 SERPL-SCNC: 32 MMOL/L — HIGH (ref 22–31)
CREAT SERPL-MCNC: 0.4 MG/DL — LOW (ref 0.5–1.3)
GLUCOSE BLDC GLUCOMTR-MCNC: 131 MG/DL — HIGH (ref 70–99)
GLUCOSE BLDC GLUCOMTR-MCNC: 134 MG/DL — HIGH (ref 70–99)
GLUCOSE BLDC GLUCOMTR-MCNC: 243 MG/DL — HIGH (ref 70–99)
GLUCOSE BLDC GLUCOMTR-MCNC: 252 MG/DL — HIGH (ref 70–99)
GLUCOSE SERPL-MCNC: 104 MG/DL — HIGH (ref 70–99)
HCT VFR BLD CALC: 34.3 % — LOW (ref 34.5–45)
HGB BLD-MCNC: 10.3 G/DL — LOW (ref 11.5–15.5)
MAGNESIUM SERPL-MCNC: 2 MG/DL — SIGNIFICANT CHANGE UP (ref 1.6–2.6)
MCHC RBC-ENTMCNC: 27.2 PG — SIGNIFICANT CHANGE UP (ref 27–34)
MCHC RBC-ENTMCNC: 30 GM/DL — LOW (ref 32–36)
MCV RBC AUTO: 90.5 FL — SIGNIFICANT CHANGE UP (ref 80–100)
NRBC # BLD: 0 /100 WBCS — SIGNIFICANT CHANGE UP
NRBC # FLD: 0 K/UL — SIGNIFICANT CHANGE UP
PHOSPHATE SERPL-MCNC: 3.1 MG/DL — SIGNIFICANT CHANGE UP (ref 2.5–4.5)
PLATELET # BLD AUTO: 135 K/UL — LOW (ref 150–400)
POTASSIUM SERPL-MCNC: 4.3 MMOL/L — SIGNIFICANT CHANGE UP (ref 3.5–5.3)
POTASSIUM SERPL-SCNC: 4.3 MMOL/L — SIGNIFICANT CHANGE UP (ref 3.5–5.3)
RBC # BLD: 3.79 M/UL — LOW (ref 3.8–5.2)
RBC # FLD: 13.7 % — SIGNIFICANT CHANGE UP (ref 10.3–14.5)
SODIUM SERPL-SCNC: 140 MMOL/L — SIGNIFICANT CHANGE UP (ref 135–145)
SURGICAL PATHOLOGY STUDY: SIGNIFICANT CHANGE UP
WBC # BLD: 10 K/UL — SIGNIFICANT CHANGE UP (ref 3.8–10.5)
WBC # FLD AUTO: 10 K/UL — SIGNIFICANT CHANGE UP (ref 3.8–10.5)

## 2021-07-11 PROCEDURE — 99233 SBSQ HOSP IP/OBS HIGH 50: CPT | Mod: GC

## 2021-07-11 RX ORDER — ALPRAZOLAM 0.25 MG
0.12 TABLET ORAL ONCE
Refills: 0 | Status: DISCONTINUED | OUTPATIENT
Start: 2021-07-11 | End: 2021-07-11

## 2021-07-11 RX ORDER — SIMETHICONE 80 MG/1
80 TABLET, CHEWABLE ORAL THREE TIMES A DAY
Refills: 0 | Status: DISCONTINUED | OUTPATIENT
Start: 2021-07-11 | End: 2021-07-19

## 2021-07-11 RX ADMIN — Medication 325 MILLIGRAM(S): at 12:02

## 2021-07-11 RX ADMIN — Medication 100 MILLIGRAM(S): at 21:33

## 2021-07-11 RX ADMIN — INSULIN GLARGINE 15 UNIT(S): 100 INJECTION, SOLUTION SUBCUTANEOUS at 21:29

## 2021-07-11 RX ADMIN — Medication 3 MILLILITER(S): at 21:59

## 2021-07-11 RX ADMIN — SODIUM CHLORIDE 3 MILLILITER(S): 9 INJECTION INTRAMUSCULAR; INTRAVENOUS; SUBCUTANEOUS at 21:58

## 2021-07-11 RX ADMIN — SIMETHICONE 80 MILLIGRAM(S): 80 TABLET, CHEWABLE ORAL at 08:46

## 2021-07-11 RX ADMIN — Medication 2 SPRAY(S): at 17:54

## 2021-07-11 RX ADMIN — ENOXAPARIN SODIUM 40 MILLIGRAM(S): 100 INJECTION SUBCUTANEOUS at 12:02

## 2021-07-11 RX ADMIN — Medication 3 MILLILITER(S): at 10:09

## 2021-07-11 RX ADMIN — Medication 8 UNIT(S): at 08:18

## 2021-07-11 RX ADMIN — AMLODIPINE BESYLATE 5 MILLIGRAM(S): 2.5 TABLET ORAL at 06:22

## 2021-07-11 RX ADMIN — Medication 500 MILLIGRAM(S): at 12:02

## 2021-07-11 RX ADMIN — PANTOPRAZOLE SODIUM 40 MILLIGRAM(S): 20 TABLET, DELAYED RELEASE ORAL at 06:22

## 2021-07-11 RX ADMIN — Medication 3 MILLILITER(S): at 16:07

## 2021-07-11 RX ADMIN — MIRTAZAPINE 7.5 MILLIGRAM(S): 45 TABLET, ORALLY DISINTEGRATING ORAL at 21:29

## 2021-07-11 RX ADMIN — SODIUM CHLORIDE 3 MILLILITER(S): 9 INJECTION INTRAMUSCULAR; INTRAVENOUS; SUBCUTANEOUS at 03:51

## 2021-07-11 RX ADMIN — SODIUM CHLORIDE 3 MILLILITER(S): 9 INJECTION INTRAMUSCULAR; INTRAVENOUS; SUBCUTANEOUS at 10:09

## 2021-07-11 RX ADMIN — Medication 100 MILLIGRAM(S): at 06:21

## 2021-07-11 RX ADMIN — Medication 0.12 MILLIGRAM(S): at 19:53

## 2021-07-11 RX ADMIN — Medication 4: at 17:54

## 2021-07-11 RX ADMIN — CHLORHEXIDINE GLUCONATE 1 APPLICATION(S): 213 SOLUTION TOPICAL at 12:02

## 2021-07-11 RX ADMIN — Medication 8 UNIT(S): at 17:55

## 2021-07-11 RX ADMIN — BUDESONIDE AND FORMOTEROL FUMARATE DIHYDRATE 2 PUFF(S): 160; 4.5 AEROSOL RESPIRATORY (INHALATION) at 10:09

## 2021-07-11 RX ADMIN — CEFEPIME 100 MILLIGRAM(S): 1 INJECTION, POWDER, FOR SOLUTION INTRAMUSCULAR; INTRAVENOUS at 17:54

## 2021-07-11 RX ADMIN — MONTELUKAST 10 MILLIGRAM(S): 4 TABLET, CHEWABLE ORAL at 12:02

## 2021-07-11 RX ADMIN — SIMETHICONE 80 MILLIGRAM(S): 80 TABLET, CHEWABLE ORAL at 14:09

## 2021-07-11 RX ADMIN — Medication 2 SPRAY(S): at 06:21

## 2021-07-11 RX ADMIN — Medication 100 MILLIGRAM(S): at 17:54

## 2021-07-11 RX ADMIN — BUDESONIDE AND FORMOTEROL FUMARATE DIHYDRATE 2 PUFF(S): 160; 4.5 AEROSOL RESPIRATORY (INHALATION) at 21:58

## 2021-07-11 RX ADMIN — FAMOTIDINE 40 MILLIGRAM(S): 10 INJECTION INTRAVENOUS at 21:29

## 2021-07-11 RX ADMIN — Medication 2 SPRAY(S): at 06:23

## 2021-07-11 RX ADMIN — Medication 3 MILLILITER(S): at 03:50

## 2021-07-11 RX ADMIN — SENNA PLUS 2 TABLET(S): 8.6 TABLET ORAL at 21:28

## 2021-07-11 RX ADMIN — Medication 6: at 12:03

## 2021-07-11 RX ADMIN — CEFEPIME 100 MILLIGRAM(S): 1 INJECTION, POWDER, FOR SOLUTION INTRAMUSCULAR; INTRAVENOUS at 06:18

## 2021-07-11 RX ADMIN — Medication 8 UNIT(S): at 12:03

## 2021-07-11 RX ADMIN — SODIUM CHLORIDE 3 MILLILITER(S): 9 INJECTION INTRAMUSCULAR; INTRAVENOUS; SUBCUTANEOUS at 16:07

## 2021-07-11 NOTE — PROVIDER CONTACT NOTE (OTHER) - REASON
Pt. complains of constipation request medication
Temp 101.3 tachycardic 122
pt feeling anxious, would like ant anxiety medication

## 2021-07-11 NOTE — PROVIDER CONTACT NOTE (OTHER) - ASSESSMENT
A/O x4, Leandro speaking, family at bedside. Vitals stable
Pt. complains of constipation request medication
Temp 101.3 tachycardic 122. Pt c/o of chills. /78 RR 20 93% on nasal canula
all vitals stable, pt feels anxious and would like something to help her relax

## 2021-07-11 NOTE — PROGRESS NOTE ADULT - ASSESSMENT
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 Patient now presented from Pulmonary Office, Dr. Young for worsening SOB, lethargy and cough with thick yellow secretions. Now readmitted for acute on chronic hypercarbic respiratory failure likely in setting of bronchiectasis exacerbation with recurrent PNA.     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   - CT CHEST 6/30 with (+) bronchiectasis   - SCx 6/30 with Pseudomonas Aeurginosa  - Continue on aggressive chest PT with Mucomyst, Duoneb and Chest Vest Q6H   - Continue on Symbicort BID and Singular QD   - s/p Prednisone 40mg QD (6/30 - 7/3)   - s/p Avycaz/ NELLY (6/30 - 7/2) and c/w Cefepime (7/2- until 7/14); Can dc on levaquin  - Hypercarbia improved and now continued on NC QD and AVAPs QHS.   - Will need home NIV set up prior to DC   - Has outpatient f/u appt with Dr. Young on 7/15 per Chart Review for possible Lung transplant  - Fever to 101 wbc increased to 18, CX sent , Scx + moderate gm+ cocci in pairs - Vanco added / afebrile overnight     # Acute on Chronic Sinusitis   - CT SINUS 6/30 with extensive inflammatory changes throughout the paranasal sinuses and their respective drainage pathways, , improved in the right frontal and sphenoid sinuses since comparison. 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 Cannot rule out CSF leak. Thinning of the left carotid canal wall along the posterior left sphenoid sinus. Cannot rule out dehiscence.  - Continue on Flonase and [place] BID   - MRI Sinuses- No definite evidence of CSF leak.  - ENT following    # Nodular Liver with possible Cirrhosis ?   - During admission in April patient was found to have nodular contour of liver on US 4/6/2021. Upon further discussion with patient and Hepatology, she was noted to have history of blood transfusion in 1998 with (+) Hep C AB with negative Hep C RNA. She also reports history of CBD stricture in 1998 in [place], s/p PTC/PTBD.   - CLD work up previously noted with HBsAg negative, HBcIgM negative, Hep E IgG negative, Hep A IgM negative, RUPESH, Anti-LKM and AMA negative, Ferritin 169, AFP within normal limits, ASMA 1:40. MR/MRCP without stricturing or hepatic lesions. No ascites on all imaging and no history of HE. No known varices/ no EGD record on record.   - Echo - EF 73%, unremarkable   - Hepatology consulted, recs apreciated  - S/P liver bx 7/7; prelim results with evidence of hepatic fibrosis but no cirrhosis- f/u final results  - Follow up in Hepatology Fellow clinic.  - MRCP to be done in-house    # IDDM2, A1C 8.9 (6/2021)   - At home on: Lantus 14U QHS and sliding scale  - In house on: Lantus 15U QHS and Lispro 8U + Moderate ISS in house while on steroids.   - Will trend FS: adjustments will be made after evaluating dinner time FS if warranted     # Ethics/ DISPO/ GOC  - FULL CODE and GOC discussed with SonMichela ([phone]) who expresses wishes of DNI, however will discuss further for DNR. Family wishes for all medical treatment (including invasive and possible lung transplant) to be performed, however if her condition was to worsen they do not wish for intubation. GOC conversation ongoing.   - PT recommends home with home PT   - Awaiting approval for home NIV machine  - DC planning likely early next week pending approval for NIV machine and completion of IV abx. Will plan for J&J COVID19 vaccine on discharge date.    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 Patient now presented from Pulmonary Office, Dr. Young for worsening SOB, lethargy and cough with thick yellow secretions. Now readmitted for acute on chronic hypercarbic respiratory failure likely in setting of bronchiectasis exacerbation with recurrent PNA.     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   - CT CHEST 6/30 with (+) bronchiectasis   - SCx 6/30 with Pseudomonas Aeurginosa  - Continue on aggressive chest PT with Mucomyst, Duoneb and Chest Vest Q6H   - Continue on Symbicort BID and Singular QD   - s/p Prednisone 40mg QD (6/30 - 7/3)   - s/p Avycaz/ NELLY (6/30 - 7/2) and c/w Cefepime (7/2- until 7/14); Can dc on levaquin  - Hypercarbia improved and now continued on NC QD and AVAPs QHS.   - Will need home NIV set up prior to DC   - Has outpatient f/u appt with Dr. Young on 7/15 per Chart Review for possible Lung transplant  - Fever to 101 wbc increased to 18, CX sent , Scx + moderate gm+ cocci in pairs - Vanco added / afebrile overnight   - WBC trend down this am -await results of cx's    # Acute on Chronic Sinusitis   - CT SINUS 6/30 with extensive inflammatory changes throughout the paranasal sinuses and their respective drainage pathways, , improved in the right frontal and sphenoid sinuses since comparison. 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 Cannot rule out CSF leak. Thinning of the left carotid canal wall along the posterior left sphenoid sinus. Cannot rule out dehiscence.  - Continue on Flonase and [place] BID   - MRI Sinuses- No definite evidence of CSF leak.  - ENT following    # Nodular Liver with possible Cirrhosis ?   - During admission in April patient was found to have nodular contour of liver on US 4/6/2021. Upon further discussion with patient and Hepatology, she was noted to have history of blood transfusion in 1998 with (+) Hep C AB with negative Hep C RNA. She also reports history of CBD stricture in 1998 in [place], s/p PTC/PTBD.   - CLD work up previously noted with HBsAg negative, HBcIgM negative, Hep E IgG negative, Hep A IgM negative, RUPESH, Anti-LKM and AMA negative, Ferritin 169, AFP within normal limits, ASMA 1:40. MR/MRCP without stricturing or hepatic lesions. No ascites on all imaging and no history of HE. No known varices/ no EGD record on record.   - Echo - EF 73%, unremarkable   - Hepatology consulted, recs apreciated  - S/P liver bx 7/7; prelim results with evidence of hepatic fibrosis but no cirrhosis- f/u final results  - Follow up in Hepatology Fellow clinic.  - MRCP to be done in-house    # IDDM2, A1C 8.9 (6/2021)   - At home on: Lantus 14U QHS and sliding scale  - In house on: Lantus 15U QHS and Lispro 8U + Moderate ISS in house while on steroids.   - Will trend FS: adjustments will be made after evaluating dinner time FS if warranted     # Ethics/ DISPO/ GOC  - FULL CODE and GOC discussed with Son, Michela ([phone]) who expresses wishes of DNI, however will discuss further for DNR. Family wishes for all medical treatment (including invasive and possible lung transplant) to be performed, however if her condition was to worsen they do not wish for intubation. GOC conversation ongoing.   - PT recommends home with home PT   - Awaiting approval for home NIV machine  - DC planning likely early next week pending approval for NIV machine and completion of IV abx. Will plan for J&J COVID19 vaccine on discharge date.    # DVT PPX -Lovenox

## 2021-07-11 NOTE — PROGRESS NOTE ADULT - ATTENDING COMMENTS
agree with NP above  fever x 1  and leukocytosis yesterday  vanco added for mrsa coverage  today, afebrile and wbc improved  f/u cultures  pending MRCP

## 2021-07-11 NOTE — PROVIDER CONTACT NOTE (OTHER) - SITUATION
Pt. complains of constipation request medication
pt feeling anxious, would like ant anxiety medication
FS 44
Temp 101.3 tachycardic 122. Pt c/o of chills. /78 RR 20 93% on nasal canula

## 2021-07-11 NOTE — PROVIDER CONTACT NOTE (OTHER) - BACKGROUND
67 yo F admitted for COPD with lower respiratory infection
pt admitted for COPD with lower respiratory infection
Bronchiectasis
pt admitted for COPD with lower respiratory infection

## 2021-07-11 NOTE — PROGRESS NOTE ADULT - SUBJECTIVE AND OBJECTIVE BOX
CHIEF COMPLAINT:    Interval Events:    REVIEW OF SYSTEMS:  Constitutional:   Eyes:  ENT:  CV:  Resp:  GI:  :  MSK:  Integumentary:  Neurological:  Psychiatric:  Endocrine:  Hematologic/Lymphatic:  Allergic/Immunologic:  [ ] All other systems negative  [ ] Unable to assess ROS because ________    OBJECTIVE:  ICU Vital Signs Last 24 Hrs  T(C): 36.4 (11 Jul 2021 06:14), Max: 38.5 (10 Jul 2021 11:43)  T(F): 97.5 (11 Jul 2021 06:14), Max: 101.3 (10 Jul 2021 11:43)  HR: 89 (11 Jul 2021 06:14) (80 - 122)  BP: 127/68 (11 Jul 2021 06:14) (127/68 - 144/78)  BP(mean): --  ABP: --  ABP(mean): --  RR: 17 (11 Jul 2021 06:14) (17 - 20)  SpO2: 100% (11 Jul 2021 06:14) (93% - 100%)    Mode: AVAPS, RR (machine): 18, TV (machine): 380, FiO2: 28, PEEP: 5, ITime: 1, PIP: 17    CAPILLARY BLOOD GLUCOSE      POCT Blood Glucose.: 159 mg/dL (10 Jul 2021 21:51)      PHYSICAL EXAM:  General:   HEENT:   Lymph Nodes:  Neck:   Respiratory:   Cardiovascular:   Abdomen:   Extremities:   Skin:   Neurological:  Psychiatry:    HOSPITAL MEDICATIONS:  MEDICATIONS  (STANDING):  albuterol/ipratropium for Nebulization 3 milliLiter(s) Nebulizer every 6 hours  amLODIPine   Tablet 5 milliGRAM(s) Oral daily  ascorbic acid 500 milliGRAM(s) Oral daily  budesonide 160 MICROgram(s)/formoterol 4.5 MICROgram(s) Inhaler 2 Puff(s) Inhalation two times a day  cefepime   IVPB 2000 milliGRAM(s) IV Intermittent every 12 hours  chlorhexidine 4% Liquid 1 Application(s) Topical daily  dextrose 40% Gel 15 Gram(s) Oral once  dextrose 5%. 1000 milliLiter(s) (50 mL/Hr) IV Continuous <Continuous>  dextrose 5%. 1000 milliLiter(s) (100 mL/Hr) IV Continuous <Continuous>  dextrose 50% Injectable 25 Gram(s) IV Push once  dextrose 50% Injectable 12.5 Gram(s) IV Push once  dextrose 50% Injectable 25 Gram(s) IV Push once  enoxaparin Injectable 40 milliGRAM(s) SubCutaneous daily  famotidine    Tablet 40 milliGRAM(s) Oral at bedtime  ferrous    sulfate 325 milliGRAM(s) Oral daily  fluticasone propionate 50 MICROgram(s)/spray Nasal Spray 2 Spray(s) Both Nostrils two times a day  glucagon  Injectable 1 milliGRAM(s) IntraMuscular once  insulin glargine Injectable (LANTUS) 15 Unit(s) SubCutaneous at bedtime  insulin lispro (ADMELOG) corrective regimen sliding scale   SubCutaneous three times a day before meals  insulin lispro (ADMELOG) corrective regimen sliding scale   SubCutaneous at bedtime  insulin lispro Injectable (ADMELOG) 8 Unit(s) SubCutaneous three times a day before meals  mirtazapine 7.5 milliGRAM(s) Oral at bedtime  montelukast 10 milliGRAM(s) Oral daily  pantoprazole    Tablet 40 milliGRAM(s) Oral before breakfast  senna 2 Tablet(s) Oral at bedtime  sodium chloride 0.65% Nasal 2 Spray(s) Both Nostrils every 12 hours  sodium chloride 3%  Inhalation 3 milliLiter(s) Inhalation every 6 hours  vancomycin  IVPB      vancomycin  IVPB 500 milliGRAM(s) IV Intermittent every 12 hours    MEDICATIONS  (PRN):  acetaminophen   Tablet .. 650 milliGRAM(s) Oral every 6 hours PRN Temp greater or equal to 38C (100.4F)  acetaminophen   Tablet .. 650 milliGRAM(s) Oral every 4 hours PRN Mild Pain (1 - 3)  guaiFENesin Oral Liquid (Sugar-Free) 100 milliGRAM(s) Oral every 6 hours PRN Cough      LABS:                        11.8   18.03 )-----------( 157      ( 10 Jul 2021 12:46 )             38.4     07-10    138  |  96<L>  |  15  ----------------------------<  132<H>  3.9   |  32<H>  |  0.40<L>    Ca    9.7      10 Jul 2021 12:46  Phos  1.7     07-10  Mg     1.80     07-10                MICROBIOLOGY:     RADIOLOGY:  [ ] Reviewed and interpreted by me    PULMONARY FUNCTION TESTS:    EKG: CHIEF COMPLAINT: Patient is a 66y old  Female who presents with a chief complaint of SOB (11 Jul 2021 06:40)      Interval Events: Pt seen oob in chair .  Awaiting MRCP Had temp 101.3 yesterday Cx sent     REVIEW OF SYSTEMS:  Constitutional: No pain   CV: Denies   Resp: Denies   MSK: weak   [x ] All other systems negative      OBJECTIVE:  ICU Vital Signs Last 24 Hrs  T(C): 36.4 (11 Jul 2021 06:14), Max: 38.5 (10 Jul 2021 11:43)  T(F): 97.5 (11 Jul 2021 06:14), Max: 101.3 (10 Jul 2021 11:43)  HR: 89 (11 Jul 2021 06:14) (80 - 122)  BP: 127/68 (11 Jul 2021 06:14) (127/68 - 144/78)  BP(mean): --  ABP: --  ABP(mean): --  RR: 17 (11 Jul 2021 06:14) (17 - 20)  SpO2: 100% (11 Jul 2021 06:14) (93% - 100%)    Mode: AVAPS, RR (machine): 18, TV (machine): 380, FiO2: 28, PEEP: 5, ITime: 1, PIP: 17    CAPILLARY BLOOD GLUCOSE      POCT Blood Glucose.: 159 mg/dL (10 Jul 2021 21:51)        HOSPITAL MEDICATIONS:  MEDICATIONS  (STANDING):  albuterol/ipratropium for Nebulization 3 milliLiter(s) Nebulizer every 6 hours  amLODIPine   Tablet 5 milliGRAM(s) Oral daily  ascorbic acid 500 milliGRAM(s) Oral daily  budesonide 160 MICROgram(s)/formoterol 4.5 MICROgram(s) Inhaler 2 Puff(s) Inhalation two times a day  cefepime   IVPB 2000 milliGRAM(s) IV Intermittent every 12 hours  chlorhexidine 4% Liquid 1 Application(s) Topical daily  dextrose 40% Gel 15 Gram(s) Oral once  dextrose 5%. 1000 milliLiter(s) (50 mL/Hr) IV Continuous <Continuous>  dextrose 5%. 1000 milliLiter(s) (100 mL/Hr) IV Continuous <Continuous>  dextrose 50% Injectable 25 Gram(s) IV Push once  dextrose 50% Injectable 12.5 Gram(s) IV Push once  dextrose 50% Injectable 25 Gram(s) IV Push once  enoxaparin Injectable 40 milliGRAM(s) SubCutaneous daily  famotidine    Tablet 40 milliGRAM(s) Oral at bedtime  ferrous    sulfate 325 milliGRAM(s) Oral daily  fluticasone propionate 50 MICROgram(s)/spray Nasal Spray 2 Spray(s) Both Nostrils two times a day  glucagon  Injectable 1 milliGRAM(s) IntraMuscular once  insulin glargine Injectable (LANTUS) 15 Unit(s) SubCutaneous at bedtime  insulin lispro (ADMELOG) corrective regimen sliding scale   SubCutaneous three times a day before meals  insulin lispro (ADMELOG) corrective regimen sliding scale   SubCutaneous at bedtime  insulin lispro Injectable (ADMELOG) 8 Unit(s) SubCutaneous three times a day before meals  mirtazapine 7.5 milliGRAM(s) Oral at bedtime  montelukast 10 milliGRAM(s) Oral daily  pantoprazole    Tablet 40 milliGRAM(s) Oral before breakfast  senna 2 Tablet(s) Oral at bedtime  sodium chloride 0.65% Nasal 2 Spray(s) Both Nostrils every 12 hours  sodium chloride 3%  Inhalation 3 milliLiter(s) Inhalation every 6 hours  vancomycin  IVPB      vancomycin  IVPB 500 milliGRAM(s) IV Intermittent every 12 hours    MEDICATIONS  (PRN):  acetaminophen   Tablet .. 650 milliGRAM(s) Oral every 6 hours PRN Temp greater or equal to 38C (100.4F)  acetaminophen   Tablet .. 650 milliGRAM(s) Oral every 4 hours PRN Mild Pain (1 - 3)  guaiFENesin Oral Liquid (Sugar-Free) 100 milliGRAM(s) Oral every 6 hours PRN Cough      LABS:                        11.8   18.03 )-----------( 157      ( 10 Jul 2021 12:46 )             38.4     07-10    138  |  96<L>  |  15  ----------------------------<  132<H>  3.9   |  32<H>  |  0.40<L>    Ca    9.7      10 Jul 2021 12:46  Phos  1.7     07-10  Mg     1.80     07-10                MICROBIOLOGY:     RADIOLOGY:  [ ] Reviewed and interpreted by me    PULMONARY FUNCTION TESTS:    EKG:

## 2021-07-11 NOTE — PROVIDER CONTACT NOTE (OTHER) - ACTION/TREATMENT ORDERED:
Provider notified. Ok to give tylenol, blood cultures ordered & sputum culture ordered.
give 12 u insulin before dinner
Provider Contact / Medication ordered.
Provider will review chart and order a medication for pt

## 2021-07-12 LAB
-  AMIKACIN: SIGNIFICANT CHANGE UP
-  AZTREONAM: SIGNIFICANT CHANGE UP
-  CEFEPIME: SIGNIFICANT CHANGE UP
-  CEFTAZIDIME: SIGNIFICANT CHANGE UP
-  CIPROFLOXACIN: SIGNIFICANT CHANGE UP
-  GENTAMICIN: SIGNIFICANT CHANGE UP
-  IMIPENEM: SIGNIFICANT CHANGE UP
-  LEVOFLOXACIN: SIGNIFICANT CHANGE UP
-  MEROPENEM: SIGNIFICANT CHANGE UP
-  PIPERACILLIN/TAZOBACTAM: SIGNIFICANT CHANGE UP
-  TOBRAMYCIN: SIGNIFICANT CHANGE UP
ANION GAP SERPL CALC-SCNC: 8 MMOL/L — SIGNIFICANT CHANGE UP (ref 7–14)
BUN SERPL-MCNC: 12 MG/DL — SIGNIFICANT CHANGE UP (ref 7–23)
CALCIUM SERPL-MCNC: 9.4 MG/DL — SIGNIFICANT CHANGE UP (ref 8.4–10.5)
CHLORIDE SERPL-SCNC: 101 MMOL/L — SIGNIFICANT CHANGE UP (ref 98–107)
CO2 SERPL-SCNC: 34 MMOL/L — HIGH (ref 22–31)
CREAT SERPL-MCNC: 0.35 MG/DL — LOW (ref 0.5–1.3)
CULTURE RESULTS: SIGNIFICANT CHANGE UP
GLUCOSE BLDC GLUCOMTR-MCNC: 165 MG/DL — HIGH (ref 70–99)
GLUCOSE BLDC GLUCOMTR-MCNC: 211 MG/DL — HIGH (ref 70–99)
GLUCOSE BLDC GLUCOMTR-MCNC: 357 MG/DL — HIGH (ref 70–99)
GLUCOSE BLDC GLUCOMTR-MCNC: 84 MG/DL — SIGNIFICANT CHANGE UP (ref 70–99)
GLUCOSE BLDC GLUCOMTR-MCNC: 95 MG/DL — SIGNIFICANT CHANGE UP (ref 70–99)
GLUCOSE SERPL-MCNC: 104 MG/DL — HIGH (ref 70–99)
HCT VFR BLD CALC: 34.6 % — SIGNIFICANT CHANGE UP (ref 34.5–45)
HGB BLD-MCNC: 10.3 G/DL — LOW (ref 11.5–15.5)
MAGNESIUM SERPL-MCNC: 1.9 MG/DL — SIGNIFICANT CHANGE UP (ref 1.6–2.6)
MCHC RBC-ENTMCNC: 27 PG — SIGNIFICANT CHANGE UP (ref 27–34)
MCHC RBC-ENTMCNC: 29.8 GM/DL — LOW (ref 32–36)
MCV RBC AUTO: 90.8 FL — SIGNIFICANT CHANGE UP (ref 80–100)
METHOD TYPE: SIGNIFICANT CHANGE UP
NRBC # BLD: 0 /100 WBCS — SIGNIFICANT CHANGE UP
NRBC # FLD: 0 K/UL — SIGNIFICANT CHANGE UP
ORGANISM # SPEC MICROSCOPIC CNT: SIGNIFICANT CHANGE UP
ORGANISM # SPEC MICROSCOPIC CNT: SIGNIFICANT CHANGE UP
PHOSPHATE SERPL-MCNC: 3.2 MG/DL — SIGNIFICANT CHANGE UP (ref 2.5–4.5)
PLATELET # BLD AUTO: 148 K/UL — LOW (ref 150–400)
POTASSIUM SERPL-MCNC: 3.9 MMOL/L — SIGNIFICANT CHANGE UP (ref 3.5–5.3)
POTASSIUM SERPL-SCNC: 3.9 MMOL/L — SIGNIFICANT CHANGE UP (ref 3.5–5.3)
RBC # BLD: 3.81 M/UL — SIGNIFICANT CHANGE UP (ref 3.8–5.2)
RBC # FLD: 13.7 % — SIGNIFICANT CHANGE UP (ref 10.3–14.5)
SODIUM SERPL-SCNC: 143 MMOL/L — SIGNIFICANT CHANGE UP (ref 135–145)
SPECIMEN SOURCE: SIGNIFICANT CHANGE UP
VANCOMYCIN TROUGH SERPL-MCNC: 5.7 UG/ML — LOW (ref 10–20)
WBC # BLD: 7.48 K/UL — SIGNIFICANT CHANGE UP (ref 3.8–10.5)
WBC # FLD AUTO: 7.48 K/UL — SIGNIFICANT CHANGE UP (ref 3.8–10.5)

## 2021-07-12 PROCEDURE — 99232 SBSQ HOSP IP/OBS MODERATE 35: CPT | Mod: GC

## 2021-07-12 PROCEDURE — 99233 SBSQ HOSP IP/OBS HIGH 50: CPT | Mod: GC

## 2021-07-12 PROCEDURE — 74182 MRI ABDOMEN W/CONTRAST: CPT | Mod: 26

## 2021-07-12 RX ORDER — VANCOMYCIN HCL 1 G
1000 VIAL (EA) INTRAVENOUS EVERY 12 HOURS
Refills: 0 | Status: DISCONTINUED | OUTPATIENT
Start: 2021-07-12 | End: 2021-07-12

## 2021-07-12 RX ORDER — VANCOMYCIN HCL 1 G
VIAL (EA) INTRAVENOUS
Refills: 0 | Status: DISCONTINUED | OUTPATIENT
Start: 2021-07-12 | End: 2021-07-12

## 2021-07-12 RX ORDER — AER TRAVELER 0.5 G/1
1 SOLUTION RECTAL; TOPICAL THREE TIMES A DAY
Refills: 0 | Status: DISCONTINUED | OUTPATIENT
Start: 2021-07-12 | End: 2021-07-19

## 2021-07-12 RX ORDER — VANCOMYCIN HCL 1 G
1000 VIAL (EA) INTRAVENOUS ONCE
Refills: 0 | Status: COMPLETED | OUTPATIENT
Start: 2021-07-12 | End: 2021-07-12

## 2021-07-12 RX ORDER — ALPRAZOLAM 0.25 MG
0.25 TABLET ORAL ONCE
Refills: 0 | Status: DISCONTINUED | OUTPATIENT
Start: 2021-07-12 | End: 2021-07-12

## 2021-07-12 RX ADMIN — Medication 3 MILLILITER(S): at 10:02

## 2021-07-12 RX ADMIN — SENNA PLUS 2 TABLET(S): 8.6 TABLET ORAL at 21:22

## 2021-07-12 RX ADMIN — Medication 8 UNIT(S): at 17:10

## 2021-07-12 RX ADMIN — Medication 4: at 12:01

## 2021-07-12 RX ADMIN — Medication 2 SPRAY(S): at 05:54

## 2021-07-12 RX ADMIN — Medication 2 SPRAY(S): at 17:08

## 2021-07-12 RX ADMIN — SODIUM CHLORIDE 3 MILLILITER(S): 9 INJECTION INTRAMUSCULAR; INTRAVENOUS; SUBCUTANEOUS at 10:03

## 2021-07-12 RX ADMIN — AMLODIPINE BESYLATE 5 MILLIGRAM(S): 2.5 TABLET ORAL at 05:53

## 2021-07-12 RX ADMIN — BUDESONIDE AND FORMOTEROL FUMARATE DIHYDRATE 2 PUFF(S): 160; 4.5 AEROSOL RESPIRATORY (INHALATION) at 10:04

## 2021-07-12 RX ADMIN — Medication 0.25 MILLIGRAM(S): at 21:21

## 2021-07-12 RX ADMIN — ENOXAPARIN SODIUM 40 MILLIGRAM(S): 100 INJECTION SUBCUTANEOUS at 12:00

## 2021-07-12 RX ADMIN — Medication 500 MILLIGRAM(S): at 11:59

## 2021-07-12 RX ADMIN — SODIUM CHLORIDE 3 MILLILITER(S): 9 INJECTION INTRAMUSCULAR; INTRAVENOUS; SUBCUTANEOUS at 22:45

## 2021-07-12 RX ADMIN — Medication 250 MILLIGRAM(S): at 05:52

## 2021-07-12 RX ADMIN — MONTELUKAST 10 MILLIGRAM(S): 4 TABLET, CHEWABLE ORAL at 11:59

## 2021-07-12 RX ADMIN — AER TRAVELER 1 APPLICATION(S): 0.5 SOLUTION RECTAL; TOPICAL at 21:22

## 2021-07-12 RX ADMIN — Medication 6: at 21:19

## 2021-07-12 RX ADMIN — CEFEPIME 100 MILLIGRAM(S): 1 INJECTION, POWDER, FOR SOLUTION INTRAMUSCULAR; INTRAVENOUS at 17:09

## 2021-07-12 RX ADMIN — Medication 3 MILLILITER(S): at 03:31

## 2021-07-12 RX ADMIN — SODIUM CHLORIDE 3 MILLILITER(S): 9 INJECTION INTRAMUSCULAR; INTRAVENOUS; SUBCUTANEOUS at 03:31

## 2021-07-12 RX ADMIN — MIRTAZAPINE 7.5 MILLIGRAM(S): 45 TABLET, ORALLY DISINTEGRATING ORAL at 21:21

## 2021-07-12 RX ADMIN — Medication 3 MILLILITER(S): at 16:24

## 2021-07-12 RX ADMIN — Medication 325 MILLIGRAM(S): at 11:59

## 2021-07-12 RX ADMIN — BUDESONIDE AND FORMOTEROL FUMARATE DIHYDRATE 2 PUFF(S): 160; 4.5 AEROSOL RESPIRATORY (INHALATION) at 22:45

## 2021-07-12 RX ADMIN — Medication 8 UNIT(S): at 08:40

## 2021-07-12 RX ADMIN — PANTOPRAZOLE SODIUM 40 MILLIGRAM(S): 20 TABLET, DELAYED RELEASE ORAL at 08:40

## 2021-07-12 RX ADMIN — Medication 2: at 17:09

## 2021-07-12 RX ADMIN — INSULIN GLARGINE 15 UNIT(S): 100 INJECTION, SOLUTION SUBCUTANEOUS at 21:21

## 2021-07-12 RX ADMIN — CEFEPIME 100 MILLIGRAM(S): 1 INJECTION, POWDER, FOR SOLUTION INTRAMUSCULAR; INTRAVENOUS at 05:52

## 2021-07-12 RX ADMIN — Medication 3 MILLILITER(S): at 22:45

## 2021-07-12 RX ADMIN — Medication 8 UNIT(S): at 12:00

## 2021-07-12 RX ADMIN — CHLORHEXIDINE GLUCONATE 1 APPLICATION(S): 213 SOLUTION TOPICAL at 12:00

## 2021-07-12 RX ADMIN — FAMOTIDINE 40 MILLIGRAM(S): 10 INJECTION INTRAVENOUS at 21:22

## 2021-07-12 RX ADMIN — SODIUM CHLORIDE 3 MILLILITER(S): 9 INJECTION INTRAMUSCULAR; INTRAVENOUS; SUBCUTANEOUS at 16:25

## 2021-07-12 NOTE — PROGRESS NOTE ADULT - ATTENDING COMMENTS
Patient with severe chronic lung disease being evaluated for lung transplantation. Ultrasound revealed nodular liver and now further evaluation performed to define severity of liver disease. Liver biopsy did not show cirrhosis but only limited portal fibrosis and portal pressures did not show portal hypertension. Suggest abdominal MRI to best define liver morphology, but there is not evidence of cirrhosis to preclude lung transplant.  Results discussed with patient and family

## 2021-07-12 NOTE — PROGRESS NOTE ADULT - SUBJECTIVE AND OBJECTIVE BOX
Chief Complaint:  Patient is a 66y old  Female who presents with a chief complaint of SOB (12 Jul 2021 06:46)      Interval Events: NAEON. Complaing of some wheezing this AM otherwise no GI complaints.      Hospital Medications:  acetaminophen   Tablet .. 650 milliGRAM(s) Oral every 4 hours PRN  acetaminophen   Tablet .. 650 milliGRAM(s) Oral every 6 hours PRN  albuterol/ipratropium for Nebulization 3 milliLiter(s) Nebulizer every 6 hours  ALPRAZolam 0.25 milliGRAM(s) Oral once PRN  amLODIPine   Tablet 5 milliGRAM(s) Oral daily  ascorbic acid 500 milliGRAM(s) Oral daily  budesonide 160 MICROgram(s)/formoterol 4.5 MICROgram(s) Inhaler 2 Puff(s) Inhalation two times a day  cefepime   IVPB 2000 milliGRAM(s) IV Intermittent every 12 hours  chlorhexidine 4% Liquid 1 Application(s) Topical daily  dextrose 40% Gel 15 Gram(s) Oral once  dextrose 5%. 1000 milliLiter(s) IV Continuous <Continuous>  dextrose 5%. 1000 milliLiter(s) IV Continuous <Continuous>  dextrose 50% Injectable 25 Gram(s) IV Push once  dextrose 50% Injectable 12.5 Gram(s) IV Push once  dextrose 50% Injectable 25 Gram(s) IV Push once  enoxaparin Injectable 40 milliGRAM(s) SubCutaneous daily  famotidine    Tablet 40 milliGRAM(s) Oral at bedtime  ferrous    sulfate 325 milliGRAM(s) Oral daily  fluticasone propionate 50 MICROgram(s)/spray Nasal Spray 2 Spray(s) Both Nostrils two times a day  glucagon  Injectable 1 milliGRAM(s) IntraMuscular once  guaiFENesin Oral Liquid (Sugar-Free) 100 milliGRAM(s) Oral every 6 hours PRN  insulin glargine Injectable (LANTUS) 15 Unit(s) SubCutaneous at bedtime  insulin lispro (ADMELOG) corrective regimen sliding scale   SubCutaneous three times a day before meals  insulin lispro (ADMELOG) corrective regimen sliding scale   SubCutaneous at bedtime  insulin lispro Injectable (ADMELOG) 8 Unit(s) SubCutaneous three times a day before meals  mirtazapine 7.5 milliGRAM(s) Oral at bedtime  montelukast 10 milliGRAM(s) Oral daily  pantoprazole    Tablet 40 milliGRAM(s) Oral before breakfast  senna 2 Tablet(s) Oral at bedtime  simethicone 80 milliGRAM(s) Chew three times a day PRN  sodium chloride 0.65% Nasal 2 Spray(s) Both Nostrils every 12 hours  sodium chloride 3%  Inhalation 3 milliLiter(s) Inhalation every 6 hours      PMHX/PSHX:  DM (diabetes mellitus)    Bronchitis    ABPA (allergic bronchopulmonary aspergillosis)    Bronchiectasis    Asthma    Hypertension    Vitamin D deficiency    Microcytic anemia    GERD (gastroesophageal reflux disease)    Postnasal drip    Pseudomonas aeruginosa colonization    Allergic rhinitis    Recurrent pneumonia    Type 2 diabetes mellitus, with long-term current use of insulin    History of cholecystectomy            ROS: 10 point ROS negative unless otherwise stated in HPI      PHYSICAL EXAM:     GENERAL:  Well developed, no distress  HEENT:  NC/AT,  conjunctivae clear, sclera anicteric  CHEST:  +diffusely rhonchorus breath sounds; +diffuse wheezing  HEART:  Regular rhythm & rate  ABDOMEN:  Soft, non-tender, non-distended; +BS  EXTREMITIES:  no LE  edema  SKIN:  No rash/erythema/ecchymoses/petechiae/wounds/jaundice  NEURO:  Alert, oriented    Vital Signs:  Vital Signs Last 24 Hrs  T(C): 36.6 (12 Jul 2021 14:37), Max: 36.7 (11 Jul 2021 21:00)  T(F): 97.9 (12 Jul 2021 14:37), Max: 98 (11 Jul 2021 21:00)  HR: 99 (12 Jul 2021 14:37) (84 - 99)  BP: 142/69 (12 Jul 2021 14:37) (121/74 - 142/69)  BP(mean): --  RR: 20 (12 Jul 2021 14:37) (20 - 20)  SpO2: 92% (12 Jul 2021 14:37) (91% - 98%)  Daily     Daily     LABS:                        10.3   7.48  )-----------( 148      ( 12 Jul 2021 04:12 )             34.6     07-12    143  |  101  |  12  ----------------------------<  104<H>  3.9   |  34<H>  |  0.35<L>    Ca    9.4      12 Jul 2021 04:12  Phos  3.2     07-12  Mg     1.90     07-12                Imaging:    Pending MRI abd/MRCP

## 2021-07-12 NOTE — PROGRESS NOTE ADULT - ASSESSMENT
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 Hepatology consulted to assist with workup/evaluation of possible underlying cirrhosis in the setting of pending lung transplant evaluation. history of blood transfusion in 1998. 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 CLD work up previously: HBsAg neg, HbcIgM neg, c Total neg, sAb neg, IgG, IgA, IgM wnl, RUPESH, anti-LKM, AMA negative, Hepatitis A neg, ferritin 169, AFP wnl, ASMA 1:40. Per MRCP from 04/2021, pt without stricturing or hepatic lesions. Workup for cirrhosis so far was notable for positive ASMA of 1:40, Child Maria Class A; MELD Na 9; FIB4- 1.96 which suggested unlikely advanced fibrosis, normal PLT, (She had positive anti_HCV but negative HCV PCR, no evidence of active or chronic HCV), A1AT was 163 (normal) this admission. Was never treated for chronic HCV. s/p transjugular liver bx by IR on 7/7 with 3 mmHg wedged - RA gradient; 2 core specimens were obtained. No evidence of physiologic or pathologic cirrhosis (RA wedge gradient during TJ bx was 3 mmHg and s/p liver bx 7/7 showing mild portal fibrosis; no cirrhosis). Awaiting MR abd + MRCP.      Impression:  #Mild Fibrosis- FIB4- 1.96  Etiology: unlikely hep C given neg RNA; history of blood transfusion in 1998. 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 CLD work up previously: HBsAg neg, HbcIgM neg, c Total neg, sAb neg, IgG, IgA, IgM wnl, RUPESH, anti-LKM, AMA negative, Hepatitis A neg, ferritin 169, AFP wnl, ASMA 1:40. MR/MRCP without stricturing or hepatic lesions. No ascites on all imaging and no hx of HE. A1AT was 163 (normal) this admission. No evidence of physiologic or pathologic cirrhosis (RA wedge gradient during TJ bx was 3 mmHg and s/p liver bx 7/7 showing mild portal fibrosis; no cirrhosis).     Recommendations:  - patient to follow up in liver clinic in 2 weeks (will message schedulers and provide pt with liver clinic number prior to discharge)  - pending MR abd + MRCP to evaluate bile ducts given h/o CBD stricturing  - rest of care per primary team      Kailey Lewis MD PGY-4  Gastroenterology Fellow  Pager #[phone] ([facility]) or [phone] (NS)  Available on Microsoft Teams.  Please contact on-call GI fellow via answering service ([phone]) after 5pm and before 8am, and on weekends.

## 2021-07-12 NOTE — PROGRESS NOTE ADULT - ASSESSMENT
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 Patient now presented from Pulmonary Office, Dr. Young for worsening SOB, lethargy and cough with thick yellow secretions. Now readmitted for acute on chronic hypercarbic respiratory failure likely in setting of bronchiectasis exacerbation with recurrent PNA.     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   - CT CHEST 6/30 with (+) bronchiectasis   - SCx 6/30 with Pseudomonas Aeurginosa  - Continue on aggressive chest PT with Mucomyst, Duoneb and Chest Vest Q6H   - Continue on Symbicort BID and Singular QD   - s/p Prednisone 40mg QD (6/30 - 7/3)   - s/p Avycaz/ NELLY (6/30 - 7/2) and c/w Cefepime (7/2- until 7/14); Can dc on levaquin  - Hypercarbia improved and now continued on NC QD and AVAPs QHS.   - Will need home NIV set up prior to DC   - Has outpatient f/u appt with Dr. Young on 7/15 per Chart Review for possible Lung transplant  - Fever to 101 wbc increased to 18, CX sent , Scx + moderate gm+ cocci in pairs - Vanco added / afebrile overnight   - WBC trend down this am -await results of cx's  - Vanco level sub therapeutic and dose adjusted fu repeat trough      # Acute on Chronic Sinusitis   - CT SINUS 6/30 with extensive inflammatory changes throughout the paranasal sinuses and their respective drainage pathways, , improved in the right frontal and sphenoid sinuses since comparison. 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 Cannot rule out CSF leak. Thinning of the left carotid canal wall along the posterior left sphenoid sinus. Cannot rule out dehiscence.  - Continue on Flonase and [place] BID   - MRI Sinuses- No definite evidence of CSF leak.  - ENT following    # Nodular Liver with possible Cirrhosis ?   - During admission in April patient was found to have nodular contour of liver on US 4/6/2021. Upon further discussion with patient and Hepatology, she was noted to have history of blood transfusion in 1998 with (+) Hep C AB with negative Hep C RNA. She also reports history of CBD stricture in 1998 in [place], s/p PTC/PTBD.   - CLD work up previously noted with HBsAg negative, HBcIgM negative, Hep E IgG negative, Hep A IgM negative, RUPESH, Anti-LKM and AMA negative, Ferritin 169, AFP within normal limits, ASMA 1:40. MR/MRCP without stricturing or hepatic lesions. No ascites on all imaging and no history of HE. No known varices/ no EGD record on record.   - Echo - EF 73%, unremarkable   - Hepatology consulted, recs apreciated  - S/P liver bx 7/7; prelim results with evidence of hepatic fibrosis but no cirrhosis- f/u final results  - Follow up in Hepatology Fellow clinic.  - MRCP to be done -awaiting results Hepatology aware    # IDDM2, A1C 8.9 (6/2021)   - At home on: Lantus 14U QHS and sliding scale  - In house on: Lantus 15U QHS and Lispro 8U + Moderate ISS in house while on steroids.   - Will trend FS: adjustments will be made after evaluating dinner time FS if warranted     # Ethics/ DISPO/ GOC  - FULL CODE and GOC discussed with Son, Michela ([phone]) who expresses wishes of DNI, however will discuss further for DNR. Family wishes for all medical treatment (including invasive and possible lung transplant) to be performed, however if her condition was to worsen they do not wish for intubation. GOC conversation ongoing.   - PT recommends home with home PT   - Awaiting approval for home NIV machine  - DC planning likely early next week pending approval for NIV machine and completion of IV abx. Will plan for J&J COVID19 vaccine on discharge date.    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 Patient now presented from Pulmonary Office, Dr. Young for worsening SOB, lethargy and cough with thick yellow secretions. Now readmitted for acute on chronic hypercarbic respiratory failure likely in setting of bronchiectasis exacerbation with recurrent PNA.     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   - CT CHEST 6/30 with (+) bronchiectasis   - SCx 6/30 with Pseudomonas Aeurginosa  - Continue on aggressive chest PT with Mucomyst, Duoneb and Chest Vest Q6H   - Continue on Symbicort BID and Singular QD   - s/p Prednisone 40mg QD (6/30 - 7/3)   - s/p Avycaz/ NELLY (6/30 - 7/2) and c/w Cefepime (7/2- until 7/14); Can dc on levaquin  - Hypercarbia improved and now continued on NC QD and AVAPs QHS.   - Will need home NIV set up prior to DC   - Has outpatient f/u appt with Dr. Young on 7/15 per Chart Review for possible Lung transplant  - Fever to 101 wbc increased to 18, CX sent , Scx + moderate gm+ cocci in pairs - Vanco added / afebrile overnight   - WBC trend down this am -await results of cx's  - Vanco level sub therapeutic and dose adjusted fu repeat trough      # Acute on Chronic Sinusitis   - CT SINUS 6/30 with extensive inflammatory changes throughout the paranasal sinuses and their respective drainage pathways, , improved in the right frontal and sphenoid sinuses since comparison. 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 Cannot rule out CSF leak. Thinning of the left carotid canal wall along the posterior left sphenoid sinus. Cannot rule out dehiscence.  - Continue on Flonase and [place] BID   - MRI Sinuses- No definite evidence of CSF leak.  - ENT following    # Nodular Liver with possible Cirrhosis ?   - During admission in April patient was found to have nodular contour of liver on US 4/6/2021. Upon further discussion with patient and Hepatology, she was noted to have history of blood transfusion in 1998 with (+) Hep C AB with negative Hep C RNA. She also reports history of CBD stricture in 1998 in [place], s/p PTC/PTBD.   - CLD work up previously noted with HBsAg negative, HBcIgM negative, Hep E IgG negative, Hep A IgM negative, RUPESH, Anti-LKM and AMA negative, Ferritin 169, AFP within normal limits, ASMA 1:40. MR/MRCP without stricturing or hepatic lesions. No ascites on all imaging and no history of HE. No known varices/ no EGD record on record.   - Echo - EF 73%, unremarkable   - Hepatology consulted, recs apreciated  - S/P liver bx 7/7; prelim results with evidence of hepatic fibrosis but no cirrhosis- f/u final results  - Follow up in Hepatology Fellow clinic.  - MRCP to be done     # IDDM2, A1C 8.9 (6/2021)   - At home on: Lantus 14U QHS and sliding scale  - In house on: Lantus 15U QHS and Lispro 8U + Moderate ISS in house while on steroids.   - Will trend FS: adjustments will be made after evaluating dinner time FS if warranted     # Ethics/ DISPO/ GOC  - FULL CODE and GOC discussed with Son, Michela ([phone]) who expresses wishes of DNI, however will discuss further for DNR. Family wishes for all medical treatment (including invasive and possible lung transplant) to be performed, however if her condition was to worsen they do not wish for intubation. GOC conversation ongoing.   - PT recommends home with home PT   - Awaiting approval for home NIV machine  - DC planning likely early next week pending approval for NIV machine and completion of IV abx. Will plan for J&J COVID19 vaccine on discharge date.    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 Patient now presented from Pulmonary Office, Dr. Young for worsening SOB, lethargy and cough with thick yellow secretions. Now readmitted for acute on chronic hypercarbic respiratory failure likely in setting of bronchiectasis exacerbation with recurrent PNA.     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   - CT CHEST 6/30 with (+) bronchiectasis   - SCx 6/30 with Pseudomonas Aeurginosa  - Continue on aggressive chest PT with Mucomyst, Duoneb and Chest Vest Q6H   - Continue on Symbicort BID and Singular QD   - s/p Prednisone 40mg QD (6/30 - 7/3)   - s/p Avycaz/ NELLY (6/30 - 7/2) and c/w Cefepime (7/2- until 7/14); Can dc on levaquin  - Hypercarbia improved and now continued on NC QD and AVAPs QHS.   - Will need home NIV set up prior to DC   - Has outpatient f/u appt with Dr. Young on 7/15 per Chart Review for possible Lung transplant  - Fever to 101 wbc increased to 18, CX sent , Scx + moderate gm+ cocci in pairs - Vanco added / afebrile overnight   - WBC trend down this am -await results of cx's  - Vanco level sub therapeutic and dose adjusted -FU Sputum cx is normal prashanth     # Acute on Chronic Sinusitis   - CT SINUS 6/30 with extensive inflammatory changes throughout the paranasal sinuses and their respective drainage pathways, , improved in the right frontal and sphenoid sinuses since comparison. 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 Cannot rule out CSF leak. Thinning of the left carotid canal wall along the posterior left sphenoid sinus. Cannot rule out dehiscence.  - Continue on Flonase and [place] BID   - MRI Sinuses- No definite evidence of CSF leak.  - ENT following    # Nodular Liver with possible Cirrhosis ?   - During admission in April patient was found to have nodular contour of liver on US 4/6/2021. Upon further discussion with patient and Hepatology, she was noted to have history of blood transfusion in 1998 with (+) Hep C AB with negative Hep C RNA. She also reports history of CBD stricture in 1998 in [place], s/p PTC/PTBD.   - CLD work up previously noted with HBsAg negative, HBcIgM negative, Hep E IgG negative, Hep A IgM negative, RUPESH, Anti-LKM and AMA negative, Ferritin 169, AFP within normal limits, ASMA 1:40. MR/MRCP without stricturing or hepatic lesions. No ascites on all imaging and no history of HE. No known varices/ no EGD record on record.   - Echo - EF 73%, unremarkable   - Hepatology consulted, recs apreciated  - S/P liver bx 7/7; prelim results with evidence of hepatic fibrosis but no cirrhosis- f/u final results  - Follow up in Hepatology Fellow clinic.  - MRCP to be done     # IDDM2, A1C 8.9 (6/2021)   - At home on: Lantus 14U QHS and sliding scale  - In house on: Lantus 15U QHS and Lispro 8U + Moderate ISS in house while on steroids.   - Will trend FS: adjustments will be made after evaluating dinner time FS if warranted     # Ethics/ DISPO/ GOC  - FULL CODE and GOC discussed with Son, Michela ([phone]) who expresses wishes of DNI, however will discuss further for DNR. Family wishes for all medical treatment (including invasive and possible lung transplant) to be performed, however if her condition was to worsen they do not wish for intubation. GOC conversation ongoing.   - PT recommends home with home PT   - Awaiting approval for home NIV machine  - DC planning likely early next week pending approval for NIV machine and completion of IV abx. Will plan for J&J COVID19 vaccine on discharge date.    # DVT PPX -Lovenox

## 2021-07-12 NOTE — PROGRESS NOTE ADULT - ATTENDING COMMENTS
seen and examined with NP  agree with above  sputum cx negative. afebrile. d/c vanco  completing cefepime tomorrow for pseud bronchiectasis exacerbation  MRCP today.  d/c plan for tomorrow, EDIN vaccine upon d/c

## 2021-07-13 ENCOUNTER — RX RENEWAL (OUTPATIENT)
Age: 66
End: 2021-07-13

## 2021-07-13 LAB
ALBUMIN SERPL ELPH-MCNC: 3.1 G/DL — LOW (ref 3.3–5)
ALP SERPL-CCNC: 192 U/L — HIGH (ref 40–120)
ALT FLD-CCNC: 46 U/L — HIGH (ref 4–33)
ANION GAP SERPL CALC-SCNC: 8 MMOL/L — SIGNIFICANT CHANGE UP (ref 7–14)
AST SERPL-CCNC: 36 U/L — HIGH (ref 4–32)
BILIRUB SERPL-MCNC: <0.2 MG/DL — SIGNIFICANT CHANGE UP (ref 0.2–1.2)
BUN SERPL-MCNC: 14 MG/DL — SIGNIFICANT CHANGE UP (ref 7–23)
CALCIUM SERPL-MCNC: 9.6 MG/DL — SIGNIFICANT CHANGE UP (ref 8.4–10.5)
CHLORIDE SERPL-SCNC: 104 MMOL/L — SIGNIFICANT CHANGE UP (ref 98–107)
CO2 SERPL-SCNC: 32 MMOL/L — HIGH (ref 22–31)
CREAT SERPL-MCNC: 0.33 MG/DL — LOW (ref 0.5–1.3)
GLUCOSE BLDC GLUCOMTR-MCNC: 107 MG/DL — HIGH (ref 70–99)
GLUCOSE BLDC GLUCOMTR-MCNC: 108 MG/DL — HIGH (ref 70–99)
GLUCOSE BLDC GLUCOMTR-MCNC: 136 MG/DL — HIGH (ref 70–99)
GLUCOSE BLDC GLUCOMTR-MCNC: 200 MG/DL — HIGH (ref 70–99)
GLUCOSE BLDC GLUCOMTR-MCNC: 213 MG/DL — HIGH (ref 70–99)
GLUCOSE SERPL-MCNC: 105 MG/DL — HIGH (ref 70–99)
HCT VFR BLD CALC: 36.5 % — SIGNIFICANT CHANGE UP (ref 34.5–45)
HGB BLD-MCNC: 10.9 G/DL — LOW (ref 11.5–15.5)
MAGNESIUM SERPL-MCNC: 1.9 MG/DL — SIGNIFICANT CHANGE UP (ref 1.6–2.6)
MCHC RBC-ENTMCNC: 27.2 PG — SIGNIFICANT CHANGE UP (ref 27–34)
MCHC RBC-ENTMCNC: 29.9 GM/DL — LOW (ref 32–36)
MCV RBC AUTO: 91 FL — SIGNIFICANT CHANGE UP (ref 80–100)
NRBC # BLD: 0 /100 WBCS — SIGNIFICANT CHANGE UP
NRBC # FLD: 0 K/UL — SIGNIFICANT CHANGE UP
PHOSPHATE SERPL-MCNC: 3.4 MG/DL — SIGNIFICANT CHANGE UP (ref 2.5–4.5)
PLATELET # BLD AUTO: 159 K/UL — SIGNIFICANT CHANGE UP (ref 150–400)
POTASSIUM SERPL-MCNC: 4 MMOL/L — SIGNIFICANT CHANGE UP (ref 3.5–5.3)
POTASSIUM SERPL-SCNC: 4 MMOL/L — SIGNIFICANT CHANGE UP (ref 3.5–5.3)
PROT SERPL-MCNC: 6.6 G/DL — SIGNIFICANT CHANGE UP (ref 6–8.3)
RBC # BLD: 4.01 M/UL — SIGNIFICANT CHANGE UP (ref 3.8–5.2)
RBC # FLD: 13.7 % — SIGNIFICANT CHANGE UP (ref 10.3–14.5)
SODIUM SERPL-SCNC: 144 MMOL/L — SIGNIFICANT CHANGE UP (ref 135–145)
WBC # BLD: 6.34 K/UL — SIGNIFICANT CHANGE UP (ref 3.8–10.5)
WBC # FLD AUTO: 6.34 K/UL — SIGNIFICANT CHANGE UP (ref 3.8–10.5)

## 2021-07-13 PROCEDURE — 99233 SBSQ HOSP IP/OBS HIGH 50: CPT | Mod: GC

## 2021-07-13 RX ORDER — ACETYLCYSTEINE 200 MG/ML
4 VIAL (ML) MISCELLANEOUS ONCE
Refills: 0 | Status: COMPLETED | OUTPATIENT
Start: 2021-07-13 | End: 2021-07-13

## 2021-07-13 RX ORDER — IPRATROPIUM/ALBUTEROL SULFATE 18-103MCG
3 AEROSOL WITH ADAPTER (GRAM) INHALATION ONCE
Refills: 0 | Status: COMPLETED | OUTPATIENT
Start: 2021-07-13 | End: 2021-07-13

## 2021-07-13 RX ORDER — CLOTRIMAZOLE 10 MG/1
10 LOZENGE ORAL DAILY
Qty: 60 | Refills: 0 | Status: ACTIVE | COMMUNITY
Start: 2021-06-11 | End: 1900-01-01

## 2021-07-13 RX ORDER — ACETAMINOPHEN 500 MG
650 TABLET ORAL EVERY 12 HOURS
Refills: 0 | Status: DISCONTINUED | OUTPATIENT
Start: 2021-07-13 | End: 2021-07-19

## 2021-07-13 RX ADMIN — Medication 4 MILLILITER(S): at 19:08

## 2021-07-13 RX ADMIN — Medication 2 SPRAY(S): at 06:34

## 2021-07-13 RX ADMIN — ENOXAPARIN SODIUM 40 MILLIGRAM(S): 100 INJECTION SUBCUTANEOUS at 12:18

## 2021-07-13 RX ADMIN — Medication 40 MILLIGRAM(S): at 18:32

## 2021-07-13 RX ADMIN — SODIUM CHLORIDE 3 MILLILITER(S): 9 INJECTION INTRAMUSCULAR; INTRAVENOUS; SUBCUTANEOUS at 15:17

## 2021-07-13 RX ADMIN — CEFEPIME 100 MILLIGRAM(S): 1 INJECTION, POWDER, FOR SOLUTION INTRAMUSCULAR; INTRAVENOUS at 17:31

## 2021-07-13 RX ADMIN — Medication 2 SPRAY(S): at 17:31

## 2021-07-13 RX ADMIN — BUDESONIDE AND FORMOTEROL FUMARATE DIHYDRATE 2 PUFF(S): 160; 4.5 AEROSOL RESPIRATORY (INHALATION) at 09:51

## 2021-07-13 RX ADMIN — Medication 2 SPRAY(S): at 06:35

## 2021-07-13 RX ADMIN — MIRTAZAPINE 7.5 MILLIGRAM(S): 45 TABLET, ORALLY DISINTEGRATING ORAL at 22:04

## 2021-07-13 RX ADMIN — Medication 8 UNIT(S): at 17:57

## 2021-07-13 RX ADMIN — Medication 3 MILLILITER(S): at 03:43

## 2021-07-13 RX ADMIN — Medication 325 MILLIGRAM(S): at 12:19

## 2021-07-13 RX ADMIN — AMLODIPINE BESYLATE 5 MILLIGRAM(S): 2.5 TABLET ORAL at 06:34

## 2021-07-13 RX ADMIN — Medication 8 UNIT(S): at 08:29

## 2021-07-13 RX ADMIN — CHLORHEXIDINE GLUCONATE 1 APPLICATION(S): 213 SOLUTION TOPICAL at 12:24

## 2021-07-13 RX ADMIN — Medication 3 MILLILITER(S): at 22:25

## 2021-07-13 RX ADMIN — BUDESONIDE AND FORMOTEROL FUMARATE DIHYDRATE 2 PUFF(S): 160; 4.5 AEROSOL RESPIRATORY (INHALATION) at 22:25

## 2021-07-13 RX ADMIN — FAMOTIDINE 40 MILLIGRAM(S): 10 INJECTION INTRAVENOUS at 22:01

## 2021-07-13 RX ADMIN — SIMETHICONE 80 MILLIGRAM(S): 80 TABLET, CHEWABLE ORAL at 12:18

## 2021-07-13 RX ADMIN — CEFEPIME 100 MILLIGRAM(S): 1 INJECTION, POWDER, FOR SOLUTION INTRAMUSCULAR; INTRAVENOUS at 06:40

## 2021-07-13 RX ADMIN — Medication 3 MILLILITER(S): at 18:44

## 2021-07-13 RX ADMIN — AER TRAVELER 1 APPLICATION(S): 0.5 SOLUTION RECTAL; TOPICAL at 06:36

## 2021-07-13 RX ADMIN — PANTOPRAZOLE SODIUM 40 MILLIGRAM(S): 20 TABLET, DELAYED RELEASE ORAL at 06:39

## 2021-07-13 RX ADMIN — AER TRAVELER 1 APPLICATION(S): 0.5 SOLUTION RECTAL; TOPICAL at 13:49

## 2021-07-13 RX ADMIN — SODIUM CHLORIDE 3 MILLILITER(S): 9 INJECTION INTRAMUSCULAR; INTRAVENOUS; SUBCUTANEOUS at 09:50

## 2021-07-13 RX ADMIN — Medication 3 MILLILITER(S): at 15:16

## 2021-07-13 RX ADMIN — SODIUM CHLORIDE 3 MILLILITER(S): 9 INJECTION INTRAMUSCULAR; INTRAVENOUS; SUBCUTANEOUS at 03:46

## 2021-07-13 RX ADMIN — Medication 3 MILLILITER(S): at 09:50

## 2021-07-13 RX ADMIN — SENNA PLUS 2 TABLET(S): 8.6 TABLET ORAL at 22:01

## 2021-07-13 RX ADMIN — INSULIN GLARGINE 15 UNIT(S): 100 INJECTION, SOLUTION SUBCUTANEOUS at 22:02

## 2021-07-13 RX ADMIN — Medication 500 MILLIGRAM(S): at 12:19

## 2021-07-13 RX ADMIN — SODIUM CHLORIDE 3 MILLILITER(S): 9 INJECTION INTRAMUSCULAR; INTRAVENOUS; SUBCUTANEOUS at 22:25

## 2021-07-13 RX ADMIN — MONTELUKAST 10 MILLIGRAM(S): 4 TABLET, CHEWABLE ORAL at 12:18

## 2021-07-13 NOTE — PROGRESS NOTE ADULT - ASSESSMENT
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 Patient now presented from Pulmonary Office, Dr. Young for worsening SOB, lethargy and cough with thick yellow secretions. Now readmitted for acute on chronic hypercarbic respiratory failure likely in setting of bronchiectasis exacerbation with recurrent PNA.     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   - CT CHEST 6/30 with (+) bronchiectasis   - SCx 6/30 with Pseudomonas Aeurginosa  - Continue on aggressive chest PT with Mucomyst, Duoneb and Chest Vest Q6H   - Continue on Symbicort BID and Singular QD   - s/p Prednisone 40mg QD (6/30 - 7/3)   - s/p Avycaz/ NELLY (6/30 - 7/2) and c/w Cefepime (7/2- until 7/14); Can dc on levaquin  - Hypercarbia improved and now continued on NC QD and AVAPs QHS.   - Will need home NIV set up prior to DC   - Has outpatient f/u appt with Dr. Young on 7/15 per Chart Review for possible Lung transplant  - Fever to 101 wbc increased to 18, CX sent , Scx + moderate gm+ cocci in pairs - Vanco added / afebrile overnight   - WBC trend down this am -await results of cx's  - Vanco level sub therapeutic and dose adjusted -FU Sputum cx is normal prashanth Vanco dc     # Acute on Chronic Sinusitis   - CT SINUS 6/30 with extensive inflammatory changes throughout the paranasal sinuses and their respective drainage pathways, , improved in the right frontal and sphenoid sinuses since comparison. 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 Cannot rule out CSF leak. Thinning of the left carotid canal wall along the posterior left sphenoid sinus. Cannot rule out dehiscence.  - Continue on Flonase and [place] BID   - MRI Sinuses- No definite evidence of CSF leak.  - ENT following    # Nodular Liver with possible Cirrhosis ?   - During admission in April patient was found to have nodular contour of liver on US 4/6/2021. Upon further discussion with patient and Hepatology, she was noted to have history of blood transfusion in 1998 with (+) Hep C AB with negative Hep C RNA. She also reports history of CBD stricture in 1998 in [place], s/p PTC/PTBD.   - CLD work up previously noted with HBsAg negative, HBcIgM negative, Hep E IgG negative, Hep A IgM negative, RUPESH, Anti-LKM and AMA negative, Ferritin 169, AFP within normal limits, ASMA 1:40. MR/MRCP without stricturing or hepatic lesions. No ascites on all imaging and no history of HE. No known varices/ no EGD record on record.   - Echo - EF 73%, unremarkable   - Hepatology consulted, recs apreciated  - S/P liver bx 7/7; prelim results with evidence of hepatic fibrosis but no cirrhosis- f/u final results  - Liver bx with mild portal fibrosis   - Follow up in Hepatology Fellow clinic.  - MRCP  done-     # IDDM2, A1C 8.9 (6/2021)   - At home on: Lantus 14U QHS and sliding scale  - In house on: Lantus 15U QHS and Lispro 8U + Moderate ISS in house while on steroids.   - Will trend FS: adjustments will be made after evaluating dinner time FS if warranted     # Ethics/ DISPO/ GOC  - FULL CODE and GOC discussed with Son, Michela ([phone]) who expresses wishes of DNI, however will discuss further for DNR. Family wishes for all medical treatment (including invasive and possible lung transplant) to be performed, however if her condition was to worsen they do not wish for intubation. GOC conversation ongoing.   - PT recommends home with home PT   - Awaiting approval for home NIV machine  - DC planning likely early next week pending approval for NIV machine and completion of IV abx. Will plan for J&J COVID19 vaccine on discharge date.    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 Patient now presented from Pulmonary Office, Dr. Young for worsening SOB, lethargy and cough with thick yellow secretions. Now readmitted for acute on chronic hypercarbic respiratory failure likely in setting of bronchiectasis exacerbation with recurrent PNA.     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   - CT CHEST 6/30 with (+) bronchiectasis   - SCx 6/30 with Pseudomonas Aeurginosa  - Continue on aggressive chest PT with Mucomyst, Duoneb and Chest Vest Q6H   - Continue on Symbicort BID and Singular QD   - s/p Prednisone 40mg QD (6/30 - 7/3)   - s/p Avycaz/ NELLY (6/30 - 7/2) and c/w Cefepime (7/2- until 7/14); Can dc on levaquin  - Hypercarbia improved and now continued on NC QD and AVAPs QHS.   - Will need home NIV set up prior to DC   - Has outpatient f/u appt with Dr. Young on 7/15 per Chart Review for possible Lung transplant  - Fever to 101 wbc increased to 18, CX sent , Scx + moderate gm+ cocci in pairs - Vanco added / afebrile overnight   - WBC trend down this am -await results of cx's  - Vanco level sub therapeutic and dose adjusted   - FU Sputum cx is normal prashanth Vanco dc   - Awaiting insurance approval for NIV at home     # Acute on Chronic Sinusitis   - CT SINUS 6/30 with extensive inflammatory changes throughout the paranasal sinuses and their respective drainage pathways, , improved in the right frontal and sphenoid sinuses since comparison. 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 Cannot rule out CSF leak. Thinning of the left carotid canal wall along the posterior left sphenoid sinus. Cannot rule out dehiscence.  - Continue on Flonase and [place] BID   - MRI Sinuses- No definite evidence of CSF leak.  - ENT following    # Nodular Liver with possible Cirrhosis ?   - During admission in April patient was found to have nodular contour of liver on US 4/6/2021. Upon further discussion with patient and Hepatology, she was noted to have history of blood transfusion in 1998 with (+) Hep C AB with negative Hep C RNA. She also reports history of CBD stricture in 1998 in [place], s/p PTC/PTBD.   - CLD work up previously noted with HBsAg negative, HBcIgM negative, Hep E IgG negative, Hep A IgM negative, RUPESH, Anti-LKM and AMA negative, Ferritin 169, AFP within normal limits, ASMA 1:40. MR/MRCP without stricturing or hepatic lesions. No ascites on all imaging and no history of HE. No known varices/ no EGD record on record.   - Echo - EF 73%, unremarkable   - Hepatology consulted, recs apreciated  - S/P liver bx 7/7; prelim results with evidence of hepatic fibrosis but no cirrhosis- f/u final results  - Liver bx with mild portal fibrosis   - Follow up in Hepatology Fellow clinic.  - MRCP  done- 7/12 awaiting report     # IDDM2, A1C 8.9 (6/2021)   - At home on: Lantus 14U QHS and sliding scale  - In house on: Lantus 15U QHS and Lispro 8U + Moderate ISS in house while on steroids.   - Will trend FS: adjustments will be made after evaluating dinner time FS if warranted     # Ethics/ DISPO/ GOC  - FULL CODE and GOC discussed with Son, Michela ([phone]) who expresses wishes of DNI, however will discuss further for DNR. Family wishes for all medical treatment (including invasive and possible lung transplant) to be performed, however if her condition was to worsen they do not wish for intubation. GOC conversation ongoing.   - PT recommends home with home PT   - Awaiting approval for home NIV machine  - DC planning likely early next week pending approval for NIV machine and completion of IV abx. Will plan for J&J COVID19 vaccine on discharge date.    # DVT PPX -Lovenox

## 2021-07-13 NOTE — PROGRESS NOTE ADULT - SUBJECTIVE AND OBJECTIVE BOX
CHIEF COMPLAINT: Patient is a 66y old  Female who presents with a chief complaint of SOB (12 Jul 2021 15:28)      Interval Events: Pt seen and evaluated at bedside     REVIEW OF SYSTEMS:  Constitutional:   Eyes:  ENT:  CV:  Resp:  GI:  :  MSK:  Integumentary:  Neurological:  Psychiatric:  Endocrine:  Hematologic/Lymphatic:  Allergic/Immunologic:  [ ] All other systems negative      OBJECTIVE:  ICU Vital Signs Last 24 Hrs  T(C): 36.4 (13 Jul 2021 06:33), Max: 37 (12 Jul 2021 21:15)  T(F): 97.5 (13 Jul 2021 06:33), Max: 98.6 (12 Jul 2021 21:15)  HR: 88 (13 Jul 2021 06:33) (76 - 99)  BP: 151/84 (13 Jul 2021 06:33) (142/69 - 163/90)  BP(mean): --  ABP: --  ABP(mean): --  RR: 16 (13 Jul 2021 06:33) (16 - 22)  SpO2: 96% (13 Jul 2021 06:33) (91% - 98%)    Mode: NIV (Noninvasive Ventilation), RR (machine): 18, TV (machine): 380, FiO2: 28, PEEP: 5, ITime: 1, PIP: 15    07-12 @ 07:01  -  07-13 @ 06:55  --------------------------------------------------------  IN: 50 mL / OUT: 0 mL / NET: 50 mL      CAPILLARY BLOOD GLUCOSE      POCT Blood Glucose.: 357 mg/dL (12 Jul 2021 21:18)      HOSPITAL MEDICATIONS:  MEDICATIONS  (STANDING):  albuterol/ipratropium for Nebulization 3 milliLiter(s) Nebulizer every 6 hours  amLODIPine   Tablet 5 milliGRAM(s) Oral daily  ascorbic acid 500 milliGRAM(s) Oral daily  budesonide 160 MICROgram(s)/formoterol 4.5 MICROgram(s) Inhaler 2 Puff(s) Inhalation two times a day  cefepime   IVPB 2000 milliGRAM(s) IV Intermittent every 12 hours  chlorhexidine 4% Liquid 1 Application(s) Topical daily  dextrose 40% Gel 15 Gram(s) Oral once  dextrose 5%. 1000 milliLiter(s) (50 mL/Hr) IV Continuous <Continuous>  dextrose 5%. 1000 milliLiter(s) (100 mL/Hr) IV Continuous <Continuous>  dextrose 50% Injectable 25 Gram(s) IV Push once  dextrose 50% Injectable 12.5 Gram(s) IV Push once  dextrose 50% Injectable 25 Gram(s) IV Push once  enoxaparin Injectable 40 milliGRAM(s) SubCutaneous daily  famotidine    Tablet 40 milliGRAM(s) Oral at bedtime  ferrous    sulfate 325 milliGRAM(s) Oral daily  fluticasone propionate 50 MICROgram(s)/spray Nasal Spray 2 Spray(s) Both Nostrils two times a day  glucagon  Injectable 1 milliGRAM(s) IntraMuscular once  insulin glargine Injectable (LANTUS) 15 Unit(s) SubCutaneous at bedtime  insulin lispro (ADMELOG) corrective regimen sliding scale   SubCutaneous three times a day before meals  insulin lispro (ADMELOG) corrective regimen sliding scale   SubCutaneous at bedtime  insulin lispro Injectable (ADMELOG) 8 Unit(s) SubCutaneous three times a day before meals  mirtazapine 7.5 milliGRAM(s) Oral at bedtime  montelukast 10 milliGRAM(s) Oral daily  pantoprazole    Tablet 40 milliGRAM(s) Oral before breakfast  senna 2 Tablet(s) Oral at bedtime  sodium chloride 0.65% Nasal 2 Spray(s) Both Nostrils every 12 hours  sodium chloride 3%  Inhalation 3 milliLiter(s) Inhalation every 6 hours  witch hazel Pads 1 Application(s) Topical three times a day    MEDICATIONS  (PRN):  acetaminophen   Tablet .. 650 milliGRAM(s) Oral every 6 hours PRN Temp greater or equal to 38C (100.4F)  acetaminophen   Tablet .. 650 milliGRAM(s) Oral every 4 hours PRN Mild Pain (1 - 3)  guaiFENesin Oral Liquid (Sugar-Free) 100 milliGRAM(s) Oral every 6 hours PRN Cough  simethicone 80 milliGRAM(s) Chew three times a day PRN Gas      LABS:                        10.9   6.34  )-----------( 159      ( 13 Jul 2021 06:25 )             36.5     07-12    143  |  101  |  12  ----------------------------<  104<H>  3.9   |  34<H>  |  0.35<L>    Ca    9.4      12 Jul 2021 04:12  Phos  3.2     07-12  Mg     1.90     07-12                MICROBIOLOGY:     RADIOLOGY:  [ ] Reviewed and interpreted by me    PULMONARY FUNCTION TESTS:    EKG: CHIEF COMPLAINT: Patient is a 66y old  Female who presents with a chief complaint of SOB (12 Jul 2021 15:28)      Interval Events: Pt seen and evaluated at bedside     REVIEW OF SYSTEMS:  Constitutional: Feels cold, denies pain   CV: Denies   Resp: + cough , + [place]  GI: Gas Pain after chest vest  [ x] All other systems negative      OBJECTIVE:  ICU Vital Signs Last 24 Hrs  T(C): 36.4 (13 Jul 2021 06:33), Max: 37 (12 Jul 2021 21:15)  T(F): 97.5 (13 Jul 2021 06:33), Max: 98.6 (12 Jul 2021 21:15)  HR: 88 (13 Jul 2021 06:33) (76 - 99)  BP: 151/84 (13 Jul 2021 06:33) (142/69 - 163/90)  BP(mean): --  ABP: --  ABP(mean): --  RR: 16 (13 Jul 2021 06:33) (16 - 22)  SpO2: 96% (13 Jul 2021 06:33) (91% - 98%)    Mode: NIV (Noninvasive Ventilation), RR (machine): 18, TV (machine): 380, FiO2: 28, PEEP: 5, ITime: 1, PIP: 15    07-12 @ 07:01  -  07-13 @ 06:55  --------------------------------------------------------  IN: 50 mL / OUT: 0 mL / NET: 50 mL      CAPILLARY BLOOD GLUCOSE      POCT Blood Glucose.: 357 mg/dL (12 Jul 2021 21:18)      HOSPITAL MEDICATIONS:  MEDICATIONS  (STANDING):  albuterol/ipratropium for Nebulization 3 milliLiter(s) Nebulizer every 6 hours  amLODIPine   Tablet 5 milliGRAM(s) Oral daily  ascorbic acid 500 milliGRAM(s) Oral daily  budesonide 160 MICROgram(s)/formoterol 4.5 MICROgram(s) Inhaler 2 Puff(s) Inhalation two times a day  cefepime   IVPB 2000 milliGRAM(s) IV Intermittent every 12 hours  chlorhexidine 4% Liquid 1 Application(s) Topical daily  dextrose 40% Gel 15 Gram(s) Oral once  dextrose 5%. 1000 milliLiter(s) (50 mL/Hr) IV Continuous <Continuous>  dextrose 5%. 1000 milliLiter(s) (100 mL/Hr) IV Continuous <Continuous>  dextrose 50% Injectable 25 Gram(s) IV Push once  dextrose 50% Injectable 12.5 Gram(s) IV Push once  dextrose 50% Injectable 25 Gram(s) IV Push once  enoxaparin Injectable 40 milliGRAM(s) SubCutaneous daily  famotidine    Tablet 40 milliGRAM(s) Oral at bedtime  ferrous    sulfate 325 milliGRAM(s) Oral daily  fluticasone propionate 50 MICROgram(s)/spray Nasal Spray 2 Spray(s) Both Nostrils two times a day  glucagon  Injectable 1 milliGRAM(s) IntraMuscular once  insulin glargine Injectable (LANTUS) 15 Unit(s) SubCutaneous at bedtime  insulin lispro (ADMELOG) corrective regimen sliding scale   SubCutaneous three times a day before meals  insulin lispro (ADMELOG) corrective regimen sliding scale   SubCutaneous at bedtime  insulin lispro Injectable (ADMELOG) 8 Unit(s) SubCutaneous three times a day before meals  mirtazapine 7.5 milliGRAM(s) Oral at bedtime  montelukast 10 milliGRAM(s) Oral daily  pantoprazole    Tablet 40 milliGRAM(s) Oral before breakfast  senna 2 Tablet(s) Oral at bedtime  sodium chloride 0.65% Nasal 2 Spray(s) Both Nostrils every 12 hours  sodium chloride 3%  Inhalation 3 milliLiter(s) Inhalation every 6 hours  witch hazel Pads 1 Application(s) Topical three times a day    MEDICATIONS  (PRN):  acetaminophen   Tablet .. 650 milliGRAM(s) Oral every 6 hours PRN Temp greater or equal to 38C (100.4F)  acetaminophen   Tablet .. 650 milliGRAM(s) Oral every 4 hours PRN Mild Pain (1 - 3)  guaiFENesin Oral Liquid (Sugar-Free) 100 milliGRAM(s) Oral every 6 hours PRN Cough  simethicone 80 milliGRAM(s) Chew three times a day PRN Gas      LABS:                        10.9   6.34  )-----------( 159      ( 13 Jul 2021 06:25 )             36.5     07-12    143  |  101  |  12  ----------------------------<  104<H>  3.9   |  34<H>  |  0.35<L>    Ca    9.4      12 Jul 2021 04:12  Phos  3.2     07-12  Mg     1.90     07-12                MICROBIOLOGY:     RADIOLOGY:  [ ] Reviewed and interpreted by me    PULMONARY FUNCTION TESTS:    EKG:

## 2021-07-13 NOTE — CHART NOTE - NSCHARTNOTEFT_GEN_A_CORE
Pt with increased work of breathing /trying to cough secretions   Using avaps during day   Chest with bilat wheezing /rhonchi   Prednisone ordered given now    Would do stat mucomyst with duonebs x 1 now   CHest vest / acapella device   Return to avaps as needed   Will fu - report to oncoming ACP

## 2021-07-13 NOTE — CHART NOTE - NSCHARTNOTESELECT_GEN_ALL_CORE
Event Note
[facility] ACP/Event Note
Off Service Note
Pre IR note/Event Note

## 2021-07-13 NOTE — PROGRESS NOTE ADULT - ATTENDING COMMENTS
seen and examined with NP, agree with above  more wheezing today, phlegm. does not tolerate vest  start prednisone  completing abx

## 2021-07-14 LAB
ALBUMIN SERPL ELPH-MCNC: 3.3 G/DL — SIGNIFICANT CHANGE UP (ref 3.3–5)
ALP SERPL-CCNC: 263 U/L — HIGH (ref 40–120)
ALT FLD-CCNC: 57 U/L — HIGH (ref 4–33)
ANION GAP SERPL CALC-SCNC: 7 MMOL/L — SIGNIFICANT CHANGE UP (ref 7–14)
AST SERPL-CCNC: 51 U/L — HIGH (ref 4–32)
BILIRUB SERPL-MCNC: <0.2 MG/DL — SIGNIFICANT CHANGE UP (ref 0.2–1.2)
BUN SERPL-MCNC: 18 MG/DL — SIGNIFICANT CHANGE UP (ref 7–23)
CALCIUM SERPL-MCNC: 9.8 MG/DL — SIGNIFICANT CHANGE UP (ref 8.4–10.5)
CHLORIDE SERPL-SCNC: 98 MMOL/L — SIGNIFICANT CHANGE UP (ref 98–107)
CO2 SERPL-SCNC: 33 MMOL/L — HIGH (ref 22–31)
CREAT SERPL-MCNC: 0.37 MG/DL — LOW (ref 0.5–1.3)
GLUCOSE BLDC GLUCOMTR-MCNC: 193 MG/DL — HIGH (ref 70–99)
GLUCOSE BLDC GLUCOMTR-MCNC: 223 MG/DL — HIGH (ref 70–99)
GLUCOSE BLDC GLUCOMTR-MCNC: 234 MG/DL — HIGH (ref 70–99)
GLUCOSE SERPL-MCNC: 217 MG/DL — HIGH (ref 70–99)
HCT VFR BLD CALC: 36.3 % — SIGNIFICANT CHANGE UP (ref 34.5–45)
HGB BLD-MCNC: 11 G/DL — LOW (ref 11.5–15.5)
MAGNESIUM SERPL-MCNC: 2 MG/DL — SIGNIFICANT CHANGE UP (ref 1.6–2.6)
MCHC RBC-ENTMCNC: 27.2 PG — SIGNIFICANT CHANGE UP (ref 27–34)
MCHC RBC-ENTMCNC: 30.3 GM/DL — LOW (ref 32–36)
MCV RBC AUTO: 89.9 FL — SIGNIFICANT CHANGE UP (ref 80–100)
NRBC # BLD: 0 /100 WBCS — SIGNIFICANT CHANGE UP
NRBC # FLD: 0 K/UL — SIGNIFICANT CHANGE UP
PHOSPHATE SERPL-MCNC: 3.9 MG/DL — SIGNIFICANT CHANGE UP (ref 2.5–4.5)
PLATELET # BLD AUTO: 184 K/UL — SIGNIFICANT CHANGE UP (ref 150–400)
POTASSIUM SERPL-MCNC: 4.8 MMOL/L — SIGNIFICANT CHANGE UP (ref 3.5–5.3)
POTASSIUM SERPL-SCNC: 4.8 MMOL/L — SIGNIFICANT CHANGE UP (ref 3.5–5.3)
PROT SERPL-MCNC: 7.2 G/DL — SIGNIFICANT CHANGE UP (ref 6–8.3)
RBC # BLD: 4.04 M/UL — SIGNIFICANT CHANGE UP (ref 3.8–5.2)
RBC # FLD: 13.5 % — SIGNIFICANT CHANGE UP (ref 10.3–14.5)
SODIUM SERPL-SCNC: 138 MMOL/L — SIGNIFICANT CHANGE UP (ref 135–145)
WBC # BLD: 5.91 K/UL — SIGNIFICANT CHANGE UP (ref 3.8–10.5)
WBC # FLD AUTO: 5.91 K/UL — SIGNIFICANT CHANGE UP (ref 3.8–10.5)

## 2021-07-14 PROCEDURE — 71275 CT ANGIOGRAPHY CHEST: CPT | Mod: 26

## 2021-07-14 PROCEDURE — 99233 SBSQ HOSP IP/OBS HIGH 50: CPT | Mod: GC

## 2021-07-14 PROCEDURE — 99232 SBSQ HOSP IP/OBS MODERATE 35: CPT | Mod: GC

## 2021-07-14 PROCEDURE — 93970 EXTREMITY STUDY: CPT | Mod: 26

## 2021-07-14 RX ADMIN — BUDESONIDE AND FORMOTEROL FUMARATE DIHYDRATE 2 PUFF(S): 160; 4.5 AEROSOL RESPIRATORY (INHALATION) at 10:14

## 2021-07-14 RX ADMIN — SODIUM CHLORIDE 3 MILLILITER(S): 9 INJECTION INTRAMUSCULAR; INTRAVENOUS; SUBCUTANEOUS at 04:46

## 2021-07-14 RX ADMIN — Medication 40 MILLIGRAM(S): at 06:39

## 2021-07-14 RX ADMIN — PANTOPRAZOLE SODIUM 40 MILLIGRAM(S): 20 TABLET, DELAYED RELEASE ORAL at 06:39

## 2021-07-14 RX ADMIN — ENOXAPARIN SODIUM 40 MILLIGRAM(S): 100 INJECTION SUBCUTANEOUS at 15:12

## 2021-07-14 RX ADMIN — Medication 8 UNIT(S): at 08:19

## 2021-07-14 RX ADMIN — SODIUM CHLORIDE 3 MILLILITER(S): 9 INJECTION INTRAMUSCULAR; INTRAVENOUS; SUBCUTANEOUS at 09:40

## 2021-07-14 RX ADMIN — Medication 3 MILLILITER(S): at 04:44

## 2021-07-14 RX ADMIN — Medication 2 SPRAY(S): at 06:38

## 2021-07-14 RX ADMIN — Medication 2: at 08:19

## 2021-07-14 RX ADMIN — Medication 3 MILLILITER(S): at 15:49

## 2021-07-14 RX ADMIN — SENNA PLUS 2 TABLET(S): 8.6 TABLET ORAL at 21:32

## 2021-07-14 RX ADMIN — FAMOTIDINE 40 MILLIGRAM(S): 10 INJECTION INTRAVENOUS at 21:33

## 2021-07-14 RX ADMIN — SODIUM CHLORIDE 3 MILLILITER(S): 9 INJECTION INTRAMUSCULAR; INTRAVENOUS; SUBCUTANEOUS at 22:41

## 2021-07-14 RX ADMIN — SODIUM CHLORIDE 3 MILLILITER(S): 9 INJECTION INTRAMUSCULAR; INTRAVENOUS; SUBCUTANEOUS at 15:49

## 2021-07-14 RX ADMIN — AMLODIPINE BESYLATE 5 MILLIGRAM(S): 2.5 TABLET ORAL at 06:39

## 2021-07-14 RX ADMIN — Medication 3 MILLILITER(S): at 22:36

## 2021-07-14 RX ADMIN — Medication 1 MILLIGRAM(S): at 15:12

## 2021-07-14 RX ADMIN — INSULIN GLARGINE 15 UNIT(S): 100 INJECTION, SOLUTION SUBCUTANEOUS at 21:41

## 2021-07-14 RX ADMIN — BUDESONIDE AND FORMOTEROL FUMARATE DIHYDRATE 2 PUFF(S): 160; 4.5 AEROSOL RESPIRATORY (INHALATION) at 22:47

## 2021-07-14 RX ADMIN — Medication 4: at 11:47

## 2021-07-14 RX ADMIN — Medication 3 MILLILITER(S): at 09:40

## 2021-07-14 RX ADMIN — MIRTAZAPINE 7.5 MILLIGRAM(S): 45 TABLET, ORALLY DISINTEGRATING ORAL at 21:33

## 2021-07-14 NOTE — PROGRESS NOTE ADULT - ATTENDING COMMENTS
Patient with severe chronic pulmonary disease being evaluated for lung transplant and found to have imaging suggesting cirrhosis. Has now had liver biopsy which was small in size but did not confirm cirrhosis with only portal fibrosis. Review of new MRI shows liver morphology with a notched contour and large portal vein but no findings of varices or portal hypertension. findings are consistent but not diagnostic of cirrhosis. Wedged hepatic vein pressure did not show evidence of portal hypertension. Discussed with Pulmonary attending Dr Lisker. No additional liver evaluation planned for inpatient stay.

## 2021-07-14 NOTE — PROGRESS NOTE ADULT - ASSESSMENT
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 Hepatology consulted to assist with workup/evaluation of possible underlying cirrhosis in the setting of pending lung transplant evaluation. H/o blood transfusion 1998. 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 CLD work up previously: HBsAg neg, HbcIgM neg, c Total neg, sAb neg, IgG, IgA, IgM wnl, RUPESH, anti-LKM, AMA negative, Hepatitis A neg, ferritin 169, AFP wnl, ASMA 1:40. Per MRCP from 04/2021, pt without stricturing or hepatic lesions. Workup for cirrhosis so far was notable for positive ASMA of 1:40, Child Maria Class A; MELD Na 9; FIB4- 1.96 which suggested unlikely advanced fibrosis, normal PLT, (She had positive anti_HCV but negative HCV PCR, no evidence of active or chronic HCV), A1AT was 163 (normal) this admission. Was never treated for chronic HCV. s/p transjugular liver bx by IR on 7/7 with 3 mmHg wedged - RA gradient; 2 core specimens were obtained. No evidence of physiologic or pathologic cirrhosis (RA wedge gradient during TJ bx was 3 mmHg and s/p liver bx 7/7 showing mild portal fibrosis; no cirrhosis). MR abd + MRCP showing cirrhotic morphology w/o focal hepatic lesion.      Impression:  #Mild Fibrosis- FIB4- 1.96  Etiology: unlikely hep C given neg RNA; history of blood transfusion in 1998. 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 CLD work up previously: HBsAg neg, HbcIgM neg, c Total neg, sAb neg, IgG, IgA, IgM wnl, RUPESH, anti-LKM, AMA negative, Hepatitis A neg, ferritin 169, AFP wnl, ASMA 1:40. MR/MRCP without stricturing or hepatic lesions. No ascites on all imaging and no hx of HE. A1AT was 163 (normal) this admission. No evidence of physiologic or pathologic cirrhosis (RA wedge gradient during TJ bx was 3 mmHg and s/p liver bx 7/7 showing mild portal fibrosis; no cirrhosis).     Recommendations:  - patient to follow up in liver clinic in 2 weeks (will message schedulers and provide pt with liver clinic number prior to discharge)  - rest of care per primary team    We will sign off at this time. Please reconsult or page if questions.    Kailey Lewis MD PGY-4  Gastroenterology Fellow  Pager #[phone] ([facility]) or [phone] ([facility])  Available on [employer] Teams.  Please contact on-call GI fellow via answering service ([phone]) after 5pm and before 8am, and on weekends.    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 Hepatology consulted to assist with workup/evaluation of possible underlying cirrhosis in the setting of pending lung transplant evaluation.       #Mild portal fibrosis- FIB4- 1.96  H/o blood transfusion 1998. 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 CLD work up previously: HBsAg neg, HbcIgM neg, c Total neg, sAb neg, IgG, IgA, IgM wnl, RUPESH, anti-LKM, AMA negative, Hepatitis A neg, ferritin 169, AFP wnl, ASMA 1:40. Per MRCP from 04/2021, pt without stricturing or hepatic lesions. Workup for cirrhosis so far was notable for positive ASMA of 1:40, Child [place] Class A; MELD Na 9; FIB4- 1.96 which suggested unlikely advanced fibrosis, normal PLT, (She had positive anti_HCV but negative HCV PCR, no evidence of active or chronic HCV), A1AT was 163 (normal) this admission. Was never treated for chronic HCV. s/p transjugular liver bx by IR on 7/7 with 3 mmHg wedged - RA gradient; 2 core specimens were obtained. No evidence of physiologic or pathologic cirrhosis (RA wedge gradient during TJ bx was 3 mmHg and s/p liver bx 7/7 showing mild portal fibrosis; no cirrhosis). MR abd + MRCP 7/12/21 showing cirrhotic morphology w/o focal hepatic lesion, reviewed imaging with radiologist (MRI with notched contour and dilated portal vein but no other findings c/w portal HTN. Findings/impression discussed with pulmonary attending Dr. Lisker, who agreed that no other liver evaluation is required while she is inpatient.      Recommendations:  - patient to follow up in liver clinic in 2 weeks (will provide pt with liver clinic number [phone] prior to discharge)  - rest of care per primary team    We will sign off at this time. Please reconsult or page if questions.    Kailey Lewis MD PGY-4  Gastroenterology Fellow  Pager #[phone] ([facility]) or [phone] ([facility])  Available on [employer] Teams.  Please contact on-call GI fellow via answering service ([phone]) after 5pm and before 8am, and on weekends.    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 Hepatology consulted to assist with workup/evaluation of possible underlying cirrhosis in the setting of pending lung transplant evaluation.       #Mild portal fibrosis- FIB4- 1.96  H/o blood transfusion 1998. 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 CLD work up previously: HBsAg neg, HbcIgM neg, c Total neg, sAb neg, IgG, IgA, IgM wnl, RUPESH, anti-LKM, AMA negative, Hepatitis A neg, ferritin 169, AFP wnl, ASMA 1:40. Per MRCP from 04/2021, pt without stricturing or hepatic lesions. Workup for cirrhosis so far was notable for positive ASMA of 1:40, Child Maria Class A; MELD Na 9; FIB4- 1.96 which suggested unlikely advanced fibrosis, normal PLT, (She had positive anti_HCV but negative HCV PCR, no evidence of active or chronic HCV), A1AT was 163 (normal) this admission. Was never treated for chronic HCV. s/p transjugular liver bx by IR on 7/7 with 3 mmHg wedged - RA gradient; 2 core specimens were obtained. No evidence of physiologic or pathologic cirrhosis (RA wedge gradient during TJ bx was 3 mmHg and s/p liver bx 7/7 showing mild portal fibrosis; no cirrhosis). MR abd + MRCP 7/12/21 showing cirrhotic morphology w/o focal hepatic lesion, reviewed imaging with radiologist (MRI with notched contour and dilated portal vein but no other findings c/w portal HTN. Findings/impression discussed with pulmonary attending Dr. Lisker, who agreed that no other liver evaluation is required while she is inpatient.      Recommendations:  - patient to follow up per pulmonary team  - rest of care per primary team    We will sign off at this time. Please reconsult or page if questions.    Kailey Lewis MD PGY-4  Gastroenterology Fellow  Pager #[phone] ([facility]) or [phone] ([facility])  Available on [employer] Teams.  Please contact on-call GI fellow via answering service ([phone]) after 5pm and before 8am, and on weekends.

## 2021-07-14 NOTE — PROGRESS NOTE ADULT - ATTENDING COMMENTS
seen and examined with acp, agree with above  more sob today, 02 sats stable , increased use of the AVAPS for work of breathing, HCO3 stable  afebrile. no leukocytosis  bedside echo with normal RV  on hypersal, aerobika. does not tolerate vest  CT, Dopp

## 2021-07-14 NOTE — PROGRESS NOTE ADULT - ASSESSMENT
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 Patient now presented from Pulmonary Office, Dr. Young for worsening SOB, lethargy and cough with thick yellow secretions. Now readmitted for acute on chronic hypercarbic respiratory failure likely in setting of bronchiectasis exacerbation with recurrent PNA.     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   - CT CHEST 6/30 with (+) bronchiectasis   - SCx 6/30 with Pseudomonas Aeurginosa  - Continue on aggressive chest PT with Mucomyst, Duoneb and Chest Vest Q6H   - Continue on Symbicort BID and Singular QD   - s/p Prednisone 40mg QD (6/30 - 7/3)   - s/p Avycaz/ NELLY (6/30 - 7/2) and c/w Cefepime (7/2- until 7/14); Can dc on levaquin  - Hypercarbia improved and now continued on NC QD and AVAPs QHS.   - Will need home NIV set up prior to DC   - Has outpatient f/u appt with Dr. Young on 7/15 per Chart Review for possible Lung transplant  - Fever to 101 wbc increased to 18, CX sent , Scx + moderate gm+ cocci in pairs - Vanco added / afebrile overnight   - WBC trend down this am -await results of cx's  - Vanco level sub therapeutic and dose adjusted   - FU Sputum cx is normal prashanth Vanco dc   - Awaiting insurance approval for NIV at home     # Acute on Chronic Sinusitis   - CT SINUS 6/30 with extensive inflammatory changes throughout the paranasal sinuses and their respective drainage pathways, , improved in the right frontal and sphenoid sinuses since comparison. 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 Cannot rule out CSF leak. Thinning of the left carotid canal wall along the posterior left sphenoid sinus. Cannot rule out dehiscence.  - Continue on Flonase and [place] BID   - MRI Sinuses- No definite evidence of CSF leak.  - ENT following    # Nodular Liver with possible Cirrhosis ?   - During admission in April patient was found to have nodular contour of liver on US 4/6/2021. Upon further discussion with patient and Hepatology, she was noted to have history of blood transfusion in 1998 with (+) Hep C AB with negative Hep C RNA. She also reports history of CBD stricture in 1998 in [place], s/p PTC/PTBD.   - CLD work up previously noted with HBsAg negative, HBcIgM negative, Hep E IgG negative, Hep A IgM negative, RUPESH, Anti-LKM and AMA negative, Ferritin 169, AFP within normal limits, ASMA 1:40. MR/MRCP without stricturing or hepatic lesions. No ascites on all imaging and no history of HE. No known varices/ no EGD record on record.   - Echo - EF 73%, unremarkable   - Hepatology consulted, recs apreciated  - S/P liver bx 7/7; prelim results with evidence of hepatic fibrosis but no cirrhosis- f/u final results  - Liver bx with mild portal fibrosis   - Follow up in [facility]ology Fellow clinic.  - MRCP  done- 7/12 awaiting report     # IDDM2, A1C 8.9 (6/2021)   - At home on: Lantus 14U QHS and sliding scale  - In house on: Lantus 15U QHS and Lispro 8U + Moderate ISS in house while on steroids.   - Will trend FS: adjustments will be made after evaluating dinner time FS if warranted     # Ethics/ DISPO/ GOC  - FULL CODE and GOC discussed with Son, Michela ([phone]) who expresses wishes of DNI, however will discuss further for DNR. Family wishes for all medical treatment (including invasive and possible lung transplant) to be performed, however if her condition was to worsen they do not wish for intubation. GOC conversation ongoing.   - PT recommends home with home PT   - Awaiting approval for home NIV machine  - DC planning likely early next week pending approval for NIV machine and completion of IV abx. Will plan for J&J COVID19 vaccine on discharge date.    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 Patient now presented from Pulmonary Office, Dr. Young for worsening SOB, lethargy and cough with thick yellow secretions. Now readmitted for acute on chronic hypercarbic respiratory failure likely in setting of bronchiectasis exacerbation with recurrent PNA.     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   - CT CHEST 6/30 with (+) bronchiectasis   - SCx 6/30 with Pseudomonas Aeurginosa  - Continue on aggressive chest PT with Mucomyst, Duoneb and Chest Vest Q6H   - Continue on Symbicort BID and Singular QD   - s/p Prednisone 40mg QD (6/30 - 7/3)   - s/p Avycaz/ NELLY (6/30 - 7/2) and c/w Cefepime (7/2- until 7/14); Can dc on levaquin  - Hypercarbia improved and now continued on NC QD and AVAPs QHS.   - Will need home NIV set up prior to DC   - Has outpatient f/u appt with Dr. Young on 7/15 per Chart Review for possible Lung transplant  - Fever to 101 wbc increased to 18, CX sent , Scx + moderate gm+ cocci in pairs - Vanco added / afebrile overnight   - WBC trend down this am -await results of cx's  - Vanco level sub therapeutic and dose adjusted   - FU Sputum cx is normal prashanth Vanco dc   - Awaiting insurance approval for NIV at home     # Acute on Chronic Sinusitis   - CT SINUS 6/30 with extensive inflammatory changes throughout the paranasal sinuses and their respective drainage pathways, , improved in the right frontal and sphenoid sinuses since comparison. 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 Cannot rule out CSF leak. Thinning of the left carotid canal wall along the posterior left sphenoid sinus. Cannot rule out dehiscence.  - Continue on Flonase and [place] BID   - MRI Sinuses- No definite evidence of CSF leak.  - ENT following    # Nodular Liver with possible Cirrhosis ?   - During admission in April patient was found to have nodular contour of liver on US 4/6/2021. Upon further discussion with patient and Hepatology, she was noted to have history of blood transfusion in 1998 with (+) Hep C AB with negative Hep C RNA. She also reports history of CBD stricture in 1998 in [place], s/p PTC/PTBD.   - CLD work up previously noted with HBsAg negative, HBcIgM negative, Hep E IgG negative, Hep A IgM negative, RUPESH, Anti-LKM and AMA negative, Ferritin 169, AFP within normal limits, ASMA 1:40. MR/MRCP without stricturing or hepatic lesions. No ascites on all imaging and no history of HE. No known varices/ no EGD record on record.   - Echo - EF 73%, unremarkable   - Hepatology consulted, recs apreciated  - S/P liver bx 7/7; prelim results with evidence of hepatic fibrosis but no cirrhosis- f/u final results  - Liver bx with mild portal fibrosis   - Follow up in [facility] clinic.  - MRCP  done- 7/12 awaiting report     # IDDM2, A1C 8.9 (6/2021)   - At home on: Lantus 14U QHS and sliding scale  - In house on: Lantus 15U QHS and Lispro 8U + Moderate ISS in house while on steroids.   - Will trend FS: adjustments will be made after evaluating dinner time FS if warranted     # Ethics/ DISPO/ GOC  - FULL CODE and GOC discussed with Son, Michela ([phone]) who expresses wishes of DNI, however will discuss further for DNR. Family wishes for all medical treatment (including invasive and possible lung transplant) to be performed, however if her condition was to worsen they do not wish for intubation. GOC conversation ongoing.   - PT recommends home with home PT   - Awaiting approval for home NIV machine  - DC planning likely early next week pending approval for NIV machine and completion of IV abx. Will plan for J&J COVID19 vaccine on discharge date.    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 Patient now presented from Pulmonary Office, Dr. Young for worsening SOB, lethargy and cough with thick yellow secretions. Now readmitted for acute on chronic hypercarbic respiratory failure likely in setting of bronchiectasis exacerbation with recurrent PNA.     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   - CT CHEST 6/30 with (+) bronchiectasis   - SCx 6/30 with Pseudomonas Aeurginosa  - Continue on aggressive chest PT with Mucomyst, Duoneb and Chest Vest Q6H   - Continue on Symbicort BID and Singular QD   - s/p Prednisone 40mg QD (6/30 - 7/3)   - s/p Avycaz/ NELLY (6/30 - 7/2) and c/w Cefepime (7/2- until 7/14); Can dc on levaquin  - Hypercarbia improved and now continued on NC QD and AVAPs QHS.   - Will need home NIV set up prior to DC   - Has outpatient f/u appt with Dr. Young on 7/15 per Chart Review for possible Lung transplant  - Fever to 101 wbc increased to 18, CX sent , Scx + moderate gm+ cocci in pairs - Vanco added / afebrile overnight   - WBC trend down this am -await results of cx's  - Vanco level sub therapeutic and dose adjusted   - FU Sputum cx is normal prashanth Vanco dc   - Awaiting insurance approval for NIV at home     # Acute on Chronic Sinusitis   - CT SINUS 6/30 with extensive inflammatory changes throughout the paranasal sinuses and their respective drainage pathways, , improved in the right frontal and sphenoid sinuses since comparison. 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 Cannot rule out CSF leak. Thinning of the left carotid canal wall along the posterior left sphenoid sinus. Cannot rule out dehiscence.  - Continue on Flonase and [place] BID   - MRI Sinuses- No definite evidence of CSF leak.  - ENT following    # Nodular Liver with possible Cirrhosis ?   - During admission in April patient was found to have nodular contour of liver on US 4/6/2021. Upon further discussion with patient and Hepatology, she was noted to have history of blood transfusion in 1998 with (+) Hep C AB with negative Hep C RNA. She also reports history of CBD stricture in 1998 in [place], s/p PTC/PTBD.   - CLD work up previously noted with HBsAg negative, HBcIgM negative, Hep E IgG negative, Hep A IgM negative, RUPESH, Anti-LKM and AMA negative, Ferritin 169, AFP within normal limits, ASMA 1:40. MR/MRCP without stricturing or hepatic lesions. No ascites on all imaging and no history of HE. No known varices/ no EGD record on record.   - Echo - EF 73%, unremarkable   - Hepatology consulted, recs apreciated  - S/P liver bx 7/7; prelim results with evidence of hepatic fibrosis but no cirrhosis- f/u final results  - Liver bx with mild portal fibrosis   - Follow up in [facility]atology Fellow clinic.  - MRCP  done- showing Cirrhotic morphology with no focal hepatic lesion.    # IDDM2, A1C 8.9 (6/2021)   - At home on: Lantus 14U QHS and sliding scale  - In house on: Lantus 15U QHS and Lispro 8U + Moderate ISS in house while on steroids.   - Will trend FS: adjustments will be made after evaluating dinner time FS if warranted     # Ethics/ DISPO/ GOC  - FULL CODE and GOC discussed with SonMichela ([phone]) who expresses wishes of DNI, however will discuss further for DNR. Family wishes for all medical treatment (including invasive and possible lung transplant) to be performed, however if her condition was to worsen they do not wish for intubation. GOC conversation ongoing.   - PT recommends home with home PT   - Awaiting approval for home NIV machine  - Desatting to 80s while eating; requiring to be placed back on avaps. Will plan for J&J COVID19 vaccine on discharge date.    # DVT PPX -Lovenox sc

## 2021-07-14 NOTE — PROGRESS NOTE ADULT - SUBJECTIVE AND OBJECTIVE BOX
Chief Complaint:  Patient is a 66y old  Female who presents with a chief complaint of SOB (14 Jul 2021 07:50)      Interval Events:       Hospital Medications:  acetaminophen   Tablet .. 650 milliGRAM(s) Oral every 12 hours PRN  acetaminophen   Tablet .. 650 milliGRAM(s) Oral every 4 hours PRN  acetaminophen   Tablet .. 650 milliGRAM(s) Oral every 6 hours PRN  albuterol/ipratropium for Nebulization 3 milliLiter(s) Nebulizer every 6 hours  amLODIPine   Tablet 5 milliGRAM(s) Oral daily  ascorbic acid 500 milliGRAM(s) Oral daily  budesonide 160 MICROgram(s)/formoterol 4.5 MICROgram(s) Inhaler 2 Puff(s) Inhalation two times a day  chlorhexidine 4% Liquid 1 Application(s) Topical daily  dextrose 40% Gel 15 Gram(s) Oral once  dextrose 5%. 1000 milliLiter(s) IV Continuous <Continuous>  dextrose 5%. 1000 milliLiter(s) IV Continuous <Continuous>  dextrose 50% Injectable 25 Gram(s) IV Push once  dextrose 50% Injectable 12.5 Gram(s) IV Push once  dextrose 50% Injectable 25 Gram(s) IV Push once  enoxaparin Injectable 40 milliGRAM(s) SubCutaneous daily  famotidine    Tablet 40 milliGRAM(s) Oral at bedtime  ferrous    sulfate 325 milliGRAM(s) Oral daily  fluticasone propionate 50 MICROgram(s)/spray Nasal Spray 2 Spray(s) Both Nostrils two times a day  glucagon  Injectable 1 milliGRAM(s) IntraMuscular once  guaiFENesin Oral Liquid (Sugar-Free) 100 milliGRAM(s) Oral every 6 hours PRN  insulin glargine Injectable (LANTUS) 15 Unit(s) SubCutaneous at bedtime  insulin lispro (ADMELOG) corrective regimen sliding scale   SubCutaneous three times a day before meals  insulin lispro (ADMELOG) corrective regimen sliding scale   SubCutaneous at bedtime  insulin lispro Injectable (ADMELOG) 8 Unit(s) SubCutaneous three times a day before meals  mirtazapine 7.5 milliGRAM(s) Oral at bedtime  montelukast 10 milliGRAM(s) Oral daily  pantoprazole    Tablet 40 milliGRAM(s) Oral before breakfast  predniSONE   Tablet 40 milliGRAM(s) Oral daily  senna 2 Tablet(s) Oral at bedtime  simethicone 80 milliGRAM(s) Chew three times a day PRN  sodium chloride 0.65% Nasal 2 Spray(s) Both Nostrils every 12 hours  sodium chloride 3%  Inhalation 3 milliLiter(s) Inhalation every 6 hours  witch hazel Pads 1 Application(s) Topical three times a day      PMHX/PSHX:  DM (diabetes mellitus)    Bronchitis    ABPA (allergic bronchopulmonary aspergillosis)    Bronchiectasis    Asthma    Hypertension    Vitamin D deficiency    Microcytic anemia    GERD (gastroesophageal reflux disease)    Postnasal drip    Pseudomonas aeruginosa colonization    Allergic rhinitis    Recurrent pneumonia    Type 2 diabetes mellitus, with long-term current use of insulin    History of cholecystectomy      ROS: 10 point ROS negative unless otherwise stated in subjective      PHYSICAL EXAM:     GENERAL:  Well developed, no distress  HEENT:  NC/AT,  conjunctivae clear, sclera anicteric  CHEST:  +diffusely rhonchorus breath sounds; +diffuse wheezing  HEART:  Regular rhythm & rate  ABDOMEN:  Soft, non-tender, non-distended; +BS  EXTREMITIES:  no LE  edema  SKIN:  No rash/erythema/ecchymoses/petechiae/wounds/jaundice  NEURO:  Alert, oriented      Vital Signs:  Vital Signs Last 24 Hrs  T(C): 36.4 (14 Jul 2021 06:31), Max: 36.4 (13 Jul 2021 21:53)  T(F): 97.5 (14 Jul 2021 06:31), Max: 97.6 (13 Jul 2021 21:53)  HR: 94 (14 Jul 2021 10:50) (79 - 101)  BP: 135/70 (14 Jul 2021 06:31) (132/68 - 135/70)  BP(mean): --  RR: 20 (14 Jul 2021 10:00) (17 - 20)  SpO2: 99% (14 Jul 2021 10:50) (88% - 99%)  Daily     Daily     LABS:                        11.0   5.91  )-----------( 184      ( 14 Jul 2021 06:16 )             36.3     07-14    138  |  98  |  18  ----------------------------<  217<H>  4.8   |  33<H>  |  0.37<L>    Ca    9.8      14 Jul 2021 06:16  Phos  3.9     07-14  Mg     2.00     07-14    TPro  7.2  /  Alb  3.3  /  TBili  <0.2  /  DBili  x   /  AST  51<H>  /  ALT  57<H>  /  AlkPhos  263<H>  07-14    LIVER FUNCTIONS - ( 14 Jul 2021 06:16 )  Alb: 3.3 g/dL / Pro: 7.2 g/dL / ALK PHOS: 263 U/L / ALT: 57 U/L / AST: 51 U/L / GGT: x                   Imaging:      FINDINGS:  LOWER CHEST: Within normal limits.    LIVER: Cirrhotic morphology. No steatosis. No focal lesion.  BILE DUCTS: Prominence of the central intrahepatic ducts and common bile duct with pneumobilia, probably related to postcholecystectomy state.  GALLBLADDER:Cholecystectomy.  SPLEEN: Within normal limits.  PANCREAS: Within normal limits.  ADRENALS: Within normal limits.  KIDNEYS/URETERS: Within normal limits.    VISUALIZED PORTIONS:  BOWEL: Within normal limits.  PERITONEUM: No ascites.  VESSELS: Celiac axis, SMA and MELISA are patent. Main portal vein, SMV and splenic vein are patent. Hepatic veins are patent.  RETROPERITONEUM/LYMPH NODES: No lymphadenopathy.  ABDOMINAL WALL: Within normal limits.  BONES: Within normal limits.    IMPRESSION:    Cirrhotic morphology with no focal hepatic lesion.         Chief Complaint:  Patient is a 66y old  Female who presents with a chief complaint of SOB (14 Jul 2021 07:50)      Interval Events: Unable to speak to pt directly as she was in CT. Spoke with pt's family who was at bedside      Hospital Medications:  acetaminophen   Tablet .. 650 milliGRAM(s) Oral every 12 hours PRN  acetaminophen   Tablet .. 650 milliGRAM(s) Oral every 4 hours PRN  acetaminophen   Tablet .. 650 milliGRAM(s) Oral every 6 hours PRN  albuterol/ipratropium for Nebulization 3 milliLiter(s) Nebulizer every 6 hours  amLODIPine   Tablet 5 milliGRAM(s) Oral daily  ascorbic acid 500 milliGRAM(s) Oral daily  budesonide 160 MICROgram(s)/formoterol 4.5 MICROgram(s) Inhaler 2 Puff(s) Inhalation two times a day  chlorhexidine 4% Liquid 1 Application(s) Topical daily  dextrose 40% Gel 15 Gram(s) Oral once  dextrose 5%. 1000 milliLiter(s) IV Continuous <Continuous>  dextrose 5%. 1000 milliLiter(s) IV Continuous <Continuous>  dextrose 50% Injectable 25 Gram(s) IV Push once  dextrose 50% Injectable 12.5 Gram(s) IV Push once  dextrose 50% Injectable 25 Gram(s) IV Push once  enoxaparin Injectable 40 milliGRAM(s) SubCutaneous daily  famotidine    Tablet 40 milliGRAM(s) Oral at bedtime  ferrous    sulfate 325 milliGRAM(s) Oral daily  fluticasone propionate 50 MICROgram(s)/spray Nasal Spray 2 Spray(s) Both Nostrils two times a day  glucagon  Injectable 1 milliGRAM(s) IntraMuscular once  guaiFENesin Oral Liquid (Sugar-Free) 100 milliGRAM(s) Oral every 6 hours PRN  insulin glargine Injectable (LANTUS) 15 Unit(s) SubCutaneous at bedtime  insulin lispro (ADMELOG) corrective regimen sliding scale   SubCutaneous three times a day before meals  insulin lispro (ADMELOG) corrective regimen sliding scale   SubCutaneous at bedtime  insulin lispro Injectable (ADMELOG) 8 Unit(s) SubCutaneous three times a day before meals  mirtazapine 7.5 milliGRAM(s) Oral at bedtime  montelukast 10 milliGRAM(s) Oral daily  pantoprazole    Tablet 40 milliGRAM(s) Oral before breakfast  predniSONE   Tablet 40 milliGRAM(s) Oral daily  senna 2 Tablet(s) Oral at bedtime  simethicone 80 milliGRAM(s) Chew three times a day PRN  sodium chloride 0.65% Nasal 2 Spray(s) Both Nostrils every 12 hours  sodium chloride 3%  Inhalation 3 milliLiter(s) Inhalation every 6 hours  witch hazel Pads 1 Application(s) Topical three times a day      PMHX/PSHX:  DM (diabetes mellitus)    Bronchitis    ABPA (allergic bronchopulmonary aspergillosis)    Bronchiectasis    Asthma    Hypertension    Vitamin D deficiency    Microcytic anemia    GERD (gastroesophageal reflux disease)    Postnasal drip    Pseudomonas aeruginosa colonization    Allergic rhinitis    Recurrent pneumonia    Type 2 diabetes mellitus, with long-term current use of insulin    History of cholecystectomy      ROS: 10 point ROS negative unless otherwise stated in subjective      PHYSICAL EXAM: Unable to exam as pt was in CT    Vital Signs:  Vital Signs Last 24 Hrs  T(C): 36.4 (14 Jul 2021 06:31), Max: 36.4 (13 Jul 2021 21:53)  T(F): 97.5 (14 Jul 2021 06:31), Max: 97.6 (13 Jul 2021 21:53)  HR: 94 (14 Jul 2021 10:50) (79 - 101)  BP: 135/70 (14 Jul 2021 06:31) (132/68 - 135/70)  BP(mean): --  RR: 20 (14 Jul 2021 10:00) (17 - 20)  SpO2: 99% (14 Jul 2021 10:50) (88% - 99%)  Daily     Daily     LABS:                        11.0   5.91  )-----------( 184      ( 14 Jul 2021 06:16 )             36.3     07-14    138  |  98  |  18  ----------------------------<  217<H>  4.8   |  33<H>  |  0.37<L>    Ca    9.8      14 Jul 2021 06:16  Phos  3.9     07-14  Mg     2.00     07-14    TPro  7.2  /  Alb  3.3  /  TBili  <0.2  /  DBili  x   /  AST  51<H>  /  ALT  57<H>  /  AlkPhos  263<H>  07-14    LIVER FUNCTIONS - ( 14 Jul 2021 06:16 )  Alb: 3.3 g/dL / Pro: 7.2 g/dL / ALK PHOS: 263 U/L / ALT: 57 U/L / AST: 51 U/L / GGT: x           Imaging:    MRI abd/MRCP 7/12:    FINDINGS:  LOWER CHEST: Within normal limits.    LIVER: Cirrhotic morphology. No steatosis. No focal lesion.  BILE DUCTS: Prominence of the central intrahepatic ducts and common bile duct with pneumobilia, probably related to postcholecystectomy state.  GALLBLADDER:Cholecystectomy.  SPLEEN: Within normal limits.  PANCREAS: Within normal limits.  ADRENALS: Within normal limits.  KIDNEYS/URETERS: Within normal limits.    VISUALIZED PORTIONS:  BOWEL: Within normal limits.  PERITONEUM: No ascites.  VESSELS: Celiac axis, SMA and MELISA are patent. Main portal vein, SMV and splenic vein are patent. Hepatic veins are patent.  RETROPERITONEUM/LYMPH NODES: No lymphadenopathy.  ABDOMINAL WALL: Within normal limits.  BONES: Within normal limits.    IMPRESSION:    Cirrhotic morphology with no focal hepatic lesion.         Chief Complaint:  Patient is a 66y old  Female who presents with a chief complaint of SOB (14 Jul 2021 07:50)      Interval Events: Unable to speak to pt directly as she was in CT. Spoke with pt's family who was at bedside and pulmonary attending regarding workup.      Hospital Medications:  acetaminophen   Tablet .. 650 milliGRAM(s) Oral every 12 hours PRN  acetaminophen   Tablet .. 650 milliGRAM(s) Oral every 4 hours PRN  acetaminophen   Tablet .. 650 milliGRAM(s) Oral every 6 hours PRN  albuterol/ipratropium for Nebulization 3 milliLiter(s) Nebulizer every 6 hours  amLODIPine   Tablet 5 milliGRAM(s) Oral daily  ascorbic acid 500 milliGRAM(s) Oral daily  budesonide 160 MICROgram(s)/formoterol 4.5 MICROgram(s) Inhaler 2 Puff(s) Inhalation two times a day  chlorhexidine 4% Liquid 1 Application(s) Topical daily  dextrose 40% Gel 15 Gram(s) Oral once  dextrose 5%. 1000 milliLiter(s) IV Continuous <Continuous>  dextrose 5%. 1000 milliLiter(s) IV Continuous <Continuous>  dextrose 50% Injectable 25 Gram(s) IV Push once  dextrose 50% Injectable 12.5 Gram(s) IV Push once  dextrose 50% Injectable 25 Gram(s) IV Push once  enoxaparin Injectable 40 milliGRAM(s) SubCutaneous daily  famotidine    Tablet 40 milliGRAM(s) Oral at bedtime  ferrous    sulfate 325 milliGRAM(s) Oral daily  fluticasone propionate 50 MICROgram(s)/spray Nasal Spray 2 Spray(s) Both Nostrils two times a day  glucagon  Injectable 1 milliGRAM(s) IntraMuscular once  guaiFENesin Oral Liquid (Sugar-Free) 100 milliGRAM(s) Oral every 6 hours PRN  insulin glargine Injectable (LANTUS) 15 Unit(s) SubCutaneous at bedtime  insulin lispro (ADMELOG) corrective regimen sliding scale   SubCutaneous three times a day before meals  insulin lispro (ADMELOG) corrective regimen sliding scale   SubCutaneous at bedtime  insulin lispro Injectable (ADMELOG) 8 Unit(s) SubCutaneous three times a day before meals  mirtazapine 7.5 milliGRAM(s) Oral at bedtime  montelukast 10 milliGRAM(s) Oral daily  pantoprazole    Tablet 40 milliGRAM(s) Oral before breakfast  predniSONE   Tablet 40 milliGRAM(s) Oral daily  senna 2 Tablet(s) Oral at bedtime  simethicone 80 milliGRAM(s) Chew three times a day PRN  sodium chloride 0.65% Nasal 2 Spray(s) Both Nostrils every 12 hours  sodium chloride 3%  Inhalation 3 milliLiter(s) Inhalation every 6 hours  witch hazel Pads 1 Application(s) Topical three times a day      PMHX/PSHX:  DM (diabetes mellitus)    Bronchitis    ABPA (allergic bronchopulmonary aspergillosis)    Bronchiectasis    Asthma    Hypertension    Vitamin D deficiency    Microcytic anemia    GERD (gastroesophageal reflux disease)    Postnasal drip    Pseudomonas aeruginosa colonization    Allergic rhinitis    Recurrent pneumonia    Type 2 diabetes mellitus, with long-term current use of insulin    History of cholecystectomy      ROS: 10 point ROS negative unless otherwise stated in subjective      PHYSICAL EXAM: Unable to exam as pt was in CT    Vital Signs:  Vital Signs Last 24 Hrs  T(C): 36.4 (14 Jul 2021 06:31), Max: 36.4 (13 Jul 2021 21:53)  T(F): 97.5 (14 Jul 2021 06:31), Max: 97.6 (13 Jul 2021 21:53)  HR: 94 (14 Jul 2021 10:50) (79 - 101)  BP: 135/70 (14 Jul 2021 06:31) (132/68 - 135/70)  BP(mean): --  RR: 20 (14 Jul 2021 10:00) (17 - 20)  SpO2: 99% (14 Jul 2021 10:50) (88% - 99%)  Daily     Daily     LABS:                        11.0   5.91  )-----------( 184      ( 14 Jul 2021 06:16 )             36.3     07-14    138  |  98  |  18  ----------------------------<  217<H>  4.8   |  33<H>  |  0.37<L>    Ca    9.8      14 Jul 2021 06:16  Phos  3.9     07-14  Mg     2.00     07-14    TPro  7.2  /  Alb  3.3  /  TBili  <0.2  /  DBili  x   /  AST  51<H>  /  ALT  57<H>  /  AlkPhos  263<H>  07-14    LIVER FUNCTIONS - ( 14 Jul 2021 06:16 )  Alb: 3.3 g/dL / Pro: 7.2 g/dL / ALK PHOS: 263 U/L / ALT: 57 U/L / AST: 51 U/L / GGT: x           Imaging:    MRI abd/MRCP 7/12:    FINDINGS:  LOWER CHEST: Within normal limits.    LIVER: Cirrhotic morphology. No steatosis. No focal lesion.  BILE DUCTS: Prominence of the central intrahepatic ducts and common bile duct with pneumobilia, probably related to postcholecystectomy state.  GALLBLADDER:Cholecystectomy.  SPLEEN: Within normal limits.  PANCREAS: Within normal limits.  ADRENALS: Within normal limits.  KIDNEYS/URETERS: Within normal limits.    VISUALIZED PORTIONS:  BOWEL: Within normal limits.  PERITONEUM: No ascites.  VESSELS: Celiac axis, SMA and MELISA are patent. Main portal vein, SMV and splenic vein are patent. Hepatic veins are patent.  RETROPERITONEUM/LYMPH NODES: No lymphadenopathy.  ABDOMINAL WALL: Within normal limits.  BONES: Within normal limits.    IMPRESSION:    Cirrhotic morphology with no focal hepatic lesion.

## 2021-07-14 NOTE — PROCEDURE NOTE - ADDITIONAL PROCEDURE DETAILS
Patient requiring PIV access for medical management. Prior to procedure, patient was properly identified using name and . Site prepped using chlorhexidine and tourniquet applied. Ultrasound was used to identify a LUE, easily compressible, no thrombus, non-pulsatile. A  20G x 6cm Arrow Extended Dwelling Angiocath was introduced into the skin and inserted into the identified vessel. Flash seen in needle housing and placement confirmed on US. Catheter advanced, needle withdrawn, and placement again confirmed w/ US visualization. Clave with saline flush attached and placement again confirmed with positive blood return/ flash. IV flushes easily without resistance, and no swelling appreciated at insertion site. Site cleaned with alcohol, Cavilon skin prep applied, and cath secured with central line dressing. Patient tolerated procedure well with no complications and no blood loss. Nursing staff informed and dressing labeled with initials, date and time. Dressing to be changed Qweekly.

## 2021-07-14 NOTE — PROGRESS NOTE ADULT - SUBJECTIVE AND OBJECTIVE BOX
CHIEF COMPLAINT: Patient is a 66y old  Female who presents with a chief complaint of SOB.    Interval Events:    REVIEW OF SYSTEMS:  [ ] All other systems negative  [ ] Unable to assess ROS because ________    OBJECTIVE:  ICU Vital Signs Last 24 Hrs  T(C): 36.4 (14 Jul 2021 06:31), Max: 36.4 (13 Jul 2021 21:53)  T(F): 97.5 (14 Jul 2021 06:31), Max: 97.6 (13 Jul 2021 21:53)  HR: 88 (14 Jul 2021 06:39) (72 - 101)  BP: 135/70 (14 Jul 2021 06:31) (132/68 - 141/71)  RR: 17 (14 Jul 2021 06:31) (16 - 17)  SpO2: 90% (14 Jul 2021 06:39) (90% - 98%)    Mode: standby    CAPILLARY BLOOD GLUCOSE      POCT Blood Glucose.: 193 mg/dL (14 Jul 2021 07:38)      HOSPITAL MEDICATIONS:  MEDICATIONS  (STANDING):  albuterol/ipratropium for Nebulization 3 milliLiter(s) Nebulizer every 6 hours  amLODIPine   Tablet 5 milliGRAM(s) Oral daily  ascorbic acid 500 milliGRAM(s) Oral daily  budesonide 160 MICROgram(s)/formoterol 4.5 MICROgram(s) Inhaler 2 Puff(s) Inhalation two times a day  chlorhexidine 4% Liquid 1 Application(s) Topical daily  dextrose 40% Gel 15 Gram(s) Oral once  dextrose 5%. 1000 milliLiter(s) (50 mL/Hr) IV Continuous <Continuous>  dextrose 5%. 1000 milliLiter(s) (100 mL/Hr) IV Continuous <Continuous>  dextrose 50% Injectable 25 Gram(s) IV Push once  dextrose 50% Injectable 12.5 Gram(s) IV Push once  dextrose 50% Injectable 25 Gram(s) IV Push once  enoxaparin Injectable 40 milliGRAM(s) SubCutaneous daily  famotidine    Tablet 40 milliGRAM(s) Oral at bedtime  ferrous    sulfate 325 milliGRAM(s) Oral daily  fluticasone propionate 50 MICROgram(s)/spray Nasal Spray 2 Spray(s) Both Nostrils two times a day  glucagon  Injectable 1 milliGRAM(s) IntraMuscular once  insulin glargine Injectable (LANTUS) 15 Unit(s) SubCutaneous at bedtime  insulin lispro (ADMELOG) corrective regimen sliding scale   SubCutaneous three times a day before meals  insulin lispro (ADMELOG) corrective regimen sliding scale   SubCutaneous at bedtime  insulin lispro Injectable (ADMELOG) 8 Unit(s) SubCutaneous three times a day before meals  mirtazapine 7.5 milliGRAM(s) Oral at bedtime  montelukast 10 milliGRAM(s) Oral daily  pantoprazole    Tablet 40 milliGRAM(s) Oral before breakfast  predniSONE   Tablet 40 milliGRAM(s) Oral daily  senna 2 Tablet(s) Oral at bedtime  sodium chloride 0.65% Nasal 2 Spray(s) Both Nostrils every 12 hours  sodium chloride 3%  Inhalation 3 milliLiter(s) Inhalation every 6 hours  witch hazel Pads 1 Application(s) Topical three times a day    MEDICATIONS  (PRN):  acetaminophen   Tablet .. 650 milliGRAM(s) Oral every 6 hours PRN Temp greater or equal to 38C (100.4F)  acetaminophen   Tablet .. 650 milliGRAM(s) Oral every 12 hours PRN Mild Pain (1 - 3), Moderate Pain (4 - 6)  acetaminophen   Tablet .. 650 milliGRAM(s) Oral every 4 hours PRN Mild Pain (1 - 3)  guaiFENesin Oral Liquid (Sugar-Free) 100 milliGRAM(s) Oral every 6 hours PRN Cough  simethicone 80 milliGRAM(s) Chew three times a day PRN Gas      LABS:                        11.0   5.91  )-----------( 184      ( 14 Jul 2021 06:16 )             36.3     07-14    138  |  98  |  18  ----------------------------<  217<H>  4.8   |  33<H>  |  0.37<L>    Ca    9.8      14 Jul 2021 06:16  Phos  3.9     07-14  Mg     2.00     07-14    TPro  7.2  /  Alb  3.3  /  TBili  <0.2  /  DBili  x   /  AST  51<H>  /  ALT  57<H>  /  AlkPhos  263<H>  07-14      MICROBIOLOGY:     RADIOLOGY:  [ ] Reviewed and interpreted by me    PULMONARY FUNCTION TESTS:    EKG: CHIEF COMPLAINT: Patient is a 66y old  Female who presents with a chief complaint of SOB.    Interval Events: No interval events noted overnight.    REVIEW OF SYSTEMS:  Complaining of SOB. Denies chest pain, abd pain, N/V.  [x] All other systems negative      OBJECTIVE:  ICU Vital Signs Last 24 Hrs  T(C): 36.4 (14 Jul 2021 06:31), Max: 36.4 (13 Jul 2021 21:53)  T(F): 97.5 (14 Jul 2021 06:31), Max: 97.6 (13 Jul 2021 21:53)  HR: 88 (14 Jul 2021 06:39) (72 - 101)  BP: 135/70 (14 Jul 2021 06:31) (132/68 - 141/71)  RR: 17 (14 Jul 2021 06:31) (16 - 17)  SpO2: 90% (14 Jul 2021 06:39) (90% - 98%)    Mode: standby    CAPILLARY BLOOD GLUCOSE      POCT Blood Glucose.: 193 mg/dL (14 Jul 2021 07:38)      HOSPITAL MEDICATIONS:  MEDICATIONS  (STANDING):  albuterol/ipratropium for Nebulization 3 milliLiter(s) Nebulizer every 6 hours  amLODIPine   Tablet 5 milliGRAM(s) Oral daily  ascorbic acid 500 milliGRAM(s) Oral daily  budesonide 160 MICROgram(s)/formoterol 4.5 MICROgram(s) Inhaler 2 Puff(s) Inhalation two times a day  chlorhexidine 4% Liquid 1 Application(s) Topical daily  dextrose 40% Gel 15 Gram(s) Oral once  dextrose 5%. 1000 milliLiter(s) (50 mL/Hr) IV Continuous <Continuous>  dextrose 5%. 1000 milliLiter(s) (100 mL/Hr) IV Continuous <Continuous>  dextrose 50% Injectable 25 Gram(s) IV Push once  dextrose 50% Injectable 12.5 Gram(s) IV Push once  dextrose 50% Injectable 25 Gram(s) IV Push once  enoxaparin Injectable 40 milliGRAM(s) SubCutaneous daily  famotidine    Tablet 40 milliGRAM(s) Oral at bedtime  ferrous    sulfate 325 milliGRAM(s) Oral daily  fluticasone propionate 50 MICROgram(s)/spray Nasal Spray 2 Spray(s) Both Nostrils two times a day  glucagon  Injectable 1 milliGRAM(s) IntraMuscular once  insulin glargine Injectable (LANTUS) 15 Unit(s) SubCutaneous at bedtime  insulin lispro (ADMELOG) corrective regimen sliding scale   SubCutaneous three times a day before meals  insulin lispro (ADMELOG) corrective regimen sliding scale   SubCutaneous at bedtime  insulin lispro Injectable (ADMELOG) 8 Unit(s) SubCutaneous three times a day before meals  mirtazapine 7.5 milliGRAM(s) Oral at bedtime  montelukast 10 milliGRAM(s) Oral daily  pantoprazole    Tablet 40 milliGRAM(s) Oral before breakfast  predniSONE   Tablet 40 milliGRAM(s) Oral daily  senna 2 Tablet(s) Oral at bedtime  sodium chloride 0.65% Nasal 2 Spray(s) Both Nostrils every 12 hours  sodium chloride 3%  Inhalation 3 milliLiter(s) Inhalation every 6 hours  witch hazel Pads 1 Application(s) Topical three times a day    MEDICATIONS  (PRN):  acetaminophen   Tablet .. 650 milliGRAM(s) Oral every 6 hours PRN Temp greater or equal to 38C (100.4F)  acetaminophen   Tablet .. 650 milliGRAM(s) Oral every 12 hours PRN Mild Pain (1 - 3), Moderate Pain (4 - 6)  acetaminophen   Tablet .. 650 milliGRAM(s) Oral every 4 hours PRN Mild Pain (1 - 3)  guaiFENesin Oral Liquid (Sugar-Free) 100 milliGRAM(s) Oral every 6 hours PRN Cough  simethicone 80 milliGRAM(s) Chew three times a day PRN Gas      LABS:                        11.0   5.91  )-----------( 184      ( 14 Jul 2021 06:16 )             36.3     07-14    138  |  98  |  18  ----------------------------<  217<H>  4.8   |  33<H>  |  0.37<L>    Ca    9.8      14 Jul 2021 06:16  Phos  3.9     07-14  Mg     2.00     07-14    TPro  7.2  /  Alb  3.3  /  TBili  <0.2  /  DBili  x   /  AST  51<H>  /  ALT  57<H>  /  AlkPhos  263<H>  07-14      MICROBIOLOGY:     RADIOLOGY:  [ ] Reviewed and interpreted by me    PULMONARY FUNCTION TESTS:    EKG:

## 2021-07-14 NOTE — PROVIDER CONTACT NOTE (CHANGE IN STATUS NOTIFICATION) - ASSESSMENT
pt currently on 4 L NC with with desaturation to 87-88%. increased work of breathing noted. congested lung sounds ausculated.

## 2021-07-14 NOTE — PROVIDER CONTACT NOTE (CHANGE IN STATUS NOTIFICATION) - ACTION/TREATMENT ORDERED:
discussed with team, keep patient NPO for now and send for CT chest.   signed out to primary RNNikki

## 2021-07-15 ENCOUNTER — APPOINTMENT (OUTPATIENT)
Dept: PULMONOLOGY | Facility: CLINIC | Age: 66
End: 2021-07-15

## 2021-07-15 LAB
ALBUMIN SERPL ELPH-MCNC: 3.3 G/DL — SIGNIFICANT CHANGE UP (ref 3.3–5)
ALP SERPL-CCNC: 229 U/L — HIGH (ref 40–120)
ALT FLD-CCNC: 44 U/L — HIGH (ref 4–33)
ANION GAP SERPL CALC-SCNC: 9 MMOL/L — SIGNIFICANT CHANGE UP (ref 7–14)
AST SERPL-CCNC: 37 U/L — HIGH (ref 4–32)
BILIRUB SERPL-MCNC: <0.2 MG/DL — SIGNIFICANT CHANGE UP (ref 0.2–1.2)
BUN SERPL-MCNC: 25 MG/DL — HIGH (ref 7–23)
CALCIUM SERPL-MCNC: 9.7 MG/DL — SIGNIFICANT CHANGE UP (ref 8.4–10.5)
CHLORIDE SERPL-SCNC: 99 MMOL/L — SIGNIFICANT CHANGE UP (ref 98–107)
CO2 SERPL-SCNC: 33 MMOL/L — HIGH (ref 22–31)
CREAT SERPL-MCNC: 0.48 MG/DL — LOW (ref 0.5–1.3)
CULTURE RESULTS: SIGNIFICANT CHANGE UP
CULTURE RESULTS: SIGNIFICANT CHANGE UP
GLUCOSE BLDC GLUCOMTR-MCNC: 113 MG/DL — HIGH (ref 70–99)
GLUCOSE BLDC GLUCOMTR-MCNC: 186 MG/DL — HIGH (ref 70–99)
GLUCOSE BLDC GLUCOMTR-MCNC: 235 MG/DL — HIGH (ref 70–99)
GLUCOSE BLDC GLUCOMTR-MCNC: 322 MG/DL — HIGH (ref 70–99)
GLUCOSE SERPL-MCNC: 119 MG/DL — HIGH (ref 70–99)
HCT VFR BLD CALC: 34.6 % — SIGNIFICANT CHANGE UP (ref 34.5–45)
HGB BLD-MCNC: 10.4 G/DL — LOW (ref 11.5–15.5)
MAGNESIUM SERPL-MCNC: 1.9 MG/DL — SIGNIFICANT CHANGE UP (ref 1.6–2.6)
MCHC RBC-ENTMCNC: 27.3 PG — SIGNIFICANT CHANGE UP (ref 27–34)
MCHC RBC-ENTMCNC: 30.1 GM/DL — LOW (ref 32–36)
MCV RBC AUTO: 90.8 FL — SIGNIFICANT CHANGE UP (ref 80–100)
NRBC # BLD: 0 /100 WBCS — SIGNIFICANT CHANGE UP
NRBC # FLD: 0 K/UL — SIGNIFICANT CHANGE UP
PLATELET # BLD AUTO: 188 K/UL — SIGNIFICANT CHANGE UP (ref 150–400)
POTASSIUM SERPL-MCNC: 3.9 MMOL/L — SIGNIFICANT CHANGE UP (ref 3.5–5.3)
POTASSIUM SERPL-SCNC: 3.9 MMOL/L — SIGNIFICANT CHANGE UP (ref 3.5–5.3)
PROT SERPL-MCNC: 6.7 G/DL — SIGNIFICANT CHANGE UP (ref 6–8.3)
RBC # BLD: 3.81 M/UL — SIGNIFICANT CHANGE UP (ref 3.8–5.2)
RBC # FLD: 13.7 % — SIGNIFICANT CHANGE UP (ref 10.3–14.5)
SODIUM SERPL-SCNC: 141 MMOL/L — SIGNIFICANT CHANGE UP (ref 135–145)
SPECIMEN SOURCE: SIGNIFICANT CHANGE UP
SPECIMEN SOURCE: SIGNIFICANT CHANGE UP
WBC # BLD: 8.59 K/UL — SIGNIFICANT CHANGE UP (ref 3.8–10.5)
WBC # FLD AUTO: 8.59 K/UL — SIGNIFICANT CHANGE UP (ref 3.8–10.5)

## 2021-07-15 PROCEDURE — 99233 SBSQ HOSP IP/OBS HIGH 50: CPT | Mod: GC

## 2021-07-15 RX ADMIN — FAMOTIDINE 40 MILLIGRAM(S): 10 INJECTION INTRAVENOUS at 22:15

## 2021-07-15 RX ADMIN — INSULIN GLARGINE 15 UNIT(S): 100 INJECTION, SOLUTION SUBCUTANEOUS at 22:15

## 2021-07-15 RX ADMIN — SODIUM CHLORIDE 3 MILLILITER(S): 9 INJECTION INTRAMUSCULAR; INTRAVENOUS; SUBCUTANEOUS at 15:27

## 2021-07-15 RX ADMIN — Medication 2 SPRAY(S): at 06:31

## 2021-07-15 RX ADMIN — Medication 8 UNIT(S): at 11:23

## 2021-07-15 RX ADMIN — Medication 500 MILLIGRAM(S): at 11:28

## 2021-07-15 RX ADMIN — Medication 4: at 17:43

## 2021-07-15 RX ADMIN — BUDESONIDE AND FORMOTEROL FUMARATE DIHYDRATE 2 PUFF(S): 160; 4.5 AEROSOL RESPIRATORY (INHALATION) at 22:45

## 2021-07-15 RX ADMIN — Medication 2 SPRAY(S): at 17:47

## 2021-07-15 RX ADMIN — BUDESONIDE AND FORMOTEROL FUMARATE DIHYDRATE 2 PUFF(S): 160; 4.5 AEROSOL RESPIRATORY (INHALATION) at 10:16

## 2021-07-15 RX ADMIN — Medication 2 SPRAY(S): at 17:45

## 2021-07-15 RX ADMIN — SODIUM CHLORIDE 3 MILLILITER(S): 9 INJECTION INTRAMUSCULAR; INTRAVENOUS; SUBCUTANEOUS at 03:40

## 2021-07-15 RX ADMIN — MONTELUKAST 10 MILLIGRAM(S): 4 TABLET, CHEWABLE ORAL at 11:29

## 2021-07-15 RX ADMIN — Medication 3 MILLILITER(S): at 09:52

## 2021-07-15 RX ADMIN — Medication 40 MILLIGRAM(S): at 06:35

## 2021-07-15 RX ADMIN — SENNA PLUS 2 TABLET(S): 8.6 TABLET ORAL at 22:14

## 2021-07-15 RX ADMIN — MIRTAZAPINE 7.5 MILLIGRAM(S): 45 TABLET, ORALLY DISINTEGRATING ORAL at 22:14

## 2021-07-15 RX ADMIN — AMLODIPINE BESYLATE 5 MILLIGRAM(S): 2.5 TABLET ORAL at 06:30

## 2021-07-15 RX ADMIN — PANTOPRAZOLE SODIUM 40 MILLIGRAM(S): 20 TABLET, DELAYED RELEASE ORAL at 06:30

## 2021-07-15 RX ADMIN — Medication 3 MILLILITER(S): at 15:27

## 2021-07-15 RX ADMIN — Medication 8: at 11:23

## 2021-07-15 RX ADMIN — SODIUM CHLORIDE 3 MILLILITER(S): 9 INJECTION INTRAMUSCULAR; INTRAVENOUS; SUBCUTANEOUS at 09:52

## 2021-07-15 RX ADMIN — Medication 3 MILLILITER(S): at 03:37

## 2021-07-15 RX ADMIN — SODIUM CHLORIDE 3 MILLILITER(S): 9 INJECTION INTRAMUSCULAR; INTRAVENOUS; SUBCUTANEOUS at 22:45

## 2021-07-15 RX ADMIN — Medication 3 MILLILITER(S): at 22:44

## 2021-07-15 RX ADMIN — ENOXAPARIN SODIUM 40 MILLIGRAM(S): 100 INJECTION SUBCUTANEOUS at 11:29

## 2021-07-15 RX ADMIN — Medication 325 MILLIGRAM(S): at 11:29

## 2021-07-15 RX ADMIN — CHLORHEXIDINE GLUCONATE 1 APPLICATION(S): 213 SOLUTION TOPICAL at 11:28

## 2021-07-15 RX ADMIN — Medication 8 UNIT(S): at 17:44

## 2021-07-15 NOTE — PROGRESS NOTE ADULT - ASSESSMENT
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 Patient now presented from Pulmonary Office, Dr. Young for worsening SOB, lethargy and cough with thick yellow secretions. Now readmitted for acute on chronic hypercarbic respiratory failure likely in setting of bronchiectasis exacerbation with recurrent PNA.     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   - CT CHEST 6/30 with (+) bronchiectasis   - SCx 6/30 with Pseudomonas Aeurginosa  - Continue on aggressive chest PT with Mucomyst, Duoneb and Chest Vest Q6H   - Continue on Symbicort BID and Singular QD   - s/p Prednisone 40mg QD (6/30 - 7/3)   - s/p Avycaz/ NELLY (6/30 - 7/2) and Cefepime (7/2 - 7/14); Can dc on levaquin  - Hypercarbia improved and now continued on NC QD and AVAPs QHS.   - Will need home NIV set up prior to DC   - Has outpatient f/u appt with Dr. Young on 7/15 per Chart Review for possible Lung transplant  - Fever to 101 wbc increased to 18, CX sent , Scx + moderate gm+ cocci in pairs -   - WBC trend down this am -await results of cx's  - Vanco level sub therapeutic and dose adjusted   - FU Sputum cx is normal prashanth Vanco dc   - Awaiting insurance approval for NIV at home     # Acute on Chronic Sinusitis   - CT SINUS 6/30 with extensive inflammatory changes throughout the paranasal sinuses and their respective drainage pathways, , improved in the right frontal and sphenoid sinuses since comparison. 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 Cannot rule out CSF leak. Thinning of the left carotid canal wall along the posterior left sphenoid sinus. Cannot rule out dehiscence.  - Continue on Flonase and [place] BID   - MRI Sinuses - No definite evidence of CSF leak.  - ENT following    # Nodular Liver with possible Cirrhosis ?   - During admission in April patient was found to have nodular contour of liver on US 4/6/2021. Upon further discussion with patient and Hepatology, she was noted to have history of blood transfusion in 1998 with (+) Hep C AB with negative Hep C RNA. She also reports history of CBD stricture in 1998 in [place], s/p PTC/PTBD.   - CLD work up previously noted with HBsAg negative, HBcIgM negative, Hep E IgG negative, Hep A IgM negative, RUPESH, Anti-LKM and AMA negative, Ferritin 169, AFP within normal limits, ASMA 1:40. MR/MRCP without stricturing or hepatic lesions. No ascites on all imaging and no history of HE. No known varices/ no EGD record on record.   - Echo - EF 73%, unremarkable   - Hepatology consulted, recs apreciated  - S/P liver bx 7/7; prelim results with evidence of hepatic fibrosis but no cirrhosis- f/u final results  - Liver bx with mild portal fibrosis   - Follow up in Hepatology Fellow clinic.  - MRCP  done- showing Cirrhotic morphology with no focal hepatic lesion.    # IDDM2, A1C 8.9 (6/2021)   - At home on: Lantus 14U QHS and sliding scale  - In house on: Lantus 15U QHS and Lispro 8U + Moderate ISS in house while on steroids.   - Will trend FS: adjustments will be made after evaluating dinner time FS if warranted     # Ethics/ DISPO/ GOC  - FULL CODE and GOC discussed with SonMichela ([phone]) who expresses wishes of DNI, however will discuss further for DNR. Family wishes for all medical treatment (including invasive and possible lung transplant) to be performed, however if her condition was to worsen they do not wish for intubation. GOC conversation ongoing.   - PT recommends home with home PT   - Awaiting approval for home NIV machine  - Desatting to 80s while eating; requiring to be placed back on avaps. Will plan for J&J COVID19 vaccine on discharge date.    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 Patient now presented from Pulmonary Office, Dr. Young for worsening SOB, lethargy and cough with thick yellow secretions. Now readmitted for acute on chronic hypercarbic respiratory failure likely in setting of bronchiectasis exacerbation with recurrent PNA.     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   - CT CHEST 6/30 with (+) bronchiectasis   - SCx 6/30 with Pseudomonas Aeurginosa  - Continue on aggressive chest PT with Mucomyst, Duoneb and Chest Vest Q6H   - Continue on Symbicort BID and Singular QD   - s/p Prednisone 40mg QD (6/30 - 7/3)   - s/p Avycaz/ NELLY (6/30 - 7/2) and Cefepime (7/2 - 7/14); Can dc on levaquin  - Hypercarbia improved and now continued on NC QD and AVAPs QHS.   - Will need home NIV set up prior to DC   - Has outpatient f/u appt with Dr. Young on 7/15 per Chart Review for possible Lung transplant  - Fever to 101 wbc increased to 18, CX sent , Scx + moderate gm+ cocci in pairs - Blood cx NGTD  - Awaiting insurance approval for NIV at home   - Life Vest 2000 trial today, and NIF/Vital capacity daily     # Acute on Chronic Sinusitis   - CT SINUS 6/30 with extensive inflammatory changes throughout the paranasal sinuses and their respective drainage pathways, , improved in the right frontal and sphenoid sinuses since comparison. 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 Cannot rule out CSF leak. Thinning of the left carotid canal wall along the posterior left sphenoid sinus. Cannot rule out dehiscence.  - Continue on Flonase and [place] BID   - MRI Sinuses - No definite evidence of CSF leak.  - ENT following - recs appreciated     # Nodular Liver with possible Cirrhosis ?   - During admission in April patient was found to have nodular contour of liver on US 4/6/2021. Upon further discussion with patient and Hepatology, she was noted to have history of blood transfusion in 1998 with (+) Hep C AB with negative Hep C RNA. She also reports history of CBD stricture in 1998 in [place], s/p PTC/PTBD.   - CLD work up previously noted with HBsAg negative, HBcIgM negative, Hep E IgG negative, Hep A IgM negative, RUPESH, Anti-LKM and AMA negative, Ferritin 169, AFP within normal limits, ASMA 1:40. MR/MRCP without stricturing or hepatic lesions. No ascites on all imaging and no history of HE. No known varices/ no EGD record on record.   - Echo - EF 73%, unremarkable   - Hepatology consulted, recs apreciated  - S/P liver bx 7/7; prelim results with evidence of hepatic fibrosis but no cirrhosis- f/u final results  - Liver bx with mild portal fibrosis   - Follow up in [facility]ology Fellow clinic.  - MRCP  done- showing Cirrhotic morphology with no focal hepatic lesion.    # IDDM2, A1C 8.9 (6/2021)   - At home on: Lantus 14U QHS and sliding scale  - In house on: Lantus 15U QHS and Lispro 8U + Moderate ISS in house while on steroids.   - Will trend FS: adjustments will be made after evaluating dinner time FS if warranted     # Ethics/ DISPO/ GOC  - FULL CODE and GOC discussed with Son, Michela ([phone]) who expresses wishes of DNI, however will discuss further for DNR. Family wishes for all medical treatment (including invasive and possible lung transplant) to be performed, however if her condition was to worsen they do not wish for intubation. GOC conversation ongoing.   - PT recommends home with home PT   - Awaiting approval for home NIV machine  - c/w AVAP as needed   - Will plan for J&J COVID19 vaccine on discharge date.    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 Patient now presented from Pulmonary Office, Dr. Young for worsening SOB, lethargy and cough with thick yellow secretions. Now readmitted for acute on chronic hypercarbic respiratory failure likely in setting of bronchiectasis exacerbation with recurrent PNA.     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   - CT CHEST 6/30 with (+) bronchiectasis   - SCx 6/30 with Pseudomonas Aeurginosa  - Continue on aggressive chest PT with Mucomyst, Duoneb and Chest Vest Q6H   - Continue on Symbicort BID and Singular QD   - s/p Prednisone 40mg QD (6/30 - 7/3)   - s/p Avycaz/ NELLY (6/30 - 7/2) and Cefepime (7/2 - 7/14); Can dc on levaquin  - Hypercarbia improved and now continued on NC QD and AVAPs QHS.   - Has outpatient f/u appt with Dr. Young on 7/15 per Chart Review for possible Lung transplant  - Fever to 101 wbc increased to 18, CX sent , Scx + moderate gm+ cocci in pairs - Blood cx NGTD  - Awaiting insurance approval for NIV at home   - Life Vest 2000 trial today    # Acute on Chronic Sinusitis   - CT SINUS 6/30 with extensive inflammatory changes throughout the paranasal sinuses and their respective drainage pathways, , improved in the right frontal and sphenoid sinuses since comparison. 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 Cannot rule out CSF leak. Thinning of the left carotid canal wall along the posterior left sphenoid sinus. Cannot rule out dehiscence.  - Continue on Flonase and [place] BID   - MRI Sinuses - No definite evidence of CSF leak.  - ENT following - recs appreciated     # Nodular Liver with possible Cirrhosis ?   - During admission in April patient was found to have nodular contour of liver on US 4/6/2021. Upon further discussion with patient and Hepatology, she was noted to have history of blood transfusion in 1998 with (+) Hep C AB with negative Hep C RNA. She also reports history of CBD stricture in 1998 in [place], s/p PTC/PTBD.   - CLD work up previously noted with HBsAg negative, HBcIgM negative, Hep E IgG negative, Hep A IgM negative, RUPESH, Anti-LKM and AMA negative, Ferritin 169, AFP within normal limits, ASMA 1:40. MR/MRCP without stricturing or hepatic lesions. No ascites on all imaging and no history of HE. No known varices/ no EGD record on record.   - Echo - EF 73%, unremarkable   - Hepatology consulted, recs apreciated  - S/P liver bx 7/7; prelim results with evidence of hepatic fibrosis but no cirrhosis- f/u final results  - Liver bx with mild portal fibrosis   - Follow up in Hepatology Fellow clinic.  - MRCP  done- showing Cirrhotic morphology with no focal hepatic lesion.    # IDDM2, A1C 8.9 (6/2021)   - At home on: Lantus 14U QHS and sliding scale  - In house on: Lantus 15U QHS and Lispro 8U + Moderate ISS in house while on steroids.   - Will trend FS: adjustments will be made after evaluating dinner time FS if warranted     # Ethics/ DISPO/ GOC  - FULL CODE and GOC discussed with Son, Michela ([phone]) who expresses wishes of DNI, however will discuss further for DNR. Family wishes for all medical treatment (including invasive and possible lung transplant) to be performed, however if her condition was to worsen they do not wish for intubation. GOC conversation ongoing.   - PT recommends home with home PT   - Awaiting approval for home NIV machine  - c/w AVAP as needed   - Will plan for J&J COVID19 vaccine on discharge date.    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 Patient now presented from Pulmonary Office, Dr. Young for worsening SOB, lethargy and cough with thick yellow secretions. Now readmitted for acute on chronic hypercarbic respiratory failure likely in setting of bronchiectasis exacerbation with recurrent PNA.     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   - CT CHEST 6/30 with (+) bronchiectasis   - SCx 6/30 with Pseudomonas Aeurginosa  - Continue on aggressive chest PT with Mucomyst, Duoneb and Chest Vest Q6H   - Continue on Symbicort BID and Singular QD   - s/p Prednisone 40mg QD (6/30 - 7/3)   - s/p Avycaz/ NELLY (6/30 - 7/2) and Cefepime (7/2 - 7/14); Can dc on levaquin  - Hypercarbia improved and now continued on NC QD and AVAPs QHS.   - Has outpatient f/u appt with Dr. Young on 7/15 per Chart Review for possible Lung transplant  - Fever to 101 wbc increased to 18, CX sent , Scx + moderate gm+ cocci in pairs - Blood cx NGTD  - Awaiting insurance approval for NIV at home   - Life 2000 trial today    # Acute on Chronic Sinusitis   - CT SINUS 6/30 with extensive inflammatory changes throughout the paranasal sinuses and their respective drainage pathways, , improved in the right frontal and sphenoid sinuses since comparison. 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 Cannot rule out CSF leak. Thinning of the left carotid canal wall along the posterior left sphenoid sinus. Cannot rule out dehiscence.  - Continue on Flonase and [place] BID   - MRI Sinuses - No definite evidence of CSF leak.  - ENT following - recs appreciated     # Nodular Liver with possible Cirrhosis ?   - During admission in April patient was found to have nodular contour of liver on US 4/6/2021. Upon further discussion with patient and Hepatology, she was noted to have history of blood transfusion in 1998 with (+) Hep C AB with negative Hep C RNA. She also reports history of CBD stricture in 1998 in [place], s/p PTC/PTBD.   - CLD work up previously noted with HBsAg negative, HBcIgM negative, Hep E IgG negative, Hep A IgM negative, RUPESH, Anti-LKM and AMA negative, Ferritin 169, AFP within normal limits, ASMA 1:40. MR/MRCP without stricturing or hepatic lesions. No ascites on all imaging and no history of HE. No known varices/ no EGD record on record.   - Echo - EF 73%, unremarkable   - Hepatology consulted, recs apreciated  - S/P liver bx 7/7; prelim results with evidence of hepatic fibrosis but no cirrhosis- f/u final results  - Liver bx with mild portal fibrosis   - Follow up in Hepatology Fellow clinic.  - MRCP  done- showing Cirrhotic morphology with no focal hepatic lesion.    # IDDM2, A1C 8.9 (6/2021)   - At home on: Lantus 14U QHS and sliding scale  - In house on: Lantus 15U QHS and Lispro 8U + Moderate ISS in house while on steroids.   - Will trend FS: adjustments will be made after evaluating dinner time FS if warranted     # Ethics/ DISPO/ GOC  - FULL CODE and GOC discussed with Son, Michela ([phone]) who expresses wishes of DNI, however will discuss further for DNR. Family wishes for all medical treatment (including invasive and possible lung transplant) to be performed, however if her condition was to worsen they do not wish for intubation. GOC conversation ongoing.   - PT recommends home with home PT   - Awaiting approval for home NIV machine  - c/w AVAP as needed   - Will plan for J&J COVID19 vaccine on discharge date.    # DVT PPX -Lovenox sc

## 2021-07-15 NOTE — PROGRESS NOTE ADULT - SUBJECTIVE AND OBJECTIVE BOX
CHIEF COMPLAINT: Patient is a 66y old  Female who presents with a chief complaint of SOB (14 Jul 2021 14:49)    Interval Events: None reported overnight.     REVIEW OF SYSTEMS:  Constitutional:   Eyes:  ENT:  CV:  Resp:  GI:  :  MSK:  Integumentary:  Neurological:  Psychiatric:  Endocrine:  Hematologic/Lymphatic:  Allergic/Immunologic:  [ ] All other systems negative  [ ] Unable to assess ROS because ________    OBJECTIVE:  ICU Vital Signs Last 24 Hrs  T(C): 36.4 (15 Jul 2021 06:28), Max: 36.6 (14 Jul 2021 14:32)  T(F): 97.5 (15 Jul 2021 06:28), Max: 97.9 (14 Jul 2021 14:32)  HR: 83 (15 Jul 2021 06:49) (83 - 97)  BP: 130/75 (15 Jul 2021 06:28) (130/75 - 148/70)  BP(mean): --  ABP: --  ABP(mean): --  RR: 19 (15 Jul 2021 06:28) (19 - 20)  SpO2: 98% (15 Jul 2021 06:49) (88% - 100%)    Mode: NIV (Noninvasive Ventilation), RR (machine): 18, TV (machine): 380, FiO2: 30, PEEP: 5, ITime: 1, PIP: 15    CAPILLARY BLOOD GLUCOSE      POCT Blood Glucose.: 113 mg/dL (15 Jul 2021 08:25)      PHYSICAL EXAM:  General:   HEENT:   Lymph Nodes:  Neck:   Respiratory:   Cardiovascular:   Abdomen:   Extremities:   Skin:   Neurological:  Psychiatry:    HOSPITAL MEDICATIONS:  MEDICATIONS  (STANDING):  albuterol/ipratropium for Nebulization 3 milliLiter(s) Nebulizer every 6 hours  amLODIPine   Tablet 5 milliGRAM(s) Oral daily  ascorbic acid 500 milliGRAM(s) Oral daily  budesonide 160 MICROgram(s)/formoterol 4.5 MICROgram(s) Inhaler 2 Puff(s) Inhalation two times a day  chlorhexidine 4% Liquid 1 Application(s) Topical daily  dextrose 40% Gel 15 Gram(s) Oral once  dextrose 5%. 1000 milliLiter(s) (50 mL/Hr) IV Continuous <Continuous>  dextrose 5%. 1000 milliLiter(s) (100 mL/Hr) IV Continuous <Continuous>  dextrose 50% Injectable 25 Gram(s) IV Push once  dextrose 50% Injectable 25 Gram(s) IV Push once  dextrose 50% Injectable 12.5 Gram(s) IV Push once  enoxaparin Injectable 40 milliGRAM(s) SubCutaneous daily  famotidine    Tablet 40 milliGRAM(s) Oral at bedtime  ferrous    sulfate 325 milliGRAM(s) Oral daily  fluticasone propionate 50 MICROgram(s)/spray Nasal Spray 2 Spray(s) Both Nostrils two times a day  glucagon  Injectable 1 milliGRAM(s) IntraMuscular once  insulin glargine Injectable (LANTUS) 15 Unit(s) SubCutaneous at bedtime  insulin lispro (ADMELOG) corrective regimen sliding scale   SubCutaneous three times a day before meals  insulin lispro (ADMELOG) corrective regimen sliding scale   SubCutaneous at bedtime  insulin lispro Injectable (ADMELOG) 8 Unit(s) SubCutaneous three times a day before meals  mirtazapine 7.5 milliGRAM(s) Oral at bedtime  montelukast 10 milliGRAM(s) Oral daily  pantoprazole    Tablet 40 milliGRAM(s) Oral before breakfast  predniSONE   Tablet 40 milliGRAM(s) Oral daily  senna 2 Tablet(s) Oral at bedtime  sodium chloride 0.65% Nasal 2 Spray(s) Both Nostrils every 12 hours  sodium chloride 3%  Inhalation 3 milliLiter(s) Inhalation every 6 hours  witch hazel Pads 1 Application(s) Topical three times a day    MEDICATIONS  (PRN):  acetaminophen   Tablet .. 650 milliGRAM(s) Oral every 6 hours PRN Temp greater or equal to 38C (100.4F)  acetaminophen   Tablet .. 650 milliGRAM(s) Oral every 4 hours PRN Mild Pain (1 - 3)  acetaminophen   Tablet .. 650 milliGRAM(s) Oral every 12 hours PRN Mild Pain (1 - 3), Moderate Pain (4 - 6)  guaiFENesin Oral Liquid (Sugar-Free) 100 milliGRAM(s) Oral every 6 hours PRN Cough  simethicone 80 milliGRAM(s) Chew three times a day PRN Gas      LABS:                        10.4   8.59  )-----------( 188      ( 15 Jul 2021 06:50 )             34.6     07-15    141  |  99  |  25<H>  ----------------------------<  119<H>  3.9   |  33<H>  |  0.48<L>    Ca    9.7      15 Jul 2021 06:50  Phos  3.9     07-14  Mg     1.90     07-15    TPro  6.7  /  Alb  3.3  /  TBili  <0.2  /  DBili  x   /  AST  37<H>  /  ALT  44<H>  /  AlkPhos  229<H>  07-15    MICROBIOLOGY:     RADIOLOGY:  [ ] Reviewed and interpreted by me    PULMONARY FUNCTION TESTS:    EKG: CHIEF COMPLAINT: Patient is a 66y old  Female who presents with a chief complaint of SOB (14 Jul 2021 14:49)    Interval Events: None reported overnight. Pt remained hemodynamically stable. Trial of Life Vest 2000 today, Chest PT. NIF and Vital Capacity daily. VSS, and medications reviewed.     REVIEW OF SYSTEMS:  see above   [x] All other systems negative    OBJECTIVE:  ICU Vital Signs Last 24 Hrs  T(C): 36.4 (15 Jul 2021 06:28), Max: 36.6 (14 Jul 2021 14:32)  T(F): 97.5 (15 Jul 2021 06:28), Max: 97.9 (14 Jul 2021 14:32)  HR: 83 (15 Jul 2021 06:49) (83 - 97)  BP: 130/75 (15 Jul 2021 06:28) (130/75 - 148/70)  BP(mean): --  ABP: --  ABP(mean): --  RR: 19 (15 Jul 2021 06:28) (19 - 20)  SpO2: 98% (15 Jul 2021 06:49) (88% - 100%)    Mode: NIV (Noninvasive Ventilation), RR (machine): 18, TV (machine): 380, FiO2: 30, PEEP: 5, ITime: 1, PIP: 15    CAPILLARY BLOOD GLUCOSE      POCT Blood Glucose.: 113 mg/dL (15 Jul 2021 08:25)      PHYSICAL EXAM  General: seating in chair, no acute distress, saturating well with AVAP  Neck: supple, no JVD  Respiratory: +decreased air entry, otherwise clear   Cardiovascular: +s1, s2  Abdomen: +BS, soft, nt/nd, no peritoneal signs noted   Extremities: no calf tenderness  Skin: as per RN assessment sheet   Neurological: non focal   Psychiatry: Appropriate mood and affect     HOSPITAL MEDICATIONS:  MEDICATIONS  (STANDING):  albuterol/ipratropium for Nebulization 3 milliLiter(s) Nebulizer every 6 hours  amLODIPine   Tablet 5 milliGRAM(s) Oral daily  ascorbic acid 500 milliGRAM(s) Oral daily  budesonide 160 MICROgram(s)/formoterol 4.5 MICROgram(s) Inhaler 2 Puff(s) Inhalation two times a day  chlorhexidine 4% Liquid 1 Application(s) Topical daily  dextrose 40% Gel 15 Gram(s) Oral once  dextrose 5%. 1000 milliLiter(s) (50 mL/Hr) IV Continuous <Continuous>  dextrose 5%. 1000 milliLiter(s) (100 mL/Hr) IV Continuous <Continuous>  dextrose 50% Injectable 25 Gram(s) IV Push once  dextrose 50% Injectable 25 Gram(s) IV Push once  dextrose 50% Injectable 12.5 Gram(s) IV Push once  enoxaparin Injectable 40 milliGRAM(s) SubCutaneous daily  famotidine    Tablet 40 milliGRAM(s) Oral at bedtime  ferrous    sulfate 325 milliGRAM(s) Oral daily  fluticasone propionate 50 MICROgram(s)/spray Nasal Spray 2 Spray(s) Both Nostrils two times a day  glucagon  Injectable 1 milliGRAM(s) IntraMuscular once  insulin glargine Injectable (LANTUS) 15 Unit(s) SubCutaneous at bedtime  insulin lispro (ADMELOG) corrective regimen sliding scale   SubCutaneous three times a day before meals  insulin lispro (ADMELOG) corrective regimen sliding scale   SubCutaneous at bedtime  insulin lispro Injectable (ADMELOG) 8 Unit(s) SubCutaneous three times a day before meals  mirtazapine 7.5 milliGRAM(s) Oral at bedtime  montelukast 10 milliGRAM(s) Oral daily  pantoprazole    Tablet 40 milliGRAM(s) Oral before breakfast  predniSONE   Tablet 40 milliGRAM(s) Oral daily  senna 2 Tablet(s) Oral at bedtime  sodium chloride 0.65% Nasal 2 Spray(s) Both Nostrils every 12 hours  sodium chloride 3%  Inhalation 3 milliLiter(s) Inhalation every 6 hours  witch hazel Pads 1 Application(s) Topical three times a day    MEDICATIONS  (PRN):  acetaminophen   Tablet .. 650 milliGRAM(s) Oral every 6 hours PRN Temp greater or equal to 38C (100.4F)  acetaminophen   Tablet .. 650 milliGRAM(s) Oral every 4 hours PRN Mild Pain (1 - 3)  acetaminophen   Tablet .. 650 milliGRAM(s) Oral every 12 hours PRN Mild Pain (1 - 3), Moderate Pain (4 - 6)  guaiFENesin Oral Liquid (Sugar-Free) 100 milliGRAM(s) Oral every 6 hours PRN Cough  simethicone 80 milliGRAM(s) Chew three times a day PRN Gas      LABS:                        10.4   8.59  )-----------( 188      ( 15 Jul 2021 06:50 )             34.6     07-15    141  |  99  |  25<H>  ----------------------------<  119<H>  3.9   |  33<H>  |  0.48<L>    Ca    9.7      15 Jul 2021 06:50  Phos  3.9     07-14  Mg     1.90     07-15    TPro  6.7  /  Alb  3.3  /  TBili  <0.2  /  DBili  x   /  AST  37<H>  /  ALT  44<H>  /  AlkPhos  229<H>  07-15    MICROBIOLOGY:     RADIOLOGY:  [ ] Reviewed and interpreted by me    PULMONARY FUNCTION TESTS:    EKG: CHIEF COMPLAINT: Patient is a 66y old  Female who presents with a chief complaint of SOB (14 Jul 2021 14:49)    Interval Events: None reported overnight. Pt remained hemodynamically stable. Trial of Life Vest 2000 today, Chest PT. VSS, and medications reviewed.     REVIEW OF SYSTEMS:  see above   [x] All other systems negative    OBJECTIVE:  ICU Vital Signs Last 24 Hrs  T(C): 36.4 (15 Jul 2021 06:28), Max: 36.6 (14 Jul 2021 14:32)  T(F): 97.5 (15 Jul 2021 06:28), Max: 97.9 (14 Jul 2021 14:32)  HR: 83 (15 Jul 2021 06:49) (83 - 97)  BP: 130/75 (15 Jul 2021 06:28) (130/75 - 148/70)  BP(mean): --  ABP: --  ABP(mean): --  RR: 19 (15 Jul 2021 06:28) (19 - 20)  SpO2: 98% (15 Jul 2021 06:49) (88% - 100%)    Mode: NIV (Noninvasive Ventilation), RR (machine): 18, TV (machine): 380, FiO2: 30, PEEP: 5, ITime: 1, PIP: 15    CAPILLARY BLOOD GLUCOSE      POCT Blood Glucose.: 113 mg/dL (15 Jul 2021 08:25)      PHYSICAL EXAM  General: seating in chair, no acute distress, saturating well with AVAP  Neck: supple, no JVD  Respiratory: +decreased air entry, otherwise clear   Cardiovascular: +s1, s2  Abdomen: +BS, soft, nt/nd, no peritoneal signs noted   Extremities: no calf tenderness  Skin: as per RN assessment sheet   Neurological: non focal   Psychiatry: Appropriate mood and affect     HOSPITAL MEDICATIONS:  MEDICATIONS  (STANDING):  albuterol/ipratropium for Nebulization 3 milliLiter(s) Nebulizer every 6 hours  amLODIPine   Tablet 5 milliGRAM(s) Oral daily  ascorbic acid 500 milliGRAM(s) Oral daily  budesonide 160 MICROgram(s)/formoterol 4.5 MICROgram(s) Inhaler 2 Puff(s) Inhalation two times a day  chlorhexidine 4% Liquid 1 Application(s) Topical daily  dextrose 40% Gel 15 Gram(s) Oral once  dextrose 5%. 1000 milliLiter(s) (50 mL/Hr) IV Continuous <Continuous>  dextrose 5%. 1000 milliLiter(s) (100 mL/Hr) IV Continuous <Continuous>  dextrose 50% Injectable 25 Gram(s) IV Push once  dextrose 50% Injectable 25 Gram(s) IV Push once  dextrose 50% Injectable 12.5 Gram(s) IV Push once  enoxaparin Injectable 40 milliGRAM(s) SubCutaneous daily  famotidine    Tablet 40 milliGRAM(s) Oral at bedtime  ferrous    sulfate 325 milliGRAM(s) Oral daily  fluticasone propionate 50 MICROgram(s)/spray Nasal Spray 2 Spray(s) Both Nostrils two times a day  glucagon  Injectable 1 milliGRAM(s) IntraMuscular once  insulin glargine Injectable (LANTUS) 15 Unit(s) SubCutaneous at bedtime  insulin lispro (ADMELOG) corrective regimen sliding scale   SubCutaneous three times a day before meals  insulin lispro (ADMELOG) corrective regimen sliding scale   SubCutaneous at bedtime  insulin lispro Injectable (ADMELOG) 8 Unit(s) SubCutaneous three times a day before meals  mirtazapine 7.5 milliGRAM(s) Oral at bedtime  montelukast 10 milliGRAM(s) Oral daily  pantoprazole    Tablet 40 milliGRAM(s) Oral before breakfast  predniSONE   Tablet 40 milliGRAM(s) Oral daily  senna 2 Tablet(s) Oral at bedtime  sodium chloride 0.65% Nasal 2 Spray(s) Both Nostrils every 12 hours  sodium chloride 3%  Inhalation 3 milliLiter(s) Inhalation every 6 hours  witch hazel Pads 1 Application(s) Topical three times a day    MEDICATIONS  (PRN):  acetaminophen   Tablet .. 650 milliGRAM(s) Oral every 6 hours PRN Temp greater or equal to 38C (100.4F)  acetaminophen   Tablet .. 650 milliGRAM(s) Oral every 4 hours PRN Mild Pain (1 - 3)  acetaminophen   Tablet .. 650 milliGRAM(s) Oral every 12 hours PRN Mild Pain (1 - 3), Moderate Pain (4 - 6)  guaiFENesin Oral Liquid (Sugar-Free) 100 milliGRAM(s) Oral every 6 hours PRN Cough  simethicone 80 milliGRAM(s) Chew three times a day PRN Gas      LABS:                        10.4   8.59  )-----------( 188      ( 15 Jul 2021 06:50 )             34.6     07-15    141  |  99  |  25<H>  ----------------------------<  119<H>  3.9   |  33<H>  |  0.48<L>    Ca    9.7      15 Jul 2021 06:50  Phos  3.9     07-14  Mg     1.90     07-15    TPro  6.7  /  Alb  3.3  /  TBili  <0.2  /  DBili  x   /  AST  37<H>  /  ALT  44<H>  /  AlkPhos  229<H>  07-15    MICROBIOLOGY:     RADIOLOGY:  [ ] Reviewed and interpreted by me    PULMONARY FUNCTION TESTS:    EKG: CHIEF COMPLAINT: Patient is a 66y old  Female who presents with a chief complaint of SOB (14 Jul 2021 14:49)    Interval Events: None reported overnight. Pt remained hemodynamically stable. Trial of Life 2000 today, Chest PT. VSS, and medications reviewed.     REVIEW OF SYSTEMS:  see above   [x] All other systems negative    OBJECTIVE:  ICU Vital Signs Last 24 Hrs  T(C): 36.4 (15 Jul 2021 06:28), Max: 36.6 (14 Jul 2021 14:32)  T(F): 97.5 (15 Jul 2021 06:28), Max: 97.9 (14 Jul 2021 14:32)  HR: 83 (15 Jul 2021 06:49) (83 - 97)  BP: 130/75 (15 Jul 2021 06:28) (130/75 - 148/70)  BP(mean): --  ABP: --  ABP(mean): --  RR: 19 (15 Jul 2021 06:28) (19 - 20)  SpO2: 98% (15 Jul 2021 06:49) (88% - 100%)    Mode: NIV (Noninvasive Ventilation), RR (machine): 18, TV (machine): 380, FiO2: 30, PEEP: 5, ITime: 1, PIP: 15    CAPILLARY BLOOD GLUCOSE      POCT Blood Glucose.: 113 mg/dL (15 Jul 2021 08:25)      PHYSICAL EXAM  General: seating in chair, no acute distress, saturating well with AVAP  Neck: supple, no JVD  Respiratory: +decreased air entry, otherwise clear   Cardiovascular: +s1, s2  Abdomen: +BS, soft, nt/nd, no peritoneal signs noted   Extremities: no calf tenderness  Skin: as per RN assessment sheet   Neurological: non focal   Psychiatry: Appropriate mood and affect     HOSPITAL MEDICATIONS:  MEDICATIONS  (STANDING):  albuterol/ipratropium for Nebulization 3 milliLiter(s) Nebulizer every 6 hours  amLODIPine   Tablet 5 milliGRAM(s) Oral daily  ascorbic acid 500 milliGRAM(s) Oral daily  budesonide 160 MICROgram(s)/formoterol 4.5 MICROgram(s) Inhaler 2 Puff(s) Inhalation two times a day  chlorhexidine 4% Liquid 1 Application(s) Topical daily  dextrose 40% Gel 15 Gram(s) Oral once  dextrose 5%. 1000 milliLiter(s) (50 mL/Hr) IV Continuous <Continuous>  dextrose 5%. 1000 milliLiter(s) (100 mL/Hr) IV Continuous <Continuous>  dextrose 50% Injectable 25 Gram(s) IV Push once  dextrose 50% Injectable 25 Gram(s) IV Push once  dextrose 50% Injectable 12.5 Gram(s) IV Push once  enoxaparin Injectable 40 milliGRAM(s) SubCutaneous daily  famotidine    Tablet 40 milliGRAM(s) Oral at bedtime  ferrous    sulfate 325 milliGRAM(s) Oral daily  fluticasone propionate 50 MICROgram(s)/spray Nasal Spray 2 Spray(s) Both Nostrils two times a day  glucagon  Injectable 1 milliGRAM(s) IntraMuscular once  insulin glargine Injectable (LANTUS) 15 Unit(s) SubCutaneous at bedtime  insulin lispro (ADMELOG) corrective regimen sliding scale   SubCutaneous three times a day before meals  insulin lispro (ADMELOG) corrective regimen sliding scale   SubCutaneous at bedtime  insulin lispro Injectable (ADMELOG) 8 Unit(s) SubCutaneous three times a day before meals  mirtazapine 7.5 milliGRAM(s) Oral at bedtime  montelukast 10 milliGRAM(s) Oral daily  pantoprazole    Tablet 40 milliGRAM(s) Oral before breakfast  predniSONE   Tablet 40 milliGRAM(s) Oral daily  senna 2 Tablet(s) Oral at bedtime  sodium chloride 0.65% Nasal 2 Spray(s) Both Nostrils every 12 hours  sodium chloride 3%  Inhalation 3 milliLiter(s) Inhalation every 6 hours  witch hazel Pads 1 Application(s) Topical three times a day    MEDICATIONS  (PRN):  acetaminophen   Tablet .. 650 milliGRAM(s) Oral every 6 hours PRN Temp greater or equal to 38C (100.4F)  acetaminophen   Tablet .. 650 milliGRAM(s) Oral every 4 hours PRN Mild Pain (1 - 3)  acetaminophen   Tablet .. 650 milliGRAM(s) Oral every 12 hours PRN Mild Pain (1 - 3), Moderate Pain (4 - 6)  guaiFENesin Oral Liquid (Sugar-Free) 100 milliGRAM(s) Oral every 6 hours PRN Cough  simethicone 80 milliGRAM(s) Chew three times a day PRN Gas      LABS:                        10.4   8.59  )-----------( 188      ( 15 Jul 2021 06:50 )             34.6     07-15    141  |  99  |  25<H>  ----------------------------<  119<H>  3.9   |  33<H>  |  0.48<L>    Ca    9.7      15 Jul 2021 06:50  Phos  3.9     07-14  Mg     1.90     07-15    TPro  6.7  /  Alb  3.3  /  TBili  <0.2  /  DBili  x   /  AST  37<H>  /  ALT  44<H>  /  AlkPhos  229<H>  07-15    MICROBIOLOGY:     RADIOLOGY:  [ ] Reviewed and interpreted by me    PULMONARY FUNCTION TESTS:    EKG:

## 2021-07-15 NOTE — PROGRESS NOTE ADULT - ATTENDING COMMENTS
seen and examined with acp, agree with above  completed abx. nothing acute on CT  has desaturations to high 80s on NC at times, returns to AVAPS  trial of life 2000  chest pt  tolerate sat of 88 or higher

## 2021-07-16 ENCOUNTER — TRANSCRIPTION ENCOUNTER (OUTPATIENT)
Age: 66
End: 2021-07-16

## 2021-07-16 LAB
GLUCOSE BLDC GLUCOMTR-MCNC: 153 MG/DL — HIGH (ref 70–99)
GLUCOSE BLDC GLUCOMTR-MCNC: 167 MG/DL — HIGH (ref 70–99)
GLUCOSE BLDC GLUCOMTR-MCNC: 262 MG/DL — HIGH (ref 70–99)
GLUCOSE BLDC GLUCOMTR-MCNC: 316 MG/DL — HIGH (ref 70–99)
GLUCOSE BLDC GLUCOMTR-MCNC: 340 MG/DL — HIGH (ref 70–99)

## 2021-07-16 PROCEDURE — 99233 SBSQ HOSP IP/OBS HIGH 50: CPT | Mod: GC

## 2021-07-16 RX ORDER — JNJ-78436735 50000000000 [PFU]/.5ML
0.5 SUSPENSION INTRAMUSCULAR ONCE
Refills: 0 | Status: DISCONTINUED | OUTPATIENT
Start: 2021-07-16 | End: 2021-07-17

## 2021-07-16 RX ORDER — AMLODIPINE BESYLATE 2.5 MG/1
5 TABLET ORAL DAILY
Refills: 0 | Status: DISCONTINUED | OUTPATIENT
Start: 2021-07-16 | End: 2021-07-19

## 2021-07-16 RX ADMIN — BUDESONIDE AND FORMOTEROL FUMARATE DIHYDRATE 2 PUFF(S): 160; 4.5 AEROSOL RESPIRATORY (INHALATION) at 11:11

## 2021-07-16 RX ADMIN — SENNA PLUS 2 TABLET(S): 8.6 TABLET ORAL at 21:20

## 2021-07-16 RX ADMIN — Medication 2: at 08:32

## 2021-07-16 RX ADMIN — CHLORHEXIDINE GLUCONATE 1 APPLICATION(S): 213 SOLUTION TOPICAL at 12:06

## 2021-07-16 RX ADMIN — ENOXAPARIN SODIUM 40 MILLIGRAM(S): 100 INJECTION SUBCUTANEOUS at 12:05

## 2021-07-16 RX ADMIN — Medication 3 MILLILITER(S): at 10:52

## 2021-07-16 RX ADMIN — MONTELUKAST 10 MILLIGRAM(S): 4 TABLET, CHEWABLE ORAL at 12:05

## 2021-07-16 RX ADMIN — Medication 40 MILLIGRAM(S): at 05:44

## 2021-07-16 RX ADMIN — Medication 3 MILLILITER(S): at 16:46

## 2021-07-16 RX ADMIN — AER TRAVELER 1 APPLICATION(S): 0.5 SOLUTION RECTAL; TOPICAL at 22:00

## 2021-07-16 RX ADMIN — SODIUM CHLORIDE 3 MILLILITER(S): 9 INJECTION INTRAMUSCULAR; INTRAVENOUS; SUBCUTANEOUS at 10:59

## 2021-07-16 RX ADMIN — BUDESONIDE AND FORMOTEROL FUMARATE DIHYDRATE 2 PUFF(S): 160; 4.5 AEROSOL RESPIRATORY (INHALATION) at 22:12

## 2021-07-16 RX ADMIN — Medication 325 MILLIGRAM(S): at 12:05

## 2021-07-16 RX ADMIN — MIRTAZAPINE 7.5 MILLIGRAM(S): 45 TABLET, ORALLY DISINTEGRATING ORAL at 21:20

## 2021-07-16 RX ADMIN — Medication 3 MILLILITER(S): at 03:36

## 2021-07-16 RX ADMIN — Medication 2: at 12:02

## 2021-07-16 RX ADMIN — Medication 8 UNIT(S): at 08:33

## 2021-07-16 RX ADMIN — INSULIN GLARGINE 15 UNIT(S): 100 INJECTION, SOLUTION SUBCUTANEOUS at 21:23

## 2021-07-16 RX ADMIN — Medication 500 MILLIGRAM(S): at 12:05

## 2021-07-16 RX ADMIN — SODIUM CHLORIDE 3 MILLILITER(S): 9 INJECTION INTRAMUSCULAR; INTRAVENOUS; SUBCUTANEOUS at 16:46

## 2021-07-16 RX ADMIN — Medication 2 SPRAY(S): at 17:24

## 2021-07-16 RX ADMIN — Medication 8 UNIT(S): at 17:23

## 2021-07-16 RX ADMIN — FAMOTIDINE 40 MILLIGRAM(S): 10 INJECTION INTRAVENOUS at 21:19

## 2021-07-16 RX ADMIN — SIMETHICONE 80 MILLIGRAM(S): 80 TABLET, CHEWABLE ORAL at 17:22

## 2021-07-16 RX ADMIN — Medication 6: at 17:23

## 2021-07-16 RX ADMIN — PANTOPRAZOLE SODIUM 40 MILLIGRAM(S): 20 TABLET, DELAYED RELEASE ORAL at 05:45

## 2021-07-16 RX ADMIN — Medication 2 SPRAY(S): at 17:25

## 2021-07-16 RX ADMIN — AMLODIPINE BESYLATE 5 MILLIGRAM(S): 2.5 TABLET ORAL at 05:44

## 2021-07-16 RX ADMIN — SODIUM CHLORIDE 3 MILLILITER(S): 9 INJECTION INTRAMUSCULAR; INTRAVENOUS; SUBCUTANEOUS at 22:07

## 2021-07-16 RX ADMIN — Medication 4: at 21:24

## 2021-07-16 RX ADMIN — SODIUM CHLORIDE 3 MILLILITER(S): 9 INJECTION INTRAMUSCULAR; INTRAVENOUS; SUBCUTANEOUS at 03:38

## 2021-07-16 RX ADMIN — Medication 2 SPRAY(S): at 05:45

## 2021-07-16 RX ADMIN — Medication 3 MILLILITER(S): at 22:07

## 2021-07-16 RX ADMIN — AMLODIPINE BESYLATE 5 MILLIGRAM(S): 2.5 TABLET ORAL at 12:05

## 2021-07-16 RX ADMIN — Medication 8 UNIT(S): at 12:03

## 2021-07-16 NOTE — PROGRESS NOTE ADULT - SUBJECTIVE AND OBJECTIVE BOX
CHIEF COMPLAINT: Patient is a 66y old Female who presents with a chief complaint of SOB.    Interval Events:    REVIEW OF SYSTEMS:  [ ] All other systems negative  [ ] Unable to assess ROS because ________    OBJECTIVE:  ICU Vital Signs Last 24 Hrs  T(C): 36.4 (16 Jul 2021 05:41), Max: 36.4 (16 Jul 2021 05:41)  T(F): 97.6 (16 Jul 2021 05:41), Max: 97.6 (16 Jul 2021 05:41)  HR: 79 (16 Jul 2021 06:03) (76 - 100)  BP: 129/62 (16 Jul 2021 05:41) (129/62 - 146/72)  RR: 18 (16 Jul 2021 05:41) (18 - 19)  SpO2: 100% (16 Jul 2021 06:03) (95% - 100%)    Mode: standby    CAPILLARY BLOOD GLUCOSE      POCT Blood Glucose.: 186 mg/dL (15 Jul 2021 21:42)      HOSPITAL MEDICATIONS:  MEDICATIONS  (STANDING):  albuterol/ipratropium for Nebulization 3 milliLiter(s) Nebulizer every 6 hours  amLODIPine   Tablet 5 milliGRAM(s) Oral daily  ascorbic acid 500 milliGRAM(s) Oral daily  budesonide 160 MICROgram(s)/formoterol 4.5 MICROgram(s) Inhaler 2 Puff(s) Inhalation two times a day  chlorhexidine 4% Liquid 1 Application(s) Topical daily  dextrose 40% Gel 15 Gram(s) Oral once  dextrose 5%. 1000 milliLiter(s) (50 mL/Hr) IV Continuous <Continuous>  dextrose 5%. 1000 milliLiter(s) (100 mL/Hr) IV Continuous <Continuous>  dextrose 50% Injectable 25 Gram(s) IV Push once  dextrose 50% Injectable 12.5 Gram(s) IV Push once  dextrose 50% Injectable 25 Gram(s) IV Push once  enoxaparin Injectable 40 milliGRAM(s) SubCutaneous daily  famotidine    Tablet 40 milliGRAM(s) Oral at bedtime  ferrous    sulfate 325 milliGRAM(s) Oral daily  fluticasone propionate 50 MICROgram(s)/spray Nasal Spray 2 Spray(s) Both Nostrils two times a day  glucagon  Injectable 1 milliGRAM(s) IntraMuscular once  insulin glargine Injectable (LANTUS) 15 Unit(s) SubCutaneous at bedtime  insulin lispro (ADMELOG) corrective regimen sliding scale   SubCutaneous three times a day before meals  insulin lispro (ADMELOG) corrective regimen sliding scale   SubCutaneous at bedtime  insulin lispro Injectable (ADMELOG) 8 Unit(s) SubCutaneous three times a day before meals  mirtazapine 7.5 milliGRAM(s) Oral at bedtime  montelukast 10 milliGRAM(s) Oral daily  pantoprazole    Tablet 40 milliGRAM(s) Oral before breakfast  predniSONE   Tablet 40 milliGRAM(s) Oral daily  senna 2 Tablet(s) Oral at bedtime  sodium chloride 0.65% Nasal 2 Spray(s) Both Nostrils every 12 hours  sodium chloride 3%  Inhalation 3 milliLiter(s) Inhalation every 6 hours  witch hazel Pads 1 Application(s) Topical three times a day    MEDICATIONS  (PRN):  acetaminophen   Tablet .. 650 milliGRAM(s) Oral every 12 hours PRN Mild Pain (1 - 3), Moderate Pain (4 - 6)  acetaminophen   Tablet .. 650 milliGRAM(s) Oral every 4 hours PRN Mild Pain (1 - 3)  acetaminophen   Tablet .. 650 milliGRAM(s) Oral every 6 hours PRN Temp greater or equal to 38C (100.4F)  guaiFENesin Oral Liquid (Sugar-Free) 100 milliGRAM(s) Oral every 6 hours PRN Cough  simethicone 80 milliGRAM(s) Chew three times a day PRN Gas      LABS:                          MICROBIOLOGY:     RADIOLOGY:  [ ] Reviewed and interpreted by me    PULMONARY FUNCTION TESTS:    EKG: CHIEF COMPLAINT: Patient is a 66y old Female who presents with a chief complaint of SOB.    Interval Events: No interval events noted overnight.    REVIEW OF SYSTEMS:  Complaining of cough and SOB. Denies CP, abd pain, N/V.  [x] All other systems negative    OBJECTIVE:  ICU Vital Signs Last 24 Hrs  T(C): 36.4 (16 Jul 2021 05:41), Max: 36.4 (16 Jul 2021 05:41)  T(F): 97.6 (16 Jul 2021 05:41), Max: 97.6 (16 Jul 2021 05:41)  HR: 79 (16 Jul 2021 06:03) (76 - 100)  BP: 129/62 (16 Jul 2021 05:41) (129/62 - 146/72)  RR: 18 (16 Jul 2021 05:41) (18 - 19)  SpO2: 100% (16 Jul 2021 06:03) (95% - 100%)    Mode: standby    CAPILLARY BLOOD GLUCOSE      POCT Blood Glucose.: 186 mg/dL (15 Jul 2021 21:42)      HOSPITAL MEDICATIONS:  MEDICATIONS  (STANDING):  albuterol/ipratropium for Nebulization 3 milliLiter(s) Nebulizer every 6 hours  amLODIPine   Tablet 5 milliGRAM(s) Oral daily  ascorbic acid 500 milliGRAM(s) Oral daily  budesonide 160 MICROgram(s)/formoterol 4.5 MICROgram(s) Inhaler 2 Puff(s) Inhalation two times a day  chlorhexidine 4% Liquid 1 Application(s) Topical daily  dextrose 40% Gel 15 Gram(s) Oral once  dextrose 5%. 1000 milliLiter(s) (50 mL/Hr) IV Continuous <Continuous>  dextrose 5%. 1000 milliLiter(s) (100 mL/Hr) IV Continuous <Continuous>  dextrose 50% Injectable 25 Gram(s) IV Push once  dextrose 50% Injectable 12.5 Gram(s) IV Push once  dextrose 50% Injectable 25 Gram(s) IV Push once  enoxaparin Injectable 40 milliGRAM(s) SubCutaneous daily  famotidine    Tablet 40 milliGRAM(s) Oral at bedtime  ferrous    sulfate 325 milliGRAM(s) Oral daily  fluticasone propionate 50 MICROgram(s)/spray Nasal Spray 2 Spray(s) Both Nostrils two times a day  glucagon  Injectable 1 milliGRAM(s) IntraMuscular once  insulin glargine Injectable (LANTUS) 15 Unit(s) SubCutaneous at bedtime  insulin lispro (ADMELOG) corrective regimen sliding scale   SubCutaneous three times a day before meals  insulin lispro (ADMELOG) corrective regimen sliding scale   SubCutaneous at bedtime  insulin lispro Injectable (ADMELOG) 8 Unit(s) SubCutaneous three times a day before meals  mirtazapine 7.5 milliGRAM(s) Oral at bedtime  montelukast 10 milliGRAM(s) Oral daily  pantoprazole    Tablet 40 milliGRAM(s) Oral before breakfast  predniSONE   Tablet 40 milliGRAM(s) Oral daily  senna 2 Tablet(s) Oral at bedtime  sodium chloride 0.65% Nasal 2 Spray(s) Both Nostrils every 12 hours  sodium chloride 3%  Inhalation 3 milliLiter(s) Inhalation every 6 hours  witch hazel Pads 1 Application(s) Topical three times a day    MEDICATIONS  (PRN):  acetaminophen   Tablet .. 650 milliGRAM(s) Oral every 12 hours PRN Mild Pain (1 - 3), Moderate Pain (4 - 6)  acetaminophen   Tablet .. 650 milliGRAM(s) Oral every 4 hours PRN Mild Pain (1 - 3)  acetaminophen   Tablet .. 650 milliGRAM(s) Oral every 6 hours PRN Temp greater or equal to 38C (100.4F)  guaiFENesin Oral Liquid (Sugar-Free) 100 milliGRAM(s) Oral every 6 hours PRN Cough  simethicone 80 milliGRAM(s) Chew three times a day PRN Gas      LABS:                          MICROBIOLOGY:     RADIOLOGY:  [ ] Reviewed and interpreted by me    PULMONARY FUNCTION TESTS:    EKG:

## 2021-07-16 NOTE — DISCHARGE NOTE NURSING/CASE MANAGEMENT/SOCIAL WORK - PATIENT PORTAL LINK FT
You can access the FollowMyHealth Patient Portal offered by [facility] by registering at the following website: [email or web address]. By joining [facility]’s FollowMyHealth portal, you will also be able to view your health information using other applications (apps) compatible with our system.

## 2021-07-16 NOTE — DISCHARGE NOTE NURSING/CASE MANAGEMENT/SOCIAL WORK - NSSCTYPOFSERV_GEN_ALL_CORE
Visiting Nurse Services, Home Physical Therapy. The 1st Visiting Nurse Visit will be when your Insurance company provides Authorization. The Nurse will then call to arrange visit date & time.

## 2021-07-16 NOTE — PROGRESS NOTE ADULT - ATTENDING COMMENTS
seen and examined with acp, agree with above  stable today- nocturnal avaps and daytime 02.  abx completed. chest PT  pt is medically stable for d/c  however, her insurance company denied the AVAPS  -- "you have not tried an failed CPAP" is the reason listed  this makes no medical sense at al  I have called [employer] insurance and filed an appeal -- which is to be sent to an physician, however, they do not allow direct physician to physician conversation

## 2021-07-16 NOTE — PROGRESS NOTE ADULT - ASSESSMENT
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 Patient now presented from Pulmonary Office, Dr. Young for worsening SOB, lethargy and cough with thick yellow secretions. Now readmitted for acute on chronic hypercarbic respiratory failure likely in setting of bronchiectasis exacerbation with recurrent PNA.     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   - CT CHEST 6/30 with (+) bronchiectasis   - SCx 6/30 with Pseudomonas Aeurginosa  - Continue on aggressive chest PT with Mucomyst, Duoneb and Chest Vest Q6H   - Continue on Symbicort BID and Singular QD   - s/p Prednisone 40mg QD (6/30 - 7/3)   - s/p Avycaz/ NELLY (6/30 - 7/2) and Cefepime (7/2 - 7/14); Can dc on levaquin  - Hypercarbia improved and now continued on NC QD and AVAPs QHS.   - Has outpatient f/u appt with Dr. Young on 7/15 per Chart Review for possible Lung transplant  - Fever to 101 wbc increased to 18, CX sent , Scx + moderate gm+ cocci in pairs - Blood cx NGTD  - Awaiting insurance approval for NIV at home   - Life 2000 trial today    # Acute on Chronic Sinusitis   - CT SINUS 6/30 with extensive inflammatory changes throughout the paranasal sinuses and their respective drainage pathways, , improved in the right frontal and sphenoid sinuses since comparison. 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 Cannot rule out CSF leak. Thinning of the left carotid canal wall along the posterior left sphenoid sinus. Cannot rule out dehiscence.  - Continue on Flonase and [place] BID   - MRI Sinuses - No definite evidence of CSF leak.  - ENT following - recs appreciated     # Nodular Liver with possible Cirrhosis ?   - During admission in April patient was found to have nodular contour of liver on US 4/6/2021. Upon further discussion with patient and Hepatology, she was noted to have history of blood transfusion in 1998 with (+) Hep C AB with negative Hep C RNA. She also reports history of CBD stricture in 1998 in [place], s/p PTC/PTBD.   - CLD work up previously noted with HBsAg negative, HBcIgM negative, Hep E IgG negative, Hep A IgM negative, RUPESH, Anti-LKM and AMA negative, Ferritin 169, AFP within normal limits, ASMA 1:40. MR/MRCP without stricturing or hepatic lesions. No ascites on all imaging and no history of HE. No known varices/ no EGD record on record.   - Echo - EF 73%, unremarkable   - Hepatology consulted, recs apreciated  - S/P liver bx 7/7; prelim results with evidence of hepatic fibrosis but no cirrhosis- f/u final results  - Liver bx with mild portal fibrosis   - Follow up in Hepatology Fellow clinic.  - MRCP  done- showing Cirrhotic morphology with no focal hepatic lesion.    # IDDM2, A1C 8.9 (6/2021)   - At home on: Lantus 14U QHS and sliding scale  - In house on: Lantus 15U QHS and Lispro 8U + Moderate ISS in house while on steroids.   - Will trend FS: adjustments will be made after evaluating dinner time FS if warranted     # Ethics/ DISPO/ GOC  - FULL CODE and GOC discussed with Son, Michela ([phone]) who expresses wishes of DNI, however will discuss further for DNR. Family wishes for all medical treatment (including invasive and possible lung transplant) to be performed, however if her condition was to worsen they do not wish for intubation. GOC conversation ongoing.   - PT recommends home with home PT   - Awaiting approval for home NIV machine  - c/w AVAP as needed   - Will plan for J&J COVID19 vaccine on discharge date.    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 Patient now presented from Pulmonary Office, Dr. Young for worsening SOB, lethargy and cough with thick yellow secretions. Now readmitted for acute on chronic hypercarbic respiratory failure likely in setting of bronchiectasis exacerbation with recurrent PNA.     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   - CT CHEST 6/30 with (+) bronchiectasis   - SCx 6/30 with Pseudomonas Aeurginosa  - Continue on aggressive chest PT with Mucomyst, Duoneb and Chest Vest Q6H   - Continue on Symbicort BID and Singular QD   - s/p Prednisone 40mg QD (6/30 - 7/3)   - s/p Avycaz/ NELLY (6/30 - 7/2) and Cefepime (7/2 - 7/14); Can dc on levaquin  - Hypercarbia improved and now continued on NC QD and AVAPs QHS.   - Has outpatient f/u appt with Dr. Young on 7/15 per Chart Review for possible Lung transplant  - Fever to 101 wbc increased to 18, CX sent , Scx + moderate gm+ cocci in pairs - Blood cx NGTD  - Awaiting insurance approval for NIV at home     # Acute on Chronic Sinusitis   - CT SINUS 6/30 with extensive inflammatory changes throughout the paranasal sinuses and their respective drainage pathways, , improved in the right frontal and sphenoid sinuses since comparison. 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 Cannot rule out CSF leak. Thinning of the left carotid canal wall along the posterior left sphenoid sinus. Cannot rule out dehiscence.  - Continue on Flonase and [place] BID   - MRI Sinuses - No definite evidence of CSF leak.  - ENT following - recs appreciated     # Nodular Liver with possible Cirrhosis ?   - During admission in April patient was found to have nodular contour of liver on US 4/6/2021. Upon further discussion with patient and Hepatology, she was noted to have history of blood transfusion in 1998 with (+) Hep C AB with negative Hep C RNA. She also reports history of CBD stricture in 1998 in [place], s/p PTC/PTBD.   - CLD work up previously noted with HBsAg negative, HBcIgM negative, Hep E IgG negative, Hep A IgM negative, RUPESH, Anti-LKM and AMA negative, Ferritin 169, AFP within normal limits, ASMA 1:40. MR/MRCP without stricturing or hepatic lesions. No ascites on all imaging and no history of HE. No known varices/ no EGD record on record.   - Echo - EF 73%, unremarkable   - Hepatology consulted, recs apreciated  - S/P liver bx 7/7; prelim results with evidence of hepatic fibrosis but no cirrhosis- f/u final results  - Liver bx with mild portal fibrosis   - Follow up in Hepatology Fellow clinic.  - MRCP  done- showing Cirrhotic morphology with no focal hepatic lesion.    # IDDM2, A1C 8.9 (6/2021)   - At home on: Lantus 14U QHS and sliding scale  - In house on: Lantus 15U QHS and Lispro 8U + Moderate ISS in house while on steroids.   - Will trend FS: adjustments will be made after evaluating dinner time FS if warranted     # Ethics/ DISPO/ GOC  - FULL CODE and GOC discussed with SonMichela ([phone]) who expresses wishes of DNI, however will discuss further for DNR. Family wishes for all medical treatment (including invasive and possible lung transplant) to be performed, however if her condition was to worsen they do not wish for intubation. GOC conversation ongoing.   - PT recommends home with home PT   - Awaiting approval for home NIV machine  - c/w AVAP as needed   - Will plan for J&J COVID19 vaccine on discharge date.    # DVT PPX -Lovenox sc

## 2021-07-16 NOTE — DISCHARGE NOTE NURSING/CASE MANAGEMENT/SOCIAL WORK - NSDCDMETYPESERV_GEN_ALL_CORE_FT
A  Home/Non Invasive Ventilator will be delivered & set up by the Respiratory Therapist from [employer]. They will teach you how to apply & operate the Machine.

## 2021-07-17 LAB
GLUCOSE BLDC GLUCOMTR-MCNC: 203 MG/DL — HIGH (ref 70–99)
GLUCOSE BLDC GLUCOMTR-MCNC: 227 MG/DL — HIGH (ref 70–99)
GLUCOSE BLDC GLUCOMTR-MCNC: 259 MG/DL — HIGH (ref 70–99)
GLUCOSE BLDC GLUCOMTR-MCNC: 85 MG/DL — SIGNIFICANT CHANGE UP (ref 70–99)

## 2021-07-17 PROCEDURE — 99233 SBSQ HOSP IP/OBS HIGH 50: CPT | Mod: GC

## 2021-07-17 RX ORDER — INSULIN LISPRO 100/ML
VIAL (ML) SUBCUTANEOUS AT BEDTIME
Refills: 0 | Status: DISCONTINUED | OUTPATIENT
Start: 2021-07-17 | End: 2021-07-19

## 2021-07-17 RX ORDER — INSULIN LISPRO 100/ML
VIAL (ML) SUBCUTANEOUS
Refills: 0 | Status: DISCONTINUED | OUTPATIENT
Start: 2021-07-17 | End: 2021-07-19

## 2021-07-17 RX ADMIN — BUDESONIDE AND FORMOTEROL FUMARATE DIHYDRATE 2 PUFF(S): 160; 4.5 AEROSOL RESPIRATORY (INHALATION) at 12:13

## 2021-07-17 RX ADMIN — Medication 325 MILLIGRAM(S): at 11:45

## 2021-07-17 RX ADMIN — BUDESONIDE AND FORMOTEROL FUMARATE DIHYDRATE 2 PUFF(S): 160; 4.5 AEROSOL RESPIRATORY (INHALATION) at 21:59

## 2021-07-17 RX ADMIN — INSULIN GLARGINE 15 UNIT(S): 100 INJECTION, SOLUTION SUBCUTANEOUS at 21:24

## 2021-07-17 RX ADMIN — AER TRAVELER 1 APPLICATION(S): 0.5 SOLUTION RECTAL; TOPICAL at 14:00

## 2021-07-17 RX ADMIN — AER TRAVELER 1 APPLICATION(S): 0.5 SOLUTION RECTAL; TOPICAL at 21:30

## 2021-07-17 RX ADMIN — Medication 8 UNIT(S): at 17:19

## 2021-07-17 RX ADMIN — Medication 8 UNIT(S): at 11:46

## 2021-07-17 RX ADMIN — Medication 500 MILLIGRAM(S): at 11:45

## 2021-07-17 RX ADMIN — MONTELUKAST 10 MILLIGRAM(S): 4 TABLET, CHEWABLE ORAL at 11:45

## 2021-07-17 RX ADMIN — SIMETHICONE 80 MILLIGRAM(S): 80 TABLET, CHEWABLE ORAL at 16:50

## 2021-07-17 RX ADMIN — PANTOPRAZOLE SODIUM 40 MILLIGRAM(S): 20 TABLET, DELAYED RELEASE ORAL at 06:15

## 2021-07-17 RX ADMIN — SODIUM CHLORIDE 3 MILLILITER(S): 9 INJECTION INTRAMUSCULAR; INTRAVENOUS; SUBCUTANEOUS at 12:13

## 2021-07-17 RX ADMIN — AMLODIPINE BESYLATE 5 MILLIGRAM(S): 2.5 TABLET ORAL at 06:17

## 2021-07-17 RX ADMIN — SENNA PLUS 2 TABLET(S): 8.6 TABLET ORAL at 21:19

## 2021-07-17 RX ADMIN — Medication 3: at 17:19

## 2021-07-17 RX ADMIN — Medication 2 SPRAY(S): at 06:15

## 2021-07-17 RX ADMIN — ENOXAPARIN SODIUM 40 MILLIGRAM(S): 100 INJECTION SUBCUTANEOUS at 11:46

## 2021-07-17 RX ADMIN — Medication 2: at 11:46

## 2021-07-17 RX ADMIN — SODIUM CHLORIDE 3 MILLILITER(S): 9 INJECTION INTRAMUSCULAR; INTRAVENOUS; SUBCUTANEOUS at 16:24

## 2021-07-17 RX ADMIN — Medication 3 MILLILITER(S): at 21:58

## 2021-07-17 RX ADMIN — CHLORHEXIDINE GLUCONATE 1 APPLICATION(S): 213 SOLUTION TOPICAL at 11:45

## 2021-07-17 RX ADMIN — Medication 3 MILLILITER(S): at 12:14

## 2021-07-17 RX ADMIN — MIRTAZAPINE 7.5 MILLIGRAM(S): 45 TABLET, ORALLY DISINTEGRATING ORAL at 21:18

## 2021-07-17 RX ADMIN — SODIUM CHLORIDE 3 MILLILITER(S): 9 INJECTION INTRAMUSCULAR; INTRAVENOUS; SUBCUTANEOUS at 21:58

## 2021-07-17 RX ADMIN — Medication 3 MILLILITER(S): at 03:37

## 2021-07-17 RX ADMIN — SODIUM CHLORIDE 3 MILLILITER(S): 9 INJECTION INTRAMUSCULAR; INTRAVENOUS; SUBCUTANEOUS at 03:36

## 2021-07-17 RX ADMIN — Medication 2 SPRAY(S): at 17:20

## 2021-07-17 RX ADMIN — Medication 8 UNIT(S): at 08:20

## 2021-07-17 RX ADMIN — FAMOTIDINE 40 MILLIGRAM(S): 10 INJECTION INTRAVENOUS at 21:19

## 2021-07-17 RX ADMIN — AER TRAVELER 1 APPLICATION(S): 0.5 SOLUTION RECTAL; TOPICAL at 06:00

## 2021-07-17 RX ADMIN — Medication 3 MILLILITER(S): at 16:24

## 2021-07-17 RX ADMIN — Medication 40 MILLIGRAM(S): at 06:16

## 2021-07-17 NOTE — PROGRESS NOTE ADULT - GASTROINTESTINAL DETAILS
soft/no distention
soft/nontender
soft/no distention
soft/no distention
soft/nontender
soft/nontender
soft/no distention
soft/nontender/no distention
soft/nontender/no distention
soft/no distention
soft/no distention

## 2021-07-17 NOTE — PROGRESS NOTE ADULT - CVS HE PE MLT D E PC
regular rate and rhythm/no rub
regular rate and rhythm
regular rate and rhythm/no rub
regular rate and rhythm/no rub
regular rate and rhythm
regular rate and rhythm/no rub
regular rate and rhythm
regular rate and rhythm
regular rate and rhythm/no rub
regular rate and rhythm
regular rate and rhythm

## 2021-07-17 NOTE — PROGRESS NOTE ADULT - EXTREMITIES
detailed exam
No cyanosis, clubbing or edema
detailed exam
No cyanosis, clubbing or edema
No cyanosis, clubbing or edema
detailed exam
detailed exam
No cyanosis, clubbing or edema
No cyanosis, clubbing or edema
detailed exam
detailed exam

## 2021-07-17 NOTE — PROGRESS NOTE ADULT - ATTENDING COMMENTS
Pt with diffuse cystic bronchiectasis and primary ciliary dyskinesia, ABPA here with acute on chronic hypoxic and hypercapnic respiratory failure due to pseudomonas /bronchiectasis exacerbation, improving.    - c/w avaps qhs - machine unable to be delivered overnight  - daytime O2 NC  - c/w steroids, will start slow taper in AM  - will hold off on covid vaccine for now if she is still requiring high doses of steroids as unclear if she will mount proper antibody response

## 2021-07-17 NOTE — PROGRESS NOTE ADULT - SUBJECTIVE AND OBJECTIVE BOX
CHIEF COMPLAINT: Patient is a 66y old Female who presents with a chief complaint of SOB.    Interval Events:    REVIEW OF SYSTEMS:  [ ] All other systems negative  [ ] Unable to assess ROS because ________    OBJECTIVE:  ICU Vital Signs Last 24 Hrs  T(C): 36.8 (17 Jul 2021 06:46), Max: 36.9 (16 Jul 2021 14:07)  T(F): 98.2 (17 Jul 2021 06:46), Max: 98.5 (16 Jul 2021 14:07)  HR: 77 (17 Jul 2021 06:46) (75 - 95)  BP: 135/77 (17 Jul 2021 06:46) (134/68 - 143/64)  RR: 18 (17 Jul 2021 06:46) (18 - 18)  SpO2: 96% (17 Jul 2021 06:46) (95% - 100%)    Mode: NIV (Noninvasive Ventilation), RR (machine): 18, TV (machine): 380, FiO2: 30, PEEP: 5, ITime: 1, PIP: 22    CAPILLARY BLOOD GLUCOSE    POCT Blood Glucose.: 340 mg/dL (16 Jul 2021 21:16)    HOSPITAL MEDICATIONS:  MEDICATIONS  (STANDING):  albuterol/ipratropium for Nebulization 3 milliLiter(s) Nebulizer every 6 hours  amLODIPine   Tablet 5 milliGRAM(s) Oral daily  ascorbic acid 500 milliGRAM(s) Oral daily  budesonide 160 MICROgram(s)/formoterol 4.5 MICROgram(s) Inhaler 2 Puff(s) Inhalation two times a day  chlorhexidine 4% Liquid 1 Application(s) Topical daily  coronavirus (EUA) Vaccine ([email or web address]) 0.5 milliLiter(s) IntraMuscular once  dextrose 40% Gel 15 Gram(s) Oral once  dextrose 5%. 1000 milliLiter(s) (50 mL/Hr) IV Continuous <Continuous>  dextrose 5%. 1000 milliLiter(s) (100 mL/Hr) IV Continuous <Continuous>  dextrose 50% Injectable 25 Gram(s) IV Push once  dextrose 50% Injectable 12.5 Gram(s) IV Push once  dextrose 50% Injectable 25 Gram(s) IV Push once  enoxaparin Injectable 40 milliGRAM(s) SubCutaneous daily  famotidine    Tablet 40 milliGRAM(s) Oral at bedtime  ferrous    sulfate 325 milliGRAM(s) Oral daily  fluticasone propionate 50 MICROgram(s)/spray Nasal Spray 2 Spray(s) Both Nostrils two times a day  glucagon  Injectable 1 milliGRAM(s) IntraMuscular once  insulin glargine Injectable (LANTUS) 15 Unit(s) SubCutaneous at bedtime  insulin lispro (ADMELOG) corrective regimen sliding scale   SubCutaneous three times a day before meals  insulin lispro (ADMELOG) corrective regimen sliding scale   SubCutaneous at bedtime  insulin lispro Injectable (ADMELOG) 8 Unit(s) SubCutaneous three times a day before meals  mirtazapine 7.5 milliGRAM(s) Oral at bedtime  montelukast 10 milliGRAM(s) Oral daily  pantoprazole    Tablet 40 milliGRAM(s) Oral before breakfast  predniSONE   Tablet 40 milliGRAM(s) Oral daily  senna 2 Tablet(s) Oral at bedtime  sodium chloride 0.65% Nasal 2 Spray(s) Both Nostrils every 12 hours  sodium chloride 3%  Inhalation 3 milliLiter(s) Inhalation every 6 hours  witch hazel Pads 1 Application(s) Topical three times a day    MEDICATIONS  (PRN):  acetaminophen   Tablet .. 650 milliGRAM(s) Oral every 6 hours PRN Temp greater or equal to 38C (100.4F)  acetaminophen   Tablet .. 650 milliGRAM(s) Oral every 4 hours PRN Mild Pain (1 - 3)  acetaminophen   Tablet .. 650 milliGRAM(s) Oral every 12 hours PRN Mild Pain (1 - 3), Moderate Pain (4 - 6)  guaiFENesin Oral Liquid (Sugar-Free) 100 milliGRAM(s) Oral every 6 hours PRN Cough  simethicone 80 milliGRAM(s) Chew three times a day PRN Gas      LABS:          MICROBIOLOGY:     RADIOLOGY:  [ ] Reviewed and interpreted by me    PULMONARY FUNCTION TESTS:    EKG: CHIEF COMPLAINT: Patient is a 66y old Female who presents with a chief complaint of SOB.    Interval Events: No interval events noted overnight.    REVIEW OF SYSTEMS:  SOB on exertion. Denies fever, CP, abd pain, N/V  [x] All other systems negative    OBJECTIVE:  ICU Vital Signs Last 24 Hrs  T(C): 36.8 (17 Jul 2021 06:46), Max: 36.9 (16 Jul 2021 14:07)  T(F): 98.2 (17 Jul 2021 06:46), Max: 98.5 (16 Jul 2021 14:07)  HR: 77 (17 Jul 2021 06:46) (75 - 95)  BP: 135/77 (17 Jul 2021 06:46) (134/68 - 143/64)  RR: 18 (17 Jul 2021 06:46) (18 - 18)  SpO2: 96% (17 Jul 2021 06:46) (95% - 100%)    Mode: NIV (Noninvasive Ventilation), RR (machine): 18, TV (machine): 380, FiO2: 30, PEEP: 5, ITime: 1, PIP: 22    CAPILLARY BLOOD GLUCOSE    POCT Blood Glucose.: 340 mg/dL (16 Jul 2021 21:16)    HOSPITAL MEDICATIONS:  MEDICATIONS  (STANDING):  albuterol/ipratropium for Nebulization 3 milliLiter(s) Nebulizer every 6 hours  amLODIPine   Tablet 5 milliGRAM(s) Oral daily  ascorbic acid 500 milliGRAM(s) Oral daily  budesonide 160 MICROgram(s)/formoterol 4.5 MICROgram(s) Inhaler 2 Puff(s) Inhalation two times a day  chlorhexidine 4% Liquid 1 Application(s) Topical daily  coronavirus (EUA) Vaccine ([employer]) 0.5 milliLiter(s) IntraMuscular once  dextrose 40% Gel 15 Gram(s) Oral once  dextrose 5%. 1000 milliLiter(s) (50 mL/Hr) IV Continuous <Continuous>  dextrose 5%. 1000 milliLiter(s) (100 mL/Hr) IV Continuous <Continuous>  dextrose 50% Injectable 25 Gram(s) IV Push once  dextrose 50% Injectable 12.5 Gram(s) IV Push once  dextrose 50% Injectable 25 Gram(s) IV Push once  enoxaparin Injectable 40 milliGRAM(s) SubCutaneous daily  famotidine    Tablet 40 milliGRAM(s) Oral at bedtime  ferrous    sulfate 325 milliGRAM(s) Oral daily  fluticasone propionate 50 MICROgram(s)/spray Nasal Spray 2 Spray(s) Both Nostrils two times a day  glucagon  Injectable 1 milliGRAM(s) IntraMuscular once  insulin glargine Injectable (LANTUS) 15 Unit(s) SubCutaneous at bedtime  insulin lispro (ADMELOG) corrective regimen sliding scale   SubCutaneous three times a day before meals  insulin lispro (ADMELOG) corrective regimen sliding scale   SubCutaneous at bedtime  insulin lispro Injectable (ADMELOG) 8 Unit(s) SubCutaneous three times a day before meals  mirtazapine 7.5 milliGRAM(s) Oral at bedtime  montelukast 10 milliGRAM(s) Oral daily  pantoprazole    Tablet 40 milliGRAM(s) Oral before breakfast  predniSONE   Tablet 40 milliGRAM(s) Oral daily  senna 2 Tablet(s) Oral at bedtime  sodium chloride 0.65% Nasal 2 Spray(s) Both Nostrils every 12 hours  sodium chloride 3%  Inhalation 3 milliLiter(s) Inhalation every 6 hours  witch hazel Pads 1 Application(s) Topical three times a day    MEDICATIONS  (PRN):  acetaminophen   Tablet .. 650 milliGRAM(s) Oral every 6 hours PRN Temp greater or equal to 38C (100.4F)  acetaminophen   Tablet .. 650 milliGRAM(s) Oral every 4 hours PRN Mild Pain (1 - 3)  acetaminophen   Tablet .. 650 milliGRAM(s) Oral every 12 hours PRN Mild Pain (1 - 3), Moderate Pain (4 - 6)  guaiFENesin Oral Liquid (Sugar-Free) 100 milliGRAM(s) Oral every 6 hours PRN Cough  simethicone 80 milliGRAM(s) Chew three times a day PRN Gas      LABS:      MICROBIOLOGY:     RADIOLOGY:  [ ] Reviewed and interpreted by me    PULMONARY FUNCTION TESTS:    EKG:te

## 2021-07-17 NOTE — PROGRESS NOTE ADULT - ADDITIONAL PE
A&Ox4  Denies SOB
A&Ox4  Follows commands and able to move all extremities
A&Ox4  Able to move all extremities
A&Ox4  Able to move all extremities.

## 2021-07-17 NOTE — PROGRESS NOTE ADULT - CONSTITUTIONAL DETAILS
With coughing/respiratory distress
respiratory distress
well-groomed/no distress
respiratory distress

## 2021-07-17 NOTE — PROGRESS NOTE ADULT - ASSESSMENT
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 Patient now presented from Pulmonary Office, Dr. Young for worsening SOB, lethargy and cough with thick yellow secretions. Now readmitted for acute on chronic hypercarbic respiratory failure likely in setting of bronchiectasis exacerbation with recurrent PNA.     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   - CT CHEST 6/30 with (+) bronchiectasis   - SCx 6/30 with Pseudomonas Aeurginosa  - Continue on aggressive chest PT with Mucomyst, Duoneb and Chest Vest Q6H   - Continue on Symbicort BID and Singular QD   - s/p Prednisone 40mg QD (6/30 - 7/3)   - s/p Avycaz/ NELLY (6/30 - 7/2) and Cefepime (7/2 - 7/14); Can dc on levaquin  - Hypercarbia improved and now continued on NC QD and AVAPs QHS.   - Has outpatient f/u appt with Dr. Young on 7/15 per Chart Review for possible Lung transplant  - Fever to 101 wbc increased to 18, CX sent , Scx + moderate gm+ cocci in pairs - Blood cx NGTD  - Awaiting insurance approval for NIV at home     # Acute on Chronic Sinusitis   - CT SINUS 6/30 with extensive inflammatory changes throughout the paranasal sinuses and their respective drainage pathways, , improved in the right frontal and sphenoid sinuses since comparison. 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 Cannot rule out CSF leak. Thinning of the left carotid canal wall along the posterior left sphenoid sinus. Cannot rule out dehiscence.  - Continue on Flonase and [place] BID   - MRI Sinuses - No definite evidence of CSF leak.  - ENT following - recs appreciated     # Nodular Liver with possible Cirrhosis ?   - During admission in April patient was found to have nodular contour of liver on US 4/6/2021. Upon further discussion with patient and Hepatology, she was noted to have history of blood transfusion in 1998 with (+) Hep C AB with negative Hep C RNA. She also reports history of CBD stricture in 1998 in [place], s/p PTC/PTBD.   - CLD work up previously noted with HBsAg negative, HBcIgM negative, Hep E IgG negative, Hep A IgM negative, RUPESH, Anti-LKM and AMA negative, Ferritin 169, AFP within normal limits, ASMA 1:40. MR/MRCP without stricturing or hepatic lesions. No ascites on all imaging and no history of HE. No known varices/ no EGD record on record.   - Echo - EF 73%, unremarkable   - Hepatology consulted, recs apreciated  - S/P liver bx 7/7; prelim results with evidence of hepatic fibrosis but no cirrhosis- f/u final results  - Liver bx with mild portal fibrosis   - Follow up in Hepatology Fellow clinic.  - MRCP  done- showing Cirrhotic morphology with no focal hepatic lesion.    # IDDM2, A1C 8.9 (6/2021)   - At home on: Lantus 14U QHS and sliding scale  - In house on: Lantus 15U QHS and Lispro 8U + Moderate ISS in house while on steroids.   - Will trend FS: adjustments will be made after evaluating dinner time FS if warranted     # Ethics/ DISPO/ GOC  - FULL CODE and GOC discussed with SonMichela ([phone]) who expresses wishes of DNI, however will discuss further for DNR. Family wishes for all medical treatment (including invasive and possible lung transplant) to be performed, however if her condition was to worsen they do not wish for intubation. GOC conversation ongoing.   - PT recommends home with home PT   - Awaiting approval for home NIV machine  - c/w AVAP as needed   - Will plan for [employer] COVID19 vaccine on discharge date.    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 Patient now presented from Pulmonary Office, Dr. Young for worsening SOB, lethargy and cough with thick yellow secretions. Now readmitted for acute on chronic hypercarbic respiratory failure likely in setting of bronchiectasis exacerbation with recurrent PNA.     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   - CT CHEST 6/30 with (+) bronchiectasis   - SCx 6/30 with Pseudomonas Aeurginosa  - Continue on aggressive chest PT with Mucomyst, Duoneb and Chest Vest Q6H   - Continue on Symbicort BID and Singular QD   - s/p Prednisone 40mg QD (6/30 - 7/3)   - s/p Avycaz/ NELLY (6/30 - 7/2) and Cefepime (7/2 - 7/14); Can dc on levaquin  - Hypercarbia improved and now continued on NC QD and AVAPs QHS.   - Has outpatient f/u appt with Dr. Young on 7/15 per Chart Review for possible Lung transplant  - Fever to 101 wbc increased to 18, CX sent , Scx + moderate gm+ cocci in pairs - Blood cx NGTD  - Awaiting insurance approval for NIV at home     # Acute on Chronic Sinusitis   - CT SINUS 6/30 with extensive inflammatory changes throughout the paranasal sinuses and their respective drainage pathways, , improved in the right frontal and sphenoid sinuses since comparison. 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 Cannot rule out CSF leak. Thinning of the left carotid canal wall along the posterior left sphenoid sinus. Cannot rule out dehiscence.  - Continue on Flonase and [place] BID   - MRI Sinuses - No definite evidence of CSF leak.  - ENT following - recs appreciated     # Nodular Liver with possible Cirrhosis ?   - During admission in April patient was found to have nodular contour of liver on US 4/6/2021. Upon further discussion with patient and Hepatology, she was noted to have history of blood transfusion in 1998 with (+) Hep C AB with negative Hep C RNA. She also reports history of CBD stricture in 1998 in [place], s/p PTC/PTBD.   - CLD work up previously noted with HBsAg negative, HBcIgM negative, Hep E IgG negative, Hep A IgM negative, RUPESH, Anti-LKM and AMA negative, Ferritin 169, AFP within normal limits, ASMA 1:40. MR/MRCP without stricturing or hepatic lesions. No ascites on all imaging and no history of HE. No known varices/ no EGD record on record.   - Echo - EF 73%, unremarkable   - Hepatology consulted, recs apreciated  - S/P liver bx 7/7; prelim results with evidence of hepatic fibrosis but no cirrhosis- f/u final results  - Liver bx with mild portal fibrosis   - Follow up in [facility]atology Fellow clinic.  - MRCP  done- showing Cirrhotic morphology with no focal hepatic lesion.    # IDDM2, A1C 8.9 (6/2021)   - At home on: Lantus 14U QHS and sliding scale  - In house on: Lantus 15U QHS and Lispro 8U + Moderate ISS in house while on steroids.   - Will trend FS: adjustments will be made after evaluating dinner time FS if warranted     # Ethics/ DISPO/ GOC  - FULL CODE and GOC discussed with SonMichela ([phone]) who expresses wishes of DNI, however will discuss further for DNR. Family wishes for all medical treatment (including invasive and possible lung transplant) to be performed, however if her condition was to worsen they do not wish for intubation. GOC conversation ongoing.   - PT recommends home with home PT   - Awaiting approval for home NIV machine  - c/w AVAP as needed   - Hold off on COVID19 vaccine as patient is on steroids and will reduce antibody response.    # DVT PPX -Lovenox sc

## 2021-07-17 NOTE — PROGRESS NOTE ADULT - RS GEN PE MLT RESP DETAILS PC
airway patent/breath sounds equal/good air movement
airway patent/breath sounds equal/good air movement
Bilat wheezing with loose cough/airway patent/breath sounds equal
bilat coarse bs/airway patent/breath sounds equal
bilat coarse bs/airway patent/breath sounds equal
bilat wheezing/airway patent/breath sounds equal
airway patent/breath sounds equal
airway patent/breath sounds equal/rhonchi
airway patent/breath sounds equal/good air movement
airway patent/breath sounds equal/good air movement
coarse bilat bs/airway patent/breath sounds equal

## 2021-07-18 LAB
GLUCOSE BLDC GLUCOMTR-MCNC: 109 MG/DL — HIGH (ref 70–99)
GLUCOSE BLDC GLUCOMTR-MCNC: 150 MG/DL — HIGH (ref 70–99)
GLUCOSE BLDC GLUCOMTR-MCNC: 237 MG/DL — HIGH (ref 70–99)
GLUCOSE BLDC GLUCOMTR-MCNC: 281 MG/DL — HIGH (ref 70–99)
GLUCOSE BLDC GLUCOMTR-MCNC: 76 MG/DL — SIGNIFICANT CHANGE UP (ref 70–99)

## 2021-07-18 PROCEDURE — 99233 SBSQ HOSP IP/OBS HIGH 50: CPT | Mod: GC

## 2021-07-18 RX ORDER — NYSTATIN CREAM 100000 [USP'U]/G
1 CREAM TOPICAL
Refills: 0 | Status: DISCONTINUED | OUTPATIENT
Start: 2021-07-18 | End: 2021-07-19

## 2021-07-18 RX ADMIN — Medication 3 MILLILITER(S): at 10:42

## 2021-07-18 RX ADMIN — Medication 2 SPRAY(S): at 05:38

## 2021-07-18 RX ADMIN — AER TRAVELER 1 APPLICATION(S): 0.5 SOLUTION RECTAL; TOPICAL at 14:00

## 2021-07-18 RX ADMIN — Medication 8 UNIT(S): at 17:33

## 2021-07-18 RX ADMIN — Medication 325 MILLIGRAM(S): at 12:09

## 2021-07-18 RX ADMIN — Medication 2 SPRAY(S): at 17:29

## 2021-07-18 RX ADMIN — AER TRAVELER 1 APPLICATION(S): 0.5 SOLUTION RECTAL; TOPICAL at 21:05

## 2021-07-18 RX ADMIN — Medication 2 SPRAY(S): at 05:39

## 2021-07-18 RX ADMIN — SODIUM CHLORIDE 3 MILLILITER(S): 9 INJECTION INTRAMUSCULAR; INTRAVENOUS; SUBCUTANEOUS at 21:00

## 2021-07-18 RX ADMIN — SODIUM CHLORIDE 3 MILLILITER(S): 9 INJECTION INTRAMUSCULAR; INTRAVENOUS; SUBCUTANEOUS at 03:52

## 2021-07-18 RX ADMIN — ENOXAPARIN SODIUM 40 MILLIGRAM(S): 100 INJECTION SUBCUTANEOUS at 12:10

## 2021-07-18 RX ADMIN — MIRTAZAPINE 7.5 MILLIGRAM(S): 45 TABLET, ORALLY DISINTEGRATING ORAL at 21:09

## 2021-07-18 RX ADMIN — BUDESONIDE AND FORMOTEROL FUMARATE DIHYDRATE 2 PUFF(S): 160; 4.5 AEROSOL RESPIRATORY (INHALATION) at 20:57

## 2021-07-18 RX ADMIN — Medication 3 MILLILITER(S): at 03:52

## 2021-07-18 RX ADMIN — SODIUM CHLORIDE 3 MILLILITER(S): 9 INJECTION INTRAMUSCULAR; INTRAVENOUS; SUBCUTANEOUS at 16:12

## 2021-07-18 RX ADMIN — SIMETHICONE 80 MILLIGRAM(S): 80 TABLET, CHEWABLE ORAL at 15:11

## 2021-07-18 RX ADMIN — INSULIN GLARGINE 15 UNIT(S): 100 INJECTION, SOLUTION SUBCUTANEOUS at 21:05

## 2021-07-18 RX ADMIN — Medication 2: at 17:33

## 2021-07-18 RX ADMIN — Medication 8 UNIT(S): at 08:39

## 2021-07-18 RX ADMIN — NYSTATIN CREAM 1 APPLICATION(S): 100000 CREAM TOPICAL at 17:30

## 2021-07-18 RX ADMIN — SENNA PLUS 2 TABLET(S): 8.6 TABLET ORAL at 21:10

## 2021-07-18 RX ADMIN — Medication 8 UNIT(S): at 12:08

## 2021-07-18 RX ADMIN — Medication 3 MILLILITER(S): at 20:57

## 2021-07-18 RX ADMIN — Medication 500 MILLIGRAM(S): at 12:09

## 2021-07-18 RX ADMIN — Medication 30 MILLIGRAM(S): at 05:41

## 2021-07-18 RX ADMIN — MONTELUKAST 10 MILLIGRAM(S): 4 TABLET, CHEWABLE ORAL at 12:09

## 2021-07-18 RX ADMIN — AER TRAVELER 1 APPLICATION(S): 0.5 SOLUTION RECTAL; TOPICAL at 05:39

## 2021-07-18 RX ADMIN — PANTOPRAZOLE SODIUM 40 MILLIGRAM(S): 20 TABLET, DELAYED RELEASE ORAL at 05:38

## 2021-07-18 RX ADMIN — CHLORHEXIDINE GLUCONATE 1 APPLICATION(S): 213 SOLUTION TOPICAL at 12:15

## 2021-07-18 RX ADMIN — SODIUM CHLORIDE 3 MILLILITER(S): 9 INJECTION INTRAMUSCULAR; INTRAVENOUS; SUBCUTANEOUS at 10:42

## 2021-07-18 RX ADMIN — BUDESONIDE AND FORMOTEROL FUMARATE DIHYDRATE 2 PUFF(S): 160; 4.5 AEROSOL RESPIRATORY (INHALATION) at 10:43

## 2021-07-18 RX ADMIN — AMLODIPINE BESYLATE 5 MILLIGRAM(S): 2.5 TABLET ORAL at 05:38

## 2021-07-18 RX ADMIN — FAMOTIDINE 40 MILLIGRAM(S): 10 INJECTION INTRAVENOUS at 21:09

## 2021-07-18 RX ADMIN — Medication 3 MILLILITER(S): at 16:12

## 2021-07-18 NOTE — PROGRESS NOTE ADULT - ATTENDING COMMENTS
Pt with diffuse cystic bronchiectasis and primary ciliary dyskinesia, ABPA here with acute on chronic hypoxic and hypercapnic respiratory failure due to pseudomonas /bronchiectasis exacerbation, improving.    - c/w avaps qhs - machine unable to be delivered overnight  - daytime O2 NC  - c/w steroids, will start slow taper in AM  - will hold off on covid vaccine for now if she is still requiring high doses of steroids as unclear if she will mount proper antibody response Pt with diffuse cystic bronchiectasis and primary ciliary dyskinesia, ABPA here with acute on chronic hypoxic and hypercapnic respiratory failure due to pseudomonas /bronchiectasis exacerbation, improving.  Without complaints this AM.    - c/w avaps qhs - machine unable to be delivered this weekend  - daytime O2 NC  - c/w steroids, will start slow taper in AM  - continue to hold off on covid vaccine for now if she is still requiring high doses of steroids as unclear if she will mount proper antibody response Pt with diffuse cystic bronchiectasis and primary ciliary dyskinesia, ABPA here with acute on chronic hypoxic and hypercapnic respiratory failure due to pseudomonas /bronchiectasis exacerbation, improving.  Without complaints this AM.    - c/w avaps qhs - machine unable to be delivered this weekend  - daytime O2 NC  - c/w steroids, continue to steroid taper  - continue to hold off on covid vaccine for now if she is still requiring high doses of steroids as unclear if she will mount proper antibody response

## 2021-07-18 NOTE — PROGRESS NOTE ADULT - TIME BILLING
reviewing chart and coordinating care with team
reviewing chart and coordinating care with primary team/staff
reviewing chart and coordinating care with team
reviewing chart and coordinating care with primary team/staff

## 2021-07-18 NOTE — PROGRESS NOTE ADULT - SUBJECTIVE AND OBJECTIVE BOX
CHIEF COMPLAINT: Patient is a 66y old Female who presents with a chief complaint of SOB.    Interval Events: No interval events noted overnight.    REVIEW OF SYSTEMS:  SOB on exertion. Denies fever, CP, abd pain, N/V  [x] All other systems negative    ICU Vital Signs Last 24 Hrs  T(C): 36.8 (18 Jul 2021 05:38), Max: 36.8 (18 Jul 2021 05:38)  T(F): 98.2 (18 Jul 2021 05:38), Max: 98.2 (18 Jul 2021 05:38)  HR: 80 (18 Jul 2021 05:38) (66 - 88)  BP: 128/72 (18 Jul 2021 05:38) (124/66 - 128/74)  BP(mean): --  ABP: --  ABP(mean): --  RR: 18 (18 Jul 2021 05:38) (17 - 18)  SpO2: 98% (18 Jul 2021 05:38) (98% - 100%)      Mode: NIV (Noninvasive Ventilation), RR (machine): 18, TV (machine): 380, FiO2: 30, PEEP: 5, ITime: 1, PIP: 22    CAPILLARY BLOOD GLUCOSE        POCT Blood Glucose.: 227 mg/dL (17 Jul 2021 21:21)    HOSPITAL MEDICATIONS:  MEDICATIONS  (STANDING):  albuterol/ipratropium for Nebulization 3 milliLiter(s) Nebulizer every 6 hours  amLODIPine   Tablet 5 milliGRAM(s) Oral daily  ascorbic acid 500 milliGRAM(s) Oral daily  budesonide 160 MICROgram(s)/formoterol 4.5 MICROgram(s) Inhaler 2 Puff(s) Inhalation two times a day  chlorhexidine 4% Liquid 1 Application(s) Topical daily  coronavirus (EUA) Vaccine ([employer]) 0.5 milliLiter(s) IntraMuscular once  dextrose 40% Gel 15 Gram(s) Oral once  dextrose 5%. 1000 milliLiter(s) (50 mL/Hr) IV Continuous <Continuous>  dextrose 5%. 1000 milliLiter(s) (100 mL/Hr) IV Continuous <Continuous>  dextrose 50% Injectable 25 Gram(s) IV Push once  dextrose 50% Injectable 12.5 Gram(s) IV Push once  dextrose 50% Injectable 25 Gram(s) IV Push once  enoxaparin Injectable 40 milliGRAM(s) SubCutaneous daily  famotidine    Tablet 40 milliGRAM(s) Oral at bedtime  ferrous    sulfate 325 milliGRAM(s) Oral daily  fluticasone propionate 50 MICROgram(s)/spray Nasal Spray 2 Spray(s) Both Nostrils two times a day  glucagon  Injectable 1 milliGRAM(s) IntraMuscular once  insulin glargine Injectable (LANTUS) 15 Unit(s) SubCutaneous at bedtime  insulin lispro (ADMELOG) corrective regimen sliding scale   SubCutaneous three times a day before meals  insulin lispro (ADMELOG) corrective regimen sliding scale   SubCutaneous at bedtime  insulin lispro Injectable (ADMELOG) 8 Unit(s) SubCutaneous three times a day before meals  mirtazapine 7.5 milliGRAM(s) Oral at bedtime  montelukast 10 milliGRAM(s) Oral daily  pantoprazole    Tablet 40 milliGRAM(s) Oral before breakfast  predniSONE   Tablet 40 milliGRAM(s) Oral daily  senna 2 Tablet(s) Oral at bedtime  sodium chloride 0.65% Nasal 2 Spray(s) Both Nostrils every 12 hours  sodium chloride 3%  Inhalation 3 milliLiter(s) Inhalation every 6 hours  witch hazel Pads 1 Application(s) Topical three times a day    MEDICATIONS  (PRN):  acetaminophen   Tablet .. 650 milliGRAM(s) Oral every 6 hours PRN Temp greater or equal to 38C (100.4F)  acetaminophen   Tablet .. 650 milliGRAM(s) Oral every 4 hours PRN Mild Pain (1 - 3)  acetaminophen   Tablet .. 650 milliGRAM(s) Oral every 12 hours PRN Mild Pain (1 - 3), Moderate Pain (4 - 6)  guaiFENesin Oral Liquid (Sugar-Free) 100 milliGRAM(s) Oral every 6 hours PRN Cough  simethicone 80 milliGRAM(s) Chew three times a day PRN Gas      LABS:      MICROBIOLOGY:     RADIOLOGY:  [ ] Reviewed and interpreted by me    PULMONARY FUNCTION TESTS:    EKG:te

## 2021-07-18 NOTE — PROGRESS NOTE ADULT - ASSESSMENT
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 Patient now presented from Pulmonary Office, Dr. Young for worsening SOB, lethargy and cough with thick yellow secretions. Now readmitted for acute on chronic hypercarbic respiratory failure likely in setting of bronchiectasis exacerbation with recurrent PNA.     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   - CT CHEST 6/30 with (+) bronchiectasis   - SCx 6/30 with Pseudomonas Aeurginosa  - Continue on aggressive chest PT with Mucomyst, Duoneb and Chest Vest Q6H   - Continue on Symbicort BID and Singular QD   - s/p Prednisone 40mg QD (6/30 - 7/3)   - s/p Avycaz/ NELLY (6/30 - 7/2) and Cefepime (7/2 - 7/14); Can dc on levaquin  - Hypercarbia improved and now continued on NC QD and AVAPs QHS.   - Has outpatient f/u appt with Dr. Young on 7/15 per Chart Review for possible Lung transplant  - Fever to 101 wbc increased to 18, CX sent , Scx + moderate gm+ cocci in pairs - Blood cx NGTD  - Awaiting insurance approval for NIV at home     # Acute on Chronic Sinusitis   - CT SINUS 6/30 with extensive inflammatory changes throughout the paranasal sinuses and their respective drainage pathways, , improved in the right frontal and sphenoid sinuses since comparison. 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 Cannot rule out CSF leak. Thinning of the left carotid canal wall along the posterior left sphenoid sinus. Cannot rule out dehiscence.  - Continue on Flonase and [place] BID   - MRI Sinuses - No definite evidence of CSF leak.  - ENT following - recs appreciated     # Nodular Liver with possible Cirrhosis ?   - During admission in April patient was found to have nodular contour of liver on US 4/6/2021. Upon further discussion with patient and Hepatology, she was noted to have history of blood transfusion in 1998 with (+) Hep C AB with negative Hep C RNA. She also reports history of CBD stricture in 1998 in [place], s/p PTC/PTBD.   - CLD work up previously noted with HBsAg negative, HBcIgM negative, Hep E IgG negative, Hep A IgM negative, RUPESH, Anti-LKM and AMA negative, Ferritin 169, AFP within normal limits, ASMA 1:40. MR/MRCP without stricturing or hepatic lesions. No ascites on all imaging and no history of HE. No known varices/ no EGD record on record.   - Echo - EF 73%, unremarkable   - Hepatology consulted, recs apreciated  - S/P liver bx 7/7; prelim results with evidence of hepatic fibrosis but no cirrhosis- f/u final results  - Liver bx with mild portal fibrosis   - Follow up in Hepatology Fellow clinic.  - MRCP  done- showing Cirrhotic morphology with no focal hepatic lesion.    # IDDM2, A1C 8.9 (6/2021)   - At home on: Lantus 14U QHS and sliding scale  - In house on: Lantus 15U QHS and Lispro 8U + Moderate ISS in house while on steroids.   - Will trend FS: adjustments will be made after evaluating dinner time FS if warranted     # Ethics/ DISPO/ GOC  - FULL CODE and GOC discussed with SonMichela ([phone]) who expresses wishes of DNI, however will discuss further for DNR. Family wishes for all medical treatment (including invasive and possible lung transplant) to be performed, however if her condition was to worsen they do not wish for intubation. GOC conversation ongoing.   - PT recommends home with home PT   - Awaiting approval for home NIV machine  - c/w AVAP as needed   - Hold off on COVID19 vaccine as patient is on steroids and will reduce antibody response.    # DVT PPX -Lovenox sc

## 2021-07-19 VITALS — OXYGEN SATURATION: 97 %

## 2021-07-19 DIAGNOSIS — J96.02 ACUTE RESPIRATORY FAILURE WITH HYPERCAPNIA: ICD-10-CM

## 2021-07-19 LAB
GLUCOSE BLDC GLUCOMTR-MCNC: 118 MG/DL — HIGH (ref 70–99)
GLUCOSE BLDC GLUCOMTR-MCNC: 163 MG/DL — HIGH (ref 70–99)
GLUCOSE BLDC GLUCOMTR-MCNC: 263 MG/DL — HIGH (ref 70–99)

## 2021-07-19 PROCEDURE — 99233 SBSQ HOSP IP/OBS HIGH 50: CPT | Mod: GC

## 2021-07-19 PROCEDURE — 93306 TTE W/DOPPLER COMPLETE: CPT | Mod: 26

## 2021-07-19 RX ORDER — INSULIN LISPRO 100/ML
6 VIAL (ML) SUBCUTANEOUS
Qty: 0 | Refills: 0 | DISCHARGE
Start: 2021-07-19 | End: 2021-08-17

## 2021-07-19 RX ORDER — INSULIN LISPRO 100/ML
8 VIAL (ML) SUBCUTANEOUS
Qty: 15 | Refills: 0
Start: 2021-07-19 | End: 2021-08-17

## 2021-07-19 RX ORDER — INSULIN GLARGINE 100 [IU]/ML
15 INJECTION, SOLUTION SUBCUTANEOUS
Qty: 15 | Refills: 0
Start: 2021-07-19 | End: 2021-08-17

## 2021-07-19 RX ORDER — INSULIN LISPRO 100/ML
2 VIAL (ML) SUBCUTANEOUS
Qty: 0 | Refills: 0 | DISCHARGE
Start: 2021-07-19 | End: 2021-08-17

## 2021-07-19 RX ORDER — INSULIN GLARGINE 100 [IU]/ML
14 INJECTION, SOLUTION SUBCUTANEOUS
Qty: 0 | Refills: 0 | DISCHARGE
Start: 2021-07-19 | End: 2021-08-17

## 2021-07-19 RX ADMIN — AER TRAVELER 1 APPLICATION(S): 0.5 SOLUTION RECTAL; TOPICAL at 06:00

## 2021-07-19 RX ADMIN — Medication 3 MILLILITER(S): at 17:30

## 2021-07-19 RX ADMIN — NYSTATIN CREAM 1 APPLICATION(S): 100000 CREAM TOPICAL at 18:07

## 2021-07-19 RX ADMIN — Medication 2 SPRAY(S): at 05:58

## 2021-07-19 RX ADMIN — BUDESONIDE AND FORMOTEROL FUMARATE DIHYDRATE 2 PUFF(S): 160; 4.5 AEROSOL RESPIRATORY (INHALATION) at 10:39

## 2021-07-19 RX ADMIN — Medication 2 SPRAY(S): at 05:59

## 2021-07-19 RX ADMIN — Medication 3: at 18:05

## 2021-07-19 RX ADMIN — Medication 3 MILLILITER(S): at 03:19

## 2021-07-19 RX ADMIN — Medication 1: at 12:08

## 2021-07-19 RX ADMIN — Medication 2 SPRAY(S): at 18:04

## 2021-07-19 RX ADMIN — ENOXAPARIN SODIUM 40 MILLIGRAM(S): 100 INJECTION SUBCUTANEOUS at 18:03

## 2021-07-19 RX ADMIN — SODIUM CHLORIDE 3 MILLILITER(S): 9 INJECTION INTRAMUSCULAR; INTRAVENOUS; SUBCUTANEOUS at 03:19

## 2021-07-19 RX ADMIN — PANTOPRAZOLE SODIUM 40 MILLIGRAM(S): 20 TABLET, DELAYED RELEASE ORAL at 06:00

## 2021-07-19 RX ADMIN — Medication 8 UNIT(S): at 08:16

## 2021-07-19 RX ADMIN — Medication 30 MILLIGRAM(S): at 06:00

## 2021-07-19 RX ADMIN — SODIUM CHLORIDE 3 MILLILITER(S): 9 INJECTION INTRAMUSCULAR; INTRAVENOUS; SUBCUTANEOUS at 17:40

## 2021-07-19 RX ADMIN — Medication 500 MILLIGRAM(S): at 12:07

## 2021-07-19 RX ADMIN — NYSTATIN CREAM 1 APPLICATION(S): 100000 CREAM TOPICAL at 05:57

## 2021-07-19 RX ADMIN — Medication 3 MILLILITER(S): at 10:42

## 2021-07-19 RX ADMIN — Medication 8 UNIT(S): at 12:09

## 2021-07-19 RX ADMIN — CHLORHEXIDINE GLUCONATE 1 APPLICATION(S): 213 SOLUTION TOPICAL at 12:08

## 2021-07-19 RX ADMIN — Medication 325 MILLIGRAM(S): at 12:08

## 2021-07-19 RX ADMIN — MONTELUKAST 10 MILLIGRAM(S): 4 TABLET, CHEWABLE ORAL at 12:08

## 2021-07-19 RX ADMIN — AER TRAVELER 1 APPLICATION(S): 0.5 SOLUTION RECTAL; TOPICAL at 18:07

## 2021-07-19 RX ADMIN — Medication 8 UNIT(S): at 18:05

## 2021-07-19 RX ADMIN — SODIUM CHLORIDE 3 MILLILITER(S): 9 INJECTION INTRAMUSCULAR; INTRAVENOUS; SUBCUTANEOUS at 10:49

## 2021-07-19 RX ADMIN — AMLODIPINE BESYLATE 5 MILLIGRAM(S): 2.5 TABLET ORAL at 05:59

## 2021-07-19 NOTE — PROGRESS NOTE ADULT - PROVIDER SPECIALTY LIST ADULT
Hepatology
Pulmonology
Pulmonology
Hepatology
Hepatology
Pulmonology
Infectious Disease
Infectious Disease
Pulmonology
Anesthesia
Pulmonology
Hepatology
Hepatology
Intervent Radiology
Pulmonology

## 2021-07-19 NOTE — PROGRESS NOTE ADULT - NUTRITIONAL ASSESSMENT
This patient has been assessed with a concern for Malnutrition and has been determined to have a diagnosis/diagnoses of Severe protein-calorie malnutrition and Underweight (BMI < 19).    This patient is being managed with:   Diet Consistent Carbohydrate w/Evening Snack-  Soft  Low Sodium  No Pork  Supplement Feeding Modality:  Oral  Glucerna Shake Cans or Servings Per Day:  2       Frequency:  Two Times a day  Entered: Jul 8 2021 12:55PM    

## 2021-07-19 NOTE — PROGRESS NOTE ADULT - ATTENDING SUPERVISION STATEMENT
Fellow
ACP
Fellow
Resident
ACP
Fellow
ACP

## 2021-07-19 NOTE — PROGRESS NOTE ADULT - ASSESSMENT
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 Patient now presented from Pulmonary Office, Dr. Young for worsening SOB, lethargy and cough with thick yellow secretions. Now readmitted for acute on chronic hypercarbic respiratory failure likely in setting of bronchiectasis exacerbation with recurrent PNA.     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   - CT CHEST 6/30 with (+) bronchiectasis   - SCx 6/30 with Pseudomonas Aeurginosa  - Continue on aggressive chest PT with Mucomyst, Duoneb and Chest Vest Q6H   - Continue on Symbicort BID and Singular QD   - s/p Prednisone 40mg QD (6/30 - 7/3)   - s/p Avycaz/ NELLY (6/30 - 7/2) and Cefepime (7/2 - 7/14); Can dc on levaquin  - Hypercarbia improved and now continued on NC QD and AVAPs QHS.   - Has outpatient f/u appt with Dr. Young on 7/15 per Chart Review for possible Lung transplant  - Fever to 101 wbc increased to 18, CX sent , Scx + moderate gm+ cocci in pairs - Blood cx NGTD  - Awaiting insurance approval for NIV at home     # Acute on Chronic Sinusitis   - CT SINUS 6/30 with extensive inflammatory changes throughout the paranasal sinuses and their respective drainage pathways, , improved in the right frontal and sphenoid sinuses since comparison. 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 Cannot rule out CSF leak. Thinning of the left carotid canal wall along the posterior left sphenoid sinus. Cannot rule out dehiscence.  - Continue on Flonase and [place] BID   - MRI Sinuses - No definite evidence of CSF leak.  - ENT following - recs appreciated     # Nodular Liver with possible Cirrhosis ?   - During admission in April patient was found to have nodular contour of liver on US 4/6/2021. Upon further discussion with patient and Hepatology, she was noted to have history of blood transfusion in 1998 with (+) Hep C AB with negative Hep C RNA. She also reports history of CBD stricture in 1998 in [place], s/p PTC/PTBD.   - CLD work up previously noted with HBsAg negative, HBcIgM negative, Hep E IgG negative, Hep A IgM negative, RUPESH, Anti-LKM and AMA negative, Ferritin 169, AFP within normal limits, ASMA 1:40. MR/MRCP without stricturing or hepatic lesions. No ascites on all imaging and no history of HE. No known varices/ no EGD record on record.   - Echo - EF 73%, unremarkable   - Hepatology consulted, recs apreciated  - S/P liver bx 7/7; prelim results with evidence of hepatic fibrosis but no cirrhosis- f/u final results  - Liver bx with mild portal fibrosis   - Follow up in Hepatology Fellow clinic.  - MRCP  done- showing Cirrhotic morphology with no focal hepatic lesion.    # IDDM2, A1C 8.9 (6/2021)   - At home on: Lantus 14U QHS and sliding scale  - In house on: Lantus 15U QHS and Lispro 8U + Moderate ISS in house while on steroids.   - Will trend FS: adjustments will be made after evaluating dinner time FS if warranted     # Ethics/ DISPO/ GOC  - FULL CODE and GOC discussed with SonMichela ([phone]) who expresses wishes of DNI, however will discuss further for DNR. Family wishes for all medical treatment (including invasive and possible lung transplant) to be performed, however if her condition was to worsen they do not wish for intubation. GOC conversation ongoing.   - PT recommends home with home PT   - Awaiting approval for home NIV machine  - c/w AVAP as needed   - Hold off on COVID19 vaccine as patient is on steroids and will reduce antibody response.    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 Patient now presented from Pulmonary Office, Dr. Young for worsening SOB, lethargy and cough with thick yellow secretions. Now readmitted for acute on chronic hypercarbic respiratory failure likely in setting of bronchiectasis exacerbation with recurrent PNA.     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   - CT CHEST 6/30 with (+) bronchiectasis   - SCx 6/30 with Pseudomonas Aeurginosa  - Continue on aggressive chest PT with Mucomyst, Duoneb and Chest Vest Q6H   - Continue on Symbicort BID and Singular QD   - s/p Prednisone 40mg QD (6/30 - 7/3)   - Continue prednisone taper to completion upon discharge  - s/p Avycaz/ NELLY (6/30 - 7/2) and Cefepime (7/2 - 7/14)  - Hypercarbia improved and now continued on NC QD and AVAPs QHS.   - Has outpatient f/u appt with Dr. Young on 7/15 per Chart Review for possible Lung transplant  - Fever to 101 wbc increased to 18, CX sent , Scx + moderate gm+ cocci in pairs - Blood cx NGTD  - Awaiting insurance approval for NIV at home - now approved and to be delivered to home today    # Acute on Chronic Sinusitis   - CT SINUS 6/30 with extensive inflammatory changes throughout the paranasal sinuses and their respective drainage pathways, , improved in the right frontal and sphenoid sinuses since comparison. 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 Cannot rule out CSF leak. Thinning of the left carotid canal wall along the posterior left sphenoid sinus. Cannot rule out dehiscence.  - Continue on Flonase and [place] BID   - MRI Sinuses - No definite evidence of CSF leak.  - ENT following - recs appreciated     # Nodular Liver with possible Cirrhosis ?   - During admission in April patient was found to have nodular contour of liver on US 4/6/2021. Upon further discussion with patient and Hepatology, she was noted to have history of blood transfusion in 1998 with (+) Hep C AB with negative Hep C RNA. She also reports history of CBD stricture in 1998 in [place], s/p PTC/PTBD.   - CLD work up previously noted with HBsAg negative, HBcIgM negative, Hep E IgG negative, Hep A IgM negative, RUPESH, Anti-LKM and AMA negative, Ferritin 169, AFP within normal limits, ASMA 1:40. MR/MRCP without stricturing or hepatic lesions. No ascites on all imaging and no history of HE. No known varices/ no EGD record on record.   - Echo - EF 73%, unremarkable   - Hepatology consulted, recs apreciated  - S/P liver bx 7/7; prelim results with evidence of hepatic fibrosis but no cirrhosis- f/u final results  - Liver bx with mild portal fibrosis   - Follow up in Hepatology Fellow clinic.  - MRCP  done- showing Cirrhotic morphology with no focal hepatic lesion.    # IDDM2, A1C 8.9 (6/2021)   - At home on: Lantus 14U QHS and sliding scale  - In house on: Lantus 15U QHS and Lispro 8U + Moderate ISS in house while on steroids.   - Will trend FS: adjustments will be made after evaluating dinner time FS if warranted     # Ethics/ DISPO/ GOC  - FULL CODE and GOC discussed with Son, Michela ([phone]) who expresses wishes of DNI, however will discuss further for DNR. Family wishes for all medical treatment (including invasive and possible lung transplant) to be performed, however if her condition was to worsen they do not wish for intubation. GOC conversation ongoing.   - PT recommends home with home PT   - Home NIV machine approved  - c/w AVAP as needed   - Hold off on COVID19 vaccine as patient is on steroids and will reduce antibody response.    # DVT PPX -Lovenox sc

## 2021-07-19 NOTE — PROGRESS NOTE ADULT - REASON FOR ADMISSION
SOB

## 2021-07-19 NOTE — PROGRESS NOTE ADULT - NSICDXPILOT_GEN_ALL_CORE
[place]

## 2021-07-19 NOTE — PROGRESS NOTE ADULT - SUBJECTIVE AND OBJECTIVE BOX
CHIEF COMPLAINT: Patient is a 66y old  Female who presents with a chief complaint of SOB (18 Jul 2021 07:28)      INTERVAL EVENTS:     ROS: Seen by bedside during AM rounds     OBJECTIVE:  ICU Vital Signs Last 24 Hrs  T(C): 36.6 (19 Jul 2021 05:56), Max: 36.6 (19 Jul 2021 05:56)  T(F): 97.8 (19 Jul 2021 05:56), Max: 97.8 (19 Jul 2021 05:56)  HR: 73 (19 Jul 2021 06:30) (66 - 90)  BP: 137/67 (19 Jul 2021 05:56) (122/57 - 137/67)  BP(mean): --  ABP: --  ABP(mean): --  RR: 20 (19 Jul 2021 05:56) (16 - 20)  SpO2: 99% (19 Jul 2021 06:30) (96% - 100%)    Mode: [facility]    CAPILLARY BLOOD GLUCOSE      POCT Blood Glucose.: 150 mg/dL (18 Jul 2021 21:02)      PHYSICAL EXAM:  General:   HEENT:   Lymph Nodes:  Neck:   Respiratory:   Cardiovascular:   Abdomen:   Extremities:   Skin:   Neurological:  Psychiatry:    Mode: [facility]      HOSPITAL MEDICATIONS:  MEDICATIONS  (STANDING):  albuterol/ipratropium for Nebulization 3 milliLiter(s) Nebulizer every 6 hours  amLODIPine   Tablet 5 milliGRAM(s) Oral daily  ascorbic acid 500 milliGRAM(s) Oral daily  budesonide 160 MICROgram(s)/formoterol 4.5 MICROgram(s) Inhaler 2 Puff(s) Inhalation two times a day  chlorhexidine 4% Liquid 1 Application(s) Topical daily  dextrose 40% Gel 15 Gram(s) Oral once  dextrose 5%. 1000 milliLiter(s) (50 mL/Hr) IV Continuous <Continuous>  dextrose 5%. 1000 milliLiter(s) (100 mL/Hr) IV Continuous <Continuous>  dextrose 50% Injectable 25 Gram(s) IV Push once  dextrose 50% Injectable 12.5 Gram(s) IV Push once  dextrose 50% Injectable 25 Gram(s) IV Push once  enoxaparin Injectable 40 milliGRAM(s) SubCutaneous daily  famotidine    Tablet 40 milliGRAM(s) Oral at bedtime  ferrous    sulfate 325 milliGRAM(s) Oral daily  fluticasone propionate 50 MICROgram(s)/spray Nasal Spray 2 Spray(s) Both Nostrils two times a day  glucagon  Injectable 1 milliGRAM(s) IntraMuscular once  insulin glargine Injectable (LANTUS) 15 Unit(s) SubCutaneous at bedtime  insulin lispro (ADMELOG) corrective regimen sliding scale   SubCutaneous three times a day before meals  insulin lispro (ADMELOG) corrective regimen sliding scale   SubCutaneous at bedtime  insulin lispro Injectable (ADMELOG) 8 Unit(s) SubCutaneous three times a day before meals  mirtazapine 7.5 milliGRAM(s) Oral at bedtime  montelukast 10 milliGRAM(s) Oral daily  nystatin Cream 1 Application(s) Topical two times a day  pantoprazole    Tablet 40 milliGRAM(s) Oral before breakfast  predniSONE   Tablet 30 milliGRAM(s) Oral daily  senna 2 Tablet(s) Oral at bedtime  sodium chloride 0.65% Nasal 2 Spray(s) Both Nostrils every 12 hours  sodium chloride 3%  Inhalation 3 milliLiter(s) Inhalation every 6 hours  witch hazel Pads 1 Application(s) Topical three times a day    MEDICATIONS  (PRN):  acetaminophen   Tablet .. 650 milliGRAM(s) Oral every 6 hours PRN Temp greater or equal to 38C (100.4F)  acetaminophen   Tablet .. 650 milliGRAM(s) Oral every 12 hours PRN Mild Pain (1 - 3), Moderate Pain (4 - 6)  acetaminophen   Tablet .. 650 milliGRAM(s) Oral every 4 hours PRN Mild Pain (1 - 3)  guaiFENesin Oral Liquid (Sugar-Free) 100 milliGRAM(s) Oral every 6 hours PRN Cough  simethicone 80 milliGRAM(s) Chew three times a day PRN Gas      LABS:                 CHIEF COMPLAINT: Patient is a 66y old  Female who presents with a chief complaint of SOB (18 Jul 2021 07:28)      INTERVAL EVENTS: No acute events. Clinically unchanged.    ROS: See above, remaining ROS negative.     OBJECTIVE:  ICU Vital Signs Last 24 Hrs  T(C): 36.6 (19 Jul 2021 05:56), Max: 36.6 (19 Jul 2021 05:56)  T(F): 97.8 (19 Jul 2021 05:56), Max: 97.8 (19 Jul 2021 05:56)  HR: 73 (19 Jul 2021 06:30) (66 - 90)  BP: 137/67 (19 Jul 2021 05:56) (122/57 - 137/67)  BP(mean): --  ABP: --  ABP(mean): --  RR: 20 (19 Jul 2021 05:56) (16 - 20)  SpO2: 99% (19 Jul 2021 06:30) (96% - 100%)    Mode: standby    CAPILLARY BLOOD GLUCOSE      POCT Blood Glucose.: 150 mg/dL (18 Jul 2021 21:02)      PHYSICAL EXAM:  General:   General: NAD   Cards: S1/S2, no murmurs   Pulm: CTA bilaterally. Mild persistent wheezes noted. Speaking full sentences.   Abdomen: Soft, NTND.   Extremities: No pedal edema. CATRINA of BL upper and lower extremities.  Neurology: AOx3 with no focal neurological deficits     Mode: standby      HOSPITAL MEDICATIONS:  MEDICATIONS  (STANDING):  albuterol/ipratropium for Nebulization 3 milliLiter(s) Nebulizer every 6 hours  amLODIPine   Tablet 5 milliGRAM(s) Oral daily  ascorbic acid 500 milliGRAM(s) Oral daily  budesonide 160 MICROgram(s)/formoterol 4.5 MICROgram(s) Inhaler 2 Puff(s) Inhalation two times a day  chlorhexidine 4% Liquid 1 Application(s) Topical daily  dextrose 40% Gel 15 Gram(s) Oral once  dextrose 5%. 1000 milliLiter(s) (50 mL/Hr) IV Continuous <Continuous>  dextrose 5%. 1000 milliLiter(s) (100 mL/Hr) IV Continuous <Continuous>  dextrose 50% Injectable 25 Gram(s) IV Push once  dextrose 50% Injectable 12.5 Gram(s) IV Push once  dextrose 50% Injectable 25 Gram(s) IV Push once  enoxaparin Injectable 40 milliGRAM(s) SubCutaneous daily  famotidine    Tablet 40 milliGRAM(s) Oral at bedtime  ferrous    sulfate 325 milliGRAM(s) Oral daily  fluticasone propionate 50 MICROgram(s)/spray Nasal Spray 2 Spray(s) Both Nostrils two times a day  glucagon  Injectable 1 milliGRAM(s) IntraMuscular once  insulin glargine Injectable (LANTUS) 15 Unit(s) SubCutaneous at bedtime  insulin lispro (ADMELOG) corrective regimen sliding scale   SubCutaneous three times a day before meals  insulin lispro (ADMELOG) corrective regimen sliding scale   SubCutaneous at bedtime  insulin lispro Injectable (ADMELOG) 8 Unit(s) SubCutaneous three times a day before meals  mirtazapine 7.5 milliGRAM(s) Oral at bedtime  montelukast 10 milliGRAM(s) Oral daily  nystatin Cream 1 Application(s) Topical two times a day  pantoprazole    Tablet 40 milliGRAM(s) Oral before breakfast  predniSONE   Tablet 30 milliGRAM(s) Oral daily  senna 2 Tablet(s) Oral at bedtime  sodium chloride 0.65% Nasal 2 Spray(s) Both Nostrils every 12 hours  sodium chloride 3%  Inhalation 3 milliLiter(s) Inhalation every 6 hours  witch hazel Pads 1 Application(s) Topical three times a day    MEDICATIONS  (PRN):  acetaminophen   Tablet .. 650 milliGRAM(s) Oral every 6 hours PRN Temp greater or equal to 38C (100.4F)  acetaminophen   Tablet .. 650 milliGRAM(s) Oral every 12 hours PRN Mild Pain (1 - 3), Moderate Pain (4 - 6)  acetaminophen   Tablet .. 650 milliGRAM(s) Oral every 4 hours PRN Mild Pain (1 - 3)  guaiFENesin Oral Liquid (Sugar-Free) 100 milliGRAM(s) Oral every 6 hours PRN Cough  simethicone 80 milliGRAM(s) Chew three times a day PRN Gas      LABS:

## 2021-07-19 NOTE — PROGRESS NOTE ADULT - ATTENDING COMMENTS
66F PMH probable primary ciliary dyskinesia with sinusitis, cystic bronchiectasis, and recurrent infections, h/o pseudomonas, Hemophilus influenzae, and Stenotrophomonas colonization, eosinophilic asthma, HTN, and DM2 who presents with acute exacerbation of her bronchiectasis due to Pseudomonas aeruginosa with acute on chronic hypercapnic respiratory failure.    - S/p Avycaz + tobramycin nebs from 6/30-7/2, then s/p cefepime from 7/2-7/13  - Slow prednisone taper  - Airway clearance therapy with albuterol, ipratropium, hypertonic saline, and chest vest/Aerobika  - Continue supplemental nasal cannula @2-4 LPM during the day, goal SpO2>90%  - Nocturnal AVAPS for her chronic hypercapnia  - Continue Symbicort 160-4.5 mcg 2 puffs bid, rinse after use  - Montelukast 10 mg qhs  - Consider long-term azithromycin therapy 250 mg tiw for severe bronchiectasis with pseudomonas colonization  - 2D-echo without evidence of cor pulmonale  - D/c planning when she receives home AVAPS  - Will discuss regarding Pfizer COVID-19 vaccination upon dishcarge (consider delaying until prednisone tapered)

## 2021-07-23 ENCOUNTER — RX RENEWAL (OUTPATIENT)
Age: 66
End: 2021-07-23

## 2021-08-19 ENCOUNTER — APPOINTMENT (OUTPATIENT)
Dept: PULMONOLOGY | Facility: CLINIC | Age: 66
End: 2021-08-19

## 2021-08-30 ENCOUNTER — APPOINTMENT (OUTPATIENT)
Dept: HEPATOLOGY | Facility: CLINIC | Age: 66
End: 2021-08-30
Payer: MEDICAID

## 2021-08-30 ENCOUNTER — LABORATORY RESULT (OUTPATIENT)
Age: 66
End: 2021-08-30

## 2021-08-30 VITALS
BODY MASS INDEX: 16.66 KG/M2 | TEMPERATURE: 97.9 F | WEIGHT: 94 LBS | HEIGHT: 63 IN | DIASTOLIC BLOOD PRESSURE: 72 MMHG | HEART RATE: 97 BPM | RESPIRATION RATE: 16 BRPM | SYSTOLIC BLOOD PRESSURE: 130 MMHG

## 2021-08-30 PROCEDURE — 99072 ADDL SUPL MATRL&STAF TM PHE: CPT

## 2021-08-30 PROCEDURE — 99204 OFFICE O/P NEW MOD 45 MIN: CPT

## 2021-08-31 ENCOUNTER — NON-APPOINTMENT (OUTPATIENT)
Age: 66
End: 2021-08-31

## 2021-08-31 LAB
AFP-TM SERPL-MCNC: 2.2 NG/ML
ALBUMIN SERPL ELPH-MCNC: 3.7 G/DL
ALP BLD-CCNC: 182 U/L
ALT SERPL-CCNC: 35 U/L
ANION GAP SERPL CALC-SCNC: 10 MMOL/L
AST SERPL-CCNC: 39 U/L
BASOPHILS # BLD AUTO: 0.05 K/UL
BASOPHILS NFR BLD AUTO: 0.6 %
BILIRUB SERPL-MCNC: 0.3 MG/DL
BUN SERPL-MCNC: 10 MG/DL
CALCIUM SERPL-MCNC: 9.7 MG/DL
CHLORIDE SERPL-SCNC: 96 MMOL/L
CHOLEST SERPL-MCNC: 147 MG/DL
CO2 SERPL-SCNC: 28 MMOL/L
CREAT SERPL-MCNC: 0.43 MG/DL
EOSINOPHIL # BLD AUTO: 0.16 K/UL
EOSINOPHIL NFR BLD AUTO: 2 %
ESTIMATED AVERAGE GLUCOSE: 203 MG/DL
FERRITIN SERPL-MCNC: 118 NG/ML
GLUCOSE SERPL-MCNC: 305 MG/DL
HAV IGM SER QL: NONREACTIVE
HBA1C MFR BLD HPLC: 8.7 %
HBV CORE IGM SER QL: NONREACTIVE
HBV SURFACE AB SER QL: NONREACTIVE
HCT VFR BLD CALC: 40.2 %
HCV AB SER QL: ABNORMAL
HCV S/CO RATIO: 2.95 S/CO
HDLC SERPL-MCNC: 76 MG/DL
HGB BLD-MCNC: 13 G/DL
IMM GRANULOCYTES NFR BLD AUTO: 0.4 %
INR PPP: 1.05 RATIO
IRON SATN MFR SERPL: 13 %
IRON SERPL-MCNC: 33 UG/DL
LDLC SERPL CALC-MCNC: 59 MG/DL
LYMPHOCYTES # BLD AUTO: 1.63 K/UL
LYMPHOCYTES NFR BLD AUTO: 20.2 %
MAN DIFF?: NORMAL
MCHC RBC-ENTMCNC: 27.4 PG
MCHC RBC-ENTMCNC: 32.3 GM/DL
MCV RBC AUTO: 84.8 FL
MONOCYTES # BLD AUTO: 0.83 K/UL
MONOCYTES NFR BLD AUTO: 10.3 %
NEUTROPHILS # BLD AUTO: 5.35 K/UL
NEUTROPHILS NFR BLD AUTO: 66.5 %
NONHDLC SERPL-MCNC: 71 MG/DL
PLATELET # BLD AUTO: 183 K/UL
POTASSIUM SERPL-SCNC: 4.5 MMOL/L
PROT SERPL-MCNC: 7.1 G/DL
PT BLD: 12.5 SEC
RBC # BLD: 4.74 M/UL
RBC # FLD: 13.8 %
SODIUM SERPL-SCNC: 134 MMOL/L
TIBC SERPL-MCNC: 248 UG/DL
TRIGL SERPL-MCNC: 61 MG/DL
UIBC SERPL-MCNC: 214 UG/DL
WBC # FLD AUTO: 8.05 K/UL

## 2021-08-31 NOTE — ASSESSMENT
[FreeTextEntry1] : Ms. 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 Pt was admitted to the hospital for SOB and resistant pseudomonas PNA where she was found to have cirrhotic morphology on MRI. \par \par -07/15/2021: BW--, AST 37, ALT 44, \par \par 1. Liver Fibrosis\par -biopsy from hospital revealed mild hepatic portal fibrosis, no cirrhosis.  and , +HCV ab with neg HCV PCR (no hx of HCV treatment in past), MRI in hospital showed no portal hypertension.\par -BW ASAP with fibro, no s+s of decompensated liver disease noted on exam \par - I have encouraged a healthy lifestyle including exercising at least 3x times weekly and maintaining a diet low in carbohydrates, fat, sodium (<2gm daily) and cholesterol. Pt admits to a Pescatarian diet, unable to exercise due to pulmonary issues and limited capacity with arthralgias. \par -I have counseled pt on abstaining from alcohol and illicit drug use, avoid herbal and dietary supplements. Encouraged limit use of acetaminophen to <2gm per day and to avoid NSAIDS.    \par \par 2. HM\par -COL: son admits to pt having one recently with outside GI but unclear of when she needs a repeat. Advised to f/u with GI MD\par -HAV/HBV will check status\par \par Plan:\par BW and Fibro \par RTC 1 month with MD CK \par

## 2021-08-31 NOTE — PHYSICAL EXAM
[General Appearance - Alert] : alert [General Appearance - In No Acute Distress] : in no acute distress [General Appearance - Well Nourished] : well nourished [Sclera] : the sclera and conjunctiva were normal [Outer Ear] : the ears and nose were normal in appearance [Neck Appearance] : the appearance of the neck was normal [Jugular Venous Distention Increased] : there was no jugular-venous distention [] : no respiratory distress [Respiration, Rhythm And Depth] : normal respiratory rhythm and effort [Auscultation Breath Sounds / Voice Sounds] : lungs were clear to auscultation bilaterally [Heart Rate And Rhythm] : heart rate was normal and rhythm regular [Heart Sounds] : normal S1 and S2 [Edema] : there was no peripheral edema [Bowel Sounds] : normal bowel sounds [Abdomen Soft] : soft [No CVA Tenderness] : no ~M costovertebral angle tenderness [Nail Clubbing] : no clubbing  or cyanosis of the fingernails [Skin Color & Pigmentation] : normal skin color and pigmentation [Oriented To Time, Place, And Person] : oriented to person, place, and time [Affect] : the affect was normal [Scleral Icterus] : No Scleral Icterus [Abdominal Bruit] : no abdominal bruit [Abdominal  Ascites] : no ascites [Jaundice] : No jaundice [FreeTextEntry1] : wheelchair bound

## 2021-08-31 NOTE — HISTORY OF PRESENT ILLNESS
[Fatigue] : fatigue stable [Malaise] : malaise stable [Fever] : denies fever [Diffuse Joint Pain (Arthralgias)] : arthralgias stable [Muscle Aches, Generalized (Myalgias)] : denies myalgias [Yellow Skin Or Eyes (Jaundice)] : denies jaundice [Abdominal Pain] : denies abdominal pain [Urine Tests Nonspecific Abnormal Findings Biliuria] : denies dark urine [Light Colored Bowel Movement (Acholic Stools)] : denies pale stools [Insomnia] : denies insomnia [Skin: Rash] : denies rash [Itching (Pruritus)] : denies pruritus [Shortness Of Breath] : shortness of breath stable [de-identified] : Ms. 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 Pt was admitted to the hospital for SOB and resistant pseudomonas PNA where she was found to have cirrhotic morphology on MRI. Underwent transjugular liver biopsy on 7/7/2021 that revealed mild portal fibrosis, no cirrhosis. Workup in hospital +ASMA 1:40 and +HCV ab but no viral load (no hx of HCV treatment). Presents today for initial evaluation for liver fibrosis. Denies any alcohol, smoking or illicit drug usage. Denies chest pain/palpitations, SOB, ab pain, n/v, melena/BRBPR, and jaundice. \par \par Social hx:  with 3 adult kids. Homemaker \par OTC/Herbal remedies: none\par \par Workup:\par -07/12/2021: MRI--+Cirrhotic morphology. No steatosis. No focal lesion or ascites.\par BILE DUCTS: Prominence of the central intrahepatic ducts and common bile duct with pneumobilia, probably related to postcholecystectomy state. Celiac axis, SMA and MELISA are patent. Main portal vein, SMV and splenic vein are patent. Hepatic veins are patent.\par -07/15/2021: BW--, AST 37, ALT 44, \par -03/2021: BW--bili 0.2, AST 31, ALT 43, \par \par ROS as below\par  [de-identified] : H/O blood transfusion in 1998

## 2021-08-31 NOTE — REVIEW OF SYSTEMS
[Wheezing] : wheezing [SOB on Exertion] : shortness of breath during exertion [Constipation] : constipation [Arthralgias] : arthralgias [Negative] : Heme/Lymph [Fever] : no fever [Chills] : no chills [Chest Pain] : no chest pain [Palpitations] : no palpitations [Abdominal Pain] : no abdominal pain [Vomiting] : no vomiting [Melena] : no melena [Itching] : no itching [Confused] : no confusion [Dizziness] : no dizziness [Fainting] : no fainting [FreeTextEntry7] : BM every other day with use of colace [FreeTextEntry9] : b/l legs, also noted to have diabetic neuropathy complaints

## 2021-08-31 NOTE — REASON FOR VISIT
[Initial Evaluation] : an initial evaluation [Family Member] : family member [Source: ______] : History obtained from [unfilled] [FreeTextEntry1] : Liver fibrosis

## 2021-09-07 ENCOUNTER — APPOINTMENT (OUTPATIENT)
Dept: PULMONOLOGY | Facility: CLINIC | Age: 66
End: 2021-09-07
Payer: MEDICAID

## 2021-09-07 VITALS
WEIGHT: 93 LBS | SYSTOLIC BLOOD PRESSURE: 131 MMHG | RESPIRATION RATE: 16 BRPM | HEIGHT: 63 IN | TEMPERATURE: 97.6 F | HEART RATE: 89 BPM | BODY MASS INDEX: 16.48 KG/M2 | DIASTOLIC BLOOD PRESSURE: 60 MMHG | OXYGEN SATURATION: 98 %

## 2021-09-07 DIAGNOSIS — R06.02 SHORTNESS OF BREATH: ICD-10-CM

## 2021-09-07 DIAGNOSIS — J18.9 PNEUMONIA, UNSPECIFIED ORGANISM: ICD-10-CM

## 2021-09-07 LAB — PHOSPHATIDYETHANOL (PETH), WHOLE BLOOD: NEGATIVE NG/ML

## 2021-09-07 PROCEDURE — 99214 OFFICE O/P EST MOD 30 MIN: CPT

## 2021-09-07 RX ORDER — FORMOTEROL FUMARATE DIHYDRATE 20 UG/2ML
20 SOLUTION RESPIRATORY (INHALATION)
Qty: 60 | Refills: 5 | Status: DISCONTINUED | COMMUNITY
Start: 2018-12-20 | End: 2021-09-07

## 2021-09-07 RX ORDER — BUDESONIDE 0.5 MG/2ML
0.5 INHALANT ORAL
Qty: 60 | Refills: 0 | Status: DISCONTINUED | COMMUNITY
Start: 2018-12-20 | End: 2021-09-07

## 2021-09-07 RX ORDER — AZITHROMYCIN 500 MG/1
500 TABLET, FILM COATED ORAL
Qty: 5 | Refills: 4 | Status: DISCONTINUED | COMMUNITY
Start: 2020-10-08 | End: 2021-09-07

## 2021-09-07 NOTE — REASON FOR VISIT
[Follow-Up] : a follow-up visit [Abnormal CXR/ Chest CT] : an abnormal CXR/ chest CT [Bronchiectasis] : bronchiectasis [Shortness of Breath] : shortness of breath [ILD] : ILD

## 2021-09-14 NOTE — REVIEW OF SYSTEMS
[Fatigue] : fatigue [Sore Throat] : sore throat [Cough] : cough [Sputum] : sputum [Dyspnea] : dyspnea [SOB on Exertion] : sob on exertion [Fever] : no fever [Chills] : no chills [Hemoptysis] : no hemoptysis [Wheezing] : no wheezing [Edema] : no edema

## 2021-09-14 NOTE — HISTORY OF PRESENT ILLNESS
[TextBox_4] : Mrs. Sky was admitted to [facility] on 06/29/21-07/19/21 for fever, increased cough and sputum production. Chest imaging showed apical ground glass opacity/ presumed pneumonia. She was treated with inhaled Tobramycin and Avycaz for 4 days and then switched to cefepime for 12 days for  pseudomonas. Prior to discharge she was qualified for nocturnal AVAPs. She was discharged on a short prednisone taper. She received the J vaccine day of discharge. \par \par She was last seen in office by Dr Young on 6/29/21 and was referred to the ED directly after the visit for bronchiectasis exacerbation. \par \par \par Today the patient reports that she continues to have severe [place]. She has been adherent to her inhaler regiment and she feels that the nocturnal AVAPs has been helping her. She reports frequent coughing episodes of yellow green sputum. She feels better after coughing up sputum, and reports that the inhaler therapies are helping with this. \par \par Current inhaler regimen on 9/7/21:\par albuterol hypertonic saline, Aerobika TID\par Spiriva daily\par Tobramycin ended 06/2021\par Symbicort twice daily\par She has been off prednisone for several days\par

## 2021-09-14 NOTE — PHYSICAL EXAM
[No Acute Distress] : no acute distress [Normal Appearance] : normal appearance [Normal Rate/Rhythm] : normal rate/rhythm [Normal S1, S2] : normal s1, s2 [No Murmurs] : no murmurs [No Resp Distress] : no resp distress [Benign] : benign [No Edema] : no edema [No Focal Deficits] : no focal deficits [Normal Color/ Pigmentation] : normal color/ pigmentation [Oriented x3] : oriented x3 [Normal Affect] : normal affect [TextBox_2] : severely underweight [TextBox_68] : scattered crackles bilaterally, mild wheezing KELLY pereira. On nasal cannula.

## 2021-09-14 NOTE — DISCUSSION/SUMMARY
[FreeTextEntry1] : Ms. Sky is a 66 year old woman with probable PCD, characterized by sinusitis, bronchiectasis and recurrent infections. Has grown hemophilus influenza in January 2020 which was intermediate to Cipro.Last hospitalized at the end of 7/2021 with acute on chronic hypercapneic respiratory failure and was treated for pseudomonas infection. ABG on 6/29/21 was 7.28/92/109 and after wearing nocturnal ventilation she improved to 7.39/76/53.  She is on a bronchodilator regimen and a clearance regimen. She has not been on inhaled tobramycin since discharge from the hospital.\par She requires nocturnal ventilation for chronic hypercapneic respiratory failure and demonstrated improvement in CO2 levels while wearing it in the hospital.  Continues to wear it at home nocturnally and reports feeling better.  Family reports increased mental alertness and less dyspnea.\par \par Plan: \par Will start prednisone 5mg daily for 3 week course and reassess her in 2 weeks\par Will restart inhaled tobramycin 9/8/2021\par Continue with:\par Symbicort 160 2 puffs twice daily\par Spiriva daily.\par Albuterol via nebulizer, followed by hypersal 3.5% and Aerobika valve three times daily\par \par Continue Nocturnal ventilation- Trilogy ventilator using AVAPS AE to help manage CO2 levels and to prevent hospital readmissions.  Her condition will worsen without the use of Trilogy ventilatory.  \par \par RTC in 2 weeks to assess response to prednisone\par \par \par

## 2021-09-21 ENCOUNTER — APPOINTMENT (OUTPATIENT)
Dept: PULMONOLOGY | Facility: CLINIC | Age: 66
End: 2021-09-21
Payer: MEDICAID

## 2021-09-21 VITALS
WEIGHT: 92 LBS | SYSTOLIC BLOOD PRESSURE: 125 MMHG | HEART RATE: 67 BPM | HEIGHT: 63 IN | BODY MASS INDEX: 16.3 KG/M2 | DIASTOLIC BLOOD PRESSURE: 63 MMHG | OXYGEN SATURATION: 96 % | TEMPERATURE: 97.3 F

## 2021-09-21 PROCEDURE — 99214 OFFICE O/P EST MOD 30 MIN: CPT

## 2021-09-21 RX ORDER — ALBUTEROL SULFATE 90 UG/1
108 (90 BASE) AEROSOL, METERED RESPIRATORY (INHALATION)
Qty: 1 | Refills: 5 | Status: ACTIVE | COMMUNITY
Start: 2018-12-20 | End: 1900-01-01

## 2021-09-21 NOTE — PHYSICAL EXAM
[Normal Oropharynx] : normal oropharynx [Normal Appearance] : normal appearance [No Neck Mass] : no neck mass [Normal Rate/Rhythm] : normal rate/rhythm [Normal S1, S2] : normal s1, s2 [No Murmurs] : no murmurs [No Abnormalities] : no abnormalities [Benign] : benign [No Clubbing] : no clubbing [No Edema] : no edema [Normal Color/ Pigmentation] : normal color/ pigmentation [No Focal Deficits] : no focal deficits [Oriented x3] : oriented x3 [Normal Affect] : normal affect [TextBox_2] : Underweight [TextBox_68] : On nasal cannula, bilateral crackles scattered, wheezing. [TextBox_99] : Using a wheelchair

## 2021-09-21 NOTE — ASSESSMENT
[FreeTextEntry1] : Ms. Sky is a 66 year old woman with probable PCD, characterized by sinusitis, bronchiectasis and recurrent infections. Has grown hemophilus influenza in January 2020 which was intermediate to Cipro. Last hospitalized at the end of 7/2021 with acute on chronic hypercapnic respiratory failure and was treated for pseudomonas infection. ABG on 6/29/21 was 7.28/92/109 and after wearing nocturnal ventilation she improved to 7.39/76/53. She is on a bronchodilator regimen and a clearance regimen. She has stopped taking inhaled tobramycin due to reports of swelling and discomfort.\par She requires nocturnal ventilation for chronic hypercapnic respiratory failure and demonstrated improvement in CO2 levels while wearing it in the hospital. Continues to wear it at home nocturnally and reports feeling better. \par \par Plan: \par Will increase prednisone to 10mg daily for 2 weeks\par Counseled patient on importance of inhaled antibiotics. Will try to obtain inhaled amikacin based on sputum susceptibilities\par Continue with:\par Symbicort 160 2 puffs twice daily\par Spiriva daily.\par Albuterol via nebulizer, followed by hypersal 3.5% and Aerobika valve three times daily\par \par Continue Nocturnal ventilation- Trilogy ventilator using AVAPS AE to help manage CO2 levels and to prevent hospital readmissions. Her condition will worsen without the use of Trilogy ventilatory. \par \par Follow up in 2 months

## 2021-09-21 NOTE — HISTORY OF PRESENT ILLNESS
[TextBox_4] : Ms. Sky is a 66 year-old with PMH bronchiectasis Today the patient reports that she continues to have severe [place]. She has been adherent to her inhaler regimen and she feels that the nocturnal AVAPs has been helping her. Per son, her stomach is bloated in the morning after using it for 5-6 hours. She continues to cough frequently and it is productive of yellow-tanish sputum. She denies fevers/chills. She has lost some weight in the interim. She reports that even with the prednisone 5mg she is still short of breath with exertion and has a cough.\par \par Current inhaler regimen on 9/7/21:\par albuterol hypertonic saline, Aerobika TID\par Spiriva daily\par Symbicort twice daily\par She has previously had a chest vest but did not tolerate it due to pain in the chest. She is currently on prednisone\par \par She reports that she is unable to tolerate inhaled tobramycin, reports that she had shaking and swelling of the face.

## 2021-09-21 NOTE — REVIEW OF SYSTEMS
[Cough] : cough [Sputum] : sputum [Dyspnea] : dyspnea [SOB on Exertion] : sob on exertion [Negative] : Endocrine

## 2021-09-27 ENCOUNTER — APPOINTMENT (OUTPATIENT)
Dept: HEPATOLOGY | Facility: CLINIC | Age: 66
End: 2021-09-27
Payer: MEDICAID

## 2021-09-27 VITALS
DIASTOLIC BLOOD PRESSURE: 73 MMHG | RESPIRATION RATE: 20 BRPM | SYSTOLIC BLOOD PRESSURE: 158 MMHG | OXYGEN SATURATION: 96 % | HEIGHT: 64 IN | BODY MASS INDEX: 15.71 KG/M2 | TEMPERATURE: 97.6 F | HEART RATE: 69 BPM | WEIGHT: 92 LBS

## 2021-09-27 DIAGNOSIS — K74.00 HEPATIC FIBROSIS, UNSPECIFIED: ICD-10-CM

## 2021-09-27 DIAGNOSIS — R68.81 EARLY SATIETY: ICD-10-CM

## 2021-09-27 DIAGNOSIS — K30 FUNCTIONAL DYSPEPSIA: ICD-10-CM

## 2021-09-27 DIAGNOSIS — R76.8 OTHER SPECIFIED ABNORMAL IMMUNOLOGICAL FINDINGS IN SERUM: ICD-10-CM

## 2021-09-27 DIAGNOSIS — R14.0 ABDOMINAL DISTENSION (GASEOUS): ICD-10-CM

## 2021-09-27 PROCEDURE — 99215 OFFICE O/P EST HI 40 MIN: CPT

## 2021-09-27 PROCEDURE — 99072 ADDL SUPL MATRL&STAF TM PHE: CPT

## 2021-09-27 RX ORDER — CIPROFLOXACIN HYDROCHLORIDE 500 MG/1
500 TABLET, FILM COATED ORAL
Qty: 20 | Refills: 0 | Status: DISCONTINUED | COMMUNITY
Start: 2019-07-08 | End: 2021-09-27

## 2021-09-27 RX ORDER — CEFDINIR 300 MG/1
300 CAPSULE ORAL TWICE DAILY
Qty: 14 | Refills: 0 | Status: DISCONTINUED | COMMUNITY
Start: 2021-06-03 | End: 2021-09-27

## 2021-09-27 RX ORDER — PANTOPRAZOLE 40 MG/1
40 TABLET, DELAYED RELEASE ORAL DAILY
Qty: 1 | Refills: 4 | Status: DISCONTINUED | COMMUNITY
Start: 2018-08-13 | End: 2021-09-27

## 2021-09-27 NOTE — HISTORY OF PRESENT ILLNESS
[Fatigue] : fatigue stable [Malaise] : malaise stable [Fever] : denies fever [Diffuse Joint Pain (Arthralgias)] : arthralgias stable [Muscle Aches, Generalized (Myalgias)] : denies myalgias [Yellow Skin Or Eyes (Jaundice)] : denies jaundice [Abdominal Pain] : denies abdominal pain [Urine Tests Nonspecific Abnormal Findings Biliuria] : denies dark urine [Light Colored Bowel Movement (Acholic Stools)] : denies pale stools [Insomnia] : denies insomnia [Itching (Pruritus)] : denies pruritus [Skin: Rash] : denies rash [Shortness Of Breath] : shortness of breath stable [de-identified] : - 9/27/21: returns after bloodwork. HCV RNA not done.\par \par Further history: she is suspected to have primary ciliary dyskinesia (PCD) and was found to have jaundice x 1 month and acute lung disease 20 yrs ago when she lived in [place]; since then progressive lung disease with chronic respiratory failure, on chronic O2 since 2020,  recurrent lung infections including pseudomonas. She is cachectic with BMI 16, 92 lbs, lost 14 lbs since 10/2020 - longstanding thin/cachectic. She been very weak and in a wheelchair for 6 months, since 3/2021. Diabetes was diagnosed ~2005, with difficult-to-control glucose. No h/o obesity.\par Feels chronic bloating and early satiety. Has chronic epigastric pain. Does not eat solid food due to oral dysphagia 2/2 painful denture. \par She was hospitalized with acute respiratory failure at [facility] in July and seen by Dr. Tate. liver\par biopsy which was small in size but did not confirm cirrhosis with only portal\par fibrosis. Review of new MRI shows liver morphology with a notched contour and\par large portal vein but no findings of varices or portal hypertension. findings\par are consistent but not diagnostic of cirrhosis. Wedged hepatic vein pressure\par did not show evidence of portal hypertension. \par Exam: cachectic, in wheelchair, on O2. A&Ox3. Lungs: wheezing and rubs everywhere, coughs frequently. Liver not enlarged. No edema.\par \par - Ms. 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 Pt was admitted to the hospital for SOB and resistant pseudomonas PNA where she was found to have cirrhotic morphology on MRI. Underwent transjugular liver biopsy on 7/7/2021 that revealed mild portal fibrosis, no cirrhosis. Workup in hospital +ASMA 1:40 and +HCV ab but no viral load (no hx of HCV treatment). Presents today for initial evaluation for liver fibrosis. Denies any alcohol, smoking or illicit drug usage. Denies chest pain/palpitations, SOB, ab pain, n/v, melena/BRBPR, and jaundice. \par \par Social hx:  with 3 adult kids. Homemaker \par OTC/Herbal remedies: none\par \par Workup:\par - 7/19/21 Echo: EF 61%, grossly normal, but not enough TR to evaluate TV.\par - 7/15/21 CT angio chest: bronchial dilation and wall thickening, c/w bronchiectasis. Mildly improved LLL peripheral nodular consolidations, adjacent tree-in-bud opacities - likely infectious\par - 7/11/21 liver biopsy, pathology: - Liver with mild portal fibrosis, see comment.\par \par Comment:\par Sections of the liver biopsy show limited, thin, fragmented cores\par of liver.  Only three preston mikael portal tracts are seen and show\par focal, minimal nonspecific inflammation composed predominantly of\par lymphocytes.  Bile ducts are intact and hepatic vasculature is\par unremarkable. No evidence of significant steatosis or cholestasis\par is identified.  Iron stain is negative for iron deposition.\par PAS/D stain is negative for alpha-1-antitrypsin inclusions.\par Reticulin stain shows intact trabecular network without evidence\par of nodular regenerative changes.  Trichrome stain highlights mild\par portal fibrosis without evidence of cirrhosis.  However, there is\par limited tissue and the findings may not be fully representative\par of the remainder of the liver.\par - 7/11/21 Transjugular liver biopsy: Wedged right hepatic venous pressure: 8 mmHg.\par Free right hepatic venous pressure: 6 mmHg. HVPG 2 mmHg. RA pressure not documented.\par -07/12/2021 MRI abdomen: cirrhotic morphology. No steatosis. No focal lesion or ascites.\par BILE DUCTS: Prominence of the central intrahepatic ducts and common bile duct with pneumobilia, probably related to postcholecystectomy state. Celiac axis, SMA and MELISA are patent. Main portal vein, SMV and splenic vein are patent. Hepatic veins are patent.\par -07/15/2021: BW--, AST 37, ALT 44, \par -03/2021: BW--bili 0.2, AST 31, ALT 43, \par \par ROS as below\par  [de-identified] : H/O blood transfusion in 1998

## 2021-09-27 NOTE — ASSESSMENT
[FreeTextEntry1] : Ms. 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 \par \par - cirrhotic appearing liver on imaging including MRI and US in 7/2021\par - liver biopsy 7/11/21: mild fibrosis, small specimen. HVPG only 2 mmHg - normal\par - elevated ALP.  Other LFTs normal.  in 8/2021 - before that, > 200 G/L.\par \par - HCV Ab+, HCV RNA negative 4/2021; A1AT normal in past.\par \par - DM2, poorly controlled. May have had lean MORE, although thin lifelong and recently cachectic  with BMI 16\par \par - early satiety, bloating, chronic epigastric pain despite omeprazole use: DD includes SIBO, peptic ulcer disease, gastritis. She is high risk for complications druing/after EGD due to severe lung disease and cachexia. Is at risk for candida esophagitis and SIBO. Bloating not better in past when on antibiotics. Breath tests for SIBO or H. pylori infection would probably be abnormal due to her severe lung disease and hard to interpret.\par \par - cachexia, weight loss: likely due to her lung disease and recent acute illness/PNA\par \par - colon cancer screening: unclear.\par \par Under consideration for lung transplant. Liver cirrhosis could be a contraindication, if present, or require concomitant liver transplant. Will need further testing to clarify.\par \par Blood tests, radiology reports and images, echocardiogram and recent hospital notes reviewed.\par \par Plan:\par - fibroscan\par - bloodwork as below\par - bloating: H. pylori stool test\par - continue omeprazole for now\par - return in 1 month\par

## 2021-09-28 LAB
AFP-TM SERPL-MCNC: 2.1 NG/ML
ALBUMIN SERPL ELPH-MCNC: 3.9 G/DL
ALP BLD-CCNC: 190 U/L
ALT SERPL-CCNC: 61 U/L
ANION GAP SERPL CALC-SCNC: 12 MMOL/L
AST SERPL-CCNC: 53 U/L
BILIRUB SERPL-MCNC: <0.2 MG/DL
BUN SERPL-MCNC: 11 MG/DL
CALCIUM SERPL-MCNC: 9.8 MG/DL
CERULOPLASMIN SERPL-MCNC: 33 MG/DL
CHLORIDE SERPL-SCNC: 95 MMOL/L
CHOLEST SERPL-MCNC: 167 MG/DL
CO2 SERPL-SCNC: 28 MMOL/L
CREAT SERPL-MCNC: 0.36 MG/DL
DEPRECATED KAPPA LC FREE/LAMBDA SER: 0.92 RATIO
FERRITIN SERPL-MCNC: 86 NG/ML
GGT SERPL-CCNC: 160 U/L
GLUCOSE SERPL-MCNC: 238 MG/DL
HBV CORE IGG+IGM SER QL: NONREACTIVE
HBV SURFACE AB SER QL: NONREACTIVE
HDLC SERPL-MCNC: 83 MG/DL
HEPATITIS A IGG ANTIBODY: REACTIVE
IGA SER QL IEP: 570 MG/DL
IGG SER QL IEP: 1564 MG/DL
IGM SER QL IEP: 112 MG/DL
INR PPP: 0.97 RATIO
IRON SATN MFR SERPL: 9 %
IRON SERPL-MCNC: 24 UG/DL
KAPPA LC CSF-MCNC: 2.95 MG/DL
KAPPA LC SERPL-MCNC: 2.7 MG/DL
LDLC SERPL CALC-MCNC: 75 MG/DL
NONHDLC SERPL-MCNC: 84 MG/DL
POTASSIUM SERPL-SCNC: 4.9 MMOL/L
PROT SERPL-MCNC: 7.4 G/DL
PT BLD: 11.6 SEC
SODIUM SERPL-SCNC: 135 MMOL/L
TIBC SERPL-MCNC: 252 UG/DL
TRIGL SERPL-MCNC: 44 MG/DL
UIBC SERPL-MCNC: 228 UG/DL

## 2021-09-29 ENCOUNTER — APPOINTMENT (OUTPATIENT)
Dept: HEPATOLOGY | Facility: CLINIC | Age: 66
End: 2021-09-29

## 2021-09-29 LAB
ANA PAT FLD IF-IMP: ABNORMAL
ANA SER IF-ACNC: ABNORMAL

## 2021-10-01 ENCOUNTER — APPOINTMENT (OUTPATIENT)
Dept: HEPATOLOGY | Facility: CLINIC | Age: 66
End: 2021-10-01

## 2021-10-01 LAB
HCV RNA SERPL NAA DL=5-ACNC: NOT DETECTED IU/ML
HCV RNA SERPL NAA+PROBE-LOG IU: NOT DETECTED LOG10IU/ML
MITOCHONDRIA AB SER IF-ACNC: NORMAL
SMOOTH MUSCLE AB SER QL IF: ABNORMAL

## 2021-10-04 ENCOUNTER — APPOINTMENT (OUTPATIENT)
Dept: HEPATOLOGY | Facility: CLINIC | Age: 66
End: 2021-10-04

## 2021-10-04 NOTE — PROVIDER CONTACT NOTE (OTHER) - NAME OF MD/NP/PA/DO NOTIFIED:
LUZ Merino albuterol 2.5 mg/3 mL (0.083%) inhalation solution: 2 puff(s) inhaled every 4 hours, As Needed   amoxicillin-clavulanate 875 mg-125 mg oral tablet: 1 tab(s) orally 2 times a day  Breo Ellipta:   gabapentin 300 mg oral capsule: 1 cap(s) orally 3 times a day  lamoTRIgine 150 mg oral tablet: 1  orally once a day  Melatonin: 20 milligram(s) orally once a day (at bedtime)  metaxalone 800 mg oral tablet: orally 2 times a day  MiraLax oral powder for reconstitution: 17 gram(s) orally once a day  as needed for constipation  pantoprazole 40 mg oral delayed release tablet: 1 tab(s) orally once a day   PARoxetine mesylate: 60 milligram(s) orally once a day  senna oral tablet: 2 tab(s) orally once a day (at bedtime)   traZODone 150 mg oral tablet: 1 tab(s) orally once a day (at bedtime)   amoxicillin-clavulanate 875 mg-125 mg oral tablet: 1 tab(s) orally 2 times a day  Breo Ellipta:   gabapentin 300 mg oral capsule: 1 cap(s) orally 3 times a day  lamoTRIgine 150 mg oral tablet: 1  orally once a day  Melatonin: 20 milligram(s) orally once a day (at bedtime)  metaxalone 800 mg oral tablet: orally 2 times a day  MiraLax oral powder for reconstitution: 17 gram(s) orally once a day  as needed for constipation  pantoprazole 40 mg oral delayed release tablet: 1 tab(s) orally once a day   PARoxetine mesylate: 60 milligram(s) orally once a day  ProAir HFA 90 mcg/inh inhalation aerosol: 2 puff(s) inhaled every 6 hours, As Needed   senna oral tablet: 2 tab(s) orally once a day (at bedtime)   traZODone 150 mg oral tablet: 1 tab(s) orally once a day (at bedtime)

## 2021-10-06 LAB
A1AT PHENOTYP SERPL-IMP: NORMAL
A1AT SERPL-MCNC: 157 MG/DL

## 2021-10-29 ENCOUNTER — APPOINTMENT (OUTPATIENT)
Dept: HEPATOLOGY | Facility: CLINIC | Age: 66
End: 2021-10-29

## 2021-11-23 ENCOUNTER — APPOINTMENT (OUTPATIENT)
Dept: PULMONOLOGY | Facility: CLINIC | Age: 66
End: 2021-11-23

## 2021-12-01 ENCOUNTER — APPOINTMENT (OUTPATIENT)
Dept: PULMONOLOGY | Facility: CLINIC | Age: 66
End: 2021-12-01

## 2021-12-01 ENCOUNTER — NON-APPOINTMENT (OUTPATIENT)
Age: 66
End: 2021-12-01

## 2021-12-01 ENCOUNTER — APPOINTMENT (OUTPATIENT)
Dept: PULMONOLOGY | Facility: CLINIC | Age: 66
End: 2021-12-01
Payer: MEDICAID

## 2021-12-01 PROCEDURE — 99443: CPT

## 2021-12-01 NOTE — HISTORY OF PRESENT ILLNESS
[Home] : at home, [unfilled] , at the time of the visit. [Medical Office: ([facility])___] : at the medical office located in  [Family Member] : family member [TextBox_4] : Ms. Sky is a 66 year-old with PMH bronchiectasis maintained on an airway regimen that includes:\par Current inhaler regimen on 9/7/21:\par albuterol hypertonic saline, Aerobika TID\par Spiriva daily\par Symbicort twice daily\par Just completed a course of inhaled amikacin twice daily for 28 days.  Also recently completed a prednisone taper.  \par She has previously had a chest vest but did not tolerate it due to pain in the chest.\par \par She reports that she is unable to tolerate inhaled amikacin, similar to the tobramycin- she experienced shaking in her body and soreness of her mouth.  \par \par she is participating in this visit today with her son yung, who always accompanies her to the office.  He states that her breathing is stable.  Major issue is her weight.  She has lost an additional 5 lbs.  Taking one glucerna each day, trying to eat avocados, nuts, calorie dense foods, monitoring her glucose. \par Recently diagnosed with cirrhosis, seeing hepatology.  History of tevin with CBD stricture, which was dilated in [place] in 1998.\par Workup in progress. \par Using nocturnal ventilation every night.  States that it helps her.  \par She is using Astral 150

## 2021-12-01 NOTE — ASSESSMENT
[FreeTextEntry1] : Ms. Sky is a 66 year old woman with probable PCD, characterized by sinusitis, bronchiectasis and recurrent infections. Has grown hemophilus influenza in January 2020 which was intermediate to Cipro. Last hospitalized at the end of 7/2021 with acute on chronic hypercapnic respiratory failure and was treated for pseudomonas infection. ABG on 6/29/21 was 7.28/92/109 and after wearing nocturnal ventilation she improved to 7.39/76/53. She is on a bronchodilator regimen and a clearance regimen. She has stopped taking inhaled tobramycin due to reports of swelling and discomfort but  used amikacin this month.  She reports a tremulous sensation and soreness in her mouth.  She requires nocturnal ventilation for chronic hypercapnic respiratory failure and demonstrated improvement in CO2 levels while wearing it in the hospital. Continues to wear it at home nocturnally and reports feeling better. \par she is stable from the pulmonary standpoint and adherent to her regimen according to her son.  \par Main issue is her progressive weight loss which is concerning.  She has early satiety.  Not sure if this is from the work of breathing, vs cirrhosis.  Has a history of biliary disease, with CBD stricutre, status post dilation in [place]- not sure if this is playing a role. \par Plan: \par \par Continue with:\par Symbicort 160 2 puffs twice daily\par Spiriva daily.\par Albuterol via nebulizer, followed by hypersal 3.5% and Aerobika valve three times daily\par \par Continue Nocturnal ventilation- Trilogy ventilator using AVAPS AE to help manage CO2 levels and to prevent hospital readmissions. Her condition will worsen without the use of Trilogy ventilatory. I will obtain usage report from [facility]. Will request transcutaneous CO2 monitoring.\par \par Will speak with GI/hepatology regarding weight loss and need for further workup.\par Will refer to our CF nutritionist and consider appetite stimulant.\par F/U in one month in office.  \par Renewed albuterol and hypersal.  \par \par

## 2021-12-21 ENCOUNTER — NON-APPOINTMENT (OUTPATIENT)
Age: 66
End: 2021-12-21

## 2021-12-27 NOTE — PATIENT PROFILE ADULT. - PROVIDER NOTIFICATION
Myriam Norris was seen and treated in our emergency department on 12/27/2021. She may return to work on 12/29/2021. If you have any questions or concerns, please don't hesitate to call.       Erin Sosa, NIKI - CNP
Information could not be obtained

## 2022-01-21 ENCOUNTER — INPATIENT (INPATIENT)
Facility: HOSPITAL | Age: 67
LOS: 9 days | Discharge: HOME | End: 2022-01-31
Attending: HOSPITALIST | Admitting: HOSPITALIST
Payer: MEDICAID

## 2022-01-21 ENCOUNTER — APPOINTMENT (OUTPATIENT)
Dept: PULMONOLOGY | Facility: CLINIC | Age: 67
End: 2022-01-21
Payer: MEDICAID

## 2022-01-21 VITALS
DIASTOLIC BLOOD PRESSURE: 74 MMHG | OXYGEN SATURATION: 97 % | SYSTOLIC BLOOD PRESSURE: 144 MMHG | HEIGHT: 65.5 IN | HEART RATE: 115 BPM | RESPIRATION RATE: 25 BRPM | TEMPERATURE: 100 F

## 2022-01-21 DIAGNOSIS — F32.89 OTHER SPECIFIED DEPRESSIVE EPISODES: ICD-10-CM

## 2022-01-21 DIAGNOSIS — J18.9 PNEUMONIA, UNSPECIFIED ORGANISM: ICD-10-CM

## 2022-01-21 DIAGNOSIS — K21.9 GASTRO-ESOPHAGEAL REFLUX DISEASE WITHOUT ESOPHAGITIS: ICD-10-CM

## 2022-01-21 DIAGNOSIS — E11.9 TYPE 2 DIABETES MELLITUS WITHOUT COMPLICATIONS: ICD-10-CM

## 2022-01-21 DIAGNOSIS — Z29.9 ENCOUNTER FOR PROPHYLACTIC MEASURES, UNSPECIFIED: ICD-10-CM

## 2022-01-21 DIAGNOSIS — U07.1 COVID-19: ICD-10-CM

## 2022-01-21 DIAGNOSIS — Z90.49 ACQUIRED ABSENCE OF OTHER SPECIFIED PARTS OF DIGESTIVE TRACT: Chronic | ICD-10-CM

## 2022-01-21 DIAGNOSIS — J47.9 BRONCHIECTASIS, UNCOMPLICATED: ICD-10-CM

## 2022-01-21 DIAGNOSIS — J15.9 UNSPECIFIED BACTERIAL PNEUMONIA: ICD-10-CM

## 2022-01-21 DIAGNOSIS — I10 ESSENTIAL (PRIMARY) HYPERTENSION: ICD-10-CM

## 2022-01-21 DIAGNOSIS — Z98.890 OTHER SPECIFIED POSTPROCEDURAL STATES: Chronic | ICD-10-CM

## 2022-01-21 LAB
A1C WITH ESTIMATED AVERAGE GLUCOSE RESULT: 9.4 % — HIGH (ref 4–5.6)
ALBUMIN SERPL ELPH-MCNC: 4 G/DL — SIGNIFICANT CHANGE UP (ref 3.3–5)
ALP SERPL-CCNC: 233 U/L — HIGH (ref 40–120)
ALT FLD-CCNC: 63 U/L — HIGH (ref 4–33)
ANION GAP SERPL CALC-SCNC: 8 MMOL/L — SIGNIFICANT CHANGE UP (ref 7–14)
APPEARANCE UR: CLEAR — SIGNIFICANT CHANGE UP
APTT BLD: 26.7 SEC — LOW (ref 27–36.3)
AST SERPL-CCNC: 59 U/L — HIGH (ref 4–32)
B PERT DNA SPEC QL NAA+PROBE: SIGNIFICANT CHANGE UP
B PERT+PARAPERT DNA PNL SPEC NAA+PROBE: SIGNIFICANT CHANGE UP
BACTERIA # UR AUTO: NEGATIVE — SIGNIFICANT CHANGE UP
BASE EXCESS BLDV CALC-SCNC: 7.7 MMOL/L — HIGH (ref -2–3)
BASOPHILS # BLD AUTO: 0.03 K/UL — SIGNIFICANT CHANGE UP (ref 0–0.2)
BASOPHILS NFR BLD AUTO: 0.2 % — SIGNIFICANT CHANGE UP (ref 0–2)
BILIRUB SERPL-MCNC: <0.2 MG/DL — SIGNIFICANT CHANGE UP (ref 0.2–1.2)
BILIRUB UR-MCNC: NEGATIVE — SIGNIFICANT CHANGE UP
BLOOD GAS VENOUS COMPREHENSIVE RESULT: SIGNIFICANT CHANGE UP
BORDETELLA PARAPERTUSSIS (RAPRVP): SIGNIFICANT CHANGE UP
BUN SERPL-MCNC: 10 MG/DL — SIGNIFICANT CHANGE UP (ref 7–23)
C PNEUM DNA SPEC QL NAA+PROBE: SIGNIFICANT CHANGE UP
CALCIUM SERPL-MCNC: 9.6 MG/DL — SIGNIFICANT CHANGE UP (ref 8.4–10.5)
CHLORIDE BLDV-SCNC: 98 MMOL/L — SIGNIFICANT CHANGE UP (ref 96–108)
CHLORIDE SERPL-SCNC: 95 MMOL/L — LOW (ref 98–107)
CO2 BLDV-SCNC: 39.6 MMOL/L — HIGH (ref 22–26)
CO2 SERPL-SCNC: 32 MMOL/L — HIGH (ref 22–31)
COLOR SPEC: SIGNIFICANT CHANGE UP
CREAT SERPL-MCNC: 0.37 MG/DL — LOW (ref 0.5–1.3)
CRP SERPL-MCNC: 6.9 MG/L — HIGH
DIFF PNL FLD: NEGATIVE — SIGNIFICANT CHANGE UP
EOSINOPHIL # BLD AUTO: 0.02 K/UL — SIGNIFICANT CHANGE UP (ref 0–0.5)
EOSINOPHIL NFR BLD AUTO: 0.2 % — SIGNIFICANT CHANGE UP (ref 0–6)
EPI CELLS # UR: 0 /HPF — SIGNIFICANT CHANGE UP (ref 0–5)
ESTIMATED AVERAGE GLUCOSE: 223 — SIGNIFICANT CHANGE UP
FERRITIN SERPL-MCNC: 253 NG/ML — HIGH (ref 15–150)
FLUAV SUBTYP SPEC NAA+PROBE: SIGNIFICANT CHANGE UP
FLUBV RNA SPEC QL NAA+PROBE: SIGNIFICANT CHANGE UP
GAS PNL BLDV: 132 MMOL/L — LOW (ref 136–145)
GLUCOSE BLDC GLUCOMTR-MCNC: 214 MG/DL — HIGH (ref 70–99)
GLUCOSE BLDC GLUCOMTR-MCNC: 345 MG/DL — HIGH (ref 70–99)
GLUCOSE BLDV-MCNC: 119 MG/DL — HIGH (ref 70–99)
GLUCOSE SERPL-MCNC: 121 MG/DL — HIGH (ref 70–99)
GLUCOSE UR QL: ABNORMAL
GRAM STN FLD: SIGNIFICANT CHANGE UP
HADV DNA SPEC QL NAA+PROBE: SIGNIFICANT CHANGE UP
HCO3 BLDV-SCNC: 37 MMOL/L — HIGH (ref 22–29)
HCOV 229E RNA SPEC QL NAA+PROBE: SIGNIFICANT CHANGE UP
HCOV HKU1 RNA SPEC QL NAA+PROBE: SIGNIFICANT CHANGE UP
HCOV NL63 RNA SPEC QL NAA+PROBE: SIGNIFICANT CHANGE UP
HCOV OC43 RNA SPEC QL NAA+PROBE: SIGNIFICANT CHANGE UP
HCT VFR BLD CALC: 48.6 % — HIGH (ref 34.5–45)
HCT VFR BLDA CALC: 46 % — SIGNIFICANT CHANGE UP (ref 34.5–46.5)
HGB BLD CALC-MCNC: 15.3 G/DL — SIGNIFICANT CHANGE UP (ref 11.5–15.5)
HGB BLD-MCNC: 15.4 G/DL — SIGNIFICANT CHANGE UP (ref 11.5–15.5)
HMPV RNA SPEC QL NAA+PROBE: SIGNIFICANT CHANGE UP
HPIV1 RNA SPEC QL NAA+PROBE: SIGNIFICANT CHANGE UP
HPIV2 RNA SPEC QL NAA+PROBE: SIGNIFICANT CHANGE UP
HPIV3 RNA SPEC QL NAA+PROBE: SIGNIFICANT CHANGE UP
HPIV4 RNA SPEC QL NAA+PROBE: SIGNIFICANT CHANGE UP
IANC: 10.55 K/UL — HIGH (ref 1.5–8.5)
IMM GRANULOCYTES NFR BLD AUTO: 0.3 % — SIGNIFICANT CHANGE UP (ref 0–1.5)
INR BLD: 0.94 RATIO — SIGNIFICANT CHANGE UP (ref 0.88–1.16)
KETONES UR-MCNC: NEGATIVE — SIGNIFICANT CHANGE UP
LACTATE BLDV-MCNC: 1.9 MMOL/L — SIGNIFICANT CHANGE UP (ref 0.5–2)
LDH SERPL L TO P-CCNC: 591 U/L — HIGH (ref 135–225)
LEUKOCYTE ESTERASE UR-ACNC: NEGATIVE — SIGNIFICANT CHANGE UP
LYMPHOCYTES # BLD AUTO: 1.12 K/UL — SIGNIFICANT CHANGE UP (ref 1–3.3)
LYMPHOCYTES # BLD AUTO: 8.9 % — LOW (ref 13–44)
M PNEUMO DNA SPEC QL NAA+PROBE: SIGNIFICANT CHANGE UP
MAGNESIUM SERPL-MCNC: 1.9 MG/DL — SIGNIFICANT CHANGE UP (ref 1.6–2.6)
MCHC RBC-ENTMCNC: 27.8 PG — SIGNIFICANT CHANGE UP (ref 27–34)
MCHC RBC-ENTMCNC: 31.7 GM/DL — LOW (ref 32–36)
MCV RBC AUTO: 87.9 FL — SIGNIFICANT CHANGE UP (ref 80–100)
MONOCYTES # BLD AUTO: 0.81 K/UL — SIGNIFICANT CHANGE UP (ref 0–0.9)
MONOCYTES NFR BLD AUTO: 6.4 % — SIGNIFICANT CHANGE UP (ref 2–14)
NEUTROPHILS # BLD AUTO: 10.55 K/UL — HIGH (ref 1.8–7.4)
NEUTROPHILS NFR BLD AUTO: 84 % — HIGH (ref 43–77)
NITRITE UR-MCNC: NEGATIVE — SIGNIFICANT CHANGE UP
NRBC # BLD: 0 /100 WBCS — SIGNIFICANT CHANGE UP
NRBC # FLD: 0 K/UL — SIGNIFICANT CHANGE UP
PCO2 BLDV: 74 MMHG — HIGH (ref 39–42)
PH BLDV: 7.31 — LOW (ref 7.32–7.43)
PH UR: 7 — SIGNIFICANT CHANGE UP (ref 5–8)
PLATELET # BLD AUTO: 182 K/UL — SIGNIFICANT CHANGE UP (ref 150–400)
PO2 BLDV: 21 MMHG — SIGNIFICANT CHANGE UP
POTASSIUM BLDV-SCNC: 5.6 MMOL/L — HIGH (ref 3.5–5.1)
POTASSIUM SERPL-MCNC: 5 MMOL/L — SIGNIFICANT CHANGE UP (ref 3.5–5.3)
POTASSIUM SERPL-SCNC: 5 MMOL/L — SIGNIFICANT CHANGE UP (ref 3.5–5.3)
PROCALCITONIN SERPL-MCNC: 0.06 NG/ML — SIGNIFICANT CHANGE UP (ref 0.02–0.1)
PROT SERPL-MCNC: 8.6 G/DL — HIGH (ref 6–8.3)
PROT UR-MCNC: ABNORMAL
PROTHROM AB SERPL-ACNC: 10.8 SEC — SIGNIFICANT CHANGE UP (ref 10.6–13.6)
RAPID RVP RESULT: DETECTED
RBC # BLD: 5.53 M/UL — HIGH (ref 3.8–5.2)
RBC # FLD: 13.1 % — SIGNIFICANT CHANGE UP (ref 10.3–14.5)
RBC CASTS # UR COMP ASSIST: 1 /HPF — SIGNIFICANT CHANGE UP (ref 0–4)
RSV RNA SPEC QL NAA+PROBE: SIGNIFICANT CHANGE UP
RV+EV RNA SPEC QL NAA+PROBE: SIGNIFICANT CHANGE UP
SAO2 % BLDV: 27.9 % — SIGNIFICANT CHANGE UP
SARS-COV-2 RNA SPEC QL NAA+PROBE: DETECTED
SODIUM SERPL-SCNC: 135 MMOL/L — SIGNIFICANT CHANGE UP (ref 135–145)
SP GR SPEC: 1.01 — SIGNIFICANT CHANGE UP (ref 1–1.05)
SPECIMEN SOURCE: SIGNIFICANT CHANGE UP
TROPONIN T, HIGH SENSITIVITY RESULT: 17 NG/L — SIGNIFICANT CHANGE UP
TSH SERPL-MCNC: 4.32 UIU/ML — HIGH (ref 0.27–4.2)
UROBILINOGEN FLD QL: SIGNIFICANT CHANGE UP
WBC # BLD: 12.57 K/UL — HIGH (ref 3.8–10.5)
WBC # FLD AUTO: 12.57 K/UL — HIGH (ref 3.8–10.5)
WBC UR QL: 1 /HPF — SIGNIFICANT CHANGE UP (ref 0–5)

## 2022-01-21 PROCEDURE — 99215 OFFICE O/P EST HI 40 MIN: CPT | Mod: 95

## 2022-01-21 PROCEDURE — 71045 X-RAY EXAM CHEST 1 VIEW: CPT | Mod: 26

## 2022-01-21 PROCEDURE — 99223 1ST HOSP IP/OBS HIGH 75: CPT | Mod: GC

## 2022-01-21 PROCEDURE — 93010 ELECTROCARDIOGRAM REPORT: CPT

## 2022-01-21 PROCEDURE — 99291 CRITICAL CARE FIRST HOUR: CPT | Mod: 25

## 2022-01-21 PROCEDURE — 99223 1ST HOSP IP/OBS HIGH 75: CPT

## 2022-01-21 RX ORDER — MEROPENEM 1 G/30ML
1000 INJECTION INTRAVENOUS ONCE
Refills: 0 | Status: COMPLETED | OUTPATIENT
Start: 2022-01-21 | End: 2022-01-21

## 2022-01-21 RX ORDER — ASCORBIC ACID 60 MG
1 TABLET,CHEWABLE ORAL
Qty: 0 | Refills: 0 | DISCHARGE

## 2022-01-21 RX ORDER — INSULIN LISPRO 100/ML
VIAL (ML) SUBCUTANEOUS
Refills: 0 | Status: DISCONTINUED | OUTPATIENT
Start: 2022-01-21 | End: 2022-01-24

## 2022-01-21 RX ORDER — TIOTROPIUM BROMIDE 18 UG/1
1 CAPSULE ORAL; RESPIRATORY (INHALATION) DAILY
Refills: 0 | Status: DISCONTINUED | OUTPATIENT
Start: 2022-01-21 | End: 2022-01-31

## 2022-01-21 RX ORDER — FLUTICASONE PROPIONATE 50 MCG
1 SPRAY, SUSPENSION NASAL
Refills: 0 | Status: DISCONTINUED | OUTPATIENT
Start: 2022-01-21 | End: 2022-01-31

## 2022-01-21 RX ORDER — SENNA PLUS 8.6 MG/1
2 TABLET ORAL AT BEDTIME
Refills: 0 | Status: DISCONTINUED | OUTPATIENT
Start: 2022-01-21 | End: 2022-01-24

## 2022-01-21 RX ORDER — MIRTAZAPINE 45 MG/1
7.5 TABLET, ORALLY DISINTEGRATING ORAL AT BEDTIME
Refills: 0 | Status: DISCONTINUED | OUTPATIENT
Start: 2022-01-21 | End: 2022-01-31

## 2022-01-21 RX ORDER — FERROUS SULFATE 325(65) MG
325 TABLET ORAL DAILY
Refills: 0 | Status: DISCONTINUED | OUTPATIENT
Start: 2022-01-21 | End: 2022-01-31

## 2022-01-21 RX ORDER — SODIUM CHLORIDE 9 MG/ML
1000 INJECTION, SOLUTION INTRAVENOUS
Refills: 0 | Status: DISCONTINUED | OUTPATIENT
Start: 2022-01-21 | End: 2022-01-31

## 2022-01-21 RX ORDER — METOPROLOL TARTRATE 50 MG
50 TABLET ORAL DAILY
Refills: 0 | Status: DISCONTINUED | OUTPATIENT
Start: 2022-01-21 | End: 2022-01-31

## 2022-01-21 RX ORDER — AZITHROMYCIN 500 MG/1
500 TABLET, FILM COATED ORAL ONCE
Refills: 0 | Status: COMPLETED | OUTPATIENT
Start: 2022-01-21 | End: 2022-01-21

## 2022-01-21 RX ORDER — TOBRAMYCIN SULFATE 40 MG/ML
4 VIAL (ML) INJECTION
Qty: 0 | Refills: 0 | DISCHARGE

## 2022-01-21 RX ORDER — INSULIN LISPRO 100/ML
VIAL (ML) SUBCUTANEOUS AT BEDTIME
Refills: 0 | Status: DISCONTINUED | OUTPATIENT
Start: 2022-01-21 | End: 2022-01-25

## 2022-01-21 RX ORDER — DEXTROSE 50 % IN WATER 50 %
12.5 SYRINGE (ML) INTRAVENOUS ONCE
Refills: 0 | Status: DISCONTINUED | OUTPATIENT
Start: 2022-01-21 | End: 2022-01-21

## 2022-01-21 RX ORDER — AZITHROMYCIN 500 MG/1
500 TABLET, FILM COATED ORAL EVERY 24 HOURS
Refills: 0 | Status: DISCONTINUED | OUTPATIENT
Start: 2022-01-22 | End: 2022-01-23

## 2022-01-21 RX ORDER — ACETAMINOPHEN 500 MG
650 TABLET ORAL EVERY 6 HOURS
Refills: 0 | Status: DISCONTINUED | OUTPATIENT
Start: 2022-01-21 | End: 2022-01-31

## 2022-01-21 RX ORDER — DEXTROSE 50 % IN WATER 50 %
25 SYRINGE (ML) INTRAVENOUS ONCE
Refills: 0 | Status: DISCONTINUED | OUTPATIENT
Start: 2022-01-21 | End: 2022-01-21

## 2022-01-21 RX ORDER — SODIUM CHLORIDE 9 MG/ML
1000 INJECTION, SOLUTION INTRAVENOUS
Refills: 0 | Status: DISCONTINUED | OUTPATIENT
Start: 2022-01-21 | End: 2022-01-21

## 2022-01-21 RX ORDER — DEXTROSE 50 % IN WATER 50 %
15 SYRINGE (ML) INTRAVENOUS ONCE
Refills: 0 | Status: DISCONTINUED | OUTPATIENT
Start: 2022-01-21 | End: 2022-01-21

## 2022-01-21 RX ORDER — REMDESIVIR 5 MG/ML
200 INJECTION INTRAVENOUS EVERY 24 HOURS
Refills: 0 | Status: COMPLETED | OUTPATIENT
Start: 2022-01-21 | End: 2022-01-21

## 2022-01-21 RX ORDER — INSULIN LISPRO 100/ML
6 VIAL (ML) SUBCUTANEOUS
Refills: 0 | Status: DISCONTINUED | OUTPATIENT
Start: 2022-01-21 | End: 2022-01-23

## 2022-01-21 RX ORDER — AMLODIPINE BESYLATE 2.5 MG/1
1 TABLET ORAL
Qty: 0 | Refills: 0 | DISCHARGE

## 2022-01-21 RX ORDER — DEXTROSE 50 % IN WATER 50 %
25 SYRINGE (ML) INTRAVENOUS ONCE
Refills: 0 | Status: DISCONTINUED | OUTPATIENT
Start: 2022-01-21 | End: 2022-01-31

## 2022-01-21 RX ORDER — ENOXAPARIN SODIUM 100 MG/ML
30 INJECTION SUBCUTANEOUS DAILY
Refills: 0 | Status: DISCONTINUED | OUTPATIENT
Start: 2022-01-21 | End: 2022-01-31

## 2022-01-21 RX ORDER — ACETAMINOPHEN 500 MG
650 TABLET ORAL ONCE
Refills: 0 | Status: DISCONTINUED | OUTPATIENT
Start: 2022-01-21 | End: 2022-01-21

## 2022-01-21 RX ORDER — INSULIN GLARGINE 100 [IU]/ML
14 INJECTION, SOLUTION SUBCUTANEOUS AT BEDTIME
Refills: 0 | Status: DISCONTINUED | OUTPATIENT
Start: 2022-01-21 | End: 2022-01-23

## 2022-01-21 RX ORDER — LOSARTAN POTASSIUM 100 MG/1
50 TABLET, FILM COATED ORAL DAILY
Refills: 0 | Status: DISCONTINUED | OUTPATIENT
Start: 2022-01-21 | End: 2022-01-31

## 2022-01-21 RX ORDER — PREGABALIN 225 MG/1
1000 CAPSULE ORAL
Qty: 0 | Refills: 0 | DISCHARGE

## 2022-01-21 RX ORDER — SODIUM CHLORIDE 9 MG/ML
4 INJECTION INTRAMUSCULAR; INTRAVENOUS; SUBCUTANEOUS
Qty: 0 | Refills: 0 | DISCHARGE

## 2022-01-21 RX ORDER — PANTOPRAZOLE SODIUM 20 MG/1
40 TABLET, DELAYED RELEASE ORAL
Refills: 0 | Status: DISCONTINUED | OUTPATIENT
Start: 2022-01-21 | End: 2022-01-31

## 2022-01-21 RX ORDER — VANCOMYCIN HCL 1 G
1000 VIAL (EA) INTRAVENOUS ONCE
Refills: 0 | Status: COMPLETED | OUTPATIENT
Start: 2022-01-21 | End: 2022-01-21

## 2022-01-21 RX ORDER — KETOTIFEN FUMARATE 0.34 MG/ML
1 SOLUTION OPHTHALMIC
Refills: 0 | Status: DISCONTINUED | OUTPATIENT
Start: 2022-01-21 | End: 2022-01-31

## 2022-01-21 RX ORDER — DEXAMETHASONE 0.5 MG/5ML
6 ELIXIR ORAL DAILY
Refills: 0 | Status: DISCONTINUED | OUTPATIENT
Start: 2022-01-21 | End: 2022-01-21

## 2022-01-21 RX ORDER — SODIUM CHLORIDE 9 MG/ML
800 INJECTION, SOLUTION INTRAVENOUS ONCE
Refills: 0 | Status: COMPLETED | OUTPATIENT
Start: 2022-01-21 | End: 2022-01-21

## 2022-01-21 RX ORDER — INSULIN LISPRO 100/ML
VIAL (ML) SUBCUTANEOUS AT BEDTIME
Refills: 0 | Status: DISCONTINUED | OUTPATIENT
Start: 2022-01-21 | End: 2022-01-21

## 2022-01-21 RX ORDER — DEXTROSE 50 % IN WATER 50 %
15 SYRINGE (ML) INTRAVENOUS ONCE
Refills: 0 | Status: DISCONTINUED | OUTPATIENT
Start: 2022-01-21 | End: 2022-01-31

## 2022-01-21 RX ORDER — MEROPENEM 1 G/30ML
1000 INJECTION INTRAVENOUS EVERY 8 HOURS
Refills: 0 | Status: COMPLETED | OUTPATIENT
Start: 2022-01-21 | End: 2022-01-28

## 2022-01-21 RX ORDER — INSULIN LISPRO 100/ML
VIAL (ML) SUBCUTANEOUS
Refills: 0 | Status: DISCONTINUED | OUTPATIENT
Start: 2022-01-21 | End: 2022-01-21

## 2022-01-21 RX ORDER — GLUCAGON INJECTION, SOLUTION 0.5 MG/.1ML
1 INJECTION, SOLUTION SUBCUTANEOUS ONCE
Refills: 0 | Status: DISCONTINUED | OUTPATIENT
Start: 2022-01-21 | End: 2022-01-21

## 2022-01-21 RX ORDER — FAMOTIDINE 10 MG/ML
40 INJECTION INTRAVENOUS AT BEDTIME
Refills: 0 | Status: DISCONTINUED | OUTPATIENT
Start: 2022-01-21 | End: 2022-01-31

## 2022-01-21 RX ORDER — REMDESIVIR 5 MG/ML
INJECTION INTRAVENOUS
Refills: 0 | Status: COMPLETED | OUTPATIENT
Start: 2022-01-21 | End: 2022-01-25

## 2022-01-21 RX ORDER — REMDESIVIR 5 MG/ML
100 INJECTION INTRAVENOUS EVERY 24 HOURS
Refills: 0 | Status: COMPLETED | OUTPATIENT
Start: 2022-01-22 | End: 2022-01-25

## 2022-01-21 RX ORDER — ALBUTEROL 90 UG/1
2 AEROSOL, METERED ORAL EVERY 6 HOURS
Refills: 0 | Status: DISCONTINUED | OUTPATIENT
Start: 2022-01-21 | End: 2022-01-31

## 2022-01-21 RX ORDER — GLUCAGON INJECTION, SOLUTION 0.5 MG/.1ML
1 INJECTION, SOLUTION SUBCUTANEOUS ONCE
Refills: 0 | Status: DISCONTINUED | OUTPATIENT
Start: 2022-01-21 | End: 2022-01-31

## 2022-01-21 RX ORDER — SODIUM CHLORIDE 9 MG/ML
1000 INJECTION, SOLUTION INTRAVENOUS ONCE
Refills: 0 | Status: COMPLETED | OUTPATIENT
Start: 2022-01-21 | End: 2022-01-21

## 2022-01-21 RX ORDER — BUDESONIDE AND FORMOTEROL FUMARATE DIHYDRATE 160; 4.5 UG/1; UG/1
2 AEROSOL RESPIRATORY (INHALATION)
Refills: 0 | Status: DISCONTINUED | OUTPATIENT
Start: 2022-01-21 | End: 2022-01-31

## 2022-01-21 RX ORDER — ACETAMINOPHEN 500 MG
975 TABLET ORAL ONCE
Refills: 0 | Status: COMPLETED | OUTPATIENT
Start: 2022-01-21 | End: 2022-01-21

## 2022-01-21 RX ORDER — DEXTROSE 50 % IN WATER 50 %
12.5 SYRINGE (ML) INTRAVENOUS ONCE
Refills: 0 | Status: DISCONTINUED | OUTPATIENT
Start: 2022-01-21 | End: 2022-01-31

## 2022-01-21 RX ORDER — MONTELUKAST 4 MG/1
10 TABLET, CHEWABLE ORAL DAILY
Refills: 0 | Status: DISCONTINUED | OUTPATIENT
Start: 2022-01-21 | End: 2022-01-31

## 2022-01-21 RX ORDER — DEXAMETHASONE 0.5 MG/5ML
6 ELIXIR ORAL DAILY
Refills: 0 | Status: COMPLETED | OUTPATIENT
Start: 2022-01-21 | End: 2022-01-28

## 2022-01-21 RX ADMIN — SODIUM CHLORIDE 1000 MILLILITER(S): 9 INJECTION, SOLUTION INTRAVENOUS at 14:33

## 2022-01-21 RX ADMIN — Medication 5 MILLIGRAM(S): at 21:51

## 2022-01-21 RX ADMIN — KETOTIFEN FUMARATE 1 DROP(S): 0.34 SOLUTION OPHTHALMIC at 21:50

## 2022-01-21 RX ADMIN — SODIUM CHLORIDE 800 MILLILITER(S): 9 INJECTION, SOLUTION INTRAVENOUS at 16:01

## 2022-01-21 RX ADMIN — Medication 6 UNIT(S): at 22:50

## 2022-01-21 RX ADMIN — Medication 975 MILLIGRAM(S): at 14:33

## 2022-01-21 RX ADMIN — REMDESIVIR 500 MILLIGRAM(S): 5 INJECTION INTRAVENOUS at 22:56

## 2022-01-21 RX ADMIN — Medication 975 MILLIGRAM(S): at 15:40

## 2022-01-21 RX ADMIN — Medication 1 SPRAY(S): at 21:50

## 2022-01-21 RX ADMIN — MEROPENEM 1000 MILLIGRAM(S): 1 INJECTION INTRAVENOUS at 14:29

## 2022-01-21 RX ADMIN — Medication 250 MILLIGRAM(S): at 16:00

## 2022-01-21 RX ADMIN — Medication 4: at 22:48

## 2022-01-21 RX ADMIN — INSULIN GLARGINE 14 UNIT(S): 100 INJECTION, SOLUTION SUBCUTANEOUS at 23:13

## 2022-01-21 RX ADMIN — SENNA PLUS 2 TABLET(S): 8.6 TABLET ORAL at 21:51

## 2022-01-21 RX ADMIN — AZITHROMYCIN 255 MILLIGRAM(S): 500 TABLET, FILM COATED ORAL at 14:33

## 2022-01-21 RX ADMIN — MEROPENEM 100 MILLIGRAM(S): 1 INJECTION INTRAVENOUS at 13:58

## 2022-01-21 RX ADMIN — MEROPENEM 100 MILLIGRAM(S): 1 INJECTION INTRAVENOUS at 21:50

## 2022-01-21 RX ADMIN — FAMOTIDINE 40 MILLIGRAM(S): 10 INJECTION INTRAVENOUS at 23:13

## 2022-01-21 RX ADMIN — MIRTAZAPINE 7.5 MILLIGRAM(S): 45 TABLET, ORALLY DISINTEGRATING ORAL at 21:50

## 2022-01-21 RX ADMIN — Medication 1 TABLET(S): at 21:51

## 2022-01-21 RX ADMIN — SODIUM CHLORIDE 1000 MILLILITER(S): 9 INJECTION, SOLUTION INTRAVENOUS at 15:36

## 2022-01-21 RX ADMIN — BUDESONIDE AND FORMOTEROL FUMARATE DIHYDRATE 2 PUFF(S): 160; 4.5 AEROSOL RESPIRATORY (INHALATION) at 21:49

## 2022-01-21 RX ADMIN — AZITHROMYCIN 500 MILLIGRAM(S): 500 TABLET, FILM COATED ORAL at 15:36

## 2022-01-21 NOTE — H&P ADULT - RS GEN PE MLT RESP DETAILS PC
airway patent/breath sounds equal/good air movement/respirations non-labored/no chest wall tenderness/wheezes

## 2022-01-21 NOTE — H&P ADULT - HISTORY OF PRESENT ILLNESS
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 Young (Pulmonologist) for SOB and cough. Pt states her whole family was sick with COVID about 3 weeks ago but she didn't get sick at that time. She states about 2 weeks ago she started to get SOB but over the past 3 days her symptoms were getting worse. Daughter states she is unvaccinated for COVID and was having fever (Tmax 101) with chills, coughing, HA, dizziness and some nausea but denies any chest pain, blurred vision, abd pain, diarrhea, any loss of taste or smell. Daughter states since her symptoms were getting worse they had a tele visit with Hannah today and was advised to come to the [facility] ED for an evaluation. In the [facility] ED, she was found to be COVID positive and was desaturating so her oxygen was increased to 6 LPM NC with improvement of her sats. CXR done showed no evidence of pna. She was seen by [facility] and recommended to start Remdesivir, Dexamethasone, Meropenum and Azithromycin for a superimposed infection with COVID-19. Admitted to medicine.

## 2022-01-21 NOTE — H&P ADULT - PROBLEM SELECTOR PLAN 1
EKG: Sinus tach @ 111, T inv AVL  RVP: COVID Positive           WBC: 12.57            Alk phos: 233  AST/ALT: 59/63          hsTrop: 17               UA neg  1/21 CXR - no radiographic evidence of pneumonia  - Tmax: 100.2  - isolation precautions, trend inflammatory markers  - on supplemental oxygen  - started Remdesivir and Dexamethasone iv 1/21 EKG: Sinus tach @ 111, T inv AVL  RVP: COVID Positive           WBC: 12.57            Alk phos: 233  AST/ALT: 59/63          hsTrop: 17               UA neg  1/21 CXR - no radiographic evidence of pneumonia  - Tmax: 100.2  - isolation precautions, trend inflammatory markers  - on supplemental oxygen, monitor pulse ox  - started Remdesivir and Dexamethasone iv 1/21  - cont avaps at night

## 2022-01-21 NOTE — REVIEW OF SYSTEMS
[Fever] : fever [Fatigue] : fatigue [Chills] : chills [Recent Wt Loss (___ Lbs)] : ~T recent [unfilled] lb weight loss [Headache] : headache [Negative] : Endocrine

## 2022-01-21 NOTE — H&P ADULT - ASSESSMENT
Relevant Problems   No relevant active problems       Anesthetic History   No history of anesthetic complications            Review of Systems / Medical History  Patient summary reviewed and pertinent labs reviewed    Pulmonary  Within defined limits                 Neuro/Psych   Within defined limits           Cardiovascular    Hypertension: well controlled              Exercise tolerance: >4 METS     GI/Hepatic/Renal                Endo/Other        Morbid obesity and arthritis     Other Findings              Physical Exam    Airway  Mallampati: III  TM Distance: 4 - 6 cm  Neck ROM: decreased range of motion   Mouth opening: Normal     Cardiovascular    Rhythm: regular  Rate: normal         Dental  No notable dental hx       Pulmonary  Breath sounds clear to auscultation               Abdominal  GI exam deferred       Other Findings            Anesthetic Plan    ASA: 2  Anesthesia type: general and regional - interscalene block            Anesthetic plan and risks discussed with: Patient
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 Young (Pulmonologist) for SOB and cough. Admitted to medicine for acute hypoxemia from COVID-19 with a superimposed bacterial pneumonia.

## 2022-01-21 NOTE — CONSULT NOTE ADULT - ASSESSMENT
Patient is a a 67 yo F w/ Bronchiectasis, chronic hypercapnic/hypoxemic respiratory failure (2 to 5L O2/IVAPS - RR 18, EPAP 5, PS 15-25, average TV is 443), Liver Cirrohsis with prior CBD stricture s/p dilation and HCV ab+ who presents with fevers, productive sputum and wheezing, found to be COVID positive.     #Acute on Chronic hypoxemic/hypercapnic respiratory failure - Acute bronchiectasis flare 2/2 COVID infection. Patient has self medicated with Augmentin and Prednisone 40mg daily for 3 days PTA with frequent use of albuterol up to 5 times per day. Reports resolution of fevers for the past 3 days. COVID PCR positive. CXR today showed diffuse bilateral coarse reticular markings grossly unchanged from prior CXR. In the ED, patient received Angelica, Vanc, Azithro and 2L LR.   - Duonebs q6h ATC/3% Saline/Aerobika for airway clearance  - Would treat with Decadron 6mg PO daily for 7 additional days as patient already took Pred 40mg x 3 days  - Would treat with Remdesivir as O2 requirements are slightly increased  - Check sputum cultures  - Can c/w Meropenem and Azithromycin for now  - Check urine legionella  - c/w supplemental O2 to keep SO2 > 88%   - c/w NIPPV QHS, can use AVAPS - RR 18, EPAP 5, Min IP 15, Max IP 25,     Caleb Bailey MD  Pulmonary & Critical Care Fellow  [phone]

## 2022-01-21 NOTE — H&P ADULT - NSICDXFAMILYHX_GEN_ALL_CORE_FT
FAMILY HISTORY:  Family history of bronchitis, mother, father  Family history of diabetes mellitus, father    Child  Still living? Unknown  Family history of allergic rhinitis, Age at diagnosis: Age Unknown  Family history of allergic rhinitis, Age at diagnosis: Age Unknown

## 2022-01-21 NOTE — CONSULT NOTE ADULT - TIME BILLING
reviewing chart and coordinating care with primary team/staff, as well as reviewing vitals, radiology, medication list, recent labs, and prior records.

## 2022-01-21 NOTE — H&P ADULT - NSICDXPASTMEDICALHX_GEN_ALL_CORE_FT
PAST MEDICAL HISTORY:  ABPA (allergic bronchopulmonary aspergillosis)     Allergic rhinitis     Anxiety and depression     Bronchiectasis     GERD (gastroesophageal reflux disease)     Hypertension     Microcytic anemia     Postnasal drip     Pseudomonas aeruginosa colonization     Recurrent pneumonia     Type 2 diabetes mellitus, with long-term current use of insulin     Vitamin D deficiency

## 2022-01-21 NOTE — ED PROVIDER NOTE - OBJECTIVE STATEMENT
Pt was offered translation services but preferred daughter to translate  65 y/o female with PMHx of DM type 2, Bronchiectasis on 4L NC at home, GERD, HTN, and Pseudomonal Aeruginosa colonization who presented to the ED for cough and SOB X 1 wk. Pt states over the past wk having increased SOB with productive cough and tactile fevers. Pt is unvaccinated for COVID. Pt denies any chest pain but admits to achy abd pain. Pt had a tele visit by their pulmonologist who felt pt needed to come in for IV antibiotics.

## 2022-01-21 NOTE — H&P ADULT - NSHPPHYSICALEXAM_GEN_ALL_CORE
Vital Signs Last 24 Hrs  T(C): 37 (21 Jan 2022 18:03), Max: 37.9 (21 Jan 2022 12:51)  T(F): 98.6 (21 Jan 2022 18:03), Max: 100.2 (21 Jan 2022 12:51)  HR: 75 (21 Jan 2022 18:03) (75 - 115)  BP: 135/76 (21 Jan 2022 18:03) (135/76 - 168/90)  BP(mean): --  RR: 19 (21 Jan 2022 18:03) (19 - 34)  SpO2: 96% (21 Jan 2022 18:03) (95% - 100%)    EKG:  Sinus tach @ 111, T inv AVL

## 2022-01-21 NOTE — H&P ADULT - NEGATIVE OPHTHALMOLOGIC SYMPTOMS
n/a
no diplopia/no photophobia/no blurred vision L/no blurred vision R/no loss of vision L/no loss of vision R

## 2022-01-21 NOTE — ED PROVIDER NOTE - CARE PLAN
1 Principal Discharge DX:	Pneumonia  Secondary Diagnosis:	Bronchiectasis  Secondary Diagnosis:	2019 novel coronavirus disease (COVID-19)

## 2022-01-21 NOTE — ED ADULT TRIAGE NOTE - CHIEF COMPLAINT QUOTE
Pt's daughter states that pt has had worsening abd pain, cough and SOB x 1 week, family also states that pt has been losing weight for the past 2 months.  PMH COPD O2 dependant, HTN, DM, microcytic anemia.  Daughter states that pt did not get Covid vaccination "because she is on too many medications"

## 2022-01-21 NOTE — ASSESSMENT
[FreeTextEntry1] : Ms. Sky is a 66 year old woman with probable PCD, characterized by sinusitis, bronchiectasis and recurrent infections. Has grown hemophilus influenza in January 2020 which was intermediate to Cipro. Last hospitalized at the end of 7/2021 with acute on chronic hypercapnic respiratory failure and was treated for pseudomonas infection which was sensitive to Levaquin. ABG on 6/29/21 was 7.28/92/109 and after wearing nocturnal ventilation she improved to 7.39/76/53. She is on a bronchodilator regimen and a clearance regimen. She has stopped taking inhaled tobramycin due to reports of swelling and discomfort but  used amikacin this month.  She reports a tremulous sensation and soreness in her mouth.  She requires nocturnal ventilation for chronic hypercapnic respiratory failure and demonstrated improvement in CO2 levels while wearing it in the hospital. Continues to wear it at home nocturnally and reports feeling better. \par Currently with a new infection.  Reports fever at home, cough and wheezing with mucopurulent secretions, has self medicated with augmentin and prednisone and not improving.  ONgoing concern is her weight loss.  85 lbs today, down from 92 in september.  She has not been vaccinated for COVID.  took self test at home which was negative. \par \par Plan:\par 1- refer for admission for bronchiectasis exacerbation.  Will need ABG, sputum cultures, imaging, and empiric treatment for pseudomonas.  Continue clearance regimen, symbicort and albuterol as needed.\par Will need ivaps nocturnally and COVID swab. \par 2- would consult nutrition, consider starting mirtazapine for appetite stimulation.  Will need to discuss with hepatology.  Synthetic function appears good.  May need additional workup to better define diagnosis of cirrhosis.  \par \par Spoke with pulmonary consult team at [facility] and ER resident there to coordinate her care.  \par

## 2022-01-21 NOTE — ED ADULT NURSE NOTE - OBJECTIVE STATEMENT
66 year old female received to 66 year old female received to  6 [facility] speaking only, AAOx3 ambulatory with 1 assist. Pt is unvaccinated, sent in by pulmonologist c/o shortness of breath, productive cough (clear/white sputum noted), subjective fevers, loss of weight x 2 wks. Pt presents on 2.5L via NC, respirations labored and tachypneic 28-40 sating 95%. Pt placed on 5L via NC sating 98% now. Sinus tachycardia on cardiac monitor. Pt endorses diffuse abd pain. Abd soft, flat, nontender to palpation. Pt denies headache, dizziness, chest pain, n/v/d. Bruising noted to upper extremities d/t AC use. No swelling noted to BLLE. Skin otherwise intact, warm, dry. PIV #20 left forearm, labs drawn and sent. Covid swab sent. VS as noted. Medicated pt per MD orders. Bed in lowest position, call bell within reach.

## 2022-01-21 NOTE — H&P ADULT - NSHPSOCIALHISTORY_GEN_ALL_CORE
Marital Status:     Occupation: Homemaker    Tobacco Use: denies    ETOH Use: denies    Drug Use: denies    Flu Vaccine: denies                       Pneumonia Vaccine: denies                              COVID Vaccine: denies

## 2022-01-21 NOTE — CONSULT NOTE ADULT - SUBJECTIVE AND OBJECTIVE BOX
CHIEF COMPLAINT:    HPI: Patient is a a 65 yo F w/ Bronchiectasis, chronic hypercapnic/hypoxemic respiratory failure (3L O2/IVAPS - RR 18, EPAP 5, PS 15-25, average TV is 443), Liver Cirrohsis with prior CBD stricture s/p dilation and HCV ab+ who presents with fevers, productive sputum and wheezing. Patient was evaluated by her pulmonologist Dr. Young via televisit earlier today who advised her to present to the ED for further evaluation. Patient has self medicated with Augmentin and Prednisone 40mg daily with frequent use of albuterol. Also reported recent weight loss     Labs notable for leukocytosis with WBC 12.57, Hb 15.4, K 5 (hemolyzed), Bicarb 32, Creatinine 0.37, Alk P 233, AST 59, ALT 63, VBG 7.31/74/21. COVID PCR positive. CXR today showed diffuse bilateral coarse reticular markings grossly unchanged from prior CXR. In the ED, patient received Angelica, Vanc, Azithro and 2L LR.     Of note, her airway regimen usually consists of Albuterol/3% Saline/Aerobika TID, Spiriva Daily and Symbicort BID. Prior sputum cultures with Pseudomonas which has been carbepenem resistant although recent ones are sensitive.       PAST MEDICAL & SURGICAL HISTORY:  ABPA (allergic bronchopulmonary aspergillosis)    Bronchiectasis    Hypertension    Vitamin D deficiency    Microcytic anemia    GERD (gastroesophageal reflux disease)    Postnasal drip    Pseudomonas aeruginosa colonization    Allergic rhinitis    Recurrent pneumonia    Type 2 diabetes mellitus, with long-term current use of insulin    History of cholecystectomy        FAMILY HISTORY:  Family history of diabetes mellitus  father    Family history of allergic rhinitis (Child, Child)  son, daughter    Family history of bronchitis  parents        SOCIAL HISTORY:  Smoking: [ ] Never Smoked [ ] Former Smoker (__ packs x ___ years) [ ] Current Smoker  (__ packs x ___ years)  Substance Use: [ ] Never Used [ ] Used ____  EtOH Use:  Marital Status: [ ] Single [ ]  [ ]  [ ]   Sexual History:   Occupation:  Recent Travel:  Country of Birth:  Advance Directives:    Allergies    No Known Allergies    Intolerances        HOME MEDICATIONS:  Home Medications:  amLODIPine 5 mg oral tablet: 1 tab(s) orally once a day (2021 18:48)  cyanocobalamin 1000 mcg/mL injectable solution: 1000 microgram(s) injectable every 7 days on Wednesday (2021 18:48)  famotidine 40 mg oral tablet: 1 tab(s) orally once a day (at bedtime) (2021 18:48)  ferrous gluconate 324 mg (37.5 mg elemental iron) oral tablet: 1 tab(s) orally 2 times a day (2021 18:48)  Flonase 50 mcg/inh nasal spray: 1 spray(s) in each nostril once a day (2021 18:48)  mirtazapine 7.5 mg oral tablet: 1 tab(s) orally once a day (at bedtime) (2021 18:48)  montelukast 10 mg oral tablet: 1 tab(s) orally once a day (2021 18:48)  NebuSal: 4 milliliter(s) inhaled 2 times a day (2021 18:48)  omeprazole 20 mg oral delayed release capsule: 1 cap(s) orally once a day (2021 18:48)  Spiriva 18 mcg inhalation capsule: 1 cap(s) inhaled once a day (2021 18:48)  Symbicort 160 mcg-4.5 mcg/inh inhalation aerosol: 2 puff(s) inhaled 2 times a day (2021 18:48)  tobramycin 75 mg/mL inhalation solution: 4 milliliter(s) inhaled every 8 weeks (2021 18:48)  Vitamin C 500 mg oral tablet: 1 tab(s) orally once a day (2021 18:48)      REVIEW OF SYSTEMS:  Constitutional: [ ] negative [ ] fevers [ ] chills [ ] weight loss [ ] weight gain  HEENT: [ ] negative [ ] dry eyes [ ] eye irritation [ ] postnasal drip [ ] nasal congestion  CV: [ ] negative  [ ] chest pain [ ] orthopnea [ ] palpitations [ ] murmur  Resp: [ ] negative [ ] cough [ ] shortness of breath [ ] dyspnea [ ] wheezing [ ] sputum [ ] hemoptysis  GI: [ ] negative [ ] nausea [ ] vomiting [ ] diarrhea [ ] constipation [ ] abd pain [ ] dysphagia   : [ ] negative [ ] dysuria [ ] nocturia [ ] hematuria [ ] increased urinary frequency  Musculoskeletal: [ ] negative [ ] back pain [ ] myalgias [ ] arthralgias [ ] fracture  Skin: [ ] negative [ ] rash [ ] itch  Neurological: [ ] negative [ ] headache [ ] dizziness [ ] syncope [ ] weakness [ ] numbness  Psychiatric: [ ] negative [ ] anxiety [ ] depression  Endocrine: [ ] negative [ ] diabetes [ ] thyroid problem  Hematologic/Lymphatic: [ ] negative [ ] anemia [ ] bleeding problem  Allergic/Immunologic: [ ] negative [ ] itchy eyes [ ] nasal discharge [ ] hives [ ] angioedema  [ ] All other systems negative  [ ] Unable to assess ROS because ________    OBJECTIVE:  ICU Vital Signs Last 24 Hrs  T(C): 36.8 (2022 16:08), Max: 37.9 (2022 12:51)  T(F): 98.2 (2022 16:08), Max: 100.2 (2022 12:51)  HR: 78 (2022 16:08) (78 - 115)  BP: 158/87 (2022 16:08) (144/74 - 168/90)  BP(mean): --  ABP: --  ABP(mean): --  RR: 22 (2022 16:08) (22 - 34)  SpO2: 100% (2022 16:08) (95% - 100%)        CAPILLARY BLOOD GLUCOSE      POCT Blood Glucose.: 188 mg/dL (2022 12:48)      PHYSICAL EXAM:  General:   HEENT:   Lymph Nodes:  Neck:   Respiratory:   Cardiovascular:   Abdomen:   Extremities:   Skin:   Neurological:  Psychiatry:    LINES:     HOSPITAL MEDICATIONS:  Standing Meds:      PRN Meds:      LABS:                        15.4   12.57 )-----------( 182      ( 2022 14:26 )             48.6     Hgb Trend: 15.4<--  01-21    135  |  95<L>  |  10  ----------------------------<  121<H>  5.0   |  32<H>  |  0.37<L>    Ca    9.6      2022 14:26    TPro  8.6<H>  /  Alb  4.0  /  TBili  <0.2  /  DBili  x   /  AST  59<H>  /  ALT  63<H>  /  AlkPhos  233<H>      Creatinine Trend: 0.37<--  PT/INR - ( 2022 14:26 )   PT: 10.8 sec;   INR: 0.94 ratio         PTT - ( 2022 14:26 )  PTT:26.7 sec  Urinalysis Basic - ( 2022 14:26 )    Color: Light Yellow / Appearance: Clear / S.012 / pH: x  Gluc: x / Ketone: Negative  / Bili: Negative / Urobili: <2 mg/dL   Blood: x / Protein: Trace / Nitrite: Negative   Leuk Esterase: Negative / RBC: 1 /HPF / WBC 1 /HPF   Sq Epi: x / Non Sq Epi: 0 /HPF / Bacteria: Negative        Venous Blood Gas:   @ 14:26  7.31/74/21/37/27.9  VBG Lactate: 1.9      MICROBIOLOGY:       RADIOLOGY:  [ ] Reviewed and interpreted by me    PULMONARY FUNCTION TESTS:    EKG: CHIEF COMPLAINT:    HPI: Declined phone  service. Daughter translating via phone. Patient is a a 65 yo F w/ Bronchiectasis, chronic hypercapnic/hypoxemic respiratory failure (2 to 5L O2/IVAPS - RR 18, EPAP 5, PS 15-25, average TV is 443), Liver Cirrohsis with prior CBD stricture s/p dilation and HCV ab+ who presents with fevers, productive sputum and wheezing. Patient was evaluated by her pulmonologist Dr. Young via televisit earlier today who advised her to present to the ED for further evaluation. Patient has self medicated with Augmentin and Prednisone 40mg daily for 3 days PTA with frequent use of albuterol up to 5 times per day. Also endorses change in chronic sputum (usually yellow/green but now brown recently) and reported recent weight loss     Labs notable for leukocytosis with WBC 12.57, Hb 15.4, K 5 (hemolyzed), Bicarb 32, Creatinine 0.37, Alk P 233, AST 59, ALT 63, VBG 7.31/74/21. COVID PCR positive. CXR today showed diffuse bilateral coarse reticular markings grossly unchanged from prior CXR. In the ED, patient received Angelica, Vanc, Azithro and 2L LR.     Of note, her airway regimen usually consists of Albuterol/3% Saline/Aerobika TID, Spiriva Daily and Symbicort BID. Prior sputum cultures with Pseudomonas which has been carbepenem resistant although recent ones are sensitive.       PAST MEDICAL & SURGICAL HISTORY:  ABPA (allergic bronchopulmonary aspergillosis)    Bronchiectasis    Hypertension    Vitamin D deficiency    Microcytic anemia    GERD (gastroesophageal reflux disease)    Postnasal drip    Pseudomonas aeruginosa colonization    Allergic rhinitis    Recurrent pneumonia    Type 2 diabetes mellitus, with long-term current use of insulin    History of cholecystectomy        FAMILY HISTORY:  Family history of diabetes mellitus  father    Family history of allergic rhinitis (Child, Child)  son, daughter    Family history of bronchitis  parents        SOCIAL HISTORY:  Smoking: [X] Never Smoked [ ] Former Smoker (__ packs x ___ years) [ ] Current Smoker  (__ packs x ___ years)  Substance Use: [ ] Never Used [ ] Used ____  EtOH Use:  Marital Status: [ ] Single [ ]  [ ]  [ ]   Sexual History:   Occupation:  Recent Travel:  Country of Birth:  Advance Directives:    Allergies    No Known Allergies    Intolerances        HOME MEDICATIONS:  Home Medications:  amLODIPine 5 mg oral tablet: 1 tab(s) orally once a day (2021 18:48)  cyanocobalamin 1000 mcg/mL injectable solution: 1000 microgram(s) injectable every 7 days on Wednesday (2021 18:48)  famotidine 40 mg oral tablet: 1 tab(s) orally once a day (at bedtime) (2021 18:48)  ferrous gluconate 324 mg (37.5 mg elemental iron) oral tablet: 1 tab(s) orally 2 times a day (2021 18:48)  Flonase 50 mcg/inh nasal spray: 1 spray(s) in each nostril once a day (2021 18:48)  mirtazapine 7.5 mg oral tablet: 1 tab(s) orally once a day (at bedtime) (2021 18:48)  montelukast 10 mg oral tablet: 1 tab(s) orally once a day (2021 18:48)  NebuSal: 4 milliliter(s) inhaled 2 times a day (2021 18:48)  omeprazole 20 mg oral delayed release capsule: 1 cap(s) orally once a day (2021 18:48)  Spiriva 18 mcg inhalation capsule: 1 cap(s) inhaled once a day (2021 18:48)  Symbicort 160 mcg-4.5 mcg/inh inhalation aerosol: 2 puff(s) inhaled 2 times a day (2021 18:48)  tobramycin 75 mg/mL inhalation solution: 4 milliliter(s) inhaled every 8 weeks (2021 18:48)  Vitamin C 500 mg oral tablet: 1 tab(s) orally once a day (2021 18:48)      REVIEW OF SYSTEMS:  Constitutional: [ ] negative [X] fevers [ ] chills [X] weight loss [ ] weight gain  HEENT: [ ] negative [ ] dry eyes [ ] eye irritation [ ] postnasal drip [ ] nasal congestion  CV: [ ] negative  [ ] chest pain [ ] orthopnea [ ] palpitations [ ] murmur  Resp: [ ] negative [X] cough [ ] shortness of breath [ ] dyspnea [X] wheezing [X] sputum [ ] hemoptysis  GI: [ ] negative [ ] nausea [ ] vomiting [ ] diarrhea [ ] constipation [ ] abd pain [ ] dysphagia   : [ ] negative [ ] dysuria [ ] nocturia [ ] hematuria [ ] increased urinary frequency  Musculoskeletal: [ ] negative [ ] back pain [ ] myalgias [ ] arthralgias [ ] fracture  Skin: [ ] negative [ ] rash [ ] itch  Neurological: [ ] negative [ ] headache [ ] dizziness [ ] syncope [ ] weakness [ ] numbness  Psychiatric: [ ] negative [ ] anxiety [ ] depression  Endocrine: [ ] negative [ ] diabetes [ ] thyroid problem  Hematologic/Lymphatic: [ ] negative [ ] anemia [ ] bleeding problem  Allergic/Immunologic: [ ] negative [ ] itchy eyes [ ] nasal discharge [ ] hives [ ] angioedema  [ ] All other systems negative  [ ] Unable to assess ROS because ________    OBJECTIVE:  ICU Vital Signs Last 24 Hrs  T(C): 36.8 (2022 16:08), Max: 37.9 (2022 12:51)  T(F): 98.2 (2022 16:08), Max: 100.2 (2022 12:51)  HR: 78 (2022 16:08) (78 - 115)  BP: 158/87 (2022 16:08) (144/74 - 168/90)  BP(mean): --  ABP: --  ABP(mean): --  RR: 22 (2022 16:08) (22 - 34)  SpO2: 100% (2022 16:08) (95% - 100%)        CAPILLARY BLOOD GLUCOSE      POCT Blood Glucose.: 188 mg/dL (2022 12:48)      PHYSICAL EXAM:  General: Frail, Thin, NAD  HEENT: EOMI, PERRLA  Respiratory: Scattered crackles and wheezes bilaterally  Cardiovascular: S1S2, RRR  Abdomen: Soft, NTND, BS+  Extremities: No edema  Skin: Warm and dry    LINES:     HOSPITAL MEDICATIONS:  Standing Meds:      PRN Meds:      LABS:                        15.4   12.57 )-----------( 182      ( 2022 14:26 )             48.6     Hgb Trend: 15.4<--  01-21    135  |  95<L>  |  10  ----------------------------<  121<H>  5.0   |  32<H>  |  0.37<L>    Ca    9.6      2022 14:26    TPro  8.6<H>  /  Alb  4.0  /  TBili  <0.2  /  DBili  x   /  AST  59<H>  /  ALT  63<H>  /  AlkPhos  233<H>      Creatinine Trend: 0.37<--  PT/INR - ( 2022 14:26 )   PT: 10.8 sec;   INR: 0.94 ratio         PTT - ( 2022 14:26 )  PTT:26.7 sec  Urinalysis Basic - ( 2022 14:26 )    Color: Light Yellow / Appearance: Clear / S.012 / pH: x  Gluc: x / Ketone: Negative  / Bili: Negative / Urobili: <2 mg/dL   Blood: x / Protein: Trace / Nitrite: Negative   Leuk Esterase: Negative / RBC: 1 /HPF / WBC 1 /HPF   Sq Epi: x / Non Sq Epi: 0 /HPF / Bacteria: Negative        Venous Blood Gas:   @ 14:26  7.31/74/21/37/27.9  VBG Lactate: 1.9      MICROBIOLOGY:       RADIOLOGY:  [ ] Reviewed and interpreted by me    PULMONARY FUNCTION TESTS:    EKG: CHIEF COMPLAINT: sob    HPI: Declined phone  service. Daughter translating via phone. Patient is a a 65 yo F w/ Bronchiectasis, chronic hypercapnic/hypoxemic respiratory failure (2 to 5L O2/IVAPS - RR 18, EPAP 5, PS 15-25, average TV is 443), Liver Cirrohsis with prior CBD stricture s/p dilation and HCV ab+ who presents with fevers, productive sputum and wheezing. Patient was evaluated by her pulmonologist Dr. Young via televisit earlier today who advised her to present to the ED for further evaluation. Patient has self medicated with Augmentin and Prednisone 40mg daily for 3 days PTA with frequent use of albuterol up to 5 times per day. Also endorses change in chronic sputum (usually yellow/green but now brown recently) and reported recent weight loss     Labs notable for leukocytosis with WBC 12.57, Hb 15.4, K 5 (hemolyzed), Bicarb 32, Creatinine 0.37, Alk P 233, AST 59, ALT 63, VBG 7.31/74/21. COVID PCR positive. CXR today showed diffuse bilateral coarse reticular markings grossly unchanged from prior CXR. In the ED, patient received Angelica, Vanc, Azithro and 2L LR.     Of note, her airway regimen usually consists of Albuterol/3% Saline/Aerobika TID, Spiriva Daily and Symbicort BID. Prior sputum cultures with Pseudomonas which has been carbepenem resistant although recent ones are sensitive.       PAST MEDICAL & SURGICAL HISTORY:  ABPA (allergic bronchopulmonary aspergillosis)    Bronchiectasis    Hypertension    Vitamin D deficiency    Microcytic anemia    GERD (gastroesophageal reflux disease)    Postnasal drip    Pseudomonas aeruginosa colonization    Allergic rhinitis    Recurrent pneumonia    Type 2 diabetes mellitus, with long-term current use of insulin    History of cholecystectomy        FAMILY HISTORY:  Family history of diabetes mellitus  father    Family history of allergic rhinitis (Child, Child)  son, daughter    Family history of bronchitis  parents        SOCIAL HISTORY:  Smoking: [X] Never Smoked [ ] Former Smoker (__ packs x ___ years) [ ] Current Smoker  (__ packs x ___ years)  Substance Use: [ ] Never Used [ ] Used ____  EtOH Use: no etoh  Marital Status: [ ] Single [ ]  [ ]  [ ]   Sexual History:   Occupation:  Recent Travel:  Country of Birth:  Advance Directives:    Allergies    No Known Allergies    Intolerances        HOME MEDICATIONS:  Home Medications:  amLODIPine 5 mg oral tablet: 1 tab(s) orally once a day (2021 18:48)  cyanocobalamin 1000 mcg/mL injectable solution: 1000 microgram(s) injectable every 7 days on Wednesday (2021 18:48)  famotidine 40 mg oral tablet: 1 tab(s) orally once a day (at bedtime) (2021 18:48)  ferrous gluconate 324 mg (37.5 mg elemental iron) oral tablet: 1 tab(s) orally 2 times a day (2021 18:48)  Flonase 50 mcg/inh nasal spray: 1 spray(s) in each nostril once a day (2021 18:48)  mirtazapine 7.5 mg oral tablet: 1 tab(s) orally once a day (at bedtime) (2021 18:48)  montelukast 10 mg oral tablet: 1 tab(s) orally once a day (2021 18:48)  NebuSal: 4 milliliter(s) inhaled 2 times a day (2021 18:48)  omeprazole 20 mg oral delayed release capsule: 1 cap(s) orally once a day (2021 18:48)  Spiriva 18 mcg inhalation capsule: 1 cap(s) inhaled once a day (2021 18:48)  Symbicort 160 mcg-4.5 mcg/inh inhalation aerosol: 2 puff(s) inhaled 2 times a day (2021 18:48)  tobramycin 75 mg/mL inhalation solution: 4 milliliter(s) inhaled every 8 weeks (2021 18:48)  Vitamin C 500 mg oral tablet: 1 tab(s) orally once a day (2021 18:48)      REVIEW OF SYSTEMS:  Constitutional: [ ] negative [X] fevers [ ] chills [X] weight loss [ ] weight gain  HEENT: [ ] negative [ ] dry eyes [ ] eye irritation [ ] postnasal drip [ ] nasal congestion  CV: [ ] negative  [ ] chest pain [ ] orthopnea [ ] palpitations [ ] murmur  Resp: [ ] negative [X] cough [ ] shortness of breath [ ] dyspnea [X] wheezing [X] sputum [ ] hemoptysis  GI: [ ] negative [ ] nausea [ ] vomiting [ ] diarrhea [ ] constipation [ ] abd pain [ ] dysphagia   : [ ] negative [ ] dysuria [ ] nocturia [ ] hematuria [ ] increased urinary frequency  Musculoskeletal: [ ] negative [ ] back pain [ ] myalgias [ ] arthralgias [ ] fracture  Skin: [ ] negative [ ] rash [ ] itch  Neurological: [ ] negative [ ] headache [ ] dizziness [ ] syncope [ ] weakness [ ] numbness  Psychiatric: [ ] negative [ ] anxiety [ ] depression  Endocrine: [ ] negative [ ] diabetes [ ] thyroid problem  Hematologic/Lymphatic: [ ] negative [ ] anemia [ ] bleeding problem  Allergic/Immunologic: [ ] negative [ ] itchy eyes [ ] nasal discharge [ ] hives [ ] angioedema  [x ] All other systems negative  [ ] Unable to assess ROS because ________    OBJECTIVE:  ICU Vital Signs Last 24 Hrs  T(C): 36.8 (2022 16:08), Max: 37.9 (2022 12:51)  T(F): 98.2 (2022 16:08), Max: 100.2 (2022 12:51)  HR: 78 (2022 16:08) (78 - 115)  BP: 158/87 (2022 16:08) (144/74 - 168/90)  BP(mean): --  ABP: --  ABP(mean): --  RR: 22 (2022 16:08) (22 - 34)  SpO2: 100% (2022 16:08) (95% - 100%)        CAPILLARY BLOOD GLUCOSE      POCT Blood Glucose.: 188 mg/dL (2022 12:48)      PHYSICAL EXAM:  General: Frail, Thin, NAD  HEENT: EOMI, PERRLA  Respiratory: Scattered crackles and wheezes bilaterally  Cardiovascular: S1S2, RRR, no murmurs  Abdomen: Soft, NTND, BS+  Extremities: No edema, no cyanosis  Skin: Warm and dry, normal turgor  psych: not anxious    LINES:     HOSPITAL MEDICATIONS:  Standing Meds:      PRN Meds:      LABS:                        15.4   12.57 )-----------( 182      ( 2022 14:26 )             48.6     Hgb Trend: 15.4<--  01-21    135  |  95<L>  |  10  ----------------------------<  121<H>  5.0   |  32<H>  |  0.37<L>    Ca    9.6      2022 14:26    TPro  8.6<H>  /  Alb  4.0  /  TBili  <0.2  /  DBili  x   /  AST  59<H>  /  ALT  63<H>  /  AlkPhos  233<H>      Creatinine Trend: 0.37<--  PT/INR - ( 2022 14:26 )   PT: 10.8 sec;   INR: 0.94 ratio         PTT - ( 2022 14:26 )  PTT:26.7 sec  Urinalysis Basic - ( 2022 14:26 )    Color: Light Yellow / Appearance: Clear / S.012 / pH: x  Gluc: x / Ketone: Negative  / Bili: Negative / Urobili: <2 mg/dL   Blood: x / Protein: Trace / Nitrite: Negative   Leuk Esterase: Negative / RBC: 1 /HPF / WBC 1 /HPF   Sq Epi: x / Non Sq Epi: 0 /HPF / Bacteria: Negative        Venous Blood Gas:   @ 14:26  7.31/74/21/37/27.9  VBG Lactate: 1.9      MICROBIOLOGY:       RADIOLOGY:  [x ] Reviewed and interpreted by me  cxr reticular markings, no focal consolidations  PULMONARY FUNCTION TESTS:    EKG:

## 2022-01-21 NOTE — H&P ADULT - ATTENDING COMMENTS
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 Young (Pulmonologist) for SOB and cough. Pt states her whole family was sick with COVID about 3 weeks ago but she didn't get sick at that time. She states about 2 weeks ago she started to get SOB but over the past 3 days her symptoms were getting worse. Daughter states she is unvaccinated for COVID and was having fever (Tmax 101) with chills, coughing, HA, dizziness and some nausea but denies any chest pain, blurred vision, abd pain, diarrhea, any loss of taste or smell. Daughter states since her symptoms were getting worse they had a tele visit with Hannah today and was advised to come to the [facility] ED for an evaluation. In the [facility] ED, she was found to be COVID positive and was desaturating so her oxygen was increased to 6 LPM NC with improvement of her sats. CXR done showed no evidence of pna. She was seen by [facility] and recommended to start Remdesivir, Dexamethasone, Meropenum and Azithromycin for a superimposed infection with COVID-19. Admitted to medicine. 66 year old Leandro speaking female (daughter provided translation) with past medical history of diffuse cystic bronchiectasis and primary ciliary dyskinesia on 2LQD and AVAPS QHS, chronic sinusitis, ABPA unclear history aspergillus AB negative from 8/2020, HTN, GERD, IDDM2, Nodular liver and CBD stricture s/p PTC (1998 in [place] now removed) and recent admission in April 2021 for  Pseudomonas PNA and July 2021 for Pseudomonas PNA admitted for acute hypoxic respiratory failure 2/2 to COVID and possible bacterial superinfection. Patient admitted for further management with pulmonary following.    #Acute on Chronic Hypoxemic Respiratory failure w/ Acute Bronchiectasis Flare   - remdesivir and dexamethasone   - pulmonary recs appreciated   - meropenem and azithro per pulm   - pulmonary recs appreciated    #Diabetes   - not well controlled A1C elevated, Lantus 14u w/ 6 u pre-meal  - MISS   - goal -180   - adjust prn     Rest of plan as detailed above

## 2022-01-21 NOTE — ED PROVIDER NOTE - CLINICAL SUMMARY MEDICAL DECISION MAKING FREE TEXT BOX
65 y/o female with PMHx of DM type 2, ABPA, Bronchiectasis, GERD, HTN, and Pseudomonal Aeruginosa colonization who presented to the ED for cough and SOB X 1 wk.   Concern for PNA/URI/COVID  Labs, EKG, CXR, Antibiotics

## 2022-01-21 NOTE — ED PROVIDER NOTE - ATTENDING CONTRIBUTION TO CARE
Pt was offered translation services but preferred daughter to translate  Pt was seen and evaluated by me. Pt is a 65 y/o female with PMHx of DM type 2, Bronchiectasis on 4L NC at home, GERD, HTN, and Pseudomonal Aeruginosa colonization who presented to the ED for cough and SOB X 1 wk. Pt states over the past wk having increased SOB with productive cough and tactile fevers. Pt is unvaccinated for COVID. Pt denies any chest pain but admits to achy abd pain. Pt had a tele visit by their pulmonologist who felt pt needed to come in for IV antibiotics. Thin appearing female. Tachy. Diffuse rhonchi. Abd soft, non-tender. No LE swelling or calf tenderness.  Concern for PNA/URI/COVID  Labs, EKG, CXR, Antibiotics

## 2022-01-21 NOTE — H&P ADULT - PROBLEM SELECTOR PLAN 4
Glucose: 121          HgbA1-c: 9.4  - monitor fs, moderate insulin ssc  - cont pre-meal and basal insulin

## 2022-01-21 NOTE — CONSULT NOTE ADULT - ATTENDING COMMENTS
66 F with bronchiectasis in past with chronic hypercapnic respiratory failure here with productive cough and sputum found to have covid and possible bronchiectasis exacerbation.    # acute on chronic hypercapnic hypoxic respiratory failure  # bronchiectasis exacerbation  # covid 19  - c/w supplemental o2 as tolerated, start avaps qhs and prn  - would treat for covid; start decadron as above, consider remdesivir  - c/w abx for bronchiectasis exacerbation, has pseudomonas in past, please check sputum culture

## 2022-01-21 NOTE — ED CLERICAL - NS ED CLERK NOTE PRE-ARRIVAL INFORMATION; ADDITIONAL PRE-ARRIVAL INFORMATION
Patient with pmh severe bronchiectasis, not vaccinated for covid, who was seen for telemedicine visit.  Having fevers, cough, sputum production.  Taking amoxicillin on own.  Concern for resistant pseudomonas infection given history, and need for admission and IV abx.

## 2022-01-21 NOTE — ED ADULT NURSE NOTE - NSIMPLEMENTINTERV_GEN_ALL_ED
Implemented All Fall with Harm Risk Interventions:  [place] to call system. Call bell, personal items and telephone within reach. Instruct patient to call for assistance. Room bathroom lighting operational. Non-slip footwear when patient is off stretcher. Physically safe environment: no spills, clutter or unnecessary equipment. Stretcher in lowest position, wheels locked, appropriate side rails in place. Provide visual cue, wrist band, yellow gown, etc. Monitor gait and stability. Monitor for mental status changes and reorient to person, place, and time. Review medications for side effects contributing to fall risk. Reinforce activity limits and safety measures with patient and family. Provide visual clues: red socks.

## 2022-01-21 NOTE — HISTORY OF PRESENT ILLNESS
[Home] : at home, [unfilled] , at the time of the visit. [Medical Office: ([facility])___] : at the medical office located in  [Verbal consent obtained from patient] : the patient, [unfilled] [TextBox_4] : Ms. Sky is a 66 year-old with PMH bronchiectasis maintained on an airway regimen that includes:\par albuterol hypertonic saline, Aerobika TID\par Spiriva daily\par Symbicort twice daily\par Just completed a course of inhaled amikacin twice daily for 28 days.  Also recently completed a prednisone taper.  \par She has previously had a chest vest but did not tolerate it due to pain in the chest.\par \par She reports that she is unable to tolerate inhaled amikacin, similar to the tobramycin- she experienced shaking in her body and soreness of her mouth.  \par \par Last evaluated by televisit  on 12/1/21- accompanied by her son. He states that her breathing is stable.  Major issue is her weight.  She has lost an additional 5 lbs.  Taking one glucerna each day, trying to eat avocados, nuts, calorie dense foods, monitoring her glucose. \par Recently diagnosed with cirrhosis, seeing hepatology.  History of tevin with CBD stricture, which was dilated in [place] in 1998.\par Workup in progress. \par Using nocturnal ventilation every night.  States that it helps her.  \par She is using ivaps- RR 18, EPAP 5, PS 15-25, average TV is 443.  Average pressures have ranged 20/5. She subsequently had a televisit with our nutritionist, blanca, who suggested appetite stimulants- mirtazapine \par \par Also noted to have a "cirrhotic" appearing liver on imaging.  Saw Dr. 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  HVPG only 2mmHg- normal\par elevated ALP. Other LFTs normal  \par HCV Ab+, HCV RNA negative 4/2021; A1AT normal in past. \par She is wheezing, coughing up dark purulent sputum.  Has had fever 100-101 for the last few days.  Using albuterol/hypertonic saline twice daily.   Using noninvasive ventilation nocturnally . Having chills.  Also self medicated with amoxicillin-clavulanate and prednisone 40 daily. Daughter can hear her wheezing.  Using albuterol frequently.

## 2022-01-22 LAB
ALBUMIN SERPL ELPH-MCNC: 2.4 G/DL — LOW (ref 3.3–5)
ALP SERPL-CCNC: 139 U/L — HIGH (ref 40–120)
ALT FLD-CCNC: 33 U/L — SIGNIFICANT CHANGE UP (ref 4–33)
ANION GAP SERPL CALC-SCNC: 4 MMOL/L — LOW (ref 7–14)
APTT BLD: 28.7 SEC — SIGNIFICANT CHANGE UP (ref 27–36.3)
AST SERPL-CCNC: 26 U/L — SIGNIFICANT CHANGE UP (ref 4–32)
BASOPHILS # BLD AUTO: 0 K/UL — SIGNIFICANT CHANGE UP (ref 0–0.2)
BASOPHILS NFR BLD AUTO: 0 % — SIGNIFICANT CHANGE UP (ref 0–2)
BILIRUB SERPL-MCNC: <0.2 MG/DL — SIGNIFICANT CHANGE UP (ref 0.2–1.2)
BUN SERPL-MCNC: 13 MG/DL — SIGNIFICANT CHANGE UP (ref 7–23)
CALCIUM SERPL-MCNC: 8.8 MG/DL — SIGNIFICANT CHANGE UP (ref 8.4–10.5)
CHLORIDE SERPL-SCNC: 105 MMOL/L — SIGNIFICANT CHANGE UP (ref 98–107)
CHOLEST SERPL-MCNC: 128 MG/DL — SIGNIFICANT CHANGE UP
CO2 SERPL-SCNC: 32 MMOL/L — HIGH (ref 22–31)
CREAT SERPL-MCNC: 0.37 MG/DL — LOW (ref 0.5–1.3)
CULTURE RESULTS: NO GROWTH — SIGNIFICANT CHANGE UP
D DIMER BLD IA.RAPID-MCNC: 213 NG/ML DDU — SIGNIFICANT CHANGE UP
EOSINOPHIL # BLD AUTO: 0 K/UL — SIGNIFICANT CHANGE UP (ref 0–0.5)
EOSINOPHIL NFR BLD AUTO: 0 % — SIGNIFICANT CHANGE UP (ref 0–6)
GIANT PLATELETS BLD QL SMEAR: PRESENT — SIGNIFICANT CHANGE UP
GLUCOSE BLDC GLUCOMTR-MCNC: 103 MG/DL — HIGH (ref 70–99)
GLUCOSE BLDC GLUCOMTR-MCNC: 126 MG/DL — HIGH (ref 70–99)
GLUCOSE BLDC GLUCOMTR-MCNC: 128 MG/DL — HIGH (ref 70–99)
GLUCOSE BLDC GLUCOMTR-MCNC: 236 MG/DL — HIGH (ref 70–99)
GLUCOSE BLDC GLUCOMTR-MCNC: 52 MG/DL — CRITICAL LOW (ref 70–99)
GLUCOSE BLDC GLUCOMTR-MCNC: 52 MG/DL — CRITICAL LOW (ref 70–99)
GLUCOSE BLDC GLUCOMTR-MCNC: 85 MG/DL — SIGNIFICANT CHANGE UP (ref 70–99)
GLUCOSE BLDC GLUCOMTR-MCNC: 90 MG/DL — SIGNIFICANT CHANGE UP (ref 70–99)
GLUCOSE SERPL-MCNC: 56 MG/DL — LOW (ref 70–99)
HCT VFR BLD CALC: 35.6 % — SIGNIFICANT CHANGE UP (ref 34.5–45)
HDLC SERPL-MCNC: 57 MG/DL — SIGNIFICANT CHANGE UP
HGB BLD-MCNC: 11.6 G/DL — SIGNIFICANT CHANGE UP (ref 11.5–15.5)
IANC: 3.85 K/UL — SIGNIFICANT CHANGE UP (ref 1.5–8.5)
INR BLD: 0.93 RATIO — SIGNIFICANT CHANGE UP (ref 0.88–1.16)
LEGIONELLA AG UR QL: NEGATIVE — SIGNIFICANT CHANGE UP
LIPID PNL WITH DIRECT LDL SERPL: 61 MG/DL — SIGNIFICANT CHANGE UP
LYMPHOCYTES # BLD AUTO: 1.39 K/UL — SIGNIFICANT CHANGE UP (ref 1–3.3)
LYMPHOCYTES # BLD AUTO: 20.4 % — SIGNIFICANT CHANGE UP (ref 13–44)
MAGNESIUM SERPL-MCNC: 1.7 MG/DL — SIGNIFICANT CHANGE UP (ref 1.6–2.6)
MANUAL SMEAR VERIFICATION: SIGNIFICANT CHANGE UP
MCHC RBC-ENTMCNC: 28.6 PG — SIGNIFICANT CHANGE UP (ref 27–34)
MCHC RBC-ENTMCNC: 32.6 GM/DL — SIGNIFICANT CHANGE UP (ref 32–36)
MCV RBC AUTO: 87.7 FL — SIGNIFICANT CHANGE UP (ref 80–100)
MONOCYTES # BLD AUTO: 0.36 K/UL — SIGNIFICANT CHANGE UP (ref 0–0.9)
MONOCYTES NFR BLD AUTO: 5.3 % — SIGNIFICANT CHANGE UP (ref 2–14)
NEUTROPHILS # BLD AUTO: 4.77 K/UL — SIGNIFICANT CHANGE UP (ref 1.8–7.4)
NEUTROPHILS NFR BLD AUTO: 69.9 % — SIGNIFICANT CHANGE UP (ref 43–77)
NON HDL CHOLESTEROL: 71 MG/DL — SIGNIFICANT CHANGE UP
PHOSPHATE SERPL-MCNC: 2.9 MG/DL — SIGNIFICANT CHANGE UP (ref 2.5–4.5)
PLAT MORPH BLD: NORMAL — SIGNIFICANT CHANGE UP
PLATELET # BLD AUTO: 134 K/UL — LOW (ref 150–400)
PLATELET COUNT - ESTIMATE: ABNORMAL
POLYCHROMASIA BLD QL SMEAR: SLIGHT — SIGNIFICANT CHANGE UP
POTASSIUM SERPL-MCNC: 3.7 MMOL/L — SIGNIFICANT CHANGE UP (ref 3.5–5.3)
POTASSIUM SERPL-SCNC: 3.7 MMOL/L — SIGNIFICANT CHANGE UP (ref 3.5–5.3)
PROT SERPL-MCNC: 5.4 G/DL — LOW (ref 6–8.3)
PROTHROM AB SERPL-ACNC: 10.7 SEC — SIGNIFICANT CHANGE UP (ref 10.6–13.6)
RBC # BLD: 4.06 M/UL — SIGNIFICANT CHANGE UP (ref 3.8–5.2)
RBC # FLD: 13.2 % — SIGNIFICANT CHANGE UP (ref 10.3–14.5)
RBC BLD AUTO: NORMAL — SIGNIFICANT CHANGE UP
SMUDGE CELLS # BLD: PRESENT — SIGNIFICANT CHANGE UP
SODIUM SERPL-SCNC: 141 MMOL/L — SIGNIFICANT CHANGE UP (ref 135–145)
SPECIMEN SOURCE: SIGNIFICANT CHANGE UP
TRIGL SERPL-MCNC: 50 MG/DL — SIGNIFICANT CHANGE UP
VARIANT LYMPHS # BLD: 4.4 % — SIGNIFICANT CHANGE UP (ref 0–6)
WBC # BLD: 6.83 K/UL — SIGNIFICANT CHANGE UP (ref 3.8–10.5)
WBC # FLD AUTO: 6.83 K/UL — SIGNIFICANT CHANGE UP (ref 3.8–10.5)

## 2022-01-22 PROCEDURE — 99233 SBSQ HOSP IP/OBS HIGH 50: CPT

## 2022-01-22 PROCEDURE — 99233 SBSQ HOSP IP/OBS HIGH 50: CPT | Mod: GC

## 2022-01-22 RX ORDER — INSULIN LISPRO 100/ML
3 VIAL (ML) SUBCUTANEOUS ONCE
Refills: 0 | Status: COMPLETED | OUTPATIENT
Start: 2022-01-22 | End: 2022-01-22

## 2022-01-22 RX ADMIN — Medication 1 SPRAY(S): at 06:45

## 2022-01-22 RX ADMIN — REMDESIVIR 500 MILLIGRAM(S): 5 INJECTION INTRAVENOUS at 22:43

## 2022-01-22 RX ADMIN — Medication 50 MILLIGRAM(S): at 06:47

## 2022-01-22 RX ADMIN — MEROPENEM 100 MILLIGRAM(S): 1 INJECTION INTRAVENOUS at 06:45

## 2022-01-22 RX ADMIN — Medication 6 UNIT(S): at 13:17

## 2022-01-22 RX ADMIN — Medication 1 TABLET(S): at 06:46

## 2022-01-22 RX ADMIN — PANTOPRAZOLE SODIUM 40 MILLIGRAM(S): 20 TABLET, DELAYED RELEASE ORAL at 06:46

## 2022-01-22 RX ADMIN — Medication 1 SPRAY(S): at 17:49

## 2022-01-22 RX ADMIN — LOSARTAN POTASSIUM 50 MILLIGRAM(S): 100 TABLET, FILM COATED ORAL at 06:46

## 2022-01-22 RX ADMIN — FAMOTIDINE 40 MILLIGRAM(S): 10 INJECTION INTRAVENOUS at 21:37

## 2022-01-22 RX ADMIN — KETOTIFEN FUMARATE 1 DROP(S): 0.34 SOLUTION OPHTHALMIC at 06:46

## 2022-01-22 RX ADMIN — Medication 6 MILLIGRAM(S): at 06:46

## 2022-01-22 RX ADMIN — Medication 4: at 13:17

## 2022-01-22 RX ADMIN — TIOTROPIUM BROMIDE 1 CAPSULE(S): 18 CAPSULE ORAL; RESPIRATORY (INHALATION) at 09:28

## 2022-01-22 RX ADMIN — ENOXAPARIN SODIUM 30 MILLIGRAM(S): 100 INJECTION SUBCUTANEOUS at 11:31

## 2022-01-22 RX ADMIN — MEROPENEM 100 MILLIGRAM(S): 1 INJECTION INTRAVENOUS at 21:36

## 2022-01-22 RX ADMIN — BUDESONIDE AND FORMOTEROL FUMARATE DIHYDRATE 2 PUFF(S): 160; 4.5 AEROSOL RESPIRATORY (INHALATION) at 09:29

## 2022-01-22 RX ADMIN — KETOTIFEN FUMARATE 1 DROP(S): 0.34 SOLUTION OPHTHALMIC at 17:49

## 2022-01-22 RX ADMIN — MEROPENEM 100 MILLIGRAM(S): 1 INJECTION INTRAVENOUS at 13:16

## 2022-01-22 RX ADMIN — MIRTAZAPINE 7.5 MILLIGRAM(S): 45 TABLET, ORALLY DISINTEGRATING ORAL at 21:39

## 2022-01-22 RX ADMIN — Medication 3 UNIT(S): at 10:20

## 2022-01-22 RX ADMIN — Medication 5 MILLIGRAM(S): at 06:46

## 2022-01-22 RX ADMIN — MONTELUKAST 10 MILLIGRAM(S): 4 TABLET, CHEWABLE ORAL at 11:31

## 2022-01-22 RX ADMIN — INSULIN GLARGINE 14 UNIT(S): 100 INJECTION, SOLUTION SUBCUTANEOUS at 21:36

## 2022-01-22 RX ADMIN — Medication 325 MILLIGRAM(S): at 11:31

## 2022-01-22 RX ADMIN — Medication 5 MILLIGRAM(S): at 17:49

## 2022-01-22 RX ADMIN — AZITHROMYCIN 255 MILLIGRAM(S): 500 TABLET, FILM COATED ORAL at 14:54

## 2022-01-22 RX ADMIN — Medication 6 UNIT(S): at 17:50

## 2022-01-22 RX ADMIN — BUDESONIDE AND FORMOTEROL FUMARATE DIHYDRATE 2 PUFF(S): 160; 4.5 AEROSOL RESPIRATORY (INHALATION) at 21:35

## 2022-01-22 RX ADMIN — Medication 1 TABLET(S): at 17:49

## 2022-01-22 RX ADMIN — SENNA PLUS 2 TABLET(S): 8.6 TABLET ORAL at 21:39

## 2022-01-22 NOTE — PROGRESS NOTE ADULT - PROBLEM SELECTOR PLAN 1
EKG: Sinus tach @ 111, T inv AVL  RVP: COVID Positive           WBC: 12.57            Alk phos: 233  AST/ALT: 59/63          hsTrop: 17               UA neg  1/21 CXR - no radiographic evidence of pneumonia  - Tmax: 100.2  - isolation precautions, trend inflammatory markers  - on supplemental oxygen, monitor pulse ox  - started Remdesivir and Dexamethasone iv 1/21  - cont avaps at night

## 2022-01-22 NOTE — PROGRESS NOTE ADULT - PROBLEM SELECTOR PLAN 4
Glucose: 121          HgbA1-c: 9.4  pt was hypoglycemic this morning , will continue to monitor closely   -pt om lantus 14 un qhs   - monitor fs, moderate insulin ssc  - cont pre-meal and basal insulin

## 2022-01-22 NOTE — PROGRESS NOTE ADULT - SUBJECTIVE AND OBJECTIVE BOX
CHIEF COMPLAINT: SOB    Interval Events: Pt found to be hypolycemic this morning to 56    REVIEW OF SYSTEMS:  Constitutional: [ ] negative [ ] fevers [ ] chills [ ] weight loss [ ] weight gain  HEENT: [ ] negative [ ] dry eyes [ ] eye irritation [ ] postnasal drip [ ] nasal congestion  CV: [ ] negative  [ ] chest pain [ ] orthopnea [ ] palpitations [ ] murmur  Resp: [ ] negative [ ] cough [ ] shortness of breath [ ] dyspnea [ ] wheezing [ ] sputum [ ] hemoptysis  GI: [ ] negative [ ] nausea [ ] vomiting [ ] diarrhea [ ] constipation [ ] abd pain [ ] dysphagia   : [ ] negative [ ] dysuria [ ] nocturia [ ] hematuria [ ] increased urinary frequency  Musculoskeletal: [ ] negative [ ] back pain [ ] myalgias [ ] arthralgias [ ] fracture  Skin: [ ] negative [ ] rash [ ] itch  Neurological: [ ] negative [ ] headache [ ] dizziness [ ] syncope [ ] weakness [ ] numbness  Psychiatric: [ ] negative [ ] anxiety [ ] depression  Endocrine: [ ] negative [ ] diabetes [ ] thyroid problem  Hematologic/Lymphatic: [ ] negative [ ] anemia [ ] bleeding problem  Allergic/Immunologic: [ ] negative [ ] itchy eyes [ ] nasal discharge [ ] hives [ ] angioedema  [ ] All other systems negative  [ ] Unable to assess ROS because ________    OBJECTIVE:  ICU Vital Signs Last 24 Hrs  T(C): 36.6 (2022 06:42), Max: 37 (2022 18:03)  T(F): 97.9 (2022 06:42), Max: 98.6 (2022 18:03)  HR: 73 (2022 06:50) (73 - 82)  BP: 148/70 (2022 06:42) (118/58 - 158/87)  BP(mean): --  ABP: --  ABP(mean): --  RR: 19 (2022 06:42) (19 - 22)  SpO2: 99% (2022 06:50) (96% - 100%)    Mode: standby     @ 07:01  -   @ 07:00  --------------------------------------------------------  IN: 750 mL / OUT: 0 mL / NET: 750 mL      CAPILLARY BLOOD GLUCOSE      POCT Blood Glucose.: 236 mg/dL (2022 12:50)      PHYSICAL EXAM:  General:   HEENT:   Lymph Nodes:  Neck:   Respiratory:   Cardiovascular:   Abdomen:   Extremities:   Skin:   Neurological:  Psychiatry:    HOSPITAL MEDICATIONS:  MEDICATIONS  (STANDING):  azithromycin  IVPB 500 milliGRAM(s) IV Intermittent every 24 hours  bisacodyl 5 milliGRAM(s) Oral every 12 hours  budesonide 160 MICROgram(s)/formoterol 4.5 MICROgram(s) Inhaler 2 Puff(s) Inhalation two times a day  calcium carbonate 1250 mG  + Vitamin D (OsCal 500 + D) 1 Tablet(s) Oral two times a day  dexAMETHasone  Injectable 6 milliGRAM(s) IV Push daily  dextrose 40% Gel 15 Gram(s) Oral once  dextrose 5%. 1000 milliLiter(s) (50 mL/Hr) IV Continuous <Continuous>  dextrose 5%. 1000 milliLiter(s) (100 mL/Hr) IV Continuous <Continuous>  dextrose 50% Injectable 25 Gram(s) IV Push once  dextrose 50% Injectable 12.5 Gram(s) IV Push once  dextrose 50% Injectable 25 Gram(s) IV Push once  enoxaparin Injectable 30 milliGRAM(s) SubCutaneous daily  famotidine    Tablet 40 milliGRAM(s) Oral at bedtime  ferrous    sulfate 325 milliGRAM(s) Oral daily  fluticasone propionate 50 MICROgram(s)/spray Nasal Spray 1 Spray(s) Both Nostrils two times a day  glucagon  Injectable 1 milliGRAM(s) IntraMuscular once  insulin glargine Injectable (LANTUS) 14 Unit(s) SubCutaneous at bedtime  insulin lispro (ADMELOG) corrective regimen sliding scale   SubCutaneous three times a day before meals  insulin lispro (ADMELOG) corrective regimen sliding scale   SubCutaneous at bedtime  insulin lispro Injectable (ADMELOG) 6 Unit(s) SubCutaneous three times a day before meals  ketotifen 0.025% Ophthalmic Solution 1 Drop(s) Both EYES two times a day  losartan 50 milliGRAM(s) Oral daily  meropenem  IVPB 1000 milliGRAM(s) IV Intermittent every 8 hours  metoprolol succinate ER 50 milliGRAM(s) Oral daily  mirtazapine 7.5 milliGRAM(s) Oral at bedtime  montelukast 10 milliGRAM(s) Oral daily  pantoprazole    Tablet 40 milliGRAM(s) Oral before breakfast  remdesivir  IVPB 100 milliGRAM(s) IV Intermittent every 24 hours  remdesivir  IVPB   IV Intermittent   senna 2 Tablet(s) Oral at bedtime  tiotropium 18 MICROgram(s) Capsule 1 Capsule(s) Inhalation daily    MEDICATIONS  (PRN):  acetaminophen     Tablet .. 650 milliGRAM(s) Oral every 6 hours PRN Temp greater or equal to 38C (100.4F), Mild Pain (1 - 3), Moderate Pain (4 - 6)  ALBUTerol    90 MICROgram(s) HFA Inhaler 2 Puff(s) Inhalation every 6 hours PRN Shortness of Breath and/or Wheezing      LABS:                        11.6   6.83  )-----------( 134      ( 2022 07:16 )             35.6     Hgb Trend: 11.6<--, 15.4<--      141  |  105  |  13  ----------------------------<  56<L>  3.7   |  32<H>  |  0.37<L>    Ca    8.8      2022 07:16  Phos  2.9       Mg     1.70         TPro  5.4<L>  /  Alb  2.4<L>  /  TBili  <0.2  /  DBili  x   /  AST  26  /  ALT  33  /  AlkPhos  139<H>      Creatinine Trend: 0.37<--, 0.37<--  PT/INR - ( 2022 07:16 )   PT: 10.7 sec;   INR: 0.93 ratio         PTT - ( 2022 07:16 )  PTT:28.7 sec  Urinalysis Basic - ( 2022 14:26 )    Color: Light Yellow / Appearance: Clear / S.012 / pH: x  Gluc: x / Ketone: Negative  / Bili: Negative / Urobili: <2 mg/dL   Blood: x / Protein: Trace / Nitrite: Negative   Leuk Esterase: Negative / RBC: 1 /HPF / WBC 1 /HPF   Sq Epi: x / Non Sq Epi: 0 /HPF / Bacteria: Negative        Venous Blood Gas:   @ 14:26  7.31/74/21/37/27.9  VBG Lactate: 1.9      MICROBIOLOGY:     Culture - Sputum, Cystic Fibrosis (collected 2022 16:52)  Source: .Sputum  Gram Stain (2022 22:05):    Numerous polymorphonuclear leukocytes per low power field    No squamous epithelial cells per low power field    Numerous Gram Negative Rods per oil power field    Culture - Urine (collected 2022 14:18)  Source: Clean Catch Clean Catch (Midstream)  Final Report (2022 12:37):    No growth        RADIOLOGY:  [ ] Reviewed and interpreted by me    PULMONARY FUNCTION TESTS:    EKG: CHIEF COMPLAINT: SOB    Interval Events: Pt found to be hypoglycemic this morning to 56. She reports feeling well and improved SOB    REVIEW OF SYSTEMS:  Constitutional: [ ] negative [ ] fevers [ ] chills [ ] weight loss [ ] weight gain  HEENT: [ ] negative [ ] dry eyes [ ] eye irritation [ ] postnasal drip [ ] nasal congestion  CV: [ ] negative  [ ] chest pain [ ] orthopnea [ ] palpitations [ ] murmur  Resp: [ ] negative [ ] cough [improved ] shortness of breath [ ] dyspnea [ ] wheezing [ ] sputum [ ] hemoptysis  GI: [ ] negative [ ] nausea [ ] vomiting [ ] diarrhea [ ] constipation [ ] abd pain [ ] dysphagia   : [ ] negative [ ] dysuria [ ] nocturia [ ] hematuria [ ] increased urinary frequency  Musculoskeletal: [ ] negative [ ] back pain [ ] myalgias [ ] arthralgias [ ] fracture  Skin: [ ] negative [ ] rash [ ] itch  Neurological: [ ] negative [ ] headache [ ] dizziness [ ] syncope [ ] weakness [ ] numbness  Psychiatric: [ ] negative [ ] anxiety [ ] depression  Endocrine: [ ] negative [ ] diabetes [ ] thyroid problem  Hematologic/Lymphatic: [ ] negative [ ] anemia [ ] bleeding problem  Allergic/Immunologic: [ ] negative [ ] itchy eyes [ ] nasal discharge [ ] hives [ ] angioedema  [x ] All other systems negative  [ ] Unable to assess ROS because ________    OBJECTIVE:  ICU Vital Signs Last 24 Hrs  T(C): 36.6 (2022 06:42), Max: 37 (2022 18:03)  T(F): 97.9 (2022 06:42), Max: 98.6 (2022 18:03)  HR: 73 (2022 06:50) (73 - 82)  BP: 148/70 (2022 06:42) (118/58 - 158/87)  BP(mean): --  ABP: --  ABP(mean): --  RR: 19 (2022 06:42) (19 - 22)  SpO2: 99% (2022 06:50) (96% - 100%)    Mode: standby     @ 07:01  -  - @ 07:00  --------------------------------------------------------  IN: 750 mL / OUT: 0 mL / NET: 750 mL      CAPILLARY BLOOD GLUCOSE      POCT Blood Glucose.: 236 mg/dL (2022 12:50)      PHYSICAL EXAM:  General: Thin female laying in bed in no acute distress  HEENT: Nasal canula in place  Respiratory: Basilar crackles  Cardiovascular: Regular rate and rhythm  Abdomen: Nontender nondistended  Extremities: No peripheral edema  Skin: No rashes noted  Neurological: Alert and oriented  Psychiatry: Appropriate mood and affect    HOSPITAL MEDICATIONS:  MEDICATIONS  (STANDING):  azithromycin  IVPB 500 milliGRAM(s) IV Intermittent every 24 hours  bisacodyl 5 milliGRAM(s) Oral every 12 hours  budesonide 160 MICROgram(s)/formoterol 4.5 MICROgram(s) Inhaler 2 Puff(s) Inhalation two times a day  calcium carbonate 1250 mG  + Vitamin D (OsCal 500 + D) 1 Tablet(s) Oral two times a day  dexAMETHasone  Injectable 6 milliGRAM(s) IV Push daily  dextrose 40% Gel 15 Gram(s) Oral once  dextrose 5%. 1000 milliLiter(s) (50 mL/Hr) IV Continuous <Continuous>  dextrose 5%. 1000 milliLiter(s) (100 mL/Hr) IV Continuous <Continuous>  dextrose 50% Injectable 25 Gram(s) IV Push once  dextrose 50% Injectable 12.5 Gram(s) IV Push once  dextrose 50% Injectable 25 Gram(s) IV Push once  enoxaparin Injectable 30 milliGRAM(s) SubCutaneous daily  famotidine    Tablet 40 milliGRAM(s) Oral at bedtime  ferrous    sulfate 325 milliGRAM(s) Oral daily  fluticasone propionate 50 MICROgram(s)/spray Nasal Spray 1 Spray(s) Both Nostrils two times a day  glucagon  Injectable 1 milliGRAM(s) IntraMuscular once  insulin glargine Injectable (LANTUS) 14 Unit(s) SubCutaneous at bedtime  insulin lispro (ADMELOG) corrective regimen sliding scale   SubCutaneous three times a day before meals  insulin lispro (ADMELOG) corrective regimen sliding scale   SubCutaneous at bedtime  insulin lispro Injectable (ADMELOG) 6 Unit(s) SubCutaneous three times a day before meals  ketotifen 0.025% Ophthalmic Solution 1 Drop(s) Both EYES two times a day  losartan 50 milliGRAM(s) Oral daily  meropenem  IVPB 1000 milliGRAM(s) IV Intermittent every 8 hours  metoprolol succinate ER 50 milliGRAM(s) Oral daily  mirtazapine 7.5 milliGRAM(s) Oral at bedtime  montelukast 10 milliGRAM(s) Oral daily  pantoprazole    Tablet 40 milliGRAM(s) Oral before breakfast  remdesivir  IVPB 100 milliGRAM(s) IV Intermittent every 24 hours  remdesivir  IVPB   IV Intermittent   senna 2 Tablet(s) Oral at bedtime  tiotropium 18 MICROgram(s) Capsule 1 Capsule(s) Inhalation daily    MEDICATIONS  (PRN):  acetaminophen     Tablet .. 650 milliGRAM(s) Oral every 6 hours PRN Temp greater or equal to 38C (100.4F), Mild Pain (1 - 3), Moderate Pain (4 - 6)  ALBUTerol    90 MICROgram(s) HFA Inhaler 2 Puff(s) Inhalation every 6 hours PRN Shortness of Breath and/or Wheezing      LABS:                        11.6   6.83  )-----------( 134      ( 2022 07:16 )             35.6     Hgb Trend: 11.6<--, 15.4<--      141  |  105  |  13  ----------------------------<  56<L>  3.7   |  32<H>  |  0.37<L>    Ca    8.8      2022 07:16  Phos  2.9       Mg     1.70         TPro  5.4<L>  /  Alb  2.4<L>  /  TBili  <0.2  /  DBili  x   /  AST  26  /  ALT  33  /  AlkPhos  139<H>      Creatinine Trend: 0.37<--, 0.37<--  PT/INR - ( 2022 07:16 )   PT: 10.7 sec;   INR: 0.93 ratio         PTT - ( 2022 07:16 )  PTT:28.7 sec  Urinalysis Basic - ( 2022 14:26 )    Color: Light Yellow / Appearance: Clear / S.012 / pH: x  Gluc: x / Ketone: Negative  / Bili: Negative / Urobili: <2 mg/dL   Blood: x / Protein: Trace / Nitrite: Negative   Leuk Esterase: Negative / RBC: 1 /HPF / WBC 1 /HPF   Sq Epi: x / Non Sq Epi: 0 /HPF / Bacteria: Negative        Venous Blood Gas:   @ 14:26  7.31/74/21/37/27.9  VBG Lactate: 1.9      MICROBIOLOGY:     Culture - Sputum, Cystic Fibrosis (collected 2022 16:52)  Source: .Sputum  Gram Stain (2022 22:05):    Numerous polymorphonuclear leukocytes per low power field    No squamous epithelial cells per low power field    Numerous Gram Negative Rods per oil power field    Culture - Urine (collected 2022 14:18)  Source: Clean Catch Clean Catch (Midstream)  Final Report (2022 12:37):    No growth        RADIOLOGY:  [ ] Reviewed and interpreted by me    PULMONARY FUNCTION TESTS:    EKG: CHIEF COMPLAINT: SOB    Interval Events: Pt found to be hypoglycemic this morning to 56. She reports feeling well and improved SOB    REVIEW OF SYSTEMS:  Constitutional: [ ] negative [ ] fevers [ ] chills [ ] weight loss [ ] weight gain [x] no fevers or chills  HEENT: [ ] negative [ ] dry eyes [ ] eye irritation [ ] postnasal drip [ ] nasal congestion  CV: [ ] negative  [ ] chest pain [ ] orthopnea [ ] palpitations [ ] murmur  Resp: [ ] negative [x ] cough [improved ] shortness of breath [ ] dyspnea [ ] wheezing [ ] sputum [ ] hemoptysis  GI: [ ] negative [ ] nausea [ ] vomiting [ ] diarrhea [ ] constipation [ x] hemorrhoids[ ] dysphagia   : [ ] negative [ ] dysuria [ ] nocturia [ ] hematuria [ ] increased urinary frequency  Musculoskeletal: [ ] negative [ ] back pain [ ] myalgias [ ] arthralgias [ ] fracture  Skin: [ ] negative [ ] rash [ ] itch  Neurological: [ ] negative [ ] headache [ ] dizziness [ ] syncope [ ] weakness [ ] numbness  Psychiatric: [ ] negative [ ] anxiety [ ] depression  Endocrine: [ ] negative [ ] diabetes [ ] thyroid problem  Hematologic/Lymphatic: [ ] negative [ ] anemia [ ] bleeding problem  Allergic/Immunologic: [ ] negative [ ] itchy eyes [ ] nasal discharge [ ] hives [ ] angioedema  [x ] All other systems negative  [ ] Unable to assess ROS because ________    OBJECTIVE:  ICU Vital Signs Last 24 Hrs  T(C): 36.6 (2022 06:42), Max: 37 (2022 18:03)  T(F): 97.9 (2022 06:42), Max: 98.6 (2022 18:03)  HR: 73 (2022 06:50) (73 - 82)  BP: 148/70 (2022 06:42) (118/58 - 158/87)  BP(mean): --  ABP: --  ABP(mean): --  RR: 19 (2022 06:42) (19 - 22)  SpO2: 99% (2022 06:50) (96% - 100%)    Mode: standby     @ 07:01  -  01-22 @ 07:00  --------------------------------------------------------  IN: 750 mL / OUT: 0 mL / NET: 750 mL      CAPILLARY BLOOD GLUCOSE      POCT Blood Glucose.: 236 mg/dL (2022 12:50)      PHYSICAL EXAM:  General: Thin female laying in bed in no acute distress  HEENT: Nasal canula in place  Respiratory: Basilar crackles  Cardiovascular: Regular rate and rhythm  Abdomen: Nontender nondistended  Extremities: No peripheral edema  Skin: No rashes noted  Neurological: Alert and oriented  Psychiatry: Appropriate mood and affect    HOSPITAL MEDICATIONS:  MEDICATIONS  (STANDING):  azithromycin  IVPB 500 milliGRAM(s) IV Intermittent every 24 hours  bisacodyl 5 milliGRAM(s) Oral every 12 hours  budesonide 160 MICROgram(s)/formoterol 4.5 MICROgram(s) Inhaler 2 Puff(s) Inhalation two times a day  calcium carbonate 1250 mG  + Vitamin D (OsCal 500 + D) 1 Tablet(s) Oral two times a day  dexAMETHasone  Injectable 6 milliGRAM(s) IV Push daily  dextrose 40% Gel 15 Gram(s) Oral once  dextrose 5%. 1000 milliLiter(s) (50 mL/Hr) IV Continuous <Continuous>  dextrose 5%. 1000 milliLiter(s) (100 mL/Hr) IV Continuous <Continuous>  dextrose 50% Injectable 25 Gram(s) IV Push once  dextrose 50% Injectable 12.5 Gram(s) IV Push once  dextrose 50% Injectable 25 Gram(s) IV Push once  enoxaparin Injectable 30 milliGRAM(s) SubCutaneous daily  famotidine    Tablet 40 milliGRAM(s) Oral at bedtime  ferrous    sulfate 325 milliGRAM(s) Oral daily  fluticasone propionate 50 MICROgram(s)/spray Nasal Spray 1 Spray(s) Both Nostrils two times a day  glucagon  Injectable 1 milliGRAM(s) IntraMuscular once  insulin glargine Injectable (LANTUS) 14 Unit(s) SubCutaneous at bedtime  insulin lispro (ADMELOG) corrective regimen sliding scale   SubCutaneous three times a day before meals  insulin lispro (ADMELOG) corrective regimen sliding scale   SubCutaneous at bedtime  insulin lispro Injectable (ADMELOG) 6 Unit(s) SubCutaneous three times a day before meals  ketotifen 0.025% Ophthalmic Solution 1 Drop(s) Both EYES two times a day  losartan 50 milliGRAM(s) Oral daily  meropenem  IVPB 1000 milliGRAM(s) IV Intermittent every 8 hours  metoprolol succinate ER 50 milliGRAM(s) Oral daily  mirtazapine 7.5 milliGRAM(s) Oral at bedtime  montelukast 10 milliGRAM(s) Oral daily  pantoprazole    Tablet 40 milliGRAM(s) Oral before breakfast  remdesivir  IVPB 100 milliGRAM(s) IV Intermittent every 24 hours  remdesivir  IVPB   IV Intermittent   senna 2 Tablet(s) Oral at bedtime  tiotropium 18 MICROgram(s) Capsule 1 Capsule(s) Inhalation daily    MEDICATIONS  (PRN):  acetaminophen     Tablet .. 650 milliGRAM(s) Oral every 6 hours PRN Temp greater or equal to 38C (100.4F), Mild Pain (1 - 3), Moderate Pain (4 - 6)  ALBUTerol    90 MICROgram(s) HFA Inhaler 2 Puff(s) Inhalation every 6 hours PRN Shortness of Breath and/or Wheezing      LABS:                        11.6   6.83  )-----------( 134      ( 2022 07:16 )             35.6     Hgb Trend: 11.6<--, 15.4<--      141  |  105  |  13  ----------------------------<  56<L>  3.7   |  32<H>  |  0.37<L>    Ca    8.8      2022 07:16  Phos  2.9       Mg     1.70         TPro  5.4<L>  /  Alb  2.4<L>  /  TBili  <0.2  /  DBili  x   /  AST  26  /  ALT  33  /  AlkPhos  139<H>      Creatinine Trend: 0.37<--, 0.37<--  PT/INR - ( 2022 07:16 )   PT: 10.7 sec;   INR: 0.93 ratio         PTT - ( 2022 07:16 )  PTT:28.7 sec  Urinalysis Basic - ( 2022 14:26 )    Color: Light Yellow / Appearance: Clear / S.012 / pH: x  Gluc: x / Ketone: Negative  / Bili: Negative / Urobili: <2 mg/dL   Blood: x / Protein: Trace / Nitrite: Negative   Leuk Esterase: Negative / RBC: 1 /HPF / WBC 1 /HPF   Sq Epi: x / Non Sq Epi: 0 /HPF / Bacteria: Negative        Venous Blood Gas:   @ 14:26  7.31/74/21/37/27.9  VBG Lactate: 1.9      MICROBIOLOGY:     Culture - Sputum, Cystic Fibrosis (collected 2022 16:52)  Source: .Sputum  Gram Stain (2022 22:05):    Numerous polymorphonuclear leukocytes per low power field    No squamous epithelial cells per low power field    Numerous Gram Negative Rods per oil power field    Culture - Urine (collected 2022 14:18)  Source: Clean Catch Clean Catch (Midstream)  Final Report (2022 12:37):    No growth        RADIOLOGY:  [ ] Reviewed and interpreted by me    PULMONARY FUNCTION TESTS:    EKG:

## 2022-01-22 NOTE — PROGRESS NOTE ADULT - ASSESSMENT
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 Young (Pulmonologist) for SOB and cough. Admitted to medicine for acute hypoxemia from COVID-19 with a superimposed bacterial pneumonia.

## 2022-01-22 NOTE — PROGRESS NOTE ADULT - SUBJECTIVE AND OBJECTIVE BOX
Patient is a 66y old  Female who presents with a chief complaint of SOB (2022 18:44)      SUBJECTIVE / OVERNIGHT EVENTS: patient seen and examined by bedside, pt denies headache, dizziness, SOB, CP, Palpitations , N/V/D, abdominal pain, pt noted to be coughing during exam         MEDICATIONS  (STANDING):  azithromycin  IVPB 500 milliGRAM(s) IV Intermittent every 24 hours  bisacodyl 5 milliGRAM(s) Oral every 12 hours  budesonide 160 MICROgram(s)/formoterol 4.5 MICROgram(s) Inhaler 2 Puff(s) Inhalation two times a day  calcium carbonate 1250 mG  + Vitamin D (OsCal 500 + D) 1 Tablet(s) Oral two times a day  dexAMETHasone  Injectable 6 milliGRAM(s) IV Push daily  dextrose 40% Gel 15 Gram(s) Oral once  dextrose 5%. 1000 milliLiter(s) (50 mL/Hr) IV Continuous <Continuous>  dextrose 5%. 1000 milliLiter(s) (100 mL/Hr) IV Continuous <Continuous>  dextrose 50% Injectable 25 Gram(s) IV Push once  dextrose 50% Injectable 12.5 Gram(s) IV Push once  dextrose 50% Injectable 25 Gram(s) IV Push once  enoxaparin Injectable 30 milliGRAM(s) SubCutaneous daily  famotidine    Tablet 40 milliGRAM(s) Oral at bedtime  ferrous    sulfate 325 milliGRAM(s) Oral daily  fluticasone propionate 50 MICROgram(s)/spray Nasal Spray 1 Spray(s) Both Nostrils two times a day  glucagon  Injectable 1 milliGRAM(s) IntraMuscular once  insulin glargine Injectable (LANTUS) 14 Unit(s) SubCutaneous at bedtime  insulin lispro (ADMELOG) corrective regimen sliding scale   SubCutaneous three times a day before meals  insulin lispro (ADMELOG) corrective regimen sliding scale   SubCutaneous at bedtime  insulin lispro Injectable (ADMELOG) 6 Unit(s) SubCutaneous three times a day before meals  ketotifen 0.025% Ophthalmic Solution 1 Drop(s) Both EYES two times a day  losartan 50 milliGRAM(s) Oral daily  meropenem  IVPB 1000 milliGRAM(s) IV Intermittent every 8 hours  metoprolol succinate ER 50 milliGRAM(s) Oral daily  mirtazapine 7.5 milliGRAM(s) Oral at bedtime  montelukast 10 milliGRAM(s) Oral daily  pantoprazole    Tablet 40 milliGRAM(s) Oral before breakfast  remdesivir  IVPB 100 milliGRAM(s) IV Intermittent every 24 hours  remdesivir  IVPB   IV Intermittent   senna 2 Tablet(s) Oral at bedtime  tiotropium 18 MICROgram(s) Capsule 1 Capsule(s) Inhalation daily    MEDICATIONS  (PRN):  acetaminophen     Tablet .. 650 milliGRAM(s) Oral every 6 hours PRN Temp greater or equal to 38C (100.4F), Mild Pain (1 - 3), Moderate Pain (4 - 6)  ALBUTerol    90 MICROgram(s) HFA Inhaler 2 Puff(s) Inhalation every 6 hours PRN Shortness of Breath and/or Wheezing      Vital Signs Last 24 Hrs  T(C): 36.6 (2022 06:42), Max: 37 (2022 18:03)  T(F): 97.9 (2022 06:42), Max: 98.6 (2022 18:03)  HR: 73 (2022 06:50) (73 - 82)  BP: 148/70 (2022 06:42) (118/58 - 158/87)  BP(mean): --  RR: 19 (2022 06:42) (19 - 22)  SpO2: 99% (2022 06:50) (96% - 100%)  CAPILLARY BLOOD GLUCOSE      POCT Blood Glucose.: 236 mg/dL (2022 12:50)  POCT Blood Glucose.: 126 mg/dL (2022 09:31)  POCT Blood Glucose.: 128 mg/dL (2022 09:31)  POCT Blood Glucose.: 85 mg/dL (2022 09:19)  POCT Blood Glucose.: 52 mg/dL (2022 09:00)  POCT Blood Glucose.: 52 mg/dL (2022 08:59)  POCT Blood Glucose.: 345 mg/dL (2022 22:47)  POCT Blood Glucose.: 214 mg/dL (2022 17:13)    I&O's Summary    2022 07:01  -  2022 07:00  --------------------------------------------------------  IN: 750 mL / OUT: 0 mL / NET: 750 mL        PHYSICAL EXAM:  GENERAL: NAD, frail   HEAD:  Atraumatic, Normocephalic  EYES: EOMI, PERRLA, conjunctiva and sclera clear  CHEST/LUNG: course BS bilaterally; No wheeze  HEART: Regular rate and rhythm; No murmurs, rubs, or gallops  ABDOMEN: Soft, Nontender, Nondistended; Bowel sounds present  EXTREMITIES:  2+ Peripheral Pulses, No clubbing, cyanosis, or edema  PSYCH: AAOx3  NEUROLOGY: non-focal  SKIN: No rashes or lesions    LABS:                        11.6   6.83  )-----------( 134      ( 2022 07:16 )             35.6         141  |  105  |  13  ----------------------------<  56<L>  3.7   |  32<H>  |  0.37<L>    Ca    8.8      2022 07:16  Phos  2.9       Mg     1.70         TPro  5.4<L>  /  Alb  2.4<L>  /  TBili  <0.2  /  DBili  x   /  AST  26  /  ALT  33  /  AlkPhos  139<H>      PT/INR - ( 2022 07:16 )   PT: 10.7 sec;   INR: 0.93 ratio         PTT - ( 2022 07:16 )  PTT:28.7 sec      Urinalysis Basic - ( 2022 14:26 )    Color: Light Yellow / Appearance: Clear / S.012 / pH: x  Gluc: x / Ketone: Negative  / Bili: Negative / Urobili: <2 mg/dL   Blood: x / Protein: Trace / Nitrite: Negative   Leuk Esterase: Negative / RBC: 1 /HPF / WBC 1 /HPF   Sq Epi: x / Non Sq Epi: 0 /HPF / Bacteria: Negative        RADIOLOGY & ADDITIONAL TESTS:    Imaging Personally Reviewed:    Consultant(s) Notes Reviewed:  Pulmonary     Care Discussed with Consultants/Other Providers:

## 2022-01-22 NOTE — PROGRESS NOTE ADULT - ASSESSMENT
Patient is a a 65 yo F w/ Bronchiectasis, chronic hypercapnic/hypoxemic respiratory failure (2 to 5L O2/IVAPS - RR 18, EPAP 5, PS 15-25, average TV is 443), Liver Cirrohsis with prior CBD stricture s/p dilation and HCV ab+ who presents with fevers, productive sputum and wheezing, found to be COVID positive.     #Acute on Chronic hypoxemic/hypercapnic respiratory failure   - Acute bronchiectasis flare 2/2 COVID infection.   - Self medicated w/ Augmentin and Prednisone 40mg daily for 3 days   - CXR 1/21 showed diffuse bilateral coarse reticular markings grossly unchanged from prior CXR.   - C/w Duonebs q6h and ATC/3% Saline  - C/w Aerobika for airway clearance  - C/w Decadron 6mg PO daily x7 days as patient already took Pred 40mg x 3 days to end 1/28  - C/w Remdesivir  - F/u sputum cultures  - C/w Meropenem and Azithromycin for now  - Check urine legionella  - c/w supplemental O2 to keep SO2 > 88%   - c/w NIPPV QHS, can use AVAPS - RR 18, EPAP 5, Min IP 15, Max IP 25,    Patient is a a 65 yo F w/ Bronchiectasis, chronic hypercapnic/hypoxemic respiratory failure (2 to 5L O2/IVAPS - RR 18, EPAP 5, PS 15-25, average TV is 443), Liver Cirrohsis with prior CBD stricture s/p dilation and HCV ab+ who presents with fevers, productive sputum and wheezing, found to be COVID positive.     #Acute on Chronic hypoxemic/hypercapnic respiratory failure   - Acute bronchiectasis flare 2/2 COVID infection.   - Self medicated w/ Augmentin and Prednisone 40mg daily for 3 days   - CXR 1/21 showed diffuse bilateral coarse reticular markings grossly unchanged from prior CXR.   - C/w Duonebs q6h and ATC/3% Saline  - Please make sure Aerobika is at bedside  - C/w Decadron 6mg PO daily x7 days as patient already took Pred 40mg x 3 days to end 1/28  - C/w Remdesivir  - F/u sputum cultures  - C/w Meropenem and Azithromycin   - Check urine legionella  - c/w supplemental O2 to keep SO2 > 88%, now on home 2L NC  - c/w NIPPV QHS, can use AVAPS - RR 18, EPAP 5, Min IP 15, Max IP 25,   - C/w Mirtazepine     Case discussed with Dr. Kushal Augustin PGY-5  Pulmonary/Critical Care Fellow  Pager: [phone] ([facility]), [phone] (NS)  Pulmonary Spectra #[phone] (NS) / [phone] ([facility])

## 2022-01-23 LAB
ALBUMIN SERPL ELPH-MCNC: 2.5 G/DL — LOW (ref 3.3–5)
ALP SERPL-CCNC: 144 U/L — HIGH (ref 40–120)
ALT FLD-CCNC: 33 U/L — SIGNIFICANT CHANGE UP (ref 4–33)
ANION GAP SERPL CALC-SCNC: 6 MMOL/L — LOW (ref 7–14)
AST SERPL-CCNC: 26 U/L — SIGNIFICANT CHANGE UP (ref 4–32)
BASOPHILS # BLD AUTO: 0.02 K/UL — SIGNIFICANT CHANGE UP (ref 0–0.2)
BASOPHILS NFR BLD AUTO: 0.3 % — SIGNIFICANT CHANGE UP (ref 0–2)
BILIRUB SERPL-MCNC: <0.2 MG/DL — SIGNIFICANT CHANGE UP (ref 0.2–1.2)
BUN SERPL-MCNC: 12 MG/DL — SIGNIFICANT CHANGE UP (ref 7–23)
CALCIUM SERPL-MCNC: 8.6 MG/DL — SIGNIFICANT CHANGE UP (ref 8.4–10.5)
CHLORIDE SERPL-SCNC: 103 MMOL/L — SIGNIFICANT CHANGE UP (ref 98–107)
CO2 SERPL-SCNC: 31 MMOL/L — SIGNIFICANT CHANGE UP (ref 22–31)
CREAT SERPL-MCNC: 0.43 MG/DL — LOW (ref 0.5–1.3)
EOSINOPHIL # BLD AUTO: 0.03 K/UL — SIGNIFICANT CHANGE UP (ref 0–0.5)
EOSINOPHIL NFR BLD AUTO: 0.5 % — SIGNIFICANT CHANGE UP (ref 0–6)
GLUCOSE BLDC GLUCOMTR-MCNC: 115 MG/DL — HIGH (ref 70–99)
GLUCOSE BLDC GLUCOMTR-MCNC: 245 MG/DL — HIGH (ref 70–99)
GLUCOSE BLDC GLUCOMTR-MCNC: 396 MG/DL — HIGH (ref 70–99)
GLUCOSE BLDC GLUCOMTR-MCNC: 401 MG/DL — HIGH (ref 70–99)
GLUCOSE BLDC GLUCOMTR-MCNC: 480 MG/DL — CRITICAL HIGH (ref 70–99)
GLUCOSE BLDC GLUCOMTR-MCNC: 491 MG/DL — CRITICAL HIGH (ref 70–99)
GLUCOSE SERPL-MCNC: 76 MG/DL — SIGNIFICANT CHANGE UP (ref 70–99)
HCT VFR BLD CALC: 38.7 % — SIGNIFICANT CHANGE UP (ref 34.5–45)
HGB BLD-MCNC: 12 G/DL — SIGNIFICANT CHANGE UP (ref 11.5–15.5)
IANC: 2.91 K/UL — SIGNIFICANT CHANGE UP (ref 1.5–8.5)
IMM GRANULOCYTES NFR BLD AUTO: 0.3 % — SIGNIFICANT CHANGE UP (ref 0–1.5)
LYMPHOCYTES # BLD AUTO: 2.62 K/UL — SIGNIFICANT CHANGE UP (ref 1–3.3)
LYMPHOCYTES # BLD AUTO: 41.9 % — SIGNIFICANT CHANGE UP (ref 13–44)
MAGNESIUM SERPL-MCNC: 1.8 MG/DL — SIGNIFICANT CHANGE UP (ref 1.6–2.6)
MCHC RBC-ENTMCNC: 28.4 PG — SIGNIFICANT CHANGE UP (ref 27–34)
MCHC RBC-ENTMCNC: 31 GM/DL — LOW (ref 32–36)
MCV RBC AUTO: 91.5 FL — SIGNIFICANT CHANGE UP (ref 80–100)
MONOCYTES # BLD AUTO: 0.65 K/UL — SIGNIFICANT CHANGE UP (ref 0–0.9)
MONOCYTES NFR BLD AUTO: 10.4 % — SIGNIFICANT CHANGE UP (ref 2–14)
NEUTROPHILS # BLD AUTO: 2.91 K/UL — SIGNIFICANT CHANGE UP (ref 1.8–7.4)
NEUTROPHILS NFR BLD AUTO: 46.6 % — SIGNIFICANT CHANGE UP (ref 43–77)
NRBC # BLD: 0 /100 WBCS — SIGNIFICANT CHANGE UP
NRBC # FLD: 0 K/UL — SIGNIFICANT CHANGE UP
PHOSPHATE SERPL-MCNC: 2.7 MG/DL — SIGNIFICANT CHANGE UP (ref 2.5–4.5)
PLATELET # BLD AUTO: 136 K/UL — LOW (ref 150–400)
POTASSIUM SERPL-MCNC: 3.4 MMOL/L — LOW (ref 3.5–5.3)
POTASSIUM SERPL-SCNC: 3.4 MMOL/L — LOW (ref 3.5–5.3)
PROT SERPL-MCNC: 5.8 G/DL — LOW (ref 6–8.3)
RBC # BLD: 4.23 M/UL — SIGNIFICANT CHANGE UP (ref 3.8–5.2)
RBC # FLD: 13.2 % — SIGNIFICANT CHANGE UP (ref 10.3–14.5)
SODIUM SERPL-SCNC: 140 MMOL/L — SIGNIFICANT CHANGE UP (ref 135–145)
WBC # BLD: 6.25 K/UL — SIGNIFICANT CHANGE UP (ref 3.8–10.5)
WBC # FLD AUTO: 6.25 K/UL — SIGNIFICANT CHANGE UP (ref 3.8–10.5)

## 2022-01-23 PROCEDURE — 99233 SBSQ HOSP IP/OBS HIGH 50: CPT

## 2022-01-23 RX ORDER — POTASSIUM CHLORIDE 20 MEQ
40 PACKET (EA) ORAL ONCE
Refills: 0 | Status: COMPLETED | OUTPATIENT
Start: 2022-01-23 | End: 2022-01-24

## 2022-01-23 RX ORDER — SODIUM CHLORIDE 9 MG/ML
500 INJECTION INTRAMUSCULAR; INTRAVENOUS; SUBCUTANEOUS ONCE
Refills: 0 | Status: COMPLETED | OUTPATIENT
Start: 2022-01-23 | End: 2022-01-23

## 2022-01-23 RX ORDER — INSULIN GLARGINE 100 [IU]/ML
22 INJECTION, SOLUTION SUBCUTANEOUS AT BEDTIME
Refills: 0 | Status: DISCONTINUED | OUTPATIENT
Start: 2022-01-23 | End: 2022-01-24

## 2022-01-23 RX ORDER — INSULIN LISPRO 100/ML
8 VIAL (ML) SUBCUTANEOUS
Refills: 0 | Status: DISCONTINUED | OUTPATIENT
Start: 2022-01-23 | End: 2022-01-24

## 2022-01-23 RX ADMIN — KETOTIFEN FUMARATE 1 DROP(S): 0.34 SOLUTION OPHTHALMIC at 06:28

## 2022-01-23 RX ADMIN — INSULIN GLARGINE 22 UNIT(S): 100 INJECTION, SOLUTION SUBCUTANEOUS at 22:29

## 2022-01-23 RX ADMIN — FAMOTIDINE 40 MILLIGRAM(S): 10 INJECTION INTRAVENOUS at 21:08

## 2022-01-23 RX ADMIN — ENOXAPARIN SODIUM 30 MILLIGRAM(S): 100 INJECTION SUBCUTANEOUS at 13:28

## 2022-01-23 RX ADMIN — BUDESONIDE AND FORMOTEROL FUMARATE DIHYDRATE 2 PUFF(S): 160; 4.5 AEROSOL RESPIRATORY (INHALATION) at 21:08

## 2022-01-23 RX ADMIN — MIRTAZAPINE 7.5 MILLIGRAM(S): 45 TABLET, ORALLY DISINTEGRATING ORAL at 21:08

## 2022-01-23 RX ADMIN — Medication 600 MILLIGRAM(S): at 19:02

## 2022-01-23 RX ADMIN — MEROPENEM 100 MILLIGRAM(S): 1 INJECTION INTRAVENOUS at 16:04

## 2022-01-23 RX ADMIN — Medication 6 MILLIGRAM(S): at 06:24

## 2022-01-23 RX ADMIN — PANTOPRAZOLE SODIUM 40 MILLIGRAM(S): 20 TABLET, DELAYED RELEASE ORAL at 06:26

## 2022-01-23 RX ADMIN — MEROPENEM 100 MILLIGRAM(S): 1 INJECTION INTRAVENOUS at 06:57

## 2022-01-23 RX ADMIN — MEROPENEM 100 MILLIGRAM(S): 1 INJECTION INTRAVENOUS at 21:08

## 2022-01-23 RX ADMIN — KETOTIFEN FUMARATE 1 DROP(S): 0.34 SOLUTION OPHTHALMIC at 18:21

## 2022-01-23 RX ADMIN — MONTELUKAST 10 MILLIGRAM(S): 4 TABLET, CHEWABLE ORAL at 13:28

## 2022-01-23 RX ADMIN — SODIUM CHLORIDE 250 MILLILITER(S): 9 INJECTION INTRAMUSCULAR; INTRAVENOUS; SUBCUTANEOUS at 19:02

## 2022-01-23 RX ADMIN — Medication 5 MILLIGRAM(S): at 06:27

## 2022-01-23 RX ADMIN — Medication 6 UNIT(S): at 09:23

## 2022-01-23 RX ADMIN — Medication 1 SPRAY(S): at 18:21

## 2022-01-23 RX ADMIN — Medication 50 MILLIGRAM(S): at 06:25

## 2022-01-23 RX ADMIN — Medication 325 MILLIGRAM(S): at 13:28

## 2022-01-23 RX ADMIN — Medication 6 UNIT(S): at 13:27

## 2022-01-23 RX ADMIN — SENNA PLUS 2 TABLET(S): 8.6 TABLET ORAL at 21:08

## 2022-01-23 RX ADMIN — BUDESONIDE AND FORMOTEROL FUMARATE DIHYDRATE 2 PUFF(S): 160; 4.5 AEROSOL RESPIRATORY (INHALATION) at 09:23

## 2022-01-23 RX ADMIN — Medication 1 TABLET(S): at 06:25

## 2022-01-23 RX ADMIN — Medication 8 UNIT(S): at 18:16

## 2022-01-23 RX ADMIN — Medication 5 MILLIGRAM(S): at 18:20

## 2022-01-23 RX ADMIN — Medication 1 SPRAY(S): at 06:27

## 2022-01-23 RX ADMIN — REMDESIVIR 500 MILLIGRAM(S): 5 INJECTION INTRAVENOUS at 21:58

## 2022-01-23 RX ADMIN — Medication 0: at 22:28

## 2022-01-23 RX ADMIN — AZITHROMYCIN 255 MILLIGRAM(S): 500 TABLET, FILM COATED ORAL at 13:34

## 2022-01-23 RX ADMIN — LOSARTAN POTASSIUM 50 MILLIGRAM(S): 100 TABLET, FILM COATED ORAL at 06:25

## 2022-01-23 RX ADMIN — TIOTROPIUM BROMIDE 1 CAPSULE(S): 18 CAPSULE ORAL; RESPIRATORY (INHALATION) at 09:23

## 2022-01-23 RX ADMIN — Medication 12: at 18:16

## 2022-01-23 RX ADMIN — Medication 1 TABLET(S): at 18:21

## 2022-01-23 RX ADMIN — Medication 10: at 13:27

## 2022-01-23 NOTE — PROGRESS NOTE ADULT - PROBLEM SELECTOR PLAN 2
- on Meropenum and Zithromax iv  - bcx/ucx testing  - House Pulm  following - on Meropenum and Zithromax iv  - bcx/ucx NGTD, sputum cx with few gram neg rods , urine for legionella negative, can consider dc Azithromycin   - House Pulm  following

## 2022-01-23 NOTE — PROGRESS NOTE ADULT - PROBLEM SELECTOR PLAN 8
Lovenox 30mg sc daily  PT eval Lovenox 30mg sc daily  PT eval  case d/w pt and ACP  d/w daughter on 1/22

## 2022-01-23 NOTE — PROGRESS NOTE ADULT - SUBJECTIVE AND OBJECTIVE BOX
Patient is a 66y old  Female who presents with a chief complaint of SOB (2022 14:46)      SUBJECTIVE / OVERNIGHT EVENTS:    MEDICATIONS  (STANDING):  azithromycin  IVPB 500 milliGRAM(s) IV Intermittent every 24 hours  bisacodyl 5 milliGRAM(s) Oral every 12 hours  budesonide 160 MICROgram(s)/formoterol 4.5 MICROgram(s) Inhaler 2 Puff(s) Inhalation two times a day  calcium carbonate 1250 mG  + Vitamin D (OsCal 500 + D) 1 Tablet(s) Oral two times a day  dexAMETHasone  Injectable 6 milliGRAM(s) IV Push daily  dextrose 40% Gel 15 Gram(s) Oral once  dextrose 5%. 1000 milliLiter(s) (50 mL/Hr) IV Continuous <Continuous>  dextrose 5%. 1000 milliLiter(s) (100 mL/Hr) IV Continuous <Continuous>  dextrose 50% Injectable 25 Gram(s) IV Push once  dextrose 50% Injectable 12.5 Gram(s) IV Push once  dextrose 50% Injectable 25 Gram(s) IV Push once  enoxaparin Injectable 30 milliGRAM(s) SubCutaneous daily  famotidine    Tablet 40 milliGRAM(s) Oral at bedtime  ferrous    sulfate 325 milliGRAM(s) Oral daily  fluticasone propionate 50 MICROgram(s)/spray Nasal Spray 1 Spray(s) Both Nostrils two times a day  glucagon  Injectable 1 milliGRAM(s) IntraMuscular once  insulin glargine Injectable (LANTUS) 14 Unit(s) SubCutaneous at bedtime  insulin lispro (ADMELOG) corrective regimen sliding scale   SubCutaneous three times a day before meals  insulin lispro (ADMELOG) corrective regimen sliding scale   SubCutaneous at bedtime  insulin lispro Injectable (ADMELOG) 6 Unit(s) SubCutaneous three times a day before meals  ketotifen 0.025% Ophthalmic Solution 1 Drop(s) Both EYES two times a day  losartan 50 milliGRAM(s) Oral daily  meropenem  IVPB 1000 milliGRAM(s) IV Intermittent every 8 hours  metoprolol succinate ER 50 milliGRAM(s) Oral daily  mirtazapine 7.5 milliGRAM(s) Oral at bedtime  montelukast 10 milliGRAM(s) Oral daily  pantoprazole    Tablet 40 milliGRAM(s) Oral before breakfast  remdesivir  IVPB 100 milliGRAM(s) IV Intermittent every 24 hours  remdesivir  IVPB   IV Intermittent   senna 2 Tablet(s) Oral at bedtime  tiotropium 18 MICROgram(s) Capsule 1 Capsule(s) Inhalation daily    MEDICATIONS  (PRN):  acetaminophen     Tablet .. 650 milliGRAM(s) Oral every 6 hours PRN Temp greater or equal to 38C (100.4F), Mild Pain (1 - 3), Moderate Pain (4 - 6)  ALBUTerol    90 MICROgram(s) HFA Inhaler 2 Puff(s) Inhalation every 6 hours PRN Shortness of Breath and/or Wheezing      Vital Signs Last 24 Hrs  T(C): 36.8 (2022 06:02), Max: 37.1 (2022 15:20)  T(F): 98.2 (2022 06:02), Max: 98.7 (2022 15:20)  HR: 88 (2022 06:38) (60 - 90)  BP: 127/68 (2022 06:02) (113/67 - 127/68)  BP(mean): --  RR: 18 (2022 06:02) (18 - 20)  SpO2: 98% (2022 06:38) (97% - 100%)  CAPILLARY BLOOD GLUCOSE      POCT Blood Glucose.: 103 mg/dL (2022 21:34)  POCT Blood Glucose.: 90 mg/dL (2022 17:43)  POCT Blood Glucose.: 236 mg/dL (2022 12:50)  POCT Blood Glucose.: 126 mg/dL (2022 09:31)  POCT Blood Glucose.: 128 mg/dL (2022 09:31)  POCT Blood Glucose.: 85 mg/dL (2022 09:19)  POCT Blood Glucose.: 52 mg/dL (2022 09:00)  POCT Blood Glucose.: 52 mg/dL (2022 08:59)    I&O's Summary      PHYSICAL EXAM:  GENERAL: NAD, well-developed  HEAD:  Atraumatic, Normocephalic  EYES: EOMI, PERRLA, conjunctiva and sclera clear  NECK: Supple, No JVD  CHEST/LUNG: Clear to auscultation bilaterally; No wheeze  HEART: Regular rate and rhythm; No murmurs, rubs, or gallops  ABDOMEN: Soft, Nontender, Nondistended; Bowel sounds present  EXTREMITIES:  2+ Peripheral Pulses, No clubbing, cyanosis, or edema  PSYCH: AAOx3  NEUROLOGY: non-focal  SKIN: No rashes or lesions    LABS:                        11.6   6.83  )-----------( 134      ( 2022 07:16 )             35.6         141  |  105  |  13  ----------------------------<  56<L>  3.7   |  32<H>  |  0.37<L>    Ca    8.8      2022 07:16  Phos  2.9       Mg     1.70         TPro  5.4<L>  /  Alb  2.4<L>  /  TBili  <0.2  /  DBili  x   /  AST  26  /  ALT  33  /  AlkPhos  139<H>      PT/INR - ( 2022 07:16 )   PT: 10.7 sec;   INR: 0.93 ratio         PTT - ( 2022 07:16 )  PTT:28.7 sec      Urinalysis Basic - ( 2022 14:26 )    Color: Light Yellow / Appearance: Clear / S.012 / pH: x  Gluc: x / Ketone: Negative  / Bili: Negative / Urobili: <2 mg/dL   Blood: x / Protein: Trace / Nitrite: Negative   Leuk Esterase: Negative / RBC: 1 /HPF / WBC 1 /HPF   Sq Epi: x / Non Sq Epi: 0 /HPF / Bacteria: Negative        RADIOLOGY & ADDITIONAL TESTS:    Imaging Personally Reviewed:    Consultant(s) Notes Reviewed:      Care Discussed with Consultants/Other Providers:   Patient is a 66y old  Female who presents with a chief complaint of SOB (2022 14:46)      SUBJECTIVE / OVERNIGHT EVENTS: patient seen and examined by bedside, pt reports mild SOB and dry cough, unable to bring up phlegm, pt afebrile  pt denies headache, dizziness, , CP, Palpitations , N/V/D, abdominal pain      MEDICATIONS  (STANDING):  azithromycin  IVPB 500 milliGRAM(s) IV Intermittent every 24 hours  bisacodyl 5 milliGRAM(s) Oral every 12 hours  budesonide 160 MICROgram(s)/formoterol 4.5 MICROgram(s) Inhaler 2 Puff(s) Inhalation two times a day  calcium carbonate 1250 mG  + Vitamin D (OsCal 500 + D) 1 Tablet(s) Oral two times a day  dexAMETHasone  Injectable 6 milliGRAM(s) IV Push daily  dextrose 40% Gel 15 Gram(s) Oral once  dextrose 5%. 1000 milliLiter(s) (50 mL/Hr) IV Continuous <Continuous>  dextrose 5%. 1000 milliLiter(s) (100 mL/Hr) IV Continuous <Continuous>  dextrose 50% Injectable 25 Gram(s) IV Push once  dextrose 50% Injectable 12.5 Gram(s) IV Push once  dextrose 50% Injectable 25 Gram(s) IV Push once  enoxaparin Injectable 30 milliGRAM(s) SubCutaneous daily  famotidine    Tablet 40 milliGRAM(s) Oral at bedtime  ferrous    sulfate 325 milliGRAM(s) Oral daily  fluticasone propionate 50 MICROgram(s)/spray Nasal Spray 1 Spray(s) Both Nostrils two times a day  glucagon  Injectable 1 milliGRAM(s) IntraMuscular once  insulin glargine Injectable (LANTUS) 14 Unit(s) SubCutaneous at bedtime  insulin lispro (ADMELOG) corrective regimen sliding scale   SubCutaneous three times a day before meals  insulin lispro (ADMELOG) corrective regimen sliding scale   SubCutaneous at bedtime  insulin lispro Injectable (ADMELOG) 6 Unit(s) SubCutaneous three times a day before meals  ketotifen 0.025% Ophthalmic Solution 1 Drop(s) Both EYES two times a day  losartan 50 milliGRAM(s) Oral daily  meropenem  IVPB 1000 milliGRAM(s) IV Intermittent every 8 hours  metoprolol succinate ER 50 milliGRAM(s) Oral daily  mirtazapine 7.5 milliGRAM(s) Oral at bedtime  montelukast 10 milliGRAM(s) Oral daily  pantoprazole    Tablet 40 milliGRAM(s) Oral before breakfast  remdesivir  IVPB 100 milliGRAM(s) IV Intermittent every 24 hours  remdesivir  IVPB   IV Intermittent   senna 2 Tablet(s) Oral at bedtime  tiotropium 18 MICROgram(s) Capsule 1 Capsule(s) Inhalation daily    MEDICATIONS  (PRN):  acetaminophen     Tablet .. 650 milliGRAM(s) Oral every 6 hours PRN Temp greater or equal to 38C (100.4F), Mild Pain (1 - 3), Moderate Pain (4 - 6)  ALBUTerol    90 MICROgram(s) HFA Inhaler 2 Puff(s) Inhalation every 6 hours PRN Shortness of Breath and/or Wheezing      Vital Signs Last 24 Hrs  T(C): 36.8 (2022 06:02), Max: 37.1 (2022 15:20)  T(F): 98.2 (2022 06:02), Max: 98.7 (2022 15:20)  HR: 88 (2022 06:38) (60 - 90)  BP: 127/68 (2022 06:02) (113/67 - 127/68)  BP(mean): --  RR: 18 (2022 06:02) (18 - 20)  SpO2: 98% (2022 06:38) (97% - 100%)  CAPILLARY BLOOD GLUCOSE      POCT Blood Glucose.: 103 mg/dL (2022 21:34)  POCT Blood Glucose.: 90 mg/dL (2022 17:43)  POCT Blood Glucose.: 236 mg/dL (2022 12:50)  POCT Blood Glucose.: 126 mg/dL (2022 09:31)  POCT Blood Glucose.: 128 mg/dL (2022 09:31)  POCT Blood Glucose.: 85 mg/dL (2022 09:19)  POCT Blood Glucose.: 52 mg/dL (2022 09:00)  POCT Blood Glucose.: 52 mg/dL (2022 08:59)    I&O's Summary      PHYSICAL EXAM:  GENERAL: NAD, frail   HEAD:  Atraumatic, Normocephalic  EYES: EOMI, PERRLA, conjunctiva and sclera clear  CHEST/LUNG: course BS bilaterally; No wheeze  HEART: Regular rate and rhythm; No murmurs, rubs, or gallops  ABDOMEN: Soft, Nontender, Nondistended; Bowel sounds present  EXTREMITIES:  2+ Peripheral Pulses, No clubbing, cyanosis, or edema  PSYCH: AAOx3  NEUROLOGY: non-focal  SKIN: No rashes or lesion    LABS:                        11.6   6.83  )-----------( 134      ( 2022 07:16 )             35.6         141  |  105  |  13  ----------------------------<  56<L>  3.7   |  32<H>  |  0.37<L>    Ca    8.8      2022 07:16  Phos  2.9       Mg     1.70         TPro  5.4<L>  /  Alb  2.4<L>  /  TBili  <0.2  /  DBili  x   /  AST  26  /  ALT  33  /  AlkPhos  139<H>      PT/INR - ( 2022 07:16 )   PT: 10.7 sec;   INR: 0.93 ratio         PTT - ( 2022 07:16 )  PTT:28.7 sec      Urinalysis Basic - ( 2022 14:26 )    Color: Light Yellow / Appearance: Clear / S.012 / pH: x  Gluc: x / Ketone: Negative  / Bili: Negative / Urobili: <2 mg/dL   Blood: x / Protein: Trace / Nitrite: Negative   Leuk Esterase: Negative / RBC: 1 /HPF / WBC 1 /HPF   Sq Epi: x / Non Sq Epi: 0 /HPF / Bacteria: Negative        RADIOLOGY & ADDITIONAL TESTS:    Imaging Personally Reviewed:    Consultant(s) Notes Reviewed:      Care Discussed with Consultants/Other Providers:

## 2022-01-23 NOTE — PROGRESS NOTE ADULT - PROBLEM SELECTOR PLAN 4
Glucose: 121          HgbA1-c: 9.4  pt was hypoglycemic this morning , will continue to monitor closely   -pt om lantus 14 un qhs   - monitor fs, moderate insulin ssc  - cont pre-meal and basal insulin Glucose: 121          HgbA1-c: 9.4  pt was hypoglycemic yesterday , now FSBS elevated, likely secondary to steroids , , will continue to monitor closely   -pt om lantus 14 un qhs and Admelog 6 units AC , will increase admelog to 8 units, will have to monitor closely once Decadron is completed   - monitor fs, moderate insulin ssc  - cont pre-meal and basal insulin

## 2022-01-23 NOTE — PROGRESS NOTE ADULT - ASSESSMENT
Patient is a a 67 yo F w/ Bronchiectasis, chronic hypercapnic/hypoxemic respiratory failure (2 to 5L O2/IVAPS - RR 18, EPAP 5, PS 15-25, average TV is 443), Liver Cirrohsis with prior CBD stricture s/p dilation and HCV ab+ who presents with fevers, productive sputum and wheezing, found to be COVID positive.     #Acute on Chronic hypoxemic/hypercapnic respiratory failure   - Acute bronchiectasis flare 2/2 COVID infection.   - Self medicated w/ Augmentin and Prednisone 40mg daily for 3 days   - CXR 1/21 showed diffuse bilateral coarse reticular markings grossly unchanged from prior CXR.   - C/w Duonebs q6h and ATC/3% Saline  - Please make sure Aerobika is at bedside  - C/w Decadron 6mg PO daily x7 days as patient already took Pred 40mg x 3 days to end 1/28  - C/w Remdesivir  - F/u sputum cultures  - C/w Meropenem and Azithromycin   - Check urine legionella  - c/w supplemental O2 to keep SO2 > 88%, now on home 2L NC  - c/w NIPPV QHS, can use AVAPS - RR 18, EPAP 5, Min IP 15, Max IP 25,   - C/w Mirtazepine     Case discussed with Dr. Kushal Augustin PGY-5  Pulmonary/Critical Care Fellow  Pager: [phone] ([facility]), [phone] (NS)  Pulmonary Spectra #[id number] (NS) / [phone] ([facility])

## 2022-01-23 NOTE — PROGRESS NOTE ADULT - PROBLEM SELECTOR PLAN 1
EKG: Sinus tach @ 111, T inv AVL  RVP: COVID Positive           WBC: 12.57            Alk phos: 233  AST/ALT: 59/63          hsTrop: 17               UA neg  1/21 CXR - no radiographic evidence of pneumonia  - Tmax: 100.2  - isolation precautions, trend inflammatory markers  - on supplemental oxygen, monitor pulse ox  - started Remdesivir and Dexamethasone iv 1/21  - cont avaps at night EKG: Sinus tach @ 111, T inv AVL  RVP: COVID Positive           WBC: 12.57            Alk phos: 233  AST/ALT: 59/63          hsTrop: 17               UA neg  1/21 CXR - no radiographic evidence of pneumonia  - Tmax: 98.7   - isolation precautions, trend inflammatory markers  - on supplemental oxygen, monitor pulse ox  - started Remdesivir and Dexamethasone iv 1/21  - cont avaps at night

## 2022-01-23 NOTE — PROGRESS NOTE ADULT - SUBJECTIVE AND OBJECTIVE BOX
Interval Events:    REVIEW OF SYSTEMS:  Unable to assess ROS because pt is intubated and sedated    Vital Signs Last 24 Hrs  T(C): 36.6 (23 Jan 2022 10:12), Max: 37.1 (22 Jan 2022 15:20)  T(F): 97.9 (23 Jan 2022 10:12), Max: 98.7 (22 Jan 2022 15:20)  HR: 83 (23 Jan 2022 10:12) (60 - 90)  BP: 135/55 (23 Jan 2022 10:12) (113/67 - 135/55)  BP(mean): --  RR: 18 (23 Jan 2022 10:12) (18 - 20)  SpO2: 96% (23 Jan 2022 10:12) (96% - 100%)      Adult Advanced Hemodynamics Last 24 Hrs  CVP(mm Hg): --  CVP(cm H2O): --  CO: --  CI: --  PA: --  PA(mean): --  PCWP: --  SVR: --  SVRI: --  PVR: --  PVRI: --    I&O's Summary        ECMO SETTINGS:  Type:		[ ] Venovenous		[ ] Venoarterial  Cannulation Site(s):     Flow:  	          P1:                  Delta P:  RPM: 		  P2:                  SVO2:    Oxygenator:	Sweep:     L/min	FiO2:           VENTILATOR SETTINGS:     Mode: standby        PHYSICAL EXAM:  General:   HEENT:   Lymph Nodes:  Neck:   Respiratory:   Cardiovascular:   Abdomen:   Extremities:   Skin:   Neurological:  Psychiatry:      LABS:                          12.0   6.25  )-----------( 136      ( 23 Jan 2022 07:28 )             38.7                          01-23    140  |  103  |  12  ----------------------------<  76  3.4<L>   |  31  |  0.43<L>    Ca    8.6      23 Jan 2022 07:28  Phos  2.7     01-23  Mg     1.80     01-23    TPro  5.8<L>  /  Alb  2.5<L>  /  TBili  <0.2  /  DBili  x   /  AST  26  /  ALT  33  /  AlkPhos  144<H>  01-23      LIVER FUNCTIONS - ( 23 Jan 2022 07:28 )  Alb: 2.5 g/dL / Pro: 5.8 g/dL / ALK PHOS: 144 U/L / ALT: 33 U/L / AST: 26 U/L / GGT: x             PT/INR - ( 22 Jan 2022 07:16 )   PT: 10.7 sec;   INR: 0.93 ratio         PTT - ( 22 Jan 2022 07:16 )  PTT:28.7 sec      Culture - Blood (collected 21 Jan 2022 21:13)  Source: .Blood Blood-Peripheral  Preliminary Report (22 Jan 2022 22:01):    No growth to date.    Culture - Blood (collected 21 Jan 2022 21:13)  Source: .Blood Blood-Peripheral  Preliminary Report (22 Jan 2022 22:01):    No growth to date.    Culture - Sputum, Cystic Fibrosis (collected 21 Jan 2022 16:52)  Source: .Sputum  Gram Stain (21 Jan 2022 22:05):    Numerous polymorphonuclear leukocytes per low power field    No squamous epithelial cells per low power field    Numerous Gram Negative Rods per oil power field  Preliminary Report (22 Jan 2022 22:21):    Few Gram Negative Rods    Few Mixed upper respiratory prashanth    Culture - Urine (collected 21 Jan 2022 14:18)  Source: Clean Catch Clean Catch (Midstream)  Final Report (22 Jan 2022 12:37):    No growth          ACTIVE MEDS:    MEDICATIONS  (STANDING):  azithromycin  IVPB 500 milliGRAM(s) IV Intermittent every 24 hours  bisacodyl 5 milliGRAM(s) Oral every 12 hours  budesonide 160 MICROgram(s)/formoterol 4.5 MICROgram(s) Inhaler 2 Puff(s) Inhalation two times a day  calcium carbonate 1250 mG  + Vitamin D (OsCal 500 + D) 1 Tablet(s) Oral two times a day  dexAMETHasone  Injectable 6 milliGRAM(s) IV Push daily  dextrose 40% Gel 15 Gram(s) Oral once  dextrose 5%. 1000 milliLiter(s) (50 mL/Hr) IV Continuous <Continuous>  dextrose 5%. 1000 milliLiter(s) (100 mL/Hr) IV Continuous <Continuous>  dextrose 50% Injectable 25 Gram(s) IV Push once  dextrose 50% Injectable 12.5 Gram(s) IV Push once  dextrose 50% Injectable 25 Gram(s) IV Push once  enoxaparin Injectable 30 milliGRAM(s) SubCutaneous daily  famotidine    Tablet 40 milliGRAM(s) Oral at bedtime  ferrous    sulfate 325 milliGRAM(s) Oral daily  fluticasone propionate 50 MICROgram(s)/spray Nasal Spray 1 Spray(s) Both Nostrils two times a day  glucagon  Injectable 1 milliGRAM(s) IntraMuscular once  insulin glargine Injectable (LANTUS) 14 Unit(s) SubCutaneous at bedtime  insulin lispro (ADMELOG) corrective regimen sliding scale   SubCutaneous three times a day before meals  insulin lispro (ADMELOG) corrective regimen sliding scale   SubCutaneous at bedtime  insulin lispro Injectable (ADMELOG) 6 Unit(s) SubCutaneous three times a day before meals  ketotifen 0.025% Ophthalmic Solution 1 Drop(s) Both EYES two times a day  losartan 50 milliGRAM(s) Oral daily  meropenem  IVPB 1000 milliGRAM(s) IV Intermittent every 8 hours  metoprolol succinate ER 50 milliGRAM(s) Oral daily  mirtazapine 7.5 milliGRAM(s) Oral at bedtime  montelukast 10 milliGRAM(s) Oral daily  pantoprazole    Tablet 40 milliGRAM(s) Oral before breakfast  remdesivir  IVPB 100 milliGRAM(s) IV Intermittent every 24 hours  remdesivir  IVPB   IV Intermittent   senna 2 Tablet(s) Oral at bedtime  tiotropium 18 MICROgram(s) Capsule 1 Capsule(s) Inhalation daily

## 2022-01-23 NOTE — PROGRESS NOTE ADULT - ASSESSMENT
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 Young (Pulmonologist) for SOB and cough. Admitted to medicine for acute hypoxemia from COVID-19 with a superimposed bacterial pneumonia. 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 Young (Pulmonologist) for SOB and cough. Admitted to medicine for acute hypoxemia from COVID-19 with a superimposed bacterial pneumonia.

## 2022-01-24 LAB
-  AMIKACIN: SIGNIFICANT CHANGE UP
-  AMOXICILLIN/CLAVULANIC ACID: SIGNIFICANT CHANGE UP
-  AMPICILLIN/SULBACTAM: SIGNIFICANT CHANGE UP
-  AMPICILLIN: SIGNIFICANT CHANGE UP
-  AZTREONAM: SIGNIFICANT CHANGE UP
-  CEFAZOLIN: SIGNIFICANT CHANGE UP
-  CEFEPIME: SIGNIFICANT CHANGE UP
-  CEFOXITIN: SIGNIFICANT CHANGE UP
-  CEFTAZIDIME: SIGNIFICANT CHANGE UP
-  CEFTRIAXONE: SIGNIFICANT CHANGE UP
-  CIPROFLOXACIN: SIGNIFICANT CHANGE UP
-  ERTAPENEM: SIGNIFICANT CHANGE UP
-  GENTAMICIN: SIGNIFICANT CHANGE UP
-  IMIPENEM: SIGNIFICANT CHANGE UP
-  LEVOFLOXACIN: SIGNIFICANT CHANGE UP
-  MEROPENEM: SIGNIFICANT CHANGE UP
-  PIPERACILLIN/TAZOBACTAM: SIGNIFICANT CHANGE UP
-  PIPERACILLIN/TAZOBACTAM: SIGNIFICANT CHANGE UP
-  POLYMYXIN B: SIGNIFICANT CHANGE UP
-  TOBRAMYCIN: SIGNIFICANT CHANGE UP
-  TRIMETHOPRIM/SULFAMETHOXAZOLE: SIGNIFICANT CHANGE UP
-  TRIMETHOPRIM/SULFAMETHOXAZOLE: SIGNIFICANT CHANGE UP
ALBUMIN SERPL ELPH-MCNC: 2.6 G/DL — LOW (ref 3.3–5)
ALP SERPL-CCNC: 141 U/L — HIGH (ref 40–120)
ALT FLD-CCNC: 32 U/L — SIGNIFICANT CHANGE UP (ref 4–33)
ANION GAP SERPL CALC-SCNC: 8 MMOL/L — SIGNIFICANT CHANGE UP (ref 7–14)
AST SERPL-CCNC: 23 U/L — SIGNIFICANT CHANGE UP (ref 4–32)
BASOPHILS # BLD AUTO: 0.02 K/UL — SIGNIFICANT CHANGE UP (ref 0–0.2)
BASOPHILS NFR BLD AUTO: 0.3 % — SIGNIFICANT CHANGE UP (ref 0–2)
BILIRUB SERPL-MCNC: <0.2 MG/DL — SIGNIFICANT CHANGE UP (ref 0.2–1.2)
BUN SERPL-MCNC: 15 MG/DL — SIGNIFICANT CHANGE UP (ref 7–23)
CALCIUM SERPL-MCNC: 8.9 MG/DL — SIGNIFICANT CHANGE UP (ref 8.4–10.5)
CHLORIDE SERPL-SCNC: 107 MMOL/L — SIGNIFICANT CHANGE UP (ref 98–107)
CO2 SERPL-SCNC: 30 MMOL/L — SIGNIFICANT CHANGE UP (ref 22–31)
CREAT SERPL-MCNC: 0.35 MG/DL — LOW (ref 0.5–1.3)
CRP SERPL-MCNC: 5.4 MG/L — HIGH
D DIMER BLD IA.RAPID-MCNC: 180 NG/ML DDU — SIGNIFICANT CHANGE UP
EOSINOPHIL # BLD AUTO: 0.03 K/UL — SIGNIFICANT CHANGE UP (ref 0–0.5)
EOSINOPHIL NFR BLD AUTO: 0.5 % — SIGNIFICANT CHANGE UP (ref 0–6)
FERRITIN SERPL-MCNC: 230 NG/ML — HIGH (ref 15–150)
GLUCOSE BLDC GLUCOMTR-MCNC: 142 MG/DL — HIGH (ref 70–99)
GLUCOSE BLDC GLUCOMTR-MCNC: 54 MG/DL — CRITICAL LOW (ref 70–99)
GLUCOSE BLDC GLUCOMTR-MCNC: 59 MG/DL — LOW (ref 70–99)
GLUCOSE SERPL-MCNC: 72 MG/DL — SIGNIFICANT CHANGE UP (ref 70–99)
HCT VFR BLD CALC: 38.3 % — SIGNIFICANT CHANGE UP (ref 34.5–45)
HGB BLD-MCNC: 11.7 G/DL — SIGNIFICANT CHANGE UP (ref 11.5–15.5)
IANC: 2.63 K/UL — SIGNIFICANT CHANGE UP (ref 1.5–8.5)
IMM GRANULOCYTES NFR BLD AUTO: 0.5 % — SIGNIFICANT CHANGE UP (ref 0–1.5)
LDH SERPL L TO P-CCNC: 167 U/L — SIGNIFICANT CHANGE UP (ref 135–225)
LYMPHOCYTES # BLD AUTO: 2.37 K/UL — SIGNIFICANT CHANGE UP (ref 1–3.3)
LYMPHOCYTES # BLD AUTO: 41.4 % — SIGNIFICANT CHANGE UP (ref 13–44)
MAGNESIUM SERPL-MCNC: 1.8 MG/DL — SIGNIFICANT CHANGE UP (ref 1.6–2.6)
MCHC RBC-ENTMCNC: 27.9 PG — SIGNIFICANT CHANGE UP (ref 27–34)
MCHC RBC-ENTMCNC: 30.5 GM/DL — LOW (ref 32–36)
MCV RBC AUTO: 91.2 FL — SIGNIFICANT CHANGE UP (ref 80–100)
METHOD TYPE: SIGNIFICANT CHANGE UP
MONOCYTES # BLD AUTO: 0.64 K/UL — SIGNIFICANT CHANGE UP (ref 0–0.9)
MONOCYTES NFR BLD AUTO: 11.2 % — SIGNIFICANT CHANGE UP (ref 2–14)
NEUTROPHILS # BLD AUTO: 2.63 K/UL — SIGNIFICANT CHANGE UP (ref 1.8–7.4)
NEUTROPHILS NFR BLD AUTO: 46.1 % — SIGNIFICANT CHANGE UP (ref 43–77)
NRBC # BLD: 0 /100 WBCS — SIGNIFICANT CHANGE UP
NRBC # FLD: 0 K/UL — SIGNIFICANT CHANGE UP
PHOSPHATE SERPL-MCNC: 2.5 MG/DL — SIGNIFICANT CHANGE UP (ref 2.5–4.5)
PLATELET # BLD AUTO: 131 K/UL — LOW (ref 150–400)
POTASSIUM SERPL-MCNC: 3.7 MMOL/L — SIGNIFICANT CHANGE UP (ref 3.5–5.3)
POTASSIUM SERPL-SCNC: 3.7 MMOL/L — SIGNIFICANT CHANGE UP (ref 3.5–5.3)
PROCALCITONIN SERPL-MCNC: 0.04 NG/ML — SIGNIFICANT CHANGE UP (ref 0.02–0.1)
PROT SERPL-MCNC: 5.7 G/DL — LOW (ref 6–8.3)
RBC # BLD: 4.2 M/UL — SIGNIFICANT CHANGE UP (ref 3.8–5.2)
RBC # FLD: 13.3 % — SIGNIFICANT CHANGE UP (ref 10.3–14.5)
SODIUM SERPL-SCNC: 145 MMOL/L — SIGNIFICANT CHANGE UP (ref 135–145)
WBC # BLD: 5.72 K/UL — SIGNIFICANT CHANGE UP (ref 3.8–10.5)
WBC # FLD AUTO: 5.72 K/UL — SIGNIFICANT CHANGE UP (ref 3.8–10.5)

## 2022-01-24 PROCEDURE — 99233 SBSQ HOSP IP/OBS HIGH 50: CPT

## 2022-01-24 PROCEDURE — 99255 IP/OBS CONSLTJ NEW/EST HI 80: CPT

## 2022-01-24 RX ORDER — INSULIN LISPRO 100/ML
3 VIAL (ML) SUBCUTANEOUS ONCE
Refills: 0 | Status: COMPLETED | OUTPATIENT
Start: 2022-01-24 | End: 2022-01-24

## 2022-01-24 RX ORDER — INSULIN LISPRO 100/ML
5 VIAL (ML) SUBCUTANEOUS
Refills: 0 | Status: DISCONTINUED | OUTPATIENT
Start: 2022-01-24 | End: 2022-01-24

## 2022-01-24 RX ORDER — INSULIN GLARGINE 100 [IU]/ML
14 INJECTION, SOLUTION SUBCUTANEOUS AT BEDTIME
Refills: 0 | Status: DISCONTINUED | OUTPATIENT
Start: 2022-01-24 | End: 2022-01-25

## 2022-01-24 RX ORDER — INSULIN LISPRO 100/ML
3 VIAL (ML) SUBCUTANEOUS
Refills: 0 | Status: DISCONTINUED | OUTPATIENT
Start: 2022-01-24 | End: 2022-01-25

## 2022-01-24 RX ORDER — INSULIN LISPRO 100/ML
VIAL (ML) SUBCUTANEOUS
Refills: 0 | Status: DISCONTINUED | OUTPATIENT
Start: 2022-01-24 | End: 2022-01-29

## 2022-01-24 RX ADMIN — MIRTAZAPINE 7.5 MILLIGRAM(S): 45 TABLET, ORALLY DISINTEGRATING ORAL at 21:36

## 2022-01-24 RX ADMIN — Medication 50 MILLIGRAM(S): at 06:58

## 2022-01-24 RX ADMIN — KETOTIFEN FUMARATE 1 DROP(S): 0.34 SOLUTION OPHTHALMIC at 17:41

## 2022-01-24 RX ADMIN — KETOTIFEN FUMARATE 1 DROP(S): 0.34 SOLUTION OPHTHALMIC at 06:58

## 2022-01-24 RX ADMIN — Medication 1 TABLET(S): at 17:41

## 2022-01-24 RX ADMIN — PANTOPRAZOLE SODIUM 40 MILLIGRAM(S): 20 TABLET, DELAYED RELEASE ORAL at 06:58

## 2022-01-24 RX ADMIN — Medication 6 MILLIGRAM(S): at 06:56

## 2022-01-24 RX ADMIN — Medication 1 SPRAY(S): at 06:57

## 2022-01-24 RX ADMIN — Medication 600 MILLIGRAM(S): at 17:42

## 2022-01-24 RX ADMIN — Medication 4: at 22:17

## 2022-01-24 RX ADMIN — REMDESIVIR 500 MILLIGRAM(S): 5 INJECTION INTRAVENOUS at 22:18

## 2022-01-24 RX ADMIN — Medication 6: at 13:29

## 2022-01-24 RX ADMIN — LOSARTAN POTASSIUM 50 MILLIGRAM(S): 100 TABLET, FILM COATED ORAL at 06:58

## 2022-01-24 RX ADMIN — BUDESONIDE AND FORMOTEROL FUMARATE DIHYDRATE 2 PUFF(S): 160; 4.5 AEROSOL RESPIRATORY (INHALATION) at 21:35

## 2022-01-24 RX ADMIN — TIOTROPIUM BROMIDE 1 CAPSULE(S): 18 CAPSULE ORAL; RESPIRATORY (INHALATION) at 09:20

## 2022-01-24 RX ADMIN — Medication 1 TABLET(S): at 06:57

## 2022-01-24 RX ADMIN — Medication 3 UNIT(S): at 10:43

## 2022-01-24 RX ADMIN — Medication 1 SPRAY(S): at 17:41

## 2022-01-24 RX ADMIN — MEROPENEM 100 MILLIGRAM(S): 1 INJECTION INTRAVENOUS at 21:35

## 2022-01-24 RX ADMIN — BUDESONIDE AND FORMOTEROL FUMARATE DIHYDRATE 2 PUFF(S): 160; 4.5 AEROSOL RESPIRATORY (INHALATION) at 09:19

## 2022-01-24 RX ADMIN — Medication 6: at 18:23

## 2022-01-24 RX ADMIN — Medication 5 MILLIGRAM(S): at 06:57

## 2022-01-24 RX ADMIN — Medication 325 MILLIGRAM(S): at 12:50

## 2022-01-24 RX ADMIN — Medication 40 MILLIEQUIVALENT(S): at 12:50

## 2022-01-24 RX ADMIN — ENOXAPARIN SODIUM 30 MILLIGRAM(S): 100 INJECTION SUBCUTANEOUS at 12:49

## 2022-01-24 RX ADMIN — Medication 3 UNIT(S): at 18:22

## 2022-01-24 RX ADMIN — INSULIN GLARGINE 14 UNIT(S): 100 INJECTION, SOLUTION SUBCUTANEOUS at 22:18

## 2022-01-24 RX ADMIN — MONTELUKAST 10 MILLIGRAM(S): 4 TABLET, CHEWABLE ORAL at 12:50

## 2022-01-24 RX ADMIN — Medication 600 MILLIGRAM(S): at 06:59

## 2022-01-24 RX ADMIN — FAMOTIDINE 40 MILLIGRAM(S): 10 INJECTION INTRAVENOUS at 21:36

## 2022-01-24 RX ADMIN — MEROPENEM 100 MILLIGRAM(S): 1 INJECTION INTRAVENOUS at 13:54

## 2022-01-24 RX ADMIN — MEROPENEM 100 MILLIGRAM(S): 1 INJECTION INTRAVENOUS at 06:56

## 2022-01-24 RX ADMIN — Medication 3 UNIT(S): at 13:44

## 2022-01-24 NOTE — CONSULT NOTE ADULT - SUBJECTIVE AND OBJECTIVE BOX
HPI:  65 y/o Leandro speaking Female, with a PmHx of Diffuse Cystic Bronchiectasis and Primary Ciliary Dyskinesia on 2L NC QD and AVAPS QHS, Chronic Sinusitis, ABPA (unclear history, aspergillus AB negative from 8/2020), HTN, GERD, IDDM2, Depression, Nodula Liver and CBD stricture s/p PTC (1998 in [place], now removed) and recent admission in April 2021 for  Pseudomonas PNA and July 2021 for Pseudomonas PNA, presented to the [facility] ED after being seen as a tele visit by Dr. Young (Pulmonologist) for SOB and cough. Pt states her whole family was sick with COVID about 3 weeks ago but she didn't get sick at that time. She states about 2 weeks ago she started to get SOB but over the past 3 days her symptoms were getting worse. Daughter states she is unvaccinated for COVID and was having fever (Tmax 101) with chills, coughing, HA, dizziness and some nausea but denies any chest pain, blurred vision, abd pain, diarrhea, any loss of taste or smell. Daughter states since her symptoms were getting worse they had a tele visit with Hannah today and was advised to come to the [facility] ED for an evaluation. In the [facility] ED, she was found to be COVID positive and was desaturating so her oxygen was increased to 6 LPM NC with improvement of her sats. CXR done showed no evidence of pna. She was seen by House Pulm and recommended to start Remdesivir, Dexamethasone, Meropenum and Azithromycin for a superimposed infection with COVID-19. Admitted to medicine. (21 Jan 2022 18:44)      PAST MEDICAL & SURGICAL HISTORY:  ABPA (allergic bronchopulmonary aspergillosis)    Bronchiectasis    Hypertension    Vitamin D deficiency    Microcytic anemia    GERD (gastroesophageal reflux disease)    Postnasal drip    Pseudomonas aeruginosa colonization    Allergic rhinitis    Recurrent pneumonia    Type 2 diabetes mellitus, with long-term current use of insulin    Anxiety and depression    History of cholecystectomy    S/P dilatation of esophageal stricture        Allergies    No Known Allergies    Intolerances        ANTIMICROBIALS:  meropenem  IVPB 1000 every 8 hours  remdesivir  IVPB 100 every 24 hours  remdesivir  IVPB        OTHER MEDS:  acetaminophen     Tablet .. 650 milliGRAM(s) Oral every 6 hours PRN  ALBUTerol    90 MICROgram(s) HFA Inhaler 2 Puff(s) Inhalation every 6 hours PRN  bisacodyl 5 milliGRAM(s) Oral every 12 hours  budesonide 160 MICROgram(s)/formoterol 4.5 MICROgram(s) Inhaler 2 Puff(s) Inhalation two times a day  calcium carbonate 1250 mG  + Vitamin D (OsCal 500 + D) 1 Tablet(s) Oral two times a day  dexAMETHasone  Injectable 6 milliGRAM(s) IV Push daily  dextrose 40% Gel 15 Gram(s) Oral once  dextrose 5%. 1000 milliLiter(s) IV Continuous <Continuous>  dextrose 5%. 1000 milliLiter(s) IV Continuous <Continuous>  dextrose 50% Injectable 25 Gram(s) IV Push once  dextrose 50% Injectable 12.5 Gram(s) IV Push once  dextrose 50% Injectable 25 Gram(s) IV Push once  enoxaparin Injectable 30 milliGRAM(s) SubCutaneous daily  famotidine    Tablet 40 milliGRAM(s) Oral at bedtime  ferrous    sulfate 325 milliGRAM(s) Oral daily  fluticasone propionate 50 MICROgram(s)/spray Nasal Spray 1 Spray(s) Both Nostrils two times a day  glucagon  Injectable 1 milliGRAM(s) IntraMuscular once  guaiFENesin  milliGRAM(s) Oral every 12 hours  insulin glargine Injectable (LANTUS) 14 Unit(s) SubCutaneous at bedtime  insulin lispro (ADMELOG) corrective regimen sliding scale   SubCutaneous three times a day before meals  insulin lispro (ADMELOG) corrective regimen sliding scale   SubCutaneous at bedtime  insulin lispro Injectable (ADMELOG) 3 Unit(s) SubCutaneous three times a day before meals  ketotifen 0.025% Ophthalmic Solution 1 Drop(s) Both EYES two times a day  losartan 50 milliGRAM(s) Oral daily  metoprolol succinate ER 50 milliGRAM(s) Oral daily  mirtazapine 7.5 milliGRAM(s) Oral at bedtime  montelukast 10 milliGRAM(s) Oral daily  pantoprazole    Tablet 40 milliGRAM(s) Oral before breakfast  senna 2 Tablet(s) Oral at bedtime  tiotropium 18 MICROgram(s) Capsule 1 Capsule(s) Inhalation daily      SOCIAL HISTORY:  from [place], lives with family  no smoking, alcohol or drug abuse  no recent travel    FAMILY HISTORY:  all family had COVID 2-3 weeks ago    Family history of allergic rhinitis (Child, Child)  son, daughter    Family history of bronchitis  mother, father        ROS:    All other systems negative     Constitutional: + fever at home  Eye: no eye pain, no redness, no vision changes  ENT:  no sore throat, no rhinorrhea  Cardiovascular:  no chest pain, no palpitation  Respiratory:    productive cough  GI:  no abd pain, no vomiting, no diarrhea  urinary: no dysuria, no hematuria, no flank pain  : no vaginal discharge or bleeding  musculoskeletal:  no joint pain, no joint swelling  skin:  no rash  neurology:  no headache, no seizure, no change in mental status  psych: no anxiety, no depression     Physical Exam:    General:    NAD, non toxic  Head: atraumatic, normocephalic  Eyes: normal sclera and conjunctiva  ENT:   no oropharyngeal lesions, no LAD, neck supple  Cardio:    regular S1,S2  Respiratory:  comfortable on NC  abd:   soft, BS +, not tender  :     no CVAT, no suprapubic tenderness, no francisco  Musculoskeletal : no joint swelling, no edema  Skin:    no rash  vascular: no phlebitis  Neurologic:     no focal deficits  psych: normal affect      Drug Dosing Weight  Height (cm): 162.6 (21 Jan 2022 18:44)  Weight (kg): 40 (21 Jan 2022 18:44)  BMI (kg/m2): 15.1 (21 Jan 2022 18:44)  BSA (m2): 1.38 (21 Jan 2022 18:44)    Vital Signs Last 24 Hrs  T(F): 97.8 (01-24-22 @ 07:01), Max: 100.2 (01-21-22 @ 12:51)    Vital Signs Last 24 Hrs  HR: 73 (01-24-22 @ 07:01) (71 - 73)  BP: 137/62 (01-24-22 @ 07:01) (115/54 - 137/62)  RR: 18 (01-24-22 @ 07:01)  SpO2: 100% (01-24-22 @ 07:01) (95% - 100%)  Wt(kg): --                          11.7   5.72  )-----------( 131      ( 24 Jan 2022 06:38 )             38.3       01-24    145  |  107  |  15  ----------------------------<  72  3.7   |  30  |  0.35<L>    Ca    8.9      24 Jan 2022 06:38  Phos  2.5     01-24  Mg     1.80     01-24    TPro  5.7<L>  /  Alb  2.6<L>  /  TBili  <0.2  /  DBili  x   /  AST  23  /  ALT  32  /  AlkPhos  141<H>  01-24          MICROBIOLOGY:  v  .Blood Blood-Peripheral  01-21-22   No growth to date.  --  --      .Sputum  01-21-22   Few Pseudomonas aeruginosa  Few Klebsiella pneumoniae  Rare Elizabethkingia meningoseptica  Few Mixed upper respiratory prashanth  --    Numerous polymorphonuclear leukocytes per low power field  No squamous epithelial cells per low power field  Numerous Gram Negative Rods per oil power field      Clean Catch Clean Catch (Midstream)  01-21-22   No growth  --  --          Rapid RVP Result: Detected (01-21 @ 15:08)          RADIOLOGY:    Images independently visualized and reviewed personally,  findings as below    < from: Xray Chest 1 View- PORTABLE-Urgent (01.21.22 @ 14:32) >  IMPRESSION:    No radiographic evidence of pneumonia.    < end of copied text >   HPI:  65 y/o Leandro speaking Female, with a PmHx of Diffuse Cystic Bronchiectasis and Primary Ciliary Dyskinesia on 2L NC QD and AVAPS QHS, Chronic Sinusitis, ABPA (unclear history, aspergillus AB negative from 8/2020), HTN, GERD, IDDM2, Depression, Nodula Liver and CBD stricture s/p PTC (1998 in [place], now removed) and recent admission in April 2021 for  Pseudomonas PNA and July 2021 for Pseudomonas PNA, presented to the [facility] ED after being seen as a tele visit by Dr. Young (Pulmonologist) for SOB and cough. Pt states her whole family was sick with COVID about 3 weeks ago but she didn't get sick at that time. She states about 2 weeks ago she started to get SOB but over the past 3 days her symptoms were getting worse. Daughter states she is unvaccinated for COVID and was having fever (Tmax 101) with chills, coughing, HA, dizziness and some nausea but denies any chest pain, blurred vision, abd pain, diarrhea, any loss of taste or smell. Daughter states since her symptoms were getting worse they had a tele visit with Hannah today and was advised to come to the [facility] ED for an evaluation. In the [facility] ED, she was found to be COVID positive and was desaturating so her oxygen was increased to 6 LPM NC with improvement of her sats. CXR done showed no evidence of pna. She was seen by House Pulm and recommended to start Remdesivir, Dexamethasone, Meropenum and Azithromycin for a superimposed infection with COVID-19. Admitted to medicine. (21 Jan 2022 18:44)      PAST MEDICAL & SURGICAL HISTORY:  ABPA (allergic bronchopulmonary aspergillosis)    Bronchiectasis    Hypertension    Vitamin D deficiency    Microcytic anemia    GERD (gastroesophageal reflux disease)    Postnasal drip    Pseudomonas aeruginosa colonization    Allergic rhinitis    Recurrent pneumonia    Type 2 diabetes mellitus, with long-term current use of insulin    Anxiety and depression    History of cholecystectomy    S/P dilatation of esophageal stricture        Allergies    No Known Allergies    Intolerances        ANTIMICROBIALS:  meropenem  IVPB 1000 every 8 hours  remdesivir  IVPB 100 every 24 hours  remdesivir  IVPB        OTHER MEDS:  acetaminophen     Tablet .. 650 milliGRAM(s) Oral every 6 hours PRN  ALBUTerol    90 MICROgram(s) HFA Inhaler 2 Puff(s) Inhalation every 6 hours PRN  bisacodyl 5 milliGRAM(s) Oral every 12 hours  budesonide 160 MICROgram(s)/formoterol 4.5 MICROgram(s) Inhaler 2 Puff(s) Inhalation two times a day  calcium carbonate 1250 mG  + Vitamin D (OsCal 500 + D) 1 Tablet(s) Oral two times a day  dexAMETHasone  Injectable 6 milliGRAM(s) IV Push daily  dextrose 40% Gel 15 Gram(s) Oral once  dextrose 5%. 1000 milliLiter(s) IV Continuous <Continuous>  dextrose 5%. 1000 milliLiter(s) IV Continuous <Continuous>  dextrose 50% Injectable 25 Gram(s) IV Push once  dextrose 50% Injectable 12.5 Gram(s) IV Push once  dextrose 50% Injectable 25 Gram(s) IV Push once  enoxaparin Injectable 30 milliGRAM(s) SubCutaneous daily  famotidine    Tablet 40 milliGRAM(s) Oral at bedtime  ferrous    sulfate 325 milliGRAM(s) Oral daily  fluticasone propionate 50 MICROgram(s)/spray Nasal Spray 1 Spray(s) Both Nostrils two times a day  glucagon  Injectable 1 milliGRAM(s) IntraMuscular once  guaiFENesin  milliGRAM(s) Oral every 12 hours  insulin glargine Injectable (LANTUS) 14 Unit(s) SubCutaneous at bedtime  insulin lispro (ADMELOG) corrective regimen sliding scale   SubCutaneous three times a day before meals  insulin lispro (ADMELOG) corrective regimen sliding scale   SubCutaneous at bedtime  insulin lispro Injectable (ADMELOG) 3 Unit(s) SubCutaneous three times a day before meals  ketotifen 0.025% Ophthalmic Solution 1 Drop(s) Both EYES two times a day  losartan 50 milliGRAM(s) Oral daily  metoprolol succinate ER 50 milliGRAM(s) Oral daily  mirtazapine 7.5 milliGRAM(s) Oral at bedtime  montelukast 10 milliGRAM(s) Oral daily  pantoprazole    Tablet 40 milliGRAM(s) Oral before breakfast  senna 2 Tablet(s) Oral at bedtime  tiotropium 18 MICROgram(s) Capsule 1 Capsule(s) Inhalation daily      SOCIAL HISTORY:  from [place], lives with family  no smoking, alcohol or drug abuse  no recent travel    FAMILY HISTORY:  all family had COVID 2-3 weeks ago    Family history of allergic rhinitis (Child, Child)  son, daughter    Family history of bronchitis  mother, father        ROS:    All other systems negative     Constitutional: + fever at home  Eye: no eye pain, no redness, no vision changes  ENT:   sore throat, no rhinorrhea  Cardiovascular:  no chest pain, no palpitation  Respiratory:    productive cough  GI:  no abd pain, no vomiting, no diarrhea  urinary: no dysuria, no hematuria, no flank pain  : no vaginal discharge or bleeding  musculoskeletal:  no joint pain, no joint swelling  skin:  no rash  neurology:  no headache, no seizure, no change in mental status  psych: no anxiety, no depression     Physical Exam:    General:    NAD, non toxic  Head: atraumatic, normocephalic  Eyes: normal sclera and conjunctiva  ENT:   no oropharyngeal lesions, no LAD, neck supple  Cardio:    regular S1,S2  Respiratory:  comfortable on NC  abd:   soft, BS +, not tender  :     no CVAT, no suprapubic tenderness, no francisco  Musculoskeletal : no joint swelling, no edema  Skin:    no rash  vascular: no phlebitis  Neurologic:     no focal deficits  psych: normal affect      Drug Dosing Weight  Height (cm): 162.6 (21 Jan 2022 18:44)  Weight (kg): 40 (21 Jan 2022 18:44)  BMI (kg/m2): 15.1 (21 Jan 2022 18:44)  BSA (m2): 1.38 (21 Jan 2022 18:44)    Vital Signs Last 24 Hrs  T(F): 97.8 (01-24-22 @ 07:01), Max: 100.2 (01-21-22 @ 12:51)    Vital Signs Last 24 Hrs  HR: 73 (01-24-22 @ 07:01) (71 - 73)  BP: 137/62 (01-24-22 @ 07:01) (115/54 - 137/62)  RR: 18 (01-24-22 @ 07:01)  SpO2: 100% (01-24-22 @ 07:01) (95% - 100%)  Wt(kg): --                          11.7   5.72  )-----------( 131      ( 24 Jan 2022 06:38 )             38.3       01-24    145  |  107  |  15  ----------------------------<  72  3.7   |  30  |  0.35<L>    Ca    8.9      24 Jan 2022 06:38  Phos  2.5     01-24  Mg     1.80     01-24    TPro  5.7<L>  /  Alb  2.6<L>  /  TBili  <0.2  /  DBili  x   /  AST  23  /  ALT  32  /  AlkPhos  141<H>  01-24          MICROBIOLOGY:  v  .Blood Blood-Peripheral  01-21-22   No growth to date.  --  --      .Sputum  01-21-22   Few Pseudomonas aeruginosa  Few Klebsiella pneumoniae  Rare Elizabethkingia meningoseptica  Few Mixed upper respiratory prashanth  --    Numerous polymorphonuclear leukocytes per low power field  No squamous epithelial cells per low power field  Numerous Gram Negative Rods per oil power field      Clean Catch Clean Catch (Midstream)  01-21-22   No growth  --  --          Rapid RVP Result: Detected (01-21 @ 15:08)          RADIOLOGY:    Images independently visualized and reviewed personally,  findings as below    < from: Xray Chest 1 View- PORTABLE-Urgent (01.21.22 @ 14:32) >  IMPRESSION:    No radiographic evidence of pneumonia.    < end of copied text >

## 2022-01-24 NOTE — PROGRESS NOTE ADULT - ASSESSMENT
Patient is a a 65 yo F w/ Bronchiectasis, chronic hypercapnic/hypoxemic respiratory failure (2 to 5L O2/IVAPS - RR 18, EPAP 5, PS 15-25, average TV is 443), Liver Cirrohsis with prior CBD stricture s/p dilation and HCV ab+ who presents with fevers, productive sputum and wheezing, found to be COVID positive.     #Acute on Chronic hypoxemic/hypercapnic respiratory failure   - Acute bronchiectasis flare 2/2 COVID infection  - Self medicated w/ Augmentin and Prednisone 40mg daily for 3 days   - CXR 1/21 showed diffuse bilateral coarse reticular markings grossly unchanged from prior CXR  - C/w Duonebs q6h and ATC/3% Saline  - aerobika prn for airway clearance, please have at bedside  - C/w Decadron 6mg PO daily x7 days as patient already took Pred 40mg x 3 days to end 1/28  - C/w Remdesivir  - s/p azithromycin, continue meropenem for Pseudomonas and Klebsiellla in the sputum  - f/u sensitivities  - c/w supplemental O2 to keep SO2 > 88%, now on home 2L NC  - c/w NIPPV QHS, can use AVAPS - RR 18, EPAP 5, Min IP 15, Max IP 25,   - C/w Mirtazapine     Jose De Jesus Mead PGY-6  Pulmonary/Critical Care Fellow  Pager: [phone] ([facility]) [phone] (NS)  Pulmonary Spectra #[id number] (NS) / [phone] ([facility])     Patient is a a 67 yo F w/ Bronchiectasis, chronic hypercapnic/hypoxemic respiratory failure (2 to 5L O2/IVAPS - RR 18, EPAP 5, PS 15-25, average TV is 443), Liver Cirrohsis with prior CBD stricture s/p dilation and HCV ab+ who presents with fevers, productive sputum and wheezing, found to be COVID positive.     #Acute on Chronic hypoxemic/hypercapnic respiratory failure   - Acute bronchiectasis flare 2/2 bacterial process superimposed by COVID infection  - Self medicated w/ Augmentin and Prednisone 40mg daily for 3 days   - CXR 1/21 showed diffuse bilateral coarse reticular markings grossly unchanged from prior CXR  - C/w Duonebs q6h and ATC/3% Saline  - aerobika prn for airway clearance, please have at bedside  - C/w Decadron 6mg PO daily x7 days as patient already took Pred 40mg x 3 days to end 1/28  - C/w Remdesivir  - s/p azithromycin, continue meropenem for Pseudomonas and Klebsiellla in the sputum  - both Pseudomonas and Klebsiella are covered by meropenem based on sensitivities  - c/w supplemental O2 to keep SO2 > 88%, now on home 2L NC  - c/w NIPPV QHS, can use AVAPS - RR 18, EPAP 5, Min IP 15, Max IP 25,   - C/w Mirtazapine and please restart patients venlafaxine as she is complaining of severe anxiety    Jose De Jesus Mead PGY-6  Pulmonary/Critical Care Fellow  Pager: [phone] ([facility]) [phone] ([facility])  Pulmonary Spectra #[id number] (NS) / [phone] ([facility])

## 2022-01-24 NOTE — PROGRESS NOTE ADULT - ASSESSMENT
67 yo F w/ diffuse cystic bronchiectasis and primary ciliary dyskinesia, on chronic home O2 and AVAPS qhs, ABPA, HTN, GERD, DM2 on long term insulin therapy, depression, liver cirrhosis and CBD stricture, recent admission for  pseudomonas PNA, admitted w/ acute on chronic hypoxic/hypercapnic respiratory failure 2/2 COVID19/bronchiectasis flare and concern for superimposed bacterial pneumonia.     # Acute on chronic hypoxic/hypercapnic respiratory failure, COVID19 infection, bronchiectasis, potential superimposed pneumonia  - pulm and ID input appreciated  - c/w duonebs, ATC 3% saline, aerobika device  - c/w supplemental O2, AVAPS qhs, monitor respiratory status closely  - ID recs appreciated, sputum cx polymicrobial w/ pseudomonas, klebsiella, elizabethkingia, f/u sensitivities, c/w meropenem, anticipate 7 day course  - c/w remdesivir for 5 day course  - c/w dexamethasone for 10 day course    # Loose BM  - reports 2 episodes of diarrhea this AM  - monitor stool counts, if persistent, watery, check stool studies, GI PCR, c.diff  - hold bowel regimen 67 yo F w/ diffuse cystic bronchiectasis and primary ciliary dyskinesia, on chronic home O2 and AVAPS qhs, ABPA, HTN, GERD, DM2 on long term insulin therapy, depression, liver cirrhosis and CBD stricture, recent admission for  pseudomonas PNA, admitted w/ acute on chronic hypoxic/hypercapnic respiratory failure 2/2 COVID19/bronchiectasis flare and concern for superimposed bacterial pneumonia.     # Acute on chronic hypoxic/hypercapnic respiratory failure, COVID19 infection, bronchiectasis, potential superimposed pneumonia  - pulm and ID input appreciated  - c/w duonebs, ATC 3% saline, aerobika device  - c/w supplemental O2, AVAPS qhs, monitor respiratory status closely  - ID recs appreciated, sputum cx polymicrobial w/ pseudomonas, klebsiella, elizabethkingia, f/u sensitivities, c/w meropenem, anticipate 7 day course  - c/w remdesivir for 5 day course  - c/w dexamethasone for 10 day course  - c/w montelukast, Spiriva, Symbicort Flonase    # Loose BM  - reports 2 episodes of diarrhea this AM  - monitor stool counts, if persistent, watery, check stool studies, GI PCR, c.diff  - hold bowel regimen    # DM2 c/b steroid induced hyperglycemia and also by hypoglycemia  - will reduce Lantus dose back to 14U  - continue to monitor FS and adjust basal/bolus regimen as needed, monitor FS, continue w/ sliding scale coverage  - A1C 9.4    # Essential HTN    # GERD  - c/w protonix, pepcid    # Depression    VTE ppx: lovenox    Dispo: pending improvement in respiratory status 67 yo F w/ diffuse cystic bronchiectasis and primary ciliary dyskinesia, on chronic home O2 and AVAPS qhs, ABPA, HTN, GERD, DM2 on long term insulin therapy, depression, liver cirrhosis and CBD stricture, recent admission for  pseudomonas PNA, admitted w/ acute on chronic hypoxic/hypercapnic respiratory failure 2/2 COVID19/bronchiectasis flare and concern for superimposed bacterial pneumonia.     # Acute on chronic hypoxic/hypercapnic respiratory failure, COVID19 infection, bronchiectasis, potential superimposed pneumonia  - pulm and ID input appreciated  - c/w duonebs, ATC 3% saline, aerobika device  - c/w supplemental O2, AVAPS qhs, monitor respiratory status closely  - ID recs appreciated, sputum cx polymicrobial w/ pseudomonas, klebsiella, elizabethkingia, f/u sensitivities, c/w meropenem, anticipate 7 day course  - c/w remdesivir for 5 day course  - c/w dexamethasone for 10 day course  - c/w montelukast, Spiriva, Symbicort Flonase    # Loose BM  - reports 2 episodes of diarrhea this AM  - monitor stool counts, if persistent, watery, check stool studies, GI PCR, c.diff  - hold bowel regimen    # DM2 c/b steroid induced hyperglycemia and also by hypoglycemia  - will reduce Lantus dose back to 14U  - continue to monitor FS and adjust basal/bolus regimen as needed, monitor FS, continue w/ sliding scale coverage  - A1C 9.4    # Essential HTN  - c/w losartan, toprol xl    # GERD  - c/w protonix, pepcid    # Depression  c/w remeron    VTE ppx: lovenox    Dispo: pending improvement in respiratory status

## 2022-01-24 NOTE — CONSULT NOTE ADULT - ASSESSMENT
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), previous admissions for  Pseudomonas PNA but last pseudomonas was not CRE, now sent  by pulmonary for fever and cough, all family members had COVID 2-3 weeks ago but she stated to get dyspnea about 2 weeks ago and now worse  here afebrile, WBC: 12  COVID positive, legionella negative  blood cx negative, sputum cx with pseudomonas, klebsiella and elizabethkingia   CXR: no evidence of bacterial pneumonia     cystic bronchiectasis and ciliary dyskinesia  on home O2 now with fever, cough due to COVID (unvaccinated), ?bronchiectasis exacerbation  sputum cx polymicrobial with pseudomonas, klebsiella and elizabethkingia which is more s/o colonization, no consolidation on CXR    * f/u the final sputum cx and sensitivities  * c/w geoff for now started 1/21, now day 4, will likely do a 7 day course  * c/w remdesivir to complete a 5 day course  * monitor the renal function and LFTs  * c/w dexa for up to a 10 day course  * monitor the O2 sat    The above assessment and plan was discussed with the primary team and pulmonary    Urmila Rose MD  Pager [phone]  After 5pm and on weekends call [phone] 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), previous admissions for  Pseudomonas PNA but last pseudomonas was not CRE, now sent  by pulmonary for fever and cough, all family members had COVID 2-3 weeks ago but she stated to get dyspnea about 2 weeks ago and now worse  here afebrile, WBC: 12  COVID positive, legionella negative  blood cx negative, sputum cx with pseudomonas, klebsiella and elizabethkingia   CXR: no evidence of bacterial pneumonia     cystic bronchiectasis and ciliary dyskinesia  on home O2 now with fever, cough due to COVID (unvaccinated), ?bronchiectasis exacerbation  sputum cx polymicrobial with pseudomonas, klebsiella and elizabethkingia which is more s/o colonization, no consolidation on CXR    * f/u the final sputum cx and sensitivities  * c/w geoff for now started 1/21, now day 4  * c/w remdesivir to complete a 5 day course  * monitor the renal function and LFTs  * c/w dexa for up to a 10 day course  * monitor the O2 sat    The above assessment and plan was discussed with the primary team and pulmonary    Urmila Rose MD  Pager [phone]  After 5pm and on weekends call [phone]

## 2022-01-24 NOTE — PROGRESS NOTE ADULT - SUBJECTIVE AND OBJECTIVE BOX
[facility] Medicine  Pager [phone]    Patient is a 66y old  Female who presents with a chief complaint of SOB (24 Jan 2022 08:34)      INTERVAL HPI/OVERNIGHT EVENTS:    MEDICATIONS  (STANDING):  bisacodyl 5 milliGRAM(s) Oral every 12 hours  budesonide 160 MICROgram(s)/formoterol 4.5 MICROgram(s) Inhaler 2 Puff(s) Inhalation two times a day  calcium carbonate 1250 mG  + Vitamin D (OsCal 500 + D) 1 Tablet(s) Oral two times a day  dexAMETHasone  Injectable 6 milliGRAM(s) IV Push daily  dextrose 40% Gel 15 Gram(s) Oral once  dextrose 5%. 1000 milliLiter(s) (50 mL/Hr) IV Continuous <Continuous>  dextrose 5%. 1000 milliLiter(s) (100 mL/Hr) IV Continuous <Continuous>  dextrose 50% Injectable 25 Gram(s) IV Push once  dextrose 50% Injectable 12.5 Gram(s) IV Push once  dextrose 50% Injectable 25 Gram(s) IV Push once  enoxaparin Injectable 30 milliGRAM(s) SubCutaneous daily  famotidine    Tablet 40 milliGRAM(s) Oral at bedtime  ferrous    sulfate 325 milliGRAM(s) Oral daily  fluticasone propionate 50 MICROgram(s)/spray Nasal Spray 1 Spray(s) Both Nostrils two times a day  glucagon  Injectable 1 milliGRAM(s) IntraMuscular once  guaiFENesin  milliGRAM(s) Oral every 12 hours  insulin glargine Injectable (LANTUS) 14 Unit(s) SubCutaneous at bedtime  insulin lispro (ADMELOG) corrective regimen sliding scale   SubCutaneous three times a day before meals  insulin lispro (ADMELOG) corrective regimen sliding scale   SubCutaneous at bedtime  insulin lispro Injectable (ADMELOG) 8 Unit(s) SubCutaneous three times a day before meals  ketotifen 0.025% Ophthalmic Solution 1 Drop(s) Both EYES two times a day  losartan 50 milliGRAM(s) Oral daily  meropenem  IVPB 1000 milliGRAM(s) IV Intermittent every 8 hours  metoprolol succinate ER 50 milliGRAM(s) Oral daily  mirtazapine 7.5 milliGRAM(s) Oral at bedtime  montelukast 10 milliGRAM(s) Oral daily  pantoprazole    Tablet 40 milliGRAM(s) Oral before breakfast  remdesivir  IVPB 100 milliGRAM(s) IV Intermittent every 24 hours  remdesivir  IVPB   IV Intermittent   senna 2 Tablet(s) Oral at bedtime  tiotropium 18 MICROgram(s) Capsule 1 Capsule(s) Inhalation daily    MEDICATIONS  (PRN):  acetaminophen     Tablet .. 650 milliGRAM(s) Oral every 6 hours PRN Temp greater or equal to 38C (100.4F), Mild Pain (1 - 3), Moderate Pain (4 - 6)  ALBUTerol    90 MICROgram(s) HFA Inhaler 2 Puff(s) Inhalation every 6 hours PRN Shortness of Breath and/or Wheezing      Allergies    No Known Allergies    Intolerances        REVIEW OF SYSTEMS:  Please see interval HPI:    Vital Signs Last 24 Hrs  T(C): 36.6 (24 Jan 2022 07:01), Max: 36.6 (23 Jan 2022 15:35)  T(F): 97.8 (24 Jan 2022 07:01), Max: 97.8 (23 Jan 2022 15:35)  HR: 73 (24 Jan 2022 07:01) (71 - 73)  BP: 137/62 (24 Jan 2022 07:01) (115/54 - 137/62)  BP(mean): --  RR: 18 (24 Jan 2022 07:01) (18 - 18)  SpO2: 100% (24 Jan 2022 07:01) (95% - 100%)  I&O's Detail    23 Jan 2022 07:01  -  24 Jan 2022 07:00  --------------------------------------------------------  IN:    IV PiggyBack: 350 mL    Oral Fluid: 400 mL    Sodium Chloride 0.9% Bolus: 500 mL  Total IN: 1250 mL    OUT:  Total OUT: 0 mL    Total NET: 1250 mL            PHYSICAL EXAM:  GENERAL:   HEAD:    EYES:   ENMT:   NECK:   NERVOUS SYSTEM:    CHEST/LUNG:   HEART:   ABDOMEN:   EXTREMITIES:    LYMPH:   SKIN:     LABS:                        11.7   5.72  )-----------( 131      ( 24 Jan 2022 06:38 )             38.3     24 Jan 2022 06:38    145    |  107    |  15     ----------------------------<  72     3.7     |  30     |  0.35     Ca    8.9        24 Jan 2022 06:38  Phos  2.5       24 Jan 2022 06:38  Mg     1.80      24 Jan 2022 06:38    TPro  5.7    /  Alb  2.6    /  TBili  <0.2   /  DBili  x      /  AST  23     /  ALT  32     /  AlkPhos  141    24 Jan 2022 06:38      CAPILLARY BLOOD GLUCOSE      POCT Blood Glucose.: 198 mg/dL (24 Jan 2022 10:16)  POCT Blood Glucose.: 142 mg/dL (24 Jan 2022 09:21)  POCT Blood Glucose.: 59 mg/dL (24 Jan 2022 08:57)  POCT Blood Glucose.: 54 mg/dL (24 Jan 2022 08:55)  POCT Blood Glucose.: 245 mg/dL (23 Jan 2022 22:04)  POCT Blood Glucose.: 480 mg/dL (23 Jan 2022 17:54)  POCT Blood Glucose.: 491 mg/dL (23 Jan 2022 17:53)  POCT Blood Glucose.: 396 mg/dL (23 Jan 2022 13:25)    BLOOD CULTURE  01-21 @ 21:13   No growth to date.  --  --  SPUTUM CULTURE  01-21 @ 16:52   Few Pseudomonas aeruginosa  Few Klebsiella pneumoniae  Rare Elizabethkingia meningoseptica  Few Mixed upper respiratory prashatnh  --  --  01-21 @ 14:18   No growth  --  --    RADIOLOGY & ADDITIONAL TESTS:    Imaging Personally Reviewed:  [ ] YES     Consultant(s) Notes Reviewed:      Care Discussed with Consultants/Other Providers: [facility] Medicine  Pager [phone]    Patient is a 66y old  Female who presents with a chief complaint of SOB (24 Jan 2022 08:34)      INTERVAL HPI/OVERNIGHT EVENTS:  Hypoglycemic this AM s/p intervention with improvement.   Got washed up by staff. [facility] interpretation provided by son Alejandra per patient request (declined phone ), patient declines worsening of respiratory status, "breathing is OK", but still having a tough time "bringing up the mucous". Reports 2 loose BMs. No other complaints at this time. Discussed pulm and ID followup, patient/son in agreement w/ plan.     MEDICATIONS  (STANDING):  bisacodyl 5 milliGRAM(s) Oral every 12 hours  budesonide 160 MICROgram(s)/formoterol 4.5 MICROgram(s) Inhaler 2 Puff(s) Inhalation two times a day  calcium carbonate 1250 mG  + Vitamin D (OsCal 500 + D) 1 Tablet(s) Oral two times a day  dexAMETHasone  Injectable 6 milliGRAM(s) IV Push daily  dextrose 40% Gel 15 Gram(s) Oral once  dextrose 5%. 1000 milliLiter(s) (50 mL/Hr) IV Continuous <Continuous>  dextrose 5%. 1000 milliLiter(s) (100 mL/Hr) IV Continuous <Continuous>  dextrose 50% Injectable 25 Gram(s) IV Push once  dextrose 50% Injectable 12.5 Gram(s) IV Push once  dextrose 50% Injectable 25 Gram(s) IV Push once  enoxaparin Injectable 30 milliGRAM(s) SubCutaneous daily  famotidine    Tablet 40 milliGRAM(s) Oral at bedtime  ferrous    sulfate 325 milliGRAM(s) Oral daily  fluticasone propionate 50 MICROgram(s)/spray Nasal Spray 1 Spray(s) Both Nostrils two times a day  glucagon  Injectable 1 milliGRAM(s) IntraMuscular once  guaiFENesin  milliGRAM(s) Oral every 12 hours  insulin glargine Injectable (LANTUS) 14 Unit(s) SubCutaneous at bedtime  insulin lispro (ADMELOG) corrective regimen sliding scale   SubCutaneous three times a day before meals  insulin lispro (ADMELOG) corrective regimen sliding scale   SubCutaneous at bedtime  insulin lispro Injectable (ADMELOG) 8 Unit(s) SubCutaneous three times a day before meals  ketotifen 0.025% Ophthalmic Solution 1 Drop(s) Both EYES two times a day  losartan 50 milliGRAM(s) Oral daily  meropenem  IVPB 1000 milliGRAM(s) IV Intermittent every 8 hours  metoprolol succinate ER 50 milliGRAM(s) Oral daily  mirtazapine 7.5 milliGRAM(s) Oral at bedtime  montelukast 10 milliGRAM(s) Oral daily  pantoprazole    Tablet 40 milliGRAM(s) Oral before breakfast  remdesivir  IVPB 100 milliGRAM(s) IV Intermittent every 24 hours  remdesivir  IVPB   IV Intermittent   senna 2 Tablet(s) Oral at bedtime  tiotropium 18 MICROgram(s) Capsule 1 Capsule(s) Inhalation daily    MEDICATIONS  (PRN):  acetaminophen     Tablet .. 650 milliGRAM(s) Oral every 6 hours PRN Temp greater or equal to 38C (100.4F), Mild Pain (1 - 3), Moderate Pain (4 - 6)  ALBUTerol    90 MICROgram(s) HFA Inhaler 2 Puff(s) Inhalation every 6 hours PRN Shortness of Breath and/or Wheezing    Allergies  No Known Allergies    Intolerances    REVIEW OF SYSTEMS:  Please see interval HPI:    Vital Signs Last 24 Hrs  T(C): 36.6 (24 Jan 2022 07:01), Max: 36.6 (23 Jan 2022 15:35)  T(F): 97.8 (24 Jan 2022 07:01), Max: 97.8 (23 Jan 2022 15:35)  HR: 73 (24 Jan 2022 07:01) (71 - 73)  BP: 137/62 (24 Jan 2022 07:01) (115/54 - 137/62)  RR: 18 (24 Jan 2022 07:01) (18 - 18)  SpO2: 100% (24 Jan 2022 07:01) (95% - 100%)  I&O's Detail    23 Jan 2022 07:01  -  24 Jan 2022 07:00  --------------------------------------------------------  IN:    IV PiggyBack: 350 mL    Oral Fluid: 400 mL    Sodium Chloride 0.9% Bolus: 500 mL  Total IN: 1250 mL    OUT:  Total OUT: 0 mL    Total NET: 1250 mL    PHYSICAL EXAM:  GENERAL: NAD, sitting in bed, +frequent coughing  HEAD:  NC/AT  EYES: EOMI, clear sclera/conjunctiva  ENMT: MMM  NECK: supple  NERVOUS SYSTEM: moving extremities, non-focal   CHEST/LUNG: NC in place, no respiratory distress on supplemental O2, coarse BS slight wheeze?  HEART: S1S2 RRR  ABDOMEN: soft, non-tender  EXTREMITIES:  no c/c/e    LABS:                        11.7   5.72  )-----------( 131      ( 24 Jan 2022 06:38 )             38.3     24 Jan 2022 06:38    145    |  107    |  15     ----------------------------<  72     3.7     |  30     |  0.35     Ca    8.9        24 Jan 2022 06:38  Phos  2.5       24 Jan 2022 06:38  Mg     1.80      24 Jan 2022 06:38    TPro  5.7    /  Alb  2.6    /  TBili  <0.2   /  DBili  x      /  AST  23     /  ALT  32     /  AlkPhos  141    24 Jan 2022 06:38      CAPILLARY BLOOD GLUCOSE  POCT Blood Glucose.: 198 mg/dL (24 Jan 2022 10:16)  POCT Blood Glucose.: 142 mg/dL (24 Jan 2022 09:21)  POCT Blood Glucose.: 59 mg/dL (24 Jan 2022 08:57)  POCT Blood Glucose.: 54 mg/dL (24 Jan 2022 08:55)  POCT Blood Glucose.: 245 mg/dL (23 Jan 2022 22:04)  POCT Blood Glucose.: 480 mg/dL (23 Jan 2022 17:54)  POCT Blood Glucose.: 491 mg/dL (23 Jan 2022 17:53)  POCT Blood Glucose.: 396 mg/dL (23 Jan 2022 13:25)    BLOOD CULTURE  01-21 @ 21:13   No growth to date.  --  --  SPUTUM CULTURE  01-21 @ 16:52   Few Pseudomonas aeruginosa  Few Klebsiella pneumoniae  Rare Elizabethkingia meningoseptica  Few Mixed upper respiratory prashanth  --  --  01-21 @ 14:18   No growth  --  --    RADIOLOGY & ADDITIONAL TESTS:    Imaging Personally Reviewed:  [ ] YES     Consultant(s) Notes Reviewed:  ID, Pulm    Care Discussed with Consultants/Other Providers: LION Lalitha, patient likely had steroid induced hyperglycemia yesterday which had prompted increase in insulin dose, however with hypoglycemia this AM, would reduce dose back to prior, continue to monitor FS, pulm following, ID consulted

## 2022-01-24 NOTE — PROGRESS NOTE ADULT - SUBJECTIVE AND OBJECTIVE BOX
INCOMPLETE    SUBJECTIVE/OVERNIGHT EVENTS:      14 point ROS negative except as noted above      OBJECTIVE:  ICU Vital Signs Last 24 Hrs  T(C): 36.6 (24 Jan 2022 07:01), Max: 36.6 (23 Jan 2022 10:12)  T(F): 97.8 (24 Jan 2022 07:01), Max: 97.9 (23 Jan 2022 10:12)  HR: 73 (24 Jan 2022 07:01) (71 - 83)  BP: 137/62 (24 Jan 2022 07:01) (115/54 - 137/62)  BP(mean): --  ABP: --  ABP(mean): --  RR: 18 (24 Jan 2022 07:01) (18 - 18)  SpO2: 100% (24 Jan 2022 07:01) (95% - 100%)    Mode: standby    01-23 @ 07:01  -  01-24 @ 07:00  --------------------------------------------------------  IN: 1000 mL / OUT: 0 mL / NET: 1000 mL      CAPILLARY BLOOD GLUCOSE      POCT Blood Glucose.: 245 mg/dL (23 Jan 2022 22:04)      PHYSICAL EXAM:  General: awake and alert, nontoxic appearing *** lying in bed  HEENT: NC/AT, EOMI b/l, conjunctiva normal, MMM  Lymph Nodes: no cervical LAD  Neck: supple. full range of motion  Respiratory: CTA b/l, no w/r/c, appears comfortable on ***, no conversational dyspnea or accessory muscle use  Cardiovascular: S1 S2 present, RRR, no m/r/g  Abdomen: soft, NT/ND, +BS  Extremities: no c/c/e  Skin: no rashes or lesions noted  Neurological: AAOx3, no focal deficits  Psychiatry: calm, cooperative    LINES:     HOSPITAL MEDICATIONS:  Standing Meds:  bisacodyl 5 milliGRAM(s) Oral every 12 hours  budesonide 160 MICROgram(s)/formoterol 4.5 MICROgram(s) Inhaler 2 Puff(s) Inhalation two times a day  calcium carbonate 1250 mG  + Vitamin D (OsCal 500 + D) 1 Tablet(s) Oral two times a day  dexAMETHasone  Injectable 6 milliGRAM(s) IV Push daily  dextrose 40% Gel 15 Gram(s) Oral once  dextrose 5%. 1000 milliLiter(s) IV Continuous <Continuous>  dextrose 5%. 1000 milliLiter(s) IV Continuous <Continuous>  dextrose 50% Injectable 25 Gram(s) IV Push once  dextrose 50% Injectable 12.5 Gram(s) IV Push once  dextrose 50% Injectable 25 Gram(s) IV Push once  enoxaparin Injectable 30 milliGRAM(s) SubCutaneous daily  famotidine    Tablet 40 milliGRAM(s) Oral at bedtime  ferrous    sulfate 325 milliGRAM(s) Oral daily  fluticasone propionate 50 MICROgram(s)/spray Nasal Spray 1 Spray(s) Both Nostrils two times a day  glucagon  Injectable 1 milliGRAM(s) IntraMuscular once  guaiFENesin  milliGRAM(s) Oral every 12 hours  insulin glargine Injectable (LANTUS) 22 Unit(s) SubCutaneous at bedtime  insulin lispro (ADMELOG) corrective regimen sliding scale   SubCutaneous three times a day before meals  insulin lispro (ADMELOG) corrective regimen sliding scale   SubCutaneous at bedtime  insulin lispro Injectable (ADMELOG) 8 Unit(s) SubCutaneous three times a day before meals  ketotifen 0.025% Ophthalmic Solution 1 Drop(s) Both EYES two times a day  losartan 50 milliGRAM(s) Oral daily  meropenem  IVPB 1000 milliGRAM(s) IV Intermittent every 8 hours  metoprolol succinate ER 50 milliGRAM(s) Oral daily  mirtazapine 7.5 milliGRAM(s) Oral at bedtime  montelukast 10 milliGRAM(s) Oral daily  pantoprazole    Tablet 40 milliGRAM(s) Oral before breakfast  potassium chloride   Powder 40 milliEquivalent(s) Oral once  remdesivir  IVPB 100 milliGRAM(s) IV Intermittent every 24 hours  remdesivir  IVPB   IV Intermittent   senna 2 Tablet(s) Oral at bedtime  tiotropium 18 MICROgram(s) Capsule 1 Capsule(s) Inhalation daily      PRN Meds:  acetaminophen     Tablet .. 650 milliGRAM(s) Oral every 6 hours PRN  ALBUTerol    90 MICROgram(s) HFA Inhaler 2 Puff(s) Inhalation every 6 hours PRN      LABS:                        11.7   5.72  )-----------( 131      ( 24 Jan 2022 06:38 )             38.3     Hgb Trend: 11.7<--, 12.0<--, 11.6<--, 15.4<--  01-24    145  |  107  |  15  ----------------------------<  72  3.7   |  30  |  0.35<L>    Ca    8.9      24 Jan 2022 06:38  Phos  2.5     01-24  Mg     1.80     01-24    TPro  5.7<L>  /  Alb  2.6<L>  /  TBili  <0.2  /  DBili  x   /  AST  23  /  ALT  32  /  AlkPhos  141<H>  01-24    Creatinine Trend: 0.35<--, 0.43<--, 0.37<--, 0.37<--            MICROBIOLOGY:     Culture - Blood (collected 21 Jan 2022 21:13)  Source: .Blood Blood-Peripheral  Preliminary Report (22 Jan 2022 22:01):    No growth to date.    Culture - Blood (collected 21 Jan 2022 21:13)  Source: .Blood Blood-Peripheral  Preliminary Report (22 Jan 2022 22:01):    No growth to date.    Culture - Sputum, Cystic Fibrosis (collected 21 Jan 2022 16:52)  Source: .Sputum  Gram Stain (21 Jan 2022 22:05):    Numerous polymorphonuclear leukocytes per low power field    No squamous epithelial cells per low power field    Numerous Gram Negative Rods per oil power field  Preliminary Report (23 Jan 2022 22:14):    Few Pseudomonas aeruginosa    Few Klebsiella pneumoniae    Rare Elizabethkingia meningoseptica    Few Mixed upper respiratory prashanth    Culture - Urine (collected 21 Jan 2022 14:18)  Source: Clean Catch Clean Catch (Midstream)  Final Report (22 Jan 2022 12:37):    No growth        RADIOLOGY:  [ ] Reviewed and interpreted by me    PULMONARY FUNCTION TESTS:    EKG: SUBJECTIVE/OVERNIGHT EVENTS:  No overnight events  Complains of severe coughing fits and sputum production (brownish yellow in color)  Also complaining of diarrhea and headache  Denies f/c, SOB at rest, chest pain, pleuritic pain, hemoptysis    14 point ROS negative except as noted above      OBJECTIVE:  ICU Vital Signs Last 24 Hrs  T(C): 36.6 (24 Jan 2022 07:01), Max: 36.6 (23 Jan 2022 10:12)  T(F): 97.8 (24 Jan 2022 07:01), Max: 97.9 (23 Jan 2022 10:12)  HR: 73 (24 Jan 2022 07:01) (71 - 83)  BP: 137/62 (24 Jan 2022 07:01) (115/54 - 137/62)  BP(mean): --  ABP: --  ABP(mean): --  RR: 18 (24 Jan 2022 07:01) (18 - 18)  SpO2: 100% (24 Jan 2022 07:01) (95% - 100%)    Mode: standby    01-23 @ 07:01  -  01-24 @ 07:00  --------------------------------------------------------  IN: 1000 mL / OUT: 0 mL / NET: 1000 mL      CAPILLARY BLOOD GLUCOSE      POCT Blood Glucose.: 245 mg/dL (23 Jan 2022 22:04)      PHYSICAL EXAM:  General: awake and alert, frail and cachectic appearing female lying in bed  HEENT: NC/AT, EOMI b/l, conjunctiva normal, MMM  Lymph Nodes: no cervical LAD  Neck: supple. full range of motion  Respiratory: rhonchorous breath sounds b/l, appears comfortable on NC, no conversational dyspnea or accessory muscle use  Cardiovascular: S1 S2 present, RRR, no m/r/g  Abdomen: soft, NT/ND, +BS  Extremities: no c/c/e  Skin: no rashes or lesions noted  Neurological: AAOx3, no focal deficits  Psychiatry: calm, cooperative    LINES:     HOSPITAL MEDICATIONS:  Standing Meds:  bisacodyl 5 milliGRAM(s) Oral every 12 hours  budesonide 160 MICROgram(s)/formoterol 4.5 MICROgram(s) Inhaler 2 Puff(s) Inhalation two times a day  calcium carbonate 1250 mG  + Vitamin D (OsCal 500 + D) 1 Tablet(s) Oral two times a day  dexAMETHasone  Injectable 6 milliGRAM(s) IV Push daily  dextrose 40% Gel 15 Gram(s) Oral once  dextrose 5%. 1000 milliLiter(s) IV Continuous <Continuous>  dextrose 5%. 1000 milliLiter(s) IV Continuous <Continuous>  dextrose 50% Injectable 25 Gram(s) IV Push once  dextrose 50% Injectable 12.5 Gram(s) IV Push once  dextrose 50% Injectable 25 Gram(s) IV Push once  enoxaparin Injectable 30 milliGRAM(s) SubCutaneous daily  famotidine    Tablet 40 milliGRAM(s) Oral at bedtime  ferrous    sulfate 325 milliGRAM(s) Oral daily  fluticasone propionate 50 MICROgram(s)/spray Nasal Spray 1 Spray(s) Both Nostrils two times a day  glucagon  Injectable 1 milliGRAM(s) IntraMuscular once  guaiFENesin  milliGRAM(s) Oral every 12 hours  insulin glargine Injectable (LANTUS) 22 Unit(s) SubCutaneous at bedtime  insulin lispro (ADMELOG) corrective regimen sliding scale   SubCutaneous three times a day before meals  insulin lispro (ADMELOG) corrective regimen sliding scale   SubCutaneous at bedtime  insulin lispro Injectable (ADMELOG) 8 Unit(s) SubCutaneous three times a day before meals  ketotifen 0.025% Ophthalmic Solution 1 Drop(s) Both EYES two times a day  losartan 50 milliGRAM(s) Oral daily  meropenem  IVPB 1000 milliGRAM(s) IV Intermittent every 8 hours  metoprolol succinate ER 50 milliGRAM(s) Oral daily  mirtazapine 7.5 milliGRAM(s) Oral at bedtime  montelukast 10 milliGRAM(s) Oral daily  pantoprazole    Tablet 40 milliGRAM(s) Oral before breakfast  potassium chloride   Powder 40 milliEquivalent(s) Oral once  remdesivir  IVPB 100 milliGRAM(s) IV Intermittent every 24 hours  remdesivir  IVPB   IV Intermittent   senna 2 Tablet(s) Oral at bedtime  tiotropium 18 MICROgram(s) Capsule 1 Capsule(s) Inhalation daily      PRN Meds:  acetaminophen     Tablet .. 650 milliGRAM(s) Oral every 6 hours PRN  ALBUTerol    90 MICROgram(s) HFA Inhaler 2 Puff(s) Inhalation every 6 hours PRN      LABS:                        11.7   5.72  )-----------( 131      ( 24 Jan 2022 06:38 )             38.3     Hgb Trend: 11.7<--, 12.0<--, 11.6<--, 15.4<--  01-24    145  |  107  |  15  ----------------------------<  72  3.7   |  30  |  0.35<L>    Ca    8.9      24 Jan 2022 06:38  Phos  2.5     01-24  Mg     1.80     01-24    TPro  5.7<L>  /  Alb  2.6<L>  /  TBili  <0.2  /  DBili  x   /  AST  23  /  ALT  32  /  AlkPhos  141<H>  01-24    Creatinine Trend: 0.35<--, 0.43<--, 0.37<--, 0.37<--            MICROBIOLOGY:     Culture - Blood (collected 21 Jan 2022 21:13)  Source: .Blood Blood-Peripheral  Preliminary Report (22 Jan 2022 22:01):    No growth to date.    Culture - Blood (collected 21 Jan 2022 21:13)  Source: .Blood Blood-Peripheral  Preliminary Report (22 Jan 2022 22:01):    No growth to date.    Culture - Sputum, Cystic Fibrosis (collected 21 Jan 2022 16:52)  Source: .Sputum  Gram Stain (21 Jan 2022 22:05):    Numerous polymorphonuclear leukocytes per low power field    No squamous epithelial cells per low power field    Numerous Gram Negative Rods per oil power field  Preliminary Report (23 Jan 2022 22:14):    Few Pseudomonas aeruginosa    Few Klebsiella pneumoniae    Rare Elizabethkingia meningoseptica    Few Mixed upper respiratory prashanth    Culture - Urine (collected 21 Jan 2022 14:18)  Source: Clean Catch Clean Catch (Midstream)  Final Report (22 Jan 2022 12:37):    No growth        RADIOLOGY:  [ ] Reviewed and interpreted by me    PULMONARY FUNCTION TESTS:    EKG:

## 2022-01-25 DIAGNOSIS — E78.5 HYPERLIPIDEMIA, UNSPECIFIED: ICD-10-CM

## 2022-01-25 LAB
ALBUMIN SERPL ELPH-MCNC: 2.6 G/DL — LOW (ref 3.3–5)
ALP SERPL-CCNC: 142 U/L — HIGH (ref 40–120)
ALT FLD-CCNC: 31 U/L — SIGNIFICANT CHANGE UP (ref 4–33)
ANION GAP SERPL CALC-SCNC: 7 MMOL/L — SIGNIFICANT CHANGE UP (ref 7–14)
AST SERPL-CCNC: 26 U/L — SIGNIFICANT CHANGE UP (ref 4–32)
BILIRUB SERPL-MCNC: <0.2 MG/DL — SIGNIFICANT CHANGE UP (ref 0.2–1.2)
BUN SERPL-MCNC: 19 MG/DL — SIGNIFICANT CHANGE UP (ref 7–23)
CALCIUM SERPL-MCNC: 8.8 MG/DL — SIGNIFICANT CHANGE UP (ref 8.4–10.5)
CHLORIDE SERPL-SCNC: 103 MMOL/L — SIGNIFICANT CHANGE UP (ref 98–107)
CO2 SERPL-SCNC: 31 MMOL/L — SIGNIFICANT CHANGE UP (ref 22–31)
CREAT SERPL-MCNC: 0.31 MG/DL — LOW (ref 0.5–1.3)
GLUCOSE SERPL-MCNC: 60 MG/DL — LOW (ref 70–99)
HCT VFR BLD CALC: 37.5 % — SIGNIFICANT CHANGE UP (ref 34.5–45)
HGB BLD-MCNC: 11.8 G/DL — SIGNIFICANT CHANGE UP (ref 11.5–15.5)
MAGNESIUM SERPL-MCNC: 1.8 MG/DL — SIGNIFICANT CHANGE UP (ref 1.6–2.6)
MCHC RBC-ENTMCNC: 28.1 PG — SIGNIFICANT CHANGE UP (ref 27–34)
MCHC RBC-ENTMCNC: 31.5 GM/DL — LOW (ref 32–36)
MCV RBC AUTO: 89.3 FL — SIGNIFICANT CHANGE UP (ref 80–100)
NRBC # BLD: 0 /100 WBCS — SIGNIFICANT CHANGE UP
NRBC # FLD: 0 K/UL — SIGNIFICANT CHANGE UP
PHOSPHATE SERPL-MCNC: 2.5 MG/DL — SIGNIFICANT CHANGE UP (ref 2.5–4.5)
PLATELET # BLD AUTO: 143 K/UL — LOW (ref 150–400)
POTASSIUM SERPL-MCNC: 3.4 MMOL/L — LOW (ref 3.5–5.3)
POTASSIUM SERPL-SCNC: 3.4 MMOL/L — LOW (ref 3.5–5.3)
PROT SERPL-MCNC: 5.7 G/DL — LOW (ref 6–8.3)
RBC # BLD: 4.2 M/UL — SIGNIFICANT CHANGE UP (ref 3.8–5.2)
RBC # FLD: 13.2 % — SIGNIFICANT CHANGE UP (ref 10.3–14.5)
SODIUM SERPL-SCNC: 141 MMOL/L — SIGNIFICANT CHANGE UP (ref 135–145)
WBC # BLD: 6.8 K/UL — SIGNIFICANT CHANGE UP (ref 3.8–10.5)
WBC # FLD AUTO: 6.8 K/UL — SIGNIFICANT CHANGE UP (ref 3.8–10.5)

## 2022-01-25 PROCEDURE — 99233 SBSQ HOSP IP/OBS HIGH 50: CPT

## 2022-01-25 PROCEDURE — 99232 SBSQ HOSP IP/OBS MODERATE 35: CPT

## 2022-01-25 PROCEDURE — 99255 IP/OBS CONSLTJ NEW/EST HI 80: CPT

## 2022-01-25 RX ORDER — VENLAFAXINE HCL 75 MG
37.5 CAPSULE, EXT RELEASE 24 HR ORAL DAILY
Refills: 0 | Status: DISCONTINUED | OUTPATIENT
Start: 2022-01-25 | End: 2022-01-31

## 2022-01-25 RX ORDER — BENZOCAINE AND MENTHOL 5; 1 G/100ML; G/100ML
1 LIQUID ORAL EVERY 6 HOURS
Refills: 0 | Status: DISCONTINUED | OUTPATIENT
Start: 2022-01-25 | End: 2022-01-31

## 2022-01-25 RX ORDER — POTASSIUM CHLORIDE 20 MEQ
20 PACKET (EA) ORAL ONCE
Refills: 0 | Status: COMPLETED | OUTPATIENT
Start: 2022-01-25 | End: 2022-01-25

## 2022-01-25 RX ORDER — SIMETHICONE 80 MG/1
80 TABLET, CHEWABLE ORAL EVERY 8 HOURS
Refills: 0 | Status: DISCONTINUED | OUTPATIENT
Start: 2022-01-25 | End: 2022-01-31

## 2022-01-25 RX ORDER — HYDROCORTISONE 1 %
1 OINTMENT (GRAM) TOPICAL
Refills: 0 | Status: DISCONTINUED | OUTPATIENT
Start: 2022-01-25 | End: 2022-01-25

## 2022-01-25 RX ORDER — INSULIN LISPRO 100/ML
6 VIAL (ML) SUBCUTANEOUS
Refills: 0 | Status: DISCONTINUED | OUTPATIENT
Start: 2022-01-25 | End: 2022-01-27

## 2022-01-25 RX ORDER — INSULIN GLARGINE 100 [IU]/ML
8 INJECTION, SOLUTION SUBCUTANEOUS AT BEDTIME
Refills: 0 | Status: DISCONTINUED | OUTPATIENT
Start: 2022-01-25 | End: 2022-01-26

## 2022-01-25 RX ORDER — INSULIN LISPRO 100/ML
VIAL (ML) SUBCUTANEOUS AT BEDTIME
Refills: 0 | Status: DISCONTINUED | OUTPATIENT
Start: 2022-01-25 | End: 2022-01-31

## 2022-01-25 RX ORDER — HYDROCORTISONE 1 %
1 OINTMENT (GRAM) TOPICAL
Refills: 0 | Status: DISCONTINUED | OUTPATIENT
Start: 2022-01-25 | End: 2022-01-31

## 2022-01-25 RX ADMIN — FAMOTIDINE 40 MILLIGRAM(S): 10 INJECTION INTRAVENOUS at 23:07

## 2022-01-25 RX ADMIN — INSULIN GLARGINE 8 UNIT(S): 100 INJECTION, SOLUTION SUBCUTANEOUS at 21:58

## 2022-01-25 RX ADMIN — Medication 50 MILLIGRAM(S): at 06:31

## 2022-01-25 RX ADMIN — Medication 20 MILLIEQUIVALENT(S): at 12:15

## 2022-01-25 RX ADMIN — Medication 1 TABLET(S): at 18:14

## 2022-01-25 RX ADMIN — Medication 600 MILLIGRAM(S): at 06:33

## 2022-01-25 RX ADMIN — Medication 600 MILLIGRAM(S): at 19:46

## 2022-01-25 RX ADMIN — Medication 325 MILLIGRAM(S): at 12:15

## 2022-01-25 RX ADMIN — PANTOPRAZOLE SODIUM 40 MILLIGRAM(S): 20 TABLET, DELAYED RELEASE ORAL at 06:31

## 2022-01-25 RX ADMIN — KETOTIFEN FUMARATE 1 DROP(S): 0.34 SOLUTION OPHTHALMIC at 06:32

## 2022-01-25 RX ADMIN — MIRTAZAPINE 7.5 MILLIGRAM(S): 45 TABLET, ORALLY DISINTEGRATING ORAL at 21:47

## 2022-01-25 RX ADMIN — Medication 6: at 18:12

## 2022-01-25 RX ADMIN — Medication 8: at 13:09

## 2022-01-25 RX ADMIN — Medication 3 UNIT(S): at 13:09

## 2022-01-25 RX ADMIN — ENOXAPARIN SODIUM 30 MILLIGRAM(S): 100 INJECTION SUBCUTANEOUS at 12:15

## 2022-01-25 RX ADMIN — TIOTROPIUM BROMIDE 1 CAPSULE(S): 18 CAPSULE ORAL; RESPIRATORY (INHALATION) at 12:14

## 2022-01-25 RX ADMIN — Medication 1 SPRAY(S): at 18:14

## 2022-01-25 RX ADMIN — Medication 6 UNIT(S): at 18:13

## 2022-01-25 RX ADMIN — Medication 1 SPRAY(S): at 06:32

## 2022-01-25 RX ADMIN — MONTELUKAST 10 MILLIGRAM(S): 4 TABLET, CHEWABLE ORAL at 12:14

## 2022-01-25 RX ADMIN — BUDESONIDE AND FORMOTEROL FUMARATE DIHYDRATE 2 PUFF(S): 160; 4.5 AEROSOL RESPIRATORY (INHALATION) at 12:14

## 2022-01-25 RX ADMIN — REMDESIVIR 500 MILLIGRAM(S): 5 INJECTION INTRAVENOUS at 23:06

## 2022-01-25 RX ADMIN — Medication 6 MILLIGRAM(S): at 06:31

## 2022-01-25 RX ADMIN — Medication 37.5 MILLIGRAM(S): at 21:52

## 2022-01-25 RX ADMIN — MEROPENEM 100 MILLIGRAM(S): 1 INJECTION INTRAVENOUS at 06:30

## 2022-01-25 RX ADMIN — BUDESONIDE AND FORMOTEROL FUMARATE DIHYDRATE 2 PUFF(S): 160; 4.5 AEROSOL RESPIRATORY (INHALATION) at 21:46

## 2022-01-25 RX ADMIN — LOSARTAN POTASSIUM 50 MILLIGRAM(S): 100 TABLET, FILM COATED ORAL at 06:31

## 2022-01-25 RX ADMIN — KETOTIFEN FUMARATE 1 DROP(S): 0.34 SOLUTION OPHTHALMIC at 18:13

## 2022-01-25 RX ADMIN — MEROPENEM 100 MILLIGRAM(S): 1 INJECTION INTRAVENOUS at 21:46

## 2022-01-25 RX ADMIN — SIMETHICONE 80 MILLIGRAM(S): 80 TABLET, CHEWABLE ORAL at 15:57

## 2022-01-25 RX ADMIN — MEROPENEM 100 MILLIGRAM(S): 1 INJECTION INTRAVENOUS at 14:47

## 2022-01-25 RX ADMIN — Medication 1 TABLET(S): at 06:34

## 2022-01-25 NOTE — PROGRESS NOTE ADULT - SUBJECTIVE AND OBJECTIVE BOX
Follow Up:  COVID, bronchiectasis exacerbation    Interval History: pt afebrile, normal WBC and on baseline 2 L O2    ROS:      All other systems negative    Constitutional: no fever, no chills  Cardiovascular:  no chest pain, no palpitation  Respiratory:  no SOB, + cough  GI:  no abd pain, no vomiting, no diarrhea  urinary: no dysuria, no hematuria, no flank pain  musculoskeletal:  no joint pain, no joint swelling  skin:  no rash  neurology:  no headache, no seizure    Allergies  No Known Allergies        ANTIMICROBIALS:  levoFLOXacin  Tablet 500 every 24 hours  meropenem  IVPB 1000 every 8 hours  remdesivir  IVPB 100 every 24 hours  remdesivir  IVPB        OTHER MEDS:  acetaminophen     Tablet .. 650 milliGRAM(s) Oral every 6 hours PRN  ALBUTerol    90 MICROgram(s) HFA Inhaler 2 Puff(s) Inhalation every 6 hours PRN  budesonide 160 MICROgram(s)/formoterol 4.5 MICROgram(s) Inhaler 2 Puff(s) Inhalation two times a day  calcium carbonate 1250 mG  + Vitamin D (OsCal 500 + D) 1 Tablet(s) Oral two times a day  dexAMETHasone  Injectable 6 milliGRAM(s) IV Push daily  dextrose 40% Gel 15 Gram(s) Oral once  dextrose 5%. 1000 milliLiter(s) IV Continuous <Continuous>  dextrose 5%. 1000 milliLiter(s) IV Continuous <Continuous>  dextrose 50% Injectable 25 Gram(s) IV Push once  dextrose 50% Injectable 12.5 Gram(s) IV Push once  dextrose 50% Injectable 25 Gram(s) IV Push once  enoxaparin Injectable 30 milliGRAM(s) SubCutaneous daily  famotidine    Tablet 40 milliGRAM(s) Oral at bedtime  ferrous    sulfate 325 milliGRAM(s) Oral daily  fluticasone propionate 50 MICROgram(s)/spray Nasal Spray 1 Spray(s) Both Nostrils two times a day  glucagon  Injectable 1 milliGRAM(s) IntraMuscular once  guaiFENesin  milliGRAM(s) Oral every 12 hours  insulin glargine Injectable (LANTUS) 14 Unit(s) SubCutaneous at bedtime  insulin lispro (ADMELOG) corrective regimen sliding scale   SubCutaneous at bedtime  insulin lispro (ADMELOG) corrective regimen sliding scale   SubCutaneous three times a day before meals  insulin lispro Injectable (ADMELOG) 3 Unit(s) SubCutaneous three times a day before meals  ketotifen 0.025% Ophthalmic Solution 1 Drop(s) Both EYES two times a day  losartan 50 milliGRAM(s) Oral daily  metoprolol succinate ER 50 milliGRAM(s) Oral daily  mirtazapine 7.5 milliGRAM(s) Oral at bedtime  montelukast 10 milliGRAM(s) Oral daily  pantoprazole    Tablet 40 milliGRAM(s) Oral before breakfast  potassium chloride    Tablet ER 20 milliEquivalent(s) Oral once  tiotropium 18 MICROgram(s) Capsule 1 Capsule(s) Inhalation daily      Vital Signs Last 24 Hrs  T(C): 36.7 (25 Jan 2022 06:00), Max: 37.1 (24 Jan 2022 15:24)  T(F): 98.1 (25 Jan 2022 06:00), Max: 98.7 (24 Jan 2022 15:24)  HR: 74 (25 Jan 2022 10:40) (65 - 80)  BP: 122/65 (25 Jan 2022 06:00) (121/62 - 124/73)  BP(mean): --  RR: 19 (25 Jan 2022 06:00) (18 - 19)  SpO2: 96% (25 Jan 2022 10:40) (95% - 100%)    Physical Exam:  General:    NAD, non toxic  Cardio:    regular S1,S2  Respiratory:  comfortable on NC  abd:   soft, BS +, not tender  :     no CVAT, no suprapubic tenderness, no francisco  Musculoskeletal : no joint swelling, no edema  Skin:    no rash  vascular: no phlebitis                          11.8   6.80  )-----------( 143      ( 25 Jan 2022 07:49 )             37.5       01-25    141  |  103  |  19  ----------------------------<  60<L>  3.4<L>   |  31  |  0.31<L>    Ca    8.8      25 Jan 2022 07:49  Phos  2.5     01-25  Mg     1.80     01-25    TPro  5.7<L>  /  Alb  2.6<L>  /  TBili  <0.2  /  DBili  x   /  AST  26  /  ALT  31  /  AlkPhos  142<H>  01-25          MICROBIOLOGY:  v  .Blood Blood-Peripheral  01-21-22   No growth to date.  --  --      .Sputum  01-21-22   Few Pseudomonas aeruginosa  Few Klebsiella pneumoniae ESBL  Rare Elizabethkingia meningoseptica  Few Mixed upper respiratory prashanth  --  Pseudomonas aeruginosa  Klebsiella pneumoniae ESBL  Elizabethkingia meningoseptica      Clean Catch Clean Catch (Midstream)  01-21-22   No growth  --  --          Rapid RVP Result: Detected (01-21 @ 15:08)        RADIOLOGY:  Images independently visualized and reviewed personally, findings as below  < from: Xray Chest 1 View- PORTABLE-Urgent (01.21.22 @ 14:32) >  IMPRESSION:    No radiographic evidence of pneumonia.    < end of copied text >

## 2022-01-25 NOTE — CONSULT NOTE ADULT - ASSESSMENT
67 y/o Leandro speaking Female, with a PmHx of Diffuse Cystic Bronchiectasis and Primary Ciliary Dyskinesia on 2L NC QD and AVAPS QHS, Chronic Sinusitis, ABPA, HTN, GERD, IDDM2, Depression, Nodular Liver and CBD stricture s/p PTC  and admissions for PNA in past, presented to the [facility] ED for SOB  pt is unvaccinated currently being treated for COVID  endocrine called today for DM assistance   currently on dex and remdesvir       1. DM   a1c 9, on home basal bolus, per chart basaglar and [place] (pt does not know names)  currently on steroids (dex for covid, started 1/21- to stop 1/28?)  inpt with am hypoglycemia and than with what appears to be reactive hyperglycemia to hypoglycemia as well as persistent hyperglycemia post prandial in setting of steroids   check FSBG TID qac and hs  carb consistent diet   -lower Lantus 8 u qhs  -Humalog 6-6-6 with meals, half the dose if the pt eats 50% or less of the meal  -moderate dose SS TIDAC/ low dose qhs  -[facility] and when [facility] improves      dc planning: basal bolus based on inpt trend         2. HLD  can check fasting lipid panel with next set of labs  consider statin given DM as CV risk factor if no contraindications         3. HTN  BP management as per primary team in the setting of COVID          plan of care emailed to LION Pfeiffer MD  Attending Physician   Department of Endocrinology, Diabetes and Metabolism     Pager  [phone] [please provide 10 digit call back number]  [facility] 9-5  [email or web address]  Nights and weekends: [phone]  Please note that this patient may be followed by a different provider tomorrow.   If no answer or after hours, please contact [phone].  For final dc reccomendations, please call [phone] or page the endocrine fellow on call.

## 2022-01-25 NOTE — PROGRESS NOTE ADULT - ASSESSMENT
65 yo F w/ diffuse cystic bronchiectasis and primary ciliary dyskinesia, on chronic home O2 and AVAPS qhs, ABPA, HTN, GERD, DM2 on long term insulin therapy, depression, liver cirrhosis and CBD stricture, recent admission for  pseudomonas PNA, admitted w/ acute on chronic hypoxic/hypercapnic respiratory failure 2/2 COVID19/bronchiectasis flare and concern for superimposed bacterial pneumonia.     # Acute on chronic hypoxic/hypercapnic respiratory failure, COVID19 infection, bronchiectasis, potential superimposed pneumonia  - pulm and ID input appreciated  - c/w duonebs, ATC 3% saline, aerobika device  - c/w supplemental O2, AVAPS qhs, monitor respiratory status closely  - symptomatic management of cough  - c/w IV meropenem as per ID  - ID recs appreciated, sputum cx polymicrobial w/ pseudomonas, klebsiella, elizabethkingia, sensitivities noted, elizabethkingia resistant to meropenem, adding on levofloxain to regimen, per ID on discharge likely levaquin total of 2 weeks  - c/w remdesivir for 5 day course  - c/w dexamethasone for 10 day course  - c/w montelukast, Spiriva, Symbicort Flonase    # DM2 c/b steroid induced hyperglycemia and also by episodes of hypoglycemia  - Lantus dose was reduced back to 14U yesterday 2/2 hypoglycemia, with additional episode of hypoglycemia this AM in setting of poor PO intake, but also w/ episodes of steroid induced hyperglycemia, FS >300s  - continue to monitor FS and adjust basal/bolus regimen as needed, monitor FS, continue w/ sliding scale coverage  - A1C 9.4  - endocrine assistance appreciated, will f/u recs    # Loose BM  - reports loose bowel movements  - hold bowel regimen, reassess   - monitor stool counts, if persistent, watery, check stool studies, GI PCR, c.diff    # Essential HTN  - c/w losartan, toprol xl    # GERD  - c/w protonix, pepcid    # Depression  c/w remeron, likely resume home effexor, meds from other sources indicate was prescribed effexor 37.5 mg ER, last filled in December VTE ppx: lovenox    Dispo: pending improvement in respiratory status, antibiotic course

## 2022-01-25 NOTE — PROGRESS NOTE ADULT - ASSESSMENT
Patient is a a 67 yo F w/ Bronchiectasis, chronic hypercapnic/hypoxemic respiratory failure (2 to 5L O2/IVAPS - RR 18, EPAP 5, PS 15-25, average TV is 443), Liver Cirrohsis with prior CBD stricture s/p dilation and HCV ab+ who presents with fevers, productive sputum and wheezing, found to be COVID positive.     #Acute on Chronic hypoxemic/hypercapnic respiratory failure   - Acute bronchiectasis flare 2/2 bacterial process superimposed by COVID infection  - Self medicated w/ Augmentin and Prednisone 40mg daily for 3 days   - CXR 1/21 showed diffuse bilateral coarse reticular markings grossly unchanged from prior CXR  - C/w Duonebs q6h and ATC/3% Saline  - aerobika prn for airway clearance, please have at bedside  - C/w Decadron 6mg PO daily x7 days as patient already took Pred 40mg x 3 days to end 1/28  - C/w Remdesivir  - s/p azithromycin, continue meropenem for Pseudomonas and Klebsiellla in the sputum  - both Pseudomonas and Klebsiella are covered by meropenem based on sensitivities  - ID consulted and Elizabethkingia in sputum deemed to be colonization rec 7 days total of meropenem  - c/w supplemental O2 to keep SO2 > 88%, now on home 2L NC  - c/w NIPPV QHS, can use AVAPS - RR 18, EPAP 5, Min IP 15, Max IP 25,   - C/w Mirtazapine and please restart patients venlafaxine as she is complaining of severe anxiety    Trevor Augustin PGY-5  Pulmonary/Critical Care Fellow  Pager: [phone] ([facility]), [phone] (NS)  Pulmonary Spectra #[id number] (NS) / [phone] (SEEMA)     Patient is a a 65 yo F w/ Bronchiectasis, chronic hypercapnic/hypoxemic respiratory failure (2 to 5L O2/IVAPS - RR 18, EPAP 5, PS 15-25, average TV is 443), Liver Cirrohsis with prior CBD stricture s/p dilation and HCV ab+ who presents with fevers, productive sputum and wheezing, found to be COVID positive.     #Acute on Chronic hypoxemic/hypercapnic respiratory failure   - Acute bronchiectasis flare 2/2 bacterial process superimposed by COVID infection  - Self medicated w/ Augmentin and Prednisone 40mg daily for 3 days   - CXR 1/21 showed diffuse bilateral coarse reticular markings grossly unchanged from prior CXR  - C/w Duonebs q6h and ATC/3% Saline  - C/w aerobika prn for airway clearance  - C/w Decadron 6mg PO daily x7 days as patient already took Pred 40mg x 3 days to end 1/28  - C/w Remdesivir  - s/p azithromycin, continue meropenem for Pseudomonas and Klebsiellla in the sputum  - both Pseudomonas and Klebsiella are covered by meropenem based on sensitivities  - ID consulted started Cipro for Elizabethkingia in sputum as it was resistant to Angelica  - c/w supplemental O2 to keep SO2 > 88%, now on home 2L NC  - c/w NIPPV QHS, can use AVAPS - RR 18, EPAP 5, Min IP 15, Max IP 25,   - C/w Mirtazapine and Venlafaxine  - Please order magic mouthwash/benzocaine spray or lozenges as pt it reporting sore throat  - Please order simethicone for bloating pain  - Please order hydrocortisone cream for pt's hemorrhoids    Case discussed with Dr. Miguel Angel Augustin PGY-5  Pulmonary/Critical Care Fellow  Pager: [phone] ([facility]), [phone] (NS)  Pulmonary Spectra #[id number] (NS) / [phone] ([facility]EMA)

## 2022-01-25 NOTE — PROGRESS NOTE ADULT - SUBJECTIVE AND OBJECTIVE BOX
CHIEF COMPLAINT:    Interval Events:    REVIEW OF SYSTEMS:  Constitutional: [ ] negative [ ] fevers [ ] chills [ ] weight loss [ ] weight gain  HEENT: [ ] negative [ ] dry eyes [ ] eye irritation [ ] postnasal drip [ ] nasal congestion  CV: [ ] negative  [ ] chest pain [ ] orthopnea [ ] palpitations [ ] murmur  Resp: [ ] negative [ ] cough [ ] shortness of breath [ ] dyspnea [ ] wheezing [ ] sputum [ ] hemoptysis  GI: [ ] negative [ ] nausea [ ] vomiting [ ] diarrhea [ ] constipation [ ] abd pain [ ] dysphagia   : [ ] negative [ ] dysuria [ ] nocturia [ ] hematuria [ ] increased urinary frequency  Musculoskeletal: [ ] negative [ ] back pain [ ] myalgias [ ] arthralgias [ ] fracture  Skin: [ ] negative [ ] rash [ ] itch  Neurological: [ ] negative [ ] headache [ ] dizziness [ ] syncope [ ] weakness [ ] numbness  Psychiatric: [ ] negative [ ] anxiety [ ] depression  Endocrine: [ ] negative [ ] diabetes [ ] thyroid problem  Hematologic/Lymphatic: [ ] negative [ ] anemia [ ] bleeding problem  Allergic/Immunologic: [ ] negative [ ] itchy eyes [ ] nasal discharge [ ] hives [ ] angioedema  [ ] All other systems negative  [ ] Unable to assess ROS because ________    OBJECTIVE:  ICU Vital Signs Last 24 Hrs  T(C): 36.7 (25 Jan 2022 06:00), Max: 37.1 (24 Jan 2022 15:24)  T(F): 98.1 (25 Jan 2022 06:00), Max: 98.7 (24 Jan 2022 15:24)  HR: 77 (25 Jan 2022 07:51) (65 - 80)  BP: 122/65 (25 Jan 2022 06:00) (121/62 - 124/73)  BP(mean): --  ABP: --  ABP(mean): --  RR: 19 (25 Jan 2022 06:00) (18 - 19)  SpO2: 97% (25 Jan 2022 07:51) (95% - 100%)    Mode: NIV (Noninvasive Ventilation), RR (machine): 18, TV (machine): 450, FiO2: 50, PEEP: 5, P-High: 25, P-Low: 15, PIP: 12    CAPILLARY BLOOD GLUCOSE      POCT Blood Glucose.: 318 mg/dL (24 Jan 2022 22:09)      PHYSICAL EXAM:  General:   HEENT:   Lymph Nodes:  Neck:   Respiratory:   Cardiovascular:   Abdomen:   Extremities:   Skin:   Neurological:  Psychiatry:    HOSPITAL MEDICATIONS:  MEDICATIONS  (STANDING):  budesonide 160 MICROgram(s)/formoterol 4.5 MICROgram(s) Inhaler 2 Puff(s) Inhalation two times a day  calcium carbonate 1250 mG  + Vitamin D (OsCal 500 + D) 1 Tablet(s) Oral two times a day  dexAMETHasone  Injectable 6 milliGRAM(s) IV Push daily  dextrose 40% Gel 15 Gram(s) Oral once  dextrose 5%. 1000 milliLiter(s) (50 mL/Hr) IV Continuous <Continuous>  dextrose 5%. 1000 milliLiter(s) (100 mL/Hr) IV Continuous <Continuous>  dextrose 50% Injectable 25 Gram(s) IV Push once  dextrose 50% Injectable 12.5 Gram(s) IV Push once  dextrose 50% Injectable 25 Gram(s) IV Push once  enoxaparin Injectable 30 milliGRAM(s) SubCutaneous daily  famotidine    Tablet 40 milliGRAM(s) Oral at bedtime  ferrous    sulfate 325 milliGRAM(s) Oral daily  fluticasone propionate 50 MICROgram(s)/spray Nasal Spray 1 Spray(s) Both Nostrils two times a day  glucagon  Injectable 1 milliGRAM(s) IntraMuscular once  guaiFENesin  milliGRAM(s) Oral every 12 hours  insulin glargine Injectable (LANTUS) 14 Unit(s) SubCutaneous at bedtime  insulin lispro (ADMELOG) corrective regimen sliding scale   SubCutaneous at bedtime  insulin lispro (ADMELOG) corrective regimen sliding scale   SubCutaneous three times a day before meals  insulin lispro Injectable (ADMELOG) 3 Unit(s) SubCutaneous three times a day before meals  ketotifen 0.025% Ophthalmic Solution 1 Drop(s) Both EYES two times a day  losartan 50 milliGRAM(s) Oral daily  meropenem  IVPB 1000 milliGRAM(s) IV Intermittent every 8 hours  metoprolol succinate ER 50 milliGRAM(s) Oral daily  mirtazapine 7.5 milliGRAM(s) Oral at bedtime  montelukast 10 milliGRAM(s) Oral daily  pantoprazole    Tablet 40 milliGRAM(s) Oral before breakfast  remdesivir  IVPB 100 milliGRAM(s) IV Intermittent every 24 hours  remdesivir  IVPB   IV Intermittent   tiotropium 18 MICROgram(s) Capsule 1 Capsule(s) Inhalation daily    MEDICATIONS  (PRN):  acetaminophen     Tablet .. 650 milliGRAM(s) Oral every 6 hours PRN Temp greater or equal to 38C (100.4F), Mild Pain (1 - 3), Moderate Pain (4 - 6)  ALBUTerol    90 MICROgram(s) HFA Inhaler 2 Puff(s) Inhalation every 6 hours PRN Shortness of Breath and/or Wheezing      LABS:                        11.8   6.80  )-----------( 143      ( 25 Jan 2022 07:49 )             37.5     Hgb Trend: 11.8<--, 11.7<--, 12.0<--, 11.6<--, 15.4<--  01-24    145  |  107  |  15  ----------------------------<  72  3.7   |  30  |  0.35<L>    Ca    8.9      24 Jan 2022 06:38  Phos  2.5     01-24  Mg     1.80     01-24    TPro  5.7<L>  /  Alb  2.6<L>  /  TBili  <0.2  /  DBili  x   /  AST  23  /  ALT  32  /  AlkPhos  141<H>  01-24    Creatinine Trend: 0.35<--, 0.43<--, 0.37<--, 0.37<--            MICROBIOLOGY:       RADIOLOGY:  [ ] Reviewed and interpreted by me    PULMONARY FUNCTION TESTS:    EKG: CHIEF COMPLAINT: SOB    Interval Events: Pt reports sore throat this morning    REVIEW OF SYSTEMS:  Constitutional: [ ] negative [ ] fevers [ ] chills [ ] weight loss [ ] weight gain  HEENT: [ ] negative [ ] dry eyes [ ] eye irritation [x ] sore throat [ ] nasal congestion  CV: [ ] negative  [ ] chest pain [ ] orthopnea [ ] palpitations [ ] murmur  Resp: [ ] negative [x ] cough [improved ] shortness of breath [ ] dyspnea [ ] wheezing [ ] sputum [ ] hemoptysis  GI: [ ] negative [ ] nausea [ ] vomiting [ ] diarrhea [ ] constipation [ ] abd pain [ ] dysphagia   : [ ] negative [ ] dysuria [ ] nocturia [ ] hematuria [ ] increased urinary frequency  Musculoskeletal: [ ] negative [ ] back pain [ ] myalgias [ ] arthralgias [ ] fracture  Skin: [ ] negative [ ] rash [ ] itch  Neurological: [ ] negative [ ] headache [ ] dizziness [ ] syncope [ ] weakness [ ] numbness  Psychiatric: [ ] negative [ ] anxiety [ ] depression  Endocrine: [ ] negative [ ] diabetes [ ] thyroid problem  Hematologic/Lymphatic: [ ] negative [ ] anemia [ ] bleeding problem  Allergic/Immunologic: [ ] negative [ ] itchy eyes [ ] nasal discharge [ ] hives [ ] angioedema  [x ] All other systems negative  [ ] Unable to assess ROS because ________    OBJECTIVE:  ICU Vital Signs Last 24 Hrs  T(C): 36.7 (25 Jan 2022 06:00), Max: 37.1 (24 Jan 2022 15:24)  T(F): 98.1 (25 Jan 2022 06:00), Max: 98.7 (24 Jan 2022 15:24)  HR: 77 (25 Jan 2022 07:51) (65 - 80)  BP: 122/65 (25 Jan 2022 06:00) (121/62 - 124/73)  BP(mean): --  ABP: --  ABP(mean): --  RR: 19 (25 Jan 2022 06:00) (18 - 19)  SpO2: 97% (25 Jan 2022 07:51) (95% - 100%)    Mode: NIV (Noninvasive Ventilation), RR (machine): 18, TV (machine): 450, FiO2: 50, PEEP: 5, P-High: 25, P-Low: 15, PIP: 12    CAPILLARY BLOOD GLUCOSE      POCT Blood Glucose.: 318 mg/dL (24 Jan 2022 22:09)      PHYSICAL EXAM:  General: Thin female laying in bed in no acute distress  HEENT: Nasal canula in place  Respiratory: Basilar crackles  Cardiovascular: Regular rate and rhythm  Abdomen: Nontender nondistended  Extremities: No peripheral edema  Skin: No rashes noted  Neurological: Alert and oriented  Psychiatry: Appropriate mood and affect    HOSPITAL MEDICATIONS:  MEDICATIONS  (STANDING):  budesonide 160 MICROgram(s)/formoterol 4.5 MICROgram(s) Inhaler 2 Puff(s) Inhalation two times a day  calcium carbonate 1250 mG  + Vitamin D (OsCal 500 + D) 1 Tablet(s) Oral two times a day  dexAMETHasone  Injectable 6 milliGRAM(s) IV Push daily  dextrose 40% Gel 15 Gram(s) Oral once  dextrose 5%. 1000 milliLiter(s) (50 mL/Hr) IV Continuous <Continuous>  dextrose 5%. 1000 milliLiter(s) (100 mL/Hr) IV Continuous <Continuous>  dextrose 50% Injectable 25 Gram(s) IV Push once  dextrose 50% Injectable 12.5 Gram(s) IV Push once  dextrose 50% Injectable 25 Gram(s) IV Push once  enoxaparin Injectable 30 milliGRAM(s) SubCutaneous daily  famotidine    Tablet 40 milliGRAM(s) Oral at bedtime  ferrous    sulfate 325 milliGRAM(s) Oral daily  fluticasone propionate 50 MICROgram(s)/spray Nasal Spray 1 Spray(s) Both Nostrils two times a day  glucagon  Injectable 1 milliGRAM(s) IntraMuscular once  guaiFENesin  milliGRAM(s) Oral every 12 hours  insulin glargine Injectable (LANTUS) 14 Unit(s) SubCutaneous at bedtime  insulin lispro (ADMELOG) corrective regimen sliding scale   SubCutaneous at bedtime  insulin lispro (ADMELOG) corrective regimen sliding scale   SubCutaneous three times a day before meals  insulin lispro Injectable (ADMELOG) 3 Unit(s) SubCutaneous three times a day before meals  ketotifen 0.025% Ophthalmic Solution 1 Drop(s) Both EYES two times a day  losartan 50 milliGRAM(s) Oral daily  meropenem  IVPB 1000 milliGRAM(s) IV Intermittent every 8 hours  metoprolol succinate ER 50 milliGRAM(s) Oral daily  mirtazapine 7.5 milliGRAM(s) Oral at bedtime  montelukast 10 milliGRAM(s) Oral daily  pantoprazole    Tablet 40 milliGRAM(s) Oral before breakfast  remdesivir  IVPB 100 milliGRAM(s) IV Intermittent every 24 hours  remdesivir  IVPB   IV Intermittent   tiotropium 18 MICROgram(s) Capsule 1 Capsule(s) Inhalation daily    MEDICATIONS  (PRN):  acetaminophen     Tablet .. 650 milliGRAM(s) Oral every 6 hours PRN Temp greater or equal to 38C (100.4F), Mild Pain (1 - 3), Moderate Pain (4 - 6)  ALBUTerol    90 MICROgram(s) HFA Inhaler 2 Puff(s) Inhalation every 6 hours PRN Shortness of Breath and/or Wheezing      LABS:                        11.8   6.80  )-----------( 143      ( 25 Jan 2022 07:49 )             37.5     Hgb Trend: 11.8<--, 11.7<--, 12.0<--, 11.6<--, 15.4<--  01-24    145  |  107  |  15  ----------------------------<  72  3.7   |  30  |  0.35<L>    Ca    8.9      24 Jan 2022 06:38  Phos  2.5     01-24  Mg     1.80     01-24    TPro  5.7<L>  /  Alb  2.6<L>  /  TBili  <0.2  /  DBili  x   /  AST  23  /  ALT  32  /  AlkPhos  141<H>  01-24    Creatinine Trend: 0.35<--, 0.43<--, 0.37<--, 0.37<--            MICROBIOLOGY:       RADIOLOGY:  [ ] Reviewed and interpreted by me    PULMONARY FUNCTION TESTS:    EKG:

## 2022-01-25 NOTE — PROGRESS NOTE ADULT - SUBJECTIVE AND OBJECTIVE BOX
[facility] Medicine  Pager [phone]    Patient is a 66y old  Female who presents with a chief complaint of SOB (25 Jan 2022 11:27)      INTERVAL HPI/OVERNIGHT EVENTS:    MEDICATIONS  (STANDING):  budesonide 160 MICROgram(s)/formoterol 4.5 MICROgram(s) Inhaler 2 Puff(s) Inhalation two times a day  calcium carbonate 1250 mG  + Vitamin D (OsCal 500 + D) 1 Tablet(s) Oral two times a day  dexAMETHasone  Injectable 6 milliGRAM(s) IV Push daily  dextrose 40% Gel 15 Gram(s) Oral once  dextrose 5%. 1000 milliLiter(s) (50 mL/Hr) IV Continuous <Continuous>  dextrose 5%. 1000 milliLiter(s) (100 mL/Hr) IV Continuous <Continuous>  dextrose 50% Injectable 25 Gram(s) IV Push once  dextrose 50% Injectable 12.5 Gram(s) IV Push once  dextrose 50% Injectable 25 Gram(s) IV Push once  enoxaparin Injectable 30 milliGRAM(s) SubCutaneous daily  famotidine    Tablet 40 milliGRAM(s) Oral at bedtime  ferrous    sulfate 325 milliGRAM(s) Oral daily  fluticasone propionate 50 MICROgram(s)/spray Nasal Spray 1 Spray(s) Both Nostrils two times a day  glucagon  Injectable 1 milliGRAM(s) IntraMuscular once  guaiFENesin  milliGRAM(s) Oral every 12 hours  insulin glargine Injectable (LANTUS) 14 Unit(s) SubCutaneous at bedtime  insulin lispro (ADMELOG) corrective regimen sliding scale   SubCutaneous at bedtime  insulin lispro (ADMELOG) corrective regimen sliding scale   SubCutaneous three times a day before meals  insulin lispro Injectable (ADMELOG) 3 Unit(s) SubCutaneous three times a day before meals  ketotifen 0.025% Ophthalmic Solution 1 Drop(s) Both EYES two times a day  levoFLOXacin  Tablet 500 milliGRAM(s) Oral every 24 hours  losartan 50 milliGRAM(s) Oral daily  meropenem  IVPB 1000 milliGRAM(s) IV Intermittent every 8 hours  metoprolol succinate ER 50 milliGRAM(s) Oral daily  mirtazapine 7.5 milliGRAM(s) Oral at bedtime  montelukast 10 milliGRAM(s) Oral daily  pantoprazole    Tablet 40 milliGRAM(s) Oral before breakfast  remdesivir  IVPB 100 milliGRAM(s) IV Intermittent every 24 hours  remdesivir  IVPB   IV Intermittent   tiotropium 18 MICROgram(s) Capsule 1 Capsule(s) Inhalation daily    MEDICATIONS  (PRN):  acetaminophen     Tablet .. 650 milliGRAM(s) Oral every 6 hours PRN Temp greater or equal to 38C (100.4F), Mild Pain (1 - 3), Moderate Pain (4 - 6)  ALBUTerol    90 MICROgram(s) HFA Inhaler 2 Puff(s) Inhalation every 6 hours PRN Shortness of Breath and/or Wheezing      Allergies    No Known Allergies    Intolerances        REVIEW OF SYSTEMS:  Please see interval HPI:    Vital Signs Last 24 Hrs  T(C): 36.7 (25 Jan 2022 06:00), Max: 37.1 (24 Jan 2022 15:24)  T(F): 98.1 (25 Jan 2022 06:00), Max: 98.7 (24 Jan 2022 15:24)  HR: 74 (25 Jan 2022 10:40) (65 - 80)  BP: 122/65 (25 Jan 2022 06:00) (121/62 - 124/73)  BP(mean): --  RR: 19 (25 Jan 2022 06:00) (18 - 19)  SpO2: 96% (25 Jan 2022 10:40) (95% - 100%)  I&O's Detail    24 Jan 2022 07:01  -  25 Jan 2022 07:00  --------------------------------------------------------  IN:    IV PiggyBack: 350 mL    Oral Fluid: 500 mL  Total IN: 850 mL    OUT:  Total OUT: 0 mL    Total NET: 850 mL            PHYSICAL EXAM:  GENERAL:   HEAD:    EYES:   ENMT:   NECK:   NERVOUS SYSTEM:    CHEST/LUNG:   HEART:   ABDOMEN:   EXTREMITIES:    LYMPH:   SKIN:     LABS:                        11.8   6.80  )-----------( 143      ( 25 Jan 2022 07:49 )             37.5     25 Jan 2022 07:49    141    |  103    |  19     ----------------------------<  60     3.4     |  31     |  0.31     Ca    8.8        25 Jan 2022 07:49  Phos  2.5       25 Jan 2022 07:49  Mg     1.80      25 Jan 2022 07:49    TPro  5.7    /  Alb  2.6    /  TBili  <0.2   /  DBili  x      /  AST  26     /  ALT  31     /  AlkPhos  142    25 Jan 2022 07:49      CAPILLARY BLOOD GLUCOSE      POCT Blood Glucose.: 312 mg/dL (25 Jan 2022 12:14)  POCT Blood Glucose.: 72 mg/dL (25 Jan 2022 09:19)  POCT Blood Glucose.: 69 mg/dL (25 Jan 2022 09:18)  POCT Blood Glucose.: 318 mg/dL (24 Jan 2022 22:09)  POCT Blood Glucose.: 255 mg/dL (24 Jan 2022 18:01)    BLOOD CULTURE  01-21 @ 21:13   No growth to date.  --  --  SPUTUM CULTURE  01-21 @ 16:52   Few Pseudomonas aeruginosa  Few Klebsiella pneumoniae ESBL  Rare Elizabethkingia meningoseptica  Few Mixed upper respiratory prashanth  --  Pseudomonas aeruginosa  01-21 @ 14:18   No growth  --  --    RADIOLOGY & ADDITIONAL TESTS:    Imaging Personally Reviewed:  [ ] YES     Consultant(s) Notes Reviewed:      Care Discussed with Consultants/Other Providers: [facility] Medicine  Pager [phone]    Patient is a 66y old  Female who presents with a chief complaint of SOB (25 Jan 2022 11:27)      INTERVAL HPI/OVERNIGHT EVENTS:  [facility]  provided by LION Doty. Patient reports still w/ cough, somewhat worse than yesterday. With hypoglycemia this AM, but denies symptoms (ie tremors/weakness/lightheadedness). Admits to poor PO intake, would prefer softer foods. Reports loose BMs, will monitor off bowel regimen. Complaining of "tension" ie anxiety/stress. Discussed ID and pulm recs re: antibiotics, patient verbalized understanding.     MEDICATIONS  (STANDING):  budesonide 160 MICROgram(s)/formoterol 4.5 MICROgram(s) Inhaler 2 Puff(s) Inhalation two times a day  calcium carbonate 1250 mG  + Vitamin D (OsCal 500 + D) 1 Tablet(s) Oral two times a day  dexAMETHasone  Injectable 6 milliGRAM(s) IV Push daily  dextrose 40% Gel 15 Gram(s) Oral once  dextrose 5%. 1000 milliLiter(s) (50 mL/Hr) IV Continuous <Continuous>  dextrose 5%. 1000 milliLiter(s) (100 mL/Hr) IV Continuous <Continuous>  dextrose 50% Injectable 25 Gram(s) IV Push once  dextrose 50% Injectable 12.5 Gram(s) IV Push once  dextrose 50% Injectable 25 Gram(s) IV Push once  enoxaparin Injectable 30 milliGRAM(s) SubCutaneous daily  famotidine    Tablet 40 milliGRAM(s) Oral at bedtime  ferrous    sulfate 325 milliGRAM(s) Oral daily  fluticasone propionate 50 MICROgram(s)/spray Nasal Spray 1 Spray(s) Both Nostrils two times a day  glucagon  Injectable 1 milliGRAM(s) IntraMuscular once  guaiFENesin  milliGRAM(s) Oral every 12 hours  insulin glargine Injectable (LANTUS) 14 Unit(s) SubCutaneous at bedtime  insulin lispro (ADMELOG) corrective regimen sliding scale   SubCutaneous at bedtime  insulin lispro (ADMELOG) corrective regimen sliding scale   SubCutaneous three times a day before meals  insulin lispro Injectable (ADMELOG) 3 Unit(s) SubCutaneous three times a day before meals  ketotifen 0.025% Ophthalmic Solution 1 Drop(s) Both EYES two times a day  levoFLOXacin  Tablet 500 milliGRAM(s) Oral every 24 hours  losartan 50 milliGRAM(s) Oral daily  meropenem  IVPB 1000 milliGRAM(s) IV Intermittent every 8 hours  metoprolol succinate ER 50 milliGRAM(s) Oral daily  mirtazapine 7.5 milliGRAM(s) Oral at bedtime  montelukast 10 milliGRAM(s) Oral daily  pantoprazole    Tablet 40 milliGRAM(s) Oral before breakfast  remdesivir  IVPB 100 milliGRAM(s) IV Intermittent every 24 hours  remdesivir  IVPB   IV Intermittent   tiotropium 18 MICROgram(s) Capsule 1 Capsule(s) Inhalation daily    MEDICATIONS  (PRN):  acetaminophen     Tablet .. 650 milliGRAM(s) Oral every 6 hours PRN Temp greater or equal to 38C (100.4F), Mild Pain (1 - 3), Moderate Pain (4 - 6)  ALBUTerol    90 MICROgram(s) HFA Inhaler 2 Puff(s) Inhalation every 6 hours PRN Shortness of Breath and/or Wheezing    Allergies  No Known Allergies    Intolerances    REVIEW OF SYSTEMS:  Please see interval HPI:    Vital Signs Last 24 Hrs  T(C): 36.7 (25 Jan 2022 06:00), Max: 37.1 (24 Jan 2022 15:24)  T(F): 98.1 (25 Jan 2022 06:00), Max: 98.7 (24 Jan 2022 15:24)  HR: 74 (25 Jan 2022 10:40) (65 - 80)  BP: 122/65 (25 Jan 2022 06:00) (121/62 - 124/73)  RR: 19 (25 Jan 2022 06:00) (18 - 19)  SpO2: 96% (25 Jan 2022 10:40) (95% - 100%)  I&O's Detail    24 Jan 2022 07:01  -  25 Jan 2022 07:00  --------------------------------------------------------  IN:    IV PiggyBack: 350 mL    Oral Fluid: 500 mL  Total IN: 850 mL    OUT:  Total OUT: 0 mL    Total NET: 850 mL      PHYSICAL EXAM:  GENERAL: NAD, sitting in bed, +frequent coughing  HEAD:  NC/AT  EYES: EOMI, clear sclera/conjunctiva  ENMT: MMM  NECK: supple  NERVOUS SYSTEM: moving extremities, non-focal   CHEST/LUNG: NC in place, no respiratory distress on supplemental O2, coarse BS. +wheeze  HEART: S1S2 RRR  ABDOMEN: soft, non-tender  EXTREMITIES:  no c/c/e    LABS:                        11.8   6.80  )-----------( 143      ( 25 Jan 2022 07:49 )             37.5     25 Jan 2022 07:49    141    |  103    |  19     ----------------------------<  60     3.4     |  31     |  0.31     Ca    8.8        25 Jan 2022 07:49  Phos  2.5       25 Jan 2022 07:49  Mg     1.80      25 Jan 2022 07:49    TPro  5.7    /  Alb  2.6    /  TBili  <0.2   /  DBili  x      /  AST  26     /  ALT  31     /  AlkPhos  142    25 Jan 2022 07:49      CAPILLARY BLOOD GLUCOSE  POCT Blood Glucose.: 312 mg/dL (25 Jan 2022 12:14)  POCT Blood Glucose.: 72 mg/dL (25 Jan 2022 09:19)  POCT Blood Glucose.: 69 mg/dL (25 Jan 2022 09:18)  POCT Blood Glucose.: 318 mg/dL (24 Jan 2022 22:09)  POCT Blood Glucose.: 255 mg/dL (24 Jan 2022 18:01)    BLOOD CULTURE  01-21 @ 21:13   No growth to date.  --  --  SPUTUM CULTURE  01-21 @ 16:52   Few Pseudomonas aeruginosa  Few Klebsiella pneumoniae ESBL  Rare Elizabethkingia meningoseptica  Few Mixed upper respiratory prashanth  --  Pseudomonas aeruginosa  01-21 @ 14:18   No growth  --  --    RADIOLOGY & ADDITIONAL TESTS:    Imaging Personally Reviewed:  [ ] YES     Consultant(s) Notes Reviewed:  ID, Pulm    Care Discussed with Consultants/Other Providers: LION Doty re: ID recs to add levofloxacin to regimen based on sensitivities, monitor FS, may need to downtitrate insulin regimen further to avoid hypoglycemia, also w/ steroid induced hyperglycemia, ?potential benefit of endocrine assistance? Monitor for further loose BMs following holding bowel regimen, changing consistency diet to see if will help w/ PO intake

## 2022-01-25 NOTE — CONSULT NOTE ADULT - SUBJECTIVE AND OBJECTIVE BOX
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 Young (Pulmonologist) for SOB and cough.   pt is unvaccianted, currently being treated for COVID  endocrine called today for DM assitance   spoke to pt in [place]  pt with hx of DM, on basal bolus insulin at home (does not know the names or doses)  checks FSBG 4 times a day   diet includes rice and roti and vegetables, glucose can be vairable at home   a1c 9.4  unknown micro or macrovascular complications   pt is on dex for treatment of covid  while inpt variable glucose due to overcorrection of hypoglycemia and pt eating BF (when hypoglcyemia occurs) and no insulin coverage like this morning, therefore with reactive hyperglycemia in response to hypoglycemia      PAST MEDICAL & SURGICAL HISTORY:  ABPA (allergic bronchopulmonary aspergillosis)    Bronchiectasis    Hypertension    Vitamin D deficiency    Microcytic anemia    GERD (gastroesophageal reflux disease)    Postnasal drip    Pseudomonas aeruginosa colonization    Allergic rhinitis    Recurrent pneumonia    Type 2 diabetes mellitus, with long-term current use of insulin    Anxiety and depression    History of cholecystectomy    S/P dilatation of esophageal stricture        FAMILY HISTORY:  Family history of diabetes mellitus  father    Family history of allergic rhinitis (Child, Child)  son, daughter    Family history of bronchitis  mother, father        Social History:  lives with family     Outpatient Medications:  · 	Admelog SoloStar 100 units/mL injectable solution: 8 unit(s) injectable 3 times a day (before meals)   · 	Basaglar KwikPen 100 units/mL subcutaneous solution: 15 unit(s) subcutaneous once a day (at bedtime)   · 	AVAPS: AVAPS  	TV: 380  	Fio2: 28%  	Exp pressure 5  	Max pressure 25  	Min pressure 15  	Backup rate 18  · 	amLODIPine 5 mg oral tablet: 1 tab(s) orally once a day  · 	NebuSal: 4 milliliter(s) inhaled 2 times a day  · 	cyanocobalamin 1000 mcg/mL injectable solution: 1000 microgram(s) injectable every 7 days on Wednesday  · 	ferrous gluconate 324 mg (37.5 mg elemental iron) oral tablet: 1 tab(s) orally 2 times a day  · 	montelukast 10 mg oral tablet: 1 tab(s) orally once a day  · 	Flonase 50 mcg/inh nasal spray: 1 spray(s) in each nostril once a day  · 	omeprazole 20 mg oral delayed release capsule: 1 cap(s) orally once a day  · 	Vitamin C 500 mg oral tablet: 1 tab(s) orally once a day  · 	tobramycin 75 mg/mL inhalation solution: 4 milliliter(s) inhaled every 8 weeks  · 	famotidine 40 mg oral tablet: 1 tab(s) orally once a day (at bedtime)  · 	Spiriva 18 mcg inhalation capsule: 1 cap(s) inhaled once a day  · 	Symbicort 160 mcg-4.5 mcg/inh inhalation aerosol: 2 puff(s) inhaled 2 times a day  · 	mirtazapine 7.5 mg oral tablet: 1 tab(s) orally once a day (at bedtime)        MEDICATIONS  (STANDING):  budesonide 160 MICROgram(s)/formoterol 4.5 MICROgram(s) Inhaler 2 Puff(s) Inhalation two times a day  calcium carbonate 1250 mG  + Vitamin D (OsCal 500 + D) 1 Tablet(s) Oral two times a day  dexAMETHasone  Injectable 6 milliGRAM(s) IV Push daily  dextrose 40% Gel 15 Gram(s) Oral once  dextrose 5%. 1000 milliLiter(s) (50 mL/Hr) IV Continuous <Continuous>  dextrose 5%. 1000 milliLiter(s) (100 mL/Hr) IV Continuous <Continuous>  dextrose 50% Injectable 25 Gram(s) IV Push once  dextrose 50% Injectable 12.5 Gram(s) IV Push once  dextrose 50% Injectable 25 Gram(s) IV Push once  enoxaparin Injectable 30 milliGRAM(s) SubCutaneous daily  famotidine    Tablet 40 milliGRAM(s) Oral at bedtime  ferrous    sulfate 325 milliGRAM(s) Oral daily  fluticasone propionate 50 MICROgram(s)/spray Nasal Spray 1 Spray(s) Both Nostrils two times a day  glucagon  Injectable 1 milliGRAM(s) IntraMuscular once  guaiFENesin  milliGRAM(s) Oral every 12 hours  insulin glargine Injectable (LANTUS) 8 Unit(s) SubCutaneous at bedtime  insulin lispro (ADMELOG) corrective regimen sliding scale   SubCutaneous at bedtime  insulin lispro (ADMELOG) corrective regimen sliding scale   SubCutaneous three times a day before meals  insulin lispro Injectable (ADMELOG) 6 Unit(s) SubCutaneous three times a day before meals  ketotifen 0.025% Ophthalmic Solution 1 Drop(s) Both EYES two times a day  levoFLOXacin  Tablet 500 milliGRAM(s) Oral every 24 hours  losartan 50 milliGRAM(s) Oral daily  meropenem  IVPB 1000 milliGRAM(s) IV Intermittent every 8 hours  metoprolol succinate ER 50 milliGRAM(s) Oral daily  mirtazapine 7.5 milliGRAM(s) Oral at bedtime  montelukast 10 milliGRAM(s) Oral daily  pantoprazole    Tablet 40 milliGRAM(s) Oral before breakfast  remdesivir  IVPB 100 milliGRAM(s) IV Intermittent every 24 hours  remdesivir  IVPB   IV Intermittent   tiotropium 18 MICROgram(s) Capsule 1 Capsule(s) Inhalation daily  venlafaxine XR. 37.5 milliGRAM(s) Oral daily    MEDICATIONS  (PRN):  acetaminophen     Tablet .. 650 milliGRAM(s) Oral every 6 hours PRN Temp greater or equal to 38C (100.4F), Mild Pain (1 - 3), Moderate Pain (4 - 6)  ALBUTerol    90 MICROgram(s) HFA Inhaler 2 Puff(s) Inhalation every 6 hours PRN Shortness of Breath and/or Wheezing  benzocaine 15 mG/menthol 3.6 mG Lozenge 1 Lozenge Oral every 6 hours PRN Sore Throat  hydrocortisone 2.5% Rectal Cream 1 Application(s) Rectal two times a day PRN rectal/hemorrhoid pain  simethicone 80 milliGRAM(s) Chew every 8 hours PRN Gas      Allergies    No Known Allergies    Intolerances      Review of Systems:  Constitutional: No fever  Eyes: No blurry vision  Neuro: No tremors  HEENT: No pain  Cardiovascular: No chest pain, palpitations  Respiratory: No SOB, no cough  GI: No nausea, vomiting, abdominal pain  : No dysuria  Skin: no rash  Psych: no depression  Endocrine: no polyuria, polydipsia  Hem/lymph: no swelling  Osteoporosis: no fractures    ALL OTHER SYSTEMS REVIEWED AND NEGATIVE        PHYSICAL EXAM:  VITALS: T(C): 36.7 (01-25-22 @ 15:24)  T(F): 98 (01-25-22 @ 15:24), Max: 98.1 (01-25-22 @ 06:00)  HR: 73 (01-25-22 @ 15:24) (70 - 80)  BP: 119/54 (01-25-22 @ 15:24) (119/54 - 124/73)  RR:  (18 - 20)  SpO2:  (95% - 98%)  Wt(kg): --  GENERAL: NAD, well-groomed, well-developed  EYES: No proptosis, no lid lag, anicteric  RESPIRATORY: + cough, SOB when speaking   CARDIOVASCULAR: Regular rate and rhythm; No murmurs; no peripheral edema  GI: Soft, nontender, non distended, normal bowel sounds  SKIN: Dry, intact, No rashes or lesions  MUSCULOSKELETAL: Full range of motion, normal strength  NEURO: sensation intact, extraocular movements intact,  PSYCH: Alert and oriented x 3, normal affect, normal mood    POCT Blood Glucose.: 312 mg/dL (01-25-22 @ 12:14)  POCT Blood Glucose.: 72 mg/dL (01-25-22 @ 09:19)  POCT Blood Glucose.: 69 mg/dL (01-25-22 @ 09:18)  POCT Blood Glucose.: 318 mg/dL (01-24-22 @ 22:09)  POCT Blood Glucose.: 255 mg/dL (01-24-22 @ 18:01)  POCT Blood Glucose.: 279 mg/dL (01-24-22 @ 13:09)  POCT Blood Glucose.: 198 mg/dL (01-24-22 @ 10:16)  POCT Blood Glucose.: 142 mg/dL (01-24-22 @ 09:21)  POCT Blood Glucose.: 59 mg/dL (01-24-22 @ 08:57)  POCT Blood Glucose.: 54 mg/dL (01-24-22 @ 08:55)  POCT Blood Glucose.: 245 mg/dL (01-23-22 @ 22:04)  POCT Blood Glucose.: 480 mg/dL (01-23-22 @ 17:54)  POCT Blood Glucose.: 491 mg/dL (01-23-22 @ 17:53)  POCT Blood Glucose.: 396 mg/dL (01-23-22 @ 13:25)  POCT Blood Glucose.: 401 mg/dL (01-23-22 @ 12:19)  POCT Blood Glucose.: 115 mg/dL (01-23-22 @ 08:41)  POCT Blood Glucose.: 103 mg/dL (01-22-22 @ 21:34)                            11.8   6.80  )-----------( 143      ( 25 Jan 2022 07:49 )             37.5       01-25    141  |  103  |  19  ----------------------------<  60<L>  3.4<L>   |  31  |  0.31<L>    EGFR if : 139  EGFR if non : 120    Ca    8.8      01-25  Mg     1.80     01-25  Phos  2.5     01-25    TPro  5.7<L>  /  Alb  2.6<L>  /  TBili  <0.2  /  DBili  x   /  AST  26  /  ALT  31  /  AlkPhos  142<H>  01-25      Thyroid Function Tests:  01-21 @ 14:31 TSH 4.32 FreeT4 -- T3 -- Anti TPO -- Anti Thyroglobulin Ab -- TSI --          01-22 Chol 128 Direct LDL -- LDL calculated 61 HDL 57 Trig 50    Radiology:

## 2022-01-25 NOTE — PROGRESS NOTE ADULT - ASSESSMENT
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), previous admissions for  Pseudomonas PNA but last pseudomonas was not CRE, now sent  by pulmonary for fever and cough, all family members had COVID 2-3 weeks ago but she stated to get dyspnea about 2 weeks ago and now worse  here afebrile, WBC: 12  COVID positive, legionella negative  blood cx negative, sputum cx with pseudomonas, klebsiella and elizabethkingia   CXR: no evidence of bacterial pneumonia     cystic bronchiectasis and ciliary dyskinesia  on home O2 now with fever, cough due to COVID (unvaccinated), ?bronchiectasis exacerbation  sputum cx polymicrobial with pseudomonas, klebsiella and elizabethkingia which is more s/o colonization, no consolidation on CXR    * the elizabethkingia is R to geoff, add levo 500 qd  * c/w geoff for now started 1/21, now day 5 but on discharge will only do levofloxacin (sensitive to all organisms), likely total 2 weeks  * c/w remdesivir to complete a 5 day course  * monitor the renal function and LFTs  * c/w dexa for up to a 10 day course  * monitor the O2 sat    The above assessment and plan was discussed with the primary team     Urmila Rose MD  Pager [phone]  After 5pm and on weekends call [phone]

## 2022-01-26 DIAGNOSIS — R73.9 HYPERGLYCEMIA, UNSPECIFIED: ICD-10-CM

## 2022-01-26 LAB
ALBUMIN SERPL ELPH-MCNC: 2.5 G/DL — LOW (ref 3.3–5)
ALP SERPL-CCNC: 139 U/L — HIGH (ref 40–120)
ALT FLD-CCNC: 29 U/L — SIGNIFICANT CHANGE UP (ref 4–33)
ANION GAP SERPL CALC-SCNC: 3 MMOL/L — LOW (ref 7–14)
AST SERPL-CCNC: 22 U/L — SIGNIFICANT CHANGE UP (ref 4–32)
BILIRUB SERPL-MCNC: <0.2 MG/DL — SIGNIFICANT CHANGE UP (ref 0.2–1.2)
BUN SERPL-MCNC: 18 MG/DL — SIGNIFICANT CHANGE UP (ref 7–23)
CALCIUM SERPL-MCNC: 8.5 MG/DL — SIGNIFICANT CHANGE UP (ref 8.4–10.5)
CHLORIDE SERPL-SCNC: 102 MMOL/L — SIGNIFICANT CHANGE UP (ref 98–107)
CO2 SERPL-SCNC: 33 MMOL/L — HIGH (ref 22–31)
CREAT SERPL-MCNC: 0.31 MG/DL — LOW (ref 0.5–1.3)
CULTURE RESULTS: SIGNIFICANT CHANGE UP
GLUCOSE SERPL-MCNC: 247 MG/DL — HIGH (ref 70–99)
HCT VFR BLD CALC: 38.8 % — SIGNIFICANT CHANGE UP (ref 34.5–45)
HGB BLD-MCNC: 12.1 G/DL — SIGNIFICANT CHANGE UP (ref 11.5–15.5)
MAGNESIUM SERPL-MCNC: 1.8 MG/DL — SIGNIFICANT CHANGE UP (ref 1.6–2.6)
MCHC RBC-ENTMCNC: 27.5 PG — SIGNIFICANT CHANGE UP (ref 27–34)
MCHC RBC-ENTMCNC: 31.2 GM/DL — LOW (ref 32–36)
MCV RBC AUTO: 88.2 FL — SIGNIFICANT CHANGE UP (ref 80–100)
NRBC # BLD: 0 /100 WBCS — SIGNIFICANT CHANGE UP
NRBC # FLD: 0 K/UL — SIGNIFICANT CHANGE UP
ORGANISM # SPEC MICROSCOPIC CNT: SIGNIFICANT CHANGE UP
PHOSPHATE SERPL-MCNC: 2.4 MG/DL — LOW (ref 2.5–4.5)
PLATELET # BLD AUTO: 139 K/UL — LOW (ref 150–400)
POTASSIUM SERPL-MCNC: 3.6 MMOL/L — SIGNIFICANT CHANGE UP (ref 3.5–5.3)
POTASSIUM SERPL-SCNC: 3.6 MMOL/L — SIGNIFICANT CHANGE UP (ref 3.5–5.3)
PROT SERPL-MCNC: 5.8 G/DL — LOW (ref 6–8.3)
RBC # BLD: 4.4 M/UL — SIGNIFICANT CHANGE UP (ref 3.8–5.2)
RBC # FLD: 13.2 % — SIGNIFICANT CHANGE UP (ref 10.3–14.5)
SODIUM SERPL-SCNC: 138 MMOL/L — SIGNIFICANT CHANGE UP (ref 135–145)
SPECIMEN SOURCE: SIGNIFICANT CHANGE UP
WBC # BLD: 7.51 K/UL — SIGNIFICANT CHANGE UP (ref 3.8–10.5)
WBC # FLD AUTO: 7.51 K/UL — SIGNIFICANT CHANGE UP (ref 3.8–10.5)

## 2022-01-26 PROCEDURE — 99233 SBSQ HOSP IP/OBS HIGH 50: CPT

## 2022-01-26 PROCEDURE — 99232 SBSQ HOSP IP/OBS MODERATE 35: CPT

## 2022-01-26 RX ORDER — DEXTROSE 50 % IN WATER 50 %
15 SYRINGE (ML) INTRAVENOUS ONCE
Refills: 0 | Status: COMPLETED | OUTPATIENT
Start: 2022-01-26 | End: 2022-01-26

## 2022-01-26 RX ORDER — NYSTATIN 500MM UNIT
500000 POWDER (EA) MISCELLANEOUS
Refills: 0 | Status: DISCONTINUED | OUTPATIENT
Start: 2022-01-26 | End: 2022-01-31

## 2022-01-26 RX ORDER — POTASSIUM PHOSPHATE, MONOBASIC POTASSIUM PHOSPHATE, DIBASIC 236; 224 MG/ML; MG/ML
15 INJECTION, SOLUTION INTRAVENOUS ONCE
Refills: 0 | Status: COMPLETED | OUTPATIENT
Start: 2022-01-26 | End: 2022-01-26

## 2022-01-26 RX ORDER — INSULIN GLARGINE 100 [IU]/ML
5 INJECTION, SOLUTION SUBCUTANEOUS AT BEDTIME
Refills: 0 | Status: DISCONTINUED | OUTPATIENT
Start: 2022-01-26 | End: 2022-01-27

## 2022-01-26 RX ADMIN — ENOXAPARIN SODIUM 30 MILLIGRAM(S): 100 INJECTION SUBCUTANEOUS at 12:04

## 2022-01-26 RX ADMIN — MEROPENEM 100 MILLIGRAM(S): 1 INJECTION INTRAVENOUS at 22:15

## 2022-01-26 RX ADMIN — KETOTIFEN FUMARATE 1 DROP(S): 0.34 SOLUTION OPHTHALMIC at 17:53

## 2022-01-26 RX ADMIN — MONTELUKAST 10 MILLIGRAM(S): 4 TABLET, CHEWABLE ORAL at 12:03

## 2022-01-26 RX ADMIN — LOSARTAN POTASSIUM 50 MILLIGRAM(S): 100 TABLET, FILM COATED ORAL at 05:47

## 2022-01-26 RX ADMIN — Medication 4: at 13:24

## 2022-01-26 RX ADMIN — Medication 6 UNIT(S): at 13:24

## 2022-01-26 RX ADMIN — Medication 4: at 17:51

## 2022-01-26 RX ADMIN — Medication 500000 UNIT(S): at 17:53

## 2022-01-26 RX ADMIN — Medication 500000 UNIT(S): at 23:24

## 2022-01-26 RX ADMIN — POTASSIUM PHOSPHATE, MONOBASIC POTASSIUM PHOSPHATE, DIBASIC 62.5 MILLIMOLE(S): 236; 224 INJECTION, SOLUTION INTRAVENOUS at 20:11

## 2022-01-26 RX ADMIN — Medication 600 MILLIGRAM(S): at 17:52

## 2022-01-26 RX ADMIN — MEROPENEM 100 MILLIGRAM(S): 1 INJECTION INTRAVENOUS at 05:44

## 2022-01-26 RX ADMIN — Medication 1 TABLET(S): at 17:52

## 2022-01-26 RX ADMIN — TIOTROPIUM BROMIDE 1 CAPSULE(S): 18 CAPSULE ORAL; RESPIRATORY (INHALATION) at 09:41

## 2022-01-26 RX ADMIN — Medication 37.5 MILLIGRAM(S): at 12:03

## 2022-01-26 RX ADMIN — Medication 500000 UNIT(S): at 12:03

## 2022-01-26 RX ADMIN — Medication 1 SPRAY(S): at 05:44

## 2022-01-26 RX ADMIN — Medication 6 UNIT(S): at 17:51

## 2022-01-26 RX ADMIN — INSULIN GLARGINE 5 UNIT(S): 100 INJECTION, SOLUTION SUBCUTANEOUS at 23:24

## 2022-01-26 RX ADMIN — BUDESONIDE AND FORMOTEROL FUMARATE DIHYDRATE 2 PUFF(S): 160; 4.5 AEROSOL RESPIRATORY (INHALATION) at 22:15

## 2022-01-26 RX ADMIN — Medication 50 MILLIGRAM(S): at 05:44

## 2022-01-26 RX ADMIN — PANTOPRAZOLE SODIUM 40 MILLIGRAM(S): 20 TABLET, DELAYED RELEASE ORAL at 07:17

## 2022-01-26 RX ADMIN — Medication 15 GRAM(S): at 03:25

## 2022-01-26 RX ADMIN — Medication 1 SPRAY(S): at 17:52

## 2022-01-26 RX ADMIN — Medication 6 MILLIGRAM(S): at 05:44

## 2022-01-26 RX ADMIN — Medication 6 UNIT(S): at 09:40

## 2022-01-26 RX ADMIN — Medication 325 MILLIGRAM(S): at 12:03

## 2022-01-26 RX ADMIN — BUDESONIDE AND FORMOTEROL FUMARATE DIHYDRATE 2 PUFF(S): 160; 4.5 AEROSOL RESPIRATORY (INHALATION) at 09:41

## 2022-01-26 RX ADMIN — Medication 600 MILLIGRAM(S): at 05:43

## 2022-01-26 RX ADMIN — FAMOTIDINE 40 MILLIGRAM(S): 10 INJECTION INTRAVENOUS at 22:16

## 2022-01-26 RX ADMIN — MEROPENEM 100 MILLIGRAM(S): 1 INJECTION INTRAVENOUS at 14:24

## 2022-01-26 RX ADMIN — MIRTAZAPINE 7.5 MILLIGRAM(S): 45 TABLET, ORALLY DISINTEGRATING ORAL at 22:16

## 2022-01-26 RX ADMIN — Medication 1 TABLET(S): at 05:44

## 2022-01-26 RX ADMIN — KETOTIFEN FUMARATE 1 DROP(S): 0.34 SOLUTION OPHTHALMIC at 05:44

## 2022-01-26 NOTE — PROGRESS NOTE ADULT - ASSESSMENT
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 Endocrine called today for DM assistance, currently on Dexamethasone and Remdesvir     1. DM 2  HbA1c 9%  Home Regimen: Basaglar 14 units qHS, Admelog 5-8 units TID (uses correctional scale per son)  While inpatient, on Dexamethasone 6 mg for COVID until 1/28    While inpatient:   BG target 100-180 mg/dl  Hypoglycemia overnight to 59 mg/dl  Recommend to reduce Lantus to 5 units SQ qHS  Continue Admelog 6 units SQ TID before meals (Hold if NPO/not eating meal)   Continue Admelog moderate dose scale before meals, low dose at bedtime  Check BG before meals and bedtime  Consistent carbohydrate diet  Recommend RD consult, will place order (suggest CS in [place] or [place] if possible)  Hypoglycemia protocol     Discharge Plan:   Resume basal/bolus insulin PENS (Basaglar and Admelog), dose TBD  Ensure she has glucometer, glucose test strips, lancets, alcohol swabs and insulin PEN needles   Followup with PCP  For endocrine followup, can schedule at [facility]: [phone] [facility] [address], [phone]  Will refer to [facility] pharmacist for evaluation     2. Steroid induced hyperglycemia  Dexamethasone 6 mg daily until 1/28, please notify endocrine with any changes in steroid plan     3. HTN  BP management as per primary team in the setting of COVID    4. HLD  LDL 61  Consider statin given DM as CV risk factor if no contraindications     Sabrina Plaza  Nurse Practitioner  Division of Endocrinology & Diabetes  In house pager #[phone]/long range pager #[phone]    If before 9AM or after 6PM, or on weekends/holidays, please call endocrine answering service for assistance ([phone]).  For nonurgent matters email [email or web address] for assistance.

## 2022-01-26 NOTE — PROGRESS NOTE ADULT - ASSESSMENT
67 yo F w/ diffuse cystic bronchiectasis and primary ciliary dyskinesia, on chronic home O2 and AVAPS qhs, ABPA, HTN, GERD, DM2 on long term insulin therapy, depression, liver cirrhosis and CBD stricture, recent admission for  pseudomonas PNA, admitted w/ acute on chronic hypoxic/hypercapnic respiratory failure 2/2 COVID19/bronchiectasis flare and concern for superimposed bacterial pneumonia, course c/b episodes of hypoglycemia.     # Acute on chronic hypoxic/hypercapnic respiratory failure, COVID19 infection, bronchiectasis, potential superimposed pneumonia  - pulm and ID input appreciated  - c/w duonebs, ATC 3% saline, aerobika device  - c/w supplemental O2, AVAPS qhs, monitor respiratory status closely  - symptomatic management of cough  - c/w IV meropenem as per ID  - ID recs appreciated, sputum cx polymicrobial w/ pseudomonas, klebsiella, elizabethkingia, sensitivities noted, elizabethkingia resistant to meropenem, levofloxacin added to regimen, per ID on discharge likely levaquin total of 2 weeks  - c/w remdesivir for 5 day course  - c/w dexamethasone for 10 day course (to end 1/28)  - c/w montelukast, Spiriva, Symbicort Flonase    # DM2 c/b steroid induced hyperglycemia and also by episodes of hypoglycemia  - Lantus dose was reduced to 8U yesterday 2/2 hypoglycemia as per endocrine, patient with additional episode of hypoglycemia overnight (FS 59 301AM) in setting of poor PO intake, to decrease Lantus dose further to 5U as per endocrine  - c/w premeal admelog as per endocrine, patient still w/ episodes of steroid induced hyperglycemia  - continue to monitor FS and adjust basal/bolus regimen as needed, monitor FS, continue w/ sliding scale coverage  - A1C 9.4  - endocrine assistance appreciated, will f/u further recs, regimen likely will need additional adjustment as patient completes course of steroids...     # Loose BM  - reports loose bowel movements  - hold bowel regimen, reassess   - monitor stool counts, if persistent, watery, check stool studies, GI PCR, c.diff    # Essential HTN  - c/w losartan, toprol xl    # GERD  - c/w protonix, pepcid    # Depression  c/w remeron, resumed home effexor (meds from other sources indicate was prescribed effexor 37.5 mg ER, last filled in December)    VTE ppx: lovenox    Dispo: pending improvement in respiratory status, antibiotic course, endocrine optimization          67 yo F w/ diffuse cystic bronchiectasis and primary ciliary dyskinesia, on chronic home O2 and AVAPS qhs, ABPA, HTN, GERD, DM2 on long term insulin therapy, depression, liver cirrhosis and CBD stricture, recent admission for  pseudomonas PNA, admitted w/ acute on chronic hypoxic/hypercapnic respiratory failure 2/2 COVID19/bronchiectasis flare and concern for superimposed bacterial pneumonia, course c/b episodes of hypoglycemia.     # Acute on chronic hypoxic/hypercapnic respiratory failure, COVID19 infection, bronchiectasis, potential superimposed pneumonia  - pulm and ID input appreciated  - c/w duonebs, ATC 3% saline, aerobika device  - c/w supplemental O2, AVAPS qhs, monitor respiratory status closely  - symptomatic management of cough  - c/w IV meropenem as per ID  - ID recs appreciated, sputum cx polymicrobial w/ pseudomonas, klebsiella, elizabethkingia, sensitivities noted, elizabethkingia resistant to meropenem, levofloxacin added to regimen, per ID on discharge likely levaquin total of 2 weeks  - c/w remdesivir for 5 day course  - c/w dexamethasone for 10 day course (to end 1/28)  - c/w montelukast, Spiriva, Symbicort Flonase    # DM2 c/b steroid induced hyperglycemia and also by episodes of hypoglycemia  - Lantus dose was reduced to 8U yesterday 2/2 hypoglycemia as per endocrine, patient with additional episode of hypoglycemia overnight (FS 59 301AM) in setting of poor PO intake, to decrease Lantus dose further to 5U as per endocrine  - c/w premeal admelog as per endocrine, patient still w/ episodes of steroid induced hyperglycemia  - continue to monitor FS and adjust basal/bolus regimen as needed, monitor FS, continue w/ sliding scale coverage  - A1C 9.4  - endocrine assistance appreciated, will f/u further recs, regimen likely will need additional adjustment as patient completes course of steroids...     # Loose BM  - reports loose bowel movements  - hold bowel regimen, reassess   - monitor stool counts, if persistent, watery, check stool studies, GI PCR, c.diff    # Essential HTN  - c/w losartan, toprol xl    # GERD  - c/w protonix, pepcid    # Depression  c/w remeron, resumed home effexor (meds from other sources indicate was prescribed effexor 37.5 mg ER, last filled in December)    # hypophosphatemia  - replete phos    # oropharyngeal thrush  - nystatin suspension as per pulm     VTE ppx: lovenox    Dispo: pending improvement in respiratory status, antibiotic course, endocrine optimization

## 2022-01-26 NOTE — PROGRESS NOTE ADULT - SUBJECTIVE AND OBJECTIVE BOX
Follow Up:  COVID, bronchiectasis exacerbation    Interval History: pt afebrile, improved cough    ROS:      All other systems negative    Constitutional: no fever, no chills  Cardiovascular:  no chest pain, no palpitation  Respiratory:  no SOB, + cough  GI:  no abd pain, no vomiting, no diarrhea  urinary: no dysuria, no hematuria, no flank pain  musculoskeletal:  no joint pain, no joint swelling  skin:  no rash  neurology:  no headache, no seizure        Allergies  No Known Allergies        ANTIMICROBIALS:  levoFLOXacin  Tablet 500 every 24 hours  meropenem  IVPB 1000 every 8 hours  nystatin    Suspension [id number] four times a day      OTHER MEDS:  acetaminophen     Tablet .. 650 milliGRAM(s) Oral every 6 hours PRN  ALBUTerol    90 MICROgram(s) HFA Inhaler 2 Puff(s) Inhalation every 6 hours PRN  benzocaine 15 mG/menthol 3.6 mG Lozenge 1 Lozenge Oral every 6 hours PRN  budesonide 160 MICROgram(s)/formoterol 4.5 MICROgram(s) Inhaler 2 Puff(s) Inhalation two times a day  calcium carbonate 1250 mG  + Vitamin D (OsCal 500 + D) 1 Tablet(s) Oral two times a day  dexAMETHasone  Injectable 6 milliGRAM(s) IV Push daily  dextrose 40% Gel 15 Gram(s) Oral once  dextrose 5%. 1000 milliLiter(s) IV Continuous <Continuous>  dextrose 5%. 1000 milliLiter(s) IV Continuous <Continuous>  dextrose 50% Injectable 25 Gram(s) IV Push once  dextrose 50% Injectable 12.5 Gram(s) IV Push once  dextrose 50% Injectable 25 Gram(s) IV Push once  enoxaparin Injectable 30 milliGRAM(s) SubCutaneous daily  famotidine    Tablet 40 milliGRAM(s) Oral at bedtime  ferrous    sulfate 325 milliGRAM(s) Oral daily  fluticasone propionate 50 MICROgram(s)/spray Nasal Spray 1 Spray(s) Both Nostrils two times a day  glucagon  Injectable 1 milliGRAM(s) IntraMuscular once  guaiFENesin  milliGRAM(s) Oral every 12 hours  hydrocortisone hemorrhoidal Suppository 1 Suppository(s) Rectal two times a day PRN  insulin glargine Injectable (LANTUS) 8 Unit(s) SubCutaneous at bedtime  insulin lispro (ADMELOG) corrective regimen sliding scale   SubCutaneous three times a day before meals  insulin lispro (ADMELOG) corrective regimen sliding scale   SubCutaneous at bedtime  insulin lispro Injectable (ADMELOG) 6 Unit(s) SubCutaneous three times a day before meals  ketotifen 0.025% Ophthalmic Solution 1 Drop(s) Both EYES two times a day  losartan 50 milliGRAM(s) Oral daily  metoprolol succinate ER 50 milliGRAM(s) Oral daily  mirtazapine 7.5 milliGRAM(s) Oral at bedtime  montelukast 10 milliGRAM(s) Oral daily  pantoprazole    Tablet 40 milliGRAM(s) Oral before breakfast  simethicone 80 milliGRAM(s) Chew every 8 hours PRN  tiotropium 18 MICROgram(s) Capsule 1 Capsule(s) Inhalation daily  venlafaxine XR. 37.5 milliGRAM(s) Oral daily      Vital Signs Last 24 Hrs  T(C): 36.9 (26 Jan 2022 04:58), Max: 36.9 (26 Jan 2022 04:58)  T(F): 98.4 (26 Jan 2022 04:58), Max: 98.4 (26 Jan 2022 04:58)  HR: 72 (26 Jan 2022 04:58) (70 - 81)  BP: 139/66 (26 Jan 2022 04:58) (119/54 - 139/66)  BP(mean): --  RR: 18 (26 Jan 2022 04:58) (18 - 20)  SpO2: 99% (26 Jan 2022 04:58) (97% - 99%)    Physical Exam:  General:    NAD, non toxic  Cardio:    regular S1,S2  Respiratory:  comfortable on NC  abd:   soft, BS +, not tender  :     no CVAT, no suprapubic tenderness, no francisco  Musculoskeletal : no joint swelling, no edema  Skin:    no rash  vascular: no phlebitis                            12.1   7.51  )-----------( 139      ( 26 Jan 2022 06:37 )             38.8       01-26    138  |  102  |  18  ----------------------------<  247<H>  3.6   |  33<H>  |  0.31<L>    Ca    8.5      26 Jan 2022 06:37  Phos  2.4     01-26  Mg     1.80     01-26    TPro  5.8<L>  /  Alb  2.5<L>  /  TBili  <0.2  /  DBili  x   /  AST  22  /  ALT  29  /  AlkPhos  139<H>  01-26          MICROBIOLOGY:  v  .Blood Blood-Peripheral  01-21-22   No growth to date.  --  --      .Sputum  01-21-22   Few Pseudomonas aeruginosa  Few Klebsiella pneumoniae ESBL  Rare Elizabethkingia meningoseptica  Few Mixed upper respiratory prashanth  --  Pseudomonas aeruginosa  Klebsiella pneumoniae ESBL  Elizabethkingia meningoseptica      Clean Catch Clean Catch (Midstream)  01-21-22   No growth  --  --          Rapid RVP Result: Detected (01-21 @ 15:08)        RADIOLOGY:  Images independently visualized and reviewed personally, findings as below  < from: Xray Chest 1 View- PORTABLE-Urgent (01.21.22 @ 14:32) >  IMPRESSION:    No radiographic evidence of pneumonia.      < end of copied text >

## 2022-01-26 NOTE — PROGRESS NOTE ADULT - ASSESSMENT
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), previous admissions for  Pseudomonas PNA but last pseudomonas was not CRE, now sent  by pulmonary for fever and cough, all family members had COVID 2-3 weeks ago but she stated to get dyspnea about 2 weeks ago and now worse  here afebrile, WBC: 12  COVID positive, legionella negative  blood cx negative, sputum cx with pseudomonas, klebsiella and elizabethkingia   CXR: no evidence of bacterial pneumonia     cystic bronchiectasis and ciliary dyskinesia  on home O2 now with fever, cough due to COVID (unvaccinated), ?bronchiectasis exacerbation  sputum cx polymicrobial with pseudomonas, klebsiella and elizabethkingia which is more s/o colonization, no consolidation on CXR  oropharyngeal candidasis    * the elizabethkingia is R to geoff, so  levo 500 qd was added 1/25  * c/w geoff for now started 1/21, now day 6 but on discharge will only do levofloxacin (sensitive to all organisms), likely total 2 weeks  * c/w nystatin  * c/w remdesivir now on dexa for up to a 10 day course  * monitor the O2 sat    The above assessment and plan was discussed with the primary team     Urmila Rose MD  Pager [phone]  After 5pm and on weekends call [phone]

## 2022-01-26 NOTE — CHART NOTE - NSCHARTNOTEFT_GEN_A_CORE
Patient w/ finger stick of 50s x2. Mentating at baseline. All other VSS. Patient accepting oral intake. RN activated hypoglycemia protocol. Blood glucose corrected to > 100 x2.  Endocrine following, recs appreciated. Will continue to monitor closely. Patient w/ finger stick of 50s x2. Mentating at baseline. All other VSS. Patient accepting oral intake. RN activated hypoglycemia protocol w/ dextrose 40% gel. Blood glucose corrected to > 100 x2.  Endocrine following, recs appreciated. Will continue to monitor closely.    POCT Blood Glucose:  210 mg/dL (01-26-22 @ 03:45)  126 mg/dL (01-26-22 @ 03:30)  65 mg/dL (01-26-22 @ 03:16)  59 mg/dL (01-26-22 @ 03:01)  67 mg/dL (01-26-22 @ 03:00)  176 mg/dL (01-25-22 @ 21:48)  288 mg/dL (01-25-22 @ 17:45)  312 mg/dL (01-25-22 @ 12:14)    dextrose 40% Gel   15 Gram(s) Oral (01-26-22 @ 03:25)    insulin glargine Injectable (LANTUS)   8 Unit(s) SubCutaneous (01-25-22 @ 21:58)    insulin lispro (ADMELOG) corrective regimen sliding scale   6 Unit(s) SubCutaneous (01-25-22 @ 18:12)   8 Unit(s) SubCutaneous (01-25-22 @ 13:09)    insulin lispro Injectable (ADMELOG)   3 Unit(s) SubCutaneous (01-25-22 @ 13:09)    insulin lispro Injectable (ADMELOG)   6 Unit(s) SubCutaneous (01-25-22 @ 18:13)

## 2022-01-26 NOTE — PROVIDER CONTACT NOTE (OTHER) - ACTION/TREATMENT ORDERED:
Follow hypoglycemia protocol
ACP Lalitha stated to hold sliding scale and to hold 8 units. ACP ordered 3 units to be given now instead.
ACP Cielo stated to give ordered dose of insulin and she will review chart to assess need to adjust her dosage.
LION Chavis stated to give insulin as ordered will possibly order fluids.
ACP Cielo aware and stated no further action required.
ACP Lalitha stated to hold all insulin for now and to notify her how much % of breakfast she ate and reassess fingerstick.

## 2022-01-26 NOTE — PROGRESS NOTE ADULT - SUBJECTIVE AND OBJECTIVE BOX
INCOMPLETE    SUBJECTIVE/OVERNIGHT EVENTS:  No overnight events      14 point ROS negative except as noted above      OBJECTIVE:  ICU Vital Signs Last 24 Hrs  T(C): 36.9 (26 Jan 2022 04:58), Max: 36.9 (26 Jan 2022 04:58)  T(F): 98.4 (26 Jan 2022 04:58), Max: 98.4 (26 Jan 2022 04:58)  HR: 72 (26 Jan 2022 04:58) (70 - 81)  BP: 139/66 (26 Jan 2022 04:58) (119/54 - 139/66)  BP(mean): --  ABP: --  ABP(mean): --  RR: 18 (26 Jan 2022 04:58) (18 - 20)  SpO2: 99% (26 Jan 2022 04:58) (96% - 99%)    Mode: NIV (Noninvasive Ventilation), RR (machine): 18, TV (machine): 450, FiO2: 50, PEEP: 5, ITime: 1, P-High: 25, P-Low: 15, PIP: 16    CAPILLARY BLOOD GLUCOSE      POCT Blood Glucose.: 192 mg/dL (26 Jan 2022 07:14)      PHYSICAL EXAM:  General: awake and alert, frail and cachectic appearing female lying in bed  HEENT: NC/AT, EOMI b/l, conjunctiva normal, MMM  Lymph Nodes: no cervical LAD  Neck: supple. full range of motion  Respiratory: rhonchorous breath sounds b/l, appears comfortable on NC, no conversational dyspnea or accessory muscle use  Cardiovascular: S1 S2 present, RRR, no m/r/g  Abdomen: soft, NT/ND, +BS  Extremities: no c/c/e  Skin: no rashes or lesions noted  Neurological: AAOx3, no focal deficits  Psychiatry: calm, cooperative    LINES:     MEDICATIONS  (STANDING):  budesonide 160 MICROgram(s)/formoterol 4.5 MICROgram(s) Inhaler 2 Puff(s) Inhalation two times a day  calcium carbonate 1250 mG  + Vitamin D (OsCal 500 + D) 1 Tablet(s) Oral two times a day  dexAMETHasone  Injectable 6 milliGRAM(s) IV Push daily  dextrose 40% Gel 15 Gram(s) Oral once  dextrose 5%. 1000 milliLiter(s) (50 mL/Hr) IV Continuous <Continuous>  dextrose 5%. 1000 milliLiter(s) (100 mL/Hr) IV Continuous <Continuous>  dextrose 50% Injectable 25 Gram(s) IV Push once  dextrose 50% Injectable 12.5 Gram(s) IV Push once  dextrose 50% Injectable 25 Gram(s) IV Push once  enoxaparin Injectable 30 milliGRAM(s) SubCutaneous daily  famotidine    Tablet 40 milliGRAM(s) Oral at bedtime  ferrous    sulfate 325 milliGRAM(s) Oral daily  fluticasone propionate 50 MICROgram(s)/spray Nasal Spray 1 Spray(s) Both Nostrils two times a day  glucagon  Injectable 1 milliGRAM(s) IntraMuscular once  guaiFENesin  milliGRAM(s) Oral every 12 hours  insulin glargine Injectable (LANTUS) 8 Unit(s) SubCutaneous at bedtime  insulin lispro (ADMELOG) corrective regimen sliding scale   SubCutaneous three times a day before meals  insulin lispro (ADMELOG) corrective regimen sliding scale   SubCutaneous at bedtime  insulin lispro Injectable (ADMELOG) 6 Unit(s) SubCutaneous three times a day before meals  ketotifen 0.025% Ophthalmic Solution 1 Drop(s) Both EYES two times a day  levoFLOXacin  Tablet 500 milliGRAM(s) Oral every 24 hours  losartan 50 milliGRAM(s) Oral daily  meropenem  IVPB 1000 milliGRAM(s) IV Intermittent every 8 hours  metoprolol succinate ER 50 milliGRAM(s) Oral daily  mirtazapine 7.5 milliGRAM(s) Oral at bedtime  montelukast 10 milliGRAM(s) Oral daily  pantoprazole    Tablet 40 milliGRAM(s) Oral before breakfast  tiotropium 18 MICROgram(s) Capsule 1 Capsule(s) Inhalation daily  venlafaxine XR. 37.5 milliGRAM(s) Oral daily    MEDICATIONS  (PRN):  acetaminophen     Tablet .. 650 milliGRAM(s) Oral every 6 hours PRN Temp greater or equal to 38C (100.4F), Mild Pain (1 - 3), Moderate Pain (4 - 6)  ALBUTerol    90 MICROgram(s) HFA Inhaler 2 Puff(s) Inhalation every 6 hours PRN Shortness of Breath and/or Wheezing  benzocaine 15 mG/menthol 3.6 mG Lozenge 1 Lozenge Oral every 6 hours PRN Sore Throat  hydrocortisone hemorrhoidal Suppository 1 Suppository(s) Rectal two times a day PRN Rectal pain/hemorrhoids  simethicone 80 milliGRAM(s) Chew every 8 hours PRN Gas        LABS:                        12.1   7.51  )-----------( 139      ( 26 Jan 2022 06:37 )             38.8     Hgb Trend: 12.1<--, 11.8<--, 11.7<--, 12.0<--, 11.6<--  01-26    138  |  102  |  18  ----------------------------<  247<H>  3.6   |  33<H>  |  0.31<L>    Ca    8.5      26 Jan 2022 06:37  Phos  2.4     01-26  Mg     1.80     01-26    TPro  5.8<L>  /  Alb  2.5<L>  /  TBili  <0.2  /  DBili  x   /  AST  22  /  ALT  29  /  AlkPhos  139<H>  01-26    Creatinine Trend: 0.31<--, 0.31<--, 0.35<--, 0.43<--, 0.37<--, 0.37<--            MICROBIOLOGY:     Culture - Blood (collected 21 Jan 2022 21:13)  Source: .Blood Blood-Peripheral  Preliminary Report (22 Jan 2022 22:01):    No growth to date.    Culture - Blood (collected 21 Jan 2022 21:13)  Source: .Blood Blood-Peripheral  Preliminary Report (22 Jan 2022 22:01):    No growth to date.    Culture - Sputum, Cystic Fibrosis (collected 21 Jan 2022 16:52)  Source: .Sputum  Gram Stain (21 Jan 2022 22:05):    Numerous polymorphonuclear leukocytes per low power field    No squamous epithelial cells per low power field    Numerous Gram Negative Rods per oil power field  Preliminary Report (23 Jan 2022 22:14):    Few Pseudomonas aeruginosa    Few Klebsiella pneumoniae    Rare Elizabethkingia meningoseptica    Few Mixed upper respiratory prashanth    Culture - Urine (collected 21 Jan 2022 14:18)  Source: Clean Catch Clean Catch (Midstream)  Final Report (22 Jan 2022 12:37):    No growth        RADIOLOGY:  [ ] Reviewed and interpreted by me    PULMONARY FUNCTION TESTS:    EKG: SUBJECTIVE/OVERNIGHT EVENTS:  No overnight events  Coughing and sputum production have improved  Still complaining of severe sore throat and unable to tolerate certain foods  Denies f/c, SOB at rest, chest pain, pleuritic pain, hemoptysis    14 point ROS negative except as noted above      OBJECTIVE:  ICU Vital Signs Last 24 Hrs  T(C): 36.9 (26 Jan 2022 04:58), Max: 36.9 (26 Jan 2022 04:58)  T(F): 98.4 (26 Jan 2022 04:58), Max: 98.4 (26 Jan 2022 04:58)  HR: 72 (26 Jan 2022 04:58) (70 - 81)  BP: 139/66 (26 Jan 2022 04:58) (119/54 - 139/66)  BP(mean): --  ABP: --  ABP(mean): --  RR: 18 (26 Jan 2022 04:58) (18 - 20)  SpO2: 99% (26 Jan 2022 04:58) (96% - 99%)    Mode: NIV (Noninvasive Ventilation), RR (machine): 18, TV (machine): 450, FiO2: 50, PEEP: 5, ITime: 1, P-High: 25, P-Low: 15, PIP: 16    CAPILLARY BLOOD GLUCOSE      POCT Blood Glucose.: 192 mg/dL (26 Jan 2022 07:14)      PHYSICAL EXAM:  General: awake and alert, frail and cachectic appearing female lying in bed  HEENT: NC/AT, EOMI b/l, conjunctiva normal, MMM, oropharyngeal thrush noted  Lymph Nodes: no cervical LAD  Neck: supple, full range of motion  Respiratory: rhonchorous breath sounds b/l improved, appears comfortable on NC, no conversational dyspnea or accessory muscle use  Cardiovascular: S1 S2 present, RRR, no m/r/g  Abdomen: soft, NT/ND, +BS  Extremities: no c/c/e  Skin: no rashes or lesions noted  Neurological: AAOx3, no focal deficits  Psychiatry: calm, cooperative    LINES:     MEDICATIONS  (STANDING):  budesonide 160 MICROgram(s)/formoterol 4.5 MICROgram(s) Inhaler 2 Puff(s) Inhalation two times a day  calcium carbonate 1250 mG  + Vitamin D (OsCal 500 + D) 1 Tablet(s) Oral two times a day  dexAMETHasone  Injectable 6 milliGRAM(s) IV Push daily  dextrose 40% Gel 15 Gram(s) Oral once  dextrose 5%. 1000 milliLiter(s) (50 mL/Hr) IV Continuous <Continuous>  dextrose 5%. 1000 milliLiter(s) (100 mL/Hr) IV Continuous <Continuous>  dextrose 50% Injectable 25 Gram(s) IV Push once  dextrose 50% Injectable 12.5 Gram(s) IV Push once  dextrose 50% Injectable 25 Gram(s) IV Push once  enoxaparin Injectable 30 milliGRAM(s) SubCutaneous daily  famotidine    Tablet 40 milliGRAM(s) Oral at bedtime  ferrous    sulfate 325 milliGRAM(s) Oral daily  fluticasone propionate 50 MICROgram(s)/spray Nasal Spray 1 Spray(s) Both Nostrils two times a day  glucagon  Injectable 1 milliGRAM(s) IntraMuscular once  guaiFENesin  milliGRAM(s) Oral every 12 hours  insulin glargine Injectable (LANTUS) 8 Unit(s) SubCutaneous at bedtime  insulin lispro (ADMELOG) corrective regimen sliding scale   SubCutaneous three times a day before meals  insulin lispro (ADMELOG) corrective regimen sliding scale   SubCutaneous at bedtime  insulin lispro Injectable (ADMELOG) 6 Unit(s) SubCutaneous three times a day before meals  ketotifen 0.025% Ophthalmic Solution 1 Drop(s) Both EYES two times a day  levoFLOXacin  Tablet 500 milliGRAM(s) Oral every 24 hours  losartan 50 milliGRAM(s) Oral daily  meropenem  IVPB 1000 milliGRAM(s) IV Intermittent every 8 hours  metoprolol succinate ER 50 milliGRAM(s) Oral daily  mirtazapine 7.5 milliGRAM(s) Oral at bedtime  montelukast 10 milliGRAM(s) Oral daily  pantoprazole    Tablet 40 milliGRAM(s) Oral before breakfast  tiotropium 18 MICROgram(s) Capsule 1 Capsule(s) Inhalation daily  venlafaxine XR. 37.5 milliGRAM(s) Oral daily    MEDICATIONS  (PRN):  acetaminophen     Tablet .. 650 milliGRAM(s) Oral every 6 hours PRN Temp greater or equal to 38C (100.4F), Mild Pain (1 - 3), Moderate Pain (4 - 6)  ALBUTerol    90 MICROgram(s) HFA Inhaler 2 Puff(s) Inhalation every 6 hours PRN Shortness of Breath and/or Wheezing  benzocaine 15 mG/menthol 3.6 mG Lozenge 1 Lozenge Oral every 6 hours PRN Sore Throat  hydrocortisone hemorrhoidal Suppository 1 Suppository(s) Rectal two times a day PRN Rectal pain/hemorrhoids  simethicone 80 milliGRAM(s) Chew every 8 hours PRN Gas        LABS:                        12.1   7.51  )-----------( 139      ( 26 Jan 2022 06:37 )             38.8     Hgb Trend: 12.1<--, 11.8<--, 11.7<--, 12.0<--, 11.6<--  01-26    138  |  102  |  18  ----------------------------<  247<H>  3.6   |  33<H>  |  0.31<L>    Ca    8.5      26 Jan 2022 06:37  Phos  2.4     01-26  Mg     1.80     01-26    TPro  5.8<L>  /  Alb  2.5<L>  /  TBili  <0.2  /  DBili  x   /  AST  22  /  ALT  29  /  AlkPhos  139<H>  01-26    Creatinine Trend: 0.31<--, 0.31<--, 0.35<--, 0.43<--, 0.37<--, 0.37<--            MICROBIOLOGY:     Culture - Blood (collected 21 Jan 2022 21:13)  Source: .Blood Blood-Peripheral  Preliminary Report (22 Jan 2022 22:01):    No growth to date.    Culture - Blood (collected 21 Jan 2022 21:13)  Source: .Blood Blood-Peripheral  Preliminary Report (22 Jan 2022 22:01):    No growth to date.    Culture - Sputum, Cystic Fibrosis (collected 21 Jan 2022 16:52)  Source: .Sputum  Gram Stain (21 Jan 2022 22:05):    Numerous polymorphonuclear leukocytes per low power field    No squamous epithelial cells per low power field    Numerous Gram Negative Rods per oil power field  Preliminary Report (23 Jan 2022 22:14):    Few Pseudomonas aeruginosa    Few Klebsiella pneumoniae    Rare Elizabethkingia meningoseptica    Few Mixed upper respiratory prashanth    Culture - Urine (collected 21 Jan 2022 14:18)  Source: Clean Catch Clean Catch (Midstream)  Final Report (22 Jan 2022 12:37):    No growth        RADIOLOGY:  [ ] Reviewed and interpreted by me    PULMONARY FUNCTION TESTS:    EKG:

## 2022-01-26 NOTE — PROVIDER CONTACT NOTE (OTHER) - SITUATION
Pt is Blood sugar:59, 65
Fingerstick 491 repeat 885
Patient hypoglyemic (see hypoglycemic provider contact note). Patient asymptomatic.
/54, HR 72
Patient fingerstick 401. Patient asymptomatic
Patient was hypoglycemic this morning but resolved after apple juice was given. ACP stated to hold insulin until after breakfast and to notify what fingerstick value was and how much patient ate.

## 2022-01-26 NOTE — PROGRESS NOTE ADULT - SUBJECTIVE AND OBJECTIVE BOX
Chief Complaint: DM 2, COVID+ on Dexamethasone    History: Patient seen at bedside. Offered  phone, patient declined and called her son Alejandra ([phone]) for interpretation to Leandro  Noted patient had episode of hypoglycemia overnight, resolved with oral dextrose gel.   Discussed with RN, patient complaining of burning in mouth which is limiting PO intake, she was switched to a pureed diet and tolerated breakfast this AM  Remains on Dexamethasone 6 mg daily until 1/28    Discussed outpatient diabetes regimen with son (patient unable to report names/dosages on time of initial consult):  Per son, patient takes Basaglar 14 units daily at bedtime, Admelog 5-8 units TID before meals (typically about 6 units). Son reports they have a correctional scale at home to determine dose (could not specify exact ISF)  Reports patient uses insulin PENS, never skips doses, changes pen needle for every injection, injects into abdomen primarily   Patient has a PCP, but no endocrinologist. She has Medicaid insurance     MEDICATIONS  (STANDING):  budesonide 160 MICROgram(s)/formoterol 4.5 MICROgram(s) Inhaler 2 Puff(s) Inhalation two times a day  calcium carbonate 1250 mG  + Vitamin D (OsCal 500 + D) 1 Tablet(s) Oral two times a day  dexAMETHasone  Injectable 6 milliGRAM(s) IV Push daily  dextrose 40% Gel 15 Gram(s) Oral once  dextrose 5%. 1000 milliLiter(s) (50 mL/Hr) IV Continuous <Continuous>  dextrose 5%. 1000 milliLiter(s) (100 mL/Hr) IV Continuous <Continuous>  dextrose 50% Injectable 25 Gram(s) IV Push once  dextrose 50% Injectable 12.5 Gram(s) IV Push once  dextrose 50% Injectable 25 Gram(s) IV Push once  enoxaparin Injectable 30 milliGRAM(s) SubCutaneous daily  famotidine    Tablet 40 milliGRAM(s) Oral at bedtime  ferrous    sulfate 325 milliGRAM(s) Oral daily  fluticasone propionate 50 MICROgram(s)/spray Nasal Spray 1 Spray(s) Both Nostrils two times a day  glucagon  Injectable 1 milliGRAM(s) IntraMuscular once  guaiFENesin  milliGRAM(s) Oral every 12 hours  insulin glargine Injectable (LANTUS) 8 Unit(s) SubCutaneous at bedtime  insulin lispro (ADMELOG) corrective regimen sliding scale   SubCutaneous three times a day before meals  insulin lispro (ADMELOG) corrective regimen sliding scale   SubCutaneous at bedtime  insulin lispro Injectable (ADMELOG) 6 Unit(s) SubCutaneous three times a day before meals  ketotifen 0.025% Ophthalmic Solution 1 Drop(s) Both EYES two times a day  levoFLOXacin  Tablet 500 milliGRAM(s) Oral every 24 hours  losartan 50 milliGRAM(s) Oral daily  meropenem  IVPB 1000 milliGRAM(s) IV Intermittent every 8 hours  metoprolol succinate ER 50 milliGRAM(s) Oral daily  mirtazapine 7.5 milliGRAM(s) Oral at bedtime  montelukast 10 milliGRAM(s) Oral daily  nystatin    Suspension 046483 Unit(s) Oral four times a day  pantoprazole    Tablet 40 milliGRAM(s) Oral before breakfast  tiotropium 18 MICROgram(s) Capsule 1 Capsule(s) Inhalation daily  venlafaxine XR. 37.5 milliGRAM(s) Oral daily    MEDICATIONS  (PRN):  acetaminophen     Tablet .. 650 milliGRAM(s) Oral every 6 hours PRN Temp greater or equal to 38C (100.4F), Mild Pain (1 - 3), Moderate Pain (4 - 6)  ALBUTerol    90 MICROgram(s) HFA Inhaler 2 Puff(s) Inhalation every 6 hours PRN Shortness of Breath and/or Wheezing  benzocaine 15 mG/menthol 3.6 mG Lozenge 1 Lozenge Oral every 6 hours PRN Sore Throat  hydrocortisone hemorrhoidal Suppository 1 Suppository(s) Rectal two times a day PRN Rectal pain/hemorrhoids  simethicone 80 milliGRAM(s) Chew every 8 hours PRN Gas    No Known Allergies    Review of Systems:  HEENT: +mouth pain   Cardiovascular: No chest pain  Respiratory: No SOB  GI: No nausea, vomiting    PHYSICAL EXAM:  VITALS: T(C): 36.9 (01-26-22 @ 14:09)  T(F): 98.4 (01-26-22 @ 14:09), Max: 98.4 (01-26-22 @ 04:58)  HR: 78 (01-26-22 @ 14:09) (70 - 81)  BP: 141/76 (01-26-22 @ 14:09) (127/70 - 141/76)  RR:  (18 - 18)  SpO2:  (97% - 99%)  Wt(kg): --  GENERAL: NAD  EYES: No proptosis, no lid lag, anicteric  HEENT:  Atraumatic, Normocephalic, moist mucous membranes  RESPIRATORY: unlabored respirations     CAPILLARY BLOOD GLUCOSE    POCT Blood Glucose.: 216 mg/dL (26 Jan 2022 12:33)  POCT Blood Glucose.: 145 mg/dL (26 Jan 2022 09:07)  POCT Blood Glucose.: 192 mg/dL (26 Jan 2022 07:14)  POCT Blood Glucose.: 210 mg/dL (26 Jan 2022 03:45)  POCT Blood Glucose.: 126 mg/dL (26 Jan 2022 03:30)  POCT Blood Glucose.: 65 mg/dL (26 Jan 2022 03:16)  POCT Blood Glucose.: 59 mg/dL (26 Jan 2022 03:01)  POCT Blood Glucose.: 67 mg/dL (26 Jan 2022 03:00)  POCT Blood Glucose.: 176 mg/dL (25 Jan 2022 21:48)  POCT Blood Glucose.: 288 mg/dL (25 Jan 2022 17:45)      01-26    138  |  102  |  18  ----------------------------<  247<H>  3.6   |  33<H>  |  0.31<L>    EGFR if : 139  EGFR if non : 120    Ca    8.5      01-26  Mg     1.80     01-26  Phos  2.4     01-26    TPro  5.8<L>  /  Alb  2.5<L>  /  TBili  <0.2  /  DBili  x   /  AST  22  /  ALT  29  /  AlkPhos  139<H>  01-26      Thyroid Function Tests:  01-21 @ 14:31 TSH 4.32 FreeT4 -- T3 -- Anti TPO -- Anti Thyroglobulin Ab -- TSI --      A1C with Estimated Average Glucose Result: 9.4 % (01-21-22 @ 14:26)  A1C with Estimated Average Glucose Result: 8.9 % (06-30-21 @ 07:28)  A1C with Estimated Average Glucose Result: 8.5 % (04-08-21 @ 07:36)  A1C with Estimated Average Glucose Result: 8.2 % (04-01-21 @ 15:25)    Diet, Pureed:   Consistent Carbohydrate Evening Snack (CSTCHOSN)  DASH/TLC Sodium & Cholesterol Restricted (DASH)  Supplement Feeding Modality:  Oral  Glucerna Shake Cans or Servings Per Day:  1       Frequency:  Two Times a day (01-25-22 @ 14:07)

## 2022-01-26 NOTE — PROGRESS NOTE ADULT - ASSESSMENT
Patient is a a 65 yo F w/ Bronchiectasis, chronic hypercapnic/hypoxemic respiratory failure (2 to 5L O2/IVAPS - RR 18, EPAP 5, PS 15-25, average TV is 443), Liver Cirrohsis with prior CBD stricture s/p dilation and HCV ab+ who presents with fevers, productive sputum and wheezing, found to be COVID positive.     #Acute on Chronic hypoxemic/hypercapnic respiratory failure  #Acute bronchiectasis flare 2/2 bacterial process superimposed by COVID infection  - CXR 1/21 showed diffuse bilateral coarse reticular markings grossly unchanged from prior CXR  - Duonebs and ATC/3% Saline q6h  - aerobika prn for airway clearance  - Decadron 6mg PO daily x7 days as patient already took Pred 40mg x 3 days to end 1/28  - completed course of remdesivir  - appreciate ID recs  - continue meropenem while inpatient  - continue levaquin for 2 week course (can be completed outpatient)  - supplemental O2 to maintain SpO2 > 88%, remains on home 2L NC  - NIPPV QHS, can use AVAPS - RR 18, EPAP 5, Min IP 15, Max IP 25,   - C/w Mirtazapine and Venlafaxine for anxiety  - magic mouthwash/benzocaine spray or lozenges for sore throat    Jose De Jesus Mead PGY-6  Pulmonary/Critical Care Fellow  Pager: [phone] ([facility]) [phone] ([facility])  Pulmonary Spectra #[id number] ([facility]) / [phone] ([facility])     Patient is a a 65 yo F w/ Bronchiectasis, chronic hypercapnic/hypoxemic respiratory failure (2 to 5L O2/IVAPS - RR 18, EPAP 5, PS 15-25, average TV is 443), Liver Cirrohsis with prior CBD stricture s/p dilation and HCV ab+ who presents with fevers, productive sputum and wheezing, found to be COVID positive.     #Acute on Chronic hypoxemic/hypercapnic respiratory failure  #Acute bronchiectasis flare 2/2 bacterial process superimposed by COVID infection  - CXR 1/21 showed diffuse bilateral coarse reticular markings grossly unchanged from prior CXR  - Duonebs and ATC/3% Saline q6h  - aerobika prn for airway clearance  - Decadron 6mg PO daily x7 days as patient already took Pred 40mg x 3 days to end 1/28  - completed course of remdesivir  - appreciate ID recs  - continue meropenem while inpatient  - continue levaquin for 2 week course (can be completed outpatient)  - supplemental O2 to maintain SpO2 > 88%, remains on home 2L NC  - NIPPV QHS: AVAPS RR 18, EPAP 5, Min IP 15, Max IP 25,   - C/w Mirtazapine and Venlafaxine for anxiety    #Oropharyngeal candidiasis  - start nystatin swish and swallow qid  - magic mouthwash/benzocaine spray or lozenges for sore throat    Jose De Jesus Mead PGY-6  Pulmonary/Critical Care Fellow  Pager: [phone] ([facility]) [phone] ([facility])  Pulmonary Spectra #[id number] ([facility]) / [phone] ([facility])

## 2022-01-26 NOTE — PROVIDER CONTACT NOTE (OTHER) - DATE AND TIME:
23-Jan-2022 15:35
23-Jan-2022 18:00
23-Jan-2022 12:37
24-Jan-2022 10:40
26-Jan-2022 03:20
24-Jan-2022 09:25

## 2022-01-26 NOTE — PROGRESS NOTE ADULT - SUBJECTIVE AND OBJECTIVE BOX
[facility] Medicine  Pager [phone]    Patient is a 66y old  Female who presents with a chief complaint of SOB (26 Jan 2022 13:16)      INTERVAL HPI/OVERNIGHT EVENTS:    MEDICATIONS  (STANDING):  budesonide 160 MICROgram(s)/formoterol 4.5 MICROgram(s) Inhaler 2 Puff(s) Inhalation two times a day  calcium carbonate 1250 mG  + Vitamin D (OsCal 500 + D) 1 Tablet(s) Oral two times a day  dexAMETHasone  Injectable 6 milliGRAM(s) IV Push daily  dextrose 40% Gel 15 Gram(s) Oral once  dextrose 5%. 1000 milliLiter(s) (50 mL/Hr) IV Continuous <Continuous>  dextrose 5%. 1000 milliLiter(s) (100 mL/Hr) IV Continuous <Continuous>  dextrose 50% Injectable 25 Gram(s) IV Push once  dextrose 50% Injectable 12.5 Gram(s) IV Push once  dextrose 50% Injectable 25 Gram(s) IV Push once  enoxaparin Injectable 30 milliGRAM(s) SubCutaneous daily  famotidine    Tablet 40 milliGRAM(s) Oral at bedtime  ferrous    sulfate 325 milliGRAM(s) Oral daily  fluticasone propionate 50 MICROgram(s)/spray Nasal Spray 1 Spray(s) Both Nostrils two times a day  glucagon  Injectable 1 milliGRAM(s) IntraMuscular once  guaiFENesin  milliGRAM(s) Oral every 12 hours  insulin glargine Injectable (LANTUS) 8 Unit(s) SubCutaneous at bedtime  insulin lispro (ADMELOG) corrective regimen sliding scale   SubCutaneous three times a day before meals  insulin lispro (ADMELOG) corrective regimen sliding scale   SubCutaneous at bedtime  insulin lispro Injectable (ADMELOG) 6 Unit(s) SubCutaneous three times a day before meals  ketotifen 0.025% Ophthalmic Solution 1 Drop(s) Both EYES two times a day  levoFLOXacin  Tablet 500 milliGRAM(s) Oral every 24 hours  losartan 50 milliGRAM(s) Oral daily  meropenem  IVPB 1000 milliGRAM(s) IV Intermittent every 8 hours  metoprolol succinate ER 50 milliGRAM(s) Oral daily  mirtazapine 7.5 milliGRAM(s) Oral at bedtime  montelukast 10 milliGRAM(s) Oral daily  nystatin    Suspension 815140 Unit(s) Oral four times a day  pantoprazole    Tablet 40 milliGRAM(s) Oral before breakfast  tiotropium 18 MICROgram(s) Capsule 1 Capsule(s) Inhalation daily  venlafaxine XR. 37.5 milliGRAM(s) Oral daily    MEDICATIONS  (PRN):  acetaminophen     Tablet .. 650 milliGRAM(s) Oral every 6 hours PRN Temp greater or equal to 38C (100.4F), Mild Pain (1 - 3), Moderate Pain (4 - 6)  ALBUTerol    90 MICROgram(s) HFA Inhaler 2 Puff(s) Inhalation every 6 hours PRN Shortness of Breath and/or Wheezing  benzocaine 15 mG/menthol 3.6 mG Lozenge 1 Lozenge Oral every 6 hours PRN Sore Throat  hydrocortisone hemorrhoidal Suppository 1 Suppository(s) Rectal two times a day PRN Rectal pain/hemorrhoids  simethicone 80 milliGRAM(s) Chew every 8 hours PRN Gas      Allergies    No Known Allergies    Intolerances        REVIEW OF SYSTEMS:  Please see interval HPI:    Vital Signs Last 24 Hrs  T(C): 36.9 (26 Jan 2022 04:58), Max: 36.9 (26 Jan 2022 04:58)  T(F): 98.4 (26 Jan 2022 04:58), Max: 98.4 (26 Jan 2022 04:58)  HR: 72 (26 Jan 2022 04:58) (70 - 81)  BP: 139/66 (26 Jan 2022 04:58) (119/54 - 139/66)  BP(mean): --  RR: 18 (26 Jan 2022 04:58) (18 - 20)  SpO2: 99% (26 Jan 2022 04:58) (97% - 99%)  I&O's Detail        PHYSICAL EXAM:  GENERAL:   HEAD:    EYES:   ENMT:   NECK:   NERVOUS SYSTEM:    CHEST/LUNG:   HEART:   ABDOMEN:   EXTREMITIES:    LYMPH:   SKIN:     LABS:                        12.1   7.51  )-----------( 139      ( 26 Jan 2022 06:37 )             38.8     26 Jan 2022 06:37    138    |  102    |  18     ----------------------------<  247    3.6     |  33     |  0.31     Ca    8.5        26 Jan 2022 06:37  Phos  2.4       26 Jan 2022 06:37  Mg     1.80      26 Jan 2022 06:37    TPro  5.8    /  Alb  2.5    /  TBili  <0.2   /  DBili  x      /  AST  22     /  ALT  29     /  AlkPhos  139    26 Jan 2022 06:37      CAPILLARY BLOOD GLUCOSE      POCT Blood Glucose.: 216 mg/dL (26 Jan 2022 12:33)  POCT Blood Glucose.: 145 mg/dL (26 Jan 2022 09:07)  POCT Blood Glucose.: 192 mg/dL (26 Jan 2022 07:14)  POCT Blood Glucose.: 210 mg/dL (26 Jan 2022 03:45)  POCT Blood Glucose.: 126 mg/dL (26 Jan 2022 03:30)  POCT Blood Glucose.: 65 mg/dL (26 Jan 2022 03:16)  POCT Blood Glucose.: 59 mg/dL (26 Jan 2022 03:01)  POCT Blood Glucose.: 67 mg/dL (26 Jan 2022 03:00)  POCT Blood Glucose.: 176 mg/dL (25 Jan 2022 21:48)  POCT Blood Glucose.: 288 mg/dL (25 Jan 2022 17:45)    BLOOD CULTURE    RADIOLOGY & ADDITIONAL TESTS:    Imaging Personally Reviewed:  [ ] YES     Consultant(s) Notes Reviewed:      Care Discussed with Consultants/Other Providers: [facility] Medicine  Pager [phone]    Patient is a 66y old  Female who presents with a chief complaint of SOB (26 Jan 2022 13:16)      INTERVAL HPI/OVERNIGHT EVENTS:  Hypoglycemic to 50s overnight.   Son Alejandra providing Leandro interpretation per patient request.   Patient still reporting loose BMs. Having some mouth discomfort, making it harder to eat, is on modified consistency diet, receiving nystatin. But overall is feeling better. Discussed plans for antibiotics as per ID and continued optimization, endocrine following, wish to avoid further hypoglycemia. Patient and son in agreement w/ plan.     MEDICATIONS  (STANDING):  budesonide 160 MICROgram(s)/formoterol 4.5 MICROgram(s) Inhaler 2 Puff(s) Inhalation two times a day  calcium carbonate 1250 mG  + Vitamin D (OsCal 500 + D) 1 Tablet(s) Oral two times a day  dexAMETHasone  Injectable 6 milliGRAM(s) IV Push daily  dextrose 40% Gel 15 Gram(s) Oral once  dextrose 5%. 1000 milliLiter(s) (50 mL/Hr) IV Continuous <Continuous>  dextrose 5%. 1000 milliLiter(s) (100 mL/Hr) IV Continuous <Continuous>  dextrose 50% Injectable 25 Gram(s) IV Push once  dextrose 50% Injectable 12.5 Gram(s) IV Push once  dextrose 50% Injectable 25 Gram(s) IV Push once  enoxaparin Injectable 30 milliGRAM(s) SubCutaneous daily  famotidine    Tablet 40 milliGRAM(s) Oral at bedtime  ferrous    sulfate 325 milliGRAM(s) Oral daily  fluticasone propionate 50 MICROgram(s)/spray Nasal Spray 1 Spray(s) Both Nostrils two times a day  glucagon  Injectable 1 milliGRAM(s) IntraMuscular once  guaiFENesin  milliGRAM(s) Oral every 12 hours  insulin glargine Injectable (LANTUS) 8 Unit(s) SubCutaneous at bedtime  insulin lispro (ADMELOG) corrective regimen sliding scale   SubCutaneous three times a day before meals  insulin lispro (ADMELOG) corrective regimen sliding scale   SubCutaneous at bedtime  insulin lispro Injectable (ADMELOG) 6 Unit(s) SubCutaneous three times a day before meals  ketotifen 0.025% Ophthalmic Solution 1 Drop(s) Both EYES two times a day  levoFLOXacin  Tablet 500 milliGRAM(s) Oral every 24 hours  losartan 50 milliGRAM(s) Oral daily  meropenem  IVPB 1000 milliGRAM(s) IV Intermittent every 8 hours  metoprolol succinate ER 50 milliGRAM(s) Oral daily  mirtazapine 7.5 milliGRAM(s) Oral at bedtime  montelukast 10 milliGRAM(s) Oral daily  nystatin    Suspension 127588 Unit(s) Oral four times a day  pantoprazole    Tablet 40 milliGRAM(s) Oral before breakfast  tiotropium 18 MICROgram(s) Capsule 1 Capsule(s) Inhalation daily  venlafaxine XR. 37.5 milliGRAM(s) Oral daily    MEDICATIONS  (PRN):  acetaminophen     Tablet .. 650 milliGRAM(s) Oral every 6 hours PRN Temp greater or equal to 38C (100.4F), Mild Pain (1 - 3), Moderate Pain (4 - 6)  ALBUTerol    90 MICROgram(s) HFA Inhaler 2 Puff(s) Inhalation every 6 hours PRN Shortness of Breath and/or Wheezing  benzocaine 15 mG/menthol 3.6 mG Lozenge 1 Lozenge Oral every 6 hours PRN Sore Throat  hydrocortisone hemorrhoidal Suppository 1 Suppository(s) Rectal two times a day PRN Rectal pain/hemorrhoids  simethicone 80 milliGRAM(s) Chew every 8 hours PRN Gas    Allergies  No Known Allergies    Intolerances    REVIEW OF SYSTEMS:  Please see interval HPI:    Vital Signs Last 24 Hrs  T(C): 36.9 (26 Jan 2022 04:58), Max: 36.9 (26 Jan 2022 04:58)  T(F): 98.4 (26 Jan 2022 04:58), Max: 98.4 (26 Jan 2022 04:58)  HR: 72 (26 Jan 2022 04:58) (70 - 81)  BP: 139/66 (26 Jan 2022 04:58) (119/54 - 139/66)  RR: 18 (26 Jan 2022 04:58) (18 - 20)  SpO2: 99% (26 Jan 2022 04:58) (97% - 99%)  I&O's Detail    PHYSICAL EXAM:  GENERAL: NAD, sitting in bed, +less frequent coughing  HEAD:  NC/AT  EYES: EOMI, clear sclera/conjunctiva  ENMT: MMM, concern for oropharyngeal thrush  NECK: supple  NERVOUS SYSTEM: moving extremities, non-focal   CHEST/LUNG: NC in place, no respiratory distress on supplemental O2, coarse BS  HEART: S1S2 RRR  ABDOMEN: soft, non-tender  EXTREMITIES:  no c/c/e      LABS:                        12.1   7.51  )-----------( 139      ( 26 Jan 2022 06:37 )             38.8     26 Jan 2022 06:37    138    |  102    |  18     ----------------------------<  247    3.6     |  33     |  0.31     Ca    8.5        26 Jan 2022 06:37  Phos  2.4       26 Jan 2022 06:37  Mg     1.80      26 Jan 2022 06:37    TPro  5.8    /  Alb  2.5    /  TBili  <0.2   /  DBili  x      /  AST  22     /  ALT  29     /  AlkPhos  139    26 Jan 2022 06:37      CAPILLARY BLOOD GLUCOSE      POCT Blood Glucose.: 216 mg/dL (26 Jan 2022 12:33)  POCT Blood Glucose.: 145 mg/dL (26 Jan 2022 09:07)  POCT Blood Glucose.: 192 mg/dL (26 Jan 2022 07:14)  POCT Blood Glucose.: 210 mg/dL (26 Jan 2022 03:45)  POCT Blood Glucose.: 126 mg/dL (26 Jan 2022 03:30)  POCT Blood Glucose.: 65 mg/dL (26 Jan 2022 03:16)  POCT Blood Glucose.: 59 mg/dL (26 Jan 2022 03:01)  POCT Blood Glucose.: 67 mg/dL (26 Jan 2022 03:00)  POCT Blood Glucose.: 176 mg/dL (25 Jan 2022 21:48)  POCT Blood Glucose.: 288 mg/dL (25 Jan 2022 17:45)    BLOOD CULTURE    RADIOLOGY & ADDITIONAL TESTS:    Imaging Personally Reviewed:  [ ] YES     Consultant(s) Notes Reviewed:  Pulm, ID, Endo    Care Discussed with Consultants/Other Providers: LION Celis re: hypoglycemia, endocrine decreasing insulin regimen, c/w abx as per pulm/ID, monitor loose BMs

## 2022-01-26 NOTE — PROVIDER CONTACT NOTE (OTHER) - ASSESSMENT
Patient was hypoglycemic this morning but resolved after apple juice was given. ACP stated to hold insulin until after breakfast and to notify what fingerstick value was and how much patient ate. Patient ate 90-95% of her breakfast and fingerstick was 198.
Patient fingerstick 401. Patient asymptomatic
Patient hypoglyemic (see hypoglycemic provider contact note). Patient asymptomatic.
/54, HR 72. Patient asymptomatic.
Pt asymptomatic
Fingerstick 491 repeat 507

## 2022-01-27 LAB
ALBUMIN SERPL ELPH-MCNC: 2.5 G/DL — LOW (ref 3.3–5)
ALP SERPL-CCNC: 130 U/L — HIGH (ref 40–120)
ALT FLD-CCNC: 28 U/L — SIGNIFICANT CHANGE UP (ref 4–33)
ANION GAP SERPL CALC-SCNC: 6 MMOL/L — LOW (ref 7–14)
AST SERPL-CCNC: 25 U/L — SIGNIFICANT CHANGE UP (ref 4–32)
BILIRUB DIRECT SERPL-MCNC: <0.2 MG/DL — SIGNIFICANT CHANGE UP (ref 0–0.3)
BILIRUB INDIRECT FLD-MCNC: >0 MG/DL — SIGNIFICANT CHANGE UP (ref 0–1)
BILIRUB SERPL-MCNC: 0.2 MG/DL — SIGNIFICANT CHANGE UP (ref 0.2–1.2)
BUN SERPL-MCNC: 18 MG/DL — SIGNIFICANT CHANGE UP (ref 7–23)
CALCIUM SERPL-MCNC: 8.7 MG/DL — SIGNIFICANT CHANGE UP (ref 8.4–10.5)
CHLORIDE SERPL-SCNC: 101 MMOL/L — SIGNIFICANT CHANGE UP (ref 98–107)
CO2 SERPL-SCNC: 32 MMOL/L — HIGH (ref 22–31)
CREAT SERPL-MCNC: 0.33 MG/DL — LOW (ref 0.5–1.3)
CRP SERPL-MCNC: <4 MG/L — SIGNIFICANT CHANGE UP
FERRITIN SERPL-MCNC: 286 NG/ML — HIGH (ref 15–150)
GLUCOSE SERPL-MCNC: 176 MG/DL — HIGH (ref 70–99)
HCT VFR BLD CALC: 38.4 % — SIGNIFICANT CHANGE UP (ref 34.5–45)
HGB BLD-MCNC: 11.9 G/DL — SIGNIFICANT CHANGE UP (ref 11.5–15.5)
LDH SERPL L TO P-CCNC: 152 U/L — SIGNIFICANT CHANGE UP (ref 135–225)
MCHC RBC-ENTMCNC: 28.4 PG — SIGNIFICANT CHANGE UP (ref 27–34)
MCHC RBC-ENTMCNC: 31 GM/DL — LOW (ref 32–36)
MCV RBC AUTO: 91.6 FL — SIGNIFICANT CHANGE UP (ref 80–100)
NRBC # BLD: 0 /100 WBCS — SIGNIFICANT CHANGE UP
NRBC # FLD: 0 K/UL — SIGNIFICANT CHANGE UP
PLATELET # BLD AUTO: 145 K/UL — LOW (ref 150–400)
POTASSIUM SERPL-MCNC: 4.1 MMOL/L — SIGNIFICANT CHANGE UP (ref 3.5–5.3)
POTASSIUM SERPL-SCNC: 4.1 MMOL/L — SIGNIFICANT CHANGE UP (ref 3.5–5.3)
PROCALCITONIN SERPL-MCNC: 0.05 NG/ML — SIGNIFICANT CHANGE UP (ref 0.02–0.1)
PROT SERPL-MCNC: 5.3 G/DL — LOW (ref 6–8.3)
RBC # BLD: 4.19 M/UL — SIGNIFICANT CHANGE UP (ref 3.8–5.2)
RBC # FLD: 13.3 % — SIGNIFICANT CHANGE UP (ref 10.3–14.5)
SODIUM SERPL-SCNC: 139 MMOL/L — SIGNIFICANT CHANGE UP (ref 135–145)
WBC # BLD: 7.76 K/UL — SIGNIFICANT CHANGE UP (ref 3.8–10.5)
WBC # FLD AUTO: 7.76 K/UL — SIGNIFICANT CHANGE UP (ref 3.8–10.5)

## 2022-01-27 PROCEDURE — 99233 SBSQ HOSP IP/OBS HIGH 50: CPT

## 2022-01-27 PROCEDURE — 99232 SBSQ HOSP IP/OBS MODERATE 35: CPT

## 2022-01-27 RX ORDER — INSULIN LISPRO 100/ML
7 VIAL (ML) SUBCUTANEOUS
Refills: 0 | Status: DISCONTINUED | OUTPATIENT
Start: 2022-01-27 | End: 2022-01-29

## 2022-01-27 RX ORDER — INSULIN GLARGINE 100 [IU]/ML
7 INJECTION, SOLUTION SUBCUTANEOUS AT BEDTIME
Refills: 0 | Status: DISCONTINUED | OUTPATIENT
Start: 2022-01-27 | End: 2022-01-29

## 2022-01-27 RX ORDER — LACTOBACILLUS ACIDOPHILUS 100MM CELL
1 CAPSULE ORAL
Refills: 0 | Status: DISCONTINUED | OUTPATIENT
Start: 2022-01-27 | End: 2022-01-31

## 2022-01-27 RX ADMIN — Medication 500000 UNIT(S): at 17:47

## 2022-01-27 RX ADMIN — Medication 6 MILLIGRAM(S): at 06:07

## 2022-01-27 RX ADMIN — KETOTIFEN FUMARATE 1 DROP(S): 0.34 SOLUTION OPHTHALMIC at 17:46

## 2022-01-27 RX ADMIN — INSULIN GLARGINE 7 UNIT(S): 100 INJECTION, SOLUTION SUBCUTANEOUS at 22:47

## 2022-01-27 RX ADMIN — MEROPENEM 100 MILLIGRAM(S): 1 INJECTION INTRAVENOUS at 06:08

## 2022-01-27 RX ADMIN — Medication 6 UNIT(S): at 09:32

## 2022-01-27 RX ADMIN — Medication 1 TABLET(S): at 17:46

## 2022-01-27 RX ADMIN — Medication 325 MILLIGRAM(S): at 11:44

## 2022-01-27 RX ADMIN — PANTOPRAZOLE SODIUM 40 MILLIGRAM(S): 20 TABLET, DELAYED RELEASE ORAL at 06:13

## 2022-01-27 RX ADMIN — LOSARTAN POTASSIUM 50 MILLIGRAM(S): 100 TABLET, FILM COATED ORAL at 06:12

## 2022-01-27 RX ADMIN — Medication 50 MILLIGRAM(S): at 06:13

## 2022-01-27 RX ADMIN — MEROPENEM 100 MILLIGRAM(S): 1 INJECTION INTRAVENOUS at 21:23

## 2022-01-27 RX ADMIN — MEROPENEM 100 MILLIGRAM(S): 1 INJECTION INTRAVENOUS at 14:19

## 2022-01-27 RX ADMIN — ENOXAPARIN SODIUM 30 MILLIGRAM(S): 100 INJECTION SUBCUTANEOUS at 11:44

## 2022-01-27 RX ADMIN — TIOTROPIUM BROMIDE 1 CAPSULE(S): 18 CAPSULE ORAL; RESPIRATORY (INHALATION) at 09:33

## 2022-01-27 RX ADMIN — Medication 500000 UNIT(S): at 06:08

## 2022-01-27 RX ADMIN — MIRTAZAPINE 7.5 MILLIGRAM(S): 45 TABLET, ORALLY DISINTEGRATING ORAL at 21:23

## 2022-01-27 RX ADMIN — Medication 1 TABLET(S): at 21:22

## 2022-01-27 RX ADMIN — Medication 500000 UNIT(S): at 11:45

## 2022-01-27 RX ADMIN — Medication 600 MILLIGRAM(S): at 17:46

## 2022-01-27 RX ADMIN — Medication 4: at 18:19

## 2022-01-27 RX ADMIN — Medication 1 SPRAY(S): at 17:45

## 2022-01-27 RX ADMIN — Medication 1 SPRAY(S): at 06:08

## 2022-01-27 RX ADMIN — Medication 1 TABLET(S): at 06:12

## 2022-01-27 RX ADMIN — BUDESONIDE AND FORMOTEROL FUMARATE DIHYDRATE 2 PUFF(S): 160; 4.5 AEROSOL RESPIRATORY (INHALATION) at 21:22

## 2022-01-27 RX ADMIN — BENZOCAINE AND MENTHOL 1 LOZENGE: 5; 1 LIQUID ORAL at 22:13

## 2022-01-27 RX ADMIN — Medication 7 UNIT(S): at 18:19

## 2022-01-27 RX ADMIN — Medication 4: at 09:32

## 2022-01-27 RX ADMIN — Medication 10: at 12:49

## 2022-01-27 RX ADMIN — Medication 6 UNIT(S): at 12:49

## 2022-01-27 RX ADMIN — BUDESONIDE AND FORMOTEROL FUMARATE DIHYDRATE 2 PUFF(S): 160; 4.5 AEROSOL RESPIRATORY (INHALATION) at 09:33

## 2022-01-27 RX ADMIN — KETOTIFEN FUMARATE 1 DROP(S): 0.34 SOLUTION OPHTHALMIC at 06:08

## 2022-01-27 RX ADMIN — Medication 37.5 MILLIGRAM(S): at 11:44

## 2022-01-27 RX ADMIN — MONTELUKAST 10 MILLIGRAM(S): 4 TABLET, CHEWABLE ORAL at 11:44

## 2022-01-27 RX ADMIN — Medication 600 MILLIGRAM(S): at 06:11

## 2022-01-27 RX ADMIN — FAMOTIDINE 40 MILLIGRAM(S): 10 INJECTION INTRAVENOUS at 21:23

## 2022-01-27 NOTE — PHARMACOTHERAPY INTERVENTION NOTE - COMMENTS
Spoke to pt with daughter on the phone for interpretation. Daughter states that pt frequently has symptomatic episodes of hypoglycemia and that she takes glucose tablets to treat it. Pt states that diet consists of bread with avocado, cornmeal, and lentils; but that she does not eat very much. Also states that she drinks Ensure, which makes her sugar rise up to 400s. Recommended to try Glucerna for meal supplementation instead of Ensure due to lower sugar content. Recommended glucagon prescription for frequent hypoglycemia, as well as Freestyle Tessie 2 CGM (per patient's request), pending insurance coverage. Pt educated on insulin pen administration, hypoglycemia and treatment, healthier food choices, and when to check BG, pt with good return demo and verbalized understanding. Pt encouraged for outpt f/u with medicine and endocrine. Patient preferred her daughter translate (pt dialed her phone). Pt educated on A1c, basal/bolus insulin pen administration, hypoglycemia and treatment, healthier food choices, and when to check BG; pt performed return demonstration with insulin pen (just reminded her to hold dose for 10 seconds) and verbalized understanding. Daughter states the pt's  and son often inject the patient's insulin (sometimes the patient does it herself) and they use a correction scale from the physician (pre-meal ranges from 5 to 9 units). Daughter states that pt frequently has symptomatic episodes of hypoglycemia and that she takes glucose tablets to treat it. Pt states that diet consists of bread with avocado, cornmeal, and lentils; but that she does not eat very much. Also states that she drinks Ensure, which makes her sugar rise up to 400s. Recommended to try Glucerna (currently at bedside) for meal supplementation instead of Ensure due to lower sugar content. Recommended glucagon prescription for frequent hypoglycemia, as well as Freestyle Tessie 2 CGM (per patient's request), pending insurance coverage. Pt encouraged for outpt f/u with medicine and endocrine. Per pt's pharmacy, her insurance covers Lantus Pens and Novolog Pens (last picked up in December).

## 2022-01-27 NOTE — DIETITIAN NUTRITION RISK NOTIFICATION - TREATMENT: THE FOLLOWING DIET HAS BEEN RECOMMENDED
Diet, Pureed:   Consistent Carbohydrate {Evening Snack} (CSTCHOSN)  DASH/TLC {Sodium & Cholesterol Restricted} (DASH)  Supplement Feeding Modality:  Oral  Glucerna Shake Cans or Servings Per Day:  1       Frequency:  Two Times a day (01-25-22 @ 14:07) [Active]

## 2022-01-27 NOTE — PROGRESS NOTE ADULT - ASSESSMENT
Patient is a a 67 yo F w/ Bronchiectasis, chronic hypercapnic/hypoxemic respiratory failure (2 to 5L O2/IVAPS - RR 18, EPAP 5, PS 15-25, average TV is 443), Liver Cirrohsis with prior CBD stricture s/p dilation and HCV ab+ who presents with fevers, productive sputum and wheezing, found to be COVID positive.     #Acute on Chronic hypoxemic/hypercapnic respiratory failure  #Acute bronchiectasis flare 2/2 bacterial process superimposed by COVID infection  - CXR 1/21 showed diffuse bilateral coarse reticular markings grossly unchanged from prior CXR  - Duonebs and ATC/3% Saline q6h  - aerobika prn for airway clearance  - Decadron 6mg PO daily x7 days as patient already took Pred 40mg x 3 days to end 1/28  - completed course of remdesivir  - appreciate ID recs  - continue meropenem while inpatient  - continue levaquin for 2 week course (can be completed outpatient)  - supplemental O2 to maintain SpO2 > 88%, remains on home 2L NC  - NIPPV QHS: AVAPS RR 18, EPAP 5, Min IP 15, Max IP 25,   - C/w Mirtazapine and Venlafaxine for anxiety    #Oropharyngeal candidiasis  - start nystatin swish and swallow qid  - magic mouthwash/benzocaine spray or lozenges for sore throat    Jose De Jesus Mead PGY-6  Pulmonary/Critical Care Fellow  Pager: [phone] ([facility]) [phone] (NS)  Pulmonary Spectra #[phone] (NS) / [phone] ([facility])

## 2022-01-27 NOTE — DIETITIAN INITIAL EVALUATION ADULT. - DIET TYPE
Glucerna Therapeutic Nutrition 8oz. 2x daily (~440 Kcals, ~20 gm Protein)/consistent carbohydrate (no snacks)/pureed/supplement (specify)

## 2022-01-27 NOTE — PROGRESS NOTE ADULT - SUBJECTIVE AND OBJECTIVE BOX
Follow Up:  COVID, bronchiectasis exacerbation    Interval History: pt afebrile, improved cough and now is only dry cough    ROS:      All other systems negative    Constitutional: no fever, no chills  Cardiovascular:  no chest pain, no palpitation  Respiratory:  no SOB, improved cough and is mostly dry  GI:  no abd pain, no vomiting, no diarrhea  urinary: no dysuria, no hematuria, no flank pain  musculoskeletal:  no joint pain, no joint swelling  skin:  no rash  neurology:  no headache, no seizure        Allergies  No Known Allergies        ANTIMICROBIALS:  levoFLOXacin  Tablet 500 every 24 hours  meropenem  IVPB 1000 every 8 hours  nystatin    Suspension [id number] four times a day      OTHER MEDS:  acetaminophen     Tablet .. 650 milliGRAM(s) Oral every 6 hours PRN  ALBUTerol    90 MICROgram(s) HFA Inhaler 2 Puff(s) Inhalation every 6 hours PRN  benzocaine 15 mG/menthol 3.6 mG Lozenge 1 Lozenge Oral every 6 hours PRN  budesonide 160 MICROgram(s)/formoterol 4.5 MICROgram(s) Inhaler 2 Puff(s) Inhalation two times a day  calcium carbonate 1250 mG  + Vitamin D (OsCal 500 + D) 1 Tablet(s) Oral two times a day  dexAMETHasone  Injectable 6 milliGRAM(s) IV Push daily  dextrose 40% Gel 15 Gram(s) Oral once  dextrose 5%. 1000 milliLiter(s) IV Continuous <Continuous>  dextrose 5%. 1000 milliLiter(s) IV Continuous <Continuous>  dextrose 50% Injectable 25 Gram(s) IV Push once  dextrose 50% Injectable 12.5 Gram(s) IV Push once  dextrose 50% Injectable 25 Gram(s) IV Push once  enoxaparin Injectable 30 milliGRAM(s) SubCutaneous daily  famotidine    Tablet 40 milliGRAM(s) Oral at bedtime  ferrous    sulfate 325 milliGRAM(s) Oral daily  fluticasone propionate 50 MICROgram(s)/spray Nasal Spray 1 Spray(s) Both Nostrils two times a day  glucagon  Injectable 1 milliGRAM(s) IntraMuscular once  guaiFENesin  milliGRAM(s) Oral every 12 hours  hydrocortisone hemorrhoidal Suppository 1 Suppository(s) Rectal two times a day PRN  insulin glargine Injectable (LANTUS) 5 Unit(s) SubCutaneous at bedtime  insulin lispro (ADMELOG) corrective regimen sliding scale   SubCutaneous three times a day before meals  insulin lispro (ADMELOG) corrective regimen sliding scale   SubCutaneous at bedtime  insulin lispro Injectable (ADMELOG) 6 Unit(s) SubCutaneous three times a day before meals  ketotifen 0.025% Ophthalmic Solution 1 Drop(s) Both EYES two times a day  losartan 50 milliGRAM(s) Oral daily  metoprolol succinate ER 50 milliGRAM(s) Oral daily  mirtazapine 7.5 milliGRAM(s) Oral at bedtime  montelukast 10 milliGRAM(s) Oral daily  pantoprazole    Tablet 40 milliGRAM(s) Oral before breakfast  simethicone 80 milliGRAM(s) Chew every 8 hours PRN  tiotropium 18 MICROgram(s) Capsule 1 Capsule(s) Inhalation daily  venlafaxine XR. 37.5 milliGRAM(s) Oral daily      Vital Signs Last 24 Hrs  T(C): 36.4 (27 Jan 2022 04:58), Max: 36.9 (26 Jan 2022 14:09)  T(F): 97.5 (27 Jan 2022 04:58), Max: 98.4 (26 Jan 2022 14:09)  HR: 57 (27 Jan 2022 06:08) (57 - 78)  BP: 122/65 (27 Jan 2022 06:08) (120/58 - 141/76)  BP(mean): --  RR: 18 (27 Jan 2022 04:58) (18 - 18)  SpO2: 99% (27 Jan 2022 04:58) (96% - 100%)    Physical Exam:  General:    NAD, non toxic  Cardio:    regular S1,S2  Respiratory:  comfortable on NC  abd:   soft, BS +, not tender  :     no CVAT, no suprapubic tenderness, no francisco  Musculoskeletal : no joint swelling, no edema  Skin:    no rash  vascular: no phlebitis                            11.9   7.76  )-----------( 145      ( 27 Jan 2022 07:45 )             38.4       01-27    139  |  101  |  18  ----------------------------<  176<H>  4.1   |  32<H>  |  0.33<L>    Ca    8.7      27 Jan 2022 07:45  Phos  2.4     01-26  Mg     1.80     01-26    TPro  5.3<L>  /  Alb  2.5<L>  /  TBili  0.2  /  DBili  <0.2  /  AST  25  /  ALT  28  /  AlkPhos  130<H>  01-27          MICROBIOLOGY:  v  .Sputum  01-21-22   Few Pseudomonas aeruginosa  Few Klebsiella pneumoniae ESBL  Rare Elizabethkingia meningoseptica  Few Mixed upper respiratory prashanth  --  Pseudomonas aeruginosa  Klebsiella pneumoniae ESBL  Elizabethkingia meningoseptica      Clean Catch Clean Catch (Midstream)  01-21-22   No growth  --  --      .Blood Blood-Peripheral  01-21-22   No Growth Final  --  --      .Blood Blood-Peripheral  01-21-22   No Growth Final  --  --          Rapid RVP Result: Detected (01-21 @ 15:08)        RADIOLOGY:  Images independently visualized and reviewed personally, findings as below  < from: Xray Chest 1 View- PORTABLE-Urgent (01.21.22 @ 14:32) >  IMPRESSION:    No radiographic evidence of pneumonia.    < end of copied text >  < from: CT Angio Chest PE Protocol w/ IV Cont (07.14.21 @ 18:24) >  IMPRESSION:  Bronchial dilatation and bronchial wall thickening, consistent with bronchiectasis as on the prior study.    Previously visualized left lower lobe peripheral nodular consolidations and adjacent cluster of tree-in-bud opacities likely of infectious etiology have mildly improved since June 30, 2021. No new opacities.    No evidence of pulmonary embolism.      < end of copied text >

## 2022-01-27 NOTE — PROGRESS NOTE ADULT - SUBJECTIVE AND OBJECTIVE BOX
INCOMPLETE    SUBJECTIVE/OVERNIGHT EVENTS:  No overnight events      14 point ROS negative except as noted above          OBJECTIVE:  ICU Vital Signs Last 24 Hrs  T(C): 36.4 (27 Jan 2022 04:58), Max: 36.9 (26 Jan 2022 14:09)  T(F): 97.5 (27 Jan 2022 04:58), Max: 98.4 (26 Jan 2022 14:09)  HR: 57 (27 Jan 2022 06:08) (57 - 78)  BP: 122/65 (27 Jan 2022 06:08) (120/58 - 141/76)  BP(mean): --  ABP: --  ABP(mean): --  RR: 18 (27 Jan 2022 04:58) (18 - 18)  SpO2: 99% (27 Jan 2022 04:58) (96% - 100%)    Mode: standby    CAPILLARY BLOOD GLUCOSE      POCT Blood Glucose.: 238 mg/dL (27 Jan 2022 09:10)        PHYSICAL EXAM:  General: awake and alert, frail and cachectic appearing female lying in bed  HEENT: NC/AT, EOMI b/l, conjunctiva normal, MMM, oropharyngeal thrush noted  Lymph Nodes: no cervical LAD  Neck: supple, full range of motion  Respiratory: rhonchorous breath sounds b/l improved, appears comfortable on NC, no conversational dyspnea or accessory muscle use  Cardiovascular: S1 S2 present, RRR, no m/r/g  Abdomen: soft, NT/ND, +BS  Extremities: no c/c/e  Skin: no rashes or lesions noted  Neurological: AAOx3, no focal deficits  Psychiatry: calm, cooperative    LINES:     MEDICATIONS  (STANDING):  budesonide 160 MICROgram(s)/formoterol 4.5 MICROgram(s) Inhaler 2 Puff(s) Inhalation two times a day  calcium carbonate 1250 mG  + Vitamin D (OsCal 500 + D) 1 Tablet(s) Oral two times a day  dexAMETHasone  Injectable 6 milliGRAM(s) IV Push daily  dextrose 40% Gel 15 Gram(s) Oral once  dextrose 5%. 1000 milliLiter(s) (50 mL/Hr) IV Continuous <Continuous>  dextrose 5%. 1000 milliLiter(s) (100 mL/Hr) IV Continuous <Continuous>  dextrose 50% Injectable 25 Gram(s) IV Push once  dextrose 50% Injectable 12.5 Gram(s) IV Push once  dextrose 50% Injectable 25 Gram(s) IV Push once  enoxaparin Injectable 30 milliGRAM(s) SubCutaneous daily  famotidine    Tablet 40 milliGRAM(s) Oral at bedtime  ferrous    sulfate 325 milliGRAM(s) Oral daily  fluticasone propionate 50 MICROgram(s)/spray Nasal Spray 1 Spray(s) Both Nostrils two times a day  glucagon  Injectable 1 milliGRAM(s) IntraMuscular once  guaiFENesin  milliGRAM(s) Oral every 12 hours  insulin glargine Injectable (LANTUS) 8 Unit(s) SubCutaneous at bedtime  insulin lispro (ADMELOG) corrective regimen sliding scale   SubCutaneous three times a day before meals  insulin lispro (ADMELOG) corrective regimen sliding scale   SubCutaneous at bedtime  insulin lispro Injectable (ADMELOG) 6 Unit(s) SubCutaneous three times a day before meals  ketotifen 0.025% Ophthalmic Solution 1 Drop(s) Both EYES two times a day  levoFLOXacin  Tablet 500 milliGRAM(s) Oral every 24 hours  losartan 50 milliGRAM(s) Oral daily  meropenem  IVPB 1000 milliGRAM(s) IV Intermittent every 8 hours  metoprolol succinate ER 50 milliGRAM(s) Oral daily  mirtazapine 7.5 milliGRAM(s) Oral at bedtime  montelukast 10 milliGRAM(s) Oral daily  pantoprazole    Tablet 40 milliGRAM(s) Oral before breakfast  tiotropium 18 MICROgram(s) Capsule 1 Capsule(s) Inhalation daily  venlafaxine XR. 37.5 milliGRAM(s) Oral daily    MEDICATIONS  (PRN):  acetaminophen     Tablet .. 650 milliGRAM(s) Oral every 6 hours PRN Temp greater or equal to 38C (100.4F), Mild Pain (1 - 3), Moderate Pain (4 - 6)  ALBUTerol    90 MICROgram(s) HFA Inhaler 2 Puff(s) Inhalation every 6 hours PRN Shortness of Breath and/or Wheezing  benzocaine 15 mG/menthol 3.6 mG Lozenge 1 Lozenge Oral every 6 hours PRN Sore Throat  hydrocortisone hemorrhoidal Suppository 1 Suppository(s) Rectal two times a day PRN Rectal pain/hemorrhoids  simethicone 80 milliGRAM(s) Chew every 8 hours PRN Gas        LABS:                        11.9   7.76  )-----------( 145      ( 27 Jan 2022 07:45 )             38.4     Hgb Trend: 11.9<--, 12.1<--, 11.8<--, 11.7<--, 12.0<--  01-27    139  |  101  |  18  ----------------------------<  176<H>  4.1   |  32<H>  |  0.33<L>    Ca    8.7      27 Jan 2022 07:45  Phos  2.4     01-26  Mg     1.80     01-26    TPro  5.3<L>  /  Alb  2.5<L>  /  TBili  0.2  /  DBili  <0.2  /  AST  25  /  ALT  28  /  AlkPhos  130<H>  01-27    Creatinine Trend: 0.33<--, 0.31<--, 0.31<--, 0.35<--, 0.43<--, 0.37<--      MICROBIOLOGY:     Culture - Blood (collected 21 Jan 2022 21:13)  Source: .Blood Blood-Peripheral  Preliminary Report (22 Jan 2022 22:01):    No growth to date.    Culture - Blood (collected 21 Jan 2022 21:13)  Source: .Blood Blood-Peripheral  Preliminary Report (22 Jan 2022 22:01):    No growth to date.    Culture - Sputum, Cystic Fibrosis (collected 21 Jan 2022 16:52)  Source: .Sputum  Gram Stain (21 Jan 2022 22:05):    Numerous polymorphonuclear leukocytes per low power field    No squamous epithelial cells per low power field    Numerous Gram Negative Rods per oil power field  Preliminary Report (23 Jan 2022 22:14):    Few Pseudomonas aeruginosa    Few Klebsiella pneumoniae    Rare Elizabethkingia meningoseptica    Few Mixed upper respiratory prashanth    Culture - Urine (collected 21 Jan 2022 14:18)  Source: Clean Catch Clean Catch (Midstream)  Final Report (22 Jan 2022 12:37):    No growth        RADIOLOGY:  [ ] Reviewed and interpreted by me    PULMONARY FUNCTION TESTS:    EKG: SUBJECTIVE/OVERNIGHT EVENTS:  No overnight events  Coughing remains the same but sputum production improved  Still complains of sore throat  Denies f/c, SOB at rest, chest pain, pleuritic pain    14 point ROS negative except as noted above      OBJECTIVE:  ICU Vital Signs Last 24 Hrs  T(C): 36.4 (27 Jan 2022 04:58), Max: 36.9 (26 Jan 2022 14:09)  T(F): 97.5 (27 Jan 2022 04:58), Max: 98.4 (26 Jan 2022 14:09)  HR: 57 (27 Jan 2022 06:08) (57 - 78)  BP: 122/65 (27 Jan 2022 06:08) (120/58 - 141/76)  BP(mean): --  ABP: --  ABP(mean): --  RR: 18 (27 Jan 2022 04:58) (18 - 18)  SpO2: 99% (27 Jan 2022 04:58) (96% - 100%)    Mode: standby    CAPILLARY BLOOD GLUCOSE      POCT Blood Glucose.: 238 mg/dL (27 Jan 2022 09:10)        PHYSICAL EXAM:  General: awake and alert, frail and cachectic appearing female lying in bed  HEENT: NC/AT, EOMI b/l, conjunctiva normal, MMM, oropharyngeal thrush noted  Lymph Nodes: no cervical LAD  Neck: supple, full range of motion  Respiratory: rhonchorous breath sounds b/l improved, appears comfortable on NC, no conversational dyspnea or accessory muscle use  Cardiovascular: S1 S2 present, RRR, no m/r/g  Abdomen: soft, NT/ND, +BS  Extremities: no c/c/e  Skin: no rashes or lesions noted  Neurological: AAOx3, no focal deficits  Psychiatry: calm, cooperative    LINES:     MEDICATIONS  (STANDING):  budesonide 160 MICROgram(s)/formoterol 4.5 MICROgram(s) Inhaler 2 Puff(s) Inhalation two times a day  calcium carbonate 1250 mG  + Vitamin D (OsCal 500 + D) 1 Tablet(s) Oral two times a day  dexAMETHasone  Injectable 6 milliGRAM(s) IV Push daily  dextrose 40% Gel 15 Gram(s) Oral once  dextrose 5%. 1000 milliLiter(s) (50 mL/Hr) IV Continuous <Continuous>  dextrose 5%. 1000 milliLiter(s) (100 mL/Hr) IV Continuous <Continuous>  dextrose 50% Injectable 25 Gram(s) IV Push once  dextrose 50% Injectable 12.5 Gram(s) IV Push once  dextrose 50% Injectable 25 Gram(s) IV Push once  enoxaparin Injectable 30 milliGRAM(s) SubCutaneous daily  famotidine    Tablet 40 milliGRAM(s) Oral at bedtime  ferrous    sulfate 325 milliGRAM(s) Oral daily  fluticasone propionate 50 MICROgram(s)/spray Nasal Spray 1 Spray(s) Both Nostrils two times a day  glucagon  Injectable 1 milliGRAM(s) IntraMuscular once  guaiFENesin  milliGRAM(s) Oral every 12 hours  insulin glargine Injectable (LANTUS) 8 Unit(s) SubCutaneous at bedtime  insulin lispro (ADMELOG) corrective regimen sliding scale   SubCutaneous three times a day before meals  insulin lispro (ADMELOG) corrective regimen sliding scale   SubCutaneous at bedtime  insulin lispro Injectable (ADMELOG) 6 Unit(s) SubCutaneous three times a day before meals  ketotifen 0.025% Ophthalmic Solution 1 Drop(s) Both EYES two times a day  levoFLOXacin  Tablet 500 milliGRAM(s) Oral every 24 hours  losartan 50 milliGRAM(s) Oral daily  meropenem  IVPB 1000 milliGRAM(s) IV Intermittent every 8 hours  metoprolol succinate ER 50 milliGRAM(s) Oral daily  mirtazapine 7.5 milliGRAM(s) Oral at bedtime  montelukast 10 milliGRAM(s) Oral daily  pantoprazole    Tablet 40 milliGRAM(s) Oral before breakfast  tiotropium 18 MICROgram(s) Capsule 1 Capsule(s) Inhalation daily  venlafaxine XR. 37.5 milliGRAM(s) Oral daily    MEDICATIONS  (PRN):  acetaminophen     Tablet .. 650 milliGRAM(s) Oral every 6 hours PRN Temp greater or equal to 38C (100.4F), Mild Pain (1 - 3), Moderate Pain (4 - 6)  ALBUTerol    90 MICROgram(s) HFA Inhaler 2 Puff(s) Inhalation every 6 hours PRN Shortness of Breath and/or Wheezing  benzocaine 15 mG/menthol 3.6 mG Lozenge 1 Lozenge Oral every 6 hours PRN Sore Throat  hydrocortisone hemorrhoidal Suppository 1 Suppository(s) Rectal two times a day PRN Rectal pain/hemorrhoids  simethicone 80 milliGRAM(s) Chew every 8 hours PRN Gas        LABS:                        11.9   7.76  )-----------( 145      ( 27 Jan 2022 07:45 )             38.4     Hgb Trend: 11.9<--, 12.1<--, 11.8<--, 11.7<--, 12.0<--  01-27    139  |  101  |  18  ----------------------------<  176<H>  4.1   |  32<H>  |  0.33<L>    Ca    8.7      27 Jan 2022 07:45  Phos  2.4     01-26  Mg     1.80     01-26    TPro  5.3<L>  /  Alb  2.5<L>  /  TBili  0.2  /  DBili  <0.2  /  AST  25  /  ALT  28  /  AlkPhos  130<H>  01-27    Creatinine Trend: 0.33<--, 0.31<--, 0.31<--, 0.35<--, 0.43<--, 0.37<--      MICROBIOLOGY:     Culture - Blood (collected 21 Jan 2022 21:13)  Source: .Blood Blood-Peripheral  Preliminary Report (22 Jan 2022 22:01):    No growth to date.    Culture - Blood (collected 21 Jan 2022 21:13)  Source: .Blood Blood-Peripheral  Preliminary Report (22 Jan 2022 22:01):    No growth to date.    Culture - Sputum, Cystic Fibrosis (collected 21 Jan 2022 16:52)  Source: .Sputum  Gram Stain (21 Jan 2022 22:05):    Numerous polymorphonuclear leukocytes per low power field    No squamous epithelial cells per low power field    Numerous Gram Negative Rods per oil power field  Preliminary Report (23 Jan 2022 22:14):    Few Pseudomonas aeruginosa    Few Klebsiella pneumoniae    Rare Elizabethkingia meningoseptica    Few Mixed upper respiratory prashanth    Culture - Urine (collected 21 Jan 2022 14:18)  Source: Clean Catch Clean Catch (Midstream)  Final Report (22 Jan 2022 12:37):    No growth        RADIOLOGY:  [ ] Reviewed and interpreted by me    PULMONARY FUNCTION TESTS:    EKG:

## 2022-01-27 NOTE — PROGRESS NOTE ADULT - SUBJECTIVE AND OBJECTIVE BOX
[facility] Medicine  Pager [phone]    Patient is a 66y old  Female who presents with a chief complaint of SOB (27 Jan 2022 09:37)      INTERVAL HPI/OVERNIGHT EVENTS:    MEDICATIONS  (STANDING):  budesonide 160 MICROgram(s)/formoterol 4.5 MICROgram(s) Inhaler 2 Puff(s) Inhalation two times a day  calcium carbonate 1250 mG  + Vitamin D (OsCal 500 + D) 1 Tablet(s) Oral two times a day  dexAMETHasone  Injectable 6 milliGRAM(s) IV Push daily  dextrose 40% Gel 15 Gram(s) Oral once  dextrose 5%. 1000 milliLiter(s) (50 mL/Hr) IV Continuous <Continuous>  dextrose 5%. 1000 milliLiter(s) (100 mL/Hr) IV Continuous <Continuous>  dextrose 50% Injectable 25 Gram(s) IV Push once  dextrose 50% Injectable 12.5 Gram(s) IV Push once  dextrose 50% Injectable 25 Gram(s) IV Push once  enoxaparin Injectable 30 milliGRAM(s) SubCutaneous daily  famotidine    Tablet 40 milliGRAM(s) Oral at bedtime  ferrous    sulfate 325 milliGRAM(s) Oral daily  fluticasone propionate 50 MICROgram(s)/spray Nasal Spray 1 Spray(s) Both Nostrils two times a day  glucagon  Injectable 1 milliGRAM(s) IntraMuscular once  guaiFENesin  milliGRAM(s) Oral every 12 hours  insulin glargine Injectable (LANTUS) 5 Unit(s) SubCutaneous at bedtime  insulin lispro (ADMELOG) corrective regimen sliding scale   SubCutaneous three times a day before meals  insulin lispro (ADMELOG) corrective regimen sliding scale   SubCutaneous at bedtime  insulin lispro Injectable (ADMELOG) 6 Unit(s) SubCutaneous three times a day before meals  ketotifen 0.025% Ophthalmic Solution 1 Drop(s) Both EYES two times a day  levoFLOXacin  Tablet 500 milliGRAM(s) Oral every 24 hours  losartan 50 milliGRAM(s) Oral daily  meropenem  IVPB 1000 milliGRAM(s) IV Intermittent every 8 hours  metoprolol succinate ER 50 milliGRAM(s) Oral daily  mirtazapine 7.5 milliGRAM(s) Oral at bedtime  montelukast 10 milliGRAM(s) Oral daily  nystatin    Suspension 592082 Unit(s) Oral four times a day  pantoprazole    Tablet 40 milliGRAM(s) Oral before breakfast  tiotropium 18 MICROgram(s) Capsule 1 Capsule(s) Inhalation daily  venlafaxine XR. 37.5 milliGRAM(s) Oral daily    MEDICATIONS  (PRN):  acetaminophen     Tablet .. 650 milliGRAM(s) Oral every 6 hours PRN Temp greater or equal to 38C (100.4F), Mild Pain (1 - 3), Moderate Pain (4 - 6)  ALBUTerol    90 MICROgram(s) HFA Inhaler 2 Puff(s) Inhalation every 6 hours PRN Shortness of Breath and/or Wheezing  benzocaine 15 mG/menthol 3.6 mG Lozenge 1 Lozenge Oral every 6 hours PRN Sore Throat  hydrocortisone hemorrhoidal Suppository 1 Suppository(s) Rectal two times a day PRN Rectal pain/hemorrhoids  simethicone 80 milliGRAM(s) Chew every 8 hours PRN Gas      Allergies    No Known Allergies    Intolerances        REVIEW OF SYSTEMS:  Please see interval HPI:    Vital Signs Last 24 Hrs  T(C): 36.4 (27 Jan 2022 04:58), Max: 36.9 (26 Jan 2022 14:09)  T(F): 97.5 (27 Jan 2022 04:58), Max: 98.4 (26 Jan 2022 14:09)  HR: 57 (27 Jan 2022 06:08) (57 - 78)  BP: 122/65 (27 Jan 2022 06:08) (120/58 - 141/76)  BP(mean): --  RR: 18 (27 Jan 2022 04:58) (18 - 18)  SpO2: 99% (27 Jan 2022 04:58) (96% - 100%)  I&O's Detail        PHYSICAL EXAM:  GENERAL:   HEAD:    EYES:   ENMT:   NECK:   NERVOUS SYSTEM:    CHEST/LUNG:   HEART:   ABDOMEN:   EXTREMITIES:    LYMPH:   SKIN:     LABS:                        11.9   7.76  )-----------( 145      ( 27 Jan 2022 07:45 )             38.4     27 Jan 2022 07:45    139    |  101    |  18     ----------------------------<  176    4.1     |  32     |  0.33     Ca    8.7        27 Jan 2022 07:45    TPro  5.3    /  Alb  2.5    /  TBili  0.2    /  DBili  <0.2   /  AST  25     /  ALT  28     /  AlkPhos  130    27 Jan 2022 07:45      CAPILLARY BLOOD GLUCOSE      POCT Blood Glucose.: 380 mg/dL (27 Jan 2022 12:47)  POCT Blood Glucose.: 238 mg/dL (27 Jan 2022 09:10)  POCT Blood Glucose.: 134 mg/dL (26 Jan 2022 22:58)  POCT Blood Glucose.: 113 mg/dL (26 Jan 2022 22:00)  POCT Blood Glucose.: 213 mg/dL (26 Jan 2022 17:46)    BLOOD CULTURE    RADIOLOGY & ADDITIONAL TESTS:    Imaging Personally Reviewed:  [ ] YES     Consultant(s) Notes Reviewed:      Care Discussed with Consultants/Other Providers: [facility] Medicine  Pager [phone]    Patient is a 66y old  Female who presents with a chief complaint of SOB (27 Jan 2022 09:37)      INTERVAL HPI/OVERNIGHT EVENTS:  No hypoglycemia noted overnight. Leandro interpretation provided by tabitha Roberts per patient request. Patient eating more, mouth discomfort improving s/p nystatin (patient shows tongue, looks better). Still reporting loose BMs. Breathing is stable, overall feeling better today than yesterday. No other questions/concerns at this time. Tabitha Roberts also in agreement w/ plan.     MEDICATIONS  (STANDING):  budesonide 160 MICROgram(s)/formoterol 4.5 MICROgram(s) Inhaler 2 Puff(s) Inhalation two times a day  calcium carbonate 1250 mG  + Vitamin D (OsCal 500 + D) 1 Tablet(s) Oral two times a day  dexAMETHasone  Injectable 6 milliGRAM(s) IV Push daily  dextrose 40% Gel 15 Gram(s) Oral once  dextrose 5%. 1000 milliLiter(s) (50 mL/Hr) IV Continuous <Continuous>  dextrose 5%. 1000 milliLiter(s) (100 mL/Hr) IV Continuous <Continuous>  dextrose 50% Injectable 25 Gram(s) IV Push once  dextrose 50% Injectable 12.5 Gram(s) IV Push once  dextrose 50% Injectable 25 Gram(s) IV Push once  enoxaparin Injectable 30 milliGRAM(s) SubCutaneous daily  famotidine    Tablet 40 milliGRAM(s) Oral at bedtime  ferrous    sulfate 325 milliGRAM(s) Oral daily  fluticasone propionate 50 MICROgram(s)/spray Nasal Spray 1 Spray(s) Both Nostrils two times a day  glucagon  Injectable 1 milliGRAM(s) IntraMuscular once  guaiFENesin  milliGRAM(s) Oral every 12 hours  insulin glargine Injectable (LANTUS) 5 Unit(s) SubCutaneous at bedtime  insulin lispro (ADMELOG) corrective regimen sliding scale   SubCutaneous three times a day before meals  insulin lispro (ADMELOG) corrective regimen sliding scale   SubCutaneous at bedtime  insulin lispro Injectable (ADMELOG) 6 Unit(s) SubCutaneous three times a day before meals  ketotifen 0.025% Ophthalmic Solution 1 Drop(s) Both EYES two times a day  levoFLOXacin  Tablet 500 milliGRAM(s) Oral every 24 hours  losartan 50 milliGRAM(s) Oral daily  meropenem  IVPB 1000 milliGRAM(s) IV Intermittent every 8 hours  metoprolol succinate ER 50 milliGRAM(s) Oral daily  mirtazapine 7.5 milliGRAM(s) Oral at bedtime  montelukast 10 milliGRAM(s) Oral daily  nystatin    Suspension [id number] Unit(s) Oral four times a day  pantoprazole    Tablet 40 milliGRAM(s) Oral before breakfast  tiotropium 18 MICROgram(s) Capsule 1 Capsule(s) Inhalation daily  venlafaxine XR. 37.5 milliGRAM(s) Oral daily    MEDICATIONS  (PRN):  acetaminophen     Tablet .. 650 milliGRAM(s) Oral every 6 hours PRN Temp greater or equal to 38C (100.4F), Mild Pain (1 - 3), Moderate Pain (4 - 6)  ALBUTerol    90 MICROgram(s) HFA Inhaler 2 Puff(s) Inhalation every 6 hours PRN Shortness of Breath and/or Wheezing  benzocaine 15 mG/menthol 3.6 mG Lozenge 1 Lozenge Oral every 6 hours PRN Sore Throat  hydrocortisone hemorrhoidal Suppository 1 Suppository(s) Rectal two times a day PRN Rectal pain/hemorrhoids  simethicone 80 milliGRAM(s) Chew every 8 hours PRN Gas      Allergies  No Known Allergies    Intolerances    REVIEW OF SYSTEMS:  Please see interval HPI:    Vital Signs Last 24 Hrs  T(C): 36.4 (27 Jan 2022 04:58), Max: 36.9 (26 Jan 2022 14:09)  T(F): 97.5 (27 Jan 2022 04:58), Max: 98.4 (26 Jan 2022 14:09)  HR: 57 (27 Jan 2022 06:08) (57 - 78)  BP: 122/65 (27 Jan 2022 06:08) (120/58 - 141/76)  RR: 18 (27 Jan 2022 04:58) (18 - 18)  SpO2: 99% (27 Jan 2022 04:58) (96% - 100%)  I&O's Detail    PHYSICAL EXAM:  GENERAL: NAD, sitting in bed, +less frequent coughing  HEAD:  NC/AT  EYES: EOMI, clear sclera/conjunctiva  ENMT: MMM, thrush appears to improving  NECK: supple  NERVOUS SYSTEM: moving extremities, non-focal   CHEST/LUNG: NC in place, no respiratory distress on supplemental O2, coarse BS  HEART: S1S2 RRR  ABDOMEN: soft, non-tender  EXTREMITIES:  no c/c/e      LABS:                        11.9   7.76  )-----------( 145      ( 27 Jan 2022 07:45 )             38.4     27 Jan 2022 07:45    139    |  101    |  18     ----------------------------<  176    4.1     |  32     |  0.33     Ca    8.7        27 Jan 2022 07:45    TPro  5.3    /  Alb  2.5    /  TBili  0.2    /  DBili  <0.2   /  AST  25     /  ALT  28     /  AlkPhos  130    27 Jan 2022 07:45      CAPILLARY BLOOD GLUCOSE  POCT Blood Glucose.: 380 mg/dL (27 Jan 2022 12:47)  POCT Blood Glucose.: 238 mg/dL (27 Jan 2022 09:10)  POCT Blood Glucose.: 134 mg/dL (26 Jan 2022 22:58)  POCT Blood Glucose.: 113 mg/dL (26 Jan 2022 22:00)  POCT Blood Glucose.: 213 mg/dL (26 Jan 2022 17:46)    BLOOD CULTURE    RADIOLOGY & ADDITIONAL TESTS:    Imaging Personally Reviewed:  [ ] YES     Consultant(s) Notes Reviewed:  Endocrine, ID, Pulm    Care Discussed with Consultants/Other Providers: Endocrine Sabrina re: FS improving (perhaps in setting of better PO intake), plan to increase regimen, plan for transitions of care pharmacist visit as well,  Ambulatory

## 2022-01-27 NOTE — DIETITIAN INITIAL EVALUATION ADULT. - OTHER INFO
Pt 65 yo female with diffuse cystic bronchiectasis and primary ciliary dyskinesia, on chronic home O2 and AVAPS qhs, ABPA, HTN, GERD, DM2, depression, liver cirrhosis and CBD stricture; Pt found COVID19 (+)ve per chart     At time of visit, Pt awake, alert; Pt speaks Leandro;  phone service used (ID# [id number]). Per Pt, her appetite not that well; Pt likes to drink PO supplement: Glucerna Shake reported. Pt C/O sore throat; but with Pureed consistency diet/meal option Pt swallows better reported. No report of nausea, vomiting or diarrhea @ this time; but Pt C/O abdominal gas. Rec to add anti-acid medication per MD discretion. Per Pt, her height: ~64" & her usual weight: ~42 kg; Pt with unintended weight loss of ~2kg, but could not confirm the time frame.     Of note, Pt's HbA1c level 9.4% (1/21). Consistent Carbohydrate diet discussed with Pt including better food choices, foods to avoid; written materials on therapeutic diet restrictions also provided. RDN answered concerns related to diet; Pt verbalized understanding. Will recommend Pt to follow up with appropriate RDN upon discharge for the purposes of long-term nutrition evaluation and diet education. RDN remains available, Pt made aware.

## 2022-01-27 NOTE — DIETITIAN INITIAL EVALUATION ADULT. - PERTINENT LABORATORY DATA
(1/27)  L, Creat 0.33 L, Glu 176 H, Albumin 2.5 L,  H;       (1/26) phosphorus 2.4 L;       (1/21) HbA1c 9.4% H;

## 2022-01-27 NOTE — PROGRESS NOTE ADULT - ASSESSMENT
67 y/o Leandro speaking Female, with a PmHx of Diffuse Cystic Bronchiectasis and Primary Ciliary Dyskinesia on 2L NC QD and AVAPS QHS, Chronic Sinusitis, ABPA, HTN, GERD, IDDM2, Depression, Nodular Liver and CBD stricture s/p PTC  and admissions for PNA in past, presented to the [facility] ED for SOB  pt is unvaccinated currently being treated for COVID. Endocrine called today for DM assistance, currently on Dexamethasone and Remdesvir     1. DM 2  HbA1c 9%  Home Regimen: Lantus 14 units qHS, Novolog 5-8 units TID (uses correctional scale per son)  Given her body habitus/low BMI, recommend to sent DM 1/PIPPA antibodies and cpeptide  While inpatient, on Dexamethasone 6 mg for COVID until 1/28    While inpatient:   BG target 100-180 mg/dl  Increase Lantus to 7 units SQ qHS  Increase Admelog to 7 units SQ TID before meals (Hold if NPO/not eating meal)   Continue Admelog moderate dose scale before meals, low dose at bedtime  Check BG before meals and bedtime  Consistent carbohydrate diet  RD consult completed   Hypoglycemia protocol   DM Education: Successful insulin PEN return demonstration done with [facility] pharmacist 1/27, patient's family also assists her with injections at home    Discharge Plan:   Resume basal/bolus insulin PENS (Lantus and Novolog), dose TBD  Please send Freestyle Tessie 2 CGM to assess insurance coverage   Ensure she has glucometer, glucose test strips, lancets, alcohol swabs and insulin PEN needles  Would also recommend glucagon kit for home treatment of hypoglycemia - or can use Gvoke, Baqsimi    Followup with PCP, optho, podiatry   For endocrine followup, can schedule at [facility]: [phone] [facility] [id number], [phone]    2. Steroid induced hyperglycemia  Dexamethasone 6 mg daily until 1/28, please notify endocrine with any changes in steroid plan     3. HTN  BP management as per primary team in the setting of COVID    4. HLD  LDL 61  Consider statin given DM as CV risk factor if no contraindications     Sabrina Plaza  Nurse Practitioner  Division of Endocrinology & Diabetes  In house pager #[phone]/long range pager #[phone]    If before 9AM or after 6PM, or on weekends/holidays, please call endocrine answering service for assistance ([phone]).  For nonurgent matters email [email or web address] for assistance.

## 2022-01-27 NOTE — PROGRESS NOTE ADULT - ASSESSMENT
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), previous admissions for  Pseudomonas PNA but last pseudomonas was not CRE, now sent  by pulmonary for fever and cough, all family members had COVID 2-3 weeks ago but she stated to get dyspnea about 2 weeks ago and now worse  here afebrile, WBC: 12  COVID positive, legionella negative  blood cx negative, sputum cx with pseudomonas, klebsiella and elizabethkingia   CXR: no evidence of bacterial pneumonia     cystic bronchiectasis and ciliary dyskinesia  on home O2 now with fever, cough due to COVID (unvaccinated), ?bronchiectasis exacerbation  sputum cx polymicrobial with pseudomonas, klebsiella and elizabethkingia which is more s/o colonization, no consolidation on CXR  oropharyngeal candidosis    * the elizabethkingia is R to geoff, so  levo 500 qd was added 1/25  * c/w geoff, while in the hospital, started 1/21, now day 7 but on discharge will only do levofloxacin (sensitive to all organisms), likely total 2 weeks  * c/w nystatin  * s/p remdesivir now on dexa for up to a 10 day course  * monitor the O2 sat    The above assessment and plan was discussed with the primary team     Urmila Rose MD  Pager [phone]  After 5pm and on weekends call [phone]

## 2022-01-27 NOTE — PROGRESS NOTE ADULT - ATTENDING COMMENTS
67 y/o F with PMH as above here with cough and increased sputum production, found to have COVID and an acute bronchiectasis exacerbation.  Sputum cultures growing pseudo, kleb and elizabethkingia, cont meropenem and LQ.  Respiratory wise she endorses improvement. Abx course as per ID. Cont R&D.  Airway clearance as above.  Cont Venlafaxine and Mirtazepine for anxiety. NIPPV qhs as above. Rest of plan as above.
67 y/o F with PMH as above here with cough and increased sputum production, found to have COVID and an acute bronchiectasis exacerbation.  Sputum cultures growing pseudo, kleb and elizabethkingia, cont meropenem and added LQ today based on sensitivities. Abx course as per ID. Cont R&D.  Airway clearance as above.  Cont Venlafaxine and Mirtazepine for anxiety. NIPPV qhs as above.  Rest of plan as above.
65 y/o F with PMH as above here with cough and increased sputum production, found to have COVID and an acute bronchiectasis exacerbation.  Sputum cultures growing pseudo and kleb.  Cont with a course of abx.  Cont R&D.  Airway clearance as above.  Would restart home Venlafaxine as she appears anxious.  Rest of plan as above.
66 F with bronchiectasis in past with chronic hypercapnic respiratory failure here with productive cough and sputum found to have covid and possible bronchiectasis exacerbation.    # acute on chronic hypercapnic hypoxic respiratory failure  # bronchiectasis exacerbation  # covid 19  - c/w supplemental o2 as tolerated, avaps qhs and prn  - decadron and remdesivir  - c/w abx for bronchiectasis exacerbation, has pseudomonas in past, f/u sputum culture    # hemorrhoids  - patient c/o hemorrhoidal pain, consider ointment prn (states this helps her at home)
67 y/o F with PMH as above here with cough and increased sputum production, found to have COVID and an acute bronchiectasis exacerbation.  Sputum cultures growing pseudo, kleb and elizabethkingia, cont meropenem and LQ.  Respiratory wise she endorses improvement. Abx course as per ID. Cont R&D.  Airway clearance as above.  Cont Venlafaxine and Mirtazepine for anxiety. NIPPV qhs as above.  Oropharyngeal candida, can consider Nysatin as above. Rest of plan as above.

## 2022-01-27 NOTE — PROGRESS NOTE ADULT - SUBJECTIVE AND OBJECTIVE BOX
Chief Complaint: DM 2 with hyperglycemia, COVID+ on Dexamethasone    History: Patient seen at bedside. Patient declined  phone and dialed her son Alejandra ([phone]) for interpretation to [facility]  Patient reports her mouth/throat pain is feeling better and endorses eating breakfast this AM - FS now elevated  Remains on Dexamethasone 6 mg daily until 1/28  Patient seen by pharmacy liaison team and performed successful insulin PEN return demonstration  Was confirmed with her home pharmacy that Lantus and Novolog are covered by her current insurance (family previously reporting Basaglar/Admelog)  Patient/family requesting Freestyle Tessie 2 - endorsing hypoglycemia events at home    MEDICATIONS  (STANDING):  budesonide 160 MICROgram(s)/formoterol 4.5 MICROgram(s) Inhaler 2 Puff(s) Inhalation two times a day  calcium carbonate 1250 mG  + Vitamin D (OsCal 500 + D) 1 Tablet(s) Oral two times a day  dexAMETHasone  Injectable 6 milliGRAM(s) IV Push daily  dextrose 40% Gel 15 Gram(s) Oral once  dextrose 5%. 1000 milliLiter(s) (50 mL/Hr) IV Continuous <Continuous>  dextrose 5%. 1000 milliLiter(s) (100 mL/Hr) IV Continuous <Continuous>  dextrose 50% Injectable 25 Gram(s) IV Push once  dextrose 50% Injectable 25 Gram(s) IV Push once  dextrose 50% Injectable 12.5 Gram(s) IV Push once  enoxaparin Injectable 30 milliGRAM(s) SubCutaneous daily  famotidine    Tablet 40 milliGRAM(s) Oral at bedtime  ferrous    sulfate 325 milliGRAM(s) Oral daily  fluticasone propionate 50 MICROgram(s)/spray Nasal Spray 1 Spray(s) Both Nostrils two times a day  glucagon  Injectable 1 milliGRAM(s) IntraMuscular once  guaiFENesin  milliGRAM(s) Oral every 12 hours  insulin glargine Injectable (LANTUS) 5 Unit(s) SubCutaneous at bedtime  insulin lispro (ADMELOG) corrective regimen sliding scale   SubCutaneous at bedtime  insulin lispro (ADMELOG) corrective regimen sliding scale   SubCutaneous three times a day before meals  insulin lispro Injectable (ADMELOG) 6 Unit(s) SubCutaneous three times a day before meals  ketotifen 0.025% Ophthalmic Solution 1 Drop(s) Both EYES two times a day  levoFLOXacin  Tablet 500 milliGRAM(s) Oral every 24 hours  losartan 50 milliGRAM(s) Oral daily  meropenem  IVPB 1000 milliGRAM(s) IV Intermittent every 8 hours  metoprolol succinate ER 50 milliGRAM(s) Oral daily  mirtazapine 7.5 milliGRAM(s) Oral at bedtime  montelukast 10 milliGRAM(s) Oral daily  nystatin    Suspension 052424 Unit(s) Oral four times a day  pantoprazole    Tablet 40 milliGRAM(s) Oral before breakfast  tiotropium 18 MICROgram(s) Capsule 1 Capsule(s) Inhalation daily  venlafaxine XR. 37.5 milliGRAM(s) Oral daily    MEDICATIONS  (PRN):  acetaminophen     Tablet .. 650 milliGRAM(s) Oral every 6 hours PRN Temp greater or equal to 38C (100.4F), Mild Pain (1 - 3), Moderate Pain (4 - 6)  ALBUTerol    90 MICROgram(s) HFA Inhaler 2 Puff(s) Inhalation every 6 hours PRN Shortness of Breath and/or Wheezing  benzocaine 15 mG/menthol 3.6 mG Lozenge 1 Lozenge Oral every 6 hours PRN Sore Throat  hydrocortisone hemorrhoidal Suppository 1 Suppository(s) Rectal two times a day PRN Rectal pain/hemorrhoids  simethicone 80 milliGRAM(s) Chew every 8 hours PRN Gas    No Known Allergies    Review of Systems:  Cardiovascular: No chest pain  Respiratory: No SOB  GI: No nausea, vomiting  Endocrine: no hypoglycemia     PHYSICAL EXAM:  VITALS: T(C): 36.9 (01-27-22 @ 14:33)  T(F): 98.4 (01-27-22 @ 14:33), Max: 98.4 (01-27-22 @ 14:33)  HR: 80 (01-27-22 @ 14:33) (57 - 80)  BP: 122/63 (01-27-22 @ 14:33) (120/58 - 132/56)  RR:  (18 - 18)  SpO2:  (96% - 100%)  Wt(kg): --  GENERAL: NAD  EYES: No proptosis, no lid lag, anicteric  HEENT:  Atraumatic, Normocephalic, moist mucous membranes  RESPIRATORY: unlabored respirations     CAPILLARY BLOOD GLUCOSE    POCT Blood Glucose.: 380 mg/dL (27 Jan 2022 12:47)  POCT Blood Glucose.: 238 mg/dL (27 Jan 2022 09:10)  POCT Blood Glucose.: 134 mg/dL (26 Jan 2022 22:58)  POCT Blood Glucose.: 113 mg/dL (26 Jan 2022 22:00)  POCT Blood Glucose.: 213 mg/dL (26 Jan 2022 17:46)      01-27    139  |  101  |  18  ----------------------------<  176<H>  4.1   |  32<H>  |  0.33<L>    EGFR if : 136  EGFR if non : 117    Ca    8.7      01-27  Mg     1.80     01-26  Phos  2.4     01-26    TPro  5.3<L>  /  Alb  2.5<L>  /  TBili  0.2  /  DBili  <0.2  /  AST  25  /  ALT  28  /  AlkPhos  130<H>  01-27      Thyroid Function Tests:  01-21 @ 14:31 TSH 4.32 FreeT4 -- T3 -- Anti TPO -- Anti Thyroglobulin Ab -- TSI --      A1C with Estimated Average Glucose Result: 9.4 % (01-21-22 @ 14:26)  A1C with Estimated Average Glucose Result: 8.9 % (06-30-21 @ 07:28)  A1C with Estimated Average Glucose Result: 8.5 % (04-08-21 @ 07:36)  A1C with Estimated Average Glucose Result: 8.2 % (04-01-21 @ 15:25)    Diet, Pureed:   Consistent Carbohydrate Evening Snack (CSTCHOSN)  DASH/TLC Sodium & Cholesterol Restricted (DASH)  Supplement Feeding Modality:  Oral  Glucerna Shake Cans or Servings Per Day:  1       Frequency:  Two Times a day (01-25-22 @ 14:07)

## 2022-01-28 ENCOUNTER — TRANSCRIPTION ENCOUNTER (OUTPATIENT)
Age: 67
End: 2022-01-28

## 2022-01-28 PROBLEM — F41.9 ANXIETY DISORDER, UNSPECIFIED: Chronic | Status: ACTIVE | Noted: 2022-01-21

## 2022-01-28 PROCEDURE — 99232 SBSQ HOSP IP/OBS MODERATE 35: CPT

## 2022-01-28 PROCEDURE — 99233 SBSQ HOSP IP/OBS HIGH 50: CPT

## 2022-01-28 RX ORDER — VENLAFAXINE HCL 75 MG
1 CAPSULE, EXT RELEASE 24 HR ORAL
Qty: 0 | Refills: 0 | DISCHARGE
Start: 2022-01-28

## 2022-01-28 RX ORDER — NYSTATIN 500MM UNIT
5 POWDER (EA) MISCELLANEOUS
Qty: 200 | Refills: 0
Start: 2022-01-28 | End: 2022-02-06

## 2022-01-28 RX ORDER — LACTOBACILLUS ACIDOPHILUS 100MM CELL
1 CAPSULE ORAL
Qty: 14 | Refills: 0
Start: 2022-01-28 | End: 2022-02-10

## 2022-01-28 RX ORDER — GLUCAGON INJECTION, SOLUTION 0.5 MG/.1ML
3 INJECTION, SOLUTION SUBCUTANEOUS
Qty: 1 | Refills: 0
Start: 2022-01-28

## 2022-01-28 RX ADMIN — Medication 4: at 12:50

## 2022-01-28 RX ADMIN — Medication 500000 UNIT(S): at 11:49

## 2022-01-28 RX ADMIN — Medication 500000 UNIT(S): at 17:35

## 2022-01-28 RX ADMIN — PANTOPRAZOLE SODIUM 40 MILLIGRAM(S): 20 TABLET, DELAYED RELEASE ORAL at 06:06

## 2022-01-28 RX ADMIN — Medication 600 MILLIGRAM(S): at 17:35

## 2022-01-28 RX ADMIN — LOSARTAN POTASSIUM 50 MILLIGRAM(S): 100 TABLET, FILM COATED ORAL at 05:51

## 2022-01-28 RX ADMIN — Medication 2: at 09:16

## 2022-01-28 RX ADMIN — Medication 600 MILLIGRAM(S): at 05:52

## 2022-01-28 RX ADMIN — Medication 1 TABLET(S): at 05:52

## 2022-01-28 RX ADMIN — Medication 1 SPRAY(S): at 17:35

## 2022-01-28 RX ADMIN — Medication 7 UNIT(S): at 09:16

## 2022-01-28 RX ADMIN — Medication 1 SPRAY(S): at 05:53

## 2022-01-28 RX ADMIN — Medication 500000 UNIT(S): at 23:00

## 2022-01-28 RX ADMIN — INSULIN GLARGINE 7 UNIT(S): 100 INJECTION, SOLUTION SUBCUTANEOUS at 22:26

## 2022-01-28 RX ADMIN — TIOTROPIUM BROMIDE 1 CAPSULE(S): 18 CAPSULE ORAL; RESPIRATORY (INHALATION) at 10:16

## 2022-01-28 RX ADMIN — Medication 325 MILLIGRAM(S): at 11:50

## 2022-01-28 RX ADMIN — KETOTIFEN FUMARATE 1 DROP(S): 0.34 SOLUTION OPHTHALMIC at 17:35

## 2022-01-28 RX ADMIN — ENOXAPARIN SODIUM 30 MILLIGRAM(S): 100 INJECTION SUBCUTANEOUS at 11:49

## 2022-01-28 RX ADMIN — KETOTIFEN FUMARATE 1 DROP(S): 0.34 SOLUTION OPHTHALMIC at 05:53

## 2022-01-28 RX ADMIN — Medication 1 TABLET(S): at 17:35

## 2022-01-28 RX ADMIN — Medication 7 UNIT(S): at 17:37

## 2022-01-28 RX ADMIN — Medication 2: at 17:36

## 2022-01-28 RX ADMIN — Medication 500000 UNIT(S): at 00:01

## 2022-01-28 RX ADMIN — MONTELUKAST 10 MILLIGRAM(S): 4 TABLET, CHEWABLE ORAL at 11:50

## 2022-01-28 RX ADMIN — MIRTAZAPINE 7.5 MILLIGRAM(S): 45 TABLET, ORALLY DISINTEGRATING ORAL at 21:27

## 2022-01-28 RX ADMIN — Medication 7 UNIT(S): at 12:50

## 2022-01-28 RX ADMIN — MEROPENEM 100 MILLIGRAM(S): 1 INJECTION INTRAVENOUS at 05:52

## 2022-01-28 RX ADMIN — Medication 50 MILLIGRAM(S): at 05:50

## 2022-01-28 RX ADMIN — MEROPENEM 100 MILLIGRAM(S): 1 INJECTION INTRAVENOUS at 13:15

## 2022-01-28 RX ADMIN — Medication 6 MILLIGRAM(S): at 05:50

## 2022-01-28 RX ADMIN — FAMOTIDINE 40 MILLIGRAM(S): 10 INJECTION INTRAVENOUS at 21:27

## 2022-01-28 RX ADMIN — BUDESONIDE AND FORMOTEROL FUMARATE DIHYDRATE 2 PUFF(S): 160; 4.5 AEROSOL RESPIRATORY (INHALATION) at 21:25

## 2022-01-28 RX ADMIN — Medication 500000 UNIT(S): at 05:50

## 2022-01-28 RX ADMIN — BUDESONIDE AND FORMOTEROL FUMARATE DIHYDRATE 2 PUFF(S): 160; 4.5 AEROSOL RESPIRATORY (INHALATION) at 09:14

## 2022-01-28 RX ADMIN — Medication 37.5 MILLIGRAM(S): at 11:50

## 2022-01-28 NOTE — PROGRESS NOTE ADULT - ASSESSMENT
65 y/o Leandro speaking Female, with a PmHx of Diffuse Cystic Bronchiectasis and Primary Ciliary Dyskinesia on 2L NC QD and AVAPS QHS, Chronic Sinusitis, ABPA, HTN, GERD, IDDM2, Depression, Nodular Liver and CBD stricture s/p PTC  and admissions for PNA in past, presented to the [facility] ED for SOB  pt is unvaccinated currently being treated for COVID. Endocrine called today for DM assistance, currently on Dexamethasone and Remdesvir     1. DM 2  HbA1c 9%  Home Regimen: Lantus 14 units qHS, Novolog 5-8 units TID (uses correctional scale per son)  Given her body habitus/low BMI, recommend to sent DM 1/PIPPA antibodies and cpeptide  While inpatient, on Dexamethasone 6 mg for COVID until 1/28    While inpatient:   BG target 100-180 mg/dl  Recommend to continue current dosing for now and will likely need to start decreasing tomorrow, as steroid effect wears off   Continue Lantus 7 units SQ qHS  Continue Admelog 7 units SQ TID before meals (Hold if NPO/not eating meal)   Continue Admelog moderate dose scale before meals, low dose at bedtime  Check BG before meals and bedtime  Consistent carbohydrate diet  RD consult completed   Hypoglycemia protocol   DM Education: Successful insulin PEN return demonstration done with [facility] pharmacist 1/27, patient's family also assists her with injections at home    Discharge Plan:   Resume basal/bolus insulin PENS (Lantus and Novolog), dose TBD  Please send Freestyle Tessie 2 CGM to assess insurance coverage   Ensure she has glucometer, glucose test strips, lancets, alcohol swabs and insulin PEN needles  Would also recommend glucagon kit for home treatment of hypoglycemia - or can use Gvoke, Baqsimi    Followup with PCP, optho, podiatry   For endocrine followup, can schedule at [facility]: [phone] [facility] [address], [phone]    2. Steroid induced hyperglycemia  LAST DAY Dexamethasone 6 mg daily 1/28, please notify endocrine with any changes in steroid plan     3. HTN  BP management as per primary team in the setting of COVID    4. HLD  LDL 61  Consider statin given DM as CV risk factor if no contraindications     Sabrina Plaza  Nurse Practitioner  Division of Endocrinology & Diabetes  In house pager #[phone]/long range pager #[phone]    If before 9AM or after 6PM, or on weekends/holidays, please call endocrine answering service for assistance ([phone]).  For nonurgent matters email [email or web address] for assistance.

## 2022-01-28 NOTE — DISCHARGE NOTE PROVIDER - NSDCMRMEDTOKEN_GEN_ALL_CORE_FT
Admelog SoloStar 100 units/mL injectable solution: 6 unit(s) injectable 3 times a day (before meals)  AVAPS: AVAPS  TV: 380  Fio2: 28%  Exp pressure 5  Max pressure 25  Min pressure 15  Backup rate 18  Baqsimi One Pack 3 mg nasal powder: 3 milligram(s) intranasally once a day as needed for severe hypoglycemia  Basaglar KwikPen 100 units/mL subcutaneous solution: 14 unit(s) subcutaneous once a day (at bedtime)  bisacodyl 5 mg oral delayed release tablet: 1 tab(s) orally 2 times a day  Calcium 600+D 600 mg-5 mcg (200 intl units) oral tablet: 1 tab(s) orally 2 times a day  famotidine 40 mg oral tablet: 1 tab(s) orally once a day (at bedtime)  ferrous gluconate 324 mg (37.5 mg elemental iron) oral tablet: 1 tab(s) orally 2 times a day  Flonase 50 mcg/inh nasal spray: 1 spray(s) in each nostril once a day  Freestyle Tessie 2 READER: Prescribe 1 Freestyle Libre2 READER for use with Freestyle Tessie Sensors   Freestyle Tessie 2 SENSORS: 1 application subcutaneous apply Freestyle Libre2 sensor to back of arm, change every 2 weeks  ketotifen 0.025% ophthalmic solution: 1 drop(s) to each affected eye 2 times a day  losartan 50 mg oral tablet: 1 tab(s) orally once a day  metoprolol succinate 50 mg oral tablet, extended release: 1 tab(s) orally once a day  mirtazapine 7.5 mg oral tablet: 1 tab(s) orally once a day (at bedtime)  montelukast 10 mg oral tablet: 1 tab(s) orally once a day  omeprazole 20 mg oral delayed release capsule: 1 cap(s) orally once a day  ProAir HFA 90 mcg/inh inhalation aerosol: 2 puff(s) inhaled every 6 hours, As Needed  sennosides-docusate 8.6 mg-50 mg oral tablet: 2 tab(s) orally once a day (at bedtime)  Spiriva 18 mcg inhalation capsule: 1 cap(s) inhaled once a day  Symbicort 160 mcg-4.5 mcg/inh inhalation aerosol: 2 puff(s) inhaled 2 times a day   Acidophilus Extra Strength oral capsule: 1 tab(s) orally once a day   Admelog SoloStar 100 units/mL injectable solution: 6 unit(s) injectable 3 times a day (before meals)  AVAPS: AVAPS  TV: 380  Fio2: 28%  Exp pressure 5  Max pressure 25  Min pressure 15  Backup rate 18  Basaglar KwikPen 100 units/mL subcutaneous solution: 14 unit(s) subcutaneous once a day (at bedtime)  bisacodyl 5 mg oral delayed release tablet: 1 tab(s) orally 2 times a day  Calcium 600+D 600 mg-5 mcg (200 intl units) oral tablet: 1 tab(s) orally 2 times a day  Effexor XR 37.5 mg oral capsule, extended release: 1 cap(s) orally once a day  famotidine 40 mg oral tablet: 1 tab(s) orally once a day (at bedtime)  ferrous gluconate 324 mg (37.5 mg elemental iron) oral tablet: 1 tab(s) orally 2 times a day  Flonase 50 mcg/inh nasal spray: 1 spray(s) in each nostril once a day  ketotifen 0.025% ophthalmic solution: 1 drop(s) to each affected eye 2 times a day  levoFLOXacin 500 mg oral tablet: 1 tab(s) orally every 24 hours   losartan 50 mg oral tablet: 1 tab(s) orally once a day  metoprolol succinate 50 mg oral tablet, extended release: 1 tab(s) orally once a day  mirtazapine 7.5 mg oral tablet: 1 tab(s) orally once a day (at bedtime)  montelukast 10 mg oral tablet: 1 tab(s) orally once a day  nystatin 100,000 units/mL oral suspension: 5 milliliter(s) orally 4 times a day  omeprazole 20 mg oral delayed release capsule: 1 cap(s) orally once a day  ProAir HFA 90 mcg/inh inhalation aerosol: 2 puff(s) inhaled every 6 hours, As Needed  sennosides-docusate 8.6 mg-50 mg oral tablet: 2 tab(s) orally once a day (at bedtime)  Spiriva 18 mcg inhalation capsule: 1 cap(s) inhaled once a day  Symbicort 160 mcg-4.5 mcg/inh inhalation aerosol: 2 puff(s) inhaled 2 times a day   Acidophilus Extra Strength oral capsule: 1 tab(s) orally once a day   Admelog SoloStar 100 units/mL injectable solution: 6 unit(s) injectable 3 times a day (before meals)  AVAPS: AVAPS  TV: 380  Fio2: 28%  Exp pressure 5  Max pressure 25  Min pressure 15  Backup rate 18  Baqsimi Two Pack 3 mg nasal powder: 3 milligram(s) intranasally once a day PRN FOR  HYPOGLYCEMIA   Basaglar KwikPen 100 units/mL subcutaneous solution: 14 unit(s) subcutaneous once a day (at bedtime)  bisacodyl 5 mg oral delayed release tablet: 1 tab(s) orally 2 times a day  Calcium 600+D 600 mg-5 mcg (200 intl units) oral tablet: 1 tab(s) orally 2 times a day  Effexor XR 37.5 mg oral capsule, extended release: 1 cap(s) orally once a day  famotidine 40 mg oral tablet: 1 tab(s) orally once a day (at bedtime)  ferrous gluconate 324 mg (37.5 mg elemental iron) oral tablet: 1 tab(s) orally 2 times a day  Flonase 50 mcg/inh nasal spray: 1 spray(s) in each nostril once a day  Freestyle Tessie 2 CGM : 1 application subcutaneous 4 times a day Dispense 1 [place]   freestyle tessie 2 sensors : 1 application subcutaneous once a day ; Dispense 2 sensors apply every 14 days   ketotifen 0.025% ophthalmic solution: 1 drop(s) to each affected eye 2 times a day  levoFLOXacin 500 mg oral tablet: 1 tab(s) orally every 24 hours   losartan 50 mg oral tablet: 1 tab(s) orally once a day  metoprolol succinate 50 mg oral tablet, extended release: 1 tab(s) orally once a day  mirtazapine 7.5 mg oral tablet: 1 tab(s) orally once a day (at bedtime)  montelukast 10 mg oral tablet: 1 tab(s) orally once a day  nystatin 100,000 units/mL oral suspension: 5 milliliter(s) orally 4 times a day  omeprazole 20 mg oral delayed release capsule: 1 cap(s) orally once a day  ProAir HFA 90 mcg/inh inhalation aerosol: 2 puff(s) inhaled every 6 hours, As Needed  sennosides-docusate 8.6 mg-50 mg oral tablet: 2 tab(s) orally once a day (at bedtime)  Spiriva 18 mcg inhalation capsule: 1 cap(s) inhaled once a day  Symbicort 160 mcg-4.5 mcg/inh inhalation aerosol: 2 puff(s) inhaled 2 times a day   Acidophilus Extra Strength oral capsule: 1 tab(s) orally once a day   Admelog SoloStar 100 units/mL injectable solution: 6 unit(s) injectable 3 times a day (before meals)  Basaglar KwikPen 100 units/mL subcutaneous solution: 14 unit(s) subcutaneous once a day (at bedtime)  bisacodyl 5 mg oral delayed release tablet: 1 tab(s) orally 2 times a day  Calcium 600+D 600 mg-5 mcg (200 intl units) oral tablet: 1 tab(s) orally 2 times a day  Effexor XR 37.5 mg oral capsule, extended release: 1 cap(s) orally once a day  famotidine 40 mg oral tablet: 1 tab(s) orally once a day (at bedtime)  ferrous gluconate 324 mg (37.5 mg elemental iron) oral tablet: 1 tab(s) orally 2 times a day  Flonase 50 mcg/inh nasal spray: 1 spray(s) in each nostril once a day  Freestyle Tessie 2 CGM : 1 application subcutaneous 4 times a day Dispense 1 [place]   ketotifen 0.025% ophthalmic solution: 1 drop(s) to each affected eye 2 times a day  levoFLOXacin 500 mg oral tablet: 1 tab(s) orally every 24 hours   losartan 50 mg oral tablet: 1 tab(s) orally once a day  metoprolol succinate 50 mg oral tablet, extended release: 1 tab(s) orally once a day  mirtazapine 7.5 mg oral tablet: 1 tab(s) orally once a day (at bedtime)  montelukast 10 mg oral tablet: 1 tab(s) orally once a day  nystatin 100,000 units/mL oral suspension: 5 milliliter(s) orally 4 times a day  omeprazole 20 mg oral delayed release capsule: 1 cap(s) orally once a day  ProAir HFA 90 mcg/inh inhalation aerosol: 2 puff(s) inhaled every 6 hours, As Needed  sennosides-docusate 8.6 mg-50 mg oral tablet: 2 tab(s) orally once a day (at bedtime)  Spiriva 18 mcg inhalation capsule: 1 cap(s) inhaled once a day  Symbicort 160 mcg-4.5 mcg/inh inhalation aerosol: 2 puff(s) inhaled 2 times a day   Acidophilus Extra Strength oral capsule: 1 tab(s) orally once a day   Admelog SoloStar 100 units/mL injectable solution: 6 unit(s) injectable 3 times a day (before meals)  Basaglar KwikPen 100 units/mL subcutaneous solution: 5 unit(s) subcutaneous once a day (at bedtime)   bisacodyl 5 mg oral delayed release tablet: 1 tab(s) orally 2 times a day  Calcium 600+D 600 mg-5 mcg (200 intl units) oral tablet: 1 tab(s) orally 2 times a day  Effexor XR 37.5 mg oral capsule, extended release: 1 cap(s) orally once a day  famotidine 40 mg oral tablet: 1 tab(s) orally once a day (at bedtime)  ferrous gluconate 324 mg (37.5 mg elemental iron) oral tablet: 1 tab(s) orally 2 times a day  Flonase 50 mcg/inh nasal spray: 1 spray(s) in each nostril once a day  Freestyle Tessie 2 CGM : 1 application subcutaneous 4 times a day Dispense 1 [place]   ketotifen 0.025% ophthalmic solution: 1 drop(s) to each affected eye 2 times a day  levoFLOXacin 500 mg oral tablet: 1 tab(s) orally every 24 hours   losartan 50 mg oral tablet: 1 tab(s) orally once a day  metoprolol succinate 50 mg oral tablet, extended release: 1 tab(s) orally once a day  mirtazapine 7.5 mg oral tablet: 1 tab(s) orally once a day (at bedtime)  montelukast 10 mg oral tablet: 1 tab(s) orally once a day  nystatin 100,000 units/mL oral suspension: 5 milliliter(s) orally 4 times a day  omeprazole 20 mg oral delayed release capsule: 1 cap(s) orally once a day  ProAir HFA 90 mcg/inh inhalation aerosol: 2 puff(s) inhaled every 6 hours, As Needed  sennosides-docusate 8.6 mg-50 mg oral tablet: 2 tab(s) orally once a day (at bedtime)  Spiriva 18 mcg inhalation capsule: 1 cap(s) inhaled once a day  Symbicort 160 mcg-4.5 mcg/inh inhalation aerosol: 2 puff(s) inhaled 2 times a day

## 2022-01-28 NOTE — PROGRESS NOTE ADULT - ASSESSMENT
65 yo F w/ diffuse cystic bronchiectasis and primary ciliary dyskinesia, on chronic home O2 and AVAPS qhs, ABPA, HTN, GERD, DM2 on long term insulin therapy, depression, liver cirrhosis and CBD stricture, recent admission for  pseudomonas PNA, admitted w/ acute on chronic hypoxic/hypercapnic respiratory failure 2/2 COVID19/bronchiectasis flare and concern for superimposed bacterial pneumonia, course c/b episodes of hypoglycemia.     # Acute on chronic hypoxic/hypercapnic respiratory failure, COVID19 infection, bronchiectasis, potential superimposed pneumonia  - pulm and ID input appreciated  - c/w duonebs, ATC 3% saline, aerobika device  - c/w supplemental O2, AVAPS qhs, monitor respiratory status closely  - symptomatic management of cough  - c/w IV meropenem as per ID (while inpatient)  - ID recs appreciated, sputum cx polymicrobial w/ pseudomonas, klebsiella, elizabethkingia, sensitivities noted, elizabethkingia resistant to meropenem, levofloxacin added to regimen, per ID on discharge likely levaquin total of 2 weeks  - s/p remdesivir 5 day course  - completing course of dexamethasone (10 day course to end 1/28)  - c/w montelukast, Spiriva, Symbicort Flonase  - pulm input appreciated, has f/u appointment 2/2 9AM w/ Dr. Young    # DM2 c/b steroid induced hyperglycemia and also by episodes of hypoglycemia  - no hypoglycemia noted overnight, PO intake also appears to be improving w/ management of mouth discomfort  - [facility]/w insulin regimen as per endocrine (lantus 7U, premeal admelog 7U tid)  - patient still w/ episodes of steroid induced hyperglycemia  - continue to monitor FS and adjust basal/bolus regimen as needed, monitor FS, continue w/ sliding scale coverage  - A1C 9.4  - endocrine assistance appreciated, will f/u further recs, regimen likely will need additional adjustment as patient completes course of steroids...   - patient able to demonstrate successful insulin PEN administration w/ [facility] pharmacist    # Loose BM  - reported loose bowel movements  - hold bowel regimen, reassess   - patient reports stools becoming more formed, only 1 BM thus far today    # Essential HTN  - c/w losartan, toprol xl    # GERD  - c/w protonix, pepcid    # Depression  c/w remeron, resumed home effexor (meds from other sources indicate was prescribed effexor 37.5 mg ER, last filled in December)    # oropharyngeal thrush  - nystatin suspension as per pulm, appears to be improving, c/w symptomatic management of mouth discomfort    # Severe protein calorie malnutrition  - BMI 15.1 on 1/21  - c/w diet, will advance to minced and moist per patient request (no beef), c/w glucerna supplementation as per nutrition recs    VTE ppx: lovenox    Dispo: pending continued clinical improvement, antibiotic course, endocrine optimization, potential discharge over weekend

## 2022-01-28 NOTE — DISCHARGE NOTE PROVIDER - NSDCCPCAREPLAN_GEN_ALL_CORE_FT
PRINCIPAL DISCHARGE DIAGNOSIS  Diagnosis: Pneumonia due to COVID-19 virus  Assessment and Plan of Treatment: Also acute bacterial pneumonia - you were treated with antibiotics. Please continue levaquin for 10 more days to complete two weeks course. Follow up with pulmonologist Dr Young on 2/2/2022 at 9am via telehealth.  You have been diagnosed with the COVID-19 virus during your hospital stay. You must self quarantine to complete a 10 day time period.  Monitor for fevers, shortness of breath and cough primarily.  Monitor your temperature daily to not any changes and increases.    It has been determined that you no longer need hospitalization and can recover while remaining in self-quarantine at home. You should follow the prevention steps below until a healthcare provider or local or state health department says you can return to your normal activities.  1. You should restrict activities outside your home, except for getting medical care.  2. Do not go to work, school, or public areas.  3. Avoid using public transportation, ride-sharing, or taxis.  4. Separate yourself from other people and animals in your home.  5. Call ahead before visiting your doctor.  6. Wear a facemask.  7. Cover your coughs and sneezes.  8. Clean your hands often.  9. Avoid sharing personal household items.  10. Clean all “high-touch” surfaces everyday.  11. Monitor your symptoms.  If you have a medical emergency and need to call 911, notify the dispatch personnel that you have COVID-19 If possible, put on a facemask before emergency medical services arrive.  12. Stopping home isolation.  Patients with confirmed COVID-19 should remain under home isolation precautions for 14 days since the positive COVID-19 test and until the risk of secondary transmission to others is thought to be low. The decision to discontinue home isolation precautions should be made on a case-by-case basis, in consultation with healthcare providers and state and local health departments. Your [facility] Department of Health can be reached at [phone] for further information about COVID-19.          SECONDARY DISCHARGE DIAGNOSES  Diagnosis: Bronchiectasis  Assessment and Plan of Treatment: Diffuse cystic bronchiectesis and primary ciliary dyskinesia  Continue using oxygen via nasal cannula 2-4 L at home with AVAPS at night. Follow up with a pulmonologist. Continue symbicort, singulair, duonebs. Continue aerobika and ATC 3% Saline.    Diagnosis: Steroid-induced hyperglycemia  Assessment and Plan of Treatment: 1800 calorie diabetic diet/ low salt, low fat , low cholesterol   Continue lantus novolog  Follow up with endocrine in 2 weeks    Diagnosis: GERD (gastroesophageal reflux disease)  Assessment and Plan of Treatment: Protonix, pepcid    Diagnosis: Hypertension  Assessment and Plan of Treatment: low salt, low fat, low cholesterol  Continue toprol and losartan    Diagnosis: HLD (hyperlipidemia)  Assessment and Plan of Treatment:     Diagnosis: 2019 novel coronavirus disease (COVID-19)  Assessment and Plan of Treatment:     Diagnosis: Other depression  Assessment and Plan of Treatment:

## 2022-01-28 NOTE — CHART NOTE - NSCHARTNOTEFT_GEN_A_CORE
Patient has a telehealth visit scheduled for 2/2 at 9 am with Dr. Young. Please let the patient know of the appointment prior to discharge.    Jose De Jesus Mead PGY-6  Pulmonary/Critical Care Fellow  Pager: [phone] (SEEMA) [phone] (NS)  Pulmonary Spectra #[phone] (NS) / [phone] (SEEMA)

## 2022-01-28 NOTE — DISCHARGE NOTE PROVIDER - NSDCFUADDAPPT_GEN_ALL_CORE_FT
Follow up with Pulmonogist Dr Young via telehealth on 2/2/2022;  Continue supplemental O2 to maintain SpO2 > 88%, remains on home 2L NC    For endocrine followup, can schedule at [facility]: [address] [facility] [address], [phone]- Follow up with Pulmonologist Dr Young via telehealth on 2/2/2022;  Continue supplemental O2 to maintain SpO2 > 88%, remains on home 2L NC., Continue AVAPS at night     For endocrine followup, can schedule at [facility]: [address] [facility] [address], [phone]    Follow up with your primary care doctor in one week.  Follow up with an opthomalogist in one month.

## 2022-01-28 NOTE — DISCHARGE NOTE PROVIDER - HOSPITAL COURSE
65 y/o Leandro speaking Female, with a PmHx of Diffuse Cystic Bronchiectasis and Primary Ciliary Dyskinesia on 2L NC QD and AVAPS QHS, Chronic Sinusitis, ABPA (unclear history, 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     + COVID Positive - on 4 LPM NC, AVAPS qhs, started Remdesivir & Dex 1/21  + Bacterial Pneumonia - on Angelica and Azithromycin iv        Acute on chronic hypoxic/hypercapnic respiratory failure, COVID19 infection, bronchiectasis, potential superimposed pneumonia  - pulm and ID input appreciated  - c/w duonebs, ATC 3% saline, aerobika device  - c/w supplemental O2, AVAPS qhs, monitor respiratory status closely  - c/w IV meropenem as per ID  - ID recs- sputum cx polymicrobial w/ pseudomonas, klebsiella, elizabethkingia, sensitivities noted, elizabethkingia resistant to meropenem, adding on levofloxain to regimen, per ID on discharge likely levaquin total of 2 weeks  - s/p Remdesivir for 5 days, c/w dexamethasone for 10 day course  - c/w montelukast, Spiriva, Symbicort Flonase    DM2 c/b steroid induced hyperglycemia and also by episodes of hypoglycemia  - Endo house consulted -BG target 100-180 mg/dl  - Increased Lantus to 7 units SQ qHs & Admelog to 7 units SQ TID  - monitor FS, continue w/ sliding scale coverage  - A1C 9.4%    Loose BM  - reports loose bowel movements  - hold bowel regimen, reassess   - monitor stool counts, if persistent, watery, check stool studies, GI PCR, c.diff    Essential HTN  - c/w losartan, toprol xl    GERD  - c/w protonix, pepcid    Depression  c/w remeron, likely resume home effexor, meds from other sources indicate was prescribed effexor 37.5 mg ER, last filled in December   67 y/o Leandro speaking Female, with a PmHx of Diffuse Cystic Bronchiectasis and Primary Ciliary Dyskinesia on 2L NC QD and AVAPS QHS, Chronic Sinusitis, ABPA (unclear history, 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     + COVID Positive - on 4 LPM NC, AVAPS qhs, started Remdesivir & Dex 1/21  + Bacterial Pneumonia - on Angelica and Azithromycin iv        Acute on chronic hypoxic/hypercapnic respiratory failure, COVID19 infection, bronchiectasis, potential superimposed pneumonia  - pulm and ID input appreciated  - c/w duonebs, ATC 3% saline, aerobika device  - c/w supplemental O2, AVAPS qhs, monitor respiratory status closely  - c/w IV meropenem as per ID  - ID recs- sputum cx polymicrobial w/ pseudomonas, klebsiella, elizabethkingia, sensitivities noted, elizabethkingia resistant to meropenem, adding on levofloxain to regimen, per ID on discharge likely levaquin total of 2 weeks  - s/p Remdesivir for 5 days, c/w dexamethasone for 10 day course  - c/w montelukast, Spiriva, Symbicort Flonase    DM2 c/b steroid induced hyperglycemia and also by episodes of hypoglycemia  - Endo house consulted -BG target 100-180 mg/dl  - Increased Lantus to 7 units SQ qHs & Admelog to 7 units SQ TID  - monitor FS, continue w/ sliding scale coverage  - A1C 9.4%    Loose BM  - reports loose bowel movements  - hold bowel regimen, reassess   - monitor stool counts, if persistent, watery, check stool studies, GI PCR, c.diff    Essential HTN  - c/w losartan, toprol xl    GERD  - c/w protonix, pepcid    Depression  c/w remeron, likely resume home effexor, meds from other sources indicate was prescribed effexor 37.5 mg ER, last filled in December    case discussed with attending. 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     + COVID Positive - on 4 LPM NC, AVAPS qhs, started Remdesivir & Dex 1/21  + Bacterial Pneumonia - on Angelica and Azithromycin iv        Acute on chronic hypoxic/hypercapnic respiratory failure, COVID19 infection, bronchiectasis, potential superimposed pneumonia  - pulm and ID input appreciated  - c/w duonebs, ATC 3% saline, aerobika device  - c/w supplemental O2, AVAPS qhs, monitor respiratory status closely  - c/w IV meropenem as per ID  - ID recs- sputum cx polymicrobial w/ pseudomonas, klebsiella, elizabethkingia, sensitivities noted, elizabethkingia resistant to meropenem, adding on levofloxain to regimen, per ID on discharge likely levaquin total of 2 weeks  - s/p Remdesivir for 5 days, c/w dexamethasone for 10 day course  - c/w montelukast, Spiriva, Symbicort Flonase    DM2 c/b steroid induced hyperglycemia and also by episodes of hypoglycemia  - Endo house consulted -BG target 100-180 mg/dl  - Increased Lantus to 7 units SQ qHs & Admelog to 7 units SQ TID  - monitor FS, continue w/ sliding scale coverage  - A1C 9.4%  Resume basal/bolus insulin PENS (Lantus and Novolog),   Please send Freestyle Tessie 2 CGM to assess insurance coverage   Ensure she has glucometer, glucose test strips, lancets, alcohol swabs and insulin PEN needles, also recommend glucagon kit for home treatment of hypoglycemia - or can use Gvoke, Baqsimi    Followup with PCP, optho, podiatry   For endocrine followup, can schedule at [facility]: [address] [facility] [address], [phone]- supplemental O2 to maintain SpO2 > 88%, remains on home 2L NC      Loose BM  - reports loose bowel movements  - hold bowel regimen, reassess   - monitor stool counts, if persistent, watery, check stool studies, GI PCR, c.diff    Essential HTN  - c/w losartan, toprol xl    GERD  - c/w protonix, pepcid    Depression  c/w remeron, likely resume home effexor, meds from other sources indicate was prescribed effexor 37.5 mg ER, last filled in December    Case discussed with attending. Patient stable for discharge home 1/29/2022   65 y/o Leandro speaking Female, with a PmHx of Diffuse Cystic Bronchiectasis and Primary Ciliary Dyskinesia on 2L NC QD and AVAPS QHS, Chronic Sinusitis, ABPA (unclear history, 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     + COVID Positive - on 4 LPM NC, AVAPS qhs, started Remdesivir & Dex 1/21  + Bacterial Pneumonia - on Angelica and Azithromycin iv        Acute on chronic hypoxic/hypercapnic respiratory failure, COVID19 infection, bronchiectasis, potential superimposed pneumonia  - pulm and ID input appreciated  - c/w duonebs, ATC 3% saline, aerobika device  - c/w supplemental O2, AVAPS qhs, monitor respiratory status closely  - c/w IV meropenem as per ID  - ID recs- sputum cx polymicrobial w/ pseudomonas, klebsiella, elizabethkingia, sensitivities noted, elizabethkingia resistant to meropenem, adding on levofloxain to regimen, per ID on discharge likely levaquin total of 2 weeks  - s/p Remdesivir for 5 days, c/w dexamethasone for 10 day course  - c/w montelukast, Spiriva, Symbicort Flonase    DM2 c/b steroid induced hyperglycemia and also by episodes of hypoglycemia  - Endo house consulted -BG target 100-180 mg/dl  - Increased Lantus to 7 units SQ qHs & Admelog to 7 units SQ TID  - monitor FS, continue w/ sliding scale coverage  - A1C 9.4%  Resume basal/bolus insulin PENS (Lantus and Novolog),   Please send Freestyle Tessie 2 CGM to assess insurance coverage   Ensure she has glucometer, glucose test strips, lancets, alcohol swabs and insulin PEN needles, also recommend glucagon kit for home treatment of hypoglycemia - or can use Gvoke, Baqsimi    Followup with PCP, optho, podiatry   For endocrine followup, can schedule at [facility]: [address] [facility] [address], [phone]- supplemental O2 to maintain SpO2 > 88%, remains on home 2L NC      Loose BM  - reports loose bowel movements  - hold bowel regimen, reassess   - monitor stool counts, if persistent, watery, check stool studies, GI PCR, c.diff    Essential HTN  - c/w losartan, toprol xl    GERD  - c/w protonix, pepcid    Depression  c/w remeron, likely resume home effexor, meds from other sources indicate was prescribed effexor 37.5 mg ER, last filled in December    Case discussed with attending. 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     + COVID Positive - on 4 LPM NC, AVAPS qhs, started Remdesivir & Dex 1/21  + Bacterial Pneumonia - on Angelica and Azithromycin iv        Acute on chronic hypoxic/hypercapnic respiratory failure, COVID19 infection, bronchiectasis, potential superimposed pneumonia  - pulm and ID input appreciated  - c/w duonebs, ATC 3% saline, aerobika device  - c/w supplemental O2, AVAPS qhs, monitor respiratory status closely  - c/w IV meropenem as per ID  - ID recs- sputum cx polymicrobial w/ pseudomonas, klebsiella, elizabethkingia, sensitivities noted, elizabethkingia resistant to meropenem, adding on levofloxain to regimen, per ID on discharge likely levaquin total of 2 weeks  - s/p Remdesivir for 5 days, c/w dexamethasone for 10 day course  - c/w montelukast, Spiriva, Symbicort Flonase    DM2 c/b steroid induced hyperglycemia and also by episodes of hypoglycemia  - Endo house consulted -BG target 100-180 mg/dl  - Increased Lantus to 7 units SQ qHs & Admelog to 7 units SQ TID  - monitor FS, continue w/ sliding scale coverage  - A1C 9.4%  -Resume basal/bolus insulin PENS (Lantus and Novolog),   - Sent Freestyle Tessie 2 CGM to assess insurance coverage       Followup with PCP, optho, podiatry   For endocrine followup, can schedule at [facility]: [address] [facility] [address], [phone]- supplemental O2 to maintain SpO2 > 88%, remains on home 2L NC      Depression  c/w remeron, likely resume home effexor, meds from other sources indicate was prescribed effexor 37.5 mg ER, last filled in December    Case discussed with attending. Patient stable for discharge home 1/31/2022

## 2022-01-28 NOTE — DISCHARGE NOTE PROVIDER - DETAILS OF MALNUTRITION DIAGNOSIS/DIAGNOSES
This patient has been assessed with a concern for Malnutrition and was treated during this hospitalization for the following Nutrition diagnosis/diagnoses:     -  01/27/2022: Severe protein-calorie malnutrition   -  01/27/2022: Underweight (BMI < 19)

## 2022-01-28 NOTE — PROGRESS NOTE ADULT - SUBJECTIVE AND OBJECTIVE BOX
Chief Complaint: DM 2, COVID+ on Dexamethasone    History: Patient seen at bedside. PO intake is still limited by mouth pain. Glucose values within or close to goal today, no hypoglycemia. Last dose of Dexamethasone 6 mg given this AM 1/28    MEDICATIONS  (STANDING):  budesonide 160 MICROgram(s)/formoterol 4.5 MICROgram(s) Inhaler 2 Puff(s) Inhalation two times a day  calcium carbonate 1250 mG  + Vitamin D (OsCal 500 + D) 1 Tablet(s) Oral two times a day  dextrose 40% Gel 15 Gram(s) Oral once  dextrose 5%. 1000 milliLiter(s) (50 mL/Hr) IV Continuous <Continuous>  dextrose 5%. 1000 milliLiter(s) (100 mL/Hr) IV Continuous <Continuous>  dextrose 50% Injectable 25 Gram(s) IV Push once  dextrose 50% Injectable 12.5 Gram(s) IV Push once  dextrose 50% Injectable 25 Gram(s) IV Push once  enoxaparin Injectable 30 milliGRAM(s) SubCutaneous daily  famotidine    Tablet 40 milliGRAM(s) Oral at bedtime  ferrous    sulfate 325 milliGRAM(s) Oral daily  fluticasone propionate 50 MICROgram(s)/spray Nasal Spray 1 Spray(s) Both Nostrils two times a day  glucagon  Injectable 1 milliGRAM(s) IntraMuscular once  guaiFENesin  milliGRAM(s) Oral every 12 hours  insulin glargine Injectable (LANTUS) 7 Unit(s) SubCutaneous at bedtime  insulin lispro (ADMELOG) corrective regimen sliding scale   SubCutaneous three times a day before meals  insulin lispro (ADMELOG) corrective regimen sliding scale   SubCutaneous at bedtime  insulin lispro Injectable (ADMELOG) 7 Unit(s) SubCutaneous three times a day before meals  ketotifen 0.025% Ophthalmic Solution 1 Drop(s) Both EYES two times a day  lactobacillus acidophilus 1 Tablet(s) Oral two times a day  levoFLOXacin  Tablet 500 milliGRAM(s) Oral every 24 hours  losartan 50 milliGRAM(s) Oral daily  metoprolol succinate ER 50 milliGRAM(s) Oral daily  mirtazapine 7.5 milliGRAM(s) Oral at bedtime  montelukast 10 milliGRAM(s) Oral daily  nystatin    Suspension 758505 Unit(s) Oral four times a day  pantoprazole    Tablet 40 milliGRAM(s) Oral before breakfast  tiotropium 18 MICROgram(s) Capsule 1 Capsule(s) Inhalation daily  venlafaxine XR. 37.5 milliGRAM(s) Oral daily    MEDICATIONS  (PRN):  acetaminophen     Tablet .. 650 milliGRAM(s) Oral every 6 hours PRN Temp greater or equal to 38C (100.4F), Mild Pain (1 - 3), Moderate Pain (4 - 6)  ALBUTerol    90 MICROgram(s) HFA Inhaler 2 Puff(s) Inhalation every 6 hours PRN Shortness of Breath and/or Wheezing  benzocaine 15 mG/menthol 3.6 mG Lozenge 1 Lozenge Oral every 6 hours PRN Sore Throat  hydrocortisone hemorrhoidal Suppository 1 Suppository(s) Rectal two times a day PRN Rectal pain/hemorrhoids  simethicone 80 milliGRAM(s) Chew every 8 hours PRN Gas    No Known Allergies    Review of Systems:  HEENT: No pain  Cardiovascular: No chest pain  Respiratory: No SOB  GI: No nausea, vomiting    PHYSICAL EXAM:  VITALS: T(C): 36.7 (01-28-22 @ 13:18)  T(F): 98 (01-28-22 @ 13:18), Max: 98.4 (01-27-22 @ 14:33)  HR: 72 (01-28-22 @ 13:18) (59 - 80)  BP: 118/57 (01-28-22 @ 13:18) (118/57 - 126/64)  RR:  (18 - 18)  SpO2:  (97% - 100%)  Wt(kg): --  GENERAL: NAD  EYES: No proptosis, no lid lag, anicteric  HEENT:  Atraumatic, Normocephalic, moist mucous membranes  RESPIRATORY: unlabored respirations     CAPILLARY BLOOD GLUCOSE    POCT Blood Glucose.: 204 mg/dL (28 Jan 2022 12:44)  POCT Blood Glucose.: 155 mg/dL (28 Jan 2022 09:02)  POCT Blood Glucose.: 240 mg/dL (27 Jan 2022 22:22)  POCT Blood Glucose.: 244 mg/dL (27 Jan 2022 17:58)      01-27    139  |  101  |  18  ----------------------------<  176<H>  4.1   |  32<H>  |  0.33<L>    EGFR if : 136  EGFR if non : 117    Ca    8.7      01-27  Mg     1.80     01-26  Phos  2.4     01-26    TPro  5.3<L>  /  Alb  2.5<L>  /  TBili  0.2  /  DBili  <0.2  /  AST  25  /  ALT  28  /  AlkPhos  130<H>  01-27      Thyroid Function Tests:  01-21 @ 14:31 TSH 4.32 FreeT4 -- T3 -- Anti TPO -- Anti Thyroglobulin Ab -- TSI --      A1C with Estimated Average Glucose Result: 9.4 % (01-21-22 @ 14:26)  A1C with Estimated Average Glucose Result: 8.9 % (06-30-21 @ 07:28)  A1C with Estimated Average Glucose Result: 8.5 % (04-08-21 @ 07:36)  A1C with Estimated Average Glucose Result: 8.2 % (04-01-21 @ 15:25)    Diet, Pureed:   Consistent Carbohydrate No Snacks (CSTCHO)  DASH/TLC Sodium & Cholesterol Restricted (DASH)  Supplement Feeding Modality:  Oral  Glucerna Shake Cans or Servings Per Day:  1       Frequency:  Two Times a day (01-27-22 @ 19:54)

## 2022-01-28 NOTE — PROGRESS NOTE ADULT - ASSESSMENT
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), previous admissions for  Pseudomonas PNA but last pseudomonas was not CRE, now sent  by pulmonary for fever and cough, all family members had COVID 2-3 weeks ago but she stated to get dyspnea about 2 weeks ago and now worse  here afebrile, WBC: 12  COVID positive, legionella negative  blood cx negative, sputum cx with pseudomonas, klebsiella and elizabethkingia   CXR: no evidence of bacterial pneumonia     cystic bronchiectasis and ciliary dyskinesia  on home O2 now with fever, cough due to COVID (unvaccinated), ?bronchiectasis exacerbation  sputum cx polymicrobial with pseudomonas, klebsiella and elizabethkingia which is more s/o colonization, no consolidation on CXR  oropharyngeal candidosis    * the elizabethkingia is R to geoff, so  levo 500 qd was added 1/25  * c/w geoff, while in the hospital, started 1/21, now day 8 but on discharge will only do levofloxacin (sensitive to all organisms), likely total 2 weeks  * c/w nystatin  * s/p remdesivir now on dexa for up to a 10 day course  * monitor the O2 sat    The above assessment and plan was discussed with the primary team     Urmila Rose MD  Pager [phone]  After 5pm and on weekends call [phone]

## 2022-01-28 NOTE — PROGRESS NOTE ADULT - SUBJECTIVE AND OBJECTIVE BOX
Follow Up:  COVID, bronchiectasis exacerbation    Interval History: pt afebrile, improved cough, on baseline O2    ROS:      All other systems negative    Constitutional: no fever, no chills  Cardiovascular:  no chest pain, no palpitation  Respiratory:  no SOB, improved cough and is mostly dry  GI:  no abd pain, no vomiting, no diarrhea  urinary: no dysuria, no hematuria, no flank pain  musculoskeletal:  no joint pain, no joint swelling  skin:  no rash  neurology:  no headache, no seizure          Allergies  No Known Allergies        ANTIMICROBIALS:  levoFLOXacin  Tablet 500 every 24 hours  meropenem  IVPB 1000 every 8 hours  nystatin    Suspension 204544 four times a day      OTHER MEDS:  acetaminophen     Tablet .. 650 milliGRAM(s) Oral every 6 hours PRN  ALBUTerol    90 MICROgram(s) HFA Inhaler 2 Puff(s) Inhalation every 6 hours PRN  benzocaine 15 mG/menthol 3.6 mG Lozenge 1 Lozenge Oral every 6 hours PRN  budesonide 160 MICROgram(s)/formoterol 4.5 MICROgram(s) Inhaler 2 Puff(s) Inhalation two times a day  calcium carbonate 1250 mG  + Vitamin D (OsCal 500 + D) 1 Tablet(s) Oral two times a day  dextrose 40% Gel 15 Gram(s) Oral once  dextrose 5%. 1000 milliLiter(s) IV Continuous <Continuous>  dextrose 5%. 1000 milliLiter(s) IV Continuous <Continuous>  dextrose 50% Injectable 25 Gram(s) IV Push once  dextrose 50% Injectable 12.5 Gram(s) IV Push once  dextrose 50% Injectable 25 Gram(s) IV Push once  enoxaparin Injectable 30 milliGRAM(s) SubCutaneous daily  famotidine    Tablet 40 milliGRAM(s) Oral at bedtime  ferrous    sulfate 325 milliGRAM(s) Oral daily  fluticasone propionate 50 MICROgram(s)/spray Nasal Spray 1 Spray(s) Both Nostrils two times a day  glucagon  Injectable 1 milliGRAM(s) IntraMuscular once  guaiFENesin  milliGRAM(s) Oral every 12 hours  hydrocortisone hemorrhoidal Suppository 1 Suppository(s) Rectal two times a day PRN  insulin glargine Injectable (LANTUS) 7 Unit(s) SubCutaneous at bedtime  insulin lispro (ADMELOG) corrective regimen sliding scale   SubCutaneous three times a day before meals  insulin lispro (ADMELOG) corrective regimen sliding scale   SubCutaneous at bedtime  insulin lispro Injectable (ADMELOG) 7 Unit(s) SubCutaneous three times a day before meals  ketotifen 0.025% Ophthalmic Solution 1 Drop(s) Both EYES two times a day  lactobacillus acidophilus 1 Tablet(s) Oral two times a day  losartan 50 milliGRAM(s) Oral daily  metoprolol succinate ER 50 milliGRAM(s) Oral daily  mirtazapine 7.5 milliGRAM(s) Oral at bedtime  montelukast 10 milliGRAM(s) Oral daily  pantoprazole    Tablet 40 milliGRAM(s) Oral before breakfast  simethicone 80 milliGRAM(s) Chew every 8 hours PRN  tiotropium 18 MICROgram(s) Capsule 1 Capsule(s) Inhalation daily  venlafaxine XR. 37.5 milliGRAM(s) Oral daily      Vital Signs Last 24 Hrs  T(C): 36.7 (28 Jan 2022 05:45), Max: 36.9 (27 Jan 2022 14:33)  T(F): 98.1 (28 Jan 2022 05:45), Max: 98.4 (27 Jan 2022 14:33)  HR: 75 (28 Jan 2022 08:37) (59 - 80)  BP: 126/64 (28 Jan 2022 05:45) (122/63 - 126/64)  BP(mean): --  RR: 18 (28 Jan 2022 05:45) (18 - 18)  SpO2: 99% (28 Jan 2022 08:37) (97% - 100%)    Physical Exam:  General:    NAD, non toxic  Cardio:    regular S1,S2  Respiratory:  comfortable on NC  abd:   soft, BS +, not tender  :     no CVAT, no suprapubic tenderness, no francisco  Musculoskeletal : no joint swelling, no edema  Skin:    no rash  vascular: no phlebitis                          11.9   7.76  )-----------( 145      ( 27 Jan 2022 07:45 )             38.4       01-27    139  |  101  |  18  ----------------------------<  176<H>  4.1   |  32<H>  |  0.33<L>    Ca    8.7      27 Jan 2022 07:45    TPro  5.3<L>  /  Alb  2.5<L>  /  TBili  0.2  /  DBili  <0.2  /  AST  25  /  ALT  28  /  AlkPhos  130<H>  01-27          MICROBIOLOGY:  v  .Sputum  01-21-22   Few Pseudomonas aeruginosa  Few Klebsiella pneumoniae ESBL  Rare Elizabethkingia meningoseptica  Few Mixed upper respiratory prashanth  --  Pseudomonas aeruginosa  Klebsiella pneumoniae ESBL  Elizabethkingia meningoseptica      Clean Catch Clean Catch (Midstream)  01-21-22   No growth  --  --      .Blood Blood-Peripheral  01-21-22   No Growth Final  --  --      .Blood Blood-Peripheral  01-21-22   No Growth Final  --  --          Rapid RVP Result: Detected (01-21 @ 15:08)        RADIOLOGY:  Images independently visualized and reviewed personally, findings as below  < from: Xray Chest 1 View- PORTABLE-Urgent (01.21.22 @ 14:32) >  IMPRESSION:    No radiographic evidence of pneumonia.    < end of copied text >  < from: CT Angio Chest PE Protocol w/ IV Cont (07.14.21 @ 18:24) >  IMPRESSION:  Bronchial dilatation and bronchial wall thickening, consistent with bronchiectasis as on the prior study.    Previously visualized left lower lobe peripheral nodular consolidations and adjacent cluster of tree-in-bud opacities likely of infectious etiology have mildly improved since June 30, 2021. No new opacities.    No evidence of pulmonary embolism.    < end of copied text >

## 2022-01-28 NOTE — CHART NOTE - NSCHARTNOTEFT_GEN_A_CORE
Patient's respiratory status appears stable and she remains on her baseline oxygen requirements. She will complete her steroid course for COVID today, and hopefully her glucose will stabilize once off steroids. She has received an adequate duration of IV antibiotics and should be discharged to complete a 2 week course of levaquin. Please ensure patient has follow up with Dr. Young with 2 weeks of discharge. Please email [email or web address] and include patient's name,  and MRN and allow 24 hours for scheduling.    Jose De Jesus Mead PGY-6  Pulmonary/Critical Care Fellow  Pager: [phone] (SEEMA) [phone] (NS)  Pulmonary Spectra #[id number] (NS) / [phone] ([place]AR[place]) Patient's respiratory status appears stable and she remains on her baseline oxygen requirements. She will complete her steroid course for COVID today, and hopefully her glucose will stabilize once off steroids. She has received an adequate duration of IV antibiotics and should be discharged to complete a 2 week course of levaquin. She should also restart her home airway clearance regimen with inhaled 3% saline, nebulizers and aerobika. Please ensure patient has follow up with Dr. Young with 2 weeks of discharge. Please email [email or web address] and include patient's name,  and MRN and allow 24 hours for scheduling.    Jose De Jesus Mead PGY-6  Pulmonary/Critical Care Fellow  Pager: [phone] (SEEMA) [phone] (NS)  Pulmonary Spectra #[id number] (NS) / [phone] (SEMEA)

## 2022-01-28 NOTE — PROGRESS NOTE ADULT - SUBJECTIVE AND OBJECTIVE BOX
[facility] Medicine  Pager [phone]    Patient is a 66y old  Female who presents with a chief complaint of SOB (28 Jan 2022 12:15)      INTERVAL HPI/OVERNIGHT EVENTS:    MEDICATIONS  (STANDING):  budesonide 160 MICROgram(s)/formoterol 4.5 MICROgram(s) Inhaler 2 Puff(s) Inhalation two times a day  calcium carbonate 1250 mG  + Vitamin D (OsCal 500 + D) 1 Tablet(s) Oral two times a day  dextrose 40% Gel 15 Gram(s) Oral once  dextrose 5%. 1000 milliLiter(s) (50 mL/Hr) IV Continuous <Continuous>  dextrose 5%. 1000 milliLiter(s) (100 mL/Hr) IV Continuous <Continuous>  dextrose 50% Injectable 25 Gram(s) IV Push once  dextrose 50% Injectable 12.5 Gram(s) IV Push once  dextrose 50% Injectable 25 Gram(s) IV Push once  enoxaparin Injectable 30 milliGRAM(s) SubCutaneous daily  famotidine    Tablet 40 milliGRAM(s) Oral at bedtime  ferrous    sulfate 325 milliGRAM(s) Oral daily  fluticasone propionate 50 MICROgram(s)/spray Nasal Spray 1 Spray(s) Both Nostrils two times a day  glucagon  Injectable 1 milliGRAM(s) IntraMuscular once  guaiFENesin  milliGRAM(s) Oral every 12 hours  insulin glargine Injectable (LANTUS) 7 Unit(s) SubCutaneous at bedtime  insulin lispro (ADMELOG) corrective regimen sliding scale   SubCutaneous three times a day before meals  insulin lispro (ADMELOG) corrective regimen sliding scale   SubCutaneous at bedtime  insulin lispro Injectable (ADMELOG) 7 Unit(s) SubCutaneous three times a day before meals  ketotifen 0.025% Ophthalmic Solution 1 Drop(s) Both EYES two times a day  lactobacillus acidophilus 1 Tablet(s) Oral two times a day  levoFLOXacin  Tablet 500 milliGRAM(s) Oral every 24 hours  losartan 50 milliGRAM(s) Oral daily  meropenem  IVPB 1000 milliGRAM(s) IV Intermittent every 8 hours  metoprolol succinate ER 50 milliGRAM(s) Oral daily  mirtazapine 7.5 milliGRAM(s) Oral at bedtime  montelukast 10 milliGRAM(s) Oral daily  nystatin    Suspension 780063 Unit(s) Oral four times a day  pantoprazole    Tablet 40 milliGRAM(s) Oral before breakfast  tiotropium 18 MICROgram(s) Capsule 1 Capsule(s) Inhalation daily  venlafaxine XR. 37.5 milliGRAM(s) Oral daily    MEDICATIONS  (PRN):  acetaminophen     Tablet .. 650 milliGRAM(s) Oral every 6 hours PRN Temp greater or equal to 38C (100.4F), Mild Pain (1 - 3), Moderate Pain (4 - 6)  ALBUTerol    90 MICROgram(s) HFA Inhaler 2 Puff(s) Inhalation every 6 hours PRN Shortness of Breath and/or Wheezing  benzocaine 15 mG/menthol 3.6 mG Lozenge 1 Lozenge Oral every 6 hours PRN Sore Throat  hydrocortisone hemorrhoidal Suppository 1 Suppository(s) Rectal two times a day PRN Rectal pain/hemorrhoids  simethicone 80 milliGRAM(s) Chew every 8 hours PRN Gas      Allergies    No Known Allergies    Intolerances        REVIEW OF SYSTEMS:  Please see interval HPI:    Vital Signs Last 24 Hrs  T(C): 36.7 (28 Jan 2022 05:45), Max: 36.9 (27 Jan 2022 14:33)  T(F): 98.1 (28 Jan 2022 05:45), Max: 98.4 (27 Jan 2022 14:33)  HR: 75 (28 Jan 2022 08:37) (59 - 80)  BP: 126/64 (28 Jan 2022 05:45) (122/63 - 126/64)  BP(mean): --  RR: 18 (28 Jan 2022 05:45) (18 - 18)  SpO2: 99% (28 Jan 2022 08:37) (97% - 100%)  I&O's Detail    27 Jan 2022 07:01  -  28 Jan 2022 07:00  --------------------------------------------------------  IN:    Oral Fluid: 480 mL  Total IN: 480 mL    OUT:  Total OUT: 0 mL    Total NET: 480 mL            PHYSICAL EXAM:  GENERAL:   HEAD:    EYES:   ENMT:   NECK:   NERVOUS SYSTEM:    CHEST/LUNG:   HEART:   ABDOMEN:   EXTREMITIES:    LYMPH:   SKIN:     LABS:      Ca    8.7        27 Jan 2022 07:45        CAPILLARY BLOOD GLUCOSE      POCT Blood Glucose.: 155 mg/dL (28 Jan 2022 09:02)  POCT Blood Glucose.: 240 mg/dL (27 Jan 2022 22:22)  POCT Blood Glucose.: 244 mg/dL (27 Jan 2022 17:58)  POCT Blood Glucose.: 380 mg/dL (27 Jan 2022 12:47)    BLOOD CULTURE    RADIOLOGY & ADDITIONAL TESTS:    Imaging Personally Reviewed:  [ ] YES     Consultant(s) Notes Reviewed:      Care Discussed with Consultants/Other Providers: [facility] Medicine  Pager [phone]    Patient is a 66y old  Female who presents with a chief complaint of SOB (28 Jan 2022 12:15)      INTERVAL HPI/OVERNIGHT EVENTS:  Donnie Roberts providing Leandro translation per patient request. Patient still with cough, requests some chest PT, but overall respiratory status improving. Notes stools also becoming more formed. Still having mouth discomfort though tongue is better. Doesn't like pureed food, wants to be able to eat more, requests upgrade in diet (no beef). Doesn't feel comfortable leaving the hospital today. Will continue with symptomatic management, monitor for continued clinical improvement.     Donnie Roberts in agreement w/ plan.     MEDICATIONS  (STANDING):  budesonide 160 MICROgram(s)/formoterol 4.5 MICROgram(s) Inhaler 2 Puff(s) Inhalation two times a day  calcium carbonate 1250 mG  + Vitamin D (OsCal 500 + D) 1 Tablet(s) Oral two times a day  dextrose 40% Gel 15 Gram(s) Oral once  dextrose 5%. 1000 milliLiter(s) (50 mL/Hr) IV Continuous <Continuous>  dextrose 5%. 1000 milliLiter(s) (100 mL/Hr) IV Continuous <Continuous>  dextrose 50% Injectable 25 Gram(s) IV Push once  dextrose 50% Injectable 12.5 Gram(s) IV Push once  dextrose 50% Injectable 25 Gram(s) IV Push once  enoxaparin Injectable 30 milliGRAM(s) SubCutaneous daily  famotidine    Tablet 40 milliGRAM(s) Oral at bedtime  ferrous    sulfate 325 milliGRAM(s) Oral daily  fluticasone propionate 50 MICROgram(s)/spray Nasal Spray 1 Spray(s) Both Nostrils two times a day  glucagon  Injectable 1 milliGRAM(s) IntraMuscular once  guaiFENesin  milliGRAM(s) Oral every 12 hours  insulin glargine Injectable (LANTUS) 7 Unit(s) SubCutaneous at bedtime  insulin lispro (ADMELOG) corrective regimen sliding scale   SubCutaneous three times a day before meals  insulin lispro (ADMELOG) corrective regimen sliding scale   SubCutaneous at bedtime  insulin lispro Injectable (ADMELOG) 7 Unit(s) SubCutaneous three times a day before meals  ketotifen 0.025% Ophthalmic Solution 1 Drop(s) Both EYES two times a day  lactobacillus acidophilus 1 Tablet(s) Oral two times a day  levoFLOXacin  Tablet 500 milliGRAM(s) Oral every 24 hours  losartan 50 milliGRAM(s) Oral daily  meropenem  IVPB 1000 milliGRAM(s) IV Intermittent every 8 hours  metoprolol succinate ER 50 milliGRAM(s) Oral daily  mirtazapine 7.5 milliGRAM(s) Oral at bedtime  montelukast 10 milliGRAM(s) Oral daily  nystatin    Suspension 233256 Unit(s) Oral four times a day  pantoprazole    Tablet 40 milliGRAM(s) Oral before breakfast  tiotropium 18 MICROgram(s) Capsule 1 Capsule(s) Inhalation daily  venlafaxine XR. 37.5 milliGRAM(s) Oral daily    MEDICATIONS  (PRN):  acetaminophen     Tablet .. 650 milliGRAM(s) Oral every 6 hours PRN Temp greater or equal to 38C (100.4F), Mild Pain (1 - 3), Moderate Pain (4 - 6)  ALBUTerol    90 MICROgram(s) HFA Inhaler 2 Puff(s) Inhalation every 6 hours PRN Shortness of Breath and/or Wheezing  benzocaine 15 mG/menthol 3.6 mG Lozenge 1 Lozenge Oral every 6 hours PRN Sore Throat  hydrocortisone hemorrhoidal Suppository 1 Suppository(s) Rectal two times a day PRN Rectal pain/hemorrhoids  simethicone 80 milliGRAM(s) Chew every 8 hours PRN Gas    Allergies  No Known Allergies    Intolerances    REVIEW OF SYSTEMS:  Please see interval HPI:    Vital Signs Last 24 Hrs  T(C): 36.7 (28 Jan 2022 05:45), Max: 36.9 (27 Jan 2022 14:33)  T(F): 98.1 (28 Jan 2022 05:45), Max: 98.4 (27 Jan 2022 14:33)  HR: 75 (28 Jan 2022 08:37) (59 - 80)  BP: 126/64 (28 Jan 2022 05:45) (122/63 - 126/64)  BP(mean): --  RR: 18 (28 Jan 2022 05:45) (18 - 18)  SpO2: 99% (28 Jan 2022 08:37) (97% - 100%)  I&O's Detail    27 Jan 2022 07:01  -  28 Jan 2022 07:00  --------------------------------------------------------  IN:    Oral Fluid: 480 mL  Total IN: 480 mL    OUT:  Total OUT: 0 mL    Total NET: 480 mL    PHYSICAL EXAM:  GENERAL: NAD, sitting in bed, +coughing  HEAD:  NC/AT  EYES: EOMI, clear sclera/conjunctiva  ENMT: MMM, thrush appears to improving  NECK: supple  NERVOUS SYSTEM: moving extremities, non-focal   CHEST/LUNG: NC in place, no respiratory distress on supplemental O2, coarse BS  HEART: S1S2 RRR  ABDOMEN: soft, non-tender  EXTREMITIES:  no c/c/e      LABS:                        11.9   7.76  )-----------( 145      ( 27 Jan 2022 07:45 )             38.4     01-27    139  |  101  |  18  ----------------------------<  176<H>  4.1   |  32<H>  |  0.33<L>    Ca    8.7      27 Jan 2022 07:45    TPro  5.3<L>  /  Alb  2.5<L>  /  TBili  0.2  /  DBili  <0.2  /  AST  25  /  ALT  28  /  AlkPhos  130<H>  01-27    CAPILLARY BLOOD GLUCOSE  POCT Blood Glucose.: 155 mg/dL (28 Jan 2022 09:02)  POCT Blood Glucose.: 240 mg/dL (27 Jan 2022 22:22)  POCT Blood Glucose.: 244 mg/dL (27 Jan 2022 17:58)  POCT Blood Glucose.: 380 mg/dL (27 Jan 2022 12:47)    BLOOD CULTURE    RADIOLOGY & ADDITIONAL TESTS:    Imaging Personally Reviewed:  [ ] YES     Consultant(s) Notes Reviewed:  ID, Pulm, Endo    Care Discussed with Consultants/Other Providers: Endocrine Sabrina re: continue current regimen, reassess as patient will be done w/ steroids, no hypoglycemia noted overnight, ACP Lalita re: continue w/ supportive care, potential d/c over weekend if improving, to c/w PO levaquin x 2 weeks upon discharge as per ID

## 2022-01-29 LAB
ANION GAP SERPL CALC-SCNC: 5 MMOL/L — LOW (ref 7–14)
BUN SERPL-MCNC: 19 MG/DL — SIGNIFICANT CHANGE UP (ref 7–23)
CALCIUM SERPL-MCNC: 8.7 MG/DL — SIGNIFICANT CHANGE UP (ref 8.4–10.5)
CHLORIDE SERPL-SCNC: 101 MMOL/L — SIGNIFICANT CHANGE UP (ref 98–107)
CO2 SERPL-SCNC: 36 MMOL/L — HIGH (ref 22–31)
CREAT SERPL-MCNC: 0.33 MG/DL — LOW (ref 0.5–1.3)
GLUCOSE SERPL-MCNC: 100 MG/DL — HIGH (ref 70–99)
HCT VFR BLD CALC: 37.3 % — SIGNIFICANT CHANGE UP (ref 34.5–45)
HGB BLD-MCNC: 12.2 G/DL — SIGNIFICANT CHANGE UP (ref 11.5–15.5)
MCHC RBC-ENTMCNC: 28.4 PG — SIGNIFICANT CHANGE UP (ref 27–34)
MCHC RBC-ENTMCNC: 32.7 GM/DL — SIGNIFICANT CHANGE UP (ref 32–36)
MCV RBC AUTO: 86.9 FL — SIGNIFICANT CHANGE UP (ref 80–100)
NRBC # BLD: 0 /100 WBCS — SIGNIFICANT CHANGE UP
NRBC # FLD: 0 K/UL — SIGNIFICANT CHANGE UP
PLATELET # BLD AUTO: 141 K/UL — LOW (ref 150–400)
POTASSIUM SERPL-MCNC: 4.3 MMOL/L — SIGNIFICANT CHANGE UP (ref 3.5–5.3)
POTASSIUM SERPL-SCNC: 4.3 MMOL/L — SIGNIFICANT CHANGE UP (ref 3.5–5.3)
RBC # BLD: 4.29 M/UL — SIGNIFICANT CHANGE UP (ref 3.8–5.2)
RBC # FLD: 13.5 % — SIGNIFICANT CHANGE UP (ref 10.3–14.5)
SODIUM SERPL-SCNC: 142 MMOL/L — SIGNIFICANT CHANGE UP (ref 135–145)
WBC # BLD: 8.74 K/UL — SIGNIFICANT CHANGE UP (ref 3.8–10.5)
WBC # FLD AUTO: 8.74 K/UL — SIGNIFICANT CHANGE UP (ref 3.8–10.5)

## 2022-01-29 PROCEDURE — 99232 SBSQ HOSP IP/OBS MODERATE 35: CPT

## 2022-01-29 RX ORDER — DEXTROSE 50 % IN WATER 50 %
15 SYRINGE (ML) INTRAVENOUS ONCE
Refills: 0 | Status: COMPLETED | OUTPATIENT
Start: 2022-01-29 | End: 2022-01-29

## 2022-01-29 RX ORDER — INSULIN LISPRO 100/ML
VIAL (ML) SUBCUTANEOUS
Refills: 0 | Status: DISCONTINUED | OUTPATIENT
Start: 2022-01-29 | End: 2022-01-31

## 2022-01-29 RX ORDER — INSULIN LISPRO 100/ML
5 VIAL (ML) SUBCUTANEOUS
Refills: 0 | Status: DISCONTINUED | OUTPATIENT
Start: 2022-01-30 | End: 2022-01-31

## 2022-01-29 RX ORDER — INSULIN GLARGINE 100 [IU]/ML
5 INJECTION, SOLUTION SUBCUTANEOUS AT BEDTIME
Refills: 0 | Status: DISCONTINUED | OUTPATIENT
Start: 2022-01-29 | End: 2022-01-31

## 2022-01-29 RX ADMIN — Medication 500000 UNIT(S): at 05:48

## 2022-01-29 RX ADMIN — Medication 325 MILLIGRAM(S): at 11:19

## 2022-01-29 RX ADMIN — Medication 50 MILLIGRAM(S): at 05:48

## 2022-01-29 RX ADMIN — Medication 500000 UNIT(S): at 17:49

## 2022-01-29 RX ADMIN — INSULIN GLARGINE 5 UNIT(S): 100 INJECTION, SOLUTION SUBCUTANEOUS at 23:01

## 2022-01-29 RX ADMIN — Medication 1 TABLET(S): at 05:49

## 2022-01-29 RX ADMIN — Medication 7 UNIT(S): at 13:35

## 2022-01-29 RX ADMIN — ENOXAPARIN SODIUM 30 MILLIGRAM(S): 100 INJECTION SUBCUTANEOUS at 11:17

## 2022-01-29 RX ADMIN — PANTOPRAZOLE SODIUM 40 MILLIGRAM(S): 20 TABLET, DELAYED RELEASE ORAL at 06:00

## 2022-01-29 RX ADMIN — MIRTAZAPINE 7.5 MILLIGRAM(S): 45 TABLET, ORALLY DISINTEGRATING ORAL at 22:17

## 2022-01-29 RX ADMIN — Medication 1 SPRAY(S): at 17:49

## 2022-01-29 RX ADMIN — Medication 600 MILLIGRAM(S): at 17:50

## 2022-01-29 RX ADMIN — Medication 1 TABLET(S): at 17:51

## 2022-01-29 RX ADMIN — LOSARTAN POTASSIUM 50 MILLIGRAM(S): 100 TABLET, FILM COATED ORAL at 05:48

## 2022-01-29 RX ADMIN — Medication 500000 UNIT(S): at 11:18

## 2022-01-29 RX ADMIN — MONTELUKAST 10 MILLIGRAM(S): 4 TABLET, CHEWABLE ORAL at 11:18

## 2022-01-29 RX ADMIN — KETOTIFEN FUMARATE 1 DROP(S): 0.34 SOLUTION OPHTHALMIC at 17:49

## 2022-01-29 RX ADMIN — BUDESONIDE AND FORMOTEROL FUMARATE DIHYDRATE 2 PUFF(S): 160; 4.5 AEROSOL RESPIRATORY (INHALATION) at 09:33

## 2022-01-29 RX ADMIN — Medication 7 UNIT(S): at 09:37

## 2022-01-29 RX ADMIN — KETOTIFEN FUMARATE 1 DROP(S): 0.34 SOLUTION OPHTHALMIC at 05:50

## 2022-01-29 RX ADMIN — Medication 7 UNIT(S): at 17:48

## 2022-01-29 RX ADMIN — Medication 600 MILLIGRAM(S): at 05:49

## 2022-01-29 RX ADMIN — FAMOTIDINE 40 MILLIGRAM(S): 10 INJECTION INTRAVENOUS at 22:17

## 2022-01-29 RX ADMIN — Medication 1 SPRAY(S): at 05:49

## 2022-01-29 RX ADMIN — TIOTROPIUM BROMIDE 1 CAPSULE(S): 18 CAPSULE ORAL; RESPIRATORY (INHALATION) at 09:34

## 2022-01-29 RX ADMIN — BUDESONIDE AND FORMOTEROL FUMARATE DIHYDRATE 2 PUFF(S): 160; 4.5 AEROSOL RESPIRATORY (INHALATION) at 22:17

## 2022-01-29 RX ADMIN — Medication 1 TABLET(S): at 17:52

## 2022-01-29 RX ADMIN — Medication 37.5 MILLIGRAM(S): at 11:19

## 2022-01-29 NOTE — PROGRESS NOTE ADULT - ASSESSMENT
67 yo F w/ diffuse cystic bronchiectasis and primary ciliary dyskinesia, on chronic home O2 and AVAPS qhs, ABPA, HTN, GERD, DM2 on long term insulin therapy, depression, liver cirrhosis and CBD stricture, recent admission for  pseudomonas PNA, admitted w/ acute on chronic hypoxic/hypercapnic respiratory failure 2/2 COVID19/bronchiectasis flare and concern for superimposed bacterial pneumonia, course c/b episodes of hypoglycemia.     # Acute on chronic hypoxic/hypercapnic respiratory failure, COVID19 infection, bronchiectasis, potential superimposed pneumonia  - pulm and ID input appreciated  - c/w duonebs, ATC 3% saline, aerobika device  - c/w supplemental O2, AVAPS qhs, monitor respiratory status closely, appears stable  - symptomatic management of cough  - c/w IV meropenem as per ID (while inpatient)  - ID recs appreciated, sputum cx polymicrobial w/ pseudomonas, klebsiella, elizabethkingia, sensitivities noted, elizabethkingia resistant to meropenem, levofloxacin added to regimen, per ID on discharge to continue levaquin total of 2 weeks  - s/p remdesivir 5 day course  - completed 10 day course of dexamethasone 1/28  - c/w montelukast, Spiriva, Symbicort Flonase  - pulm input appreciated, has f/u appointment 2/2 9AM w/ Dr. Young    # DM2 c/b steroid induced hyperglycemia and also by episodes of hypoglycemia  - no hypoglycemia noted overnight, PO intake also appears to be improving w/ management of mouth discomfort, change in consistency of diet  - as now off steroids, will decrease dose of lantus to 5U (from 7U) as per endocrine, c/w premeal admelog 7U tid  - continue to monitor FS and adjust basal/bolus regimen as needed, monitor FS, continue w/ sliding scale coverage  - A1C 9.4  - endocrine assistance appreciated, will f/u recs re: discharge regimen  - patient was able to demonstrate successful insulin PEN administration w/ [facility] pharmacist    # Loose BM (improving)  - reported  multiple loose bowel movements  - hold bowel regimen, reassess   - patient reports stools becoming more formed, no BMs today yet    # Essential HTN  - c/w losartan, toprol xl    # GERD  - c/w protonix, pepcid    # Depression  c/w remeron, resumed home effexor (meds from other sources indicate was prescribed effexor 37.5 mg ER, last filled in December)    # oropharyngeal thrush  - nystatin suspension as per pulm, appears to be improving, c/w symptomatic management of mouth discomfort    # Severe protein calorie malnutrition  - BMI 15.1 on 1/21  - c/w diet, advanced to minced and moist per patient request (appears to be tolerating consistency), c/w glucerna supplementation as per nutrition recs    VTE ppx: lovenox    Dispo: anticipate d/c home Monday as d/w patient and son, has home O2, pulm f/u appointment scheduled for 2/2, to complete course levaquin otpt as per ID

## 2022-01-29 NOTE — PROGRESS NOTE ADULT - SUBJECTIVE AND OBJECTIVE BOX
[facility] Medicine  Pager [phone]    Patient is a 66y old  Female who presents with a chief complaint of SOB (28 Jan 2022 13:27)      INTERVAL HPI/OVERNIGHT EVENTS:    MEDICATIONS  (STANDING):  budesonide 160 MICROgram(s)/formoterol 4.5 MICROgram(s) Inhaler 2 Puff(s) Inhalation two times a day  calcium carbonate 1250 mG  + Vitamin D (OsCal 500 + D) 1 Tablet(s) Oral two times a day  dextrose 40% Gel 15 Gram(s) Oral once  dextrose 5%. 1000 milliLiter(s) (50 mL/Hr) IV Continuous <Continuous>  dextrose 5%. 1000 milliLiter(s) (100 mL/Hr) IV Continuous <Continuous>  dextrose 50% Injectable 25 Gram(s) IV Push once  dextrose 50% Injectable 12.5 Gram(s) IV Push once  dextrose 50% Injectable 25 Gram(s) IV Push once  enoxaparin Injectable 30 milliGRAM(s) SubCutaneous daily  famotidine    Tablet 40 milliGRAM(s) Oral at bedtime  ferrous    sulfate 325 milliGRAM(s) Oral daily  fluticasone propionate 50 MICROgram(s)/spray Nasal Spray 1 Spray(s) Both Nostrils two times a day  glucagon  Injectable 1 milliGRAM(s) IntraMuscular once  guaiFENesin  milliGRAM(s) Oral every 12 hours  insulin glargine Injectable (LANTUS) 7 Unit(s) SubCutaneous at bedtime  insulin lispro (ADMELOG) corrective regimen sliding scale   SubCutaneous three times a day before meals  insulin lispro (ADMELOG) corrective regimen sliding scale   SubCutaneous at bedtime  insulin lispro Injectable (ADMELOG) 7 Unit(s) SubCutaneous three times a day before meals  ketotifen 0.025% Ophthalmic Solution 1 Drop(s) Both EYES two times a day  lactobacillus acidophilus 1 Tablet(s) Oral two times a day  levoFLOXacin  Tablet 500 milliGRAM(s) Oral every 24 hours  losartan 50 milliGRAM(s) Oral daily  metoprolol succinate ER 50 milliGRAM(s) Oral daily  mirtazapine 7.5 milliGRAM(s) Oral at bedtime  montelukast 10 milliGRAM(s) Oral daily  nystatin    Suspension 256924 Unit(s) Oral four times a day  pantoprazole    Tablet 40 milliGRAM(s) Oral before breakfast  tiotropium 18 MICROgram(s) Capsule 1 Capsule(s) Inhalation daily  venlafaxine XR. 37.5 milliGRAM(s) Oral daily    MEDICATIONS  (PRN):  acetaminophen     Tablet .. 650 milliGRAM(s) Oral every 6 hours PRN Temp greater or equal to 38C (100.4F), Mild Pain (1 - 3), Moderate Pain (4 - 6)  ALBUTerol    90 MICROgram(s) HFA Inhaler 2 Puff(s) Inhalation every 6 hours PRN Shortness of Breath and/or Wheezing  benzocaine 15 mG/menthol 3.6 mG Lozenge 1 Lozenge Oral every 6 hours PRN Sore Throat  hydrocortisone hemorrhoidal Suppository 1 Suppository(s) Rectal two times a day PRN Rectal pain/hemorrhoids  simethicone 80 milliGRAM(s) Chew every 8 hours PRN Gas      Allergies    No Known Allergies    Intolerances        REVIEW OF SYSTEMS:  Please see interval HPI:    Vital Signs Last 24 Hrs  T(C): 36.6 (29 Jan 2022 05:40), Max: 36.8 (28 Jan 2022 21:06)  T(F): 97.9 (29 Jan 2022 05:40), Max: 98.2 (28 Jan 2022 21:06)  HR: 74 (29 Jan 2022 07:53) (56 - 88)  BP: 146/58 (29 Jan 2022 05:40) (118/57 - 146/58)  BP(mean): --  RR: 18 (29 Jan 2022 05:40) (18 - 18)  SpO2: 97% (29 Jan 2022 07:53) (96% - 100%)  I&O's Detail    28 Jan 2022 07:01  -  29 Jan 2022 07:00  --------------------------------------------------------  IN:    Oral Fluid: 240 mL  Total IN: 240 mL    OUT:  Total OUT: 0 mL    Total NET: 240 mL            PHYSICAL EXAM:  GENERAL:   HEAD:    EYES:   ENMT:   NECK:   NERVOUS SYSTEM:    CHEST/LUNG:   HEART:   ABDOMEN:   EXTREMITIES:    LYMPH:   SKIN:     LABS:                        12.2   8.74  )-----------( 141      ( 29 Jan 2022 07:23 )             37.3     29 Jan 2022 07:23    142    |  101    |  19     ----------------------------<  100    4.3     |  36     |  0.33     Ca    8.7        29 Jan 2022 07:23        CAPILLARY BLOOD GLUCOSE      POCT Blood Glucose.: 127 mg/dL (29 Jan 2022 09:36)  POCT Blood Glucose.: 211 mg/dL (28 Jan 2022 22:06)  POCT Blood Glucose.: 200 mg/dL (28 Jan 2022 17:31)  POCT Blood Glucose.: 204 mg/dL (28 Jan 2022 12:44)    BLOOD CULTURE    RADIOLOGY & ADDITIONAL TESTS:    Imaging Personally Reviewed:  [ ] YES     Consultant(s) Notes Reviewed:      Care Discussed with Consultants/Other Providers: [facility] Medicine  Pager [phone]    Patient is a 66y old  Female who presents with a chief complaint of SOB (28 Jan 2022 13:27)      INTERVAL HPI/OVERNIGHT EVENTS:  Son Alejandra providing Leandro translation per patient request.   Patient reports still w/some mouth discomfort but able to eat better, liking change in consistency of diet, is working for her. Respiratory status stable, aware of pulm followup appt w/ Dr. Young. Reports no BM yet today, (stool was more formed yesterday), hopeful that diarrhea resolved. In terms of dispo, patient and son don't feel that she is ready for discharge just yet, would be more amenable to discharge home Monday. Requests nursing assistance getting washed.     MEDICATIONS  (STANDING):  budesonide 160 MICROgram(s)/formoterol 4.5 MICROgram(s) Inhaler 2 Puff(s) Inhalation two times a day  calcium carbonate 1250 mG  + Vitamin D (OsCal 500 + D) 1 Tablet(s) Oral two times a day  dextrose 40% Gel 15 Gram(s) Oral once  dextrose 5%. 1000 milliLiter(s) (50 mL/Hr) IV Continuous <Continuous>  dextrose 5%. 1000 milliLiter(s) (100 mL/Hr) IV Continuous <Continuous>  dextrose 50% Injectable 25 Gram(s) IV Push once  dextrose 50% Injectable 12.5 Gram(s) IV Push once  dextrose 50% Injectable 25 Gram(s) IV Push once  enoxaparin Injectable 30 milliGRAM(s) SubCutaneous daily  famotidine    Tablet 40 milliGRAM(s) Oral at bedtime  ferrous    sulfate 325 milliGRAM(s) Oral daily  fluticasone propionate 50 MICROgram(s)/spray Nasal Spray 1 Spray(s) Both Nostrils two times a day  glucagon  Injectable 1 milliGRAM(s) IntraMuscular once  guaiFENesin  milliGRAM(s) Oral every 12 hours  insulin glargine Injectable (LANTUS) 7 Unit(s) SubCutaneous at bedtime  insulin lispro (ADMELOG) corrective regimen sliding scale   SubCutaneous three times a day before meals  insulin lispro (ADMELOG) corrective regimen sliding scale   SubCutaneous at bedtime  insulin lispro Injectable (ADMELOG) 7 Unit(s) SubCutaneous three times a day before meals  ketotifen 0.025% Ophthalmic Solution 1 Drop(s) Both EYES two times a day  lactobacillus acidophilus 1 Tablet(s) Oral two times a day  levoFLOXacin  Tablet 500 milliGRAM(s) Oral every 24 hours  losartan 50 milliGRAM(s) Oral daily  metoprolol succinate ER 50 milliGRAM(s) Oral daily  mirtazapine 7.5 milliGRAM(s) Oral at bedtime  montelukast 10 milliGRAM(s) Oral daily  nystatin    Suspension 520041 Unit(s) Oral four times a day  pantoprazole    Tablet 40 milliGRAM(s) Oral before breakfast  tiotropium 18 MICROgram(s) Capsule 1 Capsule(s) Inhalation daily  venlafaxine XR. 37.5 milliGRAM(s) Oral daily    MEDICATIONS  (PRN):  acetaminophen     Tablet .. 650 milliGRAM(s) Oral every 6 hours PRN Temp greater or equal to 38C (100.4F), Mild Pain (1 - 3), Moderate Pain (4 - 6)  ALBUTerol    90 MICROgram(s) HFA Inhaler 2 Puff(s) Inhalation every 6 hours PRN Shortness of Breath and/or Wheezing  benzocaine 15 mG/menthol 3.6 mG Lozenge 1 Lozenge Oral every 6 hours PRN Sore Throat  hydrocortisone hemorrhoidal Suppository 1 Suppository(s) Rectal two times a day PRN Rectal pain/hemorrhoids  simethicone 80 milliGRAM(s) Chew every 8 hours PRN Gas    Allergies  No Known Allergies    Intolerances    REVIEW OF SYSTEMS:  Please see interval HPI:    Vital Signs Last 24 Hrs  T(C): 36.6 (29 Jan 2022 05:40), Max: 36.8 (28 Jan 2022 21:06)  T(F): 97.9 (29 Jan 2022 05:40), Max: 98.2 (28 Jan 2022 21:06)  HR: 74 (29 Jan 2022 07:53) (56 - 88)  BP: 146/58 (29 Jan 2022 05:40) (118/57 - 146/58)  BP(mean): --  RR: 18 (29 Jan 2022 05:40) (18 - 18)  SpO2: 97% (29 Jan 2022 07:53) (96% - 100%)  I&O's Detail    28 Jan 2022 07:01  -  29 Jan 2022 07:00  --------------------------------------------------------  IN:    Oral Fluid: 240 mL  Total IN: 240 mL    OUT:  Total OUT: 0 mL    Total NET: 240 mL    PHYSICAL EXAM:  GENERAL: NAD, sitting in bed, thin  HEAD:  NC/AT  EYES: EOMI, clear sclera/conjunctiva  ENMT: MMM, thrush appears to improving  NECK: supple  NERVOUS SYSTEM: moving extremities, non-focal   CHEST/LUNG: NC in place, no respiratory distress on supplemental O2, coarse BS  HEART: S1S2 RRR  ABDOMEN: soft, non-tender  EXTREMITIES:  no c/c/e    LABS:                        12.2   8.74  )-----------( 141      ( 29 Jan 2022 07:23 )             37.3     29 Jan 2022 07:23    142    |  101    |  19     ----------------------------<  100    4.3     |  36     |  0.33     Ca    8.7        29 Jan 2022 07:23    CAPILLARY BLOOD GLUCOSE  POCT Blood Glucose.: 127 mg/dL (29 Jan 2022 09:36)  POCT Blood Glucose.: 211 mg/dL (28 Jan 2022 22:06)  POCT Blood Glucose.: 200 mg/dL (28 Jan 2022 17:31)  POCT Blood Glucose.: 204 mg/dL (28 Jan 2022 12:44)    BLOOD CULTURE    RADIOLOGY & ADDITIONAL TESTS:    Imaging Personally Reviewed:  [ ] YES     Consultant(s) Notes Reviewed:  Endocrine    Care Discussed with Consultants/Other Providers: LION Romero re: patient eating better, requests d/c home Monday, son in agreement w/ plan

## 2022-01-29 NOTE — CHART NOTE - NSCHARTNOTEFT_GEN_A_CORE
67 y/o Leandro speaking Female, with a PmHx of Diffuse Cystic Bronchiectasis and Primary Ciliary Dyskinesia on 2L NC QD and AVAPS QHS, Chronic Sinusitis, ABPA, HTN, GERD, IDDM2, Depression, Nodular Liver and CBD stricture s/p PTC  and admissions for PNA in past, presented to the [facility] ED for SOB. Pt is unvaccinated currently being treated for COVID. Endocrine called for DM assistance, s/p Dexamethasone and Remdesvir     BG, insulin administration and chart reviewed  Dexamethasone 6 mg now STOPPED, last dose 1/28  Recommend to reduce Lantus to 5 units SQ qHS   For now, continue Admelog 7 units SQ TID before meals (Hold if NPO/not eating meal) - may need to decrease dose based on further FS data today  Continue Admelog moderate dose scale before meals, low dose at bedtime  Check BG before meals and bedtime  Consistent carbohydrate diet  RD consult completed   Hypoglycemia protocol   DM Education: Successful insulin PEN return demonstration done with [facility] pharmacist 1/27, patient's family also assists her with injections at home    Discharge Plan:   Resume basal/bolus insulin PENS (Lantus and Novolog), dose TBD  Please send Freestyle Tessie 2 CGM to assess insurance coverage   Ensure she has glucometer, glucose test strips, lancets, alcohol swabs and insulin PEN needles  Would also recommend glucagon kit for home treatment of hypoglycemia - or can use Gvoke, Baqsimi - discussed with medicine team on 1/28  Followup with PCP, optho, podiatry   For endocrine followup, can schedule at [facility]: [phone] [facility] [address], [phone]    CAPILLARY BLOOD GLUCOSE    POCT Blood Glucose.: 127 mg/dL (29 Jan 2022 09:36)  POCT Blood Glucose.: 211 mg/dL (28 Jan 2022 22:06)  POCT Blood Glucose.: 200 mg/dL (28 Jan 2022 17:31)      01-29    142  |  101  |  19  ----------------------------<  100<H>  4.3   |  36<H>  |  0.33<L>    Ca    8.7      29 Jan 2022 07:23      MEDICATIONS  (STANDING):  budesonide 160 MICROgram(s)/formoterol 4.5 MICROgram(s) Inhaler 2 Puff(s) Inhalation two times a day  calcium carbonate 1250 mG  + Vitamin D (OsCal 500 + D) 1 Tablet(s) Oral two times a day  dextrose 40% Gel 15 Gram(s) Oral once  dextrose 5%. 1000 milliLiter(s) (50 mL/Hr) IV Continuous <Continuous>  dextrose 5%. 1000 milliLiter(s) (100 mL/Hr) IV Continuous <Continuous>  dextrose 50% Injectable 25 Gram(s) IV Push once  dextrose 50% Injectable 12.5 Gram(s) IV Push once  dextrose 50% Injectable 25 Gram(s) IV Push once  enoxaparin Injectable 30 milliGRAM(s) SubCutaneous daily  famotidine    Tablet 40 milliGRAM(s) Oral at bedtime  ferrous    sulfate 325 milliGRAM(s) Oral daily  fluticasone propionate 50 MICROgram(s)/spray Nasal Spray 1 Spray(s) Both Nostrils two times a day  glucagon  Injectable 1 milliGRAM(s) IntraMuscular once  guaiFENesin  milliGRAM(s) Oral every 12 hours  insulin glargine Injectable (LANTUS) 7 Unit(s) SubCutaneous at bedtime  insulin lispro (ADMELOG) corrective regimen sliding scale   SubCutaneous at bedtime  insulin lispro (ADMELOG) corrective regimen sliding scale   SubCutaneous three times a day before meals  insulin lispro Injectable (ADMELOG) 7 Unit(s) SubCutaneous three times a day before meals  ketotifen 0.025% Ophthalmic Solution 1 Drop(s) Both EYES two times a day  lactobacillus acidophilus 1 Tablet(s) Oral two times a day  levoFLOXacin  Tablet 500 milliGRAM(s) Oral every 24 hours  losartan 50 milliGRAM(s) Oral daily  metoprolol succinate ER 50 milliGRAM(s) Oral daily  mirtazapine 7.5 milliGRAM(s) Oral at bedtime  montelukast 10 milliGRAM(s) Oral daily  nystatin    Suspension 959683 Unit(s) Oral four times a day  pantoprazole    Tablet 40 milliGRAM(s) Oral before breakfast  tiotropium 18 MICROgram(s) Capsule 1 Capsule(s) Inhalation daily  venlafaxine XR. 37.5 milliGRAM(s) Oral daily    A1C with Estimated Average Glucose Result: 9.4 % (01-21-22 @ 14:26)  A1C with Estimated Average Glucose Result: 8.9 % (06-30-21 @ 07:28)  A1C with Estimated Average Glucose Result: 8.5 % (04-08-21 @ 07:36)  A1C with Estimated Average Glucose Result: 8.2 % (04-01-21 @ 15:25)    Sabrina Plaza  Nurse Practitioner  Division of Endocrinology & Diabetes  In house pager #[phone]/long range pager #[phone]    If before 9AM or after 6PM, or on weekends/holidays, please call endocrine answering service for assistance ([phone]).  For nonurgent matters email [email or web address] for assistance. 65 y/o Leandro speaking Female, with a PmHx of Diffuse Cystic Bronchiectasis and Primary Ciliary Dyskinesia on 2L NC QD and AVAPS QHS, Chronic Sinusitis, ABPA, HTN, GERD, IDDM2, Depression, Nodular Liver and CBD stricture s/p PTC  and admissions for PNA in past, presented to the [facility] ED for SOB. Pt is unvaccinated currently being treated for COVID. Endocrine called for DM assistance, s/p Dexamethasone and Remdesvir     BG, insulin administration and chart reviewed  Dexamethasone 6 mg now STOPPED, last dose 1/28  Recommend to reduce Lantus to 5 units SQ qHS   For now, continue Admelog 7 units SQ TID before meals (Hold if NPO/not eating meal) - may need to decrease dose based on further FS data today  Recommend to reduce Admelog correctional scale to LOW dose before meals, continue low dose at bedtime  Check BG before meals and bedtime  Consistent carbohydrate diet  RD consult completed   Hypoglycemia protocol   DM Education: Successful insulin PEN return demonstration done with [facility] pharmacist 1/27, patient's family also assists her with injections at home    Discharge Plan:   Resume basal/bolus insulin PENS (Lantus and Novolog), dose TBD  Please send Freestyle Tessie 2 CGM to assess insurance coverage   Ensure she has glucometer, glucose test strips, lancets, alcohol swabs and insulin PEN needles  Would also recommend glucagon kit for home treatment of hypoglycemia - or can use Gvoke, Baqsimi - discussed with medicine team on 1/28  Followup with PCP, optho, podiatry   For endocrine followup, can schedule at [facility]: [phone] [facility] [address], [phone]    CAPILLARY BLOOD GLUCOSE    POCT Blood Glucose.: 127 mg/dL (29 Jan 2022 09:36)  POCT Blood Glucose.: 211 mg/dL (28 Jan 2022 22:06)  POCT Blood Glucose.: 200 mg/dL (28 Jan 2022 17:31)      01-29    142  |  101  |  19  ----------------------------<  100<H>  4.3   |  36<H>  |  0.33<L>    Ca    8.7      29 Jan 2022 07:23      MEDICATIONS  (STANDING):  budesonide 160 MICROgram(s)/formoterol 4.5 MICROgram(s) Inhaler 2 Puff(s) Inhalation two times a day  calcium carbonate 1250 mG  + Vitamin D (OsCal 500 + D) 1 Tablet(s) Oral two times a day  dextrose 40% Gel 15 Gram(s) Oral once  dextrose 5%. 1000 milliLiter(s) (50 mL/Hr) IV Continuous <Continuous>  dextrose 5%. 1000 milliLiter(s) (100 mL/Hr) IV Continuous <Continuous>  dextrose 50% Injectable 25 Gram(s) IV Push once  dextrose 50% Injectable 12.5 Gram(s) IV Push once  dextrose 50% Injectable 25 Gram(s) IV Push once  enoxaparin Injectable 30 milliGRAM(s) SubCutaneous daily  famotidine    Tablet 40 milliGRAM(s) Oral at bedtime  ferrous    sulfate 325 milliGRAM(s) Oral daily  fluticasone propionate 50 MICROgram(s)/spray Nasal Spray 1 Spray(s) Both Nostrils two times a day  glucagon  Injectable 1 milliGRAM(s) IntraMuscular once  guaiFENesin  milliGRAM(s) Oral every 12 hours  insulin glargine Injectable (LANTUS) 7 Unit(s) SubCutaneous at bedtime  insulin lispro (ADMELOG) corrective regimen sliding scale   SubCutaneous at bedtime  insulin lispro (ADMELOG) corrective regimen sliding scale   SubCutaneous three times a day before meals  insulin lispro Injectable (ADMELOG) 7 Unit(s) SubCutaneous three times a day before meals  ketotifen 0.025% Ophthalmic Solution 1 Drop(s) Both EYES two times a day  lactobacillus acidophilus 1 Tablet(s) Oral two times a day  levoFLOXacin  Tablet 500 milliGRAM(s) Oral every 24 hours  losartan 50 milliGRAM(s) Oral daily  metoprolol succinate ER 50 milliGRAM(s) Oral daily  mirtazapine 7.5 milliGRAM(s) Oral at bedtime  montelukast 10 milliGRAM(s) Oral daily  nystatin    Suspension 596075 Unit(s) Oral four times a day  pantoprazole    Tablet 40 milliGRAM(s) Oral before breakfast  tiotropium 18 MICROgram(s) Capsule 1 Capsule(s) Inhalation daily  venlafaxine XR. 37.5 milliGRAM(s) Oral daily    A1C with Estimated Average Glucose Result: 9.4 % (01-21-22 @ 14:26)  A1C with Estimated Average Glucose Result: 8.9 % (06-30-21 @ 07:28)  A1C with Estimated Average Glucose Result: 8.5 % (04-08-21 @ 07:36)  A1C with Estimated Average Glucose Result: 8.2 % (04-01-21 @ 15:25)    Sabrina Plaza  Nurse Practitioner  Division of Endocrinology & Diabetes  In house pager #[phone]/long range pager #[phone]    If before 9AM or after 6PM, or on weekends/holidays, please call endocrine answering service for assistance ([phone]).  For nonurgent matters email [email or web address] for assistance. 65 y/o Leandro speaking Female, with a PmHx of Diffuse Cystic Bronchiectasis and Primary Ciliary Dyskinesia on 2L NC QD and AVAPS QHS, Chronic Sinusitis, ABPA, HTN, GERD, IDDM2, Depression, Nodular Liver and CBD stricture s/p PTC  and admissions for PNA in past, presented to the [facility] ED for SOB. Pt is unvaccinated currently being treated for COVID. Endocrine called for DM assistance, s/p Dexamethasone and Remdesvir     BG, insulin administration and chart reviewed  Dexamethasone 6 mg now STOPPED, last dose 1/28  Recommend to reduce Lantus to 5 units SQ qHS   For now, continue Admelog 7 units SQ TID before meals (Hold if NPO/not eating meal) - may need to decrease dose based on further FS data today  ADDENDUM - reduce Admelog to 5 units TID   Recommend to reduce Admelog correctional scale to LOW dose before meals, continue low dose at bedtime  Check BG before meals and bedtime  Consistent carbohydrate diet  RD consult completed   Hypoglycemia protocol   DM Education: Successful insulin PEN return demonstration done with [facility] pharmacist 1/27, patient's family also assists her with injections at home    Discharge Plan:   Resume basal/bolus insulin PENS (Lantus and Novolog), dose TBD  Please send Freestyle Tessie 2 CGM to assess insurance coverage   Ensure she has glucometer, glucose test strips, lancets, alcohol swabs and insulin PEN needles  Would also recommend glucagon kit for home treatment of hypoglycemia - or can use Gvoke, Baqsimi - discussed with medicine team on 1/28  Followup with PCP, optho, podiatry   For endocrine followup, can schedule at [facility]: [phone] [facility] [address], [phone]    CAPILLARY BLOOD GLUCOSE    POCT Blood Glucose.: 127 mg/dL (29 Jan 2022 09:36)  POCT Blood Glucose.: 211 mg/dL (28 Jan 2022 22:06)  POCT Blood Glucose.: 200 mg/dL (28 Jan 2022 17:31)      01-29    142  |  101  |  19  ----------------------------<  100<H>  4.3   |  36<H>  |  0.33<L>    Ca    8.7      29 Jan 2022 07:23      MEDICATIONS  (STANDING):  budesonide 160 MICROgram(s)/formoterol 4.5 MICROgram(s) Inhaler 2 Puff(s) Inhalation two times a day  calcium carbonate 1250 mG  + Vitamin D (OsCal 500 + D) 1 Tablet(s) Oral two times a day  dextrose 40% Gel 15 Gram(s) Oral once  dextrose 5%. 1000 milliLiter(s) (50 mL/Hr) IV Continuous <Continuous>  dextrose 5%. 1000 milliLiter(s) (100 mL/Hr) IV Continuous <Continuous>  dextrose 50% Injectable 25 Gram(s) IV Push once  dextrose 50% Injectable 12.5 Gram(s) IV Push once  dextrose 50% Injectable 25 Gram(s) IV Push once  enoxaparin Injectable 30 milliGRAM(s) SubCutaneous daily  famotidine    Tablet 40 milliGRAM(s) Oral at bedtime  ferrous    sulfate 325 milliGRAM(s) Oral daily  fluticasone propionate 50 MICROgram(s)/spray Nasal Spray 1 Spray(s) Both Nostrils two times a day  glucagon  Injectable 1 milliGRAM(s) IntraMuscular once  guaiFENesin  milliGRAM(s) Oral every 12 hours  insulin glargine Injectable (LANTUS) 7 Unit(s) SubCutaneous at bedtime  insulin lispro (ADMELOG) corrective regimen sliding scale   SubCutaneous at bedtime  insulin lispro (ADMELOG) corrective regimen sliding scale   SubCutaneous three times a day before meals  insulin lispro Injectable (ADMELOG) 7 Unit(s) SubCutaneous three times a day before meals  ketotifen 0.025% Ophthalmic Solution 1 Drop(s) Both EYES two times a day  lactobacillus acidophilus 1 Tablet(s) Oral two times a day  levoFLOXacin  Tablet 500 milliGRAM(s) Oral every 24 hours  losartan 50 milliGRAM(s) Oral daily  metoprolol succinate ER 50 milliGRAM(s) Oral daily  mirtazapine 7.5 milliGRAM(s) Oral at bedtime  montelukast 10 milliGRAM(s) Oral daily  nystatin    Suspension 100090 Unit(s) Oral four times a day  pantoprazole    Tablet 40 milliGRAM(s) Oral before breakfast  tiotropium 18 MICROgram(s) Capsule 1 Capsule(s) Inhalation daily  venlafaxine XR. 37.5 milliGRAM(s) Oral daily    A1C with Estimated Average Glucose Result: 9.4 % (01-21-22 @ 14:26)  A1C with Estimated Average Glucose Result: 8.9 % (06-30-21 @ 07:28)  A1C with Estimated Average Glucose Result: 8.5 % (04-08-21 @ 07:36)  A1C with Estimated Average Glucose Result: 8.2 % (04-01-21 @ 15:25)    Sabrina Plaza  Nurse Practitioner  Division of Endocrinology & Diabetes  In house pager #[phone]/long range pager #[phone]    If before 9AM or after 6PM, or on weekends/holidays, please call endocrine answering service for assistance ([phone]).  For nonurgent matters email [email or web address] for assistance.

## 2022-01-29 NOTE — CHART NOTE - NSCHARTNOTEFT_GEN_A_CORE
Medicine PA Episodic Note    Notified by RN of pt. having blood glucose of 63 and repeat 64. Patient asymptomatic and VSS.    # Hypoglycemia   > Hypoglycemia protocol initiated  > Monitor FS closely  > Encourage PO intake  > Monitor for s/s of hypo/hyperglycemia   > Monitor VS  > F/u AM labs     Repeat f/s s/p hypoglycemia protocol->120        Staci Mares  Department of Medicine [id number]

## 2022-01-30 DIAGNOSIS — R19.5 OTHER FECAL ABNORMALITIES: ICD-10-CM

## 2022-01-30 DIAGNOSIS — B37.0 CANDIDAL STOMATITIS: ICD-10-CM

## 2022-01-30 DIAGNOSIS — E43 UNSPECIFIED SEVERE PROTEIN-CALORIE MALNUTRITION: ICD-10-CM

## 2022-01-30 DIAGNOSIS — J96.21 ACUTE AND CHRONIC RESPIRATORY FAILURE WITH HYPOXIA: ICD-10-CM

## 2022-01-30 LAB
ANION GAP SERPL CALC-SCNC: 4 MMOL/L — LOW (ref 7–14)
BUN SERPL-MCNC: 15 MG/DL — SIGNIFICANT CHANGE UP (ref 7–23)
CALCIUM SERPL-MCNC: 8.8 MG/DL — SIGNIFICANT CHANGE UP (ref 8.4–10.5)
CHLORIDE SERPL-SCNC: 99 MMOL/L — SIGNIFICANT CHANGE UP (ref 98–107)
CO2 SERPL-SCNC: 36 MMOL/L — HIGH (ref 22–31)
CREAT SERPL-MCNC: 0.33 MG/DL — LOW (ref 0.5–1.3)
GLUCOSE SERPL-MCNC: 127 MG/DL — HIGH (ref 70–99)
HCT VFR BLD CALC: 40.3 % — SIGNIFICANT CHANGE UP (ref 34.5–45)
HGB BLD-MCNC: 12.1 G/DL — SIGNIFICANT CHANGE UP (ref 11.5–15.5)
MAGNESIUM SERPL-MCNC: 2 MG/DL — SIGNIFICANT CHANGE UP (ref 1.6–2.6)
MCHC RBC-ENTMCNC: 27.9 PG — SIGNIFICANT CHANGE UP (ref 27–34)
MCHC RBC-ENTMCNC: 30 GM/DL — LOW (ref 32–36)
MCV RBC AUTO: 92.9 FL — SIGNIFICANT CHANGE UP (ref 80–100)
NRBC # BLD: 0 /100 WBCS — SIGNIFICANT CHANGE UP
NRBC # FLD: 0 K/UL — SIGNIFICANT CHANGE UP
PHOSPHATE SERPL-MCNC: 2.9 MG/DL — SIGNIFICANT CHANGE UP (ref 2.5–4.5)
PLATELET # BLD AUTO: 148 K/UL — LOW (ref 150–400)
POTASSIUM SERPL-MCNC: 4.1 MMOL/L — SIGNIFICANT CHANGE UP (ref 3.5–5.3)
POTASSIUM SERPL-SCNC: 4.1 MMOL/L — SIGNIFICANT CHANGE UP (ref 3.5–5.3)
RBC # BLD: 4.34 M/UL — SIGNIFICANT CHANGE UP (ref 3.8–5.2)
RBC # FLD: 13.4 % — SIGNIFICANT CHANGE UP (ref 10.3–14.5)
SODIUM SERPL-SCNC: 139 MMOL/L — SIGNIFICANT CHANGE UP (ref 135–145)
WBC # BLD: 8.3 K/UL — SIGNIFICANT CHANGE UP (ref 3.8–10.5)
WBC # FLD AUTO: 8.3 K/UL — SIGNIFICANT CHANGE UP (ref 3.8–10.5)

## 2022-01-30 PROCEDURE — 99232 SBSQ HOSP IP/OBS MODERATE 35: CPT

## 2022-01-30 RX ORDER — INSULIN GLARGINE 100 [IU]/ML
4 INJECTION, SOLUTION SUBCUTANEOUS ONCE
Refills: 0 | Status: DISCONTINUED | OUTPATIENT
Start: 2022-01-30 | End: 2022-01-30

## 2022-01-30 RX ADMIN — BUDESONIDE AND FORMOTEROL FUMARATE DIHYDRATE 2 PUFF(S): 160; 4.5 AEROSOL RESPIRATORY (INHALATION) at 12:28

## 2022-01-30 RX ADMIN — Medication 600 MILLIGRAM(S): at 07:06

## 2022-01-30 RX ADMIN — Medication 1 TABLET(S): at 07:07

## 2022-01-30 RX ADMIN — Medication 1 TABLET(S): at 18:09

## 2022-01-30 RX ADMIN — Medication 500000 UNIT(S): at 07:04

## 2022-01-30 RX ADMIN — TIOTROPIUM BROMIDE 1 CAPSULE(S): 18 CAPSULE ORAL; RESPIRATORY (INHALATION) at 12:28

## 2022-01-30 RX ADMIN — SIMETHICONE 80 MILLIGRAM(S): 80 TABLET, CHEWABLE ORAL at 07:06

## 2022-01-30 RX ADMIN — BUDESONIDE AND FORMOTEROL FUMARATE DIHYDRATE 2 PUFF(S): 160; 4.5 AEROSOL RESPIRATORY (INHALATION) at 22:42

## 2022-01-30 RX ADMIN — KETOTIFEN FUMARATE 1 DROP(S): 0.34 SOLUTION OPHTHALMIC at 07:08

## 2022-01-30 RX ADMIN — Medication 5 UNIT(S): at 18:10

## 2022-01-30 RX ADMIN — Medication 1 TABLET(S): at 07:05

## 2022-01-30 RX ADMIN — LOSARTAN POTASSIUM 50 MILLIGRAM(S): 100 TABLET, FILM COATED ORAL at 07:07

## 2022-01-30 RX ADMIN — Medication 37.5 MILLIGRAM(S): at 12:30

## 2022-01-30 RX ADMIN — INSULIN GLARGINE 5 UNIT(S): 100 INJECTION, SOLUTION SUBCUTANEOUS at 22:52

## 2022-01-30 RX ADMIN — KETOTIFEN FUMARATE 1 DROP(S): 0.34 SOLUTION OPHTHALMIC at 18:09

## 2022-01-30 RX ADMIN — Medication 5 UNIT(S): at 09:45

## 2022-01-30 RX ADMIN — Medication 5 UNIT(S): at 12:43

## 2022-01-30 RX ADMIN — Medication 500000 UNIT(S): at 12:29

## 2022-01-30 RX ADMIN — PANTOPRAZOLE SODIUM 40 MILLIGRAM(S): 20 TABLET, DELAYED RELEASE ORAL at 07:05

## 2022-01-30 RX ADMIN — Medication 1 SPRAY(S): at 07:08

## 2022-01-30 RX ADMIN — Medication 325 MILLIGRAM(S): at 12:30

## 2022-01-30 RX ADMIN — Medication 500000 UNIT(S): at 00:21

## 2022-01-30 RX ADMIN — FAMOTIDINE 40 MILLIGRAM(S): 10 INJECTION INTRAVENOUS at 22:53

## 2022-01-30 RX ADMIN — Medication 500000 UNIT(S): at 18:08

## 2022-01-30 RX ADMIN — MONTELUKAST 10 MILLIGRAM(S): 4 TABLET, CHEWABLE ORAL at 12:30

## 2022-01-30 RX ADMIN — Medication 1 SPRAY(S): at 18:09

## 2022-01-30 RX ADMIN — Medication 50 MILLIGRAM(S): at 07:06

## 2022-01-30 RX ADMIN — MIRTAZAPINE 7.5 MILLIGRAM(S): 45 TABLET, ORALLY DISINTEGRATING ORAL at 22:53

## 2022-01-30 RX ADMIN — Medication 3: at 12:43

## 2022-01-30 RX ADMIN — Medication 5: at 18:09

## 2022-01-30 RX ADMIN — ENOXAPARIN SODIUM 30 MILLIGRAM(S): 100 INJECTION SUBCUTANEOUS at 12:30

## 2022-01-30 NOTE — PROGRESS NOTE ADULT - PROBLEM SELECTOR PLAN 2
# DM2 c/b steroid induced hyperglycemia and also by episodes of hypoglycemia  - PO intake also appears to be improving w/ management of mouth discomfort, change in consistency of diet, however still with episode of hypoglycemia last night  - Lantus dose was recently decreased from 7U to 5U, premeal admelog to 5U tid, as per endocrine as patient now off steroids, f/u endocrine recs regarding need to decrease regimen further   - continue to monitor FS and adjust basal/bolus regimen as needed, monitor FS, continue w/ sliding scale coverage  - A1C 9.4  - endocrine assistance appreciated, will f/u recs re: discharge regimen  - patient was able to demonstrate successful insulin PEN administration w/ [facility] pharmacist

## 2022-01-30 NOTE — PROGRESS NOTE ADULT - SUBJECTIVE AND OBJECTIVE BOX
[facility] Medicine  Pager [phone]    Patient is a 66y old  Female who presents with a chief complaint of SOB (29 Jan 2022 11:40)      INTERVAL HPI/OVERNIGHT EVENTS:    MEDICATIONS  (STANDING):  budesonide 160 MICROgram(s)/formoterol 4.5 MICROgram(s) Inhaler 2 Puff(s) Inhalation two times a day  calcium carbonate 1250 mG  + Vitamin D (OsCal 500 + D) 1 Tablet(s) Oral two times a day  dextrose 40% Gel 15 Gram(s) Oral once  dextrose 5%. 1000 milliLiter(s) (50 mL/Hr) IV Continuous <Continuous>  dextrose 5%. 1000 milliLiter(s) (100 mL/Hr) IV Continuous <Continuous>  dextrose 50% Injectable 25 Gram(s) IV Push once  dextrose 50% Injectable 12.5 Gram(s) IV Push once  dextrose 50% Injectable 25 Gram(s) IV Push once  enoxaparin Injectable 30 milliGRAM(s) SubCutaneous daily  famotidine    Tablet 40 milliGRAM(s) Oral at bedtime  ferrous    sulfate 325 milliGRAM(s) Oral daily  fluticasone propionate 50 MICROgram(s)/spray Nasal Spray 1 Spray(s) Both Nostrils two times a day  glucagon  Injectable 1 milliGRAM(s) IntraMuscular once  insulin glargine Injectable (LANTUS) 5 Unit(s) SubCutaneous at bedtime  insulin lispro (ADMELOG) corrective regimen sliding scale   SubCutaneous three times a day before meals  insulin lispro (ADMELOG) corrective regimen sliding scale   SubCutaneous at bedtime  insulin lispro Injectable (ADMELOG) 5 Unit(s) SubCutaneous three times a day before meals  ketotifen 0.025% Ophthalmic Solution 1 Drop(s) Both EYES two times a day  lactobacillus acidophilus 1 Tablet(s) Oral two times a day  levoFLOXacin  Tablet 500 milliGRAM(s) Oral every 24 hours  losartan 50 milliGRAM(s) Oral daily  metoprolol succinate ER 50 milliGRAM(s) Oral daily  mirtazapine 7.5 milliGRAM(s) Oral at bedtime  montelukast 10 milliGRAM(s) Oral daily  nystatin    Suspension 460100 Unit(s) Oral four times a day  pantoprazole    Tablet 40 milliGRAM(s) Oral before breakfast  tiotropium 18 MICROgram(s) Capsule 1 Capsule(s) Inhalation daily  venlafaxine XR. 37.5 milliGRAM(s) Oral daily    MEDICATIONS  (PRN):  acetaminophen     Tablet .. 650 milliGRAM(s) Oral every 6 hours PRN Temp greater or equal to 38C (100.4F), Mild Pain (1 - 3), Moderate Pain (4 - 6)  ALBUTerol    90 MICROgram(s) HFA Inhaler 2 Puff(s) Inhalation every 6 hours PRN Shortness of Breath and/or Wheezing  benzocaine 15 mG/menthol 3.6 mG Lozenge 1 Lozenge Oral every 6 hours PRN Sore Throat  hydrocortisone hemorrhoidal Suppository 1 Suppository(s) Rectal two times a day PRN Rectal pain/hemorrhoids  simethicone 80 milliGRAM(s) Chew every 8 hours PRN Gas      Allergies    No Known Allergies    Intolerances        REVIEW OF SYSTEMS:  Please see interval HPI:    Vital Signs Last 24 Hrs  T(C): 36.5 (30 Jan 2022 06:00), Max: 36.7 (29 Jan 2022 13:52)  T(F): 97.7 (30 Jan 2022 06:00), Max: 98 (29 Jan 2022 13:52)  HR: 66 (30 Jan 2022 08:41) (60 - 94)  BP: 123/63 (30 Jan 2022 06:00) (106/61 - 151/60)  BP(mean): --  RR: 18 (30 Jan 2022 06:00) (16 - 18)  SpO2: 98% (30 Jan 2022 08:41) (95% - 100%)  I&O's Detail        PHYSICAL EXAM:  GENERAL:   HEAD:    EYES:   ENMT:   NECK:   NERVOUS SYSTEM:    CHEST/LUNG:   HEART:   ABDOMEN:   EXTREMITIES:    LYMPH:   SKIN:     LABS:                        12.1   8.30  )-----------( 148      ( 30 Jan 2022 07:35 )             40.3     30 Jan 2022 07:35    139    |  99     |  15     ----------------------------<  127    4.1     |  36     |  0.33     Ca    8.8        30 Jan 2022 07:35  Phos  2.9       30 Jan 2022 07:35  Mg     2.00      30 Jan 2022 07:35        CAPILLARY BLOOD GLUCOSE      POCT Blood Glucose.: 145 mg/dL (30 Jan 2022 09:24)  POCT Blood Glucose.: 120 mg/dL (29 Jan 2022 22:41)  POCT Blood Glucose.: 64 mg/dL (29 Jan 2022 22:17)  POCT Blood Glucose.: 63 mg/dL (29 Jan 2022 22:15)  POCT Blood Glucose.: 100 mg/dL (29 Jan 2022 17:33)  POCT Blood Glucose.: 102 mg/dL (29 Jan 2022 13:30)    BLOOD CULTURE    RADIOLOGY & ADDITIONAL TESTS:    Imaging Personally Reviewed:  [ ] YES     Consultant(s) Notes Reviewed:      Care Discussed with Consultants/Other Providers: [facility] Medicine  Pager [phone]    Patient is a 66y old  Female who presents with a chief complaint of SOB (29 Jan 2022 11:40)      INTERVAL HPI/OVERNIGHT EVENTS:  With episode of hypoglycemia last night, resolved. [facility] interpretation provided by son Alejandra per patient request.   Patient currently overall doing well, bit better than yesterday, breathing is stable, mouth discomfort still present but improving, ate breakfast, requests nursing assistance with going to the bathroom, no other concerns at this time, is anticipating discharge home tomorrow. Patient and son in agreement with plan.     MEDICATIONS  (STANDING):  budesonide 160 MICROgram(s)/formoterol 4.5 MICROgram(s) Inhaler 2 Puff(s) Inhalation two times a day  calcium carbonate 1250 mG  + Vitamin D (OsCal 500 + D) 1 Tablet(s) Oral two times a day  dextrose 40% Gel 15 Gram(s) Oral once  dextrose 5%. 1000 milliLiter(s) (50 mL/Hr) IV Continuous <Continuous>  dextrose 5%. 1000 milliLiter(s) (100 mL/Hr) IV Continuous <Continuous>  dextrose 50% Injectable 25 Gram(s) IV Push once  dextrose 50% Injectable 12.5 Gram(s) IV Push once  dextrose 50% Injectable 25 Gram(s) IV Push once  enoxaparin Injectable 30 milliGRAM(s) SubCutaneous daily  famotidine    Tablet 40 milliGRAM(s) Oral at bedtime  ferrous    sulfate 325 milliGRAM(s) Oral daily  fluticasone propionate 50 MICROgram(s)/spray Nasal Spray 1 Spray(s) Both Nostrils two times a day  glucagon  Injectable 1 milliGRAM(s) IntraMuscular once  insulin glargine Injectable (LANTUS) 5 Unit(s) SubCutaneous at bedtime  insulin lispro (ADMELOG) corrective regimen sliding scale   SubCutaneous three times a day before meals  insulin lispro (ADMELOG) corrective regimen sliding scale   SubCutaneous at bedtime  insulin lispro Injectable (ADMELOG) 5 Unit(s) SubCutaneous three times a day before meals  ketotifen 0.025% Ophthalmic Solution 1 Drop(s) Both EYES two times a day  lactobacillus acidophilus 1 Tablet(s) Oral two times a day  levoFLOXacin  Tablet 500 milliGRAM(s) Oral every 24 hours  losartan 50 milliGRAM(s) Oral daily  metoprolol succinate ER 50 milliGRAM(s) Oral daily  mirtazapine 7.5 milliGRAM(s) Oral at bedtime  montelukast 10 milliGRAM(s) Oral daily  nystatin    Suspension 614190 Unit(s) Oral four times a day  pantoprazole    Tablet 40 milliGRAM(s) Oral before breakfast  tiotropium 18 MICROgram(s) Capsule 1 Capsule(s) Inhalation daily  venlafaxine XR. 37.5 milliGRAM(s) Oral daily    MEDICATIONS  (PRN):  acetaminophen     Tablet .. 650 milliGRAM(s) Oral every 6 hours PRN Temp greater or equal to 38C (100.4F), Mild Pain (1 - 3), Moderate Pain (4 - 6)  ALBUTerol    90 MICROgram(s) HFA Inhaler 2 Puff(s) Inhalation every 6 hours PRN Shortness of Breath and/or Wheezing  benzocaine 15 mG/menthol 3.6 mG Lozenge 1 Lozenge Oral every 6 hours PRN Sore Throat  hydrocortisone hemorrhoidal Suppository 1 Suppository(s) Rectal two times a day PRN Rectal pain/hemorrhoids  simethicone 80 milliGRAM(s) Chew every 8 hours PRN Gas    Allergies  No Known Allergies    Intolerances    REVIEW OF SYSTEMS:  Please see interval HPI:    Vital Signs Last 24 Hrs  T(C): 36.5 (30 Jan 2022 06:00), Max: 36.7 (29 Jan 2022 13:52)  T(F): 97.7 (30 Jan 2022 06:00), Max: 98 (29 Jan 2022 13:52)  HR: 66 (30 Jan 2022 08:41) (60 - 94)  BP: 123/63 (30 Jan 2022 06:00) (106/61 - 151/60)  RR: 18 (30 Jan 2022 06:00) (16 - 18)  SpO2: 98% (30 Jan 2022 08:41) (95% - 100%)  I&O's Detail    PHYSICAL EXAM:  GENERAL: NAD, sitting in bed, very thin  HEAD:  NC/AT  EYES: EOMI, clear sclera/conjunctiva  ENMT: MMM, sticks out tongue, thrush appears to improving  NECK: supple  NERVOUS SYSTEM: moving extremities, non-focal   CHEST/LUNG: NC in place, no respiratory distress on supplemental O2, coarse BS  HEART: S1S2 RRR  ABDOMEN: soft, non-tender  EXTREMITIES:  no c/c/e    LABS:                        12.1   8.30  )-----------( 148      ( 30 Jan 2022 07:35 )             40.3     30 Jan 2022 07:35    139    |  99     |  15     ----------------------------<  127    4.1     |  36     |  0.33     Ca    8.8        30 Jan 2022 07:35  Phos  2.9       30 Jan 2022 07:35  Mg     2.00      30 Jan 2022 07:35    CAPILLARY BLOOD GLUCOSE  POCT Blood Glucose.: 145 mg/dL (30 Jan 2022 09:24)  POCT Blood Glucose.: 120 mg/dL (29 Jan 2022 22:41)  POCT Blood Glucose.: 64 mg/dL (29 Jan 2022 22:17)  POCT Blood Glucose.: 63 mg/dL (29 Jan 2022 22:15)  POCT Blood Glucose.: 100 mg/dL (29 Jan 2022 17:33)  POCT Blood Glucose.: 102 mg/dL (29 Jan 2022 13:30)    BLOOD CULTURE    RADIOLOGY & ADDITIONAL TESTS:    Imaging Personally Reviewed:  [ ] YES     Consultant(s) Notes Reviewed:      Care Discussed with Consultants/Other Providers: LION Romero re: episode of hypoglycemia, f/u endocrine recs as may need addition adjustment of insulin regimen (PO intake varying, now off steroids), anticipate d/c home in AM, patient, family in agreement

## 2022-01-30 NOTE — PROGRESS NOTE ADULT - PROBLEM SELECTOR PLAN 1
# Acute on chronic hypoxic/hypercapnic respiratory failure, COVID19 infection, bronchiectasis, potential superimposed pneumonia  - pulm and ID input appreciated  - c/w duonebs, ATC 3% saline, aerobika device  - c/w supplemental O2, AVAPS qhs, monitor respiratory status closely, appears stable  - symptomatic management of cough  - c/w IV meropenem as per ID (while inpatient)  - ID recs appreciated, sputum cx polymicrobial w/ pseudomonas, klebsiella, elizabethkingia, sensitivities noted, elizabethkingia resistant to meropenem, levofloxacin added to regimen, per ID on discharge to continue Levaquin to complete total of 2 weeks  - s/p remdesivir 5 day course  - completed 10 day course of dexamethasone 1/28  - c/w montelukast, Spiriva, Symbicort Flonase  - pulm input appreciated, has f/u appointment 2/2 9AM w/ Dr. Young

## 2022-01-30 NOTE — PROGRESS NOTE ADULT - ASSESSMENT
65 yo F w/ diffuse cystic bronchiectasis and primary ciliary dyskinesia, on chronic home O2 and AVAPS qhs, ABPA, HTN, GERD, DM2 on long term insulin therapy, depression, liver cirrhosis and CBD stricture, recent admission for  pseudomonas PNA, admitted w/ acute on chronic hypoxic/hypercapnic respiratory failure 2/2 COVID19/bronchiectasis flare and concern for superimposed bacterial pneumonia, course c/b episodes of hypoglycemia.

## 2022-01-30 NOTE — PROGRESS NOTE ADULT - PROBLEM SELECTOR PLAN 8
# Loose BM (improving)  - reported  multiple loose bowel movements  - hold bowel regimen, reassess   - patient reports stools becoming more formed, continue to monitor

## 2022-01-31 ENCOUNTER — TRANSCRIPTION ENCOUNTER (OUTPATIENT)
Age: 67
End: 2022-01-31

## 2022-01-31 VITALS — OXYGEN SATURATION: 99 % | HEART RATE: 68 BPM

## 2022-01-31 LAB
ANION GAP SERPL CALC-SCNC: 1 MMOL/L — LOW (ref 7–14)
BUN SERPL-MCNC: 13 MG/DL — SIGNIFICANT CHANGE UP (ref 7–23)
CALCIUM SERPL-MCNC: 8.9 MG/DL — SIGNIFICANT CHANGE UP (ref 8.4–10.5)
CHLORIDE SERPL-SCNC: 96 MMOL/L — LOW (ref 98–107)
CO2 SERPL-SCNC: 40 MMOL/L — HIGH (ref 22–31)
CREAT SERPL-MCNC: 0.37 MG/DL — LOW (ref 0.5–1.3)
GLUCOSE BLDC GLUCOMTR-MCNC: 255 MG/DL — HIGH (ref 70–99)
GLUCOSE SERPL-MCNC: 152 MG/DL — HIGH (ref 70–99)
HCT VFR BLD CALC: 39.7 % — SIGNIFICANT CHANGE UP (ref 34.5–45)
HGB BLD-MCNC: 12.4 G/DL — SIGNIFICANT CHANGE UP (ref 11.5–15.5)
MAGNESIUM SERPL-MCNC: 2 MG/DL — SIGNIFICANT CHANGE UP (ref 1.6–2.6)
MCHC RBC-ENTMCNC: 27.9 PG — SIGNIFICANT CHANGE UP (ref 27–34)
MCHC RBC-ENTMCNC: 31.2 GM/DL — LOW (ref 32–36)
MCV RBC AUTO: 89.2 FL — SIGNIFICANT CHANGE UP (ref 80–100)
NRBC # BLD: 0 /100 WBCS — SIGNIFICANT CHANGE UP
NRBC # FLD: 0 K/UL — SIGNIFICANT CHANGE UP
PHOSPHATE SERPL-MCNC: 2.8 MG/DL — SIGNIFICANT CHANGE UP (ref 2.5–4.5)
PLATELET # BLD AUTO: 145 K/UL — LOW (ref 150–400)
POTASSIUM SERPL-MCNC: 4.1 MMOL/L — SIGNIFICANT CHANGE UP (ref 3.5–5.3)
POTASSIUM SERPL-SCNC: 4.1 MMOL/L — SIGNIFICANT CHANGE UP (ref 3.5–5.3)
RBC # BLD: 4.45 M/UL — SIGNIFICANT CHANGE UP (ref 3.8–5.2)
RBC # FLD: 13.4 % — SIGNIFICANT CHANGE UP (ref 10.3–14.5)
SODIUM SERPL-SCNC: 137 MMOL/L — SIGNIFICANT CHANGE UP (ref 135–145)
WBC # BLD: 8.66 K/UL — SIGNIFICANT CHANGE UP (ref 3.8–10.5)
WBC # FLD AUTO: 8.66 K/UL — SIGNIFICANT CHANGE UP (ref 3.8–10.5)

## 2022-01-31 PROCEDURE — 99232 SBSQ HOSP IP/OBS MODERATE 35: CPT

## 2022-01-31 PROCEDURE — 99233 SBSQ HOSP IP/OBS HIGH 50: CPT | Mod: 25

## 2022-01-31 PROCEDURE — 99239 HOSP IP/OBS DSCHRG MGMT >30: CPT

## 2022-01-31 RX ORDER — INSULIN LISPRO 100/ML
5 VIAL (ML) SUBCUTANEOUS
Refills: 0 | Status: DISCONTINUED | OUTPATIENT
Start: 2022-02-01 | End: 2022-01-31

## 2022-01-31 RX ORDER — FERROUS SULFATE 325(65) MG
325 TABLET ORAL DAILY
Refills: 0 | Status: DISCONTINUED | OUTPATIENT
Start: 2022-01-31 | End: 2022-01-31

## 2022-01-31 RX ORDER — INSULIN LISPRO 100/ML
3 VIAL (ML) SUBCUTANEOUS
Refills: 0 | Status: DISCONTINUED | OUTPATIENT
Start: 2022-01-31 | End: 2022-01-31

## 2022-01-31 RX ORDER — INSULIN GLARGINE 100 [IU]/ML
5 INJECTION, SOLUTION SUBCUTANEOUS
Qty: 1 | Refills: 0
Start: 2022-01-31 | End: 2022-03-01

## 2022-01-31 RX ORDER — GLUCAGON INJECTION, SOLUTION 0.5 MG/.1ML
3 INJECTION, SOLUTION SUBCUTANEOUS
Qty: 2 | Refills: 0
Start: 2022-01-31 | End: 2022-02-01

## 2022-01-31 RX ORDER — INSULIN LISPRO 100/ML
5 VIAL (ML) SUBCUTANEOUS
Refills: 0 | Status: DISCONTINUED | OUTPATIENT
Start: 2022-01-31 | End: 2022-01-31

## 2022-01-31 RX ADMIN — KETOTIFEN FUMARATE 1 DROP(S): 0.34 SOLUTION OPHTHALMIC at 07:38

## 2022-01-31 RX ADMIN — Medication 3: at 13:09

## 2022-01-31 RX ADMIN — Medication 1: at 09:23

## 2022-01-31 RX ADMIN — LOSARTAN POTASSIUM 50 MILLIGRAM(S): 100 TABLET, FILM COATED ORAL at 07:37

## 2022-01-31 RX ADMIN — TIOTROPIUM BROMIDE 1 CAPSULE(S): 18 CAPSULE ORAL; RESPIRATORY (INHALATION) at 09:25

## 2022-01-31 RX ADMIN — BUDESONIDE AND FORMOTEROL FUMARATE DIHYDRATE 2 PUFF(S): 160; 4.5 AEROSOL RESPIRATORY (INHALATION) at 09:25

## 2022-01-31 RX ADMIN — Medication 5 UNIT(S): at 09:24

## 2022-01-31 RX ADMIN — Medication 1 TABLET(S): at 07:36

## 2022-01-31 RX ADMIN — Medication 650 MILLIGRAM(S): at 09:24

## 2022-01-31 RX ADMIN — MONTELUKAST 10 MILLIGRAM(S): 4 TABLET, CHEWABLE ORAL at 11:54

## 2022-01-31 RX ADMIN — Medication 5 UNIT(S): at 13:10

## 2022-01-31 RX ADMIN — Medication 500000 UNIT(S): at 00:49

## 2022-01-31 RX ADMIN — Medication 650 MILLIGRAM(S): at 10:00

## 2022-01-31 RX ADMIN — Medication 50 MILLIGRAM(S): at 07:37

## 2022-01-31 RX ADMIN — Medication 37.5 MILLIGRAM(S): at 11:54

## 2022-01-31 RX ADMIN — Medication 500000 UNIT(S): at 11:54

## 2022-01-31 RX ADMIN — PANTOPRAZOLE SODIUM 40 MILLIGRAM(S): 20 TABLET, DELAYED RELEASE ORAL at 07:37

## 2022-01-31 RX ADMIN — ENOXAPARIN SODIUM 30 MILLIGRAM(S): 100 INJECTION SUBCUTANEOUS at 11:54

## 2022-01-31 RX ADMIN — SIMETHICONE 80 MILLIGRAM(S): 80 TABLET, CHEWABLE ORAL at 07:37

## 2022-01-31 RX ADMIN — Medication 500000 UNIT(S): at 07:35

## 2022-01-31 RX ADMIN — Medication 1 SPRAY(S): at 07:38

## 2022-01-31 NOTE — PROVIDER CONTACT NOTE (HYPOGLYCEMIA EVENT) - NS PROVIDER CONTACT RECOMMEND-HYPO
pt given fruit juice will reassess
notify md. give pt 4oz of apple juice
ACP Jeremi stated to hold insulin until after meal and to notify fingerstick and how much patient eats.
ACP Jeremi stated to hold insulin until after meal. ACP Jeremi stated to notify how much she eats and what the next fingerstick is. 
notify md. give pt 4oz of apple juice

## 2022-01-31 NOTE — PROVIDER CONTACT NOTE (HYPOGLYCEMIA EVENT) - NS PROVIDER CONTACT NOTE-TREATMENT-HYPO
4 oz Fruit Juice (Specify quantity, date/time)

## 2022-01-31 NOTE — PROGRESS NOTE ADULT - PROBLEM SELECTOR PROBLEM 8
Need for prophylactic measure
Need for prophylactic measure
Loose bowel movement
Loose bowel movement

## 2022-01-31 NOTE — PROGRESS NOTE ADULT - SUBJECTIVE AND OBJECTIVE BOX
PULMONARY SERVICE FOLLOW UP CONSULT NOTE    SUBJECTIVE:    REVIEW OF SYSTEMS:  All additional ROS negative.    MEDICATIONS:  Pulmonary:  ALBUTerol    90 MICROgram(s) HFA Inhaler 2 Puff(s) Inhalation every 6 hours PRN  budesonide 160 MICROgram(s)/formoterol 4.5 MICROgram(s) Inhaler 2 Puff(s) Inhalation two times a day  montelukast 10 milliGRAM(s) Oral daily  tiotropium 18 MICROgram(s) Capsule 1 Capsule(s) Inhalation daily    Antimicrobials:  levoFLOXacin  Tablet 500 milliGRAM(s) Oral every 24 hours  nystatin    Suspension 863325 Unit(s) Oral four times a day    Anticoagulants:  enoxaparin Injectable 30 milliGRAM(s) SubCutaneous daily    Onc:    GI/:  famotidine    Tablet 40 milliGRAM(s) Oral at bedtime  pantoprazole    Tablet 40 milliGRAM(s) Oral before breakfast  simethicone 80 milliGRAM(s) Chew every 8 hours PRN    Endocrine:  dextrose 40% Gel 15 Gram(s) Oral once  dextrose 50% Injectable 25 Gram(s) IV Push once  dextrose 50% Injectable 12.5 Gram(s) IV Push once  dextrose 50% Injectable 25 Gram(s) IV Push once  glucagon  Injectable 1 milliGRAM(s) IntraMuscular once  insulin glargine Injectable (LANTUS) 5 Unit(s) SubCutaneous at bedtime  insulin lispro (ADMELOG) corrective regimen sliding scale   SubCutaneous three times a day before meals  insulin lispro (ADMELOG) corrective regimen sliding scale   SubCutaneous at bedtime  insulin lispro Injectable (ADMELOG) 5 Unit(s) SubCutaneous before lunch  insulin lispro Injectable (ADMELOG) 3 Unit(s) SubCutaneous before dinner    Cardiac:  losartan 50 milliGRAM(s) Oral daily  metoprolol succinate ER 50 milliGRAM(s) Oral daily    Other Medications:  acetaminophen     Tablet .. 650 milliGRAM(s) Oral every 6 hours PRN  benzocaine 15 mG/menthol 3.6 mG Lozenge 1 Lozenge Oral every 6 hours PRN  calcium carbonate 1250 mG  + Vitamin D (OsCal 500 + D) 1 Tablet(s) Oral two times a day  dextrose 5%. 1000 milliLiter(s) IV Continuous <Continuous>  dextrose 5%. 1000 milliLiter(s) IV Continuous <Continuous>  ferrous    sulfate 325 milliGRAM(s) Oral daily  fluticasone propionate 50 MICROgram(s)/spray Nasal Spray 1 Spray(s) Both Nostrils two times a day  hydrocortisone hemorrhoidal Suppository 1 Suppository(s) Rectal two times a day PRN  ketotifen 0.025% Ophthalmic Solution 1 Drop(s) Both EYES two times a day  lactobacillus acidophilus 1 Tablet(s) Oral two times a day  mirtazapine 7.5 milliGRAM(s) Oral at bedtime  venlafaxine XR. 37.5 milliGRAM(s) Oral daily      PHYSICAL EXAM  Vital Signs Last 24 Hrs  T(C): 37 (31 Jan 2022 14:47), Max: 37 (31 Jan 2022 14:47)  T(F): 98.6 (31 Jan 2022 14:47), Max: 98.6 (31 Jan 2022 14:47)  HR: 72 (31 Jan 2022 14:47) (61 - 80)  BP: 117/62 (31 Jan 2022 14:47) (117/62 - 132/62)  BP(mean): --  RR: 18 (31 Jan 2022 14:47) (18 - 18)  SpO2: 99% (31 Jan 2022 14:47) (95% - 99%)    Mode: standby      CONSTITUTIONAL: No acute distress.   HEENT:  Conjunctiva clear B/L.  Moist oral mucosa.   Cardiovascular: RRR with no murmurs. No JVD noted. No lower extremity edema B/L. Extremities are warm and well perfused. Radial pulses 2+ B/L. Dorsalis pedis pulses 2+ B/L.    Respiratory: Lungs CTAB. No wrr. No accessory muscle use.   Gastrointestinal:  Soft, nontender. Non-distended. Non-rigid. No CVA tenderness B/L.  MSK:  No joint swelling. No joint erythema B/L. No midline spinal tenderness.  Neurologic:  Alert and awake. Oriented x3. Moving all extremities. Following commands. Making eye contact.    Skin:  No rashes noted. No skin erythema noted.   Psych:  Normal affect. Normal Mood.     LABS:      CBC Full  -  ( 31 Jan 2022 07:38 )  WBC Count : 8.66 K/uL  RBC Count : 4.45 M/uL  Hemoglobin : 12.4 g/dL  Hematocrit : 39.7 %  Platelet Count - Automated : 145 K/uL  Mean Cell Volume : 89.2 fL  Mean Cell Hemoglobin : 27.9 pg  Mean Cell Hemoglobin Concentration : 31.2 gm/dL  Auto Neutrophil # : x  Auto Lymphocyte # : x  Auto Monocyte # : x  Auto Eosinophil # : x  Auto Basophil # : x  Auto Neutrophil % : x  Auto Lymphocyte % : x  Auto Monocyte % : x  Auto Eosinophil % : x  Auto Basophil % : x    01-31    137  |  96<L>  |  13  ----------------------------<  152<H>  4.1   |  40<H>  |  0.37<L>    Ca    8.9      31 Jan 2022 07:38  Phos  2.8     01-31  Mg     2.00     01-31                        RADIOLOGY & ADDITIONAL STUDIES: PULMONARY SERVICE FOLLOW UP CONSULT NOTE    SUBJECTIVE:  Family utilized as  per patient preference.  Reports improvement in cough.  Denies dyspnea at rest.    REVIEW OF SYSTEMS:  All additional ROS negative.    MEDICATIONS:  Pulmonary:  ALBUTerol    90 MICROgram(s) HFA Inhaler 2 Puff(s) Inhalation every 6 hours PRN  budesonide 160 MICROgram(s)/formoterol 4.5 MICROgram(s) Inhaler 2 Puff(s) Inhalation two times a day  montelukast 10 milliGRAM(s) Oral daily  tiotropium 18 MICROgram(s) Capsule 1 Capsule(s) Inhalation daily    Antimicrobials:  levoFLOXacin  Tablet 500 milliGRAM(s) Oral every 24 hours  nystatin    Suspension 154298 Unit(s) Oral four times a day    Anticoagulants:  enoxaparin Injectable 30 milliGRAM(s) SubCutaneous daily    Onc:    GI/:  famotidine    Tablet 40 milliGRAM(s) Oral at bedtime  pantoprazole    Tablet 40 milliGRAM(s) Oral before breakfast  simethicone 80 milliGRAM(s) Chew every 8 hours PRN    Endocrine:  dextrose 40% Gel 15 Gram(s) Oral once  dextrose 50% Injectable 25 Gram(s) IV Push once  dextrose 50% Injectable 12.5 Gram(s) IV Push once  dextrose 50% Injectable 25 Gram(s) IV Push once  glucagon  Injectable 1 milliGRAM(s) IntraMuscular once  insulin glargine Injectable (LANTUS) 5 Unit(s) SubCutaneous at bedtime  insulin lispro (ADMELOG) corrective regimen sliding scale   SubCutaneous three times a day before meals  insulin lispro (ADMELOG) corrective regimen sliding scale   SubCutaneous at bedtime  insulin lispro Injectable (ADMELOG) 5 Unit(s) SubCutaneous before lunch  insulin lispro Injectable (ADMELOG) 3 Unit(s) SubCutaneous before dinner    Cardiac:  losartan 50 milliGRAM(s) Oral daily  metoprolol succinate ER 50 milliGRAM(s) Oral daily    Other Medications:  acetaminophen     Tablet .. 650 milliGRAM(s) Oral every 6 hours PRN  benzocaine 15 mG/menthol 3.6 mG Lozenge 1 Lozenge Oral every 6 hours PRN  calcium carbonate 1250 mG  + Vitamin D (OsCal 500 + D) 1 Tablet(s) Oral two times a day  dextrose 5%. 1000 milliLiter(s) IV Continuous <Continuous>  dextrose 5%. 1000 milliLiter(s) IV Continuous <Continuous>  ferrous    sulfate 325 milliGRAM(s) Oral daily  fluticasone propionate 50 MICROgram(s)/spray Nasal Spray 1 Spray(s) Both Nostrils two times a day  hydrocortisone hemorrhoidal Suppository 1 Suppository(s) Rectal two times a day PRN  ketotifen 0.025% Ophthalmic Solution 1 Drop(s) Both EYES two times a day  lactobacillus acidophilus 1 Tablet(s) Oral two times a day  mirtazapine 7.5 milliGRAM(s) Oral at bedtime  venlafaxine XR. 37.5 milliGRAM(s) Oral daily      PHYSICAL EXAM  Vital Signs Last 24 Hrs  T(C): 37 (31 Jan 2022 14:47), Max: 37 (31 Jan 2022 14:47)  T(F): 98.6 (31 Jan 2022 14:47), Max: 98.6 (31 Jan 2022 14:47)  HR: 72 (31 Jan 2022 14:47) (61 - 80)  BP: 117/62 (31 Jan 2022 14:47) (117/62 - 132/62)  BP(mean): --  RR: 18 (31 Jan 2022 14:47) (18 - 18)  SpO2: 99% (31 Jan 2022 14:47) (95% - 99%)    Mode: standby      CONSTITUTIONAL: No acute distress.   HEENT:  Conjunctiva clear B/L.  Moist oral mucosa.   Cardiovascular: RRR with no murmurs. No JVD noted. No lower extremity edema B/L. Extremities are warm and well perfused.   Respiratory: Trace rhonchi b/l. No accessory muscle use.   Gastrointestinal:  Soft, nontender. Non-distended. Non-rigid.   MSK:  No joint swelling. No joint erythema B/L. No midline spinal tenderness.  Neurologic:  Alert and awake. Moving all extremities. Following commands. Making eye contact.    Skin:  No rashes noted. No skin erythema noted.        LABS:      CBC Full  -  ( 31 Jan 2022 07:38 )  WBC Count : 8.66 K/uL  RBC Count : 4.45 M/uL  Hemoglobin : 12.4 g/dL  Hematocrit : 39.7 %  Platelet Count - Automated : 145 K/uL  Mean Cell Volume : 89.2 fL  Mean Cell Hemoglobin : 27.9 pg  Mean Cell Hemoglobin Concentration : 31.2 gm/dL  Auto Neutrophil # : x  Auto Lymphocyte # : x  Auto Monocyte # : x  Auto Eosinophil # : x  Auto Basophil # : x  Auto Neutrophil % : x  Auto Lymphocyte % : x  Auto Monocyte % : x  Auto Eosinophil % : x  Auto Basophil % : x    01-31    137  |  96<L>  |  13  ----------------------------<  152<H>  4.1   |  40<H>  |  0.37<L>    Ca    8.9      31 Jan 2022 07:38  Phos  2.8     01-31  Mg     2.00     01-31                        RADIOLOGY & ADDITIONAL STUDIES:

## 2022-01-31 NOTE — PROVIDER CONTACT NOTE (HYPOGLYCEMIA EVENT) - NS PROVIDER CONTACT REASON - HYPO
pt blood sugar 63; repeat sugar 64
Hypoglycemia
Hypoglycemia
pt blood sugar 64; repeat sugar 63
Pt went hypoglycemic

## 2022-01-31 NOTE — PROGRESS NOTE ADULT - PROBLEM SELECTOR PROBLEM 7
Severe protein-calorie malnutrition
Other depression
Other depression
Severe protein-calorie malnutrition

## 2022-01-31 NOTE — PROGRESS NOTE ADULT - ASSESSMENT
Patient is a a 67 yo F w/ Bronchiectasis, chronic hypercapnic/hypoxemic respiratory failure (2 to 5L O2/IVAPS - RR 18, EPAP 5, PS 15-25, average TV is 443), Liver Cirrohsis with prior CBD stricture s/p dilation and HCV ab+ who presents with fevers, productive sputum and wheezing, found to be COVID positive.     #Acute on Chronic hypoxemic/hypercapnic respiratory failure  #Acute bronchiectasis flare 2/2 bacterial process superimposed by COVID infection  - CXR 1/21 showed diffuse bilateral coarse reticular markings grossly unchanged from prior CXR  - Duonebs and ATC/3% Saline q6h  - aerobika prn for airway clearance  - Decadron 6mg PO daily x7 days as patient already took Pred 40mg x 3 days to end 1/28  - completed course of remdesivir  - appreciate ID recs  - continue meropenem while inpatient  - continue levaquin for 2 week course (can be completed outpatient)  - supplemental O2 to maintain SpO2 > 88%, remains on home 2L NC  - NIPPV QHS: AVAPS RR 18, EPAP 5, Min IP 15, Max IP 25,   - C/w Mirtazapine and Venlafaxine for anxiety    #Oropharyngeal candidiasis  - start nystatin swish and swallow qid  - magic mouthwash/benzocaine spray or lozenges for sore throat    Jose De Jesus Mead PGY-6  Pulmonary/Critical Care Fellow  Pager: [phone] ([facility]) [phone] ([facility])  Pulmonary Spectra #[phone] ([facility]) / [phone] ([facility])     Patient is a a 67 yo F w/ Bronchiectasis, chronic hypercapnic/hypoxemic respiratory failure (2 to 5L O2/IVAPS - RR 18, EPAP 5, PS 15-25, average TV is 443), Liver Cirrohsis with prior CBD stricture s/p dilation and HCV ab+ who presents with fevers, productive sputum and wheezing, found to be COVID positive.     #Acute on Chronic hypoxemic/hypercapnic respiratory failure  #Acute bronchiectasis flare 2/2 bacterial process superimposed by COVID infection  - CXR 1/21 showed diffuse bilateral coarse reticular markings grossly unchanged from prior CXR  - Duonebs and ATC/3% Saline q6h  - aerobika prn for airway clearance  - Decadron 6mg PO daily x7 days as patient already took Pred 40mg x 3 days to end 1/28  - completed course of remdesivir  - appreciate ID recs  - continue levaquin for 2 week course (can be completed outpatient)  - supplemental O2 to maintain SpO2 > 88%, remains on home 2L NC  - NIPPV QHS: AVAPS RR 18, EPAP 5, Min IP 15, Max IP 25,   - C/w Mirtazapine and Venlafaxine for anxiety    *Note is not complete unless signed by the attending    Dr. Carson Artis, DO  Pulmonary and Critical Care Fellow   Available via [employer] Teams - *preferred*  Pager:  [phone]   Patient is a a 67 yo F w/ Bronchiectasis, chronic hypercapnic/hypoxemic respiratory failure (2 to 5L O2/IVAPS - RR 18, EPAP 5, PS 15-25, average TV is 443), Liver Cirrohsis with prior CBD stricture s/p dilation and HCV ab+ who presents with fevers, productive sputum and wheezing, found to be COVID positive.     #Acute on Chronic hypoxemic/hypercapnic respiratory failure  #Acute bronchiectasis flare 2/2 bacterial process superimposed by COVID infection  - CXR 1/21 showed diffuse bilateral coarse reticular markings grossly unchanged from prior CXR  - Duonebs and ATC/3% Saline q6h  - aerobika prn for airway clearance  - s/p 10 days of decadron and 5 days of remdesivir.  - appreciate ID recs  - continue levaquin for 2 week course (can be completed outpatient)  - supplemental O2 to maintain SpO2 > 88%, remains on home 2L NC  - NIPPV QHS: AVAPS RR 18, EPAP 5, Min IP 15, Max IP 25,   - Patient with tele visit on 2/1.     *Note is not complete unless signed by the attending    Dr. Carson Artis, DO  Pulmonary and Critical Care Fellow   Available via [employer] Teams - *preferred*  Pager:  [phone]

## 2022-01-31 NOTE — DISCHARGE NOTE NURSING/CASE MANAGEMENT/SOCIAL WORK - NSDCFUADDAPPT_GEN_ALL_CORE_FT
Follow up with Pulmonologist Dr Young via telehealth on 2/2/2022;  Continue supplemental O2 to maintain SpO2 > 88%, remains on home 2L NC., Continue AVAPS at night     For endocrine followup, can schedule at [facility] [place]: [address] [address], [phone]    Follow up with your primary care doctor in one week.  Follow up with an opthomalogist in one month.

## 2022-01-31 NOTE — PROGRESS NOTE ADULT - PROVIDER SPECIALTY LIST ADULT
Hospitalist
Pulmonology
Hospitalist
Hospitalist
Pulmonology
Pulmonology
Hospitalist
Infectious Disease
Pulmonology
Critical Care
Infectious Disease
Pulmonology
Pulmonology
Endocrinology
Endocrinology
Hospitalist
Endocrinology
Endocrinology
Hospitalist
Hospitalist

## 2022-01-31 NOTE — PROGRESS NOTE ADULT - PROBLEM SELECTOR PROBLEM 4
HLD (hyperlipidemia)
Type 2 diabetes mellitus, with long-term current use of insulin
Hypertension
Hypertension
Type 2 diabetes mellitus, with long-term current use of insulin

## 2022-01-31 NOTE — PROGRESS NOTE ADULT - NUTRITIONAL ASSESSMENT
This patient has been assessed with a concern for Malnutrition and has been determined to have a diagnosis/diagnoses of Severe protein-calorie malnutrition and Underweight (BMI < 19).    This patient is being managed with:   Diet Minced and Moist-  Consistent Carbohydrate {No Snacks} (CSTCHO)  DASH/TLC {Sodium & Cholesterol Restricted} (DASH)  No Beef  Supplement Feeding Modality:  Oral  Glucerna Shake Cans or Servings Per Day:  1       Frequency:  Two Times a day  Entered: Jan 28 2022  5:20PM    
This patient has been assessed with a concern for Malnutrition and has been determined to have a diagnosis/diagnoses of Severe protein-calorie malnutrition and Underweight (BMI < 19).    This patient is being managed with:   Diet Pureed-  Consistent Carbohydrate {No Snacks} (CSTCHO)  DASH/TLC {Sodium & Cholesterol Restricted} (DASH)  Supplement Feeding Modality:  Oral  Glucerna Shake Cans or Servings Per Day:  1       Frequency:  Two Times a day  Entered: Jan 27 2022  7:55PM    
This patient has been assessed with a concern for Malnutrition and has been determined to have a diagnosis/diagnoses of Underweight (BMI < 19) and Severe protein-calorie malnutrition.    This patient is being managed with:   Diet Minced and Moist-  Consistent Carbohydrate {No Snacks} (CSTCHO)  DASH/TLC {Sodium & Cholesterol Restricted} (DASH)  No Beef  Supplement Feeding Modality:  Oral  Glucerna Shake Cans or Servings Per Day:  1       Frequency:  Two Times a day  Entered: Jan 28 2022  5:20PM    
This patient has been assessed with a concern for Malnutrition and has been determined to have a diagnosis/diagnoses of Severe protein-calorie malnutrition and Underweight (BMI < 19).    This patient is being managed with:   Diet Minced and Moist-  Consistent Carbohydrate {No Snacks} (CSTCHO)  DASH/TLC {Sodium & Cholesterol Restricted} (DASH)  No Beef  Supplement Feeding Modality:  Oral  Glucerna Shake Cans or Servings Per Day:  1       Frequency:  Two Times a day  Entered: Jan 28 2022  5:20PM

## 2022-01-31 NOTE — PROVIDER CONTACT NOTE (HYPOGLYCEMIA EVENT) - NS PROVIDER CONTACT SITUATION-HYPO
Pt went hypoglycemic with blood sugars in the 50's 
pt a&ox4, vitally stable. pt noted to be hypoglycemic to 63. pt with no s/s of distress
pt a&ox4, vitally stable. pt noted to be hypoglycemic to 64. pt with no s/s of distress
Patient fingerstick 54 repeat 59. Patient given applejuice and reassessed fingerstick 15 mins later 148. 
Patient fingerstick 54, reassessed 59. Apple juice given then reassessed 15 mins later and 148. Patient asymptomatic.

## 2022-01-31 NOTE — PHARMACOTHERAPY INTERVENTION NOTE - COMMENTS
Per [facility], Baqsimi (intranasal glucagon was covered by patient's insurance) and Freestyle Tessie 2 needed a prior authorization (prior authorization approved PA#[id number] (sensors) and [id number] (reader) through 1/31/23) - however, when [facility] tried to reprocess the Tessie prescriptions, it came back rejected from pt's managed medicaid. Faxed prescriptions to [facility] ([phone]).

## 2022-01-31 NOTE — PROGRESS NOTE ADULT - SUBJECTIVE AND OBJECTIVE BOX
DIABETES FOLLOW UP : Seen earlier today    INTERVAL HX:  pt w/ hypoglycemia to 63 at bedtime, endorses weakness when hypoglycemic, resolved with apple juice . noted hypoglycemic at bedtime past 48 h, son Alejandra interpreting on pt phone per pt request. pt endorses eating 100% of meal  denies snacks overnight or inbetween meals. get symbicort around same time as lunch BG check with lunch BG values variable 100s-200s. endorses stomach ache on and off noted pt has GERD. also endorsing dry throat, pt w/ thrush.     Review of Systems:  General: As above  Cardiovascular: No chest pain  Respiratory: No SOB  GI: No nausea, vomiting  Endocrine: weakness w/ BG 63    Allergies    No Known Allergies    Intolerances      MEDICATIONS  (STANDING):  budesonide 160 MICROgram(s)/formoterol 4.5 MICROgram(s) Inhaler 2 Puff(s) Inhalation two times a day  calcium carbonate 1250 mG  + Vitamin D (OsCal 500 + D) 1 Tablet(s) Oral two times a day  dextrose 40% Gel 15 Gram(s) Oral once  dextrose 5%. 1000 milliLiter(s) (50 mL/Hr) IV Continuous <Continuous>  dextrose 5%. 1000 milliLiter(s) (100 mL/Hr) IV Continuous <Continuous>  dextrose 50% Injectable 25 Gram(s) IV Push once  dextrose 50% Injectable 12.5 Gram(s) IV Push once  dextrose 50% Injectable 25 Gram(s) IV Push once  enoxaparin Injectable 30 milliGRAM(s) SubCutaneous daily  famotidine    Tablet 40 milliGRAM(s) Oral at bedtime  ferrous    sulfate 325 milliGRAM(s) Oral daily  fluticasone propionate 50 MICROgram(s)/spray Nasal Spray 1 Spray(s) Both Nostrils two times a day  glucagon  Injectable 1 milliGRAM(s) IntraMuscular once  insulin glargine Injectable (LANTUS) 5 Unit(s) SubCutaneous at bedtime  insulin lispro (ADMELOG) corrective regimen sliding scale   SubCutaneous three times a day before meals  insulin lispro (ADMELOG) corrective regimen sliding scale   SubCutaneous at bedtime  insulin lispro Injectable (ADMELOG) 5 Unit(s) SubCutaneous before lunch  insulin lispro Injectable (ADMELOG) 3 Unit(s) SubCutaneous before dinner  ketotifen 0.025% Ophthalmic Solution 1 Drop(s) Both EYES two times a day  lactobacillus acidophilus 1 Tablet(s) Oral two times a day  levoFLOXacin  Tablet 500 milliGRAM(s) Oral every 24 hours  losartan 50 milliGRAM(s) Oral daily  metoprolol succinate ER 50 milliGRAM(s) Oral daily  mirtazapine 7.5 milliGRAM(s) Oral at bedtime  montelukast 10 milliGRAM(s) Oral daily  nystatin    Suspension 852949 Unit(s) Oral four times a day  pantoprazole    Tablet 40 milliGRAM(s) Oral before breakfast  tiotropium 18 MICROgram(s) Capsule 1 Capsule(s) Inhalation daily  venlafaxine XR. 37.5 milliGRAM(s) Oral daily      PHYSICAL EXAM:  VITALS: T(C): 36.8 (01-31-22 @ 06:45)  T(F): 98.2 (01-31-22 @ 06:45), Max: 98.2 (01-30-22 @ 21:38)  HR: 61 (01-31-22 @ 07:35) (61 - 80)  BP: 122/68 (01-31-22 @ 06:45) (122/68 - 132/62)  RR:  (18 - 18)  SpO2:  (95% - 99%)  Wt(kg): --  GENERAL: female laying in bed  in NAD  Abdomen: Soft, nontender, non distended  Extremities: Warm, no edema in all 4 exts  NEURO: A&O X3    LABS:  POCT Blood Glucose.: 195 mg/dL (01-31-22 @ 08:49)  POCT Blood Glucose.: 165 mg/dL (01-30-22 @ 22:40)  POCT Blood Glucose.: 63 mg/dL (01-30-22 @ 22:08)  POCT Blood Glucose.: 64 mg/dL (01-30-22 @ 22:07)  POCT Blood Glucose.: 353 mg/dL (01-30-22 @ 17:41)  POCT Blood Glucose.: 251 mg/dL (01-30-22 @ 12:38)  POCT Blood Glucose.: 145 mg/dL (01-30-22 @ 09:24)  POCT Blood Glucose.: 120 mg/dL (01-29-22 @ 22:41)  POCT Blood Glucose.: 64 mg/dL (01-29-22 @ 22:17)  POCT Blood Glucose.: 63 mg/dL (01-29-22 @ 22:15)  POCT Blood Glucose.: 100 mg/dL (01-29-22 @ 17:33)  POCT Blood Glucose.: 102 mg/dL (01-29-22 @ 13:30)  POCT Blood Glucose.: 127 mg/dL (01-29-22 @ 09:36)  POCT Blood Glucose.: 211 mg/dL (01-28-22 @ 22:06)  POCT Blood Glucose.: 200 mg/dL (01-28-22 @ 17:31)  POCT Blood Glucose.: 204 mg/dL (01-28-22 @ 12:44)                            12.4   8.66  )-----------( 145      ( 31 Jan 2022 07:38 )             39.7       01-31    137  |  96<L>  |  13  ----------------------------<  152<H>  4.1   |  40<H>  |  0.37<L>    EGFR if : 131  EGFR if non : 113    Ca    8.9      01-31  Mg     2.00     01-31  Phos  2.8     01-31 01-22 Chol 128 Direct LDL -- LDL calculated 61 HDL 57 Trig 50    Thyroid Function Tests:  01-21 @ 14:31 TSH 4.32 FreeT4 -- T3 -- Anti TPO -- Anti Thyroglobulin Ab -- TSI --          A1C with Estimated Average Glucose Result: 9.4 % (01-21-22 @ 14:26)      Estimated Average Glucose: 223 (01-21-22 @ 14:26)

## 2022-01-31 NOTE — DISCHARGE NOTE NURSING/CASE MANAGEMENT/SOCIAL WORK - NSDCPEFALRISK_GEN_ALL_CORE
For information on Fall & Injury Prevention, visit: [email or web address] OR  [email or web address] OR  https://www.cdc.gov/steadi/patient.html

## 2022-01-31 NOTE — PROGRESS NOTE ADULT - PROBLEM SELECTOR PROBLEM 2
Steroid-induced hyperglycemia
Steroid-induced hyperglycemia
Type 2 diabetes mellitus, with long-term current use of insulin
Steroid-induced hyperglycemia
Steroid-induced hyperglycemia
Type 2 diabetes mellitus, with long-term current use of insulin
Bacterial pneumonia
Bacterial pneumonia

## 2022-01-31 NOTE — PHARMACOTHERAPY INTERVENTION NOTE - COMMENTS
Contacted NP regarding drug interaction with levofloxacin and iron administration at the same time. NP changed timing of the ferrous sulfate.

## 2022-01-31 NOTE — PROGRESS NOTE ADULT - PROBLEM SELECTOR PLAN 5
- cont PPI and H2 blocker
# GERD  - c/w protonix, pepcid
- cont PPI and H2 blocker
# GERD  - c/w protonix, pepcid

## 2022-01-31 NOTE — PROGRESS NOTE ADULT - ASSESSMENT
67 y/o Leandro speaking Female, with a PmHx of Diffuse Cystic Bronchiectasis and Primary Ciliary Dyskinesia on 2L NC QD and AVAPS QHS, Chronic Sinusitis, ABPA, HTN, GERD, IDDM2, Depression, Nodular Liver and CBD stricture s/p PTC  and admissions for PNA in past, presented to the [facility] ED for SOB  pt is unvaccinated currently being treated for COVID. Endocrine called  for DM assistance, currently on Dexamethasone and Remdesvir     1. DM 2  HbA1c 9%  Home Regimen: Lantus 14 units qHS, Novolog 5-8 units TID (uses correctional scale per son)  Given her body habitus/low BMI, recommend to sent DM 1/PIPPA antibodies and cpeptide  While inpatient, s/p  Dexamethasone 6 mg for COVID last dose 1//28    While inpatient:   BG target 100-180 mg/dl  Continue Lantus 5 units SQ qHS, FBG reasonable on this dose   Adjust Admelog 5-5-3 units SQ TID before meals (Hold if NPO/not eating meal) ; persistent bedtime hypoglycemia past 48 hours lowered dinner admelog   Continue Admelog low dose scale before meals, low dose at bedtime  Check BG before meals and bedtime  Consistent carbohydrate diet  RD consult completed   Hypoglycemia protocol   DM Education: Successful insulin PEN return demonstration done with [facility] pharmacist 1/27, patient's family also assists her with injections at home    Discharge Plan:   Resume basal/bolus insulin PENS (Lantus and Novolog), dose TBD  Please send Freestyle Tessie 2 CGM to assess insurance coverage - Rx sent by endo- DM Pharmacist Assisting  Baqsimi Rx sent by endo - DM Pharmacist Assisting   Ensure she has glucometer, glucose test strips, lancets, alcohol swabs and insulin PEN needles  Followup with PCP, optho, podiatry   For endocrine followup, can schedule at [facility]: [address] [facility] [address], [phone]    2. Steroid induced hyperglycemia  LAST DAY Dexamethasone 6 mg daily 1/28, please notify endocrine with any changes in steroid plan     3. HTN  BP management as per primary team in the setting of COVID    4. HLD  LDL 61  Consider statin given DM as CV risk factor if no contraindications     Discussed with patient & RN  Contact via Microsoft Teams  On evenings and weekends, please call [phone] or page endocrine fellow on call.   Please note that this patient may be followed by different provider tomorrow. If no answer, contact endocrine fellow on call [phone] M-F 9a-5pm    Time spent on encounter: 28  minutes spent on total encounter; The necessity of the time spent during the encounter on this date of service was due to development of plan of care/coordination of care/glycemic control through review of labs, blood glucose values and vital signs.  67 y/o Leandro speaking Female, with a PmHx of Diffuse Cystic Bronchiectasis and Primary Ciliary Dyskinesia on 2L NC QD and AVAPS QHS, Chronic Sinusitis, ABPA, HTN, GERD, IDDM2, Depression, Nodular Liver and CBD stricture s/p PTC  and admissions for PNA in past, presented to the [facility] ED for SOB  pt is unvaccinated currently being treated for COVID. Endocrine called  for DM assistance, currently on Dexamethasone and Remdesvir     1. DM 2  HbA1c 9%  Home Regimen: Lantus 14 units qHS, Novolog 5-8 units TID (uses correctional scale per son)  Given her body habitus/low BMI, recommend to sent DM 1/PIPPA antibodies and cpeptide  While inpatient, s/p  Dexamethasone 6 mg for COVID last dose 1//28    While inpatient:   BG target 100-180 mg/dl  Continue Lantus 5 units SQ qHS, FBG reasonable on this dose   Adjust Admelog 5-5-3 units SQ TID before meals (Hold if NPO/not eating meal) ; persistent bedtime hypoglycemia past 48 hours lowered dinner admelog   Continue Admelog low dose scale before meals, low dose at bedtime  Check BG before meals and bedtime  Consistent carbohydrate diet  RD consult completed   Hypoglycemia protocol   DM Education: Successful insulin PEN return demonstration done with [facility] pharmacist 1/27, patient's family also assists her with injections at home    Discharge Plan:   Resume basal/bolus insulin PENS (Lantus and Novolog), dose TBD  Please send Freestyle Tessie 2 CGM to assess insurance coverage - Rx sent by endo- DM Pharmacist Assisting  Baqsimi Rx sent by endo - DM Pharmacist Assisting   Ensure she has glucometer, glucose test strips, lancets, alcohol swabs and insulin PEN needles  Followup with PCP, optho, podiatry   For endocrine [place]up, [place] [facility] [address] [facility] [address], [phone] APPOINTMENT MADE: endo appointment for 5/24 at 9:20am ADD TO DISCHARGE PAPERWORK     2. Steroid induced hyperglycemia  LAST DAY Dexamethasone 6 mg daily 1/28, please notify endocrine with any changes in steroid plan     3. HTN  BP management as per primary team in the setting of COVID    4. HLD  LDL 61  Consider statin given DM as CV risk factor if no contraindications     Discussed with patient & RN  Contact via Microsoft Teams  On evenings and weekends, please call [phone] or page endocrine fellow on call.   Please note that this patient may be followed by different provider tomorrow. If no answer, contact endocrine fellow on call [phone] M-F 9a-5pm    Time spent on encounter: 28  minutes spent on total encounter; The necessity of the time spent during the encounter on this date of service was due to development of plan of care/coordination of care/glycemic control through review of labs, blood glucose values and vital signs.  67 y/o Leandro speaking Female, with a PmHx of Diffuse Cystic Bronchiectasis and Primary Ciliary Dyskinesia on 2L NC QD and AVAPS QHS, Chronic Sinusitis, ABPA, HTN, GERD, IDDM2, Depression, Nodular Liver and CBD stricture s/p PTC  and admissions for PNA in past, presented to the [facility] ED for SOB  pt is unvaccinated currently being treated for COVID. Endocrine called  for DM assistance, currently on Dexamethasone and Remdesvir     1. DM 2  HbA1c 9%  Home Regimen: Lantus 14 units qHS, Novolog 5-8 units TID (uses correctional scale per son)  Given her body habitus/low BMI, recommend to sent DM 1/PIPPA antibodies and cpeptide  While inpatient, s/p  Dexamethasone 6 mg for COVID last dose 1//28    While inpatient:   BG target 100-180 mg/dl  Continue Lantus 5 units SQ qHS, FBG reasonable on this dose   Adjust Admelog 5-5-3 units SQ TID before meals (Hold if NPO/not eating meal) ; persistent bedtime hypoglycemia past 48 hours lowered dinner admelog   Continue Admelog low dose scale before meals, low dose at bedtime  Check BG before meals and bedtime  Consistent carbohydrate diet  RD consult completed   Hypoglycemia protocol   DM Education: Successful insulin PEN return demonstration done with [facility] pharmacist 1/27, patient's family also assists her with injections at home    Discharge Plan:   Lantus 5 units sq qhs - if Fasting BG >150 for three days in a row can increase dosage by two units every three days (example increase from 5 units to 7 units and so on)   Humalog 5-8 units scale home regimen   Freestyle Tessie 2 CGM- Rx sent by endo- DM Pharmacist Assisting  Baqsimi Rx sent by endo - DM Pharmacist Assisting   Ensure she has glucometer, glucose test strips, lancets, alcohol swabs and insulin PEN needles  Followup with PCP, optho, podiatry   For endocrine followup, can schedule at [facility] [place]: [address] [facility] [address], [phone] APPOINTMENT MADE: endo appointment for 5/24 at 9:20am ADD TO DISCHARGE PAPERWORK     2. Steroid induced hyperglycemia  LAST DAY Dexamethasone 6 mg daily 1/28, please notify endocrine with any changes in steroid plan     3. HTN  BP management as per primary team in the setting of COVID    4. HLD  LDL 61  Consider statin given DM as CV risk factor if no contraindications     Discussed with patient & RN & medical attg   Contact via Microsoft Teams  On evenings and weekends, please call [phone] or page endocrine fellow on call.   Please note that this patient may be followed by different provider tomorrow. If no answer, contact endocrine fellow on call [phone] M-F 9a-5pm    Time spent on encounter: 28  minutes spent on total encounter; The necessity of the time spent during the encounter on this date of service was due to development of plan of care/coordination of care/glycemic control through review of labs, blood glucose values and vital signs.

## 2022-01-31 NOTE — PROVIDER CONTACT NOTE (HYPOGLYCEMIA EVENT) - NS PROVIDER CONTACT ASSESS-HYPO
pt with no s/s of distress. vitally stable.
Patient fingerstick 54, reassessed 59. Apple juice given then reassessed 15 mins later and 148. Patient asymptomatic.
Pt asymptomatic 
pt with no s/s of distress. vitally stable.
Patient asymptomatic

## 2022-01-31 NOTE — PROGRESS NOTE ADULT - PROBLEM SELECTOR PLAN 3
- monitor bp, cont meds, adjust as needed
# oropharyngeal thrush  - nystatin suspension as per pulm, appears to be improving, c/w symptomatic management of mouth discomfort
# oropharyngeal thrush  - nystatin suspension as per pulm, appears to be improving, c/w symptomatic management of mouth discomfort
- monitor bp, cont meds, adjust as needed

## 2022-01-31 NOTE — PROGRESS NOTE ADULT - PROBLEM SELECTOR PROBLEM 1
Acute on chronic respiratory failure with hypoxia and hypercapnia
Type 2 diabetes mellitus, with long-term current use of insulin
2019 novel coronavirus disease (COVID-19)
Type 2 diabetes mellitus, with long-term current use of insulin
Acute on chronic respiratory failure with hypoxia and hypercapnia
2019 novel coronavirus disease (COVID-19)

## 2022-01-31 NOTE — PROGRESS NOTE ADULT - PROBLEM SELECTOR PLAN 6
- cont monteleukast, sympbicort, spiriva, flonase  - House Pulm following
- cont monteleukast, sympbicort, spiriva, flonase  - House Pulm following
# Depression  c/w remeron, resumed home effexor (meds from other sources indicate was prescribed effexor 37.5 mg ER, last filled in December)
# Depression  c/w remeron, resumed home effexor (meds from other sources indicate was prescribed effexor 37.5 mg ER, last filled in December)

## 2022-01-31 NOTE — PROVIDER CONTACT NOTE (HYPOGLYCEMIA EVENT) - NS PROVIDER CONTACT BACKGROUND-HYPO
Age: 66y    Gender: Female    POCT Blood Glucose:  120 mg/dL (01-29-22 @ 22:41)  64 mg/dL (01-29-22 @ 22:17)  63 mg/dL (01-29-22 @ 22:15)  100 mg/dL (01-29-22 @ 17:33)  102 mg/dL (01-29-22 @ 13:30)  127 mg/dL (01-29-22 @ 09:36)      eMAR:  insulin glargine Injectable (LANTUS)   5 Unit(s) SubCutaneous (01-29-22 @ 23:01)    insulin lispro Injectable (ADMELOG)   7 Unit(s) SubCutaneous (01-29-22 @ 17:48)   7 Unit(s) SubCutaneous (01-29-22 @ 13:35)   7 Unit(s) SubCutaneous (01-29-22 @ 09:37)    
Age: 66y    Gender: Female    POCT Blood Glucose:  142 mg/dL (01-24-22 @ 09:21)  59 mg/dL (01-24-22 @ 08:57)  54 mg/dL (01-24-22 @ 08:55)  245 mg/dL (01-23-22 @ 22:04)  480 mg/dL (01-23-22 @ 17:54)  491 mg/dL (01-23-22 @ 17:53)  396 mg/dL (01-23-22 @ 13:25)  401 mg/dL (01-23-22 @ 12:19)      eMAR:dexAMETHasone  Injectable   6 milliGRAM(s) IV Push (01-24-22 @ 06:56)    insulin glargine Injectable (LANTUS)   22 Unit(s) SubCutaneous (01-23-22 @ 22:29)    insulin lispro (ADMELOG) corrective regimen sliding scale   12 Unit(s) SubCutaneous (01-23-22 @ 18:16)   10 Unit(s) SubCutaneous (01-23-22 @ 13:27)    insulin lispro (ADMELOG) corrective regimen sliding scale   0 Unit(s) SubCutaneous (01-23-22 @ 22:28)    insulin lispro Injectable (ADMELOG)   6 Unit(s) SubCutaneous (01-23-22 @ 13:27)    insulin lispro Injectable (ADMELOG)   8 Unit(s) SubCutaneous (01-23-22 @ 18:16)    
Age: 66y    Gender: Female    POCT Blood Glucose:  142 mg/dL (01-24-22 @ 09:21)  59 mg/dL (01-24-22 @ 08:57)  54 mg/dL (01-24-22 @ 08:55)  245 mg/dL (01-23-22 @ 22:04)  480 mg/dL (01-23-22 @ 17:54)  491 mg/dL (01-23-22 @ 17:53)  396 mg/dL (01-23-22 @ 13:25)  401 mg/dL (01-23-22 @ 12:19)      eMAR:dexAMETHasone  Injectable   6 milliGRAM(s) IV Push (01-24-22 @ 06:56)    insulin glargine Injectable (LANTUS)   22 Unit(s) SubCutaneous (01-23-22 @ 22:29)    insulin lispro (ADMELOG) corrective regimen sliding scale   12 Unit(s) SubCutaneous (01-23-22 @ 18:16)   10 Unit(s) SubCutaneous (01-23-22 @ 13:27)    insulin lispro (ADMELOG) corrective regimen sliding scale   0 Unit(s) SubCutaneous (01-23-22 @ 22:28)    insulin lispro Injectable (ADMELOG)   6 Unit(s) SubCutaneous (01-23-22 @ 13:27)    insulin lispro Injectable (ADMELOG)   8 Unit(s) SubCutaneous (01-23-22 @ 18:16)    
Age: 66y    Gender: Female    POCT Blood Glucose:  165 mg/dL (01-30-22 @ 22:40)  63 mg/dL (01-30-22 @ 22:08)  64 mg/dL (01-30-22 @ 22:07)  353 mg/dL (01-30-22 @ 17:41)  251 mg/dL (01-30-22 @ 12:38)  145 mg/dL (01-30-22 @ 09:24)      eMAR:  insulin glargine Injectable (LANTUS)   5 Unit(s) SubCutaneous (01-30-22 @ 22:52)    insulin lispro (ADMELOG) corrective regimen sliding scale   5 Unit(s) SubCutaneous (01-30-22 @ 18:09)   3 Unit(s) SubCutaneous (01-30-22 @ 12:43)    insulin lispro Injectable (ADMELOG)   5 Unit(s) SubCutaneous (01-30-22 @ 18:10)   5 Unit(s) SubCutaneous (01-30-22 @ 12:43)   5 Unit(s) SubCutaneous (01-30-22 @ 09:45)    
Age: 66y    Gender: Female    POCT Blood Glucose:  90 mg/dL (01-22-22 @ 17:43)  236 mg/dL (01-22-22 @ 12:50)  126 mg/dL (01-22-22 @ 09:31)  128 mg/dL (01-22-22 @ 09:31)  85 mg/dL (01-22-22 @ 09:19)  52 mg/dL (01-22-22 @ 09:00)  52 mg/dL (01-22-22 @ 08:59)  345 mg/dL (01-21-22 @ 22:47)      eMAR:dexAMETHasone  Injectable   6 milliGRAM(s) IV Push (01-22-22 @ 06:46)    insulin glargine Injectable (LANTUS)   14 Unit(s) SubCutaneous (01-21-22 @ 23:13)    insulin lispro (ADMELOG) corrective regimen sliding scale   4 Unit(s) SubCutaneous (01-22-22 @ 13:17)    insulin lispro (ADMELOG) corrective regimen sliding scale   4 Unit(s) SubCutaneous (01-21-22 @ 22:48)    insulin lispro Injectable (ADMELOG)   6 Unit(s) SubCutaneous (01-22-22 @ 17:50)   6 Unit(s) SubCutaneous (01-22-22 @ 13:17)   6 Unit(s) SubCutaneous (01-21-22 @ 22:50)    insulin lispro Injectable (ADMELOG).   3 Unit(s) SubCutaneous (01-22-22 @ 10:20)

## 2022-01-31 NOTE — PROGRESS NOTE ADULT - SUBJECTIVE AND OBJECTIVE BOX
Dr. Cantrell [facility] Medicine  Pager [phone]    Patient is a 66y old  Female who presents with a chief complaint of SOB (29 Jan 2022 11:40)      INTERVAL HPI/OVERNIGHT EVENTS: Spoke to patient in [place]. Reports some back pain. No new concerns in terms of her respirations. Eating breakfast. Wants to go home later today    MEDICATIONS  (STANDING):  budesonide 160 MICROgram(s)/formoterol 4.5 MICROgram(s) Inhaler 2 Puff(s) Inhalation two times a day  calcium carbonate 1250 mG  + Vitamin D (OsCal 500 + D) 1 Tablet(s) Oral two times a day  dextrose 40% Gel 15 Gram(s) Oral once  dextrose 5%. 1000 milliLiter(s) (50 mL/Hr) IV Continuous <Continuous>  dextrose 5%. 1000 milliLiter(s) (100 mL/Hr) IV Continuous <Continuous>  dextrose 50% Injectable 25 Gram(s) IV Push once  dextrose 50% Injectable 12.5 Gram(s) IV Push once  dextrose 50% Injectable 25 Gram(s) IV Push once  enoxaparin Injectable 30 milliGRAM(s) SubCutaneous daily  famotidine    Tablet 40 milliGRAM(s) Oral at bedtime  ferrous    sulfate 325 milliGRAM(s) Oral daily  fluticasone propionate 50 MICROgram(s)/spray Nasal Spray 1 Spray(s) Both Nostrils two times a day  glucagon  Injectable 1 milliGRAM(s) IntraMuscular once  insulin glargine Injectable (LANTUS) 5 Unit(s) SubCutaneous at bedtime  insulin lispro (ADMELOG) corrective regimen sliding scale   SubCutaneous three times a day before meals  insulin lispro (ADMELOG) corrective regimen sliding scale   SubCutaneous at bedtime  insulin lispro Injectable (ADMELOG) 5 Unit(s) SubCutaneous three times a day before meals  ketotifen 0.025% Ophthalmic Solution 1 Drop(s) Both EYES two times a day  lactobacillus acidophilus 1 Tablet(s) Oral two times a day  levoFLOXacin  Tablet 500 milliGRAM(s) Oral every 24 hours  losartan 50 milliGRAM(s) Oral daily  metoprolol succinate ER 50 milliGRAM(s) Oral daily  mirtazapine 7.5 milliGRAM(s) Oral at bedtime  montelukast 10 milliGRAM(s) Oral daily  nystatin    Suspension 145234 Unit(s) Oral four times a day  pantoprazole    Tablet 40 milliGRAM(s) Oral before breakfast  tiotropium 18 MICROgram(s) Capsule 1 Capsule(s) Inhalation daily  venlafaxine XR. 37.5 milliGRAM(s) Oral daily    MEDICATIONS  (PRN):  acetaminophen     Tablet .. 650 milliGRAM(s) Oral every 6 hours PRN Temp greater or equal to 38C (100.4F), Mild Pain (1 - 3), Moderate Pain (4 - 6)  ALBUTerol    90 MICROgram(s) HFA Inhaler 2 Puff(s) Inhalation every 6 hours PRN Shortness of Breath and/or Wheezing  benzocaine 15 mG/menthol 3.6 mG Lozenge 1 Lozenge Oral every 6 hours PRN Sore Throat  hydrocortisone hemorrhoidal Suppository 1 Suppository(s) Rectal two times a day PRN Rectal pain/hemorrhoids  simethicone 80 milliGRAM(s) Chew every 8 hours PRN Gas    Allergies  No Known Allergies    Intolerances    REVIEW OF SYSTEMS:  Please see interval HPI:    Vital Signs Last 24 Hrs  T(C): 36.8 (31 Jan 2022 06:45), Max: 36.8 (30 Jan 2022 21:38)  T(F): 98.2 (31 Jan 2022 06:45), Max: 98.2 (30 Jan 2022 21:38)  HR: 61 (31 Jan 2022 07:35) (61 - 80)  BP: 122/68 (31 Jan 2022 06:45) (122/68 - 132/62)  BP(mean): --  RR: 18 (31 Jan 2022 06:45) (18 - 18)  SpO2: 99% (31 Jan 2022 07:35) (95% - 99%)    PHYSICAL EXAM:  GENERAL: NAD, sitting in bed, very thin  HEAD:  NC/AT  EYES: EOMI, clear sclera/conjunctiva  ENMT: MMM, sticks out tongue, thrush appears to improving  NECK: supple  NERVOUS SYSTEM: moving extremities, non-focal   CHEST/LUNG: NC in place, no respiratory distress on supplemental O2, coarse BS  HEART: S1S2 RRR  ABDOMEN: soft, non-tender  EXTREMITIES:  no c/c/e    LABS:                                   12.4   8.66  )-----------( 145      ( 31 Jan 2022 07:38 )             39.7   01-31    137  |  96<L>  |  13  ----------------------------<  152<H>  4.1   |  40<H>  |  0.37<L>    Ca    8.9      31 Jan 2022 07:38  Phos  2.8     01-31  Mg     2.00     01-31      CAPILLARY BLOOD GLUCOSE  POCT Blood Glucose.: 255 mg/dL (31 Jan 2022 12:16)  POCT Blood Glucose.: 195 mg/dL (31 Jan 2022 08:49)  POCT Blood Glucose.: 165 mg/dL (30 Jan 2022 22:40)  POCT Blood Glucose.: 63 mg/dL (30 Jan 2022 22:08)  POCT Blood Glucose.: 64 mg/dL (30 Jan 2022 22:07)  POCT Blood Glucose.: 353 mg/dL (30 Jan 2022 17:41)      BLOOD CULTURE    RADIOLOGY & ADDITIONAL TESTS:    Imaging Personally Reviewed:  [ ] YES     Consultant(s) Notes Reviewed:      Care Discussed with Consultants/Other Providers: LION

## 2022-01-31 NOTE — PROGRESS NOTE ADULT - PROBLEM SELECTOR PROBLEM 3
Hypertension
Oropharyngeal candidiasis
Oropharyngeal candidiasis
Hypertension

## 2022-01-31 NOTE — PROGRESS NOTE ADULT - PROBLEM SELECTOR PLAN 9
VTE ppx: lovenox    Dispo: anticipate d/c home today as d/w patient and son, has home O2, pulm f/u appointment scheduled for 2/2, to complete course levaquin otpt as per ID    Time spent 40 mins
VTE ppx: lovenox    Dispo: anticipate d/c home Monday as d/w patient and son, has home O2, pulm f/u appointment scheduled for 2/2, to complete course levaquin otpt as per ID

## 2022-01-31 NOTE — PROGRESS NOTE ADULT - PROBLEM SELECTOR PLAN 7
- cont Mirtazapine
# Severe protein calorie malnutrition  - BMI 15.1 on 1/21  - c/w diet, advanced to minced and moist per patient request (appears to be tolerating consistency), c/w glucerna supplementation as per nutrition recs
# Severe protein calorie malnutrition  - BMI 15.1 on 1/21  - c/w diet, advanced to minced and moist per patient request (appears to be tolerating consistency), c/w glucerna supplementation as per nutrition recs
- cont Mirtazapine

## 2022-02-01 ENCOUNTER — NON-APPOINTMENT (OUTPATIENT)
Age: 67
End: 2022-02-01

## 2022-02-01 LAB
GAD65 AB SER-MCNC: 2.78 NMOL/L — HIGH
ISLET CELL512 AB SER-ACNC: SIGNIFICANT CHANGE UP

## 2022-02-02 ENCOUNTER — APPOINTMENT (OUTPATIENT)
Dept: PULMONOLOGY | Facility: CLINIC | Age: 67
End: 2022-02-02
Payer: MEDICAID

## 2022-02-02 ENCOUNTER — NON-APPOINTMENT (OUTPATIENT)
Age: 67
End: 2022-02-02

## 2022-02-02 DIAGNOSIS — E08.49 DIABETES MELLITUS DUE TO UNDERLYING CONDITION WITH OTHER DIABETIC NEUROLOGICAL COMPLICATION: ICD-10-CM

## 2022-02-02 PROCEDURE — 99214 OFFICE O/P EST MOD 30 MIN: CPT | Mod: 95

## 2022-02-02 NOTE — ASSESSMENT
[FreeTextEntry1] : Ms. Sky is a 66 year old woman with probable PCD, characterized by sinusitis, bronchiectasis and recurrent infections. Recently hospitalized in January for 2 weeks, COVID positive, grew pseudomonas and ESBL Klebsiella in sputum.  She was treated with Meropenam for 10 days and remains on Levaquin orally to complete total 2 week course. Had venous blood gases with high pcO2s in the hospital, most recent 74, which was improved.  ABG on 6/29/21 was 7.28/92/109 and after wearing nocturnal ventilation she improved to 7.39/76/53. She is on a bronchodilator regimen and a clearance regimen. She is using amikacin via nebulizer on alternate month basis.     She requires nocturnal ventilation for chronic hypercapnic respiratory failure and demonstrated improvement in CO2 levels while wearing it in the hospital. Continues to wear it at home nocturnally and reports feeling better. \par Currently improved at home on levaquin, bronchodilator and airway clearance regimen and nocturnal ventilation.\par Concerning issue is her low BMI.  She is on mirtazapine to stimulate appetite, takes glucerna for supplementation and family is preparing foods for her to eat several times a day.  She is concerned about glucose levels.  Was referred to [facility] for endocrinology appointment.  I will help expedite once a provider is identified.  \elsie May be a candidate for lung transplant in future if nutritional status can be improved.\par Other issue is that she is not vaccinated for COVID.\par \par Plan:\par 1-Continue airway regimen, will order new nebulizer machine for her.\par 2- endocrine followup\par 3- advised to start amikacin on 3/1/22 and to follow up with me in the office in March as well.  \par 4- Advised to schedule COVID vaccination.

## 2022-02-02 NOTE — HISTORY OF PRESENT ILLNESS
[Home] : at home, [unfilled] , at the time of the visit. [Medical Office: ([facility])___] : at the medical office located in  [Family Member] : family member [Verbal consent obtained from patient] : the patient, [unfilled] [TextBox_4] : Ms. Sky is a 66 year-old with PMH bronchiectasis maintained on an airway regimen that includes:\par albuterol hypertonic saline, Aerobika TID\par Spiriva daily\par Symbicort twice daily\par Also using nocturnal ventilation- ivaps, EPAP 5 PS 15-25, RR 18 with , pressures ranging 20/5. \par \par She reports that she is unable to tolerate inhaled amikacin, similar to the tobramycin- she experienced shaking in her body and soreness of her mouth.  \par Her active issues have been recurrent episodes of bronchiectasis exacerbation, persistent weight loss, and inability to eat, secondary to nausea/early satiety.  \par Also noted to have a "cirrhotic" appearing liver on imaging.  Saw Dr. 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  HVPG only 2mmHg- normal\par elevated ALP. Other LFTs normal  \par HCV Ab+, HCV RNA negative 4/2021; A1AT normal in past. \par \par Hospitalized at [facility] after last tele visit with me 2 weeks ago.  She reported fever, increased sputum production and on admission found to be COVID positive.  .  Her oxygen requirement did not worsen- required 2 L NC.  CXR did not show pneumonia.  Sputum was positive for ESBL klebsiella and pseudomonas.  She was treated with meropenam for 10 days, levaquin and discharged to complete course of levaquin at home for total of 2 weeks.  Also receied Remdesivir and Decadron.  Also maintained on mirtazapine as appetitue stimulant.\par \par Follow up televisit today post discharge from [facility].  \par \par looks well, states breathing is improved, less mucous production- to complete additional 10 days of Levaquin.  Also completed prednisone. \par Appetite is still not great.  Son states he has an appointment with endocrinologist in May.  He received guidelines in the hospital from the endocrinologist.  \par \par Continues to use her airway regimen.  albuterol followed by hypertonic saline and aerobika 2sx a day in addition to symbicort and Spiriva.\par \par Advised to continue to use nocturnal ventilation.  Last venous CO2 in January was 74 while hospitalized, serum HCO3 was 40 suggesting ongoing CO2 retention.

## 2022-02-10 ENCOUNTER — NON-APPOINTMENT (OUTPATIENT)
Age: 67
End: 2022-02-10

## 2022-02-23 ENCOUNTER — APPOINTMENT (OUTPATIENT)
Dept: PULMONOLOGY | Facility: CLINIC | Age: 67
End: 2022-02-23
Payer: MEDICAID

## 2022-02-23 PROCEDURE — 99215 OFFICE O/P EST HI 40 MIN: CPT | Mod: 95

## 2022-02-24 NOTE — HISTORY OF PRESENT ILLNESS
[TextBox_4] : Mrs. Sky is experiencing increasing sputum prodution and cough, reports subjective fever and increasing hypoxemia, desaturating into 80s when ambulating to the bathroom.  \par \par She is using albuterol, hypersal and aerobika twice daily, symbicort 2 puffs twice daily and Spiriva.  Remains on mirtazapine.  \par \par She self treated with an antibiotic from [facility] and prednisone 40mg daiy for a few days.  Currently on prednisone 30mg daily.  \par \par She has not been able to wear her AVAPS secondary to copiousness of secretions and the strap on her mask broke. \par I advised her to fo to the ER last night and she wanted to try and be treated at home.

## 2022-02-24 NOTE — ASSESSMENT
[FreeTextEntry1] : Ms. Sky is a 66 year old woman with probable PCD, characterized by sinusitis, bronchiectasis and recurrent infections. Recently hospitalized in January for 2 weeks, COVID positive, grew pseudomonas and ESBL Klebsiella in sputum.  She was treated with Meropenam for 10 days and remains on Levaquin orally to complete total 2 week course. Had venous blood gases with high pcO2s in the hospital, most recent 74, which was improved.  ABG on 6/29/21 was 7.28/92/109 and after wearing nocturnal ventilation she improved to 7.39/76/53. She is on a bronchodilator regimen and a clearance regimen. She is using amikacin via nebulizer on alternate month basis.     She requires nocturnal ventilation for chronic hypercapnic respiratory failure and demonstrated improvement in CO2 levels while wearing it in the hospital. \par Currently appears to be experiencing another bronchiectasis exacerbation.  She has increased sputum production, cough and hypoxemia on exertion.   Unable to wear ask as strap has broken and secretions are copious.\par Advised admission to the hospital, but she prefers to try to remain at home. BMi has been stable since discharge.  On mirtazapine and glucerna.  \par \par Plan:\par 1-Continue bronchodilator and airway clearance regimen.\par 2-Levaquin 500mg daily for 10 days, Advised to start using inhaled amikacin twice daily as well\par 3- Prescribed prednisone with taper over two weeks\par 4- sputum for C and S (CF culture)  daughter in law to bring in sample . Will modify antibioitcs if not improved based on results.  \par 5- In office follow up visit next week.  \par 6- urgent request was sent to her DME company to replace Mask- she wears air fit F20, full face mask, M\par \par 2- endocrine followup\par 3- advised to start amikacin on 3/1/22 and to follow up with me in the office in March as well.  \par 4- Advised to schedule COVID vaccination.

## 2022-02-24 NOTE — REASON FOR VISIT
[Home] : at home, [unfilled] , at the time of the visit. [Other Location: e.g. Home (Enter Location, City,State)___] : at [unfilled] [Family Member] : family member [Verbal consent obtained from patient] : the patient, [unfilled] [Acute] : an acute visit

## 2022-03-01 LAB — BACTERIA SPT CF RESP CULT: ABNORMAL

## 2022-03-07 NOTE — HISTORY OF PRESENT ILLNESS
[TextBox_4] : Mrs. Sky had a recent bronchiectasis exacerbation and she was treated initially with levaquin then switched to Ciprofloxacin 500BID when sputum culture showed E. meningoseptica (polymyxin resistant) and P. aerugionosa, (polymyxin screen sensitive). Both were sensitive to Ciprofloxacin.  \par \par Needs repeat CF labs,\par Needs transplant referral.  \par \par Did she get a new mask\par Weight?\par

## 2022-03-08 ENCOUNTER — APPOINTMENT (OUTPATIENT)
Dept: PULMONOLOGY | Facility: CLINIC | Age: 67
End: 2022-03-08

## 2022-03-14 ENCOUNTER — RX RENEWAL (OUTPATIENT)
Age: 67
End: 2022-03-14

## 2022-03-15 ENCOUNTER — APPOINTMENT (OUTPATIENT)
Dept: PULMONOLOGY | Facility: CLINIC | Age: 67
End: 2022-03-15

## 2022-03-31 ENCOUNTER — RX RENEWAL (OUTPATIENT)
Age: 67
End: 2022-03-31

## 2022-04-20 LAB — RHODAMINE-AURAMINE STN SPEC: NORMAL

## 2022-05-12 ENCOUNTER — APPOINTMENT (OUTPATIENT)
Dept: PULMONOLOGY | Facility: CLINIC | Age: 67
End: 2022-05-12

## 2022-05-12 ENCOUNTER — APPOINTMENT (OUTPATIENT)
Dept: PULMONOLOGY | Facility: CLINIC | Age: 67
End: 2022-05-12
Payer: MEDICAID

## 2022-05-12 DIAGNOSIS — Z22.39 CARRIER OF OTHER SPECIFIED BACTERIAL DISEASES: ICD-10-CM

## 2022-05-12 PROCEDURE — 99215 OFFICE O/P EST HI 40 MIN: CPT | Mod: 95

## 2022-05-12 RX ORDER — FLUTICASONE PROPIONATE 50 UG/1
50 SPRAY, METERED NASAL
Qty: 16 | Refills: 0 | Status: ACTIVE | COMMUNITY
Start: 2019-05-20 | End: 1900-01-01

## 2022-05-12 RX ORDER — VENLAFAXINE HYDROCHLORIDE 37.5 MG/1
37.5 CAPSULE, EXTENDED RELEASE ORAL
Qty: 30 | Refills: 0 | Status: ACTIVE | COMMUNITY
Start: 2022-02-14

## 2022-05-12 NOTE — ASSESSMENT
[FreeTextEntry1] : Ms. Sky is a 66 year old woman with probable PCD, characterized by sinusitis, bronchiectasis and recurrent infections. Recently hospitalized in January for 2 weeks, COVID positive, grew pseudomonas and ESBL Klebsiella in sputum.  She was treated with Meropenam for 10 days and remains on Levaquin orally to complete total 2 week course. Had venous blood gases with high pcO2s in the hospital, most recent 74, which was improved.  ABG on 6/29/21 was 7.28/92/109 and after wearing nocturnal ventilation she improved to 7.39/76/53. She is on a bronchodilator regimen and a clearance regimen. She is using amikacin via nebulizer on alternate month basis.     She requires nocturnal ventilation for chronic hypercapnic respiratory failure and demonstrated improvement in CO2 levels while wearing it in the hospital. \par Currently appears to be experiencing another bronchiectasis exacerbation.  She has increased sputum production, cough is post a self administered course of Augmentin.She has missed several apointments and has stopped inhaled antibiotic, mirtazapine and use of Acapella valve.  \par \par Plan:\par 1-Continue bronchodilator and airway clearance regimen.\par 2- Cipro 500 BID prescribed for 7 days. Advised to start using inhaled amikacin twice daily as well\par 3- endocrine followup\par Renewed all meds- including inhaled amikacin and mirtazapine.  Confirmed with pharmacist that she is taking venlaxafine daily.  \par Advised in office follow up within the next 2 weeks.

## 2022-05-12 NOTE — HISTORY OF PRESENT ILLNESS
[Home] : at home, [unfilled] , at the time of the visit. [Medical Office: ([facility])___] : at the medical office located in  [Verbal consent obtained from patient] : the patient, [unfilled] [TextBox_4] : Ms. Sky is a 66 year old woman with probable PCD, characterized by sinusitis, bronchiectasis and recurrent infections. Recently hospitalized in January for 2 weeks, COVID positive, grew pseudomonas and ESBL Klebsiella in sputum.  She was treated with Meropenam for 10 days and Levaquinon discharge and completed total 2 week course. Had venous blood gases with high pcO2s in the hospital, most recent 74, which was improved.  ABG on 6/29/21 was 7.28/92/109 and after wearing nocturnal ventilation she improved to 7.39/76/53. She is on a bronchodilator regimen and a clearance regimen. She is using amikacin via nebulizer on alternate month basis.     She requires nocturnal ventilation for chronic hypercapnic respiratory failure and demonstrated improvement in CO2 levels while wearing it in the hospital. \par In Fevruary 2022, she had another bronchiectasis exacerbation characterized by copious sputum production.  \par She was unable to wear AVAPS at home secondary to secretion burden.\par \par She was treated with levaquin initially and then switched to Ciprofloxacin when sputum cultures grew :\par   pseudomonas and elizabethkingia meningospectica, both sensitive to Cipro\par \par She is using albuterol, hypersal and aerobika twice daily, symbicort 2 puffs twice daily and Spiriva.  Remains on mirtazapine.  \par \par She self treated with an antibiotic from [facility] and prednisone 40mg daiy for a few days.  Currently on prednisone 30mg daily.  \par \par She had not been able to wear her AVAPS secondary to copiousness of secretions and the strap on her mask broke. \par I advised her to fo to the ER last night and she wanted to try and be treated at home.  \par \par I initially prescribed Levaquin but sputum then grew Pseudomonas and e. meningospeptika both polymixin B resistant and I ordered cipro 500 BID for 7 days.\par \par Unable to come in- requests televisit follow up today:\par Accompanied by son at [phone].  \par Continues to experience thick mucous, no fever, She took one course of antibiotics on her own- augmentin for 5 days twice daily.  One month earlier she took prednisone for one week.  She is using the vest, \par Using the machine every day. - AVAPS but having difficulty with tubing. \par Weight is 88 pounds.  She stopped taking mirtazapine.  \par She is not using inhaled amikacin.  \par \par Not using Acapella.\par

## 2022-05-21 NOTE — PROGRESS NOTE ADULT - PROBLEM SELECTOR PLAN 5
Labs - A1C noted ot be 8.7% 8/2020  - Continuing on home Lantus 14U qHS and Humalog TID before meals 8U  - FS controlled w/ additional ISS Labs/Medications

## 2022-05-24 ENCOUNTER — APPOINTMENT (OUTPATIENT)
Dept: PULMONOLOGY | Facility: CLINIC | Age: 67
End: 2022-05-24
Payer: MEDICAID

## 2022-05-24 ENCOUNTER — APPOINTMENT (OUTPATIENT)
Dept: ENDOCRINOLOGY | Facility: CLINIC | Age: 67
End: 2022-05-24

## 2022-05-24 VITALS
DIASTOLIC BLOOD PRESSURE: 88 MMHG | HEART RATE: 68 BPM | RESPIRATION RATE: 17 BRPM | SYSTOLIC BLOOD PRESSURE: 180 MMHG | HEIGHT: 64 IN | OXYGEN SATURATION: 93 % | TEMPERATURE: 97.5 F | WEIGHT: 94 LBS | BODY MASS INDEX: 16.05 KG/M2

## 2022-05-24 DIAGNOSIS — R93.89 ABNORMAL FINDINGS ON DIAGNOSTIC IMAGING OF OTHER SPECIFIED BODY STRUCTURES: ICD-10-CM

## 2022-05-24 DIAGNOSIS — R13.10 DYSPHAGIA, UNSPECIFIED: ICD-10-CM

## 2022-05-24 DIAGNOSIS — J30.81 ALLERGIC RHINITIS DUE TO ANIMAL (CAT) (DOG) HAIR AND DANDER: ICD-10-CM

## 2022-05-24 PROCEDURE — 99215 OFFICE O/P EST HI 40 MIN: CPT

## 2022-05-24 NOTE — REVIEW OF SYSTEMS
[Chest Tightness] : chest tightness [Sputum] : sputum [Dyspnea] : dyspnea [Wheezing] : wheezing [SOB on Exertion] : sob on exertion [Arthralgias] : arthralgias [Negative] : Cardiovascular [Fever] : no fever [Fatigue] : no fatigue [Recent Wt Gain (___ Lbs)] : ~T no recent weight gain [Poor Appetite] : no poor appetite [Recent Wt Loss (___ Lbs)] : ~T no recent weight loss [Cough] : no cough

## 2022-05-24 NOTE — ASSESSMENT
[FreeTextEntry1] : Patient is a 68 yo F w/ probable PCD (characterized by sinusitis, bronchiectasis and recurrent infections), recent COVID (1/2022, hospitalized x 2 weeks and also treated with Meropenem for Pseudomonas and ESBL Klebsiella in sputum), chronic hypoxemic (O2 2 to 4L nc) and hypercapnic respiratory failure (On AVAPS) who presents to pulmonary clinic for follow up. \par \par #Chronic Respiratory failure 2/2 bronchiectasis. Previous cultures have grown Pseudomonas and Elizabethkingia meningospectica. Last PFT 11/2017 with severe obstruction and mild DLCO decrease. Last TTE 6/2021 overall normal but unable to estimate PASP\par -  c/w Albuterol, Hypersal, Aerobika TID\par - c/w Spiriva, Symbicort 160 BID, Singulair\par - Nebulized Amikacin BID sent to pharmacy, called who confirmed receipt\par - c/w supplemental O2 and AVAPS\par - Repeat PFT and exercise oximetry ordered\par - Transplant eval after repeat PFTs\par - Will repeat CF panel\par -#Dysphagia - Cinesphogram 2019 with evidence of laryngeal penetration but no aspiration. \par - Will order MBS\par - Continue AVAPS nocturnally- has chronic hypercapnea and has benefitted from NIV nocturnally.  Elevation of CO2 can lead to recurrent hospitalizations, respiratory failure and death. \par #HCM\par - Unsure if she had received pneumococcal vaccination at PMD, will find out and bring in information next visit\par \par RTC 3 months\par \par Caleb Bailey\par d/w Dr. Young

## 2022-05-24 NOTE — HISTORY OF PRESENT ILLNESS
[TextBox_4] : Leandro speaking, accompained Daughter in law Suki who is translating. \par \par Patient is a 65 yo F w/ probable PCD (characterized by sinusitis, bronchiectasis and recurrent infections), recent COVID (1/2022, hospitalized x 2 weeks and also treated with Meropenem for Pseudomonas and ESBL Klebsiella in sputum), chronic hypoxemic (O2 2 to 4L nc) and hypercapnic respiratory failure (On AVAPS) who presents to pulmonary clinic for follow up. \par \par Patient was last seen via televisit on 5/12/22 during which she was continued on her bronchodilators and ACT. She was also prescribed Cipro 500 BID x 7 days and advised to start using her inhaled Amikacin BID. Endorses her productive cough improved on oral Cipro which she finished the day prior and since then sputum has worsened a bit again. Endorses productive cough with clear sputum. Denies any fevers, chills, LE swelling, orthopnea, PND. Endorses compliance with her AVAPS machine, brought in mask and tubing with broken parts. \par \par Of note, patient is on Albuterol, Hypersal, Aerobika TID, and Spiriva, Symbicort 160 BID, Singulair. She is prescribed Amikacin but has not been using it. She also does not have their prescribed mirtazipine. \par \par Previous cultures have grown Pseudomonas and Elizabethkingia meningospectica, both sensitive to Cipro\par \par Last PFT 11/2017 with severe obstruction and mild DLCO decrease.\par Last TTE 6/2021 overall normal but unable to estimate PASP\par \par Patient reports that she did not receive supplies for NIV ( prescription sent mid March 2022) Also reports that mirtazapine not at pharmacy and that she did not reciev inhaled Amikacin.  \par Contacted her [employer] company [employer] who stated that supplies were delivered to patient on 3/26/22.  When asked daughter, she said that they moved on 3/1/22 to [place] and she did not update her address.\par Pharmacy states that someone picked up mirtazapine from her family, ( daughter in law unaware) We put in a new script for Amikacin and called pharmacy to confirm receipt.\par She is complaining of mucous production and mouth sores.

## 2022-05-24 NOTE — PHYSICAL EXAM
[No Acute Distress] : no acute distress [Normal Oropharynx] : normal oropharynx [Normal Appearance] : normal appearance [Normal Rate/Rhythm] : normal rate/rhythm [Normal S1, S2] : normal s1, s2 [No Resp Distress] : no resp distress [Clear to Auscultation Bilaterally] : clear to auscultation bilaterally [Benign] : benign [Normal Gait] : normal gait [No Clubbing] : no clubbing [No Edema] : no edema [Oriented x3] : oriented x3 [TextBox_11] : no thrush or lesions noted on palate or mucosa.  She has ill fitting dentrues.

## 2022-06-15 ENCOUNTER — NON-APPOINTMENT (OUTPATIENT)
Age: 67
End: 2022-06-15

## 2022-06-16 RX ORDER — SODIUM CHLORIDE FOR INHALATION 0.9 %
0.9 VIAL, NEBULIZER (ML) INHALATION
Qty: 2 | Refills: 0 | Status: ACTIVE | COMMUNITY
Start: 2022-06-16 | End: 1900-01-01

## 2022-06-20 NOTE — PROGRESS NOTE ADULT - ASSESSMENT
Thank you for allowing us to care for you today  Your vital signs and bloodwork were normal  We are reassured that your body is appropriately compensating for this heavy period  Please stay hydrated and take the prescribed tylenol as needed for pain  I also encourage you to use a heating pad for your lower abdominal cramping  Please also take the prescribed zofran as needed for nausea  Please return to the ED if you develop worsening lightheadedness/dizziness, pass out, or if you develop severe abdominal pain  I called your primary OBGYN office and attempted to make an appointment for you  They are expecting your call to schedule an emergency room follow up appointment  65 yo F w/ diffuse cystic bronchiectasis and primary ciliary dyskinesia, on chronic home O2 and AVAPS qhs, ABPA, HTN, GERD, DM2 on long term insulin therapy, depression, liver cirrhosis and CBD stricture, recent admission for  pseudomonas PNA, admitted w/ acute on chronic hypoxic/hypercapnic respiratory failure 2/2 COVID19/bronchiectasis flare and concern for superimposed bacterial pneumonia, course c/b episodes of hypoglycemia.     # Acute on chronic hypoxic/hypercapnic respiratory failure, COVID19 infection, bronchiectasis, potential superimposed pneumonia  - pulm and ID input appreciated  - c/w duonebs, ATC 3% saline, aerobika device  - c/w supplemental O2, AVAPS qhs, monitor respiratory status closely  - symptomatic management of cough  - c/w IV meropenem as per ID (while inpatient)  - ID recs appreciated, sputum cx polymicrobial w/ pseudomonas, klebsiella, elizabethkingia, sensitivities noted, elizabethkingia resistant to meropenem, levofloxacin added to regimen, per ID on discharge likely levaquin total of 2 weeks  - s/p remdesivir 5 day course  - c/w dexamethasone for 10 day course (to end 1/28)  - c/w montelukast, Spiriva, Symbicort Flonase    # DM2 c/b steroid induced hyperglycemia and also by episodes of hypoglycemia  - no hypoglycemia noted overnight, PO intake also appears to be improving w/ management of mouth discomfort  - to increase insulin regimen as per endocrine (lantus to 7U, premeal admelog to 7U tid)  - patient still w/ episodes of steroid induced hyperglycemia  - continue to monitor FS and adjust basal/bolus regimen as needed, monitor FS, continue w/ sliding scale coverage  - A1C 9.4  - endocrine assistance appreciated, will f/u further recs, regimen likely will need additional adjustment as patient completes course of steroids...   - patient able to demonstrate successful insulin PEN administration w/ [facility] pharmacist    # Loose BM  - reports loose bowel movements  - hold bowel regimen, reassess   - monitor stool counts, if persistent, watery, check stool studies, GI PCR, c.diff    # Essential HTN  - c/w losartan, toprol xl    # GERD  - c/w protonix, pepcid    # Depression  c/w remeron, resumed home effexor (meds from other sources indicate was prescribed effexor 37.5 mg ER, last filled in December)    # oropharyngeal thrush  - nystatin suspension as per pulm, appears to be improving, c/w symptomatic management of mouth discomfort    VTE ppx: lovenox    Dispo: pending improvement in respiratory status, antibiotic course, endocrine optimization

## 2022-06-23 LAB — CFTR MUT TESTED BLD/T: NEGATIVE

## 2022-07-21 DIAGNOSIS — Z01.818 ENCOUNTER FOR OTHER PREPROCEDURAL EXAMINATION: ICD-10-CM

## 2022-07-21 NOTE — HISTORY OF PRESENT ILLNESS
[TextBox_4] : Patient is a 65 yo F w/ probable PCD (characterized by sinusitis, bronchiectasis and recurrent infections), recent COVID (1/2022, hospitalized x 2 weeks and also treated with Meropenem for Pseudomonas and ESBL Klebsiella in sputum), chronic hypoxemic (O2 2 to 4L) and hypercapnic respiratory failure (On AVAPS) who presents for lung transplant eval \par \par \par When/how diagnosed with primary pulm dx?\par \par Pulmonolgoist: Dr. Young\par Hepatologist: Dr. 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 HVPG only 2mmHg- normal\par elevated ALP. Other LFTs normal  \par HCV Ab+, HCV RNA negative 4/2021; A1AT normal in past. \par \par PMHX: malntutrion/underweight (BMI 16), DM (Hgb A1c- 8.7%)\par PSH:\par \par \par Meds:\par \par \par Allergies:\par \par \par Oxygen requirement *** at rest *** on exertion *** at night\par \par Quality of life:\par \par Functional status:\par [] performs activities of daily living with NO assistance\par [] performs activities of daily living with SOME assistance\par [] performs activities of daily living with TOTAL assistance\par \par Exposures:\par \par Prior hospitalizations, ICU admission or intubations: yes, prior hospitalizations\par \par Hx covid infection, blood transfusions or pregnancies:\par \par Health maintenance/vaccines:\par COVID vax: \par \par Family History:\par \par Social History:\par Lives with:\par ETOH/tobacco use:\par \par DATA REVIEWED:\par \par Acid Fast - neg 4/18/2022\par Creatnine- 0.36 AST 53/ALT 61/ (9/28/21)\par  (9/28/21)\par \par PFTS 7/22/22\par \par FVC:\par \par FEV1:\par \par TLC:\par \par DLCO:\par \par \par \par 6MWT\par \par \par CT CHEST \par \par \par ECHO\par \par \par RHC/LHC\par \par

## 2022-07-22 ENCOUNTER — APPOINTMENT (OUTPATIENT)
Dept: PULMONOLOGY | Facility: CLINIC | Age: 67
End: 2022-07-22

## 2022-07-22 ENCOUNTER — NON-APPOINTMENT (OUTPATIENT)
Age: 67
End: 2022-07-22

## 2022-07-22 NOTE — ED PROVIDER NOTE - NS HIV RISK FACTOR YES
· Assessment	  78 year old lady with MDS, severe anemia and thrombocytopenia had treansfusion 3 days ago and presented with tarry stool and HB 4.  No abd pain but she has afib with RVR.    1. MDS  off treatment.  has severe anemia and thrombocytopenia  she had PRBC and platelet   she still has some bleeding in BM  Hb dropped to 6  will give 1 u PRBC    2 GIB  it happened recently  she had EGD and colonoscopy without significant finding  hopefully the bleeding can stop once platelet is up  keep platelet at least 50    3 Afib  it happened once recently also  not candidate for AC   Declined

## 2022-08-13 NOTE — ED PROVIDER NOTE - CONSTITUTIONAL, MLM
English Thin appearing, awake, alert, oriented to person, place, time/situation and mild distress. normal...

## 2022-08-16 ENCOUNTER — TRANSCRIPTION ENCOUNTER (OUTPATIENT)
Age: 67
End: 2022-08-16

## 2022-08-23 ENCOUNTER — APPOINTMENT (OUTPATIENT)
Dept: PULMONOLOGY | Facility: CLINIC | Age: 67
End: 2022-08-23

## 2022-08-23 VITALS
HEIGHT: 63 IN | BODY MASS INDEX: 16.83 KG/M2 | WEIGHT: 95 LBS | HEART RATE: 62 BPM | TEMPERATURE: 98 F | OXYGEN SATURATION: 96 % | SYSTOLIC BLOOD PRESSURE: 149 MMHG | DIASTOLIC BLOOD PRESSURE: 70 MMHG

## 2022-08-23 VITALS — HEIGHT: 63 IN | BODY MASS INDEX: 16.83 KG/M2 | RESPIRATION RATE: 16 BRPM

## 2022-08-23 DIAGNOSIS — K13.79 OTHER LESIONS OF ORAL MUCOSA: ICD-10-CM

## 2022-08-23 PROCEDURE — G0009: CPT

## 2022-08-23 PROCEDURE — 94726 PLETHYSMOGRAPHY LUNG VOLUMES: CPT

## 2022-08-23 PROCEDURE — 94010 BREATHING CAPACITY TEST: CPT

## 2022-08-23 PROCEDURE — 94618 PULMONARY STRESS TESTING: CPT

## 2022-08-23 PROCEDURE — ZZZZZ: CPT

## 2022-08-23 PROCEDURE — 90677 PCV20 VACCINE IM: CPT

## 2022-08-23 PROCEDURE — 99215 OFFICE O/P EST HI 40 MIN: CPT | Mod: 25

## 2022-08-23 PROCEDURE — 94729 DIFFUSING CAPACITY: CPT

## 2022-08-23 RX ORDER — EUCALYPTOL, MENTHOL, UNSPECIFIED FORM, METHYL SALICYLATE, AND THYMOL .92; .42; .6; .64 MG/ML; MG/ML; MG/ML; MG/ML
MOUTHWASH ORAL DAILY
Qty: 1 | Refills: 1 | Status: ACTIVE | COMMUNITY
Start: 2022-08-23 | End: 1900-01-01

## 2022-08-23 NOTE — ASSESSMENT
[FreeTextEntry1] : Patient is a 66 yo F w/ probable PCD (characterized by sinusitis, bronchiectasis and recurrent infections), recent COVID (1/2022, hospitalized x 2 weeks and also treated with Meropenem for Pseudomonas and ESBL Klebsiella in sputum), chronic hypoxemic (O2 2 to 4L nc) and hypercapnic respiratory failure (On AVAPS) who presentsfor followup.  She is compliant with her regimen according to daughter in law and appears well.  Reports having difficulty expectorating sputum.\par \par On exam today, VSS, oxygen saturation at rest at RA is 93 % and lungs are mostly clear.  There is no edema.  \par \par CF panel was negative\par \par #Chronic Respiratory failure 2/2 bronchiectasis. Previous cultures have grown Pseudomonas and Elizabethkingia meningospectica. She was referred for transplant evaluation but refused it.  \par -  c/w Albuterol, Hypersal, Aerobika TID\par - c/w Spiriva, Symbicort 160 BID, Singulair\par -- c/w supplemental O2 and AVAPS QHS\par - \par -#Dysphagia - Cinesphogram 2019 with evidence of laryngeal penetration but no aspiration. \par - Continue AVAPS nocturnally- has chronic hypercapnea and has benefitted from NIV nocturnally.  Elevation of CO2 can lead to recurrent hospitalizations, respiratory failure and death. \par #HCM\par -to receive Prevnar today.  has not received pneumococcal vaccination. \par - weight has been stable.   \par RTC 3 months\par \par \par

## 2022-08-23 NOTE — HISTORY OF PRESENT ILLNESS
[TextBox_4] : Leandro speaking, accompained Daughter in law Suki who is translating. \par \par Patient is a 66 yo F w/ probable PCD (characterized by sinusitis, bronchiectasis and recurrent infections), recent COVID (1/2022, hospitalized x 2 weeks and also treated with Meropenem for Pseudomonas and ESBL Klebsiella in sputum), chronic hypoxemic (O2 2 to 4L nc) and hypercapnic respiratory failure (On AVAPS) who presents to pulmonary clinic for follow up. She has had recurrent hospitalizations for exacerbations of bronchiectasis.  Has not been hospitalized since January this year.  \par \par Patient was last seen in the office on 5/24/22.  during which she was continued on her bronchodilators and ACT. She was also prescribed Cipro 500 BID x 7 days and advised to start using her inhaled Amikacin BID. \par \par Of note, patient is on Albuterol, Hypersal, Aerobika TID, and Spiriva, Symbicort 160 BID, Singulair. She is prescribed Amikacin but has not been using it. She is not using amikacin because it causes hoarnesess and she shakes.  She also does not have their prescribed mirtazipine. \par \par Previous cultures have grown Pseudomonas and Elizabethkingia meningospectica, both sensitive to Cipro\par \par Last TTE 6/2021 overall normal but unable to estimate PASP\par \par She performed PFTs today which show severe restriction and obstruction and DLCO is reduced at well.  She had difficulty performing the maneuvers. On exercise oximetry she desaturated to 88% when walking on RA.  ON 2 L NC oxygen saturation remained in the mid 90s.  \par \par Patient using her NIV at night and is receiving supplies.  Not taking mirtazapine.  However weight is stable over the past year.  \par Reports using clearance technique but sputum not coming up.  Denies fever.  \par Not able to tolerate amikacin.  She reports hoarseness following that.\par Daughter in  present today- confirms compliance with her regimen.  STates using the clearance techniques along with the vest and noninvasive ventilation at night.  States that she has tremendous anxiety at times which worsens the problem.

## 2022-08-26 LAB — BACTERIA SPT CULT: ABNORMAL

## 2022-08-30 ENCOUNTER — MED ADMIN CHARGE (OUTPATIENT)
Age: 67
End: 2022-08-30

## 2022-09-02 NOTE — DIETITIAN INITIAL EVALUATION ADULT. - REASON FOR ADMISSION
Outreach Attempt was made to schedule Overdue Care Gap.    The Outcome of the Outreach was Contact was made, care gap was discussed - see further documentation. Care Gaps discussed included Immunizations and Wellness Visits.   SOB

## 2022-09-27 ENCOUNTER — INPATIENT (INPATIENT)
Facility: HOSPITAL | Age: 67
LOS: 12 days | Discharge: HOME HEALTH | End: 2022-10-10
Attending: STUDENT IN AN ORGANIZED HEALTH CARE EDUCATION/TRAINING PROGRAM | Admitting: STUDENT IN AN ORGANIZED HEALTH CARE EDUCATION/TRAINING PROGRAM

## 2022-09-27 VITALS
DIASTOLIC BLOOD PRESSURE: 65 MMHG | RESPIRATION RATE: 15 BRPM | HEIGHT: 64 IN | TEMPERATURE: 98 F | HEART RATE: 81 BPM | OXYGEN SATURATION: 100 % | SYSTOLIC BLOOD PRESSURE: 150 MMHG

## 2022-09-27 DIAGNOSIS — Z90.49 ACQUIRED ABSENCE OF OTHER SPECIFIED PARTS OF DIGESTIVE TRACT: Chronic | ICD-10-CM

## 2022-09-27 DIAGNOSIS — Z98.890 OTHER SPECIFIED POSTPROCEDURAL STATES: Chronic | ICD-10-CM

## 2022-09-27 PROCEDURE — 99285 EMERGENCY DEPT VISIT HI MDM: CPT

## 2022-09-27 NOTE — ED ADULT TRIAGE NOTE - CHIEF COMPLAINT QUOTE
presents C/O SOB. x1 month. worsening today. No complaints of chest pain, headache, nausea, dizziness, vomiting  fever, chills verbalized. Pmhx. COPD DM GERD.

## 2022-09-28 DIAGNOSIS — R10.13 EPIGASTRIC PAIN: ICD-10-CM

## 2022-09-28 LAB
ALBUMIN SERPL ELPH-MCNC: 3.3 G/DL — SIGNIFICANT CHANGE UP (ref 3.3–5)
ALP SERPL-CCNC: 150 U/L — HIGH (ref 40–120)
ALT FLD-CCNC: 32 U/L — SIGNIFICANT CHANGE UP (ref 4–33)
ANION GAP SERPL CALC-SCNC: 10 MMOL/L — SIGNIFICANT CHANGE UP (ref 7–14)
APPEARANCE UR: CLEAR — SIGNIFICANT CHANGE UP
AST SERPL-CCNC: 31 U/L — SIGNIFICANT CHANGE UP (ref 4–32)
B PERT DNA SPEC QL NAA+PROBE: SIGNIFICANT CHANGE UP
B PERT+PARAPERT DNA PNL SPEC NAA+PROBE: SIGNIFICANT CHANGE UP
BACTERIA # UR AUTO: NEGATIVE — SIGNIFICANT CHANGE UP
BASE EXCESS BLDV CALC-SCNC: 8.4 MMOL/L — HIGH (ref -2–3)
BASOPHILS # BLD AUTO: 0.05 K/UL — SIGNIFICANT CHANGE UP (ref 0–0.2)
BASOPHILS NFR BLD AUTO: 0.5 % — SIGNIFICANT CHANGE UP (ref 0–2)
BILIRUB SERPL-MCNC: 0.2 MG/DL — SIGNIFICANT CHANGE UP (ref 0.2–1.2)
BILIRUB UR-MCNC: NEGATIVE — SIGNIFICANT CHANGE UP
BLOOD GAS VENOUS COMPREHENSIVE RESULT: SIGNIFICANT CHANGE UP
BORDETELLA PARAPERTUSSIS (RAPRVP): SIGNIFICANT CHANGE UP
BUN SERPL-MCNC: 13 MG/DL — SIGNIFICANT CHANGE UP (ref 7–23)
C PNEUM DNA SPEC QL NAA+PROBE: SIGNIFICANT CHANGE UP
CALCIUM SERPL-MCNC: 9.7 MG/DL — SIGNIFICANT CHANGE UP (ref 8.4–10.5)
CHLORIDE BLDV-SCNC: 95 MMOL/L — LOW (ref 96–108)
CHLORIDE SERPL-SCNC: 94 MMOL/L — LOW (ref 98–107)
CO2 BLDV-SCNC: 38.4 MMOL/L — HIGH (ref 22–26)
CO2 SERPL-SCNC: 29 MMOL/L — SIGNIFICANT CHANGE UP (ref 22–31)
COLOR SPEC: SIGNIFICANT CHANGE UP
CREAT SERPL-MCNC: 0.31 MG/DL — LOW (ref 0.5–1.3)
DIFF PNL FLD: NEGATIVE — SIGNIFICANT CHANGE UP
EGFR: 117 ML/MIN/1.73M2 — SIGNIFICANT CHANGE UP
EOSINOPHIL # BLD AUTO: 0.15 K/UL — SIGNIFICANT CHANGE UP (ref 0–0.5)
EOSINOPHIL NFR BLD AUTO: 1.6 % — SIGNIFICANT CHANGE UP (ref 0–6)
EPI CELLS # UR: 2 /HPF — SIGNIFICANT CHANGE UP (ref 0–5)
FLUAV SUBTYP SPEC NAA+PROBE: SIGNIFICANT CHANGE UP
FLUBV RNA SPEC QL NAA+PROBE: SIGNIFICANT CHANGE UP
GAS PNL BLDV: 131 MMOL/L — LOW (ref 136–145)
GLUCOSE BLDV-MCNC: 88 MG/DL — SIGNIFICANT CHANGE UP (ref 70–99)
GLUCOSE SERPL-MCNC: 94 MG/DL — SIGNIFICANT CHANGE UP (ref 70–99)
GLUCOSE UR QL: ABNORMAL
GRAM STN FLD: SIGNIFICANT CHANGE UP
HADV DNA SPEC QL NAA+PROBE: SIGNIFICANT CHANGE UP
HCO3 BLDV-SCNC: 36 MMOL/L — HIGH (ref 22–29)
HCOV 229E RNA SPEC QL NAA+PROBE: SIGNIFICANT CHANGE UP
HCOV HKU1 RNA SPEC QL NAA+PROBE: SIGNIFICANT CHANGE UP
HCOV NL63 RNA SPEC QL NAA+PROBE: SIGNIFICANT CHANGE UP
HCOV OC43 RNA SPEC QL NAA+PROBE: SIGNIFICANT CHANGE UP
HCT VFR BLD CALC: 41.3 % — SIGNIFICANT CHANGE UP (ref 34.5–45)
HCT VFR BLDA CALC: 39 % — SIGNIFICANT CHANGE UP (ref 34.5–46.5)
HGB BLD CALC-MCNC: 13.1 G/DL — SIGNIFICANT CHANGE UP (ref 11.5–15.5)
HGB BLD-MCNC: 13.1 G/DL — SIGNIFICANT CHANGE UP (ref 11.5–15.5)
HMPV RNA SPEC QL NAA+PROBE: SIGNIFICANT CHANGE UP
HPIV1 RNA SPEC QL NAA+PROBE: SIGNIFICANT CHANGE UP
HPIV2 RNA SPEC QL NAA+PROBE: SIGNIFICANT CHANGE UP
HPIV3 RNA SPEC QL NAA+PROBE: SIGNIFICANT CHANGE UP
HPIV4 RNA SPEC QL NAA+PROBE: SIGNIFICANT CHANGE UP
HYALINE CASTS # UR AUTO: 0 /LPF — SIGNIFICANT CHANGE UP (ref 0–7)
IANC: 5.31 K/UL — SIGNIFICANT CHANGE UP (ref 1.8–7.4)
IMM GRANULOCYTES NFR BLD AUTO: 0.7 % — SIGNIFICANT CHANGE UP (ref 0–0.9)
KETONES UR-MCNC: NEGATIVE — SIGNIFICANT CHANGE UP
LACTATE BLDV-MCNC: 0.8 MMOL/L — SIGNIFICANT CHANGE UP (ref 0.5–2)
LEUKOCYTE ESTERASE UR-ACNC: NEGATIVE — SIGNIFICANT CHANGE UP
LIDOCAIN IGE QN: 19 U/L — SIGNIFICANT CHANGE UP (ref 7–60)
LYMPHOCYTES # BLD AUTO: 2.66 K/UL — SIGNIFICANT CHANGE UP (ref 1–3.3)
LYMPHOCYTES # BLD AUTO: 27.7 % — SIGNIFICANT CHANGE UP (ref 13–44)
M PNEUMO DNA SPEC QL NAA+PROBE: SIGNIFICANT CHANGE UP
MCHC RBC-ENTMCNC: 27.1 PG — SIGNIFICANT CHANGE UP (ref 27–34)
MCHC RBC-ENTMCNC: 31.7 GM/DL — LOW (ref 32–36)
MCV RBC AUTO: 85.3 FL — SIGNIFICANT CHANGE UP (ref 80–100)
MONOCYTES # BLD AUTO: 1.37 K/UL — HIGH (ref 0–0.9)
MONOCYTES NFR BLD AUTO: 14.3 % — HIGH (ref 2–14)
NEUTROPHILS # BLD AUTO: 5.31 K/UL — SIGNIFICANT CHANGE UP (ref 1.8–7.4)
NEUTROPHILS NFR BLD AUTO: 55.2 % — SIGNIFICANT CHANGE UP (ref 43–77)
NITRITE UR-MCNC: POSITIVE
NRBC # BLD: 0 /100 WBCS — SIGNIFICANT CHANGE UP (ref 0–0)
NRBC # FLD: 0 K/UL — SIGNIFICANT CHANGE UP (ref 0–0)
NT-PROBNP SERPL-SCNC: 326 PG/ML — HIGH
PCO2 BLDV: 66 MMHG — HIGH (ref 39–42)
PH BLDV: 7.35 — SIGNIFICANT CHANGE UP (ref 7.32–7.43)
PH UR: 8 — SIGNIFICANT CHANGE UP (ref 5–8)
PLATELET # BLD AUTO: 178 K/UL — SIGNIFICANT CHANGE UP (ref 150–400)
PO2 BLDV: 58 MMHG — SIGNIFICANT CHANGE UP
POTASSIUM BLDV-SCNC: 3.8 MMOL/L — SIGNIFICANT CHANGE UP (ref 3.5–5.1)
POTASSIUM SERPL-MCNC: 4.2 MMOL/L — SIGNIFICANT CHANGE UP (ref 3.5–5.3)
POTASSIUM SERPL-SCNC: 4.2 MMOL/L — SIGNIFICANT CHANGE UP (ref 3.5–5.3)
PROT SERPL-MCNC: 6.7 G/DL — SIGNIFICANT CHANGE UP (ref 6–8.3)
PROT UR-MCNC: ABNORMAL
RAPID RVP RESULT: SIGNIFICANT CHANGE UP
RBC # BLD: 4.84 M/UL — SIGNIFICANT CHANGE UP (ref 3.8–5.2)
RBC # FLD: 12.8 % — SIGNIFICANT CHANGE UP (ref 10.3–14.5)
RBC CASTS # UR COMP ASSIST: 3 /HPF — SIGNIFICANT CHANGE UP (ref 0–4)
RSV RNA SPEC QL NAA+PROBE: SIGNIFICANT CHANGE UP
RV+EV RNA SPEC QL NAA+PROBE: SIGNIFICANT CHANGE UP
SAO2 % BLDV: 88.3 % — SIGNIFICANT CHANGE UP
SARS-COV-2 RNA SPEC QL NAA+PROBE: SIGNIFICANT CHANGE UP
SODIUM SERPL-SCNC: 133 MMOL/L — LOW (ref 135–145)
SP GR SPEC: 1.02 — SIGNIFICANT CHANGE UP (ref 1.01–1.05)
SPECIMEN SOURCE: SIGNIFICANT CHANGE UP
TROPONIN T, HIGH SENSITIVITY RESULT: 9 NG/L — SIGNIFICANT CHANGE UP
UROBILINOGEN FLD QL: SIGNIFICANT CHANGE UP
WBC # BLD: 9.61 K/UL — SIGNIFICANT CHANGE UP (ref 3.8–10.5)
WBC # FLD AUTO: 9.61 K/UL — SIGNIFICANT CHANGE UP (ref 3.8–10.5)
WBC UR QL: 1 /HPF — SIGNIFICANT CHANGE UP (ref 0–5)

## 2022-09-28 PROCEDURE — 76705 ECHO EXAM OF ABDOMEN: CPT | Mod: 26

## 2022-09-28 PROCEDURE — 71260 CT THORAX DX C+: CPT | Mod: 26,MA

## 2022-09-28 PROCEDURE — 99254 IP/OBS CNSLTJ NEW/EST MOD 60: CPT | Mod: GC

## 2022-09-28 PROCEDURE — 74177 CT ABD & PELVIS W/CONTRAST: CPT | Mod: 26,MA

## 2022-09-28 PROCEDURE — 99222 1ST HOSP IP/OBS MODERATE 55: CPT | Mod: GC

## 2022-09-28 PROCEDURE — 99223 1ST HOSP IP/OBS HIGH 75: CPT | Mod: GC

## 2022-09-28 RX ORDER — INSULIN LISPRO 100/ML
VIAL (ML) SUBCUTANEOUS
Refills: 0 | Status: DISCONTINUED | OUTPATIENT
Start: 2022-09-28 | End: 2022-09-28

## 2022-09-28 RX ORDER — TIOTROPIUM BROMIDE 18 UG/1
0 CAPSULE ORAL; RESPIRATORY (INHALATION)
Qty: 0 | Refills: 0 | DISCHARGE

## 2022-09-28 RX ORDER — INSULIN LISPRO 100/ML
VIAL (ML) SUBCUTANEOUS
Refills: 0 | Status: DISCONTINUED | OUTPATIENT
Start: 2022-09-28 | End: 2022-09-29

## 2022-09-28 RX ORDER — FAMOTIDINE 10 MG/ML
20 INJECTION INTRAVENOUS ONCE
Refills: 0 | Status: COMPLETED | OUTPATIENT
Start: 2022-09-28 | End: 2022-09-28

## 2022-09-28 RX ORDER — MONTELUKAST 4 MG/1
1 TABLET, CHEWABLE ORAL
Qty: 0 | Refills: 0 | DISCHARGE

## 2022-09-28 RX ORDER — LIDOCAINE AND PRILOCAINE CREAM 25; 25 MG/G; MG/G
1 CREAM TOPICAL DAILY
Refills: 0 | Status: DISCONTINUED | OUTPATIENT
Start: 2022-09-28 | End: 2022-10-10

## 2022-09-28 RX ORDER — IPRATROPIUM/ALBUTEROL SULFATE 18-103MCG
3 AEROSOL WITH ADAPTER (GRAM) INHALATION ONCE
Refills: 0 | Status: COMPLETED | OUTPATIENT
Start: 2022-09-28 | End: 2022-09-28

## 2022-09-28 RX ORDER — DEXTROSE 50 % IN WATER 50 %
25 SYRINGE (ML) INTRAVENOUS ONCE
Refills: 0 | Status: DISCONTINUED | OUTPATIENT
Start: 2022-09-28 | End: 2022-09-28

## 2022-09-28 RX ORDER — SENNOSIDES/DOCUSATE SODIUM 8.6MG-50MG
0 TABLET ORAL
Qty: 0 | Refills: 0 | DISCHARGE

## 2022-09-28 RX ORDER — SODIUM CHLORIDE 9 MG/ML
1000 INJECTION, SOLUTION INTRAVENOUS
Refills: 0 | Status: DISCONTINUED | OUTPATIENT
Start: 2022-09-28 | End: 2022-09-28

## 2022-09-28 RX ORDER — LOSARTAN POTASSIUM 100 MG/1
0 TABLET, FILM COATED ORAL
Qty: 0 | Refills: 2 | DISCHARGE

## 2022-09-28 RX ORDER — SENNA PLUS 8.6 MG/1
2 TABLET ORAL AT BEDTIME
Refills: 0 | Status: DISCONTINUED | OUTPATIENT
Start: 2022-09-28 | End: 2022-09-30

## 2022-09-28 RX ORDER — ENOXAPARIN SODIUM 100 MG/ML
30 INJECTION SUBCUTANEOUS EVERY 24 HOURS
Refills: 0 | Status: DISCONTINUED | OUTPATIENT
Start: 2022-09-28 | End: 2022-10-10

## 2022-09-28 RX ORDER — FAMOTIDINE 10 MG/ML
0 INJECTION INTRAVENOUS
Qty: 0 | Refills: 0 | DISCHARGE

## 2022-09-28 RX ORDER — PANTOPRAZOLE SODIUM 20 MG/1
40 TABLET, DELAYED RELEASE ORAL ONCE
Refills: 0 | Status: COMPLETED | OUTPATIENT
Start: 2022-09-28 | End: 2022-09-28

## 2022-09-28 RX ORDER — DEXTROSE 50 % IN WATER 50 %
15 SYRINGE (ML) INTRAVENOUS ONCE
Refills: 0 | Status: DISCONTINUED | OUTPATIENT
Start: 2022-09-28 | End: 2022-09-28

## 2022-09-28 RX ORDER — INSULIN LISPRO 100/ML
7 VIAL (ML) SUBCUTANEOUS
Refills: 0 | Status: DISCONTINUED | OUTPATIENT
Start: 2022-09-28 | End: 2022-09-29

## 2022-09-28 RX ORDER — NYSTATIN 500MM UNIT
500000 POWDER (EA) MISCELLANEOUS DAILY
Refills: 0 | Status: DISCONTINUED | OUTPATIENT
Start: 2022-09-28 | End: 2022-10-10

## 2022-09-28 RX ORDER — NYSTATIN 500MM UNIT
0 POWDER (EA) MISCELLANEOUS
Qty: 0 | Refills: 4 | DISCHARGE

## 2022-09-28 RX ORDER — AMLODIPINE BESYLATE 2.5 MG/1
5 TABLET ORAL DAILY
Refills: 0 | Status: DISCONTINUED | OUTPATIENT
Start: 2022-09-28 | End: 2022-10-10

## 2022-09-28 RX ORDER — FERROUS SULFATE 325(65) MG
325 TABLET ORAL DAILY
Refills: 0 | Status: DISCONTINUED | OUTPATIENT
Start: 2022-09-28 | End: 2022-10-10

## 2022-09-28 RX ORDER — ACETAMINOPHEN 500 MG
500 TABLET ORAL ONCE
Refills: 0 | Status: COMPLETED | OUTPATIENT
Start: 2022-09-28 | End: 2022-09-28

## 2022-09-28 RX ORDER — METOPROLOL TARTRATE 50 MG
0 TABLET ORAL
Qty: 0 | Refills: 2 | DISCHARGE

## 2022-09-28 RX ORDER — DEXTROSE 50 % IN WATER 50 %
25 SYRINGE (ML) INTRAVENOUS ONCE
Refills: 0 | Status: DISCONTINUED | OUTPATIENT
Start: 2022-09-28 | End: 2022-10-10

## 2022-09-28 RX ORDER — METOPROLOL TARTRATE 50 MG
50 TABLET ORAL DAILY
Refills: 0 | Status: DISCONTINUED | OUTPATIENT
Start: 2022-09-28 | End: 2022-10-10

## 2022-09-28 RX ORDER — PANTOPRAZOLE SODIUM 20 MG/1
40 TABLET, DELAYED RELEASE ORAL
Refills: 0 | Status: DISCONTINUED | OUTPATIENT
Start: 2022-09-28 | End: 2022-10-10

## 2022-09-28 RX ORDER — PIPERACILLIN AND TAZOBACTAM 4; .5 G/20ML; G/20ML
3.38 INJECTION, POWDER, LYOPHILIZED, FOR SOLUTION INTRAVENOUS ONCE
Refills: 0 | Status: DISCONTINUED | OUTPATIENT
Start: 2022-09-28 | End: 2022-09-29

## 2022-09-28 RX ORDER — FERROUS GLUCONATE 100 %
1 POWDER (GRAM) MISCELLANEOUS
Qty: 0 | Refills: 0 | DISCHARGE

## 2022-09-28 RX ORDER — VENLAFAXINE HCL 75 MG
37.5 CAPSULE, EXT RELEASE 24 HR ORAL DAILY
Refills: 0 | Status: DISCONTINUED | OUTPATIENT
Start: 2022-09-28 | End: 2022-10-10

## 2022-09-28 RX ORDER — SODIUM CHLORIDE 9 MG/ML
1000 INJECTION, SOLUTION INTRAVENOUS
Refills: 0 | Status: DISCONTINUED | OUTPATIENT
Start: 2022-09-28 | End: 2022-10-10

## 2022-09-28 RX ORDER — MIRTAZAPINE 45 MG/1
0 TABLET, ORALLY DISINTEGRATING ORAL
Qty: 0 | Refills: 0 | DISCHARGE

## 2022-09-28 RX ORDER — DEXTROSE 50 % IN WATER 50 %
12.5 SYRINGE (ML) INTRAVENOUS ONCE
Refills: 0 | Status: DISCONTINUED | OUTPATIENT
Start: 2022-09-28 | End: 2022-10-10

## 2022-09-28 RX ORDER — GLUCAGON INJECTION, SOLUTION 0.5 MG/.1ML
1 INJECTION, SOLUTION SUBCUTANEOUS ONCE
Refills: 0 | Status: DISCONTINUED | OUTPATIENT
Start: 2022-09-28 | End: 2022-09-28

## 2022-09-28 RX ORDER — INSULIN LISPRO 100/ML
7 VIAL (ML) SUBCUTANEOUS
Refills: 0 | Status: DISCONTINUED | OUTPATIENT
Start: 2022-09-28 | End: 2022-09-28

## 2022-09-28 RX ORDER — HYDROCORTISONE 1 %
1 OINTMENT (GRAM) TOPICAL
Refills: 0 | Status: DISCONTINUED | OUTPATIENT
Start: 2022-09-28 | End: 2022-09-28

## 2022-09-28 RX ORDER — INSULIN LISPRO 100/ML
VIAL (ML) SUBCUTANEOUS AT BEDTIME
Refills: 0 | Status: DISCONTINUED | OUTPATIENT
Start: 2022-09-28 | End: 2022-09-28

## 2022-09-28 RX ORDER — GLUCAGON INJECTION, SOLUTION 0.5 MG/.1ML
1 INJECTION, SOLUTION SUBCUTANEOUS ONCE
Refills: 0 | Status: DISCONTINUED | OUTPATIENT
Start: 2022-09-28 | End: 2022-10-10

## 2022-09-28 RX ORDER — OMEPRAZOLE 10 MG/1
1 CAPSULE, DELAYED RELEASE ORAL
Qty: 0 | Refills: 0 | DISCHARGE

## 2022-09-28 RX ORDER — PIPERACILLIN AND TAZOBACTAM 4; .5 G/20ML; G/20ML
3.38 INJECTION, POWDER, LYOPHILIZED, FOR SOLUTION INTRAVENOUS EVERY 8 HOURS
Refills: 0 | Status: DISCONTINUED | OUTPATIENT
Start: 2022-09-29 | End: 2022-09-29

## 2022-09-28 RX ORDER — INSULIN LISPRO 100/ML
VIAL (ML) SUBCUTANEOUS AT BEDTIME
Refills: 0 | Status: DISCONTINUED | OUTPATIENT
Start: 2022-09-28 | End: 2022-09-29

## 2022-09-28 RX ORDER — MONTELUKAST 4 MG/1
10 TABLET, CHEWABLE ORAL DAILY
Refills: 0 | Status: DISCONTINUED | OUTPATIENT
Start: 2022-09-28 | End: 2022-10-10

## 2022-09-28 RX ORDER — TIOTROPIUM BROMIDE 18 UG/1
1 CAPSULE ORAL; RESPIRATORY (INHALATION) DAILY
Refills: 0 | Status: DISCONTINUED | OUTPATIENT
Start: 2022-09-28 | End: 2022-10-10

## 2022-09-28 RX ORDER — ACETAMINOPHEN 500 MG
500 TABLET ORAL EVERY 6 HOURS
Refills: 0 | Status: DISCONTINUED | OUTPATIENT
Start: 2022-09-28 | End: 2022-10-10

## 2022-09-28 RX ORDER — SODIUM CHLORIDE 9 MG/ML
1000 INJECTION INTRAMUSCULAR; INTRAVENOUS; SUBCUTANEOUS ONCE
Refills: 0 | Status: COMPLETED | OUTPATIENT
Start: 2022-09-28 | End: 2022-09-28

## 2022-09-28 RX ORDER — KETOROLAC TROMETHAMINE 30 MG/ML
15 SYRINGE (ML) INJECTION EVERY 8 HOURS
Refills: 0 | Status: DISCONTINUED | OUTPATIENT
Start: 2022-09-28 | End: 2022-09-28

## 2022-09-28 RX ORDER — INSULIN GLARGINE 100 [IU]/ML
12 INJECTION, SOLUTION SUBCUTANEOUS AT BEDTIME
Refills: 0 | Status: DISCONTINUED | OUTPATIENT
Start: 2022-09-28 | End: 2022-09-29

## 2022-09-28 RX ORDER — DEXTROSE 50 % IN WATER 50 %
15 SYRINGE (ML) INTRAVENOUS ONCE
Refills: 0 | Status: DISCONTINUED | OUTPATIENT
Start: 2022-09-28 | End: 2022-10-10

## 2022-09-28 RX ORDER — PIPERACILLIN AND TAZOBACTAM 4; .5 G/20ML; G/20ML
3.38 INJECTION, POWDER, LYOPHILIZED, FOR SOLUTION INTRAVENOUS ONCE
Refills: 0 | Status: COMPLETED | OUTPATIENT
Start: 2022-09-28 | End: 2022-09-28

## 2022-09-28 RX ORDER — FLUTICASONE PROPIONATE 50 MCG
1 SPRAY, SUSPENSION NASAL
Refills: 0 | Status: DISCONTINUED | OUTPATIENT
Start: 2022-09-28 | End: 2022-10-10

## 2022-09-28 RX ORDER — BUDESONIDE AND FORMOTEROL FUMARATE DIHYDRATE 160; 4.5 UG/1; UG/1
2 AEROSOL RESPIRATORY (INHALATION)
Refills: 0 | Status: DISCONTINUED | OUTPATIENT
Start: 2022-09-28 | End: 2022-10-10

## 2022-09-28 RX ORDER — ALBUTEROL 90 UG/1
2 AEROSOL, METERED ORAL EVERY 6 HOURS
Refills: 0 | Status: DISCONTINUED | OUTPATIENT
Start: 2022-09-28 | End: 2022-10-10

## 2022-09-28 RX ORDER — DEXTROSE 50 % IN WATER 50 %
12.5 SYRINGE (ML) INTRAVENOUS ONCE
Refills: 0 | Status: DISCONTINUED | OUTPATIENT
Start: 2022-09-28 | End: 2022-09-28

## 2022-09-28 RX ORDER — MORPHINE SULFATE 50 MG/1
4 CAPSULE, EXTENDED RELEASE ORAL ONCE
Refills: 0 | Status: DISCONTINUED | OUTPATIENT
Start: 2022-09-28 | End: 2022-09-28

## 2022-09-28 RX ORDER — BUDESONIDE AND FORMOTEROL FUMARATE DIHYDRATE 160; 4.5 UG/1; UG/1
2 AEROSOL RESPIRATORY (INHALATION)
Qty: 0 | Refills: 0 | DISCHARGE

## 2022-09-28 RX ADMIN — INSULIN GLARGINE 12 UNIT(S): 100 INJECTION, SOLUTION SUBCUTANEOUS at 21:22

## 2022-09-28 RX ADMIN — FAMOTIDINE 20 MILLIGRAM(S): 10 INJECTION INTRAVENOUS at 01:58

## 2022-09-28 RX ADMIN — PIPERACILLIN AND TAZOBACTAM 200 GRAM(S): 4; .5 INJECTION, POWDER, LYOPHILIZED, FOR SOLUTION INTRAVENOUS at 20:37

## 2022-09-28 RX ADMIN — Medication 3 MILLILITER(S): at 01:59

## 2022-09-28 RX ADMIN — SENNA PLUS 2 TABLET(S): 8.6 TABLET ORAL at 21:23

## 2022-09-28 RX ADMIN — Medication 200 MILLIGRAM(S): at 17:45

## 2022-09-28 RX ADMIN — MONTELUKAST 10 MILLIGRAM(S): 4 TABLET, CHEWABLE ORAL at 17:45

## 2022-09-28 RX ADMIN — PANTOPRAZOLE SODIUM 40 MILLIGRAM(S): 20 TABLET, DELAYED RELEASE ORAL at 01:58

## 2022-09-28 RX ADMIN — Medication 37.5 MILLIGRAM(S): at 18:52

## 2022-09-28 RX ADMIN — BUDESONIDE AND FORMOTEROL FUMARATE DIHYDRATE 2 PUFF(S): 160; 4.5 AEROSOL RESPIRATORY (INHALATION) at 21:23

## 2022-09-28 RX ADMIN — Medication 4: at 21:25

## 2022-09-28 RX ADMIN — Medication 50 MILLIGRAM(S): at 17:45

## 2022-09-28 RX ADMIN — Medication 500 MILLIGRAM(S): at 23:47

## 2022-09-28 RX ADMIN — SODIUM CHLORIDE 500 MILLILITER(S): 9 INJECTION INTRAMUSCULAR; INTRAVENOUS; SUBCUTANEOUS at 01:59

## 2022-09-28 RX ADMIN — Medication 30 MILLILITER(S): at 01:59

## 2022-09-28 NOTE — PATIENT PROFILE ADULT - FALL HARM RISK - HARM RISK INTERVENTIONS

## 2022-09-28 NOTE — CONSULT NOTE ADULT - ATTENDING SUPERVISION STATEMENT
LAURA BARAJAS  47y Female    Patient is a 47y old  Female who presents with a chief complaint of abdominal pain    INTERVAL HPI/OVERNIGHT EVENTS:    ROS: Pt states that her abdominal pain is not controlled and unable to tolerate any po this morning. Pt denies fever, chills, SOB, palpitations, chest pain, dysuria, diarrhea, constipation, rash, muscle or joint pain.    Overnight events: No acute events overnight.    Vital Signs Last 24 Hrs  T(C): 37.6 (25 Aug 2022 15:50), Max: 37.8 (24 Aug 2022 23:33)  T(F): 99.6 (25 Aug 2022 15:50), Max: 100.1 (24 Aug 2022 23:33)  HR: 60 (25 Aug 2022 15:50) (60 - 96)  BP: 120/60 (25 Aug 2022 15:50) (113/56 - 143/95)  BP(mean): --  RR: 18 (25 Aug 2022 15:50) (18 - 18)  SpO2: 99% (24 Aug 2022 23:33) (97% - 99%)    Parameters below as of 24 Aug 2022 23:33  Patient On (Oxygen Delivery Method): room air    Compazine (Unknown)    MEDICATIONS  (STANDING):  dextrose 5% + sodium chloride 0.9% 1000 milliLiter(s) (200 mL/Hr) IV Continuous <Continuous>  enoxaparin Injectable 40 milliGRAM(s) SubCutaneous every 24 hours  folic acid 1 milliGRAM(s) Oral daily  gabapentin 300 milliGRAM(s) Oral three times a day  lactobacillus acidophilus 1 Tablet(s) Oral daily  magnesium sulfate  IVPB 2 Gram(s) IV Intermittent every 4 hours  multivitamin 1 Tablet(s) Oral daily  pancrelipase  (CREON 12,000 Lipase Units) 3 Capsule(s) Oral three times a day with meals  pantoprazole    Tablet 40 milliGRAM(s) Oral before breakfast  thiamine 100 milliGRAM(s) Oral daily    MEDICATIONS  (PRN):  HYDROmorphone  Injectable 0.5 milliGRAM(s) IV Push every 4 hours PRN Moderate Pain (4 - 6)  HYDROmorphone  Injectable 1 milliGRAM(s) IV Push every 4 hours PRN Severe Pain (7 - 10)  LORazepam   Injectable 2 milliGRAM(s) IV Push every 1 hour PRN Symptom-triggered: each CIWA -Ar score 8 or GREATER  ondansetron Injectable 8 milliGRAM(s) IV Push every 12 hours PRN Nausea and/or Vomiting  polyethylene glycol 3350 17 Gram(s) Oral daily PRN Constipation  senna 2 Tablet(s) Oral at bedtime PRN Constipation    PHYSICAL EXAM:  General Appearance: NAD, normal for age and gender. 	  Neck: Normal JVP, no bruit.   Eyes: Conjunctiva clear, Extra Ocular muscles intact. No scleral icterus.  ENMT: Moist oral mucosa. No oral lesion.  Cardiovascular: Regular rate and rhythm S1 S2, No JVD, No murmurs.  Respiratory: Lungs clear to auscultation. No wheezes, rales or rhonchi.  Psychiatry: Alert and oriented x 3, Mood & affect appropriate.  Gastrointestinal:  Soft, Non-distended. Tender.  Neurologic: Non-focal deficits.  Musculoskeletal/ extremities: Normal range of motion, No clubbing, cyanosis or edema.  Vascular: Peripheral pulses palpable bilaterally.  Skin/Integumen: No rashes, No ecchymoses, No cyanosis.    LABS:                        11.1   7.47  )-----------( 126      ( 25 Aug 2022 08:54 )             32.9     08-25    140  |  104  |  5<L>  ----------------------------<  72  3.5   |  24  |  0.6<L>    Ca    7.9<L>      25 Aug 2022 08:54  Phos  2.4     08-24  Mg     1.1     08-25    TPro  6.4  /  Alb  3.4<L>  /  TBili  0.7  /  DBili  x   /  AST  95<H>  /  ALT  29  /  AlkPhos  100  08-25      RADIOLOGY & ADDITIONAL TESTS:  < from: CT Abdomen and Pelvis w/ IV Cont (08.24.22 @ 12:31) >  IMPRESSION:  1.  Findings compatible with acute on chronic pancreatitis; no evidence   of hepatic necrosis or peripancreatic fluid collections.  2.  Hepatic steatosis.  3.  Small volume abdominopelvic ascites, likely reactive.                    
Resident
Fellow
Resident

## 2022-09-28 NOTE — H&P ADULT - NSHPREVIEWOFSYSTEMS_GEN_ALL_CORE
History limited by pain and patient declining further interview.    Constitutional: no fevers. +chills sometimes, +weight loss  HEENT: +vision deficit intermittently when blood glucose high, denied hearing changes but also endorsed it may be an issue from time to time  Cardiac: +chest pain ("at times"), + palpitations ("most times"), +feet swelling at times if not elevated, no swelling currently  Respiratory: +productive cough, +shortness of breath  GI: +nausea sometimes, no vomiting, no bloody or black stools, +constipation  : +burning with urination; no hematuria  Neuro: numbness in feet and fingertips, no tingling History limited by pain and patient declining further interview.    Constitutional: no fevers. +chills sometimes, +weight loss  HEENT: +vision deficit intermittently when blood glucose high, denied hearing changes but also endorsed it may be an issue from time to time  Cardiac: +chest pain ("at times"), +palpitations ("most times"), +feet swelling at times if not elevated, no swelling currently  Respiratory: +productive cough, +shortness of breath  GI: +nausea sometimes, no vomiting, no bloody or black stools, +constipation  : +burning with urination; no hematuria  Neuro: numbness in feet and fingertips, no tingling

## 2022-09-28 NOTE — ED ADULT NURSE REASSESSMENT NOTE - NS ED NURSE REASSESS COMMENT FT1
Pt received A&O x 4 with son at bedside. 20G IV inserted to R arm. Labs collected and sent. Meds administered as ordered. Stretcher in lowest position, wheels locked, appropriate side rails in place, call bell in reach.

## 2022-09-28 NOTE — H&P ADULT - NSICDXFAMILYHX_GEN_ALL_CORE_FT
FAMILY HISTORY:  Family history of bronchitis, mother, father  Family history of diabetes mellitus, father    Child  Still living? Unknown  Family history of allergic rhinitis, Age at diagnosis: Age Unknown  Family history of allergic rhinitis, Age at diagnosis: Age Unknown    
for greater than two weeks/no

## 2022-09-28 NOTE — CONSULT NOTE ADULT - ASSESSMENT
68yo F with PMH of constipation, ciliary dyskinesia c/b bronchiectasis/COPD (on 2-3L home oxygen), several prior admissions due to COPD exacerbations/pneumonia episodes, PSH cholecystectomy (1998, c/b CBD strictures managed with PTC/PTBD and stent placement that was removed), liver cirrhosis on imaging who presents with worsening SOB for 2 weeks. GI consulted for choledocholithiasis.    # Choledocholithiasis - appears to be unchanged based on review of previous imaging. Bilirubin normal, low suspicion for obstruction. Patient will benefit from non-urgent ERCP, which should be deferred until patient is optimized from a respiratory standpoint. Would plan for outpatient follow up and outpatient ECRP.  # COPD - worsening respiratory distress    Recommendations:  - Outpatient GI followup for possible ERCP  - No further inpatient workup planned  - GI will sign off    Please note that the recommendations are not final until attested by an attending.    Thank you for involving us in the care of this patient. Please reach out if any further questions.    Arnel Tsai, PGY-6  Gastroenterology/Hepatology Fellow    Available on [employer] Teams  After 5PM/Weekends, please contact the on-call GI fellow: [phone]

## 2022-09-28 NOTE — ED ADULT NURSE NOTE - OBJECTIVE STATEMENT
received to room 6. complains of shortness of breath, abdominal discomfort, cough and blood sugar fluctuation. denies any chest pain. on 3 L NC. awaiting md amador in n ad with family at bedside

## 2022-09-28 NOTE — ED PROVIDER NOTE - OBJECTIVE STATEMENT
Son at bedside translating. 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 Patient notes SOB has worsened over past 20 years. Follows actively with pulm here. Has developed intermittent epigastric pain x2 weeks. No nausea, vomiting or diarrhea. Has also been constipated, states she has history of hemorrhoids. Denies dark or bloody stools, fevers, cough, CP, hematuria, dysuria. Son at bedside translating. 67 F with PMH constipation, CF c/b bronchiectasis (on 2-3L home oxygen), esophageal strictures and GERD, PSH cholecystectomy presenting with SOB and abdominal pain. Patient notes SOB has worsened over past 20 years. Follows actively with pulm here. Has developed intermittent epigastric pain x2 weeks. No nausea, vomiting or diarrhea. Has also been constipated, states she has history of hemorrhoids. Denies dark or bloody stools, fevers, cough, CP, hematuria, dysuria.

## 2022-09-28 NOTE — CONSULT NOTE ADULT - ASSESSMENT
67 F with PMH constipation, ciliary dyskinesia c/b bronchiectasis/COPD (on 2-3L home oxygen), several prior admissions due to COPD exacerbations/pneumonia episodes, PSH cholecystectomy (1998, c/b CBD strictures managed with PTC/PTBD and stent placement that was removed per patient/family presenting with SOB and epigastric abdominal pain for two weeks. Surgery consulted for evaluation and management of choledocholithiasis based on CT findings of punctate nonobstructing choledocholithiasis in the distal common duct, new from the prior study. Tbili 0.2, WBC 9.6.    Recommendations  - No acute surgical intervention indicated  - Recommend admission to Medicine for further workup and management of COPD exacerbation  - Recommend GI consult  - MRCP to further delineate bile duct anatomy  - Rest of care per primary team    B team Surgery  [id number]  67 F with PMH constipation, ciliary dyskinesia c/b bronchiectasis/COPD (on 2-3L home oxygen), several prior admissions due to COPD exacerbations/pneumonia episodes, PSH cholecystectomy (1998, c/b CBD strictures managed with PTC/PTBD and stent placement that was removed per patient/family presenting with SOB and epigastric abdominal pain for two weeks. Surgery consulted for evaluation and management of choledocholithiasis based on CT findings of punctate nonobstructing choledocholithiasis in the distal common duct, new from the prior study. Tbili 0.2, WBC 9.6.    Recommendations  - No acute surgical intervention indicated  - Recommend admission to Medicine for further workup and management of COPD exacerbation  - Recommend GI consult  - MRCP to further delineate bile duct anatomy  - Rest of care per primary team  - Plan discussed and agreeable with Surgery Attending Dr. Jacques RICE team Surgery  [id number]

## 2022-09-28 NOTE — H&P ADULT - NSHPPHYSICALEXAM_GEN_ALL_CORE
During interview, /84, satting %    General: Reclining in bed, looking uncomfortable. Ill-appearing, thin.  HEENT: Normocephalic, atraumatic. Extraocular movements grossly intact. No nasal discharge.  Neuro: Alert and oriented*3. No facial asymmetry or dysarthria. Moving all extremities.  CV: Regular rate and rhythm. S1/S2. No murmurs, rubs, or gallops appreciated.  Respiratory: Diffuse crackles and end expiratory wheezes throughout lung fields, possibly worse on the left. Increased work of breathing. Often short sentences, capable of long sentences. Coughing up white sputum.  Abdomen: Soft; tender to light palpation, all quadrants; nondistended. No rebound or guarding.  : Suprapubic region nontender. Clear yellow urine in Rosenberg catheter/bag.  Skin: No rashes or bruising of face, forearms, or calves bilaterally. Old abdominal incisional scar. Poor skin turgor.  Psych: Mood and affect appropriate. During interview, /84, satting %    General: Reclining in bed, looking uncomfortable. Ill-appearing, thin.  HEENT: Normocephalic, atraumatic. Extraocular movements grossly intact. No nasal discharge.  Neuro: Alert and oriented*3. No facial asymmetry or dysarthria. Moving all extremities.  CV: Regular rate and rhythm. S1/S2. No murmurs, rubs, or gallops appreciated.  Respiratory: Diffuse crackles and end expiratory wheezes throughout lung fields, possibly worse on the left. Increased work of breathing. Often short sentences, capable of long sentences. Coughing up white sputum.  Abdomen: Soft; tender to light palpation, all quadrants; nondistended. No rebound or guarding.  : Suprapubic region tender. Clear yellow urine in Rosenberg catheter/bag.  Skin: No rashes or bruising of face, forearms, or calves bilaterally. Old abdominal incisional scar. Poor skin turgor.  Psych: Mood and affect appropriate.

## 2022-09-28 NOTE — H&P ADULT - NSHPLABSRESULTS_GEN_ALL_CORE
.  LABS:                         13.1   9.61  )-----------( 178      ( 28 Sep 2022 02:13 )             41.3         133<L>  |  94<L>  |  13  ----------------------------<  94  4.2   |  29  |  0.31<L>    Ca    9.7      28 Sep 2022 02:13    TPro  6.7  /  Alb  3.3  /  TBili  0.2  /  DBili  x   /  AST  31  /  ALT  32  /  AlkPhos  150<H>        Urinalysis Basic - ( 28 Sep 2022 07:59 )    Color: Light Yellow / Appearance: Clear / S.024 / pH: x  Gluc: x / Ketone: Negative  / Bili: Negative / Urobili: <2 mg/dL   Blood: x / Protein: Trace / Nitrite: Positive   Leuk Esterase: Negative / RBC: 3 /HPF / WBC 1 /HPF   Sq Epi: x / Non Sq Epi: 2 /HPF / Bacteria: Negative      CARDIAC MARKERS ( 28 Sep 2022 02:13 )  x     / x     / 43 U/L / x     / 1.9 ng/mL    RADIOLOGY Reviewed. .  LABS:                         13.1   9.61  )-----------( 178      ( 28 Sep 2022 02:13 )             41.3         133<L>  |  94<L>  |  13  ----------------------------<  94  4.2   |  29  |  0.31<L>    Ca    9.7      28 Sep 2022 02:13    TPro  6.7  /  Alb  3.3  /  TBili  0.2  /  DBili  x   /  AST  31  /  ALT  32  /  AlkPhos  150<H>        Urinalysis Basic - ( 28 Sep 2022 07:59 )    Color: Light Yellow / Appearance: Clear / S.024 / pH: x  Gluc: x / Ketone: Negative  / Bili: Negative / Urobili: <2 mg/dL   Blood: x / Protein: Trace / Nitrite: Positive   Leuk Esterase: Negative / RBC: 3 /HPF / WBC 1 /HPF   Sq Epi: x / Non Sq Epi: 2 /HPF / Bacteria: Negative      CARDIAC MARKERS ( 28 Sep 2022 02:13 )  x     / x     / 43 U/L / x     / 1.9 ng/mL    RADIOLOGY Reviewed: CT Chest/Abd/Pelvis, US

## 2022-09-28 NOTE — H&P ADULT - ASSESSMENT
67F with PMH cystic fibrosis (CF) versus primary ciliary dyskinesia (PCD), on home O2 at 2-3L NC, ABPA, allergic rhinitis, recurrent admissions for pneumonia; constipation; GERD, T2DM on insulin, anxiety, depression, cirrhosis, PSH remote cholecystectomy c/b strictures with remote PTC/PTBD/stent, presenting with 2-3 weeks of diffuse abdominal pain worst in epigastric region. Pain likely with contributions of choledocholithiasis as well as chronic constipation 2/2 CF/PCD.     # abdominal pain  Patient seemed to be taking omeprazole for minimal abdominal pain relief but might benefit from acetaminophen, possibly avoid opiates to avoid biliary colic, possibly avoid NSAID to avoid mucosal irritation/bleed (hx GERD)  - Surgery recommended MRCP vs ERCP with GI  - GI recommending outpatient ERCP  - start acetaminophen 500 po q6h standing  - water enema  - Miralax, senna, bisacodyl po     # GERD  - pantoprazole, Maalox, home Pepcid     # early satiety     # weight loss - likely due to poor intake for pain avoidance though malignancy possible  - consider mirtazipine after further med rec    # shortness of breath  - resolved, at baseline      # increased mucus production  - per pulm recs:   Continue home inhalers - albuterol, spiriva, symbicort  C/w home airway clearance regimen: hypersal, aerobica  ID Consult for abx co-management - will likely need inhaled +/- systemic abx  c/w home O2   c/w NIPPV qhs at her home settings. 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 Pain likely with contributions of choledocholithiasis as well as chronic constipation 2/2 CF/PCD.     # abdominal pain  Patient seemed to be taking omeprazole for minimal abdominal pain relief but might benefit from acetaminophen, possibly avoid opiates to avoid biliary colic, possibly avoid NSAID to avoid mucosal irritation/bleed (hx GERD)  - Surgery recommended MRCP vs ERCP with GI  - GI recommending outpatient ERCP  - start acetaminophen 500 po q6h standing  - water enema  - Miralax, senna, bisacodyl po     # GERD  - pantoprazole, Maalox, home Pepcid     # early satiety     # weight loss - likely due to poor intake for pain avoidance though malignancy possible  - consider mirtazapine after further med rec    # shortness of breath  - resolved, at baseline      # increased mucus production  - per pulm recs:   Continue home inhalers - albuterol, spiriva, symbicort  C/w home airway clearance regimen: hypersal, aerobica  ID Consult for abx co-management - will likely need inhaled +/- systemic abx  c/w home O2   c/w NIPPV qhs at her home settings. 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 Pain likely with contributions of choledocholithiasis as well as chronic constipation 2/2 CF/PCD.     # abdominal pain  Patient seemed to be taking omeprazole for minimal abdominal pain relief but might benefit from acetaminophen, possibly avoid opiates to avoid biliary colic, possibly avoid NSAID to avoid mucosal irritation/bleed (hx GERD)  - Surgery recommended MRCP vs ERCP with GI  - GI recommending outpatient ERCP  - order MRCP  - start acetaminophen 500 po q6h standing  - Toradol 15 q8 PRN for pain  - water enema  - Miralax, senna, bisacodyl po     # GERD  - pantoprazole, Maalox, home Pepcid     # early satiety - possibly 2/2 bloating/constipation, though malignancy possible     # weight loss - likely due to poor intake for pain avoidance, though malignancy possible  - consider mirtazapine after further med rec  - ask about cancer screening history    # shortness of breath  - resolved, at baseline       # bronchiectasis exacerbation likely 2/2 infection  - Hx Pseudomonas aeruginosa colonization  - per pulm recs:   Continue home inhalers - albuterol, spiriva, symbicort  C/w home airway clearance regimen: hypersal, aerobica  ID Consult for abx co-management - will likely need inhaled +/- systemic abx  c/w home O2   c/w NIPPV qhs at her home settings. Last settings documented at [facility]:  AVAPS RR 18, EPAP 5, Min IP 15, Max IP 25,   - start Zosyn IV  - consider RCU for specialized care  - appreciate pulm recs  - 1L bolus    # T2DM  - Lantus 12U, premeal 6U  - insulin sliding scale    # HTN  - likely contribution of pain in addition to chronic HTN  - amlodipine, metoprolol succinate  - consider adding losartan after further med rec    # anxiety/depression  - c/w venlafaxine    # cirrhosis  - trend INR/PT, LFT, CBC  - avoid >2g acetaminophen    # Ppx  - VTE ppx: enoxaparin 40 qD  - GI ppx: pantoprazole as above  - Diet: DASH/carb controlled  - dispo: pending clinical course

## 2022-09-28 NOTE — CONSULT NOTE ADULT - ASSESSMENT
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 Pulmonary consulted for co-management of bronchiectasis.    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 Pulmonary consulted for co-management of bronchiectasis. RVP negative. Pt follows with Dr. Young as outpt. Pt with multiple admissions for bronchiectasis in the past.    #Bronchiectasis  #Chronic Respiratory Failure    Pt appears comfortable on 2L NC, saturating 97%. In setting of increased sputum production, it is reasonable to start antibiotics. Multiple sputum cultures in the past with pseudomonas aeruginosa (sensitive to geoff and zosyn in 8/23/2022).     Recommendations:  Continue home inhalers - albuterol, spiriva, symbicort  C/w home airway clearance regimen: hypersal, aerobica  ID Consult for abx co-management  c/w home O2    ***NOTE INCOMPLETE UNTIL ATTENDING ATTESTATION DONE***   67F w/ Primary Ciliary Dyskinesia, Chronic hyoxemic/hypercapneic Respiratory Failure on 2-3L Home O2 NC secondary to Bronchiectasis on IVAPS, Cirrhosis, Prior CBD stricture s/p dilation, stent placement and removal, HCV Ab+, presented to the ED for abdominal pain. Pulmonary consulted for co-management of bronchiectasis. RVP negative. Pt follows with Dr. Young as outpt. Pt with multiple admissions for bronchiectasis in the past.    #Bronchiectasis  #Chronic Respiratory Failure    Pt appears comfortable on 2L NC, saturating 97%. In setting of increased sputum production, it is reasonable to start antibiotics. Multiple sputum cultures in the past with pseudomonas aeruginosa (sensitive to geoff and zosyn in 8/23/2022).     Recommendations:  Continue home inhalers - albuterol, spiriva, symbicort  C/w home airway clearance regimen: hypersal, aerobica  ID Consult for abx co-management  c/w home O2   c/w NIPPV qhs at her home settings. Last settings documented at [facility]:  AVAPS RR 18, EPAP 5, Min IP 15, Max IP 25,       ***NOTE INCOMPLETE UNTIL ATTENDING ATTESTATION DONE***   67F w/ Primary Ciliary Dyskinesia, Chronic hyoxemic/hypercapneic Respiratory Failure on 2-3L Home O2 NC secondary to Bronchiectasis on IVAPS, Cirrhosis, Prior CBD stricture s/p dilation, stent placement and removal, HCV Ab+, presented to the ED for abdominal pain. Pulmonary consulted for co-management of bronchiectasis. RVP negative. Pt follows with Dr. Young as outpt. Pt with multiple admissions for bronchiectasis in the past.    #Bronchiectasis  #Chronic Respiratory Failure    Pt appears comfortable on 2L NC, saturating 97%. In setting of increased sputum production, it is reasonable to start antibiotics. Multiple sputum cultures in the past with pseudomonas aeruginosa (sensitive to geoff and zosyn in 8/23/2022), klebsiella and elizabethkingia meningoseptica.    Recommendations:  Continue home inhalers - albuterol, spiriva, symbicort  C/w home airway clearance regimen: hypersal, aerobica  ID Consult for abx co-management - will likely need inhaled +/- systemic abx  c/w home O2   c/w NIPPV qhs at her home settings. Last settings documented at [facility]:  AVAPS RR 18, EPAP 5, Min IP 15, Max IP 25,       ***NOTE INCOMPLETE UNTIL ATTENDING ATTESTATION DONE***   67F w/ Primary Ciliary Dyskinesia, Chronic hyoxemic/hypercapneic Respiratory Failure on 2-3L Home O2 NC secondary to Bronchiectasis on IVAPS, Cirrhosis, Prior CBD stricture s/p dilation, stent placement and removal, HCV Ab+, presented to the ED for abdominal pain. Pulmonary consulted for co-management of bronchiectasis. RVP negative. Pt follows with Dr. Young as outpt. Pt with multiple admissions for bronchiectasis in the past.    #Bronchiectasis Exacerbation  #Chronic hypoxemic and hypercapnic respiratory Failure    Pt appears comfortable on 2L NC, saturating 97%. In setting of increased sputum production, it is reasonable to start antibiotics. Multiple sputum cultures in the past with pseudomonas aeruginosa (sensitive to geoff and zosyn in 8/23/2022), klebsiella and elizabethkingia meningoseptica.    Recommendations:  Continue home inhalers - albuterol, spiriva, symbicort  C/w home airway clearance regimen: hypersal, aerobica  ID Consult for abx co-management - will likely need inhaled +/- systemic abx  c/w home O2   c/w NIPPV qhs at her home settings. Last settings documented at [facility]:  AVAPS RR 18, EPAP 5, Min IP 15, Max IP 25,       ***NOTE INCOMPLETE UNTIL ATTENDING ATTESTATION DONE***

## 2022-09-28 NOTE — ED PROVIDER NOTE - PHYSICAL EXAMINATION
Rectal with no gross blood, hemorrhoids or fissures. No impacted stool.  Epigastrium TTP. Suprapubic fullness. GENERAL: Awake, alert, NAD  HEENT: NC/AT, moist mucous membranes, PERRL, EOMI  LUNGS: CTAB, no wheezes or crackles   CARDIAC: RRR, no m/r/g  ABDOMEN: Epigastrium TTP. Suprapubic fullness. Soft, normal BS, non distended, no rebound, no guarding  BACK: No midline spinal tenderness, no CVA tenderness  EXT: No edema, no calf tenderness, 2+ DP pulses bilaterally, no deformities.  NEURO: A&Ox3. Moving all extremities.  SKIN: Warm and dry. No rash.  PSYCH: Normal affect.   RECTAL: no gross blood, hemorrhoids or fissures, no impacted stool.

## 2022-09-28 NOTE — ED PROVIDER NOTE - CLINICAL SUMMARY MEDICAL DECISION MAKING FREE TEXT BOX
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 Patient notes SOB has worsened over past 20 years. Follows actively with pulm here. Has developed intermittent epigastric pain x2 weeks 67 F with PMH constipation, CF c/b bronchiectasis (on 2-3L home oxygen), PSH cholecystectomy presenting with SOB and abdominal pain. Patient notes SOB has worsened over past 20 years. Follows actively with pulm here. Has developed intermittent epigastric pain x2 weeks, no associated N/V. Is constipated. VSS, afebrile, non toxic appearing. Epigastrium TTP, suprapubic tenderness, nothing abnormal on rectal exam. DDx includes pancreatits vs retained stone vs constipation vs gastritis vs GERD vs peptic ulcer disease. Will get labs, UA/UC, CT A/P to r/o SBO. reasess.

## 2022-09-28 NOTE — H&P ADULT - ATTENDING COMMENTS
Patient seen and examined 9/28    67 year old woman with past medical history of chronic hypoxemic / hypercapnic respiratory failure 2/2 ciliary dyskinesia c/b bronchiectasis/ COPD (on 2-3L home oxygen) on AVAPS at night several prior admissions due to exacerbations/pneumonia episodes, constipation, PSH cholecystectomy a/w abdominal pain etiology likely choledocholithiasis + constipation also a/w bronchiectasis exacerbation. Surgery and GI consulted, no acute surgical intervention indicated, no inpatient GI intervention indicated. MRCP recommended by surgery to further delineate bile duct anatomy. Notably bili wnl and patient with normal white count. GI plan for outpatient ERCP once further removed from her bronchiectasis exacerbation. For her bronchiectasis exacerbation pulmonary following, continuing home inhalers, airway clearance, c/w home O2, AVAPS at night. ID consult in AM for input on inhaled +/- systemic abx. Started on zosyn for pseudomonas coverage, after appreciating prior cultures will change to meropenem.

## 2022-09-28 NOTE — H&P ADULT - HISTORY OF PRESENT ILLNESS
67F with PMH cystic fibrosis (CF) versus primary ciliary dyskinesia (PCD), on home O2 at 2-3L NC, ABPA, allergic rhinitis, recurrent admissions for pneumonia; constipation; GERD, T2DM on insulin, anxiety, depression, cirrhosis, PSH remote cholecystectomy c/b strictures with remote PTC/PTBD/stent, presenting with 2-3 weeks of diffuse abdominal pain worst in epigastric region.    History from patient (with phone ) limited by pain and patient declining further interview. She endorsed 2 weeks of abdominal pain that was almost too much to bear. The pain was diffusely distributed in the abdomen but primarily in the epigastric area. She said she took only medications that were prescribed and, holding packet of omeprazole, said that her home medication provided only 1-2 minutes of relief. She endorsed a feeling of bloating and asked for a tube to help her empty her bowels.    Per son, English-speaking, interviewed by phone: Patient has had pain for the last 2-3 weeks in the upper and lower abdomen; it is prompted by eating. She eats a balanced diet but has had poor intake recently, with early satiety. She has lost weight, being 97-98 lbs at baseline, now 92 lbs. He endorsed her having a BM yesterday. He confirmed a remote history of cholecystectomy and that the stent for strictures was removed years ago.     He said his mother had a history of cystic fibrosis, a term he has seen in her medical records, and was unfamiliar with the term primary ciliary dyskinesia. She also coughs a lot; son thinks mucus is more thick than usual recently.     He denied patient having personal/family history of heart disease or cancer.     He endorsed helping patient take her prescription medicines, except for insulin, which patient administers to herself.    Per chart review, patient has a history of primary ciliary dyskinesia with multiple admissions for COPD exacerbation and pneumonia, with use of home O2 2-3L NC, T2DM on insulin, anxiety, depression,  cirrhosis not confirmed by liver biopsy, and cholecystectomy (1998). In the ED, she endorsed shortness of breath in addition to abdominal pain. She received duonebs, 1L NS, Maalox, Pepcid, and pantoprazole. She was afebrile, HR 70-80s, hypertensive to 150s/80s, RR 15-20, satting 100% on 2LNC. CT/AP revealed new punctate nonobstructive choledocholithiasis, and U/S showed intrahepatic pneumobilia. She was found to be retaining 1L urine due to increased stool burden, and a Rosenberg was placed. 67F with PMH cystic fibrosis (CF) versus primary ciliary dyskinesia (PCD), on home O2 at 2-3L NC, ABPA, allergic rhinitis, recurrent admissions for pneumonia; constipation; GERD, T2DM on insulin, anxiety, depression, cirrhosis, PSH remote cholecystectomy c/b strictures with remote PTC/PTBD/stent, presenting with 2-3 weeks of diffuse abdominal pain worst in epigastric region.    History from patient (with phone ) limited by pain and patient declining further interview. She endorsed 2 weeks of abdominal pain that was almost too much to bear. The pain was diffusely distributed in the abdomen but primarily in the epigastric area. She said she took only medications that were prescribed and, holding packet of omeprazole, said that her home medication provided only 1-2 minutes of relief. She endorsed a feeling of bloating and asked for a tube to help her empty her bowels.    Per son, English-speaking, interviewed by phone: Patient has had pain for the last 2-3 weeks in the upper and lower abdomen; it is prompted by eating. She eats a balanced diet but has had poor intake recently, with early satiety. She has lost weight, being 97-98 lbs at baseline, now 92 lbs. He endorsed her having a BM yesterday. He confirmed a remote history of cholecystectomy and that the stent for strictures was removed years ago.     He said his mother had a history of cystic fibrosis, a term he has seen in her medical records, and was unfamiliar with the term primary ciliary dyskinesia. She also coughs a lot; son thinks mucus is more thick than usual recently.     He denied patient having personal/family history of heart disease or cancer.     He endorsed helping patient take her prescription medicines, except for insulin, which patient administers to herself. He was unable to provide medication list during the call since he was at work and medications were at home.    Per chart review, patient has a history of primary ciliary dyskinesia with multiple admissions for COPD exacerbation and pneumonia, with use of home O2 2-3L NC, T2DM on insulin, anxiety, depression,  cirrhosis not confirmed by liver biopsy, and cholecystectomy (1998). In the ED, she endorsed shortness of breath in addition to abdominal pain. She received duonebs, 1L NS, Maalox, Pepcid, and pantoprazole. She was afebrile, HR 70-80s, hypertensive to 150s/80s, RR 15-20, satting 100% on 2LNC. CT/AP revealed new punctate nonobstructive choledocholithiasis, and U/S showed intrahepatic pneumobilia. She was found to be retaining 1L urine due to increased stool burden, and a Rosenberg was placed.

## 2022-09-28 NOTE — ED PROVIDER NOTE - ATTENDING CONTRIBUTION TO CARE
Afebrile. Awake and Alert. Lungs CTA. Heart RRR. Abdomen soft, mild epigastric TTP, ND. CN II-XII grossly intact. Moves all extremities without lateralization.    SOB with sputum  progressively worse over 20 years  h/o bronchiectasis and aspergillus positive sputum, COVID in January 2022  On 2-2.5L ATC  95% RA  r/o PNA  r/o viral URI    Abdominal pain  Epigastric h/o esophageal strictures and GERD, r/o pancreatitis, gastritis  Lower abd pain: r/o UTI, r/o constipatin 2/2 hemorrhoids  r/o SBO

## 2022-09-28 NOTE — CONSULT NOTE ADULT - SUBJECTIVE AND OBJECTIVE BOX
Chief Complaint:  Patient is a 67y old  Female who presents with a chief complaint of     HPI:  Ms. Sky is a 66yo F with PMH of constipation, ciliary dyskinesia c/b bronchiectasis/COPD (on 2-3L home oxygen), several prior admissions due to COPD exacerbations/pneumonia episodes, PSH cholecystectomy (, c/b CBD strictures managed with PTC/PTBD and stent placement that was removed), liver cirrhosis on imaging who presents with SOB and epigastric abdominal pain for two weeks. GI consulted for choledocholithiasis.    Patient reports progressive SOB. No fever or chills. No nausea or vomiting.             Patient notes SOB has progressively worsened over past 20 years. Follows actively with pulmonology at [facility]. No nausea, vomiting or diarrhea, has been passing flatus and had a bowel movement within 2 days ago, but is chronically constipated. Denies dark or bloody stools, fevers, cough, CP, hematuria, dysuria.    Surgery consulted for evaluation and management of choledocholithiasis based on CT findings of punctate non obstructing choledocholithiasis in the distal common duct, new from the prior study. Tbili 0.2, WBC 9.6.    Allergies:  No Known Allergies      Home Medications:    Hospital Medications:      PMHX/PSHX:  DM (diabetes mellitus)    Bronchitis    ABPA (allergic bronchopulmonary aspergillosis)    Bronchiectasis    Asthma    Hypertension    Vitamin D deficiency    Microcytic anemia    GERD (gastroesophageal reflux disease)    Postnasal drip    Pseudomonas aeruginosa colonization    Allergic rhinitis    Recurrent pneumonia    Type 2 diabetes mellitus, with long-term current use of insulin    Anxiety and depression    History of cholecystectomy    S/P dilatation of esophageal stricture        Family history:  No pertinent family history in first degree relatives    Family history of diabetes mellitus    Family history of allergic rhinitis (Child, Child)    Family history of bronchitis        Denies family history of colon cancer/polyps, stomach cancer/polyps, pancreatic cancer/masses, liver cancer/disease, ovarian cancer and endometrial cancer.    Social History:     Tob: Denies  EtOH: As above  Illicit Drugs: Denies    ROS:   General:  No wt loss, fevers, chills, night sweats, fatigue  Eyes:  Good vision, no reported pain  ENT:  No sore throat, pain, runny nose, dysphagia  CV:  No pain, palpitations, hypo/hypertension  Pulm:  No dyspnea, cough, tachypnea, wheezing  GI:  As per HPI  :  No pain, bleeding, incontinence, nocturia  Muscle:  No pain, weakness  Neuro:  No weakness, tingling, memory problems  Psych:  No fatigue, insomnia, mood problems, depression  Endocrine:  No polyuria, polydipsia, cold/heat intolerance  Heme:  No petechiae, ecchymosis, easy bruisability  Skin:  No rash, tattoos, scars, edema    PHYSICAL EXAM:   GENERAL:  No acute distress  HEENT:  Normocephalic/atraumatic, no scleral icterus  CHEST:  No accessory muscle use  HEART:  Regular rate and rhythm  ABDOMEN:  Soft, non-tender, non-distended, normoactive bowel sounds,  no masses, no hepato-splenomegaly, no signs of chronic liver disease  EXTREMITIES: No cyanosis, clubbing, or edema  SKIN:  No rash  NEURO:  Alert and oriented x 3, no asterixis    Vital Signs:  Vital Signs Last 24 Hrs  T(C): 36.7 (28 Sep 2022 06:00), Max: 36.9 (27 Sep 2022 21:09)  T(F): 98.1 (28 Sep 2022 06:00), Max: 98.5 (27 Sep 2022 21:09)  HR: 80 (28 Sep 2022 08:38) (71 - 81)  BP: 151/83 (28 Sep 2022 08:38) (134/73 - 154/88)  BP(mean): --  RR: 18 (28 Sep 2022 08:38) (15 - 20)  SpO2: 99% (28 Sep 2022 08:38) (96% - 100%)    Parameters below as of 28 Sep 2022 08:38  Patient On (Oxygen Delivery Method): nasal cannula  O2 Flow (L/min): 2    Daily Height in cm: 162.56 (27 Sep 2022 21:09)    Daily     LABS:                        13.1   9.61  )-----------( 178      ( 28 Sep 2022 02:13 )             41.3     Mean Cell Volume: 85.3 fL (-22 @ 02:13)        133<L>  |  94<L>  |  13  ----------------------------<  94  4.2   |  29  |  0.31<L>    Ca    9.7      28 Sep 2022 02:13    TPro  6.7  /  Alb  3.3  /  TBili  0.2  /  DBili  x   /  AST  31  /  ALT  32  /  AlkPhos  150<H>      LIVER FUNCTIONS - ( 28 Sep 2022 02:13 )  Alb: 3.3 g/dL / Pro: 6.7 g/dL / ALK PHOS: 150 U/L / ALT: 32 U/L / AST: 31 U/L / GGT: x             Urinalysis Basic - ( 28 Sep 2022 07:59 )    Color: Light Yellow / Appearance: Clear / S.024 / pH: x  Gluc: x / Ketone: Negative  / Bili: Negative / Urobili: <2 mg/dL   Blood: x / Protein: Trace / Nitrite: Positive   Leuk Esterase: Negative / RBC: 3 /HPF / WBC 1 /HPF   Sq Epi: x / Non Sq Epi: 2 /HPF / Bacteria: Negative      Amylase Serum--      Lipase serum19       Ammonia--                          13.1   9.61  )-----------( 178      ( 28 Sep 2022 02:13 )             41.3       Imaging:             Chief Complaint:  Patient is a 67y old  Female who presents with a chief complaint of     HPI:  Ms. Sky is a 68yo F with PMH of constipation, ciliary dyskinesia c/b bronchiectasis/COPD (on 2-3L home oxygen), several prior admissions due to COPD exacerbations/pneumonia episodes, PSH cholecystectomy (, c/b CBD strictures managed with PTC/PTBD and stent placement that was removed), liver cirrhosis on imaging who presents with worsening SOB for 2 weeks. GI consulted for choledocholithiasis.    Patient reports progressive SOB for the past week with worsening oxygen requirements. No fever or chills. No nausea or vomiting. No productive cough.  She also reports intermittent epigastric and lower abdominal pain, cramping, better after passing flatus. Reports chronic constipation, last BM 2 days prior to admission.     Patient underwent CT chest, abdomen pelvis showing punctate non obstructing choledocholithiasis in the distal common duct. GI consulted. Tbili 0.2, WBC 9.6.    Allergies:  No Known Allergies      Home Medications:  Admelog SoloStar 100 units/mL injectable solution: 6 unit(s) injectable 3 times a day (before meals) (2022 18:29)  bisacodyl 5 mg oral delayed release tablet: 1 tab(s) orally 2 times a day (2022 18:29)  Calcium 600+D 600 mg-5 mcg (200 intl units) oral tablet: 1 tab(s) orally 2 times a day (2022 18:29)  Effexor XR 37.5 mg oral capsule, extended release: 1 cap(s) orally once a day (2022 15:05)  famotidine 40 mg oral tablet: 1 tab(s) orally once a day (at bedtime) (2022 18:29)  ferrous gluconate 324 mg (37.5 mg elemental iron) oral tablet: 1 tab(s) orally 2 times a day (2022 18:29)  Flonase 50 mcg/inh nasal spray: 1 spray(s) in each nostril once a day (2022 18:29)  ketotifen 0.025% ophthalmic solution: 1 drop(s) to each affected eye 2 times a day (2022 18:29)  losartan 50 mg oral tablet: 1 tab(s) orally once a day (2022 18:29)  metoprolol succinate 50 mg oral tablet, extended release: 1 tab(s) orally once a day (2022 18:29)  mirtazapine 7.5 mg oral tablet: 1 tab(s) orally once a day (at bedtime) (2022 18:29)  montelukast 10 mg oral tablet: 1 tab(s) orally once a day (2022 18:29)  omeprazole 20 mg oral delayed release capsule: 1 cap(s) orally once a day (2022 18:29)  ProAir HFA 90 mcg/inh inhalation aerosol: 2 puff(s) inhaled every 6 hours, As Needed (2022 18:29)  sennosides-docusate 8.6 mg-50 mg oral tablet: 2 tab(s) orally once a day (at bedtime) (2022 18:29)  Spiriva 18 mcg inhalation capsule: 1 cap(s) inhaled once a day (2022 18:29)  Symbicort 160 mcg-4.5 mcg/inh inhalation aerosol: 2 puff(s) inhaled 2 times a day (2022 18:29)    Hospital Medications:      PMHX/PSHX:  DM (diabetes mellitus)    Bronchitis    ABPA (allergic bronchopulmonary aspergillosis)    Bronchiectasis    Asthma    Hypertension    Vitamin D deficiency    Microcytic anemia    GERD (gastroesophageal reflux disease)    Postnasal drip    Pseudomonas aeruginosa colonization    Allergic rhinitis    Recurrent pneumonia    Type 2 diabetes mellitus, with long-term current use of insulin    Anxiety and depression    History of cholecystectomy    S/P dilatation of esophageal stricture        Family history:  No pertinent family history in first degree relatives    Family history of diabetes mellitus    Family history of allergic rhinitis (Child, Child)    Family history of bronchitis        Denies family history of colon cancer/polyps, stomach cancer/polyps, pancreatic cancer/masses, liver cancer/disease, ovarian cancer and endometrial cancer.    Social History:     Tob: Denies  EtOH: As above  Illicit Drugs: Denies    ROS:   General:  No wt loss, fevers, chills, night sweats, fatigue  Eyes:  Good vision, no reported pain  ENT:  No sore throat, pain, runny nose, dysphagia  CV:  No pain, palpitations, hypo/hypertension  Pulm:  As per HPI  GI:  As per HPI  :  No pain, bleeding, incontinence, nocturia  Muscle:  No pain, weakness  Neuro:  No weakness, tingling, memory problems  Psych:  No fatigue, insomnia, mood problems, depression  Endocrine:  No polyuria, polydipsia, cold/heat intolerance  Heme:  No petechiae, ecchymosis, easy bruisability  Skin:  No rash, tattoos, scars, edema    PHYSICAL EXAM:   GENERAL:  No acute distress, on NC  HEENT:  Normocephalic/atraumatic, no scleral icterus  CHEST:  No accessory muscle use  HEART:  Regular rate and rhythm  ABDOMEN:  Soft, non-tender, non-distended  EXTREMITIES: No cyanosis, clubbing, or edema  SKIN:  No rash  NEURO:  Alert and oriented x 3, no asterixis    Vital Signs:  Vital Signs Last 24 Hrs  T(C): 36.7 (28 Sep 2022 06:00), Max: 36.9 (27 Sep 2022 21:09)  T(F): 98.1 (28 Sep 2022 06:00), Max: 98.5 (27 Sep 2022 21:09)  HR: 80 (28 Sep 2022 08:38) (71 - 81)  BP: 151/83 (28 Sep 2022 08:38) (134/73 - 154/88)  BP(mean): --  RR: 18 (28 Sep 2022 08:38) (15 - 20)  SpO2: 99% (28 Sep 2022 08:38) (96% - 100%)    Parameters below as of 28 Sep 2022 08:38  Patient On (Oxygen Delivery Method): nasal cannula  O2 Flow (L/min): 2    Daily Height in cm: 162.56 (27 Sep 2022 21:09)    Daily     LABS:                        13.1   9.61  )-----------( 178      ( 28 Sep 2022 02:13 )             41.3     Mean Cell Volume: 85.3 fL (-22 @ 02:13)        133<L>  |  94<L>  |  13  ----------------------------<  94  4.2   |  29  |  0.31<L>    Ca    9.7      28 Sep 2022 02:13    TPro  6.7  /  Alb  3.3  /  TBili  0.2  /  DBili  x   /  AST  31  /  ALT  32  /  AlkPhos  150<H>      LIVER FUNCTIONS - ( 28 Sep 2022 02:13 )  Alb: 3.3 g/dL / Pro: 6.7 g/dL / ALK PHOS: 150 U/L / ALT: 32 U/L / AST: 31 U/L / GGT: x             Urinalysis Basic - ( 28 Sep 2022 07:59 )    Color: Light Yellow / Appearance: Clear / S.024 / pH: x  Gluc: x / Ketone: Negative  / Bili: Negative / Urobili: <2 mg/dL   Blood: x / Protein: Trace / Nitrite: Positive   Leuk Esterase: Negative / RBC: 3 /HPF / WBC 1 /HPF   Sq Epi: x / Non Sq Epi: 2 /HPF / Bacteria: Negative      Amylase Serum--      Lipase serum19       Ammonia--                          13.1   9.61  )-----------( 178      ( 28 Sep 2022 02:13 )             41.3       Imaging:

## 2022-09-28 NOTE — ED PROVIDER NOTE - PROGRESS NOTE DETAILS
Jacike Ramos PGY-3: POCUS bladder with approx 1L urine. Patient in retention in setting of large stool burden. Rectal exam without gross blood, hemorrhoids, fissures, no impacted stool in vault. To place [facility] and send UA. Plan still for admission. case d/w general surgery recommending GI evaluation for MRCP vs ERCP LUZ GUDINO: pt reassessed, updated patients daughter via patients cell phone as per patients request. concerned in regard to her breathing. however made aware of CBD stone, and need for GI/General surgery evaluation. and will touch base w/ pulmonology team for medical optimization.     case d/w general surgery recommending GI evaluation for MRCP vs ERCP. LUZ GUDINO: pt reassessed, satting 97% on 2LNC. + epigastric / RUQ ttp.   I updated patients daughter via patients cell phone as per patients request. concerned in regard to her breathing. however made aware of CBD stone, and need for GI/General surgery evaluation. and will touch base w/ pulmonology team for medical optimization.     case d/w general surgery recommending GI evaluation for MRCP vs ERCP. general surgery recommending MRCP/admission to medicine, GI following  case d/w pulm, recommending sputum culture, will evaluate.

## 2022-09-28 NOTE — ED PROVIDER NOTE - NS ED ROS FT
CONST: no fevers, no chills  EYES: no pain, no vision changes  ENT: no sore throat, no ear pain, no change in hearing  CV: no chest pain, no leg swelling  RESP: + shortness of breath, no cough  ABD: + abdominal pain, no nausea, no vomiting, no diarrhea, + constipation  : no dysuria, no flank pain, no hematuria  MSK: no back pain, no extremity pain  NEURO: no headache or additional neurologic complaints  HEME: no easy bleeding  SKIN:  no rash

## 2022-09-28 NOTE — CONSULT NOTE ADULT - ATTENDING COMMENTS
I have reviewed the history, pertinent labs and imaging, and discussed the care with the consult resident.  More than 50% of this 55 minute encounter including face to face with the patient was spent counseling and/or coordination of care on choledocholithiasis.  Time included review of vitals, labs, imaging, discussion with consultants and coordination with the operating room/emergency department.    66yo F with h/o cholecystectomy complicated by cbd stricture managed with PTC and stent, subsequently removed, presents with 2weeks epigastric pain. Normal bili. CT with choledocholithiasis, nonobstructing. Pain consistent with choledocholithiasis. Recommend GI eval for ERCP.     - Medicine for shortness of breath/COPD exacerbation   - GI eval for possible ERCP   - No acute surgical intervention, ERCP would be first line treatment for choledocholithiasis   - Follow up as needed. Please call with further questions or concerns.     The active issues are:  1. choledocholithiasis     The Acute Care Surgery (B Team) Attending Group Practice:  Dr. Candace Hanna    urgent issues - spectra [id number]  nonurgent issues - [phone]  patient appointments or afterhours - [phone]
66yo F with PMH of constipation, ciliary dyskinesia c/b bronchiectasis/COPD (on 2-3L home oxygen), several prior admissions due to COPD exacerbations/pneumonia episodes, PSH cholecystectomy (1998, c/b CBD strictures managed with PTC/PTBD and stent placement that was removed), liver cirrhosis on imaging who presents with worsening SOB for 2 weeks. GI consulted for choledocholithiasis.    # Choledocholithiasis - appears to be unchanged based on review of previous imaging. Bilirubin normal, low suspicion for obstruction. Patient will benefit from non-urgent ERCP, which should be deferred until patient is optimized from a respiratory standpoint. Would plan for outpatient follow up and outpatient ECRP.
67 year old female with primary ciliary dyskinesia, bronchiectasis, and chronic hypoxemic and hypercapnic respiratory on AVAPS presents with abdominal pain and increased sputum production. Found to have choledocholithiasis which is unchanged with low suspicion for obstruction. GI recommending non-urgent ERCP. Given increased sputum production would treat for acute exacerbation of her bronchiectasis.     Agree with empiric antibiotics.   Consider ID consult to help tailor antibiotic choice given multiple resistant organisms growing on prior sputum culture.   Follow up sputum culture results  Bronchodilator and airway clearance regimen  Supplemental O2 during daytime to target SpO2 > 92  AVAPS qHS

## 2022-09-28 NOTE — H&P ADULT - NSHPSOCIALHISTORY_GEN_ALL_CORE
Per son:  Patient lives with spouse and son, daughter-in-law, and grandchildren  She is retired. In [place], she was a tailor.  She does not use tobacco or drink.

## 2022-09-28 NOTE — CONSULT NOTE ADULT - SUBJECTIVE AND OBJECTIVE BOX
67 F with PMH constipation, ciliary dyskinesia c/b bronchiectasis/COPD (on 2-3L home oxygen), several prior admissions due to COPD exacerbations/pneumonia episodes, PSH cholecystectomy (1998, c/b CBD strictures managed with PTC/PTBD and stent placement that was removed per patient/family), liver cirrhosis not confirmed by IR biopsy, presenting with SOB and epigastric abdominal pain for two weeks. Patient notes SOB has progressively worsened over past 20 years. Follows actively with pulmonology at [facility]. No nausea, vomiting or diarrhea, has been passing flatus and had a bowel movement within 2 days ago, but is chronically constipated. Denies dark or bloody stools, fevers, cough, CP, hematuria, dysuria.    Surgery consulted for evaluation and management of choledocholithiasis based on CT findings of punctate nonobstructing choledocholithiasis in the distal common duct, new from the prior study. Tbili 0.2, WBC 9.6.    Vital Signs Last 24 Hrs  T(C): 36.7 (28 Sep 2022 06:00), Max: 36.9 (27 Sep 2022 21:09)  T(F): 98.1 (28 Sep 2022 06:00), Max: 98.5 (27 Sep 2022 21:09)  HR: 80 (28 Sep 2022 08:38) (71 - 81)  BP: 151/83 (28 Sep 2022 08:38) (134/73 - 154/88)  BP(mean): --  RR: 18 (28 Sep 2022 08:38) (15 - 20)  SpO2: 99% (28 Sep 2022 08:38) (96% - 100%)    Parameters below as of 28 Sep 2022 08:38  Patient On (Oxygen Delivery Method): nasal cannula  O2 Flow (L/min): 2    General: NAD  Cardiac: Regular rate  Respiratory: Breath sounds clear and equal bilaterally.  Abdominal Exam: soft, nondistended, nontender, RUQ incision well healed  Muskuloskeletal: Moves all 4 extremities spontaneously  Neurological: Alert and oriented, no gross focal deficits, no motor or sensory deficits.  Psych: AOX3       LABS:  09-28 @ 02:13 Creatine 43 U/L [25 - 170]                          13.1   9.61  )-----------( 178      ( 28 Sep 2022 02:13 )             41.3     09-28    133<L>  |  94<L>  |  13  ----------------------------<  94  4.2   |  29  |  0.31<L>    Ca    9.7      28 Sep 2022 02:13    TPro  6.7  /  Alb  3.3  /  TBili  0.2  /  DBili  x   /  AST  31  /  ALT  32  /  AlkPhos  150<H>  09-28      Home Medications:  Admelog SoloStar 100 units/mL injectable solution: 6 unit(s) injectable 3 times a day (before meals) (21 Jan 2022 18:29)  bisacodyl 5 mg oral delayed release tablet: 1 tab(s) orally 2 times a day (21 Jan 2022 18:29)  Calcium 600+D 600 mg-5 mcg (200 intl units) oral tablet: 1 tab(s) orally 2 times a day (21 Jan 2022 18:29)  Effexor XR 37.5 mg oral capsule, extended release: 1 cap(s) orally once a day (28 Jan 2022 15:05)  famotidine 40 mg oral tablet: 1 tab(s) orally once a day (at bedtime) (21 Jan 2022 18:29)  ferrous gluconate 324 mg (37.5 mg elemental iron) oral tablet: 1 tab(s) orally 2 times a day (21 Jan 2022 18:29)  Flonase 50 mcg/inh nasal spray: 1 spray(s) in each nostril once a day (21 Jan 2022 18:29)  ketotifen 0.025% ophthalmic solution: 1 drop(s) to each affected eye 2 times a day (21 Jan 2022 18:29)  losartan 50 mg oral tablet: 1 tab(s) orally once a day (21 Jan 2022 18:29)  metoprolol succinate 50 mg oral tablet, extended release: 1 tab(s) orally once a day (21 Jan 2022 18:29)  mirtazapine 7.5 mg oral tablet: 1 tab(s) orally once a day (at bedtime) (21 Jan 2022 18:29)  montelukast 10 mg oral tablet: 1 tab(s) orally once a day (21 Jan 2022 18:29)  omeprazole 20 mg oral delayed release capsule: 1 cap(s) orally once a day (21 Jan 2022 18:29)  ProAir HFA 90 mcg/inh inhalation aerosol: 2 puff(s) inhaled every 6 hours, As Needed (21 Jan 2022 18:29)  sennosides-docusate 8.6 mg-50 mg oral tablet: 2 tab(s) orally once a day (at bedtime) (21 Jan 2022 18:29)  Spiriva 18 mcg inhalation capsule: 1 cap(s) inhaled once a day (21 Jan 2022 18:29)  Symbicort 160 mcg-4.5 mcg/inh inhalation aerosol: 2 puff(s) inhaled 2 times a day (21 Jan 2022 18:29)          ACC: [id number] EXAM: CT ABDOMEN AND PELVIS IC  ACC: [id number] EXAM: CT CHEST IC    PROCEDURE DATE: 09/28/2022        INTERPRETATION: CLINICAL INFORMATION: Shortness of breath, abdominal pain and weakness    COMPARISON: CT chest from July 14, 2021 and CT abdomen and pelvis from July 30, 2008    CONTRAST/COMPLICATIONS:  IV Contrast: Omnipaque 350 60 cc administered 0 cc discarded  Oral Contrast: NONE  Complications: None reported at time of study completion    PROCEDURE:  CT of the Chest, Abdomen and Pelvis was performed.  Sagittal and coronal reformats were performed.    FINDINGS:  CHEST:  LUNGS AND LARGE AIRWAYS: Diffuse bronchiectasis with scattered areas of mucous plugging and peripheral tree-in-bud and nodular infiltrates, similar in distribution to the prior. Subpleural 9 mm nodular opacity in the superior segment right lower lobe is new compared to the prior. 7 mm irregular nodular opacity in the superior segment left lower lobe is unchanged.  PLEURA: No pleural effusion.  VESSELS: Normal caliber.  HEART: Heart size is normal. No pericardial effusion.  MEDIASTINUM AND STEPHANIE: A few borderline prominent mediastinal lymph nodes, nonspecific.  CHEST WALL AND LOWER NECK: Right thyroid nodule measures 11 mm.    ABDOMEN AND PELVIS:  LIVER: Pneumobilia redemonstrated. Cirrhosis.  BILE DUCTS: No biliary ductal dilatation. 2 small punctate calcification in the distal common duct, best seen on axial series 2, image 88 and coronal series 601, images 71 and 72.  GALLBLADDER: Cholecystectomy.  SPLEEN: Unremarkable  PANCREAS: Dystrophic calcification along the periphery of the pancreatic head near the pancreaticoduodenal junction.  ADRENALS: Within normal limits.  KIDNEYS/URETERS: Within normal limits.    BLADDER: Within normal limits.  REPRODUCTIVE ORGANS: Unremarkable CT appearance    BOWEL: No bowel obstruction. Normal appendix.Mild to moderate fecal retention in the right and transverse colon without significant bowel distention.  PERITONEUM: No free air or free fluid  VESSELS: Within normal limits.  RETROPERITONEUM/LYMPH NODES: No enlarged lymph nodes measuring greater than 10 mm in short axis, within the limitations of thin body habitus  ABDOMINAL WALL: Postsurgical changes.  BONES: No acute osseous abnormality.    IMPRESSION:    1. Pulmonary findings are compatible with a chronic infectious or inflammatory bronchitis and bronchiolitis.  2. Punctate nonobstructing choledocholithiasis in the distal common duct, new from the prior study.            --- End of Report ---            HARI ROSE MD; Attending Radiologist  This document has been electronically signed. Sep 28 2022 6:11AM

## 2022-09-28 NOTE — ED ADULT NURSE REASSESSMENT NOTE - NS ED NURSE REASSESS COMMENT FT1
AAOx4, increased work of breathing, use of accessory muscles, but able to speak in full sentences, MD Alba at bedside, fall precautions maintained

## 2022-09-28 NOTE — CONSULT NOTE ADULT - SUBJECTIVE AND OBJECTIVE BOX
Pulmonary Resident Consult Note    CHIEF COMPLAINT: Abdominal pain    HPI:    PAST MEDICAL & SURGICAL HISTORY:  ABPA (allergic bronchopulmonary aspergillosis)      Bronchiectasis      Hypertension      Vitamin D deficiency      Microcytic anemia      GERD (gastroesophageal reflux disease)      Postnasal drip      Pseudomonas aeruginosa colonization      Allergic rhinitis      Recurrent pneumonia      Type 2 diabetes mellitus, with long-term current use of insulin      Anxiety and depression      History of cholecystectomy      S/P dilatation of esophageal stricture          FAMILY HISTORY:  Family history of diabetes mellitus  father    Family history of allergic rhinitis (Child, Child)  son, daughter    Family history of bronchitis  mother, father        SOCIAL HISTORY:  Smoking: [ ] Never Smoked [ ] Former Smoker (__ packs x ___ years) [ ] Current Smoker  (__ packs x ___ years)  Substance Use: [ ] Never Used [ ] Used ____  EtOH Use:  Marital Status: [ ] Single [ ]  [ ]  [ ]   Sexual History:   Occupation:  Recent Travel:  Country of Birth:  Advance Directives:    Allergies    No Known Allergies    Intolerances        HOME MEDICATIONS:  Home Medications:  Admelog SoloStar 100 units/mL injectable solution: 6 unit(s) injectable 3 times a day (before meals) (2022 18:29)  bisacodyl 5 mg oral delayed release tablet: 1 tab(s) orally 2 times a day (2022 18:29)  Calcium 600+D 600 mg-5 mcg (200 intl units) oral tablet: 1 tab(s) orally 2 times a day (2022 18:29)  Effexor XR 37.5 mg oral capsule, extended release: 1 cap(s) orally once a day (2022 15:05)  famotidine 40 mg oral tablet: 1 tab(s) orally once a day (at bedtime) (2022 18:29)  ferrous gluconate 324 mg (37.5 mg elemental iron) oral tablet: 1 tab(s) orally 2 times a day (2022 18:29)  Flonase 50 mcg/inh nasal spray: 1 spray(s) in each nostril once a day (2022 18:29)  ketotifen 0.025% ophthalmic solution: 1 drop(s) to each affected eye 2 times a day (2022 18:29)  losartan 50 mg oral tablet: 1 tab(s) orally once a day (2022 18:29)  metoprolol succinate 50 mg oral tablet, extended release: 1 tab(s) orally once a day (2022 18:29)  mirtazapine 7.5 mg oral tablet: 1 tab(s) orally once a day (at bedtime) (2022 18:29)  montelukast 10 mg oral tablet: 1 tab(s) orally once a day (2022 18:29)  omeprazole 20 mg oral delayed release capsule: 1 cap(s) orally once a day (2022 18:29)  ProAir HFA 90 mcg/inh inhalation aerosol: 2 puff(s) inhaled every 6 hours, As Needed (2022 18:29)  sennosides-docusate 8.6 mg-50 mg oral tablet: 2 tab(s) orally once a day (at bedtime) (2022 18:29)  Spiriva 18 mcg inhalation capsule: 1 cap(s) inhaled once a day (2022 18:29)  Symbicort 160 mcg-4.5 mcg/inh inhalation aerosol: 2 puff(s) inhaled 2 times a day (2022 18:29)      REVIEW OF SYSTEMS:  Constitutional: [ ] negative [ ] fevers [ ] chills [ ] weight loss [ ] weight gain  HEENT: [ ] negative [ ] dry eyes [ ] eye irritation [ ] postnasal drip [ ] nasal congestion  CV: [ ] negative  [ ] chest pain [ ] orthopnea [ ] palpitations [ ] murmur  Resp: [ ] negative [ ] cough [ ] shortness of breath [ ] dyspnea [ ] wheezing [ ] sputum [ ] hemoptysis  GI: [ ] negative [ ] nausea [ ] vomiting [ ] diarrhea [ ] constipation [ ] abd pain [ ] dysphagia   : [ ] negative [ ] dysuria [ ] nocturia [ ] hematuria [ ] increased urinary frequency  Musculoskeletal: [ ] negative [ ] back pain [ ] myalgias [ ] arthralgias [ ] fracture  Skin: [ ] negative [ ] rash [ ] itch  Neurological: [ ] negative [ ] headache [ ] dizziness [ ] syncope [ ] weakness [ ] numbness  Psychiatric: [ ] negative [ ] anxiety [ ] depression  Endocrine: [ ] negative [ ] diabetes [ ] thyroid problem  Hematologic/Lymphatic: [ ] negative [ ] anemia [ ] bleeding problem  Allergic/Immunologic: [ ] negative [ ] itchy eyes [ ] nasal discharge [ ] hives [ ] angioedema  [ ] All other systems negative  [ ] Unable to assess ROS because ________    OBJECTIVE:  ICU Vital Signs Last 24 Hrs  T(C): 36.7 (28 Sep 2022 12:39), Max: 36.9 (27 Sep 2022 21:09)  T(F): 98 (28 Sep 2022 12:39), Max: 98.5 (27 Sep 2022 21:09)  HR: 79 (28 Sep 2022 12:39) (71 - 81)  BP: 144/66 (28 Sep 2022 12:39) (134/73 - 154/88)  BP(mean): --  ABP: --  ABP(mean): --  RR: 18 (28 Sep 2022 12:39) (15 - 20)  SpO2: 100% (28 Sep 2022 12:39) (96% - 100%)    O2 Parameters below as of 28 Sep 2022 12:39  Patient On (Oxygen Delivery Method): nasal cannula  O2 Flow (L/min): 2            CAPILLARY BLOOD GLUCOSE      POCT Blood Glucose.: 192 mg/dL (28 Sep 2022 13:07)      PHYSICAL EXAM:  General: awake and alert, nontoxic appearing *** lying in bed  HEENT: NC/AT, EOMI b/l, conjunctiva normal, MMM  Lymph Nodes: no cervical LAD  Neck: supple. full range of motion  Respiratory: CTA b/l, no w/r/c, appears comfortable on ***, no conversational dyspnea or accessory muscle use  Cardiovascular: S1 S2 present, RRR, no m/r/g  Abdomen: soft, NT/ND, +BS  Extremities: no c/c/e  Skin: no rashes or lesions noted  Neurological: AAOx3, no focal deficits  Psychiatry: calm, cooperative    LINES:     HOSPITAL MEDICATIONS:  Standing Meds:      PRN Meds:      LABS:                        13.1   9.61  )-----------( 178      ( 28 Sep 2022 02:13 )             41.3     Hgb Trend: 13.1<--  -    133<L>  |  94<L>  |  13  ----------------------------<  94  4.2   |  29  |  0.31<L>    Ca    9.7      28 Sep 2022 02:13    TPro  6.7  /  Alb  3.3  /  TBili  0.2  /  DBili  x   /  AST  31  /  ALT  32  /  AlkPhos  150<H>      Creatinine Trend: 0.31<--    Urinalysis Basic - ( 28 Sep 2022 07:59 )    Color: Light Yellow / Appearance: Clear / S.024 / pH: x  Gluc: x / Ketone: Negative  / Bili: Negative / Urobili: <2 mg/dL   Blood: x / Protein: Trace / Nitrite: Positive   Leuk Esterase: Negative / RBC: 3 /HPF / WBC 1 /HPF   Sq Epi: x / Non Sq Epi: 2 /HPF / Bacteria: Negative        Venous Blood Gas:   @ 02:13  7.35/66/58/36/88.3  VBG Lactate: 0.8      MICROBIOLOGY:       RADIOLOGY:  [ ] Reviewed and interpreted by me    PULMONARY FUNCTION TESTS:    EKG: Pulmonary Resident Consult Note    CHIEF COMPLAINT: Abdominal pain    HPI:  67F w/ Primary Ciliary Dyskinesia, Chronic hyoxemic/hypercapneic Respiratory Failure on 2-3L Home O2 NC secondary to Bronchiectasis on IVAPS, Cirrhosis, Prior CBD stricture s/p dilation, stent placement and removal, HCV Ab+, presented to the ED for abdominal pain. Pt complaining of abdominal pain during examination. Pt called her son who provided history. Pt son states she came to the hospital due to abdominal pain of 2 weeks. However, he noticed that her sputum production has increased over the past few days. She denies nausea/vomiting, fevers, dyspnea. She has chronic productive cough from bronchiectasis. Pt states she's been constipated for 5 days. Rosenberg catheter placed in the ED for urinary retention 1L.    As per outpt record review, pt tried on inhaled amikacin but was not able to tolerate it due to nausea and shaking. She was referred for transplant workup but refused.      PAST MEDICAL & SURGICAL HISTORY:  Bronchiectasis  Hypertension  Vitamin D deficiency  Microcytic anemia  GERD (gastroesophageal reflux disease)  Postnasal drip  Pseudomonas aeruginosa colonization  Allergic rhinitis  Recurrent pneumonia  Type 2 diabetes mellitus, with long-term current use of insulin  Anxiety and depression  History of cholecystectomy  S/P dilatation of esophageal stricture          FAMILY HISTORY:  Family history of diabetes mellitus  father    Family history of allergic rhinitis (Child, Child)  son, daughter    Family history of bronchitis  mother, father        SOCIAL HISTORY:  Smoking: [ ] Never Smoked [ ] Former Smoker (__ packs x ___ years) [ ] Current Smoker  (__ packs x ___ years)  Substance Use: [ ] Never Used [ ] Used ____  EtOH Use:  Marital Status: [ ] Single [ ]  [ ]  [ ]   Sexual History:   Occupation:  Recent Travel:  Country of Birth:  Advance Directives:    Allergies    No Known Allergies    Intolerances        HOME MEDICATIONS:  Home Medications:  Admelog SoloStar 100 units/mL injectable solution: 6 unit(s) injectable 3 times a day (before meals) (2022 18:29)  bisacodyl 5 mg oral delayed release tablet: 1 tab(s) orally 2 times a day (2022 18:29)  Calcium 600+D 600 mg-5 mcg (200 intl units) oral tablet: 1 tab(s) orally 2 times a day (2022 18:29)  Effexor XR 37.5 mg oral capsule, extended release: 1 cap(s) orally once a day (2022 15:05)  famotidine 40 mg oral tablet: 1 tab(s) orally once a day (at bedtime) (2022 18:29)  ferrous gluconate 324 mg (37.5 mg elemental iron) oral tablet: 1 tab(s) orally 2 times a day (2022 18:29)  Flonase 50 mcg/inh nasal spray: 1 spray(s) in each nostril once a day (2022 18:29)  ketotifen 0.025% ophthalmic solution: 1 drop(s) to each affected eye 2 times a day (2022 18:29)  losartan 50 mg oral tablet: 1 tab(s) orally once a day (2022 18:29)  metoprolol succinate 50 mg oral tablet, extended release: 1 tab(s) orally once a day (2022 18:29)  mirtazapine 7.5 mg oral tablet: 1 tab(s) orally once a day (at bedtime) (2022 18:29)  montelukast 10 mg oral tablet: 1 tab(s) orally once a day (2022 18:29)  omeprazole 20 mg oral delayed release capsule: 1 cap(s) orally once a day (2022 18:29)  ProAir HFA 90 mcg/inh inhalation aerosol: 2 puff(s) inhaled every 6 hours, As Needed (2022 18:29)  sennosides-docusate 8.6 mg-50 mg oral tablet: 2 tab(s) orally once a day (at bedtime) (2022 18:29)  Spiriva 18 mcg inhalation capsule: 1 cap(s) inhaled once a day (2022 18:29)  Symbicort 160 mcg-4.5 mcg/inh inhalation aerosol: 2 puff(s) inhaled 2 times a day (2022 18:29)      REVIEW OF SYSTEMS:  Constitutional: [ ] negative [ ] fevers [ ] chills [ ] weight loss [ ] weight gain  HEENT: [ ] negative [ ] dry eyes [ ] eye irritation [ ] postnasal drip [ ] nasal congestion  CV: [ ] negative  [ ] chest pain [ ] orthopnea [ ] palpitations [ ] murmur  Resp: [ ] negative [ ] cough [ ] shortness of breath [ ] dyspnea [ ] wheezing [ ] sputum [ ] hemoptysis  GI: [ ] negative [ ] nausea [ ] vomiting [ ] diarrhea [ ] constipation [ ] abd pain [ ] dysphagia   : [ ] negative [ ] dysuria [ ] nocturia [ ] hematuria [ ] increased urinary frequency  Musculoskeletal: [ ] negative [ ] back pain [ ] myalgias [ ] arthralgias [ ] fracture  Skin: [ ] negative [ ] rash [ ] itch  Neurological: [ ] negative [ ] headache [ ] dizziness [ ] syncope [ ] weakness [ ] numbness  Psychiatric: [ ] negative [ ] anxiety [ ] depression  Endocrine: [ ] negative [ ] diabetes [ ] thyroid problem  Hematologic/Lymphatic: [ ] negative [ ] anemia [ ] bleeding problem  Allergic/Immunologic: [ ] negative [ ] itchy eyes [ ] nasal discharge [ ] hives [ ] angioedema  [ ] All other systems negative  [ ] Unable to assess ROS because ________    OBJECTIVE:  ICU Vital Signs Last 24 Hrs  T(C): 36.7 (28 Sep 2022 12:39), Max: 36.9 (27 Sep 2022 21:09)  T(F): 98 (28 Sep 2022 12:39), Max: 98.5 (27 Sep 2022 21:09)  HR: 79 (28 Sep 2022 12:39) (71 - 81)  BP: 144/66 (28 Sep 2022 12:39) (134/73 - 154/88)  BP(mean): --  ABP: --  ABP(mean): --  RR: 18 (28 Sep 2022 12:39) (15 - 20)  SpO2: 100% (28 Sep 2022 12:39) (96% - 100%)    O2 Parameters below as of 28 Sep 2022 12:39  Patient On (Oxygen Delivery Method): nasal cannula  O2 Flow (L/min): 2            CAPILLARY BLOOD GLUCOSE      POCT Blood Glucose.: 192 mg/dL (28 Sep 2022 13:07)      PHYSICAL EXAM:  General: awake and alert, nontoxic appearing *** lying in bed  HEENT: NC/AT, EOMI b/l, conjunctiva normal, MMM  Lymph Nodes: no cervical LAD  Neck: supple. full range of motion  Respiratory: CTA b/l, no w/r/c, appears comfortable on ***, no conversational dyspnea or accessory muscle use  Cardiovascular: S1 S2 present, RRR, no m/r/g  Abdomen: soft, NT/ND, +BS  Extremities: no c/c/e  Skin: no rashes or lesions noted  Neurological: AAOx3, no focal deficits  Psychiatry: calm, cooperative    LINES:     HOSPITAL MEDICATIONS:  Standing Meds:      PRN Meds:      LABS:                        13.1   9.61  )-----------( 178      ( 28 Sep 2022 02:13 )             41.3     Hgb Trend: 13.1<--      133<L>  |  94<L>  |  13  ----------------------------<  94  4.2   |  29  |  0.31<L>    Ca    9.7      28 Sep 2022 02:13    TPro  6.7  /  Alb  3.3  /  TBili  0.2  /  DBili  x   /  AST  31  /  ALT  32  /  AlkPhos  150<H>      Creatinine Trend: 0.31<--    Urinalysis Basic - ( 28 Sep 2022 07:59 )    Color: Light Yellow / Appearance: Clear / S.024 / pH: x  Gluc: x / Ketone: Negative  / Bili: Negative / Urobili: <2 mg/dL   Blood: x / Protein: Trace / Nitrite: Positive   Leuk Esterase: Negative / RBC: 3 /HPF / WBC 1 /HPF   Sq Epi: x / Non Sq Epi: 2 /HPF / Bacteria: Negative        Venous Blood Gas:   @ 02:13  7.35/66/58/36/88.3  VBG Lactate: 0.8      MICROBIOLOGY:       RADIOLOGY:  [ ] Reviewed and interpreted by me    PULMONARY FUNCTION TESTS:    EKG: Pulmonary Resident Consult Note    CHIEF COMPLAINT: Abdominal pain    HPI:  67F w/ Primary Ciliary Dyskinesia, Chronic hyoxemic/hypercapneic Respiratory Failure on 2-3L Home O2 NC secondary to Bronchiectasis on IVAPS, Cirrhosis, Prior CBD stricture s/p dilation, stent placement and removal, HCV Ab+, presented to the ED for abdominal pain. Pt complaining of abdominal pain during examination. Pt called her son who provided history. Pt son states she came to the hospital due to abdominal pain of 2 weeks. However, he noticed that her sputum production has increased over the past few days. She denies nausea/vomiting, fevers, dyspnea. She has chronic productive cough from bronchiectasis. Pt states she's been constipated for 5 days. Francisco catheter placed in the ED for urinary retention 1L.    As per outpt record review, pt tried on inhaled amikacin but was not able to tolerate it due to nausea and shaking. She was referred for transplant workup but refused.      PAST MEDICAL & SURGICAL HISTORY:  Bronchiectasis  Hypertension  Vitamin D deficiency  Microcytic anemia  GERD (gastroesophageal reflux disease)  Postnasal drip  Pseudomonas aeruginosa colonization  Allergic rhinitis  Recurrent pneumonia  Type 2 diabetes mellitus, with long-term current use of insulin  Anxiety and depression  History of cholecystectomy  S/P dilatation of esophageal stricture          FAMILY HISTORY:  Family history of diabetes mellitus  father    Family history of allergic rhinitis (Child, Child)  son, daughter    Family history of bronchitis  mother, father        SOCIAL HISTORY:  Smoking: [ ] Never Smoked [ ] Former Smoker (__ packs x ___ years) [ ] Current Smoker  (__ packs x ___ years)  Substance Use: [ ] Never Used [ ] Used ____  EtOH Use:  Marital Status: [ ] Single [ ]  [ ]  [ ]   Sexual History:   Occupation:  Recent Travel:  Country of Birth:  Advance Directives:    Allergies    No Known Allergies    Intolerances        HOME MEDICATIONS:  Home Medications:  Admelog SoloStar 100 units/mL injectable solution: 6 unit(s) injectable 3 times a day (before meals) (2022 18:29)  bisacodyl 5 mg oral delayed release tablet: 1 tab(s) orally 2 times a day (2022 18:29)  Calcium 600+D 600 mg-5 mcg (200 intl units) oral tablet: 1 tab(s) orally 2 times a day (2022 18:29)  Effexor XR 37.5 mg oral capsule, extended release: 1 cap(s) orally once a day (2022 15:05)  famotidine 40 mg oral tablet: 1 tab(s) orally once a day (at bedtime) (2022 18:29)  ferrous gluconate 324 mg (37.5 mg elemental iron) oral tablet: 1 tab(s) orally 2 times a day (2022 18:29)  Flonase 50 mcg/inh nasal spray: 1 spray(s) in each nostril once a day (2022 18:29)  ketotifen 0.025% ophthalmic solution: 1 drop(s) to each affected eye 2 times a day (2022 18:29)  losartan 50 mg oral tablet: 1 tab(s) orally once a day (2022 18:29)  metoprolol succinate 50 mg oral tablet, extended release: 1 tab(s) orally once a day (2022 18:29)  mirtazapine 7.5 mg oral tablet: 1 tab(s) orally once a day (at bedtime) (2022 18:29)  montelukast 10 mg oral tablet: 1 tab(s) orally once a day (2022 18:29)  omeprazole 20 mg oral delayed release capsule: 1 cap(s) orally once a day (2022 18:29)  ProAir HFA 90 mcg/inh inhalation aerosol: 2 puff(s) inhaled every 6 hours, As Needed (2022 18:29)  sennosides-docusate 8.6 mg-50 mg oral tablet: 2 tab(s) orally once a day (at bedtime) (2022 18:29)  Spiriva 18 mcg inhalation capsule: 1 cap(s) inhaled once a day (2022 18:29)  Symbicort 160 mcg-4.5 mcg/inh inhalation aerosol: 2 puff(s) inhaled 2 times a day (2022 18:29)      REVIEW OF SYSTEMS:  Constitutional: [ ] negative [ ] fevers [ ] chills [ ] weight loss [ ] weight gain  HEENT: [ ] negative [ ] dry eyes [ ] eye irritation [ ] postnasal drip [ ] nasal congestion  CV: [ ] negative  [ ] chest pain [ ] orthopnea [ ] palpitations [ ] murmur  Resp: [ ] negative [ ] cough [ ] shortness of breath [ ] dyspnea [ ] wheezing [ ] sputum [ ] hemoptysis  GI: [ ] negative [ ] nausea [ ] vomiting [ ] diarrhea [ ] constipation [ ] abd pain [ ] dysphagia   : [ ] negative [ ] dysuria [ ] nocturia [ ] hematuria [ ] increased urinary frequency  Musculoskeletal: [ ] negative [ ] back pain [ ] myalgias [ ] arthralgias [ ] fracture  Skin: [ ] negative [ ] rash [ ] itch  Neurological: [ ] negative [ ] headache [ ] dizziness [ ] syncope [ ] weakness [ ] numbness  Psychiatric: [ ] negative [ ] anxiety [ ] depression  Endocrine: [ ] negative [ ] diabetes [ ] thyroid problem  Hematologic/Lymphatic: [ ] negative [ ] anemia [ ] bleeding problem  Allergic/Immunologic: [ ] negative [ ] itchy eyes [ ] nasal discharge [ ] hives [ ] angioedema  [ ] All other systems negative  [ ] Unable to assess ROS because ________    OBJECTIVE:  ICU Vital Signs Last 24 Hrs  T(C): 36.7 (28 Sep 2022 12:39), Max: 36.9 (27 Sep 2022 21:09)  T(F): 98 (28 Sep 2022 12:39), Max: 98.5 (27 Sep 2022 21:09)  HR: 79 (28 Sep 2022 12:39) (71 - 81)  BP: 144/66 (28 Sep 2022 12:39) (134/73 - 154/88)  BP(mean): --  ABP: --  ABP(mean): --  RR: 18 (28 Sep 2022 12:39) (15 - 20)  SpO2: 100% (28 Sep 2022 12:39) (96% - 100%)    O2 Parameters below as of 28 Sep 2022 12:39  Patient On (Oxygen Delivery Method): nasal cannula  O2 Flow (L/min): 2            CAPILLARY BLOOD GLUCOSE      POCT Blood Glucose.: 192 mg/dL (28 Sep 2022 13:07)      PHYSICAL EXAM:  General: awake and alert, nontoxic appearing comfortable sitting  HEENT: NC/AT, EOMI b/l, conjunctiva normal, MMM  Lymph Nodes: no cervical LAD  Neck: supple. full range of motion  Respiratory: crackles on b/l lung fields, no w/r/c, appears comfortable on 2L NC, no conversational dyspnea or accessory muscle use  Cardiovascular: S1 S2 present, RRR, no m/r/g  Abdomen: soft, NT/ND, +BS  : (+) francisco catheter present  Extremities: no c/c/e  Skin: no rashes or lesions noted  Neurological: AAOx3, no focal deficits  Psychiatry: calm, cooperative    LINES:     HOSPITAL MEDICATIONS:  Standing Meds:      PRN Meds:      LABS:                        13.1   9.61  )-----------( 178      ( 28 Sep 2022 02:13 )             41.3     Hgb Trend: 13.1<--      133<L>  |  94<L>  |  13  ----------------------------<  94  4.2   |  29  |  0.31<L>    Ca    9.7      28 Sep 2022 02:13    TPro  6.7  /  Alb  3.3  /  TBili  0.2  /  DBili  x   /  AST  31  /  ALT  32  /  AlkPhos  150<H>      Creatinine Trend: 0.31<--    Urinalysis Basic - ( 28 Sep 2022 07:59 )    Color: Light Yellow / Appearance: Clear / S.024 / pH: x  Gluc: x / Ketone: Negative  / Bili: Negative / Urobili: <2 mg/dL   Blood: x / Protein: Trace / Nitrite: Positive   Leuk Esterase: Negative / RBC: 3 /HPF / WBC 1 /HPF   Sq Epi: x / Non Sq Epi: 2 /HPF / Bacteria: Negative        Venous Blood Gas:   @ 02:13  7.35/66/58/36/88.3  VBG Lactate: 0.8      MICROBIOLOGY:       RADIOLOGY:  [ ] Reviewed and interpreted by me    PULMONARY FUNCTION TESTS:    EKG: Pulmonary Resident Consult Note    CHIEF COMPLAINT: Abdominal pain    HPI:  67F w/ Primary Ciliary Dyskinesia, Chronic hyoxemic/hypercapneic Respiratory Failure on 2-3L Home O2 NC secondary to Bronchiectasis on IVAPS, Cirrhosis, Prior CBD stricture s/p dilation, stent placement and removal, HCV Ab+, presented to the ED for abdominal pain. Pt complaining of abdominal pain during examination. Pain is located in epigastric region. Does not radiate. It is constant. Made worse with movement. Pt called her son who provided history. Pt son states that pain started 2 weeks ago. However, he noticed that her sputum production has increased beyond her baseline over the past few days. Patient is chronically SOB. She denies nausea, vomiting, fevers. Pt states she's been constipated for 5 days. Francisco catheter placed in the ED for urinary retention 1L.    As per outpt record review, pt tried on inhaled amikacin but was not able to tolerate it due to nausea and shaking. She was referred for transplant workup but refused.      PAST MEDICAL & SURGICAL HISTORY:  Bronchiectasis  Hypertension  Vitamin D deficiency  Microcytic anemia  GERD (gastroesophageal reflux disease)  Postnasal drip  Pseudomonas aeruginosa colonization  Allergic rhinitis  Recurrent pneumonia  Type 2 diabetes mellitus, with long-term current use of insulin  Anxiety and depression  History of cholecystectomy  S/P dilatation of esophageal stricture          FAMILY HISTORY:  Family history of diabetes mellitus  father    Family history of allergic rhinitis (Child, Child)  son, daughter    Family history of bronchitis  mother, father        SOCIAL HISTORY:  Smoking: Denies  Substance Use: Denies  EtOH Use: Denies  Marital Status: [ ] Single [ ]  [ ]  [ ]   Sexual History:   Occupation:  Recent Travel:  Country of Birth:  Advance Directives:    Allergies    No Known Allergies    Intolerances        HOME MEDICATIONS:  Admelog SoloStar 100 units/mL injectable solution: 6 unit(s) injectable 3 times a day (before meals) (2022 18:29)  bisacodyl 5 mg oral delayed release tablet: 1 tab(s) orally 2 times a day (2022 18:29)  Calcium 600+D 600 mg-5 mcg (200 intl units) oral tablet: 1 tab(s) orally 2 times a day (2022 18:29)  Effexor XR 37.5 mg oral capsule, extended release: 1 cap(s) orally once a day (2022 15:05)  famotidine 40 mg oral tablet: 1 tab(s) orally once a day (at bedtime) (2022 18:29)  ferrous gluconate 324 mg (37.5 mg elemental iron) oral tablet: 1 tab(s) orally 2 times a day (2022 18:29)  Flonase 50 mcg/inh nasal spray: 1 spray(s) in each nostril once a day (2022 18:29)  ketotifen 0.025% ophthalmic solution: 1 drop(s) to each affected eye 2 times a day (2022 18:29)  losartan 50 mg oral tablet: 1 tab(s) orally once a day (2022 18:29)  metoprolol succinate 50 mg oral tablet, extended release: 1 tab(s) orally once a day (2022 18:29)  mirtazapine 7.5 mg oral tablet: 1 tab(s) orally once a day (at bedtime) (2022 18:29)  montelukast 10 mg oral tablet: 1 tab(s) orally once a day (2022 18:29)  omeprazole 20 mg oral delayed release capsule: 1 cap(s) orally once a day (2022 18:29)  ProAir HFA 90 mcg/inh inhalation aerosol: 2 puff(s) inhaled every 6 hours, As Needed (2022 18:29)  sennosides-docusate 8.6 mg-50 mg oral tablet: 2 tab(s) orally once a day (at bedtime) (2022 18:29)  Spiriva 18 mcg inhalation capsule: 1 cap(s) inhaled once a day (2022 18:29)  Symbicort 160 mcg-4.5 mcg/inh inhalation aerosol: 2 puff(s) inhaled 2 times a day (2022 18:29)      REVIEW OF SYSTEMS:  Constitutional: [x] negative [ ] fevers [ ] chills [ ] weight loss [ ] weight gain  HEENT: [x] negative [ ] dry eyes [ ] eye irritation [ ] postnasal drip [ ] nasal congestion  CV: [x] negative  [ ] chest pain [ ] orthopnea [ ] palpitations [ ] murmur  Resp: [ ] negative [x] cough [x] shortness of breath [ ] wheezing [x] sputum [ ] hemoptysis  GI: [ ] negative [ ] nausea [ ] vomiting [ ] diarrhea [x] constipation [x] abd pain [ ] dysphagia   : [x] negative [ ] dysuria [ ] nocturia [ ] hematuria [ ] increased urinary frequency  Musculoskeletal: [x] negative [ ] back pain [ ] myalgias [ ] arthralgias [ ] fracture  Skin: [x] negative [ ] rash [ ] itch  Neurological: [x] negative [ ] headache [ ] dizziness [ ] syncope [ ] weakness [ ] numbness  Psychiatric: [x] negative [ ] anxiety [ ] depression  Endocrine: [x] negative [ ] diabetes [ ] thyroid problem  Hematologic/Lymphatic: [ ] negative [ ] anemia [ ] bleeding problem  Allergic/Immunologic: [ ] negative [ ] itchy eyes [ ] nasal discharge [ ] hives [ ] angioedema  [x] All other systems negative  [ ] Unable to assess ROS because ________    OBJECTIVE:  ICU Vital Signs Last 24 Hrs  T(C): 36.7 (28 Sep 2022 12:39), Max: 36.9 (27 Sep 2022 21:09)  T(F): 98 (28 Sep 2022 12:39), Max: 98.5 (27 Sep 2022 21:09)  HR: 79 (28 Sep 2022 12:39) (71 - 81)  BP: 144/66 (28 Sep 2022 12:39) (134/73 - 154/88)  BP(mean): --  ABP: --  ABP(mean): --  RR: 18 (28 Sep 2022 12:39) (15 - 20)  SpO2: 100% (28 Sep 2022 12:39) (96% - 100%)    O2 Parameters below as of 28 Sep 2022 12:39  Patient On (Oxygen Delivery Method): nasal cannula  O2 Flow (L/min): 2            CAPILLARY BLOOD GLUCOSE      POCT Blood Glucose.: 192 mg/dL (28 Sep 2022 13:07)      PHYSICAL EXAM:  General: awake and alert, nontoxic appearing, appears uncomfortable  HEENT: NC/AT, EOMI b/l, conjunctiva normal, MMM  Lymph Nodes: No cervical LAD  Neck: supple. full range of motion  Respiratory: crackles on b/l lung fields posteriorly, no wheezing, no use of accessory muscles  Cardiovascular: S1 S2 present, RRR, no murmur, no edema  Abdomen: Soft, Nondistended, holding stomach during exam due to pain  : (+) francisco catheter present  Extremities: no c/c/e  Skin: no rashes or lesions noted  Neurological: AAOx3, no focal deficits  Psychiatry: calm, cooperative    LINES:     HOSPITAL MEDICATIONS:  MEDICATIONS  (STANDING):  acetaminophen     Tablet .. 500 milliGRAM(s) Oral every 6 hours  amLODIPine   Tablet 5 milliGRAM(s) Oral daily  budesonide 160 MICROgram(s)/formoterol 4.5 MICROgram(s) Inhaler 2 Puff(s) Inhalation two times a day  dextrose 5%. 1000 milliLiter(s) (50 mL/Hr) IV Continuous <Continuous>  dextrose 5%. 1000 milliLiter(s) (100 mL/Hr) IV Continuous <Continuous>  dextrose 50% Injectable 25 Gram(s) IV Push once  dextrose 50% Injectable 12.5 Gram(s) IV Push once  dextrose 50% Injectable 25 Gram(s) IV Push once  enoxaparin Injectable 30 milliGRAM(s) SubCutaneous every 24 hours  ferrous    sulfate 325 milliGRAM(s) Oral daily  fluticasone propionate 50 MICROgram(s)/spray Nasal Spray 1 Spray(s) Both Nostrils two times a day  glucagon  Injectable 1 milliGRAM(s) IntraMuscular once  insulin glargine Injectable (LANTUS) 12 Unit(s) SubCutaneous at bedtime  insulin lispro (ADMELOG) corrective regimen sliding scale   SubCutaneous three times a day before meals  insulin lispro (ADMELOG) corrective regimen sliding scale   SubCutaneous at bedtime  insulin lispro Injectable (ADMELOG) 7 Unit(s) SubCutaneous three times a day before meals  lidocaine/prilocaine Cream 1 Application(s) Topical daily  metoprolol succinate ER 50 milliGRAM(s) Oral daily  montelukast 10 milliGRAM(s) Oral daily  nystatin    Suspension 051263 Unit(s) Oral daily  pantoprazole    Tablet 40 milliGRAM(s) Oral before breakfast  piperacillin/tazobactam IVPB.- 3.375 Gram(s) IV Intermittent once  senna 2 Tablet(s) Oral at bedtime  tiotropium 18 MICROgram(s) Capsule 1 Capsule(s) Inhalation daily  venlafaxine XR. 37.5 milliGRAM(s) Oral daily    MEDICATIONS  (PRN):  ALBUTerol    90 MICROgram(s) HFA Inhaler 2 Puff(s) Inhalation every 6 hours PRN Shortness of Breath and/or Wheezing  dextrose Oral Gel 15 Gram(s) Oral once PRN Blood Glucose LESS THAN 70 milliGRAM(s)/deciliter  ketorolac   Injectable 15 milliGRAM(s) IV Push every 8 hours PRN Severe Pain (7 - 10)        LABS:                        13.1   9.61  )-----------( 178      ( 28 Sep 2022 02:13 )             41.3     Hgb Trend: 13.1<--      133<L>  |  94<L>  |  13  ----------------------------<  94  4.2   |  29  |  0.31<L>    Ca    9.7      28 Sep 2022 02:13    TPro  6.7  /  Alb  3.3  /  TBili  0.2  /  DBili  x   /  AST  31  /  ALT  32  /  AlkPhos  150<H>      Creatinine Trend: 0.31<--    Urinalysis Basic - ( 28 Sep 2022 07:59 )    Color: Light Yellow / Appearance: Clear / S.024 / pH: x  Gluc: x / Ketone: Negative  / Bili: Negative / Urobili: <2 mg/dL   Blood: x / Protein: Trace / Nitrite: Positive   Leuk Esterase: Negative / RBC: 3 /HPF / WBC 1 /HPF   Sq Epi: x / Non Sq Epi: 2 /HPF / Bacteria: Negative        Venous Blood Gas:   @ 02:13  7.35/66/58/36/88.3  VBG Lactate: 0.8      MICROBIOLOGY:       RADIOLOGY:  [x] Reviewed and interpreted by me  CT Chest - diffuse bronchiectasis, multiple subcentimeter nodules, peripheral tree in bud    PULMONARY FUNCTION TESTS:  Outpatient PFT 2022 - severe restrictive ventilatory impairment with reduced DLCO   EKG:

## 2022-09-29 LAB
A1C WITH ESTIMATED AVERAGE GLUCOSE RESULT: 9.4 % — HIGH (ref 4–5.6)
ALBUMIN SERPL ELPH-MCNC: 3 G/DL — LOW (ref 3.3–5)
ALP SERPL-CCNC: 143 U/L — HIGH (ref 40–120)
ALT FLD-CCNC: 35 U/L — HIGH (ref 4–33)
ANION GAP SERPL CALC-SCNC: 8 MMOL/L — SIGNIFICANT CHANGE UP (ref 7–14)
AST SERPL-CCNC: 42 U/L — HIGH (ref 4–32)
BILIRUB SERPL-MCNC: 0.3 MG/DL — SIGNIFICANT CHANGE UP (ref 0.2–1.2)
BUN SERPL-MCNC: 10 MG/DL — SIGNIFICANT CHANGE UP (ref 7–23)
CALCIUM SERPL-MCNC: 9.1 MG/DL — SIGNIFICANT CHANGE UP (ref 8.4–10.5)
CHLORIDE SERPL-SCNC: 92 MMOL/L — LOW (ref 98–107)
CO2 SERPL-SCNC: 32 MMOL/L — HIGH (ref 22–31)
CREAT SERPL-MCNC: 0.33 MG/DL — LOW (ref 0.5–1.3)
CULTURE RESULTS: NO GROWTH — SIGNIFICANT CHANGE UP
EGFR: 115 ML/MIN/1.73M2 — SIGNIFICANT CHANGE UP
ESTIMATED AVERAGE GLUCOSE: 223 — SIGNIFICANT CHANGE UP
GLUCOSE BLDC GLUCOMTR-MCNC: 147 MG/DL — HIGH (ref 70–99)
GLUCOSE BLDC GLUCOMTR-MCNC: 158 MG/DL — HIGH (ref 70–99)
GLUCOSE BLDC GLUCOMTR-MCNC: 178 MG/DL — HIGH (ref 70–99)
GLUCOSE BLDC GLUCOMTR-MCNC: 340 MG/DL — HIGH (ref 70–99)
GLUCOSE BLDC GLUCOMTR-MCNC: 347 MG/DL — HIGH (ref 70–99)
GLUCOSE BLDC GLUCOMTR-MCNC: 49 MG/DL — CRITICAL LOW (ref 70–99)
GLUCOSE BLDC GLUCOMTR-MCNC: 53 MG/DL — CRITICAL LOW (ref 70–99)
GLUCOSE BLDC GLUCOMTR-MCNC: 58 MG/DL — LOW (ref 70–99)
GLUCOSE BLDC GLUCOMTR-MCNC: 81 MG/DL — SIGNIFICANT CHANGE UP (ref 70–99)
GLUCOSE BLDC GLUCOMTR-MCNC: 97 MG/DL — SIGNIFICANT CHANGE UP (ref 70–99)
GLUCOSE SERPL-MCNC: 145 MG/DL — HIGH (ref 70–99)
HCT VFR BLD CALC: 38.5 % — SIGNIFICANT CHANGE UP (ref 34.5–45)
HGB BLD-MCNC: 12.4 G/DL — SIGNIFICANT CHANGE UP (ref 11.5–15.5)
MAGNESIUM SERPL-MCNC: 1.9 MG/DL — SIGNIFICANT CHANGE UP (ref 1.6–2.6)
MCHC RBC-ENTMCNC: 27.6 PG — SIGNIFICANT CHANGE UP (ref 27–34)
MCHC RBC-ENTMCNC: 32.2 GM/DL — SIGNIFICANT CHANGE UP (ref 32–36)
MCV RBC AUTO: 85.7 FL — SIGNIFICANT CHANGE UP (ref 80–100)
NRBC # BLD: 0 /100 WBCS — SIGNIFICANT CHANGE UP (ref 0–0)
NRBC # FLD: 0 K/UL — SIGNIFICANT CHANGE UP (ref 0–0)
PHOSPHATE SERPL-MCNC: 3.1 MG/DL — SIGNIFICANT CHANGE UP (ref 2.5–4.5)
PLATELET # BLD AUTO: 154 K/UL — SIGNIFICANT CHANGE UP (ref 150–400)
POTASSIUM SERPL-MCNC: 3.5 MMOL/L — SIGNIFICANT CHANGE UP (ref 3.5–5.3)
POTASSIUM SERPL-SCNC: 3.5 MMOL/L — SIGNIFICANT CHANGE UP (ref 3.5–5.3)
PROT SERPL-MCNC: 5.9 G/DL — LOW (ref 6–8.3)
RBC # BLD: 4.49 M/UL — SIGNIFICANT CHANGE UP (ref 3.8–5.2)
RBC # FLD: 13.1 % — SIGNIFICANT CHANGE UP (ref 10.3–14.5)
SODIUM SERPL-SCNC: 132 MMOL/L — LOW (ref 135–145)
SPECIMEN SOURCE: SIGNIFICANT CHANGE UP
WBC # BLD: 8.57 K/UL — SIGNIFICANT CHANGE UP (ref 3.8–10.5)
WBC # FLD AUTO: 8.57 K/UL — SIGNIFICANT CHANGE UP (ref 3.8–10.5)

## 2022-09-29 PROCEDURE — 99233 SBSQ HOSP IP/OBS HIGH 50: CPT | Mod: GC

## 2022-09-29 PROCEDURE — 99255 IP/OBS CONSLTJ NEW/EST HI 80: CPT

## 2022-09-29 RX ORDER — DEXTROSE 50 % IN WATER 50 %
25 SYRINGE (ML) INTRAVENOUS ONCE
Refills: 0 | Status: COMPLETED | OUTPATIENT
Start: 2022-09-29 | End: 2022-09-29

## 2022-09-29 RX ORDER — IPRATROPIUM/ALBUTEROL SULFATE 18-103MCG
3 AEROSOL WITH ADAPTER (GRAM) INHALATION ONCE
Refills: 0 | Status: COMPLETED | OUTPATIENT
Start: 2022-09-29 | End: 2022-09-29

## 2022-09-29 RX ORDER — INSULIN LISPRO 100/ML
VIAL (ML) SUBCUTANEOUS
Refills: 0 | Status: DISCONTINUED | OUTPATIENT
Start: 2022-09-29 | End: 2022-10-04

## 2022-09-29 RX ORDER — IPRATROPIUM/ALBUTEROL SULFATE 18-103MCG
3 AEROSOL WITH ADAPTER (GRAM) INHALATION EVERY 6 HOURS
Refills: 0 | Status: DISCONTINUED | OUTPATIENT
Start: 2022-09-29 | End: 2022-10-10

## 2022-09-29 RX ORDER — INSULIN GLARGINE 100 [IU]/ML
9 INJECTION, SOLUTION SUBCUTANEOUS AT BEDTIME
Refills: 0 | Status: DISCONTINUED | OUTPATIENT
Start: 2022-09-29 | End: 2022-09-30

## 2022-09-29 RX ORDER — SODIUM CHLORIDE 9 MG/ML
4 INJECTION INTRAMUSCULAR; INTRAVENOUS; SUBCUTANEOUS EVERY 8 HOURS
Refills: 0 | Status: DISCONTINUED | OUTPATIENT
Start: 2022-09-29 | End: 2022-10-10

## 2022-09-29 RX ORDER — INSULIN LISPRO 100/ML
4 VIAL (ML) SUBCUTANEOUS
Refills: 0 | Status: DISCONTINUED | OUTPATIENT
Start: 2022-09-29 | End: 2022-09-30

## 2022-09-29 RX ORDER — INFLUENZA VIRUS VACCINE 15; 15; 15; 15 UG/.5ML; UG/.5ML; UG/.5ML; UG/.5ML
0.7 SUSPENSION INTRAMUSCULAR ONCE
Refills: 0 | Status: DISCONTINUED | OUTPATIENT
Start: 2022-09-29 | End: 2022-10-10

## 2022-09-29 RX ORDER — PIPERACILLIN AND TAZOBACTAM 4; .5 G/20ML; G/20ML
3.38 INJECTION, POWDER, LYOPHILIZED, FOR SOLUTION INTRAVENOUS EVERY 8 HOURS
Refills: 0 | Status: DISCONTINUED | OUTPATIENT
Start: 2022-09-29 | End: 2022-09-29

## 2022-09-29 RX ORDER — MEROPENEM 1 G/30ML
1000 INJECTION INTRAVENOUS EVERY 8 HOURS
Refills: 0 | Status: DISCONTINUED | OUTPATIENT
Start: 2022-09-29 | End: 2022-10-03

## 2022-09-29 RX ORDER — LOSARTAN POTASSIUM 100 MG/1
1 TABLET, FILM COATED ORAL
Qty: 0 | Refills: 0 | DISCHARGE

## 2022-09-29 RX ORDER — INSULIN LISPRO 100/ML
VIAL (ML) SUBCUTANEOUS AT BEDTIME
Refills: 0 | Status: DISCONTINUED | OUTPATIENT
Start: 2022-09-29 | End: 2022-10-10

## 2022-09-29 RX ORDER — SIMETHICONE 80 MG/1
80 TABLET, CHEWABLE ORAL
Refills: 0 | Status: DISCONTINUED | OUTPATIENT
Start: 2022-09-29 | End: 2022-10-03

## 2022-09-29 RX ADMIN — MEROPENEM 100 MILLIGRAM(S): 1 INJECTION INTRAVENOUS at 22:38

## 2022-09-29 RX ADMIN — SENNA PLUS 2 TABLET(S): 8.6 TABLET ORAL at 22:39

## 2022-09-29 RX ADMIN — Medication 7 UNIT(S): at 08:29

## 2022-09-29 RX ADMIN — Medication 500 MILLIGRAM(S): at 14:50

## 2022-09-29 RX ADMIN — MEROPENEM 100 MILLIGRAM(S): 1 INJECTION INTRAVENOUS at 14:50

## 2022-09-29 RX ADMIN — BUDESONIDE AND FORMOTEROL FUMARATE DIHYDRATE 2 PUFF(S): 160; 4.5 AEROSOL RESPIRATORY (INHALATION) at 14:12

## 2022-09-29 RX ADMIN — Medication 7 UNIT(S): at 18:00

## 2022-09-29 RX ADMIN — BUDESONIDE AND FORMOTEROL FUMARATE DIHYDRATE 2 PUFF(S): 160; 4.5 AEROSOL RESPIRATORY (INHALATION) at 22:51

## 2022-09-29 RX ADMIN — Medication 500000 UNIT(S): at 14:13

## 2022-09-29 RX ADMIN — Medication 50 MILLIGRAM(S): at 07:06

## 2022-09-29 RX ADMIN — LIDOCAINE AND PRILOCAINE CREAM 1 APPLICATION(S): 25; 25 CREAM TOPICAL at 14:13

## 2022-09-29 RX ADMIN — AMLODIPINE BESYLATE 5 MILLIGRAM(S): 2.5 TABLET ORAL at 07:07

## 2022-09-29 RX ADMIN — Medication 37.5 MILLIGRAM(S): at 14:12

## 2022-09-29 RX ADMIN — Medication 25 GRAM(S): at 12:02

## 2022-09-29 RX ADMIN — PIPERACILLIN AND TAZOBACTAM 25 GRAM(S): 4; .5 INJECTION, POWDER, LYOPHILIZED, FOR SOLUTION INTRAVENOUS at 02:15

## 2022-09-29 RX ADMIN — Medication 1 SPRAY(S): at 18:00

## 2022-09-29 RX ADMIN — Medication 3 MILLILITER(S): at 12:37

## 2022-09-29 RX ADMIN — Medication 7 UNIT(S): at 13:12

## 2022-09-29 RX ADMIN — Medication 8: at 13:12

## 2022-09-29 RX ADMIN — Medication 2: at 18:01

## 2022-09-29 RX ADMIN — ENOXAPARIN SODIUM 30 MILLIGRAM(S): 100 INJECTION SUBCUTANEOUS at 14:13

## 2022-09-29 RX ADMIN — Medication 1 SPRAY(S): at 07:06

## 2022-09-29 RX ADMIN — MONTELUKAST 10 MILLIGRAM(S): 4 TABLET, CHEWABLE ORAL at 14:12

## 2022-09-29 RX ADMIN — TIOTROPIUM BROMIDE 1 CAPSULE(S): 18 CAPSULE ORAL; RESPIRATORY (INHALATION) at 14:13

## 2022-09-29 RX ADMIN — PANTOPRAZOLE SODIUM 40 MILLIGRAM(S): 20 TABLET, DELAYED RELEASE ORAL at 07:06

## 2022-09-29 RX ADMIN — Medication 3 MILLILITER(S): at 23:28

## 2022-09-29 RX ADMIN — INSULIN GLARGINE 9 UNIT(S): 100 INJECTION, SOLUTION SUBCUTANEOUS at 23:52

## 2022-09-29 RX ADMIN — Medication 500 MILLIGRAM(S): at 07:06

## 2022-09-29 RX ADMIN — Medication 325 MILLIGRAM(S): at 14:14

## 2022-09-29 NOTE — PROGRESS NOTE ADULT - ASSESSMENT
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 Pulmonary consulted for co-management of bronchiectasis. RVP negative. Pt follows with Dr. Young as outpt. Pt with multiple admissions for bronchiectasis in the past.    #Bronchiectasis Exacerbation  #Chronic hypoxemic and hypercapnic respiratory Failure    Pt appears comfortable on 2L NC, saturating 97%. In setting of increased sputum production, it is reasonable to start antibiotics. Multiple sputum cultures in the past with pseudomonas aeruginosa (sensitive to geoff and zosyn in 8/23/2022), klebsiella and elizabethkingia meningoseptica. She has tried both inhaled amikacin and tobramycin in the past and was unable to tolerate it due to nausea and tremulousness. Outpt workup for cystic fibrosis negative. Aspergillus igg negative in the past.    Recommendations:  Continue home inhalers - albuterol, spiriva, symbicort  C/w home airway clearance regimen: hypersal, aerobica  Chest PT  ID Consult for abx co-management - will likely need systemic abx +/- inhaled (if able to tolerate)    c/w home O2   c/w NIPPV qhs at her home settings. Last settings documented at [facility]:  AVAPS RR 18, EPAP 5, Min IP 15, Max IP 25,       ***NOTE INCOMPLETE UNTIL ATTENDING ATTESTATION DONE***   67F w/ Primary Ciliary Dyskinesia, Chronic hyoxemic/hypercapneic Respiratory Failure on 2-3L Home O2 NC secondary to Bronchiectasis on AVAPS, Cirrhosis, Prior CBD stricture s/p dilation, stent placement and removal, HCV Ab+, presented to the ED for abdominal pain found to have punctate CBD stones. Pulmonary consulted for co-management of bronchiectasis. RVP negative. Pt follows with Dr. Young as outpt.     #Bronchiectasis Exacerbation  #Chronic hypoxemic and hypercapnic respiratory Failure    Multiple sputum cultures in the past with pseudomonas aeruginosa (sensitive to geoff and zosyn in 8/23/2022), klebsiella and elizabethkingia meningoseptica.  She has tried both inhaled amikacin and tobramycin in the past and was unable to tolerate it due to nausea and tremulousness.   Outpt workup for cystic fibrosis negative.   Aspergillus igg negative in the past.  sputum culture growing PsA    Recommendations:  Continue home inhalers - albuterol, spiriva, symbicort  C/w home airway clearance regimen: hypersal, aerobica, chest vest  Chest PT  ID Consult for abx co-management - will likely need systemic abx +/- inhaled (if able to tolerate)    c/w home O2   c/w NIPPV qhs at her home settings. Last settings documented at [facility]:  AVAPS RR 18, EPAP 5, Min IP 15, Max IP 25,     Jose Chandra MD  P[facility] PGY4  [facility] [id number], [facility] [phone]

## 2022-09-29 NOTE — PROVIDER CONTACT NOTE (HYPOGLYCEMIA EVENT) - NS PROVIDER CONTACT NOTE-TREATMENT-HYPO
09/29/22 (11:55) Initially patient offered apple juice; pt refused at this time (12:15)later patient complained of feeling thirsty; 4 oz of apple juice given/4 oz Fruit Juice (Specify quantity, date/time)/Dextrose 50% 25 Grams IV Push

## 2022-09-29 NOTE — DIETITIAN INITIAL EVALUATION ADULT - ORAL INTAKE PTA/DIET HISTORY
Patient reports generally her appetite is fair, consumes ~75% of her meals. States her appetite has gradually decreased for the past 3 mos in setting of abdominal pain. Patient only consume 2 meals daily most of the time. Patient's condition got worse recently with nausea and vomiting for multiple times at home 1wk PTA. Of note, Pt reported 45kg of wt loss over past 2 years.

## 2022-09-29 NOTE — PROGRESS NOTE ADULT - SUBJECTIVE AND OBJECTIVE BOX
Progress Note     == draft in progress ==  Nehal Alba  PGY-1 Medicine  Teams | [id number]    Patient is a 67y old  Female who presents with a chief complaint of abdominal pain (28 Sep 2022 16:29)          SUBJECTIVE / INTERVAL EVENTS         MEDICATIONS    Home Medications:  Admelog SoloStar 100 units/mL injectable solution: 7 unit(s) injectable 3 times a day (before meals) (28 Sep 2022 18:32)  ALBUTEROL SULFATE (2.5 MG/3ML)0.083% NEBULIZER: USE 1 UNIT DOSE EVERY 4-6 HOURS AS NEEDED FOR WHEEZING . (28 Sep 2022 18:32)  AMLODIPINE BESYLATE 5MG TABLET: TAKE 1 TABLET (5 MG) BY MOUTH DAILY FOR HIGH BLOOD PRESSURE (28 Sep 2022 18:32)  bisacodyl 5 mg oral delayed release tablet: 1 tab(s) orally 2 times a day (28 Sep 2022 18:32)  Calcium 600+D 600 mg-5 mcg (200 intl units) oral tablet: 1 tab(s) orally 2 times a day (28 Sep 2022 18:32)  CYANOCOBALAMIN 1000MCG/ML SOLUTION: ADMINISTER 1 MILLILITER (1,000 MCG) SUBCUTANEOUS EVERY 7 DAYS (28 Sep 2022 18:32)  Effexor XR 37.5 mg oral capsule, extended release: 1 cap(s) orally once a day (28 Sep 2022 18:32)  famotidine 40 mg oral tablet: 1 tab(s) orally once a day (at bedtime) (28 Sep 2022 18:32)  FERROUS GLUCONATE 324 (38 FE)MG TABLET: TAKE 1 TABLET BY MOUTH 2 TIMES PER DAY BETWEEN MEALS IN WATER OR JUICE (28 Sep 2022 18:32)  Flonase 50 mcg/inh nasal spray: 1 spray(s) in each nostril once a day (28 Sep 2022 18:32)  GNP GAS RELIEF 80 MG ORAL TABLET CHEWABLE: TAKE 1 TABLET BY MOUTH 4 TIMES PER DAY AFTER MEALS AND AT BEDTIME AS NEEDED FOR GAS (28 Sep 2022 18:32)  HYDROCORTISONE 2.5% CREAM: APPLY RECTALLY TWICE A DAY (28 Sep 2022 18:32)  ketotifen 0.025% ophthalmic solution: 1 drop(s) to each affected eye 2 times a day (28 Sep 2022 18:32)  Lantus 100 units/mL subcutaneous solution: 15 unit(s) subcutaneous once a day (at bedtime) (28 Sep 2022 21:59)  LIDOCAINE/PRILOCAINE 2.5-2.5% CRM: APPLY RECTALLY TWICE A DAY (28 Sep 2022 18:32)  LORAZEPAM 2 MG ORAL TABLET: TAKE 1 TABLET (2 MG) BY MOUTH DAILY AT BEDTIME AS NEEDED FOR TROUBLE SLEEPING (28 Sep 2022 18:32)  losartan 50 mg oral tablet: 1 tab(s) orally once a day (28 Sep 2022 18:32)  MELOXICAM 7.5MG TABLET: TAKE 1 TABLET (7.5 MG) BY MOUTH DAILY (28 Sep 2022 18:32)  metoprolol succinate 50 mg oral tablet, extended release: 1 tab(s) orally once a day (28 Sep 2022 18:32)  mirtazapine 7.5 mg oral tablet: 1 tab(s) orally once a day (at bedtime) (28 Sep 2022 18:32)  MONTELUKAST SODIUM 10MG TABLET: TAKE 1 TABLET (10 MG) BY MOUTH DAILY (28 Sep 2022 18:32)  OMEPRAZOLE-SODIUM BICARBONATE 40-1680MG PACK: 1 PACKET ORALLY DAILY MIXED WITH 5 TO 10 ML OF WATER ON AN EMPTY STOMACH (28 Sep 2022 18:32)  ProAir HFA 90 mcg/inh inhalation aerosol: 2 puff(s) inhaled every 6 hours, As Needed (28 Sep 2022 18:32)  sennosides-docusate 8.6 mg-50 mg oral tablet: 2 tab(s) orally once a day (at bedtime) (28 Sep 2022 18:32)  Spiriva 18 mcg inhalation capsule: 1 cap(s) inhaled once a day (28 Sep 2022 18:32)  SYMBICORT 160-4.5MCG INHA: INHALE 2 PUFFS BY MOUTH 2 TIMES PER DAY (28 Sep 2022 18:32)  VENLAFAXINE HCL ER 37.5MG EXT-RELEASE CAPSULE: TAKE 1 CAPSULE (37.5 MG) BY MOUTH DAILY WITH FOOD (28 Sep 2022 18:32)      MEDICATIONS  (STANDING):  acetaminophen     Tablet .. 500 milliGRAM(s) Oral every 6 hours  amLODIPine   Tablet 5 milliGRAM(s) Oral daily  budesonide 160 MICROgram(s)/formoterol 4.5 MICROgram(s) Inhaler 2 Puff(s) Inhalation two times a day  dextrose 5%. 1000 milliLiter(s) (50 mL/Hr) IV Continuous <Continuous>  dextrose 5%. 1000 milliLiter(s) (100 mL/Hr) IV Continuous <Continuous>  dextrose 50% Injectable 25 Gram(s) IV Push once  dextrose 50% Injectable 12.5 Gram(s) IV Push once  dextrose 50% Injectable 25 Gram(s) IV Push once  enoxaparin Injectable 30 milliGRAM(s) SubCutaneous every 24 hours  ferrous    sulfate 325 milliGRAM(s) Oral daily  fluticasone propionate 50 MICROgram(s)/spray Nasal Spray 1 Spray(s) Both Nostrils two times a day  glucagon  Injectable 1 milliGRAM(s) IntraMuscular once  influenza  Vaccine (HIGH DOSE) 0.7 milliLiter(s) IntraMuscular once  insulin glargine Injectable (LANTUS) 12 Unit(s) SubCutaneous at bedtime  insulin lispro (ADMELOG) corrective regimen sliding scale   SubCutaneous three times a day before meals  insulin lispro (ADMELOG) corrective regimen sliding scale   SubCutaneous at bedtime  insulin lispro Injectable (ADMELOG) 7 Unit(s) SubCutaneous three times a day before meals  lidocaine/prilocaine Cream 1 Application(s) Topical daily  metoprolol succinate ER 50 milliGRAM(s) Oral daily  montelukast 10 milliGRAM(s) Oral daily  nystatin    Suspension 306041 Unit(s) Oral daily  pantoprazole    Tablet 40 milliGRAM(s) Oral before breakfast  piperacillin/tazobactam IVPB.. 3.375 Gram(s) IV Intermittent every 8 hours  senna 2 Tablet(s) Oral at bedtime  tiotropium 18 MICROgram(s) Capsule 1 Capsule(s) Inhalation daily  venlafaxine XR. 37.5 milliGRAM(s) Oral daily    MEDICATIONS  (PRN):  ALBUTerol    90 MICROgram(s) HFA Inhaler 2 Puff(s) Inhalation every 6 hours PRN Shortness of Breath and/or Wheezing  dextrose Oral Gel 15 Gram(s) Oral once PRN Blood Glucose LESS THAN 70 milliGRAM(s)/deciliter  ketorolac   Injectable 15 milliGRAM(s) IV Push every 8 hours PRN Severe Pain (7 - 10)         VITALS / I&O's  T(C): 36.4 (22 @ 00:50), Max: 37.2 (22 @ 17:15)  HR: 65 (22 @ 06:44) (63 - 88)  BP: 154/70 (22 @ 00:50) (144/66 - 166/84)  RR: 20 (22 @ 00:50) (18 - 26)  SpO2: 97% (22 @ 06:44) (94% - 100%)  I&O's Summary    28 Sep 2022 07:01  -  29 Sep 2022 07:00  --------------------------------------------------------  IN: 0 mL / OUT: 1000 mL / NET: -1000 mL           PHYSICAL EXAM           LABS                        13.1   9.61  )-----------( 178      ( 28 Sep 2022 02:13 )             41.3         133<L>  |  94<L>  |  13  ----------------------------<  94  4.2   |  29  |  0.31<L>    Ca    9.7      28 Sep 2022 02:13    TPro  6.7  /  Alb  3.3  /  TBili  0.2  /  DBili  x   /  AST  31  /  ALT  32  /  AlkPhos  150<H>      CAPILLARY BLOOD GLUCOSE      POCT Blood Glucose.: 198 mg/dL (29 Sep 2022 00:51)  POCT Blood Glucose.: 348 mg/dL (28 Sep 2022 21:20)  POCT Blood Glucose.: 296 mg/dL (28 Sep 2022 16:31)  POCT Blood Glucose.: 192 mg/dL (28 Sep 2022 13:07)      LIVER FUNCTIONS - ( 28 Sep 2022 02:13 )  Alb: 3.3 g/dL / Pro: 6.7 g/dL / ALK PHOS: 150 U/L / ALT: 32 U/L / AST: 31 U/L / GGT: x               CARDIAC MARKERS ( 28 Sep 2022 02:13 )  x     / x     / 43 U/L / x     / 1.9 ng/mL      Urinalysis Basic - ( 28 Sep 2022 07:59 )    Color: Light Yellow / Appearance: Clear / S.024 / pH: x  Gluc: x / Ketone: Negative  / Bili: Negative / Urobili: <2 mg/dL   Blood: x / Protein: Trace / Nitrite: Positive   Leuk Esterase: Negative / RBC: 3 /HPF / WBC 1 /HPF   Sq Epi: x / Non Sq Epi: 2 /HPF / Bacteria: Negative        Culture - Sputum (collected 28 Sep 2022 17:27)  Source: .Sputum Sputum  Gram Stain (28 Sep 2022 21:59):    Moderate polymorphonuclear leukocytes per low power field    No Squamous epithelial cells per low power field    Numerous Gram Negative Rods per oil power field    Rare Gram positive cocci in pairs per oil power field    Culture - Urine (collected 28 Sep 2022 09:24)  Source: Clean Catch Clean Catch (Midstream)  Final Report (29 Sep 2022 07:20):    No growth             RADIOLOGY & ADDITIONAL TESTS    Imaging Personally Reviewed:     Consultant(s) Notes Reviewed:     Care Discussed with Consultants/Other Providers:     Progress Note     Nehal Parth  PGY-1 Medicine  Teams | [id number]    Patient is a 67y old  Female who presents with a chief complaint of abdominal pain (28 Sep 2022 16:29)          SUBJECTIVE / INTERVAL EVENTS  Yesterday evening, patient seen with attending and HS4, [employer] #[id number]. Patient endorsed pain control. She had received acetaminophen.  This morning, seen while pulmonology also at bedside; patient had endorsed increased shortness of breath and not receiving hypersal/aerobica overnight; son had endorsed patient having poorly tolerated tobramycin and amikacin in the past. Patient independently called daughter to translate; patient's abdominal pain was 5/10.  Patient had hypoglycemic episode, was feeling anxious, fingerstick 49, repeat 53. Given juice, dextrose. Patient symptomatically improved. Next fingerstick and repeat 340s, prior to premeal insulin.       MEDICATIONS    Home Medications:  Admelog SoloStar 100 units/mL injectable solution: 7 unit(s) injectable 3 times a day (before meals) (28 Sep 2022 18:32)  ALBUTEROL SULFATE (2.5 MG/3ML)0.083% NEBULIZER: USE 1 UNIT DOSE EVERY 4-6 HOURS AS NEEDED FOR WHEEZING . (28 Sep 2022 18:32)  AMLODIPINE BESYLATE 5MG TABLET: TAKE 1 TABLET (5 MG) BY MOUTH DAILY FOR HIGH BLOOD PRESSURE (28 Sep 2022 18:32)  bisacodyl 5 mg oral delayed release tablet: 1 tab(s) orally 2 times a day (28 Sep 2022 18:32)  Calcium 600+D 600 mg-5 mcg (200 intl units) oral tablet: 1 tab(s) orally 2 times a day (28 Sep 2022 18:32)  CYANOCOBALAMIN 1000MCG/ML SOLUTION: ADMINISTER 1 MILLILITER (1,000 MCG) SUBCUTANEOUS EVERY 7 DAYS (28 Sep 2022 18:32)  Effexor XR 37.5 mg oral capsule, extended release: 1 cap(s) orally once a day (28 Sep 2022 18:32)  famotidine 40 mg oral tablet: 1 tab(s) orally once a day (at bedtime) (28 Sep 2022 18:32)  FERROUS GLUCONATE 324 (38 FE)MG TABLET: TAKE 1 TABLET BY MOUTH 2 TIMES PER DAY BETWEEN MEALS IN WATER OR JUICE (28 Sep 2022 18:32)  Flonase 50 mcg/inh nasal spray: 1 spray(s) in each nostril once a day (28 Sep 2022 18:32)  GNP GAS RELIEF 80 MG ORAL TABLET CHEWABLE: TAKE 1 TABLET BY MOUTH 4 TIMES PER DAY AFTER MEALS AND AT BEDTIME AS NEEDED FOR GAS (28 Sep 2022 18:32)  HYDROCORTISONE 2.5% CREAM: APPLY RECTALLY TWICE A DAY (28 Sep 2022 18:32)  ketotifen 0.025% ophthalmic solution: 1 drop(s) to each affected eye 2 times a day (28 Sep 2022 18:32)  Lantus 100 units/mL subcutaneous solution: 15 unit(s) subcutaneous once a day (at bedtime) (28 Sep 2022 21:59)  LIDOCAINE/PRILOCAINE 2.5-2.5% CRM: APPLY RECTALLY TWICE A DAY (28 Sep 2022 18:32)  LORAZEPAM 2 MG ORAL TABLET: TAKE 1 TABLET (2 MG) BY MOUTH DAILY AT BEDTIME AS NEEDED FOR TROUBLE SLEEPING (28 Sep 2022 18:32)  losartan 50 mg oral tablet: 1 tab(s) orally once a day (28 Sep 2022 18:32)  MELOXICAM 7.5MG TABLET: TAKE 1 TABLET (7.5 MG) BY MOUTH DAILY (28 Sep 2022 18:32)  metoprolol succinate 50 mg oral tablet, extended release: 1 tab(s) orally once a day (28 Sep 2022 18:32)  mirtazapine 7.5 mg oral tablet: 1 tab(s) orally once a day (at bedtime) (28 Sep 2022 18:32)  MONTELUKAST SODIUM 10MG TABLET: TAKE 1 TABLET (10 MG) BY MOUTH DAILY (28 Sep 2022 18:32)  OMEPRAZOLE-SODIUM BICARBONATE 40-1680MG PACK: 1 PACKET ORALLY DAILY MIXED WITH 5 TO 10 ML OF WATER ON AN EMPTY STOMACH (28 Sep 2022 18:32)  ProAir HFA 90 mcg/inh inhalation aerosol: 2 puff(s) inhaled every 6 hours, As Needed (28 Sep 2022 18:32)  sennosides-docusate 8.6 mg-50 mg oral tablet: 2 tab(s) orally once a day (at bedtime) (28 Sep 2022 18:32)  Spiriva 18 mcg inhalation capsule: 1 cap(s) inhaled once a day (28 Sep 2022 18:32)  SYMBICORT 160-4.5MCG INHA: INHALE 2 PUFFS BY MOUTH 2 TIMES PER DAY (28 Sep 2022 18:32)  VENLAFAXINE HCL ER 37.5MG EXT-RELEASE CAPSULE: TAKE 1 CAPSULE (37.5 MG) BY MOUTH DAILY WITH FOOD (28 Sep 2022 18:32)      MEDICATIONS  (STANDING):  acetaminophen     Tablet .. 500 milliGRAM(s) Oral every 6 hours  amLODIPine   Tablet 5 milliGRAM(s) Oral daily  budesonide 160 MICROgram(s)/formoterol 4.5 MICROgram(s) Inhaler 2 Puff(s) Inhalation two times a day  dextrose 5%. 1000 milliLiter(s) (50 mL/Hr) IV Continuous <Continuous>  dextrose 5%. 1000 milliLiter(s) (100 mL/Hr) IV Continuous <Continuous>  dextrose 50% Injectable 25 Gram(s) IV Push once  dextrose 50% Injectable 12.5 Gram(s) IV Push once  dextrose 50% Injectable 25 Gram(s) IV Push once  enoxaparin Injectable 30 milliGRAM(s) SubCutaneous every 24 hours  ferrous    sulfate 325 milliGRAM(s) Oral daily  fluticasone propionate 50 MICROgram(s)/spray Nasal Spray 1 Spray(s) Both Nostrils two times a day  glucagon  Injectable 1 milliGRAM(s) IntraMuscular once  influenza  Vaccine (HIGH DOSE) 0.7 milliLiter(s) IntraMuscular once  insulin glargine Injectable (LANTUS) 12 Unit(s) SubCutaneous at bedtime  insulin lispro (ADMELOG) corrective regimen sliding scale   SubCutaneous three times a day before meals  insulin lispro (ADMELOG) corrective regimen sliding scale   SubCutaneous at bedtime  insulin lispro Injectable (ADMELOG) 7 Unit(s) SubCutaneous three times a day before meals  lidocaine/prilocaine Cream 1 Application(s) Topical daily  metoprolol succinate ER 50 milliGRAM(s) Oral daily  montelukast 10 milliGRAM(s) Oral daily  nystatin    Suspension 979147 Unit(s) Oral daily  pantoprazole    Tablet 40 milliGRAM(s) Oral before breakfast  piperacillin/tazobactam IVPB.. 3.375 Gram(s) IV Intermittent every 8 hours  senna 2 Tablet(s) Oral at bedtime  tiotropium 18 MICROgram(s) Capsule 1 Capsule(s) Inhalation daily  venlafaxine XR. 37.5 milliGRAM(s) Oral daily    MEDICATIONS  (PRN):  ALBUTerol    90 MICROgram(s) HFA Inhaler 2 Puff(s) Inhalation every 6 hours PRN Shortness of Breath and/or Wheezing  dextrose Oral Gel 15 Gram(s) Oral once PRN Blood Glucose LESS THAN 70 milliGRAM(s)/deciliter  ketorolac   Injectable 15 milliGRAM(s) IV Push every 8 hours PRN Severe Pain (7 - 10)         VITALS / I&O's  T(C): 36.4 (22 @ 00:50), Max: 37.2 (22 @ 17:15)  HR: 65 (22 @ 06:44) (63 - 88)  BP: 154/70 (22 @ 00:50) (144/66 - 166/84)  RR: 20 (22 @ 00:50) (18 - 26)  SpO2: 97% (22 @ 06:44) (94% - 100%)  I&O's Summary    28 Sep 2022 07:01  -  29 Sep 2022 07:00  --------------------------------------------------------  IN: 0 mL / OUT: 1000 mL / NET: -1000 mL           PHYSICAL EXAM    General: Seated in bed, looking uncomfortable. Ill-appearing, thin.  HEENT: Normocephalic, atraumatic. Extraocular movements grossly intact. No nasal discharge.  Neuro: Alert and oriented. No facial asymmetry or dysarthria. Moving all extremities.  CV: Regular rate and rhythm. S1/S2. No murmurs, rubs, or gallops appreciated.  Respiratory: Diffuse crackles and end expiratory wheezes throughout anterior lung fields, possibly worse on the left. Increased work of breathing. Often short sentences, capable of long sentences. Wet cough.  Abdomen: Soft; tender to light palpation, all quadrants; nondistended. No rebound or guarding.  : Suprapubic region tender.  Skin: No rashes or bruising of face or forearms bilaterally.  Psych: Mood and affect appropriate.       LABS                        13.1   9.61  )-----------( 178      ( 28 Sep 2022 02:13 )             41.3         133<L>  |  94<L>  |  13  ----------------------------<  94  4.2   |  29  |  0.31<L>    Ca    9.7      28 Sep 2022 02:13    TPro  6.7  /  Alb  3.3  /  TBili  0.2  /  DBili  x   /  AST  31  /  ALT  32  /  AlkPhos  150<H>      CAPILLARY BLOOD GLUCOSE      POCT Blood Glucose.: 198 mg/dL (29 Sep 2022 00:51)  POCT Blood Glucose.: 348 mg/dL (28 Sep 2022 21:20)  POCT Blood Glucose.: 296 mg/dL (28 Sep 2022 16:31)  POCT Blood Glucose.: 192 mg/dL (28 Sep 2022 13:07)      LIVER FUNCTIONS - ( 28 Sep 2022 02:13 )  Alb: 3.3 g/dL / Pro: 6.7 g/dL / ALK PHOS: 150 U/L / ALT: 32 U/L / AST: 31 U/L / GGT: x               CARDIAC MARKERS ( 28 Sep 2022 02:13 )  x     / x     / 43 U/L / x     / 1.9 ng/mL      Urinalysis Basic - ( 28 Sep 2022 07:59 )    Color: Light Yellow / Appearance: Clear / S.024 / pH: x  Gluc: x / Ketone: Negative  / Bili: Negative / Urobili: <2 mg/dL   Blood: x / Protein: Trace / Nitrite: Positive   Leuk Esterase: Negative / RBC: 3 /HPF / WBC 1 /HPF   Sq Epi: x / Non Sq Epi: 2 /HPF / Bacteria: Negative        Culture - Sputum (collected 28 Sep 2022 17:27)  Source: .Sputum Sputum  Gram Stain (28 Sep 2022 21:59):    Moderate polymorphonuclear leukocytes per low power field    No Squamous epithelial cells per low power field    Numerous Gram Negative Rods per oil power field    Rare Gram positive cocci in pairs per oil power field    Culture - Urine (collected 28 Sep 2022 09:24)  Source: Clean Catch Clean Catch (Midstream)  Final Report (29 Sep 2022 07:20):    No growth             RADIOLOGY & ADDITIONAL TESTS    Imaging Personally Reviewed:     Consultant(s) Notes Reviewed:     Care Discussed with Consultants/Other Providers:

## 2022-09-29 NOTE — PROGRESS NOTE ADULT - ATTENDING COMMENTS
67 year old female with primary ciliary dyskinesia, bronchiectasis, and chronic hypoxemic and hypercapnic respiratory on AVAPS presents with abdominal pain and increased sputum production. Found to have choledocholithiasis which is unchanged with low suspicion for obstruction. GI recommending non-urgent ERCP. Given increased sputum production would treat for acute exacerbation of her bronchiectasis.     Agree with empiric antibiotics. ID input appreciated. Meropenem, inhaled aztreonam  Follow up sputum culture results  Bronchodilator and home airway clearance regimen  Supplemental O2 during daytime to target SpO2 > 92  AVAPS qHS    Discussed with patient via   Updated son over phone

## 2022-09-29 NOTE — PROGRESS NOTE ADULT - PROBLEM SELECTOR PLAN 5
# HTN  - likely contribution of pain in addition to chronic HTN  - amlodipine, metoprolol succinate  - consider adding losartan after further med rec    # anxiety/depression  - c/w venlafaxine

## 2022-09-29 NOTE — DIETITIAN INITIAL EVALUATION ADULT - NS FNS DIET ORDER
Diet, DASH/TLC:   Sodium & Cholesterol Restricted  Consistent Carbohydrate {Evening Snack} (CSTCHOSN)  Lacto Veg (Accepts Milk & Milk Products) (09-28-22 @ 19:35)

## 2022-09-29 NOTE — CONSULT NOTE ADULT - SUBJECTIVE AND OBJECTIVE BOX
HPI:  67F A1c 9.4%, primary ciliary dyskinesia and cystic bronchiectasis, ABPA.   irrhosis   cirrhosis, PSH remote cholecystectomy c/b strictures with remote PTC/PTBD/stent, presenting with 2-3 weeks of diffuse abdominal pain worst in epigastric region.    Severe, diffuse but primarily in the epigastrum.   Worse with eating.     She said she took only medications that were prescribed and, holding packet of omeprazole, said that her home medication provided only 1-2 minutes of relief. She endorsed a feeling of bloating and asked for a tube to help her empty her bowels.      Per son, English-speaking, interviewed by phone: Patient has had pain for the last 2-3 weeks in the upper and lower abdomen; it is prompted by eating. She eats a balanced diet but has had poor intake recently, with early satiety. She has lost weight, being 97-98 lbs at baseline, now 92 lbs. He endorsed her having a BM yesterday. He confirmed a remote history of cholecystectomy and that the stent for strictures was removed years ago.     He said his mother had a history of cystic fibrosis, a term he has seen in her medical records, and was unfamiliar with the term primary ciliary dyskinesia. She also coughs a lot; son thinks mucus is more thick than usual recently.     He denied patient having personal/family history of heart disease or cancer.     He endorsed helping patient take her prescription medicines, except for insulin, which patient administers to herself. He was unable to provide medication list during the call since he was at work and medications were at home.    Per chart review, patient has a history of primary ciliary dyskinesia with multiple admissions for COPD exacerbation and pneumonia, with use of home O2 2-3L NC, T2DM on insulin, anxiety, depression,  cirrhosis not confirmed by liver biopsy, and cholecystectomy (). In the ED, she endorsed shortness of breath in addition to abdominal pain. She received duonebs, 1L NS, Maalox, Pepcid, and pantoprazole. She was afebrile, HR 70-80s, hypertensive to 150s/80s, RR 15-20, satting 100% on 2LNC. CT/AP revealed new punctate nonobstructive choledocholithiasis, and U/S showed intrahepatic pneumobilia. She was found to be retaining 1L urine due to increased stool burden, and a Francisco was placed. (28 Sep 2022 16:29)      PAST MEDICAL & SURGICAL HISTORY:  ABPA (allergic bronchopulmonary aspergillosis)      Bronchiectasis      Hypertension      Vitamin D deficiency      Microcytic anemia      GERD (gastroesophageal reflux disease)      Postnasal drip      Pseudomonas aeruginosa colonization      Allergic rhinitis      Recurrent pneumonia      Type 2 diabetes mellitus, with long-term current use of insulin      Anxiety and depression      History of cholecystectomy      S/P dilatation of esophageal stricture          Allergies    No Known Allergies    Intolerances        ANTIMICROBIALS:  meropenem  IVPB 1000 every 8 hours  nystatin    Suspension [id number] daily      OTHER MEDS:  acetaminophen     Tablet .. 500 milliGRAM(s) Oral every 6 hours  ALBUTerol    90 MICROgram(s) HFA Inhaler 2 Puff(s) Inhalation every 6 hours PRN  amLODIPine   Tablet 5 milliGRAM(s) Oral daily  budesonide 160 MICROgram(s)/formoterol 4.5 MICROgram(s) Inhaler 2 Puff(s) Inhalation two times a day  dextrose 5%. 1000 milliLiter(s) IV Continuous <Continuous>  dextrose 5%. 1000 milliLiter(s) IV Continuous <Continuous>  dextrose 50% Injectable 25 Gram(s) IV Push once  dextrose 50% Injectable 12.5 Gram(s) IV Push once  dextrose 50% Injectable 25 Gram(s) IV Push once  dextrose Oral Gel 15 Gram(s) Oral once PRN  enoxaparin Injectable 30 milliGRAM(s) SubCutaneous every 24 hours  ferrous    sulfate 325 milliGRAM(s) Oral daily  fluticasone propionate 50 MICROgram(s)/spray Nasal Spray 1 Spray(s) Both Nostrils two times a day  glucagon  Injectable 1 milliGRAM(s) IntraMuscular once  influenza  Vaccine (HIGH DOSE) 0.7 milliLiter(s) IntraMuscular once  insulin glargine Injectable (LANTUS) 12 Unit(s) SubCutaneous at bedtime  insulin lispro (ADMELOG) corrective regimen sliding scale   SubCutaneous three times a day before meals  insulin lispro (ADMELOG) corrective regimen sliding scale   SubCutaneous at bedtime  insulin lispro Injectable (ADMELOG) 7 Unit(s) SubCutaneous three times a day before meals  ketorolac   Injectable 15 milliGRAM(s) IV Push every 8 hours PRN  lidocaine/prilocaine Cream 1 Application(s) Topical daily  metoprolol succinate ER 50 milliGRAM(s) Oral daily  montelukast 10 milliGRAM(s) Oral daily  pantoprazole    Tablet 40 milliGRAM(s) Oral before breakfast  senna 2 Tablet(s) Oral at bedtime  sodium chloride 3%  Inhalation 4 milliLiter(s) Inhalation every 8 hours PRN  tiotropium 18 MICROgram(s) Capsule 1 Capsule(s) Inhalation daily  venlafaxine XR. 37.5 milliGRAM(s) Oral daily      SOCIAL HISTORY:    FAMILY HISTORY:  Family history of diabetes mellitus  father    Family history of allergic rhinitis (Child, Child)  son, daughter    Family history of bronchitis  mother, father        ROS:  Unobtainable because:   All other systems negative   Constitutional: no fever, no chills  Eye: no vision changes  ENT: no sore throat, no rhinorrhea  Cardiovascular: no chest pain, no palpitation  Respiratory: no SOB, no cough  GI:  no abd pain, no vomiting, no diarrhea  urinary: no dysuria, no hematuria, no flank pain  musculoskeletal: no joint pain, no joint swelling  skin: no rash  neurology: no headache, no change in mental status  psych: no anxiety    Physical Exam:  General: awake, alert, non toxic  Head: atraumatic, normocephalic  Eyes: normal sclera and conjunctiva  ENT: no oropharyngeal lesions, no LAD, neck supple  Cardio: regular rate and rhythm   Respiratory: nonlabored on room air, clear bilaterally, no wheezing  abd: soft, bowel sounds present, not tender  : no suprapubic tenderness, no francisco  Musculoskeletal: no joint swelling, no edema  Skin: no rash  vascular: no phlebitis  Neurologic: no focal deficits  psych: normal affect       Drug Dosing Weight  Height (cm): 162.6 (28 Sep 2022 17:15)  Weight (kg): 45.9 (28 Sep 2022 17:15)  BMI (kg/m2): 17.4 (28 Sep 2022 17:15)  BSA (m2): 1.46 (28 Sep 2022 17:15)    Vital Signs Last 24 Hrs  T(F): 97.5 (22 @ 07:06), Max: 99 (22 @ 17:15)    Vital Signs Last 24 Hrs  HR: 65 (22 @ 12:43) (63 - 88)  BP: 158/70 (22 @ 07:06) (152/68 - 166/84)  RR: 19 (22 @ 07:06)  SpO2: 96% (22 @ 12:43) (94% - 99%)  Wt(kg): --                          12.4   8.57  )-----------( 154      ( 29 Sep 2022 05:00 )             38.5           132<L>  |  92<L>  |  10  ----------------------------<  145<H>  3.5   |  32<H>  |  0.33<L>    Ca    9.1      29 Sep 2022 05:00  Phos  3.1       Mg     1.90         TPro  5.9<L>  /  Alb  3.0<L>  /  TBili  0.3  /  DBili  x   /  AST  42<H>  /  ALT  35<H>  /  AlkPhos  143<H>        Urinalysis Basic - ( 28 Sep 2022 07:59 )    Color: Light Yellow / Appearance: Clear / S.024 / pH: x  Gluc: x / Ketone: Negative  / Bili: Negative / Urobili: <2 mg/dL   Blood: x / Protein: Trace / Nitrite: Positive   Leuk Esterase: Negative / RBC: 3 /HPF / WBC 1 /HPF   Sq Epi: x / Non Sq Epi: 2 /HPF / Bacteria: Negative        MICROBIOLOGY:  Culture - Sputum (collected 22 @ 17:27)  Source: .Sputum Sputum  Gram Stain (22 @ 21:59):    Moderate polymorphonuclear leukocytes per low power field    No Squamous epithelial cells per low power field    Numerous Gram Negative Rods per oil power field    Rare Gram positive cocci in pairs per oil power field    Culture - Urine (collected 22 @ 09:24)  Source: Clean Catch Clean Catch (Midstream)  Final Report (22 @ 07:20):    No growth    Rapid RVP Result: NotDetec ( @ 01:36)    RADIOLOGY:  Images below reviewed personally  US Abdomen Upper Quadrant Right (22 @ 10:28)   *  No biliary ductal dilatation.  *  Pneumobilia.  *  Cholecystectomy.  *  Cirrhotic liver.    CT Chest w/ IV Cont (22 @ 03:28)   1. Pulmonary findings are compatible with a chronic infectious or   inflammatory bronchitis and bronchiolitis.  2. Punctate nonobstructing choledocholithiasis in the distal common duct,   new from the prior study.   HPI:  67F A1c 9.4%, cirrhosis of unclear etiology, primary ciliary dyskinesia and cystic bronchiectasis.   Here for diffuse abdominal pain. Primarily epigastric. Worse with food. Severe.   Remote history of cholecystectomy and PTBD.   CT here with CBD stone but nonobstructing and not planned for ERCP inpatient.   Pain is a little better.   Family says it's probably gas pains. Comes and goes. No diarrhea or vomiting.   Also noted about two weeks of increased cough and sputum production. On Meropenem for history of resistant Pseudomonas.       PAST MEDICAL & SURGICAL HISTORY:  ABPA (allergic bronchopulmonary aspergillosis)      Bronchiectasis      Hypertension      Vitamin D deficiency      Microcytic anemia      GERD (gastroesophageal reflux disease)      Postnasal drip      Pseudomonas aeruginosa colonization      Allergic rhinitis      Recurrent pneumonia      Type 2 diabetes mellitus, with long-term current use of insulin      Anxiety and depression      History of cholecystectomy      S/P dilatation of esophageal stricture          Allergies    No Known Allergies    Intolerances        ANTIMICROBIALS:  meropenem  IVPB 1000 every 8 hours  nystatin    Suspension [id number] daily      OTHER MEDS:  acetaminophen     Tablet .. 500 milliGRAM(s) Oral every 6 hours  ALBUTerol    90 MICROgram(s) HFA Inhaler 2 Puff(s) Inhalation every 6 hours PRN  amLODIPine   Tablet 5 milliGRAM(s) Oral daily  budesonide 160 MICROgram(s)/formoterol 4.5 MICROgram(s) Inhaler 2 Puff(s) Inhalation two times a day  dextrose 5%. 1000 milliLiter(s) IV Continuous <Continuous>  dextrose 5%. 1000 milliLiter(s) IV Continuous <Continuous>  dextrose 50% Injectable 25 Gram(s) IV Push once  dextrose 50% Injectable 12.5 Gram(s) IV Push once  dextrose 50% Injectable 25 Gram(s) IV Push once  dextrose Oral Gel 15 Gram(s) Oral once PRN  enoxaparin Injectable 30 milliGRAM(s) SubCutaneous every 24 hours  ferrous    sulfate 325 milliGRAM(s) Oral daily  fluticasone propionate 50 MICROgram(s)/spray Nasal Spray 1 Spray(s) Both Nostrils two times a day  glucagon  Injectable 1 milliGRAM(s) IntraMuscular once  influenza  Vaccine (HIGH DOSE) 0.7 milliLiter(s) IntraMuscular once  insulin glargine Injectable (LANTUS) 12 Unit(s) SubCutaneous at bedtime  insulin lispro (ADMELOG) corrective regimen sliding scale   SubCutaneous three times a day before meals  insulin lispro (ADMELOG) corrective regimen sliding scale   SubCutaneous at bedtime  insulin lispro Injectable (ADMELOG) 7 Unit(s) SubCutaneous three times a day before meals  ketorolac   Injectable 15 milliGRAM(s) IV Push every 8 hours PRN  lidocaine/prilocaine Cream 1 Application(s) Topical daily  metoprolol succinate ER 50 milliGRAM(s) Oral daily  montelukast 10 milliGRAM(s) Oral daily  pantoprazole    Tablet 40 milliGRAM(s) Oral before breakfast  senna 2 Tablet(s) Oral at bedtime  sodium chloride 3%  Inhalation 4 milliLiter(s) Inhalation every 8 hours PRN  tiotropium 18 MICROgram(s) Capsule 1 Capsule(s) Inhalation daily  venlafaxine XR. 37.5 milliGRAM(s) Oral daily      SOCIAL HISTORY: Nonsmoker     FAMILY HISTORY:  Family history of diabetes mellitus  father    Family history of allergic rhinitis (Child, Child)  son, daughter    Family history of bronchitis  mother, father        ROS:  All other systems negative   Constitutional: no fever, no chills  Eye: no vision changes  ENT: no sore throat, no rhinorrhea  Cardiovascular: no chest pain, no palpitation  Respiratory: per HPI   GI:  per HPI   urinary: no dysuria   musculoskeletal: no joint pain, no joint swelling  skin: no rash  neurology: no change in mental status  psych: no anxiety    Physical Exam:  General: awake, non toxic but uncomfortable   Head: atraumatic, normocephalic  Eyes: normal sclera and conjunctiva  ENT: no LAD, neck supple  Cardio: regular rate and rhythm   Respiratory: nonlabored on nasal cannula, fine crackles   abd: soft, bowel sounds present, not tender  : no francisco  Musculoskeletal: no joint swelling  Skin: no rash  vascular: no phlebitis  Neurologic: no focal deficits  psych: normal affect       Drug Dosing Weight  Height (cm): 162.6 (28 Sep 2022 17:15)  Weight (kg): 45.9 (28 Sep 2022 17:15)  BMI (kg/m2): 17.4 (28 Sep 2022 17:15)  BSA (m2): 1.46 (28 Sep 2022 17:15)    Vital Signs Last 24 Hrs  T(F): 97.5 (22 @ 07:06), Max: 99 (22 @ 17:15)    Vital Signs Last 24 Hrs  HR: 65 (22 @ 12:43) (63 - 88)  BP: 158/70 (22 @ 07:06) (152/68 - 166/84)  RR: 19 (22 @ 07:06)  SpO2: 96% (22 @ 12:43) (94% - 99%)  Wt(kg): --                          12.4   8.57  )-----------( 154      ( 29 Sep 2022 05:00 )             38.5           132<L>  |  92<L>  |  10  ----------------------------<  145<H>  3.5   |  32<H>  |  0.33<L>    Ca    9.1      29 Sep 2022 05:00  Phos  3.1       Mg     1.90         TPro  5.9<L>  /  Alb  3.0<L>  /  TBili  0.3  /  DBili  x   /  AST  42<H>  /  ALT  35<H>  /  AlkPhos  143<H>        Urinalysis Basic - ( 28 Sep 2022 07:59 )    Color: Light Yellow / Appearance: Clear / S.024 / pH: x  Gluc: x / Ketone: Negative  / Bili: Negative / Urobili: <2 mg/dL   Blood: x / Protein: Trace / Nitrite: Positive   Leuk Esterase: Negative / RBC: 3 /HPF / WBC 1 /HPF   Sq Epi: x / Non Sq Epi: 2 /HPF / Bacteria: Negative        MICROBIOLOGY:  Culture - Sputum (collected 22 @ 17:27)  Source: .Sputum Sputum  Gram Stain (22 @ 21:59):    Moderate polymorphonuclear leukocytes per low power field    No Squamous epithelial cells per low power field    Numerous Gram Negative Rods per oil power field    Rare Gram positive cocci in pairs per oil power field    Culture - Urine (collected 22 @ 09:24)  Source: Clean Catch Clean Catch (Midstream)  Final Report (22 @ 07:20):    No growth    Rapid RVP Result: NotDetec ( @ 01:36)    RADIOLOGY:  Images below reviewed personally  US Abdomen Upper Quadrant Right (22 @ 10:28)   *  No biliary ductal dilatation.  *  Pneumobilia.  *  Cholecystectomy.  *  Cirrhotic liver.    CT Chest w/ IV Cont (22 @ 03:28)   1. Pulmonary findings are compatible with a chronic infectious or   inflammatory bronchitis and bronchiolitis.  2. Punctate nonobstructing choledocholithiasis in the distal common duct,   new from the prior study.

## 2022-09-29 NOTE — PROGRESS NOTE ADULT - SUBJECTIVE AND OBJECTIVE BOX
SURGERY  Pager: [phone]      INTERVAL EVENTS/SUBJECTIVE: No acute events overnight. Patient seen and examined at bedside with improving abdominal pain.     ______________________________________________  OBJECTIVE:   T(C): 36.4 (09-29-22 @ 07:06), Max: 37.2 (09-28-22 @ 17:15)  HR: 67 (09-29-22 @ 07:06) (63 - 88)  BP: 158/70 (09-29-22 @ 07:06) (144/66 - 166/84)  RR: 19 (09-29-22 @ 07:06) (18 - 26)  SpO2: 96% (09-29-22 @ 07:06) (94% - 100%)  Wt(kg): --  CAPILLARY BLOOD GLUCOSE      POCT Blood Glucose.: 117 mg/dL (29 Sep 2022 08:23)  POCT Blood Glucose.: 198 mg/dL (29 Sep 2022 00:51)  POCT Blood Glucose.: 348 mg/dL (28 Sep 2022 21:20)  POCT Blood Glucose.: 296 mg/dL (28 Sep 2022 16:31)  POCT Blood Glucose.: 192 mg/dL (28 Sep 2022 13:07)    I&O's Detail    28 Sep 2022 07:01  -  29 Sep 2022 07:00  --------------------------------------------------------  IN:  Total IN: 0 mL    OUT:    Indwelling Catheter - Urethral (mL): 1975 mL  Total OUT: 1975 mL    Total NET: -1975 mL          Physical exam:  Gen: resting in bed comfortably in NAD  Chest: equal chest rise, no increased WOB   Abdomen: Soft, mildly tender to palpation, no rebound or guarding   Vascular: WWP, GARCIA x4  NEURO: awake, alert  ______________________________________________  LABS:  CBC Full  -  ( 29 Sep 2022 05:00 )  WBC Count : 8.57 K/uL  RBC Count : 4.49 M/uL  Hemoglobin : 12.4 g/dL  Hematocrit : 38.5 %  Platelet Count - Automated : 154 K/uL  Mean Cell Volume : 85.7 fL  Mean Cell Hemoglobin : 27.6 pg  Mean Cell Hemoglobin Concentration : 32.2 gm/dL  Auto Neutrophil # : x  Auto Lymphocyte # : x  Auto Monocyte # : x  Auto Eosinophil # : x  Auto Basophil # : x  Auto Neutrophil % : x  Auto Lymphocyte % : x  Auto Monocyte % : x  Auto Eosinophil % : x  Auto Basophil % : x    09-29    132<L>  |  92<L>  |  10  ----------------------------<  145<H>  3.5   |  32<H>  |  0.33<L>    Ca    9.1      29 Sep 2022 05:00  Phos  3.1     09-29  Mg     1.90     09-29    TPro  5.9<L>  /  Alb  3.0<L>  /  TBili  0.3  /  DBili  x   /  AST  42<H>  /  ALT  35<H>  /  AlkPhos  143<H>  09-29    _____________________________________________  RADIOLOGY:

## 2022-09-29 NOTE — PROGRESS NOTE ADULT - SUBJECTIVE AND OBJECTIVE BOX
CHIEF COMPLAINT:Patient is a 67y old  Female who presents with a chief complaint of abdominal pain (29 Sep 2022 13:25)      Interval Events:  Interviewed patient with [facility]  Rivka [phone]. Update provided to son Ady  to provide update on plan from pulmonary standpoint by attending Dr. Nieto. Pt reports she did not receive her home inhalers as well as her aerobika. She is complaining of chest congestion, decreased airway clearance, and shortness of breath. She has both dyspnea and abdominal pain.     REVIEW OF SYSTEMS:  [x] All other systems negative except per HPI   [ ] Unable to assess ROS because ________    OBJECTIVE:  ICU Vital Signs Last 24 Hrs  T(C): 36.4 (29 Sep 2022 07:06), Max: 37.2 (28 Sep 2022 17:15)  T(F): 97.5 (29 Sep 2022 07:06), Max: 99 (28 Sep 2022 17:15)  HR: 65 (29 Sep 2022 12:43) (63 - 88)  BP: 158/70 (29 Sep 2022 07:06) (152/68 - 166/84)  BP(mean): --  ABP: --  ABP(mean): --  RR: 19 (29 Sep 2022 07:06) (19 - 26)  SpO2: 96% (29 Sep 2022 12:43) (94% - 99%)    O2 Parameters below as of 29 Sep 2022 12:43  Patient On (Oxygen Delivery Method): room air          Mode: standby     @ 07:01  -   @ 07:00  --------------------------------------------------------  IN: 0 mL / OUT: 1975 mL / NET: -1975 mL        PHYSICAL EXAM:  GENERAL: mild respiratory distress, well-groomed, well-developed  HEAD:  Atraumatic, Normocephalic  EYES: EOMI, PERRLA, conjunctiva and sclera clear  ENMT: No tonsillar erythema, exudates, or enlargement; Moist mucous membranes, Good dentition, No lesions  NECK: Supple, No JVD, Normal thyroid  CHEST/LUNG: (+) rales b/l  HEART: Regular rate and rhythm; No murmurs, rubs, or gallops  ABDOMEN: Soft, Nontender, Nondistended; Bowel sounds present  VASCULAR:  2+ Peripheral Pulses, No clubbing, cyanosis, or edema  LYMPH: No lymphadenopathy noted  SKIN: No rashes or lesions  NERVOUS SYSTEM:  Alert & Oriented X3, Good concentration; Motor Strength 5/5 B/L upper and lower extremities    HOSPITAL MEDICATIONS:  MEDICATIONS  (STANDING):  acetaminophen     Tablet .. 500 milliGRAM(s) Oral every 6 hours  amLODIPine   Tablet 5 milliGRAM(s) Oral daily  budesonide 160 MICROgram(s)/formoterol 4.5 MICROgram(s) Inhaler 2 Puff(s) Inhalation two times a day  dextrose 5%. 1000 milliLiter(s) (100 mL/Hr) IV Continuous <Continuous>  dextrose 5%. 1000 milliLiter(s) (50 mL/Hr) IV Continuous <Continuous>  dextrose 50% Injectable 25 Gram(s) IV Push once  dextrose 50% Injectable 12.5 Gram(s) IV Push once  dextrose 50% Injectable 25 Gram(s) IV Push once  enoxaparin Injectable 30 milliGRAM(s) SubCutaneous every 24 hours  ferrous    sulfate 325 milliGRAM(s) Oral daily  fluticasone propionate 50 MICROgram(s)/spray Nasal Spray 1 Spray(s) Both Nostrils two times a day  glucagon  Injectable 1 milliGRAM(s) IntraMuscular once  influenza  Vaccine (HIGH DOSE) 0.7 milliLiter(s) IntraMuscular once  insulin glargine Injectable (LANTUS) 12 Unit(s) SubCutaneous at bedtime  insulin lispro (ADMELOG) corrective regimen sliding scale   SubCutaneous three times a day before meals  insulin lispro (ADMELOG) corrective regimen sliding scale   SubCutaneous at bedtime  insulin lispro Injectable (ADMELOG) 7 Unit(s) SubCutaneous three times a day before meals  lidocaine/prilocaine Cream 1 Application(s) Topical daily  meropenem  IVPB 1000 milliGRAM(s) IV Intermittent every 8 hours  metoprolol succinate ER 50 milliGRAM(s) Oral daily  montelukast 10 milliGRAM(s) Oral daily  nystatin    Suspension [id number] Unit(s) Oral daily  pantoprazole    Tablet 40 milliGRAM(s) Oral before breakfast  senna 2 Tablet(s) Oral at bedtime  tiotropium 18 MICROgram(s) Capsule 1 Capsule(s) Inhalation daily  venlafaxine XR. 37.5 milliGRAM(s) Oral daily    MEDICATIONS  (PRN):  ALBUTerol    90 MICROgram(s) HFA Inhaler 2 Puff(s) Inhalation every 6 hours PRN Shortness of Breath and/or Wheezing  dextrose Oral Gel 15 Gram(s) Oral once PRN Blood Glucose LESS THAN 70 milliGRAM(s)/deciliter  ketorolac   Injectable 15 milliGRAM(s) IV Push every 8 hours PRN Severe Pain (7 - 10)  sodium chloride 3%  Inhalation 4 milliLiter(s) Inhalation every 8 hours PRN with aerobica/airway clearance      LABS:    The Labs were reviewed by me   The Radiology was reviewed by me    EKG tracing reviewed by me        132<L>  |  92<L>  |  10  ----------------------------<  145<H>  3.5   |  32<H>  |  0.33<L>      133<L>  |  94<L>  |  13  ----------------------------<  94  4.2   |  29  |  0.31<L>    Ca    9.1      29 Sep 2022 05:00  Ca    9.7      28 Sep 2022 02:13  Phos  3.1       Mg     1.90         TPro  5.9<L>  /  Alb  3.0<L>  /  TBili  0.3  /  DBili  x   /  AST  42<H>  /  ALT  35<H>  /  AlkPhos  143<H>    TPro  6.7  /  Alb  3.3  /  TBili  0.2  /  DBili  x   /  AST  31  /  ALT  32  /  AlkPhos  150<H>      Magnesium, Serum: 1.90 mg/dL (22 @ 05:00)    Phosphorus Level, Serum: 3.1 mg/dL (22 @ 05:00)                    Urinalysis Basic - ( 28 Sep 2022 07:59 )    Color: Light Yellow / Appearance: Clear / S.024 / pH: x  Gluc: x / Ketone: Negative  / Bili: Negative / Urobili: <2 mg/dL   Blood: x / Protein: Trace / Nitrite: Positive   Leuk Esterase: Negative / RBC: 3 /HPF / WBC 1 /HPF   Sq Epi: x / Non Sq Epi: 2 /HPF / Bacteria: Negative                              12.4   8.57  )-----------( 154      ( 29 Sep 2022 05:00 )             38.5                         13.1   9.61  )-----------( 178      ( 28 Sep 2022 02:13 )             41.3     CAPILLARY BLOOD GLUCOSE      POCT Blood Glucose.: 347 mg/dL (29 Sep 2022 12:17)  POCT Blood Glucose.: 340 mg/dL (29 Sep 2022 12:17)  POCT Blood Glucose.: 53 mg/dL (29 Sep 2022 11:53)  POCT Blood Glucose.: 49 mg/dL (29 Sep 2022 11:53)  POCT Blood Glucose.: 117 mg/dL (29 Sep 2022 08:23)  POCT Blood Glucose.: 198 mg/dL (29 Sep 2022 00:51)  POCT Blood Glucose.: 348 mg/dL (28 Sep 2022 21:20)  POCT Blood Glucose.: 296 mg/dL (28 Sep 2022 16:31)        MICROBIOLOGY:     RADIOLOGY:  [ ] Reviewed and interpreted by me    Point of Care Ultrasound Findings:    PFT:    EKG:

## 2022-09-29 NOTE — PROGRESS NOTE ADULT - PROBLEM SELECTOR PLAN 6
- VTE ppx: enoxaparin  - GI ppx: pantoprazole as above  - Diet: DASH/carb controlled  - dispo: pending clinical course

## 2022-09-29 NOTE — DIETITIAN INITIAL EVALUATION ADULT - PERTINENT LABORATORY DATA
09-29    132<L>  |  92<L>  |  10  ----------------------------<  145<H>  3.5   |  32<H>  |  0.33<L>    Ca    9.1      29 Sep 2022 05:00  Phos  3.1     09-29  Mg     1.90     09-29    TPro  5.9<L>  /  Alb  3.0<L>  /  TBili  0.3  /  DBili  x   /  AST  42<H>  /  ALT  35<H>  /  AlkPhos  143<H>  09-29  POCT Blood Glucose.: 347 mg/dL (09-29-22 @ 12:17)  A1C with Estimated Average Glucose Result: 9.4 % (09-29-22 @ 05:00)  A1C with Estimated Average Glucose Result: 9.4 % (01-21-22 @ 14:26)

## 2022-09-29 NOTE — PROGRESS NOTE ADULT - ASSESSMENT
67F with PMH cystic fibrosis (CF) versus primary ciliary dyskinesia (PCD), on home O2 at 2-3L NC, ABPA, allergic rhinitis, recurrent admissions for pneumonia; constipation; GERD, T2DM on insulin, anxiety, depression, cirrhosis, PSH remote cholecystectomy c/b strictures with remote PTC/PTBD/stent, presenting with 2-3 weeks of diffuse abdominal pain worst in epigastric region. Pain likely with contributions of choledocholithiasis as well as chronic constipation 2/2 CF/PCD.     # abdominal pain  Patient seemed to be taking omeprazole for minimal abdominal pain relief but might benefit from acetaminophen, possibly avoid opiates to avoid biliary colic, possibly avoid NSAID to avoid mucosal irritation/bleed (hx GERD)  - Surgery recommended MRCP vs ERCP with GI  - GI recommending outpatient ERCP  - order MRCP  - start acetaminophen 500 po q6h standing  - Toradol 15 q8 PRN for pain  - water enema  - Miralax, senna, bisacodyl po     # GERD  - pantoprazole, Maalox, home Pepcid     # early satiety - possibly 2/2 bloating/constipation, though malignancy possible     # weight loss - likely due to poor intake for pain avoidance, though malignancy possible  - consider mirtazapine after further med rec  - ask about cancer screening history    # shortness of breath  - resolved, at baseline       # bronchiectasis exacerbation likely 2/2 infection  - Hx Pseudomonas aeruginosa colonization  - per pulm recs:   Continue home inhalers - albuterol, spiriva, symbicort  C/w home airway clearance regimen: hypersal, aerobica  ID Consult for abx co-management - will likely need inhaled +/- systemic abx  c/w home O2   c/w NIPPV qhs at her home settings. Last settings documented at [facility]:  AVAPS RR 18, EPAP 5, Min IP 15, Max IP 25,   - start Zosyn IV  - consider RCU for specialized care  - appreciate pulm recs  - 1L bolus    # T2DM  - Lantus 12U, premeal 6U  - insulin sliding scale    # HTN  - likely contribution of pain in addition to chronic HTN  - amlodipine, metoprolol succinate  - consider adding losartan after further med rec    # anxiety/depression  - c/w venlafaxine    # cirrhosis  - trend INR/PT, LFT, CBC  - avoid >2g acetaminophen    # Ppx  - VTE ppx: enoxaparin 40 qD  - GI ppx: pantoprazole as above  - Diet: DASH/carb controlled  - dispo: pending clinical course 67F with PMH primary ciliary dyskinesia (PCD) and sequelae, including chronic hypoxemic hypercapnic respiratory failure (on home O2 at 2-3L NC and AVAPS at night), recurrent admissions for bronchiectasis/COPD exacerbations and pneumonia, MDR Pseudomonas colonization, constipation, cirrhosis, T2DM on insulin, GERD; anxiety, depression, PSH remote cholecystectomy c/b strictures with remote PTC/PTBD/stent, presenting with 2-3 weeks of diffuse abdominal pain worst in epigastric region, found to also have bronchiectasis exacerbation possibly 2/2 infection, now on meropenem.

## 2022-09-29 NOTE — PROGRESS NOTE ADULT - PROBLEM SELECTOR PLAN 3
# T2DM possibly 2/2 PCD  - Lantus 12U, premeal 6U  - insulin sliding scale  - A1c 9.4% - possibly 2/2 PCD  - Lantus 12U, premeal 6U  - insulin sliding scale  - A1c 9.4%

## 2022-09-29 NOTE — DIETITIAN INITIAL EVALUATION ADULT - OTHER INFO
Per chart review, 67F with PMH cystic fibrosis (CF) versus primary ciliary dyskinesia (PCD), on home O2 at 2-3L NC, ABPA, allergic rhinitis, recurrent admissions for pneumonia; constipation; GERD, T2DM on insulin, anxiety, depression, cirrhosis, PSH remote cholecystectomy c/b strictures with remote PTC/PTBD/stent, presenting with 2-3 weeks of diffuse abdominal pain worst in epigastric region. Pain likely with contributions of choledocholithiasis as well as chronic constipation 2/2 CF/PCD.     Patient seen at bedside, appears lethargic. Language Line  service utilized for [facility] ID#[id number]. Patient reports poor to fair appetite in house, able to consume ~25-50% of her meals. Patient is amenable to Glucerna 2x daily (440kcals, 20g protein). Patient reports having swallowing difficulty due to GERD. Pt states feels like "food gets stick in her throat". Patient c/o acid reflux in hospital. Recommend add GERD diet. In addition patient c/o constipation. Last BM yesterday. Ordered bowel regimen. Labs notable with high A1C 9.4H, and FS  348 currently managed by consistent CHO diet and insulin regimen. Recommend f/u with Endo for appropriate insulin regimen.   Per chart review, 67F with PMH cystic fibrosis (CF) versus primary ciliary dyskinesia (PCD), on home O2 at 2-3L NC, ABPA, allergic rhinitis, recurrent admissions for pneumonia; constipation; GERD, T2DM on insulin, anxiety, depression, cirrhosis, PSH remote cholecystectomy c/b strictures with remote PTC/PTBD/stent, presenting with 2-3 weeks of diffuse abdominal pain worst in epigastric region. Pain likely with contributions of choledocholithiasis as well as chronic constipation 2/2 CF/PCD.     Patient seen at bedside, appears lethargic. Language Line  service utilized for [facility] ID#[id number]. Patient reports poor to fair appetite in house, able to consume ~25-50% of her meals. Food preferences explored, patient is able to eat fish and chicken. Patient is amenable to Glucerna 2x daily (440kcals, 20g protein). Patient reports having swallowing difficulty due to GERD. Pt states feels like "food gets stick in her throat". Patient c/o acid reflux in hospital. Recommend add GERD diet. In addition patient c/o constipation. Last BM yesterday. Ordered bowel regimen. Labs notable with high A1C 9.4H, and FS  348 currently managed by consistent CHO diet and insulin regimen. Recommend f/u with Endo for appropriate insulin regimen.

## 2022-09-29 NOTE — CONSULT NOTE ADULT - ASSESSMENT
67F A1c 9.4%, primary ciliary dyskinesia and cystic bronchiectasis, ABPA.   Cirrhosis unclear cause.   Severe, diffuse but primarily in the epigastrum.   Worse with eating.   Afebrile.     Sabino Nielsen MD   Infectious Disease   Available on TEAMS. After 5PM and on weekends please page fellow on call or call [phone] 67F A1c 9.4%, cirrhosis unclear cause, primary ciliary dyskinesia and cystic bronchiectasis.   Here 9/28 for abdominal pain but also increased cough and sputum production for a few weeks.   Not sure why she has pain, maybe CBD stone.   Agree with treating for bronchiectasis flare.   Pseudomonas colonized.   Meropenem is reasonable for now, was sensitive back in Jan.     Discussed with pulmonary and medicine     Sabino Nielsen MD   Infectious Disease   Available on TEAMS. After 5PM and on weekends please page fellow on call or call [phone] 67F A1c 9.4%, cirrhosis unclear cause, primary ciliary dyskinesia and cystic bronchiectasis.   Here 9/28 for abdominal pain but also increased cough and sputum production for a few weeks.   Not sure why she has pain, maybe CBD stone.   Agree with treating for bronchiectasis flare.   Pseudomonas colonized.   Meropenem is reasonable for now, was sensitive back in Jan.   Since she doesn't tolerate inhaled aminoglycosides well, can try inhaled Aztreonam instead 75mg TID.     Discussed with pulmonary and medicine     Sabino Nielsen MD   Infectious Disease   Available on TEAMS. After 5PM and on weekends please page fellow on call or call [phone]

## 2022-09-29 NOTE — PROGRESS NOTE ADULT - ASSESSMENT
67 F with PMH constipation, ciliary dyskinesia c/b bronchiectasis/COPD (on 2-3L home oxygen), several prior admissions due to COPD exacerbations/pneumonia episodes, PSH cholecystectomy (1998, c/b CBD strictures managed with PTC/PTBD and stent placement that was removed per patient/family presenting with SOB and epigastric abdominal pain for two weeks. Surgery consulted for evaluation and management of choledocholithiasis based on CT findings of punctate nonobstructing choledocholithiasis in the distal common duct, new from the prior study. Tbili 0.2, WBC 9.6.    Recommendations  - No acute surgical intervention indicated; patient s/p lap tevin in 90's  - Per GI, recommending outpatient ERCP and follow up   - Please call back with questions      B team Surgery  [id number]

## 2022-09-29 NOTE — PROGRESS NOTE ADULT - PROBLEM SELECTOR PLAN 1
- likely 2/2 PCD c/b infection  - Hx MDR Pseudomonas aeruginosa; colonized  - sputum Cx, Gram stain: moderate PMN, numerous GNR, rare GPC in pairs  - f/u sputum Cx  - per pulm recs:      - Continue home inhalers - albuterol, spiriva, symbicort     - C/w home airway clearance regimen: hypersal, aerobica     - ID Consult for abx co-management - will likely need inhaled +/- systemic abx     - c/w home O2      - c/w NIPPV qhs at her home settings. Last settings documented at [facility]:  AVAPS RR 18, EPAP 5, Min IP 15, Max IP 25,   - duonebs once  - change to meropenem given culture history  - contact infection control re: MDR Pseudomonas  - consider RCU for specialized care  - appreciate pulm and ID recs

## 2022-09-29 NOTE — PROVIDER CONTACT NOTE (HYPOGLYCEMIA EVENT) - NS PROVIDER CONTACT BACKGROUND-HYPO
Age: 67y    Gender: Female    POCT Blood Glucose:  347 mg/dL (09-29-22 @ 12:17)  340 mg/dL (09-29-22 @ 12:17)  53 mg/dL (09-29-22 @ 11:53)  49 mg/dL (09-29-22 @ 11:53)      eMAR:  dextrose 50% Injectable   25 Gram(s) IV Push (09-29-22 @ 12:02)      insulin lispro (ADMELOG) corrective regimen sliding scale     8 Unit(s) SubCutaneous (09-29-22 @ 13:12)      insulin lispro Injectable (ADMELOG)     7 Unit(s) SubCutaneous (09-29-22 @ 13:12)

## 2022-09-29 NOTE — DIETITIAN INITIAL EVALUATION ADULT - ADD RECOMMEND
1) Recommend add GERD diet.   2) Recommend add Glucerna 2x daily (440kcals, 20g protein) to provide optimal nutrition.   3) Recommend consider MVI for micronutrient coverage.   4) Recommend consult for Endo for insulin regimen, given HbA1C 9.4.   5) Recommend eval for SLP eval for appropriate diet texture.   6) Monitor PO intake, Labs, weights, BMs, and skin integrity.   7) RD to remain available for further nutritional interventions as indicated.  1)  Recommend change Lacto Veg diet to No Beef/No Pork diet, and add GERD diet.     2) Recommend add Glucerna 2x daily (440kcals, 20g protein) to provide optimal nutrition.   3) Recommend consider MVI for micronutrient coverage.   4) Recommend consult for Endo for insulin regimen, given HbA1C 9.4.   5) Recommend eval for SLP eval for appropriate diet texture.   6) Monitor PO intake, Labs, weights, BMs, and skin integrity.   7) RD to remain available for further nutritional interventions as indicated.  1)  Recommend add GERD diet, and change Lacto Veg diet to No Beef/No Pork diet to maximize more food choices.   2) Recommend add Glucerna 2x daily (440kcals, 20g protein) to provide optimal nutrition.   3) Recommend consider MVI for micronutrient coverage.   4) Recommend consult for Endo for insulin regimen, given HbA1C 9.4.   5) Recommend eval for SLP eval for appropriate diet texture.   6) Monitor PO intake, Labs, weights, BMs, and skin integrity.   7) RD to remain available for further nutritional interventions as indicated.

## 2022-09-29 NOTE — DIETITIAN INITIAL EVALUATION ADULT - PERTINENT MEDS FT
MEDICATIONS  (STANDING):  acetaminophen     Tablet .. 500 milliGRAM(s) Oral every 6 hours  amLODIPine   Tablet 5 milliGRAM(s) Oral daily  budesonide 160 MICROgram(s)/formoterol 4.5 MICROgram(s) Inhaler 2 Puff(s) Inhalation two times a day  dextrose 5%. 1000 milliLiter(s) (50 mL/Hr) IV Continuous <Continuous>  dextrose 5%. 1000 milliLiter(s) (100 mL/Hr) IV Continuous <Continuous>  dextrose 50% Injectable 25 Gram(s) IV Push once  dextrose 50% Injectable 12.5 Gram(s) IV Push once  dextrose 50% Injectable 25 Gram(s) IV Push once  enoxaparin Injectable 30 milliGRAM(s) SubCutaneous every 24 hours  ferrous    sulfate 325 milliGRAM(s) Oral daily  fluticasone propionate 50 MICROgram(s)/spray Nasal Spray 1 Spray(s) Both Nostrils two times a day  glucagon  Injectable 1 milliGRAM(s) IntraMuscular once  influenza  Vaccine (HIGH DOSE) 0.7 milliLiter(s) IntraMuscular once  insulin glargine Injectable (LANTUS) 12 Unit(s) SubCutaneous at bedtime  insulin lispro (ADMELOG) corrective regimen sliding scale   SubCutaneous three times a day before meals  insulin lispro (ADMELOG) corrective regimen sliding scale   SubCutaneous at bedtime  insulin lispro Injectable (ADMELOG) 7 Unit(s) SubCutaneous three times a day before meals  lidocaine/prilocaine Cream 1 Application(s) Topical daily  meropenem  IVPB 1000 milliGRAM(s) IV Intermittent every 8 hours  metoprolol succinate ER 50 milliGRAM(s) Oral daily  montelukast 10 milliGRAM(s) Oral daily  nystatin    Suspension 705338 Unit(s) Oral daily  pantoprazole    Tablet 40 milliGRAM(s) Oral before breakfast  senna 2 Tablet(s) Oral at bedtime  tiotropium 18 MICROgram(s) Capsule 1 Capsule(s) Inhalation daily  venlafaxine XR. 37.5 milliGRAM(s) Oral daily    MEDICATIONS  (PRN):  ALBUTerol    90 MICROgram(s) HFA Inhaler 2 Puff(s) Inhalation every 6 hours PRN Shortness of Breath and/or Wheezing  dextrose Oral Gel 15 Gram(s) Oral once PRN Blood Glucose LESS THAN 70 milliGRAM(s)/deciliter  ketorolac   Injectable 15 milliGRAM(s) IV Push every 8 hours PRN Severe Pain (7 - 10)  sodium chloride 3%  Inhalation 4 milliLiter(s) Inhalation every 8 hours PRN with aerobica/airway clearance

## 2022-09-29 NOTE — PROGRESS NOTE ADULT - ATTENDING COMMENTS
Patient seen and examined 9/29    67 year old woman with past medical history of chronic hypoxemic / hypercapnic respiratory failure 2/2 ciliary dyskinesia c/b bronchiectasis/ COPD (on 2-3L home oxygen) on AVAPS at night several prior admissions due to exacerbations/pneumonia episodes, constipation, PSH cholecystectomy a/w abdominal pain etiology likely choledocholithiasis + constipation also a/w bronchiectasis exacerbation. Surgery and GI consulted, no acute surgical intervention indicated, no inpatient GI intervention indicated. MRCP recommended by surgery to further delineate bile duct anatomy. Notably bili wnl and patient with normal white count. GI plan for outpatient ERCP once further removed from her bronchiectasis exacerbation. For her bronchiectasis exacerbation pulmonary following, continuing home inhalers, airway clearance, c/w home O2, AVAPS at night. Changed abx to meropenem based off prior sensitivities. ID consulted in agreement. Will f/u with ID on inhaled abx recommendations. Patient on list to transfer to RCU once bed available.

## 2022-09-30 DIAGNOSIS — E11.9 TYPE 2 DIABETES MELLITUS WITHOUT COMPLICATIONS: ICD-10-CM

## 2022-09-30 DIAGNOSIS — J47.1 BRONCHIECTASIS WITH (ACUTE) EXACERBATION: ICD-10-CM

## 2022-09-30 DIAGNOSIS — E78.5 HYPERLIPIDEMIA, UNSPECIFIED: ICD-10-CM

## 2022-09-30 DIAGNOSIS — K59.00 CONSTIPATION, UNSPECIFIED: ICD-10-CM

## 2022-09-30 DIAGNOSIS — K74.60 UNSPECIFIED CIRRHOSIS OF LIVER: ICD-10-CM

## 2022-09-30 DIAGNOSIS — Z29.9 ENCOUNTER FOR PROPHYLACTIC MEASURES, UNSPECIFIED: ICD-10-CM

## 2022-09-30 DIAGNOSIS — R10.9 UNSPECIFIED ABDOMINAL PAIN: ICD-10-CM

## 2022-09-30 DIAGNOSIS — K80.50 CALCULUS OF BILE DUCT WITHOUT CHOLANGITIS OR CHOLECYSTITIS WITHOUT OBSTRUCTION: ICD-10-CM

## 2022-09-30 DIAGNOSIS — K21.9 GASTRO-ESOPHAGEAL REFLUX DISEASE WITHOUT ESOPHAGITIS: ICD-10-CM

## 2022-09-30 DIAGNOSIS — F41.9 ANXIETY DISORDER, UNSPECIFIED: ICD-10-CM

## 2022-09-30 DIAGNOSIS — R69 ILLNESS, UNSPECIFIED: ICD-10-CM

## 2022-09-30 DIAGNOSIS — I10 ESSENTIAL (PRIMARY) HYPERTENSION: ICD-10-CM

## 2022-09-30 LAB
-  AMIKACIN: SIGNIFICANT CHANGE UP
-  AZTREONAM: SIGNIFICANT CHANGE UP
-  CEFEPIME: SIGNIFICANT CHANGE UP
-  CEFTAZIDIME: SIGNIFICANT CHANGE UP
-  CIPROFLOXACIN: SIGNIFICANT CHANGE UP
-  GENTAMICIN: SIGNIFICANT CHANGE UP
-  IMIPENEM: SIGNIFICANT CHANGE UP
-  LEVOFLOXACIN: SIGNIFICANT CHANGE UP
-  MEROPENEM: SIGNIFICANT CHANGE UP
-  PIPERACILLIN/TAZOBACTAM: SIGNIFICANT CHANGE UP
-  TOBRAMYCIN: SIGNIFICANT CHANGE UP
ALBUMIN SERPL ELPH-MCNC: 3.1 G/DL — LOW (ref 3.3–5)
ALP SERPL-CCNC: 158 U/L — HIGH (ref 40–120)
ALT FLD-CCNC: 41 U/L — HIGH (ref 4–33)
ANION GAP SERPL CALC-SCNC: 8 MMOL/L — SIGNIFICANT CHANGE UP (ref 7–14)
AST SERPL-CCNC: 40 U/L — HIGH (ref 4–32)
BILIRUB SERPL-MCNC: 0.2 MG/DL — SIGNIFICANT CHANGE UP (ref 0.2–1.2)
BUN SERPL-MCNC: 10 MG/DL — SIGNIFICANT CHANGE UP (ref 7–23)
CALCIUM SERPL-MCNC: 9.1 MG/DL — SIGNIFICANT CHANGE UP (ref 8.4–10.5)
CHLORIDE SERPL-SCNC: 93 MMOL/L — LOW (ref 98–107)
CO2 SERPL-SCNC: 31 MMOL/L — SIGNIFICANT CHANGE UP (ref 22–31)
CREAT SERPL-MCNC: 0.33 MG/DL — LOW (ref 0.5–1.3)
CULTURE RESULTS: SIGNIFICANT CHANGE UP
EGFR: 115 ML/MIN/1.73M2 — SIGNIFICANT CHANGE UP
GLUCOSE BLDC GLUCOMTR-MCNC: 183 MG/DL — HIGH (ref 70–99)
GLUCOSE BLDC GLUCOMTR-MCNC: 211 MG/DL — HIGH (ref 70–99)
GLUCOSE BLDC GLUCOMTR-MCNC: 275 MG/DL — HIGH (ref 70–99)
GLUCOSE BLDC GLUCOMTR-MCNC: 78 MG/DL — SIGNIFICANT CHANGE UP (ref 70–99)
GLUCOSE SERPL-MCNC: 71 MG/DL — SIGNIFICANT CHANGE UP (ref 70–99)
HCT VFR BLD CALC: 42.3 % — SIGNIFICANT CHANGE UP (ref 34.5–45)
HGB BLD-MCNC: 13.8 G/DL — SIGNIFICANT CHANGE UP (ref 11.5–15.5)
MAGNESIUM SERPL-MCNC: 1.7 MG/DL — SIGNIFICANT CHANGE UP (ref 1.6–2.6)
MCHC RBC-ENTMCNC: 27.6 PG — SIGNIFICANT CHANGE UP (ref 27–34)
MCHC RBC-ENTMCNC: 32.6 GM/DL — SIGNIFICANT CHANGE UP (ref 32–36)
MCV RBC AUTO: 84.6 FL — SIGNIFICANT CHANGE UP (ref 80–100)
METHOD TYPE: SIGNIFICANT CHANGE UP
NRBC # BLD: 0 /100 WBCS — SIGNIFICANT CHANGE UP (ref 0–0)
NRBC # FLD: 0 K/UL — SIGNIFICANT CHANGE UP (ref 0–0)
ORGANISM # SPEC MICROSCOPIC CNT: SIGNIFICANT CHANGE UP
ORGANISM # SPEC MICROSCOPIC CNT: SIGNIFICANT CHANGE UP
PHOSPHATE SERPL-MCNC: 3.1 MG/DL — SIGNIFICANT CHANGE UP (ref 2.5–4.5)
PLATELET # BLD AUTO: 171 K/UL — SIGNIFICANT CHANGE UP (ref 150–400)
POTASSIUM SERPL-MCNC: 4 MMOL/L — SIGNIFICANT CHANGE UP (ref 3.5–5.3)
POTASSIUM SERPL-SCNC: 4 MMOL/L — SIGNIFICANT CHANGE UP (ref 3.5–5.3)
PROT SERPL-MCNC: 6.5 G/DL — SIGNIFICANT CHANGE UP (ref 6–8.3)
RBC # BLD: 5 M/UL — SIGNIFICANT CHANGE UP (ref 3.8–5.2)
RBC # FLD: 13.1 % — SIGNIFICANT CHANGE UP (ref 10.3–14.5)
SODIUM SERPL-SCNC: 132 MMOL/L — LOW (ref 135–145)
SPECIMEN SOURCE: SIGNIFICANT CHANGE UP
WBC # BLD: 9.92 K/UL — SIGNIFICANT CHANGE UP (ref 3.8–10.5)
WBC # FLD AUTO: 9.92 K/UL — SIGNIFICANT CHANGE UP (ref 3.8–10.5)

## 2022-09-30 PROCEDURE — 99233 SBSQ HOSP IP/OBS HIGH 50: CPT | Mod: GC

## 2022-09-30 PROCEDURE — 99254 IP/OBS CNSLTJ NEW/EST MOD 60: CPT

## 2022-09-30 PROCEDURE — 99233 SBSQ HOSP IP/OBS HIGH 50: CPT

## 2022-09-30 RX ORDER — POLYETHYLENE GLYCOL 3350 17 G/17G
17 POWDER, FOR SOLUTION ORAL
Refills: 0 | Status: DISCONTINUED | OUTPATIENT
Start: 2022-09-30 | End: 2022-10-04

## 2022-09-30 RX ORDER — INSULIN GLARGINE 100 [IU]/ML
6 INJECTION, SOLUTION SUBCUTANEOUS AT BEDTIME
Refills: 0 | Status: DISCONTINUED | OUTPATIENT
Start: 2022-09-30 | End: 2022-10-01

## 2022-09-30 RX ORDER — COLISTIMETHATE SODIUM 150 MG/2ML
150 INJECTION INTRAMUSCULAR; INTRAVENOUS EVERY 12 HOURS
Refills: 0 | Status: DISCONTINUED | OUTPATIENT
Start: 2022-09-30 | End: 2022-10-10

## 2022-09-30 RX ORDER — MIRTAZAPINE 45 MG/1
1 TABLET, ORALLY DISINTEGRATING ORAL
Qty: 0 | Refills: 0 | DISCHARGE

## 2022-09-30 RX ORDER — INSULIN LISPRO 100/ML
2 VIAL (ML) SUBCUTANEOUS
Refills: 0 | Status: DISCONTINUED | OUTPATIENT
Start: 2022-09-30 | End: 2022-10-02

## 2022-09-30 RX ORDER — BUDESONIDE AND FORMOTEROL FUMARATE DIHYDRATE 160; 4.5 UG/1; UG/1
0 AEROSOL RESPIRATORY (INHALATION)
Qty: 0 | Refills: 0 | DISCHARGE

## 2022-09-30 RX ORDER — VENLAFAXINE HCL 75 MG
0 CAPSULE, EXT RELEASE 24 HR ORAL
Qty: 0 | Refills: 0 | DISCHARGE

## 2022-09-30 RX ADMIN — Medication 1 SPRAY(S): at 17:55

## 2022-09-30 RX ADMIN — POLYETHYLENE GLYCOL 3350 17 GRAM(S): 17 POWDER, FOR SOLUTION ORAL at 18:07

## 2022-09-30 RX ADMIN — BUDESONIDE AND FORMOTEROL FUMARATE DIHYDRATE 2 PUFF(S): 160; 4.5 AEROSOL RESPIRATORY (INHALATION) at 21:36

## 2022-09-30 RX ADMIN — Medication 37.5 MILLIGRAM(S): at 12:48

## 2022-09-30 RX ADMIN — Medication 325 MILLIGRAM(S): at 13:42

## 2022-09-30 RX ADMIN — AMLODIPINE BESYLATE 5 MILLIGRAM(S): 2.5 TABLET ORAL at 06:24

## 2022-09-30 RX ADMIN — TIOTROPIUM BROMIDE 1 CAPSULE(S): 18 CAPSULE ORAL; RESPIRATORY (INHALATION) at 17:53

## 2022-09-30 RX ADMIN — BUDESONIDE AND FORMOTEROL FUMARATE DIHYDRATE 2 PUFF(S): 160; 4.5 AEROSOL RESPIRATORY (INHALATION) at 09:15

## 2022-09-30 RX ADMIN — Medication 500 MILLIGRAM(S): at 12:48

## 2022-09-30 RX ADMIN — Medication 3 MILLILITER(S): at 11:02

## 2022-09-30 RX ADMIN — Medication 3: at 17:48

## 2022-09-30 RX ADMIN — LIDOCAINE AND PRILOCAINE CREAM 1 APPLICATION(S): 25; 25 CREAM TOPICAL at 13:42

## 2022-09-30 RX ADMIN — MEROPENEM 100 MILLIGRAM(S): 1 INJECTION INTRAVENOUS at 21:34

## 2022-09-30 RX ADMIN — Medication 50 MILLIGRAM(S): at 06:24

## 2022-09-30 RX ADMIN — MONTELUKAST 10 MILLIGRAM(S): 4 TABLET, CHEWABLE ORAL at 13:42

## 2022-09-30 RX ADMIN — Medication 2 UNIT(S): at 17:50

## 2022-09-30 RX ADMIN — Medication 1 TABLET(S): at 13:43

## 2022-09-30 RX ADMIN — Medication 500000 UNIT(S): at 13:41

## 2022-09-30 RX ADMIN — PANTOPRAZOLE SODIUM 40 MILLIGRAM(S): 20 TABLET, DELAYED RELEASE ORAL at 06:01

## 2022-09-30 RX ADMIN — Medication 2: at 12:40

## 2022-09-30 RX ADMIN — Medication 2 UNIT(S): at 12:41

## 2022-09-30 RX ADMIN — Medication 500 MILLIGRAM(S): at 18:55

## 2022-09-30 RX ADMIN — Medication 500 MILLIGRAM(S): at 06:01

## 2022-09-30 RX ADMIN — Medication 3 MILLILITER(S): at 05:23

## 2022-09-30 RX ADMIN — INSULIN GLARGINE 6 UNIT(S): 100 INJECTION, SOLUTION SUBCUTANEOUS at 21:46

## 2022-09-30 RX ADMIN — MEROPENEM 100 MILLIGRAM(S): 1 INJECTION INTRAVENOUS at 13:42

## 2022-09-30 RX ADMIN — Medication 3 MILLILITER(S): at 15:40

## 2022-09-30 RX ADMIN — Medication 1 SPRAY(S): at 07:35

## 2022-09-30 RX ADMIN — Medication 5 MILLIGRAM(S): at 21:22

## 2022-09-30 RX ADMIN — ENOXAPARIN SODIUM 30 MILLIGRAM(S): 100 INJECTION SUBCUTANEOUS at 13:43

## 2022-09-30 RX ADMIN — MEROPENEM 100 MILLIGRAM(S): 1 INJECTION INTRAVENOUS at 06:01

## 2022-09-30 NOTE — PROGRESS NOTE ADULT - PROBLEM SELECTOR PLAN 6
- VTE ppx: enoxaparin  - GI ppx: pantoprazole as above  - Diet: DASH/carb controlled, GERD, no beef/pork, Glucerna *2 daily  - dispo: pending clinical course - possibly 2/2 PCD  - trend LFTs  - avoid >2g acetaminophen daily

## 2022-09-30 NOTE — PROGRESS NOTE ADULT - PROBLEM SELECTOR PLAN 3
- possibly 2/2 PCD  - Lantus 12U, premeal 6U  - insulin sliding scale  - A1c 9.4%  - endo c/s # constipation possibly 2/2 PCD  - water enema once  - Miralax, senna, bisacodyl, simethicone po; stool count    # early satiety - possibly 2/2 bloating/constipation, though malignancy possible  - bowel reg as above

## 2022-09-30 NOTE — PROGRESS NOTE ADULT - ASSESSMENT
67F w/ Primary Ciliary Dyskinesia, Chronic hyoxemic/hypercapneic Respiratory Failure on 2-3L Home O2 NC secondary to Bronchiectasis on AVAPS, Cirrhosis, Prior CBD stricture s/p dilation, stent placement and removal, HCV Ab+, presented to the ED for abdominal pain found to have punctate CBD stones. Pulmonary consulted for co-management of bronchiectasis. RVP negative. Pt follows with Dr. Young as outpt.     #Bronchiectasis Exacerbation  #Chronic hypoxemic and hypercapnic respiratory Failure    Multiple sputum cultures in the past with pseudomonas aeruginosa (sensitive to geoff and zosyn in 8/23/2022), klebsiella and elizabethkingia meningoseptica.  She has tried both inhaled amikacin and tobramycin in the past and was unable to tolerate it due to nausea and tremulousness.   Outpt workup for cystic fibrosis negative.   Aspergillus igg negative in the past.  sputum culture growing PsA    Recommendations:  hold off on steroids for now  Continue home inhalers - albuterol, spiriva, symbicort  C/w home airway clearance regimen: hypersal, aerobica, chest vest  Chest PT  ID Consult for abx co-management - will likely need systemic abx +/- inhaled (if able to tolerate)    c/w home O2   c/w NIPPV qhs at her home settings. Last settings documented at [facility]:  AVAPS RR 18, EPAP 5, Min IP 15, Max IP 25,   discussed with Dr. Uroistegui and primary team     Jose Chandra MD  [facility] PGY4  [facility] [id number], [facility] [phone]         No

## 2022-09-30 NOTE — PATIENT PROFILE ADULT - NSSTREETDRUGUSE_GEN_A_NUR
NURSE ANTICOAGULATION VISIT    Reymundo Parekh 1959 presents to clinic today for six week INR appointment.    No outpatient medications have been marked as taking for the 9/30/22 encounter (Nurse Only) with [facility] NURSE.       INR (no units)   Date Value   09/30/2022 2.2        GOAL RANGE: 2.0-3.0    ALLERGIES:   Allergen Reactions   • Dye [Contrast Media] VOMITING     Violent vomiting per patient   • Penicillins VOMITING       Patient Active Problem List   Diagnosis   • Other pulmonary embolism and infarction   • Colon cancer (CMS/HCC)   • Acute deep vein thrombosis (DVT) of distal vein of left lower extremity (CMS/HCC)   • S/P insertion of IVC (inferior vena caval) filter   • Long term current use of anticoagulant therapy   • Essential hypertension   • S/P bilateral TKA - 7/15/19   • Kidney stones   • Type 2 diabetes mellitus without complication, without long-term current use of insulin (CMS/[facility])   • Ascending aortic aneurysm (CMS/[facility])       PATIENT REPORTED CHANGES: none    NURSE DOSING ADJUSTMENTS AND EDUCATION: Patient verbally informed of INR result. No dosage change per Dr. Escobedo verbal order    7.5 mg 3 days a week  5 mg all other days    Patient will recheck INR in six weeks, sooner if changes or concerns develop.  Warfarin Management done via face to face.      On a as needed basis the signs and symptoms of bleeding and clotting will be review with the patient.  Reviewed and reinforced with patient the importance of calling the clinic with any medication, diet, and health related changes.  Importance of adherence to consistent diet in Vitamin K reviewed.  Affects of alcohol consumption and with concurrent use of Warfarin explained to patient.  Patient verbalized understanding.  Sent to Dr. Escobedo for review and signature.      John Anaya RN   never used

## 2022-09-30 NOTE — CONSULT NOTE ADULT - SUBJECTIVE AND OBJECTIVE BOX
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  Endocrinology consulted for assistance with management of type 2 diabetes.    The patient was able to provide history in [facility].    She reports that she presented to the hospital with diffuse abdominal pain.  Denies any nausea/vomiting.  Denies any recent change in appetite.  Endorses constipation.  She reports that she lives with her family, manages her diabetes on her own with the help of her primary care doctor, administers insulin injections by herself.    Diabetes history:  Type 2  Duration: 15 to 16 years  Home regimen: Lantus 15 units at bedtime, lispro 5 units with meals    Home blood glucose monitoring: 3 times a day  Trend: 200-400, can be up to 500  Reports that fasting blood sugars are usually 150-250  Hypoglycemia: Denies, reports that her lowest blood sugar is 150 at home    Diet: Breakfast is usually tea/biscuits/oats/bread/eggs.  Lunch and dinner are usually quinoa or rice or roti with vegetables.  Exercise: Minimal, walking    Diabetes related complications: Neuropathy, no other known complications  Family history of diabetes: None  Outpatient physician: PCP    She reports to me that she is on steroids off and on, most recently was on prednisone 10 mg daily.  She reports that she takes steroid courses for bronchiectasis exacerbations multiple times a year, they usually start at 40 mg daily and are slowly tapered off.    A1c: 9.4%  Inpatient diet: CHO consistent diet  Inpatient regimen: Lantus 6 units at bedtime, lispro 2 units with meals, low-dose sliding scale with meals  Weight: 46 kg    While inpatient, her blood sugars have ranged from 50s to 190s.  She was started on Lantus 12 units and lispro 7 units with meals when she was admitted, this seems to have been too much for her, resulting in hypoglycemia as low as 49.  Regimen has been titrated by the primary team and she is currently ordered for a lower insulin dose compared to what she takes at home.  She reports to me that she is very bothered by the hypoglycemia over the last 2 days, she gets very symptomatic with sweating and tingling sensations in her legs when she has hypoglycemia.      PAST MEDICAL & SURGICAL HISTORY:  ABPA (allergic bronchopulmonary aspergillosis)      Bronchiectasis      Hypertension      Vitamin D deficiency      Microcytic anemia      GERD (gastroesophageal reflux disease)      Postnasal drip      Pseudomonas aeruginosa colonization      Allergic rhinitis      Recurrent pneumonia      Type 2 diabetes mellitus, with long-term current use of insulin      Anxiety and depression      Bronchiectasis with acute exacerbation      History of cholecystectomy      S/P dilatation of esophageal stricture          FAMILY HISTORY:  Family history of diabetes mellitus  father    Family history of allergic rhinitis (Child, Child)  son, daughter    Family history of bronchitis  mother, father        Social History:  No tobacco, etoh or drug use    MEDICATIONS  (STANDING):  acetaminophen     Tablet .. 500 milliGRAM(s) Oral every 6 hours  albuterol/ipratropium for Nebulization 3 milliLiter(s) Nebulizer every 6 hours  amLODIPine   Tablet 5 milliGRAM(s) Oral daily  budesonide 160 MICROgram(s)/formoterol 4.5 MICROgram(s) Inhaler 2 Puff(s) Inhalation two times a day  colistimethate for Nebulization 150 milliGRAM(s) CBA Nebulizer every 12 hours  dextrose 5%. 1000 milliLiter(s) (50 mL/Hr) IV Continuous <Continuous>  dextrose 5%. 1000 milliLiter(s) (100 mL/Hr) IV Continuous <Continuous>  dextrose 50% Injectable 25 Gram(s) IV Push once  dextrose 50% Injectable 12.5 Gram(s) IV Push once  dextrose 50% Injectable 25 Gram(s) IV Push once  enoxaparin Injectable 30 milliGRAM(s) SubCutaneous every 24 hours  ferrous    sulfate 325 milliGRAM(s) Oral daily  fluticasone propionate 50 MICROgram(s)/spray Nasal Spray 1 Spray(s) Both Nostrils two times a day  glucagon  Injectable 1 milliGRAM(s) IntraMuscular once  influenza  Vaccine (HIGH DOSE) 0.7 milliLiter(s) IntraMuscular once  insulin glargine Injectable (LANTUS) 6 Unit(s) SubCutaneous at bedtime  insulin lispro (ADMELOG) corrective regimen sliding scale   SubCutaneous three times a day before meals  insulin lispro (ADMELOG) corrective regimen sliding scale   SubCutaneous at bedtime  insulin lispro Injectable (ADMELOG) 2 Unit(s) SubCutaneous three times a day before meals  lidocaine/prilocaine Cream 1 Application(s) Topical daily  meropenem  IVPB 1000 milliGRAM(s) IV Intermittent every 8 hours  metoprolol succinate ER 50 milliGRAM(s) Oral daily  montelukast 10 milliGRAM(s) Oral daily  multivitamin 1 Tablet(s) Oral daily  nystatin    Suspension 906657 Unit(s) Oral daily  pantoprazole    Tablet 40 milliGRAM(s) Oral before breakfast  senna 2 Tablet(s) Oral at bedtime  tiotropium 18 MICROgram(s) Capsule 1 Capsule(s) Inhalation daily  venlafaxine XR. 37.5 milliGRAM(s) Oral daily    MEDICATIONS  (PRN):  ALBUTerol    90 MICROgram(s) HFA Inhaler 2 Puff(s) Inhalation every 6 hours PRN Shortness of Breath and/or Wheezing  dextrose Oral Gel 15 Gram(s) Oral once PRN Blood Glucose LESS THAN 70 milliGRAM(s)/deciliter  ketorolac   Injectable 15 milliGRAM(s) IV Push every 8 hours PRN Severe Pain (7 - 10)  simethicone 80 milliGRAM(s) Chew four times a day PRN Gas Pain  sodium chloride 3%  Inhalation 4 milliLiter(s) Inhalation every 8 hours PRN with aerobica/airway clearance      Allergies    No Known Allergies    Intolerances        Review of Systems:  Constitutional: No fever, good appetite/po intake  Eyes: No blurry vision, diplopia  Neuro: No tremors  HEENT: No pain  Cardiovascular: No chest pain, palpitations  Respiratory: No SOB, no cough  GI: No nausea, vomiting  : No dysuria, hematuria  Skin: no rash  Psych: no depression  Endocrine: no polyuria, polydipsia  Hem/lymph: no swelling  Osteoporosis: no fractures    ALL OTHER SYSTEMS REVIEWED AND NEGATIVE    PHYSICAL EXAM:  VITALS: T(C): 36.3 (09-30-22 @ 06:20)  T(F): 97.3 (09-30-22 @ 06:20), Max: 97.9 (09-29-22 @ 21:26)  HR: 79 (09-30-22 @ 06:20) (75 - 89)  BP: 125/56 (09-30-22 @ 06:20) (125/56 - 144/77)  RR:  (17 - 18)  SpO2:  (97% - 99%)  Wt(kg): --  GENERAL: NAD, well-groomed, well-developed  EYES: No proptosis, extraocular movements intact,  no lid lag, anicteric  HEENT:  Atraumatic, Normocephalic, moist mucous membranes  RESPIRATORY: Normal chest expansion, no respiratory distress  CARDIOVASCULAR: Well perfused extremities, no peripheral edema  GI: Soft, nontender, non distended, normal bowel sounds  SKIN: Dry, intact, No rashes or lesions  EXTREMITIES: No foot ulcers, warm to touch   NEURO: sensation intact, no tremors  PSYCH: reactive affect, euthymic mood  ENDO: No stigmata of Cushings or acromegaly       Labs:                               13.8   9.92  )-----------( 171      ( 30 Sep 2022 06:34 )             42.3       09-30    132<L>  |  93<L>  |  10  ----------------------------<  71  4.0   |  31  |  0.33<L>    eGFR: 115    Ca    9.1      09-30  Mg     1.70     09-30  Phos  3.1     09-30    TPro  6.5  /  Alb  3.1<L>  /  TBili  0.2  /  DBili  x   /  AST  40<H>  /  ALT  41<H>  /  AlkPhos  158<H>  09-30

## 2022-09-30 NOTE — PROGRESS NOTE ADULT - ATTENDING COMMENTS
Patient seen and examined 9/30    67 year old woman with past medical history of chronic hypoxemic / hypercapnic respiratory failure 2/2 ciliary dyskinesia c/b bronchiectasis/ COPD (on 2-3L home oxygen) on AVAPS at night several prior admissions due to exacerbations/pneumonia episodes, constipation, PSH cholecystectomy a/w abdominal pain etiology likely choledocholithiasis + constipation also a/w bronchiectasis exacerbation. Surgery and GI consulted, no acute surgical intervention indicated, no inpatient GI intervention indicated. MRCP recommended by surgery to further delineate bile duct anatomy. Notably bili wnl and patient with normal white count. GI plan for outpatient ERCP once further removed from her bronchiectasis exacerbation. For her bronchiectasis exacerbation pulmonary following, continuing home inhalers, airway clearance, c/w home O2, AVAPS at night. Changed abx to meropenem based off prior sensitivities. ID consulted in agreement. Will f/u with ID on inhaled abx recommendations. Patient on list to transfer to [facility] once bed available.

## 2022-09-30 NOTE — PROGRESS NOTE ADULT - PROBLEM SELECTOR PLAN 1
- likely 2/2 PCD c/b infection  - Hx MDR Pseudomonas aeruginosa; colonized  - sputum Cx, Gram stain: moderate PMN, numerous GNR, rare GPC in pairs  - f/u sputum Cx  - per pulm recs:      - Continue home inhalers - albuterol, spiriva, symbicort     - C/w home airway clearance regimen: hypersal, aerobica     - ID Consult for abx co-management - will likely need inhaled +/- systemic abx     - c/w home O2 - target O2sat 92     - c/w NIPPV qhs at her home settings. Last settings documented at [facility]:  AVAPS RR 18, EPAP 5, Min IP 15, Max IP 25,   - duonebs q6h  - meropenem     - no inhaled ABX option -- no aztreonam formulation here, patient has previously not tolerated inhaled aminoglycoside  - consider RCU for specialized care  - appreciate pulm and ID recs - likely 2/2 PCD c/b infection  - Hx MDR Pseudomonas aeruginosa; colonized  - sputum Cx, Gram stain: moderate PMN, numerous GNR, rare GPC in pairs  - f/u sputum Cx  - per pulm recs:      - Continue home inhalers - albuterol, spiriva, symbicort     - C/w home airway clearance regimen: hypersal, aerobica     - c/w home O2 - target O2sat 92     - c/w NIPPV qhs at her home settings. Last settings documented at [facility]:  AVAPS RR 18, EPAP 5, Min IP 15, Max IP 25,   - duonebs q6h  - meropenem + inhaled Colistin per ID recs  - transfer to RCU for specialized care when bed becomes available  - appreciate pulm and ID recs

## 2022-09-30 NOTE — PROGRESS NOTE ADULT - SUBJECTIVE AND OBJECTIVE BOX
Follow Up: bronchiectasis     Interval History/ROS: [facility]  [id number]. Abdominal pain is a little better. Feels like her cough is the same, stuck with mucous that she can't get out. Afebrile.     Allergies  No Known Allergies        ANTIMICROBIALS:  colistimethate for Nebulization 150 every 12 hours  meropenem  IVPB 1000 every 8 hours  nystatin    Suspension [id number] daily      OTHER MEDS:  acetaminophen     Tablet .. 500 milliGRAM(s) Oral every 6 hours  ALBUTerol    90 MICROgram(s) HFA Inhaler 2 Puff(s) Inhalation every 6 hours PRN  albuterol/ipratropium for Nebulization 3 milliLiter(s) Nebulizer every 6 hours  amLODIPine   Tablet 5 milliGRAM(s) Oral daily  budesonide 160 MICROgram(s)/formoterol 4.5 MICROgram(s) Inhaler 2 Puff(s) Inhalation two times a day  dextrose 5%. 1000 milliLiter(s) IV Continuous <Continuous>  dextrose 5%. 1000 milliLiter(s) IV Continuous <Continuous>  dextrose 50% Injectable 25 Gram(s) IV Push once  dextrose 50% Injectable 12.5 Gram(s) IV Push once  dextrose 50% Injectable 25 Gram(s) IV Push once  dextrose Oral Gel 15 Gram(s) Oral once PRN  enoxaparin Injectable 30 milliGRAM(s) SubCutaneous every 24 hours  ferrous    sulfate 325 milliGRAM(s) Oral daily  fluticasone propionate 50 MICROgram(s)/spray Nasal Spray 1 Spray(s) Both Nostrils two times a day  glucagon  Injectable 1 milliGRAM(s) IntraMuscular once  influenza  Vaccine (HIGH DOSE) 0.7 milliLiter(s) IntraMuscular once  insulin glargine Injectable (LANTUS) 6 Unit(s) SubCutaneous at bedtime  insulin lispro (ADMELOG) corrective regimen sliding scale   SubCutaneous three times a day before meals  insulin lispro (ADMELOG) corrective regimen sliding scale   SubCutaneous at bedtime  insulin lispro Injectable (ADMELOG) 2 Unit(s) SubCutaneous three times a day before meals  ketorolac   Injectable 15 milliGRAM(s) IV Push every 8 hours PRN  lidocaine/prilocaine Cream 1 Application(s) Topical daily  metoprolol succinate ER 50 milliGRAM(s) Oral daily  montelukast 10 milliGRAM(s) Oral daily  multivitamin 1 Tablet(s) Oral daily  pantoprazole    Tablet 40 milliGRAM(s) Oral before breakfast  senna 2 Tablet(s) Oral at bedtime  simethicone 80 milliGRAM(s) Chew four times a day PRN  sodium chloride 3%  Inhalation 4 milliLiter(s) Inhalation every 8 hours PRN  tiotropium 18 MICROgram(s) Capsule 1 Capsule(s) Inhalation daily  venlafaxine XR. 37.5 milliGRAM(s) Oral daily      Vital Signs Last 24 Hrs  T(C): 36.3 (30 Sep 2022 06:20), Max: 36.6 (29 Sep 2022 21:26)  T(F): 97.3 (30 Sep 2022 06:20), Max: 97.9 (29 Sep 2022 21:26)  HR: 79 (30 Sep 2022 06:20) (75 - 89)  BP: 125/56 (30 Sep 2022 06:20) (125/56 - 144/77)  BP(mean): --  RR: 17 (30 Sep 2022 06:20) (17 - 18)  SpO2: 99% (30 Sep 2022 06:20) (97% - 99%)    Parameters below as of 30 Sep 2022 06:20  Patient On (Oxygen Delivery Method): nasal cannula  O2 Flow (L/min): 2.5      Physical Exam:  General: frail, tired but non toxic  Cardio: regular rate   Respiratory: occasional cough, nonlabored on nasal cannula   abd: nondistended, soft, nontender   Skin: no rash                          13.8   9.92  )-----------( 171      ( 30 Sep 2022 06:34 )             42.3       09-30    132<L>  |  93<L>  |  10  ----------------------------<  71  4.0   |  31  |  0.33<L>    Ca    9.1      30 Sep 2022 06:34  Phos  3.1     09-30  Mg     1.70     09-30    TPro  6.5  /  Alb  3.1<L>  /  TBili  0.2  /  DBili  x   /  AST  40<H>  /  ALT  41<H>  /  AlkPhos  158<H>  09-30          MICROBIOLOGY:  Culture - Sputum (collected 09-28-22 @ 17:27)  Source: .Sputum Sputum  Gram Stain (09-28-22 @ 21:59):    Moderate polymorphonuclear leukocytes per low power field    No Squamous epithelial cells per low power field    Numerous Gram Negative Rods per oil power field    Rare Gram positive cocci in pairs per oil power field  Preliminary Report (09-29-22 @ 16:48):    Moderate Pseudomonas aeruginosa    Normal Respiratory Denise present    Culture - Urine (collected 09-28-22 @ 09:24)  Source: Clean Catch Clean Catch (Midstream)  Final Report (09-29-22 @ 07:20):    No growth    Rapid RVP Result: NotDetec (09-28 @ 01:36)    RADIOLOGY:  Images below reviewed personally  US Abdomen Upper Quadrant Right (09.28.22 @ 10:28)   *  No biliary ductal dilatation.  *  Pneumobilia.  *  Cholecystectomy.  *  Cirrhotic liver.    CT Chest w/ IV Cont (09.28.22 @ 03:28)   1. Pulmonary findings are compatible with a chronic infectious or   inflammatory bronchitis and bronchiolitis.  2. Punctate nonobstructing choledocholithiasis in the distal common duct,   new from the prior study.

## 2022-09-30 NOTE — PROGRESS NOTE ADULT - PROBLEM SELECTOR PLAN 5
# HTN  - likely contribution of pain in addition to chronic HTN  - amlodipine, metoprolol succinate  - consider adding losartan after further med rec    # anxiety/depression  - c/w venlafaxine - possibly 2/2 PCD  - Lantus 6U, premeal 2U  - insulin sliding scale  - A1c 9.4%  - endo c/s

## 2022-09-30 NOTE — PROGRESS NOTE ADULT - PROBLEM SELECTOR PLAN 4
- possibly 2/2 PCD  - trend LFTs  - avoid >2g acetaminophen daily # possibly bilary colic 2/2 choledocholithiasis 2/2 PCD  - f/u MRCP; GI recommending outpatient ERCP    # analgesia  - Patient seemed to be taking omeprazole for minimal abdominal pain relief but might benefit from acetaminophen, possibly avoid opiates to avoid biliary colic, possibly avoid NSAID to avoid mucosal irritation/bleed (hx GERD)  - acetaminophen 500 po q6h standing, Toradol 15 q8 PRN for pain    # weight loss - likely due to poor intake for pain avoidance, though malignancy possible  - diet as per nutrition, multivitamin (has home iron), weekly weights

## 2022-09-30 NOTE — CONSULT NOTE ADULT - ASSESSMENT
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  Endocrinology consulted for assistance with management of type 2 diabetes.    #Type 2 Diabetes Mellitus, poorly controlled. Frequent hypoglycemia while inpatient.   - HbA1c: 9.4%  - home regimen: Lantus 15u HS, Lispro 5u AC  - Weight: 46kg   - Suspect that her BG fluctuate at home with varying doses of steroids    Recommendations:   - Agree with the lower doses of insulin as ordered by primary team given her multiple and frequent hypoglycemia while inpatient, and poor appetite in the setting of abdominal pain  - Recommend 6 units of lantus QHS  - Recommend 2 units of lispro TIDQAC  - Recommend low dose lispro correction scale TIDQAC and QHS  - Please check FSG before meals and QHS  - hypoglycemia orderset prn  - please let us know if she will be started on steroids    Discharge planning: Basal bolus insulin, doses to be determined.     #Hypertension  - BP goal <130/80  - Management as per primary team    #Hyperlipidemia  - check fasting lipid panel  - she would benefit from a statin given h/o type 2 DM and age 40-75    Klaudia Lynch MD  Attending Physician   Department of Endocrinology, Diabetes and Metabolism   [employer] Teams for 09-30-22 @ 15:21.    If before 9AM or after 5PM, or on weekends/holidays, please call the Endocrine answering service for assistance ([phone]).  For nonurgent matters, please email [email or web address] for assistance.     Please note that a different provider may be following this patient each day.

## 2022-09-30 NOTE — PROGRESS NOTE ADULT - ASSESSMENT
67F with PMH primary ciliary dyskinesia (PCD) and sequelae, including chronic hypoxemic hypercapnic respiratory failure (on home O2 at 2-3L NC and AVAPS at night), recurrent admissions for bronchiectasis/COPD exacerbations and pneumonia, MDR Pseudomonas colonization, constipation, cirrhosis, T2DM on insulin, GERD; anxiety, depression, PSH remote cholecystectomy c/b strictures with remote PTC/PTBD/stent, presenting with 2-3 weeks of diffuse abdominal pain worst in epigastric region, found to also have bronchiectasis exacerbation possibly 2/2 infection, now on meropenem.

## 2022-09-30 NOTE — PROGRESS NOTE ADULT - ASSESSMENT
67F A1c 9.4%, cirrhosis unclear cause, primary ciliary dyskinesia and cystic bronchiectasis.   Here 9/28 for abdominal pain but also increased cough and sputum production for a few weeks.   Not sure why she has pain, maybe CBD stone.   Agree with treating for bronchiectasis flare.   Pseudomonas colonized.     Suggest  -Meropenem is reasonable for now, was sensitive back in Jan   -try inhaled Colistin 150mg q12h for 28 days (intolerant of Tobramycin, Aztreonam not available)    -f/u sensitivities (I requested for colistin as well)     Discussed with medicine   I will be rotating to [facility]. ID service will follow.     Sabino Nielsen MD   Infectious Disease   Available on TEAMS. After 5PM and on weekends please page fellow on call or call [phone]

## 2022-09-30 NOTE — PHARMACOTHERAPY INTERVENTION NOTE - COMMENTS
Medication history is complete. Medication list updated in Outpatient Medication Record (OMR). Spoke with daughter-in-law (Suki), reviewed medication list/dosages and confirmed with outpatient pharmacy ([facility]).

## 2022-09-30 NOTE — PROGRESS NOTE ADULT - SUBJECTIVE AND OBJECTIVE BOX
Progress Note     == draft in progress ==  Nehal Alba  PGY-1 Medicine  Teams | [id number]    Patient is a 67y old  Female who presents with a chief complaint of abdominal pain (29 Sep 2022 14:13)          SUBJECTIVE / INTERVAL EVENTS             MEDICATIONS    Home Medications:  Admelog SoloStar 100 units/mL injectable solution: 7 unit(s) injectable 3 times a day (before meals) (29 Sep 2022 19:41)  ALBUTEROL SULFATE (2.5 MG/3ML)0.083% NEBULIZER: USE 1 UNIT DOSE EVERY 4-6 HOURS AS NEEDED FOR WHEEZING . (29 Sep 2022 19:41)  AMLODIPINE BESYLATE 5MG TABLET: TAKE 1 TABLET (5 MG) BY MOUTH DAILY FOR HIGH BLOOD PRESSURE (29 Sep 2022 19:41)  bisacodyl 5 mg oral delayed release tablet: 1 tab(s) orally 2 times a day (28 Sep 2022 18:32)  Calcium 600+D 600 mg-5 mcg (200 intl units) oral tablet: 1 tab(s) orally 2 times a day (28 Sep 2022 18:32)  CYANOCOBALAMIN 1000MCG/ML SOLUTION: ADMINISTER 1 MILLILITER (1,000 MCG) SUBCUTANEOUS EVERY 7 DAYS (29 Sep 2022 19:41)  Effexor XR 37.5 mg oral capsule, extended release: 1 cap(s) orally once a day (29 Sep 2022 19:41)  famotidine 40 mg oral tablet: 1 tab(s) orally once a day (at bedtime) (28 Sep 2022 18:32)  FERROUS GLUCONATE 324 (38 FE)MG TABLET: TAKE 1 TABLET BY MOUTH 2 TIMES PER DAY BETWEEN MEALS IN WATER OR JUICE (29 Sep 2022 19:41)  Flonase 50 mcg/inh nasal spray: 1 spray(s) in each nostril once a day (28 Sep 2022 18:32)  GNP GAS RELIEF 80 MG ORAL TABLET CHEWABLE: TAKE 1 TABLET BY MOUTH 4 TIMES PER DAY AFTER MEALS AND AT BEDTIME AS NEEDED FOR GAS (29 Sep 2022 19:41)  HYDROCORTISONE 2.5% CREAM: APPLY RECTALLY TWICE A DAY (29 Sep 2022 19:41)  ketotifen 0.025% ophthalmic solution: 1 drop(s) to each affected eye 2 times a day (28 Sep 2022 18:32)  Lantus 100 units/mL subcutaneous solution: 15 unit(s) subcutaneous once a day (at bedtime) (29 Sep 2022 19:41)  LIDOCAINE/PRILOCAINE 2.5-2.5% CRM: APPLY RECTALLY TWICE A DAY (29 Sep 2022 19:41)  LORAZEPAM 2 MG ORAL TABLET: TAKE 1 TABLET (2 MG) BY MOUTH DAILY AT BEDTIME AS NEEDED FOR TROUBLE SLEEPING (28 Sep 2022 18:32)  MELOXICAM 7.5MG TABLET: TAKE 1 TABLET (7.5 MG) BY MOUTH DAILY (29 Sep 2022 19:41)  metoprolol succinate 50 mg oral tablet, extended release: 1 tab(s) orally once a day (29 Sep 2022 19:41)  mirtazapine 7.5 mg oral tablet: 1 tab(s) orally once a day (at bedtime) (28 Sep 2022 18:32)  MONTELUKAST SODIUM 10MG TABLET: TAKE 1 TABLET (10 MG) BY MOUTH DAILY (29 Sep 2022 19:41)  OMEPRAZOLE-SODIUM BICARBONATE 40-1680MG PACK: 1 PACKET ORALLY DAILY MIXED WITH 5 TO 10 ML OF WATER ON AN EMPTY STOMACH (29 Sep 2022 19:41)  ProAir HFA 90 mcg/inh inhalation aerosol: 2 puff(s) inhaled every 6 hours, As Needed (29 Sep 2022 19:41)  sennosides-docusate 8.6 mg-50 mg oral tablet: 2 tab(s) orally once a day (at bedtime) (29 Sep 2022 19:41)  Spiriva 18 mcg inhalation capsule: 1 cap(s) inhaled once a day (29 Sep 2022 19:41)  SYMBICORT 160-4.5MCG INHA: INHALE 2 PUFFS BY MOUTH 2 TIMES PER DAY (29 Sep 2022 19:41)  VENLAFAXINE HCL ER 37.5MG EXT-RELEASE CAPSULE: TAKE 1 CAPSULE (37.5 MG) BY MOUTH DAILY WITH FOOD (29 Sep 2022 19:41)      MEDICATIONS  (STANDING):  acetaminophen     Tablet .. 500 milliGRAM(s) Oral every 6 hours  albuterol/ipratropium for Nebulization 3 milliLiter(s) Nebulizer every 6 hours  amLODIPine   Tablet 5 milliGRAM(s) Oral daily  budesonide 160 MICROgram(s)/formoterol 4.5 MICROgram(s) Inhaler 2 Puff(s) Inhalation two times a day  dextrose 5%. 1000 milliLiter(s) (50 mL/Hr) IV Continuous <Continuous>  dextrose 5%. 1000 milliLiter(s) (100 mL/Hr) IV Continuous <Continuous>  dextrose 50% Injectable 25 Gram(s) IV Push once  dextrose 50% Injectable 12.5 Gram(s) IV Push once  dextrose 50% Injectable 25 Gram(s) IV Push once  enoxaparin Injectable 30 milliGRAM(s) SubCutaneous every 24 hours  ferrous    sulfate 325 milliGRAM(s) Oral daily  fluticasone propionate 50 MICROgram(s)/spray Nasal Spray 1 Spray(s) Both Nostrils two times a day  glucagon  Injectable 1 milliGRAM(s) IntraMuscular once  influenza  Vaccine (HIGH DOSE) 0.7 milliLiter(s) IntraMuscular once  insulin glargine Injectable (LANTUS) 9 Unit(s) SubCutaneous at bedtime  insulin lispro (ADMELOG) corrective regimen sliding scale   SubCutaneous three times a day before meals  insulin lispro (ADMELOG) corrective regimen sliding scale   SubCutaneous at bedtime  insulin lispro Injectable (ADMELOG) 4 Unit(s) SubCutaneous three times a day before meals  lidocaine/prilocaine Cream 1 Application(s) Topical daily  meropenem  IVPB 1000 milliGRAM(s) IV Intermittent every 8 hours  metoprolol succinate ER 50 milliGRAM(s) Oral daily  montelukast 10 milliGRAM(s) Oral daily  nystatin    Suspension 049036 Unit(s) Oral daily  pantoprazole    Tablet 40 milliGRAM(s) Oral before breakfast  senna 2 Tablet(s) Oral at bedtime  tiotropium 18 MICROgram(s) Capsule 1 Capsule(s) Inhalation daily  venlafaxine XR. 37.5 milliGRAM(s) Oral daily    MEDICATIONS  (PRN):  ALBUTerol    90 MICROgram(s) HFA Inhaler 2 Puff(s) Inhalation every 6 hours PRN Shortness of Breath and/or Wheezing  dextrose Oral Gel 15 Gram(s) Oral once PRN Blood Glucose LESS THAN 70 milliGRAM(s)/deciliter  ketorolac   Injectable 15 milliGRAM(s) IV Push every 8 hours PRN Severe Pain (7 - 10)  simethicone 80 milliGRAM(s) Chew four times a day PRN Gas Pain  sodium chloride 3%  Inhalation 4 milliLiter(s) Inhalation every 8 hours PRN with aerobica/airway clearance         VITALS / I&O's  T(C): 36.3 (09-30-22 @ 06:20), Max: 36.6 (09-29-22 @ 21:26)  HR: 79 (09-30-22 @ 06:20) (65 - 89)  BP: 125/56 (09-30-22 @ 06:20) (125/56 - 144/77)  RR: 17 (09-30-22 @ 06:20) (17 - 19)  SpO2: 99% (09-30-22 @ 06:20) (96% - 99%)  I&O's Summary    29 Sep 2022 07:01  -  30 Sep 2022 07:00  --------------------------------------------------------  IN: 0 mL / OUT: 1000 mL / NET: -1000 mL           PHYSICAL EXAM           LABS    - AM fasting POCT bG 78  - CBC wnl, slight uptrend all  - moderate Pseudomonas aeruginosa                        13.8   9.92  )-----------( 171      ( 30 Sep 2022 06:34 )             42.3     09-30    132<L>  |  93<L>  |  10  ----------------------------<  71  4.0   |  31  |  0.33<L>    Ca    9.1      30 Sep 2022 06:34  Phos  3.1     09-30  Mg     1.70     09-30    TPro  6.5  /  Alb  3.1<L>  /  TBili  0.2  /  DBili  x   /  AST  40<H>  /  ALT  41<H>  /  AlkPhos  158<H>  09-30    CAPILLARY BLOOD GLUCOSE      POCT Blood Glucose.: 78 mg/dL (30 Sep 2022 07:20)  POCT Blood Glucose.: 97 mg/dL (29 Sep 2022 23:31)  POCT Blood Glucose.: 147 mg/dL (29 Sep 2022 22:05)  POCT Blood Glucose.: 81 mg/dL (29 Sep 2022 21:38)  POCT Blood Glucose.: 178 mg/dL (29 Sep 2022 21:36)  POCT Blood Glucose.: 58 mg/dL (29 Sep 2022 21:32)  POCT Blood Glucose.: 158 mg/dL (29 Sep 2022 17:33)  POCT Blood Glucose.: 347 mg/dL (29 Sep 2022 12:17)  POCT Blood Glucose.: 340 mg/dL (29 Sep 2022 12:17)  POCT Blood Glucose.: 53 mg/dL (29 Sep 2022 11:53)  POCT Blood Glucose.: 49 mg/dL (29 Sep 2022 11:53)  POCT Blood Glucose.: 117 mg/dL (29 Sep 2022 08:23)      LIVER FUNCTIONS - ( 30 Sep 2022 06:34 )  Alb: 3.1 g/dL / Pro: 6.5 g/dL / ALK PHOS: 158 U/L / ALT: 41 U/L / AST: 40 U/L / GGT: x                       Culture - Sputum (collected 28 Sep 2022 17:27)  Source: .Sputum Sputum  Gram Stain (28 Sep 2022 21:59):    Moderate polymorphonuclear leukocytes per low power field    No Squamous epithelial cells per low power field    Numerous Gram Negative Rods per oil power field    Rare Gram positive cocci in pairs per oil power field  Preliminary Report (29 Sep 2022 16:48):    Moderate Pseudomonas aeruginosa    Normal Respiratory Denise present    Culture - Urine (collected 28 Sep 2022 09:24)  Source: Clean Catch Clean Catch (Midstream)  Final Report (29 Sep 2022 07:20):    No growth        Progress Note     Nehal Parth  PGY-1 Medicine  Teams | [id number]    Patient is a 67y old  Female who presents with a chief complaint of abdominal pain (29 Sep 2022 14:13)          SUBJECTIVE / INTERVAL EVENTS  Patient seen and evaluated at bedside. Interviewed with Dr. Syed barragan.   Patient denied fever, nausea/vomiting, chest pain, limb pain, new numbness/tingling, or dysuria. She endorsed abdominal pain, mostly in the epigastric region, that comes and goes; she did not provide a number on the pain scale, said pain was better than previously. She endorsed observing weight loss over the last 2-3 months when she has been sick; at one point she said she had not weighed herself and noticed the change in her appearance, but she also offered baseline weights of 43-44 kg in contrast to 41 kg. She endorsed having an enema earlier this hospitalization and 2 small BM over the last day; she was amenable to another enema.  Collateral from senior resident's conversation with son: Patient takes lorazepam occasionally at home as sleep aid; does not take mirtazapine, which was replaced with venlafaxine.       MEDICATIONS    Home Medications:  Admelog SoloStar 100 units/mL injectable solution: 7 unit(s) injectable 3 times a day (before meals) (29 Sep 2022 19:41)  ALBUTEROL SULFATE (2.5 MG/3ML)0.083% NEBULIZER: USE 1 UNIT DOSE EVERY 4-6 HOURS AS NEEDED FOR WHEEZING . (29 Sep 2022 19:41)  AMLODIPINE BESYLATE 5MG TABLET: TAKE 1 TABLET (5 MG) BY MOUTH DAILY FOR HIGH BLOOD PRESSURE (29 Sep 2022 19:41)  bisacodyl 5 mg oral delayed release tablet: 1 tab(s) orally 2 times a day (28 Sep 2022 18:32)  Calcium 600+D 600 mg-5 mcg (200 intl units) oral tablet: 1 tab(s) orally 2 times a day (28 Sep 2022 18:32)  CYANOCOBALAMIN 1000MCG/ML SOLUTION: ADMINISTER 1 MILLILITER (1,000 MCG) SUBCUTANEOUS EVERY 7 DAYS (29 Sep 2022 19:41)  Effexor XR 37.5 mg oral capsule, extended release: 1 cap(s) orally once a day (29 Sep 2022 19:41)  famotidine 40 mg oral tablet: 1 tab(s) orally once a day (at bedtime) (28 Sep 2022 18:32)  FERROUS GLUCONATE 324 (38 FE)MG TABLET: TAKE 1 TABLET BY MOUTH 2 TIMES PER DAY BETWEEN MEALS IN WATER OR JUICE (29 Sep 2022 19:41)  Flonase 50 mcg/inh nasal spray: 1 spray(s) in each nostril once a day (28 Sep 2022 18:32)  GNP GAS RELIEF 80 MG ORAL TABLET CHEWABLE: TAKE 1 TABLET BY MOUTH 4 TIMES PER DAY AFTER MEALS AND AT BEDTIME AS NEEDED FOR GAS (29 Sep 2022 19:41)  HYDROCORTISONE 2.5% CREAM: APPLY RECTALLY TWICE A DAY (29 Sep 2022 19:41)  ketotifen 0.025% ophthalmic solution: 1 drop(s) to each affected eye 2 times a day (28 Sep 2022 18:32)  Lantus 100 units/mL subcutaneous solution: 15 unit(s) subcutaneous once a day (at bedtime) (29 Sep 2022 19:41)  LIDOCAINE/PRILOCAINE 2.5-2.5% CRM: APPLY RECTALLY TWICE A DAY (29 Sep 2022 19:41)  LORAZEPAM 2 MG ORAL TABLET: TAKE 1 TABLET (2 MG) BY MOUTH DAILY AT BEDTIME AS NEEDED FOR TROUBLE SLEEPING (28 Sep 2022 18:32)  MELOXICAM 7.5MG TABLET: TAKE 1 TABLET (7.5 MG) BY MOUTH DAILY (29 Sep 2022 19:41)  metoprolol succinate 50 mg oral tablet, extended release: 1 tab(s) orally once a day (29 Sep 2022 19:41)  mirtazapine 7.5 mg oral tablet: 1 tab(s) orally once a day (at bedtime) (28 Sep 2022 18:32)  MONTELUKAST SODIUM 10MG TABLET: TAKE 1 TABLET (10 MG) BY MOUTH DAILY (29 Sep 2022 19:41)  OMEPRAZOLE-SODIUM BICARBONATE 40-1680MG PACK: 1 PACKET ORALLY DAILY MIXED WITH 5 TO 10 ML OF WATER ON AN EMPTY STOMACH (29 Sep 2022 19:41)  ProAir HFA 90 mcg/inh inhalation aerosol: 2 puff(s) inhaled every 6 hours, As Needed (29 Sep 2022 19:41)  sennosides-docusate 8.6 mg-50 mg oral tablet: 2 tab(s) orally once a day (at bedtime) (29 Sep 2022 19:41)  Spiriva 18 mcg inhalation capsule: 1 cap(s) inhaled once a day (29 Sep 2022 19:41)  SYMBICORT 160-4.5MCG INHA: INHALE 2 PUFFS BY MOUTH 2 TIMES PER DAY (29 Sep 2022 19:41)  VENLAFAXINE HCL ER 37.5MG EXT-RELEASE CAPSULE: TAKE 1 CAPSULE (37.5 MG) BY MOUTH DAILY WITH FOOD (29 Sep 2022 19:41)      MEDICATIONS  (STANDING):  acetaminophen     Tablet .. 500 milliGRAM(s) Oral every 6 hours  albuterol/ipratropium for Nebulization 3 milliLiter(s) Nebulizer every 6 hours  amLODIPine   Tablet 5 milliGRAM(s) Oral daily  budesonide 160 MICROgram(s)/formoterol 4.5 MICROgram(s) Inhaler 2 Puff(s) Inhalation two times a day  dextrose 5%. 1000 milliLiter(s) (50 mL/Hr) IV Continuous <Continuous>  dextrose 5%. 1000 milliLiter(s) (100 mL/Hr) IV Continuous <Continuous>  dextrose 50% Injectable 25 Gram(s) IV Push once  dextrose 50% Injectable 12.5 Gram(s) IV Push once  dextrose 50% Injectable 25 Gram(s) IV Push once  enoxaparin Injectable 30 milliGRAM(s) SubCutaneous every 24 hours  ferrous    sulfate 325 milliGRAM(s) Oral daily  fluticasone propionate 50 MICROgram(s)/spray Nasal Spray 1 Spray(s) Both Nostrils two times a day  glucagon  Injectable 1 milliGRAM(s) IntraMuscular once  influenza  Vaccine (HIGH DOSE) 0.7 milliLiter(s) IntraMuscular once  insulin glargine Injectable (LANTUS) 9 Unit(s) SubCutaneous at bedtime  insulin lispro (ADMELOG) corrective regimen sliding scale   SubCutaneous three times a day before meals  insulin lispro (ADMELOG) corrective regimen sliding scale   SubCutaneous at bedtime  insulin lispro Injectable (ADMELOG) 4 Unit(s) SubCutaneous three times a day before meals  lidocaine/prilocaine Cream 1 Application(s) Topical daily  meropenem  IVPB 1000 milliGRAM(s) IV Intermittent every 8 hours  metoprolol succinate ER 50 milliGRAM(s) Oral daily  montelukast 10 milliGRAM(s) Oral daily  nystatin    Suspension 794807 Unit(s) Oral daily  pantoprazole    Tablet 40 milliGRAM(s) Oral before breakfast  senna 2 Tablet(s) Oral at bedtime  tiotropium 18 MICROgram(s) Capsule 1 Capsule(s) Inhalation daily  venlafaxine XR. 37.5 milliGRAM(s) Oral daily    MEDICATIONS  (PRN):  ALBUTerol    90 MICROgram(s) HFA Inhaler 2 Puff(s) Inhalation every 6 hours PRN Shortness of Breath and/or Wheezing  dextrose Oral Gel 15 Gram(s) Oral once PRN Blood Glucose LESS THAN 70 milliGRAM(s)/deciliter  ketorolac   Injectable 15 milliGRAM(s) IV Push every 8 hours PRN Severe Pain (7 - 10)  simethicone 80 milliGRAM(s) Chew four times a day PRN Gas Pain  sodium chloride 3%  Inhalation 4 milliLiter(s) Inhalation every 8 hours PRN with aerobica/airway clearance         VITALS / I&O's  T(C): 36.3 (09-30-22 @ 06:20), Max: 36.6 (09-29-22 @ 21:26)  HR: 79 (09-30-22 @ 06:20) (65 - 89)  BP: 125/56 (09-30-22 @ 06:20) (125/56 - 144/77)  RR: 17 (09-30-22 @ 06:20) (17 - 19)  SpO2: 99% (09-30-22 @ 06:20) (96% - 99%)  I&O's Summary    29 Sep 2022 07:01  -  30 Sep 2022 07:00  --------------------------------------------------------  IN: 0 mL / OUT: 1000 mL / NET: -1000 mL           PHYSICAL EXAM  No isolation stethoscope in room.   General: Seated in bed, in no acute distress. Ill-appearing, thin.  HEENT: Normocephalic, atraumatic. Extraocular movements grossly intact. No nasal discharge.  Neuro: Alert and oriented. No facial asymmetry or dysarthria. Moving all extremities.  CV: No distal edema. WWP.  Respiratory: No increased work of breathing. Conversing in full sentences.  Abdomen: Soft; tender to palpation, all quadrants; nondistended. No rebound or guarding.   : Suprapubic region tender.  Skin: No rashes or bruising of face, forearms, or legs bilaterally.  Psych: Mood and affect appropriate.         LABS    - AM fasting POCT bG 78  - CBC wnl, slight uptrend all  - moderate Pseudomonas aeruginosa                        13.8   9.92  )-----------( 171      ( 30 Sep 2022 06:34 )             42.3     09-30    132<L>  |  93<L>  |  10  ----------------------------<  71  4.0   |  31  |  0.33<L>    Ca    9.1      30 Sep 2022 06:34  Phos  3.1     09-30  Mg     1.70     09-30    TPro  6.5  /  Alb  3.1<L>  /  TBili  0.2  /  DBili  x   /  AST  40<H>  /  ALT  41<H>  /  AlkPhos  158<H>  09-30    CAPILLARY BLOOD GLUCOSE      POCT Blood Glucose.: 78 mg/dL (30 Sep 2022 07:20)  POCT Blood Glucose.: 97 mg/dL (29 Sep 2022 23:31)  POCT Blood Glucose.: 147 mg/dL (29 Sep 2022 22:05)  POCT Blood Glucose.: 81 mg/dL (29 Sep 2022 21:38)  POCT Blood Glucose.: 178 mg/dL (29 Sep 2022 21:36)  POCT Blood Glucose.: 58 mg/dL (29 Sep 2022 21:32)  POCT Blood Glucose.: 158 mg/dL (29 Sep 2022 17:33)  POCT Blood Glucose.: 347 mg/dL (29 Sep 2022 12:17)  POCT Blood Glucose.: 340 mg/dL (29 Sep 2022 12:17)  POCT Blood Glucose.: 53 mg/dL (29 Sep 2022 11:53)  POCT Blood Glucose.: 49 mg/dL (29 Sep 2022 11:53)  POCT Blood Glucose.: 117 mg/dL (29 Sep 2022 08:23)      LIVER FUNCTIONS - ( 30 Sep 2022 06:34 )  Alb: 3.1 g/dL / Pro: 6.5 g/dL / ALK PHOS: 158 U/L / ALT: 41 U/L / AST: 40 U/L / GGT: x             Culture - Sputum (collected 28 Sep 2022 17:27)  Source: .Sputum Sputum  Gram Stain (28 Sep 2022 21:59):    Moderate polymorphonuclear leukocytes per low power field    No Squamous epithelial cells per low power field    Numerous Gram Negative Rods per oil power field    Rare Gram positive cocci in pairs per oil power field  Preliminary Report (29 Sep 2022 16:48):    Moderate Pseudomonas aeruginosa    Normal Respiratory Denise present    Culture - Urine (collected 28 Sep 2022 09:24)  Source: Clean Catch Clean Catch (Midstream)  Final Report (29 Sep 2022 07:20):    No growth

## 2022-09-30 NOTE — PROGRESS NOTE ADULT - ATTENDING COMMENTS
67 year old female with primary ciliary dyskinesia, bronchiectasis, and chronic hypoxemic and hypercapnic respiratory on AVAPS who presents with abdominal pain and increased sputum production. Found to have choledocholithiasis which is unchanged with low suspicion for obstruction. GI recommending non-urgent ERCP. Being treated for acute exacerbation of her bronchiectasis.     Agree with empiric antibiotics. ID input appreciated. Meropenem, on inhaled colistin  Follow up sputum culture results  Bronchodilator and home airway clearance regimen  Supplemental O2 during daytime to target SpO2 > 92  AVAPS qHS    Updated son over phone

## 2022-09-30 NOTE — PROGRESS NOTE ADULT - SUBJECTIVE AND OBJECTIVE BOX
CHIEF COMPLAINT:Patient is a 67y old  Female who presents with a chief complaint of abdominal pain (30 Sep 2022 08:10)      Interval Events: no overnight events, Patient with continued productive sputum. unchanged from yesterday. Abdominal pain has resolbed.    REVIEW OF SYSTEMS:  [x] All other systems negative except per HPI   [ ] Unable to assess ROS because ________    OBJECTIVE:  ICU Vital Signs Last 24 Hrs  T(C): 36.3 (30 Sep 2022 06:20), Max: 36.6 (29 Sep 2022 21:26)  T(F): 97.3 (30 Sep 2022 06:20), Max: 97.9 (29 Sep 2022 21:26)  HR: 79 (30 Sep 2022 06:20) (65 - 89)  BP: 125/56 (30 Sep 2022 06:20) (125/56 - 144/77)  BP(mean): --  ABP: --  ABP(mean): --  RR: 17 (30 Sep 2022 06:20) (17 - 19)  SpO2: 99% (30 Sep 2022 06:20) (96% - 99%)    O2 Parameters below as of 30 Sep 2022 06:20  Patient On (Oxygen Delivery Method): nasal cannula  O2 Flow (L/min): 2.5        Mode: standby    09-29 @ 07:01  -  09-30 @ 07:00  --------------------------------------------------------  IN: 0 mL / OUT: 1000 mL / NET: -1000 mL        PHYSICAL EXAM:  GENERAL: NAD, well-groomed, well-developed  HEAD:  Atraumatic, Normocephalic  EYES: EOMI, PERRLA, conjunctiva and sclera clear  ENMT: No tonsillar erythema, exudates, or enlargement; Moist mucous membranes, Good dentition, No lesions  NECK: Supple, No JVD, Normal thyroid  CHEST/LUNG: Clear to auscultation bilaterally; No rales, rhonchi, wheezing, or rubs  HEART: Regular rate and rhythm; No murmurs, rubs, or gallops  ABDOMEN: Soft, Nontender, Nondistended; Bowel sounds present  VASCULAR:  2+ Peripheral Pulses, No clubbing, cyanosis, or edema  LYMPH: No lymphadenopathy noted  SKIN: No rashes or lesions  NERVOUS SYSTEM:  Alert & Oriented X3, Good concentration; Motor Strength 5/5 B/L upper and lower extremities; DTRs 2+ intact and symmetric    HOSPITAL MEDICATIONS:  MEDICATIONS  (STANDING):  acetaminophen     Tablet .. 500 milliGRAM(s) Oral every 6 hours  albuterol/ipratropium for Nebulization 3 milliLiter(s) Nebulizer every 6 hours  amLODIPine   Tablet 5 milliGRAM(s) Oral daily  budesonide 160 MICROgram(s)/formoterol 4.5 MICROgram(s) Inhaler 2 Puff(s) Inhalation two times a day  dextrose 5%. 1000 milliLiter(s) (50 mL/Hr) IV Continuous <Continuous>  dextrose 5%. 1000 milliLiter(s) (100 mL/Hr) IV Continuous <Continuous>  dextrose 50% Injectable 25 Gram(s) IV Push once  dextrose 50% Injectable 12.5 Gram(s) IV Push once  dextrose 50% Injectable 25 Gram(s) IV Push once  enoxaparin Injectable 30 milliGRAM(s) SubCutaneous every 24 hours  ferrous    sulfate 325 milliGRAM(s) Oral daily  fluticasone propionate 50 MICROgram(s)/spray Nasal Spray 1 Spray(s) Both Nostrils two times a day  glucagon  Injectable 1 milliGRAM(s) IntraMuscular once  influenza  Vaccine (HIGH DOSE) 0.7 milliLiter(s) IntraMuscular once  insulin glargine Injectable (LANTUS) 6 Unit(s) SubCutaneous at bedtime  insulin lispro (ADMELOG) corrective regimen sliding scale   SubCutaneous three times a day before meals  insulin lispro (ADMELOG) corrective regimen sliding scale   SubCutaneous at bedtime  insulin lispro Injectable (ADMELOG) 2 Unit(s) SubCutaneous three times a day before meals  lidocaine/prilocaine Cream 1 Application(s) Topical daily  meropenem  IVPB 1000 milliGRAM(s) IV Intermittent every 8 hours  metoprolol succinate ER 50 milliGRAM(s) Oral daily  montelukast 10 milliGRAM(s) Oral daily  multivitamin 1 Tablet(s) Oral daily  nystatin    Suspension [id number] Unit(s) Oral daily  pantoprazole    Tablet 40 milliGRAM(s) Oral before breakfast  senna 2 Tablet(s) Oral at bedtime  tiotropium 18 MICROgram(s) Capsule 1 Capsule(s) Inhalation daily  venlafaxine XR. 37.5 milliGRAM(s) Oral daily    MEDICATIONS  (PRN):  ALBUTerol    90 MICROgram(s) HFA Inhaler 2 Puff(s) Inhalation every 6 hours PRN Shortness of Breath and/or Wheezing  dextrose Oral Gel 15 Gram(s) Oral once PRN Blood Glucose LESS THAN 70 milliGRAM(s)/deciliter  ketorolac   Injectable 15 milliGRAM(s) IV Push every 8 hours PRN Severe Pain (7 - 10)  simethicone 80 milliGRAM(s) Chew four times a day PRN Gas Pain  sodium chloride 3%  Inhalation 4 milliLiter(s) Inhalation every 8 hours PRN with aerobica/airway clearance      LABS:    The Labs were reviewed by me   The Radiology was reviewed by me    EKG tracing reviewed by me    09-30    132<L>  |  93<L>  |  10  ----------------------------<  71  4.0   |  31  |  0.33<L>  09-29    132<L>  |  92<L>  |  10  ----------------------------<  145<H>  3.5   |  32<H>  |  0.33<L>  09-28    133<L>  |  94<L>  |  13  ----------------------------<  94  4.2   |  29  |  0.31<L>    Ca    9.1      30 Sep 2022 06:34  Ca    9.1      29 Sep 2022 05:00  Ca    9.7      28 Sep 2022 02:13  Phos  3.1     09-30  Mg     1.70     09-30    TPro  6.5  /  Alb  3.1<L>  /  TBili  0.2  /  DBili  x   /  AST  40<H>  /  ALT  41<H>  /  AlkPhos  158<H>  09-30  TPro  5.9<L>  /  Alb  3.0<L>  /  TBili  0.3  /  DBili  x   /  AST  42<H>  /  ALT  35<H>  /  AlkPhos  143<H>  09-29  TPro  6.7  /  Alb  3.3  /  TBili  0.2  /  DBili  x   /  AST  31  /  ALT  32  /  AlkPhos  150<H>  09-28    Magnesium, Serum: 1.70 mg/dL (09-30-22 @ 06:34)  Magnesium, Serum: 1.90 mg/dL (09-29-22 @ 05:00)    Phosphorus Level, Serum: 3.1 mg/dL (09-30-22 @ 06:34)  Phosphorus Level, Serum: 3.1 mg/dL (09-29-22 @ 05:00)                                              13.8   9.92  )-----------( 171      ( 30 Sep 2022 06:34 )             42.3                         12.4   8.57  )-----------( 154      ( 29 Sep 2022 05:00 )             38.5                         13.1   9.61  )-----------( 178      ( 28 Sep 2022 02:13 )             41.3     CAPILLARY BLOOD GLUCOSE      POCT Blood Glucose.: 211 mg/dL (30 Sep 2022 12:01)  POCT Blood Glucose.: 78 mg/dL (30 Sep 2022 07:20)  POCT Blood Glucose.: 97 mg/dL (29 Sep 2022 23:31)  POCT Blood Glucose.: 147 mg/dL (29 Sep 2022 22:05)  POCT Blood Glucose.: 81 mg/dL (29 Sep 2022 21:38)  POCT Blood Glucose.: 178 mg/dL (29 Sep 2022 21:36)  POCT Blood Glucose.: 58 mg/dL (29 Sep 2022 21:32)  POCT Blood Glucose.: 158 mg/dL (29 Sep 2022 17:33)  POCT Blood Glucose.: 347 mg/dL (29 Sep 2022 12:17)  POCT Blood Glucose.: 340 mg/dL (29 Sep 2022 12:17)        MICROBIOLOGY:     RADIOLOGY:  [ ] Reviewed and interpreted by me    Point of Care Ultrasound Findings:    PFT:    EKG: CHIEF COMPLAINT:Patient is a 67y old  Female who presents with a chief complaint of abdominal pain (30 Sep 2022 08:10)      Interval Events: no overnight events, Patient with continued productive sputum. unchanged from yesterday. Abdominal pain has resolved. Patient denies fevers, chills, chest pain, nausea, abdominal pain, diarrhea, constipation, dysuria, leg swelling, headache, light headedness    REVIEW OF SYSTEMS:  [x] All other systems negative except per HPI   [ ] Unable to assess ROS because ________    OBJECTIVE:  ICU Vital Signs Last 24 Hrs  T(C): 36.3 (30 Sep 2022 06:20), Max: 36.6 (29 Sep 2022 21:26)  T(F): 97.3 (30 Sep 2022 06:20), Max: 97.9 (29 Sep 2022 21:26)  HR: 79 (30 Sep 2022 06:20) (65 - 89)  BP: 125/56 (30 Sep 2022 06:20) (125/56 - 144/77)  BP(mean): --  ABP: --  ABP(mean): --  RR: 17 (30 Sep 2022 06:20) (17 - 19)  SpO2: 99% (30 Sep 2022 06:20) (96% - 99%)    O2 Parameters below as of 30 Sep 2022 06:20  Patient On (Oxygen Delivery Method): nasal cannula  O2 Flow (L/min): 2.5        Mode: standby    09-29 @ 07:01  -  09-30 @ 07:00  --------------------------------------------------------  IN: 0 mL / OUT: 1000 mL / NET: -1000 mL        PHYSICAL EXAM:  GENERAL: NAD, well-groomed, well-developed  HEAD:  Atraumatic, Normocephalic  EYES: EOMI, PERRLA, conjunctiva and sclera clear  ENMT: No tonsillar erythema, exudates, or enlargement; Moist mucous membranes, Good dentition, No lesions  NECK: Supple, No JVD, Normal thyroid  CHEST/LUNG: Clear to auscultation bilaterally; No rales, rhonchi, wheezing, or rubs  HEART: Regular rate and rhythm; No murmurs, rubs, or gallops  ABDOMEN: Soft, Nontender, Nondistended; Bowel sounds present  VASCULAR:  2+ Peripheral Pulses, No clubbing, cyanosis, or edema  LYMPH: No lymphadenopathy noted  SKIN: No rashes or lesions  NERVOUS SYSTEM:  Alert & Oriented X3, Good concentration; Motor Strength 5/5 B/L upper and lower extremities; DTRs 2+ intact and symmetric    HOSPITAL MEDICATIONS:  MEDICATIONS  (STANDING):  acetaminophen     Tablet .. 500 milliGRAM(s) Oral every 6 hours  albuterol/ipratropium for Nebulization 3 milliLiter(s) Nebulizer every 6 hours  amLODIPine   Tablet 5 milliGRAM(s) Oral daily  budesonide 160 MICROgram(s)/formoterol 4.5 MICROgram(s) Inhaler 2 Puff(s) Inhalation two times a day  dextrose 5%. 1000 milliLiter(s) (50 mL/Hr) IV Continuous <Continuous>  dextrose 5%. 1000 milliLiter(s) (100 mL/Hr) IV Continuous <Continuous>  dextrose 50% Injectable 25 Gram(s) IV Push once  dextrose 50% Injectable 12.5 Gram(s) IV Push once  dextrose 50% Injectable 25 Gram(s) IV Push once  enoxaparin Injectable 30 milliGRAM(s) SubCutaneous every 24 hours  ferrous    sulfate 325 milliGRAM(s) Oral daily  fluticasone propionate 50 MICROgram(s)/spray Nasal Spray 1 Spray(s) Both Nostrils two times a day  glucagon  Injectable 1 milliGRAM(s) IntraMuscular once  influenza  Vaccine (HIGH DOSE) 0.7 milliLiter(s) IntraMuscular once  insulin glargine Injectable (LANTUS) 6 Unit(s) SubCutaneous at bedtime  insulin lispro (ADMELOG) corrective regimen sliding scale   SubCutaneous three times a day before meals  insulin lispro (ADMELOG) corrective regimen sliding scale   SubCutaneous at bedtime  insulin lispro Injectable (ADMELOG) 2 Unit(s) SubCutaneous three times a day before meals  lidocaine/prilocaine Cream 1 Application(s) Topical daily  meropenem  IVPB 1000 milliGRAM(s) IV Intermittent every 8 hours  metoprolol succinate ER 50 milliGRAM(s) Oral daily  montelukast 10 milliGRAM(s) Oral daily  multivitamin 1 Tablet(s) Oral daily  nystatin    Suspension [id number] Unit(s) Oral daily  pantoprazole    Tablet 40 milliGRAM(s) Oral before breakfast  senna 2 Tablet(s) Oral at bedtime  tiotropium 18 MICROgram(s) Capsule 1 Capsule(s) Inhalation daily  venlafaxine XR. 37.5 milliGRAM(s) Oral daily    MEDICATIONS  (PRN):  ALBUTerol    90 MICROgram(s) HFA Inhaler 2 Puff(s) Inhalation every 6 hours PRN Shortness of Breath and/or Wheezing  dextrose Oral Gel 15 Gram(s) Oral once PRN Blood Glucose LESS THAN 70 milliGRAM(s)/deciliter  ketorolac   Injectable 15 milliGRAM(s) IV Push every 8 hours PRN Severe Pain (7 - 10)  simethicone 80 milliGRAM(s) Chew four times a day PRN Gas Pain  sodium chloride 3%  Inhalation 4 milliLiter(s) Inhalation every 8 hours PRN with aerobica/airway clearance      LABS:    The Labs were reviewed by me   The Radiology was reviewed by me    EKG tracing reviewed by me    09-30    132<L>  |  93<L>  |  10  ----------------------------<  71  4.0   |  31  |  0.33<L>  09-29    132<L>  |  92<L>  |  10  ----------------------------<  145<H>  3.5   |  32<H>  |  0.33<L>  09-28    133<L>  |  94<L>  |  13  ----------------------------<  94  4.2   |  29  |  0.31<L>    Ca    9.1      30 Sep 2022 06:34  Ca    9.1      29 Sep 2022 05:00  Ca    9.7      28 Sep 2022 02:13  Phos  3.1     09-30  Mg     1.70     09-30    TPro  6.5  /  Alb  3.1<L>  /  TBili  0.2  /  DBili  x   /  AST  40<H>  /  ALT  41<H>  /  AlkPhos  158<H>  09-30  TPro  5.9<L>  /  Alb  3.0<L>  /  TBili  0.3  /  DBili  x   /  AST  42<H>  /  ALT  35<H>  /  AlkPhos  143<H>  09-29  TPro  6.7  /  Alb  3.3  /  TBili  0.2  /  DBili  x   /  AST  31  /  ALT  32  /  AlkPhos  150<H>  09-28    Magnesium, Serum: 1.70 mg/dL (09-30-22 @ 06:34)  Magnesium, Serum: 1.90 mg/dL (09-29-22 @ 05:00)    Phosphorus Level, Serum: 3.1 mg/dL (09-30-22 @ 06:34)  Phosphorus Level, Serum: 3.1 mg/dL (09-29-22 @ 05:00)                                              13.8   9.92  )-----------( 171      ( 30 Sep 2022 06:34 )             42.3                         12.4   8.57  )-----------( 154      ( 29 Sep 2022 05:00 )             38.5                         13.1   9.61  )-----------( 178      ( 28 Sep 2022 02:13 )             41.3     CAPILLARY BLOOD GLUCOSE      POCT Blood Glucose.: 211 mg/dL (30 Sep 2022 12:01)  POCT Blood Glucose.: 78 mg/dL (30 Sep 2022 07:20)  POCT Blood Glucose.: 97 mg/dL (29 Sep 2022 23:31)  POCT Blood Glucose.: 147 mg/dL (29 Sep 2022 22:05)  POCT Blood Glucose.: 81 mg/dL (29 Sep 2022 21:38)  POCT Blood Glucose.: 178 mg/dL (29 Sep 2022 21:36)  POCT Blood Glucose.: 58 mg/dL (29 Sep 2022 21:32)  POCT Blood Glucose.: 158 mg/dL (29 Sep 2022 17:33)  POCT Blood Glucose.: 347 mg/dL (29 Sep 2022 12:17)  POCT Blood Glucose.: 340 mg/dL (29 Sep 2022 12:17)        MICROBIOLOGY:     RADIOLOGY:  [ ] Reviewed and interpreted by me    Point of Care Ultrasound Findings:    PFT:    EKG: CHIEF COMPLAINT:Patient is a 67y old  Female who presents with a chief complaint of abdominal pain (30 Sep 2022 08:10)      Interval Events: no overnight events, Patient with continued productive sputum. unchanged from yesterday. Abdominal pain has resolved. Patient denies fevers, chills, chest pain, nausea, abdominal pain, diarrhea, constipation, dysuria, leg swelling, headache, light headedness    REVIEW OF SYSTEMS:  [x] All other systems negative except per HPI   [ ] Unable to assess ROS because ________    OBJECTIVE:  ICU Vital Signs Last 24 Hrs  T(C): 36.3 (30 Sep 2022 06:20), Max: 36.6 (29 Sep 2022 21:26)  T(F): 97.3 (30 Sep 2022 06:20), Max: 97.9 (29 Sep 2022 21:26)  HR: 79 (30 Sep 2022 06:20) (65 - 89)  BP: 125/56 (30 Sep 2022 06:20) (125/56 - 144/77)  BP(mean): --  ABP: --  ABP(mean): --  RR: 17 (30 Sep 2022 06:20) (17 - 19)  SpO2: 99% (30 Sep 2022 06:20) (96% - 99%)    O2 Parameters below as of 30 Sep 2022 06:20  Patient On (Oxygen Delivery Method): nasal cannula  O2 Flow (L/min): 2.5        Mode: standby    09-29 @ 07:01  -  09-30 @ 07:00  --------------------------------------------------------  IN: 0 mL / OUT: 1000 mL / NET: -1000 mL        PHYSICAL EXAM:  GENERAL: NAD, well-groomed, well-developed  HEAD:  Atraumatic, Normocephalic  EYES: EOMI, PERRLA, conjunctiva and sclera clear  ENMT: No tonsillar erythema, exudates, or enlargement; Moist mucous membranes, Good dentition, No lesions  NECK: Supple, No JVD, Normal thyroid  CHEST/LUNG: diffuse coarse rhonchi  HEART: Regular rate and rhythm; No murmurs, rubs, or gallops  ABDOMEN: Soft, Nontender, Nondistended; Bowel sounds present  VASCULAR:  2+ Peripheral Pulses, No clubbing, cyanosis, or edema  LYMPH: No lymphadenopathy noted  SKIN: No rashes or lesions  NERVOUS SYSTEM:  Alert & Oriented X3, Good concentration; Motor Strength 5/5 B/L upper and lower extremities; DTRs 2+ intact and symmetric    HOSPITAL MEDICATIONS:  MEDICATIONS  (STANDING):  acetaminophen     Tablet .. 500 milliGRAM(s) Oral every 6 hours  albuterol/ipratropium for Nebulization 3 milliLiter(s) Nebulizer every 6 hours  amLODIPine   Tablet 5 milliGRAM(s) Oral daily  budesonide 160 MICROgram(s)/formoterol 4.5 MICROgram(s) Inhaler 2 Puff(s) Inhalation two times a day  dextrose 5%. 1000 milliLiter(s) (50 mL/Hr) IV Continuous <Continuous>  dextrose 5%. 1000 milliLiter(s) (100 mL/Hr) IV Continuous <Continuous>  dextrose 50% Injectable 25 Gram(s) IV Push once  dextrose 50% Injectable 12.5 Gram(s) IV Push once  dextrose 50% Injectable 25 Gram(s) IV Push once  enoxaparin Injectable 30 milliGRAM(s) SubCutaneous every 24 hours  ferrous    sulfate 325 milliGRAM(s) Oral daily  fluticasone propionate 50 MICROgram(s)/spray Nasal Spray 1 Spray(s) Both Nostrils two times a day  glucagon  Injectable 1 milliGRAM(s) IntraMuscular once  influenza  Vaccine (HIGH DOSE) 0.7 milliLiter(s) IntraMuscular once  insulin glargine Injectable (LANTUS) 6 Unit(s) SubCutaneous at bedtime  insulin lispro (ADMELOG) corrective regimen sliding scale   SubCutaneous three times a day before meals  insulin lispro (ADMELOG) corrective regimen sliding scale   SubCutaneous at bedtime  insulin lispro Injectable (ADMELOG) 2 Unit(s) SubCutaneous three times a day before meals  lidocaine/prilocaine Cream 1 Application(s) Topical daily  meropenem  IVPB 1000 milliGRAM(s) IV Intermittent every 8 hours  metoprolol succinate ER 50 milliGRAM(s) Oral daily  montelukast 10 milliGRAM(s) Oral daily  multivitamin 1 Tablet(s) Oral daily  nystatin    Suspension [id number] Unit(s) Oral daily  pantoprazole    Tablet 40 milliGRAM(s) Oral before breakfast  senna 2 Tablet(s) Oral at bedtime  tiotropium 18 MICROgram(s) Capsule 1 Capsule(s) Inhalation daily  venlafaxine XR. 37.5 milliGRAM(s) Oral daily    MEDICATIONS  (PRN):  ALBUTerol    90 MICROgram(s) HFA Inhaler 2 Puff(s) Inhalation every 6 hours PRN Shortness of Breath and/or Wheezing  dextrose Oral Gel 15 Gram(s) Oral once PRN Blood Glucose LESS THAN 70 milliGRAM(s)/deciliter  ketorolac   Injectable 15 milliGRAM(s) IV Push every 8 hours PRN Severe Pain (7 - 10)  simethicone 80 milliGRAM(s) Chew four times a day PRN Gas Pain  sodium chloride 3%  Inhalation 4 milliLiter(s) Inhalation every 8 hours PRN with aerobica/airway clearance      LABS:    The Labs were reviewed by me   The Radiology was reviewed by me    EKG tracing reviewed by me    09-30    132<L>  |  93<L>  |  10  ----------------------------<  71  4.0   |  31  |  0.33<L>  09-29    132<L>  |  92<L>  |  10  ----------------------------<  145<H>  3.5   |  32<H>  |  0.33<L>  09-28    133<L>  |  94<L>  |  13  ----------------------------<  94  4.2   |  29  |  0.31<L>    Ca    9.1      30 Sep 2022 06:34  Ca    9.1      29 Sep 2022 05:00  Ca    9.7      28 Sep 2022 02:13  Phos  3.1     09-30  Mg     1.70     09-30    TPro  6.5  /  Alb  3.1<L>  /  TBili  0.2  /  DBili  x   /  AST  40<H>  /  ALT  41<H>  /  AlkPhos  158<H>  09-30  TPro  5.9<L>  /  Alb  3.0<L>  /  TBili  0.3  /  DBili  x   /  AST  42<H>  /  ALT  35<H>  /  AlkPhos  143<H>  09-29  TPro  6.7  /  Alb  3.3  /  TBili  0.2  /  DBili  x   /  AST  31  /  ALT  32  /  AlkPhos  150<H>  09-28    Magnesium, Serum: 1.70 mg/dL (09-30-22 @ 06:34)  Magnesium, Serum: 1.90 mg/dL (09-29-22 @ 05:00)    Phosphorus Level, Serum: 3.1 mg/dL (09-30-22 @ 06:34)  Phosphorus Level, Serum: 3.1 mg/dL (09-29-22 @ 05:00)                                              13.8   9.92  )-----------( 171      ( 30 Sep 2022 06:34 )             42.3                         12.4   8.57  )-----------( 154      ( 29 Sep 2022 05:00 )             38.5                         13.1   9.61  )-----------( 178      ( 28 Sep 2022 02:13 )             41.3     CAPILLARY BLOOD GLUCOSE      POCT Blood Glucose.: 211 mg/dL (30 Sep 2022 12:01)  POCT Blood Glucose.: 78 mg/dL (30 Sep 2022 07:20)  POCT Blood Glucose.: 97 mg/dL (29 Sep 2022 23:31)  POCT Blood Glucose.: 147 mg/dL (29 Sep 2022 22:05)  POCT Blood Glucose.: 81 mg/dL (29 Sep 2022 21:38)  POCT Blood Glucose.: 178 mg/dL (29 Sep 2022 21:36)  POCT Blood Glucose.: 58 mg/dL (29 Sep 2022 21:32)  POCT Blood Glucose.: 158 mg/dL (29 Sep 2022 17:33)  POCT Blood Glucose.: 347 mg/dL (29 Sep 2022 12:17)  POCT Blood Glucose.: 340 mg/dL (29 Sep 2022 12:17)        MICROBIOLOGY:     RADIOLOGY:  [ ] Reviewed and interpreted by me    Point of Care Ultrasound Findings:    PFT:    EKG: CHIEF COMPLAINT:Patient is a 67y old  Female who presents with a chief complaint of abdominal pain (30 Sep 2022 08:10)      Interval Events: no overnight events, Patient with continued productive sputum. unchanged from yesterday. Abdominal pain has resolved. Patient denies fevers, chills, chest pain, nausea, abdominal pain, diarrhea, constipation, dysuria, leg swelling, headache, light headedness    REVIEW OF SYSTEMS:  [x] All other systems negative except per HPI   [ ] Unable to assess ROS because ________    OBJECTIVE:  ICU Vital Signs Last 24 Hrs  T(C): 36.3 (30 Sep 2022 06:20), Max: 36.6 (29 Sep 2022 21:26)  T(F): 97.3 (30 Sep 2022 06:20), Max: 97.9 (29 Sep 2022 21:26)  HR: 79 (30 Sep 2022 06:20) (65 - 89)  BP: 125/56 (30 Sep 2022 06:20) (125/56 - 144/77)  BP(mean): --  ABP: --  ABP(mean): --  RR: 17 (30 Sep 2022 06:20) (17 - 19)  SpO2: 99% (30 Sep 2022 06:20) (96% - 99%)    O2 Parameters below as of 30 Sep 2022 06:20  Patient On (Oxygen Delivery Method): nasal cannula  O2 Flow (L/min): 2.5        Mode: standby    09-29 @ 07:01  -  09-30 @ 07:00  --------------------------------------------------------  IN: 0 mL / OUT: 1000 mL / NET: -1000 mL        PHYSICAL EXAM:  GENERAL: NAD, well-groomed, well-developed  HEAD:  Atraumatic, Normocephalic  EYES: EOMI, PERRLA, conjunctiva and sclera clear  ENMT: No tonsillar erythema, exudates, or enlargement; Moist mucous membranes, Good dentition, No lesions  NECK: Supple, No JVD, Normal thyroid  CHEST/LUNG: diffuse coarse rhonchi  HEART: Regular rate and rhythm; No murmurs, rubs, or gallops  ABDOMEN: Soft, Nontender, Nondistended; Bowel sounds present  VASCULAR:  2+ Peripheral Pulses, No clubbing, cyanosis, or edema  LYMPH: No lymphadenopathy noted  SKIN: No rashes or lesions  NERVOUS SYSTEM:  Alert & Oriented X3, Good concentration; Motor Strength 5/5 B/L upper and lower extremities; DTRs 2+ intact and symmetric    HOSPITAL MEDICATIONS:  MEDICATIONS  (STANDING):  acetaminophen     Tablet .. 500 milliGRAM(s) Oral every 6 hours  albuterol/ipratropium for Nebulization 3 milliLiter(s) Nebulizer every 6 hours  amLODIPine   Tablet 5 milliGRAM(s) Oral daily  budesonide 160 MICROgram(s)/formoterol 4.5 MICROgram(s) Inhaler 2 Puff(s) Inhalation two times a day  dextrose 5%. 1000 milliLiter(s) (50 mL/Hr) IV Continuous <Continuous>  dextrose 5%. 1000 milliLiter(s) (100 mL/Hr) IV Continuous <Continuous>  dextrose 50% Injectable 25 Gram(s) IV Push once  dextrose 50% Injectable 12.5 Gram(s) IV Push once  dextrose 50% Injectable 25 Gram(s) IV Push once  enoxaparin Injectable 30 milliGRAM(s) SubCutaneous every 24 hours  ferrous    sulfate 325 milliGRAM(s) Oral daily  fluticasone propionate 50 MICROgram(s)/spray Nasal Spray 1 Spray(s) Both Nostrils two times a day  glucagon  Injectable 1 milliGRAM(s) IntraMuscular once  influenza  Vaccine (HIGH DOSE) 0.7 milliLiter(s) IntraMuscular once  insulin glargine Injectable (LANTUS) 6 Unit(s) SubCutaneous at bedtime  insulin lispro (ADMELOG) corrective regimen sliding scale   SubCutaneous three times a day before meals  insulin lispro (ADMELOG) corrective regimen sliding scale   SubCutaneous at bedtime  insulin lispro Injectable (ADMELOG) 2 Unit(s) SubCutaneous three times a day before meals  lidocaine/prilocaine Cream 1 Application(s) Topical daily  meropenem  IVPB 1000 milliGRAM(s) IV Intermittent every 8 hours  metoprolol succinate ER 50 milliGRAM(s) Oral daily  montelukast 10 milliGRAM(s) Oral daily  multivitamin 1 Tablet(s) Oral daily  nystatin    Suspension [id number] Unit(s) Oral daily  pantoprazole    Tablet 40 milliGRAM(s) Oral before breakfast  senna 2 Tablet(s) Oral at bedtime  tiotropium 18 MICROgram(s) Capsule 1 Capsule(s) Inhalation daily  venlafaxine XR. 37.5 milliGRAM(s) Oral daily    MEDICATIONS  (PRN):  ALBUTerol    90 MICROgram(s) HFA Inhaler 2 Puff(s) Inhalation every 6 hours PRN Shortness of Breath and/or Wheezing  dextrose Oral Gel 15 Gram(s) Oral once PRN Blood Glucose LESS THAN 70 milliGRAM(s)/deciliter  ketorolac   Injectable 15 milliGRAM(s) IV Push every 8 hours PRN Severe Pain (7 - 10)  simethicone 80 milliGRAM(s) Chew four times a day PRN Gas Pain  sodium chloride 3%  Inhalation 4 milliLiter(s) Inhalation every 8 hours PRN with aerobica/airway clearance      LABS:    The Labs were reviewed by me   The Radiology was reviewed by me      09-30    132<L>  |  93<L>  |  10  ----------------------------<  71  4.0   |  31  |  0.33<L>  09-29    132<L>  |  92<L>  |  10  ----------------------------<  145<H>  3.5   |  32<H>  |  0.33<L>  09-28    133<L>  |  94<L>  |  13  ----------------------------<  94  4.2   |  29  |  0.31<L>    Ca    9.1      30 Sep 2022 06:34  Ca    9.1      29 Sep 2022 05:00  Ca    9.7      28 Sep 2022 02:13  Phos  3.1     09-30  Mg     1.70     09-30    TPro  6.5  /  Alb  3.1<L>  /  TBili  0.2  /  DBili  x   /  AST  40<H>  /  ALT  41<H>  /  AlkPhos  158<H>  09-30  TPro  5.9<L>  /  Alb  3.0<L>  /  TBili  0.3  /  DBili  x   /  AST  42<H>  /  ALT  35<H>  /  AlkPhos  143<H>  09-29  TPro  6.7  /  Alb  3.3  /  TBili  0.2  /  DBili  x   /  AST  31  /  ALT  32  /  AlkPhos  150<H>  09-28    Magnesium, Serum: 1.70 mg/dL (09-30-22 @ 06:34)  Magnesium, Serum: 1.90 mg/dL (09-29-22 @ 05:00)    Phosphorus Level, Serum: 3.1 mg/dL (09-30-22 @ 06:34)  Phosphorus Level, Serum: 3.1 mg/dL (09-29-22 @ 05:00)                                              13.8   9.92  )-----------( 171      ( 30 Sep 2022 06:34 )             42.3                         12.4   8.57  )-----------( 154      ( 29 Sep 2022 05:00 )             38.5                         13.1   9.61  )-----------( 178      ( 28 Sep 2022 02:13 )             41.3     CAPILLARY BLOOD GLUCOSE      POCT Blood Glucose.: 211 mg/dL (30 Sep 2022 12:01)  POCT Blood Glucose.: 78 mg/dL (30 Sep 2022 07:20)  POCT Blood Glucose.: 97 mg/dL (29 Sep 2022 23:31)  POCT Blood Glucose.: 147 mg/dL (29 Sep 2022 22:05)  POCT Blood Glucose.: 81 mg/dL (29 Sep 2022 21:38)  POCT Blood Glucose.: 178 mg/dL (29 Sep 2022 21:36)  POCT Blood Glucose.: 58 mg/dL (29 Sep 2022 21:32)  POCT Blood Glucose.: 158 mg/dL (29 Sep 2022 17:33)  POCT Blood Glucose.: 347 mg/dL (29 Sep 2022 12:17)  POCT Blood Glucose.: 340 mg/dL (29 Sep 2022 12:17)        MICROBIOLOGY:     RADIOLOGY:  [ ] Reviewed and interpreted by me    Point of Care Ultrasound Findings:    PFT:    EKG:

## 2022-10-01 LAB
ALBUMIN SERPL ELPH-MCNC: 3 G/DL — LOW (ref 3.3–5)
ALP SERPL-CCNC: 157 U/L — HIGH (ref 40–120)
ALT FLD-CCNC: 36 U/L — HIGH (ref 4–33)
ANION GAP SERPL CALC-SCNC: 7 MMOL/L — SIGNIFICANT CHANGE UP (ref 7–14)
AST SERPL-CCNC: 33 U/L — HIGH (ref 4–32)
BILIRUB SERPL-MCNC: 0.2 MG/DL — SIGNIFICANT CHANGE UP (ref 0.2–1.2)
BUN SERPL-MCNC: 6 MG/DL — LOW (ref 7–23)
CALCIUM SERPL-MCNC: 9.3 MG/DL — SIGNIFICANT CHANGE UP (ref 8.4–10.5)
CHLORIDE SERPL-SCNC: 93 MMOL/L — LOW (ref 98–107)
CHOLEST SERPL-MCNC: 150 MG/DL — SIGNIFICANT CHANGE UP
CO2 SERPL-SCNC: 33 MMOL/L — HIGH (ref 22–31)
CREAT SERPL-MCNC: 0.28 MG/DL — LOW (ref 0.5–1.3)
EGFR: 120 ML/MIN/1.73M2 — SIGNIFICANT CHANGE UP
GLUCOSE BLDC GLUCOMTR-MCNC: 106 MG/DL — HIGH (ref 70–99)
GLUCOSE BLDC GLUCOMTR-MCNC: 177 MG/DL — HIGH (ref 70–99)
GLUCOSE BLDC GLUCOMTR-MCNC: 182 MG/DL — HIGH (ref 70–99)
GLUCOSE BLDC GLUCOMTR-MCNC: 248 MG/DL — HIGH (ref 70–99)
GLUCOSE BLDC GLUCOMTR-MCNC: 267 MG/DL — HIGH (ref 70–99)
GLUCOSE BLDC GLUCOMTR-MCNC: 292 MG/DL — HIGH (ref 70–99)
GLUCOSE BLDC GLUCOMTR-MCNC: 306 MG/DL — HIGH (ref 70–99)
GLUCOSE SERPL-MCNC: 87 MG/DL — SIGNIFICANT CHANGE UP (ref 70–99)
HCT VFR BLD CALC: 41.1 % — SIGNIFICANT CHANGE UP (ref 34.5–45)
HDLC SERPL-MCNC: 68 MG/DL — SIGNIFICANT CHANGE UP
HGB BLD-MCNC: 13 G/DL — SIGNIFICANT CHANGE UP (ref 11.5–15.5)
INR BLD: 0.99 RATIO — SIGNIFICANT CHANGE UP (ref 0.88–1.16)
LIPID PNL WITH DIRECT LDL SERPL: 60 MG/DL — SIGNIFICANT CHANGE UP
MAGNESIUM SERPL-MCNC: 1.8 MG/DL — SIGNIFICANT CHANGE UP (ref 1.6–2.6)
MCHC RBC-ENTMCNC: 27.1 PG — SIGNIFICANT CHANGE UP (ref 27–34)
MCHC RBC-ENTMCNC: 31.6 GM/DL — LOW (ref 32–36)
MCV RBC AUTO: 85.8 FL — SIGNIFICANT CHANGE UP (ref 80–100)
MELD SCORE WITH DIALYSIS: 23 POINTS — SIGNIFICANT CHANGE UP
MELD SCORE WITHOUT DIALYSIS: 6 POINTS — SIGNIFICANT CHANGE UP
NON HDL CHOLESTEROL: 82 MG/DL — SIGNIFICANT CHANGE UP
NRBC # BLD: 0 /100 WBCS — SIGNIFICANT CHANGE UP (ref 0–0)
NRBC # FLD: 0 K/UL — SIGNIFICANT CHANGE UP (ref 0–0)
PHOSPHATE SERPL-MCNC: 2.5 MG/DL — SIGNIFICANT CHANGE UP (ref 2.5–4.5)
PLATELET # BLD AUTO: 171 K/UL — SIGNIFICANT CHANGE UP (ref 150–400)
POTASSIUM SERPL-MCNC: 4.1 MMOL/L — SIGNIFICANT CHANGE UP (ref 3.5–5.3)
POTASSIUM SERPL-SCNC: 4.1 MMOL/L — SIGNIFICANT CHANGE UP (ref 3.5–5.3)
PROT SERPL-MCNC: 6.1 G/DL — SIGNIFICANT CHANGE UP (ref 6–8.3)
PROTHROM AB SERPL-ACNC: 11.5 SEC — SIGNIFICANT CHANGE UP (ref 10.5–13.4)
RBC # BLD: 4.79 M/UL — SIGNIFICANT CHANGE UP (ref 3.8–5.2)
RBC # FLD: 13.1 % — SIGNIFICANT CHANGE UP (ref 10.3–14.5)
SODIUM SERPL-SCNC: 133 MMOL/L — LOW (ref 135–145)
TRIGL SERPL-MCNC: 110 MG/DL — SIGNIFICANT CHANGE UP
WBC # BLD: 8.34 K/UL — SIGNIFICANT CHANGE UP (ref 3.8–10.5)
WBC # FLD AUTO: 8.34 K/UL — SIGNIFICANT CHANGE UP (ref 3.8–10.5)

## 2022-10-01 PROCEDURE — 99233 SBSQ HOSP IP/OBS HIGH 50: CPT | Mod: GC

## 2022-10-01 RX ORDER — INSULIN GLARGINE 100 [IU]/ML
5 INJECTION, SOLUTION SUBCUTANEOUS AT BEDTIME
Refills: 0 | Status: DISCONTINUED | OUTPATIENT
Start: 2022-10-01 | End: 2022-10-03

## 2022-10-01 RX ADMIN — AMLODIPINE BESYLATE 5 MILLIGRAM(S): 2.5 TABLET ORAL at 05:31

## 2022-10-01 RX ADMIN — PANTOPRAZOLE SODIUM 40 MILLIGRAM(S): 20 TABLET, DELAYED RELEASE ORAL at 06:17

## 2022-10-01 RX ADMIN — Medication 325 MILLIGRAM(S): at 12:00

## 2022-10-01 RX ADMIN — Medication 50 MILLIGRAM(S): at 05:32

## 2022-10-01 RX ADMIN — MEROPENEM 100 MILLIGRAM(S): 1 INJECTION INTRAVENOUS at 23:02

## 2022-10-01 RX ADMIN — Medication 500 MILLIGRAM(S): at 11:56

## 2022-10-01 RX ADMIN — Medication 500 MILLIGRAM(S): at 01:03

## 2022-10-01 RX ADMIN — Medication 3 MILLILITER(S): at 00:33

## 2022-10-01 RX ADMIN — ENOXAPARIN SODIUM 30 MILLIGRAM(S): 100 INJECTION SUBCUTANEOUS at 15:33

## 2022-10-01 RX ADMIN — POLYETHYLENE GLYCOL 3350 17 GRAM(S): 17 POWDER, FOR SOLUTION ORAL at 05:33

## 2022-10-01 RX ADMIN — TIOTROPIUM BROMIDE 1 CAPSULE(S): 18 CAPSULE ORAL; RESPIRATORY (INHALATION) at 09:48

## 2022-10-01 RX ADMIN — MEROPENEM 100 MILLIGRAM(S): 1 INJECTION INTRAVENOUS at 15:32

## 2022-10-01 RX ADMIN — LIDOCAINE AND PRILOCAINE CREAM 1 APPLICATION(S): 25; 25 CREAM TOPICAL at 11:57

## 2022-10-01 RX ADMIN — INSULIN GLARGINE 5 UNIT(S): 100 INJECTION, SOLUTION SUBCUTANEOUS at 23:02

## 2022-10-01 RX ADMIN — Medication 1 TABLET(S): at 11:57

## 2022-10-01 RX ADMIN — Medication 3 MILLILITER(S): at 22:20

## 2022-10-01 RX ADMIN — BUDESONIDE AND FORMOTEROL FUMARATE DIHYDRATE 2 PUFF(S): 160; 4.5 AEROSOL RESPIRATORY (INHALATION) at 23:16

## 2022-10-01 RX ADMIN — Medication 500 MILLIGRAM(S): at 23:12

## 2022-10-01 RX ADMIN — Medication 5 MILLIGRAM(S): at 23:03

## 2022-10-01 RX ADMIN — Medication 2 UNIT(S): at 09:42

## 2022-10-01 RX ADMIN — Medication 1 SPRAY(S): at 05:34

## 2022-10-01 RX ADMIN — Medication 1 SPRAY(S): at 17:57

## 2022-10-01 RX ADMIN — Medication 3 MILLILITER(S): at 10:50

## 2022-10-01 RX ADMIN — BUDESONIDE AND FORMOTEROL FUMARATE DIHYDRATE 2 PUFF(S): 160; 4.5 AEROSOL RESPIRATORY (INHALATION) at 09:46

## 2022-10-01 RX ADMIN — Medication 500 MILLIGRAM(S): at 17:59

## 2022-10-01 RX ADMIN — COLISTIMETHATE SODIUM 150 MILLIGRAM(S) CBA: 150 INJECTION INTRAMUSCULAR; INTRAVENOUS at 00:30

## 2022-10-01 RX ADMIN — Medication 500000 UNIT(S): at 12:01

## 2022-10-01 RX ADMIN — MONTELUKAST 10 MILLIGRAM(S): 4 TABLET, CHEWABLE ORAL at 11:58

## 2022-10-01 RX ADMIN — POLYETHYLENE GLYCOL 3350 17 GRAM(S): 17 POWDER, FOR SOLUTION ORAL at 17:59

## 2022-10-01 RX ADMIN — Medication 2: at 12:54

## 2022-10-01 RX ADMIN — Medication 2 UNIT(S): at 12:55

## 2022-10-01 RX ADMIN — Medication 37.5 MILLIGRAM(S): at 11:57

## 2022-10-01 RX ADMIN — COLISTIMETHATE SODIUM 150 MILLIGRAM(S) CBA: 150 INJECTION INTRAMUSCULAR; INTRAVENOUS at 11:17

## 2022-10-01 RX ADMIN — MEROPENEM 100 MILLIGRAM(S): 1 INJECTION INTRAVENOUS at 05:29

## 2022-10-01 RX ADMIN — Medication 500 MILLIGRAM(S): at 06:18

## 2022-10-01 RX ADMIN — Medication 2 UNIT(S): at 17:56

## 2022-10-01 NOTE — PHYSICAL THERAPY INITIAL EVALUATION ADULT - ADDITIONAL COMMENTS
Pt states she lives in a first floor apartment with family with 3 steps to enter. Prior to admission pt was ambulating independently, owns a rolling walker but does not always use it to ambulate. Pt required some assistance for ADLs with bathing but can perform other ADLs independently. Pt states she is on 2.5L O2 at home.    Following evaluation, pt was left semireclined in bed in no distress, all lines in tact, call bell in reach. RN aware.

## 2022-10-01 NOTE — PHYSICAL THERAPY INITIAL EVALUATION ADULT - PATIENT PROFILE REVIEW, REHAB EVAL
Activity - Ambulate as tolerated. Pt cleared for PT evaluation prior to session by JOSUE Chowdary./yes

## 2022-10-01 NOTE — PROGRESS NOTE ADULT - PROBLEM SELECTOR PLAN 5
- possibly 2/2 PCD  - labile blood glucoses  - Lantus 6U, premeal 2U, proceed with caution monitoring frequently   - insulin sliding scale  - A1c 9.4%  - lawson c/s

## 2022-10-01 NOTE — CHART NOTE - NSCHARTNOTEFT_GEN_A_CORE
chart reviewed    CAPILLARY BLOOD GLUCOSE    POCT Blood Glucose.: 267 mg/dL (01 Oct 2022 09:31)  POCT Blood Glucose.: 106 mg/dL (01 Oct 2022 07:12)  POCT Blood Glucose.: 183 mg/dL (30 Sep 2022 21:17)  POCT Blood Glucose.: 275 mg/dL (30 Sep 2022 16:52)      10-01    133<L>  |  93<L>  |  6<L>  ----------------------------<  87  4.1   |  33<H>  |  0.28<L>    Ca    9.3      01 Oct 2022 05:22  Phos  2.5     10-01  Mg     1.80     10-01    TPro  6.1  /  Alb  3.0<L>  /  TBili  0.2  /  DBili  x   /  AST  33<H>  /  ALT  36<H>  /  AlkPhos  157<H>  10-01        Reduced Lantus to 5 units sq qhs  Continue admelog 2 units tid ac and low correction scales ac hs    See consult for full poc      Brynn Gottlieb  Nurse Practitioner  Division of Endocrinology & Diabetes  Pager # [phone]      If after 6PM or before 9AM, or on weekends/holidays, please call endocrine answering service for assistance ([phone]).  For nonurgent matters email [email or web address] for assistance.

## 2022-10-01 NOTE — PROGRESS NOTE ADULT - PROBLEM SELECTOR PLAN 1
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 Last settings documented at [facility]:  AVAPS RR 18, EPAP 5, Min IP 15, Max IP 25,   - duonebs q6h  - meropenem + inhaled Colistin per ID recs  - transfer to [facility] for specialized care when bed becomes available  - appreciate pulm and ID recs

## 2022-10-01 NOTE — PHYSICAL THERAPY INITIAL EVALUATION ADULT - PERTINENT HX OF CURRENT PROBLEM, REHAB EVAL
67 year old female presenting with 2-3 weeks of diffuse abdominal pain worst in epigastric region, found to also have bronchiectasis exacerbation possibly 2/2 infection, now on meropenem.

## 2022-10-01 NOTE — PROGRESS NOTE ADULT - PROBLEM SELECTOR PLAN 4
# possibly bilary colic 2/2 choledocholithiasis 2/2 PCD  - f/u MRCP; GI recommending outpatient ERCP    # analgesia  - Patient seemed to be taking omeprazole for minimal abdominal pain relief but might benefit from acetaminophen, possibly avoid opiates to avoid biliary colic, possibly avoid NSAID to avoid mucosal irritation/bleed (hx GERD)  - acetaminophen 500 po q6h standing, Toradol 15 q8 PRN for pain    # weight loss - likely due to poor intake for pain avoidance, though malignancy possible  - diet as per nutrition, multivitamin (has home iron), weekly weights

## 2022-10-01 NOTE — PROGRESS NOTE ADULT - SUBJECTIVE AND OBJECTIVE BOX
PROGRESS NOTE:   Authoted by Dr. Ne Chavez MD    Pager [phone] [facility], [id number] [facility]     Patient is a 67y old  Female who presents with a chief complaint of abdominal pain (30 Sep 2022 15:41)    SUBJECTIVE / OVERNIGHT EVENTS: Clinician spoke to pt in her native language. A/Ox3. No significant overnight events. Pt endorses cough with + yellowish-clear sputum, slightly decreased in amount and frequency since admission. Denies chest pain, palpitations, SOB, dizziness/lightheadedhess, syncope, adominal pain, diarrhea/melena/BRBPR, dysuria/hematuria, focal deficits/weakness/change in sensation.     MEDICATIONS  (STANDING):  acetaminophen     Tablet .. 500 milliGRAM(s) Oral every 6 hours  albuterol/ipratropium for Nebulization 3 milliLiter(s) Nebulizer every 6 hours  amLODIPine   Tablet 5 milliGRAM(s) Oral daily  bisacodyl 5 milliGRAM(s) Oral at bedtime  budesonide 160 MICROgram(s)/formoterol 4.5 MICROgram(s) Inhaler 2 Puff(s) Inhalation two times a day  colistimethate for Nebulization 150 milliGRAM(s) CBA Nebulizer every 12 hours  dextrose 5%. 1000 milliLiter(s) (100 mL/Hr) IV Continuous <Continuous>  dextrose 5%. 1000 milliLiter(s) (50 mL/Hr) IV Continuous <Continuous>  dextrose 50% Injectable 25 Gram(s) IV Push once  dextrose 50% Injectable 12.5 Gram(s) IV Push once  dextrose 50% Injectable 25 Gram(s) IV Push once  enoxaparin Injectable 30 milliGRAM(s) SubCutaneous every 24 hours  ferrous    sulfate 325 milliGRAM(s) Oral daily  fluticasone propionate 50 MICROgram(s)/spray Nasal Spray 1 Spray(s) Both Nostrils two times a day  glucagon  Injectable 1 milliGRAM(s) IntraMuscular once  influenza  Vaccine (HIGH DOSE) 0.7 milliLiter(s) IntraMuscular once  insulin glargine Injectable (LANTUS) 6 Unit(s) SubCutaneous at bedtime  insulin lispro (ADMELOG) corrective regimen sliding scale   SubCutaneous three times a day before meals  insulin lispro (ADMELOG) corrective regimen sliding scale   SubCutaneous at bedtime  insulin lispro Injectable (ADMELOG) 2 Unit(s) SubCutaneous three times a day before meals  lidocaine/prilocaine Cream 1 Application(s) Topical daily  meropenem  IVPB 1000 milliGRAM(s) IV Intermittent every 8 hours  metoprolol succinate ER 50 milliGRAM(s) Oral daily  montelukast 10 milliGRAM(s) Oral daily  multivitamin 1 Tablet(s) Oral daily  nystatin    Suspension 914095 Unit(s) Oral daily  pantoprazole    Tablet 40 milliGRAM(s) Oral before breakfast  polyethylene glycol 3350 17 Gram(s) Oral two times a day  tiotropium 18 MICROgram(s) Capsule 1 Capsule(s) Inhalation daily  venlafaxine XR. 37.5 milliGRAM(s) Oral daily    MEDICATIONS  (PRN):  ALBUTerol    90 MICROgram(s) HFA Inhaler 2 Puff(s) Inhalation every 6 hours PRN Shortness of Breath and/or Wheezing  dextrose Oral Gel 15 Gram(s) Oral once PRN Blood Glucose LESS THAN 70 milliGRAM(s)/deciliter  ketorolac   Injectable 15 milliGRAM(s) IV Push every 8 hours PRN Severe Pain (7 - 10)  simethicone 80 milliGRAM(s) Chew four times a day PRN Gas Pain  sodium chloride 3%  Inhalation 4 milliLiter(s) Inhalation every 8 hours PRN with aerobica/airway clearance      CAPILLARY BLOOD GLUCOSE      POCT Blood Glucose.: 267 mg/dL (01 Oct 2022 09:31)  POCT Blood Glucose.: 106 mg/dL (01 Oct 2022 07:12)  POCT Blood Glucose.: 183 mg/dL (30 Sep 2022 21:17)  POCT Blood Glucose.: 275 mg/dL (30 Sep 2022 16:52)  POCT Blood Glucose.: 211 mg/dL (30 Sep 2022 12:01)    I&O's Summary    30 Sep 2022 07:01  -  01 Oct 2022 07:00  --------------------------------------------------------  IN: 1000 mL / OUT: 350 mL / NET: 650 mL    01 Oct 2022 07:01  -  01 Oct 2022 10:57  --------------------------------------------------------  IN: 0 mL / OUT: 1500 mL / NET: -1500 mL    General: Seated in bed, in no acute distress. Ill-appearing, thin. +yellowish clear mucous.  HEENT: Normocephalic, atraumatic. Extraocular movements grossly intact. No nasal discharge.  Neuro: Alert and oriented x3. No facial asymmetry or dysarthria. Moving all extremities.  CV: No distal edema. WWP.  Respiratory: No increased work of breathing. Conversing in full sentences.  Abdomen: Soft; tender to palpation, all quadrants; nondistended. No rebound or guarding.   : No abdominal tenderness, rebound, or guarding. normoactive BS  Skin: No rashes or bruising of face, forearms, or legs bilaterally.  Psych: Mood and affect appropriate.    PHYSICAL EXAM:  Vital Signs Last 24 Hrs  T(C): 36.6 (01 Oct 2022 06:00), Max: 37.1 (30 Sep 2022 18:08)  T(F): 97.9 (01 Oct 2022 06:00), Max: 98.7 (30 Sep 2022 18:08)  HR: 71 (01 Oct 2022 06:00) (69 - 86)  BP: 139/66 (01 Oct 2022 06:00) (126/47 - 148/59)  RR: 18 (01 Oct 2022 06:00) (18 - 18)  SpO2: 96% (01 Oct 2022 06:00) (95% - 100%)    Parameters below as of 01 Oct 2022 06:00  Patient On (Oxygen Delivery Method): nasal cannula  O2 Flow (L/min): 3        LABS:                        13.0   8.34  )-----------( 171      ( 01 Oct 2022 05:22 )             41.1     10-01    133<L>  |  93<L>  |  6<L>  ----------------------------<  87  4.1   |  33<H>  |  0.28<L>    Ca    9.3      01 Oct 2022 05:22  Phos  2.5     10-01  Mg     1.80     10-01    TPro  6.1  /  Alb  3.0<L>  /  TBili  0.2  /  DBili  x   /  AST  33<H>  /  ALT  36<H>  /  AlkPhos  157<H>  10-01    PT/INR - ( 01 Oct 2022 05:22 )   PT: 11.5 sec;   INR: 0.99 ratio      Culture - Sputum (collected 28 Sep 2022 17:27)  Source: .Sputum Sputum  Gram Stain (28 Sep 2022 21:59):    Moderate polymorphonuclear leukocytes per low power field    No Squamous epithelial cells per low power field    Numerous Gram Negative Rods per oil power field    Rare Gram positive cocci in pairs per oil power field  Final Report (30 Sep 2022 18:39):    Numerous Pseudomonas aeruginosa    Normal Respiratory Denise present  Organism: Pseudomonas aeruginosa (30 Sep 2022 18:39)  Organism: Pseudomonas aeruginosa (30 Sep 2022 18:39)    RADIOLOGY & ADDITIONAL TESTS:  No new imaging or tests    COORDINATION OF CARE:  Care Discussed with Consultants/Other Providers [Y/N]:  Prior or Outpatient Records Reviewed [Y/N]:

## 2022-10-01 NOTE — PROGRESS NOTE ADULT - PROBLEM SELECTOR PLAN 3
# constipation possibly 2/2 PCD  - water enema once  - Miralax, senna, bisacodyl, simethicone po; stool count    # early satiety - possibly 2/2 bloating/constipation, though malignancy possible  - bowel reg as above

## 2022-10-02 LAB
ALBUMIN SERPL ELPH-MCNC: 3.3 G/DL — SIGNIFICANT CHANGE UP (ref 3.3–5)
ALP SERPL-CCNC: 172 U/L — HIGH (ref 40–120)
ALT FLD-CCNC: 30 U/L — SIGNIFICANT CHANGE UP (ref 4–33)
ANION GAP SERPL CALC-SCNC: 7 MMOL/L — SIGNIFICANT CHANGE UP (ref 7–14)
AST SERPL-CCNC: 28 U/L — SIGNIFICANT CHANGE UP (ref 4–32)
BASOPHILS # BLD AUTO: 0.06 K/UL — SIGNIFICANT CHANGE UP (ref 0–0.2)
BASOPHILS NFR BLD AUTO: 0.5 % — SIGNIFICANT CHANGE UP (ref 0–2)
BILIRUB SERPL-MCNC: 0.3 MG/DL — SIGNIFICANT CHANGE UP (ref 0.2–1.2)
BUN SERPL-MCNC: 6 MG/DL — LOW (ref 7–23)
CALCIUM SERPL-MCNC: 9.3 MG/DL — SIGNIFICANT CHANGE UP (ref 8.4–10.5)
CHLORIDE SERPL-SCNC: 93 MMOL/L — LOW (ref 98–107)
CO2 SERPL-SCNC: 33 MMOL/L — HIGH (ref 22–31)
CREAT SERPL-MCNC: 0.27 MG/DL — LOW (ref 0.5–1.3)
EGFR: 121 ML/MIN/1.73M2 — SIGNIFICANT CHANGE UP
EOSINOPHIL # BLD AUTO: 0.17 K/UL — SIGNIFICANT CHANGE UP (ref 0–0.5)
EOSINOPHIL NFR BLD AUTO: 1.5 % — SIGNIFICANT CHANGE UP (ref 0–6)
GLUCOSE BLDC GLUCOMTR-MCNC: 112 MG/DL — HIGH (ref 70–99)
GLUCOSE BLDC GLUCOMTR-MCNC: 204 MG/DL — HIGH (ref 70–99)
GLUCOSE BLDC GLUCOMTR-MCNC: 248 MG/DL — HIGH (ref 70–99)
GLUCOSE BLDC GLUCOMTR-MCNC: 295 MG/DL — HIGH (ref 70–99)
GLUCOSE SERPL-MCNC: 196 MG/DL — HIGH (ref 70–99)
HCT VFR BLD CALC: 40.8 % — SIGNIFICANT CHANGE UP (ref 34.5–45)
HGB BLD-MCNC: 13.1 G/DL — SIGNIFICANT CHANGE UP (ref 11.5–15.5)
IANC: 7.64 K/UL — HIGH (ref 1.8–7.4)
IMM GRANULOCYTES NFR BLD AUTO: 0.8 % — SIGNIFICANT CHANGE UP (ref 0–0.9)
LYMPHOCYTES # BLD AUTO: 2.53 K/UL — SIGNIFICANT CHANGE UP (ref 1–3.3)
LYMPHOCYTES # BLD AUTO: 21.9 % — SIGNIFICANT CHANGE UP (ref 13–44)
MAGNESIUM SERPL-MCNC: 1.8 MG/DL — SIGNIFICANT CHANGE UP (ref 1.6–2.6)
MCHC RBC-ENTMCNC: 27.3 PG — SIGNIFICANT CHANGE UP (ref 27–34)
MCHC RBC-ENTMCNC: 32.1 GM/DL — SIGNIFICANT CHANGE UP (ref 32–36)
MCV RBC AUTO: 85.2 FL — SIGNIFICANT CHANGE UP (ref 80–100)
MONOCYTES # BLD AUTO: 1.04 K/UL — HIGH (ref 0–0.9)
MONOCYTES NFR BLD AUTO: 9 % — SIGNIFICANT CHANGE UP (ref 2–14)
NEUTROPHILS # BLD AUTO: 7.64 K/UL — HIGH (ref 1.8–7.4)
NEUTROPHILS NFR BLD AUTO: 66.3 % — SIGNIFICANT CHANGE UP (ref 43–77)
NRBC # BLD: 0 /100 WBCS — SIGNIFICANT CHANGE UP (ref 0–0)
NRBC # FLD: 0 K/UL — SIGNIFICANT CHANGE UP (ref 0–0)
PHOSPHATE SERPL-MCNC: 2.1 MG/DL — LOW (ref 2.5–4.5)
PLATELET # BLD AUTO: 168 K/UL — SIGNIFICANT CHANGE UP (ref 150–400)
POTASSIUM SERPL-MCNC: 4.3 MMOL/L — SIGNIFICANT CHANGE UP (ref 3.5–5.3)
POTASSIUM SERPL-SCNC: 4.3 MMOL/L — SIGNIFICANT CHANGE UP (ref 3.5–5.3)
PROT SERPL-MCNC: 6.3 G/DL — SIGNIFICANT CHANGE UP (ref 6–8.3)
RBC # BLD: 4.79 M/UL — SIGNIFICANT CHANGE UP (ref 3.8–5.2)
RBC # FLD: 13.1 % — SIGNIFICANT CHANGE UP (ref 10.3–14.5)
SODIUM SERPL-SCNC: 133 MMOL/L — LOW (ref 135–145)
WBC # BLD: 11.53 K/UL — HIGH (ref 3.8–10.5)
WBC # FLD AUTO: 11.53 K/UL — HIGH (ref 3.8–10.5)

## 2022-10-02 PROCEDURE — 99233 SBSQ HOSP IP/OBS HIGH 50: CPT

## 2022-10-02 PROCEDURE — 74181 MRI ABDOMEN W/O CONTRAST: CPT | Mod: 26

## 2022-10-02 RX ORDER — SODIUM,POTASSIUM PHOSPHATES 278-250MG
1 POWDER IN PACKET (EA) ORAL
Refills: 0 | Status: COMPLETED | OUTPATIENT
Start: 2022-10-02 | End: 2022-10-03

## 2022-10-02 RX ORDER — MAGNESIUM OXIDE 400 MG ORAL TABLET 241.3 MG
400 TABLET ORAL
Refills: 0 | Status: COMPLETED | OUTPATIENT
Start: 2022-10-02 | End: 2022-10-03

## 2022-10-02 RX ORDER — INSULIN LISPRO 100/ML
3 VIAL (ML) SUBCUTANEOUS
Refills: 0 | Status: DISCONTINUED | OUTPATIENT
Start: 2022-10-02 | End: 2022-10-03

## 2022-10-02 RX ADMIN — Medication 2 UNIT(S): at 08:25

## 2022-10-02 RX ADMIN — POLYETHYLENE GLYCOL 3350 17 GRAM(S): 17 POWDER, FOR SOLUTION ORAL at 07:13

## 2022-10-02 RX ADMIN — PANTOPRAZOLE SODIUM 40 MILLIGRAM(S): 20 TABLET, DELAYED RELEASE ORAL at 07:13

## 2022-10-02 RX ADMIN — BUDESONIDE AND FORMOTEROL FUMARATE DIHYDRATE 2 PUFF(S): 160; 4.5 AEROSOL RESPIRATORY (INHALATION) at 22:00

## 2022-10-02 RX ADMIN — Medication 1 SPRAY(S): at 17:38

## 2022-10-02 RX ADMIN — Medication 1 TABLET(S): at 17:45

## 2022-10-02 RX ADMIN — Medication 3 MILLILITER(S): at 09:02

## 2022-10-02 RX ADMIN — Medication 3 UNIT(S): at 12:26

## 2022-10-02 RX ADMIN — TIOTROPIUM BROMIDE 1 CAPSULE(S): 18 CAPSULE ORAL; RESPIRATORY (INHALATION) at 10:04

## 2022-10-02 RX ADMIN — ENOXAPARIN SODIUM 30 MILLIGRAM(S): 100 INJECTION SUBCUTANEOUS at 13:44

## 2022-10-02 RX ADMIN — MEROPENEM 100 MILLIGRAM(S): 1 INJECTION INTRAVENOUS at 21:58

## 2022-10-02 RX ADMIN — BUDESONIDE AND FORMOTEROL FUMARATE DIHYDRATE 2 PUFF(S): 160; 4.5 AEROSOL RESPIRATORY (INHALATION) at 10:03

## 2022-10-02 RX ADMIN — Medication 500 MILLIGRAM(S): at 23:13

## 2022-10-02 RX ADMIN — MEROPENEM 100 MILLIGRAM(S): 1 INJECTION INTRAVENOUS at 07:08

## 2022-10-02 RX ADMIN — Medication 3 MILLILITER(S): at 15:21

## 2022-10-02 RX ADMIN — Medication 500000 UNIT(S): at 12:27

## 2022-10-02 RX ADMIN — COLISTIMETHATE SODIUM 150 MILLIGRAM(S) CBA: 150 INJECTION INTRAMUSCULAR; INTRAVENOUS at 23:01

## 2022-10-02 RX ADMIN — Medication 500 MILLIGRAM(S): at 08:00

## 2022-10-02 RX ADMIN — Medication 1 TABLET(S): at 12:26

## 2022-10-02 RX ADMIN — MONTELUKAST 10 MILLIGRAM(S): 4 TABLET, CHEWABLE ORAL at 12:28

## 2022-10-02 RX ADMIN — Medication 1 SPRAY(S): at 07:14

## 2022-10-02 RX ADMIN — INSULIN GLARGINE 5 UNIT(S): 100 INJECTION, SOLUTION SUBCUTANEOUS at 23:10

## 2022-10-02 RX ADMIN — Medication 3 UNIT(S): at 17:36

## 2022-10-02 RX ADMIN — Medication 50 MILLIGRAM(S): at 07:13

## 2022-10-02 RX ADMIN — MAGNESIUM OXIDE 400 MG ORAL TABLET 400 MILLIGRAM(S): 241.3 TABLET ORAL at 17:45

## 2022-10-02 RX ADMIN — Medication 500 MILLIGRAM(S): at 07:13

## 2022-10-02 RX ADMIN — Medication 500 MILLIGRAM(S): at 12:26

## 2022-10-02 RX ADMIN — Medication 37.5 MILLIGRAM(S): at 12:28

## 2022-10-02 RX ADMIN — Medication 2: at 07:35

## 2022-10-02 RX ADMIN — Medication 2: at 17:35

## 2022-10-02 RX ADMIN — LIDOCAINE AND PRILOCAINE CREAM 1 APPLICATION(S): 25; 25 CREAM TOPICAL at 12:29

## 2022-10-02 RX ADMIN — AMLODIPINE BESYLATE 5 MILLIGRAM(S): 2.5 TABLET ORAL at 07:13

## 2022-10-02 RX ADMIN — Medication 1 TABLET(S): at 12:56

## 2022-10-02 RX ADMIN — MEROPENEM 100 MILLIGRAM(S): 1 INJECTION INTRAVENOUS at 13:44

## 2022-10-02 RX ADMIN — Medication 3: at 12:25

## 2022-10-02 RX ADMIN — POLYETHYLENE GLYCOL 3350 17 GRAM(S): 17 POWDER, FOR SOLUTION ORAL at 17:37

## 2022-10-02 RX ADMIN — Medication 3 MILLILITER(S): at 22:57

## 2022-10-02 RX ADMIN — Medication 500 MILLIGRAM(S): at 17:36

## 2022-10-02 RX ADMIN — Medication 3 MILLILITER(S): at 04:52

## 2022-10-02 RX ADMIN — Medication 325 MILLIGRAM(S): at 12:27

## 2022-10-02 RX ADMIN — MAGNESIUM OXIDE 400 MG ORAL TABLET 400 MILLIGRAM(S): 241.3 TABLET ORAL at 12:55

## 2022-10-02 RX ADMIN — Medication 5 MILLIGRAM(S): at 21:59

## 2022-10-02 NOTE — PROGRESS NOTE ADULT - SUBJECTIVE AND OBJECTIVE BOX
Patient is a 67y old  Female who presents with a chief complaint of abdominal pain (01 Oct 2022 10:57)      SUBJECTIVE / OVERNIGHT EVENTS: No acute overnight event. No current abd pain. Ambulated short distance with PT.    MEDICATIONS  (STANDING):  acetaminophen     Tablet .. 500 milliGRAM(s) Oral every 6 hours  albuterol/ipratropium for Nebulization 3 milliLiter(s) Nebulizer every 6 hours  amLODIPine   Tablet 5 milliGRAM(s) Oral daily  bisacodyl 5 milliGRAM(s) Oral at bedtime  budesonide 160 MICROgram(s)/formoterol 4.5 MICROgram(s) Inhaler 2 Puff(s) Inhalation two times a day  colistimethate for Nebulization 150 milliGRAM(s) CBA Nebulizer every 12 hours  dextrose 5%. 1000 milliLiter(s) (100 mL/Hr) IV Continuous <Continuous>  dextrose 5%. 1000 milliLiter(s) (50 mL/Hr) IV Continuous <Continuous>  dextrose 50% Injectable 25 Gram(s) IV Push once  dextrose 50% Injectable 12.5 Gram(s) IV Push once  dextrose 50% Injectable 25 Gram(s) IV Push once  enoxaparin Injectable 30 milliGRAM(s) SubCutaneous every 24 hours  ferrous    sulfate 325 milliGRAM(s) Oral daily  fluticasone propionate 50 MICROgram(s)/spray Nasal Spray 1 Spray(s) Both Nostrils two times a day  glucagon  Injectable 1 milliGRAM(s) IntraMuscular once  influenza  Vaccine (HIGH DOSE) 0.7 milliLiter(s) IntraMuscular once  insulin glargine Injectable (LANTUS) 5 Unit(s) SubCutaneous at bedtime  insulin lispro (ADMELOG) corrective regimen sliding scale   SubCutaneous three times a day before meals  insulin lispro (ADMELOG) corrective regimen sliding scale   SubCutaneous at bedtime  insulin lispro Injectable (ADMELOG) 3 Unit(s) SubCutaneous three times a day before meals  lidocaine/prilocaine Cream 1 Application(s) Topical daily  magnesium oxide 400 milliGRAM(s) Oral three times a day with meals  meropenem  IVPB 1000 milliGRAM(s) IV Intermittent every 8 hours  metoprolol succinate ER 50 milliGRAM(s) Oral daily  montelukast 10 milliGRAM(s) Oral daily  multivitamin 1 Tablet(s) Oral daily  nystatin    Suspension 044705 Unit(s) Oral daily  pantoprazole    Tablet 40 milliGRAM(s) Oral before breakfast  polyethylene glycol 3350 17 Gram(s) Oral two times a day  potassium phosphate / sodium phosphate Tablet (K-PHOS No. 2) 1 Tablet(s) Oral three times a day before meals  tiotropium 18 MICROgram(s) Capsule 1 Capsule(s) Inhalation daily  venlafaxine XR. 37.5 milliGRAM(s) Oral daily    MEDICATIONS  (PRN):  ALBUTerol    90 MICROgram(s) HFA Inhaler 2 Puff(s) Inhalation every 6 hours PRN Shortness of Breath and/or Wheezing  dextrose Oral Gel 15 Gram(s) Oral once PRN Blood Glucose LESS THAN 70 milliGRAM(s)/deciliter  ketorolac   Injectable 15 milliGRAM(s) IV Push every 8 hours PRN Severe Pain (7 - 10)  simethicone 80 milliGRAM(s) Chew four times a day PRN Gas Pain  sodium chloride 3%  Inhalation 4 milliLiter(s) Inhalation every 8 hours PRN with aerobica/airway clearance      CAPILLARY BLOOD GLUCOSE      POCT Blood Glucose.: 292 mg/dL (01 Oct 2022 22:21)  POCT Blood Glucose.: 306 mg/dL (01 Oct 2022 22:20)  POCT Blood Glucose.: 182 mg/dL (01 Oct 2022 17:52)  POCT Blood Glucose.: 177 mg/dL (01 Oct 2022 16:46)  POCT Blood Glucose.: 248 mg/dL (01 Oct 2022 12:37)    I&O's Summary    01 Oct 2022 07:01  -  02 Oct 2022 07:00  --------------------------------------------------------  IN: 240 mL / OUT: 2500 mL / NET: -2260 mL        PHYSICAL EXAM:  Vital Signs Last 24 Hrs  T(C): 37.1 (02 Oct 2022 10:15), Max: 37.1 (02 Oct 2022 10:15)  T(F): 98.7 (02 Oct 2022 10:15), Max: 98.7 (02 Oct 2022 10:15)  HR: 87 (02 Oct 2022 10:15) (77 - 88)  BP: 129/66 (02 Oct 2022 10:15) (115/47 - 166/88)  BP(mean): --  RR: 19 (02 Oct 2022 10:15) (16 - 20)  SpO2: 98% (02 Oct 2022 10:15) (94% - 100%)    Parameters below as of 02 Oct 2022 10:15  Patient On (Oxygen Delivery Method): nasal cannula      CONSTITUTIONAL: NAD, well-developed, well-groomed  EYES: PERRLA; conjunctiva and sclera clear  ENMT: Moist oral mucosa, no pharyngeal injection or exudates; normal dentition  NECK: Supple, no palpable masses; no thyromegaly  RESPIRATORY: Normal respiratory effort; lungs are clear to auscultation bilaterally  CARDIOVASCULAR: Regular rate and rhythm, normal S1 and S2, no murmur/rub/gallop; No lower extremity edema; Peripheral pulses are 2+ bilaterally  ABDOMEN: Nontender to palpation, normoactive bowel sounds, no rebound/guarding; No hepatosplenomegaly  MUSCULOSKELETAL:  Normal gait; no clubbing or cyanosis of digits; no joint swelling or tenderness to palpation  PSYCH: A+O to person, place, and time; affect appropriate  NEUROLOGY: CN 2-12 are intact and symmetric; no gross sensory deficits   SKIN: No rashes; no palpable lesions    LABS:                        13.1   11.53 )-----------( 168      ( 02 Oct 2022 07:08 )             40.8     10-02    133<L>  |  93<L>  |  6<L>  ----------------------------<  196<H>  4.3   |  33<H>  |  0.27<L>    Ca    9.3      02 Oct 2022 07:08  Phos  2.1     10-02  Mg     1.80     10-02    TPro  6.3  /  Alb  3.3  /  TBili  0.3  /  DBili  x   /  AST  28  /  ALT  30  /  AlkPhos  172<H>  10-02    PT/INR - ( 01 Oct 2022 05:22 )   PT: 11.5 sec;   INR: 0.99 ratio                     RADIOLOGY & ADDITIONAL TESTS:  Results Reviewed:   Imaging Personally Reviewed:  Electrocardiogram Personally Reviewed:    COORDINATION OF CARE:  Care Discussed with Consultants/Other Providers [Y/N]:  Prior or Outpatient Records Reviewed [Y/N]:

## 2022-10-02 NOTE — CHART NOTE - NSCHARTNOTEFT_GEN_A_CORE
chart reviewed    CAPILLARY BLOOD GLUCOSE    POCT Blood Glucose.: 292 mg/dL (01 Oct 2022 22:21)  POCT Blood Glucose.: 306 mg/dL (01 Oct 2022 22:20)  POCT Blood Glucose.: 182 mg/dL (01 Oct 2022 17:52)  POCT Blood Glucose.: 177 mg/dL (01 Oct 2022 16:46)  POCT Blood Glucose.: 248 mg/dL (01 Oct 2022 12:37)  POCT Blood Glucose.: 267 mg/dL (01 Oct 2022 09:31)  POCT Blood Glucose.: 106 mg/dL (01 Oct 2022 07:12)  POCT Blood Glucose.: 183 mg/dL (30 Sep 2022 21:17)  POCT Blood Glucose.: 275 mg/dL (30 Sep 2022 16:52)      10-02    133<L>  |  93<L>  |  6<L>  ----------------------------<  196<H>  4.3   |  33<H>  |  0.27<L>    Ca    9.3      02 Oct 2022 07:08  Phos  2.1     10-02  Mg     1.80     10-02    TPro  6.3  /  Alb  3.3  /  TBili  0.3  /  DBili  x   /  AST  28  /  ALT  30  /  AlkPhos  172<H>  10-02        Continue Lantus to 5 units sq qhs  Increase admelog to 3 units tid ac and low correction scales ac hs    See consult for full poc      Brynn Gottlieb  Nurse Practitioner  Division of Endocrinology & Diabetes  Pager # [phone]      If after 6PM or before 9AM, or on weekends/holidays, please call endocrine answering service for assistance ([phone]).  For nonurgent matters email [email or web address] for assistance.

## 2022-10-02 NOTE — PROGRESS NOTE ADULT - PROBLEM SELECTOR PLAN 1
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 Last settings documented at [facility]:  AVAPS RR 18, EPAP 5, Min IP 15, Max IP 25,   - duonebs q6h  - meropenem + inhaled Colistin per ID recs  - transfer to [facility] for specialized care when bed becomes available  - appreciate pulm and ID recs

## 2022-10-03 LAB
ALBUMIN SERPL ELPH-MCNC: 3.1 G/DL — LOW (ref 3.3–5)
ALP SERPL-CCNC: 199 U/L — HIGH (ref 40–120)
ALT FLD-CCNC: 34 U/L — HIGH (ref 4–33)
ANION GAP SERPL CALC-SCNC: 7 MMOL/L — SIGNIFICANT CHANGE UP (ref 7–14)
AST SERPL-CCNC: 31 U/L — SIGNIFICANT CHANGE UP (ref 4–32)
BASOPHILS # BLD AUTO: 0.06 K/UL — SIGNIFICANT CHANGE UP (ref 0–0.2)
BASOPHILS NFR BLD AUTO: 0.6 % — SIGNIFICANT CHANGE UP (ref 0–2)
BILIRUB SERPL-MCNC: 0.2 MG/DL — SIGNIFICANT CHANGE UP (ref 0.2–1.2)
BUN SERPL-MCNC: 6 MG/DL — LOW (ref 7–23)
CALCIUM SERPL-MCNC: 9.5 MG/DL — SIGNIFICANT CHANGE UP (ref 8.4–10.5)
CHLORIDE SERPL-SCNC: 94 MMOL/L — LOW (ref 98–107)
CO2 SERPL-SCNC: 34 MMOL/L — HIGH (ref 22–31)
CREAT SERPL-MCNC: 0.28 MG/DL — LOW (ref 0.5–1.3)
EGFR: 120 ML/MIN/1.73M2 — SIGNIFICANT CHANGE UP
EOSINOPHIL # BLD AUTO: 0.22 K/UL — SIGNIFICANT CHANGE UP (ref 0–0.5)
EOSINOPHIL NFR BLD AUTO: 2.4 % — SIGNIFICANT CHANGE UP (ref 0–6)
GLUCOSE BLDC GLUCOMTR-MCNC: 200 MG/DL — HIGH (ref 70–99)
GLUCOSE BLDC GLUCOMTR-MCNC: 222 MG/DL — HIGH (ref 70–99)
GLUCOSE BLDC GLUCOMTR-MCNC: 224 MG/DL — HIGH (ref 70–99)
GLUCOSE BLDC GLUCOMTR-MCNC: 227 MG/DL — HIGH (ref 70–99)
GLUCOSE SERPL-MCNC: 213 MG/DL — HIGH (ref 70–99)
HCT VFR BLD CALC: 40.3 % — SIGNIFICANT CHANGE UP (ref 34.5–45)
HGB BLD-MCNC: 13.1 G/DL — SIGNIFICANT CHANGE UP (ref 11.5–15.5)
IANC: 6.08 K/UL — SIGNIFICANT CHANGE UP (ref 1.8–7.4)
IMM GRANULOCYTES NFR BLD AUTO: 1.1 % — HIGH (ref 0–0.9)
LYMPHOCYTES # BLD AUTO: 2.04 K/UL — SIGNIFICANT CHANGE UP (ref 1–3.3)
LYMPHOCYTES # BLD AUTO: 21.8 % — SIGNIFICANT CHANGE UP (ref 13–44)
MAGNESIUM SERPL-MCNC: 2 MG/DL — SIGNIFICANT CHANGE UP (ref 1.6–2.6)
MCHC RBC-ENTMCNC: 27.6 PG — SIGNIFICANT CHANGE UP (ref 27–34)
MCHC RBC-ENTMCNC: 32.5 GM/DL — SIGNIFICANT CHANGE UP (ref 32–36)
MCV RBC AUTO: 85 FL — SIGNIFICANT CHANGE UP (ref 80–100)
MONOCYTES # BLD AUTO: 0.85 K/UL — SIGNIFICANT CHANGE UP (ref 0–0.9)
MONOCYTES NFR BLD AUTO: 9.1 % — SIGNIFICANT CHANGE UP (ref 2–14)
NEUTROPHILS # BLD AUTO: 6.08 K/UL — SIGNIFICANT CHANGE UP (ref 1.8–7.4)
NEUTROPHILS NFR BLD AUTO: 65 % — SIGNIFICANT CHANGE UP (ref 43–77)
NRBC # BLD: 0 /100 WBCS — SIGNIFICANT CHANGE UP (ref 0–0)
NRBC # FLD: 0 K/UL — SIGNIFICANT CHANGE UP (ref 0–0)
PHOSPHATE SERPL-MCNC: 3 MG/DL — SIGNIFICANT CHANGE UP (ref 2.5–4.5)
PLATELET # BLD AUTO: 199 K/UL — SIGNIFICANT CHANGE UP (ref 150–400)
POTASSIUM SERPL-MCNC: 4.5 MMOL/L — SIGNIFICANT CHANGE UP (ref 3.5–5.3)
POTASSIUM SERPL-SCNC: 4.5 MMOL/L — SIGNIFICANT CHANGE UP (ref 3.5–5.3)
PROT SERPL-MCNC: 6.5 G/DL — SIGNIFICANT CHANGE UP (ref 6–8.3)
RBC # BLD: 4.74 M/UL — SIGNIFICANT CHANGE UP (ref 3.8–5.2)
RBC # FLD: 13.2 % — SIGNIFICANT CHANGE UP (ref 10.3–14.5)
SODIUM SERPL-SCNC: 135 MMOL/L — SIGNIFICANT CHANGE UP (ref 135–145)
WBC # BLD: 9.35 K/UL — SIGNIFICANT CHANGE UP (ref 3.8–10.5)
WBC # FLD AUTO: 9.35 K/UL — SIGNIFICANT CHANGE UP (ref 3.8–10.5)

## 2022-10-03 PROCEDURE — 99233 SBSQ HOSP IP/OBS HIGH 50: CPT | Mod: GC

## 2022-10-03 PROCEDURE — 99233 SBSQ HOSP IP/OBS HIGH 50: CPT

## 2022-10-03 PROCEDURE — 99232 SBSQ HOSP IP/OBS MODERATE 35: CPT

## 2022-10-03 RX ORDER — INSULIN GLARGINE 100 [IU]/ML
6 INJECTION, SOLUTION SUBCUTANEOUS AT BEDTIME
Refills: 0 | Status: DISCONTINUED | OUTPATIENT
Start: 2022-10-03 | End: 2022-10-04

## 2022-10-03 RX ORDER — SIMETHICONE 80 MG/1
80 TABLET, CHEWABLE ORAL
Refills: 0 | Status: DISCONTINUED | OUTPATIENT
Start: 2022-10-03 | End: 2022-10-10

## 2022-10-03 RX ORDER — PIPERACILLIN AND TAZOBACTAM 4; .5 G/20ML; G/20ML
3.38 INJECTION, POWDER, LYOPHILIZED, FOR SOLUTION INTRAVENOUS EVERY 8 HOURS
Refills: 0 | Status: DISCONTINUED | OUTPATIENT
Start: 2022-10-03 | End: 2022-10-08

## 2022-10-03 RX ORDER — INSULIN LISPRO 100/ML
4 VIAL (ML) SUBCUTANEOUS
Refills: 0 | Status: DISCONTINUED | OUTPATIENT
Start: 2022-10-03 | End: 2022-10-04

## 2022-10-03 RX ADMIN — COLISTIMETHATE SODIUM 150 MILLIGRAM(S) CBA: 150 INJECTION INTRAMUSCULAR; INTRAVENOUS at 22:42

## 2022-10-03 RX ADMIN — MEROPENEM 100 MILLIGRAM(S): 1 INJECTION INTRAVENOUS at 06:41

## 2022-10-03 RX ADMIN — SIMETHICONE 80 MILLIGRAM(S): 80 TABLET, CHEWABLE ORAL at 17:35

## 2022-10-03 RX ADMIN — Medication 500 MILLIGRAM(S): at 07:30

## 2022-10-03 RX ADMIN — Medication 500 MILLIGRAM(S): at 00:00

## 2022-10-03 RX ADMIN — MEROPENEM 100 MILLIGRAM(S): 1 INJECTION INTRAVENOUS at 15:26

## 2022-10-03 RX ADMIN — Medication 3 UNIT(S): at 12:23

## 2022-10-03 RX ADMIN — MONTELUKAST 10 MILLIGRAM(S): 4 TABLET, CHEWABLE ORAL at 12:22

## 2022-10-03 RX ADMIN — Medication 2: at 12:20

## 2022-10-03 RX ADMIN — Medication 500 MILLIGRAM(S): at 17:36

## 2022-10-03 RX ADMIN — Medication 1: at 08:00

## 2022-10-03 RX ADMIN — Medication 325 MILLIGRAM(S): at 12:22

## 2022-10-03 RX ADMIN — BUDESONIDE AND FORMOTEROL FUMARATE DIHYDRATE 2 PUFF(S): 160; 4.5 AEROSOL RESPIRATORY (INHALATION) at 22:10

## 2022-10-03 RX ADMIN — ENOXAPARIN SODIUM 30 MILLIGRAM(S): 100 INJECTION SUBCUTANEOUS at 15:27

## 2022-10-03 RX ADMIN — POLYETHYLENE GLYCOL 3350 17 GRAM(S): 17 POWDER, FOR SOLUTION ORAL at 06:41

## 2022-10-03 RX ADMIN — MAGNESIUM OXIDE 400 MG ORAL TABLET 400 MILLIGRAM(S): 241.3 TABLET ORAL at 08:00

## 2022-10-03 RX ADMIN — Medication 500 MILLIGRAM(S): at 23:27

## 2022-10-03 RX ADMIN — Medication 1 TABLET(S): at 12:22

## 2022-10-03 RX ADMIN — COLISTIMETHATE SODIUM 150 MILLIGRAM(S) CBA: 150 INJECTION INTRAMUSCULAR; INTRAVENOUS at 11:25

## 2022-10-03 RX ADMIN — Medication 5 MILLIGRAM(S): at 22:11

## 2022-10-03 RX ADMIN — Medication 1 SPRAY(S): at 17:37

## 2022-10-03 RX ADMIN — AMLODIPINE BESYLATE 5 MILLIGRAM(S): 2.5 TABLET ORAL at 06:40

## 2022-10-03 RX ADMIN — Medication 37.5 MILLIGRAM(S): at 12:24

## 2022-10-03 RX ADMIN — Medication 1 TABLET(S): at 06:46

## 2022-10-03 RX ADMIN — Medication 20 MILLIGRAM(S): at 17:35

## 2022-10-03 RX ADMIN — Medication 3 UNIT(S): at 07:59

## 2022-10-03 RX ADMIN — TIOTROPIUM BROMIDE 1 CAPSULE(S): 18 CAPSULE ORAL; RESPIRATORY (INHALATION) at 11:46

## 2022-10-03 RX ADMIN — PANTOPRAZOLE SODIUM 40 MILLIGRAM(S): 20 TABLET, DELAYED RELEASE ORAL at 06:40

## 2022-10-03 RX ADMIN — Medication 500 MILLIGRAM(S): at 12:22

## 2022-10-03 RX ADMIN — Medication 500000 UNIT(S): at 12:23

## 2022-10-03 RX ADMIN — LIDOCAINE AND PRILOCAINE CREAM 1 APPLICATION(S): 25; 25 CREAM TOPICAL at 12:26

## 2022-10-03 RX ADMIN — Medication 3 MILLILITER(S): at 17:00

## 2022-10-03 RX ADMIN — PIPERACILLIN AND TAZOBACTAM 25 GRAM(S): 4; .5 INJECTION, POWDER, LYOPHILIZED, FOR SOLUTION INTRAVENOUS at 22:09

## 2022-10-03 RX ADMIN — Medication 2: at 17:30

## 2022-10-03 RX ADMIN — Medication 50 MILLIGRAM(S): at 06:40

## 2022-10-03 RX ADMIN — Medication 3 MILLILITER(S): at 11:08

## 2022-10-03 RX ADMIN — Medication 500 MILLIGRAM(S): at 06:40

## 2022-10-03 RX ADMIN — Medication 4 UNIT(S): at 17:32

## 2022-10-03 RX ADMIN — INSULIN GLARGINE 6 UNIT(S): 100 INJECTION, SOLUTION SUBCUTANEOUS at 22:08

## 2022-10-03 RX ADMIN — Medication 3 MILLILITER(S): at 22:43

## 2022-10-03 RX ADMIN — BUDESONIDE AND FORMOTEROL FUMARATE DIHYDRATE 2 PUFF(S): 160; 4.5 AEROSOL RESPIRATORY (INHALATION) at 11:46

## 2022-10-03 RX ADMIN — Medication 1 SPRAY(S): at 06:40

## 2022-10-03 RX ADMIN — Medication 3 MILLILITER(S): at 05:53

## 2022-10-03 NOTE — PROGRESS NOTE ADULT - PROBLEM SELECTOR PLAN 1
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 Last settings documented at [facility]:  AVAPS RR 18, EPAP 5, Min IP 15, Max IP 25,   - duonebs q6h  - meropenem + inhaled Colistin per ID recs  - transfer to [facility] for specialized care when bed becomes available  - appreciate pulm and ID recs

## 2022-10-03 NOTE — PHARMACOTHERAPY INTERVENTION NOTE - COMMENTS
Patient and  preferred daughter Mio translate (they called her from their phone). Reviewed A1c, basal/bolus insulin pen administration, hypoglycemia and treatment, goal BG, and when to check BG; pt's daughter states that the  and daughter-in-law help with the insulin pen administration. Pt's  performed good return demonstration of insulin pen. Daughter states they properly store insulin (informed them they can leave pen in use at room temp), rotate site, inject in the abdomen, and proper sharps disposal. Pt encouraged for outpt f/u with medicine and endocrine.  Patient and  preferred daughter Mio translate (they called her from their phone). Reviewed A1c, basal/bolus insulin pen administration, hypoglycemia and treatment, goal BG, and when to check BG; pt's daughter states that the  and daughter-in-law help with the insulin pen administration. Pt's  performed good return demonstration of insulin pen. Daughter states they properly store insulin (informed them they can leave pen in use at room temp), rotate site, inject in the abdomen, and proper sharps disposal. Encouraged close outpatient follow-up with medicine and endocrine (family states they have endo but unsure of name) -  Family states they normally do their own adjustments when steroids are started - reiterated importance of having a medical professional to contact with set dose increases during steroids. (Last admission 1/2022 - we set up patient with endocrine at [facility] but no showed to appt).

## 2022-10-03 NOTE — PROGRESS NOTE ADULT - ATTENDING COMMENTS
67 year old female with primary ciliary dyskinesia, bronchiectasis, and chronic hypoxemic and hypercapnic respiratory on AVAPS who presents with abdominal pain and increased sputum production. Found to have choledocholithiasis which is unchanged with low suspicion for obstruction. GI recommending non-urgent ERCP. Being treated for acute exacerbation of her bronchiectasis.     Agree with empiric antibiotics. ID input appreciated. Meropenem, on inhaled colistin  Follow up sputum culture results  Bronchodilator and home airway clearance regimen  Supplemental O2 during daytime to target SpO2 > 92  AVAPS qHS 67 year old female with primary ciliary dyskinesia, bronchiectasis, and chronic hypoxemic and hypercapnic respiratory on AVAPS who presents with abdominal pain and increased sputum production on treatment for acute exacerbation of her bronchiectasis.     Continue Meropenem and inhaled colistin.  Sputum culture growing Pseudomonas.  Bronchodilator and home airway clearance regimen.  Add prednisone 20mg qdaily.  Supplemental O2 during daytime to target SpO2 > 92.  AVAPS qHS.

## 2022-10-03 NOTE — PROGRESS NOTE ADULT - PROBLEM SELECTOR PLAN 4
# possibly bilary colic 2/2 choledocholithiasis 2/2 PCD  - f/u MRCP, no stone in CBD, but a stone in the distal preampullary; Will contact GI re: MRCP results.    # analgesia  - Patient seemed to be taking omeprazole for minimal abdominal pain relief but might benefit from acetaminophen, possibly avoid opiates to avoid biliary colic, possibly avoid NSAID to avoid mucosal irritation/bleed (hx GERD)  - acetaminophen 500 po q6h standing, Toradol 15 q8 PRN for pain    # weight loss - likely due to poor intake for pain avoidance, though malignancy possible  - diet as per nutrition, multivitamin (has home iron), weekly weights

## 2022-10-03 NOTE — PROGRESS NOTE ADULT - ASSESSMENT
67F w/ Primary Ciliary Dyskinesia, Chronic hyoxemic/hypercapneic Respiratory Failure on 2-3L Home O2 NC secondary to Bronchiectasis on AVAPS, Cirrhosis, Prior CBD stricture s/p dilation, stent placement and removal, HCV Ab+, presented to the ED for abdominal pain found to have punctate CBD stones. Pulmonary consulted for co-management of bronchiectasis. RVP negative. Pt follows with Dr. Young as outpt.     #Bronchiectasis Exacerbation  #Chronic hypoxemic and hypercapnic respiratory Failure    Multiple sputum cultures in the past with pseudomonas aeruginosa (sensitive to geoff and zosyn in 8/23/2022), klebsiella and elizabethkingia meningoseptica.  She has tried both inhaled amikacin and tobramycin in the past and was unable to tolerate it due to nausea and tremulousness.   Outpt workup for cystic fibrosis negative.   Aspergillus igg negative in the past.  sputum culture growing PsA    Recommendations:  hold off on steroids for now  Continue home inhalers - albuterol, spiriva, symbicort  C/w home airway clearance regimen: hypersal, aerobica, chest vest  Chest PT  ID Consult for abx co-management - will likely need systemic abx +/- inhaled (if able to tolerate) : on meropenem and inhaled colisitin  c/w home O2   c/w NIPPV qhs at her home settings. Last settings documented at [facility]:  AVAPS RR 18, EPAP 5, Min IP 15, Max IP 25,   discussed with Dr. Uriostegui and primary team     Jose Chandra MD  [facility] PGY4  [facility] [id number], [facility] [phone]         67F w/ Primary Ciliary Dyskinesia, Chronic hyoxemic/hypercapneic Respiratory Failure on 2-3L Home O2 NC secondary to Bronchiectasis on AVAPS, Cirrhosis, Prior CBD stricture s/p dilation, stent placement and removal, HCV Ab+, presented to the ED for abdominal pain found to have punctate CBD stones. Pulmonary consulted for co-management of bronchiectasis. RVP negative. Pt follows with Dr. Young as outpt.     #Bronchiectasis Exacerbation  #Chronic hypoxemic and hypercapnic respiratory Failure    Multiple sputum cultures in the past with pseudomonas aeruginosa (sensitive to geoff and zosyn in 8/23/2022), klebsiella and elizabethkingia meningoseptica.  She has tried both inhaled amikacin and tobramycin in the past and was unable to tolerate it due to nausea and tremulousness.   Outpt workup for cystic fibrosis negative.   Aspergillus igg negative in the past.  sputum culture growing PsA    Recommendations:  please give 20 mg prednisone starting today  Continue home inhalers - albuterol, spiriva, symbicort  C/w home airway clearance regimen: hypersal, aerobica, chest vest  Chest PT  ID Consult for abx co-management - will likely need systemic abx +/- inhaled (if able to tolerate) : on meropenem and inhaled colisitin  c/w home O2   c/w NIPPV qhs at her home settings. Last settings documented at [facility]:  AVAPS RR 18, EPAP 5, Min IP 15, Max IP 25,   discussed with Dr. Uriostegui and primary team     Jose Chandra MD  [facility] PGY4  [facility] [id number], [facility] [phone]

## 2022-10-03 NOTE — PROGRESS NOTE ADULT - SUBJECTIVE AND OBJECTIVE BOX
Chief Complaint: DM 2    History: Patient seen at bedside with family member present who provided interpretation. Patient tolerating meals, no nausea/vomiting, no hypoglycemia    MEDICATIONS  (STANDING):  acetaminophen     Tablet .. 500 milliGRAM(s) Oral every 6 hours  albuterol/ipratropium for Nebulization 3 milliLiter(s) Nebulizer every 6 hours  amLODIPine   Tablet 5 milliGRAM(s) Oral daily  bisacodyl 5 milliGRAM(s) Oral at bedtime  budesonide 160 MICROgram(s)/formoterol 4.5 MICROgram(s) Inhaler 2 Puff(s) Inhalation two times a day  colistimethate for Nebulization 150 milliGRAM(s) CBA Nebulizer every 12 hours  dextrose 5%. 1000 milliLiter(s) (50 mL/Hr) IV Continuous <Continuous>  dextrose 5%. 1000 milliLiter(s) (100 mL/Hr) IV Continuous <Continuous>  dextrose 50% Injectable 25 Gram(s) IV Push once  dextrose 50% Injectable 12.5 Gram(s) IV Push once  dextrose 50% Injectable 25 Gram(s) IV Push once  enoxaparin Injectable 30 milliGRAM(s) SubCutaneous every 24 hours  ferrous    sulfate 325 milliGRAM(s) Oral daily  fluticasone propionate 50 MICROgram(s)/spray Nasal Spray 1 Spray(s) Both Nostrils two times a day  glucagon  Injectable 1 milliGRAM(s) IntraMuscular once  influenza  Vaccine (HIGH DOSE) 0.7 milliLiter(s) IntraMuscular once  insulin glargine Injectable (LANTUS) 5 Unit(s) SubCutaneous at bedtime  insulin lispro (ADMELOG) corrective regimen sliding scale   SubCutaneous three times a day before meals  insulin lispro (ADMELOG) corrective regimen sliding scale   SubCutaneous at bedtime  insulin lispro Injectable (ADMELOG) 3 Unit(s) SubCutaneous three times a day before meals  lidocaine/prilocaine Cream 1 Application(s) Topical daily  meropenem  IVPB 1000 milliGRAM(s) IV Intermittent every 8 hours  metoprolol succinate ER 50 milliGRAM(s) Oral daily  montelukast 10 milliGRAM(s) Oral daily  multivitamin 1 Tablet(s) Oral daily  nystatin    Suspension 035896 Unit(s) Oral daily  pantoprazole    Tablet 40 milliGRAM(s) Oral before breakfast  polyethylene glycol 3350 17 Gram(s) Oral two times a day  tiotropium 18 MICROgram(s) Capsule 1 Capsule(s) Inhalation daily  venlafaxine XR. 37.5 milliGRAM(s) Oral daily    MEDICATIONS  (PRN):  ALBUTerol    90 MICROgram(s) HFA Inhaler 2 Puff(s) Inhalation every 6 hours PRN Shortness of Breath and/or Wheezing  dextrose Oral Gel 15 Gram(s) Oral once PRN Blood Glucose LESS THAN 70 milliGRAM(s)/deciliter  ketorolac   Injectable 15 milliGRAM(s) IV Push every 8 hours PRN Severe Pain (7 - 10)  simethicone 80 milliGRAM(s) Chew four times a day PRN Gas Pain  sodium chloride 3%  Inhalation 4 milliLiter(s) Inhalation every 8 hours PRN with aerobica/airway clearance    No Known Allergies    Review of Systems:  HEENT: No pain  Cardiovascular: No chest pain  Respiratory: No SOB  GI: No nausea, vomiting    PHYSICAL EXAM:  VITALS: T(C): 37.1 (10-03-22 @ 09:37)  T(F): 98.7 (10-03-22 @ 09:37), Max: 98.7 (10-03-22 @ 09:37)  HR: 84 (10-03-22 @ 09:37) (79 - 104)  BP: 130/79 (10-03-22 @ 09:37) (108/82 - 153/86)  RR:  (17 - 18)  SpO2:  (95% - 100%)  Wt(kg): --  GENERAL: NAD  EYES: No proptosis, no lid lag, anicteric  HEENT:  Atraumatic, Normocephalic, moist mucous membranes  RESPIRATORY: unlabored respirations     CAPILLARY BLOOD GLUCOSE    POCT Blood Glucose.: 224 mg/dL (03 Oct 2022 11:57)  POCT Blood Glucose.: 200 mg/dL (03 Oct 2022 07:36)  POCT Blood Glucose.: 112 mg/dL (02 Oct 2022 22:38)  POCT Blood Glucose.: 248 mg/dL (02 Oct 2022 16:52)      10-03    135  |  94<L>  |  6<L>  ----------------------------<  213<H>  4.5   |  34<H>  |  0.28<L>    eGFR: 120    Ca    9.5      10-03  Mg     2.00     10-03  Phos  3.0     10-03    TPro  6.5  /  Alb  3.1<L>  /  TBili  0.2  /  DBili  x   /  AST  31  /  ALT  34<H>  /  AlkPhos  199<H>  10-03      A1C with Estimated Average Glucose Result: 9.4 % (09-29-22 @ 05:00)  A1C with Estimated Average Glucose Result: 9.4 % (01-21-22 @ 14:26)    Diet, Consistent Carbohydrate/No Snacks:   Gastroesophageal Reflux Disease (GERD)  DASH/TLC Sodium & Cholesterol Restricted (DASH)  No Beef  No Pork  Supplement Feeding Modality:  Oral  Glucerna Shake Cans or Servings Per Day:  2       Frequency:  Daily (09-30-22 @ 08:27) [Active]

## 2022-10-03 NOTE — PROGRESS NOTE ADULT - ASSESSMENT
67 f with Primary Ciliary Dyskinesia, Chronic hyoxemic/hypercapneic Respiratory Failure on 2-3L Home O2 NC secondary to Bronchiectasis on AVAPS, Cirrhosis, Prior CBD stricture s/p dilation, stent placement and removal, HCV Ab+, presented to the ED for abdominal pain  afebrile, no WBC, LFTs unremarkable  chest/abd CT 9/28 : chronic infectious or inflammatory bronchitis and bronchiolitis.  Punctate nonobstructing choledocholithiasis in the distal common duct, new from the prior study.  GI evaluted the pt and recommended outpatient ERCP and MRCP was done 10/2 which showed Hepaticojejunostomy, Pneumobilia but no choledocholithiasis in CBD leading to the hepaticojejunostomy. Previously seen calculi in CT are located in the distal preampullary precluded CBD.  sputum cx with pseudomonas pt on geoff    * can switch to zosyn as the pseudomonas is sensitive to zosyn, antibiotics started 9/28, now day 6  * pt did not tolerate inhaled tobra and amikacin  * c/w inhaled colistin   * abd pain management as per GI  The above assessment and plan was discussed with the primary team    Urmila Rose MD  contact on teams  After 5pm and on weekends call [phone]

## 2022-10-03 NOTE — PROGRESS NOTE ADULT - SUBJECTIVE AND OBJECTIVE BOX
CHIEF COMPLAINT:Patient is a 67y old  Female who presents with a chief complaint of abdominal pain (02 Oct 2022 11:31)      Interval Events: coughing improved, mucus greatly improved, has some mild minimal abdominal gurgling and pain. No fevers, chills, chest pain, leg swelling, wheezing    REVIEW OF SYSTEMS:  [x] All other systems negative except per HPI   [ ] Unable to assess ROS because ________    OBJECTIVE:  ICU Vital Signs Last 24 Hrs  T(C): 37.1 (03 Oct 2022 09:37), Max: 37.1 (03 Oct 2022 09:37)  T(F): 98.7 (03 Oct 2022 09:37), Max: 98.7 (03 Oct 2022 09:37)  HR: 84 (03 Oct 2022 09:37) (79 - 104)  BP: 130/79 (03 Oct 2022 09:37) (108/82 - 153/86)  BP(mean): --  ABP: --  ABP(mean): --  RR: 18 (03 Oct 2022 09:37) (17 - 18)  SpO2: 98% (03 Oct 2022 09:37) (95% - 100%)    O2 Parameters below as of 03 Oct 2022 09:37  Patient On (Oxygen Delivery Method): nasal cannula              10-02 @ 07:01  -  10-03 @ 07:00  --------------------------------------------------------  IN: 820 mL / OUT: 1730 mL / NET: -910 mL        PHYSICAL EXAM:  GENERAL: NAD, thin  HEAD:  Atraumatic, Normocephalic  EYES: EOMI, PERRLA, conjunctiva and sclera clear  ENMT: No tonsillar erythema, exudates, or enlargement; Moist mucous membranes, Good dentition, No lesions  NECK: Supple, No JVD, Normal thyroid  CHEST/LUNG: minimal course rhonci  HEART: Regular rate and rhythm; No murmurs, rubs, or gallops  ABDOMEN: Soft, Nontender, Nondistended; Bowel sounds present  VASCULAR:  2+ Peripheral Pulses, No clubbing, cyanosis, or edema  LYMPH: No lymphadenopathy noted  SKIN: No rashes or lesions  NERVOUS SYSTEM:  Alert & Oriented X3, Good concentration; Motor Strength 5/5 B/L upper and lower extremities; DTRs 2+ intact and symmetric    HOSPITAL MEDICATIONS:  MEDICATIONS  (STANDING):  acetaminophen     Tablet .. 500 milliGRAM(s) Oral every 6 hours  albuterol/ipratropium for Nebulization 3 milliLiter(s) Nebulizer every 6 hours  amLODIPine   Tablet 5 milliGRAM(s) Oral daily  bisacodyl 5 milliGRAM(s) Oral at bedtime  budesonide 160 MICROgram(s)/formoterol 4.5 MICROgram(s) Inhaler 2 Puff(s) Inhalation two times a day  colistimethate for Nebulization 150 milliGRAM(s) CBA Nebulizer every 12 hours  dextrose 5%. 1000 milliLiter(s) (50 mL/Hr) IV Continuous <Continuous>  dextrose 5%. 1000 milliLiter(s) (100 mL/Hr) IV Continuous <Continuous>  dextrose 50% Injectable 25 Gram(s) IV Push once  dextrose 50% Injectable 12.5 Gram(s) IV Push once  dextrose 50% Injectable 25 Gram(s) IV Push once  enoxaparin Injectable 30 milliGRAM(s) SubCutaneous every 24 hours  ferrous    sulfate 325 milliGRAM(s) Oral daily  fluticasone propionate 50 MICROgram(s)/spray Nasal Spray 1 Spray(s) Both Nostrils two times a day  glucagon  Injectable 1 milliGRAM(s) IntraMuscular once  influenza  Vaccine (HIGH DOSE) 0.7 milliLiter(s) IntraMuscular once  insulin glargine Injectable (LANTUS) 5 Unit(s) SubCutaneous at bedtime  insulin lispro (ADMELOG) corrective regimen sliding scale   SubCutaneous three times a day before meals  insulin lispro (ADMELOG) corrective regimen sliding scale   SubCutaneous at bedtime  insulin lispro Injectable (ADMELOG) 3 Unit(s) SubCutaneous three times a day before meals  lidocaine/prilocaine Cream 1 Application(s) Topical daily  magnesium oxide 400 milliGRAM(s) Oral three times a day with meals  meropenem  IVPB 1000 milliGRAM(s) IV Intermittent every 8 hours  metoprolol succinate ER 50 milliGRAM(s) Oral daily  montelukast 10 milliGRAM(s) Oral daily  multivitamin 1 Tablet(s) Oral daily  nystatin    Suspension 490277 Unit(s) Oral daily  pantoprazole    Tablet 40 milliGRAM(s) Oral before breakfast  polyethylene glycol 3350 17 Gram(s) Oral two times a day  tiotropium 18 MICROgram(s) Capsule 1 Capsule(s) Inhalation daily  venlafaxine XR. 37.5 milliGRAM(s) Oral daily    MEDICATIONS  (PRN):  ALBUTerol    90 MICROgram(s) HFA Inhaler 2 Puff(s) Inhalation every 6 hours PRN Shortness of Breath and/or Wheezing  dextrose Oral Gel 15 Gram(s) Oral once PRN Blood Glucose LESS THAN 70 milliGRAM(s)/deciliter  ketorolac   Injectable 15 milliGRAM(s) IV Push every 8 hours PRN Severe Pain (7 - 10)  simethicone 80 milliGRAM(s) Chew four times a day PRN Gas Pain  sodium chloride 3%  Inhalation 4 milliLiter(s) Inhalation every 8 hours PRN with aerobica/airway clearance      LABS:    The Labs were reviewed by me   The Radiology was reviewed by me    EKG tracing reviewed by me    10-03    135  |  94<L>  |  6<L>  ----------------------------<  213<H>  4.5   |  34<H>  |  0.28<L>  10-02    133<L>  |  93<L>  |  6<L>  ----------------------------<  196<H>  4.3   |  33<H>  |  0.27<L>  10-01    133<L>  |  93<L>  |  6<L>  ----------------------------<  87  4.1   |  33<H>  |  0.28<L>    Ca    9.5      03 Oct 2022 07:46  Ca    9.3      02 Oct 2022 07:08  Ca    9.3      01 Oct 2022 05:22  Phos  3.0     10-03  Mg     2.00     10-03    TPro  6.5  /  Alb  3.1<L>  /  TBili  0.2  /  DBili  x   /  AST  31  /  ALT  34<H>  /  AlkPhos  199<H>  10-03  TPro  6.3  /  Alb  3.3  /  TBili  0.3  /  DBili  x   /  AST  28  /  ALT  30  /  AlkPhos  172<H>  10-02  TPro  6.1  /  Alb  3.0<L>  /  TBili  0.2  /  DBili  x   /  AST  33<H>  /  ALT  36<H>  /  AlkPhos  157<H>  10-01    Magnesium, Serum: 2.00 mg/dL (10-03-22 @ 07:46)  Magnesium, Serum: 1.80 mg/dL (10-02-22 @ 07:08)  Magnesium, Serum: 1.80 mg/dL (10-01-22 @ 05:22)    Phosphorus Level, Serum: 3.0 mg/dL (10-03-22 @ 07:46)  Phosphorus Level, Serum: 2.1 mg/dL (10-02-22 @ 07:08)  Phosphorus Level, Serum: 2.5 mg/dL (10-01-22 @ 05:22)                                              13.1   9.35  )-----------( 199      ( 03 Oct 2022 07:46 )             40.3                         13.1   11.53 )-----------( 168      ( 02 Oct 2022 07:08 )             40.8                         13.0   8.34  )-----------( 171      ( 01 Oct 2022 05:22 )             41.1     CAPILLARY BLOOD GLUCOSE      POCT Blood Glucose.: 200 mg/dL (03 Oct 2022 07:36)  POCT Blood Glucose.: 112 mg/dL (02 Oct 2022 22:38)  POCT Blood Glucose.: 248 mg/dL (02 Oct 2022 16:52)  POCT Blood Glucose.: 295 mg/dL (02 Oct 2022 12:06)        MICROBIOLOGY:     RADIOLOGY:  [ ] Reviewed and interpreted by me    Point of Care Ultrasound Findings:    PFT:    EKG:

## 2022-10-03 NOTE — CHART NOTE - NSCHARTNOTEFT_GEN_A_CORE
Discussed MRCP results with gastroenterology team:    Pneumobilia ; no intrahepatic or extrahepatic biliary dilatation.  Hepaticojejunostomy. Pneumobilia but no choledocholithiasis in the common   hepatic duct leading to the hepaticojejunostomy.  Previously seen calculi in CT are located in the distal preampullary   precluded CBD.    Per Gastroenterology team no further inpatient intervention for above results, outpatient GI follow up  Dr. Guerra notified.

## 2022-10-03 NOTE — PROGRESS NOTE ADULT - ASSESSMENT
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  Endocrinology consulted for assistance with management of type 2 diabetes.    1. Type 2 Diabetes Mellitus, poorly controlled  HbA1c: 9.4%  Home Regimen: Lantus 15u HS, Lispro 5u AC  Suspect that her BG fluctuate at home with varying doses of steroids    While inpatient:  BG target 100-180 mg/dl  Increase Lantus slightly to 6 units SQ qHS  Increase Admelog to 4 units SQ TID before meals (Hold if NPO/skips meal)   Continue Admelog LOW dose correctional scale before meals, low dose at bedtime  Check BG before meals and bedtime  Consistent carbohydrate diet  Hypoglycemia protocol   Please let us know if she will be started on steroids    Discharge Plan:  Basal/bolus insulin, doses to be determined.   Can consider using correctional scale to assist with better glucose control at home (per daughter, they are estimating, but no structured correctional scale)  Ensure patient has glucometer, glucose test strips, lancets, alcohol swabs and insulin PEN needles  Followup with [facility] [place] Endocrine Clinic: [phone] [facility] [[email or web address]] [id number], [phone]    2. Hypertension  BP goal <130/80  Close to target on Metoprolol and Amlodipine   Further management as per primary team    3. Hyperlipidemia  LDL target less than 70  LDL resulted 60, not on statin   Ok to defer statin for now but would reconsider as outpatient. Patient is in statin benefit group, has type 2 DM and age 40-75    Sabrina Plaza  Nurse Practitioner  Division of Endocrinology & Diabetes  In house pager #[phone]/long range pager #[phone]    If before 9AM or after 6PM, or on weekends/holidays, please call endocrine answering service for assistance ([phone]).  For nonurgent matters email [email or web address] for assistance.

## 2022-10-03 NOTE — PROGRESS NOTE ADULT - SUBJECTIVE AND OBJECTIVE BOX
Patient is a 67y old  Female who presents with a chief complaint of abdominal pain (03 Oct 2022 10:56)      SUBJECTIVE / OVERNIGHT EVENTS: Pt seen and examined, chart reviewed. Still c/o thick mucus.     MEDICATIONS  (STANDING):  acetaminophen     Tablet .. 500 milliGRAM(s) Oral every 6 hours  albuterol/ipratropium for Nebulization 3 milliLiter(s) Nebulizer every 6 hours  amLODIPine   Tablet 5 milliGRAM(s) Oral daily  bisacodyl 5 milliGRAM(s) Oral at bedtime  budesonide 160 MICROgram(s)/formoterol 4.5 MICROgram(s) Inhaler 2 Puff(s) Inhalation two times a day  colistimethate for Nebulization 150 milliGRAM(s) CBA Nebulizer every 12 hours  dextrose 5%. 1000 milliLiter(s) (50 mL/Hr) IV Continuous <Continuous>  dextrose 5%. 1000 milliLiter(s) (100 mL/Hr) IV Continuous <Continuous>  dextrose 50% Injectable 25 Gram(s) IV Push once  dextrose 50% Injectable 12.5 Gram(s) IV Push once  dextrose 50% Injectable 25 Gram(s) IV Push once  enoxaparin Injectable 30 milliGRAM(s) SubCutaneous every 24 hours  ferrous    sulfate 325 milliGRAM(s) Oral daily  fluticasone propionate 50 MICROgram(s)/spray Nasal Spray 1 Spray(s) Both Nostrils two times a day  glucagon  Injectable 1 milliGRAM(s) IntraMuscular once  influenza  Vaccine (HIGH DOSE) 0.7 milliLiter(s) IntraMuscular once  insulin glargine Injectable (LANTUS) 5 Unit(s) SubCutaneous at bedtime  insulin lispro (ADMELOG) corrective regimen sliding scale   SubCutaneous three times a day before meals  insulin lispro (ADMELOG) corrective regimen sliding scale   SubCutaneous at bedtime  insulin lispro Injectable (ADMELOG) 3 Unit(s) SubCutaneous three times a day before meals  lidocaine/prilocaine Cream 1 Application(s) Topical daily  meropenem  IVPB 1000 milliGRAM(s) IV Intermittent every 8 hours  metoprolol succinate ER 50 milliGRAM(s) Oral daily  montelukast 10 milliGRAM(s) Oral daily  multivitamin 1 Tablet(s) Oral daily  nystatin    Suspension 873330 Unit(s) Oral daily  pantoprazole    Tablet 40 milliGRAM(s) Oral before breakfast  polyethylene glycol 3350 17 Gram(s) Oral two times a day  tiotropium 18 MICROgram(s) Capsule 1 Capsule(s) Inhalation daily  venlafaxine XR. 37.5 milliGRAM(s) Oral daily    MEDICATIONS  (PRN):  ALBUTerol    90 MICROgram(s) HFA Inhaler 2 Puff(s) Inhalation every 6 hours PRN Shortness of Breath and/or Wheezing  dextrose Oral Gel 15 Gram(s) Oral once PRN Blood Glucose LESS THAN 70 milliGRAM(s)/deciliter  ketorolac   Injectable 15 milliGRAM(s) IV Push every 8 hours PRN Severe Pain (7 - 10)  simethicone 80 milliGRAM(s) Chew four times a day PRN Gas Pain  sodium chloride 3%  Inhalation 4 milliLiter(s) Inhalation every 8 hours PRN with aerobica/airway clearance      Vital Signs Last 24 Hrs  T(C): 37.1 (03 Oct 2022 09:37), Max: 37.1 (03 Oct 2022 09:37)  T(F): 98.7 (03 Oct 2022 09:37), Max: 98.7 (03 Oct 2022 09:37)  HR: 84 (03 Oct 2022 09:37) (79 - 104)  BP: 130/79 (03 Oct 2022 09:37) (108/82 - 153/86)  BP(mean): --  RR: 18 (03 Oct 2022 09:37) (17 - 18)  SpO2: 98% (03 Oct 2022 09:37) (95% - 100%)    Parameters below as of 03 Oct 2022 09:51  Patient On (Oxygen Delivery Method): nasal cannula      CAPILLARY BLOOD GLUCOSE      POCT Blood Glucose.: 224 mg/dL (03 Oct 2022 11:57)  POCT Blood Glucose.: 200 mg/dL (03 Oct 2022 07:36)  POCT Blood Glucose.: 112 mg/dL (02 Oct 2022 22:38)  POCT Blood Glucose.: 248 mg/dL (02 Oct 2022 16:52)    I&O's Summary    02 Oct 2022 07:01  -  03 Oct 2022 07:00  --------------------------------------------------------  IN: 820 mL / OUT: 1730 mL / NET: -910 mL        PHYSICAL EXAM:  GENERAL: NAD, well-developed  HEAD:  Atraumatic, Normocephalic  EYES: EOMI, PERRLA, conjunctiva and sclera clear  NECK: Supple, No JVD  CHEST/LUNG: (+) rhonchi and crackles b/l; No wheeze  HEART: Regular rate and rhythm; No murmurs, rubs, or gallops  ABDOMEN: Soft, Nontender, Nondistended; Bowel sounds present  EXTREMITIES:  2+ Peripheral Pulses, No clubbing, cyanosis, or edema  PSYCH: AAOx3  NEUROLOGY: non-focal  SKIN: No rashes or lesions    LABS:                        13.1   9.35  )-----------( 199      ( 03 Oct 2022 07:46 )             40.3     10-03    135  |  94<L>  |  6<L>  ----------------------------<  213<H>  4.5   |  34<H>  |  0.28<L>    Ca    9.5      03 Oct 2022 07:46  Phos  3.0     10-03  Mg     2.00     10-03    TPro  6.5  /  Alb  3.1<L>  /  TBili  0.2  /  DBili  x   /  AST  31  /  ALT  34<H>  /  AlkPhos  199<H>  10-03      < from: MR MRCP No Cont (10.02.22 @ 14:47) >  IMPRESSION:  Pneumobilia ; no intrahepatic or extrahepatic biliary dilatation.    Hepaticojejunostomy. Pneumobilia but no choledocholithiasis in the common   hepatic duct leading to the hepaticojejunostomy.    Previously seen calculi in CT are located in the distal preampullary   precluded CBD.        --- End of Report ---            CURTIS WOOD MD; Attending Radiologist  This document has been electronically signed. Oct  2 2022  3:41PM    < end of copied text >          RADIOLOGY & ADDITIONAL TESTS:    Imaging Personally Reviewed:    Consultant(s) Notes Reviewed:  Pulm     Care Discussed with Consultants/Other Providers:

## 2022-10-03 NOTE — PROGRESS NOTE ADULT - SUBJECTIVE AND OBJECTIVE BOX
Follow Up:  bronchiectasis exacerbation     Interval History/ROS, no fever, improved cough and SOB, still with some abd pain, no vomiting, no dysuria          Allergies  No Known Allergies        ANTIMICROBIALS:  colistimethate for Nebulization 150 every 12 hours  meropenem  IVPB 1000 every 8 hours  nystatin    Suspension [id number] daily      OTHER MEDS:  acetaminophen     Tablet .. 500 milliGRAM(s) Oral every 6 hours  ALBUTerol    90 MICROgram(s) HFA Inhaler 2 Puff(s) Inhalation every 6 hours PRN  albuterol/ipratropium for Nebulization 3 milliLiter(s) Nebulizer every 6 hours  amLODIPine   Tablet 5 milliGRAM(s) Oral daily  bisacodyl 5 milliGRAM(s) Oral at bedtime  budesonide 160 MICROgram(s)/formoterol 4.5 MICROgram(s) Inhaler 2 Puff(s) Inhalation two times a day  dextrose 5%. 1000 milliLiter(s) IV Continuous <Continuous>  dextrose 5%. 1000 milliLiter(s) IV Continuous <Continuous>  dextrose 50% Injectable 25 Gram(s) IV Push once  dextrose 50% Injectable 12.5 Gram(s) IV Push once  dextrose 50% Injectable 25 Gram(s) IV Push once  dextrose Oral Gel 15 Gram(s) Oral once PRN  enoxaparin Injectable 30 milliGRAM(s) SubCutaneous every 24 hours  ferrous    sulfate 325 milliGRAM(s) Oral daily  fluticasone propionate 50 MICROgram(s)/spray Nasal Spray 1 Spray(s) Both Nostrils two times a day  glucagon  Injectable 1 milliGRAM(s) IntraMuscular once  influenza  Vaccine (HIGH DOSE) 0.7 milliLiter(s) IntraMuscular once  insulin glargine Injectable (LANTUS) 6 Unit(s) SubCutaneous at bedtime  insulin lispro (ADMELOG) corrective regimen sliding scale   SubCutaneous three times a day before meals  insulin lispro (ADMELOG) corrective regimen sliding scale   SubCutaneous at bedtime  insulin lispro Injectable (ADMELOG) 4 Unit(s) SubCutaneous three times a day before meals  ketorolac   Injectable 15 milliGRAM(s) IV Push every 8 hours PRN  lidocaine/prilocaine Cream 1 Application(s) Topical daily  metoprolol succinate ER 50 milliGRAM(s) Oral daily  montelukast 10 milliGRAM(s) Oral daily  multivitamin 1 Tablet(s) Oral daily  pantoprazole    Tablet 40 milliGRAM(s) Oral before breakfast  polyethylene glycol 3350 17 Gram(s) Oral two times a day  simethicone 80 milliGRAM(s) Chew four times a day PRN  sodium chloride 3%  Inhalation 4 milliLiter(s) Inhalation every 8 hours PRN  tiotropium 18 MICROgram(s) Capsule 1 Capsule(s) Inhalation daily  venlafaxine XR. 37.5 milliGRAM(s) Oral daily      Vital Signs Last 24 Hrs  T(C): 37.1 (03 Oct 2022 09:37), Max: 37.1 (03 Oct 2022 09:37)  T(F): 98.7 (03 Oct 2022 09:37), Max: 98.7 (03 Oct 2022 09:37)  HR: 84 (03 Oct 2022 09:37) (79 - 104)  BP: 130/79 (03 Oct 2022 09:37) (108/82 - 153/86)  BP(mean): --  RR: 18 (03 Oct 2022 09:37) (17 - 18)  SpO2: 98% (03 Oct 2022 09:37) (95% - 100%)    Parameters below as of 03 Oct 2022 09:51  Patient On (Oxygen Delivery Method): nasal cannula        Physical Exam:  General:    NAD,  non toxic  Cardio:     regular S1, S2,  no murmur  Respiratory:  rhonchi  abd:     soft,   BS +,   no tenderness  :   no CVAT,  no suprapubic tenderness,   no  francisco  Musculoskeletal:   no joint swelling  vascular: no phlebitis  Skin:    no rash                          13.1   9.35  )-----------( 199      ( 03 Oct 2022 07:46 )             40.3       10-03    135  |  94<L>  |  6<L>  ----------------------------<  213<H>  4.5   |  34<H>  |  0.28<L>    Ca    9.5      03 Oct 2022 07:46  Phos  3.0     10-03  Mg     2.00     10-03    TPro  6.5  /  Alb  3.1<L>  /  TBili  0.2  /  DBili  x   /  AST  31  /  ALT  34<H>  /  AlkPhos  199<H>  10-03          MICROBIOLOGY:  v  .Sputum Sputum  09-28-22   Numerous Pseudomonas aeruginosa  Normal Respiratory Denise present  --  Pseudomonas aeruginosa      Clean Catch Clean Catch (Midstream)  09-28-22   No growth  --  --          Rapid RVP Result: NotDetec (09-28 @ 01:36)        RADIOLOGY:  Images independently visualized and reviewed personally, findings as below  < from: MR MRCP No Cont (10.02.22 @ 14:47) >  Pneumobilia ; no intrahepatic or extrahepatic biliary dilatation.    Hepaticojejunostomy. Pneumobilia but no choledocholithiasis in the common   hepatic duct leading to the hepaticojejunostomy.    Previously seen calculi in CT are located in the distal preampullary   precluded CBD.      < end of copied text >  < from: CT Chest w/ IV Cont (09.28.22 @ 03:28) >  IMPRESSION:    1. Pulmonary findings are compatible with a chronic infectious or   inflammatory bronchitis and bronchiolitis.  2. Punctate nonobstructing choledocholithiasis in the distal common duct,   new from the prior study.    < end of copied text >

## 2022-10-04 DIAGNOSIS — R73.9 HYPERGLYCEMIA, UNSPECIFIED: ICD-10-CM

## 2022-10-04 LAB
ALBUMIN SERPL ELPH-MCNC: 3.2 G/DL — LOW (ref 3.3–5)
ALP SERPL-CCNC: 209 U/L — HIGH (ref 40–120)
ALT FLD-CCNC: 33 U/L — SIGNIFICANT CHANGE UP (ref 4–33)
ANION GAP SERPL CALC-SCNC: 9 MMOL/L — SIGNIFICANT CHANGE UP (ref 7–14)
AST SERPL-CCNC: 34 U/L — HIGH (ref 4–32)
BASOPHILS # BLD AUTO: 0.05 K/UL — SIGNIFICANT CHANGE UP (ref 0–0.2)
BASOPHILS NFR BLD AUTO: 0.5 % — SIGNIFICANT CHANGE UP (ref 0–2)
BILIRUB SERPL-MCNC: 0.2 MG/DL — SIGNIFICANT CHANGE UP (ref 0.2–1.2)
BUN SERPL-MCNC: 15 MG/DL — SIGNIFICANT CHANGE UP (ref 7–23)
CALCIUM SERPL-MCNC: 9.9 MG/DL — SIGNIFICANT CHANGE UP (ref 8.4–10.5)
CHLORIDE SERPL-SCNC: 91 MMOL/L — LOW (ref 98–107)
CO2 SERPL-SCNC: 31 MMOL/L — SIGNIFICANT CHANGE UP (ref 22–31)
CREAT SERPL-MCNC: 0.33 MG/DL — LOW (ref 0.5–1.3)
EGFR: 115 ML/MIN/1.73M2 — SIGNIFICANT CHANGE UP
EOSINOPHIL # BLD AUTO: 0.04 K/UL — SIGNIFICANT CHANGE UP (ref 0–0.5)
EOSINOPHIL NFR BLD AUTO: 0.4 % — SIGNIFICANT CHANGE UP (ref 0–6)
GLUCOSE BLDC GLUCOMTR-MCNC: 154 MG/DL — HIGH (ref 70–99)
GLUCOSE BLDC GLUCOMTR-MCNC: 225 MG/DL — HIGH (ref 70–99)
GLUCOSE BLDC GLUCOMTR-MCNC: 349 MG/DL — HIGH (ref 70–99)
GLUCOSE BLDC GLUCOMTR-MCNC: 378 MG/DL — HIGH (ref 70–99)
GLUCOSE SERPL-MCNC: 244 MG/DL — HIGH (ref 70–99)
HCT VFR BLD CALC: 42.5 % — SIGNIFICANT CHANGE UP (ref 34.5–45)
HGB BLD-MCNC: 13.4 G/DL — SIGNIFICANT CHANGE UP (ref 11.5–15.5)
IANC: 6.81 K/UL — SIGNIFICANT CHANGE UP (ref 1.8–7.4)
IMM GRANULOCYTES NFR BLD AUTO: 1.2 % — HIGH (ref 0–0.9)
LYMPHOCYTES # BLD AUTO: 2.61 K/UL — SIGNIFICANT CHANGE UP (ref 1–3.3)
LYMPHOCYTES # BLD AUTO: 25.3 % — SIGNIFICANT CHANGE UP (ref 13–44)
MAGNESIUM SERPL-MCNC: 2.1 MG/DL — SIGNIFICANT CHANGE UP (ref 1.6–2.6)
MCHC RBC-ENTMCNC: 27.1 PG — SIGNIFICANT CHANGE UP (ref 27–34)
MCHC RBC-ENTMCNC: 31.5 GM/DL — LOW (ref 32–36)
MCV RBC AUTO: 86 FL — SIGNIFICANT CHANGE UP (ref 80–100)
MONOCYTES # BLD AUTO: 0.69 K/UL — SIGNIFICANT CHANGE UP (ref 0–0.9)
MONOCYTES NFR BLD AUTO: 6.7 % — SIGNIFICANT CHANGE UP (ref 2–14)
NEUTROPHILS # BLD AUTO: 6.81 K/UL — SIGNIFICANT CHANGE UP (ref 1.8–7.4)
NEUTROPHILS NFR BLD AUTO: 65.9 % — SIGNIFICANT CHANGE UP (ref 43–77)
NRBC # BLD: 0 /100 WBCS — SIGNIFICANT CHANGE UP (ref 0–0)
NRBC # FLD: 0 K/UL — SIGNIFICANT CHANGE UP (ref 0–0)
PHOSPHATE SERPL-MCNC: 3.3 MG/DL — SIGNIFICANT CHANGE UP (ref 2.5–4.5)
PLATELET # BLD AUTO: 168 K/UL — SIGNIFICANT CHANGE UP (ref 150–400)
POTASSIUM SERPL-MCNC: 4.8 MMOL/L — SIGNIFICANT CHANGE UP (ref 3.5–5.3)
POTASSIUM SERPL-SCNC: 4.8 MMOL/L — SIGNIFICANT CHANGE UP (ref 3.5–5.3)
PROT SERPL-MCNC: 6.9 G/DL — SIGNIFICANT CHANGE UP (ref 6–8.3)
RBC # BLD: 4.94 M/UL — SIGNIFICANT CHANGE UP (ref 3.8–5.2)
RBC # FLD: 13.3 % — SIGNIFICANT CHANGE UP (ref 10.3–14.5)
SODIUM SERPL-SCNC: 131 MMOL/L — LOW (ref 135–145)
WBC # BLD: 10.32 K/UL — SIGNIFICANT CHANGE UP (ref 3.8–10.5)
WBC # FLD AUTO: 10.32 K/UL — SIGNIFICANT CHANGE UP (ref 3.8–10.5)

## 2022-10-04 PROCEDURE — 93010 ELECTROCARDIOGRAM REPORT: CPT

## 2022-10-04 PROCEDURE — 99232 SBSQ HOSP IP/OBS MODERATE 35: CPT | Mod: GC

## 2022-10-04 PROCEDURE — 99232 SBSQ HOSP IP/OBS MODERATE 35: CPT

## 2022-10-04 PROCEDURE — 99233 SBSQ HOSP IP/OBS HIGH 50: CPT

## 2022-10-04 RX ORDER — INSULIN GLARGINE 100 [IU]/ML
8 INJECTION, SOLUTION SUBCUTANEOUS AT BEDTIME
Refills: 0 | Status: DISCONTINUED | OUTPATIENT
Start: 2022-10-04 | End: 2022-10-05

## 2022-10-04 RX ORDER — POLYETHYLENE GLYCOL 3350 17 G/17G
17 POWDER, FOR SOLUTION ORAL
Refills: 0 | Status: DISCONTINUED | OUTPATIENT
Start: 2022-10-04 | End: 2022-10-10

## 2022-10-04 RX ORDER — INSULIN LISPRO 100/ML
6 VIAL (ML) SUBCUTANEOUS
Refills: 0 | Status: DISCONTINUED | OUTPATIENT
Start: 2022-10-04 | End: 2022-10-05

## 2022-10-04 RX ORDER — INSULIN LISPRO 100/ML
VIAL (ML) SUBCUTANEOUS
Refills: 0 | Status: DISCONTINUED | OUTPATIENT
Start: 2022-10-04 | End: 2022-10-10

## 2022-10-04 RX ADMIN — Medication 5 MILLIGRAM(S): at 21:34

## 2022-10-04 RX ADMIN — SODIUM CHLORIDE 4 MILLILITER(S): 9 INJECTION INTRAMUSCULAR; INTRAVENOUS; SUBCUTANEOUS at 11:50

## 2022-10-04 RX ADMIN — BUDESONIDE AND FORMOTEROL FUMARATE DIHYDRATE 2 PUFF(S): 160; 4.5 AEROSOL RESPIRATORY (INHALATION) at 09:38

## 2022-10-04 RX ADMIN — Medication 3 MILLILITER(S): at 22:58

## 2022-10-04 RX ADMIN — MONTELUKAST 10 MILLIGRAM(S): 4 TABLET, CHEWABLE ORAL at 12:16

## 2022-10-04 RX ADMIN — Medication 500 MILLIGRAM(S): at 07:15

## 2022-10-04 RX ADMIN — Medication 4 UNIT(S): at 08:00

## 2022-10-04 RX ADMIN — Medication 20 MILLIGRAM(S): at 06:48

## 2022-10-04 RX ADMIN — INSULIN GLARGINE 8 UNIT(S): 100 INJECTION, SOLUTION SUBCUTANEOUS at 21:35

## 2022-10-04 RX ADMIN — Medication 5: at 12:12

## 2022-10-04 RX ADMIN — Medication 500 MILLIGRAM(S): at 23:19

## 2022-10-04 RX ADMIN — BUDESONIDE AND FORMOTEROL FUMARATE DIHYDRATE 2 PUFF(S): 160; 4.5 AEROSOL RESPIRATORY (INHALATION) at 21:40

## 2022-10-04 RX ADMIN — Medication 6 UNIT(S): at 12:13

## 2022-10-04 RX ADMIN — PANTOPRAZOLE SODIUM 40 MILLIGRAM(S): 20 TABLET, DELAYED RELEASE ORAL at 06:47

## 2022-10-04 RX ADMIN — POLYETHYLENE GLYCOL 3350 17 GRAM(S): 17 POWDER, FOR SOLUTION ORAL at 13:42

## 2022-10-04 RX ADMIN — Medication 500 MILLIGRAM(S): at 17:16

## 2022-10-04 RX ADMIN — Medication 500 MILLIGRAM(S): at 00:15

## 2022-10-04 RX ADMIN — Medication 6 UNIT(S): at 17:14

## 2022-10-04 RX ADMIN — COLISTIMETHATE SODIUM 150 MILLIGRAM(S) CBA: 150 INJECTION INTRAMUSCULAR; INTRAVENOUS at 11:50

## 2022-10-04 RX ADMIN — Medication 8: at 17:15

## 2022-10-04 RX ADMIN — Medication 3 MILLILITER(S): at 11:50

## 2022-10-04 RX ADMIN — Medication 2: at 07:55

## 2022-10-04 RX ADMIN — Medication 3 MILLILITER(S): at 05:06

## 2022-10-04 RX ADMIN — PIPERACILLIN AND TAZOBACTAM 25 GRAM(S): 4; .5 INJECTION, POWDER, LYOPHILIZED, FOR SOLUTION INTRAVENOUS at 23:09

## 2022-10-04 RX ADMIN — Medication 50 MILLIGRAM(S): at 06:48

## 2022-10-04 RX ADMIN — Medication 500 MILLIGRAM(S): at 06:47

## 2022-10-04 RX ADMIN — PIPERACILLIN AND TAZOBACTAM 25 GRAM(S): 4; .5 INJECTION, POWDER, LYOPHILIZED, FOR SOLUTION INTRAVENOUS at 16:36

## 2022-10-04 RX ADMIN — Medication 1 SPRAY(S): at 06:47

## 2022-10-04 RX ADMIN — AMLODIPINE BESYLATE 5 MILLIGRAM(S): 2.5 TABLET ORAL at 06:48

## 2022-10-04 RX ADMIN — COLISTIMETHATE SODIUM 150 MILLIGRAM(S) CBA: 150 INJECTION INTRAMUSCULAR; INTRAVENOUS at 22:58

## 2022-10-04 RX ADMIN — Medication 1 SPRAY(S): at 17:17

## 2022-10-04 RX ADMIN — TIOTROPIUM BROMIDE 1 CAPSULE(S): 18 CAPSULE ORAL; RESPIRATORY (INHALATION) at 09:37

## 2022-10-04 RX ADMIN — PIPERACILLIN AND TAZOBACTAM 25 GRAM(S): 4; .5 INJECTION, POWDER, LYOPHILIZED, FOR SOLUTION INTRAVENOUS at 06:46

## 2022-10-04 RX ADMIN — Medication 500000 UNIT(S): at 12:15

## 2022-10-04 RX ADMIN — Medication 1 TABLET(S): at 12:18

## 2022-10-04 RX ADMIN — ENOXAPARIN SODIUM 30 MILLIGRAM(S): 100 INJECTION SUBCUTANEOUS at 13:41

## 2022-10-04 RX ADMIN — Medication 37.5 MILLIGRAM(S): at 12:16

## 2022-10-04 RX ADMIN — Medication 325 MILLIGRAM(S): at 12:16

## 2022-10-04 RX ADMIN — Medication 500 MILLIGRAM(S): at 12:15

## 2022-10-04 NOTE — PROGRESS NOTE ADULT - ASSESSMENT
67F with PMH primary ciliary dyskinesia (PCD) and sequelae, including chronic hypoxemic hypercapnic respiratory failure (on home O2 at 2-3L NC and AVAPS at night), recurrent admissions for bronchiectasis/COPD exacerbations and pneumonia, MDR Pseudomonas colonization, constipation, cirrhosis, T2DM on insulin, GERD; anxiety, depression, PSH remote cholecystectomy c/b strictures with remote PTC/PTBD/stent, presenting with 2-3 weeks of diffuse abdominal pain worst in epigastric region, found to also have bronchiectasis exacerbation possibly 2/2 infection, now on Zosyn.

## 2022-10-04 NOTE — PROGRESS NOTE ADULT - ATTENDING COMMENTS
67 year old female with primary ciliary dyskinesia, bronchiectasis, and chronic hypoxemic and hypercapnic respiratory on AVAPS who presents with abdominal pain and increased sputum production on treatment for acute exacerbation of her bronchiectasis.   Clinically improved with prednisone.  Continue Meropenem and inhaled colistin.  Sputum culture growing Pseudomonas.  Bronchodilator and home airway clearance regimen.  Supplemental O2 during daytime to target SpO2 > 92.  AVAPS qHS.

## 2022-10-04 NOTE — PROGRESS NOTE ADULT - SUBJECTIVE AND OBJECTIVE BOX
Patient is a 67y old  Female who presents with a chief complaint of abdominal pain (04 Oct 2022 13:12)      SUBJECTIVE / OVERNIGHT EVENTS: Pt seen and examined with LION Doty. SOB improving, (+) constipation. feeling bloated.      MEDICATIONS  (STANDING):  acetaminophen     Tablet .. 500 milliGRAM(s) Oral every 6 hours  albuterol/ipratropium for Nebulization 3 milliLiter(s) Nebulizer every 6 hours  amLODIPine   Tablet 5 milliGRAM(s) Oral daily  bisacodyl 5 milliGRAM(s) Oral at bedtime  budesonide 160 MICROgram(s)/formoterol 4.5 MICROgram(s) Inhaler 2 Puff(s) Inhalation two times a day  colistimethate for Nebulization 150 milliGRAM(s) CBA Nebulizer every 12 hours  dextrose 5%. 1000 milliLiter(s) (50 mL/Hr) IV Continuous <Continuous>  dextrose 5%. 1000 milliLiter(s) (100 mL/Hr) IV Continuous <Continuous>  dextrose 50% Injectable 25 Gram(s) IV Push once  dextrose 50% Injectable 12.5 Gram(s) IV Push once  dextrose 50% Injectable 25 Gram(s) IV Push once  enoxaparin Injectable 30 milliGRAM(s) SubCutaneous every 24 hours  ferrous    sulfate 325 milliGRAM(s) Oral daily  fluticasone propionate 50 MICROgram(s)/spray Nasal Spray 1 Spray(s) Both Nostrils two times a day  glucagon  Injectable 1 milliGRAM(s) IntraMuscular once  influenza  Vaccine (HIGH DOSE) 0.7 milliLiter(s) IntraMuscular once  insulin glargine Injectable (LANTUS) 8 Unit(s) SubCutaneous at bedtime  insulin lispro (ADMELOG) corrective regimen sliding scale   SubCutaneous three times a day before meals  insulin lispro (ADMELOG) corrective regimen sliding scale   SubCutaneous at bedtime  insulin lispro Injectable (ADMELOG) 6 Unit(s) SubCutaneous three times a day before meals  lidocaine/prilocaine Cream 1 Application(s) Topical daily  metoprolol succinate ER 50 milliGRAM(s) Oral daily  montelukast 10 milliGRAM(s) Oral daily  multivitamin 1 Tablet(s) Oral daily  nystatin    Suspension 182949 Unit(s) Oral daily  pantoprazole    Tablet 40 milliGRAM(s) Oral before breakfast  piperacillin/tazobactam IVPB.. 3.375 Gram(s) IV Intermittent every 8 hours  polyethylene glycol 3350 17 Gram(s) Oral two times a day  predniSONE   Tablet 20 milliGRAM(s) Oral daily  tiotropium 18 MICROgram(s) Capsule 1 Capsule(s) Inhalation daily  venlafaxine XR. 37.5 milliGRAM(s) Oral daily    MEDICATIONS  (PRN):  ALBUTerol    90 MICROgram(s) HFA Inhaler 2 Puff(s) Inhalation every 6 hours PRN Shortness of Breath and/or Wheezing  dextrose Oral Gel 15 Gram(s) Oral once PRN Blood Glucose LESS THAN 70 milliGRAM(s)/deciliter  simethicone 80 milliGRAM(s) Chew four times a day PRN Gas Pain  sodium chloride 3%  Inhalation 4 milliLiter(s) Inhalation every 8 hours PRN with aerobica/airway clearance      Vital Signs Last 24 Hrs  T(C): 36.4 (04 Oct 2022 13:47), Max: 36.7 (03 Oct 2022 17:42)  T(F): 97.6 (04 Oct 2022 13:47), Max: 98.1 (03 Oct 2022 17:42)  HR: 104 (04 Oct 2022 13:47) (90 - 115)  BP: 138/69 (04 Oct 2022 13:47) (138/69 - 163/86)  BP(mean): --  RR: 17 (04 Oct 2022 13:47) (16 - 20)  SpO2: 98% (04 Oct 2022 13:47) (94% - 99%)    Parameters below as of 04 Oct 2022 13:47    O2 Flow (L/min): 3    CAPILLARY BLOOD GLUCOSE      POCT Blood Glucose.: 378 mg/dL (04 Oct 2022 12:04)  POCT Blood Glucose.: 225 mg/dL (04 Oct 2022 07:09)  POCT Blood Glucose.: 222 mg/dL (03 Oct 2022 21:05)  POCT Blood Glucose.: 227 mg/dL (03 Oct 2022 16:56)    I&O's Summary    03 Oct 2022 07:01  -  04 Oct 2022 07:00  --------------------------------------------------------  IN: 320 mL / OUT: 1400 mL / NET: -1080 mL        PHYSICAL EXAM:  GENERAL: NAD, well-developed  HEAD:  Atraumatic, Normocephalic  EYES: EOMI, PERRLA, conjunctiva and sclera clear  NECK: Supple, No JVD  CHEST/LUNG: (+) scattered crackles and rhonchi  bilaterally; Improving from yesterday.  No wheeze  HEART: Regular rate and rhythm; No murmurs, rubs, or gallops  ABDOMEN: Soft, Nontender, Nondistended; Bowel sounds present  EXTREMITIES:  2+ Peripheral Pulses, No clubbing, cyanosis, or edema  PSYCH: AAOx3  NEUROLOGY: non-focal  SKIN: No rashes or lesions    LABS:                        13.4   10.32 )-----------( 168      ( 04 Oct 2022 07:24 )             42.5     10-04    131<L>  |  91<L>  |  15  ----------------------------<  244<H>  4.8   |  31  |  0.33<L>    Ca    9.9      04 Oct 2022 07:24  Phos  3.3     10-04  Mg     2.10     10-04    TPro  6.9  /  Alb  3.2<L>  /  TBili  0.2  /  DBili  x   /  AST  34<H>  /  ALT  33  /  AlkPhos  209<H>  10-04              RADIOLOGY & ADDITIONAL TESTS:    Imaging Personally Reviewed:    Consultant(s) Notes Reviewed:  Pulm     Care Discussed with Consultants/Other Providers:

## 2022-10-04 NOTE — PROGRESS NOTE ADULT - SUBJECTIVE AND OBJECTIVE BOX
Chief Complaint: DM 2 with hyperglycemia exacerbated by steroids     History: Patient seen at bedside with  present. Patient and  chose to contact daughter Mio for interpretation to Leandro  (they called her from their phone, [phone])  Patient tolerating PO, eating lunch at time of visit  Patient was started on steroids yesterday 10/3, Prednisone 20 mg daily  Hyperglycemia noted     MEDICATIONS  (STANDING):  acetaminophen     Tablet .. 500 milliGRAM(s) Oral every 6 hours  albuterol/ipratropium for Nebulization 3 milliLiter(s) Nebulizer every 6 hours  amLODIPine   Tablet 5 milliGRAM(s) Oral daily  bisacodyl 5 milliGRAM(s) Oral at bedtime  budesonide 160 MICROgram(s)/formoterol 4.5 MICROgram(s) Inhaler 2 Puff(s) Inhalation two times a day  colistimethate for Nebulization 150 milliGRAM(s) CBA Nebulizer every 12 hours  dextrose 5%. 1000 milliLiter(s) (50 mL/Hr) IV Continuous <Continuous>  dextrose 5%. 1000 milliLiter(s) (100 mL/Hr) IV Continuous <Continuous>  dextrose 50% Injectable 25 Gram(s) IV Push once  dextrose 50% Injectable 12.5 Gram(s) IV Push once  dextrose 50% Injectable 25 Gram(s) IV Push once  enoxaparin Injectable 30 milliGRAM(s) SubCutaneous every 24 hours  ferrous    sulfate 325 milliGRAM(s) Oral daily  fluticasone propionate 50 MICROgram(s)/spray Nasal Spray 1 Spray(s) Both Nostrils two times a day  glucagon  Injectable 1 milliGRAM(s) IntraMuscular once  influenza  Vaccine (HIGH DOSE) 0.7 milliLiter(s) IntraMuscular once  insulin glargine Injectable (LANTUS) 6 Unit(s) SubCutaneous at bedtime  insulin lispro (ADMELOG) corrective regimen sliding scale   SubCutaneous three times a day before meals  insulin lispro (ADMELOG) corrective regimen sliding scale   SubCutaneous at bedtime  insulin lispro Injectable (ADMELOG) 6 Unit(s) SubCutaneous three times a day before meals  lidocaine/prilocaine Cream 1 Application(s) Topical daily  metoprolol succinate ER 50 milliGRAM(s) Oral daily  montelukast 10 milliGRAM(s) Oral daily  multivitamin 1 Tablet(s) Oral daily  nystatin    Suspension 722976 Unit(s) Oral daily  pantoprazole    Tablet 40 milliGRAM(s) Oral before breakfast  piperacillin/tazobactam IVPB.. 3.375 Gram(s) IV Intermittent every 8 hours  polyethylene glycol 3350 17 Gram(s) Oral two times a day  predniSONE   Tablet 20 milliGRAM(s) Oral daily  tiotropium 18 MICROgram(s) Capsule 1 Capsule(s) Inhalation daily  venlafaxine XR. 37.5 milliGRAM(s) Oral daily    MEDICATIONS  (PRN):  ALBUTerol    90 MICROgram(s) HFA Inhaler 2 Puff(s) Inhalation every 6 hours PRN Shortness of Breath and/or Wheezing  dextrose Oral Gel 15 Gram(s) Oral once PRN Blood Glucose LESS THAN 70 milliGRAM(s)/deciliter  simethicone 80 milliGRAM(s) Chew four times a day PRN Gas Pain  sodium chloride 3%  Inhalation 4 milliLiter(s) Inhalation every 8 hours PRN with aerobica/airway clearance    No Known Allergies    Review of Systems:  Cardiovascular: No chest pain  Respiratory: No SOB  GI: No nausea, vomiting  Endocrine: no hypoglycemia     PHYSICAL EXAM:  VITALS: T(C): 36.5 (10-04-22 @ 10:02)  T(F): 97.7 (10-04-22 @ 10:02), Max: 98.1 (10-03-22 @ 17:42)  HR: 94 (10-04-22 @ 10:02) (90 - 115)  BP: 142/72 (10-04-22 @ 10:02) (141/68 - 163/86)  RR:  (16 - 20)  SpO2:  (94% - 99%)  Wt(kg): --  GENERAL: NAD  EYES: No proptosis, no lid lag, anicteric  HEENT:  Atraumatic, Normocephalic, moist mucous membranes  RESPIRATORY: unlabored respirations     CAPILLARY BLOOD GLUCOSE    POCT Blood Glucose.: 378 mg/dL (04 Oct 2022 12:04)  POCT Blood Glucose.: 225 mg/dL (04 Oct 2022 07:09)  POCT Blood Glucose.: 222 mg/dL (03 Oct 2022 21:05)  POCT Blood Glucose.: 227 mg/dL (03 Oct 2022 16:56)      10-04    131<L>  |  91<L>  |  15  ----------------------------<  244<H>  4.8   |  31  |  0.33<L>    eGFR: 115    Ca    9.9      10-04  Mg     2.10     10-04  Phos  3.3     10-04    TPro  6.9  /  Alb  3.2<L>  /  TBili  0.2  /  DBili  x   /  AST  34<H>  /  ALT  33  /  AlkPhos  209<H>  10-04    A1C with Estimated Average Glucose Result: 9.4 % (09-29-22 @ 05:00)  A1C with Estimated Average Glucose Result: 9.4 % (01-21-22 @ 14:26)    Diet, Consistent Carbohydrate/No Snacks:   Gastroesophageal Reflux Disease (GERD)  DASH/TLC Sodium & Cholesterol Restricted (DASH)  No Beef  No Pork  Supplement Feeding Modality:  Oral  Glucerna Shake Cans or Servings Per Day:  2       Frequency:  Daily (09-30-22 @ 08:27) [Active]        Chief Complaint:     History:    MEDICATIONS  (STANDING):  acetaminophen     Tablet .. 500 milliGRAM(s) Oral every 6 hours  albuterol/ipratropium for Nebulization 3 milliLiter(s) Nebulizer every 6 hours  amLODIPine   Tablet 5 milliGRAM(s) Oral daily  bisacodyl 5 milliGRAM(s) Oral at bedtime  budesonide 160 MICROgram(s)/formoterol 4.5 MICROgram(s) Inhaler 2 Puff(s) Inhalation two times a day  colistimethate for Nebulization 150 milliGRAM(s) CBA Nebulizer every 12 hours  dextrose 5%. 1000 milliLiter(s) (50 mL/Hr) IV Continuous <Continuous>  dextrose 5%. 1000 milliLiter(s) (100 mL/Hr) IV Continuous <Continuous>  dextrose 50% Injectable 25 Gram(s) IV Push once  dextrose 50% Injectable 12.5 Gram(s) IV Push once  dextrose 50% Injectable 25 Gram(s) IV Push once  enoxaparin Injectable 30 milliGRAM(s) SubCutaneous every 24 hours  ferrous    sulfate 325 milliGRAM(s) Oral daily  fluticasone propionate 50 MICROgram(s)/spray Nasal Spray 1 Spray(s) Both Nostrils two times a day  glucagon  Injectable 1 milliGRAM(s) IntraMuscular once  influenza  Vaccine (HIGH DOSE) 0.7 milliLiter(s) IntraMuscular once  insulin glargine Injectable (LANTUS) 6 Unit(s) SubCutaneous at bedtime  insulin lispro (ADMELOG) corrective regimen sliding scale   SubCutaneous three times a day before meals  insulin lispro (ADMELOG) corrective regimen sliding scale   SubCutaneous at bedtime  insulin lispro Injectable (ADMELOG) 6 Unit(s) SubCutaneous three times a day before meals  lidocaine/prilocaine Cream 1 Application(s) Topical daily  metoprolol succinate ER 50 milliGRAM(s) Oral daily  montelukast 10 milliGRAM(s) Oral daily  multivitamin 1 Tablet(s) Oral daily  nystatin    Suspension 977489 Unit(s) Oral daily  pantoprazole    Tablet 40 milliGRAM(s) Oral before breakfast  piperacillin/tazobactam IVPB.. 3.375 Gram(s) IV Intermittent every 8 hours  polyethylene glycol 3350 17 Gram(s) Oral two times a day  predniSONE   Tablet 20 milliGRAM(s) Oral daily  tiotropium 18 MICROgram(s) Capsule 1 Capsule(s) Inhalation daily  venlafaxine XR. 37.5 milliGRAM(s) Oral daily    MEDICATIONS  (PRN):  ALBUTerol    90 MICROgram(s) HFA Inhaler 2 Puff(s) Inhalation every 6 hours PRN Shortness of Breath and/or Wheezing  dextrose Oral Gel 15 Gram(s) Oral once PRN Blood Glucose LESS THAN 70 milliGRAM(s)/deciliter  simethicone 80 milliGRAM(s) Chew four times a day PRN Gas Pain  sodium chloride 3%  Inhalation 4 milliLiter(s) Inhalation every 8 hours PRN with aerobica/airway clearance      Allergies    No Known Allergies    Intolerances      Review of Systems:  Constitutional: No fever  Eyes: No blurry vision  Neuro: No tremors  HEENT: No pain  Cardiovascular: No chest pain, palpitations  Respiratory: No SOB, no cough  GI: No nausea, vomiting, abdominal pain  : No dysuria  Skin: no rash  Psych: no depression  Endocrine: no polyuria, polydipsia  Hem/lymph: no swelling  Osteoporosis: no fractures    UNABLE TO OBTAIN    PHYSICAL EXAM:  VITALS: T(C): 36.5 (10-04-22 @ 10:02)  T(F): 97.7 (10-04-22 @ 10:02), Max: 98.1 (10-03-22 @ 17:42)  HR: 94 (10-04-22 @ 10:02) (90 - 115)  BP: 142/72 (10-04-22 @ 10:02) (141/68 - 163/86)  RR:  (16 - 20)  SpO2:  (94% - 99%)  Wt(kg): --  GENERAL: NAD, well-groomed, well-developed  EYES: No proptosis, no lid lag, anicteric  HEENT:  Atraumatic, Normocephalic, moist mucous membranes  THYROID: Normal size, no palpable nodules  RESPIRATORY: Clear to auscultation bilaterally; No rales, rhonchi, wheezing  CARDIOVASCULAR: Regular rate and rhythm; No murmurs; no peripheral edema  GI: Soft, nontender, non distended  SKIN: Dry, intact, No rashes or lesions  MUSCULOSKELETAL: Full range of motion, normal strength  NEURO: extraocular movements intact, no tremor  PSYCH: Alert and oriented x 3, normal affect, normal mood    CAPILLARY BLOOD GLUCOSE      POCT Blood Glucose.: 378 mg/dL (04 Oct 2022 12:04)  POCT Blood Glucose.: 225 mg/dL (04 Oct 2022 07:09)  POCT Blood Glucose.: 222 mg/dL (03 Oct 2022 21:05)  POCT Blood Glucose.: 227 mg/dL (03 Oct 2022 16:56)      10-04    131<L>  |  91<L>  |  15  ----------------------------<  244<H>  4.8   |  31  |  0.33<L>    eGFR: 115    Ca    9.9      10-04  Mg     2.10     10-04  Phos  3.3     10-04    TPro  6.9  /  Alb  3.2<L>  /  TBili  0.2  /  DBili  x   /  AST  34<H>  /  ALT  33  /  AlkPhos  209<H>  10-04      Thyroid Function Tests:      A1C with Estimated Average Glucose Result: 9.4 % (09-29-22 @ 05:00)  A1C with Estimated Average Glucose Result: 9.4 % (01-21-22 @ 14:26)     Chief Complaint: DM 2 with hyperglycemia exacerbated by steroids     History: Patient seen at bedside with  present. Patient and  chose to contact daughter Mio for interpretation to Leandro ([phone], they called her from their phone)  Patient tolerating PO, eating lunch at time of visit  Patient was started on steroids yesterday 10/3, Prednisone 20 mg daily  Hyperglycemia noted     MEDICATIONS  (STANDING):  acetaminophen     Tablet .. 500 milliGRAM(s) Oral every 6 hours  albuterol/ipratropium for Nebulization 3 milliLiter(s) Nebulizer every 6 hours  amLODIPine   Tablet 5 milliGRAM(s) Oral daily  bisacodyl 5 milliGRAM(s) Oral at bedtime  budesonide 160 MICROgram(s)/formoterol 4.5 MICROgram(s) Inhaler 2 Puff(s) Inhalation two times a day  colistimethate for Nebulization 150 milliGRAM(s) CBA Nebulizer every 12 hours  dextrose 5%. 1000 milliLiter(s) (50 mL/Hr) IV Continuous <Continuous>  dextrose 5%. 1000 milliLiter(s) (100 mL/Hr) IV Continuous <Continuous>  dextrose 50% Injectable 25 Gram(s) IV Push once  dextrose 50% Injectable 12.5 Gram(s) IV Push once  dextrose 50% Injectable 25 Gram(s) IV Push once  enoxaparin Injectable 30 milliGRAM(s) SubCutaneous every 24 hours  ferrous    sulfate 325 milliGRAM(s) Oral daily  fluticasone propionate 50 MICROgram(s)/spray Nasal Spray 1 Spray(s) Both Nostrils two times a day  glucagon  Injectable 1 milliGRAM(s) IntraMuscular once  influenza  Vaccine (HIGH DOSE) 0.7 milliLiter(s) IntraMuscular once  insulin glargine Injectable (LANTUS) 6 Unit(s) SubCutaneous at bedtime  insulin lispro (ADMELOG) corrective regimen sliding scale   SubCutaneous three times a day before meals  insulin lispro (ADMELOG) corrective regimen sliding scale   SubCutaneous at bedtime  insulin lispro Injectable (ADMELOG) 6 Unit(s) SubCutaneous three times a day before meals  lidocaine/prilocaine Cream 1 Application(s) Topical daily  metoprolol succinate ER 50 milliGRAM(s) Oral daily  montelukast 10 milliGRAM(s) Oral daily  multivitamin 1 Tablet(s) Oral daily  nystatin    Suspension 748664 Unit(s) Oral daily  pantoprazole    Tablet 40 milliGRAM(s) Oral before breakfast  piperacillin/tazobactam IVPB.. 3.375 Gram(s) IV Intermittent every 8 hours  polyethylene glycol 3350 17 Gram(s) Oral two times a day  predniSONE   Tablet 20 milliGRAM(s) Oral daily  tiotropium 18 MICROgram(s) Capsule 1 Capsule(s) Inhalation daily  venlafaxine XR. 37.5 milliGRAM(s) Oral daily    MEDICATIONS  (PRN):  ALBUTerol    90 MICROgram(s) HFA Inhaler 2 Puff(s) Inhalation every 6 hours PRN Shortness of Breath and/or Wheezing  dextrose Oral Gel 15 Gram(s) Oral once PRN Blood Glucose LESS THAN 70 milliGRAM(s)/deciliter  simethicone 80 milliGRAM(s) Chew four times a day PRN Gas Pain  sodium chloride 3%  Inhalation 4 milliLiter(s) Inhalation every 8 hours PRN with aerobica/airway clearance    No Known Allergies    Review of Systems:  Cardiovascular: No chest pain  Respiratory: No SOB  GI: No nausea, vomiting  Endocrine: no hypoglycemia     PHYSICAL EXAM:  VITALS: T(C): 36.5 (10-04-22 @ 10:02)  T(F): 97.7 (10-04-22 @ 10:02), Max: 98.1 (10-03-22 @ 17:42)  HR: 94 (10-04-22 @ 10:02) (90 - 115)  BP: 142/72 (10-04-22 @ 10:02) (141/68 - 163/86)  RR:  (16 - 20)  SpO2:  (94% - 99%)  Wt(kg): --  GENERAL: NAD  EYES: No proptosis, no lid lag, anicteric  HEENT:  Atraumatic, Normocephalic, moist mucous membranes  RESPIRATORY: unlabored respirations     CAPILLARY BLOOD GLUCOSE    POCT Blood Glucose.: 378 mg/dL (04 Oct 2022 12:04)  POCT Blood Glucose.: 225 mg/dL (04 Oct 2022 07:09)  POCT Blood Glucose.: 222 mg/dL (03 Oct 2022 21:05)  POCT Blood Glucose.: 227 mg/dL (03 Oct 2022 16:56)      10-04    131<L>  |  91<L>  |  15  ----------------------------<  244<H>  4.8   |  31  |  0.33<L>    eGFR: 115    Ca    9.9      10-04  Mg     2.10     10-04  Phos  3.3     10-04    TPro  6.9  /  Alb  3.2<L>  /  TBili  0.2  /  DBili  x   /  AST  34<H>  /  ALT  33  /  AlkPhos  209<H>  10-04    A1C with Estimated Average Glucose Result: 9.4 % (09-29-22 @ 05:00)  A1C with Estimated Average Glucose Result: 9.4 % (01-21-22 @ 14:26)    Diet, Consistent Carbohydrate/No Snacks:   Gastroesophageal Reflux Disease (GERD)  DASH/TLC Sodium & Cholesterol Restricted (DASH)  No Beef  No Pork  Supplement Feeding Modality:  Oral  Glucerna Shake Cans or Servings Per Day:  2       Frequency:  Daily (09-30-22 @ 08:27) [Active]

## 2022-10-04 NOTE — PROGRESS NOTE ADULT - ASSESSMENT
67 f with Primary Ciliary Dyskinesia, Chronic hyoxemic/hypercapneic Respiratory Failure on 2-3L Home O2 NC secondary to Bronchiectasis on AVAPS, Cirrhosis, Prior CBD stricture s/p dilation, stent placement and removal, HCV Ab+, presented to the ED for abdominal pain  afebrile, no WBC, LFTs unremarkable  chest/abd CT 9/28 : chronic infectious or inflammatory bronchitis and bronchiolitis.  Punctate nonobstructing choledocholithiasis in the distal common duct, new from the prior study.  GI evaluted the pt and recommended outpatient ERCP and MRCP was done 10/2 which showed Hepaticojejunostomy, Pneumobilia but no choledocholithiasis in CBD leading to the hepaticojejunostomy. Previously seen calculi in CT are located in the distal preampullary precluded CBD.  sputum cx with pseudomonas (R to genta, tobra, levo)    * c/w zosyn as the pseudomonas is sensitive to zosyn, antibiotics started 9/28, now day 7  * pt did not tolerate inhaled tobra and amikacin  * c/w inhaled colistin   * abd pain management as per GI  The above assessment and plan was discussed with the primary team    Urmila Rose MD  contact on teams  After 5pm and on weekends call [phone]

## 2022-10-04 NOTE — PROGRESS NOTE ADULT - ASSESSMENT
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  Endocrinology consulted for assistance with management of type 2 diabetes.    1. Type 2 Diabetes Mellitus, poorly controlled  HbA1c: 9.4%  Home Regimen: Lantus 15u HS, Lispro 5u AC  Suspect that her BG fluctuate at home with varying doses of steroids    While inpatient:  BG target 100-180 mg/dl  Increase Lantus to 8 units SQ qHS  Increase Admelog to 6 units SQ TID before meals (Hold if NPO/skips meal)   Increase Admelog correctional scale to moderate dose before meals (while on steroids), low dose at bedtime  Check BG before meals and bedtime  Consistent carbohydrate diet  Hypoglycemia protocol     Discharge Plan:  Basal/bolus insulin, doses to be determined.   Can consider using correctional scale to assist with better glucose control at home (per daughter, they are estimating, but no structured correctional scale)  Ensure patient has glucometer, glucose test strips, lancets, alcohol swabs and insulin PEN needles  Followup with [facility] [place], Endocrine Clinic: [address] [facility] [address], [phone]    2. Steroid induced hyperglycemia  Patient started on Prednisone 20 mg daily by pulmonology 10/3/22  Per primary team, no immediate plans for changing dose of Prednisone or tapering, it will be slowly tapered outpatient  Discussed with patient/family the effect of steroids on blood glucose (insulin will need to be adjusted with adjustment of steroid dose), they verbalize understanding    3. Hypertension  BP goal <130/80  Close to target on Metoprolol and Amlodipine   Further management as per primary team    4. Hyperlipidemia  LDL target less than 70  LDL resulted 60, not on statin   Ok to defer statin for now but would reconsider as outpatient. Patient is in statin benefit group, has type 2 DM and age 40-75    Sabrina Plaza  Nurse Practitioner  Division of Endocrinology & Diabetes  In house pager #[phone]/long range pager #[phone]    If before 9AM or after 6PM, or on weekends/holidays, please call endocrine answering service for assistance ([phone]).  For nonurgent matters email [email or web address] for assistance.

## 2022-10-04 NOTE — PROGRESS NOTE ADULT - SUBJECTIVE AND OBJECTIVE BOX
Follow Up:  bronchiectasis exacerbation     Interval History/ROS, no fever, improved cough and SOB, no vomiting, no dysuria, c/o constipation and bloating          Allergies  No Known Allergies        ANTIMICROBIALS:  colistimethate for Nebulization 150 every 12 hours  nystatin    Suspension 011166 daily  piperacillin/tazobactam IVPB.. 3.375 every 8 hours      OTHER MEDS:  acetaminophen     Tablet .. 500 milliGRAM(s) Oral every 6 hours  ALBUTerol    90 MICROgram(s) HFA Inhaler 2 Puff(s) Inhalation every 6 hours PRN  albuterol/ipratropium for Nebulization 3 milliLiter(s) Nebulizer every 6 hours  amLODIPine   Tablet 5 milliGRAM(s) Oral daily  bisacodyl 5 milliGRAM(s) Oral at bedtime  budesonide 160 MICROgram(s)/formoterol 4.5 MICROgram(s) Inhaler 2 Puff(s) Inhalation two times a day  dextrose 5%. 1000 milliLiter(s) IV Continuous <Continuous>  dextrose 5%. 1000 milliLiter(s) IV Continuous <Continuous>  dextrose 50% Injectable 25 Gram(s) IV Push once  dextrose 50% Injectable 12.5 Gram(s) IV Push once  dextrose 50% Injectable 25 Gram(s) IV Push once  dextrose Oral Gel 15 Gram(s) Oral once PRN  enoxaparin Injectable 30 milliGRAM(s) SubCutaneous every 24 hours  ferrous    sulfate 325 milliGRAM(s) Oral daily  fluticasone propionate 50 MICROgram(s)/spray Nasal Spray 1 Spray(s) Both Nostrils two times a day  glucagon  Injectable 1 milliGRAM(s) IntraMuscular once  influenza  Vaccine (HIGH DOSE) 0.7 milliLiter(s) IntraMuscular once  insulin glargine Injectable (LANTUS) 8 Unit(s) SubCutaneous at bedtime  insulin lispro (ADMELOG) corrective regimen sliding scale   SubCutaneous three times a day before meals  insulin lispro (ADMELOG) corrective regimen sliding scale   SubCutaneous at bedtime  insulin lispro Injectable (ADMELOG) 6 Unit(s) SubCutaneous three times a day before meals  lidocaine/prilocaine Cream 1 Application(s) Topical daily  metoprolol succinate ER 50 milliGRAM(s) Oral daily  montelukast 10 milliGRAM(s) Oral daily  multivitamin 1 Tablet(s) Oral daily  pantoprazole    Tablet 40 milliGRAM(s) Oral before breakfast  polyethylene glycol 3350 17 Gram(s) Oral two times a day  predniSONE   Tablet 20 milliGRAM(s) Oral daily  simethicone 80 milliGRAM(s) Chew four times a day PRN  sodium chloride 3%  Inhalation 4 milliLiter(s) Inhalation every 8 hours PRN  tiotropium 18 MICROgram(s) Capsule 1 Capsule(s) Inhalation daily  venlafaxine XR. 37.5 milliGRAM(s) Oral daily      Vital Signs Last 24 Hrs  T(C): 36.4 (04 Oct 2022 13:47), Max: 36.7 (03 Oct 2022 17:42)  T(F): 97.6 (04 Oct 2022 13:47), Max: 98.1 (03 Oct 2022 17:42)  HR: 104 (04 Oct 2022 13:47) (83 - 115)  BP: 138/69 (04 Oct 2022 13:47) (138/69 - 163/86)  BP(mean): --  RR: 17 (04 Oct 2022 13:47) (16 - 20)  SpO2: 98% (04 Oct 2022 13:47) (94% - 99%)    Parameters below as of 04 Oct 2022 13:47    O2 Flow (L/min): 3      Physical Exam:  General:    NAD,  non toxic  Cardio:     regular S1, S2,  no murmur  Respiratory:  rhonchi  abd:     soft,   BS +,   no tenderness  :   no CVAT,  no suprapubic tenderness,   no  francisco  Musculoskeletal:   no joint swelling  vascular: no phlebitis  Skin:    no rash                          13.4   10.32 )-----------( 168      ( 04 Oct 2022 07:24 )             42.5       10-04    131<L>  |  91<L>  |  15  ----------------------------<  244<H>  4.8   |  31  |  0.33<L>    Ca    9.9      04 Oct 2022 07:24  Phos  3.3     10-04  Mg     2.10     10-04    TPro  6.9  /  Alb  3.2<L>  /  TBili  0.2  /  DBili  x   /  AST  34<H>  /  ALT  33  /  AlkPhos  209<H>  10-04          MICROBIOLOGY:  v  .Sputum Sputum  09-28-22   Numerous Pseudomonas aeruginosa  Normal Respiratory Denise present  --  Pseudomonas aeruginosa      Clean Catch Clean Catch (Midstream)  09-28-22   No growth  --  --          Rapid RVP Result: NotDetec (09-28 @ 01:36)        RADIOLOGY:  Images independently visualized and reviewed personally, findings as below  < from: MR MRCP No Cont (10.02.22 @ 14:47) >  IMPRESSION:  Pneumobilia ; no intrahepatic or extrahepatic biliary dilatation.    Hepaticojejunostomy. Pneumobilia but no choledocholithiasis in the common   hepatic duct leading to the hepaticojejunostomy.    Previously seen calculi in CT are located in the distal preampullary   precluded CBD.    < end of copied text >  < from: CT Chest w/ IV Cont (09.28.22 @ 03:28) >  IMPRESSION:    1. Pulmonary findings are compatible with a chronic infectious or   inflammatory bronchitis and bronchiolitis.  2. Punctate nonobstructing choledocholithiasis in the distal common duct,   new from the prior study.    < end of copied text >

## 2022-10-04 NOTE — PROGRESS NOTE ADULT - PROBLEM SELECTOR PLAN 1
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 Last settings documented at [facility]:  AVAPS RR 18, EPAP 5, Min IP 15, Max IP 25,   - duonebs q6h  -Started Prednisone as per Pulm   - Zosyn+ inhaled Colistin per ID recs  - transfer to [facility] for specialized care when bed becomes available  - appreciate pulm and ID recs

## 2022-10-04 NOTE — PROGRESS NOTE ADULT - PROBLEM SELECTOR PLAN 3
# constipation possibly 2/2 PCD  - water enema once  - Miralax, senna, bisacodyl, simethicone po; stool count    # early satiety - possibly 2/2 bloating/constipation, though malignancy possible  - bowel reg as above    Monitor BM

## 2022-10-04 NOTE — PROGRESS NOTE ADULT - SUBJECTIVE AND OBJECTIVE BOX
CHIEF COMPLAINT:Patient is a 67y old  Female who presents with a chief complaint of abdominal pain (03 Oct 2022 17:00)      Interval Events:  Interview conducted via [facility]  Liborio #[phone]  No acute events overnight. Pt this AM reports improvement in her breathing status overall since admission and less sputum production. She is complaining of dry mouth and would like to try to use a nose mask at night as the dry mouth is causing her to have problems with her sleep.    REVIEW OF SYSTEMS:  [x] All other systems negative except per HPI   [ ] Unable to assess ROS because ________    OBJECTIVE:  ICU Vital Signs Last 24 Hrs  T(C): 36.5 (04 Oct 2022 10:02), Max: 36.7 (03 Oct 2022 17:42)  T(F): 97.7 (04 Oct 2022 10:02), Max: 98.1 (03 Oct 2022 17:42)  HR: 94 (04 Oct 2022 10:02) (90 - 115)  BP: 142/72 (04 Oct 2022 10:02) (141/68 - 163/86)  BP(mean): --  ABP: --  ABP(mean): --  RR: 17 (04 Oct 2022 10:02) (16 - 20)  SpO2: 98% (04 Oct 2022 10:02) (94% - 99%)    O2 Parameters below as of 04 Oct 2022 10:02  Patient On (Oxygen Delivery Method): room air          Mode: standby    10-03 @ 07:01  -  10-04 @ 07:00  --------------------------------------------------------  IN: 320 mL / OUT: 1400 mL / NET: -1080 mL        PHYSICAL EXAM:  GENERAL: NAD, well-groomed, well-developed, now able to speak in complete sentences  HEAD:  Atraumatic, Normocephalic  EYES: EOMI, PERRLA, conjunctiva and sclera clear  ENMT: No tonsillar erythema, exudates, or enlargement; Moist mucous membranes, Good dentition, No lesions  NECK: Supple, No JVD, Normal thyroid  CHEST/LUNG: (+) inspiratory crackles b/l, improved from prior  HEART: Regular rate and rhythm; No murmurs, rubs, or gallops  ABDOMEN: Soft, Nontender, Nondistended; Bowel sounds present  VASCULAR:  2+ Peripheral Pulses, No clubbing, cyanosis, or edema  LYMPH: No lymphadenopathy noted  SKIN: No rashes or lesions  NERVOUS SYSTEM:  Alert & Oriented X3, Good concentration; Motor Strength 5/5 B/L upper and lower extremities; DTRs 2+ intact and symmetric    HOSPITAL MEDICATIONS:  MEDICATIONS  (STANDING):  acetaminophen     Tablet .. 500 milliGRAM(s) Oral every 6 hours  albuterol/ipratropium for Nebulization 3 milliLiter(s) Nebulizer every 6 hours  amLODIPine   Tablet 5 milliGRAM(s) Oral daily  bisacodyl 5 milliGRAM(s) Oral at bedtime  budesonide 160 MICROgram(s)/formoterol 4.5 MICROgram(s) Inhaler 2 Puff(s) Inhalation two times a day  colistimethate for Nebulization 150 milliGRAM(s) CBA Nebulizer every 12 hours  dextrose 5%. 1000 milliLiter(s) (50 mL/Hr) IV Continuous <Continuous>  dextrose 5%. 1000 milliLiter(s) (100 mL/Hr) IV Continuous <Continuous>  dextrose 50% Injectable 25 Gram(s) IV Push once  dextrose 50% Injectable 12.5 Gram(s) IV Push once  dextrose 50% Injectable 25 Gram(s) IV Push once  enoxaparin Injectable 30 milliGRAM(s) SubCutaneous every 24 hours  ferrous    sulfate 325 milliGRAM(s) Oral daily  fluticasone propionate 50 MICROgram(s)/spray Nasal Spray 1 Spray(s) Both Nostrils two times a day  glucagon  Injectable 1 milliGRAM(s) IntraMuscular once  influenza  Vaccine (HIGH DOSE) 0.7 milliLiter(s) IntraMuscular once  insulin glargine Injectable (LANTUS) 6 Unit(s) SubCutaneous at bedtime  insulin lispro (ADMELOG) corrective regimen sliding scale   SubCutaneous three times a day before meals  insulin lispro (ADMELOG) corrective regimen sliding scale   SubCutaneous at bedtime  insulin lispro Injectable (ADMELOG) 4 Unit(s) SubCutaneous three times a day before meals  lidocaine/prilocaine Cream 1 Application(s) Topical daily  metoprolol succinate ER 50 milliGRAM(s) Oral daily  montelukast 10 milliGRAM(s) Oral daily  multivitamin 1 Tablet(s) Oral daily  nystatin    Suspension 240153 Unit(s) Oral daily  pantoprazole    Tablet 40 milliGRAM(s) Oral before breakfast  piperacillin/tazobactam IVPB.. 3.375 Gram(s) IV Intermittent every 8 hours  polyethylene glycol 3350 17 Gram(s) Oral two times a day  predniSONE   Tablet 20 milliGRAM(s) Oral daily  tiotropium 18 MICROgram(s) Capsule 1 Capsule(s) Inhalation daily  venlafaxine XR. 37.5 milliGRAM(s) Oral daily    MEDICATIONS  (PRN):  ALBUTerol    90 MICROgram(s) HFA Inhaler 2 Puff(s) Inhalation every 6 hours PRN Shortness of Breath and/or Wheezing  dextrose Oral Gel 15 Gram(s) Oral once PRN Blood Glucose LESS THAN 70 milliGRAM(s)/deciliter  simethicone 80 milliGRAM(s) Chew four times a day PRN Gas Pain  sodium chloride 3%  Inhalation 4 milliLiter(s) Inhalation every 8 hours PRN with aerobica/airway clearance      LABS:    The Labs were reviewed by me   The Radiology was reviewed by me    EKG tracing reviewed by me    10-04    131<L>  |  91<L>  |  15  ----------------------------<  244<H>  4.8   |  31  |  0.33<L>  10-03    135  |  94<L>  |  6<L>  ----------------------------<  213<H>  4.5   |  34<H>  |  0.28<L>  10-02    133<L>  |  93<L>  |  6<L>  ----------------------------<  196<H>  4.3   |  33<H>  |  0.27<L>    Ca    9.9      04 Oct 2022 07:24  Ca    9.5      03 Oct 2022 07:46  Ca    9.3      02 Oct 2022 07:08  Phos  3.3     10-04  Mg     2.10     10-04    TPro  6.9  /  Alb  3.2<L>  /  TBili  0.2  /  DBili  x   /  AST  34<H>  /  ALT  33  /  AlkPhos  209<H>  10-04  TPro  6.5  /  Alb  3.1<L>  /  TBili  0.2  /  DBili  x   /  AST  31  /  ALT  34<H>  /  AlkPhos  199<H>  10-03  TPro  6.3  /  Alb  3.3  /  TBili  0.3  /  DBili  x   /  AST  28  /  ALT  30  /  AlkPhos  172<H>  10-02    Magnesium, Serum: 2.10 mg/dL (10-04-22 @ 07:24)  Magnesium, Serum: 2.00 mg/dL (10-03-22 @ 07:46)  Magnesium, Serum: 1.80 mg/dL (10-02-22 @ 07:08)    Phosphorus Level, Serum: 3.3 mg/dL (10-04-22 @ 07:24)  Phosphorus Level, Serum: 3.0 mg/dL (10-03-22 @ 07:46)  Phosphorus Level, Serum: 2.1 mg/dL (10-02-22 @ 07:08)                                              13.4   10.32 )-----------( 168      ( 04 Oct 2022 07:24 )             42.5                         13.1   9.35  )-----------( 199      ( 03 Oct 2022 07:46 )             40.3                         13.1   11.53 )-----------( 168      ( 02 Oct 2022 07:08 )             40.8     CAPILLARY BLOOD GLUCOSE      POCT Blood Glucose.: 225 mg/dL (04 Oct 2022 07:09)  POCT Blood Glucose.: 222 mg/dL (03 Oct 2022 21:05)  POCT Blood Glucose.: 227 mg/dL (03 Oct 2022 16:56)  POCT Blood Glucose.: 224 mg/dL (03 Oct 2022 11:57)        MICROBIOLOGY:     RADIOLOGY:  [ ] Reviewed and interpreted by me    Point of Care Ultrasound Findings:    PFT:    EKG:

## 2022-10-04 NOTE — PROGRESS NOTE ADULT - ASSESSMENT
67F w/ Primary Ciliary Dyskinesia, Chronic hyoxemic/hypercapneic Respiratory Failure on 2-3L Home O2 NC secondary to Bronchiectasis on AVAPS, Cirrhosis, Prior CBD stricture s/p dilation, stent placement and removal, HCV Ab+, presented to the ED for abdominal pain found to have punctate CBD stones. Pulmonary consulted for co-management of bronchiectasis. RVP negative. Pt follows with Dr. Young as outpt.     #Bronchiectasis Exacerbation  #Chronic hypoxemic and hypercapnic respiratory Failure    Multiple sputum cultures in the past with pseudomonas aeruginosa (sensitive to geoff and zosyn in 8/23/2022), klebsiella and elizabethkingia meningoseptica.  She has tried both inhaled amikacin and tobramycin in the past and was unable to tolerate it due to nausea and tremulousness.   Outpt workup for cystic fibrosis negative.   Aspergillus igg negative in the past.  sputum culture growing PsA    Recommendations:  c/w prednisone 20mg qd 10/3  Continue home inhalers - albuterol, spiriva, symbicort  C/w home airway clearance regimen: hypersal, aerobica, chest vest  Chest PT  ID Consult for abx co-management: switched back to zosyn, + c/w inhaled colisitin  c/w home O2   c/w NIPPV qhs at her home settings. Last settings documented at [facility]:  AVAPS RR 18, EPAP 5, Min IP 15, Max IP 25,   discussed with Dr. Uriostegui and primary team

## 2022-10-04 NOTE — CHART NOTE - NSCHARTNOTEFT_GEN_A_CORE
Pt seen for severe malnutrition follow up     Medical Course: 67 year old female with PMH primary ciliary dyskinesia (PCD) and sequelae, including chronic hypoxemic hypercapnic respiratory failure (on home O2 at 2-3L NC and AVAPS at night), recurrent admissions for bronchiectasis/COPD exacerbations and pneumonia, MDR Pseudomonas colonization, constipation, cirrhosis, T2DM on insulin, GERD; anxiety, depression, PSH remote cholecystectomy c/b strictures with remote PTC/PTBD/stent, presenting with 2-3 weeks of diffuse abdominal pain worst in epigastric region, found to also have bronchiectasis exacerbation possibly 2/2 infection, now on Zosyn.    Nutrition Course: Patient is [place] speaking,  services offered, preferred to call daughter and have her translate. Patient reports poor intake. When she lays down she feels like her stomach is empty but when she sits up to eat, she feels gassy and bloated and then no longer feels like eating. Patient has simethicone ordered prn for gas. Recommended a low fiber diet temporarily to help alleviate some gas. Per last RD note patient is receiving GERD diet and pantoprazole for GERD. No complaints of nausea, vomiting, diarrhea, or constipation. Last BM 10/3 per RN flowsheets. Denies any chewing or swallowing difficulties at this time. Pateint requesting prune juice.    Diet Prescription: Diet, Consistent Carbohydrate/No Snacks:   Gastroesophageal Reflux Disease (GERD)  DASH/TLC {Sodium & Cholesterol Restricted} (DASH)  No Beef  No Pork  Supplement Feeding Modality:  Oral  Glucerna Shake Cans or Servings Per Day:  2       Frequency:  Daily (09-30-22 @ 08:27)    Pertinent Medications: MEDICATIONS  (STANDING):  acetaminophen     Tablet .. 500 milliGRAM(s) Oral every 6 hours  albuterol/ipratropium for Nebulization 3 milliLiter(s) Nebulizer every 6 hours  amLODIPine   Tablet 5 milliGRAM(s) Oral daily  bisacodyl 5 milliGRAM(s) Oral at bedtime  budesonide 160 MICROgram(s)/formoterol 4.5 MICROgram(s) Inhaler 2 Puff(s) Inhalation two times a day  colistimethate for Nebulization 150 milliGRAM(s) CBA Nebulizer every 12 hours  dextrose 5%. 1000 milliLiter(s) (50 mL/Hr) IV Continuous <Continuous>  dextrose 5%. 1000 milliLiter(s) (100 mL/Hr) IV Continuous <Continuous>  dextrose 50% Injectable 25 Gram(s) IV Push once  dextrose 50% Injectable 12.5 Gram(s) IV Push once  dextrose 50% Injectable 25 Gram(s) IV Push once  enoxaparin Injectable 30 milliGRAM(s) SubCutaneous every 24 hours  ferrous    sulfate 325 milliGRAM(s) Oral daily  fluticasone propionate 50 MICROgram(s)/spray Nasal Spray 1 Spray(s) Both Nostrils two times a day  glucagon  Injectable 1 milliGRAM(s) IntraMuscular once  influenza  Vaccine (HIGH DOSE) 0.7 milliLiter(s) IntraMuscular once  insulin glargine Injectable (LANTUS) 8 Unit(s) SubCutaneous at bedtime  insulin lispro (ADMELOG) corrective regimen sliding scale   SubCutaneous three times a day before meals  insulin lispro (ADMELOG) corrective regimen sliding scale   SubCutaneous at bedtime  insulin lispro Injectable (ADMELOG) 6 Unit(s) SubCutaneous three times a day before meals  lidocaine/prilocaine Cream 1 Application(s) Topical daily  metoprolol succinate ER 50 milliGRAM(s) Oral daily  montelukast 10 milliGRAM(s) Oral daily  multivitamin 1 Tablet(s) Oral daily  nystatin    Suspension 319573 Unit(s) Oral daily  pantoprazole    Tablet 40 milliGRAM(s) Oral before breakfast  piperacillin/tazobactam IVPB.. 3.375 Gram(s) IV Intermittent every 8 hours  polyethylene glycol 3350 17 Gram(s) Oral two times a day  predniSONE   Tablet 20 milliGRAM(s) Oral daily  tiotropium 18 MICROgram(s) Capsule 1 Capsule(s) Inhalation daily  venlafaxine XR. 37.5 milliGRAM(s) Oral daily    MEDICATIONS  (PRN):  ALBUTerol    90 MICROgram(s) HFA Inhaler 2 Puff(s) Inhalation every 6 hours PRN Shortness of Breath and/or Wheezing  dextrose Oral Gel 15 Gram(s) Oral once PRN Blood Glucose LESS THAN 70 milliGRAM(s)/deciliter  simethicone 80 milliGRAM(s) Chew four times a day PRN Gas Pain  sodium chloride 3%  Inhalation 4 milliLiter(s) Inhalation every 8 hours PRN with aerobica/airway clearance    Pertinent Labs: 10-04 Na131 mmol/L<L> Glu 244 mg/dL<H> K+ 4.8 mmol/L Cr  0.33 mg/dL<L> BUN 15 mg/dL 10-04 Phos 3.3 mg/dL 10-04 Alb 3.2 g/dL<L> 10-01 Chol 150 mg/dL LDL --    HDL 68 mg/dL Trig 110 mg/dL     CAPILLARY BLOOD GLUCOSE      POCT Blood Glucose.: 378 mg/dL (04 Oct 2022 12:04)  POCT Blood Glucose.: 225 mg/dL (04 Oct 2022 07:09)  POCT Blood Glucose.: 222 mg/dL (03 Oct 2022 21:05)  POCT Blood Glucose.: 227 mg/dL (03 Oct 2022 16:56)      Weight: Height (cm): 162.6 (09-28 @ 17:15)  Weight (kg): 45.9 (09-28 @ 17:15)  BMI (kg/m2): 17.4 (09-28 @ 17:15)  Weight Assessment:      Physical Assessment, per flowsheets:  Edema:  Skin:      Estimated Needs:   [X] No change since previous assessment, based on dosing weight  lbs / kg   kcal daily @ kcal/kg,  gm protein daily @ gm/kg   [ ] recalculated, with consideration for, based on weight    Previous Nutrition Diagnosis:   [ ] Inadequate Energy Intake [ ]Inadequate Oral Intake [ ] Excessive Energy Intake   [ ] Underweight [ ] Increased Nutrient Needs [ ] Overweight/Obesity   [ ] Altered GI Function [ ] Unintended Weight Loss [ ] Food & Nutrition Related Knowledge Deficit [ ] Malnutrition   Nutrition Diagnosis is [ ] ongoing  [ ] resolved [ ] not applicable   New Nutrition Diagnosis: [ ] not applicable     Interventions:   1)   2)  3)     Monitor & Evaluate:  PO intake, tolerance to diet/supplement, nutrition related lab values, weight trends, BMs/GI distress, hydration status, skin integrity.    Ellie Pinto MS, RD| [id number] (Also available on TEAMS) Pt seen for severe malnutrition follow up     Medical Course: 67 year old female with PMH primary ciliary dyskinesia (PCD) and sequelae, including chronic hypoxemic hypercapnic respiratory failure (on home O2 at 2-3L NC and AVAPS at night), recurrent admissions for bronchiectasis/COPD exacerbations and pneumonia, MDR Pseudomonas colonization, constipation, cirrhosis, T2DM on insulin, GERD; anxiety, depression, PSH remote cholecystectomy c/b strictures with remote PTC/PTBD/stent, presenting with 2-3 weeks of diffuse abdominal pain worst in epigastric region, found to also have bronchiectasis exacerbation possibly 2/2 infection, now on Zosyn.    Nutrition Course: Patient is [place] speaking,  services offered, preferred to call daughter and have her translate. Patient reports poor intake.  Per RN flowsheets patient consuming 50-75%. When she lays down she feels like her stomach is empty but when she sits up to eat, she feels gassy and bloated and then no longer feels like eating. Patient has simethicone ordered prn for gas, patient on bowel regimen for constipation. May consider low fiber restriction temporarily 2/2 gas and bloating. Per last RD note patient is receiving GERD diet and pantoprazole for acid reflux. No complaints of nausea, vomiting, diarrhea, or constipation. Last BM 10/3 per RN flowsheets. Denies any chewing or swallowing difficulties at this time. Patient requesting prune juice, receiving for breakfast. Will provide hot cereal (cream of rice) as requested. Patient likes Glucerna shakes, encouraged intake of supplement and small frequent meals. Per PA chart note, GI with no further inpatient treatment (no choledocholithiasis in common hepatic duct), recommendations to follow up with outpatient GI. Patient experiencing elevated blood glucose >200, likely steroid induced.     Diet Prescription: Diet, Consistent Carbohydrate/No Snacks:   Gastroesophageal Reflux Disease (GERD)  DASH/TLC {Sodium & Cholesterol Restricted} (DASH)  No Beef  No Pork  Supplement Feeding Modality:  Oral  Glucerna Shake Cans or Servings Per Day:  2       Frequency:  Daily (09-30-22 @ 08:27)    Pertinent Medications: MEDICATIONS  (STANDING):  acetaminophen     Tablet .. 500 milliGRAM(s) Oral every 6 hours  albuterol/ipratropium for Nebulization 3 milliLiter(s) Nebulizer every 6 hours  amLODIPine   Tablet 5 milliGRAM(s) Oral daily  bisacodyl 5 milliGRAM(s) Oral at bedtime  budesonide 160 MICROgram(s)/formoterol 4.5 MICROgram(s) Inhaler 2 Puff(s) Inhalation two times a day  colistimethate for Nebulization 150 milliGRAM(s) CBA Nebulizer every 12 hours  dextrose 5%. 1000 milliLiter(s) (50 mL/Hr) IV Continuous <Continuous>  dextrose 5%. 1000 milliLiter(s) (100 mL/Hr) IV Continuous <Continuous>  dextrose 50% Injectable 25 Gram(s) IV Push once  dextrose 50% Injectable 12.5 Gram(s) IV Push once  dextrose 50% Injectable 25 Gram(s) IV Push once  enoxaparin Injectable 30 milliGRAM(s) SubCutaneous every 24 hours  ferrous    sulfate 325 milliGRAM(s) Oral daily  fluticasone propionate 50 MICROgram(s)/spray Nasal Spray 1 Spray(s) Both Nostrils two times a day  glucagon  Injectable 1 milliGRAM(s) IntraMuscular once  influenza  Vaccine (HIGH DOSE) 0.7 milliLiter(s) IntraMuscular once  insulin glargine Injectable (LANTUS) 8 Unit(s) SubCutaneous at bedtime  insulin lispro (ADMELOG) corrective regimen sliding scale   SubCutaneous three times a day before meals  insulin lispro (ADMELOG) corrective regimen sliding scale   SubCutaneous at bedtime  insulin lispro Injectable (ADMELOG) 6 Unit(s) SubCutaneous three times a day before meals  lidocaine/prilocaine Cream 1 Application(s) Topical daily  metoprolol succinate ER 50 milliGRAM(s) Oral daily  montelukast 10 milliGRAM(s) Oral daily  multivitamin 1 Tablet(s) Oral daily  nystatin    Suspension 147512 Unit(s) Oral daily  pantoprazole    Tablet 40 milliGRAM(s) Oral before breakfast  piperacillin/tazobactam IVPB.. 3.375 Gram(s) IV Intermittent every 8 hours  polyethylene glycol 3350 17 Gram(s) Oral two times a day  predniSONE   Tablet 20 milliGRAM(s) Oral daily  tiotropium 18 MICROgram(s) Capsule 1 Capsule(s) Inhalation daily  venlafaxine XR. 37.5 milliGRAM(s) Oral daily    MEDICATIONS  (PRN):  ALBUTerol    90 MICROgram(s) HFA Inhaler 2 Puff(s) Inhalation every 6 hours PRN Shortness of Breath and/or Wheezing  dextrose Oral Gel 15 Gram(s) Oral once PRN Blood Glucose LESS THAN 70 milliGRAM(s)/deciliter  simethicone 80 milliGRAM(s) Chew four times a day PRN Gas Pain  sodium chloride 3%  Inhalation 4 milliLiter(s) Inhalation every 8 hours PRN with aerobica/airway clearance    Pertinent Labs: 10-04 Na131 mmol/L<L> Glu 244 mg/dL<H> K+ 4.8 mmol/L Cr  0.33 mg/dL<L> BUN 15 mg/dL 10-04 Phos 3.3 mg/dL 10-04 Alb 3.2 g/dL<L> 10-01 Chol 150 mg/dL LDL --    HDL 68 mg/dL Trig 110 mg/dL     CAPILLARY BLOOD GLUCOSE  POCT Blood Glucose.: 378 mg/dL (04 Oct 2022 12:04)  POCT Blood Glucose.: 225 mg/dL (04 Oct 2022 07:09)  POCT Blood Glucose.: 222 mg/dL (03 Oct 2022 21:05)  POCT Blood Glucose.: 227 mg/dL (03 Oct 2022 16:56)      Weight: Height (cm): 162.6 (09-28 @ 17:15)  Weight (kg): 45.9 (09-28 @ 17:15)  BMI (kg/m2): 17.4 (09-28 @ 17:15)  Weight Assessment: No new weight to assess      Physical Assessment, per flowsheets:  Edema: No edema noted per RN flowsheets   Skin: Intact w/ no pressure injuries noted per RN flowsheets       Estimated Needs:   [X] No change since previous assessment    Previous Nutrition Diagnosis: severe protein calorie malnutrition    Nutrition Diagnosis is [ x] ongoing  [ ] resolved [ ] not applicable   New Nutrition Diagnosis: [x ] not applicable     Interventions:   1) Recommend add low fiber to diet order temporarily (d/c DASH/TLC).   2)Obtain updated weight  3) Please document % PO intake in RN flowsheets   4) Continue multivitamin and iron supplement.  5) RD available prn.    Monitor & Evaluate:  PO intake, tolerance to diet/supplement, nutrition related lab values, weight trends, BMs/GI distress, hydration status, skin integrity.    Ellie Pinto MS, RD| [id number] (Also available on TEAMS)

## 2022-10-04 NOTE — PROGRESS NOTE ADULT - PROBLEM SELECTOR PLAN 4
# possibly bilary colic 2/2 choledocholithiasis 2/2 PCD  - f/u MRCP, no stone in CBD, but a stone in the distal preampullary; contact ed GI re: MRCP results. No intervention as per GI rec.     # analgesia  - Patient seemed to be taking omeprazole for minimal abdominal pain relief but might benefit from acetaminophen, possibly avoid opiates to avoid biliary colic, possibly avoid NSAID to avoid mucosal irritation/bleed (hx GERD)  - acetaminophen 500 po q6h standing, Toradol 15 q8 PRN for pain    # weight loss - likely due to poor intake for pain avoidance, though malignancy possible  - diet as per nutrition, multivitamin (has home iron), weekly weights

## 2022-10-05 LAB
ALBUMIN SERPL ELPH-MCNC: 3.2 G/DL — LOW (ref 3.3–5)
ALP SERPL-CCNC: 194 U/L — HIGH (ref 40–120)
ALT FLD-CCNC: 37 U/L — HIGH (ref 4–33)
ANION GAP SERPL CALC-SCNC: 11 MMOL/L — SIGNIFICANT CHANGE UP (ref 7–14)
AST SERPL-CCNC: 41 U/L — HIGH (ref 4–32)
BASOPHILS # BLD AUTO: 0.08 K/UL — SIGNIFICANT CHANGE UP (ref 0–0.2)
BASOPHILS NFR BLD AUTO: 0.8 % — SIGNIFICANT CHANGE UP (ref 0–2)
BILIRUB SERPL-MCNC: <0.2 MG/DL — SIGNIFICANT CHANGE UP (ref 0.2–1.2)
BUN SERPL-MCNC: 16 MG/DL — SIGNIFICANT CHANGE UP (ref 7–23)
CALCIUM SERPL-MCNC: 9.4 MG/DL — SIGNIFICANT CHANGE UP (ref 8.4–10.5)
CHLORIDE SERPL-SCNC: 96 MMOL/L — LOW (ref 98–107)
CO2 SERPL-SCNC: 31 MMOL/L — SIGNIFICANT CHANGE UP (ref 22–31)
CREAT SERPL-MCNC: 0.33 MG/DL — LOW (ref 0.5–1.3)
EGFR: 115 ML/MIN/1.73M2 — SIGNIFICANT CHANGE UP
EOSINOPHIL # BLD AUTO: 0.15 K/UL — SIGNIFICANT CHANGE UP (ref 0–0.5)
EOSINOPHIL NFR BLD AUTO: 1.5 % — SIGNIFICANT CHANGE UP (ref 0–6)
GLUCOSE BLDC GLUCOMTR-MCNC: 140 MG/DL — HIGH (ref 70–99)
GLUCOSE BLDC GLUCOMTR-MCNC: 177 MG/DL — HIGH (ref 70–99)
GLUCOSE BLDC GLUCOMTR-MCNC: 218 MG/DL — HIGH (ref 70–99)
GLUCOSE BLDC GLUCOMTR-MCNC: 267 MG/DL — HIGH (ref 70–99)
GLUCOSE BLDC GLUCOMTR-MCNC: 373 MG/DL — HIGH (ref 70–99)
GLUCOSE SERPL-MCNC: 148 MG/DL — HIGH (ref 70–99)
HCT VFR BLD CALC: 38 % — SIGNIFICANT CHANGE UP (ref 34.5–45)
HGB BLD-MCNC: 12.1 G/DL — SIGNIFICANT CHANGE UP (ref 11.5–15.5)
IANC: 5.51 K/UL — SIGNIFICANT CHANGE UP (ref 1.8–7.4)
IMM GRANULOCYTES NFR BLD AUTO: 1.3 % — HIGH (ref 0–0.9)
LYMPHOCYTES # BLD AUTO: 2.92 K/UL — SIGNIFICANT CHANGE UP (ref 1–3.3)
LYMPHOCYTES # BLD AUTO: 30.1 % — SIGNIFICANT CHANGE UP (ref 13–44)
MAGNESIUM SERPL-MCNC: 1.9 MG/DL — SIGNIFICANT CHANGE UP (ref 1.6–2.6)
MCHC RBC-ENTMCNC: 27.6 PG — SIGNIFICANT CHANGE UP (ref 27–34)
MCHC RBC-ENTMCNC: 31.8 GM/DL — LOW (ref 32–36)
MCV RBC AUTO: 86.8 FL — SIGNIFICANT CHANGE UP (ref 80–100)
MONOCYTES # BLD AUTO: 0.91 K/UL — HIGH (ref 0–0.9)
MONOCYTES NFR BLD AUTO: 9.4 % — SIGNIFICANT CHANGE UP (ref 2–14)
NEUTROPHILS # BLD AUTO: 5.51 K/UL — SIGNIFICANT CHANGE UP (ref 1.8–7.4)
NEUTROPHILS NFR BLD AUTO: 56.9 % — SIGNIFICANT CHANGE UP (ref 43–77)
NRBC # BLD: 0 /100 WBCS — SIGNIFICANT CHANGE UP (ref 0–0)
NRBC # FLD: 0 K/UL — SIGNIFICANT CHANGE UP (ref 0–0)
PHOSPHATE SERPL-MCNC: 3.1 MG/DL — SIGNIFICANT CHANGE UP (ref 2.5–4.5)
PLATELET # BLD AUTO: 222 K/UL — SIGNIFICANT CHANGE UP (ref 150–400)
POTASSIUM SERPL-MCNC: 3.5 MMOL/L — SIGNIFICANT CHANGE UP (ref 3.5–5.3)
POTASSIUM SERPL-SCNC: 3.5 MMOL/L — SIGNIFICANT CHANGE UP (ref 3.5–5.3)
PROT SERPL-MCNC: 6.2 G/DL — SIGNIFICANT CHANGE UP (ref 6–8.3)
RBC # BLD: 4.38 M/UL — SIGNIFICANT CHANGE UP (ref 3.8–5.2)
RBC # FLD: 13.4 % — SIGNIFICANT CHANGE UP (ref 10.3–14.5)
SARS-COV-2 RNA SPEC QL NAA+PROBE: SIGNIFICANT CHANGE UP
SODIUM SERPL-SCNC: 138 MMOL/L — SIGNIFICANT CHANGE UP (ref 135–145)
WBC # BLD: 9.7 K/UL — SIGNIFICANT CHANGE UP (ref 3.8–10.5)
WBC # FLD AUTO: 9.7 K/UL — SIGNIFICANT CHANGE UP (ref 3.8–10.5)

## 2022-10-05 PROCEDURE — 99232 SBSQ HOSP IP/OBS MODERATE 35: CPT

## 2022-10-05 PROCEDURE — 99233 SBSQ HOSP IP/OBS HIGH 50: CPT

## 2022-10-05 RX ORDER — INSULIN LISPRO 100/ML
4 VIAL (ML) SUBCUTANEOUS ONCE
Refills: 0 | Status: COMPLETED | OUTPATIENT
Start: 2022-10-05 | End: 2022-10-05

## 2022-10-05 RX ORDER — CALAMINE AND ZINC OXIDE AND PHENOL 160; 10 MG/ML; MG/ML
1 LOTION TOPICAL ONCE
Refills: 0 | Status: COMPLETED | OUTPATIENT
Start: 2022-10-05 | End: 2022-10-05

## 2022-10-05 RX ORDER — INSULIN LISPRO 100/ML
8 VIAL (ML) SUBCUTANEOUS
Refills: 0 | Status: DISCONTINUED | OUTPATIENT
Start: 2022-10-05 | End: 2022-10-07

## 2022-10-05 RX ORDER — DIPHENHYDRAMINE HCL 50 MG
25 CAPSULE ORAL ONCE
Refills: 0 | Status: DISCONTINUED | OUTPATIENT
Start: 2022-10-05 | End: 2022-10-05

## 2022-10-05 RX ORDER — INSULIN GLARGINE 100 [IU]/ML
11 INJECTION, SOLUTION SUBCUTANEOUS AT BEDTIME
Refills: 0 | Status: DISCONTINUED | OUTPATIENT
Start: 2022-10-05 | End: 2022-10-10

## 2022-10-05 RX ADMIN — PANTOPRAZOLE SODIUM 40 MILLIGRAM(S): 20 TABLET, DELAYED RELEASE ORAL at 07:13

## 2022-10-05 RX ADMIN — POLYETHYLENE GLYCOL 3350 17 GRAM(S): 17 POWDER, FOR SOLUTION ORAL at 17:18

## 2022-10-05 RX ADMIN — Medication 50 MILLIGRAM(S): at 07:13

## 2022-10-05 RX ADMIN — Medication 325 MILLIGRAM(S): at 11:59

## 2022-10-05 RX ADMIN — Medication 4 UNIT(S): at 18:28

## 2022-10-05 RX ADMIN — Medication 3 MILLILITER(S): at 09:30

## 2022-10-05 RX ADMIN — Medication 500 MILLIGRAM(S): at 18:01

## 2022-10-05 RX ADMIN — Medication 500 MILLIGRAM(S): at 07:13

## 2022-10-05 RX ADMIN — Medication 37.5 MILLIGRAM(S): at 11:59

## 2022-10-05 RX ADMIN — Medication 1 SPRAY(S): at 07:15

## 2022-10-05 RX ADMIN — PIPERACILLIN AND TAZOBACTAM 25 GRAM(S): 4; .5 INJECTION, POWDER, LYOPHILIZED, FOR SOLUTION INTRAVENOUS at 15:46

## 2022-10-05 RX ADMIN — Medication 500 MILLIGRAM(S): at 17:13

## 2022-10-05 RX ADMIN — Medication 8 UNIT(S): at 17:25

## 2022-10-05 RX ADMIN — Medication 1 TABLET(S): at 11:59

## 2022-10-05 RX ADMIN — BUDESONIDE AND FORMOTEROL FUMARATE DIHYDRATE 2 PUFF(S): 160; 4.5 AEROSOL RESPIRATORY (INHALATION) at 22:20

## 2022-10-05 RX ADMIN — Medication 500 MILLIGRAM(S): at 12:34

## 2022-10-05 RX ADMIN — Medication 20 MILLIGRAM(S): at 07:13

## 2022-10-05 RX ADMIN — Medication 6 UNIT(S): at 07:49

## 2022-10-05 RX ADMIN — MONTELUKAST 10 MILLIGRAM(S): 4 TABLET, CHEWABLE ORAL at 11:59

## 2022-10-05 RX ADMIN — SIMETHICONE 80 MILLIGRAM(S): 80 TABLET, CHEWABLE ORAL at 12:03

## 2022-10-05 RX ADMIN — BUDESONIDE AND FORMOTEROL FUMARATE DIHYDRATE 2 PUFF(S): 160; 4.5 AEROSOL RESPIRATORY (INHALATION) at 08:54

## 2022-10-05 RX ADMIN — COLISTIMETHATE SODIUM 150 MILLIGRAM(S) CBA: 150 INJECTION INTRAMUSCULAR; INTRAVENOUS at 09:34

## 2022-10-05 RX ADMIN — PIPERACILLIN AND TAZOBACTAM 25 GRAM(S): 4; .5 INJECTION, POWDER, LYOPHILIZED, FOR SOLUTION INTRAVENOUS at 07:16

## 2022-10-05 RX ADMIN — Medication 500 MILLIGRAM(S): at 11:58

## 2022-10-05 RX ADMIN — LIDOCAINE AND PRILOCAINE CREAM 1 APPLICATION(S): 25; 25 CREAM TOPICAL at 17:06

## 2022-10-05 RX ADMIN — Medication 6: at 07:50

## 2022-10-05 RX ADMIN — Medication 1 SPRAY(S): at 17:13

## 2022-10-05 RX ADMIN — Medication 500000 UNIT(S): at 11:58

## 2022-10-05 RX ADMIN — AMLODIPINE BESYLATE 5 MILLIGRAM(S): 2.5 TABLET ORAL at 07:13

## 2022-10-05 RX ADMIN — TIOTROPIUM BROMIDE 1 CAPSULE(S): 18 CAPSULE ORAL; RESPIRATORY (INHALATION) at 09:05

## 2022-10-05 RX ADMIN — PIPERACILLIN AND TAZOBACTAM 25 GRAM(S): 4; .5 INJECTION, POWDER, LYOPHILIZED, FOR SOLUTION INTRAVENOUS at 22:17

## 2022-10-05 RX ADMIN — Medication 3 MILLILITER(S): at 05:19

## 2022-10-05 RX ADMIN — Medication 5 MILLIGRAM(S): at 22:19

## 2022-10-05 RX ADMIN — CALAMINE AND ZINC OXIDE AND PHENOL 1 APPLICATION(S): 160; 10 LOTION TOPICAL at 07:14

## 2022-10-05 RX ADMIN — ENOXAPARIN SODIUM 30 MILLIGRAM(S): 100 INJECTION SUBCUTANEOUS at 15:47

## 2022-10-05 RX ADMIN — Medication 6 UNIT(S): at 12:34

## 2022-10-05 RX ADMIN — Medication 3 MILLILITER(S): at 16:02

## 2022-10-05 RX ADMIN — SIMETHICONE 80 MILLIGRAM(S): 80 TABLET, CHEWABLE ORAL at 18:27

## 2022-10-05 RX ADMIN — Medication 3 MILLILITER(S): at 21:21

## 2022-10-05 RX ADMIN — INSULIN GLARGINE 11 UNIT(S): 100 INJECTION, SOLUTION SUBCUTANEOUS at 22:42

## 2022-10-05 NOTE — PROGRESS NOTE ADULT - PROBLEM SELECTOR PLAN 1
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 Last settings documented at [facility]:  AVAPS RR 18, EPAP 5, Min IP 15, Max IP 25,   - duonebs q6h  -Started Prednisone as per Pulm   - Zosyn+ inhaled Colistin per ID recs  - transfer to [facility] for specialized care when bed becomes available  - appreciate pulm and ID recs

## 2022-10-05 NOTE — PROGRESS NOTE ADULT - SUBJECTIVE AND OBJECTIVE BOX
Patient is a 67y old  Female who presents with a chief complaint of abdominal pain (04 Oct 2022 15:39)      SUBJECTIVE / OVERNIGHT EVENTS: Pt seen and examined with LION Doty. No new complaints. (+) BM yesterday and today.     MEDICATIONS  (STANDING):  acetaminophen     Tablet .. 500 milliGRAM(s) Oral every 6 hours  albuterol/ipratropium for Nebulization 3 milliLiter(s) Nebulizer every 6 hours  amLODIPine   Tablet 5 milliGRAM(s) Oral daily  bisacodyl 5 milliGRAM(s) Oral at bedtime  budesonide 160 MICROgram(s)/formoterol 4.5 MICROgram(s) Inhaler 2 Puff(s) Inhalation two times a day  colistimethate for Nebulization 150 milliGRAM(s) CBA Nebulizer every 12 hours  dextrose 5%. 1000 milliLiter(s) (100 mL/Hr) IV Continuous <Continuous>  dextrose 5%. 1000 milliLiter(s) (50 mL/Hr) IV Continuous <Continuous>  dextrose 50% Injectable 25 Gram(s) IV Push once  dextrose 50% Injectable 12.5 Gram(s) IV Push once  dextrose 50% Injectable 25 Gram(s) IV Push once  enoxaparin Injectable 30 milliGRAM(s) SubCutaneous every 24 hours  ferrous    sulfate 325 milliGRAM(s) Oral daily  fluticasone propionate 50 MICROgram(s)/spray Nasal Spray 1 Spray(s) Both Nostrils two times a day  glucagon  Injectable 1 milliGRAM(s) IntraMuscular once  influenza  Vaccine (HIGH DOSE) 0.7 milliLiter(s) IntraMuscular once  insulin glargine Injectable (LANTUS) 8 Unit(s) SubCutaneous at bedtime  insulin lispro (ADMELOG) corrective regimen sliding scale   SubCutaneous three times a day before meals  insulin lispro (ADMELOG) corrective regimen sliding scale   SubCutaneous at bedtime  insulin lispro Injectable (ADMELOG) 6 Unit(s) SubCutaneous three times a day before meals  lidocaine/prilocaine Cream 1 Application(s) Topical daily  metoprolol succinate ER 50 milliGRAM(s) Oral daily  montelukast 10 milliGRAM(s) Oral daily  multivitamin 1 Tablet(s) Oral daily  nystatin    Suspension 594754 Unit(s) Oral daily  pantoprazole    Tablet 40 milliGRAM(s) Oral before breakfast  piperacillin/tazobactam IVPB.. 3.375 Gram(s) IV Intermittent every 8 hours  polyethylene glycol 3350 17 Gram(s) Oral two times a day  predniSONE   Tablet 20 milliGRAM(s) Oral daily  tiotropium 18 MICROgram(s) Capsule 1 Capsule(s) Inhalation daily  venlafaxine XR. 37.5 milliGRAM(s) Oral daily    MEDICATIONS  (PRN):  ALBUTerol    90 MICROgram(s) HFA Inhaler 2 Puff(s) Inhalation every 6 hours PRN Shortness of Breath and/or Wheezing  dextrose Oral Gel 15 Gram(s) Oral once PRN Blood Glucose LESS THAN 70 milliGRAM(s)/deciliter  simethicone 80 milliGRAM(s) Chew four times a day PRN Gas Pain  sodium chloride 3%  Inhalation 4 milliLiter(s) Inhalation every 8 hours PRN with aerobica/airway clearance      Vital Signs Last 24 Hrs  T(C): 36.7 (05 Oct 2022 10:37), Max: 36.7 (05 Oct 2022 10:37)  T(F): 98 (05 Oct 2022 10:37), Max: 98 (05 Oct 2022 10:37)  HR: 89 (05 Oct 2022 10:37) (88 - 104)  BP: 139/65 (05 Oct 2022 10:37) (130/55 - 139/65)  BP(mean): --  RR: 18 (05 Oct 2022 10:37) (17 - 18)  SpO2: 99% (05 Oct 2022 10:37) (97% - 99%)    Parameters below as of 05 Oct 2022 10:37  Patient On (Oxygen Delivery Method): nasal cannula      CAPILLARY BLOOD GLUCOSE      POCT Blood Glucose.: 140 mg/dL (05 Oct 2022 12:13)  POCT Blood Glucose.: 267 mg/dL (05 Oct 2022 07:33)  POCT Blood Glucose.: 154 mg/dL (04 Oct 2022 21:12)  POCT Blood Glucose.: 349 mg/dL (04 Oct 2022 17:07)    I&O's Summary    04 Oct 2022 07:01  -  05 Oct 2022 07:00  --------------------------------------------------------  IN: 0 mL / OUT: 1500 mL / NET: -1500 mL        PHYSICAL EXAM:  GENERAL: NAD, well-developed  HEAD:  Atraumatic, Normocephalic  EYES: EOMI, PERRLA, conjunctiva and sclera clear  NECK: Supple, No JVD  CHEST/LUNG: (+) scattered crackles and rhonchi  bilaterally; No wheeze  HEART: Regular rate and rhythm; No murmurs, rubs, or gallops  ABDOMEN: Soft, mildly tender at lower abd, no guarding /rebound, Nondistended; Bowel sounds present  EXTREMITIES:  2+ Peripheral Pulses, No clubbing, cyanosis, or edema  PSYCH: AAOx3  NEUROLOGY: non-focal  SKIN: No rashes or lesions    LABS:                        12.1   9.70  )-----------( 222      ( 05 Oct 2022 06:21 )             38.0     10-05    138  |  96<L>  |  16  ----------------------------<  148<H>  3.5   |  31  |  0.33<L>    Ca    9.4      05 Oct 2022 06:21  Phos  3.1     10-05  Mg     1.90     10-05    TPro  6.2  /  Alb  3.2<L>  /  TBili  <0.2  /  DBili  x   /  AST  41<H>  /  ALT  37<H>  /  AlkPhos  194<H>  10-05              RADIOLOGY & ADDITIONAL TESTS:    Imaging Personally Reviewed:    Consultant(s) Notes Reviewed:      Care Discussed with Consultants/Other Providers:

## 2022-10-05 NOTE — PROGRESS NOTE ADULT - SUBJECTIVE AND OBJECTIVE BOX
Subjective: offers no complaints   no hypoglycemic episodes   tolerating diet     MEDICATIONS  (STANDING):  acetaminophen     Tablet .. 500 milliGRAM(s) Oral every 6 hours  albuterol/ipratropium for Nebulization 3 milliLiter(s) Nebulizer every 6 hours  amLODIPine   Tablet 5 milliGRAM(s) Oral daily  bisacodyl 5 milliGRAM(s) Oral at bedtime  budesonide 160 MICROgram(s)/formoterol 4.5 MICROgram(s) Inhaler 2 Puff(s) Inhalation two times a day  colistimethate for Nebulization 150 milliGRAM(s) CBA Nebulizer every 12 hours  dextrose 5%. 1000 milliLiter(s) (100 mL/Hr) IV Continuous <Continuous>  dextrose 5%. 1000 milliLiter(s) (50 mL/Hr) IV Continuous <Continuous>  dextrose 50% Injectable 25 Gram(s) IV Push once  dextrose 50% Injectable 12.5 Gram(s) IV Push once  dextrose 50% Injectable 25 Gram(s) IV Push once  enoxaparin Injectable 30 milliGRAM(s) SubCutaneous every 24 hours  ferrous    sulfate 325 milliGRAM(s) Oral daily  fluticasone propionate 50 MICROgram(s)/spray Nasal Spray 1 Spray(s) Both Nostrils two times a day  glucagon  Injectable 1 milliGRAM(s) IntraMuscular once  influenza  Vaccine (HIGH DOSE) 0.7 milliLiter(s) IntraMuscular once  insulin glargine Injectable (LANTUS) 8 Unit(s) SubCutaneous at bedtime  insulin lispro (ADMELOG) corrective regimen sliding scale   SubCutaneous three times a day before meals  insulin lispro (ADMELOG) corrective regimen sliding scale   SubCutaneous at bedtime  insulin lispro Injectable (ADMELOG) 6 Unit(s) SubCutaneous three times a day before meals  lidocaine/prilocaine Cream 1 Application(s) Topical daily  metoprolol succinate ER 50 milliGRAM(s) Oral daily  montelukast 10 milliGRAM(s) Oral daily  multivitamin 1 Tablet(s) Oral daily  nystatin    Suspension 827865 Unit(s) Oral daily  pantoprazole    Tablet 40 milliGRAM(s) Oral before breakfast  piperacillin/tazobactam IVPB.. 3.375 Gram(s) IV Intermittent every 8 hours  polyethylene glycol 3350 17 Gram(s) Oral two times a day  predniSONE   Tablet 20 milliGRAM(s) Oral daily  tiotropium 18 MICROgram(s) Capsule 1 Capsule(s) Inhalation daily  venlafaxine XR. 37.5 milliGRAM(s) Oral daily    MEDICATIONS  (PRN):  ALBUTerol    90 MICROgram(s) HFA Inhaler 2 Puff(s) Inhalation every 6 hours PRN Shortness of Breath and/or Wheezing  dextrose Oral Gel 15 Gram(s) Oral once PRN Blood Glucose LESS THAN 70 milliGRAM(s)/deciliter  simethicone 80 milliGRAM(s) Chew four times a day PRN Gas Pain  sodium chloride 3%  Inhalation 4 milliLiter(s) Inhalation every 8 hours PRN with aerobica/airway clearance      PHYSICAL EXAM:  VITALS: T(C): 36.7 (10-05-22 @ 10:37)  T(F): 98 (10-05-22 @ 10:37), Max: 98 (10-05-22 @ 10:37)  HR: 89 (10-05-22 @ 10:37) (88 - 96)  BP: 139/65 (10-05-22 @ 10:37) (130/55 - 139/65)  RR:  (18 - 18)  SpO2:  (97% - 99%)  Wt(kg): --  GENERAL: NAD, well-groomed, well-developed  EYES: No proptosis, no injection  HEENT:  Atraumatic, Normocephalic, moist mucous membranes  THYROID: Normal size, no palpable nodules  RESPIRATORY: Clear to auscultation bilaterally; No rales, rhonchi, wheezing, or rubs  CARDIOVASCULAR: Regular rate and rhythm; No murmurs; no peripheral edema  GI: Soft, nontender, non distended, normal bowel sounds  CUSHING'S SIGNS: no striae    POCT Blood Glucose.: 140 mg/dL (10-05-22 @ 12:13)  POCT Blood Glucose.: 267 mg/dL (10-05-22 @ 07:33)  POCT Blood Glucose.: 154 mg/dL (10-04-22 @ 21:12)  POCT Blood Glucose.: 349 mg/dL (10-04-22 @ 17:07)  POCT Blood Glucose.: 378 mg/dL (10-04-22 @ 12:04)  POCT Blood Glucose.: 225 mg/dL (10-04-22 @ 07:09)  POCT Blood Glucose.: 222 mg/dL (10-03-22 @ 21:05)  POCT Blood Glucose.: 227 mg/dL (10-03-22 @ 16:56)  POCT Blood Glucose.: 224 mg/dL (10-03-22 @ 11:57)  POCT Blood Glucose.: 200 mg/dL (10-03-22 @ 07:36)  POCT Blood Glucose.: 112 mg/dL (10-02-22 @ 22:38)  POCT Blood Glucose.: 248 mg/dL (10-02-22 @ 16:52)    10-05    138  |  96<L>  |  16  ----------------------------<  148<H>  3.5   |  31  |  0.33<L>    eGFR: 115    Ca    9.4      10-05  Mg     1.90     10-05  Phos  3.1     10-05    TPro  6.2  /  Alb  3.2<L>  /  TBili  <0.2  /  DBili  x   /  AST  41<H>  /  ALT  37<H>  /  AlkPhos  194<H>  10-05      Thyroid Function Tests:

## 2022-10-05 NOTE — CHART NOTE - NSCHARTNOTEFT_GEN_A_CORE
POCT Blood Glucose.: 373 mg/dL (10.05.22 @ 17:20)  Informed by RN patient agreeable to standing 8 units of insulin, however refused additional Admelog 10 units per sliding scale  Went to see patient at bedside,  at bedside as well  Patient educated on hyperglycemia and diabetes and its risks including but not limited to DKA and cardiovascular/renal effects  Patient verbalized understanding but states she is nervous about episodes of hypoglycemia. Patient A&O x 3 and able to make own decisions.  Patient continues to refuse insulin per sliding scale but is agreeable to take additional Admelog 4 units now  RN made aware   ACP team to follow up bedtime glucose reading  Team to continue to monitor

## 2022-10-05 NOTE — PROGRESS NOTE ADULT - ASSESSMENT
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  Endocrinology consulted for assistance with management of type 2 diabetes.    1. Type 2 Diabetes Mellitus, poorly controlled  HbA1c: 9.4%  Home Regimen: Lantus 15u HS, Lispro 5u AC  Suspect that her BG fluctuate at home with varying doses of steroids    While inpatient:  BG target 140-180 mg/dl  Increase Lantus to 11 units SQ qHS  Increase Admelog to 8 units SQ TID before meals (Hold if NPO/skips meal)   Increase Admelog correctional scale to moderate dose before meals (while on steroids), low dose at bedtime  Check BG before meals and bedtime  Consistent carbohydrate diet  Hypoglycemia protocol     Discharge Plan:  Basal/bolus insulin, doses to be determined.   Can consider using correctional scale to assist with better glucose control at home (per daughter, they are estimating, but no structured correctional scale)  Ensure patient has glucometer, glucose test strips, lancets, alcohol swabs and insulin PEN needles  Followup with Medicine Specialties at [place], Endocrine Clinic: [id number] [facility] [address], [phone]    2. Steroid induced hyperglycemia  Patient started on Prednisone 20 mg daily by pulmonology 10/3/22  Per primary team, no immediate plans for changing dose of Prednisone or tapering, it will be slowly tapered outpatient  Discussed with patient/family the effect of steroids on blood glucose (insulin will need to be adjusted with adjustment of steroid dose), they verbalize understanding    3. Hypertension  BP goal <130/80  Close to target on Metoprolol and Amlodipine   Further management as per primary team    4. Hyperlipidemia  LDL target less than 70  LDL resulted 60, not on statin   Ok to defer statin for now but would reconsider as outpatient. Patient is in statin benefit group, has type 2 DM and age 40-75

## 2022-10-06 LAB
GLUCOSE BLDC GLUCOMTR-MCNC: 130 MG/DL — HIGH (ref 70–99)
GLUCOSE BLDC GLUCOMTR-MCNC: 159 MG/DL — HIGH (ref 70–99)
GLUCOSE BLDC GLUCOMTR-MCNC: 183 MG/DL — HIGH (ref 70–99)
GLUCOSE BLDC GLUCOMTR-MCNC: 264 MG/DL — HIGH (ref 70–99)
GLUCOSE BLDC GLUCOMTR-MCNC: 350 MG/DL — HIGH (ref 70–99)

## 2022-10-06 PROCEDURE — 99232 SBSQ HOSP IP/OBS MODERATE 35: CPT

## 2022-10-06 PROCEDURE — 99232 SBSQ HOSP IP/OBS MODERATE 35: CPT | Mod: GC

## 2022-10-06 PROCEDURE — 99233 SBSQ HOSP IP/OBS HIGH 50: CPT

## 2022-10-06 RX ADMIN — Medication 1 TABLET(S): at 12:34

## 2022-10-06 RX ADMIN — POLYETHYLENE GLYCOL 3350 17 GRAM(S): 17 POWDER, FOR SOLUTION ORAL at 07:18

## 2022-10-06 RX ADMIN — Medication 8: at 17:29

## 2022-10-06 RX ADMIN — Medication 3 MILLILITER(S): at 20:28

## 2022-10-06 RX ADMIN — Medication 1 DROP(S): at 18:37

## 2022-10-06 RX ADMIN — Medication 500 MILLIGRAM(S): at 12:34

## 2022-10-06 RX ADMIN — INSULIN GLARGINE 11 UNIT(S): 100 INJECTION, SOLUTION SUBCUTANEOUS at 22:07

## 2022-10-06 RX ADMIN — Medication 8 UNIT(S): at 17:30

## 2022-10-06 RX ADMIN — MONTELUKAST 10 MILLIGRAM(S): 4 TABLET, CHEWABLE ORAL at 12:34

## 2022-10-06 RX ADMIN — Medication 325 MILLIGRAM(S): at 12:35

## 2022-10-06 RX ADMIN — Medication 2: at 12:32

## 2022-10-06 RX ADMIN — Medication 500 MILLIGRAM(S): at 18:37

## 2022-10-06 RX ADMIN — Medication 5 MILLIGRAM(S): at 21:33

## 2022-10-06 RX ADMIN — PIPERACILLIN AND TAZOBACTAM 25 GRAM(S): 4; .5 INJECTION, POWDER, LYOPHILIZED, FOR SOLUTION INTRAVENOUS at 13:45

## 2022-10-06 RX ADMIN — AMLODIPINE BESYLATE 5 MILLIGRAM(S): 2.5 TABLET ORAL at 07:17

## 2022-10-06 RX ADMIN — Medication 37.5 MILLIGRAM(S): at 12:34

## 2022-10-06 RX ADMIN — Medication 500000 UNIT(S): at 12:36

## 2022-10-06 RX ADMIN — Medication 1 SPRAY(S): at 07:16

## 2022-10-06 RX ADMIN — Medication 3 MILLILITER(S): at 10:02

## 2022-10-06 RX ADMIN — Medication 8 UNIT(S): at 08:08

## 2022-10-06 RX ADMIN — BUDESONIDE AND FORMOTEROL FUMARATE DIHYDRATE 2 PUFF(S): 160; 4.5 AEROSOL RESPIRATORY (INHALATION) at 21:34

## 2022-10-06 RX ADMIN — Medication 1 SPRAY(S): at 18:39

## 2022-10-06 RX ADMIN — Medication 8 UNIT(S): at 12:33

## 2022-10-06 RX ADMIN — PANTOPRAZOLE SODIUM 40 MILLIGRAM(S): 20 TABLET, DELAYED RELEASE ORAL at 07:19

## 2022-10-06 RX ADMIN — POLYETHYLENE GLYCOL 3350 17 GRAM(S): 17 POWDER, FOR SOLUTION ORAL at 18:37

## 2022-10-06 RX ADMIN — PIPERACILLIN AND TAZOBACTAM 25 GRAM(S): 4; .5 INJECTION, POWDER, LYOPHILIZED, FOR SOLUTION INTRAVENOUS at 22:07

## 2022-10-06 RX ADMIN — Medication 3 MILLILITER(S): at 15:04

## 2022-10-06 RX ADMIN — Medication 3 MILLILITER(S): at 03:17

## 2022-10-06 RX ADMIN — Medication 500 MILLIGRAM(S): at 07:17

## 2022-10-06 RX ADMIN — Medication 50 MILLIGRAM(S): at 07:16

## 2022-10-06 RX ADMIN — PIPERACILLIN AND TAZOBACTAM 25 GRAM(S): 4; .5 INJECTION, POWDER, LYOPHILIZED, FOR SOLUTION INTRAVENOUS at 07:14

## 2022-10-06 RX ADMIN — TIOTROPIUM BROMIDE 1 CAPSULE(S): 18 CAPSULE ORAL; RESPIRATORY (INHALATION) at 10:25

## 2022-10-06 RX ADMIN — ENOXAPARIN SODIUM 30 MILLIGRAM(S): 100 INJECTION SUBCUTANEOUS at 13:51

## 2022-10-06 RX ADMIN — LIDOCAINE AND PRILOCAINE CREAM 1 APPLICATION(S): 25; 25 CREAM TOPICAL at 18:37

## 2022-10-06 RX ADMIN — Medication 20 MILLIGRAM(S): at 07:15

## 2022-10-06 RX ADMIN — BUDESONIDE AND FORMOTEROL FUMARATE DIHYDRATE 2 PUFF(S): 160; 4.5 AEROSOL RESPIRATORY (INHALATION) at 10:25

## 2022-10-06 NOTE — PROGRESS NOTE ADULT - SUBJECTIVE AND OBJECTIVE BOX
Follow Up:  bronchiectasis exacerbation     Interval History/ROS, no fever, improved cough and SOB, no vomiting, no dysuria      Allergies  No Known Allergies        ANTIMICROBIALS:  colistimethate for Nebulization 150 every 12 hours  nystatin    Suspension 910045 daily  piperacillin/tazobactam IVPB.. 3.375 every 8 hours      OTHER MEDS:  acetaminophen     Tablet .. 500 milliGRAM(s) Oral every 6 hours  ALBUTerol    90 MICROgram(s) HFA Inhaler 2 Puff(s) Inhalation every 6 hours PRN  albuterol/ipratropium for Nebulization 3 milliLiter(s) Nebulizer every 6 hours  amLODIPine   Tablet 5 milliGRAM(s) Oral daily  artificial  tears Solution 1 Drop(s) Both EYES two times a day  bisacodyl 5 milliGRAM(s) Oral at bedtime  budesonide 160 MICROgram(s)/formoterol 4.5 MICROgram(s) Inhaler 2 Puff(s) Inhalation two times a day  dextrose 5%. 1000 milliLiter(s) IV Continuous <Continuous>  dextrose 5%. 1000 milliLiter(s) IV Continuous <Continuous>  dextrose 50% Injectable 25 Gram(s) IV Push once  dextrose 50% Injectable 12.5 Gram(s) IV Push once  dextrose 50% Injectable 25 Gram(s) IV Push once  dextrose Oral Gel 15 Gram(s) Oral once PRN  enoxaparin Injectable 30 milliGRAM(s) SubCutaneous every 24 hours  ferrous    sulfate 325 milliGRAM(s) Oral daily  fluticasone propionate 50 MICROgram(s)/spray Nasal Spray 1 Spray(s) Both Nostrils two times a day  glucagon  Injectable 1 milliGRAM(s) IntraMuscular once  influenza  Vaccine (HIGH DOSE) 0.7 milliLiter(s) IntraMuscular once  insulin glargine Injectable (LANTUS) 11 Unit(s) SubCutaneous at bedtime  insulin lispro (ADMELOG) corrective regimen sliding scale   SubCutaneous three times a day before meals  insulin lispro (ADMELOG) corrective regimen sliding scale   SubCutaneous at bedtime  insulin lispro Injectable (ADMELOG) 8 Unit(s) SubCutaneous three times a day before meals  lidocaine/prilocaine Cream 1 Application(s) Topical daily  metoprolol succinate ER 50 milliGRAM(s) Oral daily  montelukast 10 milliGRAM(s) Oral daily  multivitamin 1 Tablet(s) Oral daily  pantoprazole    Tablet 40 milliGRAM(s) Oral before breakfast  polyethylene glycol 3350 17 Gram(s) Oral two times a day  predniSONE   Tablet 20 milliGRAM(s) Oral daily  simethicone 80 milliGRAM(s) Chew four times a day PRN  sodium chloride 3%  Inhalation 4 milliLiter(s) Inhalation every 8 hours PRN  tiotropium 18 MICROgram(s) Capsule 1 Capsule(s) Inhalation daily  venlafaxine XR. 37.5 milliGRAM(s) Oral daily      Vital Signs Last 24 Hrs  T(C): 36.3 (06 Oct 2022 10:35), Max: 37.1 (05 Oct 2022 17:35)  T(F): 97.3 (06 Oct 2022 10:35), Max: 98.8 (05 Oct 2022 17:35)  HR: 94 (06 Oct 2022 11:29) (81 - 99)  BP: 142/63 (06 Oct 2022 10:35) (126/75 - 142/63)  BP(mean): --  RR: 18 (06 Oct 2022 10:35) (18 - 20)  SpO2: 97% (06 Oct 2022 11:29) (95% - 100%)    Parameters below as of 06 Oct 2022 10:35  Patient On (Oxygen Delivery Method): nasal cannula  O2 Flow (L/min): 4      Physical Exam:  General:    NAD,  non toxic  Cardio:     regular S1, S2,  no murmur  Respiratory:  rhonchi  abd:     soft,   BS +,   no tenderness  :   no CVAT,  no suprapubic tenderness,   no  francisco  Musculoskeletal:   no joint swelling  vascular: no phlebitis  Skin:    no rash                          12.1   9.70  )-----------( 222      ( 05 Oct 2022 06:21 )             38.0       10-05    138  |  96<L>  |  16  ----------------------------<  148<H>  3.5   |  31  |  0.33<L>    Ca    9.4      05 Oct 2022 06:21  Phos  3.1     10-05  Mg     1.90     10-05    TPro  6.2  /  Alb  3.2<L>  /  TBili  <0.2  /  DBili  x   /  AST  41<H>  /  ALT  37<H>  /  AlkPhos  194<H>  10-05          MICROBIOLOGY:  v  .Sputum Sputum  09-28-22   Numerous Pseudomonas aeruginosa  Normal Respiratory Denise present  --  Pseudomonas aeruginosa      Clean Catch Clean Catch (Midstream)  09-28-22   No growth  --  --                RADIOLOGY:  Images independently visualized and reviewed personally, findings as below  < from: MR MRCP No Cont (10.02.22 @ 14:47) >  Pneumobilia ; no intrahepatic or extrahepatic biliary dilatation.    Hepaticojejunostomy. Pneumobilia but no choledocholithiasis in the common   hepatic duct leading to the hepaticojejunostomy.    Previously seen calculi in CT are located in the distal preampullary   precluded CBD.    < end of copied text >  < from: CT Chest w/ IV Cont (09.28.22 @ 03:28) >  IMPRESSION:    1. Pulmonary findings are compatible with a chronic infectious or   inflammatory bronchitis and bronchiolitis.  2. Punctate nonobstructing choledocholithiasis in the distal common duct,   new from the prior study.      < end of copied text >

## 2022-10-06 NOTE — PROGRESS NOTE ADULT - PROBLEM SELECTOR PLAN 1
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 Last settings documented at [facility]:  AVAPS RR 18, EPAP 5, Min IP 15, Max IP 25,   - duonebs q6h  -Started Prednisone as per Pulm   - Zosyn+ inhaled Colistin per ID recs  - transfer to [facility] for specialized care when bed becomes available  - appreciate pulm and ID recs

## 2022-10-06 NOTE — PROGRESS NOTE ADULT - SUBJECTIVE AND OBJECTIVE BOX
Chief Complaint: DM 2    History: Patient seen at bedside with  present. Noted per chart patient had hyperglycemia last night, 373 mg/dL, patient was agreeable to standing 8 units of insulin, however refused additional Admelog 10 units per sliding scale - was given 4 units additional by primary team instead  Today, patient accepting of insulin dosing as ordered, BG trending within target range  No hypoglycemia     MEDICATIONS  (STANDING):  acetaminophen     Tablet .. 500 milliGRAM(s) Oral every 6 hours  albuterol/ipratropium for Nebulization 3 milliLiter(s) Nebulizer every 6 hours  amLODIPine   Tablet 5 milliGRAM(s) Oral daily  bisacodyl 5 milliGRAM(s) Oral at bedtime  budesonide 160 MICROgram(s)/formoterol 4.5 MICROgram(s) Inhaler 2 Puff(s) Inhalation two times a day  colistimethate for Nebulization 150 milliGRAM(s) CBA Nebulizer every 12 hours  dextrose 5%. 1000 milliLiter(s) (50 mL/Hr) IV Continuous <Continuous>  dextrose 5%. 1000 milliLiter(s) (100 mL/Hr) IV Continuous <Continuous>  dextrose 50% Injectable 25 Gram(s) IV Push once  dextrose 50% Injectable 12.5 Gram(s) IV Push once  dextrose 50% Injectable 25 Gram(s) IV Push once  enoxaparin Injectable 30 milliGRAM(s) SubCutaneous every 24 hours  ferrous    sulfate 325 milliGRAM(s) Oral daily  fluticasone propionate 50 MICROgram(s)/spray Nasal Spray 1 Spray(s) Both Nostrils two times a day  glucagon  Injectable 1 milliGRAM(s) IntraMuscular once  influenza  Vaccine (HIGH DOSE) 0.7 milliLiter(s) IntraMuscular once  insulin glargine Injectable (LANTUS) 11 Unit(s) SubCutaneous at bedtime  insulin lispro (ADMELOG) corrective regimen sliding scale   SubCutaneous three times a day before meals  insulin lispro (ADMELOG) corrective regimen sliding scale   SubCutaneous at bedtime  insulin lispro Injectable (ADMELOG) 8 Unit(s) SubCutaneous three times a day before meals  lidocaine/prilocaine Cream 1 Application(s) Topical daily  metoprolol succinate ER 50 milliGRAM(s) Oral daily  montelukast 10 milliGRAM(s) Oral daily  multivitamin 1 Tablet(s) Oral daily  nystatin    Suspension 391906 Unit(s) Oral daily  pantoprazole    Tablet 40 milliGRAM(s) Oral before breakfast  piperacillin/tazobactam IVPB.. 3.375 Gram(s) IV Intermittent every 8 hours  polyethylene glycol 3350 17 Gram(s) Oral two times a day  predniSONE   Tablet 20 milliGRAM(s) Oral daily  tiotropium 18 MICROgram(s) Capsule 1 Capsule(s) Inhalation daily  venlafaxine XR. 37.5 milliGRAM(s) Oral daily    MEDICATIONS  (PRN):  ALBUTerol    90 MICROgram(s) HFA Inhaler 2 Puff(s) Inhalation every 6 hours PRN Shortness of Breath and/or Wheezing  dextrose Oral Gel 15 Gram(s) Oral once PRN Blood Glucose LESS THAN 70 milliGRAM(s)/deciliter  simethicone 80 milliGRAM(s) Chew four times a day PRN Gas Pain  sodium chloride 3%  Inhalation 4 milliLiter(s) Inhalation every 8 hours PRN with aerobica/airway clearance    No Known Allergies    Review of Systems:  Cardiovascular: No chest pain  Respiratory: +PEMBERTON  GI: No nausea, vomiting  Endocrine: no hypoglycemia     PHYSICAL EXAM:  VITALS: T(C): 36.3 (10-06-22 @ 10:35)  T(F): 97.3 (10-06-22 @ 10:35), Max: 98.8 (10-05-22 @ 17:35)  HR: 94 (10-06-22 @ 11:29) (81 - 99)  BP: 142/63 (10-06-22 @ 10:35) (126/75 - 142/63)  RR:  (18 - 20)  SpO2:  (95% - 100%)  Wt(kg): --  GENERAL: NAD  EYES: No proptosis, no lid lag, anicteric  HEENT:  Atraumatic, Normocephalic, moist mucous membranes  RESPIRATORY: unlabored respirations on supplemental 02    CAPILLARY BLOOD GLUCOSE    POCT Blood Glucose.: 183 mg/dL (06 Oct 2022 11:57)  POCT Blood Glucose.: 130 mg/dL (06 Oct 2022 07:47)  POCT Blood Glucose.: 264 mg/dL (06 Oct 2022 00:24)  POCT Blood Glucose.: 177 mg/dL (05 Oct 2022 22:39)  POCT Blood Glucose.: 218 mg/dL (05 Oct 2022 21:07)  POCT Blood Glucose.: 373 mg/dL (05 Oct 2022 17:20)      10-05    138  |  96<L>  |  16  ----------------------------<  148<H>  3.5   |  31  |  0.33<L>    eGFR: 115    Ca    9.4      10-05  Mg     1.90     10-05  Phos  3.1     10-05    TPro  6.2  /  Alb  3.2<L>  /  TBili  <0.2  /  DBili  x   /  AST  41<H>  /  ALT  37<H>  /  AlkPhos  194<H>  10-05      A1C with Estimated Average Glucose Result: 9.4 % (09-29-22 @ 05:00)  A1C with Estimated Average Glucose Result: 9.4 % (01-21-22 @ 14:26)    Diet, Consistent Carbohydrate/No Snacks:   Fiber/Residue Restricted (LOWFIBER)  DASH/TLC Sodium & Cholesterol Restricted (DASH)  No Beef  No Pork  Supplement Feeding Modality:  Oral  Glucerna Shake Cans or Servings Per Day:  2       Frequency:  Daily (10-04-22 @ 17:52) [Active]

## 2022-10-06 NOTE — PROGRESS NOTE ADULT - ASSESSMENT
67 f with Primary Ciliary Dyskinesia, Chronic hyoxemic/hypercapneic Respiratory Failure on 2-3L Home O2 NC secondary to Bronchiectasis on AVAPS, Cirrhosis, Prior CBD stricture s/p dilation, stent placement and removal, HCV Ab+, presented to the ED for abdominal pain  afebrile, no WBC, LFTs unremarkable  chest/abd CT 9/28 : chronic infectious or inflammatory bronchitis and bronchiolitis.  Punctate nonobstructing choledocholithiasis in the distal common duct, new from the prior study.  GI evaluated the pt and recommended outpatient ERCP and MRCP was done 10/2 which showed Hepaticojejunostomy, Pneumobilia but no choledocholithiasis in CBD leading to the hepaticojejunostomy. Previously seen calculi in CT are located in the distal preampullary precluded CBD.  sputum cx with pseudomonas (R to genta, tobra, levo)    * c/w zosyn as the pseudomonas is sensitive to zosyn, antibiotics started 9/28, now day 9  * will complete a 10-14 day zcourse  * pt did not tolerate inhaled tobra and amikacin  * c/w inhaled colistin   * f/u with pulmonary  * abd pain management as per GI  The above assessment and plan was discussed with the primary team    Urmila Rose MD  contact on teams  After 5pm and on weekends call [phone]     67 f with Primary Ciliary Dyskinesia, Chronic hyoxemic/hypercapneic Respiratory Failure on 2-3L Home O2 NC secondary to Bronchiectasis on AVAPS, Cirrhosis, Prior CBD stricture s/p dilation, stent placement and removal, HCV Ab+, presented to the ED for abdominal pain  afebrile, no WBC, LFTs unremarkable  chest/abd CT 9/28 : chronic infectious or inflammatory bronchitis and bronchiolitis.  Punctate nonobstructing choledocholithiasis in the distal common duct, new from the prior study.  GI evaluated the pt and recommended outpatient ERCP and MRCP was done 10/2 which showed Hepaticojejunostomy, Pneumobilia but no choledocholithiasis in CBD leading to the hepaticojejunostomy. Previously seen calculi in CT are located in the distal preampullary precluded CBD.  sputum cx with pseudomonas (R to genta, tobra, levo)    * c/w zosyn as the pseudomonas is sensitive to zosyn, antibiotics started 9/28, now day 9  * will complete a 10 day course tomorrow  * pt did not tolerate inhaled tobra and amikacin  * c/w inhaled colistin as per pulmonary  * abd pain management as per GI  * will sign off, please call with questions  The above assessment and plan was discussed with the primary team    Urmila Rose MD  contact on teams  After 5pm and on weekends call [phone]

## 2022-10-06 NOTE — PROGRESS NOTE ADULT - ASSESSMENT
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  Endocrinology consulted for assistance with management of type 2 diabetes.    1. Type 2 Diabetes Mellitus, poorly controlled  HbA1c: 9.4%  Home Regimen: Lantus 15u HS, Lispro 5u AC  Suspect that her BG fluctuate at home with varying doses of steroids    While inpatient:  BG target 140-180 mg/dl  Continue Lantus 11 units SQ qHS  Continue Admelog 8 units SQ TID before meals (Hold if NPO/skips meal)   Continue Admelog moderate dose correctional scale before meals (while on steroids), low dose at bedtime  Check BG before meals and bedtime  Consistent carbohydrate diet  Hypoglycemia protocol     Discharge Plan:  Basal/bolus insulin, doses to be determined.   Can consider using correctional scale to assist with better glucose control at home (per daughter, they are estimating, but no structured correctional scale)  Ensure patient has glucometer, glucose test strips, lancets, alcohol swabs and insulin PEN needles  Followup with [facility] Specialties at [place], Endocrine Clinic: [phone] [facility] [address], [phone]    2. Steroid induced hyperglycemia  Patient started on Prednisone 20 mg daily by pulmonology 10/3/22  Per primary team, no immediate plans for changing dose of Prednisone or tapering, it will be slowly tapered outpatient  Discussed with patient/family the effect of steroids on blood glucose (insulin will need to be adjusted with adjustment of steroid dose), they verbalize understanding    3. Hypertension  BP goal <130/80  Close to target on Metoprolol and Amlodipine   Further management as per primary team    4. Hyperlipidemia  LDL target less than 70  LDL resulted 60, not on statin   Ok to defer statin for now but would reconsider as outpatient. Patient is in statin benefit group, has type 2 DM and age 40-75    Sabrina Plaza  Nurse Practitioner  Division of Endocrinology & Diabetes  In house pager #[phone]/long range pager #[phone]    If before 9AM or after 6PM, or on weekends/holidays, please call endocrine answering service for assistance ([phone]).  For nonurgent matters email [email or web address] for assistance.

## 2022-10-06 NOTE — PROGRESS NOTE ADULT - SUBJECTIVE AND OBJECTIVE BOX
Patient is a 67y old  Female who presents with a chief complaint of abdominal pain (05 Oct 2022 15:59)      SUBJECTIVE / OVERNIGHT EVENTS: Pt seen and examined with Video  Service [facility]  ID [id number]. only c/o weakness and (+) itching. abd pain improved. (+) BM.     MEDICATIONS  (STANDING):  acetaminophen     Tablet .. 500 milliGRAM(s) Oral every 6 hours  albuterol/ipratropium for Nebulization 3 milliLiter(s) Nebulizer every 6 hours  amLODIPine   Tablet 5 milliGRAM(s) Oral daily  bisacodyl 5 milliGRAM(s) Oral at bedtime  budesonide 160 MICROgram(s)/formoterol 4.5 MICROgram(s) Inhaler 2 Puff(s) Inhalation two times a day  colistimethate for Nebulization 150 milliGRAM(s) CBA Nebulizer every 12 hours  dextrose 5%. 1000 milliLiter(s) (50 mL/Hr) IV Continuous <Continuous>  dextrose 5%. 1000 milliLiter(s) (100 mL/Hr) IV Continuous <Continuous>  dextrose 50% Injectable 25 Gram(s) IV Push once  dextrose 50% Injectable 12.5 Gram(s) IV Push once  dextrose 50% Injectable 25 Gram(s) IV Push once  enoxaparin Injectable 30 milliGRAM(s) SubCutaneous every 24 hours  ferrous    sulfate 325 milliGRAM(s) Oral daily  fluticasone propionate 50 MICROgram(s)/spray Nasal Spray 1 Spray(s) Both Nostrils two times a day  glucagon  Injectable 1 milliGRAM(s) IntraMuscular once  influenza  Vaccine (HIGH DOSE) 0.7 milliLiter(s) IntraMuscular once  insulin glargine Injectable (LANTUS) 11 Unit(s) SubCutaneous at bedtime  insulin lispro (ADMELOG) corrective regimen sliding scale   SubCutaneous three times a day before meals  insulin lispro (ADMELOG) corrective regimen sliding scale   SubCutaneous at bedtime  insulin lispro Injectable (ADMELOG) 8 Unit(s) SubCutaneous three times a day before meals  lidocaine/prilocaine Cream 1 Application(s) Topical daily  metoprolol succinate ER 50 milliGRAM(s) Oral daily  montelukast 10 milliGRAM(s) Oral daily  multivitamin 1 Tablet(s) Oral daily  nystatin    Suspension 454244 Unit(s) Oral daily  pantoprazole    Tablet 40 milliGRAM(s) Oral before breakfast  piperacillin/tazobactam IVPB.. 3.375 Gram(s) IV Intermittent every 8 hours  polyethylene glycol 3350 17 Gram(s) Oral two times a day  predniSONE   Tablet 20 milliGRAM(s) Oral daily  tiotropium 18 MICROgram(s) Capsule 1 Capsule(s) Inhalation daily  venlafaxine XR. 37.5 milliGRAM(s) Oral daily    MEDICATIONS  (PRN):  ALBUTerol    90 MICROgram(s) HFA Inhaler 2 Puff(s) Inhalation every 6 hours PRN Shortness of Breath and/or Wheezing  dextrose Oral Gel 15 Gram(s) Oral once PRN Blood Glucose LESS THAN 70 milliGRAM(s)/deciliter  simethicone 80 milliGRAM(s) Chew four times a day PRN Gas Pain  sodium chloride 3%  Inhalation 4 milliLiter(s) Inhalation every 8 hours PRN with aerobica/airway clearance      Vital Signs Last 24 Hrs  T(C): 36.3 (06 Oct 2022 10:35), Max: 37.1 (05 Oct 2022 17:35)  T(F): 97.3 (06 Oct 2022 10:35), Max: 98.8 (05 Oct 2022 17:35)  HR: 94 (06 Oct 2022 11:29) (81 - 99)  BP: 142/63 (06 Oct 2022 10:35) (126/75 - 142/63)  BP(mean): --  RR: 18 (06 Oct 2022 10:35) (18 - 20)  SpO2: 97% (06 Oct 2022 11:29) (95% - 100%)    Parameters below as of 06 Oct 2022 10:35  Patient On (Oxygen Delivery Method): nasal cannula  O2 Flow (L/min): 4    CAPILLARY BLOOD GLUCOSE      POCT Blood Glucose.: 183 mg/dL (06 Oct 2022 11:57)  POCT Blood Glucose.: 130 mg/dL (06 Oct 2022 07:47)  POCT Blood Glucose.: 264 mg/dL (06 Oct 2022 00:24)  POCT Blood Glucose.: 177 mg/dL (05 Oct 2022 22:39)  POCT Blood Glucose.: 218 mg/dL (05 Oct 2022 21:07)  POCT Blood Glucose.: 373 mg/dL (05 Oct 2022 17:20)    I&O's Summary    05 Oct 2022 07:01  -  06 Oct 2022 07:00  --------------------------------------------------------  IN: 0 mL / OUT: 750 mL / NET: -750 mL    06 Oct 2022 07:01  -  06 Oct 2022 15:46  --------------------------------------------------------  IN: 480 mL / OUT: 700 mL / NET: -220 mL        PHYSICAL EXAM:  GENERAL: NAD, well-developed  HEAD:  Atraumatic, Normocephalic  EYES: EOMI, PERRLA, conjunctiva and sclera clear  NECK: Supple, No JVD  CHEST/LUNG: (+) scattered rhonchi and crackles bilaterally; No wheeze  HEART: Regular rate and rhythm; No murmurs, rubs, or gallops  ABDOMEN: Soft, Nontender, Nondistended; Bowel sounds present  EXTREMITIES:  2+ Peripheral Pulses, No clubbing, cyanosis, or edema  PSYCH: AAOx3  NEUROLOGY: non-focal  SKIN: No rashes     LABS:                        12.1   9.70  )-----------( 222      ( 05 Oct 2022 06:21 )             38.0     10-05    138  |  96<L>  |  16  ----------------------------<  148<H>  3.5   |  31  |  0.33<L>    Ca    9.4      05 Oct 2022 06:21  Phos  3.1     10-05  Mg     1.90     10-05    TPro  6.2  /  Alb  3.2<L>  /  TBili  <0.2  /  DBili  x   /  AST  41<H>  /  ALT  37<H>  /  AlkPhos  194<H>  10-05              RADIOLOGY & ADDITIONAL TESTS:    Imaging Personally Reviewed:    Consultant(s) Notes Reviewed:      Care Discussed with Consultants/Other Providers:

## 2022-10-06 NOTE — PROGRESS NOTE ADULT - PROBLEM SELECTOR PLAN 4
# possibly bilary colic 2/2 choledocholithiasis 2/2 PCD  - f/u MRCP, no stone in CBD, but a stone in the distal preampullary; contact ed GI re: MRCP results. No intervention as per GI rec.  -Will check LFTs in AM to r/o hyperbilirubinemia      # analgesia  - Patient seemed to be taking omeprazole for minimal abdominal pain relief but might benefit from acetaminophen, possibly avoid opiates to avoid biliary colic, possibly avoid NSAID to avoid mucosal irritation/bleed (hx GERD)  - acetaminophen 500 po q6h standing, Toradol 15 q8 PRN for pain    # weight loss - likely due to poor intake for pain avoidance, though malignancy possible  - diet as per nutrition, multivitamin (has home iron), weekly weights

## 2022-10-07 LAB
ALBUMIN SERPL ELPH-MCNC: 3 G/DL — LOW (ref 3.3–5)
ALP SERPL-CCNC: 204 U/L — HIGH (ref 40–120)
ALT FLD-CCNC: 77 U/L — HIGH (ref 4–33)
ANION GAP SERPL CALC-SCNC: 5 MMOL/L — LOW (ref 7–14)
AST SERPL-CCNC: 93 U/L — HIGH (ref 4–32)
BASOPHILS # BLD AUTO: 0.05 K/UL — SIGNIFICANT CHANGE UP (ref 0–0.2)
BASOPHILS NFR BLD AUTO: 0.7 % — SIGNIFICANT CHANGE UP (ref 0–2)
BILIRUB DIRECT SERPL-MCNC: <0.2 MG/DL — SIGNIFICANT CHANGE UP (ref 0–0.3)
BILIRUB INDIRECT FLD-MCNC: SIGNIFICANT CHANGE UP MG/DL (ref 0–1)
BILIRUB SERPL-MCNC: <0.2 MG/DL — SIGNIFICANT CHANGE UP (ref 0.2–1.2)
BUN SERPL-MCNC: 16 MG/DL — SIGNIFICANT CHANGE UP (ref 7–23)
CALCIUM SERPL-MCNC: 9.2 MG/DL — SIGNIFICANT CHANGE UP (ref 8.4–10.5)
CHLORIDE SERPL-SCNC: 96 MMOL/L — LOW (ref 98–107)
CO2 SERPL-SCNC: 35 MMOL/L — HIGH (ref 22–31)
CREAT SERPL-MCNC: 0.28 MG/DL — LOW (ref 0.5–1.3)
EGFR: 120 ML/MIN/1.73M2 — SIGNIFICANT CHANGE UP
EOSINOPHIL # BLD AUTO: 0.12 K/UL — SIGNIFICANT CHANGE UP (ref 0–0.5)
EOSINOPHIL NFR BLD AUTO: 1.6 % — SIGNIFICANT CHANGE UP (ref 0–6)
GLUCOSE BLDC GLUCOMTR-MCNC: 115 MG/DL — HIGH (ref 70–99)
GLUCOSE BLDC GLUCOMTR-MCNC: 118 MG/DL — HIGH (ref 70–99)
GLUCOSE BLDC GLUCOMTR-MCNC: 130 MG/DL — HIGH (ref 70–99)
GLUCOSE BLDC GLUCOMTR-MCNC: 135 MG/DL — HIGH (ref 70–99)
GLUCOSE BLDC GLUCOMTR-MCNC: 175 MG/DL — HIGH (ref 70–99)
GLUCOSE BLDC GLUCOMTR-MCNC: 225 MG/DL — HIGH (ref 70–99)
GLUCOSE BLDC GLUCOMTR-MCNC: 239 MG/DL — HIGH (ref 70–99)
GLUCOSE BLDC GLUCOMTR-MCNC: 90 MG/DL — SIGNIFICANT CHANGE UP (ref 70–99)
GLUCOSE SERPL-MCNC: 172 MG/DL — HIGH (ref 70–99)
HCT VFR BLD CALC: 35.7 % — SIGNIFICANT CHANGE UP (ref 34.5–45)
HGB BLD-MCNC: 11 G/DL — LOW (ref 11.5–15.5)
IANC: 3.81 K/UL — SIGNIFICANT CHANGE UP (ref 1.8–7.4)
IMM GRANULOCYTES NFR BLD AUTO: 2 % — HIGH (ref 0–0.9)
LYMPHOCYTES # BLD AUTO: 2.75 K/UL — SIGNIFICANT CHANGE UP (ref 1–3.3)
LYMPHOCYTES # BLD AUTO: 36 % — SIGNIFICANT CHANGE UP (ref 13–44)
MAGNESIUM SERPL-MCNC: 2 MG/DL — SIGNIFICANT CHANGE UP (ref 1.6–2.6)
MCHC RBC-ENTMCNC: 27.1 PG — SIGNIFICANT CHANGE UP (ref 27–34)
MCHC RBC-ENTMCNC: 30.8 GM/DL — LOW (ref 32–36)
MCV RBC AUTO: 87.9 FL — SIGNIFICANT CHANGE UP (ref 80–100)
MONOCYTES # BLD AUTO: 0.75 K/UL — SIGNIFICANT CHANGE UP (ref 0–0.9)
MONOCYTES NFR BLD AUTO: 9.8 % — SIGNIFICANT CHANGE UP (ref 2–14)
NEUTROPHILS # BLD AUTO: 3.81 K/UL — SIGNIFICANT CHANGE UP (ref 1.8–7.4)
NEUTROPHILS NFR BLD AUTO: 49.9 % — SIGNIFICANT CHANGE UP (ref 43–77)
NRBC # BLD: 0 /100 WBCS — SIGNIFICANT CHANGE UP (ref 0–0)
NRBC # FLD: 0 K/UL — SIGNIFICANT CHANGE UP (ref 0–0)
PHOSPHATE SERPL-MCNC: 2.9 MG/DL — SIGNIFICANT CHANGE UP (ref 2.5–4.5)
PLATELET # BLD AUTO: 245 K/UL — SIGNIFICANT CHANGE UP (ref 150–400)
POTASSIUM SERPL-MCNC: 3.9 MMOL/L — SIGNIFICANT CHANGE UP (ref 3.5–5.3)
POTASSIUM SERPL-SCNC: 3.9 MMOL/L — SIGNIFICANT CHANGE UP (ref 3.5–5.3)
PROT SERPL-MCNC: 6.1 G/DL — SIGNIFICANT CHANGE UP (ref 6–8.3)
RBC # BLD: 4.06 M/UL — SIGNIFICANT CHANGE UP (ref 3.8–5.2)
RBC # FLD: 13.4 % — SIGNIFICANT CHANGE UP (ref 10.3–14.5)
SODIUM SERPL-SCNC: 136 MMOL/L — SIGNIFICANT CHANGE UP (ref 135–145)
WBC # BLD: 7.63 K/UL — SIGNIFICANT CHANGE UP (ref 3.8–10.5)
WBC # FLD AUTO: 7.63 K/UL — SIGNIFICANT CHANGE UP (ref 3.8–10.5)

## 2022-10-07 PROCEDURE — 99232 SBSQ HOSP IP/OBS MODERATE 35: CPT

## 2022-10-07 PROCEDURE — 99233 SBSQ HOSP IP/OBS HIGH 50: CPT

## 2022-10-07 PROCEDURE — 99232 SBSQ HOSP IP/OBS MODERATE 35: CPT | Mod: GC

## 2022-10-07 RX ORDER — INSULIN LISPRO 100/ML
10 VIAL (ML) SUBCUTANEOUS
Refills: 0 | Status: DISCONTINUED | OUTPATIENT
Start: 2022-10-07 | End: 2022-10-10

## 2022-10-07 RX ADMIN — ENOXAPARIN SODIUM 30 MILLIGRAM(S): 100 INJECTION SUBCUTANEOUS at 15:26

## 2022-10-07 RX ADMIN — SIMETHICONE 80 MILLIGRAM(S): 80 TABLET, CHEWABLE ORAL at 21:41

## 2022-10-07 RX ADMIN — PIPERACILLIN AND TAZOBACTAM 25 GRAM(S): 4; .5 INJECTION, POWDER, LYOPHILIZED, FOR SOLUTION INTRAVENOUS at 07:00

## 2022-10-07 RX ADMIN — Medication 5 MILLIGRAM(S): at 21:40

## 2022-10-07 RX ADMIN — POLYETHYLENE GLYCOL 3350 17 GRAM(S): 17 POWDER, FOR SOLUTION ORAL at 17:15

## 2022-10-07 RX ADMIN — Medication 3 MILLILITER(S): at 23:03

## 2022-10-07 RX ADMIN — Medication 325 MILLIGRAM(S): at 11:30

## 2022-10-07 RX ADMIN — PANTOPRAZOLE SODIUM 40 MILLIGRAM(S): 20 TABLET, DELAYED RELEASE ORAL at 06:35

## 2022-10-07 RX ADMIN — MONTELUKAST 10 MILLIGRAM(S): 4 TABLET, CHEWABLE ORAL at 11:31

## 2022-10-07 RX ADMIN — PIPERACILLIN AND TAZOBACTAM 25 GRAM(S): 4; .5 INJECTION, POWDER, LYOPHILIZED, FOR SOLUTION INTRAVENOUS at 23:55

## 2022-10-07 RX ADMIN — POLYETHYLENE GLYCOL 3350 17 GRAM(S): 17 POWDER, FOR SOLUTION ORAL at 06:34

## 2022-10-07 RX ADMIN — Medication 500 MILLIGRAM(S): at 17:15

## 2022-10-07 RX ADMIN — TIOTROPIUM BROMIDE 1 CAPSULE(S): 18 CAPSULE ORAL; RESPIRATORY (INHALATION) at 10:42

## 2022-10-07 RX ADMIN — Medication 20 MILLIGRAM(S): at 06:34

## 2022-10-07 RX ADMIN — BUDESONIDE AND FORMOTEROL FUMARATE DIHYDRATE 2 PUFF(S): 160; 4.5 AEROSOL RESPIRATORY (INHALATION) at 21:39

## 2022-10-07 RX ADMIN — Medication 1 TABLET(S): at 11:31

## 2022-10-07 RX ADMIN — Medication 500000 UNIT(S): at 11:31

## 2022-10-07 RX ADMIN — Medication 1 SPRAY(S): at 17:15

## 2022-10-07 RX ADMIN — Medication 1 SPRAY(S): at 06:33

## 2022-10-07 RX ADMIN — Medication 500 MILLIGRAM(S): at 00:42

## 2022-10-07 RX ADMIN — Medication 500 MILLIGRAM(S): at 23:59

## 2022-10-07 RX ADMIN — LIDOCAINE AND PRILOCAINE CREAM 1 APPLICATION(S): 25; 25 CREAM TOPICAL at 12:08

## 2022-10-07 RX ADMIN — Medication 500 MILLIGRAM(S): at 01:45

## 2022-10-07 RX ADMIN — INSULIN GLARGINE 11 UNIT(S): 100 INJECTION, SOLUTION SUBCUTANEOUS at 21:42

## 2022-10-07 RX ADMIN — PIPERACILLIN AND TAZOBACTAM 25 GRAM(S): 4; .5 INJECTION, POWDER, LYOPHILIZED, FOR SOLUTION INTRAVENOUS at 15:25

## 2022-10-07 RX ADMIN — Medication 1 DROP(S): at 17:18

## 2022-10-07 RX ADMIN — Medication 37.5 MILLIGRAM(S): at 11:30

## 2022-10-07 RX ADMIN — Medication 50 MILLIGRAM(S): at 06:32

## 2022-10-07 RX ADMIN — Medication 3 MILLILITER(S): at 16:47

## 2022-10-07 RX ADMIN — Medication 1 DROP(S): at 06:35

## 2022-10-07 RX ADMIN — Medication 8 UNIT(S): at 07:59

## 2022-10-07 RX ADMIN — Medication 10 UNIT(S): at 17:38

## 2022-10-07 RX ADMIN — Medication 500 MILLIGRAM(S): at 06:30

## 2022-10-07 RX ADMIN — BUDESONIDE AND FORMOTEROL FUMARATE DIHYDRATE 2 PUFF(S): 160; 4.5 AEROSOL RESPIRATORY (INHALATION) at 10:42

## 2022-10-07 RX ADMIN — Medication 3 MILLILITER(S): at 04:00

## 2022-10-07 RX ADMIN — Medication 10 UNIT(S): at 12:09

## 2022-10-07 RX ADMIN — AMLODIPINE BESYLATE 5 MILLIGRAM(S): 2.5 TABLET ORAL at 06:35

## 2022-10-07 RX ADMIN — Medication 500 MILLIGRAM(S): at 11:31

## 2022-10-07 NOTE — PROGRESS NOTE ADULT - ASSESSMENT
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  Endocrinology consulted for assistance with management of type 2 diabetes.    1. Type 2 Diabetes Mellitus, poorly controlled  HbA1c: 9.4%  Home Regimen: Lantus 15u HS, Up to 200 or below 5 units, 200-250; 7 units, 250 to 300; 9 units, 300 to 350; 11 units, 350 to 400; 13 units   Suspect that her BG fluctuate at home with varying doses of steroids    While inpatient:  BG target 140-180 mg/dl  Continue Lantus 11 units SQ qHS  Increase Admelog to 10 units SQ TID before meals (Hold if NPO/skips meal)   Continue Admelog moderate dose correctional scale before meals (while on steroids), low dose at bedtime  Check BG before meals and bedtime  Consistent carbohydrate diet  Hypoglycemia protocol   Provider to RN ; assess husbands insulin pen administration technique     Discharge Plan:  Basal/bolus insulin' Plus Low correction scale TID AC , doses to be determined. This will be based on insulin requirements while inpt on steroids    Ensure patient has glucometer, glucose test strips, lancets, alcohol swabs and insulin PEN needles  Followup with [facility] [place]: [id number] [facility] [id number], [phone]   administers insulin via insulin pen     2. Steroid induced hyperglycemia  Patient started on Prednisone 20 mg daily by pulmonology 10/3/22  c/w prednisone 20mg qd 10/3 as per team will decrease Prednisone to 10mg daily on 10/8 (as per pulm decrease by 10 q 4 days, until off)  Discussed with patient/family the effect of steroids on blood glucose (insulin will need to be adjusted with adjustment of steroid dose), they verbalize understanding    3. Hypertension  BP goal <130/80  Close to target on Metoprolol and Amlodipine   Further management as per primary team    4. Hyperlipidemia  LDL target less than 70  LDL resulted 60, not on statin   Ok to defer statin for now but would reconsider as outpatient. Patient is in statin benefit group, has type 2 DM and age 40-75    Eden Bowers  Nurse Practitioner  Division of Endocrinology & Diabetes  In house pager #[phone]    If before 9AM or after 6PM, or on weekends/holidays, please call endocrine answering service for assistance ([phone]).For nonurgent matters email [email or web address] for assistance.

## 2022-10-07 NOTE — PROGRESS NOTE ADULT - SUBJECTIVE AND OBJECTIVE BOX
CHIEF COMPLAINT:Patient is a 67y old  Female who presents with a chief complaint of abdominal pain (06 Oct 2022 16:40)      Interval Events:    REVIEW OF SYSTEMS:  [x] All other systems negative except per HPI   [ ] Unable to assess ROS because ________    OBJECTIVE:  ICU Vital Signs Last 24 Hrs  T(C): 36.5 (07 Oct 2022 06:30), Max: 36.5 (07 Oct 2022 06:30)  T(F): 97.7 (07 Oct 2022 06:30), Max: 97.7 (07 Oct 2022 06:30)  HR: 80 (07 Oct 2022 06:30) (65 - 99)  BP: 162/90 (07 Oct 2022 06:30) (133/61 - 162/90)  BP(mean): --  ABP: --  ABP(mean): --  RR: 19 (07 Oct 2022 06:30) (18 - 20)  SpO2: 100% (07 Oct 2022 06:30) (95% - 100%)    O2 Parameters below as of 07 Oct 2022 06:30  Patient On (Oxygen Delivery Method): room air          Mode: NIV (Noninvasive Ventilation), RR (machine): 12, TV (machine): 450, FiO2: 35, PEEP: 5, ITime: 1, P-High: 25, P-Low: 10, PIP: 18    10-06 @ 07:01  -  10-07 @ 07:00  --------------------------------------------------------  IN: 720 mL / OUT: 1100 mL / NET: -380 mL        PHYSICAL EXAM:  GENERAL: NAD, well-groomed, well-developed  HEAD:  Atraumatic, Normocephalic  EYES: EOMI, PERRLA, conjunctiva and sclera clear  ENMT: No tonsillar erythema, exudates, or enlargement; Moist mucous membranes, Good dentition, No lesions  NECK: Supple, No JVD, Normal thyroid  CHEST/LUNG: Clear to auscultation bilaterally; No rales, rhonchi, wheezing, or rubs  HEART: Regular rate and rhythm; No murmurs, rubs, or gallops  ABDOMEN: Soft, Nontender, Nondistended; Bowel sounds present  VASCULAR:  2+ Peripheral Pulses, No clubbing, cyanosis, or edema  LYMPH: No lymphadenopathy noted  SKIN: No rashes or lesions  NERVOUS SYSTEM:  Alert & Oriented X3, Good concentration; Motor Strength 5/5 B/L upper and lower extremities; DTRs 2+ intact and symmetric    HOSPITAL MEDICATIONS:  MEDICATIONS  (STANDING):  acetaminophen     Tablet .. 500 milliGRAM(s) Oral every 6 hours  albuterol/ipratropium for Nebulization 3 milliLiter(s) Nebulizer every 6 hours  amLODIPine   Tablet 5 milliGRAM(s) Oral daily  artificial  tears Solution 1 Drop(s) Both EYES two times a day  bisacodyl 5 milliGRAM(s) Oral at bedtime  budesonide 160 MICROgram(s)/formoterol 4.5 MICROgram(s) Inhaler 2 Puff(s) Inhalation two times a day  colistimethate for Nebulization 150 milliGRAM(s) CBA Nebulizer every 12 hours  dextrose 5%. 1000 milliLiter(s) (100 mL/Hr) IV Continuous <Continuous>  dextrose 5%. 1000 milliLiter(s) (50 mL/Hr) IV Continuous <Continuous>  dextrose 50% Injectable 25 Gram(s) IV Push once  dextrose 50% Injectable 12.5 Gram(s) IV Push once  dextrose 50% Injectable 25 Gram(s) IV Push once  enoxaparin Injectable 30 milliGRAM(s) SubCutaneous every 24 hours  ferrous    sulfate 325 milliGRAM(s) Oral daily  fluticasone propionate 50 MICROgram(s)/spray Nasal Spray 1 Spray(s) Both Nostrils two times a day  glucagon  Injectable 1 milliGRAM(s) IntraMuscular once  influenza  Vaccine (HIGH DOSE) 0.7 milliLiter(s) IntraMuscular once  insulin glargine Injectable (LANTUS) 11 Unit(s) SubCutaneous at bedtime  insulin lispro (ADMELOG) corrective regimen sliding scale   SubCutaneous three times a day before meals  insulin lispro (ADMELOG) corrective regimen sliding scale   SubCutaneous at bedtime  insulin lispro Injectable (ADMELOG) 8 Unit(s) SubCutaneous three times a day before meals  lidocaine/prilocaine Cream 1 Application(s) Topical daily  metoprolol succinate ER 50 milliGRAM(s) Oral daily  montelukast 10 milliGRAM(s) Oral daily  multivitamin 1 Tablet(s) Oral daily  nystatin    Suspension 317711 Unit(s) Oral daily  pantoprazole    Tablet 40 milliGRAM(s) Oral before breakfast  piperacillin/tazobactam IVPB.. 3.375 Gram(s) IV Intermittent every 8 hours  polyethylene glycol 3350 17 Gram(s) Oral two times a day  predniSONE   Tablet 20 milliGRAM(s) Oral daily  tiotropium 18 MICROgram(s) Capsule 1 Capsule(s) Inhalation daily  venlafaxine XR. 37.5 milliGRAM(s) Oral daily    MEDICATIONS  (PRN):  ALBUTerol    90 MICROgram(s) HFA Inhaler 2 Puff(s) Inhalation every 6 hours PRN Shortness of Breath and/or Wheezing  dextrose Oral Gel 15 Gram(s) Oral once PRN Blood Glucose LESS THAN 70 milliGRAM(s)/deciliter  simethicone 80 milliGRAM(s) Chew four times a day PRN Gas Pain  sodium chloride 3%  Inhalation 4 milliLiter(s) Inhalation every 8 hours PRN with aerobica/airway clearance      LABS:    The Labs were reviewed by me   The Radiology was reviewed by me    EKG tracing reviewed by me    10-07    136  |  96<L>  |  16  ----------------------------<  172<H>  3.9   |  35<H>  |  0.28<L>  10-05    138  |  96<L>  |  16  ----------------------------<  148<H>  3.5   |  31  |  0.33<L>    Ca    9.2      07 Oct 2022 05:32  Ca    9.4      05 Oct 2022 06:21  Phos  2.9     10-07  Mg     2.00     10-07    TPro  6.1  /  Alb  3.0<L>  /  TBili  <0.2  /  DBili  <0.2  /  AST  93<H>  /  ALT  77<H>  /  AlkPhos  204<H>  10-07  TPro  6.2  /  Alb  3.2<L>  /  TBili  <0.2  /  DBili  x   /  AST  41<H>  /  ALT  37<H>  /  AlkPhos  194<H>  10-05    Magnesium, Serum: 2.00 mg/dL (10-07-22 @ 05:32)  Magnesium, Serum: 1.90 mg/dL (10-05-22 @ 06:21)    Phosphorus Level, Serum: 2.9 mg/dL (10-07-22 @ 05:32)  Phosphorus Level, Serum: 3.1 mg/dL (10-05-22 @ 06:21)                                              11.0   7.63  )-----------( 245      ( 07 Oct 2022 05:32 )             35.7                         12.1   9.70  )-----------( 222      ( 05 Oct 2022 06:21 )             38.0     CAPILLARY BLOOD GLUCOSE      POCT Blood Glucose.: 115 mg/dL (07 Oct 2022 07:16)  POCT Blood Glucose.: 239 mg/dL (07 Oct 2022 01:44)  POCT Blood Glucose.: 225 mg/dL (07 Oct 2022 01:35)  POCT Blood Glucose.: 90 mg/dL (07 Oct 2022 00:40)  POCT Blood Glucose.: 159 mg/dL (06 Oct 2022 21:54)  POCT Blood Glucose.: 350 mg/dL (06 Oct 2022 17:07)  POCT Blood Glucose.: 183 mg/dL (06 Oct 2022 11:57)        MICROBIOLOGY:     RADIOLOGY:  [ ] Reviewed and interpreted by me    Point of Care Ultrasound Findings:    PFT:    EKG: CHIEF COMPLAINT: Patient is a 67y old  Female who presents with a chief complaint of abdominal pain (06 Oct 2022 16:40)  [place]  [place] [id number]    Interval Events:  no overnight events, patient feeling much improved with breathing, cough production at baseline, intermittent abdominal painPatient denies fevers, chills, chest pain, nausea, diarrhea, constipation, dysuria, leg swelling, headache, light headedness    REVIEW OF SYSTEMS:  [x] All other systems negative except per HPI   [ ] Unable to assess ROS because ________    OBJECTIVE:  ICU Vital Signs Last 24 Hrs  T(C): 36.5 (07 Oct 2022 06:30), Max: 36.5 (07 Oct 2022 06:30)  T(F): 97.7 (07 Oct 2022 06:30), Max: 97.7 (07 Oct 2022 06:30)  HR: 80 (07 Oct 2022 06:30) (65 - 99)  BP: 162/90 (07 Oct 2022 06:30) (133/61 - 162/90)  BP(mean): --  ABP: --  ABP(mean): --  RR: 19 (07 Oct 2022 06:30) (18 - 20)  SpO2: 100% (07 Oct 2022 06:30) (95% - 100%)    O2 Parameters below as of 07 Oct 2022 06:30  Patient On (Oxygen Delivery Method): room air          Mode: NIV (Noninvasive Ventilation), RR (machine): 12, TV (machine): 450, FiO2: 35, PEEP: 5, ITime: 1, P-High: 25, P-Low: 10, PIP: 18    10-06 @ 07:01  -  10-07 @ 07:00  --------------------------------------------------------  IN: 720 mL / OUT: 1100 mL / NET: -380 mL        PHYSICAL EXAM:  GENERAL: NAD, lethargic  HEAD:  Atraumatic, Normocephalic  EYES: EOMI, PERRLA, conjunctiva and sclera clear  ENMT: No tonsillar erythema, exudates, or enlargement; Moist mucous membranes, Good dentition, No lesions  NECK: Supple, No JVD, Normal thyroid  CHEST/LUNG: diffuse minimal rhonchi  HEART: Regular rate and rhythm; No murmurs, rubs, or gallops  ABDOMEN: Soft, Nontender, Nondistended; Bowel sounds present  VASCULAR:  2+ Peripheral Pulses, No clubbing, cyanosis, or edema  LYMPH: No lymphadenopathy noted  SKIN: No rashes or lesions  NERVOUS SYSTEM:  Alert & Oriented X3, Good concentration;    HOSPITAL MEDICATIONS:  MEDICATIONS  (STANDING):  acetaminophen     Tablet .. 500 milliGRAM(s) Oral every 6 hours  albuterol/ipratropium for Nebulization 3 milliLiter(s) Nebulizer every 6 hours  amLODIPine   Tablet 5 milliGRAM(s) Oral daily  artificial  tears Solution 1 Drop(s) Both EYES two times a day  bisacodyl 5 milliGRAM(s) Oral at bedtime  budesonide 160 MICROgram(s)/formoterol 4.5 MICROgram(s) Inhaler 2 Puff(s) Inhalation two times a day  colistimethate for Nebulization 150 milliGRAM(s) CBA Nebulizer every 12 hours  dextrose 5%. 1000 milliLiter(s) (100 mL/Hr) IV Continuous <Continuous>  dextrose 5%. 1000 milliLiter(s) (50 mL/Hr) IV Continuous <Continuous>  dextrose 50% Injectable 25 Gram(s) IV Push once  dextrose 50% Injectable 12.5 Gram(s) IV Push once  dextrose 50% Injectable 25 Gram(s) IV Push once  enoxaparin Injectable 30 milliGRAM(s) SubCutaneous every 24 hours  ferrous    sulfate 325 milliGRAM(s) Oral daily  fluticasone propionate 50 MICROgram(s)/spray Nasal Spray 1 Spray(s) Both Nostrils two times a day  glucagon  Injectable 1 milliGRAM(s) IntraMuscular once  influenza  Vaccine (HIGH DOSE) 0.7 milliLiter(s) IntraMuscular once  insulin glargine Injectable (LANTUS) 11 Unit(s) SubCutaneous at bedtime  insulin lispro (ADMELOG) corrective regimen sliding scale   SubCutaneous three times a day before meals  insulin lispro (ADMELOG) corrective regimen sliding scale   SubCutaneous at bedtime  insulin lispro Injectable (ADMELOG) 8 Unit(s) SubCutaneous three times a day before meals  lidocaine/prilocaine Cream 1 Application(s) Topical daily  metoprolol succinate ER 50 milliGRAM(s) Oral daily  montelukast 10 milliGRAM(s) Oral daily  multivitamin 1 Tablet(s) Oral daily  nystatin    Suspension 936512 Unit(s) Oral daily  pantoprazole    Tablet 40 milliGRAM(s) Oral before breakfast  piperacillin/tazobactam IVPB.. 3.375 Gram(s) IV Intermittent every 8 hours  polyethylene glycol 3350 17 Gram(s) Oral two times a day  predniSONE   Tablet 20 milliGRAM(s) Oral daily  tiotropium 18 MICROgram(s) Capsule 1 Capsule(s) Inhalation daily  venlafaxine XR. 37.5 milliGRAM(s) Oral daily    MEDICATIONS  (PRN):  ALBUTerol    90 MICROgram(s) HFA Inhaler 2 Puff(s) Inhalation every 6 hours PRN Shortness of Breath and/or Wheezing  dextrose Oral Gel 15 Gram(s) Oral once PRN Blood Glucose LESS THAN 70 milliGRAM(s)/deciliter  simethicone 80 milliGRAM(s) Chew four times a day PRN Gas Pain  sodium chloride 3%  Inhalation 4 milliLiter(s) Inhalation every 8 hours PRN with aerobica/airway clearance      LABS:    The Labs were reviewed by me   The Radiology was reviewed by me    EKG tracing reviewed by me    10-07    136  |  96<L>  |  16  ----------------------------<  172<H>  3.9   |  35<H>  |  0.28<L>  10-05    138  |  96<L>  |  16  ----------------------------<  148<H>  3.5   |  31  |  0.33<L>    Ca    9.2      07 Oct 2022 05:32  Ca    9.4      05 Oct 2022 06:21  Phos  2.9     10-07  Mg     2.00     10-07    TPro  6.1  /  Alb  3.0<L>  /  TBili  <0.2  /  DBili  <0.2  /  AST  93<H>  /  ALT  77<H>  /  AlkPhos  204<H>  10-07  TPro  6.2  /  Alb  3.2<L>  /  TBili  <0.2  /  DBili  x   /  AST  41<H>  /  ALT  37<H>  /  AlkPhos  194<H>  10-05    Magnesium, Serum: 2.00 mg/dL (10-07-22 @ 05:32)  Magnesium, Serum: 1.90 mg/dL (10-05-22 @ 06:21)    Phosphorus Level, Serum: 2.9 mg/dL (10-07-22 @ 05:32)  Phosphorus Level, Serum: 3.1 mg/dL (10-05-22 @ 06:21)                                              11.0   7.63  )-----------( 245      ( 07 Oct 2022 05:32 )             35.7                         12.1   9.70  )-----------( 222      ( 05 Oct 2022 06:21 )             38.0     CAPILLARY BLOOD GLUCOSE      POCT Blood Glucose.: 115 mg/dL (07 Oct 2022 07:16)  POCT Blood Glucose.: 239 mg/dL (07 Oct 2022 01:44)  POCT Blood Glucose.: 225 mg/dL (07 Oct 2022 01:35)  POCT Blood Glucose.: 90 mg/dL (07 Oct 2022 00:40)  POCT Blood Glucose.: 159 mg/dL (06 Oct 2022 21:54)  POCT Blood Glucose.: 350 mg/dL (06 Oct 2022 17:07)  POCT Blood Glucose.: 183 mg/dL (06 Oct 2022 11:57)        MICROBIOLOGY:     RADIOLOGY:  [ ] Reviewed and interpreted by me    Point of Care Ultrasound Findings:    PFT:    EKG:

## 2022-10-07 NOTE — PROGRESS NOTE ADULT - PROBLEM SELECTOR PLAN 1
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 Last settings documented at [facility]:  AVAPS RR 18, EPAP 5, Min IP 15, Max IP 25,   - duonebs q6h  -Started Prednisone as per Pulm   - Zosyn+ inhaled Colistin per ID recs, completed Zosyn today as per ID   - appreciate pulm and ID recs

## 2022-10-07 NOTE — PROGRESS NOTE ADULT - SUBJECTIVE AND OBJECTIVE BOX
Patient is a 67y old  Female who presents with a chief complaint of abdominal pain (07 Oct 2022 08:00)      SUBJECTIVE / OVERNIGHT EVENTS: Pt seen and examined with [facility] Video  Service ID [id number]. Feels better. No specific complaints, (+) BM     MEDICATIONS  (STANDING):  acetaminophen     Tablet .. 500 milliGRAM(s) Oral every 6 hours  albuterol/ipratropium for Nebulization 3 milliLiter(s) Nebulizer every 6 hours  amLODIPine   Tablet 5 milliGRAM(s) Oral daily  artificial  tears Solution 1 Drop(s) Both EYES two times a day  bisacodyl 5 milliGRAM(s) Oral at bedtime  budesonide 160 MICROgram(s)/formoterol 4.5 MICROgram(s) Inhaler 2 Puff(s) Inhalation two times a day  colistimethate for Nebulization 150 milliGRAM(s) CBA Nebulizer every 12 hours  dextrose 5%. 1000 milliLiter(s) (50 mL/Hr) IV Continuous <Continuous>  dextrose 5%. 1000 milliLiter(s) (100 mL/Hr) IV Continuous <Continuous>  dextrose 50% Injectable 25 Gram(s) IV Push once  dextrose 50% Injectable 12.5 Gram(s) IV Push once  dextrose 50% Injectable 25 Gram(s) IV Push once  enoxaparin Injectable 30 milliGRAM(s) SubCutaneous every 24 hours  ferrous    sulfate 325 milliGRAM(s) Oral daily  fluticasone propionate 50 MICROgram(s)/spray Nasal Spray 1 Spray(s) Both Nostrils two times a day  glucagon  Injectable 1 milliGRAM(s) IntraMuscular once  influenza  Vaccine (HIGH DOSE) 0.7 milliLiter(s) IntraMuscular once  insulin glargine Injectable (LANTUS) 11 Unit(s) SubCutaneous at bedtime  insulin lispro (ADMELOG) corrective regimen sliding scale   SubCutaneous three times a day before meals  insulin lispro (ADMELOG) corrective regimen sliding scale   SubCutaneous at bedtime  insulin lispro Injectable (ADMELOG) 10 Unit(s) SubCutaneous three times a day before meals  lidocaine/prilocaine Cream 1 Application(s) Topical daily  metoprolol succinate ER 50 milliGRAM(s) Oral daily  montelukast 10 milliGRAM(s) Oral daily  multivitamin 1 Tablet(s) Oral daily  nystatin    Suspension 133022 Unit(s) Oral daily  pantoprazole    Tablet 40 milliGRAM(s) Oral before breakfast  piperacillin/tazobactam IVPB.. 3.375 Gram(s) IV Intermittent every 8 hours  polyethylene glycol 3350 17 Gram(s) Oral two times a day  predniSONE   Tablet 20 milliGRAM(s) Oral daily  tiotropium 18 MICROgram(s) Capsule 1 Capsule(s) Inhalation daily  venlafaxine XR. 37.5 milliGRAM(s) Oral daily    MEDICATIONS  (PRN):  ALBUTerol    90 MICROgram(s) HFA Inhaler 2 Puff(s) Inhalation every 6 hours PRN Shortness of Breath and/or Wheezing  dextrose Oral Gel 15 Gram(s) Oral once PRN Blood Glucose LESS THAN 70 milliGRAM(s)/deciliter  simethicone 80 milliGRAM(s) Chew four times a day PRN Gas Pain  sodium chloride 3%  Inhalation 4 milliLiter(s) Inhalation every 8 hours PRN with aerobica/airway clearance      Vital Signs Last 24 Hrs  T(C): 36.6 (07 Oct 2022 10:14), Max: 36.6 (07 Oct 2022 10:14)  T(F): 97.9 (07 Oct 2022 10:14), Max: 97.9 (07 Oct 2022 10:14)  HR: 81 (07 Oct 2022 10:14) (65 - 94)  BP: 142/62 (07 Oct 2022 10:14) (133/61 - 162/90)  BP(mean): --  RR: 19 (07 Oct 2022 10:14) (18 - 19)  SpO2: 99% (07 Oct 2022 10:14) (97% - 100%)    Parameters below as of 07 Oct 2022 10:14  Patient On (Oxygen Delivery Method): room air      CAPILLARY BLOOD GLUCOSE      POCT Blood Glucose.: 130 mg/dL (07 Oct 2022 12:07)  POCT Blood Glucose.: 115 mg/dL (07 Oct 2022 07:16)  POCT Blood Glucose.: 239 mg/dL (07 Oct 2022 01:44)  POCT Blood Glucose.: 225 mg/dL (07 Oct 2022 01:35)  POCT Blood Glucose.: 90 mg/dL (07 Oct 2022 00:40)  POCT Blood Glucose.: 159 mg/dL (06 Oct 2022 21:54)  POCT Blood Glucose.: 350 mg/dL (06 Oct 2022 17:07)    I&O's Summary    06 Oct 2022 07:01  -  07 Oct 2022 07:00  --------------------------------------------------------  IN: 720 mL / OUT: 1600 mL / NET: -880 mL        PHYSICAL EXAM:  GENERAL: NAD, well-developed  HEAD:  Atraumatic, Normocephalic  EYES: EOMI, PERRLA, conjunctiva and sclera clear  NECK: Supple, No JVD  CHEST/LUNG: (+) scattered rhonchi bilaterally; No wheeze  HEART: Regular rate and rhythm; No murmurs, rubs, or gallops  ABDOMEN: Soft, minimally tender at epigastric region, Nondistended; Bowel sounds present  EXTREMITIES:  2+ Peripheral Pulses, No clubbing, cyanosis, or edema  PSYCH: AAOx3  NEUROLOGY: non-focal  SKIN: No rashes or lesions    LABS:                        11.0   7.63  )-----------( 245      ( 07 Oct 2022 05:32 )             35.7     10-07    136  |  96<L>  |  16  ----------------------------<  172<H>  3.9   |  35<H>  |  0.28<L>    Ca    9.2      07 Oct 2022 05:32  Phos  2.9     10-07  Mg     2.00     10-07    TPro  6.1  /  Alb  3.0<L>  /  TBili  <0.2  /  DBili  <0.2  /  AST  93<H>  /  ALT  77<H>  /  AlkPhos  204<H>  10-07              RADIOLOGY & ADDITIONAL TESTS:    Imaging Personally Reviewed:    Consultant(s) Notes Reviewed:      Care Discussed with Consultants/Other Providers:

## 2022-10-07 NOTE — PROGRESS NOTE ADULT - ASSESSMENT
67F w/ Primary Ciliary Dyskinesia, Chronic hyoxemic/hypercapneic Respiratory Failure on 2-3L Home O2 NC secondary to Bronchiectasis on AVAPS, Cirrhosis, Prior CBD stricture s/p dilation, stent placement and removal, HCV Ab+, presented to the ED for abdominal pain found to have punctate CBD stones. Pulmonary consulted for co-management of bronchiectasis. RVP negative. Pt follows with Dr. Young as outpt.     #Bronchiectasis Exacerbation  #Chronic hypoxemic and hypercapnic respiratory Failure    Multiple sputum cultures in the past with pseudomonas aeruginosa (sensitive to geoff and zosyn in 8/23/2022), klebsiella and elizabethkingia meningoseptica.  She has tried both inhaled amikacin and tobramycin in the past and was unable to tolerate it due to nausea and tremulousness.   Outpt workup for cystic fibrosis negative.   Aspergillus igg negative in the past.  sputum culture growing PsA    Recommendations:  c/w prednisone 20mg qd 10/3  Continue home inhalers - albuterol, spiriva, symbicort  C/w home airway clearance regimen: hypersal, aerobica, chest vest  Chest PT  ID Consult for abx co-management: switched back to zosyn, + c/w inhaled colisitin  c/w home O2   c/w NIPPV qhs at her home settings. Last settings documented at [facility]:  AVAPS RR 18, EPAP 5, Min IP 15, Max IP 25,   discussed with Dr. Uriostegui and primary team        67F w/ Primary Ciliary Dyskinesia, Chronic hyoxemic/hypercapneic Respiratory Failure on 2-3L Home O2 NC secondary to Bronchiectasis on AVAPS, Cirrhosis, Prior CBD stricture s/p dilation, stent placement and removal, HCV Ab+, presented to the ED for abdominal pain found to have punctate CBD stones. Pulmonary consulted for co-management of bronchiectasis. RVP negative. Pt follows with Dr. Young as outpt.     #Bronchiectasis Exacerbation  #Chronic hypoxemic and hypercapnic respiratory Failure    Multiple sputum cultures in the past with pseudomonas aeruginosa (sensitive to geoff and zosyn in 8/23/2022), klebsiella and elizabethkingia meningoseptica.  She has tried both inhaled amikacin and tobramycin in the past and was unable to tolerate it due to nausea and tremulousness.   Outpt workup for cystic fibrosis negative.   Aspergillus igg negative in the past.  sputum culture growing PsA    Recommendations:  c/w prednisone 20mg qd 10/3 decrease by 10 q 4 days, until off   Continue home inhalers - albuterol, spiriva, symbicort  C/w home airway clearance regimen: hypersal, aerobica, chest vest  Chest PT  ID Consult completed course of abx zosyn and inhaled colistin  c/w home O2   c/w NIPPV qhs at her home settings. Last settings documented at [facility]:  AVAPS RR 18, EPAP 5, Min IP 15, Max IP 25,   discussed with Dr. Uriostegui and primary team     Jose Chandra MD  [facility] PGY4  [facility] [address], [facility] [phone]     67F w/ Primary Ciliary Dyskinesia, Chronic hyoxemic/hypercapneic Respiratory Failure on 2-3L Home O2 NC secondary to Bronchiectasis on AVAPS, Cirrhosis, Prior CBD stricture s/p dilation, stent placement and removal, HCV Ab+, presented to the ED for abdominal pain found to have punctate CBD stones. Pulmonary consulted for co-management of bronchiectasis. RVP negative. Pt follows with Dr. Young as outpt.     #Bronchiectasis Exacerbation  #Chronic hypoxemic and hypercapnic respiratory Failure    Multiple sputum cultures in the past with pseudomonas aeruginosa (sensitive to geoff and zosyn in 2022), klebsiella and elizabethkingia meningoseptica.  She has tried both inhaled amikacin and tobramycin in the past and was unable to tolerate it due to nausea and tremulousness.   Outpt workup for cystic fibrosis negative.   Aspergillus igg negative in the past.  sputum culture growing PsA    Recommendations:  c/w prednisone 20mg qd 10/3 decrease by 10 q 4 days, until off   Continue home inhalers - albuterol, spiriva, symbicort  C/w home airway clearance regimen: hypersal, aerobica, chest vest  Chest PT  ID Consult completed course of abx zosyn and inhaled colistin  c/w home O2   c/w NIPPV qhs at her home settings. Last settings documented at [facility]:  AVAPS RR 18, EPAP 5, Min IP 15, Max IP 25,   discussed with Dr. Uriostegui and primary team     Prior to discharge:  Please email: [email or web address] to setup an appointment prior to discharge. Include the patient's name, , MRN and contact information in the email.      and specify f/u with Dr. Young    Pulmonary/Sleep Clinic  [address] [place] [address]  [phone]      Jose Chandra MD  PCC PGY4  [facility] [address], [facility] [phone]

## 2022-10-07 NOTE — PROGRESS NOTE ADULT - ATTENDING COMMENTS
67 year old female with primary ciliary dyskinesia, bronchiectasis, and chronic hypoxemic and hypercapnic respiratory on AVAPS who presents with abdominal pain and increased sputum production on treatment for acute exacerbation of her bronchiectasis.   Antibiotics to be completed today.  Prednisone taper as above.  Discharge planning  Outpatient follow up with Dr. Young on discharge.

## 2022-10-07 NOTE — PROGRESS NOTE ADULT - TIME BILLING
Counseling and coordinating care
chart and data review, clinical assessment, coordination of care
patient care
chart and data review, clinical assessment, coordination of care
patient care, imaging review

## 2022-10-07 NOTE — PROGRESS NOTE ADULT - PROBLEM SELECTOR PLAN 3
# constipation possibly 2/2 PCD, improved with Laxatives   - Miralax, senna, bisacodyl, simethicone po; stool count    # early satiety - possibly 2/2 bloating/constipation, though malignancy possible  - bowel reg as above    Monitor BM

## 2022-10-07 NOTE — PROGRESS NOTE ADULT - SUBJECTIVE AND OBJECTIVE BOX
Chief Complaint: Type 2 Diabetes Mellitus, poorly controlled    History: [facility] id : [id number]. Pt seen at bedside. Pt tolerating oral diet. Pt denies nausea and vomiting/any signs of hypoglycemia. Pt reports an adequate appetite.     MEDICATIONS  (STANDING):  acetaminophen     Tablet .. 500 milliGRAM(s) Oral every 6 hours  albuterol/ipratropium for Nebulization 3 milliLiter(s) Nebulizer every 6 hours  amLODIPine   Tablet 5 milliGRAM(s) Oral daily  artificial  tears Solution 1 Drop(s) Both EYES two times a day  bisacodyl 5 milliGRAM(s) Oral at bedtime  budesonide 160 MICROgram(s)/formoterol 4.5 MICROgram(s) Inhaler 2 Puff(s) Inhalation two times a day  colistimethate for Nebulization 150 milliGRAM(s) CBA Nebulizer every 12 hours  dextrose 5%. 1000 milliLiter(s) (50 mL/Hr) IV Continuous <Continuous>  dextrose 5%. 1000 milliLiter(s) (100 mL/Hr) IV Continuous <Continuous>  dextrose 50% Injectable 25 Gram(s) IV Push once  dextrose 50% Injectable 12.5 Gram(s) IV Push once  dextrose 50% Injectable 25 Gram(s) IV Push once  enoxaparin Injectable 30 milliGRAM(s) SubCutaneous every 24 hours  ferrous    sulfate 325 milliGRAM(s) Oral daily  fluticasone propionate 50 MICROgram(s)/spray Nasal Spray 1 Spray(s) Both Nostrils two times a day  glucagon  Injectable 1 milliGRAM(s) IntraMuscular once  influenza  Vaccine (HIGH DOSE) 0.7 milliLiter(s) IntraMuscular once  insulin glargine Injectable (LANTUS) 11 Unit(s) SubCutaneous at bedtime  insulin lispro (ADMELOG) corrective regimen sliding scale   SubCutaneous three times a day before meals  insulin lispro (ADMELOG) corrective regimen sliding scale   SubCutaneous at bedtime  insulin lispro Injectable (ADMELOG) 10 Unit(s) SubCutaneous three times a day before meals  lidocaine/prilocaine Cream 1 Application(s) Topical daily  metoprolol succinate ER 50 milliGRAM(s) Oral daily  montelukast 10 milliGRAM(s) Oral daily  multivitamin 1 Tablet(s) Oral daily  nystatin    Suspension 176483 Unit(s) Oral daily  pantoprazole    Tablet 40 milliGRAM(s) Oral before breakfast  piperacillin/tazobactam IVPB.. 3.375 Gram(s) IV Intermittent every 8 hours  polyethylene glycol 3350 17 Gram(s) Oral two times a day  predniSONE   Tablet 20 milliGRAM(s) Oral daily  tiotropium 18 MICROgram(s) Capsule 1 Capsule(s) Inhalation daily  venlafaxine XR. 37.5 milliGRAM(s) Oral daily    MEDICATIONS  (PRN):  ALBUTerol    90 MICROgram(s) HFA Inhaler 2 Puff(s) Inhalation every 6 hours PRN Shortness of Breath and/or Wheezing  dextrose Oral Gel 15 Gram(s) Oral once PRN Blood Glucose LESS THAN 70 milliGRAM(s)/deciliter  simethicone 80 milliGRAM(s) Chew four times a day PRN Gas Pain  sodium chloride 3%  Inhalation 4 milliLiter(s) Inhalation every 8 hours PRN with aerobica/airway clearance      Allergies: No Known Allergies    Review of Systems:  Respiratory: No SOB, no cough  GI: No nausea, vomiting, abdominal pain  Endocrine: no polyuria, polydipsia    PHYSICAL EXAM:  VITALS: T(C): 36.6 (10-07-22 @ 10:14)  T(F): 97.9 (10-07-22 @ 10:14), Max: 97.9 (10-07-22 @ 10:14)  HR: 81 (10-07-22 @ 10:14) (65 - 94)  BP: 142/62 (10-07-22 @ 10:14) (133/61 - 162/90)  RR:  (18 - 19)  SpO2:  (97% - 100%)  Wt(kg): --  GENERAL: NAD, well-groomed, well-developed  RESPIRATORY: No labored breathing   GI: Soft, nontender, non distended  PSYCH: Alert and oriented x 3, normal affect, normal mood      CAPILLARY BLOOD GLUCOSE  POCT Blood Glucose.: 130 mg/dL (07 Oct 2022 12:07)  POCT Blood Glucose.: 115 mg/dL (07 Oct 2022 07:16)  POCT Blood Glucose.: 239 mg/dL (07 Oct 2022 01:44)  POCT Blood Glucose.: 225 mg/dL (07 Oct 2022 01:35)  POCT Blood Glucose.: 90 mg/dL (07 Oct 2022 00:40)  POCT Blood Glucose.: 159 mg/dL (06 Oct 2022 21:54)  POCT Blood Glucose.: 350 mg/dL (06 Oct 2022 17:07)    A1C with Estimated Average Glucose (09.29.22 @ 05:00)    A1C with Estimated Average Glucose Result: 9.4    10-07    136  |  96<L>  |  16  ----------------------------<  172<H>  3.9   |  35<H>  |  0.28<L>    eGFR: 120    Ca    9.2      10-07  Mg     2.00     10-07  Phos  2.9     10-07    TPro  6.1  /  Alb  3.0<L>  /  TBili  <0.2  /  DBili  <0.2  /  AST  93<H>  /  ALT  77<H>  /  AlkPhos  204<H>  10-07    Diet, Consistent Carbohydrate/No Snacks:   Fiber/Residue Restricted (LOWFIBER)  DASH/TLC Sodium & Cholesterol Restricted (DASH)  No Beef  No Pork  Supplement Feeding Modality:  Oral  Glucerna Shake Cans or Servings Per Day:  2       Frequency:  Daily (10-04-22 @ 17:52) [Active]

## 2022-10-08 ENCOUNTER — TRANSCRIPTION ENCOUNTER (OUTPATIENT)
Age: 67
End: 2022-10-08

## 2022-10-08 LAB
ALBUMIN SERPL ELPH-MCNC: 3.1 G/DL — LOW (ref 3.3–5)
ALP SERPL-CCNC: 225 U/L — HIGH (ref 40–120)
ALT FLD-CCNC: 111 U/L — HIGH (ref 4–33)
ANION GAP SERPL CALC-SCNC: 7 MMOL/L — SIGNIFICANT CHANGE UP (ref 7–14)
APPEARANCE UR: CLEAR — SIGNIFICANT CHANGE UP
AST SERPL-CCNC: 107 U/L — HIGH (ref 4–32)
BACTERIA # UR AUTO: NEGATIVE — SIGNIFICANT CHANGE UP
BILIRUB SERPL-MCNC: <0.2 MG/DL — SIGNIFICANT CHANGE UP (ref 0.2–1.2)
BILIRUB UR-MCNC: NEGATIVE — SIGNIFICANT CHANGE UP
BUN SERPL-MCNC: 14 MG/DL — SIGNIFICANT CHANGE UP (ref 7–23)
CALCIUM SERPL-MCNC: 9.4 MG/DL — SIGNIFICANT CHANGE UP (ref 8.4–10.5)
CHLORIDE SERPL-SCNC: 96 MMOL/L — LOW (ref 98–107)
CO2 SERPL-SCNC: 34 MMOL/L — HIGH (ref 22–31)
COLOR SPEC: YELLOW — SIGNIFICANT CHANGE UP
CREAT SERPL-MCNC: 0.36 MG/DL — LOW (ref 0.5–1.3)
DIFF PNL FLD: ABNORMAL
EGFR: 113 ML/MIN/1.73M2 — SIGNIFICANT CHANGE UP
EPI CELLS # UR: 1 /HPF — SIGNIFICANT CHANGE UP (ref 0–5)
GLUCOSE BLDC GLUCOMTR-MCNC: 124 MG/DL — HIGH (ref 70–99)
GLUCOSE BLDC GLUCOMTR-MCNC: 162 MG/DL — HIGH (ref 70–99)
GLUCOSE BLDC GLUCOMTR-MCNC: 172 MG/DL — HIGH (ref 70–99)
GLUCOSE BLDC GLUCOMTR-MCNC: 245 MG/DL — HIGH (ref 70–99)
GLUCOSE SERPL-MCNC: 170 MG/DL — HIGH (ref 70–99)
GLUCOSE UR QL: ABNORMAL
HCT VFR BLD CALC: 38.9 % — SIGNIFICANT CHANGE UP (ref 34.5–45)
HGB BLD-MCNC: 12.1 G/DL — SIGNIFICANT CHANGE UP (ref 11.5–15.5)
KETONES UR-MCNC: NEGATIVE — SIGNIFICANT CHANGE UP
LEUKOCYTE ESTERASE UR-ACNC: ABNORMAL
MAGNESIUM SERPL-MCNC: 1.9 MG/DL — SIGNIFICANT CHANGE UP (ref 1.6–2.6)
MCHC RBC-ENTMCNC: 27.9 PG — SIGNIFICANT CHANGE UP (ref 27–34)
MCHC RBC-ENTMCNC: 31.1 GM/DL — LOW (ref 32–36)
MCV RBC AUTO: 89.6 FL — SIGNIFICANT CHANGE UP (ref 80–100)
NITRITE UR-MCNC: NEGATIVE — SIGNIFICANT CHANGE UP
NRBC # BLD: 0 /100 WBCS — SIGNIFICANT CHANGE UP (ref 0–0)
NRBC # FLD: 0 K/UL — SIGNIFICANT CHANGE UP (ref 0–0)
PH UR: 6.5 — SIGNIFICANT CHANGE UP (ref 5–8)
PHOSPHATE SERPL-MCNC: 2.9 MG/DL — SIGNIFICANT CHANGE UP (ref 2.5–4.5)
PLATELET # BLD AUTO: 247 K/UL — SIGNIFICANT CHANGE UP (ref 150–400)
POTASSIUM SERPL-MCNC: 4.2 MMOL/L — SIGNIFICANT CHANGE UP (ref 3.5–5.3)
POTASSIUM SERPL-SCNC: 4.2 MMOL/L — SIGNIFICANT CHANGE UP (ref 3.5–5.3)
PROT SERPL-MCNC: 5.7 G/DL — LOW (ref 6–8.3)
PROT UR-MCNC: ABNORMAL
RBC # BLD: 4.34 M/UL — SIGNIFICANT CHANGE UP (ref 3.8–5.2)
RBC # FLD: 13.6 % — SIGNIFICANT CHANGE UP (ref 10.3–14.5)
RBC CASTS # UR COMP ASSIST: SIGNIFICANT CHANGE UP /HPF (ref 0–4)
SODIUM SERPL-SCNC: 137 MMOL/L — SIGNIFICANT CHANGE UP (ref 135–145)
SP GR SPEC: 1.02 — SIGNIFICANT CHANGE UP (ref 1.01–1.05)
UROBILINOGEN FLD QL: SIGNIFICANT CHANGE UP
WBC # BLD: 8.86 K/UL — SIGNIFICANT CHANGE UP (ref 3.8–10.5)
WBC # FLD AUTO: 8.86 K/UL — SIGNIFICANT CHANGE UP (ref 3.8–10.5)
WBC UR QL: 9 /HPF — HIGH (ref 0–5)

## 2022-10-08 PROCEDURE — 99233 SBSQ HOSP IP/OBS HIGH 50: CPT

## 2022-10-08 RX ORDER — CEFTRIAXONE 500 MG/1
1000 INJECTION, POWDER, FOR SOLUTION INTRAMUSCULAR; INTRAVENOUS EVERY 24 HOURS
Refills: 0 | Status: DISCONTINUED | OUTPATIENT
Start: 2022-10-08 | End: 2022-10-09

## 2022-10-08 RX ORDER — HYDROCORTISONE 1 %
1 OINTMENT (GRAM) TOPICAL ONCE
Refills: 0 | Status: COMPLETED | OUTPATIENT
Start: 2022-10-08 | End: 2022-10-08

## 2022-10-08 RX ORDER — NYSTATIN/TRIAMCINOLONE ACET
1 OINTMENT (GRAM) TOPICAL ONCE
Refills: 0 | Status: DISCONTINUED | OUTPATIENT
Start: 2022-10-08 | End: 2022-10-08

## 2022-10-08 RX ORDER — PSYLLIUM SEED (WITH DEXTROSE)
1 POWDER (GRAM) ORAL DAILY
Refills: 0 | Status: DISCONTINUED | OUTPATIENT
Start: 2022-10-08 | End: 2022-10-10

## 2022-10-08 RX ADMIN — Medication 10 UNIT(S): at 08:13

## 2022-10-08 RX ADMIN — Medication 1 DROP(S): at 06:03

## 2022-10-08 RX ADMIN — ENOXAPARIN SODIUM 30 MILLIGRAM(S): 100 INJECTION SUBCUTANEOUS at 12:25

## 2022-10-08 RX ADMIN — BUDESONIDE AND FORMOTEROL FUMARATE DIHYDRATE 2 PUFF(S): 160; 4.5 AEROSOL RESPIRATORY (INHALATION) at 22:00

## 2022-10-08 RX ADMIN — INSULIN GLARGINE 11 UNIT(S): 100 INJECTION, SOLUTION SUBCUTANEOUS at 22:00

## 2022-10-08 RX ADMIN — Medication 1 SPRAY(S): at 17:26

## 2022-10-08 RX ADMIN — Medication 325 MILLIGRAM(S): at 12:24

## 2022-10-08 RX ADMIN — Medication 1 DROP(S): at 17:26

## 2022-10-08 RX ADMIN — Medication 50 MILLIGRAM(S): at 06:01

## 2022-10-08 RX ADMIN — Medication 10 UNIT(S): at 17:27

## 2022-10-08 RX ADMIN — Medication 4: at 12:23

## 2022-10-08 RX ADMIN — Medication 500 MILLIGRAM(S): at 12:26

## 2022-10-08 RX ADMIN — Medication 3 MILLILITER(S): at 23:15

## 2022-10-08 RX ADMIN — COLISTIMETHATE SODIUM 150 MILLIGRAM(S) CBA: 150 INJECTION INTRAMUSCULAR; INTRAVENOUS at 09:48

## 2022-10-08 RX ADMIN — POLYETHYLENE GLYCOL 3350 17 GRAM(S): 17 POWDER, FOR SOLUTION ORAL at 17:27

## 2022-10-08 RX ADMIN — PIPERACILLIN AND TAZOBACTAM 25 GRAM(S): 4; .5 INJECTION, POWDER, LYOPHILIZED, FOR SOLUTION INTRAVENOUS at 06:34

## 2022-10-08 RX ADMIN — TIOTROPIUM BROMIDE 1 CAPSULE(S): 18 CAPSULE ORAL; RESPIRATORY (INHALATION) at 10:34

## 2022-10-08 RX ADMIN — Medication 200 MILLIGRAM(S): at 12:24

## 2022-10-08 RX ADMIN — PANTOPRAZOLE SODIUM 40 MILLIGRAM(S): 20 TABLET, DELAYED RELEASE ORAL at 06:35

## 2022-10-08 RX ADMIN — Medication 3 MILLILITER(S): at 09:43

## 2022-10-08 RX ADMIN — POLYETHYLENE GLYCOL 3350 17 GRAM(S): 17 POWDER, FOR SOLUTION ORAL at 06:02

## 2022-10-08 RX ADMIN — AMLODIPINE BESYLATE 5 MILLIGRAM(S): 2.5 TABLET ORAL at 06:02

## 2022-10-08 RX ADMIN — Medication 37.5 MILLIGRAM(S): at 12:26

## 2022-10-08 RX ADMIN — Medication 10 MILLIGRAM(S): at 06:05

## 2022-10-08 RX ADMIN — Medication 500 MILLIGRAM(S): at 01:00

## 2022-10-08 RX ADMIN — Medication 3 MILLILITER(S): at 17:02

## 2022-10-08 RX ADMIN — CEFTRIAXONE 100 MILLIGRAM(S): 500 INJECTION, POWDER, FOR SOLUTION INTRAMUSCULAR; INTRAVENOUS at 19:12

## 2022-10-08 RX ADMIN — Medication 1 TABLET(S): at 12:26

## 2022-10-08 RX ADMIN — LIDOCAINE AND PRILOCAINE CREAM 1 APPLICATION(S): 25; 25 CREAM TOPICAL at 12:27

## 2022-10-08 RX ADMIN — Medication 10 UNIT(S): at 12:24

## 2022-10-08 RX ADMIN — Medication 5 MILLIGRAM(S): at 22:00

## 2022-10-08 RX ADMIN — MONTELUKAST 10 MILLIGRAM(S): 4 TABLET, CHEWABLE ORAL at 12:25

## 2022-10-08 RX ADMIN — Medication 2: at 08:12

## 2022-10-08 RX ADMIN — Medication 3 MILLILITER(S): at 05:36

## 2022-10-08 RX ADMIN — Medication 1 SPRAY(S): at 06:02

## 2022-10-08 RX ADMIN — Medication 1 APPLICATION(S): at 17:36

## 2022-10-08 RX ADMIN — Medication 1 PACKET(S): at 17:24

## 2022-10-08 RX ADMIN — Medication 500 MILLIGRAM(S): at 06:01

## 2022-10-08 RX ADMIN — Medication 500 MILLIGRAM(S): at 17:23

## 2022-10-08 RX ADMIN — Medication 500000 UNIT(S): at 12:25

## 2022-10-08 RX ADMIN — BUDESONIDE AND FORMOTEROL FUMARATE DIHYDRATE 2 PUFF(S): 160; 4.5 AEROSOL RESPIRATORY (INHALATION) at 10:32

## 2022-10-08 RX ADMIN — Medication 500 MILLIGRAM(S): at 07:30

## 2022-10-08 RX ADMIN — Medication 500 MILLIGRAM(S): at 07:05

## 2022-10-08 NOTE — PROGRESS NOTE ADULT - PROBLEM SELECTOR PLAN 1
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 Last settings documented at [facility]:  AVAPS RR 18, EPAP 5, Min IP 15, Max IP 25,   - duonebs q6h  -Started Prednisone as per Pulm   - Zosyn+ inhaled Colistin per ID recs, completed Zosyn today as per ID   - appreciate pulm

## 2022-10-08 NOTE — DISCHARGE NOTE PROVIDER - PROVIDER TOKENS
FREE:[LAST:[[facility]PHONE:[phone]],FAX:[(   )    -],ADDRESS:[[facility] [address]]],PROVIDER:[TOKEN:[[id number]]] PROVIDER:[TOKEN:[[id number]]],FREE:[LAST:[[facility]],PHONE:[phone]],FAX:[(   )    -],ADDRESS:[[address] [address]]],PROVIDER:[TOKEN:[[id number]]]

## 2022-10-08 NOTE — PROGRESS NOTE ADULT - SUBJECTIVE AND OBJECTIVE BOX
Patient is a 67y old  Female who presents with a chief complaint of abdominal pain (08 Oct 2022 10:02)      SUBJECTIVE / OVERNIGHT EVENTS: Only c/o bloated abd. No SOB. Passed TOV. Daughter in law reported pt can not walk appropriately.      MEDICATIONS  (STANDING):  acetaminophen     Tablet .. 500 milliGRAM(s) Oral every 6 hours  albuterol/ipratropium for Nebulization 3 milliLiter(s) Nebulizer every 6 hours  amLODIPine   Tablet 5 milliGRAM(s) Oral daily  artificial  tears Solution 1 Drop(s) Both EYES two times a day  bisacodyl 5 milliGRAM(s) Oral at bedtime  budesonide 160 MICROgram(s)/formoterol 4.5 MICROgram(s) Inhaler 2 Puff(s) Inhalation two times a day  colistimethate for Nebulization 150 milliGRAM(s) CBA Nebulizer every 12 hours  dextrose 5%. 1000 milliLiter(s) (50 mL/Hr) IV Continuous <Continuous>  dextrose 5%. 1000 milliLiter(s) (100 mL/Hr) IV Continuous <Continuous>  dextrose 50% Injectable 25 Gram(s) IV Push once  dextrose 50% Injectable 12.5 Gram(s) IV Push once  dextrose 50% Injectable 25 Gram(s) IV Push once  enoxaparin Injectable 30 milliGRAM(s) SubCutaneous every 24 hours  ferrous    sulfate 325 milliGRAM(s) Oral daily  fluticasone propionate 50 MICROgram(s)/spray Nasal Spray 1 Spray(s) Both Nostrils two times a day  glucagon  Injectable 1 milliGRAM(s) IntraMuscular once  influenza  Vaccine (HIGH DOSE) 0.7 milliLiter(s) IntraMuscular once  insulin glargine Injectable (LANTUS) 11 Unit(s) SubCutaneous at bedtime  insulin lispro (ADMELOG) corrective regimen sliding scale   SubCutaneous three times a day before meals  insulin lispro (ADMELOG) corrective regimen sliding scale   SubCutaneous at bedtime  insulin lispro Injectable (ADMELOG) 10 Unit(s) SubCutaneous three times a day before meals  lidocaine/prilocaine Cream 1 Application(s) Topical daily  metoprolol succinate ER 50 milliGRAM(s) Oral daily  montelukast 10 milliGRAM(s) Oral daily  multivitamin 1 Tablet(s) Oral daily  nystatin    Suspension 425799 Unit(s) Oral daily  pantoprazole    Tablet 40 milliGRAM(s) Oral before breakfast  piperacillin/tazobactam IVPB.. 3.375 Gram(s) IV Intermittent every 8 hours  polyethylene glycol 3350 17 Gram(s) Oral two times a day  predniSONE   Tablet 10 milliGRAM(s) Oral daily  tiotropium 18 MICROgram(s) Capsule 1 Capsule(s) Inhalation daily  venlafaxine XR. 37.5 milliGRAM(s) Oral daily    MEDICATIONS  (PRN):  ALBUTerol    90 MICROgram(s) HFA Inhaler 2 Puff(s) Inhalation every 6 hours PRN Shortness of Breath and/or Wheezing  dextrose Oral Gel 15 Gram(s) Oral once PRN Blood Glucose LESS THAN 70 milliGRAM(s)/deciliter  guaiFENesin Oral Liquid (Sugar-Free) 200 milliGRAM(s) Oral every 6 hours PRN Cough  simethicone 80 milliGRAM(s) Chew four times a day PRN Gas Pain  sodium chloride 3%  Inhalation 4 milliLiter(s) Inhalation every 8 hours PRN with aerobica/airway clearance      Vital Signs Last 24 Hrs  T(C): 36.6 (08 Oct 2022 10:43), Max: 36.9 (07 Oct 2022 16:53)  T(F): 97.8 (08 Oct 2022 10:43), Max: 98.5 (07 Oct 2022 16:53)  HR: 95 (08 Oct 2022 10:43) (80 - 98)  BP: 151/78 (08 Oct 2022 10:43) (137/66 - 169/84)  BP(mean): --  RR: 18 (08 Oct 2022 10:43) (17 - 19)  SpO2: 99% (08 Oct 2022 10:43) (92% - 100%)    Parameters below as of 08 Oct 2022 10:43  Patient On (Oxygen Delivery Method): nasal cannula      CAPILLARY BLOOD GLUCOSE      POCT Blood Glucose.: 245 mg/dL (08 Oct 2022 12:02)  POCT Blood Glucose.: 172 mg/dL (08 Oct 2022 07:43)  POCT Blood Glucose.: 118 mg/dL (07 Oct 2022 21:38)  POCT Blood Glucose.: 135 mg/dL (07 Oct 2022 17:36)  POCT Blood Glucose.: 175 mg/dL (07 Oct 2022 16:29)    I&O's Summary    07 Oct 2022 07:01  -  08 Oct 2022 07:00  --------------------------------------------------------  IN: 0 mL / OUT: 750 mL / NET: -750 mL    08 Oct 2022 07:01  -  08 Oct 2022 12:51  --------------------------------------------------------  IN: 0 mL / OUT: 200 mL / NET: -200 mL        PHYSICAL EXAM:  GENERAL: NAD, well-developed  HEAD:  Atraumatic, Normocephalic  EYES: EOMI, PERRLA, conjunctiva and sclera clear  NECK: Supple, No JVD  CHEST/LUNG: (+) a few crackles at bases bilaterally much improved; No wheeze  HEART: Regular rate and rhythm; No murmurs, rubs, or gallops  ABDOMEN: Soft, minimally tender, Nondistended; Bowel sounds present  EXTREMITIES:  2+ Peripheral Pulses, No clubbing, cyanosis, or edema  PSYCH: AAOx3  NEUROLOGY: non-focal  SKIN: No rashes or lesions    LABS:                        12.1   8.86  )-----------( 247      ( 08 Oct 2022 05:57 )             38.9     10-08    137  |  96<L>  |  14  ----------------------------<  170<H>  4.2   |  34<H>  |  0.36<L>    Ca    9.4      08 Oct 2022 05:57  Phos  2.9     10-08  Mg     1.90     10-08    TPro  5.7<L>  /  Alb  3.1<L>  /  TBili  <0.2  /  DBili  x   /  AST  107<H>  /  ALT  111<H>  /  AlkPhos  225<H>  10-08              RADIOLOGY & ADDITIONAL TESTS:    Imaging Personally Reviewed:    Consultant(s) Notes Reviewed:  Endo, Pulm     Care Discussed with Consultants/Other Providers:

## 2022-10-08 NOTE — CHART NOTE - NSCHARTNOTEFT_GEN_A_CORE
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  Endocrinology consulted for assistance with management of type 2 diabetes.    CAPILLARY BLOOD GLUCOSE  POCT Blood Glucose.: 172 mg/dL (08 Oct 2022 07:43)  POCT Blood Glucose.: 118 mg/dL (07 Oct 2022 21:38)  POCT Blood Glucose.: 135 mg/dL (07 Oct 2022 17:36)  POCT Blood Glucose.: 175 mg/dL (07 Oct 2022 16:29)  POCT Blood Glucose.: 130 mg/dL (07 Oct 2022 12:07)      1. Type 2 Diabetes Mellitus, poorly controlled  HbA1c: 9.4%  Home Regimen: Lantus 15u HS, Ademlog 5-11 units before meals  Suspect that her BG fluctuate at home with varying doses of steroids    While inpatient:  BG target 140-180 mg/dl  Continue Lantus 11 units SQ qHS  Continue Admelog to 10 units SQ TID before meals (Hold if NPO/skips meal)   Continue Admelog moderate dose correctional scale before meals (while on steroids), low dose at bedtime  Check BG before meals and bedtime  Consistent carbohydrate diet  Hypoglycemia protocol   Provider to RN ; assess husbands insulin pen administration technique     Discharge Plan:  Patient is on Lantus and Admelog at home.  Therefore continue this as follows with consideration of steroid:   Patient on prednisone 10 mg x 3 more days  - Lantus 10 units sq qhs  - Admelog 8 units sq tid ac- hold if skips meal  - low dose admelog scale before meals  Once steroids are complete  - Continue Lantus 10 units qhs  - Decrease Admelog to 6 units tid ac  - Continue low dose admelog scale before meals  Ensure patient has insulin pens as above, glucometer, glucose test strips, lancets, alcohol swabs and insulin PEN needles  Followup with [facility] [place]: [id number] [facility] [address], [phone]   administers insulin via insulin pen       Brynn Gottlieb  Nurse Practitioner  Division of Endocrinology & Diabetes  Pager # [phone]      If after 6PM or before 9AM, or on weekends/holidays, please call endocrine answering service for assistance ([phone]).  For nonurgent matters email [email or web address] for assistance.

## 2022-10-08 NOTE — DISCHARGE NOTE PROVIDER - NSDCFUSCHEDAPPT_GEN_ALL_CORE_FT
Shelli Young  [facility]  [address]  Scheduled Appointment: 10/18/2022

## 2022-10-08 NOTE — DISCHARGE NOTE PROVIDER - CARE PROVIDERS DIRECT ADDRESSES
,DirectAddress_Unknown,[email or web address],[email or web address],[email or web address]

## 2022-10-08 NOTE — DISCHARGE NOTE PROVIDER - HOSPITAL COURSE
67F with PMH primary ciliary dyskinesia (PCD) and sequelae, including chronic hypoxemic hypercapnic respiratory failure (on home O2 at 2-3L NC and AVAPS at night), recurrent admissions for bronchiectasis/COPD exacerbations and pneumonia, MDR Pseudomonas colonization, constipation, cirrhosis, T2DM on insulin, GERD; anxiety, depression, PSH remote cholecystectomy c/b strictures with remote PTC/PTBD/stent, presenting with 2-3 weeks of diffuse abdominal pain worst in epigastric region, found to also have bronchiectasis exacerbation possibly 2/2 infection, finished course of zosyn.     Bronchiectasis with acute exacerbation.    - likely 2/2 PCD c/b infection  - Hx MDR Pseudomonas aeruginosa; colonized  - sputum Cx, Gram stain: moderate PMN, numerous GNR, rare GPC in pairs  - consulted pulmonary and recs: Continue home inhalers - albuterol, spiriva, symbicort  - C/w home airway clearance regimen: hypersal, aerobica  - c/w home O2 - target O2sat 92  - c/w NIPPV qhs at her home settings. Last settings documented at [facility]:  AVAPS RR 18, EPAP 5, Min IP 15, Max IP 25,   - duonebs q6h  -Started Prednisone as per Pulm   - Zosyn+ inhaled Colistin per ID recs, completed Zosyn as per ID   - appreciate pulm and ID recs.    Abdominal pain.   - episodic abdominal pain. Abd exam is benign. constipation, much improved   -Cont to monitor   -Cont Bowel regimen      Constipation.   - constipation possibly 2/2 PCD, improved with Laxatives   - Miralax, senna, bisacodyl, simethicone po; stool count     Biliary colic.   - possibly bilary colic 2/2 or choledocholithiasis 2/2 PCD  - f/u MRCP, no stone in CBD, but a stone in the distal preampullary; contact ed GI re: MRCP results. No intervention as per GI rec.       Diabetes mellitus.   - possibly 2/2 PCD  - labile blood glucoses  - Lantus 6U, premeal 2U, proceed with caution monitoring frequently   - insulin sliding scale  - A1c 9.4%  - endo consulted for outpt recs.      Cirrhosis.   - possibly 2/2 PCD  - trend LFTs  - avoid >2g acetaminophen daily.    On 10/08/22, case was discussed with Dr. Guerra, patient is medically cleared and optimized for discharge today. All medications were reviewed with attending, and sent to mutually agreed upon pharmacy.   67F with PMH primary ciliary dyskinesia (PCD) and sequelae, including chronic hypoxemic hypercapnic respiratory failure (on home O2 at 2-3L NC and AVAPS at night), recurrent admissions for bronchiectasis/COPD exacerbations and pneumonia, MDR Pseudomonas colonization, constipation, cirrhosis, T2DM on insulin, GERD; anxiety, depression, PSH remote cholecystectomy c/b strictures with remote PTC/PTBD/stent, presenting with 2-3 weeks of diffuse abdominal pain worst in epigastric region, found to also have bronchiectasis exacerbation possibly 2/2 infection, finished course of zosyn.     Bronchiectasis with acute exacerbation.    - likely 2/2 PCD c/b infection  - Hx MDR Pseudomonas aeruginosa; colonized  - sputum Cx, Gram stain: moderate PMN, numerous GNR, rare GPC in pairs  - consulted pulmonary and recs: Continue home inhalers - albuterol, spiriva, symbicort  - C/w home airway clearance regimen: hypersal, aerobica  - c/w home O2 - target O2sat 92  - c/w NIPPV qhs at her home settings. Last settings documented at [facility]:  AVAPS RR 18, EPAP 5, Min IP 15, Max IP 25,   - duonebs q6h  -Started Prednisone as per Pulm to complete course on 10/11  - Zosyn+ inhaled Colistin per ID recs, completed Zosyn as per ID   - appreciate pulm and ID recs.      Constipation.   - constipation possibly 2/2 PCD, improved with Laxatives   - Miralax, senna, bisacodyl, simethicone po; stool count     Biliary colic.   - possibly bilary colic 2/2 or choledocholithiasis 2/2 PCD  -  no stone in CBD, but a stone in the distal preampullary; contact ed GI re: MRCP results. No intervention as per GI rec. Can have a outpatient ERCP when respiratory status improves     Choledocholithiasis - appears to be unchanged based on review of previous imaging. Bilirubin normal, low suspicion for obstruction. Patient will benefit from non-urgent ERCP, which should be deferred until patient is optimized from a respiratory standpoint. Would plan for outpatient follow up and outpatient ECRP.         Diabetes mellitus.   - possibly 2/2 PCD  - labile blood glucoses  - insulin sliding scale  - A1c 9.4%  - endo consulted, insulin dosing per recommendations     Cirrhosis.   - possibly 2/2 PCD  - trend LFTs  - avoid >2g acetaminophen daily.    On 10/10/22, case was discussed with Dr. Lomeli, patient is medically cleared and optimized for discharge today. All medications were reviewed with attending, and sent to mutually agreed upon pharmacy.

## 2022-10-08 NOTE — DISCHARGE NOTE PROVIDER - CARE PROVIDER_API CALL
[facility],   [address] [facility] [address]  Phone: [phone]  Fax: (   )    -  Follow Up Time:     Shelli Young)  Critical Care Medicine; Pulmonary Disease  [address] [place] [address], [place] [address]  Phone: [phone]  Fax: [phone]  Follow Up Time:    Shelli Young)  Critical Care Medicine; Pulmonary Disease  [address] [facility], [address]  [place] [address]  Phone: [phone]  Fax: [phone]  Follow Up Time:     [facility],   [address] [facility] [address]  Phone: [phone]  Fax: (   )    -  Follow Up Time:     Rancho Chaidez)  Gastroenterology; Internal Medicine  [address] [place] [address], [address]  [place] [address]  Phone: [phone]  Fax: [phone]  Follow Up Time:

## 2022-10-08 NOTE — DISCHARGE NOTE PROVIDER - NSDCCPCAREPLAN_GEN_ALL_CORE_FT
PRINCIPAL DISCHARGE DIAGNOSIS  Diagnosis: Bronchiectasis with acute exacerbation  Assessment and Plan of Treatment: You came to the hospital due to epigastric pain caused by acute exacerbation due to bronchiectasis.      SECONDARY DISCHARGE DIAGNOSES  Diagnosis: Shortness of breath  Assessment and Plan of Treatment:      PRINCIPAL DISCHARGE DIAGNOSIS  Diagnosis: Bronchiectasis with acute exacerbation  Assessment and Plan of Treatment: You were found to have an acute exacerbation of your bronchiectasis and were started on Prednisone to complete course on 10/11. Continue your nebulizer treatments. Follow up with your Pulmonologist in 1-2 weeks      SECONDARY DISCHARGE DIAGNOSES  Diagnosis: Biliary colic  Assessment and Plan of Treatment: possibly 2/2 to gallstone, please follow up with Gastroentereologist once your respiratory status has improved for a ERCP ( a procedure to remove the stone)    Diagnosis: Type 2 diabetes mellitus, with long-term current use of insulin  Assessment and Plan of Treatment: Your insulin regimen was slightly changed while in hospital please take lantus 8 units at bedtime and Admelog 7 units before meals.  Monitor finger sticks pre-meal and bedtime, low salt, fat and carbohydrate diet, minimize glucose intake.  Follow up with primary care physician and endocrinologist for routine Hemoglobin A1C checks and management.  Follow up with your ophthalmologist for routine yearly vision exams.      Diagnosis: Hypertension  Assessment and Plan of Treatment: Low sodium and fat diet, continue anti-hypertensive medications, and follow up with primary care physician.      Diagnosis: Choledocholithiasis  Assessment and Plan of Treatment: please follow up with Gastroentereologist once your respiratory status has improved for a ERCP ( a procedure to remove the stone)

## 2022-10-08 NOTE — PROGRESS NOTE ADULT - PROBLEM SELECTOR PLAN 3
# constipation possibly 2/2 PCD, improved with Laxatives   - Miralax, senna, bisacodyl, simethicone po; stool count    # early satiety - possibly 2/2 bloating/constipation, though malignancy possible  - bowel reg as above    # Bloating, add fiber supplement     Monitor BM

## 2022-10-08 NOTE — DISCHARGE NOTE PROVIDER - NSDCMRMEDTOKEN_GEN_ALL_CORE_FT
Admelog SoloStar 100 units/mL injectable solution: 7 unit(s) injectable 3 times a day (before meals) (depending on reading).  ALBUTEROL SULFATE (2.5 MG/3ML)0.083% NEBULIZER: USE 1 UNIT DOSE EVERY 4-6 HOURS AS NEEDED FOR WHEEZING .  AMLODIPINE BESYLATE 5MG TABLET: TAKE 1 TABLET (5 MG) BY MOUTH DAILY FOR HIGH BLOOD PRESSURE  bisacodyl 5 mg oral delayed release tablet: 1 tab(s) orally once daily as needed  Calcium 600+D 600 mg-5 mcg (200 intl units) oral tablet: 1 tab(s) orally 2 times a day  CYANOCOBALAMIN 1000MCG/ML SOLUTION: ADMINISTER 1 MILLILITER (1,000 MCG) SUBCUTANEOUS EVERY 7 DAYS  Effexor XR 37.5 mg oral capsule, extended release: 1 cap(s) orally once a day (Last dispensed July/2022 x 1 month).   famotidine 40 mg oral tablet: 1 tab(s) orally once a day (at bedtime)  FERROUS GLUCONATE 324 (38 FE)MG TABLET: TAKE 1 TABLET BY MOUTH 2 TIMES PER DAY BETWEEN MEALS IN WATER OR JUICE  Flonase 50 mcg/inh nasal spray: 1 spray(s) in each nostril once a day as needed  GNP GAS RELIEF 80 MG ORAL TABLET CHEWABLE: TAKE 1 TABLET BY MOUTH 4 TIMES PER DAY AFTER MEALS AND AT BEDTIME AS NEEDED FOR GAS  HYDROCORTISONE 2.5% CREAM: APPLY RECTALLY TWICE A DAY (Dispensed 7/29/22 x 15 day supply)  ketotifen 0.025% ophthalmic solution: 1 drop(s) to each affected eye 2 times a day as needed  Lantus 100 units/mL subcutaneous solution: Per family: 10-15 unit(s) subcutaneous once a day (at bedtime)  LIDOCAINE/PRILOCAINE 2.5-2.5% CRM: APPLY RECTALLY TWICE A DAY as needed  LORAZEPAM 2 MG ORAL TABLET: TAKE 1 TABLET (2 MG) BY MOUTH DAILY AT BEDTIME AS NEEDED FOR TROUBLE SLEEPING (Dispensed 9/20/22 x #30 tabs).   losartan 50 mg oral tablet: 1 tab(s) orally once a day  MELOXICAM 7.5MG TABLET: TAKE 1 TABLET (7.5 MG) BY MOUTH DAILY  metoprolol succinate 50 mg oral tablet, extended release: 1 tab(s) orally once a day  MONTELUKAST SODIUM 10MG TABLET: TAKE 1 TABLET (10 MG) BY MOUTH DAILY  nystatin 100,000 units/mL oral suspension: 5 milliliter(s) orally 4 times a day  OMEPRAZOLE-SODIUM BICARBONATE 40-1680MG PACK: 1 PACKET ORALLY DAILY MIXED WITH 5 TO 10 ML OF WATER ON AN EMPTY STOMACH  ProAir HFA 90 mcg/inh inhalation aerosol: 2 puff(s) inhaled every 6 hours, As Needed  sennosides-docusate 8.6 mg-50 mg oral tablet: 2 tab(s) orally once a day (at bedtime), As Needed  Spiriva 18 mcg inhalation capsule: 1 cap(s) inhaled once a day  Symbicort 160 mcg-4.5 mcg/inh inhalation aerosol: 2 puff(s) inhaled 2 times a day   Admelog SoloStar 100 units/mL injectable solution: 7 unit(s) injectable 3 times a day (before meals) (depending on reading).  ALBUTEROL SULFATE (2.5 MG/3ML)0.083% NEBULIZER: USE 1 UNIT DOSE EVERY 4-6 HOURS AS NEEDED FOR WHEEZING .  AMLODIPINE BESYLATE 5MG TABLET: TAKE 1 TABLET (5 MG) BY MOUTH DAILY FOR HIGH BLOOD PRESSURE  bisacodyl 5 mg oral delayed release tablet: 1 tab(s) orally once daily as needed  Calcium 600+D 600 mg-5 mcg (200 intl units) oral tablet: 1 tab(s) orally 2 times a day  CYANOCOBALAMIN 1000MCG/ML SOLUTION: ADMINISTER 1 MILLILITER (1,000 MCG) SUBCUTANEOUS EVERY 7 DAYS  Effexor XR 37.5 mg oral capsule, extended release: 1 cap(s) orally once a day (Last dispensed July/2022 x 1 month).   FERROUS GLUCONATE 324 (38 FE)MG TABLET: TAKE 1 TABLET BY MOUTH 2 TIMES PER DAY BETWEEN MEALS IN WATER OR JUICE  Flonase 50 mcg/inh nasal spray: 1 spray(s) in each nostril once a day as needed  GNP GAS RELIEF 80 MG ORAL TABLET CHEWABLE: TAKE 1 TABLET BY MOUTH 4 TIMES PER DAY AFTER MEALS AND AT BEDTIME AS NEEDED FOR GAS  insulin glargine 100 units/mL subcutaneous solution: 8 unit(s) subcutaneous once a day (at bedtime)  ketotifen 0.025% ophthalmic solution: 1 drop(s) to each affected eye 2 times a day as needed  LIDOCAINE/PRILOCAINE 2.5-2.5% CRM: APPLY RECTALLY TWICE A DAY as needed  metoprolol succinate 50 mg oral tablet, extended release: 1 tab(s) orally once a day  MONTELUKAST SODIUM 10MG TABLET: TAKE 1 TABLET (10 MG) BY MOUTH DAILY  Multiple Vitamins oral tablet: 1 tab(s) orally once a day  nystatin 100,000 units/mL oral suspension: 5 milliliter(s) orally 4 times a day  OMEPRAZOLE-SODIUM BICARBONATE 40-1680MG PACK: 1 PACKET ORALLY DAILY MIXED WITH 5 TO 10 ML OF WATER ON AN EMPTY STOMACH  predniSONE 10 mg oral tablet: 1 tab(s) orally once a day x 1 more day   ProAir HFA 90 mcg/inh inhalation aerosol: 2 puff(s) inhaled every 6 hours, As Needed  sennosides-docusate 8.6 mg-50 mg oral tablet: 2 tab(s) orally once a day (at bedtime), As Needed  Spiriva 18 mcg inhalation capsule: 1 cap(s) inhaled once a day  Symbicort 160 mcg-4.5 mcg/inh inhalation aerosol: 2 puff(s) inhaled 2 times a day   Admelog SoloStar 100 units/mL injectable solution: 7 unit(s) injectable 3 times a day (before meals) (depending on reading).  ALBUTEROL SULFATE (2.5 MG/3ML)0.083% NEBULIZER: USE 1 UNIT DOSE EVERY 4-6 HOURS AS NEEDED FOR WHEEZING .  AMLODIPINE BESYLATE 5MG TABLET: TAKE 1 TABLET (5 MG) BY MOUTH DAILY FOR HIGH BLOOD PRESSURE  bisacodyl 5 mg oral delayed release tablet: 1 tab(s) orally once daily as needed  Calcium 600+D 600 mg-5 mcg (200 intl units) oral tablet: 1 tab(s) orally 2 times a day  CYANOCOBALAMIN 1000MCG/ML SOLUTION: ADMINISTER 1 MILLILITER (1,000 MCG) SUBCUTANEOUS EVERY 7 DAYS  Effexor XR 37.5 mg oral capsule, extended release: 1 cap(s) orally once a day (Last dispensed July/2022 x 1 month).   FERROUS GLUCONATE 324 (38 FE)MG TABLET: TAKE 1 TABLET BY MOUTH 2 TIMES PER DAY BETWEEN MEALS IN WATER OR JUICE  Flonase 50 mcg/inh nasal spray: 1 spray(s) in each nostril once a day as needed  GNP GAS RELIEF 80 MG ORAL TABLET CHEWABLE: TAKE 1 TABLET BY MOUTH 4 TIMES PER DAY AFTER MEALS AND AT BEDTIME AS NEEDED FOR GAS  insulin glargine 100 units/mL subcutaneous solution: 8 unit(s) subcutaneous once a day (at bedtime)  ketotifen 0.025% ophthalmic solution: 1 drop(s) to each affected eye 2 times a day as needed  LIDOCAINE/PRILOCAINE 2.5-2.5% CRM: APPLY RECTALLY TWICE A DAY as needed  metoprolol succinate 50 mg oral tablet, extended release: 1 tab(s) orally once a day  MONTELUKAST SODIUM 10MG TABLET: TAKE 1 TABLET (10 MG) BY MOUTH DAILY  Multiple Vitamins oral tablet: 1 tab(s) orally once a day  nystatin 100,000 units/mL oral suspension: 5 milliliter(s) orally 4 times a day  OMEPRAZOLE-SODIUM BICARBONATE 40-1680MG PACK: 1 PACKET ORALLY DAILY MIXED WITH 5 TO 10 ML OF WATER ON AN EMPTY STOMACH  outpatient physical therapy 3 x a week for 6 weeks:   predniSONE 10 mg oral tablet: 1 tab(s) orally once a day x 1 more day   ProAir HFA 90 mcg/inh inhalation aerosol: 2 puff(s) inhaled every 6 hours, As Needed  sennosides-docusate 8.6 mg-50 mg oral tablet: 2 tab(s) orally once a day (at bedtime), As Needed  Spiriva 18 mcg inhalation capsule: 1 cap(s) inhaled once a day  Symbicort 160 mcg-4.5 mcg/inh inhalation aerosol: 2 puff(s) inhaled 2 times a day

## 2022-10-08 NOTE — PROGRESS NOTE ADULT - ASSESSMENT
67F with PMH primary ciliary dyskinesia (PCD) and sequelae, including chronic hypoxemic hypercapnic respiratory failure (on home O2 at 2-3L NC and AVAPS at night), recurrent admissions for bronchiectasis/COPD exacerbations and pneumonia, MDR Pseudomonas colonization, constipation, cirrhosis, T2DM on insulin, GERD; anxiety, depression, PSH remote cholecystectomy c/b strictures with remote PTC/PTBD/stent, presenting with 2-3 weeks of diffuse abdominal pain worst in epigastric region, found to also have bronchiectasis exacerbation possibly 2/2 infection, s/p  Zosyn.

## 2022-10-09 DIAGNOSIS — R74.8 ABNORMAL LEVELS OF OTHER SERUM ENZYMES: ICD-10-CM

## 2022-10-09 LAB
ALBUMIN SERPL ELPH-MCNC: 3.1 G/DL — LOW (ref 3.3–5)
ALP SERPL-CCNC: 231 U/L — HIGH (ref 40–120)
ALT FLD-CCNC: 113 U/L — HIGH (ref 4–33)
ANION GAP SERPL CALC-SCNC: 7 MMOL/L — SIGNIFICANT CHANGE UP (ref 7–14)
AST SERPL-CCNC: 104 U/L — HIGH (ref 4–32)
BILIRUB SERPL-MCNC: <0.2 MG/DL — SIGNIFICANT CHANGE UP (ref 0.2–1.2)
BUN SERPL-MCNC: 18 MG/DL — SIGNIFICANT CHANGE UP (ref 7–23)
CALCIUM SERPL-MCNC: 9.5 MG/DL — SIGNIFICANT CHANGE UP (ref 8.4–10.5)
CHLORIDE SERPL-SCNC: 97 MMOL/L — LOW (ref 98–107)
CO2 SERPL-SCNC: 32 MMOL/L — HIGH (ref 22–31)
CREAT SERPL-MCNC: 0.35 MG/DL — LOW (ref 0.5–1.3)
EGFR: 113 ML/MIN/1.73M2 — SIGNIFICANT CHANGE UP
GLUCOSE BLDC GLUCOMTR-MCNC: 120 MG/DL — HIGH (ref 70–99)
GLUCOSE BLDC GLUCOMTR-MCNC: 128 MG/DL — HIGH (ref 70–99)
GLUCOSE BLDC GLUCOMTR-MCNC: 150 MG/DL — HIGH (ref 70–99)
GLUCOSE BLDC GLUCOMTR-MCNC: 188 MG/DL — HIGH (ref 70–99)
GLUCOSE BLDC GLUCOMTR-MCNC: 245 MG/DL — HIGH (ref 70–99)
GLUCOSE BLDC GLUCOMTR-MCNC: 65 MG/DL — LOW (ref 70–99)
GLUCOSE BLDC GLUCOMTR-MCNC: 68 MG/DL — LOW (ref 70–99)
GLUCOSE BLDC GLUCOMTR-MCNC: 81 MG/DL — SIGNIFICANT CHANGE UP (ref 70–99)
GLUCOSE BLDC GLUCOMTR-MCNC: 88 MG/DL — SIGNIFICANT CHANGE UP (ref 70–99)
GLUCOSE BLDC GLUCOMTR-MCNC: 99 MG/DL — SIGNIFICANT CHANGE UP (ref 70–99)
GLUCOSE SERPL-MCNC: 170 MG/DL — HIGH (ref 70–99)
HAV IGM SER-ACNC: SIGNIFICANT CHANGE UP
HBV CORE IGM SER-ACNC: SIGNIFICANT CHANGE UP
HBV SURFACE AG SER-ACNC: SIGNIFICANT CHANGE UP
HCT VFR BLD CALC: 36.9 % — SIGNIFICANT CHANGE UP (ref 34.5–45)
HCV AB S/CO SERPL IA: 0.92 S/CO — SIGNIFICANT CHANGE UP (ref 0–0.99)
HCV AB SERPL-IMP: SIGNIFICANT CHANGE UP
HGB BLD-MCNC: 11.4 G/DL — LOW (ref 11.5–15.5)
MAGNESIUM SERPL-MCNC: 1.9 MG/DL — SIGNIFICANT CHANGE UP (ref 1.6–2.6)
MCHC RBC-ENTMCNC: 27.5 PG — SIGNIFICANT CHANGE UP (ref 27–34)
MCHC RBC-ENTMCNC: 30.9 GM/DL — LOW (ref 32–36)
MCV RBC AUTO: 89.1 FL — SIGNIFICANT CHANGE UP (ref 80–100)
NRBC # BLD: 0 /100 WBCS — SIGNIFICANT CHANGE UP (ref 0–0)
NRBC # FLD: 0 K/UL — SIGNIFICANT CHANGE UP (ref 0–0)
PHOSPHATE SERPL-MCNC: 3 MG/DL — SIGNIFICANT CHANGE UP (ref 2.5–4.5)
PLATELET # BLD AUTO: 270 K/UL — SIGNIFICANT CHANGE UP (ref 150–400)
POTASSIUM SERPL-MCNC: 3.8 MMOL/L — SIGNIFICANT CHANGE UP (ref 3.5–5.3)
POTASSIUM SERPL-SCNC: 3.8 MMOL/L — SIGNIFICANT CHANGE UP (ref 3.5–5.3)
PROT SERPL-MCNC: 5.9 G/DL — LOW (ref 6–8.3)
RBC # BLD: 4.14 M/UL — SIGNIFICANT CHANGE UP (ref 3.8–5.2)
RBC # FLD: 13.6 % — SIGNIFICANT CHANGE UP (ref 10.3–14.5)
SODIUM SERPL-SCNC: 136 MMOL/L — SIGNIFICANT CHANGE UP (ref 135–145)
WBC # BLD: 10.7 K/UL — HIGH (ref 3.8–10.5)
WBC # FLD AUTO: 10.7 K/UL — HIGH (ref 3.8–10.5)

## 2022-10-09 PROCEDURE — 99233 SBSQ HOSP IP/OBS HIGH 50: CPT

## 2022-10-09 RX ADMIN — Medication 1 PACKET(S): at 12:49

## 2022-10-09 RX ADMIN — COLISTIMETHATE SODIUM 150 MILLIGRAM(S) CBA: 150 INJECTION INTRAMUSCULAR; INTRAVENOUS at 11:32

## 2022-10-09 RX ADMIN — Medication 3 MILLILITER(S): at 11:22

## 2022-10-09 RX ADMIN — SODIUM CHLORIDE 4 MILLILITER(S): 9 INJECTION INTRAMUSCULAR; INTRAVENOUS; SUBCUTANEOUS at 11:23

## 2022-10-09 RX ADMIN — MONTELUKAST 10 MILLIGRAM(S): 4 TABLET, CHEWABLE ORAL at 12:48

## 2022-10-09 RX ADMIN — Medication 10 MILLIGRAM(S): at 07:36

## 2022-10-09 RX ADMIN — Medication 10 UNIT(S): at 17:36

## 2022-10-09 RX ADMIN — AMLODIPINE BESYLATE 5 MILLIGRAM(S): 2.5 TABLET ORAL at 07:36

## 2022-10-09 RX ADMIN — PANTOPRAZOLE SODIUM 40 MILLIGRAM(S): 20 TABLET, DELAYED RELEASE ORAL at 07:37

## 2022-10-09 RX ADMIN — BUDESONIDE AND FORMOTEROL FUMARATE DIHYDRATE 2 PUFF(S): 160; 4.5 AEROSOL RESPIRATORY (INHALATION) at 09:29

## 2022-10-09 RX ADMIN — Medication 325 MILLIGRAM(S): at 15:21

## 2022-10-09 RX ADMIN — Medication 1 DROP(S): at 17:37

## 2022-10-09 RX ADMIN — Medication 10 UNIT(S): at 08:24

## 2022-10-09 RX ADMIN — Medication 10 UNIT(S): at 12:43

## 2022-10-09 RX ADMIN — Medication 5 MILLIGRAM(S): at 22:32

## 2022-10-09 RX ADMIN — Medication 3 MILLILITER(S): at 05:49

## 2022-10-09 RX ADMIN — Medication 1 DROP(S): at 07:35

## 2022-10-09 RX ADMIN — COLISTIMETHATE SODIUM 150 MILLIGRAM(S) CBA: 150 INJECTION INTRAMUSCULAR; INTRAVENOUS at 22:41

## 2022-10-09 RX ADMIN — POLYETHYLENE GLYCOL 3350 17 GRAM(S): 17 POWDER, FOR SOLUTION ORAL at 07:37

## 2022-10-09 RX ADMIN — INSULIN GLARGINE 11 UNIT(S): 100 INJECTION, SOLUTION SUBCUTANEOUS at 22:40

## 2022-10-09 RX ADMIN — Medication 500000 UNIT(S): at 12:50

## 2022-10-09 RX ADMIN — Medication 1 TABLET(S): at 12:49

## 2022-10-09 RX ADMIN — Medication 4: at 17:34

## 2022-10-09 RX ADMIN — Medication 3 MILLILITER(S): at 22:41

## 2022-10-09 RX ADMIN — Medication 500 MILLIGRAM(S): at 17:37

## 2022-10-09 RX ADMIN — Medication 500 MILLIGRAM(S): at 22:40

## 2022-10-09 RX ADMIN — LIDOCAINE AND PRILOCAINE CREAM 1 APPLICATION(S): 25; 25 CREAM TOPICAL at 12:21

## 2022-10-09 RX ADMIN — Medication 1 SPRAY(S): at 17:37

## 2022-10-09 RX ADMIN — Medication 50 MILLIGRAM(S): at 07:36

## 2022-10-09 RX ADMIN — ENOXAPARIN SODIUM 30 MILLIGRAM(S): 100 INJECTION SUBCUTANEOUS at 13:13

## 2022-10-09 RX ADMIN — Medication 500 MILLIGRAM(S): at 07:36

## 2022-10-09 RX ADMIN — Medication 1 SPRAY(S): at 07:35

## 2022-10-09 RX ADMIN — POLYETHYLENE GLYCOL 3350 17 GRAM(S): 17 POWDER, FOR SOLUTION ORAL at 17:38

## 2022-10-09 RX ADMIN — TIOTROPIUM BROMIDE 1 CAPSULE(S): 18 CAPSULE ORAL; RESPIRATORY (INHALATION) at 09:31

## 2022-10-09 RX ADMIN — Medication 37.5 MILLIGRAM(S): at 12:50

## 2022-10-09 RX ADMIN — BUDESONIDE AND FORMOTEROL FUMARATE DIHYDRATE 2 PUFF(S): 160; 4.5 AEROSOL RESPIRATORY (INHALATION) at 22:31

## 2022-10-09 NOTE — PROGRESS NOTE ADULT - NSPROGADDITIONALINFOA_GEN_ALL_CORE
Spoke to son at length about plan of care and discharge planning. Advised son that pt is medically stable and likely going home with home PT tomorrow. Will try to ask PT to work with pt today and tomorrow to improve pt's functional status in preparation for discharge home with home PT. Son understood and agreed with the plan.    D/W ACP
D/W ACP

## 2022-10-09 NOTE — PROGRESS NOTE ADULT - PROBLEM SELECTOR PLAN 1
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 Last settings documented at [facility]:  AVAPS RR 18, EPAP 5, Min IP 15, Max IP 25,   - duonebs q6h  -Started Prednisone as per Pulm   - Zosyn+ inhaled Colistin per ID recs, completed Zosyn yesterday  as per ID   - F/u pulm rec.  re: Thick mucus

## 2022-10-09 NOTE — PROGRESS NOTE ADULT - SUBJECTIVE AND OBJECTIVE BOX
Patient is a 67y old  Female who presents with a chief complaint of abdominal pain (08 Oct 2022 12:51)      SUBJECTIVE / OVERNIGHT EVENTS: Only c/o thick mucus, no SOB.     MEDICATIONS  (STANDING):  acetaminophen     Tablet .. 500 milliGRAM(s) Oral every 6 hours  albuterol/ipratropium for Nebulization 3 milliLiter(s) Nebulizer every 6 hours  amLODIPine   Tablet 5 milliGRAM(s) Oral daily  artificial  tears Solution 1 Drop(s) Both EYES two times a day  bisacodyl 5 milliGRAM(s) Oral at bedtime  budesonide 160 MICROgram(s)/formoterol 4.5 MICROgram(s) Inhaler 2 Puff(s) Inhalation two times a day  cefTRIAXone   IVPB 1000 milliGRAM(s) IV Intermittent every 24 hours  colistimethate for Nebulization 150 milliGRAM(s) CBA Nebulizer every 12 hours  dextrose 5%. 1000 milliLiter(s) (50 mL/Hr) IV Continuous <Continuous>  dextrose 5%. 1000 milliLiter(s) (100 mL/Hr) IV Continuous <Continuous>  dextrose 50% Injectable 25 Gram(s) IV Push once  dextrose 50% Injectable 12.5 Gram(s) IV Push once  dextrose 50% Injectable 25 Gram(s) IV Push once  enoxaparin Injectable 30 milliGRAM(s) SubCutaneous every 24 hours  ferrous    sulfate 325 milliGRAM(s) Oral daily  fluticasone propionate 50 MICROgram(s)/spray Nasal Spray 1 Spray(s) Both Nostrils two times a day  glucagon  Injectable 1 milliGRAM(s) IntraMuscular once  influenza  Vaccine (HIGH DOSE) 0.7 milliLiter(s) IntraMuscular once  insulin glargine Injectable (LANTUS) 11 Unit(s) SubCutaneous at bedtime  insulin lispro (ADMELOG) corrective regimen sliding scale   SubCutaneous at bedtime  insulin lispro (ADMELOG) corrective regimen sliding scale   SubCutaneous three times a day before meals  insulin lispro Injectable (ADMELOG) 10 Unit(s) SubCutaneous three times a day before meals  lidocaine/prilocaine Cream 1 Application(s) Topical daily  metoprolol succinate ER 50 milliGRAM(s) Oral daily  montelukast 10 milliGRAM(s) Oral daily  multivitamin 1 Tablet(s) Oral daily  nystatin    Suspension 573936 Unit(s) Oral daily  pantoprazole    Tablet 40 milliGRAM(s) Oral before breakfast  polyethylene glycol 3350 17 Gram(s) Oral two times a day  predniSONE   Tablet 10 milliGRAM(s) Oral daily  psyllium Powder 1 Packet(s) Oral daily  tiotropium 18 MICROgram(s) Capsule 1 Capsule(s) Inhalation daily  venlafaxine XR. 37.5 milliGRAM(s) Oral daily    MEDICATIONS  (PRN):  ALBUTerol    90 MICROgram(s) HFA Inhaler 2 Puff(s) Inhalation every 6 hours PRN Shortness of Breath and/or Wheezing  dextrose Oral Gel 15 Gram(s) Oral once PRN Blood Glucose LESS THAN 70 milliGRAM(s)/deciliter  guaiFENesin Oral Liquid (Sugar-Free) 200 milliGRAM(s) Oral every 6 hours PRN Cough  simethicone 80 milliGRAM(s) Chew four times a day PRN Gas Pain  sodium chloride 3%  Inhalation 4 milliLiter(s) Inhalation every 8 hours PRN with aerobica/airway clearance      Vital Signs Last 24 Hrs  T(C): 36.4 (09 Oct 2022 10:07), Max: 36.6 (08 Oct 2022 22:06)  T(F): 97.6 (09 Oct 2022 10:07), Max: 97.9 (08 Oct 2022 22:06)  HR: 90 (09 Oct 2022 11:35) (81 - 94)  BP: 135/82 (09 Oct 2022 10:07) (135/82 - 168/83)  BP(mean): --  RR: 18 (09 Oct 2022 10:07) (18 - 20)  SpO2: 99% (09 Oct 2022 11:35) (95% - 100%)    Parameters below as of 09 Oct 2022 11:33  Patient On (Oxygen Delivery Method): room air      CAPILLARY BLOOD GLUCOSE      POCT Blood Glucose.: 128 mg/dL (09 Oct 2022 12:14)  POCT Blood Glucose.: 150 mg/dL (09 Oct 2022 07:25)  POCT Blood Glucose.: 162 mg/dL (08 Oct 2022 21:41)  POCT Blood Glucose.: 124 mg/dL (08 Oct 2022 17:02)    I&O's Summary    08 Oct 2022 07:01  -  09 Oct 2022 07:00  --------------------------------------------------------  IN: 0 mL / OUT: 200 mL / NET: -200 mL    09 Oct 2022 07:01  -  09 Oct 2022 14:02  --------------------------------------------------------  IN: 240 mL / OUT: 0 mL / NET: 240 mL        PHYSICAL EXAM:  GENERAL: NAD, well-developed  HEAD:  Atraumatic, Normocephalic  EYES: EOMI, PERRLA, conjunctiva and sclera clear  NECK: Supple, No JVD  CHEST/LUNG: (+) scattered rhonchi at bases bilaterally; No wheeze  HEART: Regular rate and rhythm; No murmurs, rubs, or gallops  ABDOMEN: Soft, minimally tender, Nondistended; Bowel sounds present  EXTREMITIES:  2+ Peripheral Pulses, No clubbing, cyanosis, or edema  PSYCH: AAOx3  NEUROLOGY: non-focal  SKIN: No rashes or lesions    LABS:                        11.4   10.70 )-----------( 270      ( 09 Oct 2022 06:20 )             36.9     10-09    136  |  97<L>  |  18  ----------------------------<  170<H>  3.8   |  32<H>  |  0.35<L>    Ca    9.5      09 Oct 2022 06:20  Phos  3.0     10-09  Mg     1.90     10-09    TPro  5.9<L>  /  Alb  3.1<L>  /  TBili  <0.2  /  DBili  x   /  AST  104<H>  /  ALT  113<H>  /  AlkPhos  231<H>  10-09          Urinalysis Basic - ( 08 Oct 2022 17:11 )    Color: Yellow / Appearance: Clear / S.016 / pH: x  Gluc: x / Ketone: Negative  / Bili: Negative / Urobili: <2 mg/dL   Blood: x / Protein: 30 mg/dL / Nitrite: Negative   Leuk Esterase: Small / RBC: Many /HPF / WBC 9 /HPF   Sq Epi: x / Non Sq Epi: 1 /HPF / Bacteria: Negative        RADIOLOGY & ADDITIONAL TESTS:    Imaging Personally Reviewed:    Consultant(s) Notes Reviewed:      Care Discussed with Consultants/Other Providers:

## 2022-10-10 ENCOUNTER — TRANSCRIPTION ENCOUNTER (OUTPATIENT)
Age: 67
End: 2022-10-10

## 2022-10-10 VITALS
SYSTOLIC BLOOD PRESSURE: 128 MMHG | DIASTOLIC BLOOD PRESSURE: 56 MMHG | RESPIRATION RATE: 18 BRPM | TEMPERATURE: 98 F | OXYGEN SATURATION: 100 % | HEART RATE: 88 BPM

## 2022-10-10 PROBLEM — J47.1 BRONCHIECTASIS WITH (ACUTE) EXACERBATION: Chronic | Status: ACTIVE | Noted: 2022-09-30

## 2022-10-10 LAB
ALBUMIN SERPL ELPH-MCNC: 3.1 G/DL — LOW (ref 3.3–5)
ALP SERPL-CCNC: 244 U/L — HIGH (ref 40–120)
ALT FLD-CCNC: 132 U/L — HIGH (ref 4–33)
ANION GAP SERPL CALC-SCNC: 9 MMOL/L — SIGNIFICANT CHANGE UP (ref 7–14)
AST SERPL-CCNC: 109 U/L — HIGH (ref 4–32)
BILIRUB SERPL-MCNC: 0.3 MG/DL — SIGNIFICANT CHANGE UP (ref 0.2–1.2)
BUN SERPL-MCNC: 12 MG/DL — SIGNIFICANT CHANGE UP (ref 7–23)
CALCIUM SERPL-MCNC: 9.4 MG/DL — SIGNIFICANT CHANGE UP (ref 8.4–10.5)
CHLORIDE SERPL-SCNC: 98 MMOL/L — SIGNIFICANT CHANGE UP (ref 98–107)
CO2 SERPL-SCNC: 30 MMOL/L — SIGNIFICANT CHANGE UP (ref 22–31)
CREAT SERPL-MCNC: 0.31 MG/DL — LOW (ref 0.5–1.3)
CULTURE RESULTS: SIGNIFICANT CHANGE UP
EGFR: 117 ML/MIN/1.73M2 — SIGNIFICANT CHANGE UP
GLUCOSE BLDC GLUCOMTR-MCNC: 114 MG/DL — HIGH (ref 70–99)
GLUCOSE BLDC GLUCOMTR-MCNC: 133 MG/DL — HIGH (ref 70–99)
GLUCOSE BLDC GLUCOMTR-MCNC: 289 MG/DL — HIGH (ref 70–99)
GLUCOSE BLDC GLUCOMTR-MCNC: 83 MG/DL — SIGNIFICANT CHANGE UP (ref 70–99)
GLUCOSE SERPL-MCNC: 71 MG/DL — SIGNIFICANT CHANGE UP (ref 70–99)
HCT VFR BLD CALC: 41.6 % — SIGNIFICANT CHANGE UP (ref 34.5–45)
HGB BLD-MCNC: 12.4 G/DL — SIGNIFICANT CHANGE UP (ref 11.5–15.5)
MAGNESIUM SERPL-MCNC: 1.9 MG/DL — SIGNIFICANT CHANGE UP (ref 1.6–2.6)
MCHC RBC-ENTMCNC: 27.7 PG — SIGNIFICANT CHANGE UP (ref 27–34)
MCHC RBC-ENTMCNC: 29.8 GM/DL — LOW (ref 32–36)
MCV RBC AUTO: 92.9 FL — SIGNIFICANT CHANGE UP (ref 80–100)
NRBC # BLD: 0 /100 WBCS — SIGNIFICANT CHANGE UP (ref 0–0)
NRBC # FLD: 0 K/UL — SIGNIFICANT CHANGE UP (ref 0–0)
PHOSPHATE SERPL-MCNC: 2.9 MG/DL — SIGNIFICANT CHANGE UP (ref 2.5–4.5)
PLATELET # BLD AUTO: 289 K/UL — SIGNIFICANT CHANGE UP (ref 150–400)
POTASSIUM SERPL-MCNC: 4.4 MMOL/L — SIGNIFICANT CHANGE UP (ref 3.5–5.3)
POTASSIUM SERPL-SCNC: 4.4 MMOL/L — SIGNIFICANT CHANGE UP (ref 3.5–5.3)
PROT SERPL-MCNC: 6.3 G/DL — SIGNIFICANT CHANGE UP (ref 6–8.3)
RBC # BLD: 4.48 M/UL — SIGNIFICANT CHANGE UP (ref 3.8–5.2)
RBC # FLD: 14.2 % — SIGNIFICANT CHANGE UP (ref 10.3–14.5)
SODIUM SERPL-SCNC: 137 MMOL/L — SIGNIFICANT CHANGE UP (ref 135–145)
SPECIMEN SOURCE: SIGNIFICANT CHANGE UP
WBC # BLD: 10.09 K/UL — SIGNIFICANT CHANGE UP (ref 3.8–10.5)
WBC # FLD AUTO: 10.09 K/UL — SIGNIFICANT CHANGE UP (ref 3.8–10.5)

## 2022-10-10 PROCEDURE — 99239 HOSP IP/OBS DSCHRG MGMT >30: CPT

## 2022-10-10 RX ORDER — INSULIN GLARGINE 100 [IU]/ML
15 INJECTION, SOLUTION SUBCUTANEOUS
Qty: 0 | Refills: 0 | DISCHARGE

## 2022-10-10 RX ORDER — INSULIN LISPRO 100/ML
8 VIAL (ML) SUBCUTANEOUS
Refills: 0 | Status: DISCONTINUED | OUTPATIENT
Start: 2022-10-10 | End: 2022-10-10

## 2022-10-10 RX ORDER — INSULIN LISPRO 100/ML
VIAL (ML) SUBCUTANEOUS
Refills: 0 | Status: DISCONTINUED | OUTPATIENT
Start: 2022-10-10 | End: 2022-10-10

## 2022-10-10 RX ORDER — INSULIN GLARGINE 100 [IU]/ML
9 INJECTION, SOLUTION SUBCUTANEOUS AT BEDTIME
Refills: 0 | Status: DISCONTINUED | OUTPATIENT
Start: 2022-10-10 | End: 2022-10-10

## 2022-10-10 RX ORDER — MELOXICAM 15 MG/1
0 TABLET ORAL
Qty: 0 | Refills: 0 | DISCHARGE

## 2022-10-10 RX ORDER — HYDROCORTISONE 1 %
0 OINTMENT (GRAM) TOPICAL
Qty: 0 | Refills: 0 | DISCHARGE

## 2022-10-10 RX ORDER — LOSARTAN POTASSIUM 100 MG/1
1 TABLET, FILM COATED ORAL
Qty: 0 | Refills: 0 | DISCHARGE

## 2022-10-10 RX ORDER — INSULIN LISPRO 100/ML
VIAL (ML) SUBCUTANEOUS AT BEDTIME
Refills: 0 | Status: DISCONTINUED | OUTPATIENT
Start: 2022-10-10 | End: 2022-10-10

## 2022-10-10 RX ORDER — FAMOTIDINE 10 MG/ML
1 INJECTION INTRAVENOUS
Qty: 0 | Refills: 0 | DISCHARGE

## 2022-10-10 RX ORDER — INSULIN GLARGINE 100 [IU]/ML
6 INJECTION, SOLUTION SUBCUTANEOUS
Qty: 0 | Refills: 0 | DISCHARGE
Start: 2022-10-10

## 2022-10-10 RX ADMIN — BUDESONIDE AND FORMOTEROL FUMARATE DIHYDRATE 2 PUFF(S): 160; 4.5 AEROSOL RESPIRATORY (INHALATION) at 10:55

## 2022-10-10 RX ADMIN — TIOTROPIUM BROMIDE 1 CAPSULE(S): 18 CAPSULE ORAL; RESPIRATORY (INHALATION) at 10:56

## 2022-10-10 RX ADMIN — Medication 500 MILLIGRAM(S): at 12:27

## 2022-10-10 RX ADMIN — Medication 1 PACKET(S): at 12:28

## 2022-10-10 RX ADMIN — Medication 500 MILLIGRAM(S): at 07:33

## 2022-10-10 RX ADMIN — Medication 1 SPRAY(S): at 07:34

## 2022-10-10 RX ADMIN — AMLODIPINE BESYLATE 5 MILLIGRAM(S): 2.5 TABLET ORAL at 07:34

## 2022-10-10 RX ADMIN — Medication 1 TABLET(S): at 12:28

## 2022-10-10 RX ADMIN — MONTELUKAST 10 MILLIGRAM(S): 4 TABLET, CHEWABLE ORAL at 12:28

## 2022-10-10 RX ADMIN — PANTOPRAZOLE SODIUM 40 MILLIGRAM(S): 20 TABLET, DELAYED RELEASE ORAL at 07:34

## 2022-10-10 RX ADMIN — ENOXAPARIN SODIUM 30 MILLIGRAM(S): 100 INJECTION SUBCUTANEOUS at 18:04

## 2022-10-10 RX ADMIN — Medication 1 DROP(S): at 07:35

## 2022-10-10 RX ADMIN — Medication 8 UNIT(S): at 12:23

## 2022-10-10 RX ADMIN — POLYETHYLENE GLYCOL 3350 17 GRAM(S): 17 POWDER, FOR SOLUTION ORAL at 07:35

## 2022-10-10 RX ADMIN — Medication 325 MILLIGRAM(S): at 12:28

## 2022-10-10 RX ADMIN — Medication 10 MILLIGRAM(S): at 07:34

## 2022-10-10 RX ADMIN — LIDOCAINE AND PRILOCAINE CREAM 1 APPLICATION(S): 25; 25 CREAM TOPICAL at 12:30

## 2022-10-10 RX ADMIN — Medication 3: at 12:22

## 2022-10-10 RX ADMIN — Medication 3 MILLILITER(S): at 16:37

## 2022-10-10 RX ADMIN — Medication 50 MILLIGRAM(S): at 07:34

## 2022-10-10 RX ADMIN — Medication 37.5 MILLIGRAM(S): at 12:29

## 2022-10-10 NOTE — PROGRESS NOTE ADULT - PROBLEM SELECTOR PROBLEM 2
Abdominal pain
Abdominal pain
Steroid-induced hyperglycemia
Abdominal pain
Steroid-induced hyperglycemia
Abdominal pain
Abdominal pain
Hypertension
Abdominal pain
Abdominal pain
Steroid-induced hyperglycemia
Abdominal pain

## 2022-10-10 NOTE — PROGRESS NOTE ADULT - PROVIDER SPECIALTY LIST ADULT
Endocrinology
Infectious Disease
Endocrinology
Endocrinology
Hospitalist
Infectious Disease
Infectious Disease
Internal Medicine
Pulmonology
Surgery
Infectious Disease
Pulmonology
Pulmonology
Endocrinology
Endocrinology
Hospitalist
Hospitalist
Internal Medicine
Hospitalist
Internal Medicine
Hospitalist

## 2022-10-10 NOTE — PROGRESS NOTE ADULT - PROBLEM SELECTOR PLAN 7
# GERD  - pantoprazole, Maalox, home Pepcid    # HTN  - likely contribution of pain in addition to chronic HTN  - amlodipine, metoprolol succinate  - consider adding losartan after further med rec    # anxiety/depression  - c/w venlafaxine

## 2022-10-10 NOTE — DISCHARGE NOTE NURSING/CASE MANAGEMENT/SOCIAL WORK - PATIENT PORTAL LINK FT
You can access the FollowMyHealth Patient Portal offered by [facility] by registering at the following website: [email or web address]. By joining [facility]’s FollowMyHealth portal, you will also be able to view your health information using other applications (apps) compatible with our system.

## 2022-10-10 NOTE — PROGRESS NOTE ADULT - PROBLEM SELECTOR PLAN 9
(+) mild elevated AST/ALT, asymptomatic.   -Trend LFT's  -Check hepatitis serology, can follow up as outpatient
(+) mild elevated AST/ALT, asymptomatic.   -Trend LFT's  -Check hepatitis serology

## 2022-10-10 NOTE — PROGRESS NOTE ADULT - NUTRITIONAL ASSESSMENT
This patient has been assessed with a concern for Malnutrition and has been determined to have a diagnosis/diagnoses of Severe protein-calorie malnutrition and Underweight (BMI < 19).    This patient is being managed with:   Diet Consistent Carbohydrate/No Snacks-  Gastroesophageal Reflux Disease (GERD)  DASH/TLC {Sodium & Cholesterol Restricted} (DASH)  No Beef  No Pork  Supplement Feeding Modality:  Oral  Glucerna Shake Cans or Servings Per Day:  2       Frequency:  Daily  Entered: Sep 30 2022  8:26AM    
This patient has been assessed with a concern for Malnutrition and has been determined to have a diagnosis/diagnoses of Severe protein-calorie malnutrition and Underweight (BMI < 19).    This patient is being managed with:   Diet Consistent Carbohydrate/No Snacks-  Fiber/Residue Restricted (LOWFIBER)  DASH/TLC {Sodium & Cholesterol Restricted} (DASH)  No Beef  No Pork  Supplement Feeding Modality:  Oral  Glucerna Shake Cans or Servings Per Day:  2       Frequency:  Daily  Entered: Oct  4 2022  5:52PM    
This patient has been assessed with a concern for Malnutrition and has been determined to have a diagnosis/diagnoses of Severe protein-calorie malnutrition and Underweight (BMI < 19).    This patient is being managed with:   Diet Consistent Carbohydrate/No Snacks-  Gastroesophageal Reflux Disease (GERD)  DASH/TLC {Sodium & Cholesterol Restricted} (DASH)  No Beef  No Pork  Supplement Feeding Modality:  Oral  Glucerna Shake Cans or Servings Per Day:  2       Frequency:  Daily  Entered: Sep 30 2022  8:26AM    
This patient has been assessed with a concern for Malnutrition and has been determined to have a diagnosis/diagnoses of Severe protein-calorie malnutrition and Underweight (BMI < 19).    This patient is being managed with:   Diet DASH/TLC-  Sodium & Cholesterol Restricted  Consistent Carbohydrate {Evening Snack} (CSTCHOSN)  Lacto Veg (Accepts Milk & Milk Products)  Entered: Sep 28 2022  7:35PM    
This patient has been assessed with a concern for Malnutrition and has been determined to have a diagnosis/diagnoses of Severe protein-calorie malnutrition and Underweight (BMI < 19).    This patient is being managed with:   Diet DASH/TLC-  Sodium & Cholesterol Restricted  Consistent Carbohydrate {Evening Snack} (CSTCHOSN)  Lacto Veg (Accepts Milk & Milk Products)  Entered: Sep 28 2022  7:35PM    
This patient has been assessed with a concern for Malnutrition and has been determined to have a diagnosis/diagnoses of Severe protein-calorie malnutrition and Underweight (BMI < 19).    This patient is being managed with:   Diet Consistent Carbohydrate/No Snacks-  Fiber/Residue Restricted (LOWFIBER)  DASH/TLC {Sodium & Cholesterol Restricted} (DASH)  No Beef  No Pork  Supplement Feeding Modality:  Oral  Glucerna Shake Cans or Servings Per Day:  2       Frequency:  Daily  Entered: Oct  4 2022  5:52PM    
This patient has been assessed with a concern for Malnutrition and has been determined to have a diagnosis/diagnoses of Severe protein-calorie malnutrition and Underweight (BMI < 19).    This patient is being managed with:   Diet Consistent Carbohydrate/No Snacks-  Fiber/Residue Restricted (LOWFIBER)  DASH/TLC {Sodium & Cholesterol Restricted} (DASH)  No Beef  No Pork  Supplement Feeding Modality:  Oral  Glucerna Shake Cans or Servings Per Day:  2       Frequency:  Daily  Entered: Oct  4 2022  5:52PM    
This patient has been assessed with a concern for Malnutrition and has been determined to have a diagnosis/diagnoses of Severe protein-calorie malnutrition and Underweight (BMI < 19).    This patient is being managed with:   Diet Consistent Carbohydrate/No Snacks-  Gastroesophageal Reflux Disease (GERD)  DASH/TLC {Sodium & Cholesterol Restricted} (DASH)  No Beef  No Pork  Supplement Feeding Modality:  Oral  Glucerna Shake Cans or Servings Per Day:  2       Frequency:  Daily  Entered: Sep 30 2022  8:26AM    
This patient has been assessed with a concern for Malnutrition and has been determined to have a diagnosis/diagnoses of Severe protein-calorie malnutrition and Underweight (BMI < 19).    This patient is being managed with:   Diet Consistent Carbohydrate/No Snacks-  Fiber/Residue Restricted (LOWFIBER)  DASH/TLC {Sodium & Cholesterol Restricted} (DASH)  No Beef  No Pork  Supplement Feeding Modality:  Oral  Glucerna Shake Cans or Servings Per Day:  2       Frequency:  Daily  Entered: Oct  4 2022  5:52PM

## 2022-10-10 NOTE — PROGRESS NOTE ADULT - PROBLEM SELECTOR PROBLEM 7
Taking medication for chronic disease

## 2022-10-10 NOTE — PROGRESS NOTE ADULT - SUBJECTIVE AND OBJECTIVE BOX
Rad Lomeli MD  Academic Hospitalist  Pager [phone]  Email: [email or web address]  Available on [employer] Teams        PROGRESS NOTE:     Patient is a 67y old  Female who presents with a chief complaint of abdominal pain (09 Oct 2022 14:02)      SUBJECTIVE / OVERNIGHT EVENTS:  Patient seen and examined this morning. She complains of abdominal pain and "gas". She is still concerned because she notices moments of desaturation and gets short of breath easily. I discussed extensively with the patient, the  and her son via 's cell phone. I explained that her medical care is currently optimized and we may not be able to improve her breathing from her current baseline.   ADDITIONAL REVIEW OF SYSTEMS:  No f/c/n/v    MEDICATIONS  (STANDING):  acetaminophen     Tablet .. 500 milliGRAM(s) Oral every 6 hours  albuterol/ipratropium for Nebulization 3 milliLiter(s) Nebulizer every 6 hours  amLODIPine   Tablet 5 milliGRAM(s) Oral daily  artificial  tears Solution 1 Drop(s) Both EYES two times a day  bisacodyl 5 milliGRAM(s) Oral at bedtime  budesonide 160 MICROgram(s)/formoterol 4.5 MICROgram(s) Inhaler 2 Puff(s) Inhalation two times a day  colistimethate for Nebulization 150 milliGRAM(s) CBA Nebulizer every 12 hours  dextrose 5%. 1000 milliLiter(s) (100 mL/Hr) IV Continuous <Continuous>  dextrose 5%. 1000 milliLiter(s) (50 mL/Hr) IV Continuous <Continuous>  dextrose 50% Injectable 25 Gram(s) IV Push once  dextrose 50% Injectable 12.5 Gram(s) IV Push once  dextrose 50% Injectable 25 Gram(s) IV Push once  enoxaparin Injectable 30 milliGRAM(s) SubCutaneous every 24 hours  ferrous    sulfate 325 milliGRAM(s) Oral daily  fluticasone propionate 50 MICROgram(s)/spray Nasal Spray 1 Spray(s) Both Nostrils two times a day  glucagon  Injectable 1 milliGRAM(s) IntraMuscular once  influenza  Vaccine (HIGH DOSE) 0.7 milliLiter(s) IntraMuscular once  insulin glargine Injectable (LANTUS) 9 Unit(s) SubCutaneous at bedtime  insulin lispro (ADMELOG) corrective regimen sliding scale   SubCutaneous three times a day before meals  insulin lispro (ADMELOG) corrective regimen sliding scale   SubCutaneous at bedtime  insulin lispro Injectable (ADMELOG) 8 Unit(s) SubCutaneous three times a day before meals  lidocaine/prilocaine Cream 1 Application(s) Topical daily  metoprolol succinate ER 50 milliGRAM(s) Oral daily  montelukast 10 milliGRAM(s) Oral daily  multivitamin 1 Tablet(s) Oral daily  nystatin    Suspension 705201 Unit(s) Oral daily  pantoprazole    Tablet 40 milliGRAM(s) Oral before breakfast  polyethylene glycol 3350 17 Gram(s) Oral two times a day  predniSONE   Tablet 10 milliGRAM(s) Oral daily  psyllium Powder 1 Packet(s) Oral daily  tiotropium 18 MICROgram(s) Capsule 1 Capsule(s) Inhalation daily  venlafaxine XR. 37.5 milliGRAM(s) Oral daily    MEDICATIONS  (PRN):  ALBUTerol    90 MICROgram(s) HFA Inhaler 2 Puff(s) Inhalation every 6 hours PRN Shortness of Breath and/or Wheezing  dextrose Oral Gel 15 Gram(s) Oral once PRN Blood Glucose LESS THAN 70 milliGRAM(s)/deciliter  guaiFENesin Oral Liquid (Sugar-Free) 200 milliGRAM(s) Oral every 6 hours PRN Cough  simethicone 80 milliGRAM(s) Chew four times a day PRN Gas Pain  sodium chloride 3%  Inhalation 4 milliLiter(s) Inhalation every 8 hours PRN with aerobica/airway clearance      CAPILLARY BLOOD GLUCOSE      POCT Blood Glucose.: 289 mg/dL (10 Oct 2022 11:54)  POCT Blood Glucose.: 114 mg/dL (10 Oct 2022 08:02)  POCT Blood Glucose.: 83 mg/dL (10 Oct 2022 07:33)  POCT Blood Glucose.: 188 mg/dL (09 Oct 2022 22:36)  POCT Blood Glucose.: 120 mg/dL (09 Oct 2022 21:21)  POCT Blood Glucose.: 88 mg/dL (09 Oct 2022 21:07)  POCT Blood Glucose.: 99 mg/dL (09 Oct 2022 21:01)  POCT Blood Glucose.: 68 mg/dL (09 Oct 2022 20:52)  POCT Blood Glucose.: 65 mg/dL (09 Oct 2022 20:51)  POCT Blood Glucose.: 81 mg/dL (09 Oct 2022 19:03)  POCT Blood Glucose.: 245 mg/dL (09 Oct 2022 17:16)    I&O's Summary    09 Oct 2022 07:01  -  10 Oct 2022 07:00  --------------------------------------------------------  IN: 240 mL / OUT: 0 mL / NET: 240 mL        PHYSICAL EXAM:  Vital Signs Last 24 Hrs  T(C): 36.7 (10 Oct 2022 10:32), Max: 36.8 (09 Oct 2022 21:11)  T(F): 98 (10 Oct 2022 10:32), Max: 98.2 (09 Oct 2022 21:11)  HR: 85 (10 Oct 2022 10:32) (79 - 95)  BP: 144/66 (10 Oct 2022 10:32) (125/68 - 166/77)  BP(mean): --  RR: 18 (10 Oct 2022 10:32) (18 - 19)  SpO2: 98% (10 Oct 2022 10:32) (96% - 100%)    Parameters below as of 10 Oct 2022 10:32  Patient On (Oxygen Delivery Method): nasal cannula        PHYSICAL EXAM:  GENERAL: sickly appearing, NC noted  EYES: conjunctiva and sclera clear  NECK: Supple, No JVD  CHEST/LUNG: (+) scattered rhonchi at bases bilaterally;   HEART: Regular rate and rhythm; No murmurs, rubs, or gallops  ABDOMEN: Soft, minimally tender, Nondistended; Bowel sounds present  EXTREMITIES:  2+ Peripheral Pulses, No clubbing, cyanosis, or edema  PSYCH: AAOx3        LABS:                        12.4   10.09 )-----------( 289      ( 10 Oct 2022 07:15 )             41.6     10-10    137  |  98  |  12  ----------------------------<  71  4.4   |  30  |  0.31<L>    Ca    9.4      10 Oct 2022 07:15  Phos  2.9     10-10  Mg     1.90     10-10    TPro  6.3  /  Alb  3.1<L>  /  TBili  0.3  /  DBili  x   /  AST  109<H>  /  ALT  132<H>  /  AlkPhos  244<H>  10-10          Urinalysis Basic - ( 08 Oct 2022 17:11 )    Color: Yellow / Appearance: Clear / S.016 / pH: x  Gluc: x / Ketone: Negative  / Bili: Negative / Urobili: <2 mg/dL   Blood: x / Protein: 30 mg/dL / Nitrite: Negative   Leuk Esterase: Small / RBC: Many /HPF / WBC 9 /HPF   Sq Epi: x / Non Sq Epi: 1 /HPF / Bacteria: Negative        Culture - Urine (collected 08 Oct 2022 19:16)  Source: Clean Catch Clean Catch (Midstream)  Final Report (10 Oct 2022 00:02):    <10,000 CFU/mL Normal Urogenital Denise        RADIOLOGY & ADDITIONAL TESTS:  Results Reviewed:   Imaging Personally Reviewed:  Electrocardiogram Personally Reviewed:    COORDINATION OF CARE:  Care Discussed with Consultants/Other Providers [Y/N]:   Prior or Outpatient Records Reviewed [Y/N]:

## 2022-10-10 NOTE — PROGRESS NOTE ADULT - REASON FOR ADMISSION
abdominal pain

## 2022-10-10 NOTE — PROGRESS NOTE ADULT - PROBLEM SELECTOR PROBLEM 1
Type 2 diabetes mellitus, with long-term current use of insulin
Type 2 diabetes mellitus, with long-term current use of insulin
Bronchiectasis with acute exacerbation
Type 2 diabetes mellitus, with long-term current use of insulin
Type 2 diabetes mellitus, with long-term current use of insulin
Bronchiectasis with acute exacerbation

## 2022-10-10 NOTE — PROGRESS NOTE ADULT - PROBLEM SELECTOR PLAN 1
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 Last settings documented at [facility]:  AVAPS RR 18, EPAP 5, Min IP 15, Max IP 25,   - duonebs q6h  -Started Prednisone as per Pulm   - Zosyn+ inhaled Colistin per ID recs, completed Zosyn as per ID

## 2022-10-10 NOTE — CHART NOTE - NSCHARTNOTEFT_GEN_A_CORE
Chart reviewed - glucoses noted below    CAPILLARY BLOOD GLUCOSE    POCT Blood Glucose.: 114 mg/dL (10 Oct 2022 08:02)  POCT Blood Glucose.: 83 mg/dL (10 Oct 2022 07:33)  POCT Blood Glucose.: 188 mg/dL (09 Oct 2022 22:36)  POCT Blood Glucose.: 120 mg/dL (09 Oct 2022 21:21)  POCT Blood Glucose.: 88 mg/dL (09 Oct 2022 21:07)  POCT Blood Glucose.: 99 mg/dL (09 Oct 2022 21:01)  POCT Blood Glucose.: 68 mg/dL (09 Oct 2022 20:52)  POCT Blood Glucose.: 65 mg/dL (09 Oct 2022 20:51)  POCT Blood Glucose.: 81 mg/dL (09 Oct 2022 19:03)  POCT Blood Glucose.: 245 mg/dL (09 Oct 2022 17:16)  POCT Blood Glucose.: 128 mg/dL (09 Oct 2022 12:14)    RECS:   -Decrease Lantus to 9 units qhS given large delta  -Hypoglycemia noted yesterday due to higher doses of Admelog after dinner  -Can continue Admelog 10 units qAC but change to low dose correctional scale before each meal and low dose correctional scale at bedtime      Manuela Hernandez MD  Endocrine Fellow  Can be reached via teams.     For all urgent matters, can call answering service at [phone] (weekdays); [phone] (nights/weekends)  Any non-urgent consults or questions can be emailed to [email or web address] or [email or web address] Chart reviewed - glucoses noted below    CAPILLARY BLOOD GLUCOSE    POCT Blood Glucose.: 114 mg/dL (10 Oct 2022 08:02)  POCT Blood Glucose.: 83 mg/dL (10 Oct 2022 07:33)  POCT Blood Glucose.: 188 mg/dL (09 Oct 2022 22:36)  POCT Blood Glucose.: 120 mg/dL (09 Oct 2022 21:21)  POCT Blood Glucose.: 88 mg/dL (09 Oct 2022 21:07)  POCT Blood Glucose.: 99 mg/dL (09 Oct 2022 21:01)  POCT Blood Glucose.: 68 mg/dL (09 Oct 2022 20:52)  POCT Blood Glucose.: 65 mg/dL (09 Oct 2022 20:51)  POCT Blood Glucose.: 81 mg/dL (09 Oct 2022 19:03)  POCT Blood Glucose.: 245 mg/dL (09 Oct 2022 17:16)  POCT Blood Glucose.: 128 mg/dL (09 Oct 2022 12:14)    RECS:   -Decrease Lantus to 9 units qhS given large delta  -Hypoglycemia noted yesterday due to higher doses of Admelog after dinner  -Can decrease Admelog to 8 units qAC (from 10 units) and change to low dose correctional scale before each meal and low dose correctional scale at bedtime      Manuela Hernandez MD  Endocrine Fellow  Can be reached via teams.     For all urgent matters, can call answering service at [phone] (weekdays); [phone] (nights/weekends)  Any non-urgent consults or questions can be emailed to [email or web address] or [email or web address]

## 2022-10-10 NOTE — PROGRESS NOTE ADULT - PROBLEM SELECTOR PLAN 2
on and off. Abd exam is benign. ? constipation  -Cont to monitor   -Cont Bowel regimen
on and off. Abd exam is benign. ? constipation  -Cont to monitor   -Cont Bowel regimen  -(+) itching, will check LFT's
on and off. Abd exam is benign. ? constipation, much improved   -Cont to monitor   -Cont Bowel regimen  -(+) itching, will check LFT's---> no signs of cholestasis. No itching today
# possibly bilary colic 2/2 choledocholithiasis  - f/u MRCP; GI recommending outpatient ERCP    # constipation possibly 2/2 PCD  - water enema  - Miralax, senna, bisacodyl po; stool count    # analgesia  - Patient seemed to be taking omeprazole for minimal abdominal pain relief but might benefit from acetaminophen, possibly avoid opiates to avoid biliary colic, possibly avoid NSAID to avoid mucosal irritation/bleed (hx GERD)  - acetaminophen 500 po q6h standing, Toradol 15 q8 PRN for pain    # GERD  - pantoprazole, Maalox, home Pepcid    # early satiety - possibly 2/2 bloating/constipation, though malignancy possible  # weight loss - likely due to poor intake for pain avoidance, though malignancy possible  - diet as per nutrition, multivitamin (has home iron), weekly weights  - consider mirtazapine after further med rec  - ask about cancer screening history
Resolved 10/1
# possibly bilary colic 2/2 choledocholithiasis  - f/u MRCP; GI recommending outpatient ERCP    # constipation possibly 2/2 PCD  - water enema  - Miralax, senna, bisacodyl po; stool count    # analgesia  - Patient seemed to be taking omeprazole for minimal abdominal pain relief but might benefit from acetaminophen, possibly avoid opiates to avoid biliary colic, possibly avoid NSAID to avoid mucosal irritation/bleed (hx GERD)  - acetaminophen 500 po q6h standing, Toradol 15 q8 PRN for pain    # GERD  - pantoprazole, Maalox, home Pepcid     # early satiety - possibly 2/2 bloating/constipation, though malignancy possible     # weight loss - likely due to poor intake for pain avoidance, though malignancy possible  - consider mirtazapine after further med rec  - ask about cancer screening history
Resolved 10/1
on and off. Abd exam is benign. ? constipation, much improved   -Cont to monitor   -Cont Bowel regimen  -(+) itching, will check LFT's---> no signs of cholestasis. No itching today

## 2022-10-10 NOTE — PROGRESS NOTE ADULT - PROBLEM SELECTOR PLAN 3
# constipation possibly 2/2 PCD, improved with Laxatives   - Miralax, senna, bisacodyl, simethicone po; stool count    # early satiety - possibly 2/2 bloating/constipation    # Bloating, add fiber supplement     Monitor BM

## 2022-10-10 NOTE — PROGRESS NOTE ADULT - PROBLEM SELECTOR PLAN 8
- VTE ppx: enoxaparin  - GI ppx: pantoprazole as above  - Diet: DASH/carb controlled, GERD, no beef/pork, Glucerna *2 daily  - dispo: pending clinical course
- VTE ppx: enoxaparin  - GI ppx: pantoprazole as above  - Diet: DASH/carb controlled, GERD, no beef/pork, Glucerna *2 daily  - dispo: pending clinical course  -PT eval today
- VTE ppx: enoxaparin  - GI ppx: pantoprazole as above  - Diet: DASH/carb controlled, GERD, no beef/pork, Glucerna *2 daily  - dispo: Family felt pt not ready for home yet citing not able to walk. Will f/u PT eval.  -PT eval today
- VTE ppx: enoxaparin  - GI ppx: pantoprazole as above  - Diet: DASH/carb controlled, GERD, no beef/pork, Glucerna *2 daily  - dispo: Family felt pt not ready for home yet citing not able to walk. Will f/u PT eval.  -PT eval, home PT
- VTE ppx: enoxaparin  - GI ppx: pantoprazole as above  - Diet: DASH/carb controlled, GERD, no beef/pork, Glucerna *2 daily  - dispo: Family felt pt not ready for home yet citing not able to walk. Will f/u PT eval.  -PT eval today
- VTE ppx: enoxaparin  - GI ppx: pantoprazole as above  - Diet: DASH/carb controlled, GERD, no beef/pork, Glucerna *2 daily  - dispo: pending clinical course

## 2022-10-10 NOTE — CHART NOTE - NSCHARTNOTESELECT_GEN_ALL_CORE
Event Note
Event Note
Endocrine Fellow/Event Note
Endocrinology/Event Note
Nutrition Services
endocrinology/Event Note
endocrinology/Event Note

## 2022-10-17 LAB — ACID FAST STN SPT: NORMAL

## 2022-10-20 ENCOUNTER — APPOINTMENT (OUTPATIENT)
Dept: PULMONOLOGY | Facility: CLINIC | Age: 67
End: 2022-10-20

## 2022-10-20 VITALS
TEMPERATURE: 98 F | HEIGHT: 63 IN | HEART RATE: 83 BPM | SYSTOLIC BLOOD PRESSURE: 142 MMHG | DIASTOLIC BLOOD PRESSURE: 72 MMHG | RESPIRATION RATE: 16 BRPM

## 2022-10-20 VITALS — BODY MASS INDEX: 16.3 KG/M2 | WEIGHT: 92 LBS

## 2022-10-20 PROCEDURE — 99215 OFFICE O/P EST HI 40 MIN: CPT

## 2022-10-20 NOTE — PHYSICAL EXAM
[No Acute Distress] : no acute distress [Normal Appearance] : normal appearance [No Neck Mass] : no neck mass [Normal Rate/Rhythm] : normal rate/rhythm [Normal S1, S2] : normal s1, s2 [No Murmurs] : no murmurs [No Resp Distress] : no resp distress [No Clubbing] : no clubbing [No Cyanosis] : no cyanosis [No Edema] : no edema [Oriented x3] : oriented x3 [Normal Affect] : normal affect [TextBox_68] : scattered rales, otherwise clear [TextBox_99] : wheelchair

## 2022-10-20 NOTE — HISTORY OF PRESENT ILLNESS
[TextBox_4] : Leandro speaking, accompained by son who is translating. \par \par Patient is a 68 yo F w/ probable PCD (characterized by sinusitis, bronchiectasis and recurrent infections), recent COVID (1/2022, hospitalized x 2 weeks and also treated with Meropenem for Pseudomonas and ESBL Klebsiella in sputum), chronic hypoxemic (O2 2 to 4L nc) and hypercapnic respiratory failure (On AVAPS) who presents  for follow up. She has had recurrent hospitalizations for exacerbations of bronchiectasis. \par Regimen includes  Albuterol, Hypersal, Aerobika TID, and Spiriva, Symbicort 160 BID, Singulair. She has not been able to tolerate inhaled tobramycin or amikacin. \par \par Previous cultures have been negative for mycobacteria but have  grown Pseudomonas  resistnat to fluoro quinolones and gentamycin, ciptobramycin.\par \par Last TTE 6/2021 overall normal but unable to estimate PASP\par \par  PFTs  show severe restriction and obstruction and DLCO is reduced at well.  She had difficulty performing the maneuvers. On exercise oximetry she desaturated to 88% when walking on RA.  ON 2 L NC oxygen saturation remained in the mid 90s.  \par \par Patient using her NIV at night and is receiving supplies.  Not taking mirtazapine.  However weight is stable over the past year.  \par \par Hospitalized 9/28-10/8/22 at [facility] with abdominal pain.  had exacerbation of bronchiectasis and evidence of nonobstructing choledocholithiasis and ERCP was recommended as outpatient.  \par \par chest/abd CT 9/28 : chronic infectious or inflammatory bronchitis and\par bronchiolitis. Punctate nonobstructing choledocholithiasis in the distal\par common duct, new from the prior study.\par GI evaluated the pt and recommended outpatient ERCP and MRCP was done 10/2\par which showed Hepaticojejunostomy, Pneumobilia but no choledocholithiasis in CBD\par leading to the hepaticojejunostomy. Previously seen calculi in CT are located\par in the distal preampullary precluded CBD.\par sputum cx with pseudomonas (R to genta, tobra, levo)\par \par She was treated with Zosyn IV, prednisone, inhaled colistin, which she tolerated and discharged in  early october.  \par Labs notable for negative RVP. alk phos was 244, SGOT 109, SGPT 132. \par VBG 7.35/66/58 \par \par She is coughing and bringing up mucous which is clear.  Reports fatigue.  WEight today is 92lbs.

## 2022-10-20 NOTE — ASSESSMENT
[FreeTextEntry1] : Patient is a 66 yo F w/ probable PCD (characterized by sinusitis, bronchiectasis and recurrent infections), recent COVID (1/2022, hospitalized x 2 weeks and also treated with Meropenem for Pseudomonas and ESBL Klebsiella in sputum), chronic hypoxemic (O2 2 to 4L nc) and hypercapnic respiratory failure (On AVAPS) who presentsfor followup. She was recently hospitalized with abdominal pain, and treated with Zosyn and inhaled colistin for pseudomonal infection/exacerbation of bronchiectasis.  Also received a course of steroids.  pCO2 was stable for her in the 60s.  Also with nonobstructing choledocholithiasis, and followup recommended with GI for ERCP when stable.  \par On exam today.  she has scattered rales and appears to be at baseline.  \par \par #Chronic Respiratory failure 2/2 bronchiectasis. Chronically infected with pseudomonas. She was referred for transplant evaluation but refused it.  \par -  c/w Albuterol, Hypersal, Aerobika TID\par - c/w Spiriva, Symbicort 160 BID, Singulair\par -- c/w supplemental O2 and AVAPS QHS\par - Will add inhaled colistin to her regimen\par - \par -#Dysphagia - Cinesphogram 2019 with evidence of laryngeal penetration but no aspiration. \par - Continue AVAPS nocturnally- has chronic hypercapnea and has benefitted from NIV nocturnally.  Elevation of CO2 can lead to recurrent hospitalizations, respiratory failure and death. \par We discussed techniques for minimizing aerophagia. \par \par RTC 3 months\par \par \par

## 2022-11-15 NOTE — PROGRESS NOTE ADULT - SUBJECTIVE AND OBJECTIVE BOX
CHIEF COMPLAINT: Patient is a 66y old  Female who presents with a chief complaint of SOB (11 Jul 2021 06:40)      Interval Events: Pt seen at bedside Vanco level subtherapeutic and dose increased     REVIEW OF SYSTEMS:  Constitutional:   Eyes:  ENT:  CV:  Resp:  GI:  :  MSK:  Integumentary:  Neurological:  Psychiatric:  Endocrine:  Hematologic/Lymphatic:  Allergic/Immunologic:  [x ] All other systems negative      OBJECTIVE:  ICU Vital Signs Last 24 Hrs  T(C): 36.7 (12 Jul 2021 05:50), Max: 36.7 (11 Jul 2021 21:00)  T(F): 98 (12 Jul 2021 05:50), Max: 98 (11 Jul 2021 21:00)  HR: 86 (12 Jul 2021 05:50) (84 - 98)  BP: 121/74 (12 Jul 2021 05:50) (121/74 - 137/75)  BP(mean): --  ABP: --  ABP(mean): --  RR: 20 (12 Jul 2021 05:50) (18 - 20)  SpO2: 93% (12 Jul 2021 05:50) (93% - 98%)    Mode: AVAPS, RR (machine): 18, TV (machine): 380, FiO2: 28, PEEP: 5, PIP: 16    CAPILLARY BLOOD GLUCOSE      POCT Blood Glucose.: 131 mg/dL (11 Jul 2021 21:25)          HOSPITAL MEDICATIONS:  MEDICATIONS  (STANDING):  albuterol/ipratropium for Nebulization 3 milliLiter(s) Nebulizer every 6 hours  amLODIPine   Tablet 5 milliGRAM(s) Oral daily  ascorbic acid 500 milliGRAM(s) Oral daily  budesonide 160 MICROgram(s)/formoterol 4.5 MICROgram(s) Inhaler 2 Puff(s) Inhalation two times a day  cefepime   IVPB 2000 milliGRAM(s) IV Intermittent every 12 hours  chlorhexidine 4% Liquid 1 Application(s) Topical daily  dextrose 40% Gel 15 Gram(s) Oral once  dextrose 5%. 1000 milliLiter(s) (50 mL/Hr) IV Continuous <Continuous>  dextrose 5%. 1000 milliLiter(s) (100 mL/Hr) IV Continuous <Continuous>  dextrose 50% Injectable 25 Gram(s) IV Push once  dextrose 50% Injectable 12.5 Gram(s) IV Push once  dextrose 50% Injectable 25 Gram(s) IV Push once  enoxaparin Injectable 40 milliGRAM(s) SubCutaneous daily  famotidine    Tablet 40 milliGRAM(s) Oral at bedtime  ferrous    sulfate 325 milliGRAM(s) Oral daily  fluticasone propionate 50 MICROgram(s)/spray Nasal Spray 2 Spray(s) Both Nostrils two times a day  glucagon  Injectable 1 milliGRAM(s) IntraMuscular once  insulin glargine Injectable (LANTUS) 15 Unit(s) SubCutaneous at bedtime  insulin lispro (ADMELOG) corrective regimen sliding scale   SubCutaneous three times a day before meals  insulin lispro (ADMELOG) corrective regimen sliding scale   SubCutaneous at bedtime  insulin lispro Injectable (ADMELOG) 8 Unit(s) SubCutaneous three times a day before meals  mirtazapine 7.5 milliGRAM(s) Oral at bedtime  montelukast 10 milliGRAM(s) Oral daily  pantoprazole    Tablet 40 milliGRAM(s) Oral before breakfast  senna 2 Tablet(s) Oral at bedtime  sodium chloride 0.65% Nasal 2 Spray(s) Both Nostrils every 12 hours  sodium chloride 3%  Inhalation 3 milliLiter(s) Inhalation every 6 hours  vancomycin  IVPB      vancomycin  IVPB 1000 milliGRAM(s) IV Intermittent every 12 hours    MEDICATIONS  (PRN):  acetaminophen   Tablet .. 650 milliGRAM(s) Oral every 4 hours PRN Mild Pain (1 - 3)  acetaminophen   Tablet .. 650 milliGRAM(s) Oral every 6 hours PRN Temp greater or equal to 38C (100.4F)  guaiFENesin Oral Liquid (Sugar-Free) 100 milliGRAM(s) Oral every 6 hours PRN Cough  simethicone 80 milliGRAM(s) Chew three times a day PRN Gas      LABS:                        10.3   7.48  )-----------( 148      ( 12 Jul 2021 04:12 )             34.6     07-12    143  |  101  |  12  ----------------------------<  104<H>  3.9   |  34<H>  |  0.35<L>    Ca    9.4      12 Jul 2021 04:12  Phos  3.2     07-12  Mg     1.90     07-12                MICROBIOLOGY:     RADIOLOGY:  [ ] Reviewed and interpreted by me    PULMONARY FUNCTION TESTS:    EKG: CHIEF COMPLAINT: Patient is a 66y old  Female who presents with a chief complaint of SOB (11 Jul 2021 06:40)      Interval Events: Pt seen at bedside Vanco level subtherapeutic and dose increased     REVIEW OF SYSTEMS:  Constitutional: Denies pain   CV: Denies   Resp: + [place]  GI: Denies   [x ] All other systems negative      OBJECTIVE:  ICU Vital Signs Last 24 Hrs  T(C): 36.7 (12 Jul 2021 05:50), Max: 36.7 (11 Jul 2021 21:00)  T(F): 98 (12 Jul 2021 05:50), Max: 98 (11 Jul 2021 21:00)  HR: 86 (12 Jul 2021 05:50) (84 - 98)  BP: 121/74 (12 Jul 2021 05:50) (121/74 - 137/75)  BP(mean): --  ABP: --  ABP(mean): --  RR: 20 (12 Jul 2021 05:50) (18 - 20)  SpO2: 93% (12 Jul 2021 05:50) (93% - 98%)    Mode: AVAPS, RR (machine): 18, TV (machine): 380, FiO2: 28, PEEP: 5, PIP: 16    CAPILLARY BLOOD GLUCOSE      POCT Blood Glucose.: 131 mg/dL (11 Jul 2021 21:25)          HOSPITAL MEDICATIONS:  MEDICATIONS  (STANDING):  albuterol/ipratropium for Nebulization 3 milliLiter(s) Nebulizer every 6 hours  amLODIPine   Tablet 5 milliGRAM(s) Oral daily  ascorbic acid 500 milliGRAM(s) Oral daily  budesonide 160 MICROgram(s)/formoterol 4.5 MICROgram(s) Inhaler 2 Puff(s) Inhalation two times a day  cefepime   IVPB 2000 milliGRAM(s) IV Intermittent every 12 hours  chlorhexidine 4% Liquid 1 Application(s) Topical daily  dextrose 40% Gel 15 Gram(s) Oral once  dextrose 5%. 1000 milliLiter(s) (50 mL/Hr) IV Continuous <Continuous>  dextrose 5%. 1000 milliLiter(s) (100 mL/Hr) IV Continuous <Continuous>  dextrose 50% Injectable 25 Gram(s) IV Push once  dextrose 50% Injectable 12.5 Gram(s) IV Push once  dextrose 50% Injectable 25 Gram(s) IV Push once  enoxaparin Injectable 40 milliGRAM(s) SubCutaneous daily  famotidine    Tablet 40 milliGRAM(s) Oral at bedtime  ferrous    sulfate 325 milliGRAM(s) Oral daily  fluticasone propionate 50 MICROgram(s)/spray Nasal Spray 2 Spray(s) Both Nostrils two times a day  glucagon  Injectable 1 milliGRAM(s) IntraMuscular once  insulin glargine Injectable (LANTUS) 15 Unit(s) SubCutaneous at bedtime  insulin lispro (ADMELOG) corrective regimen sliding scale   SubCutaneous three times a day before meals  insulin lispro (ADMELOG) corrective regimen sliding scale   SubCutaneous at bedtime  insulin lispro Injectable (ADMELOG) 8 Unit(s) SubCutaneous three times a day before meals  mirtazapine 7.5 milliGRAM(s) Oral at bedtime  montelukast 10 milliGRAM(s) Oral daily  pantoprazole    Tablet 40 milliGRAM(s) Oral before breakfast  senna 2 Tablet(s) Oral at bedtime  sodium chloride 0.65% Nasal 2 Spray(s) Both Nostrils every 12 hours  sodium chloride 3%  Inhalation 3 milliLiter(s) Inhalation every 6 hours  vancomycin  IVPB      vancomycin  IVPB 1000 milliGRAM(s) IV Intermittent every 12 hours    MEDICATIONS  (PRN):  acetaminophen   Tablet .. 650 milliGRAM(s) Oral every 4 hours PRN Mild Pain (1 - 3)  acetaminophen   Tablet .. 650 milliGRAM(s) Oral every 6 hours PRN Temp greater or equal to 38C (100.4F)  guaiFENesin Oral Liquid (Sugar-Free) 100 milliGRAM(s) Oral every 6 hours PRN Cough  simethicone 80 milliGRAM(s) Chew three times a day PRN Gas      LABS:                        10.3   7.48  )-----------( 148      ( 12 Jul 2021 04:12 )             34.6     07-12    143  |  101  |  12  ----------------------------<  104<H>  3.9   |  34<H>  |  0.35<L>    Ca    9.4      12 Jul 2021 04:12  Phos  3.2     07-12  Mg     1.90     07-12                MICROBIOLOGY:     RADIOLOGY:  [ ] Reviewed and interpreted by me    PULMONARY FUNCTION TESTS:    EKG: no

## 2022-12-20 ENCOUNTER — APPOINTMENT (OUTPATIENT)
Dept: PULMONOLOGY | Facility: CLINIC | Age: 67
End: 2022-12-20

## 2022-12-20 PROCEDURE — 99214 OFFICE O/P EST MOD 30 MIN: CPT | Mod: 95

## 2022-12-20 NOTE — ASSESSMENT
[FreeTextEntry1] : Patient is a 68 yo F w/ probable PCD (characterized by sinusitis, bronchiectasis and recurrent infections), recent COVID (1/2022, hospitalized x 2 weeks and also treated with Meropenem for Pseudomonas and ESBL Klebsiella in sputum), chronic hypoxemic (O2 2 to 4L nc) and hypercapnic respiratory failure (On AVAPS) who presentsfor followup. She was most recently hospitalized with abdominal pain, and treated with Zosyn and inhaled colistin for pseudomonal infection/exacerbation of bronchiectasis.  Also received a course of steroids.  pCO2 was stable for her in the 60s.  \par She is experiencing increased thickenss of her secretions.  Denies fever.  Does not appear SOB on televisit.  \par She is tolerating the colistin, and is using Hypersal twice daily.\par \par #Chronic Respiratory failure 2/2 bronchiectasis. Chronically infected with pseudomonas. She was referred for transplant evaluation but refused it.  \par - azithromycin x 5 days and prednisone 30QD for 4 days.  Azithromycin to help with airway inflammation.  I do not think she is infected at this time.  She has chronic colonization with pseudomonas and klebsiella.  \par -  c/w Albuterol, Hypersal, Aerobika TID\par - c/w Spiriva, Symbicort 160 BID, Singulair\par -- c/w supplemental O2 and AVAPS QHS\par -continue Colistin every other month.  \par - \par \par \par

## 2022-12-20 NOTE — HISTORY OF PRESENT ILLNESS
[Home] : at home, [unfilled] , at the time of the visit. 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  . She has had recurrent hospitalizations for exacerbations of bronchiectasis. \par Regimen includes  Albuterol, Hypersal, Aerobika TID, and Spiriva, Symbicort 160 BID, Singulair. She has not been able to tolerate inhaled tobramycin or amikacin. She was recently started on inhaled colistin which she tolerated during her last admission.  \par \par Previous cultures have been negative for mycobacteria but have  grown Pseudomonas  resistnat to fluoro quinolones and gentamycin, ciptobramycin.\par \par Last TTE 6/2021 overall normal but unable to estimate PASP\par \par  PFTs  show severe restriction and obstruction and DLCO is reduced at well.  She had difficulty performing the maneuvers. On exercise oximetry she desaturated to 88% when walking on RA.  ON 2 L NC oxygen saturation remained in the mid 90s.  \par \par Patient using her NIV at night and is receiving supplies.  Not taking mirtazapine.  However weight is stable over the past year.  \par \par Hospitalized 9/28-10/8/22 at [facility] with abdominal pain.  had exacerbation of bronchiectasis and evidence of nonobstructing choledocholithiasis and ERCP was recommended as outpatient.  GI evaluated the pt and recommended outpatient ERCP and MRCP was done 10/2\par which showed Hepaticojejunostomy, Pneumobilia but no choledocholithiasis in CBD leading to the hepaticojejunostomy. Previously seen calculi in CT are located in the distal preampullary precluded CBD.\par \par sputum cx with pseudomonas (R to genta, tobra, levo)\par \par She was treated with Zosyn IV, prednisone, inhaled colistin, which she tolerated and discharged in  early october.  \par Labs notable for negative RVP. alk phos was 244, SGOT 109, SGPT 132. \par VBG 7.35/66/58 \par \par \par F/U televisit today: She feels weak, mucous is thick and sticky,  no fever.  Using Colistin twice per day almost completed her course this month. WEight is 90lbs \par Using Hypersal twice daily.

## 2023-01-03 ENCOUNTER — RX RENEWAL (OUTPATIENT)
Age: 68
End: 2023-01-03

## 2023-03-29 ENCOUNTER — INPATIENT (INPATIENT)
Facility: HOSPITAL | Age: 68
LOS: 12 days | Discharge: HOME HEALTH | End: 2023-04-11
Attending: INTERNAL MEDICINE | Admitting: INTERNAL MEDICINE
Payer: MEDICAID

## 2023-03-29 ENCOUNTER — NON-APPOINTMENT (OUTPATIENT)
Age: 68
End: 2023-03-29

## 2023-03-29 VITALS
RESPIRATION RATE: 19 BRPM | OXYGEN SATURATION: 93 % | SYSTOLIC BLOOD PRESSURE: 153 MMHG | TEMPERATURE: 97 F | HEART RATE: 87 BPM | DIASTOLIC BLOOD PRESSURE: 80 MMHG

## 2023-03-29 DIAGNOSIS — J44.1 CHRONIC OBSTRUCTIVE PULMONARY DISEASE WITH (ACUTE) EXACERBATION: ICD-10-CM

## 2023-03-29 DIAGNOSIS — Z98.890 OTHER SPECIFIED POSTPROCEDURAL STATES: Chronic | ICD-10-CM

## 2023-03-29 DIAGNOSIS — Z90.49 ACQUIRED ABSENCE OF OTHER SPECIFIED PARTS OF DIGESTIVE TRACT: Chronic | ICD-10-CM

## 2023-03-29 PROCEDURE — 71046 X-RAY EXAM CHEST 2 VIEWS: CPT | Mod: 26

## 2023-03-29 PROCEDURE — 74177 CT ABD & PELVIS W/CONTRAST: CPT | Mod: 26

## 2023-03-29 PROCEDURE — 93010 ELECTROCARDIOGRAM REPORT: CPT

## 2023-03-29 PROCEDURE — 99285 EMERGENCY DEPT VISIT HI MDM: CPT

## 2023-03-30 DIAGNOSIS — I10 ESSENTIAL (PRIMARY) HYPERTENSION: ICD-10-CM

## 2023-03-30 DIAGNOSIS — K59.00 CONSTIPATION, UNSPECIFIED: ICD-10-CM

## 2023-03-30 DIAGNOSIS — Z29.9 ENCOUNTER FOR PROPHYLACTIC MEASURES, UNSPECIFIED: ICD-10-CM

## 2023-03-30 DIAGNOSIS — Q34.8 OTHER SPECIFIED CONGENITAL MALFORMATIONS OF RESPIRATORY SYSTEM: ICD-10-CM

## 2023-03-30 DIAGNOSIS — E87.1 HYPO-OSMOLALITY AND HYPONATREMIA: ICD-10-CM

## 2023-03-30 DIAGNOSIS — A41.9 SEPSIS, UNSPECIFIED ORGANISM: ICD-10-CM

## 2023-03-30 DIAGNOSIS — E11.9 TYPE 2 DIABETES MELLITUS WITHOUT COMPLICATIONS: ICD-10-CM

## 2023-03-30 LAB
A1C WITH ESTIMATED AVERAGE GLUCOSE RESULT: 8.8 % — HIGH (ref 4–5.6)
ALBUMIN SERPL ELPH-MCNC: 3.4 G/DL — SIGNIFICANT CHANGE UP (ref 3.3–5)
ALP SERPL-CCNC: 218 U/L — HIGH (ref 40–120)
ALT FLD-CCNC: 65 U/L — HIGH (ref 4–33)
ANION GAP SERPL CALC-SCNC: 5 MMOL/L — LOW (ref 7–14)
ANION GAP SERPL CALC-SCNC: 9 MMOL/L — SIGNIFICANT CHANGE UP (ref 7–14)
ANISOCYTOSIS BLD QL: SLIGHT — SIGNIFICANT CHANGE UP
AST SERPL-CCNC: 42 U/L — HIGH (ref 4–32)
BASOPHILS # BLD AUTO: 0 K/UL — SIGNIFICANT CHANGE UP (ref 0–0.2)
BASOPHILS NFR BLD AUTO: 0 % — SIGNIFICANT CHANGE UP (ref 0–2)
BILIRUB SERPL-MCNC: 0.3 MG/DL — SIGNIFICANT CHANGE UP (ref 0.2–1.2)
BUN SERPL-MCNC: 14 MG/DL — SIGNIFICANT CHANGE UP (ref 7–23)
BUN SERPL-MCNC: 15 MG/DL — SIGNIFICANT CHANGE UP (ref 7–23)
CALCIUM SERPL-MCNC: 9 MG/DL — SIGNIFICANT CHANGE UP (ref 8.4–10.5)
CALCIUM SERPL-MCNC: 9.1 MG/DL — SIGNIFICANT CHANGE UP (ref 8.4–10.5)
CHLORIDE SERPL-SCNC: 81 MMOL/L — LOW (ref 98–107)
CHLORIDE SERPL-SCNC: 90 MMOL/L — LOW (ref 98–107)
CHLORIDE UR-SCNC: <20 MMOL/L — SIGNIFICANT CHANGE UP
CO2 SERPL-SCNC: 33 MMOL/L — HIGH (ref 22–31)
CO2 SERPL-SCNC: 34 MMOL/L — HIGH (ref 22–31)
CREAT SERPL-MCNC: 0.38 MG/DL — LOW (ref 0.5–1.3)
CREAT SERPL-MCNC: 0.41 MG/DL — LOW (ref 0.5–1.3)
EGFR: 108 ML/MIN/1.73M2 — SIGNIFICANT CHANGE UP
EGFR: 110 ML/MIN/1.73M2 — SIGNIFICANT CHANGE UP
EOSINOPHIL # BLD AUTO: 0.14 K/UL — SIGNIFICANT CHANGE UP (ref 0–0.5)
EOSINOPHIL NFR BLD AUTO: 0.9 % — SIGNIFICANT CHANGE UP (ref 0–6)
ESTIMATED AVERAGE GLUCOSE: 206 — SIGNIFICANT CHANGE UP
GIANT PLATELETS BLD QL SMEAR: PRESENT — SIGNIFICANT CHANGE UP
GLUCOSE BLDC GLUCOMTR-MCNC: 174 MG/DL — HIGH (ref 70–99)
GLUCOSE BLDC GLUCOMTR-MCNC: 224 MG/DL — HIGH (ref 70–99)
GLUCOSE BLDC GLUCOMTR-MCNC: 297 MG/DL — HIGH (ref 70–99)
GLUCOSE SERPL-MCNC: 237 MG/DL — HIGH (ref 70–99)
GLUCOSE SERPL-MCNC: 366 MG/DL — HIGH (ref 70–99)
HCT VFR BLD CALC: 39.3 % — SIGNIFICANT CHANGE UP (ref 34.5–45)
HGB BLD-MCNC: 12.6 G/DL — SIGNIFICANT CHANGE UP (ref 11.5–15.5)
IANC: 10.9 K/UL — HIGH (ref 1.8–7.4)
LYMPHOCYTES # BLD AUTO: 16.5 % — SIGNIFICANT CHANGE UP (ref 13–44)
LYMPHOCYTES # BLD AUTO: 2.52 K/UL — SIGNIFICANT CHANGE UP (ref 1–3.3)
MAGNESIUM SERPL-MCNC: 2.1 MG/DL — SIGNIFICANT CHANGE UP (ref 1.6–2.6)
MAGNESIUM SERPL-MCNC: 2.2 MG/DL — SIGNIFICANT CHANGE UP (ref 1.6–2.6)
MANUAL SMEAR VERIFICATION: SIGNIFICANT CHANGE UP
MCHC RBC-ENTMCNC: 26.3 PG — LOW (ref 27–34)
MCHC RBC-ENTMCNC: 32.1 GM/DL — SIGNIFICANT CHANGE UP (ref 32–36)
MCV RBC AUTO: 81.9 FL — SIGNIFICANT CHANGE UP (ref 80–100)
MONOCYTES # BLD AUTO: 1.73 K/UL — HIGH (ref 0–0.9)
MONOCYTES NFR BLD AUTO: 11.3 % — SIGNIFICANT CHANGE UP (ref 2–14)
MYELOCYTES NFR BLD: 0.9 % — HIGH (ref 0–0)
NEUTROPHILS # BLD AUTO: 10.5 K/UL — HIGH (ref 1.8–7.4)
NEUTROPHILS NFR BLD AUTO: 68.7 % — SIGNIFICANT CHANGE UP (ref 43–77)
OSMOLALITY UR: 462 MOSM/KG — SIGNIFICANT CHANGE UP (ref 50–1200)
PHOSPHATE SERPL-MCNC: 2.5 MG/DL — SIGNIFICANT CHANGE UP (ref 2.5–4.5)
PHOSPHATE SERPL-MCNC: 2.6 MG/DL — SIGNIFICANT CHANGE UP (ref 2.5–4.5)
PLAT MORPH BLD: NORMAL — SIGNIFICANT CHANGE UP
PLATELET # BLD AUTO: 192 K/UL — SIGNIFICANT CHANGE UP (ref 150–400)
PLATELET COUNT - ESTIMATE: NORMAL — SIGNIFICANT CHANGE UP
POIKILOCYTOSIS BLD QL AUTO: SLIGHT — SIGNIFICANT CHANGE UP
POLYCHROMASIA BLD QL SMEAR: SLIGHT — SIGNIFICANT CHANGE UP
POTASSIUM SERPL-MCNC: 4.4 MMOL/L — SIGNIFICANT CHANGE UP (ref 3.5–5.3)
POTASSIUM SERPL-MCNC: 4.4 MMOL/L — SIGNIFICANT CHANGE UP (ref 3.5–5.3)
POTASSIUM SERPL-SCNC: 4.4 MMOL/L — SIGNIFICANT CHANGE UP (ref 3.5–5.3)
POTASSIUM SERPL-SCNC: 4.4 MMOL/L — SIGNIFICANT CHANGE UP (ref 3.5–5.3)
POTASSIUM UR-SCNC: 19.7 MMOL/L — SIGNIFICANT CHANGE UP
PROT SERPL-MCNC: 6.5 G/DL — SIGNIFICANT CHANGE UP (ref 6–8.3)
RBC # BLD: 4.8 M/UL — SIGNIFICANT CHANGE UP (ref 3.8–5.2)
RBC # FLD: 12.8 % — SIGNIFICANT CHANGE UP (ref 10.3–14.5)
RBC BLD AUTO: NORMAL — SIGNIFICANT CHANGE UP
SODIUM SERPL-SCNC: 123 MMOL/L — LOW (ref 135–145)
SODIUM SERPL-SCNC: 129 MMOL/L — LOW (ref 135–145)
SODIUM UR-SCNC: 20 MMOL/L — SIGNIFICANT CHANGE UP
STOMATOCYTES BLD QL SMEAR: SLIGHT — SIGNIFICANT CHANGE UP
URATE SERPL-MCNC: 2.8 MG/DL — SIGNIFICANT CHANGE UP (ref 2.5–7)
URATE UR-MCNC: 29.2 MG/DL — SIGNIFICANT CHANGE UP
VARIANT LYMPHS # BLD: 1.7 % — SIGNIFICANT CHANGE UP (ref 0–6)
WBC # BLD: 15.28 K/UL — HIGH (ref 3.8–10.5)
WBC # FLD AUTO: 15.28 K/UL — HIGH (ref 3.8–10.5)

## 2023-03-30 PROCEDURE — 99223 1ST HOSP IP/OBS HIGH 75: CPT

## 2023-03-30 PROCEDURE — 12345: CPT | Mod: NC

## 2023-03-30 PROCEDURE — 99223 1ST HOSP IP/OBS HIGH 75: CPT | Mod: GC

## 2023-03-30 RX ORDER — CEFEPIME 1 G/1
INJECTION, POWDER, FOR SOLUTION INTRAMUSCULAR; INTRAVENOUS
Refills: 0 | Status: DISCONTINUED | OUTPATIENT
Start: 2023-03-30 | End: 2023-04-06

## 2023-03-30 RX ORDER — IPRATROPIUM/ALBUTEROL SULFATE 18-103MCG
3 AEROSOL WITH ADAPTER (GRAM) INHALATION EVERY 6 HOURS
Refills: 0 | Status: DISCONTINUED | OUTPATIENT
Start: 2023-03-30 | End: 2023-03-30

## 2023-03-30 RX ORDER — PANTOPRAZOLE SODIUM 20 MG/1
40 TABLET, DELAYED RELEASE ORAL
Refills: 0 | Status: DISCONTINUED | OUTPATIENT
Start: 2023-03-30 | End: 2023-04-11

## 2023-03-30 RX ORDER — MIRTAZAPINE 45 MG/1
7.5 TABLET, ORALLY DISINTEGRATING ORAL AT BEDTIME
Refills: 0 | Status: DISCONTINUED | OUTPATIENT
Start: 2023-03-30 | End: 2023-03-30

## 2023-03-30 RX ORDER — LANOLIN ALCOHOL/MO/W.PET/CERES
3 CREAM (GRAM) TOPICAL AT BEDTIME
Refills: 0 | Status: DISCONTINUED | OUTPATIENT
Start: 2023-03-30 | End: 2023-04-05

## 2023-03-30 RX ORDER — CHLORHEXIDINE GLUCONATE 213 G/1000ML
1 SOLUTION TOPICAL DAILY
Refills: 0 | Status: DISCONTINUED | OUTPATIENT
Start: 2023-03-30 | End: 2023-04-10

## 2023-03-30 RX ORDER — IPRATROPIUM/ALBUTEROL SULFATE 18-103MCG
3 AEROSOL WITH ADAPTER (GRAM) INHALATION ONCE
Refills: 0 | Status: COMPLETED | OUTPATIENT
Start: 2023-03-30 | End: 2023-03-30

## 2023-03-30 RX ORDER — SODIUM CHLORIDE 9 MG/ML
1000 INJECTION, SOLUTION INTRAVENOUS
Refills: 0 | Status: DISCONTINUED | OUTPATIENT
Start: 2023-03-30 | End: 2023-04-01

## 2023-03-30 RX ORDER — SENNA PLUS 8.6 MG/1
2 TABLET ORAL AT BEDTIME
Refills: 0 | Status: DISCONTINUED | OUTPATIENT
Start: 2023-03-30 | End: 2023-04-11

## 2023-03-30 RX ORDER — MONTELUKAST 4 MG/1
10 TABLET, CHEWABLE ORAL DAILY
Refills: 0 | Status: DISCONTINUED | OUTPATIENT
Start: 2023-03-30 | End: 2023-04-11

## 2023-03-30 RX ORDER — INSULIN LISPRO 100/ML
5 VIAL (ML) SUBCUTANEOUS
Refills: 0 | Status: DISCONTINUED | OUTPATIENT
Start: 2023-03-30 | End: 2023-04-01

## 2023-03-30 RX ORDER — AMLODIPINE BESYLATE 2.5 MG/1
5 TABLET ORAL DAILY
Refills: 0 | Status: DISCONTINUED | OUTPATIENT
Start: 2023-03-30 | End: 2023-04-11

## 2023-03-30 RX ORDER — AZITHROMYCIN 500 MG/1
TABLET, FILM COATED ORAL
Refills: 0 | Status: DISCONTINUED | OUTPATIENT
Start: 2023-03-30 | End: 2023-03-31

## 2023-03-30 RX ORDER — POLYETHYLENE GLYCOL 3350 17 G/17G
17 POWDER, FOR SOLUTION ORAL
Refills: 0 | Status: DISCONTINUED | OUTPATIENT
Start: 2023-03-30 | End: 2023-04-11

## 2023-03-30 RX ORDER — KETOTIFEN FUMARATE 0.34 MG/ML
1 SOLUTION OPHTHALMIC
Refills: 0 | Status: DISCONTINUED | OUTPATIENT
Start: 2023-03-30 | End: 2023-04-11

## 2023-03-30 RX ORDER — CEFEPIME 1 G/1
2000 INJECTION, POWDER, FOR SOLUTION INTRAMUSCULAR; INTRAVENOUS EVERY 8 HOURS
Refills: 0 | Status: DISCONTINUED | OUTPATIENT
Start: 2023-03-30 | End: 2023-03-30

## 2023-03-30 RX ORDER — ACETAMINOPHEN 500 MG
650 TABLET ORAL EVERY 6 HOURS
Refills: 0 | Status: DISCONTINUED | OUTPATIENT
Start: 2023-03-30 | End: 2023-04-11

## 2023-03-30 RX ORDER — METOPROLOL TARTRATE 50 MG
1 TABLET ORAL
Qty: 0 | Refills: 0 | DISCHARGE

## 2023-03-30 RX ORDER — DEXTROSE 50 % IN WATER 50 %
15 SYRINGE (ML) INTRAVENOUS ONCE
Refills: 0 | Status: DISCONTINUED | OUTPATIENT
Start: 2023-03-30 | End: 2023-04-01

## 2023-03-30 RX ORDER — ONDANSETRON 8 MG/1
4 TABLET, FILM COATED ORAL EVERY 8 HOURS
Refills: 0 | Status: DISCONTINUED | OUTPATIENT
Start: 2023-03-30 | End: 2023-04-11

## 2023-03-30 RX ORDER — SODIUM CHLORIDE 9 MG/ML
4 INJECTION INTRAMUSCULAR; INTRAVENOUS; SUBCUTANEOUS EVERY 12 HOURS
Refills: 0 | Status: DISCONTINUED | OUTPATIENT
Start: 2023-03-30 | End: 2023-04-11

## 2023-03-30 RX ORDER — SENNOSIDES/DOCUSATE SODIUM 8.6MG-50MG
2 TABLET ORAL
Qty: 0 | Refills: 0 | DISCHARGE

## 2023-03-30 RX ORDER — INSULIN LISPRO 100/ML
VIAL (ML) SUBCUTANEOUS AT BEDTIME
Refills: 0 | Status: DISCONTINUED | OUTPATIENT
Start: 2023-03-30 | End: 2023-04-01

## 2023-03-30 RX ORDER — DEXTROSE 50 % IN WATER 50 %
12.5 SYRINGE (ML) INTRAVENOUS ONCE
Refills: 0 | Status: DISCONTINUED | OUTPATIENT
Start: 2023-03-30 | End: 2023-04-01

## 2023-03-30 RX ORDER — GUAIFENESIN/DEXTROMETHORPHAN 600MG-30MG
10 TABLET, EXTENDED RELEASE 12 HR ORAL EVERY 8 HOURS
Refills: 0 | Status: DISCONTINUED | OUTPATIENT
Start: 2023-03-30 | End: 2023-04-05

## 2023-03-30 RX ORDER — DEXTROSE 50 % IN WATER 50 %
25 SYRINGE (ML) INTRAVENOUS ONCE
Refills: 0 | Status: DISCONTINUED | OUTPATIENT
Start: 2023-03-30 | End: 2023-04-01

## 2023-03-30 RX ORDER — MIRTAZAPINE 45 MG/1
7.5 TABLET, ORALLY DISINTEGRATING ORAL AT BEDTIME
Refills: 0 | Status: DISCONTINUED | OUTPATIENT
Start: 2023-03-30 | End: 2023-04-11

## 2023-03-30 RX ORDER — GLUCAGON INJECTION, SOLUTION 0.5 MG/.1ML
1 INJECTION, SOLUTION SUBCUTANEOUS ONCE
Refills: 0 | Status: DISCONTINUED | OUTPATIENT
Start: 2023-03-30 | End: 2023-04-01

## 2023-03-30 RX ORDER — FLUTICASONE PROPIONATE 50 MCG
1 SPRAY, SUSPENSION NASAL
Refills: 0 | Status: DISCONTINUED | OUTPATIENT
Start: 2023-03-30 | End: 2023-04-11

## 2023-03-30 RX ORDER — PREGABALIN 225 MG/1
0 CAPSULE ORAL
Qty: 0 | Refills: 1 | DISCHARGE

## 2023-03-30 RX ORDER — ENOXAPARIN SODIUM 100 MG/ML
30 INJECTION SUBCUTANEOUS EVERY 24 HOURS
Refills: 0 | Status: DISCONTINUED | OUTPATIENT
Start: 2023-03-30 | End: 2023-04-11

## 2023-03-30 RX ORDER — OMEPRAZOLE AND SODIUM BICARBONATE 40; 1100 MG/1; MG/1
0 CAPSULE, GELATIN COATED ORAL
Qty: 0 | Refills: 1 | DISCHARGE

## 2023-03-30 RX ORDER — CEFEPIME 1 G/1
2000 INJECTION, POWDER, FOR SOLUTION INTRAMUSCULAR; INTRAVENOUS EVERY 12 HOURS
Refills: 0 | Status: DISCONTINUED | OUTPATIENT
Start: 2023-03-31 | End: 2023-04-06

## 2023-03-30 RX ORDER — INSULIN LISPRO 100/ML
4 VIAL (ML) SUBCUTANEOUS ONCE
Refills: 0 | Status: COMPLETED | OUTPATIENT
Start: 2023-03-30 | End: 2023-03-30

## 2023-03-30 RX ORDER — MIRTAZAPINE 45 MG/1
1 TABLET, ORALLY DISINTEGRATING ORAL
Refills: 0 | DISCHARGE

## 2023-03-30 RX ORDER — TIOTROPIUM BROMIDE 18 UG/1
2 CAPSULE ORAL; RESPIRATORY (INHALATION) DAILY
Refills: 0 | Status: DISCONTINUED | OUTPATIENT
Start: 2023-03-30 | End: 2023-03-30

## 2023-03-30 RX ORDER — SIMETHICONE 80 MG/1
0 TABLET, CHEWABLE ORAL
Qty: 0 | Refills: 0 | DISCHARGE

## 2023-03-30 RX ORDER — FERROUS GLUCONATE 100 %
0 POWDER (GRAM) MISCELLANEOUS
Qty: 0 | Refills: 5 | DISCHARGE

## 2023-03-30 RX ORDER — IPRATROPIUM/ALBUTEROL SULFATE 18-103MCG
3 AEROSOL WITH ADAPTER (GRAM) INHALATION EVERY 6 HOURS
Refills: 0 | Status: DISCONTINUED | OUTPATIENT
Start: 2023-03-30 | End: 2023-03-31

## 2023-03-30 RX ORDER — FLUTICASONE PROPIONATE 50 MCG
1 SPRAY, SUSPENSION NASAL
Qty: 0 | Refills: 0 | DISCHARGE

## 2023-03-30 RX ORDER — INSULIN GLARGINE 100 [IU]/ML
8 INJECTION, SOLUTION SUBCUTANEOUS AT BEDTIME
Refills: 0 | Status: DISCONTINUED | OUTPATIENT
Start: 2023-03-30 | End: 2023-04-01

## 2023-03-30 RX ORDER — AZITHROMYCIN 500 MG/1
500 TABLET, FILM COATED ORAL EVERY 24 HOURS
Refills: 0 | Status: DISCONTINUED | OUTPATIENT
Start: 2023-03-31 | End: 2023-03-31

## 2023-03-30 RX ORDER — INSULIN LISPRO 100/ML
VIAL (ML) SUBCUTANEOUS
Refills: 0 | Status: DISCONTINUED | OUTPATIENT
Start: 2023-03-30 | End: 2023-04-01

## 2023-03-30 RX ORDER — ALBUTEROL 90 UG/1
2 AEROSOL, METERED ORAL EVERY 6 HOURS
Refills: 0 | Status: DISCONTINUED | OUTPATIENT
Start: 2023-03-30 | End: 2023-03-30

## 2023-03-30 RX ORDER — BUDESONIDE AND FORMOTEROL FUMARATE DIHYDRATE 160; 4.5 UG/1; UG/1
2 AEROSOL RESPIRATORY (INHALATION)
Refills: 0 | Status: DISCONTINUED | OUTPATIENT
Start: 2023-03-30 | End: 2023-04-11

## 2023-03-30 RX ORDER — AZITHROMYCIN 500 MG/1
500 TABLET, FILM COATED ORAL ONCE
Refills: 0 | Status: COMPLETED | OUTPATIENT
Start: 2023-03-30 | End: 2023-03-30

## 2023-03-30 RX ORDER — KETOTIFEN FUMARATE 0.34 MG/ML
1 SOLUTION OPHTHALMIC
Qty: 0 | Refills: 0 | DISCHARGE

## 2023-03-30 RX ORDER — ACETYLCYSTEINE 200 MG/ML
4 VIAL (ML) MISCELLANEOUS
Refills: 0 | Status: DISCONTINUED | OUTPATIENT
Start: 2023-03-30 | End: 2023-04-08

## 2023-03-30 RX ORDER — INSULIN GLARGINE 100 [IU]/ML
4 INJECTION, SOLUTION SUBCUTANEOUS ONCE
Refills: 0 | Status: DISCONTINUED | OUTPATIENT
Start: 2023-03-30 | End: 2023-03-30

## 2023-03-30 RX ORDER — NYSTATIN 500MM UNIT
500000 POWDER (EA) MISCELLANEOUS
Refills: 0 | Status: DISCONTINUED | OUTPATIENT
Start: 2023-03-30 | End: 2023-04-11

## 2023-03-30 RX ORDER — CEFEPIME 1 G/1
2000 INJECTION, POWDER, FOR SOLUTION INTRAMUSCULAR; INTRAVENOUS ONCE
Refills: 0 | Status: COMPLETED | OUTPATIENT
Start: 2023-03-30 | End: 2023-03-30

## 2023-03-30 RX ORDER — INFLUENZA VIRUS VACCINE 15; 15; 15; 15 UG/.5ML; UG/.5ML; UG/.5ML; UG/.5ML
0.7 SUSPENSION INTRAMUSCULAR ONCE
Refills: 0 | Status: DISCONTINUED | OUTPATIENT
Start: 2023-03-30 | End: 2023-04-11

## 2023-03-30 RX ADMIN — ENOXAPARIN SODIUM 30 MILLIGRAM(S): 100 INJECTION SUBCUTANEOUS at 19:25

## 2023-03-30 RX ADMIN — POLYETHYLENE GLYCOL 3350 17 GRAM(S): 17 POWDER, FOR SOLUTION ORAL at 18:20

## 2023-03-30 RX ADMIN — Medication 3: at 12:38

## 2023-03-30 RX ADMIN — Medication 2: at 18:15

## 2023-03-30 RX ADMIN — MONTELUKAST 10 MILLIGRAM(S): 4 TABLET, CHEWABLE ORAL at 11:55

## 2023-03-30 RX ADMIN — Medication 500000 UNIT(S): at 07:27

## 2023-03-30 RX ADMIN — MIRTAZAPINE 7.5 MILLIGRAM(S): 45 TABLET, ORALLY DISINTEGRATING ORAL at 22:35

## 2023-03-30 RX ADMIN — INSULIN GLARGINE 8 UNIT(S): 100 INJECTION, SOLUTION SUBCUTANEOUS at 22:34

## 2023-03-30 RX ADMIN — SODIUM CHLORIDE 4 MILLILITER(S): 9 INJECTION INTRAMUSCULAR; INTRAVENOUS; SUBCUTANEOUS at 21:18

## 2023-03-30 RX ADMIN — KETOTIFEN FUMARATE 1 DROP(S): 0.34 SOLUTION OPHTHALMIC at 12:09

## 2023-03-30 RX ADMIN — AZITHROMYCIN 500 MILLIGRAM(S): 500 TABLET, FILM COATED ORAL at 09:07

## 2023-03-30 RX ADMIN — CEFEPIME 100 MILLIGRAM(S): 1 INJECTION, POWDER, FOR SOLUTION INTRAMUSCULAR; INTRAVENOUS at 19:25

## 2023-03-30 RX ADMIN — PANTOPRAZOLE SODIUM 40 MILLIGRAM(S): 20 TABLET, DELAYED RELEASE ORAL at 07:26

## 2023-03-30 RX ADMIN — Medication 1 SPRAY(S): at 07:28

## 2023-03-30 RX ADMIN — BUDESONIDE AND FORMOTEROL FUMARATE DIHYDRATE 2 PUFF(S): 160; 4.5 AEROSOL RESPIRATORY (INHALATION) at 20:45

## 2023-03-30 RX ADMIN — Medication 5 UNIT(S): at 12:40

## 2023-03-30 RX ADMIN — Medication 500000 UNIT(S): at 18:20

## 2023-03-30 RX ADMIN — Medication 3 MILLILITER(S): at 21:18

## 2023-03-30 RX ADMIN — POLYETHYLENE GLYCOL 3350 17 GRAM(S): 17 POWDER, FOR SOLUTION ORAL at 07:39

## 2023-03-30 RX ADMIN — AMLODIPINE BESYLATE 5 MILLIGRAM(S): 2.5 TABLET ORAL at 07:26

## 2023-03-30 RX ADMIN — Medication 1 SPRAY(S): at 18:17

## 2023-03-30 RX ADMIN — TIOTROPIUM BROMIDE 2 PUFF(S): 18 CAPSULE ORAL; RESPIRATORY (INHALATION) at 11:51

## 2023-03-30 RX ADMIN — Medication 500000 UNIT(S): at 23:51

## 2023-03-30 RX ADMIN — Medication 3 MILLILITER(S): at 15:27

## 2023-03-30 RX ADMIN — SENNA PLUS 2 TABLET(S): 8.6 TABLET ORAL at 22:34

## 2023-03-30 RX ADMIN — CHLORHEXIDINE GLUCONATE 1 APPLICATION(S): 213 SOLUTION TOPICAL at 13:08

## 2023-03-30 RX ADMIN — Medication 4 UNIT(S): at 08:51

## 2023-03-30 RX ADMIN — BUDESONIDE AND FORMOTEROL FUMARATE DIHYDRATE 2 PUFF(S): 160; 4.5 AEROSOL RESPIRATORY (INHALATION) at 09:23

## 2023-03-30 RX ADMIN — Medication 5 UNIT(S): at 18:14

## 2023-03-30 RX ADMIN — Medication 500000 UNIT(S): at 11:55

## 2023-03-30 RX ADMIN — Medication 4: at 08:50

## 2023-03-30 NOTE — CONSULT NOTE ADULT - ATTENDING COMMENTS
67 year old female with primary ciliary dyskinesia, bronchiectasis, and chronic hypoxemic and hypercapnic respiratory on AVAPS who presents with abdominal pain and increased sputum production on treatment for acute exacerbation of her bronchiectasis.   - Check sputum culture  - Aggressive airway clearance, duonebs q6, hypersal, chest vest and Aerobika Q12  - Start Cefepime and will tailor abx based on culture results. Can hold colistin nebs while on IV abx.  - Continue AVAPS qHS.

## 2023-03-30 NOTE — H&P ADULT - HISTORY OF PRESENT ILLNESS
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 Son at bedside assisted w/ the HPI.    Pt has been having difficulty clearing mucus and feeling congested starting yesterday AM. She felt difficulty breathing as "her lungs felt squeezed". She became dyspnea w/ 3.5L O2 (Baseline 2.5L) and desatted to 80%. No fevers, no sick contact, and no increase in albuterol use frequency (4-5x/d).    In addition, she reported chronic constipation as she described her abdomen as "tight". Her last BM was 1 wk ago but she has daily small BM pellets as well.    Her PCP prescribed her lasix for HTN for which she started taking only yesterday. Her appetite has been poor and she is eating less and less since last week. Son endorsed a 6lbs wt loss in the last 4 months.    In the ED, VS significant for O2 sat dropped to 80s when admitted, improved on 3L to 100%. WBC 15k, lactate 2.2, pCO2 74, Na 117. Received duoneb, 1L NS, ceftriaxone, solumedrol.

## 2023-03-30 NOTE — H&P ADULT - PROBLEM SELECTOR PLAN 6
- will hold lasix iso hypo-Na  - per son: not sure about losartan or metoprolol as her medication  - need to f/u accuracy of med rec - will hold lasix iso hypo-Na  - per son: not sure about losartan or metoprolol as her medication, he will verify w/ the day team  - need to f/u accuracy of med rec  - c/w amlodipine 5 w/ holding parameters

## 2023-03-30 NOTE — ED ADULT NURSE REASSESSMENT NOTE - NS ED NURSE REASSESS COMMENT FT1
Pt at baseline mental status, breathing even and unlabored in bed. Pt denies chest pain, SOB, dizziness, headache, blurry vision, chills. Bed in lowest position, pt laying comfortable in bed with 3L NC.

## 2023-03-30 NOTE — H&P ADULT - PROBLEM SELECTOR PLAN 2
- Hx of PCD, difficulty clearing mucus per pt  - aggressive pulm toileting, including chest PT, mucomyst, hypersal, chest vest, etc  - c/w symbicort, singular  - will consult pulm in the AM as pt known to pulm clinic  - will need CT chest as CT A/P only had lower chest image showing consolidation

## 2023-03-30 NOTE — PROGRESS NOTE ADULT - PROBLEM SELECTOR PLAN 4
- Na 117 at admission, baseline 135 outpatient  - ddx include Legionaire disease considering respiratory distress vs poor PO intake vs SIADH  - will need urine studies to identify etiology of hypo-Na  - fluid restriction 1.2L - Na 117 at admission, (123 w/ correction)  baseline 135 outpatient  - ddx include Legionaire disease considering respiratory distress vs poor PO intake vs SIADH  - s/p 1L of NS in the ED   - fluid restriction 1.2L  - Na with correction of 127 today  - f/u hyponatremia workup

## 2023-03-30 NOTE — PROGRESS NOTE ADULT - SUBJECTIVE AND OBJECTIVE BOX
**************************************  Agnes Jorge, PGY-1  After 7PM, please contact night float at #[phone] or [phone]  **************************************    INTERVAL HPI/OVERNIGHT EVENTS:  Patient was seen and examined at bedside.       VITAL SIGNS:  T(F): 97.9 (03-30-23 @ 07:15)  HR: 87 (03-30-23 @ 07:15)  BP: 160/74 (03-30-23 @ 07:15)  RR: 17 (03-30-23 @ 07:15)  SpO2: 99% (03-30-23 @ 07:15)  Wt(kg): --    GENERAL: NAD, lying in bed comfortably, cachetic appearing  HEAD:  Atraumatic, normocephalic  EYES: EOMI, PERRLA, conjunctiva and sclera clear  ENT: Moist mucous membranes  NECK: Supple, no JVD  HEART: Regular rate and rhythm, no murmurs, rubs, or gallops  LUNGS: b/l coarse breath sounds and expiratory wheezes  ABDOMEN: Soft, nontender, nondistended, +BS  EXTREMITIES: 2+ peripheral pulses bilaterally. No clubbing, cyanosis, or edema  NERVOUS SYSTEM:  A&Ox3, no focal deficits   SKIN: No rashes or lesions      MEDICATIONS  (STANDING):  acetylcysteine 10%  Inhalation 4 milliLiter(s) Inhalation two times a day  albuterol/ipratropium for Nebulization 3 milliLiter(s) Nebulizer every 6 hours  albuterol/ipratropium for Nebulization. 3 milliLiter(s) Nebulizer once  amLODIPine   Tablet 5 milliGRAM(s) Oral daily  azithromycin  IVPB      budesonide 160 MICROgram(s)/formoterol 4.5 MICROgram(s) Inhaler 2 Puff(s) Inhalation two times a day  dextrose 5%. 1000 milliLiter(s) (50 mL/Hr) IV Continuous <Continuous>  dextrose 5%. 1000 milliLiter(s) (100 mL/Hr) IV Continuous <Continuous>  dextrose 50% Injectable 25 Gram(s) IV Push once  dextrose 50% Injectable 12.5 Gram(s) IV Push once  dextrose 50% Injectable 25 Gram(s) IV Push once  dextrose Oral Gel 15 Gram(s) Oral once  enoxaparin Injectable 30 milliGRAM(s) SubCutaneous every 24 hours  fluticasone propionate 50 MICROgram(s)/spray Nasal Spray 1 Spray(s) Both Nostrils two times a day  glucagon  Injectable 1 milliGRAM(s) IntraMuscular once  insulin glargine Injectable (LANTUS) 8 Unit(s) SubCutaneous at bedtime  insulin lispro (ADMELOG) corrective regimen sliding scale   SubCutaneous three times a day before meals  insulin lispro (ADMELOG) corrective regimen sliding scale   SubCutaneous at bedtime  insulin lispro Injectable (ADMELOG) 5 Unit(s) SubCutaneous three times a day before meals  ketotifen 0.025% Ophthalmic Solution 1 Drop(s) Both EYES two times a day  mirtazapine 7.5 milliGRAM(s) Oral at bedtime  montelukast 10 milliGRAM(s) Oral daily  nystatin    Suspension 167295 Unit(s) Oral four times a day  pantoprazole    Tablet 40 milliGRAM(s) Oral before breakfast  polyethylene glycol 3350 17 Gram(s) Oral two times a day  senna 2 Tablet(s) Oral at bedtime  sodium chloride 3%  Inhalation 4 milliLiter(s) Inhalation every 12 hours  tiotropium 2.5 MICROgram(s) Inhaler 2 Puff(s) Inhalation daily    MEDICATIONS  (PRN):  acetaminophen     Tablet .. 650 milliGRAM(s) Oral every 6 hours PRN Temp greater or equal to 38C (100.4F), Mild Pain (1 - 3)  aluminum hydroxide/magnesium hydroxide/simethicone Suspension 30 milliLiter(s) Oral every 4 hours PRN Dyspepsia  bisacodyl Suppository 10 milliGRAM(s) Rectal daily PRN Constipation  guaifenesin/dextromethorphan Oral Liquid 10 milliLiter(s) Oral every 8 hours PRN Cough  melatonin 3 milliGRAM(s) Oral at bedtime PRN Insomnia  ondansetron Injectable 4 milliGRAM(s) IV Push every 8 hours PRN Nausea and/or Vomiting      Allergies    No Known Allergies    Intolerances        LABS:                        12.6   15.28 )-----------( 192      ( 30 Mar 2023 09:45 )             39.3     03-30    x   |  x   |  14  ----------------------------<  366<H>  x    |  33<H>  |  0.38<L>    Ca    9.1      30 Mar 2023 09:45  Phos  2.5     03-30  Mg     2.20     03-30    TPro  6.5  /  Alb  3.4  /  TBili  0.3  /  DBili  x   /  AST  42<H>  /  ALT  65<H>  /  AlkPhos  218<H>  03-30    PT/INR - ( 29 Mar 2023 15:20 )   PT: 11.5 sec;   INR: 0.99 ratio         PTT - ( 29 Mar 2023 15:20 )  PTT:25.7 sec      RADIOLOGY & ADDITIONAL TESTS:  Reviewed **************************************  Agnes Jorge, PGY-1  After 7PM, please contact night float at #[phone] or [phone]  **************************************    INTERVAL HPI/OVERNIGHT EVENTS:  Patient was seen and examined at bedside. [facility]  (ID:[id number]). Patient endorsing a sensation of tightness and shortness of breath this AM. Patient endorses having BM this AM. Patient denies fevers/chills, CP, abdominal pain. Patient endorses passing gas. Patient endorsing decreased PO intake for the past few days.       VITAL SIGNS:  T(F): 97.9 (03-30-23 @ 07:15)  HR: 87 (03-30-23 @ 07:15)  BP: 160/74 (03-30-23 @ 07:15)  RR: 17 (03-30-23 @ 07:15)  SpO2: 99% (03-30-23 @ 07:15)  Wt(kg): --    GENERAL: NAD, lying in bed comfortably, cachetic appearing  HEAD:  Atraumatic, normocephalic  EYES: EOMI, PERRLA, conjunctiva and sclera clear  ENT: Moist mucous membranes  NECK: Supple, no JVD  HEART: Regular rate and rhythm, no murmurs, rubs, or gallops  LUNGS: b/l coarse breath sounds and expiratory wheezes  ABDOMEN: Soft, nontender, nondistended, +BS  EXTREMITIES: 2+ peripheral pulses bilaterally. No clubbing, cyanosis, or edema  NERVOUS SYSTEM:  A&Ox3, no focal deficits   SKIN: No rashes or lesions      MEDICATIONS  (STANDING):  acetylcysteine 10%  Inhalation 4 milliLiter(s) Inhalation two times a day  albuterol/ipratropium for Nebulization 3 milliLiter(s) Nebulizer every 6 hours  albuterol/ipratropium for Nebulization. 3 milliLiter(s) Nebulizer once  amLODIPine   Tablet 5 milliGRAM(s) Oral daily  azithromycin  IVPB      budesonide 160 MICROgram(s)/formoterol 4.5 MICROgram(s) Inhaler 2 Puff(s) Inhalation two times a day  dextrose 5%. 1000 milliLiter(s) (50 mL/Hr) IV Continuous <Continuous>  dextrose 5%. 1000 milliLiter(s) (100 mL/Hr) IV Continuous <Continuous>  dextrose 50% Injectable 25 Gram(s) IV Push once  dextrose 50% Injectable 12.5 Gram(s) IV Push once  dextrose 50% Injectable 25 Gram(s) IV Push once  dextrose Oral Gel 15 Gram(s) Oral once  enoxaparin Injectable 30 milliGRAM(s) SubCutaneous every 24 hours  fluticasone propionate 50 MICROgram(s)/spray Nasal Spray 1 Spray(s) Both Nostrils two times a day  glucagon  Injectable 1 milliGRAM(s) IntraMuscular once  insulin glargine Injectable (LANTUS) 8 Unit(s) SubCutaneous at bedtime  insulin lispro (ADMELOG) corrective regimen sliding scale   SubCutaneous three times a day before meals  insulin lispro (ADMELOG) corrective regimen sliding scale   SubCutaneous at bedtime  insulin lispro Injectable (ADMELOG) 5 Unit(s) SubCutaneous three times a day before meals  ketotifen 0.025% Ophthalmic Solution 1 Drop(s) Both EYES two times a day  mirtazapine 7.5 milliGRAM(s) Oral at bedtime  montelukast 10 milliGRAM(s) Oral daily  nystatin    Suspension 122921 Unit(s) Oral four times a day  pantoprazole    Tablet 40 milliGRAM(s) Oral before breakfast  polyethylene glycol 3350 17 Gram(s) Oral two times a day  senna 2 Tablet(s) Oral at bedtime  sodium chloride 3%  Inhalation 4 milliLiter(s) Inhalation every 12 hours  tiotropium 2.5 MICROgram(s) Inhaler 2 Puff(s) Inhalation daily    MEDICATIONS  (PRN):  acetaminophen     Tablet .. 650 milliGRAM(s) Oral every 6 hours PRN Temp greater or equal to 38C (100.4F), Mild Pain (1 - 3)  aluminum hydroxide/magnesium hydroxide/simethicone Suspension 30 milliLiter(s) Oral every 4 hours PRN Dyspepsia  bisacodyl Suppository 10 milliGRAM(s) Rectal daily PRN Constipation  guaifenesin/dextromethorphan Oral Liquid 10 milliLiter(s) Oral every 8 hours PRN Cough  melatonin 3 milliGRAM(s) Oral at bedtime PRN Insomnia  ondansetron Injectable 4 milliGRAM(s) IV Push every 8 hours PRN Nausea and/or Vomiting      Allergies    No Known Allergies    Intolerances        LABS:                        12.6   15.28 )-----------( 192      ( 30 Mar 2023 09:45 )             39.3     03-30    x   |  x   |  14  ----------------------------<  366<H>  x    |  33<H>  |  0.38<L>    Ca    9.1      30 Mar 2023 09:45  Phos  2.5     03-30  Mg     2.20     03-30    TPro  6.5  /  Alb  3.4  /  TBili  0.3  /  DBili  x   /  AST  42<H>  /  ALT  65<H>  /  AlkPhos  218<H>  03-30    PT/INR - ( 29 Mar 2023 15:20 )   PT: 11.5 sec;   INR: 0.99 ratio         PTT - ( 29 Mar 2023 15:20 )  PTT:25.7 sec      RADIOLOGY & ADDITIONAL TESTS:  Reviewed

## 2023-03-30 NOTE — OCCUPATIONAL THERAPY INITIAL EVALUATION ADULT - GENERAL OBSERVATIONS, REHAB EVAL
Patient received semisupine in bed in NAD; agreeable to participate in OT evaluation. +O2 via NC. SpO2 100% on 3L O2 via NC,  bpm.

## 2023-03-30 NOTE — H&P ADULT - PROBLEM SELECTOR PLAN 3
- pt constipated per HPI and CT scan  - no bowel obstruction appreciated on exam, abd soft non-tender  - senna, miralax  - if needed can try dulcolax supp, oral Mg or even tap water enema

## 2023-03-30 NOTE — OCCUPATIONAL THERAPY INITIAL EVALUATION ADULT - PERTINENT HX OF CURRENT PROBLEM, REHAB EVAL
68 year old Female with history of 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 frequent episodes of admission due to bronchiectasis, HTN, chronic constipation, presented to the ED due to SOB. Found to have sepsis and hyponatremia.

## 2023-03-30 NOTE — H&P ADULT - ATTENDING COMMENTS
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 Presenting with increasing O2 requirements over last few days. Mod hypercapnia  but pt alert ant talkative. CXR prelim neg for new consolidations; would obtain CT chest for further  eval. Has received ceftriaxone and azithromycin so far; would obtain ID/Pulm input given recent MDR bacteria that required polymixin treatment; repeat gas, AVAPS prn in addition to standing qhs AVAPS. lantus home regimen/ ISS, adjust as needed.   Hyponatremia 2/2 volume depletion vs siadh; await Efren, fluid restrict for now

## 2023-03-30 NOTE — PROGRESS NOTE ADULT - PROBLEM SELECTOR PLAN 2
- Hx of PCD, difficulty clearing mucus per pt  - aggressive pulm toileting, including chest PT, mucomyst, hypersal, chest vest, etc  - c/w symbicort, singular  - will consult pulm in the AM as pt known to pulm clinic  - will need CT chest as CT A/P only had lower chest image showing consolidation - Hx of PCD, difficulty clearing mucus per pt  - aggressive pulm toileting, including chest PT, mucomyst, hypersal, chest vest q12h, aerobika q12h   - c/w symbicort, singular  - pulm consulted, follow recs   - f/u CT chest w/ IV contrast

## 2023-03-30 NOTE — PHYSICAL THERAPY INITIAL EVALUATION ADULT - PERTINENT HX OF CURRENT PROBLEM, REHAB EVAL
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

## 2023-03-30 NOTE — OCCUPATIONAL THERAPY INITIAL EVALUATION ADULT - ADDITIONAL COMMENTS
Patient's daughter provided social history over the phone. Patient lives in a private home with her son and . Patient needs assistance with all ADLs from her family. Able to walk minimally with 1 person assistance.

## 2023-03-30 NOTE — H&P ADULT - PROBLEM SELECTOR PLAN 7
- DVT: lovenox  - diet: soft and bite sized per son  - dispo: pending PT/OT - DVT: lovenox  - diet: soft and bite sized per son w/ 1.2L fluid restriction  - dispo: pending PT/OT

## 2023-03-30 NOTE — H&P ADULT - PROBLEM SELECTOR PLAN 1
- pt met sepsis criteria due to elevated WBC, increased WOB (RR), and elevated lactate  - s/p ceftriaxone in the ED  - outpt record showed colonization of sputum w/ Pseudomonas and Klebsiella  - will broaden to zosyn and azithromycin  - f/u urine legionella  - f/u blood culture and sputum culture, f/u VBG - pt met sepsis criteria due to elevated WBC, increased WOB (RR), and elevated lactate  - s/p ceftriaxone in the ED  - outpt record showed colonization of sputum w/ Pseudomonas and Klebsiella  - will broaden to azithromycin, hold off zosyn for now, consult ID  - f/u urine legionella  - f/u blood culture and sputum culture, f/u VBG

## 2023-03-30 NOTE — PATIENT PROFILE ADULT - NSPRESCRALCFREQ_GEN_A_NUR
Patient was advised of lab results and orders. He reports the Prednisone does help and will follow up with Dr. Addison Chapman for the right ankle pain for which he has been seen previously. All questions answered and patient verbalized complete understanding. Never

## 2023-03-30 NOTE — PROGRESS NOTE ADULT - ATTENDING COMMENTS
68F with PMH of primary ciliary dyskinesia, chronic hypoxic respiratory failure on Home O2 and AVAPS qHS, HTN, bronchietasis, chronic constipation presenting with SOB. Initial CT A/P showed large stool burden w/o obstruction. Visualized lung showed mucous plugging and bronchiectasis. Initial labs w/ leukocytosis, hyponatremia (down to 117), hyperglycemia 300s, mildly elevated lactate. RVP was negative. Admit for further management of acute hypoxic respiratory failure.    Pt seen at bedside.  services utilized. Reporting congestion and tightness around chest. On exam, decreased air entry throughout. Appears older than stated age. Malnourished. Labs notable for interval improvement of Na.     #Acute hypoxic respiratory failure 2’ primary ciliary dyskinesia  #SIRS, r/o infection/PNA  #Constipation  #Hyponatremia  #DM2  #HTN  #Severe protein calorie malnutrition    -ID/pulm recs appreciated.  -Chest PT, pulm toilet.  -Serial BMP to monitor Na. etiology not entirely clear – could be hypovolemic hyponatremia and possibly SIADH – Check urine studies. Avoid correction of >8 meq in 24h.  -Aggressive bowel regimen to promo BM.    Rest of plan as outlined above. 68F with PMH of primary ciliary dyskinesia, chronic hypoxic respiratory failure on Home O2 and AVAPS qHS, HTN, bronchietasis, chronic constipation presenting with SOB. Initial CT A/P showed large stool burden w/o obstruction. Visualized lung showed mucous plugging and bronchiectasis. Initial labs w/ leukocytosis, hyponatremia (down to 117), hyperglycemia 300s, mildly elevated lactate. RVP was negative. Admit for further management of acute hypoxic respiratory failure.    Pt seen at bedside.  services utilized. Reporting congestion and tightness around chest. On exam, decreased air entry throughout. Appears older than stated age. Malnourished. Labs notable for interval improvement of Na.     #Acute on chronic hypoxic respiratory failure 2’ primary ciliary dyskinesia  #Sepsis 2' suspect PNA [POA]  #Constipation  #Hyponatremia  #DM2  #HTN  #Severe protein calorie malnutrition    -ID/pulm recs appreciated.  -Chest PT, pulm toilet.  -Serial BMP to monitor Na. etiology not entirely clear – could be hypovolemic hyponatremia and possibly SIADH – Check urine studies. Avoid correction of >8 meq in 24h.  -Aggressive bowel regimen to promo BM.    Rest of plan as outlined above.

## 2023-03-30 NOTE — PHYSICAL THERAPY INITIAL EVALUATION ADULT - ADDITIONAL COMMENTS
spo2 assessed prior to session to be 100% on 3 liters of supplemental o2 via NC, Heart rate assessed to be 104 bpm. After ambulation Spo2 assessed to be 97% on 3 liters of supplemental o2 via NC Heart rate assessed to be 132bpm. RN aware. Pt. left seated in chair with all tubes/lines intact, call bell in reach and in NAD.     Daughter provided translation services as per pt. request

## 2023-03-30 NOTE — PROGRESS NOTE ADULT - PROBLEM SELECTOR PLAN 7
- DVT: lovenox  - diet: soft and bite sized per son w/ 1.2L fluid restriction  - dispo: pending PT/OT

## 2023-03-30 NOTE — H&P ADULT - NSHPREVIEWOFSYSTEMS_GEN_ALL_CORE
CONSTITUTIONAL: No weakness, fevers or chills  EYES/ENT: No visual changes;  No vertigo or throat pain   NECK: No pain or stiffness  RESPIRATORY: + SOB, congestion, difficulty clearing mucus  CARDIOVASCULAR: No chest pain or palpitations  GASTROINTESTINAL: No abdominal or epigastric pain. No nausea, vomiting, or hematemesis; +constipation. No melena or hematochezia.  GENITOURINARY: No dysuria, frequency or hematuria  NEUROLOGICAL: No numbness or weakness  SKIN: No itching, burning, rashes, or lesions   PSYCH: no hx depression, no hx anxiety

## 2023-03-30 NOTE — PROGRESS NOTE ADULT - ASSESSMENT
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

## 2023-03-30 NOTE — H&P ADULT - NSHPLABSRESULTS_GEN_ALL_CORE
03-29    117<LL>  |  75<L>  |  15  ----------------------------<  336<H>  4.6   |  32<H>  |  0.41<L>    Ca    9.2      29 Mar 2023 15:20  Mg     1.90     03-29    TPro  7.4  /  Alb  4.0  /  TBili  0.4  /  DBili  x   /  AST  44<H>  /  ALT  64<H>  /  AlkPhos  259<H>  03-29      PT/INR - ( 29 Mar 2023 15:20 )   PT: 11.5 sec;   INR: 0.99 ratio         PTT - ( 29 Mar 2023 15:20 )  PTT:25.7 sec                                        14.0   15.24 )-----------( 179      ( 29 Mar 2023 15:20 )             43.2     CAPILLARY BLOOD GLUCOSE      POCT Blood Glucose.: 321 mg/dL (29 Mar 2023 17:25)  POCT Blood Glucose.: 360 mg/dL (29 Mar 2023 13:13)    Blood Gas Profile - Venous (03.29.23 @ 15:20)    pH, Venous: 7.33: Due to specimen delivery delays, please interpret with caution    pCO2, Venous: 74 mmHg    pO2, Venous: 34 mmHg    HCO3, Venous: 39 mmol/L    Base Excess, Venous: 9.8 mmol/L    Oxygen Saturation, Venous: 63.0 %    Total CO2, Venous: 41.3 mmol/L

## 2023-03-30 NOTE — H&P ADULT - NSICDXPASTMEDICALHX_GEN_ALL_CORE_FT
PAST MEDICAL HISTORY:  ABPA (allergic bronchopulmonary aspergillosis)     Allergic rhinitis     Anxiety and depression     Bronchiectasis     Bronchiectasis with acute exacerbation     GERD (gastroesophageal reflux disease)     Hypertension     Microcytic anemia     Postnasal drip     Pseudomonas aeruginosa colonization     Recurrent pneumonia     Type 2 diabetes mellitus, with long-term current use of insulin     Vitamin D deficiency

## 2023-03-30 NOTE — PATIENT PROFILE ADULT - NSPROMEDSDISPOSITION_GEN_A_NUR
Chief Complaint   Patient presents with   • Office Visit     HTn and med check.      HISTORY OF PRESENT ILLNESS:   Ondina presents for follow up for her HTN. She is emotionally upset. She states her daughter, Earlene was admitted today to [facility] today for the 3rd time in 3 months.     Hypertension:  Patient's blood pressure is controlled today.She does occasionally check blood pressure at work. Patient is taking norvasc 7.5 mg and lisinopril 40 mg daily as prescribed and has no side effects. She denies any chest pain, palpitations, headaches, dizziness, lightheadedness, or syncope. We had discussed at last visit she would have her lipids check but she has not had this completed. She is distressed and not fasting today.    FIDELIA (generalized anxiety disorder)  Recurrent major depressive disorder, in partial remission (CMS/[facility])  She did meet with her psychiatry Dr Velasquez earlier today and she is started back on your venlafaxine at 150 mg. She will hopefully be able to get some relief soon from this medication of her anxiety and depression. She is no longer on lamictal or vyvanse. Patient does ask for a small amount of alprazolam and she is encouraged to discuss this with Dr Chambers's as he is her provider and would likely have a medication contract.     She had seen in ED on 2/15/2022 for nausea and vomiting, was diagnosed with Covid 19 infection at her work on 2/14/2022. She indicates she is feeling much improved.         Past Medical History:   Diagnosis Date   • ADD (attention deficit disorder) without hyperactivity 6/10/2015   • ADHD (attention deficit hyperactivity disorder), combined type     dx as youth - on Ritalin   • Anxiety    • Chicken pox    • Depression     post-partum   • Dysmenorrhea    • Eczema     right hand   • Encounter for piercing of tongue    • Insomnia 6/10/2015   • Kidney infection     Bladder and kidney- hospitalized x3   • Kidney stone    • Migraine    • Preeclampsia     • Stomatitis    • Yeast infection        Current Outpatient Medications   Medication Sig   • venlafaxine XR (EFFEXOR XR) 150 MG 24 hr capsule Take 1 capsule by mouth daily.   • amLODIPine (NORVASC) 2.5 MG tablet Take 1 tablet by mouth daily.   • lisinopril (ZESTRIL) 40 MG tablet Take 1 tablet by mouth daily.   • cetirizine (ZyrTEC) 10 MG tablet TAKE 1 TABLET BY MOUTH TWICE DAILY   • Levonorgestrel (MIRENA, 52 MG, IU) NDC: 25141-120-54  EXP:10-  LOT: [id number]  Insertion date: 9-2-2021   • amLODIPine (NORVASC) 5 MG tablet Take 1 tablet by mouth daily.   • Vitamin D, Ergocalciferol, 1.25 mg (50,000 units) capsule TAKE 1 CAPSULE BY MOUTH ONCE A WEEK   • Valacyclovir HCl 1000 MG Tab TAKE 1 TABLET BY MOUTH TWICE DAILY FOR 3 DAYS. TAKE AT FIRST SIGN OF LESION AND MAY ONLY NEED TO TAKE FOR 1 DAY   • triamcinolone (ARISTOCORT) 0.1 % cream Apply to rash area twice a day 1-2 weeks   • EPINEPHrine (EPIPEN 2-DUDLEY) 0.3 MG/0.3ML auto-injector Inject 0.3 mLs into the muscle as needed (USE ONLY AS INSTRUCTED).   • acetaminophen (TYLENOL 8 HOUR ARTHRITIS PAIN) 650 MG CR tablet Take 650 mg by mouth every 8 hours as needed for Pain.   • diphenhydrAMINE (BENADRYL) 25 MG capsule Take 1 capsule by mouth 3 times daily as needed for Itching. (Patient taking differently: Take 50 mg by mouth as needed for Itching. )   • venlafaxine XR (EFFEXOR XR) 75 MG 24 hr capsule Take 1 capsule by mouth nightly.     Current Facility-Administered Medications   Medication   • levonorgestrel (MIRENA) (52 MG) 20 MCG/DAY intrauterine device 52 mg       ALLERGIES:   Allergen Reactions   • Aleve SWELLING   • Lidocaine HIVES and VOMITING   • Morphine PRURITUS   • Tramadol PRURITUS   • Vicodin [Hydrocodone-Acetaminophen] PRURITUS         Tobacco Use: Quit          Packs/Day: .5    Years:            Quit date: 02/09/2020      Vitals:    03/23/22 1254   BP: 136/86   Pulse: (!) 108   Resp: 18   Weight: 96.4 kg (212 lb 8 oz)   Height: 4' 10\" (1.473 m)   LMP:  01/28/2022       Wt Readings from Last 4 Encounters:   03/23/22 96.4 kg (212 lb 8 oz)   02/28/22 90.7 kg (200 lb)   02/15/22 90.8 kg (200 lb 2.8 oz)   02/08/22 93.4 kg (205 lb 12.8 oz)       REVIEW OF SYSTEMS:  Pertinent review of systems above in History of Present Illness.    PHYSICAL EXAM:   Visit Vitals  /86   Pulse (!) 108   Resp 18   Ht 4' 10\" (1.473 m)   Wt 96.4 kg (212 lb 8 oz)   LMP 01/28/2022 (Exact Date)   BMI 44.41 kg/m²       GENERAL APPEARANCE:  Well-developed, well-nourished, alert, cooperative,  well groomed, obese, pleasant female, appears in acute emotional distress.  CARDIAC: S1, S2 heard; rate and rhythm are regular.  No lower extremity  edema. Extremities warm and well perfused.    LUNGS: Even unlabored respirations.  Clear to auscultation without  wheezing, rhonchi or crackles.  Psych: mood and affect appropriate, good eye contact, fluent speech, crying at times.      ASSESSMENT/PLAN:  Essential hypertension  (primary encounter diagnosis)  Adjustment reaction with anxiety and depression  FIDELIA (generalized anxiety disorder)        - LIPID PANEL WITH REFLEX; Future  Follow up in 3 months for repeat lipid lab.     Essential hypertension  Check blood pressures daily and share if not at goal. Continue with  Lisinopril and norvasc as prescribed. Will send refills.    FIDELIA (generalized anxiety disorder)  Recurrent major depressive disorder, in partial remission (CMS/[facility])  Continue with venlafaxine 150 mg and follow up with Dr Chambers's for recommendations on as needed medication.        Orders Placed This Encounter   • amLODIPine (NORVASC) 2.5 MG tablet   • lisinopril (ZESTRIL) 40 MG tablet       Return in about 6 months (around 9/23/2022).  Physical with labs           bedside

## 2023-03-30 NOTE — PROGRESS NOTE ADULT - PROBLEM SELECTOR PLAN 5
- at home has 7U admelog TID and 8U lantus  - will order lantus and ISS for now - at home has 7U admelog TID and 8U lantus  - on 5 units premeal TID, ISS, 8 units of lantus qhs

## 2023-03-30 NOTE — PROGRESS NOTE ADULT - PROBLEM SELECTOR PLAN 1
- pt met sepsis criteria due to elevated WBC, increased WOB (RR), and elevated lactate  - s/p ceftriaxone in the ED  - outpt record showed colonization of sputum w/ Pseudomonas and Klebsiella  - will broaden to azithromycin, hold off zosyn for now, consult ID  - f/u urine legionella  - f/u blood culture and sputum culture, f/u VBG - pt met sepsis criteria due to elevated WBC, increased WOB (RR), and elevated lactate  - s/p ceftriaxone in the ED  - outpt record showed colonization of sputum w/ Pseudomonas and Klebsiella  - on zithromax, pending Pulm recs   - f/u urine legionella  - f/u blood culture and sputum culture,   - VBG 7.33/74/34/39 vbg lactate of 2.2, repeat in the AM.

## 2023-03-30 NOTE — H&P ADULT - PROBLEM SELECTOR PLAN 4
- Na 117 at admission, baseline 135 outpatient  - ddx include Legionaire disease considering respiratory distress vs poor PO intake vs SIADH  - will need urine studies to identify etiology of hypo-Na - Na 117 at admission, baseline 135 outpatient  - ddx include Legionaire disease considering respiratory distress vs poor PO intake vs SIADH  - will need urine studies to identify etiology of hypo-Na  - fluid restriction 1.2L

## 2023-03-30 NOTE — CONSULT NOTE ADULT - SUBJECTIVE AND OBJECTIVE BOX
CHIEF COMPLAINT:Patient is a 68y old  Female who presents with a chief complaint of SOB (30 Mar 2023 11:16)      HPI:  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 and night time AVAPS w/ frequent episodes of admission due to bronchiectasis, HTN, chronic constipation, presented to the ED due to SOB for three days. Son at bedside assisted w/ the HPI.    Pt has been having difficulty clearing mucus and feeling congested starting yesterday AM. She felt difficulty breathing as "her lungs felt squeezed". She became dyspnea w/ 3.5L O2 (Baseline 2.5L) and desatted to 80%. No fevers, no sick contact, and no increase in albuterol use frequency (4-5x/d).    In addition, she reported chronic constipation as she described her abdomen as "tight". Her last BM was 1 wk ago but she has daily small BM pellets as well.    Her PCP prescribed her lasix for HTN for which she started taking only yesterday. Her appetite has been poor and she is eating less and less since last week. Son endorsed a 6lbs wt loss in the last 4 months.    In the ED, VS significant for O2 sat dropped to 80s when admitted, improved on 3L to 100%. WBC 15k, lactate 2.2, pCO2 74, Na 117. Received duoneb, 1L NS, ceftriaxone, solumedrol. (30 Mar 2023 03:33)      PAST MEDICAL & SURGICAL HISTORY:  ABPA (allergic bronchopulmonary aspergillosis)      Bronchiectasis      Hypertension      Vitamin D deficiency      Microcytic anemia      GERD (gastroesophageal reflux disease)      Postnasal drip      Pseudomonas aeruginosa colonization      Allergic rhinitis      Recurrent pneumonia      Type 2 diabetes mellitus, with long-term current use of insulin      Anxiety and depression      Bronchiectasis with acute exacerbation      History of cholecystectomy      S/P dilatation of esophageal stricture          FAMILY HISTORY:  Family history of diabetes mellitus  father    Family history of allergic rhinitis (Child, Child)  son, daughter    Family history of bronchitis  mother, father        SOCIAL HISTORY:  Smoking: [ ] Never Smoked [ ] Former Smoker (__ packs x ___ years) [ ] Current Smoker  (__ packs x ___ years)  Substance Use: [ ] Never Used [ ] Used ____  EtOH Use:  Marital Status: [ ] Single [ ]  [ ]  [ ]   Sexual History:   Occupation:  Recent Travel:  Country of Birth:  Advance Directives:    Allergies    No Known Allergies    Intolerances        HOME MEDICATIONS:  Home Medications:  Admelog SoloStar 100 units/mL injectable solution: 7 unit(s) injectable 3 times a day (before meals) (depending on reading). (30 Mar 2023 03:32)  ALBUTEROL SULFATE (2.5 MG/3ML)0.083% NEBULIZER: USE 1 UNIT DOSE EVERY 4-6 HOURS AS NEEDED FOR WHEEZING . (30 Mar 2023 03:32)  AMLODIPINE BESYLATE 5MG TABLET: TAKE 1 TABLET (5 MG) BY MOUTH DAILY FOR HIGH BLOOD PRESSURE (30 Mar 2023 03:32)  benzonatate 100 mg oral capsule: 1 orally 3 times a day (30 Mar 2023 03:45)  bisacodyl 5 mg oral delayed release tablet: 1 tab(s) orally once daily as needed (30 Mar 2023 03:32)  Calcium 600+D 600 mg-5 mcg (200 intl units) oral tablet: 1 tab(s) orally 2 times a day (30 Mar 2023 03:32)  CYANOCOBALAMIN 1000MCG/ML SOLUTION: ADMINISTER 1 MILLILITER (1,000 MCG) SUBCUTANEOUS EVERY 7 DAYS (30 Mar 2023 03:32)  dextromethorphan-guaifenesin 30 mg-600 mg oral tablet, extended release: 1 orally once a day (30 Mar 2023 03:32)  FERROUS GLUCONATE 324 (38 FE)MG TABLET: TAKE 1 TABLET BY MOUTH 2 TIMES PER DAY BETWEEN MEALS IN WATER OR JUICE (30 Mar 2023 03:32)  Flonase 50 mcg/inh nasal spray: 1 spray(s) in each nostril once a day as needed (30 Mar 2023 03:32)  furosemide 40 mg oral tablet: 1 orally once a day (30 Mar 2023 03:32)  GNP GAS RELIEF 80 MG ORAL TABLET CHEWABLE: TAKE 1 TABLET BY MOUTH 4 TIMES PER DAY AFTER MEALS AND AT BEDTIME AS NEEDED FOR GAS (30 Mar 2023 03:32)  insulin glargine 100 units/mL subcutaneous solution: 8 unit(s) subcutaneous once a day (at bedtime) (30 Mar 2023 03:32)  ketotifen 0.025% ophthalmic solution: 1 drop(s) to each affected eye 2 times a day as needed (30 Mar 2023 03:32)  LIDOCAINE/PRILOCAINE 2.5-2.5% CRM: APPLY RECTALLY TWICE A DAY as needed (30 Mar 2023 03:32)  metoprolol succinate 50 mg oral tablet, extended release: 1 tab(s) orally once a day (30 Mar 2023 03:32)  mirtazapine 7.5 mg oral tablet: 1 orally once a day (30 Mar 2023 03:32)  MONTELUKAST SODIUM 10MG TABLET: TAKE 1 TABLET (10 MG) BY MOUTH DAILY (30 Mar 2023 03:32)  Multiple Vitamins oral tablet: 1 tab(s) orally once a day (30 Mar 2023 03:32)  OMEPRAZOLE-SODIUM BICARBONATE 40-1680MG PACK: 1 PACKET ORALLY DAILY MIXED WITH 5 TO 10 ML OF WATER ON AN EMPTY STOMACH (30 Mar 2023 03:32)  ProAir HFA 90 mcg/inh inhalation aerosol: 2 puff(s) inhaled every 6 hours, As Needed (30 Mar 2023 03:32)  sennosides-docusate 8.6 mg-50 mg oral tablet: 2 tab(s) orally once a day (at bedtime), As Needed (30 Mar 2023 03:32)  sodium chloride 3% inhalation solution: 1 inhaled 3 times a day (30 Mar 2023 03:32)  Spiriva 18 mcg inhalation capsule: 1 cap(s) inhaled once a day (30 Mar 2023 03:32)  Symbicort 160 mcg-4.5 mcg/inh inhalation aerosol: 2 puff(s) inhaled 2 times a day (30 Mar 2023 03:32)      REVIEW OF SYSTEMS:  Constitutional: [ ] negative [ ] fevers [ ] chills [ ] weight loss [ ] weight gain  HEENT: [ ] negative [ ] dry eyes [ ] eye irritation [ ] postnasal drip [ ] nasal congestion  CV: [ ] negative  [ ] chest pain [ ] orthopnea [ ] palpitations [ ] murmur  Resp: [ ] negative [ ] cough [ ] shortness of breath [ ] dyspnea [ ] wheezing [ ] sputum [ ] hemoptysis  GI: [ ] negative [ ] nausea [ ] vomiting [ ] diarrhea [ ] constipation [ ] abd pain [ ] dysphagia   : [ ] negative [ ] dysuria [ ] nocturia [ ] hematuria [ ] increased urinary frequency  Musculoskeletal: [ ] negative [ ] back pain [ ] myalgias [ ] arthralgias [ ] fracture  Skin: [ ] negative [ ] rash [ ] itch  Neurological: [ ] negative [ ] headache [ ] dizziness [ ] syncope [ ] weakness [ ] numbness  Psychiatric: [ ] negative [ ] anxiety [ ] depression  Endocrine: [ ] negative [ ] diabetes [ ] thyroid problem  Hematologic/Lymphatic: [ ] negative [ ] anemia [ ] bleeding problem  Allergic/Immunologic: [ ] negative [ ] itchy eyes [ ] nasal discharge [ ] hives [ ] angioedema  [ ] All other systems negative  [ ] Unable to assess ROS because ________    OBJECTIVE:  ICU Vital Signs Last 24 Hrs  T(C): 36.6 (30 Mar 2023 07:15), Max: 36.9 (30 Mar 2023 00:57)  T(F): 97.9 (30 Mar 2023 07:15), Max: 98.4 (30 Mar 2023 00:57)  HR: 93 (30 Mar 2023 15:49) (87 - 93)  BP: 160/74 (30 Mar 2023 07:15) (148/62 - 160/74)  BP(mean): --  ABP: --  ABP(mean): --  RR: 17 (30 Mar 2023 07:15) (17 - 17)  SpO2: 96% (30 Mar 2023 15:49) (96% - 100%)    O2 Parameters below as of 30 Mar 2023 07:15  Patient On (Oxygen Delivery Method): nasal cannula  O2 Flow (L/min): 3            CAPILLARY BLOOD GLUCOSE      POCT Blood Glucose.: 297 mg/dL (30 Mar 2023 12:27)      PHYSICAL EXAM:  GENERAL: NAD, well-groomed, well-developed  HEAD:  Atraumatic, Normocephalic  EYES: EOMI, PERRLA, conjunctiva and sclera clear  ENMT: No tonsillar erythema, exudates, or enlargement; Moist mucous membranes, Good dentition, No lesions  NECK: Supple, No JVD, Normal thyroid  CHEST/LUNG: Clear to auscultation bilaterally; No rales, rhonchi, wheezing, or rubs  HEART: Regular rate and rhythm; No murmurs, rubs, or gallops  ABDOMEN: Soft, Nontender, Nondistended; Bowel sounds present  VASCULAR:  2+ Peripheral Pulses, No clubbing, cyanosis, or edema  LYMPH: No lymphadenopathy noted  SKIN: No rashes or lesions  NERVOUS SYSTEM:  Alert & Oriented X3, Good concentration; Motor Strength 5/5 B/L upper and lower extremities; DTRs 2+ intact and symmetric    HOSPITAL MEDICATIONS:  Standing Meds:  acetylcysteine 10%  Inhalation 4 milliLiter(s) Inhalation two times a day  albuterol/ipratropium for Nebulization 3 milliLiter(s) Nebulizer every 6 hours  amLODIPine   Tablet 5 milliGRAM(s) Oral daily  azithromycin  IVPB      budesonide 160 MICROgram(s)/formoterol 4.5 MICROgram(s) Inhaler 2 Puff(s) Inhalation two times a day  chlorhexidine 2% Cloths 1 Application(s) Topical daily  dextrose 5%. 1000 milliLiter(s) IV Continuous <Continuous>  dextrose 5%. 1000 milliLiter(s) IV Continuous <Continuous>  dextrose 50% Injectable 25 Gram(s) IV Push once  dextrose 50% Injectable 12.5 Gram(s) IV Push once  dextrose 50% Injectable 25 Gram(s) IV Push once  dextrose Oral Gel 15 Gram(s) Oral once  enoxaparin Injectable 30 milliGRAM(s) SubCutaneous every 24 hours  fluticasone propionate 50 MICROgram(s)/spray Nasal Spray 1 Spray(s) Both Nostrils two times a day  glucagon  Injectable 1 milliGRAM(s) IntraMuscular once  influenza  Vaccine (HIGH DOSE) 0.7 milliLiter(s) IntraMuscular once  insulin glargine Injectable (LANTUS) 4 Unit(s) SubCutaneous once  insulin glargine Injectable (LANTUS) 8 Unit(s) SubCutaneous at bedtime  insulin lispro (ADMELOG) corrective regimen sliding scale   SubCutaneous three times a day before meals  insulin lispro (ADMELOG) corrective regimen sliding scale   SubCutaneous at bedtime  insulin lispro Injectable (ADMELOG) 5 Unit(s) SubCutaneous three times a day before meals  ketotifen 0.025% Ophthalmic Solution 1 Drop(s) Both EYES two times a day  mirtazapine 7.5 milliGRAM(s) Oral at bedtime  montelukast 10 milliGRAM(s) Oral daily  nystatin    Suspension 779924 Unit(s) Oral four times a day  pantoprazole    Tablet 40 milliGRAM(s) Oral before breakfast  polyethylene glycol 3350 17 Gram(s) Oral two times a day  senna 2 Tablet(s) Oral at bedtime  sodium chloride 3%  Inhalation 4 milliLiter(s) Inhalation every 12 hours  tiotropium 2.5 MICROgram(s) Inhaler 2 Puff(s) Inhalation daily      PRN Meds:  acetaminophen     Tablet .. 650 milliGRAM(s) Oral every 6 hours PRN  aluminum hydroxide/magnesium hydroxide/simethicone Suspension 30 milliLiter(s) Oral every 4 hours PRN  bisacodyl Suppository 10 milliGRAM(s) Rectal daily PRN  guaifenesin/dextromethorphan Oral Liquid 10 milliLiter(s) Oral every 8 hours PRN  melatonin 3 milliGRAM(s) Oral at bedtime PRN  ondansetron Injectable 4 milliGRAM(s) IV Push every 8 hours PRN      LABS:    The Labs were reviewed by me   The Radiology was reviewed by me    EKG tracing reviewed by me    03-30    123<L>  |  81<L>  |  14  ----------------------------<  366<H>  4.4   |  33<H>  |  0.38<L>  03-29    117<LL>  |  75<L>  |  15  ----------------------------<  336<H>  4.6   |  32<H>  |  0.41<L>    Ca    9.1      30 Mar 2023 09:45  Ca    9.2      29 Mar 2023 15:20  Phos  2.5     03-30  Mg     2.20     03-30    TPro  6.5  /  Alb  3.4  /  TBili  0.3  /  DBili  x   /  AST  42<H>  /  ALT  65<H>  /  AlkPhos  218<H>  03-30  TPro  7.4  /  Alb  4.0  /  TBili  0.4  /  DBili  x   /  AST  44<H>  /  ALT  64<H>  /  AlkPhos  259<H>  03-29    Magnesium, Serum: 2.20 mg/dL (03-30-23 @ 09:45)  Magnesium, Serum: 1.90 mg/dL (03-29-23 @ 15:20)    Phosphorus Level, Serum: 2.5 mg/dL (03-30-23 @ 09:45)      PT/INR - ( 29 Mar 2023 15:20 )   PT: 11.5 sec;   INR: 0.99 ratio         PTT - ( 29 Mar 2023 15:20 )  PTT:25.7 sec                                        12.6   15.28 )-----------( 192      ( 30 Mar 2023 09:45 )             39.3                         14.0   15.24 )-----------( 179      ( 29 Mar 2023 15:20 )             43.2     CAPILLARY BLOOD GLUCOSE      POCT Blood Glucose.: 297 mg/dL (30 Mar 2023 12:27)  POCT Blood Glucose.: 323 mg/dL (30 Mar 2023 08:38)  POCT Blood Glucose.: 313 mg/dL (30 Mar 2023 06:46)  POCT Blood Glucose.: 317 mg/dL (30 Mar 2023 04:57)  POCT Blood Glucose.: 321 mg/dL (29 Mar 2023 17:25)        MICROBIOLOGY:       RADIOLOGY:  [ ] Reviewed and interpreted by me    PULMONARY FUNCTION TESTS:    EKG:   CHIEF COMPLAINT:Patient is a 68y old  Female who presents with a chief complaint of SOB (30 Mar 2023 11:16)      HPI:  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 and night time AVAPS w/ frequent episodes of admission due to bronchiectasis, HTN, chronic constipation, presented to the ED due to SOB for three days. Son at bedside assisted w/ the HPI.    Pt has been having difficulty clearing mucus and feeling congested starting yesterday AM. She felt difficulty breathing as "her lungs felt squeezed". She became dyspnea w/ 3.5L O2 (Baseline 2.5L) and desatted to 80%. No fevers, no sick contact, and no increase in albuterol use frequency (4-5x/d).    In addition, she reported chronic constipation as she described her abdomen as "tight". Her last BM was 1 wk ago but she has daily small BM pellets as well.    Her PCP prescribed her lasix for HTN for which she started taking only yesterday. Her appetite has been poor and she is eating less and less since last week. Son endorsed a 6lbs wt loss in the last 4 months.    In the ED, VS significant for O2 sat dropped to 80s when admitted, improved on 3L to 100%. WBC 15k, lactate 2.2, pCO2 74, Na 117. Received duoneb, 1L NS, ceftriaxone, solumedrol. (30 Mar 2023 03:33)    Has grown pseudomonas in past 3 cultures.    PAST MEDICAL & SURGICAL HISTORY:  ABPA (allergic bronchopulmonary aspergillosis)      Bronchiectasis      Hypertension      Vitamin D deficiency      Microcytic anemia      GERD (gastroesophageal reflux disease)      Postnasal drip      Pseudomonas aeruginosa colonization      Allergic rhinitis      Recurrent pneumonia      Type 2 diabetes mellitus, with long-term current use of insulin      Anxiety and depression      Bronchiectasis with acute exacerbation      History of cholecystectomy      S/P dilatation of esophageal stricture          FAMILY HISTORY:  Family history of diabetes mellitus  father    Family history of allergic rhinitis (Child, Child)  son, daughter    Family history of bronchitis  mother, father        SOCIAL HISTORY:  Smoking: [ ] Never Smoked [ ] Former Smoker (__ packs x ___ years) [ ] Current Smoker  (__ packs x ___ years)  Substance Use: [ ] Never Used [ ] Used ____  EtOH Use:  Marital Status: [ ] Single [ ]  [ ]  [ ]   Sexual History:   Occupation:  Recent Travel:  Country of Birth:  Advance Directives:    Allergies    No Known Allergies    Intolerances        HOME MEDICATIONS:  Home Medications:  Admelog SoloStar 100 units/mL injectable solution: 7 unit(s) injectable 3 times a day (before meals) (depending on reading). (30 Mar 2023 03:32)  ALBUTEROL SULFATE (2.5 MG/3ML)0.083% NEBULIZER: USE 1 UNIT DOSE EVERY 4-6 HOURS AS NEEDED FOR WHEEZING . (30 Mar 2023 03:32)  AMLODIPINE BESYLATE 5MG TABLET: TAKE 1 TABLET (5 MG) BY MOUTH DAILY FOR HIGH BLOOD PRESSURE (30 Mar 2023 03:32)  benzonatate 100 mg oral capsule: 1 orally 3 times a day (30 Mar 2023 03:45)  bisacodyl 5 mg oral delayed release tablet: 1 tab(s) orally once daily as needed (30 Mar 2023 03:32)  Calcium 600+D 600 mg-5 mcg (200 intl units) oral tablet: 1 tab(s) orally 2 times a day (30 Mar 2023 03:32)  CYANOCOBALAMIN 1000MCG/ML SOLUTION: ADMINISTER 1 MILLILITER (1,000 MCG) SUBCUTANEOUS EVERY 7 DAYS (30 Mar 2023 03:32)  dextromethorphan-guaifenesin 30 mg-600 mg oral tablet, extended release: 1 orally once a day (30 Mar 2023 03:32)  FERROUS GLUCONATE 324 (38 FE)MG TABLET: TAKE 1 TABLET BY MOUTH 2 TIMES PER DAY BETWEEN MEALS IN WATER OR JUICE (30 Mar 2023 03:32)  Flonase 50 mcg/inh nasal spray: 1 spray(s) in each nostril once a day as needed (30 Mar 2023 03:32)  furosemide 40 mg oral tablet: 1 orally once a day (30 Mar 2023 03:32)  GNP GAS RELIEF 80 MG ORAL TABLET CHEWABLE: TAKE 1 TABLET BY MOUTH 4 TIMES PER DAY AFTER MEALS AND AT BEDTIME AS NEEDED FOR GAS (30 Mar 2023 03:32)  insulin glargine 100 units/mL subcutaneous solution: 8 unit(s) subcutaneous once a day (at bedtime) (30 Mar 2023 03:32)  ketotifen 0.025% ophthalmic solution: 1 drop(s) to each affected eye 2 times a day as needed (30 Mar 2023 03:32)  LIDOCAINE/PRILOCAINE 2.5-2.5% CRM: APPLY RECTALLY TWICE A DAY as needed (30 Mar 2023 03:32)  metoprolol succinate 50 mg oral tablet, extended release: 1 tab(s) orally once a day (30 Mar 2023 03:32)  mirtazapine 7.5 mg oral tablet: 1 orally once a day (30 Mar 2023 03:32)  MONTELUKAST SODIUM 10MG TABLET: TAKE 1 TABLET (10 MG) BY MOUTH DAILY (30 Mar 2023 03:32)  Multiple Vitamins oral tablet: 1 tab(s) orally once a day (30 Mar 2023 03:32)  OMEPRAZOLE-SODIUM BICARBONATE 40-1680MG PACK: 1 PACKET ORALLY DAILY MIXED WITH 5 TO 10 ML OF WATER ON AN EMPTY STOMACH (30 Mar 2023 03:32)  ProAir HFA 90 mcg/inh inhalation aerosol: 2 puff(s) inhaled every 6 hours, As Needed (30 Mar 2023 03:32)  sennosides-docusate 8.6 mg-50 mg oral tablet: 2 tab(s) orally once a day (at bedtime), As Needed (30 Mar 2023 03:32)  sodium chloride 3% inhalation solution: 1 inhaled 3 times a day (30 Mar 2023 03:32)  Spiriva 18 mcg inhalation capsule: 1 cap(s) inhaled once a day (30 Mar 2023 03:32)  Symbicort 160 mcg-4.5 mcg/inh inhalation aerosol: 2 puff(s) inhaled 2 times a day (30 Mar 2023 03:32)      REVIEW OF SYSTEMS:  Constitutional: [x ] negative [ ] fevers [ ] chills [ ] weight loss [ ] weight gain  HEENT: [ x] negative [ ] dry eyes [ ] eye irritation [ ] postnasal drip [ ] nasal congestion  CV: [x ] negative  [ ] chest pain [ ] orthopnea [ ] palpitations [ ] murmur  Resp: [ ] negative [x ] cough [ x] shortness of breath [ ] dyspnea [x ] wheezing [ ] sputum [ ] hemoptysis  GI: [ ] negative [ ] nausea [ ] vomiting [ ] diarrhea [x ] constipation [ ] abd pain [ ] dysphagia   : [ ] negative [ ] dysuria [ ] nocturia [ ] hematuria [ ] increased urinary frequency  Musculoskeletal: [ ] negative [ ] back pain [ ] myalgias [ ] arthralgias [ ] fracture  Skin: [ ] negative [ ] rash [ ] itch  Neurological: [ ] negative [ ] headache [ ] dizziness [ ] syncope [ ] weakness [ ] numbness  Psychiatric: [ ] negative [ ] anxiety [ ] depression  Endocrine: [ ] negative [ ] diabetes [ ] thyroid problem  Hematologic/Lymphatic: [ ] negative [ ] anemia [ ] bleeding problem  Allergic/Immunologic: [ ] negative [ ] itchy eyes [ ] nasal discharge [ ] hives [ ] angioedema  [x ] All other systems negative  [ ] Unable to assess ROS because ________    OBJECTIVE:  ICU Vital Signs Last 24 Hrs  T(C): 36.6 (30 Mar 2023 07:15), Max: 36.9 (30 Mar 2023 00:57)  T(F): 97.9 (30 Mar 2023 07:15), Max: 98.4 (30 Mar 2023 00:57)  HR: 93 (30 Mar 2023 15:49) (87 - 93)  BP: 160/74 (30 Mar 2023 07:15) (148/62 - 160/74)  BP(mean): --  ABP: --  ABP(mean): --  RR: 17 (30 Mar 2023 07:15) (17 - 17)  SpO2: 96% (30 Mar 2023 15:49) (96% - 100%)    O2 Parameters below as of 30 Mar 2023 07:15  Patient On (Oxygen Delivery Method): nasal cannula  O2 Flow (L/min): 3            CAPILLARY BLOOD GLUCOSE      POCT Blood Glucose.: 297 mg/dL (30 Mar 2023 12:27)      PHYSICAL EXAM:  Gen: NAD  HEENT: MMM, PERRL, EOMI  Neck: No JVP elevation  CV: RRR, no m/r/g  Lung: diffuse rhonchi and wheezing  Abd: Soft, NT, ND  Ext: WWP, no CCE  Skin: No rash  Neuro: A&Ox3, no focal deficits    HOSPITAL MEDICATIONS:  Standing Meds:  acetylcysteine 10%  Inhalation 4 milliLiter(s) Inhalation two times a day  albuterol/ipratropium for Nebulization 3 milliLiter(s) Nebulizer every 6 hours  amLODIPine   Tablet 5 milliGRAM(s) Oral daily  azithromycin  IVPB      budesonide 160 MICROgram(s)/formoterol 4.5 MICROgram(s) Inhaler 2 Puff(s) Inhalation two times a day  chlorhexidine 2% Cloths 1 Application(s) Topical daily  dextrose 5%. 1000 milliLiter(s) IV Continuous <Continuous>  dextrose 5%. 1000 milliLiter(s) IV Continuous <Continuous>  dextrose 50% Injectable 25 Gram(s) IV Push once  dextrose 50% Injectable 12.5 Gram(s) IV Push once  dextrose 50% Injectable 25 Gram(s) IV Push once  dextrose Oral Gel 15 Gram(s) Oral once  enoxaparin Injectable 30 milliGRAM(s) SubCutaneous every 24 hours  fluticasone propionate 50 MICROgram(s)/spray Nasal Spray 1 Spray(s) Both Nostrils two times a day  glucagon  Injectable 1 milliGRAM(s) IntraMuscular once  influenza  Vaccine (HIGH DOSE) 0.7 milliLiter(s) IntraMuscular once  insulin glargine Injectable (LANTUS) 4 Unit(s) SubCutaneous once  insulin glargine Injectable (LANTUS) 8 Unit(s) SubCutaneous at bedtime  insulin lispro (ADMELOG) corrective regimen sliding scale   SubCutaneous three times a day before meals  insulin lispro (ADMELOG) corrective regimen sliding scale   SubCutaneous at bedtime  insulin lispro Injectable (ADMELOG) 5 Unit(s) SubCutaneous three times a day before meals  ketotifen 0.025% Ophthalmic Solution 1 Drop(s) Both EYES two times a day  mirtazapine 7.5 milliGRAM(s) Oral at bedtime  montelukast 10 milliGRAM(s) Oral daily  nystatin    Suspension 228062 Unit(s) Oral four times a day  pantoprazole    Tablet 40 milliGRAM(s) Oral before breakfast  polyethylene glycol 3350 17 Gram(s) Oral two times a day  senna 2 Tablet(s) Oral at bedtime  sodium chloride 3%  Inhalation 4 milliLiter(s) Inhalation every 12 hours  tiotropium 2.5 MICROgram(s) Inhaler 2 Puff(s) Inhalation daily      PRN Meds:  acetaminophen     Tablet .. 650 milliGRAM(s) Oral every 6 hours PRN  aluminum hydroxide/magnesium hydroxide/simethicone Suspension 30 milliLiter(s) Oral every 4 hours PRN  bisacodyl Suppository 10 milliGRAM(s) Rectal daily PRN  guaifenesin/dextromethorphan Oral Liquid 10 milliLiter(s) Oral every 8 hours PRN  melatonin 3 milliGRAM(s) Oral at bedtime PRN  ondansetron Injectable 4 milliGRAM(s) IV Push every 8 hours PRN      LABS:    The Labs were reviewed by me   The Radiology was reviewed by me    EKG tracing reviewed by me    03-30    123<L>  |  81<L>  |  14  ----------------------------<  366<H>  4.4   |  33<H>  |  0.38<L>  03-29    117<LL>  |  75<L>  |  15  ----------------------------<  336<H>  4.6   |  32<H>  |  0.41<L>    Ca    9.1      30 Mar 2023 09:45  Ca    9.2      29 Mar 2023 15:20  Phos  2.5     03-30  Mg     2.20     03-30    TPro  6.5  /  Alb  3.4  /  TBili  0.3  /  DBili  x   /  AST  42<H>  /  ALT  65<H>  /  AlkPhos  218<H>  03-30  TPro  7.4  /  Alb  4.0  /  TBili  0.4  /  DBili  x   /  AST  44<H>  /  ALT  64<H>  /  AlkPhos  259<H>  03-29    Magnesium, Serum: 2.20 mg/dL (03-30-23 @ 09:45)  Magnesium, Serum: 1.90 mg/dL (03-29-23 @ 15:20)    Phosphorus Level, Serum: 2.5 mg/dL (03-30-23 @ 09:45)      PT/INR - ( 29 Mar 2023 15:20 )   PT: 11.5 sec;   INR: 0.99 ratio         PTT - ( 29 Mar 2023 15:20 )  PTT:25.7 sec                                        12.6   15.28 )-----------( 192      ( 30 Mar 2023 09:45 )             39.3                         14.0   15.24 )-----------( 179      ( 29 Mar 2023 15:20 )             43.2     CAPILLARY BLOOD GLUCOSE      POCT Blood Glucose.: 297 mg/dL (30 Mar 2023 12:27)  POCT Blood Glucose.: 323 mg/dL (30 Mar 2023 08:38)  POCT Blood Glucose.: 313 mg/dL (30 Mar 2023 06:46)  POCT Blood Glucose.: 317 mg/dL (30 Mar 2023 04:57)  POCT Blood Glucose.: 321 mg/dL (29 Mar 2023 17:25)        MICROBIOLOGY:       RADIOLOGY:  [ ] Reviewed and interpreted by me    PULMONARY FUNCTION TESTS:    EKG:   CHIEF COMPLAINT: Patient is a 68y old  Female who presents with a chief complaint of SOB (30 Mar 2023 11:16)      HPI:  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 and night time AVAPS w/ frequent episodes of admission due to bronchiectasis, HTN, chronic constipation, presented to the ED due to SOB for three days. Son at bedside assisted w/ the HPI.    Pt has been having difficulty clearing mucus and feeling congested starting yesterday AM. She felt difficulty breathing as "her lungs felt squeezed". She became dyspnea w/ 3.5L O2 (Baseline 2.5L) and desatted to 80%. No fevers, no sick contact, and no increase in albuterol use frequency (4-5x/d).    In addition, she reported chronic constipation as she described her abdomen as "tight". Her last BM was 1 wk ago but she has daily small BM pellets as well.    Her PCP prescribed her lasix for HTN for which she started taking only yesterday. Her appetite has been poor and she is eating less and less since last week. Son endorsed a 6lbs wt loss in the last 4 months.    In the ED, VS significant for O2 sat dropped to 80s when admitted, improved on 3L to 100%. WBC 15k, lactate 2.2, pCO2 74, Na 117. Received duoneb, 1L NS, ceftriaxone, solumedrol. (30 Mar 2023 03:33)    Has grown pseudomonas in past 3 cultures.    PAST MEDICAL & SURGICAL HISTORY:  ABPA (allergic bronchopulmonary aspergillosis)      Bronchiectasis      Hypertension      Vitamin D deficiency      Microcytic anemia      GERD (gastroesophageal reflux disease)      Postnasal drip      Pseudomonas aeruginosa colonization      Allergic rhinitis      Recurrent pneumonia      Type 2 diabetes mellitus, with long-term current use of insulin      Anxiety and depression      Bronchiectasis with acute exacerbation      History of cholecystectomy      S/P dilatation of esophageal stricture          FAMILY HISTORY:  Family history of diabetes mellitus  father    Family history of allergic rhinitis (Child, Child)  son, daughter    Family history of bronchitis  mother, father        SOCIAL HISTORY:  Smoking: [ ] Never Smoked [ ] Former Smoker (__ packs x ___ years) [ ] Current Smoker  (__ packs x ___ years)  Substance Use: [ ] Never Used [ ] Used ____  EtOH Use:  Marital Status: [ ] Single [ ]  [ ]  [ ]   Sexual History:   Occupation:  Recent Travel:  Country of Birth:  Advance Directives:    Allergies    No Known Allergies    Intolerances        HOME MEDICATIONS:  Home Medications:  Admelog SoloStar 100 units/mL injectable solution: 7 unit(s) injectable 3 times a day (before meals) (depending on reading). (30 Mar 2023 03:32)  ALBUTEROL SULFATE (2.5 MG/3ML)0.083% NEBULIZER: USE 1 UNIT DOSE EVERY 4-6 HOURS AS NEEDED FOR WHEEZING . (30 Mar 2023 03:32)  AMLODIPINE BESYLATE 5MG TABLET: TAKE 1 TABLET (5 MG) BY MOUTH DAILY FOR HIGH BLOOD PRESSURE (30 Mar 2023 03:32)  benzonatate 100 mg oral capsule: 1 orally 3 times a day (30 Mar 2023 03:45)  bisacodyl 5 mg oral delayed release tablet: 1 tab(s) orally once daily as needed (30 Mar 2023 03:32)  Calcium 600+D 600 mg-5 mcg (200 intl units) oral tablet: 1 tab(s) orally 2 times a day (30 Mar 2023 03:32)  CYANOCOBALAMIN 1000MCG/ML SOLUTION: ADMINISTER 1 MILLILITER (1,000 MCG) SUBCUTANEOUS EVERY 7 DAYS (30 Mar 2023 03:32)  dextromethorphan-guaifenesin 30 mg-600 mg oral tablet, extended release: 1 orally once a day (30 Mar 2023 03:32)  FERROUS GLUCONATE 324 (38 FE)MG TABLET: TAKE 1 TABLET BY MOUTH 2 TIMES PER DAY BETWEEN MEALS IN WATER OR JUICE (30 Mar 2023 03:32)  Flonase 50 mcg/inh nasal spray: 1 spray(s) in each nostril once a day as needed (30 Mar 2023 03:32)  furosemide 40 mg oral tablet: 1 orally once a day (30 Mar 2023 03:32)  GNP GAS RELIEF 80 MG ORAL TABLET CHEWABLE: TAKE 1 TABLET BY MOUTH 4 TIMES PER DAY AFTER MEALS AND AT BEDTIME AS NEEDED FOR GAS (30 Mar 2023 03:32)  insulin glargine 100 units/mL subcutaneous solution: 8 unit(s) subcutaneous once a day (at bedtime) (30 Mar 2023 03:32)  ketotifen 0.025% ophthalmic solution: 1 drop(s) to each affected eye 2 times a day as needed (30 Mar 2023 03:32)  LIDOCAINE/PRILOCAINE 2.5-2.5% CRM: APPLY RECTALLY TWICE A DAY as needed (30 Mar 2023 03:32)  metoprolol succinate 50 mg oral tablet, extended release: 1 tab(s) orally once a day (30 Mar 2023 03:32)  mirtazapine 7.5 mg oral tablet: 1 orally once a day (30 Mar 2023 03:32)  MONTELUKAST SODIUM 10MG TABLET: TAKE 1 TABLET (10 MG) BY MOUTH DAILY (30 Mar 2023 03:32)  Multiple Vitamins oral tablet: 1 tab(s) orally once a day (30 Mar 2023 03:32)  OMEPRAZOLE-SODIUM BICARBONATE 40-1680MG PACK: 1 PACKET ORALLY DAILY MIXED WITH 5 TO 10 ML OF WATER ON AN EMPTY STOMACH (30 Mar 2023 03:32)  ProAir HFA 90 mcg/inh inhalation aerosol: 2 puff(s) inhaled every 6 hours, As Needed (30 Mar 2023 03:32)  sennosides-docusate 8.6 mg-50 mg oral tablet: 2 tab(s) orally once a day (at bedtime), As Needed (30 Mar 2023 03:32)  sodium chloride 3% inhalation solution: 1 inhaled 3 times a day (30 Mar 2023 03:32)  Spiriva 18 mcg inhalation capsule: 1 cap(s) inhaled once a day (30 Mar 2023 03:32)  Symbicort 160 mcg-4.5 mcg/inh inhalation aerosol: 2 puff(s) inhaled 2 times a day (30 Mar 2023 03:32)      REVIEW OF SYSTEMS:  Constitutional: [x ] negative [ ] fevers [ ] chills [ ] weight loss [ ] weight gain  HEENT: [ x] negative [ ] dry eyes [ ] eye irritation [ ] postnasal drip [ ] nasal congestion  CV: [x ] negative  [ ] chest pain [ ] orthopnea [ ] palpitations [ ] murmur  Resp: [ ] negative [x ] cough [ x] shortness of breath [ ] dyspnea [x ] wheezing [ ] sputum [ ] hemoptysis  GI: [ ] negative [ ] nausea [ ] vomiting [ ] diarrhea [x ] constipation [ ] abd pain [ ] dysphagia   : [ ] negative [ ] dysuria [ ] nocturia [ ] hematuria [ ] increased urinary frequency  Musculoskeletal: [ ] negative [ ] back pain [ ] myalgias [ ] arthralgias [ ] fracture  Skin: [ ] negative [ ] rash [ ] itch  Neurological: [ ] negative [ ] headache [ ] dizziness [ ] syncope [ ] weakness [ ] numbness  Psychiatric: [ ] negative [ ] anxiety [ ] depression  Endocrine: [ ] negative [ ] diabetes [ ] thyroid problem  Hematologic/Lymphatic: [ ] negative [ ] anemia [ ] bleeding problem  Allergic/Immunologic: [ ] negative [ ] itchy eyes [ ] nasal discharge [ ] hives [ ] angioedema  [x ] All other systems negative  [ ] Unable to assess ROS because ________    OBJECTIVE:  ICU Vital Signs Last 24 Hrs  T(C): 36.6 (30 Mar 2023 07:15), Max: 36.9 (30 Mar 2023 00:57)  T(F): 97.9 (30 Mar 2023 07:15), Max: 98.4 (30 Mar 2023 00:57)  HR: 93 (30 Mar 2023 15:49) (87 - 93)  BP: 160/74 (30 Mar 2023 07:15) (148/62 - 160/74)  BP(mean): --  ABP: --  ABP(mean): --  RR: 17 (30 Mar 2023 07:15) (17 - 17)  SpO2: 96% (30 Mar 2023 15:49) (96% - 100%)    O2 Parameters below as of 30 Mar 2023 07:15  Patient On (Oxygen Delivery Method): nasal cannula  O2 Flow (L/min): 3            CAPILLARY BLOOD GLUCOSE      POCT Blood Glucose.: 297 mg/dL (30 Mar 2023 12:27)      PHYSICAL EXAM:  Gen: NAD  HEENT: MMM, PERRL, EOMI  Neck: No JVP elevation  CV: RRR, no m/r/g  Lung: diffuse rhonchi and wheezing  Abd: Soft, NT, ND  Ext: WWP, no CCE  Skin: No rash  Neuro: A&Ox3, no focal deficits    HOSPITAL MEDICATIONS:  Standing Meds:  acetylcysteine 10%  Inhalation 4 milliLiter(s) Inhalation two times a day  albuterol/ipratropium for Nebulization 3 milliLiter(s) Nebulizer every 6 hours  amLODIPine   Tablet 5 milliGRAM(s) Oral daily  azithromycin  IVPB      budesonide 160 MICROgram(s)/formoterol 4.5 MICROgram(s) Inhaler 2 Puff(s) Inhalation two times a day  chlorhexidine 2% Cloths 1 Application(s) Topical daily  dextrose 5%. 1000 milliLiter(s) IV Continuous <Continuous>  dextrose 5%. 1000 milliLiter(s) IV Continuous <Continuous>  dextrose 50% Injectable 25 Gram(s) IV Push once  dextrose 50% Injectable 12.5 Gram(s) IV Push once  dextrose 50% Injectable 25 Gram(s) IV Push once  dextrose Oral Gel 15 Gram(s) Oral once  enoxaparin Injectable 30 milliGRAM(s) SubCutaneous every 24 hours  fluticasone propionate 50 MICROgram(s)/spray Nasal Spray 1 Spray(s) Both Nostrils two times a day  glucagon  Injectable 1 milliGRAM(s) IntraMuscular once  influenza  Vaccine (HIGH DOSE) 0.7 milliLiter(s) IntraMuscular once  insulin glargine Injectable (LANTUS) 4 Unit(s) SubCutaneous once  insulin glargine Injectable (LANTUS) 8 Unit(s) SubCutaneous at bedtime  insulin lispro (ADMELOG) corrective regimen sliding scale   SubCutaneous three times a day before meals  insulin lispro (ADMELOG) corrective regimen sliding scale   SubCutaneous at bedtime  insulin lispro Injectable (ADMELOG) 5 Unit(s) SubCutaneous three times a day before meals  ketotifen 0.025% Ophthalmic Solution 1 Drop(s) Both EYES two times a day  mirtazapine 7.5 milliGRAM(s) Oral at bedtime  montelukast 10 milliGRAM(s) Oral daily  nystatin    Suspension 491082 Unit(s) Oral four times a day  pantoprazole    Tablet 40 milliGRAM(s) Oral before breakfast  polyethylene glycol 3350 17 Gram(s) Oral two times a day  senna 2 Tablet(s) Oral at bedtime  sodium chloride 3%  Inhalation 4 milliLiter(s) Inhalation every 12 hours  tiotropium 2.5 MICROgram(s) Inhaler 2 Puff(s) Inhalation daily      PRN Meds:  acetaminophen     Tablet .. 650 milliGRAM(s) Oral every 6 hours PRN  aluminum hydroxide/magnesium hydroxide/simethicone Suspension 30 milliLiter(s) Oral every 4 hours PRN  bisacodyl Suppository 10 milliGRAM(s) Rectal daily PRN  guaifenesin/dextromethorphan Oral Liquid 10 milliLiter(s) Oral every 8 hours PRN  melatonin 3 milliGRAM(s) Oral at bedtime PRN  ondansetron Injectable 4 milliGRAM(s) IV Push every 8 hours PRN      LABS:    The Labs were reviewed by me   The Radiology was reviewed by me    EKG tracing reviewed by me    03-30    123<L>  |  81<L>  |  14  ----------------------------<  366<H>  4.4   |  33<H>  |  0.38<L>  03-29    117<LL>  |  75<L>  |  15  ----------------------------<  336<H>  4.6   |  32<H>  |  0.41<L>    Ca    9.1      30 Mar 2023 09:45  Ca    9.2      29 Mar 2023 15:20  Phos  2.5     03-30  Mg     2.20     03-30    TPro  6.5  /  Alb  3.4  /  TBili  0.3  /  DBili  x   /  AST  42<H>  /  ALT  65<H>  /  AlkPhos  218<H>  03-30  TPro  7.4  /  Alb  4.0  /  TBili  0.4  /  DBili  x   /  AST  44<H>  /  ALT  64<H>  /  AlkPhos  259<H>  03-29    Magnesium, Serum: 2.20 mg/dL (03-30-23 @ 09:45)  Magnesium, Serum: 1.90 mg/dL (03-29-23 @ 15:20)    Phosphorus Level, Serum: 2.5 mg/dL (03-30-23 @ 09:45)      PT/INR - ( 29 Mar 2023 15:20 )   PT: 11.5 sec;   INR: 0.99 ratio         PTT - ( 29 Mar 2023 15:20 )  PTT:25.7 sec                                        12.6   15.28 )-----------( 192      ( 30 Mar 2023 09:45 )             39.3                         14.0   15.24 )-----------( 179      ( 29 Mar 2023 15:20 )             43.2     CAPILLARY BLOOD GLUCOSE      POCT Blood Glucose.: 297 mg/dL (30 Mar 2023 12:27)  POCT Blood Glucose.: 323 mg/dL (30 Mar 2023 08:38)  POCT Blood Glucose.: 313 mg/dL (30 Mar 2023 06:46)  POCT Blood Glucose.: 317 mg/dL (30 Mar 2023 04:57)  POCT Blood Glucose.: 321 mg/dL (29 Mar 2023 17:25)        MICROBIOLOGY:       RADIOLOGY:  [ ] Reviewed and interpreted by me    PULMONARY FUNCTION TESTS:    EKG:

## 2023-03-30 NOTE — H&P ADULT - ASSESSMENT
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

## 2023-03-30 NOTE — PATIENT PROFILE ADULT - FALL HARM RISK - HARM RISK INTERVENTIONS
Assistance with ambulation/Assistance OOB with selected safe patient handling equipment/Communicate Risk of Fall with Harm to all staff/Discuss with provider need for PT consult/Monitor gait and stability/Reinforce activity limits and safety measures with patient and family/Tailored Fall Risk Interventions/Use of alarms - bed, chair and/or voice tab/Visual Cue: Yellow wristband and red socks/Bed in lowest position, wheels locked, appropriate side rails in place/Call bell, personal items and telephone in reach/Instruct patient to call for assistance before getting out of bed or chair/Non-slip footwear when patient is out of bed/[place] to call system/Physically safe environment - no spills, clutter or unnecessary equipment/Purposeful Proactive Rounding/Room/bathroom lighting operational, light cord in reach

## 2023-03-30 NOTE — PROGRESS NOTE ADULT - PROBLEM SELECTOR PLAN 6
- will hold lasix iso hypo-Na  - per son: not sure about losartan or metoprolol as her medication, he will verify w/ the day team  - need to f/u accuracy of med rec  - c/w amlodipine 5 w/ holding parameters - will hold lasix iso hypo-Na  - c/w amlodipine 5 w/ holding parameters

## 2023-03-30 NOTE — CONSULT NOTE ADULT - ASSESSMENT
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 and night time AVAPS w/ frequent episodes of admission due to bronchiectasis, HTN, chronic constipation, presents with bronchiectasis exacerbation  - Tailor abx to prior cx for now, can start cefepime  - send sputum cx  - Airway clearance with duonebs q6h, Hypersal, Aerobika TID, Chest vest BID  - Symbicort 160 BID   - STOP spiriva while on standing duonebs  - c/w home Singulair  -- supplemental O2 and AVAPS QHS 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 and night time AVAPS w/ frequent episodes of admission due to bronchiectasis, HTN, chronic constipation, presents with bronchiectasis exacerbation  - Tailor abx to prior cx for now, can start cefepime  - send sputum cx  - Airway clearance with duonebs q6h, Hypersal, Aerobika BID, Chest vest BID  - Symbicort 160 BID   - STOP spiriva while on standing duonebs  - c/w home Singulair  -- supplemental O2 and AVAPS QHS

## 2023-03-30 NOTE — H&P ADULT - NSHPPHYSICALEXAM_GEN_ALL_CORE
VITALS:   T(C): 36.9 (03-30-23 @ 00:57), Max: 36.9 (03-30-23 @ 00:57)  HR: 93 (03-30-23 @ 00:57) (87 - 93)  BP: 148/62 (03-30-23 @ 00:57) (148/62 - 153/80)  RR: 17 (03-30-23 @ 00:57) (17 - 19)  SpO2: 100% (03-30-23 @ 00:57) (93% - 100%)    GENERAL: NAD, lying in bed comfortably, cachetic appearing  HEAD:  Atraumatic, normocephalic  EYES: EOMI, PERRLA, conjunctiva and sclera clear  ENT: Moist mucous membranes  NECK: Supple, no JVD  HEART: Regular rate and rhythm, no murmurs, rubs, or gallops  LUNGS: b/l wheezes heard  ABDOMEN: Soft, nontender, nondistended, +BS  EXTREMITIES: 2+ peripheral pulses bilaterally. No clubbing, cyanosis, or edema  NERVOUS SYSTEM:  A&Ox3, no focal deficits   SKIN: No rashes or lesions

## 2023-03-31 LAB
ALBUMIN SERPL ELPH-MCNC: 3.1 G/DL — LOW (ref 3.3–5)
ALP SERPL-CCNC: 218 U/L — HIGH (ref 40–120)
ALT FLD-CCNC: 60 U/L — HIGH (ref 4–33)
ANION GAP SERPL CALC-SCNC: 2 MMOL/L — LOW (ref 7–14)
ANION GAP SERPL CALC-SCNC: 6 MMOL/L — LOW (ref 7–14)
AST SERPL-CCNC: 38 U/L — HIGH (ref 4–32)
BASOPHILS # BLD AUTO: 0.02 K/UL — SIGNIFICANT CHANGE UP (ref 0–0.2)
BASOPHILS NFR BLD AUTO: 0.1 % — SIGNIFICANT CHANGE UP (ref 0–2)
BILIRUB SERPL-MCNC: 0.2 MG/DL — SIGNIFICANT CHANGE UP (ref 0.2–1.2)
BUN SERPL-MCNC: 12 MG/DL — SIGNIFICANT CHANGE UP (ref 7–23)
BUN SERPL-MCNC: 13 MG/DL — SIGNIFICANT CHANGE UP (ref 7–23)
CALCIUM SERPL-MCNC: 8.9 MG/DL — SIGNIFICANT CHANGE UP (ref 8.4–10.5)
CALCIUM SERPL-MCNC: 9 MG/DL — SIGNIFICANT CHANGE UP (ref 8.4–10.5)
CHLORIDE SERPL-SCNC: 91 MMOL/L — LOW (ref 98–107)
CHLORIDE SERPL-SCNC: 93 MMOL/L — LOW (ref 98–107)
CO2 SERPL-SCNC: 35 MMOL/L — HIGH (ref 22–31)
CO2 SERPL-SCNC: 37 MMOL/L — HIGH (ref 22–31)
CREAT SERPL-MCNC: 0.39 MG/DL — LOW (ref 0.5–1.3)
CREAT SERPL-MCNC: 0.44 MG/DL — LOW (ref 0.5–1.3)
EGFR: 107 ML/MIN/1.73M2 — SIGNIFICANT CHANGE UP
EGFR: 110 ML/MIN/1.73M2 — SIGNIFICANT CHANGE UP
EOSINOPHIL # BLD AUTO: 0.1 K/UL — SIGNIFICANT CHANGE UP (ref 0–0.5)
EOSINOPHIL NFR BLD AUTO: 0.7 % — SIGNIFICANT CHANGE UP (ref 0–6)
GLUCOSE BLDC GLUCOMTR-MCNC: 248 MG/DL — HIGH (ref 70–99)
GLUCOSE BLDC GLUCOMTR-MCNC: 270 MG/DL — HIGH (ref 70–99)
GLUCOSE BLDC GLUCOMTR-MCNC: 288 MG/DL — HIGH (ref 70–99)
GLUCOSE BLDC GLUCOMTR-MCNC: 300 MG/DL — HIGH (ref 70–99)
GLUCOSE SERPL-MCNC: 244 MG/DL — HIGH (ref 70–99)
GLUCOSE SERPL-MCNC: 335 MG/DL — HIGH (ref 70–99)
GRAM STN FLD: SIGNIFICANT CHANGE UP
HCT VFR BLD CALC: 41.2 % — SIGNIFICANT CHANGE UP (ref 34.5–45)
HGB BLD-MCNC: 13.3 G/DL — SIGNIFICANT CHANGE UP (ref 11.5–15.5)
IANC: 9.87 K/UL — HIGH (ref 1.8–7.4)
IMM GRANULOCYTES NFR BLD AUTO: 0.4 % — SIGNIFICANT CHANGE UP (ref 0–0.9)
LEGIONELLA AG UR QL: NEGATIVE — SIGNIFICANT CHANGE UP
LYMPHOCYTES # BLD AUTO: 15.7 % — SIGNIFICANT CHANGE UP (ref 13–44)
LYMPHOCYTES # BLD AUTO: 2.19 K/UL — SIGNIFICANT CHANGE UP (ref 1–3.3)
MAGNESIUM SERPL-MCNC: 2.1 MG/DL — SIGNIFICANT CHANGE UP (ref 1.6–2.6)
MAGNESIUM SERPL-MCNC: 2.3 MG/DL — SIGNIFICANT CHANGE UP (ref 1.6–2.6)
MCHC RBC-ENTMCNC: 27.4 PG — SIGNIFICANT CHANGE UP (ref 27–34)
MCHC RBC-ENTMCNC: 32.3 GM/DL — SIGNIFICANT CHANGE UP (ref 32–36)
MCV RBC AUTO: 84.8 FL — SIGNIFICANT CHANGE UP (ref 80–100)
MONOCYTES # BLD AUTO: 1.72 K/UL — HIGH (ref 0–0.9)
MONOCYTES NFR BLD AUTO: 12.3 % — SIGNIFICANT CHANGE UP (ref 2–14)
MRSA PCR RESULT.: SIGNIFICANT CHANGE UP
NEUTROPHILS # BLD AUTO: 9.87 K/UL — HIGH (ref 1.8–7.4)
NEUTROPHILS NFR BLD AUTO: 70.8 % — SIGNIFICANT CHANGE UP (ref 43–77)
NRBC # BLD: 0 /100 WBCS — SIGNIFICANT CHANGE UP (ref 0–0)
NRBC # FLD: 0 K/UL — SIGNIFICANT CHANGE UP (ref 0–0)
PHOSPHATE SERPL-MCNC: 2.7 MG/DL — SIGNIFICANT CHANGE UP (ref 2.5–4.5)
PHOSPHATE SERPL-MCNC: 3.4 MG/DL — SIGNIFICANT CHANGE UP (ref 2.5–4.5)
PLATELET # BLD AUTO: 191 K/UL — SIGNIFICANT CHANGE UP (ref 150–400)
POTASSIUM SERPL-MCNC: 4.4 MMOL/L — SIGNIFICANT CHANGE UP (ref 3.5–5.3)
POTASSIUM SERPL-MCNC: 5 MMOL/L — SIGNIFICANT CHANGE UP (ref 3.5–5.3)
POTASSIUM SERPL-SCNC: 4.4 MMOL/L — SIGNIFICANT CHANGE UP (ref 3.5–5.3)
POTASSIUM SERPL-SCNC: 5 MMOL/L — SIGNIFICANT CHANGE UP (ref 3.5–5.3)
PROT SERPL-MCNC: 6.2 G/DL — SIGNIFICANT CHANGE UP (ref 6–8.3)
RBC # BLD: 4.86 M/UL — SIGNIFICANT CHANGE UP (ref 3.8–5.2)
RBC # FLD: 13.4 % — SIGNIFICANT CHANGE UP (ref 10.3–14.5)
S AUREUS DNA NOSE QL NAA+PROBE: DETECTED
SODIUM SERPL-SCNC: 132 MMOL/L — LOW (ref 135–145)
SODIUM SERPL-SCNC: 132 MMOL/L — LOW (ref 135–145)
SPECIMEN SOURCE: SIGNIFICANT CHANGE UP
WBC # BLD: 13.96 K/UL — HIGH (ref 3.8–10.5)
WBC # FLD AUTO: 13.96 K/UL — HIGH (ref 3.8–10.5)

## 2023-03-31 PROCEDURE — 93010 ELECTROCARDIOGRAM REPORT: CPT

## 2023-03-31 PROCEDURE — 71250 CT THORAX DX C-: CPT | Mod: 26

## 2023-03-31 PROCEDURE — 99233 SBSQ HOSP IP/OBS HIGH 50: CPT | Mod: GC

## 2023-03-31 RX ORDER — LEVALBUTEROL 1.25 MG/.5ML
1.25 SOLUTION, CONCENTRATE RESPIRATORY (INHALATION) EVERY 4 HOURS
Refills: 0 | Status: DISCONTINUED | OUTPATIENT
Start: 2023-03-31 | End: 2023-04-01

## 2023-03-31 RX ORDER — METOPROLOL TARTRATE 50 MG
50 TABLET ORAL DAILY
Refills: 0 | Status: DISCONTINUED | OUTPATIENT
Start: 2023-03-31 | End: 2023-04-11

## 2023-03-31 RX ORDER — MIRTAZAPINE 45 MG/1
7.5 TABLET, ORALLY DISINTEGRATING ORAL ONCE
Refills: 0 | Status: COMPLETED | OUTPATIENT
Start: 2023-03-31 | End: 2023-03-31

## 2023-03-31 RX ADMIN — Medication 3: at 12:38

## 2023-03-31 RX ADMIN — Medication 650 MILLIGRAM(S): at 10:18

## 2023-03-31 RX ADMIN — LEVALBUTEROL 1.25 MILLIGRAM(S): 1.25 SOLUTION, CONCENTRATE RESPIRATORY (INHALATION) at 22:10

## 2023-03-31 RX ADMIN — Medication 1 SPRAY(S): at 07:26

## 2023-03-31 RX ADMIN — Medication 500000 UNIT(S): at 17:51

## 2023-03-31 RX ADMIN — MIRTAZAPINE 7.5 MILLIGRAM(S): 45 TABLET, ORALLY DISINTEGRATING ORAL at 22:33

## 2023-03-31 RX ADMIN — Medication 1 SPRAY(S): at 17:50

## 2023-03-31 RX ADMIN — Medication 5 UNIT(S): at 17:51

## 2023-03-31 RX ADMIN — SODIUM CHLORIDE 4 MILLILITER(S): 9 INJECTION INTRAMUSCULAR; INTRAVENOUS; SUBCUTANEOUS at 22:10

## 2023-03-31 RX ADMIN — SENNA PLUS 2 TABLET(S): 8.6 TABLET ORAL at 22:33

## 2023-03-31 RX ADMIN — ENOXAPARIN SODIUM 30 MILLIGRAM(S): 100 INJECTION SUBCUTANEOUS at 23:03

## 2023-03-31 RX ADMIN — Medication 650 MILLIGRAM(S): at 10:57

## 2023-03-31 RX ADMIN — MIRTAZAPINE 7.5 MILLIGRAM(S): 45 TABLET, ORALLY DISINTEGRATING ORAL at 11:36

## 2023-03-31 RX ADMIN — INSULIN GLARGINE 8 UNIT(S): 100 INJECTION, SOLUTION SUBCUTANEOUS at 22:32

## 2023-03-31 RX ADMIN — POLYETHYLENE GLYCOL 3350 17 GRAM(S): 17 POWDER, FOR SOLUTION ORAL at 07:25

## 2023-03-31 RX ADMIN — Medication 5 UNIT(S): at 08:46

## 2023-03-31 RX ADMIN — Medication 500000 UNIT(S): at 07:25

## 2023-03-31 RX ADMIN — Medication 500000 UNIT(S): at 11:36

## 2023-03-31 RX ADMIN — MONTELUKAST 10 MILLIGRAM(S): 4 TABLET, CHEWABLE ORAL at 11:36

## 2023-03-31 RX ADMIN — CHLORHEXIDINE GLUCONATE 1 APPLICATION(S): 213 SOLUTION TOPICAL at 11:36

## 2023-03-31 RX ADMIN — CEFEPIME 100 MILLIGRAM(S): 1 INJECTION, POWDER, FOR SOLUTION INTRAMUSCULAR; INTRAVENOUS at 07:26

## 2023-03-31 RX ADMIN — LEVALBUTEROL 1.25 MILLIGRAM(S): 1.25 SOLUTION, CONCENTRATE RESPIRATORY (INHALATION) at 15:22

## 2023-03-31 RX ADMIN — Medication 3: at 08:46

## 2023-03-31 RX ADMIN — Medication 3: at 17:49

## 2023-03-31 RX ADMIN — AMLODIPINE BESYLATE 5 MILLIGRAM(S): 2.5 TABLET ORAL at 07:26

## 2023-03-31 RX ADMIN — CEFEPIME 100 MILLIGRAM(S): 1 INJECTION, POWDER, FOR SOLUTION INTRAMUSCULAR; INTRAVENOUS at 18:02

## 2023-03-31 RX ADMIN — Medication 50 MILLIGRAM(S): at 14:49

## 2023-03-31 RX ADMIN — BUDESONIDE AND FORMOTEROL FUMARATE DIHYDRATE 2 PUFF(S): 160; 4.5 AEROSOL RESPIRATORY (INHALATION) at 22:32

## 2023-03-31 RX ADMIN — AZITHROMYCIN 255 MILLIGRAM(S): 500 TABLET, FILM COATED ORAL at 04:54

## 2023-03-31 RX ADMIN — Medication 4 MILLILITER(S): at 22:09

## 2023-03-31 RX ADMIN — Medication 5 UNIT(S): at 12:51

## 2023-03-31 RX ADMIN — POLYETHYLENE GLYCOL 3350 17 GRAM(S): 17 POWDER, FOR SOLUTION ORAL at 18:02

## 2023-03-31 RX ADMIN — PANTOPRAZOLE SODIUM 40 MILLIGRAM(S): 20 TABLET, DELAYED RELEASE ORAL at 07:25

## 2023-03-31 RX ADMIN — KETOTIFEN FUMARATE 1 DROP(S): 0.34 SOLUTION OPHTHALMIC at 03:34

## 2023-03-31 RX ADMIN — KETOTIFEN FUMARATE 1 DROP(S): 0.34 SOLUTION OPHTHALMIC at 23:03

## 2023-03-31 RX ADMIN — Medication 3 MILLILITER(S): at 03:46

## 2023-03-31 RX ADMIN — BUDESONIDE AND FORMOTEROL FUMARATE DIHYDRATE 2 PUFF(S): 160; 4.5 AEROSOL RESPIRATORY (INHALATION) at 08:51

## 2023-03-31 NOTE — ED PROVIDER NOTE - OBJECTIVE STATEMENT
See paper chart for documentation, I am not the provider for this patient, my role is in downtime documentation.

## 2023-03-31 NOTE — PROGRESS NOTE ADULT - PROBLEM SELECTOR PLAN 4
- Na 117 at admission, (123 w/ correction)  baseline 135 outpatient  - ddx include Legionaire disease considering respiratory distress vs poor PO intake vs SIADH  - s/p 1L of NS in the ED   - fluid restriction 1.2L  - Na with correction of 127 today  - f/u hyponatremia workup - Na 117 at admission, (123 w/ correction)  baseline 135 outpatient, 132 w/ correction this AM   - ddx include Legionaire disease considering respiratory distress vs poor PO intake vs SIADH  - s/p 1L of NS in the ED   - fluid restriction 1.2L  - hyponatremia workup consistent w/ SIADH however patient appears volume down

## 2023-03-31 NOTE — DIETITIAN INITIAL EVALUATION ADULT - PROBLEM SELECTOR PLAN 6
- will hold lasix iso hypo-Na  - per son: not sure about losartan or metoprolol as her medication, he will verify w/ the day team  - need to f/u accuracy of med rec  - c/w amlodipine 5 w/ holding parameters

## 2023-03-31 NOTE — DIETITIAN INITIAL EVALUATION ADULT - ADD RECOMMEND
1) Recommend continue with Glucerna  3x daily (660kcals, 30g protein) to provide optimal nutrition.   2) Encourage PO intake and honor food preferences as able.   3) Monitor PO intake, Labs, weights, BMs, and skin integrity.   4) RD to remain available for further nutritional interventions as indicated.  1) Recommend continue with Glucerna  3x daily (660kcals, 30g protein) to provide optimal nutrition.   2) Encourage PO intake and honor food preferences as able.   3) Monitor PO intake, Labs, weights, BMs, and skin integrity.   4) Recommend follow up with outpatient RD after discharge.   5) RD to remain available for further nutritional interventions as indicated.

## 2023-03-31 NOTE — PROGRESS NOTE ADULT - SUBJECTIVE AND OBJECTIVE BOX
CHIEF COMPLAINT:Patient is a 68y old  Female who presents with a chief complaint of SOB (31 Mar 2023 17:41)      Interval Events:  No new concerns, started on cefepime yesterday.    REVIEW OF SYSTEMS:  [x] All other systems negative except per HPI   [ ] Unable to assess ROS because ________    OBJECTIVE:  ICU Vital Signs Last 24 Hrs  T(C): 36.8 (31 Mar 2023 14:45), Max: 37.2 (31 Mar 2023 08:04)  T(F): 98.3 (31 Mar 2023 14:45), Max: 99 (31 Mar 2023 08:04)  HR: 85 (31 Mar 2023 17:28) (80 - 138)  BP: 169/83 (31 Mar 2023 14:45) (150/69 - 169/83)  BP(mean): --  ABP: --  ABP(mean): --  RR: 19 (31 Mar 2023 17:28) (18 - 20)  SpO2: 98% (31 Mar 2023 17:28) (94% - 100%)    O2 Parameters below as of 31 Mar 2023 17:28  Patient On (Oxygen Delivery Method): nasal cannula  O2 Flow (L/min): 3        Mode: standby    03-30 @ 07:01  -  03-31 @ 07:00  --------------------------------------------------------  IN: 400 mL / OUT: 300 mL / NET: 100 mL        PHYSICAL EXAM:  Gen: NAD  HEENT: MMM, PERRL, EOMI  Neck: No JVP elevation  CV: RRR, no m/r/g  Lung: improvement in rhonchorous BS but still present  Abd: Soft, NT, ND  Ext: WWP, no CCE  Skin: No rash  Neuro: A&Ox3, no focal deficits    HOSPITAL MEDICATIONS:  MEDICATIONS  (STANDING):  acetylcysteine 10%  Inhalation 4 milliLiter(s) Inhalation two times a day  amLODIPine   Tablet 5 milliGRAM(s) Oral daily  budesonide 160 MICROgram(s)/formoterol 4.5 MICROgram(s) Inhaler 2 Puff(s) Inhalation two times a day  cefepime   IVPB 2000 milliGRAM(s) IV Intermittent every 12 hours  cefepime   IVPB      chlorhexidine 2% Cloths 1 Application(s) Topical daily  dextrose 5%. 1000 milliLiter(s) (50 mL/Hr) IV Continuous <Continuous>  dextrose 5%. 1000 milliLiter(s) (100 mL/Hr) IV Continuous <Continuous>  dextrose 50% Injectable 25 Gram(s) IV Push once  dextrose 50% Injectable 12.5 Gram(s) IV Push once  dextrose 50% Injectable 25 Gram(s) IV Push once  dextrose Oral Gel 15 Gram(s) Oral once  enoxaparin Injectable 30 milliGRAM(s) SubCutaneous every 24 hours  fluticasone propionate 50 MICROgram(s)/spray Nasal Spray 1 Spray(s) Both Nostrils two times a day  glucagon  Injectable 1 milliGRAM(s) IntraMuscular once  influenza  Vaccine (HIGH DOSE) 0.7 milliLiter(s) IntraMuscular once  insulin glargine Injectable (LANTUS) 8 Unit(s) SubCutaneous at bedtime  insulin lispro (ADMELOG) corrective regimen sliding scale   SubCutaneous three times a day before meals  insulin lispro (ADMELOG) corrective regimen sliding scale   SubCutaneous at bedtime  insulin lispro Injectable (ADMELOG) 5 Unit(s) SubCutaneous three times a day before meals  ketotifen 0.025% Ophthalmic Solution 1 Drop(s) Both EYES two times a day  levalbuterol Inhalation 1.25 milliGRAM(s) Inhalation every 4 hours  metoprolol succinate ER 50 milliGRAM(s) Oral daily  mirtazapine 7.5 milliGRAM(s) Oral at bedtime  montelukast 10 milliGRAM(s) Oral daily  nystatin    Suspension 574964 Unit(s) Oral four times a day  pantoprazole    Tablet 40 milliGRAM(s) Oral before breakfast  polyethylene glycol 3350 17 Gram(s) Oral two times a day  senna 2 Tablet(s) Oral at bedtime  sodium chloride 3%  Inhalation 4 milliLiter(s) Inhalation every 12 hours  sorbitol 70%/mineral oil/magnesium hydroxide/glycerin Enema 120 milliLiter(s) Rectal once    MEDICATIONS  (PRN):  acetaminophen     Tablet .. 650 milliGRAM(s) Oral every 6 hours PRN Temp greater or equal to 38C (100.4F), Mild Pain (1 - 3)  aluminum hydroxide/magnesium hydroxide/simethicone Suspension 30 milliLiter(s) Oral every 4 hours PRN Dyspepsia  bisacodyl Suppository 10 milliGRAM(s) Rectal daily PRN Constipation  guaifenesin/dextromethorphan Oral Liquid 10 milliLiter(s) Oral every 8 hours PRN Cough  melatonin 3 milliGRAM(s) Oral at bedtime PRN Insomnia  ondansetron Injectable 4 milliGRAM(s) IV Push every 8 hours PRN Nausea and/or Vomiting      LABS:    The Labs were reviewed by me   The Radiology was reviewed by me    EKG tracing reviewed by me    03-31    132<L>  |  93<L>  |  13  ----------------------------<  335<H>  5.0   |  37<H>  |  0.39<L>  03-31    132<L>  |  91<L>  |  12  ----------------------------<  244<H>  4.4   |  35<H>  |  0.44<L>  03-30    129<L>  |  90<L>  |  15  ----------------------------<  237<H>  4.4   |  34<H>  |  0.41<L>    Ca    8.9      31 Mar 2023 17:30  Ca    9.0      31 Mar 2023 06:00  Ca    9.0      30 Mar 2023 23:00  Phos  3.4     03-31  Mg     2.30     03-31    TPro  6.2  /  Alb  3.1<L>  /  TBili  0.2  /  DBili  x   /  AST  38<H>  /  ALT  60<H>  /  AlkPhos  218<H>  03-31  TPro  6.5  /  Alb  3.4  /  TBili  0.3  /  DBili  x   /  AST  42<H>  /  ALT  65<H>  /  AlkPhos  218<H>  03-30  TPro  7.4  /  Alb  4.0  /  TBili  0.4  /  DBili  x   /  AST  44<H>  /  ALT  64<H>  /  AlkPhos  259<H>  03-29    Magnesium, Serum: 2.30 mg/dL (03-31-23 @ 17:30)  Magnesium, Serum: 2.10 mg/dL (03-31-23 @ 06:00)  Magnesium, Serum: 2.10 mg/dL (03-30-23 @ 23:00)  Magnesium, Serum: 2.20 mg/dL (03-30-23 @ 09:45)  Magnesium, Serum: 1.90 mg/dL (03-29-23 @ 15:20)    Phosphorus Level, Serum: 3.4 mg/dL (03-31-23 @ 17:30)  Phosphorus Level, Serum: 2.7 mg/dL (03-31-23 @ 06:00)  Phosphorus Level, Serum: 2.6 mg/dL (03-30-23 @ 23:00)  Phosphorus Level, Serum: 2.5 mg/dL (03-30-23 @ 09:45)                                              13.3   13.96 )-----------( 191      ( 31 Mar 2023 06:00 )             41.2                         12.6   15.28 )-----------( 192      ( 30 Mar 2023 09:45 )             39.3                         14.0   15.24 )-----------( 179      ( 29 Mar 2023 15:20 )             43.2     CAPILLARY BLOOD GLUCOSE      POCT Blood Glucose.: 300 mg/dL (31 Mar 2023 17:15)  POCT Blood Glucose.: 270 mg/dL (31 Mar 2023 12:21)  POCT Blood Glucose.: 288 mg/dL (31 Mar 2023 08:24)  POCT Blood Glucose.: 174 mg/dL (30 Mar 2023 22:18)        MICROBIOLOGY:     RADIOLOGY:  [ ] Reviewed and interpreted by me    Point of Care Ultrasound Findings:    PFT:    EKG:

## 2023-03-31 NOTE — DIETITIAN INITIAL EVALUATION ADULT - OTHER INFO
Per chart review, 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 and night time AVAPS w/ frequent episodes of admission due to bronchiectasis, HTN, chronic constipation, presented to the ED due to SOB for three days found to meet sepsis criteria.     Patient seen at bedside. Offered Language Line [facility] . Pt prefers daughter in law at bedside to translate. As per pt her appetite has been slightly improved in hospital. Pt noted on Remeron. Lunch visibly seen with 100% intake today. Diet has been supplementing with Glucerna 3x daily (660kcals, 30g protein).  Pt noted on soft and bite sized diet. Denies chewing and swallowing difficulties. Denies any GI distress (nausea/vomiting/diarrhea/constipation.) Last BM 3/30 as per RN flowsheet. Bowel regimen in use.     Provided pt with handout Carbohydrate Counting for People with Diabetes. Discussed carbohydrate sources, carbohydrate portions, protein sources, mixed meals, and nutrition label reading. Stressed importance of balanced meals with adequate protein and fiber.

## 2023-03-31 NOTE — PROGRESS NOTE ADULT - SUBJECTIVE AND OBJECTIVE BOX
**************************************  Agnes Jorge, PGY-1  After 7PM, please contact night float at [phone] or [phone]  **************************************    INTERVAL HPI/OVERNIGHT EVENTS:  Patient was seen and examined at bedside. As per nurse and patient, no o/n events, patient resting comfortably. No complaints at this time. Patient denies: fever, chills, dizziness, weakness, HA, Changes in vision, CP, palpitations, SOB, cough, N/V/D/C, dysuria, changes in bowel movements, LE edema. ROS otherwise negative.    VITAL SIGNS:  T(F): 97.7 (03-31-23 @ 05:44)  HR: 105 (03-31-23 @ 05:44)  BP: 150/69 (03-31-23 @ 05:44)  RR: 19 (03-31-23 @ 05:44)  SpO2: 98% (03-31-23 @ 05:44)  Wt(kg): --    PHYSICAL EXAM:    Constitutional: WDWN, NAD  HEENT: PERRL, EOMI, sclera non-icteric, neck supple, trachea midline, no masses, no JVD, MMM, good dentition  Respiratory: CTA b/l, good air entry b/l, no wheezing, no rhonchi, no rales, without accessory muscle use and no intercostal retractions  Cardiovascular: RRR, normal S1S2, no M/R/G  Gastrointestinal: soft, NTND, no masses palpable, BS normal  Extremities: Warm, well perfused, pulses equal bilateral upper and lower extremities, no edema, no clubbing. Capillary refill <2 sec  Neurological: AAOx3, CN Grossly intact  Skin: Normal temperature, warm, dry    MEDICATIONS  (STANDING):  acetylcysteine 10%  Inhalation 4 milliLiter(s) Inhalation two times a day  albuterol/ipratropium for Nebulization 3 milliLiter(s) Nebulizer every 6 hours  amLODIPine   Tablet 5 milliGRAM(s) Oral daily  azithromycin  IVPB 500 milliGRAM(s) IV Intermittent every 24 hours  azithromycin  IVPB      budesonide 160 MICROgram(s)/formoterol 4.5 MICROgram(s) Inhaler 2 Puff(s) Inhalation two times a day  cefepime   IVPB 2000 milliGRAM(s) IV Intermittent every 12 hours  cefepime   IVPB      chlorhexidine 2% Cloths 1 Application(s) Topical daily  dextrose 5%. 1000 milliLiter(s) (50 mL/Hr) IV Continuous <Continuous>  dextrose 5%. 1000 milliLiter(s) (100 mL/Hr) IV Continuous <Continuous>  dextrose 50% Injectable 25 Gram(s) IV Push once  dextrose 50% Injectable 12.5 Gram(s) IV Push once  dextrose 50% Injectable 25 Gram(s) IV Push once  dextrose Oral Gel 15 Gram(s) Oral once  enoxaparin Injectable 30 milliGRAM(s) SubCutaneous every 24 hours  fluticasone propionate 50 MICROgram(s)/spray Nasal Spray 1 Spray(s) Both Nostrils two times a day  glucagon  Injectable 1 milliGRAM(s) IntraMuscular once  influenza  Vaccine (HIGH DOSE) 0.7 milliLiter(s) IntraMuscular once  insulin glargine Injectable (LANTUS) 8 Unit(s) SubCutaneous at bedtime  insulin lispro (ADMELOG) corrective regimen sliding scale   SubCutaneous three times a day before meals  insulin lispro (ADMELOG) corrective regimen sliding scale   SubCutaneous at bedtime  insulin lispro Injectable (ADMELOG) 5 Unit(s) SubCutaneous three times a day before meals  ketotifen 0.025% Ophthalmic Solution 1 Drop(s) Both EYES two times a day  mirtazapine 7.5 milliGRAM(s) Oral at bedtime  montelukast 10 milliGRAM(s) Oral daily  nystatin    Suspension 111680 Unit(s) Oral four times a day  pantoprazole    Tablet 40 milliGRAM(s) Oral before breakfast  polyethylene glycol 3350 17 Gram(s) Oral two times a day  senna 2 Tablet(s) Oral at bedtime  sodium chloride 3%  Inhalation 4 milliLiter(s) Inhalation every 12 hours    MEDICATIONS  (PRN):  acetaminophen     Tablet .. 650 milliGRAM(s) Oral every 6 hours PRN Temp greater or equal to 38C (100.4F), Mild Pain (1 - 3)  aluminum hydroxide/magnesium hydroxide/simethicone Suspension 30 milliLiter(s) Oral every 4 hours PRN Dyspepsia  bisacodyl Suppository 10 milliGRAM(s) Rectal daily PRN Constipation  guaifenesin/dextromethorphan Oral Liquid 10 milliLiter(s) Oral every 8 hours PRN Cough  melatonin 3 milliGRAM(s) Oral at bedtime PRN Insomnia  ondansetron Injectable 4 milliGRAM(s) IV Push every 8 hours PRN Nausea and/or Vomiting      Allergies    No Known Allergies    Intolerances        LABS:                        13.3   13.96 )-----------( 191      ( 31 Mar 2023 06:00 )             41.2     03-31    132<L>  |  91<L>  |  12  ----------------------------<  244<H>  4.4   |  35<H>  |  0.44<L>    Ca    9.0      31 Mar 2023 06:00  Phos  2.7     03-31  Mg     2.10     03-31    TPro  6.2  /  Alb  3.1<L>  /  TBili  0.2  /  DBili  x   /  AST  38<H>  /  ALT  60<H>  /  AlkPhos  218<H>  03-31    PT/INR - ( 29 Mar 2023 15:20 )   PT: 11.5 sec;   INR: 0.99 ratio         PTT - ( 29 Mar 2023 15:20 )  PTT:25.7 sec      RADIOLOGY & ADDITIONAL TESTS:  Reviewed **************************************  Agnes Jorge, PGY-1  After 7PM, please contact night float at #[phone] or [phone]  **************************************    INTERVAL HPI/OVERNIGHT EVENTS:  Patient was seen and examined at bedside. Patient endorsing persistent cough w/ sputum this AM. Denies fevers, chills, SOB, CP, nausea, vomiting. Patient breathing comfortably on NC and tolerated AVAPS well overnight.     VITAL SIGNS:  T(F): 97.7 (03-31-23 @ 05:44)  HR: 105 (03-31-23 @ 05:44)  BP: 150/69 (03-31-23 @ 05:44)  RR: 19 (03-31-23 @ 05:44)  SpO2: 98% (03-31-23 @ 05:44)  Wt(kg): --    PHYSICAL EXAM:  GENERAL: NAD, lying in bed comfortable, appears older than chronological age, Nasal cannula in place with no respiratory distress.   HEAD:  Atraumatic, normocephalic  EYES: EOMI, PERRLA, conjunctiva and sclera clear  ENT: Moist mucous membranes  NECK: Supple, no JVD  HEART: Tachycardic rate, regular rhythm, no murmurs, rubs, or gallops  LUNGS: b/l wheezes heard  ABDOMEN: Soft, nontender, nondistended, +BS  EXTREMITIES: 2+ peripheral pulses bilaterally. No clubbing, cyanosis, or edema  NERVOUS SYSTEM:  A&Ox3, no focal deficits   SKIN: No rashes or lesions        MEDICATIONS  (STANDING):  acetylcysteine 10%  Inhalation 4 milliLiter(s) Inhalation two times a day  albuterol/ipratropium for Nebulization 3 milliLiter(s) Nebulizer every 6 hours  amLODIPine   Tablet 5 milliGRAM(s) Oral daily  azithromycin  IVPB 500 milliGRAM(s) IV Intermittent every 24 hours  azithromycin  IVPB      budesonide 160 MICROgram(s)/formoterol 4.5 MICROgram(s) Inhaler 2 Puff(s) Inhalation two times a day  cefepime   IVPB 2000 milliGRAM(s) IV Intermittent every 12 hours  cefepime   IVPB      chlorhexidine 2% Cloths 1 Application(s) Topical daily  dextrose 5%. 1000 milliLiter(s) (50 mL/Hr) IV Continuous <Continuous>  dextrose 5%. 1000 milliLiter(s) (100 mL/Hr) IV Continuous <Continuous>  dextrose 50% Injectable 25 Gram(s) IV Push once  dextrose 50% Injectable 12.5 Gram(s) IV Push once  dextrose 50% Injectable 25 Gram(s) IV Push once  dextrose Oral Gel 15 Gram(s) Oral once  enoxaparin Injectable 30 milliGRAM(s) SubCutaneous every 24 hours  fluticasone propionate 50 MICROgram(s)/spray Nasal Spray 1 Spray(s) Both Nostrils two times a day  glucagon  Injectable 1 milliGRAM(s) IntraMuscular once  influenza  Vaccine (HIGH DOSE) 0.7 milliLiter(s) IntraMuscular once  insulin glargine Injectable (LANTUS) 8 Unit(s) SubCutaneous at bedtime  insulin lispro (ADMELOG) corrective regimen sliding scale   SubCutaneous three times a day before meals  insulin lispro (ADMELOG) corrective regimen sliding scale   SubCutaneous at bedtime  insulin lispro Injectable (ADMELOG) 5 Unit(s) SubCutaneous three times a day before meals  ketotifen 0.025% Ophthalmic Solution 1 Drop(s) Both EYES two times a day  mirtazapine 7.5 milliGRAM(s) Oral at bedtime  montelukast 10 milliGRAM(s) Oral daily  nystatin    Suspension 982376 Unit(s) Oral four times a day  pantoprazole    Tablet 40 milliGRAM(s) Oral before breakfast  polyethylene glycol 3350 17 Gram(s) Oral two times a day  senna 2 Tablet(s) Oral at bedtime  sodium chloride 3%  Inhalation 4 milliLiter(s) Inhalation every 12 hours    MEDICATIONS  (PRN):  acetaminophen     Tablet .. 650 milliGRAM(s) Oral every 6 hours PRN Temp greater or equal to 38C (100.4F), Mild Pain (1 - 3)  aluminum hydroxide/magnesium hydroxide/simethicone Suspension 30 milliLiter(s) Oral every 4 hours PRN Dyspepsia  bisacodyl Suppository 10 milliGRAM(s) Rectal daily PRN Constipation  guaifenesin/dextromethorphan Oral Liquid 10 milliLiter(s) Oral every 8 hours PRN Cough  melatonin 3 milliGRAM(s) Oral at bedtime PRN Insomnia  ondansetron Injectable 4 milliGRAM(s) IV Push every 8 hours PRN Nausea and/or Vomiting      Allergies    No Known Allergies    Intolerances        LABS:                        13.3   13.96 )-----------( 191      ( 31 Mar 2023 06:00 )             41.2     03-31    132<L>  |  91<L>  |  12  ----------------------------<  244<H>  4.4   |  35<H>  |  0.44<L>    Ca    9.0      31 Mar 2023 06:00  Phos  2.7     03-31  Mg     2.10     03-31    TPro  6.2  /  Alb  3.1<L>  /  TBili  0.2  /  DBili  x   /  AST  38<H>  /  ALT  60<H>  /  AlkPhos  218<H>  03-31    PT/INR - ( 29 Mar 2023 15:20 )   PT: 11.5 sec;   INR: 0.99 ratio         PTT - ( 29 Mar 2023 15:20 )  PTT:25.7 sec      RADIOLOGY & ADDITIONAL TESTS:  Reviewed

## 2023-03-31 NOTE — DIETITIAN INITIAL EVALUATION ADULT - NS FNS DIET ORDER
Soft and Bite Sized  Consistent Carbohydrate, DASH/TLC, 1200ml fluid restriction,  Supplement Feeding Modality: oral Glucerna Shake 3 times a day.

## 2023-03-31 NOTE — PROGRESS NOTE ADULT - PROBLEM SELECTOR PLAN 3
- pt constipated per HPI and CT scan  - no bowel obstruction appreciated on exam, abd soft non-tender  - senna, miralax  - if needed can try dulcolax supp, oral Mg or even tap water enema - pt constipated per HPI and CT scan  - no bowel obstruction appreciated on exam, abd soft non-tender  - senna, miralax BID, dulcolax, SMOG enema   - BM today, s/p SMOG enema

## 2023-03-31 NOTE — DIETITIAN INITIAL EVALUATION ADULT - ORAL INTAKE PTA/DIET HISTORY
Diet hx obtained via pt's daughter in law. Daughter-in-law reports patient's appetite has been decreased associated with excessive phlegm production, able to consume 3 small meals with tea and crackers in bt her meals. Daughter-in-law reports pt checks her FS pre-meal, 3x daily.

## 2023-03-31 NOTE — DIETITIAN INITIAL EVALUATION ADULT - PROBLEM SELECTOR PLAN 4
- Na 117 at admission, baseline 135 outpatient  - ddx include Legionaire disease considering respiratory distress vs poor PO intake vs SIADH  - will need urine studies to identify etiology of hypo-Na  - fluid restriction 1.2L

## 2023-03-31 NOTE — PROGRESS NOTE ADULT - PROBLEM SELECTOR PLAN 1
- pt met sepsis criteria due to elevated WBC, increased WOB (RR), and elevated lactate  - s/p ceftriaxone in the ED  - outpt record showed colonization of sputum w/ Pseudomonas and Klebsiella  - on zithromax, pending Pulm recs   - f/u urine legionella  - f/u blood culture and sputum culture,   - VBG 7.33/74/34/39 vbg lactate of 2.2, repeat in the AM. - pt met sepsis criteria due to elevated WBC, increased WOB (RR), and elevated lactate  - s/p ceftriaxone in the ED  - outpt record showed colonization of sputum w/ Pseudomonas and Klebsiella  - on zithromax, pending Pulm recs   - urine legionalla negative   - Bcx NGTD, Scx NGTD   -

## 2023-03-31 NOTE — DIETITIAN INITIAL EVALUATION ADULT - PERTINENT MEDS FT
MEDICATIONS  (STANDING):  acetylcysteine 10%  Inhalation 4 milliLiter(s) Inhalation two times a day  amLODIPine   Tablet 5 milliGRAM(s) Oral daily  azithromycin  IVPB 500 milliGRAM(s) IV Intermittent every 24 hours  azithromycin  IVPB      budesonide 160 MICROgram(s)/formoterol 4.5 MICROgram(s) Inhaler 2 Puff(s) Inhalation two times a day  cefepime   IVPB 2000 milliGRAM(s) IV Intermittent every 12 hours  cefepime   IVPB      chlorhexidine 2% Cloths 1 Application(s) Topical daily  dextrose 5%. 1000 milliLiter(s) (50 mL/Hr) IV Continuous <Continuous>  dextrose 5%. 1000 milliLiter(s) (100 mL/Hr) IV Continuous <Continuous>  dextrose 50% Injectable 25 Gram(s) IV Push once  dextrose 50% Injectable 12.5 Gram(s) IV Push once  dextrose 50% Injectable 25 Gram(s) IV Push once  dextrose Oral Gel 15 Gram(s) Oral once  enoxaparin Injectable 30 milliGRAM(s) SubCutaneous every 24 hours  fluticasone propionate 50 MICROgram(s)/spray Nasal Spray 1 Spray(s) Both Nostrils two times a day  glucagon  Injectable 1 milliGRAM(s) IntraMuscular once  influenza  Vaccine (HIGH DOSE) 0.7 milliLiter(s) IntraMuscular once  insulin glargine Injectable (LANTUS) 8 Unit(s) SubCutaneous at bedtime  insulin lispro (ADMELOG) corrective regimen sliding scale   SubCutaneous three times a day before meals  insulin lispro (ADMELOG) corrective regimen sliding scale   SubCutaneous at bedtime  insulin lispro Injectable (ADMELOG) 5 Unit(s) SubCutaneous three times a day before meals  ketotifen 0.025% Ophthalmic Solution 1 Drop(s) Both EYES two times a day  levalbuterol Inhalation 1.25 milliGRAM(s) Inhalation every 4 hours  metoprolol succinate ER 50 milliGRAM(s) Oral daily  mirtazapine 7.5 milliGRAM(s) Oral at bedtime  montelukast 10 milliGRAM(s) Oral daily  nystatin    Suspension 555335 Unit(s) Oral four times a day  pantoprazole    Tablet 40 milliGRAM(s) Oral before breakfast  polyethylene glycol 3350 17 Gram(s) Oral two times a day  senna 2 Tablet(s) Oral at bedtime  sodium chloride 3%  Inhalation 4 milliLiter(s) Inhalation every 12 hours  sorbitol 70%/mineral oil/magnesium hydroxide/glycerin Enema 120 milliLiter(s) Rectal once    MEDICATIONS  (PRN):  acetaminophen     Tablet .. 650 milliGRAM(s) Oral every 6 hours PRN Temp greater or equal to 38C (100.4F), Mild Pain (1 - 3)  aluminum hydroxide/magnesium hydroxide/simethicone Suspension 30 milliLiter(s) Oral every 4 hours PRN Dyspepsia  bisacodyl Suppository 10 milliGRAM(s) Rectal daily PRN Constipation  guaifenesin/dextromethorphan Oral Liquid 10 milliLiter(s) Oral every 8 hours PRN Cough  melatonin 3 milliGRAM(s) Oral at bedtime PRN Insomnia  ondansetron Injectable 4 milliGRAM(s) IV Push every 8 hours PRN Nausea and/or Vomiting

## 2023-03-31 NOTE — PROGRESS NOTE ADULT - PROBLEM SELECTOR PLAN 2
- Hx of PCD, difficulty clearing mucus per pt  - aggressive pulm toileting, including chest PT, mucomyst, hypersal, chest vest q12h, aerobika q12h   - c/w symbicort, singular  - pulm consulted, follow recs   - f/u CT chest w/ IV contrast - Hx of PCD, difficulty clearing mucus per pt  - aggressive pulm toileting, including chest PT, mucomyst, hypersal, chest vest q12h, aerobika q12h   - c/w symbicort, singular  - now on xopenex 2/2 sinus tach   - pulm consulted, follow recs   - CT Chest: New clustered nodules/tree-in-bud opacities in the right upper and left lower lobes suggesting infectious etiology with endobronchial spread.

## 2023-03-31 NOTE — PROGRESS NOTE ADULT - ASSESSMENT
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

## 2023-03-31 NOTE — DIETITIAN INITIAL EVALUATION ADULT - NSPROEDALEARNPREF_GEN_A_NUR
Encouraged pt to increase protein and calorie dense food intake, and control CHO intake. Discussed the portion size of CHO should be at each meal./verbal instruction/written material

## 2023-03-31 NOTE — PROGRESS NOTE ADULT - PROBLEM SELECTOR PLAN 6
- will hold lasix iso hypo-Na  - c/w amlodipine 5 w/ holding parameters - will hold lasix iso hypo-Na  - c/w amlodipine 5 w/ holding parameters  - started on metoprolol succinate 50 mg qd home dose

## 2023-03-31 NOTE — PROGRESS NOTE ADULT - ATTENDING COMMENTS
68F with PMH of primary ciliary dyskinesia, chronic hypoxic respiratory failure on Home O2 and AVAPS qHS, HTN, bronchietasis, chronic constipation presenting with SOB. Initial CT A/P showed large stool burden w/o obstruction. Visualized lung showed mucous plugging and bronchiectasis. Initial labs w/ leukocytosis, hyponatremia (down to 117), hyperglycemia 300s, mildly elevated lactate. RVP was negative. Admit for further management of acute hypoxic respiratory failure.     Pt seen at bedside. Had been c/o HA earlier, resolved w/ Remeron. On exam, resting comfortably.     #Acute hypoxic respiratory failure 2’ primary ciliary dyskinesia and PNA  #Sepsis 2’ suspected pseudomonas PNA  #Constipation  #Hyponatremia  #DM2  #HTN  #Severe protein calorie malnutrition     -Pulm recs appreciated.  -Cont IV Cefepime. Planning for azithro x3d.  -Na better. Cont current management.  -Bowel regimen.  -Tachycardia multifactorial – pain/anxiety/infection – improved – resume home BB.  -Resume home meds as tolerated.    Rest of plan as outlined above.

## 2023-03-31 NOTE — PROGRESS NOTE ADULT - ASSESSMENT
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

## 2023-03-31 NOTE — DIETITIAN INITIAL EVALUATION ADULT - NS FNS WEIGHT CHANGE REASON
Pt reported UBW-93lbs (4months ago), per HIE pt's weight 9/27-101lbs. Most recent adm weight 3/30-87.1lbs. Weight trend reflects weight loss of 5lbs/4.95% x 4 mos, and significant weight loss of 14lbs/13.86% x 6mos./unintentional

## 2023-03-31 NOTE — PROGRESS NOTE ADULT - ATTENDING COMMENTS
67 year old female with primary ciliary dyskinesia, bronchiectasis, and chronic hypoxemic and hypercapnic respiratory on AVAPS who presents with abdominal pain and increased sputum production on treatment for acute exacerbation of her bronchiectasis.   - Please check sputum culture - cup was given to patient  - Aggressive airway clearance, duonebs q6, hypersal, chest vest and Aerobika Q12 - please order  - Start Cefepime and will tailor abx based on culture results. Can hold colistin nebs while on IV abx.  - Continue AVAPS qHS.

## 2023-03-31 NOTE — DIETITIAN INITIAL EVALUATION ADULT - PERTINENT LABORATORY DATA
03-31    132<L>  |  91<L>  |  12  ----------------------------<  244<H>  4.4   |  35<H>  |  0.44<L>    Ca    9.0      31 Mar 2023 06:00  Phos  2.7     03-31  Mg     2.10     03-31    TPro  6.2  /  Alb  3.1<L>  /  TBili  0.2  /  DBili  x   /  AST  38<H>  /  ALT  60<H>  /  AlkPhos  218<H>  03-31  POCT Blood Glucose.: 270 mg/dL (03-31-23 @ 12:21)  A1C with Estimated Average Glucose Result: 8.8 % (03-30-23 @ 09:45)  A1C with Estimated Average Glucose Result: 9.4 % (09-29-22 @ 05:00)

## 2023-04-01 ENCOUNTER — TRANSCRIPTION ENCOUNTER (OUTPATIENT)
Age: 68
End: 2023-04-01

## 2023-04-01 LAB
-  AMIKACIN: SIGNIFICANT CHANGE UP
-  AZTREONAM: SIGNIFICANT CHANGE UP
-  CEFEPIME: SIGNIFICANT CHANGE UP
-  CEFTAZIDIME: SIGNIFICANT CHANGE UP
-  CIPROFLOXACIN: SIGNIFICANT CHANGE UP
-  GENTAMICIN: SIGNIFICANT CHANGE UP
-  LEVOFLOXACIN: SIGNIFICANT CHANGE UP
-  MEROPENEM: SIGNIFICANT CHANGE UP
-  PIPERACILLIN/TAZOBACTAM: SIGNIFICANT CHANGE UP
-  TOBRAMYCIN: SIGNIFICANT CHANGE UP
ALBUMIN SERPL ELPH-MCNC: 3 G/DL — LOW (ref 3.3–5)
ALP SERPL-CCNC: 191 U/L — HIGH (ref 40–120)
ALT FLD-CCNC: 60 U/L — HIGH (ref 4–33)
ANION GAP SERPL CALC-SCNC: 6 MMOL/L — LOW (ref 7–14)
AST SERPL-CCNC: 37 U/L — HIGH (ref 4–32)
BASE EXCESS BLDV CALC-SCNC: 12.9 MMOL/L — HIGH (ref -2–3)
BILIRUB SERPL-MCNC: 0.2 MG/DL — SIGNIFICANT CHANGE UP (ref 0.2–1.2)
BUN SERPL-MCNC: 13 MG/DL — SIGNIFICANT CHANGE UP (ref 7–23)
CALCIUM SERPL-MCNC: 8.8 MG/DL — SIGNIFICANT CHANGE UP (ref 8.4–10.5)
CHLORIDE SERPL-SCNC: 93 MMOL/L — LOW (ref 98–107)
CO2 BLDV-SCNC: 44.1 MMOL/L — HIGH (ref 22–26)
CO2 SERPL-SCNC: 35 MMOL/L — HIGH (ref 22–31)
CREAT SERPL-MCNC: 0.34 MG/DL — LOW (ref 0.5–1.3)
CULTURE RESULTS: SIGNIFICANT CHANGE UP
EGFR: 113 ML/MIN/1.73M2 — SIGNIFICANT CHANGE UP
GAS PNL BLDV: SIGNIFICANT CHANGE UP
GLUCOSE BLDC GLUCOMTR-MCNC: 104 MG/DL — HIGH (ref 70–99)
GLUCOSE BLDC GLUCOMTR-MCNC: 170 MG/DL — HIGH (ref 70–99)
GLUCOSE BLDC GLUCOMTR-MCNC: 200 MG/DL — HIGH (ref 70–99)
GLUCOSE BLDC GLUCOMTR-MCNC: 201 MG/DL — HIGH (ref 70–99)
GLUCOSE SERPL-MCNC: 193 MG/DL — HIGH (ref 70–99)
HCO3 BLDV-SCNC: 42 MMOL/L — HIGH (ref 22–29)
HCT VFR BLD CALC: 40.2 % — SIGNIFICANT CHANGE UP (ref 34.5–45)
HGB BLD-MCNC: 12.1 G/DL — SIGNIFICANT CHANGE UP (ref 11.5–15.5)
MAGNESIUM SERPL-MCNC: 2.3 MG/DL — SIGNIFICANT CHANGE UP (ref 1.6–2.6)
MCHC RBC-ENTMCNC: 26.3 PG — LOW (ref 27–34)
MCHC RBC-ENTMCNC: 30.1 GM/DL — LOW (ref 32–36)
MCV RBC AUTO: 87.4 FL — SIGNIFICANT CHANGE UP (ref 80–100)
METHOD TYPE: SIGNIFICANT CHANGE UP
NRBC # BLD: 0 /100 WBCS — SIGNIFICANT CHANGE UP (ref 0–0)
NRBC # FLD: 0 K/UL — SIGNIFICANT CHANGE UP (ref 0–0)
ORGANISM # SPEC MICROSCOPIC CNT: SIGNIFICANT CHANGE UP
ORGANISM # SPEC MICROSCOPIC CNT: SIGNIFICANT CHANGE UP
PCO2 BLDV: 74 MMHG — HIGH (ref 39–52)
PH BLDV: 7.36 — SIGNIFICANT CHANGE UP (ref 7.32–7.43)
PHOSPHATE SERPL-MCNC: 2.9 MG/DL — SIGNIFICANT CHANGE UP (ref 2.5–4.5)
PLATELET # BLD AUTO: 176 K/UL — SIGNIFICANT CHANGE UP (ref 150–400)
PO2 BLDV: 60 MMHG — HIGH (ref 25–45)
POTASSIUM SERPL-MCNC: 4.6 MMOL/L — SIGNIFICANT CHANGE UP (ref 3.5–5.3)
POTASSIUM SERPL-SCNC: 4.6 MMOL/L — SIGNIFICANT CHANGE UP (ref 3.5–5.3)
PROT SERPL-MCNC: 6.1 G/DL — SIGNIFICANT CHANGE UP (ref 6–8.3)
RBC # BLD: 4.6 M/UL — SIGNIFICANT CHANGE UP (ref 3.8–5.2)
RBC # FLD: 13.5 % — SIGNIFICANT CHANGE UP (ref 10.3–14.5)
SAO2 % BLDV: 92.1 % — HIGH (ref 67–88)
SODIUM SERPL-SCNC: 134 MMOL/L — LOW (ref 135–145)
SPECIMEN SOURCE: SIGNIFICANT CHANGE UP
WBC # BLD: 10.36 K/UL — SIGNIFICANT CHANGE UP (ref 3.8–10.5)
WBC # FLD AUTO: 10.36 K/UL — SIGNIFICANT CHANGE UP (ref 3.8–10.5)

## 2023-04-01 PROCEDURE — 99233 SBSQ HOSP IP/OBS HIGH 50: CPT | Mod: GC

## 2023-04-01 RX ORDER — SODIUM CHLORIDE 9 MG/ML
1000 INJECTION, SOLUTION INTRAVENOUS
Refills: 0 | Status: DISCONTINUED | OUTPATIENT
Start: 2023-04-01 | End: 2023-04-11

## 2023-04-01 RX ORDER — IPRATROPIUM/ALBUTEROL SULFATE 18-103MCG
3 AEROSOL WITH ADAPTER (GRAM) INHALATION EVERY 6 HOURS
Refills: 0 | Status: DISCONTINUED | OUTPATIENT
Start: 2023-04-01 | End: 2023-04-11

## 2023-04-01 RX ORDER — INSULIN LISPRO 100/ML
VIAL (ML) SUBCUTANEOUS
Refills: 0 | Status: DISCONTINUED | OUTPATIENT
Start: 2023-04-01 | End: 2023-04-11

## 2023-04-01 RX ORDER — TIOTROPIUM BROMIDE 18 UG/1
2 CAPSULE ORAL; RESPIRATORY (INHALATION) DAILY
Refills: 0 | Status: DISCONTINUED | OUTPATIENT
Start: 2023-04-01 | End: 2023-04-01

## 2023-04-01 RX ORDER — INSULIN LISPRO 100/ML
VIAL (ML) SUBCUTANEOUS AT BEDTIME
Refills: 0 | Status: DISCONTINUED | OUTPATIENT
Start: 2023-04-01 | End: 2023-04-11

## 2023-04-01 RX ORDER — DEXTROSE 50 % IN WATER 50 %
12.5 SYRINGE (ML) INTRAVENOUS ONCE
Refills: 0 | Status: DISCONTINUED | OUTPATIENT
Start: 2023-04-01 | End: 2023-04-11

## 2023-04-01 RX ORDER — MIRTAZAPINE 45 MG/1
7.5 TABLET, ORALLY DISINTEGRATING ORAL ONCE
Refills: 0 | Status: DISCONTINUED | OUTPATIENT
Start: 2023-04-01 | End: 2023-04-01

## 2023-04-01 RX ORDER — INSULIN GLARGINE 100 [IU]/ML
8 INJECTION, SOLUTION SUBCUTANEOUS AT BEDTIME
Refills: 0 | Status: DISCONTINUED | OUTPATIENT
Start: 2023-04-01 | End: 2023-04-05

## 2023-04-01 RX ORDER — IPRATROPIUM/ALBUTEROL SULFATE 18-103MCG
3 AEROSOL WITH ADAPTER (GRAM) INHALATION ONCE
Refills: 0 | Status: COMPLETED | OUTPATIENT
Start: 2023-04-01 | End: 2023-04-01

## 2023-04-01 RX ORDER — DEXTROSE 50 % IN WATER 50 %
25 SYRINGE (ML) INTRAVENOUS ONCE
Refills: 0 | Status: DISCONTINUED | OUTPATIENT
Start: 2023-04-01 | End: 2023-04-11

## 2023-04-01 RX ORDER — DEXTROSE 50 % IN WATER 50 %
15 SYRINGE (ML) INTRAVENOUS ONCE
Refills: 0 | Status: DISCONTINUED | OUTPATIENT
Start: 2023-04-01 | End: 2023-04-11

## 2023-04-01 RX ORDER — GLUCAGON INJECTION, SOLUTION 0.5 MG/.1ML
1 INJECTION, SOLUTION SUBCUTANEOUS ONCE
Refills: 0 | Status: DISCONTINUED | OUTPATIENT
Start: 2023-04-01 | End: 2023-04-11

## 2023-04-01 RX ORDER — INSULIN LISPRO 100/ML
7 VIAL (ML) SUBCUTANEOUS
Refills: 0 | Status: DISCONTINUED | OUTPATIENT
Start: 2023-04-01 | End: 2023-04-05

## 2023-04-01 RX ADMIN — TIOTROPIUM BROMIDE 2 PUFF(S): 18 CAPSULE ORAL; RESPIRATORY (INHALATION) at 12:37

## 2023-04-01 RX ADMIN — Medication 30 MILLILITER(S): at 18:08

## 2023-04-01 RX ADMIN — KETOTIFEN FUMARATE 1 DROP(S): 0.34 SOLUTION OPHTHALMIC at 05:38

## 2023-04-01 RX ADMIN — Medication 1 SPRAY(S): at 18:00

## 2023-04-01 RX ADMIN — Medication 3 MILLIGRAM(S): at 21:05

## 2023-04-01 RX ADMIN — Medication 2: at 08:44

## 2023-04-01 RX ADMIN — KETOTIFEN FUMARATE 1 DROP(S): 0.34 SOLUTION OPHTHALMIC at 21:04

## 2023-04-01 RX ADMIN — BUDESONIDE AND FORMOTEROL FUMARATE DIHYDRATE 2 PUFF(S): 160; 4.5 AEROSOL RESPIRATORY (INHALATION) at 21:04

## 2023-04-01 RX ADMIN — CEFEPIME 100 MILLIGRAM(S): 1 INJECTION, POWDER, FOR SOLUTION INTRAMUSCULAR; INTRAVENOUS at 05:36

## 2023-04-01 RX ADMIN — Medication 3 MILLILITER(S): at 16:06

## 2023-04-01 RX ADMIN — Medication 1: at 12:36

## 2023-04-01 RX ADMIN — Medication 1: at 17:59

## 2023-04-01 RX ADMIN — MONTELUKAST 10 MILLIGRAM(S): 4 TABLET, CHEWABLE ORAL at 12:38

## 2023-04-01 RX ADMIN — PANTOPRAZOLE SODIUM 40 MILLIGRAM(S): 20 TABLET, DELAYED RELEASE ORAL at 05:38

## 2023-04-01 RX ADMIN — Medication 7 UNIT(S): at 12:37

## 2023-04-01 RX ADMIN — Medication 50 MILLIGRAM(S): at 05:38

## 2023-04-01 RX ADMIN — Medication 7 UNIT(S): at 17:59

## 2023-04-01 RX ADMIN — SENNA PLUS 2 TABLET(S): 8.6 TABLET ORAL at 21:05

## 2023-04-01 RX ADMIN — ENOXAPARIN SODIUM 30 MILLIGRAM(S): 100 INJECTION SUBCUTANEOUS at 21:04

## 2023-04-01 RX ADMIN — MIRTAZAPINE 7.5 MILLIGRAM(S): 45 TABLET, ORALLY DISINTEGRATING ORAL at 21:06

## 2023-04-01 RX ADMIN — INSULIN GLARGINE 8 UNIT(S): 100 INJECTION, SOLUTION SUBCUTANEOUS at 22:12

## 2023-04-01 RX ADMIN — AMLODIPINE BESYLATE 5 MILLIGRAM(S): 2.5 TABLET ORAL at 05:37

## 2023-04-01 RX ADMIN — POLYETHYLENE GLYCOL 3350 17 GRAM(S): 17 POWDER, FOR SOLUTION ORAL at 05:38

## 2023-04-01 RX ADMIN — POLYETHYLENE GLYCOL 3350 17 GRAM(S): 17 POWDER, FOR SOLUTION ORAL at 18:01

## 2023-04-01 RX ADMIN — Medication 500000 UNIT(S): at 18:02

## 2023-04-01 RX ADMIN — SODIUM CHLORIDE 4 MILLILITER(S): 9 INJECTION INTRAMUSCULAR; INTRAVENOUS; SUBCUTANEOUS at 21:19

## 2023-04-01 RX ADMIN — Medication 5 UNIT(S): at 08:45

## 2023-04-01 RX ADMIN — BUDESONIDE AND FORMOTEROL FUMARATE DIHYDRATE 2 PUFF(S): 160; 4.5 AEROSOL RESPIRATORY (INHALATION) at 09:12

## 2023-04-01 RX ADMIN — CHLORHEXIDINE GLUCONATE 1 APPLICATION(S): 213 SOLUTION TOPICAL at 21:12

## 2023-04-01 RX ADMIN — Medication 1 SPRAY(S): at 05:37

## 2023-04-01 RX ADMIN — Medication 500000 UNIT(S): at 12:38

## 2023-04-01 RX ADMIN — Medication 3 MILLILITER(S): at 21:19

## 2023-04-01 RX ADMIN — CEFEPIME 100 MILLIGRAM(S): 1 INJECTION, POWDER, FOR SOLUTION INTRAMUSCULAR; INTRAVENOUS at 18:09

## 2023-04-01 RX ADMIN — Medication 500000 UNIT(S): at 05:37

## 2023-04-01 NOTE — PROGRESS NOTE ADULT - PROBLEM SELECTOR PLAN 1
- pt met sepsis criteria due to elevated WBC, increased WOB (RR), and elevated lactate  - s/p ceftriaxone in the ED  - outpt record showed colonization of sputum w/ Pseudomonas and Klebsiella  - on zithromax, pending Pulm recs   - urine legionalla negative   - Bcx NGTD, Scx NGTD   -

## 2023-04-01 NOTE — DISCHARGE NOTE PROVIDER - PROVIDER TOKENS
PROVIDER:[TOKEN:[[id number]],FOLLOWUP:[2 weeks],ESTABLISHEDPATIENT:[T]],PROVIDER:[TOKEN:[[id number]],FOLLOWUP:[2 weeks],ESTABLISHEDPATIENT:[T]]

## 2023-04-01 NOTE — DISCHARGE NOTE PROVIDER - CARE PROVIDER_API CALL
Shelli Young)  Critical Care Medicine; Pulmonary Disease  [address], [address]  [place] [address]  Phone: [phone]  Fax: [phone]  Established Patient  Follow Up Time: 2 weeks    Rancho Chaidez)  Gastroenterology; Internal Medicine  [address]  [place] [address]  Phone: [phone]  Fax: [phone]  Established Patient  Follow Up Time: 2 weeks

## 2023-04-01 NOTE — PROGRESS NOTE ADULT - PROBLEM SELECTOR PLAN 6
- will hold lasix iso hypo-Na  - c/w amlodipine 5 w/ holding parameters  - started on metoprolol succinate 50 mg qd home dose

## 2023-04-01 NOTE — PROGRESS NOTE ADULT - ASSESSMENT
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

## 2023-04-01 NOTE — PROGRESS NOTE ADULT - ATTENDING COMMENTS
VBG w resp acidosis this AM and clinically more drowsy - pulm placed back on AVAPS  repeat blood gas, monitor MS closely  AVAPS setting per pulm  cw cefepime, nebs, chest PT  sputum cx neg and leukocytosis resolving   hyponatremia resolving   fs uncontrolled - inc basal/bolus as above

## 2023-04-01 NOTE — DISCHARGE NOTE PROVIDER - NSFOLLOWUPCLINICS_GEN_ALL_ED_FT
[facility]  Gastroenterology  [address] [place] [address]  Phone: [phone]  Fax:   Follow Up Time: 2 weeks

## 2023-04-01 NOTE — DISCHARGE NOTE PROVIDER - NSDCFUSCHEDAPPT_GEN_ALL_CORE_FT
Shelli Young  [facility]  [facility] [address] [place] [address]  Scheduled Appointment: 04/04/2023

## 2023-04-01 NOTE — PROGRESS NOTE ADULT - ASSESSMENT
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 and night time AVAPS w/ frequent episodes of admission due to bronchiectasis, HTN, chronic constipation, presents with bronchiectasis exacerbation  - Tailor abx to prior cx for now, continue cefepime  - normal sputum   - Airway clearance with duonebs q6h, Hypersal, Aerobika BID, Chest vest BID  - Symbicort 160 BID   - STOP spiriva while on standing duonebs, can resume when nebs are made prn  - c/w home Singulair  -- supplemental O2 and AVAPS QHS  - continue AVAPS when drowsy and repeat gas later today if mentation does not improve (although nursing staff confirms that patient was on AVAPs overnight, patient had machine off early this morning and was without RT documentation so unclear how long it was on for.   - Please obtain nutrition consult

## 2023-04-01 NOTE — PROGRESS NOTE ADULT - PROBLEM SELECTOR PLAN 4
- Na 117 at admission, (123 w/ correction)  baseline 135 outpatient, 132 w/ correction this AM   - ddx include Legionaire disease considering respiratory distress vs poor PO intake vs SIADH  - s/p 1L of NS in the ED   - fluid restriction 1.2L  - hyponatremia workup consistent w/ SIADH however patient appears volume down

## 2023-04-01 NOTE — PROGRESS NOTE ADULT - SUBJECTIVE AND OBJECTIVE BOX
CHIEF COMPLAINT:Patient is a 68y old  Female who presents with a chief complaint of SOB (01 Apr 2023 05:26)      Interval Events: more lethargic this AM    REVIEW OF SYSTEMS:[] All other systems negative except per HPI   [x] Unable to assess ROS because lethargy    OBJECTIVE:  ICU Vital Signs Last 24 Hrs  T(C): 36.7 (01 Apr 2023 05:30), Max: 37.1 (31 Mar 2023 17:28)  T(F): 98 (01 Apr 2023 05:30), Max: 98.8 (31 Mar 2023 17:28)  HR: 80 (01 Apr 2023 05:30) (78 - 138)  BP: 142/54 (01 Apr 2023 05:30) (117/53 - 169/83)  BP(mean): --  ABP: --  ABP(mean): --  RR: 18 (01 Apr 2023 05:30) (18 - 20)  SpO2: 100% (01 Apr 2023 05:30) (94% - 100%)    O2 Parameters below as of 01 Apr 2023 05:30  Patient On (Oxygen Delivery Method): nasal cannula  O2 Flow (L/min): 3        Mode: standby      PHYSICAL EXAM:  GENERAL: NAD, well-groomed, well-developed  HEAD:  Atraumatic, Normocephalic  EYES: EOMI, PERRLA, conjunctiva and sclera clear  ENMT: No tonsillar erythema, exudates, or enlargement; Moist mucous membranes, Good dentition, No lesions  NECK: Supple, No JVD, Normal thyroid  CHEST/LUNG: rhonchi and wheeze   HEART: Regular rate and rhythm; No murmurs, rubs, or gallops  ABDOMEN: Soft, Nontender, Nondistended; Bowel sounds present  VASCULAR:  2+ Peripheral Pulses, No clubbing, cyanosis, or edema  LYMPH: No lymphadenopathy noted  SKIN: No rashes or lesions  NERVOUS SYSTEM:  Alert & Oriented X3, Good concentration; Motor Strength 5/5 B/L upper and lower extremities; DTRs 2+ intact and symmetric    HOSPITAL MEDICATIONS:  MEDICATIONS  (STANDING):  acetylcysteine 10%  Inhalation 4 milliLiter(s) Inhalation two times a day  amLODIPine   Tablet 5 milliGRAM(s) Oral daily  budesonide 160 MICROgram(s)/formoterol 4.5 MICROgram(s) Inhaler 2 Puff(s) Inhalation two times a day  cefepime   IVPB 2000 milliGRAM(s) IV Intermittent every 12 hours  cefepime   IVPB      chlorhexidine 2% Cloths 1 Application(s) Topical daily  dextrose 5%. 1000 milliLiter(s) (50 mL/Hr) IV Continuous <Continuous>  dextrose 5%. 1000 milliLiter(s) (100 mL/Hr) IV Continuous <Continuous>  dextrose 50% Injectable 25 Gram(s) IV Push once  dextrose 50% Injectable 12.5 Gram(s) IV Push once  dextrose 50% Injectable 25 Gram(s) IV Push once  enoxaparin Injectable 30 milliGRAM(s) SubCutaneous every 24 hours  fluticasone propionate 50 MICROgram(s)/spray Nasal Spray 1 Spray(s) Both Nostrils two times a day  glucagon  Injectable 1 milliGRAM(s) IntraMuscular once  influenza  Vaccine (HIGH DOSE) 0.7 milliLiter(s) IntraMuscular once  insulin glargine Injectable (LANTUS) 8 Unit(s) SubCutaneous at bedtime  insulin lispro (ADMELOG) corrective regimen sliding scale   SubCutaneous three times a day before meals  insulin lispro (ADMELOG) corrective regimen sliding scale   SubCutaneous at bedtime  insulin lispro Injectable (ADMELOG) 7 Unit(s) SubCutaneous three times a day before meals  ketotifen 0.025% Ophthalmic Solution 1 Drop(s) Both EYES two times a day  levalbuterol Inhalation 1.25 milliGRAM(s) Inhalation every 4 hours  metoprolol succinate ER 50 milliGRAM(s) Oral daily  mirtazapine 7.5 milliGRAM(s) Oral at bedtime  montelukast 10 milliGRAM(s) Oral daily  nystatin    Suspension 447000 Unit(s) Oral four times a day  pantoprazole    Tablet 40 milliGRAM(s) Oral before breakfast  polyethylene glycol 3350 17 Gram(s) Oral two times a day  senna 2 Tablet(s) Oral at bedtime  sodium chloride 3%  Inhalation 4 milliLiter(s) Inhalation every 12 hours  sorbitol 70%/mineral oil/magnesium hydroxide/glycerin Enema 120 milliLiter(s) Rectal once  tiotropium 2.5 MICROgram(s) Inhaler 2 Puff(s) Inhalation daily    MEDICATIONS  (PRN):  acetaminophen     Tablet .. 650 milliGRAM(s) Oral every 6 hours PRN Temp greater or equal to 38C (100.4F), Mild Pain (1 - 3)  aluminum hydroxide/magnesium hydroxide/simethicone Suspension 30 milliLiter(s) Oral every 4 hours PRN Dyspepsia  bisacodyl Suppository 10 milliGRAM(s) Rectal daily PRN Constipation  dextrose Oral Gel 15 Gram(s) Oral once PRN Blood Glucose LESS THAN 70 milliGRAM(s)/deciliter  guaifenesin/dextromethorphan Oral Liquid 10 milliLiter(s) Oral every 8 hours PRN Cough  melatonin 3 milliGRAM(s) Oral at bedtime PRN Insomnia  ondansetron Injectable 4 milliGRAM(s) IV Push every 8 hours PRN Nausea and/or Vomiting      LABS:    The Labs were reviewed by me   The Radiology was reviewed by me    EKG tracing reviewed by me    04-01    134<L>  |  93<L>  |  13  ----------------------------<  193<H>  4.6   |  35<H>  |  0.34<L>  03-31    132<L>  |  93<L>  |  13  ----------------------------<  335<H>  5.0   |  37<H>  |  0.39<L>  03-31    132<L>  |  91<L>  |  12  ----------------------------<  244<H>  4.4   |  35<H>  |  0.44<L>    Ca    8.8      01 Apr 2023 05:20  Ca    8.9      31 Mar 2023 17:30  Ca    9.0      31 Mar 2023 06:00  Phos  2.9     04-01  Mg     2.30     04-01    TPro  6.1  /  Alb  3.0<L>  /  TBili  0.2  /  DBili  x   /  AST  37<H>  /  ALT  60<H>  /  AlkPhos  191<H>  04-01  TPro  6.2  /  Alb  3.1<L>  /  TBili  0.2  /  DBili  x   /  AST  38<H>  /  ALT  60<H>  /  AlkPhos  218<H>  03-31  TPro  6.5  /  Alb  3.4  /  TBili  0.3  /  DBili  x   /  AST  42<H>  /  ALT  65<H>  /  AlkPhos  218<H>  03-30    Magnesium, Serum: 2.30 mg/dL (04-01-23 @ 05:20)  Magnesium, Serum: 2.30 mg/dL (03-31-23 @ 17:30)  Magnesium, Serum: 2.10 mg/dL (03-31-23 @ 06:00)  Magnesium, Serum: 2.10 mg/dL (03-30-23 @ 23:00)  Magnesium, Serum: 2.20 mg/dL (03-30-23 @ 09:45)    Phosphorus Level, Serum: 2.9 mg/dL (04-01-23 @ 05:20)  Phosphorus Level, Serum: 3.4 mg/dL (03-31-23 @ 17:30)  Phosphorus Level, Serum: 2.7 mg/dL (03-31-23 @ 06:00)  Phosphorus Level, Serum: 2.6 mg/dL (03-30-23 @ 23:00)  Phosphorus Level, Serum: 2.5 mg/dL (03-30-23 @ 09:45)                                              12.1   10.36 )-----------( 176      ( 01 Apr 2023 05:20 )             40.2                         13.3   13.96 )-----------( 191      ( 31 Mar 2023 06:00 )             41.2                         12.6   15.28 )-----------( 192      ( 30 Mar 2023 09:45 )             39.3     CAPILLARY BLOOD GLUCOSE      POCT Blood Glucose.: 201 mg/dL (01 Apr 2023 08:23)  POCT Blood Glucose.: 248 mg/dL (31 Mar 2023 22:08)  POCT Blood Glucose.: 300 mg/dL (31 Mar 2023 17:15)  POCT Blood Glucose.: 270 mg/dL (31 Mar 2023 12:21)        MICROBIOLOGY:     RADIOLOGY:  [ ] Reviewed and interpreted by me    Point of Care Ultrasound Findings:    PFT:    EKG:

## 2023-04-01 NOTE — DISCHARGE NOTE PROVIDER - NSDCFUADDAPPT_GEN_ALL_CORE_FT
APPTS ARE READY TO BE MADE: [ ] YES    Best Family or Patient Contact (if needed):    Additional Information about above appointments (if needed):    1: Pulmonology (primary ciliary disorder and COPD)  2: Gastroenterology (constipation)  3:     Other comments or requests:    APPTS ARE READY TO BE MADE: [ ] YES    Best Family or Patient Contact (if needed):    Additional Information about above appointments (if needed):    1: Pulmonology (primary ciliary disorder and COPD)  2: Gastroenterology (constipation)  3: PCP (management of constipation, mucous, COPD, and diabetes)  4: Endocrinology (labile sugars after prednisone use)    Other comments or requests:

## 2023-04-01 NOTE — PROGRESS NOTE ADULT - ATTENDING COMMENTS
67 year old female with primary ciliary dyskinesia, bronchiectasis, and chronic hypoxemic and hypercapnic respiratory on AVAPS who presents with abdominal pain and increased sputum production on treatment for acute exacerbation of her bronchiectasis.   - Somnolent on exam this AM, placed back on AVAPS by our team. Please ensure she wears at night - no documentation of NIV being on overnight.  - Aggressive airway clearance, duonebs q6, hypersal, chest vest and Aerobika Q12   - Continue Cefepime

## 2023-04-01 NOTE — DISCHARGE NOTE PROVIDER - HOSPITAL COURSE
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 and night time AVAPS w/ frequent episodes of admission due to bronchiectasis, HTN, chronic constipation, presents with bronchiectasis exacerbation. Pulmonology consulted patient started on IV cefepime, duonebs ATC, pulmonary toileting regimen. Patient found to be hyponatremic to SNa of 123 w/ correction, hyponatremia workup indicative of SIADH, patient was subsequently fluid-restricted (1.2L/day) with subsequent resolution of SNa over 48 hours. Patient constipated on admission which resolved s/p bowel reigmen. CT chest performed during hospital course notable for 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 and night time AVAPS w/ frequent episodes of admission due to bronchiectasis, HTN, chronic constipation, presents with bronchiectasis exacerbation. Pulmonology consulted patient started on IV cefepime, duonebs ATC, pulmonary toileting regimen. Patient found to be hyponatremic to SNa of 123 w/ correction, hyponatremia workup indicative of SIADH, patient was subsequently fluid-restricted (1.2L/day) with subsequent resolution of SNa over 48 hours. Patient constipated on admission which resolved s/p bowel regimen. CT chest performed during hospital course notable for New clustered nodules/tree-in-bud opacities in the right upper and left lower lobes suggesting infectious etiology with endobronchial spread 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 and night time AVAPS w/ frequent episodes of admission due to bronchiectasis, HTN, chronic constipation, presents with bronchiectasis exacerbation. Pulmonology consulted patient started on IV cefepime, duonebs ATC, pulmonary toileting regimen. Patient found to be hyponatremic to SNa of 123 w/ correction, hyponatremia workup indicative of SIADH, patient was subsequently fluid-restricted (1.2L/day) with subsequent resolution of SNa over 48 hours. Patient constipated on admission which resolved s/p bowel regimen. CT chest performed during hospital course notable for New clustered nodules/tree-in-bud opacities in the right upper and left lower lobes suggesting infectious etiology with endobronchial spread.  Continued on cefepime for 7-day course.  Also underwent significant pulmonary toilet, including chest PT, chest vest, hypersal, mucomyst, aerobika, mucinex, symbicort, duonebs.  Patient required AVAPS at night due to CO2 retention.  Course c/b constipation, treated wtih miralax and senna.  Also c/b urinary retention, treated with Flomax and removal of anticholinergics.    On discharge, patient with AVAPS machine at home and Flomax prescriptions.  Found medically stable for discharge home.    Patient to follow up with PCP and pulmonologist. 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 and night time AVAPS w/ frequent episodes of admission due to bronchiectasis, HTN, chronic constipation, presents with bronchiectasis exacerbation. Pulmonology consulted patient started on IV cefepime, duonebs ATC, pulmonary toileting regimen. Patient found to be hyponatremic to SNa of 123 w/ correction, hyponatremia workup indicative of SIADH, patient was subsequently fluid-restricted (1.2L/day) with subsequent resolution of SNa over 48 hours. Patient constipated on admission which resolved s/p bowel regimen. CT chest performed during hospital course notable for New clustered nodules/tree-in-bud opacities in the right upper and left lower lobes suggesting infectious etiology with endobronchial spread.  Continued on cefepime for 7-day course.  Also underwent significant pulmonary toilet, including chest PT, chest vest, hypersal, mucomyst, aerobika, mucinex, symbicort, duonebs.  Pt s/p 5-day course of prednisone 40, c/b hyperglycemia requiring increase in insulin.  Course c/b hypoglycemia after prednisone course finished, and pt was discharged on reduced dose of insulin (6u lantus, 2u premeal).  Patient required AVAPS at night due to CO2 retention.  Course c/b constipation, treated wtih miralax and senna.  Also c/b urinary retention, treated with Flomax and removal of anticholinergics.      On discharge, patient with AVAPS machine at home and azithromycin and lactobacillus prescriptions.  Patient instructed to take 500mg azithromycin three times a week on Sunday, Tuesday, and Friday.  Found medically stable for discharge home.    Patient to follow up with PCP and pulmonologist. 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 and night time AVAPS w/ frequent episodes of admission due to bronchiectasis, HTN, chronic constipation, presents with bronchiectasis exacerbation. Pulmonology consulted patient started on IV cefepime, duonebs ATC, pulmonary toileting regimen. Patient found to be hyponatremic to SNa of 123 w/ correction, hyponatremia workup indicative of SIADH, patient was subsequently fluid-restricted (1.2L/day) with subsequent resolution of SNa over 48 hours. Patient constipated on admission which resolved s/p bowel regimen. CT chest performed during hospital course notable for New clustered nodules/tree-in-bud opacities in the right upper and left lower lobes suggesting infectious etiology with endobronchial spread.  Continued on cefepime for 7-day course.  Also underwent significant pulmonary toilet, including chest PT, chest vest, hypersal, mucomyst, aerobika, mucinex, symbicort, duonebs.  Pt s/p 5-day course of prednisone 40, c/b hyperglycemia requiring increase in insulin.  Course c/b hypoglycemia after prednisone course finished, and pt was discharged on reduced dose of insulin (6u lantus, 2u premeal).  Patient required AVAPS at night due to CO2 retention.  Course c/b constipation, treated with miralax and senna.  Also c/b urinary retention, treated with Flomax and removal of anticholinergics.      On discharge, patient with AVAPS machine at home and azithromycin and lactobacillus prescriptions.  Patient instructed to take 500mg azithromycin three times a week on Sunday, Tuesday, and Friday.  Found medically stable for discharge home.    Patient to follow up with PCP and pulmonologist.

## 2023-04-01 NOTE — DISCHARGE NOTE PROVIDER - DETAILS OF MALNUTRITION DIAGNOSIS/DIAGNOSES
This patient has been assessed with a concern for Malnutrition and was treated during this hospitalization for the following Nutrition diagnosis/diagnoses:     -  03/31/2023: Severe protein-calorie malnutrition   -  03/31/2023: Underweight (BMI < 19)

## 2023-04-01 NOTE — DISCHARGE NOTE PROVIDER - NSDCCPCAREPLAN_GEN_ALL_CORE_FT
PRINCIPAL DISCHARGE DIAGNOSIS  Diagnosis: Acute exacerbation of bronchiectasis  Assessment and Plan of Treatment:       SECONDARY DISCHARGE DIAGNOSES  Diagnosis: Primary ciliary dyskinesia  Assessment and Plan of Treatment:     Diagnosis: Hyponatremia  Assessment and Plan of Treatment:      PRINCIPAL DISCHARGE DIAGNOSIS  Diagnosis: Acute exacerbation of bronchiectasis  Assessment and Plan of Treatment: You were seen by the Lung Doctor while you were in the hospital and treated with an IV antibiotic called cefepime.   What are the Symptoms of Bronchiectasis?  The most common symptom of bronchiectasis  is cough, which is usually productive of sputum  (phlegm). The cough may become worse at times,  and a person may also have fever, chills, night  sweats, tiredness, and a change in the color and  amount of sputum. When this happens it is called  an exacerbation (or flare-up) of bronchiectasis.  Other symptoms can include:  -  Shortness of breath or air hunger  -  Unintended weight loss  -  Coughing up blood (hemoptysis)  -  Chest pain or tightness        SECONDARY DISCHARGE DIAGNOSES  Diagnosis: Primary ciliary dyskinesia  Assessment and Plan of Treatment: Primary ciliary dyskinesia (PCD) is a rare inherited disorder caused by defects in the structure and/or function of cilia. Cilia are tiny hair-like structures, which are required to move fluids and particles in various parts of the body, including the airways.  If there are defects in the cilia lining the airways, the body is unable to expel foreign material and clear mucus. This can lead to pulmonary complications, including frequent infections of the lungs, ears, throat and sinuses.  People with PCD may have persistent or recurrent infections, which can significantly disrupt their quality of life and sometimes lead to permanent damage and life-threatening complications.    Diagnosis: Hyponatremia  Assessment and Plan of Treatment:      PRINCIPAL DISCHARGE DIAGNOSIS  Diagnosis: Acute exacerbation of bronchiectasis  Assessment and Plan of Treatment: You were seen by the Lung Doctor while you were in the hospital and treated with an IV antibiotic called cefepime.   What are the Symptoms of Bronchiectasis?  The most common symptom of bronchiectasis  is cough, which is usually productive of sputum  (phlegm). The cough may become worse at times,  and a person may also have fever, chills, night  sweats, tiredness, and a change in the color and  amount of sputum. When this happens it is called  an exacerbation (or flare-up) of bronchiectasis.  Other symptoms can include:  -  Shortness of breath or air hunger  -  Unintended weight loss  -  Coughing up blood (hemoptysis)  -  Chest pain or tightness        SECONDARY DISCHARGE DIAGNOSES  Diagnosis: Primary ciliary dyskinesia  Assessment and Plan of Treatment: Primary ciliary dyskinesia (PCD) is a rare inherited disorder caused by defects in the structure and/or function of cilia. Cilia are tiny hair-like structures, which are required to move fluids and particles in various parts of the body, including the airways.  If there are defects in the cilia lining the airways, the body is unable to expel foreign material and clear mucus. This can lead to pulmonary complications, including frequent infections of the lungs, ears, throat and sinuses.  People with PCD may have persistent or recurrent infections, which can significantly disrupt their quality of life and sometimes lead to permanent damage and life-threatening complications.     PRINCIPAL DISCHARGE DIAGNOSIS  Diagnosis: Acute exacerbation of bronchiectasis  Assessment and Plan of Treatment: You were seen by the Lung Doctor while you were in the hospital and treated with an IV antibiotic called cefepime for 7 days.  Afterwards, you were treated with prednisone for several days.  You were finally placed on a drug called azithromycin for that you are to take 3 times a week on Sunday, Tuesday, and Friday.  Please STOP taking Spiriva  Please START taking azithromycin, Flomax, Mucomyst, and Mucinex.  Please follow up with your PCP and pulmonologist within 2 weeks.  What are the Symptoms of Bronchiectasis?  The most common symptom of bronchiectasis  is cough, which is usually productive of sputum  (phlegm). The cough may become worse at times,  and a person may also have fever, chills, night  sweats, tiredness, and a change in the color and  amount of sputum. When this happens it is called  an exacerbation (or flare-up) of bronchiectasis.  Other symptoms can include:  -  Shortness of breath or air hunger  -  Unintended weight loss  -  Coughing up blood (hemoptysis)  -  Chest pain or tightness        SECONDARY DISCHARGE DIAGNOSES  Diagnosis: Primary ciliary dyskinesia  Assessment and Plan of Treatment: Primary ciliary dyskinesia (PCD) is a rare inherited disorder caused by defects in the structure and/or function of cilia. Cilia are tiny hair-like structures, which are required to move fluids and particles in various parts of the body, including the airways.  If there are defects in the cilia lining the airways, the body is unable to expel foreign material and clear mucus. This can lead to pulmonary complications, including frequent infections of the lungs, ears, throat and sinuses.  People with PCD may have persistent or recurrent infections, which can significantly disrupt their quality of life and sometimes lead to permanent damage and life-threatening complications.      Diagnosis: Hypoglycemia  Assessment and Plan of Treatment: Your sugar was low while you were in the hospital.  As a result, your insulin was changed to avoid hypoglycemic episodes.  Please follow up with your endocrinologist and PCP within 1 week.  Please decrease lantus to 6u at bedtime.  Please decrease Admelog to 2u before each meal.

## 2023-04-01 NOTE — PROGRESS NOTE ADULT - PROBLEM SELECTOR PLAN 2
- Hx of PCD, difficulty clearing mucus per pt  - aggressive pulm toileting, including chest PT, mucomyst, hypersal, chest vest q12h, aerobika q12h   - c/w symbicort, singular  - now on xopenex 2/2 sinus tach   - pulm consulted, follow recs   - CT Chest: New clustered nodules/tree-in-bud opacities in the right upper and left lower lobes suggesting infectious etiology with endobronchial spread.

## 2023-04-01 NOTE — DISCHARGE NOTE PROVIDER - NSDCCPTREATMENT_GEN_ALL_CORE_FT
PRINCIPAL PROCEDURE  Procedure: CT chest wo con  Findings and Treatment: FINDINGS:  LUNGS AND AIRWAYS: Patent central airways. Mosaic attenuation due to   areas of pulmonary air entrapment sequela of distal airway disease.   Diffuse bilateral bronchiectasis and bronchial wall thickening with   scattered areas of mucus plugging majority of which demonstrate no   significant interval change since September 20, 2022. Biapical scarring,   unchanged. New clustered nodules/tree-in-bud opacities and groundglass   opacities in the right upper and left lower lobes. Additional scattered   bilateral nodular opacities are unchanged.  PLEURA: No pleural effusion.  MEDIASTINUM AND STEPHANIE: No lymphadenopathy.  VESSELS: Minimal coronary artery calcification.  HEART: Heart size is normal. No pericardial effusion.  CHEST WALL AND LOWER NECK: Heterogenous thyroid.  VISUALIZED UPPER ABDOMEN: Nodular contour of the liver suggestive of   cirrhosis. Redemonstrated pneumobilia similar to prior exam. Please refer   to CT abdomen pelvis from 3/29/2023 for additional findings.  BONES: Degenerative changes.  IMPRESSION:  New clustered nodules/tree-in-bud opacities in the right upper and left   lower lobes suggesting infectious etiology with endobronchial spread.  Chronic bilateral bronchiectasis bronchial wall thickening as on the   prior study with associated impacted distal or dilated airways.  --- End of Report ---

## 2023-04-01 NOTE — DISCHARGE NOTE PROVIDER - ATTENDING DISCHARGE PHYSICAL EXAMINATION:
.  VITAL SIGNS:  T(C): 36.6 (04-11-23 @ 15:30), Max: 36.7 (04-10-23 @ 21:50)  T(F): 97.9 (04-11-23 @ 15:30), Max: 98.1 (04-10-23 @ 21:50)  HR: 83 (04-11-23 @ 15:30) (68 - 91)  BP: 134/55 (04-11-23 @ 15:30) (119/64 - 134/55)  BP(mean): --  RR: 18 (04-11-23 @ 15:30) (17 - 20)  SpO2: 97% (04-11-23 @ 15:30) (96% - 100%)  Wt(kg): --    PHYSICAL EXAM:  GENERAL: NAD, lying in bed comfortable, appears older than chronological age, Nasal cannula in place with no respiratory distress.   HEAD:  Atraumatic, normocephalic  EYES: EOMI, PERRLA, conjunctiva and sclera clear  ENT: dry mucous membranes, white film appreciated on tongue  NECK: Supple, no JVD  HEART: Tachycardic rate, regular rhythm, no murmurs, rubs, or gallops  LUNGS: b/l wheezes heard  ABDOMEN: Soft, nontender, nondistended, +BS  EXTREMITIES: 2+ peripheral pulses bilaterally. No clubbing, cyanosis, or edema  NERVOUS SYSTEM:  A&Ox3, no focal deficits   SKIN: +skin tenting, No rashes or lesions

## 2023-04-01 NOTE — DISCHARGE NOTE PROVIDER - NSDCMRMEDTOKEN_GEN_ALL_CORE_FT
Admelog SoloStar 100 units/mL injectable solution: 7 unit(s) injectable 3 times a day (before meals) (depending on reading).  ALBUTEROL SULFATE (2.5 MG/3ML)0.083% NEBULIZER: USE 1 UNIT DOSE EVERY 4-6 HOURS AS NEEDED FOR WHEEZING .  AMLODIPINE BESYLATE 5MG TABLET: TAKE 1 TABLET (5 MG) BY MOUTH DAILY FOR HIGH BLOOD PRESSURE  benzonatate 100 mg oral capsule: 1 orally 3 times a day  bisacodyl 5 mg oral delayed release tablet: 1 tab(s) orally once daily as needed  Calcium 600+D 600 mg-5 mcg (200 intl units) oral tablet: 1 tab(s) orally 2 times a day  CYANOCOBALAMIN 1000MCG/ML SOLUTION: ADMINISTER 1 MILLILITER (1,000 MCG) SUBCUTANEOUS EVERY 7 DAYS  dextromethorphan-guaifenesin 30 mg-600 mg oral tablet, extended release: 1 orally once a day  FERROUS GLUCONATE 324 (38 FE)MG TABLET: TAKE 1 TABLET BY MOUTH 2 TIMES PER DAY BETWEEN MEALS IN WATER OR JUICE  Flonase 50 mcg/inh nasal spray: 1 spray(s) in each nostril once a day as needed  furosemide 40 mg oral tablet: 1 orally once a day  GNP GAS RELIEF 80 MG ORAL TABLET CHEWABLE: TAKE 1 TABLET BY MOUTH 4 TIMES PER DAY AFTER MEALS AND AT BEDTIME AS NEEDED FOR GAS  insulin glargine 100 units/mL subcutaneous solution: 8 unit(s) subcutaneous once a day (at bedtime)  ketotifen 0.025% ophthalmic solution: 1 drop(s) to each affected eye 2 times a day as needed  LIDOCAINE/PRILOCAINE 2.5-2.5% CRM: APPLY RECTALLY TWICE A DAY as needed  metoprolol succinate 50 mg oral tablet, extended release: 1 tab(s) orally once a day  mirtazapine 7.5 mg oral tablet: 1 orally once a day  MONTELUKAST SODIUM 10MG TABLET: TAKE 1 TABLET (10 MG) BY MOUTH DAILY  Multiple Vitamins oral tablet: 1 tab(s) orally once a day  nystatin 100,000 units/mL oral suspension: 5 milliliter(s) orally 4 times a day  OMEPRAZOLE-SODIUM BICARBONATE 40-1680MG PACK: 1 PACKET ORALLY DAILY MIXED WITH 5 TO 10 ML OF WATER ON AN EMPTY STOMACH  outpatient physical therapy 3 x a week for 6 weeks:   ProAir HFA 90 mcg/inh inhalation aerosol: 2 puff(s) inhaled every 6 hours, As Needed  sennosides-docusate 8.6 mg-50 mg oral tablet: 2 tab(s) orally once a day (at bedtime), As Needed  sodium chloride 3% inhalation solution: 1 inhaled 3 times a day  Spiriva 18 mcg inhalation capsule: 1 cap(s) inhaled once a day  Symbicort 160 mcg-4.5 mcg/inh inhalation aerosol: 2 puff(s) inhaled 2 times a day   acetylcysteine 10% inhalation solution: 4 milliliter(s) inhaled 2 times a day  Admelog SoloStar 100 units/mL injectable solution: 7 unit(s) injectable 3 times a day (before meals) (depending on reading).  ALBUTEROL SULFATE (2.5 MG/3ML)0.083% NEBULIZER: USE 1 UNIT DOSE EVERY 4-6 HOURS AS NEEDED FOR WHEEZING .  AMLODIPINE BESYLATE 5MG TABLET: TAKE 1 TABLET (5 MG) BY MOUTH DAILY FOR HIGH BLOOD PRESSURE  benzonatate 100 mg oral capsule: 1 orally 3 times a day  bisacodyl 5 mg oral delayed release tablet: 1 tab(s) orally once daily as needed  Calcium 600+D 600 mg-5 mcg (200 intl units) oral tablet: 1 tab(s) orally 2 times a day  CYANOCOBALAMIN 1000MCG/ML SOLUTION: ADMINISTER 1 MILLILITER (1,000 MCG) SUBCUTANEOUS EVERY 7 DAYS  FERROUS GLUCONATE 324 (38 FE)MG TABLET: TAKE 1 TABLET BY MOUTH 2 TIMES PER DAY BETWEEN MEALS IN WATER OR JUICE  Flonase 50 mcg/inh nasal spray: 1 spray(s) in each nostril once a day as needed  GNP GAS RELIEF 80 MG ORAL TABLET CHEWABLE: TAKE 1 TABLET BY MOUTH 4 TIMES PER DAY AFTER MEALS AND AT BEDTIME AS NEEDED FOR GAS  insulin glargine 100 units/mL subcutaneous solution: 8 unit(s) subcutaneous once a day (at bedtime)  ketotifen 0.025% ophthalmic solution: 1 drop(s) to each affected eye 2 times a day as needed  melatonin 3 mg oral tablet: 2 tab(s) orally once a day (at bedtime)  metoprolol succinate 50 mg oral tablet, extended release: 1 tab(s) orally once a day  mirtazapine 7.5 mg oral tablet: 1 orally once a day  MONTELUKAST SODIUM 10MG TABLET: TAKE 1 TABLET (10 MG) BY MOUTH DAILY  Multiple Vitamins oral tablet: 1 tab(s) orally once a day  nystatin 100,000 units/mL oral suspension: 5 milliliter(s) orally 4 times a day  OMEPRAZOLE-SODIUM BICARBONATE 40-1680MG PACK: 1 PACKET ORALLY DAILY MIXED WITH 5 TO 10 ML OF WATER ON AN EMPTY STOMACH  outpatient physical therapy 3 x a week for 6 weeks:   polyethylene glycol 3350 oral powder for reconstitution: 17 gram(s) orally 2 times a day  ProAir HFA 90 mcg/inh inhalation aerosol: 2 puff(s) inhaled every 6 hours, As Needed  sennosides-docusate 8.6 mg-50 mg oral tablet: 2 tab(s) orally once a day (at bedtime), As Needed  sodium chloride 3% inhalation solution: 1 inhaled 3 times a day  Symbicort 160 mcg-4.5 mcg/inh inhalation aerosol: 2 puff(s) inhaled 2 times a day  tamsulosin 0.4 mg oral capsule: 1 cap(s) orally once a day (at bedtime)   acetylcysteine 10% inhalation solution: 4 milliliter(s) inhaled 2 times a day  Admelog SoloStar 100 units/mL injectable solution: 2 unit(s) injectable 3 times a day (before meals) (depending on reading).  ALBUTEROL SULFATE (2.5 MG/3ML)0.083% NEBULIZER: USE 1 UNIT DOSE EVERY 4-6 HOURS AS NEEDED FOR WHEEZING .  AMLODIPINE BESYLATE 5MG TABLET: TAKE 1 TABLET (5 MG) BY MOUTH DAILY FOR HIGH BLOOD PRESSURE  azithromycin 500 mg oral tablet: 1 tab(s) orally Monday, Wednesday, and Friday  bisacodyl 5 mg oral delayed release tablet: 1 tab(s) orally once daily as needed  Calcium 600+D 600 mg-5 mcg (200 intl units) oral tablet: 1 tab(s) orally 2 times a day  CYANOCOBALAMIN 1000MCG/ML SOLUTION: ADMINISTER 1 MILLILITER (1,000 MCG) SUBCUTANEOUS EVERY 7 DAYS  FERROUS GLUCONATE 324 (38 FE)MG TABLET: TAKE 1 TABLET BY MOUTH 2 TIMES PER DAY BETWEEN MEALS IN WATER OR JUICE  Flonase 50 mcg/inh nasal spray: 1 spray(s) in each nostril once a day as needed  GNP GAS RELIEF 80 MG ORAL TABLET CHEWABLE: TAKE 1 TABLET BY MOUTH 4 TIMES PER DAY AFTER MEALS AND AT BEDTIME AS NEEDED FOR GAS  insulin glargine 100 units/mL subcutaneous solution: 6 unit(s) subcutaneous once a day (at bedtime)  ketotifen 0.025% ophthalmic solution: 1 drop(s) to each affected eye 2 times a day as needed  lactobacillus acidophilus oral capsule: 1 cap(s) orally once a day  melatonin 3 mg oral tablet: 2 tab(s) orally once a day (at bedtime)  metoprolol succinate 50 mg oral tablet, extended release: 1 tab(s) orally once a day  mirtazapine 7.5 mg oral tablet: 1 orally once a day  MONTELUKAST SODIUM 10MG TABLET: TAKE 1 TABLET (10 MG) BY MOUTH DAILY  Multiple Vitamins oral tablet: 1 tab(s) orally once a day  nystatin 100,000 units/mL oral suspension: 5 milliliter(s) orally 4 times a day  OMEPRAZOLE-SODIUM BICARBONATE 40-1680MG PACK: 1 PACKET ORALLY DAILY MIXED WITH 5 TO 10 ML OF WATER ON AN EMPTY STOMACH  outpatient physical therapy 3 x a week for 6 weeks:   polyethylene glycol 3350 oral powder for reconstitution: 17 gram(s) orally 2 times a day  ProAir HFA 90 mcg/inh inhalation aerosol: 2 puff(s) inhaled every 6 hours, As Needed  sennosides-docusate 8.6 mg-50 mg oral tablet: 2 tab(s) orally once a day (at bedtime), As Needed  sodium chloride 3% inhalation solution: 1 inhaled 3 times a day  Symbicort 160 mcg-4.5 mcg/inh inhalation aerosol: 2 puff(s) inhaled 2 times a day  tamsulosin 0.4 mg oral capsule: 1 cap(s) orally once a day (at bedtime)   acetylcysteine 10% inhalation solution: 4 milliliter(s) inhaled 2 times a day  Admelog SoloStar 100 units/mL injectable solution: 2 unit(s) injectable 3 times a day (before meals) (depending on reading).  ALBUTEROL SULFATE (2.5 MG/3ML)0.083% NEBULIZER: USE 1 UNIT DOSE EVERY 4-6 HOURS AS NEEDED FOR WHEEZING .  AMLODIPINE BESYLATE 5MG TABLET: TAKE 1 TABLET (5 MG) BY MOUTH DAILY FOR HIGH BLOOD PRESSURE  azithromycin 500 mg oral tablet: 1 tab(s) orally Monday, Wednesday, and Friday  bisacodyl 5 mg oral delayed release tablet: 1 tab(s) orally once daily as needed  Calcium 600+D 600 mg-5 mcg (200 intl units) oral tablet: 1 tab(s) orally 2 times a day  CYANOCOBALAMIN 1000MCG/ML SOLUTION: ADMINISTER 1 MILLILITER (1,000 MCG) SUBCUTANEOUS EVERY 7 DAYS  FERROUS GLUCONATE 324 (38 FE)MG TABLET: TAKE 1 TABLET BY MOUTH 2 TIMES PER DAY BETWEEN MEALS IN WATER OR JUICE  Flonase 50 mcg/inh nasal spray: 1 spray(s) in each nostril once a day as needed  GNP GAS RELIEF 80 MG ORAL TABLET CHEWABLE: TAKE 1 TABLET BY MOUTH 4 TIMES PER DAY AFTER MEALS AND AT BEDTIME AS NEEDED FOR GAS  insulin glargine 100 units/mL subcutaneous solution: 6 unit(s) subcutaneous once a day (at bedtime)  ketotifen 0.025% ophthalmic solution: 1 drop(s) to each affected eye 2 times a day as needed  lactobacillus acidophilus oral capsule: 1 cap(s) orally once a day  melatonin 3 mg oral tablet: 2 tab(s) orally once a day (at bedtime)  metoprolol succinate 50 mg oral tablet, extended release: 1 tab(s) orally once a day  mirtazapine 7.5 mg oral tablet: 1 orally once a day  MONTELUKAST SODIUM 10MG TABLET: TAKE 1 TABLET (10 MG) BY MOUTH DAILY  Multiple Vitamins oral tablet: 1 tab(s) orally once a day  nystatin 100,000 units/mL oral suspension: 5 milliliter(s) orally 4 times a day  OMEPRAZOLE-SODIUM BICARBONATE 40-1680MG PACK: 1 PACKET ORALLY DAILY MIXED WITH 5 TO 10 ML OF WATER ON AN EMPTY STOMACH  outpatient physical therapy 3 x a week for 6 weeks:   Physical therapy: 3-5x /week MDD: 1  polyethylene glycol 3350 oral powder for reconstitution: 17 gram(s) orally 2 times a day  ProAir HFA 90 mcg/inh inhalation aerosol: 2 puff(s) inhaled every 6 hours, As Needed  sennosides-docusate 8.6 mg-50 mg oral tablet: 2 tab(s) orally once a day (at bedtime), As Needed  sodium chloride 3% inhalation solution: 1 inhaled 3 times a day  Symbicort 160 mcg-4.5 mcg/inh inhalation aerosol: 2 puff(s) inhaled 2 times a day  tamsulosin 0.4 mg oral capsule: 1 cap(s) orally once a day (at bedtime)

## 2023-04-01 NOTE — PROGRESS NOTE ADULT - SUBJECTIVE AND OBJECTIVE BOX
**************************************  Agnes Jorge, PGY-1  After 7PM, please contact night float at #[phone] or [phone]  **************************************    INTERVAL HPI/OVERNIGHT EVENTS:  Patient was seen and examined at bedside. NAEON. Patient tolerated AVAPS overnight. Patient endorses having BM overnight.     VITAL SIGNS:  T(F): 98.1 (03-31-23 @ 22:23)  HR: 83 (04-01-23 @ 04:36)  BP: 117/53 (03-31-23 @ 22:23)  RR: 18 (03-31-23 @ 22:23)  SpO2: 100% (04-01-23 @ 04:36)  Wt(kg): --      PHYSICAL EXAM:  GENERAL: NAD, lying in bed comfortable, appears older than chronological age, Nasal cannula in place with no respiratory distress.   HEAD:  Atraumatic, normocephalic  EYES: EOMI, PERRLA, conjunctiva and sclera clear  ENT: Moist mucous membranes  NECK: Supple, no JVD  HEART: Tachycardic rate, regular rhythm, no murmurs, rubs, or gallops  LUNGS: b/l wheezes heard  ABDOMEN: Soft, nontender, nondistended, +BS  EXTREMITIES: 2+ peripheral pulses bilaterally. No clubbing, cyanosis, or edema  NERVOUS SYSTEM:  A&Ox3, no focal deficits   SKIN: No rashes or lesions          MEDICATIONS  (STANDING):  acetylcysteine 10%  Inhalation 4 milliLiter(s) Inhalation two times a day  amLODIPine   Tablet 5 milliGRAM(s) Oral daily  budesonide 160 MICROgram(s)/formoterol 4.5 MICROgram(s) Inhaler 2 Puff(s) Inhalation two times a day  cefepime   IVPB 2000 milliGRAM(s) IV Intermittent every 12 hours  cefepime   IVPB      chlorhexidine 2% Cloths 1 Application(s) Topical daily  dextrose 5%. 1000 milliLiter(s) (50 mL/Hr) IV Continuous <Continuous>  dextrose 5%. 1000 milliLiter(s) (100 mL/Hr) IV Continuous <Continuous>  dextrose 50% Injectable 25 Gram(s) IV Push once  dextrose 50% Injectable 12.5 Gram(s) IV Push once  dextrose 50% Injectable 25 Gram(s) IV Push once  dextrose Oral Gel 15 Gram(s) Oral once  enoxaparin Injectable 30 milliGRAM(s) SubCutaneous every 24 hours  fluticasone propionate 50 MICROgram(s)/spray Nasal Spray 1 Spray(s) Both Nostrils two times a day  glucagon  Injectable 1 milliGRAM(s) IntraMuscular once  influenza  Vaccine (HIGH DOSE) 0.7 milliLiter(s) IntraMuscular once  insulin glargine Injectable (LANTUS) 8 Unit(s) SubCutaneous at bedtime  insulin lispro (ADMELOG) corrective regimen sliding scale   SubCutaneous three times a day before meals  insulin lispro (ADMELOG) corrective regimen sliding scale   SubCutaneous at bedtime  insulin lispro Injectable (ADMELOG) 5 Unit(s) SubCutaneous three times a day before meals  ketotifen 0.025% Ophthalmic Solution 1 Drop(s) Both EYES two times a day  levalbuterol Inhalation 1.25 milliGRAM(s) Inhalation every 4 hours  metoprolol succinate ER 50 milliGRAM(s) Oral daily  mirtazapine 7.5 milliGRAM(s) Oral at bedtime  montelukast 10 milliGRAM(s) Oral daily  nystatin    Suspension 896373 Unit(s) Oral four times a day  pantoprazole    Tablet 40 milliGRAM(s) Oral before breakfast  polyethylene glycol 3350 17 Gram(s) Oral two times a day  senna 2 Tablet(s) Oral at bedtime  sodium chloride 3%  Inhalation 4 milliLiter(s) Inhalation every 12 hours  sorbitol 70%/mineral oil/magnesium hydroxide/glycerin Enema 120 milliLiter(s) Rectal once  tiotropium 2.5 MICROgram(s) Inhaler 2 Puff(s) Inhalation daily    MEDICATIONS  (PRN):  acetaminophen     Tablet .. 650 milliGRAM(s) Oral every 6 hours PRN Temp greater or equal to 38C (100.4F), Mild Pain (1 - 3)  aluminum hydroxide/magnesium hydroxide/simethicone Suspension 30 milliLiter(s) Oral every 4 hours PRN Dyspepsia  bisacodyl Suppository 10 milliGRAM(s) Rectal daily PRN Constipation  guaifenesin/dextromethorphan Oral Liquid 10 milliLiter(s) Oral every 8 hours PRN Cough  melatonin 3 milliGRAM(s) Oral at bedtime PRN Insomnia  ondansetron Injectable 4 milliGRAM(s) IV Push every 8 hours PRN Nausea and/or Vomiting      Allergies    No Known Allergies    Intolerances        LABS:                        13.3   13.96 )-----------( 191      ( 31 Mar 2023 06:00 )             41.2     03-31    132<L>  |  93<L>  |  13  ----------------------------<  335<H>  5.0   |  37<H>  |  0.39<L>    Ca    8.9      31 Mar 2023 17:30  Phos  3.4     03-31  Mg     2.30     03-31    TPro  6.2  /  Alb  3.1<L>  /  TBili  0.2  /  DBili  x   /  AST  38<H>  /  ALT  60<H>  /  AlkPhos  218<H>  03-31          RADIOLOGY & ADDITIONAL TESTS:  Reviewed **************************************  Agnes Jorge, PGY-1  After 7PM, please contact night float at [phone] or [phone]  **************************************    INTERVAL HPI/OVERNIGHT EVENTS:  Patient was seen and examined at bedside. NAEON, patient somnolent this AM. Patient tolerated AVAPS overnight per nursing. Patient had BM yesterday, denied SMOG enema in the PM.       VITAL SIGNS:  T(F): 98.1 (03-31-23 @ 22:23)  HR: 83 (04-01-23 @ 04:36)  BP: 117/53 (03-31-23 @ 22:23)  RR: 18 (03-31-23 @ 22:23)  SpO2: 100% (04-01-23 @ 04:36)  Wt(kg): --      PHYSICAL EXAM:  GENERAL: NAD, lying in bed comfortable, appears older than chronological age, Nasal cannula in place with no respiratory distress.   HEAD:  Atraumatic, normocephalic  EYES: EOMI, PERRLA, conjunctiva and sclera clear  ENT: Moist mucous membranes  NECK: Supple, no JVD  HEART: Tachycardic rate, regular rhythm, no murmurs, rubs, or gallops  LUNGS: b/l wheezes heard  ABDOMEN: Soft, nontender, nondistended, +BS  EXTREMITIES: 2+ peripheral pulses bilaterally. No clubbing, cyanosis, or edema  NERVOUS SYSTEM:  A&Ox3, no focal deficits   SKIN: No rashes or lesions          MEDICATIONS  (STANDING):  acetylcysteine 10%  Inhalation 4 milliLiter(s) Inhalation two times a day  amLODIPine   Tablet 5 milliGRAM(s) Oral daily  budesonide 160 MICROgram(s)/formoterol 4.5 MICROgram(s) Inhaler 2 Puff(s) Inhalation two times a day  cefepime   IVPB 2000 milliGRAM(s) IV Intermittent every 12 hours  cefepime   IVPB      chlorhexidine 2% Cloths 1 Application(s) Topical daily  dextrose 5%. 1000 milliLiter(s) (50 mL/Hr) IV Continuous <Continuous>  dextrose 5%. 1000 milliLiter(s) (100 mL/Hr) IV Continuous <Continuous>  dextrose 50% Injectable 25 Gram(s) IV Push once  dextrose 50% Injectable 12.5 Gram(s) IV Push once  dextrose 50% Injectable 25 Gram(s) IV Push once  dextrose Oral Gel 15 Gram(s) Oral once  enoxaparin Injectable 30 milliGRAM(s) SubCutaneous every 24 hours  fluticasone propionate 50 MICROgram(s)/spray Nasal Spray 1 Spray(s) Both Nostrils two times a day  glucagon  Injectable 1 milliGRAM(s) IntraMuscular once  influenza  Vaccine (HIGH DOSE) 0.7 milliLiter(s) IntraMuscular once  insulin glargine Injectable (LANTUS) 8 Unit(s) SubCutaneous at bedtime  insulin lispro (ADMELOG) corrective regimen sliding scale   SubCutaneous three times a day before meals  insulin lispro (ADMELOG) corrective regimen sliding scale   SubCutaneous at bedtime  insulin lispro Injectable (ADMELOG) 5 Unit(s) SubCutaneous three times a day before meals  ketotifen 0.025% Ophthalmic Solution 1 Drop(s) Both EYES two times a day  levalbuterol Inhalation 1.25 milliGRAM(s) Inhalation every 4 hours  metoprolol succinate ER 50 milliGRAM(s) Oral daily  mirtazapine 7.5 milliGRAM(s) Oral at bedtime  montelukast 10 milliGRAM(s) Oral daily  nystatin    Suspension [id number] Unit(s) Oral four times a day  pantoprazole    Tablet 40 milliGRAM(s) Oral before breakfast  polyethylene glycol 3350 17 Gram(s) Oral two times a day  senna 2 Tablet(s) Oral at bedtime  sodium chloride 3%  Inhalation 4 milliLiter(s) Inhalation every 12 hours  sorbitol 70%/mineral oil/magnesium hydroxide/glycerin Enema 120 milliLiter(s) Rectal once  tiotropium 2.5 MICROgram(s) Inhaler 2 Puff(s) Inhalation daily    MEDICATIONS  (PRN):  acetaminophen     Tablet .. 650 milliGRAM(s) Oral every 6 hours PRN Temp greater or equal to 38C (100.4F), Mild Pain (1 - 3)  aluminum hydroxide/magnesium hydroxide/simethicone Suspension 30 milliLiter(s) Oral every 4 hours PRN Dyspepsia  bisacodyl Suppository 10 milliGRAM(s) Rectal daily PRN Constipation  guaifenesin/dextromethorphan Oral Liquid 10 milliLiter(s) Oral every 8 hours PRN Cough  melatonin 3 milliGRAM(s) Oral at bedtime PRN Insomnia  ondansetron Injectable 4 milliGRAM(s) IV Push every 8 hours PRN Nausea and/or Vomiting      Allergies    No Known Allergies    Intolerances        LABS:                        13.3   13.96 )-----------( 191      ( 31 Mar 2023 06:00 )             41.2     03-31    132<L>  |  93<L>  |  13  ----------------------------<  335<H>  5.0   |  37<H>  |  0.39<L>    Ca    8.9      31 Mar 2023 17:30  Phos  3.4     03-31  Mg     2.30     03-31    TPro  6.2  /  Alb  3.1<L>  /  TBili  0.2  /  DBili  x   /  AST  38<H>  /  ALT  60<H>  /  AlkPhos  218<H>  03-31          RADIOLOGY & ADDITIONAL TESTS:  Reviewed

## 2023-04-01 NOTE — PROGRESS NOTE ADULT - PROBLEM SELECTOR PROBLEM 4
Pt's HR tachycardic to 140s-160s afib. Pt in bed, trying to get comfortable. Assisted to reposition. Pt in no apparent distress, BP 140s/90s. MD notified.   Report to [facility]. Pt's HR decreasing with rest in bed to 120s. MD inputting orders.    Hyponatremia

## 2023-04-02 LAB
ALBUMIN SERPL ELPH-MCNC: 2.6 G/DL — LOW (ref 3.3–5)
ALP SERPL-CCNC: 171 U/L — HIGH (ref 40–120)
ALT FLD-CCNC: 47 U/L — HIGH (ref 4–33)
ANION GAP SERPL CALC-SCNC: 2 MMOL/L — LOW (ref 7–14)
AST SERPL-CCNC: 28 U/L — SIGNIFICANT CHANGE UP (ref 4–32)
BILIRUB SERPL-MCNC: 0.2 MG/DL — SIGNIFICANT CHANGE UP (ref 0.2–1.2)
BUN SERPL-MCNC: 14 MG/DL — SIGNIFICANT CHANGE UP (ref 7–23)
CALCIUM SERPL-MCNC: 8.8 MG/DL — SIGNIFICANT CHANGE UP (ref 8.4–10.5)
CHLORIDE SERPL-SCNC: 92 MMOL/L — LOW (ref 98–107)
CO2 SERPL-SCNC: 36 MMOL/L — HIGH (ref 22–31)
CREAT SERPL-MCNC: 0.34 MG/DL — LOW (ref 0.5–1.3)
EGFR: 113 ML/MIN/1.73M2 — SIGNIFICANT CHANGE UP
GAS PNL BLDV: SIGNIFICANT CHANGE UP
GLUCOSE BLDC GLUCOMTR-MCNC: 143 MG/DL — HIGH (ref 70–99)
GLUCOSE BLDC GLUCOMTR-MCNC: 148 MG/DL — HIGH (ref 70–99)
GLUCOSE BLDC GLUCOMTR-MCNC: 191 MG/DL — HIGH (ref 70–99)
GLUCOSE BLDC GLUCOMTR-MCNC: 194 MG/DL — HIGH (ref 70–99)
GLUCOSE SERPL-MCNC: 157 MG/DL — HIGH (ref 70–99)
HCT VFR BLD CALC: 37.1 % — SIGNIFICANT CHANGE UP (ref 34.5–45)
HGB BLD-MCNC: 11.2 G/DL — LOW (ref 11.5–15.5)
MAGNESIUM SERPL-MCNC: 2.2 MG/DL — SIGNIFICANT CHANGE UP (ref 1.6–2.6)
MCHC RBC-ENTMCNC: 26.2 PG — LOW (ref 27–34)
MCHC RBC-ENTMCNC: 30.2 GM/DL — LOW (ref 32–36)
MCV RBC AUTO: 86.7 FL — SIGNIFICANT CHANGE UP (ref 80–100)
NRBC # BLD: 0 /100 WBCS — SIGNIFICANT CHANGE UP (ref 0–0)
NRBC # FLD: 0 K/UL — SIGNIFICANT CHANGE UP (ref 0–0)
PHOSPHATE SERPL-MCNC: 2 MG/DL — LOW (ref 2.5–4.5)
PLATELET # BLD AUTO: 167 K/UL — SIGNIFICANT CHANGE UP (ref 150–400)
POTASSIUM SERPL-MCNC: 4.9 MMOL/L — SIGNIFICANT CHANGE UP (ref 3.5–5.3)
POTASSIUM SERPL-SCNC: 4.9 MMOL/L — SIGNIFICANT CHANGE UP (ref 3.5–5.3)
PROT SERPL-MCNC: 5.5 G/DL — LOW (ref 6–8.3)
RBC # BLD: 4.28 M/UL — SIGNIFICANT CHANGE UP (ref 3.8–5.2)
RBC # FLD: 13.6 % — SIGNIFICANT CHANGE UP (ref 10.3–14.5)
SODIUM SERPL-SCNC: 130 MMOL/L — LOW (ref 135–145)
WBC # BLD: 10.91 K/UL — HIGH (ref 3.8–10.5)
WBC # FLD AUTO: 10.91 K/UL — HIGH (ref 3.8–10.5)

## 2023-04-02 PROCEDURE — 99232 SBSQ HOSP IP/OBS MODERATE 35: CPT | Mod: GC

## 2023-04-02 RX ORDER — SODIUM,POTASSIUM PHOSPHATES 278-250MG
2 POWDER IN PACKET (EA) ORAL THREE TIMES A DAY
Refills: 0 | Status: DISCONTINUED | OUTPATIENT
Start: 2023-04-02 | End: 2023-04-03

## 2023-04-02 RX ADMIN — KETOTIFEN FUMARATE 1 DROP(S): 0.34 SOLUTION OPHTHALMIC at 21:19

## 2023-04-02 RX ADMIN — BUDESONIDE AND FORMOTEROL FUMARATE DIHYDRATE 2 PUFF(S): 160; 4.5 AEROSOL RESPIRATORY (INHALATION) at 21:21

## 2023-04-02 RX ADMIN — Medication 7 UNIT(S): at 12:41

## 2023-04-02 RX ADMIN — MONTELUKAST 10 MILLIGRAM(S): 4 TABLET, CHEWABLE ORAL at 12:48

## 2023-04-02 RX ADMIN — Medication 4 MILLILITER(S): at 21:45

## 2023-04-02 RX ADMIN — Medication 1 SPRAY(S): at 07:32

## 2023-04-02 RX ADMIN — BUDESONIDE AND FORMOTEROL FUMARATE DIHYDRATE 2 PUFF(S): 160; 4.5 AEROSOL RESPIRATORY (INHALATION) at 09:06

## 2023-04-02 RX ADMIN — POLYETHYLENE GLYCOL 3350 17 GRAM(S): 17 POWDER, FOR SOLUTION ORAL at 07:29

## 2023-04-02 RX ADMIN — Medication 3 MILLILITER(S): at 03:53

## 2023-04-02 RX ADMIN — Medication 4 MILLILITER(S): at 11:21

## 2023-04-02 RX ADMIN — Medication 7 UNIT(S): at 09:03

## 2023-04-02 RX ADMIN — POLYETHYLENE GLYCOL 3350 17 GRAM(S): 17 POWDER, FOR SOLUTION ORAL at 17:46

## 2023-04-02 RX ADMIN — ENOXAPARIN SODIUM 30 MILLIGRAM(S): 100 INJECTION SUBCUTANEOUS at 21:20

## 2023-04-02 RX ADMIN — PANTOPRAZOLE SODIUM 40 MILLIGRAM(S): 20 TABLET, DELAYED RELEASE ORAL at 07:31

## 2023-04-02 RX ADMIN — Medication 3 MILLILITER(S): at 16:36

## 2023-04-02 RX ADMIN — Medication 50 MILLIGRAM(S): at 07:31

## 2023-04-02 RX ADMIN — Medication 30 MILLILITER(S): at 19:43

## 2023-04-02 RX ADMIN — Medication 2 PACKET(S): at 15:41

## 2023-04-02 RX ADMIN — CEFEPIME 100 MILLIGRAM(S): 1 INJECTION, POWDER, FOR SOLUTION INTRAMUSCULAR; INTRAVENOUS at 07:32

## 2023-04-02 RX ADMIN — Medication 500000 UNIT(S): at 07:30

## 2023-04-02 RX ADMIN — AMLODIPINE BESYLATE 5 MILLIGRAM(S): 2.5 TABLET ORAL at 07:32

## 2023-04-02 RX ADMIN — CEFEPIME 100 MILLIGRAM(S): 1 INJECTION, POWDER, FOR SOLUTION INTRAMUSCULAR; INTRAVENOUS at 17:47

## 2023-04-02 RX ADMIN — MIRTAZAPINE 7.5 MILLIGRAM(S): 45 TABLET, ORALLY DISINTEGRATING ORAL at 21:20

## 2023-04-02 RX ADMIN — CHLORHEXIDINE GLUCONATE 1 APPLICATION(S): 213 SOLUTION TOPICAL at 21:22

## 2023-04-02 RX ADMIN — SENNA PLUS 2 TABLET(S): 8.6 TABLET ORAL at 21:20

## 2023-04-02 RX ADMIN — Medication 7 UNIT(S): at 17:44

## 2023-04-02 RX ADMIN — Medication 1: at 17:44

## 2023-04-02 RX ADMIN — SODIUM CHLORIDE 4 MILLILITER(S): 9 INJECTION INTRAMUSCULAR; INTRAVENOUS; SUBCUTANEOUS at 11:22

## 2023-04-02 RX ADMIN — KETOTIFEN FUMARATE 1 DROP(S): 0.34 SOLUTION OPHTHALMIC at 07:33

## 2023-04-02 RX ADMIN — Medication 500000 UNIT(S): at 23:05

## 2023-04-02 RX ADMIN — SODIUM CHLORIDE 4 MILLILITER(S): 9 INJECTION INTRAMUSCULAR; INTRAVENOUS; SUBCUTANEOUS at 21:45

## 2023-04-02 RX ADMIN — Medication 3 MILLILITER(S): at 21:45

## 2023-04-02 RX ADMIN — INSULIN GLARGINE 8 UNIT(S): 100 INJECTION, SOLUTION SUBCUTANEOUS at 23:05

## 2023-04-02 RX ADMIN — Medication 500000 UNIT(S): at 17:46

## 2023-04-02 RX ADMIN — Medication 30 MILLILITER(S): at 15:40

## 2023-04-02 RX ADMIN — Medication 3 MILLILITER(S): at 11:21

## 2023-04-02 RX ADMIN — Medication 2 PACKET(S): at 21:26

## 2023-04-02 RX ADMIN — Medication 500000 UNIT(S): at 12:47

## 2023-04-02 RX ADMIN — Medication 500000 UNIT(S): at 00:39

## 2023-04-02 NOTE — PROGRESS NOTE ADULT - PROBLEM SELECTOR PLAN 7
medical evaluation - DVT: lovenox  - diet: soft and bite sized per son w/ 1.2L fluid restriction  - dispo: pending PT/OT - DVT: lovenox  - diet: soft and bite sized per son w/ 1.2L fluid restriction  - dispo: Home PT   - Communication: 4/2 spoke with son Lilia 10:04AM

## 2023-04-02 NOTE — PROGRESS NOTE ADULT - PROBLEM SELECTOR PLAN 1
- pt met sepsis criteria due to elevated WBC, increased WOB (RR), and elevated lactate  - s/p ceftriaxone in the ED  - outpt record showed colonization of sputum w/ Pseudomonas and Klebsiella  - on zithromax, pending Pulm recs   - urine legionalla negative   - Bcx NGTD, Scx NGTD   - RESOLVED    - pt met sepsis criteria due to elevated WBC, increased WOB (RR), and elevated lactate  - s/p ceftriaxone in the ED; now on cefepime; dc azithromycin (U legionella neg)  - outpt record showed colonization of sputum w/ Pseudomonas and Klebsiella  - Bcx NGTD, Scx NGTD

## 2023-04-02 NOTE — PROGRESS NOTE ADULT - ATTENDING COMMENTS
was on AVAPs through the night - Repeat VBG improved and MS better  cw cefepime 5-7 day course, Chest PT, aerobika, nebs  monitor FS on inc insulin  valerio pulm

## 2023-04-02 NOTE — PROGRESS NOTE ADULT - PROBLEM SELECTOR PLAN 4
- Na 117 at admission, (123 w/ correction)  baseline 135 outpatient, 132 w/ correction this AM   - ddx include Legionaire disease considering respiratory distress vs poor PO intake vs SIADH  - s/p 1L of NS in the ED   - fluid restriction 1.2L  - hyponatremia workup consistent w/ SIADH however patient appears volume down - Na 117 at admission, (123 w/ correction)  baseline 135 outpatient, 132; now 130   - ddx hypovolemic 2/2 poor PO intake vs SIADH  - fluid restriction 1.2L  - hyponatremia workup consistent w/ SIADH however patient appears volume down

## 2023-04-02 NOTE — PROGRESS NOTE ADULT - PROBLEM SELECTOR PLAN 3
- pt constipated per HPI and CT scan  - no bowel obstruction appreciated on exam, abd soft non-tender  - senna, miralax BID, dulcolax, SMOG enema   - BM today, s/p SMOG enema - pt constipated per HPI and CT scan  - no bowel obstruction appreciated on exam, abd soft non-tender  - senna, miralax BID, dulcolax,  - BM today,

## 2023-04-02 NOTE — PROGRESS NOTE ADULT - PROBLEM SELECTOR PLAN 2
- Hx of PCD, difficulty clearing mucus per pt  - aggressive pulm toileting, including chest PT, mucomyst, hypersal, chest vest q12h, aerobika q12h   - c/w symbicort, singular  - now on xopenex 2/2 sinus tach   - pulm consulted, follow recs   - CT Chest: New clustered nodules/tree-in-bud opacities in the right upper and left lower lobes suggesting infectious etiology with endobronchial spread. - Hx of PCD, difficulty clearing mucus per pt  - aggressive pulm toileting, including chest PT, mucomyst, hypersal, chest vest q12h, aerobika q12h   - c/w symbicort, singular, standing duonebs   - pulm consulted, follow recs   - CT Chest: New clustered nodules/tree-in-bud opacities in the right upper and left lower lobes suggesting infectious etiology with endobronchial spread.

## 2023-04-02 NOTE — PROGRESS NOTE ADULT - PROBLEM SELECTOR PLAN 6
- will hold lasix iso hypo-Na  - c/w amlodipine 5 w/ holding parameters  - started on metoprolol succinate 50 mg qd home dose - Hold lasix iso hypo-Na  - c/w amlodipine 5 w/ holding parameters  - c/w metoprolol succinate 50 mg qd home dose

## 2023-04-02 NOTE — PROGRESS NOTE ADULT - ASSESSMENT
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

## 2023-04-02 NOTE — PROGRESS NOTE ADULT - SUBJECTIVE AND OBJECTIVE BOX
%%%%INCOMPLETE NOTE%%%%%     Dr. Real Yin PGY3    GRACIE BHAKTA  68y  MRN: [id number]    Subjective:    Patient is a 68y old  Female who presents with a chief complaint of SOB (01 Apr 2023 15:26)      MEDICATIONS  (STANDING):  acetylcysteine 10%  Inhalation 4 milliLiter(s) Inhalation two times a day  albuterol/ipratropium for Nebulization 3 milliLiter(s) Nebulizer every 6 hours  amLODIPine   Tablet 5 milliGRAM(s) Oral daily  budesonide 160 MICROgram(s)/formoterol 4.5 MICROgram(s) Inhaler 2 Puff(s) Inhalation two times a day  cefepime   IVPB 2000 milliGRAM(s) IV Intermittent every 12 hours  cefepime   IVPB      chlorhexidine 2% Cloths 1 Application(s) Topical daily  dextrose 5%. 1000 milliLiter(s) (100 mL/Hr) IV Continuous <Continuous>  dextrose 5%. 1000 milliLiter(s) (50 mL/Hr) IV Continuous <Continuous>  dextrose 50% Injectable 25 Gram(s) IV Push once  dextrose 50% Injectable 12.5 Gram(s) IV Push once  dextrose 50% Injectable 25 Gram(s) IV Push once  enoxaparin Injectable 30 milliGRAM(s) SubCutaneous every 24 hours  fluticasone propionate 50 MICROgram(s)/spray Nasal Spray 1 Spray(s) Both Nostrils two times a day  glucagon  Injectable 1 milliGRAM(s) IntraMuscular once  influenza  Vaccine (HIGH DOSE) 0.7 milliLiter(s) IntraMuscular once  insulin glargine Injectable (LANTUS) 8 Unit(s) SubCutaneous at bedtime  insulin lispro (ADMELOG) corrective regimen sliding scale   SubCutaneous three times a day before meals  insulin lispro (ADMELOG) corrective regimen sliding scale   SubCutaneous at bedtime  insulin lispro Injectable (ADMELOG) 7 Unit(s) SubCutaneous three times a day before meals  ketotifen 0.025% Ophthalmic Solution 1 Drop(s) Both EYES two times a day  metoprolol succinate ER 50 milliGRAM(s) Oral daily  mirtazapine 7.5 milliGRAM(s) Oral at bedtime  montelukast 10 milliGRAM(s) Oral daily  nystatin    Suspension 374941 Unit(s) Oral four times a day  pantoprazole    Tablet 40 milliGRAM(s) Oral before breakfast  polyethylene glycol 3350 17 Gram(s) Oral two times a day  senna 2 Tablet(s) Oral at bedtime  sodium chloride 3%  Inhalation 4 milliLiter(s) Inhalation every 12 hours    MEDICATIONS  (PRN):  acetaminophen     Tablet .. 650 milliGRAM(s) Oral every 6 hours PRN Temp greater or equal to 38C (100.4F), Mild Pain (1 - 3)  aluminum hydroxide/magnesium hydroxide/simethicone Suspension 30 milliLiter(s) Oral every 4 hours PRN Dyspepsia  bisacodyl Suppository 10 milliGRAM(s) Rectal daily PRN Constipation  dextrose Oral Gel 15 Gram(s) Oral once PRN Blood Glucose LESS THAN 70 milliGRAM(s)/deciliter  guaifenesin/dextromethorphan Oral Liquid 10 milliLiter(s) Oral every 8 hours PRN Cough  melatonin 3 milliGRAM(s) Oral at bedtime PRN Insomnia  ondansetron Injectable 4 milliGRAM(s) IV Push every 8 hours PRN Nausea and/or Vomiting        Objective:    Vitals: Vital Signs Last 24 Hrs  T(C): 36.7 (04-02-23 @ 05:18), Max: 36.9 (04-01-23 @ 21:01)  T(F): 98 (04-02-23 @ 05:18), Max: 98.5 (04-01-23 @ 21:01)  HR: 70 (04-02-23 @ 05:18) (70 - 83)  BP: 116/58 (04-02-23 @ 05:18) (116/58 - 127/65)  BP(mean): --  RR: 20 (04-02-23 @ 05:18) (20 - 20)  SpO2: 99% (04-02-23 @ 05:18) (97% - 100%)            I&O's Summary      PHYSICAL EXAM:  GENERAL: NAD, well-groomed, well-developed  HEAD:  Atraumatic, Normocephalic  EYES: EOMI, PERRLA, conjunctiva and sclera clear  ENMT: No tonsillar erythema, exudates, or enlargement; Moist mucous membranes, Good dentition, No lesions  NECK: Supple, No JVD, Normal thyroid  CHEST/LUNG: Clear to auscultation bilaterally; No rales, rhonchi, wheezing, or rubs  HEART: Regular rate and rhythm; No murmurs, rubs, or gallops  ABDOMEN: Soft, Nontender, Nondistended; Bowel sounds present  EXTREMITIES:  2+ Peripheral Pulses, No clubbing, cyanosis, or edema  LYMPH: No lymphadenopathy noted  SKIN: No rashes or lesions  NERVOUS SYSTEM:  Alert & Oriented X4, Good concentration  PSYCH: Normal Affect. Speaking in Full Sentences. Laying in bed comfortably; not agitated     LABS:                        12.1   10.36 )-----------( 176      ( 01 Apr 2023 05:20 )             40.2                         13.3   13.96 )-----------( 191      ( 31 Mar 2023 06:00 )             41.2                         12.6   15.28 )-----------( 192      ( 30 Mar 2023 09:45 )             39.3     Hgb Trend: 12.1<--, 13.3<--, 12.6<--, 14.0<--  04-01    134<L>  |  93<L>  |  13  ----------------------------<  193<H>  4.6   |  35<H>  |  0.34<L>  03-31    132<L>  |  93<L>  |  13  ----------------------------<  335<H>  5.0   |  37<H>  |  0.39<L>  03-31    132<L>  |  91<L>  |  12  ----------------------------<  244<H>  4.4   |  35<H>  |  0.44<L>    Ca    8.8      01 Apr 2023 05:20  Ca    8.9      31 Mar 2023 17:30  Ca    9.0      31 Mar 2023 06:00  Phos  2.9     04-01  Mg     2.30     04-01    TPro  6.1  /  Alb  3.0<L>  /  TBili  0.2  /  DBili  x   /  AST  37<H>  /  ALT  60<H>  /  AlkPhos  191<H>  04-01  TPro  6.2  /  Alb  3.1<L>  /  TBili  0.2  /  DBili  x   /  AST  38<H>  /  ALT  60<H>  /  AlkPhos  218<H>  03-31  TPro  6.5  /  Alb  3.4  /  TBili  0.3  /  DBili  x   /  AST  42<H>  /  ALT  65<H>  /  AlkPhos  218<H>  03-30    Creatinine Trend: 0.34<--, 0.39<--, 0.44<--, 0.41<--, 0.38<--, 0.41<--                    CAPILLARY BLOOD GLUCOSE      POCT Blood Glucose.: 104 mg/dL (01 Apr 2023 22:06)  POCT Blood Glucose.: 170 mg/dL (01 Apr 2023 17:14)  POCT Blood Glucose.: 200 mg/dL (01 Apr 2023 12:13)  POCT Blood Glucose.: 201 mg/dL (01 Apr 2023 08:23)     %%%%INCOMPLETE NOTE%%%%%     Dr. Real Yin PGY3    GRACIE BHAKTA  68y  MRN: [id number]    Subjective:    Patient is a 68y old  Female who presents with a chief complaint of SOB (01 Apr 2023 15:26)      MEDICATIONS  (STANDING):  acetylcysteine 10%  Inhalation 4 milliLiter(s) Inhalation two times a day  albuterol/ipratropium for Nebulization 3 milliLiter(s) Nebulizer every 6 hours  amLODIPine   Tablet 5 milliGRAM(s) Oral daily  budesonide 160 MICROgram(s)/formoterol 4.5 MICROgram(s) Inhaler 2 Puff(s) Inhalation two times a day  cefepime   IVPB 2000 milliGRAM(s) IV Intermittent every 12 hours  cefepime   IVPB      chlorhexidine 2% Cloths 1 Application(s) Topical daily  dextrose 5%. 1000 milliLiter(s) (100 mL/Hr) IV Continuous <Continuous>  dextrose 5%. 1000 milliLiter(s) (50 mL/Hr) IV Continuous <Continuous>  dextrose 50% Injectable 25 Gram(s) IV Push once  dextrose 50% Injectable 12.5 Gram(s) IV Push once  dextrose 50% Injectable 25 Gram(s) IV Push once  enoxaparin Injectable 30 milliGRAM(s) SubCutaneous every 24 hours  fluticasone propionate 50 MICROgram(s)/spray Nasal Spray 1 Spray(s) Both Nostrils two times a day  glucagon  Injectable 1 milliGRAM(s) IntraMuscular once  influenza  Vaccine (HIGH DOSE) 0.7 milliLiter(s) IntraMuscular once  insulin glargine Injectable (LANTUS) 8 Unit(s) SubCutaneous at bedtime  insulin lispro (ADMELOG) corrective regimen sliding scale   SubCutaneous three times a day before meals  insulin lispro (ADMELOG) corrective regimen sliding scale   SubCutaneous at bedtime  insulin lispro Injectable (ADMELOG) 7 Unit(s) SubCutaneous three times a day before meals  ketotifen 0.025% Ophthalmic Solution 1 Drop(s) Both EYES two times a day  metoprolol succinate ER 50 milliGRAM(s) Oral daily  mirtazapine 7.5 milliGRAM(s) Oral at bedtime  montelukast 10 milliGRAM(s) Oral daily  nystatin    Suspension 858886 Unit(s) Oral four times a day  pantoprazole    Tablet 40 milliGRAM(s) Oral before breakfast  polyethylene glycol 3350 17 Gram(s) Oral two times a day  senna 2 Tablet(s) Oral at bedtime  sodium chloride 3%  Inhalation 4 milliLiter(s) Inhalation every 12 hours    MEDICATIONS  (PRN):  acetaminophen     Tablet .. 650 milliGRAM(s) Oral every 6 hours PRN Temp greater or equal to 38C (100.4F), Mild Pain (1 - 3)  aluminum hydroxide/magnesium hydroxide/simethicone Suspension 30 milliLiter(s) Oral every 4 hours PRN Dyspepsia  bisacodyl Suppository 10 milliGRAM(s) Rectal daily PRN Constipation  dextrose Oral Gel 15 Gram(s) Oral once PRN Blood Glucose LESS THAN 70 milliGRAM(s)/deciliter  guaifenesin/dextromethorphan Oral Liquid 10 milliLiter(s) Oral every 8 hours PRN Cough  melatonin 3 milliGRAM(s) Oral at bedtime PRN Insomnia  ondansetron Injectable 4 milliGRAM(s) IV Push every 8 hours PRN Nausea and/or Vomiting        Objective:    Vitals: Vital Signs Last 24 Hrs  T(C): 36.7 (04-02-23 @ 05:18), Max: 36.9 (04-01-23 @ 21:01)  T(F): 98 (04-02-23 @ 05:18), Max: 98.5 (04-01-23 @ 21:01)  HR: 70 (04-02-23 @ 05:18) (70 - 83)  BP: 116/58 (04-02-23 @ 05:18) (116/58 - 127/65)  BP(mean): --  RR: 20 (04-02-23 @ 05:18) (20 - 20)  SpO2: 99% (04-02-23 @ 05:18) (97% - 100%)            I&O's Summary      PHYSICAL EXAM:  GENERAL: NAD, lying in bed comfortable, appears older than chronological age, Nasal cannula in place with no respiratory distress.   HEAD:  Atraumatic, normocephalic  EYES: EOMI, PERRLA, conjunctiva and sclera clear  ENT: Moist mucous membranes  NECK: Supple, no JVD  HEART: Tachycardic rate, regular rhythm, no murmurs, rubs, or gallops  LUNGS: b/l wheezes heard  ABDOMEN: Soft, nontender, nondistended, +BS  EXTREMITIES: 2+ peripheral pulses bilaterally. No clubbing, cyanosis, or edema  NERVOUS SYSTEM:  A&Ox3, no focal deficits   SKIN: No rashes or lesions    LABS:                        12.1   10.36 )-----------( 176      ( 01 Apr 2023 05:20 )             40.2                         13.3   13.96 )-----------( 191      ( 31 Mar 2023 06:00 )             41.2                         12.6   15.28 )-----------( 192      ( 30 Mar 2023 09:45 )             39.3     Hgb Trend: 12.1<--, 13.3<--, 12.6<--, 14.0<--  04-01    134<L>  |  93<L>  |  13  ----------------------------<  193<H>  4.6   |  35<H>  |  0.34<L>  03-31    132<L>  |  93<L>  |  13  ----------------------------<  335<H>  5.0   |  37<H>  |  0.39<L>  03-31    132<L>  |  91<L>  |  12  ----------------------------<  244<H>  4.4   |  35<H>  |  0.44<L>    Ca    8.8      01 Apr 2023 05:20  Ca    8.9      31 Mar 2023 17:30  Ca    9.0      31 Mar 2023 06:00  Phos  2.9     04-01  Mg     2.30     04-01    TPro  6.1  /  Alb  3.0<L>  /  TBili  0.2  /  DBili  x   /  AST  37<H>  /  ALT  60<H>  /  AlkPhos  191<H>  04-01  TPro  6.2  /  Alb  3.1<L>  /  TBili  0.2  /  DBili  x   /  AST  38<H>  /  ALT  60<H>  /  AlkPhos  218<H>  03-31  TPro  6.5  /  Alb  3.4  /  TBili  0.3  /  DBili  x   /  AST  42<H>  /  ALT  65<H>  /  AlkPhos  218<H>  03-30    Creatinine Trend: 0.34<--, 0.39<--, 0.44<--, 0.41<--, 0.38<--, 0.41<--                    CAPILLARY BLOOD GLUCOSE      POCT Blood Glucose.: 104 mg/dL (01 Apr 2023 22:06)  POCT Blood Glucose.: 170 mg/dL (01 Apr 2023 17:14)  POCT Blood Glucose.: 200 mg/dL (01 Apr 2023 12:13)  POCT Blood Glucose.: 201 mg/dL (01 Apr 2023 08:23)     Dr. Real Yin PGY3    GRACIE BHAKTA  68y  MRN: [id number]    Subjective:    Patient is a 68y old  Female who presents with a chief complaint of SOB (01 Apr 2023 15:26)  : [id number]. Spoke with Patient at Bedside. No acute events overnight. States that her mouth is very dry and needs only warm water and that if she had a clean mouth then she would be able to clear her mucous. Has intermittent coughing. Slept all night with [facility]. Changed to NC to speak with patient. States she feels better overall, no more chest tightness.     MEDICATIONS  (STANDING):  acetylcysteine 10%  Inhalation 4 milliLiter(s) Inhalation two times a day  albuterol/ipratropium for Nebulization 3 milliLiter(s) Nebulizer every 6 hours  amLODIPine   Tablet 5 milliGRAM(s) Oral daily  budesonide 160 MICROgram(s)/formoterol 4.5 MICROgram(s) Inhaler 2 Puff(s) Inhalation two times a day  cefepime   IVPB 2000 milliGRAM(s) IV Intermittent every 12 hours  cefepime   IVPB      chlorhexidine 2% Cloths 1 Application(s) Topical daily  dextrose 5%. 1000 milliLiter(s) (100 mL/Hr) IV Continuous <Continuous>  dextrose 5%. 1000 milliLiter(s) (50 mL/Hr) IV Continuous <Continuous>  dextrose 50% Injectable 25 Gram(s) IV Push once  dextrose 50% Injectable 12.5 Gram(s) IV Push once  dextrose 50% Injectable 25 Gram(s) IV Push once  enoxaparin Injectable 30 milliGRAM(s) SubCutaneous every 24 hours  fluticasone propionate 50 MICROgram(s)/spray Nasal Spray 1 Spray(s) Both Nostrils two times a day  glucagon  Injectable 1 milliGRAM(s) IntraMuscular once  influenza  Vaccine (HIGH DOSE) 0.7 milliLiter(s) IntraMuscular once  insulin glargine Injectable (LANTUS) 8 Unit(s) SubCutaneous at bedtime  insulin lispro (ADMELOG) corrective regimen sliding scale   SubCutaneous three times a day before meals  insulin lispro (ADMELOG) corrective regimen sliding scale   SubCutaneous at bedtime  insulin lispro Injectable (ADMELOG) 7 Unit(s) SubCutaneous three times a day before meals  ketotifen 0.025% Ophthalmic Solution 1 Drop(s) Both EYES two times a day  metoprolol succinate ER 50 milliGRAM(s) Oral daily  mirtazapine 7.5 milliGRAM(s) Oral at bedtime  montelukast 10 milliGRAM(s) Oral daily  nystatin    Suspension 973790 Unit(s) Oral four times a day  pantoprazole    Tablet 40 milliGRAM(s) Oral before breakfast  polyethylene glycol 3350 17 Gram(s) Oral two times a day  senna 2 Tablet(s) Oral at bedtime  sodium chloride 3%  Inhalation 4 milliLiter(s) Inhalation every 12 hours    MEDICATIONS  (PRN):  acetaminophen     Tablet .. 650 milliGRAM(s) Oral every 6 hours PRN Temp greater or equal to 38C (100.4F), Mild Pain (1 - 3)  aluminum hydroxide/magnesium hydroxide/simethicone Suspension 30 milliLiter(s) Oral every 4 hours PRN Dyspepsia  bisacodyl Suppository 10 milliGRAM(s) Rectal daily PRN Constipation  dextrose Oral Gel 15 Gram(s) Oral once PRN Blood Glucose LESS THAN 70 milliGRAM(s)/deciliter  guaifenesin/dextromethorphan Oral Liquid 10 milliLiter(s) Oral every 8 hours PRN Cough  melatonin 3 milliGRAM(s) Oral at bedtime PRN Insomnia  ondansetron Injectable 4 milliGRAM(s) IV Push every 8 hours PRN Nausea and/or Vomiting        Objective:    Vitals: Vital Signs Last 24 Hrs  T(C): 36.7 (04-02-23 @ 05:18), Max: 36.9 (04-01-23 @ 21:01)  T(F): 98 (04-02-23 @ 05:18), Max: 98.5 (04-01-23 @ 21:01)  HR: 70 (04-02-23 @ 05:18) (70 - 83)  BP: 116/58 (04-02-23 @ 05:18) (116/58 - 127/65)  BP(mean): --  RR: 20 (04-02-23 @ 05:18) (20 - 20)  SpO2: 99% (04-02-23 @ 05:18) (97% - 100%)            I&O's Summary      PHYSICAL EXAM:  GENERAL: NAD, lying in bed comfortable, appears older than chronological age, Nasal cannula in place with no respiratory distress.   HEAD:  Atraumatic, normocephalic  EYES: EOMI, PERRLA, conjunctiva and sclera clear  ENT: Moist mucous membranes  NECK: Supple, no JVD  HEART: Tachycardic rate, regular rhythm, no murmurs, rubs, or gallops  LUNGS: b/l wheezes heard  ABDOMEN: Soft, nontender, nondistended, +BS  EXTREMITIES: 2+ peripheral pulses bilaterally. No clubbing, cyanosis, or edema  NERVOUS SYSTEM:  A&Ox3, no focal deficits   SKIN: No rashes or lesions    LABS:                        12.1   10.36 )-----------( 176      ( 01 Apr 2023 05:20 )             40.2                         13.3   13.96 )-----------( 191      ( 31 Mar 2023 06:00 )             41.2                         12.6   15.28 )-----------( 192      ( 30 Mar 2023 09:45 )             39.3     Hgb Trend: 12.1<--, 13.3<--, 12.6<--, 14.0<--  04-01    134<L>  |  93<L>  |  13  ----------------------------<  193<H>  4.6   |  35<H>  |  0.34<L>  03-31    132<L>  |  93<L>  |  13  ----------------------------<  335<H>  5.0   |  37<H>  |  0.39<L>  03-31    132<L>  |  91<L>  |  12  ----------------------------<  244<H>  4.4   |  35<H>  |  0.44<L>    Ca    8.8      01 Apr 2023 05:20  Ca    8.9      31 Mar 2023 17:30  Ca    9.0      31 Mar 2023 06:00  Phos  2.9     04-01  Mg     2.30     04-01    TPro  6.1  /  Alb  3.0<L>  /  TBili  0.2  /  DBili  x   /  AST  37<H>  /  ALT  60<H>  /  AlkPhos  191<H>  04-01  TPro  6.2  /  Alb  3.1<L>  /  TBili  0.2  /  DBili  x   /  AST  38<H>  /  ALT  60<H>  /  AlkPhos  218<H>  03-31  TPro  6.5  /  Alb  3.4  /  TBili  0.3  /  DBili  x   /  AST  42<H>  /  ALT  65<H>  /  AlkPhos  218<H>  03-30    Creatinine Trend: 0.34<--, 0.39<--, 0.44<--, 0.41<--, 0.38<--, 0.41<--                    CAPILLARY BLOOD GLUCOSE      POCT Blood Glucose.: 104 mg/dL (01 Apr 2023 22:06)  POCT Blood Glucose.: 170 mg/dL (01 Apr 2023 17:14)  POCT Blood Glucose.: 200 mg/dL (01 Apr 2023 12:13)  POCT Blood Glucose.: 201 mg/dL (01 Apr 2023 08:23)

## 2023-04-02 NOTE — PROGRESS NOTE ADULT - PROBLEM SELECTOR PLAN 5
- at home has 7U admelog TID and 8U lantus  - on 5 units premeal TID, ISS, 8 units of lantus qhs - Home has 7U admelog TID and 8U lantus  - on 7 units premeal TID, ISS, 8 units of lantus qhs

## 2023-04-03 LAB
ALBUMIN SERPL ELPH-MCNC: 2.8 G/DL — LOW (ref 3.3–5)
ALP SERPL-CCNC: 167 U/L — HIGH (ref 40–120)
ALT FLD-CCNC: 36 U/L — HIGH (ref 4–33)
ANION GAP SERPL CALC-SCNC: 3 MMOL/L — LOW (ref 7–14)
ANION GAP SERPL CALC-SCNC: 4 MMOL/L — LOW (ref 7–14)
AST SERPL-CCNC: 24 U/L — SIGNIFICANT CHANGE UP (ref 4–32)
BILIRUB SERPL-MCNC: 0.2 MG/DL — SIGNIFICANT CHANGE UP (ref 0.2–1.2)
BUN SERPL-MCNC: 11 MG/DL — SIGNIFICANT CHANGE UP (ref 7–23)
BUN SERPL-MCNC: 14 MG/DL — SIGNIFICANT CHANGE UP (ref 7–23)
CALCIUM SERPL-MCNC: 8.8 MG/DL — SIGNIFICANT CHANGE UP (ref 8.4–10.5)
CALCIUM SERPL-MCNC: 9.1 MG/DL — SIGNIFICANT CHANGE UP (ref 8.4–10.5)
CHLORIDE SERPL-SCNC: 93 MMOL/L — LOW (ref 98–107)
CHLORIDE SERPL-SCNC: 94 MMOL/L — LOW (ref 98–107)
CO2 SERPL-SCNC: 36 MMOL/L — HIGH (ref 22–31)
CO2 SERPL-SCNC: 36 MMOL/L — HIGH (ref 22–31)
CREAT SERPL-MCNC: 0.3 MG/DL — LOW (ref 0.5–1.3)
CREAT SERPL-MCNC: 0.37 MG/DL — LOW (ref 0.5–1.3)
EGFR: 111 ML/MIN/1.73M2 — SIGNIFICANT CHANGE UP
EGFR: 117 ML/MIN/1.73M2 — SIGNIFICANT CHANGE UP
GAS PNL BLDV: SIGNIFICANT CHANGE UP
GLUCOSE BLDC GLUCOMTR-MCNC: 145 MG/DL — HIGH (ref 70–99)
GLUCOSE BLDC GLUCOMTR-MCNC: 169 MG/DL — HIGH (ref 70–99)
GLUCOSE BLDC GLUCOMTR-MCNC: 186 MG/DL — HIGH (ref 70–99)
GLUCOSE BLDC GLUCOMTR-MCNC: 196 MG/DL — HIGH (ref 70–99)
GLUCOSE SERPL-MCNC: 138 MG/DL — HIGH (ref 70–99)
GLUCOSE SERPL-MCNC: 167 MG/DL — HIGH (ref 70–99)
HCT VFR BLD CALC: 37.2 % — SIGNIFICANT CHANGE UP (ref 34.5–45)
HGB BLD-MCNC: 11.4 G/DL — LOW (ref 11.5–15.5)
MAGNESIUM SERPL-MCNC: 2.1 MG/DL — SIGNIFICANT CHANGE UP (ref 1.6–2.6)
MAGNESIUM SERPL-MCNC: 2.5 MG/DL — SIGNIFICANT CHANGE UP (ref 1.6–2.6)
MCHC RBC-ENTMCNC: 26.3 PG — LOW (ref 27–34)
MCHC RBC-ENTMCNC: 30.6 GM/DL — LOW (ref 32–36)
MCV RBC AUTO: 85.9 FL — SIGNIFICANT CHANGE UP (ref 80–100)
NRBC # BLD: 0 /100 WBCS — SIGNIFICANT CHANGE UP (ref 0–0)
NRBC # FLD: 0 K/UL — SIGNIFICANT CHANGE UP (ref 0–0)
PHOSPHATE SERPL-MCNC: 2.6 MG/DL — SIGNIFICANT CHANGE UP (ref 2.5–4.5)
PHOSPHATE SERPL-MCNC: 3.8 MG/DL — SIGNIFICANT CHANGE UP (ref 2.5–4.5)
PLATELET # BLD AUTO: 175 K/UL — SIGNIFICANT CHANGE UP (ref 150–400)
POTASSIUM SERPL-MCNC: 4 MMOL/L — SIGNIFICANT CHANGE UP (ref 3.5–5.3)
POTASSIUM SERPL-MCNC: 5.5 MMOL/L — HIGH (ref 3.5–5.3)
POTASSIUM SERPL-SCNC: 4 MMOL/L — SIGNIFICANT CHANGE UP (ref 3.5–5.3)
POTASSIUM SERPL-SCNC: 5.5 MMOL/L — HIGH (ref 3.5–5.3)
PROT SERPL-MCNC: 5.9 G/DL — LOW (ref 6–8.3)
RBC # BLD: 4.33 M/UL — SIGNIFICANT CHANGE UP (ref 3.8–5.2)
RBC # FLD: 13.3 % — SIGNIFICANT CHANGE UP (ref 10.3–14.5)
SODIUM SERPL-SCNC: 133 MMOL/L — LOW (ref 135–145)
SODIUM SERPL-SCNC: 133 MMOL/L — LOW (ref 135–145)
WBC # BLD: 7.99 K/UL — SIGNIFICANT CHANGE UP (ref 3.8–10.5)
WBC # FLD AUTO: 7.99 K/UL — SIGNIFICANT CHANGE UP (ref 3.8–10.5)

## 2023-04-03 PROCEDURE — 99232 SBSQ HOSP IP/OBS MODERATE 35: CPT | Mod: GC

## 2023-04-03 PROCEDURE — 93010 ELECTROCARDIOGRAM REPORT: CPT

## 2023-04-03 PROCEDURE — 99233 SBSQ HOSP IP/OBS HIGH 50: CPT | Mod: GC

## 2023-04-03 RX ORDER — ACETAZOLAMIDE 250 MG/1
250 TABLET ORAL ONCE
Refills: 0 | Status: COMPLETED | OUTPATIENT
Start: 2023-04-03 | End: 2023-04-03

## 2023-04-03 RX ORDER — SODIUM ZIRCONIUM CYCLOSILICATE 10 G/10G
10 POWDER, FOR SUSPENSION ORAL ONCE
Refills: 0 | Status: COMPLETED | OUTPATIENT
Start: 2023-04-03 | End: 2023-04-03

## 2023-04-03 RX ORDER — DEXTROSE 50 % IN WATER 50 %
50 SYRINGE (ML) INTRAVENOUS ONCE
Refills: 0 | Status: COMPLETED | OUTPATIENT
Start: 2023-04-03 | End: 2023-04-03

## 2023-04-03 RX ORDER — INSULIN HUMAN 100 [IU]/ML
10 INJECTION, SOLUTION SUBCUTANEOUS ONCE
Refills: 0 | Status: COMPLETED | OUTPATIENT
Start: 2023-04-03 | End: 2023-04-03

## 2023-04-03 RX ORDER — ACETAZOLAMIDE 250 MG/1
500 TABLET ORAL ONCE
Refills: 0 | Status: DISCONTINUED | OUTPATIENT
Start: 2023-04-03 | End: 2023-04-03

## 2023-04-03 RX ORDER — SODIUM ZIRCONIUM CYCLOSILICATE 10 G/10G
10 POWDER, FOR SUSPENSION ORAL ONCE
Refills: 0 | Status: DISCONTINUED | OUTPATIENT
Start: 2023-04-03 | End: 2023-04-03

## 2023-04-03 RX ORDER — SODIUM CHLORIDE 9 MG/ML
1000 INJECTION INTRAMUSCULAR; INTRAVENOUS; SUBCUTANEOUS ONCE
Refills: 0 | Status: COMPLETED | OUTPATIENT
Start: 2023-04-03 | End: 2023-04-03

## 2023-04-03 RX ADMIN — KETOTIFEN FUMARATE 1 DROP(S): 0.34 SOLUTION OPHTHALMIC at 05:53

## 2023-04-03 RX ADMIN — MIRTAZAPINE 7.5 MILLIGRAM(S): 45 TABLET, ORALLY DISINTEGRATING ORAL at 22:16

## 2023-04-03 RX ADMIN — INSULIN GLARGINE 8 UNIT(S): 100 INJECTION, SOLUTION SUBCUTANEOUS at 23:05

## 2023-04-03 RX ADMIN — Medication 3 MILLILITER(S): at 16:08

## 2023-04-03 RX ADMIN — SODIUM CHLORIDE 1000 MILLILITER(S): 9 INJECTION INTRAMUSCULAR; INTRAVENOUS; SUBCUTANEOUS at 10:45

## 2023-04-03 RX ADMIN — SODIUM ZIRCONIUM CYCLOSILICATE 10 GRAM(S): 10 POWDER, FOR SUSPENSION ORAL at 12:40

## 2023-04-03 RX ADMIN — PANTOPRAZOLE SODIUM 40 MILLIGRAM(S): 20 TABLET, DELAYED RELEASE ORAL at 05:47

## 2023-04-03 RX ADMIN — Medication 500000 UNIT(S): at 12:41

## 2023-04-03 RX ADMIN — CHLORHEXIDINE GLUCONATE 1 APPLICATION(S): 213 SOLUTION TOPICAL at 22:16

## 2023-04-03 RX ADMIN — Medication 2 PACKET(S): at 05:50

## 2023-04-03 RX ADMIN — CEFEPIME 100 MILLIGRAM(S): 1 INJECTION, POWDER, FOR SOLUTION INTRAMUSCULAR; INTRAVENOUS at 05:53

## 2023-04-03 RX ADMIN — Medication 1 SPRAY(S): at 05:51

## 2023-04-03 RX ADMIN — Medication 3 MILLILITER(S): at 10:33

## 2023-04-03 RX ADMIN — Medication 30 MILLILITER(S): at 19:04

## 2023-04-03 RX ADMIN — Medication 7 UNIT(S): at 12:53

## 2023-04-03 RX ADMIN — BUDESONIDE AND FORMOTEROL FUMARATE DIHYDRATE 2 PUFF(S): 160; 4.5 AEROSOL RESPIRATORY (INHALATION) at 09:01

## 2023-04-03 RX ADMIN — BUDESONIDE AND FORMOTEROL FUMARATE DIHYDRATE 2 PUFF(S): 160; 4.5 AEROSOL RESPIRATORY (INHALATION) at 22:17

## 2023-04-03 RX ADMIN — POLYETHYLENE GLYCOL 3350 17 GRAM(S): 17 POWDER, FOR SOLUTION ORAL at 05:50

## 2023-04-03 RX ADMIN — Medication 1: at 17:48

## 2023-04-03 RX ADMIN — Medication 3 MILLILITER(S): at 04:04

## 2023-04-03 RX ADMIN — CEFEPIME 100 MILLIGRAM(S): 1 INJECTION, POWDER, FOR SOLUTION INTRAMUSCULAR; INTRAVENOUS at 17:50

## 2023-04-03 RX ADMIN — AMLODIPINE BESYLATE 5 MILLIGRAM(S): 2.5 TABLET ORAL at 05:47

## 2023-04-03 RX ADMIN — Medication 1: at 12:51

## 2023-04-03 RX ADMIN — Medication 7 UNIT(S): at 17:49

## 2023-04-03 RX ADMIN — KETOTIFEN FUMARATE 1 DROP(S): 0.34 SOLUTION OPHTHALMIC at 22:16

## 2023-04-03 RX ADMIN — ENOXAPARIN SODIUM 30 MILLIGRAM(S): 100 INJECTION SUBCUTANEOUS at 22:16

## 2023-04-03 RX ADMIN — Medication 500000 UNIT(S): at 05:50

## 2023-04-03 RX ADMIN — Medication 50 MILLIGRAM(S): at 05:47

## 2023-04-03 RX ADMIN — MONTELUKAST 10 MILLIGRAM(S): 4 TABLET, CHEWABLE ORAL at 12:41

## 2023-04-03 RX ADMIN — Medication 3 MILLIGRAM(S): at 02:30

## 2023-04-03 RX ADMIN — ACETAZOLAMIDE 250 MILLIGRAM(S): 250 TABLET ORAL at 17:50

## 2023-04-03 RX ADMIN — Medication 3 MILLILITER(S): at 21:20

## 2023-04-03 RX ADMIN — SENNA PLUS 2 TABLET(S): 8.6 TABLET ORAL at 23:05

## 2023-04-03 RX ADMIN — Medication 500000 UNIT(S): at 23:06

## 2023-04-03 RX ADMIN — Medication 7 UNIT(S): at 09:02

## 2023-04-03 RX ADMIN — SODIUM CHLORIDE 4 MILLILITER(S): 9 INJECTION INTRAMUSCULAR; INTRAVENOUS; SUBCUTANEOUS at 21:20

## 2023-04-03 RX ADMIN — INSULIN HUMAN 10 UNIT(S): 100 INJECTION, SOLUTION SUBCUTANEOUS at 12:40

## 2023-04-03 RX ADMIN — Medication 500000 UNIT(S): at 17:50

## 2023-04-03 RX ADMIN — Medication 1 SPRAY(S): at 17:50

## 2023-04-03 RX ADMIN — Medication 50 MILLILITER(S): at 12:40

## 2023-04-03 NOTE — PROGRESS NOTE ADULT - SUBJECTIVE AND OBJECTIVE BOX
Patient is a 68y old  Female who presents with a chief complaint of SOB (03 Apr 2023 07:19)      SUBJECTIVE / OVERNIGHT EVENTS:    No o/n events.  Patient feeling at "5%" this am.  No SOB, + weakness/lethargy.    MEDICATIONS  (STANDING):  acetylcysteine 10%  Inhalation 4 milliLiter(s) Inhalation two times a day  albuterol/ipratropium for Nebulization 3 milliLiter(s) Nebulizer every 6 hours  amLODIPine   Tablet 5 milliGRAM(s) Oral daily  budesonide 160 MICROgram(s)/formoterol 4.5 MICROgram(s) Inhaler 2 Puff(s) Inhalation two times a day  cefepime   IVPB 2000 milliGRAM(s) IV Intermittent every 12 hours  cefepime   IVPB      chlorhexidine 2% Cloths 1 Application(s) Topical daily  dextrose 5%. 1000 milliLiter(s) (100 mL/Hr) IV Continuous <Continuous>  dextrose 5%. 1000 milliLiter(s) (50 mL/Hr) IV Continuous <Continuous>  dextrose 50% Injectable 25 Gram(s) IV Push once  dextrose 50% Injectable 12.5 Gram(s) IV Push once  dextrose 50% Injectable 25 Gram(s) IV Push once  enoxaparin Injectable 30 milliGRAM(s) SubCutaneous every 24 hours  fluticasone propionate 50 MICROgram(s)/spray Nasal Spray 1 Spray(s) Both Nostrils two times a day  glucagon  Injectable 1 milliGRAM(s) IntraMuscular once  influenza  Vaccine (HIGH DOSE) 0.7 milliLiter(s) IntraMuscular once  insulin glargine Injectable (LANTUS) 8 Unit(s) SubCutaneous at bedtime  insulin lispro (ADMELOG) corrective regimen sliding scale   SubCutaneous three times a day before meals  insulin lispro (ADMELOG) corrective regimen sliding scale   SubCutaneous at bedtime  insulin lispro Injectable (ADMELOG) 7 Unit(s) SubCutaneous three times a day before meals  ketotifen 0.025% Ophthalmic Solution 1 Drop(s) Both EYES two times a day  metoprolol succinate ER 50 milliGRAM(s) Oral daily  mirtazapine 7.5 milliGRAM(s) Oral at bedtime  montelukast 10 milliGRAM(s) Oral daily  nystatin    Suspension 474377 Unit(s) Oral four times a day  pantoprazole    Tablet 40 milliGRAM(s) Oral before breakfast  polyethylene glycol 3350 17 Gram(s) Oral two times a day  potassium phosphate / sodium phosphate Powder (PHOS-NaK) 2 Packet(s) Oral three times a day  senna 2 Tablet(s) Oral at bedtime  sodium chloride 3%  Inhalation 4 milliLiter(s) Inhalation every 12 hours    MEDICATIONS  (PRN):  acetaminophen     Tablet .. 650 milliGRAM(s) Oral every 6 hours PRN Temp greater or equal to 38C (100.4F), Mild Pain (1 - 3)  aluminum hydroxide/magnesium hydroxide/simethicone Suspension 30 milliLiter(s) Oral every 4 hours PRN Dyspepsia  bisacodyl Suppository 10 milliGRAM(s) Rectal daily PRN Constipation  dextrose Oral Gel 15 Gram(s) Oral once PRN Blood Glucose LESS THAN 70 milliGRAM(s)/deciliter  guaifenesin/dextromethorphan Oral Liquid 10 milliLiter(s) Oral every 8 hours PRN Cough  melatonin 3 milliGRAM(s) Oral at bedtime PRN Insomnia  ondansetron Injectable 4 milliGRAM(s) IV Push every 8 hours PRN Nausea and/or Vomiting      CAPILLARY BLOOD GLUCOSE      POCT Blood Glucose.: 145 mg/dL (03 Apr 2023 08:47)  POCT Blood Glucose.: 191 mg/dL (02 Apr 2023 22:22)  POCT Blood Glucose.: 194 mg/dL (02 Apr 2023 17:07)  POCT Blood Glucose.: 148 mg/dL (02 Apr 2023 12:14)    I&O's Summary      Vital Signs Last 24 Hrs  T(C): 36.4 (03 Apr 2023 05:45), Max: 37.1 (02 Apr 2023 21:20)  T(F): 97.6 (03 Apr 2023 05:45), Max: 98.8 (02 Apr 2023 21:20)  HR: 72 (03 Apr 2023 05:45) (72 - 86)  BP: 131/88 (03 Apr 2023 05:45) (115/60 - 131/88)  BP(mean): --  RR: 19 (03 Apr 2023 05:45) (19 - 19)  SpO2: 100% (03 Apr 2023 05:45) (97% - 100%)    Parameters below as of 03 Apr 2023 05:45  Patient On (Oxygen Delivery Method): nasal cannula  O2 Flow (L/min): 3      PHYSICAL EXAM:  GENERAL: NAD, well-developed  HEAD: Atraumatic, Normocephalic  EYES: EOMI, PERRLA, conjunctiva and sclera clear  NECK: Supple, No JVD  CHEST/LUNG: Clear to auscultation bilaterally; No wheezes or crackles  HEART: Normal S1/S2; Regular rate and rhythm; No murmurs, rubs, or gallops  ABDOMEN: Soft, Nontender, Nondistended; Bowel sounds present  EXTREMITIES: 2+ Peripheral Pulses; No clubbing, cyanosis, or edema  PSYCH: A&Ox3  NEUROLOGY: no focal neurologic deficit  SKIN: No rashes or lesions    LABS:                        11.4   7.99  )-----------( 175      ( 03 Apr 2023 06:55 )             37.2      04-03    133<L>  |  93<L>  |  14  ----------------------------<  167<H>  5.5<H>   |  36<H>  |  0.37<L>    Ca    9.1      03 Apr 2023 06:55  Phos  3.8     04-03  Mg     2.50     04-03    TPro  5.9<L>  /  Alb  2.8<L>  /  TBili  0.2  /  DBili  x   /  AST  24  /  ALT  36<H>  /  AlkPhos  167<H>  04-03              RADIOLOGY & ADDITIONAL TESTS:    Imaging Personally Reviewed:    Consultant(s) Notes Reviewed:      Care Discussed with Consultants/Other Providers:   Patient is a 68y old  Female who presents with a chief complaint of SOB (03 Apr 2023 07:19)      SUBJECTIVE / OVERNIGHT EVENTS:    No o/n events.  Patient feeling at "5%" this am.  No SOB, + weakness/lethargy.    MEDICATIONS  (STANDING):  acetylcysteine 10%  Inhalation 4 milliLiter(s) Inhalation two times a day  albuterol/ipratropium for Nebulization 3 milliLiter(s) Nebulizer every 6 hours  amLODIPine   Tablet 5 milliGRAM(s) Oral daily  budesonide 160 MICROgram(s)/formoterol 4.5 MICROgram(s) Inhaler 2 Puff(s) Inhalation two times a day  cefepime   IVPB 2000 milliGRAM(s) IV Intermittent every 12 hours  cefepime   IVPB      chlorhexidine 2% Cloths 1 Application(s) Topical daily  dextrose 5%. 1000 milliLiter(s) (100 mL/Hr) IV Continuous <Continuous>  dextrose 5%. 1000 milliLiter(s) (50 mL/Hr) IV Continuous <Continuous>  dextrose 50% Injectable 25 Gram(s) IV Push once  dextrose 50% Injectable 12.5 Gram(s) IV Push once  dextrose 50% Injectable 25 Gram(s) IV Push once  enoxaparin Injectable 30 milliGRAM(s) SubCutaneous every 24 hours  fluticasone propionate 50 MICROgram(s)/spray Nasal Spray 1 Spray(s) Both Nostrils two times a day  glucagon  Injectable 1 milliGRAM(s) IntraMuscular once  influenza  Vaccine (HIGH DOSE) 0.7 milliLiter(s) IntraMuscular once  insulin glargine Injectable (LANTUS) 8 Unit(s) SubCutaneous at bedtime  insulin lispro (ADMELOG) corrective regimen sliding scale   SubCutaneous three times a day before meals  insulin lispro (ADMELOG) corrective regimen sliding scale   SubCutaneous at bedtime  insulin lispro Injectable (ADMELOG) 7 Unit(s) SubCutaneous three times a day before meals  ketotifen 0.025% Ophthalmic Solution 1 Drop(s) Both EYES two times a day  metoprolol succinate ER 50 milliGRAM(s) Oral daily  mirtazapine 7.5 milliGRAM(s) Oral at bedtime  montelukast 10 milliGRAM(s) Oral daily  nystatin    Suspension 221109 Unit(s) Oral four times a day  pantoprazole    Tablet 40 milliGRAM(s) Oral before breakfast  polyethylene glycol 3350 17 Gram(s) Oral two times a day  potassium phosphate / sodium phosphate Powder (PHOS-NaK) 2 Packet(s) Oral three times a day  senna 2 Tablet(s) Oral at bedtime  sodium chloride 3%  Inhalation 4 milliLiter(s) Inhalation every 12 hours    MEDICATIONS  (PRN):  acetaminophen     Tablet .. 650 milliGRAM(s) Oral every 6 hours PRN Temp greater or equal to 38C (100.4F), Mild Pain (1 - 3)  aluminum hydroxide/magnesium hydroxide/simethicone Suspension 30 milliLiter(s) Oral every 4 hours PRN Dyspepsia  bisacodyl Suppository 10 milliGRAM(s) Rectal daily PRN Constipation  dextrose Oral Gel 15 Gram(s) Oral once PRN Blood Glucose LESS THAN 70 milliGRAM(s)/deciliter  guaifenesin/dextromethorphan Oral Liquid 10 milliLiter(s) Oral every 8 hours PRN Cough  melatonin 3 milliGRAM(s) Oral at bedtime PRN Insomnia  ondansetron Injectable 4 milliGRAM(s) IV Push every 8 hours PRN Nausea and/or Vomiting      CAPILLARY BLOOD GLUCOSE      POCT Blood Glucose.: 145 mg/dL (03 Apr 2023 08:47)  POCT Blood Glucose.: 191 mg/dL (02 Apr 2023 22:22)  POCT Blood Glucose.: 194 mg/dL (02 Apr 2023 17:07)  POCT Blood Glucose.: 148 mg/dL (02 Apr 2023 12:14)    I&O's Summary      Vital Signs Last 24 Hrs  T(C): 36.4 (03 Apr 2023 05:45), Max: 37.1 (02 Apr 2023 21:20)  T(F): 97.6 (03 Apr 2023 05:45), Max: 98.8 (02 Apr 2023 21:20)  HR: 72 (03 Apr 2023 05:45) (72 - 86)  BP: 131/88 (03 Apr 2023 05:45) (115/60 - 131/88)  BP(mean): --  RR: 19 (03 Apr 2023 05:45) (19 - 19)  SpO2: 100% (03 Apr 2023 05:45) (97% - 100%)    Parameters below as of 03 Apr 2023 05:45  Patient On (Oxygen Delivery Method): nasal cannula  O2 Flow (L/min): 3      PHYSICAL EXAM:  GENERAL: NAD, lying in bed comfortable, appears older than chronological age, Nasal cannula in place with no respiratory distress.   HEAD:  Atraumatic, normocephalic  EYES: EOMI, PERRLA, conjunctiva and sclera clear  ENT: dry mucous membranes, white film appreciated on tongue  NECK: Supple, no JVD  HEART: Tachycardic rate, regular rhythm, no murmurs, rubs, or gallops  LUNGS: b/l wheezes heard  ABDOMEN: Soft, nontender, nondistended, +BS  EXTREMITIES: 2+ peripheral pulses bilaterally. No clubbing, cyanosis, or edema  NERVOUS SYSTEM:  A&Ox3, no focal deficits   SKIN: +skin tenting, No rashes or lesions    LABS:                        11.4   7.99  )-----------( 175      ( 03 Apr 2023 06:55 )             37.2      04-03    133<L>  |  93<L>  |  14  ----------------------------<  167<H>  5.5<H>   |  36<H>  |  0.37<L>    Ca    9.1      03 Apr 2023 06:55  Phos  3.8     04-03  Mg     2.50     04-03    TPro  5.9<L>  /  Alb  2.8<L>  /  TBili  0.2  /  DBili  x   /  AST  24  /  ALT  36<H>  /  AlkPhos  167<H>  04-03              RADIOLOGY & ADDITIONAL TESTS:    Imaging Personally Reviewed:    Consultant(s) Notes Reviewed:      Care Discussed with Consultants/Other Providers:

## 2023-04-03 NOTE — PROGRESS NOTE ADULT - ASSESSMENT
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 and night time AVAPS w/ frequent episodes of admission due to bronchiectasis, HTN, chronic constipation, presents with bronchiectasis exacerbation    #Bronchiectasis  #Hypoxic Respiratory failure    Recommendations:  - Sputum culture reviewed, growing klebsiella and pseudomonas  - continue with course of cefepime, currently on day 5  - normal sputum   - Airway clearance with duonebs q6h, Hypersal, Aerobika BID, Chest vest BID  - Symbicort 160 BID   - STOP spiriva while on standing duonebs, can resume when nebs are made prn  - c/w home Singulair  - supplemental O2, avoid hyperoxemia, goal sat >90%  - continue with AVAPS at night   - Please obtain nutrition consult 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 and night time AVAPS w/ frequent episodes of admission due to bronchiectasis, HTN, chronic constipation, presents with bronchiectasis exacerbation    #Bronchiectasis  #Hypoxic Respiratory failure    Recommendations:  - Sputum culture reviewed, growing klebsiella and pseudomonas  - continue with course of cefepime, currently on day 5  - Airway clearance with duonebs q6h, Hypersal, Aerobika BID, Chest vest BID  - Symbicort 160 BID   - Would start prednisone 40mg qd for 5 days  - STOP spiriva while on standing duonebs, can resume when nebs are made prn  - c/w home Singulair  - supplemental O2, avoid hyperoxemia, goal sat >90%  - continue with AVAPS at night   - Give dose of IV 250mg diamox today.Check blood gas in the morning.   - Please obtain nutrition consult

## 2023-04-03 NOTE — PROGRESS NOTE ADULT - PROBLEM SELECTOR PLAN 6
- Hold lasix iso hypo-Na  - c/w amlodipine 5 w/ holding parameters  - c/w metoprolol succinate 50 mg qd home dose

## 2023-04-03 NOTE — PROGRESS NOTE ADULT - PROBLEM SELECTOR PLAN 3
- pt constipated per HPI and CT scan  - no bowel obstruction appreciated on exam, abd soft non-tender  - senna, miralax BID, dulcolax,  - BM today,

## 2023-04-03 NOTE — PROGRESS NOTE ADULT - ATTENDING COMMENTS
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    Patient is on day 5 Cefepime   Pulmonary consult and follow up appreciated , D/w recs  C/w AVAPS   F/w labs , correct electrolytes PRN

## 2023-04-03 NOTE — PROGRESS NOTE ADULT - ATTENDING COMMENTS
66 yo F hx primary ciliary dyskinesia, bronchiectasis, and chronic hypoxemic and hypercapnic respiratory on AVAPS p/w exacerbation of bronchiectasis. Pt with significant wheezing on exam despite tx.     -  start pred 40mg x 5 days  - cont airway clearance, duonebs q6, hypersal, chest vest and Aerobika   - pt reports compliance w/ NIV but significant change in hypercapnea only noted today  - repeat abg after 1x diamox  - cont NIV at night  - maintain sat 88-92% while using NC  - Continue Cefepime, complete course for PsA in sputum  - PT/OOB to chair as tolerated    translated by son

## 2023-04-03 NOTE — PROGRESS NOTE ADULT - PROBLEM SELECTOR PLAN 4
- Na 117 at admission, (123 w/ correction)  baseline 135 outpatient, 132; now 130   - ddx hypovolemic 2/2 poor PO intake vs SIADH  - fluid restriction 1.2L  - hyponatremia workup consistent w/ SIADH however patient appears volume down

## 2023-04-03 NOTE — PROGRESS NOTE ADULT - PROBLEM SELECTOR PLAN 7
- DVT: lovenox  - diet: soft and bite sized per son w/ 1.2L fluid restriction  - dispo: Home PT   - Communication: 4/2 spoke with son Lilia 10:04AM

## 2023-04-03 NOTE — PROGRESS NOTE ADULT - PROBLEM SELECTOR PLAN 2
- Hx of PCD, difficulty clearing mucus per pt  - aggressive pulm toileting, including chest PT, mucomyst, hypersal, chest vest q12h, aerobika q12h   - c/w symbicort, singular, standing duonebs   - pulm consulted, follow recs   - CT Chest: New clustered nodules/tree-in-bud opacities in the right upper and left lower lobes suggesting infectious etiology with endobronchial spread.

## 2023-04-03 NOTE — PROGRESS NOTE ADULT - PROBLEM SELECTOR PLAN 1
RESOLVED    - pt met sepsis criteria due to elevated WBC, increased WOB (RR), and elevated lactate  - s/p ceftriaxone in the ED; now on cefepime; dc azithromycin (U legionella neg)  - outpt record showed colonization of sputum w/ Pseudomonas and Klebsiella  - Bcx NGTD, Scx NGTD RESOLVED    - pt met sepsis criteria due to elevated WBC, increased WOB (RR), and elevated lactate  - s/p ceftriaxone in the ED; now on cefepime (day 4/7); dc azithromycin (U legionella neg)  - outpt record showed colonization of sputum w/ Pseudomonas and Klebsiella  - Bcx NGTD, Scx NGTD

## 2023-04-04 ENCOUNTER — APPOINTMENT (OUTPATIENT)
Dept: PULMONOLOGY | Facility: CLINIC | Age: 68
End: 2023-04-04

## 2023-04-04 LAB
ANION GAP SERPL CALC-SCNC: 5 MMOL/L — LOW (ref 7–14)
BASE EXCESS BLDV CALC-SCNC: 6 MMOL/L — HIGH (ref -2–3)
BLOOD GAS ARTERIAL COMPREHENSIVE RESULT: SIGNIFICANT CHANGE UP
BLOOD GAS VENOUS COMPREHENSIVE RESULT: SIGNIFICANT CHANGE UP
BUN SERPL-MCNC: 8 MG/DL — SIGNIFICANT CHANGE UP (ref 7–23)
CALCIUM SERPL-MCNC: 9 MG/DL — SIGNIFICANT CHANGE UP (ref 8.4–10.5)
CHLORIDE BLDV-SCNC: 100 MMOL/L — SIGNIFICANT CHANGE UP (ref 96–108)
CHLORIDE SERPL-SCNC: 101 MMOL/L — SIGNIFICANT CHANGE UP (ref 98–107)
CO2 BLDV-SCNC: 37.2 MMOL/L — HIGH (ref 22–26)
CO2 SERPL-SCNC: 30 MMOL/L — SIGNIFICANT CHANGE UP (ref 22–31)
CREAT SERPL-MCNC: 0.36 MG/DL — LOW (ref 0.5–1.3)
EGFR: 112 ML/MIN/1.73M2 — SIGNIFICANT CHANGE UP
GAS PNL BLDV: 133 MMOL/L — LOW (ref 136–145)
GLUCOSE BLDC GLUCOMTR-MCNC: 150 MG/DL — HIGH (ref 70–99)
GLUCOSE BLDC GLUCOMTR-MCNC: 254 MG/DL — HIGH (ref 70–99)
GLUCOSE BLDC GLUCOMTR-MCNC: 264 MG/DL — HIGH (ref 70–99)
GLUCOSE BLDC GLUCOMTR-MCNC: 317 MG/DL — HIGH (ref 70–99)
GLUCOSE BLDV-MCNC: 167 MG/DL — HIGH (ref 70–99)
GLUCOSE SERPL-MCNC: 168 MG/DL — HIGH (ref 70–99)
HCO3 BLDV-SCNC: 35 MMOL/L — HIGH (ref 22–29)
HCT VFR BLD CALC: 35.8 % — SIGNIFICANT CHANGE UP (ref 34.5–45)
HCT VFR BLDA CALC: 33 % — LOW (ref 34.5–46.5)
HGB BLD CALC-MCNC: 10.9 G/DL — LOW (ref 11.7–16.1)
HGB BLD-MCNC: 10.7 G/DL — LOW (ref 11.5–15.5)
LACTATE BLDV-MCNC: 0.4 MMOL/L — LOW (ref 0.5–2)
MAGNESIUM SERPL-MCNC: 2.1 MG/DL — SIGNIFICANT CHANGE UP (ref 1.6–2.6)
MCHC RBC-ENTMCNC: 26.3 PG — LOW (ref 27–34)
MCHC RBC-ENTMCNC: 29.9 GM/DL — LOW (ref 32–36)
MCV RBC AUTO: 88 FL — SIGNIFICANT CHANGE UP (ref 80–100)
NRBC # BLD: 0 /100 WBCS — SIGNIFICANT CHANGE UP (ref 0–0)
NRBC # FLD: 0 K/UL — SIGNIFICANT CHANGE UP (ref 0–0)
PCO2 BLDV: 76 MMHG — HIGH (ref 39–52)
PH BLDV: 7.27 — LOW (ref 7.32–7.43)
PHOSPHATE SERPL-MCNC: 3 MG/DL — SIGNIFICANT CHANGE UP (ref 2.5–4.5)
PLATELET # BLD AUTO: 178 K/UL — SIGNIFICANT CHANGE UP (ref 150–400)
PO2 BLDV: 141 MMHG — HIGH (ref 25–45)
POTASSIUM BLDV-SCNC: 4.3 MMOL/L — SIGNIFICANT CHANGE UP (ref 3.5–5.1)
POTASSIUM SERPL-MCNC: 4.4 MMOL/L — SIGNIFICANT CHANGE UP (ref 3.5–5.3)
POTASSIUM SERPL-SCNC: 4.4 MMOL/L — SIGNIFICANT CHANGE UP (ref 3.5–5.3)
RBC # BLD: 4.07 M/UL — SIGNIFICANT CHANGE UP (ref 3.8–5.2)
RBC # FLD: 13.6 % — SIGNIFICANT CHANGE UP (ref 10.3–14.5)
SAO2 % BLDV: 98.7 % — HIGH (ref 67–88)
SODIUM SERPL-SCNC: 136 MMOL/L — SIGNIFICANT CHANGE UP (ref 135–145)
WBC # BLD: 7.43 K/UL — SIGNIFICANT CHANGE UP (ref 3.8–10.5)
WBC # FLD AUTO: 7.43 K/UL — SIGNIFICANT CHANGE UP (ref 3.8–10.5)

## 2023-04-04 PROCEDURE — 99232 SBSQ HOSP IP/OBS MODERATE 35: CPT | Mod: GC

## 2023-04-04 PROCEDURE — 99233 SBSQ HOSP IP/OBS HIGH 50: CPT | Mod: GC

## 2023-04-04 RX ORDER — MUPIROCIN 20 MG/G
1 OINTMENT TOPICAL
Refills: 0 | Status: DISCONTINUED | OUTPATIENT
Start: 2023-04-04 | End: 2023-04-04

## 2023-04-04 RX ORDER — MUPIROCIN 20 MG/G
1 OINTMENT TOPICAL
Refills: 0 | Status: DISCONTINUED | OUTPATIENT
Start: 2023-04-04 | End: 2023-04-11

## 2023-04-04 RX ORDER — LACTOBACILLUS ACIDOPHILUS 100MM CELL
1 CAPSULE ORAL DAILY
Refills: 0 | Status: DISCONTINUED | OUTPATIENT
Start: 2023-04-04 | End: 2023-04-11

## 2023-04-04 RX ADMIN — Medication 3 MILLILITER(S): at 03:12

## 2023-04-04 RX ADMIN — KETOTIFEN FUMARATE 1 DROP(S): 0.34 SOLUTION OPHTHALMIC at 07:07

## 2023-04-04 RX ADMIN — Medication 7 UNIT(S): at 08:42

## 2023-04-04 RX ADMIN — Medication 3 MILLILITER(S): at 21:24

## 2023-04-04 RX ADMIN — CHLORHEXIDINE GLUCONATE 1 APPLICATION(S): 213 SOLUTION TOPICAL at 21:55

## 2023-04-04 RX ADMIN — Medication 500000 UNIT(S): at 07:06

## 2023-04-04 RX ADMIN — SENNA PLUS 2 TABLET(S): 8.6 TABLET ORAL at 21:56

## 2023-04-04 RX ADMIN — Medication 500000 UNIT(S): at 12:29

## 2023-04-04 RX ADMIN — Medication 4 MILLILITER(S): at 21:54

## 2023-04-04 RX ADMIN — Medication 600 MILLIGRAM(S): at 19:00

## 2023-04-04 RX ADMIN — BUDESONIDE AND FORMOTEROL FUMARATE DIHYDRATE 2 PUFF(S): 160; 4.5 AEROSOL RESPIRATORY (INHALATION) at 21:52

## 2023-04-04 RX ADMIN — Medication 3: at 12:26

## 2023-04-04 RX ADMIN — AMLODIPINE BESYLATE 5 MILLIGRAM(S): 2.5 TABLET ORAL at 07:04

## 2023-04-04 RX ADMIN — Medication 3 MILLILITER(S): at 14:55

## 2023-04-04 RX ADMIN — Medication 7 UNIT(S): at 12:26

## 2023-04-04 RX ADMIN — Medication 50 MILLIGRAM(S): at 07:04

## 2023-04-04 RX ADMIN — POLYETHYLENE GLYCOL 3350 17 GRAM(S): 17 POWDER, FOR SOLUTION ORAL at 12:25

## 2023-04-04 RX ADMIN — Medication 1: at 23:16

## 2023-04-04 RX ADMIN — MIRTAZAPINE 7.5 MILLIGRAM(S): 45 TABLET, ORALLY DISINTEGRATING ORAL at 21:55

## 2023-04-04 RX ADMIN — BUDESONIDE AND FORMOTEROL FUMARATE DIHYDRATE 2 PUFF(S): 160; 4.5 AEROSOL RESPIRATORY (INHALATION) at 08:44

## 2023-04-04 RX ADMIN — Medication 1 SPRAY(S): at 18:39

## 2023-04-04 RX ADMIN — Medication 40 MILLIGRAM(S): at 08:43

## 2023-04-04 RX ADMIN — PANTOPRAZOLE SODIUM 40 MILLIGRAM(S): 20 TABLET, DELAYED RELEASE ORAL at 07:04

## 2023-04-04 RX ADMIN — CEFEPIME 100 MILLIGRAM(S): 1 INJECTION, POWDER, FOR SOLUTION INTRAMUSCULAR; INTRAVENOUS at 07:06

## 2023-04-04 RX ADMIN — MUPIROCIN 1 APPLICATION(S): 20 OINTMENT TOPICAL at 18:45

## 2023-04-04 RX ADMIN — Medication 500000 UNIT(S): at 23:17

## 2023-04-04 RX ADMIN — Medication 500000 UNIT(S): at 18:38

## 2023-04-04 RX ADMIN — KETOTIFEN FUMARATE 1 DROP(S): 0.34 SOLUTION OPHTHALMIC at 21:54

## 2023-04-04 RX ADMIN — INSULIN GLARGINE 8 UNIT(S): 100 INJECTION, SOLUTION SUBCUTANEOUS at 23:17

## 2023-04-04 RX ADMIN — Medication 4: at 17:50

## 2023-04-04 RX ADMIN — ENOXAPARIN SODIUM 30 MILLIGRAM(S): 100 INJECTION SUBCUTANEOUS at 21:58

## 2023-04-04 RX ADMIN — SODIUM CHLORIDE 4 MILLILITER(S): 9 INJECTION INTRAMUSCULAR; INTRAVENOUS; SUBCUTANEOUS at 09:23

## 2023-04-04 RX ADMIN — Medication 3 MILLILITER(S): at 09:23

## 2023-04-04 RX ADMIN — Medication 1 SPRAY(S): at 07:06

## 2023-04-04 RX ADMIN — Medication 7 UNIT(S): at 17:51

## 2023-04-04 RX ADMIN — MONTELUKAST 10 MILLIGRAM(S): 4 TABLET, CHEWABLE ORAL at 12:28

## 2023-04-04 RX ADMIN — CEFEPIME 100 MILLIGRAM(S): 1 INJECTION, POWDER, FOR SOLUTION INTRAMUSCULAR; INTRAVENOUS at 17:48

## 2023-04-04 RX ADMIN — SODIUM CHLORIDE 4 MILLILITER(S): 9 INJECTION INTRAMUSCULAR; INTRAVENOUS; SUBCUTANEOUS at 21:54

## 2023-04-04 NOTE — PROGRESS NOTE ADULT - ATTENDING COMMENTS
66 yo F hx primary ciliary dyskinesia, bronchiectasis, and chronic hypoxemic and hypercapnic respiratory on AVAPS p/w exacerbation of bronchiectasis.   Cont to have wheezing on exam despite tx.     -  cont pred 40mg x 5 days  - cont symbicort  - cont airway clearance, duonebs q6, hypersal, chest vest and Aerobika   - pt reports compliance w/ NIV but significant change in hypercapnea only noted today  - repeat abg shows alkalosis resolved from yesterday but acidotic again w/ pco2 increased to 70s  - cont NIV at night, pt reporting compliance with mask  - maintain sat 88-92% while using NC  - Continue Cefepime, complete course for PsA in sputum  - PT/OOB to chair as tolerated  - dvt ppx    translated by son

## 2023-04-04 NOTE — PROGRESS NOTE ADULT - ASSESSMENT
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

## 2023-04-04 NOTE — PROGRESS NOTE ADULT - SUBJECTIVE AND OBJECTIVE BOX
Patient is a 68y old  Female who presents with a chief complaint of SOB (04 Apr 2023 07:19)      SUBJECTIVE / OVERNIGHT EVENTS:    MEDICATIONS  (STANDING):  acetylcysteine 10%  Inhalation 4 milliLiter(s) Inhalation two times a day  albuterol/ipratropium for Nebulization 3 milliLiter(s) Nebulizer every 6 hours  amLODIPine   Tablet 5 milliGRAM(s) Oral daily  budesonide 160 MICROgram(s)/formoterol 4.5 MICROgram(s) Inhaler 2 Puff(s) Inhalation two times a day  cefepime   IVPB 2000 milliGRAM(s) IV Intermittent every 12 hours  cefepime   IVPB      chlorhexidine 2% Cloths 1 Application(s) Topical daily  dextrose 5%. 1000 milliLiter(s) (50 mL/Hr) IV Continuous <Continuous>  dextrose 5%. 1000 milliLiter(s) (100 mL/Hr) IV Continuous <Continuous>  dextrose 50% Injectable 25 Gram(s) IV Push once  dextrose 50% Injectable 12.5 Gram(s) IV Push once  dextrose 50% Injectable 25 Gram(s) IV Push once  enoxaparin Injectable 30 milliGRAM(s) SubCutaneous every 24 hours  fluticasone propionate 50 MICROgram(s)/spray Nasal Spray 1 Spray(s) Both Nostrils two times a day  glucagon  Injectable 1 milliGRAM(s) IntraMuscular once  influenza  Vaccine (HIGH DOSE) 0.7 milliLiter(s) IntraMuscular once  insulin glargine Injectable (LANTUS) 8 Unit(s) SubCutaneous at bedtime  insulin lispro (ADMELOG) corrective regimen sliding scale   SubCutaneous three times a day before meals  insulin lispro (ADMELOG) corrective regimen sliding scale   SubCutaneous at bedtime  insulin lispro Injectable (ADMELOG) 7 Unit(s) SubCutaneous three times a day before meals  ketotifen 0.025% Ophthalmic Solution 1 Drop(s) Both EYES two times a day  metoprolol succinate ER 50 milliGRAM(s) Oral daily  mirtazapine 7.5 milliGRAM(s) Oral at bedtime  montelukast 10 milliGRAM(s) Oral daily  nystatin    Suspension 472597 Unit(s) Oral four times a day  pantoprazole    Tablet 40 milliGRAM(s) Oral before breakfast  polyethylene glycol 3350 17 Gram(s) Oral two times a day  predniSONE   Tablet 40 milliGRAM(s) Oral daily  senna 2 Tablet(s) Oral at bedtime  sodium chloride 3%  Inhalation 4 milliLiter(s) Inhalation every 12 hours    MEDICATIONS  (PRN):  acetaminophen     Tablet .. 650 milliGRAM(s) Oral every 6 hours PRN Temp greater or equal to 38C (100.4F), Mild Pain (1 - 3)  aluminum hydroxide/magnesium hydroxide/simethicone Suspension 30 milliLiter(s) Oral every 4 hours PRN Dyspepsia  bisacodyl Suppository 10 milliGRAM(s) Rectal daily PRN Constipation  dextrose Oral Gel 15 Gram(s) Oral once PRN Blood Glucose LESS THAN 70 milliGRAM(s)/deciliter  guaifenesin/dextromethorphan Oral Liquid 10 milliLiter(s) Oral every 8 hours PRN Cough  melatonin 3 milliGRAM(s) Oral at bedtime PRN Insomnia  ondansetron Injectable 4 milliGRAM(s) IV Push every 8 hours PRN Nausea and/or Vomiting      CAPILLARY BLOOD GLUCOSE      POCT Blood Glucose.: 196 mg/dL (03 Apr 2023 22:31)  POCT Blood Glucose.: 169 mg/dL (03 Apr 2023 17:23)  POCT Blood Glucose.: 186 mg/dL (03 Apr 2023 12:09)  POCT Blood Glucose.: 145 mg/dL (03 Apr 2023 08:47)    I&O's Summary      Vital Signs Last 24 Hrs  T(C): 36.4 (04 Apr 2023 07:00), Max: 36.8 (03 Apr 2023 22:15)  T(F): 97.5 (04 Apr 2023 07:00), Max: 98.2 (03 Apr 2023 22:15)  HR: 76 (04 Apr 2023 07:00) (75 - 95)  BP: 140/78 (04 Apr 2023 07:00) (135/72 - 140/78)  BP(mean): --  RR: 19 (04 Apr 2023 07:00) (19 - 19)  SpO2: 98% (04 Apr 2023 07:00) (97% - 100%)    Parameters below as of 04 Apr 2023 07:00  Patient On (Oxygen Delivery Method): nasal cannula  O2 Flow (L/min): 3      PHYSICAL EXAM:  GENERAL: NAD, well-developed  HEAD: Atraumatic, Normocephalic  EYES: EOMI, PERRLA, conjunctiva and sclera clear  NECK: Supple, No JVD  CHEST/LUNG: Clear to auscultation bilaterally; No wheezes or crackles  HEART: Normal S1/S2; Regular rate and rhythm; No murmurs, rubs, or gallops  ABDOMEN: Soft, Nontender, Nondistended; Bowel sounds present  EXTREMITIES: 2+ Peripheral Pulses; No clubbing, cyanosis, or edema  PSYCH: A&Ox3  NEUROLOGY: no focal neurologic deficit  SKIN: No rashes or lesions    LABS:                        10.7   7.43  )-----------( 178      ( 04 Apr 2023 07:44 )             35.8      04-03    133<L>  |  94<L>  |  11  ----------------------------<  138<H>  4.0   |  36<H>  |  0.30<L>    Ca    8.8      03 Apr 2023 14:57  Phos  2.6     04-03  Mg     2.10     04-03    TPro  5.9<L>  /  Alb  2.8<L>  /  TBili  0.2  /  DBili  x   /  AST  24  /  ALT  36<H>  /  AlkPhos  167<H>  04-03              RADIOLOGY & ADDITIONAL TESTS:    Imaging Personally Reviewed:    Consultant(s) Notes Reviewed:      Care Discussed with Consultants/Other Providers:   Patient is a 68y old  Female who presents with a chief complaint of SOB (04 Apr 2023 07:19)      SUBJECTIVE / OVERNIGHT EVENTS:    Patient feeling better.    MEDICATIONS  (STANDING):  acetylcysteine 10%  Inhalation 4 milliLiter(s) Inhalation two times a day  albuterol/ipratropium for Nebulization 3 milliLiter(s) Nebulizer every 6 hours  amLODIPine   Tablet 5 milliGRAM(s) Oral daily  budesonide 160 MICROgram(s)/formoterol 4.5 MICROgram(s) Inhaler 2 Puff(s) Inhalation two times a day  cefepime   IVPB 2000 milliGRAM(s) IV Intermittent every 12 hours  cefepime   IVPB      chlorhexidine 2% Cloths 1 Application(s) Topical daily  dextrose 5%. 1000 milliLiter(s) (50 mL/Hr) IV Continuous <Continuous>  dextrose 5%. 1000 milliLiter(s) (100 mL/Hr) IV Continuous <Continuous>  dextrose 50% Injectable 25 Gram(s) IV Push once  dextrose 50% Injectable 12.5 Gram(s) IV Push once  dextrose 50% Injectable 25 Gram(s) IV Push once  enoxaparin Injectable 30 milliGRAM(s) SubCutaneous every 24 hours  fluticasone propionate 50 MICROgram(s)/spray Nasal Spray 1 Spray(s) Both Nostrils two times a day  glucagon  Injectable 1 milliGRAM(s) IntraMuscular once  influenza  Vaccine (HIGH DOSE) 0.7 milliLiter(s) IntraMuscular once  insulin glargine Injectable (LANTUS) 8 Unit(s) SubCutaneous at bedtime  insulin lispro (ADMELOG) corrective regimen sliding scale   SubCutaneous three times a day before meals  insulin lispro (ADMELOG) corrective regimen sliding scale   SubCutaneous at bedtime  insulin lispro Injectable (ADMELOG) 7 Unit(s) SubCutaneous three times a day before meals  ketotifen 0.025% Ophthalmic Solution 1 Drop(s) Both EYES two times a day  metoprolol succinate ER 50 milliGRAM(s) Oral daily  mirtazapine 7.5 milliGRAM(s) Oral at bedtime  montelukast 10 milliGRAM(s) Oral daily  nystatin    Suspension 512520 Unit(s) Oral four times a day  pantoprazole    Tablet 40 milliGRAM(s) Oral before breakfast  polyethylene glycol 3350 17 Gram(s) Oral two times a day  predniSONE   Tablet 40 milliGRAM(s) Oral daily  senna 2 Tablet(s) Oral at bedtime  sodium chloride 3%  Inhalation 4 milliLiter(s) Inhalation every 12 hours    MEDICATIONS  (PRN):  acetaminophen     Tablet .. 650 milliGRAM(s) Oral every 6 hours PRN Temp greater or equal to 38C (100.4F), Mild Pain (1 - 3)  aluminum hydroxide/magnesium hydroxide/simethicone Suspension 30 milliLiter(s) Oral every 4 hours PRN Dyspepsia  bisacodyl Suppository 10 milliGRAM(s) Rectal daily PRN Constipation  dextrose Oral Gel 15 Gram(s) Oral once PRN Blood Glucose LESS THAN 70 milliGRAM(s)/deciliter  guaifenesin/dextromethorphan Oral Liquid 10 milliLiter(s) Oral every 8 hours PRN Cough  melatonin 3 milliGRAM(s) Oral at bedtime PRN Insomnia  ondansetron Injectable 4 milliGRAM(s) IV Push every 8 hours PRN Nausea and/or Vomiting      CAPILLARY BLOOD GLUCOSE      POCT Blood Glucose.: 196 mg/dL (03 Apr 2023 22:31)  POCT Blood Glucose.: 169 mg/dL (03 Apr 2023 17:23)  POCT Blood Glucose.: 186 mg/dL (03 Apr 2023 12:09)  POCT Blood Glucose.: 145 mg/dL (03 Apr 2023 08:47)    I&O's Summary      Vital Signs Last 24 Hrs  T(C): 36.4 (04 Apr 2023 07:00), Max: 36.8 (03 Apr 2023 22:15)  T(F): 97.5 (04 Apr 2023 07:00), Max: 98.2 (03 Apr 2023 22:15)  HR: 76 (04 Apr 2023 07:00) (75 - 95)  BP: 140/78 (04 Apr 2023 07:00) (135/72 - 140/78)  BP(mean): --  RR: 19 (04 Apr 2023 07:00) (19 - 19)  SpO2: 98% (04 Apr 2023 07:00) (97% - 100%)    Parameters below as of 04 Apr 2023 07:00  Patient On (Oxygen Delivery Method): nasal cannula  O2 Flow (L/min): 3      PHYSICAL EXAM:  GENERAL: NAD, lying in bed comfortable, appears older than chronological age, Nasal cannula in place with no respiratory distress.   HEAD:  Atraumatic, normocephalic  EYES: EOMI, PERRLA, conjunctiva and sclera clear  ENT: dry mucous membranes, white film appreciated on tongue  NECK: Supple, no JVD  HEART: Tachycardic rate, regular rhythm, no murmurs, rubs, or gallops  LUNGS: b/l wheezes heard  ABDOMEN: Soft, nontender, nondistended, +BS  EXTREMITIES: 2+ peripheral pulses bilaterally. No clubbing, cyanosis, or edema  NERVOUS SYSTEM:  A&Ox3, no focal deficits   SKIN: +skin tenting, No rashes or lesionslesions    LABS:                        10.7   7.43  )-----------( 178      ( 04 Apr 2023 07:44 )             35.8      04-03    133<L>  |  94<L>  |  11  ----------------------------<  138<H>  4.0   |  36<H>  |  0.30<L>    Ca    8.8      03 Apr 2023 14:57  Phos  2.6     04-03  Mg     2.10     04-03    TPro  5.9<L>  /  Alb  2.8<L>  /  TBili  0.2  /  DBili  x   /  AST  24  /  ALT  36<H>  /  AlkPhos  167<H>  04-03              RADIOLOGY & ADDITIONAL TESTS:    Imaging Personally Reviewed:    Consultant(s) Notes Reviewed:      Care Discussed with Consultants/Other Providers:

## 2023-04-04 NOTE — PROGRESS NOTE ADULT - PROBLEM SELECTOR PLAN 3
- pt constipated per HPI and CT scan  - no bowel obstruction appreciated on exam, abd soft non-tender  - senna, miralax BID, dulcolax,  - BM today, - pt constipated per HPI and CT scan  - no bowel obstruction appreciated on exam, abd soft non-tender  - senna, miralax BID, dulcolax,

## 2023-04-04 NOTE — CHART NOTE - NSCHARTNOTEFT_GEN_A_CORE
Source: Patient A&Ox [4]    Medical Course: 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 and night time AVAPS w/ frequent episodes of admission due to bronchiectasis, HTN, chronic constipation adm for bronchiectasis exacerbation on cefepime.       Nutrition Course: Patient seen at bedside. Offered free [facility]  service, pt prefers to call daughter-in-law for translation. Pt c/o early satiety associated with bloating and gassiness. As per pt, she is eating ~50% of her meals most of the time. Pt noted with hx of cholecystectomy. Recommend add low fat diet. Pt noted previously on Glucerna, pt states Glucerna increased her blood sugar level. Glucerna was d/c'ed.  Pt is amenable to receive Ensure Max 2x daily (300 joey, 60gm pro). Pt continues on Remeron to promote good appetite. Pt c/o persistent excessive phlegm. Tolerating soft and bite size w/o any chewing and swallowing difficulties. Denies any GI distress (nausea/vomiting/diarrhea/constipation.) Last BM yesterday. Bowel regimen ordered. Labs continue notable with elevated POCT but improving. Continues on Consistent CHO diet and on insulin regimen for glucose coverage. Pt has no nutrition related questions or concerns at this time. RD to remain available for further nutritional interventions as indicated.            GI: WDL. Last BM yesterday 4/3    PO intake:  %  Anthropometrics: Height (cm): 157.5 (03-30)  Weight (kg): 39.5 (03-30)  BMI (kg/m2): 15.9 (03-30)    Edema: None reported at present.   Pressure Injuries: None reported at present.     __________________ Pertinent Medications__________________   MEDICATIONS  (STANDING):  acetylcysteine 10%  Inhalation 4 milliLiter(s) Inhalation two times a day  albuterol/ipratropium for Nebulization 3 milliLiter(s) Nebulizer every 6 hours  amLODIPine   Tablet 5 milliGRAM(s) Oral daily  budesonide 160 MICROgram(s)/formoterol 4.5 MICROgram(s) Inhaler 2 Puff(s) Inhalation two times a day  cefepime   IVPB 2000 milliGRAM(s) IV Intermittent every 12 hours  cefepime   IVPB      chlorhexidine 2% Cloths 1 Application(s) Topical daily  dextrose 5%. 1000 milliLiter(s) (50 mL/Hr) IV Continuous <Continuous>  dextrose 5%. 1000 milliLiter(s) (100 mL/Hr) IV Continuous <Continuous>  dextrose 50% Injectable 25 Gram(s) IV Push once  dextrose 50% Injectable 12.5 Gram(s) IV Push once  dextrose 50% Injectable 25 Gram(s) IV Push once  enoxaparin Injectable 30 milliGRAM(s) SubCutaneous every 24 hours  fluticasone propionate 50 MICROgram(s)/spray Nasal Spray 1 Spray(s) Both Nostrils two times a day  glucagon  Injectable 1 milliGRAM(s) IntraMuscular once  guaiFENesin  milliGRAM(s) Oral every 12 hours  influenza  Vaccine (HIGH DOSE) 0.7 milliLiter(s) IntraMuscular once  insulin glargine Injectable (LANTUS) 8 Unit(s) SubCutaneous at bedtime  insulin lispro (ADMELOG) corrective regimen sliding scale   SubCutaneous three times a day before meals  insulin lispro (ADMELOG) corrective regimen sliding scale   SubCutaneous at bedtime  insulin lispro Injectable (ADMELOG) 7 Unit(s) SubCutaneous three times a day before meals  ketotifen 0.025% Ophthalmic Solution 1 Drop(s) Both EYES two times a day  metoprolol succinate ER 50 milliGRAM(s) Oral daily  mirtazapine 7.5 milliGRAM(s) Oral at bedtime  montelukast 10 milliGRAM(s) Oral daily  mupirocin 2% Ointment 1 Application(s) Topical two times a day  nystatin    Suspension 578836 Unit(s) Oral four times a day  pantoprazole    Tablet 40 milliGRAM(s) Oral before breakfast  polyethylene glycol 3350 17 Gram(s) Oral two times a day  predniSONE   Tablet 40 milliGRAM(s) Oral daily  senna 2 Tablet(s) Oral at bedtime  sodium chloride 3%  Inhalation 4 milliLiter(s) Inhalation every 12 hours    MEDICATIONS  (PRN):  acetaminophen     Tablet .. 650 milliGRAM(s) Oral every 6 hours PRN Temp greater or equal to 38C (100.4F), Mild Pain (1 - 3)  aluminum hydroxide/magnesium hydroxide/simethicone Suspension 30 milliLiter(s) Oral every 4 hours PRN Dyspepsia  bisacodyl Suppository 10 milliGRAM(s) Rectal daily PRN Constipation  dextrose Oral Gel 15 Gram(s) Oral once PRN Blood Glucose LESS THAN 70 milliGRAM(s)/deciliter  guaifenesin/dextromethorphan Oral Liquid 10 milliLiter(s) Oral every 8 hours PRN Cough  melatonin 3 milliGRAM(s) Oral at bedtime PRN Insomnia  ondansetron Injectable 4 milliGRAM(s) IV Push every 8 hours PRN Nausea and/or Vomiting      __________________ Pertinent Labs__________________   04-04 Na136 mmol/L Glu 168 mg/dL<H> K+ 4.4 mmol/L Cr  0.36 mg/dL<L> BUN 8 mg/dL 04-04 Phos 3.0 mg/dL 04-03 Alb 2.8 g/dL<L>        POCT Blood Glucose.: 254 mg/dL (04-04-23 @ 11:48)  POCT Blood Glucose.: 150 mg/dL (04-04-23 @ 08:28)  POCT Blood Glucose.: 196 mg/dL (04-03-23 @ 22:31)  POCT Blood Glucose.: 169 mg/dL (04-03-23 @ 17:23)  POCT Blood Glucose.: 186 mg/dL (04-03-23 @ 12:09)  POCT Blood Glucose.: 145 mg/dL (04-03-23 @ 08:47)  POCT Blood Glucose.: 191 mg/dL (04-02-23 @ 22:22)  POCT Blood Glucose.: 194 mg/dL (04-02-23 @ 17:07)  POCT Blood Glucose.: 148 mg/dL (04-02-23 @ 12:14)  POCT Blood Glucose.: 143 mg/dL (04-02-23 @ 08:36)  POCT Blood Glucose.: 104 mg/dL (04-01-23 @ 22:06)  POCT Blood Glucose.: 170 mg/dL (04-01-23 @ 17:14)          Estimated Needs:   [x ] no change since previous assessment      Previous Nutrition Diagnosis: Severe malnutrition     Nutrition Diagnosis is [x ] ongoing       Recommendations:  1) Recommend add low fat diet.   2) Recommend add Ensure Max 2x daily (300 joey, 60gm pro) to provide optimal nutrition.   3) Recommend consider add probiotics to promote GI health.   4) Monitor PO intake, Labs, weights, BMs, and skin integrity.   5) RD to remain available for further nutritional interventions as indicated.       Monitoring and Evaluation:      [ x] Tolerance to diet prescription [x ] weights [x ] follow up per protocol  [ ] other:

## 2023-04-04 NOTE — PROGRESS NOTE ADULT - SUBJECTIVE AND OBJECTIVE BOX
CHIEF COMPLAINT:    Interval Events: Pt seen and examined at bedside.     REVIEW OF SYSTEMS:  Constitutional: No fevers or chills. No weight loss. No fatigue or generalised malaise.  Eyes: No itching or discharge from the eyes  ENT: No ear pain. No ear discharge. No nasal congestion. No post nasal drip. No epistaxis. No throat pain. No sore throat. No difficulty swallowing.   CV: No chest pain. No palpitations. No lightheadedness or dizziness.   Resp: No dyspnea at rest. No dyspnea on exertion. No orthopnea. No wheezing. No cough. No stridor. No sputum production. No chest pain with respiration.      OBJECTIVE:  ICU Vital Signs Last 24 Hrs  T(C): 36.8 (03 Apr 2023 22:15), Max: 36.8 (03 Apr 2023 22:15)  T(F): 98.2 (03 Apr 2023 22:15), Max: 98.2 (03 Apr 2023 22:15)  HR: 75 (04 Apr 2023 03:11) (75 - 95)  BP: 137/75 (03 Apr 2023 22:15) (135/72 - 140/75)  BP(mean): --  ABP: --  ABP(mean): --  RR: 19 (03 Apr 2023 22:15) (19 - 19)  SpO2: 97% (04 Apr 2023 03:11) (97% - 100%)    O2 Parameters below as of 03 Apr 2023 22:15  Patient On (Oxygen Delivery Method): nasal cannula  O2 Flow (L/min): 3        Mode: NIV (Noninvasive Ventilation), RR (machine): 18, TV (machine): 450, FiO2: 35, PEEP: 5, ITime: 1, P-High: 25, P-Low: 15, PIP: 24    CAPILLARY BLOOD GLUCOSE      POCT Blood Glucose.: 196 mg/dL (03 Apr 2023 22:31)      PHYSICAL EXAM:  GENERAL: NAD, well-groomed, well-developed  HEAD:  Atraumatic, Normocephalic  EYES: EOMI, PERRLA, conjunctiva and sclera clear  ENMT: No tonsillar erythema, exudates, or enlargement; Moist mucous membranes, Good dentition, No lesions  NECK: Supple, No JVD, Normal thyroid  CHEST/LUNG: rhonchi and wheeze   HEART: Regular rate and rhythm; No murmurs, rubs, or gallops  ABDOMEN: Soft, Nontender, Nondistended; Bowel sounds present  VASCULAR:  2+ Peripheral Pulses, No clubbing, cyanosis, or edema  LYMPH: No lymphadenopathy noted  SKIN: No rashes or lesions  NERVOUS SYSTEM:  Alert & Oriented X3, Good concentration; Motor Strength 5/5 B/L upper and lower extremities; DTRs 2+ intact and symmetric      HOSPITAL MEDICATIONS:  MEDICATIONS  (STANDING):  acetylcysteine 10%  Inhalation 4 milliLiter(s) Inhalation two times a day  albuterol/ipratropium for Nebulization 3 milliLiter(s) Nebulizer every 6 hours  amLODIPine   Tablet 5 milliGRAM(s) Oral daily  budesonide 160 MICROgram(s)/formoterol 4.5 MICROgram(s) Inhaler 2 Puff(s) Inhalation two times a day  cefepime   IVPB 2000 milliGRAM(s) IV Intermittent every 12 hours  cefepime   IVPB      chlorhexidine 2% Cloths 1 Application(s) Topical daily  dextrose 5%. 1000 milliLiter(s) (100 mL/Hr) IV Continuous <Continuous>  dextrose 5%. 1000 milliLiter(s) (50 mL/Hr) IV Continuous <Continuous>  dextrose 50% Injectable 25 Gram(s) IV Push once  dextrose 50% Injectable 12.5 Gram(s) IV Push once  dextrose 50% Injectable 25 Gram(s) IV Push once  enoxaparin Injectable 30 milliGRAM(s) SubCutaneous every 24 hours  fluticasone propionate 50 MICROgram(s)/spray Nasal Spray 1 Spray(s) Both Nostrils two times a day  glucagon  Injectable 1 milliGRAM(s) IntraMuscular once  influenza  Vaccine (HIGH DOSE) 0.7 milliLiter(s) IntraMuscular once  insulin glargine Injectable (LANTUS) 8 Unit(s) SubCutaneous at bedtime  insulin lispro (ADMELOG) corrective regimen sliding scale   SubCutaneous three times a day before meals  insulin lispro (ADMELOG) corrective regimen sliding scale   SubCutaneous at bedtime  insulin lispro Injectable (ADMELOG) 7 Unit(s) SubCutaneous three times a day before meals  ketotifen 0.025% Ophthalmic Solution 1 Drop(s) Both EYES two times a day  metoprolol succinate ER 50 milliGRAM(s) Oral daily  mirtazapine 7.5 milliGRAM(s) Oral at bedtime  montelukast 10 milliGRAM(s) Oral daily  nystatin    Suspension 408195 Unit(s) Oral four times a day  pantoprazole    Tablet 40 milliGRAM(s) Oral before breakfast  polyethylene glycol 3350 17 Gram(s) Oral two times a day  senna 2 Tablet(s) Oral at bedtime  sodium chloride 3%  Inhalation 4 milliLiter(s) Inhalation every 12 hours    MEDICATIONS  (PRN):  acetaminophen     Tablet .. 650 milliGRAM(s) Oral every 6 hours PRN Temp greater or equal to 38C (100.4F), Mild Pain (1 - 3)  aluminum hydroxide/magnesium hydroxide/simethicone Suspension 30 milliLiter(s) Oral every 4 hours PRN Dyspepsia  bisacodyl Suppository 10 milliGRAM(s) Rectal daily PRN Constipation  dextrose Oral Gel 15 Gram(s) Oral once PRN Blood Glucose LESS THAN 70 milliGRAM(s)/deciliter  guaifenesin/dextromethorphan Oral Liquid 10 milliLiter(s) Oral every 8 hours PRN Cough  melatonin 3 milliGRAM(s) Oral at bedtime PRN Insomnia  ondansetron Injectable 4 milliGRAM(s) IV Push every 8 hours PRN Nausea and/or Vomiting      LABS:                        11.4   7.99  )-----------( 175      ( 03 Apr 2023 06:55 )             37.2     04-03    133<L>  |  94<L>  |  11  ----------------------------<  138<H>  4.0   |  36<H>  |  0.30<L>    Ca    8.8      03 Apr 2023 14:57  Phos  2.6     04-03  Mg     2.10     04-03    TPro  5.9<L>  /  Alb  2.8<L>  /  TBili  0.2  /  DBili  x   /  AST  24  /  ALT  36<H>  /  AlkPhos  167<H>  04-03          Venous Blood Gas:  04-03 @ 06:55  7.56/43/199/38/98.5  VBG Lactate: 0.9      MICROBIOLOGY:     RADIOLOGY:  [ ] Reviewed and interpreted by me    Point of Care Ultrasound Findings:    PFT:    EKG:

## 2023-04-04 NOTE — PROGRESS NOTE ADULT - PROBLEM SELECTOR PLAN 1
RESOLVED    - pt met sepsis criteria due to elevated WBC, increased WOB (RR), and elevated lactate  - s/p ceftriaxone in the ED; now on cefepime (day 4/7); dc azithromycin (U legionella neg)  - outpt record showed colonization of sputum w/ Pseudomonas and Klebsiella  - Bcx NGTD, Scx NGTD

## 2023-04-04 NOTE — PROGRESS NOTE ADULT - ATTENDING COMMENTS
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   Patient c/o D6/7 Cefepime course  Started on steroids , C/w AVAPS  f/w ABG , Pulmonary on the case   Expectorants , DVTPx

## 2023-04-04 NOTE — PROGRESS NOTE ADULT - PROBLEM SELECTOR PLAN 2
- Hx of PCD, difficulty clearing mucus per pt  - aggressive pulm toileting, including chest PT, mucomyst, hypersal, chest vest q12h, aerobika q12h   - c/w symbicort, singular, standing duonebs   - pulm consulted, follow recs   - CT Chest: New clustered nodules/tree-in-bud opacities in the right upper and left lower lobes suggesting infectious etiology with endobronchial spread. - Hx of PCD, difficulty clearing mucus per pt  - aggressive pulm toileting, including chest PT, mucomyst, hypersal, chest vest q12h, aerobika q12h   - c/w symbicort, singular, standing duonebs   - pulm consulted, follow recs   - CT Chest: New clustered nodules/tree-in-bud opacities in the right upper and left lower lobes suggesting infectious etiology with endobronchial spread.  - prednisone 40mg qd x5 doses to end on 4/8  - s/p diamox for metabolic alklalosis

## 2023-04-04 NOTE — PROGRESS NOTE ADULT - ASSESSMENT
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 and night time AVAPS w/ frequent episodes of admission due to bronchiectasis, HTN, chronic constipation, presents with bronchiectasis exacerbation    #Bronchiectasis  #Hypoxic Respiratory failure    Recommendations:  - Sputum culture reviewed, growing klebsiella and pseudomonas  - continue with course of cefepime, currently on day 6  - Airway clearance with duonebs q6h, Hypersal, Aerobika BID, Chest vest BID  - Symbicort 160 BID   - Would start prednisone 40mg qd for 5 days  - avoid dual anticholinergics   - c/w home Singulair  - supplemental O2, avoid hyperoxemia, goal sat >90%  - continue with AVAPS at night   - Check blood gas this morning after on avaps overnight and dose of diamox yesterday.

## 2023-04-05 LAB
ANION GAP SERPL CALC-SCNC: 5 MMOL/L — LOW (ref 7–14)
BASE EXCESS BLDV CALC-SCNC: 6.5 MMOL/L — HIGH (ref -2–3)
BUN SERPL-MCNC: 12 MG/DL — SIGNIFICANT CHANGE UP (ref 7–23)
CALCIUM SERPL-MCNC: 9.5 MG/DL — SIGNIFICANT CHANGE UP (ref 8.4–10.5)
CHLORIDE SERPL-SCNC: 101 MMOL/L — SIGNIFICANT CHANGE UP (ref 98–107)
CO2 BLDV-SCNC: 38.7 MMOL/L — HIGH (ref 22–26)
CO2 SERPL-SCNC: 30 MMOL/L — SIGNIFICANT CHANGE UP (ref 22–31)
CREAT SERPL-MCNC: 0.35 MG/DL — LOW (ref 0.5–1.3)
EGFR: 113 ML/MIN/1.73M2 — SIGNIFICANT CHANGE UP
GLUCOSE BLDC GLUCOMTR-MCNC: 243 MG/DL — HIGH (ref 70–99)
GLUCOSE BLDC GLUCOMTR-MCNC: 286 MG/DL — HIGH (ref 70–99)
GLUCOSE BLDC GLUCOMTR-MCNC: 292 MG/DL — HIGH (ref 70–99)
GLUCOSE BLDC GLUCOMTR-MCNC: 73 MG/DL — SIGNIFICANT CHANGE UP (ref 70–99)
GLUCOSE SERPL-MCNC: 142 MG/DL — HIGH (ref 70–99)
HCO3 BLDV-SCNC: 36 MMOL/L — HIGH (ref 22–29)
HCT VFR BLD CALC: 36.2 % — SIGNIFICANT CHANGE UP (ref 34.5–45)
HGB BLD-MCNC: 10.8 G/DL — LOW (ref 11.5–15.5)
MAGNESIUM SERPL-MCNC: 2.1 MG/DL — SIGNIFICANT CHANGE UP (ref 1.6–2.6)
MCHC RBC-ENTMCNC: 26.1 PG — LOW (ref 27–34)
MCHC RBC-ENTMCNC: 29.8 GM/DL — LOW (ref 32–36)
MCV RBC AUTO: 87.4 FL — SIGNIFICANT CHANGE UP (ref 80–100)
NRBC # BLD: 0 /100 WBCS — SIGNIFICANT CHANGE UP (ref 0–0)
NRBC # FLD: 0 K/UL — SIGNIFICANT CHANGE UP (ref 0–0)
PCO2 BLDV: 79 MMHG — HIGH (ref 39–52)
PH BLDV: 7.27 — LOW (ref 7.32–7.43)
PHOSPHATE SERPL-MCNC: 2.8 MG/DL — SIGNIFICANT CHANGE UP (ref 2.5–4.5)
PLATELET # BLD AUTO: 164 K/UL — SIGNIFICANT CHANGE UP (ref 150–400)
PO2 BLDV: 65 MMHG — HIGH (ref 25–45)
POTASSIUM SERPL-MCNC: 4.6 MMOL/L — SIGNIFICANT CHANGE UP (ref 3.5–5.3)
POTASSIUM SERPL-SCNC: 4.6 MMOL/L — SIGNIFICANT CHANGE UP (ref 3.5–5.3)
RBC # BLD: 4.14 M/UL — SIGNIFICANT CHANGE UP (ref 3.8–5.2)
RBC # FLD: 13.4 % — SIGNIFICANT CHANGE UP (ref 10.3–14.5)
SAO2 % BLDV: 93.4 % — HIGH (ref 67–88)
SODIUM SERPL-SCNC: 136 MMOL/L — SIGNIFICANT CHANGE UP (ref 135–145)
WBC # BLD: 7.07 K/UL — SIGNIFICANT CHANGE UP (ref 3.8–10.5)
WBC # FLD AUTO: 7.07 K/UL — SIGNIFICANT CHANGE UP (ref 3.8–10.5)

## 2023-04-05 PROCEDURE — 99233 SBSQ HOSP IP/OBS HIGH 50: CPT | Mod: GC

## 2023-04-05 PROCEDURE — 99232 SBSQ HOSP IP/OBS MODERATE 35: CPT | Mod: GC

## 2023-04-05 RX ORDER — LANOLIN ALCOHOL/MO/W.PET/CERES
6 CREAM (GRAM) TOPICAL AT BEDTIME
Refills: 0 | Status: DISCONTINUED | OUTPATIENT
Start: 2023-04-05 | End: 2023-04-11

## 2023-04-05 RX ORDER — INSULIN GLARGINE 100 [IU]/ML
10 INJECTION, SOLUTION SUBCUTANEOUS AT BEDTIME
Refills: 0 | Status: DISCONTINUED | OUTPATIENT
Start: 2023-04-05 | End: 2023-04-09

## 2023-04-05 RX ORDER — INSULIN LISPRO 100/ML
9 VIAL (ML) SUBCUTANEOUS
Refills: 0 | Status: DISCONTINUED | OUTPATIENT
Start: 2023-04-05 | End: 2023-04-09

## 2023-04-05 RX ORDER — LANOLIN ALCOHOL/MO/W.PET/CERES
6 CREAM (GRAM) TOPICAL AT BEDTIME
Refills: 0 | Status: DISCONTINUED | OUTPATIENT
Start: 2023-04-05 | End: 2023-04-05

## 2023-04-05 RX ORDER — ALPRAZOLAM 0.25 MG
0.25 TABLET ORAL EVERY 8 HOURS
Refills: 0 | Status: DISCONTINUED | OUTPATIENT
Start: 2023-04-05 | End: 2023-04-05

## 2023-04-05 RX ORDER — ALPRAZOLAM 0.25 MG
0.25 TABLET ORAL EVERY 8 HOURS
Refills: 0 | Status: DISCONTINUED | OUTPATIENT
Start: 2023-04-05 | End: 2023-04-11

## 2023-04-05 RX ORDER — TAMSULOSIN HYDROCHLORIDE 0.4 MG/1
0.4 CAPSULE ORAL AT BEDTIME
Refills: 0 | Status: DISCONTINUED | OUTPATIENT
Start: 2023-04-05 | End: 2023-04-11

## 2023-04-05 RX ADMIN — Medication 3 MILLILITER(S): at 04:28

## 2023-04-05 RX ADMIN — CEFEPIME 100 MILLIGRAM(S): 1 INJECTION, POWDER, FOR SOLUTION INTRAMUSCULAR; INTRAVENOUS at 05:52

## 2023-04-05 RX ADMIN — Medication 4 MILLILITER(S): at 22:38

## 2023-04-05 RX ADMIN — CHLORHEXIDINE GLUCONATE 1 APPLICATION(S): 213 SOLUTION TOPICAL at 21:50

## 2023-04-05 RX ADMIN — Medication 3: at 18:06

## 2023-04-05 RX ADMIN — Medication 40 MILLIGRAM(S): at 05:51

## 2023-04-05 RX ADMIN — Medication 2: at 08:47

## 2023-04-05 RX ADMIN — Medication 3 MILLILITER(S): at 10:06

## 2023-04-05 RX ADMIN — PANTOPRAZOLE SODIUM 40 MILLIGRAM(S): 20 TABLET, DELAYED RELEASE ORAL at 05:59

## 2023-04-05 RX ADMIN — MIRTAZAPINE 7.5 MILLIGRAM(S): 45 TABLET, ORALLY DISINTEGRATING ORAL at 21:57

## 2023-04-05 RX ADMIN — KETOTIFEN FUMARATE 1 DROP(S): 0.34 SOLUTION OPHTHALMIC at 22:02

## 2023-04-05 RX ADMIN — Medication 9 UNIT(S): at 18:06

## 2023-04-05 RX ADMIN — MUPIROCIN 1 APPLICATION(S): 20 OINTMENT TOPICAL at 18:03

## 2023-04-05 RX ADMIN — Medication 3 MILLILITER(S): at 22:38

## 2023-04-05 RX ADMIN — Medication 500000 UNIT(S): at 18:02

## 2023-04-05 RX ADMIN — SODIUM CHLORIDE 4 MILLILITER(S): 9 INJECTION INTRAMUSCULAR; INTRAVENOUS; SUBCUTANEOUS at 22:38

## 2023-04-05 RX ADMIN — Medication 9 UNIT(S): at 12:55

## 2023-04-05 RX ADMIN — AMLODIPINE BESYLATE 5 MILLIGRAM(S): 2.5 TABLET ORAL at 05:50

## 2023-04-05 RX ADMIN — Medication 3: at 12:54

## 2023-04-05 RX ADMIN — Medication 50 MILLIGRAM(S): at 05:51

## 2023-04-05 RX ADMIN — BUDESONIDE AND FORMOTEROL FUMARATE DIHYDRATE 2 PUFF(S): 160; 4.5 AEROSOL RESPIRATORY (INHALATION) at 21:55

## 2023-04-05 RX ADMIN — TAMSULOSIN HYDROCHLORIDE 0.4 MILLIGRAM(S): 0.4 CAPSULE ORAL at 22:01

## 2023-04-05 RX ADMIN — MUPIROCIN 1 APPLICATION(S): 20 OINTMENT TOPICAL at 05:52

## 2023-04-05 RX ADMIN — Medication 4 MILLILITER(S): at 10:06

## 2023-04-05 RX ADMIN — Medication 6 MILLIGRAM(S): at 21:58

## 2023-04-05 RX ADMIN — SENNA PLUS 2 TABLET(S): 8.6 TABLET ORAL at 21:57

## 2023-04-05 RX ADMIN — Medication 600 MILLIGRAM(S): at 05:51

## 2023-04-05 RX ADMIN — Medication 500000 UNIT(S): at 12:54

## 2023-04-05 RX ADMIN — SODIUM CHLORIDE 4 MILLILITER(S): 9 INJECTION INTRAMUSCULAR; INTRAVENOUS; SUBCUTANEOUS at 10:07

## 2023-04-05 RX ADMIN — KETOTIFEN FUMARATE 1 DROP(S): 0.34 SOLUTION OPHTHALMIC at 05:52

## 2023-04-05 RX ADMIN — BUDESONIDE AND FORMOTEROL FUMARATE DIHYDRATE 2 PUFF(S): 160; 4.5 AEROSOL RESPIRATORY (INHALATION) at 09:18

## 2023-04-05 RX ADMIN — ENOXAPARIN SODIUM 30 MILLIGRAM(S): 100 INJECTION SUBCUTANEOUS at 21:56

## 2023-04-05 RX ADMIN — Medication 600 MILLIGRAM(S): at 18:02

## 2023-04-05 RX ADMIN — Medication 3 MILLILITER(S): at 15:31

## 2023-04-05 RX ADMIN — CEFEPIME 100 MILLIGRAM(S): 1 INJECTION, POWDER, FOR SOLUTION INTRAMUSCULAR; INTRAVENOUS at 18:00

## 2023-04-05 RX ADMIN — Medication 1 TABLET(S): at 12:53

## 2023-04-05 RX ADMIN — Medication 500000 UNIT(S): at 05:51

## 2023-04-05 RX ADMIN — Medication 1 SPRAY(S): at 18:02

## 2023-04-05 RX ADMIN — Medication 1 SPRAY(S): at 05:51

## 2023-04-05 RX ADMIN — MONTELUKAST 10 MILLIGRAM(S): 4 TABLET, CHEWABLE ORAL at 12:53

## 2023-04-05 RX ADMIN — POLYETHYLENE GLYCOL 3350 17 GRAM(S): 17 POWDER, FOR SOLUTION ORAL at 05:51

## 2023-04-05 NOTE — PROVIDER CONTACT NOTE (OTHER) - ASSESSMENT
Patient bladder scan resulted 332cc, c/o lower abdominal discomfort.
no distress noted at this time, pt at bedside with 
no distress noted, pt states she had a bm yesterday. pt accepted Mirlax
Patient is sitting up in bed. and lying down short after

## 2023-04-05 NOTE — PROGRESS NOTE ADULT - SUBJECTIVE AND OBJECTIVE BOX
Patient is a 68y old  Female who presents with a chief complaint of SOB (04 Apr 2023 08:10)      SUBJECTIVE / OVERNIGHT EVENTS:    MEDICATIONS  (STANDING):  acetylcysteine 10%  Inhalation 4 milliLiter(s) Inhalation two times a day  albuterol/ipratropium for Nebulization 3 milliLiter(s) Nebulizer every 6 hours  amLODIPine   Tablet 5 milliGRAM(s) Oral daily  budesonide 160 MICROgram(s)/formoterol 4.5 MICROgram(s) Inhaler 2 Puff(s) Inhalation two times a day  cefepime   IVPB 2000 milliGRAM(s) IV Intermittent every 12 hours  cefepime   IVPB      chlorhexidine 2% Cloths 1 Application(s) Topical daily  dextrose 5%. 1000 milliLiter(s) (100 mL/Hr) IV Continuous <Continuous>  dextrose 5%. 1000 milliLiter(s) (50 mL/Hr) IV Continuous <Continuous>  dextrose 50% Injectable 25 Gram(s) IV Push once  dextrose 50% Injectable 12.5 Gram(s) IV Push once  dextrose 50% Injectable 25 Gram(s) IV Push once  enoxaparin Injectable 30 milliGRAM(s) SubCutaneous every 24 hours  fluticasone propionate 50 MICROgram(s)/spray Nasal Spray 1 Spray(s) Both Nostrils two times a day  glucagon  Injectable 1 milliGRAM(s) IntraMuscular once  guaiFENesin  milliGRAM(s) Oral every 12 hours  influenza  Vaccine (HIGH DOSE) 0.7 milliLiter(s) IntraMuscular once  insulin glargine Injectable (LANTUS) 10 Unit(s) SubCutaneous at bedtime  insulin lispro (ADMELOG) corrective regimen sliding scale   SubCutaneous three times a day before meals  insulin lispro (ADMELOG) corrective regimen sliding scale   SubCutaneous at bedtime  insulin lispro Injectable (ADMELOG) 9 Unit(s) SubCutaneous three times a day before meals  ketotifen 0.025% Ophthalmic Solution 1 Drop(s) Both EYES two times a day  lactobacillus acidophilus 1 Tablet(s) Oral daily  metoprolol succinate ER 50 milliGRAM(s) Oral daily  mirtazapine 7.5 milliGRAM(s) Oral at bedtime  montelukast 10 milliGRAM(s) Oral daily  mupirocin 2% Ointment 1 Application(s) Topical two times a day  nystatin    Suspension 089875 Unit(s) Oral four times a day  pantoprazole    Tablet 40 milliGRAM(s) Oral before breakfast  polyethylene glycol 3350 17 Gram(s) Oral two times a day  predniSONE   Tablet 40 milliGRAM(s) Oral daily  senna 2 Tablet(s) Oral at bedtime  sodium chloride 3%  Inhalation 4 milliLiter(s) Inhalation every 12 hours    MEDICATIONS  (PRN):  acetaminophen     Tablet .. 650 milliGRAM(s) Oral every 6 hours PRN Temp greater or equal to 38C (100.4F), Mild Pain (1 - 3)  aluminum hydroxide/magnesium hydroxide/simethicone Suspension 30 milliLiter(s) Oral every 4 hours PRN Dyspepsia  bisacodyl Suppository 10 milliGRAM(s) Rectal daily PRN Constipation  dextrose Oral Gel 15 Gram(s) Oral once PRN Blood Glucose LESS THAN 70 milliGRAM(s)/deciliter  guaifenesin/dextromethorphan Oral Liquid 10 milliLiter(s) Oral every 8 hours PRN Cough  LORazepam     Tablet 0.5 milliGRAM(s) Oral every 8 hours PRN Anxiety  melatonin 3 milliGRAM(s) Oral at bedtime PRN Insomnia  ondansetron Injectable 4 milliGRAM(s) IV Push every 8 hours PRN Nausea and/or Vomiting      CAPILLARY BLOOD GLUCOSE      POCT Blood Glucose.: 264 mg/dL (04 Apr 2023 22:20)  POCT Blood Glucose.: 317 mg/dL (04 Apr 2023 17:09)  POCT Blood Glucose.: 254 mg/dL (04 Apr 2023 11:48)  POCT Blood Glucose.: 150 mg/dL (04 Apr 2023 08:28)    I&O's Summary      Vital Signs Last 24 Hrs  T(C): 36.6 (04 Apr 2023 21:50), Max: 36.7 (04 Apr 2023 14:38)  T(F): 97.9 (04 Apr 2023 21:50), Max: 98.1 (04 Apr 2023 14:38)  HR: 80 (05 Apr 2023 04:28) (72 - 90)  BP: 124/70 (04 Apr 2023 21:50) (120/55 - 124/70)  BP(mean): --  RR: 19 (04 Apr 2023 21:50) (18 - 19)  SpO2: 99% (05 Apr 2023 04:28) (97% - 100%)    Parameters below as of 05 Apr 2023 04:28  Patient On (Oxygen Delivery Method): nasal cannula        PHYSICAL EXAM:  GENERAL: NAD, well-developed  HEAD: Atraumatic, Normocephalic  EYES: EOMI, PERRLA, conjunctiva and sclera clear  NECK: Supple, No JVD  CHEST/LUNG: Clear to auscultation bilaterally; No wheezes or crackles  HEART: Normal S1/S2; Regular rate and rhythm; No murmurs, rubs, or gallops  ABDOMEN: Soft, Nontender, Nondistended; Bowel sounds present  EXTREMITIES: 2+ Peripheral Pulses; No clubbing, cyanosis, or edema  PSYCH: A&Ox3  NEUROLOGY: no focal neurologic deficit  SKIN: No rashes or lesions    LABS:                        10.8   7.07  )-----------( 164      ( 05 Apr 2023 05:40 )             36.2      04-05    136  |  101  |  12  ----------------------------<  142<H>  4.6   |  30  |  0.35<L>    Ca    9.5      05 Apr 2023 05:40  Phos  2.8     04-05  Mg     2.10     04-05                RADIOLOGY & ADDITIONAL TESTS:    Imaging Personally Reviewed:    Consultant(s) Notes Reviewed:      Care Discussed with Consultants/Other Providers:   Patient is a 68y old  Female who presents with a chief complaint of SOB (04 Apr 2023 08:10)      SUBJECTIVE / OVERNIGHT EVENTS:    Patient did not get AVAPS o/n as was coughing when placed on machine.  Patient not feeling well this am, did not get sleep, no BM, and poor urination.    MEDICATIONS  (STANDING):  acetylcysteine 10%  Inhalation 4 milliLiter(s) Inhalation two times a day  albuterol/ipratropium for Nebulization 3 milliLiter(s) Nebulizer every 6 hours  amLODIPine   Tablet 5 milliGRAM(s) Oral daily  budesonide 160 MICROgram(s)/formoterol 4.5 MICROgram(s) Inhaler 2 Puff(s) Inhalation two times a day  cefepime   IVPB 2000 milliGRAM(s) IV Intermittent every 12 hours  cefepime   IVPB      chlorhexidine 2% Cloths 1 Application(s) Topical daily  dextrose 5%. 1000 milliLiter(s) (100 mL/Hr) IV Continuous <Continuous>  dextrose 5%. 1000 milliLiter(s) (50 mL/Hr) IV Continuous <Continuous>  dextrose 50% Injectable 25 Gram(s) IV Push once  dextrose 50% Injectable 12.5 Gram(s) IV Push once  dextrose 50% Injectable 25 Gram(s) IV Push once  enoxaparin Injectable 30 milliGRAM(s) SubCutaneous every 24 hours  fluticasone propionate 50 MICROgram(s)/spray Nasal Spray 1 Spray(s) Both Nostrils two times a day  glucagon  Injectable 1 milliGRAM(s) IntraMuscular once  guaiFENesin  milliGRAM(s) Oral every 12 hours  influenza  Vaccine (HIGH DOSE) 0.7 milliLiter(s) IntraMuscular once  insulin glargine Injectable (LANTUS) 10 Unit(s) SubCutaneous at bedtime  insulin lispro (ADMELOG) corrective regimen sliding scale   SubCutaneous three times a day before meals  insulin lispro (ADMELOG) corrective regimen sliding scale   SubCutaneous at bedtime  insulin lispro Injectable (ADMELOG) 9 Unit(s) SubCutaneous three times a day before meals  ketotifen 0.025% Ophthalmic Solution 1 Drop(s) Both EYES two times a day  lactobacillus acidophilus 1 Tablet(s) Oral daily  metoprolol succinate ER 50 milliGRAM(s) Oral daily  mirtazapine 7.5 milliGRAM(s) Oral at bedtime  montelukast 10 milliGRAM(s) Oral daily  mupirocin 2% Ointment 1 Application(s) Topical two times a day  nystatin    Suspension 206972 Unit(s) Oral four times a day  pantoprazole    Tablet 40 milliGRAM(s) Oral before breakfast  polyethylene glycol 3350 17 Gram(s) Oral two times a day  predniSONE   Tablet 40 milliGRAM(s) Oral daily  senna 2 Tablet(s) Oral at bedtime  sodium chloride 3%  Inhalation 4 milliLiter(s) Inhalation every 12 hours    MEDICATIONS  (PRN):  acetaminophen     Tablet .. 650 milliGRAM(s) Oral every 6 hours PRN Temp greater or equal to 38C (100.4F), Mild Pain (1 - 3)  aluminum hydroxide/magnesium hydroxide/simethicone Suspension 30 milliLiter(s) Oral every 4 hours PRN Dyspepsia  bisacodyl Suppository 10 milliGRAM(s) Rectal daily PRN Constipation  dextrose Oral Gel 15 Gram(s) Oral once PRN Blood Glucose LESS THAN 70 milliGRAM(s)/deciliter  guaifenesin/dextromethorphan Oral Liquid 10 milliLiter(s) Oral every 8 hours PRN Cough  LORazepam     Tablet 0.5 milliGRAM(s) Oral every 8 hours PRN Anxiety  melatonin 3 milliGRAM(s) Oral at bedtime PRN Insomnia  ondansetron Injectable 4 milliGRAM(s) IV Push every 8 hours PRN Nausea and/or Vomiting      CAPILLARY BLOOD GLUCOSE      POCT Blood Glucose.: 264 mg/dL (04 Apr 2023 22:20)  POCT Blood Glucose.: 317 mg/dL (04 Apr 2023 17:09)  POCT Blood Glucose.: 254 mg/dL (04 Apr 2023 11:48)  POCT Blood Glucose.: 150 mg/dL (04 Apr 2023 08:28)    I&O's Summary      Vital Signs Last 24 Hrs  T(C): 36.6 (04 Apr 2023 21:50), Max: 36.7 (04 Apr 2023 14:38)  T(F): 97.9 (04 Apr 2023 21:50), Max: 98.1 (04 Apr 2023 14:38)  HR: 80 (05 Apr 2023 04:28) (72 - 90)  BP: 124/70 (04 Apr 2023 21:50) (120/55 - 124/70)  BP(mean): --  RR: 19 (04 Apr 2023 21:50) (18 - 19)  SpO2: 99% (05 Apr 2023 04:28) (97% - 100%)    Parameters below as of 05 Apr 2023 04:28  Patient On (Oxygen Delivery Method): nasal cannula        PHYSICAL EXAM:  GENERAL: NAD, well-developed  HEAD: Atraumatic, Normocephalic  EYES: EOMI, PERRLA, conjunctiva and sclera clear  NECK: Supple, No JVD  CHEST/LUNG: Clear to auscultation bilaterally; No wheezes or crackles  HEART: Normal S1/S2; Regular rate and rhythm; No murmurs, rubs, or gallops  ABDOMEN: Soft, Nontender, Nondistended; Bowel sounds present  EXTREMITIES: 2+ Peripheral Pulses; No clubbing, cyanosis, or edema  PSYCH: A&Ox3  NEUROLOGY: no focal neurologic deficit  SKIN: No rashes or lesions    LABS:                        10.8   7.07  )-----------( 164      ( 05 Apr 2023 05:40 )             36.2      04-05    136  |  101  |  12  ----------------------------<  142<H>  4.6   |  30  |  0.35<L>    Ca    9.5      05 Apr 2023 05:40  Phos  2.8     04-05  Mg     2.10     04-05                RADIOLOGY & ADDITIONAL TESTS:    Imaging Personally Reviewed:    Consultant(s) Notes Reviewed:      Care Discussed with Consultants/Other Providers:   Patient is a 68y old  Female who presents with a chief complaint of SOB (04 Apr 2023 08:10)      SUBJECTIVE / OVERNIGHT EVENTS:    Patient did not get AVAPS o/n as was coughing when placed on machine.  Patient not feeling well this am, did not get sleep, no BM, and poor urination.    MEDICATIONS  (STANDING):  acetylcysteine 10%  Inhalation 4 milliLiter(s) Inhalation two times a day  albuterol/ipratropium for Nebulization 3 milliLiter(s) Nebulizer every 6 hours  amLODIPine   Tablet 5 milliGRAM(s) Oral daily  budesonide 160 MICROgram(s)/formoterol 4.5 MICROgram(s) Inhaler 2 Puff(s) Inhalation two times a day  cefepime   IVPB 2000 milliGRAM(s) IV Intermittent every 12 hours  cefepime   IVPB      chlorhexidine 2% Cloths 1 Application(s) Topical daily  dextrose 5%. 1000 milliLiter(s) (100 mL/Hr) IV Continuous <Continuous>  dextrose 5%. 1000 milliLiter(s) (50 mL/Hr) IV Continuous <Continuous>  dextrose 50% Injectable 25 Gram(s) IV Push once  dextrose 50% Injectable 12.5 Gram(s) IV Push once  dextrose 50% Injectable 25 Gram(s) IV Push once  enoxaparin Injectable 30 milliGRAM(s) SubCutaneous every 24 hours  fluticasone propionate 50 MICROgram(s)/spray Nasal Spray 1 Spray(s) Both Nostrils two times a day  glucagon  Injectable 1 milliGRAM(s) IntraMuscular once  guaiFENesin  milliGRAM(s) Oral every 12 hours  influenza  Vaccine (HIGH DOSE) 0.7 milliLiter(s) IntraMuscular once  insulin glargine Injectable (LANTUS) 10 Unit(s) SubCutaneous at bedtime  insulin lispro (ADMELOG) corrective regimen sliding scale   SubCutaneous three times a day before meals  insulin lispro (ADMELOG) corrective regimen sliding scale   SubCutaneous at bedtime  insulin lispro Injectable (ADMELOG) 9 Unit(s) SubCutaneous three times a day before meals  ketotifen 0.025% Ophthalmic Solution 1 Drop(s) Both EYES two times a day  lactobacillus acidophilus 1 Tablet(s) Oral daily  metoprolol succinate ER 50 milliGRAM(s) Oral daily  mirtazapine 7.5 milliGRAM(s) Oral at bedtime  montelukast 10 milliGRAM(s) Oral daily  mupirocin 2% Ointment 1 Application(s) Topical two times a day  nystatin    Suspension 848381 Unit(s) Oral four times a day  pantoprazole    Tablet 40 milliGRAM(s) Oral before breakfast  polyethylene glycol 3350 17 Gram(s) Oral two times a day  predniSONE   Tablet 40 milliGRAM(s) Oral daily  senna 2 Tablet(s) Oral at bedtime  sodium chloride 3%  Inhalation 4 milliLiter(s) Inhalation every 12 hours    MEDICATIONS  (PRN):  acetaminophen     Tablet .. 650 milliGRAM(s) Oral every 6 hours PRN Temp greater or equal to 38C (100.4F), Mild Pain (1 - 3)  aluminum hydroxide/magnesium hydroxide/simethicone Suspension 30 milliLiter(s) Oral every 4 hours PRN Dyspepsia  bisacodyl Suppository 10 milliGRAM(s) Rectal daily PRN Constipation  dextrose Oral Gel 15 Gram(s) Oral once PRN Blood Glucose LESS THAN 70 milliGRAM(s)/deciliter  guaifenesin/dextromethorphan Oral Liquid 10 milliLiter(s) Oral every 8 hours PRN Cough  LORazepam     Tablet 0.5 milliGRAM(s) Oral every 8 hours PRN Anxiety  melatonin 3 milliGRAM(s) Oral at bedtime PRN Insomnia  ondansetron Injectable 4 milliGRAM(s) IV Push every 8 hours PRN Nausea and/or Vomiting      CAPILLARY BLOOD GLUCOSE      POCT Blood Glucose.: 264 mg/dL (04 Apr 2023 22:20)  POCT Blood Glucose.: 317 mg/dL (04 Apr 2023 17:09)  POCT Blood Glucose.: 254 mg/dL (04 Apr 2023 11:48)  POCT Blood Glucose.: 150 mg/dL (04 Apr 2023 08:28)    I&O's Summary      Vital Signs Last 24 Hrs  T(C): 36.6 (04 Apr 2023 21:50), Max: 36.7 (04 Apr 2023 14:38)  T(F): 97.9 (04 Apr 2023 21:50), Max: 98.1 (04 Apr 2023 14:38)  HR: 80 (05 Apr 2023 04:28) (72 - 90)  BP: 124/70 (04 Apr 2023 21:50) (120/55 - 124/70)  BP(mean): --  RR: 19 (04 Apr 2023 21:50) (18 - 19)  SpO2: 99% (05 Apr 2023 04:28) (97% - 100%)    Parameters below as of 05 Apr 2023 04:28  Patient On (Oxygen Delivery Method): nasal cannula        PHYSICAL EXAM:  GENERAL: NAD, lying in bed comfortable, appears older than chronological age, Nasal cannula in place with no respiratory distress.   HEAD:  Atraumatic, normocephalic  EYES: EOMI, PERRLA, conjunctiva and sclera clear  ENT: dry mucous membranes, white film appreciated on tongue  NECK: Supple, no JVD  HEART: Tachycardic rate, regular rhythm, no murmurs, rubs, or gallops  LUNGS: b/l wheezes heard  ABDOMEN: Soft, nontender, nondistended, +BS  EXTREMITIES: 2+ peripheral pulses bilaterally. No clubbing, cyanosis, or edema  NERVOUS SYSTEM:  A&Ox3, no focal deficits   SKIN: +skin tenting, No rashes or lesions      LABS:                        10.8   7.07  )-----------( 164      ( 05 Apr 2023 05:40 )             36.2      04-05    136  |  101  |  12  ----------------------------<  142<H>  4.6   |  30  |  0.35<L>    Ca    9.5      05 Apr 2023 05:40  Phos  2.8     04-05  Mg     2.10     04-05                RADIOLOGY & ADDITIONAL TESTS:    Imaging Personally Reviewed:    Consultant(s) Notes Reviewed:      Care Discussed with Consultants/Other Providers:

## 2023-04-05 NOTE — PROGRESS NOTE ADULT - PROBLEM SELECTOR PLAN 5
- Home has 7U admelog TID and 8U lantus  - on 7 units premeal TID, ISS, 8 units of lantus qhs - Home has 7U admelog TID and 8U lantus  - on 9 units premeal TID, ISS, 10 units of lantus qhs due to starting prednisone

## 2023-04-05 NOTE — PROVIDER CONTACT NOTE (OTHER) - SITUATION
Patient bladder scan resulted 332cc
patient been tachy during shift, BP up trending, vitals in flow sheet.
pt refusing enema at this time. pt asking why it was ordered. pt states she had a BM today and was willing to take Mirilax ordered.

## 2023-04-05 NOTE — PROGRESS NOTE ADULT - ATTENDING COMMENTS
68 yo F hx primary ciliary dyskinesia, bronchiectasis, and chronic hypoxemic and hypercapnic respiratory on AVAPS p/w exacerbation of bronchiectasis.   Cont to have wheezing on exam but improved from yesterday. Pt ambulated in room w/ PT and did well    -  cont pred 40mg x 5 days  - cont symbicort bid  - cont airway clearance, duonebs q6, hypersal, chest vest and Aerobika   - pt when using  now said the NIV helps with breathing but only used it for 1 hour  - would cont w/ NIV with smaller mask to see if tolerates better     - maintain sat 88-92% while using NC  - Continue Cefepime, complete course for PsA in sputum  - PT/OOB to chair as tolerated  - dvt ppx

## 2023-04-05 NOTE — PROGRESS NOTE ADULT - ASSESSMENT
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 and night time AVAPS w/ frequent episodes of admission due to bronchiectasis, HTN, chronic constipation, presents with bronchiectasis exacerbation    #Bronchiectasis  #Hypoxic Respiratory failure    Recommendations:  - Sputum culture reviewed, growing klebsiella and pseudomonas  - continue with course of cefepime, currently on day 7  - Airway clearance with duonebs q6h, Hypersal, Aerobika BID, Chest vest BID  - Symbicort 160 BID   - Would start prednisone 40mg qd for 5 days, currently on day 2  - avoid dual anticholinergics   - c/w home Singulair  - supplemental O2, avoid hyperoxemia, goal sat >90%, wean as tolerated   - continue with AVAPS at night, patient reports compliance with NIV.  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 and night time AVAPS w/ frequent episodes of admission due to bronchiectasis, HTN, chronic constipation, presents with bronchiectasis exacerbation    #Bronchiectasis  #Hypoxic Respiratory failure    Recommendations:  - Sputum culture reviewed, growing klebsiella and pseudomonas  - continue with course of cefepime, currently on day 7  - Airway clearance with duonebs q6h, mucomyst Aerobika BID, Chest vest BID  - Symbicort 160 BID   - Would start prednisone 40mg qd for 5 days, currently on day 2  - avoid dual anticholinergics   - c/w home Singulair  - supplemental O2, avoid hyperoxemia, goal sat >90%, wean as tolerated   - continue with AVAPS at night, patient reports compliance with NIV.   - please also start hypersal

## 2023-04-05 NOTE — PROVIDER CONTACT NOTE (OTHER) - REASON
Patient D' c/o her increase in anxiety.
pt refuses Enema
tachycardia hypertension
Patient bladder scan resulted 332cc

## 2023-04-05 NOTE — PROVIDER CONTACT NOTE (OTHER) - ACTION/TREATMENT ORDERED:
provider made aware
provider made aware. continue to monitor.
Provider aware, straight cath as per protocol. Will continue to monitor.
med and Tx administered as ordered

## 2023-04-05 NOTE — PROGRESS NOTE ADULT - PROBLEM SELECTOR PLAN 4
- Na 117 at admission, (123 w/ correction)  baseline 135 outpatient, 132; now 130   - ddx hypovolemic 2/2 poor PO intake vs SIADH  - fluid restriction 1.2L  - hyponatremia workup consistent w/ SIADH however patient appears volume down - Na 117 at admission, (123 w/ correction)  baseline 135 outpatient, 132; now 130   - ddx hypovolemic 2/2 poor PO intake vs SIADH  - hyponatremia workup consistent w/ SIADH however patient appears volume down

## 2023-04-05 NOTE — PROGRESS NOTE ADULT - SUBJECTIVE AND OBJECTIVE BOX
CHIEF COMPLAINT:    Interval Events: Pt seen and examined at bedside.     REVIEW OF SYSTEMS:  Constitutional: No fevers or chills. No weight loss. No fatigue or generalised malaise.  Eyes: No itching or discharge from the eyes  ENT: No ear pain. No ear discharge. No nasal congestion. No post nasal drip. No epistaxis. No throat pain. No sore throat. No difficulty swallowing.   CV: No chest pain. No palpitations. No lightheadedness or dizziness.       OBJECTIVE:  ICU Vital Signs Last 24 Hrs  T(C): 36.6 (04 Apr 2023 21:50), Max: 36.7 (04 Apr 2023 14:38)  T(F): 97.9 (04 Apr 2023 21:50), Max: 98.1 (04 Apr 2023 14:38)  HR: 80 (05 Apr 2023 04:28) (72 - 90)  BP: 124/70 (04 Apr 2023 21:50) (120/55 - 124/70)  BP(mean): --  ABP: --  ABP(mean): --  RR: 19 (04 Apr 2023 21:50) (18 - 19)  SpO2: 99% (05 Apr 2023 04:28) (97% - 100%)    O2 Parameters below as of 05 Apr 2023 04:28  Patient On (Oxygen Delivery Method): nasal cannula          Mode: standby    CAPILLARY BLOOD GLUCOSE      POCT Blood Glucose.: 264 mg/dL (04 Apr 2023 22:20)      PHYSICAL EXAM:  GENERAL: NAD, well-groomed, well-developed  HEAD:  Atraumatic, Normocephalic  EYES: EOMI, PERRLA, conjunctiva and sclera clear  ENMT: No tonsillar erythema, exudates, or enlargement; Moist mucous membranes, Good dentition, No lesions  NECK: Supple, No JVD, Normal thyroid  CHEST/LUNG: rhonchi and wheeze   HEART: Regular rate and rhythm; No murmurs, rubs, or gallops  ABDOMEN: Soft, Nontender, Nondistended; Bowel sounds present  VASCULAR:  2+ Peripheral Pulses, No clubbing, cyanosis, or edema  LYMPH: No lymphadenopathy noted  SKIN: No rashes or lesions    HOSPITAL MEDICATIONS:  MEDICATIONS  (STANDING):  acetylcysteine 10%  Inhalation 4 milliLiter(s) Inhalation two times a day  albuterol/ipratropium for Nebulization 3 milliLiter(s) Nebulizer every 6 hours  amLODIPine   Tablet 5 milliGRAM(s) Oral daily  budesonide 160 MICROgram(s)/formoterol 4.5 MICROgram(s) Inhaler 2 Puff(s) Inhalation two times a day  cefepime   IVPB 2000 milliGRAM(s) IV Intermittent every 12 hours  cefepime   IVPB      chlorhexidine 2% Cloths 1 Application(s) Topical daily  dextrose 5%. 1000 milliLiter(s) (50 mL/Hr) IV Continuous <Continuous>  dextrose 5%. 1000 milliLiter(s) (100 mL/Hr) IV Continuous <Continuous>  dextrose 50% Injectable 25 Gram(s) IV Push once  dextrose 50% Injectable 12.5 Gram(s) IV Push once  dextrose 50% Injectable 25 Gram(s) IV Push once  enoxaparin Injectable 30 milliGRAM(s) SubCutaneous every 24 hours  fluticasone propionate 50 MICROgram(s)/spray Nasal Spray 1 Spray(s) Both Nostrils two times a day  glucagon  Injectable 1 milliGRAM(s) IntraMuscular once  guaiFENesin  milliGRAM(s) Oral every 12 hours  influenza  Vaccine (HIGH DOSE) 0.7 milliLiter(s) IntraMuscular once  insulin glargine Injectable (LANTUS) 10 Unit(s) SubCutaneous at bedtime  insulin lispro (ADMELOG) corrective regimen sliding scale   SubCutaneous three times a day before meals  insulin lispro (ADMELOG) corrective regimen sliding scale   SubCutaneous at bedtime  insulin lispro Injectable (ADMELOG) 9 Unit(s) SubCutaneous three times a day before meals  ketotifen 0.025% Ophthalmic Solution 1 Drop(s) Both EYES two times a day  lactobacillus acidophilus 1 Tablet(s) Oral daily  metoprolol succinate ER 50 milliGRAM(s) Oral daily  mirtazapine 7.5 milliGRAM(s) Oral at bedtime  montelukast 10 milliGRAM(s) Oral daily  mupirocin 2% Ointment 1 Application(s) Topical two times a day  nystatin    Suspension [id number] Unit(s) Oral four times a day  pantoprazole    Tablet 40 milliGRAM(s) Oral before breakfast  polyethylene glycol 3350 17 Gram(s) Oral two times a day  predniSONE   Tablet 40 milliGRAM(s) Oral daily  senna 2 Tablet(s) Oral at bedtime  sodium chloride 3%  Inhalation 4 milliLiter(s) Inhalation every 12 hours    MEDICATIONS  (PRN):  acetaminophen     Tablet .. 650 milliGRAM(s) Oral every 6 hours PRN Temp greater or equal to 38C (100.4F), Mild Pain (1 - 3)  aluminum hydroxide/magnesium hydroxide/simethicone Suspension 30 milliLiter(s) Oral every 4 hours PRN Dyspepsia  bisacodyl Suppository 10 milliGRAM(s) Rectal daily PRN Constipation  dextrose Oral Gel 15 Gram(s) Oral once PRN Blood Glucose LESS THAN 70 milliGRAM(s)/deciliter  guaifenesin/dextromethorphan Oral Liquid 10 milliLiter(s) Oral every 8 hours PRN Cough  LORazepam     Tablet 0.5 milliGRAM(s) Oral every 8 hours PRN Anxiety  melatonin 3 milliGRAM(s) Oral at bedtime PRN Insomnia  ondansetron Injectable 4 milliGRAM(s) IV Push every 8 hours PRN Nausea and/or Vomiting      LABS:                        10.8   7.07  )-----------( 164      ( 05 Apr 2023 05:40 )             36.2     04-05    136  |  101  |  12  ----------------------------<  142<H>  4.6   |  30  |  0.35<L>    Ca    9.5      05 Apr 2023 05:40  Phos  2.8     04-05  Mg     2.10     04-05          Arterial Blood Gas:  04-04 @ 16:00  7.32/59/120/30/98.4/3.1  ABG lactate: --    Venous Blood Gas:  04-05 @ 05:46  7.27/79/65/36/93.4  VBG Lactate: --  Venous Blood Gas:  04-04 @ 07:44  7.27/76/141/35/98.7  VBG Lactate: 0.4      MICROBIOLOGY:     RADIOLOGY:  [ ] Reviewed and interpreted by me    Point of Care Ultrasound Findings:    PFT:    EKG:

## 2023-04-05 NOTE — PROGRESS NOTE ADULT - ASSESSMENT
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

## 2023-04-05 NOTE — PROVIDER CONTACT NOTE (OTHER) - RECOMMENDATIONS
Blood gas stat, give remeron early and place pt in AVAP right after blood collection
continue to monitor BMs
giving metoprolol now
straight cath as per protocol

## 2023-04-05 NOTE — PROGRESS NOTE ADULT - PROBLEM SELECTOR PLAN 7
- DVT: lovenox  - diet: soft and bite sized per son w/ 1.2L fluid restriction  - dispo: Home PT   - Communication: 4/2 spoke with son Lilia 10:04AM - DVT: lovenox  - diet: soft and bite sized   - dispo: Home PT   - Communication: 4/3 spoke with daughter

## 2023-04-05 NOTE — PROGRESS NOTE ADULT - PROBLEM SELECTOR PLAN 3
- pt constipated per HPI and CT scan  - no bowel obstruction appreciated on exam, abd soft non-tender  - senna, miralax BID, dulcolax, - pt constipated per HPI and CT scan  - no bowel obstruction appreciated on exam, abd soft non-tender  - senna, miralax BID, dulcolax

## 2023-04-05 NOTE — PROGRESS NOTE ADULT - ATTENDING COMMENTS
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   Patient completing atb today 7 days/7  C/w steroid course   add Mucomyst , AVAPS   F/w pulmonary recs  Bowel regimen for constipation   Bladder scans for urinary retention, start Flomax   F/w labs   Correct electrolytes PRN

## 2023-04-05 NOTE — PROGRESS NOTE ADULT - PROBLEM SELECTOR PLAN 2
- Hx of PCD, difficulty clearing mucus per pt  - aggressive pulm toileting, including chest PT, mucomyst, hypersal, chest vest q12h, aerobika q12h   - c/w symbicort, singular, standing duonebs   - pulm consulted, follow recs   - CT Chest: New clustered nodules/tree-in-bud opacities in the right upper and left lower lobes suggesting infectious etiology with endobronchial spread.  - prednisone 40mg qd x5 doses to end on 4/8  - s/p diamox for metabolic alklalosis - Hx of PCD, difficulty clearing mucus per pt  - aggressive pulm toileting, including chest PT, mucomyst, hypersal, chest vest q12h, aerobika q12h , mucinex  - c/w symbicort, singular, standing duonebs   - pulm consulted, follow recs   - CT Chest: New clustered nodules/tree-in-bud opacities in the right upper and left lower lobes suggesting infectious etiology with endobronchial spread.  - prednisone 40mg qd x5 doses to end on 4/8  - s/p diamox for metabolic alklalosis

## 2023-04-06 DIAGNOSIS — J44.9 CHRONIC OBSTRUCTIVE PULMONARY DISEASE, UNSPECIFIED: ICD-10-CM

## 2023-04-06 DIAGNOSIS — J15.1 PNEUMONIA DUE TO PSEUDOMONAS: ICD-10-CM

## 2023-04-06 LAB
ANION GAP SERPL CALC-SCNC: 8 MMOL/L — SIGNIFICANT CHANGE UP (ref 7–14)
BUN SERPL-MCNC: 22 MG/DL — SIGNIFICANT CHANGE UP (ref 7–23)
CALCIUM SERPL-MCNC: 9.3 MG/DL — SIGNIFICANT CHANGE UP (ref 8.4–10.5)
CHLORIDE SERPL-SCNC: 98 MMOL/L — SIGNIFICANT CHANGE UP (ref 98–107)
CO2 SERPL-SCNC: 30 MMOL/L — SIGNIFICANT CHANGE UP (ref 22–31)
CREAT SERPL-MCNC: 0.34 MG/DL — LOW (ref 0.5–1.3)
EGFR: 113 ML/MIN/1.73M2 — SIGNIFICANT CHANGE UP
GAS PNL BLDV: SIGNIFICANT CHANGE UP
GLUCOSE BLDC GLUCOMTR-MCNC: 155 MG/DL — HIGH (ref 70–99)
GLUCOSE BLDC GLUCOMTR-MCNC: 225 MG/DL — HIGH (ref 70–99)
GLUCOSE BLDC GLUCOMTR-MCNC: 269 MG/DL — HIGH (ref 70–99)
GLUCOSE BLDC GLUCOMTR-MCNC: 357 MG/DL — HIGH (ref 70–99)
GLUCOSE SERPL-MCNC: 225 MG/DL — HIGH (ref 70–99)
HCT VFR BLD CALC: 34.1 % — LOW (ref 34.5–45)
HGB BLD-MCNC: 10.4 G/DL — LOW (ref 11.5–15.5)
MAGNESIUM SERPL-MCNC: 1.9 MG/DL — SIGNIFICANT CHANGE UP (ref 1.6–2.6)
MCHC RBC-ENTMCNC: 26.5 PG — LOW (ref 27–34)
MCHC RBC-ENTMCNC: 30.5 GM/DL — LOW (ref 32–36)
MCV RBC AUTO: 87 FL — SIGNIFICANT CHANGE UP (ref 80–100)
NRBC # BLD: 0 /100 WBCS — SIGNIFICANT CHANGE UP (ref 0–0)
NRBC # FLD: 0 K/UL — SIGNIFICANT CHANGE UP (ref 0–0)
PHOSPHATE SERPL-MCNC: 3.2 MG/DL — SIGNIFICANT CHANGE UP (ref 2.5–4.5)
PLATELET # BLD AUTO: 158 K/UL — SIGNIFICANT CHANGE UP (ref 150–400)
POTASSIUM SERPL-MCNC: 4.4 MMOL/L — SIGNIFICANT CHANGE UP (ref 3.5–5.3)
POTASSIUM SERPL-SCNC: 4.4 MMOL/L — SIGNIFICANT CHANGE UP (ref 3.5–5.3)
RBC # BLD: 3.92 M/UL — SIGNIFICANT CHANGE UP (ref 3.8–5.2)
RBC # FLD: 13.6 % — SIGNIFICANT CHANGE UP (ref 10.3–14.5)
SODIUM SERPL-SCNC: 136 MMOL/L — SIGNIFICANT CHANGE UP (ref 135–145)
WBC # BLD: 6.96 K/UL — SIGNIFICANT CHANGE UP (ref 3.8–10.5)
WBC # FLD AUTO: 6.96 K/UL — SIGNIFICANT CHANGE UP (ref 3.8–10.5)

## 2023-04-06 PROCEDURE — 99232 SBSQ HOSP IP/OBS MODERATE 35: CPT | Mod: GC

## 2023-04-06 RX ORDER — TAMSULOSIN HYDROCHLORIDE 0.4 MG/1
1 CAPSULE ORAL
Qty: 30 | Refills: 0
Start: 2023-04-06 | End: 2023-05-05

## 2023-04-06 RX ORDER — LIDOCAINE AND PRILOCAINE CREAM 25; 25 MG/G; MG/G
0 CREAM TOPICAL
Qty: 0 | Refills: 0 | DISCHARGE

## 2023-04-06 RX ORDER — ACETYLCYSTEINE 200 MG/ML
4 VIAL (ML) MISCELLANEOUS
Qty: 240 | Refills: 0
Start: 2023-04-06 | End: 2023-05-05

## 2023-04-06 RX ORDER — TIOTROPIUM BROMIDE 18 UG/1
1 CAPSULE ORAL; RESPIRATORY (INHALATION)
Qty: 0 | Refills: 0 | DISCHARGE

## 2023-04-06 RX ORDER — GUAIFENESIN/DEXTROMETHORPHAN 600MG-30MG
1 TABLET, EXTENDED RELEASE 12 HR ORAL
Refills: 0 | DISCHARGE

## 2023-04-06 RX ORDER — FUROSEMIDE 40 MG
1 TABLET ORAL
Refills: 0 | DISCHARGE

## 2023-04-06 RX ORDER — POLYETHYLENE GLYCOL 3350 17 G/17G
17 POWDER, FOR SOLUTION ORAL
Qty: 1020 | Refills: 0
Start: 2023-04-06 | End: 2023-05-05

## 2023-04-06 RX ORDER — CEFEPIME 1 G/1
2000 INJECTION, POWDER, FOR SOLUTION INTRAMUSCULAR; INTRAVENOUS EVERY 12 HOURS
Refills: 0 | Status: DISCONTINUED | OUTPATIENT
Start: 2023-04-06 | End: 2023-04-06

## 2023-04-06 RX ORDER — LANOLIN ALCOHOL/MO/W.PET/CERES
2 CREAM (GRAM) TOPICAL
Qty: 14 | Refills: 0
Start: 2023-04-06 | End: 2023-04-12

## 2023-04-06 RX ADMIN — CEFEPIME 100 MILLIGRAM(S): 1 INJECTION, POWDER, FOR SOLUTION INTRAMUSCULAR; INTRAVENOUS at 05:48

## 2023-04-06 RX ADMIN — CHLORHEXIDINE GLUCONATE 1 APPLICATION(S): 213 SOLUTION TOPICAL at 22:57

## 2023-04-06 RX ADMIN — BUDESONIDE AND FORMOTEROL FUMARATE DIHYDRATE 2 PUFF(S): 160; 4.5 AEROSOL RESPIRATORY (INHALATION) at 08:42

## 2023-04-06 RX ADMIN — Medication 5: at 12:48

## 2023-04-06 RX ADMIN — MIRTAZAPINE 7.5 MILLIGRAM(S): 45 TABLET, ORALLY DISINTEGRATING ORAL at 22:54

## 2023-04-06 RX ADMIN — AMLODIPINE BESYLATE 5 MILLIGRAM(S): 2.5 TABLET ORAL at 05:49

## 2023-04-06 RX ADMIN — SODIUM CHLORIDE 4 MILLILITER(S): 9 INJECTION INTRAMUSCULAR; INTRAVENOUS; SUBCUTANEOUS at 09:19

## 2023-04-06 RX ADMIN — Medication 6 MILLIGRAM(S): at 22:54

## 2023-04-06 RX ADMIN — MONTELUKAST 10 MILLIGRAM(S): 4 TABLET, CHEWABLE ORAL at 12:51

## 2023-04-06 RX ADMIN — Medication 1 SPRAY(S): at 05:49

## 2023-04-06 RX ADMIN — Medication 600 MILLIGRAM(S): at 05:49

## 2023-04-06 RX ADMIN — Medication 9 UNIT(S): at 08:44

## 2023-04-06 RX ADMIN — Medication 500000 UNIT(S): at 12:50

## 2023-04-06 RX ADMIN — Medication 40 MILLIGRAM(S): at 05:48

## 2023-04-06 RX ADMIN — Medication 3 MILLILITER(S): at 09:18

## 2023-04-06 RX ADMIN — Medication 500000 UNIT(S): at 22:53

## 2023-04-06 RX ADMIN — MUPIROCIN 1 APPLICATION(S): 20 OINTMENT TOPICAL at 05:44

## 2023-04-06 RX ADMIN — TAMSULOSIN HYDROCHLORIDE 0.4 MILLIGRAM(S): 0.4 CAPSULE ORAL at 22:54

## 2023-04-06 RX ADMIN — Medication 500000 UNIT(S): at 17:59

## 2023-04-06 RX ADMIN — BUDESONIDE AND FORMOTEROL FUMARATE DIHYDRATE 2 PUFF(S): 160; 4.5 AEROSOL RESPIRATORY (INHALATION) at 22:47

## 2023-04-06 RX ADMIN — Medication 9 UNIT(S): at 18:03

## 2023-04-06 RX ADMIN — KETOTIFEN FUMARATE 1 DROP(S): 0.34 SOLUTION OPHTHALMIC at 05:44

## 2023-04-06 RX ADMIN — PANTOPRAZOLE SODIUM 40 MILLIGRAM(S): 20 TABLET, DELAYED RELEASE ORAL at 05:49

## 2023-04-06 RX ADMIN — Medication 50 MILLIGRAM(S): at 05:49

## 2023-04-06 RX ADMIN — INSULIN GLARGINE 10 UNIT(S): 100 INJECTION, SOLUTION SUBCUTANEOUS at 22:49

## 2023-04-06 RX ADMIN — Medication 9 UNIT(S): at 12:50

## 2023-04-06 RX ADMIN — Medication 1 SPRAY(S): at 18:00

## 2023-04-06 RX ADMIN — POLYETHYLENE GLYCOL 3350 17 GRAM(S): 17 POWDER, FOR SOLUTION ORAL at 17:59

## 2023-04-06 RX ADMIN — Medication 2: at 18:02

## 2023-04-06 RX ADMIN — Medication 500000 UNIT(S): at 02:15

## 2023-04-06 RX ADMIN — Medication 1 TABLET(S): at 12:50

## 2023-04-06 RX ADMIN — Medication 30 MILLILITER(S): at 18:37

## 2023-04-06 RX ADMIN — ENOXAPARIN SODIUM 30 MILLIGRAM(S): 100 INJECTION SUBCUTANEOUS at 22:48

## 2023-04-06 RX ADMIN — MUPIROCIN 1 APPLICATION(S): 20 OINTMENT TOPICAL at 18:00

## 2023-04-06 RX ADMIN — SENNA PLUS 2 TABLET(S): 8.6 TABLET ORAL at 22:54

## 2023-04-06 RX ADMIN — Medication 3 MILLILITER(S): at 15:06

## 2023-04-06 RX ADMIN — POLYETHYLENE GLYCOL 3350 17 GRAM(S): 17 POWDER, FOR SOLUTION ORAL at 05:48

## 2023-04-06 RX ADMIN — Medication 500000 UNIT(S): at 05:48

## 2023-04-06 RX ADMIN — Medication 3 MILLILITER(S): at 04:35

## 2023-04-06 RX ADMIN — Medication 4 MILLILITER(S): at 09:22

## 2023-04-06 RX ADMIN — KETOTIFEN FUMARATE 1 DROP(S): 0.34 SOLUTION OPHTHALMIC at 22:50

## 2023-04-06 RX ADMIN — Medication 3: at 08:43

## 2023-04-06 NOTE — PROGRESS NOTE ADULT - PROBLEM SELECTOR PLAN 6
- Hold lasix iso hypo-Na  - c/w amlodipine 5 w/ holding parameters  - c/w metoprolol succinate 50 mg qd home dose - Home has 7U admelog TID and 8U lantus  - on 9 units premeal TID, ISS, 10 units of lantus qhs due to starting prednisone - Na 117 at admission, (123 w/ correction)  baseline 135 outpatient, 132; now 130   - ddx hypovolemic 2/2 poor PO intake vs SIADH  - hyponatremia workup consistent w/ SIADH however patient appears volume down

## 2023-04-06 NOTE — PROGRESS NOTE ADULT - PROBLEM SELECTOR PLAN 8
- DVT: lovenox  - diet: soft and bite sized   - dispo: Home PT   - Communication: 4/3 spoke with daughter - Hold lasix iso hypo-Na  - c/w amlodipine 5 w/ holding parameters  - c/w metoprolol succinate 50 mg qd home dose

## 2023-04-06 NOTE — PROGRESS NOTE ADULT - PROBLEM SELECTOR PLAN 4
- Na 117 at admission, (123 w/ correction)  baseline 135 outpatient, 132; now 130   - ddx hypovolemic 2/2 poor PO intake vs SIADH  - hyponatremia workup consistent w/ SIADH however patient appears volume down - pt constipated per HPI and CT scan  - no bowel obstruction appreciated on exam, abd soft non-tender  - senna, miralax BID, dulcolax - Hx of PCD, difficulty clearing mucus per pt  - aggressive pulm toileting, including chest PT, mucomyst, hypersal, chest vest q12h, aerobika q12h , mucinex  - c/w symbicort, singular, standing duonebs   - pulm consulted, follow recs   - CT Chest: New clustered nodules/tree-in-bud opacities in the right upper and left lower lobes suggesting infectious etiology with endobronchial spread.  - prednisone 40mg qd x5 doses to end on 4/8  - s/p diamox for metabolic alklalosis

## 2023-04-06 NOTE — PROGRESS NOTE ADULT - PROBLEM SELECTOR PLAN 3
- pt constipated per HPI and CT scan  - no bowel obstruction appreciated on exam, abd soft non-tender  - senna, miralax BID, dulcolax - Hx of PCD, difficulty clearing mucus per pt  - aggressive pulm toileting, including chest PT, mucomyst, hypersal, chest vest q12h, aerobika q12h , mucinex  - c/w symbicort, singular, standing duonebs   - pulm consulted, follow recs   - CT Chest: New clustered nodules/tree-in-bud opacities in the right upper and left lower lobes suggesting infectious etiology with endobronchial spread.  - prednisone 40mg qd x5 doses to end on 4/8  - s/p diamox for metabolic alklalosis Likely leading to pt's septic presentation    - s/p cefepime x7d  - trend WBC, fever curve    RESOLVED

## 2023-04-06 NOTE — PROGRESS NOTE ADULT - PROBLEM SELECTOR PLAN 1
RESOLVED    - pt met sepsis criteria due to elevated WBC, increased WOB (RR), and elevated lactate  - s/p ceftriaxone in the ED; now on cefepime (day 4/7); dc azithromycin (U legionella neg)  - outpt record showed colonization of sputum w/ Pseudomonas and Klebsiella  - Bcx NGTD, Scx NGTD RESOLVED    - pt met sepsis criteria due to elevated WBC, increased WOB (RR), and elevated lactate  - s/p ceftriaxone in the ED; s/p cefepime x7d ; dc azithromycin (U legionella neg)  - outpt record showed colonization of sputum w/ Pseudomonas and Klebsiella  - Bcx NGTD, Scx NGTD

## 2023-04-06 NOTE — PROGRESS NOTE ADULT - PROBLEM SELECTOR PLAN 2
- Hx of PCD, difficulty clearing mucus per pt  - aggressive pulm toileting, including chest PT, mucomyst, hypersal, chest vest q12h, aerobika q12h , mucinex  - c/w symbicort, singular, standing duonebs   - pulm consulted, follow recs   - CT Chest: New clustered nodules/tree-in-bud opacities in the right upper and left lower lobes suggesting infectious etiology with endobronchial spread.  - prednisone 40mg qd x5 doses to end on 4/8  - s/p diamox for metabolic alklalosis Likely leading to pt's septic presentation    - s/p cefepime x7d  - trend WBC, fever curve    RESOLVED Patient with COPD 2/2 primary ciliary dyskinesia    - continue symbicort, chest PT, mucomyst, hypersal, chest vest q12h, aerobika q12h , mucinex  - c/w symbicort, singular, standing duonebs   - will require AVAPS on discharge due to CO2 retention  - pulse oximetry overnight

## 2023-04-06 NOTE — PROGRESS NOTE ADULT - PROBLEM SELECTOR PLAN 5
- Home has 7U admelog TID and 8U lantus  - on 9 units premeal TID, ISS, 10 units of lantus qhs due to starting prednisone - Na 117 at admission, (123 w/ correction)  baseline 135 outpatient, 132; now 130   - ddx hypovolemic 2/2 poor PO intake vs SIADH  - hyponatremia workup consistent w/ SIADH however patient appears volume down - pt constipated per HPI and CT scan  - no bowel obstruction appreciated on exam, abd soft non-tender  - senna, miralax BID, dulcolax

## 2023-04-06 NOTE — PROGRESS NOTE ADULT - PROBLEM SELECTOR PROBLEM 2
Primary ciliary dyskinesia Pneumonia due to Pseudomonas Chronic obstructive pulmonary disease (COPD)

## 2023-04-06 NOTE — PROGRESS NOTE ADULT - ATTENDING COMMENTS
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  Pending AVAPS approval for dispo home.  Patient needs AvAPS for DC, c/w steroid course , correct hyperglycemia   Completed atb course   DC planning, d/w daughter, daughter in law at bedside

## 2023-04-06 NOTE — PROGRESS NOTE ADULT - PROBLEM SELECTOR PLAN 7
- DVT: lovenox  - diet: soft and bite sized   - dispo: Home PT   - Communication: 4/3 spoke with daughter - Hold lasix iso hypo-Na  - c/w amlodipine 5 w/ holding parameters  - c/w metoprolol succinate 50 mg qd home dose - Home has 7U admelog TID and 8U lantus  - on 9 units premeal TID, ISS, 10 units of lantus qhs due to starting prednisone

## 2023-04-06 NOTE — PROGRESS NOTE ADULT - SUBJECTIVE AND OBJECTIVE BOX
Patient is a 68y old  Female who presents with a chief complaint of SOB (05 Apr 2023 07:12)      SUBJECTIVE / OVERNIGHT EVENTS:    MEDICATIONS  (STANDING):  acetylcysteine 10%  Inhalation 4 milliLiter(s) Inhalation two times a day  albuterol/ipratropium for Nebulization 3 milliLiter(s) Nebulizer every 6 hours  amLODIPine   Tablet 5 milliGRAM(s) Oral daily  budesonide 160 MICROgram(s)/formoterol 4.5 MICROgram(s) Inhaler 2 Puff(s) Inhalation two times a day  cefepime   IVPB 2000 milliGRAM(s) IV Intermittent every 12 hours  chlorhexidine 2% Cloths 1 Application(s) Topical daily  dextrose 5%. 1000 milliLiter(s) (50 mL/Hr) IV Continuous <Continuous>  dextrose 5%. 1000 milliLiter(s) (100 mL/Hr) IV Continuous <Continuous>  dextrose 50% Injectable 25 Gram(s) IV Push once  dextrose 50% Injectable 12.5 Gram(s) IV Push once  dextrose 50% Injectable 25 Gram(s) IV Push once  enoxaparin Injectable 30 milliGRAM(s) SubCutaneous every 24 hours  fluticasone propionate 50 MICROgram(s)/spray Nasal Spray 1 Spray(s) Both Nostrils two times a day  glucagon  Injectable 1 milliGRAM(s) IntraMuscular once  guaiFENesin  milliGRAM(s) Oral every 12 hours  influenza  Vaccine (HIGH DOSE) 0.7 milliLiter(s) IntraMuscular once  insulin glargine Injectable (LANTUS) 10 Unit(s) SubCutaneous at bedtime  insulin lispro (ADMELOG) corrective regimen sliding scale   SubCutaneous three times a day before meals  insulin lispro (ADMELOG) corrective regimen sliding scale   SubCutaneous at bedtime  insulin lispro Injectable (ADMELOG) 9 Unit(s) SubCutaneous three times a day before meals  ketotifen 0.025% Ophthalmic Solution 1 Drop(s) Both EYES two times a day  lactobacillus acidophilus 1 Tablet(s) Oral daily  melatonin 6 milliGRAM(s) Oral at bedtime  metoprolol succinate ER 50 milliGRAM(s) Oral daily  mirtazapine 7.5 milliGRAM(s) Oral at bedtime  montelukast 10 milliGRAM(s) Oral daily  mupirocin 2% Ointment 1 Application(s) Topical two times a day  nystatin    Suspension 930244 Unit(s) Oral four times a day  pantoprazole    Tablet 40 milliGRAM(s) Oral before breakfast  polyethylene glycol 3350 17 Gram(s) Oral two times a day  predniSONE   Tablet 40 milliGRAM(s) Oral daily  senna 2 Tablet(s) Oral at bedtime  sodium chloride 3%  Inhalation 4 milliLiter(s) Inhalation every 12 hours  tamsulosin 0.4 milliGRAM(s) Oral at bedtime    MEDICATIONS  (PRN):  acetaminophen     Tablet .. 650 milliGRAM(s) Oral every 6 hours PRN Temp greater or equal to 38C (100.4F), Mild Pain (1 - 3)  ALPRAZolam 0.25 milliGRAM(s) Oral every 8 hours PRN anxiety  aluminum hydroxide/magnesium hydroxide/simethicone Suspension 30 milliLiter(s) Oral every 4 hours PRN Dyspepsia  bisacodyl Suppository 10 milliGRAM(s) Rectal daily PRN Constipation  dextrose Oral Gel 15 Gram(s) Oral once PRN Blood Glucose LESS THAN 70 milliGRAM(s)/deciliter  ondansetron Injectable 4 milliGRAM(s) IV Push every 8 hours PRN Nausea and/or Vomiting      CAPILLARY BLOOD GLUCOSE      POCT Blood Glucose.: 73 mg/dL (05 Apr 2023 21:53)  POCT Blood Glucose.: 286 mg/dL (05 Apr 2023 17:11)  POCT Blood Glucose.: 292 mg/dL (05 Apr 2023 12:43)  POCT Blood Glucose.: 243 mg/dL (05 Apr 2023 08:41)    I&O's Summary    05 Apr 2023 07:01  -  06 Apr 2023 07:00  --------------------------------------------------------  IN: 500 mL / OUT: 400 mL / NET: 100 mL        Vital Signs Last 24 Hrs  T(C): 36.4 (06 Apr 2023 05:16), Max: 36.8 (05 Apr 2023 14:52)  T(F): 97.6 (06 Apr 2023 05:16), Max: 98.3 (05 Apr 2023 14:52)  HR: 81 (06 Apr 2023 05:16) (72 - 82)  BP: 132/74 (06 Apr 2023 05:16) (122/56 - 133/68)  BP(mean): --  RR: 17 (06 Apr 2023 05:16) (17 - 19)  SpO2: 100% (06 Apr 2023 05:16) (97% - 100%)    Parameters below as of 06 Apr 2023 05:16  Patient On (Oxygen Delivery Method): room air        PHYSICAL EXAM:  GENERAL: NAD, well-developed  HEAD: Atraumatic, Normocephalic  EYES: EOMI, PERRLA, conjunctiva and sclera clear  NECK: Supple, No JVD  CHEST/LUNG: Clear to auscultation bilaterally; No wheezes or crackles  HEART: Normal S1/S2; Regular rate and rhythm; No murmurs, rubs, or gallops  ABDOMEN: Soft, Nontender, Nondistended; Bowel sounds present  EXTREMITIES: 2+ Peripheral Pulses; No clubbing, cyanosis, or edema  PSYCH: A&Ox3  NEUROLOGY: no focal neurologic deficit  SKIN: No rashes or lesions    LABS:                        10.4   6.96  )-----------( 158      ( 06 Apr 2023 06:17 )             34.1      04-06    136  |  98  |  22  ----------------------------<  225<H>  4.4   |  30  |  0.34<L>    Ca    9.3      06 Apr 2023 06:17  Phos  3.2     04-06  Mg     1.90     04-06                RADIOLOGY & ADDITIONAL TESTS:    Imaging Personally Reviewed:    Consultant(s) Notes Reviewed:      Care Discussed with Consultants/Other Providers:   Patient is a 68y old  Female who presents with a chief complaint of SOB (05 Apr 2023 07:12)      SUBJECTIVE / OVERNIGHT EVENTS:    No o/n events.  Patient reported that she is feeling better but had some SOB on walking to the bathroom.    MEDICATIONS  (STANDING):  acetylcysteine 10%  Inhalation 4 milliLiter(s) Inhalation two times a day  albuterol/ipratropium for Nebulization 3 milliLiter(s) Nebulizer every 6 hours  amLODIPine   Tablet 5 milliGRAM(s) Oral daily  budesonide 160 MICROgram(s)/formoterol 4.5 MICROgram(s) Inhaler 2 Puff(s) Inhalation two times a day  cefepime   IVPB 2000 milliGRAM(s) IV Intermittent every 12 hours  chlorhexidine 2% Cloths 1 Application(s) Topical daily  dextrose 5%. 1000 milliLiter(s) (50 mL/Hr) IV Continuous <Continuous>  dextrose 5%. 1000 milliLiter(s) (100 mL/Hr) IV Continuous <Continuous>  dextrose 50% Injectable 25 Gram(s) IV Push once  dextrose 50% Injectable 12.5 Gram(s) IV Push once  dextrose 50% Injectable 25 Gram(s) IV Push once  enoxaparin Injectable 30 milliGRAM(s) SubCutaneous every 24 hours  fluticasone propionate 50 MICROgram(s)/spray Nasal Spray 1 Spray(s) Both Nostrils two times a day  glucagon  Injectable 1 milliGRAM(s) IntraMuscular once  guaiFENesin  milliGRAM(s) Oral every 12 hours  influenza  Vaccine (HIGH DOSE) 0.7 milliLiter(s) IntraMuscular once  insulin glargine Injectable (LANTUS) 10 Unit(s) SubCutaneous at bedtime  insulin lispro (ADMELOG) corrective regimen sliding scale   SubCutaneous three times a day before meals  insulin lispro (ADMELOG) corrective regimen sliding scale   SubCutaneous at bedtime  insulin lispro Injectable (ADMELOG) 9 Unit(s) SubCutaneous three times a day before meals  ketotifen 0.025% Ophthalmic Solution 1 Drop(s) Both EYES two times a day  lactobacillus acidophilus 1 Tablet(s) Oral daily  melatonin 6 milliGRAM(s) Oral at bedtime  metoprolol succinate ER 50 milliGRAM(s) Oral daily  mirtazapine 7.5 milliGRAM(s) Oral at bedtime  montelukast 10 milliGRAM(s) Oral daily  mupirocin 2% Ointment 1 Application(s) Topical two times a day  nystatin    Suspension 291446 Unit(s) Oral four times a day  pantoprazole    Tablet 40 milliGRAM(s) Oral before breakfast  polyethylene glycol 3350 17 Gram(s) Oral two times a day  predniSONE   Tablet 40 milliGRAM(s) Oral daily  senna 2 Tablet(s) Oral at bedtime  sodium chloride 3%  Inhalation 4 milliLiter(s) Inhalation every 12 hours  tamsulosin 0.4 milliGRAM(s) Oral at bedtime    MEDICATIONS  (PRN):  acetaminophen     Tablet .. 650 milliGRAM(s) Oral every 6 hours PRN Temp greater or equal to 38C (100.4F), Mild Pain (1 - 3)  ALPRAZolam 0.25 milliGRAM(s) Oral every 8 hours PRN anxiety  aluminum hydroxide/magnesium hydroxide/simethicone Suspension 30 milliLiter(s) Oral every 4 hours PRN Dyspepsia  bisacodyl Suppository 10 milliGRAM(s) Rectal daily PRN Constipation  dextrose Oral Gel 15 Gram(s) Oral once PRN Blood Glucose LESS THAN 70 milliGRAM(s)/deciliter  ondansetron Injectable 4 milliGRAM(s) IV Push every 8 hours PRN Nausea and/or Vomiting      CAPILLARY BLOOD GLUCOSE      POCT Blood Glucose.: 73 mg/dL (05 Apr 2023 21:53)  POCT Blood Glucose.: 286 mg/dL (05 Apr 2023 17:11)  POCT Blood Glucose.: 292 mg/dL (05 Apr 2023 12:43)  POCT Blood Glucose.: 243 mg/dL (05 Apr 2023 08:41)    I&O's Summary    05 Apr 2023 07:01  -  06 Apr 2023 07:00  --------------------------------------------------------  IN: 500 mL / OUT: 400 mL / NET: 100 mL        Vital Signs Last 24 Hrs  T(C): 36.4 (06 Apr 2023 05:16), Max: 36.8 (05 Apr 2023 14:52)  T(F): 97.6 (06 Apr 2023 05:16), Max: 98.3 (05 Apr 2023 14:52)  HR: 81 (06 Apr 2023 05:16) (72 - 82)  BP: 132/74 (06 Apr 2023 05:16) (122/56 - 133/68)  BP(mean): --  RR: 17 (06 Apr 2023 05:16) (17 - 19)  SpO2: 100% (06 Apr 2023 05:16) (97% - 100%)    Parameters below as of 06 Apr 2023 05:16  Patient On (Oxygen Delivery Method): room air        PHYSICAL EXAM:  GENERAL: NAD, well-developed  HEAD: Atraumatic, Normocephalic  EYES: EOMI, PERRLA, conjunctiva and sclera clear  NECK: Supple, No JVD  CHEST/LUNG: Clear to auscultation bilaterally; No wheezes or crackles  HEART: Normal S1/S2; Regular rate and rhythm; No murmurs, rubs, or gallops  ABDOMEN: Soft, Nontender, Nondistended; Bowel sounds present  EXTREMITIES: 2+ Peripheral Pulses; No clubbing, cyanosis, or edema  PSYCH: A&Ox3  NEUROLOGY: no focal neurologic deficit  SKIN: No rashes or lesions    LABS:                        10.4   6.96  )-----------( 158      ( 06 Apr 2023 06:17 )             34.1      04-06    136  |  98  |  22  ----------------------------<  225<H>  4.4   |  30  |  0.34<L>    Ca    9.3      06 Apr 2023 06:17  Phos  3.2     04-06  Mg     1.90     04-06                RADIOLOGY & ADDITIONAL TESTS:    Imaging Personally Reviewed:    Consultant(s) Notes Reviewed:      Care Discussed with Consultants/Other Providers:

## 2023-04-06 NOTE — PROGRESS NOTE ADULT - ASSESSMENT
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  Pending AVAPS approval for dispo home.

## 2023-04-07 LAB
GLUCOSE BLDC GLUCOMTR-MCNC: 165 MG/DL — HIGH (ref 70–99)
GLUCOSE BLDC GLUCOMTR-MCNC: 168 MG/DL — HIGH (ref 70–99)
GLUCOSE BLDC GLUCOMTR-MCNC: 176 MG/DL — HIGH (ref 70–99)
GLUCOSE BLDC GLUCOMTR-MCNC: 233 MG/DL — HIGH (ref 70–99)

## 2023-04-07 PROCEDURE — 99232 SBSQ HOSP IP/OBS MODERATE 35: CPT | Mod: GC

## 2023-04-07 PROCEDURE — 93010 ELECTROCARDIOGRAM REPORT: CPT

## 2023-04-07 PROCEDURE — 99233 SBSQ HOSP IP/OBS HIGH 50: CPT | Mod: GC

## 2023-04-07 RX ORDER — AZITHROMYCIN 500 MG/1
500 TABLET, FILM COATED ORAL
Refills: 0 | Status: DISCONTINUED | OUTPATIENT
Start: 2023-04-07 | End: 2023-04-11

## 2023-04-07 RX ADMIN — SENNA PLUS 2 TABLET(S): 8.6 TABLET ORAL at 22:20

## 2023-04-07 RX ADMIN — MUPIROCIN 1 APPLICATION(S): 20 OINTMENT TOPICAL at 05:54

## 2023-04-07 RX ADMIN — Medication 9 UNIT(S): at 12:28

## 2023-04-07 RX ADMIN — Medication 3 MILLILITER(S): at 15:26

## 2023-04-07 RX ADMIN — POLYETHYLENE GLYCOL 3350 17 GRAM(S): 17 POWDER, FOR SOLUTION ORAL at 18:02

## 2023-04-07 RX ADMIN — KETOTIFEN FUMARATE 1 DROP(S): 0.34 SOLUTION OPHTHALMIC at 05:54

## 2023-04-07 RX ADMIN — AMLODIPINE BESYLATE 5 MILLIGRAM(S): 2.5 TABLET ORAL at 05:53

## 2023-04-07 RX ADMIN — Medication 500000 UNIT(S): at 18:02

## 2023-04-07 RX ADMIN — Medication 9 UNIT(S): at 18:00

## 2023-04-07 RX ADMIN — BUDESONIDE AND FORMOTEROL FUMARATE DIHYDRATE 2 PUFF(S): 160; 4.5 AEROSOL RESPIRATORY (INHALATION) at 22:19

## 2023-04-07 RX ADMIN — Medication 650 MILLIGRAM(S): at 22:18

## 2023-04-07 RX ADMIN — Medication 500000 UNIT(S): at 12:29

## 2023-04-07 RX ADMIN — Medication 30 MILLILITER(S): at 12:32

## 2023-04-07 RX ADMIN — Medication 40 MILLIGRAM(S): at 05:52

## 2023-04-07 RX ADMIN — Medication 3 MILLILITER(S): at 22:02

## 2023-04-07 RX ADMIN — Medication 50 MILLIGRAM(S): at 05:53

## 2023-04-07 RX ADMIN — INSULIN GLARGINE 10 UNIT(S): 100 INJECTION, SOLUTION SUBCUTANEOUS at 22:20

## 2023-04-07 RX ADMIN — BUDESONIDE AND FORMOTEROL FUMARATE DIHYDRATE 2 PUFF(S): 160; 4.5 AEROSOL RESPIRATORY (INHALATION) at 08:41

## 2023-04-07 RX ADMIN — POLYETHYLENE GLYCOL 3350 17 GRAM(S): 17 POWDER, FOR SOLUTION ORAL at 05:53

## 2023-04-07 RX ADMIN — Medication 650 MILLIGRAM(S): at 22:48

## 2023-04-07 RX ADMIN — Medication 1 SPRAY(S): at 05:52

## 2023-04-07 RX ADMIN — MUPIROCIN 1 APPLICATION(S): 20 OINTMENT TOPICAL at 18:00

## 2023-04-07 RX ADMIN — Medication 3 MILLILITER(S): at 07:32

## 2023-04-07 RX ADMIN — AZITHROMYCIN 500 MILLIGRAM(S): 500 TABLET, FILM COATED ORAL at 20:10

## 2023-04-07 RX ADMIN — Medication 1 TABLET(S): at 12:29

## 2023-04-07 RX ADMIN — Medication 1 SPRAY(S): at 18:01

## 2023-04-07 RX ADMIN — Medication 500000 UNIT(S): at 05:53

## 2023-04-07 RX ADMIN — Medication 1: at 12:28

## 2023-04-07 RX ADMIN — TAMSULOSIN HYDROCHLORIDE 0.4 MILLIGRAM(S): 0.4 CAPSULE ORAL at 22:18

## 2023-04-07 RX ADMIN — SODIUM CHLORIDE 4 MILLILITER(S): 9 INJECTION INTRAMUSCULAR; INTRAVENOUS; SUBCUTANEOUS at 07:32

## 2023-04-07 RX ADMIN — Medication 6 MILLIGRAM(S): at 22:21

## 2023-04-07 RX ADMIN — KETOTIFEN FUMARATE 1 DROP(S): 0.34 SOLUTION OPHTHALMIC at 22:20

## 2023-04-07 RX ADMIN — ENOXAPARIN SODIUM 30 MILLIGRAM(S): 100 INJECTION SUBCUTANEOUS at 22:19

## 2023-04-07 RX ADMIN — Medication 9 UNIT(S): at 08:40

## 2023-04-07 RX ADMIN — MIRTAZAPINE 7.5 MILLIGRAM(S): 45 TABLET, ORALLY DISINTEGRATING ORAL at 22:19

## 2023-04-07 RX ADMIN — CHLORHEXIDINE GLUCONATE 1 APPLICATION(S): 213 SOLUTION TOPICAL at 22:20

## 2023-04-07 RX ADMIN — Medication 2: at 08:40

## 2023-04-07 RX ADMIN — PANTOPRAZOLE SODIUM 40 MILLIGRAM(S): 20 TABLET, DELAYED RELEASE ORAL at 05:55

## 2023-04-07 RX ADMIN — SODIUM CHLORIDE 4 MILLILITER(S): 9 INJECTION INTRAMUSCULAR; INTRAVENOUS; SUBCUTANEOUS at 22:01

## 2023-04-07 RX ADMIN — Medication 3 MILLILITER(S): at 04:17

## 2023-04-07 RX ADMIN — Medication 1: at 18:00

## 2023-04-07 RX ADMIN — MONTELUKAST 10 MILLIGRAM(S): 4 TABLET, CHEWABLE ORAL at 12:29

## 2023-04-07 NOTE — PROGRESS NOTE ADULT - PROBLEM SELECTOR PLAN 1
RESOLVED    - pt met sepsis criteria due to elevated WBC, increased WOB (RR), and elevated lactate  - s/p ceftriaxone in the ED; s/p cefepime x7d ; dc azithromycin (U legionella neg)  - outpt record showed colonization of sputum w/ Pseudomonas and Klebsiella  - Bcx NGTD, Scx NGTD

## 2023-04-07 NOTE — PROGRESS NOTE ADULT - PROBLEM SELECTOR PLAN 5
- pt constipated per HPI and CT scan  - no bowel obstruction appreciated on exam, abd soft non-tender  - senna, miralax BID, dulcolax

## 2023-04-07 NOTE — PROGRESS NOTE ADULT - TIME BILLING
review of laboratory data, radiology results, discussion with primary team\patient, and monitoring for potential decompensation. Interventions were performed as documented above

## 2023-04-07 NOTE — PROGRESS NOTE ADULT - PROBLEM SELECTOR PLAN 6
- Na 117 at admission, (123 w/ correction)  baseline 135 outpatient, 132; now 130   - ddx hypovolemic 2/2 poor PO intake vs SIADH  - hyponatremia workup consistent w/ SIADH however patient appears volume down

## 2023-04-07 NOTE — PROGRESS NOTE ADULT - PROBLEM SELECTOR PLAN 3
Likely leading to pt's septic presentation    - s/p cefepime x7d  - trend WBC, fever curve    RESOLVED

## 2023-04-07 NOTE — PROGRESS NOTE ADULT - SUBJECTIVE AND OBJECTIVE BOX
CHIEF COMPLAINT:    Interval Events: seen and examined at bedside.     REVIEW OF SYSTEMS:  Constitutional: No fevers or chills. No weight loss. No fatigue or generalised malaise.  Eyes: No itching or discharge from the eyes  ENT: No ear pain. No ear discharge. No nasal congestion. No post nasal drip. No epistaxis. No throat pain. No sore throat. No difficulty swallowing.       OBJECTIVE:  ICU Vital Signs Last 24 Hrs  T(C): 36.6 (07 Apr 2023 06:00), Max: 36.7 (06 Apr 2023 12:11)  T(F): 97.9 (07 Apr 2023 06:00), Max: 98 (06 Apr 2023 12:11)  HR: 84 (07 Apr 2023 06:00) (72 - 98)  BP: 143/76 (07 Apr 2023 06:00) (102/51 - 143/77)  BP(mean): --  ABP: --  ABP(mean): --  RR: 18 (07 Apr 2023 06:00) (17 - 18)  SpO2: 100% (07 Apr 2023 06:00) (97% - 100%)    O2 Parameters below as of 07 Apr 2023 06:00  Patient On (Oxygen Delivery Method): nasal cannula  O2 Flow (L/min): 2        Mode: standby    CAPILLARY BLOOD GLUCOSE      POCT Blood Glucose.: 155 mg/dL (06 Apr 2023 22:11)      PHYSICAL EXAM:  GENERAL: NAD, well-groomed, well-developed  HEAD:  Atraumatic, Normocephalic  EYES: EOMI, PERRLA, conjunctiva and sclera clear  ENMT: No tonsillar erythema, exudates, or enlargement; Moist mucous membranes, Good dentition, No lesions  NECK: Supple, No JVD, Normal thyroid  CHEST/LUNG: rhonchi and wheeze   HEART: Regular rate and rhythm; No murmurs, rubs, or gallops  ABDOMEN: Soft, Nontender, Nondistended; Bowel sounds present  VASCULAR:  2+ Peripheral Pulses, No clubbing, cyanosis, or edema  LYMPH: No lymphadenopathy n    HOSPITAL MEDICATIONS:  MEDICATIONS  (STANDING):  acetylcysteine 10%  Inhalation 4 milliLiter(s) Inhalation two times a day  albuterol/ipratropium for Nebulization 3 milliLiter(s) Nebulizer every 6 hours  amLODIPine   Tablet 5 milliGRAM(s) Oral daily  budesonide 160 MICROgram(s)/formoterol 4.5 MICROgram(s) Inhaler 2 Puff(s) Inhalation two times a day  chlorhexidine 2% Cloths 1 Application(s) Topical daily  dextrose 5%. 1000 milliLiter(s) (100 mL/Hr) IV Continuous <Continuous>  dextrose 5%. 1000 milliLiter(s) (50 mL/Hr) IV Continuous <Continuous>  dextrose 50% Injectable 25 Gram(s) IV Push once  dextrose 50% Injectable 12.5 Gram(s) IV Push once  dextrose 50% Injectable 25 Gram(s) IV Push once  enoxaparin Injectable 30 milliGRAM(s) SubCutaneous every 24 hours  fluticasone propionate 50 MICROgram(s)/spray Nasal Spray 1 Spray(s) Both Nostrils two times a day  glucagon  Injectable 1 milliGRAM(s) IntraMuscular once  influenza  Vaccine (HIGH DOSE) 0.7 milliLiter(s) IntraMuscular once  insulin glargine Injectable (LANTUS) 10 Unit(s) SubCutaneous at bedtime  insulin lispro (ADMELOG) corrective regimen sliding scale   SubCutaneous three times a day before meals  insulin lispro (ADMELOG) corrective regimen sliding scale   SubCutaneous at bedtime  insulin lispro Injectable (ADMELOG) 9 Unit(s) SubCutaneous three times a day before meals  ketotifen 0.025% Ophthalmic Solution 1 Drop(s) Both EYES two times a day  lactobacillus acidophilus 1 Tablet(s) Oral daily  melatonin 6 milliGRAM(s) Oral at bedtime  metoprolol succinate ER 50 milliGRAM(s) Oral daily  mirtazapine 7.5 milliGRAM(s) Oral at bedtime  montelukast 10 milliGRAM(s) Oral daily  mupirocin 2% Ointment 1 Application(s) Topical two times a day  nystatin    Suspension 891270 Unit(s) Oral four times a day  pantoprazole    Tablet 40 milliGRAM(s) Oral before breakfast  polyethylene glycol 3350 17 Gram(s) Oral two times a day  predniSONE   Tablet 40 milliGRAM(s) Oral daily  senna 2 Tablet(s) Oral at bedtime  sodium chloride 3%  Inhalation 4 milliLiter(s) Inhalation every 12 hours  tamsulosin 0.4 milliGRAM(s) Oral at bedtime    MEDICATIONS  (PRN):  acetaminophen     Tablet .. 650 milliGRAM(s) Oral every 6 hours PRN Temp greater or equal to 38C (100.4F), Mild Pain (1 - 3)  ALPRAZolam 0.25 milliGRAM(s) Oral every 8 hours PRN anxiety  aluminum hydroxide/magnesium hydroxide/simethicone Suspension 30 milliLiter(s) Oral every 4 hours PRN Dyspepsia  bisacodyl Suppository 10 milliGRAM(s) Rectal daily PRN Constipation  dextrose Oral Gel 15 Gram(s) Oral once PRN Blood Glucose LESS THAN 70 milliGRAM(s)/deciliter  ondansetron Injectable 4 milliGRAM(s) IV Push every 8 hours PRN Nausea and/or Vomiting      LABS:                        10.4   6.96  )-----------( 158      ( 06 Apr 2023 06:17 )             34.1     04-06    136  |  98  |  22  ----------------------------<  225<H>  4.4   |  30  |  0.34<L>    Ca    9.3      06 Apr 2023 06:17  Phos  3.2     04-06  Mg     1.90     04-06            Venous Blood Gas:  04-06 @ 06:17  7.30/72/65/35/93.1  VBG Lactate: 1.0      MICROBIOLOGY:     RADIOLOGY:  [ ] Reviewed and interpreted by me    Point of Care Ultrasound Findings:    PFT:    EKG:

## 2023-04-07 NOTE — PROGRESS NOTE ADULT - ATTENDING COMMENTS
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 and night time AVAPS w/ frequent episodes of admission due to bronchiectasis, HTN, chronic constipation adm for bronchiectasis exacerbation on cefepime/has completed antibiotics course..  Pending AVAPS approval for dispo home.  Patient is stable for DC   Will complete steroids course   DC planning as above.

## 2023-04-07 NOTE — CHART NOTE - NSCHARTNOTEFT_GEN_A_CORE
Patient has acute on chronic respiratory failure secondary to COPD in need of non-invasive ventilation.  Patient has tried and failed NC at settings of 2L.  Patient's CO2 remains elevated above 50.  Without non-invasive ventilation patient will deteriorate causing possible medical harm or readmission.  Please expedite authorization so my patient can be discharged home safely.    Marielle Rico, PGY-1  Department of Medicine  Available on TEAMS or [phone] Patient has acute on chronic respiratory failure secondary to COPD in need of non-invasive ventilation.  Patient has tried and failed NC at settings of 2L and BiPAP due to need for tidal volume support.  Patient's CO2 remains elevated above 50.  Without non-invasive ventilation patient will deteriorate causing possible medical harm or readmission.  Please expedite authorization so my patient can be discharged home safely.    Marielle Rico, PGY-1  Department of Medicine  Available on TEAMS or [phone]

## 2023-04-07 NOTE — PROGRESS NOTE ADULT - PROBLEM SELECTOR PLAN 4
- Hx of PCD, difficulty clearing mucus per pt  - aggressive pulm toileting, including chest PT, mucomyst, hypersal, chest vest q12h, aerobika q12h , mucinex  - c/w symbicort, singular, standing duonebs   - pulm consulted, follow recs   - CT Chest: New clustered nodules/tree-in-bud opacities in the right upper and left lower lobes suggesting infectious etiology with endobronchial spread.  - prednisone 40mg qd x5 doses to end on 4/8  - s/p diamox for metabolic alklalosis

## 2023-04-07 NOTE — PROGRESS NOTE ADULT - PROBLEM SELECTOR PLAN 2
Patient with COPD 2/2 primary ciliary dyskinesia    - continue symbicort, chest PT, mucomyst, hypersal, chest vest q12h, aerobika q12h , mucinex  - c/w symbicort, singular, standing duonebs   - will require AVAPS on discharge due to CO2 retention  - pulse oximetry overnight

## 2023-04-07 NOTE — PROGRESS NOTE ADULT - ASSESSMENT
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  Pending AVAPS approval for dispo home.

## 2023-04-07 NOTE — PROGRESS NOTE ADULT - PROBLEM SELECTOR PLAN 7
- Home has 7U admelog TID and 8U lantus  - on 9 units premeal TID, ISS, 10 units of lantus qhs due to starting prednisone

## 2023-04-07 NOTE — PROGRESS NOTE ADULT - ASSESSMENT
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 and night time AVAPS w/ frequent episodes of admission due to bronchiectasis, HTN, chronic constipation, presents with bronchiectasis exacerbation    #Bronchiectasis  #Hypoxic Respiratory failure    Recommendations:  - Sputum culture reviewed, growing klebsiella and pseudomonas  - completed 7 day course of cefepime  - Airway clearance with duonebs q6h, mucomyst Aerobika BID, Chest vest BID, h ypersal  - Symbicort 160 BID   - Day 4/5 of prednisone 40mg  - avoid dual anticholinergics   - c/w home Singulair  - supplemental O2, avoid hyperoxemia, goal sat >90%, wean as tolerated   - continue with AVAPS at night, patient reports compliance with NIV. will need on discharge home    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 and night time AVAPS w/ frequent episodes of admission due to bronchiectasis, HTN, chronic constipation, presents with bronchiectasis exacerbation    #Bronchiectasis  #Hypoxic Respiratory failure    Recommendations:  - Sputum culture reviewed, growing klebsiella and pseudomonas  - completed 7 day course of cefepime  - Airway clearance with duonebs q6h, mucomyst Aerobika BID, Chest vest BID, h ypersal  - Add on MWF azithromycin for antiinflammatory properties  - Symbicort 160 BID   - Day 4/5 of prednisone 40mg  - avoid dual anticholinergics   - c/w home Singulair  - supplemental O2, avoid hyperoxemia, goal sat >90%, wean as tolerated   - continue with AVAPS at night, patient reports compliance with NIV. will need on discharge home

## 2023-04-07 NOTE — PROGRESS NOTE ADULT - ATTENDING COMMENTS
68 yo F hx primary ciliary dyskinesia, bronchiectasis, and chronic hypoxemic and hypercapnic respiratory on AVAPS p/w exacerbation of bronchiectasis.     Wheezing remains improved but feels that the SOB remains troublesome and the sputum has not improved.   Given lack of improvement and her significant limitation in ET will do a slow taper of pred. WOuld also consider azithro for decreasing future exacerbations    - cont pred and would slowly taper down giuven residual sx  - cont symbicort bid  - cont airway clearance, duonebs q6, hypersal, chest vest and Aerobika   - pt when using   said the NIV helps with breathing but only used it for 1 hour, reinforced need for proper use    - maintain sat 88-92% while using NC  - completed Cefepime, complete course for PsA in sputum  - consider azithro for decreasing exacerbations (monitor qtc and ototoxicity)  - PT/OOB to chair as tolerated  - dvt ppx . 66 yo F hx primary ciliary dyskinesia, bronchiectasis, and chronic hypoxemic and hypercapnic respiratory on AVAPS p/w exacerbation of bronchiectasis.     Wheezing remains improved but feels that the SOB remains troublesome and the sputum has not improved.   Given lack of improvement and her significant limitation in ET will do a slow taper of pred. WOuld also consider azithro for decreasing future exacerbations    - cont pred and would slowly taper down giuven residual sx  - cont symbicort bid  - cont airway clearance, duonebs q6, hypersal, chest vest and Aerobika   - pt when using   said the NIV helps with breathing but only used it for 1 hour, reinforced need for proper use    - maintain sat 88-92% while using NC  - completed Cefepime, complete course for PsA in sputum  - consider azithro tiw for decreasing exacerbations (monitor qtc and ototoxicity)  - PT/OOB to chair as tolerated  - dvt ppx .

## 2023-04-07 NOTE — PROGRESS NOTE ADULT - SUBJECTIVE AND OBJECTIVE BOX
Patient is a 68y old  Female who presents with a chief complaint of SOB (07 Apr 2023 07:22)      SUBJECTIVE / OVERNIGHT EVENTS:    MEDICATIONS  (STANDING):  acetylcysteine 10%  Inhalation 4 milliLiter(s) Inhalation two times a day  albuterol/ipratropium for Nebulization 3 milliLiter(s) Nebulizer every 6 hours  amLODIPine   Tablet 5 milliGRAM(s) Oral daily  budesonide 160 MICROgram(s)/formoterol 4.5 MICROgram(s) Inhaler 2 Puff(s) Inhalation two times a day  chlorhexidine 2% Cloths 1 Application(s) Topical daily  dextrose 5%. 1000 milliLiter(s) (100 mL/Hr) IV Continuous <Continuous>  dextrose 5%. 1000 milliLiter(s) (50 mL/Hr) IV Continuous <Continuous>  dextrose 50% Injectable 25 Gram(s) IV Push once  dextrose 50% Injectable 12.5 Gram(s) IV Push once  dextrose 50% Injectable 25 Gram(s) IV Push once  enoxaparin Injectable 30 milliGRAM(s) SubCutaneous every 24 hours  fluticasone propionate 50 MICROgram(s)/spray Nasal Spray 1 Spray(s) Both Nostrils two times a day  glucagon  Injectable 1 milliGRAM(s) IntraMuscular once  influenza  Vaccine (HIGH DOSE) 0.7 milliLiter(s) IntraMuscular once  insulin glargine Injectable (LANTUS) 10 Unit(s) SubCutaneous at bedtime  insulin lispro (ADMELOG) corrective regimen sliding scale   SubCutaneous three times a day before meals  insulin lispro (ADMELOG) corrective regimen sliding scale   SubCutaneous at bedtime  insulin lispro Injectable (ADMELOG) 9 Unit(s) SubCutaneous three times a day before meals  ketotifen 0.025% Ophthalmic Solution 1 Drop(s) Both EYES two times a day  lactobacillus acidophilus 1 Tablet(s) Oral daily  melatonin 6 milliGRAM(s) Oral at bedtime  metoprolol succinate ER 50 milliGRAM(s) Oral daily  mirtazapine 7.5 milliGRAM(s) Oral at bedtime  montelukast 10 milliGRAM(s) Oral daily  mupirocin 2% Ointment 1 Application(s) Topical two times a day  nystatin    Suspension 148685 Unit(s) Oral four times a day  pantoprazole    Tablet 40 milliGRAM(s) Oral before breakfast  polyethylene glycol 3350 17 Gram(s) Oral two times a day  predniSONE   Tablet 40 milliGRAM(s) Oral daily  senna 2 Tablet(s) Oral at bedtime  sodium chloride 3%  Inhalation 4 milliLiter(s) Inhalation every 12 hours  tamsulosin 0.4 milliGRAM(s) Oral at bedtime    MEDICATIONS  (PRN):  acetaminophen     Tablet .. 650 milliGRAM(s) Oral every 6 hours PRN Temp greater or equal to 38C (100.4F), Mild Pain (1 - 3)  ALPRAZolam 0.25 milliGRAM(s) Oral every 8 hours PRN anxiety  aluminum hydroxide/magnesium hydroxide/simethicone Suspension 30 milliLiter(s) Oral every 4 hours PRN Dyspepsia  bisacodyl Suppository 10 milliGRAM(s) Rectal daily PRN Constipation  dextrose Oral Gel 15 Gram(s) Oral once PRN Blood Glucose LESS THAN 70 milliGRAM(s)/deciliter  ondansetron Injectable 4 milliGRAM(s) IV Push every 8 hours PRN Nausea and/or Vomiting      CAPILLARY BLOOD GLUCOSE      POCT Blood Glucose.: 155 mg/dL (06 Apr 2023 22:11)  POCT Blood Glucose.: 225 mg/dL (06 Apr 2023 17:10)  POCT Blood Glucose.: 357 mg/dL (06 Apr 2023 11:56)  POCT Blood Glucose.: 269 mg/dL (06 Apr 2023 08:23)    I&O's Summary      Vital Signs Last 24 Hrs  T(C): 36.6 (07 Apr 2023 06:00), Max: 36.7 (06 Apr 2023 12:11)  T(F): 97.9 (07 Apr 2023 06:00), Max: 98 (06 Apr 2023 12:11)  HR: 90 (07 Apr 2023 07:33) (72 - 98)  BP: 143/76 (07 Apr 2023 06:00) (102/51 - 143/77)  BP(mean): --  RR: 18 (07 Apr 2023 06:00) (17 - 18)  SpO2: 98% (07 Apr 2023 07:33) (97% - 100%)    Parameters below as of 07 Apr 2023 07:33  Patient On (Oxygen Delivery Method): nasal cannula        PHYSICAL EXAM:  GENERAL: NAD, well-developed  HEAD: Atraumatic, Normocephalic  EYES: EOMI, PERRLA, conjunctiva and sclera clear  NECK: Supple, No JVD  CHEST/LUNG: Clear to auscultation bilaterally; No wheezes or crackles  HEART: Normal S1/S2; Regular rate and rhythm; No murmurs, rubs, or gallops  ABDOMEN: Soft, Nontender, Nondistended; Bowel sounds present  EXTREMITIES: 2+ Peripheral Pulses; No clubbing, cyanosis, or edema  PSYCH: A&Ox3  NEUROLOGY: no focal neurologic deficit  SKIN: No rashes or lesions    LABS:                        10.4   6.96  )-----------( 158      ( 06 Apr 2023 06:17 )             34.1      04-06    136  |  98  |  22  ----------------------------<  225<H>  4.4   |  30  |  0.34<L>    Ca    9.3      06 Apr 2023 06:17  Phos  3.2     04-06  Mg     1.90     04-06                RADIOLOGY & ADDITIONAL TESTS:    Imaging Personally Reviewed:    Consultant(s) Notes Reviewed:      Care Discussed with Consultants/Other Providers:

## 2023-04-08 LAB
GLUCOSE BLDC GLUCOMTR-MCNC: 123 MG/DL — HIGH (ref 70–99)
GLUCOSE BLDC GLUCOMTR-MCNC: 167 MG/DL — HIGH (ref 70–99)
GLUCOSE BLDC GLUCOMTR-MCNC: 235 MG/DL — HIGH (ref 70–99)
GLUCOSE BLDC GLUCOMTR-MCNC: 296 MG/DL — HIGH (ref 70–99)
GLUCOSE BLDC GLUCOMTR-MCNC: 72 MG/DL — SIGNIFICANT CHANGE UP (ref 70–99)
GLUCOSE BLDC GLUCOMTR-MCNC: 94 MG/DL — SIGNIFICANT CHANGE UP (ref 70–99)

## 2023-04-08 PROCEDURE — 99232 SBSQ HOSP IP/OBS MODERATE 35: CPT | Mod: GC

## 2023-04-08 RX ORDER — ACETYLCYSTEINE 200 MG/ML
2 VIAL (ML) MISCELLANEOUS
Refills: 0 | Status: DISCONTINUED | OUTPATIENT
Start: 2023-04-08 | End: 2023-04-11

## 2023-04-08 RX ADMIN — Medication 9 UNIT(S): at 17:51

## 2023-04-08 RX ADMIN — SODIUM CHLORIDE 4 MILLILITER(S): 9 INJECTION INTRAMUSCULAR; INTRAVENOUS; SUBCUTANEOUS at 10:38

## 2023-04-08 RX ADMIN — SENNA PLUS 2 TABLET(S): 8.6 TABLET ORAL at 22:55

## 2023-04-08 RX ADMIN — Medication 1 SPRAY(S): at 05:19

## 2023-04-08 RX ADMIN — Medication 500000 UNIT(S): at 22:56

## 2023-04-08 RX ADMIN — POLYETHYLENE GLYCOL 3350 17 GRAM(S): 17 POWDER, FOR SOLUTION ORAL at 17:50

## 2023-04-08 RX ADMIN — KETOTIFEN FUMARATE 1 DROP(S): 0.34 SOLUTION OPHTHALMIC at 22:56

## 2023-04-08 RX ADMIN — POLYETHYLENE GLYCOL 3350 17 GRAM(S): 17 POWDER, FOR SOLUTION ORAL at 05:22

## 2023-04-08 RX ADMIN — MUPIROCIN 1 APPLICATION(S): 20 OINTMENT TOPICAL at 05:23

## 2023-04-08 RX ADMIN — TAMSULOSIN HYDROCHLORIDE 0.4 MILLIGRAM(S): 0.4 CAPSULE ORAL at 22:55

## 2023-04-08 RX ADMIN — Medication 6 MILLIGRAM(S): at 22:55

## 2023-04-08 RX ADMIN — Medication 500000 UNIT(S): at 17:50

## 2023-04-08 RX ADMIN — Medication 30 MILLILITER(S): at 18:01

## 2023-04-08 RX ADMIN — Medication 50 MILLIGRAM(S): at 05:21

## 2023-04-08 RX ADMIN — Medication 2: at 12:43

## 2023-04-08 RX ADMIN — ENOXAPARIN SODIUM 30 MILLIGRAM(S): 100 INJECTION SUBCUTANEOUS at 22:56

## 2023-04-08 RX ADMIN — MUPIROCIN 1 APPLICATION(S): 20 OINTMENT TOPICAL at 17:51

## 2023-04-08 RX ADMIN — INSULIN GLARGINE 10 UNIT(S): 100 INJECTION, SOLUTION SUBCUTANEOUS at 22:56

## 2023-04-08 RX ADMIN — MIRTAZAPINE 7.5 MILLIGRAM(S): 45 TABLET, ORALLY DISINTEGRATING ORAL at 22:56

## 2023-04-08 RX ADMIN — SODIUM CHLORIDE 4 MILLILITER(S): 9 INJECTION INTRAMUSCULAR; INTRAVENOUS; SUBCUTANEOUS at 21:42

## 2023-04-08 RX ADMIN — Medication 9 UNIT(S): at 09:10

## 2023-04-08 RX ADMIN — Medication 3 MILLILITER(S): at 21:42

## 2023-04-08 RX ADMIN — Medication 40 MILLIGRAM(S): at 05:21

## 2023-04-08 RX ADMIN — Medication 3 MILLILITER(S): at 15:50

## 2023-04-08 RX ADMIN — AMLODIPINE BESYLATE 5 MILLIGRAM(S): 2.5 TABLET ORAL at 05:22

## 2023-04-08 RX ADMIN — Medication 3 MILLILITER(S): at 10:38

## 2023-04-08 RX ADMIN — Medication 1: at 17:51

## 2023-04-08 RX ADMIN — BUDESONIDE AND FORMOTEROL FUMARATE DIHYDRATE 2 PUFF(S): 160; 4.5 AEROSOL RESPIRATORY (INHALATION) at 22:57

## 2023-04-08 RX ADMIN — Medication 3: at 09:10

## 2023-04-08 RX ADMIN — Medication 500000 UNIT(S): at 00:24

## 2023-04-08 RX ADMIN — CHLORHEXIDINE GLUCONATE 1 APPLICATION(S): 213 SOLUTION TOPICAL at 23:07

## 2023-04-08 RX ADMIN — KETOTIFEN FUMARATE 1 DROP(S): 0.34 SOLUTION OPHTHALMIC at 05:20

## 2023-04-08 RX ADMIN — PANTOPRAZOLE SODIUM 40 MILLIGRAM(S): 20 TABLET, DELAYED RELEASE ORAL at 05:21

## 2023-04-08 RX ADMIN — Medication 4 MILLILITER(S): at 21:42

## 2023-04-08 RX ADMIN — Medication 500000 UNIT(S): at 12:43

## 2023-04-08 RX ADMIN — Medication 1 SPRAY(S): at 17:50

## 2023-04-08 RX ADMIN — Medication 3 MILLILITER(S): at 04:37

## 2023-04-08 RX ADMIN — BUDESONIDE AND FORMOTEROL FUMARATE DIHYDRATE 2 PUFF(S): 160; 4.5 AEROSOL RESPIRATORY (INHALATION) at 09:11

## 2023-04-08 RX ADMIN — Medication 9 UNIT(S): at 12:43

## 2023-04-08 RX ADMIN — Medication 1 TABLET(S): at 12:45

## 2023-04-08 RX ADMIN — Medication 500000 UNIT(S): at 05:22

## 2023-04-08 RX ADMIN — MONTELUKAST 10 MILLIGRAM(S): 4 TABLET, CHEWABLE ORAL at 12:45

## 2023-04-08 NOTE — PROVIDER CONTACT NOTE (HYPOGLYCEMIA EVENT) - NS PROVIDER CONTACT BACKGROUND-HYPO
Age: 68y    Gender: Female    POCT Blood Glucose:  123 mg/dL (04-08-23 @ 22:22)  94 mg/dL (04-08-23 @ 22:22)  72 mg/dL (04-08-23 @ 21:50)  167 mg/dL (04-08-23 @ 17:13)  235 mg/dL (04-08-23 @ 12:12)  296 mg/dL (04-08-23 @ 08:59)      eMAR:  insulin glargine Injectable (LANTUS)   10 Unit(s) SubCutaneous (04-08-23 @ 22:56)    insulin lispro (ADMELOG) corrective regimen sliding scale   1 Unit(s) SubCutaneous (04-08-23 @ 17:51)   2 Unit(s) SubCutaneous (04-08-23 @ 12:43)   3 Unit(s) SubCutaneous (04-08-23 @ 09:10)    insulin lispro Injectable (ADMELOG)   9 Unit(s) SubCutaneous (04-08-23 @ 17:51)   9 Unit(s) SubCutaneous (04-08-23 @ 12:43)   9 Unit(s) SubCutaneous (04-08-23 @ 09:10)    predniSONE   Tablet   40 milliGRAM(s) Oral (04-08-23 @ 05:21)

## 2023-04-08 NOTE — PROGRESS NOTE ADULT - PROBLEM SELECTOR PLAN 4
- Hx of PCD, difficulty clearing mucus per pt  - aggressive pulm toileting, including chest PT, mucomyst, hypersal, chest vest q12h, aerobika q12h , mucinex  - c/w symbicort, singular, standing duonebs   - pulm consulted, follow recs   - CT Chest: New clustered nodules/tree-in-bud opacities in the right upper and left lower lobes suggesting infectious etiology with endobronchial spread.  - prednisone 40mg qd x5 doses to end on 4/8  - s/p diamox for metabolic alklalosis - Home has 7U admelog TID and 8U lantus  - on 9 units premeal TID, ISS, 10 units of lantus qhs due to starting prednisone

## 2023-04-08 NOTE — PROGRESS NOTE ADULT - PROBLEM SELECTOR PLAN 5
- pt constipated per HPI and CT scan  - no bowel obstruction appreciated on exam, abd soft non-tender  - senna, miralax BID, dulcolax - Hold lasix iso hypo-Na  - c/w amlodipine 5 w/ holding parameters  - c/w metoprolol succinate 50 mg qd home dose

## 2023-04-08 NOTE — PROGRESS NOTE ADULT - SUBJECTIVE AND OBJECTIVE BOX
Patient is a 68y old  Female who presents with a chief complaint of SOB (07 Apr 2023 07:39)      SUBJECTIVE / OVERNIGHT EVENTS:    MEDICATIONS  (STANDING):  acetylcysteine 10%  Inhalation 4 milliLiter(s) Inhalation two times a day  albuterol/ipratropium for Nebulization 3 milliLiter(s) Nebulizer every 6 hours  amLODIPine   Tablet 5 milliGRAM(s) Oral daily  azithromycin   Tablet 500 milliGRAM(s) Oral <User Schedule>  budesonide 160 MICROgram(s)/formoterol 4.5 MICROgram(s) Inhaler 2 Puff(s) Inhalation two times a day  chlorhexidine 2% Cloths 1 Application(s) Topical daily  dextrose 5%. 1000 milliLiter(s) (50 mL/Hr) IV Continuous <Continuous>  dextrose 5%. 1000 milliLiter(s) (100 mL/Hr) IV Continuous <Continuous>  dextrose 50% Injectable 25 Gram(s) IV Push once  dextrose 50% Injectable 12.5 Gram(s) IV Push once  dextrose 50% Injectable 25 Gram(s) IV Push once  enoxaparin Injectable 30 milliGRAM(s) SubCutaneous every 24 hours  fluticasone propionate 50 MICROgram(s)/spray Nasal Spray 1 Spray(s) Both Nostrils two times a day  glucagon  Injectable 1 milliGRAM(s) IntraMuscular once  influenza  Vaccine (HIGH DOSE) 0.7 milliLiter(s) IntraMuscular once  insulin glargine Injectable (LANTUS) 10 Unit(s) SubCutaneous at bedtime  insulin lispro (ADMELOG) corrective regimen sliding scale   SubCutaneous three times a day before meals  insulin lispro (ADMELOG) corrective regimen sliding scale   SubCutaneous at bedtime  insulin lispro Injectable (ADMELOG) 9 Unit(s) SubCutaneous three times a day before meals  ketotifen 0.025% Ophthalmic Solution 1 Drop(s) Both EYES two times a day  lactobacillus acidophilus 1 Tablet(s) Oral daily  melatonin 6 milliGRAM(s) Oral at bedtime  metoprolol succinate ER 50 milliGRAM(s) Oral daily  mirtazapine 7.5 milliGRAM(s) Oral at bedtime  montelukast 10 milliGRAM(s) Oral daily  mupirocin 2% Ointment 1 Application(s) Topical two times a day  nystatin    Suspension 992340 Unit(s) Oral four times a day  pantoprazole    Tablet 40 milliGRAM(s) Oral before breakfast  polyethylene glycol 3350 17 Gram(s) Oral two times a day  senna 2 Tablet(s) Oral at bedtime  sodium chloride 3%  Inhalation 4 milliLiter(s) Inhalation every 12 hours  tamsulosin 0.4 milliGRAM(s) Oral at bedtime    MEDICATIONS  (PRN):  acetaminophen     Tablet .. 650 milliGRAM(s) Oral every 6 hours PRN Temp greater or equal to 38C (100.4F), Mild Pain (1 - 3)  ALPRAZolam 0.25 milliGRAM(s) Oral every 8 hours PRN anxiety  aluminum hydroxide/magnesium hydroxide/simethicone Suspension 30 milliLiter(s) Oral every 4 hours PRN Dyspepsia  bisacodyl Suppository 10 milliGRAM(s) Rectal daily PRN Constipation  dextrose Oral Gel 15 Gram(s) Oral once PRN Blood Glucose LESS THAN 70 milliGRAM(s)/deciliter  ondansetron Injectable 4 milliGRAM(s) IV Push every 8 hours PRN Nausea and/or Vomiting      CAPILLARY BLOOD GLUCOSE      POCT Blood Glucose.: 165 mg/dL (07 Apr 2023 22:10)  POCT Blood Glucose.: 176 mg/dL (07 Apr 2023 17:07)  POCT Blood Glucose.: 168 mg/dL (07 Apr 2023 12:13)  POCT Blood Glucose.: 233 mg/dL (07 Apr 2023 08:34)    I&O's Summary    07 Apr 2023 07:01  -  08 Apr 2023 07:00  --------------------------------------------------------  IN: 0 mL / OUT: 100 mL / NET: -100 mL        Vital Signs Last 24 Hrs  T(C): 36.4 (08 Apr 2023 05:25), Max: 36.8 (07 Apr 2023 22:15)  T(F): 97.5 (08 Apr 2023 05:25), Max: 98.2 (07 Apr 2023 22:15)  HR: 82 (08 Apr 2023 05:25) (82 - 94)  BP: 104/58 (08 Apr 2023 05:25) (104/58 - 126/70)  BP(mean): --  RR: 18 (08 Apr 2023 05:25) (18 - 18)  SpO2: 100% (08 Apr 2023 05:25) (96% - 100%)    Parameters below as of 08 Apr 2023 05:25  Patient On (Oxygen Delivery Method): nasal cannula  O2 Flow (L/min): 2      PHYSICAL EXAM:  GENERAL: NAD, well-developed  HEAD: Atraumatic, Normocephalic  EYES: EOMI, PERRLA, conjunctiva and sclera clear  NECK: Supple, No JVD  CHEST/LUNG: Clear to auscultation bilaterally; No wheezes or crackles  HEART: Normal S1/S2; Regular rate and rhythm; No murmurs, rubs, or gallops  ABDOMEN: Soft, Nontender, Nondistended; Bowel sounds present  EXTREMITIES: 2+ Peripheral Pulses; No clubbing, cyanosis, or edema  PSYCH: A&Ox3  NEUROLOGY: no focal neurologic deficit  SKIN: No rashes or lesions    LABS:                     RADIOLOGY & ADDITIONAL TESTS:    Imaging Personally Reviewed:    Consultant(s) Notes Reviewed:      Care Discussed with Consultants/Other Providers:   Patient is a 68y old  Female who presents with a chief complaint of SOB (07 Apr 2023 07:39)      SUBJECTIVE / OVERNIGHT EVENTS:    No o/n events.  Patient sleeping peacefully this am.    MEDICATIONS  (STANDING):  acetylcysteine 10%  Inhalation 4 milliLiter(s) Inhalation two times a day  albuterol/ipratropium for Nebulization 3 milliLiter(s) Nebulizer every 6 hours  amLODIPine   Tablet 5 milliGRAM(s) Oral daily  azithromycin   Tablet 500 milliGRAM(s) Oral <User Schedule>  budesonide 160 MICROgram(s)/formoterol 4.5 MICROgram(s) Inhaler 2 Puff(s) Inhalation two times a day  chlorhexidine 2% Cloths 1 Application(s) Topical daily  dextrose 5%. 1000 milliLiter(s) (50 mL/Hr) IV Continuous <Continuous>  dextrose 5%. 1000 milliLiter(s) (100 mL/Hr) IV Continuous <Continuous>  dextrose 50% Injectable 25 Gram(s) IV Push once  dextrose 50% Injectable 12.5 Gram(s) IV Push once  dextrose 50% Injectable 25 Gram(s) IV Push once  enoxaparin Injectable 30 milliGRAM(s) SubCutaneous every 24 hours  fluticasone propionate 50 MICROgram(s)/spray Nasal Spray 1 Spray(s) Both Nostrils two times a day  glucagon  Injectable 1 milliGRAM(s) IntraMuscular once  influenza  Vaccine (HIGH DOSE) 0.7 milliLiter(s) IntraMuscular once  insulin glargine Injectable (LANTUS) 10 Unit(s) SubCutaneous at bedtime  insulin lispro (ADMELOG) corrective regimen sliding scale   SubCutaneous three times a day before meals  insulin lispro (ADMELOG) corrective regimen sliding scale   SubCutaneous at bedtime  insulin lispro Injectable (ADMELOG) 9 Unit(s) SubCutaneous three times a day before meals  ketotifen 0.025% Ophthalmic Solution 1 Drop(s) Both EYES two times a day  lactobacillus acidophilus 1 Tablet(s) Oral daily  melatonin 6 milliGRAM(s) Oral at bedtime  metoprolol succinate ER 50 milliGRAM(s) Oral daily  mirtazapine 7.5 milliGRAM(s) Oral at bedtime  montelukast 10 milliGRAM(s) Oral daily  mupirocin 2% Ointment 1 Application(s) Topical two times a day  nystatin    Suspension 036427 Unit(s) Oral four times a day  pantoprazole    Tablet 40 milliGRAM(s) Oral before breakfast  polyethylene glycol 3350 17 Gram(s) Oral two times a day  senna 2 Tablet(s) Oral at bedtime  sodium chloride 3%  Inhalation 4 milliLiter(s) Inhalation every 12 hours  tamsulosin 0.4 milliGRAM(s) Oral at bedtime    MEDICATIONS  (PRN):  acetaminophen     Tablet .. 650 milliGRAM(s) Oral every 6 hours PRN Temp greater or equal to 38C (100.4F), Mild Pain (1 - 3)  ALPRAZolam 0.25 milliGRAM(s) Oral every 8 hours PRN anxiety  aluminum hydroxide/magnesium hydroxide/simethicone Suspension 30 milliLiter(s) Oral every 4 hours PRN Dyspepsia  bisacodyl Suppository 10 milliGRAM(s) Rectal daily PRN Constipation  dextrose Oral Gel 15 Gram(s) Oral once PRN Blood Glucose LESS THAN 70 milliGRAM(s)/deciliter  ondansetron Injectable 4 milliGRAM(s) IV Push every 8 hours PRN Nausea and/or Vomiting      CAPILLARY BLOOD GLUCOSE      POCT Blood Glucose.: 165 mg/dL (07 Apr 2023 22:10)  POCT Blood Glucose.: 176 mg/dL (07 Apr 2023 17:07)  POCT Blood Glucose.: 168 mg/dL (07 Apr 2023 12:13)  POCT Blood Glucose.: 233 mg/dL (07 Apr 2023 08:34)    I&O's Summary    07 Apr 2023 07:01  -  08 Apr 2023 07:00  --------------------------------------------------------  IN: 0 mL / OUT: 100 mL / NET: -100 mL        Vital Signs Last 24 Hrs  T(C): 36.4 (08 Apr 2023 05:25), Max: 36.8 (07 Apr 2023 22:15)  T(F): 97.5 (08 Apr 2023 05:25), Max: 98.2 (07 Apr 2023 22:15)  HR: 82 (08 Apr 2023 05:25) (82 - 94)  BP: 104/58 (08 Apr 2023 05:25) (104/58 - 126/70)  BP(mean): --  RR: 18 (08 Apr 2023 05:25) (18 - 18)  SpO2: 100% (08 Apr 2023 05:25) (96% - 100%)    Parameters below as of 08 Apr 2023 05:25  Patient On (Oxygen Delivery Method): nasal cannula  O2 Flow (L/min): 2      PHYSICAL EXAM:  GENERAL: NAD, lying in bed comfortable, appears older than chronological age, Nasal cannula in place with no respiratory distress.   HEAD:  Atraumatic, normocephalic  EYES: EOMI, PERRLA, conjunctiva and sclera clear  ENT: dry mucous membranes, white film appreciated on tongue  NECK: Supple, no JVD  HEART: Tachycardic rate, regular rhythm, no murmurs, rubs, or gallops  LUNGS: b/l wheezes heard  ABDOMEN: Soft, nontender, nondistended, +BS  EXTREMITIES: 2+ peripheral pulses bilaterally. No clubbing, cyanosis, or edema  NERVOUS SYSTEM:  A&Ox3, no focal deficits   SKIN: +skin tenting, No rashes or lesions    LABS:                     RADIOLOGY & ADDITIONAL TESTS:    Imaging Personally Reviewed:    Consultant(s) Notes Reviewed:      Care Discussed with Consultants/Other Providers:

## 2023-04-08 NOTE — PROGRESS NOTE ADULT - ATTENDING COMMENTS
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  Pending AVAPS approval for dispo home.  AVAPS approval pending , patient is stable for DC

## 2023-04-08 NOTE — PROGRESS NOTE ADULT - PROBLEM SELECTOR PLAN 3
Likely leading to pt's septic presentation    - s/p cefepime x7d  - trend WBC, fever curve    RESOLVED - pt constipated per HPI and CT scan  - no bowel obstruction appreciated on exam, abd soft non-tender  - senna, miralax BID, dulcolax

## 2023-04-08 NOTE — PROGRESS NOTE ADULT - PROBLEM SELECTOR PLAN 6
- Na 117 at admission, (123 w/ correction)  baseline 135 outpatient, 132; now 130   - ddx hypovolemic 2/2 poor PO intake vs SIADH  - hyponatremia workup consistent w/ SIADH however patient appears volume down - DVT: lovenox  - diet: soft and bite sized   - dispo: Home PT   - Communication: 4/7 spoke to  and son

## 2023-04-08 NOTE — PROGRESS NOTE ADULT - PROBLEM SELECTOR PLAN 1
RESOLVED    - pt met sepsis criteria due to elevated WBC, increased WOB (RR), and elevated lactate  - s/p ceftriaxone in the ED; s/p cefepime x7d ; dc azithromycin (U legionella neg)  - outpt record showed colonization of sputum w/ Pseudomonas and Klebsiella  - Bcx NGTD, Scx NGTD Patient with COPD 2/2 primary ciliary dyskinesia    - continue symbicort, chest PT, mucomyst, hypersal, chest vest q12h, aerobika q12h , mucinex  - c/w symbicort, singular, standing duonebs   - will require AVAPS on discharge due to CO2 retention  - pulse oximetry overnight

## 2023-04-08 NOTE — PROGRESS NOTE ADULT - PROBLEM SELECTOR PLAN 9
- DVT: lovenox  - diet: soft and bite sized   - dispo: Home PT   - Communication: 4/3 spoke with daughter

## 2023-04-09 LAB
ANION GAP SERPL CALC-SCNC: 6 MMOL/L — LOW (ref 7–14)
BLOOD GAS VENOUS COMPREHENSIVE RESULT: SIGNIFICANT CHANGE UP
BUN SERPL-MCNC: 16 MG/DL — SIGNIFICANT CHANGE UP (ref 7–23)
CALCIUM SERPL-MCNC: 9.2 MG/DL — SIGNIFICANT CHANGE UP (ref 8.4–10.5)
CHLORIDE SERPL-SCNC: 98 MMOL/L — SIGNIFICANT CHANGE UP (ref 98–107)
CO2 SERPL-SCNC: 35 MMOL/L — HIGH (ref 22–31)
CREAT SERPL-MCNC: 0.32 MG/DL — LOW (ref 0.5–1.3)
EGFR: 115 ML/MIN/1.73M2 — SIGNIFICANT CHANGE UP
GLUCOSE BLDC GLUCOMTR-MCNC: 129 MG/DL — HIGH (ref 70–99)
GLUCOSE BLDC GLUCOMTR-MCNC: 192 MG/DL — HIGH (ref 70–99)
GLUCOSE BLDC GLUCOMTR-MCNC: 236 MG/DL — HIGH (ref 70–99)
GLUCOSE BLDC GLUCOMTR-MCNC: 279 MG/DL — HIGH (ref 70–99)
GLUCOSE BLDC GLUCOMTR-MCNC: 40 MG/DL — CRITICAL LOW (ref 70–99)
GLUCOSE BLDC GLUCOMTR-MCNC: 44 MG/DL — CRITICAL LOW (ref 70–99)
GLUCOSE SERPL-MCNC: 176 MG/DL — HIGH (ref 70–99)
HCT VFR BLD CALC: 33.8 % — LOW (ref 34.5–45)
HGB BLD-MCNC: 10.2 G/DL — LOW (ref 11.5–15.5)
MAGNESIUM SERPL-MCNC: 2 MG/DL — SIGNIFICANT CHANGE UP (ref 1.6–2.6)
MCHC RBC-ENTMCNC: 26.5 PG — LOW (ref 27–34)
MCHC RBC-ENTMCNC: 30.2 GM/DL — LOW (ref 32–36)
MCV RBC AUTO: 87.8 FL — SIGNIFICANT CHANGE UP (ref 80–100)
NRBC # BLD: 0 /100 WBCS — SIGNIFICANT CHANGE UP (ref 0–0)
NRBC # FLD: 0 K/UL — SIGNIFICANT CHANGE UP (ref 0–0)
PHOSPHATE SERPL-MCNC: 3.4 MG/DL — SIGNIFICANT CHANGE UP (ref 2.5–4.5)
PLATELET # BLD AUTO: 173 K/UL — SIGNIFICANT CHANGE UP (ref 150–400)
POTASSIUM SERPL-MCNC: 4.4 MMOL/L — SIGNIFICANT CHANGE UP (ref 3.5–5.3)
POTASSIUM SERPL-SCNC: 4.4 MMOL/L — SIGNIFICANT CHANGE UP (ref 3.5–5.3)
RBC # BLD: 3.85 M/UL — SIGNIFICANT CHANGE UP (ref 3.8–5.2)
RBC # FLD: 14 % — SIGNIFICANT CHANGE UP (ref 10.3–14.5)
SODIUM SERPL-SCNC: 139 MMOL/L — SIGNIFICANT CHANGE UP (ref 135–145)
WBC # BLD: 7.81 K/UL — SIGNIFICANT CHANGE UP (ref 3.8–10.5)
WBC # FLD AUTO: 7.81 K/UL — SIGNIFICANT CHANGE UP (ref 3.8–10.5)

## 2023-04-09 PROCEDURE — 99232 SBSQ HOSP IP/OBS MODERATE 35: CPT | Mod: GC

## 2023-04-09 RX ORDER — INSULIN GLARGINE 100 [IU]/ML
8 INJECTION, SOLUTION SUBCUTANEOUS AT BEDTIME
Refills: 0 | Status: DISCONTINUED | OUTPATIENT
Start: 2023-04-09 | End: 2023-04-10

## 2023-04-09 RX ORDER — FAMOTIDINE 10 MG/ML
20 INJECTION INTRAVENOUS ONCE
Refills: 0 | Status: COMPLETED | OUTPATIENT
Start: 2023-04-09 | End: 2023-04-09

## 2023-04-09 RX ADMIN — MUPIROCIN 1 APPLICATION(S): 20 OINTMENT TOPICAL at 05:24

## 2023-04-09 RX ADMIN — Medication 3 MILLILITER(S): at 08:28

## 2023-04-09 RX ADMIN — Medication 30 MILLILITER(S): at 09:00

## 2023-04-09 RX ADMIN — Medication 1 SPRAY(S): at 05:23

## 2023-04-09 RX ADMIN — KETOTIFEN FUMARATE 1 DROP(S): 0.34 SOLUTION OPHTHALMIC at 05:24

## 2023-04-09 RX ADMIN — Medication 500000 UNIT(S): at 21:28

## 2023-04-09 RX ADMIN — Medication 3 MILLILITER(S): at 21:16

## 2023-04-09 RX ADMIN — SODIUM CHLORIDE 4 MILLILITER(S): 9 INJECTION INTRAMUSCULAR; INTRAVENOUS; SUBCUTANEOUS at 21:16

## 2023-04-09 RX ADMIN — Medication 30 MILLILITER(S): at 21:47

## 2023-04-09 RX ADMIN — KETOTIFEN FUMARATE 1 DROP(S): 0.34 SOLUTION OPHTHALMIC at 22:37

## 2023-04-09 RX ADMIN — Medication 5 MILLIGRAM(S): at 09:55

## 2023-04-09 RX ADMIN — Medication 2 MILLILITER(S): at 08:31

## 2023-04-09 RX ADMIN — PANTOPRAZOLE SODIUM 40 MILLIGRAM(S): 20 TABLET, DELAYED RELEASE ORAL at 05:22

## 2023-04-09 RX ADMIN — Medication 2: at 08:54

## 2023-04-09 RX ADMIN — Medication 1: at 22:56

## 2023-04-09 RX ADMIN — POLYETHYLENE GLYCOL 3350 17 GRAM(S): 17 POWDER, FOR SOLUTION ORAL at 05:23

## 2023-04-09 RX ADMIN — Medication 1: at 17:44

## 2023-04-09 RX ADMIN — Medication 50 MILLIGRAM(S): at 05:22

## 2023-04-09 RX ADMIN — Medication 2 MILLILITER(S): at 21:17

## 2023-04-09 RX ADMIN — MONTELUKAST 10 MILLIGRAM(S): 4 TABLET, CHEWABLE ORAL at 14:30

## 2023-04-09 RX ADMIN — CHLORHEXIDINE GLUCONATE 1 APPLICATION(S): 213 SOLUTION TOPICAL at 21:26

## 2023-04-09 RX ADMIN — BUDESONIDE AND FORMOTEROL FUMARATE DIHYDRATE 2 PUFF(S): 160; 4.5 AEROSOL RESPIRATORY (INHALATION) at 08:55

## 2023-04-09 RX ADMIN — Medication 650 MILLIGRAM(S): at 05:22

## 2023-04-09 RX ADMIN — Medication 10 MILLIGRAM(S): at 16:48

## 2023-04-09 RX ADMIN — Medication 1 TABLET(S): at 14:30

## 2023-04-09 RX ADMIN — MIRTAZAPINE 7.5 MILLIGRAM(S): 45 TABLET, ORALLY DISINTEGRATING ORAL at 21:24

## 2023-04-09 RX ADMIN — SODIUM CHLORIDE 4 MILLILITER(S): 9 INJECTION INTRAMUSCULAR; INTRAVENOUS; SUBCUTANEOUS at 08:29

## 2023-04-09 RX ADMIN — AMLODIPINE BESYLATE 5 MILLIGRAM(S): 2.5 TABLET ORAL at 05:22

## 2023-04-09 RX ADMIN — AZITHROMYCIN 500 MILLIGRAM(S): 500 TABLET, FILM COATED ORAL at 17:45

## 2023-04-09 RX ADMIN — ENOXAPARIN SODIUM 30 MILLIGRAM(S): 100 INJECTION SUBCUTANEOUS at 21:23

## 2023-04-09 RX ADMIN — BUDESONIDE AND FORMOTEROL FUMARATE DIHYDRATE 2 PUFF(S): 160; 4.5 AEROSOL RESPIRATORY (INHALATION) at 21:22

## 2023-04-09 RX ADMIN — Medication 3 MILLILITER(S): at 14:56

## 2023-04-09 RX ADMIN — Medication 3 MILLILITER(S): at 03:36

## 2023-04-09 RX ADMIN — MUPIROCIN 1 APPLICATION(S): 20 OINTMENT TOPICAL at 17:46

## 2023-04-09 RX ADMIN — Medication 500000 UNIT(S): at 17:46

## 2023-04-09 RX ADMIN — Medication 500000 UNIT(S): at 14:30

## 2023-04-09 RX ADMIN — Medication 500000 UNIT(S): at 05:23

## 2023-04-09 RX ADMIN — Medication 6 MILLIGRAM(S): at 21:25

## 2023-04-09 RX ADMIN — TAMSULOSIN HYDROCHLORIDE 0.4 MILLIGRAM(S): 0.4 CAPSULE ORAL at 21:24

## 2023-04-09 RX ADMIN — Medication 9 UNIT(S): at 08:55

## 2023-04-09 RX ADMIN — Medication 1 SPRAY(S): at 17:46

## 2023-04-09 RX ADMIN — INSULIN GLARGINE 8 UNIT(S): 100 INJECTION, SOLUTION SUBCUTANEOUS at 22:53

## 2023-04-09 RX ADMIN — FAMOTIDINE 104 MILLIGRAM(S): 10 INJECTION INTRAVENOUS at 16:46

## 2023-04-09 NOTE — PROGRESS NOTE ADULT - SUBJECTIVE AND OBJECTIVE BOX
Peyman Sim, PGY2    DATE OF SERVICE: 04-09-23 @ 07:06    Patient is a 68y old  Female who presents with a chief complaint of SOB (08 Apr 2023 07:34)      SUBJECTIVE / OVERNIGHT EVENTS: Patient with glucose 73 overnight, given cookies. [employer] order changed to match formulary order. AVAPS order was discontinued 4/7 per chart.     MEDICATIONS  (STANDING):  acetylcysteine 20%  Inhalation 2 milliLiter(s) Inhalation two times a day  albuterol/ipratropium for Nebulization 3 milliLiter(s) Nebulizer every 6 hours  amLODIPine   Tablet 5 milliGRAM(s) Oral daily  azithromycin   Tablet 500 milliGRAM(s) Oral <User Schedule>  budesonide 160 MICROgram(s)/formoterol 4.5 MICROgram(s) Inhaler 2 Puff(s) Inhalation two times a day  chlorhexidine 2% Cloths 1 Application(s) Topical daily  dextrose 5%. 1000 milliLiter(s) (100 mL/Hr) IV Continuous <Continuous>  dextrose 5%. 1000 milliLiter(s) (50 mL/Hr) IV Continuous <Continuous>  dextrose 50% Injectable 25 Gram(s) IV Push once  dextrose 50% Injectable 12.5 Gram(s) IV Push once  dextrose 50% Injectable 25 Gram(s) IV Push once  enoxaparin Injectable 30 milliGRAM(s) SubCutaneous every 24 hours  fluticasone propionate 50 MICROgram(s)/spray Nasal Spray 1 Spray(s) Both Nostrils two times a day  glucagon  Injectable 1 milliGRAM(s) IntraMuscular once  influenza  Vaccine (HIGH DOSE) 0.7 milliLiter(s) IntraMuscular once  insulin glargine Injectable (LANTUS) 10 Unit(s) SubCutaneous at bedtime  insulin lispro (ADMELOG) corrective regimen sliding scale   SubCutaneous three times a day before meals  insulin lispro (ADMELOG) corrective regimen sliding scale   SubCutaneous at bedtime  insulin lispro Injectable (ADMELOG) 9 Unit(s) SubCutaneous three times a day before meals  ketotifen 0.025% Ophthalmic Solution 1 Drop(s) Both EYES two times a day  lactobacillus acidophilus 1 Tablet(s) Oral daily  melatonin 6 milliGRAM(s) Oral at bedtime  metoprolol succinate ER 50 milliGRAM(s) Oral daily  mirtazapine 7.5 milliGRAM(s) Oral at bedtime  montelukast 10 milliGRAM(s) Oral daily  mupirocin 2% Ointment 1 Application(s) Topical two times a day  nystatin    Suspension 302700 Unit(s) Oral four times a day  pantoprazole    Tablet 40 milliGRAM(s) Oral before breakfast  polyethylene glycol 3350 17 Gram(s) Oral two times a day  senna 2 Tablet(s) Oral at bedtime  sodium chloride 3%  Inhalation 4 milliLiter(s) Inhalation every 12 hours  tamsulosin 0.4 milliGRAM(s) Oral at bedtime    MEDICATIONS  (PRN):  acetaminophen     Tablet .. 650 milliGRAM(s) Oral every 6 hours PRN Temp greater or equal to 38C (100.4F), Mild Pain (1 - 3)  ALPRAZolam 0.25 milliGRAM(s) Oral every 8 hours PRN anxiety  aluminum hydroxide/magnesium hydroxide/simethicone Suspension 30 milliLiter(s) Oral every 4 hours PRN Dyspepsia  bisacodyl Suppository 10 milliGRAM(s) Rectal daily PRN Constipation  dextrose Oral Gel 15 Gram(s) Oral once PRN Blood Glucose LESS THAN 70 milliGRAM(s)/deciliter  ondansetron Injectable 4 milliGRAM(s) IV Push every 8 hours PRN Nausea and/or Vomiting      Vital Signs Last 24 Hrs  T(C): 36.4 (09 Apr 2023 05:22), Max: 36.7 (08 Apr 2023 14:22)  T(F): 97.6 (09 Apr 2023 05:22), Max: 98.1 (08 Apr 2023 14:22)  HR: 88 (09 Apr 2023 05:22) (78 - 98)  BP: 121/68 (09 Apr 2023 05:22) (107/55 - 127/69)  BP(mean): --  RR: 18 (09 Apr 2023 05:22) (18 - 19)  SpO2: 97% (09 Apr 2023 05:22) (97% - 100%)    Parameters below as of 09 Apr 2023 05:22  Patient On (Oxygen Delivery Method): nasal cannula  O2 Flow (L/min): 2    CAPILLARY BLOOD GLUCOSE      POCT Blood Glucose.: 123 mg/dL (08 Apr 2023 22:22)  POCT Blood Glucose.: 94 mg/dL (08 Apr 2023 22:22)  POCT Blood Glucose.: 72 mg/dL (08 Apr 2023 21:50)  POCT Blood Glucose.: 167 mg/dL (08 Apr 2023 17:13)  POCT Blood Glucose.: 235 mg/dL (08 Apr 2023 12:12)  POCT Blood Glucose.: 296 mg/dL (08 Apr 2023 08:59)    I&O's Summary      PHYSICAL EXAM:  GENERAL: NAD, lying in bed comfortable, appears older than chronological age, Nasal cannula in place with no respiratory distress.   HEAD:  Atraumatic, normocephalic  EYES: EOMI, PERRLA, conjunctiva and sclera clear  ENT: dry mucous membranes, white film appreciated on tongue  NECK: Supple, no JVD  HEART: Tachycardic rate, regular rhythm, no murmurs, rubs, or gallops  LUNGS: b/l wheezes heard  ABDOMEN: Soft, nontender, nondistended, +BS  EXTREMITIES: 2+ peripheral pulses bilaterally. No clubbing, cyanosis, or edema  NERVOUS SYSTEM:  A&Ox3, no focal deficits   SKIN: +skin tenting, No rashes or lesions    LABS:                        10.2   7.81  )-----------( 173      ( 09 Apr 2023 05:40 )             33.8                     RADIOLOGY & ADDITIONAL TESTS:    Imaging Personally Reviewed:    Consultant(s) Notes Reviewed:      Care Discussed with Consultants/Other Providers:   Peyman Sim, PGY2    DATE OF SERVICE: 04-09-23 @ 07:06    Patient is a 68y old  Female who presents with a chief complaint of SOB (08 Apr 2023 07:34)      SUBJECTIVE / OVERNIGHT EVENTS: Patient with glucose 73 overnight, given cookies. [employer] order changed to match formulary order. AVAPS order was discontinued 4/7 per chart. Subjective dyspnea this AM, but pulse ox .     MEDICATIONS  (STANDING):  acetylcysteine 20%  Inhalation 2 milliLiter(s) Inhalation two times a day  albuterol/ipratropium for Nebulization 3 milliLiter(s) Nebulizer every 6 hours  amLODIPine   Tablet 5 milliGRAM(s) Oral daily  azithromycin   Tablet 500 milliGRAM(s) Oral <User Schedule>  budesonide 160 MICROgram(s)/formoterol 4.5 MICROgram(s) Inhaler 2 Puff(s) Inhalation two times a day  chlorhexidine 2% Cloths 1 Application(s) Topical daily  dextrose 5%. 1000 milliLiter(s) (100 mL/Hr) IV Continuous <Continuous>  dextrose 5%. 1000 milliLiter(s) (50 mL/Hr) IV Continuous <Continuous>  dextrose 50% Injectable 25 Gram(s) IV Push once  dextrose 50% Injectable 12.5 Gram(s) IV Push once  dextrose 50% Injectable 25 Gram(s) IV Push once  enoxaparin Injectable 30 milliGRAM(s) SubCutaneous every 24 hours  fluticasone propionate 50 MICROgram(s)/spray Nasal Spray 1 Spray(s) Both Nostrils two times a day  glucagon  Injectable 1 milliGRAM(s) IntraMuscular once  influenza  Vaccine (HIGH DOSE) 0.7 milliLiter(s) IntraMuscular once  insulin glargine Injectable (LANTUS) 10 Unit(s) SubCutaneous at bedtime  insulin lispro (ADMELOG) corrective regimen sliding scale   SubCutaneous three times a day before meals  insulin lispro (ADMELOG) corrective regimen sliding scale   SubCutaneous at bedtime  insulin lispro Injectable (ADMELOG) 9 Unit(s) SubCutaneous three times a day before meals  ketotifen 0.025% Ophthalmic Solution 1 Drop(s) Both EYES two times a day  lactobacillus acidophilus 1 Tablet(s) Oral daily  melatonin 6 milliGRAM(s) Oral at bedtime  metoprolol succinate ER 50 milliGRAM(s) Oral daily  mirtazapine 7.5 milliGRAM(s) Oral at bedtime  montelukast 10 milliGRAM(s) Oral daily  mupirocin 2% Ointment 1 Application(s) Topical two times a day  nystatin    Suspension 959003 Unit(s) Oral four times a day  pantoprazole    Tablet 40 milliGRAM(s) Oral before breakfast  polyethylene glycol 3350 17 Gram(s) Oral two times a day  senna 2 Tablet(s) Oral at bedtime  sodium chloride 3%  Inhalation 4 milliLiter(s) Inhalation every 12 hours  tamsulosin 0.4 milliGRAM(s) Oral at bedtime    MEDICATIONS  (PRN):  acetaminophen     Tablet .. 650 milliGRAM(s) Oral every 6 hours PRN Temp greater or equal to 38C (100.4F), Mild Pain (1 - 3)  ALPRAZolam 0.25 milliGRAM(s) Oral every 8 hours PRN anxiety  aluminum hydroxide/magnesium hydroxide/simethicone Suspension 30 milliLiter(s) Oral every 4 hours PRN Dyspepsia  bisacodyl Suppository 10 milliGRAM(s) Rectal daily PRN Constipation  dextrose Oral Gel 15 Gram(s) Oral once PRN Blood Glucose LESS THAN 70 milliGRAM(s)/deciliter  ondansetron Injectable 4 milliGRAM(s) IV Push every 8 hours PRN Nausea and/or Vomiting      Vital Signs Last 24 Hrs  T(C): 36.4 (09 Apr 2023 05:22), Max: 36.7 (08 Apr 2023 14:22)  T(F): 97.6 (09 Apr 2023 05:22), Max: 98.1 (08 Apr 2023 14:22)  HR: 88 (09 Apr 2023 05:22) (78 - 98)  BP: 121/68 (09 Apr 2023 05:22) (107/55 - 127/69)  BP(mean): --  RR: 18 (09 Apr 2023 05:22) (18 - 19)  SpO2: 97% (09 Apr 2023 05:22) (97% - 100%)    Parameters below as of 09 Apr 2023 05:22  Patient On (Oxygen Delivery Method): nasal cannula  O2 Flow (L/min): 2    CAPILLARY BLOOD GLUCOSE      POCT Blood Glucose.: 123 mg/dL (08 Apr 2023 22:22)  POCT Blood Glucose.: 94 mg/dL (08 Apr 2023 22:22)  POCT Blood Glucose.: 72 mg/dL (08 Apr 2023 21:50)  POCT Blood Glucose.: 167 mg/dL (08 Apr 2023 17:13)  POCT Blood Glucose.: 235 mg/dL (08 Apr 2023 12:12)  POCT Blood Glucose.: 296 mg/dL (08 Apr 2023 08:59)    I&O's Summary      PHYSICAL EXAM:  GENERAL: NAD, lying in bed comfortable, appears older than chronological age, Nasal cannula in place with no respiratory distress.   HEAD:  Atraumatic, normocephalic  EYES: EOMI, PERRLA, conjunctiva and sclera clear  ENT: dry mucous membranes, white film appreciated on tongue  NECK: Supple, no JVD  HEART: Tachycardic rate, regular rhythm, no murmurs, rubs, or gallops  LUNGS: b/l wheezes heard  ABDOMEN: Soft, nontender, nondistended, +BS  EXTREMITIES: 2+ peripheral pulses bilaterally. No clubbing, cyanosis, or edema  NERVOUS SYSTEM:  A&Ox3, no focal deficits   SKIN: +skin tenting, No rashes or lesions    LABS:                        10.2   7.81  )-----------( 173      ( 09 Apr 2023 05:40 )             33.8                     RADIOLOGY & ADDITIONAL TESTS:    Imaging Personally Reviewed:    Consultant(s) Notes Reviewed:      Care Discussed with Consultants/Other Providers:   Peyman Sim, PGY2    DATE OF SERVICE: 04-09-23 @ 07:06    Patient is a 68y old  Female who presents with a chief complaint of SOB (08 Apr 2023 07:34)      SUBJECTIVE / OVERNIGHT EVENTS: Patient with glucose 73 overnight, given cookies. [employer] order changed to match formulary order. AVAPS order was discontinued 4/7 per chart. Subjective dyspnea this AM, but pulse ox .     [facility]  ID Leodan [id number]    MEDICATIONS  (STANDING):  acetylcysteine 20%  Inhalation 2 milliLiter(s) Inhalation two times a day  albuterol/ipratropium for Nebulization 3 milliLiter(s) Nebulizer every 6 hours  amLODIPine   Tablet 5 milliGRAM(s) Oral daily  azithromycin   Tablet 500 milliGRAM(s) Oral <User Schedule>  budesonide 160 MICROgram(s)/formoterol 4.5 MICROgram(s) Inhaler 2 Puff(s) Inhalation two times a day  chlorhexidine 2% Cloths 1 Application(s) Topical daily  dextrose 5%. 1000 milliLiter(s) (100 mL/Hr) IV Continuous <Continuous>  dextrose 5%. 1000 milliLiter(s) (50 mL/Hr) IV Continuous <Continuous>  dextrose 50% Injectable 25 Gram(s) IV Push once  dextrose 50% Injectable 12.5 Gram(s) IV Push once  dextrose 50% Injectable 25 Gram(s) IV Push once  enoxaparin Injectable 30 milliGRAM(s) SubCutaneous every 24 hours  fluticasone propionate 50 MICROgram(s)/spray Nasal Spray 1 Spray(s) Both Nostrils two times a day  glucagon  Injectable 1 milliGRAM(s) IntraMuscular once  influenza  Vaccine (HIGH DOSE) 0.7 milliLiter(s) IntraMuscular once  insulin glargine Injectable (LANTUS) 10 Unit(s) SubCutaneous at bedtime  insulin lispro (ADMELOG) corrective regimen sliding scale   SubCutaneous three times a day before meals  insulin lispro (ADMELOG) corrective regimen sliding scale   SubCutaneous at bedtime  insulin lispro Injectable (ADMELOG) 9 Unit(s) SubCutaneous three times a day before meals  ketotifen 0.025% Ophthalmic Solution 1 Drop(s) Both EYES two times a day  lactobacillus acidophilus 1 Tablet(s) Oral daily  melatonin 6 milliGRAM(s) Oral at bedtime  metoprolol succinate ER 50 milliGRAM(s) Oral daily  mirtazapine 7.5 milliGRAM(s) Oral at bedtime  montelukast 10 milliGRAM(s) Oral daily  mupirocin 2% Ointment 1 Application(s) Topical two times a day  nystatin    Suspension 032280 Unit(s) Oral four times a day  pantoprazole    Tablet 40 milliGRAM(s) Oral before breakfast  polyethylene glycol 3350 17 Gram(s) Oral two times a day  senna 2 Tablet(s) Oral at bedtime  sodium chloride 3%  Inhalation 4 milliLiter(s) Inhalation every 12 hours  tamsulosin 0.4 milliGRAM(s) Oral at bedtime    MEDICATIONS  (PRN):  acetaminophen     Tablet .. 650 milliGRAM(s) Oral every 6 hours PRN Temp greater or equal to 38C (100.4F), Mild Pain (1 - 3)  ALPRAZolam 0.25 milliGRAM(s) Oral every 8 hours PRN anxiety  aluminum hydroxide/magnesium hydroxide/simethicone Suspension 30 milliLiter(s) Oral every 4 hours PRN Dyspepsia  bisacodyl Suppository 10 milliGRAM(s) Rectal daily PRN Constipation  dextrose Oral Gel 15 Gram(s) Oral once PRN Blood Glucose LESS THAN 70 milliGRAM(s)/deciliter  ondansetron Injectable 4 milliGRAM(s) IV Push every 8 hours PRN Nausea and/or Vomiting      Vital Signs Last 24 Hrs  T(C): 36.4 (09 Apr 2023 05:22), Max: 36.7 (08 Apr 2023 14:22)  T(F): 97.6 (09 Apr 2023 05:22), Max: 98.1 (08 Apr 2023 14:22)  HR: 88 (09 Apr 2023 05:22) (78 - 98)  BP: 121/68 (09 Apr 2023 05:22) (107/55 - 127/69)  BP(mean): --  RR: 18 (09 Apr 2023 05:22) (18 - 19)  SpO2: 97% (09 Apr 2023 05:22) (97% - 100%)    Parameters below as of 09 Apr 2023 05:22  Patient On (Oxygen Delivery Method): nasal cannula  O2 Flow (L/min): 2    CAPILLARY BLOOD GLUCOSE      POCT Blood Glucose.: 123 mg/dL (08 Apr 2023 22:22)  POCT Blood Glucose.: 94 mg/dL (08 Apr 2023 22:22)  POCT Blood Glucose.: 72 mg/dL (08 Apr 2023 21:50)  POCT Blood Glucose.: 167 mg/dL (08 Apr 2023 17:13)  POCT Blood Glucose.: 235 mg/dL (08 Apr 2023 12:12)  POCT Blood Glucose.: 296 mg/dL (08 Apr 2023 08:59)    I&O's Summary      PHYSICAL EXAM:  GENERAL: NAD, lying in bed comfortable, appears older than chronological age, Nasal cannula in place with no respiratory distress.   HEAD:  Atraumatic, normocephalic  EYES: EOMI, PERRLA, conjunctiva and sclera clear  ENT: dry mucous membranes, white film appreciated on tongue  NECK: Supple, no JVD  HEART: Tachycardic rate, regular rhythm, no murmurs, rubs, or gallops  LUNGS: b/l wheezes heard  ABDOMEN: Soft, nontender, nondistended, +BS  EXTREMITIES: 2+ peripheral pulses bilaterally. No clubbing, cyanosis, or edema  NERVOUS SYSTEM:  A&Ox3, no focal deficits   SKIN: +skin tenting, No rashes or lesions    LABS:                        10.2   7.81  )-----------( 173      ( 09 Apr 2023 05:40 )             33.8                     RADIOLOGY & ADDITIONAL TESTS:    Imaging Personally Reviewed:    Consultant(s) Notes Reviewed:      Care Discussed with Consultants/Other Providers:

## 2023-04-09 NOTE — PROGRESS NOTE ADULT - PROBLEM SELECTOR PLAN 3
- pt constipated per HPI and CT scan  - no bowel obstruction appreciated on exam, abd soft non-tender  - senna, miralax BID, dulcolax - pt constipated per HPI and CT scan  - no bowel obstruction appreciated on exam, abd soft non-tender  - senna, miralax BID, dulcolax  - added dulcolax tab 1x dose 4/9

## 2023-04-09 NOTE — PROGRESS NOTE ADULT - PROBLEM SELECTOR PLAN 6
- DVT: lovenox  - diet: soft and bite sized   - dispo: Home PT   - Communication: 4/7 spoke to  and son - DVT: lovenox  - diet: soft and bite sized   - dispo: Home PT   - communication: spoke with son 12:03PM  4/9

## 2023-04-09 NOTE — PROGRESS NOTE ADULT - ATTENDING COMMENTS
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  Pending AVAPS approval for dispo home.  Resume home insulin regimen , avoid hypoglycemia   DC planning

## 2023-04-10 ENCOUNTER — TRANSCRIPTION ENCOUNTER (OUTPATIENT)
Age: 68
End: 2023-04-10

## 2023-04-10 LAB
ANION GAP SERPL CALC-SCNC: 5 MMOL/L — LOW (ref 7–14)
BUN SERPL-MCNC: 16 MG/DL — SIGNIFICANT CHANGE UP (ref 7–23)
CALCIUM SERPL-MCNC: 8.8 MG/DL — SIGNIFICANT CHANGE UP (ref 8.4–10.5)
CHLORIDE SERPL-SCNC: 98 MMOL/L — SIGNIFICANT CHANGE UP (ref 98–107)
CO2 SERPL-SCNC: 36 MMOL/L — HIGH (ref 22–31)
CREAT SERPL-MCNC: 0.33 MG/DL — LOW (ref 0.5–1.3)
EGFR: 114 ML/MIN/1.73M2 — SIGNIFICANT CHANGE UP
GLUCOSE BLDC GLUCOMTR-MCNC: 109 MG/DL — HIGH (ref 70–99)
GLUCOSE BLDC GLUCOMTR-MCNC: 202 MG/DL — HIGH (ref 70–99)
GLUCOSE BLDC GLUCOMTR-MCNC: 218 MG/DL — HIGH (ref 70–99)
GLUCOSE BLDC GLUCOMTR-MCNC: 244 MG/DL — HIGH (ref 70–99)
GLUCOSE BLDC GLUCOMTR-MCNC: 63 MG/DL — LOW (ref 70–99)
GLUCOSE BLDC GLUCOMTR-MCNC: 67 MG/DL — LOW (ref 70–99)
GLUCOSE SERPL-MCNC: 94 MG/DL — SIGNIFICANT CHANGE UP (ref 70–99)
HCT VFR BLD CALC: 30.6 % — LOW (ref 34.5–45)
HGB BLD-MCNC: 9.3 G/DL — LOW (ref 11.5–15.5)
MAGNESIUM SERPL-MCNC: 2.2 MG/DL — SIGNIFICANT CHANGE UP (ref 1.6–2.6)
MCHC RBC-ENTMCNC: 26.7 PG — LOW (ref 27–34)
MCHC RBC-ENTMCNC: 30.4 GM/DL — LOW (ref 32–36)
MCV RBC AUTO: 87.9 FL — SIGNIFICANT CHANGE UP (ref 80–100)
NRBC # BLD: 0 /100 WBCS — SIGNIFICANT CHANGE UP (ref 0–0)
NRBC # FLD: 0 K/UL — SIGNIFICANT CHANGE UP (ref 0–0)
PHOSPHATE SERPL-MCNC: 2.8 MG/DL — SIGNIFICANT CHANGE UP (ref 2.5–4.5)
PLATELET # BLD AUTO: 161 K/UL — SIGNIFICANT CHANGE UP (ref 150–400)
POTASSIUM SERPL-MCNC: 4.1 MMOL/L — SIGNIFICANT CHANGE UP (ref 3.5–5.3)
POTASSIUM SERPL-SCNC: 4.1 MMOL/L — SIGNIFICANT CHANGE UP (ref 3.5–5.3)
RBC # BLD: 3.48 M/UL — LOW (ref 3.8–5.2)
RBC # FLD: 14.2 % — SIGNIFICANT CHANGE UP (ref 10.3–14.5)
SODIUM SERPL-SCNC: 139 MMOL/L — SIGNIFICANT CHANGE UP (ref 135–145)
WBC # BLD: 10.47 K/UL — SIGNIFICANT CHANGE UP (ref 3.8–10.5)
WBC # FLD AUTO: 10.47 K/UL — SIGNIFICANT CHANGE UP (ref 3.8–10.5)

## 2023-04-10 PROCEDURE — 99232 SBSQ HOSP IP/OBS MODERATE 35: CPT | Mod: GC

## 2023-04-10 PROCEDURE — 93010 ELECTROCARDIOGRAM REPORT: CPT

## 2023-04-10 RX ORDER — INSULIN GLARGINE 100 [IU]/ML
6 INJECTION, SOLUTION SUBCUTANEOUS AT BEDTIME
Refills: 0 | Status: DISCONTINUED | OUTPATIENT
Start: 2023-04-10 | End: 2023-04-11

## 2023-04-10 RX ADMIN — SODIUM CHLORIDE 4 MILLILITER(S): 9 INJECTION INTRAMUSCULAR; INTRAVENOUS; SUBCUTANEOUS at 21:06

## 2023-04-10 RX ADMIN — PANTOPRAZOLE SODIUM 40 MILLIGRAM(S): 20 TABLET, DELAYED RELEASE ORAL at 06:08

## 2023-04-10 RX ADMIN — KETOTIFEN FUMARATE 1 DROP(S): 0.34 SOLUTION OPHTHALMIC at 06:50

## 2023-04-10 RX ADMIN — Medication 500000 UNIT(S): at 06:09

## 2023-04-10 RX ADMIN — MIRTAZAPINE 7.5 MILLIGRAM(S): 45 TABLET, ORALLY DISINTEGRATING ORAL at 21:53

## 2023-04-10 RX ADMIN — MUPIROCIN 1 APPLICATION(S): 20 OINTMENT TOPICAL at 06:08

## 2023-04-10 RX ADMIN — Medication 500000 UNIT(S): at 12:43

## 2023-04-10 RX ADMIN — Medication 2 MILLILITER(S): at 21:05

## 2023-04-10 RX ADMIN — Medication 30 MILLILITER(S): at 13:32

## 2023-04-10 RX ADMIN — Medication 50 MILLIGRAM(S): at 06:09

## 2023-04-10 RX ADMIN — Medication 3 MILLILITER(S): at 04:00

## 2023-04-10 RX ADMIN — MONTELUKAST 10 MILLIGRAM(S): 4 TABLET, CHEWABLE ORAL at 12:44

## 2023-04-10 RX ADMIN — BUDESONIDE AND FORMOTEROL FUMARATE DIHYDRATE 2 PUFF(S): 160; 4.5 AEROSOL RESPIRATORY (INHALATION) at 09:13

## 2023-04-10 RX ADMIN — Medication 650 MILLIGRAM(S): at 22:05

## 2023-04-10 RX ADMIN — Medication 3 MILLILITER(S): at 11:04

## 2023-04-10 RX ADMIN — Medication 2: at 12:44

## 2023-04-10 RX ADMIN — Medication 2 MILLILITER(S): at 11:15

## 2023-04-10 RX ADMIN — Medication 3 MILLILITER(S): at 21:05

## 2023-04-10 RX ADMIN — SODIUM CHLORIDE 4 MILLILITER(S): 9 INJECTION INTRAMUSCULAR; INTRAVENOUS; SUBCUTANEOUS at 11:04

## 2023-04-10 RX ADMIN — KETOTIFEN FUMARATE 1 DROP(S): 0.34 SOLUTION OPHTHALMIC at 21:52

## 2023-04-10 RX ADMIN — AMLODIPINE BESYLATE 5 MILLIGRAM(S): 2.5 TABLET ORAL at 06:09

## 2023-04-10 RX ADMIN — TAMSULOSIN HYDROCHLORIDE 0.4 MILLIGRAM(S): 0.4 CAPSULE ORAL at 21:51

## 2023-04-10 RX ADMIN — SENNA PLUS 2 TABLET(S): 8.6 TABLET ORAL at 21:52

## 2023-04-10 RX ADMIN — Medication 1 SPRAY(S): at 18:14

## 2023-04-10 RX ADMIN — Medication 650 MILLIGRAM(S): at 22:35

## 2023-04-10 RX ADMIN — POLYETHYLENE GLYCOL 3350 17 GRAM(S): 17 POWDER, FOR SOLUTION ORAL at 18:15

## 2023-04-10 RX ADMIN — Medication 2: at 18:12

## 2023-04-10 RX ADMIN — MUPIROCIN 1 APPLICATION(S): 20 OINTMENT TOPICAL at 18:15

## 2023-04-10 RX ADMIN — Medication 3 MILLILITER(S): at 17:26

## 2023-04-10 RX ADMIN — BUDESONIDE AND FORMOTEROL FUMARATE DIHYDRATE 2 PUFF(S): 160; 4.5 AEROSOL RESPIRATORY (INHALATION) at 21:51

## 2023-04-10 RX ADMIN — ENOXAPARIN SODIUM 30 MILLIGRAM(S): 100 INJECTION SUBCUTANEOUS at 21:52

## 2023-04-10 RX ADMIN — Medication 500000 UNIT(S): at 18:15

## 2023-04-10 RX ADMIN — Medication 1 SPRAY(S): at 06:08

## 2023-04-10 RX ADMIN — Medication 6 MILLIGRAM(S): at 21:52

## 2023-04-10 RX ADMIN — Medication 1 TABLET(S): at 12:43

## 2023-04-10 RX ADMIN — INSULIN GLARGINE 6 UNIT(S): 100 INJECTION, SOLUTION SUBCUTANEOUS at 22:28

## 2023-04-10 NOTE — PROGRESS NOTE ADULT - SUBJECTIVE AND OBJECTIVE BOX
Patient is a 68y old  Female who presents with a chief complaint of SOB (09 Apr 2023 07:06)      SUBJECTIVE / OVERNIGHT EVENTS:    MEDICATIONS  (STANDING):  acetylcysteine 20%  Inhalation 2 milliLiter(s) Inhalation two times a day  albuterol/ipratropium for Nebulization 3 milliLiter(s) Nebulizer every 6 hours  amLODIPine   Tablet 5 milliGRAM(s) Oral daily  azithromycin   Tablet 500 milliGRAM(s) Oral <User Schedule>  budesonide 160 MICROgram(s)/formoterol 4.5 MICROgram(s) Inhaler 2 Puff(s) Inhalation two times a day  chlorhexidine 2% Cloths 1 Application(s) Topical daily  dextrose 5%. 1000 milliLiter(s) (50 mL/Hr) IV Continuous <Continuous>  dextrose 5%. 1000 milliLiter(s) (100 mL/Hr) IV Continuous <Continuous>  dextrose 50% Injectable 25 Gram(s) IV Push once  dextrose 50% Injectable 12.5 Gram(s) IV Push once  dextrose 50% Injectable 25 Gram(s) IV Push once  enoxaparin Injectable 30 milliGRAM(s) SubCutaneous every 24 hours  fluticasone propionate 50 MICROgram(s)/spray Nasal Spray 1 Spray(s) Both Nostrils two times a day  glucagon  Injectable 1 milliGRAM(s) IntraMuscular once  influenza  Vaccine (HIGH DOSE) 0.7 milliLiter(s) IntraMuscular once  insulin glargine Injectable (LANTUS) 8 Unit(s) SubCutaneous at bedtime  insulin lispro (ADMELOG) corrective regimen sliding scale   SubCutaneous three times a day before meals  insulin lispro (ADMELOG) corrective regimen sliding scale   SubCutaneous at bedtime  ketotifen 0.025% Ophthalmic Solution 1 Drop(s) Both EYES two times a day  lactobacillus acidophilus 1 Tablet(s) Oral daily  melatonin 6 milliGRAM(s) Oral at bedtime  metoprolol succinate ER 50 milliGRAM(s) Oral daily  mirtazapine 7.5 milliGRAM(s) Oral at bedtime  montelukast 10 milliGRAM(s) Oral daily  mupirocin 2% Ointment 1 Application(s) Topical two times a day  nystatin    Suspension 045448 Unit(s) Oral four times a day  pantoprazole    Tablet 40 milliGRAM(s) Oral before breakfast  polyethylene glycol 3350 17 Gram(s) Oral two times a day  senna 2 Tablet(s) Oral at bedtime  sodium chloride 3%  Inhalation 4 milliLiter(s) Inhalation every 12 hours  tamsulosin 0.4 milliGRAM(s) Oral at bedtime    MEDICATIONS  (PRN):  acetaminophen     Tablet .. 650 milliGRAM(s) Oral every 6 hours PRN Temp greater or equal to 38C (100.4F), Mild Pain (1 - 3)  ALPRAZolam 0.25 milliGRAM(s) Oral every 8 hours PRN anxiety  aluminum hydroxide/magnesium hydroxide/simethicone Suspension 30 milliLiter(s) Oral every 4 hours PRN Dyspepsia  bisacodyl Suppository 10 milliGRAM(s) Rectal daily PRN Constipation  dextrose Oral Gel 15 Gram(s) Oral once PRN Blood Glucose LESS THAN 70 milliGRAM(s)/deciliter  ondansetron Injectable 4 milliGRAM(s) IV Push every 8 hours PRN Nausea and/or Vomiting      CAPILLARY BLOOD GLUCOSE      POCT Blood Glucose.: 279 mg/dL (09 Apr 2023 22:34)  POCT Blood Glucose.: 192 mg/dL (09 Apr 2023 17:04)  POCT Blood Glucose.: 129 mg/dL (09 Apr 2023 12:38)  POCT Blood Glucose.: 44 mg/dL (09 Apr 2023 12:10)  POCT Blood Glucose.: 40 mg/dL (09 Apr 2023 12:09)  POCT Blood Glucose.: 236 mg/dL (09 Apr 2023 08:45)    I&O's Summary      Vital Signs Last 24 Hrs  T(C): 36.3 (10 Apr 2023 06:05), Max: 36.7 (09 Apr 2023 14:45)  T(F): 97.3 (10 Apr 2023 06:05), Max: 98 (09 Apr 2023 14:45)  HR: 80 (10 Apr 2023 06:05) (79 - 92)  BP: 100/57 (10 Apr 2023 06:05) (100/57 - 122/66)  BP(mean): --  RR: 19 (10 Apr 2023 06:05) (18 - 19)  SpO2: 98% (10 Apr 2023 06:05) (97% - 100%)    Parameters below as of 10 Apr 2023 06:05  Patient On (Oxygen Delivery Method): nasal cannula  O2 Flow (L/min): 3      PHYSICAL EXAM:  GENERAL: NAD, well-developed  HEAD: Atraumatic, Normocephalic  EYES: EOMI, PERRLA, conjunctiva and sclera clear  NECK: Supple, No JVD  CHEST/LUNG: Clear to auscultation bilaterally; No wheezes or crackles  HEART: Normal S1/S2; Regular rate and rhythm; No murmurs, rubs, or gallops  ABDOMEN: Soft, Nontender, Nondistended; Bowel sounds present  EXTREMITIES: 2+ Peripheral Pulses; No clubbing, cyanosis, or edema  PSYCH: A&Ox3  NEUROLOGY: no focal neurologic deficit  SKIN: No rashes or lesions    LABS:                        9.3    10.47 )-----------( 161      ( 10 Apr 2023 06:00 )             30.6      04-10    139  |  98  |  16  ----------------------------<  94  4.1   |  36<H>  |  0.33<L>    Ca    8.8      10 Apr 2023 06:00  Phos  2.8     04-10  Mg     2.20     04-10                RADIOLOGY & ADDITIONAL TESTS:    Imaging Personally Reviewed:    Consultant(s) Notes Reviewed:      Care Discussed with Consultants/Other Providers:   Patient is a 68y old  Female who presents with a chief complaint of SOB (09 Apr 2023 07:06)      SUBJECTIVE / OVERNIGHT EVENTS:    No o/n events.  Patient complaining of hoarse voice, mucous, and gas.  Hypoglycemic this am to 60s, drank apple juice.  Lantus decreased to 6u, premeal insulin held.    MEDICATIONS  (STANDING):  acetylcysteine 20%  Inhalation 2 milliLiter(s) Inhalation two times a day  albuterol/ipratropium for Nebulization 3 milliLiter(s) Nebulizer every 6 hours  amLODIPine   Tablet 5 milliGRAM(s) Oral daily  azithromycin   Tablet 500 milliGRAM(s) Oral <User Schedule>  budesonide 160 MICROgram(s)/formoterol 4.5 MICROgram(s) Inhaler 2 Puff(s) Inhalation two times a day  chlorhexidine 2% Cloths 1 Application(s) Topical daily  dextrose 5%. 1000 milliLiter(s) (50 mL/Hr) IV Continuous <Continuous>  dextrose 5%. 1000 milliLiter(s) (100 mL/Hr) IV Continuous <Continuous>  dextrose 50% Injectable 25 Gram(s) IV Push once  dextrose 50% Injectable 12.5 Gram(s) IV Push once  dextrose 50% Injectable 25 Gram(s) IV Push once  enoxaparin Injectable 30 milliGRAM(s) SubCutaneous every 24 hours  fluticasone propionate 50 MICROgram(s)/spray Nasal Spray 1 Spray(s) Both Nostrils two times a day  glucagon  Injectable 1 milliGRAM(s) IntraMuscular once  influenza  Vaccine (HIGH DOSE) 0.7 milliLiter(s) IntraMuscular once  insulin glargine Injectable (LANTUS) 8 Unit(s) SubCutaneous at bedtime  insulin lispro (ADMELOG) corrective regimen sliding scale   SubCutaneous three times a day before meals  insulin lispro (ADMELOG) corrective regimen sliding scale   SubCutaneous at bedtime  ketotifen 0.025% Ophthalmic Solution 1 Drop(s) Both EYES two times a day  lactobacillus acidophilus 1 Tablet(s) Oral daily  melatonin 6 milliGRAM(s) Oral at bedtime  metoprolol succinate ER 50 milliGRAM(s) Oral daily  mirtazapine 7.5 milliGRAM(s) Oral at bedtime  montelukast 10 milliGRAM(s) Oral daily  mupirocin 2% Ointment 1 Application(s) Topical two times a day  nystatin    Suspension 505758 Unit(s) Oral four times a day  pantoprazole    Tablet 40 milliGRAM(s) Oral before breakfast  polyethylene glycol 3350 17 Gram(s) Oral two times a day  senna 2 Tablet(s) Oral at bedtime  sodium chloride 3%  Inhalation 4 milliLiter(s) Inhalation every 12 hours  tamsulosin 0.4 milliGRAM(s) Oral at bedtime    MEDICATIONS  (PRN):  acetaminophen     Tablet .. 650 milliGRAM(s) Oral every 6 hours PRN Temp greater or equal to 38C (100.4F), Mild Pain (1 - 3)  ALPRAZolam 0.25 milliGRAM(s) Oral every 8 hours PRN anxiety  aluminum hydroxide/magnesium hydroxide/simethicone Suspension 30 milliLiter(s) Oral every 4 hours PRN Dyspepsia  bisacodyl Suppository 10 milliGRAM(s) Rectal daily PRN Constipation  dextrose Oral Gel 15 Gram(s) Oral once PRN Blood Glucose LESS THAN 70 milliGRAM(s)/deciliter  ondansetron Injectable 4 milliGRAM(s) IV Push every 8 hours PRN Nausea and/or Vomiting      CAPILLARY BLOOD GLUCOSE      POCT Blood Glucose.: 279 mg/dL (09 Apr 2023 22:34)  POCT Blood Glucose.: 192 mg/dL (09 Apr 2023 17:04)  POCT Blood Glucose.: 129 mg/dL (09 Apr 2023 12:38)  POCT Blood Glucose.: 44 mg/dL (09 Apr 2023 12:10)  POCT Blood Glucose.: 40 mg/dL (09 Apr 2023 12:09)  POCT Blood Glucose.: 236 mg/dL (09 Apr 2023 08:45)    I&O's Summary      Vital Signs Last 24 Hrs  T(C): 36.3 (10 Apr 2023 06:05), Max: 36.7 (09 Apr 2023 14:45)  T(F): 97.3 (10 Apr 2023 06:05), Max: 98 (09 Apr 2023 14:45)  HR: 80 (10 Apr 2023 06:05) (79 - 92)  BP: 100/57 (10 Apr 2023 06:05) (100/57 - 122/66)  BP(mean): --  RR: 19 (10 Apr 2023 06:05) (18 - 19)  SpO2: 98% (10 Apr 2023 06:05) (97% - 100%)    Parameters below as of 10 Apr 2023 06:05  Patient On (Oxygen Delivery Method): nasal cannula  O2 Flow (L/min): 3      PHYSICAL EXAM:  GENERAL: NAD, lying in bed comfortable, appears older than chronological age, Nasal cannula in place with no respiratory distress.   HEAD:  Atraumatic, normocephalic  EYES: EOMI, PERRLA, conjunctiva and sclera clear  ENT: dry mucous membranes, white film appreciated on tongue  NECK: Supple, no JVD  HEART: Tachycardic rate, regular rhythm, no murmurs, rubs, or gallops  LUNGS: b/l wheezes heard  ABDOMEN: Soft, nontender, nondistended, +BS  EXTREMITIES: 2+ peripheral pulses bilaterally. No clubbing, cyanosis, or edema  NERVOUS SYSTEM:  A&Ox3, no focal deficits   SKIN: +skin tenting, No rashes or lesions    LABS:                        9.3    10.47 )-----------( 161      ( 10 Apr 2023 06:00 )             30.6      04-10    139  |  98  |  16  ----------------------------<  94  4.1   |  36<H>  |  0.33<L>    Ca    8.8      10 Apr 2023 06:00  Phos  2.8     04-10  Mg     2.20     04-10                RADIOLOGY & ADDITIONAL TESTS:    Imaging Personally Reviewed:    Consultant(s) Notes Reviewed:      Care Discussed with Consultants/Other Providers:

## 2023-04-10 NOTE — PROGRESS NOTE ADULT - PROBLEM SELECTOR PLAN 3
- pt constipated per HPI and CT scan  - no bowel obstruction appreciated on exam, abd soft non-tender  - senna, miralax BID, dulcolax  - added dulcolax tab 1x dose 4/9

## 2023-04-10 NOTE — CHART NOTE - NSCHARTNOTEFT_GEN_A_CORE
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  Pending AVAPS approval for dispo home.    Nutrition Course: Patient follow for nutrition assessment consult. Offered Leandro  through [employer], pt prefers her son to translate over the phone. Pt reports her appetite has been ok in hospital. PO intake as per RN flowsheet, various from 0-100%. Food preferences explored and honored to encourage PO intake. Pt is amenable to receive double portions of entree in hospital. Called Dietary office and ordered a second tray for the patient. Pt s/p VFSS s/s eval, SLP recommended solids with thin liquids. Pt's diet has been ordered Ensure Max 2x daily (300 joey, 60gm pro). Pt states she is not drinking it because she is afraid of sugar content in the supplement will increase her glucose level. Pt noted with hypoglycemia event on 4/8. Discussed the importance of consuming meals on time while on insulin, discouraged skipping meals. Explained Ensure Max has low sugar content, pt agreeable to continue drinking it. Nutrition education provided on nutrition label reading, and encouraged pt to consume small frequent meals. Advise pt to drink liquids in between her meals, not during meals to prevent early satiety. Pt c/o constipation and hemorrhoids which is causing her a lot of gas and resulting poor PO intake. Pt endorses her gassiness has relieved today s/p pt had BM yesterday and this morning with laxative used. Pt noted on bowel regimen. RN made aware. Will continue provide bowel regimen PRN. Recommend d/c low fat diet to encourage PO intake, as pt's gassiness is likely due to constipation.  pt continues on probiotics to promote GI health. RD to remain available for further nutritional interventions as indicated.            GI: WDL. Last BM 4/10    PO intake:  < 50% [x ] 50-75% [ x]   % [x ]      Anthropometrics: Height (cm): 157.5 (03-30)  Weight (kg): 39.5 (03-30)  BMI (kg/m2): 15.9 (03-30)    Edema: None reported at present.   Pressure Injuries: None reported at present.     __________________ Pertinent Medications__________________   MEDICATIONS  (STANDING):  acetylcysteine 20%  Inhalation 2 milliLiter(s) Inhalation two times a day  albuterol/ipratropium for Nebulization 3 milliLiter(s) Nebulizer every 6 hours  amLODIPine   Tablet 5 milliGRAM(s) Oral daily  azithromycin   Tablet 500 milliGRAM(s) Oral <User Schedule>  budesonide 160 MICROgram(s)/formoterol 4.5 MICROgram(s) Inhaler 2 Puff(s) Inhalation two times a day  dextrose 5%. 1000 milliLiter(s) (50 mL/Hr) IV Continuous <Continuous>  dextrose 5%. 1000 milliLiter(s) (100 mL/Hr) IV Continuous <Continuous>  dextrose 50% Injectable 25 Gram(s) IV Push once  dextrose 50% Injectable 12.5 Gram(s) IV Push once  dextrose 50% Injectable 25 Gram(s) IV Push once  enoxaparin Injectable 30 milliGRAM(s) SubCutaneous every 24 hours  fluticasone propionate 50 MICROgram(s)/spray Nasal Spray 1 Spray(s) Both Nostrils two times a day  glucagon  Injectable 1 milliGRAM(s) IntraMuscular once  influenza  Vaccine (HIGH DOSE) 0.7 milliLiter(s) IntraMuscular once  insulin glargine Injectable (LANTUS) 6 Unit(s) SubCutaneous at bedtime  insulin lispro (ADMELOG) corrective regimen sliding scale   SubCutaneous three times a day before meals  insulin lispro (ADMELOG) corrective regimen sliding scale   SubCutaneous at bedtime  ketotifen 0.025% Ophthalmic Solution 1 Drop(s) Both EYES two times a day  lactobacillus acidophilus 1 Tablet(s) Oral daily  melatonin 6 milliGRAM(s) Oral at bedtime  metoprolol succinate ER 50 milliGRAM(s) Oral daily  mirtazapine 7.5 milliGRAM(s) Oral at bedtime  montelukast 10 milliGRAM(s) Oral daily  mupirocin 2% Ointment 1 Application(s) Topical two times a day  nystatin    Suspension 072209 Unit(s) Oral four times a day  pantoprazole    Tablet 40 milliGRAM(s) Oral before breakfast  polyethylene glycol 3350 17 Gram(s) Oral two times a day  senna 2 Tablet(s) Oral at bedtime  sodium chloride 3%  Inhalation 4 milliLiter(s) Inhalation every 12 hours  tamsulosin 0.4 milliGRAM(s) Oral at bedtime    MEDICATIONS  (PRN):  acetaminophen     Tablet .. 650 milliGRAM(s) Oral every 6 hours PRN Temp greater or equal to 38C (100.4F), Mild Pain (1 - 3)  ALPRAZolam 0.25 milliGRAM(s) Oral every 8 hours PRN anxiety  aluminum hydroxide/magnesium hydroxide/simethicone Suspension 30 milliLiter(s) Oral every 4 hours PRN Dyspepsia  bisacodyl Suppository 10 milliGRAM(s) Rectal daily PRN Constipation  dextrose Oral Gel 15 Gram(s) Oral once PRN Blood Glucose LESS THAN 70 milliGRAM(s)/deciliter  ondansetron Injectable 4 milliGRAM(s) IV Push every 8 hours PRN Nausea and/or Vomiting      __________________ Pertinent Labs__________________   04-10 Na139 mmol/L Glu 94 mg/dL K+ 4.1 mmol/L Cr  0.33 mg/dL<L> BUN 16 mg/dL 04-10 Phos 2.8 mg/dL        POCT Blood Glucose.: 218 mg/dL (04-10-23 @ 12:03)  POCT Blood Glucose.: 109 mg/dL (04-10-23 @ 08:50)  POCT Blood Glucose.: 67 mg/dL (04-10-23 @ 08:29)  POCT Blood Glucose.: 63 mg/dL (04-10-23 @ 08:28)  POCT Blood Glucose.: 279 mg/dL (04-09-23 @ 22:34)  POCT Blood Glucose.: 192 mg/dL (04-09-23 @ 17:04)  POCT Blood Glucose.: 129 mg/dL (04-09-23 @ 12:38)  POCT Blood Glucose.: 44 mg/dL (04-09-23 @ 12:10)  POCT Blood Glucose.: 40 mg/dL (04-09-23 @ 12:09)  POCT Blood Glucose.: 236 mg/dL (04-09-23 @ 08:45)  POCT Blood Glucose.: 123 mg/dL (04-08-23 @ 22:22)  POCT Blood Glucose.: 94 mg/dL (04-08-23 @ 22:22)  POCT Blood Glucose.: 72 mg/dL (04-08-23 @ 21:50)  POCT Blood Glucose.: 167 mg/dL (04-08-23 @ 17:13)  POCT Blood Glucose.: 235 mg/dL (04-08-23 @ 12:12)  POCT Blood Glucose.: 296 mg/dL (04-08-23 @ 08:59)  POCT Blood Glucose.: 165 mg/dL (04-07-23 @ 22:10)  POCT Blood Glucose.: 176 mg/dL (04-07-23 @ 17:07)          Estimated Needs:   [x ] no change since previous assessment        Previous Nutrition Diagnosis: Severe malnutrition     Nutrition Diagnosis is [x ] ongoing       Recommendations:  1) Recommend d/c low fat diet to encourage PO intake.   2) Recommend continue with Ensure Max 2x daily (300 joey, 60gm pro) to provide optimal nutrition.   3) Recommend continue to provide bowel regimen to promote BMs.   4) Monitor PO intake, Labs, weights, BMs, and skin integrity.   5) Continue monitor POCT and adjust insulin PRN.   6) RD to remain available for further nutritional interventions as indicated.       Monitoring and Evaluation:      [ x] Tolerance to diet prescription [x ] weights [x ] follow up per protocol  [ ] other:

## 2023-04-10 NOTE — DISCHARGE NOTE NURSING/CASE MANAGEMENT/SOCIAL WORK - PATIENT PORTAL LINK FT
You can access the FollowMyHealth Patient Portal offered by [facility] by registering at the following website: [email or web address]. By joining [facility]’s FollowMyHealth portal, you will also be able to view your health information using other applications (apps) compatible with our system.

## 2023-04-10 NOTE — PROGRESS NOTE ADULT - PROBLEM SELECTOR PLAN 6
- DVT: lovenox  - diet: soft and bite sized   - dispo: Home PT   - communication: spoke with son 12:03PM  4/9

## 2023-04-10 NOTE — PROGRESS NOTE ADULT - ASSESSMENT
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  Pending AVAPS approval for dispo home. 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  Pending AVAPS approval for dispo home.

## 2023-04-10 NOTE — PROGRESS NOTE ADULT - ATTENDING COMMENTS
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  Pending AVAPS approval for dispo home.  Hospital course complicated by hypoglycemia, will decrease insulin dose, nutrition consult . DC planning.

## 2023-04-10 NOTE — DISCHARGE NOTE NURSING/CASE MANAGEMENT/SOCIAL WORK - NSDCFUADDAPPT_GEN_ALL_CORE_FT
APPTS ARE READY TO BE MADE: [ ] YES    Best Family or Patient Contact (if needed):    Additional Information about above appointments (if needed):    1: Pulmonology (primary ciliary disorder and COPD)  2: Gastroenterology (constipation)  3:     Other comments or requests:

## 2023-04-10 NOTE — PROGRESS NOTE ADULT - PROBLEM SELECTOR PLAN 4
- Home has 7U admelog TID and 8U lantus  - on 9 units premeal TID, ISS, 8 units of lantus qhs - Home has 7U admelog TID and 8U lantus  - due to hypoglycemic, stopped premeal and decrease lantus to 6u

## 2023-04-10 NOTE — PROVIDER CONTACT NOTE (HYPOGLYCEMIA EVENT) - NS PROVIDER CONTACT BACKGROUND-HYPO
Age: 68y    Gender: Female    POCT Blood Glucose:  218 mg/dL (04-10-23 @ 12:03)  109 mg/dL (04-10-23 @ 08:50)  67 mg/dL (04-10-23 @ 08:29)  63 mg/dL (04-10-23 @ 08:28)  279 mg/dL (04-09-23 @ 22:34)  192 mg/dL (04-09-23 @ 17:04)      eMAR:  insulin glargine Injectable (LANTUS)   8 Unit(s) SubCutaneous (04-09-23 @ 22:53)    insulin lispro (ADMELOG) corrective regimen sliding scale   2 Unit(s) SubCutaneous (04-10-23 @ 12:44)   1 Unit(s) SubCutaneous (04-09-23 @ 17:44)    insulin lispro (ADMELOG) corrective regimen sliding scale   1 Unit(s) SubCutaneous (04-09-23 @ 22:56)

## 2023-04-11 VITALS
HEART RATE: 83 BPM | OXYGEN SATURATION: 97 % | DIASTOLIC BLOOD PRESSURE: 55 MMHG | TEMPERATURE: 98 F | SYSTOLIC BLOOD PRESSURE: 134 MMHG | RESPIRATION RATE: 18 BRPM

## 2023-04-11 LAB
GLUCOSE BLDC GLUCOMTR-MCNC: 211 MG/DL — HIGH (ref 70–99)
GLUCOSE BLDC GLUCOMTR-MCNC: 214 MG/DL — HIGH (ref 70–99)
GLUCOSE BLDC GLUCOMTR-MCNC: 260 MG/DL — HIGH (ref 70–99)

## 2023-04-11 PROCEDURE — 99239 HOSP IP/OBS DSCHRG MGMT >30: CPT | Mod: GC

## 2023-04-11 RX ORDER — MONTELUKAST 4 MG/1
1 TABLET, CHEWABLE ORAL
Qty: 0 | Refills: 2 | DISCHARGE

## 2023-04-11 RX ORDER — AZITHROMYCIN 500 MG/1
1 TABLET, FILM COATED ORAL
Qty: 13 | Refills: 2
Start: 2023-04-11 | End: 2023-07-09

## 2023-04-11 RX ORDER — LACTOBACILLUS ACIDOPHILUS 100MM CELL
1 CAPSULE ORAL
Qty: 30 | Refills: 0
Start: 2023-04-11 | End: 2023-05-10

## 2023-04-11 RX ADMIN — Medication 3 MILLILITER(S): at 10:26

## 2023-04-11 RX ADMIN — Medication 3 MILLILITER(S): at 16:09

## 2023-04-11 RX ADMIN — SODIUM CHLORIDE 4 MILLILITER(S): 9 INJECTION INTRAMUSCULAR; INTRAVENOUS; SUBCUTANEOUS at 10:26

## 2023-04-11 RX ADMIN — Medication 30 MILLILITER(S): at 11:06

## 2023-04-11 RX ADMIN — BUDESONIDE AND FORMOTEROL FUMARATE DIHYDRATE 2 PUFF(S): 160; 4.5 AEROSOL RESPIRATORY (INHALATION) at 08:40

## 2023-04-11 RX ADMIN — PANTOPRAZOLE SODIUM 40 MILLIGRAM(S): 20 TABLET, DELAYED RELEASE ORAL at 05:38

## 2023-04-11 RX ADMIN — Medication 500000 UNIT(S): at 12:22

## 2023-04-11 RX ADMIN — Medication 50 MILLIGRAM(S): at 05:38

## 2023-04-11 RX ADMIN — Medication 3 MILLILITER(S): at 03:48

## 2023-04-11 RX ADMIN — MUPIROCIN 1 APPLICATION(S): 20 OINTMENT TOPICAL at 05:39

## 2023-04-11 RX ADMIN — AMLODIPINE BESYLATE 5 MILLIGRAM(S): 2.5 TABLET ORAL at 05:38

## 2023-04-11 RX ADMIN — KETOTIFEN FUMARATE 1 DROP(S): 0.34 SOLUTION OPHTHALMIC at 05:39

## 2023-04-11 RX ADMIN — Medication 2: at 08:43

## 2023-04-11 RX ADMIN — Medication 3: at 12:20

## 2023-04-11 RX ADMIN — MONTELUKAST 10 MILLIGRAM(S): 4 TABLET, CHEWABLE ORAL at 12:23

## 2023-04-11 RX ADMIN — POLYETHYLENE GLYCOL 3350 17 GRAM(S): 17 POWDER, FOR SOLUTION ORAL at 05:37

## 2023-04-11 RX ADMIN — Medication 1 SPRAY(S): at 05:39

## 2023-04-11 RX ADMIN — Medication 500000 UNIT(S): at 05:38

## 2023-04-11 RX ADMIN — Medication 1 TABLET(S): at 12:24

## 2023-04-11 RX ADMIN — Medication 2 MILLILITER(S): at 10:26

## 2023-04-11 NOTE — PROGRESS NOTE ADULT - PROBLEM SELECTOR PLAN 4
- Home has 7U admelog TID and 8U lantus  - due to hypoglycemic, stopped premeal and decrease lantus to 6u

## 2023-04-11 NOTE — PROGRESS NOTE ADULT - ASSESSMENT
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  Pending AVAPS approval for dispo home.

## 2023-04-11 NOTE — PROGRESS NOTE ADULT - NUTRITIONAL ASSESSMENT
This patient has been assessed with a concern for Malnutrition and has been determined to have a diagnosis/diagnoses of Severe protein-calorie malnutrition and Underweight (BMI < 19).    This patient is being managed with:   Diet Soft and Bite Sized-  Consistent Carbohydrate {No Snacks} (CSTCHO)  DASH/TLC {Sodium & Cholesterol Restricted} (DASH)  1200mL Fluid Restriction ([id number])  Supplement Feeding Modality:  Oral  Glucerna Shake Cans or Servings Per Day:  1       Frequency:  Three Times a day  Entered: Mar 30 2023  3:46PM  
This patient has been assessed with a concern for Malnutrition and has been determined to have a diagnosis/diagnoses of Severe protein-calorie malnutrition and Underweight (BMI < 19).    This patient is being managed with:   Diet Soft and Bite Sized-  Consistent Carbohydrate {No Snacks} (CSTCHO)  DASH/TLC {Sodium & Cholesterol Restricted} (DASH)  Low Fat (LOWFAT)  Supplement Feeding Modality:  Oral  Ensure Max Cans or Servings Per Day:  1       Frequency:  Two Times a day  Entered: Apr 4 2023  4:20PM  
This patient has been assessed with a concern for Malnutrition and has been determined to have a diagnosis/diagnoses of Severe protein-calorie malnutrition and Underweight (BMI < 19).    This patient is being managed with:   Diet Soft and Bite Sized-  Consistent Carbohydrate {No Snacks} (CSTCHO)  DASH/TLC {Sodium & Cholesterol Restricted} (DASH)  Supplement Feeding Modality:  Oral  Ensure Max Cans or Servings Per Day:  1       Frequency:  Two Times a day  Entered: Apr 10 2023  5:08PM  
This patient has been assessed with a concern for Malnutrition and has been determined to have a diagnosis/diagnoses of Severe protein-calorie malnutrition and Underweight (BMI < 19).    This patient is being managed with:   Diet Soft and Bite Sized-  Consistent Carbohydrate {No Snacks} (CSTCHO)  DASH/TLC {Sodium & Cholesterol Restricted} (DASH)  Low Fat (LOWFAT)  Supplement Feeding Modality:  Oral  Ensure Max Cans or Servings Per Day:  1       Frequency:  Two Times a day  Entered: Apr 4 2023  4:20PM  
This patient has been assessed with a concern for Malnutrition and has been determined to have a diagnosis/diagnoses of Severe protein-calorie malnutrition and Underweight (BMI < 19).    This patient is being managed with:   Diet Soft and Bite Sized-  Consistent Carbohydrate {No Snacks} (CSTCHO)  DASH/TLC {Sodium & Cholesterol Restricted} (DASH)  Low Fat (LOWFAT)  Supplement Feeding Modality:  Oral  Ensure Max Cans or Servings Per Day:  1       Frequency:  Two Times a day  Entered: Apr 4 2023  4:20PM  
This patient has been assessed with a concern for Malnutrition and has been determined to have a diagnosis/diagnoses of Severe protein-calorie malnutrition and Underweight (BMI < 19).    This patient is being managed with:   Diet Soft and Bite Sized-  Consistent Carbohydrate {No Snacks} (CSTCHO)  DASH/TLC {Sodium & Cholesterol Restricted} (DASH)  1200mL Fluid Restriction ([id number])  Supplement Feeding Modality:  Oral  Glucerna Shake Cans or Servings Per Day:  1       Frequency:  Three Times a day  Entered: Mar 30 2023  3:46PM  
This patient has been assessed with a concern for Malnutrition and has been determined to have a diagnosis/diagnoses of Severe protein-calorie malnutrition and Underweight (BMI < 19).    This patient is being managed with:   Diet Soft and Bite Sized-  Consistent Carbohydrate {No Snacks} (CSTCHO)  DASH/TLC {Sodium & Cholesterol Restricted} (DASH)  1200mL Fluid Restriction ([id number])  Supplement Feeding Modality:  Oral  Glucerna Shake Cans or Servings Per Day:  1       Frequency:  Three Times a day  Entered: Mar 30 2023  3:46PM  
This patient has been assessed with a concern for Malnutrition and has been determined to have a diagnosis/diagnoses of Severe protein-calorie malnutrition and Underweight (BMI < 19).    This patient is being managed with:   Diet Soft and Bite Sized-  Consistent Carbohydrate {No Snacks} (CSTCHO)  DASH/TLC {Sodium & Cholesterol Restricted} (DASH)  Low Fat (LOWFAT)  Supplement Feeding Modality:  Oral  Ensure Max Cans or Servings Per Day:  1       Frequency:  Two Times a day  Entered: Apr 4 2023  4:20PM  
This patient has been assessed with a concern for Malnutrition and has been determined to have a diagnosis/diagnoses of Severe protein-calorie malnutrition and Underweight (BMI < 19).    This patient is being managed with:   Diet Soft and Bite Sized-  Consistent Carbohydrate {No Snacks} (CSTCHO)  DASH/TLC {Sodium & Cholesterol Restricted} (DASH)  Low Fat (LOWFAT)  Supplement Feeding Modality:  Oral  Ensure Max Cans or Servings Per Day:  1       Frequency:  Two Times a day  Entered: Apr 4 2023  4:20PM  
This patient has been assessed with a concern for Malnutrition and has been determined to have a diagnosis/diagnoses of Severe protein-calorie malnutrition and Underweight (BMI < 19).    This patient is being managed with:   Diet Soft and Bite Sized-  Consistent Carbohydrate {No Snacks} (CSTCHO)  DASH/TLC {Sodium & Cholesterol Restricted} (DASH)  Entered: Apr  3 2023 10:37AM  
This patient has been assessed with a concern for Malnutrition and has been determined to have a diagnosis/diagnoses of Severe protein-calorie malnutrition and Underweight (BMI < 19).    This patient is being managed with:   Diet Soft and Bite Sized-  Consistent Carbohydrate {No Snacks} (CSTCHO)  DASH/TLC {Sodium & Cholesterol Restricted} (DASH)  Low Fat (LOWFAT)  Supplement Feeding Modality:  Oral  Ensure Max Cans or Servings Per Day:  1       Frequency:  Two Times a day  Entered: Apr 4 2023  4:20PM

## 2023-04-11 NOTE — PROGRESS NOTE ADULT - SUBJECTIVE AND OBJECTIVE BOX
Patient is a 68y old  Female who presents with a chief complaint of SOB (10 Apr 2023 07:46)      SUBJECTIVE / OVERNIGHT EVENTS:    MEDICATIONS  (STANDING):  acetylcysteine 20%  Inhalation 2 milliLiter(s) Inhalation two times a day  albuterol/ipratropium for Nebulization 3 milliLiter(s) Nebulizer every 6 hours  amLODIPine   Tablet 5 milliGRAM(s) Oral daily  azithromycin   Tablet 500 milliGRAM(s) Oral <User Schedule>  budesonide 160 MICROgram(s)/formoterol 4.5 MICROgram(s) Inhaler 2 Puff(s) Inhalation two times a day  dextrose 5%. 1000 milliLiter(s) (50 mL/Hr) IV Continuous <Continuous>  dextrose 5%. 1000 milliLiter(s) (100 mL/Hr) IV Continuous <Continuous>  dextrose 50% Injectable 25 Gram(s) IV Push once  dextrose 50% Injectable 12.5 Gram(s) IV Push once  dextrose 50% Injectable 25 Gram(s) IV Push once  enoxaparin Injectable 30 milliGRAM(s) SubCutaneous every 24 hours  fluticasone propionate 50 MICROgram(s)/spray Nasal Spray 1 Spray(s) Both Nostrils two times a day  glucagon  Injectable 1 milliGRAM(s) IntraMuscular once  influenza  Vaccine (HIGH DOSE) 0.7 milliLiter(s) IntraMuscular once  insulin glargine Injectable (LANTUS) 6 Unit(s) SubCutaneous at bedtime  insulin lispro (ADMELOG) corrective regimen sliding scale   SubCutaneous three times a day before meals  insulin lispro (ADMELOG) corrective regimen sliding scale   SubCutaneous at bedtime  ketotifen 0.025% Ophthalmic Solution 1 Drop(s) Both EYES two times a day  lactobacillus acidophilus 1 Tablet(s) Oral daily  melatonin 6 milliGRAM(s) Oral at bedtime  metoprolol succinate ER 50 milliGRAM(s) Oral daily  mirtazapine 7.5 milliGRAM(s) Oral at bedtime  montelukast 10 milliGRAM(s) Oral daily  mupirocin 2% Ointment 1 Application(s) Topical two times a day  nystatin    Suspension [id number] Unit(s) Oral four times a day  pantoprazole    Tablet 40 milliGRAM(s) Oral before breakfast  polyethylene glycol 3350 17 Gram(s) Oral two times a day  senna 2 Tablet(s) Oral at bedtime  sodium chloride 3%  Inhalation 4 milliLiter(s) Inhalation every 12 hours  tamsulosin 0.4 milliGRAM(s) Oral at bedtime    MEDICATIONS  (PRN):  acetaminophen     Tablet .. 650 milliGRAM(s) Oral every 6 hours PRN Temp greater or equal to 38C (100.4F), Mild Pain (1 - 3)  ALPRAZolam 0.25 milliGRAM(s) Oral every 8 hours PRN anxiety  aluminum hydroxide/magnesium hydroxide/simethicone Suspension 30 milliLiter(s) Oral every 4 hours PRN Dyspepsia  bisacodyl Suppository 10 milliGRAM(s) Rectal daily PRN Constipation  dextrose Oral Gel 15 Gram(s) Oral once PRN Blood Glucose LESS THAN 70 milliGRAM(s)/deciliter  ondansetron Injectable 4 milliGRAM(s) IV Push every 8 hours PRN Nausea and/or Vomiting      CAPILLARY BLOOD GLUCOSE      POCT Blood Glucose.: 244 mg/dL (10 Apr 2023 22:12)  POCT Blood Glucose.: 202 mg/dL (10 Apr 2023 17:24)  POCT Blood Glucose.: 218 mg/dL (10 Apr 2023 12:03)  POCT Blood Glucose.: 109 mg/dL (10 Apr 2023 08:50)  POCT Blood Glucose.: 67 mg/dL (10 Apr 2023 08:29)  POCT Blood Glucose.: 63 mg/dL (10 Apr 2023 08:28)    I&O's Summary      Vital Signs Last 24 Hrs  T(C): 36.6 (11 Apr 2023 06:20), Max: 36.9 (10 Apr 2023 14:17)  T(F): 97.9 (11 Apr 2023 06:20), Max: 98.4 (10 Apr 2023 14:17)  HR: 79 (11 Apr 2023 06:20) (68 - 91)  BP: 119/64 (11 Apr 2023 06:20) (119/64 - 129/65)  BP(mean): --  RR: 17 (11 Apr 2023 06:20) (17 - 18)  SpO2: 98% (11 Apr 2023 06:20) (95% - 100%)    Parameters below as of 11 Apr 2023 06:20  Patient On (Oxygen Delivery Method): nasal cannula  O2 Flow (L/min): 3      PHYSICAL EXAM:  GENERAL: NAD, well-developed  HEAD: Atraumatic, Normocephalic  EYES: EOMI, PERRLA, conjunctiva and sclera clear  NECK: Supple, No JVD  CHEST/LUNG: Clear to auscultation bilaterally; No wheezes or crackles  HEART: Normal S1/S2; Regular rate and rhythm; No murmurs, rubs, or gallops  ABDOMEN: Soft, Nontender, Nondistended; Bowel sounds present  EXTREMITIES: 2+ Peripheral Pulses; No clubbing, cyanosis, or edema  PSYCH: A&Ox3  NEUROLOGY: no focal neurologic deficit  SKIN: No rashes or lesions    LABS:                        9.3    10.47 )-----------( 161      ( 10 Apr 2023 06:00 )             30.6      04-10    139  |  98  |  16  ----------------------------<  94  4.1   |  36<H>  |  0.33<L>    Ca    8.8      10 Apr 2023 06:00  Phos  2.8     04-10  Mg     2.20     04-10                RADIOLOGY & ADDITIONAL TESTS:    Imaging Personally Reviewed:    Consultant(s) Notes Reviewed:      Care Discussed with Consultants/Other Providers:

## 2023-04-11 NOTE — PROGRESS NOTE ADULT - ATTENDING COMMENTS
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  Pending AVAPS approval for dispo home.  Patient is stable for DC with AVAPS at home.

## 2023-04-11 NOTE — PROGRESS NOTE ADULT - PROVIDER SPECIALTY LIST ADULT
Pulmonology
Pulmonology
Internal Medicine
Pulmonology
Internal Medicine

## 2023-04-11 NOTE — PROGRESS NOTE ADULT - ATTENDING SUPERVISION STATEMENT
Fellow
Resident

## 2023-04-27 NOTE — ED ADULT TRIAGE NOTE - CCCP TRG CHIEF CMPLNT
dominique eye irritation DISPLAY PLAN FREE TEXT DISPLAY PLAN FREE TEXT DISPLAY PLAN FREE TEXT DISPLAY PLAN FREE TEXT DISPLAY PLAN FREE TEXT DISPLAY PLAN FREE TEXT DISPLAY PLAN FREE TEXT

## 2023-05-03 ENCOUNTER — TRANSCRIPTION ENCOUNTER (OUTPATIENT)
Age: 68
End: 2023-05-03

## 2023-05-09 ENCOUNTER — RX RENEWAL (OUTPATIENT)
Age: 68
End: 2023-05-09

## 2023-05-09 RX ORDER — MIRTAZAPINE 7.5 MG/1
7.5 TABLET, FILM COATED ORAL
Qty: 30 | Refills: 5 | Status: ACTIVE | COMMUNITY
Start: 2022-02-02 | End: 1900-01-01

## 2023-05-17 ENCOUNTER — INPATIENT (INPATIENT)
Facility: HOSPITAL | Age: 68
LOS: 11 days | Discharge: HOME | End: 2023-05-29
Attending: HOSPITALIST | Admitting: HOSPITALIST
Payer: MEDICAID

## 2023-05-17 ENCOUNTER — APPOINTMENT (OUTPATIENT)
Dept: PULMONOLOGY | Facility: CLINIC | Age: 68
End: 2023-05-17
Payer: MEDICAID

## 2023-05-17 VITALS
RESPIRATION RATE: 24 BRPM | TEMPERATURE: 98 F | OXYGEN SATURATION: 100 % | DIASTOLIC BLOOD PRESSURE: 78 MMHG | HEART RATE: 66 BPM | SYSTOLIC BLOOD PRESSURE: 152 MMHG | HEIGHT: 62 IN

## 2023-05-17 DIAGNOSIS — J47.1 BRONCHIECTASIS WITH (ACUTE) EXACERBATION: ICD-10-CM

## 2023-05-17 DIAGNOSIS — Z98.890 OTHER SPECIFIED POSTPROCEDURAL STATES: Chronic | ICD-10-CM

## 2023-05-17 DIAGNOSIS — J32.9 CHRONIC SINUSITIS, UNSPECIFIED: ICD-10-CM

## 2023-05-17 DIAGNOSIS — E46 UNSPECIFIED PROTEIN-CALORIE MALNUTRITION: ICD-10-CM

## 2023-05-17 DIAGNOSIS — Z90.49 ACQUIRED ABSENCE OF OTHER SPECIFIED PARTS OF DIGESTIVE TRACT: Chronic | ICD-10-CM

## 2023-05-17 LAB
ALBUMIN SERPL ELPH-MCNC: 3.5 G/DL — SIGNIFICANT CHANGE UP (ref 3.3–5)
ALP SERPL-CCNC: 185 U/L — HIGH (ref 40–120)
ALT FLD-CCNC: 15 U/L — SIGNIFICANT CHANGE UP (ref 4–33)
ANION GAP SERPL CALC-SCNC: 9 MMOL/L — SIGNIFICANT CHANGE UP (ref 7–14)
AST SERPL-CCNC: 32 U/L — SIGNIFICANT CHANGE UP (ref 4–32)
BASE EXCESS BLDV CALC-SCNC: 9.8 MMOL/L — HIGH (ref -2–3)
BASOPHILS # BLD AUTO: 0.05 K/UL — SIGNIFICANT CHANGE UP (ref 0–0.2)
BASOPHILS NFR BLD AUTO: 0.5 % — SIGNIFICANT CHANGE UP (ref 0–2)
BILIRUB SERPL-MCNC: 0.3 MG/DL — SIGNIFICANT CHANGE UP (ref 0.2–1.2)
BLOOD GAS VENOUS COMPREHENSIVE RESULT: SIGNIFICANT CHANGE UP
BUN SERPL-MCNC: 10 MG/DL — SIGNIFICANT CHANGE UP (ref 7–23)
CALCIUM SERPL-MCNC: 9.3 MG/DL — SIGNIFICANT CHANGE UP (ref 8.4–10.5)
CHLORIDE BLDV-SCNC: 80 MMOL/L — LOW (ref 96–108)
CHLORIDE SERPL-SCNC: 78 MMOL/L — LOW (ref 98–107)
CO2 BLDV-SCNC: 45.1 MMOL/L — HIGH (ref 22–26)
CO2 SERPL-SCNC: 31 MMOL/L — SIGNIFICANT CHANGE UP (ref 22–31)
CREAT SERPL-MCNC: 0.27 MG/DL — LOW (ref 0.5–1.3)
EGFR: 120 ML/MIN/1.73M2 — SIGNIFICANT CHANGE UP
EOSINOPHIL # BLD AUTO: 0.4 K/UL — SIGNIFICANT CHANGE UP (ref 0–0.5)
EOSINOPHIL NFR BLD AUTO: 4 % — SIGNIFICANT CHANGE UP (ref 0–6)
GAS PNL BLDV: 113 MMOL/L — CRITICAL LOW (ref 136–145)
GLUCOSE BLDV-MCNC: 228 MG/DL — HIGH (ref 70–99)
GLUCOSE SERPL-MCNC: 226 MG/DL — HIGH (ref 70–99)
HCO3 BLDV-SCNC: 42 MMOL/L — HIGH (ref 22–29)
HCT VFR BLD CALC: 36.5 % — SIGNIFICANT CHANGE UP (ref 34.5–45)
HCT VFR BLDA CALC: 37 % — SIGNIFICANT CHANGE UP (ref 34.5–46.5)
HGB BLD CALC-MCNC: 12.4 G/DL — SIGNIFICANT CHANGE UP (ref 11.7–16.1)
HGB BLD-MCNC: 11.9 G/DL — SIGNIFICANT CHANGE UP (ref 11.5–15.5)
IANC: 7.22 K/UL — SIGNIFICANT CHANGE UP (ref 1.8–7.4)
IMM GRANULOCYTES NFR BLD AUTO: 0.5 % — SIGNIFICANT CHANGE UP (ref 0–0.9)
LACTATE BLDV-MCNC: 0.6 MMOL/L — SIGNIFICANT CHANGE UP (ref 0.5–2)
LYMPHOCYTES # BLD AUTO: 1.6 K/UL — SIGNIFICANT CHANGE UP (ref 1–3.3)
LYMPHOCYTES # BLD AUTO: 15.8 % — SIGNIFICANT CHANGE UP (ref 13–44)
MAGNESIUM SERPL-MCNC: 1.7 MG/DL — SIGNIFICANT CHANGE UP (ref 1.6–2.6)
MCHC RBC-ENTMCNC: 26.4 PG — LOW (ref 27–34)
MCHC RBC-ENTMCNC: 32.6 GM/DL — SIGNIFICANT CHANGE UP (ref 32–36)
MCV RBC AUTO: 81.1 FL — SIGNIFICANT CHANGE UP (ref 80–100)
MONOCYTES # BLD AUTO: 0.79 K/UL — SIGNIFICANT CHANGE UP (ref 0–0.9)
MONOCYTES NFR BLD AUTO: 7.8 % — SIGNIFICANT CHANGE UP (ref 2–14)
NEUTROPHILS # BLD AUTO: 7.22 K/UL — SIGNIFICANT CHANGE UP (ref 1.8–7.4)
NEUTROPHILS NFR BLD AUTO: 71.4 % — SIGNIFICANT CHANGE UP (ref 43–77)
NRBC # BLD: 0 /100 WBCS — SIGNIFICANT CHANGE UP (ref 0–0)
NRBC # FLD: 0 K/UL — SIGNIFICANT CHANGE UP (ref 0–0)
PCO2 BLDV: 107 MMHG — HIGH (ref 39–52)
PH BLDV: 7.2 — LOW (ref 7.32–7.43)
PHOSPHATE SERPL-MCNC: 3.6 MG/DL — SIGNIFICANT CHANGE UP (ref 2.5–4.5)
PLATELET # BLD AUTO: 133 K/UL — LOW (ref 150–400)
PO2 BLDV: 60 MMHG — HIGH (ref 25–45)
POTASSIUM BLDV-SCNC: 5.7 MMOL/L — HIGH (ref 3.5–5.1)
POTASSIUM SERPL-MCNC: 5.7 MMOL/L — HIGH (ref 3.5–5.3)
POTASSIUM SERPL-SCNC: 5.7 MMOL/L — HIGH (ref 3.5–5.3)
PROT SERPL-MCNC: 7 G/DL — SIGNIFICANT CHANGE UP (ref 6–8.3)
RBC # BLD: 4.5 M/UL — SIGNIFICANT CHANGE UP (ref 3.8–5.2)
RBC # FLD: 12.6 % — SIGNIFICANT CHANGE UP (ref 10.3–14.5)
SAO2 % BLDV: 88.2 % — HIGH (ref 67–88)
SODIUM SERPL-SCNC: 118 MMOL/L — CRITICAL LOW (ref 135–145)
WBC # BLD: 10.11 K/UL — SIGNIFICANT CHANGE UP (ref 3.8–10.5)
WBC # FLD AUTO: 10.11 K/UL — SIGNIFICANT CHANGE UP (ref 3.8–10.5)

## 2023-05-17 PROCEDURE — 99285 EMERGENCY DEPT VISIT HI MDM: CPT

## 2023-05-17 PROCEDURE — 99214 OFFICE O/P EST MOD 30 MIN: CPT | Mod: 95

## 2023-05-17 NOTE — ED PROVIDER NOTE - PHYSICAL EXAMINATION
GENERAL: NAD, lying in bed comfortably  HEAD:  Atraumatic, normocephalic  EYES: EOMI, PERRLA, conjunctiva and sclera clear  ENT: Moist mucous membranes  NECK: Supple, no JVD  HEART: Regular rate and rhythm, no murmurs, rubs, or gallops  LUNGS: Coarse lung sounds, rubs appreciated bilaterally in all lung fields.   ABDOMEN: Soft, nontender, nondistended, +BS  EXTREMITIES: 2+ peripheral pulses bilaterally. No clubbing, cyanosis, +edema 2+ of feet bilaterally  NERVOUS SYSTEM:  A&Ox0-2, responsive to verbal stimuli. no focal deficits   SKIN: No rashes or lesions

## 2023-05-17 NOTE — ED ADULT NURSE REASSESSMENT NOTE - NS ED NURSE REASSESS COMMENT FT1
Break RN: Pt is A&Ox2 to self and place. Pt is lethargic but responsive to verbal/physical stimuli. Respirations even and unlabored, O2 sat 100% on 4L NC. Chest rise equal b/l, NSR on cardiac monitor. Rectal temp 95.6. Dr. Berry made aware. 20g IVL placed in left hand and right hand. Labs collected and sent. Pt placed on bear hugger. Straight cath performed and pt tolerated well. 500cc of urine in drainage bag. Safety maintained throughout. Report given to primary RN Racheal.

## 2023-05-17 NOTE — ED ADULT NURSE REASSESSMENT NOTE - NSFALLRISKINTERV_ED_ALL_ED
Assistance OOB with selected safe patient handling equipment if applicable/Assistance with ambulation/Communicate fall risk and risk factors to all staff, patient, and family/Monitor gait and stability/Monitor for mental status changes and reorient to person, place, and time, as needed/Provide visual cue: yellow wristband, yellow gown, etc/Reinforce activity limits and safety measures with patient and family/Toileting schedule using arm’s reach rule for commode and bathroom/Use of alarms - bed, stretcher, chair and/or video monitoring/Call bell, personal items and telephone in reach/Instruct patient to call for assistance before getting out of bed/chair/stretcher/Non-slip footwear applied when patient is off stretcher/[place] to call system/Physically safe environment - no spills, clutter or unnecessary equipment/Purposeful Proactive Rounding/Room/bathroom lighting operational, light cord in reach

## 2023-05-17 NOTE — ED PROVIDER NOTE - CLINICAL SUMMARY MEDICAL DECISION MAKING FREE TEXT BOX
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 Found to be hypothermic to 95.6 rectally. Ordered CBC/CMP/blood gas, trop/EKG/CXR, will get infectious workup with UCx/BCx. 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 Found to be hypothermic to 95.6 rectally. Ordered CBC/CMP/blood gas, trop/EKG/CXR, will get infectious workup with UCx/BCx.

## 2023-05-17 NOTE — ED ADULT NURSE NOTE - OBJECTIVE STATEMENT
67 y/o F presents to ed ROOM 17 a&oX1, (baseline A&Ox3) information obtained from daughter at bedside. pt lauren speaking. as per daughters, AMS x3-4 days. denies fevers at home. endorses wet cough, weakness, chills and AMS. respirations even and unlabored. cough noted. on 3.5L NC at home, PMHx COPD, DM, HTN. 100% on NC at this time. placed on continuos monitor. denies c/p, N/V/D. awaiting orders. safety maintained, side rails up 69 y/o F presents to ed ROOM 17 a&oX1, (baseline A&Ox3) information obtained from daughter at bedside. pt lauren speaking. as per daughters, AMS x3-4 days. denies fevers at home. endorses wet cough, weakness, chills and AMS. respirations even and unlabored. cough noted. on 3.5L NC at home, PMHx COPD, DM, HTN. 100% on NC at this time. placed on continuos monitor. stage 1 pressure ulcer noted to sacrum. denies c/p, N/V/D. awaiting orders. safety maintained, side rails up

## 2023-05-17 NOTE — ED PROVIDER NOTE - PROGRESS NOTE DETAILS
patient tolerated Bipap well, was converted to AVAPS. Repeat gas improved with PCO2 in 70's, patient more awake and alert now. UA positive for bacteria, UCx/Bcx collected. Will repeat BMP to reassess sodium though patient with low Na on previous admission. Will give dose ceftriaxone for supposed UTI, nebs.

## 2023-05-17 NOTE — ED ADULT TRIAGE NOTE - CHIEF COMPLAINT QUOTE
Patient brought in by daughter-in-law c/o altered mental status x 3 days. Sent by MD with concern for increased CO2. Patient AAOx1, on home 4L NC for bronchiectasis, wet cough noted. Denies chest pain, shortness of breath, fever, nausea, vomiting. H/x HTN, bronchiectasis, DM2, anxiety

## 2023-05-18 DIAGNOSIS — Z91.89 OTHER SPECIFIED PERSONAL RISK FACTORS, NOT ELSEWHERE CLASSIFIED: ICD-10-CM

## 2023-05-18 DIAGNOSIS — K59.09 OTHER CONSTIPATION: ICD-10-CM

## 2023-05-18 DIAGNOSIS — E87.1 HYPO-OSMOLALITY AND HYPONATREMIA: ICD-10-CM

## 2023-05-18 DIAGNOSIS — A41.9 SEPSIS, UNSPECIFIED ORGANISM: ICD-10-CM

## 2023-05-18 DIAGNOSIS — J96.02 ACUTE RESPIRATORY FAILURE WITH HYPERCAPNIA: ICD-10-CM

## 2023-05-18 DIAGNOSIS — I10 ESSENTIAL (PRIMARY) HYPERTENSION: ICD-10-CM

## 2023-05-18 DIAGNOSIS — E87.5 HYPERKALEMIA: ICD-10-CM

## 2023-05-18 DIAGNOSIS — R41.82 ALTERED MENTAL STATUS, UNSPECIFIED: ICD-10-CM

## 2023-05-18 DIAGNOSIS — G93.41 METABOLIC ENCEPHALOPATHY: ICD-10-CM

## 2023-05-18 DIAGNOSIS — Z29.9 ENCOUNTER FOR PROPHYLACTIC MEASURES, UNSPECIFIED: ICD-10-CM

## 2023-05-18 PROBLEM — J47.1 BRONCHIECTASIS WITH ACUTE EXACERBATION: Status: ACTIVE | Noted: 2017-11-02

## 2023-05-18 PROBLEM — J32.9 CHRONIC RECURRENT SINUSITIS: Status: ACTIVE | Noted: 2019-03-21

## 2023-05-18 PROBLEM — E46 MALNUTRITION, CALORIE: Status: ACTIVE | Noted: 2021-05-06

## 2023-05-18 LAB
ANION GAP SERPL CALC-SCNC: 6 MMOL/L — LOW (ref 7–14)
ANION GAP SERPL CALC-SCNC: 6 MMOL/L — LOW (ref 7–14)
ANION GAP SERPL CALC-SCNC: 8 MMOL/L — SIGNIFICANT CHANGE UP (ref 7–14)
APPEARANCE UR: ABNORMAL
APPEARANCE UR: ABNORMAL
B PERT DNA SPEC QL NAA+PROBE: SIGNIFICANT CHANGE UP
B PERT+PARAPERT DNA PNL SPEC NAA+PROBE: SIGNIFICANT CHANGE UP
BACTERIA # UR AUTO: ABNORMAL
BACTERIA # UR AUTO: ABNORMAL
BASE EXCESS BLDV CALC-SCNC: 9.3 MMOL/L — HIGH (ref -2–3)
BILIRUB UR-MCNC: NEGATIVE — SIGNIFICANT CHANGE UP
BILIRUB UR-MCNC: NEGATIVE — SIGNIFICANT CHANGE UP
BLOOD GAS VENOUS COMPREHENSIVE RESULT: SIGNIFICANT CHANGE UP
BORDETELLA PARAPERTUSSIS (RAPRVP): SIGNIFICANT CHANGE UP
BUN SERPL-MCNC: 10 MG/DL — SIGNIFICANT CHANGE UP (ref 7–23)
BUN SERPL-MCNC: 13 MG/DL — SIGNIFICANT CHANGE UP (ref 7–23)
BUN SERPL-MCNC: 9 MG/DL — SIGNIFICANT CHANGE UP (ref 7–23)
C PNEUM DNA SPEC QL NAA+PROBE: SIGNIFICANT CHANGE UP
CALCIUM SERPL-MCNC: 9 MG/DL — SIGNIFICANT CHANGE UP (ref 8.4–10.5)
CALCIUM SERPL-MCNC: 9.2 MG/DL — SIGNIFICANT CHANGE UP (ref 8.4–10.5)
CALCIUM SERPL-MCNC: 9.3 MG/DL — SIGNIFICANT CHANGE UP (ref 8.4–10.5)
CHLORIDE BLDV-SCNC: 82 MMOL/L — LOW (ref 96–108)
CHLORIDE SERPL-SCNC: 77 MMOL/L — LOW (ref 98–107)
CHLORIDE SERPL-SCNC: 78 MMOL/L — LOW (ref 98–107)
CHLORIDE SERPL-SCNC: 79 MMOL/L — LOW (ref 98–107)
CO2 BLDV-SCNC: 40.1 MMOL/L — HIGH (ref 22–26)
CO2 SERPL-SCNC: 32 MMOL/L — HIGH (ref 22–31)
CO2 SERPL-SCNC: 32 MMOL/L — HIGH (ref 22–31)
CO2 SERPL-SCNC: 33 MMOL/L — HIGH (ref 22–31)
COLOR SPEC: YELLOW — SIGNIFICANT CHANGE UP
COLOR SPEC: YELLOW — SIGNIFICANT CHANGE UP
CREAT ?TM UR-MCNC: 68 MG/DL — SIGNIFICANT CHANGE UP
CREAT SERPL-MCNC: 0.27 MG/DL — LOW (ref 0.5–1.3)
CREAT SERPL-MCNC: 0.29 MG/DL — LOW (ref 0.5–1.3)
CREAT SERPL-MCNC: 0.35 MG/DL — LOW (ref 0.5–1.3)
DIFF PNL FLD: ABNORMAL
DIFF PNL FLD: NEGATIVE — SIGNIFICANT CHANGE UP
EGFR: 113 ML/MIN/1.73M2 — SIGNIFICANT CHANGE UP
EGFR: 118 ML/MIN/1.73M2 — SIGNIFICANT CHANGE UP
EGFR: 120 ML/MIN/1.73M2 — SIGNIFICANT CHANGE UP
EPI CELLS # UR: 0 /HPF — SIGNIFICANT CHANGE UP (ref 0–5)
EPI CELLS # UR: 1 /HPF — SIGNIFICANT CHANGE UP (ref 0–5)
FLUAV SUBTYP SPEC NAA+PROBE: SIGNIFICANT CHANGE UP
FLUBV RNA SPEC QL NAA+PROBE: SIGNIFICANT CHANGE UP
GAS PNL BLDV: 113 MMOL/L — CRITICAL LOW (ref 136–145)
GLUCOSE BLDC GLUCOMTR-MCNC: 137 MG/DL — HIGH (ref 70–99)
GLUCOSE BLDC GLUCOMTR-MCNC: 149 MG/DL — HIGH (ref 70–99)
GLUCOSE BLDC GLUCOMTR-MCNC: 231 MG/DL — HIGH (ref 70–99)
GLUCOSE BLDC GLUCOMTR-MCNC: 231 MG/DL — HIGH (ref 70–99)
GLUCOSE BLDC GLUCOMTR-MCNC: 244 MG/DL — HIGH (ref 70–99)
GLUCOSE BLDV-MCNC: 286 MG/DL — HIGH (ref 70–99)
GLUCOSE SERPL-MCNC: 161 MG/DL — HIGH (ref 70–99)
GLUCOSE SERPL-MCNC: 244 MG/DL — HIGH (ref 70–99)
GLUCOSE SERPL-MCNC: 245 MG/DL — HIGH (ref 70–99)
GLUCOSE UR QL: ABNORMAL
GLUCOSE UR QL: ABNORMAL
HADV DNA SPEC QL NAA+PROBE: SIGNIFICANT CHANGE UP
HCO3 BLDV-SCNC: 38 MMOL/L — HIGH (ref 22–29)
HCOV 229E RNA SPEC QL NAA+PROBE: SIGNIFICANT CHANGE UP
HCOV HKU1 RNA SPEC QL NAA+PROBE: SIGNIFICANT CHANGE UP
HCOV NL63 RNA SPEC QL NAA+PROBE: SIGNIFICANT CHANGE UP
HCOV OC43 RNA SPEC QL NAA+PROBE: SIGNIFICANT CHANGE UP
HCT VFR BLDA CALC: 32 % — LOW (ref 34.5–46.5)
HGB BLD CALC-MCNC: 10.5 G/DL — LOW (ref 11.7–16.1)
HMPV RNA SPEC QL NAA+PROBE: SIGNIFICANT CHANGE UP
HPIV1 RNA SPEC QL NAA+PROBE: SIGNIFICANT CHANGE UP
HPIV2 RNA SPEC QL NAA+PROBE: SIGNIFICANT CHANGE UP
HPIV3 RNA SPEC QL NAA+PROBE: SIGNIFICANT CHANGE UP
HPIV4 RNA SPEC QL NAA+PROBE: SIGNIFICANT CHANGE UP
HYALINE CASTS # UR AUTO: 1 /LPF — SIGNIFICANT CHANGE UP (ref 0–7)
KETONES UR-MCNC: ABNORMAL
KETONES UR-MCNC: NEGATIVE — SIGNIFICANT CHANGE UP
LACTATE BLDV-MCNC: 1 MMOL/L — SIGNIFICANT CHANGE UP (ref 0.5–2)
LEUKOCYTE ESTERASE UR-ACNC: NEGATIVE — SIGNIFICANT CHANGE UP
LEUKOCYTE ESTERASE UR-ACNC: NEGATIVE — SIGNIFICANT CHANGE UP
M PNEUMO DNA SPEC QL NAA+PROBE: SIGNIFICANT CHANGE UP
MAGNESIUM SERPL-MCNC: 1.5 MG/DL — LOW (ref 1.6–2.6)
MAGNESIUM SERPL-MCNC: 1.6 MG/DL — SIGNIFICANT CHANGE UP (ref 1.6–2.6)
NITRITE UR-MCNC: NEGATIVE — SIGNIFICANT CHANGE UP
NITRITE UR-MCNC: NEGATIVE — SIGNIFICANT CHANGE UP
NT-PROBNP SERPL-SCNC: 902 PG/ML — HIGH
OSMOLALITY UR: 398 MOSM/KG — SIGNIFICANT CHANGE UP (ref 50–1200)
PCO2 BLDV: 75 MMHG — HIGH (ref 39–52)
PH BLDV: 7.31 — LOW (ref 7.32–7.43)
PH UR: 6 — SIGNIFICANT CHANGE UP (ref 5–8)
PH UR: 7 — SIGNIFICANT CHANGE UP (ref 5–8)
PHOSPHATE SERPL-MCNC: 2.6 MG/DL — SIGNIFICANT CHANGE UP (ref 2.5–4.5)
PHOSPHATE SERPL-MCNC: 3.3 MG/DL — SIGNIFICANT CHANGE UP (ref 2.5–4.5)
PO2 BLDV: 132 MMHG — HIGH (ref 25–45)
POTASSIUM BLDV-SCNC: 4.5 MMOL/L — SIGNIFICANT CHANGE UP (ref 3.5–5.1)
POTASSIUM SERPL-MCNC: 4.6 MMOL/L — SIGNIFICANT CHANGE UP (ref 3.5–5.3)
POTASSIUM SERPL-MCNC: 4.7 MMOL/L — SIGNIFICANT CHANGE UP (ref 3.5–5.3)
POTASSIUM SERPL-MCNC: 4.8 MMOL/L — SIGNIFICANT CHANGE UP (ref 3.5–5.3)
POTASSIUM SERPL-SCNC: 4.6 MMOL/L — SIGNIFICANT CHANGE UP (ref 3.5–5.3)
POTASSIUM SERPL-SCNC: 4.7 MMOL/L — SIGNIFICANT CHANGE UP (ref 3.5–5.3)
POTASSIUM SERPL-SCNC: 4.8 MMOL/L — SIGNIFICANT CHANGE UP (ref 3.5–5.3)
POTASSIUM UR-SCNC: 39 MMOL/L — SIGNIFICANT CHANGE UP
PROCALCITONIN SERPL-MCNC: 0.05 NG/ML — SIGNIFICANT CHANGE UP (ref 0.02–0.1)
PROT ?TM UR-MCNC: 65 MG/DL — SIGNIFICANT CHANGE UP
PROT UR-MCNC: ABNORMAL
PROT UR-MCNC: ABNORMAL
PROT/CREAT UR-RTO: 1 RATIO — HIGH (ref 0–0.2)
RAPID RVP RESULT: SIGNIFICANT CHANGE UP
RBC CASTS # UR COMP ASSIST: 13 /HPF — HIGH (ref 0–4)
RBC CASTS # UR COMP ASSIST: 7 /HPF — HIGH (ref 0–4)
RSV RNA SPEC QL NAA+PROBE: SIGNIFICANT CHANGE UP
RV+EV RNA SPEC QL NAA+PROBE: SIGNIFICANT CHANGE UP
SAO2 % BLDV: 98.5 % — HIGH (ref 67–88)
SARS-COV-2 RNA SPEC QL NAA+PROBE: SIGNIFICANT CHANGE UP
SODIUM SERPL-SCNC: 116 MMOL/L — CRITICAL LOW (ref 135–145)
SODIUM SERPL-SCNC: 117 MMOL/L — CRITICAL LOW (ref 135–145)
SODIUM SERPL-SCNC: 118 MMOL/L — CRITICAL LOW (ref 135–145)
SODIUM UR-SCNC: <20 MMOL/L — SIGNIFICANT CHANGE UP
SP GR SPEC: 1.02 — SIGNIFICANT CHANGE UP (ref 1.01–1.05)
SP GR SPEC: 1.02 — SIGNIFICANT CHANGE UP (ref 1.01–1.05)
T3 SERPL-MCNC: 85 NG/DL — SIGNIFICANT CHANGE UP (ref 80–200)
T4 FREE SERPL-MCNC: 1.4 NG/DL — SIGNIFICANT CHANGE UP (ref 0.9–1.8)
TSH SERPL-MCNC: 5.31 UIU/ML — HIGH (ref 0.27–4.2)
URATE SERPL-MCNC: 2.3 MG/DL — LOW (ref 2.5–7)
UROBILINOGEN FLD QL: SIGNIFICANT CHANGE UP
UROBILINOGEN FLD QL: SIGNIFICANT CHANGE UP
UUN UR-MCNC: 511.3 MG/DL — SIGNIFICANT CHANGE UP
WBC UR QL: 2 /HPF — SIGNIFICANT CHANGE UP (ref 0–5)
WBC UR QL: 4 /HPF — SIGNIFICANT CHANGE UP (ref 0–5)

## 2023-05-18 PROCEDURE — 99291 CRITICAL CARE FIRST HOUR: CPT | Mod: GC

## 2023-05-18 PROCEDURE — 99223 1ST HOSP IP/OBS HIGH 75: CPT | Mod: GC

## 2023-05-18 PROCEDURE — 71046 X-RAY EXAM CHEST 2 VIEWS: CPT | Mod: 26

## 2023-05-18 PROCEDURE — 93306 TTE W/DOPPLER COMPLETE: CPT | Mod: 26

## 2023-05-18 PROCEDURE — 93970 EXTREMITY STUDY: CPT | Mod: 26

## 2023-05-18 RX ORDER — VANCOMYCIN HCL 1 G
750 VIAL (EA) INTRAVENOUS EVERY 12 HOURS
Refills: 0 | Status: DISCONTINUED | OUTPATIENT
Start: 2023-05-18 | End: 2023-05-19

## 2023-05-18 RX ORDER — INSULIN LISPRO 100/ML
2 VIAL (ML) SUBCUTANEOUS
Refills: 0 | Status: DISCONTINUED | OUTPATIENT
Start: 2023-05-18 | End: 2023-05-20

## 2023-05-18 RX ORDER — ENOXAPARIN SODIUM 100 MG/ML
30 INJECTION SUBCUTANEOUS EVERY 24 HOURS
Refills: 0 | Status: DISCONTINUED | OUTPATIENT
Start: 2023-05-18 | End: 2023-05-29

## 2023-05-18 RX ORDER — NYSTATIN 500MM UNIT
500000 POWDER (EA) MISCELLANEOUS
Refills: 0 | Status: DISCONTINUED | OUTPATIENT
Start: 2023-05-18 | End: 2023-05-29

## 2023-05-18 RX ORDER — CEFTRIAXONE 500 MG/1
1000 INJECTION, POWDER, FOR SOLUTION INTRAMUSCULAR; INTRAVENOUS ONCE
Refills: 0 | Status: COMPLETED | OUTPATIENT
Start: 2023-05-18 | End: 2023-05-18

## 2023-05-18 RX ORDER — CEFEPIME 1 G/1
2000 INJECTION, POWDER, FOR SOLUTION INTRAMUSCULAR; INTRAVENOUS EVERY 8 HOURS
Refills: 0 | Status: DISCONTINUED | OUTPATIENT
Start: 2023-05-18 | End: 2023-05-19

## 2023-05-18 RX ORDER — SODIUM CHLORIDE 9 MG/ML
4 INJECTION INTRAMUSCULAR; INTRAVENOUS; SUBCUTANEOUS EVERY 12 HOURS
Refills: 0 | Status: DISCONTINUED | OUTPATIENT
Start: 2023-05-18 | End: 2023-05-29

## 2023-05-18 RX ORDER — BUDESONIDE AND FORMOTEROL FUMARATE DIHYDRATE 160; 4.5 UG/1; UG/1
2 AEROSOL RESPIRATORY (INHALATION)
Refills: 0 | Status: DISCONTINUED | OUTPATIENT
Start: 2023-05-18 | End: 2023-05-29

## 2023-05-18 RX ORDER — INSULIN LISPRO 100/ML
VIAL (ML) SUBCUTANEOUS
Refills: 0 | Status: DISCONTINUED | OUTPATIENT
Start: 2023-05-18 | End: 2023-05-20

## 2023-05-18 RX ORDER — INSULIN HUMAN 100 [IU]/ML
10 INJECTION, SOLUTION SUBCUTANEOUS ONCE
Refills: 0 | Status: COMPLETED | OUTPATIENT
Start: 2023-05-18 | End: 2023-05-18

## 2023-05-18 RX ORDER — MAGNESIUM SULFATE 500 MG/ML
2 VIAL (ML) INJECTION ONCE
Refills: 0 | Status: COMPLETED | OUTPATIENT
Start: 2023-05-18 | End: 2023-05-18

## 2023-05-18 RX ORDER — CEFEPIME 1 G/1
2000 INJECTION, POWDER, FOR SOLUTION INTRAMUSCULAR; INTRAVENOUS ONCE
Refills: 0 | Status: COMPLETED | OUTPATIENT
Start: 2023-05-18 | End: 2023-05-18

## 2023-05-18 RX ORDER — IPRATROPIUM/ALBUTEROL SULFATE 18-103MCG
3 AEROSOL WITH ADAPTER (GRAM) INHALATION EVERY 6 HOURS
Refills: 0 | Status: DISCONTINUED | OUTPATIENT
Start: 2023-05-18 | End: 2023-05-29

## 2023-05-18 RX ORDER — PANTOPRAZOLE SODIUM 20 MG/1
40 TABLET, DELAYED RELEASE ORAL
Refills: 0 | Status: DISCONTINUED | OUTPATIENT
Start: 2023-05-18 | End: 2023-05-29

## 2023-05-18 RX ORDER — SODIUM CHLORIDE 9 MG/ML
1000 INJECTION, SOLUTION INTRAVENOUS
Refills: 0 | Status: DISCONTINUED | OUTPATIENT
Start: 2023-05-18 | End: 2023-05-20

## 2023-05-18 RX ORDER — ENOXAPARIN SODIUM 100 MG/ML
40 INJECTION SUBCUTANEOUS EVERY 24 HOURS
Refills: 0 | Status: DISCONTINUED | OUTPATIENT
Start: 2023-05-18 | End: 2023-05-18

## 2023-05-18 RX ORDER — INSULIN GLARGINE 100 [IU]/ML
4 INJECTION, SOLUTION SUBCUTANEOUS EVERY MORNING
Refills: 0 | Status: DISCONTINUED | OUTPATIENT
Start: 2023-05-18 | End: 2023-05-20

## 2023-05-18 RX ORDER — ACETYLCYSTEINE 200 MG/ML
4 VIAL (ML) MISCELLANEOUS DAILY
Refills: 0 | Status: DISCONTINUED | OUTPATIENT
Start: 2023-05-18 | End: 2023-05-29

## 2023-05-18 RX ORDER — INSULIN LISPRO 100/ML
VIAL (ML) SUBCUTANEOUS AT BEDTIME
Refills: 0 | Status: DISCONTINUED | OUTPATIENT
Start: 2023-05-18 | End: 2023-05-20

## 2023-05-18 RX ORDER — DEXTROSE 50 % IN WATER 50 %
15 SYRINGE (ML) INTRAVENOUS ONCE
Refills: 0 | Status: DISCONTINUED | OUTPATIENT
Start: 2023-05-18 | End: 2023-05-20

## 2023-05-18 RX ORDER — SIMETHICONE 80 MG/1
80 TABLET, CHEWABLE ORAL DAILY
Refills: 0 | Status: DISCONTINUED | OUTPATIENT
Start: 2023-05-18 | End: 2023-05-29

## 2023-05-18 RX ORDER — GLUCAGON INJECTION, SOLUTION 0.5 MG/.1ML
1 INJECTION, SOLUTION SUBCUTANEOUS ONCE
Refills: 0 | Status: DISCONTINUED | OUTPATIENT
Start: 2023-05-18 | End: 2023-05-20

## 2023-05-18 RX ORDER — KETOTIFEN FUMARATE 0.34 MG/ML
1 SOLUTION OPHTHALMIC
Refills: 0 | Status: DISCONTINUED | OUTPATIENT
Start: 2023-05-18 | End: 2023-05-29

## 2023-05-18 RX ORDER — SENNA PLUS 8.6 MG/1
2 TABLET ORAL AT BEDTIME
Refills: 0 | Status: DISCONTINUED | OUTPATIENT
Start: 2023-05-18 | End: 2023-05-29

## 2023-05-18 RX ORDER — IPRATROPIUM/ALBUTEROL SULFATE 18-103MCG
3 AEROSOL WITH ADAPTER (GRAM) INHALATION ONCE
Refills: 0 | Status: DISCONTINUED | OUTPATIENT
Start: 2023-05-18 | End: 2023-05-21

## 2023-05-18 RX ORDER — SODIUM CHLORIDE 9 MG/ML
500 INJECTION INTRAMUSCULAR; INTRAVENOUS; SUBCUTANEOUS ONCE
Refills: 0 | Status: COMPLETED | OUTPATIENT
Start: 2023-05-18 | End: 2023-05-18

## 2023-05-18 RX ORDER — POLYETHYLENE GLYCOL 3350 17 G/17G
17 POWDER, FOR SOLUTION ORAL DAILY
Refills: 0 | Status: DISCONTINUED | OUTPATIENT
Start: 2023-05-18 | End: 2023-05-29

## 2023-05-18 RX ORDER — AMLODIPINE BESYLATE 2.5 MG/1
5 TABLET ORAL DAILY
Refills: 0 | Status: DISCONTINUED | OUTPATIENT
Start: 2023-05-18 | End: 2023-05-29

## 2023-05-18 RX ORDER — METOPROLOL TARTRATE 50 MG
50 TABLET ORAL DAILY
Refills: 0 | Status: DISCONTINUED | OUTPATIENT
Start: 2023-05-18 | End: 2023-05-29

## 2023-05-18 RX ORDER — DEXTROSE 50 % IN WATER 50 %
25 SYRINGE (ML) INTRAVENOUS ONCE
Refills: 0 | Status: DISCONTINUED | OUTPATIENT
Start: 2023-05-18 | End: 2023-05-20

## 2023-05-18 RX ORDER — DEXTROSE 50 % IN WATER 50 %
50 SYRINGE (ML) INTRAVENOUS ONCE
Refills: 0 | Status: COMPLETED | OUTPATIENT
Start: 2023-05-18 | End: 2023-05-18

## 2023-05-18 RX ORDER — MIRTAZAPINE 45 MG/1
7.5 TABLET, ORALLY DISINTEGRATING ORAL AT BEDTIME
Refills: 0 | Status: DISCONTINUED | OUTPATIENT
Start: 2023-05-18 | End: 2023-05-29

## 2023-05-18 RX ORDER — SODIUM CHLORIDE 9 MG/ML
1 INJECTION INTRAMUSCULAR; INTRAVENOUS; SUBCUTANEOUS THREE TIMES A DAY
Refills: 0 | Status: DISCONTINUED | OUTPATIENT
Start: 2023-05-18 | End: 2023-05-29

## 2023-05-18 RX ORDER — CEFEPIME 1 G/1
INJECTION, POWDER, FOR SOLUTION INTRAMUSCULAR; INTRAVENOUS
Refills: 0 | Status: DISCONTINUED | OUTPATIENT
Start: 2023-05-18 | End: 2023-05-19

## 2023-05-18 RX ORDER — FLUTICASONE PROPIONATE 50 MCG
1 SPRAY, SUSPENSION NASAL
Refills: 0 | Status: DISCONTINUED | OUTPATIENT
Start: 2023-05-18 | End: 2023-05-26

## 2023-05-18 RX ORDER — DEXTROSE 50 % IN WATER 50 %
12.5 SYRINGE (ML) INTRAVENOUS ONCE
Refills: 0 | Status: DISCONTINUED | OUTPATIENT
Start: 2023-05-18 | End: 2023-05-20

## 2023-05-18 RX ADMIN — Medication 250 MILLIGRAM(S): at 18:04

## 2023-05-18 RX ADMIN — POLYETHYLENE GLYCOL 3350 17 GRAM(S): 17 POWDER, FOR SOLUTION ORAL at 11:06

## 2023-05-18 RX ADMIN — BUDESONIDE AND FORMOTEROL FUMARATE DIHYDRATE 2 PUFF(S): 160; 4.5 AEROSOL RESPIRATORY (INHALATION) at 21:30

## 2023-05-18 RX ADMIN — Medication 50 MILLILITER(S): at 02:14

## 2023-05-18 RX ADMIN — Medication 3 MILLILITER(S): at 10:32

## 2023-05-18 RX ADMIN — Medication 2: at 13:22

## 2023-05-18 RX ADMIN — SENNA PLUS 2 TABLET(S): 8.6 TABLET ORAL at 21:27

## 2023-05-18 RX ADMIN — Medication 2: at 17:56

## 2023-05-18 RX ADMIN — SODIUM CHLORIDE 1 GRAM(S): 9 INJECTION INTRAMUSCULAR; INTRAVENOUS; SUBCUTANEOUS at 21:30

## 2023-05-18 RX ADMIN — CEFEPIME 100 MILLIGRAM(S): 1 INJECTION, POWDER, FOR SOLUTION INTRAMUSCULAR; INTRAVENOUS at 21:22

## 2023-05-18 RX ADMIN — Medication 3 MILLILITER(S): at 22:31

## 2023-05-18 RX ADMIN — Medication 2 UNIT(S): at 17:56

## 2023-05-18 RX ADMIN — INSULIN HUMAN 10 UNIT(S): 100 INJECTION, SOLUTION SUBCUTANEOUS at 02:14

## 2023-05-18 RX ADMIN — Medication 1 SPRAY(S): at 11:06

## 2023-05-18 RX ADMIN — Medication 3 MILLILITER(S): at 15:55

## 2023-05-18 RX ADMIN — PANTOPRAZOLE SODIUM 40 MILLIGRAM(S): 20 TABLET, DELAYED RELEASE ORAL at 11:06

## 2023-05-18 RX ADMIN — SODIUM CHLORIDE 250 MILLILITER(S): 9 INJECTION INTRAMUSCULAR; INTRAVENOUS; SUBCUTANEOUS at 21:26

## 2023-05-18 RX ADMIN — KETOTIFEN FUMARATE 1 DROP(S): 0.34 SOLUTION OPHTHALMIC at 17:57

## 2023-05-18 RX ADMIN — CEFEPIME 100 MILLIGRAM(S): 1 INJECTION, POWDER, FOR SOLUTION INTRAMUSCULAR; INTRAVENOUS at 16:53

## 2023-05-18 RX ADMIN — CEFTRIAXONE 100 MILLIGRAM(S): 500 INJECTION, POWDER, FOR SOLUTION INTRAMUSCULAR; INTRAVENOUS at 04:56

## 2023-05-18 RX ADMIN — SODIUM CHLORIDE 4 MILLILITER(S): 9 INJECTION INTRAMUSCULAR; INTRAVENOUS; SUBCUTANEOUS at 22:41

## 2023-05-18 NOTE — H&P ADULT - PROBLEM SELECTOR PLAN 6
Diet:  Psych/Sleep:  GI:  DVT ppx:  Code Status:  Dispo: Patient shifted in the ED with resolution Jamal Murillo Senna  Miralax  Dulcolax

## 2023-05-18 NOTE — CONSULT NOTE ADULT - SUBJECTIVE AND OBJECTIVE BOX
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 Per daughter in law, patient has been slowly getting more lethargic and confused for the past 3 days. Additionally patient had been adherent to AVAPs at night for 5-7hrs at a time. Otherwise no f/c, cough/congestion, sob, cp, abd pain.    Interval history: Patient seen at bedside and moves to verbal stimuli. Per daughter in law prior to 3 days ago the patient typically would know who she is and where she is, but appears confused at this time. I discussed goals of care with daughter in law, and daughter-in-law stated the patient had repeatedly stated she would not want to be intubated. The patient has 3 children and the family is aware that intubation is inconsistent with the patient's wishes. To the daughter in laws knowledge, she does not know if there is a HCP or a molst form.    PAST MEDICAL & SURGICAL HISTORY:  ABPA (allergic bronchopulmonary aspergillosis)  Bronchiectasis  Hypertension  Vitamin D deficiency  Microcytic anemia  GERD (gastroesophageal reflux disease)  Postnasal drip  Pseudomonas aeruginosa colonization  Allergic rhinitis  Recurrent pneumonia  Type 2 diabetes mellitus, with long-term current use of insulin  Anxiety and depression  Bronchiectasis with acute exacerbation  History of cholecystectomy  S/P dilatation of esophageal stricture      FAMILY HISTORY:  Family history of diabetes mellitus  father    Family history of allergic rhinitis (Child, Child)  son, daughter    Family history of bronchitis  mother, father        SOCIAL HISTORY:  Lives w/son no smoke/etoh hx    Allergies    No Known Allergies    Intolerances    HOME MEDICATIONS:  acetylcysteine 10% inhalation solution: 4 milliliter(s) inhaled 2 times a day  Admelog SoloStar 100 units/mL injectable solution: 2 unit(s) injectable 3 times a day (before meals) (depending on reading).  ALBUTEROL SULFATE (2.5 MG/3ML)0.083% NEBULIZER: USE 1 UNIT DOSE EVERY 4-6 HOURS AS NEEDED FOR WHEEZING .  AMLODIPINE BESYLATE 5MG TABLET: TAKE 1 TABLET (5 MG) BY MOUTH DAILY FOR HIGH BLOOD PRESSURE  azithromycin 500 mg oral tablet: 1 tab(s) orally Monday, Wednesday, and Friday  bisacodyl 5 mg oral delayed release tablet: 1 tab(s) orally once daily as needed  Calcium 600+D 600 mg-5 mcg (200 intl units) oral tablet: 1 tab(s) orally 2 times a day  CYANOCOBALAMIN 1000MCG/ML SOLUTION: ADMINISTER 1 MILLILITER (1,000 MCG) SUBCUTANEOUS EVERY 7 DAYS  FERROUS GLUCONATE 324 (38 FE)MG TABLET: TAKE 1 TABLET BY MOUTH 2 TIMES PER DAY BETWEEN MEALS IN WATER OR JUICE  Flonase 50 mcg/inh nasal spray: 1 spray(s) in each nostril once a day as needed  GNP GAS RELIEF 80 MG ORAL TABLET CHEWABLE: TAKE 1 TABLET BY MOUTH 4 TIMES PER DAY AFTER MEALS AND AT BEDTIME AS NEEDED FOR GAS  insulin glargine 100 units/mL subcutaneous solution: 6 unit(s) subcutaneous once a day (at bedtime)  ketotifen 0.025% ophthalmic solution: 1 drop(s) to each affected eye 2 times a day as needed  lactobacillus acidophilus oral capsule: 1 cap(s) orally once a day  melatonin 3 mg oral tablet: 2 tab(s) orally once a day (at bedtime)  metoprolol succinate 50 mg oral tablet, extended release: 1 tab(s) orally once a day  mirtazapine 7.5 mg oral tablet: 1 orally once a day  MONTELUKAST SODIUM 10MG TABLET: TAKE 1 TABLET (10 MG) BY MOUTH DAILY  Multiple Vitamins oral tablet: 1 tab(s) orally once a day  nystatin 100,000 units/mL oral suspension: 5 milliliter(s) orally 4 times a day  OMEPRAZOLE-SODIUM BICARBONATE 40-1680MG PACK: 1 PACKET ORALLY DAILY MIXED WITH 5 TO 10 ML OF WATER ON AN EMPTY STOMACH  outpatient physical therapy 3 x a week for 6 weeks:   Physical therapy: 3-5x /week MDD: 1  polyethylene glycol 3350 oral powder for reconstitution: 17 gram(s) orally 2 times a day  ProAir HFA 90 mcg/inh inhalation aerosol: 2 puff(s) inhaled every 6 hours, As Needed  sennosides-docusate 8.6 mg-50 mg oral tablet: 2 tab(s) orally once a day (at bedtime), As Needed  sodium chloride 3% inhalation solution: 1 inhaled 3 times a day  Symbicort 160 mcg-4.5 mcg/inh inhalation aerosol: 2 puff(s) inhaled 2 times a day  tamsulosin 0.4 mg oral capsule: 1 cap(s) orally once a day (at bedtime)    REVIEW OF SYSTEMS:  REVIEW OF SYSTEMS:  CONSTITUTIONAL: (-) weakness, (-) fevers (-) chills  EYES/ENT: (- ) visual changes;  (-) vertigo (-) throat pain   NECK: (- )pain (-) stiffness  RESPIRATORY: (-)cough,  (-) wheezing, (-) hemoptysis; (-) shortness of breath  CARDIOVASCULAR: (-) chest pain (-) palpitations  GASTROINTESTINAL: (-) abdominal (-) epigastric pain. (-) nausea, vomiting, or hematemesis; (-) diarrhea or constipation.   GENITOURINARY: (-) dysuria, frequency or hematuria  NEUROLOGICAL: (-) numbness or weakness  SKIN: (-) itching, rashes  [ ] All other systems negative  [x] Unable to assess ROS because mental status    OBJECTIVE:  ICU Vital Signs Last 24 Hrs  T(C): 36.2 (18 May 2023 02:30), Max: 36.4 (17 May 2023 19:46)  T(F): 97.1 (18 May 2023 02:30), Max: 97.5 (17 May 2023 19:46)  HR: 63 (18 May 2023 01:59) (62 - 66)  BP: 114/59 (17 May 2023 23:22) (114/59 - 152/78)  BP(mean): 76 (17 May 2023 23:22) (76 - 76)  ABP: --  ABP(mean): --  RR: 18 (17 May 2023 23:22) (16 - 24)  SpO2: 100% (18 May 2023 01:59) (100% - 100%)    O2 Parameters below as of 18 May 2023 02:30  Patient On (Oxygen Delivery Method): BiPAP/CPAP, 10/5              CAPILLARY BLOOD GLUCOSE      POCT Blood Glucose.: 446 mg/dL (18 May 2023 02:28)      PHYSICAL EXAM:  GENERAL: cachectic, appears uncomfortable w/mask on  HEENT:  Atraumatic, Normocephalic,  conjunctiva and sclera clear, oral mucosa dry, b/l temporal wasting  NECK: Supple, No JVD  CHEST/LUNG: coarse breath sounds b/l   HEART: RRR; No murmurs, rubs, or gallops  ABDOMEN: Soft, Nontender, Nondistended; Bowel sounds present  EXTREMITIES:  2+ Peripheral Pulses, +3 pitting edema up to ankles b/l  NEUROLOGY: A&Ox0, non-focal, moving all extremities  SKIN: No rashes or lesions    HOSPITAL MEDICATIONS:  MEDICATIONS  (STANDING):    MEDICATIONS  (PRN):      LABS:                        11.9   10.11 )-----------( 133      ( 17 May 2023 22:50 )             36.5         118<LL>  |  78<L>  |  10  ----------------------------<  226<H>  5.7<H>   |  31  |  0.27<L>    Ca    9.3      17 May 2023 22:50  Phos  3.6       Mg     1.70         TPro  7.0  /  Alb  3.5  /  TBili  0.3  /  DBili  x   /  AST  32  /  ALT  15  /  AlkPhos  185<H>        Urinalysis Basic - ( 18 May 2023 00:00 )    Color: Yellow / Appearance: Slightly Turbid / S.016 / pH: x  Gluc: x / Ketone: Negative  / Bili: Negative / Urobili: <2 mg/dL   Blood: x / Protein: 100 mg/dL / Nitrite: Negative   Leuk Esterase: Negative / RBC: 13 /HPF / WBC 2 /HPF   Sq Epi: x / Non Sq Epi: x / Bacteria: Moderate        Venous Blood Gas:   @ 22:50  7.20/107/60/42/88.2  VBG Lactate: 0.6      MICROBIOLOGY:     RADIOLOGY:  [ ] Reviewed and interpreted by me    EKG:

## 2023-05-18 NOTE — CONSULT NOTE ADULT - ASSESSMENT
67 yo F PMH PCD c/b bronchiectasis w/ multiple admissions for bronchiectasis exacerbation, HTN, COVID infection 1/2022, Pseudomonas and ESBL klebsiella, chronic hypoxemic on 2-4LNC and hypercapneic respiratory failure on AVAPS referred from outpatient pulmonologist for worsening mental status and lethargy found to be severely hyponatremia. Pulmonary consulted for bronchiectasis management and hypercarbic respiratory failure    - recommend empiric abx, would reach out to ID given history of multidrug resistant organisms; s/p inhaled tobramycin outpt  - please send sputum cultures  - blood gasses initially showing respiratory acidosis  with improvement after NIPPV; non-lethargic on exam. Would continue AVAPS qhs and prn  - c/w supplemental O2, goal 90-92%  - recommend urine lytes for hypona workup, consider nephrology assistance given worsening sodium and AMS  - please maintain on q6 duonebs, q12 hypersal, mucomyst, and aerobika; please add chest PT and physical therapy  - please obtain TTE  - would recommend LE duplex for asymmetric foot edema  - consider palliative care evaluation  - pulmonary will follow    David Aldrich MD  PGY-6  PCCM Fellow  Pager [phone]

## 2023-05-18 NOTE — ED ADULT NURSE REASSESSMENT NOTE - NS ED NURSE REASSESS COMMENT FT1
pt found to be breathing over AVAPS and awake and alert at this time. MD and RT made aware. pt removed from AVAPS at this time, placed on 3.5L NC. remains on continuos monitor. satting at 98%. awaiting further orders. safety ,maintained, daughter at bedside

## 2023-05-18 NOTE — H&P ADULT - PROBLEM SELECTOR PLAN 8
Diet:  Psych/Sleep:  GI:  DVT ppx:  Code Status:  Dispo: Diet: CC/DASH no beef/pork  Psych/Sleep: None FRANCES  GI: Protonix  DVT ppx: Lovenox 40mg   Code Status: Pending further discusision  Dispo:

## 2023-05-18 NOTE — H&P ADULT - NSHPPHYSICALEXAM_GEN_ALL_CORE
T(C): 36.5 (05-18-23 @ 07:29), Max: 36.9 (05-18-23 @ 04:09)  HR: 81 (05-18-23 @ 07:29) (62 - 81)  BP: 157/71 (05-18-23 @ 07:29) (114/49 - 157/71)  RR: 20 (05-18-23 @ 07:29) (16 - 24)  SpO2: 100% (05-18-23 @ 07:29) (97% - 100%)    CONSTITUTIONAL: Well groomed, no apparent distress  EYES: PERRLA and symmetric, EOMI, No conjunctival or scleral injection, non-icteric  ENMT: Oral mucosa with moist membranes. Normal dentition; no pharyngeal injection or exudates             NECK: Supple, symmetric and without tracheal deviation   RESP: No respiratory distress, no use of accessory muscles; CTA b/l, no WRR  CV: RRR, +S1S2, no MRG; no JVD; no peripheral edema  GI: Soft, NT, ND, no rebound, no guarding; no palpable masses; no hepatosplenomegaly; no hernia palpated  LYMPH: No cervical LAD or tenderness; no axillary LAD or tenderness; no inguinal LAD or tenderness  MSK: Normal gait; No digital clubbing or cyanosis; examination of the (head/neck/spine/ribs/pelvis, RUE, LUE, RLE, LLE) without misalignment,            Normal ROM without pain, no spinal tenderness, normal muscle strength/tone  SKIN: No rashes or ulcers noted; no subcutaneous nodules or induration palpable  NEURO: CN II-XII intact; normal reflexes in upper and lower extremities, sensation intact in upper and lower extremities b/l to light touch   PSYCH: Appropriate insight/judgment; A+O x 3, mood and affect appropriate, recent/remote memory intact T(C): 36.5 (05-18-23 @ 07:29), Max: 36.9 (05-18-23 @ 04:09)  HR: 81 (05-18-23 @ 07:29) (62 - 81)  BP: 157/71 (05-18-23 @ 07:29) (114/49 - 157/71)  RR: 20 (05-18-23 @ 07:29) (16 - 24)  SpO2: 100% (05-18-23 @ 07:29) (97% - 100%)    CONSTITUTIONAL: Well groomed, appears generally fatigued; SOB  EYES: PERRLA and symmetric,  ENMT: Oral mucosa with moist membranes. Normal dentition; no pharyngeal injection or exudates  NECK: Supple, symmetric and without tracheal deviation   RESP: diffuse wheezes with fine crackles  CV: RRR, +S1S2, no MRG; no JVD appreciated; no peripheral edema; patient reports difficulty lying flat, sleeps at an angle  GI: Soft, NT, ND, no rebound, no guarding; no palpable masses; no tenderness to palpation  LYMPH: No cervical LAD or tenderness; no axillary LAD or tenderness; no inguinal LAD or tenderness  MSK: examination of the (head/neck/spine/ribs/pelvis, RUE, LUE, RLE, LLE) without misalignment,   SKIN: No rashes or ulcers noted; no subcutaneous nodules or induration palpable  PSYCH: Appropriate insight/judgment; A+O x 3, mood and affect appropriate, recent/remote memory intact

## 2023-05-18 NOTE — HISTORY OF PRESENT ILLNESS
[Home] : at home, [unfilled] , at the time of the visit. 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  . She has had recurrent hospitalizations for exacerbations of bronchiectasis. \par Regimen includes  Albuterol, Hypersal, Aerobika TID, and Spiriva, Symbicort 160 BID, Singulair. She has not been able to tolerate inhaled tobramycin or amikacin. She was recently started on inhaled colistin which she tolerated during her last admission.  \par \par Previous cultures have been negative for mycobacteria but have  grown Pseudomonas  resistnat to fluoro quinolones and gentamycin, ciptobramycin.\par \par Last TTE 6/2021 overall normal but unable to estimate PASP\par \par  PFTs  show severe restriction and obstruction and DLCO is reduced at well.  She had difficulty performing the maneuvers. On exercise oximetry she desaturated to 88% when walking on RA.  ON 2 L NC oxygen saturation remained in the mid 90s.  \par \par Patient using her NIV at night and is receiving supplies.  Not taking mirtazapine.  However weight is stable over the past year.  \par \par Hospitalized 9/28-10/8/22 at [facility] with abdominal pain.  had exacerbation of bronchiectasis and evidence of nonobstructing choledocholithiasis and ERCP was recommended as outpatient.  GI evaluated the pt and recommended outpatient ERCP and MRCP was done 10/2\par which showed Hepaticojejunostomy, Pneumobilia but no choledocholithiasis in CBD leading to the hepaticojejunostomy. Previously seen calculi in CT are located in the distal preampullary precluded CBD.\par \par sputum cx with pseudomonas (R to genta, tobra, levo)\par \par She was treated with Zosyn IV, prednisone, inhaled colistin, which she tolerated and discharged in  early october.  \par Labs notable for negative RVP. alk phos was 244, SGOT 109, SGPT 132. \par VBG 7.35/66/58 \par \par Televisit today:  Hospitalized 3/29/23 -4/11/23 treated with Cefepime, clearance regimen. CO2 was in the 70s that admission. Sodium was 123 in March, put on fluid restriction. \par on D/C Azithromycin 500mg TIW,  Not taking prednisone currently.\par Has completed [place]kis 3 days ago.  Using AVAPS for 5-7 hrs every night.  \par on oxygen 3.5 l NC.  \par Daughter in law reports that she is not doing well- she is confused for the last few days and not recognizing family.  She is not febrile but feels cold.  continues to lose weight- Weight is now 88 lbs.  She feels very weak and feet are swollen which is new for her.  She has thick tenacious mucous and does not have the strength to cough it up.

## 2023-05-18 NOTE — ED ADULT NURSE REASSESSMENT NOTE - NS ED NURSE REASSESS COMMENT FT1
pt requested to use bed pan. pt became dyspneic upon exertion, placed on 5L NC and used rescue inhaler. pt satting at 100% on 5L NC. House team 8 paged 4x with no answer. (pager numbers [phone], [phone], and MD Escamilla.) accpting RN on [phone] paged for update. pt resting comfortably in stretcher at this time. . critical value  reported to RN. MD paged with no answer

## 2023-05-18 NOTE — H&P ADULT - PROBLEM SELECTOR PLAN 3
POCT on admission in 400s  Given 10U Insulin   Resolved C/W Home Meds  Amlodipine 5mg qd  Toprol xl 50 qd Suspect SIADH based on prior as well as significant lung pathology; recent nausea/vomiting Suspect SIADH based on prior as well as significant lung pathology; recent nausea/vomiting  Absolute limit 127 at 5am 5/19    Plan:  F/U Urine Studies  Fluid restriction to 1 L/day  BMP q6h

## 2023-05-18 NOTE — H&P ADULT - PROBLEM SELECTOR PLAN 4
Jamal Murillo POCT on admission in 400s  Given 10U Insulin   Resolved C/W Home Meds  Amlodipine 5mg qd  Toprol xl 50 qd

## 2023-05-18 NOTE — ED ADULT NURSE REASSESSMENT NOTE - NS ED NURSE REASSESS COMMENT FT1
pt placed on Bipap at this time at 10/5. pt tolerating well. pt medicated as per MD orders.  at this time. MD Liborio Berry made aware. awaiting further orders. remains on continuos monitor, NSR noted. pt placed on Bipap at this time at 10/5. pt tolerating well. RN and daughter at bedside attempted to remove dentures from pt, pt did not allow. pt medicated as per MD orders.  at this time. rectal temperature 97.1. MD Liborio Berry made aware. removed hyperthermia blanket as per MD orders. awaiting further orders. remains on continuos monitor, NSR noted.

## 2023-05-18 NOTE — H&P ADULT - PROBLEM SELECTOR PLAN 5
Patient shifted in the ED with resolution Jamal Murillo POCT on admission in 400s  Given 10U Insulin   Resolved POCT on admission in 400s  Given 10U Insulin       Restart home regiment  4U Lantus  2/2/2 Admelog

## 2023-05-18 NOTE — CONSULT NOTE ADULT - SUBJECTIVE AND OBJECTIVE BOX
CHIEF COMPLAINT: AMS    HPI:  67 yo F PMH PCD c/b bronchiectasis w/ multiple admissions for bronchiectasis exacerbation, HTN, COVID infection 2022, Pseudomonas and ESBL klebsiella, chronic hypoxemic on 2-4LNC and hypercapneic respiratory failure on AVAPS referred from outpatient pulmonologist for worsening mental status and lethargy. Of note, pt was hospitalized from 3/29/23 to 23 and treated with abx - noted to have hypercapnea to 70s during that admission as well. Evaluated by telehealth by pulmonary on day of admisison and per daughter noted to be more confused - not recognizing family. No notable fevers though with continued weight loss. ROS also notable for weakness and LE swelling w/ associated thick mucous and congestion. Given symptoms recommended to present to ED for further evaluation.     In ED noted to be afebrile and normotensive with saturations of 100% on 4LNC. Labs in ED notable for no leukocytosis though significant hyponatremia to 118 with K 5.7. VBG with pH 7.2/107/60. CXR without focal consolidation. Placed on avaps with improvement in blood gas to 7.31. Pulmonary consulted for further evaluation.     PAST MEDICAL & SURGICAL HISTORY:  ABPA (allergic bronchopulmonary aspergillosis)      Bronchiectasis      Hypertension      Vitamin D deficiency      Microcytic anemia      GERD (gastroesophageal reflux disease)      Postnasal drip      Pseudomonas aeruginosa colonization      Allergic rhinitis      Recurrent pneumonia      Type 2 diabetes mellitus, with long-term current use of insulin      Anxiety and depression      Bronchiectasis with acute exacerbation      History of cholecystectomy      S/P dilatation of esophageal stricture          FAMILY HISTORY:  Family history of diabetes mellitus  father    Family history of allergic rhinitis (Child, Child)  son, daughter    Family history of bronchitis  mother, father        SOCIAL HISTORY:  Smoking: [ ] Never Smoked [ ] Former Smoker (__ packs x ___ years) [ ] Current Smoker  (__ packs x ___ years)  Substance Use: [ ] Never Used [ ] Used ____  EtOH Use:  Marital Status: [ ] Single [ ]  [ ]  [ ]   Sexual History:   Occupation:  Recent Travel:  Country of Birth:  Advance Directives:    Allergies    No Known Allergies    Intolerances        HOME MEDICATIONS:  Home Medications:  Admelog SoloStar 100 units/mL injectable solution: 2 unit(s) injectable 3 times a day (before meals) (depending on reading). (2023 12:54)  ALBUTEROL SULFATE (2.5 MG/3ML)0.083% NEBULIZER: USE 1 UNIT DOSE EVERY 4-6 HOURS AS NEEDED FOR WHEEZING . (30 Mar 2023 03:32)  AMLODIPINE BESYLATE 5MG TABLET: TAKE 1 TABLET (5 MG) BY MOUTH DAILY FOR HIGH BLOOD PRESSURE (2023 12:48)  bisacodyl 5 mg oral delayed release tablet: 1 tab(s) orally once daily as needed (30 Mar 2023 03:32)  Calcium 600+D 600 mg-5 mcg (200 intl units) oral tablet: 1 tab(s) orally 2 times a day (30 Mar 2023 03:32)  CYANOCOBALAMIN 1000MCG/ML SOLUTION: ADMINISTER 1 MILLILITER (1,000 MCG) SUBCUTANEOUS EVERY 7 DAYS (30 Mar 2023 03:32)  FERROUS GLUCONATE 324 (38 FE)MG TABLET: TAKE 1 TABLET BY MOUTH 2 TIMES PER DAY BETWEEN MEALS IN WATER OR JUICE (30 Mar 2023 03:32)  Flonase 50 mcg/inh nasal spray: 1 spray(s) in each nostril once a day as needed (30 Mar 2023 03:32)  GNP GAS RELIEF 80 MG ORAL TABLET CHEWABLE: TAKE 1 TABLET BY MOUTH 4 TIMES PER DAY AFTER MEALS AND AT BEDTIME AS NEEDED FOR GAS (30 Mar 2023 03:32)  insulin glargine 100 units/mL subcutaneous solution: 6 unit(s) subcutaneous once a day (at bedtime) (2023 12:54)  ketotifen 0.025% ophthalmic solution: 1 drop(s) to each affected eye 2 times a day as needed (30 Mar 2023 03:32)  metoprolol succinate 50 mg oral tablet, extended release: 1 tab(s) orally once a day (30 Mar 2023 03:32)  mirtazapine 7.5 mg oral tablet: 1 orally once a day (30 Mar 2023 03:32)  MONTELUKAST SODIUM 10MG TABLET: TAKE 1 TABLET (10 MG) BY MOUTH DAILY (2023 12:48)  Multiple Vitamins oral tablet: 1 tab(s) orally once a day (30 Mar 2023 03:32)  OMEPRAZOLE-SODIUM BICARBONATE 40-1680MG PACK: 1 PACKET ORALLY DAILY MIXED WITH 5 TO 10 ML OF WATER ON AN EMPTY STOMACH (30 Mar 2023 03:32)  ProAir HFA 90 mcg/inh inhalation aerosol: 2 puff(s) inhaled every 6 hours, As Needed (30 Mar 2023 03:32)  sennosides-docusate 8.6 mg-50 mg oral tablet: 2 tab(s) orally once a day (at bedtime), As Needed (30 Mar 2023 03:32)  sodium chloride 3% inhalation solution: 1 inhaled 3 times a day (30 Mar 2023 03:32)  Symbicort 160 mcg-4.5 mcg/inh inhalation aerosol: 2 puff(s) inhaled 2 times a day (30 Mar 2023 03:32)      REVIEW OF SYSTEMS:  Constitutional: [x] negative [ ] fevers [ ] chills [ ] weight loss [ ] weight gain  HEENT: [x] negative [ ] dry eyes [ ] eye irritation [ ] postnasal drip [ ] nasal congestion  CV: [x] negative  [ ] chest pain [ ] orthopnea [ ] palpitations [ ] murmur  Resp: [x] negative [ ] cough [ ] shortness of breath [ ] dyspnea [ ] wheezing [ ] sputum [ ] hemoptysis  GI: [x] negative [ ] nausea [ ] vomiting [ ] diarrhea [ ] constipation [ ] abd pain [ ] dysphagia   : [x] negative [ ] dysuria [ ] nocturia [ ] hematuria [ ] increased urinary frequency  Musculoskeletal: [x] negative [ ] back pain [ ] myalgias [ ] arthralgias [ ] fracture  Skin: [x] negative [ ] rash [ ] itch  Neurological: [x] negative [ ] headache [ ] dizziness [ ] syncope [ ] weakness [ ] numbness  Psychiatric: [ ] negative [ ] anxiety [ ] depression  Endocrine: [ ] negative [ ] diabetes [ ] thyroid problem  Hematologic/Lymphatic: [ ] negative [ ] anemia [ ] bleeding problem  Allergic/Immunologic: [ ] negative [ ] itchy eyes [ ] nasal discharge [ ] hives [ ] angioedema  [x] All other systems negative  [ ] Unable to assess ROS because ________    OBJECTIVE:  ICU Vital Signs Last 24 Hrs  T(C): 36.5 (18 May 2023 07:29), Max: 36.9 (18 May 2023 04:09)  T(F): 97.7 (18 May 2023 07:29), Max: 98.4 (18 May 2023 04:09)  HR: 81 (18 May 2023 07:29) (62 - 81)  BP: 157/71 (18 May 2023 07:29) (114/49 - 157/71)  BP(mean): 68 (18 May 2023 04:09) (68 - 76)  ABP: --  ABP(mean): --  RR: 20 (18 May 2023 07:29) (16 - 24)  SpO2: 100% (18 May 2023 07:29) (97% - 100%)    O2 Parameters below as of 18 May 2023 07:29  Patient On (Oxygen Delivery Method): nasal cannula  O2 Flow (L/min): 3.5        Mode: standby    CAPILLARY BLOOD GLUCOSE      POCT Blood Glucose.: 149 mg/dL (18 May 2023 08:36)      PHYSICAL EXAM:  General: NAD  HEENT: EOMI, PERRL  Neck: Supple  Respiratory: Clear, no wheezes or increased WOB  Cardiovascular: RRR no mrg  Abdomen: Soft, NT, ND, no rebound or guarding  Extremities: WWP, no edema  Skin: Intact, no rashes  Neurological: AOx3  Psychiatry: Normal affect    HOSPITAL MEDICATIONS:  Standing Meds:  albuterol/ipratropium for Nebulization 3 milliLiter(s) Nebulizer every 6 hours  dextrose 5%. 1000 milliLiter(s) IV Continuous <Continuous>  dextrose 5%. 1000 milliLiter(s) IV Continuous <Continuous>  dextrose 50% Injectable 12.5 Gram(s) IV Push once  dextrose 50% Injectable 25 Gram(s) IV Push once  dextrose 50% Injectable 25 Gram(s) IV Push once  glucagon  Injectable 1 milliGRAM(s) IntraMuscular once  insulin lispro (ADMELOG) corrective regimen sliding scale   SubCutaneous three times a day before meals  insulin lispro (ADMELOG) corrective regimen sliding scale   SubCutaneous at bedtime  polyethylene glycol 3350 17 Gram(s) Oral daily  senna 2 Tablet(s) Oral at bedtime      PRN Meds:  dextrose Oral Gel 15 Gram(s) Oral once PRN      LABS:                        11.9   10.11 )-----------( 133      ( 17 May 2023 22:50 )             36.5     Hgb Trend: 11.9<--  05-18    117<LL>  |  79<L>  |  13  ----------------------------<  161<H>  4.8   |  32<H>  |  0.35<L>    Ca    9.2      18 May 2023 05:00  Phos  3.3       Mg     1.60         TPro  7.0  /  Alb  3.5  /  TBili  0.3  /  DBili  x   /  AST  32  /  ALT  15  /  AlkPhos  185<H>      Creatinine Trend: 0.35<--, 0.27<--    Urinalysis Basic - ( 18 May 2023 00:00 )    Color: Yellow / Appearance: Slightly Turbid / S.016 / pH: x  Gluc: x / Ketone: Negative  / Bili: Negative / Urobili: <2 mg/dL   Blood: x / Protein: 100 mg/dL / Nitrite: Negative   Leuk Esterase: Negative / RBC: 13 /HPF / WBC 2 /HPF   Sq Epi: x / Non Sq Epi: x / Bacteria: Moderate        Venous Blood Gas:   @ 03:10  7.31/75/132/38/98.5  VBG Lactate: 1.0  Venous Blood Gas:   @ 22:50  7.20/107/60/42/88.2  VBG Lactate: 0.6      MICROBIOLOGY:       RADIOLOGY:  [ ] Reviewed and interpreted by me    PULMONARY FUNCTION TESTS:    EKG:   CHIEF COMPLAINT: AMS    HPI:  69 yo F PMH PCD c/b bronchiectasis w/ multiple admissions for bronchiectasis exacerbation, HTN, COVID infection 2022, Pseudomonas and ESBL klebsiella, chronic hypoxemic on 2-4LNC and hypercapneic respiratory failure on AVAPS referred from outpatient pulmonologist for worsening mental status and lethargy. Of note, pt was hospitalized from 3/29/23 to 23 and treated with abx - noted to have hypercapnea to 70s during that admission as well. Evaluated by telehealth by pulmonary on day of admisison and per daughter noted to be more confused - not recognizing family. No notable fevers though with continued weight loss. ROS also notable for weakness and LE swelling w/ associated thick mucous and congestion. Given symptoms recommended to present to ED for further evaluation.     In ED noted to be afebrile and normotensive with saturations of 100% on 4LNC. Labs in ED notable for no leukocytosis though significant hyponatremia to 118 with K 5.7. VBG with pH 7.2/107/60. CXR without focal consolidation. Placed on avaps with improvement in blood gas to 7.31. Pulmonary consulted for further evaluation.     PAST MEDICAL & SURGICAL HISTORY:  ABPA (allergic bronchopulmonary aspergillosis)      Bronchiectasis      Hypertension      Vitamin D deficiency      Microcytic anemia      GERD (gastroesophageal reflux disease)      Postnasal drip      Pseudomonas aeruginosa colonization      Allergic rhinitis      Recurrent pneumonia      Type 2 diabetes mellitus, with long-term current use of insulin      Anxiety and depression      Bronchiectasis with acute exacerbation      History of cholecystectomy      S/P dilatation of esophageal stricture          FAMILY HISTORY:  Family history of diabetes mellitus  father    Family history of allergic rhinitis (Child, Child)  son, daughter    Family history of bronchitis  mother, father        SOCIAL HISTORY:  Smoking: [x] Never Smoked [ ] Former Smoker (__ packs x ___ years) [ ] Current Smoker  (__ packs x ___ years)  Substance Use: [x] Never Used [ ] Used ____  EtOH Use: Denies  Marital Status: [ ] Single [ ]  [ ]  [ ]   Sexual History:   Occupation:  Recent Travel:  Country of Birth:  Advance Directives:    Allergies    No Known Allergies    Intolerances        HOME MEDICATIONS:  Home Medications:  Admelog SoloStar 100 units/mL injectable solution: 2 unit(s) injectable 3 times a day (before meals) (depending on reading). (2023 12:54)  ALBUTEROL SULFATE (2.5 MG/3ML)0.083% NEBULIZER: USE 1 UNIT DOSE EVERY 4-6 HOURS AS NEEDED FOR WHEEZING . (30 Mar 2023 03:32)  AMLODIPINE BESYLATE 5MG TABLET: TAKE 1 TABLET (5 MG) BY MOUTH DAILY FOR HIGH BLOOD PRESSURE (2023 12:48)  bisacodyl 5 mg oral delayed release tablet: 1 tab(s) orally once daily as needed (30 Mar 2023 03:32)  Calcium 600+D 600 mg-5 mcg (200 intl units) oral tablet: 1 tab(s) orally 2 times a day (30 Mar 2023 03:32)  CYANOCOBALAMIN 1000MCG/ML SOLUTION: ADMINISTER 1 MILLILITER (1,000 MCG) SUBCUTANEOUS EVERY 7 DAYS (30 Mar 2023 03:32)  FERROUS GLUCONATE 324 (38 FE)MG TABLET: TAKE 1 TABLET BY MOUTH 2 TIMES PER DAY BETWEEN MEALS IN WATER OR JUICE (30 Mar 2023 03:32)  Flonase 50 mcg/inh nasal spray: 1 spray(s) in each nostril once a day as needed (30 Mar 2023 03:32)  GNP GAS RELIEF 80 MG ORAL TABLET CHEWABLE: TAKE 1 TABLET BY MOUTH 4 TIMES PER DAY AFTER MEALS AND AT BEDTIME AS NEEDED FOR GAS (30 Mar 2023 03:32)  insulin glargine 100 units/mL subcutaneous solution: 6 unit(s) subcutaneous once a day (at bedtime) (2023 12:54)  ketotifen 0.025% ophthalmic solution: 1 drop(s) to each affected eye 2 times a day as needed (30 Mar 2023 03:32)  metoprolol succinate 50 mg oral tablet, extended release: 1 tab(s) orally once a day (30 Mar 2023 03:32)  mirtazapine 7.5 mg oral tablet: 1 orally once a day (30 Mar 2023 03:32)  MONTELUKAST SODIUM 10MG TABLET: TAKE 1 TABLET (10 MG) BY MOUTH DAILY (2023 12:48)  Multiple Vitamins oral tablet: 1 tab(s) orally once a day (30 Mar 2023 03:32)  OMEPRAZOLE-SODIUM BICARBONATE 40-1680MG PACK: 1 PACKET ORALLY DAILY MIXED WITH 5 TO 10 ML OF WATER ON AN EMPTY STOMACH (30 Mar 2023 03:32)  ProAir HFA 90 mcg/inh inhalation aerosol: 2 puff(s) inhaled every 6 hours, As Needed (30 Mar 2023 03:32)  sennosides-docusate 8.6 mg-50 mg oral tablet: 2 tab(s) orally once a day (at bedtime), As Needed (30 Mar 2023 03:32)  sodium chloride 3% inhalation solution: 1 inhaled 3 times a day (30 Mar 2023 03:32)  Symbicort 160 mcg-4.5 mcg/inh inhalation aerosol: 2 puff(s) inhaled 2 times a day (30 Mar 2023 03:32)      REVIEW OF SYSTEMS:  Constitutional: [x] negative [ ] fevers [ ] chills [ ] weight loss [ ] weight gain  HEENT: [x] negative [ ] dry eyes [ ] eye irritation [ ] postnasal drip [ ] nasal congestion  CV: [x] negative  [ ] chest pain [ ] orthopnea [ ] palpitations [ ] murmur  Resp: [ ] negative [x] cough [x] shortness of breath [ ] dyspnea [ ] wheezing [ ] sputum [ ] hemoptysis  GI: [x] negative [ ] nausea [ ] vomiting [ ] diarrhea [ ] constipation [ ] abd pain [ ] dysphagia   : [x] negative [ ] dysuria [ ] nocturia [ ] hematuria [ ] increased urinary frequency  Musculoskeletal: [x] negative [ ] back pain [ ] myalgias [ ] arthralgias [ ] fracture  Skin: [x] negative [ ] rash [ ] itch  Neurological: [x] negative [ ] headache [ ] dizziness [ ] syncope [ ] weakness [ ] numbness  Psychiatric: [ ] negative [ ] anxiety [ ] depression  Endocrine: [ ] negative [ ] diabetes [ ] thyroid problem  Hematologic/Lymphatic: [ ] negative [ ] anemia [ ] bleeding problem  Allergic/Immunologic: [ ] negative [ ] itchy eyes [ ] nasal discharge [ ] hives [ ] angioedema  [x] All other systems negative  [ ] Unable to assess ROS because ________    OBJECTIVE:  ICU Vital Signs Last 24 Hrs  T(C): 36.5 (18 May 2023 07:29), Max: 36.9 (18 May 2023 04:09)  T(F): 97.7 (18 May 2023 07:29), Max: 98.4 (18 May 2023 04:09)  HR: 81 (18 May 2023 07:29) (62 - 81)  BP: 157/71 (18 May 2023 07:29) (114/49 - 157/71)  BP(mean): 68 (18 May 2023 04:09) (68 - 76)  ABP: --  ABP(mean): --  RR: 20 (18 May 2023 07:29) (16 - 24)  SpO2: 100% (18 May 2023 07:29) (97% - 100%)    O2 Parameters below as of 18 May 2023 07:29  Patient On (Oxygen Delivery Method): nasal cannula  O2 Flow (L/min): 3.5        Mode: standby    CAPILLARY BLOOD GLUCOSE      POCT Blood Glucose.: 149 mg/dL (18 May 2023 08:36)      PHYSICAL EXAM:  General: Female, thin, NAD  HEENT: EOMI, PERRL  Neck: Supple  Respiratory: Coarse rales, no wheezes or increased WOB  Cardiovascular: RRR no mrg  Abdomen: Soft, NT, ND, no rebound or guarding  Extremities: WWP, asymmetric foot swelling  Skin: Intact, no rashes  Neurological: No gross focal deficits  Psychiatry: Normal affect    HOSPITAL MEDICATIONS:  Standing Meds:  albuterol/ipratropium for Nebulization 3 milliLiter(s) Nebulizer every 6 hours  dextrose 5%. 1000 milliLiter(s) IV Continuous <Continuous>  dextrose 5%. 1000 milliLiter(s) IV Continuous <Continuous>  dextrose 50% Injectable 12.5 Gram(s) IV Push once  dextrose 50% Injectable 25 Gram(s) IV Push once  dextrose 50% Injectable 25 Gram(s) IV Push once  glucagon  Injectable 1 milliGRAM(s) IntraMuscular once  insulin lispro (ADMELOG) corrective regimen sliding scale   SubCutaneous three times a day before meals  insulin lispro (ADMELOG) corrective regimen sliding scale   SubCutaneous at bedtime  polyethylene glycol 3350 17 Gram(s) Oral daily  senna 2 Tablet(s) Oral at bedtime      PRN Meds:  dextrose Oral Gel 15 Gram(s) Oral once PRN      LABS:                        11.9   10.11 )-----------( 133      ( 17 May 2023 22:50 )             36.5     Hgb Trend: 11.9<--  05-18    117<LL>  |  79<L>  |  13  ----------------------------<  161<H>  4.8   |  32<H>  |  0.35<L>    Ca    9.2      18 May 2023 05:00  Phos  3.3       Mg     1.60         TPro  7.0  /  Alb  3.5  /  TBili  0.3  /  DBili  x   /  AST  32  /  ALT  15  /  AlkPhos  185<H>      Creatinine Trend: 0.35<--, 0.27<--    Urinalysis Basic - ( 18 May 2023 00:00 )    Color: Yellow / Appearance: Slightly Turbid / S.016 / pH: x  Gluc: x / Ketone: Negative  / Bili: Negative / Urobili: <2 mg/dL   Blood: x / Protein: 100 mg/dL / Nitrite: Negative   Leuk Esterase: Negative / RBC: 13 /HPF / WBC 2 /HPF   Sq Epi: x / Non Sq Epi: x / Bacteria: Moderate        Venous Blood Gas:   @ 03:10  7.31/75/132/38/98.5  VBG Lactate: 1.0  Venous Blood Gas:   @ 22:50  7.20/107/60/42/88.2  VBG Lactate: 0.6      MICROBIOLOGY:       RADIOLOGY:  [ ] Reviewed and interpreted by me    PULMONARY FUNCTION TESTS:    EKG:

## 2023-05-18 NOTE — H&P ADULT - PROBLEM SELECTOR PLAN 2
Suspect SIADH based on prior as well as significant lung pathology; recent nausea/vomiting Patient with acute metabolic encephalopathy likely iso hypercapnia      Plan:  As above Patient with acute metabolic encephalopathy likely iso hypercapnia    Plan:  As above

## 2023-05-18 NOTE — H&P ADULT - ATTENDING COMMENTS
68F with PMH of primary ciliary dyskinesia, bronchiectasis, chronic hypoxic/hypercapnic respiratory failure on 4LNC/AVAPS at home, HTN, chronic constipation, recently hospitalized for resp failure who presents to hospital w/ AMS and SOB. Initial workup notable for respiratory acidosis/hypercarbia on gas. Pt also noted to be hyponatremic. CXR appeared stable w/ chronic changes when compared to before. No overt signs of infection based on current workup. Admit for acute on chronic hypoxic/hypercapnic respiratory failure 2’ PCD/bronchiectasis. MICU was consulted, but pt deemed stable for floor. Pulm on board for further input.     Pt seen at bedside. Spouse at bedside. Daughter was called and acted as  per family request. Family feels pt has improved mentation wise. She continues to endorse SOB. Swelling that was noticed yesterday has significantly improved. Repeat labs are in progress. On exam, pt sitting upright, nontoxic, NAD. Speaking full sentences. Dry MM. Chest w/ course breath sounds throughout. Fair air entry, normal effort. CVS RRr, S1S2. Abd soft NT, ND, +BSx4. Trace pedal edema. Neuro nonfocal.     #Acute on chronic hypercapnic and hypoxemic respiratory failure 2' bronchiectasis, primary ciliary dyskinesia  #Metabolic encephalopathy 2’ hypercarbia/above.  #Respiratory acidosis  #Hyponatremia, poss hypovolemic v. SIADH  #HTN  #Hyperkalemia  #Thrombocytopenia    -IV Abx as above. Pulmonary toilet. NC and AVAPS for support.  -Pulm recs appreciated.   -Ulytes w/ low sodium, UOsm high – dry on exam - no sig peripheral edema noted on my exam. Trial fluid – NS 500x1.  -Serial BMP q6h – avoid excess correct 6-8 meq.  -Rest of plan as outlined above.

## 2023-05-18 NOTE — H&P ADULT - ASSESSMENT
68F with hx of bronchiectasis on home AVAPS and primary ciliary dyskinesia is here today for acute hypoxemic hypercapneic respiratory failure in the setting of possible sepsis although differential includes new HF exacerbation,  68F with hx of bronchiectasis iso primary ciliary diskinesia on home AVAPS and home o2 is here today for acute on chronic hypoxemic hypercapneic respiratory failure in the setting of possible sepsis although differential includes viral illness, pneumonia, new HF exacerbation

## 2023-05-18 NOTE — H&P ADULT - NSHPLABSRESULTS_GEN_ALL_CORE
11.9   10.11 )-----------( 133      ( 17 May 2023 22:50 )             36.5       -    117<LL>  |  79<L>  |  13  ----------------------------<  161<H>  4.8   |  32<H>  |  0.35<L>    Ca    9.2      18 May 2023 05:00  Phos  3.3       Mg     1.60         TPro  7.0  /  Alb  3.5  /  TBili  0.3  /  DBili  x   /  AST  32  /  ALT  15  /  AlkPhos  185<H>                Urinalysis Basic - ( 18 May 2023 00:00 )    Color: Yellow / Appearance: Slightly Turbid / S.016 / pH: x  Gluc: x / Ketone: Negative  / Bili: Negative / Urobili: <2 mg/dL   Blood: x / Protein: 100 mg/dL / Nitrite: Negative   Leuk Esterase: Negative / RBC: 13 /HPF / WBC 2 /HPF   Sq Epi: x / Non Sq Epi: x / Bacteria: Moderate            Lactate Trend            CAPILLARY BLOOD GLUCOSE      POCT Blood Glucose.: 137 mg/dL (18 May 2023 06:16)

## 2023-05-18 NOTE — H&P ADULT - HISTORY OF PRESENT ILLNESS
68F with hx of primary ciliary dyskinesia, HTN bronchiectasis on 4L NC w/ frequent admissions for bronchiectasis (4/22) presents with 3 days of AMS and vomiting, found to be hypercapneic by outpatient PCP; recommending going to the ED. ,       ED Vitals 97.5, 152/68, 66BPM, 24 100% 4LNC  Patient received CFTX x1, 10U Insulin, with D50, got Duonebs     68F with hx of primary ciliary dyskinesia, HTN, chronic constipationbronchiectasis on 4L NC w/ frequent admissions for bronchiectasis (4/22) presents with 3 days of AMS and vomiting, found to be hypercapneic by outpatient PCP; recommending going to the ED. ,     of note; patient was here from march 29 to april 11th for bronchiectasis exacerbation as well as hyponatremia likely iso SIADH. Patient underwent 5 day course of prednisone, 7 day course of cefepime, and significant pulmonary toilet and was DC'd with AVAPS at home.     ED Vitals 97.5, 152/68, 66BPM, 24 100% 4LNC  Patient received CFTX x1, 10U Insulin, with D50, got Duonebs     Spoke to patient using  [place]arshall [id number]    68F with hx of primary ciliary dyskinesia, HTN, chronic constipationbronchiectasis on 4L NC w/ frequent admissions for bronchiectasis (4/22) presents with 3 days of AMS and vomiting, found to be hypercapneic by outpatient PCP; recommending going to the ED.    of note; patient was here from march 29 to april 11th for bronchiectasis exacerbation as well as hyponatremia likely iso SIADH. Patient underwent 5 day course of prednisone, 7 day course of cefepime, and significant pulmonary toilet and was DC'd with AVAPS at home.     Patient is a poor historian and information was also obtained from daughter in law Suki Rodney. Daughter in law reports that since their recent discharge from [facility]; she has been very compliant     ED Vitals 97.5, 152/68, 66BPM, 24 100% 4LNC  Patient received CFTX x1, 10U Insulin, with D50, got Duonebs     Spoke to patient using  Marshall [id number]    68F with hx of primary ciliary dyskinesia, HTN, chronic constipation, bronchiectasis on 4L NC w/ frequent admissions for bronchiectasis (4/22) presents with 3 days of AMS and vomiting, found to be hypercapneic by outpatient PCP; recommending going to the ED.    of note; patient was here from march 29 to april 11th for bronchiectasis exacerbation as well as hyponatremia likely iso SIADH. Patient underwent 5 day course of prednisone, 7 day course of cefepime, and significant pulmonary toilet and was DC'd with AVAPS at home.     Patient is a poor historian and information was also obtained from daughter in law Suki Rodney. Daughter in law reports that since their recent discharge from [facility]; she has been very compliant with her medication.     ED Vitals 97.5, 152/68, 66BPM, 24 100% 4LNC  Patient received CFTX x1, 10U Insulin, with D50, got Duonebs     Spoke to patient using  Marshall [id number]    68F with hx of primary ciliary dyskinesia, HTN, chronic constipation, bronchiectasis on 4L NC w/ frequent admissions for bronchiectasis (4/22) presents with 3 days of AMS and vomiting, found to be hypercapneic by outpatient PCP; recommending going to the ED.    of note; patient was here from march 29 to april 11th for bronchiectasis exacerbation as well as hyponatremia likely iso SIADH. Patient underwent 5 day course of prednisone, 7 day course of cefepime, and significant pulmonary toilet and was DC'd with AVAPS at home.     Patient is a poor historian and information was also obtained from daughter in law Suki Rodney. Daughter in law reports that since their recent discharge from [facility]; she has been very compliant with her medication and with the AVAPS. However, she reports they consistently get very elevated pCO2 levels despite compliance. Over the past three days, patient reports she has become more dyspneic and has a lot of mucus in her airway. Daughter reports patient appears more confused and altered for the past three days and has had episodes of nausea and vomiting. Patient also endorses difficulty urinating as well as 5 days of constipation. Denies any recent travel to foreign countries, denies any sick contacts, any allergies or any recent fevers or changes in her medications.     ED Vitals 97.5, 152/68, 66BPM, 24 100% 4LNC  Patient received CFTX x1, 10U Insulin, with D50, got Duonebs. Patient was hypercapnic in the ED. MICU was consulted for potential intubation, but patient's hypercapnia improved on AVAPS while in the ED and MICU determined no need for urgent intubation

## 2023-05-18 NOTE — ASSESSMENT
[FreeTextEntry1] : Patient is a 69 yo F w/ probable PCD (characterized by sinusitis, bronchiectasis and recurrent infections), recent COVID (1/2022, hospitalized x 2 weeks and also treated with Meropenem for Pseudomonas and ESBL Klebsiella in sputum), chronic hypoxemic (O2 2 to 4L nc) and hypercapnic respiratory failure (On AVAPS) who presentsfor televisit following hospitalization in April, 2023 for bronchiectasis exacerbation and hyponatremia.  She has chronic respiratory failure.  \par She is confused today according to the daughter in law, not recognizing family, has new lower extremity edema, feels cold and is having difficulty expectorating secretions.  She has continued to lose weight. \par \par Impression:  chronic respiratory faiure, worsening, with failure to thrive,  Confusion could be secondary to progressive hypercapnea or hyponatremia.\par Strongly urged family to take her to the ED at [facility].  Spoke with triage nurse in ED at [facility] and pulmonary fellow.  \par \par Recomend:  pan culture, CT Chest, echo, nutrition and palliative care consult.  Start antibiotics.\par - c/w Spiriva, Symbicort 160 BID, Singulair and clearance regimen. \par -- c/w supplemental O2 and AVAPS QHS\par -continue Colistin every other month.  \par - \par \par \par

## 2023-05-18 NOTE — H&P ADULT - NSHPREVIEWOFSYSTEMS_GEN_ALL_CORE
REVIEW OF SYSTEMS:  CONSTITUTIONAL: No weakness, fevers or chills  EYES/ENT: No visual changes;  No vertigo or throat pain   NECK: No pain or stiffness  RESPIRATORY: No cough, wheezing, hemoptysis; No shortness of breath  CARDIOVASCULAR: No chest pain or palpitations  GASTROINTESTINAL: No abdominal or epigastric pain. No nausea, vomiting, or hematemesis; No diarrhea or constipation. No melena or hematochezia.  GENITOURINARY: No dysuria, frequency or hematuria  NEUROLOGICAL: No numbness or weakness  SKIN: No itching, rashes REVIEW OF SYSTEMS:  CONSTITUTIONAL: Endorses some nausea, as well as being very dyspneic  EYES/ENT: Eyes feel dry   NECK: No pain or stiffness  RESPIRATORY: shortness of breath, significant sputum production  CARDIOVASCULAR: No chest pain or palpitations  GASTROINTESTINAL: endorses chronic constipation w/ no BM in 5 days;  GENITOURINARY: Unable to urinate for the past 5 days as well; only some dribbling out  NEUROLOGICAL: No numbness or weakness  SKIN: No itching, rashes

## 2023-05-18 NOTE — H&P ADULT - PROBLEM SELECTOR PROBLEM 6
Head,  normocephalic,  atraumatic,  Face,  Face within normal limits Need for prophylactic measure Hyperkalemia Chronic constipation

## 2023-05-18 NOTE — H&P ADULT - PROBLEM SELECTOR PLAN 1
Patient on AVAPS at home  VBG showed improvement after NIPPV  MICU was consulted in the ED; determined no ICU needs at the time Patient on AVAPS at home  VBG showed improvement after NIPPV  MICU was consulted in the ED; determined no ICU needs at the time    Plan:     Aggressive pulmonary Toilet iso Primary Ciliary Diskinesia Patient on AVAPS at home  VBG showed improvement after NIPPV/AVAPS  MICU was consulted in the ED; determined no ICU needs at the time    Plan:   Duonebs q6h    Aggressive pulmonary Toilet iso Primary Ciliary Diskinesia Patient on AVAPS at home  VBG showed improvement after NIPPV/AVAPS  MICU was consulted in the ED; determined no ICU needs at the time  Patient does not appear overloaded; no diuresis indicated FRANCES    Plan:   Duonebs q6h  AVAPS at night  Aggressive pulmonary Toilet iso Primary Ciliary Diskinesia  Pulm Consulted; appreciate recs

## 2023-05-18 NOTE — CONSULT NOTE ADULT - ASSESSMENT
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 and night time AVAPS w/ frequent episodes of admission due to bronchiectasis, HTN, chronic constipation presenting with lethargy and AMS. MICU consulted for AMS 2/2 acute hypercapnic resp failure    #AMS  -patient w/hx of recurrent hypercapnic respiratory failure   -found to be hypothermic on arrival, A&Ox0, and 3+ pitting edema up to ankles b/l  -labs revealed Na 118, K 5.7, Cl 78; and pCO2 of 107 on VBG, CXR revealing no focal consolidation  -AMS could be iso of acute hypercapnic resp failure from chronic hypercapnic resp failure vs metabolic derangement vs structural abnormalities  -c/w broad spectrum abx and f/u cultures  -c/w AVAPs and f/u with VBG  -begin symbicort 160 BID and home Singulair when able; additionally airway clearance with duonebs q6h, hypersal, aerobika bid, and chest vest bid  -consider pulm cs  -patient had expressed she would not want to be intubated per daughter in law  -f/u with [facility] discussion amongst family and consider palliative cs  -NOT a MICU candidate at this time given [facility]    #Hyponatremia  -likely iso of dehydration vs SIADH vs mixed  -send urine studies, and serum osm      d/w Dr. Clayton

## 2023-05-19 LAB
ALBUMIN SERPL ELPH-MCNC: 3.3 G/DL — SIGNIFICANT CHANGE UP (ref 3.3–5)
ALP SERPL-CCNC: 154 U/L — HIGH (ref 40–120)
ALT FLD-CCNC: 15 U/L — SIGNIFICANT CHANGE UP (ref 4–33)
ANION GAP SERPL CALC-SCNC: 6 MMOL/L — LOW (ref 7–14)
ANION GAP SERPL CALC-SCNC: 6 MMOL/L — LOW (ref 7–14)
ANION GAP SERPL CALC-SCNC: 7 MMOL/L — SIGNIFICANT CHANGE UP (ref 7–14)
ANION GAP SERPL CALC-SCNC: 7 MMOL/L — SIGNIFICANT CHANGE UP (ref 7–14)
AST SERPL-CCNC: 22 U/L — SIGNIFICANT CHANGE UP (ref 4–32)
BASE EXCESS BLDCOV CALC-SCNC: 9 MMOL/L — HIGH (ref -9.3–0.3)
BASOPHILS # BLD AUTO: 0.03 K/UL — SIGNIFICANT CHANGE UP (ref 0–0.2)
BASOPHILS NFR BLD AUTO: 0.3 % — SIGNIFICANT CHANGE UP (ref 0–2)
BILIRUB SERPL-MCNC: 0.2 MG/DL — SIGNIFICANT CHANGE UP (ref 0.2–1.2)
BUN SERPL-MCNC: 8 MG/DL — SIGNIFICANT CHANGE UP (ref 7–23)
BUN SERPL-MCNC: 8 MG/DL — SIGNIFICANT CHANGE UP (ref 7–23)
BUN SERPL-MCNC: 9 MG/DL — SIGNIFICANT CHANGE UP (ref 7–23)
BUN SERPL-MCNC: 9 MG/DL — SIGNIFICANT CHANGE UP (ref 7–23)
CALCIUM SERPL-MCNC: 8.9 MG/DL — SIGNIFICANT CHANGE UP (ref 8.4–10.5)
CALCIUM SERPL-MCNC: 8.9 MG/DL — SIGNIFICANT CHANGE UP (ref 8.4–10.5)
CALCIUM SERPL-MCNC: 9 MG/DL — SIGNIFICANT CHANGE UP (ref 8.4–10.5)
CALCIUM SERPL-MCNC: 9.2 MG/DL — SIGNIFICANT CHANGE UP (ref 8.4–10.5)
CHLORIDE SERPL-SCNC: 84 MMOL/L — LOW (ref 98–107)
CHLORIDE SERPL-SCNC: 85 MMOL/L — LOW (ref 98–107)
CO2 BLDCOV-SCNC: 42 MMOL/L — SIGNIFICANT CHANGE UP
CO2 SERPL-SCNC: 31 MMOL/L — SIGNIFICANT CHANGE UP (ref 22–31)
CO2 SERPL-SCNC: 32 MMOL/L — HIGH (ref 22–31)
CO2 SERPL-SCNC: 33 MMOL/L — HIGH (ref 22–31)
CO2 SERPL-SCNC: 33 MMOL/L — HIGH (ref 22–31)
CREAT SERPL-MCNC: 0.26 MG/DL — LOW (ref 0.5–1.3)
CREAT SERPL-MCNC: 0.26 MG/DL — LOW (ref 0.5–1.3)
CREAT SERPL-MCNC: 0.29 MG/DL — LOW (ref 0.5–1.3)
CREAT SERPL-MCNC: 0.3 MG/DL — LOW (ref 0.5–1.3)
EGFR: 117 ML/MIN/1.73M2 — SIGNIFICANT CHANGE UP
EGFR: 118 ML/MIN/1.73M2 — SIGNIFICANT CHANGE UP
EGFR: 121 ML/MIN/1.73M2 — SIGNIFICANT CHANGE UP
EGFR: 121 ML/MIN/1.73M2 — SIGNIFICANT CHANGE UP
EOSINOPHIL # BLD AUTO: 0.08 K/UL — SIGNIFICANT CHANGE UP (ref 0–0.5)
EOSINOPHIL NFR BLD AUTO: 0.8 % — SIGNIFICANT CHANGE UP (ref 0–6)
GAS PNL BLDCOV: 7.27 — SIGNIFICANT CHANGE UP (ref 7.25–7.45)
GLUCOSE BLDC GLUCOMTR-MCNC: 153 MG/DL — HIGH (ref 70–99)
GLUCOSE BLDC GLUCOMTR-MCNC: 154 MG/DL — HIGH (ref 70–99)
GLUCOSE BLDC GLUCOMTR-MCNC: 163 MG/DL — HIGH (ref 70–99)
GLUCOSE BLDC GLUCOMTR-MCNC: 239 MG/DL — HIGH (ref 70–99)
GLUCOSE SERPL-MCNC: 148 MG/DL — HIGH (ref 70–99)
GLUCOSE SERPL-MCNC: 172 MG/DL — HIGH (ref 70–99)
GLUCOSE SERPL-MCNC: 211 MG/DL — HIGH (ref 70–99)
GLUCOSE SERPL-MCNC: 215 MG/DL — HIGH (ref 70–99)
GRAM STN FLD: SIGNIFICANT CHANGE UP
HCO3 BLDCOV-SCNC: 40 MMOL/L — SIGNIFICANT CHANGE UP
HCT VFR BLD CALC: 32.9 % — LOW (ref 34.5–45)
HGB BLD-MCNC: 10.8 G/DL — LOW (ref 11.5–15.5)
IANC: 7.73 K/UL — HIGH (ref 1.8–7.4)
IMM GRANULOCYTES NFR BLD AUTO: 0.8 % — SIGNIFICANT CHANGE UP (ref 0–0.9)
LACTATE BLDV-MCNC: 0.9 MMOL/L — SIGNIFICANT CHANGE UP (ref 0.5–2)
LYMPHOCYTES # BLD AUTO: 1.2 K/UL — SIGNIFICANT CHANGE UP (ref 1–3.3)
LYMPHOCYTES # BLD AUTO: 11.7 % — LOW (ref 13–44)
MAGNESIUM SERPL-MCNC: 1.5 MG/DL — LOW (ref 1.6–2.6)
MAGNESIUM SERPL-MCNC: 1.7 MG/DL — SIGNIFICANT CHANGE UP (ref 1.6–2.6)
MAGNESIUM SERPL-MCNC: 1.8 MG/DL — SIGNIFICANT CHANGE UP (ref 1.6–2.6)
MAGNESIUM SERPL-MCNC: 2 MG/DL — SIGNIFICANT CHANGE UP (ref 1.6–2.6)
MCHC RBC-ENTMCNC: 26.3 PG — LOW (ref 27–34)
MCHC RBC-ENTMCNC: 32.8 GM/DL — SIGNIFICANT CHANGE UP (ref 32–36)
MCV RBC AUTO: 80.2 FL — SIGNIFICANT CHANGE UP (ref 80–100)
MONOCYTES # BLD AUTO: 1.13 K/UL — HIGH (ref 0–0.9)
MONOCYTES NFR BLD AUTO: 11 % — SIGNIFICANT CHANGE UP (ref 2–14)
MRSA PCR RESULT.: SIGNIFICANT CHANGE UP
NEUTROPHILS # BLD AUTO: 7.73 K/UL — HIGH (ref 1.8–7.4)
NEUTROPHILS NFR BLD AUTO: 75.4 % — SIGNIFICANT CHANGE UP (ref 43–77)
NRBC # BLD: 0 /100 WBCS — SIGNIFICANT CHANGE UP (ref 0–0)
NRBC # FLD: 0 K/UL — SIGNIFICANT CHANGE UP (ref 0–0)
PCO2 BLDCOV: 86 MMHG — HIGH (ref 27–49)
PHOSPHATE SERPL-MCNC: 2.7 MG/DL — SIGNIFICANT CHANGE UP (ref 2.5–4.5)
PHOSPHATE SERPL-MCNC: 2.7 MG/DL — SIGNIFICANT CHANGE UP (ref 2.5–4.5)
PHOSPHATE SERPL-MCNC: 3.2 MG/DL — SIGNIFICANT CHANGE UP (ref 2.5–4.5)
PHOSPHATE SERPL-MCNC: 3.6 MG/DL — SIGNIFICANT CHANGE UP (ref 2.5–4.5)
PLATELET # BLD AUTO: 131 K/UL — LOW (ref 150–400)
PO2 BLDCOA: 75 MMHG — HIGH (ref 17–41)
POTASSIUM SERPL-MCNC: 4.8 MMOL/L — SIGNIFICANT CHANGE UP (ref 3.5–5.3)
POTASSIUM SERPL-MCNC: 4.8 MMOL/L — SIGNIFICANT CHANGE UP (ref 3.5–5.3)
POTASSIUM SERPL-MCNC: 4.9 MMOL/L — SIGNIFICANT CHANGE UP (ref 3.5–5.3)
POTASSIUM SERPL-MCNC: 5.3 MMOL/L — SIGNIFICANT CHANGE UP (ref 3.5–5.3)
POTASSIUM SERPL-SCNC: 4.8 MMOL/L — SIGNIFICANT CHANGE UP (ref 3.5–5.3)
POTASSIUM SERPL-SCNC: 4.8 MMOL/L — SIGNIFICANT CHANGE UP (ref 3.5–5.3)
POTASSIUM SERPL-SCNC: 4.9 MMOL/L — SIGNIFICANT CHANGE UP (ref 3.5–5.3)
POTASSIUM SERPL-SCNC: 5.3 MMOL/L — SIGNIFICANT CHANGE UP (ref 3.5–5.3)
PROT SERPL-MCNC: 6.4 G/DL — SIGNIFICANT CHANGE UP (ref 6–8.3)
RBC # BLD: 4.1 M/UL — SIGNIFICANT CHANGE UP (ref 3.8–5.2)
RBC # FLD: 12.6 % — SIGNIFICANT CHANGE UP (ref 10.3–14.5)
S AUREUS DNA NOSE QL NAA+PROBE: SIGNIFICANT CHANGE UP
SAO2 % BLDCOV: 95.2 % — SIGNIFICANT CHANGE UP
SODIUM SERPL-SCNC: 121 MMOL/L — LOW (ref 135–145)
SODIUM SERPL-SCNC: 123 MMOL/L — LOW (ref 135–145)
SODIUM SERPL-SCNC: 123 MMOL/L — LOW (ref 135–145)
SODIUM SERPL-SCNC: 125 MMOL/L — LOW (ref 135–145)
SPECIMEN SOURCE: SIGNIFICANT CHANGE UP
WBC # BLD: 10.25 K/UL — SIGNIFICANT CHANGE UP (ref 3.8–10.5)
WBC # FLD AUTO: 10.25 K/UL — SIGNIFICANT CHANGE UP (ref 3.8–10.5)

## 2023-05-19 PROCEDURE — 99223 1ST HOSP IP/OBS HIGH 75: CPT | Mod: GC

## 2023-05-19 PROCEDURE — 99233 SBSQ HOSP IP/OBS HIGH 50: CPT | Mod: GC

## 2023-05-19 RX ORDER — MINERAL OIL
133 OIL (ML) MISCELLANEOUS ONCE
Refills: 0 | Status: COMPLETED | OUTPATIENT
Start: 2023-05-19 | End: 2023-05-19

## 2023-05-19 RX ORDER — CIPROFLOXACIN LACTATE 400MG/40ML
750 VIAL (ML) INTRAVENOUS
Refills: 0 | Status: DISCONTINUED | OUTPATIENT
Start: 2023-05-19 | End: 2023-05-22

## 2023-05-19 RX ORDER — MINERAL OIL
133 OIL (ML) MISCELLANEOUS ONCE
Refills: 0 | Status: COMPLETED | OUTPATIENT
Start: 2023-05-19 | End: 2024-04-16

## 2023-05-19 RX ORDER — MEROPENEM 1 G/30ML
1000 INJECTION INTRAVENOUS EVERY 8 HOURS
Refills: 0 | Status: DISCONTINUED | OUTPATIENT
Start: 2023-05-19 | End: 2023-05-21

## 2023-05-19 RX ORDER — SODIUM CHLORIDE 9 MG/ML
500 INJECTION INTRAMUSCULAR; INTRAVENOUS; SUBCUTANEOUS ONCE
Refills: 0 | Status: COMPLETED | OUTPATIENT
Start: 2023-05-19 | End: 2023-05-19

## 2023-05-19 RX ADMIN — KETOTIFEN FUMARATE 1 DROP(S): 0.34 SOLUTION OPHTHALMIC at 18:43

## 2023-05-19 RX ADMIN — KETOTIFEN FUMARATE 1 DROP(S): 0.34 SOLUTION OPHTHALMIC at 06:07

## 2023-05-19 RX ADMIN — PANTOPRAZOLE SODIUM 40 MILLIGRAM(S): 20 TABLET, DELAYED RELEASE ORAL at 06:05

## 2023-05-19 RX ADMIN — Medication 4 MILLILITER(S): at 10:37

## 2023-05-19 RX ADMIN — CEFEPIME 100 MILLIGRAM(S): 1 INJECTION, POWDER, FOR SOLUTION INTRAMUSCULAR; INTRAVENOUS at 14:02

## 2023-05-19 RX ADMIN — Medication 50 MILLIGRAM(S): at 06:05

## 2023-05-19 RX ADMIN — Medication 25 GRAM(S): at 01:18

## 2023-05-19 RX ADMIN — BUDESONIDE AND FORMOTEROL FUMARATE DIHYDRATE 2 PUFF(S): 160; 4.5 AEROSOL RESPIRATORY (INHALATION) at 23:26

## 2023-05-19 RX ADMIN — SODIUM CHLORIDE 1 GRAM(S): 9 INJECTION INTRAMUSCULAR; INTRAVENOUS; SUBCUTANEOUS at 14:02

## 2023-05-19 RX ADMIN — SENNA PLUS 2 TABLET(S): 8.6 TABLET ORAL at 23:24

## 2023-05-19 RX ADMIN — CEFEPIME 100 MILLIGRAM(S): 1 INJECTION, POWDER, FOR SOLUTION INTRAMUSCULAR; INTRAVENOUS at 06:08

## 2023-05-19 RX ADMIN — AMLODIPINE BESYLATE 5 MILLIGRAM(S): 2.5 TABLET ORAL at 06:05

## 2023-05-19 RX ADMIN — SODIUM CHLORIDE 1 GRAM(S): 9 INJECTION INTRAMUSCULAR; INTRAVENOUS; SUBCUTANEOUS at 23:24

## 2023-05-19 RX ADMIN — Medication 2 UNIT(S): at 08:45

## 2023-05-19 RX ADMIN — Medication 250 MILLIGRAM(S): at 06:20

## 2023-05-19 RX ADMIN — ENOXAPARIN SODIUM 30 MILLIGRAM(S): 100 INJECTION SUBCUTANEOUS at 06:05

## 2023-05-19 RX ADMIN — SIMETHICONE 80 MILLIGRAM(S): 80 TABLET, CHEWABLE ORAL at 14:04

## 2023-05-19 RX ADMIN — Medication 1: at 17:39

## 2023-05-19 RX ADMIN — Medication 2: at 08:44

## 2023-05-19 RX ADMIN — SODIUM CHLORIDE 4 MILLILITER(S): 9 INJECTION INTRAMUSCULAR; INTRAVENOUS; SUBCUTANEOUS at 22:40

## 2023-05-19 RX ADMIN — Medication 3 MILLILITER(S): at 16:15

## 2023-05-19 RX ADMIN — Medication 2 UNIT(S): at 12:22

## 2023-05-19 RX ADMIN — MIRTAZAPINE 7.5 MILLIGRAM(S): 45 TABLET, ORALLY DISINTEGRATING ORAL at 23:24

## 2023-05-19 RX ADMIN — Medication 133 MILLILITER(S): at 23:24

## 2023-05-19 RX ADMIN — SODIUM CHLORIDE 4 MILLILITER(S): 9 INJECTION INTRAMUSCULAR; INTRAVENOUS; SUBCUTANEOUS at 10:37

## 2023-05-19 RX ADMIN — POLYETHYLENE GLYCOL 3350 17 GRAM(S): 17 POWDER, FOR SOLUTION ORAL at 12:22

## 2023-05-19 RX ADMIN — Medication 500000 UNIT(S): at 17:39

## 2023-05-19 RX ADMIN — Medication 1: at 12:22

## 2023-05-19 RX ADMIN — Medication 2 UNIT(S): at 17:39

## 2023-05-19 RX ADMIN — Medication 500000 UNIT(S): at 06:05

## 2023-05-19 RX ADMIN — Medication 500000 UNIT(S): at 23:24

## 2023-05-19 RX ADMIN — Medication 3 MILLILITER(S): at 10:37

## 2023-05-19 RX ADMIN — BUDESONIDE AND FORMOTEROL FUMARATE DIHYDRATE 2 PUFF(S): 160; 4.5 AEROSOL RESPIRATORY (INHALATION) at 08:45

## 2023-05-19 RX ADMIN — Medication 3 MILLILITER(S): at 22:41

## 2023-05-19 RX ADMIN — MEROPENEM 100 MILLIGRAM(S): 1 INJECTION INTRAVENOUS at 23:25

## 2023-05-19 RX ADMIN — Medication 750 MILLIGRAM(S): at 18:30

## 2023-05-19 RX ADMIN — Medication 3 MILLILITER(S): at 04:02

## 2023-05-19 RX ADMIN — SODIUM CHLORIDE 250 MILLILITER(S): 9 INJECTION INTRAMUSCULAR; INTRAVENOUS; SUBCUTANEOUS at 14:53

## 2023-05-19 RX ADMIN — Medication 500000 UNIT(S): at 14:04

## 2023-05-19 RX ADMIN — INSULIN GLARGINE 4 UNIT(S): 100 INJECTION, SOLUTION SUBCUTANEOUS at 08:41

## 2023-05-19 RX ADMIN — SODIUM CHLORIDE 1 GRAM(S): 9 INJECTION INTRAMUSCULAR; INTRAVENOUS; SUBCUTANEOUS at 06:05

## 2023-05-19 RX ADMIN — Medication 10 MILLIGRAM(S): at 18:30

## 2023-05-19 NOTE — CONSULT NOTE ADULT - SUBJECTIVE AND OBJECTIVE BOX
HPI:    68 year old Female with hx of primary ciliary dyskinesia, HTN, chronic constipation, bronchiectasis on 4L NC w/ frequent admissions for bronchiectasis () presents with 3 days of AMS and vomiting, found to be hypercapneic by outpatient PCP; recommending going to the ED.  Of note; patient was here from  to  for bronchiectasis exacerbation as well as hyponatremia likely iso SIADH. Patient underwent 5 day course of prednisone, 7 day course of cefepime, and significant pulmonary toilet and was DC'd with AVAPS at home.   Patient is a poor historian and information was also obtained from daughter in law Suki Rodney. Daughter in law reports that since their recent discharge from [facility]; she has been very compliant with her medication and with the AVAPS. However, she reports they consistently get very elevated pCO2 levels despite compliance. Over the past three days, patient reports she has become more dyspneic and has a lot of mucus in her airway. Daughter reports patient appears more confused and altered for the past three days and has had episodes of nausea and vomiting. Patient also endorses difficulty urinating as well as 5 days of constipation. Denies any recent travel to foreign countries, denies any sick contacts, any allergies or any recent fevers or changes in her medications.     ID consulted for further recommendations.     REVIEW OF SYSTEMS  [  ] ROS unobtainable because:    [  ] All other systems negative except as noted below    Constitutional:  [ ] fever [ ] chills  [ ] weight loss  [ ]night sweat  [ ]poor appetite/PO intake [ ]fatigue   Skin:  [ ] rash [ ] phlebitis	  Eyes: [ ] icterus [ ] pain  [ ] discharge	  ENMT: [ ] sore throat  [ ] thrush [ ] ulcers [ ] exudates [ ]anosmia  Respiratory: [ ] dyspnea [ ] hemoptysis [ ] cough [ ] sputum	  Cardiovascular:  [ ] chest pain [ ] palpitations [ ] edema	  Gastrointestinal:  [ ] nausea [ ] vomiting [ ] diarrhea [ ] constipation [ ] pain	  Genitourinary:  [ ] dysuria [ ] frequency [ ] hematuria [ ] discharge [ ] flank pain  [ ] incontinence  Musculoskeletal:  [ ] myalgias [ ] arthralgias [ ] arthritis  [ ] back pain  Neurological:  [ ] headache [ ] weakness [ ] seizures  [ ] confusion/altered mental status    prior hospital charts reviewed [V]  primary team notes reviewed [V]  other consultant notes reviewed [V]    PAST MEDICAL & SURGICAL HISTORY:  ABPA (allergic bronchopulmonary aspergillosis)    Bronchiectasis    Hypertension    Vitamin D deficiency    Microcytic anemia    GERD (gastroesophageal reflux disease)    Postnasal drip    Pseudomonas aeruginosa colonization    Allergic rhinitis    Recurrent pneumonia    Type 2 diabetes mellitus, with long-term current use of insulin    Anxiety and depression    Bronchiectasis with acute exacerbation    History of cholecystectomy    S/P dilatation of esophageal stricture    SOCIAL HISTORY:  - Denied smoking/vaping/alcohol/recreational drug use    FAMILY HISTORY:  Family history of diabetes mellitus  father    Family history of allergic rhinitis (Child, Child)  son, daughter    Family history of bronchitis  mother, father    Allergies  No Known Allergies    ANTIMICROBIALS:  cefepime   IVPB 2000 every 8 hours  cefepime   IVPB    nystatin    Suspension 320416 four times a day    ANTIMICROBIALS (past 90 days):  MEDICATIONS  (STANDING):  cefepime   IVPB   100 mL/Hr IV Intermittent (23 @ 06:08)   100 mL/Hr IV Intermittent (23 @ 21:22)    cefepime   IVPB   100 mL/Hr IV Intermittent (23 @ 16:53)    cefTRIAXone   IVPB   100 mL/Hr IV Intermittent (23 @ 04:56)    nystatin    Suspension   516361 Unit(s) Oral (23 @ 06:05)    vancomycin  IVPB   250 mL/Hr IV Intermittent (23 @ 06:20)   250 mL/Hr IV Intermittent (23 @ 18:04)    OTHER MEDS:   MEDICATIONS  (STANDING):  acetylcysteine 10%  Inhalation 4 daily  albuterol/ipratropium for Nebulization 3 once  albuterol/ipratropium for Nebulization 3 every 6 hours  amLODIPine   Tablet 5 daily  bisacodyl 5 every 12 hours PRN  budesonide 160 MICROgram(s)/formoterol 4.5 MICROgram(s) Inhaler 2 two times a day  dextrose 50% Injectable 12.5 once  dextrose 50% Injectable 25 once  dextrose 50% Injectable 25 once  dextrose Oral Gel 15 once PRN  enoxaparin Injectable 30 every 24 hours  glucagon  Injectable 1 once  insulin glargine Injectable (LANTUS) 4 every morning  insulin lispro (ADMELOG) corrective regimen sliding scale  three times a day before meals  insulin lispro (ADMELOG) corrective regimen sliding scale  at bedtime  insulin lispro Injectable (ADMELOG) 2 three times a day before meals  metoprolol succinate ER 50 daily  mirtazapine 7.5 at bedtime  pantoprazole    Tablet 40 before breakfast  polyethylene glycol 3350 17 daily  senna 2 at bedtime  simethicone 80 daily  sodium chloride 3%  Inhalation 4 every 12 hours    VITALS:  Vital Signs Last 24 Hrs  T(F): 98.2 (23 @ 09:15), Max: 98.5 (23 @ 21:20)    Vital Signs Last 24 Hrs  HR: 89 (23 @ 11:20) (81 - 100)  BP: 142/64 (23 @ 09:15) (142/64 - 160/57)  RR: 18 (23 @ 09:15)  SpO2: 96% (23 @ 11:20) (95% - 100%)  Wt(kg): --    EXAM:  Physical Exam:  Constitutional:  well preserved, comfortable  Head/Eyes: no icterus, PERRL, EOMI  ENT:  supple; no thrush  LUNGS:  CTA  CVS:  normal S1, S2, no murmur  Abd:  soft, non-tender; non-distended  Ext:  no edema  Vascular:  IV site no erythema tenderness or discharge  MSK:  joints without swelling  Neuro: AAO X 3, non- focal    Labs:                        10.8   10.25 )-----------( 131      ( 19 May 2023 06:00 )             32.9         123<L>  |  84<L>  |  8   ----------------------------<  172<H>  4.9   |  33<H>  |  0.29<L>    Ca    9.2      19 May 2023 06:00  Phos  3.2       Mg     2.00         TPro  6.4  /  Alb  3.3  /  TBili  0.2  /  DBili  x   /  AST  22  /  ALT  15  /  AlkPhos  154<H>      WBC Trend:  WBC Count: 10.25 (23 @ 06:00)  WBC Count: 10.11 (23 @ 22:50)    Auto Neutrophil #: 7.73 K/uL (23 @ 06:00)  Auto Neutrophil #: 7.22 K/uL (23 @ 22:50)  Auto Neutrophil #: 9.87 K/uL (23 @ 06:00)  Auto Neutrophil #: 10.50 K/uL (23 @ 09:45)  Auto Neutrophil #: 13.51 K/uL (23 @ 15:20)    Creatine Trend:  Creatinine, Serum: 0.29 ()  Creatinine, Serum: 0.30 ()  Creatinine, Serum: 0.27 ()  Creatinine, Serum: 0.29 ()    Liver Biochemical Testing Trend:  Alanine Aminotransferase (ALT/SGPT): 15 ()  Alanine Aminotransferase (ALT/SGPT): 15 ()  Alanine Aminotransferase (ALT/SGPT): 36 *H* ()  Alanine Aminotransferase (ALT/SGPT): 47 *H* ()  Alanine Aminotransferase (ALT/SGPT): 60 *H* ()  Aspartate Aminotransferase (AST/SGOT): 22 (23 @ 06:00)  Aspartate Aminotransferase (AST/SGOT): 32 (23 @ 22:50)  Aspartate Aminotransferase (AST/SGOT): 24 (23 @ 06:55)  Aspartate Aminotransferase (AST/SGOT): 28 (23 @ 06:30)  Aspartate Aminotransferase (AST/SGOT): 37 (23 @ 05:20)  Bilirubin Total, Serum: 0.2 ()  Bilirubin Total, Serum: 0.3 ()  Bilirubin Total, Serum: 0.2 ()  Bilirubin Total, Serum: 0.2 ()  Bilirubin Total, Serum: 0.2 ()    Trend LDH  22 @ 07:45  152  22 @ 06:38  167  22 @ 14:31  591<H>    Auto Eosinophil %: 0.8 % (23 @ 06:00)  Auto Eosinophil %: 4.0 % (23 @ 22:50)    Urinalysis Basic - ( 18 May 2023 12:38 )    Color: Yellow / Appearance: Slightly Turbid / S.017 / pH: x  Gluc: x / Ketone: Small  / Bili: Negative / Urobili: <2 mg/dL   Blood: x / Protein: 100 mg/dL / Nitrite: Negative   Leuk Esterase: Negative / RBC: 7 /HPF / WBC 4 /HPF   Sq Epi: x / Non Sq Epi: x / Bacteria: Moderate    MICROBIOLOGY:    MRSA PCR Result.: NotDetec (23 @ 14:08)  MRSA PCR Result.: NotDetec (23 @ 14:17)    Culture - Sputum (collected 18 May 2023 18:34)  Source: .Sputum Sputum    Culture - Blood (collected 18 May 2023 00:05)  Source: .Blood Blood-Peripheral  Preliminary Report:    No growth to date.    Culture - Blood (collected 17 May 2023 23:50)  Source: .Blood Blood  Preliminary Report:    No growth to date.    Culture - Sputum (collected 30 Mar 2023 14:15)  Source: .Sputum Sputum  Final Report:    Moderate Pseudomonas aeruginosa    Normal Respiratory Prashanth present  Organism: Pseudomonas aeruginosa  Organism: Pseudomonas aeruginosa    Sensitivities:      Method Type: ELIEZER      -  Amikacin: S <=16      -  Aztreonam: S <=4      -  Cefepime: S <=2      -  Ceftazidime: S <=1      -  Ciprofloxacin: S <=0.25      -  Gentamicin: R >8      -  Levofloxacin: S <=0.5      -  Meropenem: S <=1      -  Piperacillin/Tazobactam: S <=8      -  Tobramycin: R >8    Culture - Blood (collected 29 Mar 2023 15:40)  Source: .Blood PERIPHERAL  Final Report:    No Growth Final    Culture - Blood (collected 29 Mar 2023 15:20)  Source: .Blood  Final Report:    No Growth Final    Culture - Urine (collected 08 Oct 2022 19:16)  Source: Clean Catch Clean Catch (Midstream)  Final Report:    <10,000 CFU/mL Normal Urogenital Prashanth    Culture - Sputum (collected 28 Sep 2022 17:27)  Source: .Sputum Sputum  Final Report:    Numerous Pseudomonas aeruginosa    Normal Respiratory Prashanth present  Organism: Pseudomonas aeruginosa  Organism: Pseudomonas aeruginosa    Sensitivities:      Method Type: ELIEZER      -  Amikacin: S <=16      -  Aztreonam: S <=4      -  Cefepime: S 4      -  Ceftazidime: S <=1      -  Ciprofloxacin: R >2      -  Gentamicin: R >8      -  Imipenem: S 2      -  Levofloxacin: R 4      -  Meropenem: S <=1      -  Piperacillin/Tazobactam: S <=8      -  Tobramycin: R >8    Culture - Urine (collected 28 Sep 2022 09:24)  Source: Clean Catch Clean Catch (Midstream)  Final Report:    No growth    Culture - Sputum, Cystic Fibrosis (collected 2022 16:52)  Source: .Sputum  Final Report:    Few Pseudomonas aeruginosa    Few Klebsiella pneumoniae ESBL    Rare Elizabethkingia meningoseptica    Few Mixed upper respiratory prashanth  Organism: Pseudomonas aeruginosa  Klebsiella pneumoniae ESBL  Elizabethkingia meningoseptica  Organism: Elizabethkingia meningoseptica    Sensitivities:      Method Type: KB      -  Polymyxin B: N/A  Organism: Elizabethkingia meningoseptica    Sensitivities:      Method Type: ETEST      -  Amikacin: R 256      -  Aztreonam: R 256      -  Cefepime: R 256      -  Ciprofloxacin: S 1      -  Gentamicin: R 32      -  Meropenem: R 32      -  Trimethoprim/Sulfamethoxazole: S 0.38      -  Tobramycin: R >256  Organism: Klebsiella pneumoniae ESBL    Sensitivities:      Method Type: ELIEZER      -  Amikacin: I 32      -  Amoxicillin/Clavulanic Acid: R >16/8      -  Ampicillin: R >16 These ampicillin results predict results for amoxicillin      -  Ampicillin/Sulbactam: R >16/8 Enterobacter, Klebsiella aerogenes, Citrobacter, and Serratia may develop resistance during prolonged therapy (3-4 days)      -  Aztreonam: R >16      -  Cefazolin: R >16 Enterobacter, Klebsiella aerogenes, Citrobacter, and Serratia may develop resistance during prolonged therapy (3-4 days)      -  Cefepime: R >16      -  Cefoxitin: S <=8      -  Ceftriaxone: R >32 Enterobacter, Klebsiella aerogenes, Citrobacter, and Serratia may develop resistance during prolonged therapy      -  Ciprofloxacin: R >2      -  Ertapenem: S <=0.5      -  Gentamicin: R >8      -  Imipenem: S <=1      -  Levofloxacin: S <=0.5      -  Meropenem: S <=1      -  Piperacillin/Tazobactam: R 64      -  Tobramycin: R >8      -  Trimethoprim/Sulfamethoxazole: R >2/38  Organism: Pseudomonas aeruginosa    Sensitivities:      Method Type: KB      -  Piperacillin/Tazobactam: S  Organism: Pseudomonas aeruginosa    Sensitivities:      Method Type: ETEST      -  Amikacin: S 3      -  Aztreonam: S 2      -  Cefepime: S 3      -  Ceftazidime: S 0.75      -  Ciprofloxacin: S 0.25      -  Gentamicin: R 256      -  Meropenem: S 0.125      -  Tobramycin: R 192    Rapid RVP Result: NotDetec ( @ 14:35)    Procalcitonin, Serum: 0.05 ()    Troponin T, High Sensitivity Result: 10 ()    Blood Gas Venous - Lactate: 0.9 ( @ 13:22)  Blood Gas Venous - Lactate: 1.0 ( @ 03:10)  Blood Gas Venous - Lactate: 0.6 ( @ 22:50)    A1C with Estimated Average Glucose Result: 8.8 % (23 @ 09:45)    RADIOLOGY:  imaging below personally reviewed      < from: Xray Chest 2 Views PA/Lat (23 @ 00:58) >  FINDINGS:  Heart/Vascular: Heart size is normal.  Pulmonary: No no acute pulmonary pathology. Bilateral extensive   peribronchial thickening worse in the apices unchanged.  Bones: No acute pathology.    Impression:  No focal consolidation.  Bilateral chronic lung disease.        --- End of Report ---    < end of copied text >     HPI:    68 year old Female with hx of primary ciliary dyskinesia, HTN, chronic constipation, bronchiectasis on 4L NC w/ frequent admissions for bronchiectasis () presents with 3 days of AMS and vomiting, found to be hypercapneic by outpatient PCP; recommending going to the ED.  Of note; patient was here from  to  for bronchiectasis exacerbation as well as hyponatremia likely iso SIADH. Patient underwent 5 day course of prednisone, 7 day course of cefepime, and significant pulmonary toilet and was DC'd with AVAPS at home.   Patient is a poor historian and information was also obtained from daughter in law Suki Rodney. Daughter in law reports that since their recent discharge from [facility]; she has been very compliant with her medication and with the AVAPS. However, she reports they consistently get very elevated pCO2 levels despite compliance. Over the past three days, patient reports she has become more dyspneic and has a lot of mucus in her airway. Daughter reports patient appears more confused and altered for the past three days and has had episodes of nausea and vomiting. Patient also endorses difficulty urinating as well as 5 days of constipation. Denies any recent travel to foreign countries, denies any sick contacts, any allergies or any recent fevers or changes in her medications.     ID consulted for further recommendations.     REVIEW OF SYSTEMS  [  ] ROS unobtainable because:    [X] All other systems negative except as noted below    Constitutional:  [ ] fever [ ] chills  [ ] weight loss  [ ]night sweat  [ ]poor appetite/PO intake [ ]fatigue   Skin:  [ ] rash [ ] phlebitis	  Eyes: [ ] icterus [ ] pain  [ ] discharge	  ENMT: [ ] sore throat  [ ] thrush [ ] ulcers [ ] exudates [ ]anosmia  Respiratory: [X] dyspnea [ ] hemoptysis [X] cough [ ] sputum	  Cardiovascular:  [ ] chest pain [ ] palpitations [ ] edema	  Gastrointestinal:  [ ] nausea [ ] vomiting [ ] diarrhea [ ] constipation [ ] pain	  Genitourinary:  [ ] dysuria [ ] frequency [ ] hematuria [ ] discharge [ ] flank pain  [ ] incontinence  Musculoskeletal:  [ ] myalgias [ ] arthralgias [ ] arthritis  [ ] back pain  Neurological:  [ ] headache [ ] weakness [ ] seizures  [ ] confusion/altered mental status    prior hospital charts reviewed [V]  primary team notes reviewed [V]  other consultant notes reviewed [V]    PAST MEDICAL & SURGICAL HISTORY:  ABPA (allergic bronchopulmonary aspergillosis)    Bronchiectasis    Hypertension    Vitamin D deficiency    Microcytic anemia    GERD (gastroesophageal reflux disease)    Postnasal drip    Pseudomonas aeruginosa colonization    Allergic rhinitis    Recurrent pneumonia    Type 2 diabetes mellitus, with long-term current use of insulin    Anxiety and depression    Bronchiectasis with acute exacerbation    History of cholecystectomy    S/P dilatation of esophageal stricture    SOCIAL HISTORY:  - Denied smoking/vaping/alcohol/recreational drug use    FAMILY HISTORY:  Family history of diabetes mellitus  father    Family history of allergic rhinitis (Child, Child)  son, daughter    Family history of bronchitis  mother, father    Allergies  No Known Allergies    ANTIMICROBIALS:  cefepime   IVPB 2000 every 8 hours  cefepime   IVPB    nystatin    Suspension [id number] four times a day    ANTIMICROBIALS (past 90 days):  MEDICATIONS  (STANDING):  cefepime   IVPB   100 mL/Hr IV Intermittent (23 @ 06:08)   100 mL/Hr IV Intermittent (23 @ 21:22)    cefepime   IVPB   100 mL/Hr IV Intermittent (23 @ 16:53)    cefTRIAXone   IVPB   100 mL/Hr IV Intermittent (23 @ 04:56)    nystatin    Suspension   [id number] Unit(s) Oral (23 @ 06:05)    vancomycin  IVPB   250 mL/Hr IV Intermittent (23 @ 06:20)   250 mL/Hr IV Intermittent (23 @ 18:04)    OTHER MEDS:   MEDICATIONS  (STANDING):  acetylcysteine 10%  Inhalation 4 daily  albuterol/ipratropium for Nebulization 3 once  albuterol/ipratropium for Nebulization 3 every 6 hours  amLODIPine   Tablet 5 daily  bisacodyl 5 every 12 hours PRN  budesonide 160 MICROgram(s)/formoterol 4.5 MICROgram(s) Inhaler 2 two times a day  dextrose 50% Injectable 12.5 once  dextrose 50% Injectable 25 once  dextrose 50% Injectable 25 once  dextrose Oral Gel 15 once PRN  enoxaparin Injectable 30 every 24 hours  glucagon  Injectable 1 once  insulin glargine Injectable (LANTUS) 4 every morning  insulin lispro (ADMELOG) corrective regimen sliding scale  three times a day before meals  insulin lispro (ADMELOG) corrective regimen sliding scale  at bedtime  insulin lispro Injectable (ADMELOG) 2 three times a day before meals  metoprolol succinate ER 50 daily  mirtazapine 7.5 at bedtime  pantoprazole    Tablet 40 before breakfast  polyethylene glycol 3350 17 daily  senna 2 at bedtime  simethicone 80 daily  sodium chloride 3%  Inhalation 4 every 12 hours    VITALS:  Vital Signs Last 24 Hrs  T(F): 98.2 (23 @ 09:15), Max: 98.5 (23 @ 21:20)    Vital Signs Last 24 Hrs  HR: 89 (23 @ 11:20) (81 - 100)  BP: 142/64 (23 @ 09:15) (142/64 - 160/57)  RR: 18 (23 @ 09:15)  SpO2: 96% (23 @ 11:20) (95% - 100%)  Wt(kg): --    EXAM:  Physical Exam:  Constitutional:  tachypneic, using accessory muscles   Head/Eyes: no icterus, PERRL, EOMI  ENT:  supple; no thrush  LUNGS:  bilateral crackles, tachypneic, using accessory muscles  CVS:  normal S1, S2, no murmur  Abd:  soft, non-tender; non-distended  Ext:  LLE edema  Vascular:  IV site no erythema tenderness or discharge  MSK:  joints without swelling  Neuro: AAO X 3, non- focal    Labs:                        10.8   10.25 )-----------( 131      ( 19 May 2023 06:00 )             32.9         123<L>  |  84<L>  |  8   ----------------------------<  172<H>  4.9   |  33<H>  |  0.29<L>    Ca    9.2      19 May 2023 06:00  Phos  3.2       Mg     2.00         TPro  6.4  /  Alb  3.3  /  TBili  0.2  /  DBili  x   /  AST  22  /  ALT  15  /  AlkPhos  154<H>      WBC Trend:  WBC Count: 10.25 (23 @ 06:00)  WBC Count: 10.11 (23 @ 22:50)    Auto Neutrophil #: 7.73 K/uL (23 @ 06:00)  Auto Neutrophil #: 7.22 K/uL (23 @ 22:50)  Auto Neutrophil #: 9.87 K/uL (23 @ 06:00)  Auto Neutrophil #: 10.50 K/uL (23 @ 09:45)  Auto Neutrophil #: 13.51 K/uL (23 @ 15:20)    Creatine Trend:  Creatinine, Serum: 0.29 ()  Creatinine, Serum: 0.30 ()  Creatinine, Serum: 0.27 ()  Creatinine, Serum: 0.29 ()    Liver Biochemical Testing Trend:  Alanine Aminotransferase (ALT/SGPT): 15 ()  Alanine Aminotransferase (ALT/SGPT): 15 ()  Alanine Aminotransferase (ALT/SGPT): 36 *H* ()  Alanine Aminotransferase (ALT/SGPT): 47 *H* ()  Alanine Aminotransferase (ALT/SGPT): 60 *H* ()  Aspartate Aminotransferase (AST/SGOT): 22 (23 @ 06:00)  Aspartate Aminotransferase (AST/SGOT): 32 (23 @ 22:50)  Aspartate Aminotransferase (AST/SGOT): 24 (23 @ 06:55)  Aspartate Aminotransferase (AST/SGOT): 28 (23 @ 06:30)  Aspartate Aminotransferase (AST/SGOT): 37 (23 @ 05:20)  Bilirubin Total, Serum: 0.2 ()  Bilirubin Total, Serum: 0.3 ()  Bilirubin Total, Serum: 0.2 ()  Bilirubin Total, Serum: 0.2 ()  Bilirubin Total, Serum: 0.2 ()    Trend LDH  22 @ 07:45  152  22 @ 06:38  167  22 @ 14:31  591<H>    Auto Eosinophil %: 0.8 % (23 @ 06:00)  Auto Eosinophil %: 4.0 % (23 @ 22:50)    Urinalysis Basic - ( 18 May 2023 12:38 )    Color: Yellow / Appearance: Slightly Turbid / S.017 / pH: x  Gluc: x / Ketone: Small  / Bili: Negative / Urobili: <2 mg/dL   Blood: x / Protein: 100 mg/dL / Nitrite: Negative   Leuk Esterase: Negative / RBC: 7 /HPF / WBC 4 /HPF   Sq Epi: x / Non Sq Epi: x / Bacteria: Moderate    MICROBIOLOGY:    MRSA PCR Result.: NotDetec (23 @ 14:08)  MRSA PCR Result.: NotDetec (23 @ 14:17)    Culture - Sputum (collected 18 May 2023 18:34)  Source: .Sputum Sputum    Culture - Blood (collected 18 May 2023 00:05)  Source: .Blood Blood-Peripheral  Preliminary Report:    No growth to date.    Culture - Blood (collected 17 May 2023 23:50)  Source: .Blood Blood  Preliminary Report:    No growth to date.    Culture - Sputum (collected 30 Mar 2023 14:15)  Source: .Sputum Sputum  Final Report:    Moderate Pseudomonas aeruginosa    Normal Respiratory Prashanth present  Organism: Pseudomonas aeruginosa  Organism: Pseudomonas aeruginosa    Sensitivities:      Method Type: ELIEZER      -  Amikacin: S <=16      -  Aztreonam: S <=4      -  Cefepime: S <=2      -  Ceftazidime: S <=1      -  Ciprofloxacin: S <=0.25      -  Gentamicin: R >8      -  Levofloxacin: S <=0.5      -  Meropenem: S <=1      -  Piperacillin/Tazobactam: S <=8      -  Tobramycin: R >8    Culture - Blood (collected 29 Mar 2023 15:40)  Source: .Blood PERIPHERAL  Final Report:    No Growth Final    Culture - Blood (collected 29 Mar 2023 15:20)  Source: .Blood  Final Report:    No Growth Final    Culture - Urine (collected 08 Oct 2022 19:16)  Source: Clean Catch Clean Catch (Midstream)  Final Report:    <10,000 CFU/mL Normal Urogenital Prashanth    Culture - Sputum (collected 28 Sep 2022 17:27)  Source: .Sputum Sputum  Final Report:    Numerous Pseudomonas aeruginosa    Normal Respiratory Prashanth present  Organism: Pseudomonas aeruginosa  Organism: Pseudomonas aeruginosa    Sensitivities:      Method Type: ELIEZER      -  Amikacin: S <=16      -  Aztreonam: S <=4      -  Cefepime: S 4      -  Ceftazidime: S <=1      -  Ciprofloxacin: R >2      -  Gentamicin: R >8      -  Imipenem: S 2      -  Levofloxacin: R 4      -  Meropenem: S <=1      -  Piperacillin/Tazobactam: S <=8      -  Tobramycin: R >8    Culture - Urine (collected 28 Sep 2022 09:24)  Source: Clean Catch Clean Catch (Midstream)  Final Report:    No growth    Culture - Sputum, Cystic Fibrosis (collected 2022 16:52)  Source: .Sputum  Final Report:    Few Pseudomonas aeruginosa    Few Klebsiella pneumoniae ESBL    Rare Elizabethkingia meningoseptica    Few Mixed upper respiratory prashanth  Organism: Pseudomonas aeruginosa  Klebsiella pneumoniae ESBL  Elizabethkingia meningoseptica  Organism: Elizabethkingia meningoseptica    Sensitivities:      Method Type: KB      -  Polymyxin B: N/A  Organism: Elizabethkingia meningoseptica    Sensitivities:      Method Type: ETEST      -  Amikacin: R 256      -  Aztreonam: R 256      -  Cefepime: R 256      -  Ciprofloxacin: S 1      -  Gentamicin: R 32      -  Meropenem: R 32      -  Trimethoprim/Sulfamethoxazole: S 0.38      -  Tobramycin: R >256  Organism: Klebsiella pneumoniae ESBL    Sensitivities:      Method Type: ELIEZER      -  Amikacin: I 32      -  Amoxicillin/Clavulanic Acid: R >16/8      -  Ampicillin: R >16 These ampicillin results predict results for amoxicillin      -  Ampicillin/Sulbactam: R >16/8 Enterobacter, Klebsiella aerogenes, Citrobacter, and Serratia may develop resistance during prolonged therapy (3-4 days)      -  Aztreonam: R >16      -  Cefazolin: R >16 Enterobacter, Klebsiella aerogenes, Citrobacter, and Serratia may develop resistance during prolonged therapy (3-4 days)      -  Cefepime: R >16      -  Cefoxitin: S <=8      -  Ceftriaxone: R >32 Enterobacter, Klebsiella aerogenes, Citrobacter, and Serratia may develop resistance during prolonged therapy      -  Ciprofloxacin: R >2      -  Ertapenem: S <=0.5      -  Gentamicin: R >8      -  Imipenem: S <=1      -  Levofloxacin: S <=0.5      -  Meropenem: S <=1      -  Piperacillin/Tazobactam: R 64      -  Tobramycin: R >8      -  Trimethoprim/Sulfamethoxazole: R >2/38  Organism: Pseudomonas aeruginosa    Sensitivities:      Method Type: KB      -  Piperacillin/Tazobactam: S  Organism: Pseudomonas aeruginosa    Sensitivities:      Method Type: ETEST      -  Amikacin: S 3      -  Aztreonam: S 2      -  Cefepime: S 3      -  Ceftazidime: S 0.75      -  Ciprofloxacin: S 0.25      -  Gentamicin: R 256      -  Meropenem: S 0.125      -  Tobramycin: R 192    Rapid RVP Result: NotDetec ( @ 14:35)    Procalcitonin, Serum: 0.05 ()    Troponin T, High Sensitivity Result: 10 ()    Blood Gas Venous - Lactate: 0.9 ( @ 13:22)  Blood Gas Venous - Lactate: 1.0 ( @ 03:10)  Blood Gas Venous - Lactate: 0.6 ( @ 22:50)    A1C with Estimated Average Glucose Result: 8.8 % (23 @ 09:45)    RADIOLOGY:  imaging below personally reviewed      < from: Xray Chest 2 Views PA/Lat (23 @ 00:58) >  FINDINGS:  Heart/Vascular: Heart size is normal.  Pulmonary: No no acute pulmonary pathology. Bilateral extensive   peribronchial thickening worse in the apices unchanged.  Bones: No acute pathology.    Impression:  No focal consolidation.  Bilateral chronic lung disease.        --- End of Report ---    < end of copied text >

## 2023-05-19 NOTE — PROGRESS NOTE ADULT - SUBJECTIVE AND OBJECTIVE BOX
CHIEF COMPLAINT: Hypercapnic Resp Failure    Interval Events: Reports feeling about the same, receiving nebulizer treatment.    REVIEW OF SYSTEMS:  Constitutional: No fevers or chills.   Eyes: No itching or discharge from the eyes  ENT: No ear pain. No ear discharge. No nasal congestion.   CV: No chest pain. No palpitations. No lightheadedness or dizziness.   Resp: +Dyspnea, cough  GI: No nausea. No vomiting. No diarrhea.  MSK: No joint pain or pain in any extremities  Integumentary: No skin lesions. No pedal edema.  Neurological: No gross motor weakness. No sensory changes.  [x] All other systems negative  [ ] Unable to assess ROS because ________    OBJECTIVE:  ICU Vital Signs Last 24 Hrs  T(C): 36.8 (19 May 2023 09:15), Max: 36.9 (18 May 2023 21:20)  T(F): 98.2 (19 May 2023 09:15), Max: 98.5 (18 May 2023 21:20)  HR: 89 (19 May 2023 11:20) (81 - 100)  BP: 142/64 (19 May 2023 09:15) (142/64 - 160/57)  BP(mean): --  ABP: --  ABP(mean): --  RR: 18 (19 May 2023 09:15) (18 - 20)  SpO2: 96% (19 May 2023 11:20) (95% - 100%)    O2 Parameters below as of 19 May 2023 10:37  Patient On (Oxygen Delivery Method): nasal cannula    Mode: standby     @ 07:01  -   @ 07:00  --------------------------------------------------------  IN: 0 mL / OUT: 1120 mL / NET: -1120 mL    CAPILLARY BLOOD GLUCOSE    POCT Blood Glucose.: 163 mg/dL (19 May 2023 11:52)    PHYSICAL EXAM:  General: Awake, alert, oriented X 3.   HEENT: Atraumatic, normocephalic.   Lymph Nodes: No palpable lymphadenopathy  Neck: Supple   Respiratory: Normal chest expansion. Coarse breath sounds  Cardiovascular: S1 S2 normal. No murmurs, rubs or gallops.   Abdomen: Soft, non-tender, non-distended. No organomegaly.  Extremities: Warm to touch. Peripheral pulse palpable.  Skin: No rashes or skin lesions  Neurological: Motor and sensory examination equal and normal in all four extremities.  Psychiatry: Appropriate mood and affect.    HOSPITAL MEDICATIONS:  MEDICATIONS  (STANDING):  acetylcysteine 10%  Inhalation 4 milliLiter(s) Inhalation daily  albuterol/ipratropium for Nebulization 3 milliLiter(s) Nebulizer once  albuterol/ipratropium for Nebulization 3 milliLiter(s) Nebulizer every 6 hours  amLODIPine   Tablet 5 milliGRAM(s) Oral daily  budesonide 160 MICROgram(s)/formoterol 4.5 MICROgram(s) Inhaler 2 Puff(s) Inhalation two times a day  cefepime   IVPB 2000 milliGRAM(s) IV Intermittent every 8 hours  cefepime   IVPB      dextrose 5%. 1000 milliLiter(s) (100 mL/Hr) IV Continuous <Continuous>  dextrose 5%. 1000 milliLiter(s) (50 mL/Hr) IV Continuous <Continuous>  dextrose 50% Injectable 12.5 Gram(s) IV Push once  dextrose 50% Injectable 25 Gram(s) IV Push once  dextrose 50% Injectable 25 Gram(s) IV Push once  enoxaparin Injectable 30 milliGRAM(s) SubCutaneous every 24 hours  glucagon  Injectable 1 milliGRAM(s) IntraMuscular once  insulin glargine Injectable (LANTUS) 4 Unit(s) SubCutaneous every morning  insulin lispro (ADMELOG) corrective regimen sliding scale   SubCutaneous three times a day before meals  insulin lispro (ADMELOG) corrective regimen sliding scale   SubCutaneous at bedtime  insulin lispro Injectable (ADMELOG) 2 Unit(s) SubCutaneous three times a day before meals  ketotifen 0.025% Ophthalmic Solution 1 Drop(s) Both EYES two times a day  metoprolol succinate ER 50 milliGRAM(s) Oral daily  mirtazapine 7.5 milliGRAM(s) Oral at bedtime  nystatin    Suspension 866287 Unit(s) Oral four times a day  pantoprazole    Tablet 40 milliGRAM(s) Oral before breakfast  polyethylene glycol 3350 17 Gram(s) Oral daily  senna 2 Tablet(s) Oral at bedtime  simethicone 80 milliGRAM(s) Chew daily  sodium chloride 1 Gram(s) Oral three times a day  sodium chloride 3%  Inhalation 4 milliLiter(s) Inhalation every 12 hours  vancomycin  IVPB 750 milliGRAM(s) IV Intermittent every 12 hours    MEDICATIONS  (PRN):  bisacodyl 5 milliGRAM(s) Oral every 12 hours PRN Constipation  dextrose Oral Gel 15 Gram(s) Oral once PRN Blood Glucose LESS THAN 70 milliGRAM(s)/deciliter  fluticasone propionate 50 MICROgram(s)/spray Nasal Spray 1 Spray(s) Both Nostrils two times a day PRN nasal congestion      LABS:                        10.8   10.25 )-----------( 131      ( 19 May 2023 06:00 )             32.9         123<L>  |  84<L>  |  8   ----------------------------<  172<H>  4.9   |  33<H>  |  0.29<L>    Ca    9.2      19 May 2023 06:00  Phos  3.2       Mg     2.00         TPro  6.4  /  Alb  3.3  /  TBili  0.2  /  DBili  x   /  AST  22  /  ALT  15  /  AlkPhos  154<H>        Urinalysis Basic - ( 18 May 2023 12:38 )    Color: Yellow / Appearance: Slightly Turbid / S.017 / pH: x  Gluc: x / Ketone: Small  / Bili: Negative / Urobili: <2 mg/dL   Blood: x / Protein: 100 mg/dL / Nitrite: Negative   Leuk Esterase: Negative / RBC: 7 /HPF / WBC 4 /HPF   Sq Epi: x / Non Sq Epi: x / Bacteria: Moderate        Venous Blood Gas:   @ 03:10  7.31/75/132/38/98.5  VBG Lactate: 1.0  Venous Blood Gas:   @ 22:50  7.20/107/60/42/88.2  VBG Lactate: 0.6      MICROBIOLOGY:     RADIOLOGY:  [ ] Reviewed and interpreted by me    Point of Care Ultrasound Findings:    PFT:    EKG:

## 2023-05-19 NOTE — PROGRESS NOTE ADULT - PROBLEM SELECTOR PLAN 8
Diet: CC/DASH no beef/pork  Psych/Sleep: None FRANCES  GI: Protonix  DVT ppx: Lovenox 40mg   Code Status: Pending further discusision  Dispo: Pending resolution of hyponatremia and hypercapnia

## 2023-05-19 NOTE — CONSULT NOTE ADULT - ASSESSMENT
68 year old Female with hx of primary ciliary dyskinesia, HTN, chronic constipation, bronchiectasis on 4L NC w/ frequent admissions for bronchiectasis (4/22) presents with 3 days of AMS and vomiting, found to be hypercapneic by outpatient PCP, sent to ER.    Hypothermic 95.6 F  No leukocytosis   on 4 L NC    Workup:   UA (5/18): 4 WBC, moderate bacteria, neg LE and Nitrite   Sputum Cx (5/18): GNR, GPR, Gram variable cocci, epithelial cells  RVP neg   CXR: Bilateral extensive peribronchial thickening worse in the apices unchanged.    MICU was consulted for potential intubation, but patient's hypercapnia improved on AVAPS while in the ED    Antimicrobials:  Ceftriaxone 5/18  Cefepime 5/18-->    #Acute hypoxic hypercapnic resp failure, bronchiectasis exacerbation     Recommendations:       PT TO BE SEEN. PRELIM NOTE  PENDING RECS. PLEASE WAIT FOR FINAL RECS AFTER DISCUSSION WITH ATTENDING#           68 year old Female with hx of primary ciliary dyskinesia, HTN, chronic constipation, bronchiectasis on 4L NC w/ frequent admissions for bronchiectasis (4/22) presents with 3 days of AMS and vomiting, found to be hypercapneic by outpatient PCP, sent to ER.    Hypothermic 95.6 F  No leukocytosis   on 4 L NC    Workup:   UA (5/18): 4 WBC, moderate bacteria, neg LE and Nitrite   Sputum Cx (5/18): GNR, GPR, Gram variable cocci, epithelial cells  RVP neg   CXR: Bilateral extensive peribronchial thickening worse in the apices unchanged.    MICU was consulted for potential intubation, but patient's hypercapnia improved on AVAPS while in the ED    Antimicrobials:  Ceftriaxone 5/18  Cefepime 5/18-->    #Acute hypoxic hypercapnic resp failure, bronchiectasis exacerbation, previous sputum Cx with Klebsiella ESBL,Elizabethkingia, Pseudomonas,      Recommendations:   Start Meropenem 1 g IV q 8hrs with Ciprofloxacin   Discontinue Cefepime  F/up final sputum Cx  Pulm following       PT TO BE SEEN. PRELIM NOTE  PENDING RECS. PLEASE WAIT FOR FINAL RECS AFTER DISCUSSION WITH ATTENDING#         68 year old Female with hx of primary ciliary dyskinesia, HTN, chronic constipation, bronchiectasis on 4L NC w/ frequent admissions for bronchiectasis (4/22) presents with 3 days of AMS and vomiting, found to be hypercapneic by outpatient PCP, sent to ER.    Hypothermic 95.6 F  No leukocytosis   on 4 L NC    Workup:   UA (5/18): 4 WBC, moderate bacteria, neg LE and Nitrite   Sputum Cx (5/18): GNR, GPR, Gram variable cocci, epithelial cells  RVP neg   CXR: Bilateral extensive peribronchial thickening worse in the apices unchanged.    MICU was consulted for potential intubation, but patient's hypercapnia improved on AVAPS while in the ED    Antimicrobials:  Ceftriaxone 5/18  Cefepime 5/18-->    #Acute hypoxic hypercapnic resp failure, bronchiectasis exacerbation, previous sputum Cx with Klebsiella ESBL, Elizabethkingia, Pseudomonas,      Recommendations:   Start Meropenem 1 g IV q 8hrs   Ciprofloxacin 400 mg IV q 12hrs   Discontinue Cefepime  F/up final sputum Cx  Pulm following     Seen and discussed with Dr Yaneth Villanueva MD, PGY4  ID fellow  [employer] Preferred  After 5pm/weekends call [phone]           68 year old Female with hx of primary ciliary dyskinesia, HTN, chronic constipation, bronchiectasis on 4L NC w/ frequent admissions for bronchiectasis (4/22) presents with 3 days of AMS and vomiting, found to be hypercapneic by outpatient PCP, sent to ER.    Hypothermic 95.6 F  No leukocytosis   on 4 L NC    Workup:   UA (5/18): 4 WBC, moderate bacteria, neg LE and Nitrite   Sputum Cx (5/18): GNR, GPR, Gram variable cocci, epithelial cells  RVP neg   CXR: Bilateral extensive peribronchial thickening worse in the apices unchanged.    MICU was consulted for potential intubation, but patient's hypercapnia improved on AVAPS while in the ED    Antimicrobials:  Ceftriaxone 5/18  Cefepime 5/18-->    #Acute hypoxic hypercapnic resp failure, bronchiectasis exacerbation, previous sputum Cx with Klebsiella ESBL, Elizabethkingia, Pseudomonas,      Recommendations:   Start Meropenem 1 g IV q 8hrs   Ciprofloxacin 750 mg po q 12hrs   Discontinue Cefepime  F/up final sputum Cx  Pulm following     Seen and discussed with Dr Yaneth Villanueva MD, PGY4  ID fellow  [employer] Teams Preferred  After 5pm/weekends call [phone]

## 2023-05-19 NOTE — PATIENT PROFILE ADULT - FUNCTIONAL ASSESSMENT - BASIC MOBILITY 6.
3-calculated by average/Not able to assess (calculate score using [facility] averaging method)

## 2023-05-19 NOTE — PROGRESS NOTE ADULT - PROBLEM SELECTOR PLAN 1
Patient on AVAPS at home  VBG showed improvement after NIPPV/AVAPS  MICU was consulted in the ED; determined no ICU needs at the time  Patient does not appear overloaded; no diuresis indicated FRANCES    Plan:   Duonebs q6h  AVAPS at night  Aggressive pulmonary Toilet iso Primary Ciliary Diskinesia    Appreciate Pulm Recs  - Aggressive pulmonary Toilet  - Infectious workup per ID    Appreciate ID Recs:  - Meropenem 1g IV q8h  - [id number] mg po q12h  - F.U Sputum Cultures

## 2023-05-19 NOTE — PROGRESS NOTE ADULT - ASSESSMENT
68F with hx of bronchiectasis iso primary ciliary diskinesia on home AVAPS and home o2 is here today for acute on chronic hypoxemic hypercapneic respiratory failure in the setting of suspected pna.

## 2023-05-19 NOTE — PROGRESS NOTE ADULT - SUBJECTIVE AND OBJECTIVE BOX
INCOMPLETE NOTE    Demarcus Erin | PGY1| Pager: [place] [phone]) -- [place] ([phone])  Interval Events:    REVIEW OF SYSTEMS:  CONSTITUTIONAL: No weakness, fevers or chills  EYES/ENT: No visual changes;  No vertigo or throat pain   NECK: No pain or stiffness  RESPIRATORY: No cough, wheezing, hemoptysis; No shortness of breath  CARDIOVASCULAR: No chest pain or palpitations  GASTROINTESTINAL: No abdominal or epigastric pain. No nausea, vomiting, or hematemesis; No diarrhea or constipation. No melena or hematochezia.  GENITOURINARY: No dysuria, frequency or hematuria  NEUROLOGICAL: No numbness or weakness  SKIN: No itching, burning, rashes, or lesions   All other review of systems is negative unless indicated above.    OBJECTIVE:  ICU Vital Signs Last 24 Hrs  T(C): 36.4 (19 May 2023 01:15), Max: 36.9 (18 May 2023 21:20)  T(F): 97.6 (19 May 2023 01:15), Max: 98.5 (18 May 2023 21:20)  HR: 95 (19 May 2023 04:03) (80 - 96)  BP: 159/60 (19 May 2023 01:15) (148/68 - 160/57)  BP(mean): --  ABP: --  ABP(mean): --  RR: 19 (19 May 2023 01:15) (19 - 20)  SpO2: 100% (19 May 2023 04:03) (95% - 100%)    O2 Parameters below as of 19 May 2023 04:02  Patient On (Oxygen Delivery Method): BiPAP/CPAP, AVAPS          Mode: NIV (Noninvasive Ventilation), RR (machine): 18, TV (machine): 450, FiO2: 35, PS: 5, ITime: 0.9, P-High: 25, P-Low: 15, PIP: 20    05-18 @ 07:01  -  05-19 @ 06:54  --------------------------------------------------------  IN: 0 mL / OUT: 420 mL / NET: -420 mL      CAPILLARY BLOOD GLUCOSE      POCT Blood Glucose.: 231 mg/dL (18 May 2023 21:49)      PHYSICAL EXAM:  General: WN/WD NAD  Neurology: A&Ox3, nonfocal, GARCIA x 4  Eyes: PERRLA/ EOMI, Gross vision intact  ENT/Neck: Neck supple, trachea midline, No JVD, Gross hearing intact  Respiratory: CTA B/L, No wheezing, rales, rhonchi  CV: RRR, +S1/S2, -S3/S4, no murmurs, rubs or gallops  Abdominal: Soft, NT, ND +BS, No HSM  MSK: 5/5 strength UE/LE bilaterally  Extremities: No edema, 2+ peripheral pulses  Skin: No Rashes, Hematoma, Ecchymosis  Incisions:   Tubes:    HOSPITAL MEDICATIONS:  MEDICATIONS  (STANDING):  acetylcysteine 10%  Inhalation 4 milliLiter(s) Inhalation daily  albuterol/ipratropium for Nebulization 3 milliLiter(s) Nebulizer once  albuterol/ipratropium for Nebulization 3 milliLiter(s) Nebulizer every 6 hours  amLODIPine   Tablet 5 milliGRAM(s) Oral daily  budesonide 160 MICROgram(s)/formoterol 4.5 MICROgram(s) Inhaler 2 Puff(s) Inhalation two times a day  cefepime   IVPB 2000 milliGRAM(s) IV Intermittent every 8 hours  cefepime   IVPB      dextrose 5%. 1000 milliLiter(s) (100 mL/Hr) IV Continuous <Continuous>  dextrose 5%. 1000 milliLiter(s) (50 mL/Hr) IV Continuous <Continuous>  dextrose 50% Injectable 12.5 Gram(s) IV Push once  dextrose 50% Injectable 25 Gram(s) IV Push once  dextrose 50% Injectable 25 Gram(s) IV Push once  enoxaparin Injectable 30 milliGRAM(s) SubCutaneous every 24 hours  glucagon  Injectable 1 milliGRAM(s) IntraMuscular once  insulin glargine Injectable (LANTUS) 4 Unit(s) SubCutaneous every morning  insulin lispro (ADMELOG) corrective regimen sliding scale   SubCutaneous three times a day before meals  insulin lispro (ADMELOG) corrective regimen sliding scale   SubCutaneous at bedtime  insulin lispro Injectable (ADMELOG) 2 Unit(s) SubCutaneous three times a day before meals  ketotifen 0.025% Ophthalmic Solution 1 Drop(s) Both EYES two times a day  metoprolol succinate ER 50 milliGRAM(s) Oral daily  mirtazapine 7.5 milliGRAM(s) Oral at bedtime  nystatin    Suspension 438493 Unit(s) Oral four times a day  pantoprazole    Tablet 40 milliGRAM(s) Oral before breakfast  polyethylene glycol 3350 17 Gram(s) Oral daily  senna 2 Tablet(s) Oral at bedtime  simethicone 80 milliGRAM(s) Chew daily  sodium chloride 1 Gram(s) Oral three times a day  sodium chloride 3%  Inhalation 4 milliLiter(s) Inhalation every 12 hours  vancomycin  IVPB 750 milliGRAM(s) IV Intermittent every 12 hours    MEDICATIONS  (PRN):  bisacodyl 5 milliGRAM(s) Oral every 12 hours PRN Constipation  dextrose Oral Gel 15 Gram(s) Oral once PRN Blood Glucose LESS THAN 70 milliGRAM(s)/deciliter  fluticasone propionate 50 MICROgram(s)/spray Nasal Spray 1 Spray(s) Both Nostrils two times a day PRN nasal congestion      LABS:                        10.8   10.25 )-----------( 131      ( 19 May 2023 06:00 )             32.9     Hgb Trend: 10.8<--, 11.9<--  05-19    121<L>  |  84<L>  |  9   ----------------------------<  211<H>  5.3   |  31  |  0.30<L>    Ca    8.9      19 May 2023 01:05  Phos  3.6       Mg     1.50         TPro  7.0  /  Alb  3.5  /  TBili  0.3  /  DBili  x   /  AST  32  /  ALT  15  /  AlkPhos  185<H>  17    Creatinine Trend: 0.30<--, 0.27<--, 0.29<--, 0.35<--, 0.27<--    Urinalysis Basic - ( 18 May 2023 12:38 )    Color: Yellow / Appearance: Slightly Turbid / S.017 / pH: x  Gluc: x / Ketone: Small  / Bili: Negative / Urobili: <2 mg/dL   Blood: x / Protein: 100 mg/dL / Nitrite: Negative   Leuk Esterase: Negative / RBC: 7 /HPF / WBC 4 /HPF   Sq Epi: x / Non Sq Epi: x / Bacteria: Moderate        Venous Blood Gas:   @ 03:10  7.31/75/132/38/98.5  VBG Lactate: 1.0  Venous Blood Gas:   @ 22:50  7.20/107/60/42/88.2  VBG Lactate: 0.6      MICROBIOLOGY:     Culture - Sputum (collected 18 May 2023 18:34)  Source: .Sputum Sputum  Gram Stain (19 May 2023 05:43):    Moderate polymorphonuclear leukocytes per low power field    Few Squamous epithelial cells per low power field    Moderate Gram Positive Rods seen per oil power field    Moderate Gram Negative Rods seen per oil power field    Moderate Gram Variable Cocci seen per oil power field    Rare Yeast like cells seen per oil power field           Demarcus Erin | PGY1| Pager: [place] [phone]) -- [place] ([phone])  Interval Events: NAEON; patient reports she feels she is still short of breath.    REVIEW OF SYSTEMS:  CONSTITUTIONAL: No weakness, fevers or chills  EYES/ENT: No visual changes;  No vertigo or throat pain   NECK: No pain or stiffness  RESPIRATORY: No cough, wheezing, hemoptysis; No shortness of breath  CARDIOVASCULAR: No chest pain or palpitations  GASTROINTESTINAL: No abdominal or epigastric pain. No nausea, vomiting, or hematemesis; No diarrhea or constipation. No melena or hematochezia.  GENITOURINARY: No dysuria, frequency or hematuria  NEUROLOGICAL: No numbness or weakness  SKIN: No itching, burning, rashes, or lesions   All other review of systems is negative unless indicated above.    OBJECTIVE:  ICU Vital Signs Last 24 Hrs  T(C): 36.4 (19 May 2023 01:15), Max: 36.9 (18 May 2023 21:20)  T(F): 97.6 (19 May 2023 01:15), Max: 98.5 (18 May 2023 21:20)  HR: 95 (19 May 2023 04:03) (80 - 96)  BP: 159/60 (19 May 2023 01:15) (148/68 - 160/57)  BP(mean): --  ABP: --  ABP(mean): --  RR: 19 (19 May 2023 01:15) (19 - 20)  SpO2: 100% (19 May 2023 04:03) (95% - 100%)    O2 Parameters below as of 19 May 2023 04:02  Patient On (Oxygen Delivery Method): BiPAP/CPAP, AVAPS          Mode: NIV (Noninvasive Ventilation), RR (machine): 18, TV (machine): 450, FiO2: 35, PS: 5, ITime: 0.9, P-High: 25, P-Low: 15, PIP: 20    05-18 @ 07:01  -  - @ 06:54  --------------------------------------------------------  IN: 0 mL / OUT: 420 mL / NET: -420 mL      CAPILLARY BLOOD GLUCOSE      POCT Blood Glucose.: 231 mg/dL (18 May 2023 21:49)      PHYSICAL EXAM:  General: WN/WD NAD  Neurology: A&Ox3, nonfocal, GARCIA x 4  Eyes: PERRLA/ EOMI, Gross vision intact  ENT/Neck: Neck supple, trachea midline, No JVD, Gross hearing intact  Respiratory: CTA B/L, No wheezing, rales, rhonchi  CV: RRR, +S1/S2, -S3/S4, no murmurs, rubs or gallops  Abdominal: Soft, NT, ND +BS, No HSM  MSK: 5/5 strength UE/LE bilaterally  Extremities: No edema, 2+ peripheral pulses  Skin: No Rashes, Hematoma, Ecchymosis  Incisions:   Tubes:    HOSPITAL MEDICATIONS:  MEDICATIONS  (STANDING):  acetylcysteine 10%  Inhalation 4 milliLiter(s) Inhalation daily  albuterol/ipratropium for Nebulization 3 milliLiter(s) Nebulizer once  albuterol/ipratropium for Nebulization 3 milliLiter(s) Nebulizer every 6 hours  amLODIPine   Tablet 5 milliGRAM(s) Oral daily  budesonide 160 MICROgram(s)/formoterol 4.5 MICROgram(s) Inhaler 2 Puff(s) Inhalation two times a day  cefepime   IVPB 2000 milliGRAM(s) IV Intermittent every 8 hours  cefepime   IVPB      dextrose 5%. 1000 milliLiter(s) (100 mL/Hr) IV Continuous <Continuous>  dextrose 5%. 1000 milliLiter(s) (50 mL/Hr) IV Continuous <Continuous>  dextrose 50% Injectable 12.5 Gram(s) IV Push once  dextrose 50% Injectable 25 Gram(s) IV Push once  dextrose 50% Injectable 25 Gram(s) IV Push once  enoxaparin Injectable 30 milliGRAM(s) SubCutaneous every 24 hours  glucagon  Injectable 1 milliGRAM(s) IntraMuscular once  insulin glargine Injectable (LANTUS) 4 Unit(s) SubCutaneous every morning  insulin lispro (ADMELOG) corrective regimen sliding scale   SubCutaneous three times a day before meals  insulin lispro (ADMELOG) corrective regimen sliding scale   SubCutaneous at bedtime  insulin lispro Injectable (ADMELOG) 2 Unit(s) SubCutaneous three times a day before meals  ketotifen 0.025% Ophthalmic Solution 1 Drop(s) Both EYES two times a day  metoprolol succinate ER 50 milliGRAM(s) Oral daily  mirtazapine 7.5 milliGRAM(s) Oral at bedtime  nystatin    Suspension 527221 Unit(s) Oral four times a day  pantoprazole    Tablet 40 milliGRAM(s) Oral before breakfast  polyethylene glycol 3350 17 Gram(s) Oral daily  senna 2 Tablet(s) Oral at bedtime  simethicone 80 milliGRAM(s) Chew daily  sodium chloride 1 Gram(s) Oral three times a day  sodium chloride 3%  Inhalation 4 milliLiter(s) Inhalation every 12 hours  vancomycin  IVPB 750 milliGRAM(s) IV Intermittent every 12 hours    MEDICATIONS  (PRN):  bisacodyl 5 milliGRAM(s) Oral every 12 hours PRN Constipation  dextrose Oral Gel 15 Gram(s) Oral once PRN Blood Glucose LESS THAN 70 milliGRAM(s)/deciliter  fluticasone propionate 50 MICROgram(s)/spray Nasal Spray 1 Spray(s) Both Nostrils two times a day PRN nasal congestion      LABS:                        10.8   10.25 )-----------( 131      ( 19 May 2023 06:00 )             32.9     Hgb Trend: 10.8<--, 11.9<--  05-19    121<L>  |  84<L>  |  9   ----------------------------<  211<H>  5.3   |  31  |  0.30<L>    Ca    8.9      19 May 2023 01:05  Phos  3.6       Mg     1.50         TPro  7.0  /  Alb  3.5  /  TBili  0.3  /  DBili  x   /  AST  32  /  ALT  15  /  AlkPhos  185<H>  17    Creatinine Trend: 0.30<--, 0.27<--, 0.29<--, 0.35<--, 0.27<--    Urinalysis Basic - ( 18 May 2023 12:38 )    Color: Yellow / Appearance: Slightly Turbid / S.017 / pH: x  Gluc: x / Ketone: Small  / Bili: Negative / Urobili: <2 mg/dL   Blood: x / Protein: 100 mg/dL / Nitrite: Negative   Leuk Esterase: Negative / RBC: 7 /HPF / WBC 4 /HPF   Sq Epi: x / Non Sq Epi: x / Bacteria: Moderate        Venous Blood Gas:   @ 03:10  7.31/75/132/38/98.5  VBG Lactate: 1.0  Venous Blood Gas:   @ 22:50  7.20/107/60/42/88.2  VBG Lactate: 0.6      MICROBIOLOGY:     Culture - Sputum (collected 18 May 2023 18:34)  Source: .Sputum Sputum  Gram Stain (19 May 2023 05:43):    Moderate polymorphonuclear leukocytes per low power field    Few Squamous epithelial cells per low power field    Moderate Gram Positive Rods seen per oil power field    Moderate Gram Negative Rods seen per oil power field    Moderate Gram Variable Cocci seen per oil power field    Rare Yeast like cells seen per oil power field

## 2023-05-19 NOTE — PROGRESS NOTE ADULT - ATTENDING COMMENTS
Patient is a 69 yo F w/ PCD c/b bronchiectasis w/ multiple admissions for bronchiectasis exacerbation (Pseudomonas and ESBL klebsiella), chronic hypoxemic and hypercapnic respiratory failure (2-4 L NC/AVAPS), HTN, prior COVID 1/2022 who was advised by her pulmonologist Dr. Young to present to ED for AMS and lethargy.    #Acute on chronic respiratory failure - AMS likely multifactorial in setting of acute on chronic hypercapnia as well as hyponatremia, lower than usual. Initial VBG 7.2/107/66 with repeat 7.31/75/132. Family endorses recent increased thicker sputum. Endorses compliance with AVAPS for at least 5 to 6 hours per night. Endorses compliance with airway clearance regimen  #Bronchiectasis  #Primary Ciliary Dyskinesia  - Agree with empiric antibiotics for bronchiectasis exacerbation. Currently ordered for Cefepime. Please call ID consult given history of resistant organisms  - f/u sputum cultures  - c/w Duonebs q6h, 3% q12h, mucomyst. Aerobika q6h  - c/w AVAPS PRN and qhs  - Please check blood gas     Caleb Bailey MD  Pulmonary & Critical Care

## 2023-05-19 NOTE — PROGRESS NOTE ADULT - SUBJECTIVE AND OBJECTIVE BOX
Demarcus Khan | PGY1| Pager: [place] [phone]) -- [place] ([phone])  Interval Events: NAEON; patient reports she had continued dyspnea even while sitting, still has significant sputum; appears more comfortable than the day before    OBJECTIVE:  ICU Vital Signs Last 24 Hrs  T(C): 36.8 (19 May 2023 13:15), Max: 36.9 (18 May 2023 21:20)  T(F): 98.2 (19 May 2023 13:15), Max: 98.5 (18 May 2023 21:20)  HR: 87 (19 May 2023 16:25) (81 - 100)  BP: 155/60 (19 May 2023 13:15) (142/64 - 160/57)  BP(mean): --  ABP: --  ABP(mean): --  RR: 18 (19 May 2023 13:15) (18 - 20)  SpO2: 96% (19 May 2023 16:25) (95% - 100%)    O2 Parameters below as of 19 May 2023 16:25  Patient On (Oxygen Delivery Method): nasal cannula          Mode: standby    -18 @ 07:01  -   @ 07:00  --------------------------------------------------------  IN: 0 mL / OUT: 1120 mL / NET: -1120 mL      CAPILLARY BLOOD GLUCOSE      POCT Blood Glucose.: 154 mg/dL (19 May 2023 17:18)      PHYSICAL EXAM:  General: WN/WD NAD  Neurology: A&Ox3, nonfocal, GARCIA x 4  Eyes: PERRLA/ EOMI, Gross vision intact  ENT/Neck: Neck supple, trachea midline, No JVD, Gross hearing intact  Respiratory: Junky lung sounds bilaterally  CV: RRR, +S1/S2, -S3/S4, no murmurs, rubs or gallops  Abdominal: Soft, NT, ND +BS, No HSM  Extremities: No edema, 2+ peripheral pulses  Skin: No Rashes, Hematoma, Ecchymosis    HOSPITAL MEDICATIONS:  MEDICATIONS  (STANDING):  acetylcysteine 10%  Inhalation 4 milliLiter(s) Inhalation daily  albuterol/ipratropium for Nebulization 3 milliLiter(s) Nebulizer once  albuterol/ipratropium for Nebulization 3 milliLiter(s) Nebulizer every 6 hours  amLODIPine   Tablet 5 milliGRAM(s) Oral daily  budesonide 160 MICROgram(s)/formoterol 4.5 MICROgram(s) Inhaler 2 Puff(s) Inhalation two times a day  ciprofloxacin     Tablet 750 milliGRAM(s) Oral two times a day  dextrose 5%. 1000 milliLiter(s) (100 mL/Hr) IV Continuous <Continuous>  dextrose 5%. 1000 milliLiter(s) (50 mL/Hr) IV Continuous <Continuous>  dextrose 50% Injectable 12.5 Gram(s) IV Push once  dextrose 50% Injectable 25 Gram(s) IV Push once  dextrose 50% Injectable 25 Gram(s) IV Push once  enoxaparin Injectable 30 milliGRAM(s) SubCutaneous every 24 hours  glucagon  Injectable 1 milliGRAM(s) IntraMuscular once  insulin glargine Injectable (LANTUS) 4 Unit(s) SubCutaneous every morning  insulin lispro (ADMELOG) corrective regimen sliding scale   SubCutaneous three times a day before meals  insulin lispro (ADMELOG) corrective regimen sliding scale   SubCutaneous at bedtime  insulin lispro Injectable (ADMELOG) 2 Unit(s) SubCutaneous three times a day before meals  ketotifen 0.025% Ophthalmic Solution 1 Drop(s) Both EYES two times a day  meropenem  IVPB 1000 milliGRAM(s) IV Intermittent every 8 hours  metoprolol succinate ER 50 milliGRAM(s) Oral daily  mirtazapine 7.5 milliGRAM(s) Oral at bedtime  nystatin    Suspension 795852 Unit(s) Oral four times a day  pantoprazole    Tablet 40 milliGRAM(s) Oral before breakfast  polyethylene glycol 3350 17 Gram(s) Oral daily  senna 2 Tablet(s) Oral at bedtime  simethicone 80 milliGRAM(s) Chew daily  sodium chloride 1 Gram(s) Oral three times a day  sodium chloride 3%  Inhalation 4 milliLiter(s) Inhalation every 12 hours    MEDICATIONS  (PRN):  bisacodyl 5 milliGRAM(s) Oral every 12 hours PRN Constipation  dextrose Oral Gel 15 Gram(s) Oral once PRN Blood Glucose LESS THAN 70 milliGRAM(s)/deciliter  fluticasone propionate 50 MICROgram(s)/spray Nasal Spray 1 Spray(s) Both Nostrils two times a day PRN nasal congestion      LABS:                        10.8   10.25 )-----------( 131      ( 19 May 2023 06:00 )             32.9     Hgb Trend: 10.8<--, 11.9<--  19    123<L>  |  84<L>  |  8   ----------------------------<  215<H>  4.8   |  32<H>  |  0.26<L>    Ca    9.0      19 May 2023 13:29  Phos  2.7       Mg     1.80         TPro  6.4  /  Alb  3.3  /  TBili  0.2  /  DBili  x   /  AST  22  /  ALT  15  /  AlkPhos  154<H>      Creatinine Trend: 0.26<--, 0.29<--, 0.30<--, 0.27<--, 0.29<--, 0.35<--    Urinalysis Basic - ( 18 May 2023 12:38 )    Color: Yellow / Appearance: Slightly Turbid / S.017 / pH: x  Gluc: x / Ketone: Small  / Bili: Negative / Urobili: <2 mg/dL   Blood: x / Protein: 100 mg/dL / Nitrite: Negative   Leuk Esterase: Negative / RBC: 7 /HPF / WBC 4 /HPF   Sq Epi: x / Non Sq Epi: x / Bacteria: Moderate        Venous Blood Gas:   @ 13:22  --/--/--/--/--  VBG Lactate: 0.9  Venous Blood Gas:   @ 03:10  7.31/75/132/38/98.5  VBG Lactate: 1.0  Venous Blood Gas:   @ 22:50  7.20/107/60/42/88.2  VBG Lactate: 0.6      MICROBIOLOGY:     Culture - Sputum (collected 18 May 2023 18:34)  Source: .Sputum Sputum  Gram Stain (19 May 2023 05:43):    Moderate polymorphonuclear leukocytes per low power field    Few Squamous epithelial cells per low power field    Moderate Gram Positive Rods seen per oil power field    Moderate Gram Negative Rods seen per oil power field    Moderate Gram Variable Cocci seen per oil power field    Rare Yeast like cells seen per oil power field    Culture - Blood (collected 18 May 2023 00:05)  Source: .Blood Blood-Peripheral  Preliminary Report (19 May 2023 07:01):    No growth to date.    Culture - Blood (collected 17 May 2023 23:50)  Source: .Blood Blood  Preliminary Report (19 May 2023 07:01):    No growth to date.

## 2023-05-19 NOTE — PROGRESS NOTE ADULT - ASSESSMENT
67 yo F PMH PCD c/b bronchiectasis w/ multiple admissions for bronchiectasis exacerbation, HTN, COVID infection 1/2022, Pseudomonas and ESBL klebsiella, chronic hypoxemic on 2-4LNC and hypercapneic respiratory failure on AVAPS referred from outpatient pulmonologist for worsening mental status and lethargy found to be severely hyponatremia. Pulmonary consulted for bronchiectasis management and hypercarbic respiratory failure    - recommend empiric abx, would reach out to ID given history of multidrug resistant organisms; s/p inhaled tobramycin outpt  - F/u sputum culture  - blood gases initially showing respiratory acidosis  with improvement after NIPPV; non-lethargic on exam. Would continue AVAPS qhs and prn  - c/w supplemental O2, goal 90-92%  - Sodium improving  - please maintain on q6 duonebs, q12 hypersal, mucomyst, and aerobika; please add chest PT and physical therapy  - TTE reviewed  - would recommend LE duplex for asymmetric foot edema  - consider palliative care evaluation  - pulmonary will follow    David Aldrich MD  PGY-6  PCCM Fellow  Pager [phone] 67 yo F PMH PCD c/b bronchiectasis w/ multiple admissions for bronchiectasis exacerbation, HTN, COVID infection 1/2022, Pseudomonas and ESBL klebsiella, chronic hypoxemic on 2-4LNC and hypercapneic respiratory failure on AVAPS referred from outpatient pulmonologist for worsening mental status and lethargy found to be severely hyponatremia. Pulmonary consulted for bronchiectasis management and hypercarbic respiratory failure    - recommend empiric abx, would reach out to ID given history of multidrug resistant organisms; s/p inhaled tobramycin outpt  - F/u sputum culture  - blood gases initially showing respiratory acidosis  with improvement after NIPPV; non-lethargic on exam. Would continue AVAPS qhs and prn  - c/w supplemental O2, goal 90-92%  - Sodium improving  - please maintain on q6 duonebs, q12 hypersal, mucomyst, and aerobika; please add chest PT and physical therapy  - TTE reviewed  - would recommend LE duplex for asymmetric foot edema  - consider palliative care evaluation  - pulmonary will follow    Carson Ramsay, PGY-7  Pulmonary and Critical Care Medicine  [phone]

## 2023-05-19 NOTE — PROGRESS NOTE ADULT - ATTENDING COMMENTS
68F with PMH of primary ciliary dyskinesia, bronchiectasis, chronic hypoxic/hypercapnic respiratory failure on 4LNC/AVAPS at home, HTN, chronic constipation, recently hospitalized for resp failure who presents to hospital w/ AMS and SOB. Initial workup notable for respiratory acidosis/hypercarbia on gas. Pt also noted to be hyponatremic. CXR appeared stable w/ chronic changes when compared to before. No overt signs of infection based on current workup. Admit for acute on chronic hypoxic/hypercapnic respiratory failure 2’ PCD/bronchiectasis. MICU was consulted, but pt deemed stable for floor. Pulm on board for further input. ID c/s for abx recs. Remains hospitalized pending further improvement.      Pt seen at bedside.  ID: [id number]. States she had trouble sleeping. Used AVAPs at night. On exam – laying in bed, NAD, speaking full sentences. Rest of exam unchanged as noted above. Labs notable for improved Na up to 123.       #Acute on chronic hypercapnic and hypoxemic respiratory failure 2' bronchiectasis, primary ciliary dyskinesia  #Metabolic encephalopathy 2’ hypercarbia/above  #Respiratory acidosis  #Hyponatremia, poss hypovolemic v. SIADH  #HTN  #Hyperkalemia  #Thrombocytopenia       -Cont broad spectrum abx. ID c/s pending.  -Cont pulmonary toilet, inhaler regimen as ordered. Appreciate pulm recs.  -Na better. S/p IVF. For now, cont to encourage PO intake. Pt was started on salt tabs --- can consider DCing pending further improvement of Na. Serial BMP q6, can reduce to q8h if she improves.      Rest of plan as outlined above.

## 2023-05-19 NOTE — PROGRESS NOTE ADULT - PROBLEM SELECTOR PLAN 3
Suspect SIADH based on prior as well as significant lung pathology; recent nausea/vomiting  Absolute limit 131 at 5am 5/20    Plan:  BMP q6h  c/w Salt Tabs 1g TID  500cc NS

## 2023-05-19 NOTE — PATIENT PROFILE ADULT - FALL HARM RISK - HARM RISK INTERVENTIONS
Assistance with ambulation/Assistance OOB with selected safe patient handling equipment/Communicate Risk of Fall with Harm to all staff/Discuss with provider need for PT consult/Monitor gait and stability/Reinforce activity limits and safety measures with patient and family/Tailored Fall Risk Interventions/Use of alarms - bed, chair and/or voice tab/Visual Cue: Yellow wristband and red socks/Bed in lowest position, wheels locked, appropriate side rails in place/Call bell, personal items and telephone in reach/Instruct patient to call for assistance before getting out of bed or chair/Non-slip footwear when patient is out of bed/[place] to call system/Physically safe environment - no spills, clutter or unnecessary equipment/Purposeful Proactive Rounding/Room/bathroom lighting operational, light cord in reach

## 2023-05-20 ENCOUNTER — TRANSCRIPTION ENCOUNTER (OUTPATIENT)
Age: 68
End: 2023-05-20

## 2023-05-20 LAB
ALBUMIN SERPL ELPH-MCNC: 3.3 G/DL — SIGNIFICANT CHANGE UP (ref 3.3–5)
ALP SERPL-CCNC: 152 U/L — HIGH (ref 40–120)
ALT FLD-CCNC: 27 U/L — SIGNIFICANT CHANGE UP (ref 4–33)
ANION GAP SERPL CALC-SCNC: 5 MMOL/L — LOW (ref 7–14)
ANION GAP SERPL CALC-SCNC: 6 MMOL/L — LOW (ref 7–14)
ANION GAP SERPL CALC-SCNC: 7 MMOL/L — SIGNIFICANT CHANGE UP (ref 7–14)
AST SERPL-CCNC: 35 U/L — HIGH (ref 4–32)
BASE EXCESS BLDV CALC-SCNC: 14.3 MMOL/L — HIGH (ref -2–3)
BILIRUB SERPL-MCNC: 0.2 MG/DL — SIGNIFICANT CHANGE UP (ref 0.2–1.2)
BLOOD GAS VENOUS COMPREHENSIVE RESULT: SIGNIFICANT CHANGE UP
BUN SERPL-MCNC: 10 MG/DL — SIGNIFICANT CHANGE UP (ref 7–23)
BUN SERPL-MCNC: 7 MG/DL — SIGNIFICANT CHANGE UP (ref 7–23)
BUN SERPL-MCNC: 8 MG/DL — SIGNIFICANT CHANGE UP (ref 7–23)
CA-I SERPL-SCNC: 1.31 MMOL/L — SIGNIFICANT CHANGE UP (ref 1.15–1.33)
CALCIUM SERPL-MCNC: 9 MG/DL — SIGNIFICANT CHANGE UP (ref 8.4–10.5)
CALCIUM SERPL-MCNC: 9.2 MG/DL — SIGNIFICANT CHANGE UP (ref 8.4–10.5)
CALCIUM SERPL-MCNC: 9.5 MG/DL — SIGNIFICANT CHANGE UP (ref 8.4–10.5)
CHLORIDE BLDV-SCNC: 88 MMOL/L — LOW (ref 96–108)
CHLORIDE SERPL-SCNC: 84 MMOL/L — LOW (ref 98–107)
CHLORIDE SERPL-SCNC: 85 MMOL/L — LOW (ref 98–107)
CHLORIDE SERPL-SCNC: 85 MMOL/L — LOW (ref 98–107)
CO2 BLDV-SCNC: 47.1 MMOL/L — HIGH (ref 22–26)
CO2 SERPL-SCNC: 34 MMOL/L — HIGH (ref 22–31)
CO2 SERPL-SCNC: 36 MMOL/L — HIGH (ref 22–31)
CO2 SERPL-SCNC: 37 MMOL/L — HIGH (ref 22–31)
CREAT SERPL-MCNC: 0.27 MG/DL — LOW (ref 0.5–1.3)
CREAT SERPL-MCNC: 0.28 MG/DL — LOW (ref 0.5–1.3)
CREAT SERPL-MCNC: 0.29 MG/DL — LOW (ref 0.5–1.3)
EGFR: 118 ML/MIN/1.73M2 — SIGNIFICANT CHANGE UP
EGFR: 119 ML/MIN/1.73M2 — SIGNIFICANT CHANGE UP
EGFR: 120 ML/MIN/1.73M2 — SIGNIFICANT CHANGE UP
GAS PNL BLDV: 124 MMOL/L — LOW (ref 136–145)
GAS PNL BLDV: SIGNIFICANT CHANGE UP
GLUCOSE BLDC GLUCOMTR-MCNC: 154 MG/DL — HIGH (ref 70–99)
GLUCOSE BLDC GLUCOMTR-MCNC: 158 MG/DL — HIGH (ref 70–99)
GLUCOSE BLDC GLUCOMTR-MCNC: 244 MG/DL — HIGH (ref 70–99)
GLUCOSE BLDC GLUCOMTR-MCNC: 267 MG/DL — HIGH (ref 70–99)
GLUCOSE BLDV-MCNC: 151 MG/DL — HIGH (ref 70–99)
GLUCOSE SERPL-MCNC: 138 MG/DL — HIGH (ref 70–99)
GLUCOSE SERPL-MCNC: 151 MG/DL — HIGH (ref 70–99)
GLUCOSE SERPL-MCNC: 222 MG/DL — HIGH (ref 70–99)
GLUCOSE SERPL-MCNC: 223 MG/DL — HIGH (ref 70–99)
HCO3 BLDV-SCNC: 44 MMOL/L — HIGH (ref 22–29)
HCT VFR BLD CALC: 33.7 % — LOW (ref 34.5–45)
HCT VFR BLDA CALC: 35 % — SIGNIFICANT CHANGE UP (ref 34.5–46.5)
HGB BLD CALC-MCNC: 11.5 G/DL — LOW (ref 11.7–16.1)
HGB BLD-MCNC: 10.9 G/DL — LOW (ref 11.5–15.5)
LACTATE BLDV-MCNC: 0.7 MMOL/L — SIGNIFICANT CHANGE UP (ref 0.5–2)
MAGNESIUM SERPL-MCNC: 1.7 MG/DL — SIGNIFICANT CHANGE UP (ref 1.6–2.6)
MAGNESIUM SERPL-MCNC: 1.7 MG/DL — SIGNIFICANT CHANGE UP (ref 1.6–2.6)
MAGNESIUM SERPL-MCNC: 1.8 MG/DL — SIGNIFICANT CHANGE UP (ref 1.6–2.6)
MCHC RBC-ENTMCNC: 26.5 PG — LOW (ref 27–34)
MCHC RBC-ENTMCNC: 32.3 GM/DL — SIGNIFICANT CHANGE UP (ref 32–36)
MCV RBC AUTO: 81.8 FL — SIGNIFICANT CHANGE UP (ref 80–100)
NRBC # BLD: 0 /100 WBCS — SIGNIFICANT CHANGE UP (ref 0–0)
NRBC # FLD: 0 K/UL — SIGNIFICANT CHANGE UP (ref 0–0)
PCO2 BLDV: 90 MMHG — HIGH (ref 39–52)
PH BLDV: 7.3 — LOW (ref 7.32–7.43)
PHOSPHATE SERPL-MCNC: 2 MG/DL — LOW (ref 2.5–4.5)
PHOSPHATE SERPL-MCNC: 2.4 MG/DL — LOW (ref 2.5–4.5)
PHOSPHATE SERPL-MCNC: 3.1 MG/DL — SIGNIFICANT CHANGE UP (ref 2.5–4.5)
PLATELET # BLD AUTO: 150 K/UL — SIGNIFICANT CHANGE UP (ref 150–400)
PO2 BLDV: 66 MMHG — HIGH (ref 25–45)
POTASSIUM BLDV-SCNC: 4.4 MMOL/L — SIGNIFICANT CHANGE UP (ref 3.5–5.1)
POTASSIUM SERPL-MCNC: 4.3 MMOL/L — SIGNIFICANT CHANGE UP (ref 3.5–5.3)
POTASSIUM SERPL-MCNC: 4.3 MMOL/L — SIGNIFICANT CHANGE UP (ref 3.5–5.3)
POTASSIUM SERPL-MCNC: 4.9 MMOL/L — SIGNIFICANT CHANGE UP (ref 3.5–5.3)
POTASSIUM SERPL-SCNC: 4.3 MMOL/L — SIGNIFICANT CHANGE UP (ref 3.5–5.3)
POTASSIUM SERPL-SCNC: 4.3 MMOL/L — SIGNIFICANT CHANGE UP (ref 3.5–5.3)
POTASSIUM SERPL-SCNC: 4.9 MMOL/L — SIGNIFICANT CHANGE UP (ref 3.5–5.3)
PROT SERPL-MCNC: 6.6 G/DL — SIGNIFICANT CHANGE UP (ref 6–8.3)
RBC # BLD: 4.12 M/UL — SIGNIFICANT CHANGE UP (ref 3.8–5.2)
RBC # FLD: 12.9 % — SIGNIFICANT CHANGE UP (ref 10.3–14.5)
SAO2 % BLDV: 92.4 % — HIGH (ref 67–88)
SODIUM SERPL-SCNC: 123 MMOL/L — LOW (ref 135–145)
SODIUM SERPL-SCNC: 128 MMOL/L — LOW (ref 135–145)
SODIUM SERPL-SCNC: 128 MMOL/L — LOW (ref 135–145)
WBC # BLD: 13.21 K/UL — HIGH (ref 3.8–10.5)
WBC # FLD AUTO: 13.21 K/UL — HIGH (ref 3.8–10.5)

## 2023-05-20 PROCEDURE — 99233 SBSQ HOSP IP/OBS HIGH 50: CPT | Mod: GC

## 2023-05-20 PROCEDURE — 99232 SBSQ HOSP IP/OBS MODERATE 35: CPT | Mod: GC

## 2023-05-20 PROCEDURE — 99291 CRITICAL CARE FIRST HOUR: CPT | Mod: 25

## 2023-05-20 RX ORDER — SODIUM CHLORIDE 9 MG/ML
1000 INJECTION, SOLUTION INTRAVENOUS
Refills: 0 | Status: DISCONTINUED | OUTPATIENT
Start: 2023-05-20 | End: 2023-05-29

## 2023-05-20 RX ORDER — DEXTROSE 50 % IN WATER 50 %
15 SYRINGE (ML) INTRAVENOUS ONCE
Refills: 0 | Status: DISCONTINUED | OUTPATIENT
Start: 2023-05-20 | End: 2023-05-29

## 2023-05-20 RX ORDER — DEXTROSE 50 % IN WATER 50 %
12.5 SYRINGE (ML) INTRAVENOUS ONCE
Refills: 0 | Status: DISCONTINUED | OUTPATIENT
Start: 2023-05-20 | End: 2023-05-29

## 2023-05-20 RX ORDER — INSULIN GLARGINE 100 [IU]/ML
2 INJECTION, SOLUTION SUBCUTANEOUS EVERY MORNING
Refills: 0 | Status: DISCONTINUED | OUTPATIENT
Start: 2023-05-21 | End: 2023-05-22

## 2023-05-20 RX ORDER — DEXTROSE 50 % IN WATER 50 %
25 SYRINGE (ML) INTRAVENOUS ONCE
Refills: 0 | Status: DISCONTINUED | OUTPATIENT
Start: 2023-05-20 | End: 2023-05-29

## 2023-05-20 RX ORDER — INSULIN LISPRO 100/ML
VIAL (ML) SUBCUTANEOUS
Refills: 0 | Status: DISCONTINUED | OUTPATIENT
Start: 2023-05-20 | End: 2023-05-29

## 2023-05-20 RX ORDER — GLUCAGON INJECTION, SOLUTION 0.5 MG/.1ML
1 INJECTION, SOLUTION SUBCUTANEOUS ONCE
Refills: 0 | Status: DISCONTINUED | OUTPATIENT
Start: 2023-05-20 | End: 2023-05-29

## 2023-05-20 RX ORDER — INSULIN LISPRO 100/ML
VIAL (ML) SUBCUTANEOUS EVERY 6 HOURS
Refills: 0 | Status: DISCONTINUED | OUTPATIENT
Start: 2023-05-20 | End: 2023-05-20

## 2023-05-20 RX ADMIN — SODIUM CHLORIDE 1 GRAM(S): 9 INJECTION INTRAMUSCULAR; INTRAVENOUS; SUBCUTANEOUS at 06:39

## 2023-05-20 RX ADMIN — AMLODIPINE BESYLATE 5 MILLIGRAM(S): 2.5 TABLET ORAL at 06:38

## 2023-05-20 RX ADMIN — INSULIN GLARGINE 4 UNIT(S): 100 INJECTION, SOLUTION SUBCUTANEOUS at 09:03

## 2023-05-20 RX ADMIN — KETOTIFEN FUMARATE 1 DROP(S): 0.34 SOLUTION OPHTHALMIC at 06:38

## 2023-05-20 RX ADMIN — SODIUM CHLORIDE 1 GRAM(S): 9 INJECTION INTRAMUSCULAR; INTRAVENOUS; SUBCUTANEOUS at 22:49

## 2023-05-20 RX ADMIN — PANTOPRAZOLE SODIUM 40 MILLIGRAM(S): 20 TABLET, DELAYED RELEASE ORAL at 06:38

## 2023-05-20 RX ADMIN — MEROPENEM 100 MILLIGRAM(S): 1 INJECTION INTRAVENOUS at 22:49

## 2023-05-20 RX ADMIN — BUDESONIDE AND FORMOTEROL FUMARATE DIHYDRATE 2 PUFF(S): 160; 4.5 AEROSOL RESPIRATORY (INHALATION) at 22:47

## 2023-05-20 RX ADMIN — Medication 50 MILLIGRAM(S): at 06:39

## 2023-05-20 RX ADMIN — Medication 750 MILLIGRAM(S): at 18:01

## 2023-05-20 RX ADMIN — Medication 3: at 17:59

## 2023-05-20 RX ADMIN — Medication 500000 UNIT(S): at 18:01

## 2023-05-20 RX ADMIN — Medication 500000 UNIT(S): at 06:39

## 2023-05-20 RX ADMIN — MIRTAZAPINE 7.5 MILLIGRAM(S): 45 TABLET, ORALLY DISINTEGRATING ORAL at 22:50

## 2023-05-20 RX ADMIN — Medication 63.75 MILLIMOLE(S): at 10:29

## 2023-05-20 RX ADMIN — Medication 3 MILLILITER(S): at 23:22

## 2023-05-20 RX ADMIN — SENNA PLUS 2 TABLET(S): 8.6 TABLET ORAL at 22:49

## 2023-05-20 RX ADMIN — SODIUM CHLORIDE 4 MILLILITER(S): 9 INJECTION INTRAMUSCULAR; INTRAVENOUS; SUBCUTANEOUS at 10:15

## 2023-05-20 RX ADMIN — Medication 3 MILLILITER(S): at 15:02

## 2023-05-20 RX ADMIN — Medication 750 MILLIGRAM(S): at 06:38

## 2023-05-20 RX ADMIN — KETOTIFEN FUMARATE 1 DROP(S): 0.34 SOLUTION OPHTHALMIC at 18:09

## 2023-05-20 RX ADMIN — ENOXAPARIN SODIUM 30 MILLIGRAM(S): 100 INJECTION SUBCUTANEOUS at 06:39

## 2023-05-20 RX ADMIN — Medication 1: at 09:02

## 2023-05-20 RX ADMIN — Medication 3 MILLILITER(S): at 10:15

## 2023-05-20 RX ADMIN — MEROPENEM 100 MILLIGRAM(S): 1 INJECTION INTRAVENOUS at 13:41

## 2023-05-20 RX ADMIN — MEROPENEM 100 MILLIGRAM(S): 1 INJECTION INTRAVENOUS at 06:38

## 2023-05-20 RX ADMIN — SODIUM CHLORIDE 4 MILLILITER(S): 9 INJECTION INTRAMUSCULAR; INTRAVENOUS; SUBCUTANEOUS at 23:22

## 2023-05-20 RX ADMIN — Medication 3 MILLILITER(S): at 04:33

## 2023-05-20 NOTE — DIETITIAN INITIAL EVALUATION ADULT - ADD RECOMMEND
1) Diet deferred to medical team. Would recommend swallow eval prior to starting PO if appropriate.  2) If PO diet restarted recommend liberlaizing to CSTCHO (d/c DASH/TLC)  3) If PO diet started recommend Glucerna 2x daily (440kcals, 20g protein)   4) Monitor weights, labs, BM's, skin integrity.   5) RD available prn

## 2023-05-20 NOTE — DISCHARGE NOTE PROVIDER - HOSPITAL COURSE
68F with hx of primary ciliary dyskinesia, HTN, chronic constipation, bronchiectasis on 4L NC w/ frequent admissions for bronchiectasis (4/22) presents with 3 days of AMS and vomiting, found to be hypercapneic by outpatient PCP; recommending going to the ED.    of note; patient was here from march 29 to april 11th for bronchiectasis exacerbation as well as hyponatremia likely iso SIADH. Patient underwent 5 day course of prednisone, 7 day course of cefepime, and significant pulmonary toilet and was DC'd with AVAPS at home.     Patient is a poor historian and information was also obtained from daughter in law Suki Rodney. Daughter in law reports that since their recent discharge from [facility]; she has been very compliant with her medication and with the AVAPS. However, she reports they consistently get very elevated pCO2 levels despite compliance. Over the past three days, patient reports she has become more dyspneic and has a lot of mucus in her airway. Daughter reports patient appears more confused and altered for the past three days and has had episodes of nausea and vomiting. Patient also endorses difficulty urinating as well as 5 days of constipation. Denies any recent travel to foreign countries, denies any sick contacts, any allergies or any recent fevers or changes in her medications.     ED Vitals 97.5, 152/68, 66BPM, 24 100% 4LNC  Patient received CFTX x1, 10U Insulin, with D50, got Duonebs. Patient was hypercapnic in the ED. MICU was consulted for potential intubation, but patient's hypercapnia improved on AVAPS while in the ED and MICU determined no need for urgent intubation    MAJOR MEDICAL CHANGES  - Please follow up with your PCP:  *** in 1-2 weeks for management of your general health  - Please continue to take  - Please continue  -    68F with hx of primary ciliary dyskinesia, HTN, chronic constipation, bronchiectasis on 4L NC w/ frequent admissions for bronchiectasis (4/22) presents with 3 days of AMS and vomiting, found to be hypercapneic by outpatient PCP; recommending going to the ED.    of note; patient was here from march 29 to april 11th for bronchiectasis exacerbation as well as hyponatremia likely iso SIADH. Patient underwent 5 day course of prednisone, 7 day course of cefepime, and significant pulmonary toilet and was DC'd with AVAPS at home.     Patient is a poor historian and information was also obtained from daughter in law Suki Rodney. Daughter in law reports that since their recent discharge from [facility]; she has been very compliant with her medication and with the AVAPS. However, she reports they consistently get very elevated pCO2 levels despite compliance. Over the past three days, patient reports she has become more dyspneic and has a lot of mucus in her airway. Daughter reports patient appears more confused and altered for the past three days and has had episodes of nausea and vomiting. Patient also endorses difficulty urinating as well as 5 days of constipation. Denies any recent travel to foreign countries, denies any sick contacts, any allergies or any recent fevers or changes in her medications. Pt was started on AVAPS and pressures were increased to decrease pCO2 levels. Pt was started on ABx adn ID was consulted. Low suspicion that infection was driving pt's symptoms but pt to complete a 10day course of ABx due to tenuous respiratory status. Pt was also given a short 5 day course of prednisone. Pt's pCO2 stabilized in the mid 70's where it was previously during last admission. Pt's clinical and mental status improved.     ED Vitals 97.5, 152/68, 66BPM, 24 100% 4LNC  Patient received CFTX x1, 10U Insulin, with D50, got Duonebs. Patient was hypercapnic in the ED. MICU was consulted for potential intubation, but patient's hypercapnia improved on AVAPS while in the ED and MICU determined no need for urgent intubation    MAJOR MEDICAL CHANGES  - Please follow up with your Pulmonologist Dr. Shelli Young in 1-2 weeks for management of your pulmonary disease  - Please continue to take your medications as prescribed  - Please continue to wear your AVAPs mask at night when you sleep   68F with hx of primary ciliary dyskinesia, HTN, chronic constipation, bronchiectasis on 4L NC w/ frequent admissions for bronchiectasis (4/22) presents with 3 days of AMS and vomiting, found to be hypercapneic by outpatient PCP; recommending going to the ED.    of note; patient was here from march 29 to april 11th for bronchiectasis exacerbation as well as hyponatremia likely iso SIADH. Patient underwent 5 day course of prednisone, 7 day course of cefepime, and significant pulmonary toilet and was DC'd with AVAPS at home.     Patient is a poor historian and information was also obtained from daughter in law Suki Rodney. Daughter in law reports that since their recent discharge from [facility]; she has been very compliant with her medication and with the AVAPS. However, she reports they consistently get very elevated pCO2 levels despite compliance. Over the past three days, patient reports she has become more dyspneic and has a lot of mucus in her airway. Daughter reports patient appears more confused and altered for the past three days and has had episodes of nausea and vomiting. Patient also endorses difficulty urinating as well as 5 days of constipation. Denies any recent travel to foreign countries, denies any sick contacts, any allergies or any recent fevers or changes in her medications. Pt was started on AVAPS and pressures were increased to decrease pCO2 levels. Pt was started on ABx adn ID was consulted. Low suspicion that infection was driving pt's symptoms but pt to complete a 10day course of ABx due to tenuous respiratory status. Pt was also given a short 5 day course of prednisone. Pt's pCO2 stabilized in the mid 72 where it was previously during last admission (mid 70s). This is likely patient's new baseline. Pt's clinical and mental status improved.     ED Vitals 97.5, 152/68, 66BPM, 24 100% 4LNC  Patient received CFTX x1, 10U Insulin, with D50, got Duonebs. Patient was hypercapnic in the ED. MICU was consulted for potential intubation, but patient's hypercapnia improved on AVAPS while in the ED and MICU determined no need for urgent intubation    MAJOR MEDICAL CHANGES  - Please follow up with your Pulmonologist Dr. Shelli Young in 1-2 weeks for management of your pulmonary disease  - Please continue to take your medications as prescribed  - Please continue to wear your AVAPs mask at night when you sleep   68F with hx of primary ciliary dyskinesia, HTN, chronic constipation, bronchiectasis on 4L NC w/ frequent admissions for bronchiectasis (4/22) presents with 3 days of AMS and vomiting, found to be hypercapneic by outpatient PCP; recommending going to the ED.    of note; patient was here from march 29 to april 11th for bronchiectasis exacerbation as well as hyponatremia likely iso SIADH. Patient underwent 5 day course of prednisone, 7 day course of cefepime, and significant pulmonary toilet and was DC'd with AVAPS at home.     Patient is a poor historian and information was also obtained from daughter in law Suki Rodney. Daughter in law reports that since their recent discharge from [facility] she has been very compliant with her medication and with the AVAPS. However, she reports they consistently get very elevated pCO2 levels despite compliance. Over the past three days, patient reports she has become more dyspneic and has a lot of mucus in her airway. Daughter reports patient appears more confused and altered for the past three days and has had episodes of nausea and vomiting. Patient also endorses difficulty urinating as well as 5 days of constipation. Denies any recent travel to foreign countries, denies any sick contacts, any allergies or any recent fevers or changes in her medications. Pt was started on AVAPS and pressures were increased to decrease pCO2 levels. Pt was started on ABx and ID was consulted. Low suspicion that infection was driving pt's symptoms but completed a 10day course of ABx due to tenuous respiratory status. Pt was also given a short 5 day course of prednisone. Pt's pCO2 stabilized at 72 where it was previously during last admission (mid 70s). This is likely patient's new baseline. Pt's clinical and mental status improved.       MAJOR MEDICAL CHANGES  - Please follow up with your Pulmonologist Dr. Shelli Young in 1-2 weeks for management of your pulmonary disease  - Please continue to take your medications as prescribed  - Please continue to wear your AVAPs mask at night when you sleep   68F with hx of primary ciliary dyskinesia, HTN, chronic constipation, bronchiectasis on 4L NC w/ frequent admissions for bronchiectasis (4/22) presents with 3 days of AMS and vomiting, found to be hypercapneic by outpatient PCP; recommending going to the ED.    of note; patient was here from march 29 to april 11th for bronchiectasis exacerbation as well as hyponatremia likely iso SIADH. Patient underwent 5 day course of prednisone, 7 day course of cefepime, and significant pulmonary toilet and was DC'd with AVAPS at home.     Patient is a poor historian and information was also obtained from daughter in law Suki Rodney. Daughter in law reports that since their recent discharge from [facility]; she has been very compliant with her medication and with the AVAPS. However, she reports they consistently get very elevated pCO2 levels despite compliance. Over the past three days, patient reports she has become more dyspneic and has a lot of mucus in her airway. Daughter reports patient appears more confused and altered for the past three days and has had episodes of nausea and vomiting. Patient also endorses difficulty urinating as well as 5 days of constipation. Denies any recent travel to foreign countries, denies any sick contacts, any allergies or any recent fevers or changes in her medications. Pt was started on AVAPS and pressures were increased to decrease pCO2 levels. Pt was started on ABx and ID was consulted. Low suspicion that infection was driving pt's symptoms but completed a 10day course of ABx due to tenuous respiratory status. Pt was also given a short 5 day course of prednisone. Pt's pCO2 stabilized at 72 where it was previously during last admission (mid 70s). This is likely patient's new baseline. Pt's clinical and mental status improved.       MAJOR MEDICAL CHANGES  - Please follow up with your Pulmonologist Dr. Shelli Young in 1-2 weeks for management of your pulmonary disease  - Please continue to take your medications as prescribed  - Please continue to wear your AVAPs mask at night when you sleep      Patient was seen and evaluated today. She reports feeling better and denies any acute complaints at this time.  states she wants to go home.   Provider speaks [place] and pt declined  services.   Discussed discharge medications, plan and outpatient follow up with patient, spouse and son at bedside. All questions answered and patient in agreement with discharge plan.   Patient is hemodynamically stable for discharge with outpatient follow up with PCP, Pulmonology and Cardiology.

## 2023-05-20 NOTE — DIETITIAN INITIAL EVALUATION ADULT - PROBLEM SELECTOR PLAN 8
Diet: CC/DASH no beef/pork  Psych/Sleep: None FRANCES  GI: Protonix  DVT ppx: Lovenox 40mg   Code Status: Pending further discusision  Dispo:

## 2023-05-20 NOTE — DIETITIAN INITIAL EVALUATION ADULT - ORAL INTAKE PTA/DIET HISTORY
Unable to otain subjective information at this time, large family visiting. Per discussion with RN, interview inappropriate at this time 2/2 Pt now NPO and per [facility] note 5/20 Pt with "tenuous status" . Per chart Pt lives in private home with family who assist with cooking and grocery shopping.

## 2023-05-20 NOTE — DISCHARGE NOTE PROVIDER - NSDCCPCAREPLAN_GEN_ALL_CORE_FT
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 It limits your lung's ability to remove particles influding viral/bacterial. This cases chronic infections and inflammation of your lungs and airways leading to damage and scarring over time. This has led to inefective lung function and your body is retaining carbon dioxide. The rise in carbon dioxide has led to worsening respiratory status and breathing as well as worsening mental status/confusion. Your condition has advanced to the point of requiring a machine called AVAPs which you wear a mask when you sleep. The machine uses pressure to help your airways and lungs open to help increase ventilation and decrease the level of carbon dioxide in your body. It is important for you to wear the machine every night. It is important for you to make sure there is minimal air leak around the mask when you wear the mask. It is important for you to make sure you take your lung medications including nebulizers as directed.   If you experience worsening shortness of breath or respiratory status please seek urgent medical care. If you expereince worsening confusion please seek urgent medical care.      SECONDARY DISCHARGE DIAGNOSES  Diagnosis: Hyponatremia  Assessment and Plan of Treatment: You were found to have low sodium levels in bloodwork. The studies indicated that it may be due to dehydration and low volume. Your sodium improved with fluids and salt tablets. Please be sure to hydrate and eat a well balanced diet.     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 It limits your lung's ability to remove particles influding viral/bacterial. This cases chronic infections and inflammation of your lungs and airways leading to damage and scarring over time. This has led to inefective lung function and your body is retaining carbon dioxide. The rise in carbon dioxide has led to worsening respiratory status and breathing as well as worsening mental status/confusion. Your condition has advanced to the point of requiring a machine called AVAPs which you wear a mask when you sleep. The machine uses pressure to help your airways and lungs open to help increase ventilation and decrease the level of carbon dioxide in your body. It is important for you to wear the machine every night. It is important for you to make sure there is minimal air leak around the mask when you wear the mask. It is important for you to make sure you take your lung medications including nebulizers as directed.   If you experience worsening shortness of breath or respiratory status please seek urgent medical care. If you expereince worsening confusion please seek urgent medical care.      SECONDARY DISCHARGE DIAGNOSES  Diagnosis: Hyponatremia  Assessment and Plan of Treatment: You were found to have low sodium levels in bloodwork. The studies indicated that it may be due to dehydration and low volume and increase in hormone secretion called ADH. Your sodium improved with fluids and salt tablets. Please be sure to hydrate and eat a well balanced diet. Please continue to take salt tablets 3 times per day.

## 2023-05-20 NOTE — PROGRESS NOTE ADULT - CRITICAL CARE ATTENDING COMMENT
30 minutes spent at bedside providing critical care service due to pt's decompensating respiratory status.  Patient suctions, supp O2 increased w/o improvement.   VBG reviewed w/ worsening hypercarbia. O2 sats otherwise ok.  Pt closely monitored at bedside. RT arrived to place pt back on AVAPS. Close monitoring maintained for further improvement. Appears better with less accessory muscle use and seems more comfort.  Prognosis remains guarded. Tenuous status.

## 2023-05-20 NOTE — PROGRESS NOTE ADULT - PROBLEM SELECTOR PLAN 8
Diet: CC/DASH no beef/pork  Psych/Sleep: None FRANCES  GI: Protonix  DVT ppx: Lovenox 40mg   Code Status: Pending further discussion  Dispo: Pending resolution of hyponatremia and hypercapnia

## 2023-05-20 NOTE — DISCHARGE NOTE PROVIDER - NSDCMRMEDTOKEN_GEN_ALL_CORE_FT
acetylcysteine 10% inhalation solution: 4 milliliter(s) inhaled 2 times a day  Admelog SoloStar 100 units/mL injectable solution: 2 unit(s) injectable 3 times a day (before meals) (depending on reading).  ALBUTEROL SULFATE (2.5 MG/3ML)0.083% NEBULIZER: USE 1 UNIT DOSE EVERY 4-6 HOURS AS NEEDED FOR WHEEZING .  AMLODIPINE BESYLATE 5MG TABLET: TAKE 1 TABLET (5 MG) BY MOUTH DAILY FOR HIGH BLOOD PRESSURE  azithromycin 500 mg oral tablet: 1 tab(s) orally Monday, Wednesday, and Friday  bisacodyl 5 mg oral delayed release tablet: 1 tab(s) orally once daily as needed  Calcium 600+D 600 mg-5 mcg (200 intl units) oral tablet: 1 tab(s) orally 2 times a day  CYANOCOBALAMIN 1000MCG/ML SOLUTION: ADMINISTER 1 MILLILITER (1,000 MCG) SUBCUTANEOUS EVERY 7 DAYS  FERROUS GLUCONATE 324 (38 FE)MG TABLET: TAKE 1 TABLET BY MOUTH 2 TIMES PER DAY BETWEEN MEALS IN WATER OR JUICE  Flonase 50 mcg/inh nasal spray: 1 spray(s) in each nostril once a day as needed  GNP GAS RELIEF 80 MG ORAL TABLET CHEWABLE: TAKE 1 TABLET BY MOUTH 4 TIMES PER DAY AFTER MEALS AND AT BEDTIME AS NEEDED FOR GAS  insulin glargine 100 units/mL subcutaneous solution: 6 unit(s) subcutaneous once a day (at bedtime)  ketotifen 0.025% ophthalmic solution: 1 drop(s) to each affected eye 2 times a day as needed  lactobacillus acidophilus oral capsule: 1 cap(s) orally once a day  melatonin 3 mg oral tablet: 2 tab(s) orally once a day (at bedtime)  metoprolol succinate 50 mg oral tablet, extended release: 1 tab(s) orally once a day  mirtazapine 7.5 mg oral tablet: 1 orally once a day  MONTELUKAST SODIUM 10MG TABLET: TAKE 1 TABLET (10 MG) BY MOUTH DAILY  Multiple Vitamins oral tablet: 1 tab(s) orally once a day  nystatin 100,000 units/mL oral suspension: 5 milliliter(s) orally 4 times a day  OMEPRAZOLE-SODIUM BICARBONATE 40-1680MG PACK: 1 PACKET ORALLY DAILY MIXED WITH 5 TO 10 ML OF WATER ON AN EMPTY STOMACH  outpatient physical therapy 3 x a week for 6 weeks:   Physical therapy: 3-5x /week MDD: 1  polyethylene glycol 3350 oral powder for reconstitution: 17 gram(s) orally 2 times a day  ProAir HFA 90 mcg/inh inhalation aerosol: 2 puff(s) inhaled every 6 hours, As Needed  sennosides-docusate 8.6 mg-50 mg oral tablet: 2 tab(s) orally once a day (at bedtime), As Needed  sodium chloride 3% inhalation solution: 1 inhaled 3 times a day  Symbicort 160 mcg-4.5 mcg/inh inhalation aerosol: 2 puff(s) inhaled 2 times a day  tamsulosin 0.4 mg oral capsule: 1 cap(s) orally once a day (at bedtime)   acetylcysteine 10% inhalation solution: 4 milliliter(s) inhaled 2 times a day  Admelog SoloStar 100 units/mL injectable solution: 2 unit(s) injectable 3 times a day (before meals) (depending on reading).  ALBUTEROL SULFATE (2.5 MG/3ML)0.083% NEBULIZER: USE 1 UNIT DOSE EVERY 4-6 HOURS AS NEEDED FOR WHEEZING .  AMLODIPINE BESYLATE 5MG TABLET: TAKE 1 TABLET (5 MG) BY MOUTH DAILY FOR HIGH BLOOD PRESSURE  AVAPS ICD-10 J96.02 for Acute respiratory failure with hypercapnia; ICD-10 J47 for Bronchiectasis: FIO2: 35%  Expiratory pressure: 8  Back up rate: 18  Tidal volume: 450  Maximum pressure: 30  Minimum pressure: 20  azithromycin 500 mg oral tablet: 1 tab(s) orally Monday, Wednesday, and Friday  bisacodyl 5 mg oral delayed release tablet: 1 tab(s) orally once daily as needed  Calcium 600+D 600 mg-5 mcg (200 intl units) oral tablet: 1 tab(s) orally 2 times a day  CYANOCOBALAMIN 1000MCG/ML SOLUTION: ADMINISTER 1 MILLILITER (1,000 MCG) SUBCUTANEOUS EVERY 7 DAYS  FERROUS GLUCONATE 324 (38 FE)MG TABLET: TAKE 1 TABLET BY MOUTH 2 TIMES PER DAY BETWEEN MEALS IN WATER OR JUICE  Flonase 50 mcg/inh nasal spray: 1 spray(s) in each nostril once a day as needed  GNP GAS RELIEF 80 MG ORAL TABLET CHEWABLE: TAKE 1 TABLET BY MOUTH 4 TIMES PER DAY AFTER MEALS AND AT BEDTIME AS NEEDED FOR GAS  insulin glargine 100 units/mL subcutaneous solution: 6 unit(s) subcutaneous once a day (at bedtime)  ketotifen 0.025% ophthalmic solution: 1 drop(s) to each affected eye 2 times a day as needed  lactobacillus acidophilus oral capsule: 1 cap(s) orally once a day  melatonin 3 mg oral tablet: 2 tab(s) orally once a day (at bedtime)  metoprolol succinate 50 mg oral tablet, extended release: 1 tab(s) orally once a day  mirtazapine 7.5 mg oral tablet: 1 orally once a day  MONTELUKAST SODIUM 10MG TABLET: TAKE 1 TABLET (10 MG) BY MOUTH DAILY  Multiple Vitamins oral tablet: 1 tab(s) orally once a day  nystatin 100,000 units/mL oral suspension: 5 milliliter(s) orally 4 times a day  OMEPRAZOLE-SODIUM BICARBONATE 40-1680MG PACK: 1 PACKET ORALLY DAILY MIXED WITH 5 TO 10 ML OF WATER ON AN EMPTY STOMACH  outpatient physical therapy 3 x a week for 6 weeks:   Physical therapy: 3-5x /week MDD: 1  polyethylene glycol 3350 oral powder for reconstitution: 17 gram(s) orally 2 times a day  ProAir HFA 90 mcg/inh inhalation aerosol: 2 puff(s) inhaled every 6 hours, As Needed  sennosides-docusate 8.6 mg-50 mg oral tablet: 2 tab(s) orally once a day (at bedtime), As Needed  sodium chloride 3% inhalation solution: 1 inhaled 3 times a day  Symbicort 160 mcg-4.5 mcg/inh inhalation aerosol: 2 puff(s) inhaled 2 times a day  tamsulosin 0.4 mg oral capsule: 1 cap(s) orally once a day (at bedtime)   acetylcysteine 10% inhalation solution: 4 milliliter(s) inhaled 2 times a day  Admelog SoloStar 100 units/mL injectable solution: 2 unit(s) injectable 3 times a day (before meals) (depending on reading).  ALBUTEROL SULFATE (2.5 MG/3ML)0.083% NEBULIZER: USE 1 UNIT DOSE EVERY 4-6 HOURS AS NEEDED FOR WHEEZING .  AMLODIPINE BESYLATE 5MG TABLET: TAKE 1 TABLET (5 MG) BY MOUTH DAILY FOR HIGH BLOOD PRESSURE  AVAPS ICD-10 J96.02 for Acute respiratory failure with hypercapnia; ICD-10 J47 for Bronchiectasis: FIO2: 35%  Expiratory pressure: 8  Back up rate: 18  Tidal volume: 450  Maximum pressure: 30  Minimum pressure: 20  azithromycin 500 mg oral tablet: 1 tab(s) orally Monday, Wednesday, and Friday  bisacodyl 5 mg oral delayed release tablet: 1 tab(s) orally once daily as needed  Calcium 600+D 600 mg-5 mcg (200 intl units) oral tablet: 1 tab(s) orally 2 times a day  CYANOCOBALAMIN 1000MCG/ML SOLUTION: ADMINISTER 1 MILLILITER (1,000 MCG) SUBCUTANEOUS EVERY 7 DAYS  FERROUS GLUCONATE 324 (38 FE)MG TABLET: TAKE 1 TABLET BY MOUTH 2 TIMES PER DAY BETWEEN MEALS IN WATER OR JUICE  Flonase 50 mcg/inh nasal spray: 1 spray(s) in each nostril once a day as needed  GNP GAS RELIEF 80 MG ORAL TABLET CHEWABLE: TAKE 1 TABLET BY MOUTH 4 TIMES PER DAY AFTER MEALS AND AT BEDTIME AS NEEDED FOR GAS  insulin glargine 100 units/mL subcutaneous solution: 6 unit(s) subcutaneous once a day (at bedtime)  ketotifen 0.025% ophthalmic solution: 1 drop(s) to each affected eye 2 times a day as needed  lactobacillus acidophilus oral capsule: 1 cap(s) orally once a day  melatonin 3 mg oral tablet: 2 tab(s) orally once a day (at bedtime)  metoprolol succinate 50 mg oral tablet, extended release: 1 tab(s) orally once a day  mirtazapine 7.5 mg oral tablet: 1 orally once a day  MONTELUKAST SODIUM 10MG TABLET: TAKE 1 TABLET (10 MG) BY MOUTH DAILY  Multiple Vitamins oral tablet: 1 tab(s) orally once a day  nystatin 100,000 units/mL oral suspension: 5 milliliter(s) orally 4 times a day  OMEPRAZOLE-SODIUM BICARBONATE 40-1680MG PACK: 1 PACKET ORALLY DAILY MIXED WITH 5 TO 10 ML OF WATER ON AN EMPTY STOMACH  outpatient physical therapy: 3 times a week for 8 weeks  outpatient physical therapy 3 x a week for 6 weeks:   Physical therapy: 3-5x /week MDD: 1  polyethylene glycol 3350 oral powder for reconstitution: 17 gram(s) orally 2 times a day  ProAir HFA 90 mcg/inh inhalation aerosol: 2 puff(s) inhaled every 6 hours, As Needed  sennosides-docusate 8.6 mg-50 mg oral tablet: 2 tab(s) orally once a day (at bedtime), As Needed  sodium chloride 3% inhalation solution: 1 inhaled 3 times a day  Symbicort 160 mcg-4.5 mcg/inh inhalation aerosol: 2 puff(s) inhaled 2 times a day  tamsulosin 0.4 mg oral capsule: 1 cap(s) orally once a day (at bedtime)   acetylcysteine 10% inhalation solution: 4 milliliter(s) inhaled 2 times a day  Admelog SoloStar 100 units/mL injectable solution: 2 unit(s) injectable 3 times a day (before meals) (depending on reading).  ALBUTEROL SULFATE (2.5 MG/3ML)0.083% NEBULIZER: USE 1 UNIT DOSE EVERY 4-6 HOURS AS NEEDED FOR WHEEZING .  AMLODIPINE BESYLATE 5MG TABLET: TAKE 1 TABLET (5 MG) BY MOUTH DAILY FOR HIGH BLOOD PRESSURE  AVAPS ICD-10 J96.02 for Acute respiratory failure with hypercapnia; ICD-10 J47 for Bronchiectasis: FIO2: 35%  Expiratory pressure: 8  Back up rate: 18  Tidal volume: 450  Maximum pressure: 30  Minimum pressure: 20  azithromycin 500 mg oral tablet: 1 tab(s) orally Monday, Wednesday, and Friday  bisacodyl 5 mg oral delayed release tablet: 1 tab(s) orally once daily as needed  Calcium 600+D 600 mg-5 mcg (200 intl units) oral tablet: 1 tab(s) orally 2 times a day  CYANOCOBALAMIN 1000MCG/ML SOLUTION: ADMINISTER 1 MILLILITER (1,000 MCG) SUBCUTANEOUS EVERY 7 DAYS  FERROUS GLUCONATE 324 (38 FE)MG TABLET: TAKE 1 TABLET BY MOUTH 2 TIMES PER DAY BETWEEN MEALS IN WATER OR JUICE  Flonase 50 mcg/inh nasal spray: 1 spray(s) in each nostril once a day as needed  GNP GAS RELIEF 80 MG ORAL TABLET CHEWABLE: TAKE 1 TABLET BY MOUTH 4 TIMES PER DAY AFTER MEALS AND AT BEDTIME AS NEEDED FOR GAS  insulin glargine 100 units/mL subcutaneous solution: 6 unit(s) subcutaneous once a day (at bedtime)  ketotifen 0.025% ophthalmic solution: 1 drop(s) to each affected eye 2 times a day as needed  lactobacillus acidophilus oral capsule: 1 cap(s) orally once a day  melatonin 3 mg oral tablet: 2 tab(s) orally once a day (at bedtime)  metoprolol succinate 50 mg oral tablet, extended release: 1 tab(s) orally once a day  mirtazapine 7.5 mg oral tablet: 1 orally once a day  MONTELUKAST SODIUM 10MG TABLET: TAKE 1 TABLET (10 MG) BY MOUTH DAILY  Multiple Vitamins oral tablet: 1 tab(s) orally once a day  nystatin 100,000 units/mL oral suspension: 5 milliliter(s) orally 4 times a day  OMEPRAZOLE-SODIUM BICARBONATE 40-1680MG PACK: 1 PACKET ORALLY DAILY MIXED WITH 5 TO 10 ML OF WATER ON AN EMPTY STOMACH  outpatient physical therapy: 3 times a week for 8 weeks  outpatient physical therapy 3 x a week for 6 weeks:   polyethylene glycol 3350 oral powder for reconstitution: 17 gram(s) orally 2 times a day  ProAir HFA 90 mcg/inh inhalation aerosol: 2 puff(s) inhaled every 6 hours, As Needed  sennosides-docusate 8.6 mg-50 mg oral tablet: 2 tab(s) orally once a day (at bedtime), As Needed  sodium chloride 3% inhalation solution: 1 inhaled 3 times a day  Symbicort 160 mcg-4.5 mcg/inh inhalation aerosol: 2 puff(s) inhaled 2 times a day  tamsulosin 0.4 mg oral capsule: 1 cap(s) orally once a day (at bedtime)   acetylcysteine 10% inhalation solution: 4 milliliter(s) inhaled 2 times a day  Admelog SoloStar 100 units/mL injectable solution: 2 unit(s) injectable 3 times a day (before meals) (depending on reading).  ALBUTEROL SULFATE (2.5 MG/3ML)0.083% NEBULIZER: USE 1 UNIT DOSE EVERY 4-6 HOURS AS NEEDED FOR WHEEZING .  AMLODIPINE BESYLATE 5MG TABLET: TAKE 1 TABLET (5 MG) BY MOUTH DAILY FOR HIGH BLOOD PRESSURE  AVAPS ICD-10 J96.02 for Acute respiratory failure with hypercapnia; ICD-10 J47 for Bronchiectasis: FIO2: 35%  Expiratory pressure: 8  Back up rate: 18  Tidal volume: 450  Maximum pressure: 30  Minimum pressure: 20  azithromycin 500 mg oral tablet: 1 tab(s) orally Monday, Wednesday, and Friday  bisacodyl 5 mg oral delayed release tablet: 1 tab(s) orally once daily as needed  Calcium 600+D 600 mg-5 mcg (200 intl units) oral tablet: 1 tab(s) orally 2 times a day  CYANOCOBALAMIN 1000MCG/ML SOLUTION: ADMINISTER 1 MILLILITER (1,000 MCG) SUBCUTANEOUS EVERY 7 DAYS  FERROUS GLUCONATE 324 (38 FE)MG TABLET: TAKE 1 TABLET BY MOUTH 2 TIMES PER DAY BETWEEN MEALS IN WATER OR JUICE  Flonase 50 mcg/inh nasal spray: 1 spray(s) in each nostril once a day as needed  GNP GAS RELIEF 80 MG ORAL TABLET CHEWABLE: TAKE 1 TABLET BY MOUTH 4 TIMES PER DAY AFTER MEALS AND AT BEDTIME AS NEEDED FOR GAS  insulin glargine 100 units/mL subcutaneous solution: 6 unit(s) subcutaneous once a day (at bedtime)  ketotifen 0.025% ophthalmic solution: 1 drop(s) to each affected eye 2 times a day as needed  lactobacillus acidophilus oral capsule: 1 cap(s) orally once a day  melatonin 3 mg oral tablet: 2 tab(s) orally once a day (at bedtime)  metoprolol succinate 50 mg oral tablet, extended release: 1 tab(s) orally once a day  mirtazapine 7.5 mg oral tablet: 1 orally once a day  MONTELUKAST SODIUM 10MG TABLET: TAKE 1 TABLET (10 MG) BY MOUTH DAILY  Multiple Vitamins oral tablet: 1 tab(s) orally once a day  nystatin 100,000 units/mL oral suspension: 5 milliliter(s) orally 4 times a day  OMEPRAZOLE-SODIUM BICARBONATE 40-1680MG PACK: 1 PACKET ORALLY DAILY MIXED WITH 5 TO 10 ML OF WATER ON AN EMPTY STOMACH  outpatient physical therapy: 3 times a week for 8 weeks  polyethylene glycol 3350 oral powder for reconstitution: 17 gram(s) orally 2 times a day  sennosides-docusate 8.6 mg-50 mg oral tablet: 2 tab(s) orally once a day (at bedtime), As Needed  sodium chloride 3% inhalation solution: 1 inhaled 3 times a day  Symbicort 160 mcg-4.5 mcg/inh inhalation aerosol: 2 puff(s) inhaled 2 times a day  tamsulosin 0.4 mg oral capsule: 1 cap(s) orally once a day (at bedtime)   acetylcysteine 10% inhalation solution: 4 milliliter(s) inhaled 2 times a day  Admelog SoloStar 100 units/mL injectable solution: 2 unit(s) injectable 3 times a day (before meals) (depending on reading).  ALBUTEROL SULFATE (2.5 MG/3ML)0.083% NEBULIZER: 1 unit(s) inhaled every 6 hours as needed for  shortness of breath and/or wheezing USE 1 UNIT DOSE EVERY 4-6 HOURS AS NEEDED FOR WHEEZING .  AMLODIPINE BESYLATE 5MG TABLET: TAKE 1 TABLET (5 MG) BY MOUTH DAILY FOR HIGH BLOOD PRESSURE  AVAPS ICD-10 J96.02 for Acute respiratory failure with hypercapnia; ICD-10 J47 for Bronchiectasis: FIO2: 35%  Expiratory pressure: 8  Back up rate: 18  Tidal volume: 450  Maximum pressure: 30  Minimum pressure: 20  azithromycin 500 mg oral tablet: 1 tab(s) orally Monday, Wednesday, and Friday  bisacodyl 5 mg oral delayed release tablet: 1 tab(s) orally once daily as needed  Calcium 600+D 600 mg-5 mcg (200 intl units) oral tablet: 1 tab(s) orally 2 times a day  CYANOCOBALAMIN 1000MCG/ML SOLUTION: ADMINISTER 1 MILLILITER (1,000 MCG) SUBCUTANEOUS EVERY 7 DAYS  FERROUS GLUCONATE 324 (38 FE)MG TABLET: TAKE 1 TABLET BY MOUTH 2 TIMES PER DAY BETWEEN MEALS IN WATER OR JUICE  Flonase 50 mcg/inh nasal spray: 1 spray(s) in each nostril once a day as needed  GNP GAS RELIEF 80 MG ORAL TABLET CHEWABLE: TAKE 1 TABLET BY MOUTH 4 TIMES PER DAY AFTER MEALS AND AT BEDTIME AS NEEDED FOR GAS  insulin glargine 100 units/mL subcutaneous solution: 6 unit(s) subcutaneous once a day (at bedtime)  ketotifen 0.025% ophthalmic solution: 1 drop(s) to each affected eye 2 times a day as needed  lactobacillus acidophilus oral capsule: 1 cap(s) orally once a day  melatonin 3 mg oral tablet: 2 tab(s) orally once a day (at bedtime)  metoprolol succinate 50 mg oral tablet, extended release: 1 tab(s) orally once a day  mirtazapine 7.5 mg oral tablet: 1 orally once a day  MONTELUKAST SODIUM 10MG TABLET: TAKE 1 TABLET (10 MG) BY MOUTH DAILY  Multiple Vitamins oral tablet: 1 tab(s) orally once a day  nystatin 100,000 units/mL oral suspension: 5 milliliter(s) orally 4 times a day  OMEPRAZOLE-SODIUM BICARBONATE 40-1680MG PACK: 1 PACKET ORALLY DAILY MIXED WITH 5 TO 10 ML OF WATER ON AN EMPTY STOMACH  outpatient physical therapy: 3 times a week for 8 weeks  polyethylene glycol 3350 oral powder for reconstitution: 17 gram(s) orally 2 times a day  sennosides-docusate 8.6 mg-50 mg oral tablet: 2 tab(s) orally once a day (at bedtime), As Needed  sodium chloride 3% inhalation solution: 1 inhaled 3 times a day  tamsulosin 0.4 mg oral capsule: 1 cap(s) orally once a day (at bedtime)   acetylcysteine 10% inhalation solution: 4 milliliter(s) inhaled 2 times a day  Admelog SoloStar 100 units/mL injectable solution: 2 unit(s) injectable 3 times a day (before meals) (depending on reading).  amLODIPine 5 mg oral tablet: 1 tab(s) orally once a day  AVAPS ICD-10 J96.02 for Acute respiratory failure with hypercapnia; ICD-10 J47 for Bronchiectasis: FIO2: 35%  Expiratory pressure: 8  Back up rate: 18  Tidal volume: 450  Maximum pressure: 30  Minimum pressure: 20  azithromycin 500 mg oral tablet: 1 tab(s) orally Monday, Wednesday, and Friday  bisacodyl 5 mg oral delayed release tablet: 1 tab(s) orally once daily as needed  Calcium 600+D 600 mg-5 mcg (200 intl units) oral tablet: 1 tab(s) orally 2 times a day  CYANOCOBALAMIN 1000MCG/ML SOLUTION: ADMINISTER 1 MILLILITER (1,000 MCG) SUBCUTANEOUS EVERY 7 DAYS  FERROUS GLUCONATE 324 (38 FE)MG TABLET: TAKE 1 TABLET BY MOUTH 2 TIMES PER DAY BETWEEN MEALS IN WATER OR JUICE  Flonase 50 mcg/inh nasal spray: 1 spray(s) in each nostril once a day as needed  Flonase Allergy Relief 50 mcg/inh nasal spray: 1 spray(s) in each nostril once a day as needed for  congestion  GNP GAS RELIEF 80 MG ORAL TABLET CHEWABLE: TAKE 1 TABLET BY MOUTH 4 TIMES PER DAY AFTER MEALS AND AT BEDTIME AS NEEDED FOR GAS  insulin glargine 100 units/mL subcutaneous solution: 6 unit(s) subcutaneous once a day (at bedtime)  ipratropium-albuterol 0.5 mg-2.5 mg/3 mL inhalation solution: 3 milliliter(s) by nebulizer every 6 hours Nebulizer every 12 hours.  ketotifen 0.025% ophthalmic solution: 1 drop(s) to each affected eye 2 times a day as needed  lactobacillus acidophilus oral capsule: 1 cap(s) orally once a day  melatonin 3 mg oral tablet: 2 tab(s) orally once a day (at bedtime)  metoprolol succinate 50 mg oral tablet, extended release: 1 tab(s) orally once a day  mirtazapine 7.5 mg oral tablet: 1 orally once a day  MONTELUKAST SODIUM 10MG TABLET: TAKE 1 TABLET (10 MG) BY MOUTH DAILY  Multiple Vitamins oral tablet: 1 tab(s) orally once a day  nystatin 100,000 units/mL oral suspension: 5 milliliter(s) orally 4 times a day  OMEPRAZOLE-SODIUM BICARBONATE 40-1680MG PACK: 1 PACKET ORALLY DAILY MIXED WITH 5 TO 10 ML OF WATER ON AN EMPTY STOMACH  outpatient physical therapy: 3 times a week for 8 weeks  polyethylene glycol 3350 oral powder for reconstitution: 17 gram(s) orally 2 times a day  ProAir HFA 90 mcg/inh inhalation aerosol: 1 puff(s) inhaled every 6 hours as needed for  shortness of breath and/or wheezing  sennosides-docusate 8.6 mg-50 mg oral tablet: 2 tab(s) orally once a day (at bedtime), As Needed  Sodium Chloride 1 g oral tablet: 1 tab(s) orally 3 times a day  sodium chloride 3% inhalation solution: 1 unit(s) inhaled 3 times a day  Symbicort 160 mcg-4.5 mcg/inh inhalation aerosol: 2 puff(s) inhaled 2 times a day  tamsulosin 0.4 mg oral capsule: 1 cap(s) orally once a day (at bedtime)

## 2023-05-20 NOTE — DIETITIAN INITIAL EVALUATION ADULT - OTHER INFO
Medical course: Per chart 68 year old female with hx of bronchiectasis iso primary ciliary diskinesia on home AVAPS and home o2 is here today for acute on chronic hypoxemic hypercapneic respiratory failure in the setting of suspected pna.    Nutrition interview: Pt A&Ox2, collateral obtained from RN and chart review. No recent episodes of nausea, vomiting, diarrhea or constipation, last BM noted on 5/14 per RN flowsheets. Per discussion with RN Pt with small BM today. No reported chewing/swallowing difficulties. No known food allergies, preference for no beef, no pork. UBW per [facility] HIE #. Per RN intake of breakfast this morning was fair 50-75%. Pt currently NPO 2/2 labored breathing and receiving breathing treatments. Per GOC note 5/20 Pt DNR/DNI. Feeding skills: assistance. Pt on 1000ml fluid restriction 2/2 hyponatremia also ordered for sodium chloride tabs.  Observed with severe muscle/fat wasting when transporting in hallway to new room.

## 2023-05-20 NOTE — PROGRESS NOTE ADULT - PROBLEM SELECTOR PLAN 3
Suspect SIADH based on prior as well as significant lung pathology; recent nausea/vomiting  Absolute limit 131 at 5am 5/20    Plan:  BMP q6h  c/w Salt Tabs 1g TID  500cc NS suspect hypotonic hypovolemic hypoNa given improvement s/p IVF boluses    Plan:  BMP q6h  c/w Salt Tabs 1g TID can wean if hypoNa improving w/ fluid suspect hypotonic hypovolemic hypoNa given improvement s/p IVF boluses    Plan:  BMP q6h (5/20 128 (from 123 24h prior) will avoid correction exceeding 6-8 meq/24h  c/w Salt Tabs 1g TID can wean if hypoNa improving w/ fluid

## 2023-05-20 NOTE — PROGRESS NOTE ADULT - PROBLEM SELECTOR PLAN 1
Patient on AVAPS at home  VBG showed improvement after NIPPV/AVAPS  MICU was consulted in the ED; determined no ICU needs at the time  Patient does not appear overloaded; no diuresis indicated FRANCES    Plan:   Duonebs q6h  AVAPS at night  Aggressive pulmonary Toilet iso Primary Ciliary Diskinesia    Appreciate Pulm Recs  - Aggressive pulmonary Toilet  - Infectious workup per ID    Appreciate ID Recs:  - Meropenem 1g IV q8h  - [id number] mg po q12h  - F.U Sputum Cultures Patient on AVAPS at home  VBG showed improvement after NIPPV/AVAPS  MICU was consulted in the ED; determined no ICU needs at the time  Patient does not appear overloaded; no diuresis indicated FRANCES  5/20 AM pt in tripod position w/ increased WOB, VBG 7.27/86/75/42 lact 0.9 -> 7.30/90/66/44 lact 0.7; placed NIPPV via AVAPS, will f/u afternoon gas and pulm recs     Plan:   Duonebs q6h  AVAPS at night  Aggressive pulmonary Toilet iso Primary Ciliary Diskinesia    Appreciate Pulm Recs  - Aggressive pulmonary Toilet  - Infectious workup per ID    Appreciate ID Recs:  - Meropenem 1g IV q8h  - [id number] mg po q12h  - F.U Sputum Cultures

## 2023-05-20 NOTE — PROGRESS NOTE ADULT - CONVERSATION DETAILS
Pt's family was called this morning given acute change in status. Her respiratory status appear to be worsening based on clinical assessment. Concern that pt is decompensating. Spoke with pt's spouse and son on the phone initially to review course and team's current concerns. ADvised that pt will be palced back on AVAPS, but should she not respond to that, the next step would be intubation. The pt's family made it clear that the pt did not want to be intubated. They understand that if she were to be placed on the ventilator, it may be hard to get her off it. Furthermore, family is aware of the chronic and irrversible nature of her pulmonary disease. As such, they were all in agreement with the plan to keep pt on AVAPS as she can tolerate. No palns to escalate care - confirmed DNR/DNI status with family.    Family later arrived to hospital to see pt. We again dsicussed her tenuous status. We again confirmed the code status as DNR/DNI. All other questions/concerns were addressed. In the mean time - we will cont w/ steroid, abx, and other supportive measures. Should pt cont to deteriorate, it would be reasonable to discuss other comfort measures including morphine/anxiolytics for SYMPTOMATIC management and comfort measures only.

## 2023-05-20 NOTE — PROGRESS NOTE ADULT - ASSESSMENT
69 yo F PMH PCD c/b bronchiectasis w/ multiple admissions for bronchiectasis exacerbation, HTN, COVID infection 1/2022, Pseudomonas and ESBL klebsiella, chronic hypoxemic on 2-4LNC and hypercapneic respiratory failure on AVAPS referred from outpatient pulmonologist for worsening mental status and lethargy found to be severely hyponatremia. Pulmonary consulted for bronchiectasis management and hypercarbic respiratory failure    - recommend empiric abx, ID recommendations appreciated  - F/u sputum culture  - Patient's hypercapnia improved, now back on NC from AVAPS  - Would continue AVAPS qhs and prn  - c/w supplemental O2, goal 90-92%  - Sodium improving  - please maintain on q6 duonebs, q12 hypersal, mucomyst, and aerobika; chest PT and physical therapy  - Chest PT performed by this provider at bedside  - TTE reviewed  - would recommend LE duplex for asymmetric foot edema  - consider palliative care evaluation, patient is now DNR/DNI  - pulmonary will follow    Carson Ramsay, PGY-7  Pulmonary and Critical Care Medicine  [id number]

## 2023-05-20 NOTE — DIETITIAN INITIAL EVALUATION ADULT - LITERATURE/VIDEOS GIVEN
Nutrition education not appropriate at this time given recent events per discussion with RN and Pt NPO.

## 2023-05-20 NOTE — DISCHARGE NOTE PROVIDER - NSDCFUADDAPPT_GEN_ALL_CORE_FT
Please follow up with your pulmonologist Dr. Young within 1 week  Please follow up with you primary care doctor, Dr. Sinclair in 1-2 weeks

## 2023-05-20 NOTE — DIETITIAN INITIAL EVALUATION ADULT - PROBLEM SELECTOR PLAN 1
Patient on AVAPS at home  VBG showed improvement after NIPPV/AVAPS  MICU was consulted in the ED; determined no ICU needs at the time  Patient does not appear overloaded; no diuresis indicated FRANCES    Plan:   Duonebs q6h  AVAPS at night  Aggressive pulmonary Toilet iso Primary Ciliary Diskinesia  Pulm Consulted; appreciate recs

## 2023-05-20 NOTE — DIETITIAN INITIAL EVALUATION ADULT - PERTINENT LABORATORY DATA
05-20    128<L>  |  85<L>  |  7   ----------------------------<  151<H>  4.3   |  36<H>  |  0.28<L>    Ca    9.5      20 May 2023 07:30  Phos  2.0     05-20  Mg     1.70     05-20    TPro  6.6  /  Alb  3.3  /  TBili  0.2  /  DBili  x   /  AST  35<H>  /  ALT  27  /  AlkPhos  152<H>  05-20  POCT Blood Glucose.: 244 mg/dL (05-20-23 @ 12:30)  A1C with Estimated Average Glucose Result: 8.8 % (03-30-23 @ 09:45)  A1C with Estimated Average Glucose Result: 9.4 % (09-29-22 @ 05:00)

## 2023-05-20 NOTE — PROGRESS NOTE ADULT - SUBJECTIVE AND OBJECTIVE BOX
Regina Escamilla MD PGY-3  Internal Medicine Resident    CHIEF COMPLAINT: Patient is a 68y old  Female who presents with a chief complaint of Acute Hypercapneic Hypoxemic Respiratory failure (20 May 2023 05:38)      INTERVAL HPI/OVERNIGHT EVENTS: BALJEET ON, BP 140s/60s, RR 18-21 w/ Spo2% 96-99 on 3.5L supplemental O2 via NC.    MEDICATIONS (STANDING):  acetylcysteine 10%  Inhalation 4 milliLiter(s) Inhalation daily  albuterol/ipratropium for Nebulization 3 milliLiter(s) Nebulizer once  albuterol/ipratropium for Nebulization 3 milliLiter(s) Nebulizer every 6 hours  amLODIPine   Tablet 5 milliGRAM(s) Oral daily  budesonide 160 MICROgram(s)/formoterol 4.5 MICROgram(s) Inhaler 2 Puff(s) Inhalation two times a day  ciprofloxacin     Tablet 750 milliGRAM(s) Oral two times a day  dextrose 5%. 1000 milliLiter(s) IV Continuous <Continuous>  dextrose 5%. 1000 milliLiter(s) IV Continuous <Continuous>  dextrose 50% Injectable 12.5 Gram(s) IV Push once  dextrose 50% Injectable 25 Gram(s) IV Push once  dextrose 50% Injectable 25 Gram(s) IV Push once  enoxaparin Injectable 30 milliGRAM(s) SubCutaneous every 24 hours  glucagon  Injectable 1 milliGRAM(s) IntraMuscular once  insulin glargine Injectable (LANTUS) 4 Unit(s) SubCutaneous every morning  insulin lispro (ADMELOG) corrective regimen sliding scale   SubCutaneous at bedtime  insulin lispro (ADMELOG) corrective regimen sliding scale   SubCutaneous three times a day before meals  insulin lispro Injectable (ADMELOG) 2 Unit(s) SubCutaneous three times a day before meals  ketotifen 0.025% Ophthalmic Solution 1 Drop(s) Both EYES two times a day  meropenem  IVPB 1000 milliGRAM(s) IV Intermittent every 8 hours  metoprolol succinate ER 50 milliGRAM(s) Oral daily  mirtazapine 7.5 milliGRAM(s) Oral at bedtime  nystatin    Suspension 168209 Unit(s) Oral four times a day  pantoprazole    Tablet 40 milliGRAM(s) Oral before breakfast  polyethylene glycol 3350 17 Gram(s) Oral daily  senna 2 Tablet(s) Oral at bedtime  simethicone 80 milliGRAM(s) Chew daily  sodium chloride 1 Gram(s) Oral three times a day  sodium chloride 3%  Inhalation 4 milliLiter(s) Inhalation every 12 hours    MEDICATIONS  (PRN):  bisacodyl 5 milliGRAM(s) Oral every 12 hours PRN  dextrose Oral Gel 15 Gram(s) Oral once PRN  fluticasone propionate 50 MICROgram(s)/spray Nasal Spray 1 Spray(s) Both Nostrils two times a day PRN      REVIEW OF SYSTEMS:      T(F): 97.5 (23 @ 01:32), Max: 98.2 (23 @ 09:15)  HR: 90 (23 @ 06:39) (83 - 96)  BP: 148/61 (23 @ 01:32) (142/63 - 155/60)  RR: 21 (23 @ 01:32) (18 - 21)  SpO2: 95% (23 @ 06:39) (95% - 100%)  Wt(kg): --  CAPILLARY BLOOD GLUCOSE      POCT Blood Glucose.: 153 mg/dL (19 May 2023 23:30)  POCT Blood Glucose.: 154 mg/dL (19 May 2023 17:18)  POCT Blood Glucose.: 163 mg/dL (19 May 2023 11:52)  POCT Blood Glucose.: 239 mg/dL (19 May 2023 08:18)    I&O's Summary    18 May 2023 07:01  -  19 May 2023 07:00  --------------------------------------------------------  IN: 0 mL / OUT: 1120 mL / NET: -1120 mL    19 May 2023 07:01  -  20 May 2023 06:43  --------------------------------------------------------  IN: 0 mL / OUT: 550 mL / NET: -550 mL        PHYSICAL EXAM:      LABS:                        10.8   10.25 )-----------( 131      ( 19 May 2023 06:00 )             32.9     05-20    123<L>  |  84<L>  |  10  ----------------------------<  138<H>  4.9   |  34<H>  |  0.27<L>    Ca    9.2      20 May 2023 00:33  Phos  3.1     05-20  Mg     1.80     -    TPro  6.4  /  Alb  3.3  /  TBili  0.2  /  DBili  x   /  AST  22  /  ALT  15  /  AlkPhos  154<H>  -19      Urinalysis Basic - ( 18 May 2023 12:38 )    Color: Yellow / Appearance: Slightly Turbid / S.017 / pH: x  Gluc: x / Ketone: Small  / Bili: Negative / Urobili: <2 mg/dL   Blood: x / Protein: 100 mg/dL / Nitrite: Negative   Leuk Esterase: Negative / RBC: 7 /HPF / WBC 4 /HPF   Sq Epi: x / Non Sq Epi: x / Bacteria: Moderate          RADIOLOGY & ADDITIONAL TESTS:       Regina Escamilla MD PGY-3  Internal Medicine Resident    CHIEF COMPLAINT: Patient is a 68y old  Female who presents with a chief complaint of Acute Hypercapneic Hypoxemic Respiratory failure (20 May 2023 05:38)      INTERVAL HPI/OVERNIGHT EVENTS: BALJEET ON, BP 140s/60s, RR 18-21 w/ Spo2% 96-99 on 3.5L supplemental O2 via NC. Pt assessed at bedside w/ assistance by [employer]  (Colleen) Magan ID [id number] however pt not participant in interview (seemingly altered mentation)    MEDICATIONS (STANDING):  acetylcysteine 10%  Inhalation 4 milliLiter(s) Inhalation daily  albuterol/ipratropium for Nebulization 3 milliLiter(s) Nebulizer once  albuterol/ipratropium for Nebulization 3 milliLiter(s) Nebulizer every 6 hours  amLODIPine   Tablet 5 milliGRAM(s) Oral daily  budesonide 160 MICROgram(s)/formoterol 4.5 MICROgram(s) Inhaler 2 Puff(s) Inhalation two times a day  ciprofloxacin     Tablet 750 milliGRAM(s) Oral two times a day  dextrose 5%. 1000 milliLiter(s) IV Continuous <Continuous>  dextrose 5%. 1000 milliLiter(s) IV Continuous <Continuous>  dextrose 50% Injectable 12.5 Gram(s) IV Push once  dextrose 50% Injectable 25 Gram(s) IV Push once  dextrose 50% Injectable 25 Gram(s) IV Push once  enoxaparin Injectable 30 milliGRAM(s) SubCutaneous every 24 hours  glucagon  Injectable 1 milliGRAM(s) IntraMuscular once  insulin glargine Injectable (LANTUS) 4 Unit(s) SubCutaneous every morning  insulin lispro (ADMELOG) corrective regimen sliding scale   SubCutaneous at bedtime  insulin lispro (ADMELOG) corrective regimen sliding scale   SubCutaneous three times a day before meals  insulin lispro Injectable (ADMELOG) 2 Unit(s) SubCutaneous three times a day before meals  ketotifen 0.025% Ophthalmic Solution 1 Drop(s) Both EYES two times a day  meropenem  IVPB 1000 milliGRAM(s) IV Intermittent every 8 hours  metoprolol succinate ER 50 milliGRAM(s) Oral daily  mirtazapine 7.5 milliGRAM(s) Oral at bedtime  nystatin    Suspension 578492 Unit(s) Oral four times a day  pantoprazole    Tablet 40 milliGRAM(s) Oral before breakfast  polyethylene glycol 3350 17 Gram(s) Oral daily  senna 2 Tablet(s) Oral at bedtime  simethicone 80 milliGRAM(s) Chew daily  sodium chloride 1 Gram(s) Oral three times a day  sodium chloride 3%  Inhalation 4 milliLiter(s) Inhalation every 12 hours    MEDICATIONS  (PRN):  bisacodyl 5 milliGRAM(s) Oral every 12 hours PRN  dextrose Oral Gel 15 Gram(s) Oral once PRN  fluticasone propionate 50 MICROgram(s)/spray Nasal Spray 1 Spray(s) Both Nostrils two times a day PRN      REVIEW OF SYSTEMS: Unable to obtain given lack of pt participation as above      T(F): 97.5 (23 @ 01:32), Max: 98.2 (23 @ 09:15)  HR: 90 (23 @ 06:39) (83 - 96)  BP: 148/61 (23 @ 01:32) (142/63 - 155/60)  RR: 21 (23 @ 01:32) (18 - 21)  SpO2: 95% (23 @ 06:39) (95% - 100%)  Wt(kg): --  CAPILLARY BLOOD GLUCOSE      POCT Blood Glucose.: 153 mg/dL (19 May 2023 23:30)  POCT Blood Glucose.: 154 mg/dL (19 May 2023 17:18)  POCT Blood Glucose.: 163 mg/dL (19 May 2023 11:52)  POCT Blood Glucose.: 239 mg/dL (19 May 2023 08:18)    I&O's Summary    18 May 2023 07:01  -  19 May 2023 07:00  --------------------------------------------------------  IN: 0 mL / OUT: 1120 mL / NET: -1120 mL    19 May 2023 07:01  -  20 May 2023 06:43  --------------------------------------------------------  IN: 0 mL / OUT: 550 mL / NET: -550 mL        PHYSICAL EXAM:  GENERAL: Pt in tripod position in bed, in distress 2/2 WOB, well-groomed, well-developed, not participant interview  HEAD: Atraumatic, Normocephalic  EYES: PERRL, conjunctiva and sclera clear  ENMT: +dry MM otherwise difficult to assess oropharynx iso pt participation   NECK: Supple  NERVOUS SYSTEM: Alert & Oriented X0 (awake, following movements, but seemingly confused and not participant in exam), Moving extremities but otherwise difficult to assess sensorimotor exam iso participation   CHEST/LUNG: +diffuse rhonchi b/l similar to previous exam  HEART: Regular rate and rhythm; No murmurs, rubs, or gallops  ABDOMEN: Soft, Nontender, Nondistended; Bowel sounds present. No guarding, rebound tenderness, or rigidity.  EXTREMITIES: 2+ Peripheral Pulses, No edema        LABS:                        10.8   10.25 )-----------( 131      ( 19 May 2023 06:00 )             32.9     05-20    123<L>  |  84<L>  |  10  ----------------------------<  138<H>  4.9   |  34<H>  |  0.27<L>    Ca    9.2      20 May 2023 00:33  Phos  3.1     05-20  Mg     1.80     05-20    TPro  6.4  /  Alb  3.3  /  TBili  0.2  /  DBili  x   /  AST  22  /  ALT  15  /  AlkPhos  154<H>  05-19      Urinalysis Basic - ( 18 May 2023 12:38 )    Color: Yellow / Appearance: Slightly Turbid / S.017 / pH: x  Gluc: x / Ketone: Small  / Bili: Negative / Urobili: <2 mg/dL   Blood: x / Protein: 100 mg/dL / Nitrite: Negative   Leuk Esterase: Negative / RBC: 7 /HPF / WBC 4 /HPF   Sq Epi: x / Non Sq Epi: x / Bacteria: Moderate          RADIOLOGY & ADDITIONAL TESTS:

## 2023-05-20 NOTE — DIETITIAN INITIAL EVALUATION ADULT - PERTINENT MEDS FT
MEDICATIONS  (STANDING):  acetylcysteine 10%  Inhalation 4 milliLiter(s) Inhalation daily  albuterol/ipratropium for Nebulization 3 milliLiter(s) Nebulizer every 6 hours  albuterol/ipratropium for Nebulization 3 milliLiter(s) Nebulizer once  amLODIPine   Tablet 5 milliGRAM(s) Oral daily  budesonide 160 MICROgram(s)/formoterol 4.5 MICROgram(s) Inhaler 2 Puff(s) Inhalation two times a day  ciprofloxacin     Tablet 750 milliGRAM(s) Oral two times a day  dextrose 5%. 1000 milliLiter(s) (50 mL/Hr) IV Continuous <Continuous>  dextrose 5%. 1000 milliLiter(s) (100 mL/Hr) IV Continuous <Continuous>  dextrose 50% Injectable 12.5 Gram(s) IV Push once  dextrose 50% Injectable 25 Gram(s) IV Push once  dextrose 50% Injectable 25 Gram(s) IV Push once  enoxaparin Injectable 30 milliGRAM(s) SubCutaneous every 24 hours  glucagon  Injectable 1 milliGRAM(s) IntraMuscular once  insulin lispro (ADMELOG) corrective regimen sliding scale   SubCutaneous every 6 hours  ketotifen 0.025% Ophthalmic Solution 1 Drop(s) Both EYES two times a day  meropenem  IVPB 1000 milliGRAM(s) IV Intermittent every 8 hours  metoprolol succinate ER 50 milliGRAM(s) Oral daily  mirtazapine 7.5 milliGRAM(s) Oral at bedtime  nystatin    Suspension 374863 Unit(s) Oral four times a day  pantoprazole    Tablet 40 milliGRAM(s) Oral before breakfast  polyethylene glycol 3350 17 Gram(s) Oral daily  senna 2 Tablet(s) Oral at bedtime  simethicone 80 milliGRAM(s) Chew daily  sodium chloride 1 Gram(s) Oral three times a day  sodium chloride 3%  Inhalation 4 milliLiter(s) Inhalation every 12 hours    MEDICATIONS  (PRN):  bisacodyl 5 milliGRAM(s) Oral every 12 hours PRN Constipation  dextrose Oral Gel 15 Gram(s) Oral once PRN Blood Glucose LESS THAN 70 milliGRAM(s)/deciliter  fluticasone propionate 50 MICROgram(s)/spray Nasal Spray 1 Spray(s) Both Nostrils two times a day PRN nasal congestion

## 2023-05-20 NOTE — DISCHARGE NOTE PROVIDER - CARE PROVIDER_API CALL
STEVE ALFONSO  Internal Medicine  [address] [place] [address]  Phone: [phone]  Fax: [phone]  Established Patient  Follow Up Time: 1 week    Shelli Young)  Critical Care Medicine; Pulmonary Disease  [address] [facility], [place] [address]  Phone: [phone]  Fax: [phone]  Established Patient  Follow Up Time: 1 week

## 2023-05-20 NOTE — DIETITIAN INITIAL EVALUATION ADULT - PROBLEM SELECTOR PLAN 3
Suspect SIADH based on prior as well as significant lung pathology; recent nausea/vomiting  Absolute limit 127 at 5am 5/19    Plan:  F/U Urine Studies  Fluid restriction to 1 L/day  BMP q6h

## 2023-05-20 NOTE — PROGRESS NOTE ADULT - SUBJECTIVE AND OBJECTIVE BOX
CHIEF COMPLAINT: Hypercapnic Resp Failure    Interval Events: Overnight events reviewed, maintained on AVAPS throughout the day due to dyspnea. Blood gases reviewed, was more hypercapnic but now pCO2 on VBG has improved to 70s. Patient taken off AVAPS at bedside and chest PT performed, patient coughed up mucous. She reports feeling better.    REVIEW OF SYSTEMS:  Constitutional: No fevers or chills.  Eyes: No itching or discharge from the eyes  ENT: No ear pain. No ear discharge. No nasal congestion.   CV: No chest pain. No palpitations. No lightheadedness or dizziness.   Resp: +Cough, sputum. +Dyspnea, improved.  GI: No nausea. No vomiting. No diarrhea.  MSK: No joint pain or pain in any extremities  Integumentary: No skin lesions. No pedal edema.  Neurological: No gross motor weakness. No sensory changes.  [x] All other systems negative  [ ] Unable to assess ROS because ________    OBJECTIVE:  ICU Vital Signs Last 24 Hrs  T(C): 37.2 (20 May 2023 15:51), Max: 37.2 (20 May 2023 06:30)  T(F): 99 (20 May 2023 15:51), Max: 99 (20 May 2023 06:30)  HR: 92 (20 May 2023 15:51) (83 - 98)  BP: 162/70 (20 May 2023 15:51) (129/60 - 162/70)  BP(mean): --  ABP: --  ABP(mean): --  RR: 18 (20 May 2023 15:51) (18 - 24)  SpO2: 95% (20 May 2023 15:51) (95% - 100%)    O2 Parameters below as of 20 May 2023 15:51  Patient On (Oxygen Delivery Method): mask, nonrebreather          Mode: NIV (Noninvasive Ventilation), RR (machine): 18, TV (machine): 450, FiO2: 35, PEEP: 5, ITime: 0.9, P-High: 25, P-Low: 15, PIP: 24    05-19 @ 07:01  -  05-20 @ 07:00  --------------------------------------------------------  IN: 0 mL / OUT: 550 mL / NET: -550 mL    05-20 @ 07:01  - 05-20 @ 16:02  --------------------------------------------------------  IN: 0 mL / OUT: 600 mL / NET: -600 mL      CAPILLARY BLOOD GLUCOSE      POCT Blood Glucose.: 244 mg/dL (20 May 2023 12:30)      PHYSICAL EXAM:  General: Awake, alert, oriented X 3.   HEENT: Atraumatic, normocephalic.   Lymph Nodes: No palpable lymphadenopathy  Neck: Supple.  Respiratory: Normal chest expansion. Coarse breath sounds and crackles bilaterally.  Cardiovascular: S1 S2 normal. No murmurs, rubs or gallops.   Abdomen: Soft, non-tender, non-distended. No organomegaly.  Extremities: Warm to touch. Peripheral pulse palpable.   Skin: No rashes or skin lesions  Neurological: Motor and sensory examination equal and normal in all four extremities.  Psychiatry: Appropriate mood and affect.    HOSPITAL MEDICATIONS:  MEDICATIONS  (STANDING):  acetylcysteine 10%  Inhalation 4 milliLiter(s) Inhalation daily  albuterol/ipratropium for Nebulization 3 milliLiter(s) Nebulizer once  albuterol/ipratropium for Nebulization 3 milliLiter(s) Nebulizer every 6 hours  amLODIPine   Tablet 5 milliGRAM(s) Oral daily  budesonide 160 MICROgram(s)/formoterol 4.5 MICROgram(s) Inhaler 2 Puff(s) Inhalation two times a day  ciprofloxacin     Tablet 750 milliGRAM(s) Oral two times a day  dextrose 5%. 1000 milliLiter(s) (100 mL/Hr) IV Continuous <Continuous>  dextrose 5%. 1000 milliLiter(s) (50 mL/Hr) IV Continuous <Continuous>  dextrose 50% Injectable 25 Gram(s) IV Push once  dextrose 50% Injectable 25 Gram(s) IV Push once  dextrose 50% Injectable 12.5 Gram(s) IV Push once  enoxaparin Injectable 30 milliGRAM(s) SubCutaneous every 24 hours  glucagon  Injectable 1 milliGRAM(s) IntraMuscular once  insulin lispro (ADMELOG) corrective regimen sliding scale   SubCutaneous Before meals and at bedtime  ketotifen 0.025% Ophthalmic Solution 1 Drop(s) Both EYES two times a day  meropenem  IVPB 1000 milliGRAM(s) IV Intermittent every 8 hours  metoprolol succinate ER 50 milliGRAM(s) Oral daily  mirtazapine 7.5 milliGRAM(s) Oral at bedtime  nystatin    Suspension 941657 Unit(s) Oral four times a day  pantoprazole    Tablet 40 milliGRAM(s) Oral before breakfast  polyethylene glycol 3350 17 Gram(s) Oral daily  senna 2 Tablet(s) Oral at bedtime  simethicone 80 milliGRAM(s) Chew daily  sodium chloride 1 Gram(s) Oral three times a day  sodium chloride 3%  Inhalation 4 milliLiter(s) Inhalation every 12 hours    MEDICATIONS  (PRN):  bisacodyl 5 milliGRAM(s) Oral every 12 hours PRN Constipation  dextrose Oral Gel 15 Gram(s) Oral once PRN Blood Glucose LESS THAN 70 milliGRAM(s)/deciliter  fluticasone propionate 50 MICROgram(s)/spray Nasal Spray 1 Spray(s) Both Nostrils two times a day PRN nasal congestion      LABS:                        10.9   13.21 )-----------( 150      ( 20 May 2023 07:30 )             33.7     05-20    128<L>  |  85<L>  |  7   ----------------------------<  151<H>  4.3   |  36<H>  |  0.28<L>    Ca    9.5      20 May 2023 07:30  Phos  2.0     05-20  Mg     1.70     05-20    TPro  6.6  /  Alb  3.3  /  TBili  0.2  /  DBili  x   /  AST  35<H>  /  ALT  27  /  AlkPhos  152<H>  05-20          Venous Blood Gas:  05-20 @ 12:20  7.38/74/63/44/92.6  VBG Lactate: 0.8  Venous Blood Gas:  05-20 @ 07:30  7.30/90/66/44/92.4  VBG Lactate: 0.7  Venous Blood Gas:  05-19 @ 13:22  --/--/--/--/--  VBG Lactate: 0.9      MICROBIOLOGY:     RADIOLOGY:  [ ] Reviewed and interpreted by me    Point of Care Ultrasound Findings:    PFT:    EKG:

## 2023-05-20 NOTE — DISCHARGE NOTE PROVIDER - PROVIDER TOKENS
PROVIDER:[TOKEN:[[id number]],FOLLOWUP:[1 week],ESTABLISHEDPATIENT:[T]],PROVIDER:[TOKEN:[[id number]],FOLLOWUP:[1 week],ESTABLISHEDPATIENT:[T]]

## 2023-05-20 NOTE — PROGRESS NOTE ADULT - ATTENDING COMMENTS
Patient is a 67 yo F w/ PCD c/b bronchiectasis w/ multiple admissions for bronchiectasis exacerbation (Pseudomonas and ESBL klebsiella), chronic hypoxemic and hypercapnic respiratory failure (2-4 L NC/AVAPS), HTN, prior COVID 1/2022 who was advised by her pulmonologist Dr. Young to present to ED for AMS and lethargy.    #Acute on chronic hypoxemic and hypercapnic respiratory failure  #Bronchiectasis  #Primary Ciliary Dyskinesia    - pCO2 improved with AVAPS from 90 to 74 with improvement in pH  - trial NC vs HFNC if needed to maintain oxygenation - switch back to AVAPS at nighttime and for naps  - continue cipro and meropenem for bronchiectasis exacerbation, f/u cultures  - Airway clearance with bronchodilators, 3% saline, mucomyst, aerobika and chest PT  - start prednisone 20 mg daily  - F/u LE duplex    DNR/DNI    Pulmonary to follow.

## 2023-05-20 NOTE — DIETITIAN INITIAL EVALUATION ADULT - REASON FOR ADMISSION
"Subjective:       Patient ID: Fernando Ragland Altered mental status

## 2023-05-20 NOTE — DISCHARGE NOTE PROVIDER - DETAILS OF MALNUTRITION DIAGNOSIS/DIAGNOSES
This patient has been assessed with a concern for Malnutrition and was treated during this hospitalization for the following Nutrition diagnosis/diagnoses:     -  05/20/2023: Severe protein-calorie malnutrition   -  05/20/2023: Underweight (BMI < 19)

## 2023-05-20 NOTE — PROGRESS NOTE ADULT - ATTENDING COMMENTS
68F with PMH of primary ciliary dyskinesia, bronchiectasis, chronic hypoxic/hypercapnic respiratory failure on 4LNC/AVAPS at home, HTN, chronic constipation, recently hospitalized for resp failure who presents to hospital w/ AMS and SOB. Initial workup notable for respiratory acidosis/hypercarbia on gas. Pt also noted to be hyponatremic. CXR appeared stable w/ chronic changes when compared to before. No overt signs of infection based on current workup. Admit for acute on chronic hypoxic/hypercapnic respiratory failure 2’ PCD/bronchiectasis. MICU was consulted, but pt deemed stable for floor. Pulm on board for further input. ID c/s for abx recs. Remains hospitalized pending further improvement.    Pt seen at bedside.  ID [id number] utilized. RN/resident also at bedside. pt was noted to be in distress w/ inc WOB, use of accessory muscles. Lungs w/ coarse BS bilaterally. VBG concerning for worsening hypercarbia. Pt also noted that she felt something was stuck in her throat. She was suctions with lots of mucus coming back. She was placed on AVAPS. Re-evaluated shortly afterwards, seems to be doing better. Remains tenuous. Prognosis grave. GOC discussion with family in persona nd over phone as noted above.       #Acute on chronic hypercapnic and hypoxemic respiratory failure 2' bronchiectasis, primary ciliary dyskinesia  #Metabolic encephalopathy 2’ hypercarbia/above  #Respiratory acidosis  #Hyponatremia, poss hypovolemic v. SIADH  #HTN  #Hyperkalemia  #Thrombocytopenia    -Prognosis is poor. DNR/DNI status updated in chart. MOLST completed.   -Cont broad spectrum abx. ID recs appreciated.  -Cont pulmonary toilet, inhaler regimen as ordered. Appreciate pulm recs.  -NPO for now given respiratory status. May need SLP pending course. However, if ultimately transitioned to comfort -- can allow for comfort feeds if family/pt wants it.          Rest of plan as outlined above.

## 2023-05-21 LAB
ALBUMIN SERPL ELPH-MCNC: 3.2 G/DL — LOW (ref 3.3–5)
ALP SERPL-CCNC: 144 U/L — HIGH (ref 40–120)
ALT FLD-CCNC: 20 U/L — SIGNIFICANT CHANGE UP (ref 4–33)
ANION GAP SERPL CALC-SCNC: 10 MMOL/L — SIGNIFICANT CHANGE UP (ref 7–14)
ANION GAP SERPL CALC-SCNC: 5 MMOL/L — LOW (ref 7–14)
ANION GAP SERPL CALC-SCNC: 8 MMOL/L — SIGNIFICANT CHANGE UP (ref 7–14)
AST SERPL-CCNC: 26 U/L — SIGNIFICANT CHANGE UP (ref 4–32)
BILIRUB SERPL-MCNC: 0.2 MG/DL — SIGNIFICANT CHANGE UP (ref 0.2–1.2)
BLOOD GAS VENOUS COMPREHENSIVE RESULT: SIGNIFICANT CHANGE UP
BUN SERPL-MCNC: 11 MG/DL — SIGNIFICANT CHANGE UP (ref 7–23)
BUN SERPL-MCNC: 9 MG/DL — SIGNIFICANT CHANGE UP (ref 7–23)
BUN SERPL-MCNC: 9 MG/DL — SIGNIFICANT CHANGE UP (ref 7–23)
CALCIUM SERPL-MCNC: 9 MG/DL — SIGNIFICANT CHANGE UP (ref 8.4–10.5)
CALCIUM SERPL-MCNC: 9.5 MG/DL — SIGNIFICANT CHANGE UP (ref 8.4–10.5)
CALCIUM SERPL-MCNC: 9.7 MG/DL — SIGNIFICANT CHANGE UP (ref 8.4–10.5)
CHLORIDE SERPL-SCNC: 86 MMOL/L — LOW (ref 98–107)
CHLORIDE SERPL-SCNC: 87 MMOL/L — LOW (ref 98–107)
CHLORIDE SERPL-SCNC: 87 MMOL/L — LOW (ref 98–107)
CO2 SERPL-SCNC: 31 MMOL/L — SIGNIFICANT CHANGE UP (ref 22–31)
CO2 SERPL-SCNC: 36 MMOL/L — HIGH (ref 22–31)
CO2 SERPL-SCNC: 39 MMOL/L — HIGH (ref 22–31)
CREAT SERPL-MCNC: 0.35 MG/DL — LOW (ref 0.5–1.3)
CREAT SERPL-MCNC: 0.36 MG/DL — LOW (ref 0.5–1.3)
CREAT SERPL-MCNC: 0.36 MG/DL — LOW (ref 0.5–1.3)
EGFR: 112 ML/MIN/1.73M2 — SIGNIFICANT CHANGE UP
EGFR: 112 ML/MIN/1.73M2 — SIGNIFICANT CHANGE UP
EGFR: 113 ML/MIN/1.73M2 — SIGNIFICANT CHANGE UP
GAS PNL BLDV: SIGNIFICANT CHANGE UP
GLUCOSE BLDC GLUCOMTR-MCNC: 188 MG/DL — HIGH (ref 70–99)
GLUCOSE BLDC GLUCOMTR-MCNC: 259 MG/DL — HIGH (ref 70–99)
GLUCOSE BLDC GLUCOMTR-MCNC: 269 MG/DL — HIGH (ref 70–99)
GLUCOSE BLDC GLUCOMTR-MCNC: 297 MG/DL — HIGH (ref 70–99)
GLUCOSE SERPL-MCNC: 112 MG/DL — HIGH (ref 70–99)
GLUCOSE SERPL-MCNC: 120 MG/DL — HIGH (ref 70–99)
GLUCOSE SERPL-MCNC: 388 MG/DL — HIGH (ref 70–99)
HCT VFR BLD CALC: 34.2 % — LOW (ref 34.5–45)
HCT VFR BLD CALC: 35 % — SIGNIFICANT CHANGE UP (ref 34.5–45)
HGB BLD-MCNC: 10.9 G/DL — LOW (ref 11.5–15.5)
HGB BLD-MCNC: 11 G/DL — LOW (ref 11.5–15.5)
MAGNESIUM SERPL-MCNC: 1.7 MG/DL — SIGNIFICANT CHANGE UP (ref 1.6–2.6)
MAGNESIUM SERPL-MCNC: 1.8 MG/DL — SIGNIFICANT CHANGE UP (ref 1.6–2.6)
MAGNESIUM SERPL-MCNC: 1.8 MG/DL — SIGNIFICANT CHANGE UP (ref 1.6–2.6)
MCHC RBC-ENTMCNC: 26.1 PG — LOW (ref 27–34)
MCHC RBC-ENTMCNC: 26.8 PG — LOW (ref 27–34)
MCHC RBC-ENTMCNC: 31.4 GM/DL — LOW (ref 32–36)
MCHC RBC-ENTMCNC: 31.9 GM/DL — LOW (ref 32–36)
MCV RBC AUTO: 83.1 FL — SIGNIFICANT CHANGE UP (ref 80–100)
MCV RBC AUTO: 84 FL — SIGNIFICANT CHANGE UP (ref 80–100)
NRBC # BLD: 0 /100 WBCS — SIGNIFICANT CHANGE UP (ref 0–0)
NRBC # BLD: 0 /100 WBCS — SIGNIFICANT CHANGE UP (ref 0–0)
NRBC # FLD: 0 K/UL — SIGNIFICANT CHANGE UP (ref 0–0)
NRBC # FLD: 0 K/UL — SIGNIFICANT CHANGE UP (ref 0–0)
PHOSPHATE SERPL-MCNC: 2.2 MG/DL — LOW (ref 2.5–4.5)
PHOSPHATE SERPL-MCNC: 2.7 MG/DL — SIGNIFICANT CHANGE UP (ref 2.5–4.5)
PHOSPHATE SERPL-MCNC: 2.7 MG/DL — SIGNIFICANT CHANGE UP (ref 2.5–4.5)
PLATELET # BLD AUTO: 142 K/UL — LOW (ref 150–400)
PLATELET # BLD AUTO: 168 K/UL — SIGNIFICANT CHANGE UP (ref 150–400)
POTASSIUM SERPL-MCNC: 4.3 MMOL/L — SIGNIFICANT CHANGE UP (ref 3.5–5.3)
POTASSIUM SERPL-MCNC: 4.7 MMOL/L — SIGNIFICANT CHANGE UP (ref 3.5–5.3)
POTASSIUM SERPL-MCNC: 5.1 MMOL/L — SIGNIFICANT CHANGE UP (ref 3.5–5.3)
POTASSIUM SERPL-SCNC: 4.3 MMOL/L — SIGNIFICANT CHANGE UP (ref 3.5–5.3)
POTASSIUM SERPL-SCNC: 4.7 MMOL/L — SIGNIFICANT CHANGE UP (ref 3.5–5.3)
POTASSIUM SERPL-SCNC: 5.1 MMOL/L — SIGNIFICANT CHANGE UP (ref 3.5–5.3)
PROT SERPL-MCNC: 6.4 G/DL — SIGNIFICANT CHANGE UP (ref 6–8.3)
RBC # BLD: 4.07 M/UL — SIGNIFICANT CHANGE UP (ref 3.8–5.2)
RBC # BLD: 4.21 M/UL — SIGNIFICANT CHANGE UP (ref 3.8–5.2)
RBC # FLD: 13.1 % — SIGNIFICANT CHANGE UP (ref 10.3–14.5)
RBC # FLD: 13.1 % — SIGNIFICANT CHANGE UP (ref 10.3–14.5)
SODIUM SERPL-SCNC: 127 MMOL/L — LOW (ref 135–145)
SODIUM SERPL-SCNC: 131 MMOL/L — LOW (ref 135–145)
SODIUM SERPL-SCNC: 131 MMOL/L — LOW (ref 135–145)
WBC # BLD: 11.58 K/UL — HIGH (ref 3.8–10.5)
WBC # BLD: 7.82 K/UL — SIGNIFICANT CHANGE UP (ref 3.8–10.5)
WBC # FLD AUTO: 11.58 K/UL — HIGH (ref 3.8–10.5)
WBC # FLD AUTO: 7.82 K/UL — SIGNIFICANT CHANGE UP (ref 3.8–10.5)

## 2023-05-21 PROCEDURE — 99232 SBSQ HOSP IP/OBS MODERATE 35: CPT | Mod: GC

## 2023-05-21 PROCEDURE — 99233 SBSQ HOSP IP/OBS HIGH 50: CPT | Mod: GC

## 2023-05-21 RX ORDER — MEROPENEM 1 G/30ML
1000 INJECTION INTRAVENOUS EVERY 8 HOURS
Refills: 0 | Status: DISCONTINUED | OUTPATIENT
Start: 2023-05-21 | End: 2023-05-21

## 2023-05-21 RX ORDER — IPRATROPIUM/ALBUTEROL SULFATE 18-103MCG
3 AEROSOL WITH ADAPTER (GRAM) INHALATION ONCE
Refills: 0 | Status: COMPLETED | OUTPATIENT
Start: 2023-05-21 | End: 2023-05-21

## 2023-05-21 RX ORDER — MEROPENEM 1 G/30ML
1000 INJECTION INTRAVENOUS EVERY 8 HOURS
Refills: 0 | Status: DISCONTINUED | OUTPATIENT
Start: 2023-05-21 | End: 2023-05-22

## 2023-05-21 RX ADMIN — Medication 750 MILLIGRAM(S): at 05:45

## 2023-05-21 RX ADMIN — Medication 500000 UNIT(S): at 12:21

## 2023-05-21 RX ADMIN — Medication 500000 UNIT(S): at 05:46

## 2023-05-21 RX ADMIN — Medication 3: at 22:59

## 2023-05-21 RX ADMIN — BUDESONIDE AND FORMOTEROL FUMARATE DIHYDRATE 2 PUFF(S): 160; 4.5 AEROSOL RESPIRATORY (INHALATION) at 21:49

## 2023-05-21 RX ADMIN — MIRTAZAPINE 7.5 MILLIGRAM(S): 45 TABLET, ORALLY DISINTEGRATING ORAL at 21:50

## 2023-05-21 RX ADMIN — SODIUM CHLORIDE 4 MILLILITER(S): 9 INJECTION INTRAMUSCULAR; INTRAVENOUS; SUBCUTANEOUS at 09:53

## 2023-05-21 RX ADMIN — SODIUM CHLORIDE 1 GRAM(S): 9 INJECTION INTRAMUSCULAR; INTRAVENOUS; SUBCUTANEOUS at 05:43

## 2023-05-21 RX ADMIN — SODIUM CHLORIDE 1 GRAM(S): 9 INJECTION INTRAMUSCULAR; INTRAVENOUS; SUBCUTANEOUS at 21:50

## 2023-05-21 RX ADMIN — Medication 3: at 17:48

## 2023-05-21 RX ADMIN — POLYETHYLENE GLYCOL 3350 17 GRAM(S): 17 POWDER, FOR SOLUTION ORAL at 12:21

## 2023-05-21 RX ADMIN — ENOXAPARIN SODIUM 30 MILLIGRAM(S): 100 INJECTION SUBCUTANEOUS at 05:46

## 2023-05-21 RX ADMIN — Medication 750 MILLIGRAM(S): at 17:47

## 2023-05-21 RX ADMIN — MEROPENEM 100 MILLIGRAM(S): 1 INJECTION INTRAVENOUS at 22:07

## 2023-05-21 RX ADMIN — Medication 3 MILLILITER(S): at 04:34

## 2023-05-21 RX ADMIN — Medication 50 MILLIGRAM(S): at 05:43

## 2023-05-21 RX ADMIN — PANTOPRAZOLE SODIUM 40 MILLIGRAM(S): 20 TABLET, DELAYED RELEASE ORAL at 05:45

## 2023-05-21 RX ADMIN — MEROPENEM 100 MILLIGRAM(S): 1 INJECTION INTRAVENOUS at 05:43

## 2023-05-21 RX ADMIN — Medication 20 MILLIGRAM(S): at 12:20

## 2023-05-21 RX ADMIN — INSULIN GLARGINE 2 UNIT(S): 100 INJECTION, SOLUTION SUBCUTANEOUS at 08:53

## 2023-05-21 RX ADMIN — SENNA PLUS 2 TABLET(S): 8.6 TABLET ORAL at 21:50

## 2023-05-21 RX ADMIN — Medication 500000 UNIT(S): at 00:37

## 2023-05-21 RX ADMIN — Medication 3 MILLILITER(S): at 15:46

## 2023-05-21 RX ADMIN — Medication 500000 UNIT(S): at 17:47

## 2023-05-21 RX ADMIN — SODIUM CHLORIDE 1 GRAM(S): 9 INJECTION INTRAMUSCULAR; INTRAVENOUS; SUBCUTANEOUS at 12:22

## 2023-05-21 RX ADMIN — Medication 3 MILLILITER(S): at 21:58

## 2023-05-21 RX ADMIN — SODIUM CHLORIDE 4 MILLILITER(S): 9 INJECTION INTRAMUSCULAR; INTRAVENOUS; SUBCUTANEOUS at 21:58

## 2023-05-21 RX ADMIN — Medication 3 MILLILITER(S): at 09:53

## 2023-05-21 RX ADMIN — AMLODIPINE BESYLATE 5 MILLIGRAM(S): 2.5 TABLET ORAL at 05:45

## 2023-05-21 RX ADMIN — KETOTIFEN FUMARATE 1 DROP(S): 0.34 SOLUTION OPHTHALMIC at 17:47

## 2023-05-21 RX ADMIN — SIMETHICONE 80 MILLIGRAM(S): 80 TABLET, CHEWABLE ORAL at 12:22

## 2023-05-21 RX ADMIN — Medication 3: at 12:23

## 2023-05-21 RX ADMIN — MEROPENEM 100 MILLIGRAM(S): 1 INJECTION INTRAVENOUS at 16:04

## 2023-05-21 RX ADMIN — KETOTIFEN FUMARATE 1 DROP(S): 0.34 SOLUTION OPHTHALMIC at 05:46

## 2023-05-21 NOTE — PROGRESS NOTE ADULT - ASSESSMENT
67 yo F PMH PCD c/b bronchiectasis w/ multiple admissions for bronchiectasis exacerbation, HTN, COVID infection 1/2022, Pseudomonas and ESBL klebsiella, chronic hypoxemic on 2-4LNC and hypercapneic respiratory failure on AVAPS referred from outpatient pulmonologist for worsening mental status and lethargy found to be severely hyponatremia. Pulmonary consulted for bronchiectasis management and hypercarbic respiratory failure    - recommend empiric abx, ID recommendations appreciated. Continue Meropenem and Ciprofloxacin  - Continue steroids  - F/u sputum culture  - VBGs reviewed, patient currently awake  - Would continue AVAPS qhs and prn  - c/w supplemental O2, goal 90-92%  - please maintain on q6 duonebs, q12 hypersal, mucomyst, and aerobika; chest PT and physical therapy  - TTE reviewed  - consider palliative care evaluation, patient is now DNR/DNI  - pulmonary will follow    Carson Ramsay, PGY-7  Pulmonary and Critical Care Medicine  [id number]

## 2023-05-21 NOTE — PROGRESS NOTE ADULT - ASSESSMENT
68F with hx of bronchiectasis iso primary ciliary diskinesia on home AVAPS and home o2 is here today for acute on chronic hypoxemic hypercapneic respiratory failure in the setting of suspected pna. 68F with hx of bronchiectasis iso primary ciliary diskinesia on home AVAPS and home o2 presents with acute on chronic hypoxemic hypercapneic respiratory failure in the setting of pseudomonas pna with cipro; undergoing extensive pulmonary toilet with minimal improvements; initiating prednisone 20 qd today

## 2023-05-21 NOTE — PROGRESS NOTE ADULT - ATTENDING COMMENTS
68F with PMH of primary ciliary dyskinesia, bronchiectasis, chronic hypoxic/hypercapnic respiratory failure on 4LNC/AVAPS at home, HTN, chronic constipation, recently hospitalized for resp failure who presents to hospital w/ AMS and SOB. Initial workup notable for respiratory acidosis/hypercarbia on gas. Pt also noted to be hyponatremic. CXR appeared stable w/ chronic changes when compared to before. No overt signs of infection based on current workup. Admit for acute on chronic hypoxic/hypercapnic respiratory failure 2’ PCD/bronchiectasis. MICU was consulted, but pt deemed stable for floor. Pulm on board for further input. ID c/s for abx recs. Remains hospitalized pending further improvement.      Pt seen at bedside. No sig overnight events. Improved on AVAPS. BReathing more comfortably today. On exam, sleeping, normal respiratory effort. Chest w/ coarse BS throughout. Labs noted - hypercarbia w/ PCO2 90s.      #Acute on chronic hypercapnic and hypoxemic respiratory failure 2' bronchiectasis, primary ciliary dyskinesia  #Metabolic encephalopathy 2’ hypercarbia/above  #Respiratory acidosis  #Hyponatremia, poss hypovolemic v. SIADH  #HTN  #Hyperkalemia  #Thrombocytopenia       -Cont broad spectrum abx.   -Cont pulmonary toilet, inhaler regimen as ordered. Added steroid Appreciate pulm recs.  -Na cont to improved.  -Met with fam at bedside and reviewed POC. On going GOC. DNR/DNI already.     Rest of plan as outlined above.

## 2023-05-21 NOTE — PROGRESS NOTE ADULT - PROBLEM SELECTOR PLAN 1
Patient on AVAPS at home  VBG showed improvement after NIPPV/AVAPS  MICU was consulted in the ED; determined no ICU needs at the time  Patient does not appear overloaded; no diuresis indicated FRANCES  5/20 AM pt in tripod position w/ increased WOB, VBG 7.27/86/75/42 lact 0.9 -> 7.30/90/66/44 lact 0.7; placed NIPPV via AVAPS, will f/u afternoon gas and pulm recs     Plan:   Duonebs q6h  AVAPS at night  Aggressive pulmonary Toilet iso Primary Ciliary Diskinesia    Appreciate Pulm Recs  - Aggressive pulmonary Toilet  - Infectious workup per ID    Appreciate ID Recs:  - Meropenem 1g IV q8h  - [id number] mg po q12h  - F.U Sputum Cultures Patient on AVAPS at home  VBG showed improvement after NIPPV/AVAPS  MICU was consulted in the ED; determined no ICU needs at the time  Patient does not appear overloaded; no diuresis indicated FRANCES  5/20 AM pt in tripod position w/ increased WOB, VBG 7.27/86/75/42 lact 0.9 -> 7.30/90/66/44 lact 0.7; placed NIPPV via AVAPS, will f/u afternoon gas and pulm recs     Plan:   Duonebs q6h  AVAPS PRN  Aggressive pulmonary Toilet iso Primary Ciliary Diskinesia  GOC Discussed with family; re-affirmed DNR/DNI    Appreciate Pulm Recs  - Aggressive pulmonary Toilet  - Infectious workup per ID    Appreciate ID Recs:  - Meropenem 1g IV q8h  - [id number] mg po q12h  - F.U Sputum Cultures Patient on AVAPS at home  VBG showed improvement after NIPPV/AVAPS  MICU was consulted in the ED; determined no ICU needs at the time  Patient does not appear overloaded; no diuresis indicated FRANCES  5/20 AM pt in tripod position w/ increased WOB, VBG 7.27/86/75/42 lact 0.9 -> 7.30/90/66/44 lact 0.7; placed NIPPV via AVAPS, will f/u afternoon gas and pulm recs     Plan:   Duonebs q6h  AVAPS PRN  Aggressive pulmonary Toilet iso Primary Ciliary Diskinesia  GOC Discussed with family; re-affirmed DNR/DNI    Appreciate Pulm Recs  - Aggressive pulmonary Toilet  - Infectious workup per ID    Appreciate ID Recs:  - Meropenem 1g IV q8h 5/19-5/21  - [id number] mg po q12h 5/19 -   - Sputum Culture 5/18: pseudomonas; pending sensitivities

## 2023-05-21 NOTE — PROGRESS NOTE ADULT - ATTENDING COMMENTS
Patient is a 69 yo F w/ PCD c/b bronchiectasis w/ multiple admissions for bronchiectasis exacerbation (Pseudomonas and ESBL klebsiella), chronic hypoxemic and hypercapnic respiratory failure (2-4 L NC/AVAPS), HTN, prior COVID 1/2022 who was advised by her pulmonologist Dr. Young to present to ED for AMS and lethargy.    #Acute on chronic hypoxemic and hypercapnic respiratory failure  #Bronchiectasis  #Primary Ciliary Dyskinesia    - Continue AVAPS at night and NC during the day, can use AVAPS during the day prn  - continue cipro and meropenem for bronchiectasis exacerbation, f/u cultures  - Airway clearance with bronchodilators, 3% saline, mucomyst, aerobika and chest PT  - prednisone 20 mg daily  - LLE femoral vein thickening? on venous duplex, however no overt clot visualized    DNR/DNI    Pulmonary to follow.

## 2023-05-21 NOTE — PROGRESS NOTE ADULT - PROBLEM SELECTOR PLAN 5
POCT on admission in 400s  Given 10U Insulin       Restart home regiment  4U Lantus  2/2/2 Admelog POCT on admission in 400s  Given 10U Insulin     Restart home regiment  4U Lantus  2/2/2 Admelog

## 2023-05-21 NOTE — PROGRESS NOTE ADULT - SUBJECTIVE AND OBJECTIVE BOX
CHIEF COMPLAINT: SOB    Interval Events: Used AVAPS overnight, currently on NC. Coughing up sputum.    REVIEW OF SYSTEMS:  Constitutional: No fevers or chills.   Eyes: No itching or discharge from the eyes  ENT: No ear pain. No ear discharge. No nasal congestion.   CV: No chest pain. No palpitations. No lightheadedness or dizziness.   Resp: +Cough, sputum.  GI: No nausea. No vomiting. No diarrhea.  MSK: No joint pain or pain in any extremities  Integumentary: No skin lesions. No pedal edema.  Neurological: No gross motor weakness. No sensory changes.  [x] All other systems negative  [ ] Unable to assess ROS because ________    OBJECTIVE:  ICU Vital Signs Last 24 Hrs  T(C): 36.6 (21 May 2023 04:00), Max: 37.2 (20 May 2023 15:51)  T(F): 97.9 (21 May 2023 04:00), Max: 99 (20 May 2023 15:51)  HR: 78 (21 May 2023 10:56) (78 - 99)  BP: 168/87 (21 May 2023 04:00) (156/69 - 168/87)  BP(mean): --  ABP: --  ABP(mean): --  RR: 19 (21 May 2023 04:00) (18 - 19)  SpO2: 96% (21 May 2023 10:56) (94% - 98%)    O2 Parameters below as of 21 May 2023 09:53  Patient On (Oxygen Delivery Method): BiPAP/CPAP          Mode: NIV (Noninvasive Ventilation), RR (machine): 18, TV (machine): 450, FiO2: 35, PEEP: 5, ITime: 0.9, P-High: 25, P-Low: 15, PIP: 24    05-20 @ 07:01  -  05-21 @ 07:00  --------------------------------------------------------  IN: 120 mL / OUT: 1075 mL / NET: -955 mL      CAPILLARY BLOOD GLUCOSE      POCT Blood Glucose.: 259 mg/dL (21 May 2023 12:07)      PHYSICAL EXAM:  General: Awake, alert, oriented X 3.   HEENT: Atraumatic, normocephalic.   Lymph Nodes: No palpable lymphadenopathy  Neck: Supple  Respiratory: Normal chest expansion. +Crackles, coarse breath sounds  Cardiovascular: S1 S2 normal. No murmurs, rubs or gallops.   Abdomen: Soft, non-tender, non-distended. No organomegaly.  Extremities: Warm to touch. Peripheral pulse palpable. No pedal edema.   Skin: No rashes or skin lesions  Neurological: Motor and sensory examination equal and normal in all four extremities.  Psychiatry: Appropriate mood and affect.    HOSPITAL MEDICATIONS:  MEDICATIONS  (STANDING):  acetylcysteine 10%  Inhalation 4 milliLiter(s) Inhalation daily  albuterol/ipratropium for Nebulization 3 milliLiter(s) Nebulizer every 6 hours  amLODIPine   Tablet 5 milliGRAM(s) Oral daily  budesonide 160 MICROgram(s)/formoterol 4.5 MICROgram(s) Inhaler 2 Puff(s) Inhalation two times a day  ciprofloxacin     Tablet 750 milliGRAM(s) Oral two times a day  dextrose 5%. 1000 milliLiter(s) (50 mL/Hr) IV Continuous <Continuous>  dextrose 5%. 1000 milliLiter(s) (100 mL/Hr) IV Continuous <Continuous>  dextrose 50% Injectable 12.5 Gram(s) IV Push once  dextrose 50% Injectable 25 Gram(s) IV Push once  dextrose 50% Injectable 25 Gram(s) IV Push once  enoxaparin Injectable 30 milliGRAM(s) SubCutaneous every 24 hours  glucagon  Injectable 1 milliGRAM(s) IntraMuscular once  insulin glargine Injectable (LANTUS) 2 Unit(s) SubCutaneous every morning  insulin lispro (ADMELOG) corrective regimen sliding scale   SubCutaneous Before meals and at bedtime  ketotifen 0.025% Ophthalmic Solution 1 Drop(s) Both EYES two times a day  meropenem  IVPB 1000 milliGRAM(s) IV Intermittent every 8 hours  metoprolol succinate ER 50 milliGRAM(s) Oral daily  mirtazapine 7.5 milliGRAM(s) Oral at bedtime  nystatin    Suspension 835886 Unit(s) Oral four times a day  pantoprazole    Tablet 40 milliGRAM(s) Oral before breakfast  polyethylene glycol 3350 17 Gram(s) Oral daily  predniSONE   Tablet 20 milliGRAM(s) Oral daily  senna 2 Tablet(s) Oral at bedtime  simethicone 80 milliGRAM(s) Chew daily  sodium chloride 1 Gram(s) Oral three times a day  sodium chloride 3%  Inhalation 4 milliLiter(s) Inhalation every 12 hours    MEDICATIONS  (PRN):  bisacodyl 5 milliGRAM(s) Oral every 12 hours PRN Constipation  dextrose Oral Gel 15 Gram(s) Oral once PRN Blood Glucose LESS THAN 70 milliGRAM(s)/deciliter  fluticasone propionate 50 MICROgram(s)/spray Nasal Spray 1 Spray(s) Both Nostrils two times a day PRN nasal congestion      LABS:                        10.9   11.58 )-----------( 168      ( 21 May 2023 06:13 )             34.2     05-21    131<L>  |  87<L>  |  9   ----------------------------<  112<H>  4.3   |  36<H>  |  0.35<L>    Ca    9.7      21 May 2023 06:13  Phos  2.2     05-21  Mg     1.70     05-21    TPro  6.4  /  Alb  3.2<L>  /  TBili  0.2  /  DBili  x   /  AST  26  /  ALT  20  /  AlkPhos  144<H>  05-21          Venous Blood Gas:  05-21 @ 06:49  7.32/88/98/45/98.6  VBG Lactate: 0.7  Venous Blood Gas:  05-20 @ 21:49  7.30/96/47/47/78.5  VBG Lactate: 0.8  Venous Blood Gas:  05-20 @ 12:20  7.38/74/63/44/92.6  VBG Lactate: 0.8  Venous Blood Gas:  05-20 @ 07:30  7.30/90/66/44/92.4  VBG Lactate: 0.7      MICROBIOLOGY:     RADIOLOGY:  [ ] Reviewed and interpreted by me    Point of Care Ultrasound Findings:    PFT:    EKG:

## 2023-05-21 NOTE — PROGRESS NOTE ADULT - SUBJECTIVE AND OBJECTIVE BOX
INCOMPLETE NOTE    Demarcus Erin | PGY1| Pager: [place] [phone]) -- [place] ([phone])  Interval Events:    REVIEW OF SYSTEMS:  CONSTITUTIONAL: No weakness, fevers or chills  EYES/ENT: No visual changes;  No vertigo or throat pain   NECK: No pain or stiffness  RESPIRATORY: No cough, wheezing, hemoptysis; No shortness of breath  CARDIOVASCULAR: No chest pain or palpitations  GASTROINTESTINAL: No abdominal or epigastric pain. No nausea, vomiting, or hematemesis; No diarrhea or constipation. No melena or hematochezia.  GENITOURINARY: No dysuria, frequency or hematuria  NEUROLOGICAL: No numbness or weakness  SKIN: No itching, burning, rashes, or lesions   All other review of systems is negative unless indicated above.    OBJECTIVE:  ICU Vital Signs Last 24 Hrs  T(C): 36.6 (21 May 2023 04:00), Max: 37.2 (20 May 2023 15:51)  T(F): 97.9 (21 May 2023 04:00), Max: 99 (20 May 2023 15:51)  HR: 96 (21 May 2023 04:34) (80 - 99)  BP: 168/87 (21 May 2023 04:00) (152/62 - 168/87)  BP(mean): --  ABP: --  ABP(mean): --  RR: 19 (21 May 2023 04:00) (18 - 20)  SpO2: 94% (21 May 2023 04:34) (94% - 100%)    O2 Parameters below as of 21 May 2023 04:34  Patient On (Oxygen Delivery Method): BiPAP/CPAP          Mode: standby    05-20 @ 07:01  -  05-21 @ 07:00  --------------------------------------------------------  IN: 120 mL / OUT: 1075 mL / NET: -955 mL      CAPILLARY BLOOD GLUCOSE      POCT Blood Glucose.: 158 mg/dL (20 May 2023 22:15)      PHYSICAL EXAM:  General: WN/WD NAD  Neurology: A&Ox3, nonfocal, GARCIA x 4  Eyes: PERRLA/ EOMI, Gross vision intact  ENT/Neck: Neck supple, trachea midline, No JVD, Gross hearing intact  Respiratory: CTA B/L, No wheezing, rales, rhonchi  CV: RRR, +S1/S2, -S3/S4, no murmurs, rubs or gallops  Abdominal: Soft, NT, ND +BS, No HSM  MSK: 5/5 strength UE/LE bilaterally  Extremities: No edema, 2+ peripheral pulses  Skin: No Rashes, Hematoma, Ecchymosis  Incisions:   Tubes:    HOSPITAL MEDICATIONS:  MEDICATIONS  (STANDING):  acetylcysteine 10%  Inhalation 4 milliLiter(s) Inhalation daily  albuterol/ipratropium for Nebulization 3 milliLiter(s) Nebulizer once  albuterol/ipratropium for Nebulization 3 milliLiter(s) Nebulizer every 6 hours  amLODIPine   Tablet 5 milliGRAM(s) Oral daily  budesonide 160 MICROgram(s)/formoterol 4.5 MICROgram(s) Inhaler 2 Puff(s) Inhalation two times a day  ciprofloxacin     Tablet 750 milliGRAM(s) Oral two times a day  dextrose 5%. 1000 milliLiter(s) (100 mL/Hr) IV Continuous <Continuous>  dextrose 5%. 1000 milliLiter(s) (50 mL/Hr) IV Continuous <Continuous>  dextrose 50% Injectable 12.5 Gram(s) IV Push once  dextrose 50% Injectable 25 Gram(s) IV Push once  dextrose 50% Injectable 25 Gram(s) IV Push once  enoxaparin Injectable 30 milliGRAM(s) SubCutaneous every 24 hours  glucagon  Injectable 1 milliGRAM(s) IntraMuscular once  insulin glargine Injectable (LANTUS) 2 Unit(s) SubCutaneous every morning  insulin lispro (ADMELOG) corrective regimen sliding scale   SubCutaneous Before meals and at bedtime  ketotifen 0.025% Ophthalmic Solution 1 Drop(s) Both EYES two times a day  meropenem  IVPB 1000 milliGRAM(s) IV Intermittent every 8 hours  metoprolol succinate ER 50 milliGRAM(s) Oral daily  mirtazapine 7.5 milliGRAM(s) Oral at bedtime  nystatin    Suspension 920478 Unit(s) Oral four times a day  pantoprazole    Tablet 40 milliGRAM(s) Oral before breakfast  polyethylene glycol 3350 17 Gram(s) Oral daily  senna 2 Tablet(s) Oral at bedtime  simethicone 80 milliGRAM(s) Chew daily  sodium chloride 1 Gram(s) Oral three times a day  sodium chloride 3%  Inhalation 4 milliLiter(s) Inhalation every 12 hours    MEDICATIONS  (PRN):  bisacodyl 5 milliGRAM(s) Oral every 12 hours PRN Constipation  dextrose Oral Gel 15 Gram(s) Oral once PRN Blood Glucose LESS THAN 70 milliGRAM(s)/deciliter  fluticasone propionate 50 MICROgram(s)/spray Nasal Spray 1 Spray(s) Both Nostrils two times a day PRN nasal congestion      LABS:                        10.9   11.58 )-----------( 168      ( 21 May 2023 06:13 )             34.2     Hgb Trend: 10.9<--, 11.0<--, 10.9<--, 10.8<--, 11.9<--  05-20    127<L>  |  86<L>  |  9   ----------------------------<  120<H>  5.1   |  31  |  0.36<L>    Ca    9.0      20 May 2023 23:59  Phos  2.7     05-20  Mg     1.80     05-20    TPro  6.6  /  Alb  3.3  /  TBili  0.2  /  DBili  x   /  AST  35<H>  /  ALT  27  /  AlkPhos  152<H>  05-20    Creatinine Trend: 0.36<--, 0.29<--, 0.28<--, 0.27<--, 0.26<--, 0.26<--        Venous Blood Gas:  05-21 @ 06:49  7.32/88/98/45/98.6  VBG Lactate: 0.7  Venous Blood Gas:  05-20 @ 21:49  7.30/96/47/47/78.5  VBG Lactate: 0.8  Venous Blood Gas:  05-20 @ 12:20  7.38/74/63/44/92.6  VBG Lactate: 0.8  Venous Blood Gas:  05-20 @ 07:30  7.30/90/66/44/92.4  VBG Lactate: 0.7  Venous Blood Gas:  05-19 @ 13:22  --/--/--/--/--  VBG Lactate: 0.9      MICROBIOLOGY:     Culture - Sputum (collected 18 May 2023 18:34)  Source: .Sputum Sputum  Gram Stain (19 May 2023 05:43):    Moderate polymorphonuclear leukocytes per low power field    Few Squamous epithelial cells per low power field    Moderate Gram Positive Rods seen per oil power field    Moderate Gram Negative Rods seen per oil power field    Moderate Gram Variable Cocci seen per oil power field    Rare Yeast like cells seen per oil power field  Preliminary Report (20 May 2023 23:01):    Moderate Pseudomonas aeruginosa    Normal Respiratory Denise present           Demarcus Khan | PGY1| Pager: [place] [phone]) -- [place] ([phone])  Interval Events: patient's sputum positive for pseudomonas; stopping meropenem, Required AVAPS during the day; vbg this AM with persistent Hypercapnia, starting Prednisone 20mg qd a/p pulm recs. Patient looks more lethargic, with less energy today, however does not appear to be in acute distress; briefly touched on comfort care with family, they still want to attempt further AVAPS. C/W pulmonary Toilet and AVAPS    OBJECTIVE:  ICU Vital Signs Last 24 Hrs  T(C): 36.6 (21 May 2023 04:00), Max: 37.2 (20 May 2023 15:51)  T(F): 97.9 (21 May 2023 04:00), Max: 99 (20 May 2023 15:51)  HR: 96 (21 May 2023 04:34) (80 - 99)  BP: 168/87 (21 May 2023 04:00) (152/62 - 168/87)  BP(mean): --  ABP: --  ABP(mean): --  RR: 19 (21 May 2023 04:00) (18 - 20)  SpO2: 94% (21 May 2023 04:34) (94% - 100%)    O2 Parameters below as of 21 May 2023 04:34  Patient On (Oxygen Delivery Method): BiPAP/CPAP          Mode: standby    05-20 @ 07:01  -  05-21 @ 07:00  --------------------------------------------------------  IN: 120 mL / OUT: 1075 mL / NET: -955 mL      CAPILLARY BLOOD GLUCOSE      POCT Blood Glucose.: 158 mg/dL (20 May 2023 22:15)      PHYSICAL EXAM:  General: NAD  Neurology: Patient very tired, not able to answer orientation questions  Respiratory: Very junky lung sounds, no obvious change from prior  CV: RRR, +S1/S2, -S3/S4, no murmurs, rubs or gallops  Abdominal: Soft, NT, ND +BS, No HSM  Extremities: No edema, 2+ peripheral pulses  Skin: No Rashes, Hematoma, Ecchymosis      HOSPITAL MEDICATIONS:  MEDICATIONS  (STANDING):  acetylcysteine 10%  Inhalation 4 milliLiter(s) Inhalation daily  albuterol/ipratropium for Nebulization 3 milliLiter(s) Nebulizer once  albuterol/ipratropium for Nebulization 3 milliLiter(s) Nebulizer every 6 hours  amLODIPine   Tablet 5 milliGRAM(s) Oral daily  budesonide 160 MICROgram(s)/formoterol 4.5 MICROgram(s) Inhaler 2 Puff(s) Inhalation two times a day  ciprofloxacin     Tablet 750 milliGRAM(s) Oral two times a day  dextrose 5%. 1000 milliLiter(s) (100 mL/Hr) IV Continuous <Continuous>  dextrose 5%. 1000 milliLiter(s) (50 mL/Hr) IV Continuous <Continuous>  dextrose 50% Injectable 12.5 Gram(s) IV Push once  dextrose 50% Injectable 25 Gram(s) IV Push once  dextrose 50% Injectable 25 Gram(s) IV Push once  enoxaparin Injectable 30 milliGRAM(s) SubCutaneous every 24 hours  glucagon  Injectable 1 milliGRAM(s) IntraMuscular once  insulin glargine Injectable (LANTUS) 2 Unit(s) SubCutaneous every morning  insulin lispro (ADMELOG) corrective regimen sliding scale   SubCutaneous Before meals and at bedtime  ketotifen 0.025% Ophthalmic Solution 1 Drop(s) Both EYES two times a day  meropenem  IVPB 1000 milliGRAM(s) IV Intermittent every 8 hours  metoprolol succinate ER 50 milliGRAM(s) Oral daily  mirtazapine 7.5 milliGRAM(s) Oral at bedtime  nystatin    Suspension 648742 Unit(s) Oral four times a day  pantoprazole    Tablet 40 milliGRAM(s) Oral before breakfast  polyethylene glycol 3350 17 Gram(s) Oral daily  senna 2 Tablet(s) Oral at bedtime  simethicone 80 milliGRAM(s) Chew daily  sodium chloride 1 Gram(s) Oral three times a day  sodium chloride 3%  Inhalation 4 milliLiter(s) Inhalation every 12 hours    MEDICATIONS  (PRN):  bisacodyl 5 milliGRAM(s) Oral every 12 hours PRN Constipation  dextrose Oral Gel 15 Gram(s) Oral once PRN Blood Glucose LESS THAN 70 milliGRAM(s)/deciliter  fluticasone propionate 50 MICROgram(s)/spray Nasal Spray 1 Spray(s) Both Nostrils two times a day PRN nasal congestion      LABS:                        10.9   11.58 )-----------( 168      ( 21 May 2023 06:13 )             34.2     Hgb Trend: 10.9<--, 11.0<--, 10.9<--, 10.8<--, 11.9<--  05-20    127<L>  |  86<L>  |  9   ----------------------------<  120<H>  5.1   |  31  |  0.36<L>    Ca    9.0      20 May 2023 23:59  Phos  2.7     05-20  Mg     1.80     05-20    TPro  6.6  /  Alb  3.3  /  TBili  0.2  /  DBili  x   /  AST  35<H>  /  ALT  27  /  AlkPhos  152<H>  05-20    Creatinine Trend: 0.36<--, 0.29<--, 0.28<--, 0.27<--, 0.26<--, 0.26<--        Venous Blood Gas:  05-21 @ 06:49  7.32/88/98/45/98.6  VBG Lactate: 0.7  Venous Blood Gas:  05-20 @ 21:49  7.30/96/47/47/78.5  VBG Lactate: 0.8  Venous Blood Gas:  05-20 @ 12:20  7.38/74/63/44/92.6  VBG Lactate: 0.8  Venous Blood Gas:  05-20 @ 07:30  7.30/90/66/44/92.4  VBG Lactate: 0.7  Venous Blood Gas:  05-19 @ 13:22  --/--/--/--/--  VBG Lactate: 0.9      MICROBIOLOGY:     Culture - Sputum (collected 18 May 2023 18:34)  Source: .Sputum Sputum  Gram Stain (19 May 2023 05:43):    Moderate polymorphonuclear leukocytes per low power field    Few Squamous epithelial cells per low power field    Moderate Gram Positive Rods seen per oil power field    Moderate Gram Negative Rods seen per oil power field    Moderate Gram Variable Cocci seen per oil power field    Rare Yeast like cells seen per oil power field  Preliminary Report (20 May 2023 23:01):    Moderate Pseudomonas aeruginosa    Normal Respiratory Denise present

## 2023-05-21 NOTE — PROGRESS NOTE ADULT - PROBLEM SELECTOR PLAN 3
suspect hypotonic hypovolemic hypoNa given improvement s/p IVF boluses    Plan:  BMP q6h (5/20 128 (from 123 24h prior) will avoid correction exceeding 6-8 meq/24h  c/w Salt Tabs 1g TID can wean if hypoNa improving w/ fluid suspect hypotonic hypovolemic hypoNa given improvement s/p IVF boluses    Plan:  BMP qdh (5/21 AM: 128-131)  c/w Salt Tabs 1g TID can wean if hypoNa improving w/ fluid

## 2023-05-22 DIAGNOSIS — G93.41 METABOLIC ENCEPHALOPATHY: ICD-10-CM

## 2023-05-22 DIAGNOSIS — E11.9 TYPE 2 DIABETES MELLITUS WITHOUT COMPLICATIONS: ICD-10-CM

## 2023-05-22 DIAGNOSIS — K59.00 CONSTIPATION, UNSPECIFIED: ICD-10-CM

## 2023-05-22 DIAGNOSIS — E87.1 HYPO-OSMOLALITY AND HYPONATREMIA: ICD-10-CM

## 2023-05-22 DIAGNOSIS — I10 ESSENTIAL (PRIMARY) HYPERTENSION: ICD-10-CM

## 2023-05-22 DIAGNOSIS — J96.02 ACUTE RESPIRATORY FAILURE WITH HYPERCAPNIA: ICD-10-CM

## 2023-05-22 DIAGNOSIS — E87.5 HYPERKALEMIA: ICD-10-CM

## 2023-05-22 DIAGNOSIS — Z29.9 ENCOUNTER FOR PROPHYLACTIC MEASURES, UNSPECIFIED: ICD-10-CM

## 2023-05-22 LAB
-  AMIKACIN: SIGNIFICANT CHANGE UP
-  AMPICILLIN: SIGNIFICANT CHANGE UP
-  AZTREONAM: SIGNIFICANT CHANGE UP
-  CEFEPIME: SIGNIFICANT CHANGE UP
-  CEFTAZIDIME: SIGNIFICANT CHANGE UP
-  CIPROFLOXACIN: SIGNIFICANT CHANGE UP
-  CIPROFLOXACIN: SIGNIFICANT CHANGE UP
-  GENTAMICIN: SIGNIFICANT CHANGE UP
-  IMIPENEM: SIGNIFICANT CHANGE UP
-  LEVOFLOXACIN: SIGNIFICANT CHANGE UP
-  LEVOFLOXACIN: SIGNIFICANT CHANGE UP
-  MEROPENEM: SIGNIFICANT CHANGE UP
-  NITROFURANTOIN: SIGNIFICANT CHANGE UP
-  PIPERACILLIN/TAZOBACTAM: SIGNIFICANT CHANGE UP
-  TETRACYCLINE: SIGNIFICANT CHANGE UP
-  TOBRAMYCIN: SIGNIFICANT CHANGE UP
-  VANCOMYCIN: SIGNIFICANT CHANGE UP
ALBUMIN SERPL ELPH-MCNC: 3.1 G/DL — LOW (ref 3.3–5)
ALP SERPL-CCNC: 132 U/L — HIGH (ref 40–120)
ALT FLD-CCNC: 19 U/L — SIGNIFICANT CHANGE UP (ref 4–33)
ANION GAP SERPL CALC-SCNC: 8 MMOL/L — SIGNIFICANT CHANGE UP (ref 7–14)
AST SERPL-CCNC: 25 U/L — SIGNIFICANT CHANGE UP (ref 4–32)
BASE EXCESS BLDV CALC-SCNC: 17.9 MMOL/L — HIGH (ref -2–3)
BILIRUB SERPL-MCNC: 0.2 MG/DL — SIGNIFICANT CHANGE UP (ref 0.2–1.2)
BLOOD GAS VENOUS COMPREHENSIVE RESULT: SIGNIFICANT CHANGE UP
BUN SERPL-MCNC: 12 MG/DL — SIGNIFICANT CHANGE UP (ref 7–23)
CALCIUM SERPL-MCNC: 9.8 MG/DL — SIGNIFICANT CHANGE UP (ref 8.4–10.5)
CHLORIDE BLDV-SCNC: 91 MMOL/L — LOW (ref 96–108)
CHLORIDE SERPL-SCNC: 91 MMOL/L — LOW (ref 98–107)
CO2 BLDV-SCNC: 52.5 MMOL/L — HIGH (ref 22–26)
CO2 SERPL-SCNC: 38 MMOL/L — HIGH (ref 22–31)
CREAT SERPL-MCNC: 0.35 MG/DL — LOW (ref 0.5–1.3)
CULTURE RESULTS: SIGNIFICANT CHANGE UP
CULTURE RESULTS: SIGNIFICANT CHANGE UP
EGFR: 113 ML/MIN/1.73M2 — SIGNIFICANT CHANGE UP
GAS PNL BLDV: 131 MMOL/L — LOW (ref 136–145)
GAS PNL BLDV: SIGNIFICANT CHANGE UP
GAS PNL BLDV: SIGNIFICANT CHANGE UP
GLUCOSE BLDC GLUCOMTR-MCNC: 244 MG/DL — HIGH (ref 70–99)
GLUCOSE BLDV-MCNC: 305 MG/DL — HIGH (ref 70–99)
GLUCOSE SERPL-MCNC: 143 MG/DL — HIGH (ref 70–99)
HCO3 BLDV-SCNC: 49 MMOL/L — CRITICAL HIGH (ref 22–29)
HCT VFR BLD CALC: 35.2 % — SIGNIFICANT CHANGE UP (ref 34.5–45)
HCT VFR BLDA CALC: 36 % — SIGNIFICANT CHANGE UP (ref 34.5–46.5)
HGB BLD CALC-MCNC: 11.9 G/DL — SIGNIFICANT CHANGE UP (ref 11.7–16.1)
HGB BLD-MCNC: 10.8 G/DL — LOW (ref 11.5–15.5)
LACTATE BLDV-MCNC: 0.8 MMOL/L — SIGNIFICANT CHANGE UP (ref 0.5–2)
MAGNESIUM SERPL-MCNC: 1.9 MG/DL — SIGNIFICANT CHANGE UP (ref 1.6–2.6)
MCHC RBC-ENTMCNC: 26.9 PG — LOW (ref 27–34)
MCHC RBC-ENTMCNC: 30.7 GM/DL — LOW (ref 32–36)
MCV RBC AUTO: 87.8 FL — SIGNIFICANT CHANGE UP (ref 80–100)
METHOD TYPE: SIGNIFICANT CHANGE UP
METHOD TYPE: SIGNIFICANT CHANGE UP
NRBC # BLD: 0 /100 WBCS — SIGNIFICANT CHANGE UP (ref 0–0)
NRBC # FLD: 0 K/UL — SIGNIFICANT CHANGE UP (ref 0–0)
ORGANISM # SPEC MICROSCOPIC CNT: SIGNIFICANT CHANGE UP
PCO2 BLDV: 105 MMHG — HIGH (ref 39–52)
PH BLDV: 7.28 — LOW (ref 7.32–7.43)
PHOSPHATE SERPL-MCNC: 2.2 MG/DL — LOW (ref 2.5–4.5)
PLATELET # BLD AUTO: 145 K/UL — LOW (ref 150–400)
PO2 BLDV: 122 MMHG — HIGH (ref 25–45)
POTASSIUM BLDV-SCNC: 4.8 MMOL/L — SIGNIFICANT CHANGE UP (ref 3.5–5.1)
POTASSIUM SERPL-MCNC: 4.8 MMOL/L — SIGNIFICANT CHANGE UP (ref 3.5–5.3)
POTASSIUM SERPL-SCNC: 4.8 MMOL/L — SIGNIFICANT CHANGE UP (ref 3.5–5.3)
PROT SERPL-MCNC: 6.1 G/DL — SIGNIFICANT CHANGE UP (ref 6–8.3)
RBC # BLD: 4.01 M/UL — SIGNIFICANT CHANGE UP (ref 3.8–5.2)
RBC # FLD: 13.2 % — SIGNIFICANT CHANGE UP (ref 10.3–14.5)
SAO2 % BLDV: 98.8 % — HIGH (ref 67–88)
SODIUM SERPL-SCNC: 137 MMOL/L — SIGNIFICANT CHANGE UP (ref 135–145)
SPECIMEN SOURCE: SIGNIFICANT CHANGE UP
SPECIMEN SOURCE: SIGNIFICANT CHANGE UP
WBC # BLD: 10.74 K/UL — HIGH (ref 3.8–10.5)
WBC # FLD AUTO: 10.74 K/UL — HIGH (ref 3.8–10.5)

## 2023-05-22 PROCEDURE — 99233 SBSQ HOSP IP/OBS HIGH 50: CPT | Mod: GC

## 2023-05-22 PROCEDURE — 99232 SBSQ HOSP IP/OBS MODERATE 35: CPT

## 2023-05-22 RX ORDER — PIPERACILLIN AND TAZOBACTAM 4; .5 G/20ML; G/20ML
3.38 INJECTION, POWDER, LYOPHILIZED, FOR SOLUTION INTRAVENOUS EVERY 8 HOURS
Refills: 0 | Status: COMPLETED | OUTPATIENT
Start: 2023-05-22 | End: 2023-05-25

## 2023-05-22 RX ORDER — INSULIN GLARGINE 100 [IU]/ML
4 INJECTION, SOLUTION SUBCUTANEOUS AT BEDTIME
Refills: 0 | Status: DISCONTINUED | OUTPATIENT
Start: 2023-05-22 | End: 2023-05-24

## 2023-05-22 RX ORDER — INSULIN GLARGINE 100 [IU]/ML
7 INJECTION, SOLUTION SUBCUTANEOUS EVERY MORNING
Refills: 0 | Status: DISCONTINUED | OUTPATIENT
Start: 2023-05-22 | End: 2023-05-22

## 2023-05-22 RX ORDER — POTASSIUM PHOSPHATE, MONOBASIC POTASSIUM PHOSPHATE, DIBASIC 236; 224 MG/ML; MG/ML
15 INJECTION, SOLUTION INTRAVENOUS ONCE
Refills: 0 | Status: COMPLETED | OUTPATIENT
Start: 2023-05-22 | End: 2023-05-22

## 2023-05-22 RX ORDER — INSULIN LISPRO 100/ML
2 VIAL (ML) SUBCUTANEOUS
Refills: 0 | Status: DISCONTINUED | OUTPATIENT
Start: 2023-05-22 | End: 2023-05-23

## 2023-05-22 RX ADMIN — SODIUM CHLORIDE 4 MILLILITER(S): 9 INJECTION INTRAMUSCULAR; INTRAVENOUS; SUBCUTANEOUS at 10:26

## 2023-05-22 RX ADMIN — KETOTIFEN FUMARATE 1 DROP(S): 0.34 SOLUTION OPHTHALMIC at 22:52

## 2023-05-22 RX ADMIN — Medication 750 MILLIGRAM(S): at 05:28

## 2023-05-22 RX ADMIN — KETOTIFEN FUMARATE 1 DROP(S): 0.34 SOLUTION OPHTHALMIC at 05:29

## 2023-05-22 RX ADMIN — Medication 4: at 14:20

## 2023-05-22 RX ADMIN — BUDESONIDE AND FORMOTEROL FUMARATE DIHYDRATE 2 PUFF(S): 160; 4.5 AEROSOL RESPIRATORY (INHALATION) at 08:46

## 2023-05-22 RX ADMIN — AMLODIPINE BESYLATE 5 MILLIGRAM(S): 2.5 TABLET ORAL at 05:30

## 2023-05-22 RX ADMIN — Medication 3 MILLILITER(S): at 20:46

## 2023-05-22 RX ADMIN — ENOXAPARIN SODIUM 30 MILLIGRAM(S): 100 INJECTION SUBCUTANEOUS at 05:28

## 2023-05-22 RX ADMIN — Medication 3 MILLILITER(S): at 10:26

## 2023-05-22 RX ADMIN — Medication 20 MILLIGRAM(S): at 05:30

## 2023-05-22 RX ADMIN — Medication 2: at 08:46

## 2023-05-22 RX ADMIN — INSULIN GLARGINE 4 UNIT(S): 100 INJECTION, SOLUTION SUBCUTANEOUS at 22:51

## 2023-05-22 RX ADMIN — MEROPENEM 100 MILLIGRAM(S): 1 INJECTION INTRAVENOUS at 14:45

## 2023-05-22 RX ADMIN — SODIUM CHLORIDE 1 GRAM(S): 9 INJECTION INTRAMUSCULAR; INTRAVENOUS; SUBCUTANEOUS at 05:29

## 2023-05-22 RX ADMIN — MEROPENEM 100 MILLIGRAM(S): 1 INJECTION INTRAVENOUS at 05:26

## 2023-05-22 RX ADMIN — INSULIN GLARGINE 2 UNIT(S): 100 INJECTION, SOLUTION SUBCUTANEOUS at 09:24

## 2023-05-22 RX ADMIN — Medication 3 MILLILITER(S): at 16:29

## 2023-05-22 RX ADMIN — SODIUM CHLORIDE 4 MILLILITER(S): 9 INJECTION INTRAMUSCULAR; INTRAVENOUS; SUBCUTANEOUS at 20:46

## 2023-05-22 RX ADMIN — PANTOPRAZOLE SODIUM 40 MILLIGRAM(S): 20 TABLET, DELAYED RELEASE ORAL at 05:30

## 2023-05-22 RX ADMIN — Medication 3 MILLILITER(S): at 03:34

## 2023-05-22 RX ADMIN — Medication 4 MILLILITER(S): at 10:30

## 2023-05-22 RX ADMIN — PIPERACILLIN AND TAZOBACTAM 25 GRAM(S): 4; .5 INJECTION, POWDER, LYOPHILIZED, FOR SOLUTION INTRAVENOUS at 21:44

## 2023-05-22 RX ADMIN — Medication 500000 UNIT(S): at 05:27

## 2023-05-22 RX ADMIN — POTASSIUM PHOSPHATE, MONOBASIC POTASSIUM PHOSPHATE, DIBASIC 62.5 MILLIMOLE(S): 236; 224 INJECTION, SOLUTION INTRAVENOUS at 08:45

## 2023-05-22 RX ADMIN — Medication 50 MILLIGRAM(S): at 05:29

## 2023-05-22 NOTE — PROGRESS NOTE ADULT - PROBLEM SELECTOR PLAN 4
C/W Home Meds  Amlodipine 5mg qd  Toprol xl 50 qd C/W Home Meds  Amlodipine 5mg qd  Toprol xl 50 qd.

## 2023-05-22 NOTE — PROGRESS NOTE ADULT - ASSESSMENT
69 yo F PMH PCD c/b bronchiectasis w/ multiple admissions for bronchiectasis exacerbation, HTN, COVID infection 1/2022, Pseudomonas and ESBL klebsiella, chronic hypoxemic on 2-4LNC and hypercapneic respiratory failure on AVAPS referred from outpatient pulmonologist for worsening mental status and lethargy found to be severely hyponatremia. Pulmonary consulted for bronchiectasis management and hypercarbic respiratory failure    - recommend empiric abx, ID recommendations appreciated. Continue Meropenem and Ciprofloxacin  - Continue steroids  - please maintain on q6 duonebs, q12 hypersal, mucomyst, and aerobika; chest PT and physical therapy  - TTE reviewed  - VBG with worsened pCO2, patient lethargic. Settings adjusted and will repeat VBG to assess for response  - would recommend palliative care evaluation if does not improved, patient is now DNR/DNI  - Long discussion at bedside with family, for now they want to continue medical management and attempt AVAPS, but the idea of transitioning to a comfort-focused approach was introduced if the blood gases do not improve  - pulmonary will follow    Carson Ramsay, PGY-7  Pulmonary and Critical Care Medicine  [id number]

## 2023-05-22 NOTE — PROGRESS NOTE ADULT - SUBJECTIVE AND OBJECTIVE BOX
Snow Blood, PGY1    GRACIE BHAKTA  68y  Female    Complaints:  Subjective:     No acute o/n events. Pt denies subj fevers/chills, HA, dizziness, chest pain, palpitations, n/v, abd pain, dysuria, diarrhea      FAMILY HISTORY:  Family history of diabetes mellitus  father    Family history of allergic rhinitis (Child, Child)  son, daughter    Family history of bronchitis  mother, father      68yVital Signs Last 24 Hrs  T(C): 37.1 (22 May 2023 05:00), Max: 37.2 (22 May 2023 01:00)  T(F): 98.7 (22 May 2023 05:00), Max: 98.9 (22 May 2023 01:00)  HR: 88 (22 May 2023 05:00) (76 - 89)  BP: 162/73 (22 May 2023 05:00) (144/80 - 164/86)  BP(mean): --  RR: 18 (22 May 2023 05:00) (18 - 20)  SpO2: 99% (22 May 2023 05:00) (96% - 99%)    Parameters below as of 22 May 2023 05:00  Patient On (Oxygen Delivery Method): room air          PHYSICAL EXAM:  GENERAL: NAD, well-groomed, well-developed  HEAD:  Atraumatic, Normocephalic  EYES: EOMI, PERRLA, conjunctiva and sclera clear  ENMT: No tonsillar erythema, exudates, or enlargement; Moist mucous membranes, Good dentition, No lesions  NECK: Supple, No JVD, Normal thyroid  NERVOUS SYSTEM:  Alert & Oriented X3, Good concentration; Motor Strength 5/5 B/L upper and lower extremities; DTRs 2+ intact and symmetric  CHEST/LUNG: Clear to percussion bilaterally; No rales, rhonchi, wheezing, or rubs  HEART: Regular rate and rhythm; No murmurs, rubs, or gallops  ABDOMEN: Soft, Nontender, Nondistended; Bowel sounds present  EXTREMITIES:  2+ Peripheral Pulses, No clubbing, cyanosis, or edema  LYMPH: No lymphadenopathy noted  SKIN: No rashes or lesions      Consultant(s) Notes Reviewed:  [x ] YES  [ ] NO  Care Discussed with Consultants/Other Providers [ x] YES  [ ] NO    LABS:                Female  RADIOLOGY & ADDITIONAL TESTS:    Imaging Personally Reviewed:  [ ] YES  [ ] NO    MedsMEDICATIONS  (STANDING):  acetylcysteine 10%  Inhalation 4 milliLiter(s) Inhalation daily  albuterol/ipratropium for Nebulization 3 milliLiter(s) Nebulizer every 6 hours  amLODIPine   Tablet 5 milliGRAM(s) Oral daily  budesonide 160 MICROgram(s)/formoterol 4.5 MICROgram(s) Inhaler 2 Puff(s) Inhalation two times a day  ciprofloxacin     Tablet 750 milliGRAM(s) Oral two times a day  dextrose 5%. 1000 milliLiter(s) (50 mL/Hr) IV Continuous <Continuous>  dextrose 5%. 1000 milliLiter(s) (100 mL/Hr) IV Continuous <Continuous>  dextrose 50% Injectable 12.5 Gram(s) IV Push once  dextrose 50% Injectable 25 Gram(s) IV Push once  dextrose 50% Injectable 25 Gram(s) IV Push once  enoxaparin Injectable 30 milliGRAM(s) SubCutaneous every 24 hours  glucagon  Injectable 1 milliGRAM(s) IntraMuscular once  insulin glargine Injectable (LANTUS) 2 Unit(s) SubCutaneous every morning  insulin lispro (ADMELOG) corrective regimen sliding scale   SubCutaneous Before meals and at bedtime  ketotifen 0.025% Ophthalmic Solution 1 Drop(s) Both EYES two times a day  meropenem  IVPB 1000 milliGRAM(s) IV Intermittent every 8 hours  metoprolol succinate ER 50 milliGRAM(s) Oral daily  mirtazapine 7.5 milliGRAM(s) Oral at bedtime  nystatin    Suspension 371817 Unit(s) Oral four times a day  pantoprazole    Tablet 40 milliGRAM(s) Oral before breakfast  polyethylene glycol 3350 17 Gram(s) Oral daily  potassium phosphate IVPB 15 milliMole(s) IV Intermittent once  predniSONE   Tablet 20 milliGRAM(s) Oral daily  senna 2 Tablet(s) Oral at bedtime  simethicone 80 milliGRAM(s) Chew daily  sodium chloride 1 Gram(s) Oral three times a day  sodium chloride 3%  Inhalation 4 milliLiter(s) Inhalation every 12 hours    MEDICATIONS  (PRN):  bisacodyl 5 milliGRAM(s) Oral every 12 hours PRN Constipation  dextrose Oral Gel 15 Gram(s) Oral once PRN Blood Glucose LESS THAN 70 milliGRAM(s)/deciliter  fluticasone propionate 50 MICROgram(s)/spray Nasal Spray 1 Spray(s) Both Nostrils two times a day PRN nasal congestion      HEALTH ISSUES - PROBLEM Dx:                 Snow [facility], PGY1    GRACIE BHAKTA  68y  Female    Complaints:  Subjective:     No acute o/n events. Pt reportedly used AVAPS from 10pm to 5am. Pt on NC in the AM during my examination. Pt able to state her name and follow basic commands but unable to participate in conversation. Unable to obtain ROS.       FAMILY HISTORY:  Family history of diabetes mellitus  father    Family history of allergic rhinitis (Child, Child)  son, daughter    Family history of bronchitis  mother, father      68yVital Signs Last 24 Hrs  T(C): 37.1 (22 May 2023 05:00), Max: 37.2 (22 May 2023 01:00)  T(F): 98.7 (22 May 2023 05:00), Max: 98.9 (22 May 2023 01:00)  HR: 88 (22 May 2023 05:00) (76 - 89)  BP: 162/73 (22 May 2023 05:00) (144/80 - 164/86)  BP(mean): --  RR: 18 (22 May 2023 05:00) (18 - 20)  SpO2: 99% (22 May 2023 05:00) (96% - 99%)    Parameters below as of 22 May 2023 05:00  Patient On (Oxygen Delivery Method): room air          PHYSICAL EXAM:  GENERAL: NAD, well-groomed, well-developed  HEAD:  Atraumatic, Normocephalic  EYES: EOMI, PERRLA, conjunctiva and sclera clear  ENMT: No tonsillar erythema, exudates, or enlargement; Moist mucous membranes, Good dentition, No lesions  NECK: Supple, No JVD, Normal thyroid  NERVOUS SYSTEM:  Alert & Oriented X3, Good concentration; Motor Strength 5/5 B/L upper and lower extremities; DTRs 2+ intact and symmetric  CHEST/LUNG: Clear to percussion bilaterally; No rales, rhonchi, wheezing, or rubs  HEART: Regular rate and rhythm; No murmurs, rubs, or gallops  ABDOMEN: Soft, Nontender, Nondistended; Bowel sounds present  EXTREMITIES:  2+ Peripheral Pulses, No clubbing, cyanosis, or edema  LYMPH: No lymphadenopathy noted  SKIN: No rashes or lesions      Consultant(s) Notes Reviewed:  [x ] YES  [ ] NO  Care Discussed with Consultants/Other Providers [ x] YES  [ ] NO    LABS:                Female  RADIOLOGY & ADDITIONAL TESTS:    Imaging Personally Reviewed:  [ ] YES  [ ] NO    MedsMEDICATIONS  (STANDING):  acetylcysteine 10%  Inhalation 4 milliLiter(s) Inhalation daily  albuterol/ipratropium for Nebulization 3 milliLiter(s) Nebulizer every 6 hours  amLODIPine   Tablet 5 milliGRAM(s) Oral daily  budesonide 160 MICROgram(s)/formoterol 4.5 MICROgram(s) Inhaler 2 Puff(s) Inhalation two times a day  ciprofloxacin     Tablet 750 milliGRAM(s) Oral two times a day  dextrose 5%. 1000 milliLiter(s) (50 mL/Hr) IV Continuous <Continuous>  dextrose 5%. 1000 milliLiter(s) (100 mL/Hr) IV Continuous <Continuous>  dextrose 50% Injectable 12.5 Gram(s) IV Push once  dextrose 50% Injectable 25 Gram(s) IV Push once  dextrose 50% Injectable 25 Gram(s) IV Push once  enoxaparin Injectable 30 milliGRAM(s) SubCutaneous every 24 hours  glucagon  Injectable 1 milliGRAM(s) IntraMuscular once  insulin glargine Injectable (LANTUS) 2 Unit(s) SubCutaneous every morning  insulin lispro (ADMELOG) corrective regimen sliding scale   SubCutaneous Before meals and at bedtime  ketotifen 0.025% Ophthalmic Solution 1 Drop(s) Both EYES two times a day  meropenem  IVPB 1000 milliGRAM(s) IV Intermittent every 8 hours  metoprolol succinate ER 50 milliGRAM(s) Oral daily  mirtazapine 7.5 milliGRAM(s) Oral at bedtime  nystatin    Suspension 534329 Unit(s) Oral four times a day  pantoprazole    Tablet 40 milliGRAM(s) Oral before breakfast  polyethylene glycol 3350 17 Gram(s) Oral daily  potassium phosphate IVPB 15 milliMole(s) IV Intermittent once  predniSONE   Tablet 20 milliGRAM(s) Oral daily  senna 2 Tablet(s) Oral at bedtime  simethicone 80 milliGRAM(s) Chew daily  sodium chloride 1 Gram(s) Oral three times a day  sodium chloride 3%  Inhalation 4 milliLiter(s) Inhalation every 12 hours    MEDICATIONS  (PRN):  bisacodyl 5 milliGRAM(s) Oral every 12 hours PRN Constipation  dextrose Oral Gel 15 Gram(s) Oral once PRN Blood Glucose LESS THAN 70 milliGRAM(s)/deciliter  fluticasone propionate 50 MICROgram(s)/spray Nasal Spray 1 Spray(s) Both Nostrils two times a day PRN nasal congestion      HEALTH ISSUES - PROBLEM Dx:                 [facility], PGY1    GRACIE BHAKTA  68y  Female    Complaints:  Subjective:     No acute o/n events. Pt reportedly used AVAPS from 10pm to 5am. Pt on NC in the AM during my examination. Pt able to state her name and follow basic commands but unable to participate in conversation. Unable to obtain ROS.       FAMILY HISTORY:  Family history of diabetes mellitus  father    Family history of allergic rhinitis (Child, Child)  son, daughter    Family history of bronchitis  mother, father      68yVital Signs Last 24 Hrs  T(C): 37.1 (22 May 2023 05:00), Max: 37.2 (22 May 2023 01:00)  T(F): 98.7 (22 May 2023 05:00), Max: 98.9 (22 May 2023 01:00)  HR: 88 (22 May 2023 05:00) (76 - 89)  BP: 162/73 (22 May 2023 05:00) (144/80 - 164/86)  BP(mean): --  RR: 18 (22 May 2023 05:00) (18 - 20)  SpO2: 99% (22 May 2023 05:00) (96% - 99%)    Parameters below as of 22 May 2023 05:00  Patient On (Oxygen Delivery Method): room air          PHYSICAL EXAM:  GENERAL: Increased work of breathing. Cachectic appearance.   HEAD:  Atraumatic, Normocephalic  NECK: Supple, No JVD,  NERVOUS SYSTEM:  Alert & Oriented X1, Pt follows basic commands, unable to participate in conversation  CHEST/LUNG: +wheezing/crackles w/ mechanical breath sounds. Exam limited to anterior fields   HEART: Regular rate and rhythm; No murmurs  ABDOMEN: Soft, Nontender, Nondistended; Bowel sounds present  EXTREMITIES:  2+ Peripheral Pulses, No clubbing, cyanosis, or edema  LYMPH: No lymphadenopathy noted  SKIN: No rashes or lesions      Consultant(s) Notes Reviewed:  [x ] YES  [ ] NO  Care Discussed with Consultants/Other Providers [ x] YES  [ ] NO    LABS:                        10.8   10.74 )-----------( 145      ( 22 May 2023 05:20 )             35.2       05-22    137  |  91<L>  |  12  ----------------------------<  143<H>  4.8   |  38<H>  |  0.35<L>    Ca    9.8      22 May 2023 05:20  Phos  2.2     05-22  Mg     1.90     05-22    TPro  6.1  /  Alb  3.1<L>  /  TBili  0.2  /  DBili  x   /  AST  25  /  ALT  19  /  AlkPhos  132<H>  05-22            CAPILLARY BLOOD GLUCOSE      POCT Blood Glucose.: 310 mg/dL (22 May 2023 12:45)      Female  RADIOLOGY & ADDITIONAL TESTS:    Imaging Personally Reviewed:  [ ] YES  [ ] NO    MedsMEDICATIONS  (STANDING):  acetylcysteine 10%  Inhalation 4 milliLiter(s) Inhalation daily  albuterol/ipratropium for Nebulization 3 milliLiter(s) Nebulizer every 6 hours  amLODIPine   Tablet 5 milliGRAM(s) Oral daily  budesonide 160 MICROgram(s)/formoterol 4.5 MICROgram(s) Inhaler 2 Puff(s) Inhalation two times a day  ciprofloxacin     Tablet 750 milliGRAM(s) Oral two times a day  dextrose 5%. 1000 milliLiter(s) (50 mL/Hr) IV Continuous <Continuous>  dextrose 5%. 1000 milliLiter(s) (100 mL/Hr) IV Continuous <Continuous>  dextrose 50% Injectable 12.5 Gram(s) IV Push once  dextrose 50% Injectable 25 Gram(s) IV Push once  dextrose 50% Injectable 25 Gram(s) IV Push once  enoxaparin Injectable 30 milliGRAM(s) SubCutaneous every 24 hours  glucagon  Injectable 1 milliGRAM(s) IntraMuscular once  insulin glargine Injectable (LANTUS) 2 Unit(s) SubCutaneous every morning  insulin lispro (ADMELOG) corrective regimen sliding scale   SubCutaneous Before meals and at bedtime  ketotifen 0.025% Ophthalmic Solution 1 Drop(s) Both EYES two times a day  meropenem  IVPB 1000 milliGRAM(s) IV Intermittent every 8 hours  metoprolol succinate ER 50 milliGRAM(s) Oral daily  mirtazapine 7.5 milliGRAM(s) Oral at bedtime  nystatin    Suspension 998385 Unit(s) Oral four times a day  pantoprazole    Tablet 40 milliGRAM(s) Oral before breakfast  polyethylene glycol 3350 17 Gram(s) Oral daily  potassium phosphate IVPB 15 milliMole(s) IV Intermittent once  predniSONE   Tablet 20 milliGRAM(s) Oral daily  senna 2 Tablet(s) Oral at bedtime  simethicone 80 milliGRAM(s) Chew daily  sodium chloride 1 Gram(s) Oral three times a day  sodium chloride 3%  Inhalation 4 milliLiter(s) Inhalation every 12 hours    MEDICATIONS  (PRN):  bisacodyl 5 milliGRAM(s) Oral every 12 hours PRN Constipation  dextrose Oral Gel 15 Gram(s) Oral once PRN Blood Glucose LESS THAN 70 milliGRAM(s)/deciliter  fluticasone propionate 50 MICROgram(s)/spray Nasal Spray 1 Spray(s) Both Nostrils two times a day PRN nasal congestion      HEALTH ISSUES - PROBLEM Dx:

## 2023-05-22 NOTE — PROGRESS NOTE ADULT - SUBJECTIVE AND OBJECTIVE BOX
Follow Up:      Inverval History/ROS:Patient is a 68y old  Female who presents with a chief complaint of Acute Hypercapneic Hypoxemic Respiratory failure (22 May 2023 07:30)    On BIPAP  More letharigc per family  No fever      Allergies    No Known Allergies    Intolerances        ANTIMICROBIALS:  ciprofloxacin     Tablet 750 two times a day  meropenem  IVPB 1000 every 8 hours  nystatin    Suspension [id number] four times a day      OTHER MEDS:  acetylcysteine 10%  Inhalation 4 milliLiter(s) Inhalation daily  albuterol/ipratropium for Nebulization 3 milliLiter(s) Nebulizer every 6 hours  amLODIPine   Tablet 5 milliGRAM(s) Oral daily  bisacodyl 5 milliGRAM(s) Oral every 12 hours PRN  budesonide 160 MICROgram(s)/formoterol 4.5 MICROgram(s) Inhaler 2 Puff(s) Inhalation two times a day  dextrose 5%. 1000 milliLiter(s) IV Continuous <Continuous>  dextrose 5%. 1000 milliLiter(s) IV Continuous <Continuous>  dextrose 50% Injectable 12.5 Gram(s) IV Push once  dextrose 50% Injectable 25 Gram(s) IV Push once  dextrose 50% Injectable 25 Gram(s) IV Push once  dextrose Oral Gel 15 Gram(s) Oral once PRN  enoxaparin Injectable 30 milliGRAM(s) SubCutaneous every 24 hours  fluticasone propionate 50 MICROgram(s)/spray Nasal Spray 1 Spray(s) Both Nostrils two times a day PRN  glucagon  Injectable 1 milliGRAM(s) IntraMuscular once  insulin glargine Injectable (LANTUS) 4 Unit(s) SubCutaneous at bedtime  insulin lispro (ADMELOG) corrective regimen sliding scale   SubCutaneous Before meals and at bedtime  insulin lispro Injectable (ADMELOG) 2 Unit(s) SubCutaneous three times a day before meals  ketotifen 0.025% Ophthalmic Solution 1 Drop(s) Both EYES two times a day  metoprolol succinate ER 50 milliGRAM(s) Oral daily  mirtazapine 7.5 milliGRAM(s) Oral at bedtime  pantoprazole    Tablet 40 milliGRAM(s) Oral before breakfast  polyethylene glycol 3350 17 Gram(s) Oral daily  predniSONE   Tablet 20 milliGRAM(s) Oral daily  senna 2 Tablet(s) Oral at bedtime  simethicone 80 milliGRAM(s) Chew daily  sodium chloride 1 Gram(s) Oral three times a day  sodium chloride 3%  Inhalation 4 milliLiter(s) Inhalation every 12 hours      Vital Signs Last 24 Hrs  T(C): 36.3 (22 May 2023 11:51), Max: 37.2 (22 May 2023 01:00)  T(F): 97.4 (22 May 2023 11:51), Max: 98.9 (22 May 2023 01:00)  HR: 66 (22 May 2023 11:51) (66 - 89)  BP: 140/58 (22 May 2023 11:51) (140/58 - 162/75)  BP(mean): --  RR: 18 (22 May 2023 11:51) (18 - 18)  SpO2: 100% (22 May 2023 11:51) (96% - 100%)    Parameters below as of 22 May 2023 11:51  Patient On (Oxygen Delivery Method): nasal cannula        PHYSICAL EXAM:  General: [ ] non-toxic  HEAD/EYES: [ ] PERRL [x ] white sclera [ ] icterus  ENT:  [ ] normal [ x] supple [ ] thrush [ ] pharyngeal exudate  Cardiovascular:   [ ] murmur [x ] normal [ ] PPM/AICD  Respiratory:  [x ] clear to ausculation bilaterally  GI:  [ x] soft, non-tender, normal bowel sounds  :  [ ] francisco [ ] no CVA tenderness   Musculoskeletal:  [x ] no synovitis  Neurologic:  [ ] non-focal exam   Skin:  [x ] no rash  Lymph: [x no lymphadenopathy  Psychiatric:  [ ] appropriate affect [ ] alert & oriented  Lines:  [x ] no phlebitis [ ] central line                                10.8   10.74 )-----------( 145      ( 22 May 2023 05:20 )             35.2       05-22    137  |  91<L>  |  12  ----------------------------<  143<H>  4.8   |  38<H>  |  0.35<L>    Ca    9.8      22 May 2023 05:20  Phos  2.2     05-22  Mg     1.90     05-22    TPro  6.1  /  Alb  3.1<L>  /  TBili  0.2  /  DBili  x   /  AST  25  /  ALT  19  /  AlkPhos  132<H>  05-22          MICROBIOLOGY:Culture Results:   Moderate Pseudomonas aeruginosa  Normal Respiratory Denise present (05-18-23 @ 18:34)  Culture Results:   >100,000 CFU/ml Enterococcus faecium (05-18-23 @ 03:57)  Culture Results:   No growth to date. (05-18-23 @ 00:05)  Culture Results:   No growth to date. (05-17-23 @ 23:50)      RADIOLOGY:

## 2023-05-22 NOTE — PROGRESS NOTE ADULT - PROBLEM SELECTOR PLAN 3
suspect hypotonic hypovolemic hypoNa given improvement s/p IVF boluses    Plan:  BMP qdh (5/21 AM: 128-131)  c/w Salt Tabs 1g TID can wean if hypoNa improving w/ fluid suspect hypotonic hypovolemic hypoNa given improvement s/p IVF boluses    Plan:  BMP qdh (5/21 AM: 128-131)  c/w Salt Tabs 1g TID can wean if hypoNa improving w/ fluid.

## 2023-05-22 NOTE — PROGRESS NOTE ADULT - ATTENDING COMMENTS
Agree with above.     Overall poor prognosis but family still would like to pursue AVAPS treatment. Spent a significant amount of time discussing noninvasive treatment and expectations. AVAPS settings changed to ensure more TV given that she was only receiving 350cc of TV when I examined her. Would keep NPO for now. Continue steroids and antibiotics.

## 2023-05-22 NOTE — PROGRESS NOTE ADULT - ATTENDING COMMENTS
68F Primary Ciliary Dyskinesia with bronchiectasis – Home O2/AVAPS, HTN, p/w acute on chronic hypoxic hypercarbic respiratory failure from Pseudomonas PNA c/b acute encephalopathy, Hyponatremia, steroid induced DM2.  - unfortunately minimal clinical improvement despite abx and steroid with AVAPS support  - obtain ABG and coordinate with Pulm about any potential change to the AVAPS setting.  - however, appears to be poor prognosis due to advancing pulmonary dysfunction  - Pulm recommend Palliative Care consult - will notify Palliative care team for GOC and safe disposition if minimal improvement.  - Continue Meropenem IV, Cipro PO - appreciate ID recs  - Hyperglycemia - Added admelog for pre-meal coverage if patient able to tolerate PO intake - change lantus to qHS dosing.  - discussed with family by the bedside for 20 minutes on 5/22. Answered all the questions.

## 2023-05-22 NOTE — PROGRESS NOTE ADULT - PROBLEM SELECTOR PLAN 1
Patient on AVAPS at home  VBG showed improvement after NIPPV/AVAPS  MICU was consulted in the ED; determined no ICU needs at the time  Patient does not appear overloaded; no diuresis indicated FRANCES  5/20 AM pt in tripod position w/ increased WOB, VBG 7.27/86/75/42 lact 0.9 -> 7.30/90/66/44 lact 0.7; placed NIPPV via AVAPS, will f/u afternoon gas and pulm recs     Plan:   Duonebs q6h  AVAPS PRN  Aggressive pulmonary Toilet iso Primary Ciliary Diskinesia  GOC Discussed with family; re-affirmed DNR/DNI    Appreciate Pulm Recs  - Aggressive pulmonary Toilet  - Infectious workup per ID    Appreciate ID Recs:  - Meropenem 1g IV q8h 5/19-5/21  - [id number] mg po q12h 5/19 -   - Sputum Culture 5/18: pseudomonas; pending sensitivities Pt w/ AHRF w/ hypercapnia iso acute on chronic bronchiectasis due to primary biliary dyskinesia w/ SpCx positive for pseudomonas. Pt on AVAPS at home.   - On meropenum and cipro per ID. F/u Cx sensitivities  - Pulm following: c/w q6 duonebs, q12 hypersal, mucomyst, and aerobika; chest PT and physical therapy  - On Avaps qhs and evening. On NC during the day.   - Pt w/ worsening clinical status despite AVAPS.   - Will obtain repeat VBG  - Pt is DNR/DNI, will consider palliative consult Pt w/ AHRF w/ hypercapnia iso acute on chronic bronchiectasis due to primary biliary dyskinesia w/ SpCx positive for pseudomonas. Pt on AVAPS at home.   - On meropenum and cipro per ID. F/u Cx sensitivities  - Pulm following: c/w q6 duonebs, q12 hypersal, mucomyst, and aerobika; chest PT and physical therapy  - On Avaps qhs and evening. On NC during the day.   - Pt w/ worsening clinical status despite AVAPS.   - Will obtain repeat VBG  - Pt is DNR/DNI, will consider palliative consult.

## 2023-05-22 NOTE — PROGRESS NOTE ADULT - PROBLEM SELECTOR PLAN 5
POCT on admission in 400s  Given 10U Insulin     Restart home regiment  4U Lantus  2/2/2 Admelog Pt on prednisone 20mg w/ hyperglycemia on hospital labs.   - Will increase Lantus from 2U to 7U  - Add mealtime admelog 2U TID  - c/w ISS  - c/t monitor glucose Pt on prednisone 20mg w/ hyperglycemia on hospital labs.   - Will increase Lantus from 2U to 7U  - Add mealtime admelog 2U TID  - c/w ISS  - c/t monitor glucose.

## 2023-05-22 NOTE — PROGRESS NOTE ADULT - ASSESSMENT
68 year old with ciliary dyskinesia and bronchiectasis requiring bipap and supplemental oxygen at home, presents with change in mental status due to hypoxia/ hypercapnia.    Bronchiectasis exacerbation    H/o esbl and pseudomonas colonization    At this time, respiratory status is tenuous  Sputum Cx with pseudomonas    Prior cultures with ESBL, pseudomonas (various strains), Maddison martel    Continue meropenem/ cipro pending sensitivity

## 2023-05-22 NOTE — PROGRESS NOTE ADULT - ASSESSMENT
68F with hx of bronchiectasis iso primary ciliary diskinesia on home AVAPS and home o2 presents with acute on chronic hypoxemic hypercapneic respiratory failure in the setting of pseudomonas pna with cipro; undergoing extensive pulmonary toilet with minimal improvements; initiating prednisone 20 qd today

## 2023-05-22 NOTE — PROGRESS NOTE ADULT - SUBJECTIVE AND OBJECTIVE BOX
CHIEF COMPLAINT: Hypercapnia    Interval Events: More lethargic today, VBG obtained and reviewed, pCO2 elevated.    REVIEW OF SYSTEMS:  [x] Unable to assess ROS because lethargic    OBJECTIVE:  ICU Vital Signs Last 24 Hrs  T(C): 36.3 (22 May 2023 11:51), Max: 37.2 (22 May 2023 01:00)  T(F): 97.4 (22 May 2023 11:51), Max: 98.9 (22 May 2023 01:00)  HR: 66 (22 May 2023 11:51) (66 - 89)  BP: 140/58 (22 May 2023 11:51) (140/58 - 162/73)  BP(mean): --  ABP: --  ABP(mean): --  RR: 18 (22 May 2023 11:51) (18 - 18)  SpO2: 100% (22 May 2023 11:51) (96% - 100%)    O2 Parameters below as of 22 May 2023 11:51  Patient On (Oxygen Delivery Method): nasal cannula          Mode: standby    05-21 @ 07:01  -  05-22 @ 07:00  --------------------------------------------------------  IN: 0 mL / OUT: 1050 mL / NET: -1050 mL      CAPILLARY BLOOD GLUCOSE      POCT Blood Glucose.: 310 mg/dL (22 May 2023 12:45)      PHYSICAL EXAM:  General: Laying in bed, on AVAPS, appears tachypnic.  HEENT: Atraumatic, normocephalic.   Lymph Nodes: No palpable lymphadenopathy  Neck: Supple.  Respiratory: Tachypnic, coarse breath sounds.  Cardiovascular: S1 S2 normal. No murmurs, rubs or gallops.   Abdomen: Soft, non-tender, non-distended. No organomegaly.  Extremities: Warm to touch. Peripheral pulse palpable.  Skin: No rashes or skin lesions  Neurological: Lethargic, no focal deficits  Psychiatry: Unable to assess    HOSPITAL MEDICATIONS:  MEDICATIONS  (STANDING):  acetylcysteine 10%  Inhalation 4 milliLiter(s) Inhalation daily  albuterol/ipratropium for Nebulization 3 milliLiter(s) Nebulizer every 6 hours  amLODIPine   Tablet 5 milliGRAM(s) Oral daily  budesonide 160 MICROgram(s)/formoterol 4.5 MICROgram(s) Inhaler 2 Puff(s) Inhalation two times a day  ciprofloxacin     Tablet 750 milliGRAM(s) Oral two times a day  dextrose 5%. 1000 milliLiter(s) (100 mL/Hr) IV Continuous <Continuous>  dextrose 5%. 1000 milliLiter(s) (50 mL/Hr) IV Continuous <Continuous>  dextrose 50% Injectable 25 Gram(s) IV Push once  dextrose 50% Injectable 12.5 Gram(s) IV Push once  dextrose 50% Injectable 25 Gram(s) IV Push once  enoxaparin Injectable 30 milliGRAM(s) SubCutaneous every 24 hours  glucagon  Injectable 1 milliGRAM(s) IntraMuscular once  insulin glargine Injectable (LANTUS) 4 Unit(s) SubCutaneous at bedtime  insulin lispro (ADMELOG) corrective regimen sliding scale   SubCutaneous Before meals and at bedtime  insulin lispro Injectable (ADMELOG) 2 Unit(s) SubCutaneous three times a day before meals  ketotifen 0.025% Ophthalmic Solution 1 Drop(s) Both EYES two times a day  meropenem  IVPB 1000 milliGRAM(s) IV Intermittent every 8 hours  metoprolol succinate ER 50 milliGRAM(s) Oral daily  mirtazapine 7.5 milliGRAM(s) Oral at bedtime  nystatin    Suspension 145411 Unit(s) Oral four times a day  pantoprazole    Tablet 40 milliGRAM(s) Oral before breakfast  polyethylene glycol 3350 17 Gram(s) Oral daily  predniSONE   Tablet 20 milliGRAM(s) Oral daily  senna 2 Tablet(s) Oral at bedtime  simethicone 80 milliGRAM(s) Chew daily  sodium chloride 1 Gram(s) Oral three times a day  sodium chloride 3%  Inhalation 4 milliLiter(s) Inhalation every 12 hours    MEDICATIONS  (PRN):  bisacodyl 5 milliGRAM(s) Oral every 12 hours PRN Constipation  dextrose Oral Gel 15 Gram(s) Oral once PRN Blood Glucose LESS THAN 70 milliGRAM(s)/deciliter  fluticasone propionate 50 MICROgram(s)/spray Nasal Spray 1 Spray(s) Both Nostrils two times a day PRN nasal congestion      LABS:                        10.8   10.74 )-----------( 145      ( 22 May 2023 05:20 )             35.2     05-22    137  |  91<L>  |  12  ----------------------------<  143<H>  4.8   |  38<H>  |  0.35<L>    Ca    9.8      22 May 2023 05:20  Phos  2.2     05-22  Mg     1.90     05-22    TPro  6.1  /  Alb  3.1<L>  /  TBili  0.2  /  DBili  x   /  AST  25  /  ALT  19  /  AlkPhos  132<H>  05-22          Venous Blood Gas:  05-22 @ 13:58  7.28/105/122/49/98.8  VBG Lactate: 0.8  Venous Blood Gas:  05-21 @ 06:49  7.32/88/98/45/98.6  VBG Lactate: 0.7  Venous Blood Gas:  05-20 @ 21:49  7.30/96/47/47/78.5  VBG Lactate: 0.8      MICROBIOLOGY:     RADIOLOGY:  [ ] Reviewed and interpreted by me    Point of Care Ultrasound Findings:    PFT:    EKG:

## 2023-05-22 NOTE — PROGRESS NOTE ADULT - PROBLEM SELECTOR PLAN 2
Patient with acute metabolic encephalopathy likely iso hypercapnia    Plan:  As above Patient with acute metabolic encephalopathy likely iso hypercapnia and sepsis 2/2 psuedomonas pneumonia.   - Will c/w Meropenum and cipro  - F/u ID recs  - c/w AVAPS and f/u VBG Patient with acute metabolic encephalopathy likely iso hypercapnia and sepsis 2/2 psuedomonas pneumonia.   - Will c/w Meropenum and cipro  - F/u ID recs  - c/w AVAPS and f/u VBG.

## 2023-05-22 NOTE — PROGRESS NOTE ADULT - PROBLEM SELECTOR PLAN 8
Diet: CC/DASH no beef/pork  Psych/Sleep: None FRANCES  GI: Protonix  DVT ppx: Lovenox 40mg   Code Status: Pending further discussion  Dispo: Pending resolution of hyponatremia and hypercapnia Diet: CC/DASH no beef/pork  Psych/Sleep: None FRANCES  GI: Protonix  DVT ppx: Lovenox 40mg   Code Status: Pending further discussion  Dispo: Pending resolution of hyponatremia and hypercapnia.

## 2023-05-23 DIAGNOSIS — Z51.5 ENCOUNTER FOR PALLIATIVE CARE: ICD-10-CM

## 2023-05-23 DIAGNOSIS — Z71.89 OTHER SPECIFIED COUNSELING: ICD-10-CM

## 2023-05-23 DIAGNOSIS — R53.2 FUNCTIONAL QUADRIPLEGIA: ICD-10-CM

## 2023-05-23 LAB
ALBUMIN SERPL ELPH-MCNC: 3.2 G/DL — LOW (ref 3.3–5)
ALP SERPL-CCNC: 123 U/L — HIGH (ref 40–120)
ALT FLD-CCNC: 19 U/L — SIGNIFICANT CHANGE UP (ref 4–33)
ANION GAP SERPL CALC-SCNC: 1 MMOL/L — LOW (ref 7–14)
AST SERPL-CCNC: 19 U/L — SIGNIFICANT CHANGE UP (ref 4–32)
BILIRUB SERPL-MCNC: 0.2 MG/DL — SIGNIFICANT CHANGE UP (ref 0.2–1.2)
BUN SERPL-MCNC: 13 MG/DL — SIGNIFICANT CHANGE UP (ref 7–23)
CALCIUM SERPL-MCNC: 9.4 MG/DL — SIGNIFICANT CHANGE UP (ref 8.4–10.5)
CHLORIDE SERPL-SCNC: 91 MMOL/L — LOW (ref 98–107)
CO2 SERPL-SCNC: 41 MMOL/L — HIGH (ref 22–31)
CREAT SERPL-MCNC: 0.32 MG/DL — LOW (ref 0.5–1.3)
CULTURE RESULTS: SIGNIFICANT CHANGE UP
CULTURE RESULTS: SIGNIFICANT CHANGE UP
EGFR: 115 ML/MIN/1.73M2 — SIGNIFICANT CHANGE UP
GAS PNL BLDV: SIGNIFICANT CHANGE UP
GLUCOSE SERPL-MCNC: 54 MG/DL — CRITICAL LOW (ref 70–99)
HCT VFR BLD CALC: 34.2 % — LOW (ref 34.5–45)
HGB BLD-MCNC: 10.3 G/DL — LOW (ref 11.5–15.5)
MAGNESIUM SERPL-MCNC: 1.9 MG/DL — SIGNIFICANT CHANGE UP (ref 1.6–2.6)
MCHC RBC-ENTMCNC: 25.9 PG — LOW (ref 27–34)
MCHC RBC-ENTMCNC: 30.1 GM/DL — LOW (ref 32–36)
MCV RBC AUTO: 85.9 FL — SIGNIFICANT CHANGE UP (ref 80–100)
NRBC # BLD: 0 /100 WBCS — SIGNIFICANT CHANGE UP (ref 0–0)
NRBC # FLD: 0 K/UL — SIGNIFICANT CHANGE UP (ref 0–0)
PHOSPHATE SERPL-MCNC: 2.6 MG/DL — SIGNIFICANT CHANGE UP (ref 2.5–4.5)
PLATELET # BLD AUTO: 189 K/UL — SIGNIFICANT CHANGE UP (ref 150–400)
POTASSIUM SERPL-MCNC: 3.9 MMOL/L — SIGNIFICANT CHANGE UP (ref 3.5–5.3)
POTASSIUM SERPL-SCNC: 3.9 MMOL/L — SIGNIFICANT CHANGE UP (ref 3.5–5.3)
PROT SERPL-MCNC: 6.3 G/DL — SIGNIFICANT CHANGE UP (ref 6–8.3)
RBC # BLD: 3.98 M/UL — SIGNIFICANT CHANGE UP (ref 3.8–5.2)
RBC # FLD: 13.2 % — SIGNIFICANT CHANGE UP (ref 10.3–14.5)
SODIUM SERPL-SCNC: 133 MMOL/L — LOW (ref 135–145)
SPECIMEN SOURCE: SIGNIFICANT CHANGE UP
SPECIMEN SOURCE: SIGNIFICANT CHANGE UP
WBC # BLD: 10.86 K/UL — HIGH (ref 3.8–10.5)
WBC # FLD AUTO: 10.86 K/UL — HIGH (ref 3.8–10.5)

## 2023-05-23 PROCEDURE — 99498 ADVNCD CARE PLAN ADDL 30 MIN: CPT

## 2023-05-23 PROCEDURE — 99223 1ST HOSP IP/OBS HIGH 75: CPT

## 2023-05-23 PROCEDURE — 99233 SBSQ HOSP IP/OBS HIGH 50: CPT | Mod: GC

## 2023-05-23 PROCEDURE — 99232 SBSQ HOSP IP/OBS MODERATE 35: CPT

## 2023-05-23 PROCEDURE — 99497 ADVNCD CARE PLAN 30 MIN: CPT | Mod: 25

## 2023-05-23 RX ORDER — DEXTROSE 50 % IN WATER 50 %
25 SYRINGE (ML) INTRAVENOUS ONCE
Refills: 0 | Status: DISCONTINUED | OUTPATIENT
Start: 2023-05-23 | End: 2023-05-23

## 2023-05-23 RX ADMIN — PIPERACILLIN AND TAZOBACTAM 25 GRAM(S): 4; .5 INJECTION, POWDER, LYOPHILIZED, FOR SOLUTION INTRAVENOUS at 05:08

## 2023-05-23 RX ADMIN — SODIUM CHLORIDE 4 MILLILITER(S): 9 INJECTION INTRAMUSCULAR; INTRAVENOUS; SUBCUTANEOUS at 21:17

## 2023-05-23 RX ADMIN — Medication 4: at 17:56

## 2023-05-23 RX ADMIN — Medication 1: at 22:44

## 2023-05-23 RX ADMIN — Medication 3 MILLILITER(S): at 03:34

## 2023-05-23 RX ADMIN — SODIUM CHLORIDE 1 GRAM(S): 9 INJECTION INTRAMUSCULAR; INTRAVENOUS; SUBCUTANEOUS at 05:11

## 2023-05-23 RX ADMIN — PIPERACILLIN AND TAZOBACTAM 25 GRAM(S): 4; .5 INJECTION, POWDER, LYOPHILIZED, FOR SOLUTION INTRAVENOUS at 22:38

## 2023-05-23 RX ADMIN — Medication 50 MILLIGRAM(S): at 05:11

## 2023-05-23 RX ADMIN — BUDESONIDE AND FORMOTEROL FUMARATE DIHYDRATE 2 PUFF(S): 160; 4.5 AEROSOL RESPIRATORY (INHALATION) at 09:14

## 2023-05-23 RX ADMIN — SIMETHICONE 80 MILLIGRAM(S): 80 TABLET, CHEWABLE ORAL at 12:43

## 2023-05-23 RX ADMIN — PIPERACILLIN AND TAZOBACTAM 25 GRAM(S): 4; .5 INJECTION, POWDER, LYOPHILIZED, FOR SOLUTION INTRAVENOUS at 13:23

## 2023-05-23 RX ADMIN — Medication 500000 UNIT(S): at 05:11

## 2023-05-23 RX ADMIN — SODIUM CHLORIDE 1 GRAM(S): 9 INJECTION INTRAMUSCULAR; INTRAVENOUS; SUBCUTANEOUS at 12:45

## 2023-05-23 RX ADMIN — Medication 2: at 12:45

## 2023-05-23 RX ADMIN — KETOTIFEN FUMARATE 1 DROP(S): 0.34 SOLUTION OPHTHALMIC at 05:07

## 2023-05-23 RX ADMIN — ENOXAPARIN SODIUM 30 MILLIGRAM(S): 100 INJECTION SUBCUTANEOUS at 06:36

## 2023-05-23 RX ADMIN — Medication 500000 UNIT(S): at 17:57

## 2023-05-23 RX ADMIN — Medication 3 MILLILITER(S): at 21:17

## 2023-05-23 RX ADMIN — BUDESONIDE AND FORMOTEROL FUMARATE DIHYDRATE 2 PUFF(S): 160; 4.5 AEROSOL RESPIRATORY (INHALATION) at 22:38

## 2023-05-23 RX ADMIN — POLYETHYLENE GLYCOL 3350 17 GRAM(S): 17 POWDER, FOR SOLUTION ORAL at 12:43

## 2023-05-23 RX ADMIN — INSULIN GLARGINE 4 UNIT(S): 100 INJECTION, SOLUTION SUBCUTANEOUS at 22:44

## 2023-05-23 RX ADMIN — MIRTAZAPINE 7.5 MILLIGRAM(S): 45 TABLET, ORALLY DISINTEGRATING ORAL at 22:54

## 2023-05-23 RX ADMIN — PANTOPRAZOLE SODIUM 40 MILLIGRAM(S): 20 TABLET, DELAYED RELEASE ORAL at 05:11

## 2023-05-23 RX ADMIN — SODIUM CHLORIDE 4 MILLILITER(S): 9 INJECTION INTRAMUSCULAR; INTRAVENOUS; SUBCUTANEOUS at 10:03

## 2023-05-23 RX ADMIN — Medication 20 MILLIGRAM(S): at 05:11

## 2023-05-23 RX ADMIN — Medication 500000 UNIT(S): at 12:43

## 2023-05-23 RX ADMIN — SODIUM CHLORIDE 1 GRAM(S): 9 INJECTION INTRAMUSCULAR; INTRAVENOUS; SUBCUTANEOUS at 22:55

## 2023-05-23 RX ADMIN — Medication 3 MILLILITER(S): at 10:04

## 2023-05-23 RX ADMIN — KETOTIFEN FUMARATE 1 DROP(S): 0.34 SOLUTION OPHTHALMIC at 17:57

## 2023-05-23 RX ADMIN — AMLODIPINE BESYLATE 5 MILLIGRAM(S): 2.5 TABLET ORAL at 05:11

## 2023-05-23 RX ADMIN — Medication 3 MILLILITER(S): at 15:45

## 2023-05-23 NOTE — CONSULT NOTE ADULT - PROBLEM SELECTOR RECOMMENDATION 5
Palliative Care to continue to follow for symptom management and advanced care planning.    En Marquez MD  Palliative Medicine Fellow

## 2023-05-23 NOTE — CONSULT NOTE ADULT - SUBJECTIVE AND OBJECTIVE BOX
HPI:  68F with hx of primary ciliary dyskinesia, HTN, chronic constipation, bronchiectasis on 4L NC w/ frequent admissions for bronchiectasis (4/22) presents with 3 days of AMS and vomiting, found to be hypercapneic by outpatient PCP; recommending going to the ED. Patient admitted to medicine for bronchiectasis exacerbation. Palliative Care consulted for goals of care.       Jesusita   (18 May 2023 07:06)    PERTINENT PM/SXH:   DM (diabetes mellitus)    Bronchitis    ABPA (allergic bronchopulmonary aspergillosis)    Bronchiectasis    Asthma    Hypertension    Vitamin D deficiency    Microcytic anemia    GERD (gastroesophageal reflux disease)    Postnasal drip    Pseudomonas aeruginosa colonization    Allergic rhinitis    Recurrent pneumonia    Type 2 diabetes mellitus, with long-term current use of insulin    Anxiety and depression    Bronchiectasis with acute exacerbation      History of cholecystectomy    S/P dilatation of esophageal stricture      FAMILY HISTORY:  Family history of diabetes mellitus  father    Family history of allergic rhinitis (Child, Child)  son, daughter    Family history of bronchitis  mother, father      Family Hx substance abuse [ ]yes [ ]no  ITEMS NOT CHECKED ARE NOT PRESENT    SOCIAL HISTORY:   Significant other/partner[x ]  Children[x]  Quaker/Spirituality:  Substance hx:  [ ]   Tobacco hx:  [ ]   Alcohol hx: [ ]   Home Opioid hx:  [ ] I-Stop Reference No:  Living Situation: [x ]Home  [ ]Long term care  [ ]Rehab [ ]Other    ADVANCE DIRECTIVES:    DNR/MOLST  [x]  Living Will  [ ]   DECISION MAKER(s):  [ ] Health Care Proxy(s)  [x] Surrogate(s)  [ ] Guardian           Name(s): Lilia Odonnell  Phone Number(s): [phone]     BASELINE (I)ADL(s) (prior to admission):  [place]: [ ]Total  [ ] Moderate [x]Dependent    Allergies    No Known Allergies    Intolerances    MEDICATIONS  (STANDING):  acetylcysteine 10%  Inhalation 4 milliLiter(s) Inhalation daily  albuterol/ipratropium for Nebulization 3 milliLiter(s) Nebulizer every 6 hours  amLODIPine   Tablet 5 milliGRAM(s) Oral daily  budesonide 160 MICROgram(s)/formoterol 4.5 MICROgram(s) Inhaler 2 Puff(s) Inhalation two times a day  dextrose 5%. 1000 milliLiter(s) (100 mL/Hr) IV Continuous <Continuous>  dextrose 5%. 1000 milliLiter(s) (50 mL/Hr) IV Continuous <Continuous>  dextrose 50% Injectable 25 Gram(s) IV Push once  dextrose 50% Injectable 12.5 Gram(s) IV Push once  dextrose 50% Injectable 25 Gram(s) IV Push once  enoxaparin Injectable 30 milliGRAM(s) SubCutaneous every 24 hours  glucagon  Injectable 1 milliGRAM(s) IntraMuscular once  insulin glargine Injectable (LANTUS) 4 Unit(s) SubCutaneous at bedtime  insulin lispro (ADMELOG) corrective regimen sliding scale   SubCutaneous Before meals and at bedtime  ketotifen 0.025% Ophthalmic Solution 1 Drop(s) Both EYES two times a day  metoprolol succinate ER 50 milliGRAM(s) Oral daily  mirtazapine 7.5 milliGRAM(s) Oral at bedtime  nystatin    Suspension 340971 Unit(s) Oral four times a day  pantoprazole    Tablet 40 milliGRAM(s) Oral before breakfast  piperacillin/tazobactam IVPB.. 3.375 Gram(s) IV Intermittent every 8 hours  polyethylene glycol 3350 17 Gram(s) Oral daily  predniSONE   Tablet 20 milliGRAM(s) Oral daily  senna 2 Tablet(s) Oral at bedtime  simethicone 80 milliGRAM(s) Chew daily  sodium chloride 1 Gram(s) Oral three times a day  sodium chloride 3%  Inhalation 4 milliLiter(s) Inhalation every 12 hours    MEDICATIONS  (PRN):  bisacodyl 5 milliGRAM(s) Oral every 12 hours PRN Constipation  dextrose Oral Gel 15 Gram(s) Oral once PRN Blood Glucose LESS THAN 70 milliGRAM(s)/deciliter  fluticasone propionate 50 MICROgram(s)/spray Nasal Spray 1 Spray(s) Both Nostrils two times a day PRN nasal congestion    PRESENT SYMPTOMS: [ ]Unable to self-report  [ ] CPOT [ ] PAINADs [ ] RDOS  Source if other than patient:  [x]Family   [ ]Team     Pain: [ ]yes [x]no  QOL impact -   Location -                    Aggravating factors -  Quality -  Radiation -  Timing-  Severity (0-10 scale):  Minimal acceptable level (0-10 scale):     Dyspnea:                           [ ]Mild [x]Moderate [ ]Severe  Anxiety:                             [ ]Mild [ ]Moderate [ ]Severe  Fatigue:                             [ ]Mild [ ]Moderate [ ]Severe  Nausea:                             [ ]Mild [ ]Moderate [ ]Severe  Loss of appetite:              [ ]Mild [ ]Moderate [ ]Severe  Constipation:                    [ ]Mild [ ]Moderate [ ]Severe    PCSSQ[Palliative Care Spiritual Screening Question]   Severity (0-10):  Score of 4 or > indicate consideration of Chaplaincy referral.  Chaplaincy Referral: [ ] yes [ ] refused [ ] following [x] Deferred     Caregiver [place]? : [ ] yes [ ] no [x] Deferred [ ] Declined             Social work referral [ ] Patient & Family Centered Care Referral [ ]     Anticipatory Grief present?:  [ ] yes [ ] no  [x] Deferred                  Social work referral [ ] Chaplaincy Referral[ ]      Other Symptoms:  [ ]All other review of systems negative- unable to assess d/t mental status    Palliative Performance Status Version 2:        20 %    http://npcrc.org/files/news/palliative_performance_scale_ppsv2.pdf    PHYSICAL EXAM:  Vital Signs Last 24 Hrs  T(C): 36.7 (23 May 2023 09:00), Max: 36.7 (23 May 2023 09:00)  T(F): 98.1 (23 May 2023 09:00), Max: 98.1 (23 May 2023 09:00)  HR: 77 (23 May 2023 14:58) (62 - 93)  BP: 158/78 (23 May 2023 09:00) (126/60 - 158/78)  BP(mean): --  RR: 20 (23 May 2023 09:00) (18 - 20)  SpO2: 96% (23 May 2023 14:58) (96% - 98%)    Parameters below as of 23 May 2023 10:10  Patient On (Oxygen Delivery Method): nasal cannula     I&O's Summary    22 May 2023 07:01  -  23 May 2023 07:00  --------------------------------------------------------  IN: 0 mL / OUT: 1400 mL / NET: -1400 mL    23 May 2023 07:01  -  23 May 2023 15:26  --------------------------------------------------------  IN: 0 mL / OUT: 600 mL / NET: -600 mL      GENERAL: [ ]Cachexia    [ ]Alert  [ ]Oriented x   [x]Lethargic  [ ]Unarousable  [ ]Verbal  [ ]Non-Verbal  Behavioral:   [ ] Anxiety  [ ] Delirium [ ] Agitation [ ] Other  HEENT:  [ ]Normal   [x]Dry mouth   [ ]ET Tube/Trach  [ ]Oral lesions  PULMONARY:   [ ]Clear [ ]Tachypnea  [ ]Audible excessive secretions   [ ]Rhonchi        [ ]Right [ ]Left [ ]Bilateral  [ ]Crackles        [ ]Right [ ]Left [ ]Bilateral  [ ]Wheezing     [ ]Right [ ]Left [ ]Bilateral  [x]Diminished breath sounds [ ]right [ ]left [x]bilateral  CARDIOVASCULAR:    [x]Regular [ ]Irregular [ ]Tachy  [ ]Fabio [ ]Murmur [ ]Other  GASTROINTESTINAL:  [x]Soft  [ ]Distended   [ ]+BS  [x]Non tender [ ]Tender  [ ]Other [ ]PEG [ ]OGT/ NGT  Last BM:  GENITOURINARY:  [x]Normal [ ] Incontinent   [ ]Oliguria/Anuria   [ ]Rosenberg  MUSCULOSKELETAL:   [ ]Normal   [x]Weakness  [ ]Bed/Wheelchair bound [ ]Edema  NEUROLOGIC:   [x]No focal deficits  [ ]Cognitive impairment  [ ]Dysphagia [ ]Dysarthria [ ]Paresis [ ]Other   SKIN:   [ ]Normal  [ ]Rash  [ ]Other  [ ]Pressure ulcer(s)       Present on admission [ ]y [ ]n    CRITICAL CARE:  [ ] Shock Present  [ ]Septic [ ]Cardiogenic [ ]Neurologic [ ]Hypovolemic  [ ]  Vasopressors [ ]  Inotropes   [x ]Respiratory failure present [ ]Mechanical ventilation [x ]Non-invasive ventilatory support [ ]High flow  Mode: standby  [ ]Acute  [ ]Chronic [ ]Hypoxic  [ ]Hypercarbic [ ]Other  [ ]Other organ failure     LABS:                        10.3   10.86 )-----------( 189      ( 23 May 2023 06:55 )             34.2   05-23    133<L>  |  91<L>  |  13  ----------------------------<  54<LL>  3.9   |  41<H>  |  0.32<L>    Ca    9.4      23 May 2023 06:55  Phos  2.6     05-23  Mg     1.90     05-23    TPro  6.3  /  Alb  3.2<L>  /  TBili  0.2  /  DBili  x   /  AST  19  /  ALT  19  /  AlkPhos  123<H>  05-23      RADIOLOGY & ADDITIONAL STUDIES:   CXR 5/18  Impression:  No focal consolidation.  Bilateral chronic lung disease.        --- End of Report ---      PROTEIN CALORIE MALNUTRITION PRESENT: [ ]mild [ ]moderate [x]severe [ ]underweight [ ]morbid obesity  https://www.andeal.org/vault/2440/web/files/ONC/Table_Clinical%20Characteristics%20to%20Document%20Malnutrition-White%20JV%20et%20al%202012.pdf    Height (cm): 165.1 (05-18-23 @ 07:29), 157.5 (03-30-23 @ 07:39), 162.6 (09-28-22 @ 17:15)  Weight (kg): 43.6 (05-18-23 @ 07:29), 39.5 (03-30-23 @ 07:39), 45.9 (09-28-22 @ 17:15)  BMI (kg/m2): 16 (05-18-23 @ 07:29), 15.9 (03-30-23 @ 07:39), 17.4 (09-28-22 @ 17:15)    [x]PPSV2 < or = to 30% [ ]significant weight loss  [ ]poor nutritional intake  [ ]anasarca[ ]Artificial Nutrition      Other REFERRALS:  [ ]Hospice  [ ]Child Life  [ ]Social Work  [ ]Case management [ ]Holistic Therapy  HPI:  68F with hx of primary ciliary dyskinesia, HTN, chronic constipation, bronchiectasis on 4L NC w/ frequent admissions for bronchiectasis (4/22) presents with 3 days of AMS and vomiting, found to be hypercapneic by outpatient PCP; recommending going to the ED. Patient admitted to medicine for bronchiectasis exacerbation. Palliative Care consulted for goals of care.       (18 May 2023 07:06)    PERTINENT PM/SXH:   DM (diabetes mellitus)  Bronchitis  ABPA (allergic bronchopulmonary aspergillosis)  Bronchiectasis  Asthma  Hypertension  Vitamin D deficiency  Microcytic anemia  GERD (gastroesophageal reflux disease)  Postnasal drip  Pseudomonas aeruginosa colonization  Allergic rhinitis  Recurrent pneumonia  Type 2 diabetes mellitus, with long-term current use of insulin  Anxiety and depression  Bronchiectasis with acute exacerbation    History of cholecystectomy  S/P dilatation of esophageal stricture    FAMILY HISTORY:  Family history of diabetes mellitus  father    Family history of allergic rhinitis (Child, Child)  son, daughter    Family history of bronchitis  mother, father      Family Hx substance abuse [ ]yes [ ]no  ITEMS NOT CHECKED ARE NOT PRESENT    SOCIAL HISTORY:   Significant other/partner[x ]  Children[x]  Voodoo/Spirituality:  Substance hx:  [ ]   Tobacco hx:  [ ]   Alcohol hx: [ ]   Home Opioid hx:  [ ] I-Stop Reference No:   This report was requested by: Meche Sigala | Reference #: [id number]    Practitioner Count: 0  Pharmacy Count: 0  Current Opioid Prescriptions: 0  Current Benzodiazepine Prescriptions: 0  Current Stimulant Prescriptions: 0      Patient Demographic Information (PDI)       PDI	First Name	Last Name	Birth Date	Gender	Street Address	[facility]	Zip Code  DENIS Sky	1955	Female	[address] [facility]	[address]    Prescription Information      PDI Filter:    PDI	My Rx	Current Rx	Drug Type	Rx Written	Rx Dispensed	Drug	Quantity	Days Supply	Prescriber Name	Prescriber DALI #	Payment Method	Dispenser  A	N	N	B	12/09/2022	12/13/2022	lorazepam 2 mg tablet	60	30	DonisEdi DO	[id number]	Cash	[facility]  A	N	N	B	09/18/2022	09/20/2022	lorazepam 2 mg tablet	30	30	DonisEdi DO	[id number]	Cash	[facility]  A	N	N	B	05/29/2022	05/31/2022	lorazepam 2 mg tablet	30	30	DonisEdi DO	[id number]	Cash	[facility]    Living Situation: [x ]Home  [ ]Long term care  [ ]Rehab [ ]Other    ADVANCE DIRECTIVES:    DNR/MOLST  [x]  Living Will  [ ]   DECISION MAKER(s):  [ ] Health Care Proxy(s)  [x] Surrogate(s)  [ ] Guardian           Name(s): Lilia Odonnell  Phone Number(s): [phone]     BASELINE (I)ADL(s) (prior to admission):  [place]: [ ]Total  [ ] Moderate [x]Dependent    Allergies    No Known Allergies    Intolerances    MEDICATIONS  (STANDING):  acetylcysteine 10%  Inhalation 4 milliLiter(s) Inhalation daily  albuterol/ipratropium for Nebulization 3 milliLiter(s) Nebulizer every 6 hours  amLODIPine   Tablet 5 milliGRAM(s) Oral daily  budesonide 160 MICROgram(s)/formoterol 4.5 MICROgram(s) Inhaler 2 Puff(s) Inhalation two times a day  dextrose 5%. 1000 milliLiter(s) (100 mL/Hr) IV Continuous <Continuous>  dextrose 5%. 1000 milliLiter(s) (50 mL/Hr) IV Continuous <Continuous>  dextrose 50% Injectable 25 Gram(s) IV Push once  dextrose 50% Injectable 12.5 Gram(s) IV Push once  dextrose 50% Injectable 25 Gram(s) IV Push once  enoxaparin Injectable 30 milliGRAM(s) SubCutaneous every 24 hours  glucagon  Injectable 1 milliGRAM(s) IntraMuscular once  insulin glargine Injectable (LANTUS) 4 Unit(s) SubCutaneous at bedtime  insulin lispro (ADMELOG) corrective regimen sliding scale   SubCutaneous Before meals and at bedtime  ketotifen 0.025% Ophthalmic Solution 1 Drop(s) Both EYES two times a day  metoprolol succinate ER 50 milliGRAM(s) Oral daily  mirtazapine 7.5 milliGRAM(s) Oral at bedtime  nystatin    Suspension 574634 Unit(s) Oral four times a day  pantoprazole    Tablet 40 milliGRAM(s) Oral before breakfast  piperacillin/tazobactam IVPB.. 3.375 Gram(s) IV Intermittent every 8 hours  polyethylene glycol 3350 17 Gram(s) Oral daily  predniSONE   Tablet 20 milliGRAM(s) Oral daily  senna 2 Tablet(s) Oral at bedtime  simethicone 80 milliGRAM(s) Chew daily  sodium chloride 1 Gram(s) Oral three times a day  sodium chloride 3%  Inhalation 4 milliLiter(s) Inhalation every 12 hours    MEDICATIONS  (PRN):  bisacodyl 5 milliGRAM(s) Oral every 12 hours PRN Constipation  dextrose Oral Gel 15 Gram(s) Oral once PRN Blood Glucose LESS THAN 70 milliGRAM(s)/deciliter  fluticasone propionate 50 MICROgram(s)/spray Nasal Spray 1 Spray(s) Both Nostrils two times a day PRN nasal congestion    PRESENT SYMPTOMS: [ ]Unable to self-report  [ ] CPOT [ ] PAINADs [ ] RDOS  Source if other than patient:  [x]Family   [ ]Team     Pain: [ ]yes [x]no  QOL impact -   Location -                    Aggravating factors -  Quality -  Radiation -  Timing-  Severity (0-10 scale):  Minimal acceptable level (0-10 scale):     Dyspnea:                           [ ]Mild [x]Moderate [ ]Severe  Anxiety:                             [ ]Mild [ ]Moderate [ ]Severe  Fatigue:                             [ ]Mild [ ]Moderate [ ]Severe  Nausea:                             [ ]Mild [ ]Moderate [ ]Severe  Loss of appetite:              [ ]Mild [ ]Moderate [ ]Severe  Constipation:                    [ ]Mild [ ]Moderate [ ]Severe    PCSSQ[Palliative Care Spiritual Screening Question]   Severity (0-10):  Score of 4 or > indicate consideration of Chaplaincy referral.  Chaplaincy Referral: [ ] yes [ ] refused [ ] following [x] Deferred     Caregiver [place]? : [ ] yes [ ] no [x] Deferred [ ] Declined             Social work referral [ ] Patient & Family Centered Care Referral [ ]     Anticipatory Grief present?:  [ ] yes [ ] no  [x] Deferred                  Social work referral [ ] Chaplaincy Referral[ ]      Other Symptoms:  [ ]All other review of systems negative- unable to assess d/t mental status    Palliative Performance Status Version 2:        20 %    http://npcrc.org/files/news/palliative_performance_scale_ppsv2.pdf    PHYSICAL EXAM:  Vital Signs Last 24 Hrs  T(C): 36.7 (23 May 2023 09:00), Max: 36.7 (23 May 2023 09:00)  T(F): 98.1 (23 May 2023 09:00), Max: 98.1 (23 May 2023 09:00)  HR: 77 (23 May 2023 14:58) (62 - 93)  BP: 158/78 (23 May 2023 09:00) (126/60 - 158/78)  BP(mean): --  RR: 20 (23 May 2023 09:00) (18 - 20)  SpO2: 96% (23 May 2023 14:58) (96% - 98%)    Parameters below as of 23 May 2023 10:10  Patient On (Oxygen Delivery Method): nasal cannula     I&O's Summary    22 May 2023 07:01  -  23 May 2023 07:00  --------------------------------------------------------  IN: 0 mL / OUT: 1400 mL / NET: -1400 mL    23 May 2023 07:01  -  23 May 2023 15:26  --------------------------------------------------------  IN: 0 mL / OUT: 600 mL / NET: -600 mL      GENERAL: [ ]Cachexia    [ ]Alert  [ ]Oriented x   [x]Lethargic  [ ]Unarousable  [ ]Verbal  [ ]Non-Verbal  Behavioral:   [ ] Anxiety  [ ] Delirium [ ] Agitation [ ] Other  HEENT:  [ ]Normal   [x]Dry mouth   [ ]ET Tube/Trach  [ ]Oral lesions  PULMONARY:   [ ]Clear [ ]Tachypnea  [ ]Audible excessive secretions   [ ]Rhonchi        [ ]Right [ ]Left [ ]Bilateral  [ ]Crackles        [ ]Right [ ]Left [ ]Bilateral  [ ]Wheezing     [ ]Right [ ]Left [ ]Bilateral  [x]Diminished breath sounds [ ]right [ ]left [x]bilateral  CARDIOVASCULAR:    [x]Regular [ ]Irregular [ ]Tachy  [ ]Fabio [ ]Murmur [ ]Other  GASTROINTESTINAL:  [x]Soft  [ ]Distended   [ ]+BS  [x]Non tender [ ]Tender  [ ]Other [ ]PEG [ ]OGT/ NGT  Last BM: 5/21  GENITOURINARY:  [x]Normal [ ] Incontinent   [ ]Oliguria/Anuria   [ ]Rosenberg  MUSCULOSKELETAL:   [ ]Normal   [x]Weakness  [ ]Bed/Wheelchair bound [ ]Edema  NEUROLOGIC:   [x]No focal deficits  [ ]Cognitive impairment  [ ]Dysphagia [ ]Dysarthria [ ]Paresis [ ]Other   SKIN:   [ ]Normal  [ ]Rash  [ ]Other  [ ]Pressure ulcer(s)       Present on admission [ ]y [ ]n    CRITICAL CARE:  [ ] Shock Present  [ ]Septic [ ]Cardiogenic [ ]Neurologic [ ]Hypovolemic  [ ]  Vasopressors [ ]  Inotropes   [x ]Respiratory failure present [ ]Mechanical ventilation [x ]Non-invasive ventilatory support [ ]High flow  Mode: standby  [ ]Acute  [ ]Chronic [ ]Hypoxic  [ ]Hypercarbic [ ]Other  [ ]Other organ failure     LABS:                        10.3   10.86 )-----------( 189      ( 23 May 2023 06:55 )             34.2   05-23    133<L>  |  91<L>  |  13  ----------------------------<  54<LL>  3.9   |  41<H>  |  0.32<L>    Ca    9.4      23 May 2023 06:55  Phos  2.6     05-23  Mg     1.90     05-23    TPro  6.3  /  Alb  3.2<L>  /  TBili  0.2  /  DBili  x   /  AST  19  /  ALT  19  /  AlkPhos  123<H>  05-23      RADIOLOGY & ADDITIONAL STUDIES:   CXR 5/18  Impression:  No focal consolidation.  Bilateral chronic lung disease.        --- End of Report ---      PROTEIN CALORIE MALNUTRITION PRESENT: [ ]mild [ ]moderate [x]severe [ ]underweight [ ]morbid obesity  https://www.andeal.org/vault/2440/web/files/ONC/Table_Clinical%20Characteristics%20to%20Document%20Malnutrition-White%20JV%20et%20al%202012.pdf    Height (cm): 165.1 (05-18-23 @ 07:29), 157.5 (03-30-23 @ 07:39), 162.6 (09-28-22 @ 17:15)  Weight (kg): 43.6 (05-18-23 @ 07:29), 39.5 (03-30-23 @ 07:39), 45.9 (09-28-22 @ 17:15)  BMI (kg/m2): 16 (05-18-23 @ 07:29), 15.9 (03-30-23 @ 07:39), 17.4 (09-28-22 @ 17:15)    [x]PPSV2 < or = to 30% [ ]significant weight loss  [ ]poor nutritional intake  [ ]anasarca[ ]Artificial Nutrition      Other REFERRALS:  [ ]Hospice  [ ]Child Life  [ ]Social Work  [ ]Case management [ ]Holistic Therapy

## 2023-05-23 NOTE — CONSULT NOTE ADULT - ASSESSMENT
68F with hx of primary ciliary dyskinesia, HTN, chronic constipation, bronchiectasis on 4L NC w/ frequent admissions for bronchiectasis (4/22) presents with 3 days of AMS and vomiting, found to be hypercapneic by outpatient PCP; recommending going to the ED. Patient admitted to medicine for bronchiectasis exacerbation. Palliative Care consulted for goals of care.

## 2023-05-23 NOTE — PROGRESS NOTE ADULT - PROBLEM SELECTOR PLAN 5
Rosibel Baker 1972     Office/Outpatient Visit    Visit Date: Tue, Oct 30, 2018 09:30 am    Provider: Andree Sandhu N.P. (Assistant: Stefanie Salinas MA)    Location: [facility]        Electronically signed by Andree Sandhu N.P. on  10/30/2018 12:14:10 PM                             SUBJECTIVE:        CC: Pt also seen by XENA Ferrara NP Student     Mrs. Baker is a 46 year old White female.  presents today due to complaints of ongoing ear pain X 2 weeks, was seen last Saturday for this issue and symptoms have gotten worse since.          HPI:         Patient to be evaluated for acute upper respiratory infection of multiple sites.  Reports increased L ear pain since being seen on Saturday wth sore throat pain;  States she has been taking Ominicef as scheduled without relief;     ROS:     CONSTITUTIONAL:  Positive for chills.   Negative for fever.      EYES:  Negative for blurred vision, eye drainage and eye pain.      E/N/T:  Positive for ear pain ( left ), tinnitus and sore throat.   Negative for frequent rhinorrhea.      CARDIOVASCULAR:  Negative for chest pain, orthopnea, paroxysmal nocturnal dyspnea and pedal edema.      RESPIRATORY:  Negative for dyspnea.      GASTROINTESTINAL:  Negative for abdominal pain, constipation, diarrhea, nausea and vomiting.      NEUROLOGICAL:  Negative for dizziness, headaches, paresthesias, and weakness.      PSYCHIATRIC:  Negative for anxiety, depression, and sleep disturbances.          PMH/FMH/SH:     Last Reviewed on 10/27/2018 10:17 AM by Andree Sandhu    Past Medical History:             PAST MEDICAL HISTORY     UNREMARKABLE         GYNECOLOGICAL HISTORY:    G1 (twins) P2    Current method of contraception is Essure;     Chronic Pain: since 2014; affecting the low back;         PREVENTIVE HEALTH MAINTENANCE             MAMMOGRAM: Done within last 2 years and results in are chart was last done 10/2017 with normal results         Surgical History:          Cholecystectomy: laparoscopic; 4-2013;      Tonsillectomy/Adenoidectomy   Uterine Ablation     Procedures: LEEP procedure RADIOACTIVE THYROID ABLATION 2002 uterine polyp removal july/2017     Hysterectomy: 4-12-18;         Family History:     Father: Healthy     Mother: MVP;  anemia     Brother(s): 0 brother(s) total     Sister(s): 0 sister(s) total         Social History:     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          Alcohol:  Does not drink alcohol and never has.          Substance Abuse History:     Last Reviewed on 10/27/2018 10:17 AM by Andree Sandhu        Mental Health History:     Last Reviewed on 10/27/2018 10:17 AM by Andree Sandhu        Communicable Diseases (eg STDs):     Last Reviewed on 10/27/2018 10:17 AM by Andree Sandhu            Current Problems:     Last Reviewed on 10/27/2018 10:17 AM by Andree Sandhu    Diastolic dysfunction-Grade 1     Seizures     Bilateral polycystic ovarian syndrome     Obesity, NOS     Vitamin D deficiency, unspecified     Depression     Low back pain     Cigarette smoking     Acquired hypothyroidism, postablative     R otitis media         Immunizations:     Fluzone (3 + years dose) 10/19/2017         Allergies:     Last Reviewed on 10/27/2018 10:17 AM by Andree Sandhu    Augmentin: nausea (Adverse Reaction)    Diclofenac:    Sulfas:        Current Medications:     Last Reviewed on 10/27/2018 10:17 AM by Andree Sandhu    Bromfed-DM 2mg/10mg/30mg per 5ml Syrup 1  to 2  tsp po every 4-6 hrs prn cough/congestion.     Cefdinir 300mg Capsules 1 bid for 10 days     Cymbalta 60mg Capsules, Delayed Release Take 1 capsule(s) by mouth daily     Mobic 15mg Tablet 1 tab daily     Doxycycline Monohydrate 100mg Capsules  Take 1 capsule(s) by mouth bid     Tylenol         OBJECTIVE:        Vitals:         Current: 10/30/2018 9:35:10 AM    Ht:  5 ft, 6 in;  Wt: 271.8 lbs;  BMI: 43.9    T: 98.5 F (oral);  BP: 134/96 mm Hg (left arm, sitting);  P: 106 bpm (left arm (BP Cuff), sitting);  sCr: 0.7 mg/dL;  GFR: 133.45        Exams:     PHYSICAL EXAM:     GENERAL: vital signs recorded - well developed, well nourished;  appears ill, seems to be in moderate pain;     EYES: conjunctiva and cornea are normal; PERRLA;     E/N/T: EARS: external auditory canal draining on the left and erythematous on the left;  right TM is normal;  NOSE:  normal nasal mucosa, septum, turbinates, and sinuses; OROPHARYNX: posterior pharynx shows erythematous anterior tonsillar pillars and erythema;     NECK: range of motion is normal;     RESPIRATORY: normal respiratory rate and pattern with no distress; diffuse expiratory wheezes;     CARDIOVASCULAR: normal rate; rhythm is regular;  no systolic murmur; no edema;     GASTROINTESTINAL: nontender; normal bowel sounds; no organomegaly;     NEUROLOGICAL:  cranial nerves, motor and sensory function, reflexes, gait and coordination are all intact;     PSYCHIATRIC:  appropriate affect and demeanor; normal speech pattern; grossly normal memory;         Lab/Test Results:             Rapid Strep Screen:  Positive (10/30/2018),     Performed by::  martha (10/30/2018),             Procedures:     Streptococcal pharyngitis     1. Toradol 60 mg given IM in the right hip; administered by AS;  lot number [id number]; expires 02/2020             ASSESSMENT:           034.0   J02.0  Streptococcal pharyngitis              DDx:     278.00   E66.9  Obesity, NOS              DDx:         ORDERS:         Meds Prescribed:       Amoxicillin 875mg Tablet One PO BID X 10 days.  #20 (Twenty) tablet(s) Refills: 0         Lab Orders:       [id number]  BDSTR - [facility] Rapid strep A  (In-House)         15902  EAR - [facility] Ear Culture  (Send-Out)         APPTO   Appointment need  (In-House)           Other Orders:       [id number]  Therapeutic injection  (In-House)           Calculated BMI above the upper parameter and a follow-up plan was documented in the medical record  (In-House)           Toradol, per 15 mg (x4)                 PLAN:          Streptococcal pharyngitis     LABORATORY:  Labs ordered to be performed today include Ear culture and rapid strep test.  Narcotic or pain medication Toradol 60 mg     FOLLOW-UP: Schedule a follow-up visit in 1 month..   for Well Woman Exam           Prescriptions: Recommended probiotics.       Amoxicillin 875mg Tablet One PO BID X 10 days.  #20 (Twenty) tablet(s) Refills: 0           Orders:       [id number]  BDSTR - [facility] Rapid strep A  (In-House)         [id number]  EAR - [facility] Ear Culture  (Send-Out)         [id number]  Therapeutic injection  (In-House)                     Toradol, per 15 mg (x4)       APPTO  Appointment need  (In-House)             Patient Education Handouts:       Strep Throat (Streptococcal Pharyngitis)           Obesity, NOS     MIPS     BMI Elevated - Follow-Up Plan: She was provided education on weight loss strategies           Orders:         Calculated BMI above the upper parameter and a follow-up plan was documented in the medical record  (In-House)               Patient Recommendations:        For  Streptococcal pharyngitis:     Schedule a follow-up visit in 1 month.                APPOINTMENT INFORMATION:        Monday Tuesday Wednesday Thursday Friday Saturday Sunday            Time:___________________AM  PM   Date:_____________________             CHARGE CAPTURE:           Primary Diagnosis:     034.0 Streptococcal pharyngitis            J02.0    Streptococcal pharyngitis              Orders:          [id number]   Office/outpatient visit; established patient, level 3  (In-House)             88071   BDSTR - [facility] Rapid strep A  (In-House)             38370   Therapeutic injection  (In-House)                                            Toradol, per 15 mg (x4)           APPTO   Appointment need  (In-House)           278.00 Obesity, NOS            E66.9    Obesity, unspecified              Orders:             Calculated BMI above the upper parameter and a follow-up plan was documented in the medical record  (In-House)               ADDENDUMS:      ____________________________________    Date: 11/15/2018 12:03 PM    Author: Rosibel Gonzalez         Visit Note Faxed to:        Margarito Lopez  (Otolaryngology); Number [phone]                Pt on prednisone 20mg w/ hyperglycemia on hospital labs.   - Will increase Lantus from 2U to 7U  - Add mealtime admelog 2U TID  - c/w ISS  - c/t monitor glucose.

## 2023-05-23 NOTE — PROGRESS NOTE ADULT - ASSESSMENT
67 y/o F PMHx primary ciliary dyskinesia and bronchiectasis (on home NC), presented with AMS. Found to have bronchiectasis exacerbation.     RVP neg   Sputum Cx (5/18):  pseudomonas  CXR: Bilateral extensive peribronchial thickening worse in the apices unchanged.    Impression:  #Bronchiectasis Exacerbation  #Altered Mental Status    Recommendations:   - continue Zosyn 3.375g IV q8H   - plan for total 10 day course of antibiotic  - monitor respiratory status  - monitor temp, WBCs      Alessandro Ochoa MD  Infectious Disease Fellow  Available on [employer] Teams  Before 9AM or after 5PM: [phone]  67 y/o F PMHx primary ciliary dyskinesia and bronchiectasis (on home NC), presented with AMS. Found to have bronchiectasis exacerbation.     RVP neg   Sputum Cx (5/18):  pseudomonas  CXR: Bilateral extensive peribronchial thickening worse in the apices unchanged.    Impression:  #Bronchiectasis Exacerbation  #Altered Mental Status    Recommendations:   - plan for total 10 day course of antibiotic. Continue Zosyn 3.375g IV q8H while admitted. Switch to PO levofloxacin 750mg q24Hto complete 10 days if discharged.  - monitor respiratory status  - monitor temp, WBCs      Alessandro Ochoa MD  Infectious Disease Fellow  Available on [employer] Teams  Before 9AM or after 5PM: [phone]

## 2023-05-23 NOTE — PROGRESS NOTE ADULT - ASSESSMENT
67 yo F PMH PCD c/b bronchiectasis w/ multiple admissions for bronchiectasis exacerbation, HTN, COVID infection 1/2022, Pseudomonas and ESBL klebsiella, chronic hypoxemic on 2-4LNC and hypercapneic respiratory failure on AVAPS referred from outpatient pulmonologist for worsening mental status and lethargy found to be severely hyponatremia. Pulmonary consulted for bronchiectasis management and hypercarbic respiratory failure    - continue empiric abx, ID recommendations appreciated.  - Continue steroids  - please maintain on q6 duonebs, q12 hypersal, mucomyst, and aerobika; chest PT and physical therapy  - TTE reviewed  - VBG has improved, pH is 7.4, pCO2 80  - Would continue AVAPS as needed and at night  - would recommend palliative care evaluation if does not improve, patient is now DNR/DNI  - Long discussion at bedside with family yesterday, for now they want to continue medical management and attempt AVAPS  - Will continue goals of care discussions with family as prognosis remains guarded, patient is AVAPS dependent for most of the day  - pulmonary will follow    Carson Ramsay, PGY-7  Pulmonary and Critical Care Medicine  [id number]

## 2023-05-23 NOTE — CONSULT NOTE ADULT - PROBLEM SELECTOR RECOMMENDATION 9
Secondary to known chronic bronchiectasis, prior to admission was on AVAPs qHS at home, now increasingly dependent on it.  -antibiotics per cultures and sensitivities   -management as per pulm   -discussed trial of low dose morphine solution to assist with symptoms, would recommend Morphine solution 3 mg q6hr PRN with holding parameters for sedation and RR <10.

## 2023-05-23 NOTE — PROGRESS NOTE ADULT - ATTENDING COMMENTS
Agree with above.     Overall poor prognosis but family still would like to pursue AVAPS treatment. Increase of TV with AVAPS has helped with the reduction of CO2 and patient is mentating better. Appreciate Palliative care consult given overall poor prognosis. If family wants to be aggressive would keep NPO but if focusing on comfort can feed knowing that there is a risk for aspiration.

## 2023-05-23 NOTE — PROGRESS NOTE ADULT - SUBJECTIVE AND OBJECTIVE BOX
Follow Up:  bronchiectasis exacerbation    Interval History/ROS:  Family at bedside. Patient remains altered, unable to provide history  On nasal cannula, no fever    Allergies  No Known Allergies    ANTIMICROBIALS:  nystatin    Suspension 853830 four times a day  piperacillin/tazobactam IVPB.. 3.375 every 8 hours    OTHER MEDS:  MEDICATIONS  (STANDING):  acetylcysteine 10%  Inhalation 4 daily  albuterol/ipratropium for Nebulization 3 every 6 hours  amLODIPine   Tablet 5 daily  bisacodyl 5 every 12 hours PRN  budesonide 160 MICROgram(s)/formoterol 4.5 MICROgram(s) Inhaler 2 two times a day  dextrose 50% Injectable 25 once  dextrose 50% Injectable 12.5 once  dextrose 50% Injectable 25 once  dextrose Oral Gel 15 once PRN  enoxaparin Injectable 30 every 24 hours  glucagon  Injectable 1 once  insulin glargine Injectable (LANTUS) 4 at bedtime  insulin lispro (ADMELOG) corrective regimen sliding scale  Before meals and at bedtime  metoprolol succinate ER 50 daily  mirtazapine 7.5 at bedtime  pantoprazole    Tablet 40 before breakfast  polyethylene glycol 3350 17 daily  predniSONE   Tablet 20 daily  senna 2 at bedtime  simethicone 80 daily  sodium chloride 3%  Inhalation 4 every 12 hours    Vital Signs Last 24 Hrs  T(C): 36.7 (23 May 2023 09:00), Max: 36.7 (23 May 2023 09:00)  T(F): 98.1 (23 May 2023 09:00), Max: 98.1 (23 May 2023 09:00)  HR: 76 (23 May 2023 10:11) (62 - 93)  BP: 158/78 (23 May 2023 09:00) (126/60 - 158/78)  BP(mean): --  RR: 20 (23 May 2023 09:00) (18 - 20)  SpO2: 97% (23 May 2023 10:11) (96% - 98%)    Parameters below as of 23 May 2023 10:10  Patient On (Oxygen Delivery Method): nasal cannula    PHYSICAL EXAM:  Constitutional: uncomfortable appearing  HEAD/EYES: anicteric, no conjunctival injection  Respiratory:  tachypneic, on nasal cannula   GI:  soft, non-tender  Neurologic: awake, confused  Skin:  no phlebitis                         10.3   10.86 )-----------( 189      ( 23 May 2023 06:55 )             34.2       05-23    133<L>  |  91<L>  |  13  ----------------------------<  54<LL>  3.9   |  41<H>  |  0.32<L>    Ca    9.4      23 May 2023 06:55  Phos  2.6     05-23  Mg     1.90     05-23    TPro  6.3  /  Alb  3.2<L>  /  TBili  0.2  /  DBili  x   /  AST  19  /  ALT  19  /  AlkPhos  123<H>  05-23      MICROBIOLOGY:  Culture - Sputum (collected 18 May 2023 18:34)  Source: .Sputum Sputum  Gram Stain (19 May 2023 05:43):    Moderate polymorphonuclear leukocytes per low power field    Few Squamous epithelial cells per low power field    Moderate Gram Positive Rods seen per oil power field    Moderate Gram Negative Rods seen per oil power field    Moderate Gram Variable Cocci seen per oil power field    Rare Yeast like cells seen per oil power field  Final Report (22 May 2023 17:03):    Moderate Pseudomonas aeruginosa    Normal Respiratory Denise present  Organism: Pseudomonas aeruginosa (22 May 2023 17:03)  Organism: Pseudomonas aeruginosa (22 May 2023 17:03)      -  Levofloxacin: S <=0.5      -  Tobramycin: S <=2      -  Aztreonam: S <=4      -  Gentamicin: S <=2      -  Cefepime: S <=2      -  Piperacillin/Tazobactam: S <=8      -  Ciprofloxacin: S 0.5      -  Imipenem: S <=1      Method Type: ELIEZER      -  Meropenem: S <=1      -  Ceftazidime: S <=1      -  Amikacin: S <=16    Rapid RVP Result: NotDetec (05-18 @ 14:35)    RADIOLOGY:  < from: US Duplex Venous Lower Ext Complete, Bilateral (05.18.23 @ 15:45) >  IMPRESSION:  Wall thickening of the left common femoral vein which is compressible,   which represent chronic post thrombotic changes. No evidence of acute   DVT. Consider short interval follow-up exam.  < end of copied text >    < from: Xray Chest 2 Views PA/Lat (05.18.23 @ 00:58) >  Impression:  No focal consolidation.  Bilateral chronic lung disease.  < end of copied text >     Follow Up:  bronchiectasis exacerbation    Interval History/ROS:  Family at bedside. Patient more awake but remains altered   On nasal cannula, no fever    Allergies  No Known Allergies    ANTIMICROBIALS:  nystatin    Suspension [id number] four times a day  piperacillin/tazobactam IVPB.. 3.375 every 8 hours    OTHER MEDS:  MEDICATIONS  (STANDING):  acetylcysteine 10%  Inhalation 4 daily  albuterol/ipratropium for Nebulization 3 every 6 hours  amLODIPine   Tablet 5 daily  bisacodyl 5 every 12 hours PRN  budesonide 160 MICROgram(s)/formoterol 4.5 MICROgram(s) Inhaler 2 two times a day  dextrose 50% Injectable 25 once  dextrose 50% Injectable 12.5 once  dextrose 50% Injectable 25 once  dextrose Oral Gel 15 once PRN  enoxaparin Injectable 30 every 24 hours  glucagon  Injectable 1 once  insulin glargine Injectable (LANTUS) 4 at bedtime  insulin lispro (ADMELOG) corrective regimen sliding scale  Before meals and at bedtime  metoprolol succinate ER 50 daily  mirtazapine 7.5 at bedtime  pantoprazole    Tablet 40 before breakfast  polyethylene glycol 3350 17 daily  predniSONE   Tablet 20 daily  senna 2 at bedtime  simethicone 80 daily  sodium chloride 3%  Inhalation 4 every 12 hours    Vital Signs Last 24 Hrs  T(C): 36.7 (23 May 2023 09:00), Max: 36.7 (23 May 2023 09:00)  T(F): 98.1 (23 May 2023 09:00), Max: 98.1 (23 May 2023 09:00)  HR: 76 (23 May 2023 10:11) (62 - 93)  BP: 158/78 (23 May 2023 09:00) (126/60 - 158/78)  BP(mean): --  RR: 20 (23 May 2023 09:00) (18 - 20)  SpO2: 97% (23 May 2023 10:11) (96% - 98%)    Parameters below as of 23 May 2023 10:10  Patient On (Oxygen Delivery Method): nasal cannula    PHYSICAL EXAM:  Constitutional: uncomfortable appearing  HEAD/EYES: anicteric, no conjunctival injection  Respiratory:  tachypneic, on nasal cannula   GI:  soft, non-tender  Neurologic: awake, confused  Skin:  no phlebitis                         10.3   10.86 )-----------( 189      ( 23 May 2023 06:55 )             34.2       05-23    133<L>  |  91<L>  |  13  ----------------------------<  54<LL>  3.9   |  41<H>  |  0.32<L>    Ca    9.4      23 May 2023 06:55  Phos  2.6     05-23  Mg     1.90     05-23    TPro  6.3  /  Alb  3.2<L>  /  TBili  0.2  /  DBili  x   /  AST  19  /  ALT  19  /  AlkPhos  123<H>  05-23      MICROBIOLOGY:  Culture - Sputum (collected 18 May 2023 18:34)  Source: .Sputum Sputum  Gram Stain (19 May 2023 05:43):    Moderate polymorphonuclear leukocytes per low power field    Few Squamous epithelial cells per low power field    Moderate Gram Positive Rods seen per oil power field    Moderate Gram Negative Rods seen per oil power field    Moderate Gram Variable Cocci seen per oil power field    Rare Yeast like cells seen per oil power field  Final Report (22 May 2023 17:03):    Moderate Pseudomonas aeruginosa    Normal Respiratory Denise present  Organism: Pseudomonas aeruginosa (22 May 2023 17:03)  Organism: Pseudomonas aeruginosa (22 May 2023 17:03)      -  Levofloxacin: S <=0.5      -  Tobramycin: S <=2      -  Aztreonam: S <=4      -  Gentamicin: S <=2      -  Cefepime: S <=2      -  Piperacillin/Tazobactam: S <=8      -  Ciprofloxacin: S 0.5      -  Imipenem: S <=1      Method Type: ELIEZER      -  Meropenem: S <=1      -  Ceftazidime: S <=1      -  Amikacin: S <=16    Rapid RVP Result: NotDetec (05-18 @ 14:35)    RADIOLOGY:  < from: US Duplex Venous Lower Ext Complete, Bilateral (05.18.23 @ 15:45) >  IMPRESSION:  Wall thickening of the left common femoral vein which is compressible,   which represent chronic post thrombotic changes. No evidence of acute   DVT. Consider short interval follow-up exam.  < end of copied text >    < from: Xray Chest 2 Views PA/Lat (05.18.23 @ 00:58) >  Impression:  No focal consolidation.  Bilateral chronic lung disease.  < end of copied text >

## 2023-05-23 NOTE — PROGRESS NOTE ADULT - PROBLEM SELECTOR PLAN 2
Patient with acute metabolic encephalopathy likely iso hypercapnia and sepsis 2/2 psuedomonas pneumonia.   - Will c/w Meropenum and cipro  - F/u ID recs  - c/w AVAPS and f/u VBG.

## 2023-05-23 NOTE — PROGRESS NOTE ADULT - SUBJECTIVE AND OBJECTIVE BOX
CHIEF COMPLAINT: Acute Hypercapnic Respiratory Failure    Interval Events: VBG improved after AVAPS adjustment, patient now awake and eating breakfast this AM.    REVIEW OF SYSTEMS:  Constitutional: No fevers or chills.   Eyes: No itching or discharge from the eyes  ENT: No ear pain. No ear discharge. No nasal congestion.   CV: No chest pain. No palpitations. No lightheadedness or dizziness.   Resp: No dyspnea at rest.   GI: No nausea. No vomiting. No diarrhea.  MSK: No joint pain or pain in any extremities  Integumentary: No skin lesions. No pedal edema.  Neurological: No gross motor weakness. No sensory changes.  [x] All other systems negative  [ ] Unable to assess ROS because ________    OBJECTIVE:  ICU Vital Signs Last 24 Hrs  T(C): 36.7 (23 May 2023 09:00), Max: 36.7 (23 May 2023 09:00)  T(F): 98.1 (23 May 2023 09:00), Max: 98.1 (23 May 2023 09:00)  HR: 93 (23 May 2023 09:00) (62 - 93)  BP: 158/78 (23 May 2023 09:00) (126/60 - 158/78)  BP(mean): --  ABP: --  ABP(mean): --  RR: 20 (23 May 2023 09:00) (18 - 20)  SpO2: 96% (23 May 2023 09:00) (96% - 100%)    O2 Parameters below as of 23 May 2023 09:00  Patient On (Oxygen Delivery Method): nasal cannula  O2 Flow (L/min): 3        Mode: NIV (Noninvasive Ventilation), RR (machine): 18, TV (machine): 450, FiO2: 35, PEEP: 8, ITime: 0.9, P-High: 30, P-Low: 20, PIP: 22    05-22 @ 07:01  -  05-23 @ 07:00  --------------------------------------------------------  IN: 0 mL / OUT: 1400 mL / NET: -1400 mL    05-23 @ 07:01 - 05-23 @ 10:00  --------------------------------------------------------  IN: 0 mL / OUT: 600 mL / NET: -600 mL      CAPILLARY BLOOD GLUCOSE      POCT Blood Glucose.: 78 mg/dL (23 May 2023 08:00)      PHYSICAL EXAM:  General: Awake and alert, sitting up in bed.  HEENT: Atraumatic, normocephalic.   Lymph Nodes: No palpable lymphadenopathy  Neck: No JVD. No carotid bruit.   Respiratory: Normal chest expansion. Coarse breath sounds  Cardiovascular: S1 S2 normal. No murmurs, rubs or gallops.   Abdomen: Soft, non-tender, non-distended. No organomegaly.  Extremities: Warm to touch. Peripheral pulse palpable. No pedal edema.   Skin: No rashes or skin lesions  Neurological: Motor and sensory examination equal and normal in all four extremities.  Psychiatry: Appropriate mood and affect.    HOSPITAL MEDICATIONS:  MEDICATIONS  (STANDING):  acetylcysteine 10%  Inhalation 4 milliLiter(s) Inhalation daily  albuterol/ipratropium for Nebulization 3 milliLiter(s) Nebulizer every 6 hours  amLODIPine   Tablet 5 milliGRAM(s) Oral daily  budesonide 160 MICROgram(s)/formoterol 4.5 MICROgram(s) Inhaler 2 Puff(s) Inhalation two times a day  dextrose 5%. 1000 milliLiter(s) (100 mL/Hr) IV Continuous <Continuous>  dextrose 5%. 1000 milliLiter(s) (50 mL/Hr) IV Continuous <Continuous>  dextrose 50% Injectable 25 Gram(s) IV Push once  dextrose 50% Injectable 12.5 Gram(s) IV Push once  dextrose 50% Injectable 25 Gram(s) IV Push once  enoxaparin Injectable 30 milliGRAM(s) SubCutaneous every 24 hours  glucagon  Injectable 1 milliGRAM(s) IntraMuscular once  insulin glargine Injectable (LANTUS) 4 Unit(s) SubCutaneous at bedtime  insulin lispro (ADMELOG) corrective regimen sliding scale   SubCutaneous Before meals and at bedtime  ketotifen 0.025% Ophthalmic Solution 1 Drop(s) Both EYES two times a day  metoprolol succinate ER 50 milliGRAM(s) Oral daily  mirtazapine 7.5 milliGRAM(s) Oral at bedtime  nystatin    Suspension 598493 Unit(s) Oral four times a day  pantoprazole    Tablet 40 milliGRAM(s) Oral before breakfast  piperacillin/tazobactam IVPB.. 3.375 Gram(s) IV Intermittent every 8 hours  polyethylene glycol 3350 17 Gram(s) Oral daily  predniSONE   Tablet 20 milliGRAM(s) Oral daily  senna 2 Tablet(s) Oral at bedtime  simethicone 80 milliGRAM(s) Chew daily  sodium chloride 1 Gram(s) Oral three times a day  sodium chloride 3%  Inhalation 4 milliLiter(s) Inhalation every 12 hours    MEDICATIONS  (PRN):  bisacodyl 5 milliGRAM(s) Oral every 12 hours PRN Constipation  dextrose Oral Gel 15 Gram(s) Oral once PRN Blood Glucose LESS THAN 70 milliGRAM(s)/deciliter  fluticasone propionate 50 MICROgram(s)/spray Nasal Spray 1 Spray(s) Both Nostrils two times a day PRN nasal congestion      LABS:                        10.3   10.86 )-----------( 189      ( 23 May 2023 06:55 )             34.2     05-23    133<L>  |  91<L>  |  13  ----------------------------<  54<LL>  3.9   |  41<H>  |  0.32<L>    Ca    9.4      23 May 2023 06:55  Phos  2.6     05-23  Mg     1.90     05-23    TPro  6.3  /  Alb  3.2<L>  /  TBili  0.2  /  DBili  x   /  AST  19  /  ALT  19  /  AlkPhos  123<H>  05-23          Venous Blood Gas:  05-23 @ 06:55  7.40/81/78/50/97.7  VBG Lactate: 0.8  Venous Blood Gas:  05-22 @ 17:15  7.38/82/70/48/95.9  VBG Lactate: 0.8  Venous Blood Gas:  05-22 @ 13:58  7.28/105/122/49/98.8  VBG Lactate: 0.8      MICROBIOLOGY:     RADIOLOGY:  [ ] Reviewed and interpreted by me    Point of Care Ultrasound Findings:    PFT:    EKG:

## 2023-05-23 NOTE — PROGRESS NOTE ADULT - ATTENDING COMMENTS
68F Primary Ciliary Dyskinesia with bronchiectasis – Home O2/AVAPS, HTN, p/w acute on chronic hypoxic hypercarbic respiratory failure from Pseudomonas PNA c/b acute encephalopathy, Hyponatremia, steroid induced DM2.  - Will observe with AVAPS on at night and PRN during the daytime to allow PO intake and decrease risk for pressure injury to the face with the mask.   - obtain ABG and coordinate with Pulm about any potential change to the AVAPS setting.  - appears to be poor prognosis due to advancing pulmonary dysfunction  - Pulm recommend Palliative Care consult - required consult from Palliative care team on 5/22.  - Continue Meropenem IV, Cipro PO - appreciate ID recs  - Hyperglycemia and hypoglycemia - lantus qHS, stopped admelog; however had good PO intake this AM.  - Dispo: still unclear as patient is medically active.  - discussed with family by the bedside for 15minutes on 5/23. Answered all the questions. 68F Primary Ciliary Dyskinesia with bronchiectasis – Home O2/AVAPS, HTN, p/w acute on chronic hypoxic hypercarbic respiratory failure from Pseudomonas PNA c/b acute encephalopathy, Hyponatremia, steroid induced DM2.  - Will observe with AVAPS on at night and PRN during the daytime to allow PO intake and decrease risk for pressure injury to the face with the mask.   - obtain ABG and coordinate with Pulm about any potential change to the AVAPS setting.  - appears to be poor prognosis due to advancing pulmonary dysfunction  - Pulm recommend Palliative Care consult - required consult from Palliative care team on 5/22.  - Meropenem IV, Cipro PO to Zosyn IV until 5/25 - appreciate ID recs  - Hyperglycemia and hypoglycemia - lantus qHS, stopped admelog; however had good PO intake this AM.  - Dispo: still unclear as patient is medically active.  - discussed with family by the bedside for 15minutes on 5/23. Answered all the questions.

## 2023-05-23 NOTE — PROGRESS NOTE ADULT - PROBLEM SELECTOR PLAN 8
Diet: CC/DASH no beef/pork  Psych/Sleep: None FRANCES  GI: Protonix  DVT ppx: Lovenox 40mg   Code Status: Pending further discussion  Dispo: Pending resolution of hyponatremia and hypercapnia. Diet: CC/DASH no beef/pork  Psych/Sleep: None FRANCES  GI: Protonix  DVT ppx: Lovenox 40mg   Code Status: Pending further discussion and palliative consult  Dispo: Pending resolution of hypercapnia.

## 2023-05-23 NOTE — CONSULT NOTE ADULT - ATTENDING COMMENTS
Patient is a 69 yo F w/ PCD c/b bronchiectasis w/ multiple admissions for bronchiectasis exacerbation (Pseudomonas and ESBL klebsiella), chronic hypoxemic and hypercapnic respiratory failure (2-4 L NC/AVAPS), HTN, prior COVID 1/2022 who was advised by her pulmonologist Dr. Young to present to ED for AMS and lethargy.    #Acute on chronic respiratory failure - AMS likely multifactorial in setting of acute on chronic hypercapnia as well as hyponatremia, lower than usual. Initial VBG 7.2/107/66 with repeat 7.31/75/132. Family endorses recent increased thicker sputum. Endorses compliance with AVAPS for at least 5 to 6 hours per night. Endorses compliance with airway clearance regimen  #Bronchiectasis  #Primary Ciliary Dyskinesia  - Agree with empiric antibiotics for bronchiectasis exacerbation. Currently ordered for Vanc/Cefepime. Recommend ID consult given history of resistant organisms  - Please check sputum cultures  - c/w Duonebs q6h, 3% q12h, mucomyst. Aerobika q6h  - c/w AVAPS PRN and qhs  - Recommend TTE as well as LE duplex as well for LE edema    Caleb Bailey MD  Pulmonary & Critical Care
68 year old with ciliary dyskinesia and bronchiectasis requiring bipap and supplemental oxygen at home, presents with change in mental status due to hypoxia/ hypercapnia.    Bronchiectasis exacerbation    H/o esbl and pseudomonas colonization    At this time, respiratory status is tenuous    Prior cultures with ESBL, pseudomonas (various strains), Maddison martel    Follow up sputum culture    Empiric meropenem and cipro pending culture data
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 and night time AVAPS w/ frequent episodes of admission due to bronchiectasis, HTN, chronic constipation presenting with lethargy and AMS. MICU consulted for AMS 2/2 acute hypercapnic resp failure  would place patient on home AVAPS  repeat blood gas improved while on AVAPS  airway clearance, consider pulm eval in am  pancx, Abx   discussed with family at bedside who said patient expressed wishes that she would not want to be intubated, confirm GOC   overall guarded prognosis
69 yo F PMH PCD c/b bronchiectasis w/ multiple admissions for bronchiectasis exacerbation, HTN, COVID infection 1/2022, Pseudomonas and ESBL klebsiella, chronic hypoxemic on 2-4LNC and hypercapneic respiratory failure on AVAPS referred from outpatient pulmonologist for worsening mental status and lethargy found to be severely hyponatremia. Palliative consulted for complex decision making regarding goc in setting of advanced illness.     Patient seen with palliative fellow, Dr. Marquez with her 2 daughters at bedside and son, on the phone. Patient with avaps in place and deferred discussion to her children. Discussed extensively the trajectory of bronchiectasis with patient's family and explored their goals for patient. Her children state they "want patient to feel better so she can live and go home". Explained the progressive nature of bronchiectasis from PCD, sharing that patient cannot be cured but teams can optimize her symptoms while having this chronic condition and eventually try to transition patient home with supportive services of hospice. Family struggling with the idea of patient's life expectancy being limited and perseverates on the CO2 numbers. They also share that patient "listens to the pulmonologist and prefers to have the AVAPs in place throughout the entire day, only briefly taking rest periods to eat and drink. Attempted to explain that if patient is dependent on AVAPs for majority of the day, our concern would be that she would have further complications of hypoglycemia from not eating and being dependent on the machine negatively impacts her quality of life by taking away her ability to communicate with her family. Introduced the concept of hospice philosophy of care as this would be the best service to honor patient's wishes to be at home with family and avoid recurrent hospitalizations. At this time, hospice philosophy of care is not in line with family's goc.   > Family was amenable to trialing low dose prn morphine solution for dyspnea/pain.   > Despite being on anxiolytic in the past to help with patient's air hunger anxiety, family does not want to risk patient being sleepy.   > Appreciate ongoing goc discussions with pulmonary team

## 2023-05-23 NOTE — PROGRESS NOTE ADULT - SUBJECTIVE AND OBJECTIVE BOX
Snow Blood, PGY1    GRACIE BHAKTA  68y  Female    Complaints:  Subjective:     No acute o/n events. Pt denies subj fevers/chills, HA, dizziness, chest pain, palpitations, n/v, abd pain, dysuria, diarrhea      FAMILY HISTORY:  Family history of diabetes mellitus  father    Family history of allergic rhinitis (Child, Child)  son, daughter    Family history of bronchitis  mother, father      68yVital Signs Last 24 Hrs  T(C): 36.3 (23 May 2023 05:00), Max: 36.5 (23 May 2023 01:00)  T(F): 97.3 (23 May 2023 05:00), Max: 97.7 (23 May 2023 01:00)  HR: 77 (23 May 2023 05:00) (62 - 89)  BP: 126/60 (23 May 2023 05:00) (126/60 - 140/58)  BP(mean): --  RR: 18 (23 May 2023 05:00) (18 - 18)  SpO2: 98% (23 May 2023 05:00) (96% - 100%)    Parameters below as of 23 May 2023 05:00  Patient On (Oxygen Delivery Method): AVAP          PHYSICAL EXAM:  GENERAL: NAD, well-groomed, well-developed  HEAD:  Atraumatic, Normocephalic  EYES: EOMI, PERRLA, conjunctiva and sclera clear  ENMT: No tonsillar erythema, exudates, or enlargement; Moist mucous membranes, Good dentition, No lesions  NECK: Supple, No JVD, Normal thyroid  NERVOUS SYSTEM:  Alert & Oriented X3, Good concentration; Motor Strength 5/5 B/L upper and lower extremities; DTRs 2+ intact and symmetric  CHEST/LUNG: Clear to percussion bilaterally; No rales, rhonchi, wheezing, or rubs  HEART: Regular rate and rhythm; No murmurs, rubs, or gallops  ABDOMEN: Soft, Nontender, Nondistended; Bowel sounds present  EXTREMITIES:  2+ Peripheral Pulses, No clubbing, cyanosis, or edema  LYMPH: No lymphadenopathy noted  SKIN: No rashes or lesions      Consultant(s) Notes Reviewed:  [x ] YES  [ ] NO  Care Discussed with Consultants/Other Providers [ x] YES  [ ] NO    LABS:                Female  RADIOLOGY & ADDITIONAL TESTS:    Imaging Personally Reviewed:  [ ] YES  [ ] NO    MedsMEDICATIONS  (STANDING):  acetylcysteine 10%  Inhalation 4 milliLiter(s) Inhalation daily  albuterol/ipratropium for Nebulization 3 milliLiter(s) Nebulizer every 6 hours  amLODIPine   Tablet 5 milliGRAM(s) Oral daily  budesonide 160 MICROgram(s)/formoterol 4.5 MICROgram(s) Inhaler 2 Puff(s) Inhalation two times a day  dextrose 5%. 1000 milliLiter(s) (100 mL/Hr) IV Continuous <Continuous>  dextrose 5%. 1000 milliLiter(s) (50 mL/Hr) IV Continuous <Continuous>  dextrose 50% Injectable 25 Gram(s) IV Push once  dextrose 50% Injectable 12.5 Gram(s) IV Push once  dextrose 50% Injectable 25 Gram(s) IV Push once  enoxaparin Injectable 30 milliGRAM(s) SubCutaneous every 24 hours  glucagon  Injectable 1 milliGRAM(s) IntraMuscular once  insulin glargine Injectable (LANTUS) 4 Unit(s) SubCutaneous at bedtime  insulin lispro (ADMELOG) corrective regimen sliding scale   SubCutaneous Before meals and at bedtime  insulin lispro Injectable (ADMELOG) 2 Unit(s) SubCutaneous three times a day before meals  ketotifen 0.025% Ophthalmic Solution 1 Drop(s) Both EYES two times a day  metoprolol succinate ER 50 milliGRAM(s) Oral daily  mirtazapine 7.5 milliGRAM(s) Oral at bedtime  nystatin    Suspension 051078 Unit(s) Oral four times a day  pantoprazole    Tablet 40 milliGRAM(s) Oral before breakfast  piperacillin/tazobactam IVPB.. 3.375 Gram(s) IV Intermittent every 8 hours  polyethylene glycol 3350 17 Gram(s) Oral daily  predniSONE   Tablet 20 milliGRAM(s) Oral daily  senna 2 Tablet(s) Oral at bedtime  simethicone 80 milliGRAM(s) Chew daily  sodium chloride 1 Gram(s) Oral three times a day  sodium chloride 3%  Inhalation 4 milliLiter(s) Inhalation every 12 hours    MEDICATIONS  (PRN):  bisacodyl 5 milliGRAM(s) Oral every 12 hours PRN Constipation  dextrose Oral Gel 15 Gram(s) Oral once PRN Blood Glucose LESS THAN 70 milliGRAM(s)/deciliter  fluticasone propionate 50 MICROgram(s)/spray Nasal Spray 1 Spray(s) Both Nostrils two times a day PRN nasal congestion      HEALTH ISSUES - PROBLEM Dx:  Acute hypercapnic respiratory failure    Metabolic encephalopathy    Hyponatremia    HTN (hypertension), benign    DM2 (diabetes mellitus, type 2)    Constipation    Hyperkalemia    Need for prophylactic measure                   Snow Blood, PGY1    GRACIE BHAKTA  68y  Female    Complaints:  Subjective:     No acute o/n events. Pt remained on AVAPS o/n. Pt AOx1. Able to state she is not in pain. Unable to obtain further ROS.       FAMILY HISTORY:  Family history of diabetes mellitus  father    Family history of allergic rhinitis (Child, Child)  son, daughter    Family history of bronchitis  mother, father      68yVital Signs Last 24 Hrs  T(C): 36.3 (23 May 2023 05:00), Max: 36.5 (23 May 2023 01:00)  T(F): 97.3 (23 May 2023 05:00), Max: 97.7 (23 May 2023 01:00)  HR: 77 (23 May 2023 05:00) (62 - 89)  BP: 126/60 (23 May 2023 05:00) (126/60 - 140/58)  BP(mean): --  RR: 18 (23 May 2023 05:00) (18 - 18)  SpO2: 98% (23 May 2023 05:00) (96% - 100%)    Parameters below as of 23 May 2023 05:00  Patient On (Oxygen Delivery Method): AVAP        PHYSICAL EXAM:  GENERAL: Increased work of breathing. Cachectic appearance.   HEAD:  Atraumatic, Normocephalic  NECK: Supple, No JVD,  NERVOUS SYSTEM:  Alert & Oriented X1, Pt follows basic commands, unable to participate in conversation  CHEST/LUNG: +wheezing/crackles w/ mechanical breath sounds. Exam limited to anterior fields   HEART: Regular rate and rhythm; No murmurs  ABDOMEN: Soft, Nontender, Nondistended; Bowel sounds present  EXTREMITIES:  2+ Peripheral Pulses, No clubbing, cyanosis, or edema  LYMPH: No lymphadenopathy noted  SKIN: No rashes or lesions      Consultant(s) Notes Reviewed:  [x ] YES  [ ] NO  Care Discussed with Consultants/Other Providers [ x] YES  [ ] NO    LABS:                          10.3   10.86 )-----------( 189      ( 23 May 2023 06:55 )             34.2       05-23    133<L>  |  91<L>  |  13  ----------------------------<  54<LL>  3.9   |  41<H>  |  0.32<L>    Ca    9.4      23 May 2023 06:55  Phos  2.6     05-23  Mg     1.90     05-23    TPro  6.3  /  Alb  3.2<L>  /  TBili  0.2  /  DBili  x   /  AST  19  /  ALT  19  /  AlkPhos  123<H>  05-23            CAPILLARY BLOOD GLUCOSE      POCT Blood Glucose.: 78 mg/dL (23 May 2023 08:00)        Female  RADIOLOGY & ADDITIONAL TESTS:    Imaging Personally Reviewed:  [ ] YES  [ ] NO    MedsMEDICATIONS  (STANDING):  acetylcysteine 10%  Inhalation 4 milliLiter(s) Inhalation daily  albuterol/ipratropium for Nebulization 3 milliLiter(s) Nebulizer every 6 hours  amLODIPine   Tablet 5 milliGRAM(s) Oral daily  budesonide 160 MICROgram(s)/formoterol 4.5 MICROgram(s) Inhaler 2 Puff(s) Inhalation two times a day  dextrose 5%. 1000 milliLiter(s) (100 mL/Hr) IV Continuous <Continuous>  dextrose 5%. 1000 milliLiter(s) (50 mL/Hr) IV Continuous <Continuous>  dextrose 50% Injectable 25 Gram(s) IV Push once  dextrose 50% Injectable 12.5 Gram(s) IV Push once  dextrose 50% Injectable 25 Gram(s) IV Push once  enoxaparin Injectable 30 milliGRAM(s) SubCutaneous every 24 hours  glucagon  Injectable 1 milliGRAM(s) IntraMuscular once  insulin glargine Injectable (LANTUS) 4 Unit(s) SubCutaneous at bedtime  insulin lispro (ADMELOG) corrective regimen sliding scale   SubCutaneous Before meals and at bedtime  insulin lispro Injectable (ADMELOG) 2 Unit(s) SubCutaneous three times a day before meals  ketotifen 0.025% Ophthalmic Solution 1 Drop(s) Both EYES two times a day  metoprolol succinate ER 50 milliGRAM(s) Oral daily  mirtazapine 7.5 milliGRAM(s) Oral at bedtime  nystatin    Suspension 155780 Unit(s) Oral four times a day  pantoprazole    Tablet 40 milliGRAM(s) Oral before breakfast  piperacillin/tazobactam IVPB.. 3.375 Gram(s) IV Intermittent every 8 hours  polyethylene glycol 3350 17 Gram(s) Oral daily  predniSONE   Tablet 20 milliGRAM(s) Oral daily  senna 2 Tablet(s) Oral at bedtime  simethicone 80 milliGRAM(s) Chew daily  sodium chloride 1 Gram(s) Oral three times a day  sodium chloride 3%  Inhalation 4 milliLiter(s) Inhalation every 12 hours    MEDICATIONS  (PRN):  bisacodyl 5 milliGRAM(s) Oral every 12 hours PRN Constipation  dextrose Oral Gel 15 Gram(s) Oral once PRN Blood Glucose LESS THAN 70 milliGRAM(s)/deciliter  fluticasone propionate 50 MICROgram(s)/spray Nasal Spray 1 Spray(s) Both Nostrils two times a day PRN nasal congestion      HEALTH ISSUES - PROBLEM Dx:  Acute hypercapnic respiratory failure    Metabolic encephalopathy    Hyponatremia    HTN (hypertension), benign    DM2 (diabetes mellitus, type 2)    Constipation    Hyperkalemia    Need for prophylactic measure

## 2023-05-23 NOTE — CONSULT NOTE ADULT - CONVERSATION DETAILS
Pts children met at bedside with family. Interval hospital course reviewed. Family shared with us that patient has been struggling with her condition for nearly 25 years and has a very strong will. The family and the patient remain treatment oriented in there approach and are reticent to de-escalate care. Home hospice was discussed, not agreeable to a referral at this time. Discussed option of alleviating symptoms with low dose morphine which may help maximize her time off of AVAPs, after extensive counseling and discussion they are agreeable to a trial.      Prior MOLST reviewed and is valid. Pt is DNR/DNI, with continued medical management. Pts children met at bedside with family. Interval hospital course reviewed. Family shared with us that patient has been struggling with her condition for nearly 25 years and has a very strong will. The family and the patient remain treatment oriented in there approach and are reticent to de-escalate care. Educated family on the philosophy of hospice care and explained the various settings and criteria in which hospice can be delivered (home vs. nursing home vs. inpatient hospice). Discussed the services provided under hospice services emphasizing the goal of elevating patient's quality of life and optimizing symptom management and avoiding unnecessary future hospitalizations. In addition to symptom management expertise, hospice provides supportive counseling services, nutritional support and chaplaincy services. After this explanation they are not ready for hospice services.     Discussed option of alleviating symptoms with low dose morphine which may help maximize her time off of AVAPs, after extensive counseling and discussion they are agreeable to a trial.      Prior MOLST reviewed and is valid. Pt is DNR/DNI, with continued medical management. Met patient's children at bedside with family. Introduced role of palliative care team. Interval hospital course reviewed. Family shared with us that patient has been struggling with her condition for nearly 25 years and has a very strong will to live. The family and the patient remain treatment oriented in there approach and are reticent to de-escalate care. Educated family on the philosophy of hospice care and explained the various settings and criteria in which hospice can be delivered (home vs. nursing home vs. inpatient hospice). Discussed the services provided under hospice services emphasizing the goal of elevating patient's quality of life and optimizing symptom management and avoiding unnecessary future hospitalizations. In addition to symptom management expertise, hospice provides supportive counseling services, nutritional support and chaplaincy services. After this explanation they are not ready for hospice services.     Discussed option of alleviating symptoms with low dose morphine which may help maximize her time off of AVAPs, after extensive counseling and discussion they are agreeable to a trial.      Prior MOLST reviewed and is valid. Pt is DNR/DNI, with continued medical management.

## 2023-05-23 NOTE — PROGRESS NOTE ADULT - ASSESSMENT
68F with hx of bronchiectasis iso primary ciliary diskinesia on home AVAPS and home o2 presents with acute on chronic hypoxemic hypercapneic respiratory failure in the setting of pseudomonas pna with cipro; undergoing extensive pulmonary toilet with minimal improvements; initiating prednisone 20 qd today 68F with hx of bronchiectasis iso primary ciliary diskinesia on home AVAPS and home o2 presents with acute on chronic hypoxemic hypercapneic respiratory failure in the setting of pseudomonas pna with cipro

## 2023-05-23 NOTE — PROGRESS NOTE ADULT - PROBLEM SELECTOR PLAN 3
suspect hypotonic hypovolemic hypoNa given improvement s/p IVF boluses    Plan:  BMP qdh (5/21 AM: 128-131)  c/w Salt Tabs 1g TID can wean if hypoNa improving w/ fluid.

## 2023-05-23 NOTE — PROGRESS NOTE ADULT - ATTENDING COMMENTS
Pt with ciliary dyskineisa and bronchiectasis  With respiratory failure   Pseudomonas growing in sputum  Concern for bronchiectasis exacerbation    Enterococcus in urine is likely a colonizer    Can give a 10 d course of antibiotics  Continue zosyn while admitted  can finish course with Levaquin 750 daily if stable for dc    Call ID service with questions

## 2023-05-23 NOTE — PROGRESS NOTE ADULT - PROBLEM SELECTOR PLAN 1
Pt w/ AHRF w/ hypercapnia iso acute on chronic bronchiectasis due to primary biliary dyskinesia w/ SpCx positive for pseudomonas. Pt on AVAPS at home.   - On meropenum and cipro per ID. F/u Cx sensitivities  - Pulm following: c/w q6 duonebs, q12 hypersal, mucomyst, and aerobika; chest PT and physical therapy  - On Avaps qhs and evening. On NC during the day.   - Pt w/ worsening clinical status despite AVAPS.   - Will obtain repeat VBG  - Pt is DNR/DNI, will consider palliative consult. Pt w/ AHRF w/ hypercapnia iso acute on chronic bronchiectasis due to primary biliary dyskinesia w/ SpCx positive for pseudomonas. Pt on AVAPS at home.   - s/p meropenum and cipro and transitioned to Zosyn for total 7 day abx course per ID.   - Pulm following: c/w q6 duonebs, q12 hypersal, mucomyst, and aerobika; chest PT and physical therapy  - On Avaps qhs and evening. On NC during the day.   - Pt w/ worsening clinical status despite AVAPS.   - Will obtain repeat VBG  - Pt is DNR/DNI, will consider palliative consult. Pt w/ AHRF w/ hypercapnia iso acute on chronic bronchiectasis due to primary biliary dyskinesia w/ SpCx positive for pseudomonas. Pt on AVAPS at home.   - s/p meropenum and cipro and transitioned to Zosyn for total 7 day abx course per ID.   - Pulm following: c/w q6 duonebs, q12 hypersal, mucomyst, and aerobika; chest PT and physical therapy  - On Avaps qhs and evening. On NC during the day.   - Pt w/ worsening clinical status despite AVAPS.   - Will obtain repeat VBG  - Pt is DNR/DNI, pending palliative consult.

## 2023-05-24 DIAGNOSIS — R53.81 OTHER MALAISE: ICD-10-CM

## 2023-05-24 LAB
ANION GAP SERPL CALC-SCNC: 7 MMOL/L — SIGNIFICANT CHANGE UP (ref 7–14)
BASE EXCESS BLDV CALC-SCNC: 19.4 MMOL/L — HIGH (ref -2–3)
BLOOD GAS VENOUS COMPREHENSIVE RESULT: SIGNIFICANT CHANGE UP
BUN SERPL-MCNC: 15 MG/DL — SIGNIFICANT CHANGE UP (ref 7–23)
CALCIUM SERPL-MCNC: 9.3 MG/DL — SIGNIFICANT CHANGE UP (ref 8.4–10.5)
CHLORIDE BLDV-SCNC: 92 MMOL/L — LOW (ref 96–108)
CHLORIDE SERPL-SCNC: 91 MMOL/L — LOW (ref 98–107)
CO2 BLDV-SCNC: 50.2 MMOL/L — HIGH (ref 22–26)
CO2 SERPL-SCNC: 39 MMOL/L — HIGH (ref 22–31)
CREAT SERPL-MCNC: 0.38 MG/DL — LOW (ref 0.5–1.3)
EGFR: 110 ML/MIN/1.73M2 — SIGNIFICANT CHANGE UP
GAS PNL BLDV: 134 MMOL/L — LOW (ref 136–145)
GLUCOSE BLDV-MCNC: 134 MG/DL — HIGH (ref 70–99)
GLUCOSE SERPL-MCNC: 137 MG/DL — HIGH (ref 70–99)
HCO3 BLDV-SCNC: 48 MMOL/L — CRITICAL HIGH (ref 22–29)
HCT VFR BLD CALC: 32.7 % — LOW (ref 34.5–45)
HCT VFR BLDA CALC: 32 % — LOW (ref 34.5–46.5)
HGB BLD CALC-MCNC: 10.5 G/DL — LOW (ref 11.7–16.1)
HGB BLD-MCNC: 9.9 G/DL — LOW (ref 11.5–15.5)
LACTATE BLDV-MCNC: 1 MMOL/L — SIGNIFICANT CHANGE UP (ref 0.5–2)
MAGNESIUM SERPL-MCNC: 2 MG/DL — SIGNIFICANT CHANGE UP (ref 1.6–2.6)
MCHC RBC-ENTMCNC: 26.1 PG — LOW (ref 27–34)
MCHC RBC-ENTMCNC: 30.3 GM/DL — LOW (ref 32–36)
MCV RBC AUTO: 86.1 FL — SIGNIFICANT CHANGE UP (ref 80–100)
NRBC # BLD: 0 /100 WBCS — SIGNIFICANT CHANGE UP (ref 0–0)
NRBC # FLD: 0 K/UL — SIGNIFICANT CHANGE UP (ref 0–0)
PCO2 BLDV: 79 MMHG — HIGH (ref 39–52)
PH BLDV: 7.39 — SIGNIFICANT CHANGE UP (ref 7.32–7.43)
PHOSPHATE SERPL-MCNC: 2.7 MG/DL — SIGNIFICANT CHANGE UP (ref 2.5–4.5)
PLATELET # BLD AUTO: 180 K/UL — SIGNIFICANT CHANGE UP (ref 150–400)
PO2 BLDV: 87 MMHG — HIGH (ref 25–45)
POTASSIUM BLDV-SCNC: 4.1 MMOL/L — SIGNIFICANT CHANGE UP (ref 3.5–5.1)
POTASSIUM SERPL-MCNC: 4.2 MMOL/L — SIGNIFICANT CHANGE UP (ref 3.5–5.3)
POTASSIUM SERPL-SCNC: 4.2 MMOL/L — SIGNIFICANT CHANGE UP (ref 3.5–5.3)
RBC # BLD: 3.8 M/UL — SIGNIFICANT CHANGE UP (ref 3.8–5.2)
RBC # FLD: 13.2 % — SIGNIFICANT CHANGE UP (ref 10.3–14.5)
SAO2 % BLDV: 98.5 % — HIGH (ref 67–88)
SODIUM SERPL-SCNC: 137 MMOL/L — SIGNIFICANT CHANGE UP (ref 135–145)
WBC # BLD: 8.57 K/UL — SIGNIFICANT CHANGE UP (ref 3.8–10.5)
WBC # FLD AUTO: 8.57 K/UL — SIGNIFICANT CHANGE UP (ref 3.8–10.5)

## 2023-05-24 PROCEDURE — 99232 SBSQ HOSP IP/OBS MODERATE 35: CPT | Mod: GC

## 2023-05-24 PROCEDURE — 99233 SBSQ HOSP IP/OBS HIGH 50: CPT | Mod: GC

## 2023-05-24 RX ORDER — INSULIN LISPRO 100/ML
1 VIAL (ML) SUBCUTANEOUS
Refills: 0 | Status: DISCONTINUED | OUTPATIENT
Start: 2023-05-24 | End: 2023-05-25

## 2023-05-24 RX ADMIN — SODIUM CHLORIDE 1 GRAM(S): 9 INJECTION INTRAMUSCULAR; INTRAVENOUS; SUBCUTANEOUS at 05:52

## 2023-05-24 RX ADMIN — ENOXAPARIN SODIUM 30 MILLIGRAM(S): 100 INJECTION SUBCUTANEOUS at 05:53

## 2023-05-24 RX ADMIN — PIPERACILLIN AND TAZOBACTAM 25 GRAM(S): 4; .5 INJECTION, POWDER, LYOPHILIZED, FOR SOLUTION INTRAVENOUS at 14:18

## 2023-05-24 RX ADMIN — AMLODIPINE BESYLATE 5 MILLIGRAM(S): 2.5 TABLET ORAL at 05:52

## 2023-05-24 RX ADMIN — Medication 5: at 22:59

## 2023-05-24 RX ADMIN — SIMETHICONE 80 MILLIGRAM(S): 80 TABLET, CHEWABLE ORAL at 12:54

## 2023-05-24 RX ADMIN — Medication 500000 UNIT(S): at 05:52

## 2023-05-24 RX ADMIN — Medication 1 UNIT(S): at 13:11

## 2023-05-24 RX ADMIN — Medication 500000 UNIT(S): at 23:43

## 2023-05-24 RX ADMIN — BUDESONIDE AND FORMOTEROL FUMARATE DIHYDRATE 2 PUFF(S): 160; 4.5 AEROSOL RESPIRATORY (INHALATION) at 08:51

## 2023-05-24 RX ADMIN — PANTOPRAZOLE SODIUM 40 MILLIGRAM(S): 20 TABLET, DELAYED RELEASE ORAL at 05:53

## 2023-05-24 RX ADMIN — Medication 2: at 13:12

## 2023-05-24 RX ADMIN — Medication 3 MILLILITER(S): at 21:57

## 2023-05-24 RX ADMIN — POLYETHYLENE GLYCOL 3350 17 GRAM(S): 17 POWDER, FOR SOLUTION ORAL at 12:54

## 2023-05-24 RX ADMIN — SENNA PLUS 2 TABLET(S): 8.6 TABLET ORAL at 22:58

## 2023-05-24 RX ADMIN — KETOTIFEN FUMARATE 1 DROP(S): 0.34 SOLUTION OPHTHALMIC at 05:53

## 2023-05-24 RX ADMIN — Medication 500000 UNIT(S): at 17:18

## 2023-05-24 RX ADMIN — SODIUM CHLORIDE 4 MILLILITER(S): 9 INJECTION INTRAMUSCULAR; INTRAVENOUS; SUBCUTANEOUS at 09:54

## 2023-05-24 RX ADMIN — Medication 4: at 17:16

## 2023-05-24 RX ADMIN — SODIUM CHLORIDE 1 GRAM(S): 9 INJECTION INTRAMUSCULAR; INTRAVENOUS; SUBCUTANEOUS at 22:58

## 2023-05-24 RX ADMIN — BUDESONIDE AND FORMOTEROL FUMARATE DIHYDRATE 2 PUFF(S): 160; 4.5 AEROSOL RESPIRATORY (INHALATION) at 23:16

## 2023-05-24 RX ADMIN — PIPERACILLIN AND TAZOBACTAM 25 GRAM(S): 4; .5 INJECTION, POWDER, LYOPHILIZED, FOR SOLUTION INTRAVENOUS at 05:46

## 2023-05-24 RX ADMIN — PIPERACILLIN AND TAZOBACTAM 25 GRAM(S): 4; .5 INJECTION, POWDER, LYOPHILIZED, FOR SOLUTION INTRAVENOUS at 22:58

## 2023-05-24 RX ADMIN — Medication 20 MILLIGRAM(S): at 05:52

## 2023-05-24 RX ADMIN — Medication 4 MILLILITER(S): at 09:53

## 2023-05-24 RX ADMIN — SODIUM CHLORIDE 4 MILLILITER(S): 9 INJECTION INTRAMUSCULAR; INTRAVENOUS; SUBCUTANEOUS at 21:57

## 2023-05-24 RX ADMIN — MIRTAZAPINE 7.5 MILLIGRAM(S): 45 TABLET, ORALLY DISINTEGRATING ORAL at 22:58

## 2023-05-24 RX ADMIN — Medication 500000 UNIT(S): at 12:54

## 2023-05-24 RX ADMIN — KETOTIFEN FUMARATE 1 DROP(S): 0.34 SOLUTION OPHTHALMIC at 17:16

## 2023-05-24 RX ADMIN — Medication 50 MILLIGRAM(S): at 05:52

## 2023-05-24 RX ADMIN — Medication 3 MILLILITER(S): at 09:53

## 2023-05-24 RX ADMIN — SODIUM CHLORIDE 1 GRAM(S): 9 INJECTION INTRAMUSCULAR; INTRAVENOUS; SUBCUTANEOUS at 13:13

## 2023-05-24 RX ADMIN — Medication 3 MILLILITER(S): at 03:53

## 2023-05-24 RX ADMIN — Medication 1: at 08:51

## 2023-05-24 NOTE — PROGRESS NOTE ADULT - ATTENDING COMMENTS
67 yo F PMH PCD c/b bronchiectasis w/ multiple admissions for bronchiectasis exacerbation, HTN, COVID infection 1/2022, Pseudomonas and ESBL klebsiella, chronic hypoxemic on 2-4LNC and hypercapneic respiratory failure on AVAPS referred from outpatient pulmonologist for worsening mental status and lethargy found to be severely hyponatremia. Palliative consulted for complex decision making regarding goc in setting of advanced illness.     Patient did not receive PRN morphine. She continues to be AVAPs and nasal cannula throughout the day.   > Family was amenable to trialing low dose prn morphine solution for dyspnea/pain.   > Despite being on anxiolytic in the past to help with patient's air hunger anxiety, family does not want to risk patient being sleepy.   > Family not ready for hospice transition. Would encourage ongoing goc discussions with primary and pulmonary team.

## 2023-05-24 NOTE — PROGRESS NOTE ADULT - PROBLEM SELECTOR PLAN 4
Goals of care are clear, continue with current management.  Palliative Care to sign off. Please reconsult as needed.    En Marquez MD  Palliative Medicine Fellow    In the event of newly developing, evolving, or worsening symptoms, please contact the Palliative Medicine team via pager (if the patient is at [facility] #[phone] or if the patient is at [facility] #[phone]) The Geriatric and Palliative Medicine service has coverage 24 hours a day/ 7 days a week to provide medical recommendations regarding symptom management needs via telephone.

## 2023-05-24 NOTE — PROGRESS NOTE ADULT - PROBLEM SELECTOR PLAN 2
Patient with acute metabolic encephalopathy likely iso hypercapnia and sepsis 2/2 psuedomonas pneumonia.   - Will complete 7 day abx course w/ ABx although unlikely infectious at this time  - Will continue w/ AVAPS qhs   - Monitor VBG. CO2 stable ~80 w/ metabolic compensation at this time.

## 2023-05-24 NOTE — PROGRESS NOTE ADULT - PROBLEM SELECTOR PLAN 1
Pt w/ AHRF w/ hypercapnia iso acute on chronic bronchiectasis due to primary biliary dyskinesia w/ SpCx positive for pseudomonas. Pt on AVAPS at home.   - s/p meropenum and cipro and transitioned to Zosyn for total 7 day abx course per ID.   - Pulm following: c/w q6 duonebs, q12 hypersal, mucomyst, and aerobika; chest PT and physical therapy  - On Avaps qhs and evening. On NC during the day.   - Pt w/ worsening clinical status despite AVAPS.   - VBG w/ compensated hypercarbia stable w/ CO2 ~80.   - Pt is DNR/DNI  - Palliative consulted w/ ongoing discussions for possible hospice care Pt w/ AHRF w/ hypercapnia iso acute on chronic bronchiectasis due to primary biliary dyskinesia w/ SpCx positive for pseudomonas. Pt on AVAPS at home.   - s/p meropenum and cipro and transitioned to Zosyn for total 7 day abx course per ID.   - Pulm following: c/w q6 duonebs, q12 hypersal, mucomyst, and aerobika; chest PT and physical therapy  - On prednisone 20mg. F/u pulm for duration of course  - On Avaps qhs and evening. On NC during the day.   - VBG w/ compensated hypercarbia stable w/ CO2 ~80.   - Pt is DNR/DNI  - Palliative consulted w/ ongoing discussions for possible hospice care

## 2023-05-24 NOTE — PROGRESS NOTE ADULT - SUBJECTIVE AND OBJECTIVE BOX
Snow Blood, PGY1    GRACIE BHAKTA  68y  Female    Complaints:  Subjective:     No acute o/n events. Pt denies subj fevers/chills, HA, dizziness, chest pain, palpitations, n/v, abd pain, dysuria, diarrhea      FAMILY HISTORY:  Family history of diabetes mellitus  father    Family history of allergic rhinitis (Child, Child)  son, daughter    Family history of bronchitis  mother, father      68yVital Signs Last 24 Hrs  T(C): 36.5 (24 May 2023 05:50), Max: 36.7 (23 May 2023 09:00)  T(F): 97.7 (24 May 2023 05:50), Max: 98.1 (23 May 2023 09:00)  HR: 78 (24 May 2023 05:50) (67 - 93)  BP: 125/70 (24 May 2023 05:50) (124/68 - 158/78)  BP(mean): --  RR: 20 (24 May 2023 05:50) (18 - 20)  SpO2: 100% (24 May 2023 05:50) (94% - 100%)    Parameters below as of 24 May 2023 05:50  Patient On (Oxygen Delivery Method): AVAP          PHYSICAL EXAM:  GENERAL: NAD, well-groomed, well-developed  HEAD:  Atraumatic, Normocephalic  EYES: EOMI, PERRLA, conjunctiva and sclera clear  ENMT: No tonsillar erythema, exudates, or enlargement; Moist mucous membranes, Good dentition, No lesions  NECK: Supple, No JVD, Normal thyroid  NERVOUS SYSTEM:  Alert & Oriented X3, Good concentration; Motor Strength 5/5 B/L upper and lower extremities; DTRs 2+ intact and symmetric  CHEST/LUNG: Clear to percussion bilaterally; No rales, rhonchi, wheezing, or rubs  HEART: Regular rate and rhythm; No murmurs, rubs, or gallops  ABDOMEN: Soft, Nontender, Nondistended; Bowel sounds present  EXTREMITIES:  2+ Peripheral Pulses, No clubbing, cyanosis, or edema  LYMPH: No lymphadenopathy noted  SKIN: No rashes or lesions      Consultant(s) Notes Reviewed:  [x ] YES  [ ] NO  Care Discussed with Consultants/Other Providers [ x] YES  [ ] NO    LABS:                Female  RADIOLOGY & ADDITIONAL TESTS:    Imaging Personally Reviewed:  [ ] YES  [ ] NO    MedsMEDICATIONS  (STANDING):  acetylcysteine 10%  Inhalation 4 milliLiter(s) Inhalation daily  albuterol/ipratropium for Nebulization 3 milliLiter(s) Nebulizer every 6 hours  amLODIPine   Tablet 5 milliGRAM(s) Oral daily  budesonide 160 MICROgram(s)/formoterol 4.5 MICROgram(s) Inhaler 2 Puff(s) Inhalation two times a day  dextrose 5%. 1000 milliLiter(s) (100 mL/Hr) IV Continuous <Continuous>  dextrose 5%. 1000 milliLiter(s) (50 mL/Hr) IV Continuous <Continuous>  dextrose 50% Injectable 25 Gram(s) IV Push once  dextrose 50% Injectable 12.5 Gram(s) IV Push once  dextrose 50% Injectable 25 Gram(s) IV Push once  enoxaparin Injectable 30 milliGRAM(s) SubCutaneous every 24 hours  glucagon  Injectable 1 milliGRAM(s) IntraMuscular once  insulin glargine Injectable (LANTUS) 4 Unit(s) SubCutaneous at bedtime  insulin lispro (ADMELOG) corrective regimen sliding scale   SubCutaneous Before meals and at bedtime  ketotifen 0.025% Ophthalmic Solution 1 Drop(s) Both EYES two times a day  metoprolol succinate ER 50 milliGRAM(s) Oral daily  mirtazapine 7.5 milliGRAM(s) Oral at bedtime  nystatin    Suspension 902862 Unit(s) Oral four times a day  pantoprazole    Tablet 40 milliGRAM(s) Oral before breakfast  piperacillin/tazobactam IVPB.. 3.375 Gram(s) IV Intermittent every 8 hours  polyethylene glycol 3350 17 Gram(s) Oral daily  predniSONE   Tablet 20 milliGRAM(s) Oral daily  senna 2 Tablet(s) Oral at bedtime  simethicone 80 milliGRAM(s) Chew daily  sodium chloride 1 Gram(s) Oral three times a day  sodium chloride 3%  Inhalation 4 milliLiter(s) Inhalation every 12 hours    MEDICATIONS  (PRN):  bisacodyl 5 milliGRAM(s) Oral every 12 hours PRN Constipation  dextrose Oral Gel 15 Gram(s) Oral once PRN Blood Glucose LESS THAN 70 milliGRAM(s)/deciliter  fluticasone propionate 50 MICROgram(s)/spray Nasal Spray 1 Spray(s) Both Nostrils two times a day PRN nasal congestion      HEALTH ISSUES - PROBLEM Dx:  Acute hypercapnic respiratory failure    Metabolic encephalopathy    Hyponatremia    HTN (hypertension), benign    DM2 (diabetes mellitus, type 2)    Constipation    Hyperkalemia    Need for prophylactic measure    Functional quadriplegia    Advanced care planning/counseling discussion    Encounter for palliative care                   Snow Blood, PGY1    GRACIE BHAKTA  68y  Female    Complaints:  Subjective:     No acute o/n events. Pt used AVAPs mask all night. Pt preferred son to translate. AOx3 on examination. Reports shortness of breath but otherwise denies, HA, dizziness, chest pain, palpitations, n/v, abd pain.     FAMILY HISTORY:  Family history of diabetes mellitus  father    Family history of allergic rhinitis (Child, Child)  son, daughter    Family history of bronchitis  mother, father      68yVital Signs Last 24 Hrs  T(C): 36.5 (24 May 2023 05:50), Max: 36.7 (23 May 2023 09:00)  T(F): 97.7 (24 May 2023 05:50), Max: 98.1 (23 May 2023 09:00)  HR: 78 (24 May 2023 05:50) (67 - 93)  BP: 125/70 (24 May 2023 05:50) (124/68 - 158/78)  BP(mean): --  RR: 20 (24 May 2023 05:50) (18 - 20)  SpO2: 100% (24 May 2023 05:50) (94% - 100%)    Parameters below as of 24 May 2023 05:50  Patient On (Oxygen Delivery Method): AVAP        PHYSICAL EXAM:  GENERAL: Increased work of breathing. Cachectic appearance.   HEAD:  Atraumatic, Normocephalic  NECK: Supple, No JVD,  NERVOUS SYSTEM:  Alert & Oriented X3, Pt follows basic commands, unable to participate in conversation  CHEST/LUNG: +wheezing/rhonchi   HEART: Regular rate and rhythm; No murmurs  ABDOMEN: Soft, Nontender, Nondistended; Bowel sounds present  EXTREMITIES:  2+ Peripheral Pulses, No clubbing, cyanosis, or edema  LYMPH: No lymphadenopathy noted  SKIN: No rashes or lesions      Consultant(s) Notes Reviewed:  [x ] YES  [ ] NO  Care Discussed with Consultants/Other Providers [ x] YES  [ ] NO    LABS:                        9.9    8.57  )-----------( 180      ( 24 May 2023 05:15 )             32.7       05-24    137  |  91<L>  |  15  ----------------------------<  137<H>  4.2   |  39<H>  |  0.38<L>    Ca    9.3      24 May 2023 05:15  Phos  2.7     05-24  Mg     2.00     05-24    TPro  6.3  /  Alb  3.2<L>  /  TBili  0.2  /  DBili  x   /  AST  19  /  ALT  19  /  AlkPhos  123<H>  05-23            CAPILLARY BLOOD GLUCOSE      POCT Blood Glucose.: 159 mg/dL (24 May 2023 08:28)        Female  RADIOLOGY & ADDITIONAL TESTS:    Imaging Personally Reviewed:  [ ] YES  [ ] NO    MedsMEDICATIONS  (STANDING):  acetylcysteine 10%  Inhalation 4 milliLiter(s) Inhalation daily  albuterol/ipratropium for Nebulization 3 milliLiter(s) Nebulizer every 6 hours  amLODIPine   Tablet 5 milliGRAM(s) Oral daily  budesonide 160 MICROgram(s)/formoterol 4.5 MICROgram(s) Inhaler 2 Puff(s) Inhalation two times a day  dextrose 5%. 1000 milliLiter(s) (100 mL/Hr) IV Continuous <Continuous>  dextrose 5%. 1000 milliLiter(s) (50 mL/Hr) IV Continuous <Continuous>  dextrose 50% Injectable 25 Gram(s) IV Push once  dextrose 50% Injectable 12.5 Gram(s) IV Push once  dextrose 50% Injectable 25 Gram(s) IV Push once  enoxaparin Injectable 30 milliGRAM(s) SubCutaneous every 24 hours  glucagon  Injectable 1 milliGRAM(s) IntraMuscular once  insulin glargine Injectable (LANTUS) 4 Unit(s) SubCutaneous at bedtime  insulin lispro (ADMELOG) corrective regimen sliding scale   SubCutaneous Before meals and at bedtime  ketotifen 0.025% Ophthalmic Solution 1 Drop(s) Both EYES two times a day  metoprolol succinate ER 50 milliGRAM(s) Oral daily  mirtazapine 7.5 milliGRAM(s) Oral at bedtime  nystatin    Suspension 614597 Unit(s) Oral four times a day  pantoprazole    Tablet 40 milliGRAM(s) Oral before breakfast  piperacillin/tazobactam IVPB.. 3.375 Gram(s) IV Intermittent every 8 hours  polyethylene glycol 3350 17 Gram(s) Oral daily  predniSONE   Tablet 20 milliGRAM(s) Oral daily  senna 2 Tablet(s) Oral at bedtime  simethicone 80 milliGRAM(s) Chew daily  sodium chloride 1 Gram(s) Oral three times a day  sodium chloride 3%  Inhalation 4 milliLiter(s) Inhalation every 12 hours    MEDICATIONS  (PRN):  bisacodyl 5 milliGRAM(s) Oral every 12 hours PRN Constipation  dextrose Oral Gel 15 Gram(s) Oral once PRN Blood Glucose LESS THAN 70 milliGRAM(s)/deciliter  fluticasone propionate 50 MICROgram(s)/spray Nasal Spray 1 Spray(s) Both Nostrils two times a day PRN nasal congestion      HEALTH ISSUES - PROBLEM Dx:  Acute hypercapnic respiratory failure    Metabolic encephalopathy    Hyponatremia    HTN (hypertension), benign    DM2 (diabetes mellitus, type 2)    Constipation    Hyperkalemia    Need for prophylactic measure    Functional quadriplegia    Advanced care planning/counseling discussion    Encounter for palliative care

## 2023-05-24 NOTE — PROGRESS NOTE ADULT - ASSESSMENT
68F with hx of bronchiectasis iso primary ciliary diskinesia on home AVAPS and home o2 presents with acute on chronic hypoxemic hypercapneic respiratory failure in the setting of pseudomonas pna with cipro

## 2023-05-24 NOTE — PROGRESS NOTE ADULT - ATTENDING COMMENTS
68F Primary Ciliary Dyskinesia with bronchiectasis – Home O2/AVAPS, HTN, p/w acute on chronic hypoxic hypercarbic respiratory failure from Pseudomonas PNA c/b acute encephalopathy, Hyponatremia, steroid induced DM2.  - Will observe with AVAPS on at night and PRN during the daytime  - Monitor AVAPS setting with serial ABGs  - will arrange for new AVAPS setting for home  - poor prognosis due to advancing pulmonary dysfunction  - Appreciate Palliative care consult - not ready for hospice placement or comfort care.  - Meropenem IV, Cipro PO to Zosyn IV until 5/25 - appreciate ID recs  - Hyperglycemia and hypoglycemia - labile FS; pre-meal admelog.  - Dispo: anticipate d/c to home  - discussed with family by the bedside for 15minutes on 5/24. Answered all the questions.

## 2023-05-24 NOTE — PROGRESS NOTE ADULT - PROBLEM SELECTOR PLAN 1
Secondary to known chronic bronchiectasis, prior to admission was on AVAPs qHS at home, now increasingly dependent on it.  -antibiotics per cultures and sensitivities   -on steroids  -management as per pulm   -discussed trial of low dose morphine solution to assist with symptoms, would recommend Morphine solution 3 mg q6hr PRN with holding parameters for sedation and RR <10.

## 2023-05-24 NOTE — PROGRESS NOTE ADULT - PROBLEM SELECTOR PLAN 5
Pt on prednisone 20mg w/ hyperglycemia on hospital labs.   - Will increase Lantus from 2U to 7U  - Add mealtime admelog 2U TID  - c/w ISS  - c/t monitor glucose.

## 2023-05-24 NOTE — PROGRESS NOTE ADULT - SUBJECTIVE AND OBJECTIVE BOX
SUBJECTIVE AND OBJECTIVE  Indication for Geriatrics and Palliative Care Services/INTERVAL HPI: goals of care in the setting of bronchiectasis.    OVERNIGHT EVENTS: No acute events overnight, patient seen and examined. No distress noted, on nasal canula, however appears tachypneic, using accessory muscles.     DNR on chart:Yes  Yes      Allergies    No Known Allergies    Intolerances    MEDICATIONS  (STANDING):  acetylcysteine 10%  Inhalation 4 milliLiter(s) Inhalation daily  albuterol/ipratropium for Nebulization 3 milliLiter(s) Nebulizer every 6 hours  amLODIPine   Tablet 5 milliGRAM(s) Oral daily  budesonide 160 MICROgram(s)/formoterol 4.5 MICROgram(s) Inhaler 2 Puff(s) Inhalation two times a day  dextrose 5%. 1000 milliLiter(s) (100 mL/Hr) IV Continuous <Continuous>  dextrose 5%. 1000 milliLiter(s) (50 mL/Hr) IV Continuous <Continuous>  dextrose 50% Injectable 25 Gram(s) IV Push once  dextrose 50% Injectable 12.5 Gram(s) IV Push once  dextrose 50% Injectable 25 Gram(s) IV Push once  enoxaparin Injectable 30 milliGRAM(s) SubCutaneous every 24 hours  glucagon  Injectable 1 milliGRAM(s) IntraMuscular once  insulin lispro (ADMELOG) corrective regimen sliding scale   SubCutaneous Before meals and at bedtime  insulin lispro Injectable (ADMELOG) 1 Unit(s) SubCutaneous three times a day before meals  ketotifen 0.025% Ophthalmic Solution 1 Drop(s) Both EYES two times a day  metoprolol succinate ER 50 milliGRAM(s) Oral daily  mirtazapine 7.5 milliGRAM(s) Oral at bedtime  nystatin    Suspension 468636 Unit(s) Oral four times a day  pantoprazole    Tablet 40 milliGRAM(s) Oral before breakfast  piperacillin/tazobactam IVPB.. 3.375 Gram(s) IV Intermittent every 8 hours  polyethylene glycol 3350 17 Gram(s) Oral daily  predniSONE   Tablet 20 milliGRAM(s) Oral daily  senna 2 Tablet(s) Oral at bedtime  simethicone 80 milliGRAM(s) Chew daily  sodium chloride 1 Gram(s) Oral three times a day  sodium chloride 3%  Inhalation 4 milliLiter(s) Inhalation every 12 hours    MEDICATIONS  (PRN):  bisacodyl 5 milliGRAM(s) Oral every 12 hours PRN Constipation  dextrose Oral Gel 15 Gram(s) Oral once PRN Blood Glucose LESS THAN 70 milliGRAM(s)/deciliter  fluticasone propionate 50 MICROgram(s)/spray Nasal Spray 1 Spray(s) Both Nostrils two times a day PRN nasal congestion      ITEMS UNCHECKED ARE NOT PRESENT    PRESENT SYMPTOMS: [ ]Unable to self-report - see [ ] CPOT [ ] PAINADS [ ] RDOS  Source if other than patient:  [ ]Family   [ ]Team     Pain:  [ ]yes [x]no  QOL impact -   Location -                    Aggravating factors -  Quality -  Radiation -  Timing-  Severity (0-10 scale):  Minimal acceptable level (0-10 scale):     Dyspnea:                           [ ]Mild [ ]Moderate [ ]Severe  Anxiety:                             [ ]Mild [ ]Moderate [ ]Severe  Fatigue:                             [ ]Mild [ ]Moderate [ ]Severe  Nausea:                             [ ]Mild [ ]Moderate [ ]Severe  Loss of appetite:              [ ]Mild [ ]Moderate [ ]Severe  Constipation:                    [ ]Mild [ ]Moderate [ ]Severe    PCSSQ[Palliative Care Spiritual Screening Question]   Severity (0-10):  Score of 4 or > indicate consideration of Chaplaincy referral.  Chaplaincy Referral: [ ] yes [ ] refused [ ] following [x] Deferred     Caregiver [place]? : [ ] yes [ ] no [x] Deferred [ ] Declined             Social work referral [ ] Patient & Family Centered Care Referral [ ]     Anticipatory Grief present?:  [ ] yes [ ] no  [x] Deferred                  Social work referral [ ] Chaplaincy Referral[ ]    Other Symptoms:  [x]All other review of systems negative     Palliative Performance Status Version 2:        40%      http://npcrc.org/files/news/palliative_performance_scale_ppsv2.pdf    PHYSICAL EXAM:  Vital Signs Last 24 Hrs  T(C): 36.6 (24 May 2023 10:00), Max: 36.6 (23 May 2023 17:00)  T(F): 97.9 (24 May 2023 10:00), Max: 97.9 (24 May 2023 10:00)  HR: 77 (24 May 2023 11:27) (74 - 98)  BP: 163/85 (24 May 2023 10:00) (124/68 - 163/85)  BP(mean): --  RR: 20 (24 May 2023 10:00) (20 - 20)  SpO2: 96% (24 May 2023 11:27) (96% - 100%)    Parameters below as of 24 May 2023 10:00  Patient On (Oxygen Delivery Method): nasal cannula  O2 Flow (L/min): 3   I&O's Summary    23 May 2023 07:01  -  24 May 2023 07:00  --------------------------------------------------------  IN: 0 mL / OUT: 1700 mL / NET: -1700 mL         GENERAL: [ ]Cachexia    [ ]Alert  [ ]Oriented x   [x]Lethargic  [ ]Unarousable  [ ]Verbal  [ ]Non-Verbal  Behavioral:   [ ] Anxiety  [ ] Delirium [ ] Agitation [ ] Other  HEENT:  [ ]Normal   [x]Dry mouth   [ ]ET Tube/Trach  [ ]Oral lesions  PULMONARY:   [ ]Clear [ ]Tachypnea  [ ]Audible excessive secretions   [ ]Rhonchi        [ ]Right [ ]Left [ ]Bilateral  [ ]Crackles        [ ]Right [ ]Left [ ]Bilateral  [ ]Wheezing     [ ]Right [ ]Left [ ]Bilateral  [x]Diminished breath sounds [ ]right [ ]left [x]bilateral  CARDIOVASCULAR:    [x]Regular [ ]Irregular [ ]Tachy  [ ]Fabio [ ]Murmur [ ]Other  GASTROINTESTINAL:  [x]Soft  [ ]Distended   [ ]+BS  [x]Non tender [ ]Tender  [ ]Other [ ]PEG [ ]OGT/ NGT  Last BM: 5/21  GENITOURINARY:  [x]Normal [ ] Incontinent   [ ]Oliguria/Anuria   [ ]Rosenberg  MUSCULOSKELETAL:   [ ]Normal   [x]Weakness  [ ]Bed/Wheelchair bound [ ]Edema  NEUROLOGIC:   [x]No focal deficits  [ ]Cognitive impairment  [ ]Dysphagia [ ]Dysarthria [ ]Paresis [ ]Other   SKIN:   [ ]Normal  [ ]Rash  [ ]Other  [ ]Pressure ulcer(s)       Present on admission [ ]y [ ]n    CRITICAL CARE:  [ ]Shock Present  [ ]Septic [ ]Cardiogenic [ ]Neurologic [ ]Hypovolemic  [ ]Vasopressors [ ]Inotropes  [ ]Respiratory failure present [ ]Mechanical Ventilation [ ]Non-invasive ventilatory support [ ]High-Flow Mode: standby  [ ]Acute  [ ]Chronic [ ]Hypoxic  [ ]Hypercarbic [ ]Other  [ ]Other organ failure     LABS:                        9.9    8.57  )-----------( 180      ( 24 May 2023 05:15 )             32.7   05-24    137  |  91<L>  |  15  ----------------------------<  137<H>  4.2   |  39<H>  |  0.38<L>    Ca    9.3      24 May 2023 05:15  Phos  2.7     05-24  Mg     2.00     05-24    TPro  6.3  /  Alb  3.2<L>  /  TBili  0.2  /  DBili  x   /  AST  19  /  ALT  19  /  AlkPhos  123<H>  05-23    RADIOLOGY & ADDITIONAL STUDIES: No new imaging.     Protein Calorie Malnutrition Present: [ ]mild [ ]moderate [ ]severe [ ]underweight [ ]morbid obesity  https://www.andeal.org/vault/2440/web/files/ONC/Table_Clinical%20Characteristics%20to%20Document%20Malnutrition-White%20JV%20et%20al%202012.pdf    Height (cm): 165.1 (05-18-23 @ 07:29), 157.5 (03-30-23 @ 07:39), 162.6 (09-28-22 @ 17:15)  Weight (kg): 43.6 (05-18-23 @ 07:29), 39.5 (03-30-23 @ 07:39), 45.9 (09-28-22 @ 17:15)  BMI (kg/m2): 16 (05-18-23 @ 07:29), 15.9 (03-30-23 @ 07:39), 17.4 (09-28-22 @ 17:15)    [ ]PPSV2 < or = 30%  [ ]significant weight loss [ ]poor nutritional intake [ ]anasarca[ ]Artificial Nutrition    Other REFERRALS:  [ ]Hospice  [ ]Child Life  [ ]Social Work  [ ]Case management [ ]Holistic Therapy

## 2023-05-24 NOTE — PROGRESS NOTE ADULT - PROBLEM SELECTOR PLAN 8
Diet: CC/DASH no beef/pork  Psych/Sleep: None FRANCES  GI: Protonix  DVT ppx: Lovenox 40mg   Code Status: Pending further discussion and palliative consult  Dispo: Pending resolution of hypercapnia.

## 2023-05-25 DIAGNOSIS — A41.9 SEPSIS, UNSPECIFIED ORGANISM: ICD-10-CM

## 2023-05-25 LAB
ANION GAP SERPL CALC-SCNC: 3 MMOL/L — LOW (ref 7–14)
BASE EXCESS BLDV CALC-SCNC: 18.6 MMOL/L — HIGH (ref -2–3)
BLOOD GAS VENOUS COMPREHENSIVE RESULT: SIGNIFICANT CHANGE UP
BUN SERPL-MCNC: 18 MG/DL — SIGNIFICANT CHANGE UP (ref 7–23)
CALCIUM SERPL-MCNC: 9.4 MG/DL — SIGNIFICANT CHANGE UP (ref 8.4–10.5)
CHLORIDE BLDV-SCNC: 95 MMOL/L — LOW (ref 96–108)
CHLORIDE SERPL-SCNC: 94 MMOL/L — LOW (ref 98–107)
CO2 BLDV-SCNC: 49 MMOL/L — HIGH (ref 22–26)
CO2 SERPL-SCNC: 41 MMOL/L — HIGH (ref 22–31)
CREAT SERPL-MCNC: 0.43 MG/DL — LOW (ref 0.5–1.3)
EGFR: 107 ML/MIN/1.73M2 — SIGNIFICANT CHANGE UP
GAS PNL BLDV: 136 MMOL/L — SIGNIFICANT CHANGE UP (ref 136–145)
GLUCOSE BLDV-MCNC: 123 MG/DL — HIGH (ref 70–99)
GLUCOSE SERPL-MCNC: 124 MG/DL — HIGH (ref 70–99)
HCO3 BLDV-SCNC: 47 MMOL/L — CRITICAL HIGH (ref 22–29)
HCT VFR BLD CALC: 32.3 % — LOW (ref 34.5–45)
HCT VFR BLDA CALC: 29 % — LOW (ref 34.5–46.5)
HGB BLD CALC-MCNC: 9.8 G/DL — LOW (ref 11.7–16.1)
HGB BLD-MCNC: 9.7 G/DL — LOW (ref 11.5–15.5)
LACTATE BLDV-MCNC: 0.8 MMOL/L — SIGNIFICANT CHANGE UP (ref 0.5–2)
MAGNESIUM SERPL-MCNC: 2 MG/DL — SIGNIFICANT CHANGE UP (ref 1.6–2.6)
MCHC RBC-ENTMCNC: 26.3 PG — LOW (ref 27–34)
MCHC RBC-ENTMCNC: 30 GM/DL — LOW (ref 32–36)
MCV RBC AUTO: 87.5 FL — SIGNIFICANT CHANGE UP (ref 80–100)
NRBC # BLD: 0 /100 WBCS — SIGNIFICANT CHANGE UP (ref 0–0)
NRBC # FLD: 0 K/UL — SIGNIFICANT CHANGE UP (ref 0–0)
PCO2 BLDV: 77 MMHG — HIGH (ref 39–52)
PH BLDV: 7.39 — SIGNIFICANT CHANGE UP (ref 7.32–7.43)
PHOSPHATE SERPL-MCNC: 3.5 MG/DL — SIGNIFICANT CHANGE UP (ref 2.5–4.5)
PLATELET # BLD AUTO: 167 K/UL — SIGNIFICANT CHANGE UP (ref 150–400)
PO2 BLDV: 127 MMHG — HIGH (ref 25–45)
POTASSIUM BLDV-SCNC: 3.9 MMOL/L — SIGNIFICANT CHANGE UP (ref 3.5–5.1)
POTASSIUM SERPL-MCNC: 3.8 MMOL/L — SIGNIFICANT CHANGE UP (ref 3.5–5.3)
POTASSIUM SERPL-SCNC: 3.8 MMOL/L — SIGNIFICANT CHANGE UP (ref 3.5–5.3)
RBC # BLD: 3.69 M/UL — LOW (ref 3.8–5.2)
RBC # FLD: 13.2 % — SIGNIFICANT CHANGE UP (ref 10.3–14.5)
SAO2 % BLDV: 98.9 % — HIGH (ref 67–88)
SODIUM SERPL-SCNC: 138 MMOL/L — SIGNIFICANT CHANGE UP (ref 135–145)
WBC # BLD: 7.79 K/UL — SIGNIFICANT CHANGE UP (ref 3.8–10.5)
WBC # FLD AUTO: 7.79 K/UL — SIGNIFICANT CHANGE UP (ref 3.8–10.5)

## 2023-05-25 PROCEDURE — 99233 SBSQ HOSP IP/OBS HIGH 50: CPT | Mod: GC

## 2023-05-25 RX ORDER — INSULIN LISPRO 100/ML
2 VIAL (ML) SUBCUTANEOUS
Refills: 0 | Status: DISCONTINUED | OUTPATIENT
Start: 2023-05-25 | End: 2023-05-28

## 2023-05-25 RX ORDER — INSULIN GLARGINE 100 [IU]/ML
4 INJECTION, SOLUTION SUBCUTANEOUS AT BEDTIME
Refills: 0 | Status: DISCONTINUED | OUTPATIENT
Start: 2023-05-25 | End: 2023-05-28

## 2023-05-25 RX ORDER — ACETAMINOPHEN 500 MG
650 TABLET ORAL EVERY 6 HOURS
Refills: 0 | Status: DISCONTINUED | OUTPATIENT
Start: 2023-05-25 | End: 2023-05-29

## 2023-05-25 RX ORDER — TAMSULOSIN HYDROCHLORIDE 0.4 MG/1
0.4 CAPSULE ORAL AT BEDTIME
Refills: 0 | Status: DISCONTINUED | OUTPATIENT
Start: 2023-05-25 | End: 2023-05-29

## 2023-05-25 RX ORDER — INSULIN LISPRO 100/ML
2 VIAL (ML) SUBCUTANEOUS ONCE
Refills: 0 | Status: COMPLETED | OUTPATIENT
Start: 2023-05-25 | End: 2023-05-25

## 2023-05-25 RX ADMIN — Medication 500000 UNIT(S): at 22:23

## 2023-05-25 RX ADMIN — Medication 1: at 09:06

## 2023-05-25 RX ADMIN — Medication 3 MILLILITER(S): at 04:43

## 2023-05-25 RX ADMIN — SODIUM CHLORIDE 1 GRAM(S): 9 INJECTION INTRAMUSCULAR; INTRAVENOUS; SUBCUTANEOUS at 22:30

## 2023-05-25 RX ADMIN — KETOTIFEN FUMARATE 1 DROP(S): 0.34 SOLUTION OPHTHALMIC at 17:31

## 2023-05-25 RX ADMIN — PIPERACILLIN AND TAZOBACTAM 25 GRAM(S): 4; .5 INJECTION, POWDER, LYOPHILIZED, FOR SOLUTION INTRAVENOUS at 06:19

## 2023-05-25 RX ADMIN — Medication 2 UNIT(S): at 09:06

## 2023-05-25 RX ADMIN — BUDESONIDE AND FORMOTEROL FUMARATE DIHYDRATE 2 PUFF(S): 160; 4.5 AEROSOL RESPIRATORY (INHALATION) at 09:09

## 2023-05-25 RX ADMIN — Medication 3 MILLILITER(S): at 10:23

## 2023-05-25 RX ADMIN — Medication 500000 UNIT(S): at 12:27

## 2023-05-25 RX ADMIN — Medication 2 UNIT(S): at 12:37

## 2023-05-25 RX ADMIN — Medication 3 MILLILITER(S): at 22:27

## 2023-05-25 RX ADMIN — PIPERACILLIN AND TAZOBACTAM 25 GRAM(S): 4; .5 INJECTION, POWDER, LYOPHILIZED, FOR SOLUTION INTRAVENOUS at 22:24

## 2023-05-25 RX ADMIN — SODIUM CHLORIDE 4 MILLILITER(S): 9 INJECTION INTRAMUSCULAR; INTRAVENOUS; SUBCUTANEOUS at 10:24

## 2023-05-25 RX ADMIN — SODIUM CHLORIDE 1 GRAM(S): 9 INJECTION INTRAMUSCULAR; INTRAVENOUS; SUBCUTANEOUS at 12:27

## 2023-05-25 RX ADMIN — Medication 500000 UNIT(S): at 17:30

## 2023-05-25 RX ADMIN — SODIUM CHLORIDE 4 MILLILITER(S): 9 INJECTION INTRAMUSCULAR; INTRAVENOUS; SUBCUTANEOUS at 22:27

## 2023-05-25 RX ADMIN — Medication 3: at 17:31

## 2023-05-25 RX ADMIN — Medication 2 UNIT(S): at 17:32

## 2023-05-25 RX ADMIN — PANTOPRAZOLE SODIUM 40 MILLIGRAM(S): 20 TABLET, DELAYED RELEASE ORAL at 09:09

## 2023-05-25 RX ADMIN — ENOXAPARIN SODIUM 30 MILLIGRAM(S): 100 INJECTION SUBCUTANEOUS at 06:22

## 2023-05-25 RX ADMIN — Medication 650 MILLIGRAM(S): at 22:22

## 2023-05-25 RX ADMIN — KETOTIFEN FUMARATE 1 DROP(S): 0.34 SOLUTION OPHTHALMIC at 06:21

## 2023-05-25 RX ADMIN — Medication 2: at 12:31

## 2023-05-25 RX ADMIN — AMLODIPINE BESYLATE 5 MILLIGRAM(S): 2.5 TABLET ORAL at 12:28

## 2023-05-25 RX ADMIN — BUDESONIDE AND FORMOTEROL FUMARATE DIHYDRATE 2 PUFF(S): 160; 4.5 AEROSOL RESPIRATORY (INHALATION) at 22:23

## 2023-05-25 RX ADMIN — SENNA PLUS 2 TABLET(S): 8.6 TABLET ORAL at 22:24

## 2023-05-25 RX ADMIN — SIMETHICONE 80 MILLIGRAM(S): 80 TABLET, CHEWABLE ORAL at 12:28

## 2023-05-25 RX ADMIN — TAMSULOSIN HYDROCHLORIDE 0.4 MILLIGRAM(S): 0.4 CAPSULE ORAL at 22:27

## 2023-05-25 RX ADMIN — PIPERACILLIN AND TAZOBACTAM 25 GRAM(S): 4; .5 INJECTION, POWDER, LYOPHILIZED, FOR SOLUTION INTRAVENOUS at 14:58

## 2023-05-25 RX ADMIN — Medication 4 MILLILITER(S): at 10:23

## 2023-05-25 RX ADMIN — Medication 20 MILLIGRAM(S): at 09:08

## 2023-05-25 RX ADMIN — SODIUM CHLORIDE 1 GRAM(S): 9 INJECTION INTRAMUSCULAR; INTRAVENOUS; SUBCUTANEOUS at 09:09

## 2023-05-25 RX ADMIN — Medication 50 MILLIGRAM(S): at 12:27

## 2023-05-25 RX ADMIN — POLYETHYLENE GLYCOL 3350 17 GRAM(S): 17 POWDER, FOR SOLUTION ORAL at 12:27

## 2023-05-25 RX ADMIN — Medication 3 MILLILITER(S): at 15:47

## 2023-05-25 RX ADMIN — Medication 2: at 22:23

## 2023-05-25 RX ADMIN — Medication 650 MILLIGRAM(S): at 23:22

## 2023-05-25 RX ADMIN — Medication 500000 UNIT(S): at 09:08

## 2023-05-25 RX ADMIN — MIRTAZAPINE 7.5 MILLIGRAM(S): 45 TABLET, ORALLY DISINTEGRATING ORAL at 22:24

## 2023-05-25 NOTE — PROVIDER CONTACT NOTE (OTHER) - NAME OF MD/NP/PA/DO NOTIFIED:
Shaun Ching
Demarcus Khan (Team 8)
Shaun Ching
Regina Escamilla, resident
Kirby Erickson
Shaun Ching, Resident

## 2023-05-25 NOTE — PROGRESS NOTE ADULT - ATTENDING COMMENTS
68F Primary Ciliary Dyskinesia with bronchiectasis – Home O2/AVAPS, HTN, p/w acute on chronic hypoxic hypercarbic respiratory failure from Pseudomonas PNA c/b acute encephalopathy, Hyponatremia, steroid induced DM2.  - Continue AVAPS on at night and PRN during the daytime  - will arrange for new AVAPS setting for home  - poor prognosis due to advancing pulmonary dysfunction  - Appreciate Palliative care consult - patient and family not amendable for hospice placement or comfort care.  - Meropenem IV, Cipro PO to Zosyn IV until 5/27 - can be changed to PO meds on discharge - appreciate ID recs  - Hyperglycemia and hypoglycemia - labile FS; pre-meal admelog and lantus  - Dispo: anticipate d/c to home - discuss with CM about home care  - discussed with family by the bedside for 10 minutes on 5/25. Answered all the questions.

## 2023-05-25 NOTE — PROGRESS NOTE ADULT - ASSESSMENT
67 yo F PMH PCD c/b bronchiectasis w/ multiple admissions for bronchiectasis exacerbation, HTN, COVID infection 1/2022, Pseudomonas and ESBL klebsiella, chronic hypoxemic on 2-4LNC and hypercapneic respiratory failure on AVAPS referred from outpatient pulmonologist for worsening mental status and lethargy found to be severely hyponatremia. Pulmonary consulted for bronchiectasis management and hypercarbic respiratory failure    - continue empiric abx, ID recommendations appreciated.  - Today is day 5 of prednisone 20mg, can discontinue after today's dose  - please maintain on q6 duonebs, q12 hypersal, mucomyst, and aerobika; chest PT and physical therapy  - TTE reviewed  - Blood gas stable this AM, pCO2 77 with pH 7.39  - Would continue AVAPS as needed and at night  - Palliative care input appreciated, morphine for dyspnea  - Patient should follow up with Dr. Young after discharge  - pulmonary will continue to follow    Carson Ramsay, PGY-7  Pulmonary and Critical Care Medicine  [id number]

## 2023-05-25 NOTE — PROGRESS NOTE ADULT - ATTENDING COMMENTS
Agree with above.    AVAPS settings that were changed on 5/22 appear to help increase total TV for patient. Her CO2 now appears to be close to baseline in the 70's low 80's. Patient without AMS. Can discontinue prednisone 20mg after 5 days. Continue empiric antibiotics as per ID. Continue airway clearance.     Should have followup with Dr. Young in the pulmonary clinic when discharged.     Prognosis is guarded.

## 2023-05-25 NOTE — PROGRESS NOTE ADULT - PROBLEM SELECTOR PLAN 1
Pt w/ AHRF w/ hypercapnia iso acute on chronic bronchiectasis due to primary biliary dyskinesia w/ SpCx positive for pseudomonas. Pt on AVAPS at home.   - s/p meropenum and cipro and transitioned to Zosyn for total 7 day abx course per ID.   - Pulm following: c/w q6 duonebs, q12 hypersal, mucomyst, and aerobika; chest PT and physical therapy  - On prednisone 20mg. F/u pulm for duration of course  - On Avaps qhs and evening. On NC during the day.   - VBG w/ compensated hypercarbia stable w/ CO2 ~80.   - Pt is DNR/DNI  - Palliative consulted w/ ongoing discussions for possible hospice care Pt w/ AHRF w/ hypercapnia iso acute on chronic bronchiectasis due to primary biliary dyskinesia w/ SpCx positive for pseudomonas. Pt on AVAPS at home.   - s/p meropenum and cipro and transitioned to Zosyn for total 7 day abx course per ID.   - Pulm following: c/w q6 duonebs, q12 hypersal, mucomyst, and aerobika; chest PT and physical therapy  - On prednisone 20mg. F/u pulm for duration of course  - On Avaps qhs and evening. On NC during the day.   - VBG w/ compensated hypercarbia stable w/ CO2 ~77. Prior CO2 on gas in April in mid 70's  - Pt is DNR/DNI  - Pt and family would like to continue with care and eventually take patient home once stable. Denied any ongoing [facility] discussions or palliative options.

## 2023-05-25 NOTE — PROGRESS NOTE ADULT - PROBLEM SELECTOR PLAN 7
Patient shifted in the ED with resolution. [History reviewed] : History reviewed. [Medications and Allergies reviewed] : Medications and allergies reviewed.

## 2023-05-25 NOTE — PROVIDER CONTACT NOTE (OTHER) - DATE AND TIME:
18-May-2023 12:30
20-May-2023 07:30
19-May-2023 22:00
20-May-2023 04:20
25-May-2023 22:01
19-May-2023 05:00

## 2023-05-25 NOTE — PROVIDER CONTACT NOTE (OTHER) - REASON
Pt bladder scan 520 mL, straight cath necessary
patient's bladder scan was 457
Bladderscan shows 341cc urine and pt unable to urinate
Pt. and pt. family refused lantus
Pt sitting in tripod position, having labored breathing
patient's BS was 505

## 2023-05-25 NOTE — PROVIDER CONTACT NOTE (OTHER) - SITUATION
Pt. and pt. family refused lantus
patient's bladder scan was 457
Pt bladder scan 520 mL, straight cath necessary
patient's BS was 505
Pt is bladder scan Q8 which showed 341 cc urine. Pt unable to void.
Pt sitting in tripod position, having labored breathing

## 2023-05-25 NOTE — PROVIDER CONTACT NOTE (CRITICAL VALUE NOTIFICATION) - ASSESSMENT
Patient is alert, eating and drinking with family members at bedside. Patient vital signs are stable, o2 saturation at 98% on 4L NC,  with no respiratory distress.
Pt is A&Ox2-3. VSS. Afebrile. Denies pain at this time. Pt s/p straight catheterization around 1245.
No signs or symptoms. Pt resting comfortably in bed
Pt is A+Ox1, confused and lethargic. Vital signs are stable with AVAP and on .

## 2023-05-25 NOTE — PROGRESS NOTE ADULT - SUBJECTIVE AND OBJECTIVE BOX
Snow Blood, PGY1    GRACIE BHAKTA  68y  Female    Complaints:  Subjective:     No acute o/n events. Pt denies subj fevers/chills, HA, dizziness, chest pain, palpitations, n/v, abd pain, dysuria, diarrhea      FAMILY HISTORY:  Family history of diabetes mellitus  father    Family history of allergic rhinitis (Child, Child)  son, daughter    Family history of bronchitis  mother, father      68yVital Signs Last 24 Hrs  T(C): 36.3 (25 May 2023 05:23), Max: 36.7 (24 May 2023 14:00)  T(F): 97.3 (25 May 2023 05:23), Max: 98 (24 May 2023 14:00)  HR: 64 (25 May 2023 05:23) (64 - 98)  BP: 122/57 (25 May 2023 05:23) (110/50 - 163/85)  BP(mean): --  RR: 19 (25 May 2023 05:23) (19 - 20)  SpO2: 100% (25 May 2023 05:23) (95% - 100%)    Parameters below as of 25 May 2023 05:23  Patient On (Oxygen Delivery Method): nasal cannula  O2 Flow (L/min): 3        PHYSICAL EXAM:  GENERAL: NAD, well-groomed, well-developed  HEAD:  Atraumatic, Normocephalic  EYES: EOMI, PERRLA, conjunctiva and sclera clear  ENMT: No tonsillar erythema, exudates, or enlargement; Moist mucous membranes, Good dentition, No lesions  NECK: Supple, No JVD, Normal thyroid  NERVOUS SYSTEM:  Alert & Oriented X3, Good concentration; Motor Strength 5/5 B/L upper and lower extremities; DTRs 2+ intact and symmetric  CHEST/LUNG: Clear to percussion bilaterally; No rales, rhonchi, wheezing, or rubs  HEART: Regular rate and rhythm; No murmurs, rubs, or gallops  ABDOMEN: Soft, Nontender, Nondistended; Bowel sounds present  EXTREMITIES:  2+ Peripheral Pulses, No clubbing, cyanosis, or edema  LYMPH: No lymphadenopathy noted  SKIN: No rashes or lesions      Consultant(s) Notes Reviewed:  [x ] YES  [ ] NO  Care Discussed with Consultants/Other Providers [ x] YES  [ ] NO    LABS:                Female  RADIOLOGY & ADDITIONAL TESTS:    Imaging Personally Reviewed:  [ ] YES  [ ] NO    MedsMEDICATIONS  (STANDING):  acetylcysteine 10%  Inhalation 4 milliLiter(s) Inhalation daily  albuterol/ipratropium for Nebulization 3 milliLiter(s) Nebulizer every 6 hours  amLODIPine   Tablet 5 milliGRAM(s) Oral daily  budesonide 160 MICROgram(s)/formoterol 4.5 MICROgram(s) Inhaler 2 Puff(s) Inhalation two times a day  dextrose 5%. 1000 milliLiter(s) (100 mL/Hr) IV Continuous <Continuous>  dextrose 5%. 1000 milliLiter(s) (50 mL/Hr) IV Continuous <Continuous>  dextrose 50% Injectable 25 Gram(s) IV Push once  dextrose 50% Injectable 12.5 Gram(s) IV Push once  dextrose 50% Injectable 25 Gram(s) IV Push once  enoxaparin Injectable 30 milliGRAM(s) SubCutaneous every 24 hours  glucagon  Injectable 1 milliGRAM(s) IntraMuscular once  insulin lispro (ADMELOG) corrective regimen sliding scale   SubCutaneous Before meals and at bedtime  insulin lispro Injectable (ADMELOG) 1 Unit(s) SubCutaneous three times a day before meals  ketotifen 0.025% Ophthalmic Solution 1 Drop(s) Both EYES two times a day  metoprolol succinate ER 50 milliGRAM(s) Oral daily  mirtazapine 7.5 milliGRAM(s) Oral at bedtime  nystatin    Suspension 237938 Unit(s) Oral four times a day  pantoprazole    Tablet 40 milliGRAM(s) Oral before breakfast  piperacillin/tazobactam IVPB.. 3.375 Gram(s) IV Intermittent every 8 hours  polyethylene glycol 3350 17 Gram(s) Oral daily  predniSONE   Tablet 20 milliGRAM(s) Oral daily  senna 2 Tablet(s) Oral at bedtime  simethicone 80 milliGRAM(s) Chew daily  sodium chloride 1 Gram(s) Oral three times a day  sodium chloride 3%  Inhalation 4 milliLiter(s) Inhalation every 12 hours    MEDICATIONS  (PRN):  bisacodyl 5 milliGRAM(s) Oral every 12 hours PRN Constipation  dextrose Oral Gel 15 Gram(s) Oral once PRN Blood Glucose LESS THAN 70 milliGRAM(s)/deciliter  fluticasone propionate 50 MICROgram(s)/spray Nasal Spray 1 Spray(s) Both Nostrils two times a day PRN nasal congestion      HEALTH ISSUES - PROBLEM Dx:  Acute hypercapnic respiratory failure    Metabolic encephalopathy    Hyponatremia    HTN (hypertension), benign    DM2 (diabetes mellitus, type 2)    Constipation    Hyperkalemia    Need for prophylactic measure    Functional quadriplegia    Advanced care planning/counseling discussion    Encounter for palliative care    Debility             [Alert] : alert [Well Nourished] : well nourished [Obese] : obese [No Acute Distress] : no acute distress [Normal Sclera/Conjunctiva] : normal sclera/conjunctiva [Well Developed] : well developed [EOMI] : extra ocular movement intact [No Proptosis] : no proptosis [Normal Oropharynx] : the oropharynx was normal [Thyroid Not Enlarged] : the thyroid was not enlarged [No Thyroid Nodules] : no palpable thyroid nodules [No Respiratory Distress] : no respiratory distress       Snow Blood, PGY1    GRACIE BHAKTA  68y  Female    Complaints:  Subjective:     No acute o/n events. Pt denies subj fevers/chills, HA, dizziness, chest pain, palpitations, n/v, abd pain, dysuria, diarrhea      FAMILY HISTORY:  Family history of diabetes mellitus  father    Family history of allergic rhinitis (Child, Child)  son, daughter    Family history of bronchitis  mother, father      68yVital Signs Last 24 Hrs  T(C): 36.3 (25 May 2023 05:23), Max: 36.7 (24 May 2023 14:00)  T(F): 97.3 (25 May 2023 05:23), Max: 98 (24 May 2023 14:00)  HR: 64 (25 May 2023 05:23) (64 - 98)  BP: 122/57 (25 May 2023 05:23) (110/50 - 163/85)  BP(mean): --  RR: 19 (25 May 2023 05:23) (19 - 20)  SpO2: 100% (25 May 2023 05:23) (95% - 100%)    Parameters below as of 25 May 2023 05:23  Patient On (Oxygen Delivery Method): nasal cannula  O2 Flow (L/min): 3        PHYSICAL EXAM:  GENERAL: Increased work of breathing. Cachectic appearance.   HEAD:  Atraumatic, Normocephalic  NECK: Supple, No JVD,  NERVOUS SYSTEM:  Alert & Oriented X3, Pt follows basic commands, unable to participate in conversation  CHEST/LUNG: +wheezing/rhonchi   HEART: Regular rate and rhythm; No murmurs  ABDOMEN: Soft, Nontender, Nondistended; Bowel sounds present  EXTREMITIES:  2+ Peripheral Pulses, No clubbing, cyanosis, or edema  LYMPH: No lymphadenopathy noted  SKIN: No rashes or lesionslesions      Consultant(s) Notes Reviewed:  [x ] YES  [ ] NO  Care Discussed with Consultants/Other Providers [ x] YES  [ ] NO    LABS:                          9.7    7.79  )-----------( 167      ( 25 May 2023 06:17 )             32.3       05-25    138  |  94<L>  |  18  ----------------------------<  124<H>  3.8   |  41<H>  |  0.43<L>    Ca    9.4      25 May 2023 06:17  Phos  3.5     05-25  Mg     2.00     05-25              CAPILLARY BLOOD GLUCOSE      POCT Blood Glucose.: 248 mg/dL (25 May 2023 12:12)        Female  RADIOLOGY & ADDITIONAL TESTS:    Imaging Personally Reviewed:  [ ] YES  [ ] NO    MedsMEDICATIONS  (STANDING):  acetylcysteine 10%  Inhalation 4 milliLiter(s) Inhalation daily  albuterol/ipratropium for Nebulization 3 milliLiter(s) Nebulizer every 6 hours  amLODIPine   Tablet 5 milliGRAM(s) Oral daily  budesonide 160 MICROgram(s)/formoterol 4.5 MICROgram(s) Inhaler 2 Puff(s) Inhalation two times a day  dextrose 5%. 1000 milliLiter(s) (100 mL/Hr) IV Continuous <Continuous>  dextrose 5%. 1000 milliLiter(s) (50 mL/Hr) IV Continuous <Continuous>  dextrose 50% Injectable 25 Gram(s) IV Push once  dextrose 50% Injectable 12.5 Gram(s) IV Push once  dextrose 50% Injectable 25 Gram(s) IV Push once  enoxaparin Injectable 30 milliGRAM(s) SubCutaneous every 24 hours  glucagon  Injectable 1 milliGRAM(s) IntraMuscular once  insulin lispro (ADMELOG) corrective regimen sliding scale   SubCutaneous Before meals and at bedtime  insulin lispro Injectable (ADMELOG) 1 Unit(s) SubCutaneous three times a day before meals  ketotifen 0.025% Ophthalmic Solution 1 Drop(s) Both EYES two times a day  metoprolol succinate ER 50 milliGRAM(s) Oral daily  mirtazapine 7.5 milliGRAM(s) Oral at bedtime  nystatin    Suspension 001813 Unit(s) Oral four times a day  pantoprazole    Tablet 40 milliGRAM(s) Oral before breakfast  piperacillin/tazobactam IVPB.. 3.375 Gram(s) IV Intermittent every 8 hours  polyethylene glycol 3350 17 Gram(s) Oral daily  predniSONE   Tablet 20 milliGRAM(s) Oral daily  senna 2 Tablet(s) Oral at bedtime  simethicone 80 milliGRAM(s) Chew daily  sodium chloride 1 Gram(s) Oral three times a day  sodium chloride 3%  Inhalation 4 milliLiter(s) Inhalation every 12 hours    MEDICATIONS  (PRN):  bisacodyl 5 milliGRAM(s) Oral every 12 hours PRN Constipation  dextrose Oral Gel 15 Gram(s) Oral once PRN Blood Glucose LESS THAN 70 milliGRAM(s)/deciliter  fluticasone propionate 50 MICROgram(s)/spray Nasal Spray 1 Spray(s) Both Nostrils two times a day PRN nasal congestion      HEALTH ISSUES - PROBLEM Dx:  Acute hypercapnic respiratory failure    Metabolic encephalopathy    Hyponatremia    HTN (hypertension), benign    DM2 (diabetes mellitus, type 2)    Constipation    Hyperkalemia    Need for prophylactic measure    Functional quadriplegia    Advanced care planning/counseling discussion    Encounter for palliative care    Debility             [No Accessory Muscle Use] : no accessory muscle use [Clear to Auscultation] : lungs were clear to auscultation bilaterally [Normal S1, S2] : normal S1 and S2 [Normal Rate] : heart rate was normal [Regular Rhythm] : with a regular rhythm [No Edema] : no peripheral edema [Pedal Pulses Normal] : the pedal pulses are present [Normal Bowel Sounds] : normal bowel sounds [Not Tender] : non-tender [Not Distended] : not distended [Soft] : abdomen soft [Normal Anterior Cervical Nodes] : no anterior cervical lymphadenopathy [No Spinal Tenderness] : no spinal tenderness [Spine Straight] : spine straight [No Stigmata of Cushings Syndrome] : no stigmata of Cushings Syndrome [Normal Gait] : normal gait [Normal Strength/Tone] : muscle strength and tone were normal [No Rash] : no rash [Acanthosis Nigricans] : no acanthosis nigricans [Normal] : normal [Full ROM] : with full range of motion [Diminished Throughout Both Feet] : normal tactile sensation with monofilament testing throughout both feet [Normal Reflexes] : deep tendon reflexes were 2+ and symmetric [No Tremors] : no tremors [Oriented x3] : oriented to person, place, and time

## 2023-05-25 NOTE — PROVIDER CONTACT NOTE (CRITICAL VALUE NOTIFICATION) - TEST AND RESULT REPORTED:
Venous Bicarbonate on Blood Gas is 45
BiCarb venous blood gas 47
Sodium 118
details
Sodium 117
BMP - Sodium 116
Venous Blood Gas 47

## 2023-05-25 NOTE — PROVIDER CONTACT NOTE (CRITICAL VALUE NOTIFICATION) - NAME OF MD/NP/PA/DO NOTIFIED:
House team 8 MD paged with no response ([id number], [id number])
Charly Alcala
Dr. Liborio Berry
Charly Alcala
Demarcus Khan (TEAM 8)
Olga Webb (MD) & [facility])

## 2023-05-25 NOTE — PROGRESS NOTE ADULT - PROBLEM SELECTOR PLAN 5
Pt on prednisone 20mg w/ hyperglycemia on hospital labs.   - Will increase Lantus from 2U to 7U  - Add mealtime admelog 2U TID  - c/w ISS  - c/t monitor glucose. Pt on prednisone 20mg w/ hyperglycemia on hospital labs.   - Will increase Lantus from 2U to 4U  - Add mealtime admelog 2U TID  - c/w ISS  - c/t monitor glucose.

## 2023-05-25 NOTE — PROVIDER CONTACT NOTE (CRITICAL VALUE NOTIFICATION) - BACKGROUND
Patient admitted for Altered Mental Status. PMX, Bronchiectasis, anxiety, depression and recurrent pneumonia.
Pt admitted for altered mental status
Patient is admitted for Altered Mental status. PMX, of Bronchiectasis, anxiety, depression and recurrent pneumonia.
Pt admitted with AMS

## 2023-05-25 NOTE — PROVIDER CONTACT NOTE (OTHER) - RECOMMENDATIONS
Come to the bedside to see the patient?
order for straight cath
order for straight cath
RN educated pt. and family on long-acting insulins and offer to supplement snacks to prevent hypoglycemia episodes. Patient and family continued to refuse.
Straight cath?
Straight catheterization

## 2023-05-25 NOTE — PROVIDER CONTACT NOTE (OTHER) - ASSESSMENT
Pt is A&Ox2. Abdomen distended. Denies pain but feels pressure to void but is unable to void.
AxO x2.patient's Bladder scan was 505cc
Pt. and pt. family refused lantus. . Pt. will only accept 2 units of admelog. Pt. and pt. family worry that she will become hypoglycemic by the morning.
Pt AxO2. Pt sitting in tripod position, having labored breathing. Looking worse after removing AVAPS
Pt A&Ox2, VSS. No acute signs of distress noted. Pt has no c/o discomfort or pain.
Pt AxOx2. patient's bladder scan was 457.

## 2023-05-25 NOTE — PROVIDER CONTACT NOTE (OTHER) - BACKGROUND
Pt. with type 2 DM
pt admitted for altered mental status
Pt admitted with altered mental status
patient admitted for altered mental status
Pt admitted for AMS
patient admitted for altered mental status

## 2023-05-25 NOTE — PROVIDER CONTACT NOTE (CRITICAL VALUE NOTIFICATION) - SITUATION
Critical Value Notification- BiCarb venous blood gas 47
Venous Bicarbonate on Blood Gas is 45
Pt noted with sodium 116
Venous Blood Gas 47

## 2023-05-25 NOTE — PROVIDER CONTACT NOTE (CRITICAL VALUE NOTIFICATION) - ACTION/TREATMENT ORDERED:
Will put in orders soon.
Provider notified, will continue to monitor the patient's oxygen saturation  throughout shift.
Provider notified, will continue to monitor.
Providers aware. Waiting on BMP results from AM lab collection. No interventions needed at this time. Continue to monitor

## 2023-05-25 NOTE — PROGRESS NOTE ADULT - SUBJECTIVE AND OBJECTIVE BOX
CHIEF COMPLAINT: Respiratory failure    Interval Events: No acute events overnight. This AM off AVAPS, reports feeling well today.     REVIEW OF SYSTEMS:  Constitutional: No fevers or chills.   Eyes: No itching or discharge from the eyes  ENT: No ear pain. No ear discharge. No nasal congestion.   CV: No chest pain. No palpitations. No lightheadedness or dizziness.   Resp: No dyspnea at rest.   GI: No nausea. No vomiting. No diarrhea.  MSK: No joint pain or pain in any extremities  Integumentary: No skin lesions. No pedal edema.  Neurological: No gross motor weakness. No sensory changes.  [x] All other systems negative  [ ] Unable to assess ROS because ________    OBJECTIVE:  ICU Vital Signs Last 24 Hrs  T(C): 36.3 (25 May 2023 05:23), Max: 36.7 (24 May 2023 14:00)  T(F): 97.3 (25 May 2023 05:23), Max: 98 (24 May 2023 14:00)  HR: 73 (25 May 2023 07:51) (64 - 98)  BP: 122/57 (25 May 2023 05:23) (110/50 - 163/85)  BP(mean): --  ABP: --  ABP(mean): --  RR: 19 (25 May 2023 05:23) (19 - 20)  SpO2: 97% (25 May 2023 07:51) (95% - 100%)    O2 Parameters below as of 25 May 2023 05:23  Patient On (Oxygen Delivery Method): nasal cannula  O2 Flow (L/min): 3        Mode: NIV (Noninvasive Ventilation), RR (machine): 18, TV (machine): 450, FiO2: 35, PEEP: 8, ITime: 0.9, P-High: 30, P-Low: 20, PIP: 20    05-24 @ 07:01  -  05-25 @ 07:00  --------------------------------------------------------  IN: 0 mL / OUT: 1950 mL / NET: -1950 mL      CAPILLARY BLOOD GLUCOSE      POCT Blood Glucose.: 365 mg/dL (24 May 2023 22:50)      PHYSICAL EXAM:  General: Awake, alert, oriented X 3.   HEENT: Atraumatic, normocephalic.   Lymph Nodes: No palpable lymphadenopathy  Neck: No JVD. No carotid bruit.   Respiratory: Normal chest expansion. Scattered crackles.  Cardiovascular: S1 S2 normal. No murmurs, rubs or gallops.   Abdomen: Soft, non-tender, non-distended. No organomegaly.  Extremities: Warm to touch. Peripheral pulse palpable. No pedal edema.   Skin: No rashes or skin lesions  Neurological: Motor and sensory examination equal and normal in all four extremities.  Psychiatry: Appropriate mood and affect.    HOSPITAL MEDICATIONS:  MEDICATIONS  (STANDING):  acetylcysteine 10%  Inhalation 4 milliLiter(s) Inhalation daily  albuterol/ipratropium for Nebulization 3 milliLiter(s) Nebulizer every 6 hours  amLODIPine   Tablet 5 milliGRAM(s) Oral daily  budesonide 160 MICROgram(s)/formoterol 4.5 MICROgram(s) Inhaler 2 Puff(s) Inhalation two times a day  dextrose 5%. 1000 milliLiter(s) (100 mL/Hr) IV Continuous <Continuous>  dextrose 5%. 1000 milliLiter(s) (50 mL/Hr) IV Continuous <Continuous>  dextrose 50% Injectable 25 Gram(s) IV Push once  dextrose 50% Injectable 12.5 Gram(s) IV Push once  dextrose 50% Injectable 25 Gram(s) IV Push once  enoxaparin Injectable 30 milliGRAM(s) SubCutaneous every 24 hours  glucagon  Injectable 1 milliGRAM(s) IntraMuscular once  insulin glargine Injectable (LANTUS) 4 Unit(s) SubCutaneous at bedtime  insulin lispro (ADMELOG) corrective regimen sliding scale   SubCutaneous Before meals and at bedtime  insulin lispro Injectable (ADMELOG) 2 Unit(s) SubCutaneous three times a day before meals  ketotifen 0.025% Ophthalmic Solution 1 Drop(s) Both EYES two times a day  metoprolol succinate ER 50 milliGRAM(s) Oral daily  mirtazapine 7.5 milliGRAM(s) Oral at bedtime  nystatin    Suspension 144838 Unit(s) Oral four times a day  pantoprazole    Tablet 40 milliGRAM(s) Oral before breakfast  piperacillin/tazobactam IVPB.. 3.375 Gram(s) IV Intermittent every 8 hours  polyethylene glycol 3350 17 Gram(s) Oral daily  predniSONE   Tablet 20 milliGRAM(s) Oral daily  senna 2 Tablet(s) Oral at bedtime  simethicone 80 milliGRAM(s) Chew daily  sodium chloride 1 Gram(s) Oral three times a day  sodium chloride 3%  Inhalation 4 milliLiter(s) Inhalation every 12 hours  tamsulosin 0.4 milliGRAM(s) Oral at bedtime    MEDICATIONS  (PRN):  bisacodyl 5 milliGRAM(s) Oral every 12 hours PRN Constipation  dextrose Oral Gel 15 Gram(s) Oral once PRN Blood Glucose LESS THAN 70 milliGRAM(s)/deciliter  fluticasone propionate 50 MICROgram(s)/spray Nasal Spray 1 Spray(s) Both Nostrils two times a day PRN nasal congestion      LABS:                        9.7    7.79  )-----------( 167      ( 25 May 2023 06:17 )             32.3     05-25    138  |  94<L>  |  18  ----------------------------<  124<H>  3.8   |  41<H>  |  0.43<L>    Ca    9.4      25 May 2023 06:17  Phos  3.5     05-25  Mg     2.00     05-25            Venous Blood Gas:  05-25 @ 06:17  7.39/77/127/47/98.9  VBG Lactate: 0.8  Venous Blood Gas:  05-24 @ 05:15  7.39/79/87/48/98.5  VBG Lactate: 1.0      MICROBIOLOGY:     RADIOLOGY:  [ ] Reviewed and interpreted by me    Point of Care Ultrasound Findings:    PFT:    EKG:

## 2023-05-25 NOTE — PROGRESS NOTE ADULT - PROBLEM SELECTOR PLAN 8
Diet: CC/DASH no beef/pork  Psych/Sleep: None FRANCES  GI: Protonix  DVT ppx: Lovenox 40mg   Code Status: Pending further discussion and palliative consult  Dispo: Pending resolution of hypercapnia. Diet: CC/DASH no beef/pork  Psych/Sleep: None FRANCES  GI: Protonix  DVT ppx: Lovenox 40mg   Code Status: Pending further discussion and palliative consult  Dispo: Pending resolution of hypercapnia will go home.

## 2023-05-25 NOTE — PROVIDER CONTACT NOTE (OTHER) - ACTION/TREATMENT ORDERED:
RN notified provider. Provider aware and no new orders at this time.
Straight catheterization ordered
order for straight cath
order for francisco placed
Straight cath
will come to the bedside to see the patient

## 2023-05-26 ENCOUNTER — TRANSCRIPTION ENCOUNTER (OUTPATIENT)
Age: 68
End: 2023-05-26

## 2023-05-26 LAB
ANION GAP SERPL CALC-SCNC: 9 MMOL/L — SIGNIFICANT CHANGE UP (ref 7–14)
BUN SERPL-MCNC: 20 MG/DL — SIGNIFICANT CHANGE UP (ref 7–23)
CALCIUM SERPL-MCNC: 9.5 MG/DL — SIGNIFICANT CHANGE UP (ref 8.4–10.5)
CHLORIDE SERPL-SCNC: 94 MMOL/L — LOW (ref 98–107)
CO2 SERPL-SCNC: 36 MMOL/L — HIGH (ref 22–31)
CREAT SERPL-MCNC: 0.45 MG/DL — LOW (ref 0.5–1.3)
EGFR: 106 ML/MIN/1.73M2 — SIGNIFICANT CHANGE UP
GAS PNL BLDV: SIGNIFICANT CHANGE UP
GLUCOSE SERPL-MCNC: 196 MG/DL — HIGH (ref 70–99)
HCT VFR BLD CALC: 33.1 % — LOW (ref 34.5–45)
HGB BLD-MCNC: 9.9 G/DL — LOW (ref 11.5–15.5)
MAGNESIUM SERPL-MCNC: 2.1 MG/DL — SIGNIFICANT CHANGE UP (ref 1.6–2.6)
MCHC RBC-ENTMCNC: 26.1 PG — LOW (ref 27–34)
MCHC RBC-ENTMCNC: 29.9 GM/DL — LOW (ref 32–36)
MCV RBC AUTO: 87.1 FL — SIGNIFICANT CHANGE UP (ref 80–100)
NRBC # BLD: 0 /100 WBCS — SIGNIFICANT CHANGE UP (ref 0–0)
NRBC # FLD: 0 K/UL — SIGNIFICANT CHANGE UP (ref 0–0)
PHOSPHATE SERPL-MCNC: 4 MG/DL — SIGNIFICANT CHANGE UP (ref 2.5–4.5)
PLATELET # BLD AUTO: 173 K/UL — SIGNIFICANT CHANGE UP (ref 150–400)
POTASSIUM SERPL-MCNC: 4.4 MMOL/L — SIGNIFICANT CHANGE UP (ref 3.5–5.3)
POTASSIUM SERPL-SCNC: 4.4 MMOL/L — SIGNIFICANT CHANGE UP (ref 3.5–5.3)
RBC # BLD: 3.8 M/UL — SIGNIFICANT CHANGE UP (ref 3.8–5.2)
RBC # FLD: 13.2 % — SIGNIFICANT CHANGE UP (ref 10.3–14.5)
SODIUM SERPL-SCNC: 139 MMOL/L — SIGNIFICANT CHANGE UP (ref 135–145)
WBC # BLD: 6.88 K/UL — SIGNIFICANT CHANGE UP (ref 3.8–10.5)
WBC # FLD AUTO: 6.88 K/UL — SIGNIFICANT CHANGE UP (ref 3.8–10.5)

## 2023-05-26 PROCEDURE — 99233 SBSQ HOSP IP/OBS HIGH 50: CPT | Mod: GC

## 2023-05-26 RX ORDER — ALBUTEROL 90 UG/1
2 AEROSOL, METERED ORAL
Qty: 0 | Refills: 0 | DISCHARGE

## 2023-05-26 RX ORDER — FLUTICASONE PROPIONATE 50 MCG
1 SPRAY, SUSPENSION NASAL
Refills: 0 | Status: DISCONTINUED | OUTPATIENT
Start: 2023-05-26 | End: 2023-05-29

## 2023-05-26 RX ORDER — SODIUM CHLORIDE 0.65 %
1 AEROSOL, SPRAY (ML) NASAL THREE TIMES A DAY
Refills: 0 | Status: DISCONTINUED | OUTPATIENT
Start: 2023-05-26 | End: 2023-05-29

## 2023-05-26 RX ORDER — PIPERACILLIN AND TAZOBACTAM 4; .5 G/20ML; G/20ML
3.38 INJECTION, POWDER, LYOPHILIZED, FOR SOLUTION INTRAVENOUS EVERY 8 HOURS
Refills: 0 | Status: DISCONTINUED | OUTPATIENT
Start: 2023-05-26 | End: 2023-05-28

## 2023-05-26 RX ADMIN — Medication 2 UNIT(S): at 08:51

## 2023-05-26 RX ADMIN — KETOTIFEN FUMARATE 1 DROP(S): 0.34 SOLUTION OPHTHALMIC at 17:36

## 2023-05-26 RX ADMIN — BUDESONIDE AND FORMOTEROL FUMARATE DIHYDRATE 2 PUFF(S): 160; 4.5 AEROSOL RESPIRATORY (INHALATION) at 08:50

## 2023-05-26 RX ADMIN — POLYETHYLENE GLYCOL 3350 17 GRAM(S): 17 POWDER, FOR SOLUTION ORAL at 12:51

## 2023-05-26 RX ADMIN — SODIUM CHLORIDE 1 GRAM(S): 9 INJECTION INTRAMUSCULAR; INTRAVENOUS; SUBCUTANEOUS at 06:06

## 2023-05-26 RX ADMIN — Medication 500000 UNIT(S): at 12:51

## 2023-05-26 RX ADMIN — SODIUM CHLORIDE 1 GRAM(S): 9 INJECTION INTRAMUSCULAR; INTRAVENOUS; SUBCUTANEOUS at 12:56

## 2023-05-26 RX ADMIN — INSULIN GLARGINE 4 UNIT(S): 100 INJECTION, SOLUTION SUBCUTANEOUS at 22:59

## 2023-05-26 RX ADMIN — Medication 500000 UNIT(S): at 22:09

## 2023-05-26 RX ADMIN — Medication 3: at 12:47

## 2023-05-26 RX ADMIN — SIMETHICONE 80 MILLIGRAM(S): 80 TABLET, CHEWABLE ORAL at 12:51

## 2023-05-26 RX ADMIN — PIPERACILLIN AND TAZOBACTAM 25 GRAM(S): 4; .5 INJECTION, POWDER, LYOPHILIZED, FOR SOLUTION INTRAVENOUS at 22:08

## 2023-05-26 RX ADMIN — Medication 4 MILLILITER(S): at 09:21

## 2023-05-26 RX ADMIN — Medication 2: at 08:51

## 2023-05-26 RX ADMIN — MIRTAZAPINE 7.5 MILLIGRAM(S): 45 TABLET, ORALLY DISINTEGRATING ORAL at 22:09

## 2023-05-26 RX ADMIN — Medication 50 MILLIGRAM(S): at 06:05

## 2023-05-26 RX ADMIN — TAMSULOSIN HYDROCHLORIDE 0.4 MILLIGRAM(S): 0.4 CAPSULE ORAL at 22:09

## 2023-05-26 RX ADMIN — Medication 500000 UNIT(S): at 17:36

## 2023-05-26 RX ADMIN — Medication 3 MILLILITER(S): at 04:57

## 2023-05-26 RX ADMIN — SENNA PLUS 2 TABLET(S): 8.6 TABLET ORAL at 22:10

## 2023-05-26 RX ADMIN — Medication 3 MILLILITER(S): at 21:12

## 2023-05-26 RX ADMIN — SODIUM CHLORIDE 4 MILLILITER(S): 9 INJECTION INTRAMUSCULAR; INTRAVENOUS; SUBCUTANEOUS at 21:12

## 2023-05-26 RX ADMIN — BUDESONIDE AND FORMOTEROL FUMARATE DIHYDRATE 2 PUFF(S): 160; 4.5 AEROSOL RESPIRATORY (INHALATION) at 22:08

## 2023-05-26 RX ADMIN — AMLODIPINE BESYLATE 5 MILLIGRAM(S): 2.5 TABLET ORAL at 06:05

## 2023-05-26 RX ADMIN — Medication 2 UNIT(S): at 17:37

## 2023-05-26 RX ADMIN — Medication 3: at 22:59

## 2023-05-26 RX ADMIN — Medication 500000 UNIT(S): at 06:06

## 2023-05-26 RX ADMIN — PIPERACILLIN AND TAZOBACTAM 25 GRAM(S): 4; .5 INJECTION, POWDER, LYOPHILIZED, FOR SOLUTION INTRAVENOUS at 12:49

## 2023-05-26 RX ADMIN — Medication 2 UNIT(S): at 12:48

## 2023-05-26 RX ADMIN — KETOTIFEN FUMARATE 1 DROP(S): 0.34 SOLUTION OPHTHALMIC at 06:07

## 2023-05-26 RX ADMIN — SODIUM CHLORIDE 4 MILLILITER(S): 9 INJECTION INTRAMUSCULAR; INTRAVENOUS; SUBCUTANEOUS at 09:21

## 2023-05-26 RX ADMIN — ENOXAPARIN SODIUM 30 MILLIGRAM(S): 100 INJECTION SUBCUTANEOUS at 06:06

## 2023-05-26 RX ADMIN — Medication 3 MILLILITER(S): at 09:21

## 2023-05-26 RX ADMIN — Medication 2: at 17:36

## 2023-05-26 RX ADMIN — Medication 30 MILLILITER(S): at 19:45

## 2023-05-26 RX ADMIN — Medication 3 MILLILITER(S): at 15:33

## 2023-05-26 RX ADMIN — PANTOPRAZOLE SODIUM 40 MILLIGRAM(S): 20 TABLET, DELAYED RELEASE ORAL at 06:05

## 2023-05-26 RX ADMIN — Medication 1 SPRAY(S): at 17:39

## 2023-05-26 NOTE — PROGRESS NOTE ADULT - PROBLEM SELECTOR PLAN 5
Pt on prednisone 20mg w/ hyperglycemia on hospital labs.   - Will increase Lantus from 2U to 4U  - Add mealtime admelog 2U TID  - c/w ISS  - c/t monitor glucose.

## 2023-05-26 NOTE — PHYSICAL THERAPY INITIAL EVALUATION ADULT - ADDITIONAL COMMENTS
patient has 4-5 steps with railings to enter and 2 steps inside the home to negotiate patient has 4-5 steps with railings to enter and 2 steps inside the home to negotiate; patient uses home 02 at 3.5L/min via NC

## 2023-05-26 NOTE — PROGRESS NOTE ADULT - TIME BILLING
Medical management as above, reviewing chart and coordinating care with primary team/staff, as well as reviewing vitals, radiology, medication list, recent labs, and prior records.
Time spent for extensive review of the physical chart, electronic health record, and documentation to obtain collateral information including but not limited to:    - Current inpatient records (ED, H&P, primary team, and consultants if applicable)   - Inpatient values/results (biomarkers, immunoassays, imaging, and microbiology results)   - Current or proposed treatment plans  Time spent discussing and coordinating care with primary team and interdisciplinary staff and floor staff.
Reviewed lab data, radiology results, consultants' recommendations, documentations on chart, discussed with Resident, interdisciplinary staff and/or interventions were performed.

## 2023-05-26 NOTE — PROGRESS NOTE ADULT - SUBJECTIVE AND OBJECTIVE BOX
Snow Blood, PGY1    GRACIE BHAKTA  68y  Female    Complaints:  Subjective:     No acute o/n events. Pt denies subj fevers/chills, HA, dizziness, chest pain, palpitations, n/v, abd pain, dysuria, diarrhea      FAMILY HISTORY:  Family history of diabetes mellitus  father    Family history of allergic rhinitis (Child, Child)  son, daughter    Family history of bronchitis  mother, father      68yVital Signs Last 24 Hrs  T(C): 37.2 (26 May 2023 06:06), Max: 37.4 (25 May 2023 21:45)  T(F): 98.9 (26 May 2023 06:06), Max: 99.3 (25 May 2023 21:45)  HR: 66 (26 May 2023 06:06) (66 - 81)  BP: 127/62 (26 May 2023 06:06) (121/67 - 139/65)  BP(mean): --  RR: 18 (26 May 2023 06:06) (18 - 18)  SpO2: 100% (26 May 2023 06:06) (96% - 100%)    Parameters below as of 26 May 2023 06:06  Patient On (Oxygen Delivery Method): nasal cannula  O2 Flow (L/min): 3        PHYSICAL EXAM:  GENERAL: NAD, well-groomed, well-developed  HEAD:  Atraumatic, Normocephalic  EYES: EOMI, PERRLA, conjunctiva and sclera clear  ENMT: No tonsillar erythema, exudates, or enlargement; Moist mucous membranes, Good dentition, No lesions  NECK: Supple, No JVD, Normal thyroid  NERVOUS SYSTEM:  Alert & Oriented X3, Good concentration; Motor Strength 5/5 B/L upper and lower extremities; DTRs 2+ intact and symmetric  CHEST/LUNG: Clear to percussion bilaterally; No rales, rhonchi, wheezing, or rubs  HEART: Regular rate and rhythm; No murmurs, rubs, or gallops  ABDOMEN: Soft, Nontender, Nondistended; Bowel sounds present  EXTREMITIES:  2+ Peripheral Pulses, No clubbing, cyanosis, or edema  LYMPH: No lymphadenopathy noted  SKIN: No rashes or lesions      Consultant(s) Notes Reviewed:  [x ] YES  [ ] NO  Care Discussed with Consultants/Other Providers [ x] YES  [ ] NO    LABS:                Female  RADIOLOGY & ADDITIONAL TESTS:    Imaging Personally Reviewed:  [ ] YES  [ ] NO    MedsMEDICATIONS  (STANDING):  acetylcysteine 10%  Inhalation 4 milliLiter(s) Inhalation daily  albuterol/ipratropium for Nebulization 3 milliLiter(s) Nebulizer every 6 hours  amLODIPine   Tablet 5 milliGRAM(s) Oral daily  budesonide 160 MICROgram(s)/formoterol 4.5 MICROgram(s) Inhaler 2 Puff(s) Inhalation two times a day  dextrose 5%. 1000 milliLiter(s) (100 mL/Hr) IV Continuous <Continuous>  dextrose 5%. 1000 milliLiter(s) (50 mL/Hr) IV Continuous <Continuous>  dextrose 50% Injectable 25 Gram(s) IV Push once  dextrose 50% Injectable 12.5 Gram(s) IV Push once  dextrose 50% Injectable 25 Gram(s) IV Push once  enoxaparin Injectable 30 milliGRAM(s) SubCutaneous every 24 hours  glucagon  Injectable 1 milliGRAM(s) IntraMuscular once  insulin glargine Injectable (LANTUS) 4 Unit(s) SubCutaneous at bedtime  insulin lispro (ADMELOG) corrective regimen sliding scale   SubCutaneous Before meals and at bedtime  insulin lispro Injectable (ADMELOG) 2 Unit(s) SubCutaneous three times a day before meals  ketotifen 0.025% Ophthalmic Solution 1 Drop(s) Both EYES two times a day  metoprolol succinate ER 50 milliGRAM(s) Oral daily  mirtazapine 7.5 milliGRAM(s) Oral at bedtime  nystatin    Suspension 310378 Unit(s) Oral four times a day  pantoprazole    Tablet 40 milliGRAM(s) Oral before breakfast  polyethylene glycol 3350 17 Gram(s) Oral daily  senna 2 Tablet(s) Oral at bedtime  simethicone 80 milliGRAM(s) Chew daily  sodium chloride 1 Gram(s) Oral three times a day  sodium chloride 3%  Inhalation 4 milliLiter(s) Inhalation every 12 hours  tamsulosin 0.4 milliGRAM(s) Oral at bedtime    MEDICATIONS  (PRN):  acetaminophen     Tablet .. 650 milliGRAM(s) Oral every 6 hours PRN Mild Pain (1 - 3)  bisacodyl 5 milliGRAM(s) Oral every 12 hours PRN Constipation  dextrose Oral Gel 15 Gram(s) Oral once PRN Blood Glucose LESS THAN 70 milliGRAM(s)/deciliter  fluticasone propionate 50 MICROgram(s)/spray Nasal Spray 1 Spray(s) Both Nostrils two times a day PRN nasal congestion      HEALTH ISSUES - PROBLEM Dx:  Acute hypercapnic respiratory failure    Sepsis with acute hypercapnic respiratory failure and septic shock, due to unspecified organism    Metabolic encephalopathy    HTN (hypertension), benign    DM2 (diabetes mellitus, type 2)    Constipation    Hyperkalemia    Need for prophylactic measure    Functional quadriplegia    Advanced care planning/counseling discussion    Encounter for palliative care    Debility    Hyponatremia                   Snow Blood, PGY1    GRACIE BHAKTA  68y  Female    Complaints:  Subjective:     Offered patient  services but patient and family refused, would prefer family translate.   No acute o/n events. Pt denies subj fevers/chills, HA, dizziness, chest pain, palpitations, n/v, abd pain, dysuria, diarrhea      FAMILY HISTORY:  Family history of diabetes mellitus  father    Family history of allergic rhinitis (Child, Child)  son, daughter    Family history of bronchitis  mother, father      68yVital Signs Last 24 Hrs  T(C): 37.2 (26 May 2023 06:06), Max: 37.4 (25 May 2023 21:45)  T(F): 98.9 (26 May 2023 06:06), Max: 99.3 (25 May 2023 21:45)  HR: 66 (26 May 2023 06:06) (66 - 81)  BP: 127/62 (26 May 2023 06:06) (121/67 - 139/65)  BP(mean): --  RR: 18 (26 May 2023 06:06) (18 - 18)  SpO2: 100% (26 May 2023 06:06) (96% - 100%)    Parameters below as of 26 May 2023 06:06  Patient On (Oxygen Delivery Method): nasal cannula  O2 Flow (L/min): 3        PHYSICAL EXAM:  GENERAL: Increased work of breathing. Cachectic appearance.   HEAD:  Atraumatic, Normocephalic  NECK: Supple, No JVD,  NERVOUS SYSTEM:  Alert & Oriented X3, Pt follows basic commands, unable to participate in conversation  CHEST/LUNG: +wheezing/rhonchi   HEART: Regular rate and rhythm; No murmurs  ABDOMEN: Soft, Nontender, Nondistended; Bowel sounds present  EXTREMITIES:  2+ Peripheral Pulses, No clubbing, cyanosis, or edema  LYMPH: No lymphadenopathy noted  SKIN: No rashes or lesionslesions      Consultant(s) Notes Reviewed:  [x ] YES  [ ] NO  Care Discussed with Consultants/Other Providers [ x] YES  [ ] NO    LABS:                          9.9    6.88  )-----------( 173      ( 26 May 2023 06:33 )             33.1       05-26    139  |  94<L>  |  20  ----------------------------<  196<H>  4.4   |  36<H>  |  0.45<L>    Ca    9.5      26 May 2023 06:33  Phos  4.0     05-26  Mg     2.10     05-26              CAPILLARY BLOOD GLUCOSE      POCT Blood Glucose.: 223 mg/dL (26 May 2023 08:43)        Female  RADIOLOGY & ADDITIONAL TESTS:    Imaging Personally Reviewed:  [ ] YES  [ ] NO    MedsMEDICATIONS  (STANDING):  acetylcysteine 10%  Inhalation 4 milliLiter(s) Inhalation daily  albuterol/ipratropium for Nebulization 3 milliLiter(s) Nebulizer every 6 hours  amLODIPine   Tablet 5 milliGRAM(s) Oral daily  budesonide 160 MICROgram(s)/formoterol 4.5 MICROgram(s) Inhaler 2 Puff(s) Inhalation two times a day  dextrose 5%. 1000 milliLiter(s) (100 mL/Hr) IV Continuous <Continuous>  dextrose 5%. 1000 milliLiter(s) (50 mL/Hr) IV Continuous <Continuous>  dextrose 50% Injectable 25 Gram(s) IV Push once  dextrose 50% Injectable 12.5 Gram(s) IV Push once  dextrose 50% Injectable 25 Gram(s) IV Push once  enoxaparin Injectable 30 milliGRAM(s) SubCutaneous every 24 hours  glucagon  Injectable 1 milliGRAM(s) IntraMuscular once  insulin glargine Injectable (LANTUS) 4 Unit(s) SubCutaneous at bedtime  insulin lispro (ADMELOG) corrective regimen sliding scale   SubCutaneous Before meals and at bedtime  insulin lispro Injectable (ADMELOG) 2 Unit(s) SubCutaneous three times a day before meals  ketotifen 0.025% Ophthalmic Solution 1 Drop(s) Both EYES two times a day  metoprolol succinate ER 50 milliGRAM(s) Oral daily  mirtazapine 7.5 milliGRAM(s) Oral at bedtime  nystatin    Suspension 372843 Unit(s) Oral four times a day  pantoprazole    Tablet 40 milliGRAM(s) Oral before breakfast  polyethylene glycol 3350 17 Gram(s) Oral daily  senna 2 Tablet(s) Oral at bedtime  simethicone 80 milliGRAM(s) Chew daily  sodium chloride 1 Gram(s) Oral three times a day  sodium chloride 3%  Inhalation 4 milliLiter(s) Inhalation every 12 hours  tamsulosin 0.4 milliGRAM(s) Oral at bedtime    MEDICATIONS  (PRN):  acetaminophen     Tablet .. 650 milliGRAM(s) Oral every 6 hours PRN Mild Pain (1 - 3)  bisacodyl 5 milliGRAM(s) Oral every 12 hours PRN Constipation  dextrose Oral Gel 15 Gram(s) Oral once PRN Blood Glucose LESS THAN 70 milliGRAM(s)/deciliter  fluticasone propionate 50 MICROgram(s)/spray Nasal Spray 1 Spray(s) Both Nostrils two times a day PRN nasal congestion      HEALTH ISSUES - PROBLEM Dx:  Acute hypercapnic respiratory failure    Sepsis with acute hypercapnic respiratory failure and septic shock, due to unspecified organism    Metabolic encephalopathy    HTN (hypertension), benign    DM2 (diabetes mellitus, type 2)    Constipation    Hyperkalemia    Need for prophylactic measure    Functional quadriplegia    Advanced care planning/counseling discussion    Encounter for palliative care    Debility    Hyponatremia

## 2023-05-26 NOTE — DISCHARGE NOTE NURSING/CASE MANAGEMENT/SOCIAL WORK - NSSCTYPOFSERV_GEN_ALL_CORE
Requested for SOC for 5/28/2023 no acceptance yet. Requested for SOC for 5/28/2023 no acceptance yet. Pending acceptance by personal touch

## 2023-05-26 NOTE — DISCHARGE NOTE NURSING/CASE MANAGEMENT/SOCIAL WORK - NSDCDMETYPESERV_GEN_ALL_CORE_FT
Respiratory Therapist to be in Home on 5/27/2023. Team will call Family with Time frame once in Home.

## 2023-05-26 NOTE — PHYSICAL THERAPY INITIAL EVALUATION ADULT - GENERAL OBSERVATIONS, REHAB EVAL
Pt found sitting in chair; +02; +telemetry monitor; 02 sat 93% on 3.5L/min via NC; family at bedside is Leandro speaking and prefers family at bedside to translate.

## 2023-05-26 NOTE — PHYSICAL THERAPY INITIAL EVALUATION ADULT - PLANNED THERAPY INTERVENTIONS, PT EVAL
Patient left sitting in chair, in NAD, call bell in reach, all lines intact./balance training/bed mobility training/gait training/strengthening/transfer training

## 2023-05-26 NOTE — PROGRESS NOTE ADULT - PROBLEM SELECTOR PLAN 2
Patient with acute metabolic encephalopathy likely iso hypercapnia and sepsis 2/2 psuedomonas pneumonia.   - Will complete 7 day abx course w/ ABx although unlikely infectious at this time  - Will continue w/ AVAPS qhs   - Monitor VBG. CO2 stable ~80 w/ metabolic compensation at this time. Patient with acute metabolic encephalopathy likely iso hypercapnia and sepsis 2/2 psuedomonas pneumonia.   - Will complete 7 day abx course w/ ABx although unlikely infectious at this time  - Will continue w/ AVAPS qhs   - Resolved w/ improved hypercarbia w/ VBG CO2 to 72

## 2023-05-26 NOTE — PROGRESS NOTE ADULT - ASSESSMENT
67 yo F PMH PCD c/b bronchiectasis w/ multiple admissions for bronchiectasis exacerbation, HTN, COVID infection 1/2022, Pseudomonas and ESBL klebsiella, chronic hypoxemic on 2-4LNC and hypercapneic respiratory failure on AVAPS referred from outpatient pulmonologist for worsening mental status and lethargy found to be severely hyponatremia. Pulmonary consulted for bronchiectasis management and hypercarbic respiratory failure    - continue empiric abx, ID recommendations appreciated to finish 10 day course  - s/p 5 day course of prednisone  - please maintain on q6 duonebs, q12 hypersal, mucomyst, and aerobika; chest PT and physical therapy  - TTE 5/18 with normal RV and LV function  - stable hypercarbia on AM blood gas  - Would continue AVAPS as needed and at night  - Palliative care input appreciated  - Patient should follow up with Dr. Young after discharge  - pulmonary will continue to follow; please email [email or web address] prior to discharge to set up outpatient follow up    David Aldrich MD  PGY-6  PCCM Fellow  Pager [phone]

## 2023-05-26 NOTE — DISCHARGE NOTE NURSING/CASE MANAGEMENT/SOCIAL WORK - PATIENT PORTAL LINK FT
You can access the FollowMyHealth Patient Portal offered by [facility] by registering at the following website: [email or web address]. By joining [facility]’s FollowMyHealth portal, you will also be able to view your health information using other applications (apps) compatible with our system.

## 2023-05-26 NOTE — PHYSICAL THERAPY INITIAL EVALUATION ADULT - PERTINENT HX OF CURRENT PROBLEM, REHAB EVAL
68 year old female with hx of primary ciliary dyskinesia, HTN, chronic constipation, bronchiectasis on 4L NC w/ frequent admissions for bronchiectasis (4/22) presents with 3 days of AMS and vomiting, found to be hypercapnia by outpatient PCP; recommending going to the ED.

## 2023-05-26 NOTE — PROGRESS NOTE ADULT - NS ATTEST RISK PROBLEM GEN_ALL_CORE FT
Acute hypoxic respiratory failure, acute hypercapnic respiratory failure, bronchiectasis
hypoxemic and hypercapnic respiratory failure, bronchiectasis exacerbation
hypoxemic and hypercapnic respiratory failure, bronchiectasis exacerbation

## 2023-05-26 NOTE — PROGRESS NOTE ADULT - PROBLEM SELECTOR PLAN 1
Pt w/ AHRF w/ hypercapnia iso acute on chronic bronchiectasis due to primary biliary dyskinesia w/ SpCx positive for pseudomonas. Pt on AVAPS at home.   - s/p meropenum and cipro and transitioned to Zosyn for total 7 day abx course per ID.   - Pulm following: c/w q6 duonebs, q12 hypersal, mucomyst, and aerobika; chest PT and physical therapy  - On prednisone 20mg. F/u pulm for duration of course  - On Avaps qhs and evening. On NC during the day.   - VBG w/ compensated hypercarbia stable w/ CO2 ~77. Prior CO2 on gas in April in mid 70's  - Pt is DNR/DNI  - Pt and family would like to continue with care and eventually take patient home once stable. Denied any ongoing GoC discussions or palliative options. Pt w/ AHRF w/ hypercapnia iso acute on chronic bronchiectasis due to primary biliary dyskinesia w/ SpCx positive for pseudomonas. Pt on AVAPS at home.   - s/p meropenum and cipro and transitioned to Zosyn for total 7 day abx course per ID.   - Pulm following: c/w q6 duonebs, q12 hypersal, mucomyst, and aerobika; chest PT and physical therapy  - On prednisone 20mg. F/u pulm for duration of course  - On Avaps qhs and evening. On NC during the day.   - VBG w/ compensated hypercarbia stable w/ improving CO2 ~72. Prior CO2 on gas in April in mid 70's  - Pt is DNR/DNI  - Pt and family would like to continue with care and eventually take patient home once stable. Denied any ongoing GoC discussions or palliative options.

## 2023-05-26 NOTE — PROGRESS NOTE ADULT - SUBJECTIVE AND OBJECTIVE BOX
CHIEF COMPLAINT: AMS, hyponatremia    Pt preferring family bedside to assist in translation    Interval Events:  Reports feeling better though with mild cough with yellow phlegm - states that she feels as though mucous is thick. Per family at bedside pt is  at mental baseline.     REVIEW OF SYSTEMS:  Constitutional: [x] negative [ ] fevers [ ] chills [ ] weight loss [ ] weight gain  HEENT: [x] negative [ ] dry eyes [ ] eye irritation [ ] postnasal drip [ ] nasal congestion  CV: [x] negative  [ ] chest pain [ ] orthopnea [ ] palpitations [ ] murmur  Resp: [ ] negative [x] cough [ ] shortness of breath [ ] dyspnea [ ] wheezing [x] sputum [ ] hemoptysis  GI: [x] negative [ ] nausea [ ] vomiting [ ] diarrhea [ ] constipation [ ] abd pain [ ] dysphagia   : [x] negative [ ] dysuria [ ] nocturia [ ] hematuria [ ] increased urinary frequency  Musculoskeletal: [x] negative [ ] back pain [ ] myalgias [ ] arthralgias [ ] fracture  Skin: [x] negative [ ] rash [ ] itch  Neurological: [x] negative [ ] headache [ ] dizziness [ ] syncope [ ] weakness [ ] numbness  Psychiatric: [x] negative [ ] anxiety [ ] depression  Endocrine: [ ] negative [ ] diabetes [ ] thyroid problem  Hematologic/Lymphatic: [ ] negative [ ] anemia [ ] bleeding problem  Allergic/Immunologic: [ ] negative [ ] itchy eyes [ ] nasal discharge [ ] hives [ ] angioedema  [x] All other systems negative  [ ] Unable to assess ROS because ________    OBJECTIVE:  ICU Vital Signs Last 24 Hrs  T(C): 37.1 (26 May 2023 12:30), Max: 37.4 (25 May 2023 21:45)  T(F): 98.7 (26 May 2023 12:30), Max: 99.3 (25 May 2023 21:45)  HR: 71 (26 May 2023 15:33) (66 - 90)  BP: 132/67 (26 May 2023 12:30) (127/62 - 132/67)  BP(mean): --  ABP: --  ABP(mean): --  RR: 18 (26 May 2023 12:30) (18 - 18)  SpO2: 98% (26 May 2023 12:30) (97% - 100%)    O2 Parameters below as of 26 May 2023 15:33  Patient On (Oxygen Delivery Method): nasal cannula          Mode: standby    05-25 @ 07:01 - 05-26 @ 07:00  --------------------------------------------------------  IN: 0 mL / OUT: 1100 mL / NET: -1100 mL    05-26 @ 07:01 - 05-26 @ 18:07  --------------------------------------------------------  IN: 0 mL / OUT: 600 mL / NET: -600 mL      CAPILLARY BLOOD GLUCOSE      POCT Blood Glucose.: 208 mg/dL (26 May 2023 16:58)      PHYSICAL EXAM:  General: Female, NAD  HEENT: EOMI, PERRL  Neck: Supple  Respiratory: Bilateral coarse rales, no wheezes or increased WOB  Cardiovascular: RRR no mrg  Abdomen: Soft, NT, ND, no rebound or guarding  Extremities: WWP, no edema  Skin: Intact, no rashes  Neurological: AOx3  Psychiatry: Normal affect    HOSPITAL MEDICATIONS:  MEDICATIONS  (STANDING):  acetylcysteine 10%  Inhalation 4 milliLiter(s) Inhalation daily  albuterol/ipratropium for Nebulization 3 milliLiter(s) Nebulizer every 6 hours  amLODIPine   Tablet 5 milliGRAM(s) Oral daily  budesonide 160 MICROgram(s)/formoterol 4.5 MICROgram(s) Inhaler 2 Puff(s) Inhalation two times a day  dextrose 5%. 1000 milliLiter(s) (100 mL/Hr) IV Continuous <Continuous>  dextrose 5%. 1000 milliLiter(s) (50 mL/Hr) IV Continuous <Continuous>  dextrose 50% Injectable 25 Gram(s) IV Push once  dextrose 50% Injectable 12.5 Gram(s) IV Push once  dextrose 50% Injectable 25 Gram(s) IV Push once  enoxaparin Injectable 30 milliGRAM(s) SubCutaneous every 24 hours  fluticasone propionate 50 MICROgram(s)/spray Nasal Spray 1 Spray(s) Both Nostrils two times a day  glucagon  Injectable 1 milliGRAM(s) IntraMuscular once  insulin glargine Injectable (LANTUS) 4 Unit(s) SubCutaneous at bedtime  insulin lispro (ADMELOG) corrective regimen sliding scale   SubCutaneous Before meals and at bedtime  insulin lispro Injectable (ADMELOG) 2 Unit(s) SubCutaneous three times a day before meals  ketotifen 0.025% Ophthalmic Solution 1 Drop(s) Both EYES two times a day  metoprolol succinate ER 50 milliGRAM(s) Oral daily  mirtazapine 7.5 milliGRAM(s) Oral at bedtime  nystatin    Suspension 407539 Unit(s) Oral four times a day  pantoprazole    Tablet 40 milliGRAM(s) Oral before breakfast  piperacillin/tazobactam IVPB.. 3.375 Gram(s) IV Intermittent every 8 hours  polyethylene glycol 3350 17 Gram(s) Oral daily  senna 2 Tablet(s) Oral at bedtime  simethicone 80 milliGRAM(s) Chew daily  sodium chloride 1 Gram(s) Oral three times a day  sodium chloride 3%  Inhalation 4 milliLiter(s) Inhalation every 12 hours  tamsulosin 0.4 milliGRAM(s) Oral at bedtime    MEDICATIONS  (PRN):  acetaminophen     Tablet .. 650 milliGRAM(s) Oral every 6 hours PRN Mild Pain (1 - 3)  bisacodyl 5 milliGRAM(s) Oral every 12 hours PRN Constipation  dextrose Oral Gel 15 Gram(s) Oral once PRN Blood Glucose LESS THAN 70 milliGRAM(s)/deciliter  sodium chloride 0.65% Nasal 1 Spray(s) Both Nostrils three times a day PRN Nasal Congestion      LABS:                        9.9    6.88  )-----------( 173      ( 26 May 2023 06:33 )             33.1     Hgb Trend: 9.9<--, 9.7<--, 9.9<--, 10.3<--, 10.8<--  05-26    139  |  94<L>  |  20  ----------------------------<  196<H>  4.4   |  36<H>  |  0.45<L>    Ca    9.5      26 May 2023 06:33  Phos  4.0     05-26  Mg     2.10     05-26      Creatinine Trend: 0.45<--, 0.43<--, 0.38<--, 0.32<--, 0.35<--, 0.36<--        Venous Blood Gas:  05-26 @ 06:33  7.37/72/144/42/98.9  VBG Lactate: 1.1  Venous Blood Gas:  05-25 @ 06:17  7.39/77/127/47/98.9  VBG Lactate: 0.8      MICROBIOLOGY:       RADIOLOGY:  [ ] Reviewed and interpreted by me    PULMONARY FUNCTION TESTS:    EKG:

## 2023-05-26 NOTE — PROGRESS NOTE ADULT - PROBLEM SELECTOR PLAN 8
Diet: CC/DASH no beef/pork  Psych/Sleep: None FRANCES  GI: Protonix  DVT ppx: Lovenox 40mg   Code Status: Pending further discussion and palliative consult  Dispo: Pending resolution of hypercapnia will go home.

## 2023-05-26 NOTE — PHYSICAL THERAPY INITIAL EVALUATION ADULT - SITTING BALANCE: DYNAMIC
"Encounter Date: 12/27/2018    SORT:  44 y.o. female presenting to ED for SOB. Limited triage exam:  Appears ill. If orders were placed, they are pending.     Isael Astorga PA-C  12/27/2018  9:06 PM   SCRIBE #1 NOTE: I, Dale Morfin II, am scribing for, and in the presence of,  Susie Ruelas MD. I have scribed the following portions of the note - Other sections scribed: HPI, ROS, PE.       History     Chief Complaint   Patient presents with    Shortness of Breath     Pt reports to ED wiht complaints of SOB and generalized body aches. Pt has hx of COPD, and CHF.     CC: SOB     HPI: This 44 y.o. female presents to the ED c/o acute onset, SOB with myalgias and weakness x1 day. Pt reports she has hx of CHF and she feels as if she has fluid overload in her lungs. She reports similar episodes in the past consistent with CHF. She reports she has a defibrillator in place since April and denies any sensation. She states she had a defibrillator placed because "my heart was at 20%".  Pt denies any chest pain. She reports she uses a machine to sleep at night. She reports she took her rx Lasix and denies leg swelling. No alleviating factors. SOB is exacerbated with exertion. Pt denies nausea, dysuria, diaphoresis, and numbness.        The history is provided by the patient. No  was used.     Review of patient's allergies indicates:   Allergen Reactions    Pneumococcal 23-abimbola ps vaccine      Past Medical History:   Diagnosis Date    Atrial fibrillation     Blood clot associated with vein wall inflammation     not dvt    Cardiomyopathy     Normal cors on cath 11/2017    CHF (congestive heart failure)     DM (diabetes mellitus) 9/19/2013    Hyperlipidemia     Hypertension     Psoriasis     Sleep apnea      Past Surgical History:   Procedure Laterality Date    CARDIAC CATHETERIZATION      COLONOSCOPY      DILATION AND CURETTAGE OF UTERUS      INSERTION-ICD-DUAL Left 4/17/2018    Performed " by Eben Christine MD at [facility]     Family History   Problem Relation Age of Onset    Hypertension Mother     Hypertension Father     Diabetes Father     Diabetes Maternal Grandmother     Diabetes Paternal Grandmother     Breast cancer Neg Hx     Colon cancer Neg Hx     Ovarian cancer Neg Hx      Social History     Tobacco Use    Smoking status: Never Smoker    Smokeless tobacco: Never Used    Tobacco comment: smokes cigars on occasion   Substance Use Topics    Alcohol use: Yes     Comment: occasional    Drug use: No     Comment: hx of marijuana use 3 years ago     Review of Systems   Constitutional: Negative for chills and fever.   HENT: Negative for congestion, ear pain, rhinorrhea and sore throat.    Eyes: Negative for pain and visual disturbance.   Respiratory: Positive for shortness of breath. Negative for cough.    Cardiovascular: Negative for chest pain.   Gastrointestinal: Negative for abdominal pain, diarrhea, nausea and vomiting.   Genitourinary: Negative for dysuria.   Musculoskeletal: Positive for myalgias. Negative for back pain and neck pain.   Skin: Negative for rash.   Neurological: Positive for weakness. Negative for headaches.       Physical Exam     Initial Vitals [12/27/18 2105]   BP Pulse Resp Temp SpO2   (!) 162/118 (!) 113 (!) 26 98 °F (36.7 °C) (!) 90 %      MAP       --         Physical Exam    Nursing note and vitals reviewed.  Constitutional: She appears well-developed and well-nourished. She is cooperative.  Non-toxic appearance. No distress.   HENT:   Head: Normocephalic and atraumatic.   Mouth/Throat: Oropharynx is clear and moist.   Nasal canula in place with evidence of SOB   Eyes: Conjunctivae and EOM are normal. Pupils are equal, round, and reactive to light.   Neck: Normal range of motion and full passive range of motion without pain. Neck supple. No thyromegaly present. No JVD present.   Cardiovascular: Normal rate, regular rhythm, normal heart sounds and  normal pulses.   Pulmonary/Chest: Effort normal. No respiratory distress.   Decreased breath sounds bilaterally   Abdominal: Soft. Normal appearance and bowel sounds are normal. She exhibits no distension and no mass. There is no tenderness.   Musculoskeletal: Normal range of motion.   Neurological: She is alert and oriented to person, place, and time. She has normal strength. No cranial nerve deficit or sensory deficit.   Skin: Skin is warm, dry and intact. No rash noted.   Psychiatric: She has a normal mood and affect. Her speech is normal and behavior is normal. Judgment and thought content normal.         ED Course   Procedures  Labs Reviewed   CBC W/ AUTO DIFFERENTIAL   COMPREHENSIVE METABOLIC PANEL   TROPONIN I   B-TYPE NATRIURETIC PEPTIDE   PROTIME-INR   MAGNESIUM   POCT GLUCOSE MONITORING CONTINUOUS     EKG Readings: (Independently Interpreted)   Initial Reading: No STEMI. Rhythm: Sinus Tachycardia.       Imaging Results    None          Medical Decision Making:   Initial Assessment:   44-year-old female who comes to the emergency department for evaluation of shortness of breath.  Differential Diagnosis:   Differential diagnosis include but not limited to...  Congestive heart failure  ACS  Pulmonary edema  Pneumonia  Pleural effusion    ED Management:  Upon initial evaluation physical examination the patient was alert and oriented x3 Scotty 2 L nasal cannula and showing signs of shortness of breath. The patient was able to talk in complete sentences.  Patient reports that for the last couple of days even though she has been taking her diuretic pills she has not been urinating as she usually does.  Patient reports that her weakness has gotten significantly worse and that is why she decided to come to the emergency department.  I explained to the patient after my physical examination that I was concern for fluid overload.  Discussed in detail the laboratory and x-ray results with the patient.  I informed her  that her x-rays show pulmonary edema with small pleural effusions bilaterally. I discussed with the patient the need for inpatient treatment; she verbalized understanding and agreement with this treatment plan.    ED Course:    10:35 Spoke with hospitalist, Dr. Olvera, about admitting the patient.            Scribe Attestation:   Scribe #1: I performed the above scribed service and the documentation accurately describes the services I performed. I attest to the accuracy of the note.    Attending Attestation:           Physician Attestation for Scribe:  Physician Attestation Statement for Scribe #1: I, Susie Ruelas MD, reviewed documentation, as scribed by Dale Morfin II in my presence, and it is both accurate and complete.                    Clinical Impression:   The primary encounter diagnosis was Congestive heart failure, unspecified HF chronicity, unspecified heart failure type. A diagnosis of SOB (shortness of breath) was also pertinent to this visit.                             Susie Ruelas MD  12/28/18 1314     good minus

## 2023-05-26 NOTE — PROGRESS NOTE ADULT - ATTENDING COMMENTS
68F Primary Ciliary Dyskinesia with bronchiectasis – Home O2/AVAPS, HTN, p/w acute on chronic hypoxic hypercarbic respiratory failure from Pseudomonas PNA c/b acute encephalopathy, Hyponatremia, steroid induced DM2.    Acute on chronic hypercarbic respiratory failure from chronic bronchiectasis:  - Continue AVAPS on at night and PRN during the daytime  - will arrange to continue AVAPS for home  - poor prognosis due to advancing pulmonary dysfunction  - Appreciate Palliative care consult - patient and family not amendable for hospice placement or comfort care.  - Meropenem IV, Cipro PO to Zosyn IV until 5/27 - can be changed to PO meds on discharge - appreciate ID recs  DM type 2 - steroid induced w/ hyperglycemia:  - Hyperglycemia and hypoglycemia - labile FS; pre-meal admelog and lantus  - patient had refused lantus on 5/25.  Disposition:  - d/c to home - CM aware for home care set up  - discussed with family by the bedside for 10 minutes on 5/26. Answered all the questions.

## 2023-05-27 PROCEDURE — 99232 SBSQ HOSP IP/OBS MODERATE 35: CPT | Mod: GC

## 2023-05-27 RX ORDER — ALBUTEROL 90 UG/1
0 AEROSOL, METERED ORAL
Qty: 0 | Refills: 5 | DISCHARGE

## 2023-05-27 RX ORDER — BUDESONIDE AND FORMOTEROL FUMARATE DIHYDRATE 160; 4.5 UG/1; UG/1
2 AEROSOL RESPIRATORY (INHALATION)
Qty: 0 | Refills: 0 | DISCHARGE

## 2023-05-27 RX ORDER — ALBUTEROL 90 UG/1
1 AEROSOL, METERED ORAL
Qty: 500 | Refills: 0
Start: 2023-05-27

## 2023-05-27 RX ADMIN — PANTOPRAZOLE SODIUM 40 MILLIGRAM(S): 20 TABLET, DELAYED RELEASE ORAL at 06:11

## 2023-05-27 RX ADMIN — Medication 50 MILLIGRAM(S): at 06:11

## 2023-05-27 RX ADMIN — Medication 3 MILLILITER(S): at 22:17

## 2023-05-27 RX ADMIN — SODIUM CHLORIDE 1 GRAM(S): 9 INJECTION INTRAMUSCULAR; INTRAVENOUS; SUBCUTANEOUS at 06:10

## 2023-05-27 RX ADMIN — PIPERACILLIN AND TAZOBACTAM 25 GRAM(S): 4; .5 INJECTION, POWDER, LYOPHILIZED, FOR SOLUTION INTRAVENOUS at 21:54

## 2023-05-27 RX ADMIN — TAMSULOSIN HYDROCHLORIDE 0.4 MILLIGRAM(S): 0.4 CAPSULE ORAL at 21:53

## 2023-05-27 RX ADMIN — Medication 1 SPRAY(S): at 17:45

## 2023-05-27 RX ADMIN — SODIUM CHLORIDE 4 MILLILITER(S): 9 INJECTION INTRAMUSCULAR; INTRAVENOUS; SUBCUTANEOUS at 22:17

## 2023-05-27 RX ADMIN — BUDESONIDE AND FORMOTEROL FUMARATE DIHYDRATE 2 PUFF(S): 160; 4.5 AEROSOL RESPIRATORY (INHALATION) at 08:45

## 2023-05-27 RX ADMIN — SODIUM CHLORIDE 1 GRAM(S): 9 INJECTION INTRAMUSCULAR; INTRAVENOUS; SUBCUTANEOUS at 12:56

## 2023-05-27 RX ADMIN — SIMETHICONE 80 MILLIGRAM(S): 80 TABLET, CHEWABLE ORAL at 12:44

## 2023-05-27 RX ADMIN — BUDESONIDE AND FORMOTEROL FUMARATE DIHYDRATE 2 PUFF(S): 160; 4.5 AEROSOL RESPIRATORY (INHALATION) at 21:57

## 2023-05-27 RX ADMIN — KETOTIFEN FUMARATE 1 DROP(S): 0.34 SOLUTION OPHTHALMIC at 06:12

## 2023-05-27 RX ADMIN — KETOTIFEN FUMARATE 1 DROP(S): 0.34 SOLUTION OPHTHALMIC at 17:44

## 2023-05-27 RX ADMIN — Medication 2 UNIT(S): at 17:46

## 2023-05-27 RX ADMIN — SODIUM CHLORIDE 4 MILLILITER(S): 9 INJECTION INTRAMUSCULAR; INTRAVENOUS; SUBCUTANEOUS at 08:32

## 2023-05-27 RX ADMIN — PIPERACILLIN AND TAZOBACTAM 25 GRAM(S): 4; .5 INJECTION, POWDER, LYOPHILIZED, FOR SOLUTION INTRAVENOUS at 06:09

## 2023-05-27 RX ADMIN — Medication 1: at 08:44

## 2023-05-27 RX ADMIN — Medication 3 MILLILITER(S): at 04:11

## 2023-05-27 RX ADMIN — AMLODIPINE BESYLATE 5 MILLIGRAM(S): 2.5 TABLET ORAL at 06:11

## 2023-05-27 RX ADMIN — Medication 500000 UNIT(S): at 23:18

## 2023-05-27 RX ADMIN — POLYETHYLENE GLYCOL 3350 17 GRAM(S): 17 POWDER, FOR SOLUTION ORAL at 12:43

## 2023-05-27 RX ADMIN — Medication 3 MILLILITER(S): at 15:36

## 2023-05-27 RX ADMIN — Medication 2 UNIT(S): at 08:45

## 2023-05-27 RX ADMIN — Medication 4 MILLILITER(S): at 08:31

## 2023-05-27 RX ADMIN — Medication 500000 UNIT(S): at 06:10

## 2023-05-27 RX ADMIN — PIPERACILLIN AND TAZOBACTAM 25 GRAM(S): 4; .5 INJECTION, POWDER, LYOPHILIZED, FOR SOLUTION INTRAVENOUS at 12:44

## 2023-05-27 RX ADMIN — SENNA PLUS 2 TABLET(S): 8.6 TABLET ORAL at 21:53

## 2023-05-27 RX ADMIN — Medication 1: at 12:44

## 2023-05-27 RX ADMIN — SODIUM CHLORIDE 1 GRAM(S): 9 INJECTION INTRAMUSCULAR; INTRAVENOUS; SUBCUTANEOUS at 21:53

## 2023-05-27 RX ADMIN — ENOXAPARIN SODIUM 30 MILLIGRAM(S): 100 INJECTION SUBCUTANEOUS at 06:12

## 2023-05-27 RX ADMIN — Medication 500000 UNIT(S): at 12:44

## 2023-05-27 RX ADMIN — INSULIN GLARGINE 4 UNIT(S): 100 INJECTION, SOLUTION SUBCUTANEOUS at 23:15

## 2023-05-27 RX ADMIN — Medication 2 UNIT(S): at 12:44

## 2023-05-27 RX ADMIN — Medication 3: at 23:15

## 2023-05-27 RX ADMIN — Medication 1 SPRAY(S): at 06:13

## 2023-05-27 RX ADMIN — MIRTAZAPINE 7.5 MILLIGRAM(S): 45 TABLET, ORALLY DISINTEGRATING ORAL at 21:53

## 2023-05-27 RX ADMIN — Medication 3 MILLILITER(S): at 08:32

## 2023-05-27 RX ADMIN — Medication 2: at 17:45

## 2023-05-27 RX ADMIN — Medication 500000 UNIT(S): at 17:44

## 2023-05-27 NOTE — PROGRESS NOTE ADULT - PROBLEM SELECTOR PLAN 8
Diet: CC/DASH no beef/pork  Psych/Sleep: None FRANCES  GI: Protonix  DVT ppx: Lovenox 40mg   Code Status: Pending further discussion and palliative consult  Dispo: DC to home w/ home aid today on nocturnal AVAPs

## 2023-05-27 NOTE — PROGRESS NOTE ADULT - PROBLEM SELECTOR PLAN 2
Patient with acute metabolic encephalopathy likely iso hypercapnia and sepsis 2/2 psuedomonas pneumonia.   - Will complete 7 day abx course w/ ABx although unlikely infectious at this time  - Will continue w/ AVAPS qhs   - Resolved w/ improved hypercarbia w/ VBG CO2 to 72

## 2023-05-27 NOTE — PROGRESS NOTE ADULT - ATTENDING COMMENTS
68F Primary Ciliary Dyskinesia with bronchiectasis – Home O2/AVAPS, HTN, p/w acute on chronic hypoxic hypercarbic respiratory failure from Pseudomonas PNA c/b acute encephalopathy, Hyponatremia, steroid induced DM2.    Acute on chronic hypercarbic respiratory failure from chronic bronchiectasis:  - Continue AVAPS on at night and PRN during the daytime  - will arrange to continue AVAPS for home  - poor prognosis due to advancing pulmonary dysfunction  - Appreciate Palliative care consult - patient and family not amendable for hospice placement or comfort care.  - Meropenem IV, Cipro PO to Zosyn IV until 5/27 - appreciate ID recs    DM type 2 - steroid induced w/ hyperglycemia:  - Hyperglycemia and hypoglycemia - labile FS; pre-meal admelog and lantus  - patient had refused lantus on 5/25.    Disposition:  - d/c to home - CM aware for home care set up - awaiting setting up AVAPS at home.

## 2023-05-27 NOTE — PROGRESS NOTE ADULT - SUBJECTIVE AND OBJECTIVE BOX
[facility], PGY1    GRACIE BHAKTA  68y  Female    Complaints:  Subjective:     No acute o/n events. Pt denies subj fevers/chills, HA, dizziness, chest pain, palpitations, n/v, abd pain, dysuria, diarrhea      FAMILY HISTORY:  Family history of diabetes mellitus  father    Family history of allergic rhinitis (Child, Child)  son, daughter    Family history of bronchitis  mother, father      68yVital Signs Last 24 Hrs  T(C): 37.1 (27 May 2023 05:50), Max: 37.1 (26 May 2023 12:30)  T(F): 98.7 (27 May 2023 05:50), Max: 98.7 (26 May 2023 12:30)  HR: 80 (27 May 2023 05:50) (71 - 90)  BP: 144/63 (26 May 2023 20:25) (132/67 - 144/63)  BP(mean): --  RR: 18 (26 May 2023 20:25) (18 - 18)  SpO2: 98% (26 May 2023 20:25) (98% - 99%)    Parameters below as of 26 May 2023 20:25  Patient On (Oxygen Delivery Method): nasal cannula  O2 Flow (L/min): 3        PHYSICAL EXAM:  GENERAL: NAD, well-groomed, well-developed  HEAD:  Atraumatic, Normocephalic  EYES: EOMI, PERRLA, conjunctiva and sclera clear  ENMT: No tonsillar erythema, exudates, or enlargement; Moist mucous membranes, Good dentition, No lesions  NECK: Supple, No JVD, Normal thyroid  NERVOUS SYSTEM:  Alert & Oriented X3, Good concentration; Motor Strength 5/5 B/L upper and lower extremities; DTRs 2+ intact and symmetric  CHEST/LUNG: Clear to percussion bilaterally; No rales, rhonchi, wheezing, or rubs  HEART: Regular rate and rhythm; No murmurs, rubs, or gallops  ABDOMEN: Soft, Nontender, Nondistended; Bowel sounds present  EXTREMITIES:  2+ Peripheral Pulses, No clubbing, cyanosis, or edema  LYMPH: No lymphadenopathy noted  SKIN: No rashes or lesions      Consultant(s) Notes Reviewed:  [x ] YES  [ ] NO  Care Discussed with Consultants/Other Providers [ x] YES  [ ] NO    LABS:                Female  RADIOLOGY & ADDITIONAL TESTS:    Imaging Personally Reviewed:  [ ] YES  [ ] NO    MedsMEDICATIONS  (STANDING):  acetylcysteine 10%  Inhalation 4 milliLiter(s) Inhalation daily  albuterol/ipratropium for Nebulization 3 milliLiter(s) Nebulizer every 6 hours  amLODIPine   Tablet 5 milliGRAM(s) Oral daily  budesonide 160 MICROgram(s)/formoterol 4.5 MICROgram(s) Inhaler 2 Puff(s) Inhalation two times a day  dextrose 5%. 1000 milliLiter(s) (100 mL/Hr) IV Continuous <Continuous>  dextrose 5%. 1000 milliLiter(s) (50 mL/Hr) IV Continuous <Continuous>  dextrose 50% Injectable 12.5 Gram(s) IV Push once  dextrose 50% Injectable 25 Gram(s) IV Push once  dextrose 50% Injectable 25 Gram(s) IV Push once  enoxaparin Injectable 30 milliGRAM(s) SubCutaneous every 24 hours  fluticasone propionate 50 MICROgram(s)/spray Nasal Spray 1 Spray(s) Both Nostrils two times a day  glucagon  Injectable 1 milliGRAM(s) IntraMuscular once  insulin glargine Injectable (LANTUS) 4 Unit(s) SubCutaneous at bedtime  insulin lispro (ADMELOG) corrective regimen sliding scale   SubCutaneous Before meals and at bedtime  insulin lispro Injectable (ADMELOG) 2 Unit(s) SubCutaneous three times a day before meals  ketotifen 0.025% Ophthalmic Solution 1 Drop(s) Both EYES two times a day  metoprolol succinate ER 50 milliGRAM(s) Oral daily  mirtazapine 7.5 milliGRAM(s) Oral at bedtime  nystatin    Suspension 339948 Unit(s) Oral four times a day  pantoprazole    Tablet 40 milliGRAM(s) Oral before breakfast  piperacillin/tazobactam IVPB.. 3.375 Gram(s) IV Intermittent every 8 hours  polyethylene glycol 3350 17 Gram(s) Oral daily  senna 2 Tablet(s) Oral at bedtime  simethicone 80 milliGRAM(s) Chew daily  sodium chloride 1 Gram(s) Oral three times a day  sodium chloride 3%  Inhalation 4 milliLiter(s) Inhalation every 12 hours  tamsulosin 0.4 milliGRAM(s) Oral at bedtime    MEDICATIONS  (PRN):  acetaminophen     Tablet .. 650 milliGRAM(s) Oral every 6 hours PRN Mild Pain (1 - 3)  bisacodyl 5 milliGRAM(s) Oral every 12 hours PRN Constipation  dextrose Oral Gel 15 Gram(s) Oral once PRN Blood Glucose LESS THAN 70 milliGRAM(s)/deciliter  sodium chloride 0.65% Nasal 1 Spray(s) Both Nostrils three times a day PRN Nasal Congestion      HEALTH ISSUES - PROBLEM Dx:  Acute hypercapnic respiratory failure    Sepsis with acute hypercapnic respiratory failure and septic shock, due to unspecified organism    Metabolic encephalopathy    HTN (hypertension), benign    DM2 (diabetes mellitus, type 2)    Constipation    Hyperkalemia    Need for prophylactic measure    Functional quadriplegia    Advanced care planning/counseling discussion    Encounter for palliative care    Debility    Hyponatremia

## 2023-05-27 NOTE — PROGRESS NOTE ADULT - PROBLEM SELECTOR PLAN 1
Pt w/ AHRF w/ hypercapnia iso acute on chronic bronchiectasis due to primary biliary dyskinesia w/ SpCx positive for pseudomonas. Pt on AVAPS at home.   - s/p meropenum and cipro and transitioned to Zosyn for total 7 day abx course per ID.   - Pulm following: c/w q6 duonebs, q12 hypersal, mucomyst, and aerobika; chest PT and physical therapy  - On prednisone 20mg. F/u pulm for duration of course  - On Avaps qhs and evening. On NC during the day.   - VBG w/ compensated hypercarbia stable w/ improving CO2 ~72. Prior CO2 on gas in April in mid 70's  - Pt is DNR/DNI  - Pt and family would like to continue with care and eventually take patient home once stable. Denied any ongoing [facility] discussions or palliative options.

## 2023-05-28 LAB
ANION GAP SERPL CALC-SCNC: 8 MMOL/L — SIGNIFICANT CHANGE UP (ref 7–14)
BLOOD GAS VENOUS COMPREHENSIVE RESULT: SIGNIFICANT CHANGE UP
BUN SERPL-MCNC: 19 MG/DL — SIGNIFICANT CHANGE UP (ref 7–23)
CALCIUM SERPL-MCNC: 9.5 MG/DL — SIGNIFICANT CHANGE UP (ref 8.4–10.5)
CHLORIDE SERPL-SCNC: 97 MMOL/L — LOW (ref 98–107)
CO2 SERPL-SCNC: 34 MMOL/L — HIGH (ref 22–31)
CREAT SERPL-MCNC: 0.4 MG/DL — LOW (ref 0.5–1.3)
EGFR: 109 ML/MIN/1.73M2 — SIGNIFICANT CHANGE UP
GLUCOSE SERPL-MCNC: 179 MG/DL — HIGH (ref 70–99)
HCT VFR BLD CALC: 35.5 % — SIGNIFICANT CHANGE UP (ref 34.5–45)
HGB BLD-MCNC: 10.6 G/DL — LOW (ref 11.5–15.5)
MAGNESIUM SERPL-MCNC: 2.3 MG/DL — SIGNIFICANT CHANGE UP (ref 1.6–2.6)
MCHC RBC-ENTMCNC: 26.6 PG — LOW (ref 27–34)
MCHC RBC-ENTMCNC: 29.9 GM/DL — LOW (ref 32–36)
MCV RBC AUTO: 89.2 FL — SIGNIFICANT CHANGE UP (ref 80–100)
NRBC # BLD: 0 /100 WBCS — SIGNIFICANT CHANGE UP (ref 0–0)
NRBC # FLD: 0 K/UL — SIGNIFICANT CHANGE UP (ref 0–0)
PHOSPHATE SERPL-MCNC: 3.4 MG/DL — SIGNIFICANT CHANGE UP (ref 2.5–4.5)
PLATELET # BLD AUTO: 165 K/UL — SIGNIFICANT CHANGE UP (ref 150–400)
POTASSIUM SERPL-MCNC: 4.2 MMOL/L — SIGNIFICANT CHANGE UP (ref 3.5–5.3)
POTASSIUM SERPL-SCNC: 4.2 MMOL/L — SIGNIFICANT CHANGE UP (ref 3.5–5.3)
RBC # BLD: 3.98 M/UL — SIGNIFICANT CHANGE UP (ref 3.8–5.2)
RBC # FLD: 13.5 % — SIGNIFICANT CHANGE UP (ref 10.3–14.5)
SODIUM SERPL-SCNC: 139 MMOL/L — SIGNIFICANT CHANGE UP (ref 135–145)
WBC # BLD: 10.22 K/UL — SIGNIFICANT CHANGE UP (ref 3.8–10.5)
WBC # FLD AUTO: 10.22 K/UL — SIGNIFICANT CHANGE UP (ref 3.8–10.5)

## 2023-05-28 PROCEDURE — 99232 SBSQ HOSP IP/OBS MODERATE 35: CPT | Mod: GC

## 2023-05-28 RX ORDER — INSULIN GLARGINE 100 [IU]/ML
6 INJECTION, SOLUTION SUBCUTANEOUS AT BEDTIME
Refills: 0 | Status: DISCONTINUED | OUTPATIENT
Start: 2023-05-28 | End: 2023-05-29

## 2023-05-28 RX ORDER — SIMETHICONE 80 MG/1
80 TABLET, CHEWABLE ORAL ONCE
Refills: 0 | Status: COMPLETED | OUTPATIENT
Start: 2023-05-28 | End: 2023-05-28

## 2023-05-28 RX ORDER — INSULIN LISPRO 100/ML
3 VIAL (ML) SUBCUTANEOUS
Refills: 0 | Status: DISCONTINUED | OUTPATIENT
Start: 2023-05-28 | End: 2023-05-29

## 2023-05-28 RX ADMIN — TAMSULOSIN HYDROCHLORIDE 0.4 MILLIGRAM(S): 0.4 CAPSULE ORAL at 23:02

## 2023-05-28 RX ADMIN — SODIUM CHLORIDE 1 GRAM(S): 9 INJECTION INTRAMUSCULAR; INTRAVENOUS; SUBCUTANEOUS at 13:54

## 2023-05-28 RX ADMIN — Medication 1 SPRAY(S): at 06:03

## 2023-05-28 RX ADMIN — Medication 500000 UNIT(S): at 18:05

## 2023-05-28 RX ADMIN — Medication 2: at 08:48

## 2023-05-28 RX ADMIN — Medication 50 MILLIGRAM(S): at 05:55

## 2023-05-28 RX ADMIN — Medication 3 MILLILITER(S): at 20:59

## 2023-05-28 RX ADMIN — Medication 3 MILLILITER(S): at 04:00

## 2023-05-28 RX ADMIN — Medication 2: at 12:34

## 2023-05-28 RX ADMIN — SODIUM CHLORIDE 1 GRAM(S): 9 INJECTION INTRAMUSCULAR; INTRAVENOUS; SUBCUTANEOUS at 05:55

## 2023-05-28 RX ADMIN — SODIUM CHLORIDE 1 GRAM(S): 9 INJECTION INTRAMUSCULAR; INTRAVENOUS; SUBCUTANEOUS at 23:02

## 2023-05-28 RX ADMIN — Medication 2 UNIT(S): at 08:49

## 2023-05-28 RX ADMIN — SENNA PLUS 2 TABLET(S): 8.6 TABLET ORAL at 23:02

## 2023-05-28 RX ADMIN — INSULIN GLARGINE 6 UNIT(S): 100 INJECTION, SOLUTION SUBCUTANEOUS at 23:03

## 2023-05-28 RX ADMIN — Medication 3 MILLILITER(S): at 07:40

## 2023-05-28 RX ADMIN — SIMETHICONE 80 MILLIGRAM(S): 80 TABLET, CHEWABLE ORAL at 11:02

## 2023-05-28 RX ADMIN — Medication 2: at 23:09

## 2023-05-28 RX ADMIN — PIPERACILLIN AND TAZOBACTAM 25 GRAM(S): 4; .5 INJECTION, POWDER, LYOPHILIZED, FOR SOLUTION INTRAVENOUS at 11:02

## 2023-05-28 RX ADMIN — MIRTAZAPINE 7.5 MILLIGRAM(S): 45 TABLET, ORALLY DISINTEGRATING ORAL at 23:07

## 2023-05-28 RX ADMIN — ENOXAPARIN SODIUM 30 MILLIGRAM(S): 100 INJECTION SUBCUTANEOUS at 05:55

## 2023-05-28 RX ADMIN — Medication 2 UNIT(S): at 12:35

## 2023-05-28 RX ADMIN — AMLODIPINE BESYLATE 5 MILLIGRAM(S): 2.5 TABLET ORAL at 05:54

## 2023-05-28 RX ADMIN — Medication 500000 UNIT(S): at 23:10

## 2023-05-28 RX ADMIN — Medication 3 UNIT(S): at 18:06

## 2023-05-28 RX ADMIN — BUDESONIDE AND FORMOTEROL FUMARATE DIHYDRATE 2 PUFF(S): 160; 4.5 AEROSOL RESPIRATORY (INHALATION) at 23:05

## 2023-05-28 RX ADMIN — KETOTIFEN FUMARATE 1 DROP(S): 0.34 SOLUTION OPHTHALMIC at 18:06

## 2023-05-28 RX ADMIN — Medication 1 SPRAY(S): at 18:05

## 2023-05-28 RX ADMIN — BUDESONIDE AND FORMOTEROL FUMARATE DIHYDRATE 2 PUFF(S): 160; 4.5 AEROSOL RESPIRATORY (INHALATION) at 07:42

## 2023-05-28 RX ADMIN — Medication 3 MILLILITER(S): at 14:46

## 2023-05-28 RX ADMIN — PIPERACILLIN AND TAZOBACTAM 25 GRAM(S): 4; .5 INJECTION, POWDER, LYOPHILIZED, FOR SOLUTION INTRAVENOUS at 05:53

## 2023-05-28 RX ADMIN — SODIUM CHLORIDE 4 MILLILITER(S): 9 INJECTION INTRAMUSCULAR; INTRAVENOUS; SUBCUTANEOUS at 20:59

## 2023-05-28 RX ADMIN — Medication 500000 UNIT(S): at 05:54

## 2023-05-28 RX ADMIN — Medication 4 MILLILITER(S): at 07:40

## 2023-05-28 RX ADMIN — Medication 500000 UNIT(S): at 11:02

## 2023-05-28 RX ADMIN — KETOTIFEN FUMARATE 1 DROP(S): 0.34 SOLUTION OPHTHALMIC at 06:04

## 2023-05-28 RX ADMIN — PANTOPRAZOLE SODIUM 40 MILLIGRAM(S): 20 TABLET, DELAYED RELEASE ORAL at 05:54

## 2023-05-28 NOTE — PROGRESS NOTE ADULT - ATTENDING COMMENTS
68F Primary Ciliary Dyskinesia with bronchiectasis – Home O2/AVAPS, HTN, p/w acute on chronic hypoxic hypercarbic respiratory failure from Pseudomonas PNA c/b acute encephalopathy, Hyponatremia, steroid induced DM2.    Acute on chronic hypercarbic respiratory failure from chronic bronchiectasis:  - Continue AVAPS on at night and PRN during the daytime  - will arrange to continue AVAPS for home  - poor prognosis due to advancing pulmonary dysfunction  - Appreciate Palliative care consult - patient and family not amendable for hospice placement or comfort care.  - s/p a course of Meropenem IV, Cipro PO to Zosyn IV - appreciate ID recs    DM type 2 - steroid induced w/ hyperglycemia:  - Hyperglycemia and hypoglycemia - labile FS; pre-meal admelog and lantus  - continue to monitor FS    Disposition:  - d/c to home - CM aware for home care set up - awaiting setting up AVAPS at home.

## 2023-05-28 NOTE — PROGRESS NOTE ADULT - SUBJECTIVE AND OBJECTIVE BOX
SUBJECTIVE / OVERNIGHT EVENTS:    CAPILLARY BLOOD GLUCOSE  POCT Blood Glucose.: 276 mg/dL (27 May 2023 22:36)  POCT Blood Glucose.: 201 mg/dL (27 May 2023 17:08)  POCT Blood Glucose.: 174 mg/dL (27 May 2023 12:10)  POCT Blood Glucose.: 182 mg/dL (27 May 2023 08:32)    acetylcysteine 10%  Inhalation   4 milliLiter(s) Inhalation (05-27-23 @ 08:31)    albuterol/ipratropium for Nebulization   3 milliLiter(s) Nebulizer (05-28-23 @ 04:00)   3 milliLiter(s) Nebulizer (05-27-23 @ 22:17)   3 milliLiter(s) Nebulizer (05-27-23 @ 15:36)   3 milliLiter(s) Nebulizer (05-27-23 @ 08:32)    amLODIPine   Tablet   5 milliGRAM(s) Oral (05-28-23 @ 05:54)    budesonide 160 MICROgram(s)/formoterol 4.5 MICROgram(s) Inhaler   2 Puff(s) Inhalation (05-27-23 @ 21:57)   2 Puff(s) Inhalation (05-27-23 @ 08:45)    enoxaparin Injectable   30 milliGRAM(s) SubCutaneous (05-28-23 @ 05:55)    fluticasone propionate 50 MICROgram(s)/spray Nasal Spray   1 Spray(s) Both Nostrils (05-28-23 @ 06:03)   1 [place](s) Both Nostrils (05-27-23 @ 17:45)    insulin glargine Injectable (LANTUS)   4 Unit(s) SubCutaneous (05-27-23 @ 23:15)    insulin lispro (ADMELOG) corrective regimen sliding scale   3 Unit(s) SubCutaneous (05-27-23 @ 23:15)   2 Unit(s) SubCutaneous (05-27-23 @ 17:45)   1 Unit(s) SubCutaneous (05-27-23 @ 12:44)   1 Unit(s) SubCutaneous (05-27-23 @ 08:44)    insulin lispro Injectable (ADMELOG)   2 Unit(s) SubCutaneous (05-27-23 @ 17:46)   2 Unit(s) SubCutaneous (05-27-23 @ 12:44)   2 Unit(s) SubCutaneous (05-27-23 @ 08:45)    ketotifen 0.025% Ophthalmic Solution   1 Drop(s) Both EYES (05-28-23 @ 06:04)   1 Drop(s) Both EYES (05-27-23 @ 17:44)    metoprolol succinate ER   50 milliGRAM(s) Oral (05-28-23 @ 05:55)    mirtazapine   7.5 milliGRAM(s) Oral (05-27-23 @ 21:53)    nystatin    Suspension   958257 Unit(s) Oral (05-28-23 @ 05:54)   [id number] Unit(s) Oral (05-27-23 @ 23:18)   [id number] Unit(s) Oral (05-27-23 @ 17:44)   [id number] Unit(s) Oral (05-27-23 @ 12:44)    pantoprazole    Tablet   40 milliGRAM(s) Oral (05-28-23 @ 05:54)    piperacillin/tazobactam IVPB..   25 mL/Hr IV Intermittent (05-28-23 @ 05:53)   25 mL/Hr IV Intermittent (05-27-23 @ 21:54)   25 mL/Hr IV Intermittent (05-27-23 @ 12:44)    polyethylene glycol 3350   17 Gram(s) Oral (05-27-23 @ 12:43)    senna   2 Tablet(s) Oral (05-27-23 @ 21:53)    simethicone   80 milliGRAM(s) Chew (05-27-23 @ 12:44)    sodium chloride   1 Gram(s) Oral (05-28-23 @ 05:55)   1 Gram(s) Oral (05-27-23 @ 21:53)   1 Gram(s) Oral (05-27-23 @ 12:56)    sodium chloride 3%  Inhalation   4 milliLiter(s) Inhalation (05-27-23 @ 22:17)   4 milliLiter(s) Inhalation (05-27-23 @ 08:32)    tamsulosin   0.4 milliGRAM(s) Oral (05-27-23 @ 21:53)    MEDICATIONS  (STANDING):  acetylcysteine 10%  Inhalation 4 milliLiter(s) Inhalation daily  albuterol/ipratropium for Nebulization 3 milliLiter(s) Nebulizer every 6 hours  amLODIPine   Tablet 5 milliGRAM(s) Oral daily  budesonide 160 MICROgram(s)/formoterol 4.5 MICROgram(s) Inhaler 2 Puff(s) Inhalation two times a day  dextrose 5%. 1000 milliLiter(s) (100 mL/Hr) IV Continuous <Continuous>  dextrose 5%. 1000 milliLiter(s) (50 mL/Hr) IV Continuous <Continuous>  dextrose 50% Injectable 12.5 Gram(s) IV Push once  dextrose 50% Injectable 25 Gram(s) IV Push once  dextrose 50% Injectable 25 Gram(s) IV Push once  enoxaparin Injectable 30 milliGRAM(s) SubCutaneous every 24 hours  fluticasone propionate 50 MICROgram(s)/spray Nasal Spray 1 Spray(s) Both Nostrils two times a day  glucagon  Injectable 1 milliGRAM(s) IntraMuscular once  insulin glargine Injectable (LANTUS) 4 Unit(s) SubCutaneous at bedtime  insulin lispro (ADMELOG) corrective regimen sliding scale   SubCutaneous Before meals and at bedtime  insulin lispro Injectable (ADMELOG) 2 Unit(s) SubCutaneous three times a day before meals  ketotifen 0.025% Ophthalmic Solution 1 Drop(s) Both EYES two times a day  metoprolol succinate ER 50 milliGRAM(s) Oral daily  mirtazapine 7.5 milliGRAM(s) Oral at bedtime  nystatin    Suspension [id number] Unit(s) Oral four times a day  pantoprazole    Tablet 40 milliGRAM(s) Oral before breakfast  piperacillin/tazobactam IVPB.. 3.375 Gram(s) IV Intermittent every 8 hours  polyethylene glycol 3350 17 Gram(s) Oral daily  senna 2 Tablet(s) Oral at bedtime  simethicone 80 milliGRAM(s) Chew daily  sodium chloride 1 Gram(s) Oral three times a day  sodium chloride 3%  Inhalation 4 milliLiter(s) Inhalation every 12 hours  tamsulosin 0.4 milliGRAM(s) Oral at bedtime    MEDICATIONS  (PRN):  acetaminophen     Tablet .. 650 milliGRAM(s) Oral every 6 hours PRN Mild Pain (1 - 3)  bisacodyl 5 milliGRAM(s) Oral every 12 hours PRN Constipation  dextrose Oral Gel 15 Gram(s) Oral once PRN Blood Glucose LESS THAN 70 milliGRAM(s)/deciliter  sodium chloride 0.65% Nasal 1 Spray(s) Both Nostrils three times a day PRN Nasal Congestion    CAPILLARY BLOOD GLUCOSE  POCT Blood Glucose.: 276 mg/dL (27 May 2023 22:36)  POCT Blood Glucose.: 201 mg/dL (27 May 2023 17:08)  POCT Blood Glucose.: 174 mg/dL (27 May 2023 12:10)  POCT Blood Glucose.: 182 mg/dL (27 May 2023 08:32)    I&O's Summary    27 May 2023 07:01  -  28 May 2023 07:00  --------------------------------------------------------  IN: 0 mL / OUT: 300 mL / NET: -300 mL    PHYSICAL EXAM:  Vital Signs Last 24 Hrs  T(C): 36.3 (28 May 2023 05:47), Max: 36.6 (27 May 2023 13:03)  T(F): 97.3 (28 May 2023 05:47), Max: 97.9 (27 May 2023 13:03)  HR: 70 (28 May 2023 05:47) (68 - 89)  BP: 154/73 (28 May 2023 05:47) (130/63 - 154/73)  BP(mean): --  RR: 17 (28 May 2023 05:47) (17 - 18)  SpO2: 97% (28 May 2023 05:47) (93% - 99%)    Parameters below as of 28 May 2023 05:47  Patient On (Oxygen Delivery Method): nasal cannula    GENERAL: No acute distress, well-developed  HEAD:  Atraumatic, Normocephalic  EYES: EOMI, PERRLA, conjunctiva and sclera clear  NECK: Supple, no lymphadenopathy, no JVD  CHEST/LUNG: CTAB; No wheezes, rales, or rhonchi  HEART: Regular rate and rhythm; No murmurs, rubs, or gallops  ABDOMEN: Soft, non-tender, non-distended; normal bowel sounds, no organomegaly  EXTREMITIES:  2+ peripheral pulses b/l, No clubbing, cyanosis, or edema  NEUROLOGY: A&O x 3, no focal deficits  SKIN: No rashes or lesions    LABS:                        10.6   10.22 )-----------( 165      ( 28 May 2023 06:30 )             35.5     05-28    139  |  97<L>  |  19  ----------------------------<  179<H>  4.2   |  34<H>  |  0.40<L>    Ca    9.5      28 May 2023 06:30  Phos  3.4     05-28  Mg     2.30     05-28   SUBJECTIVE / OVERNIGHT EVENS: NAEON. This am pt sitting up comfortably in bed, NAD. Denies HA. cp, SOB. Notes she has gas but no abd pain, n/v.     CAPILLARY BLOOD GLUCOSE  POCT Blood Glucose.: 276 mg/dL (27 May 2023 22:36)  POCT Blood Glucose.: 201 mg/dL (27 May 2023 17:08)  POCT Blood Glucose.: 174 mg/dL (27 May 2023 12:10)  POCT Blood Glucose.: 182 mg/dL (27 May 2023 08:32)    acetylcysteine 10%  Inhalation   4 milliLiter(s) Inhalation (05-27-23 @ 08:31)    albuterol/ipratropium for Nebulization   3 milliLiter(s) Nebulizer (05-28-23 @ 04:00)   3 milliLiter(s) Nebulizer (05-27-23 @ 22:17)   3 milliLiter(s) Nebulizer (05-27-23 @ 15:36)   3 milliLiter(s) Nebulizer (05-27-23 @ 08:32)    amLODIPine   Tablet   5 milliGRAM(s) Oral (05-28-23 @ 05:54)    budesonide 160 MICROgram(s)/formoterol 4.5 MICROgram(s) Inhaler   2 Puff(s) Inhalation (05-27-23 @ 21:57)   2 Puff(s) Inhalation (05-27-23 @ 08:45)    enoxaparin Injectable   30 milliGRAM(s) SubCutaneous (05-28-23 @ 05:55)    fluticasone propionate 50 MICROgram(s)/spray Nasal Spray   1 Spray(s) Both Nostrils (05-28-23 @ 06:03)   1 [place](s) Both Nostrils (05-27-23 @ 17:45)    insulin glargine Injectable (LANTUS)   4 Unit(s) SubCutaneous (05-27-23 @ 23:15)    insulin lispro (ADMELOG) corrective regimen sliding scale   3 Unit(s) SubCutaneous (05-27-23 @ 23:15)   2 Unit(s) SubCutaneous (05-27-23 @ 17:45)   1 Unit(s) SubCutaneous (05-27-23 @ 12:44)   1 Unit(s) SubCutaneous (05-27-23 @ 08:44)    insulin lispro Injectable (ADMELOG)   2 Unit(s) SubCutaneous (05-27-23 @ 17:46)   2 Unit(s) SubCutaneous (05-27-23 @ 12:44)   2 Unit(s) SubCutaneous (05-27-23 @ 08:45)    ketotifen 0.025% Ophthalmic Solution   1 Drop(s) Both EYES (05-28-23 @ 06:04)   1 Drop(s) Both EYES (05-27-23 @ 17:44)    metoprolol succinate ER   50 milliGRAM(s) Oral (05-28-23 @ 05:55)    mirtazapine   7.5 milliGRAM(s) Oral (05-27-23 @ 21:53)    nystatin    Suspension   [id number] Unit(s) Oral (05-28-23 @ 05:54)   [id number] Unit(s) Oral (05-27-23 @ 23:18)   [id number] Unit(s) Oral (05-27-23 @ 17:44)   [id number] Unit(s) Oral (05-27-23 @ 12:44)    pantoprazole    Tablet   40 milliGRAM(s) Oral (05-28-23 @ 05:54)    piperacillin/tazobactam IVPB..   25 mL/Hr IV Intermittent (05-28-23 @ 05:53)   25 mL/Hr IV Intermittent (05-27-23 @ 21:54)   25 mL/Hr IV Intermittent (05-27-23 @ 12:44)    polyethylene glycol 3350   17 Gram(s) Oral (05-27-23 @ 12:43)    senna   2 Tablet(s) Oral (05-27-23 @ 21:53)    simethicone   80 milliGRAM(s) Chew (05-27-23 @ 12:44)    sodium chloride   1 Gram(s) Oral (05-28-23 @ 05:55)   1 Gram(s) Oral (05-27-23 @ 21:53)   1 Gram(s) Oral (05-27-23 @ 12:56)    sodium chloride 3%  Inhalation   4 milliLiter(s) Inhalation (05-27-23 @ 22:17)   4 milliLiter(s) Inhalation (05-27-23 @ 08:32)    tamsulosin   0.4 milliGRAM(s) Oral (05-27-23 @ 21:53)    MEDICATIONS  (STANDING):  acetylcysteine 10%  Inhalation 4 milliLiter(s) Inhalation daily  albuterol/ipratropium for Nebulization 3 milliLiter(s) Nebulizer every 6 hours  amLODIPine   Tablet 5 milliGRAM(s) Oral daily  budesonide 160 MICROgram(s)/formoterol 4.5 MICROgram(s) Inhaler 2 Puff(s) Inhalation two times a day  dextrose 5%. 1000 milliLiter(s) (100 mL/Hr) IV Continuous <Continuous>  dextrose 5%. 1000 milliLiter(s) (50 mL/Hr) IV Continuous <Continuous>  dextrose 50% Injectable 12.5 Gram(s) IV Push once  dextrose 50% Injectable 25 Gram(s) IV Push once  dextrose 50% Injectable 25 Gram(s) IV Push once  enoxaparin Injectable 30 milliGRAM(s) SubCutaneous every 24 hours  fluticasone propionate 50 MICROgram(s)/spray Nasal Spray 1 Spray(s) Both Nostrils two times a day  glucagon  Injectable 1 milliGRAM(s) IntraMuscular once  insulin glargine Injectable (LANTUS) 4 Unit(s) SubCutaneous at bedtime  insulin lispro (ADMELOG) corrective regimen sliding scale   SubCutaneous Before meals and at bedtime  insulin lispro Injectable (ADMELOG) 2 Unit(s) SubCutaneous three times a day before meals  ketotifen 0.025% Ophthalmic Solution 1 Drop(s) Both EYES two times a day  metoprolol succinate ER 50 milliGRAM(s) Oral daily  mirtazapine 7.5 milliGRAM(s) Oral at bedtime  nystatin    Suspension [id number] Unit(s) Oral four times a day  pantoprazole    Tablet 40 milliGRAM(s) Oral before breakfast  piperacillin/tazobactam IVPB.. 3.375 Gram(s) IV Intermittent every 8 hours  polyethylene glycol 3350 17 Gram(s) Oral daily  senna 2 Tablet(s) Oral at bedtime  simethicone 80 milliGRAM(s) Chew daily  sodium chloride 1 Gram(s) Oral three times a day  sodium chloride 3%  Inhalation 4 milliLiter(s) Inhalation every 12 hours  tamsulosin 0.4 milliGRAM(s) Oral at bedtime    MEDICATIONS  (PRN):  acetaminophen     Tablet .. 650 milliGRAM(s) Oral every 6 hours PRN Mild Pain (1 - 3)  bisacodyl 5 milliGRAM(s) Oral every 12 hours PRN Constipation  dextrose Oral Gel 15 Gram(s) Oral once PRN Blood Glucose LESS THAN 70 milliGRAM(s)/deciliter  sodium chloride 0.65% Nasal 1 Spray(s) Both Nostrils three times a day PRN Nasal Congestion    CAPILLARY BLOOD GLUCOSE  POCT Blood Glucose.: 276 mg/dL (27 May 2023 22:36)  POCT Blood Glucose.: 201 mg/dL (27 May 2023 17:08)  POCT Blood Glucose.: 174 mg/dL (27 May 2023 12:10)  POCT Blood Glucose.: 182 mg/dL (27 May 2023 08:32)    I&O's Summary    27 May 2023 07:01  -  28 May 2023 07:00  --------------------------------------------------------  IN: 0 mL / OUT: 300 mL / NET: -300 mL    PHYSICAL EXAM:  Vital Signs Last 24 Hrs  T(C): 36.3 (28 May 2023 05:47), Max: 36.6 (27 May 2023 13:03)  T(F): 97.3 (28 May 2023 05:47), Max: 97.9 (27 May 2023 13:03)  HR: 70 (28 May 2023 05:47) (68 - 89)  BP: 154/73 (28 May 2023 05:47) (130/63 - 154/73)  BP(mean): --  RR: 17 (28 May 2023 05:47) (17 - 18)  SpO2: 97% (28 May 2023 05:47) (93% - 99%)    Parameters below as of 28 May 2023 05:47  Patient On (Oxygen Delivery Method): nasal cannula    GENERAL: No acute distress, well-developed  HEAD:  Atraumatic, Normocephalic  EYES: EOMI  NECK: Supple  CHEST/LUNG: CTAB; No wheezes, rales, or rhonchi  HEART: Regular rate and rhythm; No murmurs, rubs, or gallops  ABDOMEN: Soft, non-tender, non-distended; normal bowel sounds, no organomegaly  EXTREMITIES:  2+ peripheral pulses b/l, No clubbing, cyanosis, or edema  NEUROLOGY: A&O x 3, no focal deficits  SKIN: No rashes or lesions    LABS:                        10.6   10.22 )-----------( 165      ( 28 May 2023 06:30 )             35.5     05-28    139  |  97<L>  |  19  ----------------------------<  179<H>  4.2   |  34<H>  |  0.40<L>    Ca    9.5      28 May 2023 06:30  Phos  3.4     05-28  Mg     2.30     05-28

## 2023-05-28 NOTE — PROGRESS NOTE ADULT - ATTENDING SUPERVISION STATEMENT
Fellow
Resident
Fellow
Resident

## 2023-05-29 VITALS — OXYGEN SATURATION: 97 %

## 2023-05-29 RX ORDER — AMLODIPINE BESYLATE 2.5 MG/1
1 TABLET ORAL
Qty: 0 | Refills: 2 | DISCHARGE

## 2023-05-29 RX ORDER — AMLODIPINE BESYLATE 2.5 MG/1
1 TABLET ORAL
Qty: 30 | Refills: 0
Start: 2023-05-29

## 2023-05-29 RX ORDER — IPRATROPIUM/ALBUTEROL SULFATE 18-103MCG
3 AEROSOL WITH ADAPTER (GRAM) INHALATION
Qty: 99 | Refills: 0
Start: 2023-05-29

## 2023-05-29 RX ORDER — SODIUM CHLORIDE 9 MG/ML
1 INJECTION INTRAMUSCULAR; INTRAVENOUS; SUBCUTANEOUS
Qty: 90 | Refills: 0
Start: 2023-05-29

## 2023-05-29 RX ORDER — FLUTICASONE PROPIONATE 50 MCG
1 SPRAY, SUSPENSION NASAL
Qty: 1 | Refills: 0
Start: 2023-05-29

## 2023-05-29 RX ORDER — BUDESONIDE AND FORMOTEROL FUMARATE DIHYDRATE 160; 4.5 UG/1; UG/1
2 AEROSOL RESPIRATORY (INHALATION)
Qty: 1 | Refills: 0
Start: 2023-05-29 | End: 2023-06-27

## 2023-05-29 RX ORDER — SODIUM CHLORIDE 9 MG/ML
1 INJECTION INTRAMUSCULAR; INTRAVENOUS; SUBCUTANEOUS
Refills: 0 | DISCHARGE

## 2023-05-29 RX ORDER — ALBUTEROL 90 UG/1
1 AEROSOL, METERED ORAL
Qty: 1 | Refills: 0
Start: 2023-05-29

## 2023-05-29 RX ADMIN — PANTOPRAZOLE SODIUM 40 MILLIGRAM(S): 20 TABLET, DELAYED RELEASE ORAL at 06:22

## 2023-05-29 RX ADMIN — SIMETHICONE 80 MILLIGRAM(S): 80 TABLET, CHEWABLE ORAL at 12:38

## 2023-05-29 RX ADMIN — Medication 1: at 08:48

## 2023-05-29 RX ADMIN — Medication 3 MILLILITER(S): at 07:58

## 2023-05-29 RX ADMIN — SODIUM CHLORIDE 1 GRAM(S): 9 INJECTION INTRAMUSCULAR; INTRAVENOUS; SUBCUTANEOUS at 12:37

## 2023-05-29 RX ADMIN — SODIUM CHLORIDE 4 MILLILITER(S): 9 INJECTION INTRAMUSCULAR; INTRAVENOUS; SUBCUTANEOUS at 07:58

## 2023-05-29 RX ADMIN — Medication 50 MILLIGRAM(S): at 06:24

## 2023-05-29 RX ADMIN — Medication 1: at 12:38

## 2023-05-29 RX ADMIN — AMLODIPINE BESYLATE 5 MILLIGRAM(S): 2.5 TABLET ORAL at 06:24

## 2023-05-29 RX ADMIN — Medication 500000 UNIT(S): at 06:24

## 2023-05-29 RX ADMIN — SODIUM CHLORIDE 1 GRAM(S): 9 INJECTION INTRAMUSCULAR; INTRAVENOUS; SUBCUTANEOUS at 06:23

## 2023-05-29 RX ADMIN — Medication 500000 UNIT(S): at 12:38

## 2023-05-29 RX ADMIN — KETOTIFEN FUMARATE 1 DROP(S): 0.34 SOLUTION OPHTHALMIC at 06:23

## 2023-05-29 RX ADMIN — Medication 1 SPRAY(S): at 06:23

## 2023-05-29 RX ADMIN — Medication 3 MILLILITER(S): at 14:57

## 2023-05-29 RX ADMIN — ENOXAPARIN SODIUM 30 MILLIGRAM(S): 100 INJECTION SUBCUTANEOUS at 06:24

## 2023-05-29 RX ADMIN — Medication 3 UNIT(S): at 12:38

## 2023-05-29 RX ADMIN — Medication 3 MILLILITER(S): at 04:22

## 2023-05-29 RX ADMIN — Medication 3 UNIT(S): at 08:49

## 2023-05-29 RX ADMIN — Medication 4 MILLILITER(S): at 08:03

## 2023-05-29 RX ADMIN — BUDESONIDE AND FORMOTEROL FUMARATE DIHYDRATE 2 PUFF(S): 160; 4.5 AEROSOL RESPIRATORY (INHALATION) at 08:51

## 2023-05-29 NOTE — PROGRESS NOTE ADULT - PROBLEM SELECTOR PROBLEM 1
Acute hypercapnic respiratory failure
Chronic obstructive pulmonary disease (COPD)
Acute hypercapnic respiratory failure

## 2023-05-29 NOTE — PROGRESS NOTE ADULT - PROBLEM SELECTOR PROBLEM 3
Constipation
Hyponatremia
Debility
Hyponatremia

## 2023-05-29 NOTE — PROGRESS NOTE ADULT - PROVIDER SPECIALTY LIST ADULT
Infectious Disease
Internal Medicine
Pulmonology
Pulmonology
Infectious Disease
Pulmonology
Internal Medicine
Pulmonology
Palliative Care
Internal Medicine

## 2023-05-29 NOTE — PROGRESS NOTE ADULT - SUBJECTIVE AND OBJECTIVE BOX
Snow Blood, PGY1    GRACIE BHAKTA  68y  Female    Complaints:  Subjective:     No acute o/n events. Pt denies subj fevers/chills, HA, dizziness, chest pain, palpitations, n/v, abd pain, dysuria, diarrhea      FAMILY HISTORY:  Family history of diabetes mellitus  father    Family history of allergic rhinitis (Child, Child)  son, daughter    Family history of bronchitis  mother, father      68yVital Signs Last 24 Hrs  T(C): 37 (29 May 2023 06:08), Max: 37.2 (28 May 2023 23:03)  T(F): 98.6 (29 May 2023 06:08), Max: 98.9 (28 May 2023 23:03)  HR: 73 (29 May 2023 06:08) (60 - 85)  BP: 142/66 (29 May 2023 06:08) (133/65 - 142/66)  BP(mean): --  RR: 17 (29 May 2023 06:08) (17 - 18)  SpO2: 95% (29 May 2023 06:08) (92% - 100%)    Parameters below as of 29 May 2023 06:08  Patient On (Oxygen Delivery Method): nasal cannula  O2 Flow (L/min): 3        PHYSICAL EXAM:  GENERAL: NAD, well-groomed, well-developed  HEAD:  Atraumatic, Normocephalic  EYES: EOMI, PERRLA, conjunctiva and sclera clear  ENMT: No tonsillar erythema, exudates, or enlargement; Moist mucous membranes, Good dentition, No lesions  NECK: Supple, No JVD, Normal thyroid  NERVOUS SYSTEM:  Alert & Oriented X3, Good concentration; Motor Strength 5/5 B/L upper and lower extremities; DTRs 2+ intact and symmetric  CHEST/LUNG: Clear to percussion bilaterally; No rales, rhonchi, wheezing, or rubs  HEART: Regular rate and rhythm; No murmurs, rubs, or gallops  ABDOMEN: Soft, Nontender, Nondistended; Bowel sounds present  EXTREMITIES:  2+ Peripheral Pulses, No clubbing, cyanosis, or edema  LYMPH: No lymphadenopathy noted  SKIN: No rashes or lesions      Consultant(s) Notes Reviewed:  [x ] YES  [ ] NO  Care Discussed with Consultants/Other Providers [ x] YES  [ ] NO    LABS:                Female  RADIOLOGY & ADDITIONAL TESTS:    Imaging Personally Reviewed:  [ ] YES  [ ] NO    MedsMEDICATIONS  (STANDING):  acetylcysteine 10%  Inhalation 4 milliLiter(s) Inhalation daily  albuterol/ipratropium for Nebulization 3 milliLiter(s) Nebulizer every 6 hours  amLODIPine   Tablet 5 milliGRAM(s) Oral daily  budesonide 160 MICROgram(s)/formoterol 4.5 MICROgram(s) Inhaler 2 Puff(s) Inhalation two times a day  dextrose 5%. 1000 milliLiter(s) (100 mL/Hr) IV Continuous <Continuous>  dextrose 5%. 1000 milliLiter(s) (50 mL/Hr) IV Continuous <Continuous>  dextrose 50% Injectable 25 Gram(s) IV Push once  dextrose 50% Injectable 12.5 Gram(s) IV Push once  dextrose 50% Injectable 25 Gram(s) IV Push once  enoxaparin Injectable 30 milliGRAM(s) SubCutaneous every 24 hours  fluticasone propionate 50 MICROgram(s)/spray Nasal Spray 1 Spray(s) Both Nostrils two times a day  glucagon  Injectable 1 milliGRAM(s) IntraMuscular once  insulin glargine Injectable (LANTUS) 6 Unit(s) SubCutaneous at bedtime  insulin lispro (ADMELOG) corrective regimen sliding scale   SubCutaneous Before meals and at bedtime  insulin lispro Injectable (ADMELOG) 3 Unit(s) SubCutaneous three times a day before meals  ketotifen 0.025% Ophthalmic Solution 1 Drop(s) Both EYES two times a day  metoprolol succinate ER 50 milliGRAM(s) Oral daily  mirtazapine 7.5 milliGRAM(s) Oral at bedtime  nystatin    Suspension 145578 Unit(s) Oral four times a day  pantoprazole    Tablet 40 milliGRAM(s) Oral before breakfast  polyethylene glycol 3350 17 Gram(s) Oral daily  senna 2 Tablet(s) Oral at bedtime  simethicone 80 milliGRAM(s) Chew daily  sodium chloride 1 Gram(s) Oral three times a day  sodium chloride 3%  Inhalation 4 milliLiter(s) Inhalation every 12 hours  tamsulosin 0.4 milliGRAM(s) Oral at bedtime    MEDICATIONS  (PRN):  acetaminophen     Tablet .. 650 milliGRAM(s) Oral every 6 hours PRN Mild Pain (1 - 3)  bisacodyl 5 milliGRAM(s) Oral every 12 hours PRN Constipation  dextrose Oral Gel 15 Gram(s) Oral once PRN Blood Glucose LESS THAN 70 milliGRAM(s)/deciliter  sodium chloride 0.65% Nasal 1 Spray(s) Both Nostrils three times a day PRN Nasal Congestion      HEALTH ISSUES - PROBLEM Dx:  Acute hypercapnic respiratory failure    Sepsis with acute hypercapnic respiratory failure and septic shock, due to unspecified organism    Metabolic encephalopathy    HTN (hypertension), benign    DM2 (diabetes mellitus, type 2)    Constipation    Hyperkalemia    Need for prophylactic measure    Functional quadriplegia    Advanced care planning/counseling discussion    Encounter for palliative care    Debility    Hyponatremia                   Snow Blood, PGY1    GRACIE BHAKTA  68y  Female    Complaints:  Subjective:     No acute o/n events. Reports baseline difficulty breathing not worsened over last week. Pt denies subj fevers/chills, HA, dizziness, chest pain, palpitations, n/v, abd pain, dysuria, diarrhea      FAMILY HISTORY:  Family history of diabetes mellitus  father    Family history of allergic rhinitis (Child, Child)  son, daughter    Family history of bronchitis  mother, father      68yVital Signs Last 24 Hrs  T(C): 37 (29 May 2023 06:08), Max: 37.2 (28 May 2023 23:03)  T(F): 98.6 (29 May 2023 06:08), Max: 98.9 (28 May 2023 23:03)  HR: 73 (29 May 2023 06:08) (60 - 85)  BP: 142/66 (29 May 2023 06:08) (133/65 - 142/66)  BP(mean): --  RR: 17 (29 May 2023 06:08) (17 - 18)  SpO2: 95% (29 May 2023 06:08) (92% - 100%)    Parameters below as of 29 May 2023 06:08  Patient On (Oxygen Delivery Method): nasal cannula  O2 Flow (L/min): 3        PHYSICAL EXAM:  GENERAL: Increased work of breathing. Cachectic appearance.   HEAD:  Atraumatic, Normocephalic  NECK: Supple, No JVD,  NERVOUS SYSTEM:  Alert & Oriented X3, Pt follows basic commands, Moving all extremities  CHEST/LUNG: +wheezing/rhonchi   HEART: Regular rate and rhythm; No murmurs  ABDOMEN: Soft, Nontender, Nondistended; Bowel sounds present  EXTREMITIES:  2+ Peripheral Pulses, No clubbing, cyanosis, or edema  LYMPH: No lymphadenopathy noted  SKIN: No rashes or lesionslesions      Consultant(s) Notes Reviewed:  [x ] YES  [ ] NO  Care Discussed with Consultants/Other Providers [ x] YES  [ ] NO    LABS:                          10.6   10.22 )-----------( 165      ( 28 May 2023 06:30 )             35.5       05-28    139  |  97<L>  |  19  ----------------------------<  179<H>  4.2   |  34<H>  |  0.40<L>    Ca    9.5      28 May 2023 06:30  Phos  3.4     05-28  Mg     2.30     05-28              CAPILLARY BLOOD GLUCOSE      POCT Blood Glucose.: 161 mg/dL (29 May 2023 08:34)        Female  RADIOLOGY & ADDITIONAL TESTS:    Imaging Personally Reviewed:  [ ] YES  [ ] NO    MedsMEDICATIONS  (STANDING):  acetylcysteine 10%  Inhalation 4 milliLiter(s) Inhalation daily  albuterol/ipratropium for Nebulization 3 milliLiter(s) Nebulizer every 6 hours  amLODIPine   Tablet 5 milliGRAM(s) Oral daily  budesonide 160 MICROgram(s)/formoterol 4.5 MICROgram(s) Inhaler 2 Puff(s) Inhalation two times a day  dextrose 5%. 1000 milliLiter(s) (100 mL/Hr) IV Continuous <Continuous>  dextrose 5%. 1000 milliLiter(s) (50 mL/Hr) IV Continuous <Continuous>  dextrose 50% Injectable 25 Gram(s) IV Push once  dextrose 50% Injectable 12.5 Gram(s) IV Push once  dextrose 50% Injectable 25 Gram(s) IV Push once  enoxaparin Injectable 30 milliGRAM(s) SubCutaneous every 24 hours  fluticasone propionate 50 MICROgram(s)/spray Nasal Spray 1 Spray(s) Both Nostrils two times a day  glucagon  Injectable 1 milliGRAM(s) IntraMuscular once  insulin glargine Injectable (LANTUS) 6 Unit(s) SubCutaneous at bedtime  insulin lispro (ADMELOG) corrective regimen sliding scale   SubCutaneous Before meals and at bedtime  insulin lispro Injectable (ADMELOG) 3 Unit(s) SubCutaneous three times a day before meals  ketotifen 0.025% Ophthalmic Solution 1 Drop(s) Both EYES two times a day  metoprolol succinate ER 50 milliGRAM(s) Oral daily  mirtazapine 7.5 milliGRAM(s) Oral at bedtime  nystatin    Suspension [id number] Unit(s) Oral four times a day  pantoprazole    Tablet 40 milliGRAM(s) Oral before breakfast  polyethylene glycol 3350 17 Gram(s) Oral daily  senna 2 Tablet(s) Oral at bedtime  simethicone 80 milliGRAM(s) Chew daily  sodium chloride 1 Gram(s) Oral three times a day  sodium chloride 3%  Inhalation 4 milliLiter(s) Inhalation every 12 hours  tamsulosin 0.4 milliGRAM(s) Oral at bedtime    MEDICATIONS  (PRN):  acetaminophen     Tablet .. 650 milliGRAM(s) Oral every 6 hours PRN Mild Pain (1 - 3)  bisacodyl 5 milliGRAM(s) Oral every 12 hours PRN Constipation  dextrose Oral Gel 15 Gram(s) Oral once PRN Blood Glucose LESS THAN 70 milliGRAM(s)/deciliter  sodium chloride 0.65% Nasal 1 Spray(s) Both Nostrils three times a day PRN Nasal Congestion      HEALTH ISSUES - PROBLEM Dx:  Acute hypercapnic respiratory failure    Sepsis with acute hypercapnic respiratory failure and septic shock, due to unspecified organism    Metabolic encephalopathy    HTN (hypertension), benign    DM2 (diabetes mellitus, type 2)    Constipation    Hyperkalemia    Need for prophylactic measure    Functional quadriplegia    Advanced care planning/counseling discussion    Encounter for palliative care    Debility    Hyponatremia

## 2023-05-29 NOTE — PROGRESS NOTE ADULT - REASON FOR ADMISSION
Acute Hypercapneic Hypoxemic Respiratory failure
Acute Hypercapnic Hypoxemic Respiratory failure
Acute Hypercapneic Hypoxemic Respiratory failure
Acute Hypercapneic Hypoxemic Respiratory failure
Acute Hypercapnic Hypoxemic Respiratory failure
Acute Hypercapneic Hypoxemic Respiratory failure
Acute Hypercapnic Hypoxemic Respiratory failure
Acute Hypercapneic Hypoxemic Respiratory failure

## 2023-05-29 NOTE — PROGRESS NOTE ADULT - NUTRITIONAL ASSESSMENT
This patient has been assessed with a concern for Malnutrition and has been determined to have a diagnosis/diagnoses of Severe protein-calorie malnutrition and Underweight (BMI < 19).    This patient is being managed with:   Diet Consistent Carbohydrate/No Snacks-  DASH/TLC {Sodium & Cholesterol Restricted} (DASH)  1000mL Fluid Restriction ([id number])  No Beef  No Pork  Entered: May 20 2023 10:12AM  
This patient has been assessed with a concern for Malnutrition and has been determined to have a diagnosis/diagnoses of Severe protein-calorie malnutrition and Underweight (BMI < 19).    This patient is being managed with:   Diet Consistent Carbohydrate/No Snacks-  DASH/TLC {Sodium & Cholesterol Restricted} (DASH)  1000mL Fluid Restriction ([id number])  No Beef  No Pork  Entered: May 20 2023 10:12AM  
This patient has been assessed with a concern for Malnutrition and has been determined to have a diagnosis/diagnoses of Severe protein-calorie malnutrition and Underweight (BMI < 19).    This patient is being managed with:   Diet Soft and Bite Sized-  Consistent Carbohydrate {No Snacks} (CSTCHO)  DASH/TLC {Sodium & Cholesterol Restricted} (DASH)  Supplement Feeding Modality:  Oral  Ensure Max Cans or Servings Per Day:  1       Frequency:  Two Times a day  Entered: Apr 10 2023  5:08PM  
This patient has been assessed with a concern for Malnutrition and has been determined to have a diagnosis/diagnoses of Severe protein-calorie malnutrition and Underweight (BMI < 19).    This patient is being managed with:   Diet Consistent Carbohydrate/No Snacks-  DASH/TLC {Sodium & Cholesterol Restricted} (DASH)  1000mL Fluid Restriction ([id number])  No Beef  No Pork  Entered: May 20 2023 10:12AM  
This patient has been assessed with a concern for Malnutrition and has been determined to have a diagnosis/diagnoses of Severe protein-calorie malnutrition and Underweight (BMI < 19).    This patient is being managed with:   Diet Consistent Carbohydrate/No Snacks-  DASH/TLC {Sodium & Cholesterol Restricted} (DASH)  1000mL Fluid Restriction ([id number])  No Beef  No Pork  Entered: May 20 2023 10:12AM  
This patient has been assessed with a concern for Malnutrition and has been determined to have a diagnosis/diagnoses of Severe protein-calorie malnutrition and Underweight (BMI < 19).    This patient is being managed with:   Diet Consistent Carbohydrate/No Snacks-  DASH/TLC {Sodium & Cholesterol Restricted} (DASH)  1000mL Fluid Restriction ([id number])  No Beef  No Pork  Entered: May 20 2023 10:12AM  
This patient has been assessed with a concern for Malnutrition and has been determined to have a diagnosis/diagnoses of Severe protein-calorie malnutrition and Underweight (BMI < 19).    This patient is being managed with:   Diet Consistent Carbohydrate/No Snacks-  DASH/TLC {Sodium & Cholesterol Restricted} (DASH)  1000mL Fluid Restriction ([id number])  No Beef  No Pork  Entered: May 20 2023 10:12AM  
This patient has been assessed with a concern for Malnutrition and has been determined to have a diagnosis/diagnoses of Severe protein-calorie malnutrition and Underweight (BMI < 19).    This patient is being managed with:   Diet Consistent Carbohydrate/No Snacks-  DASH/TLC {Sodium & Cholesterol Restricted} (DASH)  1000mL Fluid Restriction ([id number])  No Beef  No Pork  Entered: May 20 2023 10:12AM  
This patient has been assessed with a concern for Malnutrition and has been determined to have a diagnosis/diagnoses of Severe protein-calorie malnutrition and Underweight (BMI < 19).    This patient is being managed with:   Diet Consistent Carbohydrate/No Snacks-  DASH/TLC {Sodium & Cholesterol Restricted} (DASH)  1000mL Fluid Restriction ([id number])  No Beef  No Pork  Entered: May 20 2023 10:12AM  
This patient has been assessed with a concern for Malnutrition and has been determined to have a diagnosis/diagnoses of Severe protein-calorie malnutrition and Underweight (BMI < 19).    This patient is being managed with:   Diet Consistent Carbohydrate/No Snacks-  DASH/TLC {Sodium & Cholesterol Restricted} (DASH)  1000mL Fluid Restriction ([id number])  No Beef  No Pork  Entered: May 20 2023 10:12AM

## 2023-05-29 NOTE — PROGRESS NOTE ADULT - PROBLEM SELECTOR PROBLEM 2
Last O/V: 12/11/2018   Next O/V: 06/11/2019
Metabolic encephalopathy
Primary ciliary dyskinesia
Metabolic encephalopathy

## 2023-05-31 NOTE — PROGRESS NOTE ADULT - PROBLEM SELECTOR PROBLEM 6
Vaginal itching
Detail Level: Detailed
Anemia
Vaginal itching

## 2023-06-01 ENCOUNTER — APPOINTMENT (OUTPATIENT)
Dept: PULMONOLOGY | Facility: CLINIC | Age: 68
End: 2023-06-01
Payer: MEDICAID

## 2023-06-01 ENCOUNTER — LABORATORY RESULT (OUTPATIENT)
Age: 68
End: 2023-06-01

## 2023-06-01 DIAGNOSIS — J45.909 UNSPECIFIED ASTHMA, UNCOMPLICATED: ICD-10-CM

## 2023-06-01 DIAGNOSIS — R09.82 POSTNASAL DRIP: ICD-10-CM

## 2023-06-01 PROCEDURE — 99496 TRANSJ CARE MGMT HIGH F2F 7D: CPT | Mod: 95

## 2023-06-01 RX ORDER — SODIUM CHLORIDE FOR INHALATION 3 %
3 VIAL, NEBULIZER (ML) INHALATION
Qty: 240 | Refills: 2 | Status: ACTIVE | COMMUNITY
Start: 2020-10-08 | End: 1900-01-01

## 2023-06-01 RX ORDER — ALBUTEROL SULFATE 2.5 MG/3ML
(2.5 MG/3ML) SOLUTION RESPIRATORY (INHALATION)
Qty: 3 | Refills: 5 | Status: ACTIVE | COMMUNITY
Start: 2018-12-20 | End: 1900-01-01

## 2023-06-01 RX ORDER — ALBUTEROL SULFATE 90 UG/1
108 (90 BASE) AEROSOL, METERED RESPIRATORY (INHALATION)
Qty: 1 | Refills: 5 | Status: ACTIVE | OUTPATIENT
Start: 2019-03-21

## 2023-06-02 ENCOUNTER — NON-APPOINTMENT (OUTPATIENT)
Age: 68
End: 2023-06-02

## 2023-06-02 PROBLEM — R09.82 POST-NASAL DRIP: Status: ACTIVE | Noted: 2018-08-30

## 2023-06-02 PROBLEM — J45.909 ASTHMA: Status: ACTIVE | Noted: 2021-04-09

## 2023-06-02 NOTE — HISTORY OF PRESENT ILLNESS
[Home] : at home, [unfilled] , at the time of the visit. [Medical Office: ([facility])___] : at the medical office located in  [Verbal consent obtained from patient] : the patient, [unfilled] [Patient Contacted By: ____] : and contacted by [unfilled] [[facility]] : Post-hospitalization from [facility] [Respiratory failure with hypoxia and hypercapnia] : Respiratory failure with hypoxia and hypercapnia [Other: _____] : [unfilled] [Yes] : Yes [Admitted on: ___] : The patient was admitted on [unfilled] [Discharged on ___] : discharged on [unfilled] [FreeTextEntry2] : 68-year-old woman with a past medical history of primary ciliary dyskinesia complicated by bronchiectasis, hypertension, chronic hypoxic and hypercapnic respiratory failure on AVAPS presents after recent admission for bronchiectasis exacerbation.\par \par During her hospitalization she was treated for bronchiectasis exacerbation.  She completed a course of Zosyn while inpatient.  She also received a 5-day course of p.o. prednisone.  Upon discharge she remained dyspneic though mental status was improved compared to prior\par \par \par Patient was not using hypertonic saline.  She was using DuoNebs only every 12 hours.  They were using acetylcysteine every 6 hours.  Family states that her mental status is at baseline however she does continue to feel short of breath.  She has a chronic cough with sputum production.  They state when she can mobilize her sputum she feels better.\par \par AVAPS FIO2: 35%\par Expiratory pressure: 8\par Back up rate: 18\par Tidal volume: 450\par Maximum pressure: 30\par Minimum pressure: 20\par \par Medications\par acetylcysteine q6h \par duoneb q6h \par aerobika \par amlodipine \par azithromycin \par flonase\par

## 2023-06-02 NOTE — PHYSICAL EXAM
[de-identified] : awake and alert but chronically ill appearing [de-identified] : tachypnic with some accessory muscle use but no overt cough. Speaking full sentences, saturating 95% on 3L

## 2023-06-02 NOTE — ASSESSMENT
[FreeTextEntry1] : Recent admission for bronchiectasis exacerbation.  Her mental status is improved but she remains significantly dyspneic.  We had an extensive discussion regarding the importance of airway clearance regimen.  They were only using albuterol twice a day.  Advised:\par \par -DuoNeb every 6 hours standing\par -Acetylcysteine every 6 hours standing\par -Aerobika valve use after nebulizer treatment\par -Add hypertonic saline every 12 hours nebulized \par -Continue Flonase intranasally\par -They state AVAPS mask is new and ill fitting–pressure on her nasal bridge with leak towards the bottom.  Request for fitting by [facility] placed.\par -Patient has follow-up with Dr. Sin's next week.

## 2023-06-07 ENCOUNTER — APPOINTMENT (OUTPATIENT)
Dept: PULMONOLOGY | Facility: CLINIC | Age: 68
End: 2023-06-07
Payer: MEDICAID

## 2023-06-07 DIAGNOSIS — E87.1 HYPO-OSMOLALITY AND HYPONATREMIA: ICD-10-CM

## 2023-06-07 DIAGNOSIS — R93.2 ABNORMAL FINDINGS ON DIAGNOSTIC IMAGING OF LIVER AND BILIARY TRACT: ICD-10-CM

## 2023-06-07 PROCEDURE — 99215 OFFICE O/P EST HI 40 MIN: CPT | Mod: 95

## 2023-06-07 RX ORDER — PREDNISONE 10 MG/1
10 TABLET ORAL
Qty: 20 | Refills: 0 | Status: DISCONTINUED | COMMUNITY
Start: 2021-12-01 | End: 2023-06-07

## 2023-06-07 RX ORDER — AZITHROMYCIN 250 MG/1
250 TABLET, FILM COATED ORAL
Qty: 1 | Refills: 0 | Status: DISCONTINUED | COMMUNITY
Start: 2022-12-20 | End: 2023-06-07

## 2023-06-07 RX ORDER — PREDNISONE 10 MG/1
10 TABLET ORAL
Qty: 40 | Refills: 0 | Status: ACTIVE | COMMUNITY
Start: 2022-02-23 | End: 1900-01-01

## 2023-06-07 RX ORDER — LEVOFLOXACIN 500 MG/1
500 TABLET, FILM COATED ORAL DAILY
Qty: 7 | Refills: 0 | Status: DISCONTINUED | COMMUNITY
Start: 2021-03-29 | End: 2023-06-07

## 2023-06-07 RX ORDER — NORMAL SALT TABLETS 1 G/G
1 TABLET ORAL TWICE DAILY
Qty: 60 | Refills: 0 | Status: ACTIVE | COMMUNITY
Start: 2023-06-07 | End: 1900-01-01

## 2023-06-07 RX ORDER — AMIKACIN SULFATE 250 MG/ML
500 INJECTION, SOLUTION INTRAMUSCULAR; INTRAVENOUS
Qty: 6 | Refills: 3 | Status: DISCONTINUED | COMMUNITY
Start: 2021-09-21 | End: 2023-06-07

## 2023-06-07 RX ORDER — SODIUM CHLORIDE FOR INHALATION 0.9 %
0.9 VIAL, NEBULIZER (ML) INHALATION
Qty: 2 | Refills: 3 | Status: DISCONTINUED | COMMUNITY
Start: 2022-12-02 | End: 2023-06-07

## 2023-06-07 RX ORDER — LEVOFLOXACIN 500 MG/1
500 TABLET, FILM COATED ORAL DAILY
Qty: 10 | Refills: 0 | Status: DISCONTINUED | COMMUNITY
Start: 2022-02-23 | End: 2023-06-07

## 2023-06-07 RX ORDER — MEPOLIZUMAB 100 MG/ML
100 INJECTION, SOLUTION SUBCUTANEOUS
Qty: 1 | Refills: 6 | Status: DISCONTINUED | COMMUNITY
Start: 2021-04-09 | End: 2023-06-07

## 2023-06-07 RX ORDER — SODIUM CHLORIDE 9 G/ML
0.9 INJECTION, SOLUTION INTRAVENOUS
Qty: 2 | Refills: 1 | Status: DISCONTINUED | COMMUNITY
Start: 2022-06-17 | End: 2023-06-07

## 2023-06-07 RX ORDER — SODIUM CHLORIDE 9 MG/ML
0.9 INJECTION, SOLUTION INTRAMUSCULAR; INTRAVENOUS; SUBCUTANEOUS
Qty: 560 | Refills: 3 | Status: DISCONTINUED | COMMUNITY
Start: 2023-01-03 | End: 2023-06-07

## 2023-06-07 RX ORDER — PREDNISONE 10 MG/1
10 TABLET ORAL DAILY
Qty: 14 | Refills: 0 | Status: DISCONTINUED | COMMUNITY
Start: 2021-09-21 | End: 2023-06-07

## 2023-06-07 RX ORDER — CIPROFLOXACIN HYDROCHLORIDE 500 MG/1
500 TABLET, FILM COATED ORAL
Qty: 14 | Refills: 0 | Status: DISCONTINUED | COMMUNITY
Start: 2022-03-01 | End: 2023-06-07

## 2023-06-07 RX ORDER — TIOTROPIUM BROMIDE 18 UG/1
18 CAPSULE ORAL; RESPIRATORY (INHALATION) DAILY
Qty: 30 | Refills: 5 | Status: DISCONTINUED | COMMUNITY
Start: 2021-06-18 | End: 2023-06-07

## 2023-06-07 RX ORDER — TOBRAMYCIN 300 MG/4ML
300 SOLUTION RESPIRATORY (INHALATION)
Qty: 224 | Refills: 4 | Status: DISCONTINUED | COMMUNITY
Start: 2019-01-07 | End: 2023-06-07

## 2023-06-07 RX ORDER — BUDESONIDE AND FORMOTEROL FUMARATE DIHYDRATE 160; 4.5 UG/1; UG/1
160-4.5 AEROSOL RESPIRATORY (INHALATION) TWICE DAILY
Qty: 1 | Refills: 5 | Status: ACTIVE | COMMUNITY
Start: 2021-04-27 | End: 1900-01-01

## 2023-06-07 RX ORDER — PREDNISONE 10 MG/1
10 TABLET ORAL
Qty: 12 | Refills: 0 | Status: DISCONTINUED | COMMUNITY
Start: 2022-12-20 | End: 2023-06-07

## 2023-06-08 NOTE — ASSESSMENT
[FreeTextEntry1] : Recent admission for bronchiectasis exacerbation also noted to have hyponatremia and hypercapnea- acute on chronic hypercapneic hypoxemic respiratory failure.  Mental status is improved.  She does have accessory muscle use. Now with LE edema which may be secondary to salt tablets.\par Chemistry on 6/1 showed sodium of 144, HCO3 was 32 and creatinine was 0.49.\par \par We discussed that she is clinically worsening secondary to progressive lung disease.  They refused hospice care in the hospital.  Advised son that there is no cure for her respiratory disease and everything is being done including use of noninvasive ventilation.  Advised that she can use AVAPS for periods of time during the day as well to help with work of breathing.  At this time electrolytes are WNL and serum HCO3 is likely within a stable range for her as HCO3 is improved and she is alert.  concerned that NAcl is cuasing some volume retension.\par \par Plan:\par 1- respiratory:\par ADvised continye symbicort 160, 2 puffs twice daily.\par continue duonebs followed by hypersal or acetycystein alternating every 12 hours. ( total 4 x per day)\par reduced NaCl to 1 gm daily. Advised one L total fluid restriction and limit free water. .  Will repeat chemistry next week.  \par stop spiriva as she is on duonebs\par Colistin ordered- she can resume this in July\par -will order concentrator that goes to 10 L for patient to help with ambulation within her home\par .  AT REST ON 4 L NC- O2 sat IS 96%\par   WHILE AMBULATING ON 5 L CONTINUOUS FLOW- 80%\par   WHILE HOSPITALIZED REQUIRED 8-10 L TO AMBULATE TO BATHROOM AND MAINTAIN SATS >89%\par i AM REQUESTING A STATIONARY CONCENTRATOR THAT GOES TO 10 L FOR USE IN THE HOME.\par Ventolin as needed during the day.\par Azithro 500 TIW \par Prednisone 20mg daily for a few days followed by 10 daily.\par \par

## 2023-06-08 NOTE — HISTORY OF PRESENT ILLNESS
[[facility]] : Post-hospitalization from [facility] [Respiratory failure with hypoxia and hypercapnia] : Respiratory failure with hypoxia and hypercapnia [Other: _____] : [unfilled] [Yes] : Yes [Admitted on: ___] : The patient was admitted on [unfilled] [Discharged on ___] : discharged on [unfilled] [Patient Contacted By: ____] : and contacted by [unfilled] [Home] : at home, [unfilled] , at the time of the visit. 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  . She has had recurrent hospitalizations for exacerbations of bronchiectasis. \par Recently hospitalized at [facility] form 5/17-5/28/23 for lethargy.  Found to be hyponatremic to sodium 117 and pCO2 was noted to be elevated- ABG 7.20/107/60.  She was fluid restricted, given nacl tablets, treated with Zosyn and 5 day course of oral prednisone.  She was given acetylcystein and discharged on Azithromycin 500 TIW.  \par \par Currently using duonebs followed by acetylcystein Q6H without hypersal.  Also using Symbicort twice daily.  She is not using colistin.   \par \par Previous cultures have been negative for mycobacteria but have  grown Pseudomonas  resistnat to fluoro quinolones and gentamycin, ciptobramycin.\par \par Last TTE 6/2021 overall normal but unable to estimate PASP\par \par  PFTs  show severe restriction and obstruction and DLCO is reduced at well.  She had difficulty performing the maneuvers. On exercise oximetry she desaturated to 88% when walking on RA.  ON 2 L NC oxygen saturation remained in the mid 90s.  \par \par She continues to wear AVAPS nocturnally- FiO2 35%, \par EPAP:8\par Back up rate: 18\par TV:450\par Min P 20\par Max P 30\par \par ON discharge ABG was 7.34/77/60 and HCo3 was 34.  Sodium was 139.\par Also discharged on NaCl tablets 1 gm TID.  \par \par Today son reports that she is breathing quickly and using accessory muscles.  Her feet are swollen.  She is maintaining her weight at 85.  He denies fever or chills.  Oxygen saturation at rest on 4 L NC is 96%.  When she ambulates her sats drop to 80%.  Her concentrator only goes to 5 L flow.  \par

## 2023-06-12 ENCOUNTER — LABORATORY RESULT (OUTPATIENT)
Age: 68
End: 2023-06-12

## 2023-06-13 NOTE — ED PROVIDER NOTE - CLINICAL SUMMARY MEDICAL DECISION MAKING FREE TEXT BOX
65 yo F hx of diffuse cystitic bronchiectasis, primary ciliary dyskinesia, allergic bronchopulmonary aspergillosis, pseudomonas pneumonia, HTN, DM2, presently on 20 mg prednisone daily x 10 days, presenting as directed by her pulmonologist for increasing SOB x 2 weeks and fevers (max 102) x 2 days. Exam as above. Stable appearing. Concern for pneumonia. Less likely reactive airway w/o wheezing. CT scan ordered per pulm recommendation. Labs, XR, abx, ecg. Will reassess. Plan for admission to hospitalist as requested by pt's pulmonologist. fall precautions

## 2023-06-14 ENCOUNTER — NON-APPOINTMENT (OUTPATIENT)
Age: 68
End: 2023-06-14

## 2023-06-15 ENCOUNTER — LABORATORY RESULT (OUTPATIENT)
Age: 68
End: 2023-06-15

## 2023-06-21 ENCOUNTER — LABORATORY RESULT (OUTPATIENT)
Age: 68
End: 2023-06-21

## 2023-06-21 RX ORDER — INHALER, ASSIST DEVICES
SPACER (EA) MISCELLANEOUS
Qty: 1 | Refills: 0 | Status: ACTIVE | COMMUNITY
Start: 2023-06-21 | End: 1900-01-01

## 2023-06-22 ENCOUNTER — NON-APPOINTMENT (OUTPATIENT)
Age: 68
End: 2023-06-22

## 2023-07-03 ENCOUNTER — LABORATORY RESULT (OUTPATIENT)
Age: 68
End: 2023-07-03

## 2023-07-06 ENCOUNTER — NON-APPOINTMENT (OUTPATIENT)
Age: 68
End: 2023-07-06

## 2023-07-17 ENCOUNTER — APPOINTMENT (OUTPATIENT)
Dept: PULMONOLOGY | Facility: CLINIC | Age: 68
End: 2023-07-17
Payer: MEDICAID

## 2023-07-17 VITALS
RESPIRATION RATE: 17 BRPM | DIASTOLIC BLOOD PRESSURE: 64 MMHG | WEIGHT: 88 LBS | TEMPERATURE: 98.3 F | BODY MASS INDEX: 15.59 KG/M2 | SYSTOLIC BLOOD PRESSURE: 150 MMHG | OXYGEN SATURATION: 94 % | HEIGHT: 63 IN | HEART RATE: 69 BPM

## 2023-07-17 PROCEDURE — 94660 CPAP INITIATION&MGMT: CPT

## 2023-07-18 NOTE — PROCEDURE
[FreeTextEntry1] : Patient presents for mask fitting due to discomfort and severe irritation on the bridge of her nose. She had a visible red sore/scar on the bridge of her nose at the time of the visit. Patient's son states it often bleeds and looks better today because she has not used the machine in some time.\par \par Patient was fitted today with a Resmed Airfit F30 Medium full face mask. The mask appeared to have a good fit and patient found it comfortable as it removed pressure from the bridge of her nose. Patient's son was instructed on how to don/doff and adjust the mask as well as how to connect it to her machine and remove the cushion for cleaning. Patient will try the mask at home and follow up in 1-2 weeks. Will update DME order after f/u.

## 2023-08-10 ENCOUNTER — APPOINTMENT (OUTPATIENT)
Dept: PULMONOLOGY | Facility: CLINIC | Age: 68
End: 2023-08-10

## 2023-08-10 ENCOUNTER — APPOINTMENT (OUTPATIENT)
Dept: PULMONOLOGY | Facility: CLINIC | Age: 68
End: 2023-08-10
Payer: MEDICAID

## 2023-08-10 VITALS
HEART RATE: 71 BPM | RESPIRATION RATE: 15 BRPM | TEMPERATURE: 98 F | DIASTOLIC BLOOD PRESSURE: 68 MMHG | SYSTOLIC BLOOD PRESSURE: 148 MMHG | OXYGEN SATURATION: 94 %

## 2023-08-10 DIAGNOSIS — R05.9 COUGH, UNSPECIFIED: ICD-10-CM

## 2023-08-10 DIAGNOSIS — Q34.8 OTHER SPECIFIED CONGENITAL MALFORMATIONS OF RESPIRATORY SYSTEM: ICD-10-CM

## 2023-08-10 DIAGNOSIS — E87.1 HYPO-OSMOLALITY AND HYPONATREMIA: ICD-10-CM

## 2023-08-10 PROCEDURE — 99215 OFFICE O/P EST HI 40 MIN: CPT

## 2023-08-10 RX ORDER — ALBUTEROL SULFATE 90 UG/1
108 (90 BASE) INHALANT RESPIRATORY (INHALATION)
Qty: 1 | Refills: 4 | Status: ACTIVE | COMMUNITY
Start: 2022-02-23 | End: 1900-01-01

## 2023-08-10 RX ORDER — SODIUM CHLORIDE FOR INHALATION 3 %
3 VIAL, NEBULIZER (ML) INHALATION
Qty: 1 | Refills: 2 | Status: ACTIVE | COMMUNITY
Start: 2023-06-21 | End: 1900-01-01

## 2023-08-10 RX ORDER — BUDESONIDE AND FORMOTEROL FUMARATE DIHYDRATE 160; 4.5 UG/1; UG/1
160-4.5 AEROSOL RESPIRATORY (INHALATION) TWICE DAILY
Qty: 1 | Refills: 5 | Status: ACTIVE | COMMUNITY
Start: 2023-08-10 | End: 1900-01-01

## 2023-08-10 RX ORDER — OMEPRAZOLE 20 MG/1
20 CAPSULE, DELAYED RELEASE ORAL DAILY
Qty: 30 | Refills: 5 | Status: ACTIVE | OUTPATIENT
Start: 2020-06-30

## 2023-08-10 RX ORDER — FLUTICASONE PROPIONATE 50 UG/1
50 SPRAY, METERED NASAL
Qty: 1 | Refills: 6 | Status: ACTIVE | COMMUNITY
Start: 2023-06-01 | End: 1900-01-01

## 2023-08-10 RX ORDER — PREDNISONE 10 MG/1
10 TABLET ORAL
Qty: 30 | Refills: 2 | Status: ACTIVE | COMMUNITY
Start: 2023-08-10 | End: 1900-01-01

## 2023-08-11 ENCOUNTER — NON-APPOINTMENT (OUTPATIENT)
Age: 68
End: 2023-08-11

## 2023-08-11 DIAGNOSIS — J96.92 RESPIRATORY FAILURE, UNSPECIFIED WITH HYPERCAPNIA: ICD-10-CM

## 2023-08-11 PROBLEM — R05.9 COUGH: Status: ACTIVE | Noted: 2021-03-29

## 2023-08-11 PROBLEM — Q34.8: Status: ACTIVE | Noted: 2019-07-01

## 2023-08-11 PROBLEM — E87.1 HYPONATREMIA: Status: ACTIVE | Noted: 2023-05-30

## 2023-08-11 LAB
ALBUMIN SERPL ELPH-MCNC: 3.6 G/DL
ALP BLD-CCNC: 252 U/L
ALT SERPL-CCNC: 39 U/L
ANION GAP SERPL CALC-SCNC: 6 MMOL/L
AST SERPL-CCNC: 30 U/L
BILIRUB SERPL-MCNC: 0.2 MG/DL
BUN SERPL-MCNC: 19 MG/DL
CALCIUM SERPL-MCNC: 9.4 MG/DL
CHLORIDE SERPL-SCNC: 97 MMOL/L
CO2 SERPL-SCNC: 36 MMOL/L
CREAT SERPL-MCNC: 0.43 MG/DL
EGFR: 107 ML/MIN/1.73M2
GLUCOSE SERPL-MCNC: 311 MG/DL
POTASSIUM SERPL-SCNC: 5 MMOL/L
PROT SERPL-MCNC: 6.7 G/DL
SODIUM SERPL-SCNC: 140 MMOL/L

## 2023-08-11 NOTE — ASSESSMENT
[FreeTextEntry1] : Mrs. Sky returns for follow up accompanied by her son today.  Post admission in May for bronchiectasis exacerbation also noted to have hyponatremia and hypercapnea- acute on chronic hypercapneic hypoxemic respiratory failure. She has been using AVAPS nocturnally, is adherent to clearance regimen and is on chronic prednisone therapy which helps control her symptoms-10mg daily.  Also on azithromycin 500 TIW.  Hyponatremia improved and last visit I started decreasing salt tablets as she was developing increasing edema while monitoring her serum Na. Currently on one salt tablet every other day.  She has been taking NaHCO3 prescribed by PCP for GI issues.  I advised son to stop administration as it could worsen alkalosis and lead to CO2 retention. ON exam today is not in distress. VS are stable Oxygen saturation is 94% on 4l NC. Lungs with scattered rales but no wheezing. Mild pedal edema is noted. We discussed that she is clinically worsening secondary to progressive lung disease.  They refused hospice care in   Plan: 1- respiratory: ADvised continye symbicort 160, 2 puffs twice daily. continue duonebs followed by hypersal ( total 4 x per day) Will advise on  NaCl administration after reviewing labs to be repeated today. Advised one L total fluid restriction and limit free water. Stop NaHCO3. stop spiriva as she is on duonebs Continue Colistin every other month. . Ventolin as needed during the day. Azithro 500 TIW  Prednisone 10 daily. CF and PCD screen repeated today.

## 2023-08-11 NOTE — PHYSICAL EXAM
[No Acute Distress] : no acute distress [Normal Appearance] : normal appearance [No Neck Mass] : no neck mass [Normal Rate/Rhythm] : normal rate/rhythm [Normal S1, S2] : normal s1, s2 [No Murmurs] : no murmurs [No Resp Distress] : no resp distress [Rales] : rales [No Clubbing] : no clubbing [No Cyanosis] : no cyanosis [Oriented x3] : oriented x3 [Normal Affect] : normal affect [TextBox_105] : pedal edema, no edema at ankles or shins

## 2023-08-11 NOTE — PROCEDURE
[FreeTextEntry1] : MASK FITTING: Patient returns for mask fitting due to persistent leak and discomfort with the Resmed F30 ffm fitted during her last visit. Patient states the mask pushes up too hard on her nose, but if she loosens it, it begins leaking. She still has a visible scar on the bridge of her nose from her previous mask. Patient was refitted today with a RESMED AIRTOUCH F20 MEDIUM FFM. The mask appeared to have a good fit and patient seemed to tolerate it well. Patient will try the mask at home and follow up.  Time spent w/ patient - 15 minutes

## 2023-08-11 NOTE — HISTORY OF PRESENT ILLNESS
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  . She has had recurrent hospitalizations for exacerbations of bronchiectasis.  Recently hospitalized at [facility] form 5/17-5/28/23 for lethargy.  Found to be hyponatremic to sodium 117 and pCO2 was noted to be elevated- ABG 7.20/107/60.  She was fluid restricted, given nacl tablets, treated with Zosyn and 5 day course of oral prednisone.  She was given acetylcystein and discharged on Azithromycin 500 TIW.    At last visit she was using duonebs followed by acetylcystein Q6H without hypersal.  Also using Symbicort twice daily.  She is not using colistin.     Previous cultures have been negative for mycobacteria but have  grown Pseudomonas  resistnat to fluoro quinolones and gentamycin, ciptobramycin.   Last TTE 6/2021 overall normal but unable to estimate PASP   PFTs  show severe restriction and obstruction and DLCO is reduced at well.  She had difficulty performing the maneuvers. On exercise oximetry she desaturated to 88% when walking on RA.  ON 2 L NC oxygen saturation remained in the mid 90s.    She continues to wear AVAPS nocturnally- FiO2 35%,  EPAP:8 Back up rate: 18 TV:450 Min P 20 Max P 30  ON discharge ABG was 7.34/77/60 and HCo3 was 34.  Sodium was 139. Also discharged on NaCl tablets 1 gm TID.    At last visit I ordered a 10 L concentrator for her to use higher flows when ambulating, advised stopping mucomyst and continuing hypersal.  I reduced sodium tablets and sodium remained WNL on last chem7 at 138,  HCO3 was 34 at that time and stable.   Returns for follow up today:  NO hospitalizations since May.  Very debilitated at home, very SOB, having difficulty ambulating.    son reports that she feels as if she is feilling up with water whever she drinks but she can eat and doesn't feel that. Legs are swollen. Having skin breakdown on the bridge of her nose.  Cannot wear mask for entire night, she is also missing nights.   Has been on prednisone 10mg daily for the last few weeks and it helps her breathing.    Will renew multiple meds today.  Son asked for refill on sodium bicarbonate tablets that were prescribed for GI irritation.

## 2023-08-14 NOTE — PHYSICAL THERAPY INITIAL EVALUATION ADULT - PATIENT/FAMILY AGREES WITH PLAN
Pt and pt's family in agreement with plan of care/yes negative normal/ROM intact/normal gait/strength 5/5 bilateral upper extremities/strength 5/5 bilateral lower extremities

## 2023-08-17 NOTE — ED PROVIDER NOTE - CONSTITUTIONAL NOURISHMENT, MLM
Physician Documentation                                                                           

 [facility]                                                                 

Name: Domitila Hamilton                                                                               

Age: 34 yrs                                                                                       

Sex: Female                                                                                       

: 1988                                                                                   

MRN: [id number]                                                                                   

Arrival Date: 2023                                                                          

Time: 15:53                                                                                       

Account#: [id number]                                                                            

Bed 12                                                                                            

Private MD:                                                                                       

ED Physician Mamadou Anne                                                                      

HPI:                                                                                              

                                                                                             

16:24 This 34 yrs old Female presents to ER via Unassigned with complaints of Anxiety.        kb  

16:24 The patient presents to the emergency department with anxiety. Onset: The               kb  

      symptoms/episode began/occurred 3 day(s) ago. Past psychiatric history: Prior               

      diagnosis: depression. Associated signs and symptoms: Pertinent positives; anxiety.         

      Severity of symptoms: At their worst the symptoms were moderate in the emergency            

      department the symptoms are unchanged. The patient has experienced similar episodes in      

      the past. The patient has not recently seen a physician. Patient is a 34-year-old           

      female who presents for panic attacks that have been going on for 3 days. States she        

      has a history of anxiety and depression, previously prescribed Prozac 40 mg p.o. daily      

      but was unable to afford it so she quit taking it about a year ago. States she does not     

      have suicidal or homicidal ideations but also feels that she should not be alone.           

      States she is stressed out and that is what is bringing on her panic attacks..              

                                                                                                  

Historical:                                                                                       

- Allergies:                                                                                      

16:38 No Known Allergies;                                                                     cm10

- Home Meds:                                                                                      

16:38 None [Active];                                                                          cm10

- PMHx:                                                                                           

16:38 Anxiety; Depressive disorder;                                                           cm10

- PSHx:                                                                                           

16:38 None;                                                                                   cm10

                                                                                                  

- Immunization history:: Adult Immunizations unknown.                                             

- Social history:: Smoking status: Patient denies any tobacco usage or history of.                

                                                                                                  

                                                                                                  

ROS:                                                                                              

16:25 Constitutional: Negative for fever, chills, and weight loss.                            kb  

16:25 Psych: Positive for anxiety.                                                                

16:25 All other systems are negative.                                                             

                                                                                                  

Exam:                                                                                             

16:25 Constitutional:  This is a well developed, well nourished patient who is awake, alert,  kb  

      and in no acute distress. Head/Face:  Normocephalic, atraumatic. ENT:  Moist Mucous         

      membranes Cardiovascular:  Regular rate and rhythm with a normal S1 and S2.  No             

      gallops, murmurs, or rubs.  No pulse deficits. Respiratory:  Respirations even and          

      unlabored. No increased work of breathing. Talking in full sentences Skin:  Warm, dry       

      with normal turgor.  Normal color. MS/ Extremity:  Pulses equal, no cyanosis.               

      Neurovascular intact.  Full, normal range of motion. Neuro:  Awake and alert, GCS 15,       

      oriented to person, place, time, and situation. Moves all extremities. Normal gait.         

16:25 Psych: Behavior/mood is cooperative, anxious, Affect is animated, Oriented to person,       

      place, time, Patient has no thoughts/intents to harm self or others.                        

23:12 ECG was reviewed by the Attending Physician.                                            radha  

                                                                                                  

Vital Signs:                                                                                      

16:36  / 84; Pulse 106; Resp 18; Temp 98.2; Pulse Ox 100% ; Weight 53.52 kg; Height 5   cm10

      ft. 4 in. ;                                                                                 

21:27  / 85; Pulse 91; Resp 18; Temp 98.5(O); Pulse Ox 100% on R/A;                     kd3 

22:33  / 83; Pulse 86; Resp 18; Temp 99(TE); Pulse Ox 100% ;                            kd3 

23:36  / 69; Pulse 81; Resp 16; Pulse Ox 100% on R/A;                                   kd3 

16:36 Body Mass Index 20.25 (53.52 kg, 162.56 cm)                                             cm10

                                                                                                  

MDM:                                                                                              

15:58 Patient medically screened.                                                               

16:26 Differential diagnosis: depression, anxiety, acute stress reaction, panic attack. Data  radha  

      reviewed: vital signs, nurses notes.                                                        

21:41 ED course: Pt medically cleared. [facility] called out for evaluation.                  radha  

                                                                                             

01:11 Management of patient was discussed with the following: Behavioral Health Provider:     radha  

      [facility] recommends outpatient treatment and has pt set up for follow up. Counseling:     

      I had a detailed discussion with the patient and/or guardian regarding the historical       

      points, exam findings, and any diagnostic results supporting the discharge/admit            

      diagnosis, lab results, the need for outpatient follow up, a psychiatrist, to return to     

      the emergency department if symptoms worsen or persist or if there are any questions or     

      concerns that arise at home.                                                                

                                                                                                  

                                                                                             

16:11 Order name: Acetaminophen; Complete Time: 21:34                                         radha  

                                                                                             

16:11 Order name: Basic Metabolic Panel; Complete Time: 21:34                                 radha  

                                                                                             

16:11 Order name: CBC with Diff; Complete Time: 21:14                                         radha  

                                                                                             

16:11 Order name: ETOH Level; Complete Time: 21:18                                            kb  

                                                                                             

16:11 Order name: Hepatic Function; Complete Time: 21:34                                      kb  

                                                                                             

16:11 Order name: PT-INR; Complete Time: 21:15                                                kb  

                                                                                             

16:11 Order name: Pregnancy Test, Urine; Complete Time: 21:08                                 kb  

                                                                                             

16:11 Order name: Ptt, Activated; Complete Time: 21:15                                        kb  

                                                                                             

16:11 Order name: Salicylate; Complete Time: 21:15                                            kb  

                                                                                             

16:11 Order name: Urinalysis w/ reflexes; Complete Time: 21:08                                kb  

                                                                                             

16:11 Order name: Urine Drug Screen; Complete Time: 21:15                                     kb  

                                                                                             

16:11 Order name: EKG; Complete Time: 16:11                                                   kb  

                                                                                             

16:11 Order name: EKG - Nurse/Tech; Complete Time: 21:16                                      kb  

                                                                                             

16:11 Order name: IV Saline Lock; Complete Time: 20:54                                        kb  

                                                                                             

16:11 Order name: Labs collected and sent; Complete Time: 20:54                               kb  

                                                                                             

16:11 Order name: Suicide Screening ([place]); Complete Time: 21:28                          kb  

                                                                                                  

EC/16                                                                                             

23:12 Rate is 92 beats/min. Rhythm is regular. QRS Axis is Normal. ME interval is normal at   kb  

      132 msec. QRS interval is normal at 76 msec. QT interval is normal at 450 msec.             

                                                                                                  

Administered Medications:                                                                         

16:46 Drug: LORazepam PO 1 mg Route: PO;                                                      cm10

21:50 Drug: hydrOXYzine PO 25 mg Route: PO;                                                   kd3 

                                                                                                  

                                                                                                  

Disposition Summary:                                                                              

23 01:12                                                                                    

Discharge Ordered                                                                                 

      Location: Home                                                                          kb  

      Condition: Stable                                                                       kb  

      Diagnosis                                                                                   

        - Anxiety disorder, unspecified                                                       kb  

        - Acute stress reaction                                                               kb  

      Followup:                                                                               kb  

        - With: Emergency Department                                                               

        - When: As needed                                                                          

        - Reason: Worsening of condition                                                           

      Followup:                                                                               kb  

        - With: Private Physician                                                                  

        - When: 2 - 3 days                                                                         

        - Reason: Recheck today's complaints, Continuance of care, Re-evaluation by your           

      physician                                                                                   

      Discharge Instructions:                                                                     

        - Discharge Summary Sheet                                                             kb  

        - Panic Attack, Easy-to-Read                                                          kb  

      Forms:                                                                                      

        - Medication Reconciliation Form                                                      kb  

        - Thank You Letter                                                                    kb  

        - Antibiotic Education                                                                kb  

        - Prescription Opioid Use                                                             kb  

        - Patient Portal Instructions                                                         kb  

        - Leadership Thank You Letter                                                         kb  

Signatures:                                                                                       

Dispatcher [employer]                           Jill Vernon, JUANITA-C                 FNP-Callie Dan RN                      RN   kd3                                                  

Kallie Maya RN                  RN   cm10                                                 

                                                                                                  

Corrections: (The following items were deleted from the chart)                                    

 16: Allergies: No Known Allergies; cm10                                               cm10

 16: Home Meds: None; cm10                                                             cm10

: PMHx: None; cm10                                                                  cm10

: PSHx: None; cm10                                                                  cm10

 16: Immunization history: Adult Immunizations unknown, cm10                           cm10

 16: Social history: Smoking status: unknown cm10                                      cm10

                                                                                                  

************************************************************************************************** [employer]

## 2023-09-23 NOTE — ED ADULT NURSE NOTE - NSIMPLEMENTINTERV_GEN_ALL_ED
Yes Implemented All Fall Risk Interventions:  [place] to call system. Call bell, personal items and telephone within reach. Instruct patient to call for assistance. Room bathroom lighting operational. Non-slip footwear when patient is off stretcher. Physically safe environment: no spills, clutter or unnecessary equipment. Stretcher in lowest position, wheels locked, appropriate side rails in place. Provide visual cue, wrist band, yellow gown, etc. Monitor gait and stability. Monitor for mental status changes and reorient to person, place, and time. Review medications for side effects contributing to fall risk. Reinforce activity limits and safety measures with patient and family.

## 2023-09-25 NOTE — ED ADULT NURSE NOTE - NS_SISCREENINGSR_GEN_ALL_ED
Patient seen and examined at bedside. Was downgraded to the floor from stepdown, no longer requiring BiPAP. 5 beat run of vtac overnight, asympomatic and normal electrolytes. LEANNE meléndez removed this morning. No acute complaints this morning.     MEDICATIONS  (STANDING):  allopurinol 100 milliGRAM(s) Oral daily  aspirin  chewable 81 milliGRAM(s) Oral daily  atorvastatin 40 milliGRAM(s) Oral at bedtime  budesonide  80 MICROgram(s)/formoterol 4.5 MICROgram(s) Inhaler 2 Puff(s) Inhalation two times a day  calcitriol   Capsule 0.25 MICROGram(s) Oral daily  calcium acetate 667 milliGRAM(s) Oral three times a day with meals  chlorhexidine 2% Cloths 1 Application(s) Topical <User Schedule>  epoetin thelma (EPOGEN) Injectable 73130 Unit(s) IV Push <User Schedule>  heparin   Injectable 5000 Unit(s) SubCutaneous every 8 hours  magnesium oxide 400 milliGRAM(s) Oral three times a day with meals  meropenem Injectable 500 milliGRAM(s) IV Push every 12 hours  metoprolol tartrate 12.5 milliGRAM(s) Oral daily  midodrine. 10 milliGRAM(s) Oral three times a day  pantoprazole    Tablet 40 milliGRAM(s) Oral before breakfast  PARoxetine 20 milliGRAM(s) Oral daily    MEDICATIONS  (PRN):  acetaminophen     Tablet .. 650 milliGRAM(s) Oral every 6 hours PRN Temp greater or equal to 38C (100.4F), Mild Pain (1 - 3)  albuterol/ipratropium for Nebulization 3 milliLiter(s) Nebulizer every 6 hours PRN Shortness of Breath  melatonin 3 milliGRAM(s) Oral at bedtime PRN Insomnia  ondansetron Injectable 4 milliGRAM(s) IV Push every 8 hours PRN Nausea and/or Vomiting  sodium chloride 0.9% lock flush 10 milliLiter(s) IV Push every 1 hour PRN Pre/post blood products, medications, blood draw, and to maintain line patency    Vital Signs Last 24 Hrs  T(C): 36.8 (25 Sep 2023 04:53), Max: 37 (24 Sep 2023 08:00)  T(F): 98.3 (25 Sep 2023 04:53), Max: 98.6 (24 Sep 2023 08:00)  HR: 98 (25 Sep 2023 04:53) (75 - 98)  BP: 108/54 (25 Sep 2023 04:53) (98/54 - 130/53)  BP(mean): 82 (25 Sep 2023 00:06) (56 - 82)  RR: 16 (25 Sep 2023 04:53) (16 - 20)  SpO2: 95% (25 Sep 2023 04:53) (90% - 97%)    Parameters below as of 25 Sep 2023 04:53  Patient On (Oxygen Delivery Method): nasal cannula  O2 Flow (L/min): 2                          8.6    9.87  )-----------( 86       ( 23 Sep 2023 09:04 )             29.1   09-25    140  |  100  |  26.8<H>  ----------------------------<  80  4.1   |  29.0  |  3.31<H>    Ca    9.0      25 Sep 2023 04:40  Phos  3.6     09-25  Mg     1.9     09-25    TPro  x   /  Alb  3.0<L>  /  TBili  x   /  DBili  x   /  AST  x   /  ALT  x   /  AlkPhos  x   09-23    Exam:  Gen: pt lying in bed, a bit lethargic, in NAD  HEENT: LEANNE meléndez removed, good hemostasis after manual pressure  Resp: unlabored, currently on BiPAP  CVS: RRR  Abd: soft, NT, ND  Ext: moving all extremities spontaneously, sensation intact, pulses 2+. LUE Vac in place holding suction. Wound without evidence of surrounding infection   Negative

## 2023-09-29 RX ORDER — COLISTIMETHATE SODIUM 150 MG/1
150 INJECTION, POWDER, FOR SOLUTION INTRAMUSCULAR; INTRAVENOUS
Qty: 56 | Refills: 3 | Status: ACTIVE | COMMUNITY
Start: 2022-10-21 | End: 1900-01-01

## 2023-10-05 RX ORDER — SODIUM CHLORIDE 9 G/ML
0.9 INJECTION, SOLUTION INTRAVENOUS
Qty: 1 | Refills: 5 | Status: ACTIVE | COMMUNITY
Start: 2022-10-26 | End: 1900-01-01

## 2023-10-11 NOTE — DISCHARGE NOTE NURSING/CASE MANAGEMENT/SOCIAL WORK - NSDCPETBCESMAN_GEN_ALL_CORE
Subjective:      Patient ID: Prasanna Ash is a 69 y.o. female who presents today for:  Chief Complaint   Patient presents with    Follow-up     Insurance did not cover Aleshia Benitez and she states that she is not sure if she wants to take medication due to her body reacting odd to different things with inflammatory things. Pt states that her mobility is declining greatly. She is starting to drop things as well. She states that even sitting is getting harder for her as well. HPI 43-year-old right-handed female with a known history of multiple sclerosis. Patient has postviral fatigue syndrome and tremors and ataxia with the same. Since last seen she did obtain Multiple blood tests as we are contemplating neuro immunomodulators started at age 27 and then she was treated with Novantrone with Sequent side effects. She was in [place] and was given IVIG and Copaxone and the last treatment was rituximab. Her EDSS score is 7-8. Our only other option we had recommended was to do Ocrevus or Susen Dayanna. Since she was agreeable to Susen Dayanna. Insurance did not cover Susen Dayanna and she is not quite sure if she wants to take medication due to body reacting differently. This is reasonable at this age and we may have to just consider her for steroid treatments intermittently. Some fatigue and paraparesis. She still has good strength in the upper extremities. Past Medical History:   Diagnosis Date    Chronic pain syndrome     Multiple sclerosis (HCC)     Orthostatic hypotension     Osteoporosis 05/29/2018    age-related w/o current pathological fracture    Other lack of coordination     Spinal stenosis, cervical region     Unspecified retinal detachment with retinal break, left eye      No past surgical history on file. Social History     Socioeconomic History    Marital status:       Spouse name: Not on file    Number of children: Not on file    Years of education: Not on file    Highest education level:
If you are a smoker, it is important for your health to stop smoking. Please be aware that second hand smoke is also harmful.

## 2023-10-17 ENCOUNTER — RX RENEWAL (OUTPATIENT)
Age: 68
End: 2023-10-17

## 2023-11-08 RX ORDER — PREDNISONE 10 MG/1
10 TABLET ORAL
Qty: 90 | Refills: 2 | Status: ACTIVE | COMMUNITY
Start: 2023-11-08 | End: 1900-01-01

## 2023-11-08 RX ORDER — IPRATROPIUM BROMIDE AND ALBUTEROL SULFATE 2.5; .5 MG/3ML; MG/3ML
0.5-2.5 (3) SOLUTION RESPIRATORY (INHALATION)
Qty: 1 | Refills: 10 | Status: ACTIVE | COMMUNITY
Start: 2023-06-07 | End: 1900-01-01

## 2023-11-16 RX ORDER — SYRINGE, DISPOSABLE, 1 ML
20G X 1" SYRINGE, EMPTY DISPOSABLE MISCELLANEOUS
Qty: 100 | Refills: 2 | Status: ACTIVE | COMMUNITY
Start: 2023-01-10 | End: 1900-01-01

## 2023-11-16 RX ORDER — SYRINGE WITH NEEDLE, 6 ML 22GX1 1/2"
22G X 1-1/2" SYRINGE, EMPTY DISPOSABLE MISCELLANEOUS
Qty: 100 | Refills: 6 | Status: DISCONTINUED | COMMUNITY
Start: 2022-10-26 | End: 2023-11-16

## 2023-11-16 NOTE — REASON FOR VISIT
Like to see a neurologist regards to this. We did have him set up in the past but I believe he had his coronary event at that point. Continues on carbidopa levodopa. He currently is doing therapy with regards to his Parkinson's and is doing very well. Has had no falls. Ambulatory dysfunction is clearly impacted by his Parkinson's. [Follow-Up] : a follow-up visit

## 2023-11-22 RX ORDER — SODIUM CHLORIDE FOR INHALATION 3.5 %
3.5 VIAL, NEBULIZER (ML) INHALATION
Qty: 3 | Refills: 11 | Status: ACTIVE | COMMUNITY
Start: 2021-04-27 | End: 1900-01-01

## 2023-11-23 ENCOUNTER — NON-APPOINTMENT (OUTPATIENT)
Age: 68
End: 2023-11-23

## 2023-11-24 RX ORDER — SODIUM CHLORIDE FOR INHALATION 0.9 %
0.9 VIAL, NEBULIZER (ML) INHALATION
Qty: 2 | Refills: 5 | Status: ACTIVE | COMMUNITY
Start: 2023-11-24 | End: 1900-01-01

## 2023-12-05 RX ORDER — ACETYLCYSTEINE 100 MG/ML
10 SOLUTION ORAL; RESPIRATORY (INHALATION)
Qty: 200 | Refills: 5 | Status: ACTIVE | COMMUNITY
Start: 2023-06-08 | End: 1900-01-01

## 2023-12-05 RX ORDER — PREDNISONE 10 MG/1
10 TABLET ORAL DAILY
Qty: 30 | Refills: 0 | Status: ACTIVE | COMMUNITY
Start: 2021-09-07 | End: 1900-01-01

## 2023-12-28 RX ORDER — AZITHROMYCIN 500 MG/1
500 TABLET, FILM COATED ORAL
Qty: 5 | Refills: 1 | Status: ACTIVE | COMMUNITY
Start: 2023-06-07 | End: 1900-01-01

## 2023-12-29 DIAGNOSIS — J47.9 BRONCHIECTASIS, UNCOMPLICATED: ICD-10-CM

## 2024-01-05 ENCOUNTER — NON-APPOINTMENT (OUTPATIENT)
Age: 69
End: 2024-01-05

## 2024-01-18 NOTE — CONSULT NOTE ADULT - SUBJECTIVE AND OBJECTIVE BOX
"Received call from mom. Pt and sibling having similar symptoms of \"pus coming out of their eye. Eye looks red\". Sent home from school yesterday. Eyes crusted shut this morning. Looks like it's starting to spread into both eyes. Mom stated other sibling recently had pink eye, so aware of treatment/protocol for contagiousness. Pharmacy verified, rx placed. Please sign.   " 67 yo F PMH cystic bronchiectasis, PCD, ABPA, prior pseudomonas , stenotrophomonas pna, HTN, and T2DM presenting for subacute worsening shortness of breath and fevers. Of note, patient with multiple hospitalizations for bronchiectasis - most recently from 8/10-8/19 tx with solumedrol as well as zosyn/inhaled tobramycin. SCx at that time w/ pseudomonas and transitioned to levaquin.    CHIEF COMPLAINT:    HPI:  65 yo F PMH cystic bronchiectasis, PCD, ABPA, prior pseudomonas , stenotrophomonas pna, HTN, and T2DM presenting for subacute worsening shortness of breath and fevers. Of note, patient with multiple hospitalizations for bronchiectasis - most recently from 8/10-8/19 tx with solumedrol as well as zosyn/inhaled tobramycin. SCx at that time w/ pseudomonas (Int resistance to cipro) and transitioned to levaquin. Over the past few weeks reporting inc cough/sputum production. Takes budesonide and performist BID as well as albuterol 2-3x daily. Prescribed inhaled tobra, though with noncompliance. Has been taking pred 20mg BID x10 days PTA. Days prior to admission noted to have fevers to 101-102. Outpatient labs w/ leukocytosis of 15. Procal WNL. CRP 51. Given c/f bronchiectasis exacerbation w/ history of resistant bacteria referred for ED evaluation. Upon arrival AF and normotensive though tachypnea with saturations of 98% on 2LNC. CT w/ new KELLY GGOs compared to prior. Tx w/ zosyn and admitted for pna.     PAST MEDICAL & SURGICAL HISTORY:  ABPA (allergic bronchopulmonary aspergillosis)    Bronchiectasis    Hypertension    Vitamin D deficiency    Microcytic anemia    GERD (gastroesophageal reflux disease)    Postnasal drip    Pseudomonas aeruginosa colonization    Allergic rhinitis    Recurrent pneumonia    Type 2 diabetes mellitus, with long-term current use of insulin    History of cholecystectomy        FAMILY HISTORY:  Family history of diabetes mellitus  father    Family history of allergic rhinitis (Child, Child)  son, daughter    Family history of bronchitis  parents        SOCIAL HISTORY:  Smoking: [ ] Never Smoked [ ] Former Smoker (__ packs x ___ years) [ ] Current Smoker  (__ packs x ___ years)  Substance Use: [ ] Never Used [ ] Used ____  EtOH Use:  Marital Status: [ ] Single [ ]  [ ]  [ ]   Sexual History:   Occupation:  Recent Travel:  Country of Birth:  Advance Directives:    Allergies    No Known Allergies    Intolerances        HOME MEDICATIONS:  Home Medications:  Admelog SoloStar 100 units/mL injectable solution: 2 unit(s) injectable 3 times a day (before meals) (01 Apr 2021 03:24)  albuterol 2.5 mg/3 mL (0.083%) inhalation solution: 3 milliliter(s) inhaled every 6 hours, As Needed (01 Apr 2021 03:24)  albuterol 90 mcg/inh inhalation aerosol: 1 to 2 puff(s) inhaled every 4 to 6 hours, As Needed (01 Apr 2021 03:24)  amLODIPine 5 mg oral tablet: 1 tab(s) orally once a day (01 Apr 2021 03:24)  Basaglar KwikPen 100 units/mL subcutaneous solution: 10 unit(s) subcutaneous once a day (at bedtime) (01 Apr 2021 03:24)  bisacodyl 5 mg oral delayed release tablet: 1 tab(s) orally 2 times a day, As Needed (01 Apr 2021 03:24)  budesonide 0.5 mg/2 mL inhalation suspension: 2 milliliter(s) inhaled every 12 hours (01 Apr 2021 03:24)  Calcium 600+D oral tablet: 1 tab(s) orally once a day (01 Apr 2021 03:24)  ferrous gluconate 324 mg (37.5 mg elemental iron) oral tablet: 1 tab(s) orally 2 times a day (01 Apr 2021 03:24)  Flonase 50 mcg/inh nasal spray: 1 spray(s) in each nostril once a day (01 Apr 2021 03:24)  metFORMIN 1000 mg oral tablet: 1 tab(s) orally 2 times a day (01 Apr 2021 02:05)  montelukast 10 mg oral tablet: 1 tab(s) orally once a day (01 Apr 2021 03:24)  NebuSal: 4 milliliter(s) inhaled 2 times a day (01 Apr 2021 03:24)  omeprazole 20 mg oral delayed release capsule: 1 cap(s) orally once a day (01 Apr 2021 03:24)  Perforomist 20 mcg/2 mL inhalation solution: 2 milliliter(s) inhaled every 12 hours (01 Apr 2021 03:24)  predniSONE 20 mg oral tablet: 1 tab(s) orally 2 times a day (01 Apr 2021 03:24)  Senna 8.6 mg oral tablet: 1 tab(s) orally 2 times a day (01 Apr 2021 03:24)  Vitamin C 500 mg oral tablet: 1 tab(s) orally once a day (01 Apr 2021 03:24)  Vitamin D2 50,000 intl units (1.25 mg) oral capsule: 1 cap(s) orally once a week (01 Apr 2021 03:24)      REVIEW OF SYSTEMS:  Constitutional: [ ] negative [ ] fevers [ ] chills [ ] weight loss [ ] weight gain  HEENT: [ ] negative [ ] dry eyes [ ] eye irritation [ ] postnasal drip [ ] nasal congestion  CV: [ ] negative  [ ] chest pain [ ] orthopnea [ ] palpitations [ ] murmur  Resp: [ ] negative [ ] cough [ ] shortness of breath [ ] dyspnea [ ] wheezing [ ] sputum [ ] hemoptysis  GI: [ ] negative [ ] nausea [ ] vomiting [ ] diarrhea [ ] constipation [ ] abd pain [ ] dysphagia   : [ ] negative [ ] dysuria [ ] nocturia [ ] hematuria [ ] increased urinary frequency  Musculoskeletal: [ ] negative [ ] back pain [ ] myalgias [ ] arthralgias [ ] fracture  Skin: [ ] negative [ ] rash [ ] itch  Neurological: [ ] negative [ ] headache [ ] dizziness [ ] syncope [ ] weakness [ ] numbness  Psychiatric: [ ] negative [ ] anxiety [ ] depression  Endocrine: [ ] negative [ ] diabetes [ ] thyroid problem  Hematologic/Lymphatic: [ ] negative [ ] anemia [ ] bleeding problem  Allergic/Immunologic: [ ] negative [ ] itchy eyes [ ] nasal discharge [ ] hives [ ] angioedema  [ ] All other systems negative  [ ] Unable to assess ROS because ________    OBJECTIVE:  ICU Vital Signs Last 24 Hrs  T(C): 36.7 (01 Apr 2021 07:17), Max: 36.8 (01 Apr 2021 04:39)  T(F): 98 (01 Apr 2021 07:17), Max: 98.2 (01 Apr 2021 04:39)  HR: 78 (01 Apr 2021 07:17) (74 - 98)  BP: 136/81 (01 Apr 2021 07:17) (128/71 - 145/77)  BP(mean): --  ABP: --  ABP(mean): --  RR: 17 (01 Apr 2021 07:17) (17 - 24)  SpO2: 97% (01 Apr 2021 07:17) (97% - 100%)        CAPILLARY BLOOD GLUCOSE      POCT Blood Glucose.: 196 mg/dL (01 Apr 2021 04:08)      PHYSICAL EXAM:  General:   HEENT:   Lymph Nodes:  Neck:   Respiratory:   Cardiovascular:   Abdomen:   Extremities:   Skin:   Neurological:  Psychiatry:    HOSPITAL MEDICATIONS:  Standing Meds:  buDESOnide    Inhalation Suspension 0.5 milliGRAM(s) Inhalation every 12 hours  dextrose 40% Gel 15 Gram(s) Oral once  dextrose 5%. 1000 milliLiter(s) IV Continuous <Continuous>  dextrose 5%. 1000 milliLiter(s) IV Continuous <Continuous>  dextrose 50% Injectable 25 Gram(s) IV Push once  dextrose 50% Injectable 12.5 Gram(s) IV Push once  dextrose 50% Injectable 25 Gram(s) IV Push once  enoxaparin Injectable 40 milliGRAM(s) SubCutaneous daily  glucagon  Injectable 1 milliGRAM(s) IntraMuscular once  influenza   Vaccine 0.5 milliLiter(s) IntraMuscular once  insulin glargine Injectable (LANTUS) 8 Unit(s) SubCutaneous at bedtime  insulin lispro (ADMELOG) corrective regimen sliding scale   SubCutaneous three times a day before meals  insulin lispro (ADMELOG) corrective regimen sliding scale   SubCutaneous at bedtime  montelukast 10 milliGRAM(s) Oral daily  pantoprazole    Tablet 40 milliGRAM(s) Oral before breakfast  piperacillin/tazobactam IVPB.. 3.375 Gram(s) IV Intermittent every 8 hours  polyethylene glycol 3350 17 Gram(s) Oral daily  predniSONE   Tablet 20 milliGRAM(s) Oral two times a day  senna 2 Tablet(s) Oral at bedtime  sodium chloride 3%  Inhalation 4 milliLiter(s) Inhalation two times a day  venlafaxine XR. 37.5 milliGRAM(s) Oral daily      PRN Meds:  ALBUTerol    0.083% 2.5 milliGRAM(s) Nebulizer every 6 hours PRN      LABS:                        13.2   9.03  )-----------( 207      ( 01 Apr 2021 08:07 )             42.1     Hgb Trend: 13.2<--, 11.7<--  03-31    136  |  98  |  22  ----------------------------<  286<H>  4.9   |  30  |  0.51    Ca    9.8      31 Mar 2021 19:32    TPro  6.9  /  Alb  3.3  /  TBili  <0.2  /  DBili  x   /  AST  27  /  ALT  41<H>  /  AlkPhos  189<H>  03-31    Creatinine Trend: 0.51<--        Venous Blood Gas:  03-31 @ 19:32  7.37/52/70/28/86.0  VBG Lactate: 1.1      MICROBIOLOGY:       RADIOLOGY:  [ ] Reviewed and interpreted by me    PULMONARY FUNCTION TESTS:    EKG:   CHIEF COMPLAINT:    HPI:  67 yo F PMH cystic bronchiectasis, PCD, ABPA, prior pseudomonas , stenotrophomonas pna, HTN, and T2DM presenting for subacute worsening shortness of breath and fevers. Of note, patient with multiple hospitalizations for bronchiectasis - most recently from 8/10-8/19 tx with solumedrol as well as zosyn/inhaled tobramycin. SCx at that time w/ pseudomonas (Int resistance to cipro) and transitioned to levaquin. Over the past few weeks reporting inc cough/sputum production. Takes budesonide and performist BID as well as albuterol 2-3x daily. Prescribed inhaled tobra, though with noncompliance. Has been taking pred 20mg BID x10 days PTA. Days prior to admission noted to have fevers to 101-102. Outpatient labs w/ leukocytosis of 15. Procal WNL. CRP 51. Given c/f bronchiectasis exacerbation w/ history of resistant bacteria referred for ED evaluation. Upon arrival AF and normotensive though tachypnea with saturations of 98% on 2LNC. CT w/ new KELLY GGOs compared to prior. Tx w/ zosyn and admitted for pna.     PAST MEDICAL & SURGICAL HISTORY:  ABPA (allergic bronchopulmonary aspergillosis)    Bronchiectasis    Hypertension    Vitamin D deficiency    Microcytic anemia    GERD (gastroesophageal reflux disease)    Postnasal drip    Pseudomonas aeruginosa colonization    Allergic rhinitis    Recurrent pneumonia    Type 2 diabetes mellitus, with long-term current use of insulin    History of cholecystectomy        FAMILY HISTORY:  Family history of diabetes mellitus  father    Family history of allergic rhinitis (Child, Child)  son, daughter    Family history of bronchitis  parents        SOCIAL HISTORY:  Smoking: [ ] Never Smoked [ ] Former Smoker (__ packs x ___ years) [ ] Current Smoker  (__ packs x ___ years)  Substance Use: [ ] Never Used [ ] Used ____  EtOH Use:  Marital Status: [ ] Single [ ]  [ ]  [ ]   Sexual History:   Occupation:  Recent Travel:  Country of Birth:  Advance Directives:    Allergies    No Known Allergies    Intolerances        HOME MEDICATIONS:  Home Medications:  Admelog SoloStar 100 units/mL injectable solution: 2 unit(s) injectable 3 times a day (before meals) (01 Apr 2021 03:24)  albuterol 2.5 mg/3 mL (0.083%) inhalation solution: 3 milliliter(s) inhaled every 6 hours, As Needed (01 Apr 2021 03:24)  albuterol 90 mcg/inh inhalation aerosol: 1 to 2 puff(s) inhaled every 4 to 6 hours, As Needed (01 Apr 2021 03:24)  amLODIPine 5 mg oral tablet: 1 tab(s) orally once a day (01 Apr 2021 03:24)  Basaglar KwikPen 100 units/mL subcutaneous solution: 10 unit(s) subcutaneous once a day (at bedtime) (01 Apr 2021 03:24)  bisacodyl 5 mg oral delayed release tablet: 1 tab(s) orally 2 times a day, As Needed (01 Apr 2021 03:24)  budesonide 0.5 mg/2 mL inhalation suspension: 2 milliliter(s) inhaled every 12 hours (01 Apr 2021 03:24)  Calcium 600+D oral tablet: 1 tab(s) orally once a day (01 Apr 2021 03:24)  ferrous gluconate 324 mg (37.5 mg elemental iron) oral tablet: 1 tab(s) orally 2 times a day (01 Apr 2021 03:24)  Flonase 50 mcg/inh nasal spray: 1 spray(s) in each nostril once a day (01 Apr 2021 03:24)  metFORMIN 1000 mg oral tablet: 1 tab(s) orally 2 times a day (01 Apr 2021 02:05)  montelukast 10 mg oral tablet: 1 tab(s) orally once a day (01 Apr 2021 03:24)  NebuSal: 4 milliliter(s) inhaled 2 times a day (01 Apr 2021 03:24)  omeprazole 20 mg oral delayed release capsule: 1 cap(s) orally once a day (01 Apr 2021 03:24)  Perforomist 20 mcg/2 mL inhalation solution: 2 milliliter(s) inhaled every 12 hours (01 Apr 2021 03:24)  predniSONE 20 mg oral tablet: 1 tab(s) orally 2 times a day (01 Apr 2021 03:24)  Senna 8.6 mg oral tablet: 1 tab(s) orally 2 times a day (01 Apr 2021 03:24)  Vitamin C 500 mg oral tablet: 1 tab(s) orally once a day (01 Apr 2021 03:24)  Vitamin D2 50,000 intl units (1.25 mg) oral capsule: 1 cap(s) orally once a week (01 Apr 2021 03:24)      REVIEW OF SYSTEMS:  Constitutional: [x] negative [ ] fevers [ ] chills [ ] weight loss [ ] weight gain  HEENT: [x] negative [ ] dry eyes [ ] eye irritation [ ] postnasal drip [ ] nasal congestion  CV: [x] negative  [ ] chest pain [ ] orthopnea [ ] palpitations [ ] murmur  Resp: [ ] negative [x] cough [x] shortness of breath [ ] dyspnea [ ] wheezing [ ] sputum [ ] hemoptysis  GI: [x] negative [ ] nausea [ ] vomiting [ ] diarrhea [ ] constipation [ ] abd pain [ ] dysphagia   : [x] negative [ ] dysuria [ ] nocturia [ ] hematuria [ ] increased urinary frequency  Musculoskeletal: [ ] negative [ ] back pain [ ] myalgias [ ] arthralgias [ ] fracture  Skin: [ ] negative [ ] rash [ ] itch  Neurological: [ ] negative [ ] headache [ ] dizziness [ ] syncope [ ] weakness [ ] numbness  Psychiatric: [ ] negative [ ] anxiety [ ] depression  Endocrine: [ ] negative [ ] diabetes [ ] thyroid problem  Hematologic/Lymphatic: [ ] negative [ ] anemia [ ] bleeding problem  Allergic/Immunologic: [ ] negative [ ] itchy eyes [ ] nasal discharge [ ] hives [ ] angioedema  [ ] All other systems negative  [ ] Unable to assess ROS because ________    OBJECTIVE:  ICU Vital Signs Last 24 Hrs  T(C): 36.7 (01 Apr 2021 07:17), Max: 36.8 (01 Apr 2021 04:39)  T(F): 98 (01 Apr 2021 07:17), Max: 98.2 (01 Apr 2021 04:39)  HR: 78 (01 Apr 2021 07:17) (74 - 98)  BP: 136/81 (01 Apr 2021 07:17) (128/71 - 145/77)  BP(mean): --  ABP: --  ABP(mean): --  RR: 17 (01 Apr 2021 07:17) (17 - 24)  SpO2: 97% (01 Apr 2021 07:17) (97% - 100%)        CAPILLARY BLOOD GLUCOSE      POCT Blood Glucose.: 196 mg/dL (01 Apr 2021 04:08)      PHYSICAL EXAM:  GEN: Elderly female, NAD  HEENT: EOMI  CV: RR  PULM: No increased WOB, intermittent crackles  ABD: Soft  EXT: P    HOSPITAL MEDICATIONS:  Standing Meds:  buDESOnide    Inhalation Suspension 0.5 milliGRAM(s) Inhalation every 12 hours  dextrose 40% Gel 15 Gram(s) Oral once  dextrose 5%. 1000 milliLiter(s) IV Continuous <Continuous>  dextrose 5%. 1000 milliLiter(s) IV Continuous <Continuous>  dextrose 50% Injectable 25 Gram(s) IV Push once  dextrose 50% Injectable 12.5 Gram(s) IV Push once  dextrose 50% Injectable 25 Gram(s) IV Push once  enoxaparin Injectable 40 milliGRAM(s) SubCutaneous daily  glucagon  Injectable 1 milliGRAM(s) IntraMuscular once  influenza   Vaccine 0.5 milliLiter(s) IntraMuscular once  insulin glargine Injectable (LANTUS) 8 Unit(s) SubCutaneous at bedtime  insulin lispro (ADMELOG) corrective regimen sliding scale   SubCutaneous three times a day before meals  insulin lispro (ADMELOG) corrective regimen sliding scale   SubCutaneous at bedtime  montelukast 10 milliGRAM(s) Oral daily  pantoprazole    Tablet 40 milliGRAM(s) Oral before breakfast  piperacillin/tazobactam IVPB.. 3.375 Gram(s) IV Intermittent every 8 hours  polyethylene glycol 3350 17 Gram(s) Oral daily  predniSONE   Tablet 20 milliGRAM(s) Oral two times a day  senna 2 Tablet(s) Oral at bedtime  sodium chloride 3%  Inhalation 4 milliLiter(s) Inhalation two times a day  venlafaxine XR. 37.5 milliGRAM(s) Oral daily      PRN Meds:  ALBUTerol    0.083% 2.5 milliGRAM(s) Nebulizer every 6 hours PRN      LABS:                        13.2   9.03  )-----------( 207      ( 01 Apr 2021 08:07 )             42.1     Hgb Trend: 13.2<--, 11.7<--  03-31    136  |  98  |  22  ----------------------------<  286<H>  4.9   |  30  |  0.51    Ca    9.8      31 Mar 2021 19:32    TPro  6.9  /  Alb  3.3  /  TBili  <0.2  /  DBili  x   /  AST  27  /  ALT  41<H>  /  AlkPhos  189<H>  03-31    Creatinine Trend: 0.51<--        Venous Blood Gas:  03-31 @ 19:32  7.37/52/70/28/86.0  VBG Lactate: 1.1      MICROBIOLOGY:       RADIOLOGY:  [ ] Reviewed and interpreted by me    PULMONARY FUNCTION TESTS:    EKG:

## 2024-02-02 NOTE — DISCHARGE NOTE NURSING/CASE MANAGEMENT/SOCIAL WORK - PATIENT PORTAL LINK FT
You can access the FollowMyHealth Patient Portal offered by [facility] by registering at the following website: [email or web address]. By joining [facility]’s FollowMyHealth portal, you will also be able to view your health information using other applications (apps) compatible with our system.
Vaccine status unknown

## 2024-03-15 NOTE — PROGRESS NOTE ADULT - ATTENDING COMMENTS
Attending to bill Attending to bill Attending to bill Attending to bill Chronic bronchiectasis with likely exacerbation.  Still with cough productive of sputum.  Empiric antibiotics with Bactrim. F/U sputum culture.  Increased steroids, continue at present dose.  Aggressive chest PT and NEBs.

## 2024-03-28 NOTE — H&P ADULT - PROBLEM/PLAN-2
-- DO NOT REPLY / DO NOT REPLY ALL --  -- Message is from Engagement Center Operations (ECO) --    Referral Request  Name of Specialist: Open ended referral   Provider's specialty: Peds Gastroenterology    Medical condition for referral:   Stomach pain & diarrhea     Orlin (patient's mom) stated she was told a couple of months ago by the clinical team someone would contact her back once the referral was done. She also stated that the patient was placed on a waitlist but I dont see that he has. Mom is requesting an open ended referral so she can be able to schedule with any [facility]    Is this a NEW request?: yes      Referral ordered by: Zoe Marrero       Insurance type: see below       Payor: [facility] MEDICAID / Plan: [facility] MEDICAID HMO / Product Type: T19 HMO      Preferred Delivery Method   Input in Epic Please call mom once the referral has been entered so she can schedule     Caller Information         Type Contact Phone/Fax    03/28/2024 10:44 AM CDT Phone (Incoming) GUYORLIN (Mother) [phone]            Alternative phone number: no     Can a detailed message be left? Yes       DISPLAY PLAN FREE TEXT

## 2024-04-15 NOTE — PROGRESS NOTE ADULT - PROBLEM SELECTOR PLAN 4
No protocol for requested medication.  Order pended, routed to clinician for review.     Refill Requested:     Disp Refills Start End    ziprasidone (GEODON) 60 MG capsule 60 capsule 1 1/15/2024 --    Sig - Route: Take 1 capsule by mouth in the morning and 1 capsule in the evening. Take with meals. - Oral       Last visit: 3-12-24  Recommended Follow Up: 2 months  No Show/Cancel: none  Next visit: 5/13/2024       - Home has 7U admelog TID and 8U lantus  - on 9 units premeal TID, ISS, 10 units of lantus qhs due to starting prednisone - Home has 7U admelog TID and 8U lantus  - on 9 units premeal TID, ISS, 8 units of lantus qhs

## 2024-05-22 NOTE — PROGRESS NOTE ADULT - RESPIRATORY
detailed exam
(1) Other Diagnosis
detailed exam
detailed exam

## 2024-09-10 NOTE — ED ADULT NURSE NOTE - NS PRO AD NO ADVANCE DIRECTIVE
Transitional Care Management Visit       Discharge Date: 9/8/24 at [facility]    Tamiko Rushing is a 77 year old here for Office Visit (Body itch /)     The discharge summary was reviewed. It documents that the patient was hospitalized for chest pain.  Pt was admitted for obs at [facility]. Review of chart on admission pt had non radiating left sided chest pain with dizziness and light headedness.   Was admitted and had a cardiac workup. Lexiscan negative. Hospitalist felt pain was musculoskeletal and lidocaine patches were ordered.  Chest pain has not recurred since then.  Pt is still complaining.  MRI hip scheduled end of the month.  Not able to get in to neuropsych.  Pt states when medication was stopped in the hospital he felt better, \"like a normal person.\" Review of chart shows he was kept on most of his meds inpatient.    Medication changes include: no changes.  Pertinent hospital labs and tests: were reviewed.  Durable Medical Equipment/Assistive devices ordered: None     Review of Systems   Musculoskeletal:  Positive for arthralgias.     As documented above.    Advance care planning documents on file - yes    Objective   Vitals:    09/10/24 1009   BP: 103/59   Pulse: 81   Resp: 16   Temp: 97.8 °F (36.6 °C)   TempSrc: Temporal   Weight: 46.6 kg (102 lb 10 oz)   Height: 5' 7\" (1.702 m)     Physical Exam  Vitals and nursing note reviewed.   Constitutional:       Appearance: Normal appearance.      Comments: Appears thin, chronically ill   Cardiovascular:      Rate and Rhythm: Normal rate and regular rhythm.      Pulses: Normal pulses.      Heart sounds: Normal heart sounds. No murmur heard.  Pulmonary:      Effort: Pulmonary effort is normal. No respiratory distress.      Breath sounds: Normal breath sounds. No wheezing.   Neurological:      General: No focal deficit present.      Mental Status: He is alert and oriented to person, place, and time.      Motor: Weakness present.      Gait: Gait  abnormal.      Comments: Ambulates with walker                  ASSESSMENT AND PLAN        1. Hospital discharge follow-up  2. Age-related osteoporosis with current pathological fracture with routine healing, subsequent encounter  -     SERVICE TO RHEUMATOLOGY IMMUNOLOGY  Follow up with Dr. Loya to switch alendronate to Prolia  3. Other schizophrenia  (CMD)  - stable, on duloxetine 60mg 1 tab daily, hydroxyzine  4. Severe protein-calorie malnutrition  (CMD)/Adult BMI <19 kg/sq m  Weight [facility] started about 2 years ago [facility] the last 1 year. Pt had multiple imaging, negative for malignancy. Was found to have gastric ulcer but did not improve his appetite or weight. Weight loss is attributed to depression vs memory impairment  6. Chronic indwelling Dunaway catheter -dunaway changed by urology monthly.  7. Chronic left hip pain - pending MRI left hip, pain meds managed by pcp at the [place]  8. Cognitive impairment - provided pt with new list of Neuropsych's he can see. Evaluate for dementia as he has an abnormal ebrain check at recent [facility] visit.  9. Need for influenza vaccination  -     INFLUENZA (FLUZONE) HIGH DOSE    Discharge medication were reviewed and updated with patient/family: fully compliant with the medication regimen prescribed at the time of discharge     Adherence to discharge treatment plan was assessed: fully adherent with the entire discharge treatment plan.    Follow Up  Return in 4 weeks (on 10/8/2024).       Future Appointments          SEP 13    2024   10:00 AM - Office Visit  [facility] - Dariel Jonas MD          SEP 23    2024   11:15 AM - Office Visit  [facility] [address] [place] [address] - Segundo Fry MD           SEP 27    2024   12:30 PM - MRI HIP LEFT WO CONTRAST  [facility] OUTPT IMAGING MRI - [facility] MRI 1 (3T VERIO)                     No

## 2024-11-05 NOTE — PHARMACOTHERAPY INTERVENTION NOTE - NSPHARMCOMMPTEDU
Patient Education - Diabetes
My signature below certifies that the above stated patient is homebound and upon completion of the Face-To-Face encounter, has the need for intermittent skilled nursing, physical therapy and/or speech or occupational therapy services in their home for their current diagnosis as outlined in their initial plan of care. These services will continue to be monitored by myself or another physician.

## 2024-11-22 NOTE — ED PROVIDER NOTE - TOBACCO USE
PAST SURGICAL HISTORY:  History of cholecystectomy     History of elbow surgery     History of hand surgery     
Unknown if ever smoked

## 2024-12-03 NOTE — DIETITIAN INITIAL EVALUATION ADULT. - ADD RECOMMEND
Prior Authorization Form:      DEMOGRAPHICS:                     Patient Name:  Colleen Qiu  Patient :  1940            Insurance:  Payor: [facility] MEDICARE / Plan: [facility] MEDICARE ADVANTAGE / Product Type: *No Product type* /   Insurance ID Number:    Payer/Plan Subscr  Sex Relation Sub. Ins. ID Effective Group Num   1. [facility] MEDICARE * DEBRA QIU* 1940 Female Self [id number]                                   [address] [address]         DIAGNOSIS & PROCEDURE:                       Procedure/Operation: Laparoscopic robotic XI assisted cholecystectomy           CPT Code: 11129    Diagnosis:  Symptomatic cholelithiasis    ICD10 Code: K80.20    Location:  [facility]    Surgeon:  Dr Chambers    SCHEDULING INFORMATION:                          Date: 25    Time: 11:00 am              Anesthesia:  General                                                       Status:  Outpatient        Special Comments:         Electronically signed by Mitali Julian MA on 12/3/2024 at 6:35 AM     1. Encourage & assist Pt with meals; Monitor PO diet tolerance; Honor food preferences;       2. Monitor labs, hydration status;           3. Will recommend to follow up with appropriate RDN upon discharge for the purposes of long-term nutrition evaluation and diet education;

## 2024-12-23 NOTE — PATIENT PROFILE ADULT - FUNCTIONAL ASSESSMENT - DAILY ACTIVITY 6.
Copied from CRM #[id number]. Topic:  Schedule Appointment -  Schedule Primary Care  >> Dec 23, 2024 11:17 AM Yoshi LAURA wrote:  Ayana called requesting to schedule a primary care visit with a Clinician. Checked insurance.  Looked for any active requests, recalls, service to orders, scheduling tickets prior to scheduling. The decision tree DT PC Was used for scheduling (an)     Non-Acute need. Not scheduled and followed instructions to message or transfer. Sent a message. Selected 'Wrap up CRM' and created new Telephone Encounter after clicking 'Convert to Clinical Call'. Selected reason for call 'Appointment'. Sent 'Appointment' template and routed as routine priority per Clinician KB page to appropriate clinician pool.    -- DO NOT REPLY / DO NOT REPLY ALL --  -- This inbox is not monitored. If this was sent to the wrong provider or department, reroute message to P ECO Reroute pool. --  -- Message is from Engagement Center Operations (ECO) --  Reason for Appointment Message: Caller wants sooner ER follow up appointment - all locations with clinician were offered    Reason for Visit: ED Follow-Up/ 12/23/2024/[place]/ need to see a specialist for her hand (nunbness in her hand)     Is the patient currently scheduled? No    Preferred time to be seen: Morning but will take whatever she can get    Caller Information       Contact Date/Time Type Contact Phone/Fax    12/23/2024 11:13 AM CST Phone (Incoming) Ayana [phone]            Alternative phone number: none    Can a detailed message be left?  Yes - Voicemail   Patient has been advised the message will be addressed within 2-3 business days            3 = A little assistance

## 2025-03-21 NOTE — ED PROVIDER NOTE - IV ALTEPLASE EXCL ABS HIDDEN
Instructions for Day Surgery & Early Admit Cases     Your hospital arrival time will be given the afternoon of the business day prior to your surgical date,  via text message or phone call. Once you accept the text, we will know you received your arrival time. If your surgery falls the day after a holiday or on a Monday, you will be notified the prior business day. We will directly contact you for any changes to the surgical schedule that would affect your arrival time.    When you arrive to the hospital campus, follow the signs to the Main Entrance, and then follow signs for Parking A, which is located to the right of the Main Entrance if you are facing the building. [employer] parking is available Monday thru Friday 8am- 4pm. Enter through the main entrance on the ground level. Proceed to the East elevators, which are located past the front lobby desk on the right. The [facility] is located up one level on the first floor. Exit the elevator and the reception desk is to your right.        Masking is optional for all patients, visitors, and teammates with some exceptions. Those who wish to continue to mask may do so.      What to Bring    Photo ID and Insurance Card  Do not wear jewelry this also applies to permanent jewelry, if not removed could result in a burn, your surgery may be canceled if not removed. Do not wear makeup, including mascara.   Please remove any nail polish.   If you use an inhaler for asthma, please bring it in to the hospital (must not be ).   Come in comfortable clothes. Wear sturdy shoes, please no flip-flops or clogs.   Glasses, dentures, hearing aids and their cases.  Do not bring any money/valuables with you to the hospital. Your family/friends will be responsible for any items brought to the hospital.   List of your current medication.  In an effort to avoid your surgery from being cancelled, it is very important to stop your GLP1/weight loss medication 7 days before your  surgery date (not counting the day of surgery) because these medications slow down your digestion. This is being done for your safety and to avoid serious complications.  During anesthesia, a complication called aspiration could occur which is when the contents of your stomach to back up into your lungs, which could lead to pneumonia.     IF you are staying overnight also bring:  CPAP- if you use one  Overnight bag with things you would need while in the hospital (clothes, phone , and toiletries). Hairbrush or hair tie if needed (other toiletries can be provided if desired).          If you have any questions regarding the instructions above, please call [phone] by 8:30 pm     Limited Visitor Instructions    Two visitors allowed at a time for each patient.    One substitute decision maker (corporate guardian, health care agent) in addition to visitors for non-decisional patients  Unlimited aris leader visitors - in addition to the two visitors allowed for each patient.  ICUs: Two visitors at a time for each patient.    End of Life: Unlimited number of visitors allowed at one time in patient's room based on space availability and/or limitations to the teammate's ability to deliver safe care.     Visitors of patients in transmission-based isolation (including COVID) are to follow proper PPE precautions. Clinical care team has discretion to limit visitors for a patient if a visitor presents a health or safety risk to the patient, to themselves, or the health care team; or otherwise creates a public health or safety problem. Visitors must comply with applicable health screening requirement. Visitors must follow current masking guidelines.       You have been scheduled by your Doctor’s office for a Coronary angiogram,     FAILURE TO COMPLY WITH THE FOLLOWING WILL RESULT IN EITHER DELAY OR CANCELLATION OF YOUR PROCEDURE DUE TO THE RISK OF ASPIRATION.  A full meal is allowed up to 8 hours prior to ARRIVAL time  if allowed by your surgeon. Nothing to eat after this. You may have water 2 hours prior to your arrival time.     If you are on Coumadin, Xarelto, Pradaxa, Eliquis, or Savaysa. Please follow instructions given by your physician.     If you have IODINE/CONTRAST/DYE allergy tell your physician about it.     Take 325 mg of aspirin  or 4 tablets of 81mg aspirin the morning of your procedure as ordered by MD.     Hold diabetic and diuretic (Lasix, Bumex) medications morning of procedure.     LENGTH OF PROCEDURE - The procedure is approximately 1.5-3 hours (give or take). You will be given conscious sedation also known as twilight.    Expect to be in the hospital anywhere from 4-6 hours after your procedure.    Be prepared to stay overnight and bring basic necessities with you.    If you are going home the same day of surgery, you must have a ride arranged to get home and have a responsible adult stay with you for 24 hours. You cannot go by public transportation by yourself.    Please shower before surgery, no lotions, deodorant, cologne, perfumes or other skin products. Do not wear jewelry, this also applies to permanent jewelry. If it is not removed it could result in a burn, or the cancellation of your surgery. Do not wear makeup, including mascara.    Please remove any nail polish including toes.    Come in loose, clean comfortable clothes. Wear sturdy shoes, please no flip-flops or clogs.    Please brush and floss the night before and morning of your procedure with a new toothbrush.    Call your surgeon immediately if you feel that you cannot make it for surgery for any reason (i.e. cold symptoms, fever, family emergency etc.)    Please remember to have your pre procedural testing completed prior to your procedure. Your testing needs to be completed no later than 3/24 (BMP, CBC). The lab at [facility] is open Monday- Friday 6:30am -6pm and Saturday 7 am- 1:30 pm.    show

## 2025-04-28 NOTE — PATIENT PROFILE ADULT - VISION (WITH CORRECTIVE LENSES IF THE PATIENT USUALLY WEARS THEM):
Normal vision: sees adequately in most situations; can see medication labels, newsprint
supervision/verbal cues/nonverbal cues (demo/gestures)

## 2025-05-08 NOTE — PATIENT PROFILE ADULT - HARM RISK FACTORS
Problem: PAIN - ADULT  Goal: Verbalizes/displays adequate comfort level or baseline comfort level  Description: Interventions:- Encourage patient to monitor pain and request assistance- Assess pain using appropriate pain scale- Administer analgesics based on type and severity of pain and evaluate response- Implement non-pharmacological measures as appropriate and evaluate response- Consider cultural and social influences on pain and pain management- Notify physician/advanced practitioner if interventions unsuccessful or patient reports new pain  Interventions:- Encourage patient to monitor pain and request assistance- Assess pain using appropriate pain scale- Administer analgesics based on type and severity of pain and evaluate response- Implement non-pharmacological measures as appropriate and evaluate response- Consider cultural and social influences on pain and pain management- Notify physician/advanced practitioner if interventions unsuccessful or patient reports new pain  Outcome: Progressing     Problem: SAFETY ADULT  Goal: Patient will remain free of falls  Description: INTERVENTIONS:- Educate patient/family on patient safety including physical limitations- Instruct patient to call for assistance with activity - Consult OT/PT to assist with strengthening/mobility - Keep Call bell within reach- Keep bed low and locked with side rails adjusted as appropriate- Keep care items and personal belongings within reach- Initiate and maintain comfort rounds- Make Fall Risk Sign visible to staff- Offer Toileting every  Hours, in advance of need- Initiate/Maintain alarm- Obtain necessary fall risk management equipment: - Apply yellow socks and bracelet for high fall risk patients- Consider moving patient to room near nurses station  INTERVENTIONS:- Educate patient/family on patient safety including physical limitations- Instruct patient to call for assistance with activity - Consult OT/PT to assist with  strengthening/mobility - Keep Call bell within reach- Keep bed low and locked with side rails adjusted as appropriate- Keep care items and personal belongings within reach- Initiate and maintain comfort rounds- Make Fall Risk Sign visible to staff- Offer Toileting every  Hours, in advance of need- Initiate/Maintain alarm- Obtain necessary fall risk management equipment: - Apply yellow socks and bracelet for high fall risk patients- Consider moving patient to room near nurses station  Outcome: Progressing  Goal: Maintain or return to baseline ADL function  Description: INTERVENTIONS:-  Assess patient's ability to carry out ADLs; assess patient's baseline for ADL function and identify physical deficits which impact ability to perform ADLs (bathing, care of mouth/teeth, toileting, grooming, dressing, etc.)- Assess/evaluate cause of self-care deficits - Assess range of motion- Assess patient's mobility; develop plan if impaired- Assess patient's need for assistive devices and provide as appropriate- Encourage maximum independence but intervene and supervise when necessary- Involve family in performance of ADLs- Assess for home care needs following discharge - Consider OT consult to assist with ADL evaluation and planning for discharge- Provide patient education as appropriate  INTERVENTIONS:-  Assess patient's ability to carry out ADLs; assess patient's baseline for ADL function and identify physical deficits which impact ability to perform ADLs (bathing, care of mouth/teeth, toileting, grooming, dressing, etc.)- Assess/evaluate cause of self-care deficits - Assess range of motion- Assess patient's mobility; develop plan if impaired- Assess patient's need for assistive devices and provide as appropriate- Encourage maximum independence but intervene and supervise when necessary- Involve family in performance of ADLs- Assess for home care needs following discharge - Consider OT consult to assist with ADL evaluation and  planning for discharge- Provide patient education as appropriate  Outcome: Progressing  Goal: Maintains/Returns to pre admission functional level  Description: INTERVENTIONS:- Perform AM-PAC 6 Click Basic Mobility/ Daily Activity assessment daily.- Set and communicate daily mobility goal to care team and patient/family/caregiver. - Collaborate with rehabilitation services on mobility goals if consulted- Perform Range of Motion  times a day.- Reposition patient every  hours.- Dangle patient  times a day- Stand patient  times a day- Ambulate patient  times a day- Out of bed to chair  times a day - Out of bed for meals  times a day- Out of bed for toileting- Record patient progress and toleration of activity level   INTERVENTIONS:- Perform AM-PAC 6 Click Basic Mobility/ Daily Activity assessment daily.- Set and communicate daily mobility goal to care team and patient/family/caregiver. - Collaborate with rehabilitation services on mobility goals if consulted- Perform Range of Motion  times a day.- Reposition patient every  hours.- Dangle patient  times a day- Stand patient  times a day- Ambulate patient  times a day- Out of bed to chair  times a day - Out of bed for meals  times a day- Out of bed for toileting- Record patient progress and toleration of activity level   Outcome: Progressing     Problem: DISCHARGE PLANNING  Goal: Discharge to home or other facility with appropriate resources  Description: INTERVENTIONS:- Identify barriers to discharge w/patient and caregiver- Arrange for needed discharge resources and transportation as appropriate- Identify discharge learning needs (meds, wound care, etc.)- Arrange for interpretive services to assist at discharge as needed- Refer to Case Management Department for coordinating discharge planning if the patient needs post-hospital services based on physician/advanced practitioner order or complex needs related to functional status, cognitive ability, or social support  system  INTERVENTIONS:- Identify barriers to discharge w/patient and caregiver- Arrange for needed discharge resources and transportation as appropriate- Identify discharge learning needs (meds, wound care, etc.)- Arrange for interpretive services to assist at discharge as needed- Refer to Case Management Department for coordinating discharge planning if the patient needs post-hospital services based on physician/advanced practitioner order or complex needs related to functional status, cognitive ability, or social support system  Outcome: Progressing     Problem: INFECTION - ADULT  Goal: Absence or prevention of progression during hospitalization  Description: INTERVENTIONS:- Assess and monitor for signs and symptoms of infection- Monitor lab/diagnostic results- Monitor all insertion sites, i.e. indwelling lines, tubes, and drains- Monitor endotracheal if appropriate and nasal secretions for changes in amount and color- Gray appropriate cooling/warming therapies per order- Administer medications as ordered- Instruct and encourage patient and family to use good hand hygiene technique- Identify and instruct in appropriate isolation precautions for identified infection/condition  Outcome: Progressing     Problem: Knowledge Deficit  Goal: Patient/family/caregiver demonstrates understanding of disease process, treatment plan, medications, and discharge instructions  Description: Complete learning assessment and assess knowledge base.Interventions:- Provide teaching at level of understanding- Provide teaching via preferred learning methods  Outcome: Progressing      no

## 2025-06-13 NOTE — END OF VISIT
[FreeTextEntry1] : follow up  insomnia- takes xanax for sleep switched to temazepam has been sleeping better  migraines qulipta helps  needs renewal  
[Time Spent: ___ minutes] : I have spent [unfilled] minutes of time on the encounter.